# Patient Record
Sex: MALE | Race: WHITE
[De-identification: names, ages, dates, MRNs, and addresses within clinical notes are randomized per-mention and may not be internally consistent; named-entity substitution may affect disease eponyms.]

---

## 2018-02-26 ENCOUNTER — HOSPITAL ENCOUNTER (OUTPATIENT)
Age: 78
End: 2018-02-26
Payer: MEDICARE

## 2018-02-26 DIAGNOSIS — M10.9: ICD-10-CM

## 2018-02-26 DIAGNOSIS — N18.3: Primary | ICD-10-CM

## 2018-02-26 DIAGNOSIS — D63.1: ICD-10-CM

## 2018-02-26 DIAGNOSIS — N25.81: ICD-10-CM

## 2018-02-26 DIAGNOSIS — E83.42: ICD-10-CM

## 2018-02-26 LAB
ALBUMIN SERPL-MCNC: 3.4 G/DL (ref 3.2–5)
BUN SERPL-MCNC: 19 MG/DL (ref 7–18)
BUN/CREAT RATIO: 16.2 RATIO (ref 10–20)
CALCIUM SERPL-MCNC: 9.6 MG/DL (ref 8.5–10.1)
CARBON DIOXIDE: 29 MMOL/L (ref 21–32)
CHLORIDE: 109 MMOL/L (ref 98–107)
CREAT UR-MCNC: 103 MG/DL
DEPRECATED RDW RBC: 60.4 FL (ref 35.1–43.9)
DIFFERENTIAL INDICATED: (no result)
ERYTHROCYTE [DISTWIDTH] IN BLOOD: 18.2 % (ref 11.6–14.6)
EST GLOM FILT RATE - AFR AMER: 78 ML/MIN (ref 60–?)
GLUCOSE: 131 MG/DL (ref 74–106)
HCT VFR BLD AUTO: 35.6 % (ref 40–54)
HEMOGLOBIN: 11 G/DL (ref 13–16.5)
HGB BLD-MCNC: 11 G/DL (ref 13–16.5)
IMMATURE GRANULOCYTES COUNT: 0.01 X10^3/UL (ref 0–0)
MAGNESIUM: 1.6 MG/DL (ref 1.6–2.6)
MCV RBC: 93.7 FL (ref 80–94)
MEAN CORP HGB CONC: 30.9 G/GL (ref 32–36)
MEAN PLATELET VOL.: 11.7 FL (ref 6.2–12)
PLATELET # BLD: 111 K/MM3 (ref 150–450)
PLATELET COUNT: 111 K/MM3 (ref 150–450)
POSITIVE COUNT: NO
POSITIVE DIFFERENTIAL: NO
POSITIVE MORPHOLOGY: NO
POTASSIUM: 4.2 MMOL/L (ref 3.5–5.1)
PROT UR-MCNC: 15.5 MG/DL (ref ?–11.9)
PROT/CREAT UR: 150 MG/G CRE (ref 0–200)
RBC # BLD AUTO: 3.8 M/MM3 (ref 4.6–6.2)
RBC DISTRIBUTION WIDTH CV: 18.2 % (ref 11.6–14.6)
RBC DISTRIBUTION WIDTH SD: 60.4 FL (ref 35.1–43.9)
URATE SERPL-MCNC: 5.4 MG/DL (ref 3.5–7.2)
VITAMIN D,25 HYDROXY: 32.4 NG/ML (ref 19.95–100.01)
WBC # BLD AUTO: 5.2 K/MM3 (ref 4.4–11)
WHITE BLOOD COUNT: 5.2 K/MM3 (ref 4.4–11)

## 2018-02-26 PROCEDURE — 84156 ASSAY OF PROTEIN URINE: CPT

## 2018-02-26 PROCEDURE — 84550 ASSAY OF BLOOD/URIC ACID: CPT

## 2018-02-26 PROCEDURE — 82306 VITAMIN D 25 HYDROXY: CPT

## 2018-02-26 PROCEDURE — 80069 RENAL FUNCTION PANEL: CPT

## 2018-02-26 PROCEDURE — 36415 COLL VENOUS BLD VENIPUNCTURE: CPT

## 2018-02-26 PROCEDURE — 83735 ASSAY OF MAGNESIUM: CPT

## 2018-02-26 PROCEDURE — 83970 ASSAY OF PARATHORMONE: CPT

## 2018-02-26 PROCEDURE — 85025 COMPLETE CBC W/AUTO DIFF WBC: CPT

## 2018-02-26 PROCEDURE — 82570 ASSAY OF URINE CREATININE: CPT

## 2018-02-28 LAB — PTHIN: 68.9 PG/ML (ref 18.4–80.1)

## 2018-11-05 ENCOUNTER — HOSPITAL ENCOUNTER (EMERGENCY)
Age: 78
LOS: 1 days | Discharge: HOME | End: 2018-11-06
Payer: MEDICARE

## 2018-11-05 VITALS — BODY MASS INDEX: 40.4 KG/M2

## 2018-11-05 VITALS — RESPIRATION RATE: 16 BRPM | HEART RATE: 98 BPM | TEMPERATURE: 99.68 F | OXYGEN SATURATION: 95 %

## 2018-11-05 VITALS — HEART RATE: 99 BPM | SYSTOLIC BLOOD PRESSURE: 160 MMHG | RESPIRATION RATE: 13 BRPM | DIASTOLIC BLOOD PRESSURE: 92 MMHG

## 2018-11-05 VITALS
HEART RATE: 90 BPM | SYSTOLIC BLOOD PRESSURE: 125 MMHG | DIASTOLIC BLOOD PRESSURE: 77 MMHG | RESPIRATION RATE: 20 BRPM | OXYGEN SATURATION: 97 %

## 2018-11-05 DIAGNOSIS — Z79.02: ICD-10-CM

## 2018-11-05 DIAGNOSIS — Z79.51: ICD-10-CM

## 2018-11-05 DIAGNOSIS — J45.909: ICD-10-CM

## 2018-11-05 DIAGNOSIS — Q61.3: ICD-10-CM

## 2018-11-05 DIAGNOSIS — R11.0: Primary | ICD-10-CM

## 2018-11-05 DIAGNOSIS — I12.9: ICD-10-CM

## 2018-11-05 DIAGNOSIS — N18.9: ICD-10-CM

## 2018-11-05 DIAGNOSIS — Z95.2: ICD-10-CM

## 2018-11-05 DIAGNOSIS — Z79.899: ICD-10-CM

## 2018-11-05 DIAGNOSIS — M10.9: ICD-10-CM

## 2018-11-05 LAB
ANION GAP: 7 (ref 5–15)
BUN SERPL-MCNC: 22 MG/DL (ref 7–18)
BUN/CREAT RATIO: 15.7 RATIO (ref 10–20)
CALCIUM SERPL-MCNC: 10.4 MG/DL (ref 8.5–10.1)
CARBON DIOXIDE: 25 MMOL/L (ref 21–32)
CHLORIDE: 109 MMOL/L (ref 98–107)
DEPRECATED RDW RBC: 58.8 FL (ref 35.1–43.9)
DIFFERENTIAL INDICATED: (no result)
ERYTHROCYTE [DISTWIDTH] IN BLOOD: 16.2 % (ref 11.6–14.6)
EST GLOM FILT RATE - AFR AMER: 63 ML/MIN (ref 60–?)
ESTIMATED CREATININE CLEARANCE: 44.9 ML/MIN
GLUCOSE: 142 MG/DL (ref 74–106)
HCT VFR BLD AUTO: 43.8 % (ref 40–54)
HEMOGLOBIN: 13.7 G/DL (ref 13–16.5)
HGB BLD-MCNC: 13.7 G/DL (ref 13–16.5)
IMMATURE GRANULOCYTES COUNT: 0.01 X10^3/UL (ref 0–0)
MCV RBC: 99.3 FL (ref 80–94)
MEAN CORP HGB CONC: 31.3 G/GL (ref 32–36)
MEAN PLATELET VOL.: 13 FL (ref 6.2–12)
PLATELET # BLD: 89 K/MM3 (ref 150–450)
PLATELET COUNT: 89 K/MM3 (ref 150–450)
POSITIVE COUNT: NO
POSITIVE DIFFERENTIAL: YES
POSITIVE MORPHOLOGY: NO
POTASSIUM: 4.3 MMOL/L (ref 3.5–5.1)
RBC # BLD AUTO: 4.41 M/MM3 (ref 4.6–6.2)
RBC DISTRIBUTION WIDTH CV: 16.2 % (ref 11.6–14.6)
RBC DISTRIBUTION WIDTH SD: 58.8 FL (ref 35.1–43.9)
SCAN SMEAR PER REVIEW CRITERIA: (no result)
WBC # BLD AUTO: 7.4 K/MM3 (ref 4.4–11)
WHITE BLOOD COUNT: 7.4 K/MM3 (ref 4.4–11)

## 2018-11-05 PROCEDURE — 80048 BASIC METABOLIC PNL TOTAL CA: CPT

## 2018-11-05 PROCEDURE — A4216 STERILE WATER/SALINE, 10 ML: HCPCS

## 2018-11-05 PROCEDURE — 85025 COMPLETE CBC W/AUTO DIFF WBC: CPT

## 2018-11-05 PROCEDURE — 84484 ASSAY OF TROPONIN QUANT: CPT

## 2018-11-05 PROCEDURE — 93005 ELECTROCARDIOGRAM TRACING: CPT

## 2018-11-05 PROCEDURE — 96361 HYDRATE IV INFUSION ADD-ON: CPT

## 2018-11-05 PROCEDURE — 71045 X-RAY EXAM CHEST 1 VIEW: CPT

## 2018-11-05 PROCEDURE — 96374 THER/PROPH/DIAG INJ IV PUSH: CPT

## 2018-11-05 PROCEDURE — 99285 EMERGENCY DEPT VISIT HI MDM: CPT

## 2019-02-22 ENCOUNTER — HOSPITAL ENCOUNTER (OUTPATIENT)
Age: 79
End: 2019-02-22
Payer: MEDICARE

## 2019-02-22 VITALS — BODY MASS INDEX: 40.4 KG/M2

## 2019-02-22 DIAGNOSIS — N18.3: Primary | ICD-10-CM

## 2019-02-22 DIAGNOSIS — E55.9: ICD-10-CM

## 2019-02-22 DIAGNOSIS — D63.1: ICD-10-CM

## 2019-02-22 LAB
ALBUMIN SERPL-MCNC: 3.7 G/DL (ref 3.2–5)
BUN SERPL-MCNC: 25 MG/DL (ref 7–18)
BUN/CREAT RATIO: 19.7 RATIO (ref 10–20)
CALCIUM SERPL-MCNC: 10.3 MG/DL (ref 8.5–10.1)
CARBON DIOXIDE: 28 MMOL/L (ref 21–32)
CHLORIDE: 108 MMOL/L (ref 98–107)
CREAT UR-MCNC: 84.8 MG/DL
DEPRECATED RDW RBC: 58.6 FL (ref 35.1–43.9)
ERYTHROCYTE [DISTWIDTH] IN BLOOD: 16.1 % (ref 11.6–14.6)
EST GLOM FILT RATE - AFR AMER: 70 ML/MIN (ref 60–?)
GLUCOSE: 108 MG/DL (ref 74–106)
HCT VFR BLD AUTO: 41.8 % (ref 40–54)
HEMOGLOBIN: 13 G/DL (ref 13–16.5)
HGB BLD-MCNC: 13 G/DL (ref 13–16.5)
MCV RBC: 99.5 FL (ref 80–94)
MEAN CORP HGB CONC: 31.1 G/GL (ref 32–36)
MEAN PLATELET VOL.: 12.8 FL (ref 6.2–12)
PLATELET # BLD: 94 K/MM3 (ref 150–450)
PLATELET COUNT: 94 K/MM3 (ref 150–450)
POTASSIUM: 4.5 MMOL/L (ref 3.5–5.1)
PROT UR-MCNC: 17.7 MG/DL (ref ?–11.9)
PROT/CREAT UR: 209 MG/G CRE (ref 0–200)
PTHIN: 95.8 PG/ML (ref 18.4–80.1)
RBC # BLD AUTO: 4.2 M/MM3 (ref 4.6–6.2)
RBC DISTRIBUTION WIDTH CV: 16.1 % (ref 11.6–14.6)
RBC DISTRIBUTION WIDTH SD: 58.6 FL (ref 35.1–43.9)
SCAN INDICATED ON CBC? Y/N: NO
VITAMIN D,25 HYDROXY: 28.9 NG/ML (ref 29.95–100.01)
WBC # BLD AUTO: 5.9 K/MM3 (ref 4.4–11)
WHITE BLOOD COUNT: 5.9 K/MM3 (ref 4.4–11)

## 2019-02-22 PROCEDURE — 84156 ASSAY OF PROTEIN URINE: CPT

## 2019-02-22 PROCEDURE — 80069 RENAL FUNCTION PANEL: CPT

## 2019-02-22 PROCEDURE — 36415 COLL VENOUS BLD VENIPUNCTURE: CPT

## 2019-02-22 PROCEDURE — 83970 ASSAY OF PARATHORMONE: CPT

## 2019-02-22 PROCEDURE — 82306 VITAMIN D 25 HYDROXY: CPT

## 2019-02-22 PROCEDURE — 82570 ASSAY OF URINE CREATININE: CPT

## 2019-02-22 PROCEDURE — 85027 COMPLETE CBC AUTOMATED: CPT

## 2019-09-03 ENCOUNTER — HOSPITAL ENCOUNTER (OUTPATIENT)
Dept: HOSPITAL 100 - LAB.FUTURE | Age: 79
Discharge: HOME | End: 2019-09-03
Payer: MEDICARE

## 2019-09-03 DIAGNOSIS — E55.9: Primary | ICD-10-CM

## 2019-09-03 DIAGNOSIS — N18.3: ICD-10-CM

## 2019-09-03 DIAGNOSIS — D63.1: ICD-10-CM

## 2019-09-03 DIAGNOSIS — N25.81: ICD-10-CM

## 2019-09-03 LAB
ALBUMIN SERPL-MCNC: 3.3 G/DL (ref 3.2–5)
BUN SERPL-MCNC: 20 MG/DL (ref 7–18)
BUN/CREAT RATIO: 14.3 RATIO (ref 10–20)
CALCIUM SERPL-MCNC: 9.8 MG/DL (ref 8.5–10.1)
CARBON DIOXIDE: 30 MMOL/L (ref 21–32)
CHLORIDE: 112 MMOL/L (ref 98–107)
CREAT UR-MCNC: 102 MG/DL
DEPRECATED RDW RBC: 61.6 FL (ref 35.1–43.9)
ERYTHROCYTE [DISTWIDTH] IN BLOOD: 17.2 % (ref 11.6–14.6)
EST GLOM FILT RATE - AFR AMER: 63 ML/MIN (ref 60–?)
GLUCOSE: 106 MG/DL (ref 74–106)
HCT VFR BLD AUTO: 37.6 % (ref 40–54)
HEMOGLOBIN: 11.6 G/DL (ref 13–16.5)
HGB BLD-MCNC: 11.6 G/DL (ref 13–16.5)
MCV RBC: 97.9 FL (ref 80–94)
MEAN CORP HGB CONC: 30.9 G/DL (ref 32–36)
MEAN PLATELET VOL.: 12.6 FL (ref 6.2–12)
PLATELET # BLD: 85 K/MM3 (ref 150–450)
PLATELET COUNT: 85 K/MM3 (ref 150–450)
POTASSIUM: 4.4 MMOL/L (ref 3.5–5.1)
PROT UR-MCNC: 11.1 MG/DL (ref ?–11.9)
PROT/CREAT UR: 109 MG/G CRE (ref 0–200)
PTHIN: 94.9 PG/ML (ref 18.4–80.1)
RBC # BLD AUTO: 3.84 M/MM3 (ref 4.6–6.2)
RBC DISTRIBUTION WIDTH CV: 17.2 % (ref 11.6–14.6)
RBC DISTRIBUTION WIDTH SD: 61.6 FL (ref 35.1–43.9)
VITAMIN D,25 HYDROXY: 29.5 NG/ML (ref 29.95–100.01)
WBC # BLD AUTO: 4.8 K/MM3 (ref 4.4–11)
WHITE BLOOD COUNT: 4.8 K/MM3 (ref 4.4–11)

## 2019-09-03 PROCEDURE — 83970 ASSAY OF PARATHORMONE: CPT

## 2019-09-03 PROCEDURE — 82306 VITAMIN D 25 HYDROXY: CPT

## 2019-09-03 PROCEDURE — 84156 ASSAY OF PROTEIN URINE: CPT

## 2019-09-03 PROCEDURE — 80069 RENAL FUNCTION PANEL: CPT

## 2019-09-03 PROCEDURE — 85027 COMPLETE CBC AUTOMATED: CPT

## 2019-09-03 PROCEDURE — 36415 COLL VENOUS BLD VENIPUNCTURE: CPT

## 2019-09-03 PROCEDURE — 82570 ASSAY OF URINE CREATININE: CPT

## 2019-12-23 ENCOUNTER — HOSPITAL ENCOUNTER (OUTPATIENT)
Dept: HOSPITAL 100 - ED | Age: 79
Setting detail: OBSERVATION
LOS: 1 days | Discharge: HOME | End: 2019-12-24
Payer: MEDICARE

## 2019-12-23 VITALS
DIASTOLIC BLOOD PRESSURE: 51 MMHG | OXYGEN SATURATION: 96 % | TEMPERATURE: 97.7 F | SYSTOLIC BLOOD PRESSURE: 94 MMHG | HEART RATE: 89 BPM | RESPIRATION RATE: 18 BRPM

## 2019-12-23 VITALS
SYSTOLIC BLOOD PRESSURE: 110 MMHG | RESPIRATION RATE: 16 BRPM | HEART RATE: 95 BPM | OXYGEN SATURATION: 94 % | DIASTOLIC BLOOD PRESSURE: 73 MMHG

## 2019-12-23 VITALS
DIASTOLIC BLOOD PRESSURE: 79 MMHG | OXYGEN SATURATION: 94 % | SYSTOLIC BLOOD PRESSURE: 100 MMHG | HEART RATE: 90 BPM | RESPIRATION RATE: 16 BRPM

## 2019-12-23 VITALS
DIASTOLIC BLOOD PRESSURE: 73 MMHG | SYSTOLIC BLOOD PRESSURE: 135 MMHG | HEART RATE: 95 BPM | RESPIRATION RATE: 18 BRPM | TEMPERATURE: 99.5 F | OXYGEN SATURATION: 94 %

## 2019-12-23 VITALS
BODY MASS INDEX: 39.4 KG/M2 | BODY MASS INDEX: 39.5 KG/M2 | TEMPERATURE: 99.14 F | RESPIRATION RATE: 18 BRPM | OXYGEN SATURATION: 96 % | HEART RATE: 86 BPM | BODY MASS INDEX: 42.3 KG/M2 | SYSTOLIC BLOOD PRESSURE: 148 MMHG | DIASTOLIC BLOOD PRESSURE: 87 MMHG | BODY MASS INDEX: 41.8 KG/M2

## 2019-12-23 VITALS
DIASTOLIC BLOOD PRESSURE: 79 MMHG | TEMPERATURE: 98.06 F | RESPIRATION RATE: 16 BRPM | OXYGEN SATURATION: 93 % | HEART RATE: 93 BPM | SYSTOLIC BLOOD PRESSURE: 148 MMHG

## 2019-12-23 VITALS
DIASTOLIC BLOOD PRESSURE: 79 MMHG | SYSTOLIC BLOOD PRESSURE: 148 MMHG | HEART RATE: 98 BPM | RESPIRATION RATE: 16 BRPM | OXYGEN SATURATION: 94 %

## 2019-12-23 VITALS
SYSTOLIC BLOOD PRESSURE: 101 MMHG | OXYGEN SATURATION: 94 % | DIASTOLIC BLOOD PRESSURE: 65 MMHG | HEART RATE: 94 BPM | RESPIRATION RATE: 16 BRPM

## 2019-12-23 VITALS
SYSTOLIC BLOOD PRESSURE: 112 MMHG | HEART RATE: 103 BPM | RESPIRATION RATE: 18 BRPM | DIASTOLIC BLOOD PRESSURE: 72 MMHG | TEMPERATURE: 99.86 F | OXYGEN SATURATION: 96 %

## 2019-12-23 VITALS
HEART RATE: 90 BPM | SYSTOLIC BLOOD PRESSURE: 99 MMHG | DIASTOLIC BLOOD PRESSURE: 66 MMHG | OXYGEN SATURATION: 93 % | RESPIRATION RATE: 16 BRPM

## 2019-12-23 VITALS
DIASTOLIC BLOOD PRESSURE: 74 MMHG | OXYGEN SATURATION: 98 % | SYSTOLIC BLOOD PRESSURE: 116 MMHG | HEART RATE: 82 BPM | RESPIRATION RATE: 18 BRPM

## 2019-12-23 VITALS
OXYGEN SATURATION: 98 % | HEART RATE: 95 BPM | SYSTOLIC BLOOD PRESSURE: 148 MMHG | DIASTOLIC BLOOD PRESSURE: 79 MMHG | RESPIRATION RATE: 16 BRPM | TEMPERATURE: 98.06 F

## 2019-12-23 VITALS
TEMPERATURE: 97.2 F | HEART RATE: 100 BPM | SYSTOLIC BLOOD PRESSURE: 88 MMHG | RESPIRATION RATE: 16 BRPM | DIASTOLIC BLOOD PRESSURE: 64 MMHG | OXYGEN SATURATION: 94 %

## 2019-12-23 VITALS — RESPIRATION RATE: 18 BRPM | HEART RATE: 109 BPM | OXYGEN SATURATION: 92 %

## 2019-12-23 DIAGNOSIS — K29.51: Primary | ICD-10-CM

## 2019-12-23 DIAGNOSIS — E66.9: ICD-10-CM

## 2019-12-23 DIAGNOSIS — I25.10: ICD-10-CM

## 2019-12-23 DIAGNOSIS — M19.90: ICD-10-CM

## 2019-12-23 DIAGNOSIS — Z79.899: ICD-10-CM

## 2019-12-23 DIAGNOSIS — Z79.51: ICD-10-CM

## 2019-12-23 DIAGNOSIS — E83.52: ICD-10-CM

## 2019-12-23 DIAGNOSIS — E78.5: ICD-10-CM

## 2019-12-23 DIAGNOSIS — D63.1: ICD-10-CM

## 2019-12-23 DIAGNOSIS — R91.8: ICD-10-CM

## 2019-12-23 DIAGNOSIS — D69.6: ICD-10-CM

## 2019-12-23 DIAGNOSIS — M10.9: ICD-10-CM

## 2019-12-23 DIAGNOSIS — G47.33: ICD-10-CM

## 2019-12-23 DIAGNOSIS — K21.9: ICD-10-CM

## 2019-12-23 DIAGNOSIS — Q61.3: ICD-10-CM

## 2019-12-23 DIAGNOSIS — J45.909: ICD-10-CM

## 2019-12-23 DIAGNOSIS — Z79.82: ICD-10-CM

## 2019-12-23 DIAGNOSIS — Z71.3: ICD-10-CM

## 2019-12-23 DIAGNOSIS — N18.3: ICD-10-CM

## 2019-12-23 DIAGNOSIS — K29.81: ICD-10-CM

## 2019-12-23 DIAGNOSIS — K25.9: ICD-10-CM

## 2019-12-23 DIAGNOSIS — R32: ICD-10-CM

## 2019-12-23 DIAGNOSIS — N25.81: ICD-10-CM

## 2019-12-23 DIAGNOSIS — K44.9: ICD-10-CM

## 2019-12-23 DIAGNOSIS — I12.9: ICD-10-CM

## 2019-12-23 DIAGNOSIS — K64.4: ICD-10-CM

## 2019-12-23 DIAGNOSIS — Z95.2: ICD-10-CM

## 2019-12-23 LAB
ANION GAP: 5 (ref 5–15)
BUN SERPL-MCNC: 21 MG/DL (ref 7–18)
BUN/CREAT RATIO: 15.9 RATIO (ref 10–20)
CALCIUM SERPL-MCNC: 10.3 MG/DL (ref 8.5–10.1)
CARBON DIOXIDE: 27 MMOL/L (ref 21–32)
CHLORIDE: 112 MMOL/L (ref 98–107)
DEPRECATED RDW RBC: 60.9 FL (ref 35.1–43.9)
DIFFERENTIAL COMMENT: (no result)
DIFFERENTIAL INDICATED: (no result)
ERYTHROCYTE [DISTWIDTH] IN BLOOD: 17.3 % (ref 11.6–14.6)
EST GLOM FILT RATE - AFR AMER: 67 ML/MIN (ref 60–?)
ESTIMATED CREATININE CLEARANCE: 46.85 ML/MIN
GLUCOSE: 163 MG/DL (ref 74–106)
HCT VFR BLD AUTO: 35.2 % (ref 40–54)
HCT VFR BLD AUTO: 38.4 % (ref 40–54)
HCT VFR BLD AUTO: 41.9 % (ref 40–54)
HCT VFR BLD AUTO: 42.5 % (ref 40–54)
HEMOGLOBIN: 10.9 G/DL (ref 13–16.5)
HEMOGLOBIN: 11.7 G/DL (ref 13–16.5)
HEMOGLOBIN: 12.8 G/DL (ref 13–16.5)
HEMOGLOBIN: 13.3 G/DL (ref 13–16.5)
HGB BLD-MCNC: 10.9 G/DL (ref 13–16.5)
HGB BLD-MCNC: 11.7 G/DL (ref 13–16.5)
HGB BLD-MCNC: 12.8 G/DL (ref 13–16.5)
HGB BLD-MCNC: 13.3 G/DL (ref 13–16.5)
IMMATURE GRANULOCYTES COUNT: 0.04 X10^3/UL (ref 0–0)
MANUAL DIF COMMENT BLD-IMP: (no result)
MCV RBC: 96.2 FL (ref 80–94)
MEAN CORP HGB CONC: 31.3 G/DL (ref 32–36)
MEAN PLATELET VOL.: 12.8 FL (ref 6.2–12)
NRBC FLAGGED BY ANALYZER: 0 % (ref 0–5)
PLATELET # BLD: 98 K/MM3 (ref 150–450)
PLATELET COUNT: 98 K/MM3 (ref 150–450)
POSITIVE COUNT: YES
POSITIVE DIFFERENTIAL: YES
POTASSIUM: 4.4 MMOL/L (ref 3.5–5.1)
PROTHROMBIN TIME (PROTIME)PT.: 15.1 SECONDS (ref 11.7–14.9)
RBC # BLD AUTO: 4.42 M/MM3 (ref 4.6–6.2)
RBC DISTRIBUTION WIDTH CV: 17.3 % (ref 11.6–14.6)
RBC DISTRIBUTION WIDTH SD: 60.9 FL (ref 35.1–43.9)
SCAN SMEAR PER REVIEW CRITERIA: (no result)
WBC # BLD AUTO: 9.4 K/MM3 (ref 4.4–11)
WHITE BLOOD COUNT: 9.4 K/MM3 (ref 4.4–11)

## 2019-12-23 PROCEDURE — 96375 TX/PRO/DX INJ NEW DRUG ADDON: CPT

## 2019-12-23 PROCEDURE — 88342 IMHCHEM/IMCYTCHM 1ST ANTB: CPT

## 2019-12-23 PROCEDURE — 85014 HEMATOCRIT: CPT

## 2019-12-23 PROCEDURE — 86900 BLOOD TYPING SEROLOGIC ABO: CPT

## 2019-12-23 PROCEDURE — 86901 BLOOD TYPING SEROLOGIC RH(D): CPT

## 2019-12-23 PROCEDURE — 0DJ08ZZ INSPECTION OF UPPER INTESTINAL TRACT, VIA NATURAL OR ARTIFICIAL OPENING ENDOSCOPIC: ICD-10-PCS | Performed by: SURGERY

## 2019-12-23 PROCEDURE — 99251: CPT

## 2019-12-23 PROCEDURE — 96366 THER/PROPH/DIAG IV INF ADDON: CPT

## 2019-12-23 PROCEDURE — 99284 EMERGENCY DEPT VISIT MOD MDM: CPT

## 2019-12-23 PROCEDURE — G0463 HOSPITAL OUTPT CLINIC VISIT: HCPCS

## 2019-12-23 PROCEDURE — 85025 COMPLETE CBC W/AUTO DIFF WBC: CPT

## 2019-12-23 PROCEDURE — G0378 HOSPITAL OBSERVATION PER HR: HCPCS

## 2019-12-23 PROCEDURE — 82330 ASSAY OF CALCIUM: CPT

## 2019-12-23 PROCEDURE — A4216 STERILE WATER/SALINE, 10 ML: HCPCS

## 2019-12-23 PROCEDURE — 99218: CPT

## 2019-12-23 PROCEDURE — 80048 BASIC METABOLIC PNL TOTAL CA: CPT

## 2019-12-23 PROCEDURE — 36415 COLL VENOUS BLD VENIPUNCTURE: CPT

## 2019-12-23 PROCEDURE — 88305 TISSUE EXAM BY PATHOLOGIST: CPT

## 2019-12-23 PROCEDURE — 94640 AIRWAY INHALATION TREATMENT: CPT

## 2019-12-23 PROCEDURE — 85610 PROTHROMBIN TIME: CPT

## 2019-12-23 PROCEDURE — 86850 RBC ANTIBODY SCREEN: CPT

## 2019-12-23 PROCEDURE — 96365 THER/PROPH/DIAG IV INF INIT: CPT

## 2019-12-23 PROCEDURE — 85018 HEMOGLOBIN: CPT

## 2019-12-23 PROCEDURE — 43239 EGD BIOPSY SINGLE/MULTIPLE: CPT

## 2019-12-23 RX ADMIN — ALBUTEROL SULFATE 2.5 MG: 2.5 SOLUTION RESPIRATORY (INHALATION) at 19:25

## 2019-12-23 RX ADMIN — BUDESONIDE 0.5 MG: 0.5 SUSPENSION RESPIRATORY (INHALATION) at 19:25

## 2019-12-24 VITALS — HEART RATE: 81 BPM

## 2019-12-24 VITALS
DIASTOLIC BLOOD PRESSURE: 71 MMHG | OXYGEN SATURATION: 93 % | TEMPERATURE: 97.5 F | SYSTOLIC BLOOD PRESSURE: 115 MMHG | HEART RATE: 84 BPM | RESPIRATION RATE: 18 BRPM

## 2019-12-24 VITALS — HEART RATE: 95 BPM

## 2019-12-24 VITALS — HEART RATE: 98 BPM

## 2019-12-24 VITALS — HEART RATE: 75 BPM

## 2019-12-24 VITALS
TEMPERATURE: 97.88 F | HEART RATE: 89 BPM | RESPIRATION RATE: 18 BRPM | OXYGEN SATURATION: 99 % | SYSTOLIC BLOOD PRESSURE: 94 MMHG | DIASTOLIC BLOOD PRESSURE: 67 MMHG

## 2019-12-24 VITALS — SYSTOLIC BLOOD PRESSURE: 123 MMHG | DIASTOLIC BLOOD PRESSURE: 70 MMHG

## 2019-12-24 VITALS — RESPIRATION RATE: 20 BRPM | OXYGEN SATURATION: 93 % | HEART RATE: 105 BPM

## 2019-12-24 LAB
ANION GAP: 6 (ref 5–15)
BUN SERPL-MCNC: 28 MG/DL (ref 7–18)
BUN/CREAT RATIO: 19.4 RATIO (ref 10–20)
CALCIUM SERPL-MCNC: 9.1 MG/DL (ref 8.5–10.1)
CARBON DIOXIDE: 23 MMOL/L (ref 21–32)
CHLORIDE: 113 MMOL/L (ref 98–107)
DEPRECATED RDW RBC: 61.6 FL (ref 35.1–43.9)
DIFFERENTIAL COMMENT: (no result)
DIFFERENTIAL INDICATED: (no result)
ERYTHROCYTE [DISTWIDTH] IN BLOOD: 17.9 % (ref 11.6–14.6)
EST GLOM FILT RATE - AFR AMER: 61 ML/MIN (ref 60–?)
ESTIMATED CREATININE CLEARANCE: 42.95 ML/MIN
GLUCOSE: 121 MG/DL (ref 74–106)
HCT VFR BLD AUTO: 33.5 % (ref 40–54)
HEMOGLOBIN: 10.6 G/DL (ref 13–16.5)
HGB BLD-MCNC: 10.6 G/DL (ref 13–16.5)
IMMATURE GRANULOCYTES COUNT: 0.01 X10^3/UL (ref 0–0)
MANUAL DIF COMMENT BLD-IMP: (no result)
MCV RBC: 96.8 FL (ref 80–94)
MEAN CORP HGB CONC: 31.6 G/DL (ref 32–36)
MEAN PLATELET VOL.: 12.7 FL (ref 6.2–12)
NRBC FLAGGED BY ANALYZER: 0 % (ref 0–5)
PLATELET # BLD: 70 K/MM3 (ref 150–450)
PLATELET COUNT: 70 K/MM3 (ref 150–450)
POSITIVE COUNT: YES
POSITIVE DIFFERENTIAL: YES
POTASSIUM: 4.3 MMOL/L (ref 3.5–5.1)
RBC # BLD AUTO: 3.46 M/MM3 (ref 4.6–6.2)
RBC DISTRIBUTION WIDTH CV: 17.9 % (ref 11.6–14.6)
RBC DISTRIBUTION WIDTH SD: 61.6 FL (ref 35.1–43.9)
SCAN SMEAR PER REVIEW CRITERIA: (no result)
WBC # BLD AUTO: 4.4 K/MM3 (ref 4.4–11)
WHITE BLOOD COUNT: 4.4 K/MM3 (ref 4.4–11)

## 2019-12-24 RX ADMIN — SODIUM CHLORIDE, PRESERVATIVE FREE 0 ML: 5 INJECTION INTRAVENOUS at 09:21

## 2019-12-24 RX ADMIN — BUDESONIDE 0.5 MG: 0.5 SUSPENSION RESPIRATORY (INHALATION) at 06:48

## 2019-12-24 RX ADMIN — SODIUM CHLORIDE, PRESERVATIVE FREE 0 ML: 5 INJECTION INTRAVENOUS at 00:21

## 2019-12-24 RX ADMIN — FLUTICASONE PROPIONATE 1 SPRAY: 50 SPRAY, METERED NASAL at 09:25

## 2019-12-24 RX ADMIN — ALBUTEROL SULFATE 2.5 MG: 2.5 SOLUTION RESPIRATORY (INHALATION) at 06:48

## 2020-02-28 ENCOUNTER — HOSPITAL ENCOUNTER (OUTPATIENT)
Dept: HOSPITAL 100 - LAB.FUTURE | Age: 80
End: 2020-02-28
Payer: MEDICARE

## 2020-02-28 VITALS — BODY MASS INDEX: 39.4 KG/M2

## 2020-02-28 DIAGNOSIS — D63.1: ICD-10-CM

## 2020-02-28 DIAGNOSIS — N25.81: ICD-10-CM

## 2020-02-28 DIAGNOSIS — E55.9: Primary | ICD-10-CM

## 2020-02-28 DIAGNOSIS — N18.3: ICD-10-CM

## 2020-02-28 LAB
ALBUMIN SERPL-MCNC: 3.3 G/DL (ref 3.2–5)
BUN SERPL-MCNC: 21 MG/DL (ref 7–18)
BUN/CREAT RATIO: 15.4 RATIO (ref 10–20)
CALCIUM SERPL-MCNC: 10.3 MG/DL (ref 8.5–10.1)
CARBON DIOXIDE: 30 MMOL/L (ref 21–32)
CHLORIDE: 111 MMOL/L (ref 98–107)
CREAT UR-MCNC: 113 MG/DL
DEPRECATED RDW RBC: 59 FL (ref 35.1–43.9)
ERYTHROCYTE [DISTWIDTH] IN BLOOD: 17.1 % (ref 11.6–14.6)
EST GLOM FILT RATE - AFR AMER: 65 ML/MIN (ref 60–?)
GLUCOSE: 128 MG/DL (ref 74–106)
HCT VFR BLD AUTO: 35.7 % (ref 40–54)
HEMOGLOBIN: 11 G/DL (ref 13–16.5)
HGB BLD-MCNC: 11 G/DL (ref 13–16.5)
MCV RBC: 94.2 FL (ref 80–94)
MEAN CORP HGB CONC: 30.8 G/DL (ref 32–36)
MEAN PLATELET VOL.: 12.3 FL (ref 6.2–12)
PLATELET # BLD: 90 K/MM3 (ref 150–450)
PLATELET COUNT: 90 K/MM3 (ref 150–450)
POSITIVE COUNT: YES
POTASSIUM: 4.5 MMOL/L (ref 3.5–5.1)
PROT UR-MCNC: 9.2 MG/DL (ref ?–11.9)
PROT/CREAT UR: 81 MG/G CRE (ref 0–200)
PTHIN: 88.5 PG/ML (ref 18.4–80.1)
RBC # BLD AUTO: 3.79 M/MM3 (ref 4.6–6.2)
RBC DISTRIBUTION WIDTH CV: 17.1 % (ref 11.6–14.6)
RBC DISTRIBUTION WIDTH SD: 59 FL (ref 35.1–43.9)
SCAN INDICATED ON CBC? Y/N: NO
VITAMIN D,25 HYDROXY: 29.3 NG/ML
WBC # BLD AUTO: 5.1 K/MM3 (ref 4.4–11)
WHITE BLOOD COUNT: 5.1 K/MM3 (ref 4.4–11)

## 2020-02-28 PROCEDURE — 85027 COMPLETE CBC AUTOMATED: CPT

## 2020-02-28 PROCEDURE — 82306 VITAMIN D 25 HYDROXY: CPT

## 2020-02-28 PROCEDURE — 83970 ASSAY OF PARATHORMONE: CPT

## 2020-02-28 PROCEDURE — 80069 RENAL FUNCTION PANEL: CPT

## 2020-02-28 PROCEDURE — 84156 ASSAY OF PROTEIN URINE: CPT

## 2020-02-28 PROCEDURE — 82570 ASSAY OF URINE CREATININE: CPT

## 2020-02-28 PROCEDURE — 36415 COLL VENOUS BLD VENIPUNCTURE: CPT

## 2020-05-18 ENCOUNTER — HOSPITAL ENCOUNTER (INPATIENT)
Dept: HOSPITAL 100 - ED | Age: 80
LOS: 3 days | Discharge: NURSING, REHAB OR LONG-TERM CARE | DRG: 481 | End: 2020-05-21
Payer: MEDICARE

## 2020-05-18 VITALS
HEART RATE: 75 BPM | SYSTOLIC BLOOD PRESSURE: 155 MMHG | TEMPERATURE: 98.9 F | RESPIRATION RATE: 16 BRPM | OXYGEN SATURATION: 96 % | DIASTOLIC BLOOD PRESSURE: 88 MMHG

## 2020-05-18 VITALS
TEMPERATURE: 98.1 F | OXYGEN SATURATION: 97 % | DIASTOLIC BLOOD PRESSURE: 78 MMHG | HEART RATE: 71 BPM | RESPIRATION RATE: 18 BRPM | SYSTOLIC BLOOD PRESSURE: 126 MMHG

## 2020-05-18 VITALS
HEART RATE: 76 BPM | DIASTOLIC BLOOD PRESSURE: 76 MMHG | SYSTOLIC BLOOD PRESSURE: 126 MMHG | RESPIRATION RATE: 18 BRPM | OXYGEN SATURATION: 98 %

## 2020-05-18 VITALS — BODY MASS INDEX: 40.7 KG/M2 | BODY MASS INDEX: 39.4 KG/M2 | BODY MASS INDEX: 40.6 KG/M2 | BODY MASS INDEX: 42.1 KG/M2

## 2020-05-18 DIAGNOSIS — G47.33: ICD-10-CM

## 2020-05-18 DIAGNOSIS — F32.9: ICD-10-CM

## 2020-05-18 DIAGNOSIS — M84.652A: ICD-10-CM

## 2020-05-18 DIAGNOSIS — E78.5: ICD-10-CM

## 2020-05-18 DIAGNOSIS — N18.3: ICD-10-CM

## 2020-05-18 DIAGNOSIS — J45.909: ICD-10-CM

## 2020-05-18 DIAGNOSIS — Z95.3: ICD-10-CM

## 2020-05-18 DIAGNOSIS — G62.9: ICD-10-CM
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
## 2020-05-18 DIAGNOSIS — E66.01: ICD-10-CM

## 2020-05-18 DIAGNOSIS — S72.142A: Primary | ICD-10-CM
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
## 2020-05-18 DIAGNOSIS — Z79.899: ICD-10-CM

## 2020-05-18 DIAGNOSIS — I25.10: ICD-10-CM

## 2020-05-18 DIAGNOSIS — M85.852: ICD-10-CM
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
## 2020-05-18 DIAGNOSIS — M19.90: ICD-10-CM

## 2020-05-18 DIAGNOSIS — I47.2: ICD-10-CM

## 2020-05-18 DIAGNOSIS — Q61.3: ICD-10-CM
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
## 2020-05-18 DIAGNOSIS — K21.9: ICD-10-CM

## 2020-05-18 DIAGNOSIS — D69.6: ICD-10-CM

## 2020-05-18 DIAGNOSIS — E61.1: ICD-10-CM

## 2020-05-18 DIAGNOSIS — D53.9: ICD-10-CM

## 2020-05-18 DIAGNOSIS — F41.9: ICD-10-CM

## 2020-05-18 DIAGNOSIS — D63.1: ICD-10-CM

## 2020-05-18 DIAGNOSIS — I12.9: ICD-10-CM

## 2020-05-18 DIAGNOSIS — Z96.652: ICD-10-CM

## 2020-05-18 DIAGNOSIS — E83.42: ICD-10-CM

## 2020-05-18 LAB
ANION GAP: 6 (ref 5–15)
BUN SERPL-MCNC: 26 MG/DL (ref 7–18)
BUN/CREAT RATIO: 18.8 RATIO (ref 10–20)
CALCIUM SERPL-MCNC: 10.1 MG/DL (ref 8.5–10.1)
CARBON DIOXIDE: 25 MMOL/L (ref 21–32)
CHLORIDE: 113 MMOL/L (ref 98–107)
DEPRECATED RDW RBC: 61.7 FL (ref 35.1–43.9)
DIFFERENTIAL COMMENT: (no result)
DIFFERENTIAL INDICATED: (no result)
ERYTHROCYTE [DISTWIDTH] IN BLOOD: 18 % (ref 11.6–14.6)
EST GLOM FILT RATE - AFR AMER: 64 ML/MIN (ref 60–?)
ESTIMATED CREATININE CLEARANCE: 42.69 ML/MIN
GLUCOSE: 132 MG/DL (ref 74–106)
HCT VFR BLD AUTO: 34.2 % (ref 40–54)
HEMOGLOBIN: 10.8 G/DL (ref 13–16.5)
HGB BLD-MCNC: 10.8 G/DL (ref 13–16.5)
IMMATURE GRANULOCYTES COUNT: 0.04 X10^3/UL (ref 0–0)
MANUAL DIF COMMENT BLD-IMP: (no result)
MCV RBC: 93.4 FL (ref 80–94)
MEAN CORP HGB CONC: 31.6 G/DL (ref 32–36)
MEAN PLATELET VOL.: 12.7 FL (ref 6.2–12)
NRBC FLAGGED BY ANALYZER: 0 % (ref 0–5)
PLATELET # BLD: 89 K/MM3 (ref 150–450)
PLATELET COUNT: 89 K/MM3 (ref 150–450)
POSITIVE COUNT: YES
POTASSIUM: 4.3 MMOL/L (ref 3.5–5.1)
RBC # BLD AUTO: 3.66 M/MM3 (ref 4.6–6.2)
RBC DISTRIBUTION WIDTH CV: 18 % (ref 11.6–14.6)
RBC DISTRIBUTION WIDTH SD: 61.7 FL (ref 35.1–43.9)
SCAN SMEAR PER REVIEW CRITERIA: (no result)
WBC # BLD AUTO: 6.2 K/MM3 (ref 4.4–11)
WHITE BLOOD COUNT: 6.2 K/MM3 (ref 4.4–11)

## 2020-05-18 PROCEDURE — G0463 HOSPITAL OUTPT CLINIC VISIT: HCPCS

## 2020-05-18 PROCEDURE — 96375 TX/PRO/DX INJ NEW DRUG ADDON: CPT

## 2020-05-18 PROCEDURE — 85018 HEMOGLOBIN: CPT

## 2020-05-18 PROCEDURE — 94640 AIRWAY INHALATION TREATMENT: CPT

## 2020-05-18 PROCEDURE — 83036 HEMOGLOBIN GLYCOSYLATED A1C: CPT

## 2020-05-18 PROCEDURE — 87635 SARS-COV-2 COVID-19 AMP PRB: CPT

## 2020-05-18 PROCEDURE — 99251: CPT

## 2020-05-18 PROCEDURE — 85730 THROMBOPLASTIN TIME PARTIAL: CPT

## 2020-05-18 PROCEDURE — 94762 N-INVAS EAR/PLS OXIMTRY CONT: CPT

## 2020-05-18 PROCEDURE — 76000 FLUOROSCOPY <1 HR PHYS/QHP: CPT

## 2020-05-18 PROCEDURE — 84443 ASSAY THYROID STIM HORMONE: CPT

## 2020-05-18 PROCEDURE — C8929 TTE W OR WO FOL WCON,DOPPLER: HCPCS

## 2020-05-18 PROCEDURE — 82728 ASSAY OF FERRITIN: CPT

## 2020-05-18 PROCEDURE — 83550 IRON BINDING TEST: CPT

## 2020-05-18 PROCEDURE — 83880 ASSAY OF NATRIURETIC PEPTIDE: CPT

## 2020-05-18 PROCEDURE — G2023 SPECIMEN COLLECT COVID-19: HCPCS

## 2020-05-18 PROCEDURE — 99285 EMERGENCY DEPT VISIT HI MDM: CPT

## 2020-05-18 PROCEDURE — 93306 TTE W/DOPPLER COMPLETE: CPT

## 2020-05-18 PROCEDURE — 83735 ASSAY OF MAGNESIUM: CPT

## 2020-05-18 PROCEDURE — U0002 COVID-19 LAB TEST NON-CDC: HCPCS

## 2020-05-18 PROCEDURE — 85610 PROTHROMBIN TIME: CPT

## 2020-05-18 PROCEDURE — 85014 HEMATOCRIT: CPT

## 2020-05-18 PROCEDURE — 71045 X-RAY EXAM CHEST 1 VIEW: CPT

## 2020-05-18 PROCEDURE — 83540 ASSAY OF IRON: CPT

## 2020-05-18 PROCEDURE — C1713 ANCHOR/SCREW BN/BN,TIS/BN: HCPCS

## 2020-05-18 PROCEDURE — 84484 ASSAY OF TROPONIN QUANT: CPT

## 2020-05-18 PROCEDURE — 86901 BLOOD TYPING SEROLOGIC RH(D): CPT

## 2020-05-18 PROCEDURE — 82607 VITAMIN B-12: CPT

## 2020-05-18 PROCEDURE — 86850 RBC ANTIBODY SCREEN: CPT

## 2020-05-18 PROCEDURE — 86900 BLOOD TYPING SEROLOGIC ABO: CPT

## 2020-05-18 PROCEDURE — 97166 OT EVAL MOD COMPLEX 45 MIN: CPT

## 2020-05-18 PROCEDURE — 36415 COLL VENOUS BLD VENIPUNCTURE: CPT

## 2020-05-18 PROCEDURE — 85025 COMPLETE CBC W/AUTO DIFF WBC: CPT

## 2020-05-18 PROCEDURE — 97802 MEDICAL NUTRITION INDIV IN: CPT

## 2020-05-18 PROCEDURE — 73502 X-RAY EXAM HIP UNI 2-3 VIEWS: CPT

## 2020-05-18 PROCEDURE — 81001 URINALYSIS AUTO W/SCOPE: CPT

## 2020-05-18 PROCEDURE — 97530 THERAPEUTIC ACTIVITIES: CPT

## 2020-05-18 PROCEDURE — A4216 STERILE WATER/SALINE, 10 ML: HCPCS

## 2020-05-18 PROCEDURE — 80048 BASIC METABOLIC PNL TOTAL CA: CPT

## 2020-05-18 PROCEDURE — 97163 PT EVAL HIGH COMPLEX 45 MIN: CPT

## 2020-05-18 PROCEDURE — 82746 ASSAY OF FOLIC ACID SERUM: CPT

## 2020-05-18 PROCEDURE — 96374 THER/PROPH/DIAG INJ IV PUSH: CPT

## 2020-05-18 PROCEDURE — 82962 GLUCOSE BLOOD TEST: CPT

## 2020-05-18 PROCEDURE — 93005 ELECTROCARDIOGRAM TRACING: CPT

## 2020-05-18 PROCEDURE — 80053 COMPREHEN METABOLIC PANEL: CPT

## 2020-05-18 RX ADMIN — FENTANYL CITRATE 50 MCG: 50 INJECTION, SOLUTION INTRAMUSCULAR; INTRAVENOUS at 21:19

## 2020-05-18 RX ADMIN — FENTANYL CITRATE 50 MCG: 50 INJECTION, SOLUTION INTRAMUSCULAR; INTRAVENOUS at 22:36

## 2020-05-19 VITALS
OXYGEN SATURATION: 95 % | HEART RATE: 71 BPM | RESPIRATION RATE: 18 BRPM | DIASTOLIC BLOOD PRESSURE: 71 MMHG | SYSTOLIC BLOOD PRESSURE: 146 MMHG | TEMPERATURE: 98.24 F

## 2020-05-19 VITALS
DIASTOLIC BLOOD PRESSURE: 67 MMHG | HEART RATE: 69 BPM | SYSTOLIC BLOOD PRESSURE: 140 MMHG | RESPIRATION RATE: 16 BRPM | OXYGEN SATURATION: 98 %

## 2020-05-19 VITALS
TEMPERATURE: 98 F | OXYGEN SATURATION: 95 % | RESPIRATION RATE: 19 BRPM | HEART RATE: 63 BPM | SYSTOLIC BLOOD PRESSURE: 114 MMHG | DIASTOLIC BLOOD PRESSURE: 63 MMHG

## 2020-05-19 VITALS
HEART RATE: 68 BPM | SYSTOLIC BLOOD PRESSURE: 154 MMHG | RESPIRATION RATE: 16 BRPM | DIASTOLIC BLOOD PRESSURE: 53 MMHG | OXYGEN SATURATION: 98 %

## 2020-05-19 VITALS
RESPIRATION RATE: 18 BRPM | HEART RATE: 70 BPM | TEMPERATURE: 97.88 F | OXYGEN SATURATION: 94 % | DIASTOLIC BLOOD PRESSURE: 74 MMHG | SYSTOLIC BLOOD PRESSURE: 145 MMHG

## 2020-05-19 VITALS
DIASTOLIC BLOOD PRESSURE: 71 MMHG | HEART RATE: 64 BPM | SYSTOLIC BLOOD PRESSURE: 106 MMHG | RESPIRATION RATE: 16 BRPM | OXYGEN SATURATION: 98 %

## 2020-05-19 VITALS
TEMPERATURE: 97.6 F | DIASTOLIC BLOOD PRESSURE: 53 MMHG | OXYGEN SATURATION: 95 % | HEART RATE: 73 BPM | RESPIRATION RATE: 16 BRPM | SYSTOLIC BLOOD PRESSURE: 106 MMHG

## 2020-05-19 VITALS
TEMPERATURE: 97.8 F | DIASTOLIC BLOOD PRESSURE: 67 MMHG | OXYGEN SATURATION: 98 % | HEART RATE: 69 BPM | RESPIRATION RATE: 16 BRPM | SYSTOLIC BLOOD PRESSURE: 140 MMHG

## 2020-05-19 VITALS
OXYGEN SATURATION: 98 % | RESPIRATION RATE: 16 BRPM | DIASTOLIC BLOOD PRESSURE: 53 MMHG | HEART RATE: 68 BPM | SYSTOLIC BLOOD PRESSURE: 144 MMHG

## 2020-05-19 VITALS
HEART RATE: 71 BPM | DIASTOLIC BLOOD PRESSURE: 51 MMHG | RESPIRATION RATE: 18 BRPM | OXYGEN SATURATION: 92 % | TEMPERATURE: 98.3 F | SYSTOLIC BLOOD PRESSURE: 93 MMHG

## 2020-05-19 VITALS
RESPIRATION RATE: 18 BRPM | SYSTOLIC BLOOD PRESSURE: 137 MMHG | OXYGEN SATURATION: 98 % | HEART RATE: 72 BPM | TEMPERATURE: 98.3 F | DIASTOLIC BLOOD PRESSURE: 73 MMHG

## 2020-05-19 VITALS — HEART RATE: 68 BPM

## 2020-05-19 VITALS
RESPIRATION RATE: 16 BRPM | HEART RATE: 68 BPM | SYSTOLIC BLOOD PRESSURE: 101 MMHG | DIASTOLIC BLOOD PRESSURE: 58 MMHG | TEMPERATURE: 97.3 F | OXYGEN SATURATION: 93 %

## 2020-05-19 VITALS — HEART RATE: 82 BPM | RESPIRATION RATE: 18 BRPM

## 2020-05-19 VITALS — HEART RATE: 74 BPM

## 2020-05-19 VITALS
SYSTOLIC BLOOD PRESSURE: 140 MMHG | HEART RATE: 65 BPM | DIASTOLIC BLOOD PRESSURE: 67 MMHG | OXYGEN SATURATION: 97 % | TEMPERATURE: 98.06 F | RESPIRATION RATE: 18 BRPM

## 2020-05-19 VITALS — OXYGEN SATURATION: 95 %

## 2020-05-19 VITALS — HEART RATE: 67 BPM

## 2020-05-19 VITALS — HEART RATE: 85 BPM

## 2020-05-19 VITALS — HEART RATE: 66 BPM

## 2020-05-19 VITALS — OXYGEN SATURATION: 95 % | HEART RATE: 79 BPM | RESPIRATION RATE: 18 BRPM

## 2020-05-19 LAB
ALANINE AMINOTRANSFER ALT/SGPT: 29 U/L (ref 16–61)
ALBUMIN SERPL-MCNC: 3.1 G/DL (ref 3.2–5)
ALKALINE PHOSPHATASE: 44 U/L (ref 45–117)
ANION GAP: 6 (ref 5–15)
APTT PPP: 31.1 SECONDS (ref 24.1–36.2)
AST(SGOT): 42 U/L (ref 15–37)
BNP,B-TYPE NATRIURETIC PEPTIDE: 61.2 PG/ML (ref 0–100)
BUN SERPL-MCNC: 22 MG/DL (ref 7–18)
BUN/CREAT RATIO: 18 RATIO (ref 10–20)
CALCIUM SERPL-MCNC: 10 MG/DL (ref 8.5–10.1)
CARBON DIOXIDE: 24 MMOL/L (ref 21–32)
CHLORIDE: 113 MMOL/L (ref 98–107)
DEPRECATED RDW RBC: 61.3 FL (ref 35.1–43.9)
DIFFERENTIAL COMMENT: (no result)
DIFFERENTIAL INDICATED: (no result)
ERYTHROCYTE [DISTWIDTH] IN BLOOD: 17.9 % (ref 11.6–14.6)
EST GLOM FILT RATE - AFR AMER: 73 ML/MIN (ref 60–?)
ESTIMATED CREATININE CLEARANCE: 48.29 ML/MIN
GLOBULIN: 3.2 G/DL (ref 2.2–4.2)
GLUCOSE: 143 MG/DL (ref 74–106)
HCT VFR BLD AUTO: 32.1 % (ref 40–54)
HCT VFR BLD AUTO: 32.6 % (ref 40–54)
HEMOGLOBIN: 10 G/DL (ref 13–16.5)
HEMOGLOBIN: 9.6 G/DL (ref 13–16.5)
HGB BLD-MCNC: 10 G/DL (ref 13–16.5)
HGB BLD-MCNC: 9.6 G/DL (ref 13–16.5)
IMMATURE GRANULOCYTES COUNT: 0.04 X10^3/UL (ref 0–0)
MAGNESIUM: 1.6 MG/DL (ref 1.6–2.6)
MANUAL DIF COMMENT BLD-IMP: (no result)
MCV RBC: 93.9 FL (ref 80–94)
MEAN CORP HGB CONC: 30.7 G/DL (ref 32–36)
MEAN PLATELET VOL.: 12.9 FL (ref 6.2–12)
MUCOUS THREADS URNS QL MICRO: (no result) /HPF
NRBC FLAGGED BY ANALYZER: 0 % (ref 0–5)
PLATELET # BLD: 78 K/MM3 (ref 150–450)
PLATELET COUNT: 78 K/MM3 (ref 150–450)
POSITIVE COUNT: YES
POSITIVE DIFFERENTIAL: YES
POTASSIUM: 4.3 MMOL/L (ref 3.5–5.1)
PROTHROMBIN TIME (PROTIME)PT.: 15.2 SECONDS (ref 11.7–14.9)
RBC # BLD AUTO: 3.47 M/MM3 (ref 4.6–6.2)
RBC DISTRIBUTION WIDTH CV: 17.9 % (ref 11.6–14.6)
RBC DISTRIBUTION WIDTH SD: 61.3 FL (ref 35.1–43.9)
RBC UR QL: (no result) /HPF (ref 0–5)
RBC UR QL: 10 /UL
SCAN SMEAR PER REVIEW CRITERIA: (no result)
SP GR UR: 1.02 (ref 1–1.03)
SQUAMOUS URNS QL MICRO: (no result) /HPF (ref 0–5)
URINE PRESERVATIVE: (no result)
WBC # BLD AUTO: 6.9 K/MM3 (ref 4.4–11)
WHITE BLOOD COUNT: 6.9 K/MM3 (ref 4.4–11)

## 2020-05-19 PROCEDURE — 0QS736Z REPOSITION LEFT UPPER FEMUR WITH INTRAMEDULLARY INTERNAL FIXATION DEVICE, PERCUTANEOUS APPROACH: ICD-10-PCS | Performed by: ORTHOPAEDIC SURGERY

## 2020-05-19 RX ADMIN — CEFAZOLIN SODIUM 150 GM: 1 INJECTION, SOLUTION INTRAVENOUS at 23:18

## 2020-05-19 RX ADMIN — SODIUM CHLORIDE, PRESERVATIVE FREE 0 ML: 5 INJECTION INTRAVENOUS at 02:30

## 2020-05-19 RX ADMIN — SODIUM CHLORIDE 75 ML: 9 INJECTION, SOLUTION INTRAVENOUS at 02:29

## 2020-05-19 RX ADMIN — SODIUM CHLORIDE 75 ML: 9 INJECTION, SOLUTION INTRAVENOUS at 17:28

## 2020-05-20 VITALS
RESPIRATION RATE: 18 BRPM | HEART RATE: 90 BPM | OXYGEN SATURATION: 99 % | DIASTOLIC BLOOD PRESSURE: 65 MMHG | TEMPERATURE: 98.7 F | SYSTOLIC BLOOD PRESSURE: 121 MMHG

## 2020-05-20 VITALS — HEART RATE: 90 BPM

## 2020-05-20 VITALS
RESPIRATION RATE: 18 BRPM | SYSTOLIC BLOOD PRESSURE: 104 MMHG | OXYGEN SATURATION: 99 % | HEART RATE: 77 BPM | TEMPERATURE: 97.88 F | DIASTOLIC BLOOD PRESSURE: 58 MMHG

## 2020-05-20 VITALS — RESPIRATION RATE: 18 BRPM | HEART RATE: 88 BPM

## 2020-05-20 VITALS
HEART RATE: 95 BPM | OXYGEN SATURATION: 96 % | DIASTOLIC BLOOD PRESSURE: 70 MMHG | RESPIRATION RATE: 18 BRPM | SYSTOLIC BLOOD PRESSURE: 134 MMHG | TEMPERATURE: 98.9 F

## 2020-05-20 VITALS
HEART RATE: 77 BPM | RESPIRATION RATE: 18 BRPM | TEMPERATURE: 98 F | DIASTOLIC BLOOD PRESSURE: 70 MMHG | OXYGEN SATURATION: 94 % | SYSTOLIC BLOOD PRESSURE: 120 MMHG

## 2020-05-20 VITALS
SYSTOLIC BLOOD PRESSURE: 99 MMHG | OXYGEN SATURATION: 97 % | RESPIRATION RATE: 18 BRPM | HEART RATE: 68 BPM | TEMPERATURE: 97.5 F | DIASTOLIC BLOOD PRESSURE: 44 MMHG

## 2020-05-20 VITALS
TEMPERATURE: 99.5 F | DIASTOLIC BLOOD PRESSURE: 70 MMHG | HEART RATE: 87 BPM | OXYGEN SATURATION: 97 % | RESPIRATION RATE: 18 BRPM | SYSTOLIC BLOOD PRESSURE: 140 MMHG

## 2020-05-20 VITALS — OXYGEN SATURATION: 93 %

## 2020-05-20 VITALS
OXYGEN SATURATION: 97 % | RESPIRATION RATE: 18 BRPM | DIASTOLIC BLOOD PRESSURE: 48 MMHG | SYSTOLIC BLOOD PRESSURE: 90 MMHG | HEART RATE: 58 BPM | TEMPERATURE: 98.1 F

## 2020-05-20 VITALS
SYSTOLIC BLOOD PRESSURE: 89 MMHG | TEMPERATURE: 98.24 F | RESPIRATION RATE: 18 BRPM | HEART RATE: 60 BPM | DIASTOLIC BLOOD PRESSURE: 47 MMHG | OXYGEN SATURATION: 94 %

## 2020-05-20 VITALS — OXYGEN SATURATION: 94 % | RESPIRATION RATE: 20 BRPM | HEART RATE: 77 BPM

## 2020-05-20 VITALS — HEART RATE: 85 BPM

## 2020-05-20 VITALS — HEART RATE: 55 BPM

## 2020-05-20 VITALS — HEART RATE: 87 BPM

## 2020-05-20 VITALS — RESPIRATION RATE: 18 BRPM

## 2020-05-20 LAB
ANION GAP: 5 (ref 5–15)
BUN SERPL-MCNC: 23 MG/DL (ref 7–18)
BUN/CREAT RATIO: 14.2 RATIO (ref 10–20)
CALCIUM SERPL-MCNC: 9.3 MG/DL (ref 8.5–10.1)
CARBON DIOXIDE: 25 MMOL/L (ref 21–32)
CHLORIDE: 114 MMOL/L (ref 98–107)
DEPRECATED RDW RBC: 62.5 FL (ref 35.1–43.9)
DIFFERENTIAL COMMENT: (no result)
DIFFERENTIAL INDICATED: (no result)
ERYTHROCYTE [DISTWIDTH] IN BLOOD: 18 % (ref 11.6–14.6)
EST GLOM FILT RATE - AFR AMER: 53 ML/MIN (ref 60–?)
ESTIMATED CREATININE CLEARANCE: 36.37 ML/MIN
GLUCOSE: 124 MG/DL (ref 74–106)
HCT VFR BLD AUTO: 29.5 % (ref 40–54)
HEMOGLOBIN: 8.8 G/DL (ref 13–16.5)
HGB BLD-MCNC: 8.8 G/DL (ref 13–16.5)
IMMATURE GRANULOCYTES COUNT: 0.02 X10^3/UL (ref 0–0)
MAGNESIUM: 1.8 MG/DL (ref 1.6–2.6)
MANUAL DIF COMMENT BLD-IMP: (no result)
MCV RBC: 96.1 FL (ref 80–94)
MEAN CORP HGB CONC: 29.8 G/DL (ref 32–36)
MEAN PLATELET VOL.: 12.2 FL (ref 6.2–12)
NRBC FLAGGED BY ANALYZER: 0 % (ref 0–5)
PLATELET # BLD: 75 K/MM3 (ref 150–450)
PLATELET COUNT: 75 K/MM3 (ref 150–450)
POLYCHROMASIA BLD QL SMEAR: (no result)
POSITIVE COUNT: YES
POSITIVE DIFFERENTIAL: YES
POTASSIUM: 4.3 MMOL/L (ref 3.5–5.1)
RBC # BLD AUTO: 3.07 M/MM3 (ref 4.6–6.2)
RBC DISTRIBUTION WIDTH CV: 18 % (ref 11.6–14.6)
RBC DISTRIBUTION WIDTH SD: 62.5 FL (ref 35.1–43.9)
SCAN SMEAR PER REVIEW CRITERIA: (no result)
WBC # BLD AUTO: 6.5 K/MM3 (ref 4.4–11)
WHITE BLOOD COUNT: 6.5 K/MM3 (ref 4.4–11)

## 2020-05-20 RX ADMIN — SODIUM CHLORIDE 999 ML: 9 INJECTION, SOLUTION INTRAVENOUS at 04:23

## 2020-05-20 RX ADMIN — Medication 3 MG: at 22:02

## 2020-05-20 RX ADMIN — CEFAZOLIN SODIUM 150 GM: 1 INJECTION, SOLUTION INTRAVENOUS at 08:29

## 2020-05-20 RX ADMIN — SODIUM CHLORIDE 125 ML: 9 INJECTION, SOLUTION INTRAVENOUS at 22:08

## 2020-05-20 RX ADMIN — SODIUM CHLORIDE 125 ML: 9 INJECTION, SOLUTION INTRAVENOUS at 08:39

## 2020-05-20 RX ADMIN — SODIUM CHLORIDE, PRESERVATIVE FREE 0 ML: 5 INJECTION INTRAVENOUS at 21:58

## 2020-05-21 ENCOUNTER — HOSPITAL ENCOUNTER (INPATIENT)
Age: 80
LOS: 30 days | Discharge: HOME HEALTH | DRG: 560 | End: 2020-06-20
Payer: MEDICARE

## 2020-05-21 VITALS — BODY MASS INDEX: 42.7 KG/M2 | BODY MASS INDEX: 40.6 KG/M2

## 2020-05-21 VITALS
RESPIRATION RATE: 16 BRPM | TEMPERATURE: 98.1 F | DIASTOLIC BLOOD PRESSURE: 57 MMHG | OXYGEN SATURATION: 99 % | SYSTOLIC BLOOD PRESSURE: 116 MMHG | HEART RATE: 69 BPM

## 2020-05-21 VITALS — HEART RATE: 77 BPM

## 2020-05-21 VITALS
DIASTOLIC BLOOD PRESSURE: 67 MMHG | TEMPERATURE: 98.4 F | OXYGEN SATURATION: 94 % | HEART RATE: 72 BPM | RESPIRATION RATE: 16 BRPM | SYSTOLIC BLOOD PRESSURE: 123 MMHG

## 2020-05-21 VITALS
TEMPERATURE: 98.42 F | SYSTOLIC BLOOD PRESSURE: 114 MMHG | RESPIRATION RATE: 16 BRPM | HEART RATE: 77 BPM | OXYGEN SATURATION: 93 % | DIASTOLIC BLOOD PRESSURE: 52 MMHG

## 2020-05-21 VITALS
RESPIRATION RATE: 16 BRPM | HEART RATE: 73 BPM | DIASTOLIC BLOOD PRESSURE: 67 MMHG | TEMPERATURE: 98.06 F | SYSTOLIC BLOOD PRESSURE: 139 MMHG | OXYGEN SATURATION: 96 %

## 2020-05-21 VITALS — HEART RATE: 77 BPM | RESPIRATION RATE: 16 BRPM | OXYGEN SATURATION: 95 %

## 2020-05-21 VITALS — HEART RATE: 76 BPM

## 2020-05-21 VITALS — OXYGEN SATURATION: 97 % | HEART RATE: 78 BPM

## 2020-05-21 VITALS — HEART RATE: 72 BPM

## 2020-05-21 VITALS — HEART RATE: 70 BPM

## 2020-05-21 DIAGNOSIS — B35.4: ICD-10-CM

## 2020-05-21 DIAGNOSIS — M10.9: ICD-10-CM

## 2020-05-21 DIAGNOSIS — E66.01: ICD-10-CM

## 2020-05-21 DIAGNOSIS — M19.90: ICD-10-CM

## 2020-05-21 DIAGNOSIS — D69.6: ICD-10-CM

## 2020-05-21 DIAGNOSIS — D50.9: ICD-10-CM

## 2020-05-21 DIAGNOSIS — K21.9: ICD-10-CM

## 2020-05-21 DIAGNOSIS — S72.002D: Primary | ICD-10-CM

## 2020-05-21 DIAGNOSIS — N32.81: ICD-10-CM

## 2020-05-21 DIAGNOSIS — I25.10: ICD-10-CM

## 2020-05-21 DIAGNOSIS — F32.9: ICD-10-CM

## 2020-05-21 DIAGNOSIS — G47.33: ICD-10-CM

## 2020-05-21 DIAGNOSIS — D63.1: ICD-10-CM

## 2020-05-21 DIAGNOSIS — J45.909: ICD-10-CM

## 2020-05-21 DIAGNOSIS — Q61.3: ICD-10-CM

## 2020-05-21 DIAGNOSIS — Z95.3: ICD-10-CM

## 2020-05-21 DIAGNOSIS — E78.5: ICD-10-CM

## 2020-05-21 DIAGNOSIS — F41.9: ICD-10-CM

## 2020-05-21 DIAGNOSIS — N18.3: ICD-10-CM
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
## 2020-05-21 DIAGNOSIS — I12.9: ICD-10-CM

## 2020-05-21 LAB
ANION GAP: 5 (ref 5–15)
BUN SERPL-MCNC: 27 MG/DL (ref 7–18)
BUN/CREAT RATIO: 16.6 RATIO (ref 10–20)
CALCIUM SERPL-MCNC: 9.1 MG/DL (ref 8.5–10.1)
CARBON DIOXIDE: 25 MMOL/L (ref 21–32)
CHLORIDE: 112 MMOL/L (ref 98–107)
DEPRECATED RDW RBC: 63.4 FL (ref 35.1–43.9)
ERYTHROCYTE [DISTWIDTH] IN BLOOD: 18.2 % (ref 11.6–14.6)
EST GLOM FILT RATE - AFR AMER: 53 ML/MIN (ref 60–?)
ESTIMATED CREATININE CLEARANCE: 36.15 ML/MIN
FERRITIN SERPL-MCNC: 24 NG/ML (ref 26–388)
FOLATES, (FOLIC ACID): 15.2 NG/ML (ref 3.1–55.4)
GLUCOSE: 123 MG/DL (ref 74–106)
HCT VFR BLD AUTO: 24.3 % (ref 40–54)
HCT VFR BLD AUTO: 24.9 % (ref 40–54)
HEMOGLOBIN: 7.3 G/DL (ref 13–16.5)
HEMOGLOBIN: 7.5 G/DL (ref 13–16.5)
HGB BLD-MCNC: 7.3 G/DL (ref 13–16.5)
HGB BLD-MCNC: 7.5 G/DL (ref 13–16.5)
IMMATURE GRANULOCYTES COUNT: 0.03 X10^3/UL (ref 0–0)
IRON BINDING CAPACITY,TOTAL: 284 UG/DL (ref 250–450)
IRON SATN MFR SERPL: 6.7 % (ref 15–55)
IRON SPEC-MCNT: 19 UG/DL (ref 65–175)
MCV RBC: 96 FL (ref 80–94)
MEAN CORP HGB CONC: 30 G/DL (ref 32–36)
MEAN PLATELET VOL.: 13 FL (ref 6.2–12)
NRBC FLAGGED BY ANALYZER: 0 % (ref 0–5)
PLATELET # BLD: 73 K/MM3 (ref 150–450)
PLATELET COUNT: 73 K/MM3 (ref 150–450)
POSITIVE COUNT: YES
POTASSIUM: 4.3 MMOL/L (ref 3.5–5.1)
RBC # BLD AUTO: 2.53 M/MM3 (ref 4.6–6.2)
RBC DISTRIBUTION WIDTH CV: 18.2 % (ref 11.6–14.6)
RBC DISTRIBUTION WIDTH SD: 63.4 FL (ref 35.1–43.9)
VITAMIN B12: 315 PG/ML (ref 211–911)
WBC # BLD AUTO: 5.6 K/MM3 (ref 4.4–11)
WHITE BLOOD COUNT: 5.6 K/MM3 (ref 4.4–11)

## 2020-05-21 PROCEDURE — 97535 SELF CARE MNGMENT TRAINING: CPT

## 2020-05-21 PROCEDURE — 85018 HEMOGLOBIN: CPT

## 2020-05-21 PROCEDURE — G2023 SPECIMEN COLLECT COVID-19: HCPCS

## 2020-05-21 PROCEDURE — 94762 N-INVAS EAR/PLS OXIMTRY CONT: CPT

## 2020-05-21 PROCEDURE — 36430 TRANSFUSION BLD/BLD COMPNT: CPT

## 2020-05-21 PROCEDURE — 97162 PT EVAL MOD COMPLEX 30 MIN: CPT

## 2020-05-21 PROCEDURE — 97166 OT EVAL MOD COMPLEX 45 MIN: CPT

## 2020-05-21 PROCEDURE — U0004 COV-19 TEST NON-CDC HGH THRU: HCPCS

## 2020-05-21 PROCEDURE — 80048 BASIC METABOLIC PNL TOTAL CA: CPT

## 2020-05-21 PROCEDURE — 86922 COMPATIBILITY TEST ANTIGLOB: CPT

## 2020-05-21 PROCEDURE — 86900 BLOOD TYPING SEROLOGIC ABO: CPT

## 2020-05-21 PROCEDURE — 86901 BLOOD TYPING SEROLOGIC RH(D): CPT

## 2020-05-21 PROCEDURE — 36415 COLL VENOUS BLD VENIPUNCTURE: CPT

## 2020-05-21 PROCEDURE — 87635 SARS-COV-2 COVID-19 AMP PRB: CPT

## 2020-05-21 PROCEDURE — 97110 THERAPEUTIC EXERCISES: CPT

## 2020-05-21 PROCEDURE — P9016 RBC LEUKOCYTES REDUCED: HCPCS

## 2020-05-21 PROCEDURE — 97530 THERAPEUTIC ACTIVITIES: CPT

## 2020-05-21 PROCEDURE — 85014 HEMATOCRIT: CPT

## 2020-05-21 PROCEDURE — 86850 RBC ANTIBODY SCREEN: CPT

## 2020-05-21 PROCEDURE — 97802 MEDICAL NUTRITION INDIV IN: CPT

## 2020-05-21 PROCEDURE — 86920 COMPATIBILITY TEST SPIN: CPT

## 2020-05-21 PROCEDURE — 97533 SENSORY INTEGRATION: CPT

## 2020-05-21 PROCEDURE — 85025 COMPLETE CBC W/AUTO DIFF WBC: CPT

## 2020-05-21 PROCEDURE — 97116 GAIT TRAINING THERAPY: CPT

## 2020-05-21 RX ADMIN — SODIUM CHLORIDE 125 ML: 9 INJECTION, SOLUTION INTRAVENOUS at 06:12

## 2020-05-21 RX ADMIN — DOCUSATE SODIUM 50 MG AND SENNOSIDES 8.6 MG 2 TABLET: 8.6; 5 TABLET, FILM COATED ORAL at 10:06

## 2020-05-22 VITALS
RESPIRATION RATE: 18 BRPM | DIASTOLIC BLOOD PRESSURE: 65 MMHG | SYSTOLIC BLOOD PRESSURE: 142 MMHG | OXYGEN SATURATION: 97 % | TEMPERATURE: 98.4 F | HEART RATE: 66 BPM

## 2020-05-22 LAB
ANION GAP: 4 (ref 5–15)
BUN SERPL-MCNC: 32 MG/DL (ref 7–18)
BUN/CREAT RATIO: 17.3 RATIO (ref 10–20)
CALCIUM SERPL-MCNC: 9.5 MG/DL (ref 8.5–10.1)
CARBON DIOXIDE: 25 MMOL/L (ref 21–32)
CHLORIDE: 111 MMOL/L (ref 98–107)
DEPRECATED RDW RBC: 61.9 FL (ref 35.1–43.9)
DIFFERENTIAL COMMENT: (no result)
DIFFERENTIAL INDICATED: (no result)
ERYTHROCYTE [DISTWIDTH] IN BLOOD: 17.9 % (ref 11.6–14.6)
EST GLOM FILT RATE - AFR AMER: 45 ML/MIN (ref 60–?)
ESTIMATED CREATININE CLEARANCE: 31.85 ML/MIN
GLUCOSE: 115 MG/DL (ref 74–106)
HCT VFR BLD AUTO: 24.3 % (ref 40–54)
HEMOGLOBIN: 7.4 G/DL (ref 13–16.5)
HGB BLD-MCNC: 7.4 G/DL (ref 13–16.5)
IMMATURE GRANULOCYTES COUNT: 0.02 X10^3/UL (ref 0–0)
MANUAL DIF COMMENT BLD-IMP: (no result)
MCV RBC: 95.7 FL (ref 80–94)
MEAN CORP HGB CONC: 30.5 G/DL (ref 32–36)
MEAN PLATELET VOL.: 12.4 FL (ref 6.2–12)
NRBC FLAGGED BY ANALYZER: 0 % (ref 0–5)
PLATELET # BLD: 81 K/MM3 (ref 150–450)
PLATELET COUNT: 81 K/MM3 (ref 150–450)
POSITIVE COUNT: YES
POSITIVE DIFFERENTIAL: YES
POTASSIUM: 4.4 MMOL/L (ref 3.5–5.1)
RBC # BLD AUTO: 2.54 M/MM3 (ref 4.6–6.2)
RBC DISTRIBUTION WIDTH CV: 17.9 % (ref 11.6–14.6)
RBC DISTRIBUTION WIDTH SD: 61.9 FL (ref 35.1–43.9)
SCAN SMEAR PER REVIEW CRITERIA: (no result)
WBC # BLD AUTO: 4.8 K/MM3 (ref 4.4–11)
WHITE BLOOD COUNT: 4.8 K/MM3 (ref 4.4–11)

## 2020-05-23 ENCOUNTER — HOSPITAL ENCOUNTER (OUTPATIENT)
Dept: HOSPITAL 100 - MEDOUTP | Age: 80
Discharge: NURSING, REHAB OR LONG-TERM CARE | End: 2020-05-23
Payer: MEDICARE

## 2020-05-23 VITALS
HEART RATE: 73 BPM | TEMPERATURE: 98.96 F | SYSTOLIC BLOOD PRESSURE: 153 MMHG | OXYGEN SATURATION: 96 % | RESPIRATION RATE: 18 BRPM | DIASTOLIC BLOOD PRESSURE: 86 MMHG

## 2020-05-23 VITALS
OXYGEN SATURATION: 94 % | SYSTOLIC BLOOD PRESSURE: 128 MMHG | DIASTOLIC BLOOD PRESSURE: 82 MMHG | TEMPERATURE: 97.88 F | HEART RATE: 66 BPM | RESPIRATION RATE: 18 BRPM

## 2020-05-23 VITALS
OXYGEN SATURATION: 94 % | RESPIRATION RATE: 18 BRPM | SYSTOLIC BLOOD PRESSURE: 151 MMHG | DIASTOLIC BLOOD PRESSURE: 78 MMHG | TEMPERATURE: 98.24 F | HEART RATE: 72 BPM

## 2020-05-23 VITALS
OXYGEN SATURATION: 96 % | HEART RATE: 71 BPM | RESPIRATION RATE: 18 BRPM | DIASTOLIC BLOOD PRESSURE: 56 MMHG | SYSTOLIC BLOOD PRESSURE: 115 MMHG | TEMPERATURE: 98.78 F

## 2020-05-23 VITALS
HEART RATE: 71 BPM | OXYGEN SATURATION: 95 % | DIASTOLIC BLOOD PRESSURE: 83 MMHG | RESPIRATION RATE: 18 BRPM | TEMPERATURE: 98.96 F | SYSTOLIC BLOOD PRESSURE: 137 MMHG

## 2020-05-23 VITALS
OXYGEN SATURATION: 95 % | SYSTOLIC BLOOD PRESSURE: 110 MMHG | HEART RATE: 69 BPM | RESPIRATION RATE: 18 BRPM | TEMPERATURE: 98.42 F | DIASTOLIC BLOOD PRESSURE: 69 MMHG

## 2020-05-23 VITALS
HEART RATE: 80 BPM | RESPIRATION RATE: 18 BRPM | SYSTOLIC BLOOD PRESSURE: 101 MMHG | DIASTOLIC BLOOD PRESSURE: 59 MMHG | TEMPERATURE: 98.9 F | OXYGEN SATURATION: 94 %

## 2020-05-23 VITALS
TEMPERATURE: 98.3 F | HEART RATE: 68 BPM | SYSTOLIC BLOOD PRESSURE: 111 MMHG | DIASTOLIC BLOOD PRESSURE: 62 MMHG | OXYGEN SATURATION: 97 % | RESPIRATION RATE: 18 BRPM

## 2020-05-23 VITALS
OXYGEN SATURATION: 96 % | SYSTOLIC BLOOD PRESSURE: 125 MMHG | DIASTOLIC BLOOD PRESSURE: 57 MMHG | TEMPERATURE: 98.6 F | HEART RATE: 69 BPM | RESPIRATION RATE: 18 BRPM

## 2020-05-23 VITALS
SYSTOLIC BLOOD PRESSURE: 126 MMHG | OXYGEN SATURATION: 98 % | RESPIRATION RATE: 18 BRPM | TEMPERATURE: 98.42 F | DIASTOLIC BLOOD PRESSURE: 68 MMHG | HEART RATE: 70 BPM

## 2020-05-23 VITALS
DIASTOLIC BLOOD PRESSURE: 59 MMHG | TEMPERATURE: 98.42 F | HEART RATE: 73 BPM | RESPIRATION RATE: 18 BRPM | SYSTOLIC BLOOD PRESSURE: 124 MMHG | OXYGEN SATURATION: 96 %

## 2020-05-23 VITALS — BODY MASS INDEX: 42.7 KG/M2

## 2020-05-23 VITALS
TEMPERATURE: 98.78 F | DIASTOLIC BLOOD PRESSURE: 60 MMHG | SYSTOLIC BLOOD PRESSURE: 105 MMHG | OXYGEN SATURATION: 95 % | HEART RATE: 77 BPM | RESPIRATION RATE: 18 BRPM

## 2020-05-23 DIAGNOSIS — D63.8: ICD-10-CM

## 2020-05-23 DIAGNOSIS — N18.3: ICD-10-CM

## 2020-05-23 DIAGNOSIS — D50.9: Primary | ICD-10-CM

## 2020-05-23 PROCEDURE — 86901 BLOOD TYPING SEROLOGIC RH(D): CPT

## 2020-05-23 PROCEDURE — 36430 TRANSFUSION BLD/BLD COMPNT: CPT

## 2020-05-23 PROCEDURE — 86922 COMPATIBILITY TEST ANTIGLOB: CPT

## 2020-05-23 PROCEDURE — 86850 RBC ANTIBODY SCREEN: CPT

## 2020-05-23 PROCEDURE — P9016 RBC LEUKOCYTES REDUCED: HCPCS

## 2020-05-23 PROCEDURE — 86920 COMPATIBILITY TEST SPIN: CPT

## 2020-05-23 PROCEDURE — 86900 BLOOD TYPING SEROLOGIC ABO: CPT

## 2020-05-24 VITALS
TEMPERATURE: 97.88 F | OXYGEN SATURATION: 93 % | HEART RATE: 72 BPM | SYSTOLIC BLOOD PRESSURE: 125 MMHG | DIASTOLIC BLOOD PRESSURE: 66 MMHG | RESPIRATION RATE: 18 BRPM

## 2020-05-24 LAB
HCT VFR BLD AUTO: 26.7 % (ref 40–54)
HEMOGLOBIN: 8.6 G/DL (ref 13–16.5)
HGB BLD-MCNC: 8.6 G/DL (ref 13–16.5)

## 2020-05-25 VITALS
OXYGEN SATURATION: 97 % | HEART RATE: 79 BPM | TEMPERATURE: 97.88 F | SYSTOLIC BLOOD PRESSURE: 166 MMHG | DIASTOLIC BLOOD PRESSURE: 91 MMHG | RESPIRATION RATE: 18 BRPM

## 2020-05-25 VITALS
HEART RATE: 60 BPM | TEMPERATURE: 98.3 F | OXYGEN SATURATION: 95 % | SYSTOLIC BLOOD PRESSURE: 120 MMHG | RESPIRATION RATE: 18 BRPM | DIASTOLIC BLOOD PRESSURE: 61 MMHG

## 2020-05-26 VITALS — OXYGEN SATURATION: 98 % | HEART RATE: 73 BPM

## 2020-05-26 VITALS
HEART RATE: 74 BPM | RESPIRATION RATE: 18 BRPM | TEMPERATURE: 97.34 F | DIASTOLIC BLOOD PRESSURE: 61 MMHG | SYSTOLIC BLOOD PRESSURE: 132 MMHG | OXYGEN SATURATION: 96 %

## 2020-05-26 VITALS
HEART RATE: 60 BPM | RESPIRATION RATE: 18 BRPM | TEMPERATURE: 97.2 F | OXYGEN SATURATION: 95 % | SYSTOLIC BLOOD PRESSURE: 124 MMHG | DIASTOLIC BLOOD PRESSURE: 74 MMHG

## 2020-05-26 LAB
HCT VFR BLD AUTO: 30.5 % (ref 40–54)
HEMOGLOBIN: 9.4 G/DL (ref 13–16.5)
HGB BLD-MCNC: 9.4 G/DL (ref 13–16.5)

## 2020-05-27 VITALS
TEMPERATURE: 97.16 F | OXYGEN SATURATION: 93 % | SYSTOLIC BLOOD PRESSURE: 109 MMHG | HEART RATE: 66 BPM | DIASTOLIC BLOOD PRESSURE: 53 MMHG | RESPIRATION RATE: 16 BRPM

## 2020-05-27 VITALS
RESPIRATION RATE: 16 BRPM | OXYGEN SATURATION: 97 % | HEART RATE: 67 BPM | TEMPERATURE: 97.8 F | SYSTOLIC BLOOD PRESSURE: 116 MMHG | DIASTOLIC BLOOD PRESSURE: 71 MMHG

## 2020-05-28 VITALS
RESPIRATION RATE: 17 BRPM | TEMPERATURE: 97.88 F | DIASTOLIC BLOOD PRESSURE: 71 MMHG | SYSTOLIC BLOOD PRESSURE: 131 MMHG | OXYGEN SATURATION: 97 % | HEART RATE: 68 BPM

## 2020-05-28 VITALS
TEMPERATURE: 98.06 F | RESPIRATION RATE: 20 BRPM | DIASTOLIC BLOOD PRESSURE: 54 MMHG | SYSTOLIC BLOOD PRESSURE: 100 MMHG | HEART RATE: 68 BPM | OXYGEN SATURATION: 96 %

## 2020-05-29 VITALS
SYSTOLIC BLOOD PRESSURE: 91 MMHG | OXYGEN SATURATION: 94 % | DIASTOLIC BLOOD PRESSURE: 55 MMHG | RESPIRATION RATE: 20 BRPM | HEART RATE: 69 BPM | TEMPERATURE: 98.2 F

## 2020-05-29 VITALS — DIASTOLIC BLOOD PRESSURE: 62 MMHG | HEART RATE: 68 BPM | SYSTOLIC BLOOD PRESSURE: 113 MMHG

## 2020-05-29 VITALS
TEMPERATURE: 97.7 F | DIASTOLIC BLOOD PRESSURE: 76 MMHG | SYSTOLIC BLOOD PRESSURE: 125 MMHG | OXYGEN SATURATION: 95 % | HEART RATE: 69 BPM | RESPIRATION RATE: 18 BRPM

## 2020-05-29 LAB
ANION GAP: 6 (ref 5–15)
ANISOCYTOSIS BLD QL SMEAR: (no result)
ANISOCYTOSIS: (no result)
BUN SERPL-MCNC: 47 MG/DL (ref 7–18)
BUN/CREAT RATIO: 28.7 RATIO (ref 10–20)
CALCIUM SERPL-MCNC: 10.5 MG/DL (ref 8.5–10.1)
CARBON DIOXIDE: 26 MMOL/L (ref 21–32)
CHLORIDE: 107 MMOL/L (ref 98–107)
DEPRECATED RDW RBC: 68.1 FL (ref 35.1–43.9)
DIFFERENTIAL COMMENT: (no result)
DIFFERENTIAL INDICATED: (no result)
ERYTHROCYTE [DISTWIDTH] IN BLOOD: 19.8 % (ref 11.6–14.6)
EST GLOM FILT RATE - AFR AMER: 52 ML/MIN (ref 60–?)
ESTIMATED CREATININE CLEARANCE: 35.92 ML/MIN
GLUCOSE: 105 MG/DL (ref 74–106)
HCT VFR BLD AUTO: 29.6 % (ref 40–54)
HEMOGLOBIN: 9.3 G/DL (ref 13–16.5)
HGB BLD-MCNC: 9.3 G/DL (ref 13–16.5)
IMMATURE GRANULOCYTES COUNT: 0.03 X10^3/UL (ref 0–0)
MANUAL DIF COMMENT BLD-IMP: (no result)
MCV RBC: 96.1 FL (ref 80–94)
MEAN CORP HGB CONC: 31.4 G/DL (ref 32–36)
MEAN PLATELET VOL.: 11.2 FL (ref 6.2–12)
NRBC FLAGGED BY ANALYZER: 0 % (ref 0–5)
PLATELET # BLD: 119 K/MM3 (ref 150–450)
PLATELET COUNT: 119 K/MM3 (ref 150–450)
POSITIVE MORPHOLOGY: YES
POTASSIUM: 4.5 MMOL/L (ref 3.5–5.1)
RBC # BLD AUTO: 3.08 M/MM3 (ref 4.6–6.2)
RBC DISTRIBUTION WIDTH CV: 19.8 % (ref 11.6–14.6)
RBC DISTRIBUTION WIDTH SD: 68.1 FL (ref 35.1–43.9)
SCAN SMEAR PER REVIEW CRITERIA: (no result)
WBC # BLD AUTO: 4.3 K/MM3 (ref 4.4–11)
WHITE BLOOD COUNT: 4.3 K/MM3 (ref 4.4–11)

## 2020-05-30 VITALS
OXYGEN SATURATION: 95 % | TEMPERATURE: 97.8 F | RESPIRATION RATE: 20 BRPM | HEART RATE: 67 BPM | SYSTOLIC BLOOD PRESSURE: 130 MMHG | DIASTOLIC BLOOD PRESSURE: 76 MMHG

## 2020-05-30 VITALS
HEART RATE: 70 BPM | TEMPERATURE: 97.88 F | OXYGEN SATURATION: 96 % | RESPIRATION RATE: 16 BRPM | DIASTOLIC BLOOD PRESSURE: 53 MMHG | SYSTOLIC BLOOD PRESSURE: 95 MMHG

## 2020-05-30 VITALS — RESPIRATION RATE: 16 BRPM | OXYGEN SATURATION: 98 % | HEART RATE: 65 BPM

## 2020-05-31 VITALS
HEART RATE: 73 BPM | SYSTOLIC BLOOD PRESSURE: 110 MMHG | TEMPERATURE: 97.52 F | RESPIRATION RATE: 16 BRPM | OXYGEN SATURATION: 96 % | DIASTOLIC BLOOD PRESSURE: 66 MMHG

## 2020-05-31 VITALS
SYSTOLIC BLOOD PRESSURE: 118 MMHG | OXYGEN SATURATION: 95 % | HEART RATE: 60 BPM | RESPIRATION RATE: 18 BRPM | DIASTOLIC BLOOD PRESSURE: 68 MMHG | TEMPERATURE: 97.3 F

## 2020-06-01 VITALS
HEART RATE: 71 BPM | OXYGEN SATURATION: 97 % | TEMPERATURE: 98.24 F | SYSTOLIC BLOOD PRESSURE: 122 MMHG | RESPIRATION RATE: 16 BRPM | DIASTOLIC BLOOD PRESSURE: 76 MMHG

## 2020-06-01 VITALS
TEMPERATURE: 97.3 F | OXYGEN SATURATION: 95 % | DIASTOLIC BLOOD PRESSURE: 65 MMHG | RESPIRATION RATE: 16 BRPM | SYSTOLIC BLOOD PRESSURE: 112 MMHG | HEART RATE: 68 BPM

## 2020-06-01 VITALS — HEART RATE: 67 BPM | RESPIRATION RATE: 18 BRPM | OXYGEN SATURATION: 98 %

## 2020-06-02 VITALS
SYSTOLIC BLOOD PRESSURE: 128 MMHG | HEART RATE: 61 BPM | DIASTOLIC BLOOD PRESSURE: 56 MMHG | TEMPERATURE: 97.3 F | OXYGEN SATURATION: 98 % | RESPIRATION RATE: 16 BRPM

## 2020-06-02 VITALS
TEMPERATURE: 97.9 F | SYSTOLIC BLOOD PRESSURE: 122 MMHG | DIASTOLIC BLOOD PRESSURE: 64 MMHG | HEART RATE: 74 BPM | OXYGEN SATURATION: 96 % | RESPIRATION RATE: 16 BRPM

## 2020-06-03 VITALS
TEMPERATURE: 97.52 F | HEART RATE: 66 BPM | RESPIRATION RATE: 16 BRPM | DIASTOLIC BLOOD PRESSURE: 76 MMHG | SYSTOLIC BLOOD PRESSURE: 114 MMHG | OXYGEN SATURATION: 95 %

## 2020-06-03 VITALS — OXYGEN SATURATION: 94 % | RESPIRATION RATE: 16 BRPM | HEART RATE: 79 BPM

## 2020-06-03 VITALS
DIASTOLIC BLOOD PRESSURE: 58 MMHG | TEMPERATURE: 97.9 F | RESPIRATION RATE: 16 BRPM | HEART RATE: 70 BPM | OXYGEN SATURATION: 97 % | SYSTOLIC BLOOD PRESSURE: 102 MMHG

## 2020-06-04 VITALS
TEMPERATURE: 98.3 F | RESPIRATION RATE: 18 BRPM | SYSTOLIC BLOOD PRESSURE: 137 MMHG | HEART RATE: 72 BPM | DIASTOLIC BLOOD PRESSURE: 64 MMHG | OXYGEN SATURATION: 92 %

## 2020-06-04 VITALS
SYSTOLIC BLOOD PRESSURE: 133 MMHG | HEART RATE: 70 BPM | TEMPERATURE: 98.24 F | DIASTOLIC BLOOD PRESSURE: 68 MMHG | RESPIRATION RATE: 14 BRPM | OXYGEN SATURATION: 96 %

## 2020-06-05 VITALS — TEMPERATURE: 97.7 F | SYSTOLIC BLOOD PRESSURE: 133 MMHG | HEART RATE: 70 BPM | DIASTOLIC BLOOD PRESSURE: 68 MMHG

## 2020-06-05 VITALS
HEART RATE: 75 BPM | OXYGEN SATURATION: 94 % | RESPIRATION RATE: 18 BRPM | DIASTOLIC BLOOD PRESSURE: 71 MMHG | SYSTOLIC BLOOD PRESSURE: 122 MMHG | TEMPERATURE: 96.5 F

## 2020-06-05 LAB
ANION GAP: 6 (ref 5–15)
ANISOCYTOSIS BLD QL SMEAR: (no result)
ANISOCYTOSIS: (no result)
BUN SERPL-MCNC: 40 MG/DL (ref 7–18)
BUN/CREAT RATIO: 25.3 RATIO (ref 10–20)
CALCIUM SERPL-MCNC: 10.5 MG/DL (ref 8.5–10.1)
CARBON DIOXIDE: 25 MMOL/L (ref 21–32)
CHLORIDE: 110 MMOL/L (ref 98–107)
DEPRECATED RDW RBC: 73.7 FL (ref 35.1–43.9)
DIFFERENTIAL COMMENT: (no result)
DIFFERENTIAL INDICATED: (no result)
ERYTHROCYTE [DISTWIDTH] IN BLOOD: 20.5 % (ref 11.6–14.6)
EST GLOM FILT RATE - AFR AMER: 55 ML/MIN (ref 60–?)
ESTIMATED CREATININE CLEARANCE: 37.29 ML/MIN
GLUCOSE: 108 MG/DL (ref 74–106)
HCT VFR BLD AUTO: 32.9 % (ref 40–54)
HEMOGLOBIN: 9.9 G/DL (ref 13–16.5)
HGB BLD-MCNC: 9.9 G/DL (ref 13–16.5)
IMMATURE GRANULOCYTES COUNT: 0.01 X10^3/UL (ref 0–0)
MANUAL DIF COMMENT BLD-IMP: (no result)
MCV RBC: 100.6 FL (ref 80–94)
MEAN CORP HGB CONC: 30.1 G/DL (ref 32–36)
MEAN PLATELET VOL.: 11.8 FL (ref 6.2–12)
NRBC FLAGGED BY ANALYZER: 0 % (ref 0–5)
PLATELET # BLD: 109 K/MM3 (ref 150–450)
PLATELET COUNT: 109 K/MM3 (ref 150–450)
POSITIVE MORPHOLOGY: YES
POTASSIUM: 4.4 MMOL/L (ref 3.5–5.1)
RBC # BLD AUTO: 3.27 M/MM3 (ref 4.6–6.2)
RBC DISTRIBUTION WIDTH CV: 20.5 % (ref 11.6–14.6)
RBC DISTRIBUTION WIDTH SD: 73.7 FL (ref 35.1–43.9)
SCAN SMEAR PER REVIEW CRITERIA: (no result)
WBC # BLD AUTO: 4.3 K/MM3 (ref 4.4–11)
WHITE BLOOD COUNT: 4.3 K/MM3 (ref 4.4–11)

## 2020-06-06 VITALS — RESPIRATION RATE: 18 BRPM | OXYGEN SATURATION: 95 % | HEART RATE: 67 BPM

## 2020-06-06 VITALS
DIASTOLIC BLOOD PRESSURE: 58 MMHG | RESPIRATION RATE: 22 BRPM | OXYGEN SATURATION: 96 % | TEMPERATURE: 97.16 F | HEART RATE: 67 BPM | SYSTOLIC BLOOD PRESSURE: 105 MMHG

## 2020-06-06 VITALS
RESPIRATION RATE: 18 BRPM | TEMPERATURE: 98.06 F | DIASTOLIC BLOOD PRESSURE: 66 MMHG | OXYGEN SATURATION: 98 % | HEART RATE: 81 BPM | SYSTOLIC BLOOD PRESSURE: 119 MMHG

## 2020-06-07 VITALS
DIASTOLIC BLOOD PRESSURE: 74 MMHG | HEART RATE: 70 BPM | OXYGEN SATURATION: 98 % | TEMPERATURE: 97.7 F | RESPIRATION RATE: 18 BRPM | SYSTOLIC BLOOD PRESSURE: 122 MMHG

## 2020-06-07 VITALS
TEMPERATURE: 98.06 F | HEART RATE: 63 BPM | OXYGEN SATURATION: 96 % | DIASTOLIC BLOOD PRESSURE: 64 MMHG | SYSTOLIC BLOOD PRESSURE: 118 MMHG | RESPIRATION RATE: 20 BRPM

## 2020-06-07 VITALS — RESPIRATION RATE: 18 BRPM | OXYGEN SATURATION: 94 % | HEART RATE: 60 BPM

## 2020-06-08 VITALS — RESPIRATION RATE: 14 BRPM | HEART RATE: 67 BPM | OXYGEN SATURATION: 97 %

## 2020-06-08 VITALS
SYSTOLIC BLOOD PRESSURE: 112 MMHG | TEMPERATURE: 97.88 F | HEART RATE: 67 BPM | OXYGEN SATURATION: 96 % | RESPIRATION RATE: 18 BRPM | DIASTOLIC BLOOD PRESSURE: 62 MMHG

## 2020-06-08 VITALS
SYSTOLIC BLOOD PRESSURE: 122 MMHG | OXYGEN SATURATION: 95 % | DIASTOLIC BLOOD PRESSURE: 65 MMHG | TEMPERATURE: 98 F | HEART RATE: 79 BPM | RESPIRATION RATE: 18 BRPM

## 2020-06-09 VITALS
SYSTOLIC BLOOD PRESSURE: 126 MMHG | OXYGEN SATURATION: 95 % | TEMPERATURE: 96.26 F | HEART RATE: 62 BPM | RESPIRATION RATE: 16 BRPM | DIASTOLIC BLOOD PRESSURE: 76 MMHG

## 2020-06-09 VITALS
DIASTOLIC BLOOD PRESSURE: 56 MMHG | OXYGEN SATURATION: 95 % | RESPIRATION RATE: 18 BRPM | HEART RATE: 70 BPM | SYSTOLIC BLOOD PRESSURE: 102 MMHG | TEMPERATURE: 97.52 F

## 2020-06-10 VITALS
RESPIRATION RATE: 18 BRPM | OXYGEN SATURATION: 97 % | TEMPERATURE: 97.5 F | HEART RATE: 78 BPM | SYSTOLIC BLOOD PRESSURE: 160 MMHG | DIASTOLIC BLOOD PRESSURE: 87 MMHG

## 2020-06-10 VITALS
DIASTOLIC BLOOD PRESSURE: 56 MMHG | HEART RATE: 74 BPM | SYSTOLIC BLOOD PRESSURE: 106 MMHG | OXYGEN SATURATION: 94 % | RESPIRATION RATE: 18 BRPM | TEMPERATURE: 98.24 F

## 2020-06-10 VITALS — OXYGEN SATURATION: 98 %

## 2020-06-11 VITALS
RESPIRATION RATE: 16 BRPM | SYSTOLIC BLOOD PRESSURE: 127 MMHG | HEART RATE: 67 BPM | DIASTOLIC BLOOD PRESSURE: 68 MMHG | OXYGEN SATURATION: 97 % | TEMPERATURE: 97 F

## 2020-06-11 VITALS
RESPIRATION RATE: 17 BRPM | OXYGEN SATURATION: 98 % | HEART RATE: 71 BPM | TEMPERATURE: 98.6 F | SYSTOLIC BLOOD PRESSURE: 121 MMHG | DIASTOLIC BLOOD PRESSURE: 79 MMHG

## 2020-06-12 VITALS
RESPIRATION RATE: 16 BRPM | SYSTOLIC BLOOD PRESSURE: 123 MMHG | DIASTOLIC BLOOD PRESSURE: 80 MMHG | TEMPERATURE: 98.24 F | OXYGEN SATURATION: 94 % | HEART RATE: 76 BPM

## 2020-06-12 VITALS
TEMPERATURE: 98.06 F | DIASTOLIC BLOOD PRESSURE: 65 MMHG | SYSTOLIC BLOOD PRESSURE: 125 MMHG | HEART RATE: 65 BPM | RESPIRATION RATE: 16 BRPM | OXYGEN SATURATION: 98 %

## 2020-06-12 LAB
ANION GAP: 6 (ref 5–15)
BUN SERPL-MCNC: 30 MG/DL (ref 7–18)
BUN/CREAT RATIO: 21.9 RATIO (ref 10–20)
CALCIUM SERPL-MCNC: 10.7 MG/DL (ref 8.5–10.1)
CARBON DIOXIDE: 26 MMOL/L (ref 21–32)
CHLORIDE: 105 MMOL/L (ref 98–107)
DEPRECATED RDW RBC: 72.8 FL (ref 35.1–43.9)
DIFFERENTIAL COMMENT: (no result)
DIFFERENTIAL INDICATED: (no result)
ERYTHROCYTE [DISTWIDTH] IN BLOOD: 20 % (ref 11.6–14.6)
EST GLOM FILT RATE - AFR AMER: 64 ML/MIN (ref 60–?)
ESTIMATED CREATININE CLEARANCE: 43 ML/MIN
GLUCOSE: 104 MG/DL (ref 74–106)
HCT VFR BLD AUTO: 33.7 % (ref 40–54)
HEMOGLOBIN: 10.5 G/DL (ref 13–16.5)
HGB BLD-MCNC: 10.5 G/DL (ref 13–16.5)
IMMATURE GRANULOCYTES COUNT: 0.02 X10^3/UL (ref 0–0)
MACROCYTES BLD QL: (no result)
MACROCYTOSIS: (no result)
MANUAL DIF COMMENT BLD-IMP: (no result)
MCV RBC: 99.7 FL (ref 80–94)
MEAN CORP HGB CONC: 31.2 G/DL (ref 32–36)
MEAN PLATELET VOL.: 11.6 FL (ref 6.2–12)
MICROCYTOSIS: (no result)
NRBC FLAGGED BY ANALYZER: 0 % (ref 0–5)
PLATELET # BLD: 77 K/MM3 (ref 150–450)
PLATELET COUNT: 77 K/MM3 (ref 150–450)
POSITIVE COUNT: YES
POSITIVE MORPHOLOGY: YES
POTASSIUM: 4.6 MMOL/L (ref 3.5–5.1)
RBC # BLD AUTO: 3.38 M/MM3 (ref 4.6–6.2)
RBC DISTRIBUTION WIDTH CV: 20 % (ref 11.6–14.6)
RBC DISTRIBUTION WIDTH SD: 72.8 FL (ref 35.1–43.9)
SCAN SMEAR PER REVIEW CRITERIA: (no result)
WBC # BLD AUTO: 3.8 K/MM3 (ref 4.4–11)
WHITE BLOOD COUNT: 3.8 K/MM3 (ref 4.4–11)

## 2020-06-13 VITALS
DIASTOLIC BLOOD PRESSURE: 72 MMHG | TEMPERATURE: 97.7 F | OXYGEN SATURATION: 95 % | RESPIRATION RATE: 15 BRPM | SYSTOLIC BLOOD PRESSURE: 128 MMHG | HEART RATE: 75 BPM

## 2020-06-13 VITALS
TEMPERATURE: 97.88 F | OXYGEN SATURATION: 92 % | HEART RATE: 56 BPM | SYSTOLIC BLOOD PRESSURE: 94 MMHG | DIASTOLIC BLOOD PRESSURE: 51 MMHG | RESPIRATION RATE: 16 BRPM

## 2020-06-13 VITALS — HEART RATE: 72 BPM | RESPIRATION RATE: 16 BRPM | OXYGEN SATURATION: 97 %

## 2020-06-14 VITALS
HEART RATE: 66 BPM | OXYGEN SATURATION: 98 % | DIASTOLIC BLOOD PRESSURE: 61 MMHG | RESPIRATION RATE: 16 BRPM | TEMPERATURE: 98 F | SYSTOLIC BLOOD PRESSURE: 111 MMHG

## 2020-06-14 VITALS
HEART RATE: 73 BPM | OXYGEN SATURATION: 97 % | DIASTOLIC BLOOD PRESSURE: 70 MMHG | RESPIRATION RATE: 16 BRPM | TEMPERATURE: 98.78 F | SYSTOLIC BLOOD PRESSURE: 105 MMHG

## 2020-06-15 VITALS
DIASTOLIC BLOOD PRESSURE: 83 MMHG | TEMPERATURE: 98.42 F | SYSTOLIC BLOOD PRESSURE: 149 MMHG | OXYGEN SATURATION: 96 % | HEART RATE: 77 BPM | RESPIRATION RATE: 16 BRPM

## 2020-06-15 VITALS — RESPIRATION RATE: 18 BRPM | HEART RATE: 69 BPM | OXYGEN SATURATION: 94 %

## 2020-06-15 VITALS
DIASTOLIC BLOOD PRESSURE: 55 MMHG | SYSTOLIC BLOOD PRESSURE: 99 MMHG | TEMPERATURE: 97.16 F | RESPIRATION RATE: 18 BRPM | HEART RATE: 67 BPM | OXYGEN SATURATION: 95 %

## 2020-06-15 VITALS — OXYGEN SATURATION: 94 %

## 2020-06-16 VITALS
RESPIRATION RATE: 16 BRPM | HEART RATE: 68 BPM | SYSTOLIC BLOOD PRESSURE: 137 MMHG | TEMPERATURE: 98.6 F | OXYGEN SATURATION: 98 % | DIASTOLIC BLOOD PRESSURE: 73 MMHG

## 2020-06-16 VITALS
RESPIRATION RATE: 17 BRPM | HEART RATE: 74 BPM | SYSTOLIC BLOOD PRESSURE: 112 MMHG | OXYGEN SATURATION: 99 % | TEMPERATURE: 97.3 F | DIASTOLIC BLOOD PRESSURE: 65 MMHG

## 2020-06-16 VITALS — OXYGEN SATURATION: 96 %

## 2020-06-17 VITALS
SYSTOLIC BLOOD PRESSURE: 126 MMHG | DIASTOLIC BLOOD PRESSURE: 70 MMHG | HEART RATE: 66 BPM | TEMPERATURE: 98.24 F | OXYGEN SATURATION: 92 % | RESPIRATION RATE: 16 BRPM

## 2020-06-17 VITALS
DIASTOLIC BLOOD PRESSURE: 81 MMHG | SYSTOLIC BLOOD PRESSURE: 129 MMHG | TEMPERATURE: 98.2 F | HEART RATE: 80 BPM | RESPIRATION RATE: 18 BRPM | OXYGEN SATURATION: 95 %

## 2020-06-17 VITALS — OXYGEN SATURATION: 92 % | RESPIRATION RATE: 16 BRPM | HEART RATE: 66 BPM

## 2020-06-18 VITALS
TEMPERATURE: 98.1 F | DIASTOLIC BLOOD PRESSURE: 70 MMHG | SYSTOLIC BLOOD PRESSURE: 116 MMHG | RESPIRATION RATE: 16 BRPM | HEART RATE: 70 BPM

## 2020-06-18 VITALS
RESPIRATION RATE: 16 BRPM | OXYGEN SATURATION: 93 % | TEMPERATURE: 98.24 F | SYSTOLIC BLOOD PRESSURE: 128 MMHG | DIASTOLIC BLOOD PRESSURE: 80 MMHG | HEART RATE: 71 BPM

## 2020-06-19 VITALS
SYSTOLIC BLOOD PRESSURE: 124 MMHG | OXYGEN SATURATION: 96 % | HEART RATE: 72 BPM | TEMPERATURE: 97.7 F | RESPIRATION RATE: 18 BRPM | DIASTOLIC BLOOD PRESSURE: 71 MMHG

## 2020-06-19 VITALS
HEART RATE: 66 BPM | RESPIRATION RATE: 16 BRPM | DIASTOLIC BLOOD PRESSURE: 70 MMHG | TEMPERATURE: 98.3 F | SYSTOLIC BLOOD PRESSURE: 118 MMHG | OXYGEN SATURATION: 96 %

## 2020-06-19 VITALS — OXYGEN SATURATION: 96 % | RESPIRATION RATE: 16 BRPM | HEART RATE: 66 BPM

## 2020-06-19 LAB
ANION GAP: 6 (ref 5–15)
BUN SERPL-MCNC: 32 MG/DL (ref 7–18)
BUN/CREAT RATIO: 22.2 RATIO (ref 10–20)
CALCIUM SERPL-MCNC: 10.7 MG/DL (ref 8.5–10.1)
CARBON DIOXIDE: 25 MMOL/L (ref 21–32)
CHLORIDE: 108 MMOL/L (ref 98–107)
DEPRECATED RDW RBC: 72.8 FL (ref 35.1–43.9)
DIFFERENTIAL COMMENT: (no result)
DIFFERENTIAL INDICATED: (no result)
ERYTHROCYTE [DISTWIDTH] IN BLOOD: 19.9 % (ref 11.6–14.6)
EST GLOM FILT RATE - AFR AMER: 61 ML/MIN (ref 60–?)
ESTIMATED CREATININE CLEARANCE: 40.91 ML/MIN
GLUCOSE: 97 MG/DL (ref 74–106)
HCT VFR BLD AUTO: 34.2 % (ref 40–54)
HEMOGLOBIN: 10.5 G/DL (ref 13–16.5)
HGB BLD-MCNC: 10.5 G/DL (ref 13–16.5)
IMMATURE GRANULOCYTES COUNT: 0.01 X10^3/UL (ref 0–0)
MACROCYTES BLD QL: (no result)
MACROCYTOSIS: (no result)
MANUAL DIF COMMENT BLD-IMP: (no result)
MCV RBC: 100.9 FL (ref 80–94)
MEAN CORP HGB CONC: 30.7 G/DL (ref 32–36)
MEAN PLATELET VOL.: 11.7 FL (ref 6.2–12)
NRBC FLAGGED BY ANALYZER: 0 % (ref 0–5)
PLATELET # BLD: 75 K/MM3 (ref 150–450)
PLATELET COUNT: 75 K/MM3 (ref 150–450)
POSITIVE COUNT: YES
POSITIVE MORPHOLOGY: YES
POTASSIUM: 4.7 MMOL/L (ref 3.5–5.1)
RBC # BLD AUTO: 3.39 M/MM3 (ref 4.6–6.2)
RBC DISTRIBUTION WIDTH CV: 19.9 % (ref 11.6–14.6)
RBC DISTRIBUTION WIDTH SD: 72.8 FL (ref 35.1–43.9)
SCAN SMEAR PER REVIEW CRITERIA: (no result)
WBC # BLD AUTO: 3.7 K/MM3 (ref 4.4–11)
WHITE BLOOD COUNT: 3.7 K/MM3 (ref 4.4–11)

## 2020-06-20 VITALS
HEART RATE: 76 BPM | OXYGEN SATURATION: 97 % | RESPIRATION RATE: 16 BRPM | SYSTOLIC BLOOD PRESSURE: 130 MMHG | DIASTOLIC BLOOD PRESSURE: 84 MMHG | TEMPERATURE: 97.7 F

## 2020-06-20 VITALS
SYSTOLIC BLOOD PRESSURE: 128 MMHG | RESPIRATION RATE: 17 BRPM | DIASTOLIC BLOOD PRESSURE: 72 MMHG | HEART RATE: 71 BPM | TEMPERATURE: 99.14 F | OXYGEN SATURATION: 99 %

## 2020-06-20 VITALS — RESPIRATION RATE: 17 BRPM | OXYGEN SATURATION: 99 % | HEART RATE: 71 BPM

## 2020-08-26 ENCOUNTER — HOSPITAL ENCOUNTER (OUTPATIENT)
Age: 80
End: 2020-08-26
Payer: MEDICARE

## 2020-08-26 VITALS — BODY MASS INDEX: 42.7 KG/M2

## 2020-08-26 DIAGNOSIS — N18.3: Primary | ICD-10-CM

## 2020-08-26 LAB
ANION GAP: 5 (ref 5–15)
BUN SERPL-MCNC: 24 MG/DL (ref 7–18)
BUN/CREAT RATIO: 18.6 RATIO (ref 10–20)
CALCIUM SERPL-MCNC: 11 MG/DL (ref 8.5–10.1)
CARBON DIOXIDE: 26 MMOL/L (ref 21–32)
CHLORIDE: 110 MMOL/L (ref 98–107)
CREAT UR-MCNC: 80.4 MG/DL
EST GLOM FILT RATE - AFR AMER: 69 ML/MIN (ref 60–?)
GLUCOSE: 111 MG/DL (ref 74–106)
POTASSIUM: 4.6 MMOL/L (ref 3.5–5.1)
PROT UR-MCNC: 10.5 MG/DL (ref ?–11.9)
PROT/CREAT UR: 131 MG/G CRE (ref 0–200)

## 2020-08-26 PROCEDURE — 36415 COLL VENOUS BLD VENIPUNCTURE: CPT

## 2020-08-26 PROCEDURE — 82570 ASSAY OF URINE CREATININE: CPT

## 2020-08-26 PROCEDURE — 84156 ASSAY OF PROTEIN URINE: CPT

## 2020-08-26 PROCEDURE — 80048 BASIC METABOLIC PNL TOTAL CA: CPT

## 2020-10-21 ENCOUNTER — HOSPITAL ENCOUNTER (OUTPATIENT)
Age: 80
End: 2020-10-21
Payer: MEDICARE

## 2020-10-21 VITALS — BODY MASS INDEX: 42.7 KG/M2

## 2020-10-21 DIAGNOSIS — H34.232: Primary | ICD-10-CM

## 2020-10-21 DIAGNOSIS — H34.02: ICD-10-CM

## 2020-10-21 PROCEDURE — 93880 EXTRACRANIAL BILAT STUDY: CPT

## 2020-10-21 PROCEDURE — A4216 STERILE WATER/SALINE, 10 ML: HCPCS

## 2020-10-21 PROCEDURE — C8924 2D TTE W OR W/O FOL W/CON,FU: HCPCS

## 2020-10-21 PROCEDURE — 93308 TTE F-UP OR LMTD: CPT

## 2020-11-09 ENCOUNTER — HOSPITAL ENCOUNTER (OUTPATIENT)
Age: 80
End: 2020-11-09
Payer: MEDICARE

## 2020-11-09 VITALS — BODY MASS INDEX: 40.4 KG/M2

## 2020-11-09 DIAGNOSIS — I25.10: Primary | ICD-10-CM

## 2020-11-09 PROCEDURE — 93017 CV STRESS TEST TRACING ONLY: CPT

## 2020-11-09 PROCEDURE — A9500 TC99M SESTAMIBI: HCPCS

## 2020-11-09 PROCEDURE — 78452 HT MUSCLE IMAGE SPECT MULT: CPT

## 2020-11-09 PROCEDURE — A4216 STERILE WATER/SALINE, 10 ML: HCPCS

## 2021-02-22 ENCOUNTER — HOSPITAL ENCOUNTER (OUTPATIENT)
Age: 81
End: 2021-02-22
Payer: MEDICARE

## 2021-02-22 VITALS — BODY MASS INDEX: 40.4 KG/M2

## 2021-02-22 DIAGNOSIS — N18.30: Primary | ICD-10-CM

## 2021-02-22 LAB
ANION GAP: 3 (ref 5–15)
BUN SERPL-MCNC: 29 MG/DL (ref 7–18)
BUN/CREAT RATIO: 23.8 RATIO (ref 10–20)
CALCIUM SERPL-MCNC: 10.5 MG/DL (ref 8.5–10.1)
CARBON DIOXIDE: 28 MMOL/L (ref 21–32)
CHLORIDE: 107 MMOL/L (ref 98–107)
CREAT UR-MCNC: 71.4 MG/DL
DEPRECATED RDW RBC: 56.5 FL (ref 35.1–43.9)
ERYTHROCYTE [DISTWIDTH] IN BLOOD: 14.9 % (ref 11.6–14.6)
EST GLOM FILT RATE - AFR AMER: 73 ML/MIN (ref 60–?)
GLUCOSE: 106 MG/DL (ref 74–106)
HCT VFR BLD AUTO: 37.9 % (ref 40–54)
HEMOGLOBIN: 11.8 G/DL (ref 13–16.5)
HGB BLD-MCNC: 11.8 G/DL (ref 13–16.5)
MCV RBC: 102.7 FL (ref 80–94)
MEAN CORP HGB CONC: 31.1 G/DL (ref 32–36)
MEAN PLATELET VOL.: 12 FL (ref 6.2–12)
PLATELET # BLD: 89 K/MM3 (ref 150–450)
PLATELET COUNT: 89 K/MM3 (ref 150–450)
POSITIVE COUNT: YES
POTASSIUM: 4.9 MMOL/L (ref 3.5–5.1)
PROT UR-MCNC: 10.4 MG/DL (ref ?–11.9)
PROT/CREAT UR: 146 MG/G CRE (ref 0–200)
PTHIN: 101.1 PG/ML (ref 18.4–80.1)
RBC # BLD AUTO: 3.69 M/MM3 (ref 4.6–6.2)
RBC DISTRIBUTION WIDTH CV: 14.9 % (ref 11.6–14.6)
RBC DISTRIBUTION WIDTH SD: 56.5 FL (ref 35.1–43.9)
WBC # BLD AUTO: 5.4 K/MM3 (ref 4.4–11)
WHITE BLOOD COUNT: 5.4 K/MM3 (ref 4.4–11)

## 2021-02-22 PROCEDURE — 84156 ASSAY OF PROTEIN URINE: CPT

## 2021-02-22 PROCEDURE — 82570 ASSAY OF URINE CREATININE: CPT

## 2021-02-22 PROCEDURE — 36415 COLL VENOUS BLD VENIPUNCTURE: CPT

## 2021-02-22 PROCEDURE — 83970 ASSAY OF PARATHORMONE: CPT

## 2021-02-22 PROCEDURE — 85027 COMPLETE CBC AUTOMATED: CPT

## 2021-02-22 PROCEDURE — 80048 BASIC METABOLIC PNL TOTAL CA: CPT

## 2021-09-03 ENCOUNTER — HOSPITAL ENCOUNTER (OUTPATIENT)
Age: 81
End: 2021-09-03
Payer: MEDICARE

## 2021-09-03 DIAGNOSIS — N18.30: Primary | ICD-10-CM

## 2021-09-03 LAB
ANION GAP: 2 (ref 5–15)
BUN SERPL-MCNC: 29 MG/DL (ref 7–18)
BUN/CREAT RATIO: 20.9 RATIO (ref 10–20)
CALCIUM SERPL-MCNC: 10.5 MG/DL (ref 8.5–10.1)
CARBON DIOXIDE: 28 MMOL/L (ref 21–32)
CHLORIDE: 110 MMOL/L (ref 98–107)
CREAT UR-MCNC: 106 MG/DL
EST GLOM FILT RATE - AFR AMER: 63 ML/MIN (ref 60–?)
GLUCOSE: 98 MG/DL (ref 74–106)
POTASSIUM: 4.8 MMOL/L (ref 3.5–5.1)
PROT UR-MCNC: 6.8 MG/DL (ref ?–11.9)
PROT/CREAT UR: 64 MG/G CRE (ref 0–200)

## 2021-09-03 PROCEDURE — 84156 ASSAY OF PROTEIN URINE: CPT

## 2021-09-03 PROCEDURE — 82570 ASSAY OF URINE CREATININE: CPT

## 2021-09-03 PROCEDURE — 36415 COLL VENOUS BLD VENIPUNCTURE: CPT

## 2021-09-03 PROCEDURE — 80048 BASIC METABOLIC PNL TOTAL CA: CPT

## 2021-12-07 ENCOUNTER — HOSPITAL ENCOUNTER (OUTPATIENT)
Age: 81
End: 2021-12-07
Payer: MEDICARE

## 2021-12-07 DIAGNOSIS — N18.30: Primary | ICD-10-CM

## 2021-12-07 DIAGNOSIS — N25.81: ICD-10-CM

## 2021-12-07 DIAGNOSIS — D63.1: ICD-10-CM

## 2021-12-07 DIAGNOSIS — M10.9: ICD-10-CM

## 2021-12-07 LAB
ALBUMIN SERPL-MCNC: 3.6 G/DL (ref 3.2–5)
BUN SERPL-MCNC: 38 MG/DL (ref 7–18)
BUN/CREAT RATIO: 23.5 RATIO (ref 10–20)
CALCIUM SERPL-MCNC: 10.4 MG/DL (ref 8.5–10.1)
CARBON DIOXIDE: 27 MMOL/L (ref 21–32)
CHLORIDE: 107 MMOL/L (ref 98–107)
CREAT UR-MCNC: 32 MG/DL
DEPRECATED RDW RBC: 60.1 FL (ref 35.1–43.9)
DIFFERENTIAL COMMENT: (no result)
DIFFERENTIAL INDICATED: (no result)
ERYTHROCYTE [DISTWIDTH] IN BLOOD: 15.7 % (ref 11.6–14.6)
EST GLOM FILT RATE - AFR AMER: 53 ML/MIN (ref 60–?)
GLUCOSE: 114 MG/DL (ref 74–106)
HCT VFR BLD AUTO: 41.9 % (ref 40–54)
HEMOGLOBIN: 13.5 G/DL (ref 13–16.5)
HGB BLD-MCNC: 13.5 G/DL (ref 13–16.5)
IMMATURE GRANULOCYTES COUNT: 0.02 X10^3/UL (ref 0–0)
MAGNESIUM: 2 MG/DL (ref 1.6–2.6)
MANUAL DIF COMMENT BLD-IMP: (no result)
MCV RBC: 104.5 FL (ref 80–94)
MEAN CORP HGB CONC: 32.2 G/DL (ref 32–36)
MEAN PLATELET VOL.: 12.4 FL (ref 6.2–12)
NRBC FLAGGED BY ANALYZER: 0 % (ref 0–5)
PLATELET # BLD: 86 K/MM3 (ref 150–450)
PLATELET COUNT: 86 K/MM3 (ref 150–450)
POSITIVE COUNT: YES
POTASSIUM: 4.9 MMOL/L (ref 3.5–5.1)
PROT UR-MCNC: < 6 MG/DL (ref ?–11.9)
PROT/CREAT UR: (no result) MG/G CRE (ref 0–200)
PTHIN: 116.4 PG/ML (ref 18.4–80.1)
RBC # BLD AUTO: 4.01 M/MM3 (ref 4.6–6.2)
RBC DISTRIBUTION WIDTH CV: 15.7 % (ref 11.6–14.6)
RBC DISTRIBUTION WIDTH SD: 60.1 FL (ref 35.1–43.9)
SCAN SMEAR PER REVIEW CRITERIA: (no result)
URATE SERPL-MCNC: 5 MG/DL (ref 3.5–7.2)
VITAMIN D,25 HYDROXY: 32.1 NG/ML
WBC # BLD AUTO: 6.1 K/MM3 (ref 4.4–11)
WHITE BLOOD COUNT: 6.1 K/MM3 (ref 4.4–11)

## 2021-12-07 PROCEDURE — 36415 COLL VENOUS BLD VENIPUNCTURE: CPT

## 2021-12-07 PROCEDURE — 83735 ASSAY OF MAGNESIUM: CPT

## 2021-12-07 PROCEDURE — 80069 RENAL FUNCTION PANEL: CPT

## 2021-12-07 PROCEDURE — 82570 ASSAY OF URINE CREATININE: CPT

## 2021-12-07 PROCEDURE — 85025 COMPLETE CBC W/AUTO DIFF WBC: CPT

## 2021-12-07 PROCEDURE — 82652 VIT D 1 25-DIHYDROXY: CPT

## 2021-12-07 PROCEDURE — 84156 ASSAY OF PROTEIN URINE: CPT

## 2021-12-07 PROCEDURE — 83970 ASSAY OF PARATHORMONE: CPT

## 2021-12-07 PROCEDURE — 84550 ASSAY OF BLOOD/URIC ACID: CPT

## 2021-12-07 PROCEDURE — 82306 VITAMIN D 25 HYDROXY: CPT

## 2021-12-10 LAB — 1,25(OH)2D3 SERPL-MCNC: 53 PG/ML (ref 19.9–79.3)

## 2022-02-08 ENCOUNTER — HOSPITAL ENCOUNTER (OUTPATIENT)
Dept: HOSPITAL 100 - LAB | Age: 82
Discharge: HOME | End: 2022-02-08
Payer: MEDICARE

## 2022-02-08 DIAGNOSIS — E78.5: ICD-10-CM

## 2022-02-08 DIAGNOSIS — I10: Primary | ICD-10-CM

## 2022-02-08 LAB
ALANINE AMINOTRANSFER ALT/SGPT: 31 U/L (ref 16–61)
ALBUMIN SERPL-MCNC: 3.3 G/DL (ref 3.2–5)
ALKALINE PHOSPHATASE: 82 U/L (ref 45–117)
AST(SGOT): 30 U/L (ref 15–37)
BILIRUB DIRECT SERPL-MCNC: 0.19 MG/DL (ref 0–0.3)
CHOLEST SERPL-MCNC: 118 MG/DL
GLOBULIN: 3.4 G/DL (ref 2.2–4.2)
TRIGLYCERIDES: 300 MG/DL
VLDLC SERPL-MCNC: 60 MG/DL (ref 5–40)

## 2022-02-08 PROCEDURE — 36415 COLL VENOUS BLD VENIPUNCTURE: CPT

## 2022-02-08 PROCEDURE — 80076 HEPATIC FUNCTION PANEL: CPT

## 2022-02-08 PROCEDURE — 80061 LIPID PANEL: CPT

## 2022-03-03 ENCOUNTER — HOSPITAL ENCOUNTER (OUTPATIENT)
Dept: HOSPITAL 100 - LAB | Age: 82
Discharge: HOME | End: 2022-03-03
Payer: MEDICARE

## 2022-03-03 DIAGNOSIS — N18.31: Primary | ICD-10-CM

## 2022-03-03 LAB
ANION GAP: 1 (ref 5–15)
BUN SERPL-MCNC: 27 MG/DL (ref 7–18)
BUN/CREAT RATIO: 18.8 RATIO (ref 10–20)
CALCIUM SERPL-MCNC: 11.2 MG/DL (ref 8.5–10.1)
CARBON DIOXIDE: 29 MMOL/L (ref 21–32)
CHLORIDE: 110 MMOL/L (ref 98–107)
CREAT UR-MCNC: 86.4 MG/DL
EST GLOM FILT RATE - AFR AMER: 60 ML/MIN (ref 60–?)
GLUCOSE: 107 MG/DL (ref 74–106)
POTASSIUM: 5.1 MMOL/L (ref 3.5–5.1)
PROT UR-MCNC: 13.1 MG/DL (ref ?–11.9)
PROT/CREAT UR: 152 MG/G CRE (ref 0–200)

## 2022-03-03 PROCEDURE — 80048 BASIC METABOLIC PNL TOTAL CA: CPT

## 2022-03-03 PROCEDURE — 36415 COLL VENOUS BLD VENIPUNCTURE: CPT

## 2022-03-03 PROCEDURE — 84156 ASSAY OF PROTEIN URINE: CPT

## 2022-03-03 PROCEDURE — 82570 ASSAY OF URINE CREATININE: CPT

## 2022-08-15 ENCOUNTER — HOSPITAL ENCOUNTER (EMERGENCY)
Age: 82
Discharge: HOME | End: 2022-08-15
Payer: MEDICARE

## 2022-08-15 VITALS
OXYGEN SATURATION: 97 % | TEMPERATURE: 97.7 F | DIASTOLIC BLOOD PRESSURE: 68 MMHG | SYSTOLIC BLOOD PRESSURE: 129 MMHG | RESPIRATION RATE: 18 BRPM | HEART RATE: 52 BPM

## 2022-08-15 VITALS — BODY MASS INDEX: 35.7 KG/M2

## 2022-08-15 DIAGNOSIS — Z79.82: ICD-10-CM

## 2022-08-15 DIAGNOSIS — Z79.899: ICD-10-CM

## 2022-08-15 DIAGNOSIS — S20.229A: Primary | ICD-10-CM

## 2022-08-15 DIAGNOSIS — F32.A: ICD-10-CM

## 2022-08-15 DIAGNOSIS — S39.012A: ICD-10-CM

## 2022-08-15 DIAGNOSIS — I12.9: ICD-10-CM
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
## 2022-08-15 DIAGNOSIS — I25.10: ICD-10-CM

## 2022-08-15 DIAGNOSIS — E66.9: ICD-10-CM

## 2022-08-15 DIAGNOSIS — N18.30: ICD-10-CM

## 2022-08-15 DIAGNOSIS — G47.33: ICD-10-CM

## 2022-08-15 PROCEDURE — 72170 X-RAY EXAM OF PELVIS: CPT

## 2022-08-15 PROCEDURE — 99283 EMERGENCY DEPT VISIT LOW MDM: CPT

## 2022-08-24 ENCOUNTER — HOSPITAL ENCOUNTER (EMERGENCY)
Age: 82
Discharge: HOME | End: 2022-08-24
Payer: MEDICARE

## 2022-08-24 VITALS
HEART RATE: 58 BPM | SYSTOLIC BLOOD PRESSURE: 113 MMHG | OXYGEN SATURATION: 93 % | DIASTOLIC BLOOD PRESSURE: 62 MMHG | TEMPERATURE: 97.16 F | RESPIRATION RATE: 14 BRPM

## 2022-08-24 VITALS — BODY MASS INDEX: 34.7 KG/M2
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
## 2022-08-24 DIAGNOSIS — S16.1XXA: ICD-10-CM

## 2022-08-24 DIAGNOSIS — G47.33: ICD-10-CM

## 2022-08-24 DIAGNOSIS — N18.30: ICD-10-CM

## 2022-08-24 DIAGNOSIS — S09.90XA: Primary | ICD-10-CM

## 2022-08-24 DIAGNOSIS — I25.10: ICD-10-CM

## 2022-08-24 PROCEDURE — 70450 CT HEAD/BRAIN W/O DYE: CPT

## 2022-08-24 PROCEDURE — 99282 EMERGENCY DEPT VISIT SF MDM: CPT

## 2022-08-24 PROCEDURE — 72125 CT NECK SPINE W/O DYE: CPT

## 2022-10-05 ENCOUNTER — HOSPITAL ENCOUNTER (OUTPATIENT)
Dept: HOSPITAL 100 - MTLAB | Age: 82
Discharge: HOME | End: 2022-10-05
Payer: MEDICARE

## 2022-10-05 DIAGNOSIS — N18.31: Primary | ICD-10-CM

## 2022-10-05 DIAGNOSIS — E55.9: ICD-10-CM

## 2022-10-05 DIAGNOSIS — D63.1: ICD-10-CM

## 2022-10-05 LAB
ALBUMIN SERPL-MCNC: 2.9 G/DL (ref 3.2–5)
BUN SERPL-MCNC: 32 MG/DL (ref 7–18)
BUN/CREAT RATIO: 24.1 RATIO (ref 10–20)
CALCIUM SERPL-MCNC: 10.6 MG/DL (ref 8.5–10.1)
CARBON DIOXIDE: 23 MMOL/L (ref 21–32)
CHLORIDE: 114 MMOL/L (ref 98–107)
DEPRECATED RDW RBC: 62.4 FL (ref 35.1–43.9)
ERYTHROCYTE [DISTWIDTH] IN BLOOD: 15.9 % (ref 11.6–14.6)
EST GLOM FILT RATE - AFR AMER: 66 ML/MIN (ref 60–?)
GLUCOSE: 76 MG/DL (ref 74–106)
HCT VFR BLD AUTO: 29 % (ref 40–54)
HEMOGLOBIN: 9 G/DL (ref 13–16.5)
HGB BLD-MCNC: 9 G/DL (ref 13–16.5)
MAGNESIUM: 1.9 MG/DL (ref 1.6–2.6)
MCV RBC: 109 FL (ref 80–94)
MEAN CORP HGB CONC: 31 G/DL (ref 32–36)
MEAN PLATELET VOL.: 11.3 FL (ref 6.2–12)
PLATELET # BLD: 79 K/MM3 (ref 150–450)
PLATELET COUNT: 79 K/MM3 (ref 150–450)
POSITIVE COUNT: YES
POTASSIUM: 5.1 MMOL/L (ref 3.5–5.1)
PTHIN: 93.4 PG/ML (ref 18.4–80.1)
RBC # BLD AUTO: 2.66 M/MM3 (ref 4.6–6.2)
RBC DISTRIBUTION WIDTH CV: 15.9 % (ref 11.6–14.6)
RBC DISTRIBUTION WIDTH SD: 62.4 FL (ref 35.1–43.9)
VITAMIN D,25 HYDROXY: 39.7 NG/ML
WBC # BLD AUTO: 4.9 K/MM3 (ref 4.4–11)
WHITE BLOOD COUNT: 4.9 K/MM3 (ref 4.4–11)

## 2022-10-05 PROCEDURE — 83735 ASSAY OF MAGNESIUM: CPT

## 2022-10-05 PROCEDURE — 80069 RENAL FUNCTION PANEL: CPT

## 2022-10-05 PROCEDURE — 36415 COLL VENOUS BLD VENIPUNCTURE: CPT

## 2022-10-05 PROCEDURE — 82306 VITAMIN D 25 HYDROXY: CPT

## 2022-10-05 PROCEDURE — 85027 COMPLETE CBC AUTOMATED: CPT

## 2022-10-05 PROCEDURE — 84165 PROTEIN E-PHORESIS SERUM: CPT

## 2022-10-05 PROCEDURE — 83970 ASSAY OF PARATHORMONE: CPT

## 2022-10-06 ENCOUNTER — HOSPITAL ENCOUNTER (OUTPATIENT)
Dept: HOSPITAL 100 - MTLAB | Age: 82
Discharge: HOME | End: 2022-10-06
Payer: MEDICARE

## 2022-10-06 DIAGNOSIS — E55.9: ICD-10-CM

## 2022-10-06 DIAGNOSIS — N18.31: Primary | ICD-10-CM

## 2022-10-06 DIAGNOSIS — D63.1: ICD-10-CM

## 2022-10-06 LAB
ALBUMIN SERPL ELPH-MCNC: 3.1 G/DL (ref 2.9–4.4)
CREAT UR-MCNC: 47.6 MG/DL
GLOBULIN SER-MCNC: 2.3 G/DL (ref 2.2–3.9)
PROEL- A/G RATIO: 1.3 (ref 0.7–1.7)
PROEL- ALPHA-1 GLOBULIN: 0.2 G/DL (ref 0–0.4)
PROEL- ALPHA-2 GLOBULIN: 0.5 G/DL (ref 0.4–1)
PROEL- BETA GLOBULIN: 0.8 G/DL (ref 0.7–1.3)
PROEL- GAMMA GLOBULIN: 0.8 G/DL (ref 0.4–1.8)
PROEL- TOTAL PROTEIN: 5.4 G/DL (ref 6–8.5)
PROT SERPL-MCNC: 5.4 G/DL (ref 6–8.5)
PROT UR-MCNC: 28.1 MG/DL (ref ?–11.9)
PROT/CREAT UR: 590 MG/G CRE (ref 0–200)

## 2022-10-06 PROCEDURE — 84156 ASSAY OF PROTEIN URINE: CPT

## 2022-10-06 PROCEDURE — 82570 ASSAY OF URINE CREATININE: CPT

## 2022-10-06 PROCEDURE — 84166 PROTEIN E-PHORESIS/URINE/CSF: CPT

## 2022-10-10 LAB
ALPHA1 GLOB UR ELPH-MCNC: 2.7 %
B-GLOBULIN 24H MFR UR ELPH: 20.7 %
PROELU- ALBUMIN, URINE: 50 %
PROELU- ALPHA-2-GLOBULIN,UR: 11.8 %
PROELU- GAMMA GLOBULIN, UR: 14.8 %
PROT UR-MCNC: 39.5 MG/DL

## 2023-01-19 ENCOUNTER — HOSPITAL ENCOUNTER (OUTPATIENT)
Age: 83
Discharge: HOME | End: 2023-01-19
Payer: MEDICARE

## 2023-01-19 VITALS
HEART RATE: 83 BPM | RESPIRATION RATE: 18 BRPM | SYSTOLIC BLOOD PRESSURE: 130 MMHG | OXYGEN SATURATION: 97 % | DIASTOLIC BLOOD PRESSURE: 73 MMHG

## 2023-01-19 VITALS
SYSTOLIC BLOOD PRESSURE: 130 MMHG | HEART RATE: 71 BPM | OXYGEN SATURATION: 99 % | RESPIRATION RATE: 13 BRPM | DIASTOLIC BLOOD PRESSURE: 73 MMHG

## 2023-01-19 VITALS
OXYGEN SATURATION: 100 % | HEART RATE: 86 BPM | DIASTOLIC BLOOD PRESSURE: 73 MMHG | SYSTOLIC BLOOD PRESSURE: 130 MMHG | RESPIRATION RATE: 11 BRPM

## 2023-01-19 VITALS
SYSTOLIC BLOOD PRESSURE: 130 MMHG | HEART RATE: 77 BPM | TEMPERATURE: 97.7 F | RESPIRATION RATE: 19 BRPM | OXYGEN SATURATION: 100 % | DIASTOLIC BLOOD PRESSURE: 73 MMHG

## 2023-01-19 VITALS
OXYGEN SATURATION: 98 % | RESPIRATION RATE: 14 BRPM | HEART RATE: 82 BPM | SYSTOLIC BLOOD PRESSURE: 130 MMHG | DIASTOLIC BLOOD PRESSURE: 73 MMHG

## 2023-01-19 VITALS
HEART RATE: 89 BPM | RESPIRATION RATE: 16 BRPM | SYSTOLIC BLOOD PRESSURE: 127 MMHG | DIASTOLIC BLOOD PRESSURE: 86 MMHG | OXYGEN SATURATION: 88 %

## 2023-01-19 VITALS
OXYGEN SATURATION: 97 % | SYSTOLIC BLOOD PRESSURE: 130 MMHG | DIASTOLIC BLOOD PRESSURE: 73 MMHG | RESPIRATION RATE: 14 BRPM | HEART RATE: 77 BPM

## 2023-01-19 VITALS
DIASTOLIC BLOOD PRESSURE: 79 MMHG | HEART RATE: 81 BPM | RESPIRATION RATE: 12 BRPM | OXYGEN SATURATION: 98 % | SYSTOLIC BLOOD PRESSURE: 130 MMHG

## 2023-01-19 VITALS — BODY MASS INDEX: 37.4 KG/M2

## 2023-01-19 VITALS
SYSTOLIC BLOOD PRESSURE: 129 MMHG | OXYGEN SATURATION: 97 % | DIASTOLIC BLOOD PRESSURE: 73 MMHG | HEART RATE: 85 BPM | RESPIRATION RATE: 12 BRPM

## 2023-01-19 DIAGNOSIS — D47.2: Primary | ICD-10-CM

## 2023-01-19 LAB
DEPRECATED RDW RBC: 62.2 FL (ref 35.1–43.9)
DIFFERENTIAL COMMENT: (no result)
DIFFERENTIAL INDICATED: (no result)
ERYTHROCYTE [DISTWIDTH] IN BLOOD: 17.3 % (ref 11.6–14.6)
HCT VFR BLD AUTO: 33.5 % (ref 40–54)
HEMOGLOBIN: 10.3 G/DL (ref 13–16.5)
HGB BLD-MCNC: 10.3 G/DL (ref 13–16.5)
IMMATURE GRANULOCYTES COUNT: 0.01 X10^3/UL (ref 0–0)
MANUAL DIF COMMENT BLD-IMP: (no result)
MCV RBC: 98.5 FL (ref 80–94)
MEAN CORP HGB CONC: 30.7 G/DL (ref 32–36)
MEAN PLATELET VOL.: 10.2 FL (ref 6.2–12)
NRBC FLAGGED BY ANALYZER: 0 % (ref 0–5)
PLATELET # BLD: 81 K/MM3 (ref 150–450)
PLATELET COUNT: 81 K/MM3 (ref 150–450)
POSITIVE COUNT: YES
POSITIVE DIFFERENTIAL: YES
RBC # BLD AUTO: 3.4 M/MM3 (ref 4.6–6.2)
RBC DISTRIBUTION WIDTH CV: 17.3 % (ref 11.6–14.6)
RBC DISTRIBUTION WIDTH SD: 62.2 FL (ref 35.1–43.9)
SCAN SMEAR PER REVIEW CRITERIA: (no result)
WBC # BLD AUTO: 3.6 K/MM3 (ref 4.4–11)
WHITE BLOOD COUNT: 3.6 K/MM3 (ref 4.4–11)

## 2023-01-19 PROCEDURE — 88341 IMHCHEM/IMCYTCHM EA ADD ANTB: CPT

## 2023-01-19 PROCEDURE — 88305 TISSUE EXAM BY PATHOLOGIST: CPT

## 2023-01-19 PROCEDURE — 99156 MOD SED OTH PHYS/QHP 5/>YRS: CPT

## 2023-01-19 PROCEDURE — A4216 STERILE WATER/SALINE, 10 ML: HCPCS

## 2023-01-19 PROCEDURE — 88313 SPECIAL STAINS GROUP 2: CPT

## 2023-01-19 PROCEDURE — 88311 DECALCIFY TISSUE: CPT

## 2023-01-19 PROCEDURE — 85025 COMPLETE CBC W/AUTO DIFF WBC: CPT

## 2023-01-19 PROCEDURE — 36415 COLL VENOUS BLD VENIPUNCTURE: CPT

## 2023-01-19 PROCEDURE — 88342 IMHCHEM/IMCYTCHM 1ST ANTB: CPT

## 2023-01-19 PROCEDURE — 77012 CT SCAN FOR NEEDLE BIOPSY: CPT

## 2023-01-19 PROCEDURE — 38222 DX BONE MARROW BX & ASPIR: CPT

## 2023-03-21 VITALS
SYSTOLIC BLOOD PRESSURE: 126 MMHG | TEMPERATURE: 97.1 F | DIASTOLIC BLOOD PRESSURE: 66 MMHG | HEART RATE: 71 BPM | RESPIRATION RATE: 22 BRPM

## 2023-03-24 VITALS
DIASTOLIC BLOOD PRESSURE: 75 MMHG | HEART RATE: 78 BPM | RESPIRATION RATE: 18 BRPM | TEMPERATURE: 97.52 F | SYSTOLIC BLOOD PRESSURE: 165 MMHG

## 2023-03-28 VITALS
HEART RATE: 72 BPM | DIASTOLIC BLOOD PRESSURE: 73 MMHG | RESPIRATION RATE: 22 BRPM | SYSTOLIC BLOOD PRESSURE: 134 MMHG | TEMPERATURE: 96.1 F

## 2023-03-31 ENCOUNTER — HOSPITAL ENCOUNTER (OUTPATIENT)
Dept: HOSPITAL 100 - WC | Age: 83
Discharge: HOME | End: 2023-03-31
Payer: MEDICARE

## 2023-03-31 VITALS
TEMPERATURE: 96.98 F | RESPIRATION RATE: 18 BRPM | HEART RATE: 100 BPM | DIASTOLIC BLOOD PRESSURE: 78 MMHG | SYSTOLIC BLOOD PRESSURE: 149 MMHG

## 2023-03-31 DIAGNOSIS — E66.9: ICD-10-CM

## 2023-03-31 DIAGNOSIS — E78.5: ICD-10-CM

## 2023-03-31 DIAGNOSIS — Z79.899: ICD-10-CM

## 2023-03-31 DIAGNOSIS — J45.909: ICD-10-CM

## 2023-03-31 DIAGNOSIS — K74.60: ICD-10-CM

## 2023-03-31 DIAGNOSIS — G47.33: ICD-10-CM

## 2023-03-31 DIAGNOSIS — N18.30: ICD-10-CM

## 2023-03-31 DIAGNOSIS — D63.1: ICD-10-CM

## 2023-03-31 DIAGNOSIS — R60.0: Primary | ICD-10-CM

## 2023-03-31 DIAGNOSIS — M10.9: ICD-10-CM

## 2023-03-31 DIAGNOSIS — I25.10: ICD-10-CM

## 2023-03-31 DIAGNOSIS — M79.89: ICD-10-CM

## 2023-03-31 DIAGNOSIS — Q61.3: ICD-10-CM

## 2023-03-31 DIAGNOSIS — Z95.3: ICD-10-CM

## 2023-03-31 DIAGNOSIS — Z79.51: ICD-10-CM

## 2023-03-31 DIAGNOSIS — Z79.82: ICD-10-CM

## 2023-03-31 DIAGNOSIS — N39.41: ICD-10-CM

## 2023-03-31 DIAGNOSIS — G62.9: ICD-10-CM

## 2023-03-31 DIAGNOSIS — K21.9: ICD-10-CM

## 2023-03-31 DIAGNOSIS — I12.9: ICD-10-CM

## 2023-03-31 PROCEDURE — 99213 OFFICE O/P EST LOW 20 MIN: CPT

## 2023-03-31 PROCEDURE — G0463 HOSPITAL OUTPT CLINIC VISIT: HCPCS

## 2023-03-31 PROCEDURE — 29580 STRAPPING UNNA BOOT: CPT

## 2023-04-01 VITALS
HEART RATE: 100 BPM | DIASTOLIC BLOOD PRESSURE: 78 MMHG | SYSTOLIC BLOOD PRESSURE: 149 MMHG | TEMPERATURE: 97 F | RESPIRATION RATE: 18 BRPM

## 2023-04-04 ENCOUNTER — HOSPITAL ENCOUNTER (OUTPATIENT)
Dept: HOSPITAL 100 - WC | Age: 83
LOS: 1 days | Discharge: HOME | End: 2023-04-05
Payer: MEDICARE

## 2023-04-04 VITALS
RESPIRATION RATE: 16 BRPM | SYSTOLIC BLOOD PRESSURE: 119 MMHG | DIASTOLIC BLOOD PRESSURE: 83 MMHG | TEMPERATURE: 96.7 F | HEART RATE: 84 BPM

## 2023-04-04 DIAGNOSIS — Z79.82: ICD-10-CM

## 2023-04-04 DIAGNOSIS — Z79.899: ICD-10-CM

## 2023-04-04 DIAGNOSIS — K21.9: ICD-10-CM

## 2023-04-04 DIAGNOSIS — R60.0: Primary | ICD-10-CM

## 2023-04-04 DIAGNOSIS — K74.60: ICD-10-CM

## 2023-04-04 DIAGNOSIS — G47.33: ICD-10-CM

## 2023-04-04 DIAGNOSIS — E66.9: ICD-10-CM

## 2023-04-04 DIAGNOSIS — E78.5: ICD-10-CM

## 2023-04-04 DIAGNOSIS — I25.10: ICD-10-CM

## 2023-04-04 DIAGNOSIS — N18.30: ICD-10-CM

## 2023-04-04 DIAGNOSIS — D63.1: ICD-10-CM

## 2023-04-04 DIAGNOSIS — I12.9: ICD-10-CM

## 2023-04-04 DIAGNOSIS — N39.41: ICD-10-CM

## 2023-04-04 DIAGNOSIS — M10.9: ICD-10-CM

## 2023-04-04 DIAGNOSIS — Q61.3: ICD-10-CM

## 2023-04-04 DIAGNOSIS — Z95.3: ICD-10-CM

## 2023-04-04 DIAGNOSIS — J45.909: ICD-10-CM

## 2023-04-04 PROCEDURE — 99213 OFFICE O/P EST LOW 20 MIN: CPT

## 2023-04-04 PROCEDURE — G0463 HOSPITAL OUTPT CLINIC VISIT: HCPCS

## 2023-05-28 ENCOUNTER — HOSPITAL ENCOUNTER (EMERGENCY)
Age: 83
Discharge: HOME | End: 2023-05-28
Payer: MEDICARE

## 2023-05-28 VITALS
HEART RATE: 68 BPM | OXYGEN SATURATION: 100 % | RESPIRATION RATE: 16 BRPM | SYSTOLIC BLOOD PRESSURE: 141 MMHG | TEMPERATURE: 97.8 F | DIASTOLIC BLOOD PRESSURE: 78 MMHG

## 2023-05-28 VITALS
OXYGEN SATURATION: 99 % | DIASTOLIC BLOOD PRESSURE: 107 MMHG | SYSTOLIC BLOOD PRESSURE: 145 MMHG | TEMPERATURE: 97.9 F | HEART RATE: 110 BPM | RESPIRATION RATE: 24 BRPM

## 2023-05-28 DIAGNOSIS — N18.30: ICD-10-CM

## 2023-05-28 DIAGNOSIS — I69.354: ICD-10-CM

## 2023-05-28 DIAGNOSIS — G47.33: ICD-10-CM

## 2023-05-28 DIAGNOSIS — I25.10: ICD-10-CM

## 2023-05-28 DIAGNOSIS — Z86.010: ICD-10-CM

## 2023-05-28 DIAGNOSIS — M54.9: Primary | ICD-10-CM

## 2023-05-28 PROCEDURE — 72100 X-RAY EXAM L-S SPINE 2/3 VWS: CPT

## 2023-05-28 PROCEDURE — 73502 X-RAY EXAM HIP UNI 2-3 VIEWS: CPT

## 2023-05-28 PROCEDURE — 99283 EMERGENCY DEPT VISIT LOW MDM: CPT

## 2023-07-15 ENCOUNTER — HOSPITAL ENCOUNTER (OUTPATIENT)
Dept: HOSPITAL 100 - ED | Age: 83
Setting detail: OBSERVATION
LOS: 2 days | Discharge: HOME HEALTH | End: 2023-07-17
Payer: MEDICARE

## 2023-07-15 VITALS — BODY MASS INDEX: 33.9 KG/M2 | BODY MASS INDEX: 33.5 KG/M2 | BODY MASS INDEX: 34.7 KG/M2 | BODY MASS INDEX: 32.3 KG/M2

## 2023-07-15 VITALS
RESPIRATION RATE: 13 BRPM | OXYGEN SATURATION: 100 % | SYSTOLIC BLOOD PRESSURE: 116 MMHG | HEART RATE: 100 BPM | DIASTOLIC BLOOD PRESSURE: 70 MMHG

## 2023-07-15 VITALS — SYSTOLIC BLOOD PRESSURE: 114 MMHG | DIASTOLIC BLOOD PRESSURE: 87 MMHG

## 2023-07-15 VITALS
SYSTOLIC BLOOD PRESSURE: 128 MMHG | RESPIRATION RATE: 16 BRPM | HEART RATE: 138 BPM | OXYGEN SATURATION: 98 % | DIASTOLIC BLOOD PRESSURE: 95 MMHG

## 2023-07-15 VITALS
TEMPERATURE: 97.8 F | HEART RATE: 142 BPM | RESPIRATION RATE: 17 BRPM | OXYGEN SATURATION: 96 % | DIASTOLIC BLOOD PRESSURE: 77 MMHG | SYSTOLIC BLOOD PRESSURE: 97 MMHG

## 2023-07-15 VITALS
SYSTOLIC BLOOD PRESSURE: 112 MMHG | DIASTOLIC BLOOD PRESSURE: 67 MMHG | OXYGEN SATURATION: 97 % | RESPIRATION RATE: 18 BRPM | TEMPERATURE: 97.88 F | HEART RATE: 96 BPM

## 2023-07-15 DIAGNOSIS — E78.5: ICD-10-CM

## 2023-07-15 DIAGNOSIS — Z95.3: ICD-10-CM

## 2023-07-15 DIAGNOSIS — D63.1: ICD-10-CM

## 2023-07-15 DIAGNOSIS — E66.9: ICD-10-CM

## 2023-07-15 DIAGNOSIS — I50.30: ICD-10-CM

## 2023-07-15 DIAGNOSIS — Z79.899: ICD-10-CM

## 2023-07-15 DIAGNOSIS — K75.81: ICD-10-CM

## 2023-07-15 DIAGNOSIS — K21.9: ICD-10-CM

## 2023-07-15 DIAGNOSIS — Z79.82: ICD-10-CM

## 2023-07-15 DIAGNOSIS — D50.9: ICD-10-CM

## 2023-07-15 DIAGNOSIS — Z91.81: ICD-10-CM

## 2023-07-15 DIAGNOSIS — D69.6: ICD-10-CM

## 2023-07-15 DIAGNOSIS — D53.9: ICD-10-CM

## 2023-07-15 DIAGNOSIS — I08.1: ICD-10-CM

## 2023-07-15 DIAGNOSIS — I25.10: ICD-10-CM

## 2023-07-15 DIAGNOSIS — G47.33: ICD-10-CM

## 2023-07-15 DIAGNOSIS — E83.42: ICD-10-CM

## 2023-07-15 DIAGNOSIS — N18.31: ICD-10-CM

## 2023-07-15 DIAGNOSIS — I48.0: Primary | ICD-10-CM

## 2023-07-15 DIAGNOSIS — I48.92: ICD-10-CM

## 2023-07-15 DIAGNOSIS — D72.810: ICD-10-CM

## 2023-07-15 DIAGNOSIS — M10.9: ICD-10-CM

## 2023-07-15 DIAGNOSIS — J45.20: ICD-10-CM

## 2023-07-15 DIAGNOSIS — I13.0: ICD-10-CM

## 2023-07-15 DIAGNOSIS — I45.10: ICD-10-CM

## 2023-07-15 DIAGNOSIS — F41.8: ICD-10-CM

## 2023-07-15 LAB
ANION GAP: 6 (ref 5–15)
BUN SERPL-MCNC: 28 MG/DL (ref 7–18)
BUN/CREAT RATIO: 34.4 RATIO (ref 10–20)
CALCIUM SERPL-MCNC: 8.1 MG/DL (ref 8.5–10.1)
CARBON DIOXIDE: 19 MMOL/L (ref 21–32)
CHLORIDE: 120 MMOL/L (ref 98–107)
DEPRECATED RDW RBC: 67.1 FL (ref 35.1–43.9)
DIFFERENTIAL INDICATED: (no result)
ERYTHROCYTE [DISTWIDTH] IN BLOOD: 17.3 % (ref 11.6–14.6)
EST GLOM FILT RATE - AFR AMER: 116 ML/MIN (ref 60–?)
ESTIMATED CREATININE CLEARANCE: 71.35 ML/MIN
GLUCOSE: 70 MG/DL (ref 74–106)
HCT VFR BLD AUTO: 33.1 % (ref 40–54)
HEMOGLOBIN: 10.4 G/DL (ref 13–16.5)
HGB BLD-MCNC: 10.4 G/DL (ref 13–16.5)
IMMATURE GRANULOCYTES COUNT: 0.01 X10^3/UL (ref 0–0)
MAGNESIUM: 1.3 MG/DL (ref 1.6–2.6)
MCV RBC: 105.8 FL (ref 80–94)
MEAN CORP HGB CONC: 31.4 G/DL (ref 32–36)
MEAN PLATELET VOL.: 12 FL (ref 6.2–12)
NRBC FLAGGED BY ANALYZER: 0 % (ref 0–5)
PLATELET # BLD: 69 K/MM3 (ref 150–450)
PLATELET COUNT: 69 K/MM3 (ref 150–450)
POSITIVE COUNT: YES
POSITIVE DIFFERENTIAL: YES
POSITIVE MORPHOLOGY: YES
POTASSIUM: 3.9 MMOL/L (ref 3.5–5.1)
RBC # BLD AUTO: 3.13 M/MM3 (ref 4.6–6.2)
RBC DISTRIBUTION WIDTH CV: 17.3 % (ref 11.6–14.6)
RBC DISTRIBUTION WIDTH SD: 67.1 FL (ref 35.1–43.9)
SCAN SMEAR PER REVIEW CRITERIA: (no result)
TROPONIN-I HS: 12 PG/ML (ref 3–78)
WBC # BLD AUTO: 5.2 K/MM3 (ref 4.4–11)
WHITE BLOOD COUNT: 5.2 K/MM3 (ref 4.4–11)

## 2023-07-15 PROCEDURE — 84484 ASSAY OF TROPONIN QUANT: CPT

## 2023-07-15 PROCEDURE — 93005 ELECTROCARDIOGRAM TRACING: CPT

## 2023-07-15 PROCEDURE — 36415 COLL VENOUS BLD VENIPUNCTURE: CPT

## 2023-07-15 PROCEDURE — 99221 1ST HOSP IP/OBS SF/LOW 40: CPT

## 2023-07-15 PROCEDURE — 85025 COMPLETE CBC W/AUTO DIFF WBC: CPT

## 2023-07-15 PROCEDURE — 80053 COMPREHEN METABOLIC PANEL: CPT

## 2023-07-15 PROCEDURE — 93306 TTE W/DOPPLER COMPLETE: CPT

## 2023-07-15 PROCEDURE — 99285 EMERGENCY DEPT VISIT HI MDM: CPT

## 2023-07-15 PROCEDURE — 84100 ASSAY OF PHOSPHORUS: CPT

## 2023-07-15 PROCEDURE — 83735 ASSAY OF MAGNESIUM: CPT

## 2023-07-15 PROCEDURE — C8929 TTE W OR WO FOL WCON,DOPPLER: HCPCS

## 2023-07-15 PROCEDURE — 96367 TX/PROPH/DG ADDL SEQ IV INF: CPT

## 2023-07-15 PROCEDURE — 71045 X-RAY EXAM CHEST 1 VIEW: CPT

## 2023-07-15 PROCEDURE — 96365 THER/PROPH/DIAG IV INF INIT: CPT

## 2023-07-15 PROCEDURE — 84443 ASSAY THYROID STIM HORMONE: CPT

## 2023-07-15 PROCEDURE — A4216 STERILE WATER/SALINE, 10 ML: HCPCS

## 2023-07-15 PROCEDURE — 97802 MEDICAL NUTRITION INDIV IN: CPT

## 2023-07-15 PROCEDURE — 94640 AIRWAY INHALATION TREATMENT: CPT

## 2023-07-15 PROCEDURE — 97162 PT EVAL MOD COMPLEX 30 MIN: CPT

## 2023-07-15 PROCEDURE — 80048 BASIC METABOLIC PNL TOTAL CA: CPT

## 2023-07-15 PROCEDURE — G0378 HOSPITAL OBSERVATION PER HR: HCPCS

## 2023-07-15 PROCEDURE — 96366 THER/PROPH/DIAG IV INF ADDON: CPT

## 2023-07-15 RX ADMIN — SODIUM CHLORIDE 999 ML: 9 INJECTION, SOLUTION INTRAVENOUS at 19:50

## 2023-07-16 VITALS
OXYGEN SATURATION: 97 % | TEMPERATURE: 98.24 F | HEART RATE: 104 BPM | RESPIRATION RATE: 18 BRPM | SYSTOLIC BLOOD PRESSURE: 117 MMHG | DIASTOLIC BLOOD PRESSURE: 94 MMHG

## 2023-07-16 VITALS
SYSTOLIC BLOOD PRESSURE: 114 MMHG | DIASTOLIC BLOOD PRESSURE: 72 MMHG | TEMPERATURE: 97.7 F | HEART RATE: 70 BPM | RESPIRATION RATE: 18 BRPM | OXYGEN SATURATION: 98 %

## 2023-07-16 VITALS
HEART RATE: 138 BPM | OXYGEN SATURATION: 98 % | SYSTOLIC BLOOD PRESSURE: 108 MMHG | RESPIRATION RATE: 16 BRPM | DIASTOLIC BLOOD PRESSURE: 80 MMHG | TEMPERATURE: 98.4 F

## 2023-07-16 VITALS — OXYGEN SATURATION: 89 %

## 2023-07-16 VITALS
RESPIRATION RATE: 16 BRPM | OXYGEN SATURATION: 98 % | SYSTOLIC BLOOD PRESSURE: 120 MMHG | TEMPERATURE: 98.4 F | HEART RATE: 67 BPM | DIASTOLIC BLOOD PRESSURE: 72 MMHG

## 2023-07-16 VITALS — DIASTOLIC BLOOD PRESSURE: 80 MMHG | HEART RATE: 138 BPM | SYSTOLIC BLOOD PRESSURE: 108 MMHG

## 2023-07-16 VITALS — HEART RATE: 70 BPM | SYSTOLIC BLOOD PRESSURE: 114 MMHG | DIASTOLIC BLOOD PRESSURE: 72 MMHG

## 2023-07-16 VITALS — HEART RATE: 71 BPM | OXYGEN SATURATION: 97 % | RESPIRATION RATE: 18 BRPM

## 2023-07-16 VITALS
OXYGEN SATURATION: 93 % | RESPIRATION RATE: 18 BRPM | TEMPERATURE: 98.06 F | SYSTOLIC BLOOD PRESSURE: 102 MMHG | DIASTOLIC BLOOD PRESSURE: 64 MMHG | HEART RATE: 95 BPM

## 2023-07-16 VITALS — OXYGEN SATURATION: 96 % | HEART RATE: 82 BPM | RESPIRATION RATE: 16 BRPM

## 2023-07-16 VITALS
RESPIRATION RATE: 16 BRPM | OXYGEN SATURATION: 94 % | SYSTOLIC BLOOD PRESSURE: 106 MMHG | HEART RATE: 70 BPM | TEMPERATURE: 97.88 F | DIASTOLIC BLOOD PRESSURE: 73 MMHG

## 2023-07-16 VITALS — OXYGEN SATURATION: 88 %

## 2023-07-16 VITALS — OXYGEN SATURATION: 93 %

## 2023-07-16 LAB
ALANINE AMINOTRANSFER ALT/SGPT: 37 U/L (ref 16–61)
ALBUMIN SERPL-MCNC: 2.6 G/DL (ref 3.2–5)
ALKALINE PHOSPHATASE: 191 U/L (ref 45–117)
ANION GAP: 3 (ref 5–15)
ANISOCYTOSIS BLD QL SMEAR: (no result)
ANISOCYTOSIS: (no result)
AST(SGOT): 53 U/L (ref 15–37)
BUN SERPL-MCNC: 32 MG/DL (ref 7–18)
BUN/CREAT RATIO: 31.7 RATIO (ref 10–20)
CALCIUM SERPL-MCNC: 10 MG/DL (ref 8.5–10.1)
CARBON DIOXIDE: 24 MMOL/L (ref 21–32)
CHLORIDE: 114 MMOL/L (ref 98–107)
DEPRECATED RDW RBC: 68.4 FL (ref 35.1–43.9)
DIFFERENTIAL COMMENT: (no result)
DIFFERENTIAL INDICATED: (no result)
ERYTHROCYTE [DISTWIDTH] IN BLOOD: 17.4 % (ref 11.6–14.6)
EST GLOM FILT RATE - AFR AMER: 91 ML/MIN (ref 60–?)
ESTIMATED CREATININE CLEARANCE: 57.22 ML/MIN
GLOBULIN: 3.2 G/DL (ref 2.2–4.2)
GLUCOSE: 111 MG/DL (ref 74–106)
HCT VFR BLD AUTO: 30.1 % (ref 40–54)
HEMOGLOBIN: 9.7 G/DL (ref 13–16.5)
HGB BLD-MCNC: 9.7 G/DL (ref 13–16.5)
IMMATURE GRANULOCYTES COUNT: 0 X10^3/UL (ref 0–0)
MACROCYTES BLD QL: (no result)
MACROCYTOSIS: (no result)
MAGNESIUM: 1.9 MG/DL (ref 1.6–2.6)
MANUAL DIF COMMENT BLD-IMP: (no result)
MCV RBC: 106 FL (ref 80–94)
MEAN CORP HGB CONC: 32.2 G/DL (ref 32–36)
MEAN PLATELET VOL.: 11.5 FL (ref 6.2–12)
NRBC FLAGGED BY ANALYZER: 0 % (ref 0–5)
PLATELET # BLD: 56 K/MM3 (ref 150–450)
PLATELET COUNT: 56 K/MM3 (ref 150–450)
POSITIVE COUNT: YES
POSITIVE DIFFERENTIAL: YES
POSITIVE MORPHOLOGY: YES
POTASSIUM: 4.3 MMOL/L (ref 3.5–5.1)
RBC # BLD AUTO: 2.84 M/MM3 (ref 4.6–6.2)
RBC DISTRIBUTION WIDTH CV: 17.4 % (ref 11.6–14.6)
RBC DISTRIBUTION WIDTH SD: 68.4 FL (ref 35.1–43.9)
SCAN SMEAR PER REVIEW CRITERIA: (no result)
TROPONIN-I HS: 21 PG/ML (ref 3–78)
TROPONIN-I HS: 26 PG/ML (ref 3–78)
WBC # BLD AUTO: 3.1 K/MM3 (ref 4.4–11)
WHITE BLOOD COUNT: 3.1 K/MM3 (ref 4.4–11)

## 2023-07-16 RX ADMIN — TOLTERODINE TARTRATE 4 MG: 4 CAPSULE, EXTENDED RELEASE ORAL at 09:09

## 2023-07-16 RX ADMIN — SODIUM CHLORIDE, PRESERVATIVE FREE 0 ML: 5 INJECTION INTRAVENOUS at 09:13

## 2023-07-16 RX ADMIN — FLUTICASONE PROPIONATE 1 SPRAY: 50 SPRAY, METERED NASAL at 09:09

## 2023-07-16 RX ADMIN — ASPIRIN 81 MG: 81 TABLET, COATED ORAL at 09:36

## 2023-07-16 RX ADMIN — Medication 128 MG: at 09:09

## 2023-07-16 RX ADMIN — BUDESONIDE 0.5 MG: 0.5 SUSPENSION RESPIRATORY (INHALATION) at 07:23

## 2023-07-16 RX ADMIN — BUDESONIDE 0.5 MG: 0.5 SUSPENSION RESPIRATORY (INHALATION) at 20:25

## 2023-07-17 VITALS
RESPIRATION RATE: 16 BRPM | SYSTOLIC BLOOD PRESSURE: 122 MMHG | TEMPERATURE: 97.4 F | HEART RATE: 69 BPM | OXYGEN SATURATION: 95 % | DIASTOLIC BLOOD PRESSURE: 90 MMHG

## 2023-07-17 VITALS
OXYGEN SATURATION: 95 % | SYSTOLIC BLOOD PRESSURE: 109 MMHG | DIASTOLIC BLOOD PRESSURE: 66 MMHG | RESPIRATION RATE: 18 BRPM | HEART RATE: 68 BPM | TEMPERATURE: 97.52 F

## 2023-07-17 VITALS
OXYGEN SATURATION: 98 % | DIASTOLIC BLOOD PRESSURE: 69 MMHG | SYSTOLIC BLOOD PRESSURE: 106 MMHG | TEMPERATURE: 98.24 F | RESPIRATION RATE: 14 BRPM | HEART RATE: 71 BPM

## 2023-07-17 VITALS — HEART RATE: 69 BPM | SYSTOLIC BLOOD PRESSURE: 122 MMHG | DIASTOLIC BLOOD PRESSURE: 90 MMHG

## 2023-07-17 VITALS
RESPIRATION RATE: 16 BRPM | HEART RATE: 73 BPM | TEMPERATURE: 97.1 F | DIASTOLIC BLOOD PRESSURE: 60 MMHG | SYSTOLIC BLOOD PRESSURE: 89 MMHG | OXYGEN SATURATION: 95 %

## 2023-07-17 VITALS — OXYGEN SATURATION: 93 % | HEART RATE: 75 BPM | RESPIRATION RATE: 18 BRPM

## 2023-07-17 VITALS — OXYGEN SATURATION: 95 %

## 2023-07-17 LAB
ALANINE AMINOTRANSFER ALT/SGPT: 33 U/L (ref 16–61)
ALBUMIN SERPL-MCNC: 2.6 G/DL (ref 3.2–5)
ALKALINE PHOSPHATASE: 182 U/L (ref 45–117)
ANION GAP: 1 (ref 5–15)
ANISOCYTOSIS BLD QL SMEAR: (no result)
ANISOCYTOSIS: (no result)
AST(SGOT): 44 U/L (ref 15–37)
BUN SERPL-MCNC: 31 MG/DL (ref 7–18)
BUN/CREAT RATIO: 30.4 RATIO (ref 10–20)
CALCIUM SERPL-MCNC: 10.4 MG/DL (ref 8.5–10.1)
CARBON DIOXIDE: 25 MMOL/L (ref 21–32)
CHLORIDE: 114 MMOL/L (ref 98–107)
DEPRECATED RDW RBC: 68.6 FL (ref 35.1–43.9)
DIFFERENTIAL COMMENT: (no result)
DIFFERENTIAL INDICATED: (no result)
ERYTHROCYTE [DISTWIDTH] IN BLOOD: 17.3 % (ref 11.6–14.6)
EST GLOM FILT RATE - AFR AMER: 90 ML/MIN (ref 60–?)
ESTIMATED CREATININE CLEARANCE: 56.66 ML/MIN
GLOBULIN: 3.1 G/DL (ref 2.2–4.2)
GLUCOSE: 97 MG/DL (ref 74–106)
HCT VFR BLD AUTO: 33.1 % (ref 40–54)
HEMOGLOBIN: 10.3 G/DL (ref 13–16.5)
HGB BLD-MCNC: 10.3 G/DL (ref 13–16.5)
IMMATURE GRANULOCYTES COUNT: 0.02 X10^3/UL (ref 0–0)
MAGNESIUM: 1.7 MG/DL (ref 1.6–2.6)
MANUAL DIF COMMENT BLD-IMP: (no result)
MCV RBC: 106.4 FL (ref 80–94)
MEAN CORP HGB CONC: 31.1 G/DL (ref 32–36)
MEAN PLATELET VOL.: 11.8 FL (ref 6.2–12)
NRBC FLAGGED BY ANALYZER: 0 % (ref 0–5)
PLATELET # BLD: 67 K/MM3 (ref 150–450)
PLATELET COUNT: 67 K/MM3 (ref 150–450)
POSITIVE COUNT: YES
POSITIVE DIFFERENTIAL: YES
POSITIVE MORPHOLOGY: YES
POTASSIUM: 4.6 MMOL/L (ref 3.5–5.1)
RBC # BLD AUTO: 3.11 M/MM3 (ref 4.6–6.2)
RBC DISTRIBUTION WIDTH CV: 17.3 % (ref 11.6–14.6)
RBC DISTRIBUTION WIDTH SD: 68.6 FL (ref 35.1–43.9)
SCAN SMEAR PER REVIEW CRITERIA: (no result)
WBC # BLD AUTO: 5.2 K/MM3 (ref 4.4–11)
WHITE BLOOD COUNT: 5.2 K/MM3 (ref 4.4–11)

## 2023-07-17 RX ADMIN — Medication 128 MG: at 08:49

## 2023-07-17 RX ADMIN — FLUTICASONE PROPIONATE 1 SPRAY: 50 SPRAY, METERED NASAL at 08:49

## 2023-07-17 RX ADMIN — BUDESONIDE 0.5 MG: 0.5 SUSPENSION RESPIRATORY (INHALATION) at 07:17

## 2023-07-17 RX ADMIN — SODIUM CHLORIDE, PRESERVATIVE FREE 0 ML: 5 INJECTION INTRAVENOUS at 08:50

## 2023-07-17 RX ADMIN — TOLTERODINE TARTRATE 4 MG: 4 CAPSULE, EXTENDED RELEASE ORAL at 08:49

## 2023-07-17 RX ADMIN — ASPIRIN 81 MG: 81 TABLET, COATED ORAL at 08:49

## 2023-08-22 ENCOUNTER — HOSPITAL ENCOUNTER (OUTPATIENT)
Age: 83
Discharge: HOME | End: 2023-08-22
Payer: MEDICARE

## 2023-08-22 DIAGNOSIS — I48.0: Primary | ICD-10-CM

## 2023-08-22 PROCEDURE — 93226 XTRNL ECG REC<48 HR SCAN A/R: CPT

## 2023-08-22 PROCEDURE — 93225 XTRNL ECG REC<48 HRS REC: CPT

## 2023-08-23 ENCOUNTER — HOSPITAL ENCOUNTER (EMERGENCY)
Age: 83
Discharge: HOME | End: 2023-08-23
Payer: MEDICARE

## 2023-08-23 VITALS — RESPIRATION RATE: 18 BRPM | HEART RATE: 93 BPM | DIASTOLIC BLOOD PRESSURE: 71 MMHG | SYSTOLIC BLOOD PRESSURE: 138 MMHG

## 2023-08-23 VITALS
RESPIRATION RATE: 15 BRPM | HEART RATE: 126 BPM | TEMPERATURE: 97.7 F | SYSTOLIC BLOOD PRESSURE: 124 MMHG | OXYGEN SATURATION: 98 % | DIASTOLIC BLOOD PRESSURE: 99 MMHG

## 2023-08-23 VITALS — HEART RATE: 75 BPM | DIASTOLIC BLOOD PRESSURE: 67 MMHG | SYSTOLIC BLOOD PRESSURE: 102 MMHG

## 2023-08-23 VITALS — HEART RATE: 129 BPM | SYSTOLIC BLOOD PRESSURE: 136 MMHG | DIASTOLIC BLOOD PRESSURE: 92 MMHG

## 2023-08-23 VITALS — RESPIRATION RATE: 15 BRPM | HEART RATE: 93 BPM | DIASTOLIC BLOOD PRESSURE: 71 MMHG | SYSTOLIC BLOOD PRESSURE: 138 MMHG

## 2023-08-23 VITALS
DIASTOLIC BLOOD PRESSURE: 75 MMHG | SYSTOLIC BLOOD PRESSURE: 136 MMHG | OXYGEN SATURATION: 98 % | HEART RATE: 101 BPM | RESPIRATION RATE: 15 BRPM

## 2023-08-23 VITALS — BODY MASS INDEX: 34.7 KG/M2

## 2023-08-23 VITALS — DIASTOLIC BLOOD PRESSURE: 68 MMHG | SYSTOLIC BLOOD PRESSURE: 129 MMHG | HEART RATE: 91 BPM

## 2023-08-23 DIAGNOSIS — N18.30: ICD-10-CM

## 2023-08-23 DIAGNOSIS — I48.91: Primary | ICD-10-CM

## 2023-08-23 DIAGNOSIS — I25.10: ICD-10-CM

## 2023-08-23 LAB
ALANINE AMINOTRANSFER ALT/SGPT: 40 U/L (ref 16–61)
ALBUMIN SERPL-MCNC: 2.8 G/DL (ref 3.2–5)
ALKALINE PHOSPHATASE: 173 U/L (ref 45–117)
ANION GAP: 4 (ref 5–15)
AST(SGOT): 47 U/L (ref 15–37)
BILIRUB DIRECT SERPL-MCNC: 0.19 MG/DL (ref 0–0.3)
BNP,B-TYPE NATRIURETIC PEPTIDE: 216.2 PG/ML (ref 0–100)
BUN SERPL-MCNC: 32 MG/DL (ref 7–18)
BUN/CREAT RATIO: 28.1 RATIO (ref 10–20)
CALCIUM SERPL-MCNC: 10.5 MG/DL (ref 8.5–10.1)
CARBON DIOXIDE: 24 MMOL/L (ref 21–32)
CARDIAC TROPONIN I PNL SERPL HS: 19 PG/ML (ref 3–78)
CHLORIDE: 112 MMOL/L (ref 98–107)
D-DIMER QUANTITATIVE (DVT/PE): 1.86 FEU/UG/M (ref 0.27–0.49)
DEPRECATED RDW RBC: 62.1 FL (ref 35.1–43.9)
DIFFERENTIAL COMMENT: (no result)
DIFFERENTIAL INDICATED: (no result)
ERYTHROCYTE [DISTWIDTH] IN BLOOD: 15.9 % (ref 11.6–14.6)
EST GLOM FILT RATE - AFR AMER: 79 ML/MIN (ref 60–?)
ESTIMATED CREATININE CLEARANCE: 50.69 ML/MIN
GLOBULIN: 3.1 G/DL (ref 2.2–4.2)
GLUCOSE: 103 MG/DL (ref 74–106)
HCT VFR BLD AUTO: 30.1 % (ref 40–54)
HEMOGLOBIN: 9.6 G/DL (ref 13–16.5)
HGB BLD-MCNC: 9.6 G/DL (ref 13–16.5)
IMMATURE GRANULOCYTES COUNT: 0 X10^3/UL (ref 0–0)
MANUAL DIF COMMENT BLD-IMP: (no result)
MCV RBC: 106.7 FL (ref 80–94)
MEAN CORP HGB CONC: 31.9 G/DL (ref 32–36)
MEAN PLATELET VOL.: 11.4 FL (ref 6.2–12)
NRBC FLAGGED BY ANALYZER: 0 % (ref 0–5)
PLATELET # BLD: 61 K/MM3 (ref 150–450)
PLATELET COUNT: 61 K/MM3 (ref 150–450)
POSITIVE COUNT: YES
POSITIVE DIFFERENTIAL: YES
POTASSIUM: 4 MMOL/L (ref 3.5–5.1)
RBC # BLD AUTO: 2.82 M/MM3 (ref 4.6–6.2)
RBC DISTRIBUTION WIDTH CV: 15.9 % (ref 11.6–14.6)
RBC DISTRIBUTION WIDTH SD: 62.1 FL (ref 35.1–43.9)
SCAN SMEAR PER REVIEW CRITERIA: (no result)
TROPONIN-I HS: 21 PG/ML (ref 3–78)
WBC # BLD AUTO: 3.6 K/MM3 (ref 4.4–11)
WHITE BLOOD COUNT: 3.6 K/MM3 (ref 4.4–11)

## 2023-08-23 PROCEDURE — 99285 EMERGENCY DEPT VISIT HI MDM: CPT

## 2023-08-23 PROCEDURE — 71275 CT ANGIOGRAPHY CHEST: CPT

## 2023-08-23 PROCEDURE — 93005 ELECTROCARDIOGRAM TRACING: CPT

## 2023-08-23 PROCEDURE — 83880 ASSAY OF NATRIURETIC PEPTIDE: CPT

## 2023-08-23 PROCEDURE — 80076 HEPATIC FUNCTION PANEL: CPT

## 2023-08-23 PROCEDURE — 71045 X-RAY EXAM CHEST 1 VIEW: CPT

## 2023-08-23 PROCEDURE — A4216 STERILE WATER/SALINE, 10 ML: HCPCS

## 2023-08-23 PROCEDURE — 84484 ASSAY OF TROPONIN QUANT: CPT

## 2023-08-23 PROCEDURE — 85379 FIBRIN DEGRADATION QUANT: CPT

## 2023-08-23 PROCEDURE — 80048 BASIC METABOLIC PNL TOTAL CA: CPT

## 2023-08-23 PROCEDURE — 85025 COMPLETE CBC W/AUTO DIFF WBC: CPT

## 2023-09-28 ENCOUNTER — HOSPITAL ENCOUNTER (OUTPATIENT)
Dept: HOSPITAL 100 - WC | Age: 83
LOS: 2 days | Discharge: HOME | End: 2023-09-30
Payer: MEDICARE

## 2023-09-28 VITALS — DIASTOLIC BLOOD PRESSURE: 75 MMHG | RESPIRATION RATE: 18 BRPM | SYSTOLIC BLOOD PRESSURE: 125 MMHG | HEART RATE: 76 BPM

## 2023-09-28 VITALS — BODY MASS INDEX: 34.5 KG/M2

## 2023-09-28 DIAGNOSIS — I48.0: ICD-10-CM

## 2023-09-28 DIAGNOSIS — G62.9: ICD-10-CM

## 2023-09-28 DIAGNOSIS — N18.30: ICD-10-CM

## 2023-09-28 DIAGNOSIS — Z79.82: ICD-10-CM

## 2023-09-28 DIAGNOSIS — I25.10: ICD-10-CM

## 2023-09-28 DIAGNOSIS — M79.89: ICD-10-CM

## 2023-09-28 DIAGNOSIS — M10.9: ICD-10-CM

## 2023-09-28 DIAGNOSIS — I87.2: Primary | ICD-10-CM

## 2023-09-28 DIAGNOSIS — D63.1: ICD-10-CM

## 2023-09-28 DIAGNOSIS — E66.9: ICD-10-CM

## 2023-09-28 DIAGNOSIS — Z79.899: ICD-10-CM

## 2023-09-28 DIAGNOSIS — R60.0: ICD-10-CM

## 2023-09-28 DIAGNOSIS — L97.221: ICD-10-CM

## 2023-09-28 DIAGNOSIS — I12.9: ICD-10-CM

## 2023-09-28 DIAGNOSIS — E78.5: ICD-10-CM

## 2023-09-28 PROCEDURE — 29581 APPL MULTLAYER CMPRN SYS LEG: CPT

## 2023-09-28 PROCEDURE — G0463 HOSPITAL OUTPT CLINIC VISIT: HCPCS

## 2023-09-28 PROCEDURE — 99213 OFFICE O/P EST LOW 20 MIN: CPT

## 2023-10-01 VITALS — HEART RATE: 76 BPM | SYSTOLIC BLOOD PRESSURE: 125 MMHG | RESPIRATION RATE: 18 BRPM | DIASTOLIC BLOOD PRESSURE: 75 MMHG

## 2023-10-05 ENCOUNTER — HOSPITAL ENCOUNTER (OUTPATIENT)
Dept: HOSPITAL 100 - WC | Age: 83
Discharge: HOME | End: 2023-10-05
Payer: MEDICARE

## 2023-10-05 VITALS — SYSTOLIC BLOOD PRESSURE: 134 MMHG | RESPIRATION RATE: 22 BRPM | DIASTOLIC BLOOD PRESSURE: 63 MMHG | HEART RATE: 120 BPM

## 2023-10-05 VITALS — BODY MASS INDEX: 34.5 KG/M2

## 2023-10-05 DIAGNOSIS — I87.2: ICD-10-CM

## 2023-10-05 DIAGNOSIS — G62.9: ICD-10-CM

## 2023-10-05 DIAGNOSIS — Z09: Primary | ICD-10-CM

## 2023-10-05 DIAGNOSIS — R60.0: ICD-10-CM

## 2023-10-05 PROCEDURE — 99213 OFFICE O/P EST LOW 20 MIN: CPT

## 2023-10-05 PROCEDURE — G0463 HOSPITAL OUTPT CLINIC VISIT: HCPCS

## 2023-12-21 ENCOUNTER — HOSPITAL ENCOUNTER (INPATIENT)
Dept: HOSPITAL 100 - ED | Age: 83
LOS: 5 days | Discharge: NURSING, REHAB OR LONG-TERM CARE | DRG: 194 | End: 2023-12-26
Payer: MEDICARE

## 2023-12-21 VITALS
SYSTOLIC BLOOD PRESSURE: 111 MMHG | TEMPERATURE: 98.2 F | HEART RATE: 118 BPM | RESPIRATION RATE: 22 BRPM | DIASTOLIC BLOOD PRESSURE: 73 MMHG | OXYGEN SATURATION: 95 %

## 2023-12-21 VITALS
DIASTOLIC BLOOD PRESSURE: 56 MMHG | HEART RATE: 124 BPM | RESPIRATION RATE: 27 BRPM | OXYGEN SATURATION: 97 % | SYSTOLIC BLOOD PRESSURE: 96 MMHG

## 2023-12-21 VITALS
RESPIRATION RATE: 18 BRPM | SYSTOLIC BLOOD PRESSURE: 127 MMHG | TEMPERATURE: 98.24 F | OXYGEN SATURATION: 93 % | HEART RATE: 130 BPM | DIASTOLIC BLOOD PRESSURE: 92 MMHG

## 2023-12-21 VITALS
TEMPERATURE: 98.2 F | HEART RATE: 130 BPM | OXYGEN SATURATION: 91 % | DIASTOLIC BLOOD PRESSURE: 92 MMHG | SYSTOLIC BLOOD PRESSURE: 127 MMHG | RESPIRATION RATE: 18 BRPM

## 2023-12-21 VITALS — BODY MASS INDEX: 38.3 KG/M2 | BODY MASS INDEX: 38.2 KG/M2

## 2023-12-21 VITALS — OXYGEN SATURATION: 91 %

## 2023-12-21 DIAGNOSIS — I35.0: ICD-10-CM

## 2023-12-21 DIAGNOSIS — Z79.899: ICD-10-CM

## 2023-12-21 DIAGNOSIS — D63.1: ICD-10-CM

## 2023-12-21 DIAGNOSIS — R29.6: ICD-10-CM

## 2023-12-21 DIAGNOSIS — Z85.05: ICD-10-CM

## 2023-12-21 DIAGNOSIS — K75.81: ICD-10-CM

## 2023-12-21 DIAGNOSIS — F41.9: ICD-10-CM

## 2023-12-21 DIAGNOSIS — E66.01: ICD-10-CM

## 2023-12-21 DIAGNOSIS — E78.5: ICD-10-CM

## 2023-12-21 DIAGNOSIS — F32.A: ICD-10-CM

## 2023-12-21 DIAGNOSIS — J18.9: Primary | ICD-10-CM

## 2023-12-21 DIAGNOSIS — R09.02: ICD-10-CM

## 2023-12-21 DIAGNOSIS — Z86.73: ICD-10-CM

## 2023-12-21 DIAGNOSIS — R18.8: ICD-10-CM

## 2023-12-21 DIAGNOSIS — Z66: ICD-10-CM

## 2023-12-21 DIAGNOSIS — I12.9: ICD-10-CM

## 2023-12-21 DIAGNOSIS — Z95.2: ICD-10-CM

## 2023-12-21 DIAGNOSIS — I48.0: ICD-10-CM

## 2023-12-21 DIAGNOSIS — M1A.9XX0: ICD-10-CM

## 2023-12-21 DIAGNOSIS — L03.115: ICD-10-CM

## 2023-12-21 DIAGNOSIS — K21.9: ICD-10-CM

## 2023-12-21 DIAGNOSIS — Z79.82: ICD-10-CM

## 2023-12-21 DIAGNOSIS — I25.10: ICD-10-CM

## 2023-12-21 DIAGNOSIS — N18.30: ICD-10-CM

## 2023-12-21 LAB
ANION GAP: 5 (ref 5–15)
BNP,B-TYPE NATRIURETIC PEPTIDE: 270.4 PG/ML (ref 0–100)
BUN SERPL-MCNC: 22 MG/DL (ref 7–18)
BUN/CREAT RATIO: 24.9 RATIO (ref 10–20)
CALCIUM SERPL-MCNC: 11.2 MG/DL (ref 8.5–10.1)
CARBON DIOXIDE: 27 MMOL/L (ref 21–32)
CARDIAC TROPONIN I PNL SERPL HS: 24 PG/ML (ref 3–78)
CHLORIDE: 110 MMOL/L (ref 98–107)
D-DIMER QUANTITATIVE (DVT/PE): 3.03 FEU/UG/M (ref 0.27–0.49)
DEPRECATED RDW RBC: 56.5 FL (ref 35.1–43.9)
DIFFERENTIAL INDICATED: (no result)
ERYTHROCYTE [DISTWIDTH] IN BLOOD: 15.1 % (ref 11.6–14.6)
EST GLOM FILT RATE - AFR AMER: 106 ML/MIN (ref 60–?)
ESTIMATED CREATININE CLEARANCE: 65.67 ML/MIN
GLUCOSE: 96 MG/DL (ref 74–106)
HCT VFR BLD AUTO: 43.3 % (ref 40–54)
HGB BLD-MCNC: 14.3 G/DL (ref 13–16.5)
IMMATURE GRANULOCYTES COUNT: 0.03 X10^3/UL (ref 0–0)
MCV RBC: 101.6 FL (ref 80–94)
MEAN CORP HGB CONC: 33 G/DL (ref 32–36)
MEAN PLATELET VOL.: 10.7 FL (ref 6.2–12)
NRBC FLAGGED BY ANALYZER: 0 % (ref 0–5)
PLATELET # BLD: 131 K/MM3 (ref 150–450)
POSITIVE DIFFERENTIAL: YES
POTASSIUM: 4.4 MMOL/L (ref 3.5–5.1)
RBC # BLD AUTO: 4.26 M/MM3 (ref 4.6–6.2)
SCAN SMEAR PER REVIEW CRITERIA: (no result)
WBC # BLD AUTO: 11.4 K/MM3 (ref 4.4–11)

## 2023-12-21 PROCEDURE — 99284 EMERGENCY DEPT VISIT MOD MDM: CPT

## 2023-12-21 PROCEDURE — 97166 OT EVAL MOD COMPLEX 45 MIN: CPT

## 2023-12-21 PROCEDURE — 93970 EXTREMITY STUDY: CPT

## 2023-12-21 PROCEDURE — 83690 ASSAY OF LIPASE: CPT

## 2023-12-21 PROCEDURE — 83880 ASSAY OF NATRIURETIC PEPTIDE: CPT

## 2023-12-21 PROCEDURE — 93005 ELECTROCARDIOGRAM TRACING: CPT

## 2023-12-21 PROCEDURE — 85025 COMPLETE CBC W/AUTO DIFF WBC: CPT

## 2023-12-21 PROCEDURE — 71045 X-RAY EXAM CHEST 1 VIEW: CPT

## 2023-12-21 PROCEDURE — 84484 ASSAY OF TROPONIN QUANT: CPT

## 2023-12-21 PROCEDURE — 97530 THERAPEUTIC ACTIVITIES: CPT

## 2023-12-21 PROCEDURE — 87426 SARSCOV CORONAVIRUS AG IA: CPT

## 2023-12-21 PROCEDURE — A4216 STERILE WATER/SALINE, 10 ML: HCPCS

## 2023-12-21 PROCEDURE — 82652 VIT D 1 25-DIHYDROXY: CPT

## 2023-12-21 PROCEDURE — 71275 CT ANGIOGRAPHY CHEST: CPT

## 2023-12-21 PROCEDURE — 94640 AIRWAY INHALATION TREATMENT: CPT

## 2023-12-21 PROCEDURE — 85379 FIBRIN DEGRADATION QUANT: CPT

## 2023-12-21 PROCEDURE — 36415 COLL VENOUS BLD VENIPUNCTURE: CPT

## 2023-12-21 PROCEDURE — 80076 HEPATIC FUNCTION PANEL: CPT

## 2023-12-21 PROCEDURE — 97535 SELF CARE MNGMENT TRAINING: CPT

## 2023-12-21 PROCEDURE — 80048 BASIC METABOLIC PNL TOTAL CA: CPT

## 2023-12-21 PROCEDURE — 83970 ASSAY OF PARATHORMONE: CPT

## 2023-12-21 PROCEDURE — 80053 COMPREHEN METABOLIC PANEL: CPT

## 2023-12-21 PROCEDURE — 97116 GAIT TRAINING THERAPY: CPT

## 2023-12-21 PROCEDURE — 97162 PT EVAL MOD COMPLEX 30 MIN: CPT

## 2023-12-21 RX ADMIN — SODIUM CHLORIDE 999 ML: 9 INJECTION, SOLUTION INTRAVENOUS at 23:36

## 2023-12-21 RX ADMIN — SODIUM CHLORIDE 1000 ML: 9 INJECTION, SOLUTION INTRAVENOUS at 22:15

## 2023-12-21 NOTE — RAD_ITS
REPORT-ID:[id number] 
 
EXAM:  XR CHEST, 1 VIEW 
 
CLINICAL INDICATION:  chest pain 
 
TECHNIQUE:  Frontal view of the chest. 
 
COMPARISON:  8/23/2023 
 
FINDINGS: 
 
LUNGS AND PLEURAL SPACES:  There is bilateral lower lobe airspace disease. 
No pneumothorax.  No effusion. 
 
HEART:  Unremarkable.  Cardiac silhouette not enlarged. 
 
MEDIASTINUM:  Central airways and mediastinal contour are unremarkable. 
 
BONES/JOINTS:  Unremarkable.  No acute fracture. 
 
SOFT TISSUES:  Unremarkable. 
 
ORDER #: [id number] RAD/Chest 1 View (Portable)  
IMPRESSION:  
 
  
Bilateral lower lobe airspace disease which may represent pneumonia.  
 
  
Electronically Signed:  
Amari King MD  
2023/12/21 at 22:36 EST  
Reading Location ID and State: 1414 / [place]  
Tel [phone], Service support  [phone], Fax [phone]

## 2023-12-21 NOTE — ED.VIS.DYS
HPI
History of Present Illness
Chief Complaint: Shortness of Breath
Narrative
Narrative: 
83-year-old male with history of A-fib, CAD, CAREN, obesity presenting with 2 weeks of worsening dyspnea.  This is worse with exertion.  Patient states that he is okay when he sitting.  He is able to sleep at night flat on his side without orthopnea.  
No fevers or chills.  He states [facility] but could not get in the van.  He states that he was unable to get a ride and does not have any family however at this point in the interview his 
daughter walked in and states that she could have taken him.  States that he is stubborn and does not want help.  He is weak and when she went to pick him up he could not even walk.

[facility]
Medical History (Reviewed 12/22/23 @ 00:02 by Dr. Ritchie Fernando, DO)

Anemia
Anemia in chronic kidney disease
Anemia of chronic disease
Aortic valve disorder
Asthma
Body mass index (BMI) 40.0-44.9, adult
Chronic acquired lymphedema
CKD (chronic kidney disease), stage III
Depression
Essential (primary) hypertension
GERD (gastroesophageal reflux disease)
Gout
History of cerebrovascular accident
History of splenomegaly
Hyperlipemia
Liver cancer
Morbid obesity
Multiple lung nodules
New onset atrial flutter
Non-alcoholic cirrhosis
Nonobstructive atherosclerosis of coronary artery
Nonrheumatic aortic (valve) stenosis
OAB (overactive bladder)
Obesity
Obesity (BMI 30-39.9)
CAREN treated with BiPAP
Osteoarthritis
PCK (polycystic kidney disease)
Peripheral neuropathy
Polycystic kidney disease
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
Right bundle branch block (RBBB)
Splenomegaly
Thrombocytopenia
Thrombocytopenia
Urge incontinence


Home Medications

allopurinol 300 mg tablet 300 mg PO DAILY gout 12/27/16 [History Last Taken 12/22/19]
fenofibrate nanocrystallized 48 mg tablet 48 mg PO DAILY cholesterol 12/27/16 [History Last Taken 12/22/19]
magnesium oxide 400 mg (241.3 mg magnesium) tablet 400 mg PO DAILY supplement 12/27/16 [History Last Taken 12/22/19]
multivit-min-folic acid 0.4 mg-lycopene 300 mcg-lutein 250 mcg tablet 1 ea PO DAILY vitamin 12/27/16 [History Last Taken 12/22/19]
sertraline 100 mg tablet 50 mg PO QHS depression 05/21/20 [History Last Taken Unknown]
aspirin 81 mg tablet,delayed release (Adult Aspirin Regimen) 81 mg PO DAILY 07/27/21 [History Last Taken Unknown]
ferrous sulfate 325 mg (65 mg iron) tablet 325 mg PO BID 07/27/21 [History Last Taken Unknown]
albuterol sulfate 90 mcg/actuation aerosol inhaler 2 puff inhalation Q6H PRN ASTHMA 02/08/22 [History Last Taken Unknown]
fluticasone propionate 50 mcg/actuation nasal spray,suspension 1 spray intranasal DAILY 02/08/22 [History Last Taken Unknown]
omeprazole 40 mg capsule,delayed release 40 mg PO DAILY 02/08/22 [History Last Taken Unknown]
oxybutynin chloride 15 mg tablet,extended release 24 hr 30 mg PO DAILY 02/08/22 [History Last Taken Unknown]
ascorbic acid (vitamin C) 500 mg capsule 500 mg PO DAILY 01/31/23 [History Last Taken Unknown]
furosemide 20 mg tablet 20 mg PO Q OTHER DAY 01/31/23 [History Last Taken Unknown]
nitroglycerin 0.4 mg sublingual tablet 0.4 mg sublingual Q5M PRN chest pain 01/31/23 [History Last Taken Unknown]
lidocaine 5 % topical patch (Lidoderm) 1 patch topical DAILY #15 ea 05/28/23 [Rx Last Taken Unknown]
cyanocobalamin (vitamin B-12) 5,000 mcg capsule 2,500 mcg PO DAILY 07/15/23 [History Last Taken Unknown]
rifaximin 550 mg tablet (Xifaxan) 550 mg PO BID 07/15/23 [History Last Taken Unknown]
diltiazem HCl 120 mg capsule,extended release 24 hr 120 mg PO DAILY #30 caps 08/23/23 [Rx Last Taken Unknown]
metoprolol tartrate 25 mg tablet 25 mg PO BID #60 tabs 08/23/23 [Rx Last Taken Unknown]
fluticasone furoate 200 mcg-vilanterol 25 mcg/dose inhalation powder (Breo Ellipta) 1 inh inhalation DAILY 12/21/23 [History Last Taken Unknown]




Allergy/AdvReac Type Severity Reaction Status Date / Time
hydrocodone [From Vicodin] Allergy  Other Verified 12/21/23 21:38
strawberry Allergy  Hives Verified 12/21/23 21:38
venom-honey bee Allergy  Anaphylaxis Verified 12/21/23 21:38
[bee venom (honey bee)]     
adhesive tape AdvReac  Rash Verified 07/15/23 19:14
atorvastatin AdvReac  PT UNSURE Verified 12/21/23 21:38
   OF REACTION  
montelukast AdvReac  PT UNSURE Verified 12/21/23 21:38
   OF REACTION  


Family History (Reviewed 12/22/23 @ 00:02 by Dr. Ritchie Fernando, DO)
Father
 CAD (coronary artery disease)
Brother
 Diabetes
Mother
 Heart disease


Surgical History (Reviewed 12/22/23 @ 00:02 by Dr. Ritchie Fernando, DO)

Aortic valve replaced
History of aortic valve replacement with bioprosthetic valve (10/04/11)
History of hip surgery (05/2020)
History of knee replacement
History of left heart catheterization (09/21/11)
History of tonsillectomy
History of total left hip arthroplasty
History of total left hip replacement
History of umbilical hernia repair


Social History (Reviewed 12/22/23 @ 00:02 by Dr. Ritchie Fernando, DO)
household members:  none 
Smoking Status:  Never smoker 
alcohol intake:  never 
substance use type:  does not use 



ROS
ROS ED
Constitutional
Constitutional ED: Denies chills, fever(s) or sweats
Eyes
Eyes: Denies blurry vision or change in vision
ENT
ENT ED: Denies ear pain or sore throat
Cardiovascular
Cardiovascular: Denies chest pain, palpitations or racing heartbeat
Respiratory/Chest
Respiratory/Chest: Reports cough and dyspnea; Denies sputum
Gastrointestinal
Gastrointestinal: Denies abdominal pain, constipation, diarrhea, nausea or vomiting
Genitourinary
Genitourinary ED: Denies dysuria, hematuria or urinary frequency
Musculoskeletal
Musculoskeletal: Denies arthralgias, myalgias or neck pain
Integumentary
Denies abscess, Abrasions or rash
Neurologic
Neurologic: Denies headache(s), paresthesias or weakness
Psychiatric
Psychiatric: Denies anxiety, depression, suicidal ideation or suicidal thoughts
Endocrine
Endocrinology: Denies polydipsia or polyuria

EXAM
Physical Exam
Const
Vital Signs: 



 12/21/23
21:39 12/21/23
21:44 12/21/23
22:06
Temperature 98.2 F 98.2 F 
Temperature Source Oral Oral 
Pulse Rate 130 H 130 H 
Respiratory Rate 18 18 
Blood Pressure 127/92 H 127/92 H 
Blood Pressure Mean 103 103 
Pulse Ox 91 93 91
Oxygen Delivery Method Room Air Room Air Room Air
Oxygen Flow Rate (L/min)   

 12/21/23
22:55 12/21/23
23:37
Temperature  98.2 F
Temperature Source  Oral
Pulse Rate 124 H 118 H
Respiratory Rate 27 H 22 H
Blood Pressure 96/56 L 111/73
Blood Pressure Mean 69 85
Pulse Ox 97 95
Oxygen Delivery Method Nasal Cannula Nasal Cannula
Oxygen Flow Rate (L/min) 2 2



Positive well nourished and well developed
General Appearance ED: well developed
HEENT
Reports dry mucous membranes
Mouth ED: Yes dry mucous membranes
Mouth: dry mucous membranes
Eyes
PERRL and EOMs intact bilaterally
General Eye ED: Negative for pale conjunctiva
Neck
no lymphadenopathy, supple and no meningeal signs
Resp
Auscultation: diminished lung sounds bilateral lower
Cardio
Rate: tachycardic
Rhythm: abnormal rhythm irregularly irregular
GI
non-tender
Extremity
Extremity Narrative: 
Erythema noted to the right tibial region.  There is increased warmth in this area
General Extremety ED: Yes edema
General Extremity: edema
Neuro
oriented x3 and CN's II-XII intact bilaterally
Sensorium / Orientation: alert
Psych
mental status grossly normal
Skin
Skin Narrative: 
As described above

MDM
MDM
MDM Narrative
Medical decision making narrative: 
83-year-old male presenting with generalized weakness, cough, dyspnea.  Hypoxic on room air.  He is placed on 2 L of nasal cannula.  Differential includes ACS, CHF, pneumonia, COVID, influenza, dehydration, electro abnormalities, A-fib with RVR.  
CBC was obtained to assess white blood cell count, hemoglobin, platelets.  BMP to assess renal function, electrolytes.  High-sensitivity troponin and EKG were obtained to assess for ischemia/dysrhythmia.  BNP to assess for CHF.  Chest x-ray was 
obtained to assess for CHF or pneumonia.  CBC shows white blood cell count 4.  Today blood pressure 1.3.  Platelets 131 for low.  Creatinine normal 1.88.  Electrolytes unremarkable.  High-sensitivity troponin is 24.  EKG initially looked like sinus 
tachycardia however on the monitor in the room he appears to be in A-fib.  Patient given Cardizem 20 mg IV and given a liter normal saline.  His initial blood pressure was 127/92 and this did drop into the 90s systolically after medication was 
given.  His heart rate came down from 130 to 115?120.  Patient states he feels well.  BNP mildly elevated to 70.4.  Chest x-ray my interpretation shows bilateral lower lobe pneumonia.  Patient given Rocephin and azithromycin.  Patient's blood 
pressure now stable at 111/73.  Patient's pulse ox 95% on 2 L nasal cannula.  Heart rate still 115-120.  At this point I will give the patient has a liter of IV fluids.  Discussed with the hospitalist for admission.  I did add a D-dimer to his 
workup to assess for blood clot as the patient is at risk with hypoxia and being generally weak and bedridden for the last 2 weeks.  D-dimer came back at 3.03.  Will obtain a CTA of the chest.  Patient will be admitted and this was followed.

Impression:
1.  Bilateral pneumonia
2.  A-fib with RVR
3.  Dehydration
4.  Elevated D-dimer
Lab Data
Attestation: I reviewed the patient's lab results.
Labs: 

Laboratory Results - last 24 hr

  12/21/23
  21:56
WBC  11.4 H
RBC  4.26 L
Hgb  14.3
Hct  43.3
MCV  101.6 H
MCH  33.6 H
MCHC  33.0
RDW Std Deviation  56.5 H
RDW Coeff of Ilia  15.1 H
Plt Count  131 L
MPV  10.7
Immature Gran % (Auto)  0.300
Neut % (Auto)  93.2 H
Lymph % (Auto)  1.8 L
Mono % (Auto)  4.1
Eos % (Auto)  0.2
Baso % (Auto)  0.4
Absolute Neuts (auto)  10.6 H
Absolute Lymphs (auto)  0.21 L
Nucleated RBC %  0
Differential Comment  
D-Dimer Quant (PE/DVT)  3.03 H*
Sodium  142
Potassium  4.4
Chloride  110 H
Carbon Dioxide  27.0
Anion Gap  5
BUN  22 H
Creatinine  0.88
Estim Creat Clear Calc  65.67
Est GFR (MDRD) Af Amer  106
Est GFR (MDRD) Non-Af  87
BUN/Creatinine Ratio  24.9 H
Glucose  96
Calcium  11.2 H
Troponin I High Sens  24
B-Natriuretic Peptide  270.4 H



Radiography
Diagnostic Testing: 

Clinical Impression(s) from Imaging Studies

Chest X-Ray  12/21/23 22:07
IMPRESSION:
 
Bilateral lower lobe airspace disease which may represent pneumonia.
 
Electronically Signed:
Amari King MD
2023/12/21 at 22:36 EST
Reading Location ID and State: [id number] / [place]
Tel [phone], Service support  [phone], Fax [phone]
 





Discharge Plan
Triage
Chief Complaint: Shortness of Breath

ED Provider: Ritchie Fernando

Dx/Rx/DC Orders
Primary Care Provider: Júnior Ahuja

## 2023-12-21 NOTE — PCM.HP.STD
[facility] - General
General
Date of Admission: 12/22/23
Date of Service: 12/21/23
Chief Complaint: Shortness of breath
HPI Narrative
NENA MADRIGAL, is a 83 M with a past medical history of essential hypertension, hyperlipidemia; with intolerance to statins, obesity; with BMI of 38.4 this admission, history of nonalcoholic fatty liver disease; with splenomegaly and cirrhosis, 
obstructive sleep apnea; treated with BiPAP, history of asthma, history of paroxysmal atrial fibrillation; apparently not currently on anticoagulation, history of aortic stenosis; status post bioprosthetic aortic valve replacement (2011), history of 
CVA, history of liver cancer, history of retinal artery occlusion (2020), history of chronic thrombocytopenia, anemia of chronic disease, chronic kidney disease; stage III, history of polycystic kidney disease, chronic lower extremity lymphedema; 
previously followed by [facility] that he has not seen for months, depression, gout, GERD followed by the [facility] who presents to [facility] ER complaining of shortness of breath.  Mr. Madrigal reports his symptoms began 
approximately 2 weeks prior to admission with dyspnea on exertion that progressed to shortness of breath at rest.  He also admits to a nonproductive cough.  He states that he was initially supposed to go to the [facility] position 
approximately 2-1/2 weeks ago but could not get in the van.  He stated that he did not have a ride-but he does have a daughter who is actively involved in his care.  His daughter states that he is very stubborn and does not want help.  In addition 
to this she also states that he is generally weak and when she went to pick him up he could not even walk.  He denies associated fever, chills, nausea, vomiting and he states that he is able to sleep flat at night without shortness of breath.  In 
the ER he was noted to have bilateral lower lobe infiltrates consistent with suspected community-acquired pneumonia complicated by clinical evidence of acute hypoxic respiratory insufficiency and right lower extremity cellulitis he was then admitted 
to the PCU for ongoing care for stay that is expected to be greater than 48 hours.


[facility]
Medical History (Reviewed 12/22/23 @ 00:20 by Dr. Joseph Li DO)

Anemia
Anemia in chronic kidney disease
Anemia of chronic disease
Aortic valve disorder
Asthma
Body mass index (BMI) 40.0-44.9, adult
Chronic acquired lymphedema
CKD (chronic kidney disease), stage III
Depression
Essential (primary) hypertension
GERD (gastroesophageal reflux disease)
Gout
History of cerebrovascular accident
History of splenomegaly
Hyperlipemia
Liver cancer
Morbid obesity
Multiple lung nodules
New onset atrial flutter
Non-alcoholic cirrhosis
Nonobstructive atherosclerosis of coronary artery
Nonrheumatic aortic (valve) stenosis
OAB (overactive bladder)
Obesity
Obesity (BMI 30-39.9)
CAREN treated with BiPAP
Osteoarthritis
PCK (polycystic kidney disease)
Peripheral neuropathy
Polycystic kidney disease
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
Right bundle branch block (RBBB)
Splenomegaly
Thrombocytopenia
Thrombocytopenia
Urge incontinence


Home Medications

allopurinol 300 mg tablet 300 mg PO DAILY gout 12/27/16 [History Last Taken 12/22/19]
fenofibrate nanocrystallized 48 mg tablet 48 mg PO DAILY cholesterol 12/27/16 [History Last Taken 12/22/19]
magnesium oxide 400 mg (241.3 mg magnesium) tablet 400 mg PO DAILY supplement 12/27/16 [History Last Taken 12/22/19]
multivit-min-folic acid 0.4 mg-lycopene 300 mcg-lutein 250 mcg tablet 1 ea PO DAILY vitamin 12/27/16 [History Last Taken 12/22/19]
sertraline 100 mg tablet 50 mg PO QHS depression 05/21/20 [History Last Taken Unknown]
aspirin 81 mg tablet,delayed release (Adult Aspirin Regimen) 81 mg PO DAILY 07/27/21 [History Last Taken Unknown]
ferrous sulfate 325 mg (65 mg iron) tablet 325 mg PO BID 07/27/21 [History Last Taken Unknown]
albuterol sulfate 90 mcg/actuation aerosol inhaler 2 puff inhalation Q6H PRN ASTHMA 02/08/22 [History Last Taken Unknown]
fluticasone propionate 50 mcg/actuation nasal spray,suspension 1 spray intranasal DAILY 02/08/22 [History Last Taken Unknown]
omeprazole 40 mg capsule,delayed release 40 mg PO DAILY 02/08/22 [History Last Taken Unknown]
oxybutynin chloride 15 mg tablet,extended release 24 hr 30 mg PO DAILY 02/08/22 [History Last Taken Unknown]
ascorbic acid (vitamin C) 500 mg capsule 500 mg PO DAILY 01/31/23 [History Last Taken Unknown]
furosemide 20 mg tablet 20 mg PO Q OTHER DAY 01/31/23 [History Last Taken Unknown]
nitroglycerin 0.4 mg sublingual tablet 0.4 mg sublingual Q5M PRN chest pain 01/31/23 [History Last Taken Unknown]
lidocaine 5 % topical patch (Lidoderm) 1 patch topical DAILY #15 ea 05/28/23 [Rx Last Taken Unknown]
cyanocobalamin (vitamin B-12) 5,000 mcg capsule 2,500 mcg PO DAILY 07/15/23 [History Last Taken Unknown]
rifaximin 550 mg tablet (Xifaxan) 550 mg PO BID 07/15/23 [History Last Taken Unknown]
diltiazem HCl 120 mg capsule,extended release 24 hr 120 mg PO DAILY #30 caps 08/23/23 [Rx Last Taken Unknown]
metoprolol tartrate 25 mg tablet 25 mg PO BID #60 tabs 08/23/23 [Rx Last Taken Unknown]
fluticasone furoate 200 mcg-vilanterol 25 mcg/dose inhalation powder (Breo Ellipta) 1 inh inhalation DAILY 12/21/23 [History Last Taken Unknown]




Allergy/AdvReac Type Severity Reaction Status Date / Time
hydrocodone [From Vicodin] Allergy  Other Verified 12/21/23 21:38
strawberry Allergy  Hives Verified 12/21/23 21:38
venom-honey bee Allergy  Anaphylaxis Verified 12/21/23 21:38
[bee venom (honey bee)]     
adhesive tape AdvReac  Rash Verified 07/15/23 19:14
atorvastatin AdvReac  PT UNSURE Verified 12/21/23 21:38
   OF REACTION  
montelukast AdvReac  PT UNSURE Verified 12/21/23 21:38
   OF REACTION  


Family History (Reviewed 12/22/23 @ 00:20 by Dr. Joseph Li DO)
Father
 CAD (coronary artery disease)
Brother
 Diabetes
Mother
 Heart disease


Surgical History (Reviewed 12/22/23 @ 00:20 by Dr. Joseph Li DO)

Aortic valve replaced
History of aortic valve replacement with bioprosthetic valve (10/04/11)
History of hip surgery (05/2020)
History of knee replacement
History of left heart catheterization (09/21/11)
History of tonsillectomy
History of total left hip arthroplasty
History of total left hip replacement
History of umbilical hernia repair


Social History (Reviewed 12/22/23 @ 00:20 by Dr. Joseph Li DO)
household members:  none 
Smoking Status:  Never smoker 
alcohol intake:  never 
substance use type:  does not use 



ROS
ROS Narrative
Review of systems:

Constitutional: Patient denies fevers, chills or night sweats.
Eyes: Patient denies changes in vision or discharge from eyes.
ENT: Patient denies ear pain, runny nose or sore throat.
Cardiovascular: Patient denies chest pain, chest pressure or palpitations.
Respiratory: Patient admits to dyspnea on exertion that progressed to shortness of breath at rest with a nonproductive cough.
Gastrointestinal: Patient denies abdominal pain, constipation diarrhea, nausea or vomiting.
Genitourinary: Patient denies dysuria, hematuria or urinary frequency.
Musculoskeletal: Patient admits to generalized weakness with chronic ambulatory dysfunction as outlined above.  Patient denies arthralgias or myalgias.
Integumentary: Patient admits to chronic lower extremity lymphedema with acute redness and swelling of his right anterior shin area with obvious cellulitis.
Neurologic: Patient denies headache, paresthesias, weakness or focal neurologic deficits.
Psychiatric: Patient denies significant anxiety or depression.
Endocrine: Patient denies polyuria, polydipsia or polyphagia.
Allergic: Patient denies lip swelling, tongue swelling or urticaria.
Hematologic: Patient denies easy bleeding or easy bruisability.


14 point review systems otherwise negative save for positives noted above in HPI.

Vital Signs
Vital Signs
Vital Signs: 


 12/21/23
21:39 12/21/23
21:44 12/21/23
22:06
Temperature 98.2 F 98.2 F 
Temperature Source Oral Oral 
Pulse Rate 130 H 130 H 
Respiratory Rate 18 18 
Blood Pressure 127/92 H 127/92 H 
Blood Pressure Mean 103 103 
Pulse Ox 91 93 91
Oxygen Delivery Method Room Air Room Air Room Air
Oxygen Flow Rate (L/min)   

 12/21/23
22:55
Temperature 
Temperature Source 
Pulse Rate 124 H
Respiratory Rate 27 H
Blood Pressure 96/56 L
Blood Pressure Mean 69
Pulse Ox 97
Oxygen Delivery Method Nasal Cannula
Oxygen Flow Rate (L/min) 2

Weight

Weight:                        267 lb 6.731 oz                                   
Body Mass Index (BMI)          38.3                                              




Physical Exam
Const
alert, oriented x3 and no apparent distress
Constitutional Narrative: 
Patient is very obese and appears chronically ill.
General Appearance: cooperative
HEENT
normocephalic, head/scalp atraumatic and hearing grossly normal bilaterally
HEENT Narrative: 
Oropharynx dry.
Eyes
PERRL and EOMs intact bilaterally
Neck
no lymphadenopathy and supple
Resp
Resp Narrative: 
Diminished breath sounds throughout.
Cardio
Cardio Narrative: 
Irregularly irregular at approximately 120 bpm.
GI
normal to inspection, nondistended, normoactive bowel sounds, soft to palpation, non-tender and non-distended
GI Narrative: 
Obese.
Extremity
Extremity Narrative: 
Patient has chronic lower extremity swelling with erythema and increased warmth in the right tibial region.
Skin
Skin Narrative: 
Patient has chronic lower extremity swelling with erythema and increased warmth in the right anterior tibial region.
Neuro
oriented x3, CN's II-XII intact bilaterally, moves all extremities and no focal motor deficits
Sensorium / Orientation: awake, alert, oriented to person, oriented to place and oriented to time
Speech: speech normal
Psych
affect normal

Results
Medical Records Data
Attestation: I reviewed the patient's medical records
Lab / Micro Data
Attestation: I reviewed the patient's lab results.

12/22/23 03:30          

12/22/23 03:30          

Labs: 
Laboratory Results - last 24 hr

12/21/23 21:56: WBC 11.4 H, RBC 4.26 L, Hgb 14.3, Hct 43.3, .6 H, MCH 33.6 H, MCHC 33.0, RDW Std Deviation 56.5 H, RDW Coeff of Ilia 15.1 H, Plt Count 131 L, MPV 10.7, Immature Gran % (Auto) 0.300, Neut % (Auto) 93.2 H, Lymph % (Auto) 1.8 L, 
Mono % (Auto) 4.1, Eos % (Auto) 0.2, Baso % (Auto) 0.4, Absolute Neuts (auto) 10.6 H, Absolute Lymphs (auto) 0.21 L, Nucleated RBC % 0, Differential Comment , Sodium 142, Potassium 4.4, Chloride 110 H, Carbon Dioxide 27.0, Anion Gap 5, BUN 22 H, 
Creatinine 0.88, Estim Creat Clear Calc 65.67, Est GFR (MDRD) Af Amer 106, Est GFR (MDRD) Non-Af 87, BUN/Creatinine Ratio 24.9 H, Glucose 96, Calcium 11.2 H, Troponin I High Sens 24, B-Natriuretic Peptide 270.4 H


Imagaing
Radiology Impression

Chest X-Ray  12/21/23 22:07
IMPRESSION:
 
Bilateral lower lobe airspace disease which may represent pneumonia.
 
Electronically Signed:
Amari King MD
2023/12/21 at 22:36 EST
Reading Location ID and State: [id number] / [place]
Tel [phone], Service support  [phone], Fax [phone]
 




Assessment & Plan
Assessment/Plan
(1) Paroxysmal A-fib: 
PLAN: 
Plan
1.  Bibasilar pneumonia evident on chest x-ray with leukocytosis of 11.4 present on admission - Admit to PCU.  Continue broad-spectrum antibiotics and await culture and sensitivity data.  Give Tylenol as needed for mild to moderate level 1-5 out of 
10 pain or fever.  Give Mucinex twice daily to thin and mobilize secretions.  Give nebulizers as needed for wheezing.  The patient's BNP is only mildly elevated to 270.4 pg/mL present on admission making AE CHF less likely primary diagnosis in this 
case.  Finally, we will check V/Q scan to evaluate for possible PE with highly elevated D-dimer of 3.03 present on admission and chronic lower extremity edema.

2.  Paroxysmal atrial fibrillation; with rapid ventricular response of approximately 120 to 130 bpm present on admission complicating #1 - Start full dose Lovenox for CVA prophylaxis.  Also start IV Cardizem drip to be titrated to keep heart rate 
less than or equal to 100 bpm.  Check echocardiogram to evaluate left ventricular ejection fraction.  Finally, we will consult the cardiologist on-call to see this patient on rounds in the a.m. for further recommendations with help appreciated in 
advance.

3.  Right leg cellulitis in the setting of chronic lower extremity lymphedema and very poor mobility compounding #1 and #2 - Continue antibiotics initiated for #1.  Keep leg elevated.  Check lower extremity Doppler to evaluate for DVT with elevated 
D-dimer of 3.03 present on admission.  Finally, he will need to be reset up with wound care follow-up at the time of discharge.

4.  Hypercalcemia of 11.2 mg/dL present on admission adding to the pathology of #1 - #3 - Give normal saline IV fluid plus Lasix 40 mg IV x 1.  Check intact PTH and reassess calcium level in the a.m. to monitor response to treatment.

5.  Acute hypoxic respiratory insufficiency likely arising from #1 - #4 - Wean supplemental oxygen as tolerated.

6.  Obesity; with a BMI of 38.4 present on admission with chronic generalized weakness and ambulatory dysfunction exacerbating #1 - #5 - Weight loss will be recommended.  Check TSH this admission.  Finally, we will consult PT OT and case management 
to see this patient on rounds in the a.m. for further recommendations as he may require ECF for subacute rehabilitation at the time his treatment for this admission is concluded.

7.  History of nonalcoholic fatty liver disease; with splenomegaly and cirrhosis in the setting of previous liver cancer - Stable ascites present on CT this admission.

8.  Essential hypertension - Continue home medications as previous plus give as needed IV hydralazine for systolic blood pressure greater than 160 mmHg. 

9.  Hyperlipidemia; with intolerance to statins - Check lipid profile this admission.

10.  Obstructive sleep apnea; treated with BiPAP - Resume CPAP.

11.  History of asthma - Stable with no evidence of flare at this time.  Continue as needed nebulizers. 

12.  History of aortic stenosis; status post bioprosthetic aortic valve replacement (2011) - Noted.

13.  History of CVA - Noted.

14.  History of retinal artery occlusion (2020) - Noted.

15.  History of chronic thrombocytopenia - Noted with level of 131 present on admission. 

16.  Anemia of chronic disease in the setting of chronic kidney disease; stage III - Stable.  

17.  History of polycystic kidney disease - Noted.

18.  Depression - Continue medications as previous plus give as needed Xanax for breakthrough symptoms.  

19.  Chronic Gout - Stable with no evidence of acute flare.

20.  GERD - Continue PPI.

21.  DVT prophylaxis - Patient on full-dose Lovenox for #2.  


Total time:  Approximately 55 minutes.

## 2023-12-21 NOTE — XMS RPT_ITS
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   
reference range;   
Translations:   
[Thrombocytopenia,   
unspecified]                            Onset:   
10-  
1                         2016                Chronic  
   
                                                    Coronary   
atherosclerosis and   
other heart disease  
(20 sources)                            Atherosclerotic heart   
disease of native   
coronary artery   
without angina   
pectoris;   
Translations:   
[Coronary   
atherosclerosis]                        Onset:   
10-  
1                         2021                Chronic  
   
                                                    Deficiency and other   
anemia  
(20 sources)                            Anemia of chronic   
disease; Translations:   
[Anemia in other   
chronic diseases   
classified elsewhere]                   Onset:   
  
7                         2019                Chronic  
   
                                                    Deficiency and other   
anemia  
(20 sources)                            Iron deficiency anemia   
due to blood loss;   
Translations: [Iron   
deficiency anemia   
secondary to blood   
loss (chronic)]                         Onset:   
  
3                         2023                Chronic  
   
                                                    Deficiency and other   
anemia  
(1 source)                              Iron deficiency anemia   
secondary to blood   
loss (chronic);   
Translations: [Iron   
deficiency anemia due   
to chronic blood loss]                  Onset:   
  
3                                                   Chronic  
   
                                                    Deficiency and other   
anemia  
(3 sources)                             Macrocytic anemia;   
Translations:   
[Nutritional anemia,   
unspecified]                                                Episodic  
   
                                                    Deficiency and other   
anemia  
(1 source)                              Iron deficiency   
anemia; Translations:   
[Iron deficiency   
anemia, unspecified]                                         Episodic  
   
                                                    Disorders of lipid   
metabolism  
(20 sources)                            Mixed hyperlipidemia;   
Translations: [Mixed   
hyperlipidemia]                         Onset:   
10-  
5                         2016                Chronic  
   
                                                    Esophageal disorders  
(20 sources)                            Gastroesophageal   
reflux disease;   
Translations:   
[Gastro-esophageal   
reflux disease without   
esophagitis]                            Onset:   
10-  
1                         2017                Chronic  
   
                                                    Essential hypertension  
(20 sources)                            Essential (primary)   
hypertension;   
Translations:   
[Essential   
hypertension]                           Onset:   
11-  
7                                                   Chronic  
   
                                                    Gastrointestinal   
hemorrhage  
(1 source)                              Melena; Translations:   
[Melena]                                10-          Episodic  
   
                                                    Genitourinary   
congenital anomalies  
(20 sources)                            Multiple renal cysts;   
Translations:   
[Polycystic kidney,   
unspecified]                            Onset:   
  
0                         2010                Chronic  
   
                                                    Genitourinary symptoms   
and ill-defined   
conditions  
(20 sources)                            Urge incontinence of   
urine; Translations:   
[Urge incontinence]                     Onset:   
  
9                         2019                Chronic  
   
                                                    Gout and other crystal   
arthropathies  
(20 sources)                            Gout; Translations:   
[Gout, unspecified]                     2017          Chronic  
   
                                                    Heart valve disorders  
(20 sources)                            Presence of prosthetic   
heart valve;   
Translations: [Aortic   
valve disorder]                         Onset:   
  
7                         2017                Chronic  
   
                                                    Neoplasms of   
unspecified nature or   
uncertain behavior  
(9 sources)                             Monoclonal gammopathy   
(clinical);   
Translations:   
[Monoclonal   
gammopathy]                             Onset:   
  
2                                                   Chronic  
   
                                                    Other and unspecified   
benign neoplasm  
(1 source)                              History of polyp of   
colon; Translations:   
[Personal history of   
colonic polyps]                                             Episodic  
   
                                                    Other connective   
tissue disease  
(1 source)                              Muscle weakness;   
Translations: [Muscle   
weakness   
(generalized)]                                              Episodic  
   
                                                    Other diseases of   
veins and lymphatics  
(1 source)                              Stasis dermatitis;   
Translations: [Venous   
insufficiency   
(chronic)   
(peripheral)]                                               Episodic  
   
                                                    Other diseases of   
veins and lymphatics  
(1 source)                              Skin ulcer of calf ;   
Translations: [Venous   
insufficiency   
(chronic)   
(peripheral)]                                               Episodic  
   
                                                    Other diseases of   
veins and lymphatics  
(1 source)                              Ulcer of lower   
extremity;   
Translations: [Venous   
insufficiency   
(chronic)   
(peripheral)]                                               Episodic  
   
                                                    Other endocrine   
disorders  
(2 sources)                             Hyperparathyroidism;   
Translations:   
[Hyperparathyroidism,   
unspecified]                                                Chronic  
   
                                                    Other gastrointestinal   
disorders  
(20 sources)                            Malabsorption - iron;   
Translations:   
[Intestinal   
malabsorption,   
unspecified]                            Onset:   
  
3                         2023                Chronic  
   
                                                    Other gastrointestinal   
disorders  
(1 source)                              Intestinal   
malabsorption,   
unspecified;   
Translations: [Iron   
malabsorption]                          Onset:   
  
3                                                   Chronic  
   
                                                    Other gastrointestinal   
disorders  
(1 source)                              H/O: liver disease;   
Translations:   
[Personal history of   
other diseases of the   
digestive system]                                           Episodic  
   
                                                    Other injuries and   
conditions due to   
external causes  
(1 source)                              History of fall;   
Translations: [History   
of falling]                                                 Episodic  
   
                                                    Other liver diseases  
(8 sources)                             Cirrhosis of liver;   
Translations:   
[Unspecified cirrhosis   
of liver]                                                   Chronic  
   
                                                    Other liver diseases  
(20 sources)                            Lesion of liver;   
Translations: [Liver   
disease, unspecified]                   Onset:   
  
3                                                   Chronic  
   
                                                    Other liver diseases  
(1 source)                              Disease of liver;   
Translations: [Liver   
disease, unspecified]                     10-          Chronic  
   
                                                    Other liver diseases  
(2 sources)                             Liver disease,   
unspecified;   
Translations: [Liver   
disease]                                Onset:   
  
3                                                   Chronic  
   
                                                    Other liver diseases  
(1 source)                              Unspecified cirrhosis   
of liver;   
Translations:   
[Cirrhosis of liver   
without ascites,   
unspecified hepatic   
cirrhosis type (HCC)]                   Onset:   
  
3                                                   Chronic  
   
                                                    Other liver diseases  
(3 sources)                             Liver mass;   
Translations:   
[Hepatomegaly, not   
elsewhere classified]                                         Episodic  
   
                                                    Other nervous system   
disorders  
(20 sources)                            Polyneuropathy;   
Translations:   
[Polyneuropathy,   
unspecified]                            Onset:   
  
5                         2017                Chronic  
   
                                                    Other nervous system   
disorders  
(1 source)                              Polyneuropathy,   
unspecified;   
Translations:   
[Peripheral   
polyneuropathy]                         Onset:   
  
7                                                   Chronic  
   
                                                    Other nutritional;   
endocrine; and   
metabolic disorders  
(20 sources)                            Obese class II;   
Translations:   
[Obesity, unspecified]                  Onset:   
  
1                         2021                Chronic  
   
                                                    Other nutritional;   
endocrine; and   
metabolic disorders  
(1 source)                              Metabolic syndrome X;   
Translations:   
[Metabolic syndrome]                                         Chronic  
   
                                                    Other nutritional;   
endocrine; and   
metabolic disorders  
(1 source)                              Hypercalcemia;   
Translations:   
[Hypercalcemia]                                             Chronic  
   
                                                    Other nutritional;   
endocrine; and   
metabolic disorders  
(13 sources)                            Obese class I;   
Translations:   
[Obesity, unspecified]                  Onset:   
  
3                         2023                Chronic  
   
                                                    Other nutritional;   
endocrine; and   
metabolic disorders  
(1 source)                              Obesity, unspecified;   
Translations:   
[Obesity, Class I, BMI   
30-34.9]                                Onset:   
  
3                                                   Chronic  
   
                                                    Other nutritional;   
endocrine; and   
metabolic disorders  
(1 source)                              Hypercalcemia;   
Translations:   
[Hypercalcemia]                         Onset:   
  
3                                                   Chronic  
   
                                                    Other skin disorders  
(1 source)                              Eruption;   
Translations: [Rash   
and other nonspecific   
skin eruption]                                              Episodic  
   
                                                    Paralysis  
(2 sources)                             Hemiplegia,   
unspecified affecting   
left nondominant side;   
Translations:   
[Hemiplegia,   
unspecified affecting   
left nondominant side   
(HCC)]                                  Onset:   
01-  
3                                                   Chronic  
   
                                                    Residual codes;   
unclassified  
(20 sources)                            Obstructive sleep   
apnea syndrome;   
Translations:   
[Obstructive sleep   
apnea (adult)   
(pediatric)]                            Onset:   
10-  
5                         2011                Chronic  
   
                                                    Residual codes;   
unclassified  
(1 source)                              Edema of lower   
extremity;   
Translations:   
[Localized edema]                                           Episodic  
   
                                                    Skin and subcutaneous   
tissue infections  
(2 sources)                             Site-specific   
infective disorders of   
skin; Translations:   
[Local infection of   
the skin and   
subcutaneous tissue,   
unspecified]                                                Episodic  
   
                                                    Superficial injury;   
contusion  
(1 source)                              Contusion of hip;   
Translations:   
[Contusion of   
unspecified hip,   
subsequent encounter]                                         Episodic  
   
                                                    Unclassified  
(1 source)                Unknown / UNK(Unknown)    Onset:   
11-  
7                                                     
   
                                                    Unclassified  
(1 source)                              Radiotherapy   
On-treatment Visit                      Onset:   
  
3                                                     
  
  
Past or Other Problems  
  
  
                      Problem Classification Problem    Date       Documented Da  
te Episodic/Chronic  
   
                                                    Deficiency and other   
anemia  
(1 source)                              Nutritional anemia,   
unspecified;   
Translations:   
[Macrocytic anemia]                     Onset:   
2022                                          Episodic  
   
                                                    Diabetes mellitus   
without complication  
(20 sources)                            Impaired fasting   
glycemia;   
Translations:   
[Impaired fasting   
glucose]                                Onset:   
2006                Episodic  
   
                                                    Other and unspecified   
benign neoplasm  
(20 sources)                            Adenomatous polyp   
of colon ;   
Translations:   
[Benign neoplasm of   
colon, unspecified]                     Onset:   
2011                Episodic  
   
                                                    Other and unspecified   
benign neoplasm  
(1 source)                              Personal history of   
colonic polyps;   
Translations:   
[History of colonic   
polyps]                                 Onset:   
2023                                          Episodic  
   
                                                    Other connective tissue   
disease  
(20 sources)                            Recurrent falls ;   
Translations:   
[Repeated falls]                        Onset:   
10-                                          Episodic  
   
                                                    Other connective tissue   
disease  
(1 source)                              Repeated falls;   
Translations:   
[Frequent falls]                        Onset:   
10-                                          Episodic  
   
                                                    Other gastrointestinal   
disorders  
(20 sources)                            Splenomegaly;   
Translations:   
[Splenomegaly, not   
elsewhere   
classified]                             Onset:   
09-                02-                Episodic  
   
                                                    Other gastrointestinal   
disorders  
(1 source)                              Splenomegaly, not   
elsewhere   
classified;   
Translations:   
[Splenomegaly]                          Onset:   
2015                                          Episodic  
   
                                                    Other liver diseases  
(1 source)                              Hepatomegaly, not   
elsewhere   
classified;   
Translations:   
[Liver mass]                            Onset:   
2023                                          Episodic  
   
                                                    Other nervous system   
disorders  
(20 sources)                            Abnormal gait;   
Translations:   
[Unspecified   
abnormalities of   
gait and mobility]                      Onset:   
2016                Episodic  
   
                                                    Other nervous system   
disorders  
(1 source)                              Unspecified   
abnormalities of   
gait and mobility;   
Translations:   
[Abnormality of   
gait]                                   Onset:   
2016                                          Episodic  
   
                                                    Residual codes;   
unclassified  
(1 source)                              Other specified   
personal risk   
factors, not   
elsewhere   
classified;   
Translations: [At   
risk for   
polypharmacy]                           Onset:   
2023                                          Episodic  
   
                                                    Varicose veins of lower   
extremity  
(20 sources)                            Skin ulcer;   
Translations:   
[Varicose veins of   
unspecified lower   
extremity with   
ulcer of   
unspecified site]                       Onset:   
2023                Episodic  
  
  
  
Results  
  
  
                      Test Name  Value      Interpretation Reference Range Facil  
ity  
  
  
  
                                          
   
                                          
   
                                          
   
                                          
   
                                          
   
                                          
   
                                          
   
                                          
   
                                          
   
                                          
   
                                          
   
                                          
   
                                          
   
                                          
   
                                          
   
                                          
   
                                          
   
                                          
   
                                          
   
                                          
   
                                          
   
                                          
   
                                          
   
                                          
   
                                          
   
                                          
   
                                          
   
                                          
   
                                          
   
                                          
   
                                          
   
                                          
   
                                          
   
                                          
   
                                          
   
                                          
   
                                          
   
                                          
   
                                          
   
                                          
   
                                          
   
                                          
   
                                          
   
                                          
   
                                          
   
                                          
   
                                          
   
                                          
   
                                          
   
                                          
   
                                          
   
                                          
   
                                          
   
                                          
   
                                          
   
                                          
   
                                          
   
                                          
   
                                          
   
                                          
   
                                          
   
                                          
   
                                          
   
                                          
   
                                          
   
                                          
   
                                          
   
                                          
   
                                          
   
                                          
   
                                          
   
                                          
   
                                          
   
                                          
   
                                          
   
                                          
   
                                          
   
                                          
   
                                          
   
                                          
   
                                          
   
                                          
   
                                          
   
                                          
   
                                          
   
                                          
   
                                          
   
                                          
   
                                          
   
                                          
   
                                          
   
                                          
   
                                          
   
                                          
   
                                          
   
                                          
   
                                          
   
                                          
   
                                          
   
                                          
   
                                          
   
                                          
   
                                          
   
                                          
   
                                          
   
                                          
   
                                          
   
                                          
   
                                          
   
                                          
   
                                          
   
                                          
   
                                          
   
                                          
   
                                          
   
                                          
   
                                          
   
                                          
   
                                          
   
                                          
   
                                          
   
                                          
   
                                          
   
                                          
   
                                          
   
                                          
   
                                          
   
                                          
   
                                          
   
                                          
   
                                          
   
                                          
   
                                          
   
                                          
   
                                          
   
                                          
   
                                          
   
                                          
   
                                          
   
                                          
   
                                          
   
                                          
   
                                          
   
                                          
   
                                          
   
                                          
   
                                          
   
                                          
   
                                          
   
                                          
   
                                          
   
                                          
   
                                          
   
                                          
   
                                          
   
                                          
   
                                          
   
                                          
   
                                          
   
                                          
   
                                          
   
                                          
   
                                          
   
                                          
   
                                          
   
                                          
   
                                          
   
                                          
   
                                          
   
                                          
   
                                          
   
                                          
   
                                          
   
                                          
   
                                          
   
                                          
   
                                          
   
                                          
   
                                          
   
                                          
   
                                          
   
                                          
   
                                          
   
                                          
   
                                          
   
                                          
   
                                          
   
                                          
   
                                          
   
                                          
   
                                          
   
                                          
   
                                          
   
                                          
   
                                          
   
                                          
   
                                          
   
                                          
   
                                          
   
                                          
   
                                          
   
                                          
   
                                          
   
                                          
   
                                          
   
                                          
   
                                          
   
                                          
   
                                          
   
                                          
   
                                          
   
                                          
   
                                          
   
                                          
   
                                          
   
                                          
   
                                          
   
                                          
   
                                          
   
                                          
   
                                          
   
                                          
   
                                          
   
                                          
   
                                          
   
                                          
   
                                          
   
                                          
   
                                          
   
                                          
   
                                          
   
                                          
   
                                          
   
                                          
   
                                          
   
                                          
   
                                          
   
                                          
   
                                          
   
                                          
   
                                          
   
                                          
   
                                          
   
                                          
   
                                          
   
                                          
   
                                          
   
                                          
   
                                          
   
                                          
   
                                          
   
                                          
   
                                          
   
                                          
   
                                          
   
                                          
   
                                          
   
                                          
   
                                          
   
                                          
   
                                          
   
                                          
   
                                          
   
                                          
   
                                          
   
                                          
   
                                          
   
                                          
   
                                          
   
                                          
   
                                          
   
                                          
   
                                          
   
                                          
   
                                          
   
                                          
   
                                          
   
                                          
   
                                          
   
                                          
   
                                          
   
                                          
   
                                          
   
                                          
   
                                          
   
                                          
   
                                          
   
                                          
   
                                          
   
                                          
   
                                          
   
                                          
   
                                          
   
                                          
   
                                          
   
                                          
   
                                          
   
                                          
   
                                          
   
                                          
   
                                          
   
                                          
   
                                          
   
                                          
   
                                          
   
                                          
   
                                          
   
                                          
   
                                          
   
                                          
   
                                          
   
                                          
   
                                          
   
                                          
   
                                          
   
                                          
   
                                          
   
                                          
   
                                          
   
                                          
   
                                          
   
                                          
   
                                          
   
                                          
   
                                          
   
                                          
   
                                          
   
                                          
   
                                          
   
                                          
   
                                          
   
                                          
   
                                          
   
                                          
   
                                          
   
                                          
   
                                          
   
                                          
   
                                          
   
                                          
   
                                          
   
                                          
   
                                          
   
                                          
   
                                          
   
                                          
   
                                          
   
                                          
   
                                          
   
                                          
   
                                          
   
                                          
   
                                          
   
                                          
   
                                          
   
                                          
   
                                          
   
                                          
   
                                          
   
                                          
   
                                          
   
                                          
   
                                          
   
                                          
   
                                          
   
                                          
   
                                          
   
                                          
   
                                          
   
                                          
   
                                          
   
                                          
   
                                          
   
                                          
   
                                          
   
                                          
   
                                          
   
                                          
   
                                          
   
                                          
   
                                          
   
                                          
   
                                          
   
                                          
   
                                          
   
                                          
   
                                          
   
                                          
   
                                          
   
                                          
   
                                          
   
                                          
   
                                          
   
                                          
   
                                          
   
                                          
   
                                          
   
                                          
   
                                          
   
                                          
   
                                          
   
                                          
   
                                          
   
                                          
   
                                          
   
                                          
   
                                          
   
                                          
   
                                          
   
                                          
   
                                          
   
                                          
   
                                          
   
                                          
   
                                          
   
                                          
   
                                          
   
                                          
   
                                          
   
                                          
   
                                          
   
                                          
   
                                          
   
                                          
   
                                          
   
                                          
   
                                          
   
                                          
   
                                          
   
                                          
   
                                          
   
                                          
   
                                          
   
                                          
   
                                          
   
                                          
   
                                          
   
                                          
   
                                          
   
                                          
   
                                          
   
                                          
   
                                          
   
                                          
   
                                          
   
                                          
   
                                          
   
                                          
   
                                          
   
                                          
   
                                          
   
                                          
   
                                          
   
                                          
   
                                          
   
                                          
   
                                          
   
                                          
   
                                          
   
                                          
   
                                          
   
                                          
   
                                          
   
                                          
   
                                          
   
                                          
   
                                          
   
                                          
   
                                          
   
                                          
   
                                          
   
                                          
   
                                          
   
                                          
   
                                          
   
                                          
   
                                          
   
                                          
   
                                          
   
                                          
   
                                          
   
                                          
   
                                          
   
                                          
   
                                          
   
                                          
   
                                          
   
                                          
   
                                          
   
                                          
   
                                          
   
                                          
   
                                          
   
                                          
   
                                          
   
                                          
   
                                          
   
                                          
   
                                          
   
                                          
   
                                          
   
                                          
   
                                          
   
                                          
   
                                          
   
                                          
   
                                          
   
                                          
   
                                          
   
                                          
   
                                          
   
                                          
   
                                          
   
                                          
   
                                          
   
                                          
   
                                          
   
                                          
   
                                          
   
                                          
   
                                          
   
                                          
   
                                          
   
                                          
   
                                          
   
                                          
   
                                          
   
                                          
   
                                          
   
                                          
   
                                          
   
                                          
   
                                          
   
                                          
   
                                          
   
                                          
   
                                          
   
                                          
   
                                          
   
                                          
   
                                          
   
                                          
   
                                          
   
                                          
   
                                          
   
                                          
   
                                          
   
                                          
   
                                          
   
                                          
   
                                          
   
                                          
   
                                          
   
                                          
   
                                          
   
                                          
   
                                          
   
                                          
   
                                          
   
                                          
   
                                          
   
                                          
   
                                          
   
                                          
   
                                          
   
                                          
   
                                          
   
                                          
   
                                          
   
                                          
   
                                          
   
                                          
   
                                          
   
                                          
   
                                          
   
                                          
   
                                          
   
                                          
   
                                          
   
                                          
   
                                          
   
                                          
   
                                          
   
                                          
   
                                          
   
                                          
   
                                          
   
                                          
   
                                          
   
                                          
   
                                          
   
                                          
   
                                          
   
                                          
   
                                          
   
                                          
   
                                          
   
                                          
   
                                          
   
                                          
   
                                          
   
                                          
   
                                          
   
                                          
   
                                          
   
                                          
   
                                          
   
                                          
   
                                          
   
                                          
   
                                          
   
                                          
   
                                          
   
                                          
   
                                          
   
                                          
   
                                          
   
                                          
   
                                          
   
                                          
   
                                          
   
                                          
   
                                          
   
                                          
   
                                          
   
                                          
   
                                          
   
                                          
   
                                          
   
                                          
   
                                          
   
                                          
   
                                          
   
                                          
   
                                          
   
                                          
   
                                          
   
                                          
   
                                          
   
                                          
   
                                          
   
                                          
   
                                          
   
                                          
   
                                          
   
                                          
   
                                          
   
                                          
   
                                          
   
                                          
   
                                          
   
                                          
   
                                          
   
                                          
   
                                          
   
                                          
   
                                          
   
                                          
   
                                          
   
                                          
   
                                          
   
                                          
   
                                          
   
                                          
   
                                          
   
                                          
   
                                          
   
                                          
   
                                          
   
                                          
   
                                          
   
                                          
   
                                          
   
                                          
   
                                          
   
                                          
   
                                          
   
                                          
   
                                          
   
                                          
   
                                          
   
                                          
   
                                          
   
                                          
   
                                          
   
                                          
   
                                          
   
                                          
   
                                          
   
                                          
   
                                          
   
                                          
   
                                          
   
                                          
   
                                          
   
                                          
   
                                          
   
                                          
   
                                          
   
                                          
   
                                          
   
                                          
   
                                          
   
                                          
   
                                          
   
                                          
   
                                          
   
                                          
   
                                          
   
                                          
   
                                          
   
                                          
   
                                          
   
                                          
   
                                          
   
                                          
   
                                          
   
                                          
   
                                          
   
                                          
   
                                          
   
                                          
   
                                          
   
                                          
   
                                          
   
                                          
   
                                          
   
                                          
   
                                          
   
                                          
   
                                          
   
                                          
   
                                          
   
                                          
   
                                          
   
                                          
   
                                          
   
                                          
   
                                          
   
                                          
   
                                          
   
                                          
   
                                          
   
                                          
   
                                          
   
                                          
   
                                          
   
                                          
   
                                          
   
                                          
   
                                          
   
                                          
   
                                          
   
                                          
   
                                          
   
                                          
   
                                          
   
                                          
   
                                          
   
                                          
   
                                          
   
                                          
   
                                          
   
                                          
   
                                          
   
                                          
   
                                          
   
                                          
   
                                          
   
                                          
   
                                          
   
                                          
   
                                          
   
                                          
   
                                          
   
                                          
   
                                          
   
                                          
   
                                          
   
                                          
   
                                          
   
                                          
   
                                          
   
                                          
   
                                          
   
                                          
   
                                          
   
                                          
   
                                          
   
                                          
   
                                          
   
                                          
   
                                          
   
                                          
   
                                          
   
                                          
   
                                          
   
                                          
   
                                          
   
                                          
   
                                          
   
                                          
   
                                          
   
                                          
   
                                          
   
                                          
   
                                          
   
                                          
   
                                          
   
                                          
   
                                          
   
                                          
   
                                          
   
                                          
   
                                          
   
                                          
   
                                          
   
                                          
   
                                          
   
                                          
   
                                          
   
                                          
   
                                          
   
                                          
   
                                          
   
                                          
   
                                          
   
                                          
   
                                          
   
                                          
   
                                          
   
                                          
   
                                          
   
                                          
   
                                          
   
                                          
   
                                          
   
                                          
   
                                          
   
                                          
   
                                          
   
                                          
   
                                          
   
                                          
   
                                          
   
                                          
   
                                          
   
                                          
   
                                          
   
                                          
   
                                          
   
                                          
   
                                          
   
                                          
   
                                          
   
                                          
   
                                          
   
                                          
   
                                          
   
                                          
   
                                          
   
                                          
   
                                          
   
                                          
   
                                          
   
                                          
   
                                          
   
                                          
   
                                          
   
                                          
   
                                          
   
                                          
   
                                          
   
                                          
   
                                          
   
                                          
   
                                          
   
                                          
   
                                          
   
                                          
   
                                          
   
                                          
   
                                          
   
                                          
   
                                          
   
                                          
   
                                          
   
                                          
   
                                          
   
                                          
   
                                          
   
                                          
   
                                          
   
                                          
   
                                          
   
                                          
   
                                          
   
                                          
   
                                          
   
                                          
   
                                          
   
                                          
   
                                          
   
                                          
   
                                          
   
                                          
   
                                          
   
                                          
   
                                          
   
                                          
   
                                          
   
                                          
   
                                          
   
                                          
   
                                          
   
                                          
   
                                          
   
                                          
   
                                          
   
                                          
   
                                          
   
                                          
   
                                          
   
                                          
   
                                          
   
                                          
   
                                          
   
                                          
   
                                          
   
                                          
   
                                          
   
                                          
   
                                          
   
                                          
   
                                          
   
                                          
   
                                          
   
                                          
   
                                          
   
                                          
   
                                          
   
                                          
   
                                          
   
                                          
   
                                          
   
                                          
   
                                          
   
                                          
   
                                          
   
                                          
   
                                          
   
                                          
   
                                          
   
                                          
   
                                          
   
                                          
   
                                          
   
                                          
   
                                          
   
                                          
   
                                          
   
                                          
   
                                          
   
                                          
   
                                          
   
                                          
   
                                          
   
                                          
   
                                          
   
                                          
   
                                          
   
                                          
   
                                          
   
                                          
   
                                          
   
                                          
   
                                          
   
                                          
   
                                          
   
                                          
   
                                          
   
                                          
   
                                          
   
                                          
   
                                          
   
                                          
   
                                          
   
                                          
   
                                          
   
                                          
   
                                          
   
                                          
   
                                          
   
                                          
   
                                          
   
                                          
   
                                          
   
                                          
   
                                          
   
                                          
   
                                          
   
                                          
   
                                          
   
                                          
   
                                          
   
                                          
   
                                          
   
                                          
   
                                          
   
                                          
   
                                          
   
                                          
   
                                          
   
                                          
   
                                          
   
                                          
   
                                          
   
                                          
   
                                          
   
                                          
   
                                          
   
                                          
   
                                          
   
                                          
   
                                          
   
                                          
   
                                          
   
                                          
   
                                          
   
                                          
   
                                          
   
                                          
   
                                          
   
                                          
   
                                          
   
                                          
   
                                          
   
                                          
   
                                          
   
                                          
   
                                          
   
                                          
   
                                          
   
                                          
   
                                          
   
                                          
   
                                          
   
                                          
   
                                          
   
                                          
   
                                          
   
                                          
   
                                          
   
                                          
   
                                          
   
                                          
   
                                          
   
                                          
   
                                          
   
                                          
   
                                          
   
                                          
   
                                          
   
                                          
   
                                          
   
                                          
   
                                          
   
                                          
   
                                          
   
                                          
   
                                          
   
                                          
   
                                          
   
                                          
   
                                          
   
                                          
   
                                          
   
                                          
   
                                          
   
                                          
   
                                          
   
                                          
   
                                          
   
                                          
   
                                          
   
                                          
   
                                          
   
                                          
   
                                          
   
                                          
   
                                          
   
                                          
   
                                          
   
                                          
   
                                          
   
                                          
   
                                          
   
                                          
   
                                          
   
                                          
   
                                          
   
                                          
   
                                          
   
                                          
   
                                          
   
                                          
   
                                          
   
                                          
   
                                          
   
                                          
   
                                          
   
                                          
   
                                          
   
                                          
   
                                          
   
                                          
   
                                          
   
                                          
   
                                          
   
                                          
   
                                          
   
                                          
   
                                          
   
                                          
   
                                          
   
                                          
   
                                          
   
                                          
   
                                          
   
                                          
   
                                          
   
                                          
   
                                          
   
                                          
   
                                          
   
                                          
   
                                          
   
                                          
   
                                          
   
                                          
   
                                          
   
                                          
   
                                          
   
                                          
   
                                          
   
                                          
   
                                          
   
                                          
   
                                          
   
                                          
   
                                          
   
                                          
   
                                          
   
                                          
   
                                          
   
                                          
   
                                          
   
                                          
   
                                          
   
                                          
   
                                          
   
                                          
   
                                          
   
                                          
   
                                          
   
                                          
   
                                          
   
                                          
   
                                          
   
                                          
   
                                          
   
                                          
   
                                          
   
                                          
   
                                          
   
                                          
   
                                          
   
                                          
   
                                          
   
                                          
   
                                          
   
                                          
   
                                          
   
                                          
   
                                          
   
                                          
   
                                          
   
                                          
   
                                          
   
                                          
   
                                          
   
                                          
   
                                          
   
                                          
   
                                          
   
                                          
   
                                          
   
                                          
   
                                          
   
                                          
   
                                          
   
                                          
   
                                          
   
                                          
   
                                          
   
                                          
   
                                          
   
                                          
   
                                          
   
                                          
   
                                          
   
                                          
   
                                          
   
                                          
   
                                          
   
                                          
   
                                          
   
                                          
   
                                          
   
                                          
   
                                          
   
                                          
   
                                          
   
                                          
   
                                          
   
                                          
   
                                          
   
                                          
   
                                          
   
                                          
   
                                          
   
                                          
   
                                          
   
                                          
   
                                          
   
                                          
   
                                          
   
                                          
   
                                          
   
                                          
   
                                          
   
                                          
   
                                          
   
                                          
   
                                          
   
                                          
   
                                          
   
                                          
   
                                          
   
                                          
   
                                          
   
                                          
   
                                          
   
                                          
   
                                          
   
                                          
   
                                          
   
                                          
   
                                          
   
                                          
   
                                          
   
                                          
   
                                          
   
                                          
   
                                          
   
                                          
   
                                          
   
                                          
   
                                          
   
                                          
   
                                          
   
                                          
   
                                          
   
                                          
   
                                          
   
                                          
   
                                          
   
                                          
   
                                          
   
                                          
   
                                          
   
                                          
   
                                          
   
                                          
   
                                          
   
                                          
   
                                          
   
                                          
   
                                          
   
                                          
   
                                          
   
                                          
   
                                          
   
                                          
   
                                          
   
                                          
   
                                          
   
                                          
   
                                          
   
                                          
   
                                          
   
                                          
   
                                          
   
                                          
   
                                          
   
                                          
   
                                          
   
                                          
   
                                          
   
                                          
   
                                          
   
                                          
   
                                          
   
                                          
   
                                          
   
                                          
   
                                          
   
                                          
   
                                          
   
                                          
   
                                          
   
                                          
   
                                          
   
                                          
   
                                          
   
                                          
   
                                          
   
                                          
   
                                          
   
                                          
   
                                          
   
                                          
   
                                          
   
                                          
   
                                          
   
                                          
   
                                          
   
                                          
   
                                          
   
                                          
   
                                          
   
                                          
   
                                          
   
                                          
   
                                          
   
                                          
   
                                          
   
                                          
   
                                          
   
                                          
   
                                          
   
                                          
   
                                          
   
                                          
   
                                          
   
                                          
   
                                          
   
                                          
   
                                          
   
                                          
   
                                          
   
                                          
   
                                          
   
                                          
   
                                          
   
                                          
   
                                          
   
                                          
   
                                          
   
                                          
   
                                          
   
                                          
   
                                          
   
                                          
   
                                          
   
                                          
   
                                          
   
                                          
   
                                          
   
                                          
   
                                          
   
                                          
   
                                          
   
                                          
   
                                          
   
                                          
   
                                          
   
                                          
   
                                          
   
                                          
   
                                          
   
                                          
   
                                          
   
                                          
   
                                          
   
                                          
   
                                          
   
                                          
   
                                          
   
                                          
   
                                          
   
                                          
   
                                          
   
                                          
   
                                          
   
                                          
   
                                          
   
                                          
   
                                          
   
                                          
   
                                          
   
                                          
   
                                          
   
                                          
   
                                          
   
                                          
   
                                          
   
                                          
   
                                          
   
                                          
   
                                          
   
                                          
   
                                          
   
                                          
   
                                          
   
                                          
   
                                          
   
                                          
   
                                          
   
                                          
   
                                          
   
                                          
   
                                          
   
                                          
   
                                          
   
                                          
   
                                          
   
                                          
   
                                          
   
                                          
   
                                          
   
                                          
   
                                          
   
                                          
   
                                          
   
                                          
   
                                          
   
                                          
   
                                          
   
                                          
   
                                          
   
                                          
   
                                          
   
                                          
   
                                          
   
                                          
   
                                          
   
                                          
   
                                          
   
                                          
   
                                          
   
                                          
   
                                          
   
                                          
   
                                          
   
                                          
   
                                          
   
                                          
   
                                          
   
                                          
   
                                          
   
                                          
   
                                          
   
                                          
   
                                          
   
                                          
   
                                          
   
                                          
   
                                          
   
                                          
   
                                          
   
                                          
   
                                          
   
                                          
   
                                          
   
                                          
   
                                          
   
                                          
   
                                          
   
                                          
   
                                          
   
                                          
   
                                          
   
                                          
   
                                          
   
                                          
   
                                          
   
                                          
   
                                          
   
                                          
  
  
  
Vital Signs  
  
  
                      Date Time  Vital Sign Value      [facility]  
[facility]  
   
                                                    10-   
14:          Body height         177.8 cm            Juanito Esparza MD  
Work Phone:   
[phone]                          [facility]  
   
                                                    10-   
14:          Body temperature    97.81 [degF]        Juanito Esparza MD  
Work Phone:   
[phone]                          [facility]  
   
                                                    10-   
14:          Body weight         111.86 kg           Juanito Esparza MD  
Work Phone:   
[phone]                          [facility]  
   
                                                    10-   
14:                              Diastolic blood   
pressure                  89 mm[Hg]                 Juanito Esparza MD  
Work Phone:   
[phone]                          [facility]  
   
                                                    10-   
14:          Heart rate          129 /min            Juanito Esparza MD  
Work Phone:   
[phone]                          [facility]  
   
                                                    10-   
14:                              SaO2% (BldA) [Mass   
fraction]                 98 %                      Juanito Esparza MD  
Work Phone:   
[phone]                          [facility]  
   
                                                    10-   
14:                              Systolic blood   
pressure                  118 mm[Hg]                Juanito Esparza MD  
Work Phone:   
[phone]                          [facility]  
   
                                                    2023   
09:          Body temperature    97.5 [degF]         [facility]  
Work Phone:   
[phone]                          [facility]  
   
                                                    2023   
09:                              Diastolic blood   
pressure                  79 mm[Hg]                 [facility]  
Work Phone:   
[phone]                          [facility]  
   
                                                    2023   
09:          Heart rate          100 /min            [facility]  
Work Phone:   
[phone]                          [facility]  
   
                                                    2023   
09:                              Systolic blood   
pressure                  131 mm[Hg]                [facility]  
Work Phone:   
[phone]                          [facility]  
   
                                                    2023   
11:          Body temperature    97 [degF]           [facility]  
Work Phone:   
[phone]                          [facility]  
   
                                                    2023   
11:                              Diastolic blood   
pressure                  77 mm[Hg]                 [facility]  
Work Phone:   
[phone]                          [facility]  
   
                                                    2023   
11:          Heart rate          70 /min             [facility]  
Work Phone:   
[phone]                          [facility]  
   
                                                    2023   
11:                              Systolic blood   
pressure                  127 mm[Hg]                [facility]  
Work Phone:   
[phone]                          [facility]  
   
                                                    2023   
09:          Body temperature    97.9 [degF]         [facility]  
Work Phone:   
[phone]                          [facility]  
   
                                                    2023   
09:                              Diastolic blood   
pressure                  89 mm[Hg]                 [facility]  
Work Phone:   
[phone]                          [facility]  
   
                                                    2023   
09:          Heart rate          71 /min             [facility]  
Work Phone:   
[phone]                          [facility]  
   
                                                    2023   
09:                              Systolic blood   
pressure                  133 mm[Hg]                [facility]  
Work Phone:   
[phone]                          [facility]  
   
                                                    2023   
09:          Body temperature    98.6 [degF]         [facility]  
Work Phone:   
[phone]                          [facility]  
   
                                                    2023   
09:                              Diastolic blood   
pressure                  65 mm[Hg]                 [facility]  
Work Phone:   
[phone]                          [facility]  
   
                                                    2023   
09:          Heart rate          65 /min             [facility]  
Work Phone:   
[phone]                          [facility]  
   
                                                    2023   
09:                              SaO2% (BldA) [Mass   
fraction]                 95 %                      [facility]  
Work Phone:   
[phone]                          [facility]  
   
                                                    2023   
09:                              Systolic blood   
pressure                  108 mm[Hg]                [facility]  
Work Phone:   
[phone]                          [facility]  
   
                                                    2023   
09:          Body temperature    97.9 [degF]         [facility]  
Work Phone:   
[phone]                          [facility]  
   
                                                    2023   
09:                              Diastolic blood   
pressure                  68 mm[Hg]                 [facility]  
Work Phone:   
[phone]                          [facility]  
   
                                                    2023   
09:          Heart rate          60 /min             [facility]  
Work Phone:   
[phone]                          [facility]  
   
                                                    2023   
09:                              SaO2% (BldA) [Mass   
fraction]                 98 %                      [facility]  
Work Phone:   
[phone]                          [facility]  
   
                                                    2023   
09:                              Systolic blood   
pressure                  107 mm[Hg]                [facility]  
Work Phone:   
[phone]                          [facility]  
   
                                                    2023   
15:          Body height         171 cm              Tello Masci DO  
Work Phone:   
[phone]                          [facility]  
   
                                                    2023   
15:          Body temperature    97.9 [degF]         Tello Masci DO  
Work Phone:   
[phone]                          [facility]  
   
                                                    2023   
15:          Body weight         109.77 kg           Tello Masci DO  
Work Phone:   
[phone]                          [facility]  
   
                                                    2023   
15:                              Diastolic blood   
pressure                  63 mm[Hg]                 Tello Masci DO  
Work Phone:   
[phone]                          [facility]  
   
                                                    2023   
15:          Heart rate          70 /min             Tello Masci DO  
Work Phone:   
[phone]                          [facility]  
   
                                                    2023   
15:                              SaO2% (BldA) [Mass   
fraction]                 100 %                     Tello Ortega DO  
Work Phone:   
[phone]                          [facility]  
   
                                                    2023   
15:                              Systolic blood   
pressure                  108 mm[Hg]                Tello Ortega DO  
Work Phone:   
[phone]                          [facility]  
   
                                                    2023   
14:          Body temperature    97.9 [degF]         [facility]  
Work Phone:   
[phone]                          [facility]  
   
                                                    2023   
14:          Body weight         107.32 kg           [facility]  
Work Phone:   
[phone]                          [facility]  
   
                                                    2023   
14:                              Diastolic blood   
pressure                  65 mm[Hg]                 [facility]  
Work Phone:   
[phone]                          [facility]  
   
                                                    2023   
14:          Heart rate          75 /min             [facility]  
Work Phone:   
[phone]                          [facility]  
   
                                                    2023   
14:          Respiratory rate    16 /min             [facility]  
Work Phone:   
[phone]                          [facility]  
   
                                                    2023   
14:                              SaO2% (BldA) [Mass   
fraction]                 96 %                      [facility]  
Work Phone:   
[phone]                          [facility]  
   
                                                    2023   
14:                              Systolic blood   
pressure                  111 mm[Hg]                [facility]  
Work Phone:   
[phone]                          [facility]  
   
                                                    2023   
09:          Body height         177.8 cm            Juanito Esparza MD  
Work Phone:   
[phone]                          [facility]  
   
                                                    2023   
09:          Body temperature    97.3 [degF]         Juanito Esparza MD  
Work Phone:   
[phone]                          [facility]  
   
                                                    2023   
09:          Body weight         111.95 kg           Juanito Esparza MD  
Work Phone:   
[phone]                          [facility]  
   
                                                    2023   
09:                              Diastolic blood   
pressure                  60 mm[Hg]                 Juanito Esparza MD  
Work Phone:   
[phone]                          [facility]  
   
                                                    2023   
09:          Heart rate          77 /min             Juanito Esparza MD  
Work Phone:   
[phone]                          [facility]  
   
                                                    2023   
09:                              SaO2% (BldA) [Mass   
fraction]                 98 %                      Juanito Esparza MD  
Work Phone:   
[phone]                          [facility]  
   
                                                    2023   
09:                              Systolic blood   
pressure                  113 mm[Hg]                Juanito Esparza MD  
Work Phone:   
[phone]                          [facility]  
   
                                                    2023   
10:          Body weight         108.86 kg           Marin Fish MD  
Work Phone:   
[phone]                          [facility]  
   
                                                    2023   
10:                              Diastolic blood   
pressure                  58 mm[Hg]                 Marin Fish MD  
Work Phone:   
[phone]                          [facility]  
   
                                                    2023   
10:          Heart rate          70 /min             Marin Fish MD  
Work Phone:   
[phone]                          [facility]  
   
                                                    2023   
10:          Respiratory rate    18 /min             Marin Fish MD  
Work Phone:   
[phone]                          [facility]  
   
                                                    2023   
10:                              SaO2% (BldA) [Mass   
fraction]                 89 %                      Marin Fish MD  
Work Phone:   
[phone]                          [facility]  
   
                                                    2023   
10:                              Systolic blood   
pressure                  106 mm[Hg]                Marin Fish MD  
Work Phone:   
[phone]                          [facility]  
   
                                                    2023   
18:          Body weight         109.32 kg           Júnior Ahuja MD  
Work Phone:   
[phone]                          [facility]  
   
                                                    2023   
18:                              Diastolic blood   
pressure                  72 mm[Hg]                 Júnior Ahuja MD  
Work Phone:   
[phone]                          [facility]  
   
                                                    2023   
18:          Heart rate          72 /min             Júnior Ahuja MD  
Work Phone:   
[phone]                          [facility]  
   
                                                    2023   
18:                              SaO2% (BldA) [Mass   
fraction]                 100 %                     Júnior Ahuja MD  
Work Phone:   
[phone]                          [facility]  
   
                                                    2023   
18:                              Systolic blood   
pressure                  134 mm[Hg]                Júnior Ahuja MD  
Work Phone:   
[phone]                          [facility]  
   
                                                    03-   
14:          Body temperature    98.01 [degF]        Marin Rankin MD  
Work Phone:   
[phone]                          [facility]  
   
                                                    03-   
14:          Body weight         108.23 kg           Marin Rankin MD  
Work Phone:   
[phone]                          [facility]  
   
                                                    03-   
14:                              Diastolic blood   
pressure                  82 mm[Hg]                 Marin Rankin MD  
Work Phone:   
[phone]                          [facility]  
   
                                                    03-   
14:          Heart rate          92 /min             Marin Raknin MD  
Work Phone:   
[phone]                          [facility]  
   
                                                    03-   
14:          Respiratory rate    18 /min             Marin Rankin MD  
Work Phone:   
[phone]                          [facility]  
   
                                                    03-   
14:                              SaO2% (BldA) [Mass   
fraction]                 98 %                      Marin Rankin MD  
Work Phone:   
[phone]                          [facility]  
   
                                                    03-   
14:                              Systolic blood   
pressure                  132 mm[Hg]                Marin Rankin MD  
Work Phone:   
[phone]                          [facility]  
   
                                                    2023   
09:          Body temperature    97.3 [degF]         Tello Ortega DO  
Work Phone:   
[phone]                          [facility]  
   
                                                    2023   
09:          Body weight         108.18 kg           Tello Harti DO  
Work Phone:   
[phone]                          [facility]  
   
                                                    2023   
09:                              Diastolic blood   
pressure                  72 mm[Hg]                 Tello Tanneri DO  
Work Phone:   
[phone]                          [facility]  
   
                                                    2023   
09:          Heart rate          66 /min             Tello Harti DO  
Work Phone:   
[phone]                          [facility]  
   
                                                    2023   
09:                              SaO2% (BldA) [Mass   
fraction]                 99 %                      Tello Ortega DO  
Work Phone:   
[phone]                          [facility]  
   
                                                    2023   
09:                              Systolic blood   
pressure                  122 mm[Hg]                Tello Ortega DO  
Work Phone:   
[phone]                          [facility]  
   
                                                    02-   
15:          Body temperature    98.2 [degF]         [facility]  
Work Phone:   
[phone]                          [facility]  
   
                                                    02-   
15:                              Diastolic blood   
pressure                  92 mm[Hg]                 [facility]  
Work Phone:   
[phone]                          [facility]  
   
                                                    02-   
15:          Heart rate          75 /min             [facility]  
Work Phone:   
[phone]                          [facility]  
   
                                                    02-   
15:          Respiratory rate    16 /min             [facility]  
Work Phone:   
[phone]                          [facility]  
   
                                                    02-   
15:                              SaO2% (BldA) [Mass   
fraction]                 100 %                     [facility]  
Work Phone:   
[phone]                          [facility]  
   
                                                    02-   
15:                              Systolic blood   
pressure                  129 mm[Hg]                [facility]  
Work Phone:   
[phone]                          [facility]  
   
                                                    2023   
15:          Body temperature    96.91 [degF]        [facility]  
Work Phone:   
[phone]                          [facility]  
   
                                                    2023   
15:                              Diastolic blood   
pressure                  74 mm[Hg]                 [facility]  
Work Phone:   
[phone]                          [facility]  
   
                                                    2023   
15:          Heart rate          73 /min             [facility]  
Work Phone:   
[phone]                          [facility]  
   
                                                    2023   
15:          Respiratory rate    18 /min             [facility]  
Work Phone:   
[phone]                          [facility]  
   
                                                    2023   
15:                              SaO2% (BldA) [Mass   
fraction]                 100 %                     [facility]  
Work Phone:   
[phone]                          [facility]  
   
                                                    2023   
15:                              Systolic blood   
pressure                  131 mm[Hg]                [facility]  
Work Phone:   
[phone]                          [facility]  
   
                                                    02-   
14:          Body temperature    97.2 [degF]         [facility]  
Work Phone:   
[phone]                          [facility]  
   
                                                    02-   
14:                              Diastolic blood   
pressure                  74 mm[Hg]                 [facility]  
Work Phone:   
[phone]                          [facility]  
   
                                                    02-   
14:          Heart rate          91 /min             [facility]  
Work Phone:   
[phone]                          [facility]  
   
                                                    02-   
14:          Respiratory rate    16 /min             [facility]  
Work Phone:   
[phone]                          [facility]  
   
                                                    02-   
14:                              SaO2% (BldA) [Mass   
fraction]                 98 %                      [facility]  
Work Phone:   
[phone]                          [facility]  
   
                                                    02-   
14:                              Systolic blood   
pressure                  136 mm[Hg]                [facility]  
Work Phone:   
[phone]                          [facility]  
   
                                                    2023   
15:          Body temperature    97.59 [degF]        [facility]  
Work Phone:   
[phone]                          [facility]  
   
                                                    2023   
15:                              Diastolic blood   
pressure                  90 mm[Hg]                 [facility]  
Work Phone:   
[phone]                          [facility]  
   
                                                    2023   
15:          Heart rate          88 /min             [facility]  
Work Phone:   
[phone]                          [facility]  
   
                                                    2023   
15:          Respiratory rate    18 /min             [facility]  
Work Phone:   
[phone]                          [facility]  
   
                                                    2023   
15:                              SaO2% (BldA) [Mass   
fraction]                 99 %                      [facility]  
Work Phone:   
[phone]                          [facility]  
   
                                                    2023   
15:                              Systolic blood   
pressure                  151 mm[Hg]                [facility]  
Work Phone:   
[phone]                          [facility]  
   
                                                    2023   
15:          Body temperature    97.2 [degF]         [facility]  
Work Phone:   
[phone]                          [facility]  
   
                                                    2023   
15:                              Diastolic blood   
pressure                  72 mm[Hg]                 [facility]  
Work Phone:   
[phone]                          [facility]  
   
                                                    2023   
15:          Heart rate          87 /min             [facility]  
Work Phone:   
[phone]                          [facility]  
   
                                                    2023   
15:                              Systolic blood   
pressure                  144 mm[Hg]                [facility]  
Work Phone:   
[phone]                          [facility]  
   
                                                    2023   
15:          Body temperature    96.6 [degF]         [facility]  
Work Phone:   
[phone]                          [facility]  
   
                                                    2023   
15:                              Diastolic blood   
pressure                  77 mm[Hg]                 [facility]  
Work Phone:   
[phone]                          [facility]  
   
                                                    2023   
15:          Heart rate          86 /min             [facility]  
Work Phone:   
[phone]                          [facility]  
   
                                                    2023   
15:          Respiratory rate    16 /min             [facility]  
Work Phone:   
[phone]                          [facility]  
   
                                                    2023   
15:                              SaO2% (BldA) [Mass   
fraction]                 98 %                      [facility]  
Work Phone:   
[phone]                          [facility]  
   
                                                    2023   
15:                              Systolic blood   
pressure                  147 mm[Hg]                [facility]  
Work Phone:   
[phone]                          [facility]  
   
                                                    2023   
14:          Body temperature    97.11 [degF]        [facility]  
Work Phone:   
[phone]                          [facility]  
   
                                                    2023   
14:                              Diastolic blood   
pressure                  69 mm[Hg]                 [facility]  
Work Phone:   
[phone]                          [facility]  
   
                                                    2023   
14:          Heart rate          70 /min             [facility]  
Work Phone:   
[phone]                          [facility]  
   
                                                    2023   
14:                              SaO2% (BldA) [Mass   
fraction]                 96 %                      [facility]  
Work Phone:   
[phone]                          [facility]  
   
                                                    2023   
14:                              Systolic blood   
pressure                  126 mm[Hg]                [facility]  
Work Phone:   
[phone]                          [facility]  
   
                                                    2023   
14:          Body temperature    97.81 [degF]        [facility]  
Work Phone:   
[phone]                          [facility]  
   
                                                    2023   
14:                              Diastolic blood   
pressure                  68 mm[Hg]                 [facility]  
Work Phone:   
[phone]                          [facility]  
   
                                                    2023   
14:          Heart rate          61 /min             [facility]  
Work Phone:   
[phone]                          [facility]  
   
                                                    2023   
14:                              SaO2% (BldA) [Mass   
fraction]                 100 %                     [facility]  
Work Phone:   
[phone]                          [facility]  
   
                                                    2023   
14:                              Systolic blood   
pressure                  128 mm[Hg]                [facility]  
Work Phone:   
[phone]                          [facility] [facility]  
   
                                                    2023   
15:          Body temperature    97.2 [degF]         Júnior Ahuja MD  
Work Phone:   
[phone]                          [facility]  
   
                                                    2023   
15:          Body weight         111.58 kg           Júnior Ahuja MD  
Work Phone:   
[phone]                          [facility]  
   
                                                    2023   
15:                              Diastolic blood   
pressure                  64 mm[Hg]                 Júnior Ahuja MD  
Work Phone:   
[phone]                          [facility]  
   
                                                    2023   
15:          Heart rate          76 /min             Júnior Ahuja MD  
Work Phone:   
[phone]                          [facility]  
   
                                                    2023   
15:          Respiratory rate    16 /min             Júnior Ahuja MD  
Work Phone:   
[phone]                          [facility]  
   
                                                    2023   
15:                              Systolic blood   
pressure                  114 mm[Hg]                Júnior Ahuja MD  
Work Phone:   
[phone]                          [facility]  
   
                                                    2023   
13:          Body height         170.2 cm            Nathalia Bernal PA-C  
Work Phone:   
[phone]                          [facility]  
   
                                                    2023   
13:          Body temperature    98.91 [degF]        Nathalia Stidham PA-C  
Work Phone:   
[phone]                          [facility]  
   
                                                    2023   
13:          Body weight         114.31 kg           Nathalia Stidham PA-C  
Work Phone:   
[phone]                          [facility]  
   
                                                    2023   
13:                              Diastolic blood   
pressure                  80 mm[Hg]                 Nathalia Dolores PA-C  
Work Phone:   
[phone]                          [facility]  
   
                                                    2023   
13:          Heart rate          110 /min            Nathalia Stidham PA-C  
Work Phone:   
[phone]                          [facility]  
   
                                                    2023   
13:                              SaO2% (BldA) [Mass   
fraction]                 94 %                      Nathalia Dolores PA-C  
Work Phone:   
[phone]                          [facility]  
   
                                                    2023   
13:                              Systolic blood   
pressure                  140 mm[Hg]                Nathalia Stidham PA-C  
Work Phone:   
[phone]                          [facility]  
   
                                                    2022   
15:          Body temperature    98.01 [degF]        Tello Masci DO  
Work Phone:   
[phone]                          [facility]  
   
                                                    2022   
15:          Body weight         110.9 kg            Tello Masci DO  
Work Phone:   
[phone]                          [facility]  
   
                                                    2022   
15:                              Diastolic blood   
pressure                  81 mm[Hg]                 Tello Masci DO  
Work Phone:   
[phone]                          [facility]  
   
                                                    2022   
15:          Heart rate          93 /min             Tello Masci DO  
Work Phone:   
[phone]                          [facility]  
   
                                                    2022   
15:                              Systolic blood   
pressure                  134 mm[Hg]                Tello Masci DO  
Work Phone:   
[phone]                          [facility]  
   
                                                    10-   
13:          Body temperature    97.39 [degF]        Júnior Ahuja MD  
Work Phone:   
[phone]                          [facility]  
   
                                                    10-   
13:          Body weight         112.95 kg           Júnior Ahuja MD  
Work Phone:   
[phone]                          [facility]  
   
                                                    10-   
13:                              Diastolic blood   
pressure                  56 mm[Hg]                 Júnior Ahuja MD  
Work Phone:   
[phone]                          [facility]  
   
                                                    10-   
13:          Heart rate          64 /min             Júnior Ahuja MD  
Work Phone:   
[phone]                          [facility]  
   
                                                    10-   
13:          Respiratory rate    16 /min             Júnior Ahuja MD  
Work Phone:   
[phone]                          [facility]  
   
                                                    10-   
13:                              Systolic blood   
pressure                  108 mm[Hg]                Júnior Ahuja MD  
Work Phone:   
[phone]                          [facility]  
   
                                                    10-   
10:          Body temperature    97.81 [degF]        Brenda Veraman-Gold PT  
Work Phone:   
[phone]                          [facility]  
   
                                                    10-   
10:                              Diastolic blood   
pressure                  58 mm[Hg]                 Brenda Veraman-Gold PT  
Work Phone:   
[phone]                          [facility]  
   
                                                    10-   
10:          Heart rate          62 /min             Brenda   
Halderman-Gold PT  
Work Phone:   
[phone]                          [facility]  
   
                                                    10-   
10:          Respiratory rate    16 /min             Brenda   
Halderman-Gold PT  
Work Phone:   
[phone]                          [facility]  
   
                                                    10-   
10:                              SaO2% (BldA) [Mass   
fraction]                 99 %                      Brenda Jorgederman-Gold PT  
Work Phone:   
[phone]                          [facility]  
   
                                                    10-   
10:                              Systolic blood   
pressure                  112 mm[Hg]                Brenda   
Halderman-Gold PT  
Work Phone:   
[phone]                          [facility]  
   
                                                    10-   
09:          Body temperature    98.6 [degF]         Lalitha Pily PTA  
Work Phone:   
[phone]                          [facility]  
   
                                                    10-   
09:                              Diastolic blood   
pressure                  60 mm[Hg]                 Lalitha Pily PTA  
Work Phone:   
[phone]                          [facility]  
   
                                                    10-   
09:          Heart rate          68 /min             Lalitha Pily PTA  
Work Phone:   
[phone]                          [facility]  
   
                                                    10-   
09:          Respiratory rate    18 /min             Lalitha Pily PTA  
Work Phone:   
[phone]                          [facility]  
   
                                                    10-   
09:                              SaO2% (BldA) [Mass   
fraction]                 99 %                      Lalitha Pily PTA  
Work Phone:   
[phone]                          [facility]  
   
                                                    10-   
09:                              Systolic blood   
pressure                  112 mm[Hg]                Lalitha Pily PTA  
Work Phone:   
[phone]                          [facility]  
   
                                                    10-   
14:          Body temperature    98.01 [degF]        Lalitha Pily PTA  
Work Phone:   
[phone]                          [facility]  
   
                                                    10-   
14:                              Diastolic blood   
pressure                  60 mm[Hg]                 Lalitha Pily PTA  
Work Phone:   
[phone]                          [facility]  
   
                                                    10-   
14:          Heart rate          64 /min             Lalitha Pily PTA  
Work Phone:   
[phone]                          [facility]  
   
                                                    10-   
14:          Respiratory rate    18 /min             Lalitha Pily PTA  
Work Phone:   
[phone]                          [facility]  
   
                                                    10-   
14:                              SaO2% (BldA) [Mass   
fraction]                 98 %                      Lalitha Pily PTA  
Work Phone:   
[phone]                          [facility]  
   
                                                    10-   
14:                              Systolic blood   
pressure                  110 mm[Hg]                Lalitha Pily PTA  
Work Phone:   
[phone]                          [facility]  
   
                                                    2022   
11:          Body temperature    98.2 [degF]         Neida Gutierrez PT  
Work Phone:   
[phone]                          [facility]  
   
                                                    2022   
11:                              Diastolic blood   
pressure                  60 mm[Hg]                 Neida Gutierrez PT  
Work Phone:   
[phone]                          [facility]  
   
                                                    2022   
11:          Heart rate          68 /min             Neida Gutierrez PT  
Work Phone:   
[phone]                          [facility]  
   
                                                    2022   
11:          Respiratory rate    16 /min             Neida Gutierrez PT  
Work Phone:   
[phone]                          [facility]  
   
                                                    2022   
11:                              SaO2% (BldA) [Mass   
fraction]                 100 %                     Neida Corralox PT  
Work Phone:   
[phone]                          [facility]  
   
                                                    2022   
11:                              Systolic blood   
pressure                  120 mm[Hg]                Neida Gutierrez PT  
Work Phone:   
[phone]                          [facility]  
   
                                                    2022   
09:          Body temperature    97.39 [degF]        Lalitha Pily PTA  
Work Phone:   
[phone]                          [facility]  
   
                                                    2022   
09:                              Diastolic blood   
pressure                  64 mm[Hg]                 Lalitha Pily PTA  
Work Phone:   
[phone]                          [facility]  
   
                                                    2022   
09:          Heart rate          63 /min             Lalitha Pily PTA  
Work Phone:   
[phone]                          [facility]  
   
                                                    2022   
09:          Respiratory rate    18 /min             Lalitha Pily PTA  
Work Phone:   
[phone]                          [facility]  
   
                                                    2022   
09:                              SaO2% (BldA) [Mass   
fraction]                 97 %                      Lalitha Pily PTA  
Work Phone:   
[phone]                          [facility]  
   
                                                    2022   
09:                              Systolic blood   
pressure                  112 mm[Hg]                Lalitha Pily PTA  
Work Phone:   
[phone]                          [facility]  
   
                                                    2022   
10:                              Diastolic blood   
pressure                  60 mm[Hg]                 Brenda Hernandez PT  
Work Phone:   
[phone]                          [facility]  
   
                                                    2022   
10:          Heart rate          78 /min             Brenda Hernandez PT  
Work Phone:   
[phone]                          [facility]  
   
                                                    2022   
10:          Respiratory rate    18 /min             Brenda Hernandez PT  
Work Phone:   
[phone]                          [facility]  
   
                                                    2022   
10:                              SaO2% (BldA) [Mass   
fraction]                 99 %                      Brenda Hernandez PT  
Work Phone:   
[phone]                          [facility]  
   
                                                    2022   
10:                              Systolic blood   
pressure                  108 mm[Hg]                Brenda Hernandez PT  
Work Phone:   
[phone]                          [facility]  
   
                                                    2022   
09:          Body temperature    97.9 [degF]         Brenda Hernandez PT  
Work Phone:   
[phone]                          [facility]  
   
                                                    2022   
14:          Body temperature    97.39 [degF]        Lalitha Pily PTA  
Work Phone:   
[phone]                          [facility]  
   
                                                    2022   
14:                              Diastolic blood   
pressure                  68 mm[Hg]                 Lalitha Pily PTA  
Work Phone:   
[phone]                          [facility]  
   
                                                    2022   
14:          Heart rate          76 /min             Lalitha Pily PTA  
Work Phone:   
[phone]                          [facility]  
   
                                                    2022   
14:          Respiratory rate    18 /min             Lalitha Pily PTA  
Work Phone:   
[phone]                          [facility]  
   
                                                    2022   
14:                              SaO2% (BldA) [Mass   
fraction]                 99 %                      Lalitha Pily PTA  
Work Phone:   
[phone]                          [facility]  
   
                                                    2022   
14:                              Systolic blood   
pressure                  118 mm[Hg]                Lalitha Pily PTA  
Work Phone:   
[phone]                          [facility]  
   
                                                    2022   
02:          Body temperature    97.3 [degF]         Brenda Hernandez PT  
Work Phone:   
[phone]                          [facility]  
   
                                                    2022   
02:                              Diastolic blood   
pressure                  50 mm[Hg]                 Brenda Hernandez PT  
Work Phone:   
[phone]                          [facility]  
   
                                                    2022   
02:          Heart rate          55 /min             Brenda Hernandez PT  
Work Phone:   
[phone]                          [facility]  
   
                                                    2022   
02:          Respiratory rate    16 /min             Brenda Hernandez PT  
Work Phone:   
[phone]                          [facility]  
   
                                                    2022   
02:                              SaO2% (BldA) [Mass   
fraction]                 99 %                      Brenda Hernandez PT  
Work Phone:   
[phone]                          [facility]  
   
                                                    2022   
02:                              Systolic blood   
pressure                  100 mm[Hg]                Brenda Hernandez PT  
Work Phone:   
[phone]                          [facility]  
   
                                                    2022   
14:          Body temperature    98.2 [degF]         Lalitha Pily PTA  
Work Phone:   
[phone]                          [facility]  
   
                                                    2022   
14:                              Diastolic blood   
pressure                  78 mm[Hg]                 Lalitha Pily PTA  
Work Phone:   
[phone]                          [facility]  
   
                                                    2022   
14:          Heart rate          78 /min             Lalitha Pily PTA  
Work Phone:   
[phone]                          [facility]  
   
                                                    2022   
14:          Respiratory rate    18 /min             Lalitha Pily PTA  
Work Phone:   
[phone]                          [facility]  
   
                                                    2022   
14:                              SaO2% (BldA) [Mass   
fraction]                 97 %                      Lalitha Pily PTA  
Work Phone:   
[phone]                          [facility]  
   
                                                    2022   
14:                              Systolic blood   
pressure                  134 mm[Hg]                Lalitha Pily PTA  
Work Phone:   
[phone]                          [facility]  
   
                                                    2022   
10:          Body temperature    97.81 [degF]        Lalitha Pily PTA  
Work Phone:   
[phone]                          [facility]  
   
                                                    2022   
10:                              Diastolic blood   
pressure                  70 mm[Hg]                 Lalitha Pily PTA  
Work Phone:   
[phone]                          [facility]  
   
                                                    2022   
10:          Heart rate          56 /min             Lalitha Pily PTA  
Work Phone:   
[phone]                          [facility]  
   
                                                    2022   
10:          Respiratory rate    18 /min             Lalitha Pily PTA  
Work Phone:   
[phone]                          [facility]  
   
                                                    2022   
10:                              SaO2% (BldA) [Mass   
fraction]                 98 %                      Lalitha Wolfion PTA  
Work Phone:   
[phone]                          [facility]  
   
                                                    2022   
10:                              Systolic blood   
pressure                  126 mm[Hg]                Lalitha Wolfion PTA  
Work Phone:   
[phone]                          [facility]  
   
                                                    2022   
10:          Body temperature    97.3 [degF]         Brenda   
Halderman-Gold PT  
Work Phone:   
[phone]                          [facility]  
   
                                                    2022   
10:                              Diastolic blood   
pressure                  60 mm[Hg]                 Brenda   
Halderman-Gold PT  
Work Phone:   
[phone]                          [facility]  
   
                                                    2022   
10:          Heart rate          68 /min             Brenda   
Halderman-Gold PT  
Work Phone:   
[phone]                          [facility]  
   
                                                    2022   
10:          Respiratory rate    18 /min             Brenda   
Halderman-Gold PT  
Work Phone:   
[phone]                          [facility]  
   
                                                    2022   
10:                              SaO2% (BldA) [Mass   
fraction]                 99 %                      Brenda   
Halderman-Gold PT  
Work Phone:   
[phone]                          [facility]  
   
                                                    2022   
10:                              Systolic blood   
pressure                  110 mm[Hg]                Brenda   
Halderman-Gold PT  
Work Phone:   
[phone]                          [facility]  
   
                                                    2022   
15:          Body temperature    97.9 [degF]         Brenda   
Halderman-Gold PT  
Work Phone:   
[phone]                          [facility]  
   
                                                    2022   
15:                              Diastolic blood   
pressure                  64 mm[Hg]                 Brenda   
Halderman-Gold PT  
Work Phone:   
[phone]                          [facility]  
   
                                                    2022   
15:          Heart rate          73 /min             Brenda   
Halderman-Gold PT  
Work Phone:   
[phone]                          [facility]  
   
                                                    2022   
15:          Respiratory rate    18 /min             Brendagomez Jorgederman-Gold PT  
Work Phone:   
[phone]                          [facility]  
   
                                                    2022   
15:                              SaO2% (BldA) [Mass   
fraction]                 99 %                      Brenda   
David PT  
Work Phone:   
[phone]                          [facility]  
   
                                                    2022   
15:                              Systolic blood   
pressure                  112 mm[Hg]                Brenda Hernandez PT  
Work Phone:   
[phone]                          [facility]  
   
                                                    2022   
09:          Body temperature    97.59 [degF]        Júnior Ahuja MD  
Work Phone:   
[phone]                          [facility]  
   
                                                    2022   
09:          Body weight         113.4 kg            Júnior Ahuja MD  
Work Phone:   
[phone]                          [facility]  
   
                                                    2022   
09:                              Diastolic blood   
pressure                  66 mm[Hg]                 Júnior Ahuja MD  
Work Phone:   
[phone]                          [facility]  
   
                                                    2022   
09:          Heart rate          60 /min             Júnior Ahuja MD  
Work Phone:   
[phone]                          [facility]  
   
                                                    2022   
09:          Respiratory rate    16 /min             Júnior Ahuja MD  
Work Phone:   
[phone]                          [facility]  
   
                                                    2022   
09:                              Systolic blood   
pressure                  130 mm[Hg]                Júnior Ahuja MD  
Work Phone:   
[phone]                          [facility]  
   
                                                    2022   
12:                              Diastolic blood   
pressure                  76 mm[Hg]                 Mi Nurse  
Work Phone:   
[phone]                          [facility]  
   
                                                    2022   
12:          Heart rate          62 /min             Mi Nurse  
Work Phone:   
[phone]                          [facility]  
   
                                                    2022   
12:                              Systolic blood   
pressure                  128 mm[Hg]                Mi Nurse  
Work Phone:   
[phone]                          [facility]  
  
  
  
Encounters  
  
  
                          Encounter Date Encounter Type Care Provider Facility  
   
                          Start: 2023 Telephone encounter Neelima Briones RN   
Gastroenterology  
  
  
  
                                          
   
                                          
   
                                          
   
                                          
   
                                          
   
                                          
   
                                          
   
                                          
   
                                          
   
                                          
   
                                          
   
                                          
   
                                          
   
                                          
   
                                          
   
                                          
   
                                          
   
                                          
   
                                          
   
                                          
   
                                          
   
                                          
   
                                          
   
                                          
   
                                          
   
                                          
   
                                          
   
                                          
   
                                          
   
                                          
   
                                          
   
                                          
   
                                          
   
                                          
   
                                          
   
                                          
   
                                          
   
                                          
   
                                          
   
                                          
   
                                          
   
                                          
   
                                          
   
                                          
   
                                          
   
                                          
   
                                          
   
                                          
   
                                          
   
                                          
   
                                          
   
                                          
   
                                          
   
                                          
   
                                          
   
                                          
   
                                          
   
                                          
   
                                          
   
                                          
   
                                          
   
                                          
   
                                          
   
                                          
   
                                          
   
                                          
   
                                          
   
                                          
   
                                          
   
                                          
   
                                          
   
                                          
   
                                          
   
                                          
   
                                          
   
                                          
   
                                          
   
                                          
   
                                          
   
                                          
   
                                          
   
                                          
   
                                          
   
                                          
   
                                          
   
                                          
   
                                          
   
                                          
   
                                          
   
                                          
   
                                          
   
                                          
   
                                          
   
                                          
   
                                          
   
                                          
   
                                          
   
                                          
   
                                          
   
                                          
   
                                          
   
                                          
   
                                          
   
                                          
   
                                          
   
                                          
   
                                          
   
                                          
   
                                          
   
                                          
   
                                          
   
                                          
   
                                          
   
                                          
   
                                          
   
                                          
   
                                          
   
                                          
   
                                          
   
                                          
   
                                          
   
                                          
   
                                          
   
                                          
   
                                          
   
                                          
   
                                          
   
                                          
   
                                          
   
                                          
   
                                          
   
                                          
   
                                          
   
                                          
   
                                          
   
                                          
   
                                          
   
                                          
   
                                          
   
                                          
   
                                          
   
                                          
   
                                          
   
                                          
   
                                          
   
                                          
   
                                          
   
                                          
   
                                          
   
                                          
   
                                          
   
                                          
   
                                          
   
                                          
   
                                          
   
                                          
   
                                          
   
                                          
   
                                          
   
                                          
   
                                          
   
                                          
   
                                          
   
                                          
   
                                          
   
                                          
   
                                          
   
                                          
   
                                          
   
                                          
   
                                          
   
                                          
   
                                          
   
                                          
   
                                          
   
                                          
   
                                          
   
                                          
   
                                          
   
                                          
   
                                          
   
                                          
   
                                          
   
                                          
   
                                          
   
                                          
   
                                          
   
                                          
   
                                          
   
                                          
   
                                          
   
                                          
   
                                          
   
                                          
   
                                          
   
                                          
   
                                          
   
                                          
   
                                          
   
                                          
   
                                          
   
                                          
   
                                          
   
                                          
   
                                          
   
                                          
   
                                          
   
                                          
   
                                          
   
                                          
   
                                          
   
                                          
   
                                          
   
                                          
   
                                          
   
                                          
   
                                          
   
                                          
   
                                          
   
                                          
   
                                          
   
                                          
  
  
  
Procedures  
  
  
                          Date         Procedure    Procedure Detail Performing   
Clinician  
   
                                        Start: 10-   Mri abdomen w/o &   
w/contrast material                                 Marin Fish MD  
Work Phone:   
[phone]  
   
                          Start: 2023 Colonoscopy               [facility]  
   
                                        Start: 2023   Mri abdomen w/o &   
w/contrast material                                 Maurizio Cochran PA-C  
Work Phone:   
[phone]  
   
                                        Start: 2022   Us abdominal real ti  
me   
w/image limited                                     Tello A Tanneri DO  
Work Phone:   
[phone]  
  
  
  
Plan of Treatment  
  
  
                          Date         Care Activity Detail       Author  
   
                          Start: 2027 Urine microalbumin profile            
    [facility]  
  
  
  
                                          
   
                                          
   
                                          
   
                                          
   
                                          
   
                                          
   
                                          
   
                                          
   
                                          
   
                                          
   
                                          
   
                                          
   
                                          
   
                                          
   
                                          
   
                                          
   
                                          
   
                                          
   
                                          
   
                                          
   
                                          
   
                                          
   
                                          
   
                                          
   
                                          
   
                                          
   
                                          
   
                                          
   
                                          
   
                                          
   
                                          
   
                                          
   
                                          
   
                                          
   
                                          
   
                                          
   
                                          
   
                                          
   
                                          
   
                                          
   
                                          
   
                                          
   
                                          
   
                                          
   
                                          
   
                                          
   
                                          
   
                                          
   
                                          
   
                                          
   
                                          
   
                                          
   
                                          
   
                                          
   
                                          
   
                                          
   
                                          
   
                                          
   
                                          
   
                                          
   
                                          
   
                                          
   
                                          
   
                                          
   
                                          
   
                                          
   
                                          
   
                                          
   
                                          
   
                                          
   
                                          
   
                                          
   
                                          
   
                                          
   
                                          
   
                                          
   
                                          
   
                                          
   
                                          
   
                                          
   
                                          
   
                                          
   
                                          
   
                                          
   
                                          
   
                                          
   
                                          
   
                                          
   
                                          
   
                                          
   
                                          
   
                                          
   
                                          
   
                                          
   
                                          
   
                                          
   
                                          
   
                                          
   
                                          
   
                                          
   
                                          
   
                                          
   
                                          
   
                                          
   
                                          
   
                                          
   
                                          
   
                                          
   
                                          
   
                                          
   
                                          
   
                                          
   
                                          
   
                                          
   
                                          
   
                                          
   
                                          
   
                                          
   
                                          
   
                                          
   
                                          
   
                                          
   
                                          
   
                                          
   
                                          
   
                                          
   
                                          
   
                                          
   
                                          
   
                                          
   
                                          
   
                                          
   
                                          
   
                                          
   
                                          
   
                                          
   
                                          
   
                                          
   
                                          
   
                                          
   
                                          
   
                                          
   
                                          
   
                                          
   
                                          
   
                                          
   
                                          
   
                                          
   
                                          
   
                                          
   
                                          
   
                                          
   
                                          
   
                                          
   
                                          
   
                                          
   
                                          
   
                                          
   
                                          
   
                                          
   
                                          
   
                                          
   
                                          
   
                                          
   
                                          
   
                                          
   
                                          
   
                                          
   
                                          
   
                                          
   
                                          
  
  
  
Immunizations  
  
  
                      Immunization Date Immunization Notes      Care Provider Fa  
cili  
   
                                        10-          influenza, high dose  
   
seasonal,   
preservative-free                                   Júnior Ahuja MD  
Work Phone:   
[phone]                          [facility] [facility]  
Work Phone:   
[phone]  
   
                                        10-          influenza virus vacc  
ine,   
unspecified formulation                             [facility]  
Work Phone:   
[phone]                          [facility]  
   
                                        2021          COVID-19 vaccine, ag  
e   
12+ yr (PFIZER-BIONTECH   
- PURPLE TOP)                                       Mi Nurse  
Work Phone:   
[phone]                          [facility]  
Work Phone:   
[phone]  
   
                                        2021          influenza (aIIV4)   
vaccine, age 65+ yr,   
quadrivalent, PF (FLUAD   
QUADRIVALENT)                                       [facility]  
Work Phone:   
[phone]                          [facility]  
Work Phone:   
[phone]  
   
                                        2021          COVID-19 vaccine, ag  
e   
12+ yr (PFIZER-BIONTECH   
- PURPLE TOP)                                       Mi Nurse  
Work Phone:   
[phone]                          [facility]  
Work Phone:   
[phone]  
   
                                        2021          COVID-19 vaccine, ag  
e   
12+ yr (PFIZER-BIONTECH   
- PURPLE TOP)                                       Mi Nurse  
Work Phone:   
[phone]                          [facility]  
Work Phone:   
[phone]  
   
                                        2020          influenza, high dose  
   
seasonal,   
preservative-free                                   [facility]  
Work Phone:   
[phone]                          [facility]  
Work Phone:   
[phone]  
   
                                        2019          influenza, high dose  
   
seasonal,   
preservative-free                                   [facility]  
Work Phone:   
[phone]                          [facility]  
   
                                        10-          influenza, seasonal,  
   
injectable, preservative   
free                                                [facility]  
Work Phone:   
[phone]                          [facility]  
Work Phone:   
[phone]  
   
                                        2018          zoster vaccine   
recombinant                                         [facility]  
Work Phone:   
[phone]                          [facility]  
Work Phone:   
[phone]  
   
                                        2018          influenza, high dose  
   
seasonal,   
preservative-free                                   [facility] Nurse  
Work Phone:   
[phone]                          [facility]  
   
                                        2018          zoster vaccine   
recombinant                                         [facility] Nurse  
Work Phone:   
[phone]                          [facility]  
Work Phone:   
[phone]  
   
                                        2018          pneumococcal   
polysaccharide vaccine,   
23 valent                                           Júnior Ahuja MD  
Work Phone:   
[phone]                          [facility]  
Work Phone:   
[phone]  
   
                                        2017          influenza, high dose  
   
seasonal,   
preservative-free                                   [facility] Nurse  
Work Phone:   
[phone]                          [facility]  
   
                                        2017          pneumococcal conjuga  
te   
vaccine, 13 valhunter Ahuja MD  
Work Phone:   
[phone]                          [facility]  
Work Phone:   
[phone]  
   
                                        2017          tetanus and diphther  
ia   
toxoids, adsorbed,   
preservative free, for   
adult use (5 Lf of   
tetanus toxoid and 2 Lf   
of diphtheria toxoid)                               [facility]  
Work Phone:   
[phone]                          [facility]  
   
                                        2017          tetanus toxoid, redu  
gloria   
diphtheria toxoid, and   
acellular pertussis   
vaccine, adsorbed                                   Mi Nurse  
Work Phone:   
[phone]                          [facility]  
Work Phone:   
[phone]  
   
                                        10-          influenza, high dose  
   
seasonal,   
preservative-free                                   [facility] Nurse  
Work Phone:   
[phone]                          [facility]  
   
                                        10-          pneumococcal conjuga  
te   
vaccine, 13 valhunter Ahuja MD  
Work Phone:   
[phone]                          [facility]  
Work Phone:   
[phone]  
   
                                        2015          pneumococcal conjuga  
te   
vaccine, 13 valent                                  [facility] Nurse  
Work Phone:   
[phone]                          [facility]  
   
                                        2014          influenza, seasonal,  
   
injectable                                          Mi Nurse  
Work Phone:   
[phone]                          [facility]  
   
                                        2013          influenza virus vacc  
ine,   
unspecified formulation                             [facility] Nurse  
Work Phone:   
[phone]                          [facility]  
   
                                        10-          influenza virus vacc  
ine,   
unspecified formulation                             Mi Nurse  
Work Phone:   
[phone]                          [facility]  
Work Phone:   
[phone]  
   
                                        2009          influenza virus vacc  
ine,   
unspecified formulation                             [facility]  
Work Phone:   
[phone]                          [facility]  
Work Phone:   
[phone]  
   
                                        2007          influenza virus vacc  
ine,   
unspecified formulation                             [facility]  
Work Phone:   
[phone]                          [facility]  
   
                                        2007          tetanus and diphther  
ia   
toxoids, adsorbed,   
preservative free, for   
adult use (2 Lf of   
tetanus toxoid and 2 Lf   
of diphtheria toxoid)                               Mi Nurse  
Work Phone:   
[phone]                          [facility]  
   
                                        2006          influenza virus vacc  
ine,   
unspecified formulation                             [facility]  
Work Phone:   
[phone]                          [facility]  
Work Phone:   
[phone]  
   
                                        2006          pneumococcal   
polysaccharide vaccine,   
23 valent                                           [facility] Nurse  
Work Phone:   
[phone]                          [facility]  
Work Phone:   
[phone]  
   
                                        11-          influenza virus vacc  
ine,   
unspecified formulation                             [facility]  
Work Phone:   
[phone]                          [facility]  
Work Phone:   
[phone]  
  
  
  
Payers  
  
  
                          Date         Payer Category Payer        Policy ID  
   
                                2019      Unknown         [employer]  
 SUPPLEMENT   
[id number]   
2019-Present   
[phone] [address]   
[place] [address]   
Indemnity                               [id number]   
[id number]  
[id number]  
   
                                2019      Unknown         [employer]  
 SUPPLEMENT   
[id number]   
2019-Present   
[phone] [address]   
[place] [address]   
Indemnity                               [id number]  
[id number]  
   
                          11-   Unknown                   [id number]  
   
                          2005   Medicare                    
   
                                2005      Medicare        MEDICARE MEDICAR  
[facility]   
[id number] 2005-Present   
[phone]  [address]    
[place] [address]   
Medicare                                [id number]   
[id number]  
[id number]  
   
                          2005   Medicare                  [id number]  
   
                                       Medicare                  [id number]  
  
  
  
Social History  
  
  
                          Date         Type         Detail       Facility  
   
                                                            Tobacco smoking stat  
[facility]                      Never smoked tobacco      [facility]  
Work Phone:   
[phone]  
   
                                                    Start: 2022  
End: 2023           Alcohol intake            Current non-drinker   
of alcohol (finding)                    [facility]  
   
                                                    Start: 2022  
End: 2023                         History SDOH Alcohol   
Frequency                 1                         [facility]  
   
                                                    Start: 2022  
End: 2023                         History SDOH Social   
Connections Phone         5                         [facility]  
   
                                                    Start: 2022  
End: 2023                         History SDOH Social   
Connections Get Together  4                         [facility]  
   
                                                    Start: 2022  
End: 2023                         History SDOH Social   
Connections Christianity        3                         [facility]  
   
                          Start: 1940 Sex Assigned At Birth Not on file  C  
[facility]  
   
                                                    Start: 2022  
End: 2022                         Exposure to SARS-CoV-2   
(event)                   Not sure                  [facility]  
Work Phone:   
[phone]  
   
                                        Start: 2023   History SDOH Alcohol  
 Std   
Drinks                    0                         [facility]  
   
                                        Start: 2023   History SDOH Social   
Connections Get Together  2                         [facility]  
   
                                        Start: 2023   History SDOH Social   
Connections Membership    98                        [facility]  
   
                                                    Start: 2023  
End: 2023     History of Social function                     [facility]  
[facility]  
   
                                                    Start: 2023  
End: 2023                         Social connection and   
isolation panel                                     [facility]  
   
                                                            Do you belong to any  
 clubs   
or organizations such as   
Oriental orthodox groups, unions,   
fraternal or athletic   
groups, or school groups? Patient refused           [facility]  
   
                                                            Are you now ,  
   
, ,   
, never    
or living with a partner?                    [facility]  
   
                                                            How often to you hav  
e a   
drink containing alcohol? Never                     [facility]  
   
                                                            (I/We) worried wheth  
er   
(my/our) food would run   
out before (I/we) got   
money to buy more.        DK or Refused             [facility]  
   
                                                            In the past 12 month  
s, was   
there a time when you were   
not able to pay the   
mortgage or rent on time? No                        [facility]  
   
                                                            Do you belong to any  
 clubs   
or organizations such as   
Oriental orthodox groups, unions,   
fraternal or athletic   
groups, or school groups? Yes                       [facility]  
Work Phone:   
[phone]  
   
                                                            Are you now ,  
   
, ,   
, never    
or living with a partner?                    [facility]  
Work Phone:   
[phone]  
   
                                                            Do you feel stress -  
   
tense, restless, nervous,   
or anxious, or unable to   
sleep at night because   
your mind is troubled all   
the time - these days   
[OSQ]                     Not at all                [facility]  
Work Phone:   
[phone]  
  
  
  
Medical Equipment  
  
  
                                Procedure Code  Equipment Code  Equipment Origin  
al   
Text                      Equipment Identifier      Dates  
   
                                                                Felt Ptfe 1.2 Cm  
 X   
10 Cm - [id number]         [id number]                Start:   
10-  
  
  
  
                                          
   
                                          
   
                                          
  
  
  
Clinical Notes 2019 to 2023  
   Telephone Encounter - Neelima Briones RN - 2023 4:36 PM Juanito Bhagat MD - 10/19/2023 2:25 PM Margaret James RT(R) - 10/02/2023 10:00 AM 
Tello Beckwith DO - 2023 3:54 PM EDT  
  
                                Note Date & Type Note            Facility  
   
                                                    2023 Miscellaneous   
Notes                                   Formatting of this note might be   
different from the original.  
Attempted to reach the patient at   
the contact number that they   
provided [phone] (home) .   
Unable to speak with patient so   
without identifying the patient   
the following information was left   
on their voice mail: Date of   
procedure, location and report   
time  
Prep instructions  
A message was left informing the   
patient/patient representative   
they must have a responsible adult   
accompany them to their procedure;   
and remain in the endoscopy area   
until they are discharged. Failure   
to have a responsible adult   
accompany the patient to their   
procedure appointment prevents the   
use of sedation or anesthesia for   
their procedure; and can result in   
cancellation of the procedure  
NPO instructions were reviewed.  
Instructions to contact their   
primary care provider regarding   
their medications and which   
medications to stop in preparation   
for their procedure  
Instructions to completely read   
and follow the written   
instructions that they recieved   
regarding their procedure.  
Number to call with questions or   
concerns [phone]  
Number to call to cancel their   
procedure [phone]  
Neelima Briones RN  
  
Electronically signed by Neelima Briones RN at 2023 4:36 PM   
EST  
documented in this encounter            [facility]  
   
                                10- Note                 [facility]  
   
                                                    10- History of   
Present illness Narrative               Formatting of this note is   
different from the original.  
HCC Clinic New Patient  
Date of visit: 10/19/23  
  
CC: HCC treated with SBRT  
  
HPI: This is a 83 year old with   
medical history of MASH-related   
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   
complicated by HCC (within [place]   
treated with SBRT 2023), HE, PUD   
(bled ) here for follow-up  
  
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   
lesion, treated with SBRT   
(completed 2023)  
Most recent MRI 10/2 without new   
lesions and ecreased size of   
treated segment Keren lesion with   
enhancing internal septa (LR TR   
equivocal).  
  
He is doing well otherwise, no   
liver-related symptoms to report  
Has been having melenic stool for   
the last few weeks, also with some   
hematochezia. Has been requiring   
iron transfusions lately. Is more   
fatigued lately since this   
started. Patient does not want to   
pursue colonoscopy- had one in   
2023 with 1.4 cm TVA removed,   
EGD with small non-bleeding AVM   
which was cauterized  
  
Last clinic visit:  
Long-standing history of NAFLD  
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   
CÁRDENAS-related cirrhosis (given his   
risk factors) in 2022 on   
further workup of anemia. Saw   
hematology/oncology for this,   
eventually had liver imaging which   
noted a mass in 2022, recent   
MRI in April with cirrhosis along   
with a 4.1 cm HCC in seg 4A  
Case discussed at tumor board and   
he was referred for SBRT (limited   
options given age, functional   
limitations, etc)  
He met with radiation oncology   
just recently and is eager to get   
treatment started  
  
With respect to his liver-related   
symptoms:  
Sleeps a lot in the daytime, not   
so much at night. Possibly a bit   
more confusion when he talks.  
Has at least 4-5 soft bowel   
movements/day,  black as coal    
because he is taking iron   
supplements.  
No abdominal distention. Takes   
lasix for LE edema  
No jaundice, pruritis  
  
HCC history:  
Diagnosis date: 2022  
Initial AFP: 8.5  
Biopsy-proven?: no  
Imaging: MRI  
Within [place]: Yes  
CPT score: A  
ECOG performance status: 1  
  
Treatment history:  
LT candidate: no  
Resection: no  
Locoregional therapy: getting SBRT  
  
Review of Systems:  
See note  
  
Past Medical/surgical/family   
History: unchanged  
  
Social History:  
Tobacco: denies  
EtOH: denies  
Substances: denies  
  
I have confirmed and edited as   
necessary, the PFSH and ROS   
obtained by others.  
  
Current Outpatient Medications on   
File Prior to Visit  
Medication Sig  
oxybutynin ER (DITROPAN XL) 15 mg   
24 hr Extended Rel Tab Take 2   
tablets by mouth once daily.  
furosemide (LASIX) 20 mg tablet   
TAKE 1 TABLET BY MOUTH EVERY OTHER   
RDAY  
Diaper,Brief, Adult,Disposable   
(DEPEND EASY FIT UNDERGARMENTS) 1   
Each three times daily.  
ascorbic acid, vitamin C, (VITAMIN   
C) 500 mg tablet Take 1 tablet by   
mouth once daily.  
ascorbic acid-elderberry fruit   
100-50 mg chew Take 2 tablets by   
mouth once daily.  
cyanocobalamin (VITAMIN B-12)   
2,500 mcg tablet Take 2,500 mcg by   
mouth once daily.  
aspirin, enteric coated (ASPIRIN,   
ENTERIC COATED) 81 mg EC tablet   
Take 81 mg by mouth once daily.  
albuterol HFA (PROVENTIL HFA,   
VENTOLIN HFA) 90 mcg/actuation   
inhaler Inhale 2 Puffs as   
instructed every 4 hours as needed   
for wheezing/shortness of breath.  
omeprazole (PRILOSEC) 40 mg   
capsule Take 1 capsule by mouth   
once daily.  
allopurinol (ZYLOPRIM) 300 mg   
tablet Take 1 tablet by mouth once   
daily.  
magnesium oxide 400 mg magnesium   
cap Take 1 capsule by mouth once   
daily.  
fenofibrate nanocrystallized   
(TRICOR) 48 mg tablet Take 1   
tablet by mouth once daily.  
sertraline (ZOLOFT) 50 mg tablet   
Take 1 tablet by mouth once daily.  
nitroglycerin sublingual   
(NITROQUICK) 0.4 mg SL tablet   
Dissolve 1 tablet under the tongue   
every 5 minutes as needed for   
chest pain. FOR CHEST PAIN. IF NO   
RELIEF CALL 911  
fluticasone (FLONASE) 50   
mcg/actuation nasal spray Use 1   
Spray in each nostril once daily.  
ferrous sulfate 325 mg (65 mg   
iron) EC tablet Take 1 tablet by   
mouth twice daily with meals.   
(Patient taking differently: Take   
325 mg by mouth once daily.)  
fluticasone-vilanterol (BREO   
ELLIPTA) 200-25 mcg/dose inhaler   
Inhale 1 Inhalation as instructed   
once daily. Inhale one puff once   
daily. DO NOT CLICK OPEN UNTIL   
READY FOR DOSE  
multivitamin tablet Take 1 tablet   
by mouth once daily.  
torsemide (DEMADEX) 20 mg tablet   
Take 2 tablets by mouth once daily   
for 5 days.  
melatonin 10 mg tab Take 1 tablet   
by mouth as needed.  
amoxicillin (POLYMOX, AMOXIL) 500   
mg capsule Take four tablets, one   
hour prior to dental work (Patient   
not taking: Reported on 2023)  
  
No current facility-administered   
medications on file prior to   
visit.  
  
Vitals:  
/89[Pt denies headaches   
nausea and dizziness[   Pulse   
129[Afib[   Temp (Src) 97.8   
(Temporal)   Ht 5' 10  (1.78m)     
Wt 246 lb 9.6 oz (111.9kg)   SpO2   
98%   BMI 35.38 kg/(m^2).  
  
  
Physical Exam:  
Well appearing, in NAD  
No jaundice  
Aox3  
In a wheelchair  
  
Laboratory:  
MELD 3.0: 7 at 2023 10:07 AM  
MELD-Na: 8 at 2023 10:07 AM  
Calculated from:  
Serum Creatinine: 1.05 mg/dL at   
2023 10:07 AM  
Serum Sodium: 139 mmol/L (Using   
max of 137 mmol/L) at 2023   
10:07 AM  
Total Bilirubin: 0.5 mg/dL (Using   
min of 1 mg/dL) at 2023 10:07   
AM  
Serum Albumin: 3.5 g/dL at   
2023 10:07 AM  
INR(ratio): 1.1 at 2023 10:07   
AM  
Age at listing (hypothetical): 83   
years  
Sex: Male at 2023 10:07 AM  
  
Disclaimer: The MELD Calculator   
cannot calculate MELD scores for   
patients on dialysis.  
  
AFP  
Lab Results  
Component Value Date  
AFP <3.0 10/02/2023  
  
Imaging:  
MRI Liver 2022:  
Lesion#: 1  
Location: Segment Keren; (series 15   
image 19)  
Size (maximum long-axis   
dimension): 4.1 cm, Prior size:   
N/A  
Arterial Phase Hyperenhancement   
(APHE): Nonrim APHE  
Wash-out: Yes - non-peripheral  
Enhancing Capsule: Yes  
Threshold growth (50% or more   
growth in 6 months or less): N/A  
Ancillary features favoring   
malignancy or HCC: Fat in mass   
more than  
adjacent liver  
Ancillary features favoring   
benignity: N/A  
LI-RADS Category: LR-5 (definitely   
HCC)  
OPTN Class 5 Category:5B  
  
MRI Liver 10/2023  
  
Decreased size of treated segment   
Keren lesion with enhancing internal  
septa (LR TR equivocal).  
  
No new hepatic lesion that meets   
LR 5/OPTN criteria for   
hepatocellular  
carcinoma.  
  
Cirrhotic liver morphology with   
findings of portal hypertension,  
unchanged.  
  
Endoscopy:  
EGD 2023:  
Findings:  
A 5 cm hiatal hernia was present.  
A single 6 mm angiodysplastic   
lesion with no bleeding was found   
on  
the lesser curvature of the   
stomach. Fulguration to ablate the   
lesion  
by argon plasma at 1.3   
liters/minute and 35 flores was   
successful.  
Diffuse moderate inflammation   
characterized by erythema was   
found in  
the gastric body and in the   
gastric antrum. Biopsies were   
taken with  
a cold forceps for histology.  
Multiple 7 mm sessile polyps with   
no bleeding and no stigmata of  
recent bleeding were found in the   
stomach. This was biopsied with a  
cold forceps for histology.  
The first portion of the duodenum   
and second portion of the duodenum  
were normal.  
  
Assessment and plan:  
This is a 83 year old with medical   
history of MASH-related cirrhosis   
(MELD 3.0: 7) complicated by HCC   
(within [place] treated with SBRT   
2023), HE, PUD (bled ) here   
for follow-up  
Doing well from an HCC and   
liver-related standpoint without   
evidence of decompensation  
Has been having melenic stool   
lately. Discussed need for repeat   
EGD and colonoscopy but patient   
wants to defer repeat colonoscopy   
indefinitely  
Will send for EGD  
Repeat MRI 3 months  
  
RTC 3-4 months  
  
I spent a total of 45 minutes on   
the date of the service which   
included preparing to see the   
patient, face-to-face patient   
care, completing clinical   
documentation, obtaining and/or   
reviewing separately obtained   
history, performing a medically   
appropriate examination,   
counseling and educating the   
patient/family/caregiver, ordering   
medications, tests, or procedures,   
and communicating with other HCPs   
(not separately reported).  
  
Juanito Esparza MD  
Associate Staff, Department of   
Gastroenterology and Hepatology  
Digestive Disease and Surgery   
[place]  
Electronically signed by Juanito Esparza MD at 10/19/2023 3:43   
PM EDT  
documented in this encounter            [facility]  
   
                                10- Note                 [facility]  
   
                                10- Note                 [facility]  
   
                                                    10- History of   
Present illness Narrative               Formatting of this note is   
different from the original.  
Radiology Service Progress Note  
  
DATE OF SERVICE: 2023  
TIME: 10:47 AM  
  
PATIENT IDENTITY VERIFICATION   
COMPLETED USING TWO (2) STANDARD   
IDENTIFIERS: Name and Date of   
Birth confirmed by patient   
verbally.  
FALL SCREENING: Has the patient   
had 2 falls in the last year or 1   
fall with injury or currently   
using an Ambulatory Assistive   
Device (Walker, Cane, Wheelchair,   
Crutches, etc.)? Yes, Patient High   
Risk for Falls  
What interventions were put in   
place to prevent falls during this   
visit? Instructed Patient to Call   
for Help if Needed, Offered   
Assistance with   
Transfers/Clothing, Instructed   
Patient to Remain Seated (Not on   
Exam Table) Until Exam, and   
Increased Observations by   
Caregivers  
PATIENT GENDER DATA: Male  
PATIENT RELEVANT IMPLANT DATA   
REVIEWED: Yes  
ALLERGIES: Reviewed and unchanged  
CONTRAST ALLERGY: NO.  
  
EXAM: MRI - CONTRAST TYPE: GROUP   
II  
  
PERIPHERAL IV DATA: Ambulatory: A   
peripheral IV was started in the   
Right antecubital site with a   
Angio cath: 22 gauge.  
RADIOLOGY DEPARTMENT: MR; Exam(s)   
Completed: Body: Liver (routine)  
  
SIGNATURE: RT Larisa(R)   
PATIENT NAME: Mitesh Braun  
DATE: 2023 MRN:   
[id number]  
TIME: 10:47 AM  
  
Electronically signed by Margaret Reid RT(R) at 10/02/2023 10:47 AM   
EDT  
documented in this encounter            [facility]  
   
                                                    2023 Miscellaneous   
Notes                                   Formatting of this note might be   
different from the original.  
Referral form has been faxed to   
[facility] at number given below. Call to   
Dixie on [employer] and left vague   
message that referral form has   
been faxed to [facility]. Also called   
work number listed and left   
message as well notifying her with   
vague message.  
  
Carlee Monge Ma  
  
Electronically signed by Carlee Monge Ma at 2023 9:38 AM   
EDT  
Formatting of this note might be   
different from the original.  
Please place referral, fax to   
[phone]. [facility] called and they   
have not received anything and pt   
is calling them. Also please call   
pt once referral has been faxed.   
Pt concerned because he needs to   
be seen asap.  
  
Chantel Aldana LPN  
  
Electronically signed by Chantel Aldana LPN at 2023 9:23   
AM EDT  
Formatting of this note might be   
different from the original.  
Please fax referral  
Krista Perez APRN.CNP  
Electronically signed by Krista Perez APRN.CNP at 2023 9:12   
AM EDT  
Formatting of this note might be   
different from the original.  
Patient calls to check on status   
of request below. Patient reports   
wound is on left calf. Open area   
is the size of a quarter with   
yellowish green drainage. Redness   
around the wound is also about the   
width of another quarter. Patient   
not sure if wound is warm to   
touch. Denies fever.  
Declines appointment for eval with   
[facility].  
Patient wants to go straight to   
wound center.  
  
Melva Khalil RN  
  
Electronically signed by   
Melva Khalli RN at   
2023 10:09 AM EDT  
Formatting of this note might be   
different from the original.  
Pt's daughter Dixie calling back   
to report wound is on pt's calf,   
she is not sure which calf, states   
it is about nickel to quarter   
size. Started Tues - 2 days ago.   
Has yellow drainage with redness   
around the opening. Denies fever.  
  
Please advise if referral will be   
made to the wound center.  
  
María Chance LPN  
  
Electronically signed by María Chance LPN at 2023 8:50 AM EDT  
Formatting of this note might be   
different from the original.  
Left message for JUMA Colin Nurse   
to call with more info.  
Helen Zollinger LPN  
  
Electronically signed by   
Zollinger, Helen E LPN at   
2023 4:19 PM EDT  
Formatting of this note might be   
different from the original.  
Please call Dixie from [facility] for more information on   
this wound: location, etc.  
Krista Perez APRN.CNP  
Electronically signed by Krista Perez APRN.CNP at 2023 2:16   
PM EDT  
Formatting of this note might be   
different from the original.  
Cece with [facility]   
calls to report that Dixie with   
[facility] called for appt   
for pt to be seen for yellow   
drainage from wound. Cece reports   
that pcp has to send order.  
  
Fax order to [facility]:   
[phone].  
  
Yi Last LPN  
  
Electronically signed by   
Yi Last LPN at   
2023 1:56 PM EDT  
documented in this encounter            [facility]  
   
                                                    2023 Miscellaneous   
Notes                                   Formatting of this note might be   
different from the original.  
Delfino spoke with daughter Dixie.   
Discussed patient currently   
receives 30 hour a week home care   
[facility]. Patient also   
receives medical alert button   
courtesy of the [facility].  
Dixie notes that she is concerned   
regarding patient falls. Patient   
is adamant that he is not   
interested in going in to an AL.   
Dixie and Delfino discussed that the [facility]   
nurse that visits patient noted   
patient was not to the point of   
needing a guardian.  
Dixie and Delfino also discussed   
caregiver support groups to help   
support her with taking care of   
patient.  
Dixie and Delfino also discussed [facility]   
[facility] being an agency if patient   
is ever open to going to AL   
looking at options.  
Discussed also that patient can   
make choice to stay at home as he   
so chooses. Dixie notes that she   
understands that, but it is   
difficult with being concerned   
about number of falls.  
Delfino also noted that there is APS   
through [facility] for older adults if   
and when they should proceed to   
inability to care for self and   
have cognitive decline issues. Delfino   
is aware that patient is not to   
that point. Just wanting to   
educate daughter on services   
available in the community.  
Dixie notes that she will reach   
out to [facility] and see   
if they have any other service and   
supports that they could provide   
patient. Patient already receives   
[facility].   
This would just be for any extra   
service ideas such as support   
daughter as caregiver.  
Dixie thanked Delfino for community   
social service information.  
Electronically signed by   
Janine Yanez MSW at 2023   
1:08 PM EDT  
Formatting of this note might be   
different from the original.  
Delfino received message back from   
patient daughter requesting call   
back. Delfino called daughter back and   
left message that she will reach   
out and call daughter Dixie today   
@1pm.  
Electronically signed by   
Janine Yanez MSW at 2023   
9:33 AM EDT  
Formatting of this note might be   
different from the original.  
Sw left message for daughter Dixie   
to return Sw call to discuss   
community resources for patient   
care needs.  
Electronically signed by   
Janine Yanez MSW at 2023   
9:28 AM EDT  
Formatting of this note might be   
different from the original.  
Refer to MSW for options for more   
home care or support.  
Electronically signed by   
Júnior Ahuja MD at   
2023 11:18 PM EDT  
Formatting of this note might be   
different from the original.  
Pt's daughter calls to report that   
pt is falling more and forgetful   
more. Daughter reports pt fell   
three times in eight days and   
needed help to get up. Daughter   
reports pt does not want daughter   
to come to [facility] anymore because   
 it's none of her business .   
Daughter is concerned pt is not   
being truthful to dr. Daughter has   
tried to discuss Assisted Living   
with pt but pt is adamant that he   
is not leaving his house. Daughter   
reports she does have a caretaker   
come to pt's home for five hours   
six days a week but it is getting   
to the point where someone needs   
to be there more.  
Daughter is asking if pcp has any   
recommendations on what to do.  
Daughter is aware that pcp is out   
of the office this week.  
  
Yi Last LPN  
  
Electronically signed by   
Yi Last LPN at   
2023 1:27 PM EDT  
documented in this encounter            [facility]  
   
                                                    2023 Miscellaneous   
Notes                                   Formatting of this note might be   
different from the original.  
Detailed VM left on pt's daughter   
identified voicemail of   
information below.  
  
Chantel Aldana LPN  
  
  
Electronically signed by Chantel Aldana LPN at 2023 8:49   
AM EDT  
Formatting of this note is   
different from the original.  
Patient's request for medication   
is as follows  
Requested Prescriptions  
  
Signed Prescriptions Disp Refills  
dilTIAZem CD (CARDIZEM CD, CARTIA   
XT) 120 mg 24 hr capsule 90   
capsule 1  
Sig: Take 1 capsule by mouth once   
daily.  
Authorizing Provider: JÚNIOR AHUJA  
metoprolol tartrate, short acting,   
(LOPRESSOR) 25 mg tablet 180   
tablet 1  
Sig: Take 1 tablet by mouth twice   
daily.  
Authorizing Provider: JÚNIRO AHUJA  
  
Order entered - please phone   
pharmacy and notify patient.  
  
Júnior Ahuja MD  
Electronically signed by   
Júnior Ahuja MD at   
2023 10:18 AM EDT  
Formatting of this note is   
different from the original.  
Patient has been identified by   
name and date of birth: Yes,   
Provider Date Time  
Daughter phones for refill(s):  
Requested Prescriptions  
  
Pending Prescriptions Disp Refills  
dilTIAZem CD (CARDIZEM CD, CARTIA   
XT) 120 mg 24 hr capsule  
Sig: Take 1 capsule by mouth once   
daily.  
metoprolol tartrate, short acting,   
(LOPRESSOR) 25 mg tablet  
Sig: Take 1 tablet by mouth twice   
daily.  
  
Patient's daughter calling for   
refills. Says patient was in ER   
yesterday for his heart rate. She   
states he stopped taking   
medication due to no refills.  
She does not know if these were   
prescribed by PCP or [facility].  
  
Date of last office visit with   
pcp: 23  
Date of last office visit in   
primary care:  
Last 2 Encounter Wt Readings:  
Date: Wt:  
2023 103.4 kg (228 lb)  
2023 103.9 kg (229 lb)  
Previous labs/tests for   
medication: Blood Pressure:  
BUN (mg/dL)  
Date Value  
2023 27  
2022 28  
  
Sodium (mmol/L)  
Date Value  
2023 139  
2022 139  
Last 1 Encounter BP Readings:  
Date: BP:  
2023 102/60  
Please advise. Thank you. Lolis Cervantes, RN 22  
Electronically signed by Lolis Cervantes RN at 2023 3:13 PM   
EDT  
documented in this encounter            [facility]  
   
                                2023 Note                 [facility]  
   
                                                    2023 History of   
Present illness Narrative               Formatting of this note is   
different from the original.  
Hematologic problem(s):  
1) IgG kappa monoclonal gammopathy  
2) BREEZY.  
3) Liver mass.  
4) Hypercalcemia.  
5) Splenomegaly.  
6) Cirrhosis.  
  
HPI: The patient is an 83 yo male   
with a PMH significant for HTN,   
polycystic kidney disease (seeing   
nephrology for ~15 years), HLD,   
aortic repair (bovine valve), CAD,   
CAREN (CPAP), asthma, hypercalcemia,   
splenomegaly (noted on CT chest   
2014; spleen up to 18.4 cm in AP   
dimension; not enlarged on CT   
enterography ) and obesity.  
  
He had lab work ordered at his   
nephrologist office. Protein   
electrophoresis of the urine   
demonstrated no evidence of   
monoclonal spike. Urine protein   
creatinine ratio was elevated.   
Chemistry panel showed a   
creatinine of 1.33 mg/dL. Calcium   
mildly elevated 10.6 mg/dL.   
Vitamin D39.7 PTH 93.4 CBC   
significant for a total white   
count of 4900. No differential   
performed. Hemoglobin 9.0 g/dL.   
. Platelet count 79,000.   
Protein electrophoresis of the   
serum revealed a M spike but no   
immunofixation was performed.  
  
Chronic sensory neuropathy of the   
feet and left hand x10 years.   
Worse over time. He's never been   
given an answer as to why.  
  
Balance is off and he uses wheeled   
walker.  
  
L[place] alone [place]. Has   
home health aides for cleaning and   
washing clothes [facility].   
Daughter lives close by and he   
gets meals on wheels.  
  
Was having left posterior hip pain   
and got relief with Absorsene Jr.  
  
----------------------------------  
-  
  
He received 10 doses of iron   
sucrose.  
  
Presents for ongoing hematologic   
management.  
  
Interim history:  
He offers no complaints today.  
  
Diagnosed radiographic HCC per MRI   
obtained 2023 showing a 4.1   
cm tumor in segment 4A. Systemic   
staging, labs for classification   
(child Coffe[place], MELD) pending.   
Tentatively intermediate stage per   
BCLC, cT2 N0 MX stage II per AJCC.  
  
COURSE: definitive and SBRT   
(stereotactic body radiotherapy)  
AREA TREATED: Segment 4a liver   
HCC.  
Current dose: 3000 cGy in 1 fx  
Planned dose: 3000 cGy in 1 fx  
  
Had colonoscopy and EGD in 2023. A tubulovillous adenoma was   
removed from the sigmoid colon. On   
EGD biopsies of the stomach mucosa   
demonstrated gastric antral type   
mucosa with mild chronic inactive   
gastritis and reactive   
gastropathy. Fragments of gastric   
antral type mucosa with mild   
chronic inactive gastritis and   
reactive gastropathy. IHC for H.   
pylori was negative.  
  
Was found to be in atrial   
fibrillation when home nurse from   
[facility] picked up irregular heart beat   
on pulse ox check. No symptoms.   
Was taken to [facility] ED. he was   
advised to increase low-dose   
aspirin to 3 times daily--but I   
cannot find documentation of this   
after careful review of the   
[facility]   
electronic medical record. He is   
not on anticoagulation because of   
thrombocytopenia and cirrhosis.  
  
Back in ED yesterday at the   
request of the [facility] nurse due to   
variable heart rate. Was told in   
ED  in and out  of a fib.  
  
Sensory neuropathy symptoms   
subjectively stable.  
Previously seen by neurology group   
in [place].  
Was told nothing to be done for   
neuropathy.  
  
He has not had any black stools.   
He says occasionally he has some   
bleeding from hemorrhoids.  
  
PHYSICAL EXAM:  
Vitals: Blood pressure 108/63,   
pulse 70, temperature 36.6 C (97.9   
F), height 171 cm (5' 7.32 ),   
weight 109.8 kg (242 lb), SpO2 100   
%.  
Fatigued-appearing and in no acute   
distress.  
EYES: Sclerae are anicteric   
bilaterally.  
LYMPHATIC: There is no palpable   
cervical or supraclavicular   
adenopathy.  
RESPIRATORY: Inspiratory breath   
sounds are of normal intensity in   
all fields.  
CARDIOVASCULAR: Rhythm is regular   
today.  
ABDOMEN: The abdomen is obese but   
nondistended.  
Extremities: Bilateral lower   
extremity swelling. Right somewhat   
worse than left. Overlying   
pitting. Stable.  
SKIN: No jaundice.  
  
LABS:  
Component Latest Ref Rng & Units   
2022  
WBC 3.70 - 11.00 k/uL 5.22 4.65   
3.87  
RBC 4.20 - 6.00 m/uL 3.27 (L) 3.73   
(L) 3.63 (L)  
Hemoglobin 13.0 - 17.0 g/dL 10.9   
(L) 11.1 (L) 11.8 (L)  
Hematocrit 39.0 - 51.0 % 34.6 (L)   
36.0 (L) 36.2 (L)  
MCV 80.0 - 100.0 fL 105.8 (H) 96.5   
99.7  
MCH 26.0 - 34.0 pg 33.3 29.8 32.5  
MCHC 30.5 - 36.0 g/dL 31.5 30.8   
32.6  
RDW-CV 11.5 - 15.0 % 15.0 16.9 (H)   
20.0 (H)  
Platelet Count 150 - 400 k/uL 69   
(L) 98 (L) 66 (L)  
MPV 9.0 - 12.7 fL 12.2 11.4 9.8  
Neut% % 78.9 79.1  
Abs Neut (ANC) 1.45 - 7.50 k/uL   
3.67 3.06  
Lymph% % 10.8 11.1  
Abs Lymph 1.00 - 4.00 k/uL 0.50   
(L) 0.43 (L)  
Mono% % 5.6 6.2  
Abs Mono <0.87 k/uL 0.26 0.24  
Eosin% % 3.4 2.8  
Abs Eosin <0.46 k/uL 0.16 0.11  
Baso% % 0.9 0.5  
Abs Baso <0.11 k/uL 0.04 <0.03  
Immature Gran % % 0.4 0.3  
IMMATURE GRANS (ABS) <0.10 k/uL   
<0.03 <0.03  
NRBC /100 WBC 0.0 0.0  
Absolute nRBC <0.01 k/uL <0.01   
<0.01 <0.01  
DTYPE Auto Auto  
Iron 41 - 186 ug/dL 30 (L) 46  
TIBC 232 - 386 ug/dL 370 360  
Transferrin Saturation 15.0 - 57.0   
% 8.1 (L) 12.8 (L)  
Ferritin 30.3 - 565.7 ng/mL 24.8   
(L) 50.4  
  
Component Latest Ref Rng & Units   
2021  
Protein, Total 6.3 - 8.0 g/dL 6.4   
5.8 (L) 6.6  
Albumin 3.9 - 4.9 g/dL 3.8 (L) 3.5   
(L) 3.8 (L)  
Calcium 8.5 - 10.2 mg/dL 10.6 (H)   
10.8 (H) 10.8 (H)  
Bilirubin, Total 0.2 - 1.3 mg/dL   
0.5 0.4 0.5  
Alkaline Phosphatase 38 - 113 U/L   
62 85 111  
AST 14 - 40 U/L 31 30 35  
Glucose 74 - 99 mg/dL 103 (H) 108   
(H) 98  
BUN 9 - 24 mg/dL 26 (H) 33 (H) 27   
(H)  
Creatinine 0.73 - 1.22 mg/dL 1.16   
1.31 (H) 1.01  
Sodium 136 - 144 mmol/L 141 140   
135 (L)  
Potassium 3.7 - 5.1 mmol/L 4.8 5.5   
(H) 4.0  
Chloride 97 - 105 mmol/L 109 (H)   
108 (H) 103  
CO2 22 - 30 mmol/L 23 25 25  
Anion Gap 9 - 18 mmol/L 9 7 (L) 7   
(L)  
ALT 10 - 54 U/L 19 21 19  
eGFR-African American >60  
eGFR-All Other Races . >60  
eGFR >=60 mL/min/1.73m 54 (L) 74  
  
Component Latest Ref Rng & Units   
2022  
Cold Agglut, 4 degrees C <1:32   
Dilutions <1:32  
Cold Agglut, 37 degrees C   
Dilutions Test not indicated when   
titer is <1:32 at 4 degrees C.  
  
Component Latest Ref Rng & Units   
2022  
PT Sec <13.1 sec 10.9  
PT INR 0.9 - 1.3 1.1  
Pathologist Interpretation, CBCDIF   
Normocytic anemia with slight   
polychromasia . . .  
Pathologist (PASHA) Reviewed by   
Savanah Cole M.D., Ph.D  
APTT 23.0 - 32.4 sec 26.2  
Fibrinogen Ag 149 - 353 mg/dL 353  
  
Component Latest Ref Rng & Units   
2022  
IgG 700 - 1,600 mg/dL 1,120  
IgA 70 - 400 mg/dL 346  
IgM 40 - 230 mg/dL 79  
  
Component Latest Ref Rng & Units   
2022  
Albumin 3.43 - 5.41 g/dL 3.62  
Alpha 1 Globulin 0.18 - 0.43 g/dL   
0.32  
Alpha 2 Globulin 0.42 - 0.98 g/dL   
0.66  
Beta Globulin 0.61 - 1.17 g/dL   
0.87  
Gamma Globulin 0.53 - 1.51 g/dL   
1.12  
Interpretation (Prot Electro) No   
definitive M protein is identified   
on protein electrophoresis. An M   
protein is identified on protein   
electrophoresis. (A)  
Interpretation Comment for Protein   
Electrophoresis See separate   
immunofixation report for   
characterization of monoclonal   
gammopathy. . . .  
M-Protein Location Gamma Fraction   
1  
M-Protein Concentration <=0.00   
g/dL 0.30 (H)  
SPE Staff Review Reviewed by Sanaz Parrish MD  
  
Component Latest Ref Rng & Units   
2022  
Kappa Free, Serum 3.3 - 19.4 mg/L   
65.0 (H)  
Lambda Free, Serum 5.7 - 26.3 mg/L   
37.1 (H)  
K/L Ratio, Serum 0.26 - 1.65 1.75   
(H)  
  
IgG kappa on immunofixation of   
serum.  
  
Component Latest Ref Rng & Units   
2023  
PTH, Intact 15 - 65 pg/mL 69 (H)  
  
PATHOLOGY:  
Bone marrow biopsy done at [facility] 2023:  
Hypercellular bone marrow with   
trilineage hematopoiesis.  
No evidence of lymphoproliferative   
disorder or plasma cell dyscrasia.  
  
Flow cytometry did not reveal   
evidence of lymphoproliferative   
disorder. There was no monoclonal   
plasma cell neoplasm.  
  
Plasma cells reported at 2% on   
aspirate.  
Iron was reported as rare   
stainable iron.  
  
Fluorescence in situ hybridization   
for BCR ABL was negative.  
  
46 XY [20].  
  
IMAGING:  
US 2022:  
IMPRESSION:  
1. There is a new hyperechoic   
nodule in the right lobe of the   
liver  
which could be further evaluated   
by MRI  
2. Nodular contour of the liver   
which may be due to cirrhosis  
3. Splenomegaly  
  
Bone survey 2022:  
IMPRESSION:  
SEVERAL SUBCENTIMETER LUCENT AREAS   
IN THE CALVARIUM.  
  
DIFFUSE OSTEOPENIA.  
  
DEGENERATIVE CHANGES AS DESCRIBED.   
POSTOPERATIVE CHANGES AS   
DESCRIBED.  
  
NO ADDITIONAL FOCAL LYTIC OR   
BLASTIC ABNORMALITY IS   
APPRECIATED.  
  
ASSESSMENT/PLAN:  
(D47.2) MGUS (monoclonal   
gammopathy of unknown   
significance) (primary encounter   
diagnosis)  
(D47.2) IgG monoclonal gammopathy  
Assessment:  
-The patient is an 83-year-old   
male with a past medical history   
as outlined above. He has no   
history of diabetes but has   
longstanding worsening sensory   
neuropathy of the feet and left   
hand. He has polycystic kidney   
disease as well as chronic mild   
hypercalcemia with elevation of   
PTH and stable serum creatinine   
over time. Referred for possible   
serum monoclonal antibody on   
electrophoresis of the serum.   
Urine negative for monoclonal   
protein on electrophoresis.  
-Low-level IgG kappa monoclonal   
gammopathy.  
-Previous hypercalcemia secondary   
to hyperparathyroidism.  
-Bone survey revealed no lytic   
lesions.  
-Previously reviewed the results   
of the bone marrow biopsy in   
detail with the patient and his   
daughter. No evidence of   
smoldering myeloma or multiple   
myeloma.  
Plan:  
-Reassess in 6 months.  
  
(D50.0) Iron deficiency anemia due   
to chronic blood loss  
(D69.6) Thrombocytopenia (HCC)  
Assessment:  
-Responded very well to a course   
of parenteral iron.  
-No overt signs of GI bleeding.  
-He was not put on anticoagulation   
for atrial fibrillation due to   
thrombocytopenia.  
-Iron saturation low again.   
Ferritin decreasing.  
Plan:  
-Check B12, serum folate and TSH.  
-5 doses of iron sucrose.  
-Reassess 4 to 6 weeks after   
completing iron.  
  
(E21.3) Hyperparathyroidism (HCC)  
Assessment:  
-Serum PTH level elevated.  
Plan:  
-Defer management to PCP.  
  
(K74.69) Other cirrhosis of liver   
(HCC)  
Assessment:  
-Established with hepatology.  
-Diagnosed with HCC--received SBRT  
Plan:  
-Follow up with hepatology.  
  
Portions of this documentation   
were copied and pasted from   
previous office visit notes in   
order to provide a cohesive   
continuity of the history. The   
note has been reviewed and edited   
and updated as necessary.  
  
I spent a total of 30 minutes on   
the date of the service which   
included preparing to see the   
patient, face-to-face patient   
care, completing clinical   
documentation, obtaining and/or   
reviewing separately obtained   
history, performing a medically   
appropriate examination,   
counseling and educating the   
patient/family/caregiver, ordering   
medications, tests, or procedures,   
communicating with other HCPs (not   
separately reported), and   
communicating results to the   
patient/family/caregiver.  
  
Tello Ortega DO  
Electronically signed by Tello Ortega DO at 2023 4:34 PM   
EDT  
documented in this encounter            [facility]  
   
                                                    08- Miscellaneous   
Notes                                   Formatting of this note might be   
different from the original.  
Noted.  
Electronically signed by   
Júnior Ahuja MD at   
08/15/2023 12:31 PM EDT  
Formatting of this note might be   
different from the original.  
Ngoc TOLBERT with [facility] calls to let   
provider know that patient HR was   
out side of their parameters   
today.  and then 44. At end   
of session it was 68.   
Asymptomatic.  
  
Dr. Dotson's office also notified.   
Patient missed his appointment   
with Dr. Dotson last week and will   
reschedule.  
  
Melva Khalil RN  
  
Electronically signed by   
Melva Khalil RN at   
2023 11:44 AM EDT  
documented in this encounter            [facility]  
   
                                2023 Note                 [facility]  
   
                                                    2023 Miscellaneous   
Notes                                   Formatting of this note might be   
different from the original.  
José Antonio PT calling from [facility] to   
report plan of care for patient   
and physical therapy will visit   
patient 2 times a week for 2 weeks   
and 1 time a week for 1 week.   
Physical therapy will work with   
patient on functional mobility. No   
call back needed.  
  
Angela Pham RN  
  
Electronically signed by Angela Pham RN at 2023 3:57   
PM EDT  
documented in this encounter            [facility]  
   
                                                    2023 Miscellaneous   
Notes                                   Formatting of this note might be   
different from the original.  
[facility] notified.  
Electronically signed by Julia Shaffer Ma at 2023 3:58 PM EDT  
Formatting of this note might be   
different from the original.  
PCP agrees and will follow  
ALEXIA Roy.CNP  
Electronically signed by Krista Perez APRN.CNP at 2023 3:55   
PM EDT  
Formatting of this note might be   
different from the original.  
[facility] MELTON calling from [facility] to   
report plan of care for patient   
and SN will visit patient 1 times   
a week for five weeks. SN will   
work with patient on monitoring   
and education of new diagnosis of   
Afib.  
  
[facility] needs verbal order for POC.   
After receives verbal order 485   
form will be completed and faxed   
to provider at [phone].  
  
Please call verbal order to [facility]   
at [phone].  
  
Melva Khalil RN  
Electronically signed by   
Melva Khalil RN at   
2023 3:41 PM EDT  
documented in this encounter            [facility]  
   
                                                    2023 Miscellaneous   
Notes                                   Formatting of this note might be   
different from the original.  
Below response left on identified   
vm.  
Helen Zollinger LPN  
  
Electronically signed by   
Zollinger, Helen E LPN at   
2023 1:00 PM EDT  
Formatting of this note might be   
different from the original.  
I will follow HH.  
Electronically signed by   
Júnior Ahuja MD at   
2023 12:42 PM EDT  
Formatting of this note might be   
different from the original.  
Kimmy with [facility] calling. Patient   
discharging from [facility], diagnosis   
A-Fib, post-fall. Pt has orders   
for HH Nursing, PT and OT and   
asking if PCP willing to follow   
for these orders? They would like   
to start seeing patient tomorrow,   
on .  
  
Please call Kimmy back with   
approval at [phone].  
  
Lenore Lee RN  
  
Electronically signed by Lenore Lee RN at 2023 10:00 AM   
EDT  
documented in this encounter            [facility]  
   
                                                    2023 Miscellaneous   
Notes                                   Formatting of this note might be   
different from the original.  
Called and left a detailed   
voicemail notifying patient's   
daughter of providers message.   
Hospital phone number was left in   
case she had any questions.  
  
Nathalia Peterson RN  
  
Electronically signed by Nathalia Peterson RN at 2023 8:26 AM   
EDT  
Formatting of this note might be   
different from the original.  
Discuss at upcoming appointment   
later this month and for face to   
face requirement.  
Electronically signed by Júnior Ahuja MD at 2023 11:41   
PM EDT  
Formatting of this note might be   
different from the original.  
Dixie, pt's daughter called and   
would like to get an order for OT   
home health. Pt's mobility is   
decreasing. Daughter will check to   
see which Home Health Agency is   
covered by pt's insurance.. She   
will call back.  
  
Chantel Aldana LPN  
  
Electronically signed by Chantel Aldana LPN at 2023 4:35   
PM EDT  
documented in this encounter            [facility]  
   
                                                    2023 Miscellaneous   
Notes                                   Formatting of this note might be   
different from the original.  
Appeal letter for xifaxan faxed to   
[employer] appeal department for   
review.  
Electronically signed by Neno Bingham RN at 2023 1:03 PM   
EDT  
documented in this encounter            [facility]  
   
                                                    2023 Miscellaneous   
Notes                                   Formatting of this note might be   
different from the original.  
  
  
Spoke with patient: Yes  
Confirmed date scheduled and   
patient report time: Yes  
Procedure Planned:Colonoscopy with   
or without biopsies based on   
clinical findings  
Esophagogastroduodenoscopy(EGD)   
with or without biopies based on   
clinical findings, removal of   
polyps or lesions  
Is the patient on blood   
thinners?no  
Procedure Instructions given to   
patient: Yes, and they verbalized   
their understanding of   
instructions given  
Patient instructed to take   
prescribed preparation prior to   
procedure:Yes, and they verbalized   
their understanding of   
instructions given  
Patient instructed to have   
family/friend present for   
procedure transport   
home:Patient/patient   
representative was told that if   
they do not have a responsible   
adult accompany them to their   
procedure; and remain in the   
endoscopy area until they are   
discharged; that their procedure   
cannot be done with sedation or   
anesthesia and may be cancelled.   
and They verbalized their   
understanding and agree to have a   
responsible adult accompany the   
patient to their procedure and   
remain in the endoscopy area.  
Any barriers to Patient learning:   
Patient/Patient Representative   
responded appropriately on phone.  
Type of instruction given: Verbal   
by telephone contact.  
Trinidad Dumont LPN  
Electronically signed by Trinidad Dumont LPN at 2023 4:01 PM   
EDT  
documented in this encounter            [facility]  
   
                                2023 Note                 [facility]  
   
                                                    2023 History of   
Present illness Narrative               Formatting of this note might be   
different from the original.  
Q3 FRANSICO Triage Note  
  
Pt scheduled for upcoming   
endoscopic evaluation in Q3. Chart   
reviewed, no apparent   
contraindication at this time   
based on Q3 Indications for   
Anesthesia Consult and OR Cases.   
Final Anesthesia review and   
clearance will be performed on the   
day of the procedure, this note   
does note serve as procedural   
clearance.  
  
Seferino Barbosa PA-C  
  
Electronically signed by Seferino Barbosa PA-C at 2023 9:52   
AM EDT  
documented in this encounter            [facility]  
   
                                                    2023 Miscellaneous   
Notes                                   Formatting of this note is   
different from the original.  
ALEX Dixie/daughter, advised refill   
for Oxybutynin (Ditropan) was sent   
to [facility] [place], 3/2/2023 for   
180 tablets, 3 refills, 1 year   
supply.  
  
Dixie did not request refill for   
Lisinopril, does not want Patient   
taking, BP is already low and   
drops it lower. Only needing   
refill of Nitro.  
  
Patient has been identified by   
name and date of birth: Yes  
Dixie/daughter phones for   
refill(s):  
Requested Prescriptions  
  
Pending Prescriptions Disp Refills  
nitroglycerin sublingual   
(NITROQUICK) 0.4 mg SL tablet 25   
tablet 1  
Sig: Dissolve 1 tablet under the   
tongue every 5 minutes as needed   
for chest pain. FOR CHEST PAIN. IF   
NO RELIEF CALL 911  
  
Date of last office visit in   
primary care: 3/20/2023  
6 month follow-up: 2023  
Last 2 Encounter Wt Readings:  
Date: Wt:  
2023 103.9 kg (229 lb)  
2023 107.3 kg (236 lb 9.6   
oz)  
Previous labs/tests for   
medication: Blood Pressure:  
BUN (mg/dL)  
Date Value  
2023 27  
2022 28  
  
Sodium (mmol/L)  
Date Value  
2023 139  
2022 139  
Last 1 Encounter BP Readings:  
Date: BP:  
2023 121/64  
Please advise. Thank you. Helen Zollinger LPN  
  
Electronically signed by Helen E Zollinger LPN at 2023 2:56   
PM EDT  
Formatting of this note is   
different from the original.  
Patient was seen 3/20/23 by Dr. Ahuja and the note on the   
encounter for patient other refill   
request states he was never seen   
in their office but he was. He was   
also asking for lisinopril in the   
last request but no one ever   
called the patient or responded.   
Patient has been identified by   
name and date of birth: Yes  
  
Requested Prescriptions  
  
Pending Prescriptions Disp Refills  
nitroglycerin sublingual   
(NITROQUICK) 0.4 mg SL tablet 25   
tablet 1  
Sig: Dissolve 1 tablet under the   
tongue every 5 minutes as needed   
for chest pain. FOR CHEST PAIN. IF   
NO RELIEF CALL 911  
oxybutynin ER (DITROPAN XL) 15 mg   
24 hr Extended Rel Tab 180 tablet   
3  
Sig: Take 2 tablets by mouth once   
daily.  
  
RX INSTRUCTIONS:  
Patient aware RX will be sent to   
pharmacy. No need to notify   
patient.  
  
Vane Méndez Pss  
  
Electronically signed by Vane Méndez Pss at 2023 2:42 PM   
EDT  
documented in this encounter            [facility]  
   
                                2023 Note                 [facility]  
   
                                2023 Note                 [facility]  
   
                                                    2023 History of   
Present illness Narrative               Formatting of this note might be   
different from the original.  
MITESH BRAUN  
[id number]  
2023  
  
[facility]  
Department of Radiation Oncology  
[facility]  
  
RADIATION ONCOLOGY: COMPLETION   
NOTE  
  
DATE OF SIMULATION: 2023  
  
DATES OF TREATMENT: 2023 to   
2023  
  
TREATMENT MACHINE: [employer]  
  
TREATMENT AREA: Liver HCC  
  
DIAGNOSIS: 83 year old man with   
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  
  
CONCURRENT THERAPY: No.  
  
DELIVERED DOSE: The Liver HCC PTV   
received a total dose of 3000 cGy   
in 1 fractions at 3000   
cGy/fraction prescribed to Max   
Dose at 73.5% IDL using 10 MV   
photons with SBRT Coplanar VMAT   
technique. CBCT was used for IGRT.  
  
ELAPSED DAYS: 0  
  
TOLERANCE: At last on-treatment   
visit, his toxicity was summarized   
as excellent with no acute   
toxicities.  
  
RESPONSE: To be assessed in   
outpatient clinic.  
  
REMARKS: The patient will be seen   
again in follow up in 3-4 months   
with MRI liver + labs.  
  
Resident Physician  
Moises Kaplan M.D.  
  
Staff Physician  
Marin Fish M.D  
37:50 AM  
Electronically Signed  
  
cc:  
Júnior Ahuja  
[address] [place] [address]  
Phone: [phone]  
Fax: [phone]  
  
Dr. Ortega, CC  
  
  
Electronically signed by Marin Fish MD at 2023 7:50 AM   
EDT  
documented in this encounter            [facility]  
   
                                                    2023 Miscellaneous   
Notes                                   Formatting of this note might be   
different from the original.  
Pt never seen in our department  
Electronically signed by Bere Cali MA at 2023 10:54 AM   
EDT  
Formatting of this note is   
different from the original.  
Patient has been identified by   
name and date of birth: Yes  
  
Requested Prescriptions  
  
Pending Prescriptions Disp Refills  
lisinopril (ZESTRIL) 40 mg tablet   
30 tablet 11  
Sig: Take 1 tablet by mouth once   
daily.  
nitroglycerin sublingual   
(NITROQUICK) 0.4 mg SL tablet 25   
tablet 3  
Sig: Dissolve 1 tablet under the   
tongue as needed. FOR CHEST PAIN.   
IF NO RELIEF CALL 911  
oxybutynin ER (DITROPAN XL) 15 mg   
24 hr Extended Rel Tab 90 tablet 3  
Sig: Take 1 tablet by mouth once   
daily.  
  
RX INSTRUCTIONS:  
Patient aware RX will be sent to   
pharmacy. No need to notify   
patient.  
  
Miranda Bejarano  
Electronically signed by Miranda Bejarano at 2023 10:40 AM EDT  
documented in this encounter            [facility]  
   
                                2023 Note                 [facility]  
   
                                        2023 Nurse Note Formatting of this  
 note might be   
different from the original.  
Mitesh Braun is here for an   
appointment today with Nurse and   
experienced a fall during his   
clinical visit.  
Location of fall: [place]-, Exam   
rm 16  
  
Brief Factual Description: The   
patient experienced loss of   
balance while changing with the   
assistance from his daughter   
Dixie. Exam room door was closed.   
Daughter opened up the door to ask   
for help to get patient back into   
his w/c. Pt was found with both   
hands on the sink and kneeling on   
his left knee. Pt assisted back to   
his w/c. Pt denies hitting his   
head and denies any pain or   
discomfort. No injury to left   
knee. VS obtained and stable. Dr. Fish aware and into see   
patient and his daughter. Patient   
declined further evaluation and   
left unit by w/c with his daughter   
Dixie.  
Contributing Factors: lost balance   
and failure to call for assistance  
Did patient hit head or neck? No  
  
Is the patient having any pain? No  
  
Is patient alert? YES  
  
Injury: No Injury Noted  
  
INTERVENTIONS:  
  
Immediate Actions Taken: Provider   
notified  
Vitals completed  
  
Name of LIP Notified: Dr. Marin Fish  
Additional Prevention   
Measures:instructed Patient to   
remain seated, instructed pt. to   
call for help as needed, door to   
room left opened , offered   
assistance with   
transfers/clothing, and pt refused   
interventions/assistance.  
  
Electronically signed by Janet Arboleda LPN at 2023 3:44   
PM EDT  
documented in this encounter            [facility]  
   
                                                    2023 History of   
Present illness Narrative               Formatting of this note might be   
different from the original.  
  
Radiation Oncology Nursing Note  
  
PATIENT NAME: Mitesh Braun  
PATIENT MRN: [id number]  
  
[facility] FACILITY/LOCATION: Main   
[place]  
  
PROCEDURE: Contrast Injection for   
CT Simulation  
  
Safety Checks  
Patient identified using 2   
identifiers: Yes  
NPO x 4 hours verified: Yes  
Creatinine level drawn within the   
last 60 days: Yes  
Creatinine level within normal   
limits: Yes  
IV start: Time: 1355. #22g   
angiocath placed in the Left AC.   
Positive blood return verified:   
Yes  
Physician order for contrast   
injection verified: Yes  
Patient's allergies verified,   
including CT contrast: Yes  
Is the patient having any pain?   
None. 0 on a scale of 0 to 10.  
Concerns about physical or   
emotional abuse: No  
Fall risk: Yes, At risk due to:   
unsteady gait, weakness, and use   
of a wheelchair. LIP Notified.  
  
SIGNED by Janet Arboleda LPN  
  
Radiation Therapy - Patient   
Education Note  
  
PATIENT NAME: Mitesh Braun  
PATIENT MRN: [id number]  
  
May 5, 2023  
  
[facility] FACILITY/LOCATION: [facility]  
  
READINESS TO LEARN  
Cognitive Ability: Alert and   
oriented  
Motivation to learn: Eager  
Family Support: High - Very   
involved in pt care  
Instruction provide to: Patient   
and family member  
Patient learns best by: Individual   
Instruction  
Written Instruction - Hand-outs  
Verbal Instruction  
Factors effecting learning: None  
Physical limitations effecting   
learning: Limited Mobility  
  
LEARNING RESPONSE  
Diagnosis: Pt simulated today for   
radiation therapy to abdomen.  
Education Topic/Teaching Points:   
Radiation therapy, Side effects,   
and OTV:  
Method of instruction: Individual   
instruction  
Written instruction - handouts  
Verbal instruction  
Patient /Family response: Patient   
and family verbalized   
understanding of radiation   
treatments, side effects, OTV, and   
transportation.  
Follow-up plan: Patient instructed   
to call with any further issues  
Supplemental material:   
Informational handouts on SBRT   
Liver Handout.  
Referral (recommendation): None,   
Pt denied need for social work,   
van service, and dietician.  
  
Was  approved? Clinical   
questionnaires incomplete due to   
Patient declined to complete or   
answer questions with nurse  
  
Signed by: Janet Arboleda LPN  
Electronically signed by Janet Arboleda LPN at 2023 2:29   
PM EDT  
documented in this encounter            [facility]  
   
                                2023 Note                 [facility]  
   
                                2023 Note                 [facility]  
   
                                2023 Note                 [facility]  
   
                                                    2023 History of   
Present illness Narrative               Formatting of this note might be   
different from the original.  
MITESH BRAUN.  
[id number]  
2023  
  
[facility]  
Department of Radiation Oncology  
[facility]  
  
RADIATION ONCOLOGY - Therapist   
Injection Note  
  
  
Procedure: Contrast Injection for   
CT Simulation  
  
Safety Checks:  
Patient identified using two   
identifiers: Yes Physician order   
for contrast injection verified:   
Yes Patient's allergies verified,   
including CT contract: Yes eGFR   
verified: Yes  
Omnipaque:  300  mg/ml Volume:   
150cc  
Time contrast administered: 2:45   
pm  
  
Complications/Reaction: Yes No  
  
Patient Disposition:  
Patient IV access: Peripheral  
Time IV removed: 3:15 pm  
Patient/family provided with   
treatment instructions: Yes   
Patient/family verbally   
acknowledged understanding of   
treatment instructions: Yes  
Disposition: home  
  
Therapist: husam  
  
  
Electronically signed by [facility]   
Provider at 2023 3:19 PM EDT  
documented in this encounter            [facility]  
   
                                                    2023 History of   
Present illness Narrative               Formatting of this note might be   
different from the original.  
KADEMITESH  
[id number]  
  
2023  
  
[facility]  
Department of Radiation Oncology  
[facility]  
  
RADIATION ONCOLOGY SIMULATION NOTE  
  
DATE OF SIMULATION: 2023  
  
MACHINE: CT Simulator  
  
DIAGNOSIS: 83-year-old male with   
newly diagnosed radiographic HCC   
per MRI obtained 2023 showing   
a 4.1 cm tumor in segment 4A.   
Systemic staging, labs for   
classification (child Coffey, MELD)   
pending. Tentatively intermediate   
stage per BCLC, cT2 N0 MX stage II   
per AJCC.  
  
AREA:Segment 4a liver HCC.  
  
PATIENT POSITION: Supine.  
  
CONTRAST: 150cc IV omnipaque.  
  
PROTOCOL: None  
  
BLOCKS: Custom blocks are   
necessary to develop an optimal   
plan.  
  
FIXATION DEVICE: In order to   
achieve accurate and reproducible   
treatments, the patient is   
immobilized with a bodyfix device   
and ABC for respiratory motion   
management.  
  
PROCEDURE: A time-out was   
conducted and recorded by the   
therapist. Patient was simulated   
on the CT scanner for external   
beam radiation therapy. Use of the   
ABC device for respiratory motion   
management/ITV creation was   
reviewed at the time of simulation   
and found to be adequate.   
Treatment site was marked by the   
simulation therapist.  
  
ASSESSMENT/PLAN: Patient tolerated   
simulation procedure well.   
Treatments will be initiated after   
treatment planning. The patient   
will be scheduled for a   
verification simulation on the   
treatment machine to ensure proper   
set-up and field arrangement is   
correct prior to the first   
treatment of primary and any boost   
fields if applicable.  
  
Electronically Signed  
Marin Fish M.D.  
:47 PM  
  
  
Electronically signed by Marin Fihs MD at 2023 4:47 PM   
EDT  
documented in this encounter            [facility]  
   
                                                    2023 Miscellaneous   
Notes                                   Formatting of this note is   
different from the original.  
RADIATION ONCOLOGY NURSING PRE-SIM   
CALL  
  
Today's date: May 4, 2023  
Time: 11:32 AM  
  
SIM date: 23  
  
Spoke with patient's daughter,   
Dixie. Patient instructed to be   
NPO after 10am, daughter   
verbalized understanding.  
  
SIM needs:  
Do you have a Mediport or PICC: No  
  
Anxiety / Claustrophobia History:   
No  
  
Pain Needs:  
Is that patient having pain that   
will impact SIM? No  
Will patient need pain medication   
prior to SIM? No  
  
Last Creatinine:  
Creatinine  
Date Value Ref Range Status  
2023 1.05 0.73 - 1.22 mg/dL   
Final  
]  
Last BUN:  
BUN  
Date Value Ref Range Status  
2023 27 (H) 9 - 24 mg/dL   
Final  
  
Last GFR: No results found for:   
EGFR  
IV Contrast: No  
Diabetic: No  
Pregnancy Status: Patient is male  
  
Maribell Hart RN  
Electronically signed by Maribell Hart RN at 2023 11:37   
AM EDT  
documented in this encounter            [facility]  
   
                                2023 Note                 [facility]  
   
                                2023 Note                 [facility]  
   
                                2023 Note                 [facility]  
   
                                2023 Note                 [facility]  
   
                                                    2023 History of   
Present illness Narrative               Formatting of this note might be   
different from the original.  
[facility]   
Pharmacy received prescription(s)   
for Xifaxan from Dr. Juanito Esparza's office. Benefits   
investigation was conducted,   
indicating that a prior   
authorization is required by   
patient's medicare part D   
insurance plan with [employer].  
  
Encounter will be updated once   
prior authorization has been   
submitted by [facility]   
[facility] Pharmacy.  
  
[facility]   
[facility]arm[facility]  
[id number]  
[place] [address]  
[email or web address]  
[phone]  
[phone]  
  
Electronically signed by Mari Giles (Pharmacy Tech) at   
2023 11:50 AM EDT  
Formatting of this note might be   
different from the original.  
Xifaxan PA was initiated and   
pending review.  
  
Plan Name: [employer] med [employer]  
Plan Agent/Key: [email or web address]   
(key [id number])  
Phone: [phone]  
Case: [id number]  
Timeline: 24-72 hrs  
  
Mari Giles (Pharmacy Tech)  
Electronically signed by Mari Giles (Pharmacy Tech) at   
2023 3:58 PM EDT  
documented in this encounter            [facility]  
   
                                2023 Note                 [facility]  
   
                                        2023 Instructions   
  
  
Juanito Esparza MD -   
2023 9:20 AM EDTFormatting   
of this note might be different   
from the original.  
As discussed in the office, you   
have a diagnosis of cirrhosis.  
The main complications of   
cirrhosis are:  
1. Ascites (fluid in the belly)  
2. Hepatic Encephalopathy   
(confusion)  
3. Varices (veins in the esophagus   
that can bleed)  
4. Hepatocellular Carcinoma (liver   
cancer)  
  
Should you develop any new   
symptoms or have worsening   
symptoms please contact our office   
immediately.  
  
You should go to the nearest   
emergency room and call our office   
immediately if any of the   
following occur:  
1. Temperature of 101 or above  
2. Confusion  
3. Vomiting blood  
4. Multiple black or red stools  
5. Shortness of breath  
6. Severe diarrhea  
7. Vomiting for more than twice in   
24 hours  
  
-Call my office to discuss any   
surgeries (elective or emergent)   
since cirrhotic patients are at an   
increased risk for surgery in   
terms of infection, bleeding, and   
even death.  
-Call my office if you are ever   
seen in the ER or admitted to the   
hospital.  
  
General Instructions:  
-Liver cancer prevention: You will   
need a picture of your liver with   
an ultrasound or MRI every 6   
months to screen for liver cancer.   
Each year you are at a small risk   
of liver cancer, and so this step   
is important to preventing the   
development of liver cancer and   
catching it early.  
  
-Diet/exercise:  
-It is very important to limit the   
amount of salt you eat on a daily   
basis. Salt (or  sodium  as it is   
labeled on most food labels) can   
lead to retention of fluid in the   
belly and legs. Limiting sodium to   
< 2000 mg (or 2 grams) is key to   
prevent this complication. Salt is   
hidden in nearly everything (even   
sweet foods!) so it is very   
important to read labels before   
cooking with or eating something.   
One of the best ways to know and   
limit how much salt you eat is to   
cook/prepare as much fresh food as   
you can.  
-In cirrhosis, your muscles tend   
to waste/atrophy, and so it is   
important to get a sufficient   
amount of protein in the diet.   
Plant-based protein and lean cuts   
of meat or fish are healthy. Try   
to avoid red meat if possible.  
-Avoid all avoid raw shellfish and   
undercooked meat- eating this can   
put you at risk of developing   
certain very serious infections.  
-Abstain from all alcohol intake.   
Even a small amount can have   
tremendous impact on your liver   
health.  
Try and participate in daily   
exercise or joyful movement every   
day.  
  
Medications:  
-You will likely be on many   
medications and it can sometimes   
be very difficult to remember when   
to take them. Something like a   
pill box or a phone alarm can be   
very helpful in reminding you when   
you should take your medications.  
-Take no more than 2 grams of   
tylenol in 24 hours (a sick liver   
cannot effectively process certain   
medications and too much tylenol   
can be dangerous to the liver and   
even lead to liver failure).  
-Because the liver cannot process   
certain medications as effectively   
when you have cirrhosis, avoid   
medications such as Benadryl,   
benzodiazepines (Ativan, valium,   
etc), and certain opiate pain   
medications if possible (tramadol,   
norco, oxycontin, dilaudid,   
morphine). These medications can   
lead to worsening confusion and   
sleepiness.  
-Avoid medications like Ibuprofen,   
Motrin, Aleve, Excedrin. These   
medications are non-steroidal   
anti-inflammatory drugs (NSAIDs)   
and can put lots of undue stress   
on the kidneys (which are   
particularly sensitive in patients   
with cirrhosis).  
-Call my office to discuss the   
risks and benefits of any new   
medications ( prescribed or over   
the counter) before taking the new   
medication.  
  
Other/general:  
-You should ask your primary care   
to ensure you have been vaccinated   
for pneumonia and the flu  
-If you are starting to struggle   
with confusion and memory loss, it   
is NOT safe to drive a vehicle or   
operate heavy machinery. Having   
cirrhosis with confusion can lead   
to impaired reflexes, and driving   
under these circumstances is like   
driving under the influence of   
drugs or alcohol. Please talk to   
your doctor (and our office) if   
you or a family member notices you   
are struggling with this.  
  
For more information about   
cirrhosis and how to navigate   
living with this disease, one   
excellent website is   
https://www.cirrhosiscare.ca/  
This provides lots of good   
resources for patients and their   
family members. Highly recommend   
this!  
Electronically signed by Juanito Esparza MD at 2023 9:20 AM   
EDT  
  
documented in this encounter            [facility]  
   
                                                    2023 History of   
Present illness Narrative               Formatting of this note is   
different from the original.  
HCC Clinic New Patient  
Date of visit:  
  
CC: new diagnosis of HCC  
  
HPI: This is a 83 year old with   
medical history of   
well-compensated CÁRDENAS-related   
cirrhosis (MELD-Na 8) complicated   
by HCC (within [place]), possible   
HE, PUD (bled ) here for   
follow-up  
Follows with Maurizio Cochran for CÁRDENAS  
  
Long-standing history of NAFLD  
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   
CÁRDENAS-related cirrhosis (given his   
risk factors) in 2022 on   
further workup of anemia. Saw   
hematology/oncology for this,   
eventually had liver imaging which   
noted a mass in 2022, recent   
MRI in April with cirrhosis along   
with a 4.1 cm HCC in seg 4A  
Case discussed at tumor board and   
he was referred for SBRT (limited   
options given age, functional   
limitations, etc)  
He met with radiation oncology   
just recently and is eager to get   
treatment started  
  
With respect to his liver-related   
symptoms:  
Sleeps a lot in the daytime, not   
so much at night. Possibly a bit   
more confusion when he talks.  
Has at least 4-5 soft bowel   
movements/day,  black as coal    
because he is taking iron   
supplements.  
No abdominal distention. Takes   
lasix for LE edema  
No jaundice, pruritis  
  
HCC history:  
Diagnosis date: 2022  
Initial AFP: 8.5  
Biopsy-proven?: no  
Imaging: MRI  
Within [place]: Yes  
CPT score: A  
ECOG performance status: 1  
  
Treatment history:  
LT candidate: no  
Resection: no  
Locoregional therapy: getting SBRT  
  
Review of Systems:  
See note  
  
Past Medical History:  
PAST MEDICAL HISTORY  
Diagnosis Date  
Adenomatous colon polyp 8/3/2011  
Adjustment disorder with depressed   
mood 3/9/2012  
Aortic valve disorders 2007  
stenosis  
Asthma exacerbation 11/10/2012  
Calculus of ureter 2005  
Esophageal reflux 10/14/2011  
Essential hypertension 10/14/2005  
Impaired renal function 4/15/2010  
Impotence of organic origin   
2006  
Intestinal polyp 8/3/2011  
Nonspecific abnormal results of   
liver function study 2006  
Obesity, unspecified  
CAREN on CPAP 10/13/2005  
Other abnormal blood chemistry  
Hyperuricemia  
Other and unspecified   
hyperlipidemia 10/14/2005  
Peripheral polyneuropathy 2/3/2015  
Polycystic kidney 2010  
Snoring  
Splenomegaly 9/10/2014  
Thrombocytopenia (HCC) 10/5/2011  
  
Family History:  
FAMILY HISTORY  
Problem Relation Age of Onset  
Heart Mother  
 in 70's  
Coronary Artery Disease Father  
 in his 80's  
Emphysema Father  
Diabetes Brother  
  
Social History:  
Tobacco: denies  
EtOH: denies  
Substances: denies  
  
I have confirmed and edited as   
necessary, the PFSH and ROS   
obtained by others.  
  
Current Outpatient Medications on   
File Prior to Visit  
Medication Sig  
oxybutynin ER (DITROPAN XL) 15 mg   
24 hr Extended Rel Tab Take 2   
tablets by mouth once daily.  
furosemide (LASIX) 20 mg tablet   
TAKE 1 TABLET BY MOUTH EVERY OTHER   
RDAY  
Diaper,Brief, Adult,Disposable   
(DEPEND EASY FIT UNDERGARMENTS) 1   
Each three times daily.  
ascorbic acid, vitamin C, (VITAMIN   
C) 500 mg tablet Take 1 tablet by   
mouth once daily.  
ascorbic acid-elderberry fruit   
100-50 mg chew Take 2 tablets by   
mouth once daily.  
cyanocobalamin (VITAMIN B-12)   
2,500 mcg tablet Take 2,500 mcg by   
mouth once daily.  
aspirin, enteric coated (ASPIRIN,   
ENTERIC COATED) 81 mg EC tablet   
Take 81 mg by mouth once daily.  
albuterol HFA (PROVENTIL HFA,   
VENTOLIN HFA) 90 mcg/actuation   
inhaler Inhale 2 Puffs as   
instructed every 4 hours as needed   
for wheezing/shortness of breath.  
omeprazole (PRILOSEC) 40 mg   
capsule Take 1 capsule by mouth   
once daily.  
allopurinol (ZYLOPRIM) 300 mg   
tablet Take 1 tablet by mouth once   
daily.  
magnesium oxide 400 mg magnesium   
cap Take 1 capsule by mouth once   
daily.  
fenofibrate nanocrystallized   
(TRICOR) 48 mg tablet Take 1   
tablet by mouth once daily.  
sertraline (ZOLOFT) 50 mg tablet   
Take 1 tablet by mouth once daily.  
nitroglycerin sublingual   
(NITROQUICK) 0.4 mg SL tablet   
Dissolve 1 tablet under the tongue   
every 5 minutes as needed for   
chest pain. FOR CHEST PAIN. IF NO   
RELIEF CALL 911  
fluticasone (FLONASE) 50   
mcg/actuation nasal spray Use 1   
Spray in each nostril once daily.  
ferrous sulfate 325 mg (65 mg   
iron) EC tablet Take 1 tablet by   
mouth twice daily with meals.   
(Patient taking differently: Take   
325 mg by mouth once daily.)  
fluticasone-vilanterol (BREO   
ELLIPTA) 200-25 mcg/dose inhaler   
Inhale 1 Inhalation as instructed   
once daily. Inhale one puff once   
daily. DO NOT CLICK OPEN UNTIL   
READY FOR DOSE  
multivitamin tablet Take 1 tablet   
by mouth once daily.  
torsemide (DEMADEX) 20 mg tablet   
Take 2 tablets by mouth once daily   
for 5 days.  
melatonin 10 mg tab Take 1 tablet   
by mouth as needed.  
amoxicillin (POLYMOX, AMOXIL) 500   
mg capsule Take four tablets, one   
hour prior to dental work (Patient   
not taking: Reported on 2023)  
  
No current facility-administered   
medications on file prior to   
visit.  
  
Vitals:  
Ht 5' 10  (1.78m)   Wt 246 lb 12.8   
oz (111.9kg)   BMI 35.41 kg/(m^2).  
  
  
Physical Exam:  
Well appearing, in NAD  
No jaundice  
Aox3  
In a wheelchair  
  
Laboratory:  
MELD-Na score: 8 at 2023 2:27   
PM  
MELD score: 8 at 2023 2:27 PM  
Calculated from:  
Serum Creatinine: 1.09 mg/dL at   
2023 2:27 PM  
Serum Sodium: 140 mmol/L (Using   
max of 137 mmol/L) at 2023   
2:27 PM  
Total Bilirubin: 0.5 mg/dL (Using   
min of 1 mg/dL) at 2023 2:27   
PM  
INR(ratio): 1.1 at 2023 2:27   
PM  
Age: 83 years  
  
Disclaimer: The MELD Calculator   
cannot calculate MELD scores for   
patients on dialysis.  
  
AFP  
Lab Results  
Component Value Date  
AFP 8.5 2023  
  
Imaging:  
MRI Liver 2022:  
Lesion#: [id number]  
Location: Segment Keren; (series 15   
image 19)  
Size (maximum long-axis   
dimension): 4.1 cm, Prior size:   
N/A  
Arterial Phase Hyperenhancement   
(APHE): Nonrim APHE  
Wash-out: Yes - non-peripheral  
Enhancing Capsule: Yes  
Threshold growth (50% or more   
growth in 6 months or less): N/A  
Ancillary features favoring   
malignancy or HCC: Fat in mass   
more than  
adjacent liver  
Ancillary features favoring   
benignity: N/A  
LI-RADS Category: LR-5 (definitely   
HCC)  
OPTN Class 5 Category:5B  
  
Hepatic vasculature and   
collaterals: ...  
  
Portal venous system (splenic   
vein, main portal vein, left and   
right  
anterior and right posterior   
portal vein branches): Patent.  
Portal vein diameter: 1.6 cm.  
Celiac trunk and SMA: Patent. No   
stenosis.  
Hepatic artery: Patent. Replaced   
right hepatic artery from SMA.  
Hepatic veins: Patent.  
Collaterals:  
Spontaneous splenorenal shunt:   
Absent  
Recanalized paraumbilical vein:   
Small  
Esophageal varices: Absent.  
Mesenteric portosystemic   
collaterals: Absent  
  
Related extrahepatic findings:  
  
Spleen: 13.7 cm (craniocaudally),   
enlarged. No mass.  
  
Mesentery/Peritoneum: Trace   
ascites. No mass.  
  
Other findings:  
  
Biliary: No bile duct dilation.   
Gallbladder present with   
cholelithiasis.  
  
Pancreas: No mass or duct   
dilation.  
  
Adrenals: No mass.  
  
Kidneys: Multiple bilateral cysts.   
No solid mass. No hydronephrosis.  
  
GI: No dilated bowel or wall   
thickening along imaged segments.   
Small  
hiatal hernia  
  
Lymph nodes: No abdominal   
lymphadenopathy.  
  
Vasculature: No abdominal aortic   
aneurysm.  
  
Bones/Soft Tissues: Degenerative   
change. Focal enhancement in the   
right  
L1 transverse process, likely   
posttraumatic. No other osseous   
lesion.  
  
Lower chest: Left lower lobe   
mucoid impaction. Bronchial wall   
thickening.  
  
Endoscopy:  
EGD :  
Findings:  
The examined jejunum was normal.  
The examined duodenum was normal.  
Localized moderate inflammation   
characterized by erosions,   
erythema  
and friability was found in the   
gastric antrum. Biopsies were   
taken  
with a cold forceps for histology.  
A medium-sized hiatal hernia was   
present.  
Non-severe esophagitis with no   
bleeding was found.  
  
Assessment and plan:  
This is a 83 year old with medical   
history of well-compensated   
CÁRDENAS-related cirrhosis (MELD-Na 8)   
complicated by HCC (within [place]),   
possible HE, PUD (bled ) here   
for follow-up, doing well  
We discussed the natural   
progression of his liver disease   
and signs/symptoms to look out for   
which would suggest progression of   
disease  
He is well-compensated at the   
moment which bodes well for   
treatment options in the future if   
he were to progress from a cancer   
standpoint  
  
Cirrhosis:  
HE: sounds as if he has HE but has   
loose stool throughout the day.   
Will check zinc, start rifaximin  
EV: EGD ordered, needs to schedule  
Ascites/SBP: none on imaging  
HCC: Planning on SBRT for his HCC   
which is appropriate given his age   
and comorbidities. Needs CT chest   
so will make sure that is   
scheduled  
  
RTC 3 months  
  
I spent a total of 45 minutes on   
the date of the service which   
included preparing to see the   
patient, face-to-face patient   
care, completing clinical   
documentation, obtaining and/or   
reviewing separately obtained   
history, performing a medically   
appropriate examination,   
counseling and educating the   
patient/family/caregiver, ordering   
medications, tests, or procedures,   
and communicating with other HCPs   
(not separately reported).  
  
Juanito Esparza MD  
Associate Staff, Department of   
Gastroenterology and Hepatology  
[facility]   
[place]  
  
--  
ASSESSMENT/PLAN:  
1. 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   
ascites, unspecified hepatic   
cirrhosis type (HCC) - ICD9:   
571.5, ICD10: K74.60  
- ZINC BLD  
- HEPATIC FUNCTION PNL  
- PROTHROMBIN TIME/PT  
- CBC + DIFF  
- BASIC METABOLIC PNL  
  
Juanito Esparza MD  
Electronically signed by Juanito Esparza MD at 2023 10:27   
AM EDT  
documented in this encounter            [facility]  
   
                                2023 Note                 [facility]  
   
                                                    2023 History of   
Present illness Narrative               Formatting of this note is   
different from the original.  
Images from the original note were   
not included.  
Radiation Oncology - New   
Patient/Consult Note  
  
PATIENT NAME: Mitesh Braun  
PATIENT MRN: [id number]  
  
REQUESTING PROVIDER: Tello Ortega DO  
  
DIAGNOSIS: 83-year-old male with   
newly diagnosed radiographic HCC   
per MRI obtained 2023 showing   
a 4.1 cm tumor in segment 4A.   
Systemic staging, labs for   
classification (child [place], MELD)   
pending. Tentatively intermediate   
stage per BCLC, cT2 N0 MX stage II   
per AJCC. Discussed at tumor board   
and referred for consideration of   
SBRT.  
  
HPI: 83 year old male who presents   
with above diagnosis, for an   
opinion regarding the role of   
radiation therapy in the   
management of the patient's   
disease. Final recommendations   
will be communicated back to the   
requesting physician by way of the   
shared medical record, or letter   
to requesting physician via US   
mail.  
  
83-year-old gentleman with a past   
medical history of polycystic   
kidney disease, coronary artery   
disease, obstructive sleep apnea,   
asthma, and chronic anemia who was   
recently diagnosed with cirrhosis   
complicated by hepatocellular   
carcinoma as outlined above. In   
review, he was first seen by   
hematology/oncology on 2022   
for evaluation of persistent   
anemia with concern for a   
presentation possibly consistent   
with multiple myeloma or MGUS.   
Given his history of splenomegaly,   
an ultrasound of the liver and   
spleen was ordered. This was   
obtained on 2022 and showed   
splenomegaly, cirrhosis (not   
previously described), and a  new   
hyperechoic nodule in the right   
lobe of the liver which could be   
further evaluated by MRI . In   
2023 he underwent bone   
marrow biopsy which did not reveal   
any evidence of  smoldering   
myeloma or multiple myeloma . He   
does have a low level IgG kappa   
monoclonal gammopathy consistent   
with monoclonal gammopathy of   
unknown significance. He was also   
treated for iron deficiency   
anemia, with plans to reassess in   
1 year with medical oncology. He   
was also scheduled for a liver   
MRI, however, needed the MRI to be   
open and also required anesthesia   
and was unable to obtain until   
2023. At this time, MRI did   
show a cirrhotic liver with a 4.1   
cm liver mass compatible with HCC   
in segment 4A. His case was   
discussed at liver tumor board   
with recommendation for pursuit of   
local therapy, and he has been   
referred for discussions of SBRT.  
  
Today he presents with his   
daughter and son-in-law on speaker   
phone. He reiterates the history   
provided above. He notes that he   
needed the MRI under anesthesia   
which was obtained without issue.   
He reports that his cancer was   
discovered via US as part of his   
workup for anemia. He has not had   
significant issues recently and   
tolerated MRI well. He denies   
headache, lightheadedness,   
dizziness, syncope. Eating and   
drinking OK, swallowing without   
issue. No chest pain, worsening   
SOB, cough or hemoptysis. No   
abdominal pain, N, V. Urination   
and defecation at baseline without   
blood. Walks with a walker no   
change from prior; no focal   
deficits.  
  
In terms of his past medical   
history, he has listed diagnoses   
of GERD, hypertension, CKD, CAREN,   
MGUS, peripheral neuropathy,   
polycystic kidney disease,   
splenomegaly, and   
thrombocytopenia. No prior   
diagnoses of cancer. No prior   
history of radiation therapy. No   
diagnosis of connective tissue   
disease, ulcerative colitis, or   
Crohn's disease.  
  
Prior radiation therapy, collagen   
vascular disease, or inflammatory   
bowel disease: No  
Pregnancy status: Male  
  
Residence: [place]  
Occupation: Retired   
  
WEIGHT  
Last 5 Encounter Wt Readings:  
Date: Wt:  
2023 108.9 kg (240 lb)  
2023 109.3 kg (241 lb)  
03/10/2023 108.2 kg (238 lb 9.6   
oz)  
2023 108.2 kg (238 lb 8 oz)  
2023 111.6 kg (246 lb)  
  
IMAGING  
2023: MRI liver  
IMPRESSION:  
  
Cirrhotic liver.  
  
4.1 cm liver mass compatible with   
HCC.  
  
Left lower lobe mucoid impaction.   
Bronchial wall thickening.  
  
RESULT:  
  
Hepatic morphology and masses:   
Cirrhotic morphology.  
  
Lesion#: 1  
Location: Segment Keren; (series 15   
image 19)  
Size (maximum long-axis   
dimension): 4.1 cm, Prior size:   
N/A  
Arterial Phase Hyperenhancement   
(APHE): Nonrim APHE  
Wash-out: Yes - non-peripheral  
Enhancing Capsule: Yes  
Threshold growth (50% or more   
growth in 6 months or less): N/A  
Ancillary features favoring   
malignancy or HCC: Fat in mass   
more than  
adjacent liver  
Ancillary features favoring   
benignity: N/A  
LI-RADS Category: LR-5 (definitely   
HCC)  
OPTN Class 5 Category:5B  
  
Hepatic vasculature and   
collaterals: ...  
  
Portal venous system (splenic   
vein, main portal vein, left and   
right  
anterior and right posterior   
portal vein branches): Patent.  
Portal vein diameter: 1.6 cm.  
Celiac trunk and SMA: Patent. No   
stenosis.  
Hepatic artery: Patent. Replaced   
right hepatic artery from SMA.  
Hepatic veins: Patent.  
Collaterals:  
Spontaneous splenorenal shunt:   
Absent  
Recanalized paraumbilical vein:   
Small  
Esophageal varices: Absent.  
Mesenteric portosystemic   
collaterals: Absent  
  
Related extrahepatic findings:  
  
Spleen: 13.7 cm (craniocaudally),   
enlarged. No mass.  
  
Mesentery/Peritoneum: Trace   
ascites. No mass.  
  
Other findings:  
  
Biliary: No bile duct dilation.   
Gallbladder present with   
cholelithiasis.  
  
Pancreas: No mass or duct   
dilation.  
  
Adrenals: No mass.  
  
Kidneys: Multiple bilateral cysts.   
No solid mass. No hydronephrosis.  
  
GI: No dilated bowel or wall   
thickening along imaged segments.   
Small  
hiatal hernia  
  
Lymph nodes: No abdominal   
lymphadenopathy.  
  
Vasculature: No abdominal aortic   
aneurysm.  
  
Bones/Soft Tissues: Degenerative   
change. Focal enhancement in the   
right  
L1 transverse process, likely   
posttraumatic. No other osseous   
lesion.  
  
Lower chest: Left lower lobe   
mucoid impaction. Bronchial wall   
thickening.  
  
LABS  
Component Latest Ref Rng & Units   
2023  
WBC 3.70 - 11.00 k/uL 3.87  
RBC 4.20 - 6.00 m/uL 3.63 (L)  
Hemoglobin 13.0 - 17.0 g/dL 11.8   
(L)  
Hematocrit 39.0 - 51.0 % 36.2 (L)  
MCV 80.0 - 100.0 fL 99.7  
MCH 26.0 - 34.0 pg 32.5  
MCHC 30.5 - 36.0 g/dL 32.6  
RDW-CV 11.5 - 15.0 % 20.0 (H)  
Platelet Count 150 - 400 k/uL 66   
(L)  
MPV 9.0 - 12.7 fL 9.8  
Neut% % 79.1  
Abs Neut (ANC) 1.45 - 7.50 k/uL   
3.06  
Lymph% % 11.1  
Abs Lymph 1.00 - 4.00 k/uL 0.43   
(L)  
Mono% % 6.2  
Abs Mono <0.87 k/uL 0.24  
Eosin% % 2.8  
Abs Eosin <0.46 k/uL 0.11  
Baso% % 0.5  
Abs Baso <0.11 k/uL <0.03  
Immature Gran % % 0.3  
IMMATURE GRANS (ABS) <0.10 k/uL   
<0.03  
NRBC /100 WBC 0.0  
Absolute nRBC <0.01 k/uL <0.01  
DTYPE Auto  
Protein, Total 6.3 - 8.0 g/dL 6.0   
(L)  
Albumin 3.9 - 4.9 g/dL 3.4 (L)  
Calcium 8.5 - 10.2 mg/dL 10.1  
Bilirubin, Total 0.2 - 1.3 mg/dL   
0.4  
Alkaline Phosphatase 38 - 113 U/L   
161 (H)  
AST 14 - 40 U/L 41 (H)  
ALT 10 - 54 U/L 29  
Glucose 74 - 99 mg/dL 97  
BUN 9 - 24 mg/dL 32 (H)  
Creatinine 0.73 - 1.22 mg/dL 1.01  
Sodium 136 - 144 mmol/L 137  
Potassium 3.7 - 5.1 mmol/L 4.2  
Chloride 97 - 105 mmol/L 106 (H)  
CO2 22 - 30 mmol/L 24  
Anion Gap 9 - 18 mmol/L 7 (L)  
eGFR >=60 mL/min/1.73m 74  
AFP <11.0 ng/mL 8.5  
  
ALLERGIES  
Allergen Reactions  
Bees Anaphylaxis  
Acetaminophen Other: See Comments  
Hydrocodone GI Upset  
Lipitor [Atorvastat* Itching  
Singulair [Monteluk* Itching  
Strawberries Hives  
hives  
  
PAST MEDICAL HISTORY  
Diagnosis Date  
Adenomatous colon polyp 8/3/2011  
Adjustment disorder with depressed   
mood 3/9/2012  
Aortic valve disorders 2007  
stenosis  
Asthma exacerbation 11/10/2012  
Calculus of ureter 2005  
Esophageal reflux 10/14/2011  
Essential hypertension 10/14/2005  
Impaired renal function 4/15/2010  
Impotence of organic origin   
2006  
Intestinal polyp 8/3/2011  
Nonspecific abnormal results of   
liver function study 2006  
Obesity, unspecified  
CAREN on CPAP 10/13/2005  
Other abnormal blood chemistry  
Hyperuricemia  
Other and unspecified   
hyperlipidemia 10/14/2005  
Peripheral polyneuropathy 2/3/2015  
Polycystic kidney 2010  
Snoring  
Splenomegaly 9/10/2014  
Thrombocytopenia (HCC) 10/5/2011  
  
PAST SURGICAL HISTORY  
Procedure Laterality Date  
ARTHRP KNE CONDYLE&PLATU   
MEDIAL&LAT COMPARTMENTS Left   
2017  
BAL. ASSIST ENTEROSCOPY W/ BX   
2011  
CAPSULE ENDO SMALL BOWEL W EGD   
2011  
COLONOSCOPY FLX DX W/COLLJ SPEC   
WHEN PFRMD   
Colonoscopy  
COLONOSCOPY FLX DX W/COLLJ SPEC   
WHEN PFRMD   
Colonoscopy  
COLONOSCOPY FLX DX W/COLLJ SPEC   
WHEN PFRMD 2011  
Colonoscopy  
COLONOSCOPY FLX DX W/COLLJ SPEC   
WHEN PFRMD 2018  
Colonoscopy  
COLONOSCOPY W/BIOPSY   
SINGLE/MULTIPLE 2008  
ESOPHAGOGASTRODUODENOSCOPY   
TRANSORAL DIAGNOSTIC 2018  
EGD  
ESOPHAGOGASTRODUODENOSCOPY   
TRANSORAL DIAGNOSTIC 2019  
EGD  
ESOPHAGOGASTRODUODENOSCOPY   
TRANSORAL DIAGNOSTIC 2020  
EGD  
EXCISION PILONIDAL CYST/SINUS   
SIMPLE 1964  
HEMORRHOIDECTOMY INTERNAL RUBBER   
BAND LIGATIONS 2018 and 18  
OPTX FEM SHFT FX W/INSJ IMED IMPLT   
W/WO SCREW Left 2020  
L hip cephalo medullary nail.  
RPLCMT AORTIC VALVE OPN   
W/STENTLESS TISSUE VALVE   
10/04/2011  
25-mm Liza-Satck   
pericardial prosthesis via upper   
ministernotomy.  
RPR UMBILICAL HRNA 5 YRS/>   
REDUCIBLE 09/10/2010  
Hernia repair, umbilical >5yr  
  
FAMILY HISTORY  
Problem Relation Age of Onset  
Heart Mother  
 in 70's  
Coronary Artery Disease Father  
 in his 80's  
Emphysema Father  
Diabetes Brother  
  
Social History  
  
Tobacco Use  
Smoking status: Never  
Smokeless tobacco: Never  
Vaping Use  
Vaping Use: Never used  
Substance Use Topics  
Alcohol use: No  
Drug use: No  
  
COMPLETE REVIEW OF SYSTEMS:  
See HPI  
  
PHYSICAL EXAM:  
VS: /58   Pulse 70   Resp 18   
  Wt 108.9 kg (240 lb)   SpO2 89%   
  BMI 37.59 kg/m  
KPS: 70  
General Appearance: Alert and   
oriented. No acute distress.  
HEENT: NCAT. Sclera anicteric.   
EOMI.  
Neck: Normal ROM.  
Chest: No respiratory distress.  
Heart: Regular rate.  
Abdomen: Soft. Nondistended.  
Musculoskeletal: Baseline ROM in   
extremities.  
Neuro: Speech fluent. No focal   
deficits.  
Skin: No rashes noted.  
Hematologic: No signs of active   
bleeding.  
  
RADIOLOGY/LABORATORY DATA: see HPI  
  
ASSESSMENT AND PLAN: 83-year-old   
male with newly diagnosed   
radiographic HCC per MRI obtained   
2023 showing a 4.1 cm tumor   
in segment 4A. Systemic staging,   
labs for classification (child   
Coffey, MELD) pending. Tentatively   
intermediate stage per BCLC, cT2   
N0 MX stage II per AJCC. Discussed   
at tumor board and referred for   
consideration of SBRT. Today we   
discussed his history of   
presentation and most recent   
imaging results. We discussed that   
his blood work and systemic   
staging with further imaging   
studies is still pending. Based on   
the information at hand, we   
discussed the role of stereotactic   
body radiation therapy in the   
treatment of hepatocellular   
carcinoma and its utility for his   
situation. We discussed the risks,   
benefits, alternatives,   
procedures, mechanics and   
personnel involved in treatment.   
We also discussed the possible   
acute and late side effects   
involved. Mr. Hurt verbalized   
understanding of these issues and   
all questions were answered at   
this time. He will be scheduled   
for simulation. Further discussion   
on Renate vs main [facility] and 3 vs 5   
fractions as per Dr. Fish   
addendum below. This was discussed   
with him and he is aware.  
  
Joseph Cowart MD  
Radiation Oncology Resident  
[id number]  
  
STAFF ADDENDUM: I have read and   
reviewed the above, as well as   
discussed with Dr. Cowart,   
reviewed the medical record, and   
confirmed with the patient. I   
agree with the above.  
  
In summary Mr. Braun is a very   
pleasant 83-year-old gentleman   
with a new diagnosis of   
hepatocellular carcinoma of the   
hepatic dome. Not a surgical   
candidate given his age, cirrhosis   
and medical comorbidities.  
  
Compared and contrasted available   
nonsurgical therapies including   
radiofrequency ablation,   
embolization, and stereotactic   
body radiation. Given location in   
the dome and adjacent to the heart   
not a candidate for radiofrequency   
ablation. Patient prefers   
noninvasive approach and   
stereotactic body radiation offers   
overall excellent local control   
and favorable toxicity profile.   
Reviewed relative RBA and patient   
demonstrates understanding and   
agreement to proceed. We will   
schedule simulation. Same day CT   
chest to complete staging.   
Tentatively plan for 45 Gray in 3   
fractions.  
  
60 minutes were spent in   
consultation greater than 50% of   
which was direct counseling   
regarding treatment decisions and   
coordination of care.  
  
Marin Fish MD  
  
cc: Júnior Ahuja  
[address] [place] [address]  
Phone: [phone]  
Fax: [phone]  
  
Dr. Ortega, [facility]  
  
  
Electronically signed by Marin Fish MD at 2023 10:33   
AM EDT  
documented in this encounter            [facility]  
   
                                                    2023 Miscellaneous   
Notes                                   Formatting of this note might be   
different from the original.  
Left voicemail for patient's   
daughter about liver tumor board   
results  
See Koogame message from today  
Electronically signed by Maurizio Cochran PA-C at 2023 4:38 PM   
EDT  
documented in this encounter            [facility]  
   
                                                    2023 Miscellaneous   
Notes                                   Formatting of this note might be   
different from the original.  
Spoke to patient's daughter Dixie,   
informed her MRI is consistent   
with hepatocellular carcinoma and   
cirrhosis. Discussed need for   
referral to liver tumor board,   
hopefully this week or next  
Will need CT chest wo cont  
She has not scheduled   
EGD/colonoscopy as previously   
recommended due to wanting to see   
results of the MRI, patient has   
also been cranky recently  
  
From cirrhosis perspective, seems   
relatively well compensated but   
suspect he has some degree of   
portal HTN with thrombocytopenia.  
MELD labs ordered, will let her   
know outcome of liver tumor board   
meeting  
  
She verbalized understanding and   
thanked me for calling  
Electronically signed by Maurizio Cochran PA-C at 2023 4:36 PM   
EDT  
Formatting of this note might be   
different from the original.  
Patient's daughter Dixie called   
back, tried to transfer her to you   
but went to .  
Electronically signed by Shanna Spicer at 2023 3:41 PM EDT  
Formatting of this note might be   
different from the original.  
Attempted to call patient's   
daughter Dixie, to review MRI   
results today but had to leave   
voicemail, call back number given.  
  
If she returns my call, please let   
me know as I'd like to speak with   
her directly about the MRI   
findings. Will send [place]   
message if unable to reach her by   
this evening as I am off tomorrow   
  
  
Maurizio Cochran PA-C  
2023 3:14 PM  
  
Electronically signed by Maurizio Cochran PA-C at 2023 3:15 PM   
EDT  
documented in this encounter            [facility]  
   
                                2023 Note                 [facility]  
   
                                2023 Note                 [facility]  
   
                                2023 Note                 [facility]  
   
                                2023 Note                 [facility]  
   
                                                    2023 History of   
Present illness Narrative               Formatting of this note might be   
different from the original.  
Radiology Service Progress Note  
  
PATIENT NAME: Mitesh Braun  
MRN: [id number]  
  
DATE OF SERVICE: 2023  
TIME: 1:01 PM  
PATIENT IDENTITY VERIFICATION   
COMPLETED USING TWO (2)   
IDENTIFIERS: Name and Date of   
Birth confirmed by patient   
verbally and Name and Date of   
Birth confirmed by identification   
band.  
FALL SCREENING: Has the patient   
had 2 falls in the last year or 1   
fall with injury or currently   
using an Ambulatory Assistive   
Device (Walker, Cane, Wheelchair,   
Crutches, etc.)? Yes, Patient High   
Risk for Falls  
What interventions were put in   
place to prevent falls during this   
visit? Patient was under   
anesthetic- no walking or   
transfer.  
PATIENT GENDER DATA: Male  
PATIENT RELEVANT IMPLANT DATA   
REVIEWED: Yes  
  
RADIOLOGY DEPARTMENT: MR; Exam(s)   
Completed: Body: Liver (routine)  
  
PERIPHERAL IV DATA: Site   
assessment: Clean,Dry and Intact,   
Site disposition Left in for next   
appointment  
  
SIGNED BY: RT Justine(R)  
2023 1:01 PM  
  
  
Electronically signed by RT Justine(MERNA) at 2023 1:03   
PM EDT  
documented in this encounter            [facility]  
   
                                                    2023 History of   
Present illness Narrative               Formatting of this note might be   
different from the original.  
Radiology Service Progress Note  
  
PATIENT NAME: Mitesh Braun  
MRN: [id number]  
  
DATE OF SERVICE: 2023  
TIME: 1:32 PM  
  
PATIENT IDENTITY VERIFICATION   
COMPLETED USING TWO (2) STANDARD   
IDENTIFIERS: Name and Date of   
Birth confirmed by patient   
verbally and Name and Date of   
Birth confirmed by identification   
band.  
PATIENT GENDER DATA: Male  
PATIENT RELEVANT IMPLANT DATA   
REVIEWED: Yes  
ALLERGIES: Reviewed and unchanged  
MEDICATIONS REVIEWED: NO  
  
IV SITE: Ambulatory: A peripheral   
IV was started in the Left   
antecubital site with a Angio   
cath: 20 gauge. and A Saline lock   
was inserted per protocol  
PERIPHERAL IV ACCESS: Discontinued  
ANXIOLYSIS/ANESTHESIA: Please see   
Outpatient Preoperative Nursing   
Care Record and Anesthesia Record   
for further details.  
  
PATIENT DISCHARGED TO: Home/Self   
Care with daughter Dixie  
SIGNED BY: Yasmine Alvarez RN  
2023 1:32 PM  
  
  
Electronically signed by Yasmine Alvarez RN at 2023 1:33 PM   
EDT  
documented in this encounter            [facility]  
   
                                        2023 Nurse Note Formatting of this  
 note is   
different from the original.  
Radiology Service Progress Note  
  
DATE OF SERVICE: 2023  
TIME: 11:20 AM  
  
PATIENT WEIGHT: 241 LBS  
PATIENT IDENTITY VERIFICATION   
COMPLETED USING TWO (2) STANDARD   
IDENTIFIERS: Name and Date of   
Birth confirmed by patient   
verbally and Name and Date of   
Birth confirmed by identification   
band.  
FALL SCREENING: Has the patient   
had 2 falls in the last year or 1   
fall with injury or currently   
using an Ambulatory Assistive   
Device (Walker, Cane, Wheelchair,   
Crutches, etc.)? No  
PATIENT GENDER DATA: Male  
ALLERGIES: Reviewed and unchanged  
CONTRAST ALLERGY: No  
  
EXAM: MRI - CONTRAST TYPE: GROUP   
II  
IV SITE: Ambulatory: A peripheral   
IV was started in the Left   
antecubital site with a Angio   
cath: 20 gauge. and A Saline lock   
was inserted per protocol  
IV SITE APPEARANCE: Clean,Dry and   
Intact  
  
SIGNATURE: Zakiya Rojas RN   
PATIENT NAME: Mitesh Braun  
DATE: 2023 MRN: [id number]  
TIME: 11:20 AM  
  
Radiology Service Progress Note  
  
PATIENT NAME: Mitesh Braun  
MRN: [id number]  
  
DATE OF SERVICE: 2023  
TIME: 11:20 AM  
  
PATIENT IDENTITY VERIFICATION   
COMPLETED USING TWO (2) STANDARD   
IDENTIFIERS: Name and Date of   
Birth confirmed by patient   
verbally and Name and Date of   
Birth confirmed by identification   
band.  
PATIENT GENDER DATA: Male  
PATIENT RELEVANT IMPLANT DATA   
REVIEWED: Yes  
ALLERGIES: Reviewed and unchanged  
MEDICATIONS REVIEWED: YES  
  
IV SITE: Ambulatory: A peripheral   
IV was started in the Left   
antecubital site with a Angio   
cath: 20 gauge. and A Saline lock   
was inserted per protocol  
PERIPHERAL IV ACCESS: Discontinued  
ANXIOLYSIS/ANESTHESIA: Please see   
Outpatient Preoperative Nursing   
Care Record and Anesthesia Record   
for further details.  
  
PATIENT DISCHARGED TO: Home/Self   
Care  
SIGNED BY: Zakiya Rojas RN  
2023 11:20 AM  
  
  
Electronically signed by Zakiya Rojas RN at 2023 11:21   
AM EDT  
documented in this encounter            [facility]  
   
                                                    2023 Miscellaneous   
Notes                                   Formatting of this note might be   
different from the original.  
Radiology Service Pre Anesthesia   
Telephone Call  
  
PATIENT NAME: Mitesh Braun  
MRN: [id number]  
  
DATE OF CALL: 2023  
TIME: 4:13 PM  
  
PATIENT TYPE: Adult patient 1. Has   
the patient ever had an MRI scan   
before? Yes, claustrophobia  
2. Does the patient have any   
implanted devices? No If yes,   
notify patient that additional   
follow up will be required.,  
3. Has the patient had a history   
and physical within this 30 day   
period? Yes.  
4. Diet instructions given. Yes.   
No solids for 8 hours prior to   
exam. Patient may take medications   
with sips of water.,  
5. Has the patient had lab work   
within the last 6 months? Yes,  
6. Is the patient diabetic? No,  
7. Is the patient taking pain   
medication? No,  
8. Parking and Check-in   
instructions given? Yes,  
9. Does the patient have a    
to take them home? Yes,  
10. Spoke with patient: No, spoke   
with Oscar, and All questions   
were addressed and answered.   
Patient's daughter verbalized   
understanding.  
  
SIGNED BY: Dotty Arias RN  
2023 4:13 PM  
  
  
Electronically signed by Dotty Arias RN at 2023 4:24 PM   
EDT  
documented in this encounter            [facility]  
  
  
  
                                          
   
                                          
   
                                          
   
                                          
   
                                          
   
                                          
   
                                          
   
                                          
   
                                          
   
                                          
   
                                          
   
                                          
   
                                          
   
                                          
   
                                          
   
                                          
   
                                          
   
                                          
   
                                          
   
                                          
   
                                          
   
                                          
   
                                          
   
                                          
   
                                          
   
                                          
   
                                          
   
                                          
   
                                          
   
                                          
   
                                          
   
                                          
   
                                          
   
                                          
  
documented as of this encounter (statuses as of 2023)  
[facility]03- History of Past illness Narrative*   
  
                          Problem      Noted Date   Diagnosed Date Resolved Date  
   
                          Stasis ulcer 2023  
   
                          Obesity, Class II, BMI 35-39.9 2021  
   
                          Medicare annual wellness visit, subsequent 2019  
   
                                                    Encounter for long-term (cur  
rent) use of   
medications         2018  
   
                                                      
  
  
Overview:Formatting of this note might be different from the original.  
Added automatically from request for surgery [id number]  
   
   
                          History of colonic polyps 2018  
   
                                                      
  
  
Overview:Formatting of this note might be different from the original.  
Added automatically from request for surgery [id number]  
   
   
                          Gastroesophageal reflux disease 2018  
   
                                                      
  
  
Overview:Formatting of this note might be different from the original.  
Added automatically from request for surgery [id number]  
   
   
                          Acute pulmonary edema 10/05/2011                10/06/  
2011  
   
                                                      
  
  
Overview:Formatting of this note might be different from the original.  
10/5/2011  
cardiogenic and noncardiogenic factors  
  
   
   
                          Atelectasis  10/05/2011                10/06/2011  
   
                          Hypotension  10/04/2011                10/06/2011  
   
                                                      
  
  
Overview:Formatting of this note might be different from the original.  
10/4/2011  
goal map 65-80  
   
   
                          Stress hyperglycemia 10/04/2011                10/06/2  
011  
   
                                                      
  
  
Overview:Formatting of this note might be different from the original.  
10/4/2011  
Perioperative insulin resistance and exacerbation of hyperglycemia.  
Control blood glucose with titration of insulin infusion  
  
10/5/2011  
adequate control  
goal FBG<180  
  
   
   
                          Post-op pain 10/04/2011                10/06/2011  
   
                          Mechanically assisted ventilation 10/04/2011            
      10/05/2011  
   
                                                      
  
  
Overview:Formatting of this note might be different from the original.  
10/4/2011  
optimize MV settings, WTE  
current setting:FiO2, 75%, peep 8  
   
   
                          Cardiac insufficiency 10/04/2011                10/05/  
2011  
   
                                                      
  
  
Overview:Formatting of this note might be different from the original.  
10/4/2011  
RV mild to moderate post pump  
goal CI>2.3  
   
   
                          Hypoxemia    10/04/2011                10/06/2011  
   
                          discharge planning 2011                10/19/201  
1  
   
                                                      
  
  
Overview:Formatting of this note might be different from the original.  
age 71,  lives in [place], [place]. No DC needs.  
/  
   
   
                          Pre-op testing 2011                10/04/2011  
   
                                                      
  
  
Overview:Formatting of this note is different from the original.  
Images from the original note were not included.  
[facility]  
PRE-OP CHECKLIST  
  
Surgeon: Deangelo Angulo M.D. Informed Consent Completed: No  
STS Score: 1.4%  
CAD: Yes - CAD on Problem List: Yes  
Is intended procedure a CABG: Yes - is a beta blocker ordered? Yes  
  
H & P completed: Yes  
  
PA/LAT: Completed CT: Completed MRI: N/A LE US: N/A  
  
Cath: Yes - reviewed: Yes Echo:Completed EKG: Completed EF %: 61  
  
PI's: N/A Carotid: Completed Mapping: N/A Dental: Completed PFT's: N/A  
  
Basename 11 0729  
WBC 7.18  
HB 13.1  
HCT 41.8  
  
INR 1.0  
CREAT 1.35  
  
UA: Normal  
HCG:N/A  
  
ABO/ABO Confirmed: Yes  
  
Blood ordered: No  
  
SA Swab: Yes - results: Pending  
  
Last Dose of Anticoagulation: vitamins  
  
Op Note: N/A  
  
Pacemaker Check: N/A  
  
Consults: GI 2001  
  
DM: No  
  
Cardiac Surgical prep: No  
  
SIGNATURE: Krystal Castellanos RN CHECKED BY:  
DATE of SERVICE: 2011  
TIME of SERVICE: 2:23 PM  
  
  
   
   
                          Anemia of chronic disease 2011  
   
                          Intestinal polyp 2011  
   
                                                      
  
  
Overview:Formatting of this note might be different from the original.  
Distal ileum ulcerated polyp.  
   
   
                                                    Nonspecific abnormal results  
 of liver   
function study      2006  
   
                          Impotence of organic origin 2006  
   
                                                    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  
9.9 (morbid   
obesity)                                                    2021  
  
documented as of this encounter (statuses as of 2023)  
[facility]03- History of Past illness Narrative*   
  
                          Problem      Noted Date   Diagnosed Date Resolved Date  
   
                          Stasis ulcer 2023  
   
                          Obesity, Class II, BMI 35-39.9 2021  
   
                          Medicare annual wellness visit, subsequent 2019  
   
                                                    Encounter for long-term (cur  
rent) use of   
medications         2018  
   
                                                      
  
  
Overview:Formatting of this note might be different from the original.  
Added automatically from request for surgery [id number]  
   
   
                          History of colonic polyps 2018  
   
                                                      
  
  
Overview:Formatting of this note might be different from the original.  
Added automatically from request for surgery [id number]  
   
   
                          Gastroesophageal reflux disease 2018  
   
                                                      
  
  
Overview:Formatting of this note might be different from the original.  
Added automatically from request for surgery [id number]  
   
   
                          Acute pulmonary edema 10/05/2011                10/06/  
2011  
   
                                                      
  
  
Overview:Formatting of this note might be different from the original.  
10/5/2011  
cardiogenic and noncardiogenic factors  
  
   
   
                          Atelectasis  10/05/2011                10/06/2011  
   
                          Hypotension  10/04/2011                10/06/2011  
   
                                                      
  
  
Overview:Formatting of this note might be different from the original.  
10/4/2011  
goal map 65-80  
   
   
                          Stress hyperglycemia 10/04/2011                10/06/2  
011  
   
                                                      
  
  
Overview:Formatting of this note might be different from the original.  
10/4/2011  
Perioperative insulin resistance and exacerbation of hyperglycemia.  
Control blood glucose with titration of insulin infusion  
  
10/5/2011  
adequate control  
goal FBG<180  
  
   
   
                          Post-op pain 10/04/2011                10/06/2011  
   
                          Mechanically assisted ventilation 10/04/2011            
      10/05/2011  
   
                                                      
  
  
Overview:Formatting of this note might be different from the original.  
10/4/2011  
optimize MV settings, WTE  
current setting:FiO2, 75%, peep 8  
   
   
                          Cardiac insufficiency 10/04/2011                10/05/  
2011  
   
                                                      
  
  
Overview:Formatting of this note might be different from the original.  
10/4/2011  
RV mild to moderate post pump  
goal CI>2.3  
   
   
                          Hypoxemia    10/04/2011                10/06/2011  
   
                          discharge planning 2011                10/19/201  
1  
   
                                                      
  
  
Overview:Formatting of this note might be different from the original.  
age 71,  lives in [place], [place]. No DC needs.  
/  
   
   
                          Pre-op testing 2011                10/04/2011  
   
                                                      
  
  
Overview:Formatting of this note is different from the original.  
Images from the original note were not included.  
[facility]  
PRE-OP CHECKLIST  
  
Surgeon: Deangelo Angulo M.D. Informed Consent Completed: No  
STS Score: 1.4%  
CAD: Yes - CAD on Problem List: Yes  
Is intended procedure a CABG: Yes - is a beta blocker ordered? Yes  
  
H & P completed: Yes  
  
PA/LAT: Completed CT: Completed MRI: N/A LE US: N/A  
  
Cath: Yes - reviewed: Yes Echo:Completed EKG: Completed EF %: 61  
  
PI's: N/A Carotid: Completed Mapping: N/A Dental: Completed PFT's: N/A  
  
Basename 11 0729  
WBC 7.18  
HB 13.1  
HCT 41.8  
  
INR 1.0  
CREAT 1.35  
  
UA: Normal  
HCG:N/A  
  
ABO/ABO Confirmed: Yes  
  
Blood ordered: No  
  
SA Swab: Yes - results: Pending  
  
Last Dose of Anticoagulation: vitamins  
  
Op Note: N/A  
  
Pacemaker Check: N/A  
  
Consults: GI 2001  
  
DM: No  
  
Cardiac Surgical prep: No  
  
SIGNATURE: Krystal Castellanos RN CHECKED BY:  
DATE of SERVICE: 2011  
TIME of SERVICE: 2:23 PM  
  
  
   
   
                          Anemia of chronic disease 2011  
   
                          Intestinal polyp 2011  
   
                                                      
  
  
Overview:Formatting of this note might be different from the original.  
Distal ileum ulcerated polyp.  
   
   
                                                    Nonspecific abnormal results  
 of liver   
function study      2006  
   
                          Impotence of organic origin 2006  
   
                                                    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  
9.9 (morbid   
obesity)                                                    2021  
  
documented as of this encounter (statuses as of 2023)  
[facility]03- History of Past illness Narrative*   
  
                          Problem      Noted Date   Diagnosed Date Resolved Date  
   
                          Stasis ulcer 2023  
   
                          Obesity, Class II, BMI 35-39.9 2021  
   
                          Medicare annual wellness visit, subsequent 2019  
   
                                                    Encounter for long-term (cur  
rent) use of   
medications         2018  
   
                                                      
  
  
Overview:Formatting of this note might be different from the original.  
Added automatically from request for surgery [id number]  
   
   
                          History of colonic polyps 2018  
   
                                                      
  
  
Overview:Formatting of this note might be different from the original.  
Added automatically from request for surgery [id number]  
   
   
                          Gastroesophageal reflux disease 2018  
   
                                                      
  
  
Overview:Formatting of this note might be different from the original.  
Added automatically from request for surgery [id number]  
   
   
                          Acute pulmonary edema 10/05/2011                10/06/  
2011  
   
                                                      
  
  
Overview:Formatting of this note might be different from the original.  
10/5/2011  
cardiogenic and noncardiogenic factors  
  
   
   
                          Atelectasis  10/05/2011                10/06/2011  
   
                          Hypotension  10/04/2011                10/06/2011  
   
                                                      
  
  
Overview:Formatting of this note might be different from the original.  
10/4/2011  
goal map 65-80  
   
   
                          Stress hyperglycemia 10/04/2011                10/06/2  
011  
   
                                                      
  
  
Overview:Formatting of this note might be different from the original.  
10/4/2011  
Perioperative insulin resistance and exacerbation of hyperglycemia.  
Control blood glucose with titration of insulin infusion  
  
10/5/2011  
adequate control  
goal FBG<180  
  
   
   
                          Post-op pain 10/04/2011                10/06/2011  
   
                          Mechanically assisted ventilation 10/04/2011            
      10/05/2011  
   
                                                      
  
  
Overview:Formatting of this note might be different from the original.  
10/4/2011  
optimize MV settings, WTE  
current setting:FiO2, 75%, peep 8  
   
   
                          Cardiac insufficiency 10/04/2011                10/05/  
2011  
   
                                                      
  
  
Overview:Formatting of this note might be different from the original.  
10/4/2011  
RV mild to moderate post pump  
goal CI>2.3  
   
   
                          Hypoxemia    10/04/2011                10/06/2011  
   
                          discharge planning 2011                10/19/201  
1  
   
                                                      
  
  
Overview:Formatting of this note might be different from the original.  
age 71,  lives in [place], [place]. No DC needs.  
/  
   
   
                          Pre-op testing 2011                10/04/2011  
   
                                                      
  
  
Overview:Formatting of this note is different from the original.  
Images from the original note were not included.  
[facility]  
PRE-OP CHECKLIST  
  
Surgeon: Deangelo Angulo M.D. Informed Consent Completed: No  
STS Score: 1.4%  
CAD: Yes - CAD on Problem List: Yes  
Is intended procedure a CABG: Yes - is a beta blocker ordered? Yes  
  
H & P completed: Yes  
  
PA/LAT: Completed CT: Completed MRI: N/A LE US: N/A  
  
Cath: Yes - reviewed: Yes Echo:Completed EKG: Completed EF %: 61  
  
PI's: N/A Carotid: Completed Mapping: N/A Dental: Completed PFT's: N/A  
  
Basename 11 0729  
WBC 7.18  
HB 13.1  
HCT 41.8  
  
INR 1.0  
CREAT 1.35  
  
UA: Normal  
HCG:N/A  
  
ABO/ABO Confirmed: Yes  
  
Blood ordered: No  
  
SA Swab: Yes - results: Pending  
  
Last Dose of Anticoagulation: vitamins  
  
Op Note: N/A  
  
Pacemaker Check: N/A  
  
Consults: GI 2001  
  
DM: No  
  
Cardiac Surgical prep: No  
  
SIGNATURE: Krystal Castellanos RN CHECKED BY:  
DATE of SERVICE: 2011  
TIME of SERVICE: 2:23 PM  
  
  
   
   
                          Anemia of chronic disease 2011  
   
                          Intestinal polyp 2011  
   
                                                      
  
  
Overview:Formatting of this note might be different from the original.  
Distal ileum ulcerated polyp.  
   
   
                                                    Nonspecific abnormal results  
 of liver   
function study      2006  
   
                          Impotence of organic origin 2006  
   
                                                    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  
9.9 (morbid   
obesity)                                                    2021  
  
documented as of this encounter (statuses as of 2023)  
[facility]03- History of Past illness Narrative*   
  
                          Problem      Noted Date   Diagnosed Date Resolved Date  
   
                          Stasis ulcer 2023  
   
                          Obesity, Class II, BMI 35-39.9 2021  
   
                          Medicare annual wellness visit, subsequent 2019  
   
                                                    Encounter for long-term (cur  
rent) use of   
medications         2018  
   
                                                      
  
  
Overview:Formatting of this note might be different from the original.  
Added automatically from request for surgery [id number]  
   
   
                          History of colonic polyps 2018  
   
                                                      
  
  
Overview:Formatting of this note might be different from the original.  
Added automatically from request for surgery [id number]  
   
   
                          Gastroesophageal reflux disease 2018  
   
                                                      
  
  
Overview:Formatting of this note might be different from the original.  
Added automatically from request for surgery [id number]  
   
   
                          Acute pulmonary edema 10/05/2011                10/06/  
2011  
   
                                                      
  
  
Overview:Formatting of this note might be different from the original.  
10/5/2011  
cardiogenic and noncardiogenic factors  
  
   
   
                          Atelectasis  10/05/2011                10/06/2011  
   
                          Hypotension  10/04/2011                10/06/2011  
   
                                                      
  
  
Overview:Formatting of this note might be different from the original.  
10/4/2011  
goal map 65-80  
   
   
                          Stress hyperglycemia 10/04/2011                10/06/2  
011  
   
                                                      
  
  
Overview:Formatting of this note might be different from the original.  
10/4/2011  
Perioperative insulin resistance and exacerbation of hyperglycemia.  
Control blood glucose with titration of insulin infusion  
  
10/5/2011  
adequate control  
goal FBG<180  
  
   
   
                          Post-op pain 10/04/2011                10/06/2011  
   
                          Mechanically assisted ventilation 10/04/2011            
      10/05/2011  
   
                                                      
  
  
Overview:Formatting of this note might be different from the original.  
10/4/2011  
optimize MV settings, WTE  
current setting:FiO2, 75%, peep 8  
   
   
                          Cardiac insufficiency 10/04/2011                10/05/  
2011  
   
                                                      
  
  
Overview:Formatting of this note might be different from the original.  
10/4/2011  
RV mild to moderate post pump  
goal CI>2.3  
   
   
                          Hypoxemia    10/04/2011                10/06/2011  
   
                          discharge planning 2011                10/19/201  
1  
   
                                                      
  
  
Overview:Formatting of this note might be different from the original.  
age 71,  lives in [place], [place]. No DC needs.  
/  
   
   
                          Pre-op testing 2011                10/04/2011  
   
                                                      
  
  
Overview:Formatting of this note is different from the original.  
Images from the original note were not included.  
[facility]  
PRE-OP CHECKLIST  
  
Surgeon: Deangelo Angulo M.D. Informed Consent Completed: No  
STS Score: 1.4%  
CAD: Yes - CAD on Problem List: Yes  
Is intended procedure a CABG: Yes - is a beta blocker ordered? Yes  
  
H & P completed: Yes  
  
PA/LAT: Completed CT: Completed MRI: N/A LE US: N/A  
  
Cath: Yes - reviewed: Yes Echo:Completed EKG: Completed EF %: 61  
  
PI's: N/A Carotid: Completed Mapping: N/A Dental: Completed PFT's: N/A  
  
Basename 11 0729  
WBC 7.18  
HB 13.1  
HCT 41.8  
  
INR 1.0  
CREAT 1.35  
  
UA: Normal  
HCG:N/A  
  
ABO/ABO Confirmed: Yes  
  
Blood ordered: No  
  
SA Swab: Yes - results: Pending  
  
Last Dose of Anticoagulation: vitamins  
  
Op Note: N/A  
  
Pacemaker Check: N/A  
  
Consults: GI 2001  
  
DM: No  
  
Cardiac Surgical prep: No  
  
SIGNATURE: Krystal Castellanos RN CHECKED BY:  
DATE of SERVICE: 2011  
TIME of SERVICE: 2:23 PM  
  
  
   
   
                          Anemia of chronic disease 2011  
   
                          Intestinal polyp 2011  
   
                                                      
  
  
Overview:Formatting of this note might be different from the original.  
Distal ileum ulcerated polyp.  
   
   
                                                    Nonspecific abnormal results  
 of liver   
function study      2006  
   
                          Impotence of organic origin 2006  
   
                                                    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  
9.9 (morbid   
obesity)                                                    2021  
  
documented as of this encounter (statuses as of 2023)  
[facility]03- History of Past illness Narrative*   
  
                          Problem      Noted Date   Diagnosed Date Resolved Date  
   
                          Stasis ulcer 2023  
   
                          Obesity, Class II, BMI 35-39.9 2021  
   
                          Medicare annual wellness visit, subsequent 2019  
   
                                                    Encounter for long-term (cur  
rent) use of   
medications         2018  
   
                                                      
  
  
Overview:Formatting of this note might be different from the original.  
Added automatically from request for surgery [id number]  
   
   
                          History of colonic polyps 2018  
   
                                                      
  
  
Overview:Formatting of this note might be different from the original.  
Added automatically from request for surgery [id number]  
   
   
                          Gastroesophageal reflux disease 2018  
   
                                                      
  
  
Overview:Formatting of this note might be different from the original.  
Added automatically from request for surgery [id number]  
   
   
                          Acute pulmonary edema 10/05/2011                10/06/  
2011  
   
                                                      
  
  
Overview:Formatting of this note might be different from the original.  
10/5/2011  
cardiogenic and noncardiogenic factors  
  
   
   
                          Atelectasis  10/05/2011                10/06/2011  
   
                          Hypotension  10/04/2011                10/06/2011  
   
                                                      
  
  
Overview:Formatting of this note might be different from the original.  
10/4/2011  
goal map 65-80  
   
   
                          Stress hyperglycemia 10/04/2011                10/06/2  
011  
   
                                                      
  
  
Overview:Formatting of this note might be different from the original.  
10/4/2011  
Perioperative insulin resistance and exacerbation of hyperglycemia.  
Control blood glucose with titration of insulin infusion  
  
10/5/2011  
adequate control  
goal FBG<180  
  
   
   
                          Post-op pain 10/04/2011                10/06/2011  
   
                          Mechanically assisted ventilation 10/04/2011            
      10/05/2011  
   
                                                      
  
  
Overview:Formatting of this note might be different from the original.  
10/4/2011  
optimize MV settings, WTE  
current setting:FiO2, 75%, peep 8  
   
   
                          Cardiac insufficiency 10/04/2011                10/05/  
2011  
   
                                                      
  
  
Overview:Formatting of this note might be different from the original.  
10/4/2011  
RV mild to moderate post pump  
goal CI>2.3  
   
   
                          Hypoxemia    10/04/2011                10/06/2011  
   
                          discharge planning 2011                10/19/201  
1  
   
                                                      
  
  
Overview:Formatting of this note might be different from the original.  
age 71,  lives in [place]. No DC needs.  
/  
   
   
                          Pre-op testing 2011                10/04/2011  
   
                                                      
  
  
Overview:Formatting of this note is different from the original.  
Images from the original note were not included.  
[facility]  
PRE-OP CHECKLIST  
  
Surgeon: Deangelo Angulo M.D. Informed Consent Completed: No  
STS Score: 1.4%  
CAD: Yes - CAD on Problem List: Yes  
Is intended procedure a CABG: Yes - is a beta blocker ordered? Yes  
  
H & P completed: Yes  
  
PA/LAT: Completed CT: Completed MRI: N/A LE US: N/A  
  
Cath: Yes - reviewed: Yes Echo:Completed EKG: Completed EF %: 61  
  
PI's: N/A Carotid: Completed Mapping: N/A Dental: Completed PFT's: N/A  
  
Basename 11 0729  
WBC 7.18  
HB 13.1  
HCT 41.8  
  
INR 1.0  
CREAT 1.35  
  
UA: Normal  
HCG:N/A  
  
ABO/ABO Confirmed: Yes  
  
Blood ordered: No  
  
SA Swab: Yes - results: Pending  
  
Last Dose of Anticoagulation: vitamins  
  
Op Note: N/A  
  
Pacemaker Check: N/A  
  
Consults: GI 2001  
  
DM: No  
  
Cardiac Surgical prep: No  
  
SIGNATURE: Krystal Castellanos RN CHECKED BY:  
DATE of SERVICE: 2011  
TIME of SERVICE: 2:23 PM  
  
  
   
   
                          Anemia of chronic disease 2011  
   
                          Intestinal polyp 2011  
   
                                                      
  
  
Overview:Formatting of this note might be different from the original.  
Distal ileum ulcerated polyp.  
   
   
                                                    Nonspecific abnormal results  
 of liver   
function study      2006  
   
                          Impotence of organic origin 2006  
   
                                                    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  
9.9 (morbid   
obesity)                                                    2021  
  
documented as of this encounter (statuses as of 2023)  
[facility]03- History of Past illness Narrative*   
  
                          Problem      Noted Date   Diagnosed Date Resolved Date  
   
                          Stasis ulcer 2023  
   
                          Obesity, Class II, BMI 35-39.9 2021  
   
                          Medicare annual wellness visit, subsequent 2019  
   
                                                    Encounter for long-term (cur  
rent) use of   
medications         2018  
   
                                                      
  
  
Overview:Formatting of this note might be different from the original.  
Added automatically from request for surgery [id number]  
   
   
                          History of colonic polyps 2018  
   
                                                      
  
  
Overview:Formatting of this note might be different from the original.  
Added automatically from request for surgery [id number]  
   
   
                          Gastroesophageal reflux disease 2018  
   
                                                      
  
  
Overview:Formatting of this note might be different from the original.  
Added automatically from request for surgery [id number]  
   
   
                          Acute pulmonary edema 10/05/2011                10/06/  
2011  
   
                                                      
  
  
Overview:Formatting of this note might be different from the original.  
10/5/2011  
cardiogenic and noncardiogenic factors  
  
   
   
                          Atelectasis  10/05/2011                10/06/2011  
   
                          Hypotension  10/04/2011                10/06/2011  
   
                                                      
  
  
Overview:Formatting of this note might be different from the original.  
10/4/2011  
goal map 65-80  
   
   
                          Stress hyperglycemia 10/04/2011                10/06/2  
011  
   
                                                      
  
  
Overview:Formatting of this note might be different from the original.  
10/4/2011  
Perioperative insulin resistance and exacerbation of hyperglycemia.  
Control blood glucose with titration of insulin infusion  
  
10/5/2011  
adequate control  
goal FBG<180  
  
   
   
                          Post-op pain 10/04/2011                10/06/2011  
   
                          Mechanically assisted ventilation 10/04/2011            
      10/05/2011  
   
                                                      
  
  
Overview:Formatting of this note might be different from the original.  
10/4/2011  
optimize MV settings, WTE  
current setting:FiO2, 75%, peep 8  
   
   
                          Cardiac insufficiency 10/04/2011                10/05/  
2011  
   
                                                      
  
  
Overview:Formatting of this note might be different from the original.  
10/4/2011  
RV mild to moderate post pump  
goal CI>2.3  
   
   
                          Hypoxemia    10/04/2011                10/06/2011  
   
                          discharge planning 2011                10/19/201  
1  
   
                                                      
  
  
Overview:Formatting of this note might be different from the original.  
age 71,  lives in [place]. No DC needs.  
/  
   
   
                          Pre-op testing 2011                10/04/2011  
   
                                                      
  
  
Overview:Formatting of this note is different from the original.  
Images from the original note were not included.  
[facility]  
PRE-OP CHECKLIST  
  
Surgeon: Deangelo Angulo M.D. Informed Consent Completed: No  
STS Score: 1.4%  
CAD: Yes - CAD on Problem List: Yes  
Is intended procedure a CABG: Yes - is a beta blocker ordered? Yes  
  
H & P completed: Yes  
  
PA/LAT: Completed CT: Completed MRI: N/A LE US: N/A  
  
Cath: Yes - reviewed: Yes Echo:Completed EKG: Completed EF %: 61  
  
PI's: N/A Carotid: Completed Mapping: N/A Dental: Completed PFT's: N/A  
  
Basename 11 0729  
WBC 7.18  
HB 13.1  
HCT 41.8  
  
INR 1.0  
CREAT 1.35  
  
UA: Normal  
HCG:N/A  
  
ABO/ABO Confirmed: Yes  
  
Blood ordered: No  
  
SA Swab: Yes - results: Pending  
  
Last Dose of Anticoagulation: vitamins  
  
Op Note: N/A  
  
Pacemaker Check: N/A  
  
Consults: GI 2001  
  
DM: No  
  
Cardiac Surgical prep: No  
  
SIGNATURE: Krystal Castellanos RN CHECKED BY:  
DATE of SERVICE: 2011  
TIME of SERVICE: 2:23 PM  
  
  
   
   
                          Anemia of chronic disease 2011  
   
                          Intestinal polyp 2011  
   
                                                      
  
  
Overview:Formatting of this note might be different from the original.  
Distal ileum ulcerated polyp.  
   
   
                                                    Nonspecific abnormal results  
 of liver   
function study      2006  
   
                          Impotence of organic origin 2006  
   
                                                    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  
9.9 (morbid   
obesity)                                                    2021  
  
documented as of this encounter (statuses as of 2023)  
[facility]03- History of Past illness Narrative*   
  
                          Problem      Noted Date   Diagnosed Date Resolved Date  
   
                          Stasis ulcer 2023  
   
                          Obesity, Class II, BMI 35-39.9 2021  
   
                          Medicare annual wellness visit, subsequent 2019  
   
                                                    Encounter for long-term (cur  
rent) use of   
medications         2018  
   
                                                      
  
  
Overview:Formatting of this note might be different from the original.  
Added automatically from request for surgery [id number]  
   
   
                          History of colonic polyps 2018  
   
                                                      
  
  
Overview:Formatting of this note might be different from the original.  
Added automatically from request for surgery [id number]  
   
   
                          Gastroesophageal reflux disease 2018  
   
                                                      
  
  
Overview:Formatting of this note might be different from the original.  
Added automatically from request for surgery [id number]  
   
   
                          Acute pulmonary edema 10/05/2011                10/06/  
2011  
   
                                                      
  
  
Overview:Formatting of this note might be different from the original.  
10/5/2011  
cardiogenic and noncardiogenic factors  
  
   
   
                          Atelectasis  10/05/2011                10/06/2011  
   
                          Hypotension  10/04/2011                10/06/2011  
   
                                                      
  
  
Overview:Formatting of this note might be different from the original.  
10/4/2011  
goal map 65-80  
   
   
                          Stress hyperglycemia 10/04/2011                10/06/2  
011  
   
                                                      
  
  
Overview:Formatting of this note might be different from the original.  
10/4/2011  
Perioperative insulin resistance and exacerbation of hyperglycemia.  
Control blood glucose with titration of insulin infusion  
  
10/5/2011  
adequate control  
goal FBG<180  
  
   
   
                          Post-op pain 10/04/2011                10/06/2011  
   
                          Mechanically assisted ventilation 10/04/2011            
      10/05/2011  
   
                                                      
  
  
Overview:Formatting of this note might be different from the original.  
10/4/2011  
optimize MV settings, WTE  
current setting:FiO2, 75%, peep 8  
   
   
                          Cardiac insufficiency 10/04/2011                10/05/  
2011  
   
                                                      
  
  
Overview:Formatting of this note might be different from the original.  
10/4/2011  
RV mild to moderate post pump  
goal CI>2.3  
   
   
                          Hypoxemia    10/04/2011                10/06/2011  
   
                          discharge planning 2011                10/19/201  
1  
   
                                                      
  
  
Overview:Formatting of this note might be different from the original.  
age 71,  lives in [place], [place]. No DC needs.  
/  
   
   
                          Pre-op testing 2011                10/04/2011  
   
                                                      
  
  
Overview:Formatting of this note is different from the original.  
Images from the original note were not included.  
[facility]  
PRE-OP CHECKLIST  
  
Surgeon: Deangelo Angulo M.D. Informed Consent Completed: No  
STS Score: 1.4%  
CAD: Yes - CAD on Problem List: Yes  
Is intended procedure a CABG: Yes - is a beta blocker ordered? Yes  
  
H & P completed: Yes  
  
PA/LAT: Completed CT: Completed MRI: N/A LE US: N/A  
  
Cath: Yes - reviewed: Yes Echo:Completed EKG: Completed EF %: 61  
  
PI's: N/A Carotid: Completed Mapping: N/A Dental: Completed PFT's: N/A  
  
Basename 11 0729  
WBC 7.18  
HB 13.1  
HCT 41.8  
  
INR 1.0  
CREAT 1.35  
  
UA: Normal  
HCG:N/A  
  
ABO/ABO Confirmed: Yes  
  
Blood ordered: No  
  
SA Swab: Yes - results: Pending  
  
Last Dose of Anticoagulation: vitamins  
  
Op Note: N/A  
  
Pacemaker Check: N/A  
  
Consults: GI 2001  
  
DM: No  
  
Cardiac Surgical prep: No  
  
SIGNATURE: Krystal Castellanos RN CHECKED BY:  
DATE of SERVICE: 2011  
TIME of SERVICE: 2:23 PM  
  
  
   
   
                          Anemia of chronic disease 2011  
   
                          Intestinal polyp 2011  
   
                                                      
  
  
Overview:Formatting of this note might be different from the original.  
Distal ileum ulcerated polyp.  
   
   
                                                    Nonspecific abnormal results  
 of liver   
function study      2006  
   
                          Impotence of organic origin 2006  
   
                                                    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  
9.9 (morbid   
obesity)                                                    2021  
  
documented as of this encounter (statuses as of 2023)  
[facility]03- History of Past illness Narrative*   
  
                          Problem      Noted Date   Diagnosed Date Resolved Date  
   
                          Stasis ulcer 2023  
   
                          Obesity, Class II, BMI 35-39.9 2021  
   
                          Medicare annual wellness visit, subsequent 2019  
   
                                                    Encounter for long-term (cur  
rent) use of   
medications         2018  
   
                                                      
  
  
Overview:Formatting of this note might be different from the original.  
Added automatically from request for surgery [id number]  
   
   
                          History of colonic polyps 2018  
   
                                                      
  
  
Overview:Formatting of this note might be different from the original.  
Added automatically from request for surgery [id number]  
   
   
                          Gastroesophageal reflux disease 2018  
   
                                                      
  
  
Overview:Formatting of this note might be different from the original.  
Added automatically from request for surgery [id number]  
   
   
                          Acute pulmonary edema 10/05/2011                10/06/  
2011  
   
                                                      
  
  
Overview:Formatting of this note might be different from the original.  
10/5/2011  
cardiogenic and noncardiogenic factors  
  
   
   
                          Atelectasis  10/05/2011                10/06/2011  
   
                          Hypotension  10/04/2011                10/06/2011  
   
                                                      
  
  
Overview:Formatting of this note might be different from the original.  
10/4/2011  
goal map 65-80  
   
   
                          Stress hyperglycemia 10/04/2011                10/06/2  
011  
   
                                                      
  
  
Overview:Formatting of this note might be different from the original.  
10/4/2011  
Perioperative insulin resistance and exacerbation of hyperglycemia.  
Control blood glucose with titration of insulin infusion  
  
10/5/2011  
adequate control  
goal FBG<180  
  
   
   
                          Post-op pain 10/04/2011                10/06/2011  
   
                          Mechanically assisted ventilation 10/04/2011            
      10/05/2011  
   
                                                      
  
  
Overview:Formatting of this note might be different from the original.  
10/4/2011  
optimize MV settings, WTE  
current setting:FiO2, 75%, peep 8  
   
   
                          Cardiac insufficiency 10/04/2011                10/05/  
2011  
   
                                                      
  
  
Overview:Formatting of this note might be different from the original.  
10/4/2011  
RV mild to moderate post pump  
goal CI>2.3  
   
   
                          Hypoxemia    10/04/2011                10/06/2011  
   
                          discharge planning 2011                10/19/201  
1  
   
                                                      
  
  
Overview:Formatting of this note might be different from the original.  
age 71,  lives in [place], [place]. No DC needs.  
/  
   
   
                          Pre-op testing 2011                10/04/2011  
   
                                                      
  
  
Overview:Formatting of this note is different from the original.  
Images from the original note were not included.  
[facility]  
PRE-OP CHECKLIST  
  
Surgeon: Deangelo Angulo M.D. Informed Consent Completed: No  
STS Score: 1.4%  
CAD: Yes - CAD on Problem List: Yes  
Is intended procedure a CABG: Yes - is a beta blocker ordered? Yes  
  
H & P completed: Yes  
  
PA/LAT: Completed CT: Completed MRI: N/A LE US: N/A  
  
Cath: Yes - reviewed: Yes Echo:Completed EKG: Completed EF %: 61  
  
PI's: N/A Carotid: Completed Mapping: N/A Dental: Completed PFT's: N/A  
  
Basename 11 0729  
WBC 7.18  
HB 13.1  
HCT 41.8  
  
INR 1.0  
CREAT 1.35  
  
UA: Normal  
HCG:N/A  
  
ABO/ABO Confirmed: Yes  
  
Blood ordered: No  
  
SA Swab: Yes - results: Pending  
  
Last Dose of Anticoagulation: vitamins  
  
Op Note: N/A  
  
Pacemaker Check: N/A  
  
Consults: GI 2001  
  
DM: No  
  
Cardiac Surgical prep: No  
  
SIGNATURE: Krystal Castellanos RN CHECKED BY:  
DATE of SERVICE: 2011  
TIME of SERVICE: 2:23 PM  
  
  
   
   
                          Anemia of chronic disease 2011  
   
                          Intestinal polyp 2011  
   
                                                      
  
  
Overview:Formatting of this note might be different from the original.  
Distal ileum ulcerated polyp.  
   
   
                                                    Nonspecific abnormal results  
 of liver   
function study      2006  
   
                          Impotence of organic origin 2006  
   
                                                    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  
9.9 (morbid   
obesity)                                                    2021  
  
documented as of this encounter (statuses as of 2023)  
[facility]03- History of Past illness Narrative*   
  
                          Problem      Noted Date   Diagnosed Date Resolved Date  
   
                          Stasis ulcer 2023  
   
                          Obesity, Class II, BMI 35-39.9 2021  
   
                          Medicare annual wellness visit, subsequent 2019  
   
                                                    Encounter for long-term (cur  
rent) use of   
medications         2018  
   
                                                      
  
  
Overview:Formatting of this note might be different from the original.  
Added automatically from request for surgery [id number]  
   
   
                          History of colonic polyps 2018  
   
                                                      
  
  
Overview:Formatting of this note might be different from the original.  
Added automatically from request for surgery [id number]  
   
   
                          Gastroesophageal reflux disease 2018  
   
                                                      
  
  
Overview:Formatting of this note might be different from the original.  
Added automatically from request for surgery [id number]  
   
   
                          Acute pulmonary edema 10/05/2011                10/06/  
2011  
   
                                                      
  
  
Overview:Formatting of this note might be different from the original.  
10/5/2011  
cardiogenic and noncardiogenic factors  
  
   
   
                          Atelectasis  10/05/2011                10/06/2011  
   
                          Hypotension  10/04/2011                10/06/2011  
   
                                                      
  
  
Overview:Formatting of this note might be different from the original.  
10/4/2011  
goal map 65-80  
   
   
                          Stress hyperglycemia 10/04/2011                10/06/2  
011  
   
                                                      
  
  
Overview:Formatting of this note might be different from the original.  
10/4/2011  
Perioperative insulin resistance and exacerbation of hyperglycemia.  
Control blood glucose with titration of insulin infusion  
  
10/5/2011  
adequate control  
goal FBG<180  
  
   
   
                          Post-op pain 10/04/2011                10/06/2011  
   
                          Mechanically assisted ventilation 10/04/2011            
      10/05/2011  
   
                                                      
  
  
Overview:Formatting of this note might be different from the original.  
10/4/2011  
optimize MV settings, WTE  
current setting:FiO2, 75%, peep 8  
   
   
                          Cardiac insufficiency 10/04/2011                10/05/  
2011  
   
                                                      
  
  
Overview:Formatting of this note might be different from the original.  
10/4/2011  
RV mild to moderate post pump  
goal CI>2.3  
   
   
                          Hypoxemia    10/04/2011                10/06/2011  
   
                          discharge planning 2011                10/19/201  
1  
   
                                                      
  
  
Overview:Formatting of this note might be different from the original.  
age 71,  lives in [place], [place]. No DC needs.  
/  
   
   
                          Pre-op testing 2011                10/04/2011  
   
                                                      
  
  
Overview:Formatting of this note is different from the original.  
Images from the original note were not included.  
[facility]  
PRE-OP CHECKLIST  
  
Surgeon: Deangelo Angulo M.D. Informed Consent Completed: No  
STS Score: 1.4%  
CAD: Yes - CAD on Problem List: Yes  
Is intended procedure a CABG: Yes - is a beta blocker ordered? Yes  
  
H & P completed: Yes  
  
PA/LAT: Completed CT: Completed MRI: N/A LE US: N/A  
  
Cath: Yes - reviewed: Yes Echo:Completed EKG: Completed EF %: 61  
  
PI's: N/A Carotid: Completed Mapping: N/A Dental: Completed PFT's: N/A  
  
Basename 11 0729  
WBC 7.18  
HB 13.1  
HCT 41.8  
  
INR 1.0  
CREAT 1.35  
  
UA: Normal  
HCG:N/A  
  
ABO/ABO Confirmed: Yes  
  
Blood ordered: No  
  
SA Swab: Yes - results: Pending  
  
Last Dose of Anticoagulation: vitamins  
  
Op Note: N/A  
  
Pacemaker Check: N/A  
  
Consults: GI 2001  
  
DM: No  
  
Cardiac Surgical prep: No  
  
SIGNATURE: Krystal Castellanos RN CHECKED BY:  
DATE of SERVICE: 2011  
TIME of SERVICE: 2:23 PM  
  
  
   
   
                          Anemia of chronic disease 2011  
   
                          Intestinal polyp 2011  
   
                                                      
  
  
Overview:Formatting of this note might be different from the original.  
Distal ileum ulcerated polyp.  
   
   
                                                    Nonspecific abnormal results  
 of liver   
function study      2006  
   
                          Impotence of organic origin 2006  
   
                                                    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  
9.9 (morbid   
obesity)                                                    2021  
  
documented as of this encounter (statuses as of 2023)  
[facility]03- History of Past illness Narrative*   
  
                          Problem      Noted Date   Diagnosed Date Resolved Date  
   
                          Stasis ulcer 2023  
   
                          Obesity, Class II, BMI 35-39.9 2021  
   
                          Medicare annual wellness visit, subsequent 2019  
   
                                                    Encounter for long-term (cur  
rent) use of   
medications         2018  
   
                                                      
  
  
Overview:Formatting of this note might be different from the original.  
Added automatically from request for surgery [id number]  
   
   
                          History of colonic polyps 2018  
   
                                                      
  
  
Overview:Formatting of this note might be different from the original.  
Added automatically from request for surgery [id number]  
   
   
                          Gastroesophageal reflux disease 2018  
   
                                                      
  
  
Overview:Formatting of this note might be different from the original.  
Added automatically from request for surgery [id number]  
   
   
                          Acute pulmonary edema 10/05/2011                10/06/  
2011  
   
                                                      
  
  
Overview:Formatting of this note might be different from the original.  
10/5/2011  
cardiogenic and noncardiogenic factors  
  
   
   
                          Atelectasis  10/05/2011                10/06/2011  
   
                          Hypotension  10/04/2011                10/06/2011  
   
                                                      
  
  
Overview:Formatting of this note might be different from the original.  
10/4/2011  
goal map 65-80  
   
   
                          Stress hyperglycemia 10/04/2011                10/06/2  
011  
   
                                                      
  
  
Overview:Formatting of this note might be different from the original.  
10/4/2011  
Perioperative insulin resistance and exacerbation of hyperglycemia.  
Control blood glucose with titration of insulin infusion  
  
10/5/2011  
adequate control  
goal FBG<180  
  
   
   
                          Post-op pain 10/04/2011                10/06/2011  
   
                          Mechanically assisted ventilation 10/04/2011            
      10/05/2011  
   
                                                      
  
  
Overview:Formatting of this note might be different from the original.  
10/4/2011  
optimize MV settings, WTE  
current setting:FiO2, 75%, peep 8  
   
   
                          Cardiac insufficiency 10/04/2011                10/05/  
2011  
   
                                                      
  
  
Overview:Formatting of this note might be different from the original.  
10/4/2011  
RV mild to moderate post pump  
goal CI>2.3  
   
   
                          Hypoxemia    10/04/2011                10/06/2011  
   
                          discharge planning 2011                10/19/201  
1  
   
                                                      
  
  
Overview:Formatting of this note might be different from the original.  
age 71,  lives in [place]. No DC needs.  
/  
   
   
                          Pre-op testing 2011                10/04/2011  
   
                                                      
  
  
Overview:Formatting of this note is different from the original.  
Images from the original note were not included.  
[facility]  
PRE-OP CHECKLIST  
  
Surgeon: Deangelo Angulo M.D. Informed Consent Completed: No  
STS Score: 1.4%  
CAD: Yes - CAD on Problem List: Yes  
Is intended procedure a CABG: Yes - is a beta blocker ordered? Yes  
  
H & P completed: Yes  
  
PA/LAT: Completed CT: Completed MRI: N/A LE US: N/A  
  
Cath: Yes - reviewed: Yes Echo:Completed EKG: Completed EF %: 61  
  
PI's: N/A Carotid: Completed Mapping: N/A Dental: Completed PFT's: N/A  
  
Basename 11 0729  
WBC 7.18  
HB 13.1  
HCT 41.8  
  
INR 1.0  
CREAT 1.35  
  
UA: Normal  
HCG:N/A  
  
ABO/ABO Confirmed: Yes  
  
Blood ordered: No  
  
SA Swab: Yes - results: Pending  
  
Last Dose of Anticoagulation: vitamins  
  
Op Note: N/A  
  
Pacemaker Check: N/A  
  
Consults: GI 2001  
  
DM: No  
  
Cardiac Surgical prep: No  
  
SIGNATURE: Krystal Castellanos RN CHECKED BY:  
DATE of SERVICE: 2011  
TIME of SERVICE: 2:23 PM  
  
  
   
   
                          Anemia of chronic disease 2011  
   
                          Intestinal polyp 2011  
   
                                                      
  
  
Overview:Formatting of this note might be different from the original.  
Distal ileum ulcerated polyp.  
   
   
                                                    Nonspecific abnormal results  
 of liver   
function study      2006  
   
                          Impotence of organic origin 2006  
   
                                                    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  
9.9 (morbid   
obesity)                                                    2021  
  
documented as of this encounter (statuses as of 10/19/2023)  
[facility]03- History of Past illness Narrative*   
  
                          Problem      Noted Date   Diagnosed Date Resolved Date  
   
                          Stasis ulcer 2023  
   
                          Obesity, Class II, BMI 35-39.9 2021  
   
                          Medicare annual wellness visit, subsequent 2019  
   
                                                    Encounter for long-term (cur  
rent) use of   
medications         2018  
   
                                                      
  
  
Overview:Formatting of this note might be different from the original.  
Added automatically from request for surgery [id number]  
   
   
                          History of colonic polyps 2018  
   
                                                      
  
  
Overview:Formatting of this note might be different from the original.  
Added automatically from request for surgery [id number]  
   
   
                          Gastroesophageal reflux disease 2018  
   
                                                      
  
  
Overview:Formatting of this note might be different from the original.  
Added automatically from request for surgery [id number]  
   
   
                          Acute pulmonary edema 10/05/2011                10/06/  
2011  
   
                                                      
  
  
Overview:Formatting of this note might be different from the original.  
10/5/2011  
cardiogenic and noncardiogenic factors  
  
   
   
                          Atelectasis  10/05/2011                10/06/2011  
   
                          Hypotension  10/04/2011                10/06/2011  
   
                                                      
  
  
Overview:Formatting of this note might be different from the original.  
10/4/2011  
goal map 65-80  
   
   
                          Stress hyperglycemia 10/04/2011                10/06/2  
011  
   
                                                      
  
  
Overview:Formatting of this note might be different from the original.  
10/4/2011  
Perioperative insulin resistance and exacerbation of hyperglycemia.  
Control blood glucose with titration of insulin infusion  
  
10/5/2011  
adequate control  
goal FBG<180  
  
   
   
                          Post-op pain 10/04/2011                10/06/2011  
   
                          Mechanically assisted ventilation 10/04/2011            
      10/05/2011  
   
                                                      
  
  
Overview:Formatting of this note might be different from the original.  
10/4/2011  
optimize MV settings, WTE  
current setting:FiO2, 75%, peep 8  
   
   
                          Cardiac insufficiency 10/04/2011                10/05/  
2011  
   
                                                      
  
  
Overview:Formatting of this note might be different from the original.  
10/4/2011  
RV mild to moderate post pump  
goal CI>2.3  
   
   
                          Hypoxemia    10/04/2011                10/06/2011  
   
                          discharge planning 2011                10/19/201  
1  
   
                                                      
  
  
Overview:Formatting of this note might be different from the original.  
age 71,  lives in [place], [place]. No DC needs.  
/  
   
   
                          Pre-op testing 2011                10/04/2011  
   
                                                      
  
  
Overview:Formatting of this note is different from the original.  
Images from the original note were not included.  
[facility]  
PRE-OP CHECKLIST  
  
Surgeon: Deangelo Angulo M.D. Informed Consent Completed: No  
STS Score: 1.4%  
CAD: Yes - CAD on Problem List: Yes  
Is intended procedure a CABG: Yes - is a beta blocker ordered? Yes  
  
H & P completed: Yes  
  
PA/LAT: Completed CT: Completed MRI: N/A LE US: N/A  
  
Cath: Yes - reviewed: Yes Echo:Completed EKG: Completed EF %: 61  
  
PI's: N/A Carotid: Completed Mapping: N/A Dental: Completed PFT's: N/A  
  
Basename 11 0729  
WBC 7.18  
HB 13.1  
HCT 41.8  
  
INR 1.0  
CREAT 1.35  
  
UA: Normal  
HCG:N/A  
  
ABO/ABO Confirmed: Yes  
  
Blood ordered: No  
  
SA Swab: Yes - results: Pending  
  
Last Dose of Anticoagulation: vitamins  
  
Op Note: N/A  
  
Pacemaker Check: N/A  
  
Consults: GI 2001  
  
DM: No  
  
Cardiac Surgical prep: No  
  
SIGNATURE: Krystal Castellanos RN CHECKED BY:  
DATE of SERVICE: 2011  
TIME of SERVICE: 2:23 PM  
  
  
   
   
                          Anemia of chronic disease 2011  
   
                          Intestinal polyp 2011  
   
                                                      
  
  
Overview:Formatting of this note might be different from the original.  
Distal ileum ulcerated polyp.  
   
   
                                                    Nonspecific abnormal results  
 of liver   
function study      2006  
   
                          Impotence of organic origin 2006  
   
                                                    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  
9.9 (morbid   
obesity)                                                    2021  
  
documented as of this encounter (statuses as of 2023)  
[facility]03- History of Past illness Narrative*   
  
                          Problem      Noted Date   Diagnosed Date Resolved Date  
   
                          Stasis ulcer 2023  
   
                          Obesity, Class II, BMI 35-39.9 2021  
   
                          Medicare annual wellness visit, subsequent 2019  
   
                                                    Encounter for long-term (cur  
rent) use of   
medications         2018  
   
                                                      
  
  
Overview:Formatting of this note might be different from the original.  
Added automatically from request for surgery [id number]  
   
   
                          History of colonic polyps 2018  
   
                                                      
  
  
Overview:Formatting of this note might be different from the original.  
Added automatically from request for surgery [id number]  
   
   
                          Gastroesophageal reflux disease 2018  
   
                                                      
  
  
Overview:Formatting of this note might be different from the original.  
Added automatically from request for surgery [id number]  
   
   
                          Acute pulmonary edema 10/05/2011                10/06/  
2011  
   
                                                      
  
  
Overview:Formatting of this note might be different from the original.  
10/5/2011  
cardiogenic and noncardiogenic factors  
  
   
   
                          Atelectasis  10/05/2011                10/06/2011  
   
                          Hypotension  10/04/2011                10/06/2011  
   
                                                      
  
  
Overview:Formatting of this note might be different from the original.  
10/4/2011  
goal map 65-80  
   
   
                          Stress hyperglycemia 10/04/2011                10/06/2  
011  
   
                                                      
  
  
Overview:Formatting of this note might be different from the original.  
10/4/2011  
Perioperative insulin resistance and exacerbation of hyperglycemia.  
Control blood glucose with titration of insulin infusion  
  
10/5/2011  
adequate control  
goal FBG<180  
  
   
   
                          Post-op pain 10/04/2011                10/06/2011  
   
                          Mechanically assisted ventilation 10/04/2011            
      10/05/2011  
   
                                                      
  
  
Overview:Formatting of this note might be different from the original.  
10/4/2011  
optimize MV settings, WTE  
current setting:FiO2, 75%, peep 8  
   
   
                          Cardiac insufficiency 10/04/2011                10/05/  
2011  
   
                                                      
  
  
Overview:Formatting of this note might be different from the original.  
10/4/2011  
RV mild to moderate post pump  
goal CI>2.3  
   
   
                          Hypoxemia    10/04/2011                10/06/2011  
   
                          discharge planning 2011                10/19/201  
1  
   
                                                      
  
  
Overview:Formatting of this note might be different from the original.  
age 71,  lives in [place], [place]. No DC needs.  
/  
   
   
                          Pre-op testing 2011                10/04/2011  
   
                                                      
  
  
Overview:Formatting of this note is different from the original.  
Images from the original note were not included.  
[facility]  
PRE-OP CHECKLIST  
  
Surgeon: Deangelo Angulo M.D. Informed Consent Completed: No  
STS Score: 1.4%  
CAD: Yes - CAD on Problem List: Yes  
Is intended procedure a CABG: Yes - is a beta blocker ordered? Yes  
  
H & P completed: Yes  
  
PA/LAT: Completed CT: Completed MRI: N/A LE US: N/A  
  
Cath: Yes - reviewed: Yes Echo:Completed EKG: Completed EF %: 61  
  
PI's: N/A Carotid: Completed Mapping: N/A Dental: Completed PFT's: N/A  
  
Basename 11 0729  
WBC 7.18  
HB 13.1  
HCT 41.8  
  
INR 1.0  
CREAT 1.35  
  
UA: Normal  
HCG:N/A  
  
ABO/ABO Confirmed: Yes  
  
Blood ordered: No  
  
SA Swab: Yes - results: Pending  
  
Last Dose of Anticoagulation: vitamins  
  
Op Note: N/A  
  
Pacemaker Check: N/A  
  
Consults: GI 2001  
  
DM: No  
  
Cardiac Surgical prep: No  
  
SIGNATURE: Krystal Castellanos RN CHECKED BY:  
DATE of SERVICE: 2011  
TIME of SERVICE: 2:23 PM  
  
  
   
   
                          Anemia of chronic disease 2011  
   
                          Intestinal polyp 2011  
   
                                                      
  
  
Overview:Formatting of this note might be different from the original.  
Distal ileum ulcerated polyp.  
   
   
                                                    Nonspecific abnormal results  
 of liver   
function study      2006  
   
                          Impotence of organic origin 2006  
   
                                                    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  
9.9 (morbid   
obesity)                                                    2021  
  
documented as of this encounter (statuses as of 2023)  
[facility]ructions
  * Patient Instructions*   
  
Júnior Ahuja MD - 2023 7:33 PM EDT  
  
Formatting of this note might be different from the original.  
STOP FUROSEMIDE WHILE ON TORSEMIDE X 5 DAYS.  
Electronically signed by Júnior Ahuja MD at 2023 7:33 PM EDT  
  
  
  
documented in this encounterFire[facility]- History of Present 
illness Narrative* Júnior Ahuja MD - 2023 7:16 PM EDTFormatting of 
  this note is different from the original.  
This note was created using NoteWriter.  
Subjective  
Patient presents with:  
[facility]  
  
Mitesh Braun is a 83 year old male here for EC follow up. He was seen 3/10 
for venous stasis ulceration of the right calf and cellulitis. He was treated 
with doxycycline with mild improvement. Hewas advised [facility], and he has 
an appointment tomorrow for initial evaluation, possibly with Dr. Jaime Madrigal. 
He also had a rash on his left leg that was not only itchy but burning.  
  
He had blisters of his left medial hand near the wrist from handling hot jelly.  
  
Review of Systems  
Constitutional: Negative for chills and fever.  
Respiratory: Negative for shortness of breath.  
Cardiovascular: Positive for leg swelling.  
Skin: Positive for rash and wound.  
  
ACTIVE PROBLEM LIST  
Essential hypertension  
Caren On Cpap  
Esophageal Reflux  
Gout  
Mixed hyperlipidemia  
Impaired Fasting Glucose  
Aortic Valve Disorder  
Polycystic Kidney  
Adenomatous Colon Polyp  
Thrombocytopenia (HCC)  
Cad (Coronary Artery Disease), Native Coronary Artery  
Adjustment Disorder With Depressed Mood  
Asthma Exacerbation  
Peripheral Polyneuropathy  
Splenomegaly  
Ckd (Chronic Kidney Disease) Stage 3, Gfr 30-59 Ml/Min ([facility])  
Abnormality of Gait  
Anemia of Chronic Disease  
Urge Incontinence of Urine  
Obesity, Class II, Bmi 35-39.9  
Frequent Falls  
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  
Iron Malabsorption  
Liver Lesion  
  
Current Outpatient Medications  
Medication Sig  
oxybutynin ER (DITROPAN XL) 15 mg 24 hr Extended Rel Tab Take 2 tablets by mouth
 once daily.  
furosemide (LASIX) 20 mg tablet TAKE 1 TABLET BY MOUTH EVERY OTHER RDAY  
Diaper,Brief, Adult,Disposable (DEPEND EASY FIT UNDERGARMENTS) 1 Each three 
times daily.  
ascorbic acid, vitamin C, (VITAMIN C) 500 mg tablet Take 1 tablet by mouth once 
daily.  
ascorbic acid-elderberry fruit 100-50 mg chew Take 2 tablets by mouth once 
daily.  
cyanocobalamin (VITAMIN B-12) 2,500 mcg tablet Take 2,500 mcg by mouth once 
daily.  
melatonin 10 mg tab Take 1 tablet by mouth as needed.  
aspirin, enteric coated (ASPIRIN, ENTERIC COATED) 81 mg EC tablet Take 81 mg by 
mouth once daily.  
albuterol HFA (PROVENTIL HFA, VENTOLIN HFA) 90 mcg/actuation inhaler Inhale 2 
Puffs as instructed every 4 hours as needed for wheezing/shortness of breath.  
omeprazole (PRILOSEC) 40 mg capsule Take 1 capsule by mouth once daily.  
allopurinol (ZYLOPRIM) 300 mg tablet Take 1 tablet by mouth once daily.  
magnesium oxide 400 mg magnesium cap Take 1 capsule by mouth once daily.  
fenofibrate nanocrystallized (TRICOR) 48 mg tablet Take 1 tablet by mouth once 
daily.  
sertraline (ZOLOFT) 50 mg tablet Take 1 tablet by mouth once daily.  
nitroglycerin sublingual (NITROQUICK) 0.4 mg SL tablet Dissolve 1 tablet under 
the tongue every 5 minutes as needed for chest pain. FOR CHEST PAIN. IF NO 
RELIEF CALL 911  
fluticasone (FLONASE) 50 mcg/actuation nasal spray Use 1 Spray in each nostril 
once daily.  
ferrous sulfate 325 mg (65 mg iron) EC tablet Take 1 tablet by mouth twice daily
 with meals. (Patient taking differently: Take 325 mg by mouth once daily.)  
amoxicillin (POLYMOX, AMOXIL) 500 mg capsule Take four tablets, one hour prior 
to dental work (Patient taking differently: Take four tablets by mouth , one 
hour prior to dental work)  
fluticasone-vilanterol (BREO ELLIPTA) 200-25 mcg/dose inhaler Inhale 1 
Inhalation as instructed once daily. Inhale one puff once daily. DO NOT CLICK 
OPEN UNTIL READY FOR DOSE  
multivitamin tablet Take 1 tablet by mouth once daily.  
torsemide (DEMADEX) 20 mg tablet Take 1 tablet by mouth once daily for 5 days.  
  
No current facility-administered medications for this visit.  
  
  
Objective  
/72 (BP Site: Left Arm, BP Position: Sitting, BP Cuff Size: Large Adult)  
 Pulse 72   Wt 109.3 kg (241 lb)   SpO2 100%   BMI 37.75 kg/m  
Physical Exam  
Constitutional:  
General: He is not in acute distress.  
Appearance: He is not ill-appearing.  
Cardiovascular:  
Rate and Rhythm: Normal rate and regular rhythm.  
Comments: Lymphedema. Right leg with scattered excoriated superficial small 
ulcers, some with surrounding erythema, most are non weeping, some with serous 
drainage. No lymphangitic streaking, no tenderness. Left leg with few ulcers. 
Left medial knee with palm sized cluster of zosteriform post inflammatory 
pigmented maculopapules.  
Pulmonary:  
Breath sounds: Normal breath sounds.  
Musculoskeletal:  
Right lower leg: 3+ Edema present.  
Left lower le+ Edema present.  
Skin:  
Comments: 2 small, vesicles of the medial left hand from burn. No s/s infection.  
Neurological:  
Mental Status: He is alert.  
  
Assessment and Plan  
  
1. Venous stasis ulcer of other part of right lower leg limited to breakdown of 
skin without varicose veins (HCC) - ICD9: 459.81, 707.15, ICD10: I87.2, L97.811 
(primary diagnosis)  
- Keep [facility] appointment.  
- TORSEMIDE 20 MG TABLET. 40 mg daily x 5 days. Hold furosemide while on this.  
- CEPHALEXIN 500 MG CAPSULE. 4 times per day x 7 days.  
- We can consider extending torsemide, but will need to monitor his CKD. Call 
back if needed.  
  
2. Rash - ICD9: 782.1, ICD10: R21  
Probably zoster, beyond window for antiviral.  
  
3. Partial thickness burn of left hand, unspecified site of hand, initial 
encounter - ICD9: 944.20,ICD10: T23.A  
- Do not puncture. If it drains, keep clean and apply antibiotic ointment.  
  
Júnior Ahuja MD  
  
Electronically signed by Júnior Ahuja MD at 2023 8:39 AM EDT  
  
documented in this encounterFirelands [facility]03- Miscellaneous Notes* 
  Telephone Encounter - Son Norton Ma - 2023 3:17 PM EDTFormatting of this
   note might be different from the original.  
Faxed.  
Electronically signed by Son Norton Ma at 2023 3:17 PM EDT  
  
* Telephone Encounter - Starr Marquez LPN - 2023 10:55 AM EDTFormatting
   of this note might be different from the original.  
Cece from [facility] calling patient had called to schedule appt, they 
have no referral. [facility]. Fax number is [phone], pending order needs diagnosis.  
Please advise  
Electronically signed by Starr Marquez LPN at 2023 11:00 AM EDT  
  
documented in this encounterFirelands [facility]03- [facility]03- History of Present illness Narrative* Marin Rankin MD -
   03/10/2023 2:27 PM ESTFormatting of this note is different from the original.  
Patient presents with:  
sores on lower legs: X 2 weeks  
  
HPI:  
Skin Lesion:  
Location: right lower leg  
Duration: 2 weeks  
Pruritis/Pain: was pruritic, neosporin takes the hurt away  
Change: starting to seep watery liquid  
Drainage/blister/pustule/ulceration: open sores, red.  
Treatment: neosporin  
  
Denies chest pain, shortness of breath, dizziness, palpitations, fever, chills.  
Medication compliance: no changes recently. Sees heart and kidney doctors, no 
recent changes.  
  
MEDICATIONS:  
oxybutynin ER (DITROPAN XL) 15 mg 24 hr Extended Rel Tab Take 2 tablets by mouth
 once daily.  
furosemide (LASIX) 20 mg tablet TAKE 1 TABLET BY MOUTH EVERY OTHER RDAY  
Diaper,Brief, Adult,Disposable (DEPEND EASY FIT UNDERGARMENTS) 1 Each three 
times daily.  
ascorbic acid, vitamin C, (VITAMIN C) 500 mg tablet Take 1 tablet by mouth once 
daily.  
ascorbic acid-elderberry fruit 100-50 mg chew Take 2 tablets by mouth once 
daily.  
cyanocobalamin (VITAMIN B-12) 2,500 mcg tablet Take 2,500 mcg by mouth once 
daily.  
melatonin 10 mg tab Take 1 tablet by mouth as needed.  
aspirin, enteric coated (ASPIRIN, ENTERIC COATED) 81 mg EC tablet Take 81 mg by 
mouth once daily.  
albuterol HFA (PROVENTIL HFA, VENTOLIN HFA) 90 mcg/actuation inhaler Inhale 2 
Puffs as instructed every 4 hours as needed for wheezing/shortness of breath.  
omeprazole (PRILOSEC) 40 mg capsule Take 1 capsule by mouth once daily.  
allopurinol (ZYLOPRIM) 300 mg tablet Take 1 tablet by mouth once daily.  
magnesium oxide 400 mg magnesium cap Take 1 capsule by mouth once daily.  
fenofibrate nanocrystallized (TRICOR) 48 mg tablet Take 1 tablet by mouth once 
daily.  
sertraline (ZOLOFT) 50 mg tablet Take 1 tablet by mouth once daily.  
nitroglycerin sublingual (NITROQUICK) 0.4 mg SL tablet Dissolve 1 tablet under 
the tongue every 5 minutes as needed for chest pain. FOR CHEST PAIN. IF NO 
RELIEF CALL 911  
fluticasone (FLONASE) 50 mcg/actuation nasal spray Use 1 Spray in each nostril 
once daily.  
ferrous sulfate 325 mg (65 mg iron) EC tablet Take 1 tablet by mouth twice daily
 with meals. (Patient taking differently: Take 325 mg by mouth once daily.)  
amoxicillin (POLYMOX, AMOXIL) 500 mg capsule Take four tablets, one hour prior 
to dental work (Patient taking differently: Take four tablets by mouth , one 
hour prior to dental work)  
fluticasone-vilanterol (BREO ELLIPTA) 200-25 mcg/dose inhaler Inhale 1 
Inhalation as instructed once daily. Inhale one puff once daily. DO NOT CLICK 
OPEN UNTIL READY FOR DOSE  
multivitamin tablet Take 1 tablet by mouth once daily.  
torsemide (DEMADEX) 20 mg tablet Take 1 tablet by mouth once daily for 5 days.  
  
ALLERGIES:  
ALLERGIES  
Allergen Reactions  
Bees Anaphylaxis  
Acetaminophen Other: See Comments  
Hydrocodone GI Upset  
Lipitor [Atorvastat* Itching  
Singulair [[place]* Itching  
Strawberries Hives  
hives  
  
VITALS:  
/82   Pulse 92   Temp 36.7 C (98 F) (Tympanic)   Resp 18   Wt 108.2 kg 
(238 lb 9.6 oz)   [id number]%   BMI 37.37 kg/m  
  
Last 6 Encounter Wt Readings:  
Date: Wt:  
03/10/2023 108.2 kg (238 lb 9.6 oz)  
2023 108.2 kg (238 lb 8 oz)  
2023 111.6 kg (246 lb)  
2023 114.3 kg (252 lb)  
2023 114.3 kg (252 lb)  
2022 110.9 kg (244 lb 8 oz)  
  
PE:  
Pleasant, in no acute distress. Sitting in wheeled chair. Accompanied by his 
daughter.  
EXT: bilateral lymphedema. 2cm dry red based ulcer left upper shin; few 
millimeters of erythematoushalo.  
Numerous excoriation ulcers with red base anterior right lower leg from mid shin
 to ankle. Ulcers ranging in size from 1/2 to 2 cm. Surrounding rythema 
increases to confluent at and above the ankle. There is a 3 cm deeper ulceration
 with some white granulation medial lower calf.  
  
ASSESSMENT/PLAN:  
1. Venous stasis ulcer of right calf limited to breakdown of skin without 
varicose veins (HCC) - ICD9: 459.81, 707.12, ICD10: I87.2, L97.211 (primary 
diagnosis)  
2. Cellulitis of right lower leg - ICD9: 682.6, ICD10: L03.115  
Inflammation from open sores, possible early cellulitis.  
- DOXYCYCLINE MONOHYDRATE 100 MG CAPSULE  
Change ointment to bacitracin which has lower allergy incidence.  
Elevate legs when able.  
Wound Center recommended. He will call to schedule with [facility].  
  
Marin Rankin MD  
  
  
Electronically signed by Marin Rankin MD at 03/10/2023 2:55 PM EST  
  
documented in this encounterFirelands Regional Medical Center [facility]02- Miscellaneous Notes* 
  Telephone Encounter - KELSEA Rahman RN - 2023 12:57 PM ESTFormatting of
   this note is different from the original.  
Patient has been identified by name and date of birth: Yes, Provider Dr. Ahuja Date 23 Time 12:58 pm  
Patient phones for refill(s):  
Requested Prescriptions  
  
Pending Prescriptions Disp Refills  
oxybutynin ER (DITROPAN XL) 15 mg 24 hr Extended Rel Tab 180 tablet 3  
Sig: Take 2 tablets by mouth once daily.  
  
Date of last office visit with pcp: 23. Next appt: 23  
Last 2 Encounter Wt Readings:  
Date: Wt:  
2023 108.2 kg (238 lb 8 oz)  
2023 111.6 kg (246 lb)  
Previous labs/tests for medication: Blood Pressure:  
BUN (mg/dL)  
Date Value  
2023 32  
2022 28  
  
Sodium (mmol/L)  
Date Value  
2023 137  
2022 139  
Last 1 Encounter BP Readings:  
Date: BP:  
2023 122/72  
Liver Function:  
ALT (U/L)  
Date Value  
2023 29  
2021 18  
2021 19  
  
AST (U/L)  
Date Value  
2023 41  
2021 30  
2021 31  
  
Please advise. Thank you. KELSEA Rahman RN  
  
Electronically signed by KELSEA Rahman RN at 2023 12:59 PM EST  
  
documented in this encounterFirelands Regional Medical Center [facility]02- NoteC[facility]02- History of Present illness Narrative* Tello Ortega, DO - 
  2023 9:33 AM ESTFormatting of this note is different from the original.  
Hematologic problem(s):  
1) IgG kappa monoclonal gammopathy  
2) BREEZY.  
3) Liver mass.  
4) Hypercalcemia.  
5) Splenomegaly.  
6) Cirrhosis.  
  
HPI: The patient is an 83 yo male with a PMH significant for HTN, polycystic 
kidney disease (seeingnephrology for ~15 years), HLD, aortic repair (bovine 
valve), CAD, CAREN (CPAP), asthma, hypercalcemia, splenomegaly (noted on CT chest 
2014; spleen up to 18.4 cm in AP dimension; not enlarged on CT enterography 
) and obesity.  
  
He had lab work ordered at his nephrologist office. Protein electrophoresis of 
the urine demonstrated no evidence of monoclonal spike. Urine protein creatinine
 ratio was elevated. Chemistry panel showed a creatinine of 1.33 mg/dL. Calcium 
mildly elevated 10.6 mg/dL. Vitamin D39.7 PTH 93.4 CBC significant for a total 
white count of 4900. No differential performed. Hemoglobin 9.0 g/dL. . 
Platelet count 79,000. Protein electrophoresis of the serum revealed a M spike 
but no immunofixation was performed.  
  
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 Worse over time.
 He's never been given an answer as to why.  
  
Balance is off and he uses wheeled walker.  
  
[place] alone [place]. Has home health aides for cleaning and washing 
cl[facility]. Daughter lives close by and he gets meals on wheels.  
  
Was having left posterior hip pain and got relief with Absorsene Jr.  
  
Presents for ongoing hematologic management.  
  
Interim history:  
He offers no complaints today.  
  
He received 10 doses of iron sucrose.  
Feels much better.  
  
Sensory neuropathy symptoms stable.  
Seen by neurology group in [place].  
Was told nothing to be done for neuropathy.  
Uses wheeled walker.  
  
Better appetite. Gets meals on wheels.  
No abdominal pain or bloating.  
  
Underwent bone marrow biopsy at [facility].  
  
ROS:  
Constitutional: Denies episodes of fever and night sweats.  
Neuro: See above. He has mild left hemiparesis.  
HEENT: No recent change in voice, vision or hearing.  
Resp: Denies cough, wheeze and hemoptysis. Denies shortness of breath at rest.  
CVS: Denies exertional chest pain, PND, orthopnea. Chronic bilateral lower 
extremity swelling.  
GI: Bowels typically move twice a day. Formed stools. Black from iron. No blood 
observed..  
: Denies dysuria or gross hematuria.  
Endo: Denies hot flashes. Denies polyuria and polydipsia. Denies heat and cold 
intolerance.  
Musculoskeletal: No musculoskeletal pain other than left hip pain as outlined 
above.  
Derm: Denies rash. Denies jaundice and diffuse pruritis.  
Heme: Denies unusual bleeding and unexplained bruising.  
Psych: Normal mood.  
  
PHYSICAL EXAM:  
Vitals: Blood pressure 122/72, pulse 66, temperature 36.3 C (97.3 F), weight 
108.2 kg (238 lb 8 oz), SpO2 99 %.  
Well-appearing and in no acute distress.  
EYES: Sclerae are anicteric bilaterally.  
LYMPHATIC: There is no palpable cervical or supraclavicular adenopathy.  
RESPIRATORY: Inspiratory breath sounds are of normal intensity in all fields. No
 rales, wheezes or rhonchi.  
CARDIOVASCULAR: Rhythm is regular.  
ABDOMEN: The abdomen is nondistended.  
Extremities: Bilateral lower extremity swelling. Right somewhat worse than left.
 Overlying pitting.Stable.  
SKIN: No jaundice.  
NEUROLOGIC: CNs II-XII are grossly intact.  
  
LABS:  
Component Latest Ref Rng & Units 2022  
WBC 3.70 - 11.00 k/uL 5.22 4.65 3.87  
RBC 4.20 - 6.00 m/uL 3.27 (L) 3.73 (L) 3.63 (L)  
Hemoglobin 13.0 - 17.0 g/dL 10.9 (L) 11.1 (L) 11.8 (L)  
Hematocrit 39.0 - 51.0 % 34.6 (L) 36.0 (L) 36.2 (L)  
MCV 80.0 - 100.0 fL 105.8 (H) 96.5 99.7  
MCH 26.0 - 34.0 pg 33.3 29.8 32.5  
MCHC 30.5 - 36.0 g/dL 31.5 30.8 32.6  
RDW-CV 11.5 - 15.0 % 15.0 16.9 (H) 20.0 (H)  
Platelet Count 150 - 400 k/uL 69 (L) 98 (L) 66 (L)  
MPV 9.0 - 12.7 fL 12.2 11.4 9.8  
Neut% % 78.9 79.1  
Abs Neut (ANC) 1.45 - 7.50 k/uL 3.67 3.06  
Lymph% % 10.8 11.1  
Abs Lymph 1.00 - 4.00 k/uL 0.50 (L) 0.43 (L)  
Mono% % 5.6 6.2  
Abs Mono <0.87 k/uL 0.26 0.24  
Eosin% % 3.4 2.8  
Abs Eosin <0.46 k/uL 0.16 0.11  
Baso% % 0.9 0.5  
Abs Baso <0.11 k/uL 0.04 <0.03  
Immature Gran % % 0.4 0.3  
IMMATURE GRANS (ABS) <0.10 k/uL <0.03 <0.03  
NRBC /100 WBC 0.0 0.0  
Absolute nRBC <0.01 k/uL <0.01 <0.01 <0.01  
DTYPE Auto Auto  
Iron 41 - 186 ug/dL 30 (L) 46  
TIBC 232 - 386 ug/dL 370 360  
Transferrin Saturation 15.0 - 57.0 % 8.1 (L) 12.8 (L)  
Ferritin 30.3 - 565.7 ng/mL 24.8 (L) 50.4  
  
Component Latest Ref Rng & Units 2021  
Protein, Total 6.3 - 8.0 g/dL 6.4 5.8 (L) 6.6  
Albumin 3.9 - 4.9 g/dL 3.8 (L) 3.5 (L) 3.8 (L)  
Calcium 8.5 - 10.2 mg/dL 10.6 (H) 10.8 (H) 10.8 (H)  
Bilirubin, Total 0.2 - 1.3 mg/dL 0.5 0.4 0.5  
Alkaline Phosphatase 38 - 113 U/L 62 85 111  
AST 14 - 40 U/L 31 30 35  
Glucose 74 - 99 mg/dL 103 (H) 108 (H) 98  
BUN 9 - 24 mg/dL 26 (H) 33 (H) 27 (H)  
Creatinine 0.73 - 1.22 mg/dL 1.16 1.31 (H) 1.01  
Sodium 136 - 144 mmol/L 141 140 135 (L)  
Potassium 3.7 - 5.1 mmol/L 4.8 5.5 (H) 4.0  
Chloride 97 - 105 mmol/L 109 (H) 108 (H) 103  
CO2 22 - 30 mmol/L 23 25 25  
Anion Gap 9 - 18 mmol/L 9 7 (L) 7 (L)  
ALT 10 - 54 U/L 19 21 19  
eGFR-African American >60  
eGFR-All Other Races . >60  
eGFR >=60 mL/min/1.73m 54 (L) 74  
  
Component Latest Ref Rng & Units 2022  
Cold Agglut, 4 degrees C <1:32 Dilutions <1:32  
Cold Agglut, 37 degrees C Dilutions Test not indicated when titer is <1:32 at 4 
degrees C.  
  
Component Latest Ref Rng & Units 2022  
PT Sec <13.1 sec 10.9  
PT INR 0.9 - 1.3 1.1  
Pathologist Interpretation, CBCDIF Normocytic anemia with slight polychromasia .
 . .  
Pathologist ([facility]) Reviewed by Savanah Cole M.D., Ph.D  
APTT 23.0 - 32.4 sec 26.2  
Fibrinogen Ag 149 - 353 mg/dL 353  
  
Component Latest Ref Rng & Units 2022  
IgG 700 - 1,600 mg/dL 1,120  
IgA 70 - 400 mg/dL 346  
IgM 40 - 230 mg/dL 79  
  
Component Latest Ref Rng & Units 2022  
Albumin 3.43 - 5.41 g/dL 3.62  
Alpha 1 Globulin 0.18 - 0.43 g/dL 0.32  
Alpha 2 Globulin 0.42 - 0.98 g/dL 0.66  
Beta Globulin 0.61 - 1.17 g/dL 0.87  
Gamma Globulin 0.53 - 1.51 g/dL 1.12  
Interpretation (Prot Electro) No definitive M protein is identified on protein 
electrophoresis. An M protein is identified on protein electrophoresis. (A)  
Interpretation Comment for Protein Electrophoresis See separate immunofixation 
report for characterization of monoclonal gammopathy. . . .  
M-Protein Location Gamma Fraction 1  
M-Protein Concentration <=0.00 g/dL 0.30 (H)  
SPE Staff Review Reviewed by Sanaz Parrish MD  
  
Component Latest Ref Rng & Units 2022  
Kappa Free, Serum 3.3 - 19.4 mg/L 65.0 (H)  
Lambda Free, Serum 5.7 - 26.3 mg/L 37.1 (H)  
K/L Ratio, Serum 0.26 - 1.65 1.75 (H)  
  
IgG kappa on immunofixation of serum.  
  
Component Latest Ref Rng & Units 2023  
PTH, Intact 15 - 65 pg/mL 69 (H)  
  
PATHOLOGY:  
Bone marrow biopsy done at [facility] 2023:  
Hypercellular bone marrow with trilineage hematopoiesis.  
No evidence of lymphoproliferative disorder or plasma cell dyscrasia.  
  
Flow cytometry did not reveal evidence of lymphoproliferative disorder. There 
was no monoclonal plasma cell neoplasm.  
  
Plasma cells reported at 2% on aspirate.  
Iron was reported as rare stainable iron.  
  
Fluorescence in situ hybridization for BCR ABL was negative.  
  
46 XY [20].  
  
IMAGING:  
US 2022:  
IMPRESSION:  
1. There is a new hyperechoic nodule in the right lobe of the liver  
which could be further evaluated by MRI  
2. Nodular contour of the liver which may be due to cirrhosis  
3. Splenomegaly  
  
Bone survey 2022:  
IMPRESSION:  
SEVERAL SUBCENTIMETER LUCENT AREAS IN THE CALVARIUM.  
  
DIFFUSE OSTEOPENIA.  
  
DEGENERATIVE CHANGES AS DESCRIBED. POSTOPERATIVE CHANGES AS DESCRIBED.  
  
NO ADDITIONAL FOCAL LYTIC OR BLASTIC ABNORMALITY IS APPRECIATED.  
  
ASSESSMENT/PLAN:  
(D47.2) MGUS (monoclonal gammopathy of unknown significance) (primary encounter 
diagnosis)  
(D47.2) IgG monoclonal gammopathy  
Assessment:  
-The patient is an 82-year-old male with a past medical history as outlined 
above. He has no history of diabetes but has longstanding worsening sensory 
neuropathy of the feet and left hand. He has polycystic kidney disease as well 
as chronic mild hypercalcemia with elevation of PTH and stable serumcreatinine 
over time. Referred for possible serum monoclonal antibody on electrophoresis of
 the serum. Urine negative for monoclonal protein on electrophoresis.  
-Low-level IgG kappa monoclonal gammopathy.  
-Previous hypercalcemia secondary to hyperparathyroidism.  
-Bone survey revealed no lytic lesions.  
-Previous hypercalcemia from hyperparathyroidism.  
-I reviewed the results of the bone marrow biopsy in detail with the patient and
 his daughter. No evidence of smoldering myeloma or multiple myeloma.  
-I discussed the natural history, treated course, and prognosis of MGUS with the
 patient and his daughter.  
Plan:  
-Reassess in 1 year pending results of today's lab work.  
  
(D50.0) Iron deficiency anemia due to chronic blood loss  
(D69.6) Thrombocytopenia (HCC)  
Assessment:  
-He also has macrocytic anemia and thrombocytopenia. Prior reports of 
splenomegaly. I previously personally reviewed CT images from  and . In 
2014, spleen measured up to about 16 cm in craniocaudal dimension. No mention of
 fatty liver or other morphologic changes of the liver to suggest cirrhosis.  
-Ultrasound suggestive of cirrhosis with splenomegaly.  
-Likely some splenic sequestration of platelets.  
-Ferritin was low indicating iron deficiency. Feels much better after parenteral
 iron.  
-He is going to be scheduled with GI for colonoscopy at [facility]. Could not 
be done here.  
Plan:  
-Follow-up with colonoscopy.  
-Repeat CBC and iron studies in about 3 months.  
  
(E21.3) Hyperparathyroidism (HCC)  
Assessment:  
-Serum PTH level elevated.  
Plan:  
-Defer management to PCP.  
  
(K74.69) Other cirrhosis of liver (HCC)  
Assessment:  
-Established with hepatology.  
-Was told needs closed MRI. Open would not give adequate pictures to further 
investigate the potential mass.  
Plan:  
-Follow up with hepatology.  
  
Portions of this documentation were copied and pasted from previous office visit
 notes in order to provide a cohesive continuity of the history. The note has 
been reviewed and edited and updated as necessary.  
  
I spent a total of 40 minutes on the date of the service which included 
preparing to see the patient, face-to-face patient care, completing clinical 
documentation, obtaining and/or reviewing separately obtained history, 
performing a medically appropriate examination, counseling and educating the pat
ient/family/caregiver, communicating with other HCPs (not separately reported), 
independently interpreting results (not separately reported), and communicating 
results to the patient/family/caregiver.  
  
Tello Ortega DO  
Electronically signed by Tello Ortega DO at 2023 10:10 AM EST  
  
documented in this encounterFire[facility]- Miscellaneous Notes* 
  Telephone Encounter - Sarah Calle LPN - 2023 10:03 AM ESTFormatting of 
  this note might be different from the original.  
Faxed office note and message of referral request to Dr Donohue.  
  
Phone # [phone] fax# [phone]  
  
  
Electronically signed by Sarah Calle LPN at 2023 10:06 AM EST  
  
* Telephone Encounter - Nathalia Bernal PA-C - 2023 8:17 AM ESTFormatting of 
  this note might be different from the original.  
Please fax referral request including my recent office note to Dr. Donohue's 
office for EGD with possible banding of esophageal varices, and colonoscopy for 
anemia and history of colon polyps  
Electronically signed by Nathalia Bernal PA-C at 2023 8:18 AM EST  
  
documented in this encounterFirelands [facility]01- Miscellaneous Notes* 
  Telephone Encounter - Marilin Sanchez - 2023 2:48 PM ESTFormatting of this
   note might be different from the original.  
Pt's D2 canceled and added on as a new D10  
Electronically signed by Marilin Sanchez at 2023 2:50 PM EST  
  
* Telephone Encounter - Cherelle Casillas LPN - 2023 1:52 PM ESTFormatting of
   this note might be different from the original.  
Daughter is aware of Dr. Ortega's recommendations. She is afraid that the patient
 is too weak and she will not be able to get him out of the house and into a car
 for urgent care/PCP visit. I instructed her if that was the case to call a 
squad and have patient transported to the ER.  
  
PSS- D2 iron will need rescheduled.  
  
Cherelle Casillas LPN  
  
Electronically signed by Cherelle Casillas LPN at 2023 1:58 PM EST  
  
* Telephone Encounter - Glenna Oneill RN - 2023 1:30 PM ESTFormatting of 
  this note might be different from the original.  
Called daughter, no answer, left a message requesting a call back.  
  
Per Dr. Ortega verbal response, okay to cancel iron today so patient can be 
evaluated elsewhere.  
  
Glenna Oneill RN  
  
I canceled iron infusion for today.  
  
PSS- D2 iron will need rescheduled.  
  
Cherelle Casillas LPN  
  
  
Electronically signed by Cherelle Casillas LPN at 2023 1:57 PM EST  
  
* Telephone Encounter - Tello Ortega DO - 2023 1:17 PM ESTFormatting of 
  this note might be different from the original.  
I don't think this is related to the iron infusions. He may have some sort of 
acute viral infectionso I would encourage her to take him to urgent care or talk
 to PCPs office about an urgent visit.  
  
Tello Ortega DO  
Electronically signed by Tello Ortega DO at 2023 1:27 PM EST  
  
* Telephone Encounter - Glenna Oneill RN - 2023 9:57 AM ESTFormatting of 
  this note might be different from the original.  
Spoke to daughter. Daughter stated on Saturday, patient had c/o diarrhea and 
 extreme fatigue and didn't feel like doing anything.  Yesterday his energy was 
a little bit better, patient was able to get up and move around. Daughter stated
 patients appetite is decreased but she was able to get him toeat chili and 
potato salad yesterday. Yesterday around 3:00 pm, patient fell when he was 
opening his front door to let his dog out. Per daughter, patient has bad 
neuropathy in his feet; left foot is worse,  he drags  his left foot. Daughter 
denies patient hitting his head or dizziness or visual changes related to the 
fall. Today, patients temperature is 99 F, he has chills, and at 6:00 am, 
patient had nausea and 1 episode of  green  emesis. Daughter denies headaches or
 cold symptoms. Daughter denies known sick contacts. Daughter aware this nurse 
will provide Dr. Ortega with an update and will call back with further 
instructions. Daughter stated understanding.  
  
Glenna Oneill RN  
  
Electronically signed by Glenna Oneill RN at 2023 10:10 AM EST  
  
* Telephone Encounter - Awa Olivas - 2023 9:13 AM ESTFormatting of this
   note might be different from the original.  
Patient received iron infusion 23. His daughter calls in stating that the 
patient has been experiencing severe fatigue, nausea, and a slightly elevated 
temperature. Daughter is requesting to be contacted to discuss plan of care. He 
is scheduled to receive another iron treatment today.  
Electronically signed by Awa Olivas at 2023 9:15 AM EST  
  
documented in this encounterFirelands [facility]01- Miscellaneous Notes* 
  Telephone Encounter - Glenna Oneill RN - 2023 10:34 AM ESTFormatting of 
  this note might be different from the original.  
Addressed in separate phone encounter.  
  
Glenna Oneill RN  
  
Electronically signed by Glenna Oneill RN at 2023 10:34 AM EST  
  
* Telephone Encounter - Cherelle Casillas LPN - 2023 9:17 AM ESTFormatting of
   this note might be different from the original.  
See phone note from 2023.  
  
Cherelle Casillas LPN  
  
Electronically signed by Cherelle Casillas LPN at 2023 9:17 AM EST  
  
documented in this encounterFirelands [facility]01- NoteC[facility]innati [facility]01- Instructions* Patient Instructions*   
  
Júnior Ahuja MD - 2023 3:42 PM EST  
  
Formatting of this note might be different from the original.  
DO NOT TAKE FUROSEMIDE WHILE TAKING TORSEMIDE.  
  
START TORSEMIDE TOMORROW.  
  
START ANTIBIOTIC TONIGHT.  
Electronically signed by Júnior Ahuja MD at 2023 3:42 PM EST  
  
  
  
documented in this encounterFirelands Regional Medical [facility]01- History of Present 
illness Narrative* Júnior Ahuja MD - 2023 3:31 PM ESTFormatting of 
  this note is different from the original.  
This note was created using [employer].  
Subjective  
Mitesh Braun is a 83 year old male here with daughter. He developed an itchy
 rash of both legs one week ago and he was applying capsaicin and other creams 
with no improvement.  
  
His blood pressure continued to be low, so most medications were discontinued 
other than [id number] mg every other day. His urinary incontinence was 
progressing, and aside from urge incontinence, he had incontinence without 
awareness.  
  
Review of Systems  
Constitutional: Negative for chills and fever.  
Respiratory: Negative for shortness of breath.  
Cardiovascular: Positive for leg swelling. Negative for chest pain and 
palpitations.  
Genitourinary: Positive for urgency.  
Incontinence.  
  
ACTIVE PROBLEM LIST  
Essential hypertension  
Caren On Cpap  
Esophageal Reflux  
Gout  
Mixed hyperlipidemia  
Impaired Fasting Glucose  
Aortic Valve Disorder  
Polycystic Kidney  
Adenomatous Colon Polyp  
Thrombocytopenia (HCC)  
Cad (Coronary Artery Disease), Native Coronary Artery  
Adjustment Disorder With Depressed Mood  
Asthma Exacerbation  
Peripheral Polyneuropathy  
Splenomegaly  
Ckd (Chronic Kidney Disease) Stage 3, Gfr 30-59 Ml/Min (Hcc)  
Abnormality of Gait  
Anemia of Chronic Disease  
Urge Incontinence of Urine  
Obesity, Class II, Bmi 35-39.9  
Frequent Falls  
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  
Iron Malabsorption  
Liver Lesion  
  
Current Outpatient Medications  
Medication Sig  
furosemide (LASIX) 20 mg tablet TAKE 1 TABLET BY MOUTH EVERY OTHER RDAY  
ascorbic acid, vitamin C, (VITAMIN C) 500 mg tablet Take 1 tablet by mouth once 
daily.  
LORazepam (ATIVAN) 1 mg tablet Take 1 tablet by mouth 1 hour before MRI  
ascorbic acid-elderberry fruit 100-50 mg chew Take 2 tablets by mouth once 
daily.  
cyanocobalamin (VITAMIN B-12) 2,500 mcg tablet Take 2,500 mcg by mouth once 
daily.  
melatonin 10 mg tab Take 1 tablet by mouth as needed.  
aspirin, enteric coated (ASPIRIN, ENTERIC COATED) 81 mg EC tablet Take 81 mg by 
mouth once daily.  
albuterol HFA (PROVENTIL HFA, VENTOLIN HFA) 90 mcg/actuation inhaler Inhale 2 
Puffs as instructed every 4 hours as needed for wheezing/shortness of breath.  
omeprazole (PRILOSEC) 40 mg capsule Take 1 capsule by mouth once daily.  
allopurinol (ZYLOPRIM) 300 mg tablet Take 1 tablet by mouth once daily.  
magnesium oxide 400 mg magnesium cap Take 1 capsule by mouth once daily.  
fenofibrate nanocrystallized (TRICOR) 48 mg tablet Take 1 tablet by mouth once 
daily.  
sertraline (ZOLOFT) 50 mg tablet Take 1 tablet by mouth once daily.  
nitroglycerin sublingual (NITROQUICK) 0.4 mg SL tablet Dissolve 1 tablet under 
the tongue every 5 minutes as needed for chest pain. FOR CHEST PAIN. IF NO 
RELIEF CALL 911  
fluticasone (FLONASE) 50 mcg/actuation nasal spray Use 1 Spray in each nostril 
once daily.  
ferrous sulfate 325 mg (65 mg iron) EC tablet Take 1 tablet by mouth twice daily
 with meals. (Patient taking differently: Take 325 mg by mouth once daily.)  
oxybutynin ER (DITROPAN XL) 15 mg 24 hr Extended Rel Tab Take 2 tablets by mouth
 once daily.  
amoxicillin (POLYMOX, AMOXIL) 500 mg capsule Take four tablets, one hour prior 
to dental work (Patient taking differently: Take four tablets by mouth , one 
hour prior to dental work)  
fluticasone-vilanterol (BREO ELLIPTA) 200-25 mcg/dose inhaler Inhale 1 
Inhalation as instructed once daily. Inhale one puff once daily. DO NOT CLICK 
OPEN UNTIL READY FOR DOSE  
multivitamin tablet Take 1 tablet by mouth once daily.  
torsemide (DEMADEX) 20 mg tablet Take 1 tablet by mouth once daily for 5 days.  
doxycycline (VIBRA-TABS) 100 mg tablet Take 1 tablet by mouth twice daily for 7 
days.  
Diaper,Brief, Adult,Disposable (DEPEND EASY FIT UNDERGARMENTS) 1 Each three 
times daily.  
  
No current facility-administered medications for this visit.  
  
  
Objective  
/64 (BP Site: Left Arm, BP Position: Sitting, BP Cuff Size: Large Adult)  
 Pulse 76   Temp 36.2 C (97.2 F) (Temporal)   Resp 16   Wt 111.6 kg (246 lb)   
BMI 38.53 kg/m  
Physical Exam  
Constitutional:  
General: He is not in acute distress.  
Appearance: He is not ill-appearing.  
Cardiovascular:  
Rate and Rhythm: Normal rate and regular rhythm.  
Heart sounds: No murmur heard.  
Pulmonary:  
Effort: No respiratory distress.  
Breath sounds: No wheezing or rales.  
Musculoskeletal:  
General: No tenderness.  
Right lower leg: 3+ Edema present.  
Left lower le+ Edema present.  
Skin:  
Findings: Erythema and rash present.  
Comments: Linear excoriations with erythema on both lower legs, no seepage, no 
ulcers, no drainage,some warmth, right more than left.  
Neurological:  
Mental Status: He is alert.  
Gait: Gait abnormal.  
  
Assessment and Plan  
  
1. Stasis dermatitis of both legs - ICD9: 454.1, ICD10: I87.2 (primary 
diagnosis)  
- TORSEMIDE 20 MG TABLET  
  
2. Local skin infection - ICD9: 686.9, ICD10: L08.9  
- Begin treatment with  
- DOXYCYCLINE HYCLATE 100 MG TABLET  
  
3. Urge incontinence of urine - ICD9: 788.31, ICD10: N39.41  
- DEPEND EASY FIT UNDERGARMENTS MISC  
  
4. Peripheral polyneuropathy - ICD9: 356.9, ICD10: G62.9  
- DEPEND EASY FIT UNDERGARMENTS MISC  
  
Further recommendations will depend on response by next week.  
  
Júnior Ahuja MD  
  
Electronically signed by Júnior Ahuja MD at 2023 4:59 PM EST  
  
documented in this encounterFirelands Regional Medical Center [facility]01- NoteC[facility]innati [facility]01- History of Present illness Narrative* Nathalia Bernal PA-C - 
  2023 1:38 PM ESTFormatting of this note is different from the original.  
HISTORY AND PHYSICAL  
  
Mitesh ARAUZ Kade  
1940  
  
REFERRING PHYSICIAN: Tello Ortega DO  
  
CHIEF COMPLAINT: Consult (colonoscopy)  
  
HPI: The patient is a 83 year old male referred for endoscopy. Mitesh notes 
iron deficiency anemiaand was referred by hem/onc for endoscopic evaluation of 
possible GI source of bleeding. Patient denies any change in bowel habits, 
weight changes, blood in stools, black tarry stools or abdominal pain. Denies 
family history of colon issues. The patient NOTES acid reflux.  
  
Mitesh has undergone prior endoscopy. Last EGD was in 2020 by Dr. Sandoval with findings ofgastritis, esophagitis, hiatal hernia. Last colonoscopy 
18 by Dr. Rios with removal of multiple adenomatous polyps, three year 
follow-up recommended.  
  
Patient's past medical history is significant for cirrhosis for which he follows
 with Gastroenterology. Notes mention possibility of esophageal varices which 
may require banding.  
  
PAST MEDICAL HISTORY  
Diagnosis Date  
Adenomatous colon polyp 8/3/2011  
Adjustment disorder with depressed mood 3/9/2012  
Aortic valve disorders 2007  
stenosis  
Asthma exacerbation 11/10/2012  
Calculus of ureter 2005  
Esophageal reflux 10/14/2011  
Essential hypertension 10/14/2005  
Impaired renal function 4/15/2010  
Impotence of organic origin 2006  
Intestinal polyp 8/3/2011  
Nonspecific abnormal results of liver function study 2006  
Obesity, unspecified  
CAREN on CPAP 10/13/2005  
Other abnormal blood chemistry  
Hyperuricemia  
Other and unspecified hyperlipidemia 10/14/2005  
Peripheral polyneuropathy 2/3/2015  
Polycystic kidney 2010  
Snoring  
Splenomegaly 9/10/2014  
Thrombocytopenia (HCC) 10/5/2011  
  
  
PAST SURGICAL HISTORY  
Procedure Laterality Date  
ARTHRP KNE CONDYLE&PLATU MEDIAL&LAT COMPARTMENTS Left 2017  
BAL. ASSIST ENTEROSCOPY W/ BX 2011  
CAPSULE ENDO SMALL BOWEL W EGD 2011  
COLONOSCOPY FLX DX W/COLLJ SPEC WHEN PFRMD   
Colonoscopy  
COLONOSCOPY FLX DX W/COLLJ SPEC WHEN PFRMD   
Colonoscopy  
COLONOSCOPY FLX DX W/COLLJ SPEC WHEN PFRMD 2011  
Colonoscopy  
COLONOSCOPY FLX DX W/COLLJ SPEC WHEN PFRMD 2018  
Colonoscopy  
COLONOSCOPY W/BIOPSY SINGLE/MULTIPLE 2008  
ESOPHAGOGASTRODUODENOSCOPY TRANSORAL DIAGNOSTIC 2018  
EGD  
ESOPHAGOGASTRODUODENOSCOPY TRANSORAL DIAGNOSTIC 2019  
EGD  
ESOPHAGOGASTRODUODENOSCOPY TRANSORAL DIAGNOSTIC 2020  
EGD  
EXCISION PILONIDAL CYST/SINUS SIMPLE 1964  
HEMORRHOIDECTOMY INTERNAL RUBBER BAND LIGATIONS 2018  
824 and 18  
OPTX FEM SHFT FX W/INSJ IMED IMPLT W/WO SCREW Left 2020  
L hip cephalo medullary nail.  
RPLCMT AORTIC VALVE OPN W/STENTLESS TISSUE VALVE 10/04/2011  
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  
RPR UMBILICAL HRNA 5 YRS/> REDUCIBLE 09/10/2010  
Hernia repair, umbilical >5yr  
  
Current Outpatient Medications  
Medication Sig  
furosemide (LASIX) 20 mg tablet TAKE 1 TABLET BY MOUTH EVERY OTHER RDAY  
atenolol (TENORMIN) 50 mg tablet Take 1 tablet by mouth once daily.  
ascorbic acid, vitamin C, (VITAMIN C) 500 mg tablet Take 1 tablet by mouth once 
daily.  
ascorbic acid-elderberry fruit 100-50 mg chew Take 2 tablets by mouth once 
daily.  
cyanocobalamin (VITAMIN B-12) 2,500 mcg tablet Take 2,500 mcg by mouth once 
daily.  
melatonin 10 mg tab Take 1 tablet by mouth as needed.  
aspirin, enteric coated (ASPIRIN, ENTERIC COATED) 81 mg EC tablet Take 81 mg by 
mouth once daily.  
albuterol HFA (PROVENTIL HFA, VENTOLIN HFA) 90 mcg/actuation inhaler Inhale 2 
Puffs as instructed every 4 hours as needed for wheezing/shortness of breath.  
omeprazole (PRILOSEC) 40 mg capsule Take 1 capsule by mouth once daily.  
allopurinol (ZYLOPRIM) 300 mg tablet Take 1 tablet by mouth once daily.  
magnesium oxide 400 mg magnesium cap Take 1 capsule by mouth once daily.  
fenofibrate nanocrystallized (TRICOR) 48 mg tablet Take 1 tablet by mouth once 
daily.  
sertraline (ZOLOFT) 50 mg tablet Take 1 tablet by mouth once daily.  
nitroglycerin sublingual (NITROQUICK) 0.4 mg SL tablet Dissolve 1 tablet under 
the tongue every 5 minutes as needed for chest pain. FOR CHEST PAIN. IF NO 
RELIEF CALL 911  
fluticasone (FLONASE) 50 mcg/actuation nasal spray Use 1 Spray in each nostril 
once daily.  
ferrous sulfate 325 mg (65 mg iron) EC tablet Take 1 tablet by mouth twice daily
 with meals. (Patient taking differently: Take 325 mg by mouth once daily.)  
oxybutynin ER (DITROPAN XL) 15 mg 24 hr Extended Rel Tab Take 2 tablets by mouth
 once daily.  
amoxicillin (POLYMOX, AMOXIL) 500 mg capsule Take four tablets, one hour prior 
to dental work (Patient taking differently: Take four tablets by mouth , one 
hour prior to dental work)  
betamethasone valerate 0.1 % lotion Apply to affected area twice daily.  
fluticasone-vilanterol (BREO ELLIPTA) 200-25 mcg/dose inhaler Inhale 1 
Inhalation as instructed once daily. Inhale one puff once daily. DO NOT CLICK 
OPEN UNTIL READY FOR DOSE  
multivitamin tablet Take 1 tablet by mouth once daily.  
lisinopril (ZESTRIL, PRINIVIL) 40 mg tablet Take 40 mg by mouth once daily. 
(Patient not taking: Reported on 2023)  
  
No current facility-administered medications for this visit.  
  
ALLERGIES: Bees, Acetaminophen, Hydrocodone, Lipitor [Atorvastatin Calcium], 
Singulair [MontelukastSodium], and Strawberries  
  
PERSONAL HISTORY:  
Social History  
  
Tobacco Use  
Smoking status: Never  
Smokeless tobacco: Never  
Vaping Use  
Vaping Use: Never used  
Substance Use Topics  
Alcohol use: No  
Drug use: No  
  
  
  
FAMILY HISTORY:  
FAMILY HISTORY  
Problem Relation Age of Onset  
Heart Mother  
 in 70's  
Coronary Artery Disease Father  
 in his 80's  
Emphysema Father  
Diabetes Brother  
  
REVIEW OF SYMPTOMS:  
The review of systems data was entered by the nurse and reviewed by me  
  
Nursing Notes:  
Karina Cabrera RN 2023 1:19 PM Signed  
REVIEW OF SYSTEMS:  
General: The patient NOTES fatigue, denies weight loss, denies weight gain, 
denies feeling hot, andNOTES feelings of cold.  
Eyes: The patient denies glaucoma, NOTES eye injury/surgery, wears glasses or 
contacts.  
Ear/Nose/Throat: The patient NOTES allergies, denies hayfever, denies ear 
infections, and denies bloody noses.  
Cardiovascular: The patient denies chest pain, denies heart disease, NOTES high 
blood pressure,denies cardiac stent, denies prior heart attack, denies irregular
heart beat, NOTES high cholesterol, denies poor circulation, NOTESheart failure,
other cardiac issues, denies claudication, denies cold feet, denies peripheral 
arterial stent.  
Respiratory: The patient denies tuberculosis, denies pneumonia, denies frequent 
cough, denies pulmonary embolism, NOTES shortness of breath, and denies coughing
up blood.  
Gastrointestinal: The patient denies difficulty swallowing, NOTES acid reflux, 
denies ulcers, denies vomiting, denies jaundice/hepatitis, denies gallbladder 
problems, denies black or tarry stools, denies hemorrhoids, denies bleeding from
rectum, NOTES diverticulitis, denies constipation, denies diarrhea, denies loss 
of stool control, and denies hernias.  
Kidney/Bladder: The patient denies kidney stones, denies urine infections, and 
denies bloody urine.  
Skin: The patient denies a history of skin cancer, denies bleeding/changing 
moles, and denies a history of skin rash.  
Neurologic: The patient denies a history of epilepsy/convulsions, denies 
headaches, denies head/spinal injuries, and denies stroke/TIA.  
Psychiatric: The patient denies psychiatric medications, denies depression, and 
denies voices, denies substance abuse.  
Endocrine: The patient denies thyroid disorders, denies diabetes, and denies 
hormonal problems.  
Hematologic: The patient denies a history of bruising, denies bleeding, and 
denies anemia, denies blood clots.  
Infections: The patient denies a history of measles and mumps, denies rheumatic 
fever, and denies sexually transmitted diseases.  
Musculoskeletal: The patient denies back pain/injury, denies back problems, 
denies sciatica, NOTES knee/foot trouble, NOTES arthritis, or denies gout.  
  
Last colon   
  
Karina Cabrera RN  
I have confirmed and edited as necessary, the PFSH and ROS obtained by others  
Nathalia Bernal PA-C  
  
  
PHYSICAL EXAMINATION:  
  
General: The patient is 83 year old male, well nourished, well hydrated in no 
acute distress. The patient is oriented to time, place, and person.  
  
VITALS: Blood pressure 140/80, pulse 110, temperature 37.2 C (98.9 F), height 
170.2 cm (5' 7 ), weight 114.3 kg (252 lb), SpO2 94 %. Body mass index is 39.47 
kg/m .  
  
HEENT: Normal cephalic, ataumatic, pupils are equally round, sclera are 
anicteric, mucous membranesare moist, oropharynx is clear. Neck has no masses, 
asymmetry or lymphadenopathy.  
  
Respiratory: Clear to auscultation and percussion. Normal respiratory excursion 
and pattern.  
  
Cardiac: Examination is regular rate and rhythm. Normal S1/S2  
  
Abdominal exam: Soft, nontender, with no palpable masses. No hepatosplenomegaly.
No palpable hernias.  
  
Extremities: no clubbing, cyanosis or edema. No adenopathy.  
  
LABORATORY VALUES: As Noted  
  
RADIOLOGIC STUDIES: As Noted  
  
Assessment  
IMPRESSION: history of colon polyps, anemia, cirrhosis  
  
PLAN: I have reviewed my findings with the surgeon. Patient referred for upper 
and lower endoscopy for evaluation of possible GI bleeding. Patient would 
require MAC due to medical comorbidities. Due to the possibility of esophageal 
varices which may require banding ([facility]), would recommend that patient have his upper and lower endoscopy 
performed by a gastroenterologist who could perform banding at time of endoscopy
if needed based on findings. This was relayed to the patient and his daughter, 
who verbalized understanding.  
  
  
Diagnoses: (D50.9) Iron deficiency anemia, unspecified iron deficiency anemia 
type (primary encounter diagnosis)  
(Z86.010) History of colonic polyps  
(Z87.19) History of cirrhosis  
  
Consultation requested by Dr. Ortega for an opinion regarding anemia. My final 
recommendations will be communicated back to the requesting physician by way of 
shared Medical record or letter to requesting physician via US mail.  
  
_____________________________  
Nathalia Bernal PA-C  
  
Electronically signed by Nathalia Bernal PA-C at 2023 8:16 AM EST  
  
documented in this encounterFirelands [facility]01- Nurse Note* Karina Cabrera RN - 2023 1:16 PM ESTFormatting of this note might be 
  different from the original.  
REVIEW OF SYSTEMS:  
General: The patient NOTES fatigue, denies weight loss, denies weight gain, 
denies feeling hot, andNOTES feelings of cold.  
Eyes: The patient denies glaucoma, NOTES eye injury/surgery, wears glasses or 
contacts.  
Ear/Nose/Throat: The patient NOTES allergies, denies hayfever, denies ear 
infections, and denies bloody noses.  
Cardiovascular: The patient denies chest pain, denies heart disease, NOTES high 
blood pressure,denies cardiac stent, denies prior heart attack, denies irregular
heart beat, NOTES high cholesterol, denies poor circulation, NOTESheart failure,
other cardiac issues, denies claudication, denies cold feet, denies peripheral 
arterial stent.  
Respiratory: The patient denies tuberculosis, denies pneumonia, denies frequent 
cough, denies pulmonary embolism, NOTES shortness of breath, and denies coughing
up blood.  
Gastrointestinal: The patient denies difficulty swallowing, NOTES acid reflux, 
denies ulcers, denies vomiting, denies jaundice/hepatitis, denies gallbladder 
problems, denies black or tarry stools, denies hemorrhoids, denies bleeding from
rectum, NOTES diverticulitis, denies constipation, denies diarrhea, denies loss 
of stool control, and denies hernias.  
Kidney/Bladder: The patient denies kidney stones, denies urine infections, and 
denies bloody urine.  
Skin: The patient denies a history of skin cancer, denies bleeding/changing 
moles, and denies a history of skin rash.  
Neurologic: The patient denies a history of epilepsy/convulsions, denies 
headaches, denies head/spinal injuries, and denies stroke/TIA.  
Psychiatric: The patient denies psychiatric medications, denies depression, and 
denies voices, denies substance abuse.  
Endocrine: The patient denies thyroid disorders, denies diabetes, and denies 
hormonal problems.  
Hematologic: The patient denies a history of bruising, denies bleeding, and 
denies anemia, denies blood clots.  
Infections: The patient denies a history of measles and mumps, denies rheumatic 
fever, and denies sexually transmitted diseases.  
Musculoskeletal: The patient denies back pain/injury, denies back problems, 
denies sciatica, NOTES knee/foot trouble, NOTES arthritis, or denies gout.  
  
Last colon 2019  
  
Karina Cabrera RN  
Electronically signed by Karina Cabrera RN at 2023 1:19 PM EST  
  
documented in this encounterFirelands [facility]01- Miscellaneous Notes* 
  Telephone Encounter - Zofia Altamirano [facility] - 01/15/2023 2:42 PM ESTFormatting of 
  this note might be different from the original.  
Unable to reach patient's daughter Dixie, who's number is in chart to call, just
rings and no  or voicemail. We can try again at a later time. Also 
patient is scheduled to be in the office later this week 2023, unable to 
determine based on the note below and patient's appointment desk what things 
have been schedule and what still needs scheduled.  
  
Zofia Altamirano Pss  
  
Electronically signed by Zofia Altamirano Pss at 01/15/2023 2:45 PM EST  
  
* Telephone Encounter - Marilin Sanchez - 2023 4:58 PM ESTSummary: AVS 
  23  
  
Formatting of this note is different from the original.  
Check out comments: Labs today.  
MRI Liver when able--patient would like done at [facility]--open MRI.  
Referral to Dr. Peabody for EGD/colonoscopy--possible varices.  
CT guided bone marrow biopsy at [facility] when able. Hold ASA 1 week prior.  
Referral to hepatology for cirrhosis.  
Schedule for 10 doses iron sucrose.  
CBC/CMP/Myeloma labs then OV after completes iron sucrose.  
  
Electronically signed by Marilin Sanchez at 2023 4:59 PM EST  
  
documented in this encounterFirelands [facility]01- NoteC[facility]innati [facility]01- History of Present illness Narrative* Maurizio Cochran PA-C - 
  2023 10:20 AM ESTFormatting of this note is different from the original.  
VIRTUAL VISIT FOLLOW UP  
  
I had a virtual visit with Mr. Braun today for follow up of cirrhosis.  
  
UPDATED HISTORY:  
CC: Patient presents with:  
Cirrhosis  
  
HPI: Mr. Braun is a 83 year old with past medical history of colon polyps, 
diverticulosis, internal hemorrhoids gastric ulcer , Esophageal reflux, obesity,
HLP, aortic valve disorder, polycystic kidney, CAREN on CPAP, thrombocytopenia, 
splenomegaly, hypertension, presenting today for evaluation of cirrhosis.  
Patient was last seen by GI CNP Radha Kong 2021.  
Here at request of Dr. Tello Ortega  
Daughter present during the visit  
  
At last visit:  
Upon chart review, had an abdominal CT in  with findings of fatty liver but 
no splenomegaly  
Most recent RUQ US showed a nodular contour of the liver with splenomegaly and a
new hyperechoic nodule in the right lobe of the liver  
An MRI liver w/wo cont has been ordered but not scheduled  
Last INR 2022 was wnl 1.1  
He's had thrombocytopenia since   
Per Dr. Ortega:  
He also has macrocytic anemia and thrombocytopenia. Prior reports of 
splenomegaly. I personally reviewed CT images from  and . In 2014, 
spleen measured up to about 16 cm in craniocaudal dimension. No mention of fatty
liver or other morphologic changes of the liver to suggest cirrhosis.  
  
Interval hx:  
Believes he was told he had liver lesions previously, which were benign.  
He has never been told he has a fatty liver or cirrhosis until recent testing  
May be going for bone marrow biopsy  
Has never had a liver biopsy  
Does fit the metabolic syndrome  
No alcohol use now, never a big drinker  
Has leg swelling, usually progressive as the day goes on, better upon waking, 
sometimes left swellsmore than right  
Eating better now with meals on wheels, likes soup and salads  
Daughter inquiring if he could have hepatic encephalopathy, has memory issues  
  
Risk Factors for Liver Disease:  
1. Blood transfusions before : yes  
2. IVDA: No  
3. Intranasal coccaine use: No  
4. Tattoos: No  
5.  Service: yes, [employer] (received vaccines)  
6. High risk sexual behavior: No  
7. Alcohol: none in older age, only beer in younger years-occasionally socially  
8. Obesity: yes  
9. Hyperlipidemia: yes  
10. Prolonged exposure to hepatotoxic meds: No  
11. Other autoimmune disorders No  
No daily tylenol  
OTC: multivitamin, elderberry gummies, vitamin c, vitamin b12  
  
Metabolic Syndrome Risk factors:   
1) Diabetes/ Abnormal FBS >100mg/dL:yes  
2) Hypertension : yes  
3)Triglycerides more then 150 : yes  
4) HDL (<50 female and <40 male): yes  
5) Central obesity ( Waist >102 men and >88 female) - Body mass index is 38.89 
kg/(m^2).  
Weight is relatively stable, lost weight around 60 pounds when wife passed away  
Appetite is better, on meals on wheels  
  
Complications of Cirrhosis:  
1. Ascites: No  
2. SBP: No  
3. Non-bleeding varices: No  
4. Variceal hemorrage: No  
5. Portosystemic encephalopathy: No  
6. Hepatorenal Syndrome: No  
7. Hepatopulmonary Syndrome: No  
8. Hepatic hydrothorax: No  
9. Recurrent Cholangitis (PSC): No  
  
Family hx: no cirrhosis, liver cancer  
Surgical hx: hip replacement  
  
Denies current issues with ascites, HE, hematemesis, hematochezia, confusion, 
dark urine, josselyn colored stool, nausea, vomiting, weight loss, early satiety, 
bloating, dysphagia, odynophagia, change inbm. Remaining systems reviewed and 
are negative.  
  
MELD-Na score: 7 at 2022 4:21 PM  
MELD score: 7 at 2022 4:21 PM  
Calculated from:  
Serum Creatinine: 1.01 mg/dL at 2022 4:21 PM  
Serum Sodium: 135 mmol/L at 2022 4:21 PM  
Total Bilirubin: 0.5 mg/dL (Using min of 1 mg/dL) at 2022 4:21 PM  
INR(ratio): 1.1 at 2022 4:21 PM  
Age: 82 years  
  
PAST MEDICAL HISTORY  
Diagnosis Date  
Adenomatous colon polyp 8/3/2011  
Adjustment disorder with depressed mood 3/9/2012  
Aortic valve disorders 2007  
stenosis  
Asthma exacerbation 11/10/2012  
Calculus of ureter 2005  
Esophageal reflux 10/14/2011  
Essential hypertension 10/14/2005  
Impaired renal function 4/15/2010  
Impotence of organic origin 2006  
Intestinal polyp 8/3/2011  
Nonspecific abnormal results of liver function study 2006  
Obesity, unspecified  
CAREN on CPAP 10/13/2005  
Other abnormal blood chemistry  
Hyperuricemia  
Other and unspecified hyperlipidemia 10/14/2005  
Peripheral polyneuropathy 2/3/2015  
Polycystic kidney 2010  
Snoring  
Splenomegaly 9/10/2014  
Thrombocytopenia (HCC) 10/5/2011  
  
PAST SURGICAL HISTORY  
Procedure Laterality Date  
ARTHRP KNE CONDYLE&PLATU MEDIAL&LAT COMPARTMENTS Left 2017  
BAL. ASSIST ENTEROSCOPY W/ BX 2011  
CAPSULE ENDO SMALL BOWEL W EGD 2011  
COLONOSCOPY FLX DX W/COLLJ SPEC WHEN PFRMD   
Colonoscopy  
COLONOSCOPY FLX DX W/COLLJ SPEC WHEN PFRMD   
Colonoscopy  
COLONOSCOPY FLX DX W/COLLJ SPEC WHEN PFRMD 2011  
Colonoscopy  
COLONOSCOPY FLX DX W/COLLJ SPEC WHEN PFRMD 2018  
Colonoscopy  
COLONOSCOPY W/BIOPSY SINGLE/MULTIPLE 2008  
ESOPHAGOGASTRODUODENOSCOPY TRANSORAL DIAGNOSTIC 2018  
EGD  
ESOPHAGOGASTRODUODENOSCOPY TRANSORAL DIAGNOSTIC 2019  
EGD  
ESOPHAGOGASTRODUODENOSCOPY TRANSORAL DIAGNOSTIC 2020  
EGD  
EXCISION PILONIDAL CYST/SINUS SIMPLE 1964  
HEMORRHOIDECTOMY INTERNAL RUBBER BAND LIGATIONS 2018 and 18  
OPTX FEM SHFT FX W/INSJ IMED IMPLT W/WO SCREW Left 2020  
L hip cephalo medullary nail.  
RPLCMT AORTIC VALVE OPN W/STENTLESS TISSUE VALVE 10/04/2011  
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  
RPR UMBILICAL HRNA 5 YRS/> REDUCIBLE 09/10/2010  
Hernia repair, umbilical >5yr  
  
FAMILY HISTORY  
Problem Relation Age of Onset  
Heart Mother  
 in 70's  
Coronary Artery Disease Father  
 in his 80's  
Emphysema Father  
Diabetes Brother  
  
Social History  
  
Tobacco Use  
Smoking status: Never  
Smokeless tobacco: Never  
Vaping Use  
Vaping Use: Never used  
Substance Use Topics  
Alcohol use: No  
Drug use: No  
  
Current Outpatient Medications  
Medication Sig Dispense Refill  
iv contrast (will be provided with radiology test) MRI Liver Inject, 
intravenously, once for 1 dose. No IV access, insert saline lock prior to the 
beginning of sedation, infusion, injection of imaging exam. Discontinue saline 
lock post exam. If Pt. has a central line or IVAD, may access for administration
according to line specific nursing protocol. Once exam is complete flush line 
and de-access according to line specific nursing protocol in the MR contrast 
administration guidelines link. 1 Each 0  
ascorbic acid-elderberry fruit 100-50 mg chew Take 2 tablets by mouth once 
daily.  
cyanocobalamin (VITAMIN B-12) 2,500 mcg tablet Take 2,500 mcg by mouth once 
daily.  
melatonin 10 mg tab Take 1 tablet by mouth as needed.  
aspirin, enteric coated (ASPIRIN, ENTERIC COATED) 81 mg EC tablet Take 81 mg by 
mouth once daily.  
albuterol HFA (PROVENTIL HFA, VENTOLIN HFA) 90 mcg/actuation inhaler Inhale 2 
Puffs as instructed every 4 hours as needed for wheezing/shortness of breath.  
omeprazole (PRILOSEC) 40 mg capsule Take 1 capsule by mouth once daily. 90 
capsule 3  
allopurinol (ZYLOPRIM) 300 mg tablet Take 1 tablet by mouth once daily. 90 
tablet 3  
magnesium oxide 400 mg magnesium cap Take 1 capsule by mouth once daily. 90 
capsule 3  
fenofibrate nanocrystallized (TRICOR) 48 mg tablet Take 1 tablet by mouth once 
daily. 90 tablet 3  
sertraline (ZOLOFT) 50 mg tablet Take 1 tablet by mouth once daily. 90 tablet 3  
nitroglycerin sublingual (NITROQUICK) 0.4 mg SL tablet Dissolve 1 tablet under 
the tongue every 5 minutes as needed for chest pain. FOR CHEST PAIN. IF NO 
RELIEF CALL 911 25 tablet 1  
fluticasone (FLONASE) 50 mcg/actuation nasal spray Use 1 Spray in each nostril 
once daily. 3 Each 3  
ferrous sulfate 325 mg (65 mg iron) EC tablet Take 1 tablet by mouth twice daily
with meals. (Patient taking differently: Take 325 mg by mouth once daily.) 180 
tablet 1  
oxybutynin ER (DITROPAN XL) 15 mg 24 hr Extended Rel Tab Take 2 tablets by mouth
once daily. 180 tablet 3  
amoxicillin (POLYMOX, AMOXIL) 500 mg capsule Take four tablets, one hour prior 
to dental work (Patient taking differently: Take four tablets by mouth , one 
hour prior to dental work) 4 capsule 1  
betamethasone valerate 0.1 % lotion Apply to affected area twice daily. 60 mL 0  
fluticasone-vilanterol (BREO ELLIPTA) 200-25 mcg/dose inhaler Inhale 1 
Inhalation as instructed once daily. Inhale one puff once daily. DO NOT CLICK 
OPEN UNTIL READY FOR DOSE 0  
multivitamin tablet Take 1 tablet by mouth once daily. 0  
  
No current facility-administered medications for this visit.  
  
ALLERGIES  
Allergen Reactions  
Bees Anaphylaxis  
Acetaminophen Other: See Comments  
Hydrocodone GI Upset  
Lipitor [Atorvastat* Itching  
Singulair [[place]el[place]* Itching  
Strawberries Hives  
hives  
  
REVIEW OF SYSTEMS:  
PAIN ASSESSMENT: Negative for pain, history of chronic pain, or current 
treatment for a chronic pain condition.  
  
GENERAL: No weight loss, malaise or fevers  
RESPIRATORY: Negative for cough, hemoptysis, wheezing, COPD, dyspnea or 
shortness of breath  
CARDIOVASCULAR: Negative for chest pain, leg swelling, CHF or palpitations  
GI: No nausea, vomiting, or diarrhea  
: Not reviewed  
GYN: Not reviewed  
  
PHYSICAL FINDINGS OF NOTE:  
General - Normal, healthy, cooperative, in no acute distress  
Able to interact verbally by video conference  
Psych - ORIENTATION: normal to time place, person and situation  
Mood/Affect: AFFECT AND MOOD: Normal  
Head/Neuro - Normal size and shape Facial appearance normal  
Pulmonary - respiratory effort normal  
Cardiovascular - patient describes extremities normal and warm  
Abdominal - Not performed  
Skin - abnormal lesions not visualized  
Motor - patient seen sitting with Normal appearing strength and coordination  
  
REVIEWED ITEMS  
RUQ US 2022:  
IMPRESSION:  
1. There is a new hyperechoic nodule in the right lobe of the liver  
which could be further evaluated by MRI  
2. Nodular contour of the liver which may be due to cirrhosis  
3. Splenomegaly  
  
EGD 2020  
Findings:  
The examined jejunum was normal.  
The examined duodenum was normal.  
Localized moderate inflammation characterized by erosions, erythema  
and friability was found in the gastric antrum. Biopsies were taken  
with a cold forceps for histology.  
A medium-sized hiatal hernia was present.  
Non-severe esophagitis with no bleeding was found.  
Impression: - Normal examined jejunum.  
- Normal examined duodenum.  
- Gastritis. Biopsied.  
- Medium-sized hiatal hernia.  
- Non-severe reflux esophagitis.  
Recommendation: - Discharge patient to home.  
- Resume previous diet.  
- Continue present medications.  
- Return to physician assistant in 1 week.  
  
Colonoscopy 2018:  
Findings:  
Four sessile polyps were found in the transverse colon. The polyps  
were 6 to 9 mm in size. These polyps were removed with a cold snare.  
Resection and retrieval were complete. Estimated blood loss was  
minimal. To prevent bleeding post-intervention, two hemostatic clips  
were successfully placed.  
Impression: - Four 6 to 9 mm polyps in the transverse colon,  
removed with a cold snare. Resected and retrieved.  
Clips were placed.  
Recommendation: - Discharge patient to home.  
- Written discharge instructions were provided to  
the patient.  
- Resume previous diet.  
- Await pathology results.  
- Repeat colonoscopy in 3 years for surveillance.  
- Patient has a contact number available for  
emergencies. The signs and symptoms of potential  
delayed complications were discussed with the  
patient. Return to normal activities tomorrow.  
Written discharge instructions were provided to the  
patient.  
  
IMPRESSION  
Mr. Braun is a 83 year old with past medical history of colon polyps, 
diverticulosis, internal hemorrhoids gastric ulcer , Esophageal reflux, obesity,
 HLP, aortic valve disorder, polycystic kidney, CAREN on CPAP, thrombocytopenia, 
splenomegaly, hypertension, presenting today for evaluation of cirrhosis. Noted 
to have a cirrhotic appearing liver on recent RUQ US, has had thrombocytopenia 
for years,will be undergoing bone marrow biopsy. He fits the metabolic syndrome,
 placing him at risk for CÁRDENAS/NAFLD with progression to cirrhosis. Discussed 
definitive diagnosis involves a transjugular liver biopsy but he agreed to defer
 such an invasive procedure. Will monitor him as if he has cirrhosis so as to 
stay on top of potential complications, of which he has none at present, leg 
swelling is likely cardiac in nature. Discussed pathophysiology of cirrhosis 
along with signs of decompensation.  
  
RECOMMENDATION:  
MELD labs with chronic liver dx panel  
Liver lesion/HCC Screen: MRI liver w/wo cont now (may need general anesthesia at
 [facility] vs lorazepam 1 hour before MRI, will prescribe if needed, once 
scheduled). Discussed HCC Screening needed every 6 months  
Portal HTN/EV screen: EGD now, has upcoming apt to discuss EGD/colonoscopy, will
 let me know results of that visit and I will prescribe EGD if needed, may need 
variceal banding  
HE: none, discussed trial of lactulose for goal of ~3bm daily, will let me know 
if interested  
LE edema/ Ascites: none, diuretics as prescribed. Discussed <2,000 mg sodium 
diet  
Continue abstinence from alcohol, no issue for him  
Screening colonoscopy: to be discussed at upcoming visit  
HCM/Immunizations: check immunity for HAV/HBV And vaccine if needed  
Metabolic syndrome: tight control essential, to be monitored/treated by PCP. 
Weight loss 5-10% bodyweight helpful. Mediterranean diet/increased 
cardiovascular exercise with goal >45 minutes 5 days weekly  
Avoid liver detox cleanse/supplements  
Not to exceed 2,000 mg tylenol daily  
  
Follow up once blood work, EGD and liver MRI are completed, can be virtual.  
  
I spent a total of 45 minutes on the date of the service which included 
preparing to see the patient, face-to-face patient care, completing clinical 
documentation, obtaining and/or reviewing separately obtained history, 
performing a medically appropriate examination, counseling and educating the pat
ient/family/caregiver, ordering medications, tests, or procedures, communicating
 with other HCPs (not separately reported), and communicating results to the 
patient/family/caregiver.  
  
Maurizio Cochran PA-C  
2023 11:17 AM  
  
  
  
Electronically signed by Maurizio Cochran PA-C at 2023 11:20 AM EST  
  
documented in this encounterFirelands [facility]01- NoteC[facility]12- NoteC[facility]12- History of Present 
illness Narrative* Siria Daly RDMS - 2022 9:15 AM ESTFormatting of 
  this note might be different from the original.  
Radiology Service Progress Note  
  
PATIENT NAME: Mitesh Braun  
MRN: [id number]  
  
DATE OF SERVICE: 2022  
TIME: 10:00 AM  
PATIENT IDENTITY VERIFICATION COMPLETED USING TWO (2) IDENTIFIERS: Name and Date
 of Birth confirmedby patient verbally.  
FALL SCREENING: Has the patient had 2 falls in the last year or 1 fall with 
injury or currently using an Ambulatory Assistive Device (Walker, Cane, 
Wheelchair, Crutches, etc.)? Yes, Patient High Riskfor Falls  
What interventions were put in place to prevent falls during this visit? 
Instructed Patient to Callfor Help if Needed, Offered Assistance with 
Transfers/Clothing, Instructed Patient to Remain Seated(Not on Exam Table) Until
 Exam, and Increased Observations by Caregivers  
PATIENT GENDER DATA: Male  
PATIENT RELEVANT IMPLANT DATA REVIEWED: Not Applicable  
  
RADIOLOGY DEPARTMENT: Ultrasound  
  
PERIPHERAL IV DATA: Not applicable  
  
SIGNED BY: Siria Daly RDMS RVT  
2022 10:00 AM  
  
  
Electronically signed by Siria Daly RDMS at 2022 10:00 AM EST  
  
documented in this encounterFirelands Regional Medical Center [facility]12- NoteCincinnati VA [facility] 
illness Narrative* Tello Ortega,  - 2022 3:17 PM ESTFormatting of this 
  note is different from the original.  
Patient referred by SAMIA Zurita for anemia. The impression and plan 
will be communicated by way of the shared electronic record or faxed under 
separate cover letter.  
  
HPI: The patient is an 83 yo male with a PMH significant for HTN, polycystic 
kidney disease (seeingnephrology for ~15 years), HLD, aortic repair (bovine 
valve), CAD, CAREN (CPAP), asthma, hypercalcemia, splenomegaly (noted on CT chest 
2014; spleen up to 18.4 cm in AP dimension; not enlarged on CT enterography 
) and obesity.  
  
He had lab work ordered at his nephrologist office. Protein electrophoresis of 
the urine demonstrated no evidence of monoclonal spike. Urine protein creatinine
 ratio was elevated. Chemistry panel showed a creatinine of 1.33 mg/dL. Calcium 
mildly elevated 10.6 mg/dL. Vitamin D39.7 PTH 93.4 CBC significant for a total 
white count of 4900. No differential performed. Hemoglobin 9.0 g/dL. . 
Platelet count 79,000. Protein electrophoresis of the serum revealed a M spike 
but no immunofixation was performed.  
  
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 Worse over time.
 He's never been given an answer as to why.  
  
Balance is off and he uses wheeled walker.  
  
[place]. Has home health aides for cleaning and washing 
cl[facility]. Daughter lives close by and he gets meals on wheels.  
  
Was having left posterior hip pain and got relief with Absorsene Jr.  
  
PAST MEDICAL HISTORY  
Diagnosis Date  
Adenomatous colon polyp 8/3/2011  
Adjustment disorder with depressed mood 3/9/2012  
Aortic valve disorders 2007  
stenosis  
Asthma exacerbation 11/10/2012  
Calculus of ureter 2005  
Esophageal reflux 10/14/2011  
Essential hypertension 10/14/2005  
Impaired renal function 4/15/2010  
Impotence of organic origin 2006  
Intestinal polyp 8/3/2011  
Nonspecific abnormal results of liver function study 2006  
Obesity, unspecified  
CAREN on CPAP 10/13/2005  
Other abnormal blood chemistry  
Hyperuricemia  
Other and unspecified hyperlipidemia 10/14/2005  
Peripheral polyneuropathy 2/3/2015  
Polycystic kidney 2010  
Snoring  
Splenomegaly 9/10/2014  
Thrombocytopenia (HCC) 10/5/2011  
  
PAST SURGICAL HISTORY  
Procedure Laterality Date  
ARTHRP KNE CONDYLE&PLATU MEDIAL&LAT COMPARTMENTS Left 2017  
BAL. ASSIST ENTEROSCOPY W/ BX 2011  
CAPSULE ENDO SMALL BOWEL W EGD 2011  
COLONOSCOPY FLX DX W/COLLJ SPEC WHEN PFRMD   
Colonoscopy  
COLONOSCOPY FLX DX W/COLLJ SPEC WHEN PFRMD   
Colonoscopy  
COLONOSCOPY FLX DX W/COLLJ SPEC WHEN PFRMD 2011  
Colonoscopy  
COLONOSCOPY FLX DX W/COLLJ SPEC WHEN PFRMD 2018  
Colonoscopy  
COLONOSCOPY W/BIOPSY SINGLE/MULTIPLE 2008  
ESOPHAGOGASTRODUODENOSCOPY TRANSORAL DIAGNOSTIC 2018  
EGD  
ESOPHAGOGASTRODUODENOSCOPY TRANSORAL DIAGNOSTIC 2019  
EGD  
ESOPHAGOGASTRODUODENOSCOPY TRANSORAL DIAGNOSTIC 2020  
EGD  
EXCISION PILONIDAL CYST/SINUS SIMPLE 1964  
HEMORRHOIDECTOMY INTERNAL RUBBER BAND LIGATIONS 2018 and 18  
OPTX FEM SHFT FX W/INSJ IMED IMPLT W/WO SCREW Left 2020  
L hip cephalo medullary nail.  
RPLCMT AORTIC VALVE OPN W/STENTLESS TISSUE VALVE 10/04/2011  
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  
RPR UMBILICAL HRNA 5 YRS/> REDUCIBLE 09/10/2010  
Hernia repair, umbilical >5yr  
  
ALLERGIES  
Allergen Reactions  
Bees Anaphylaxis  
Acetaminophen Other: See Comments  
Hydrocodone GI Upset  
Lipitor [Atorvastat* Itching  
Singulair [Monteluk* Itching  
Strawberries Hives  
hives  
  
Current Outpatient Medications  
Medication Sig  
ascorbic acid-elderberry fruit 100-50 mg chew Take 2 tablets by mouth once 
daily.  
cyanocobalamin (VITAMIN B-12) 2,500 mcg tablet Take 2,500 mcg by mouth once 
daily.  
melatonin 10 mg tab Take 1 tablet by mouth as needed.  
aspirin, enteric coated (ASPIRIN, ENTERIC COATED) 81 mg EC tablet Take 81 mg by 
mouth once daily.  
albuterol HFA (PROVENTIL HFA, VENTOLIN HFA) 90 mcg/actuation inhaler Inhale 2 
Puffs as instructed every 4 hours as needed for wheezing/shortness of breath.  
omeprazole (PRILOSEC) 40 mg capsule Take 1 capsule by mouth once daily.  
allopurinol (ZYLOPRIM) 300 mg tablet Take 1 tablet by mouth once daily.  
magnesium oxide 400 mg magnesium cap Take 1 capsule by mouth once daily.  
fenofibrate nanocrystallized (TRICOR) 48 mg tablet Take 1 tablet by mouth once 
daily.  
sertraline (ZOLOFT) 50 mg tablet Take 1 tablet by mouth once daily.  
nitroglycerin sublingual (NITROQUICK) 0.4 mg SL tablet Dissolve 1 tablet under 
the tongue every 5 minutes as needed for chest pain. FOR CHEST PAIN. IF NO 
RELIEF CALL 911  
fluticasone (FLONASE) 50 mcg/actuation nasal spray Use 1 Spray in each nostril 
once daily.  
ferrous sulfate 325 mg (65 mg iron) EC tablet Take 1 tablet by mouth twice daily
 with meals.  
oxybutynin ER (DITROPAN XL) 15 mg 24 hr Extended Rel Tab Take 2 tablets by mouth
 once daily.  
amoxicillin (POLYMOX, AMOXIL) 500 mg capsule Take four tablets, one hour prior 
to dental work (Patient taking differently: Take four tablets by mouth , one 
hour prior to dental work)  
fluticasone-vilanterol (BREO ELLIPTA) 200-25 mcg/dose inhaler Inhale 1 
Inhalation as instructed once daily. Inhale one puff once daily. DO NOT CLICK 
OPEN UNTIL READY FOR DOSE  
multivitamin tablet Take 1 tablet by mouth once daily.  
betamethasone valerate 0.1 % lotion Apply to affected area twice daily.  
  
No current facility-administered medications for this visit.  
  
Social History  
  
Tobacco Use  
Smoking status: Never  
Smokeless tobacco: Never  
Substance Use Topics  
Alcohol use: No  
Drug use: No  
  
ROS:  
Constitutional: Denies episodes of fever and night sweats.  
Neuro: See above. Believes he may have recently had a stroke. He has mild left 
hemiparesis.  
HEENT: No recent change in voice, vision or hearing.  
Resp: Denies cough, wheeze and hemoptysis. Denies shortness of breath at rest.  
CVS: Denies exertional chest pain, PND, orthopnea. Chronic bilateral lower 
extremity swelling.  
GI: No abdominal pain. Bowels move twice a day. Formed stools. Black from iron. 
No blood observed..  
: Denies dysuria or gross hematuria.  
Endo: Denies hot flashes. Denies polyuria and polydipsia. Denies heat and cold 
intolerance.  
Musculoskeletal: No musculoskeletal pain other than left hip pain as outlined 
above.  
Derm: Denies rash. Denies jaundice and diffuse pruritis.  
Heme: Denies unusual bleeding and unexplained bruising.  
Psych: Normal mood.  
  
PHYSICAL EXAM:  
Vitals: Blood pressure 134/81, pulse 93, temperature 36.7 C (98 F), temperature 
source Temporal, weight 110.9 kg (244 lb 8 oz).  
Well-appearing and in no acute distress.  
EYES: Sclerae are anicteric bilaterally.  
ENT: Oral mucosa is unremarkable. There is no sign of thrush or mucositis.  
LYMPHATIC: There is no palpable cervical, supraclavicular or axillary 
adenopathy.  
RESPIRATORY: Inspiratory breath sounds are of normal intensity in all fields. No
 rales, wheezes or rhonchi.  
CARDIOVASCULAR: Rhythm is regular.  
ABDOMEN: The abdomen is nondistended. No organomegaly. No tenderness.  
Extremities: Bilateral lower extremity swelling. Right somewhat worse than left.
 Overlying pitting.  
SKIN: No jaundice.  
NEUROLOGIC: CNs II-XII are grossly intact. He is not able to step up on the exam
 table on his own. Needs at least a 1 person assist. May have some mild left leg
 weakness but due to his general overall weakness difficult to get a good 
neurologic exam.  
  
ASSESSMENT/PLAN:  
(D47.2) Monoclonal gammopathy (primary encounter diagnosis)  
(D53.9) Macrocytic anemia  
(D69.6) Thrombocytopenia (HCC)  
Assessment:  
-The patient is an 82-year-old male with a past medical history as outlined 
above. He has no history of diabetes but has longstanding worsening sensory 
neuropathy of the feet and left hand. He has polycystic kidney disease as well 
as chronic mild hypercalcemia with elevation of PTH and stable serumcreatinine 
over time. Recently found to have possible serum monoclonal antibody on 
electrophoresis of the serum. Urine negative for monoclonal protein on 
electrophoresis.  
-He also has macrocytic anemia and thrombocytopenia. Prior reports of 
splenomegaly. I personally reviewed CT images from  and . In 2014, 
spleen measured up to about 16 cm in craniocaudal dimension. No mention of fatty
 liver or other morphologic changes of the liver to suggest cirrhosis.  
-I discussed the broad differential of the anemia and thrombocytopenia with the 
patient and his daughter. We also discussed the spectrum of plasma cell 
disorders and the further work-up indicated to accurately diagnose.  
Plan:  
-CBC with staff review.  
-Assess TSH, B12, RBC folate, serum copper and cold agglutinins.  
-Assess serum viscosity.  
-Ultrasound liver and spleen.  
-Repeat protein electrophoresis of serum including immunofixation.  
-24-hour urine collection for protein electrophoresis and immunofixation.  
-Check iron studies in light of his chronic kidney disease.  
-Check coagulation times and fibrinogen rule out low-level DIC is a potential 
cause of thrombocytopenia.  
-OV after above resulted.  
-May need EMG/NCV as part of the work-up depending on results above.  
  
I spent a total of 75 minutes on the date of the service which included 
preparing to see the patient, face-to-face patient care, completing clinical 
documentation, obtaining and/or reviewing separately obtained history, 
performing a medically appropriate examination, counseling and educating the pat
ient/family/caregiver, ordering medications, tests, or procedures, communicating
 with other HCPs (not separately reported), independently interpreting results 
(not separately reported), communicatingresults to the patient/family/caregiver,
 and care coordination (not separately reported).  
  
Tello Ortega DO  
Electronically signed by Tello Ortega DO at 2022 4:00 PM EST  
  
documented in [facility]Fire[facility]- Miscellaneous Notes* 
  Telephone Encounter - Chinyere Oliver LPN - 2022 10:01 AM ESTFormatting of
   this note might be different from the original.  
pcp reviewed the rehabilitation and sport therapy note pt was seen there for 
driving assessment.  
Pcp signed form. This has been faxed back to [facility] at [phone]  
  
Electronically signed by Chinyere Oliver LPN at 2022 10:07 AM EST  
  
documented in this encounterFirelands [facility]11- NoteHNO ID: [id number]  
Author: Ade Vazquez OT/L  
Service: ?  
Author Type: Occupational Therapist  
Type: Progress Notes  
Filed: 2022 3:31 PM  
Note Text:  
Episode Visit Count: 1  
Start of Care Date: 22  
Onset Date: (R CVA suspected per recent neurologist visit while MRI  
ordered; diagnosis also of peripheral polyneuropathy)  
Patient Identified by Name and Date of Birth: Yes  
REHABILITATION AND SPORTS THERAPY  
OCCUPATIONAL THERAPY INSTRUMENTAL ADL AND COMMUNITY MOBILITY EVALUATION  
SUBJECTIVE: Mitesh Braun is a 82 year old male seen today for OT  
functional mobility assessment due to diagnosis of peripheral  
polyneuropathy while also suspected R CVA. His daughter was with him and  
provided helpful information throughout. She shared that he has taken  
some significant falls earlier this year and that he had aide assist for  
some time but increased it to 4 days/week after his fall in August outside  
then again 1 week later. More recent fall 3 weeks ago while at high risk  
for falling. She shared that his wife passed away 2020 so he  
has been living on his own since they but she has increased her  
involvement and support in the time since.  
Functional Limitations: walking in the community;walking;bending;heavy  
exertion;stair negotiation;driving  
Prior Level of Function: Required assistance  
Home Environment  
Patient Lives With: Self/Alone  
Assistance Available: Part-Time;Home Aide (daughter involved living around  
the corner from patient and seeing him/checking in frequently)  
Home Type: Multi-Level  
Transportation: Travels as a passenger;SUV  
Patient Goals: to return to driving  
Intake Information: Prescription present  
Falls Interview: Two or more falls in the last year  
Relevant History  
Past Relevant Medical Conditions: Arthritis;Cerebral Vascular  
Accident;Falls;Hypertension;Depression;Neuropathy  
Highest Level of Education: Trade School  
Preferred Language: English  
Right or Left Handed: Right  
Employment: Retired (was )  
Recreation / Current Exercise: no current exercise  
Hobbies / Interests: reads devotionals and cookbooks; rather sedentary  
Home Environment  
Patient Lives With: Self/Alone  
Assistance Available: Part-Time;Home Aide (daughter involved living around  
the corner from patient and seeing him/checking in frequently)  
Home Type: Multi-Level  
Transportation: Travels as a passenger;SUV  
Activities of Daily Living: Modified Independent and supervision for  
showering  
Instrumental Activities of Daily Living: assist for most tasks in the  
home while some light meal preparation on his own; does take his own  
medication but his daughter sets up as well as manages the finances which  
she has been doing since his wife passed away  
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  
State: [place] License/Permit #: [id number] Expires: 25  
Restrictions: corrective lenses; intrastate CDL  
5 Yr. Violation HX: no 5 Yr. MVA HX: no  
Handicap Parking Placard: YES  
_____________________________________________________________________  
1. Mitesh Braun self report indicates an awareness of: Weakness,  
incoordination, or limited motion in L arm and B legs  
Decreased balance  
Tingling or Numbness in: B hands and feet  
Forgetting new information  
2. Mitesh Braun expressed confidence regarding driving when driving  
alone and on familiar road ways.  
3. Mitesh Braun expressed concerns regarding driving at  
night/decreased light conditions, in congested traffic, on the interstate,  
and on long trips.  
OBJECTIVE MEASURES WITH LEVEL OF FUNCTION:  
SENSORIMOTOR ASSESSMENT:  
Hand dominance: Right  
Level of Function Relevant to I ADL, Community Mobility, and Driving:  
Right UE: Sufficient  
Left UE: Marginal while significant contractures/tone in L hand  
: functional for R hand while decreased for L hand  
Right LE: Marginal  
Left LE: Sufficient  
Sitting Balance: Sufficient  
Head / Neck: decreased neck ROM including rotation to right  
Ambulation: Insufficient while requires rollator to maximize safety with  
repeated history of falls in last several months with increased  
vulnerability if out in the community on own  
Transfers: Marginal  
Loading of Device: NA while did not observe him trying to fold/stow his  
rollator while decreased balance issues  
ASSESSMENT OF SENSORIMOTOR FUNCTION: Marginal for Driving  
_____________________________________________________________________  
VISION SCREENING:  
Vision  
Vision Deficits: Wears corrective lenses  
Corrective lenses: bifocals with correction for close up only; does not  
wear always; has had B cataract surgery in past; reports having floaters  
in both eyes  
Corrective Lenses:  
Wears glasses / contact lenses for driving  
Distant Acuity:  
Binocular: 20 / 40  
Nighttime Glare:  
Right: 20 / 40-1  
Left: 20 / 50-1  
Color Perception: pass  
Depth Perception: Pass  
Contrast Sensitivity: mini (more content not included)...[facility]
2022 History of Present illness Narrative* Ade Vazquez OT/MAYANK - 
  2022 4:50 PM ESTFormatting of this note might be different from the 
  original.  
Episode Visit Count: 1  
Start of Care Date: 22  
Onset Date: (R CVA suspected per recent neurologist visit while MRI ordered; 
diagnosis also of peripheral polyneuropathy)  
Patient Identified by Name and Date of Birth: Yes  
  
REHABILITATION AND SPORTS THERAPY  
OCCUPATIONAL THERAPY INSTRUMENTAL ADL AND COMMUNITY MOBILITY EVALUATION  
  
SUBJECTIVE: Mitesh Braun is a 82 year old male seen today for OT functional 
mobility assessmentdue to diagnosis of peripheral polyneuropathy while also 
suspected R CVA. His daughter was with himand provided helpful information 
throughout. She shared that he has taken some significant falls earlier this 
year and that he had aide assist for some time but increased it to 4 days/week 
after his fall in August outside then again 1 week later. More recent fall 3 
weeks ago while at high risk for falling. She shared that his wife passed away 
2020 so he has been living on his own since they but she has 
increased her involvement and support in the time since.  
  
Functional Limitations: walking in the community;walking;bending;heavy 
exertion;stair negotiation;driving  
  
Prior Level of Function: Required assistance  
  
Home Environment  
Patient Lives With: Self/Alone  
Assistance Available: Part-Time;Home Aide (daughter involved living around the 
corner from patient and seeing him/checking in frequently)  
Home Type: Multi-Level  
Transportation: Travels as a passenger;SUV  
  
Patient Goals: to return to driving  
  
Intake Information: Prescription present  
  
  
Falls Interview: Two or more falls in the last year  
  
Relevant History  
Past Relevant Medical Conditions: Arthritis;Cerebral Vascular 
Accident;Falls;Hypertension;Depression;Neuropathy  
Highest Level of Education: Trade School  
Preferred Language: English  
Right or Left Handed: Right  
Employment: Retired (was )  
Recreation / Current Exercise: no current exercise  
Hobbies / Interests: reads devotionals and cookbooks; rather sedentary  
Home Environment  
Patient Lives With: Self/Alone  
Assistance Available: Part-Time;Home Aide (daughter involved living around the 
corner from patient and seeing him/checking in frequently)  
Home Type: Multi-Level  
Transportation: Travels as a passenger;SUV  
  
  
Activities of Daily Living: Modified Independent and supervision for showering  
Instrumental Activities of Daily Living: assist for most tasks in the home while
 some light meal preparation on his own; does take his own medication but his 
daughter sets up as well as manages the finances which she has been doing since 
his wife passed away  
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  
State: [place] License/Permit #: [id number] Expires: 25  
Restrictions: corrective lenses; intrastate CDL  
5 Yr. Violation HX: no 5 Yr. MVA HX: no  
Handicap Parking Placard: YES  
_____________________________________________________________________  
  
1. Mitesh Braun self report indicates an awareness of: Weakness, 
incoordination, or limited motion in L arm and B legs  
Decreased balance  
Tingling or Numbness in: B hands and feet  
Forgetting new information  
2. Mitesh Braun expressed confidence regarding driving when driving alone 
and on familiar road ways.  
3. Mitesh Braun expressed concerns regarding driving at night/decreased 
light conditions, in congested traffic, on the interstate, and on long trips.  
  
OBJECTIVE MEASURES WITH LEVEL OF FUNCTION:  
SENSORIMOTOR ASSESSMENT:  
  
Hand dominance: Right  
Level of Function Relevant to I ADL, Community Mobility, and Driving:  
Right UE: Sufficient  
Left UE: Marginal while significant contractures/tone in L hand  
: functional for R hand while decreased for L hand  
Right LE: Marginal  
Left LE: Sufficient  
Sitting Balance: Sufficient  
Head / Neck: decreased neck ROM including rotation to right  
Ambulation: Insufficient while requires rollator to maximize safety with 
repeated history of falls in last several months with increased vulnerability if
 out in the community on own  
Transfers: Marginal  
Loading of Device: NA while did not observe him trying to fold/stow his rollator
 while decreased balance issues  
  
ASSESSMENT OF SENSORIMOTOR FUNCTION: Marginal for Driving  
_____________________________________________________________________  
  
VISION SCREENING:  
Vision  
Vision Deficits: Wears corrective lenses  
Corrective lenses: bifocals with correction for close up only; does not wear 
always; has had B cataract surgery in past; reports having floaters in both eyes  
  
Corrective Lenses:  
Wears glasses / contact lenses for driving  
Distant Acuity:  
Binocular: 20 / 40  
Nighttime Glare:  
Right: 20 / 40-1  
Left: 20 / 50-1  
  
Color Perception: pass  
Depth Perception: Pass  
Contrast Sensitivity: minimal decrease for R eye while severe impairment for L 
eye  
Peripheral Vision: Not sufficient for driving  
Right Eye: Failed to recognize stimuli: 35 degrees nasally, 55 degrees, 70 
degrees, and 85 degrees  
Left Eye: Failed to recognize stimuli: 70 degrees and 85 degrees  
Pursuits: Inaccurate / Overshoots  
Saccades: Inaccurate / Overshoots  
Convergence: WFL  
Nystagmus: Not Apparent  
Diplopia: No complaints  
Strabismus: No OU  
Cataracts: No OU  
Glaucoma: No. OU  
Other Eye Conditions / Diseases Reported: reports having floaters in B eyes  
  
ASSESSMENT OF VISUAL FUNCTION: Not Suggestive of Safe Driving Potential  
____________________________________________________________________  
  
COGNITIVE / PERCEPTUAL ASSESSMENT:  
  
SHORT BLESSED TEST  
  
Short Blessed Test  
1. What Year Is It Now?: Correct  
2. What Month Is It Now?: Correct  
3. What Time is it? (WIthin 1 hour): Correct  
4. Count Aloud Backwards 20 to 1 (Errors): 0  
5. Months of the Year in Reverse Order (Errors): 2  
6. Memory Phrase (Errors) : 5  
Short Blessed Final Score: 14  
  
Short Blessed Test Scorin-8; Normal to minimal impairment  
9-19; Moderate impairment  
20-28; Severe impairment  
[email or web address]  
  
Immediate Recall: WFL @ 5/6 digits  
  
Visual Scanning/Attention:  
Trail Making Part B (sec): 319 sec  
50th percentile norm for age group: 70-79; Part A: 80 seconds, Part B: 196 
seconds  
  
Homero Clock Drawing Test: Mitesh ARAUZ Kade correctly included 2/8 criteria for 
this test. He failed to include or correctly place: all 12 hours in correct 
numeric order, starting with 12 at the top  
only the numbers 1-12 (no duplicates, omissions, or foreign markings)  
the numbers equally, or nearly so, from each other  
the numbers equally spaced, or nearly so, from the edge of the [place]  
one clock hand at the two o'clock position  
only two clock hands  
  
According to The Physician's Guide to Assessing and Counseling Older Drivers, 
2003, any incorrect element in the Homero Clock Scoring signals a need for 
intervention.  
  
Motor Free Visual Perception Test: MVPT Total Score: 25  
MVPT Processing time (seconds): 10.1  
Norms: 70-81 y/o: Raw Score 25-35; Processing Time 4.5-7.1 seconds +/- .5 
seconds  
  
Visual Inattention / Unilateral Neglect: Not Apparent  
  
ASSESSMENT OF COGNITIVE / PERCEPTUAL FUNCTION: Not Suggestive of Safe Driving 
Potential  
  
___________________________________________________________________  
Altaf  Simulator:  
Simple Brake Reaction Time: Average Distance: 67 feet (Normal = 60 feet)  
R foot pedal operation method; was provided with instructions and time for 
practice prior to screening  
  
  
Education:  
Education  
Learning Preferences: Explanation  
Barriers: Cognitive Limitations;Hearing Deficit  
Learning/educational needs: Safety;Plan of Care  
Education Provided: Yes, see treatment interventions for education provided  
Education Provided To: Patient;Family (daughter)  
Education Mode/Type: Explanation/Discussion  
Response to Education/Teach Back: States/Identifies  
  
TREATMENT:  
Evaluation  
Self-Care Home Management:  
1: refer to documentation for details  
2: provided education and support to patient and his daughter  
Skilled Intervention: Skilled judgment in the selection of proper modification 
for activity of daily living/home management based on clinical presentation, 
deficits, and needs.  
Educated the patient regarding recommendations and provided written instruction 
to facilitate compliance.  
Reviewed patient specific diagnosis in relation to activities of daily 
living/home management.  
Activity progression based on professional judgement.  
  
PLAN OF CARE:  
  
SUMMARY AND RECOMMENDATIONS  
*The information in this report indicates the ability of the  to operate a
 motor vehicle on this date only. Due to the complex nature of the safe 
operation of a motor vehicle, and considering the demands of integrating 
changing environmental conditions, and visual, cognitive, and physical skills, 
successful completion of this program is not a guarantee of safe driving in the 
future.  
________________________________________________________________  
  
ASSESSMENT OF INSTRUMENTAL ADL AND COMMUNITY MOBILITY: Mitesh Braun presents
 with the diagnosisof peripheral polyneuropathy as well as suspected of R CVA. 
He presents with impairments of overshooting with oculomotor skills, history of 
significant falls/significant fall risk, decreased neck ROM, required aide 
assist for ADL/IADL's, decreased L eye glare far acuity/contrast 
sensitivity/DOES NOT MEET THE VISUAL FIELD SCREENING REQUIREMENTS FOR [facility] 
FOR DRIVING, CLOCK DRAWING SCORE, PERFORMANCE ON VISUAL PERCEPTUAL SCREENING 
INCLUDING FOR RAW SCORE AND SLOWED VISUAL PROCESSING SPEED, DECREASED 
CONCENTRATION AND RECALL MEMORY, MARKED IMPAIRMENT ON ALTERNATING ATTENTION 
TASK.  
  
RECOMMENDATIONS:  
ADL/IADL Recommendations: ongoing assist for self care and home management as 
needed while aides currently coming in 4 days/week  
Driving Recommendations: REFRAIN FROM DRIVING WHILE PERMANENT 
CESSATION/senior care FROM DRIVING INDICATED. THIS THERAPIST WILL PROVIDE 
PHYSICIAN WITH [facility] REPORTING INFORMATION SO THAT LICENSE SUSPENSION PROCESS
 CAN BE INITIATED  
Miller's Visual Field Exam Requested: Yes  
Recommended Complete Eye Exam: Yes  
  
Planned Interventions, Frequency, and Duration: Current Frequency: 1 visit  
Duration: 1 visit  
Total Number of Visits Planned: 0  
  
Patient demonstrates fair understanding of plan of care and treatment. The above
 results were discussed and agreed upon by patient/family.  
  
  
Billing: Total Treatment Time Minutes (timed/untimed) 135 minutes  
Evaluation - Moderate Complexity (78148)  
Self Care / Home Management (97231): 1:1 time: 75 minutes (5 units: 68-82 mins)  
Total time: 135 minutes  
  
CARLOS ENRIQUE Lunsford, CDRS, CDI  
Certified  Rehabilitation Specialist  
  
Electronically signed by CARLOS ENRIQUE Pelaez at 2022 3:31 PM EST  
  
documented in this encounterFirelands [facility]11- Miscellaneous Notes* 
  Telephone Encounter - Inga Gutierres - 2022 11:43 AM ESTFormatting of this
   note might be different from the original.  
Patient scheduled   
Electronically signed by Inga Gutierres at 2022 11:43 AM EST  
  
* Telephone Encounter - Inga Gutierres - 2022 11:23 AM ESTFormatting of this
   note might be different from the original.  
Spoke to patient. He is going to talk to his daughter to see when she can bring 
him. Patient does not drive anymore. Patient or daughter will call back. Call 
back number provided.  
Electronically signed by Inga Gutierres at 2022 11:24 AM EST  
  
* Telephone Encounter - Angela Vazquez - 2022 10:23 AM ESTFormatting of 
  this note might be different from the original.  
Referral faxed to [facility].  
  
Angela Vazquez  
  
Electronically signed by Angela Vazquez at 2022 10:23 AM EST  
  
* Telephone Encounter - Angela Vazquez - 2022 11:41 AM EDTFormatting of 
  this note might be different from the original.  
New Patient referral received and given to Dr. Newby for review.  
  
DX: ABNORMAL ELECTROPHORESIS/LOW HEMOGLOBIN  
  
REF PROV: ALAYNA PUGH  
  
INS: MEDICARE A & B/MMO SUPPLEMENT  
  
Angela George  
  
Electronically signed by Angela Vazquez at 2022 11:42 AM EDT  
  
documented in this [facility]- Miscellaneous Notes* 
  Telephone Encounter - Nathalia Peterson RN - 2022 8:49 AM EDTFormatting 
  of this note might be different from the original.  
Pts daughter called and is notified of providers message and instructions. She 
voices understanding.  
  
Nathalia Peterson RN  
  
Electronically signed by Nathalia Peterson RN at 2022 8:50 AM EDT  
  
* Telephone Encounter - Júnior Ahuja MD - 2022 6:16 PM EDT
  Formatting of this note might be different from the original.  
Noted carvedilol discontinued. Continue to monitor BP and call nephrology or 
here for concerns.  
Electronically signed by Júnior Ahuja MD at 2022 6:18 PM EDT  
  
* Telephone Encounter - Lenore Silva Pss - 2022 1:05 PM EDTFormatting 
  of this note might be different from the original.  
Daughter Dixie is calling in regards to patient seeing kidney doctor Dr Alayna Pugh at [facility] yesterday. She states BP was 102/70 but 
hemoglobin was at 9. Provider reduced Carvedilol medication to 6.25 mg. Also 
told him to take Blood Pressure reading in the morning before any medication. If
 top number is under 130 to call her office. This morning BP was 106/60. Dixie 
thencalled Dr Pugh office and patient was then advised to stop the 
Carvedilol. Advised Dixie to call Dr Pugh office to have lab results fax to
 our office.  
Electronically signed by Lenore Silva Pss at 2022 1:14 PM EDT  
  
documented in this [facility]- Instructions* Patient 
  Instructions*   
  
Júnior Ahuja MD - 10/17/2022 2:53 PM EDT  
  
Formatting of this note might be different from the original.  
DISCONTINUE LISINOPRIL.  
Electronically signed by Júnior Ahuja MD at 10/17/2022 2:53 PM EDT  
  
  
  
documented in this encounterFirelands [facility]10- History of Present 
illness Narrative* Júnior Ahuja MD - 10/17/2022 2:34 PM EDTFormatting of 
  this note is different from the original.  
This note was created using [employer].  
Subjective  
Mitesh Braun is a 82 year old male here with daughter. He fell again and was
 in the ER  forhead injury. He was scheduled with neurology. His 
hypertension was still well controlled on one half dose of lisinopril. He was 
scheduled to see his nephrologist in 2 weeks.  
  
Daughter was concerned about his ability to drive safely. We discussed options 
and he felt stronglythat he could still drive safely with left leg weakness. He 
agreed to a driving evaluation.  
  
Review of Systems  
Constitutional: Negative.  
Respiratory: Negative.  
Cardiovascular: Negative.  
Gastrointestinal: Negative.  
Musculoskeletal: Positive for gait problem.  
Neurological: Positive for weakness. Negative for dizziness, light-headedness 
and headaches.  
  
ACTIVE PROBLEM LIST  
Essential hypertension  
Caren On Cpap  
Esophageal Reflux  
Gout  
Mixed hyperlipidemia  
Impaired Fasting Glucose  
Aortic Valve Disorder  
Polycystic Kidney  
Adenomatous Colon Polyp  
Thrombocytopenia (HCC)  
Cad (Coronary Artery Disease), Native Coronary Artery  
Adjustment Disorder With Depressed Mood  
Asthma Exacerbation  
Peripheral Polyneuropathy  
Splenomegaly  
Ckd (Chronic Kidney Disease) Stage 3, Gfr 30-59 Ml/Min ([facility])  
Abnormality of Gait  
Anemia of Chronic Disease  
Urge Incontinence of Urine  
Obesity, Class II, Bmi 35-39.9  
Frequent Falls  
  
Current Outpatient Medications  
Medication Sig  
ascorbic acid-elderberry fruit 100-50 mg chew Take 2 tablets by mouth once 
daily.  
cyanocobalamin (VITAMIN B-12) 2,500 mcg tablet Take 2,500 mcg by mouth once 
daily.  
melatonin 10 mg tab Take 1 tablet by mouth as needed.  
aspirin, enteric coated (ASPIRIN, ENTERIC COATED) 81 mg EC tablet Take 81 mg by 
mouth once daily.  
albuterol HFA (PROVENTIL HFA, VENTOLIN HFA) 90 mcg/actuation inhaler Inhale 2 
Puffs as instructed every 4 hours as needed for wheezing/shortness of breath.  
lisinopril (ZESTRIL) 40 mg tablet Take 0.5 tablets by mouth once daily. ONE HALF
 TABLET DAILY.  
omeprazole (PRILOSEC) 40 mg capsule Take 1 capsule by mouth once daily.  
carvedilol (COREG) 12.5 mg tablet Take 1 tablet by mouth twice daily with meals.  
allopurinol (ZYLOPRIM) 300 mg tablet Take 1 tablet by mouth once daily.  
magnesium oxide 400 mg magnesium cap Take 1 capsule by mouth once daily.  
fenofibrate nanocrystallized (TRICOR) 48 mg tablet Take 1 tablet by mouth once 
daily.  
sertraline (ZOLOFT) 50 mg tablet Take 1 tablet by mouth once daily.  
nitroglycerin sublingual (NITROQUICK) 0.4 mg SL tablet Dissolve 1 tablet under 
the tongue every 5 minutes as needed for chest pain. FOR CHEST PAIN. IF NO 
RELIEF CALL 911  
fluticasone (FLONASE) 50 mcg/actuation nasal spray Use 1 Spray in each nostril 
once daily.  
ferrous sulfate 325 mg (65 mg iron) EC tablet Take 1 tablet by mouth twice daily
 with meals.  
oxybutynin ER (DITROPAN XL) 15 mg 24 hr Extended Rel Tab Take 2 tablets by mouth
 once daily.  
amoxicillin (POLYMOX, AMOXIL) 500 mg capsule Take four tablets, one hour prior 
to dental work (Patient taking differently: Take four tablets by mouth , one 
hour prior to dental work)  
betamethasone valerate 0.1 % lotion Apply to affected area twice daily.  
fluticasone-vilanterol (BREO ELLIPTA) 200-25 mcg/dose inhaler Inhale 1 
Inhalation as instructed once daily. Inhale one puff once daily. DO NOT CLICK 
OPEN UNTIL READY FOR DOSE  
multivitamin tablet Take 1 tablet by mouth once daily.  
  
No current facility-administered medications for this visit.  
  
  
Objective  
/56 (BP Site: Right Arm, BP Position: Sitting, BP Cuff Size: Large Adult) 
  Pulse 64   Temp 36.3 C (97.4 F) (Temporal)   Resp 16   Wt 112.9 kg (249 lb)   
BMI 38.42 kg/m  
Physical Exam  
Constitutional:  
General: He is not in acute distress.  
Cardiovascular:  
Rate and Rhythm: Normal rate and regular rhythm.  
Pulmonary:  
Breath sounds: Normal breath sounds.  
Musculoskeletal:  
Right lower leg: Edema present.  
Left lower leg: Edema present.  
Neurological:  
General: No focal deficit present.  
Mental Status: He is alert.  
Gait: Gait abnormal.  
Comments: Rollator dependent.  
  
Component Latest Ref Rng & Units 2022  
Protein, Total 6.3 - 8.0 g/dL 5.8 (L)  
Albumin 3.9 - 4.9 g/dL 3.5 (L)  
Calcium 8.5 - 10.2 mg/dL 10.8 (H)  
Bilirubin, Total 0.2 - 1.3 mg/dL 0.4  
Alkaline Phosphatase 38 - 113 U/L 85  
AST 14 - 40 U/L 30  
ALT 10 - 54 U/L 21  
Glucose 74 - 99 mg/dL 108 (H)  
BUN 9 - 24 mg/dL 33 (H)  
Creatinine 0.73 - 1.22 mg/dL 1.31 (H)  
Sodium 136 - 144 mmol/L 140  
Potassium 3.7 - 5.1 mmol/L 5.5 (H)  
Chloride 97 - 105 mmol/L 108 (H)  
CO2 22 - 30 mmol/L 25  
Anion Gap 9 - 18 mmol/L 7 (L)  
eGFR >=60 mL/min/1.73m 54 (L)  
WBC 3.70 - 11.00 k/uL 5.22  
RBC 4.20 - 6.00 m/uL 3.27 (L)  
Hemoglobin 13.0 - 17.0 g/dL 10.9 (L)  
Hematocrit 39.0 - 51.0 % 34.6 (L)  
MCV 80.0 - 100.0 fL 105.8 (H)  
MCH 26.0 - 34.0 pg 33.3  
MCHC 30.5 - 36.0 g/dL 31.5  
RDW-CV 11.5 - 15.0 % 15.0  
Platelet Count 150 - 400 k/uL 69 (L)  
MPV 9.0 - 12.7 fL 12.2  
Absolute nRBC <0.01 k/uL <0.01  
  
Assessment and Plan  
  
1. Peripheral polyneuropathy - ICD9: 356.9, ICD10: G62.9 (primary diagnosis)  
- To see neurology.  
- CONSULT TO OCCUPATIONAL THERE for driving safety evaluation.  
  
2. Abnormality of gait - ICD9: 781.2, ICD10: R26.9  
- CONSULT TO OCCUPATIONAL THER  
  
3. Stage 3a chronic kidney disease (HCC) - ICD9: 585.3, ICD10: N18.31  
- eGFR: Stable  
- Discontinue LISINOPRIL.  
- BASIC METABOLIC PNL in 2 weeks.  
- Nephrology in 2 weeks.  
  
4. Essential hypertension - ICD9: 401.9, ICD10: I10  
- good control  
- See #3.  
  
Júnior Ahuja MD  
  
  
Electronically signed by Júnior Ahuja MD at 10/17/2022 3:04 PM EDT  
  
documented in this encounterFirelands [facility]10- Miscellaneous Notes*  
  PT DISCHARGE - Brenda Hernandez PT - 10/12/2022 10:41 AM EDT
  Formatting of this note might be different from the original.  
SITUATION: private duty caregiver present during today's visit. patient reports 
the following sincethe last homecare visit: medications/allergies--no changes, 
no fall. patient reports he is doing good . States he would love to continue 
home PT if he could  
  
BACKGROUND: Diagnoses (reason for Home Care): falls  
Weight Bearing/Precaution Changes: no changes  
  
ASSESSMENT:  
Focus of visit: reassessment/discharge  
Explained to pt that he has plateaued and it would be a maintenance situation. 
[facility] him with his HEP during their 
visit  
  
Physical therapy discharged: goals achieved.  
  
Functional performance at discharge - bed mobility independent, transfers 
independent, ambulation independent and stairs supervision.  
  
Plan of care, goals, and discharge reviewed and agreed upon with patient and/or 
caregiver.  
  
RECOMMENDATION:  
Patient discharged from home health services.  
  
Instructions to include:home exercise program as directed  
  
See intervention summary for intervention/education details.  
Electronically signed by Brenda Hernandez PT at 10/12/2022 4:20 PM EDT  
  
documented in this encounterFirelands [facility]10- Miscellaneous Notes*  
  PT ROUTINE/REASSESSMENT/RECERT/CASE MGMT - Lalitha Pily, PTA - 10/10/2022 8:53
   AM EDTFormatting of this note might be different from the original.  
SITUATION:  
private duty caregiver present during today's visit. patient reports the 
following since the last homecare visit: medications/allergies--no changes, no 
fall. patient reports he is doing good .  
  
BACKGROUND:  
Diagnoses (reason for Home Care): falls  
  
Weight Bearing/Precaution Changes: no changes  
  
ASSESSMENT:  
Focus of visit seted and standing strength and balance exercises for HEP. 
patient remains challenged w/ seated left hip flexion but otherwise does well w/
 exercises. to continue w/ ww or rollator forall ambulation for safety  
  
Plan of care, goals, and visit frequency reviewed and agreed upon with patient 
and/or caregiver.  
  
Current Discharge Plan: family support  
Anticipate discharge by 10/12/22  
  
RECOMMENDATION:  
Next visit to focus on PT to see for possible DC  
  
See intervention summary for intervention/education details.  
Electronically signed by Lalitha Nascimento PTA at 10/10/2022 12:00 PM EDT  
  
documented in this encounterFirelands [facility]10- Miscellaneous Notes* HH 
  PT ROUTINE/REASSESSMENT/RECERT/CASE MGMT - Lalitha Nascimento PTA - 10/07/2022 2:31
   PM EDTFormatting of this note might be different from the original.  
SITUATION:  
only patient present during today's visit. patient reports the following since 
the last homecare visit: medications/allergies--no changes, no fall. patient 
reports he is doing ok today.  
  
BACKGROUND:  
Diagnoses (reason for Home Care): physicians referral for falls and neuropathy  
  
fall last week with L hip contusion  
  
Weight Bearing/Precaution Changes: no changes  
  
ASSESSMENT:  
Focus of visit resumed standing exercises for strengthening and balance. gait 
training w/ww, transfers  
  
Plan of care, goals, and visit frequency reviewed and agreed upon with patient 
and/or caregiver.  
  
Current Discharge Plan: independent with home exercise program  
Anticipate discharge by 10/22/22  
  
RECOMMENDATION:  
Next visit to focus on increase reps ifable  
  
See intervention summary for intervention/education details.  
Electronically signed by Lalitha Nascimento PTA at 10/07/2022 3:22 PM EDT  
  
documented in this encounterFirelands [facility]10- Miscellaneous Notes* 
  Telephone Encounter - Nathalia Peterson RN - 10/05/2022 3:02 PM EDTFormatting 
  of this note might be different from the original.  
Called and left a voicemail for the Patient's daughter to call back and ask for 
a nurse to receive the providers message. Faxed orders, last OV note, labs, and 
x-rays to [facility] at fax # [phone].  
  
Nathalia Peterson RN  
  
Electronically signed by Nathalia Peterson RN at 10/05/2022 3:05 PM EDT  
  
* Telephone Encounter - Júnior Ahuja MD - 10/05/2022 1:13 PM EDT
  Formatting of this note might be different from the original.  
ASSESSMENT/PLAN:  
1. Frequent falls - ICD9: V15.88, ICD10: R29.6 (primary diagnosis)  
- CONSULT TO NEUROLOGY  
  
2. Peripheral polyneuropathy - ICD9: 356.9, ICD10: G62.9  
- CONSULT TO NEUROLOGY  
  
Júnior Ahuja MD  
Electronically signed by Júnior Ahuja MD at 10/05/2022 1:13 PM EDT  
  
* Telephone Encounter - Melva Khalil RN - 10/03/2022 11:19 AM EDT
  Formatting of this note might be different from the original.  
Daughter (Dixie) calls to report that patient had another fall over the weekend.
 She is asking if provider would consider a consult to neurology for neuropathy 
and falls.  
  
Pended for review. Needs diagnosis. Dixie reports she would want it to be sent 
to [facility].  
  
Fax number is [phone].  
Phone number is [phone].  
  
Please review and advise,  
  
Melva Khalil RN  
  
  
Electronically signed by Melva Khalil RN at 10/03/2022 11:30 AM EDT  
  
documented in this encounterFirelands [facility]10- Miscellaneous Notes* 
  Telephone Encounter - Lalitha Nascimento PTA - 10/05/2022 2:35 PM EDTFormatting of 
  this note might be different from the original.  
Patient reported he fell on  when ambulating outside of home. Denies 
injury and was able to complete PT today without concerns. Thanks  
Electronically signed by Lalitha Nascimento PTA at 10/05/2022 2:37 PM EDT  
  
documented in this encounterFirelands [facility]10- Miscellaneous Notes* 
  Telephone Encounter - Son Norton Ma - 10/03/2022 1:03 PM EDTFormatting of this
   note might be different from the original.  
Okay given to Neida.  
Son Norton Ma  
  
Electronically signed by Son Norton Ma at 10/03/2022 1:03 PM EDT  
  
* Telephone Encounter - Krista Perez APRN.CNP - 10/03/2022 12:18 PM EDTFormatting 
  of this note might be different from the original.  
Shorty Perez APRN.CNP  
Electronically signed by Krista Perez APRN.CNP at 10/03/2022 12:19 PM EDT  
  
* Telephone Encounter - Starr Marquez LPN - 2022 1:04 PM EDTFormatting 
  of this note might be different from the original.  
Neida from [facility] PT calling she did her re-assessment and would like to 
continue visits 2 times weekly for 2 more weeks. She is asking for verbal order 
please. If have problem reaching her onwork phone can call her person phone 
number. Please advise  
Electronically signed by Starr Marquez LPN at 2022 1:08 PM EDT  
  
documented in this encounterFirelands [facility]09- Miscellaneous Notes*  
  PT ROUTINE/REASSESSMENT/RECERT/CASE MGMT - Neida Gutierrez, PT - 2022 11:24
   AM EDTFormatting of this note might be different from the original.  
SITUATION:  
private duty caregiver present during today's visit. patient reports the 
following since the last homecare visit: medications/allergies--no changes, no 
fall. patient reports he thinks he needs more PT, .  
  
BACKGROUND:  
  
Diagnoses (reason for Home Care): physicians referral for falls and neuropathy 
fall last week with L hip contusion  
  
Weight Bearing/Precaution Changes: no changes  
  
ASSESSMENT:  
Focus of visit: reassessment of functional ability. Pt has improved however has 
not achieved goals;only tolerated 3 .5 minutes of standing activity. RPE 4/10. 
He will benefit from continued PT intervention  
  
Plan of care, goals, and visit frequency reviewed and agreed upon with patient 
and/or caregiver.  
  
Current Discharge Plan: independent with home exercise program  
Anticipate discharge by 10/14/22  
  
RECOMMENDATION:  
continue PT 2w2  
Next visit to focus on endurance, ther ex, mobility training  
  
See intervention summary for intervention/education details.  
Electronically signed by Neida Gutierrez, PT at 10/02/2022 7:35 AM EDT  
  
documented in this encounterFirelands [facility]09- Miscellaneous Notes*  
  PT ROUTINE/REASSESSMENT/RECERT/CASE MGMT - Lalitha Pily, PTA - 2022 8:56
   AM EDTFormatting of this note might be different from the original.  
SITUATION:  
only patient present during today's visit. patient reports the following since 
the last homecare visit: medications/allergies--no changes, no fall. patient 
reports he is doing ok.  still not as strong as i want to be. .  
  
BACKGROUND:  
Diagnoses (reason for Home Care): physicians referral for falls and neuropathy 
fall last week with L hip contusion  
  
Weight Bearing/Precaution Changes: no changes  
  
ASSESSMENT:  
Focus of visit LE strength and balance exercises for HEP, gait training w/ww to 
increase safety w/ functional mobility. Challenged w/ tband and standing 
exercises. denies increased pain but did report of muscle fatigue after, L>R LE 
Issued NOMNC  
  
Plan of care, goals, and visit frequency reviewed and agreed upon with patient 
and/or caregiver.  
  
Current Discharge Plan: independent with home exercise program  
Anticipate discharge by TBD  
  
RECOMMENDATION:  
Next visit to focus on PT to see for possible DC or reassessment  
  
See intervention summary for intervention/education details.  
Electronically signed by Lalitha Nascimento PTA at 2022 9:50 AM EDT  
  
documented in this encounterFirelands [facility]09- Miscellaneous Notes*  
  PT ROUTINE/REASSESSMENT/RECERT/CASE MGMT - Brenda Hernandez, PT - 
  2022 9:01 AM EDTFormatting of this note might be different from the 
  original.  
SITUATION: only patient present during today's visit. patient reports the 
following since the last homecare visit: medications/allergies--no changes, no 
fall. patient reports he has felt really good for past 2 days..  
BACKGROUND: Diagnoses (reason for Home Care): physicians referral for falls and 
neuropathy fall last week with L hip contusion  
Weight Bearing/Precaution Changes: no changes  
ASSESSMENENT: advancing Le exercises Focusing on L HIp and bilateral hamstrings  
Plan of care, goals, and visit frequency reviewed and agreed upon with patient 
and/or caregiver.  
Current Discharge Plan: independent with home exercise program  
Anticipate discharge by 10/1/22  
RECOMMENDATION: Next visit to focus on resume standing exercises for strength 
and balance See intervention summary for intervention/education details.  
Electronically signed by Brenda Hernandez PT at 2022 4:22 PM EDT  
  
documented in this encounterFirelands [facility]09- Miscellaneous Notes*  
  PT ROUTINE/REASSESSMENT/RECERT/CASE MGMT - Lalitha Nascimento PTA - 2022 2:11
   PM EDTFormatting of this note might be different from the original.  
SITUATION:  
only patient present during today's visit. patient reports the following since 
the last homecare visit: medications/allergies--no changes, no fall. patient 
reports he has felt really good for past 2 days..  
  
BACKGROUND:  
Diagnoses (reason for Home Care): physicians referral for falls and neuropathy 
fall last week with L hip contusion  
  
Weight Bearing/Precaution Changes: no changes  
  
ASSESSMENT:Focus of visit outdoor ambulation including uneven terrain w/ 
rollator and SBA.! seated rest break needed due to LE muscle fatigue transfers 
on/off chair  
  
Plan of care, goals, and visit frequency reviewed and agreed upon with patient 
and/or caregiver.  
  
Current Discharge Plan: independent with home exercise program  
Anticipate discharge by 10/1/22  
  
RECOMMENDATION:  
Next visit to focus on resume standing exercises for strength and balance  
  
See intervention summary for intervention/education details.  
Electronically signed by Lalitha Nascimento PTA at 2022 3:38 PM EDT  
  
documented in this encounterFirelands [facility]09- Miscellaneous Notes*  
  PT ROUTINE/REASSESSMENT/RECERT/CASE MGMT - Brenda Hernandez, PT - 
  2022 2:21 PM EDTFormatting of this note might be different from the 
  original.  
SITUATION: only patient present during today's visit. patient reports the 
following since the last homecare visit: medications/allergies--no changes, no 
fall. patient reports that he is a little tired and his L knee is a little sore 
- reports he was standing a lot making pickles yesterday  
BACKGROUND: Diagnoses (reason for Home Care): physicians referral for falls and 
neuropathy fall last week with L hip contusion  
Weight Bearing/Precaution Changes: no changes  
ASSESSMENT: Focus of visit progressiom of strength exercises for HEP, gait 
training w/ww and balce activities. denies increased pain w/ exercises today but
 did report of muscle fatigue  
Plan of care, goals, and visit frequency reviewed and agreed upon with patient 
and/or caregiver.  
Current Discharge Plan: independent with home exercise program Anticipate 
discharge by TBD  
RECOMMENDATION: Next visit to focus on out door ambulation if able See 
intervention summary for intervention/education details.  
Electronically signed by Brenda Hernandez, PT at 2022 4:50 PM EDT  
  
documented in this encounterFire[facility]- Miscellaneous Notes*  
  PT ROUTINE/REASSESSMENT/RECERT/CASE MGMT - Lalitha Nascimento, PTA - 2022 2:00
   PM EDTFormatting of this note might be different from the original.  
SITUATION:  
only patient present during today's visit. patient reports the following since 
the last homecare visit: medications/allergies--no changes, no fall. patient 
reports he didnt sleep well last night and woke up with a stiff back.  
  
BACKGROUND:  
Diagnoses (reason for Home Care): physicians referral for falls and neuropathy 
fall last week with L hip contusion  
  
Weight Bearing/Precaution Changes: no changes  
  
ASSESSMENT:  
Focus of visit progressiom of strength exercises for HEP, gait training w/ww and
 balce activities. denies increased pain w/ exercises today but did report of 
muscle fatigue  
  
Plan of care, goals, and visit frequency reviewed and agreed upon with patient 
and/or caregiver.  
  
Current Discharge Plan: independent with home exercise program  
Anticipate discharge by TBD  
  
RECOMMENDATION:  
Next visit to focus on out door ambulation if able  
  
See intervention summary for intervention/education details.  
Electronically signed by Lalitha Nascimento PTA at 2022 3:20 PM EDT  
  
documented in this encounterFirelands [facility]09- Miscellaneous Notes*  
  PT ROUTINE/REASSESSMENT/RECERT/CASE MGMT - Lalitha Nascimento PTA - 2022 
  10:29 AM EDTFormatting of this note might be different from the original.  
SITUATION:  
private duty caregiver present during today's visit. patient reports the 
following since the last homecare visit: medications/allergies--no changes, no 
fall. patient reports he was only a little soreafter last PT visit . .  
  
BACKGROUND:  
Diagnoses (reason for Home Care): fall w/left hip contusion  
  
Weight Bearing/Precaution Changes: no changes  
  
ASSESSMENT:  
Focus of visit strengthening exexises for HEP and gait training w/ rollator  
  
Plan of care, goals, and visit frequency reviewed and agreed upon with patient 
and/or caregiver.  
  
Current Discharge Plan: family support  
Anticipate discharge by 10/1/22  
  
RECOMMENDATION:  
Next visit to focus on try standing hamstring curls and hip abduction  
  
See intervention summary for intervention/education details.  
Electronically signed by Lalitha Nascimento PTA at 2022 1:13 PM EDT  
  
documented in this encounterFirelands [facility]09- Miscellaneous Notes*  
  PT ROUTINE/REASSESSMENT/RECERT/CASE MGMT - Brenda Hernandez, PT - 
  2022 10:21 AM EDTFormatting of this note might be different from the 
  original.  
SITUATION: paid cg present during today's visit. patient reports  Im not too bad
 today  .  
BACKGROUND: Diagnoses (reason for Home Care): physicians referral for falls and 
neuropathy fall last week with L hip contusion  
Past Medical History: Peripheral polyneuropathy Contusion of left hip region, 
subsequent encounter Frequent falls Musc ular weakness Abnormality of gait 
Essential hypertension CAREN On Cpap Esophageal Reflux Gout Mixed hyperlipidemia 
Aortic Valve Disorder Polycystic Kidney Adenomatous Colon Polyp Thrombocytopenia
 CAD (Coronary Artery Disease), Native Coronary Artery Adjustment Disorder With 
Depr essed Mood Asthma Exacerbation Peripheral Polyneuropathy Splenomegaly CKD 
(Chronic Kidney Disease) Stage 3, GFR 30-59 mL/min Anemia of Chronic Disease 
Urge Incontinence of Urine Obesity, Class II, BMI 35-39.9  
Weight Bearing or Surgical Precautions: none  
  
ASSESSMENT:  
Focus of visit DAVID LE ther ex  
  
Plan of care, goals, and visit frequency reviewed and agreed upon with patient 
and/or caregiver.  
  
Current Discharge Plan: independent with home exercise program  
Anticipate discharge by 10/1/22  
  
RECOMMENDATION:  
Next visit to focus on assess for ex tolerance and DOMS , progress as able  
  
See intervention summary for intervention/education details.  
Electronically signed by Brenda Hernandez PT at 2022 5:23 PM EDT  
  
documented in this encounterFirelands [facility]09- Miscellaneous Notes*  
  PT SOC/KAYLEE/FOLLOW UP/OTHER - Brenda Hernandez PT - 2022 3:06 PM
   EDTFormatting of this note might be different from the original.  
SITUATION:  
only patient present during today's visit. patient reports  Im hoping you can 
help me stop falling .  
  
BACKGROUND:  
Diagnoses (reason for Home Care): physicians referral for falls and neuropathy  
fall last week with L hip contusion  
  
Past Medical History: Peripheral polyneuropathy  
Contusion of left hip region, subsequent encounter  
Frequent falls  
Musc ular weakness  
Abnormality of gait  
Essential hypertension  
CAREN On Cpap  
Esophageal Reflux  
Gout  
Mixed hyperlipidemia  
Aortic Valve Disorder  
Polycystic Kidney  
Adenomatous Colon Polyp  
Thrombocytopenia  
CAD (Coronary Artery Disease), Native Coronary Artery  
Adjustment Disorder With Depr essed Mood  
Asthma Exacerbation  
Peripheral Polyneuropathy  
Splenomegaly CKD (Chronic Kidney Disease) Stage 3, GFR 30-59 mL/min  
Anemia of Chronic Disease  
Urge Incontinence of Urine  
Obesity, Class II, BMI 35-39.9  
  
Weight Bearing or Surgical Precautions: none  
  
ASSESSMENT:  
[facility]. Reviewed and 
explained homecare services. Plan of care, goals, and visit frequency developed,
 reviewed, and agreed upon with patient and/or caregiver.  
  
Patient Goal: stop falling  
  
Patient will benefit from continued physical therapy to address the following 
deficits: strength, balance, gait and transfers.  
  
Current Discharge Plan: independent with home exercise program. Anticipate 
discharge by 10/1/22.  
  
RECOMMENDATION:  
Next visit to focus on developing HEP  
  
Agreeable to PT;  
  
See intervention summary for intervention/education details.  
Electronically signed by Brenda Hernandez PT at 2022 7:39 PM EDT  
  
documented in this encounterFirelands [facility] Medical [facility]08- Miscellaneous Notes* 
  Telephone Encounter - Lolis Cervantes RN - 2022 1:02 PM EDTFormatting 
  of this note might be different from the original.  
Spoke with patient's daughter, Dixie. Given message from provider's office. She 
verbalizes understanding.  
Lolis Cervantes RN  
Electronically signed by Lolis Cervantes RN at 2022 1:03 PM EDT  
  
* Telephone Encounter - Helen E Zollinger LPN - 2022 12:56 PM EDT
  Formatting of this note might be different from the original.  
Left message for Dixie/daughter to call & ask to speak to a nurse.  
Helen Zollinger LPN  
  
Electronically signed by Helen E Zollinger LPN at 2022 12:57 PM EDT  
  
* Telephone Encounter - Júnior Ahuja MD - 2022 12:15 PM EDT
  Formatting of this note might be different from the original.  
Referral for home therapy was reordered as clarified with home health.  
  
Patient should consider giving up driving.  
Electronically signed by Júnior Ahuja MD at 2022 12:17 PM EDT  
  
* Telephone Encounter - Melva Khalil RN - 2022 8:45 AM EDT
  Formatting of this note might be different from the original.  
Son-In-Law (Christian) calls in on behalf of daughter Dixie to let provider know 
that patient has had 3more falls over the weekend while at his camper. Patient 
had to be assisted up all 3 times. No injuries but he is no longer able to 
assist self up and it is taking patient a lot longer to go up and down the 
stairs to the camper.  
  
Christian asking about patient continuing to drive and orders for HH therapy. 
Notified Christian that patient would be notified from [facility] as referral is still 
active.  
  
Melva Khalil RN  
  
Electronically signed by Melva Khalil RN at 2022 8:55 AM EDT  
  
* Telephone Encounter - Lolis Cervantes RN - 2022 4:31 PM EDTFormatting 
  of this note might be different from the original.  
Patient's daughter calling to let PCP know that patient fell today and landed on
 his back and hit the back of his head/neck on a door. He is @ [facility] ER and has 
had a CT scan. His main complaint is neckpain. She says they have been unable to
 start PT due to more information needed. Please see Home Care note in Epic from
 Estephanie Rodriguez LPN sent yesterday.  
Daughter is requesting call back when PT can be initiated.  
Lolis Cervantes RN  
Electronically signed by Lolis Cervantes RN at 2022 4:37 PM EDT  
  
documented in this encounterFirelands [facility]08- Miscellaneous Notes* 
  Telephone Encounter - Neida Misael - 2022 9:47 AM EDTFormatting of this 
  note might be different from the original.  
Welcome Home Call:  
  
a. Date and Time: 9:48 AM 2022  
  
b. Contact name/relationship: Mitesh gomez Have you been active with any Home Care company in the last 60 days(such as 
help with bathing, filling medications, checking your blood pressure) ? No.  
  
d. [facility] will be providing your care, are you agreeable to 
starting these services? yes (yes or no)  
  
e. Do you have any upcoming appointments in the next few days, or restrictions 
to your schedule? yes  
We would come to see you in 24-48* from your discharge today; Are you agreeable 
to a visit in that time frame? yes (Yes/ No (if no, when would you like to be 
seen?))  
  
f. Caregiver: Patient is able to manage care independently  
  
Please keep our your medications both over the counter and prescribed out for 
the home care to review, your hospital discharge instructions and write down any
 questions you might have.  
  
In order to maintain a safe environment for our caregivers, [facility] 
[facility] requires anyanimals or weapons present in the home be located in a 
secured location.  
  
Our clinicians will call you the night before or the morning of the appointment.
 Their # may come up restricted but they'll leave a VM for you.  
  
In case you have any questions or concerns in the meantime, our # is 
[phone], option 1  
  
Thank you for your time and have a great day.  
  
Neida Douglas  
  
Electronically signed by Neida Douglas at 2022 9:51 AM EDT  
  
documented in this encounterFirelands [facility]08- Miscellaneous Notes* 
  Telephone Encounter - Estephanie Rodriguez LPN - 2022 4:19 PM EDTFormatting
   of this note might be different from the original.  
Thank you for the Home Care Service referral.  
  
In order to proceed, we require an addendum for the following information on the
 encounter on 2022, per Medicare guidelines.  
  
The patient is homebound because of _______________________.  
The patient requires homecare because of ____________________.  
  
AND  
  
We are unable to use Contusion of hip region, Frequent falls, Muscular weakness,
 and Abnormality ofgait as a primary diagnosis. Please update orders to include 
a primary diagnosis causing symptoms: Contusion of hip region, Frequent falls, 
Muscular weakness, and Abnormality of gait.  
  
Kindly, complete an addendum to your 2022 visit to support homecare needs 
for this patient.  
  
A new order for homecare will need to be submitted with the matching Face to 
Face encounter date noted.  
  
If you have any questions, [facility] Intake can be reached at [phone].  
  
Thank you in advance.  
  
Estephanie Rodriguez LPN  
Central Admissions Intake Nurse  
[phone]  
Electronically signed by Estephanie Rodriguez LPN at 2022 4:23 PM EDT  
  
documented in this encounterFirelands [facility] Medical [facility]08- Miscellaneous Notes* 
  Telephone Encounter - Helen E Zollinger LPN - 2022 1:21 PM EDT  
  
Formatting of this note might be different from the original.  
Home number does not have vm set up, left message on daughter's phone for 
Patient to call & speak to nurse for non-urgent results.  
Helen Zollinger LPN  
  
  
  
  
Electronically signed by Helen E Zollinger LPN at 2022 1:22 PM EDT  
  
  
* Telephone Encounter - Helen E Zollinger LPN - 2022 1:17 PM EDT  
  
Formatting of this note might be different from the original.  
----- Message from Júnior Ahuja MD sent at 8/3/2022 11:30 PM EDT -----  
Stable anemia, low platelets. Glucose and kidney function okay and stable.  
  
  
  
Electronically signed by Helen E Zollinger LPN at 2022 1:17 PM EDT  
  
  
documented in this encounterFirelands Regional Medical [facility]07- Instructions* Patient 
  Instructions*   
  
Júnior Ahuja MD - 2022 10:13 AM EDT  
  
  
  
Formatting of this note might be different from the original.  
BLOOD TEST TODAY, NON FASTING.  
  
  
  
Electronically signed by Júnior Ahuja MD at 2022 10:14 AM EDT  
  
  
  
  
documented in this encounterFirelands Regional Medical [facility]07- History of Present 
illness Narrative* Júnior Ahuja MD - 2022 10:04 AM EDT  
  
Formatting of this note is different from the original.  
This note was created using [employer].  
Subjective  
Mitesh Braun is a 82 year old male. He had no new concerns. He did not do 
his labs.  
  
He was supposed to have a colonoscopy last year, but this kept getting cancelled
 and rescheduled for one reason or another. He had a history of tubular adenoma.
 He preferred to stay local for the colonoscopy.  
  
Dr. Mccollum switched him to a BiPAP recently, due to air leaks.  
  
Review of Systems  
Constitutional: Negative.  
Respiratory: Negative for chest tightness and shortness of breath.  
Cardiovascular: Positive for leg swelling. Negative for chest pain and 
palpitations.  
Gastrointestinal: Negative for abdominal pain, blood in stool, constipation and 
diarrhea.  
Genitourinary: Negative.  
Musculoskeletal: Positive for gait problem.  
Hematological: Bruises/bleeds easily.  
  
ACTIVE PROBLEM LIST  
Essential hypertension  
Caren On Cpap  
Esophageal Reflux  
Gout  
Mixed hyperlipidemia  
Impaired Fasting Glucose  
Aortic Valve Disorder  
Polycystic Kidney  
Adenomatous Colon Polyp  
Thrombocytopenia (HCC)  
Cad (Coronary Artery Disease), Native Coronary Artery  
Adjustment Disorder With Depressed Mood  
Asthma Exacerbation  
Peripheral Polyneuropathy  
Splenomegaly  
Ckd (Chronic Kidney Disease) Stage 3, Gfr 30-59 Ml/Min (Hcc)  
Abnormality of Gait  
Anemia of Chronic Disease  
Urge Incontinence of Urine  
Obesity, Class II, Bmi 35-39.9  
  
PAST SURGICAL HISTORY  
Procedure Laterality Date  
ARTHRP KNE CONDYLE&PLATU MEDIAL&LAT COMPARTMENTS Left 2017  
BAL. ASSIST ENTEROSCOPY W/ BX 2011  
CAPSULE ENDO SMALL BOWEL W EGD 2011  
COLONOSCOPY FLX DX W/COLLJ SPEC WHEN PFRMD   
Colonoscopy  
COLONOSCOPY FLX DX W/COLLJ SPEC WHEN PFRMD   
Colonoscopy  
COLONOSCOPY FLX DX W/COLLJ SPEC WHEN PFRMD 2011  
Colonoscopy  
COLONOSCOPY FLX DX W/COLLJ SPEC WHEN PFRMD 2018  
Colonoscopy  
COLONOSCOPY W/BIOPSY SINGLE/MULTIPLE 2008  
ESOPHAGOGASTRODUODENOSCOPY TRANSORAL DIAGNOSTIC 2018  
EGD  
ESOPHAGOGASTRODUODENOSCOPY TRANSORAL DIAGNOSTIC 2019  
EGD  
ESOPHAGOGASTRODUODENOSCOPY TRANSORAL DIAGNOSTIC 2020  
EGD  
EXCISION PILONIDAL CYST/SINUS SIMPLE 1964  
HEMORRHOIDECTOMY INTERNAL RUBBER BAND LIGATIONS 2018 and 18  
OPTX FEM SHFT FX W/INSJ IMED IMPLT W/WO SCREW Left 2020  
L hip cephalo medullary nail.  
RPLCMT AORTIC VALVE OPN W/STENTLESS TISSUE VALVE 10/04/2011  
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  
RPR UMBILICAL HRNA 5 YRS/> REDUCIBLE 09/10/2010  
Hernia repair, umbilical >5yr  
  
  
Current Outpatient Medications  
Medication Sig  
omeprazole (PRILOSEC) 40 mg capsule Take 1 capsule by mouth once daily.  
carvedilol (COREG) 12.5 mg tablet Take 1 tablet by mouth twice daily with meals.  
allopurinol (ZYLOPRIM) 300 mg tablet Take 1 tablet by mouth once daily.  
magnesium oxide 400 mg magnesium cap Take 1 capsule by mouth once daily.  
fenofibrate nanocrystallized (TRICOR) 48 mg tablet Take 1 tablet by mouth once 
daily.  
sertraline (ZOLOFT) 50 mg tablet Take 1 tablet by mouth once daily.  
lisinopril (ZESTRIL) 40 mg tablet Take 1 tablet by mouth once daily.  
nitroglycerin sublingual (NITROQUICK) 0.4 mg SL tablet Dissolve 1 tablet under 
the tongue every 5 minutes as needed for chest pain. FOR CHEST PAIN. IF NO 
RELIEF CALL 911  
fluticasone (FLONASE) 50 mcg/actuation nasal spray Use 1 Spray in each nostril 
once daily.  
ferrous sulfate 325 mg (65 mg iron) EC tablet Take 1 tablet by mouth twice daily
 with meals.  
oxybutynin ER (DITROPAN XL) 15 mg 24 hr Extended Rel Tab Take 2 tablets by mouth
 once daily.  
amoxicillin (POLYMOX, AMOXIL) 500 mg capsule Take four tablets, one hour prior 
to dental work  
betamethasone valerate 0.1 % lotion Apply to affected area twice daily.  
fluticasone-vilanterol (BREO ELLIPTA) 200-25 mcg/dose inhaler Inhale 1 
Inhalation as instructed once daily. Inhale one puff once daily. DO NOT CLICK 
OPEN UNTIL READY FOR DOSE  
multivitamin tablet Take 1 tablet by mouth once daily.  
  
No current facility-administered medications for this visit.  
  
  
Objective  
/66 (BP Site: Left Arm, BP Position: Sitting, BP Cuff Size: Large Adult)  
 Pulse 60   Temp 36.4 C (97.6 F) (Temporal Artery)   Resp 16   Wt 113.4 kg (250 
lb)   BMI 38.58 kg/m  
Physical Exam  
Constitutional:  
General: He is not in acute distress.  
Eyes:  
Conjunctiva/sclera: Conjunctivae normal.  
Cardiovascular:  
Rate and Rhythm: Normal rate and regular rhythm.  
Heart sounds: No murmur heard.  
No gallop.  
Pulmonary:  
Breath sounds: Normal breath sounds.  
Abdominal:  
Palpations: Abdomen is soft.  
Tenderness: There is no abdominal tenderness.  
Musculoskeletal:  
Right lower le+ Pitting Edema present.  
Left lower le+ Pitting Edema present.  
Neurological:  
General: No focal deficit present.  
Mental Status: He is alert and oriented to person, place, and time.  
Comments: Using a rollator.  
  
Assessment and Plan  
  
1. Adenomatous polyp of colon, unspecified part of colon - ICD9: 211.3, ICD10: 
D12.6 (primary diagnosis)  
Refer to Dr. Sutton.  
- CONSULT TO GASTROENTEROLOGY  
  
2. Impaired fasting glucose - ICD9: 790.21, ICD10: R73.01  
Recheck.  
  
3. Stage 3 chronic kidney disease, unspecified whether stage 3a or 3b CKD (HCC) 
- ICD9: 585.3, ICD10: N18.30  
Recheck.  
  
4. Essential hypertension - ICD9: 401.9, ICD10: I10  
- good control  
- Continue current medication(s)  
- Reviewed risks of HTN and principles of treatment  
  
5. Anemia of chronic disease - ICD9: 285.29, ICD10: D63.8  
Recheck.  
  
6. Thrombocytopenia (HCC) - ICD9: 287.5, ICD10: D69.6  
Recheck.  
  
Júnior Ahuja MD  
  
  
  
Electronically signed by Júnior Ahuja MD at 2022 10:24 AM EDT  
  
  
documented in this encounterFirelands Regional Medical [facility]06- Miscellaneous Notes* 
  Telephone Encounter - Annmarie Akhtar MA - 2022 11:24 AM EDT  
  
Formatting of this note might be different from the original.  
Will put an appt. Note for F/U with PCP  to assist with scheduling in 
GASTRO.  
  
Annmarie Akhtar MA  
  
  
  
  
Electronically signed by Annmarie Akhtar MA at 2022 11:25 AM EDT  
  
  
* Telephone Encounter - Maribell Vanegas LPN - 2022 12:44 PM EDT  
  
Formatting of this note might be different from the original.  
Attempted to reach patient to scheduled follow up with Radha. No answer and 
unable to leave a message.  
  
Please have patient keep in mind in order to complete scopes he has to have 
someone bring him to the appointment for the scopes and take him home 
afterwards. He is not able to take public transportation for the scopes.  
  
  
  
  
Electronically signed by Maribell Vanegas LPN at 2022 8:44 AM EDT  
  
  
* Telephone Encounter - Radha Kong APRN.CNP - 06/15/2022 1:33 PM EDT  
  
Formatting of this note might be different from the original.  
He was to have schedule a EGD and Colonoscopy for further evaluation of anemia 
last year.  
  
He would need to reschedule with me to get worked up for scopes. \  
Radha Kong APRN.CNP  
  
  
  
  
Electronically signed by Radha Kong APRN.CNP at 06/15/2022 1:34 PM EDT  
  
  
* Telephone Encounter - Nathalia Peterson RN - 06/15/2022 9:45 AM EDT  
  
Formatting of this note might be different from the original.  
Pt reports he missed a call from us. Let Pt I couldn't find a note anywhere. The
 only thing I couldfind was that he needed to schedule his EGD and Colonoscopy. 
Pt reports provider wanted him to go to somewhere far away and Pt states he 
can't because he is an old man and would need to have someone take him. Pt 
states he is without electricity right now so he will have to call back to 
schedule it.  
  
  
  
Electronically signed by Nathalia Peterson RN at 06/15/2022 9:50 AM EDT  
  
  
documented in this encounterFirelands [facility]06- Miscellaneous Notes* 
  Telephone Encounter - Nathalia Peterson RN - 2022 3:00 PM EDT  
  
Formatting of this note is different from the original.  
Pt [facility]. I asked him 
if he had any swelling, and he reports that he has noticed his legs have been 
swelling since he has stopped. Pt asking if provider still wants him to take it.
 Pt also reports he does not have any Nitro, he ran his last bottle through the 
wash. Please call and advise. If provider still would like Pt on HCTZ, it would 
need to be added to medications that need ordered.  
  
Patient has been identified by name and date of birth: Yes  
Patient phones for refill(s):  
Pending Prescriptions Disp Refills  
OMEPRAZOLE 40 MG CAPSULE,DELAYED RELEASE 90 capsule 3  
Sig: Take 1 capsule by mouth once daily.  
JOHNNY: No  
CARVEDILOL 12.5 MG TABLET 180 tablet 3  
Sig: Take 1 tablet by mouth twice daily with meals.  
JOHNNY: No  
ALLOPURINOL 300 MG TABLET 90 tablet 3  
Sig: Take 1 tablet by mouth once daily.  
JOHNNY: No  
MAGNESIUM 400 MG (AS MAGNESIUM OXIDE) CAPSULE 90 capsule 3  
Sig: Take 1 capsule by mouth once daily.  
JOHNNY: No  
FENOFIBRATE NANOCRYSTALLIZED 48 MG TABLET 90 tablet 3  
Sig: Take 1 tablet by mouth once daily.  
JOHNNY: No  
SERTRALINE 50 MG TABLET 90 tablet 3  
Sig: Take 1 tablet by mouth once daily.  
JOHNNY: No  
LISINOPRIL 40 MG TABLET 90 tablet 3  
Sig: Take 1 tablet by mouth once daily.  
JOHNNY: No  
NITROGLYCERIN 0.4 MG SUBLINGUAL TABLET 25 tablet 3  
Sig: Dissolve 1 tablet under the tongue as needed. FOR CHEST PAIN. IF NO RELIEF 
CALL 911  
JOHNNY: No  
  
  
Date of last office visit in primary care: 22  
Future visit: 22  
Last 2 Encounter Wt Readings:  
Date: Wt:  
2022 115.7 kg (255 lb)  
2021 126.6 kg (279 lb)  
Previous labs/tests for medication:  
Cholesterol:  
HDL Cholesterol (mg/dL)  
Date Value  
2022 25  
  
LDL Cholesterol (mg/dL)  
Date Value  
2022 68  
  
ALT (U/L)  
Date Value  
2021 18  
2021 19  
  
Non HDL Cholesterol (mg/dL)  
Date Value  
2022 98  
  
Blood Pressure:  
BUN (mg/dL)  
Date Value  
2022 28  
  
Sodium (mmol/L)  
Date Value  
2022 139  
Last 1 Encounter BP Readings:  
Date: BP:  
2022 128/76  
Blood Counts:  
WBC (k/uL)  
Date Value  
2022 5.31  
  
RBC (m/uL)  
Date Value  
2022 3.72  
  
Hematocrit (%)  
Date Value  
2022 40.3  
  
Hemoglobin (g/dL)  
Date Value  
2022 12.9  
  
Platelet Count (k/uL)  
Date Value  
2022 69  
  
Liver Function:  
ALT (U/L)  
Date Value  
2021 18  
2021 19  
  
AST (U/L)  
Date Value  
2021 30  
2021 31  
  
Please advise. Thank you. Nathalia Peterson, RN  
  
  
  
  
  
Electronically signed by Nathalia Peterson RN at 2022 3:09 PM EDT  
  
  
[facility]* 
  Telephone Encounter - Krista Perez APRN.CNP - 2022 1:25 PM EDT  
  
Formatting of this note might be different from the original.  
Shorty Perez APRN.CNP  
  
  
  
Electronically signed by Krista Perez APRN.CNP at 2022 1:25 PM EDT  
  
  
* Telephone Encounter - Mel Beach LPN - 2022 12:35 PM EDT  
  
Formatting of this note might be different from the original.  
Manual Readin/76 Pulse: 62  
  
Reason for blood pressure check - Last BP elevated  
  
Patient is:  
Taking medication as prescribed Yes  
Took medication today Yes If no, date medication last taken N/A  
Experiencing side effects No  
  
BP was elevated at last appt 22. No BP medication changes were made at that
 time. Taking all medications as prescribed. Denies any chest pain, unusual 
shortness of breath, dizziness, or headaches. No caffeine use. No personal 
history of tobacco use; no current exposure. Alert and oriented.  
  
Pt has been identified by name and birthdate: Yes  
  
Allergies reviewed: Yes  
Latex allergy: no.  
  
Medication - prescribed and OTC reviewed and updated: Yes  
Do you need any prescription refills prior to your next visit: No  
  
Health Maintenance: Reviewed and not up to date and provider notified  
  
Patient advised to continue with current medications and would be contacted if 
any further instructions after review by PCP.  
  
Mel Beach LPN  
  
  
  
  
  
Electronically signed by Mel Beach LPN at 2022 12:35 PM EDT  
  
  
documented in this [facility]Fire[facility]- History of Present 
illness Narrative* Mel Beach LPN - 2022 12:21 PM EDT  
  
Formatting of this note might be different from the original.  
Manual Readin/76 Pulse: 62  
  
Reason for blood pressure check - Last BP elevated  
  
Patient is:  
Taking medication as prescribed Yes  
Took medication today Yes If no, date medication last taken N/A  
Experiencing side effects No  
  
BP was elevated at last appt 22. No BP medication changes were made at that
 time. Taking all medications as prescribed. Denies any chest pain, unusual 
shortness of breath, dizziness, or headaches. No caffeine use. No personal 
history of tobacco use; no current exposure. Alert and oriented.  
  
Pt has been identified by name and birthdate: Yes  
  
Allergies reviewed: Yes  
Latex allergy: no.  
  
Medication - prescribed and OTC reviewed and updated: Yes  
Do you need any prescription refills prior to your next visit: No  
  
Health Maintenance: Reviewed and not up to date and provider notified  
  
Patient advised to continue with current medications and would be contacted if 
any further instructions after review by PCP.  
  
Mel Beach LPN  
  
  
  
  
Electronically signed by Mel Beach LPN at 2022 12:36 PM EDT  
  
  
documented in this encounterFire[facility]- History of Past illness 
Narrative*   
  
                          Problem      Noted Date   Diagnosed Date Resolved Date  
   
                          Obesity, Class II, BMI 35-39.9 2021  
   
                          Medicare annual wellness visit, subsequent 2019  
   
                                                    Encounter for long-term (cur  
rent) use of   
medications         2018  
   
                                                      
  
  
Overview:Formatting of this note might be different from the original.  
Added automatically from request for surgery [id number]  
   
   
                          History of colonic polyps 2018  
   
                                                      
  
  
Overview:Formatting of this note might be different from the original.  
Added automatically from request for surgery [id number]  
   
   
                          Gastroesophageal reflux disease 2018  
   
                                                      
  
  
Overview:Formatting of this note might be different from the original.  
Added automatically from request for surgery [id number]  
   
   
                          Acute pulmonary edema 10/05/2011                10/06/  
2011  
   
                                                      
  
  
Overview:Formatting of this note might be different from the original.  
10/5/2011  
cardiogenic and noncardiogenic factors  
  
   
   
                          Atelectasis  10/05/2011                10/06/2011  
   
                          Hypotension  10/04/2011                10/06/2011  
   
                                                      
  
  
Overview:Formatting of this note might be different from the original.  
10/4/2011  
goal map 65-80  
   
   
                          Stress hyperglycemia 10/04/2011                10/06/2  
011  
   
                                                      
  
  
Overview:Formatting of this note might be different from the original.  
10/4/2011  
Perioperative insulin resistance and exacerbation of hyperglycemia.  
Control blood glucose with titration of insulin infusion  
  
10/5/2011  
adequate control  
goal FBG<180  
  
   
   
                          Post-op pain 10/04/2011                10/06/2011  
   
                          Mechanically assisted ventilation 10/04/2011            
      10/05/2011  
   
                                                      
  
  
Overview:Formatting of this note might be different from the original.  
10/4/2011  
optimize MV settings, WTE  
current setting:FiO2, 75%, peep 8  
   
   
                          Cardiac insufficiency 10/04/2011                10/05/  
2011  
   
                                                      
  
  
Overview:Formatting of this note might be different from the original.  
10/4/2011  
RV mild to moderate post pump  
goal CI>2.3  
   
   
                          Hypoxemia    10/04/2011                10/06/2011  
   
                          discharge planning 2011                10/19/201  
1  
   
                                                      
  
  
Overview:Formatting of this note might be different from the original.  
age 71,  lives in [place], [place]. No DC needs.  
/  
   
   
                          Pre-op testing 2011                10/04/2011  
   
                                                      
  
  
Overview:Formatting of this note is different from the original.  
Images from the original note were not included.  
[facility]  
PRE-OP CHECKLIST  
  
Surgeon: Deangelo Angulo M.D. Informed Consent Completed: No  
STS Score: 1.4%  
CAD: Yes - CAD on Problem List: Yes  
Is intended procedure a CABG: Yes - is a beta blocker ordered? Yes  
  
H & P completed: Yes  
  
PA/LAT: Completed CT: Completed MRI: N/A LE US: N/A  
  
Cath: Yes - reviewed: Yes Echo:Completed EKG: Completed EF %: 61  
  
PI's: N/A Carotid: Completed Mapping: N/A Dental: Completed PFT's: N/A  
  
Basename 11 0729  
WBC 7.18  
HB 13.1  
HCT 41.8  
  
INR 1.0  
CREAT 1.35  
  
UA: Normal  
HCG:N/A  
  
ABO/ABO Confirmed: Yes  
  
Blood ordered: No  
  
SA Swab: Yes - results: Pending  
  
Last Dose of Anticoagulation: vitamins  
  
Op Note: N/A  
  
Pacemaker Check: N/A  
  
Consults: GI 2001  
  
DM: No  
  
Cardiac Surgical prep: No  
  
SIGNATURE: Krystal Castellanos RN CHECKED BY:  
DATE of SERVICE: 2011  
TIME of SERVICE: 2:23 PM  
  
  
   
   
                          Anemia of chronic disease 2011  
   
                          Intestinal polyp 2011  
   
                                                      
  
  
Overview:Formatting of this note might be different from the original.  
Distal ileum ulcerated polyp.  
   
   
                                                    Nonspecific abnormal results  
 of liver   
function study      2006  
   
                          Impotence of organic origin 2006  
   
                                                    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  
9.9 (morbid   
obesity)                                                    2021  
  
documented as of this encounter (statuses as of 2023)  
[facility]- [facility] of Past illness Narrative*   
  
                                Problem         Noted Date      Resolved Date  
   
                                Medicare annual wellness visit, subsequent 2019  
   
                                Encounter for long-term (current) use of medicat  
ions 2018  
   
                                                      
  
  
Overview:  
  
  
Formatting of this note might be different from the original.  
Added automatically from request for surgery [id number]   
   
                                History of colonic polyps 2018  
   
                                                      
  
  
Overview:  
  
  
Formatting of this note might be different from the original.  
Added automatically from request for surgery [id number]   
   
                                Gastroesophageal reflux disease 2018  
   
                                                      
  
  
Overview:  
  
  
Formatting of this note might be different from the original.  
Added automatically from request for surgery [id number]   
   
                                Acute pulmonary edema 10/05/2011      10/06/2011  
   
                                                      
  
  
Overview:  
  
  
Formatting of this note might be different from the original.  
10/5/2011  
cardiogenic and noncardiogenic factors  
   
   
                                Atelectasis     10/05/2011      10/06/2011  
   
                                Hypotension     10/04/2011      10/06/2011  
   
                                                      
  
  
Overview:  
  
  
Formatting of this note might be different from the original.  
10/4/2011  
goal map 65-80   
   
                                Stress hyperglycemia 10/04/2011      10/06/2011  
   
                                                      
  
  
Overview:  
  
  
Formatting of this note might be different from the original.  
10/4/2011  
Perioperative insulin resistance and exacerbation of hyperglycemia.  
Control blood glucose with titration of insulin infusion  
  
10/5/2011  
adequate control  
goal FBG<180  
   
   
                                Post-op pain    10/04/2011      10/06/2011  
   
                                Mechanically assisted ventilation 10/04/2011      
  10/05/2011  
   
                                                      
  
  
Overview:  
  
  
Formatting of this note might be different from the original.  
10/4/2011  
optimize MV settings, WTE  
current setting:FiO2, 75%, peep 8   
   
                                Cardiac insufficiency 10/04/2011      10/05/2011  
   
                                                      
  
  
Overview:  
  
  
Formatting of this note might be different from the original.  
10/4/2011  
RV mild to moderate post pump  
goal CI>2.3   
   
                                Hypoxemia       10/04/2011      10/06/2011  
   
                                discharge planning 2011      10/19/2011  
   
                                                      
  
  
Overview:  
  
  
Formatting of this note might be different from the original.  
age 71,  lives in [place], [place]. No DC needs.  
/   
   
                                Pre-op testing  2011      10/04/2011  
   
                                                      
  
  
Overview:  
  
  
Formatting of this note is different from the original.  
Images from the original note were not included.  
[facility]  
PRE-OP CHECKLIST  
  
Surgeon: Deangelo Angulo M.D. Informed Consent Completed: No  
STS Score: 1.4%  
CAD: Yes - CAD on Problem List: Yes  
Is intended procedure a CABG: Yes - is a beta blocker ordered? Yes  
  
H & P completed: Yes  
  
PA/LAT: Completed CT: Completed MRI: N/A LE US: N/A  
  
Cath: Yes - reviewed: Yes Echo:Completed EKG: Completed EF %: 61  
  
PI's: N/A Carotid: Completed Mapping: N/A Dental: Completed PFT's: N/A  
  
Basename 11 0729  
WBC 7.18  
HB 13.1  
HCT 41.8  
  
INR 1.0  
CREAT 1.35  
  
UA: Normal  
HCG:N/A  
  
ABO/ABO Confirmed: Yes  
  
Blood ordered: No  
  
SA Swab: Yes - results: Pending  
  
Last Dose of Anticoagulation: vitamins  
  
Op Note: N/A  
  
Pacemaker Check: N/A  
  
Consults: GI 2001  
  
DM: No  
  
Cardiac Surgical prep: No  
  
SIGNATURE: Krystal Castellanos RN CHECKED BY:  
DATE of SERVICE: 2011  
TIME of SERVICE: 2:23 PM  
  
   
   
                                Anemia of chronic disease 2011  
   
                                Intestinal polyp 2011  
   
                                                      
  
  
Overview:  
  
  
Formatting of this note might be different from the original.  
Distal ileum ulcerated polyp.   
   
                                Nonspecific abnormal results of liver function s  
tudy 2006  
   
                                Impotence of organic origin 2006  
   
                                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  
                 2021  
  
documented as of this encounter (statuses as of 2022)  
[facility]arrative*   
  
                                Problem         Noted Date      Resolved Date  
   
                                Medicare annual wellness visit, subsequent 2019  
   
                                Encounter for long-term (current) use of medicat  
ions 2018  
   
                                                      
  
  
Overview:  
  
  
Formatting of this note might be different from the original.  
Added automatically from request for surgery [id number]   
   
                                History of colonic polyps 2018  
   
                                                      
  
  
Overview:  
  
  
Formatting of this note might be different from the original.  
Added automatically from request for surgery [id number]   
   
                                Gastroesophageal reflux disease 2018  
   
                                                      
  
  
Overview:  
  
  
Formatting of this note might be different from the original.  
Added automatically from request for surgery [id number]   
   
                                Acute pulmonary edema 10/05/2011      10/06/2011  
   
                                                      
  
  
Overview:  
  
  
Formatting of this note might be different from the original.  
10/5/2011  
cardiogenic and noncardiogenic factors  
   
   
                                Atelectasis     10/05/2011      10/06/2011  
   
                                Hypotension     10/04/2011      10/06/2011  
   
                                                      
  
  
Overview:  
  
  
Formatting of this note might be different from the original.  
10/4/2011  
goal map 65-80   
   
                                Stress hyperglycemia 10/04/2011      10/06/2011  
   
                                                      
  
  
Overview:  
  
  
Formatting of this note might be different from the original.  
10/4/2011  
Perioperative insulin resistance and exacerbation of hyperglycemia.  
Control blood glucose with titration of insulin infusion  
  
10/5/2011  
adequate control  
goal FBG<180  
   
   
                                Post-op pain    10/04/2011      10/06/2011  
   
                                Mechanically assisted ventilation 10/04/2011      
  10/05/2011  
   
                                                      
  
  
Overview:  
  
  
Formatting of this note might be different from the original.  
10/4/2011  
optimize MV settings, WTE  
current setting:FiO2, 75%, peep 8   
   
                                Cardiac insufficiency 10/04/2011      10/05/2011  
   
                                                      
  
  
Overview:  
  
  
Formatting of this note might be different from the original.  
10/4/2011  
RV mild to moderate post pump  
goal CI>2.3   
   
                                Hypoxemia       10/04/2011      10/06/2011  
   
                                discharge planning 2011      10/19/2011  
   
                                                      
  
  
Overview:  
  
  
Formatting of this note might be different from the original.  
age 71,  lives in [place], [place]. No DC needs.  
/   
   
                                Pre-op testing  2011      10/04/2011  
   
                                                      
  
  
Overview:  
  
  
Formatting of this note is different from the original.  
Images from the original note were not included.  
[facility]  
PRE-OP CHECKLIST  
  
Surgeon: Deangelo Angulo M.D. Informed Consent Completed: No  
STS Score: 1.4%  
CAD: Yes - CAD on Problem List: Yes  
Is intended procedure a CABG: Yes - is a beta blocker ordered? Yes  
  
H & P completed: Yes  
  
PA/LAT: Completed CT: Completed MRI: N/A LE US: N/A  
  
Cath: Yes - reviewed: Yes Echo:Completed EKG: Completed EF %: 61  
  
PI's: N/A Carotid: Completed Mapping: N/A Dental: Completed PFT's: N/A  
  
Basename 11 0729  
WBC 7.18  
HB 13.1  
HCT 41.8  
  
INR 1.0  
CREAT 1.35  
  
UA: Normal  
HCG:N/A  
  
ABO/ABO Confirmed: Yes  
  
Blood ordered: No  
  
SA Swab: Yes - results: Pending  
  
Last Dose of Anticoagulation: vitamins  
  
Op Note: N/A  
  
Pacemaker Check: N/A  
  
Consults: GI 2001  
  
DM: No  
  
Cardiac Surgical prep: No  
  
SIGNATURE: Krystal Castellanos RN CHECKED BY:  
DATE of SERVICE: 2011  
TIME of SERVICE: 2:23 PM  
  
   
   
                                Anemia of chronic disease 2011  
   
                                Intestinal polyp 2011  
   
                                                      
  
  
Overview:  
  
  
Formatting of this note might be different from the original.  
Distal ileum ulcerated polyp.   
   
                                Nonspecific abnormal results of liver function s  
tudy 2006  
   
                                Impotence of organic origin 2006  
   
                                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  
                 2021  
  
documented as of this encounter (statuses as of 2022)  
[facility]arrative*   
  
                                Problem         Noted Date      Resolved Date  
   
                                Medicare annual wellness visit, subsequent 2019  
   
                                Encounter for long-term (current) use of medicat  
ions 2018  
   
                                                      
  
  
Overview:  
  
  
Formatting of this note might be different from the original.  
Added automatically from request for surgery [id number]   
   
                                History of colonic polyps 2018  
   
                                                      
  
  
Overview:  
  
  
Formatting of this note might be different from the original.  
Added automatically from request for surgery [id number]   
   
                                Gastroesophageal reflux disease 2018  
   
                                                      
  
  
Overview:  
  
  
Formatting of this note might be different from the original.  
Added automatically from request for surgery [id number]   
   
                                Acute pulmonary edema 10/05/2011      10/06/2011  
   
                                                      
  
  
Overview:  
  
  
Formatting of this note might be different from the original.  
10/5/2011  
cardiogenic and noncardiogenic factors  
   
   
                                Atelectasis     10/05/2011      10/06/2011  
   
                                Hypotension     10/04/2011      10/06/2011  
   
                                                      
  
  
Overview:  
  
  
Formatting of this note might be different from the original.  
10/4/2011  
goal map 65-80   
   
                                Stress hyperglycemia 10/04/2011      10/06/2011  
   
                                                      
  
  
Overview:  
  
  
Formatting of this note might be different from the original.  
10/4/2011  
Perioperative insulin resistance and exacerbation of hyperglycemia.  
Control blood glucose with titration of insulin infusion  
  
10/5/2011  
adequate control  
goal FBG<180  
   
   
                                Post-op pain    10/04/2011      10/06/2011  
   
                                Mechanically assisted ventilation 10/04/2011      
  10/05/2011  
   
                                                      
  
  
Overview:  
  
  
Formatting of this note might be different from the original.  
10/4/2011  
optimize MV settings, WTE  
current setting:FiO2, 75%, peep 8   
   
                                Cardiac insufficiency 10/04/2011      10/05/2011  
   
                                                      
  
  
Overview:  
  
  
Formatting of this note might be different from the original.  
10/4/2011  
RV mild to moderate post pump  
goal CI>2.3   
   
                                Hypoxemia       10/04/2011      10/06/2011  
   
                                discharge planning 2011      10/19/2011  
   
                                                      
  
  
Overview:  
  
  
Formatting of this note might be different from the original.  
age 71,  lives in [place], [place]. No DC needs.  
/   
   
                                Pre-op testing  2011      10/04/2011  
   
                                                      
  
  
Overview:  
  
  
Formatting of this note is different from the original.  
Images from the original note were not included.  
[facility]  
PRE-OP CHECKLIST  
  
Surgeon: Deangelo Angulo M.D. Informed Consent Completed: No  
STS Score: 1.4%  
CAD: Yes - CAD on Problem List: Yes  
Is intended procedure a CABG: Yes - is a beta blocker ordered? Yes  
  
H & P completed: Yes  
  
PA/LAT: Completed CT: Completed MRI: N/A LE US: N/A  
  
Cath: Yes - reviewed: Yes Echo:Completed EKG: Completed EF %: 61  
  
PI's: N/A Carotid: Completed Mapping: N/A Dental: Completed PFT's: N/A  
  
Basename 11 0729  
WBC 7.18  
HB 13.1  
HCT 41.8  
  
INR 1.0  
CREAT 1.35  
  
UA: Normal  
HCG:N/A  
  
ABO/ABO Confirmed: Yes  
  
Blood ordered: No  
  
SA Swab: Yes - results: Pending  
  
Last Dose of Anticoagulation: vitamins  
  
Op Note: N/A  
  
Pacemaker Check: N/A  
  
Consults: GI 2001  
  
DM: No  
  
Cardiac Surgical prep: No  
  
SIGNATURE: Krystal Castellanos RN CHECKED BY:  
DATE of SERVICE: 2011  
TIME of SERVICE: 2:23 PM  
  
   
   
                                Anemia of chronic disease 2011  
   
                                Intestinal polyp 2011  
   
                                                      
  
  
Overview:  
  
  
Formatting of this note might be different from the original.  
Distal ileum ulcerated polyp.   
   
                                Nonspecific abnormal results of liver function s  
pepe 2006  
   
                                Impotence of organic origin 2006      09/0  
2015  
   
                                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  
                 2021  
  
documented as of this encounter (statuses as of 2022)  
[facility]arr[facility]*   
  
                                Problem         Noted Date      Resolved Date  
   
                                Medicare annual wellness visit, subsequent 2019  
   
                                Encounter for long-term (current) use of medicat  
ions 2018  
   
                                                      
  
  
Overview:  
  
  
Formatting of this note might be different from the original.  
Added automatically from request for surgery [id number]   
   
                                History of colonic polyps 2018  
   
                                                      
  
  
Overview:  
  
  
Formatting of this note might be different from the original.  
Added automatically from request for surgery [id number]   
   
                                Gastroesophageal reflux disease 2018  
   
                                                      
  
  
Overview:  
  
  
Formatting of this note might be different from the original.  
Added automatically from request for surgery [id number]   
   
                                Acute pulmonary edema 10/05/2011      10/06/2011  
   
                                                      
  
  
Overview:  
  
  
Formatting of this note might be different from the original.  
10/5/2011  
cardiogenic and noncardiogenic factors  
   
   
                                Atelectasis     10/05/2011      10/06/2011  
   
                                Hypotension     10/04/2011      10/06/2011  
   
                                                      
  
  
Overview:  
  
  
Formatting of this note might be different from the original.  
10/4/2011  
goal map 65-80   
   
                                Stress hyperglycemia 10/04/2011      10/06/2011  
   
                                                      
  
  
Overview:  
  
  
Formatting of this note might be different from the original.  
10/4/2011  
Perioperative insulin resistance and exacerbation of hyperglycemia.  
Control blood glucose with titration of insulin infusion  
  
10/5/2011  
adequate control  
goal FBG<180  
   
   
                                Post-op pain    10/04/2011      10/06/2011  
   
                                Mechanically assisted ventilation 10/04/2011      
  10/05/2011  
   
                                                      
  
  
Overview:  
  
  
Formatting of this note might be different from the original.  
10/4/2011  
optimize MV settings, WTE  
current setting:FiO2, 75%, peep 8   
   
                                Cardiac insufficiency 10/04/2011      10/05/2011  
   
                                                      
  
  
Overview:  
  
  
Formatting of this note might be different from the original.  
10/4/2011  
RV mild to moderate post pump  
goal CI>2.3   
   
                                Hypoxemia       10/04/2011      10/06/2011  
   
                                discharge planning 2011      10/19/2011  
   
                                                      
  
  
Overview:  
  
  
Formatting of this note might be different from the original.  
age 71,  lives in [place], [place]. No DC needs.  
/   
   
                                Pre-op testing  2011      10/04/2011  
   
                                                      
  
  
Overview:  
  
  
Formatting of this note is different from the original.  
Images from the original note were not included.  
[facility]  
PRE-OP CHECKLIST  
  
Surgeon: Deangelo Angulo M.D. Informed Consent Completed: No  
STS Score: 1.4%  
CAD: Yes - CAD on Problem List: Yes  
Is intended procedure a CABG: Yes - is a beta blocker ordered? Yes  
  
H & P completed: Yes  
  
PA/LAT: Completed CT: Completed MRI: N/A LE US: N/A  
  
Cath: Yes - reviewed: Yes Echo:Completed EKG: Completed EF %: 61  
  
PI's: N/A Carotid: Completed Mapping: N/A Dental: Completed PFT's: N/A  
  
Basename 11 0729  
WBC 7.18  
HB 13.1  
HCT 41.8  
  
INR 1.0  
CREAT 1.35  
  
UA: Normal  
HCG:N/A  
  
ABO/ABO Confirmed: Yes  
  
Blood ordered: No  
  
SA Swab: Yes - results: Pending  
  
Last Dose of Anticoagulation: vitamins  
  
Op Note: N/A  
  
Pacemaker Check: N/A  
  
Consults: GI 2001  
  
DM: No  
  
Cardiac Surgical prep: No  
  
SIGNATURE: Krsytal Castellanos RN CHECKED BY:  
DATE of SERVICE: 2011  
TIME of SERVICE: 2:23 PM  
  
   
   
                                Anemia of chronic disease 2011  
   
                                Intestinal polyp 2011  
   
                                                      
  
  
Overview:  
  
  
Formatting of this note might be different from the original.  
Distal ileum ulcerated polyp.   
   
                                Nonspecific abnormal results of liver function s  
tudy 2006  
   
                                Impotence of organic origin 2006  
   
                                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  
                 2021  
  
documented as of this encounter (statuses as of 2022)  
[facility]06- History of Past illness Narrative*   
  
                                Problem         Noted Date      Resolved Date  
   
                                Medicare annual wellness visit, subsequent 2019  
   
                                Encounter for long-term (current) use of medicat  
ions 2018  
   
                                                      
  
  
Overview:  
  
  
Formatting of this note might be different from the original.  
Added automatically from request for surgery [id number]   
   
                                History of colonic polyps 2018  
   
                                                      
  
  
Overview:  
  
  
Formatting of this note might be different from the original.  
Added automatically from request for surgery [id number]   
   
                                Gastroesophageal reflux disease 2018  
   
                                                      
  
  
Overview:  
  
  
Formatting of this note might be different from the original.  
Added automatically from request for surgery [id number]   
   
                                Acute pulmonary edema 10/05/2011      10/06/2011  
   
                                                      
  
  
Overview:  
  
  
Formatting of this note might be different from the original.  
10/5/2011  
cardiogenic and noncardiogenic factors  
   
   
                                Atelectasis     10/05/2011      10/06/2011  
   
                                Hypotension     10/04/2011      10/06/2011  
   
                                                      
  
  
Overview:  
  
  
Formatting of this note might be different from the original.  
10/4/2011  
goal map 65-80   
   
                                Stress hyperglycemia 10/04/2011      10/06/2011  
   
                                                      
  
  
Overview:  
  
  
Formatting of this note might be different from the original.  
10/4/2011  
Perioperative insulin resistance and exacerbation of hyperglycemia.  
Control blood glucose with titration of insulin infusion  
  
10/5/2011  
adequate control  
goal FBG<180  
   
   
                                Post-op pain    10/04/2011      10/06/2011  
   
                                Mechanically assisted ventilation 10/04/2011      
  10/05/2011  
   
                                                      
  
  
Overview:  
  
  
Formatting of this note might be different from the original.  
10/4/2011  
optimize MV settings, WTE  
current setting:FiO2, 75%, peep 8   
   
                                Cardiac insufficiency 10/04/2011      10/05/2011  
   
                                                      
  
  
Overview:  
  
  
Formatting of this note might be different from the original.  
10/4/2011  
RV mild to moderate post pump  
goal CI>2.3   
   
                                Hypoxemia       10/04/2011      10/06/2011  
   
                                discharge planning 2011      10/19/2011  
   
                                                      
  
  
Overview:  
  
  
Formatting of this note might be different from the original.  
age 71,  lives in [place], [place]. No DC needs.  
/   
   
                                Pre-op testing  2011      10/04/2011  
   
                                                      
  
  
Overview:  
  
  
Formatting of this note is different from the original.  
Images from the original note were not included.  
[facility]  
PRE-OP CHECKLIST  
  
Surgeon: Deangelo Angulo M.D. Informed Consent Completed: No  
STS Score: 1.4%  
CAD: Yes - CAD on Problem List: Yes  
Is intended procedure a CABG: Yes - is a beta blocker ordered? Yes  
  
H & P completed: Yes  
  
PA/LAT: Completed CT: Completed MRI: N/A LE US: N/A  
  
Cath: Yes - reviewed: Yes Echo:Completed EKG: Completed EF %: 61  
  
PI's: N/A Carotid: Completed Mapping: N/A Dental: Completed PFT's: N/A  
  
Basename 11 0729  
WBC 7.18  
HB 13.1  
HCT 41.8  
  
INR 1.0  
CREAT 1.35  
  
UA: Normal  
HCG:N/A  
  
ABO/ABO Confirmed: Yes  
  
Blood ordered: No  
  
SA Swab: Yes - results: Pending  
  
Last Dose of Anticoagulation: vitamins  
  
Op Note: N/A  
  
Pacemaker Check: N/A  
  
Consults: GI 2001  
  
DM: No  
  
Cardiac Surgical prep: No  
  
SIGNATURE: Krystal Castellanos RN CHECKED BY:  
DATE of SERVICE: 2011  
TIME of SERVICE: 2:23 PM  
  
   
   
                                Anemia of chronic disease 2011  
   
                                Intestinal polyp 2011  
   
                                                      
  
  
Overview:  
  
  
Formatting of this note might be different from the original.  
Distal ileum ulcerated polyp.   
   
                                Nonspecific abnormal results of liver function s  
nialldy 2006  
   
                                Impotence of organic origin 2006  
   
                                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  
                 2021  
  
documented as of this encounter (statuses as of 2022)  
[facility]06- History of Past illness Narrative*   
  
                                Problem         Noted Date      Resolved Date  
   
                                Medicare annual wellness visit, subsequent 2019  
   
                                Encounter for long-term (current) use of medicat  
ions 2018  
   
                                                      
  
  
Overview:  
  
  
Formatting of this note might be different from the original.  
Added automatically from request for surgery [id number]   
   
                                History of colonic polyps 2018  
   
                                                      
  
  
Overview:  
  
  
Formatting of this note might be different from the original.  
Added automatically from request for surgery [id number]   
   
                                Gastroesophageal reflux disease 2018  
   
                                                      
  
  
Overview:  
  
  
Formatting of this note might be different from the original.  
Added automatically from request for surgery [id number]   
   
                                Acute pulmonary edema 10/05/2011      10/06/2011  
   
                                                      
  
  
Overview:  
  
  
Formatting of this note might be different from the original.  
10/5/2011  
cardiogenic and noncardiogenic factors  
   
   
                                Atelectasis     10/05/2011      10/06/2011  
   
                                Hypotension     10/04/2011      10/06/2011  
   
                                                      
  
  
Overview:  
  
  
Formatting of this note might be different from the original.  
10/4/2011  
goal map 65-80   
   
                                Stress hyperglycemia 10/04/2011      10/06/2011  
   
                                                      
  
  
Overview:  
  
  
Formatting of this note might be different from the original.  
10/4/2011  
Perioperative insulin resistance and exacerbation of hyperglycemia.  
Control blood glucose with titration of insulin infusion  
  
10/5/2011  
adequate control  
goal FBG<180  
   
   
                                Post-op pain    10/04/2011      10/06/2011  
   
                                Mechanically assisted ventilation 10/04/2011      
  10/05/2011  
   
                                                      
  
  
Overview:  
  
  
Formatting of this note might be different from the original.  
10/4/2011  
optimize MV settings, WTE  
current setting:FiO2, 75%, peep 8   
   
                                Cardiac insufficiency 10/04/2011      10/05/2011  
   
                                                      
  
  
Overview:  
  
  
Formatting of this note might be different from the original.  
10/4/2011  
RV mild to moderate post pump  
goal CI>2.3   
   
                                Hypoxemia       10/04/2011      10/06/2011  
   
                                discharge planning 2011      10/19/2011  
   
                                                      
  
  
Overview:  
  
  
Formatting of this note might be different from the original.  
age 71,  lives in [place], [place]. No DC needs.  
/   
   
                                Pre-op testing  2011      10/04/2011  
   
                                                      
  
  
Overview:  
  
  
Formatting of this note is different from the original.  
Images from the original note were not included.  
[facility]  
PRE-OP CHECKLIST  
  
Surgeon: Deangelo Angulo M.D. Informed Consent Completed: No  
STS Score: 1.4%  
CAD: Yes - CAD on Problem List: Yes  
Is intended procedure a CABG: Yes - is a beta blocker ordered? Yes  
  
H & P completed: Yes  
  
PA/LAT: Completed CT: Completed MRI: N/A LE US: N/A  
  
Cath: Yes - reviewed: Yes Echo:Completed EKG: Completed EF %: 61  
  
PI's: N/A Carotid: Completed Mapping: N/A Dental: Completed PFT's: N/A  
  
Basename 11 0729  
WBC 7.18  
HB 13.1  
HCT 41.8  
  
INR 1.0  
CREAT 1.35  
  
UA: Normal  
HCG:N/A  
  
ABO/ABO Confirmed: Yes  
  
Blood ordered: No  
  
SA Swab: Yes - results: Pending  
  
Last Dose of Anticoagulation: vitamins  
  
Op Note: N/A  
  
Pacemaker Check: N/A  
  
Consults: GI 2001  
  
DM: No  
  
Cardiac Surgical prep: No  
  
SIGNATURE: Krystal Castellanos RN CHECKED BY:  
DATE of SERVICE: 2011  
TIME of SERVICE: 2:23 PM  
  
   
   
                                Anemia of chronic disease 2011  
   
                                Intestinal polyp 2011  
   
                                                      
  
  
Overview:  
  
  
Formatting of this note might be different from the original.  
Distal ileum ulcerated polyp.   
   
                                Nonspecific abnormal results of liver function s  
tudy 2006  
   
                                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  
                 2021  
  
documented as of this encounter (statuses as of 2022)  
[facility]06- History of Past illness Narrative*   
  
                                Problem         Noted Date      Resolved Date  
   
                                Medicare annual wellness visit, subsequent 2019  
   
                                Encounter for long-term (current) use of medicat  
ions 2018  
   
                                                      
  
  
Overview:Formatting of this note might be different from the original.  
Added automatically from request for surgery [id number]  
   
   
                                History of colonic polyps 2018  
   
                                                      
  
  
Overview:Formatting of this note might be different from the original.  
Added automatically from request for surgery [id number]  
   
   
                                Gastroesophageal reflux disease 2018  
   
                                                      
  
  
Overview:Formatting of this note might be different from the original.  
Added automatically from request for surgery [id number]  
   
   
                                Acute pulmonary edema 10/05/2011      10/06/2011  
   
                                                      
  
  
Overview:Formatting of this note might be different from the original.  
10/5/2011  
cardiogenic and noncardiogenic factors  
  
   
   
                                Atelectasis     10/05/2011      10/06/2011  
   
                                Hypotension     10/04/2011      10/06/2011  
   
                                                      
  
  
Overview:Formatting of this note might be different from the original.  
10/4/2011  
goal map 65-80  
   
   
                                Stress hyperglycemia 10/04/2011      10/06/2011  
   
                                                      
  
  
Overview:Formatting of this note might be different from the original.  
10/4/2011  
Perioperative insulin resistance and exacerbation of hyperglycemia.  
Control blood glucose with titration of insulin infusion  
  
10/5/2011  
adequate control  
goal FBG<180  
  
   
   
                                Post-op pain    10/04/2011      10/06/2011  
   
                                Mechanically assisted ventilation 10/04/2011      
  10/05/2011  
   
                                                      
  
  
Overview:Formatting of this note might be different from the original.  
10/4/2011  
optimize MV settings, WTE  
current setting:FiO2, 75%, peep 8  
   
   
                                Cardiac insufficiency 10/04/2011      10/05/2011  
   
                                                      
  
  
Overview:Formatting of this note might be different from the original.  
10/4/2011  
RV mild to moderate post pump  
goal CI>2.3  
   
   
                                Hypoxemia       10/04/2011      10/06/2011  
   
                                discharge planning 2011      10/19/2011  
   
                                                      
  
  
Overview:Formatting of this note might be different from the original.  
age 71,  lives in [place], [place]. No DC needs.  
/  
   
   
                                Pre-op testing  2011      10/04/2011  
   
                                                      
  
  
Overview:Formatting of this note is different from the original.  
Images from the original note were not included.  
[facility]  
PRE-OP CHECKLIST  
  
Surgeon: Deangelo Angulo M.D. Informed Consent Completed: No  
STS Score: 1.4%  
CAD: Yes - CAD on Problem List: Yes  
Is intended procedure a CABG: Yes - is a beta blocker ordered? Yes  
  
H & P completed: Yes  
  
PA/LAT: Completed CT: Completed MRI: N/A LE US: N/A  
  
Cath: Yes - reviewed: Yes Echo:Completed EKG: Completed EF %: 61  
  
PI's: N/A Carotid: Completed Mapping: N/A Dental: Completed PFT's: N/A  
  
Basename 11 0729  
WBC 7.18  
HB 13.1  
HCT 41.8  
  
INR 1.0  
CREAT 1.35  
  
UA: Normal  
HCG:N/A  
  
ABO/ABO Confirmed: Yes  
  
Blood ordered: No  
  
SA Swab: Yes - results: Pending  
  
Last Dose of Anticoagulation: vitamins  
  
Op Note: N/A  
  
Pacemaker Check: N/A  
  
Consults: GI 2001  
  
DM: No  
  
Cardiac Surgical prep: No  
  
SIGNATURE: Krystal Castellanos RN CHECKED BY:  
DATE of SERVICE: 2011  
TIME of SERVICE: 2:23 PM  
  
  
   
   
                                Anemia of chronic disease 2011  
   
                                Intestinal polyp 2011  
   
                                                      
  
  
Overview:Formatting of this note might be different from the original.  
Distal ileum ulcerated polyp.  
   
   
                                Nonspecific abnormal results of liver function s  
pepe 2006  
   
                                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  
                 2021  
  
documented as of this encounter (statuses as of 2022)  
[facility]06- History of Past illness Narrative*   
  
                                Problem         Noted Date      Resolved Date  
   
                                Medicare annual wellness visit, subsequent 2019  
   
                                Encounter for long-term (current) use of medicat  
ions 2018  
   
                                                      
  
  
Overview:Formatting of this note might be different from the original.  
Added automatically from request for surgery [id number]  
   
   
                                History of colonic polyps 2018  
   
                                                      
  
  
Overview:Formatting of this note might be different from the original.  
Added automatically from request for surgery [id number]  
   
   
                                Gastroesophageal reflux disease 2018  
   
                                                      
  
  
Overview:Formatting of this note might be different from the original.  
Added automatically from request for surgery [id number]  
   
   
                                Acute pulmonary edema 10/05/2011      10/06/2011  
   
                                                      
  
  
Overview:Formatting of this note might be different from the original.  
10/5/2011  
cardiogenic and noncardiogenic factors  
  
   
   
                                Atelectasis     10/05/2011      10/06/2011  
   
                                Hypotension     10/04/2011      10/06/2011  
   
                                                      
  
  
Overview:Formatting of this note might be different from the original.  
10/4/2011  
goal map 65-80  
   
   
                                Stress hyperglycemia 10/04/2011      10/06/2011  
   
                                                      
  
  
Overview:Formatting of this note might be different from the original.  
10/4/2011  
Perioperative insulin resistance and exacerbation of hyperglycemia.  
Control blood glucose with titration of insulin infusion  
  
10/5/2011  
adequate control  
goal FBG<180  
  
   
   
                                Post-op pain    10/04/2011      10/06/2011  
   
                                Mechanically assisted ventilation 10/04/2011      
  10/05/2011  
   
                                                      
  
  
Overview:Formatting of this note might be different from the original.  
10/4/2011  
optimize MV settings, WTE  
current setting:FiO2, 75%, peep 8  
   
   
                                Cardiac insufficiency 10/04/2011      10/05/2011  
   
                                                      
  
  
Overview:Formatting of this note might be different from the original.  
10/4/2011  
RV mild to moderate post pump  
goal CI>2.3  
   
   
                                Hypoxemia       10/04/2011      10/06/2011  
   
                                discharge planning 2011      10/19/2011  
   
                                                      
  
  
Overview:Formatting of this note might be different from the original.  
age 71,  lives in [place], [place]. No DC needs.  
/  
   
   
                                Pre-op testing  2011      10/04/2011  
   
                                                      
  
  
Overview:Formatting of this note is different from the original.  
Images from the original note were not included.  
[facility]  
PRE-OP CHECKLIST  
  
Surgeon: Deangelo Angulo M.D. Informed Consent Completed: No  
STS Score: 1.4%  
CAD: Yes - CAD on Problem List: Yes  
Is intended procedure a CABG: Yes - is a beta blocker ordered? Yes  
  
H & P completed: Yes  
  
PA/LAT: Completed CT: Completed MRI: N/A LE US: N/A  
  
Cath: Yes - reviewed: Yes Echo:Completed EKG: Completed EF %: 61  
  
PI's: N/A Carotid: Completed Mapping: N/A Dental: Completed PFT's: N/A  
  
Basename 11 0729  
WBC 7.18  
HB 13.1  
HCT 41.8  
  
INR 1.0  
CREAT 1.35  
  
UA: Normal  
HCG:N/A  
  
ABO/ABO Confirmed: Yes  
  
Blood ordered: No  
  
SA Swab: Yes - results: Pending  
  
Last Dose of Anticoagulation: vitamins  
  
Op Note: N/A  
  
Pacemaker Check: N/A  
  
Consults: GI 2001  
  
DM: No  
  
Cardiac Surgical prep: No  
  
SIGNATURE: Krystal Castellanos RN CHECKED BY:  
DATE of SERVICE: 2011  
TIME of SERVICE: 2:23 PM  
  
  
   
   
                                Anemia of chronic disease 2011  
   
                                Intestinal polyp 2011  
   
                                                      
  
  
Overview:Formatting of this note might be different from the original.  
Distal ileum ulcerated polyp.  
   
   
                                Nonspecific abnormal results of liver function s  
tudy 2006  
   
                                Impotence of organic origin 2006  
   
                                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  
                 2021  
  
documented as of this encounter (statuses as of 2022)  
[facility]06- History of Past illness Narrative*   
  
                                Problem         Noted Date      Resolved Date  
   
                                Medicare annual wellness visit, subsequent 2019  
   
                                Encounter for long-term (current) use of medicat  
ions 2018  
   
                                                      
  
  
Overview:Formatting of this note might be different from the original.  
Added automatically from request for surgery [id number]  
   
   
                                History of colonic polyps 2018  
   
                                                      
  
  
Overview:Formatting of this note might be different from the original.  
Added automatically from request for surgery [id number]  
   
   
                                Gastroesophageal reflux disease 2018  
   
                                                      
  
  
Overview:Formatting of this note might be different from the original.  
Added automatically from request for surgery [id number]  
   
   
                                Acute pulmonary edema 10/05/2011      10/06/2011  
   
                                                      
  
  
Overview:Formatting of this note might be different from the original.  
10/5/2011  
cardiogenic and noncardiogenic factors  
  
   
   
                                Atelectasis     10/05/2011      10/06/2011  
   
                                Hypotension     10/04/2011      10/06/2011  
   
                                                      
  
  
Overview:Formatting of this note might be different from the original.  
10/4/2011  
goal map 65-80  
   
   
                                Stress hyperglycemia 10/04/2011      10/06/2011  
   
                                                      
  
  
Overview:Formatting of this note might be different from the original.  
10/4/2011  
Perioperative insulin resistance and exacerbation of hyperglycemia.  
Control blood glucose with titration of insulin infusion  
  
10/5/2011  
adequate control  
goal FBG<180  
  
   
   
                                Post-op pain    10/04/2011      10/06/2011  
   
                                Mechanically assisted ventilation 10/04/2011      
  10/05/2011  
   
                                                      
  
  
Overview:Formatting of this note might be different from the original.  
10/4/2011  
optimize MV settings, WTE  
current setting:FiO2, 75%, peep 8  
   
   
                                Cardiac insufficiency 10/04/2011      10/05/2011  
   
                                                      
  
  
Overview:Formatting of this note might be different from the original.  
10/4/2011  
RV mild to moderate post pump  
goal CI>2.3  
   
   
                                Hypoxemia       10/04/2011      10/06/2011  
   
                                discharge planning 2011      10/19/2011  
   
                                                      
  
  
Overview:Formatting of this note might be different from the original.  
age 71,  lives in [place], [place]. No DC needs.  
/  
   
   
                                Pre-op testing  2011      10/04/2011  
   
                                                      
  
  
Overview:Formatting of this note is different from the original.  
Images from the original note were not included.  
[facility]  
PRE-OP CHECKLIST  
  
Surgeon: Deangelo Angulo M.D. Informed Consent Completed: No  
STS Score: 1.4%  
CAD: Yes - CAD on Problem List: Yes  
Is intended procedure a CABG: Yes - is a beta blocker ordered? Yes  
  
H & P completed: Yes  
  
PA/LAT: Completed CT: Completed MRI: N/A LE US: N/A  
  
Cath: Yes - reviewed: Yes Echo:Completed EKG: Completed EF %: 61  
  
PI's: N/A Carotid: Completed Mapping: N/A Dental: Completed PFT's: N/A  
  
Basename 11 0729  
WBC 7.18  
HB 13.1  
HCT 41.8  
  
INR 1.0  
CREAT 1.35  
  
UA: Normal  
HCG:N/A  
  
ABO/ABO Confirmed: Yes  
  
Blood ordered: No  
  
SA Swab: Yes - results: Pending  
  
Last Dose of Anticoagulation: vitamins  
  
Op Note: N/A  
  
Pacemaker Check: N/A  
  
Consults: GI 2001  
  
DM: No  
  
Cardiac Surgical prep: No  
  
SIGNATURE: Krystal Castellanos RN CHECKED BY:  
DATE of SERVICE: 2011  
TIME of SERVICE: 2:23 PM  
  
  
   
   
                                Anemia of chronic disease 2011  
   
                                Intestinal polyp 2011  
   
                                                      
  
  
Overview:Formatting of this note might be different from the original.  
Distal ileum ulcerated polyp.  
   
   
                                Nonspecific abnormal results of liver function s  
tudy 2006  
   
                                Impotence of organic origin 2006  
   
                                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  
                 2021  
  
documented as of this encounter (statuses as of 2022)  
[facility]06- History of Past illness Narrative*   
  
                                Problem         Noted Date      Resolved Date  
   
                                Medicare annual wellness visit, subsequent 2019  
   
                                Encounter for long-term (current) use of medicat  
ions 2018  
   
                                                      
  
  
Overview:Formatting of this note might be different from the original.  
Added automatically from request for surgery [id number]  
   
   
                                History of colonic polyps 2018  
   
                                                      
  
  
Overview:Formatting of this note might be different from the original.  
Added automatically from request for surgery [id number]  
   
   
                                Gastroesophageal reflux disease 2018  
   
                                                      
  
  
Overview:Formatting of this note might be different from the original.  
Added automatically from request for surgery [id number]  
   
   
                                Acute pulmonary edema 10/05/2011      10/06/2011  
   
                                                      
  
  
Overview:Formatting of this note might be different from the original.  
10/5/2011  
cardiogenic and noncardiogenic factors  
  
   
   
                                Atelectasis     10/05/2011      10/06/2011  
   
                                Hypotension     10/04/2011      10/06/2011  
   
                                                      
  
  
Overview:Formatting of this note might be different from the original.  
10/4/2011  
goal map 65-80  
   
   
                                Stress hyperglycemia 10/04/2011      10/06/2011  
   
                                                      
  
  
Overview:Formatting of this note might be different from the original.  
10/4/2011  
Perioperative insulin resistance and exacerbation of hyperglycemia.  
Control blood glucose with titration of insulin infusion  
  
10/5/2011  
adequate control  
goal FBG<180  
  
   
   
                                Post-op pain    10/04/2011      10/06/2011  
   
                                Mechanically assisted ventilation 10/04/2011      
  10/05/2011  
   
                                                      
  
  
Overview:Formatting of this note might be different from the original.  
10/4/2011  
optimize MV settings, WTE  
current setting:FiO2, 75%, peep 8  
   
   
                                Cardiac insufficiency 10/04/2011      10/05/2011  
   
                                                      
  
  
Overview:Formatting of this note might be different from the original.  
10/4/2011  
RV mild to moderate post pump  
goal CI>2.3  
   
   
                                Hypoxemia       10/04/2011      10/06/2011  
   
                                discharge planning 2011      10/19/2011  
   
                                                      
  
  
Overview:Formatting of this note might be different from the original.  
age 71,  lives in [place], [place]. No DC needs.  
/  
   
   
                                Pre-op testing  2011      10/04/2011  
   
                                                      
  
  
Overview:Formatting of this note is different from the original.  
Images from the original note were not included.  
[facility]  
PRE-OP CHECKLIST  
  
Surgeon: Deangelo Angulo M.D. Informed Consent Completed: No  
STS Score: 1.4%  
CAD: Yes - CAD on Problem List: Yes  
Is intended procedure a CABG: Yes - is a beta blocker ordered? Yes  
  
H & P completed: Yes  
  
PA/LAT: Completed CT: Completed MRI: N/A LE US: N/A  
  
Cath: Yes - reviewed: Yes Echo:Completed EKG: Completed EF %: 61  
  
PI's: N/A Carotid: Completed Mapping: N/A Dental: Completed PFT's: N/A  
  
Basename 11 0729  
WBC 7.18  
HB 13.1  
HCT 41.8  
  
INR 1.0  
CREAT 1.35  
  
UA: Normal  
HCG:N/A  
  
ABO/ABO Confirmed: Yes  
  
Blood ordered: No  
  
SA Swab: Yes - results: Pending  
  
Last Dose of Anticoagulation: vitamins  
  
Op Note: N/A  
  
Pacemaker Check: N/A  
  
Consults: GI 2001  
  
DM: No  
  
Cardiac Surgical prep: No  
  
SIGNATURE: Krystal Castellanos RN CHECKED BY:  
DATE of SERVICE: 2011  
TIME of SERVICE: 2:23 PM  
  
  
   
   
                                Anemia of chronic disease 2011  
   
                                Intestinal polyp 2011  
   
                                                      
  
  
Overview:Formatting of this note might be different from the original.  
Distal ileum ulcerated polyp.  
   
   
                                Nonspecific abnormal results of liver function s  
tudy 2006  
   
                                Impotence of organic origin 2006  
   
                                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  
                 2021  
  
documented as of this encounter (statuses as of 2022)  
[facility]06- History of Past illness Narrative*   
  
                                Problem         Noted Date      Resolved Date  
   
                                Medicare annual wellness visit, subsequent 2019  
   
                                Encounter for long-term (current) use of medicat  
ions 2018  
   
                                                      
  
  
Overview:Formatting of this note might be different from the original.  
Added automatically from request for surgery [id number]  
   
   
                                History of colonic polyps 2018  
   
                                                      
  
  
Overview:Formatting of this note might be different from the original.  
Added automatically from request for surgery [id number]  
   
   
                                Gastroesophageal reflux disease 2018  
   
                                                      
  
  
Overview:Formatting of this note might be different from the original.  
Added automatically from request for surgery [id number]  
   
   
                                Acute pulmonary edema 10/05/2011      10/06/2011  
   
                                                      
  
  
Overview:Formatting of this note might be different from the original.  
10/5/2011  
cardiogenic and noncardiogenic factors  
  
   
   
                                Atelectasis     10/05/2011      10/06/2011  
   
                                Hypotension     10/04/2011      10/06/2011  
   
                                                      
  
  
Overview:Formatting of this note might be different from the original.  
10/4/2011  
goal map 65-80  
   
   
                                Stress hyperglycemia 10/04/2011      10/06/2011  
   
                                                      
  
  
Overview:Formatting of this note might be different from the original.  
10/4/2011  
Perioperative insulin resistance and exacerbation of hyperglycemia.  
Control blood glucose with titration of insulin infusion  
  
10/5/2011  
adequate control  
goal FBG<180  
  
   
   
                                Post-op pain    10/04/2011      10/06/2011  
   
                                Mechanically assisted ventilation 10/04/2011      
  10/05/2011  
   
                                                      
  
  
Overview:Formatting of this note might be different from the original.  
10/4/2011  
optimize MV settings, WTE  
current setting:FiO2, 75%, peep 8  
   
   
                                Cardiac insufficiency 10/04/2011      10/05/2011  
   
                                                      
  
  
Overview:Formatting of this note might be different from the original.  
10/4/2011  
RV mild to moderate post pump  
goal CI>2.3  
   
   
                                Hypoxemia       10/04/2011      10/06/2011  
   
                                discharge planning 2011      10/19/2011  
   
                                                      
  
  
Overview:Formatting of this note might be different from the original.  
age 71,  lives in [place], [place]. No DC needs.  
/  
   
   
                                Pre-op testing  2011      10/04/2011  
   
                                                      
  
  
Overview:Formatting of this note is different from the original.  
Images from the original note were not included.  
[facility]  
PRE-OP CHECKLIST  
  
Surgeon: Deangelo Angulo M.D. Informed Consent Completed: No  
STS Score: 1.4%  
CAD: Yes - CAD on Problem List: Yes  
Is intended procedure a CABG: Yes - is a beta blocker ordered? Yes  
  
H & P completed: Yes  
  
PA/LAT: Completed CT: Completed MRI: N/A LE US: N/A  
  
Cath: Yes - reviewed: Yes Echo:Completed EKG: Completed EF %: 61  
  
PI's: N/A Carotid: Completed Mapping: N/A Dental: Completed PFT's: N/A  
  
Basename 11 0729  
WBC 7.18  
HB 13.1  
HCT 41.8  
  
INR 1.0  
CREAT 1.35  
  
UA: Normal  
HCG:N/A  
  
ABO/ABO Confirmed: Yes  
  
Blood ordered: No  
  
SA Swab: Yes - results: Pending  
  
Last Dose of Anticoagulation: vitamins  
  
Op Note: N/A  
  
Pacemaker Check: N/A  
  
Consults: GI 2001  
  
DM: No  
  
Cardiac Surgical prep: No  
  
SIGNATURE: Krystal Castellanos RN CHECKED BY:  
DATE of SERVICE: 2011  
TIME of SERVICE: 2:23 PM  
  
  
   
   
                                Anemia of chronic disease 2011  
   
                                Intestinal polyp 2011  
   
                                                      
  
  
Overview:Formatting of this note might be different from the original.  
Distal ileum ulcerated polyp.  
   
   
                                Nonspecific abnormal results of liver function s  
nialldy 2006  
   
                                Impotence of organic origin 2006  
   
                                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  
                 2021  
  
documented as of this encounter (statuses as of 2022)  
[facility]06- History of Past illness Narrative*   
  
                                Problem         Noted Date      Resolved Date  
   
                                Medicare annual wellness visit, subsequent 2019  
   
                                Encounter for long-term (current) use of medicat  
ions 2018  
   
                                                      
  
  
Overview:Formatting of this note might be different from the original.  
Added automatically from request for surgery [id number]  
   
   
                                History of colonic polyps 2018  
   
                                                      
  
  
Overview:Formatting of this note might be different from the original.  
Added automatically from request for surgery [id number]  
   
   
                                Gastroesophageal reflux disease 2018  
   
                                                      
  
  
Overview:Formatting of this note might be different from the original.  
Added automatically from request for surgery [id number]  
   
   
                                Acute pulmonary edema 10/05/2011      10/06/2011  
   
                                                      
  
  
Overview:Formatting of this note might be different from the original.  
10/5/2011  
cardiogenic and noncardiogenic factors  
  
   
   
                                Atelectasis     10/05/2011      10/06/2011  
   
                                Hypotension     10/04/2011      10/06/2011  
   
                                                      
  
  
Overview:Formatting of this note might be different from the original.  
10/4/2011  
goal map 65-80  
   
   
                                Stress hyperglycemia 10/04/2011      10/06/2011  
   
                                                      
  
  
Overview:Formatting of this note might be different from the original.  
10/4/2011  
Perioperative insulin resistance and exacerbation of hyperglycemia.  
Control blood glucose with titration of insulin infusion  
  
10/5/2011  
adequate control  
goal FBG<180  
  
   
   
                                Post-op pain    10/04/2011      10/06/2011  
   
                                Mechanically assisted ventilation 10/04/2011      
  10/05/2011  
   
                                                      
  
  
Overview:Formatting of this note might be different from the original.  
10/4/2011  
optimize MV settings, WTE  
current setting:FiO2, 75%, peep 8  
   
   
                                Cardiac insufficiency 10/04/2011      10/05/2011  
   
                                                      
  
  
Overview:Formatting of this note might be different from the original.  
10/4/2011  
RV mild to moderate post pump  
goal CI>2.3  
   
   
                                Hypoxemia       10/04/2011      10/06/2011  
   
                                discharge planning 2011      10/19/2011  
   
                                                      
  
  
Overview:Formatting of this note might be different from the original.  
age 71,  lives in [place], [place]. No DC needs.  
/  
   
   
                                Pre-op testing  2011      10/04/2011  
   
                                                      
  
  
Overview:Formatting of this note is different from the original.  
Images from the original note were not included.  
[facility]  
PRE-OP CHECKLIST  
  
Surgeon: Deangelo Angulo M.D. Informed Consent Completed: No  
STS Score: 1.4%  
CAD: Yes - CAD on Problem List: Yes  
Is intended procedure a CABG: Yes - is a beta blocker ordered? Yes  
  
H & P completed: Yes  
  
PA/LAT: Completed CT: Completed MRI: N/A LE US: N/A  
  
Cath: Yes - reviewed: Yes Echo:Completed EKG: Completed EF %: 61  
  
PI's: N/A Carotid: Completed Mapping: N/A Dental: Completed PFT's: N/A  
  
Basename 11 0729  
WBC 7.18  
HB 13.1  
HCT 41.8  
  
INR 1.0  
CREAT 1.35  
  
UA: Normal  
HCG:N/A  
  
ABO/ABO Confirmed: Yes  
  
Blood ordered: No  
  
SA Swab: Yes - results: Pending  
  
Last Dose of Anticoagulation: vitamins  
  
Op Note: N/A  
  
Pacemaker Check: N/A  
  
Consults: GI 2001  
  
DM: No  
  
Cardiac Surgical prep: No  
  
SIGNATURE: Krystal Castellanos RN CHECKED BY:  
DATE of SERVICE: 2011  
TIME of SERVICE: 2:23 PM  
  
  
   
   
                                Anemia of chronic disease 2011  
   
                                Intestinal polyp 2011  
   
                                                      
  
  
Overview:Formatting of this note might be different from the original.  
Distal ileum ulcerated polyp.  
   
   
                                Nonspecific abnormal results of liver function s  
tudy 2006  
   
                                Impotence of organic origin 2006  
   
                                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  
                 2021  
  
documented as of this encounter (statuses as of 2022)  
[facility]06- History of Past illness Narrative*   
  
                                Problem         Noted Date      Resolved Date  
   
                                Medicare annual wellness visit, subsequent 2019  
   
                                Encounter for long-term (current) use of medicat  
ions 2018  
   
                                                      
  
  
Overview:Formatting of this note might be different from the original.  
Added automatically from request for surgery [id number]  
   
   
                                History of colonic polyps 2018  
   
                                                      
  
  
Overview:Formatting of this note might be different from the original.  
Added automatically from request for surgery [id number]  
   
   
                                Gastroesophageal reflux disease 2018  
   
                                                      
  
  
Overview:Formatting of this note might be different from the original.  
Added automatically from request for surgery [id number]  
   
   
                                Acute pulmonary edema 10/05/2011      10/06/2011  
   
                                                      
  
  
Overview:Formatting of this note might be different from the original.  
10/5/2011  
cardiogenic and noncardiogenic factors  
  
   
   
                                Atelectasis     10/05/2011      10/06/2011  
   
                                Hypotension     10/04/2011      10/06/2011  
   
                                                      
  
  
Overview:Formatting of this note might be different from the original.  
10/4/2011  
goal map 65-80  
   
   
                                Stress hyperglycemia 10/04/2011      10/06/2011  
   
                                                      
  
  
Overview:Formatting of this note might be different from the original.  
10/4/2011  
Perioperative insulin resistance and exacerbation of hyperglycemia.  
Control blood glucose with titration of insulin infusion  
  
10/5/2011  
adequate control  
goal FBG<180  
  
   
   
                                Post-op pain    10/04/2011      10/06/2011  
   
                                Mechanically assisted ventilation 10/04/2011      
  10/05/2011  
   
                                                      
  
  
Overview:Formatting of this note might be different from the original.  
10/4/2011  
optimize MV settings, WTE  
current setting:FiO2, 75%, peep 8  
   
   
                                Cardiac insufficiency 10/04/2011      10/05/2011  
   
                                                      
  
  
Overview:Formatting of this note might be different from the original.  
10/4/2011  
RV mild to moderate post pump  
goal CI>2.3  
   
   
                                Hypoxemia       10/04/2011      10/06/2011  
   
                                discharge planning 2011      10/19/2011  
   
                                                      
  
  
Overview:Formatting of this note might be different from the original.  
age 71,  lives in [place], [place]. No DC needs.  
/  
   
   
                                Pre-op testing  2011      10/04/2011  
   
                                                      
  
  
Overview:Formatting of this note is different from the original.  
Images from the original note were not included.  
[facility]  
PRE-OP CHECKLIST  
  
Surgeon: Deangelo Angulo M.D. Informed Consent Completed: No  
STS Score: 1.4%  
CAD: Yes - CAD on Problem List: Yes  
Is intended procedure a CABG: Yes - is a beta blocker ordered? Yes  
  
H & P completed: Yes  
  
PA/LAT: Completed CT: Completed MRI: N/A LE US: N/A  
  
Cath: Yes - reviewed: Yes Echo:Completed EKG: Completed EF %: 61  
  
PI's: N/A Carotid: Completed Mapping: N/A Dental: Completed PFT's: N/A  
  
Basename 11 0729  
WBC 7.18  
HB 13.1  
HCT 41.8  
  
INR 1.0  
CREAT 1.35  
  
UA: Normal  
HCG:N/A  
  
ABO/ABO Confirmed: Yes  
  
Blood ordered: No  
  
SA Swab: Yes - results: Pending  
  
Last Dose of Anticoagulation: vitamins  
  
Op Note: N/A  
  
Pacemaker Check: N/A  
  
Consults: GI 2001  
  
DM: No  
  
Cardiac Surgical prep: No  
  
SIGNATURE: Krystal Castellanos RN CHECKED BY:  
DATE of SERVICE: 2011  
TIME of SERVICE: 2:23 PM  
  
  
   
   
                                Anemia of chronic disease 2011  
   
                                Intestinal polyp 2011  
   
                                                      
  
  
Overview:Formatting of this note might be different from the original.  
Distal ileum ulcerated polyp.  
   
   
                                Nonspecific abnormal results of liver function s  
tudy 2006  
   
                                Impotence of organic origin 2006  
   
                                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  
                 2021  
  
documented as of this encounter (statuses as of 2022)  
[facility]06- History of Past illness Narrative*   
  
                                Problem         Noted Date      Resolved Date  
   
                                Medicare annual wellness visit, subsequent 2019  
   
                                Encounter for long-term (current) use of medicat  
ions 2018  
   
                                                      
  
  
Overview:Formatting of this note might be different from the original.  
Added automatically from request for surgery [id number]  
   
   
                                History of colonic polyps 2018  
   
                                                      
  
  
Overview:Formatting of this note might be different from the original.  
Added automatically from request for surgery [id number]  
   
   
                                Gastroesophageal reflux disease 2018  
   
                                                      
  
  
Overview:Formatting of this note might be different from the original.  
Added automatically from request for surgery [id number]  
   
   
                                Acute pulmonary edema 10/05/2011      10/06/2011  
   
                                                      
  
  
Overview:Formatting of this note might be different from the original.  
10/5/2011  
cardiogenic and noncardiogenic factors  
  
   
   
                                Atelectasis     10/05/2011      10/06/2011  
   
                                Hypotension     10/04/2011      10/06/2011  
   
                                                      
  
  
Overview:Formatting of this note might be different from the original.  
10/4/2011  
goal map 65-80  
   
   
                                Stress hyperglycemia 10/04/2011      10/06/2011  
   
                                                      
  
  
Overview:Formatting of this note might be different from the original.  
10/4/2011  
Perioperative insulin resistance and exacerbation of hyperglycemia.  
Control blood glucose with titration of insulin infusion  
  
10/5/2011  
adequate control  
goal FBG<180  
  
   
   
                                Post-op pain    10/04/2011      10/06/2011  
   
                                Mechanically assisted ventilation 10/04/2011      
  10/05/2011  
   
                                                      
  
  
Overview:Formatting of this note might be different from the original.  
10/4/2011  
optimize MV settings, WTE  
current setting:FiO2, 75%, peep 8  
   
   
                                Cardiac insufficiency 10/04/2011      10/05/2011  
   
                                                      
  
  
Overview:Formatting of this note might be different from the original.  
10/4/2011  
RV mild to moderate post pump  
goal CI>2.3  
   
   
                                Hypoxemia       10/04/2011      10/06/2011  
   
                                discharge planning 2011      10/19/2011  
   
                                                      
  
  
Overview:Formatting of this note might be different from the original.  
age 71,  lives in [place], [place]. No DC needs.  
/  
   
   
                                Pre-op testing  2011      10/04/2011  
   
                                                      
  
  
Overview:Formatting of this note is different from the original.  
Images from the original note were not included.  
[facility]  
PRE-OP CHECKLIST  
  
Surgeon: Deangelo Angulo M.D. Informed Consent Completed: No  
STS Score: 1.4%  
CAD: Yes - CAD on Problem List: Yes  
Is intended procedure a CABG: Yes - is a beta blocker ordered? Yes  
  
H & P completed: Yes  
  
PA/LAT: Completed CT: Completed MRI: N/A LE US: N/A  
  
Cath: Yes - reviewed: Yes Echo:Completed EKG: Completed EF %: 61  
  
PI's: N/A Carotid: Completed Mapping: N/A Dental: Completed PFT's: N/A  
  
Basename 11 0729  
WBC 7.18  
HB 13.1  
HCT 41.8  
  
INR 1.0  
CREAT 1.35  
  
UA: Normal  
HCG:N/A  
  
ABO/ABO Confirmed: Yes  
  
Blood ordered: No  
  
SA Swab: Yes - results: Pending  
  
Last Dose of Anticoagulation: vitamins  
  
Op Note: N/A  
  
Pacemaker Check: N/A  
  
Consults: GI 2001  
  
DM: No  
  
Cardiac Surgical prep: No  
  
SIGNATURE: Krystal Castellanos RN CHECKED BY:  
DATE of SERVICE: 2011  
TIME of SERVICE: 2:23 PM  
  
  
   
   
                                Anemia of chronic disease 2011  
   
                                Intestinal polyp 2011  
   
                                                      
  
  
Overview:Formatting of this note might be different from the original.  
Distal ileum ulcerated polyp.  
   
   
                                Nonspecific abnormal results of liver function s  
tudy 2006  
   
                                Impotence of organic origin 2006  
   
                                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  
                 2021  
  
documented as of this encounter (statuses as of 2022)  
[facility]06- History of Past illness Narrative*   
  
                                Problem         Noted Date      Resolved Date  
   
                                Medicare annual wellness visit, subsequent 2019  
   
                                Encounter for long-term (current) use of medicat  
ions 2018  
   
                                                      
  
  
Overview:Formatting of this note might be different from the original.  
Added automatically from request for surgery [id number]  
   
   
                                History of colonic polyps 2018  
   
                                                      
  
  
Overview:Formatting of this note might be different from the original.  
Added automatically from request for surgery [id number]  
   
   
                                Gastroesophageal reflux disease 2018  
   
                                                      
  
  
Overview:Formatting of this note might be different from the original.  
Added automatically from request for surgery [id number]  
   
   
                                Acute pulmonary edema 10/05/2011      10/06/2011  
   
                                                      
  
  
Overview:Formatting of this note might be different from the original.  
10/5/2011  
cardiogenic and noncardiogenic factors  
  
   
   
                                Atelectasis     10/05/2011      10/06/2011  
   
                                Hypotension     10/04/2011      10/06/2011  
   
                                                      
  
  
Overview:Formatting of this note might be different from the original.  
10/4/2011  
goal map 65-80  
   
   
                                Stress hyperglycemia 10/04/2011      10/06/2011  
   
                                                      
  
  
Overview:Formatting of this note might be different from the original.  
10/4/2011  
Perioperative insulin resistance and exacerbation of hyperglycemia.  
Control blood glucose with titration of insulin infusion  
  
10/5/2011  
adequate control  
goal FBG<180  
  
   
   
                                Post-op pain    10/04/2011      10/06/2011  
   
                                Mechanically assisted ventilation 10/04/2011      
  10/05/2011  
   
                                                      
  
  
Overview:Formatting of this note might be different from the original.  
10/4/2011  
optimize MV settings, WTE  
current setting:FiO2, 75%, peep 8  
   
   
                                Cardiac insufficiency 10/04/2011      10/05/2011  
   
                                                      
  
  
Overview:Formatting of this note might be different from the original.  
10/4/2011  
RV mild to moderate post pump  
goal CI>2.3  
   
   
                                Hypoxemia       10/04/2011      10/06/2011  
   
                                discharge planning 2011      10/19/2011  
   
                                                      
  
  
Overview:Formatting of this note might be different from the original.  
age 71,  lives in [place], [place]. No DC needs.  
/  
   
   
                                Pre-op testing  2011      10/04/2011  
   
                                                      
  
  
Overview:Formatting of this note is different from the original.  
Images from the original note were not included.  
[facility]  
PRE-OP CHECKLIST  
  
Surgeon: Deangelo Angulo M.D. Informed Consent Completed: No  
STS Score: 1.4%  
CAD: Yes - CAD on Problem List: Yes  
Is intended procedure a CABG: Yes - is a beta blocker ordered? Yes  
  
H & P completed: Yes  
  
PA/LAT: Completed CT: Completed MRI: N/A LE US: N/A  
  
Cath: Yes - reviewed: Yes Echo:Completed EKG: Completed EF %: 61  
  
PI's: N/A Carotid: Completed Mapping: N/A Dental: Completed PFT's: N/A  
  
Basename 11 0729  
WBC 7.18  
HB 13.1  
HCT 41.8  
  
INR 1.0  
CREAT 1.35  
  
UA: Normal  
HCG:N/A  
  
ABO/ABO Confirmed: Yes  
  
Blood ordered: No  
  
SA Swab: Yes - results: Pending  
  
Last Dose of Anticoagulation: vitamins  
  
Op Note: N/A  
  
Pacemaker Check: N/A  
  
Consults: GI 2001  
  
DM: No  
  
Cardiac Surgical prep: No  
  
SIGNATURE: Krystal Castellanos RN CHECKED BY:  
DATE of SERVICE: 2011  
TIME of SERVICE: 2:23 PM  
  
  
   
   
                                Anemia of chronic disease 2011  
   
                                Intestinal polyp 2011  
   
                                                      
  
  
Overview:Formatting of this note might be different from the original.  
Distal ileum ulcerated polyp.  
   
   
                                Nonspecific abnormal results of liver function s  
pepe 2006  
   
                                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  
2015  
   
                                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  
                 2021  
  
documented as of this encounter (statuses as of 2022)  
[facility]06- History of Past illness Narrative*   
  
                                Problem         Noted Date      Resolved Date  
   
                                Medicare annual wellness visit, subsequent 2019  
   
                                Encounter for long-term (current) use of medicat  
ions 2018  
   
                                                      
  
  
Overview:Formatting of this note might be different from the original.  
Added automatically from request for surgery [id number]  
   
   
                                History of colonic polyps 2018  
   
                                                      
  
  
Overview:Formatting of this note might be different from the original.  
Added automatically from request for surgery [id number]  
   
   
                                Gastroesophageal reflux disease 2018  
   
                                                      
  
  
Overview:Formatting of this note might be different from the original.  
Added automatically from request for surgery [id number]  
   
   
                                Acute pulmonary edema 10/05/2011      10/06/2011  
   
                                                      
  
  
Overview:Formatting of this note might be different from the original.  
10/5/2011  
cardiogenic and noncardiogenic factors  
  
   
   
                                Atelectasis     10/05/2011      10/06/2011  
   
                                Hypotension     10/04/2011      10/06/2011  
   
                                                      
  
  
Overview:Formatting of this note might be different from the original.  
10/4/2011  
goal map 65-80  
   
   
                                Stress hyperglycemia 10/04/2011      10/06/2011  
   
                                                      
  
  
Overview:Formatting of this note might be different from the original.  
10/4/2011  
Perioperative insulin resistance and exacerbation of hyperglycemia.  
Control blood glucose with titration of insulin infusion  
  
10/5/2011  
adequate control  
goal FBG<180  
  
   
   
                                Post-op pain    10/04/2011      10/06/2011  
   
                                Mechanically assisted ventilation 10/04/2011      
  10/05/2011  
   
                                                      
  
  
Overview:Formatting of this note might be different from the original.  
10/4/2011  
optimize MV settings, WTE  
current setting:FiO2, 75%, peep 8  
   
   
                                Cardiac insufficiency 10/04/2011      10/05/2011  
   
                                                      
  
  
Overview:Formatting of this note might be different from the original.  
10/4/2011  
RV mild to moderate post pump  
goal CI>2.3  
   
   
                                Hypoxemia       10/04/2011      10/06/2011  
   
                                discharge planning 2011      10/19/2011  
   
                                                      
  
  
Overview:Formatting of this note might be different from the original.  
age 71,  lives in [place], [place]. No DC needs.  
/  
   
   
                                Pre-op testing  2011      10/04/2011  
   
                                                      
  
  
Overview:Formatting of this note is different from the original.  
Images from the original note were not included.  
[facility]  
PRE-OP CHECKLIST  
  
Surgeon: Deangelo Angulo M.D. Informed Consent Completed: No  
STS Score: 1.4%  
CAD: Yes - CAD on Problem List: Yes  
Is intended procedure a CABG: Yes - is a beta blocker ordered? Yes  
  
H & P completed: Yes  
  
PA/LAT: Completed CT: Completed MRI: N/A LE US: N/A  
  
Cath: Yes - reviewed: Yes Echo:Completed EKG: Completed EF %: 61  
  
PI's: N/A Carotid: Completed Mapping: N/A Dental: Completed PFT's: N/A  
  
Basename 11 0729  
WBC 7.18  
HB 13.1  
HCT 41.8  
  
INR 1.0  
CREAT 1.35  
  
UA: Normal  
HCG:N/A  
  
ABO/ABO Confirmed: Yes  
  
Blood ordered: No  
  
SA Swab: Yes - results: Pending  
  
Last Dose of Anticoagulation: vitamins  
  
Op Note: N/A  
  
Pacemaker Check: N/A  
  
Consults: GI 2001  
  
DM: No  
  
Cardiac Surgical prep: No  
  
SIGNATURE: Krystal Castellanos RN CHECKED BY:  
DATE of SERVICE: 2011  
TIME of SERVICE: 2:23 PM  
  
  
   
   
                                Anemia of chronic disease 2011  
   
                                Intestinal polyp 2011  
   
                                                      
  
  
Overview:Formatting of this note might be different from the original.  
Distal ileum ulcerated polyp.  
   
   
                                Nonspecific abnormal results of liver function s  
nialldy 2006  
   
                                Impotence of organic origin 2006  
   
                                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  
                 2021  
  
documented as of this encounter (statuses as of 2022)  
[facility]- History of Past illness Narrative*   
  
                                Problem         Noted Date      Resolved Date  
   
                                Medicare annual wellness visit, subsequent 2019  
   
                                Encounter for long-term (current) use of medicat  
ions 2018  
   
                                                      
  
  
Overview:Formatting of this note might be different from the original.  
Added automatically from request for surgery [id number]  
   
   
                                History of colonic polyps 2018  
   
                                                      
  
  
Overview:Formatting of this note might be different from the original.  
Added automatically from request for surgery [id number]  
   
   
                                Gastroesophageal reflux disease 2018  
   
                                                      
  
  
Overview:Formatting of this note might be different from the original.  
Added automatically from request for surgery [id number]  
   
   
                                Acute pulmonary edema 10/05/2011      10/06/2011  
   
                                                      
  
  
Overview:Formatting of this note might be different from the original.  
10/5/2011  
cardiogenic and noncardiogenic factors  
  
   
   
                                Atelectasis     10/05/2011      10/06/2011  
   
                                Hypotension     10/04/2011      10/06/2011  
   
                                                      
  
  
Overview:Formatting of this note might be different from the original.  
10/4/2011  
goal map 65-80  
   
   
                                Stress hyperglycemia 10/04/2011      10/06/2011  
   
                                                      
  
  
Overview:Formatting of this note might be different from the original.  
10/4/2011  
Perioperative insulin resistance and exacerbation of hyperglycemia.  
Control blood glucose with titration of insulin infusion  
  
10/5/2011  
adequate control  
goal FBG<180  
  
   
   
                                Post-op pain    10/04/2011      10/06/2011  
   
                                Mechanically assisted ventilation 10/04/2011      
  10/05/2011  
   
                                                      
  
  
Overview:Formatting of this note might be different from the original.  
10/4/2011  
optimize MV settings, WTE  
current setting:FiO2, 75%, peep 8  
   
   
                                Cardiac insufficiency 10/04/2011      10/05/2011  
   
                                                      
  
  
Overview:Formatting of this note might be different from the original.  
10/4/2011  
RV mild to moderate post pump  
goal CI>2.3  
   
   
                                Hypoxemia       10/04/2011      10/06/2011  
   
                                discharge planning 2011      10/19/2011  
   
                                                      
  
  
Overview:Formatting of this note might be different from the original.  
age 71,  lives in [place], [place]. No DC needs.  
/  
   
   
                                Pre-op testing  2011      10/04/2011  
   
                                                      
  
  
Overview:Formatting of this note is different from the original.  
Images from the original note were not included.  
[facility]  
PRE-OP CHECKLIST  
  
Surgeon: Deangelo Angulo M.D. Informed Consent Completed: No  
STS Score: 1.4%  
CAD: Yes - CAD on Problem List: Yes  
Is intended procedure a CABG: Yes - is a beta blocker ordered? Yes  
  
H & P completed: Yes  
  
PA/LAT: Completed CT: Completed MRI: N/A LE US: N/A  
  
Cath: Yes - reviewed: Yes Echo:Completed EKG: Completed EF %: 61  
  
PI's: N/A Carotid: Completed Mapping: N/A Dental: Completed PFT's: N/A  
  
Basename 11 0729  
WBC 7.18  
HB 13.1  
HCT 41.8  
  
INR 1.0  
CREAT 1.35  
  
UA: Normal  
HCG:N/A  
  
ABO/ABO Confirmed: Yes  
  
Blood ordered: No  
  
SA Swab: Yes - results: Pending  
  
Last Dose of Anticoagulation: vitamins  
  
Op Note: N/A  
  
Pacemaker Check: N/A  
  
Consults: GI 2001  
  
DM: No  
  
Cardiac Surgical prep: No  
  
SIGNATURE: Krystal Castellanos RN CHECKED BY:  
DATE of SERVICE: 2011  
TIME of SERVICE: 2:23 PM  
  
  
   
   
                                Anemia of chronic disease 2011  
   
                                Intestinal polyp 2011  
   
                                                      
  
  
Overview:Formatting of this note might be different from the original.  
Distal ileum ulcerated polyp.  
   
   
                                Nonspecific abnormal results of liver function s  
tudy 2006  
   
                                Impotence of organic origin 2006  
   
                                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  
                 2021  
  
documented as of this encounter (statuses as of 2022)  
[facility]- History of Past illness Narrative*   
  
                                Problem         Noted Date      Resolved Date  
   
                                Medicare annual wellness visit, subsequent 2019  
   
                                Encounter for long-term (current) use of medicat  
ions 2018  
   
                                                      
  
  
Overview:Formatting of this note might be different from the original.  
Added automatically from request for surgery [id number]  
   
   
                                History of colonic polyps 2018  
   
                                                      
  
  
Overview:Formatting of this note might be different from the original.  
Added automatically from request for surgery [id number]  
   
   
                                Gastroesophageal reflux disease 2018  
   
                                                      
  
  
Overview:Formatting of this note might be different from the original.  
Added automatically from request for surgery [id number]  
   
   
                                Acute pulmonary edema 10/05/2011      10/06/2011  
   
                                                      
  
  
Overview:Formatting of this note might be different from the original.  
10/5/2011  
cardiogenic and noncardiogenic factors  
  
   
   
                                Atelectasis     10/05/2011      10/06/2011  
   
                                Hypotension     10/04/2011      10/06/2011  
   
                                                      
  
  
Overview:Formatting of this note might be different from the original.  
10/4/2011  
goal map 65-80  
   
   
                                Stress hyperglycemia 10/04/2011      10/06/2011  
   
                                                      
  
  
Overview:Formatting of this note might be different from the original.  
10/4/2011  
Perioperative insulin resistance and exacerbation of hyperglycemia.  
Control blood glucose with titration of insulin infusion  
  
10/5/2011  
adequate control  
goal FBG<180  
  
   
   
                                Post-op pain    10/04/2011      10/06/2011  
   
                                Mechanically assisted ventilation 10/04/2011      
  10/05/2011  
   
                                                      
  
  
Overview:Formatting of this note might be different from the original.  
10/4/2011  
optimize MV settings, WTE  
current setting:FiO2, 75%, peep 8  
   
   
                                Cardiac insufficiency 10/04/2011      10/05/2011  
   
                                                      
  
  
Overview:Formatting of this note might be different from the original.  
10/4/2011  
RV mild to moderate post pump  
goal CI>2.3  
   
   
                                Hypoxemia       10/04/2011      10/06/2011  
   
                                discharge planning 2011      10/19/2011  
   
                                                      
  
  
Overview:Formatting of this note might be different from the original.  
age 71,  lives in [place], [place]. No DC needs.  
/  
   
   
                                Pre-op testing  2011      10/04/2011  
   
                                                      
  
  
Overview:Formatting of this note is different from the original.  
Images from the original note were not included.  
[facility]  
PRE-OP CHECKLIST  
  
Surgeon: Deangelo Angulo M.D. Informed Consent Completed: No  
STS Score: 1.4%  
CAD: Yes - CAD on Problem List: Yes  
Is intended procedure a CABG: Yes - is a beta blocker ordered? Yes  
  
H & P completed: Yes  
  
PA/LAT: Completed CT: Completed MRI: N/A LE US: N/A  
  
Cath: Yes - reviewed: Yes Echo:Completed EKG: Completed EF %: 61  
  
PI's: N/A Carotid: Completed Mapping: N/A Dental: Completed PFT's: N/A  
  
Basename 11 0729  
WBC 7.18  
HB 13.1  
HCT 41.8  
  
INR 1.0  
CREAT 1.35  
  
UA: Normal  
HCG:N/A  
  
ABO/ABO Confirmed: Yes  
  
Blood ordered: No  
  
SA Swab: Yes - results: Pending  
  
Last Dose of Anticoagulation: vitamins  
  
Op Note: N/A  
  
Pacemaker Check: N/A  
  
Consults: GI 2001  
  
DM: No  
  
Cardiac Surgical prep: No  
  
SIGNATURE: Krystal Castellanos RN CHECKED BY:  
DATE of SERVICE: 2011  
TIME of SERVICE: 2:23 PM  
  
  
   
   
                                Anemia of chronic disease 2011  
   
                                Intestinal polyp 2011  
   
                                                      
  
  
Overview:Formatting of this note might be different from the original.  
Distal ileum ulcerated polyp.  
   
   
                                Nonspecific abnormal results of liver function s  
pepe 2006  
   
                                Impotence of organic origin 2006  
   
                                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  
                 2021  
  
documented as of this encounter (statuses as of 2022)  
[facility]06- History of Past illness Narrative*   
  
                                Problem         Noted Date      Resolved Date  
   
                                Medicare annual wellness visit, subsequent 2019  
   
                                Encounter for long-term (current) use of medicat  
ions 2018  
   
                                                      
  
  
Overview:Formatting of this note might be different from the original.  
Added automatically from request for surgery [id number]  
   
   
                                History of colonic polyps 2018  
   
                                                      
  
  
Overview:Formatting of this note might be different from the original.  
Added automatically from request for surgery [id number]  
   
   
                                Gastroesophageal reflux disease 2018  
   
                                                      
  
  
Overview:Formatting of this note might be different from the original.  
Added automatically from request for surgery [id number]  
   
   
                                Acute pulmonary edema 10/05/2011      10/06/2011  
   
                                                      
  
  
Overview:Formatting of this note might be different from the original.  
10/5/2011  
cardiogenic and noncardiogenic factors  
  
   
   
                                Atelectasis     10/05/2011      10/06/2011  
   
                                Hypotension     10/04/2011      10/06/2011  
   
                                                      
  
  
Overview:Formatting of this note might be different from the original.  
10/4/2011  
goal map 65-80  
   
   
                                Stress hyperglycemia 10/04/2011      10/06/2011  
   
                                                      
  
  
Overview:Formatting of this note might be different from the original.  
10/4/2011  
Perioperative insulin resistance and exacerbation of hyperglycemia.  
Control blood glucose with titration of insulin infusion  
  
10/5/2011  
adequate control  
goal FBG<180  
  
   
   
                                Post-op pain    10/04/2011      10/06/2011  
   
                                Mechanically assisted ventilation 10/04/2011      
  10/05/2011  
   
                                                      
  
  
Overview:Formatting of this note might be different from the original.  
10/4/2011  
optimize MV settings, WTE  
current setting:FiO2, 75%, peep 8  
   
   
                                Cardiac insufficiency 10/04/2011      10/05/2011  
   
                                                      
  
  
Overview:Formatting of this note might be different from the original.  
10/4/2011  
RV mild to moderate post pump  
goal CI>2.3  
   
   
                                Hypoxemia       10/04/2011      10/06/2011  
   
                                discharge planning 2011      10/19/2011  
   
                                                      
  
  
Overview:Formatting of this note might be different from the original.  
age 71,  lives in [place], [place]. No DC needs.  
/  
   
   
                                Pre-op testing  2011      10/04/2011  
   
                                                      
  
  
Overview:Formatting of this note is different from the original.  
Images from the original note were not included.  
[facility]  
PRE-OP CHECKLIST  
  
Surgeon: Deangelo Angulo M.D. Informed Consent Completed: No  
STS Score: 1.4%  
CAD: Yes - CAD on Problem List: Yes  
Is intended procedure a CABG: Yes - is a beta blocker ordered? Yes  
  
H & P completed: Yes  
  
PA/LAT: Completed CT: Completed MRI: N/A LE US: N/A  
  
Cath: Yes - reviewed: Yes Echo:Completed EKG: Completed EF %: 61  
  
PI's: N/A Carotid: Completed Mapping: N/A Dental: Completed PFT's: N/A  
  
Basename 11 0729  
WBC 7.18  
HB 13.1  
HCT 41.8  
  
INR 1.0  
CREAT 1.35  
  
UA: Normal  
HCG:N/A  
  
ABO/ABO Confirmed: Yes  
  
Blood ordered: No  
  
SA Swab: Yes - results: Pending  
  
Last Dose of Anticoagulation: vitamins  
  
Op Note: N/A  
  
Pacemaker Check: N/A  
  
Consults: GI 2001  
  
DM: No  
  
Cardiac Surgical prep: No  
  
SIGNATURE: Krystal Castellanos RN CHECKED BY:  
DATE of SERVICE: 2011  
TIME of SERVICE: 2:23 PM  
  
  
   
   
                                Anemia of chronic disease 2011  
   
                                Intestinal polyp 2011  
   
                                                      
  
  
Overview:Formatting of this note might be different from the original.  
Distal ileum ulcerated polyp.  
   
   
                                Nonspecific abnormal results of liver function lavon cantu 2006  
   
                                Impotence of organic origin 2006  
   
                                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  
                 2021  
  
documented as of this encounter (statuses as of 10/02/2022)  
[facility]06- History of Past illness Narrative*   
  
                                Problem         Noted Date      Resolved Date  
   
                                Medicare annual wellness visit, subsequent 2019  
   
                                Encounter for long-term (current) use of medicat  
ions 2018  
   
                                                      
  
  
Overview:Formatting of this note might be different from the original.  
Added automatically from request for surgery [id number]  
   
   
                                History of colonic polyps 2018  
   
                                                      
  
  
Overview:Formatting of this note might be different from the original.  
Added automatically from request for surgery [id number]  
   
   
                                Gastroesophageal reflux disease 2018  
   
                                                      
  
  
Overview:Formatting of this note might be different from the original.  
Added automatically from request for surgery [id number]  
   
   
                                Acute pulmonary edema 10/05/2011      10/06/2011  
   
                                                      
  
  
Overview:Formatting of this note might be different from the original.  
10/5/2011  
cardiogenic and noncardiogenic factors  
  
   
   
                                Atelectasis     10/05/2011      10/06/2011  
   
                                Hypotension     10/04/2011      10/06/2011  
   
                                                      
  
  
Overview:Formatting of this note might be different from the original.  
10/4/2011  
goal map 65-80  
   
   
                                Stress hyperglycemia 10/04/2011      10/06/2011  
   
                                                      
  
  
Overview:Formatting of this note might be different from the original.  
10/4/2011  
Perioperative insulin resistance and exacerbation of hyperglycemia.  
Control blood glucose with titration of insulin infusion  
  
10/5/2011  
adequate control  
goal FBG<180  
  
   
   
                                Post-op pain    10/04/2011      10/06/2011  
   
                                Mechanically assisted ventilation 10/04/2011      
  10/05/2011  
   
                                                      
  
  
Overview:Formatting of this note might be different from the original.  
10/4/2011  
optimize MV settings, WTE  
current setting:FiO2, 75%, peep 8  
   
   
                                Cardiac insufficiency 10/04/2011      10/05/2011  
   
                                                      
  
  
Overview:Formatting of this note might be different from the original.  
10/4/2011  
RV mild to moderate post pump  
goal CI>2.3  
   
   
                                Hypoxemia       10/04/2011      10/06/2011  
   
                                discharge planning 2011      10/19/2011  
   
                                                      
  
  
Overview:Formatting of this note might be different from the original.  
age 71,  lives in [place], [place]. No DC needs.  
/  
   
   
                                Pre-op testing  2011      10/04/2011  
   
                                                      
  
  
Overview:Formatting of this note is different from the original.  
Images from the original note were not included.  
[facility]  
PRE-OP CHECKLIST  
  
Surgeon: Deangelo Angulo M.D. Informed Consent Completed: No  
STS Score: 1.4%  
CAD: Yes - CAD on Problem List: Yes  
Is intended procedure a CABG: Yes - is a beta blocker ordered? Yes  
  
H & P completed: Yes  
  
PA/LAT: Completed CT: Completed MRI: N/A LE US: N/A  
  
Cath: Yes - reviewed: Yes Echo:Completed EKG: Completed EF %: 61  
  
PI's: N/A Carotid: Completed Mapping: N/A Dental: Completed PFT's: N/A  
  
Basename 11 0729  
WBC 7.18  
HB 13.1  
HCT 41.8  
  
INR 1.0  
CREAT 1.35  
  
UA: Normal  
HCG:N/A  
  
ABO/ABO Confirmed: Yes  
  
Blood ordered: No  
  
SA Swab: Yes - results: Pending  
  
Last Dose of Anticoagulation: vitamins  
  
Op Note: N/A  
  
Pacemaker Check: N/A  
  
Consults: GI 2001  
  
DM: No  
  
Cardiac Surgical prep: No  
  
SIGNATURE: Krystal Castellanos RN CHECKED BY:  
DATE of SERVICE: 2011  
TIME of SERVICE: 2:23 PM  
  
  
   
   
                                Anemia of chronic disease 2011  
   
                                Intestinal polyp 2011  
   
                                                      
  
  
Overview:Formatting of this note might be different from the original.  
Distal ileum ulcerated polyp.  
   
   
                                Nonspecific abnormal results of liver function s  
nialldy 2006  
   
                                Impotence of organic origin 2006  
   
                                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  
                 2021  
  
documented as of this encounter (statuses as of 10/03/2022)  
[facility]06- History of Past illness Narrative*   
  
                                Problem         Noted Date      Resolved Date  
   
                                Medicare annual wellness visit, subsequent 2019  
   
                                Encounter for long-term (current) use of medicat  
ions 2018  
   
                                                      
  
  
Overview:Formatting of this note might be different from the original.  
Added automatically from request for surgery [id number]  
   
   
                                History of colonic polyps 2018  
   
                                                      
  
  
Overview:Formatting of this note might be different from the original.  
Added automatically from request for surgery [id number]  
   
   
                                Gastroesophageal reflux disease 2018  
   
                                                      
  
  
Overview:Formatting of this note might be different from the original.  
Added automatically from request for surgery [id number]  
   
   
                                Acute pulmonary edema 10/05/2011      10/06/2011  
   
                                                      
  
  
Overview:Formatting of this note might be different from the original.  
10/5/2011  
cardiogenic and noncardiogenic factors  
  
   
   
                                Atelectasis     10/05/2011      10/06/2011  
   
                                Hypotension     10/04/2011      10/06/2011  
   
                                                      
  
  
Overview:Formatting of this note might be different from the original.  
10/4/2011  
goal map 65-80  
   
   
                                Stress hyperglycemia 10/04/2011      10/06/2011  
   
                                                      
  
  
Overview:Formatting of this note might be different from the original.  
10/4/2011  
Perioperative insulin resistance and exacerbation of hyperglycemia.  
Control blood glucose with titration of insulin infusion  
  
10/5/2011  
adequate control  
goal FBG<180  
  
   
   
                                Post-op pain    10/04/2011      10/06/2011  
   
                                Mechanically assisted ventilation 10/04/2011      
  10/05/2011  
   
                                                      
  
  
Overview:Formatting of this note might be different from the original.  
10/4/2011  
optimize MV settings, WTE  
current setting:FiO2, 75%, peep 8  
   
   
                                Cardiac insufficiency 10/04/2011      10/05/2011  
   
                                                      
  
  
Overview:Formatting of this note might be different from the original.  
10/4/2011  
RV mild to moderate post pump  
goal CI>2.3  
   
   
                                Hypoxemia       10/04/2011      10/06/2011  
   
                                discharge planning 2011      10/19/2011  
   
                                                      
  
  
Overview:Formatting of this note might be different from the original.  
age 71,  lives in [place], [place]. No DC needs.  
/  
   
   
                                Pre-op testing  2011      10/04/2011  
   
                                                      
  
  
Overview:Formatting of this note is different from the original.  
Images from the original note were not included.  
[facility]  
PRE-OP CHECKLIST  
  
Surgeon: Deangelo Angulo M.D. Informed Consent Completed: No  
STS Score: 1.4%  
CAD: Yes - CAD on Problem List: Yes  
Is intended procedure a CABG: Yes - is a beta blocker ordered? Yes  
  
H & P completed: Yes  
  
PA/LAT: Completed CT: Completed MRI: N/A LE US: N/A  
  
Cath: Yes - reviewed: Yes Echo:Completed EKG: Completed EF %: 61  
  
PI's: N/A Carotid: Completed Mapping: N/A Dental: Completed PFT's: N/A  
  
Basename 11 0729  
WBC 7.18  
HB 13.1  
HCT 41.8  
  
INR 1.0  
CREAT 1.35  
  
UA: Normal  
HCG:N/A  
  
ABO/ABO Confirmed: Yes  
  
Blood ordered: No  
  
SA Swab: Yes - results: Pending  
  
Last Dose of Anticoagulation: vitamins  
  
Op Note: N/A  
  
Pacemaker Check: N/A  
  
Consults: GI 2001  
  
DM: No  
  
Cardiac Surgical prep: No  
  
SIGNATURE: Krystal Castellanos RN CHECKED BY:  
DATE of SERVICE: 2011  
TIME of SERVICE: 2:23 PM  
  
  
   
   
                                Anemia of chronic disease 2011  
   
                                Intestinal polyp 2011  
   
                                                      
  
  
Overview:Formatting of this note might be different from the original.  
Distal ileum ulcerated polyp.  
   
   
                                Nonspecific abnormal results of liver function s  
tudy 2006  
   
                                Impotence of organic origin 2006  
   
                                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  
                 2021  
  
documented as of this encounter (statuses as of 10/04/2022)  
[facility]- History of Past illness Narrative*   
  
                                Problem         Noted Date      Resolved Date  
   
                                Medicare annual wellness visit, subsequent 2019  
   
                                Encounter for long-term (current) use of medicat  
ions 2018  
   
                                                      
  
  
Overview:Formatting of this note might be different from the original.  
Added automatically from request for surgery [id number]  
   
   
                                History of colonic polyps 2018  
   
                                                      
  
  
Overview:Formatting of this note might be different from the original.  
Added automatically from request for surgery [id number]  
   
   
                                Gastroesophageal reflux disease 2018  
   
                                                      
  
  
Overview:Formatting of this note might be different from the original.  
Added automatically from request for surgery [id number]  
   
   
                                Acute pulmonary edema 10/05/2011      10/06/2011  
   
                                                      
  
  
Overview:Formatting of this note might be different from the original.  
10/5/2011  
cardiogenic and noncardiogenic factors  
  
   
   
                                Atelectasis     10/05/2011      10/06/2011  
   
                                Hypotension     10/04/2011      10/06/2011  
   
                                                      
  
  
Overview:Formatting of this note might be different from the original.  
10/4/2011  
goal map 65-80  
   
   
                                Stress hyperglycemia 10/04/2011      10/06/2011  
   
                                                      
  
  
Overview:Formatting of this note might be different from the original.  
10/4/2011  
Perioperative insulin resistance and exacerbation of hyperglycemia.  
Control blood glucose with titration of insulin infusion  
  
10/5/2011  
adequate control  
goal FBG<180  
  
   
   
                                Post-op pain    10/04/2011      10/06/2011  
   
                                Mechanically assisted ventilation 10/04/2011      
  10/05/2011  
   
                                                      
  
  
Overview:Formatting of this note might be different from the original.  
10/4/2011  
optimize MV settings, WTE  
current setting:FiO2, 75%, peep 8  
   
   
                                Cardiac insufficiency 10/04/2011      10/05/2011  
   
                                                      
  
  
Overview:Formatting of this note might be different from the original.  
10/4/2011  
RV mild to moderate post pump  
goal CI>2.3  
   
   
                                Hypoxemia       10/04/2011      10/06/2011  
   
                                discharge planning 2011      10/19/2011  
   
                                                      
  
  
Overview:Formatting of this note might be different from the original.  
age 71,  lives in [place], [place]. No DC needs.  
/  
   
   
                                Pre-op testing  2011      10/04/2011  
   
                                                      
  
  
Overview:Formatting of this note is different from the original.  
Images from the original note were not included.  
[facility]  
PRE-OP CHECKLIST  
  
Surgeon: Deangelo Angulo M.D. Informed Consent Completed: No  
STS Score: 1.4%  
CAD: Yes - CAD on Problem List: Yes  
Is intended procedure a CABG: Yes - is a beta blocker ordered? Yes  
  
H & P completed: Yes  
  
PA/LAT: Completed CT: Completed MRI: N/A LE US: N/A  
  
Cath: Yes - reviewed: Yes Echo:Completed EKG: Completed EF %: 61  
  
PI's: N/A Carotid: Completed Mapping: N/A Dental: Completed PFT's: N/A  
  
Basename 11 0729  
WBC 7.18  
HB 13.1  
HCT 41.8  
  
INR 1.0  
CREAT 1.35  
  
UA: Normal  
HCG:N/A  
  
ABO/ABO Confirmed: Yes  
  
Blood ordered: No  
  
SA Swab: Yes - results: Pending  
  
Last Dose of Anticoagulation: vitamins  
  
Op Note: N/A  
  
Pacemaker Check: N/A  
  
Consults: GI 2001  
  
DM: No  
  
Cardiac Surgical prep: No  
  
SIGNATURE: Krystal Castellanos RN CHECKED BY:  
DATE of SERVICE: 2011  
TIME of SERVICE: 2:23 PM  
  
  
   
   
                                Anemia of chronic disease 2011  
   
                                Intestinal polyp 2011  
   
                                                      
  
  
Overview:Formatting of this note might be different from the original.  
Distal ileum ulcerated polyp.  
   
   
                                Nonspecific abnormal results of liver function s  
tudy 2006  
   
                                Impotence of organic origin 2006  
   
                                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  
                 2021  
  
documented as of this encounter (statuses as of 10/05/2022)  
[facility]- History of Past illness Narrative*   
  
                                Problem         Noted Date      Resolved Date  
   
                                Medicare annual wellness visit, subsequent 2019  
   
                                Encounter for long-term (current) use of medicat  
ions 2018  
   
                                                      
  
  
Overview:Formatting of this note might be different from the original.  
Added automatically from request for surgery [id number]  
   
   
                                History of colonic polyps 2018  
   
                                                      
  
  
Overview:Formatting of this note might be different from the original.  
Added automatically from request for surgery [id number]  
   
   
                                Gastroesophageal reflux disease 2018  
   
                                                      
  
  
Overview:Formatting of this note might be different from the original.  
Added automatically from request for surgery [id number]  
   
   
                                Acute pulmonary edema 10/05/2011      10/06/2011  
   
                                                      
  
  
Overview:Formatting of this note might be different from the original.  
10/5/2011  
cardiogenic and noncardiogenic factors  
  
   
   
                                Atelectasis     10/05/2011      10/06/2011  
   
                                Hypotension     10/04/2011      10/06/2011  
   
                                                      
  
  
Overview:Formatting of this note might be different from the original.  
10/4/2011  
goal map 65-80  
   
   
                                Stress hyperglycemia 10/04/2011      10/06/2011  
   
                                                      
  
  
Overview:Formatting of this note might be different from the original.  
10/4/2011  
Perioperative insulin resistance and exacerbation of hyperglycemia.  
Control blood glucose with titration of insulin infusion  
  
10/5/2011  
adequate control  
goal FBG<180  
  
   
   
                                Post-op pain    10/04/2011      10/06/2011  
   
                                Mechanically assisted ventilation 10/04/2011      
  10/05/2011  
   
                                                      
  
  
Overview:Formatting of this note might be different from the original.  
10/4/2011  
optimize MV settings, WTE  
current setting:FiO2, 75%, peep 8  
   
   
                                Cardiac insufficiency 10/04/2011      10/05/2011  
   
                                                      
  
  
Overview:Formatting of this note might be different from the original.  
10/4/2011  
RV mild to moderate post pump  
goal CI>2.3  
   
   
                                Hypoxemia       10/04/2011      10/06/2011  
   
                                discharge planning 2011      10/19/2011  
   
                                                      
  
  
Overview:Formatting of this note might be different from the original.  
age 71,  lives in [place], [place]. No DC needs.  
/  
   
   
                                Pre-op testing  2011      10/04/2011  
   
                                                      
  
  
Overview:Formatting of this note is different from the original.  
Images from the original note were not included.  
[facility]  
PRE-OP CHECKLIST  
  
Surgeon: Deangelo Angulo M.D. Informed Consent Completed: No  
STS Score: 1.4%  
CAD: Yes - CAD on Problem List: Yes  
Is intended procedure a CABG: Yes - is a beta blocker ordered? Yes  
  
H & P completed: Yes  
  
PA/LAT: Completed CT: Completed MRI: N/A LE US: N/A  
  
Cath: Yes - reviewed: Yes Echo:Completed EKG: Completed EF %: 61  
  
PI's: N/A Carotid: Completed Mapping: N/A Dental: Completed PFT's: N/A  
  
Basename 11 0729  
WBC 7.18  
HB 13.1  
HCT 41.8  
  
INR 1.0  
CREAT 1.35  
  
UA: Normal  
HCG:N/A  
  
ABO/ABO Confirmed: Yes  
  
Blood ordered: No  
  
SA Swab: Yes - results: Pending  
  
Last Dose of Anticoagulation: vitamins  
  
Op Note: N/A  
  
Pacemaker Check: N/A  
  
Consults: GI 2001  
  
DM: No  
  
Cardiac Surgical prep: No  
  
SIGNATURE: Krystal Castellanos RN CHECKED BY:  
DATE of SERVICE: 2011  
TIME of SERVICE: 2:23 PM  
  
  
   
   
                                Anemia of chronic disease 2011  
   
                                Intestinal polyp 2011  
   
                                                      
  
  
Overview:Formatting of this note might be different from the original.  
Distal ileum ulcerated polyp.  
   
   
                                Nonspecific abnormal results of liver function s  
nialldy 2006  
   
                                Impotence of organic origin 2006  
   
                                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  
                 2021  
  
documented as of this encounter (statuses as of 10/07/2022)  
[facility]06- History of Past illness Narrative*   
  
                                Problem         Noted Date      Resolved Date  
   
                                Medicare annual wellness visit, subsequent 2019  
   
                                Encounter for long-term (current) use of medicat  
ions 2018  
   
                                                      
  
  
Overview:Formatting of this note might be different from the original.  
Added automatically from request for surgery [id number]  
   
   
                                History of colonic polyps 2018  
   
                                                      
  
  
Overview:Formatting of this note might be different from the original.  
Added automatically from request for surgery [id number]  
   
   
                                Gastroesophageal reflux disease 2018  
   
                                                      
  
  
Overview:Formatting of this note might be different from the original.  
Added automatically from request for surgery [id number]  
   
   
                                Acute pulmonary edema 10/05/2011      10/06/2011  
   
                                                      
  
  
Overview:Formatting of this note might be different from the original.  
10/5/2011  
cardiogenic and noncardiogenic factors  
  
   
   
                                Atelectasis     10/05/2011      10/06/2011  
   
                                Hypotension     10/04/2011      10/06/2011  
   
                                                      
  
  
Overview:Formatting of this note might be different from the original.  
10/4/2011  
goal map 65-80  
   
   
                                Stress hyperglycemia 10/04/2011      10/06/2011  
   
                                                      
  
  
Overview:Formatting of this note might be different from the original.  
10/4/2011  
Perioperative insulin resistance and exacerbation of hyperglycemia.  
Control blood glucose with titration of insulin infusion  
  
10/5/2011  
adequate control  
goal FBG<180  
  
   
   
                                Post-op pain    10/04/2011      10/06/2011  
   
                                Mechanically assisted ventilation 10/04/2011      
  10/05/2011  
   
                                                      
  
  
Overview:Formatting of this note might be different from the original.  
10/4/2011  
optimize MV settings, WTE  
current setting:FiO2, 75%, peep 8  
   
   
                                Cardiac insufficiency 10/04/2011      10/05/2011  
   
                                                      
  
  
Overview:Formatting of this note might be different from the original.  
10/4/2011  
RV mild to moderate post pump  
goal CI>2.3  
   
   
                                Hypoxemia       10/04/2011      10/06/2011  
   
                                discharge planning 2011      10/19/2011  
   
                                                      
  
  
Overview:Formatting of this note might be different from the original.  
age 71,  lives in [place], [place]. No DC needs.  
/  
   
   
                                Pre-op testing  2011      10/04/2011  
   
                                                      
  
  
Overview:Formatting of this note is different from the original.  
Images from the original note were not included.  
[facility]  
PRE-OP CHECKLIST  
  
Surgeon: Deangelo Angulo M.D. Informed Consent Completed: No  
STS Score: 1.4%  
CAD: Yes - CAD on Problem List: Yes  
Is intended procedure a CABG: Yes - is a beta blocker ordered? Yes  
  
H & P completed: Yes  
  
PA/LAT: Completed CT: Completed MRI: N/A LE US: N/A  
  
Cath: Yes - reviewed: Yes Echo:Completed EKG: Completed EF %: 61  
  
PI's: N/A Carotid: Completed Mapping: N/A Dental: Completed PFT's: N/A  
  
Basename 11 0729  
WBC 7.18  
HB 13.1  
HCT 41.8  
  
INR 1.0  
CREAT 1.35  
  
UA: Normal  
HCG:N/A  
  
ABO/ABO Confirmed: Yes  
  
Blood ordered: No  
  
SA Swab: Yes - results: Pending  
  
Last Dose of Anticoagulation: vitamins  
  
Op Note: N/A  
  
Pacemaker Check: N/A  
  
Consults: GI 2001  
  
DM: No  
  
Cardiac Surgical prep: No  
  
SIGNATURE: Krystal Castellanos RN CHECKED BY:  
DATE of SERVICE: 2011  
TIME of SERVICE: 2:23 PM  
  
  
   
   
                                Anemia of chronic disease 2011  
   
                                Intestinal polyp 2011  
   
                                                      
  
  
Overview:Formatting of this note might be different from the original.  
Distal ileum ulcerated polyp.  
   
   
                                Nonspecific abnormal results of liver function s  
tudy 2006  
   
                                Impotence of organic origin 2006  
   
                                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  
                 2021  
  
documented as of this encounter (statuses as of 10/10/2022)  
[facility]06- History of Past illness Narrative*   
  
                                Problem         Noted Date      Resolved Date  
   
                                Medicare annual wellness visit, subsequent 2019  
   
                                Encounter for long-term (current) use of medicat  
ions 2018  
   
                                                      
  
  
Overview:Formatting of this note might be different from the original.  
Added automatically from request for surgery [id number]  
   
   
                                History of colonic polyps 2018  
   
                                                      
  
  
Overview:Formatting of this note might be different from the original.  
Added automatically from request for surgery [id number]  
   
   
                                Gastroesophageal reflux disease 2018  
   
                                                      
  
  
Overview:Formatting of this note might be different from the original.  
Added automatically from request for surgery [id number]  
   
   
                                Acute pulmonary edema 10/05/2011      10/06/2011  
   
                                                      
  
  
Overview:Formatting of this note might be different from the original.  
10/5/2011  
cardiogenic and noncardiogenic factors  
  
   
   
                                Atelectasis     10/05/2011      10/06/2011  
   
                                Hypotension     10/04/2011      10/06/2011  
   
                                                      
  
  
Overview:Formatting of this note might be different from the original.  
10/4/2011  
goal map 65-80  
   
   
                                Stress hyperglycemia 10/04/2011      10/06/2011  
   
                                                      
  
  
Overview:Formatting of this note might be different from the original.  
10/4/2011  
Perioperative insulin resistance and exacerbation of hyperglycemia.  
Control blood glucose with titration of insulin infusion  
  
10/5/2011  
adequate control  
goal FBG<180  
  
   
   
                                Post-op pain    10/04/2011      10/06/2011  
   
                                Mechanically assisted ventilation 10/04/2011      
  10/05/2011  
   
                                                      
  
  
Overview:Formatting of this note might be different from the original.  
10/4/2011  
optimize MV settings, WTE  
current setting:FiO2, 75%, peep 8  
   
   
                                Cardiac insufficiency 10/04/2011      10/05/2011  
   
                                                      
  
  
Overview:Formatting of this note might be different from the original.  
10/4/2011  
RV mild to moderate post pump  
goal CI>2.3  
   
   
                                Hypoxemia       10/04/2011      10/06/2011  
   
                                discharge planning 2011      10/19/2011  
   
                                                      
  
  
Overview:Formatting of this note might be different from the original.  
age 71,  lives in [place], [place]. No DC needs.  
/  
   
   
                                Pre-op testing  2011      10/04/2011  
   
                                                      
  
  
Overview:Formatting of this note is different from the original.  
Images from the original note were not included.  
[facility]  
PRE-OP CHECKLIST  
  
Surgeon: Deangelo Angulo M.D. Informed Consent Completed: No  
STS Score: 1.4%  
CAD: Yes - CAD on Problem List: Yes  
Is intended procedure a CABG: Yes - is a beta blocker ordered? Yes  
  
H & P completed: Yes  
  
PA/LAT: Completed CT: Completed MRI: N/A LE US: N/A  
  
Cath: Yes - reviewed: Yes Echo:Completed EKG: Completed EF %: 61  
  
PI's: N/A Carotid: Completed Mapping: N/A Dental: Completed PFT's: N/A  
  
Basename 11 0729  
WBC 7.18  
HB 13.1  
HCT 41.8  
  
INR 1.0  
CREAT 1.35  
  
UA: Normal  
HCG:N/A  
  
ABO/ABO Confirmed: Yes  
  
Blood ordered: No  
  
SA Swab: Yes - results: Pending  
  
Last Dose of Anticoagulation: vitamins  
  
Op Note: N/A  
  
Pacemaker Check: N/A  
  
Consults: GI 2001  
  
DM: No  
  
Cardiac Surgical prep: No  
  
SIGNATURE: Krystal Castellanos RN CHECKED BY:  
DATE of SERVICE: 2011  
TIME of SERVICE: 2:23 PM  
  
  
   
   
                                Anemia of chronic disease 2011  
   
                                Intestinal polyp 2011  
   
                                                      
  
  
Overview:Formatting of this note might be different from the original.  
Distal ileum ulcerated polyp.  
   
   
                                Nonspecific abnormal results of liver function s  
tudy 2006  
   
                                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  
                 2021  
  
documented as of this encounter (statuses as of 10/10/2022)  
[facility]06- History of Past illness Narrative*   
  
                                Problem         Noted Date      Resolved Date  
   
                                Medicare annual wellness visit, subsequent 2019  
   
                                Encounter for long-term (current) use of medicat  
ions 2018  
   
                                                      
  
  
Overview:Formatting of this note might be different from the original.  
Added automatically from request for surgery [id number]  
   
   
                                History of colonic polyps 2018  
   
                                                      
  
  
Overview:Formatting of this note might be different from the original.  
Added automatically from request for surgery [id number]  
   
   
                                Gastroesophageal reflux disease 2018  
   
                                                      
  
  
Overview:Formatting of this note might be different from the original.  
Added automatically from request for surgery [id number]  
   
   
                                Acute pulmonary edema 10/05/2011      10/06/2011  
   
                                                      
  
  
Overview:Formatting of this note might be different from the original.  
10/5/2011  
cardiogenic and noncardiogenic factors  
  
   
   
                                Atelectasis     10/05/2011      10/06/2011  
   
                                Hypotension     10/04/2011      10/06/2011  
   
                                                      
  
  
Overview:Formatting of this note might be different from the original.  
10/4/2011  
goal map 65-80  
   
   
                                Stress hyperglycemia 10/04/2011      10/06/2011  
   
                                                      
  
  
Overview:Formatting of this note might be different from the original.  
10/4/2011  
Perioperative insulin resistance and exacerbation of hyperglycemia.  
Control blood glucose with titration of insulin infusion  
  
10/5/2011  
adequate control  
goal FBG<180  
  
   
   
                                Post-op pain    10/04/2011      10/06/2011  
   
                                Mechanically assisted ventilation 10/04/2011      
  10/05/2011  
   
                                                      
  
  
Overview:Formatting of this note might be different from the original.  
10/4/2011  
optimize MV settings, WTE  
current setting:FiO2, 75%, peep 8  
   
   
                                Cardiac insufficiency 10/04/2011      10/05/2011  
   
                                                      
  
  
Overview:Formatting of this note might be different from the original.  
10/4/2011  
RV mild to moderate post pump  
goal CI>2.3  
   
   
                                Hypoxemia       10/04/2011      10/06/2011  
   
                                discharge planning 2011      10/19/2011  
   
                                                      
  
  
Overview:Formatting of this note might be different from the original.  
age 71,  lives in [place], [place]. No DC needs.  
/  
   
   
                                Pre-op testing  2011      10/04/2011  
   
                                                      
  
  
Overview:Formatting of this note is different from the original.  
Images from the original note were not included.  
[facility]  
PRE-OP CHECKLIST  
  
Surgeon: Deangelo Angulo M.D. Informed Consent Completed: No  
STS Score: 1.4%  
CAD: Yes - CAD on Problem List: Yes  
Is intended procedure a CABG: Yes - is a beta blocker ordered? Yes  
  
H & P completed: Yes  
  
PA/LAT: Completed CT: Completed MRI: N/A LE US: N/A  
  
Cath: Yes - reviewed: Yes Echo:Completed EKG: Completed EF %: 61  
  
PI's: N/A Carotid: Completed Mapping: N/A Dental: Completed PFT's: N/A  
  
Basename 11 0729  
WBC 7.18  
HB 13.1  
HCT 41.8  
  
INR 1.0  
CREAT 1.35  
  
UA: Normal  
HCG:N/A  
  
ABO/ABO Confirmed: Yes  
  
Blood ordered: No  
  
SA Swab: Yes - results: Pending  
  
Last Dose of Anticoagulation: vitamins  
  
Op Note: N/A  
  
Pacemaker Check: N/A  
  
Consults: GI 2001  
  
DM: No  
  
Cardiac Surgical prep: No  
  
SIGNATURE: Krystal Castellanos RN CHECKED BY:  
DATE of SERVICE: 2011  
TIME of SERVICE: 2:23 PM  
  
  
   
   
                                Anemia of chronic disease 2011  
   
                                Intestinal polyp 2011  
   
                                                      
  
  
Overview:Formatting of this note might be different from the original.  
Distal ileum ulcerated polyp.  
   
   
                                Nonspecific abnormal results of liver function s  
nialldy 2006  
   
                                Impotence of organic origin 2006      09/0  
2015  
   
                                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  
                 2021  
  
documented as of this encounter (statuses as of 10/12/2022)  
[facility]- History of Past illness Narrative*   
  
                                Problem         Noted Date      Resolved Date  
   
                                Medicare annual wellness visit, subsequent 2019  
   
                                Encounter for long-term (current) use of medicat  
ions 2018  
   
                                                      
  
  
Overview:Formatting of this note might be different from the original.  
Added automatically from request for surgery [id number]  
   
   
                                History of colonic polyps 2018  
   
                                                      
  
  
Overview:Formatting of this note might be different from the original.  
Added automatically from request for surgery [id number]  
   
   
                                Gastroesophageal reflux disease 2018  
   
                                                      
  
  
Overview:Formatting of this note might be different from the original.  
Added automatically from request for surgery [id number]  
   
   
                                Acute pulmonary edema 10/05/2011      10/06/2011  
   
                                                      
  
  
Overview:Formatting of this note might be different from the original.  
10/5/2011  
cardiogenic and noncardiogenic factors  
  
   
   
                                Atelectasis     10/05/2011      10/06/2011  
   
                                Hypotension     10/04/2011      10/06/2011  
   
                                                      
  
  
Overview:Formatting of this note might be different from the original.  
10/4/2011  
goal map 65-80  
   
   
                                Stress hyperglycemia 10/04/2011      10/06/2011  
   
                                                      
  
  
Overview:Formatting of this note might be different from the original.  
10/4/2011  
Perioperative insulin resistance and exacerbation of hyperglycemia.  
Control blood glucose with titration of insulin infusion  
  
10/5/2011  
adequate control  
goal FBG<180  
  
   
   
                                Post-op pain    10/04/2011      10/06/2011  
   
                                Mechanically assisted ventilation 10/04/2011      
  10/05/2011  
   
                                                      
  
  
Overview:Formatting of this note might be different from the original.  
10/4/2011  
optimize MV settings, WTE  
current setting:FiO2, 75%, peep 8  
   
   
                                Cardiac insufficiency 10/04/2011      10/05/2011  
   
                                                      
  
  
Overview:Formatting of this note might be different from the original.  
10/4/2011  
RV mild to moderate post pump  
goal CI>2.3  
   
   
                                Hypoxemia       10/04/2011      10/06/2011  
   
                                discharge planning 2011      10/19/2011  
   
                                                      
  
  
Overview:Formatting of this note might be different from the original.  
age 71,  lives in [place], [place]. No DC needs.  
/  
   
   
                                Pre-op testing  2011      10/04/2011  
   
                                                      
  
  
Overview:Formatting of this note is different from the original.  
Images from the original note were not included.  
[facility]  
PRE-OP CHECKLIST  
  
Surgeon: Deangelo Angulo M.D. Informed Consent Completed: No  
STS Score: 1.4%  
CAD: Yes - CAD on Problem List: Yes  
Is intended procedure a CABG: Yes - is a beta blocker ordered? Yes  
  
H & P completed: Yes  
  
PA/LAT: Completed CT: Completed MRI: N/A LE US: N/A  
  
Cath: Yes - reviewed: Yes Echo:Completed EKG: Completed EF %: 61  
  
PI's: N/A Carotid: Completed Mapping: N/A Dental: Completed PFT's: N/A  
  
Basename 11 0729  
WBC 7.18  
HB 13.1  
HCT 41.8  
  
INR 1.0  
CREAT 1.35  
  
UA: Normal  
HCG:N/A  
  
ABO/ABO Confirmed: Yes  
  
Blood ordered: No  
  
SA Swab: Yes - results: Pending  
  
Last Dose of Anticoagulation: vitamins  
  
Op Note: N/A  
  
Pacemaker Check: N/A  
  
Consults: GI 2001  
  
DM: No  
  
Cardiac Surgical prep: No  
  
SIGNATURE: Krystal Castellanos RN CHECKED BY:  
DATE of SERVICE: 2011  
TIME of SERVICE: 2:23 PM  
  
  
   
   
                                Anemia of chronic disease 2011  
   
                                Intestinal polyp 2011  
   
                                                      
  
  
Overview:Formatting of this note might be different from the original.  
Distal ileum ulcerated polyp.  
   
   
                                Nonspecific abnormal results of liver function s  
nialldy 2006  
   
                                Impotence of organic origin 2006  
   
                                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  
                 2021  
  
documented as of this encounter (statuses as of 10/17/2022)  
[facility]06- History of Past illness Narrative*   
  
                                Problem         Noted Date      Resolved Date  
   
                                Medicare annual wellness visit, subsequent 2019  
   
                                Encounter for long-term (current) use of medicat  
ions 2018  
   
                                                      
  
  
Overview:Formatting of this note might be different from the original.  
Added automatically from request for surgery [id number]  
   
   
                                History of colonic polyps 2018  
   
                                                      
  
  
Overview:Formatting of this note might be different from the original.  
Added automatically from request for surgery [id number]  
   
   
                                Gastroesophageal reflux disease 2018  
   
                                                      
  
  
Overview:Formatting of this note might be different from the original.  
Added automatically from request for surgery [id number]  
   
   
                                Acute pulmonary edema 10/05/2011      10/06/2011  
   
                                                      
  
  
Overview:Formatting of this note might be different from the original.  
10/5/2011  
cardiogenic and noncardiogenic factors  
  
   
   
                                Atelectasis     10/05/2011      10/06/2011  
   
                                Hypotension     10/04/2011      10/06/2011  
   
                                                      
  
  
Overview:Formatting of this note might be different from the original.  
10/4/2011  
goal map 65-80  
   
   
                                Stress hyperglycemia 10/04/2011      10/06/2011  
   
                                                      
  
  
Overview:Formatting of this note might be different from the original.  
10/4/2011  
Perioperative insulin resistance and exacerbation of hyperglycemia.  
Control blood glucose with titration of insulin infusion  
  
10/5/2011  
adequate control  
goal FBG<180  
  
   
   
                                Post-op pain    10/04/2011      10/06/2011  
   
                                Mechanically assisted ventilation 10/04/2011      
  10/05/2011  
   
                                                      
  
  
Overview:Formatting of this note might be different from the original.  
10/4/2011  
optimize MV settings, WTE  
current setting:FiO2, 75%, peep 8  
   
   
                                Cardiac insufficiency 10/04/2011      10/05/2011  
   
                                                      
  
  
Overview:Formatting of this note might be different from the original.  
10/4/2011  
RV mild to moderate post pump  
goal CI>2.3  
   
   
                                Hypoxemia       10/04/2011      10/06/2011  
   
                                discharge planning 2011      10/19/2011  
   
                                                      
  
  
Overview:Formatting of this note might be different from the original.  
age 71,  lives in [place], [place]. No DC needs.  
/  
   
   
                                Pre-op testing  2011      10/04/2011  
   
                                                      
  
  
Overview:Formatting of this note is different from the original.  
Images from the original note were not included.  
[facility]  
PRE-OP CHECKLIST  
  
Surgeon: Deangelo Angulo M.D. Informed Consent Completed: No  
STS Score: 1.4%  
CAD: Yes - CAD on Problem List: Yes  
Is intended procedure a CABG: Yes - is a beta blocker ordered? Yes  
  
H & P completed: Yes  
  
PA/LAT: Completed CT: Completed MRI: N/A LE US: N/A  
  
Cath: Yes - reviewed: Yes Echo:Completed EKG: Completed EF %: 61  
  
PI's: N/A Carotid: Completed Mapping: N/A Dental: Completed PFT's: N/A  
  
Basename 11 0729  
WBC 7.18  
HB 13.1  
HCT 41.8  
  
INR 1.0  
CREAT 1.35  
  
UA: Normal  
HCG:N/A  
  
ABO/ABO Confirmed: Yes  
  
Blood ordered: No  
  
SA Swab: Yes - results: Pending  
  
Last Dose of Anticoagulation: vitamins  
  
Op Note: N/A  
  
Pacemaker Check: N/A  
  
Consults: GI 2001  
  
DM: No  
  
Cardiac Surgical prep: No  
  
SIGNATURE: Krystal Castellanos RN CHECKED BY:  
DATE of SERVICE: 2011  
TIME of SERVICE: 2:23 PM  
  
  
   
   
                                Anemia of chronic disease 2011  
   
                                Intestinal polyp 2011  
   
                                                      
  
  
Overview:Formatting of this note might be different from the original.  
Distal ileum ulcerated polyp.  
   
   
                                Nonspecific abnormal results of liver function s  
tudy 2006  
   
                                Impotence of organic origin 2006  
   
                                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  
                 2021  
  
documented as of this encounter (statuses as of 2022)  
[facility]06- History of Past illness Narrative*   
  
                                Problem         Noted Date      Resolved Date  
   
                                Medicare annual wellness visit, subsequent 2019  
   
                                Encounter for long-term (current) use of medicat  
ions 2018  
   
                                                      
  
  
Overview:Formatting of this note might be different from the original.  
Added automatically from request for surgery [id number]  
   
   
                                History of colonic polyps 2018  
   
                                                      
  
  
Overview:Formatting of this note might be different from the original.  
Added automatically from request for surgery [id number]  
   
   
                                Gastroesophageal reflux disease 2018  
   
                                                      
  
  
Overview:Formatting of this note might be different from the original.  
Added automatically from request for surgery [id number]  
   
   
                                Acute pulmonary edema 10/05/2011      10/06/2011  
   
                                                      
  
  
Overview:Formatting of this note might be different from the original.  
10/5/2011  
cardiogenic and noncardiogenic factors  
  
   
   
                                Atelectasis     10/05/2011      10/06/2011  
   
                                Hypotension     10/04/2011      10/06/2011  
   
                                                      
  
  
Overview:Formatting of this note might be different from the original.  
10/4/2011  
goal map 65-80  
   
   
                                Stress hyperglycemia 10/04/2011      10/06/2011  
   
                                                      
  
  
Overview:Formatting of this note might be different from the original.  
10/4/2011  
Perioperative insulin resistance and exacerbation of hyperglycemia.  
Control blood glucose with titration of insulin infusion  
  
10/5/2011  
adequate control  
goal FBG<180  
  
   
   
                                Post-op pain    10/04/2011      10/06/2011  
   
                                Mechanically assisted ventilation 10/04/2011      
  10/05/2011  
   
                                                      
  
  
Overview:Formatting of this note might be different from the original.  
10/4/2011  
optimize MV settings, WTE  
current setting:FiO2, 75%, peep 8  
   
   
                                Cardiac insufficiency 10/04/2011      10/05/2011  
   
                                                      
  
  
Overview:Formatting of this note might be different from the original.  
10/4/2011  
RV mild to moderate post pump  
goal CI>2.3  
   
   
                                Hypoxemia       10/04/2011      10/06/2011  
   
                                discharge planning 2011      10/19/2011  
   
                                                      
  
  
Overview:Formatting of this note might be different from the original.  
age 71,  lives in [place], [place]. No DC needs.  
/  
   
   
                                Pre-op testing  2011      10/04/2011  
   
                                                      
  
  
Overview:Formatting of this note is different from the original.  
Images from the original note were not included.  
[facility]  
PRE-OP CHECKLIST  
  
Surgeon: Deangelo Angulo M.D. Informed Consent Completed: No  
STS Score: 1.4%  
CAD: Yes - CAD on Problem List: Yes  
Is intended procedure a CABG: Yes - is a beta blocker ordered? Yes  
  
H & P completed: Yes  
  
PA/LAT: Completed CT: Completed MRI: N/A LE US: N/A  
  
Cath: Yes - reviewed: Yes Echo:Completed EKG: Completed EF %: 61  
  
PI's: N/A Carotid: Completed Mapping: N/A Dental: Completed PFT's: N/A  
  
Basename 11 0729  
WBC 7.18  
HB 13.1  
HCT 41.8  
  
INR 1.0  
CREAT 1.35  
  
UA: Normal  
HCG:N/A  
  
ABO/ABO Confirmed: Yes  
  
Blood ordered: No  
  
SA Swab: Yes - results: Pending  
  
Last Dose of Anticoagulation: vitamins  
  
Op Note: N/A  
  
Pacemaker Check: N/A  
  
Consults: GI 2001  
  
DM: No  
  
Cardiac Surgical prep: No  
  
SIGNATURE: Krystal Castellanos RN CHECKED BY:  
DATE of SERVICE: 2011  
TIME of SERVICE: 2:23 PM  
  
  
   
   
                                Anemia of chronic disease 2011  
   
                                Intestinal polyp 2011  
   
                                                      
  
  
Overview:Formatting of this note might be different from the original.  
Distal ileum ulcerated polyp.  
   
   
                                Nonspecific abnormal results of liver function s  
tudy 2006  
   
                                Impotence of organic origin 2006  
   
                                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  
                 2021  
  
documented as of this encounter (statuses as of 2022)  
[facility]06- History of Past illness Narrative*   
  
                                Problem         Noted Date      Resolved Date  
   
                                Medicare annual wellness visit, subsequent 2019  
   
                                Encounter for long-term (current) use of medicat  
ions 2018  
   
                                                      
  
  
Overview:Formatting of this note might be different from the original.  
Added automatically from request for surgery [id number]  
   
   
                                History of colonic polyps 2018  
   
                                                      
  
  
Overview:Formatting of this note might be different from the original.  
Added automatically from request for surgery [id number]  
   
   
                                Gastroesophageal reflux disease 2018  
   
                                                      
  
  
Overview:Formatting of this note might be different from the original.  
Added automatically from request for surgery [id number]  
   
   
                                Acute pulmonary edema 10/05/2011      10/06/2011  
   
                                                      
  
  
Overview:Formatting of this note might be different from the original.  
10/5/2011  
cardiogenic and noncardiogenic factors  
  
   
   
                                Atelectasis     10/05/2011      10/06/2011  
   
                                Hypotension     10/04/2011      10/06/2011  
   
                                                      
  
  
Overview:Formatting of this note might be different from the original.  
10/4/2011  
goal map 65-80  
   
   
                                Stress hyperglycemia 10/04/2011      10/06/2011  
   
                                                      
  
  
Overview:Formatting of this note might be different from the original.  
10/4/2011  
Perioperative insulin resistance and exacerbation of hyperglycemia.  
Control blood glucose with titration of insulin infusion  
  
10/5/2011  
adequate control  
goal FBG<180  
  
   
   
                                Post-op pain    10/04/2011      10/06/2011  
   
                                Mechanically assisted ventilation 10/04/2011      
  10/05/2011  
   
                                                      
  
  
Overview:Formatting of this note might be different from the original.  
10/4/2011  
optimize MV settings, WTE  
current setting:FiO2, 75%, peep 8  
   
   
                                Cardiac insufficiency 10/04/2011      10/05/2011  
   
                                                      
  
  
Overview:Formatting of this note might be different from the original.  
10/4/2011  
RV mild to moderate post pump  
goal CI>2.3  
   
   
                                Hypoxemia       10/04/2011      10/06/2011  
   
                                discharge planning 2011      10/19/2011  
   
                                                      
  
  
Overview:Formatting of this note might be different from the original.  
age 71,  lives in [place], [place]. No DC needs.  
/  
   
   
                                Pre-op testing  2011      10/04/2011  
   
                                                      
  
  
Overview:Formatting of this note is different from the original.  
Images from the original note were not included.  
[facility]  
PRE-OP CHECKLIST  
  
Surgeon: Deangelo Angulo M.D. Informed Consent Completed: No  
STS Score: 1.4%  
CAD: Yes - CAD on Problem List: Yes  
Is intended procedure a CABG: Yes - is a beta blocker ordered? Yes  
  
H & P completed: Yes  
  
PA/LAT: Completed CT: Completed MRI: N/A LE US: N/A  
  
Cath: Yes - reviewed: Yes Echo:Completed EKG: Completed EF %: 61  
  
PI's: N/A Carotid: Completed Mapping: N/A Dental: Completed PFT's: N/A  
  
Basename 11 0729  
WBC 7.18  
HB 13.1  
HCT 41.8  
  
INR 1.0  
CREAT 1.35  
  
UA: Normal  
HCG:N/A  
  
ABO/ABO Confirmed: Yes  
  
Blood ordered: No  
  
SA Swab: Yes - results: Pending  
  
Last Dose of Anticoagulation: vitamins  
  
Op Note: N/A  
  
Pacemaker Check: N/A  
  
Consults: GI 2001  
  
DM: No  
  
Cardiac Surgical prep: No  
  
SIGNATURE: Krystal Castellanos RN CHECKED BY:  
DATE of SERVICE: 2011  
TIME of SERVICE: 2:23 PM  
  
  
   
   
                                Anemia of chronic disease 2011  
   
                                Intestinal polyp 2011  
   
                                                      
  
  
Overview:Formatting of this note might be different from the original.  
Distal ileum ulcerated polyp.  
   
   
                                Nonspecific abnormal results of liver function s  
tudy 2006  
   
                                Impotence of organic origin 2006  
   
                                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  
                 2021  
  
documented as of this encounter (statuses as of 2022)  
[facility]06- History of Past illness Narrative*   
  
                                Problem         Noted Date      Resolved Date  
   
                                Medicare annual wellness visit, subsequent 2019  
   
                                Encounter for long-term (current) use of medicat  
ions 2018  
   
                                                      
  
  
Overview:Formatting of this note might be different from the original.  
Added automatically from request for surgery [id number]  
   
   
                                History of colonic polyps 2018  
   
                                                      
  
  
Overview:Formatting of this note might be different from the original.  
Added automatically from request for surgery [id number]  
   
   
                                Gastroesophageal reflux disease 2018  
   
                                                      
  
  
Overview:Formatting of this note might be different from the original.  
Added automatically from request for surgery [id number]  
   
   
                                Acute pulmonary edema 10/05/2011      10/06/2011  
   
                                                      
  
  
Overview:Formatting of this note might be different from the original.  
10/5/2011  
cardiogenic and noncardiogenic factors  
  
   
   
                                Atelectasis     10/05/2011      10/06/2011  
   
                                Hypotension     10/04/2011      10/06/2011  
   
                                                      
  
  
Overview:Formatting of this note might be different from the original.  
10/4/2011  
goal map 65-80  
   
   
                                Stress hyperglycemia 10/04/2011      10/06/2011  
   
                                                      
  
  
Overview:Formatting of this note might be different from the original.  
10/4/2011  
Perioperative insulin resistance and exacerbation of hyperglycemia.  
Control blood glucose with titration of insulin infusion  
  
10/5/2011  
adequate control  
goal FBG<180  
  
   
   
                                Post-op pain    10/04/2011      10/06/2011  
   
                                Mechanically assisted ventilation 10/04/2011      
  10/05/2011  
   
                                                      
  
  
Overview:Formatting of this note might be different from the original.  
10/4/2011  
optimize MV settings, WTE  
current setting:FiO2, 75%, peep 8  
   
   
                                Cardiac insufficiency 10/04/2011      10/05/2011  
   
                                                      
  
  
Overview:Formatting of this note might be different from the original.  
10/4/2011  
RV mild to moderate post pump  
goal CI>2.3  
   
   
                                Hypoxemia       10/04/2011      10/06/2011  
   
                                discharge planning 2011      10/19/2011  
   
                                                      
  
  
Overview:Formatting of this note might be different from the original.  
age 71,  lives in [place], [place]. No DC needs.  
/  
   
   
                                Pre-op testing  2011      10/04/2011  
   
                                                      
  
  
Overview:Formatting of this note is different from the original.  
Images from the original note were not included.  
[facility]  
PRE-OP CHECKLIST  
  
Surgeon: Deangelo R. Adele, M.D. Informed Consent Completed: No  
STS Score: 1.4%  
CAD: Yes - CAD on Problem List: Yes  
Is intended procedure a CABG: Yes - is a beta blocker ordered? Yes  
  
H & P completed: Yes  
  
PA/LAT: Completed CT: Completed MRI: N/A LE US: N/A  
  
Cath: Yes - reviewed: Yes Echo:Completed EKG: Completed EF %: 61  
  
PI's: N/A Carotid: Completed Mapping: N/A Dental: Completed PFT's: N/A  
  
Basename 11 0729  
WBC 7.18  
HB 13.1  
HCT 41.8  
  
INR 1.0  
CREAT 1.35  
  
UA: Normal  
HCG:N/A  
  
ABO/ABO Confirmed: Yes  
  
Blood ordered: No  
  
SA Swab: Yes - results: Pending  
  
Last Dose of Anticoagulation: vitamins  
  
Op Note: N/A  
  
Pacemaker Check: N/A  
  
Consults: GI 2001  
  
DM: No  
  
Cardiac Surgical prep: No  
  
SIGNATURE: Krystal Castellanos RN CHECKED BY:  
DATE of SERVICE: 2011  
TIME of SERVICE: 2:23 PM  
  
  
   
   
                                Anemia of chronic disease 2011  
   
                                Intestinal polyp 2011  
   
                                                      
  
  
Overview:Formatting of this note might be different from the original.  
Distal ileum ulcerated polyp.  
   
   
                                Nonspecific abnormal results of liver function s  
tudy 2006  
   
                                Impotence of organic origin 2006  
   
                                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  
                 2021  
  
documented as of this encounter (statuses as of 2022)  
[facility]06- History of Past illness Narrative*   
  
                                Problem         Noted Date      Resolved Date  
   
                                Medicare annual wellness visit, subsequent 2019  
   
                                Encounter for long-term (current) use of medicat  
ions 2018  
   
                                                      
  
  
Overview:Formatting of this note might be different from the original.  
Added automatically from request for surgery [id number]  
   
   
                                History of colonic polyps 2018  
   
                                                      
  
  
Overview:Formatting of this note might be different from the original.  
Added automatically from request for surgery [id number]  
   
   
                                Gastroesophageal reflux disease 2018  
   
                                                      
  
  
Overview:Formatting of this note might be different from the original.  
Added automatically from request for surgery [id number]  
   
   
                                Acute pulmonary edema 10/05/2011      10/06/2011  
   
                                                      
  
  
Overview:Formatting of this note might be different from the original.  
10/5/2011  
cardiogenic and noncardiogenic factors  
  
   
   
                                Atelectasis     10/05/2011      10/06/2011  
   
                                Hypotension     10/04/2011      10/06/2011  
   
                                                      
  
  
Overview:Formatting of this note might be different from the original.  
10/4/2011  
goal map 65-80  
   
   
                                Stress hyperglycemia 10/04/2011      10/06/2011  
   
                                                      
  
  
Overview:Formatting of this note might be different from the original.  
10/4/2011  
Perioperative insulin resistance and exacerbation of hyperglycemia.  
Control blood glucose with titration of insulin infusion  
  
10/5/2011  
adequate control  
goal FBG<180  
  
   
   
                                Post-op pain    10/04/2011      10/06/2011  
   
                                Mechanically assisted ventilation 10/04/2011      
  10/05/2011  
   
                                                      
  
  
Overview:Formatting of this note might be different from the original.  
10/4/2011  
optimize MV settings, WTE  
current setting:FiO2, 75%, peep 8  
   
   
                                Cardiac insufficiency 10/04/2011      10/05/2011  
   
                                                      
  
  
Overview:Formatting of this note might be different from the original.  
10/4/2011  
RV mild to moderate post pump  
goal CI>2.3  
   
   
                                Hypoxemia       10/04/2011      10/06/2011  
   
                                discharge planning 2011      10/19/2011  
   
                                                      
  
  
Overview:Formatting of this note might be different from the original.  
age 71,  lives in [place], [place]. No DC needs.  
/  
   
   
                                Pre-op testing  2011      10/04/2011  
   
                                                      
  
  
Overview:Formatting of this note is different from the original.  
Images from the original note were not included.  
[facility]  
PRE-OP CHECKLIST  
  
Surgeon: Deangelo Angulo M.D. Informed Consent Completed: No  
STS Score: 1.4%  
CAD: Yes - CAD on Problem List: Yes  
Is intended procedure a CABG: Yes - is a beta blocker ordered? Yes  
  
H & P completed: Yes  
  
PA/LAT: Completed CT: Completed MRI: N/A LE US: N/A  
  
Cath: Yes - reviewed: Yes Echo:Completed EKG: Completed EF %: 61  
  
PI's: N/A Carotid: Completed Mapping: N/A Dental: Completed PFT's: N/A  
  
Basename 11 0729  
WBC 7.18  
HB 13.1  
HCT 41.8  
  
INR 1.0  
CREAT 1.35  
  
UA: Normal  
HCG:N/A  
  
ABO/ABO Confirmed: Yes  
  
Blood ordered: No  
  
SA Swab: Yes - results: Pending  
  
Last Dose of Anticoagulation: vitamins  
  
Op Note: N/A  
  
Pacemaker Check: N/A  
  
Consults: GI 2001  
  
DM: No  
  
Cardiac Surgical prep: No  
  
SIGNATURE: Krystal Castellanos RN CHECKED BY:  
DATE of SERVICE: 2011  
TIME of SERVICE: 2:23 PM  
  
  
   
   
                                Anemia of chronic disease 2011  
   
                                Intestinal polyp 2011  
   
                                                      
  
  
Overview:Formatting of this note might be different from the original.  
Distal ileum ulcerated polyp.  
   
   
                                Nonspecific abnormal results of liver function s  
tudy 2006  
   
                                Impotence of organic origin 2006  
   
                                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  
                 2021  
  
documented as of this encounter (statuses as of 2022)  
[facility]06- History of Past illness Narrative*   
  
                                Problem         Noted Date      Resolved Date  
   
                                Medicare annual wellness visit, subsequent 2019  
   
                                Encounter for long-term (current) use of medicat  
ions 2018  
   
                                                      
  
  
Overview:Formatting of this note might be different from the original.  
Added automatically from request for surgery [id number]  
   
   
                                History of colonic polyps 2018  
   
                                                      
  
  
Overview:Formatting of this note might be different from the original.  
Added automatically from request for surgery [id number]  
   
   
                                Gastroesophageal reflux disease 2018  
   
                                                      
  
  
Overview:Formatting of this note might be different from the original.  
Added automatically from request for surgery [id number]  
   
   
                                Acute pulmonary edema 10/05/2011      10/06/2011  
   
                                                      
  
  
Overview:Formatting of this note might be different from the original.  
10/5/2011  
cardiogenic and noncardiogenic factors  
  
   
   
                                Atelectasis     10/05/2011      10/06/2011  
   
                                Hypotension     10/04/2011      10/06/2011  
   
                                                      
  
  
Overview:Formatting of this note might be different from the original.  
10/4/2011  
goal map 65-80  
   
   
                                Stress hyperglycemia 10/04/2011      10/06/2011  
   
                                                      
  
  
Overview:Formatting of this note might be different from the original.  
10/4/2011  
Perioperative insulin resistance and exacerbation of hyperglycemia.  
Control blood glucose with titration of insulin infusion  
  
10/5/2011  
adequate control  
goal FBG<180  
  
   
   
                                Post-op pain    10/04/2011      10/06/2011  
   
                                Mechanically assisted ventilation 10/04/2011      
  10/05/2011  
   
                                                      
  
  
Overview:Formatting of this note might be different from the original.  
10/4/2011  
optimize MV settings, WTE  
current setting:FiO2, 75%, peep 8  
   
   
                                Cardiac insufficiency 10/04/2011      10/05/2011  
   
                                                      
  
  
Overview:Formatting of this note might be different from the original.  
10/4/2011  
RV mild to moderate post pump  
goal CI>2.3  
   
   
                                Hypoxemia       10/04/2011      10/06/2011  
   
                                discharge planning 2011      10/19/2011  
   
                                                      
  
  
Overview:Formatting of this note might be different from the original.  
age 71,  lives in [place], [place]. No DC needs.  
/  
   
   
                                Pre-op testing  2011      10/04/2011  
   
                                                      
  
  
Overview:Formatting of this note is different from the original.  
Images from the original note were not included.  
[facility]  
PRE-OP CHECKLIST  
  
Surgeon: Deangelo Angulo M.D. Informed Consent Completed: No  
STS Score: 1.4%  
CAD: Yes - CAD on Problem List: Yes  
Is intended procedure a CABG: Yes - is a beta blocker ordered? Yes  
  
H & P completed: Yes  
  
PA/LAT: Completed CT: Completed MRI: N/A LE US: N/A  
  
Cath: Yes - reviewed: Yes Echo:Completed EKG: Completed EF %: 61  
  
PI's: N/A Carotid: Completed Mapping: N/A Dental: Completed PFT's: N/A  
  
Basename 11 0729  
WBC 7.18  
HB 13.1  
HCT 41.8  
  
INR 1.0  
CREAT 1.35  
  
UA: Normal  
HCG:N/A  
  
ABO/ABO Confirmed: Yes  
  
Blood ordered: No  
  
SA Swab: Yes - results: Pending  
  
Last Dose of Anticoagulation: vitamins  
  
Op Note: N/A  
  
Pacemaker Check: N/A  
  
Consults: GI 2001  
  
DM: No  
  
Cardiac Surgical prep: No  
  
SIGNATURE: Krystal Castellanos RN CHECKED BY:  
DATE of SERVICE: 2011  
TIME of SERVICE: 2:23 PM  
  
  
   
   
                                Anemia of chronic disease 2011  
   
                                Intestinal polyp 2011  
   
                                                      
  
  
Overview:Formatting of this note might be different from the original.  
Distal ileum ulcerated polyp.  
   
   
                                Nonspecific abnormal results of liver function s  
tudy 2006  
   
                                Impotence of organic origin 2006  
   
                                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  
                 2021  
  
documented as of this encounter (statuses as of 2023)  
[facility]06- History of Past illness Narrative*   
  
                                Problem         Noted Date      Resolved Date  
   
                                Medicare annual wellness visit, subsequent 2019  
   
                                Encounter for long-term (current) use of medicat  
ions 2018  
   
                                                      
  
  
Overview:Formatting of this note might be different from the original.  
Added automatically from request for surgery [id number]  
   
   
                                History of colonic polyps 2018  
   
                                                      
  
  
Overview:Formatting of this note might be different from the original.  
Added automatically from request for surgery [id number]  
   
   
                                Gastroesophageal reflux disease 2018  
   
                                                      
  
  
Overview:Formatting of this note might be different from the original.  
Added automatically from request for surgery [id number]  
   
   
                                Acute pulmonary edema 10/05/2011      10/06/2011  
   
                                                      
  
  
Overview:Formatting of this note might be different from the original.  
10/5/2011  
cardiogenic and noncardiogenic factors  
  
   
   
                                Atelectasis     10/05/2011      10/06/2011  
   
                                Hypotension     10/04/2011      10/06/2011  
   
                                                      
  
  
Overview:Formatting of this note might be different from the original.  
10/4/2011  
goal map 65-80  
   
   
                                Stress hyperglycemia 10/04/2011      10/06/2011  
   
                                                      
  
  
Overview:Formatting of this note might be different from the original.  
10/4/2011  
Perioperative insulin resistance and exacerbation of hyperglycemia.  
Control blood glucose with titration of insulin infusion  
  
10/5/2011  
adequate control  
goal FBG<180  
  
   
   
                                Post-op pain    10/04/2011      10/06/2011  
   
                                Mechanically assisted ventilation 10/04/2011      
  10/05/2011  
   
                                                      
  
  
Overview:Formatting of this note might be different from the original.  
10/4/2011  
optimize MV settings, WTE  
current setting:FiO2, 75%, peep 8  
   
   
                                Cardiac insufficiency 10/04/2011      10/05/2011  
   
                                                      
  
  
Overview:Formatting of this note might be different from the original.  
10/4/2011  
RV mild to moderate post pump  
goal CI>2.3  
   
   
                                Hypoxemia       10/04/2011      10/06/2011  
   
                                discharge planning 2011      10/19/2011  
   
                                                      
  
  
Overview:Formatting of this note might be different from the original.  
age 71,  lives in [place], [place]. No DC needs.  
/  
   
   
                                Pre-op testing  2011      10/04/2011  
   
                                                      
  
  
Overview:Formatting of this note is different from the original.  
Images from the original note were not included.  
[facility]  
PRE-OP CHECKLIST  
  
Surgeon: Deangelo Angulo M.D. Informed Consent Completed: No  
STS Score: 1.4%  
CAD: Yes - CAD on Problem List: Yes  
Is intended procedure a CABG: Yes - is a beta blocker ordered? Yes  
  
H & P completed: Yes  
  
PA/LAT: Completed CT: Completed MRI: N/A LE US: N/A  
  
Cath: Yes - reviewed: Yes Echo:Completed EKG: Completed EF %: 61  
  
PI's: N/A Carotid: Completed Mapping: N/A Dental: Completed PFT's: N/A  
  
Basename 11 0729  
WBC 7.18  
HB 13.1  
HCT 41.8  
  
INR 1.0  
CREAT 1.35  
  
UA: Normal  
HCG:N/A  
  
ABO/ABO Confirmed: Yes  
  
Blood ordered: No  
  
SA Swab: Yes - results: Pending  
  
Last Dose of Anticoagulation: vitamins  
  
Op Note: N/A  
  
Pacemaker Check: N/A  
  
Consults: GI 2001  
  
DM: No  
  
Cardiac Surgical prep: No  
  
SIGNATURE: Krystal Castellanos RN CHECKED BY:  
DATE of SERVICE: 2011  
TIME of SERVICE: 2:23 PM  
  
  
   
   
                                Anemia of chronic disease 2011  
   
                                Intestinal polyp 2011  
   
                                                      
  
  
Overview:Formatting of this note might be different from the original.  
Distal ileum ulcerated polyp.  
   
   
                                Nonspecific abnormal results of liver function s  
tudy 2006  
   
                                Impotence of organic origin 2006  
   
                                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  
                 2021  
  
documented as of this encounter (statuses as of 2023)  
[facility]06- History of Past illness Narrative*   
  
                                Problem         Noted Date      Resolved Date  
   
                                Medicare annual wellness visit, subsequent 2019  
   
                                Encounter for long-term (current) use of medicat  
ions 2018  
   
                                                      
  
  
Overview:Formatting of this note might be different from the original.  
Added automatically from request for surgery [id number]  
   
   
                                History of colonic polyps 2018  
   
                                                      
  
  
Overview:Formatting of this note might be different from the original.  
Added automatically from request for surgery [id number]  
   
   
                                Gastroesophageal reflux disease 2018  
   
                                                      
  
  
Overview:Formatting of this note might be different from the original.  
Added automatically from request for surgery [id number]  
   
   
                                Acute pulmonary edema 10/05/2011      10/06/2011  
   
                                                      
  
  
Overview:Formatting of this note might be different from the original.  
10/5/2011  
cardiogenic and noncardiogenic factors  
  
   
   
                                Atelectasis     10/05/2011      10/06/2011  
   
                                Hypotension     10/04/2011      10/06/2011  
   
                                                      
  
  
Overview:Formatting of this note might be different from the original.  
10/4/2011  
goal map 65-80  
   
   
                                Stress hyperglycemia 10/04/2011      10/06/2011  
   
                                                      
  
  
Overview:Formatting of this note might be different from the original.  
10/4/2011  
Perioperative insulin resistance and exacerbation of hyperglycemia.  
Control blood glucose with titration of insulin infusion  
  
10/5/2011  
adequate control  
goal FBG<180  
  
   
   
                                Post-op pain    10/04/2011      10/06/2011  
   
                                Mechanically assisted ventilation 10/04/2011      
  10/05/2011  
   
                                                      
  
  
Overview:Formatting of this note might be different from the original.  
10/4/2011  
optimize MV settings, WTE  
current setting:FiO2, 75%, peep 8  
   
   
                                Cardiac insufficiency 10/04/2011      10/05/2011  
   
                                                      
  
  
Overview:Formatting of this note might be different from the original.  
10/4/2011  
RV mild to moderate post pump  
goal CI>2.3  
   
   
                                Hypoxemia       10/04/2011      10/06/2011  
   
                                discharge planning 2011      10/19/2011  
   
                                                      
  
  
Overview:Formatting of this note might be different from the original.  
age 71,  lives in [place], [place]. No DC needs.  
/  
   
   
                                Pre-op testing  2011      10/04/2011  
   
                                                      
  
  
Overview:Formatting of this note is different from the original.  
Images from the original note were not included.  
[facility]  
PRE-OP CHECKLIST  
  
Surgeon: Deangelo Angulo M.D. Informed Consent Completed: No  
STS Score: 1.4%  
CAD: Yes - CAD on Problem List: Yes  
Is intended procedure a CABG: Yes - is a beta blocker ordered? Yes  
  
H & P completed: Yes  
  
PA/LAT: Completed CT: Completed MRI: N/A LE US: N/A  
  
Cath: Yes - reviewed: Yes Echo:Completed EKG: Completed EF %: 61  
  
PI's: N/A Carotid: Completed Mapping: N/A Dental: Completed PFT's: N/A  
  
Basename 11 0729  
WBC 7.18  
HB 13.1  
HCT 41.8  
  
INR 1.0  
CREAT 1.35  
  
UA: Normal  
HCG:N/A  
  
ABO/ABO Confirmed: Yes  
  
Blood ordered: No  
  
SA Swab: Yes - results: Pending  
  
Last Dose of Anticoagulation: vitamins  
  
Op Note: N/A  
  
Pacemaker Check: N/A  
  
Consults: GI 2001  
  
DM: No  
  
Cardiac Surgical prep: No  
  
SIGNATURE: Krystal Castellanos RN CHECKED BY:  
DATE of SERVICE: 2011  
TIME of SERVICE: 2:23 PM  
  
  
   
   
                                Anemia of chronic disease 2011  
   
                                Intestinal polyp 2011  
   
                                                      
  
  
Overview:Formatting of this note might be different from the original.  
Distal ileum ulcerated polyp.  
   
   
                                Nonspecific abnormal results of liver function s  
tudy 2006  
   
                                Impotence of organic origin 2006  
   
                                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  
                 2021  
  
documented as of this encounter (statuses as of 2023)  
[facility]06- History of Past illness Narrative*   
  
                                Problem         Noted Date      Resolved Date  
   
                                Medicare annual wellness visit, subsequent 2019  
   
                                Encounter for long-term (current) use of medicat  
ions 2018  
   
                                                      
  
  
Overview:Formatting of this note might be different from the original.  
Added automatically from request for surgery [id number]  
   
   
                                History of colonic polyps 2018  
   
                                                      
  
  
Overview:Formatting of this note might be different from the original.  
Added automatically from request for surgery [id number]  
   
   
                                Gastroesophageal reflux disease 2018  
   
                                                      
  
  
Overview:Formatting of this note might be different from the original.  
Added automatically from request for surgery [id number]  
   
   
                                Acute pulmonary edema 10/05/2011      10/06/2011  
   
                                                      
  
  
Overview:Formatting of this note might be different from the original.  
10/5/2011  
cardiogenic and noncardiogenic factors  
  
   
   
                                Atelectasis     10/05/2011      10/06/2011  
   
                                Hypotension     10/04/2011      10/06/2011  
   
                                                      
  
  
Overview:Formatting of this note might be different from the original.  
10/4/2011  
goal map 65-80  
   
   
                                Stress hyperglycemia 10/04/2011      10/06/2011  
   
                                                      
  
  
Overview:Formatting of this note might be different from the original.  
10/4/2011  
Perioperative insulin resistance and exacerbation of hyperglycemia.  
Control blood glucose with titration of insulin infusion  
  
10/5/2011  
adequate control  
goal FBG<180  
  
   
   
                                Post-op pain    10/04/2011      10/06/2011  
   
                                Mechanically assisted ventilation 10/04/2011      
  10/05/2011  
   
                                                      
  
  
Overview:Formatting of this note might be different from the original.  
10/4/2011  
optimize MV settings, WTE  
current setting:FiO2, 75%, peep 8  
   
   
                                Cardiac insufficiency 10/04/2011      10/05/2011  
   
                                                      
  
  
Overview:Formatting of this note might be different from the original.  
10/4/2011  
RV mild to moderate post pump  
goal CI>2.3  
   
   
                                Hypoxemia       10/04/2011      10/06/2011  
   
                                discharge planning 2011      10/19/2011  
   
                                                      
  
  
Overview:Formatting of this note might be different from the original.  
age 71,  lives in [place], [place]. No DC needs.  
/  
   
   
                                Pre-op testing  2011      10/04/2011  
   
                                                      
  
  
Overview:Formatting of this note is different from the original.  
Images from the original note were not included.  
[facility]  
PRE-OP CHECKLIST  
  
Surgeon: Deangelo Angulo M.D. Informed Consent Completed: No  
STS Score: 1.4%  
CAD: Yes - CAD on Problem List: Yes  
Is intended procedure a CABG: Yes - is a beta blocker ordered? Yes  
  
H & P completed: Yes  
  
PA/LAT: Completed CT: Completed MRI: N/A LE US: N/A  
  
Cath: Yes - reviewed: Yes Echo:Completed EKG: Completed EF %: 61  
  
PI's: N/A Carotid: Completed Mapping: N/A Dental: Completed PFT's: N/A  
  
Basename 11 0729  
WBC 7.18  
HB 13.1  
HCT 41.8  
  
INR 1.0  
CREAT 1.35  
  
UA: Normal  
HCG:N/A  
  
ABO/ABO Confirmed: Yes  
  
Blood ordered: No  
  
SA Swab: Yes - results: Pending  
  
Last Dose of Anticoagulation: vitamins  
  
Op Note: N/A  
  
Pacemaker Check: N/A  
  
Consults: GI 2001  
  
DM: No  
  
Cardiac Surgical prep: No  
  
SIGNATURE: Krystal Castellanos RN CHECKED BY:  
DATE of SERVICE: 2011  
TIME of SERVICE: 2:23 PM  
  
  
   
   
                                Anemia of chronic disease 2011  
   
                                Intestinal polyp 2011  
   
                                                      
  
  
Overview:Formatting of this note might be different from the original.  
Distal ileum ulcerated polyp.  
   
   
                                Nonspecific abnormal results of liver function s  
tudy 2006  
   
                                Impotence of organic origin 2006  
   
                                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  
                 2021  
  
documented as of this encounter (statuses as of 2023)  
[facility]06- History of Past illness Narrative*   
  
                                Problem         Noted Date      Resolved Date  
   
                                Medicare annual wellness visit, subsequent 2019  
   
                                Encounter for long-term (current) use of medicat  
ions 2018  
   
                                                      
  
  
Overview:Formatting of this note might be different from the original.  
Added automatically from request for surgery [id number]  
   
   
                                History of colonic polyps 2018  
   
                                                      
  
  
Overview:Formatting of this note might be different from the original.  
Added automatically from request for surgery [id number]  
   
   
                                Gastroesophageal reflux disease 2018  
   
                                                      
  
  
Overview:Formatting of this note might be different from the original.  
Added automatically from request for surgery [id number]  
   
   
                                Acute pulmonary edema 10/05/2011      10/06/2011  
   
                                                      
  
  
Overview:Formatting of this note might be different from the original.  
10/5/2011  
cardiogenic and noncardiogenic factors  
  
   
   
                                Atelectasis     10/05/2011      10/06/2011  
   
                                Hypotension     10/04/2011      10/06/2011  
   
                                                      
  
  
Overview:Formatting of this note might be different from the original.  
10/4/2011  
goal map 65-80  
   
   
                                Stress hyperglycemia 10/04/2011      10/06/2011  
   
                                                      
  
  
Overview:Formatting of this note might be different from the original.  
10/4/2011  
Perioperative insulin resistance and exacerbation of hyperglycemia.  
Control blood glucose with titration of insulin infusion  
  
10/5/2011  
adequate control  
goal FBG<180  
  
   
   
                                Post-op pain    10/04/2011      10/06/2011  
   
                                Mechanically assisted ventilation 10/04/2011      
  10/05/2011  
   
                                                      
  
  
Overview:Formatting of this note might be different from the original.  
10/4/2011  
optimize MV settings, WTE  
current setting:FiO2, 75%, peep 8  
   
   
                                Cardiac insufficiency 10/04/2011      10/05/2011  
   
                                                      
  
  
Overview:Formatting of this note might be different from the original.  
10/4/2011  
RV mild to moderate post pump  
goal CI>2.3  
   
   
                                Hypoxemia       10/04/2011      10/06/2011  
   
                                discharge planning 2011      10/19/2011  
   
                                                      
  
  
Overview:Formatting of this note might be different from the original.  
age 71,  lives in [place], [place]. No DC needs.  
/  
   
   
                                Pre-op testing  2011      10/04/2011  
   
                                                      
  
  
Overview:Formatting of this note is different from the original.  
Images from the original note were not included.  
[facility]  
PRE-OP CHECKLIST  
  
Surgeon: Deangelo Angulo M.D. Informed Consent Completed: No  
STS Score: 1.4%  
CAD: Yes - CAD on Problem List: Yes  
Is intended procedure a CABG: Yes - is a beta blocker ordered? Yes  
  
H & P completed: Yes  
  
PA/LAT: Completed CT: Completed MRI: N/A LE US: N/A  
  
Cath: Yes - reviewed: Yes Echo:Completed EKG: Completed EF %: 61  
  
PI's: N/A Carotid: Completed Mapping: N/A Dental: Completed PFT's: N/A  
  
Basename 11 0729  
WBC 7.18  
HB 13.1  
HCT 41.8  
  
INR 1.0  
CREAT 1.35  
  
UA: Normal  
HCG:N/A  
  
ABO/ABO Confirmed: Yes  
  
Blood ordered: No  
  
SA Swab: Yes - results: Pending  
  
Last Dose of Anticoagulation: vitamins  
  
Op Note: N/A  
  
Pacemaker Check: N/A  
  
Consults: GI 2001  
  
DM: No  
  
Cardiac Surgical prep: No  
  
SIGNATURE: Krystal Castellanos RN CHECKED BY:  
DATE of SERVICE: 2011  
TIME of SERVICE: 2:23 PM  
  
  
   
   
                                Anemia of chronic disease 2011  
   
                                Intestinal polyp 2011  
   
                                                      
  
  
Overview:Formatting of this note might be different from the original.  
Distal ileum ulcerated polyp.  
   
   
                                Nonspecific abnormal results of liver function s  
pepe 2006  
   
                                Impotence of organic origin 2006      09/0  
2015  
   
                                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  
                 2021  
  
documented as of this encounter (statuses as of 2023)  
[facility]06- History of Past illness Narrative*   
  
                                Problem         Noted Date      Resolved Date  
   
                                Medicare annual wellness visit, subsequent 2019  
   
                                Encounter for long-term (current) use of medicat  
ions 2018  
   
                                                      
  
  
Overview:Formatting of this note might be different from the original.  
Added automatically from request for surgery [id number]  
   
   
                                History of colonic polyps 2018  
   
                                                      
  
  
Overview:Formatting of this note might be different from the original.  
Added automatically from request for surgery [id number]  
   
   
                                Gastroesophageal reflux disease 2018  
   
                                                      
  
  
Overview:Formatting of this note might be different from the original.  
Added automatically from request for surgery [id number]  
   
   
                                Acute pulmonary edema 10/05/2011      10/06/2011  
   
                                                      
  
  
Overview:Formatting of this note might be different from the original.  
10/5/2011  
cardiogenic and noncardiogenic factors  
  
   
   
                                Atelectasis     10/05/2011      10/06/2011  
   
                                Hypotension     10/04/2011      10/06/2011  
   
                                                      
  
  
Overview:Formatting of this note might be different from the original.  
10/4/2011  
goal map 65-80  
   
   
                                Stress hyperglycemia 10/04/2011      10/06/2011  
   
                                                      
  
  
Overview:Formatting of this note might be different from the original.  
10/4/2011  
Perioperative insulin resistance and exacerbation of hyperglycemia.  
Control blood glucose with titration of insulin infusion  
  
10/5/2011  
adequate control  
goal FBG<180  
  
   
   
                                Post-op pain    10/04/2011      10/06/2011  
   
                                Mechanically assisted ventilation 10/04/2011      
  10/05/2011  
   
                                                      
  
  
Overview:Formatting of this note might be different from the original.  
10/4/2011  
optimize MV settings, WTE  
current setting:FiO2, 75%, peep 8  
   
   
                                Cardiac insufficiency 10/04/2011      10/05/2011  
   
                                                      
  
  
Overview:Formatting of this note might be different from the original.  
10/4/2011  
RV mild to moderate post pump  
goal CI>2.3  
   
   
                                Hypoxemia       10/04/2011      10/06/2011  
   
                                discharge planning 2011      10/19/2011  
   
                                                      
  
  
Overview:Formatting of this note might be different from the original.  
age 71,  lives in [place], [place]. No DC needs.  
/  
   
   
                                Pre-op testing  2011      10/04/2011  
   
                                                      
  
  
Overview:Formatting of this note is different from the original.  
Images from the original note were not included.  
[facility]  
PRE-OP CHECKLIST  
  
Surgeon: Deangelo Angulo M.D. Informed Consent Completed: No  
STS Score: 1.4%  
CAD: Yes - CAD on Problem List: Yes  
Is intended procedure a CABG: Yes - is a beta blocker ordered? Yes  
  
H & P completed: Yes  
  
PA/LAT: Completed CT: Completed MRI: N/A LE US: N/A  
  
Cath: Yes - reviewed: Yes Echo:Completed EKG: Completed EF %: 61  
  
PI's: N/A Carotid: Completed Mapping: N/A Dental: Completed PFT's: N/A  
  
Basename 11 0729  
WBC 7.18  
HB 13.1  
HCT 41.8  
  
INR 1.0  
CREAT 1.35  
  
UA: Normal  
HCG:N/A  
  
ABO/ABO Confirmed: Yes  
  
Blood ordered: No  
  
SA Swab: Yes - results: Pending  
  
Last Dose of Anticoagulation: vitamins  
  
Op Note: N/A  
  
Pacemaker Check: N/A  
  
Consults: GI 2001  
  
DM: No  
  
Cardiac Surgical prep: No  
  
SIGNATURE: Krystal Castellanos RN CHECKED BY:  
DATE of SERVICE: 2011  
TIME of SERVICE: 2:23 PM  
  
  
   
   
                                Anemia of chronic disease 2011  
   
                                Intestinal polyp 2011  
   
                                                      
  
  
Overview:Formatting of this note might be different from the original.  
Distal ileum ulcerated polyp.  
   
   
                                Nonspecific abnormal results of liver function s  
pepe 2006  
   
                                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  
2015  
   
                                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  
                 2021  
  
documented as of this encounter (statuses as of 2023)  
[facility]06- History of Past illness Narrative*   
  
                                Problem         Noted Date      Resolved Date  
   
                                Medicare annual wellness visit, subsequent 2019  
   
                                Encounter for long-term (current) use of medicat  
ions 2018  
   
                                                      
  
  
Overview:Formatting of this note might be different from the original.  
Added automatically from request for surgery [id number]  
   
   
                                History of colonic polyps 2018  
   
                                                      
  
  
Overview:Formatting of this note might be different from the original.  
Added automatically from request for surgery [id number]  
   
   
                                Gastroesophageal reflux disease 2018  
   
                                                      
  
  
Overview:Formatting of this note might be different from the original.  
Added automatically from request for surgery [id number]  
   
   
                                Acute pulmonary edema 10/05/2011      10/06/2011  
   
                                                      
  
  
Overview:Formatting of this note might be different from the original.  
10/5/2011  
cardiogenic and noncardiogenic factors  
  
   
   
                                Atelectasis     10/05/2011      10/06/2011  
   
                                Hypotension     10/04/2011      10/06/2011  
   
                                                      
  
  
Overview:Formatting of this note might be different from the original.  
10/4/2011  
goal map 65-80  
   
   
                                Stress hyperglycemia 10/04/2011      10/06/2011  
   
                                                      
  
  
Overview:Formatting of this note might be different from the original.  
10/4/2011  
Perioperative insulin resistance and exacerbation of hyperglycemia.  
Control blood glucose with titration of insulin infusion  
  
10/5/2011  
adequate control  
goal FBG<180  
  
   
   
                                Post-op pain    10/04/2011      10/06/2011  
   
                                Mechanically assisted ventilation 10/04/2011      
  10/05/2011  
   
                                                      
  
  
Overview:Formatting of this note might be different from the original.  
10/4/2011  
optimize MV settings, WTE  
current setting:FiO2, 75%, peep 8  
   
   
                                Cardiac insufficiency 10/04/2011      10/05/2011  
   
                                                      
  
  
Overview:Formatting of this note might be different from the original.  
10/4/2011  
RV mild to moderate post pump  
goal CI>2.3  
   
   
                                Hypoxemia       10/04/2011      10/06/2011  
   
                                discharge planning 2011      10/19/2011  
   
                                                      
  
  
Overview:Formatting of this note might be different from the original.  
age 71,  lives in [place], [place]. No DC needs.  
/  
   
   
                                Pre-op testing  2011      10/04/2011  
   
                                                      
  
  
Overview:Formatting of this note is different from the original.  
Images from the original note were not included.  
[facility]  
PRE-OP CHECKLIST  
  
Surgeon: Deangelo Angulo M.D. Informed Consent Completed: No  
STS Score: 1.4%  
CAD: Yes - CAD on Problem List: Yes  
Is intended procedure a CABG: Yes - is a beta blocker ordered? Yes  
  
H & P completed: Yes  
  
PA/LAT: Completed CT: Completed MRI: N/A LE US: N/A  
  
Cath: Yes - reviewed: Yes Echo:Completed EKG: Completed EF %: 61  
  
PI's: N/A Carotid: Completed Mapping: N/A Dental: Completed PFT's: N/A  
  
Basename 11 0729  
WBC 7.18  
HB 13.1  
HCT 41.8  
  
INR 1.0  
CREAT 1.35  
  
UA: Normal  
HCG:N/A  
  
ABO/ABO Confirmed: Yes  
  
Blood ordered: No  
  
SA Swab: Yes - results: Pending  
  
Last Dose of Anticoagulation: vitamins  
  
Op Note: N/A  
  
Pacemaker Check: N/A  
  
Consults: GI 2001  
  
DM: No  
  
Cardiac Surgical prep: No  
  
SIGNATURE: Krystal Castellanos RN CHECKED BY:  
DATE of SERVICE: 2011  
TIME of SERVICE: 2:23 PM  
  
  
   
   
                                Anemia of chronic disease 2011  
   
                                Intestinal polyp 2011  
   
                                                      
  
  
Overview:Formatting of this note might be different from the original.  
Distal ileum ulcerated polyp.  
   
   
                                Nonspecific abnormal results of liver function s  
nialldy 2006  
   
                                Impotence of organic origin 2006  
   
                                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  
                 2021  
  
documented as of this encounter (statuses as of 2023)  
[facility]06- History of Past illness Narrative*   
  
                                Problem         Noted Date      Resolved Date  
   
                                Medicare annual wellness visit, subsequent 2019  
   
                                Encounter for long-term (current) use of medicat  
ions 2018  
   
                                                      
  
  
Overview:Formatting of this note might be different from the original.  
Added automatically from request for surgery [id number]  
   
   
                                History of colonic polyps 2018  
   
                                                      
  
  
Overview:Formatting of this note might be different from the original.  
Added automatically from request for surgery [id number]  
   
   
                                Gastroesophageal reflux disease 2018  
   
                                                      
  
  
Overview:Formatting of this note might be different from the original.  
Added automatically from request for surgery [id number]  
   
   
                                Acute pulmonary edema 10/05/2011      10/06/2011  
   
                                                      
  
  
Overview:Formatting of this note might be different from the original.  
10/5/2011  
cardiogenic and noncardiogenic factors  
  
   
   
                                Atelectasis     10/05/2011      10/06/2011  
   
                                Hypotension     10/04/2011      10/06/2011  
   
                                                      
  
  
Overview:Formatting of this note might be different from the original.  
10/4/2011  
goal map 65-80  
   
   
                                Stress hyperglycemia 10/04/2011      10/06/2011  
   
                                                      
  
  
Overview:Formatting of this note might be different from the original.  
10/4/2011  
Perioperative insulin resistance and exacerbation of hyperglycemia.  
Control blood glucose with titration of insulin infusion  
  
10/5/2011  
adequate control  
goal FBG<180  
  
   
   
                                Post-op pain    10/04/2011      10/06/2011  
   
                                Mechanically assisted ventilation 10/04/2011      
  10/05/2011  
   
                                                      
  
  
Overview:Formatting of this note might be different from the original.  
10/4/2011  
optimize MV settings, WTE  
current setting:FiO2, 75%, peep 8  
   
   
                                Cardiac insufficiency 10/04/2011      10/05/2011  
   
                                                      
  
  
Overview:Formatting of this note might be different from the original.  
10/4/2011  
RV mild to moderate post pump  
goal CI>2.3  
   
   
                                Hypoxemia       10/04/2011      10/06/2011  
   
                                discharge planning 2011      10/19/2011  
   
                                                      
  
  
Overview:Formatting of this note might be different from the original.  
age 71,  lives in [place], [place]. No DC needs.  
/  
   
   
                                Pre-op testing  2011      10/04/2011  
   
                                                      
  
  
Overview:Formatting of this note is different from the original.  
Images from the original note were not included.  
[facility]  
PRE-OP CHECKLIST  
  
Surgeon: Deangelo Angulo M.D. Informed Consent Completed: No  
STS Score: 1.4%  
CAD: Yes - CAD on Problem List: Yes  
Is intended procedure a CABG: Yes - is a beta blocker ordered? Yes  
  
H & P completed: Yes  
  
PA/LAT: Completed CT: Completed MRI: N/A LE US: N/A  
  
Cath: Yes - reviewed: Yes Echo:Completed EKG: Completed EF %: 61  
  
PI's: N/A Carotid: Completed Mapping: N/A Dental: Completed PFT's: N/A  
  
Basename 11 0729  
WBC 7.18  
HB 13.1  
HCT 41.8  
  
INR 1.0  
CREAT 1.35  
  
UA: Normal  
HCG:N/A  
  
ABO/ABO Confirmed: Yes  
  
Blood ordered: No  
  
SA Swab: Yes - results: Pending  
  
Last Dose of Anticoagulation: vitamins  
  
Op Note: N/A  
  
Pacemaker Check: N/A  
  
Consults: GI 2001  
  
DM: No  
  
Cardiac Surgical prep: No  
  
SIGNATURE: Krystal Castellanos RN CHECKED BY:  
DATE of SERVICE: 2011  
TIME of SERVICE: 2:23 PM  
  
  
   
   
                                Anemia of chronic disease 2011  
   
                                Intestinal polyp 2011  
   
                                                      
  
  
Overview:Formatting of this note might be different from the original.  
Distal ileum ulcerated polyp.  
   
   
                                Nonspecific abnormal results of liver function s  
tudy 2006  
   
                                Impotence of organic origin 2006  
   
                                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  
                 2021  
  
documented as of this encounter (statuses as of 2023)  
[facility]06- History of Past illness Narrative*   
  
                                Problem         Noted Date      Resolved Date  
   
                                Medicare annual wellness visit, subsequent 2019  
   
                                Encounter for long-term (current) use of medicat  
ions 2018  
   
                                                      
  
  
Overview:Formatting of this note might be different from the original.  
Added automatically from request for surgery [id number]  
   
   
                                History of colonic polyps 2018  
   
                                                      
  
  
Overview:Formatting of this note might be different from the original.  
Added automatically from request for surgery [id number]  
   
   
                                Gastroesophageal reflux disease 2018  
   
                                                      
  
  
Overview:Formatting of this note might be different from the original.  
Added automatically from request for surgery [id number]  
   
   
                                Acute pulmonary edema 10/05/2011      10/06/2011  
   
                                                      
  
  
Overview:Formatting of this note might be different from the original.  
10/5/2011  
cardiogenic and noncardiogenic factors  
  
   
   
                                Atelectasis     10/05/2011      10/06/2011  
   
                                Hypotension     10/04/2011      10/06/2011  
   
                                                      
  
  
Overview:Formatting of this note might be different from the original.  
10/4/2011  
goal map 65-80  
   
   
                                Stress hyperglycemia 10/04/2011      10/06/2011  
   
                                                      
  
  
Overview:Formatting of this note might be different from the original.  
10/4/2011  
Perioperative insulin resistance and exacerbation of hyperglycemia.  
Control blood glucose with titration of insulin infusion  
  
10/5/2011  
adequate control  
goal FBG<180  
  
   
   
                                Post-op pain    10/04/2011      10/06/2011  
   
                                Mechanically assisted ventilation 10/04/2011      
  10/05/2011  
   
                                                      
  
  
Overview:Formatting of this note might be different from the original.  
10/4/2011  
optimize MV settings, WTE  
current setting:FiO2, 75%, peep 8  
   
   
                                Cardiac insufficiency 10/04/2011      10/05/2011  
   
                                                      
  
  
Overview:Formatting of this note might be different from the original.  
10/4/2011  
RV mild to moderate post pump  
goal CI>2.3  
   
   
                                Hypoxemia       10/04/2011      10/06/2011  
   
                                discharge planning 2011      10/19/2011  
   
                                                      
  
  
Overview:Formatting of this note might be different from the original.  
age 71,  lives in [place], [place]. No DC needs.  
/  
   
   
                                Pre-op testing  2011      10/04/2011  
   
                                                      
  
  
Overview:Formatting of this note is different from the original.  
Images from the original note were not included.  
[facility]  
PRE-OP CHECKLIST  
  
Surgeon: Deangelo Angulo M.D. Informed Consent Completed: No  
STS Score: 1.4%  
CAD: Yes - CAD on Problem List: Yes  
Is intended procedure a CABG: Yes - is a beta blocker ordered? Yes  
  
H & P completed: Yes  
  
PA/LAT: Completed CT: Completed MRI: N/A LE US: N/A  
  
Cath: Yes - reviewed: Yes Echo:Completed EKG: Completed EF %: 61  
  
PI's: N/A Carotid: Completed Mapping: N/A Dental: Completed PFT's: N/A  
  
Basename 11 0729  
WBC 7.18  
HB 13.1  
HCT 41.8  
  
INR 1.0  
CREAT 1.35  
  
UA: Normal  
HCG:N/A  
  
ABO/ABO Confirmed: Yes  
  
Blood ordered: No  
  
SA Swab: Yes - results: Pending  
  
Last Dose of Anticoagulation: vitamins  
  
Op Note: N/A  
  
Pacemaker Check: N/A  
  
Consults: GI 2001  
  
DM: No  
  
Cardiac Surgical prep: No  
  
SIGNATURE: Krystal Castellanos RN CHECKED BY:  
DATE of SERVICE: 2011  
TIME of SERVICE: 2:23 PM  
  
  
   
   
                                Anemia of chronic disease 2011  
   
                                Intestinal polyp 2011  
   
                                                      
  
  
Overview:Formatting of this note might be different from the original.  
Distal ileum ulcerated polyp.  
   
   
                                Nonspecific abnormal results of liver function s  
tudy 2006  
   
                                Impotence of organic origin 2006  
   
                                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  
                 2021  
  
documented as of this encounter (statuses as of 2023)  
[facility]06- History of Past illness Narrative*   
  
                                Problem         Noted Date      Resolved Date  
   
                                Medicare annual wellness visit, subsequent 2019  
   
                                Encounter for long-term (current) use of medicat  
ions 2018  
   
                                                      
  
  
Overview:Formatting of this note might be different from the original.  
Added automatically from request for surgery [id number]  
   
   
                                History of colonic polyps 2018  
   
                                                      
  
  
Overview:Formatting of this note might be different from the original.  
Added automatically from request for surgery [id number]  
   
   
                                Gastroesophageal reflux disease 2018  
   
                                                      
  
  
Overview:Formatting of this note might be different from the original.  
Added automatically from request for surgery [id number]  
   
   
                                Acute pulmonary edema 10/05/2011      10/06/2011  
   
                                                      
  
  
Overview:Formatting of this note might be different from the original.  
10/5/2011  
cardiogenic and noncardiogenic factors  
  
   
   
                                Atelectasis     10/05/2011      10/06/2011  
   
                                Hypotension     10/04/2011      10/06/2011  
   
                                                      
  
  
Overview:Formatting of this note might be different from the original.  
10/4/2011  
goal map 65-80  
   
   
                                Stress hyperglycemia 10/04/2011      10/06/2011  
   
                                                      
  
  
Overview:Formatting of this note might be different from the original.  
10/4/2011  
Perioperative insulin resistance and exacerbation of hyperglycemia.  
Control blood glucose with titration of insulin infusion  
  
10/5/2011  
adequate control  
goal FBG<180  
  
   
   
                                Post-op pain    10/04/2011      10/06/2011  
   
                                Mechanically assisted ventilation 10/04/2011      
  10/05/2011  
   
                                                      
  
  
Overview:Formatting of this note might be different from the original.  
10/4/2011  
optimize MV settings, WTE  
current setting:FiO2, 75%, peep 8  
   
   
                                Cardiac insufficiency 10/04/2011      10/05/2011  
   
                                                      
  
  
Overview:Formatting of this note might be different from the original.  
10/4/2011  
RV mild to moderate post pump  
goal CI>2.3  
   
   
                                Hypoxemia       10/04/2011      10/06/2011  
   
                                discharge planning 2011      10/19/2011  
   
                                                      
  
  
Overview:Formatting of this note might be different from the original.  
age 71,  lives in [place], [place]. No DC needs.  
/  
   
   
                                Pre-op testing  2011      10/04/2011  
   
                                                      
  
  
Overview:Formatting of this note is different from the original.  
Images from the original note were not included.  
[facility]  
PRE-OP CHECKLIST  
  
Surgeon: Deangelo Angulo M.D. Informed Consent Completed: No  
STS Score: 1.4%  
CAD: Yes - CAD on Problem List: Yes  
Is intended procedure a CABG: Yes - is a beta blocker ordered? Yes  
  
H & P completed: Yes  
  
PA/LAT: Completed CT: Completed MRI: N/A LE US: N/A  
  
Cath: Yes - reviewed: Yes Echo:Completed EKG: Completed EF %: 61  
  
PI's: N/A Carotid: Completed Mapping: N/A Dental: Completed PFT's: N/A  
  
Basename 11 0729  
WBC 7.18  
HB 13.1  
HCT 41.8  
  
INR 1.0  
CREAT 1.35  
  
UA: Normal  
HCG:N/A  
  
ABO/ABO Confirmed: Yes  
  
Blood ordered: No  
  
SA Swab: Yes - results: Pending  
  
Last Dose of Anticoagulation: vitamins  
  
Op Note: N/A  
  
Pacemaker Check: N/A  
  
Consults: GI 2001  
  
DM: No  
  
Cardiac Surgical prep: No  
  
SIGNATURE: Krystal Castellanos RN CHECKED BY:  
DATE of SERVICE: 2011  
TIME of SERVICE: 2:23 PM  
  
  
   
   
                                Anemia of chronic disease 2011  
   
                                Intestinal polyp 2011  
   
                                                      
  
  
Overview:Formatting of this note might be different from the original.  
Distal ileum ulcerated polyp.  
   
   
                                Nonspecific abnormal results of liver function s  
pepe 2006  
   
                                Impotence of organic origin 2006  
   
                                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  
                 2021  
  
documented as of this encounter (statuses as of 2023)  
[facility]06- History of Past illness Narrative*   
  
                                Problem         Noted Date      Resolved Date  
   
                                Medicare annual wellness visit, subsequent 2019  
   
                                Encounter for long-term (current) use of medicat  
ions 2018  
   
                                                      
  
  
Overview:Formatting of this note might be different from the original.  
Added automatically from request for surgery [id number]  
   
   
                                History of colonic polyps 2018  
   
                                                      
  
  
Overview:Formatting of this note might be different from the original.  
Added automatically from request for surgery [id number]  
   
   
                                Gastroesophageal reflux disease 2018  
   
                                                      
  
  
Overview:Formatting of this note might be different from the original.  
Added automatically from request for surgery [id number]  
   
   
                                Acute pulmonary edema 10/05/2011      10/06/2011  
   
                                                      
  
  
Overview:Formatting of this note might be different from the original.  
10/5/2011  
cardiogenic and noncardiogenic factors  
  
   
   
                                Atelectasis     10/05/2011      10/06/2011  
   
                                Hypotension     10/04/2011      10/06/2011  
   
                                                      
  
  
Overview:Formatting of this note might be different from the original.  
10/4/2011  
goal map 65-80  
   
   
                                Stress hyperglycemia 10/04/2011      10/06/2011  
   
                                                      
  
  
Overview:Formatting of this note might be different from the original.  
10/4/2011  
Perioperative insulin resistance and exacerbation of hyperglycemia.  
Control blood glucose with titration of insulin infusion  
  
10/5/2011  
adequate control  
goal FBG<180  
  
   
   
                                Post-op pain    10/04/2011      10/06/2011  
   
                                Mechanically assisted ventilation 10/04/2011      
  10/05/2011  
   
                                                      
  
  
Overview:Formatting of this note might be different from the original.  
10/4/2011  
optimize MV settings, WTE  
current setting:FiO2, 75%, peep 8  
   
   
                                Cardiac insufficiency 10/04/2011      10/05/2011  
   
                                                      
  
  
Overview:Formatting of this note might be different from the original.  
10/4/2011  
RV mild to moderate post pump  
goal CI>2.3  
   
   
                                Hypoxemia       10/04/2011      10/06/2011  
   
                                discharge planning 2011      10/19/2011  
   
                                                      
  
  
Overview:Formatting of this note might be different from the original.  
age 71,  lives in [place], [place]. No DC needs.  
/  
   
   
                                Pre-op testing  2011      10/04/2011  
   
                                                      
  
  
Overview:Formatting of this note is different from the original.  
Images from the original note were not included.  
[facility]  
PRE-OP CHECKLIST  
  
Surgeon: Deangelo Angulo M.D. Informed Consent Completed: No  
STS Score: 1.4%  
CAD: Yes - CAD on Problem List: Yes  
Is intended procedure a CABG: Yes - is a beta blocker ordered? Yes  
  
H & P completed: Yes  
  
PA/LAT: Completed CT: Completed MRI: N/A LE US: N/A  
  
Cath: Yes - reviewed: Yes Echo:Completed EKG: Completed EF %: 61  
  
PI's: N/A Carotid: Completed Mapping: N/A Dental: Completed PFT's: N/A  
  
Basename 11 0729  
WBC 7.18  
HB 13.1  
HCT 41.8  
  
INR 1.0  
CREAT 1.35  
  
UA: Normal  
HCG:N/A  
  
ABO/ABO Confirmed: Yes  
  
Blood ordered: No  
  
SA Swab: Yes - results: Pending  
  
Last Dose of Anticoagulation: vitamins  
  
Op Note: N/A  
  
Pacemaker Check: N/A  
  
Consults: GI 2001  
  
DM: No  
  
Cardiac Surgical prep: No  
  
SIGNATURE: Krystal Castellanos RN CHECKED BY:  
DATE of SERVICE: 2011  
TIME of SERVICE: 2:23 PM  
  
  
   
   
                                Anemia of chronic disease 2011  
   
                                Intestinal polyp 2011  
   
                                                      
  
  
Overview:Formatting of this note might be different from the original.  
Distal ileum ulcerated polyp.  
   
   
                                Nonspecific abnormal results of liver function s  
tudy 2006  
   
                                Impotence of organic origin 2006  
   
                                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  
                 2021  
  
documented as of this encounter (statuses as of 2023)  
[facility]06- History of Past illness Narrative*   
  
                                Problem         Noted Date      Resolved Date  
   
                                Medicare annual wellness visit, subsequent 2019  
   
                                Encounter for long-term (current) use of medicat  
ions 2018  
   
                                                      
  
  
Overview:Formatting of this note might be different from the original.  
Added automatically from request for surgery [id number]  
   
   
                                History of colonic polyps 2018  
   
                                                      
  
  
Overview:Formatting of this note might be different from the original.  
Added automatically from request for surgery [id number]  
   
   
                                Gastroesophageal reflux disease 2018  
   
                                                      
  
  
Overview:Formatting of this note might be different from the original.  
Added automatically from request for surgery [id number]  
   
   
                                Acute pulmonary edema 10/05/2011      10/06/2011  
   
                                                      
  
  
Overview:Formatting of this note might be different from the original.  
10/5/2011  
cardiogenic and noncardiogenic factors  
  
   
   
                                Atelectasis     10/05/2011      10/06/2011  
   
                                Hypotension     10/04/2011      10/06/2011  
   
                                                      
  
  
Overview:Formatting of this note might be different from the original.  
10/4/2011  
goal map 65-80  
   
   
                                Stress hyperglycemia 10/04/2011      10/06/2011  
   
                                                      
  
  
Overview:Formatting of this note might be different from the original.  
10/4/2011  
Perioperative insulin resistance and exacerbation of hyperglycemia.  
Control blood glucose with titration of insulin infusion  
  
10/5/2011  
adequate control  
goal FBG<180  
  
   
   
                                Post-op pain    10/04/2011      10/06/2011  
   
                                Mechanically assisted ventilation 10/04/2011      
  10/05/2011  
   
                                                      
  
  
Overview:Formatting of this note might be different from the original.  
10/4/2011  
optimize MV settings, WTE  
current setting:FiO2, 75%, peep 8  
   
   
                                Cardiac insufficiency 10/04/2011      10/05/2011  
   
                                                      
  
  
Overview:Formatting of this note might be different from the original.  
10/4/2011  
RV mild to moderate post pump  
goal CI>2.3  
   
   
                                Hypoxemia       10/04/2011      10/06/2011  
   
                                discharge planning 2011      10/19/2011  
   
                                                      
  
  
Overview:Formatting of this note might be different from the original.  
age 71,  lives in [place], [place]. No DC needs.  
/  
   
   
                                Pre-op testing  2011      10/04/2011  
   
                                                      
  
  
Overview:Formatting of this note is different from the original.  
Images from the original note were not included.  
[facility]  
PRE-OP CHECKLIST  
  
Surgeon: Deangelo Angulo M.D. Informed Consent Completed: No  
STS Score: 1.4%  
CAD: Yes - CAD on Problem List: Yes  
Is intended procedure a CABG: Yes - is a beta blocker ordered? Yes  
  
H & P completed: Yes  
  
PA/LAT: Completed CT: Completed MRI: N/A LE US: N/A  
  
Cath: Yes - reviewed: Yes Echo:Completed EKG: Completed EF %: 61  
  
PI's: N/A Carotid: Completed Mapping: N/A Dental: Completed PFT's: N/A  
  
Basename 11 0729  
WBC 7.18  
HB 13.1  
HCT 41.8  
  
INR 1.0  
CREAT 1.35  
  
UA: Normal  
HCG:N/A  
  
ABO/ABO Confirmed: Yes  
  
Blood ordered: No  
  
SA Swab: Yes - results: Pending  
  
Last Dose of Anticoagulation: vitamins  
  
Op Note: N/A  
  
Pacemaker Check: N/A  
  
Consults: GI 2001  
  
DM: No  
  
Cardiac Surgical prep: No  
  
SIGNATURE: Krystal Castellanos RN CHECKED BY:  
DATE of SERVICE: 2011  
TIME of SERVICE: 2:23 PM  
  
  
   
   
                                Anemia of chronic disease 2011  
   
                                Intestinal polyp 2011  
   
                                                      
  
  
Overview:Formatting of this note might be different from the original.  
Distal ileum ulcerated polyp.  
   
   
                                Nonspecific abnormal results of liver function s  
tudy 2006  
   
                                Impotence of organic origin 2006  
   
                                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  
                 2021  
  
documented as of this encounter (statuses as of 2023)  
[facility]06- History of Past illness Narrative*   
  
                                Problem         Noted Date      Resolved Date  
   
                                Medicare annual wellness visit, subsequent 2019  
   
                                Encounter for long-term (current) use of medicat  
ions 2018  
   
                                                      
  
  
Overview:Formatting of this note might be different from the original.  
Added automatically from request for surgery [id number]  
   
   
                                History of colonic polyps 2018  
   
                                                      
  
  
Overview:Formatting of this note might be different from the original.  
Added automatically from request for surgery [id number]  
   
   
                                Gastroesophageal reflux disease 2018  
   
                                                      
  
  
Overview:Formatting of this note might be different from the original.  
Added automatically from request for surgery [id number]  
   
   
                                Acute pulmonary edema 10/05/2011      10/06/2011  
   
                                                      
  
  
Overview:Formatting of this note might be different from the original.  
10/5/2011  
cardiogenic and noncardiogenic factors  
  
   
   
                                Atelectasis     10/05/2011      10/06/2011  
   
                                Hypotension     10/04/2011      10/06/2011  
   
                                                      
  
  
Overview:Formatting of this note might be different from the original.  
10/4/2011  
goal map 65-80  
   
   
                                Stress hyperglycemia 10/04/2011      10/06/2011  
   
                                                      
  
  
Overview:Formatting of this note might be different from the original.  
10/4/2011  
Perioperative insulin resistance and exacerbation of hyperglycemia.  
Control blood glucose with titration of insulin infusion  
  
10/5/2011  
adequate control  
goal FBG<180  
  
   
   
                                Post-op pain    10/04/2011      10/06/2011  
   
                                Mechanically assisted ventilation 10/04/2011      
  10/05/2011  
   
                                                      
  
  
Overview:Formatting of this note might be different from the original.  
10/4/2011  
optimize MV settings, WTE  
current setting:FiO2, 75%, peep 8  
   
   
                                Cardiac insufficiency 10/04/2011      10/05/2011  
   
                                                      
  
  
Overview:Formatting of this note might be different from the original.  
10/4/2011  
RV mild to moderate post pump  
goal CI>2.3  
   
   
                                Hypoxemia       10/04/2011      10/06/2011  
   
                                discharge planning 2011      10/19/2011  
   
                                                      
  
  
Overview:Formatting of this note might be different from the original.  
age 71,  lives in [place], [place]. No DC needs.  
/  
   
   
                                Pre-op testing  2011      10/04/2011  
   
                                                      
  
  
Overview:Formatting of this note is different from the original.  
Images from the original note were not included.  
[facility]  
PRE-OP CHECKLIST  
  
Surgeon: Deangelo Angulo M.D. Informed Consent Completed: No  
STS Score: 1.4%  
CAD: Yes - CAD on Problem List: Yes  
Is intended procedure a CABG: Yes - is a beta blocker ordered? Yes  
  
H & P completed: Yes  
  
PA/LAT: Completed CT: Completed MRI: N/A LE US: N/A  
  
Cath: Yes - reviewed: Yes Echo:Completed EKG: Completed EF %: 61  
  
PI's: N/A Carotid: Completed Mapping: N/A Dental: Completed PFT's: N/A  
  
Basename 11 0729  
WBC 7.18  
HB 13.1  
HCT 41.8  
  
INR 1.0  
CREAT 1.35  
  
UA: Normal  
HCG:N/A  
  
ABO/ABO Confirmed: Yes  
  
Blood ordered: No  
  
SA Swab: Yes - results: Pending  
  
Last Dose of Anticoagulation: vitamins  
  
Op Note: N/A  
  
Pacemaker Check: N/A  
  
Consults: GI 2001  
  
DM: No  
  
Cardiac Surgical prep: No  
  
SIGNATURE: Krystal Castellanos RN CHECKED BY:  
DATE of SERVICE: 2011  
TIME of SERVICE: 2:23 PM  
  
  
   
   
                                Anemia of chronic disease 2011  
   
                                Intestinal polyp 2011  
   
                                                      
  
  
Overview:Formatting of this note might be different from the original.  
Distal ileum ulcerated polyp.  
   
   
                                Nonspecific abnormal results of liver function s  
pepe 2006  
   
                                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  
                 2021  
  
documented as of this encounter (statuses as of 2023)  
[facility]06- History of Past illness Narrative*   
  
                                Problem         Noted Date      Resolved Date  
   
                                Medicare annual wellness visit, subsequent 2019  
   
                                Encounter for long-term (current) use of medicat  
ions 2018  
   
                                                      
  
  
Overview:Formatting of this note might be different from the original.  
Added automatically from request for surgery [id number]  
   
   
                                History of colonic polyps 2018  
   
                                                      
  
  
Overview:Formatting of this note might be different from the original.  
Added automatically from request for surgery [id number]  
   
   
                                Gastroesophageal reflux disease 2018  
   
                                                      
  
  
Overview:Formatting of this note might be different from the original.  
Added automatically from request for surgery [id number]  
   
   
                                Acute pulmonary edema 10/05/2011      10/06/2011  
   
                                                      
  
  
Overview:Formatting of this note might be different from the original.  
10/5/2011  
cardiogenic and noncardiogenic factors  
  
   
   
                                Atelectasis     10/05/2011      10/06/2011  
   
                                Hypotension     10/04/2011      10/06/2011  
   
                                                      
  
  
Overview:Formatting of this note might be different from the original.  
10/4/2011  
goal map 65-80  
   
   
                                Stress hyperglycemia 10/04/2011      10/06/2011  
   
                                                      
  
  
Overview:Formatting of this note might be different from the original.  
10/4/2011  
Perioperative insulin resistance and exacerbation of hyperglycemia.  
Control blood glucose with titration of insulin infusion  
  
10/5/2011  
adequate control  
goal FBG<180  
  
   
   
                                Post-op pain    10/04/2011      10/06/2011  
   
                                Mechanically assisted ventilation 10/04/2011      
  10/05/2011  
   
                                                      
  
  
Overview:Formatting of this note might be different from the original.  
10/4/2011  
optimize MV settings, WTE  
current setting:FiO2, 75%, peep 8  
   
   
                                Cardiac insufficiency 10/04/2011      10/05/2011  
   
                                                      
  
  
Overview:Formatting of this note might be different from the original.  
10/4/2011  
RV mild to moderate post pump  
goal CI>2.3  
   
   
                                Hypoxemia       10/04/2011      10/06/2011  
   
                                discharge planning 2011      10/19/2011  
   
                                                      
  
  
Overview:Formatting of this note might be different from the original.  
age 71,  lives in [place], [place]. No DC needs.  
/  
   
   
                                Pre-op testing  2011      10/04/2011  
   
                                                      
  
  
Overview:Formatting of this note is different from the original.  
Images from the original note were not included.  
[facility]  
PRE-OP CHECKLIST  
  
Surgeon: Deangelo Angulo M.D. Informed Consent Completed: No  
STS Score: 1.4%  
CAD: Yes - CAD on Problem List: Yes  
Is intended procedure a CABG: Yes - is a beta blocker ordered? Yes  
  
H & P completed: Yes  
  
PA/LAT: Completed CT: Completed MRI: N/A LE US: N/A  
  
Cath: Yes - reviewed: Yes Echo:Completed EKG: Completed EF %: 61  
  
PI's: N/A Carotid: Completed Mapping: N/A Dental: Completed PFT's: N/A  
  
Basename 11 0729  
WBC 7.18  
HB 13.1  
HCT 41.8  
  
INR 1.0  
CREAT 1.35  
  
UA: Normal  
HCG:N/A  
  
ABO/ABO Confirmed: Yes  
  
Blood ordered: No  
  
SA Swab: Yes - results: Pending  
  
Last Dose of Anticoagulation: vitamins  
  
Op Note: N/A  
  
Pacemaker Check: N/A  
  
Consults: GI 2001  
  
DM: No  
  
Cardiac Surgical prep: No  
  
SIGNATURE: Krystal Castellanos RN CHECKED BY:  
DATE of SERVICE: 2011  
TIME of SERVICE: 2:23 PM  
  
  
   
   
                                Anemia of chronic disease 2011  
   
                                Intestinal polyp 2011  
   
                                                      
  
  
Overview:Formatting of this note might be different from the original.  
Distal ileum ulcerated polyp.  
   
   
                                Nonspecific abnormal results of liver function s  
nialldy 2006  
   
                                Impotence of organic origin 2006      09/0  
2015  
   
                                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  
                 2021  
  
documented as of this encounter (statuses as of 2023)  
[facility]06- History of Past illness Narrative*   
  
                                Problem         Noted Date      Resolved Date  
   
                                Medicare annual wellness visit, subsequent 2019  
   
                                Encounter for long-term (current) use of medicat  
ions 2018  
   
                                                      
  
  
Overview:Formatting of this note might be different from the original.  
Added automatically from request for surgery [id number]  
   
   
                                History of colonic polyps 2018  
   
                                                      
  
  
Overview:Formatting of this note might be different from the original.  
Added automatically from request for surgery [id number]  
   
   
                                Gastroesophageal reflux disease 2018  
   
                                                      
  
  
Overview:Formatting of this note might be different from the original.  
Added automatically from request for surgery [id number]  
   
   
                                Acute pulmonary edema 10/05/2011      10/06/2011  
   
                                                      
  
  
Overview:Formatting of this note might be different from the original.  
10/5/2011  
cardiogenic and noncardiogenic factors  
  
   
   
                                Atelectasis     10/05/2011      10/06/2011  
   
                                Hypotension     10/04/2011      10/06/2011  
   
                                                      
  
  
Overview:Formatting of this note might be different from the original.  
10/4/2011  
goal map 65-80  
   
   
                                Stress hyperglycemia 10/04/2011      10/06/2011  
   
                                                      
  
  
Overview:Formatting of this note might be different from the original.  
10/4/2011  
Perioperative insulin resistance and exacerbation of hyperglycemia.  
Control blood glucose with titration of insulin infusion  
  
10/5/2011  
adequate control  
goal FBG<180  
  
   
   
                                Post-op pain    10/04/2011      10/06/2011  
   
                                Mechanically assisted ventilation 10/04/2011      
  10/05/2011  
   
                                                      
  
  
Overview:Formatting of this note might be different from the original.  
10/4/2011  
optimize MV settings, WTE  
current setting:FiO2, 75%, peep 8  
   
   
                                Cardiac insufficiency 10/04/2011      10/05/2011  
   
                                                      
  
  
Overview:Formatting of this note might be different from the original.  
10/4/2011  
RV mild to moderate post pump  
goal CI>2.3  
   
   
                                Hypoxemia       10/04/2011      10/06/2011  
   
                                discharge planning 2011      10/19/2011  
   
                                                      
  
  
Overview:Formatting of this note might be different from the original.  
age 71,  lives in [place], [place]. No DC needs.  
/  
   
   
                                Pre-op testing  2011      10/04/2011  
   
                                                      
  
  
Overview:Formatting of this note is different from the original.  
Images from the original note were not included.  
[facility]  
PRE-OP CHECKLIST  
  
Surgeon: Deangelo Angulo M.D. Informed Consent Completed: No  
STS Score: 1.4%  
CAD: Yes - CAD on Problem List: Yes  
Is intended procedure a CABG: Yes - is a beta blocker ordered? Yes  
  
H & P completed: Yes  
  
PA/LAT: Completed CT: Completed MRI: N/A LE US: N/A  
  
Cath: Yes - reviewed: Yes Echo:Completed EKG: Completed EF %: 61  
  
PI's: N/A Carotid: Completed Mapping: N/A Dental: Completed PFT's: N/A  
  
Basename 11 0729  
WBC 7.18  
HB 13.1  
HCT 41.8  
  
INR 1.0  
CREAT 1.35  
  
UA: Normal  
HCG:N/A  
  
ABO/ABO Confirmed: Yes  
  
Blood ordered: No  
  
SA Swab: Yes - results: Pending  
  
Last Dose of Anticoagulation: vitamins  
  
Op Note: N/A  
  
Pacemaker Check: N/A  
  
Consults: GI 2001  
  
DM: No  
  
Cardiac Surgical prep: No  
  
SIGNATURE: Krystal Castellanos RN CHECKED BY:  
DATE of SERVICE: 2011  
TIME of SERVICE: 2:23 PM  
  
  
   
   
                                Anemia of chronic disease 2011  
   
                                Intestinal polyp 2011  
   
                                                      
  
  
Overview:Formatting of this note might be different from the original.  
Distal ileum ulcerated polyp.  
   
   
                                Nonspecific abnormal results of liver function s  
tudy 2006  
   
                                Impotence of organic origin 2006  
   
                                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  
                 2021  
  
documented as of this encounter (statuses as of 02/10/2023)  
[facility]06- History of Past illness Narrative*   
  
                                Problem         Noted Date      Resolved Date  
   
                                Medicare annual wellness visit, subsequent 2019  
   
                                Encounter for long-term (current) use of medicat  
ions 2018  
   
                                                      
  
  
Overview:Formatting of this note might be different from the original.  
Added automatically from request for surgery [id number]  
   
   
                                History of colonic polyps 2018  
   
                                                      
  
  
Overview:Formatting of this note might be different from the original.  
Added automatically from request for surgery [id number]  
   
   
                                Gastroesophageal reflux disease 2018  
   
                                                      
  
  
Overview:Formatting of this note might be different from the original.  
Added automatically from request for surgery [id number]  
   
   
                                Acute pulmonary edema 10/05/2011      10/06/2011  
   
                                                      
  
  
Overview:Formatting of this note might be different from the original.  
10/5/2011  
cardiogenic and noncardiogenic factors  
  
   
   
                                Atelectasis     10/05/2011      10/06/2011  
   
                                Hypotension     10/04/2011      10/06/2011  
   
                                                      
  
  
Overview:Formatting of this note might be different from the original.  
10/4/2011  
goal map 65-80  
   
   
                                Stress hyperglycemia 10/04/2011      10/06/2011  
   
                                                      
  
  
Overview:Formatting of this note might be different from the original.  
10/4/2011  
Perioperative insulin resistance and exacerbation of hyperglycemia.  
Control blood glucose with titration of insulin infusion  
  
10/5/2011  
adequate control  
goal FBG<180  
  
   
   
                                Post-op pain    10/04/2011      10/06/2011  
   
                                Mechanically assisted ventilation 10/04/2011      
  10/05/2011  
   
                                                      
  
  
Overview:Formatting of this note might be different from the original.  
10/4/2011  
optimize MV settings, WTE  
current setting:FiO2, 75%, peep 8  
   
   
                                Cardiac insufficiency 10/04/2011      10/05/2011  
   
                                                      
  
  
Overview:Formatting of this note might be different from the original.  
10/4/2011  
RV mild to moderate post pump  
goal CI>2.3  
   
   
                                Hypoxemia       10/04/2011      10/06/2011  
   
                                discharge planning 2011      10/19/2011  
   
                                                      
  
  
Overview:Formatting of this note might be different from the original.  
age 71,  lives in [place], [place]. No DC needs.  
/  
   
   
                                Pre-op testing  2011      10/04/2011  
   
                                                      
  
  
Overview:Formatting of this note is different from the original.  
Images from the original note were not included.  
[facility]  
PRE-OP CHECKLIST  
  
Surgeon: Deangelo Angulo M.D. Informed Consent Completed: No  
STS Score: 1.4%  
CAD: Yes - CAD on Problem List: Yes  
Is intended procedure a CABG: Yes - is a beta blocker ordered? Yes  
  
H & P completed: Yes  
  
PA/LAT: Completed CT: Completed MRI: N/A LE US: N/A  
  
Cath: Yes - reviewed: Yes Echo:Completed EKG: Completed EF %: 61  
  
PI's: N/A Carotid: Completed Mapping: N/A Dental: Completed PFT's: N/A  
  
Basename 11 0729  
WBC 7.18  
HB 13.1  
HCT 41.8  
  
INR 1.0  
CREAT 1.35  
  
UA: Normal  
HCG:N/A  
  
ABO/ABO Confirmed: Yes  
  
Blood ordered: No  
  
SA Swab: Yes - results: Pending  
  
Last Dose of Anticoagulation: vitamins  
  
Op Note: N/A  
  
Pacemaker Check: N/A  
  
Consults: GI 2001  
  
DM: No  
  
Cardiac Surgical prep: No  
  
SIGNATURE: Krystal Castellanos RN CHECKED BY:  
DATE of SERVICE: 2011  
TIME of SERVICE: 2:23 PM  
  
  
   
   
                                Anemia of chronic disease 2011  
   
                                Intestinal polyp 2011  
   
                                                      
  
  
Overview:Formatting of this note might be different from the original.  
Distal ileum ulcerated polyp.  
   
   
                                Nonspecific abnormal results of liver function s  
tudy 2006  
   
                                Impotence of organic origin 2006      09/0  
2015  
   
                                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  
                 2021  
  
documented as of this encounter (statuses as of 2023)  
[facility]06- History of Past illness Narrative*   
  
                                Problem         Noted Date      Resolved Date  
   
                                Medicare annual wellness visit, subsequent 2019  
   
                                Encounter for long-term (current) use of medicat  
ions 2018  
   
                                                      
  
  
Overview:Formatting of this note might be different from the original.  
Added automatically from request for surgery [id number]  
   
   
                                History of colonic polyps 2018  
   
                                                      
  
  
Overview:Formatting of this note might be different from the original.  
Added automatically from request for surgery [id number]  
   
   
                                Gastroesophageal reflux disease 2018  
   
                                                      
  
  
Overview:Formatting of this note might be different from the original.  
Added automatically from request for surgery [id number]  
   
   
                                Acute pulmonary edema 10/05/2011      10/06/2011  
   
                                                      
  
  
Overview:Formatting of this note might be different from the original.  
10/5/2011  
cardiogenic and noncardiogenic factors  
  
   
   
                                Atelectasis     10/05/2011      10/06/2011  
   
                                Hypotension     10/04/2011      10/06/2011  
   
                                                      
  
  
Overview:Formatting of this note might be different from the original.  
10/4/2011  
goal map 65-80  
   
   
                                Stress hyperglycemia 10/04/2011      10/06/2011  
   
                                                      
  
  
Overview:Formatting of this note might be different from the original.  
10/4/2011  
Perioperative insulin resistance and exacerbation of hyperglycemia.  
Control blood glucose with titration of insulin infusion  
  
10/5/2011  
adequate control  
goal FBG<180  
  
   
   
                                Post-op pain    10/04/2011      10/06/2011  
   
                                Mechanically assisted ventilation 10/04/2011      
  10/05/2011  
   
                                                      
  
  
Overview:Formatting of this note might be different from the original.  
10/4/2011  
optimize MV settings, WTE  
current setting:FiO2, 75%, peep 8  
   
   
                                Cardiac insufficiency 10/04/2011      10/05/2011  
   
                                                      
  
  
Overview:Formatting of this note might be different from the original.  
10/4/2011  
RV mild to moderate post pump  
goal CI>2.3  
   
   
                                Hypoxemia       10/04/2011      10/06/2011  
   
                                discharge planning 2011      10/19/2011  
   
                                                      
  
  
Overview:Formatting of this note might be different from the original.  
age 71,  lives in [place], [place]. No DC needs.  
/  
   
   
                                Pre-op testing  2011      10/04/2011  
   
                                                      
  
  
Overview:Formatting of this note is different from the original.  
Images from the original note were not included.  
[facility]  
PRE-OP CHECKLIST  
  
Surgeon: Deangelo Angulo M.D. Informed Consent Completed: No  
STS Score: 1.4%  
CAD: Yes - CAD on Problem List: Yes  
Is intended procedure a CABG: Yes - is a beta blocker ordered? Yes  
  
H & P completed: Yes  
  
PA/LAT: Completed CT: Completed MRI: N/A LE US: N/A  
  
Cath: Yes - reviewed: Yes Echo:Completed EKG: Completed EF %: 61  
  
PI's: N/A Carotid: Completed Mapping: N/A Dental: Completed PFT's: N/A  
  
Basename 11 0729  
WBC 7.18  
HB 13.1  
HCT 41.8  
  
INR 1.0  
CREAT 1.35  
  
UA: Normal  
HCG:N/A  
  
ABO/ABO Confirmed: Yes  
  
Blood ordered: No  
  
SA Swab: Yes - results: Pending  
  
Last Dose of Anticoagulation: vitamins  
  
Op Note: N/A  
  
Pacemaker Check: N/A  
  
Consults: GI 2001  
  
DM: No  
  
Cardiac Surgical prep: No  
  
SIGNATURE: Krystal Castellanos RN CHECKED BY:  
DATE of SERVICE: 2011  
TIME of SERVICE: 2:23 PM  
  
  
   
   
                                Anemia of chronic disease 2011  
   
                                Intestinal polyp 2011  
   
                                                      
  
  
Overview:Formatting of this note might be different from the original.  
Distal ileum ulcerated polyp.  
   
   
                                Nonspecific abnormal results of liver function s  
nialldy 2006  
   
                                Impotence of organic origin 2006  
   
                                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  
                 2021  
  
documented as of this encounter (statuses as of 2023)  
[facility]06- History of Past illness Narrative*   
  
                                Problem         Noted Date      Resolved Date  
   
                                Medicare annual wellness visit, subsequent 2019  
   
                                Encounter for long-term (current) use of medicat  
ions 2018  
   
                                                      
  
  
Overview:Formatting of this note might be different from the original.  
Added automatically from request for surgery [id number]  
   
   
                                History of colonic polyps 2018  
   
                                                      
  
  
Overview:Formatting of this note might be different from the original.  
Added automatically from request for surgery [id number]  
   
   
                                Gastroesophageal reflux disease 2018  
   
                                                      
  
  
Overview:Formatting of this note might be different from the original.  
Added automatically from request for surgery [id number]  
   
   
                                Acute pulmonary edema 10/05/2011      10/06/2011  
   
                                                      
  
  
Overview:Formatting of this note might be different from the original.  
10/5/2011  
cardiogenic and noncardiogenic factors  
  
   
   
                                Atelectasis     10/05/2011      10/06/2011  
   
                                Hypotension     10/04/2011      10/06/2011  
   
                                                      
  
  
Overview:Formatting of this note might be different from the original.  
10/4/2011  
goal map 65-80  
   
   
                                Stress hyperglycemia 10/04/2011      10/06/2011  
   
                                                      
  
  
Overview:Formatting of this note might be different from the original.  
10/4/2011  
Perioperative insulin resistance and exacerbation of hyperglycemia.  
Control blood glucose with titration of insulin infusion  
  
10/5/2011  
adequate control  
goal FBG<180  
  
   
   
                                Post-op pain    10/04/2011      10/06/2011  
   
                                Mechanically assisted ventilation 10/04/2011      
  10/05/2011  
   
                                                      
  
  
Overview:Formatting of this note might be different from the original.  
10/4/2011  
optimize MV settings, WTE  
current setting:FiO2, 75%, peep 8  
   
   
                                Cardiac insufficiency 10/04/2011      10/05/2011  
   
                                                      
  
  
Overview:Formatting of this note might be different from the original.  
10/4/2011  
RV mild to moderate post pump  
goal CI>2.3  
   
   
                                Hypoxemia       10/04/2011      10/06/2011  
   
                                discharge planning 2011      10/19/2011  
   
                                                      
  
  
Overview:Formatting of this note might be different from the original.  
age 71,  lives in [place], [place]. No DC needs.  
/  
   
   
                                Pre-op testing  2011      10/04/2011  
   
                                                      
  
  
Overview:Formatting of this note is different from the original.  
Images from the original note were not included.  
[facility]  
PRE-OP CHECKLIST  
  
Surgeon: Deangelo Angulo M.D. Informed Consent Completed: No  
STS Score: 1.4%  
CAD: Yes - CAD on Problem List: Yes  
Is intended procedure a CABG: Yes - is a beta blocker ordered? Yes  
  
H & P completed: Yes  
  
PA/LAT: Completed CT: Completed MRI: N/A LE US: N/A  
  
Cath: Yes - reviewed: Yes Echo:Completed EKG: Completed EF %: 61  
  
PI's: N/A Carotid: Completed Mapping: N/A Dental: Completed PFT's: N/A  
  
Basename 11 0729  
WBC 7.18  
HB 13.1  
HCT 41.8  
  
INR 1.0  
CREAT 1.35  
  
UA: Normal  
HCG:N/A  
  
ABO/ABO Confirmed: Yes  
  
Blood ordered: No  
  
SA Swab: Yes - results: Pending  
  
Last Dose of Anticoagulation: vitamins  
  
Op Note: N/A  
  
Pacemaker Check: N/A  
  
Consults: GI 2001  
  
DM: No  
  
Cardiac Surgical prep: No  
  
SIGNATURE: Krystal Castellanos RN CHECKED BY:  
DATE of SERVICE: 2011  
TIME of SERVICE: 2:23 PM  
  
  
   
   
                                Anemia of chronic disease 2011  
   
                                Intestinal polyp 2011  
   
                                                      
  
  
Overview:Formatting of this note might be different from the original.  
Distal ileum ulcerated polyp.  
   
   
                                Nonspecific abnormal results of liver function s  
tudy 2006  
   
                                Impotence of organic origin 2006  
   
                                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  
                 2021  
  
documented as of this encounter (statuses as of 2023)  
[facility]06- History of Past illness Narrative*   
  
                                Problem         Noted Date      Resolved Date  
   
                                Medicare annual wellness visit, subsequent 2019  
   
                                Encounter for long-term (current) use of medicat  
ions 2018  
   
                                                      
  
  
Overview:Formatting of this note might be different from the original.  
Added automatically from request for surgery [id number]  
   
   
                                History of colonic polyps 2018  
   
                                                      
  
  
Overview:Formatting of this note might be different from the original.  
Added automatically from request for surgery [id number]  
   
   
                                Gastroesophageal reflux disease 2018  
   
                                                      
  
  
Overview:Formatting of this note might be different from the original.  
Added automatically from request for surgery [id number]  
   
   
                                Acute pulmonary edema 10/05/2011      10/06/2011  
   
                                                      
  
  
Overview:Formatting of this note might be different from the original.  
10/5/2011  
cardiogenic and noncardiogenic factors  
  
   
   
                                Atelectasis     10/05/2011      10/06/2011  
   
                                Hypotension     10/04/2011      10/06/2011  
   
                                                      
  
  
Overview:Formatting of this note might be different from the original.  
10/4/2011  
goal map 65-80  
   
   
                                Stress hyperglycemia 10/04/2011      10/06/2011  
   
                                                      
  
  
Overview:Formatting of this note might be different from the original.  
10/4/2011  
Perioperative insulin resistance and exacerbation of hyperglycemia.  
Control blood glucose with titration of insulin infusion  
  
10/5/2011  
adequate control  
goal FBG<180  
  
   
   
                                Post-op pain    10/04/2011      10/06/2011  
   
                                Mechanically assisted ventilation 10/04/2011      
  10/05/2011  
   
                                                      
  
  
Overview:Formatting of this note might be different from the original.  
10/4/2011  
optimize MV settings, WTE  
current setting:FiO2, 75%, peep 8  
   
   
                                Cardiac insufficiency 10/04/2011      10/05/2011  
   
                                                      
  
  
Overview:Formatting of this note might be different from the original.  
10/4/2011  
RV mild to moderate post pump  
goal CI>2.3  
   
   
                                Hypoxemia       10/04/2011      10/06/2011  
   
                                discharge planning 2011      10/19/2011  
   
                                                      
  
  
Overview:Formatting of this note might be different from the original.  
age 71,  lives in [place], [place]. No DC needs.  
/  
   
   
                                Pre-op testing  2011      10/04/2011  
   
                                                      
  
  
Overview:Formatting of this note is different from the original.  
Images from the original note were not included.  
[facility]  
PRE-OP CHECKLIST  
  
Surgeon: Deangelo Angulo M.D. Informed Consent Completed: No  
STS Score: 1.4%  
CAD: Yes - CAD on Problem List: Yes  
Is intended procedure a CABG: Yes - is a beta blocker ordered? Yes  
  
H & P completed: Yes  
  
PA/LAT: Completed CT: Completed MRI: N/A LE US: N/A  
  
Cath: Yes - reviewed: Yes Echo:Completed EKG: Completed EF %: 61  
  
PI's: N/A Carotid: Completed Mapping: N/A Dental: Completed PFT's: N/A  
  
Basename 11 0729  
WBC 7.18  
HB 13.1  
HCT 41.8  
  
INR 1.0  
CREAT 1.35  
  
UA: Normal  
HCG:N/A  
  
ABO/ABO Confirmed: Yes  
  
Blood ordered: No  
  
SA Swab: Yes - results: Pending  
  
Last Dose of Anticoagulation: vitamins  
  
Op Note: N/A  
  
Pacemaker Check: N/A  
  
Consults: GI 2001  
  
DM: No  
  
Cardiac Surgical prep: No  
  
SIGNATURE: Krystal Castellanos RN CHECKED BY:  
DATE of SERVICE: 2011  
TIME of SERVICE: 2:23 PM  
  
  
   
   
                                Anemia of chronic disease 2011  
   
                                Intestinal polyp 2011  
   
                                                      
  
  
Overview:Formatting of this note might be different from the original.  
Distal ileum ulcerated polyp.  
   
   
                                Nonspecific abnormal results of liver function s  
tudy 2006  
   
                                Impotence of organic origin 2006  
   
                                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  
                 2021  
  
documented as of this encounter (statuses as of 2023)  
[facility]06- History of Past illness Narrative*   
  
                                Problem         Noted Date      Resolved Date  
   
                                Medicare annual wellness visit, subsequent 2019  
   
                                Encounter for long-term (current) use of medicat  
ions 2018  
   
                                                      
  
  
Overview:Formatting of this note might be different from the original.  
Added automatically from request for surgery [id number]  
   
   
                                History of colonic polyps 2018  
   
                                                      
  
  
Overview:Formatting of this note might be different from the original.  
Added automatically from request for surgery [id number]  
   
   
                                Gastroesophageal reflux disease 2018  
   
                                                      
  
  
Overview:Formatting of this note might be different from the original.  
Added automatically from request for surgery [id number]  
   
   
                                Acute pulmonary edema 10/05/2011      10/06/2011  
   
                                                      
  
  
Overview:Formatting of this note might be different from the original.  
10/5/2011  
cardiogenic and noncardiogenic factors  
  
   
   
                                Atelectasis     10/05/2011      10/06/2011  
   
                                Hypotension     10/04/2011      10/06/2011  
   
                                                      
  
  
Overview:Formatting of this note might be different from the original.  
10/4/2011  
goal map 65-80  
   
   
                                Stress hyperglycemia 10/04/2011      10/06/2011  
   
                                                      
  
  
Overview:Formatting of this note might be different from the original.  
10/4/2011  
Perioperative insulin resistance and exacerbation of hyperglycemia.  
Control blood glucose with titration of insulin infusion  
  
10/5/2011  
adequate control  
goal FBG<180  
  
   
   
                                Post-op pain    10/04/2011      10/06/2011  
   
                                Mechanically assisted ventilation 10/04/2011      
  10/05/2011  
   
                                                      
  
  
Overview:Formatting of this note might be different from the original.  
10/4/2011  
optimize MV settings, WTE  
current setting:FiO2, 75%, peep 8  
   
   
                                Cardiac insufficiency 10/04/2011      10/05/2011  
   
                                                      
  
  
Overview:Formatting of this note might be different from the original.  
10/4/2011  
RV mild to moderate post pump  
goal CI>2.3  
   
   
                                Hypoxemia       10/04/2011      10/06/2011  
   
                                discharge planning 2011      10/19/2011  
   
                                                      
  
  
Overview:Formatting of this note might be different from the original.  
age 71,  lives in [place], [place]. No DC needs.  
/  
   
   
                                Pre-op testing  2011      10/04/2011  
   
                                                      
  
  
Overview:Formatting of this note is different from the original.  
Images from the original note were not included.  
[facility]  
PRE-OP CHECKLIST  
  
Surgeon: Deangelo Angulo M.D. Informed Consent Completed: No  
STS Score: 1.4%  
CAD: Yes - CAD on Problem List: Yes  
Is intended procedure a CABG: Yes - is a beta blocker ordered? Yes  
  
H & P completed: Yes  
  
PA/LAT: Completed CT: Completed MRI: N/A LE US: N/A  
  
Cath: Yes - reviewed: Yes Echo:Completed EKG: Completed EF %: 61  
  
PI's: N/A Carotid: Completed Mapping: N/A Dental: Completed PFT's: N/A  
  
Basename 11 0729  
WBC 7.18  
HB 13.1  
HCT 41.8  
  
INR 1.0  
CREAT 1.35  
  
UA: Normal  
HCG:N/A  
  
ABO/ABO Confirmed: Yes  
  
Blood ordered: No  
  
SA Swab: Yes - results: Pending  
  
Last Dose of Anticoagulation: vitamins  
  
Op Note: N/A  
  
Pacemaker Check: N/A  
  
Consults: GI 2001  
  
DM: No  
  
Cardiac Surgical prep: No  
  
SIGNATURE: Krystal Castellanos RN CHECKED BY:  
DATE of SERVICE: 2011  
TIME of SERVICE: 2:23 PM  
  
  
   
   
                                Anemia of chronic disease 2011  
   
                                Intestinal polyp 2011  
   
                                                      
  
  
Overview:Formatting of this note might be different from the original.  
Distal ileum ulcerated polyp.  
   
   
                                Nonspecific abnormal results of liver function s  
tudy 2006  
   
                                Impotence of organic origin 2006  
   
                                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  
                 2021  
  
documented as of this encounter (statuses as of 2023)  
[facility]06- History of Past illness Narrative*   
  
                                Problem         Noted Date      Resolved Date  
   
                                Medicare annual wellness visit, subsequent 2019  
   
                                Encounter for long-term (current) use of medicat  
ions 2018  
   
                                                      
  
  
Overview:Formatting of this note might be different from the original.  
Added automatically from request for surgery [id number]  
   
   
                                History of colonic polyps 2018  
   
                                                      
  
  
Overview:Formatting of this note might be different from the original.  
Added automatically from request for surgery [id number]  
   
   
                                Gastroesophageal reflux disease 2018  
   
                                                      
  
  
Overview:Formatting of this note might be different from the original.  
Added automatically from request for surgery [id number]  
   
   
                                Acute pulmonary edema 10/05/2011      10/06/2011  
   
                                                      
  
  
Overview:Formatting of this note might be different from the original.  
10/5/2011  
cardiogenic and noncardiogenic factors  
  
   
   
                                Atelectasis     10/05/2011      10/06/2011  
   
                                Hypotension     10/04/2011      10/06/2011  
   
                                                      
  
  
Overview:Formatting of this note might be different from the original.  
10/4/2011  
goal map 65-80  
   
   
                                Stress hyperglycemia 10/04/2011      10/06/2011  
   
                                                      
  
  
Overview:Formatting of this note might be different from the original.  
10/4/2011  
Perioperative insulin resistance and exacerbation of hyperglycemia.  
Control blood glucose with titration of insulin infusion  
  
10/5/2011  
adequate control  
goal FBG<180  
  
   
   
                                Post-op pain    10/04/2011      10/06/2011  
   
                                Mechanically assisted ventilation 10/04/2011      
  10/05/2011  
   
                                                      
  
  
Overview:Formatting of this note might be different from the original.  
10/4/2011  
optimize MV settings, WTE  
current setting:FiO2, 75%, peep 8  
   
   
                                Cardiac insufficiency 10/04/2011      10/05/2011  
   
                                                      
  
  
Overview:Formatting of this note might be different from the original.  
10/4/2011  
RV mild to moderate post pump  
goal CI>2.3  
   
   
                                Hypoxemia       10/04/2011      10/06/2011  
   
                                discharge planning 2011      10/19/2011  
   
                                                      
  
  
Overview:Formatting of this note might be different from the original.  
age 71,  lives in [place], [place]. No DC needs.  
/  
   
   
                                Pre-op testing  2011      10/04/2011  
   
                                                      
  
  
Overview:Formatting of this note is different from the original.  
Images from the original note were not included.  
[facility]  
PRE-OP CHECKLIST  
  
Surgeon: Deangelo Angulo M.D. Informed Consent Completed: No  
STS Score: 1.4%  
CAD: Yes - CAD on Problem List: Yes  
Is intended procedure a CABG: Yes - is a beta blocker ordered? Yes  
  
H & P completed: Yes  
  
PA/LAT: Completed CT: Completed MRI: N/A LE US: N/A  
  
Cath: Yes - reviewed: Yes Echo:Completed EKG: Completed EF %: 61  
  
PI's: N/A Carotid: Completed Mapping: N/A Dental: Completed PFT's: N/A  
  
Basename 11 0729  
WBC 7.18  
HB 13.1  
HCT 41.8  
  
INR 1.0  
CREAT 1.35  
  
UA: Normal  
HCG:N/A  
  
ABO/ABO Confirmed: Yes  
  
Blood ordered: No  
  
SA Swab: Yes - results: Pending  
  
Last Dose of Anticoagulation: vitamins  
  
Op Note: N/A  
  
Pacemaker Check: N/A  
  
Consults: GI 2001  
  
DM: No  
  
Cardiac Surgical prep: No  
  
SIGNATURE: Krystal Castellanos RN CHECKED BY:  
DATE of SERVICE: 2011  
TIME of SERVICE: 2:23 PM  
  
  
   
   
                                Anemia of chronic disease 2011  
   
                                Intestinal polyp 2011  
   
                                                      
  
  
Overview:Formatting of this note might be different from the original.  
Distal ileum ulcerated polyp.  
   
   
                                Nonspecific abnormal results of liver function s  
nialldy 2006  
   
                                Impotence of organic origin 2006  
   
                                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  
                 2021  
  
documented as of this encounter (statuses as of 03/10/2023)  
[facility]06- History of Past illness Narrative*   
  
                                Problem         Noted Date      Resolved Date  
   
                                Medicare annual wellness visit, subsequent 2019  
   
                                Encounter for long-term (current) use of medicat  
ions 2018  
   
                                                      
  
  
Overview:Formatting of this note might be different from the original.  
Added automatically from request for surgery [id number]  
   
   
                                History of colonic polyps 2018  
   
                                                      
  
  
Overview:Formatting of this note might be different from the original.  
Added automatically from request for surgery [id number]  
   
   
                                Gastroesophageal reflux disease 2018  
   
                                                      
  
  
Overview:Formatting of this note might be different from the original.  
Added automatically from request for surgery [id number]  
   
   
                                Acute pulmonary edema 10/05/2011      10/06/2011  
   
                                                      
  
  
Overview:Formatting of this note might be different from the original.  
10/5/2011  
cardiogenic and noncardiogenic factors  
  
   
   
                                Atelectasis     10/05/2011      10/06/2011  
   
                                Hypotension     10/04/2011      10/06/2011  
   
                                                      
  
  
Overview:Formatting of this note might be different from the original.  
10/4/2011  
goal map 65-80  
   
   
                                Stress hyperglycemia 10/04/2011      10/06/2011  
   
                                                      
  
  
Overview:Formatting of this note might be different from the original.  
10/4/2011  
Perioperative insulin resistance and exacerbation of hyperglycemia.  
Control blood glucose with titration of insulin infusion  
  
10/5/2011  
adequate control  
goal FBG<180  
  
   
   
                                Post-op pain    10/04/2011      10/06/2011  
   
                                Mechanically assisted ventilation 10/04/2011      
  10/05/2011  
   
                                                      
  
  
Overview:Formatting of this note might be different from the original.  
10/4/2011  
optimize MV settings, WTE  
current setting:FiO2, 75%, peep 8  
   
   
                                Cardiac insufficiency 10/04/2011      10/05/2011  
   
                                                      
  
  
Overview:Formatting of this note might be different from the original.  
10/4/2011  
RV mild to moderate post pump  
goal CI>2.3  
   
   
                                Hypoxemia       10/04/2011      10/06/2011  
   
                                discharge planning 2011      10/19/2011  
   
                                                      
  
  
Overview:Formatting of this note might be different from the original.  
age 71,  lives in [place], [place]. No DC needs.  
/  
   
   
                                Pre-op testing  2011      10/04/2011  
   
                                                      
  
  
Overview:Formatting of this note is different from the original.  
Images from the original note were not included.  
[facility]  
PRE-OP CHECKLIST  
  
Surgeon: Deangelo Angulo M.D. Informed Consent Completed: No  
STS Score: 1.4%  
CAD: Yes - CAD on Problem List: Yes  
Is intended procedure a CABG: Yes - is a beta blocker ordered? Yes  
  
H & P completed: Yes  
  
PA/LAT: Completed CT: Completed MRI: N/A LE US: N/A  
  
Cath: Yes - reviewed: Yes Echo:Completed EKG: Completed EF %: 61  
  
PI's: N/A Carotid: Completed Mapping: N/A Dental: Completed PFT's: N/A  
  
Basename 11 0729  
WBC 7.18  
HB 13.1  
HCT 41.8  
  
INR 1.0  
CREAT 1.35  
  
UA: Normal  
HCG:N/A  
  
ABO/ABO Confirmed: Yes  
  
Blood ordered: No  
  
SA Swab: Yes - results: Pending  
  
Last Dose of Anticoagulation: vitamins  
  
Op Note: N/A  
  
Pacemaker Check: N/A  
  
Consults: GI 2001  
  
DM: No  
  
Cardiac Surgical prep: No  
  
SIGNATURE: Krystal Castellanos RN CHECKED BY:  
DATE of SERVICE: 2011  
TIME of SERVICE: 2:23 PM  
  
  
   
   
                                Anemia of chronic disease 2011  
   
                                Intestinal polyp 2011  
   
                                                      
  
  
Overview:Formatting of this note might be different from the original.  
Distal ileum ulcerated polyp.  
   
   
                                Nonspecific abnormal results of liver function s  
tudy 2006  
   
                                Impotence of organic origin 2006  
   
                                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  
                 2021  
  
documented as of this encounter (statuses as of 2023)  
[facility]06- History of Past illness Narrative*   
  
                                Problem         Noted Date      Resolved Date  
   
                                Medicare annual wellness visit, subsequent 2019  
   
                                Encounter for long-term (current) use of medicat  
ions 2018  
   
                                                      
  
  
Overview:Formatting of this note might be different from the original.  
Added automatically from request for surgery [id number]  
   
   
                                History of colonic polyps 2018  
   
                                                      
  
  
Overview:Formatting of this note might be different from the original.  
Added automatically from request for surgery [id number]  
   
   
                                Gastroesophageal reflux disease 2018  
   
                                                      
  
  
Overview:Formatting of this note might be different from the original.  
Added automatically from request for surgery [id number]  
   
   
                                Acute pulmonary edema 10/05/2011      10/06/2011  
   
                                                      
  
  
Overview:Formatting of this note might be different from the original.  
10/5/2011  
cardiogenic and noncardiogenic factors  
  
   
   
                                Atelectasis     10/05/2011      10/06/2011  
   
                                Hypotension     10/04/2011      10/06/2011  
   
                                                      
  
  
Overview:Formatting of this note might be different from the original.  
10/4/2011  
goal map 65-80  
   
   
                                Stress hyperglycemia 10/04/2011      10/06/2011  
   
                                                      
  
  
Overview:Formatting of this note might be different from the original.  
10/4/2011  
Perioperative insulin resistance and exacerbation of hyperglycemia.  
Control blood glucose with titration of insulin infusion  
  
10/5/2011  
adequate control  
goal FBG<180  
  
   
   
                                Post-op pain    10/04/2011      10/06/2011  
   
                                Mechanically assisted ventilation 10/04/2011      
  10/05/2011  
   
                                                      
  
  
Overview:Formatting of this note might be different from the original.  
10/4/2011  
optimize MV settings, WTE  
current setting:FiO2, 75%, peep 8  
   
   
                                Cardiac insufficiency 10/04/2011      10/05/2011  
   
                                                      
  
  
Overview:Formatting of this note might be different from the original.  
10/4/2011  
RV mild to moderate post pump  
goal CI>2.3  
   
   
                                Hypoxemia       10/04/2011      10/06/2011  
   
                                discharge planning 2011      10/19/2011  
   
                                                      
  
  
Overview:Formatting of this note might be different from the original.  
age 71,  lives in [place], [place]. No DC needs.  
/  
   
   
                                Pre-op testing  2011      10/04/2011  
   
                                                      
  
  
Overview:Formatting of this note is different from the original.  
Images from the original note were not included.  
[facility]  
PRE-OP CHECKLIST  
  
Surgeon: Deangelo Angulo M.D. Informed Consent Completed: No  
STS Score: 1.4%  
CAD: Yes - CAD on Problem List: Yes  
Is intended procedure a CABG: Yes - is a beta blocker ordered? Yes  
  
H & P completed: Yes  
  
PA/LAT: Completed CT: Completed MRI: N/A LE US: N/A  
  
Cath: Yes - reviewed: Yes Echo:Completed EKG: Completed EF %: 61  
  
PI's: N/A Carotid: Completed Mapping: N/A Dental: Completed PFT's: N/A  
  
Basename 11 0729  
WBC 7.18  
HB 13.1  
HCT 41.8  
  
INR 1.0  
CREAT 1.35  
  
UA: Normal  
HCG:N/A  
  
ABO/ABO Confirmed: Yes  
  
Blood ordered: No  
  
SA Swab: Yes - results: Pending  
  
Last Dose of Anticoagulation: vitamins  
  
Op Note: N/A  
  
Pacemaker Check: N/A  
  
Consults: GI 2001  
  
DM: No  
  
Cardiac Surgical prep: No  
  
SIGNATURE: Krystal Castellanos RN CHECKED BY:  
DATE of SERVICE: 2011  
TIME of SERVICE: 2:23 PM  
  
  
   
   
                                Anemia of chronic disease 2011  
   
                                Intestinal polyp 2011  
   
                                                      
  
  
Overview:Formatting of this note might be different from the original.  
Distal ileum ulcerated polyp.  
   
   
                                Nonspecific abnormal results of liver function s  
tudy 2006  
   
                                Impotence of organic origin 2006  
   
                                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  
                 2021  
  
documented as of this encounter (statuses as of 2023)  
[facility]06- History of Past illness Narrative*   
  
                                Problem         Noted Date      Resolved Date  
   
                                Medicare annual wellness visit, subsequent 2019  
   
                                Encounter for long-term (current) use of medicat  
ions 2018  
   
                                                      
  
  
Overview:Formatting of this note might be different from the original.  
Added automatically from request for surgery [id number]  
   
   
                                History of colonic polyps 2018  
   
                                                      
  
  
Overview:Formatting of this note might be different from the original.  
Added automatically from request for surgery [id number]  
   
   
                                Gastroesophageal reflux disease 2018  
   
                                                      
  
  
Overview:Formatting of this note might be different from the original.  
Added automatically from request for surgery [id number]  
   
   
                                Acute pulmonary edema 10/05/2011      10/06/2011  
   
                                                      
  
  
Overview:Formatting of this note might be different from the original.  
10/5/2011  
cardiogenic and noncardiogenic factors  
  
   
   
                                Atelectasis     10/05/2011      10/06/2011  
   
                                Hypotension     10/04/2011      10/06/2011  
   
                                                      
  
  
Overview:Formatting of this note might be different from the original.  
10/4/2011  
goal map 65-80  
   
   
                                Stress hyperglycemia 10/04/2011      10/06/2011  
   
                                                      
  
  
Overview:Formatting of this note might be different from the original.  
10/4/2011  
Perioperative insulin resistance and exacerbation of hyperglycemia.  
Control blood glucose with titration of insulin infusion  
  
10/5/2011  
adequate control  
goal FBG<180  
  
   
   
                                Post-op pain    10/04/2011      10/06/2011  
   
                                Mechanically assisted ventilation 10/04/2011      
  10/05/2011  
   
                                                      
  
  
Overview:Formatting of this note might be different from the original.  
10/4/2011  
optimize MV settings, WTE  
current setting:FiO2, 75%, peep 8  
   
   
                                Cardiac insufficiency 10/04/2011      10/05/2011  
   
                                                      
  
  
Overview:Formatting of this note might be different from the original.  
10/4/2011  
RV mild to moderate post pump  
goal CI>2.3  
   
   
                                Hypoxemia       10/04/2011      10/06/2011  
   
                                discharge planning 2011      10/19/2011  
   
                                                      
  
  
Overview:Formatting of this note might be different from the original.  
age 71,  lives in [place], [place]. No DC needs.  
/  
   
   
                                Pre-op testing  2011      10/04/2011  
   
                                                      
  
  
Overview:Formatting of this note is different from the original.  
Images from the original note were not included.  
[facility]  
PRE-OP CHECKLIST  
  
Surgeon: Deangelo Angulo M.D. Informed Consent Completed: No  
STS Score: 1.4%  
CAD: Yes - CAD on Problem List: Yes  
Is intended procedure a CABG: Yes - is a beta blocker ordered? Yes  
  
H & P completed: Yes  
  
PA/LAT: Completed CT: Completed MRI: N/A LE US: N/A  
  
Cath: Yes - reviewed: Yes Echo:Completed EKG: Completed EF %: 61  
  
PI's: N/A Carotid: Completed Mapping: N/A Dental: Completed PFT's: N/A  
  
Basename 11 0729  
WBC 7.18  
HB 13.1  
HCT 41.8  
  
INR 1.0  
CREAT 1.35  
  
UA: Normal  
HCG:N/A  
  
ABO/ABO Confirmed: Yes  
  
Blood ordered: No  
  
SA Swab: Yes - results: Pending  
  
Last Dose of Anticoagulation: vitamins  
  
Op Note: N/A  
  
Pacemaker Check: N/A  
  
Consults: GI 2001  
  
DM: No  
  
Cardiac Surgical prep: No  
  
SIGNATURE: Krystal Castellanos RN CHECKED BY:  
DATE of SERVICE: 2011  
TIME of SERVICE: 2:23 PM  
  
  
   
   
                                Anemia of chronic disease 2011  
   
                                Intestinal polyp 2011  
   
                                                      
  
  
Overview:Formatting of this note might be different from the original.  
Distal ileum ulcerated polyp.  
   
   
                                Nonspecific abnormal results of liver function s  
tudy 2006  
   
                                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  
                 2021  
  
documented as of this encounter (statuses as of 2023)  
[facility]06- History of Past illness Narrative*   
  
                                Problem         Noted Date      Resolved Date  
   
                                Medicare annual wellness visit, subsequent 2019  
   
                                Encounter for long-term (current) use of medicat  
ions 2018  
   
                                                      
  
  
Overview:Formatting of this note might be different from the original.  
Added automatically from request for surgery [id number]  
   
   
                                History of colonic polyps 2018  
   
                                                      
  
  
Overview:Formatting of this note might be different from the original.  
Added automatically from request for surgery [id number]  
   
   
                                Gastroesophageal reflux disease 2018  
   
                                                      
  
  
Overview:Formatting of this note might be different from the original.  
Added automatically from request for surgery [id number]  
   
   
                                Acute pulmonary edema 10/05/2011      10/06/2011  
   
                                                      
  
  
Overview:Formatting of this note might be different from the original.  
10/5/2011  
cardiogenic and noncardiogenic factors  
  
   
   
                                Atelectasis     10/05/2011      10/06/2011  
   
                                Hypotension     10/04/2011      10/06/2011  
   
                                                      
  
  
Overview:Formatting of this note might be different from the original.  
10/4/2011  
goal map 65-80  
   
   
                                Stress hyperglycemia 10/04/2011      10/06/2011  
   
                                                      
  
  
Overview:Formatting of this note might be different from the original.  
10/4/2011  
Perioperative insulin resistance and exacerbation of hyperglycemia.  
Control blood glucose with titration of insulin infusion  
  
10/5/2011  
adequate control  
goal FBG<180  
  
   
   
                                Post-op pain    10/04/2011      10/06/2011  
   
                                Mechanically assisted ventilation 10/04/2011      
  10/05/2011  
   
                                                      
  
  
Overview:Formatting of this note might be different from the original.  
10/4/2011  
optimize MV settings, WTE  
current setting:FiO2, 75%, peep 8  
   
   
                                Cardiac insufficiency 10/04/2011      10/05/2011  
   
                                                      
  
  
Overview:Formatting of this note might be different from the original.  
10/4/2011  
RV mild to moderate post pump  
goal CI>2.3  
   
   
                                Hypoxemia       10/04/2011      10/06/2011  
   
                                discharge planning 2011      10/19/2011  
   
                                                      
  
  
Overview:Formatting of this note might be different from the original.  
age 71,  lives in [place], [place]. No DC needs.  
/  
   
   
                                Pre-op testing  2011      10/04/2011  
   
                                                      
  
  
Overview:Formatting of this note is different from the original.  
Images from the original note were not included.  
[facility]  
PRE-OP CHECKLIST  
  
Surgeon: Deangelo Angulo M.D. Informed Consent Completed: No  
STS Score: 1.4%  
CAD: Yes - CAD on Problem List: Yes  
Is intended procedure a CABG: Yes - is a beta blocker ordered? Yes  
  
H & P completed: Yes  
  
PA/LAT: Completed CT: Completed MRI: N/A LE US: N/A  
  
Cath: Yes - reviewed: Yes Echo:Completed EKG: Completed EF %: 61  
  
PI's: N/A Carotid: Completed Mapping: N/A Dental: Completed PFT's: N/A  
  
Basename 11 0729  
WBC 7.18  
HB 13.1  
HCT 41.8  
  
INR 1.0  
CREAT 1.35  
  
UA: Normal  
HCG:N/A  
  
ABO/ABO Confirmed: Yes  
  
Blood ordered: No  
  
SA Swab: Yes - results: Pending  
  
Last Dose of Anticoagulation: vitamins  
  
Op Note: N/A  
  
Pacemaker Check: N/A  
  
Consults: GI 2001  
  
DM: No  
  
Cardiac Surgical prep: No  
  
SIGNATURE: Krystal Castellanos RN CHECKED BY:  
DATE of SERVICE: 2011  
TIME of SERVICE: 2:23 PM  
  
  
   
   
                                Anemia of chronic disease 2011  
   
                                Intestinal polyp 2011  
   
                                                      
  
  
Overview:Formatting of this note might be different from the original.  
Distal ileum ulcerated polyp.  
   
   
                                Nonspecific abnormal results of liver function s  
pepe 2006  
   
                                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  
                 2021  
  
documented as of this encounter (statuses as of 2023)  
[facility]06- History of Past illness Narrative*   
  
                                Problem         Noted Date      Resolved Date  
   
                                Medicare annual wellness visit, subsequent 2019  
   
                                Encounter for long-term (current) use of medicat  
ions 2018  
   
                                                      
  
  
Overview:Formatting of this note might be different from the original.  
Added automatically from request for surgery [id number]  
   
   
                                History of colonic polyps 2018  
   
                                                      
  
  
Overview:Formatting of this note might be different from the original.  
Added automatically from request for surgery [id number]  
   
   
                                Gastroesophageal reflux disease 2018  
   
                                                      
  
  
Overview:Formatting of this note might be different from the original.  
Added automatically from request for surgery [id number]  
   
   
                                Acute pulmonary edema 10/05/2011      10/06/2011  
   
                                                      
  
  
Overview:Formatting of this note might be different from the original.  
10/5/2011  
cardiogenic and noncardiogenic factors  
  
   
   
                                Atelectasis     10/05/2011      10/06/2011  
   
                                Hypotension     10/04/2011      10/06/2011  
   
                                                      
  
  
Overview:Formatting of this note might be different from the original.  
10/4/2011  
goal map 65-80  
   
   
                                Stress hyperglycemia 10/04/2011      10/06/2011  
   
                                                      
  
  
Overview:Formatting of this note might be different from the original.  
10/4/2011  
Perioperative insulin resistance and exacerbation of hyperglycemia.  
Control blood glucose with titration of insulin infusion  
  
10/5/2011  
adequate control  
goal FBG<180  
  
   
   
                                Post-op pain    10/04/2011      10/06/2011  
   
                                Mechanically assisted ventilation 10/04/2011      
  10/05/2011  
   
                                                      
  
  
Overview:Formatting of this note might be different from the original.  
10/4/2011  
optimize MV settings, WTE  
current setting:FiO2, 75%, peep 8  
   
   
                                Cardiac insufficiency 10/04/2011      10/05/2011  
   
                                                      
  
  
Overview:Formatting of this note might be different from the original.  
10/4/2011  
RV mild to moderate post pump  
goal CI>2.3  
   
   
                                Hypoxemia       10/04/2011      10/06/2011  
   
                                discharge planning 2011      10/19/2011  
   
                                                      
  
  
Overview:Formatting of this note might be different from the original.  
age 71,  lives in [place], [place]. No DC needs.  
/  
   
   
                                Pre-op testing  2011      10/04/2011  
   
                                                      
  
  
Overview:Formatting of this note is different from the original.  
Images from the original note were not included.  
[facility]  
PRE-OP CHECKLIST  
  
Surgeon: Deangelo Angulo M.D. Informed Consent Completed: No  
STS Score: 1.4%  
CAD: Yes - CAD on Problem List: Yes  
Is intended procedure a CABG: Yes - is a beta blocker ordered? Yes  
  
H & P completed: Yes  
  
PA/LAT: Completed CT: Completed MRI: N/A LE US: N/A  
  
Cath: Yes - reviewed: Yes Echo:Completed EKG: Completed EF %: 61  
  
PI's: N/A Carotid: Completed Mapping: N/A Dental: Completed PFT's: N/A  
  
Basename 11 0729  
WBC 7.18  
HB 13.1  
HCT 41.8  
  
INR 1.0  
CREAT 1.35  
  
UA: Normal  
HCG:N/A  
  
ABO/ABO Confirmed: Yes  
  
Blood ordered: No  
  
SA Swab: Yes - results: Pending  
  
Last Dose of Anticoagulation: vitamins  
  
Op Note: N/A  
  
Pacemaker Check: N/A  
  
Consults: GI 2001  
  
DM: No  
  
Cardiac Surgical prep: No  
  
SIGNATURE: Krystal Castellanos RN CHECKED BY:  
DATE of SERVICE: 2011  
TIME of SERVICE: 2:23 PM  
  
  
   
   
                                Anemia of chronic disease 2011  
   
                                Intestinal polyp 2011  
   
                                                      
  
  
Overview:Formatting of this note might be different from the original.  
Distal ileum ulcerated polyp.  
   
   
                                Nonspecific abnormal results of liver function s  
tudy 2006  
   
                                Impotence of organic origin 2006  
   
                                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  
                 2021  
  
documented as of this encounter (statuses as of 2023)  
[facility]06- History of Past illness Narrative*   
  
                                Problem         Noted Date      Resolved Date  
   
                                Medicare annual wellness visit, subsequent 2019  
   
                                Encounter for long-term (current) use of medicat  
ions 2018  
   
                                                      
  
  
Overview:Formatting of this note might be different from the original.  
Added automatically from request for surgery [id number]  
   
   
                                History of colonic polyps 2018  
   
                                                      
  
  
Overview:Formatting of this note might be different from the original.  
Added automatically from request for surgery [id number]  
   
   
                                Gastroesophageal reflux disease 2018  
   
                                                      
  
  
Overview:Formatting of this note might be different from the original.  
Added automatically from request for surgery [id number]  
   
   
                                Acute pulmonary edema 10/05/2011      10/06/2011  
   
                                                      
  
  
Overview:Formatting of this note might be different from the original.  
10/5/2011  
cardiogenic and noncardiogenic factors  
  
   
   
                                Atelectasis     10/05/2011      10/06/2011  
   
                                Hypotension     10/04/2011      10/06/2011  
   
                                                      
  
  
Overview:Formatting of this note might be different from the original.  
10/4/2011  
goal map 65-80  
   
   
                                Stress hyperglycemia 10/04/2011      10/06/2011  
   
                                                      
  
  
Overview:Formatting of this note might be different from the original.  
10/4/2011  
Perioperative insulin resistance and exacerbation of hyperglycemia.  
Control blood glucose with titration of insulin infusion  
  
10/5/2011  
adequate control  
goal FBG<180  
  
   
   
                                Post-op pain    10/04/2011      10/06/2011  
   
                                Mechanically assisted ventilation 10/04/2011      
  10/05/2011  
   
                                                      
  
  
Overview:Formatting of this note might be different from the original.  
10/4/2011  
optimize MV settings, WTE  
current setting:FiO2, 75%, peep 8  
   
   
                                Cardiac insufficiency 10/04/2011      10/05/2011  
   
                                                      
  
  
Overview:Formatting of this note might be different from the original.  
10/4/2011  
RV mild to moderate post pump  
goal CI>2.3  
   
   
                                Hypoxemia       10/04/2011      10/06/2011  
   
                                discharge planning 2011      10/19/2011  
   
                                                      
  
  
Overview:Formatting of this note might be different from the original.  
age 71,  lives in [place], [place]. No DC needs.  
/  
   
   
                                Pre-op testing  2011      10/04/2011  
   
                                                      
  
  
Overview:Formatting of this note is different from the original.  
Images from the original note were not included.  
[facility]  
PRE-OP CHECKLIST  
  
Surgeon: Deangelo Angulo M.D. Informed Consent Completed: No  
STS Score: 1.4%  
CAD: Yes - CAD on Problem List: Yes  
Is intended procedure a CABG: Yes - is a beta blocker ordered? Yes  
  
H & P completed: Yes  
  
PA/LAT: Completed CT: Completed MRI: N/A LE US: N/A  
  
Cath: Yes - reviewed: Yes Echo:Completed EKG: Completed EF %: 61  
  
PI's: N/A Carotid: Completed Mapping: N/A Dental: Completed PFT's: N/A  
  
Basename 11 0729  
WBC 7.18  
HB 13.1  
HCT 41.8  
  
INR 1.0  
CREAT 1.35  
  
UA: Normal  
HCG:N/A  
  
ABO/ABO Confirmed: Yes  
  
Blood ordered: No  
  
SA Swab: Yes - results: Pending  
  
Last Dose of Anticoagulation: vitamins  
  
Op Note: N/A  
  
Pacemaker Check: N/A  
  
Consults: GI 2001  
  
DM: No  
  
Cardiac Surgical prep: No  
  
SIGNATURE: Krystal Castellanos RN CHECKED BY:  
DATE of SERVICE: 2011  
TIME of SERVICE: 2:23 PM  
  
  
   
   
                                Anemia of chronic disease 2011  
   
                                Intestinal polyp 2011  
   
                                                      
  
  
Overview:Formatting of this note might be different from the original.  
Distal ileum ulcerated polyp.  
   
   
                                Nonspecific abnormal results of liver function s  
tudy 2006  
   
                                Impotence of organic origin 2006  
   
                                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  
                 2021  
  
documented as of this encounter (statuses as of 2023)  
[facility]06- History of Past illness Narrative*   
  
                                Problem         Noted Date      Resolved Date  
   
                                Medicare annual wellness visit, subsequent 2019  
   
                                Encounter for long-term (current) use of medicat  
ions 2018  
   
                                                      
  
  
Overview:Formatting of this note might be different from the original.  
Added automatically from request for surgery [id number]  
   
   
                                History of colonic polyps 2018  
   
                                                      
  
  
Overview:Formatting of this note might be different from the original.  
Added automatically from request for surgery [id number]  
   
   
                                Gastroesophageal reflux disease 2018  
   
                                                      
  
  
Overview:Formatting of this note might be different from the original.  
Added automatically from request for surgery [id number]  
   
   
                                Acute pulmonary edema 10/05/2011      10/06/2011  
   
                                                      
  
  
Overview:Formatting of this note might be different from the original.  
10/5/2011  
cardiogenic and noncardiogenic factors  
  
   
   
                                Atelectasis     10/05/2011      10/06/2011  
   
                                Hypotension     10/04/2011      10/06/2011  
   
                                                      
  
  
Overview:Formatting of this note might be different from the original.  
10/4/2011  
goal map 65-80  
   
   
                                Stress hyperglycemia 10/04/2011      10/06/2011  
   
                                                      
  
  
Overview:Formatting of this note might be different from the original.  
10/4/2011  
Perioperative insulin resistance and exacerbation of hyperglycemia.  
Control blood glucose with titration of insulin infusion  
  
10/5/2011  
adequate control  
goal FBG<180  
  
   
   
                                Post-op pain    10/04/2011      10/06/2011  
   
                                Mechanically assisted ventilation 10/04/2011      
  10/05/2011  
   
                                                      
  
  
Overview:Formatting of this note might be different from the original.  
10/4/2011  
optimize MV settings, WTE  
current setting:FiO2, 75%, peep 8  
   
   
                                Cardiac insufficiency 10/04/2011      10/05/2011  
   
                                                      
  
  
Overview:Formatting of this note might be different from the original.  
10/4/2011  
RV mild to moderate post pump  
goal CI>2.3  
   
   
                                Hypoxemia       10/04/2011      10/06/2011  
   
                                discharge planning 2011      10/19/2011  
   
                                                      
  
  
Overview:Formatting of this note might be different from the original.  
age 71,  lives in [place], [place]. No DC needs.  
/  
   
   
                                Pre-op testing  2011      10/04/2011  
   
                                                      
  
  
Overview:Formatting of this note is different from the original.  
Images from the original note were not included.  
[facility]  
PRE-OP CHECKLIST  
  
Surgeon: Deangelo Angulo M.D. Informed Consent Completed: No  
STS Score: 1.4%  
CAD: Yes - CAD on Problem List: Yes  
Is intended procedure a CABG: Yes - is a beta blocker ordered? Yes  
  
H & P completed: Yes  
  
PA/LAT: Completed CT: Completed MRI: N/A LE US: N/A  
  
Cath: Yes - reviewed: Yes Echo:Completed EKG: Completed EF %: 61  
  
PI's: N/A Carotid: Completed Mapping: N/A Dental: Completed PFT's: N/A  
  
Basename 11 0729  
WBC 7.18  
HB 13.1  
HCT 41.8  
  
INR 1.0  
CREAT 1.35  
  
UA: Normal  
HCG:N/A  
  
ABO/ABO Confirmed: Yes  
  
Blood ordered: No  
  
SA Swab: Yes - results: Pending  
  
Last Dose of Anticoagulation: vitamins  
  
Op Note: N/A  
  
Pacemaker Check: N/A  
  
Consults: GI 2001  
  
DM: No  
  
Cardiac Surgical prep: No  
  
SIGNATURE: Krystal Castellanos RN CHECKED BY:  
DATE of SERVICE: 2011  
TIME of SERVICE: 2:23 PM  
  
  
   
   
                                Anemia of chronic disease 2011  
   
                                Intestinal polyp 2011  
   
                                                      
  
  
Overview:Formatting of this note might be different from the original.  
Distal ileum ulcerated polyp.  
   
   
                                Nonspecific abnormal results of liver function s  
pepe 2006  
   
                                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  
                 2021  
  
documented as of this encounter (statuses as of 2023)  
[facility]06- History of Past illness Narrative*   
  
                                Problem         Noted Date      Resolved Date  
   
                                Medicare annual wellness visit, subsequent 2019  
   
                                Encounter for long-term (current) use of medicat  
ions 2018  
   
                                                      
  
  
Overview:Formatting of this note might be different from the original.  
Added automatically from request for surgery [id number]  
   
   
                                History of colonic polyps 2018  
   
                                                      
  
  
Overview:Formatting of this note might be different from the original.  
Added automatically from request for surgery [id number]  
   
   
                                Gastroesophageal reflux disease 2018  
   
                                                      
  
  
Overview:Formatting of this note might be different from the original.  
Added automatically from request for surgery [id number]  
   
   
                                Acute pulmonary edema 10/05/2011      10/06/2011  
   
                                                      
  
  
Overview:Formatting of this note might be different from the original.  
10/5/2011  
cardiogenic and noncardiogenic factors  
  
   
   
                                Atelectasis     10/05/2011      10/06/2011  
   
                                Hypotension     10/04/2011      10/06/2011  
   
                                                      
  
  
Overview:Formatting of this note might be different from the original.  
10/4/2011  
goal map 65-80  
   
   
                                Stress hyperglycemia 10/04/2011      10/06/2011  
   
                                                      
  
  
Overview:Formatting of this note might be different from the original.  
10/4/2011  
Perioperative insulin resistance and exacerbation of hyperglycemia.  
Control blood glucose with titration of insulin infusion  
  
10/5/2011  
adequate control  
goal FBG<180  
  
   
   
                                Post-op pain    10/04/2011      10/06/2011  
   
                                Mechanically assisted ventilation 10/04/2011      
  10/05/2011  
   
                                                      
  
  
Overview:Formatting of this note might be different from the original.  
10/4/2011  
optimize MV settings, WTE  
current setting:FiO2, 75%, peep 8  
   
   
                                Cardiac insufficiency 10/04/2011      10/05/2011  
   
                                                      
  
  
Overview:Formatting of this note might be different from the original.  
10/4/2011  
RV mild to moderate post pump  
goal CI>2.3  
   
   
                                Hypoxemia       10/04/2011      10/06/2011  
   
                                discharge planning 2011      10/19/2011  
   
                                                      
  
  
Overview:Formatting of this note might be different from the original.  
age 71,  lives in [place], [place]. No DC needs.  
/  
   
   
                                Pre-op testing  2011      10/04/2011  
   
                                                      
  
  
Overview:Formatting of this note is different from the original.  
Images from the original note were not included.  
[facility]  
PRE-OP CHECKLIST  
  
Surgeon: Deangelo Angulo M.D. Informed Consent Completed: No  
STS Score: 1.4%  
CAD: Yes - CAD on Problem List: Yes  
Is intended procedure a CABG: Yes - is a beta blocker ordered? Yes  
  
H & P completed: Yes  
  
PA/LAT: Completed CT: Completed MRI: N/A LE US: N/A  
  
Cath: Yes - reviewed: Yes Echo:Completed EKG: Completed EF %: 61  
  
PI's: N/A Carotid: Completed Mapping: N/A Dental: Completed PFT's: N/A  
  
Basename 11 0729  
WBC 7.18  
HB 13.1  
HCT 41.8  
  
INR 1.0  
CREAT 1.35  
  
UA: Normal  
HCG:N/A  
  
ABO/ABO Confirmed: Yes  
  
Blood ordered: No  
  
SA Swab: Yes - results: Pending  
  
Last Dose of Anticoagulation: vitamins  
  
Op Note: N/A  
  
Pacemaker Check: N/A  
  
Consults: GI 2001  
  
DM: No  
  
Cardiac Surgical prep: No  
  
SIGNATURE: Krystal Castellanos RN CHECKED BY:  
DATE of SERVICE: 2011  
TIME of SERVICE: 2:23 PM  
  
  
   
   
                                Anemia of chronic disease 2011  
   
                                Intestinal polyp 2011  
   
                                                      
  
  
Overview:Formatting of this note might be different from the original.  
Distal ileum ulcerated polyp.  
   
   
                                Nonspecific abnormal results of liver function s  
pepe 2006  
   
                                Impotence of organic origin 2006  
   
                                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  
                 2021  
  
documented as of this encounter (statuses as of 2023)  
[facility]06- History of Past illness Narrative*   
  
                                Problem         Noted Date      Resolved Date  
   
                                Medicare annual wellness visit, subsequent 2019  
   
                                Encounter for long-term (current) use of medicat  
ions 2018  
   
                                                      
  
  
Overview:Formatting of this note might be different from the original.  
Added automatically from request for surgery [id number]  
   
   
                                History of colonic polyps 2018  
   
                                                      
  
  
Overview:Formatting of this note might be different from the original.  
Added automatically from request for surgery [id number]  
   
   
                                Gastroesophageal reflux disease 2018  
   
                                                      
  
  
Overview:Formatting of this note might be different from the original.  
Added automatically from request for surgery [id number]  
   
   
                                Acute pulmonary edema 10/05/2011      10/06/2011  
   
                                                      
  
  
Overview:Formatting of this note might be different from the original.  
10/5/2011  
cardiogenic and noncardiogenic factors  
  
   
   
                                Atelectasis     10/05/2011      10/06/2011  
   
                                Hypotension     10/04/2011      10/06/2011  
   
                                                      
  
  
Overview:Formatting of this note might be different from the original.  
10/4/2011  
goal map 65-80  
   
   
                                Stress hyperglycemia 10/04/2011      10/06/2011  
   
                                                      
  
  
Overview:Formatting of this note might be different from the original.  
10/4/2011  
Perioperative insulin resistance and exacerbation of hyperglycemia.  
Control blood glucose with titration of insulin infusion  
  
10/5/2011  
adequate control  
goal FBG<180  
  
   
   
                                Post-op pain    10/04/2011      10/06/2011  
   
                                Mechanically assisted ventilation 10/04/2011      
  10/05/2011  
   
                                                      
  
  
Overview:Formatting of this note might be different from the original.  
10/4/2011  
optimize MV settings, WTE  
current setting:FiO2, 75%, peep 8  
   
   
                                Cardiac insufficiency 10/04/2011      10/05/2011  
   
                                                      
  
  
Overview:Formatting of this note might be different from the original.  
10/4/2011  
RV mild to moderate post pump  
goal CI>2.3  
   
   
                                Hypoxemia       10/04/2011      10/06/2011  
   
                                discharge planning 2011      10/19/2011  
   
                                                      
  
  
Overview:Formatting of this note might be different from the original.  
age 71,  lives in [place], [place]. No DC needs.  
/  
   
   
                                Pre-op testing  2011      10/04/2011  
   
                                                      
  
  
Overview:Formatting of this note is different from the original.  
Images from the original note were not included.  
[facility]  
PRE-OP CHECKLIST  
  
Surgeon: Deangelo Angulo M.D. Informed Consent Completed: No  
STS Score: 1.4%  
CAD: Yes - CAD on Problem List: Yes  
Is intended procedure a CABG: Yes - is a beta blocker ordered? Yes  
  
H & P completed: Yes  
  
PA/LAT: Completed CT: Completed MRI: N/A LE US: N/A  
  
Cath: Yes - reviewed: Yes Echo:Completed EKG: Completed EF %: 61  
  
PI's: N/A Carotid: Completed Mapping: N/A Dental: Completed PFT's: N/A  
  
Basename 11 0729  
WBC 7.18  
HB 13.1  
HCT 41.8  
  
INR 1.0  
CREAT 1.35  
  
UA: Normal  
HCG:N/A  
  
ABO/ABO Confirmed: Yes  
  
Blood ordered: No  
  
SA Swab: Yes - results: Pending  
  
Last Dose of Anticoagulation: vitamins  
  
Op Note: N/A  
  
Pacemaker Check: N/A  
  
Consults: GI 2001  
  
DM: No  
  
Cardiac Surgical prep: No  
  
SIGNATURE: Krystal Castellanos RN CHECKED BY:  
DATE of SERVICE: 2011  
TIME of SERVICE: 2:23 PM  
  
  
   
   
                                Anemia of chronic disease 2011  
   
                                Intestinal polyp 2011  
   
                                                      
  
  
Overview:Formatting of this note might be different from the original.  
Distal ileum ulcerated polyp.  
   
   
                                Nonspecific abnormal results of liver function s  
tudy 2006  
   
                                Impotence of organic origin 2006  
   
                                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  
                 2021  
  
documented as of this encounter (statuses as of 2023)  
[facility]06- History of Past illness Narrative*   
  
                                Problem         Noted Date      Resolved Date  
   
                                Medicare annual wellness visit, subsequent 2019  
   
                                Encounter for long-term (current) use of medicat  
ions 2018  
   
                                                      
  
  
Overview:Formatting of this note might be different from the original.  
Added automatically from request for surgery [id number]  
   
   
                                History of colonic polyps 2018  
   
                                                      
  
  
Overview:Formatting of this note might be different from the original.  
Added automatically from request for surgery [id number]  
   
   
                                Gastroesophageal reflux disease 2018  
   
                                                      
  
  
Overview:Formatting of this note might be different from the original.  
Added automatically from request for surgery [id number]  
   
   
                                Acute pulmonary edema 10/05/2011      10/06/2011  
   
                                                      
  
  
Overview:Formatting of this note might be different from the original.  
10/5/2011  
cardiogenic and noncardiogenic factors  
  
   
   
                                Atelectasis     10/05/2011      10/06/2011  
   
                                Hypotension     10/04/2011      10/06/2011  
   
                                                      
  
  
Overview:Formatting of this note might be different from the original.  
10/4/2011  
goal map 65-80  
   
   
                                Stress hyperglycemia 10/04/2011      10/06/2011  
   
                                                      
  
  
Overview:Formatting of this note might be different from the original.  
10/4/2011  
Perioperative insulin resistance and exacerbation of hyperglycemia.  
Control blood glucose with titration of insulin infusion  
  
10/5/2011  
adequate control  
goal FBG<180  
  
   
   
                                Post-op pain    10/04/2011      10/06/2011  
   
                                Mechanically assisted ventilation 10/04/2011      
  10/05/2011  
   
                                                      
  
  
Overview:Formatting of this note might be different from the original.  
10/4/2011  
optimize MV settings, WTE  
current setting:FiO2, 75%, peep 8  
   
   
                                Cardiac insufficiency 10/04/2011      10/05/2011  
   
                                                      
  
  
Overview:Formatting of this note might be different from the original.  
10/4/2011  
RV mild to moderate post pump  
goal CI>2.3  
   
   
                                Hypoxemia       10/04/2011      10/06/2011  
   
                                discharge planning 2011      10/19/2011  
   
                                                      
  
  
Overview:Formatting of this note might be different from the original.  
age 71,  lives in [place], [place]. No DC needs.  
/  
   
   
                                Pre-op testing  2011      10/04/2011  
   
                                                      
  
  
Overview:Formatting of this note is different from the original.  
Images from the original note were not included.  
[facility]  
PRE-OP CHECKLIST  
  
Surgeon: Deangelo Angulo M.D. Informed Consent Completed: No  
STS Score: 1.4%  
CAD: Yes - CAD on Problem List: Yes  
Is intended procedure a CABG: Yes - is a beta blocker ordered? Yes  
  
H & P completed: Yes  
  
PA/LAT: Completed CT: Completed MRI: N/A LE US: N/A  
  
Cath: Yes - reviewed: Yes Echo:Completed EKG: Completed EF %: 61  
  
PI's: N/A Carotid: Completed Mapping: N/A Dental: Completed PFT's: N/A  
  
Basename 11 0729  
WBC 7.18  
HB 13.1  
HCT 41.8  
  
INR 1.0  
CREAT 1.35  
  
UA: Normal  
HCG:N/A  
  
ABO/ABO Confirmed: Yes  
  
Blood ordered: No  
  
SA Swab: Yes - results: Pending  
  
Last Dose of Anticoagulation: vitamins  
  
Op Note: N/A  
  
Pacemaker Check: N/A  
  
Consults: GI 2001  
  
DM: No  
  
Cardiac Surgical prep: No  
  
SIGNATURE: Krystal Castellanos RN CHECKED BY:  
DATE of SERVICE: 2011  
TIME of SERVICE: 2:23 PM  
  
  
   
   
                                Anemia of chronic disease 2011  
   
                                Intestinal polyp 2011  
   
                                                      
  
  
Overview:Formatting of this note might be different from the original.  
Distal ileum ulcerated polyp.  
   
   
                                Nonspecific abnormal results of liver function s  
tudy 2006  
   
                                Impotence of organic origin 2006  
   
                                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  
                 2021  
  
documented as of this encounter (statuses as of 2023)  
[facility]06- History of Past illness Narrative*   
  
                                Problem         Noted Date      Resolved Date  
   
                                Medicare annual wellness visit, subsequent 2019  
   
                                Encounter for long-term (current) use of medicat  
ions 2018  
   
                                                      
  
  
Overview:Formatting of this note might be different from the original.  
Added automatically from request for surgery [id number]  
   
   
                                History of colonic polyps 2018  
   
                                                      
  
  
Overview:Formatting of this note might be different from the original.  
Added automatically from request for surgery [id number]  
   
   
                                Gastroesophageal reflux disease 2018  
   
                                                      
  
  
Overview:Formatting of this note might be different from the original.  
Added automatically from request for surgery [id number]  
   
   
                                Acute pulmonary edema 10/05/2011      10/06/2011  
   
                                                      
  
  
Overview:Formatting of this note might be different from the original.  
10/5/2011  
cardiogenic and noncardiogenic factors  
  
   
   
                                Atelectasis     10/05/2011      10/06/2011  
   
                                Hypotension     10/04/2011      10/06/2011  
   
                                                      
  
  
Overview:Formatting of this note might be different from the original.  
10/4/2011  
goal map 65-80  
   
   
                                Stress hyperglycemia 10/04/2011      10/06/2011  
   
                                                      
  
  
Overview:Formatting of this note might be different from the original.  
10/4/2011  
Perioperative insulin resistance and exacerbation of hyperglycemia.  
Control blood glucose with titration of insulin infusion  
  
10/5/2011  
adequate control  
goal FBG<180  
  
   
   
                                Post-op pain    10/04/2011      10/06/2011  
   
                                Mechanically assisted ventilation 10/04/2011      
  10/05/2011  
   
                                                      
  
  
Overview:Formatting of this note might be different from the original.  
10/4/2011  
optimize MV settings, WTE  
current setting:FiO2, 75%, peep 8  
   
   
                                Cardiac insufficiency 10/04/2011      10/05/2011  
   
                                                      
  
  
Overview:Formatting of this note might be different from the original.  
10/4/2011  
RV mild to moderate post pump  
goal CI>2.3  
   
   
                                Hypoxemia       10/04/2011      10/06/2011  
   
                                discharge planning 2011      10/19/2011  
   
                                                      
  
  
Overview:Formatting of this note might be different from the original.  
age 71,  lives in [place], [place]. No DC needs.  
/  
   
   
                                Pre-op testing  2011      10/04/2011  
   
                                                      
  
  
Overview:Formatting of this note is different from the original.  
Images from the original note were not included.  
[facility]  
PRE-OP CHECKLIST  
  
Surgeon: Deangelo Angulo M.D. Informed Consent Completed: No  
STS Score: 1.4%  
CAD: Yes - CAD on Problem List: Yes  
Is intended procedure a CABG: Yes - is a beta blocker ordered? Yes  
  
H & P completed: Yes  
  
PA/LAT: Completed CT: Completed MRI: N/A LE US: N/A  
  
Cath: Yes - reviewed: Yes Echo:Completed EKG: Completed EF %: 61  
  
PI's: N/A Carotid: Completed Mapping: N/A Dental: Completed PFT's: N/A  
  
Basename 11 0729  
WBC 7.18  
HB 13.1  
HCT 41.8  
  
INR 1.0  
CREAT 1.35  
  
UA: Normal  
HCG:N/A  
  
ABO/ABO Confirmed: Yes  
  
Blood ordered: No  
  
SA Swab: Yes - results: Pending  
  
Last Dose of Anticoagulation: vitamins  
  
Op Note: N/A  
  
Pacemaker Check: N/A  
  
Consults: GI 2001  
  
DM: No  
  
Cardiac Surgical prep: No  
  
SIGNATURE: Krystal Castellanos RN CHECKED BY:  
DATE of SERVICE: 2011  
TIME of SERVICE: 2:23 PM  
  
  
   
   
                                Anemia of chronic disease 2011  
   
                                Intestinal polyp 2011  
   
                                                      
  
  
Overview:Formatting of this note might be different from the original.  
Distal ileum ulcerated polyp.  
   
   
                                Nonspecific abnormal results of liver function s  
tudy 2006  
   
                                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  
                 2021  
  
documented as of this encounter (statuses as of 2023)  
[facility]06- History of Past illness Narrative*   
  
                                Problem         Noted Date      Resolved Date  
   
                                Medicare annual wellness visit, subsequent 2019  
   
                                Encounter for long-term (current) use of medicat  
ions 2018  
   
                                                      
  
  
Overview:Formatting of this note might be different from the original.  
Added automatically from request for surgery [id number]  
   
   
                                History of colonic polyps 2018  
   
                                                      
  
  
Overview:Formatting of this note might be different from the original.  
Added automatically from request for surgery [id number]  
   
   
                                Gastroesophageal reflux disease 2018  
   
                                                      
  
  
Overview:Formatting of this note might be different from the original.  
Added automatically from request for surgery [id number]  
   
   
                                Acute pulmonary edema 10/05/2011      10/06/2011  
   
                                                      
  
  
Overview:Formatting of this note might be different from the original.  
10/5/2011  
cardiogenic and noncardiogenic factors  
  
   
   
                                Atelectasis     10/05/2011      10/06/2011  
   
                                Hypotension     10/04/2011      10/06/2011  
   
                                                      
  
  
Overview:Formatting of this note might be different from the original.  
10/4/2011  
goal map 65-80  
   
   
                                Stress hyperglycemia 10/04/2011      10/06/2011  
   
                                                      
  
  
Overview:Formatting of this note might be different from the original.  
10/4/2011  
Perioperative insulin resistance and exacerbation of hyperglycemia.  
Control blood glucose with titration of insulin infusion  
  
10/5/2011  
adequate control  
goal FBG<180  
  
   
   
                                Post-op pain    10/04/2011      10/06/2011  
   
                                Mechanically assisted ventilation 10/04/2011      
  10/05/2011  
   
                                                      
  
  
Overview:Formatting of this note might be different from the original.  
10/4/2011  
optimize MV settings, WTE  
current setting:FiO2, 75%, peep 8  
   
   
                                Cardiac insufficiency 10/04/2011      10/05/2011  
   
                                                      
  
  
Overview:Formatting of this note might be different from the original.  
10/4/2011  
RV mild to moderate post pump  
goal CI>2.3  
   
   
                                Hypoxemia       10/04/2011      10/06/2011  
   
                                discharge planning 2011      10/19/2011  
   
                                                      
  
  
Overview:Formatting of this note might be different from the original.  
age 71,  lives in [place], [place]. No DC needs.  
/  
   
   
                                Pre-op testing  2011      10/04/2011  
   
                                                      
  
  
Overview:Formatting of this note is different from the original.  
Images from the original note were not included.  
[facility]  
PRE-OP CHECKLIST  
  
Surgeon: Deangelo Angulo M.D. Informed Consent Completed: No  
STS Score: 1.4%  
CAD: Yes - CAD on Problem List: Yes  
Is intended procedure a CABG: Yes - is a beta blocker ordered? Yes  
  
H & P completed: Yes  
  
PA/LAT: Completed CT: Completed MRI: N/A LE US: N/A  
  
Cath: Yes - reviewed: Yes Echo:Completed EKG: Completed EF %: 61  
  
PI's: N/A Carotid: Completed Mapping: N/A Dental: Completed PFT's: N/A  
  
Basename 11 0729  
WBC 7.18  
HB 13.1  
HCT 41.8  
  
INR 1.0  
CREAT 1.35  
  
UA: Normal  
HCG:N/A  
  
ABO/ABO Confirmed: Yes  
  
Blood ordered: No  
  
SA Swab: Yes - results: Pending  
  
Last Dose of Anticoagulation: vitamins  
  
Op Note: N/A  
  
Pacemaker Check: N/A  
  
Consults: GI 2001  
  
DM: No  
  
Cardiac Surgical prep: No  
  
SIGNATURE: Krystal Castellanos RN CHECKED BY:  
DATE of SERVICE: 2011  
TIME of SERVICE: 2:23 PM  
  
  
   
   
                                Anemia of chronic disease 2011  
   
                                Intestinal polyp 2011  
   
                                                      
  
  
Overview:Formatting of this note might be different from the original.  
Distal ileum ulcerated polyp.  
   
   
                                Nonspecific abnormal results of liver function s  
tudy 2006  
   
                                Impotence of organic origin 2006  
   
                                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  
                 2021  
  
documented as of this encounter (statuses as of 2023)  
[facility]06- History of Past illness Narrative*   
  
                                Problem         Noted Date      Resolved Date  
   
                                Medicare annual wellness visit, subsequent 2019  
   
                                Encounter for long-term (current) use of medicat  
ions 2018  
   
                                                      
  
  
Overview:Formatting of this note might be different from the original.  
Added automatically from request for surgery [id number]  
   
   
                                History of colonic polyps 2018  
   
                                                      
  
  
Overview:Formatting of this note might be different from the original.  
Added automatically from request for surgery [id number]  
   
   
                                Gastroesophageal reflux disease 2018  
   
                                                      
  
  
Overview:Formatting of this note might be different from the original.  
Added automatically from request for surgery [id number]  
   
   
                                Acute pulmonary edema 10/05/2011      10/06/2011  
   
                                                      
  
  
Overview:Formatting of this note might be different from the original.  
10/5/2011  
cardiogenic and noncardiogenic factors  
  
   
   
                                Atelectasis     10/05/2011      10/06/2011  
   
                                Hypotension     10/04/2011      10/06/2011  
   
                                                      
  
  
Overview:Formatting of this note might be different from the original.  
10/4/2011  
goal map 65-80  
   
   
                                Stress hyperglycemia 10/04/2011      10/06/2011  
   
                                                      
  
  
Overview:Formatting of this note might be different from the original.  
10/4/2011  
Perioperative insulin resistance and exacerbation of hyperglycemia.  
Control blood glucose with titration of insulin infusion  
  
10/5/2011  
adequate control  
goal FBG<180  
  
   
   
                                Post-op pain    10/04/2011      10/06/2011  
   
                                Mechanically assisted ventilation 10/04/2011      
  10/05/2011  
   
                                                      
  
  
Overview:Formatting of this note might be different from the original.  
10/4/2011  
optimize MV settings, WTE  
current setting:FiO2, 75%, peep 8  
   
   
                                Cardiac insufficiency 10/04/2011      10/05/2011  
   
                                                      
  
  
Overview:Formatting of this note might be different from the original.  
10/4/2011  
RV mild to moderate post pump  
goal CI>2.3  
   
   
                                Hypoxemia       10/04/2011      10/06/2011  
   
                                discharge planning 2011      10/19/2011  
   
                                                      
  
  
Overview:Formatting of this note might be different from the original.  
age 71,  lives in [place], [place]. No DC needs.  
/  
   
   
                                Pre-op testing  2011      10/04/2011  
   
                                                      
  
  
Overview:Formatting of this note is different from the original.  
Images from the original note were not included.  
[facility]  
PRE-OP CHECKLIST  
  
Surgeon: Deangelo Angulo M.D. Informed Consent Completed: No  
STS Score: 1.4%  
CAD: Yes - CAD on Problem List: Yes  
Is intended procedure a CABG: Yes - is a beta blocker ordered? Yes  
  
H & P completed: Yes  
  
PA/LAT: Completed CT: Completed MRI: N/A LE US: N/A  
  
Cath: Yes - reviewed: Yes Echo:Completed EKG: Completed EF %: 61  
  
PI's: N/A Carotid: Completed Mapping: N/A Dental: Completed PFT's: N/A  
  
Basename 11 0729  
WBC 7.18  
HB 13.1  
HCT 41.8  
  
INR 1.0  
CREAT 1.35  
  
UA: Normal  
HCG:N/A  
  
ABO/ABO Confirmed: Yes  
  
Blood ordered: No  
  
SA Swab: Yes - results: Pending  
  
Last Dose of Anticoagulation: vitamins  
  
Op Note: N/A  
  
Pacemaker Check: N/A  
  
Consults: GI 2001  
  
DM: No  
  
Cardiac Surgical prep: No  
  
SIGNATURE: Krystal Castellanos RN CHECKED BY:  
DATE of SERVICE: 2011  
TIME of SERVICE: 2:23 PM  
  
  
   
   
                                Anemia of chronic disease 2011  
   
                                Intestinal polyp 2011  
   
                                                      
  
  
Overview:Formatting of this note might be different from the original.  
Distal ileum ulcerated polyp.  
   
   
                                Nonspecific abnormal results of liver function s  
tudy 2006  
   
                                Impotence of organic origin 2006  
   
                                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  
                 2021  
  
documented as of this encounter (statuses as of 2023)  
[facility]06- History of Past illness Narrative*   
  
                                Problem         Noted Date      Resolved Date  
   
                                Medicare annual wellness visit, subsequent 2019  
   
                                Encounter for long-term (current) use of medicat  
ions 2018  
   
                                                      
  
  
Overview:Formatting of this note might be different from the original.  
Added automatically from request for surgery [id number]  
   
   
                                History of colonic polyps 2018  
   
                                                      
  
  
Overview:Formatting of this note might be different from the original.  
Added automatically from request for surgery [id number]  
   
   
                                Gastroesophageal reflux disease 2018  
   
                                                      
  
  
Overview:Formatting of this note might be different from the original.  
Added automatically from request for surgery [id number]  
   
   
                                Acute pulmonary edema 10/05/2011      10/06/2011  
   
                                                      
  
  
Overview:Formatting of this note might be different from the original.  
10/5/2011  
cardiogenic and noncardiogenic factors  
  
   
   
                                Atelectasis     10/05/2011      10/06/2011  
   
                                Hypotension     10/04/2011      10/06/2011  
   
                                                      
  
  
Overview:Formatting of this note might be different from the original.  
10/4/2011  
goal map 65-80  
   
   
                                Stress hyperglycemia 10/04/2011      10/06/2011  
   
                                                      
  
  
Overview:Formatting of this note might be different from the original.  
10/4/2011  
Perioperative insulin resistance and exacerbation of hyperglycemia.  
Control blood glucose with titration of insulin infusion  
  
10/5/2011  
adequate control  
goal FBG<180  
  
   
   
                                Post-op pain    10/04/2011      10/06/2011  
   
                                Mechanically assisted ventilation 10/04/2011      
  10/05/2011  
   
                                                      
  
  
Overview:Formatting of this note might be different from the original.  
10/4/2011  
optimize MV settings, WTE  
current setting:FiO2, 75%, peep 8  
   
   
                                Cardiac insufficiency 10/04/2011      10/05/2011  
   
                                                      
  
  
Overview:Formatting of this note might be different from the original.  
10/4/2011  
RV mild to moderate post pump  
goal CI>2.3  
   
   
                                Hypoxemia       10/04/2011      10/06/2011  
   
                                discharge planning 2011      10/19/2011  
   
                                                      
  
  
Overview:Formatting of this note might be different from the original.  
age 71,  lives in [place], [place]. No DC needs.  
/  
   
   
                                Pre-op testing  2011      10/04/2011  
   
                                                      
  
  
Overview:Formatting of this note is different from the original.  
Images from the original note were not included.  
[facility]  
PRE-OP CHECKLIST  
  
Surgeon: Deangelo Angulo M.D. Informed Consent Completed: No  
STS Score: 1.4%  
CAD: Yes - CAD on Problem List: Yes  
Is intended procedure a CABG: Yes - is a beta blocker ordered? Yes  
  
H & P completed: Yes  
  
PA/LAT: Completed CT: Completed MRI: N/A LE US: N/A  
  
Cath: Yes - reviewed: Yes Echo:Completed EKG: Completed EF %: 61  
  
PI's: N/A Carotid: Completed Mapping: N/A Dental: Completed PFT's: N/A  
  
Basename 11 0729  
WBC 7.18  
HB 13.1  
HCT 41.8  
  
INR 1.0  
CREAT 1.35  
  
UA: Normal  
HCG:N/A  
  
ABO/ABO Confirmed: Yes  
  
Blood ordered: No  
  
SA Swab: Yes - results: Pending  
  
Last Dose of Anticoagulation: vitamins  
  
Op Note: N/A  
  
Pacemaker Check: N/A  
  
Consults: GI 2001  
  
DM: No  
  
Cardiac Surgical prep: No  
  
SIGNATURE: Krystal Castellanos RN CHECKED BY:  
DATE of SERVICE: 2011  
TIME of SERVICE: 2:23 PM  
  
  
   
   
                                Anemia of chronic disease 2011  
   
                                Intestinal polyp 2011  
   
                                                      
  
  
Overview:Formatting of this note might be different from the original.  
Distal ileum ulcerated polyp.  
   
   
                                Nonspecific abnormal results of liver function s  
tudy 2006  
   
                                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  
                 2021  
  
documented as of this encounter (statuses as of 2023)  
[facility]06- History of Past illness Narrative*   
  
                                Problem         Noted Date      Resolved Date  
   
                                Medicare annual wellness visit, subsequent 2019  
   
                                Encounter for long-term (current) use of medicat  
ions 2018  
   
                                                      
  
  
Overview:Formatting of this note might be different from the original.  
Added automatically from request for surgery [id number]  
   
   
                                History of colonic polyps 2018  
   
                                                      
  
  
Overview:Formatting of this note might be different from the original.  
Added automatically from request for surgery [id number]  
   
   
                                Gastroesophageal reflux disease 2018  
   
                                                      
  
  
Overview:Formatting of this note might be different from the original.  
Added automatically from request for surgery [id number]  
   
   
                                Acute pulmonary edema 10/05/2011      10/06/2011  
   
                                                      
  
  
Overview:Formatting of this note might be different from the original.  
10/5/2011  
cardiogenic and noncardiogenic factors  
  
   
   
                                Atelectasis     10/05/2011      10/06/2011  
   
                                Hypotension     10/04/2011      10/06/2011  
   
                                                      
  
  
Overview:Formatting of this note might be different from the original.  
10/4/2011  
goal map 65-80  
   
   
                                Stress hyperglycemia 10/04/2011      10/06/2011  
   
                                                      
  
  
Overview:Formatting of this note might be different from the original.  
10/4/2011  
Perioperative insulin resistance and exacerbation of hyperglycemia.  
Control blood glucose with titration of insulin infusion  
  
10/5/2011  
adequate control  
goal FBG<180  
  
   
   
                                Post-op pain    10/04/2011      10/06/2011  
   
                                Mechanically assisted ventilation 10/04/2011      
  10/05/2011  
   
                                                      
  
  
Overview:Formatting of this note might be different from the original.  
10/4/2011  
optimize MV settings, WTE  
current setting:FiO2, 75%, peep 8  
   
   
                                Cardiac insufficiency 10/04/2011      10/05/2011  
   
                                                      
  
  
Overview:Formatting of this note might be different from the original.  
10/4/2011  
RV mild to moderate post pump  
goal CI>2.3  
   
   
                                Hypoxemia       10/04/2011      10/06/2011  
   
                                discharge planning 2011      10/19/2011  
   
                                                      
  
  
Overview:Formatting of this note might be different from the original.  
age 71,  lives in [place], [place]. No DC needs.  
/  
   
   
                                Pre-op testing  2011      10/04/2011  
   
                                                      
  
  
Overview:Formatting of this note is different from the original.  
Images from the original note were not included.  
[facility]  
PRE-OP CHECKLIST  
  
Surgeon: Deangelo Angulo M.D. Informed Consent Completed: No  
STS Score: 1.4%  
CAD: Yes - CAD on Problem List: Yes  
Is intended procedure a CABG: Yes - is a beta blocker ordered? Yes  
  
H & P completed: Yes  
  
PA/LAT: Completed CT: Completed MRI: N/A LE US: N/A  
  
Cath: Yes - reviewed: Yes Echo:Completed EKG: Completed EF %: 61  
  
PI's: N/A Carotid: Completed Mapping: N/A Dental: Completed PFT's: N/A  
  
Basename 11 0729  
WBC 7.18  
HB 13.1  
HCT 41.8  
  
INR 1.0  
CREAT 1.35  
  
UA: Normal  
HCG:N/A  
  
ABO/ABO Confirmed: Yes  
  
Blood ordered: No  
  
SA Swab: Yes - results: Pending  
  
Last Dose of Anticoagulation: vitamins  
  
Op Note: N/A  
  
Pacemaker Check: N/A  
  
Consults: GI 2001  
  
DM: No  
  
Cardiac Surgical prep: No  
  
SIGNATURE: Krystal Castellanos RN CHECKED BY:  
DATE of SERVICE: 2011  
TIME of SERVICE: 2:23 PM  
  
  
   
   
                                Anemia of chronic disease 2011  
   
                                Intestinal polyp 2011  
   
                                                      
  
  
Overview:Formatting of this note might be different from the original.  
Distal ileum ulcerated polyp.  
   
   
                                Nonspecific abnormal results of liver function s  
tudy 2006  
   
                                Impotence of organic origin 2006      09/0  
2015  
   
                                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  
                 2021  
  
documented as of this encounter (statuses as of 2023)  
[facility]06- History of Past illness Narrative*   
  
                                Problem         Noted Date      Resolved Date  
   
                                Medicare annual wellness visit, subsequent 2019  
   
                                Encounter for long-term (current) use of medicat  
ions 2018  
   
                                                      
  
  
Overview:Formatting of this note might be different from the original.  
Added automatically from request for surgery [id number]  
   
   
                                History of colonic polyps 2018  
   
                                                      
  
  
Overview:Formatting of this note might be different from the original.  
Added automatically from request for surgery [id number]  
   
   
                                Gastroesophageal reflux disease 2018  
   
                                                      
  
  
Overview:Formatting of this note might be different from the original.  
Added automatically from request for surgery [id number]  
   
   
                                Acute pulmonary edema 10/05/2011      10/06/2011  
   
                                                      
  
  
Overview:Formatting of this note might be different from the original.  
10/5/2011  
cardiogenic and noncardiogenic factors  
  
   
   
                                Atelectasis     10/05/2011      10/06/2011  
   
                                Hypotension     10/04/2011      10/06/2011  
   
                                                      
  
  
Overview:Formatting of this note might be different from the original.  
10/4/2011  
goal map 65-80  
   
   
                                Stress hyperglycemia 10/04/2011      10/06/2011  
   
                                                      
  
  
Overview:Formatting of this note might be different from the original.  
10/4/2011  
Perioperative insulin resistance and exacerbation of hyperglycemia.  
Control blood glucose with titration of insulin infusion  
  
10/5/2011  
adequate control  
goal FBG<180  
  
   
   
                                Post-op pain    10/04/2011      10/06/2011  
   
                                Mechanically assisted ventilation 10/04/2011      
  10/05/2011  
   
                                                      
  
  
Overview:Formatting of this note might be different from the original.  
10/4/2011  
optimize MV settings, WTE  
current setting:FiO2, 75%, peep 8  
   
   
                                Cardiac insufficiency 10/04/2011      10/05/2011  
   
                                                      
  
  
Overview:Formatting of this note might be different from the original.  
10/4/2011  
RV mild to moderate post pump  
goal CI>2.3  
   
   
                                Hypoxemia       10/04/2011      10/06/2011  
   
                                discharge planning 2011      10/19/2011  
   
                                                      
  
  
Overview:Formatting of this note might be different from the original.  
age 71,  lives in [place], [place]. No DC needs.  
/  
   
   
                                Pre-op testing  2011      10/04/2011  
   
                                                      
  
  
Overview:Formatting of this note is different from the original.  
Images from the original note were not included.  
[facility]  
PRE-OP CHECKLIST  
  
Surgeon: Deangelo Angulo M.D. Informed Consent Completed: No  
STS Score: 1.4%  
CAD: Yes - CAD on Problem List: Yes  
Is intended procedure a CABG: Yes - is a beta blocker ordered? Yes  
  
H & P completed: Yes  
  
PA/LAT: Completed CT: Completed MRI: N/A LE US: N/A  
  
Cath: Yes - reviewed: Yes Echo:Completed EKG: Completed EF %: 61  
  
PI's: N/A Carotid: Completed Mapping: N/A Dental: Completed PFT's: N/A  
  
Basename 11 0729  
WBC 7.18  
HB 13.1  
HCT 41.8  
  
INR 1.0  
CREAT 1.35  
  
UA: Normal  
HCG:N/A  
  
ABO/ABO Confirmed: Yes  
  
Blood ordered: No  
  
SA Swab: Yes - results: Pending  
  
Last Dose of Anticoagulation: vitamins  
  
Op Note: N/A  
  
Pacemaker Check: N/A  
  
Consults: GI 2001  
  
DM: No  
  
Cardiac Surgical prep: No  
  
SIGNATURE: Krystal Castellanos RN CHECKED BY:  
DATE of SERVICE: 2011  
TIME of SERVICE: 2:23 PM  
  
  
   
   
                                Anemia of chronic disease 2011  
   
                                Intestinal polyp 2011  
   
                                                      
  
  
Overview:Formatting of this note might be different from the original.  
Distal ileum ulcerated polyp.  
   
   
                                Nonspecific abnormal results of liver function s  
nialldy 2006  
   
                                Impotence of organic origin 2006  
   
                                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  
                 2021  
  
documented as of this encounter (statuses as of 2023)  
[facility]06- History of Past illness Narrative*   
  
                                Problem         Noted Date      Resolved Date  
   
                                Medicare annual wellness visit, subsequent 2019  
   
                                Encounter for long-term (current) use of medicat  
ions 2018  
   
                                                      
  
  
Overview:Formatting of this note might be different from the original.  
Added automatically from request for surgery [id number]  
   
   
                                History of colonic polyps 2018  
   
                                                      
  
  
Overview:Formatting of this note might be different from the original.  
Added automatically from request for surgery [id number]  
   
   
                                Gastroesophageal reflux disease 2018  
   
                                                      
  
  
Overview:Formatting of this note might be different from the original.  
Added automatically from request for surgery [id number]  
   
   
                                Acute pulmonary edema 10/05/2011      10/06/2011  
   
                                                      
  
  
Overview:Formatting of this note might be different from the original.  
10/5/2011  
cardiogenic and noncardiogenic factors  
  
   
   
                                Atelectasis     10/05/2011      10/06/2011  
   
                                Hypotension     10/04/2011      10/06/2011  
   
                                                      
  
  
Overview:Formatting of this note might be different from the original.  
10/4/2011  
goal map 65-80  
   
   
                                Stress hyperglycemia 10/04/2011      10/06/2011  
   
                                                      
  
  
Overview:Formatting of this note might be different from the original.  
10/4/2011  
Perioperative insulin resistance and exacerbation of hyperglycemia.  
Control blood glucose with titration of insulin infusion  
  
10/5/2011  
adequate control  
goal FBG<180  
  
   
   
                                Post-op pain    10/04/2011      10/06/2011  
   
                                Mechanically assisted ventilation 10/04/2011      
  10/05/2011  
   
                                                      
  
  
Overview:Formatting of this note might be different from the original.  
10/4/2011  
optimize MV settings, WTE  
current setting:FiO2, 75%, peep 8  
   
   
                                Cardiac insufficiency 10/04/2011      10/05/2011  
   
                                                      
  
  
Overview:Formatting of this note might be different from the original.  
10/4/2011  
RV mild to moderate post pump  
goal CI>2.3  
   
   
                                Hypoxemia       10/04/2011      10/06/2011  
   
                                discharge planning 2011      10/19/2011  
   
                                                      
  
  
Overview:Formatting of this note might be different from the original.  
age 71,  lives in [place], [place]. No DC needs.  
/  
   
   
                                Pre-op testing  2011      10/04/2011  
   
                                                      
  
  
Overview:Formatting of this note is different from the original.  
Images from the original note were not included.  
[facility]  
PRE-OP CHECKLIST  
  
Surgeon: Deangelo Angulo M.D. Informed Consent Completed: No  
STS Score: 1.4%  
CAD: Yes - CAD on Problem List: Yes  
Is intended procedure a CABG: Yes - is a beta blocker ordered? Yes  
  
H & P completed: Yes  
  
PA/LAT: Completed CT: Completed MRI: N/A LE US: N/A  
  
Cath: Yes - reviewed: Yes Echo:Completed EKG: Completed EF %: 61  
  
PI's: N/A Carotid: Completed Mapping: N/A Dental: Completed PFT's: N/A  
  
Basename 11 0729  
WBC 7.18  
HB 13.1  
HCT 41.8  
  
INR 1.0  
CREAT 1.35  
  
UA: Normal  
HCG:N/A  
  
ABO/ABO Confirmed: Yes  
  
Blood ordered: No  
  
SA Swab: Yes - results: Pending  
  
Last Dose of Anticoagulation: vitamins  
  
Op Note: N/A  
  
Pacemaker Check: N/A  
  
Consults: GI 2001  
  
DM: No  
  
Cardiac Surgical prep: No  
  
SIGNATURE: Krystal Castellanos RN CHECKED BY:  
DATE of SERVICE: 2011  
TIME of SERVICE: 2:23 PM  
  
  
   
   
                                Anemia of chronic disease 2011  
   
                                Intestinal polyp 2011  
   
                                                      
  
  
Overview:Formatting of this note might be different from the original.  
Distal ileum ulcerated polyp.  
   
   
                                Nonspecific abnormal results of liver function s  
tudy 2006  
   
                                Impotence of organic origin 2006  
   
                                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  
                 2021  
  
documented as of this encounter (statuses as of 2023)  
[facility]- History of Past illness Narrative*   
  
                                Problem         Noted Date      Resolved Date  
   
                                Medicare annual wellness visit, subsequent 2019  
   
                                Encounter for long-term (current) use of medicat  
ions 2018  
   
                                                      
  
  
Overview:Formatting of this note might be different from the original.  
Added automatically from request for surgery [id number]  
   
   
                                History of colonic polyps 2018  
   
                                                      
  
  
Overview:Formatting of this note might be different from the original.  
Added automatically from request for surgery [id number]  
   
   
                                Gastroesophageal reflux disease 2018  
   
                                                      
  
  
Overview:Formatting of this note might be different from the original.  
Added automatically from request for surgery [id number]  
   
   
                                Acute pulmonary edema 10/05/2011      10/06/2011  
   
                                                      
  
  
Overview:Formatting of this note might be different from the original.  
10/5/2011  
cardiogenic and noncardiogenic factors  
  
   
   
                                Atelectasis     10/05/2011      10/06/2011  
   
                                Hypotension     10/04/2011      10/06/2011  
   
                                                      
  
  
Overview:Formatting of this note might be different from the original.  
10/4/2011  
goal map 65-80  
   
   
                                Stress hyperglycemia 10/04/2011      10/06/2011  
   
                                                      
  
  
Overview:Formatting of this note might be different from the original.  
10/4/2011  
Perioperative insulin resistance and exacerbation of hyperglycemia.  
Control blood glucose with titration of insulin infusion  
  
10/5/2011  
adequate control  
goal FBG<180  
  
   
   
                                Post-op pain    10/04/2011      10/06/2011  
   
                                Mechanically assisted ventilation 10/04/2011      
  10/05/2011  
   
                                                      
  
  
Overview:Formatting of this note might be different from the original.  
10/4/2011  
optimize MV settings, WTE  
current setting:FiO2, 75%, peep 8  
   
   
                                Cardiac insufficiency 10/04/2011      10/05/2011  
   
                                                      
  
  
Overview:Formatting of this note might be different from the original.  
10/4/2011  
RV mild to moderate post pump  
goal CI>2.3  
   
   
                                Hypoxemia       10/04/2011      10/06/2011  
   
                                discharge planning 2011      10/19/2011  
   
                                                      
  
  
Overview:Formatting of this note might be different from the original.  
age 71,  lives in [place], [place]. No DC needs.  
/  
   
   
                                Pre-op testing  2011      10/04/2011  
   
                                                      
  
  
Overview:Formatting of this note is different from the original.  
Images from the original note were not included.  
[facility]  
PRE-OP CHECKLIST  
  
Surgeon: Deangelo Angulo M.D. Informed Consent Completed: No  
STS Score: 1.4%  
CAD: Yes - CAD on Problem List: Yes  
Is intended procedure a CABG: Yes - is a beta blocker ordered? Yes  
  
H & P completed: Yes  
  
PA/LAT: Completed CT: Completed MRI: N/A LE US: N/A  
  
Cath: Yes - reviewed: Yes Echo:Completed EKG: Completed EF %: 61  
  
PI's: N/A Carotid: Completed Mapping: N/A Dental: Completed PFT's: N/A  
  
Basename 11 0729  
WBC 7.18  
HB 13.1  
HCT 41.8  
  
INR 1.0  
CREAT 1.35  
  
UA: Normal  
HCG:N/A  
  
ABO/ABO Confirmed: Yes  
  
Blood ordered: No  
  
SA Swab: Yes - results: Pending  
  
Last Dose of Anticoagulation: vitamins  
  
Op Note: N/A  
  
Pacemaker Check: N/A  
  
Consults: GI 2001  
  
DM: No  
  
Cardiac Surgical prep: No  
  
SIGNATURE: Krystal Castellanos RN CHECKED BY:  
DATE of SERVICE: 2011  
TIME of SERVICE: 2:23 PM  
  
  
   
   
                                Anemia of chronic disease 2011  
   
                                Intestinal polyp 2011  
   
                                                      
  
  
Overview:Formatting of this note might be different from the original.  
Distal ileum ulcerated polyp.  
   
   
                                Nonspecific abnormal results of liver function s  
tudy 2006  
   
                                Impotence of organic origin 2006  
   
                                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  
                 2021  
  
documented as of this encounter (statuses as of 2023)  
[facility]06- History of Past illness Narrative*   
  
                                Problem         Noted Date      Resolved Date  
   
                                Medicare annual wellness visit, subsequent 2019  
   
                                Encounter for long-term (current) use of medicat  
ions 2018  
   
                                                      
  
  
Overview:Formatting of this note might be different from the original.  
Added automatically from request for surgery [id number]  
   
   
                                History of colonic polyps 2018  
   
                                                      
  
  
Overview:Formatting of this note might be different from the original.  
Added automatically from request for surgery [id number]  
   
   
                                Gastroesophageal reflux disease 2018  
   
                                                      
  
  
Overview:Formatting of this note might be different from the original.  
Added automatically from request for surgery [id number]  
   
   
                                Acute pulmonary edema 10/05/2011      10/06/2011  
   
                                                      
  
  
Overview:Formatting of this note might be different from the original.  
10/5/2011  
cardiogenic and noncardiogenic factors  
  
   
   
                                Atelectasis     10/05/2011      10/06/2011  
   
                                Hypotension     10/04/2011      10/06/2011  
   
                                                      
  
  
Overview:Formatting of this note might be different from the original.  
10/4/2011  
goal map 65-80  
   
   
                                Stress hyperglycemia 10/04/2011      10/06/2011  
   
                                                      
  
  
Overview:Formatting of this note might be different from the original.  
10/4/2011  
Perioperative insulin resistance and exacerbation of hyperglycemia.  
Control blood glucose with titration of insulin infusion  
  
10/5/2011  
adequate control  
goal FBG<180  
  
   
   
                                Post-op pain    10/04/2011      10/06/2011  
   
                                Mechanically assisted ventilation 10/04/2011      
  10/05/2011  
   
                                                      
  
  
Overview:Formatting of this note might be different from the original.  
10/4/2011  
optimize MV settings, WTE  
current setting:FiO2, 75%, peep 8  
   
   
                                Cardiac insufficiency 10/04/2011      10/05/2011  
   
                                                      
  
  
Overview:Formatting of this note might be different from the original.  
10/4/2011  
RV mild to moderate post pump  
goal CI>2.3  
   
   
                                Hypoxemia       10/04/2011      10/06/2011  
   
                                discharge planning 2011      10/19/2011  
   
                                                      
  
  
Overview:Formatting of this note might be different from the original.  
age 71,  lives in [place], [place]. No DC needs.  
/  
   
   
                                Pre-op testing  2011      10/04/2011  
   
                                                      
  
  
Overview:Formatting of this note is different from the original.  
Images from the original note were not included.  
[facility]  
PRE-OP CHECKLIST  
  
Surgeon: Deangelo Angulo M.D. Informed Consent Completed: No  
STS Score: 1.4%  
CAD: Yes - CAD on Problem List: Yes  
Is intended procedure a CABG: Yes - is a beta blocker ordered? Yes  
  
H & P completed: Yes  
  
PA/LAT: Completed CT: Completed MRI: N/A LE US: N/A  
  
Cath: Yes - reviewed: Yes Echo:Completed EKG: Completed EF %: 61  
  
PI's: N/A Carotid: Completed Mapping: N/A Dental: Completed PFT's: N/A  
  
Basename 11 0729  
WBC 7.18  
HB 13.1  
HCT 41.8  
  
INR 1.0  
CREAT 1.35  
  
UA: Normal  
HCG:N/A  
  
ABO/ABO Confirmed: Yes  
  
Blood ordered: No  
  
SA Swab: Yes - results: Pending  
  
Last Dose of Anticoagulation: vitamins  
  
Op Note: N/A  
  
Pacemaker Check: N/A  
  
Consults: GI 2001  
  
DM: No  
  
Cardiac Surgical prep: No  
  
SIGNATURE: Krystal Castellanos RN CHECKED BY:  
DATE of SERVICE: 2011  
TIME of SERVICE: 2:23 PM  
  
  
   
   
                                Anemia of chronic disease 2011  
   
                                Intestinal polyp 2011  
   
                                                      
  
  
Overview:Formatting of this note might be different from the original.  
Distal ileum ulcerated polyp.  
   
   
                                Nonspecific abnormal results of liver function s  
tudy 2006  
   
                                Impotence of organic origin 2006  
   
                                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  
                 2021  
  
documented as of this encounter (statuses as of 2023)  
[facility]06- History of Past illness Narrative*   
  
                                Problem         Noted Date      Resolved Date  
   
                                Medicare annual wellness visit, subsequent 2019  
   
                                Encounter for long-term (current) use of medicat  
ions 2018  
   
                                                      
  
  
Overview:Formatting of this note might be different from the original.  
Added automatically from request for surgery [id number]  
   
   
                                History of colonic polyps 2018  
   
                                                      
  
  
Overview:Formatting of this note might be different from the original.  
Added automatically from request for surgery [id number]  
   
   
                                Gastroesophageal reflux disease 2018  
   
                                                      
  
  
Overview:Formatting of this note might be different from the original.  
Added automatically from request for surgery [id number]  
   
   
                                Acute pulmonary edema 10/05/2011      10/06/2011  
   
                                                      
  
  
Overview:Formatting of this note might be different from the original.  
10/5/2011  
cardiogenic and noncardiogenic factors  
  
   
   
                                Atelectasis     10/05/2011      10/06/2011  
   
                                Hypotension     10/04/2011      10/06/2011  
   
                                                      
  
  
Overview:Formatting of this note might be different from the original.  
10/4/2011  
goal map 65-80  
   
   
                                Stress hyperglycemia 10/04/2011      10/06/2011  
   
                                                      
  
  
Overview:Formatting of this note might be different from the original.  
10/4/2011  
Perioperative insulin resistance and exacerbation of hyperglycemia.  
Control blood glucose with titration of insulin infusion  
  
10/5/2011  
adequate control  
goal FBG<180  
  
   
   
                                Post-op pain    10/04/2011      10/06/2011  
   
                                Mechanically assisted ventilation 10/04/2011      
  10/05/2011  
   
                                                      
  
  
Overview:Formatting of this note might be different from the original.  
10/4/2011  
optimize MV settings, WTE  
current setting:FiO2, 75%, peep 8  
   
   
                                Cardiac insufficiency 10/04/2011      10/05/2011  
   
                                                      
  
  
Overview:Formatting of this note might be different from the original.  
10/4/2011  
RV mild to moderate post pump  
goal CI>2.3  
   
   
                                Hypoxemia       10/04/2011      10/06/2011  
   
                                discharge planning 2011      10/19/2011  
   
                                                      
  
  
Overview:Formatting of this note might be different from the original.  
age 71,  lives in [place], [place]. No DC needs.  
/  
   
   
                                Pre-op testing  2011      10/04/2011  
   
                                                      
  
  
Overview:Formatting of this note is different from the original.  
Images from the original note were not included.  
[facility]  
PRE-OP CHECKLIST  
  
Surgeon: Deangelo Angulo M.D. Informed Consent Completed: No  
STS Score: 1.4%  
CAD: Yes - CAD on Problem List: Yes  
Is intended procedure a CABG: Yes - is a beta blocker ordered? Yes  
  
H & P completed: Yes  
  
PA/LAT: Completed CT: Completed MRI: N/A LE US: N/A  
  
Cath: Yes - reviewed: Yes Echo:Completed EKG: Completed EF %: 61  
  
PI's: N/A Carotid: Completed Mapping: N/A Dental: Completed PFT's: N/A  
  
Basename 11 0729  
WBC 7.18  
HB 13.1  
HCT 41.8  
  
INR 1.0  
CREAT 1.35  
  
UA: Normal  
HCG:N/A  
  
ABO/ABO Confirmed: Yes  
  
Blood ordered: No  
  
SA Swab: Yes - results: Pending  
  
Last Dose of Anticoagulation: vitamins  
  
Op Note: N/A  
  
Pacemaker Check: N/A  
  
Consults: GI 2001  
  
DM: No  
  
Cardiac Surgical prep: No  
  
SIGNATURE: Krystal Castellanos RN CHECKED BY:  
DATE of SERVICE: 2011  
TIME of SERVICE: 2:23 PM  
  
  
   
   
                                Anemia of chronic disease 2011  
   
                                Intestinal polyp 2011  
   
                                                      
  
  
Overview:Formatting of this note might be different from the original.  
Distal ileum ulcerated polyp.  
   
   
                                Nonspecific abnormal results of liver function s  
tudy 2006  
   
                                Impotence of organic origin 2006  
   
                                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  
                 2021  
  
documented as of this encounter (statuses as of 2023)  
[facility]06- History of Past illness Narrative*   
  
                                Problem         Noted Date      Resolved Date  
   
                                Medicare annual wellness visit, subsequent 2019  
   
                                Encounter for long-term (current) use of medicat  
ions 2018  
   
                                                      
  
  
Overview:Formatting of this note might be different from the original.  
Added automatically from request for surgery [id number]  
   
   
                                History of colonic polyps 2018  
   
                                                      
  
  
Overview:Formatting of this note might be different from the original.  
Added automatically from request for surgery [id number]  
   
   
                                Gastroesophageal reflux disease 2018  
   
                                                      
  
  
Overview:Formatting of this note might be different from the original.  
Added automatically from request for surgery [id number]  
   
   
                                Acute pulmonary edema 10/05/2011      10/06/2011  
   
                                                      
  
  
Overview:Formatting of this note might be different from the original.  
10/5/2011  
cardiogenic and noncardiogenic factors  
  
   
   
                                Atelectasis     10/05/2011      10/06/2011  
   
                                Hypotension     10/04/2011      10/06/2011  
   
                                                      
  
  
Overview:Formatting of this note might be different from the original.  
10/4/2011  
goal map 65-80  
   
   
                                Stress hyperglycemia 10/04/2011      10/06/2011  
   
                                                      
  
  
Overview:Formatting of this note might be different from the original.  
10/4/2011  
Perioperative insulin resistance and exacerbation of hyperglycemia.  
Control blood glucose with titration of insulin infusion  
  
10/5/2011  
adequate control  
goal FBG<180  
  
   
   
                                Post-op pain    10/04/2011      10/06/2011  
   
                                Mechanically assisted ventilation 10/04/2011      
  10/05/2011  
   
                                                      
  
  
Overview:Formatting of this note might be different from the original.  
10/4/2011  
optimize MV settings, WTE  
current setting:FiO2, 75%, peep 8  
   
   
                                Cardiac insufficiency 10/04/2011      10/05/2011  
   
                                                      
  
  
Overview:Formatting of this note might be different from the original.  
10/4/2011  
RV mild to moderate post pump  
goal CI>2.3  
   
   
                                Hypoxemia       10/04/2011      10/06/2011  
   
                                discharge planning 2011      10/19/2011  
   
                                                      
  
  
Overview:Formatting of this note might be different from the original.  
age 71,  lives in [place], [place]. No DC needs.  
/  
   
   
                                Pre-op testing  2011      10/04/2011  
   
                                                      
  
  
Overview:Formatting of this note is different from the original.  
Images from the original note were not included.  
[facility]  
PRE-OP CHECKLIST  
  
Surgeon: Deangelo Angulo M.D. Informed Consent Completed: No  
STS Score: 1.4%  
CAD: Yes - CAD on Problem List: Yes  
Is intended procedure a CABG: Yes - is a beta blocker ordered? Yes  
  
H & P completed: Yes  
  
PA/LAT: Completed CT: Completed MRI: N/A LE US: N/A  
  
Cath: Yes - reviewed: Yes Echo:Completed EKG: Completed EF %: 61  
  
PI's: N/A Carotid: Completed Mapping: N/A Dental: Completed PFT's: N/A  
  
Basename 11 0729  
WBC 7.18  
HB 13.1  
HCT 41.8  
  
INR 1.0  
CREAT 1.35  
  
UA: Normal  
HCG:N/A  
  
ABO/ABO Confirmed: Yes  
  
Blood ordered: No  
  
SA Swab: Yes - results: Pending  
  
Last Dose of Anticoagulation: vitamins  
  
Op Note: N/A  
  
Pacemaker Check: N/A  
  
Consults: GI 2001  
  
DM: No  
  
Cardiac Surgical prep: No  
  
SIGNATURE: Krystal Castellanos RN CHECKED BY:  
DATE of SERVICE: 2011  
TIME of SERVICE: 2:23 PM  
  
  
   
   
                                Anemia of chronic disease 2011  
   
                                Intestinal polyp 2011  
   
                                                      
  
  
Overview:Formatting of this note might be different from the original.  
Distal ileum ulcerated polyp.  
   
   
                                Nonspecific abnormal results of liver function s  
tudy 2006  
   
                                Impotence of organic origin 2006  
   
                                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  
                 2021  
  
documented as of this encounter (statuses as of 2023)  
[facility]06- History of Past illness Narrative*   
  
                          Problem      Noted Date   Diagnosed Date Resolved Date  
   
                          Medicare annual wellness visit, subsequent 2019  
   
                                                    Encounter for long-term (cur  
rent) use of   
medications         2018  
   
                                                      
  
  
Overview:Formatting of this note might be different from the original.  
Added automatically from request for surgery [id number]  
   
   
                          History of colonic polyps 2018  
   
                                                      
  
  
Overview:Formatting of this note might be different from the original.  
Added automatically from request for surgery [id number]  
   
   
                          Gastroesophageal reflux disease 2018  
   
                                                      
  
  
Overview:Formatting of this note might be different from the original.  
Added automatically from request for surgery [id number]  
   
   
                          Acute pulmonary edema 10/05/2011                10/06/  
2011  
   
                                                      
  
  
Overview:Formatting of this note might be different from the original.  
10/5/2011  
cardiogenic and noncardiogenic factors  
  
   
   
                          Atelectasis  10/05/2011                10/06/2011  
   
                          Hypotension  10/04/2011                10/06/2011  
   
                                                      
  
  
Overview:Formatting of this note might be different from the original.  
10/4/2011  
goal map 65-80  
   
   
                          Stress hyperglycemia 10/04/2011                10/06/2  
011  
   
                                                      
  
  
Overview:Formatting of this note might be different from the original.  
10/4/2011  
Perioperative insulin resistance and exacerbation of hyperglycemia.  
Control blood glucose with titration of insulin infusion  
  
10/5/2011  
adequate control  
goal FBG<180  
  
   
   
                          Post-op pain 10/04/2011                10/06/2011  
   
                          Mechanically assisted ventilation 10/04/2011            
      10/05/2011  
   
                                                      
  
  
Overview:Formatting of this note might be different from the original.  
10/4/2011  
optimize MV settings, WTE  
current setting:FiO2, 75%, peep 8  
   
   
                          Cardiac insufficiency 10/04/2011                10/05/  
2011  
   
                                                      
  
  
Overview:Formatting of this note might be different from the original.  
10/4/2011  
RV mild to moderate post pump  
goal CI>2.3  
   
   
                          Hypoxemia    10/04/2011                10/06/2011  
   
                          discharge planning 2011                10/19/201  
1  
   
                                                      
  
  
Overview:Formatting of this note might be different from the original.  
age 71,  lives in [place], [place]. No DC needs.  
/  
   
   
                          Pre-op testing 2011                10/04/2011  
   
                                                      
  
  
Overview:Formatting of this note is different from the original.  
Images from the original note were not included.  
[facility]  
PRE-OP CHECKLIST  
  
Surgeon: Deangelo Angulo M.D. Informed Consent Completed: No  
STS Score: 1.4%  
CAD: Yes - CAD on Problem List: Yes  
Is intended procedure a CABG: Yes - is a beta blocker ordered? Yes  
  
H & P completed: Yes  
  
PA/LAT: Completed CT: Completed MRI: N/A LE US: N/A  
  
Cath: Yes - reviewed: Yes Echo:Completed EKG: Completed EF %: 61  
  
PI's: N/A Carotid: Completed Mapping: N/A Dental: Completed PFT's: N/A  
  
Basename 11 0729  
WBC 7.18  
HB 13.1  
HCT 41.8  
  
INR 1.0  
CREAT 1.35  
  
UA: Normal  
HCG:N/A  
  
ABO/ABO Confirmed: Yes  
  
Blood ordered: No  
  
SA Swab: Yes - results: Pending  
  
Last Dose of Anticoagulation: vitamins  
  
Op Note: N/A  
  
Pacemaker Check: N/A  
  
Consults: GI 2001  
  
DM: No  
  
Cardiac Surgical prep: No  
  
SIGNATURE: Krystal Castellanos RN CHECKED BY:  
DATE of SERVICE: 2011  
TIME of SERVICE: 2:23 PM  
  
  
   
   
                          Anemia of chronic disease 2011  
   
                          Intestinal polyp 2011  
   
                                                      
  
  
Overview:Formatting of this note might be different from the original.  
Distal ileum ulcerated polyp.  
   
   
                                                    Nonspecific abnormal results  
 of liver   
function study      2006  
   
                          Impotence of organic origin 2006  
   
                                                    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  
9.9 (morbid   
obesity)                                                    2021  
  
documented as of this encounter (statuses as of 2023)  
[facility]06- History of Past illness Narrative*   
  
                          Problem      Noted Date   Diagnosed Date Resolved Date  
   
                          Medicare annual wellness visit, subsequent 2019  
   
                                                    Encounter for long-term (cur  
rent) use of   
medications         2018  
   
                                                      
  
  
Overview:Formatting of this note might be different from the original.  
Added automatically from request for surgery [id number]  
   
   
                          History of colonic polyps 2018  
   
                                                      
  
  
Overview:Formatting of this note might be different from the original.  
Added automatically from request for surgery [id number]  
   
   
                          Gastroesophageal reflux disease 2018  
   
                                                      
  
  
Overview:Formatting of this note might be different from the original.  
Added automatically from request for surgery [id number]  
   
   
                          Acute pulmonary edema 10/05/2011                10/06/  
2011  
   
                                                      
  
  
Overview:Formatting of this note might be different from the original.  
10/5/2011  
cardiogenic and noncardiogenic factors  
  
   
   
                          Atelectasis  10/05/2011                10/06/2011  
   
                          Hypotension  10/04/2011                10/06/2011  
   
                                                      
  
  
Overview:Formatting of this note might be different from the original.  
10/4/2011  
goal map 65-80  
   
   
                          Stress hyperglycemia 10/04/2011                10/06/2  
011  
   
                                                      
  
  
Overview:Formatting of this note might be different from the original.  
10/4/2011  
Perioperative insulin resistance and exacerbation of hyperglycemia.  
Control blood glucose with titration of insulin infusion  
  
10/5/2011  
adequate control  
goal FBG<180  
  
   
   
                          Post-op pain 10/04/2011                10/06/2011  
   
                          Mechanically assisted ventilation 10/04/2011            
      10/05/2011  
   
                                                      
  
  
Overview:Formatting of this note might be different from the original.  
10/4/2011  
optimize MV settings, WTE  
current setting:FiO2, 75%, peep 8  
   
   
                          Cardiac insufficiency 10/04/2011                10/05/  
2011  
   
                                                      
  
  
Overview:Formatting of this note might be different from the original.  
10/4/2011  
RV mild to moderate post pump  
goal CI>2.3  
   
   
                          Hypoxemia    10/04/2011                10/06/2011  
   
                          discharge planning 2011                10/19/201  
1  
   
                                                      
  
  
Overview:Formatting of this note might be different from the original.  
age 71,  lives in [place], [place]. No DC needs.  
/  
   
   
                          Pre-op testing 2011                10/04/2011  
   
                                                      
  
  
Overview:Formatting of this note is different from the original.  
Images from the original note were not included.  
[facility]  
PRE-OP CHECKLIST  
  
Surgeon: Deangelo Angulo M.D. Informed Consent Completed: No  
STS Score: 1.4%  
CAD: Yes - CAD on Problem List: Yes  
Is intended procedure a CABG: Yes - is a beta blocker ordered? Yes  
  
H & P completed: Yes  
  
PA/LAT: Completed CT: Completed MRI: N/A LE US: N/A  
  
Cath: Yes - reviewed: Yes Echo:Completed EKG: Completed EF %: 61  
  
PI's: N/A Carotid: Completed Mapping: N/A Dental: Completed PFT's: N/A  
  
Basename 11 0729  
WBC 7.18  
HB 13.1  
HCT 41.8  
  
INR 1.0  
CREAT 1.35  
  
UA: Normal  
HCG:N/A  
  
ABO/ABO Confirmed: Yes  
  
Blood ordered: No  
  
SA Swab: Yes - results: Pending  
  
Last Dose of Anticoagulation: vitamins  
  
Op Note: N/A  
  
Pacemaker Check: N/A  
  
Consults: GI 2001  
  
DM: No  
  
Cardiac Surgical prep: No  
  
SIGNATURE: Krystal Castellanos RN CHECKED BY:  
DATE of SERVICE: 2011  
TIME of SERVICE: 2:23 PM  
  
  
   
   
                          Anemia of chronic disease 2011  
   
                          Intestinal polyp 2011  
   
                                                      
  
  
Overview:Formatting of this note might be different from the original.  
Distal ileum ulcerated polyp.  
   
   
                                                    Nonspecific abnormal results  
 of liver   
function study      2006  
   
                          Impotence of organic origin 2006  
   
                                                    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  
9.9 (morbid   
obesity)                                                    2021  
  
documented as of this encounter (statuses as of 2023)  
[facility]06- History of Past illness Narrative*   
  
                          Problem      Noted Date   Diagnosed Date Resolved Date  
   
                          Medicare annual wellness visit, subsequent 2019  
   
                                                    Encounter for long-term (cur  
rent) use of   
medications         2018  
   
                                                      
  
  
Overview:Formatting of this note might be different from the original.  
Added automatically from request for surgery [id number]  
   
   
                          History of colonic polyps 2018  
   
                                                      
  
  
Overview:Formatting of this note might be different from the original.  
Added automatically from request for surgery [id number]  
   
   
                          Gastroesophageal reflux disease 2018  
   
                                                      
  
  
Overview:Formatting of this note might be different from the original.  
Added automatically from request for surgery [id number]  
   
   
                          Acute pulmonary edema 10/05/2011                10/06/  
2011  
   
                                                      
  
  
Overview:Formatting of this note might be different from the original.  
10/5/2011  
cardiogenic and noncardiogenic factors  
  
   
   
                          Atelectasis  10/05/2011                10/06/2011  
   
                          Hypotension  10/04/2011                10/06/2011  
   
                                                      
  
  
Overview:Formatting of this note might be different from the original.  
10/4/2011  
goal map 65-80  
   
   
                          Stress hyperglycemia 10/04/2011                10/06/2  
011  
   
                                                      
  
  
Overview:Formatting of this note might be different from the original.  
10/4/2011  
Perioperative insulin resistance and exacerbation of hyperglycemia.  
Control blood glucose with titration of insulin infusion  
  
10/5/2011  
adequate control  
goal FBG<180  
  
   
   
                          Post-op pain 10/04/2011                10/06/2011  
   
                          Mechanically assisted ventilation 10/04/2011            
      10/05/2011  
   
                                                      
  
  
Overview:Formatting of this note might be different from the original.  
10/4/2011  
optimize MV settings, WTE  
current setting:FiO2, 75%, peep 8  
   
   
                          Cardiac insufficiency 10/04/2011                10/05/  
2011  
   
                                                      
  
  
Overview:Formatting of this note might be different from the original.  
10/4/2011  
RV mild to moderate post pump  
goal CI>2.3  
   
   
                          Hypoxemia    10/04/2011                10/06/2011  
   
                          discharge planning 2011                10/19/201  
1  
   
                                                      
  
  
Overview:Formatting of this note might be different from the original.  
age 71,  lives in [place], [place]. No DC needs.  
/  
   
   
                          Pre-op testing 2011                10/04/2011  
   
                                                      
  
  
Overview:Formatting of this note is different from the original.  
Images from the original note were not included.  
[facility]  
PRE-OP CHECKLIST  
  
Surgeon: Deangelo Angulo M.D. Informed Consent Completed: No  
STS Score: 1.4%  
CAD: Yes - CAD on Problem List: Yes  
Is intended procedure a CABG: Yes - is a beta blocker ordered? Yes  
  
H & P completed: Yes  
  
PA/LAT: Completed CT: Completed MRI: N/A LE US: N/A  
  
Cath: Yes - reviewed: Yes Echo:Completed EKG: Completed EF %: 61  
  
PI's: N/A Carotid: Completed Mapping: N/A Dental: Completed PFT's: N/A  
  
Basename 11 0729  
WBC 7.18  
HB 13.1  
HCT 41.8  
  
INR 1.0  
CREAT 1.35  
  
UA: Normal  
HCG:N/A  
  
ABO/ABO Confirmed: Yes  
  
Blood ordered: No  
  
SA Swab: Yes - results: Pending  
  
Last Dose of Anticoagulation: vitamins  
  
Op Note: N/A  
  
Pacemaker Check: N/A  
  
Consults: GI 2001  
  
DM: No  
  
Cardiac Surgical prep: No  
  
SIGNATURE: Krystal Castellanos RN CHECKED BY:  
DATE of SERVICE: 2011  
TIME of SERVICE: 2:23 PM  
  
  
   
   
                          Anemia of chronic disease 2011  
   
                          Intestinal polyp 2011  
   
                                                      
  
  
Overview:Formatting of this note might be different from the original.  
Distal ileum ulcerated polyp.  
   
   
                                                    Nonspecific abnormal results  
 of liver   
function study      2006  
   
                          Impotence of organic origin 2006  
   
                                                    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  
9.9 (morbid   
obesity)                                                    2021  
  
documented as of this encounter (statuses as of 2023)  
[facility]   
  
                                                    Diagnosis  
   
                                                      
  
  
Essential hypertension- Primary  
  
  
Unspecified essential hypertension  
  
documented in this encounter  
[facility]   
  
                                                    Diagnosis  
   
                                                      
  
  
Gastroesophageal reflux disease, unspecified whether esophagitis present  
   
                                                      
  
  
Essential hypertension  
  
  
Unspecified essential hypertension  
   
                                                      
  
  
Mixed hyperlipidemia  
   
                                                      
  
  
Aortic valve disorder  
  
  
Aortic valve disorders  
   
                                                      
  
  
Coronary artery disease involving native coronary artery of native heart without
   
angina pectoris  
  
documented in this encounter  
[facility]   
  
                                                    Diagnosis  
   
                                                      
  
  
Adenomatous polyp of colon, unspecified part of colon- Primary  
   
                                                      
  
  
Impaired fasting glucose  
   
                                                      
  
  
Stage 3 chronic kidney disease, unspecified whether stage 3a or 3b CKD (HCC)  
   
                                                      
  
  
Essential hypertension  
  
  
Unspecified essential hypertension  
   
                                                      
  
  
Anemia of chronic disease  
  
  
Anemia of other chronic disease  
   
                                                      
  
  
Thrombocytopenia (HCC)  
  
  
Thrombocytopenia, unspecified  
  
documented in this encounter  
[facility]   
  
                                                    Diagnosis  
   
                                                      
  
  
Peripheral polyneuropathy- Primary  
  
  
Unspecified hereditary and idiopathic peripheral neuropathy  
   
                                                      
  
  
Frequent falls  
  
  
Personal history of fall  
   
                                                      
  
  
Contusion of hip, unspecified laterality, subsequent encounter  
   
                                                      
  
  
Muscular weakness  
  
  
Muscle weakness (generalized)  
   
                                                      
  
  
Abnormality of gait  
  
documented in this encounter  
[facility]   
  
                                                    Diagnosis  
   
                                                      
  
  
Frequent falls- Primary  
  
  
Personal history of fall  
   
                                                      
  
  
Peripheral polyneuropathy  
  
  
Unspecified hereditary and idiopathic peripheral neuropathy  
  
documented in this encounter  
[facility]   
  
                                                    Diagnosis  
   
                                                      
  
  
Peripheral polyneuropathy- Primary  
  
  
Unspecified hereditary and idiopathic peripheral neuropathy  
   
                                                      
  
  
Abnormality of gait  
   
                                                      
  
  
Stage 3a chronic kidney disease (HCC)  
   
                                                      
  
  
Essential hypertension  
  
  
Unspecified essential hypertension  
  
documented in this encounter  
[facility]   
  
                                                    Diagnosis  
   
                                                      
  
  
Personal history of falling, presenting hazards to health- Primary  
  
  
Personal history of fall  
   
                                                      
  
  
Peripheral polyneuropathy  
  
  
Unspecified hereditary and idiopathic peripheral neuropathy  
   
                                                      
  
  
Abnormality of gait  
  
documented in this encounter  
[facility]   
  
                                                    Diagnosis  
   
                                                      
  
  
Monoclonal gammopathy- Primary  
  
  
Monoclonal paraproteinemia  
   
                                                      
  
  
Macrocytic anemia  
  
  
Unspecified deficiency anemia  
   
                                                      
  
  
Thrombocytopenia (HCC)  
  
  
Thrombocytopenia, unspecified  
  
documented in this encounter  
[facility]   
  
                                                    Diagnosis  
   
                                                      
  
  
Cirrhosis of liver without ascites, unspecified hepatic cirrhosis type (HCC)- 
Primary  
   
                                                      
  
  
Splenomegaly  
   
                                                      
  
  
Liver lesion  
  
  
Other specified disorders of liver  
   
                                                      
  
  
Metabolic syndrome  
  
  
Dysmetabolic Syndrome X  
   
                                                      
  
  
Lower extremity edema  
  
  
Edema  
   
                                                      
  
  
Healthcare maintenance  
  
  
Routine general medical examination at a health care facility  
  
documented in this encounter  
[facility]   
  
                                                    Diagnosis  
   
                                                      
  
  
Cirrhosis of liver without ascites, unspecified hepatic cirrhosis type (HCC)- 
Primary  
   
                                                      
  
  
Claustrophobia  
  
  
Other isolated or specific phobias  
  
documented in this encounter  
[facility]   
  
                                                    Diagnosis  
   
                                                      
  
  
Stasis dermatitis of both legs- Primary  
  
  
Varicose veins of lower extremities with inflammation  
   
                                                      
  
  
Local skin infection  
  
  
Unspecified local infection of skin and subcutaneous tissue  
   
                                                      
  
  
Urge incontinence of urine  
  
  
Urge incontinence  
   
                                                      
  
  
Peripheral polyneuropathy  
  
  
Unspecified hereditary and idiopathic peripheral neuropathy  
  
documented in this encounter  
[facility]   
  
                                                    Diagnosis  
   
                                                      
  
  
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  
  
  
Iron deficiency anemia secondary to blood loss (chronic)  
   
                                                      
  
  
Iron malabsorption  
  
  
Other specified intestinal malabsorption  
  
documented in this encounter  
[facility]   
  
                                                    Diagnosis  
   
                                                      
  
  
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  
  
  
Iron deficiency anemia secondary to blood loss (chronic)  
   
                                                      
  
  
Iron malabsorption  
  
  
Other specified intestinal malabsorption  
  
documented in this encounter  
[facility]   
  
                                                    Diagnosis  
   
                                                      
  
  
Iron deficiency anemia, unspecified iron deficiency anemia type- Primary  
   
                                                      
  
  
History of colonic polyps  
  
  
Personal history of colonic polyps  
   
                                                      
  
  
History of cirrhosis  
  
  
Personal history of other diseases of digestive system  
  
documented in this encounter  
[facility]   
  
                                                    Diagnosis  
   
                                                      
  
  
Cirrhosis of liver without ascites, unspecified hepatic cirrhosis type (HCC)- 
Primary  
   
                                                      
  
  
Liver mass  
  
  
Unspecified disorder of liver  
   
                                                      
  
  
Splenomegaly  
  
documented in this encounter  
[facility]   
  
                                                    Diagn[facility]  
   
                                                      
  
  
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  
  
  
Iron deficiency anemia secondary to blood loss (chronic)  
   
                                                      
  
  
Iron malabsorption  
  
  
Other specified intestinal malabsorption  
  
documented in this encounter  
[facility]   
  
                                                    Diagnosis  
   
                                                      
  
  
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  
  
  
Iron deficiency anemia secondary to blood loss (chronic)  
   
                                                      
  
  
Iron malabsorption  
  
  
Other specified intestinal malabsorption  
  
documented in this encounter  
[facility]   
  
                                                    Diagnosis  
   
                                                      
  
  
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  
  
  
Iron deficiency anemia secondary to blood loss (chronic)  
   
                                                      
  
  
Iron malabsorption  
  
  
Other specified intestinal malabsorption  
  
documented in this encounter  
[facility]   
  
                                                    Diagnosis  
   
                                                      
  
  
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  
  
  
Iron deficiency anemia secondary to blood loss (chronic)  
   
                                                      
  
  
Iron malabsorption  
  
  
Other specified intestinal malabsorption  
  
documented in this encounter  
[facility]   
  
                                                    Diagnosis  
   
                                                      
  
  
IgG monoclonal gammopathy- Primary  
  
  
Monoclonal paraproteinemia  
   
                                                      
  
  
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  
  
  
Iron deficiency anemia secondary to blood loss (chronic)  
   
                                                      
  
  
Iron malabsorption  
  
  
Other specified intestinal malabsorption  
   
                                                      
  
  
Liver mass  
  
  
Unspecified disorder of liver  
   
                                                      
  
  
Splenomegaly  
   
                                                      
  
  
Hypercalcemia  
  
documented in this encounter  
[facility]   
  
                                                    Diagnosis  
   
                                                      
  
  
MGUS (monoclonal gammopathy of unknown significance)- Primary  
  
  
Monoclonal paraproteinemia  
   
                                                      
  
  
IgG monoclonal gammopathy  
  
  
Monoclonal paraproteinemia  
   
                                                      
  
  
Thrombocytopenia (HCC)  
  
  
Thrombocytopenia, unspecified  
   
                                                      
  
  
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  
  
  
Iron deficiency anemia secondary to blood loss (chronic)  
   
                                                      
  
  
Hyperparathyroidism (HCC)  
  
  
Hyperparathyroidism, unspecified  
   
                                                      
  
  
Other cirrhosis of liver (HCC)  
  
documented in this encounter  
[facility]   
  
                                                    Diagnosis  
   
                                                      
  
  
Urge incontinence of urine  
  
  
Urge incontinence  
  
documented in this encounter  
[facility]   
  
                                                    Diagnosis  
   
                                                      
  
  
Venous stasis ulcer of right calf limited to breakdown of skin without varicose 
veins   
(HCC)- Primary  
   
                                                      
  
  
Cellulitis of right lower leg  
  
  
Cellulitis and abscess of leg, except foot  
  
[facility] this encounter  
[facility]   
  
                                                    Diagnosis  
   
                                                      
  
  
Venous stasis ulcer of other part of right lower leg limited to breakdown of 
skin   
without varicose veins (HCC)- Primary  
   
                                                      
  
  
Rash  
  
  
Rash and other nonspecific skin eruption  
   
                                                      
  
  
Partial thickness burn of left hand, unspecified site of hand, initial encounter  
   
                                                      
  
  
Cirrhosis of liver without ascites, unspecified hepatic cirrhosis type (HCC)  
  
documented in this encounter  
[facility]   
  
                                                    Diagnosis  
   
                                                      
  
  
Cirrhosis of liver without ascites, unspecified hepatic cirrhosis type (HCC)- 
Primary  
   
                                                      
  
  
Hepatocellular carcinoma (HCC)  
  
  
Malignant neoplasm of liver, primary  
  
documented in this encounter  
[facility]   
  
                                                    Diagnosis  
   
                                                      
  
  
Cirrhosis of liver without ascites, unspecified hepatic cirrhosis type (HCC)  
   
                                                      
  
  
Liver mass  
  
  
Unspecified disorder of liver  
   
                                                      
  
  
Splenomegaly  
  
documented in this encounter  
[facility]   
  
                                                    Diagnosis  
   
                                                      
  
  
Hepatocellular carcinoma (HCC)- Primary  
  
  
Malignant neoplasm of liver, primary  
  
[facility] in this encounter  
[facility]   
  
                                                    Diagnosis  
   
                                                      
  
  
Hepatocellular carcinoma (HCC)- Primary  
  
  
Malignant neoplasm of liver, primary  
   
                                                      
  
  
Cirrhosis of liver without ascites, unspecified hepatic cirrhosis type (HCC)  
  
documented in this encounter  
[facility]   
  
                                                    Diagnosis  
   
                                                      
  
  
Cirrhosis of liver without ascites, unspecified hepatic cirrhosis type (HCC)- 
Primary  
  
documented in this encounter  
[facility]   
  
                                                    Diagnosis  
   
                                                      
  
  
Hepatocellular carcinoma (HCC)- Primary  
  
  
Malignant neoplasm of liver, primary  
  
documented in this encounter  
[facility]   
  
                                                    Diagnosis  
   
                                                      
  
  
Hepatocellular carcinoma (HCC)- Primary  
  
  
Malignant neoplasm of liver, primary  
  
documented in this encounter  
[facility]   
  
                                                    Diagnosis  
   
                                                      
  
  
Unspecified essential hypertension  
   
                                                      
  
  
Urge incontinence of urine  
  
  
Urge incontinence  
  
documented in this encounter  
[facility]   
  
                                                    Diagnosis  
   
                                                      
  
  
Coronary artery disease involving native coronary artery of native heart without
   
angina pectoris  
   
                                                      
  
  
Urge incontinence of urine  
  
  
Urge incontinence  
  
documented in this encounter  
[facility]   
  
                                                    Diagnosis  
   
                                                      
  
  
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  
  
  
Iron deficiency anemia secondary to blood loss (chronic)  
   
                                                      
  
  
Macrocytic anemia  
  
  
Unspecified deficiency anemia  
   
                                                      
  
  
Hepatocellular carcinoma (HCC)  
  
  
Malignant neoplasm of liver, primary  
   
                                                      
  
  
Thrombocytopenia (HCC)  
  
  
Thrombocytopenia, unspecified  
   
                                                      
  
  
Hyperparathyroidism (HCC)  
  
  
Hyperparathyroidism, unspecified  
   
                                                      
  
  
MGUS (monoclonal gammopathy of unknown significance)  
  
  
Monoclonal paraproteinemia  
  
documented in this encounter  
[facility]   
  
                                                    Diagnosis  
   
                                                      
  
  
New onset atrial flutter (HCC)- Primary  
  
  
Atrial flutter  
   
                                                      
  
  
Coronary artery disease involving native coronary artery of native heart without
   
angina pectoris  
  
documented in this encounter  
[facility]   
  
                                                    Diagnosis  
   
                                                      
  
  
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  
  
  
Iron deficiency anemia secondary to blood loss (chronic)  
   
                                                      
  
  
Iron malabsorption  
  
  
Other specified intestinal malabsorption  
  
documented in this encounter  
[facility]   
  
                                                    Diagnosis  
   
                                                      
  
  
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  
  
  
Iron deficiency anemia secondary to blood loss (chronic)  
   
                                                      
  
  
Iron malabsorption  
  
  
Other specified intestinal malabsorption  
  
documented in this encounter  
[facility]   
  
                                                    Diagnosis  
   
                                                      
  
  
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  
  
  
Iron deficiency anemia secondary to blood loss (chronic)  
   
                                                      
  
  
Iron malabsorption  
  
  
Other specified intestinal malabsorption  
  
documented in this encounter  
[facility]   
  
                                                    Diagnosis  
   
                                                      
  
  
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  
  
  
Iron deficiency anemia secondary to blood loss (chronic)  
   
                                                      
  
  
Iron malabsorption  
  
  
Other specified intestinal malabsorption  
  
documented in this encounter  
[facility] note*   
  
                                                    Diagnosis  
   
                                                      
  
  
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  
  
  
Iron deficiency anemia secondary to blood loss (chronic)  
   
                                                      
  
  
Iron malabsorption  
  
  
Other specified intestinal malabsorption  
  
documented in this encounter  
[facility]   
  
                                                    Diagnosis  
   
                                                      
  
  
Melena- Primary  
  
  
Blood in stool  
  
documented in this encounter  
[facility] note*   
  
                                                    Diagnosis  
   
                                                      
  
  
Monoclonal gammopathy  
  
  
Monoclonal paraproteinemia  
   
                                                      
  
  
Macrocytic anemia  
  
  
Unspecified deficiency anemia  
   
                                                      
  
  
Thrombocytopenia (HCC)  
  
  
Thrombocytopenia, unspecified  
  
documented in this encounter  
[facility] note*   
  
                                                    Diagnosis  
   
                                                      
  
  
Liver disease  
  
  
Unspecified disorder of liver  
  
documented in this encounter  
[facility] Plan of care note* Visit Details  
  
                                                    Visit Type -PT SOC  
Discipline -Physical Therapy  
  
  
                                    Problems  
  
                    Problem   Description Start Date Status    Goals     Interve  
ntions  
   
                                                      
  
  
PT Learning   
Assessment  
  
  
Disciplines:  
  
  
PT                                                    
  
  
2022                                  
  
  
Active                                    
  
  
1 goal linked to   
scheduled/document  
ed intervention                           
  
  
1 goal   
intervention   
scheduled/document  
ed in this visit  
   
                                                      
  
  
Medication   
Education  
  
  
Disciplines:  
  
  
Skilled   
Services                                              
  
  
2022                                  
  
  
Active                                    
  
  
1 goal linked to   
scheduled/document  
ed intervention                           
  
  
1 goal   
intervention   
scheduled/document  
ed in this visit  
   
                                                      
  
  
Physician   
Specific   
Parameters  
  
  
Disciplines:  
  
  
Skilled   
Services                                              
  
  
2022                                  
  
  
Active                                    
  
  
1 goal linked to   
scheduled/document  
ed intervention                           
  
  
1 goal   
intervention   
scheduled/document  
ed in this visit  
   
                                                      
  
  
Risk for Falls  
  
  
Disciplines:  
  
  
Skilled   
Services                                              
  
  
2022                                  
  
  
Active                                    
  
  
1 goal linked to   
scheduled/document  
ed intervention                           
  
  
1 goal   
intervention   
scheduled/document  
ed in this visit  
   
                                                      
  
  
Advance   
Directives  
  
  
Disciplines:  
  
  
Skilled   
Services                                              
  
  
2022                                  
  
  
Resolved on   
2022                                  
  
  
1 goal linked to   
scheduled/document  
ed intervention                           
  
  
1 goal   
intervention   
scheduled/document  
ed in this visit  
  
  
                                      Goals  
  
                      Goal       Associated Problem Outcome    Goal Met?  Visit   
Notes  
   
                                                      
  
  
Demonstrate understanding of   
education  
  
  
Description:  
  
  
Patient and/or caregiver   
will understand educational   
instruction to be achieved   
by 10/1/22. .                             
  
  
PT Learning Assessment                    
  
                                          
  
  
No                                        
   
                                                      
  
  
Patient/caregiver will   
demonstrate ability to   
obtain, store, identify and   
administer ordered   
medications, keep accurate   
medication list in home, and   
adhere to medication   
schedule  
  
  
Description:  
  
  
Patient/caregiver will   
demonstrate ability to   
obtain, store, identify and   
administer ordered   
medications, keep accurate   
medication list in home, and   
adhere to medication   
schedule by 10/1/22.  
  
                                          
  
  
Medication Education                      
  
                                          
  
  
No                                        
   
                                                      
  
  
Patient to maintain   
parameters within   
physician-specified ranges   
throughout certification   
period                                    
  
  
Physician Specific   
Parameters                                
  
                                          
  
  
No                                        
   
                                                      
  
  
Manage Risk for falls  
  
  
Description:  
  
  
Patient/caregiver will   
verbalize knowledge of   
individualized fall   
prevention strategies by   
10/1/22. .                                
  
  
Risk for Falls                            
  
                                          
  
  
No                                        
   
                                                      
  
  
Patient/caregiver will make   
healthcare providers aware   
of and any changes to   
Advance Directives   
throughout certification   
period                                    
  
  
Advance Directives                        
  
  
Completed                                 
  
  
Yes                                       
  
  
                                  Interventions  
  
                                        Intervention        Associated   
Problem/Goal        Status              Variance            Visit Notes  
   
                                                      
  
  
Instruct and educate   
on knowledge deficits                   Problem:PT Learning   
Assessment  
Goal:Demonstrate   
understanding of   
education                                 
  
  
Completed                                 
  
                                          
  
  
patient verbalize   
and/or demonstrate   
understanding of   
physical therapy   
education including   
fall prevention   
strategies, home   
safety and home   
exercise program.  
  
  
  
  
  
Education methods   
include: verbal cues.  
  
  
  
  
  
Further education   
required to improve   
knowledge and   
compliance with fall   
prevention   
strategies, home   
safety, functional   
activity and home   
exercise program.  
   
                                                      
  
  
Medication Education  
  
  
Description:  
  
  
Evaluate/instruct   
patient/caregiver on   
obtaining, storing,   
identifying and   
administering ordered   
medications as well as   
keeping accurate   
medication list in the   
home and adhereing to   
medication schedule                     Problem:Medication   
Education  
Goal:Patient/caregive  
r will demonstrate   
ability to obtain,   
store, identify and   
administer ordered   
medications, keep   
accurate medication   
list in home, and   
adhere to medication   
schedule                                  
  
  
Completed                                 
  
                                          
  
  
Patient instructed on   
importance of keeping   
accurate medication   
list in home and   
adhering to   
medication schedule.  
   
                                                      
  
  
SPO2  
  
  
Description:  
  
  
Notify Dr. Ahuja   
if pulse ox is <92% at   
rest.                                   Problem:Physician   
Specific Parameters  
Goal:Patient to   
maintain parameters   
within   
physician-specified   
ranges throughout   
certification period                      
  
  
Completed                                 
  
                                          
   
                                                      
  
  
Instruct on individual   
fall risk factors and   
strategies to prevent   
falls and injuries   
caused by falls.                        Problem:Risk for   
Falls  
Goal:Manage Risk for   
falls                                     
  
  
Completed                                 
  
                                          
  
  
PT: Patient   
instructed on  
  
  
Eliminating   
Environmental   
Hazards: Keep   
pathways clear, Keep   
pets out of pathways,   
Remove unsafe rugs,   
Move furniture from   
pathways and Wear   
supportive shoes or   
non-skid socks  
  
  
Managing Impaired   
Functional Mobility:   
Use assistive   
device(s): front   
wheeled walker  
  
  
  
  
  
   
                                                      
  
  
Determine patient's   
Advance Directive   
Status  
  
  
Description:  
  
  
Patient does have   
advance directives.  
  
  
Patient's Advance   
Directives determined   
to be  
  
  
available in Home and   
[facility] DPOA   
and Living Will.  
  
  
  
  
  
  
  
  
  
  
                                        Problem:Advance   
Directives  
Goal:Patient/caregive  
r will make   
healthcare providers   
aware of and any   
changes to Advance   
Directives throughout   
certification period                      
  
  
Completed                                 
  
                                          
  
  
Discussed Advance   
Directives with   
Patient and/or   
Caregiver.  
  
  
Referred patient to   
Home Care handbook   
for further   
information on   
Healthcare DPOA &   
Living Will.  
  
documented in this encounter  
[facility] Details  
  
                                                    Visit Type -PT ROUTINE  
Discipline -Physical Therapy  
  
  
                                    Problems  
  
                    Problem   Description Start Date Status    Goals     Interve  
ntions  
   
                                                      
  
  
PT Impaired   
muscle   
performance   
and/or ROM  
  
  
Disciplines:  
  
  
PT                                                    
  
  
2022                                  
  
  
Active                                    
  
  
1 goal linked to   
scheduled/document  
ed intervention                           
  
  
1 goal   
intervention   
scheduled/document  
ed in this visit  
   
                                                      
  
  
PT Impaired gait  
  
  
Disciplines:  
  
  
PT                                                    
  
  
2022                                  
  
  
Active                                    
  
  
1 goal linked to   
scheduled/document  
ed intervention                           
  
  
1 goal   
intervention   
scheduled/document  
ed in this visit  
   
                                                      
  
  
PT Learning   
Assessment  
  
  
Disciplines:  
  
  
PT                                                    
  
  
2022                                  
  
  
Active                                    
  
  
1 goal linked to   
scheduled/document  
ed intervention                           
  
  
1 goal   
intervention   
scheduled/document  
ed in this visit  
   
                                                      
  
  
Medication   
Education  
  
  
Disciplines:  
  
  
Skilled Services                                      
  
  
2022                                  
  
  
Active                                    
  
  
1 goal linked to   
scheduled/document  
ed intervention                           
  
  
1 goal   
intervention   
scheduled/document  
ed in this visit  
   
                                                      
  
  
Sepsis  
  
  
Disciplines:  
  
  
Skilled Services                                      
  
  
2022                                  
  
  
Active                                    
  
  
1 goal linked to   
scheduled/document  
ed intervention                           
  
  
1 goal   
intervention   
scheduled/document  
ed in this visit  
   
                                                      
  
  
Physician   
Specific   
Parameters  
  
  
Disciplines:  
  
  
Skilled Services                                      
  
  
2022                                  
  
  
Active                                    
  
  
1 goal linked to   
scheduled/document  
ed intervention                           
  
  
1 goal   
intervention   
scheduled/document  
ed in this visit  
   
                                                      
  
  
Risk for Falls  
  
  
Disciplines:  
  
  
Skilled Services                                      
  
  
2022                                  
  
  
Active                                    
  
  
1 goal linked to   
scheduled/document  
ed intervention                           
  
  
1 goal   
intervention   
scheduled/document  
ed in this visit  
  
  
                                      Goals  
  
                      Goal       Associated Problem Outcome    Goal Met?  Visit   
Notes  
   
                                                      
  
  
Improved Muscle Performance   
and/or ROM  
  
  
Description:  
  
  
LTG: Patient will demonstrate   
improved muscle performance   
to meet functional goals as   
evidenced by ability to   
tolerate 5 sit to stands in   
30 sec and tolerate 10 min of   
a standing activity, to be   
achieved by 10/1/22. .  
  
  
  
  
  
STG: Patient and/or caregiver   
will verbalize/demonstrate   
independence with home   
exercise program, to improve   
functional mobility, to be   
achieved by 9/10/22.  
  
  
  
  
  
LTG: Patient will demonstrate   
improved left hip active   
range of motion to 100* in   
standing degrees, to meet   
functional goals, to be   
achieved by 10/1/22. .                    
  
  
PT Impaired muscle   
performance and/or ROM                    
  
                                          
  
  
No                                        
   
                                                      
  
  
Improved Gait  
  
  
Description:  
  
  
  
  
  
LTG: Patient will demonstrate   
improved gait ability as   
evidenced by ambulation 150   
feet with rollator walker   
independently with AD, to   
return to safe household and   
community ambulation, in   
order to return to plf , to   
be achieved by 10/1/22. .  
  
  
  
  
                                          
  
  
PT Impaired gait                          
  
                                          
  
  
No                                        
   
                                                      
  
  
Demonstrate understanding of   
education  
  
  
Description:  
  
  
Patient and/or caregiver will   
understand educational   
instruction to be achieved by   
10/1/22. .                                
  
  
PT Learning Assessment                    
  
                                          
  
  
No                                        
   
                                                      
  
  
Patient/caregiver will   
demonstrate ability to   
obtain, store, identify and   
administer ordered   
medications, keep accurate   
medication list in home, and   
adhere to medication schedule  
  
  
Description:  
  
  
Patient/caregiver will   
demonstrate ability to   
obtain, store, identify and   
administer ordered   
medications, keep accurate   
medication list in home, and   
adhere to medication schedule   
by 10/1/22.  
  
                                          
  
  
Medication Education                      
  
                                          
  
  
No                                        
   
                                                      
  
  
Patient/caregiver will be   
able to identify and report   
symptoms of sepsis  
  
  
Description:  
  
  
Patient/caregiver will be   
able to identify   
signs/symptoms of sepsis   
infection and will verbalize   
actions to take if suspected   
by 10/1/22. .                             
  
  
Sepsis                                    
  
                                          
  
  
No                                        
   
                                                      
  
  
Patient to maintain   
parameters within   
physician-specified ranges   
throughout certification   
period                                    
  
  
Physician Specific   
Parameters                                
  
                                          
  
  
No                                        
   
                                                      
  
  
Manage Risk for falls  
  
  
Description:  
  
  
Patient/caregiver will   
verbalize knowledge of   
individualized fall   
prevention strategies by   
10/1/22. .                                
  
  
Risk for Falls                            
  
                                          
  
  
No                                        
  
  
                                  Interventions  
  
                                        Intervention        Associated   
Problem/Goal        Status              Variance            Visit Notes  
   
                                                      
  
  
Physical Therapy   
Therapeutic Exercises                   Problem:PT Impaired   
muscle performance   
and/or ROM  
Goal:Improved Muscle   
Performance and/or   
ROM                                       
  
  
Completed                                 
  
                                          
  
  
patient instructed on   
strengthening   
exercises including   
Supine : GS,QS,HS,   
HIPADD, HIP BD, HEEL   
SLIDES, SAQ, X 10  
  
  
  
  
  
SEATED: FAQ, HIP   
FLEX, ,HIP ADD, HIP   
ABD AP X10  
  
  
with verbal and   
tactile cues for   
TECHNIQUE .  
   
                                                      
  
  
Physical Therapy Gait   
Training                                Problem:PT Impaired   
gait  
Goal:Improved Gait                        
  
  
Completed                                 
  
                                          
  
  
Gait training and   
instruction to   
patient on safe   
ambulation with front   
wheeled walker for   
40' x2 feet with   
supervision, with   
verbal cues for   
corrections of gait   
deviations. Pt tends   
to stand more upright   
with his WW vs the   
rollator .  
   
                                                      
  
  
Instruct and educate   
on knowledge deficits                   Problem:PT Learning   
Assessment  
Goal:Demonstrate   
understanding of   
education                                 
  
  
Completed                                 
  
                                          
  
  
patient verbalize   
and/or demonstrate   
understanding of   
physical therapy   
education including   
fall prevention   
strategies and home   
safety.  
  
  
  
  
  
Education methods   
include: verbal cues.  
  
  
  
  
  
Further education   
required to improve   
knowledge and   
compliance with   
pulmonary disease   
management,   
nutrition, home   
safety, functional   
activity and home   
exercise program.  
   
                                                      
  
  
Medication Education  
  
  
Description:  
  
  
Evaluate/instruct   
patient/caregiver on   
obtaining, storing,   
identifying and   
administering ordered   
medications as well as   
keeping accurate   
medication list in the   
home and adhereing to   
medication schedule                     Problem:Medication   
Education  
Goal:Patient/caregive  
r will demonstrate   
ability to obtain,   
store, identify and   
administer ordered   
medications, keep   
accurate medication   
list in home, and   
adhere to medication   
schedule                                  
  
  
Completed                                 
  
                                          
  
  
Patient instructed on   
importance of keeping   
accurate medication   
list in home and   
adhering to   
medication schedule.  
   
                                                      
  
  
Risk of Sepsis  
  
  
Description:  
  
  
Patient is at risk for   
sepsis. Monitor   
closely for s/s of   
sepsis.                                 Problem:Sepsis  
Goal:Patient/caregive  
r will be able to   
identify and report   
symptoms of sepsis                        
  
  
Completed                                 
  
                                          
   
                                                      
  
  
SPO2  
  
  
Description:  
  
  
Notify Dr. Ahuja   
if pulse ox is <92% at   
rest.                                   Problem:Physician   
Specific Parameters  
Goal:Patient to   
maintain parameters   
within   
physician-specified   
ranges throughout   
certification period                      
  
  
Completed                                 
  
                                          
   
                                                      
  
  
Instruct on individual   
fall risk factors and   
strategies to prevent   
falls and injuries   
caused by falls.                        Problem:Risk for   
Falls  
Goal:Manage Risk for   
falls                                     
  
  
Completed                                 
  
                                          
  
  
PT: Patient   
instructed on  
  
  
Eliminating   
Environmental   
Hazards: Keep   
pathways clear, Keep   
pets out of pathways   
and Remove unsafe   
rugs  
  
  
Managing Impaired   
Functional Mobility:   
Use assistive   
device(s): front   
wheeled walker  
  
  
  
  
  
  
documented in this encounter  
[facility]'s home Plan of care note* Visit Details  
  
                                                    Visit Type -PTA ROUTINE  
Discipline -Physical Therapy  
  
  
                                    Problems  
  
                    Problem   Description Start Date Status    Goals     Interve  
ntions  
   
                                                      
  
  
PT Impaired   
muscle   
performance   
and/or ROM  
  
  
Disciplines:  
  
  
PT                                                    
  
  
2022                                  
  
  
Active                                    
  
  
1 goal linked to   
scheduled/document  
ed intervention                           
  
  
1 goal   
intervention   
scheduled/document  
ed in this visit  
   
                                                      
  
  
PT Impaired gait  
  
  
Disciplines:  
  
  
PT                                                    
  
  
2022                                  
  
  
Active                                    
  
  
1 goal linked to   
scheduled/document  
ed intervention                           
  
  
1 goal   
intervention   
scheduled/document  
ed in this visit  
   
                                                      
  
  
PT Learning   
Assessment  
  
  
Disciplines:  
  
  
PT                                                    
  
  
2022                                  
  
  
Active                                    
  
  
1 goal linked to   
scheduled/document  
ed intervention                           
  
  
1 goal   
intervention   
scheduled/document  
ed in this visit  
   
                                                      
  
  
Medication   
Education  
  
  
Disciplines:  
  
  
Skilled Services                                      
  
  
2022                                  
  
  
Active                                    
  
  
1 goal linked to   
scheduled/document  
ed intervention                           
  
  
1 goal   
intervention   
scheduled/document  
ed in this visit  
   
                                                      
  
  
Sepsis  
  
  
Disciplines:  
  
  
Skilled Services                                      
  
  
2022                                  
  
  
Active                                    
  
  
1 goal linked to   
scheduled/document  
ed intervention                           
  
  
1 goal   
intervention   
scheduled/document  
ed in this visit  
   
                                                      
  
  
Physician   
Specific   
Parameters  
  
  
Disciplines:  
  
  
Skilled Services                                      
  
  
2022                                  
  
  
Active                                    
  
  
1 goal linked to   
scheduled/document  
ed intervention                           
  
  
1 goal   
intervention   
scheduled/document  
ed in this visit  
   
                                                      
  
  
Risk for Falls  
  
  
Disciplines:  
  
  
Skilled Services                                      
  
  
2022                                  
  
  
Active                                    
  
  
1 goal linked to   
scheduled/document  
ed intervention                           
  
  
1 goal   
intervention   
scheduled/document  
ed in this visit  
   
                                                      
  
  
Pain  
  
  
Disciplines:  
  
  
Skilled Services                                      
  
  
2022                                  
  
  
Active                                    
  
  
1 goal linked to   
scheduled/document  
ed intervention                           
  
  
1 goal   
intervention   
scheduled/document  
ed in this visit  
   
                                                      
  
  
Discharge  
  
  
Disciplines:  
  
  
Skilled Services                                      
  
  
2022                                  
  
  
Active                                    
  
  
1 goal linked to   
scheduled/document  
ed intervention                           
  
  
1 goal   
intervention   
scheduled/document  
ed in this visit  
  
  
                                      Goals  
  
                      Goal       Associated Problem Outcome    Goal Met?  Visit   
Notes  
   
                                                      
  
  
Improved Muscle Performance   
and/or ROM  
  
  
Description:  
  
  
LTG: Patient will demonstrate   
improved muscle performance   
to meet functional goals as   
evidenced by ability to   
tolerate 5 sit to stands in   
30 sec and tolerate 10 min of   
a standing activity, to be   
achieved by 10/1/22. .  
  
  
  
  
  
STG: Patient and/or caregiver   
will verbalize/demonstrate   
independence with home   
exercise program, to improve   
functional mobility, to be   
achieved by 9/10/22.  
  
  
  
  
  
LTG: Patient will demonstrate   
improved left hip active   
range of motion to 100* in   
standing degrees, to meet   
functional goals, to be   
achieved by 10/1/22. .                    
  
  
PT Impaired muscle   
performance and/or ROM                    
  
                                          
  
  
No                                        
   
                                                      
  
  
Improved Gait  
  
  
Description:  
  
  
  
  
  
LTG: Patient will demonstrate   
improved gait ability as   
evidenced by ambulation 150   
feet with rollator walker   
independently with AD, to   
return to safe household and   
community ambulation, in   
order to return to plf , to   
be achieved by 10/1/22. .  
  
  
  
  
                                          
  
  
PT Impaired gait                          
  
                                          
  
  
No                                        
   
                                                      
  
  
Demonstrate understanding of   
education  
  
  
Description:  
  
  
Patient and/or caregiver will   
understand educational   
instruction to be achieved by   
10/1/22. .                                
  
  
PT Learning Assessment                    
  
                                          
  
  
No                                        
   
                                                      
  
  
Patient/caregiver will   
demonstrate ability to   
obtain, store, identify and   
administer ordered   
medications, keep accurate   
medication list in home, and   
adhere to medication schedule  
  
  
Description:  
  
  
Patient/caregiver will   
demonstrate ability to   
obtain, store, identify and   
administer ordered   
medications, keep accurate   
medication list in home, and   
adhere to medication schedule   
by 10/1/22.  
  
                                          
  
  
Medication Education                      
  
                                          
  
  
No                                        
   
                                                      
  
  
Patient/caregiver will be   
able to identify and report   
symptoms of sepsis  
  
  
Description:  
  
  
Patient/caregiver will be   
able to identify   
signs/symptoms of sepsis   
infection and will verbalize   
actions to take if suspected   
by 10/1/22. .                             
  
  
Sepsis                                    
  
                                          
  
  
No                                        
   
                                                      
  
  
Patient to maintain   
parameters within   
physician-specified ranges   
throughout certification   
period                                    
  
  
Physician Specific   
Parameters                                
  
                                          
  
  
No                                        
   
                                                      
  
  
Manage Risk for falls  
  
  
Description:  
  
  
Patient/caregiver will   
verbalize knowledge of   
individualized fall   
prevention strategies by   
10/1/22. .                                
  
  
Risk for Falls                            
  
                                          
  
  
No                                        
   
                                                      
  
  
Manage Pain  
  
  
Description:  
  
  
Patient/caregiver will   
verbalize knowledge and   
understanding of appropriate   
techniques to control pain,   
including pain medication and   
non-pharmacological   
techniques. Patient will   
verbalize or demonstrate an   
acceptable level of pain as   
evidenced by a pain score of   
<5/10 and improvement in   
ability to perform activities   
of daily living to be   
achieved by 10/1/22. .                    
  
  
Pain                                      
  
                                          
  
  
No                                        
   
                                                      
  
  
Manage discharge planning  
  
  
Description:  
  
  
Patient/caregiver will   
verbalize understanding of   
ongoing discharge plan   
provided related to disease   
management, arrangements for   
outpatient and/or community   
services, obtaining   
medications, supplies, and   
DME, as needed throughout   
certification period.                     
  
  
Discharge                                 
  
                                          
  
  
No                                        
  
  
                                  Interventions  
  
                                        Intervention        Associated   
Problem/Goal        Status              Variance            Visit Notes  
   
                                                      
  
  
Physical Therapy   
Therapeutic Exercises                   Problem:PT Impaired   
muscle performance   
and/or ROM  
Goal:Improved Muscle   
Performance and/or   
ROM                                       
  
  
Completed                                 
  
                                          
  
  
patient instructed on   
strengthening   
exercises including   
supine: ankle pumps,   
quad sets, hip abd and   
adduction, saq and   
heel slides x's 10   
each. seated: laq. hip   
flexion and heel toe   
raises x's 10 each   
with verbal cues for   
correct form and pace.   
patient instructed to   
perform home exercise   
program twice a day   
which included above   
exercises.  
   
                                                      
  
  
Physical Therapy Gait   
Training                                Problem:PT Impaired   
gait  
Goal:Improved Gait                        
  
  
Completed                                 
  
                                          
  
  
Gait training and   
instruction to patient   
on safe ambulation   
with rollator walker   
for 2x's 40 feet with   
supervision, with   
verbal cues for   
corrections of gait   
deviations including   
staying closer to   
rollator.  
   
                                                      
  
  
Instruct and educate   
on knowledge deficits                   Problem:PT Learning   
Assessment  
Goal:Demonstrate   
understanding of   
education                                 
  
  
Completed                                 
  
                                          
  
  
patient verbalize   
and/or demonstrate   
understanding of   
physical therapy   
education including   
home exercise program.  
  
  
  
  
  
Education methods   
include: verbal cues   
and visual cues.  
  
  
  
  
  
Further education   
required to improve   
knowledge and   
compliance with home   
exercise program.  
   
                                                      
  
  
Medication Education  
  
  
Description:  
  
  
Evaluate/instruct   
patient/caregiver on   
obtaining, storing,   
identifying and   
administering ordered   
medications as well   
as keeping accurate   
medication list in   
the home and   
adhereing to   
medication schedule                     Problem:Medication   
Education  
Goal:Patient/caregive  
r will demonstrate   
ability to obtain,   
store, identify and   
administer ordered   
medications, keep   
accurate medication   
list in home, and   
adhere to medication   
schedule                                  
  
  
Completed                                 
  
                                          
  
  
Patient instructed on   
importance of keeping   
accurate medication   
list in home.  
   
                                                      
  
  
Risk of Sepsis  
  
  
Description:  
  
  
Patient is at risk   
for sepsis. Monitor   
closely for s/s of   
sepsis.                                 Problem:Sepsis  
Goal:Patient/caregive  
r will be able to   
identify and report   
symptoms of sepsis                        
  
  
Completed                                 
  
                                          
   
                                                      
  
  
SPO2  
  
  
Description:  
  
  
Notify Dr. Ahuja   
if pulse ox is <92%   
at rest.                                Problem:Physician   
Specific Parameters  
Goal:Patient to   
maintain parameters   
within   
physician-specified   
ranges throughout   
certification period                      
  
  
Completed                                 
  
                                          
   
                                                      
  
  
Instruct on   
individual fall risk   
factors and   
strategies to prevent   
falls and injuries   
caused by falls.                        Problem:Risk for   
Falls  
Goal:Manage Risk for   
falls                                     
  
  
Completed                                 
  
                                          
  
  
PT: Patient instructed   
on Managing Impaired   
Functional Mobility:   
Use assistive   
device(s): rollator   
walker  
  
  
   
                                                      
  
  
Instruct on pain and   
instruct on   
strategies to control   
pain                                    Problem:Pain  
Goal:Manage Pain                          
  
  
Completed                                 
  
                                          
  
  
patient instructed on   
techniques to control   
pain including   
Pharmacological   
measures and   
Non-Pharmacological   
measures; distraction.  
   
                                                      
  
  
Instruct on ongoing   
discharge plan                          Problem:Discharge  
Goal:Manage discharge   
planning                                  
  
  
Completed                                 
  
                                          
  
  
Ongoing Discharge   
plan: Discharge plan   
discussed with patient   
including frequency   
and duration for home   
PT and plan for   
transition to:   
caregiver assistance.  
  
  
  
documented in this encounter  
[facility]'s home Plan of care note* Visit Details  
  
                                                    Visit Type -PTA ROUTINE  
Discipline -Physical Therapy  
  
  
                                    Problems  
  
                    Problem   Description Start Date Status    Goals     Interve  
ntions  
   
                                                      
  
  
PT Impaired   
muscle   
performance   
and/or ROM  
  
  
Disciplines:  
  
  
PT                                                    
  
  
2022                                  
  
  
Active                                    
  
  
1 goal linked to   
scheduled/document  
ed intervention                           
  
  
1 goal   
intervention   
scheduled/document  
ed in this visit  
   
                                                      
  
  
PT Impaired gait  
  
  
Disciplines:  
  
  
PT                                                    
  
  
2022                                  
  
  
Active                                    
  
  
1 goal linked to   
scheduled/document  
ed intervention                           
  
  
1 goal   
intervention   
scheduled/document  
ed in this visit  
   
                                                      
  
  
PT Impaired   
balance  
  
  
Disciplines:  
  
  
PT                                                    
  
  
2022                                  
  
  
Active                                    
  
  
1 goal linked to   
scheduled/document  
ed intervention                           
  
  
1 goal   
intervention   
scheduled/document  
ed in this visit  
   
                                                      
  
  
PT Learning   
Assessment  
  
  
Disciplines:  
  
  
PT                                                    
  
  
2022                                  
  
  
Active                                    
  
  
1 goal linked to   
scheduled/document  
ed intervention                           
  
  
1 goal   
intervention   
scheduled/document  
ed in this visit  
   
                                                      
  
  
Medication   
Education  
  
  
Disciplines:  
  
  
Skilled Services                                      
  
  
2022                                  
  
  
Active                                    
  
  
1 goal linked to   
scheduled/document  
ed intervention                           
  
  
1 goal   
intervention   
scheduled/document  
ed in this visit  
   
                                                      
  
  
Sepsis  
  
  
Disciplines:  
  
  
Skilled Services                                      
  
  
2022                                  
  
  
Active                                    
  
  
1 goal linked to   
scheduled/document  
ed intervention                           
  
  
1 goal   
intervention   
scheduled/document  
ed in this visit  
   
                                                      
  
  
Physician   
Specific   
Parameters  
  
  
Disciplines:  
  
  
Skilled Services                                      
  
  
2022                                  
  
  
Active                                    
  
  
1 goal linked to   
scheduled/document  
ed intervention                           
  
  
1 goal   
intervention   
scheduled/document  
ed in this visit  
   
                                                      
  
  
Risk for Falls  
  
  
Disciplines:  
  
  
Skilled Services                                      
  
  
2022                                  
  
  
Active                                    
  
  
1 goal linked to   
scheduled/document  
ed intervention                           
  
  
1 goal   
intervention   
scheduled/document  
ed in this visit  
   
                                                      
  
  
Discharge  
  
  
Disciplines:  
  
  
Skilled Services                                      
  
  
2022                                  
  
  
Active                                    
  
  
1 goal linked to   
scheduled/document  
ed intervention                           
  
  
1 goal   
intervention   
scheduled/document  
ed in this visit  
  
  
                                      Goals  
  
                      Goal       Associated Problem Outcome    Goal Met?  Visit   
Notes  
   
                                                      
  
  
Improved Muscle Performance   
and/or ROM  
  
  
Description:  
  
  
LTG: Patient will demonstrate   
improved muscle performance   
to meet functional goals as   
evidenced by ability to   
tolerate 5 sit to stands in   
30 sec and tolerate 10 min of   
a standing activity, to be   
achieved by 10/1/22. .  
  
  
  
  
  
STG: Patient and/or caregiver   
will verbalize/demonstrate   
independence with home   
exercise program, to improve   
functional mobility, to be   
achieved by 9/10/22.  
  
  
  
  
  
LTG: Patient will demonstrate   
improved left hip active   
range of motion to 100* in   
standing degrees, to meet   
functional goals, to be   
achieved by 10/1/22. .                    
  
  
PT Impaired muscle   
performance and/or ROM                    
  
                                          
  
  
No                                        
   
                                                      
  
  
Improved Gait  
  
  
Description:  
  
  
  
  
  
LTG: Patient will demonstrate   
improved gait ability as   
evidenced by ambulation 150   
feet with rollator walker   
independently with AD, to   
return to safe household and   
community ambulation, in   
order to return to plf , to   
be achieved by 10/1/22. .  
  
  
  
  
                                          
  
  
PT Impaired gait                          
  
                                          
  
  
No                                        
   
                                                      
  
  
Improved Balance  
  
  
Description:  
  
  
  
  
  
LTG: Patient will demonstrate   
improved standing balance to   
meet functional goals as   
evidenced by TUG score of <28   
to be achieved by 10/1/22. .              
  
  
PT Impaired balance                       
  
                                          
  
  
No                                        
   
                                                      
  
  
Demonstrate understanding of   
education  
  
  
Description:  
  
  
Patient and/or caregiver will   
understand educational   
instruction to be achieved by   
10/1/22. .                                
  
  
PT Learning Assessment                    
  
                                          
  
  
No                                        
   
                                                      
  
  
Patient/caregiver will   
demonstrate ability to   
obtain, store, identify and   
administer ordered   
medications, keep accurate   
medication list in home, and   
adhere to medication schedule  
  
  
Description:  
  
  
Patient/caregiver will   
demonstrate ability to   
obtain, store, identify and   
administer ordered   
medications, keep accurate   
medication list in home, and   
adhere to medication schedule   
by 10/1/22.  
  
                                          
  
  
Medication Education                      
  
                                          
  
  
No                                        
   
                                                      
  
  
Patient/caregiver will be   
able to identify and report   
symptoms of sepsis  
  
  
Description:  
  
  
Patient/caregiver will be   
able to identify   
signs/symptoms of sepsis   
infection and will verbalize   
actions to take if suspected   
by 10/1/22. .                             
  
  
Sepsis                                    
  
                                          
  
  
No                                        
   
                                                      
  
  
Patient to maintain   
parameters within   
physician-specified ranges   
throughout certification   
period                                    
  
  
Physician Specific   
Parameters                                
  
                                          
  
  
No                                        
   
                                                      
  
  
Manage Risk for falls  
  
  
Description:  
  
  
Patient/caregiver will   
verbalize knowledge of   
individualized fall   
prevention strategies by   
10/1/22. .                                
  
  
Risk for Falls                            
  
                                          
  
  
No                                        
   
                                                      
  
  
Manage discharge planning  
  
  
Description:  
  
  
Patient/caregiver will   
verbalize understanding of   
ongoing discharge plan   
provided related to disease   
management, arrangements for   
outpatient and/or community   
services, obtaining   
medications, supplies, and   
DME, as needed throughout   
certification period.                     
  
  
Discharge                                 
  
                                          
  
  
No                                        
  
  
                                  Interventions  
  
                                        Intervention        Associated   
Problem/Goal        Status              Variance            Visit Notes  
   
                                                      
  
  
Physical Therapy   
Therapeutic Exercises                   Problem:PT Impaired   
muscle performance   
and/or ROM  
Goal:Improved Muscle   
Performance and/or   
ROM                                       
  
  
Completed                                 
  
                                          
  
  
patient instructed on   
strengthening   
exercises including   
seated: laq, hip   
flexion and adduction   
and heel toe raies   
x's 10 each. standing   
: hamstring curls and   
hip abduction x's 10   
each with verbal,   
visual and written   
cues for correct form   
and pace. patient   
instructed to perform   
home exercise program   
twice a day which   
included above   
exercises.  
   
                                                      
  
  
Physical Therapy Gait   
Training                                Problem:PT Impaired   
gait  
Goal:Improved Gait                        
  
  
Completed                                 
  
                                          
  
  
Gait training and   
instruction to   
patient on safe   
ambulation with   
rollator walker for   
2x's 80 feet with   
supervision, with   
verbal cues for   
corrections of gait   
deviations including   
pacing for safety and   
energy conservation.  
   
                                                      
  
  
Physical Therapy   
Balance Training                        Problem:PT Impaired   
balance  
Goal:Improved Balance                     
  
  
Completed                                 
  
                                          
  
  
Developed,   
implemented, and   
instructed patient on   
standing balance   
exercises including   
lateral stepping at   
counter 5feet x's 2   
w/ David UE support on   
counter .  
   
                                                      
  
  
Instruct and educate   
on knowledge deficits                   Problem:PT Learning   
Assessment  
Goal:Demonstrate   
understanding of   
education                                 
  
  
Completed                                 
  
                                          
  
  
patient verbalize   
and/or demonstrate   
understanding of   
physical therapy   
education including   
home exercise   
program.  
  
  
  
  
  
Education methods   
include: verbal cues,   
written instructions   
and visual cues.  
  
  
  
  
  
Further education   
required to improve   
knowledge and   
compliance with home   
exercise program.  
   
                                                      
  
  
Medication Education  
  
  
Description:  
  
  
Evaluate/instruct   
patient/caregiver on   
obtaining, storing,   
identifying and   
administering ordered   
medications as well as   
keeping accurate   
medication list in the   
home and adhereing to   
medication schedule                     Problem:Medication   
Education  
Goal:Patient/caregive  
r will demonstrate   
ability to obtain,   
store, identify and   
administer ordered   
medications, keep   
accurate medication   
list in home, and   
adhere to medication   
schedule                                  
  
  
Completed                                 
  
                                          
  
  
Patient instructed on   
importance of keeping   
accurate medication   
list in home.  
   
                                                      
  
  
Risk of Sepsis  
  
  
Description:  
  
  
Patient is at risk for   
sepsis. Monitor   
closely for s/s of   
sepsis.                                 Problem:Sepsis  
Goal:Patient/caregive  
r will be able to   
identify and report   
symptoms of sepsis                        
  
  
Completed                                 
  
                                          
   
                                                      
  
  
SPO2  
  
  
Description:  
  
  
Notify Dr. Ahuja   
if pulse ox is <92% at   
rest.                                   Problem:Physician   
Specific Parameters  
Goal:Patient to   
maintain parameters   
within   
physician-specified   
ranges throughout   
certification period                      
  
  
Completed                                 
  
                                          
   
                                                      
  
  
Instruct on individual   
fall risk factors and   
strategies to prevent   
falls and injuries   
caused by falls.                        Problem:Risk for   
Falls  
Goal:Manage Risk for   
falls                                     
  
  
Completed                                 
  
                                          
  
  
PT: Patient   
instructed on   
Managing Impaired   
Functional Mobility:   
Use assistive   
device(s): rollator   
walker  
  
  
   
                                                      
  
  
Instruct on ongoing   
discharge plan                          Problem:Discharge  
Goal:Manage discharge   
planning                                  
  
  
Completed                                 
  
                                          
  
  
Ongoing Discharge   
plan: Discharge plan   
discussed with   
patient including   
frequency and   
duration for home PT   
and plan for   
transition to: live   
independently at home   
without ongoing   
services.  
  
  
  
documented in this encounter  
[facility]'s home Plan of care note* Visit Details  
  
                                                    Visit Type -PT ROUTINE  
Discipline -Physical Therapy  
  
  
                                    Problems  
  
                    Problem   Description Start Date Status    Goals     Interve  
ntions  
   
                                                      
  
  
PT Impaired   
muscle   
performance   
and/or ROM  
  
  
Disciplines:  
  
  
PT                                                    
  
  
2022                                  
  
  
Active                                    
  
  
1 goal linked to   
scheduled/document  
ed intervention                           
  
  
1 goal   
intervention   
scheduled/document  
ed in this visit  
   
                                                      
  
  
PT Impaired   
mobility  
  
  
Disciplines:  
  
  
PT                                                    
  
  
2022                                  
  
  
Active                                    
  
  
1 goal linked to   
scheduled/document  
ed intervention                           
  
  
1 goal   
intervention   
scheduled/document  
ed in this visit  
   
                                                      
  
  
PT Impaired gait  
  
  
Disciplines:  
  
  
PT                                                    
  
  
2022                                  
  
  
Active                                    
  
  
1 goal linked to   
scheduled/document  
ed intervention                           
  
  
1 goal   
intervention   
scheduled/document  
ed in this visit  
   
                                                      
  
  
PT Learning   
Assessment  
  
  
Disciplines:  
  
  
PT                                                    
  
  
2022                                  
  
  
Active                                    
  
  
1 goal linked to   
scheduled/document  
ed intervention                           
  
  
1 goal   
intervention   
scheduled/document  
ed in this visit  
   
                                                      
  
  
Medication   
Education  
  
  
Disciplines:  
  
  
Skilled Services                                      
  
  
2022                                  
  
  
Active                                    
  
  
1 goal linked to   
scheduled/document  
ed intervention                           
  
  
1 goal   
intervention   
scheduled/document  
ed in this visit  
   
                                                      
  
  
Sepsis  
  
  
Disciplines:  
  
  
Skilled Services                                      
  
  
2022                                  
  
  
Active                                    
  
  
1 goal linked to   
scheduled/document  
ed intervention                           
  
  
1 goal   
intervention   
scheduled/document  
ed in this visit  
   
                                                      
  
  
Physician   
Specific   
Parameters  
  
  
Disciplines:  
  
  
Skilled Services                                      
  
  
2022                                  
  
  
Active                                    
  
  
1 goal linked to   
scheduled/document  
ed intervention                           
  
  
1 goal   
intervention   
scheduled/document  
ed in this visit  
   
                                                      
  
  
Risk for Falls  
  
  
Disciplines:  
  
  
Skilled Services                                      
  
  
2022                                  
  
  
Active                                    
  
  
1 goal linked to   
scheduled/document  
ed intervention                           
  
  
1 goal   
intervention   
scheduled/document  
ed in this visit  
   
                                                      
  
  
Pain  
  
  
Disciplines:  
  
  
Skilled Services                                      
  
  
2022                                  
  
  
Active                                    
  
  
1 goal linked to   
scheduled/document  
ed intervention                           
  
  
1 goal   
intervention   
scheduled/document  
ed in this visit  
  
  
                                      Goals  
  
                      Goal       Associated Problem Outcome    Goal Met?  Visit   
Notes  
   
                                                      
  
  
Improved Muscle Performance   
and/or ROM  
  
  
Description:  
  
  
LTG: Patient will demonstrate   
improved muscle performance   
to meet functional goals as   
evidenced by ability to   
tolerate 5 sit to stands in   
30 sec and tolerate 10 min of   
a standing activity, to be   
achieved by 10/1/22. .  
  
  
  
  
  
STG: Patient and/or caregiver   
will verbalize/demonstrate   
independence with home   
exercise program, to improve   
functional mobility, to be   
achieved by 9/10/22.  
  
  
  
  
  
LTG: Patient will demonstrate   
improved left hip active   
range of motion to 100* in   
standing degrees, to meet   
functional goals, to be   
achieved by 10/1/22. .                    
  
  
PT Impaired muscle   
performance and/or ROM                    
  
                                          
  
  
No                                        
   
                                                      
  
  
Improved Transfers  
  
  
Description:  
  
  
  
  
  
  
  
  
LTG: Patient will demonstrate   
safe transfers to/from bed,   
chair, toilet, shower/tub and   
car independently with AD, to   
be achieved by 10/1/22. .  
  
                                          
  
  
PT Impaired mobility                      
  
                                          
  
  
No                                        
   
                                                      
  
  
Improved Gait  
  
  
Description:  
  
  
  
  
  
LTG: Patient will demonstrate   
improved gait ability as   
evidenced by ambulation 150   
feet with rollator walker   
independently with AD, to   
return to safe household and   
community ambulation, in   
order to return to plf , to   
be achieved by 10/1/22. .  
  
  
  
  
                                          
  
  
PT Impaired gait                          
  
                                          
  
  
No                                        
   
                                                      
  
  
Demonstrate understanding of   
education  
  
  
Description:  
  
  
Patient and/or caregiver will   
understand educational   
instruction to be achieved by   
10/1/22. .                                
  
  
PT Learning Assessment                    
  
                                          
  
  
No                                        
   
                                                      
  
  
Patient/caregiver will   
demonstrate ability to   
obtain, store, identify and   
administer ordered   
medications, keep accurate   
medication list in home, and   
adhere to medication schedule  
  
  
Description:  
  
  
Patient/caregiver will   
demonstrate ability to   
obtain, store, identify and   
administer ordered   
medications, keep accurate   
medication list in home, and   
adhere to medication schedule   
by 10/1/22.  
  
                                          
  
  
Medication Education                      
  
                                          
  
  
No                                        
   
                                                      
  
  
Patient/caregiver will be   
able to identify and report   
symptoms of sepsis  
  
  
Description:  
  
  
Patient/caregiver will be   
able to identify   
signs/symptoms of sepsis   
infection and will verbalize   
actions to take if suspected   
by 10/1/22. .                             
  
  
Sepsis                                    
  
                                          
  
  
No                                        
   
                                                      
  
  
Patient to maintain   
parameters within   
physician-specified ranges   
throughout certification   
period                                    
  
  
Physician Specific   
Parameters                                
  
                                          
  
  
No                                        
   
                                                      
  
  
Manage Risk for falls  
  
  
Description:  
  
  
Patient/caregiver will   
verbalize knowledge of   
individualized fall   
prevention strategies by   
10/1/22. .                                
  
  
Risk for Falls                            
  
                                          
  
  
No                                        
   
                                                      
  
  
Manage Pain  
  
  
Description:  
  
  
Patient/caregiver will   
verbalize knowledge and   
understanding of appropriate   
techniques to control pain,   
including pain medication and   
non-pharmacological   
techniques. Patient will   
verbalize or demonstrate an   
acceptable level of pain as   
evidenced by a pain score of   
<5/10 and improvement in   
ability to perform activities   
of daily living to be   
achieved by 10/1/22. .                    
  
  
Pain                                      
  
                                          
  
  
No                                        
  
  
                                  Interventions  
  
                                        Intervention        Associated   
Problem/Goal        Status              Variance            Visit Notes  
   
                                                      
  
  
Physical Therapy   
Therapeutic Exercises                   Problem:PT Impaired   
muscle performance   
and/or ROM  
Goal:Improved Muscle   
Performance and/or   
ROM                                       
  
  
Completed                                 
  
                                          
  
  
patient instructed on   
strengthening   
exercises including   
Supine : GS,QS,HS,   
HIPADD, HIP BD, HEEL   
SLIDES, SAQ,BRIDGING X   
15  
  
  
SEATED: FAQ, HIP FLEX,   
,HIP ADD, HIP ABD AP   
X10 with blue T band  
  
  
with verbal and   
tactile cues for   
TECHNIQUE .  
  
  
   
                                                      
  
  
Physical Therapy   
Transfer Training                       Problem:PT Impaired   
mobility  
Goal:Improved   
Transfers                                 
  
  
Completed                                 
  
                                          
  
  
Transfer training and   
instruction to patient   
on safe transfers to   
and from bed and chair   
with supervision and   
verbal cues for   
technique  
   
                                                      
  
  
Physical Therapy Gait   
Training                                Problem:PT Impaired   
gait  
Goal:Improved Gait                        
  
  
Completed                                 
  
                                          
  
  
Gait training and   
instruction to patient   
on safe ambulation   
with front wheeled   
walker for 40' x 3   
feet with supervision,   
with verbal cues for   
corrections of gait   
deviations including   
picking up his feet   
and no shuffling .  
   
                                                      
  
  
Instruct and educate   
on knowledge deficits                   Problem:PT Learning   
Assessment  
Goal:Demonstrate   
understanding of   
education                                 
  
  
Completed                                 
  
                                          
  
  
patient verbalize   
and/or demonstrate   
understanding of   
physical therapy   
education including   
cardiac disease   
management, pulmonary   
disease management,   
pain management, fall   
prevention strategies,   
home safety and   
functional activity.  
  
  
  
  
  
Education methods   
include: verbal cues.  
  
  
  
  
  
Further education   
required to improve   
knowledge and   
compliance with fall   
prevention strategies,   
home safety,   
functional activity   
and home exercise   
program.  
   
                                                      
  
  
Medication Education  
  
  
Description:  
  
  
Evaluate/instruct   
patient/caregiver on   
obtaining, storing,   
identifying and   
administering ordered   
medications as well   
as keeping accurate   
medication list in   
the home and   
adhereing to   
medication schedule                     Problem:Medication   
Education  
Goal:Patient/caregive  
r will demonstrate   
ability to obtain,   
store, identify and   
administer ordered   
medications, keep   
accurate medication   
list in home, and   
adhere to medication   
schedule                                  
  
  
Completed                                 
  
                                          
  
  
Patient instructed on   
importance of keeping   
accurate medication   
list in home and   
adhering to medication   
schedule.  
   
                                                      
  
  
Risk of Sepsis  
  
  
Description:  
  
  
Patient is at risk   
for sepsis. Monitor   
closely for s/s of   
sepsis.                                 Problem:Sepsis  
Goal:Patient/caregive  
r will be able to   
identify and report   
symptoms of sepsis                        
  
  
Completed                                 
  
                                          
   
                                                      
  
  
SPO2  
  
  
Description:  
  
  
Notify Dr. Ahuja   
if pulse ox is <92%   
at rest.                                Problem:Physician   
Specific Parameters  
Goal:Patient to   
maintain parameters   
within   
physician-specified   
ranges throughout   
certification period                      
  
  
Completed                                 
  
                                          
   
                                                      
  
  
Instruct on   
individual fall risk   
factors and   
strategies to prevent   
falls and injuries   
caused by falls.                        Problem:Risk for   
Falls  
Goal:Manage Risk for   
falls                                     
  
  
Completed                                 
  
                                          
  
  
PT: Patient instructed   
on  
  
  
Eliminating   
Environmental Hazards:   
Keep pathways clear,   
Keep pets out of   
pathways, Remove   
unsafe rugs and Move   
furniture from   
pathways  
  
  
Managing Impaired   
Functional Mobility:   
Use assistive   
device(s): front   
wheeled walker  
  
  
Managing Pain  
  
  
  
  
  
   
                                                      
  
  
Instruct on pain and   
instruct on   
strategies to control   
pain                                    Problem:Pain  
Goal:Manage Pain                          
  
  
Completed                                 
  
                                          
  
  
patient instructed on   
techniques to control   
pain including   
Pharmacological   
measures and   
Non-Pharmacological   
measures; rest and   
positioning/elevation.  
  
documented in this encounter  
[facility] home Plan of care note* Visit Details  
  
                                                    Visit Type -PTA ROUTINE  
Discipline -Physical Therapy  
  
  
                                    Problems  
  
                    Problem   Description Start Date Status    Goals     Interve  
ntions  
   
                                                      
  
  
PT Impaired   
muscle   
performance   
and/or ROM  
  
  
Disciplines:  
  
  
PT                                                    
  
  
2022                                  
  
  
Active                                    
  
  
1 goal linked to   
scheduled/document  
ed intervention                           
  
  
1 goal   
intervention   
scheduled/document  
ed in this visit  
   
                                                      
  
  
PT Impaired   
mobility  
  
  
Disciplines:  
  
  
PT                                                    
  
  
2022                                  
  
  
Active                                    
  
  
1 goal linked to   
scheduled/document  
ed intervention                           
  
  
1 goal   
intervention   
scheduled/document  
ed in this visit  
   
                                                      
  
  
PT Impaired gait  
  
  
Disciplines:  
  
  
PT                                                    
  
  
2022                                  
  
  
Active                                    
  
  
1 goal linked to   
scheduled/document  
ed intervention                           
  
  
1 goal   
intervention   
scheduled/document  
ed in this visit  
   
                                                      
  
  
PT Impaired   
balance  
  
  
Disciplines:  
  
  
PT                                                    
  
  
2022                                  
  
  
Active                                    
  
  
1 goal linked to   
scheduled/document  
ed intervention                           
  
  
1 goal   
intervention   
scheduled/document  
ed in this visit  
   
                                                      
  
  
PT Learning   
Assessment  
  
  
Disciplines:  
  
  
PT                                                    
  
  
2022                                  
  
  
Active                                    
  
  
1 goal linked to   
scheduled/document  
ed intervention                           
  
  
1 goal   
intervention   
scheduled/document  
ed in this visit  
   
                                                      
  
  
Medication   
Education  
  
  
Disciplines:  
  
  
Skilled Services                                      
  
  
2022                                  
  
  
Active                                    
  
  
1 goal linked to   
scheduled/document  
ed intervention                           
  
  
1 goal   
intervention   
scheduled/document  
ed in this visit  
   
                                                      
  
  
Sepsis  
  
  
Disciplines:  
  
  
Skilled Services                                      
  
  
2022                                  
  
  
Active                                    
  
  
1 goal linked to   
scheduled/document  
ed intervention                           
  
  
1 goal   
intervention   
scheduled/document  
ed in this visit  
   
                                                      
  
  
Physician   
Specific   
Parameters  
  
  
Disciplines:  
  
  
Skilled Services                                      
  
  
2022                                  
  
  
Active                                    
  
  
1 goal linked to   
scheduled/document  
ed intervention                           
  
  
1 goal   
intervention   
scheduled/document  
ed in this visit  
   
                                                      
  
  
Risk for Falls  
  
  
Disciplines:  
  
  
Skilled Services                                      
  
  
2022                                  
  
  
Active                                    
  
  
1 goal linked to   
scheduled/document  
ed intervention                           
  
  
1 goal   
intervention   
scheduled/document  
ed in this visit  
   
                                                      
  
  
Pain  
  
  
Disciplines:  
  
  
Skilled Services                                      
  
  
2022                                  
  
  
Active                                    
  
  
1 goal linked to   
scheduled/document  
ed intervention                           
  
  
1 goal   
intervention   
scheduled/document  
ed in this visit  
   
                                                      
  
  
Discharge  
  
  
Disciplines:  
  
  
Skilled Services                                      
  
  
2022                                  
  
  
Active                                    
  
  
1 goal linked to   
scheduled/document  
ed intervention                           
  
  
1 goal   
intervention   
scheduled/document  
ed in this visit  
  
  
                                      Goals  
  
                      Goal       Associated Problem Outcome    Goal Met?  Visit   
Notes  
   
                                                      
  
  
Improved Muscle Performance   
and/or ROM  
  
  
Description:  
  
  
LTG: Patient will demonstrate   
improved muscle performance   
to meet functional goals as   
evidenced by ability to   
tolerate 5 sit to stands in   
30 sec and tolerate 10 min of   
a standing activity, to be   
achieved by 10/1/22. .  
  
  
  
  
  
STG: Patient and/or caregiver   
will verbalize/demonstrate   
independence with home   
exercise program, to improve   
functional mobility, to be   
achieved by 9/10/22.  
  
  
  
  
  
LTG: Patient will demonstrate   
improved left hip active   
range of motion to 100* in   
standing degrees, to meet   
functional goals, to be   
achieved by 10/1/22. .                    
  
  
PT Impaired muscle   
performance and/or ROM                    
  
                                          
  
  
No                                        
   
                                                      
  
  
Improved Transfers  
  
  
Description:  
  
  
  
  
  
  
  
  
LTG: Patient will demonstrate   
safe transfers to/from bed,   
chair, toilet, shower/tub and   
car independently with AD, to   
be achieved by 10/1/22. .  
  
                                          
  
  
PT Impaired mobility                      
  
                                          
  
  
No                                        
   
                                                      
  
  
Improved Gait  
  
  
Description:  
  
  
  
  
  
LTG: Patient will demonstrate   
improved gait ability as   
evidenced by ambulation 150   
feet with rollator walker   
independently with AD, to   
return to safe household and   
community ambulation, in   
order to return to plf , to   
be achieved by 10/1/22. .  
  
  
  
  
                                          
  
  
PT Impaired gait                          
  
                                          
  
  
No                                        
   
                                                      
  
  
Improved Balance  
  
  
Description:  
  
  
  
  
  
LTG: Patient will demonstrate   
improved standing balance to   
meet functional goals as   
evidenced by TUG score of <28   
to be achieved by 10/1/22. .              
  
  
PT Impaired balance                       
  
                                          
  
  
No                                        
   
                                                      
  
  
Demonstrate understanding of   
education  
  
  
Description:  
  
  
Patient and/or caregiver will   
understand educational   
instruction to be achieved by   
10/1/22. .                                
  
  
PT Learning Assessment                    
  
                                          
  
  
No                                        
   
                                                      
  
  
Patient/caregiver will   
demonstrate ability to   
obtain, store, identify and   
administer ordered   
medications, keep accurate   
medication list in home, and   
adhere to medication schedule  
  
  
Description:  
  
  
Patient/caregiver will   
demonstrate ability to   
obtain, store, identify and   
administer ordered   
medications, keep accurate   
medication list in home, and   
adhere to medication schedule   
by 10/1/22.  
  
                                          
  
  
Medication Education                      
  
                                          
  
  
No                                        
   
                                                      
  
  
Patient/caregiver will be   
able to identify and report   
symptoms of sepsis  
  
  
Description:  
  
  
Patient/caregiver will be   
able to identify   
signs/symptoms of sepsis   
infection and will verbalize   
actions to take if suspected   
by 10/1/22. .                             
  
  
Sepsis                                    
  
                                          
  
  
No                                        
   
                                                      
  
  
Patient to maintain   
parameters within   
physician-specified ranges   
throughout certification   
period                                    
  
  
Physician Specific   
Parameters                                
  
                                          
  
  
No                                        
   
                                                      
  
  
Manage Risk for falls  
  
  
Description:  
  
  
Patient/caregiver will   
verbalize knowledge of   
individualized fall   
prevention strategies by   
10/1/22. .                                
  
  
Risk for Falls                            
  
                                          
  
  
No                                        
   
                                                      
  
  
Manage Pain  
  
  
Description:  
  
  
Patient/caregiver will   
verbalize knowledge and   
understanding of appropriate   
techniques to control pain,   
including pain medication and   
non-pharmacological   
techniques. Patient will   
verbalize or demonstrate an   
acceptable level of pain as   
evidenced by a pain score of   
<5/10 and improvement in   
ability to perform activities   
of daily living to be   
achieved by 10/1/22. .                    
  
  
Pain                                      
  
                                          
  
  
No                                        
   
                                                      
  
  
Manage discharge planning  
  
  
Description:  
  
  
Patient/caregiver will   
verbalize understanding of   
ongoing discharge plan   
provided related to disease   
management, arrangements for   
outpatient and/or community   
services, obtaining   
medications, supplies, and   
DME, as needed throughout   
certification period.                     
  
  
Discharge                                 
  
                                          
  
  
No                                        
  
  
                                  Interventions  
  
                                        Intervention        Associated   
Problem/Goal        Status              Variance            Visit Notes  
   
                                                      
  
  
Physical Therapy   
Therapeutic Exercises                   Problem:PT Impaired   
muscle performance   
and/or ROM  
Goal:Improved Muscle   
Performance and/or   
ROM                                       
  
  
Completed                                 
  
                                          
  
  
patient instructed on   
strengthening   
exercises including   
seated laq, hip   
flexion and   
adduction, heel toe   
raises x's 10 each   
with verbal and   
visual cues for   
correct form. patient   
instructed to perform   
home exercise program   
daily which included   
above exercises.  
   
                                                      
  
  
Physical Therapy   
Transfer Training                       Problem:PT Impaired   
mobility  
Goal:Improved   
Transfers                                 
  
  
Completed                                 
  
                                          
  
  
Transfer training and   
instruction to   
patient on safe   
transfers to and from   
chair and chair lift   
with supervision and   
verbal cues for   
safety.  
   
                                                      
  
  
Physical Therapy Gait   
Training                                Problem:PT Impaired   
gait  
Goal:Improved Gait                        
  
  
Completed                                 
  
                                          
  
  
Gait training and   
instruction to   
patient on safe   
ambulation with   
rollator walker for   
2x's125 feet with   
stand by assist, with   
verbal cues for   
corrections of gait   
deviations including   
inceased step height   
and for staying   
closer to rollator. .  
   
                                                      
  
  
Physical Therapy   
Balance Training                        Problem:PT Impaired   
balance  
Goal:Improved Balance                     
  
  
Completed                                 
  
                                          
  
  
Developed,   
implemented, and   
instructed patient on   
standing balance   
exercises including .  
   
                                                      
  
  
Instruct and educate   
on knowledge deficits                   Problem:PT Learning   
Assessment  
Goal:Demonstrate   
understanding of   
education                                 
  
  
Completed                                 
  
                                          
  
  
patient verbalize   
and/or demonstrate   
understanding of   
physical therapy   
education including   
home exercise   
program.  
  
  
  
  
  
Education methods   
include: verbal cues.  
  
  
  
  
  
Further education   
required to improve   
knowledge and   
compliance with home   
exercise program.  
   
                                                      
  
  
Medication Education  
  
  
Description:  
  
  
Evaluate/instruct   
patient/caregiver on   
obtaining, storing,   
identifying and   
administering ordered   
medications as well as   
keeping accurate   
medication list in the   
home and adhereing to   
medication schedule                     Problem:Medication   
Education  
Goal:Patient/caregive  
r will demonstrate   
ability to obtain,   
store, identify and   
administer ordered   
medications, keep   
accurate medication   
list in home, and   
adhere to medication   
schedule                                  
  
  
Completed                                 
  
                                          
  
  
Patient instructed on   
importance of keeping   
accurate medication   
list in home.  
   
                                                      
  
  
Risk of Sepsis  
  
  
Description:  
  
  
Patient is at risk for   
sepsis. Monitor   
closely for s/s of   
sepsis.                                 Problem:Sepsis  
Goal:Patient/caregive  
r will be able to   
identify and report   
symptoms of sepsis                        
  
  
Completed                                 
  
                                          
   
                                                      
  
  
SPO2  
  
  
Description:  
  
  
Notify Dr. Ahuja   
if pulse ox is <92% at   
rest.                                   Problem:Physician   
Specific Parameters  
Goal:Patient to   
maintain parameters   
within   
physician-specified   
ranges throughout   
certification period                      
  
  
Completed                                 
  
                                          
   
                                                      
  
  
Instruct on individual   
fall risk factors and   
strategies to prevent   
falls and injuries   
caused by falls.                        Problem:Risk for   
Falls  
Goal:Manage Risk for   
falls                                     
  
  
Completed                                 
  
                                          
  
  
PT: Patient   
instructed on   
Managing Impaired   
Functional Mobility:   
Use assistive   
device(s): front   
wheeled walker  
  
  
   
                                                      
  
  
Instruct on pain and   
instruct on strategies   
to control pain                         Problem:Pain  
Goal:Manage Pain                          
  
  
Completed                                 
  
                                          
  
  
patient instructed on   
techniques to control   
pain including   
Non-Pharmacological   
measures;   
breathing/relaxation.  
   
                                                      
  
  
Instruct on ongoing   
discharge plan                          Problem:Discharge  
Goal:Manage discharge   
planning                                  
  
  
Completed                                 
  
                                          
  
  
Ongoing Discharge   
plan: Discharge plan   
discussed with   
patient including   
frequency and   
duration for home PT   
and plan for   
transition to: live   
independently at home   
without ongoing   
services.  
  
  
  
documented in this encounter  
[facility]  
  
                                                    Visit Type -PT ROUTINE  
Discipline -Physical Therapy  
  
  
                                    Problems  
  
                    Problem   Description Start Date Status    Goals     Interve  
ntions  
   
                                                      
  
  
PT Impaired   
muscle   
performance   
and/or ROM  
  
  
Disciplines:  
  
  
PT                                                    
  
  
2022                                  
  
  
Active                                    
  
  
1 goal linked to   
scheduled/document  
ed intervention                           
  
  
1 goal   
intervention   
scheduled/document  
ed in this visit  
   
                                                      
  
  
PT Impaired   
mobility  
  
  
Disciplines:  
  
  
PT                                                    
  
  
2022                                  
  
  
Active                                    
  
  
1 goal linked to   
scheduled/document  
ed intervention                           
  
  
1 goal   
intervention   
scheduled/document  
ed in this visit  
   
                                                      
  
  
PT Impaired gait  
  
  
Disciplines:  
  
  
PT                                                    
  
  
2022                                  
  
  
Active                                    
  
  
1 goal linked to   
scheduled/document  
ed intervention                           
  
  
1 goal   
intervention   
scheduled/document  
ed in this visit  
   
                                                      
  
  
PT Impaired   
balance  
  
  
Disciplines:  
  
  
PT                                                    
  
  
2022                                  
  
  
Active                                    
  
  
1 goal linked to   
scheduled/document  
ed intervention                           
  
  
1 goal   
intervention   
scheduled/document  
ed in this visit  
   
                                                      
  
  
PT Learning   
Assessment  
  
  
Disciplines:  
  
  
PT                                                    
  
  
2022                                  
  
  
Active                                    
  
  
1 goal linked to   
scheduled/document  
ed intervention                           
  
  
1 goal   
intervention   
scheduled/document  
ed in this visit  
   
                                                      
  
  
Medication   
Education  
  
  
Disciplines:  
  
  
Skilled Services                                      
  
  
2022                                  
  
  
Active                                    
  
  
1 goal linked to   
scheduled/document  
ed intervention                           
  
  
1 goal   
intervention   
scheduled/document  
ed in this visit  
   
                                                      
  
  
Sepsis  
  
  
Disciplines:  
  
  
Skilled Services                                      
  
  
2022                                  
  
  
Active                                    
  
  
1 goal linked to   
scheduled/document  
ed intervention                           
  
  
1 goal   
intervention   
scheduled/document  
ed in this visit  
   
                                                      
  
  
Physician   
Specific   
Parameters  
  
  
Disciplines:  
  
  
Skilled Services                                      
  
  
2022                                  
  
  
Active                                    
  
  
1 goal linked to   
scheduled/document  
ed intervention                           
  
  
1 goal   
intervention   
scheduled/document  
ed in this visit  
   
                                                      
  
  
Risk for Falls  
  
  
Disciplines:  
  
  
Skilled Services                                      
  
  
2022                                  
  
  
Active                                    
  
  
1 goal linked to   
scheduled/document  
ed intervention                           
  
  
1 goal   
intervention   
scheduled/document  
ed in this visit  
  
  
                                      Goals  
  
                      Goal       Associated Problem Outcome    Goal Met?  Visit   
Notes  
   
                                                      
  
  
Improved Muscle Performance   
and/or ROM  
  
  
Description:  
  
  
LTG: Patient will demonstrate   
improved muscle performance   
to meet functional goals as   
evidenced by ability to   
tolerate 5 sit to stands in   
30 sec and tolerate 10 min of   
a standing activity, to be   
achieved by 10/1/22. .  
  
  
  
  
  
STG: Patient and/or caregiver   
will verbalize/demonstrate   
independence with home   
exercise program, to improve   
functional mobility, to be   
achieved by 9/10/22.  
  
  
  
  
  
LTG: Patient will demonstrate   
improved left hip active   
range of motion to 100* in   
standing degrees, to meet   
functional goals, to be   
achieved by 10/1/22. .                    
  
  
PT Impaired muscle   
performance and/or ROM                    
  
                                          
  
  
No                                        
   
                                                      
  
  
Improved Transfers  
  
  
Description:  
  
  
  
  
  
  
  
  
LTG: Patient will demonstrate   
safe transfers to/from bed,   
chair, toilet, shower/tub and   
car independently with AD, to   
be achieved by 10/1/22. .  
  
                                          
  
  
PT Impaired mobility                      
  
                                          
  
  
No                                        
   
                                                      
  
  
Improved Gait  
  
  
Description:  
  
  
  
  
  
LTG: Patient will demonstrate   
improved gait ability as   
evidenced by ambulation 150   
feet with rollator walker   
independently with AD, to   
return to safe household and   
community ambulation, in   
order to return to plf , to   
be achieved by 10/1/22. .  
  
  
  
  
                                          
  
  
PT Impaired gait                          
  
                                          
  
  
No                                        
   
                                                      
  
  
Improved Balance  
  
  
Description:  
  
  
  
  
  
LTG: Patient will demonstrate   
improved standing balance to   
meet functional goals as   
evidenced by TUG score of <28   
to be achieved by 10/1/22. .              
  
  
PT Impaired balance                       
  
                                          
  
  
No                                        
   
                                                      
  
  
Demonstrate understanding of   
education  
  
  
Description:  
  
  
Patient and/or caregiver will   
understand educational   
instruction to be achieved by   
10/1/22. .                                
  
  
PT Learning Assessment                    
  
                                          
  
  
No                                        
   
                                                      
  
  
Patient/caregiver will   
demonstrate ability to   
obtain, store, identify and   
administer ordered   
medications, keep accurate   
medication list in home, and   
adhere to medication schedule  
  
  
Description:  
  
  
Patient/caregiver will   
demonstrate ability to   
obtain, store, identify and   
administer ordered   
medications, keep accurate   
medication list in home, and   
adhere to medication schedule   
by 10/1/22.  
  
                                          
  
  
Medication Education                      
  
                                          
  
  
No                                        
   
                                                      
  
  
Patient/caregiver will be   
able to identify and report   
symptoms of sepsis  
  
  
Description:  
  
  
Patient/caregiver will be   
able to identify   
signs/symptoms of sepsis   
infection and will verbalize   
actions to take if suspected   
by 10/1/22. .                             
  
  
Sepsis                                    
  
                                          
  
  
No                                        
   
                                                      
  
  
Patient to maintain   
parameters within   
physician-specified ranges   
throughout certification   
period                                    
  
  
Physician Specific   
Parameters                                
  
                                          
  
  
No                                        
   
                                                      
  
  
Manage Risk for falls  
  
  
Description:  
  
  
Patient/caregiver will   
verbalize knowledge of   
individualized fall   
prevention strategies by   
10/1/22. .                                
  
  
Risk for Falls                            
  
                                          
  
  
No                                        
  
  
                                  Interventions  
  
                                        Intervention        Associated   
Problem/Goal        Status              Variance            Visit Notes  
   
                                                      
  
  
Physical Therapy   
Therapeutic Exercises                   Problem:PT Impaired   
muscle performance   
and/or ROM  
Goal:Improved Muscle   
Performance and/or   
ROM                                       
  
  
Completed                                 
  
                                          
  
  
patient instructed on   
strengthening   
exercises including   
Supine : GS,QS,HS,   
HIPADD, HIP BD, HEEL   
SLIDES, SAQ,BRIDGING   
X 15  
  
  
SEATED: FAQ, HIP   
FLEX, ,HIP ADD, HIP   
ABD AP X10 with blue   
T band  
  
  
Standing: heel raises   
( unable to do) , Toe   
raises, knee flexion,   
hip flexionX 10 reps  
  
  
with verbal and   
tactile cues for   
TECHNIQUE .  
  
  
  
  
  
noticeably stronger   
with the supine ther   
ex, seated ther ex   
difficult with t   
band, struglled   
greatly with standing   
ther ex, Unable to   
lift heels off floor   
with heel raises  
   
                                                      
  
  
Physical Therapy   
Transfer Training                       Problem:PT Impaired   
mobility  
Goal:Improved   
Transfers                                 
  
  
Completed                                 
  
                                          
  
  
Transfer training and   
instruction to   
patient on safe   
transfers to and from   
bed and chair with   
supervision and   
verbal cues for cues.  
   
                                                      
  
  
Physical Therapy Gait   
Training                                Problem:PT Impaired   
gait  
Goal:Improved Gait                        
  
  
Completed                                 
  
                                          
  
  
Gait training and   
instruction to   
patient on safe   
ambulation with front   
wheeled walker for   
50' x 2 feet with   
stand by assist, with   
tactile cues for   
corrections of gait   
deviations including   
picking up feet,   
cannot toe off due to   
calf weakness .  
   
                                                      
  
  
Physical Therapy   
Balance Training                        Problem:PT Impaired   
balance  
Goal:Improved Balance                     
  
  
Completed                                 
  
                                          
  
  
Developed,   
implemented, and   
instructed patient on   
standing balance   
exercises including   
standing ther ex.  
   
                                                      
  
  
Instruct and educate   
on knowledge deficits                   Problem:PT Learning   
Assessment  
Goal:Demonstrate   
understanding of   
education                                 
  
  
Completed                                 
  
                                          
  
  
patient verbalize   
and/or demonstrate   
understanding of   
physical therapy   
education including   
functional activity   
and home exercise   
program.  
  
  
  
  
  
Education methods   
include: verbal cues.  
  
  
  
  
  
Further education   
required to improve   
knowledge and   
compliance with fall   
prevention   
strategies, home   
safety, functional   
activity and home   
exercise program.  
   
                                                      
  
  
Medication Education  
  
  
Description:  
  
  
Evaluate/instruct   
patient/caregiver on   
obtaining, storing,   
identifying and   
administering ordered   
medications as well as   
keeping accurate   
medication list in the   
home and adhereing to   
medication schedule                     Problem:Medication   
Education  
Goal:Patient/caregive  
r will demonstrate   
ability to obtain,   
store, identify and   
administer ordered   
medications, keep   
accurate medication   
list in home, and   
adhere to medication   
schedule                                  
  
  
Completed                                 
  
                                          
  
  
Patient instructed on   
importance of keeping   
accurate medication   
list in home and   
adhering to   
medication schedule.  
   
                                                      
  
  
Risk of Sepsis  
  
  
Description:  
  
  
Patient is at risk for   
sepsis. Monitor   
closely for s/s of   
sepsis.                                 Problem:Sepsis  
Goal:Patient/caregive  
r will be able to   
identify and report   
symptoms of sepsis                        
  
  
Completed                                 
  
                                          
   
                                                      
  
  
SPO2  
  
  
Description:  
  
  
Notify Dr. Ahuja   
if pulse ox is <92% at   
rest.                                   Problem:Physician   
Specific Parameters  
Goal:Patient to   
maintain parameters   
within   
physician-specified   
ranges throughout   
certification period                      
  
  
Completed                                 
  
                                          
   
                                                      
  
  
Instruct on individual   
fall risk factors and   
strategies to prevent   
falls and injuries   
caused by falls.                        Problem:Risk for   
Falls  
Goal:Manage Risk for   
falls                                     
  
  
Completed                                 
  
                                          
  
  
PT: Patient   
instructed on  
  
  
Eliminating   
Environmental   
Hazards: Keep   
pathways clear, Keep   
pets out of pathways,   
Remove unsafe rugs,   
Move furniture from   
pathways and Wear   
supportive shoes or   
non-skid socks  
  
  
Managing Impaired   
Functional Mobility:   
Use assistive   
device(s): front   
wheeled walker  
  
  
Managing Pain  
  
  
  
  
  
  
documented in this encounter  
[facility]'s home Plan of care note* Visit Details  
  
                                                    Visit Type -PTA ROUTINE  
Discipline -Physical Therapy  
  
  
                                    Problems  
  
                    Problem   Description Start Date Status    Goals     Interve  
ntions  
   
                                                      
  
  
PT Impaired   
muscle   
performance   
and/or ROM  
  
  
Disciplines:  
  
  
PT                                                    
  
  
2022                                  
  
  
Active                                    
  
  
1 goal linked to   
scheduled/document  
ed intervention                           
  
  
1 goal   
intervention   
scheduled/document  
ed in this visit  
   
                                                      
  
  
PT Impaired gait  
  
  
Disciplines:  
  
  
PT                                                    
  
  
2022                                  
  
  
Active                                    
  
  
1 goal linked to   
scheduled/document  
ed intervention                           
  
  
1 goal   
intervention   
scheduled/document  
ed in this visit  
   
                                                      
  
  
PT Impaired   
balance  
  
  
Disciplines:  
  
  
PT                                                    
  
  
2022                                  
  
  
Active                                    
  
  
1 goal linked to   
scheduled/document  
ed intervention                           
  
  
1 goal   
intervention   
scheduled/document  
ed in this visit  
   
                                                      
  
  
PT Learning   
Assessment  
  
  
Disciplines:  
  
  
PT                                                    
  
  
2022                                  
  
  
Active                                    
  
  
1 goal linked to   
scheduled/document  
ed intervention                           
  
  
1 goal   
intervention   
scheduled/document  
ed in this visit  
   
                                                      
  
  
Medication   
Education  
  
  
Disciplines:  
  
  
Skilled Services                                      
  
  
2022                                  
  
  
Active                                    
  
  
1 goal linked to   
scheduled/document  
ed intervention                           
  
  
1 goal   
intervention   
scheduled/document  
ed in this visit  
   
                                                      
  
  
Sepsis  
  
  
Disciplines:  
  
  
Skilled Services                                      
  
  
2022                                  
  
  
Active                                    
  
  
1 goal linked to   
scheduled/document  
ed intervention                           
  
  
1 goal   
intervention   
scheduled/document  
ed in this visit  
   
                                                      
  
  
Physician   
Specific   
Parameters  
  
  
Disciplines:  
  
  
Skilled Services                                      
  
  
2022                                  
  
  
Active                                    
  
  
1 goal linked to   
scheduled/document  
ed intervention                           
  
  
1 goal   
intervention   
scheduled/document  
ed in this visit  
   
                                                      
  
  
Risk for Falls  
  
  
Disciplines:  
  
  
Skilled Services                                      
  
  
2022                                  
  
  
Active                                    
  
  
1 goal linked to   
scheduled/document  
ed intervention                           
  
  
1 goal   
intervention   
scheduled/document  
ed in this visit  
   
                                                      
  
  
Pain  
  
  
Disciplines:  
  
  
Skilled Services                                      
  
  
2022                                  
  
  
Active                                    
  
  
1 goal linked to   
scheduled/document  
ed intervention                           
  
  
1 goal   
intervention   
scheduled/document  
ed in this visit  
   
                                                      
  
  
Discharge  
  
  
Disciplines:  
  
  
Skilled Services                                      
  
  
2022                                  
  
  
Active                                    
  
  
1 goal linked to   
scheduled/document  
ed intervention                           
  
  
2 goal   
interventions   
scheduled/document  
ed in this visit  
  
  
                                      Goals  
  
                      Goal       Associated Problem Outcome    Goal Met?  Visit   
Notes  
   
                                                      
  
  
Improved Muscle Performance   
and/or ROM  
  
  
Description:  
  
  
LTG: Patient will demonstrate   
improved muscle performance   
to meet functional goals as   
evidenced by ability to   
tolerate 5 sit to stands in   
30 sec and tolerate 10 min of   
a standing activity, to be   
achieved by 10/1/22. .  
  
  
  
  
  
STG: Patient and/or caregiver   
will verbalize/demonstrate   
independence with home   
exercise program, to improve   
functional mobility, to be   
achieved by 9/10/22.  
  
  
  
  
  
LTG: Patient will demonstrate   
improved left hip active   
range of motion to 100* in   
standing degrees, to meet   
functional goals, to be   
achieved by 10/1/22. .                    
  
  
PT Impaired muscle   
performance and/or ROM                    
  
                                          
  
  
No                                        
   
                                                      
  
  
Improved Gait  
  
  
Description:  
  
  
  
  
  
LTG: Patient will demonstrate   
improved gait ability as   
evidenced by ambulation 150   
feet with rollator walker   
independently with AD, to   
return to safe household and   
community ambulation, in   
order to return to plf , to   
be achieved by 10/1/22. .  
  
  
  
  
                                          
  
  
PT Impaired gait                          
  
                                          
  
  
No                                        
   
                                                      
  
  
Improved Balance  
  
  
Description:  
  
  
  
  
  
LTG: Patient will demonstrate   
improved standing balance to   
meet functional goals as   
evidenced by TUG score of <28   
to be achieved by 10/1/22. .              
  
  
PT Impaired balance                       
  
                                          
  
  
No                                        
   
                                                      
  
  
Demonstrate understanding of   
education  
  
  
Description:  
  
  
Patient and/or caregiver will   
understand educational   
instruction to be achieved by   
10/1/22. .                                
  
  
PT Learning Assessment                    
  
                                          
  
  
No                                        
   
                                                      
  
  
Patient/caregiver will   
demonstrate ability to   
obtain, store, identify and   
administer ordered   
medications, keep accurate   
medication list in home, and   
adhere to medication schedule  
  
  
Description:  
  
  
Patient/caregiver will   
demonstrate ability to   
obtain, store, identify and   
administer ordered   
medications, keep accurate   
medication list in home, and   
adhere to medication schedule   
by 10/1/22.  
  
                                          
  
  
Medication Education                      
  
                                          
  
  
No                                        
   
                                                      
  
  
Patient/caregiver will be   
able to identify and report   
symptoms of sepsis  
  
  
Description:  
  
  
Patient/caregiver will be   
able to identify   
signs/symptoms of sepsis   
infection and will verbalize   
actions to take if suspected   
by 10/1/22. .                             
  
  
Sepsis                                    
  
                                          
  
  
No                                        
   
                                                      
  
  
Patient to maintain   
parameters within   
physician-specified ranges   
throughout certification   
period                                    
  
  
Physician Specific   
Parameters                                
  
                                          
  
  
No                                        
   
                                                      
  
  
Manage Risk for falls  
  
  
Description:  
  
  
Patient/caregiver will   
verbalize knowledge of   
individualized fall   
prevention strategies by   
10/1/22. .                                
  
  
Risk for Falls                            
  
                                          
  
  
No                                        
   
                                                      
  
  
Manage Pain  
  
  
Description:  
  
  
Patient/caregiver will   
verbalize knowledge and   
understanding of appropriate   
techniques to control pain,   
including pain medication and   
non-pharmacological   
techniques. Patient will   
verbalize or demonstrate an   
acceptable level of pain as   
evidenced by a pain score of   
<5/10 and improvement in   
ability to perform activities   
of daily living to be   
achieved by 10/1/22. .                    
  
  
Pain                                      
  
                                          
  
  
No                                        
   
                                                      
  
  
Manage discharge planning  
  
  
Description:  
  
  
Patient/caregiver will   
verbalize understanding of   
ongoing discharge plan   
provided related to disease   
management, arrangements for   
outpatient and/or community   
services, obtaining   
medications, supplies, and   
DME, as needed throughout   
certification period.                     
  
  
Discharge                                 
  
                                          
  
  
No                                        
  
  
                                  Interventions  
  
                                        Intervention        Associated   
Problem/Goal        Status              Variance            Visit Notes  
   
                                                      
  
  
Physical Therapy   
Therapeutic Exercises                   Problem:PT Impaired   
muscle performance   
and/or ROM  
Goal:Improved Muscle   
Performance and/or   
ROM                                       
  
  
Completed                                 
  
                                          
  
  
patient instructed on   
strengthening   
exercises including   
seated blue tband hip   
abd and flexion, laq   
and heel toe raises   
x's 1 each. standing   
toe raises, hip abd   
and flexion,   
hamstring curls x's   
15 each. with verbal   
and visual cues for   
correct form and   
pace. patient   
instructed to perform   
home exercise program   
twice a day which   
included above   
exercises .  
   
                                                      
  
  
Physical Therapy Gait   
Training                                Problem:PT Impaired   
gait  
Goal:Improved Gait                        
  
  
Completed                                 
  
                                          
  
  
Gait training and   
instruction to   
patient on safe   
ambulation with front   
wheeled walker for   
2x's50 feet with   
supervision, with   
verbal cues for   
corrections of gait   
deviations including   
posture and staying   
c;loser to ww.  
   
                                                      
  
  
Physical Therapy   
Balance Training                        Problem:PT Impaired   
balance  
Goal:Improved Balance                     
  
  
Completed                                 
  
                                          
  
  
Developed,   
implemented, and   
instructed patient on   
standing balance   
exercises including   
lateral stepping at   
counter 5feet x's 4   
w/ David UE support.   
step over w/ww   
3concesx's 2laps   
w/ww.  
   
                                                      
  
  
Instruct and educate   
on knowledge deficits                   Problem:PT Learning   
Assessment  
Goal:Demonstrate   
understanding of   
education                                 
  
  
Completed                                 
  
                                          
  
  
patient verbalize   
and/or demonstrate   
understanding of   
physical therapy   
education including   
home exercise   
program.  
  
  
  
  
  
Education methods   
include: verbal cues   
and visual cues.  
  
  
  
  
  
Further education   
required to improve   
knowledge and   
compliance with home   
exercise program.  
   
                                                      
  
  
Medication Education  
  
  
Description:  
  
  
Evaluate/instruct   
patient/caregiver on   
obtaining, storing,   
identifying and   
administering ordered   
medications as well as   
keeping accurate   
medication list in the   
home and adhereing to   
medication schedule                     Problem:Medication   
Education  
Goal:Patient/caregive  
r will demonstrate   
ability to obtain,   
store, identify and   
administer ordered   
medications, keep   
accurate medication   
list in home, and   
adhere to medication   
schedule                                  
  
  
Completed                                 
  
                                          
  
  
Patient instructed on   
importance of keeping   
accurate medication   
list in home.  
   
                                                      
  
  
Risk of Sepsis  
  
  
Description:  
  
  
Patient is at risk for   
sepsis. Monitor   
closely for s/s of   
sepsis.                                 Problem:Sepsis  
Goal:Patient/caregive  
r will be able to   
identify and report   
symptoms of sepsis                        
  
  
Completed                                 
  
                                          
   
                                                      
  
  
SPO2  
  
  
Description:  
  
  
Notify Dr. Ahuja   
if pulse ox is <92% at   
rest.                                   Problem:Physician   
Specific Parameters  
Goal:Patient to   
maintain parameters   
within   
physician-specified   
ranges throughout   
certification period                      
  
  
Completed                                 
  
                                          
   
                                                      
  
  
Instruct on individual   
fall risk factors and   
strategies to prevent   
falls and injuries   
caused by falls.                        Problem:Risk for   
Falls  
Goal:Manage Risk for   
falls                                     
  
  
Completed                                 
  
                                          
  
  
PT: Patient   
instructed on   
Managing Impaired   
Functional Mobility:   
Use assistive   
device(s): front   
wheeled walker  
  
  
   
                                                      
  
  
Instruct on pain and   
instruct on strategies   
to control pain                         Problem:Pain  
Goal:Manage Pain                          
  
  
Completed                                 
  
                                          
  
  
patient instructed on   
techniques to control   
pain including   
Non-Pharmacological   
measures; rest.  
   
                                                      
  
  
Deliver [facility]                           Problem:Discharge  
Goal:Manage discharge   
planning                                  
  
  
Completed                                 
  
                                          
  
  
Delivered [facility] on   
22 for discharge   
date of 22   
Patient denies   
questions/concerns w/   
form .  
   
                                                      
  
  
Instruct on ongoing   
discharge plan                          Problem:Discharge  
Goal:Manage discharge   
planning                                  
  
  
Completed                                 
  
                                          
  
  
Ongoing Discharge   
plan: Discharge plan   
discussed with   
patient including   
frequency and   
duration for home PT   
and plan for   
transition to: live   
independently at home   
without ongoing   
services.  
  
  
  
documented in this encounter  
[facility] Plan of care note* Visit Details  
  
                                                    Visit Type -PT REASSESSMENT  
Discipline -Physical Therapy  
  
  
                                    Problems  
  
                    Problem   Description Start Date Status    Goals     Interve  
ntions  
   
                                                      
  
  
PT Impaired   
muscle   
performance   
and/or ROM  
  
  
Disciplines:  
  
  
PT                                                    
  
  
2022                                  
  
  
Active                                    
  
  
1 goal linked to   
scheduled/document  
ed intervention                           
  
  
1 goal   
intervention   
scheduled/document  
ed in this visit  
   
                                                      
  
  
PT Impaired   
mobility  
  
  
Disciplines:  
  
  
PT                                                    
  
  
2022                                  
  
  
Active                                    
  
  
1 goal linked to   
scheduled/document  
ed intervention                           
  
  
1 goal   
intervention   
scheduled/document  
ed in this visit  
   
                                                      
  
  
PT Impaired gait  
  
  
Disciplines:  
  
  
PT                                                    
  
  
2022                                  
  
  
Active                                    
  
  
1 goal linked to   
scheduled/document  
ed intervention                           
  
  
1 goal   
intervention   
scheduled/document  
ed in this visit  
   
                                                      
  
  
PT Learning   
Assessment  
  
  
Disciplines:  
  
  
PT                                                    
  
  
2022                                  
  
  
Active                                    
  
  
1 goal linked to   
scheduled/document  
ed intervention                           
  
  
1 goal   
intervention   
scheduled/document  
ed in this visit  
   
                                                      
  
  
Medication   
Education  
  
  
Disciplines:  
  
  
Skilled Services                                      
  
  
2022                                  
  
  
Active                                    
  
  
1 goal linked to   
scheduled/document  
ed intervention                           
  
  
1 goal   
intervention   
scheduled/document  
ed in this visit  
   
                                                      
  
  
Sepsis  
  
  
Disciplines:  
  
  
Skilled Services                                      
  
  
2022                                  
  
  
Active                                    
  
  
1 goal linked to   
scheduled/document  
ed intervention                           
  
  
1 goal   
intervention   
scheduled/document  
ed in this visit  
   
                                                      
  
  
Physician   
Specific   
Parameters  
  
  
Disciplines:  
  
  
Skilled Services                                      
  
  
2022                                  
  
  
Active                                    
  
  
1 goal linked to   
scheduled/document  
ed intervention                           
  
  
1 goal   
intervention   
scheduled/document  
ed in this visit  
   
                                                      
  
  
Risk for Falls  
  
  
Disciplines:  
  
  
Skilled Services                                      
  
  
2022                                  
  
  
Active                                    
  
  
1 goal linked to   
scheduled/document  
ed intervention                           
  
  
1 goal   
intervention   
scheduled/document  
ed in this visit  
   
                                                      
  
  
Pain  
  
  
Disciplines:  
  
  
Skilled Services                                      
  
  
2022                                  
  
  
Active                                    
  
  
1 goal linked to   
scheduled/document  
ed intervention                           
  
  
1 goal   
intervention   
scheduled/document  
ed in this visit  
   
                                                      
  
  
Discharge  
  
  
Disciplines:  
  
  
Skilled Services                                      
  
  
2022                                  
  
  
Active                                    
  
  
1 goal linked to   
scheduled/document  
ed intervention                           
  
  
1 goal   
intervention   
scheduled/document  
ed in this visit  
  
  
                                      Goals  
  
                      Goal       Associated Problem Outcome    Goal Met?  Visit   
Notes  
   
                                                      
  
  
Improved Muscle Performance   
and/or ROM  
  
  
Description:  
  
  
LTG: Patient will demonstrate   
improved muscle performance   
to meet functional goals as   
evidenced by ability to   
tolerate 5 sit to stands in   
30 sec and tolerate 10 min of   
a standing activity, to be   
achieved by 10/1/22. .  
  
  
updated 22: to be   
achieved by 10/14/22  
  
  
  
  
  
STG: Patient and/or caregiver   
will verbalize/demonstrate   
independence with home   
exercise program, to improve   
functional mobility, to be   
achieved by 9/10/22.updated   
22: to be achieved by   
10/14/22  
  
  
  
  
  
  
  
  
LTG: Patient will demonstrate   
improved left hip active   
range of motion to 100* in   
standing degrees, to meet   
functional goals, to be   
achieved by 10/1/22.  
  
  
updated 22: to be   
achieved by 10/14/22                      
  
  
PT Impaired muscle   
performance and/or ROM                    
  
                                          
  
  
No                                        
   
                                                      
  
  
Improved Transfers  
  
  
Description:  
  
  
  
  
  
  
  
  
LTG: Patient will demonstrate   
safe transfers to/from bed,   
chair, toilet, shower/tub and   
car independently with AD, to   
be achieved by 10/1/22. .  
  
  
Updated 22: to be   
achieved by 10/14/22  
  
                                          
  
  
PT Impaired mobility                      
  
                                          
  
  
No                                        
   
                                                      
  
  
Improved Gait  
  
  
Description:  
  
  
  
  
  
LTG: Patient will demonstrate   
improved gait ability as   
evidenced by ambulation 150   
feet with rollator walker   
independently with AD, to   
return to safe household and   
community ambulation, in   
order to return to plf , to   
be achieved by 10/1/22. .  
  
  
update 22: ambulate for   
3 minutes with FWW mod indep   
indicating improved endurance   
with adequate foot clearance   
to be met 10/14/22  
  
                                          
  
  
PT Impaired gait                          
  
                                          
  
  
No                                        
   
                                                      
  
  
Demonstrate understanding of   
education  
  
  
Description:  
  
  
Patient and/or caregiver will   
understand educational   
instruction to be achieved by   
10/1/22. .  
  
  
updated 22: to be   
achieved by 10/14/22                      
  
  
PT Learning Assessment                    
  
                                          
  
  
No                                        
   
                                                      
  
  
Patient/caregiver will   
demonstrate ability to   
obtain, store, identify and   
administer ordered   
medications, keep accurate   
medication list in home, and   
adhere to medication schedule  
  
  
Description:  
  
  
Patient/caregiver will   
demonstrate ability to   
obtain, store, identify and   
administer ordered   
medications, keep accurate   
medication list in home, and   
adhere to medication schedule   
by 10/1/22.  
  
  
updated 22: to be   
achieved by 10/14/22                      
  
  
Medication Education                      
  
                                          
  
  
No                                        
   
                                                      
  
  
Patient/caregiver will be   
able to identify and report   
symptoms of sepsis  
  
  
Description:  
  
  
Patient/caregiver will be   
able to identify   
signs/symptoms of sepsis   
infection and will verbalize   
actions to take if suspected   
by 10/1/22.  
  
  
updated 22: to be   
achieved by 10/14/22.                     
  
  
Sepsis                                    
  
                                          
  
  
No                                        
   
                                                      
  
  
Patient to maintain   
parameters within   
physician-specified ranges   
throughout certification   
period                                    
  
  
Physician Specific   
Parameters                                
  
                                          
  
  
No                                        
   
                                                      
  
  
Manage Risk for falls  
  
  
Description:  
  
  
Patient/caregiver will   
verbalize knowledge of   
individualized fall   
prevention strategies by   
10/1/22.  
  
  
updated 22: to be   
achieved by 10/14/22                      
  
  
Risk for Falls                            
  
                                          
  
  
No                                        
   
                                                      
  
  
Manage Pain  
  
  
Description:  
  
  
Patient/caregiver will   
verbalize knowledge and   
understanding of appropriate   
techniques to control pain,   
including pain medication and   
non-pharmacological   
techniques. Patient will   
verbalize or demonstrate an   
acceptable level of pain as   
evidenced by a pain score of   
<5/10 and improvement in   
ability to perform activities   
of daily living to be   
achieved by 10/1/22.  
  
  
updated 22: to be   
achieved by 10/14/22                      
  
  
Pain                                      
  
                                          
  
  
No                                        
   
                                                      
  
  
Manage discharge planning  
  
  
Description:  
  
  
Patient/caregiver will   
verbalize understanding of   
ongoing discharge plan   
provided related to disease   
management, arrangements for   
outpatient and/or community   
services, obtaining   
medications, supplies, and   
DME, as needed throughout   
certification period.                     
  
  
Discharge                                 
  
                                          
  
  
No                                        
  
  
                                  Interventions  
  
                                        Intervention        Associated   
Problem/Goal        Status              Variance            Visit Notes  
   
                                                      
  
  
Physical Therapy   
Therapeutic Exercises                   Problem:PT Impaired   
muscle performance   
and/or ROM  
Goal:Improved Muscle   
Performance and/or   
ROM                                       
  
  
Completed                                 
  
                                          
  
  
patient instructed on   
strengthening   
exercises including   
ist to stands x 8 reps   
with verbal cues for   
full uprght position.   
patient instructed to   
perform home exercise   
program twice a day   
which included   
previous program.  
   
                                                      
  
  
Physical Therapy   
Transfer Training                       Problem:PT Impaired   
mobility  
Goal:Improved   
Transfers                                 
  
  
Completed                                 
  
                                          
  
  
Transfer training and   
instruction to patient   
on safe transfers to   
and from bed and chair   
with supervision and   
verbal and visual cues   
for correct hand   
placement.  
   
                                                      
  
  
Physical Therapy Gait   
Training                                Problem:PT Impaired   
gait  
Goal:Improved Gait                        
  
  
Completed                                 
  
                                          
  
  
Gait training and   
instruction to patient   
on safe ambulation   
with rollator walker   
for 3 mintues 20   
seconds, , with   
decrease in pace and   
increased shuffle   
after 1.5 minutes with   
supervision, with   
verbal cues for   
corrections of gait   
deviations including   
increasing heel strike   
and upright posture.  
   
                                                      
  
  
Instruct and educate   
on knowledge deficits                   Problem:PT Learning   
Assessment  
Goal:Demonstrate   
understanding of   
education                                 
  
  
Completed                                 
  
                                          
  
  
patient verbalize   
and/or demonstrate   
understanding of   
physical therapy   
education including   
pain management, fall   
prevention strategies,   
functional activity   
and home exercise   
program.  
  
  
  
  
  
Education methods   
include: verbal cues.  
  
  
  
  
  
Further education   
required to improve   
knowledge and   
compliance with pain   
management, fall   
prevention strategies,   
functional activity   
and home exercise   
program.  
   
                                                      
  
  
Medication Education  
  
  
Description:  
  
  
Evaluate/instruct   
patient/caregiver on   
obtaining, storing,   
identifying and   
administering ordered   
medications as well   
as keeping accurate   
medication list in   
the home and   
adhereing to   
medication schedule                     Problem:Medication   
Education  
Goal:Patient/caregive  
r will demonstrate   
ability to obtain,   
store, identify and   
administer ordered   
medications, keep   
accurate medication   
list in home, and   
adhere to medication   
schedule                                  
  
  
Completed                                 
  
                                          
  
  
Patient instructed on   
adhering to medication   
schedule.  
   
                                                      
  
  
Risk of Sepsis  
  
  
Description:  
  
  
Patient is at risk   
for sepsis. Monitor   
closely for s/s of   
sepsis.                                 Problem:Sepsis  
Goal:Patient/caregive  
r will be able to   
identify and report   
symptoms of sepsis                        
  
  
Completed                                 
  
                                          
   
                                                      
  
  
SPO2  
  
  
Description:  
  
  
Notify Dr. Ahuja   
if pulse ox is <92%   
at rest.                                Problem:Physician   
Specific Parameters  
Goal:Patient to   
maintain parameters   
within   
physician-specified   
ranges throughout   
certification period                      
  
  
Completed                                 
  
                                          
   
                                                      
  
  
Instruct on   
individual fall risk   
factors and   
strategies to prevent   
falls and injuries   
caused by falls.                        Problem:Risk for   
Falls  
Goal:Manage Risk for   
falls                                     
  
  
Completed                                 
  
                                          
  
  
PT: Patient instructed   
on  
  
  
Eliminating   
Environmental Hazards:   
Keep pathways clear,   
Keep rooms and   
walkways well lit,   
Wear supportive shoes   
or non-skid socks and   
Keep frequently used   
items within reach  
  
  
Managing Impaired   
Functional Mobility:   
Use assistive   
device(s): rollator   
walker  
  
  
Managing Pain  
  
  
  
  
  
   
                                                      
  
  
Instruct on pain and   
instruct on   
strategies to control   
pain                                    Problem:Pain  
Goal:Manage Pain                          
  
  
Completed                                 
  
                                          
  
  
patient instructed on   
techniques to control   
pain including   
Pharmacological   
measures and   
Non-Pharmacological   
measures; rest.  
   
                                                      
  
  
Instruct on ongoing   
discharge plan                          Problem:Discharge  
Goal:Manage discharge   
planning                                  
  
  
Completed                                 
  
                                          
  
  
Ongoing Discharge   
plan: Discharge plan   
discussed with patient   
including frequency   
and duration for home   
PT and plan for   
transition to: live   
independently at home   
without ongoing   
services.  
  
  
  
documented in this encounter  
[facility]  
  
                                                    Visit Type -PTA ROUTINE  
Discipline -Physical Therapy  
  
  
                                    Problems  
  
                    Problem   Description Start Date Status    Goals     Interve  
ntions  
   
                                                      
  
  
PT Impaired   
muscle   
performance   
and/or ROM  
  
  
Disciplines:  
  
  
PT                                                    
  
  
2022                                  
  
  
Active                                    
  
  
1 goal linked to   
scheduled/document  
ed intervention                           
  
  
1 goal   
intervention   
scheduled/document  
ed in this visit  
   
                                                      
  
  
PT Impaired   
mobility  
  
  
Disciplines:  
  
  
PT                                                    
  
  
2022                                  
  
  
Active                                    
  
  
1 goal linked to   
scheduled/document  
ed intervention                           
  
  
1 goal   
intervention   
scheduled/document  
ed in this visit  
   
                                                      
  
  
PT Impaired gait  
  
  
Disciplines:  
  
  
PT                                                    
  
  
2022                                  
  
  
Active                                    
  
  
1 goal linked to   
scheduled/document  
ed intervention                           
  
  
1 goal   
intervention   
scheduled/document  
ed in this visit  
   
                                                      
  
  
PT Impaired   
balance  
  
  
Disciplines:  
  
  
PT                                                    
  
  
2022                                  
  
  
Active                                    
  
  
1 goal linked to   
scheduled/document  
ed intervention                           
  
  
1 goal   
intervention   
scheduled/document  
ed in this visit  
   
                                                      
  
  
PT Learning   
Assessment  
  
  
Disciplines:  
  
  
PT                                                    
  
  
2022                                  
  
  
Active                                    
  
  
1 goal linked to   
scheduled/document  
ed intervention                           
  
  
1 goal   
intervention   
scheduled/document  
ed in this visit  
   
                                                      
  
  
Medication   
Education  
  
  
Disciplines:  
  
  
Skilled Services                                      
  
  
2022                                  
  
  
Active                                    
  
  
1 goal linked to   
scheduled/document  
ed intervention                           
  
  
1 goal   
intervention   
scheduled/document  
ed in this visit  
   
                                                      
  
  
Sepsis  
  
  
Disciplines:  
  
  
Skilled Services                                      
  
  
2022                                  
  
  
Active                                    
  
  
1 goal linked to   
scheduled/document  
ed intervention                           
  
  
1 goal   
intervention   
scheduled/document  
ed in this visit  
   
                                                      
  
  
Physician   
Specific   
Parameters  
  
  
Disciplines:  
  
  
Skilled Services                                      
  
  
2022                                  
  
  
Active                                    
  
  
1 goal linked to   
scheduled/document  
ed intervention                           
  
  
1 goal   
intervention   
scheduled/document  
ed in this visit  
   
                                                      
  
  
Risk for Falls  
  
  
Disciplines:  
  
  
Skilled Services                                      
  
  
2022                                  
  
  
Active                                    
  
  
1 goal linked to   
scheduled/document  
ed intervention                           
  
  
1 goal   
intervention   
scheduled/document  
ed in this visit  
   
                                                      
  
  
Pain  
  
  
Disciplines:  
  
  
Skilled Services                                      
  
  
2022                                  
  
  
Active                                    
  
  
1 goal linked to   
scheduled/document  
ed intervention                           
  
  
1 goal   
intervention   
scheduled/document  
ed in this visit  
   
                                                      
  
  
Discharge  
  
  
Disciplines:  
  
  
Skilled Services                                      
  
  
2022                                  
  
  
Active                                    
  
  
1 goal linked to   
scheduled/document  
ed intervention                           
  
  
1 goal   
intervention   
scheduled/document  
ed in this visit  
  
  
                                      Goals  
  
                      Goal       Associated Problem Outcome    Goal Met?  Visit   
Notes  
   
                                                      
  
  
Improved Muscle Performance   
and/or ROM  
  
  
Description:  
  
  
LTG: Patient will demonstrate   
improved muscle performance   
to meet functional goals as   
evidenced by ability to   
tolerate 5 sit to stands in   
30 sec and tolerate 10 min of   
a standing activity, to be   
achieved by 10/1/22. .  
  
  
updated 22: to be   
achieved by 10/14/22  
  
  
  
  
  
STG: Patient and/or caregiver   
will verbalize/demonstrate   
independence with home   
exercise program, to improve   
functional mobility, to be   
achieved by 9/10/22.updated   
22: to be achieved by   
10/14/22  
  
  
  
  
  
  
  
  
LTG: Patient will demonstrate   
improved left hip active   
range of motion to 100* in   
standing degrees, to meet   
functional goals, to be   
achieved by 10/1/22.  
  
  
updated 22: to be   
achieved by 10/14/22                      
  
  
PT Impaired muscle   
performance and/or ROM                    
  
                                          
  
  
No                                        
   
                                                      
  
  
Improved Transfers  
  
  
Description:  
  
  
  
  
  
  
  
  
LTG: Patient will demonstrate   
safe transfers to/from bed,   
chair, toilet, shower/tub and   
car independently with AD, to   
be achieved by 10/1/22. .  
  
  
Updated 22: to be   
achieved by 10/14/22  
  
                                          
  
  
PT Impaired mobility                      
  
                                          
  
  
No                                        
   
                                                      
  
  
Improved Gait  
  
  
Description:  
  
  
  
  
  
LTG: Patient will demonstrate   
improved gait ability as   
evidenced by ambulation 150   
feet with rollator walker   
independently with AD, to   
return to safe household and   
community ambulation, in   
order to return to plf , to   
be achieved by 10/1/22. .  
  
  
update 22: ambulate for   
3 minutes with FWW mod indep   
indicating improved endurance   
with adequate foot clearance   
to be met 10/14/22  
  
                                          
  
  
PT Impaired gait                          
  
                                          
  
  
No                                        
   
                                                      
  
  
Improved Balance  
  
  
Description:  
  
  
  
  
  
LTG: Patient will demonstrate   
improved standing balance to   
meet functional goals as   
evidenced by TUG score of <28   
to be achieved by 10/1/22. .  
  
  
MET 22                               
  
  
PT Impaired balance                       
  
                                          
  
  
No                                        
   
                                                      
  
  
Demonstrate understanding of   
education  
  
  
Description:  
  
  
Patient and/or caregiver will   
understand educational   
instruction to be achieved by   
10/1/22. .  
  
  
updated 22: to be   
achieved by 10/14/22                      
  
  
PT Learning Assessment                    
  
                                          
  
  
No                                        
   
                                                      
  
  
Patient/caregiver will   
demonstrate ability to   
obtain, store, identify and   
administer ordered   
medications, keep accurate   
medication list in home, and   
adhere to medication schedule  
  
  
Description:  
  
  
Patient/caregiver will   
demonstrate ability to   
obtain, store, identify and   
administer ordered   
medications, keep accurate   
medication list in home, and   
adhere to medication schedule   
by 10/1/22.  
  
  
updated 22: to be   
achieved by 10/14/22                      
  
  
Medication Education                      
  
                                          
  
  
No                                        
   
                                                      
  
  
Patient/caregiver will be   
able to identify and report   
symptoms of sepsis  
  
  
Description:  
  
  
Patient/caregiver will be   
able to identify   
signs/symptoms of sepsis   
infection and will verbalize   
actions to take if suspected   
by 10/1/22.  
  
  
updated 22: to be   
achieved by 10/14/22.                     
  
  
Sepsis                                    
  
                                          
  
  
No                                        
   
                                                      
  
  
Patient to maintain   
parameters within   
physician-specified ranges   
throughout certification   
period                                    
  
  
Physician Specific   
Parameters                                
  
                                          
  
  
No                                        
   
                                                      
  
  
Manage Risk for falls  
  
  
Description:  
  
  
Patient/caregiver will   
verbalize knowledge of   
individualized fall   
prevention strategies by   
10/1/22.  
  
  
updated 22: to be   
achieved by 10/14/22                      
  
  
Risk for Falls                            
  
                                          
  
  
No                                        
   
                                                      
  
  
Manage Pain  
  
  
Description:  
  
  
Patient/caregiver will   
verbalize knowledge and   
understanding of appropriate   
techniques to control pain,   
including pain medication and   
non-pharmacological   
techniques. Patient will   
verbalize or demonstrate an   
acceptable level of pain as   
evidenced by a pain score of   
<5/10 and improvement in   
ability to perform activities   
of daily living to be   
achieved by 10/1/22.  
  
  
updated 22: to be   
achieved by 10/14/22                      
  
  
Pain                                      
  
                                          
  
  
No                                        
   
                                                      
  
  
Manage discharge planning  
  
  
Description:  
  
  
Patient/caregiver will   
verbalize understanding of   
ongoing discharge plan   
provided related to disease   
management, arrangements for   
outpatient and/or community   
services, obtaining   
medications, supplies, and   
DME, as needed throughout   
certification period.                     
  
  
Discharge                                 
  
                                          
  
  
No                                        
  
  
                                  Interventions  
  
                                        Intervention        Associated   
Problem/Goal        Status              Variance            Visit Notes  
   
                                                      
  
  
Physical Therapy   
Therapeutic Exercises                   Problem:PT Impaired   
muscle performance   
and/or ROM  
Goal:Improved Muscle   
Performance and/or   
ROM                                       
  
  
Completed                                 
  
                                          
  
  
patient instructed on   
strengthening   
exercises including   
standing hip abd and   
flexion, hamstring   
curls x's 10 each.   
seated: laq, hip   
flexion and adduction   
and heel raises x's   
10 each. with verbal   
cues for correct pace   
. patient instructed   
to perform home   
exercise program   
twice a day which   
included above   
exercises .  
   
                                                      
  
  
Physical Therapy   
Transfer Training                       Problem:PT Impaired   
mobility  
Goal:Improved   
Transfers                                 
  
  
Completed                                 
  
                                          
  
  
Transfer training and   
instruction to   
patient on safe   
transfers to and from   
chair with   
supervision and   
verbal cues for   
reaching back for   
chair  
   
                                                      
  
  
Physical Therapy Gait   
Training                                Problem:PT Impaired   
gait  
Goal:Improved Gait                        
  
  
Completed                                 
  
                                          
  
  
Gait training and   
instruction to   
patient on safe   
ambulation with front   
wheeled walker for   
2x's 80 feet with   
stand by assist, with   
verbal cues for   
corrections of gait   
deviations including   
posture and staying   
closer to walker.  
   
                                                      
  
  
Physical Therapy   
Balance Training                        Problem:PT Impaired   
balance  
Goal:Improved Balance                     
  
  
Completed                                 
  
                                          
  
  
Developed,   
implemented, and   
instructed patient on   
standing balance   
exercises including   
lateral stepping at   
counter 5feet x's 4.  
   
                                                      
  
  
Instruct and educate   
on knowledge deficits                   Problem:PT Learning   
Assessment  
Goal:Demonstrate   
understanding of   
education                                 
  
  
Completed                                 
  
                                          
  
  
patient verbalize   
and/or demonstrate   
understanding of   
physical therapy   
education including   
home exercise   
program.  
  
  
  
  
  
Education methods   
include: verbal cues.  
  
  
  
  
  
Further education   
required to improve   
knowledge and   
compliance with home   
exercise program.  
   
                                                      
  
  
Medication Education  
  
  
Description:  
  
  
Evaluate/instruct   
patient/caregiver on   
obtaining, storing,   
identifying and   
administering ordered   
medications as well as   
keeping accurate   
medication list in the   
home and adhereing to   
medication schedule                     Problem:Medication   
Education  
Goal:Patient/caregive  
r will demonstrate   
ability to obtain,   
store, identify and   
administer ordered   
medications, keep   
accurate medication   
list in home, and   
adhere to medication   
schedule                                  
  
  
Completed                                 
  
                                          
  
  
Patient instructed on   
importance of keeping   
accurate medication   
list in home.  
   
                                                      
  
  
Risk of Sepsis  
  
  
Description:  
  
  
Patient is at risk for   
sepsis. Monitor   
closely for s/s of   
sepsis.                                 Problem:Sepsis  
Goal:Patient/caregive  
r will be able to   
identify and report   
symptoms of sepsis                        
  
  
Completed                                 
  
                                          
   
                                                      
  
  
SPO2  
  
  
Description:  
  
  
Notify Dr. Ahuja   
if pulse ox is <92% at   
rest.                                   Problem:Physician   
Specific Parameters  
Goal:Patient to   
maintain parameters   
within   
physician-specified   
ranges throughout   
certification period                      
  
  
Completed                                 
  
                                          
   
                                                      
  
  
Instruct on individual   
fall risk factors and   
strategies to prevent   
falls and injuries   
caused by falls.                        Problem:Risk for   
Falls  
Goal:Manage Risk for   
falls                                     
  
  
Completed                                 
  
                                          
  
  
PT: Patient   
instructed on   
Managing Impaired   
Functional Mobility:   
Use assistive   
device(s): front   
wheeled walker  
  
  
   
                                                      
  
  
Instruct on pain and   
instruct on strategies   
to control pain                         Problem:Pain  
Goal:Manage Pain                          
  
  
Completed                                 
  
                                          
  
  
patient instructed on   
techniques to control   
pain including   
Non-Pharmacological   
measures;   
positioning/elevation  
.  
   
                                                      
  
  
Instruct on ongoing   
discharge plan                          Problem:Discharge  
Goal:Manage discharge   
planning                                  
  
  
Completed                                 
  
                                          
  
  
Ongoing Discharge   
plan: Discharge plan   
discussed with   
patient including   
frequency and   
duration for home PT   
and plan for   
transition to: live   
independently at home   
without ongoing   
services.  
  
  
  
documented in this encounter  
[facility]'s home Plan of care note* Visit Details  
  
                                                    Visit Type -PTA ROUTINE  
Discipline -Physical Therapy  
  
  
                                    Problems  
  
                    Problem   Description Start Date Status    Goals     Interve  
ntions  
   
                                                      
  
  
PT Impaired   
muscle   
performance   
and/or ROM  
  
  
Disciplines:  
  
  
PT                                                    
  
  
2022                                  
  
  
Active                                    
  
  
1 goal linked to   
scheduled/document  
ed intervention                           
  
  
1 goal   
intervention   
scheduled/document  
ed in this visit  
   
                                                      
  
  
PT Impaired   
mobility  
  
  
Disciplines:  
  
  
PT                                                    
  
  
2022                                  
  
  
Active                                    
  
  
1 goal linked to   
scheduled/document  
ed intervention                           
  
  
1 goal   
intervention   
scheduled/document  
ed in this visit  
   
                                                      
  
  
PT Impaired gait  
  
  
Disciplines:  
  
  
PT                                                    
  
  
2022                                  
  
  
Active                                    
  
  
1 goal linked to   
scheduled/document  
ed intervention                           
  
  
1 goal   
intervention   
scheduled/document  
ed in this visit  
   
                                                      
  
  
PT Impaired   
balance  
  
  
Disciplines:  
  
  
PT                                                    
  
  
2022                                  
  
  
Active                                    
  
  
1 goal linked to   
scheduled/document  
ed intervention                           
  
  
1 goal   
intervention   
scheduled/document  
ed in this visit  
   
                                                      
  
  
PT Learning   
Assessment  
  
  
Disciplines:  
  
  
PT                                                    
  
  
2022                                  
  
  
Active                                    
  
  
1 goal linked to   
scheduled/document  
ed intervention                           
  
  
1 goal   
intervention   
scheduled/document  
ed in this visit  
   
                                                      
  
  
Medication   
Education  
  
  
Disciplines:  
  
  
Skilled Services                                      
  
  
2022                                  
  
  
Active                                    
  
  
1 goal linked to   
scheduled/document  
ed intervention                           
  
  
1 goal   
intervention   
scheduled/document  
ed in this visit  
   
                                                      
  
  
Sepsis  
  
  
Disciplines:  
  
  
Skilled Services                                      
  
  
2022                                  
  
  
Active                                    
  
  
1 goal linked to   
scheduled/document  
ed intervention                           
  
  
1 goal   
intervention   
scheduled/document  
ed in this visit  
   
                                                      
  
  
Physician   
Specific   
Parameters  
  
  
Disciplines:  
  
  
Skilled Services                                      
  
  
2022                                  
  
  
Active                                    
  
  
1 goal linked to   
scheduled/document  
ed intervention                           
  
  
1 goal   
intervention   
scheduled/document  
ed in this visit  
   
                                                      
  
  
Risk for Falls  
  
  
Disciplines:  
  
  
Skilled Services                                      
  
  
2022                                  
  
  
Active                                    
  
  
1 goal linked to   
scheduled/document  
ed intervention                           
  
  
1 goal   
intervention   
scheduled/document  
ed in this visit  
   
                                                      
  
  
Pain  
  
  
Disciplines:  
  
  
Skilled Services                                      
  
  
2022                                  
  
  
Active                                    
  
  
1 goal linked to   
scheduled/document  
ed intervention                           
  
  
1 goal   
intervention   
scheduled/document  
ed in this visit  
   
                                                      
  
  
Discharge  
  
  
Disciplines:  
  
  
Skilled Services                                      
  
  
2022                                  
  
  
Active                                    
  
  
1 goal linked to   
scheduled/document  
ed intervention                           
  
  
2 goal   
interventions   
scheduled/document  
ed in this visit  
  
  
                                      Goals  
  
                      Goal       Associated Problem Outcome    Goal Met?  Visit   
Notes  
   
                                                      
  
  
Improved Muscle Performance   
and/or ROM  
  
  
Description:  
  
  
LTG: Patient will demonstrate   
improved muscle performance   
to meet functional goals as   
evidenced by ability to   
tolerate 5 sit to stands in   
30 sec and tolerate 10 min of   
a standing activity, to be   
achieved by 10/1/22. .  
  
  
updated 22: to be   
achieved by 10/14/22  
  
  
  
  
  
STG: Patient and/or caregiver   
will verbalize/demonstrate   
independence with home   
exercise program, to improve   
functional mobility, to be   
achieved by 9/10/22.updated   
22: to be achieved by   
10/14/22  
  
  
  
  
  
  
  
  
LTG: Patient will demonstrate   
improved left hip active   
range of motion to 100* in   
standing degrees, to meet   
functional goals, to be   
achieved by 10/1/22.  
  
  
updated 22: to be   
achieved by 10/14/22                      
  
  
PT Impaired muscle   
performance and/or ROM                    
  
                                          
  
  
No                                        
   
                                                      
  
  
Improved Transfers  
  
  
Description:  
  
  
  
  
  
  
  
  
LTG: Patient will demonstrate   
safe transfers to/from bed,   
chair, toilet, shower/tub and   
car independently with AD, to   
be achieved by 10/1/22. .  
  
  
Updated 22: to be   
achieved by 10/14/22  
  
                                          
  
  
PT Impaired mobility                      
  
                                          
  
  
No                                        
   
                                                      
  
  
Improved Gait  
  
  
Description:  
  
  
  
  
  
LTG: Patient will demonstrate   
improved gait ability as   
evidenced by ambulation 150   
feet with rollator walker   
independently with AD, to   
return to safe household and   
community ambulation, in   
order to return to plf , to   
be achieved by 10/1/22. .  
  
  
update 22: ambulate for   
3 minutes with FWW mod indep   
indicating improved endurance   
with adequate foot clearance   
to be met 10/14/22  
  
                                          
  
  
PT Impaired gait                          
  
                                          
  
  
No                                        
   
                                                      
  
  
Improved Balance  
  
  
Description:  
  
  
  
  
  
LTG: Patient will demonstrate   
improved standing balance to   
meet functional goals as   
evidenced by TUG score of <28   
to be achieved by 10/1/22. .  
  
  
MET 22                               
  
  
PT Impaired balance                       
  
                                          
  
  
No                                        
   
                                                      
  
  
Demonstrate understanding of   
education  
  
  
Description:  
  
  
Patient and/or caregiver will   
understand educational   
instruction to be achieved by   
10/1/22. .  
  
  
updated 22: to be   
achieved by 10/14/22                      
  
  
PT Learning Assessment                    
  
                                          
  
  
No                                        
   
                                                      
  
  
Patient/caregiver will   
demonstrate ability to   
obtain, store, identify and   
administer ordered   
medications, keep accurate   
medication list in home, and   
adhere to medication schedule  
  
  
Description:  
  
  
Patient/caregiver will   
demonstrate ability to   
obtain, store, identify and   
administer ordered   
medications, keep accurate   
medication list in home, and   
adhere to medication schedule   
by 10/1/22.  
  
  
updated 22: to be   
achieved by 10/14/22                      
  
  
Medication Education                      
  
                                          
  
  
No                                        
   
                                                      
  
  
Patient/caregiver will be   
able to identify and report   
symptoms of sepsis  
  
  
Description:  
  
  
Patient/caregiver will be   
able to identify   
signs/symptoms of sepsis   
infection and will verbalize   
actions to take if suspected   
by 10/1/22.  
  
  
updated 22: to be   
achieved by 10/14/22.                     
  
  
Sepsis                                    
  
                                          
  
  
No                                        
   
                                                      
  
  
Patient to maintain   
parameters within   
physician-specified ranges   
throughout certification   
period                                    
  
  
Physician Specific   
Parameters                                
  
                                          
  
  
No                                        
   
                                                      
  
  
Manage Risk for falls  
  
  
Description:  
  
  
Patient/caregiver will   
verbalize knowledge of   
individualized fall   
prevention strategies by   
10/1/22.  
  
  
updated 22: to be   
achieved by 10/14/22                      
  
  
Risk for Falls                            
  
                                          
  
  
No                                        
   
                                                      
  
  
Manage Pain  
  
  
Description:  
  
  
Patient/caregiver will   
verbalize knowledge and   
understanding of appropriate   
techniques to control pain,   
including pain medication and   
non-pharmacological   
techniques. Patient will   
verbalize or demonstrate an   
acceptable level of pain as   
evidenced by a pain score of   
<5/10 and improvement in   
ability to perform activities   
of daily living to be   
achieved by 10/1/22.  
  
  
updated 22: to be   
achieved by 10/14/22                      
  
  
Pain                                      
  
                                          
  
  
No                                        
   
                                                      
  
  
Manage discharge planning  
  
  
Description:  
  
  
Patient/caregiver will   
verbalize understanding of   
ongoing discharge plan   
provided related to disease   
management, arrangements for   
outpatient and/or community   
services, obtaining   
medications, supplies, and   
DME, as needed throughout   
certification period.                     
  
  
Discharge                                 
  
                                          
  
  
No                                        
  
  
                                  Interventions  
  
                                        Intervention        Associated   
Problem/Goal        Status              Variance            Visit Notes  
   
                                                      
  
  
Physical Therapy   
Therapeutic Exercises                   Problem:PT Impaired   
muscle performance   
and/or ROM  
Goal:Improved Muscle   
Performance and/or   
ROM                                       
  
  
Completed                                 
  
                                          
  
  
patient instructed on   
strengthening   
exercises including   
seated: laq, hip   
flexion and adduction   
x's 15each. standing    
hip f;lexion and   
abduction, hamstring   
curls x's 10 each with   
verbal cues for   
posture and pacing .   
patient instructed to   
perform home exercise   
program twice a day   
which included above   
exercises .  
   
                                                      
  
  
Physical Therapy   
Transfer Training                       Problem:PT Impaired   
mobility  
Goal:Improved   
Transfers                                 
  
  
Completed                                 
  
                                          
  
  
Transfer training and   
instruction to patient   
on safe transfers to   
and from chair with   
supervision and verbal   
cues for reaching back   
for chair .  
   
                                                      
  
  
Physical Therapy Gait   
Training                                Problem:PT Impaired   
gait  
Goal:Improved Gait                        
  
  
Completed                                 
  
                                          
  
  
Gait training and   
instruction to patient   
on safe ambulation   
with rollator walker   
for 2x's 80 feet with   
supervision, with   
verbal cues for   
corrections of gait   
deviations including   
staying closer to   
walker for upright   
posture and safety.  
   
                                                      
  
  
Physical Therapy   
Balance Training                        Problem:PT Impaired   
balance  
Goal:Improved Balance                     
  
  
Completed                                 
  
                                          
  
  
Developed,   
implemented, and   
instructed patient on   
standing balance   
exercises including   
standing exercises .  
   
                                                      
  
  
Instruct and educate   
on knowledge deficits                   Problem:PT Learning   
Assessment  
Goal:Demonstrate   
understanding of   
education                                 
  
  
Completed                                 
  
                                          
  
  
patient verbalize   
and/or demonstrate   
understanding of   
physical therapy   
education including   
home exercise program.  
  
  
  
  
  
Education methods   
include: verbal cues   
and visual cues.  
  
  
  
  
  
Further education   
required to improve   
knowledge and   
compliance with home   
exercise program.  
   
                                                      
  
  
Medication Education  
  
  
Description:  
  
  
Evaluate/instruct   
patient/caregiver on   
obtaining, storing,   
identifying and   
administering ordered   
medications as well   
as keeping accurate   
medication list in   
the home and   
adhereing to   
medication schedule                     Problem:Medication   
Education  
Goal:Patient/caregive  
r will demonstrate   
ability to obtain,   
store, identify and   
administer ordered   
medications, keep   
accurate medication   
list in home, and   
adhere to medication   
schedule                                  
  
  
Completed                                 
  
                                          
  
  
Patient instructed on   
importance of keeping   
accurate medication   
list in home and   
adhering to medication   
schedule.  
   
                                                      
  
  
Risk of Sepsis  
  
  
Description:  
  
  
Patient is at risk   
for sepsis. Monitor   
closely for s/s of   
sepsis.                                 Problem:Sepsis  
Goal:Patient/caregive  
r will be able to   
identify and report   
symptoms of sepsis                        
  
  
Completed                                 
  
                                          
   
                                                      
  
  
SPO2  
  
  
Description:  
  
  
Notify Dr. Ahuja   
if pulse ox is <92%   
at rest.                                Problem:Physician   
Specific Parameters  
Goal:Patient to   
maintain parameters   
within   
physician-specified   
ranges throughout   
certification period                      
  
  
Completed                                 
  
                                          
   
                                                      
  
  
Instruct on   
individual fall risk   
factors and   
strategies to prevent   
falls and injuries   
caused by falls.                        Problem:Risk for   
Falls  
Goal:Manage Risk for   
falls                                     
  
  
Completed                                 
  
                                          
  
  
PT: Patient instructed   
on Managing Impaired   
Functional Mobility:   
Use assistive   
device(s): front   
wheeled walker and   
rollator walker  
  
  
   
                                                      
  
  
Instruct on pain and   
instruct on   
strategies to control   
pain                                    Problem:Pain  
Goal:Manage Pain                          
  
  
Completed                                 
  
                                          
  
  
patient instructed on   
techniques to control   
pain including   
Pharmacological   
measures and   
Non-Pharmacological   
measures; rest.  
   
                                                      
  
  
Deliver [facility]                           Problem:Discharge  
Goal:Manage discharge   
planning                                  
  
  
Completed                                 
  
                                          
  
  
Delivered [facility] on   
10/10/22 for discharge   
date of 10./12/22.   
Patient denies   
questions/concerns   
w/form  
   
                                                      
  
  
Instruct on ongoing   
discharge plan                          Problem:Discharge  
Goal:Manage discharge   
planning                                  
  
  
Completed                                 
  
                                          
  
  
Ongoing Discharge   
plan: Discharge plan   
discussed with patient   
including frequency   
and duration for home   
PT and plan for   
transition to:   
caregiver assistance.  
  
  
  
documented in this encounter  
[facility] Details  
  
                                                    Visit Type -PT [facility]  
Discipline -Physical Therapy  
  
  
                                    Problems  
  
                    Problem   Description Start Date Status    Goals     Interve  
ntions  
   
                                                      
  
  
PT Impaired   
muscle   
performance   
and/or ROM  
  
  
Disciplines:  
  
  
PT                                                    
  
  
2022                                  
  
  
Resolved on   
10/12/2022                                
  
  
1 goal linked to   
scheduled/documen  
[facility] intervention                          
  
  
1 goal   
intervention   
scheduled/document  
ed in this visit  
   
                                                      
  
  
PT Impaired   
mobility  
  
  
Disciplines:  
  
  
PT                                                    
  
  
2022                                  
  
  
Resolved on   
10/12/2022                                
  
  
1 goal linked to   
scheduled/documen  
[facility] intervention                          
  
  
1 goal   
intervention   
scheduled/document  
ed in this visit  
   
                                                      
  
  
PT Impaired gait  
  
  
Disciplines:  
  
  
PT                                                    
  
  
2022                                  
  
  
Resolved on   
10/12/2022                                
  
  
1 goal linked to   
scheduled/documen  
[facility] intervention                          
  
  
1 goal   
intervention   
scheduled/document  
ed in this visit  
   
                                                      
  
  
PT Impaired   
balance  
  
  
Disciplines:  
  
  
PT                                                    
  
  
2022                                  
  
  
Resolved on   
10/12/2022                                
  
  
1 goal linked to   
scheduled/documen  
[facility] intervention                          
  
  
1 goal   
intervention   
scheduled/document  
ed in this visit  
   
                                                      
  
  
PT Learning   
Assessment  
  
  
Disciplines:  
  
  
PT                                                    
  
  
2022                                  
  
  
Resolved on   
10/12/2022                                
  
  
1 goal linked to   
scheduled/documen  
[facility] intervention                          
  
  
1 goal   
intervention   
scheduled/document  
ed in this visit  
   
                                                      
  
  
Medication   
Education  
  
  
Disciplines:  
  
  
Skilled Services                                      
  
  
2022                                  
  
  
Resolved on   
10/12/2022                                
  
  
1 goal linked to   
scheduled/documen  
[facility] intervention                          
  
  
1 goal   
intervention   
scheduled/document  
ed in this visit  
   
                                                      
  
  
Sepsis  
  
  
Disciplines:  
  
  
Skilled Services                                      
  
  
2022                                  
  
  
Resolved on   
10/12/2022                                
  
  
1 goal linked to   
scheduled/documen  
[facility] intervention                          
  
  
1 goal   
intervention   
scheduled/document  
ed in this visit  
   
                                                      
  
  
Physician   
Specific   
Parameters  
  
  
Disciplines:  
  
  
Skilled Services                                      
  
  
2022                                  
  
  
Resolved on   
10/12/2022                                
  
  
1 goal linked to   
scheduled/documen  
[facility] intervention                          
  
  
1 goal   
intervention   
scheduled/document  
ed in this visit  
   
                                                      
  
  
Risk for Falls  
  
  
Disciplines:  
  
  
Skilled Services                                      
  
  
2022                                  
  
  
Resolved on   
10/12/2022                                
  
  
1 goal linked to   
scheduled/documen  
[facility] intervention                          
  
  
1 goal   
intervention   
scheduled/document  
ed in this visit  
   
                                                      
  
  
Pain  
  
  
Disciplines:  
  
  
Skilled Services                                      
  
  
2022                                  
  
  
Resolved on   
10/12/2022                                
  
  
1 goal linked to   
scheduled/documen  
[facility] intervention                          
  
  
1 goal   
intervention   
scheduled/document  
ed in this visit  
   
                                                      
  
  
Discharge  
  
  
Disciplines:  
  
  
Skilled Services                                      
  
  
2022                                  
  
  
Resolved on   
10/12/2022                                
  
  
1 goal linked to   
scheduled/documen  
[facility] intervention                          
  
  
1 goal   
intervention   
scheduled/document  
ed in this visit  
  
  
                                      Goals  
  
                      Goal       Associated Problem Outcome    Goal Met?  Visit   
Notes  
   
                                                      
  
  
Improved Muscle Performance   
and/or ROM  
  
  
Description:  
  
  
LTG: Patient will   
demonstrate improved muscle   
performance to meet   
functional goals as   
evidenced by ability to   
tolerate 5 sit to stands in   
30 sec and tolerate 10 min   
of a standing activity, to   
be achieved by 10/1/22. .  
  
  
updated 22: to be   
achieved by 10/14/22  
  
  
  
  
  
STG: Patient and/or   
caregiver will   
verbalize/demonstrate   
independence with home   
exercise program, to   
improve functional   
mobility, to be achieved by   
9/10/22.updated 22: to   
be achieved by 10/14/22  
  
  
  
  
  
  
  
  
LTG: Patient will   
demonstrate improved left   
hip active range of motion   
to 100* in standing   
degrees, to meet functional   
goals, to be achieved by   
10/1/22.  
  
  
updated 22: to be   
achieved by 10/14/22                      
  
  
PT Impaired muscle   
performance and/or ROM                    
  
  
Completed                                 
  
  
Yes                                       
   
                                                      
  
  
Improved Transfers  
  
  
Description:  
  
  
  
  
  
  
  
  
LTG: Patient will   
demonstrate safe transfers   
to/from bed, chair, toilet,   
shower/tub and car   
independently with AD, to   
be achieved by 10/1/22. .  
  
  
Updated 22: to be   
achieved by 10/14/22  
  
                                          
  
  
PT Impaired mobility                      
  
  
Completed                                 
  
  
Yes                                       
   
                                                      
  
  
Improved Gait  
  
  
Description:  
  
  
  
  
  
LTG: Patient will   
demonstrate improved gait   
ability as evidenced by   
ambulation 150 feet with   
rollator walker   
independently with AD, to   
return to safe household   
and community ambulation,   
in order to return to plf ,   
to be achieved by 10/1/22.   
.  
  
  
update 22: ambulate   
for 3 minutes with FWW mod   
indep indicating improved   
endurance with adequate   
foot clearance to be met   
10/14/22  
  
                                          
  
  
PT Impaired gait                          
  
  
Completed                                 
  
  
Yes                                       
   
                                                      
  
  
Improved Balance  
  
  
Description:  
  
  
  
  
  
LTG: Patient will   
demonstrate improved   
standing balance to meet   
functional goals as   
evidenced by TUG score of   
<28 to be achieved by   
10/1/22. .  
  
  
MET 22                               
  
  
PT Impaired balance                       
  
  
Completed                                 
  
  
Yes                                       
   
                                                      
  
  
Demonstrate understanding   
of education  
  
  
Description:  
  
  
Patient and/or caregiver   
will understand educational   
instruction to be achieved   
by 10/1/22. .  
  
  
updated 22: to be   
achieved by 10/14/22                      
  
  
PT Learning Assessment                    
  
  
Completed                                 
  
  
Yes                                       
   
                                                      
  
  
Patient/caregiver will   
demonstrate ability to   
obtain, store, identify and   
administer ordered   
medications, keep accurate   
medication list in home,   
and adhere to medication   
schedule  
  
  
Description:  
  
  
Patient/caregiver will   
demonstrate ability to   
obtain, store, identify and   
administer ordered   
medications, keep accurate   
medication list in home,   
and adhere to medication   
schedule by 10/1/22.  
  
  
updated 22: to be   
achieved by 10/14/22                      
  
  
Medication Education                      
  
  
Completed                                 
  
  
Yes                                       
   
                                                      
  
  
Patient/caregiver will be   
able to identify and report   
symptoms of sepsis  
  
  
Description:  
  
  
Patient/caregiver will be   
able to identify   
signs/symptoms of sepsis   
infection and will   
verbalize actions to take   
if suspected by 10/1/22.  
  
  
updated 22: to be   
achieved by 10/14/22.                     
  
  
Sepsis                                    
  
  
Completed                                 
  
  
Yes                                       
   
                                                      
  
  
Patient to maintain   
parameters within   
physician-specified ranges   
throughout certification   
period                                    
  
  
Physician Specific   
Parameters                                
  
  
Completed                                 
  
  
Yes                                       
   
                                                      
  
  
Manage Risk for falls  
  
  
Description:  
  
  
Patient/caregiver will   
verbalize knowledge of   
individualized fall   
prevention strategies by   
10/1/22.  
  
  
updated 22: to be   
achieved by 10/14/22                      
  
  
Risk for Falls                            
  
  
Completed                                 
  
  
Yes                                       
   
                                                      
  
  
Manage Pain  
  
  
Description:  
  
  
Patient/caregiver will   
verbalize knowledge and   
understanding of   
appropriate techniques to   
control pain, including   
pain medication and   
non-pharmacological   
techniques. Patient will   
verbalize or demonstrate an   
acceptable level of pain as   
evidenced by a pain score   
of <5/10 and improvement in   
ability to perform   
activities of daily living   
to be achieved by 10/1/22.  
  
  
updated 22: to be   
achieved by 10/14/22                      
  
  
Pain                                      
  
  
Completed                                 
  
  
Yes                                       
   
                                                      
  
  
Manage discharge planning  
  
  
Description:  
  
  
Patient/caregiver will   
verbalize understanding of   
ongoing discharge plan   
provided related to disease   
management, arrangements   
for outpatient and/or   
community services,   
obtaining medications,   
supplies, and DME, as   
needed throughout   
certification period.                     
  
  
Discharge                                 
  
  
Completed                                 
  
  
Yes                                       
  
  
                                  Interventions  
  
                                        Intervention        Associated   
Problem/Goal        Status              Variance            Visit Notes  
   
                                                      
  
  
Physical Therapy   
Therapeutic Exercises                   Problem:PT Impaired   
muscle performance   
and/or ROM  
Goal:Improved Muscle   
Performance and/or   
ROM                                       
  
  
Completed                                 
  
                                          
  
  
patient instructed on   
strengthening   
exercises including   
seated: laq, hip   
flexion and adduction   
x's 15each. standing    
hip f;lexion and   
abduction, hamstring   
curls x's 10 each with   
verbal cues for   
posture and pacing .   
patient instructed to   
perform home exercise   
program twice a day   
which included above   
exercises  
  
  
   
                                                      
  
  
Physical Therapy   
Transfer Training                       Problem:PT Impaired   
mobility  
Goal:Improved   
Transfers                                 
  
  
Completed                                 
  
                                          
  
  
Transfers are DARIUS   
with safe/proper   
technique  
   
                                                      
  
  
Physical Therapy Gait   
Training                                Problem:PT Impaired   
gait  
Goal:Improved Gait                        
  
  
Completed                                 
  
                                          
  
  
Indep amb with WW with   
steady recip pattern,   
pt must make an effort   
to  his toes ,   
has no toe off  
   
                                                      
  
  
Physical Therapy   
Balance Training                        Problem:PT Impaired   
balance  
Goal:Improved Balance                     
  
  
Completed                                 
  
                                          
  
  
pt demonstrates   
improved dynamic   
standing balance as   
evidenced by a tug of   
28  
   
                                                      
  
  
Instruct and educate   
on knowledge deficits                   Problem:PT Learning   
Assessment  
Goal:Demonstrate   
understanding of   
education                                 
  
  
Completed                                 
  
                                          
  
  
patient verbalize   
and/or demonstrate   
understanding of   
physical therapy   
education including   
pain management, fall   
prevention strategies,   
home safety,   
functional activity   
and home exercise   
program.  
  
  
  
  
  
Education methods   
include: verbal cues.  
  
  
  
  
  
   
                                                      
  
  
Medication Education  
  
  
Description:  
  
  
Evaluate/instruct   
patient/caregiver on   
obtaining, storing,   
identifying and   
administering ordered   
medications as well   
as keeping accurate   
medication list in   
the home and   
adhereing to   
medication schedule                     Problem:Medication   
Education  
Goal:Patient/caregive  
r will demonstrate   
ability to obtain,   
store, identify and   
administer ordered   
medications, keep   
accurate medication   
list in home, and   
adhere to medication   
schedule                                  
  
  
Completed                                 
  
                                          
  
  
Patient instructed on   
importance of keeping   
accurate medication   
list in home and   
adhering to medication   
schedule.  
   
                                                      
  
  
Risk of Sepsis  
  
  
Description:  
  
  
Patient is at risk   
for sepsis. Monitor   
closely for s/s of   
sepsis.                                 Problem:Sepsis  
Goal:Patient/caregive  
r will be able to   
identify and report   
symptoms of sepsis                        
  
  
Completed                                 
  
                                          
   
                                                      
  
  
SPO2  
  
  
Description:  
  
  
Notify Dr. Ahuja   
if pulse ox is <92%   
at rest.                                Problem:Physician   
Specific Parameters  
Goal:Patient to   
maintain parameters   
within   
physician-specified   
ranges throughout   
certification period                      
  
  
Completed                                 
  
                                          
   
                                                      
  
  
Instruct on   
individual fall risk   
factors and   
strategies to prevent   
falls and injuries   
caused by falls.                        Problem:Risk for   
Falls  
Goal:Manage Risk for   
falls                                     
  
  
Completed                                 
  
                                          
  
  
PT: Patient instructed   
on  
  
  
Eliminating   
Environmental Hazards:   
Keep pathways clear,   
Keep pets out of   
pathways and Remove   
unsafe rugs  
  
  
Managing Impaired   
Functional Mobility:   
Use assistive   
device(s): front   
wheeled walker  
  
  
Managing Pain  
  
  
  
  
  
   
                                                      
  
  
Instruct on pain and   
instruct on   
strategies to control   
pain                                    Problem:Pain  
Goal:Manage Pain                          
  
  
Completed                                 
  
                                          
  
  
patient instructed on   
techniques to control   
pain including   
Pharmacological   
measures and   
Non-Pharmacological   
measures; rest and   
positioning/elevation.  
   
                                                      
  
  
Instruct on final   
discharge plan and   
deliver discharge   
instructions                            Problem:Discharge  
Goal:Manage discharge   
planning                                  
  
  
Completed                                 
  
                                          
  
  
Delivered Discharge   
plan: Discharge plan   
discussed with patient  
  
  
for plan for   
transition to: live at   
home with community   
assistance  
  
  
  
documented in this encounter  
[facility] Diagnostic [facility] Only 
  (Routine) - Closed  
  
                          Specialty    Diagnoses / Procedures Referred By Contac  
t Referred To Contact  
   
                                        [facility]            
  
  
Diagnoses  
  
  
Monoclonal gammopathy  
  
  
  
Procedures  
  
  
XR BONE SURVEY ROUTINE  
  
  
RADIOLOGIC EXAMINATION   
OSSEOUS SURVEY COMPL                      
  
  
Tello Ortega DO  
  
  
[address] [place] [address]  
  
  
Phone: [phone]  
  
  
Fax: [phone]                         
  
  
[facility]  
  
  
  
                    Referral ID Status    Reason    Start Date Expiration Date V  
isits   
Requested                               Visits   
Authorized  
   
                                [id number]        Closed            
  
  
Auto-Generate  
d Referral      2022        1               1  
  
  
  
  
Electronically signed by Tello Ortega DO at 2022 3:40 PM EST  
  
  
* Diagnostic Procedure Only (Routine) - Authorized  
  
                          Specialty    Diagnoses / Procedures Referred By Contac  
t Referred To Contact  
   
                                        [facility]            
  
  
Diagnoses  
  
  
Monoclonal gammopathy  
  
  
Macrocytic anemia  
  
  
Thrombocytopenia (HCC)  
  
  
  
Procedures  
  
  
US ABD RT UPPER QUADRANT  
  
  
US ABDOMINAL REAL TIME   
W/IMAGE LIMITED                           
  
  
Tello Ortega, DO  
  
  
[address] [place] [address]  
  
  
Phone: [phone]  
  
  
Fax: [phone]                         
  
  
[facility]  
  
  
  
                          Referral ID  Status       Reason       Start   
Date                                    Expiration   
Date                                    Visits   
Requested                               Visits   
Authorized  
   
                                [id number]        Authorized        
  
  
Auto-Generat  
ed Referral     2022        1               1  
  
  
  
  
Electronically signed by Tello Ortega DO at 2022 3:29 PM EST  
  
  
[facility] for referral (narrative)* Outpatient Procedure (Routine) 
  - Authorized  
  
                          Specialty    Diagnoses / Procedures Referred By Contac  
t Referred To Contact  
   
                                                    [facility] DISEASE   
[place]                                 
  
  
Diagnoses  
  
  
[place]  
  
  
  
Procedures  
  
  
EGD - THERAPEUTIC, EUS,   
OR TUBE INTERVENTIONS  
  
  
EGD BAND LIGATION   
ESOPHGEAL/GASTRIC   
VARICES                                   
  
  
Juanito Esparza MD  
  
  
[address] [place] [address]  
  
  
Phone: [phone]  
  
  
Fax: [phone]                         
  
  
[place] [address]  
  
  
  
                          Referral ID  Status       Reason       Start   
Date                                    Expiration   
Date                                    Visits   
Requested                               Visits   
Authorized  
   
                                [id number]        Authorized        
  
  
Auto-Generat  
ed Referral                             10/19/202  
3                   10/19/2024          1                   1  
  
  
  
  
Electronically signed by Juanito Esparza MD at 10/19/2023 2:42 PM EDT  
  
  
[facility] for referral (narrative)* Diagnostic Procedure Only 
  (Routine) - Closed  
  
                          Specialty    Diagnoses / Procedures Referred By Contac  
t Referred To Contact  
   
                                        [facility]            
  
  
Diagnoses  
  
  
Monoclonal gammopathy  
  
  
Macrocytic anemia  
  
  
Thrombocytopenia (HCC)  
  
  
  
Procedures  
  
  
US ABD RT UPPER QUADRANT  
  
  
US ABDOMINAL REAL TIME   
W/IMAGE LIMITED                           
  
  
Tello Ortega DO  
  
  
[address] [place] [address]  
  
  
Phone: [phone]  
  
  
Fax: [phone]                         
  
  
[facility]  
  
  
[place] [address]  
  
  
  
                    Referral ID Status    Reason    Start Date Expiration Date V  
isits   
Requested                               Visits   
Authorized  
   
                                [id number]        Closed            
  
  
Auto-Generate  
d Referral      2022        1               1  
  
  
  
  
Electronically signed by Tello Ortega DO at 2022 8:45 AM EST  
  
  
[facility]  
  
Summary Purpose  
  
  
                                                      
  
  
  
Family History  
No Family History Records FoundNo Family History Records FoundNo Family History 
Records FoundNo Family History Records FoundNo Family History Records FoundNo 
Family History Records Found  
  
Advance Directives  
No Advanced Directives Records FoundDocuments on File  
  
                          Type         Date Recorded Patient Representative Expl  
anation  
   
                          Advance Directive(s) 2020 9:27 AM                
   
                          Advance Directive(s) 2020 5:47 PM                
   
                          Advance Directive(s) 2018 11:22 AM                
  
                                Documents on File  
  
                          Type         Date Recorded Patient Representative Expl  
anation  
   
                          Advance Directive(s) 2020 9:27 AM                
   
                          Advance Directive(s) 2020 5:47 PM                
   
                          Advance Directive(s) 2018 11:22 AM                
  
                           Latest Code Status on File  
  
                          Code Status  Date Activated Date Inactivated Comments  
   
                          Full Code    2022 7:21 PM                
  
                           Latest Code Status on File  
  
                          Code Status  Date Activated Date Inactivated Comments  
   
                          Full Code    2022 7:21 PM                
  
                           Latest Code Status on File  
  
                          Code Status  Date Activated Date Inactivated Comments  
   
                          Full Code    2022 7:21 PM 2023 2:18 AM   
  
                           Latest Code Status on File  
  
                          Code Status  Date Activated Date Inactivated Comments  
   
                          Full Code    2022 7:21 PM 2023 2:18 AM   
  
                           Latest Code Status on File  
  
                          Code Status  Date Activated Date Inactivated Comments  
   
                          Full Code    2022 7:21 PM 2023 2:18 AM   
  
                           Latest Code Status on File  
  
                          Code Status  Date Activated Date Inactivated Comments  
   
                          Full Code    2022 7:21 PM 2023 2:18 AM   
  
  
  
Reason for Referral  
  
  
                          Specialty    Diagnoses / Procedures Referred By Contac  
t Referred To Contact  
   
                                        Gastroenterology      
  
  
Diagnoses  
  
  
Adenomatous polyp of colon,   
unspecified part of colon  
  
  
  
Procedures  
  
  
CONSULT TO GASTROENTEROLOGY               
  
  
Júnior Ahuja MD  
  
  
[address] [place] [address]  
  
  
Phone: [phone]  
  
  
Fax: [phone]                         
  
  
  
  
  
                          Referral ID  Status       Reason       Start   
Date                                    Expiration   
Date                                    Visits   
Requested                               Visits   
Authorized  
   
                                        [id number]            Ref Not   
Required                                  
  
  
PCP Requested   
Referral        2022       1               1  
  
  
  
                          Specialty    Diagnoses / Procedures Referred By Contac  
t Referred To Contact  
   
                                                              
  
  
Diagnoses  
  
  
Peripheral polyneuropathy  
  
  
Frequent falls  
  
  
Contusion of hip,   
unspecified laterality,   
subsequent encounter  
  
  
Muscular weakness  
  
  
Abnormality of gait  
  
  
  
Procedures  
  
  
CONSULT TO [facility] AT HOME                            
  
  
Júnior Ahuja MD  
  
  
[address] [place] [address]  
  
  
Phone: [phone]  
  
  
Fax: [phone]                         
  
  
Home Care  
  
  
[address] [place] [address]  
  
  
Phone: [phone]  
  
  
  
                          Referral ID  Status       Reason       Start   
Date                                    Expiration   
Date                                    Visits   
Requested                               Visits   
Authorized  
   
                                [id number]        Authorized        
  
  
PCP Requested   
Referral        2022      1               1  
  
  
  
                          Specialty    Diagnoses / Procedures Referred By Contac  
t Referred To Contact  
   
                                                              
  
  
Diagnoses  
  
  
Frequent falls  
  
  
Peripheral polyneuropathy  
  
  
  
Procedures  
  
  
CONSULT TO NEUROLOGY                      
  
  
Júnior Ahuja MD  
  
  
[address] [place] [address]  
  
  
Phone: [phone]  
  
  
Fax: [phone]                         
  
  
  
  
  
                          Referral ID  Status       Reason       Start   
Date                                    Expiration   
Date                                    Visits   
Requested                               Visits   
Authorized  
   
                                        [id number]            Ref Not   
Required                                  
  
  
PCP Requested   
Referral        10/5/2022       10/3/2023       1               1  
  
  
  
                          Specialty    Diagnoses / Procedures Referred By Contac  
t Referred To Contact  
   
                                                    [facility]                               
  
  
Diagnoses  
  
  
Peripheral   
polyneuropathy  
  
  
Abnormality of gait  
  
  
  
Procedures  
  
  
CONSULT TO OCCUPATIONAL   
THER  
  
  
OCCUPATIONAL THERAPY   
EVAL HIGH COMPLEX 60   
MINS                                      
  
  
Júnior Ahuja MD  
  
  
[address] [place] [address]  
  
  
Phone: [phone]  
  
  
Fax: [phone]                         
  
  
[facility]   
[facility] [place] [address] [place] [address]  
  
  
  
                          Referral ID  Status       Reason       Start   
Date                                    Expiration   
Date                                    Visits   
Requested                               Visits   
Authorized  
   
                                [id number]        Authorized        
  
  
PCP Requested   
Referral  
  
  
Auto-Generate  
d Referral                              10/17/202  
2                   10/17/2023          99                  99  
  
  
  
                          Specialty    Diagnoses / Procedures Referred By Contac  
t Referred To Contact  
   
                                        [facility]            
  
  
Diagnoses  
  
  
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   
ascites, unspecified hepatic   
cirrhosis type (HCC)  
  
  
Liver mass  
  
  
Splenomegaly  
  
  
  
Procedures  
  
  
MRI LIVER WO/W IVCON  
  
  
MRI ABDOMEN W/O & W/CONTRAST   
MATERIAL                                  
  
  
Maurizio Cochran PA-C  
  
  
[address] [place] [address]  
  
  
Phone: [phone]  
  
  
Fax: [phone]                         
  
  
[facility]  
  
  
  
                          Referral ID  Status       Reason       Start   
Date                                    Expiration   
Date                                    Visits   
Requested                               Visits   
Authorized  
   
                                        [id number]            Pending   
Review                                    
  
  
Auto-Generat  
ed Referral     2023       1               1  
  
  
  
                    Referral ID Status    Reason    Start Date Expiration Date V  
isits   
Requested                               Visits   
Authorized  
   
                                [id number]        Closed            
  
  
Auto-Generate  
d Referral      2023       1               1  
  
  
  
                          Specialty    Diagnoses / Procedures Referred By Contac  
t Referred To Contact  
   
                                                              
  
  
Diagnoses  
  
  
Hepatocellular carcinoma   
(HCC)  
  
  
  
Procedures  
  
  
CT SIM PLANNING RADIATION   
ONCOLOGY  
  
  
THER RAD SIMULAJ-AIDED FIELD   
SETTING COMPLEX                           
  
  
Marin Fish MD  
  
  
[address] [place] [address]  
  
  
Phone: [phone]  
  
  
Fax: [phone]                         
  
  
  
  
  
                          Referral ID  Status       Reason       Start   
Date                                    Expiration   
Date                                    Visits   
Requested                               Visits   
Authorized  
   
                                        [id number]            Pending   
Review                                    
  
  
PCP Requested   
Referral        2023       1               1  
  
  
  
                          Specialty    Diagnoses / Procedures Referred By Contac  
t Referred To Contact  
   
                                        [facility]            
  
  
Diagnoses  
  
  
Liver disease  
  
  
  
Procedures  
  
  
MRI LIVER WO/W IVCON  
  
  
MRI ABDOMEN W/O &   
W/CONTRAST MATERIAL                       
  
  
Marin Fish MD  
  
  
[address] [place] [address]  
  
  
Phone: [phone]  
  
  
Fax: [phone]                         
  
  
[facility]  
  
  
[place] [address]  
  
  
  
                    Referral ID Status    Reason    Start Date Expiration Date V  
isits   
Requested                               Visits   
Authorized  
   
                                [id number]        Closed            
  
  
Auto-Generate  
d Referral      2023       1               1  
  
  
  
Medications Administered Section  
     Inactive Administered Medications - up to 3 most recent administrations  
  
                    Medication Order MAR Action Action Date Dose      Rate        
Site  
   
                                                      
  
  
iron sucrose 200 mg in   
NaCl 0.9% 100ml   
(VENOFER)  
  
  
200 mg, INTRAVENOUS, at   
400 mL/hr, Administer   
over 15 Minutes, ONCE, 1   
dose, On 23 at   
1500, Please conduct a   
30 minute post dose   
observation.                            New   
Bag/Syringe/Bottle                      2023 3:11 PM   
EST                 200 mg              400 mL/hr             
   
                                                               
  
     Inactive Administered Medications - up to 3 most recent administrations  
  
                    Medication Order MAR Action Action Date Dose      Rate        
Site  
   
                                                      
  
  
iron sucrose 200 mg in   
NaCl 0.9% 100ml   
(VENOFER)  
  
  
200 mg, INTRAVENOUS, at   
400 mL/hr, Administer   
over 15 Minutes, ONCE, 1   
dose, On 23 at   
1500, Please conduct a   
30 minute post dose   
observation.                            New   
Bag/Syringe/Bottle                      2023 3:10 PM   
EST                 200 mg              400 mL/hr             
   
                                                               
  
     Inactive Administered Medications - up to 3 most recent administrations  
  
                    Medication Order MAR Action Action Date Dose      Rate        
Site  
   
                                                      
  
  
iron sucrose 200 mg in   
NaCl 0.9% 100ml   
(VENOFER)  
  
  
200 mg, INTRAVENOUS, at   
400 mL/hr, Administer   
over 15 Minutes, ONCE, 1   
dose, On 23 at   
1530, Please conduct a   
30 minute post dose   
observation.                            New   
Bag/Syringe/Bottle                      2023 3:45 PM   
EST                 200 mg              400 mL/hr             
   
                                                               
  
     Inactive Administered Medications - up to 3 most recent administrations  
  
                    Medication Order MAR Action Action Date Dose      Rate        
Site  
   
                                                      
  
  
iron sucrose 200 mg in   
NaCl 0.9% 100ml   
(VENOFER)  
  
  
200 mg, INTRAVENOUS, at   
400 mL/hr, Administer   
over 15 Minutes, ONCE, 1   
dose, On 23 at   
1530, Please conduct a   
30 minute post dose   
observation.                            New   
Bag/Syringe/Bottle                      2023 3:14 PM   
EST                 200 mg              400 mL/hr             
   
                                                               
  
     Inactive Administered Medications - up to 3 most recent administrations  
  
                    Medication Order MAR Action Action Date Dose      Rate        
Site  
   
                                                      
  
  
iron sucrose 200 mg in   
NaCl 0.9% 100ml   
(VENOFER)  
  
  
200 mg, INTRAVENOUS, at   
400 mL/hr, Administer   
over 15 Minutes, ONCE, 1   
dose, On 23 at   
1530, Please conduct a   
30 minute post dose   
observation.                            New   
Bag/Syringe/Bottle                      2023 3:14 PM   
EST                 200 mg              400 mL/hr             
   
                                                               
  
     Inactive Administered Medications - up to 3 most recent administrations  
  
                    Medication Order MAR Action Action Date Dose      Rate        
Site  
   
                                                      
  
  
iron sucrose 200 mg in   
NaCl 0.9% 100ml   
(VENOFER)  
  
  
200 mg, INTRAVENOUS, at   
400 mL/hr, Administer   
over 15 Minutes, ONCE, 1   
dose, On Fri 2/10/23 at   
1500, Please conduct a   
30 minute post dose   
observation.                            New   
Bag/Syringe/Bottle                      02/10/2023 2:46 PM   
EST                 200 mg              400 mL/hr             
   
                                                               
  
     Inactive Administered Medications - up to 3 most recent administrations  
  
                    Medication Order MAR Action Action Date Dose      Rate        
Site  
   
                                                      
  
  
iron sucrose 200 mg in   
NaCl 0.9% 100ml   
(VENOFER)  
  
  
200 mg, INTRAVENOUS, at   
400 mL/hr, Administer   
over 15 Minutes, ONCE, 1   
dose, On 23 at   
1600, Please conduct a   
30 minute post dose   
observation.                            New   
Bag/Syringe/Bottle                      2023 3:50 PM   
EST                 200 mg              400 mL/hr             
   
                                                               
  
     Inactive Administered Medications - up to 3 most recent administrations  
  
                    Medication Order MAR Action Action Date Dose      Rate        
Site  
   
                                                      
  
  
iron sucrose 200 mg in   
NaCl 0.9% 100ml   
(VENOFER)  
  
  
200 mg, INTRAVENOUS, at   
400 mL/hr, Administer   
over 15 Minutes, ONCE, 1   
dose, On Wed 2/15/23 at   
1530, Please conduct a   
30 minute post dose   
observation.                            New   
Bag/Syringe/Bottle                      02/15/2023 3:24 PM   
EST                 200 mg              400 mL/hr             
   
                                                               
  
     Inactive Administered Medications - up to 3 most recent administrations  
  
                    Medication Order MAR Action Action Date Dose      Rate        
Site  
   
                                                      
  
  
iron sucrose 200 mg in   
NaCl 0.9% 100ml   
(VENOFER)  
  
  
200 mg, INTRAVENOUS, at   
400 mL/hr, Administer   
over 15 Minutes, ONCE, 1   
dose, On 23 at   
1000, Please conduct a   
30 minute post dose   
observation.                            New   
Bag/Syringe/Bottle                      2023 9:44 AM   
EDT                 200 mg              400 mL/hr             
   
                                                               
  
     Inactive Administered Medications - up to 3 most recent administrations  
  
                    Medication Order MAR Action Action Date Dose      Rate        
Site  
   
                                                      
  
  
iron sucrose 200 mg in   
NaCl 0.9% 100ml   
(VENOFER)  
  
  
200 mg, INTRAVENOUS, at   
400 mL/hr, Administer   
over 15 Minutes, ONCE, 1   
dose, On 23 at   
1000, Please conduct a   
30 minute post dose   
observation.                            New   
Bag/Syringe/Bottle                      2023 10:10 AM   
EDT                 200 mg              400 mL/hr             
   
                                                               
  
     Inactive Administered Medications - up to 3 most recent administrations  
  
                    Medication Order MAR Action Action Date Dose      Rate        
Site  
   
                                                      
  
  
iron sucrose 200 mg in   
NaCl 0.9% 100ml   
(VENOFER)  
  
  
200 mg, INTRAVENOUS, at   
400 mL/hr, Administer   
over 15 Minutes, ONCE, 1   
dose, On 23 at   
1000, Please conduct a   
30 minute post dose   
observation.                            New   
Bag/Syringe/Bottle                      2023 10:06 AM   
EDT                 200 mg              400 mL/hr             
   
                                                               
  
     Inactive Administered Medications - up to 3 most recent administrations  
  
                    Medication Order MAR Action Action Date Dose      Rate        
Site  
   
                                                      
  
  
iron sucrose 200 mg in   
NaCl 0.9% 100ml   
(VENOFER)  
  
  
200 mg, INTRAVENOUS, at   
400 mL/hr, Administer   
over 15 Minutes, ONCE, 1   
dose, On 23 at   
1200, Please conduct a   
30 minute post dose   
observation.                            New   
Bag/Syringe/Bottle                      2023 11:55 AM   
EDT                 200 mg              400 mL/hr             
   
                                                               
  
     Inactive Administered Medications - up to 3 most recent administrations  
  
                    Medication Order MAR Action Action Date Dose      Rate        
Site  
   
                                                      
  
  
iron sucrose 200 mg in   
NaCl 0.9% 100ml   
(VENOFER)  
  
  
200 mg, INTRAVENOUS, at   
400 mL/hr, Administer   
over 15 Minutes, ONCE, 1   
dose, On 23 at   
0800, Please conduct a   
30 minute post dose   
observation.                            New   
Bag/Syringe/Bottle                      2023 9:55 AM   
EDT                 200 mg              400 mL/hr             
   
                                                               
  
  
  
Additional Source Comments  
  
  
  
                                                    PREGNANCY (unrecognized sect  
ion and content)  
   
                                                    No Pregnancy Status Records   
FoundNo Pregnancy Status Records FoundNo Pregnancy   
Status   
Records FoundNo Pregnancy Status Records FoundNo Pregnancy Status Records 
FoundNo   
Pregnancy Status Records Found  
  
  
  
  
                                                    INFORMATION SOURCE (unrecogn  
ized section and content)  
   
                                          
  
  
  
                                          
   
                                          
  
  
  
                                DATE CREATED    AUTHOR          AUTHOR'S ORGANIZ  
ATION  
   
                                2018                      [facility]  
  
  
  
                                DATE CREATED    AUTHOR          AUTHOR'S ORGANIZ  
ATION  
   
                                2018                      [facility]  
  
  
  
                                DATE CREATED    AUTHOR          AUTHOR'S ORGANIZ  
ATION  
   
                                2023                      [facility]  
tem [facility]  
  
  
  
                                DATE CREATED    AUTHOR          AUTHOR'S ORGANIZ  
ATION  
   
                                2023                      [facility]  
nter  
  
  
  
                                DATE CREATED    AUTHOR          AUTHOR'S ORGANIZ  
ATION  
   
                                2023                      [facility]  
  
  
  
  
  
                                                    Source Comments (unrecognize  
d section and content)  
   
                                                    In the event this informatio  
n is protected by the Federal Confidentiality of   
Alcohol   
and Drug Abuse Patient Records regulations: This information has been disclosed 
to   
you from records protected by Federal confidentiality rules (42 CFR Part 2). The
   
Federal rules prohibit you from making any further disclosure of this 
information   
unless further disclosure is expressly permitted by the written consent of the 
person   
to whom it pertains or as otherwise permitted by 42 CFR Part 2. A general   
authorization for the release of medical or other information is NOT sufficient 
for   
this purpose. The Federal rules restrict any use of the information to 
criminally   
investigate or prosecute any alcohol or drug abuse patient.[facility]In 
the   
event this information is protected by the Federal Confidentiality of Alcohol 
and   
Drug Abuse Patient Records regulations: This information has been disclosed to 
you   
from records protected by Federal confidentiality rules (42 CFR Part 2). The 
Federal   
rules prohibit you from making any further disclosure of this information unless
   
further disclosure is expressly permitted by the written consent of the person 
to   
whom it pertains or as otherwise permitted by 42 CFR Part 2. A general 
authorization   
for the release of medical or other information is NOT sufficient for this 
purpose.   
The Federal rules restrict any use of the information to criminally investigate 
or   
prosecute any alcohol or drug abuse patient.[facility] CampusIn the event this   
information is protected by the Federal Confidentiality of Alcohol and Drug 
Abuse   
Patient Records regulations: This information has been disclosed to you from 
records   
protected by Federal confidentiality rules (42 CFR Part 2). The Federal rules   
prohibit you from making any further disclosure of this information unless 
further   
disclosure is expressly permitted by the written consent of the person to whom 
it   
pertains or as otherwise permitted by 42 CFR Part 2. A general authorization for
 the   
release of medical or other information is NOT sufficient for this purpose. The   
Federal rules restrict any use of the information to criminally investigate or   
prosecute any alcohol or drug abuse patient.[facility] CampusIn the event this   
information is protected by the Federal Confidentiality of Alcohol and Drug 
Abuse   
Patient Records regulations: This information has been disclosed to you from 
records   
protected by Federal confidentiality rules (42 CFR Part 2). The Federal rules   
prohibit you from making any further disclosure of this information unless 
further   
disclosure is expressly permitted by the written consent of the person to whom 
it   
pertains or as otherwise permitted by 42 CFR Part 2. A general authorization for
 the   
release of medical or other information is NOT sufficient for this purpose. The   
Federal rules restrict any use of the information to criminally investigate or   
prosecute any alcohol or drug abuse patient.[facility] CampusIn the event this   
information is protected by the Federal Confidentiality of Alcohol and Drug 
Abuse   
Patient Records regulations: This information has been disclosed to you from 
records   
protected by Federal confidentiality rules (42 CFR Part 2). The Federal rules   
prohibit you from making any further disclosure of this information unless 
further   
disclosure is expressly permitted by the written consent of the person to whom 
it   
pertains or as otherwise permitted by 42 CFR Part 2. A general authorization for
 the   
release of medical or other information is NOT sufficient for this purpose. The   
Federal rules restrict any use of the information to criminally investigate or   
prosecute any alcohol or drug abuse patient.[facility] CampusIn the event this   
information is protected by the Federal Confidentiality of Alcohol and Drug 
Abuse   
Patient Records regulations: This information has been disclosed to you from 
records   
protected by Federal confidentiality rules (42 CFR Part 2). The Federal rules   
prohibit you from making any further disclosure of this information unless 
further   
disclosure is expressly permitted by the written consent of the person to whom 
it   
pertains or as otherwise permitted by 42 CFR Part 2. A general authorization for
 the   
release of medical or other information is NOT sufficient for this purpose. The   
Federal rules restrict any use of the information to criminally investigate or   
prosecute any alcohol or drug abuse patient.[facility] CampusIn the event this   
information is protected by the Federal Confidentiality of Alcohol and Drug 
Abuse   
Patient Records regulations: This information has been disclosed to you from 
records   
protected by Federal confidentiality rules (42 CFR Part 2). The Federal rules   
prohibit you from making any further disclosure of this information unless 
further   
disclosure is expressly permitted by the written consent of the person to whom 
it   
pertains or as otherwise permitted by 42 CFR Part 2. A general authorization for
 the   
release of medical or other information is NOT sufficient for this purpose. The   
Federal rules restrict any use of the information to criminally investigate or   
prosecute any alcohol or drug abuse patient.[facility] CampusIn the event this   
information is protected by the Federal Confidentiality of Alcohol and Drug 
Abuse   
Patient Records regulations: This information has been disclosed to you from 
records   
protected by Federal confidentiality rules (42 CFR Part 2). The Federal rules   
prohibit you from making any further disclosure of this information unless 
further   
disclosure is expressly permitted by the written consent of the person to whom 
it   
pertains or as otherwise permitted by 42 CFR Part 2. A general authorization for
 the   
release of medical or other information is NOT sufficient for this purpose. The   
Federal rules restrict any use of the information to criminally investigate or   
prosecute any alcohol or drug abuse patient.[facility] CampusIn the event this   
information is protected by the Federal Confidentiality of Alcohol and Drug 
Abuse   
Patient Records regulations: This information has been disclosed to you from 
records   
protected by Federal confidentiality rules (42 CFR Part 2). The Federal rules   
prohibit you from making any further disclosure of this information unless 
further   
disclosure is expressly permitted by the written consent of the person to whom 
it   
pertains or as otherwise permitted by 42 CFR Part 2. A general authorization for
 the   
release of medical or other information is NOT sufficient for this purpose. The   
Federal rules restrict any use of the information to criminally investigate or   
prosecute any alcohol or drug abuse patient.[facility] CampusIn the event this   
information is protected by the Federal Confidentiality of Alcohol and Drug 
Abuse   
Patient Records regulations: This information has been disclosed to you from 
records   
protected by Federal confidentiality rules (42 CFR Part 2). The Federal rules   
prohibit you from making any further disclosure of this information unless 
further   
disclosure is expressly permitted by the written consent of the person to whom 
it   
pertains or as otherwise permitted by 42 CFR Part 2. A general authorization for
 the   
release of medical or other information is NOT sufficient for this purpose. The   
Federal rules restrict any use of the information to criminally investigate or   
prosecute any alcohol or drug abuse patient.[facility] CampusIn the event this   
information is protected by the Federal Confidentiality of Alcohol and Drug 
Abuse   
Patient Records regulations: This information has been disclosed to you from 
records   
protected by Federal confidentiality rules (42 CFR Part 2). The Federal rules   
prohibit you from making any further disclosure of this information unless 
further   
disclosure is expressly permitted by the written consent of the person to whom 
it   
pertains or as otherwise permitted by 42 CFR Part 2. A general authorization for
 the   
release of medical or other information is NOT sufficient for this purpose. The   
Federal rules restrict any use of the information to criminally investigate or   
prosecute any alcohol or drug abuse patient.[facility] CampusIn the event this   
information is protected by the Federal Confidentiality of Alcohol and Drug 
Abuse   
Patient Records regulations: This information has been disclosed to you from 
records   
protected by Federal confidentiality rules (42 CFR Part 2). The Federal rules   
prohibit you from making any further disclosure of this information unless 
further   
disclosure is expressly permitted by the written consent of the person to whom 
it   
pertains or as otherwise permitted by 42 CFR Part 2. A general authorization for
 the   
release of medical or other information is NOT sufficient for this purpose. The   
Federal rules restrict any use of the information to criminally investigate or   
prosecute any alcohol or drug abuse patient.[facility] CampusIn the event this   
information is protected by the Federal Confidentiality of Alcohol and Drug 
Abuse   
Patient Records regulations: This information has been disclosed to you from 
records   
protected by Federal confidentiality rules (42 CFR Part 2). The Federal rules   
prohibit you from making any further disclosure of this information unless 
further   
disclosure is expressly permitted by the written consent of the person to whom 
it   
pertains or as otherwise permitted by 42 CFR Part 2. A general authorization for
 the   
release of medical or other information is NOT sufficient for this purpose. The   
Federal rules restrict any use of the information to criminally investigate or   
prosecute any alcohol or drug abuse patient.[facility] CampusIn the event this   
information is protected by the Federal Confidentiality of Alcohol and Drug 
Abuse   
Patient Records regulations: This information has been disclosed to you from 
records   
protected by Federal confidentiality rules (42 CFR Part 2). The Federal rules   
prohibit you from making any further disclosure of this information unless 
further   
disclosure is expressly permitted by the written consent of the person to whom 
it   
pertains or as otherwise permitted by 42 CFR Part 2. A general authorization for
 the   
release of medical or other information is NOT sufficient for this purpose. The   
Federal rules restrict any use of the information to criminally investigate or   
prosecute any alcohol or drug abuse patient.[facility] CampusIn the event this   
information is protected by the Federal Confidentiality of Alcohol and Drug 
Abuse   
Patient Records regulations: This information has been disclosed to you from 
records   
protected by Federal confidentiality rules (42 CFR Part 2). The Federal rules   
prohibit you from making any further disclosure of this information unless 
further   
disclosure is expressly permitted by the written consent of the person to whom 
it   
pertains or as otherwise permitted by 42 CFR Part 2. A general authorization for
 the   
release of medical or other information is NOT sufficient for this purpose. The   
Federal rules restrict any use of the information to criminally investigate or   
prosecute any alcohol or drug abuse patient.[facility] the event this   
information is protected by the Federal Confidentiality of Alcohol and Drug 
Abuse   
Patient Records regulations: This information has been disclosed to you from 
records   
protected by Federal confidentiality rules (42 CFR Part 2). The Federal rules   
prohibit you from making any further disclosure of this information unless 
further   
disclosure is expressly permitted by the written consent of the person to whom 
it   
pertains or as otherwise permitted by 42 CFR Part 2. A general authorization for
 the   
release of medical or other information is NOT sufficient for this purpose. The   
Federal rules restrict any use of the information to criminally investigate or   
prosecute any alcohol or drug abuse patient.[facility] CampusIn the event this   
information is protected by the Federal Confidentiality of Alcohol and Drug 
Abuse   
Patient Records regulations: This information has been disclosed to you from 
records   
protected by Federal confidentiality rules (42 CFR Part 2). The Federal rules   
prohibit you from making any further disclosure of this information unless 
further   
disclosure is expressly permitted by the written consent of the person to whom 
it   
pertains or as otherwise permitted by 42 CFR Part 2. A general authorization for
 the   
release of medical or other information is NOT sufficient for this purpose. The   
Federal rules restrict any use of the information to criminally investigate or   
prosecute any alcohol or drug abuse patient.[facility] CampusIn the event this   
information is protected by the Federal Confidentiality of Alcohol and Drug 
Abuse   
Patient Records regulations: This information has been disclosed to you from 
records   
protected by Federal confidentiality rules (42 CFR Part 2). The Federal rules   
prohibit you from making any further disclosure of this information unless 
further   
disclosure is expressly permitted by the written consent of the person to whom 
it   
pertains or as otherwise permitted by 42 CFR Part 2. A general authorization for
 the   
release of medical or other information is NOT sufficient for this purpose. The   
Federal rules restrict any use of the information to criminally investigate or   
prosecute any alcohol or drug abuse patient.[facility] CampusIn the event this   
information is protected by the Federal Confidentiality of Alcohol and Drug 
Abuse   
Patient Records regulations: This information has been disclosed to you from 
records   
protected by Federal confidentiality rules (42 CFR Part 2). The Federal rules   
prohibit you from making any further disclosure of this information unless 
further   
disclosure is expressly permitted by the written consent of the person to whom 
it   
pertains or as otherwise permitted by 42 CFR Part 2. A general authorization for
 the   
release of medical or other information is NOT sufficient for this purpose. The   
Federal rules restrict any use of the information to criminally investigate or   
prosecute any alcohol or drug abuse patient.[facility] CampusIn the event this   
information is protected by the Federal Confidentiality of Alcohol and Drug 
Abuse   
Patient Records regulations: This information has been disclosed to you from 
records   
protected by Federal confidentiality rules (42 CFR Part 2). The Federal rules   
prohibit you from making any further disclosure of this information unless 
further   
disclosure is expressly permitted by the written consent of the person to whom 
it   
pertains or as otherwise permitted by 42 CFR Part 2. A general authorization for
 the   
release of medical or other information is NOT sufficient for this purpose. The   
Federal rules restrict any use of the information to criminally investigate or   
prosecute any alcohol or drug abuse patient.[facility] CampusIn the event this   
information is protected by the Federal Confidentiality of Alcohol and Drug 
Abuse   
Patient Records regulations: This information has been disclosed to you from 
records   
protected by Federal confidentiality rules (42 CFR Part 2). The Federal rules   
prohibit you from making any further disclosure of this information unless 
further   
disclosure is expressly permitted by the written consent of the person to whom 
it   
pertains or as otherwise permitted by 42 CFR Part 2. A general authorization for
 the   
release of medical or other information is NOT sufficient for this purpose. The   
Federal rules restrict any use of the information to criminally investigate or   
prosecute any alcohol or drug abuse patient.[facility] CampusIn the event this   
information is protected by the Federal Confidentiality of Alcohol and Drug 
Abuse   
Patient Records regulations: This information has been disclosed to you from 
records   
protected by Federal confidentiality rules (42 CFR Part 2). The Federal rules   
prohibit you from making any further disclosure of this information unless 
further   
disclosure is expressly permitted by the written consent of the person to whom 
it   
pertains or as otherwise permitted by 42 CFR Part 2. A general authorization for
 the   
release of medical or other information is NOT sufficient for this purpose. The   
Federal rules restrict any use of the information to criminally investigate or   
prosecute any alcohol or drug abuse patient.[facility] CampusIn the event this   
information is protected by the Federal Confidentiality of Alcohol and Drug 
Abuse   
Patient Records regulations: This information has been disclosed to you from 
records   
protected by Federal confidentiality rules (42 CFR Part 2). The Federal rules   
prohibit you from making any further disclosure of this information unless 
further   
disclosure is expressly permitted by the written consent of the person to whom 
it   
pertains or as otherwise permitted by 42 CFR Part 2. A general authorization for
 the   
release of medical or other information is NOT sufficient for this purpose. The   
Federal rules restrict any use of the information to criminally investigate or   
prosecute any alcohol or drug abuse patient.[facility] the event this   
information is protected by the Federal Confidentiality of Alcohol and Drug 
Abuse   
Patient Records regulations: This information has been disclosed to you from 
records   
protected by Federal confidentiality rules (42 CFR Part 2). The Federal rules   
prohibit you from making any further disclosure of this information unless 
further   
disclosure is expressly permitted by the written consent of the person to whom 
it   
pertains or as otherwise permitted by 42 CFR Part 2. A general authorization for
 the   
release of medical or other information is NOT sufficient for this purpose. The   
Federal rules restrict any use of the information to criminally investigate or   
prosecute any alcohol or drug abuse patient.[facility] CampusIn the event this   
information is protected by the Federal Confidentiality of Alcohol and Drug 
Abuse   
Patient Records regulations: This information has been disclosed to you from 
records   
protected by Federal confidentiality rules (42 CFR Part 2). The Federal rules   
prohibit you from making any further disclosure of this information unless 
further   
disclosure is expressly permitted by the written consent of the person to whom 
it   
pertains or as otherwise permitted by 42 CFR Part 2. A general authorization for
 the   
release of medical or other information is NOT sufficient for this purpose. The   
Federal rules restrict any use of the information to criminally investigate or   
prosecute any alcohol or drug abuse patient.[facility] CampusIn the event this   
information is protected by the Federal Confidentiality of Alcohol and Drug 
Abuse   
Patient Records regulations: This information has been disclosed to you from 
records   
protected by Federal confidentiality rules (42 CFR Part 2). The Federal rules   
prohibit you from making any further disclosure of this information unless 
further   
disclosure is expressly permitted by the written consent of the person to whom 
it   
pertains or as otherwise permitted by 42 CFR Part 2. A general authorization for
 the   
release of medical or other information is NOT sufficient for this purpose. The   
Federal rules restrict any use of the information to criminally investigate or   
prosecute any alcohol or drug abuse patient.[facility] CampusIn the event this   
information is protected by the Federal Confidentiality of Alcohol and Drug 
Abuse   
Patient Records regulations: This information has been disclosed to you from 
records   
protected by Federal confidentiality rules (42 CFR Part 2). The Federal rules   
prohibit you from making any further disclosure of this information unless 
further   
disclosure is expressly permitted by the written consent of the person to whom 
it   
pertains or as otherwise permitted by 42 CFR Part 2. A general authorization for
 the   
release of medical or other information is NOT sufficient for this purpose. The   
Federal rules restrict any use of the information to criminally investigate or   
prosecute any alcohol or drug abuse patient.[facility] CampusIn the event this   
information is protected by the Federal Confidentiality of Alcohol and Drug 
Abuse   
Patient Records regulations: This information has been disclosed to you from 
records   
protected by Federal confidentiality rules (42 CFR Part 2). The Federal rules   
prohibit you from making any further disclosure of this information unless 
further   
disclosure is expressly permitted by the written consent of the person to whom 
it   
pertains or as otherwise permitted by 42 CFR Part 2. A general authorization for
 the   
release of medical or other information is NOT sufficient for this purpose. The   
Federal rules restrict any use of the information to criminally investigate or   
prosecute any alcohol or drug abuse patient.[facility] CampusIn the event this   
information is protected by the Federal Confidentiality of Alcohol and Drug 
Abuse   
Patient Records regulations: This information has been disclosed to you from 
records   
protected by Federal confidentiality rules (42 CFR Part 2). The Federal rules   
prohibit you from making any further disclosure of this information unless 
further   
disclosure is expressly permitted by the written consent of the person to whom 
it   
pertains or as otherwise permitted by 42 CFR Part 2. A general authorization for
 the   
release of medical or other information is NOT sufficient for this purpose. The   
Federal rules restrict any use of the information to criminally investigate or   
prosecute any alcohol or drug abuse patient.[facility] CampusIn the event this   
information is protected by the Federal Confidentiality of Alcohol and Drug 
Abuse   
Patient Records regulations: This information has been disclosed to you from 
records   
protected by Federal confidentiality rules (42 CFR Part 2). The Federal rules   
prohibit you from making any further disclosure of this information unless 
further   
disclosure is expressly permitted by the written consent of the person to whom 
it   
pertains or as otherwise permitted by 42 CFR Part 2. A general authorization for
 the   
release of medical or other information is NOT sufficient for this purpose. The   
Federal rules restrict any use of the information to criminally investigate or   
prosecute any alcohol or drug abuse patient.[facility] CampusIn the event this   
information is protected by the Federal Confidentiality of Alcohol and Drug 
Abuse   
Patient Records regulations: This information has been disclosed to you from 
records   
protected by Federal confidentiality rules (42 CFR Part 2). The Federal rules   
prohibit you from making any further disclosure of this information unless 
further   
disclosure is expressly permitted by the written consent of the person to whom 
it   
pertains or as otherwise permitted by 42 CFR Part 2. A general authorization for
 the   
release of medical or other information is NOT sufficient for this purpose. The   
Federal rules restrict any use of the information to criminally investigate or   
prosecute any alcohol or drug abuse patient.[facility] CampusIn the event this   
information is protected by the Federal Confidentiality of Alcohol and Drug 
Abuse   
Patient Records regulations: This information has been disclosed to you from 
records   
protected by Federal confidentiality rules (42 CFR Part 2). The Federal rules   
prohibit you from making any further disclosure of this information unless 
further   
disclosure is expressly permitted by the written consent of the person to whom 
it   
pertains or as otherwise permitted by 42 CFR Part 2. A general authorization for
 the   
release of medical or other information is NOT sufficient for this purpose. The   
Federal rules restrict any use of the information to criminally investigate or   
prosecute any alcohol or drug abuse patient.[facility] CampusIn the event this   
information is protected by the Federal Confidentiality of Alcohol and Drug 
Abuse   
Patient Records regulations: This information has been disclosed to you from 
records   
protected by Federal confidentiality rules (42 CFR Part 2). The Federal rules   
prohibit you from making any further disclosure of this information unless 
further   
disclosure is expressly permitted by the written consent of the person to whom 
it   
pertains or as otherwise permitted by 42 CFR Part 2. A general authorization for
 the   
release of medical or other information is NOT sufficient for this purpose. The   
Federal rules restrict any use of the information to criminally investigate or   
prosecute any alcohol or drug abuse patient.[facility] CampusIn the event this   
information is protected by the Federal Confidentiality of Alcohol and Drug 
Abuse   
Patient Records regulations: This information has been disclosed to you from 
records   
protected by Federal confidentiality rules (42 CFR Part 2). The Federal rules   
prohibit you from making any further disclosure of this information unless 
further   
disclosure is expressly permitted by the written consent of the person to whom 
it   
pertains or as otherwise permitted by 42 CFR Part 2. A general authorization for
 the   
release of medical or other information is NOT sufficient for this purpose. The   
Federal rules restrict any use of the information to criminally investigate or   
prosecute any alcohol or drug abuse patient.[facility] CampusIn the event this   
information is protected by the Federal Confidentiality of Alcohol and Drug 
Abuse   
Patient Records regulations: This information has been disclosed to you from 
records   
protected by Federal confidentiality rules (42 CFR Part 2). The Federal rules   
prohibit you from making any further disclosure of this information unless 
further   
disclosure is expressly permitted by the written consent of the person to whom 
it   
pertains or as otherwise permitted by 42 CFR Part 2. A general authorization for
 the   
release of medical or other information is NOT sufficient for this purpose. The   
Federal rules restrict any use of the information to criminally investigate or   
prosecute any alcohol or drug abuse patient.[facility] CampusIn the event this   
information is protected by the Federal Confidentiality of Alcohol and Drug 
Abuse   
Patient Records regulations: This information has been disclosed to you from 
records   
protected by Federal confidentiality rules (42 CFR Part 2). The Federal rules   
prohibit you from making any further disclosure of this information unless 
further   
disclosure is expressly permitted by the written consent of the person to whom 
it   
pertains or as otherwise permitted by 42 CFR Part 2. A general authorization for
 the   
release of medical or other information is NOT sufficient for this purpose. The   
Federal rules restrict any use of the information to criminally investigate or   
prosecute any alcohol or drug abuse patient.[facility] CampusIn the event this   
information is protected by the Federal Confidentiality of Alcohol and Drug 
Abuse   
Patient Records regulations: This information has been disclosed to you from 
records   
protected by Federal confidentiality rules (42 CFR Part 2). The Federal rules   
prohibit you from making any further disclosure of this information unless 
further   
disclosure is expressly permitted by the written consent of the person to whom 
it   
pertains or as otherwise permitted by 42 CFR Part 2. A general authorization for
 the   
release of medical or other information is NOT sufficient for this purpose. The   
Federal rules restrict any use of the information to criminally investigate or   
prosecute any alcohol or drug abuse patient.[facility] CampusIn the event this   
information is protected by the Federal Confidentiality of Alcohol and Drug 
Abuse   
Patient Records regulations: This information has been disclosed to you from 
records   
protected by Federal confidentiality rules (42 CFR Part 2). The Federal rules   
prohibit you from making any further disclosure of this information unless 
further   
disclosure is expressly permitted by the written consent of the person to whom 
it   
pertains or as otherwise permitted by 42 CFR Part 2. A general authorization for
 the   
release of medical or other information is NOT sufficient for this purpose. The   
Federal rules restrict any use of the information to criminally investigate or   
prosecute any alcohol or drug abuse patient.[facility] CampusIn the event this   
information is protected by the Federal Confidentiality of Alcohol and Drug 
Abuse   
Patient Records regulations: This information has been disclosed to you from 
records   
protected by Federal confidentiality rules (42 CFR Part 2). The Federal rules   
prohibit you from making any further disclosure of this information unless 
further   
disclosure is expressly permitted by the written consent of the person to whom 
it   
pertains or as otherwise permitted by 42 CFR Part 2. A general authorization for
 the   
release of medical or other information is NOT sufficient for this purpose. The   
Federal rules restrict any use of the information to criminally investigate or   
prosecute any alcohol or drug abuse patient.[facility] CampusIn the event this   
information is protected by the Federal Confidentiality of Alcohol and Drug 
Abuse   
Patient Records regulations: This information has been disclosed to you from 
records   
protected by Federal confidentiality rules (42 CFR Part 2). The Federal rules   
prohibit you from making any further disclosure of this information unless 
further   
disclosure is expressly permitted by the written consent of the person to whom 
it   
pertains or as otherwise permitted by 42 CFR Part 2. A general authorization for
 the   
release of medical or other information is NOT sufficient for this purpose. The   
Federal rules restrict any use of the information to criminally investigate or   
prosecute any alcohol or drug abuse patient.[facility] CampusIn the event this   
information is protected by the Federal Confidentiality of Alcohol and Drug 
Abuse   
Patient Records regulations: This information has been disclosed to you from 
records   
protected by Federal confidentiality rules (42 CFR Part 2). The Federal rules   
prohibit you from making any further disclosure of this information unless 
further   
disclosure is expressly permitted by the written consent of the person to whom 
it   
pertains or as otherwise permitted by 42 CFR Part 2. A general authorization for
 the   
release of medical or other information is NOT sufficient for this purpose. The   
Federal rules restrict any use of the information to criminally investigate or   
prosecute any alcohol or drug abuse patient.[facility] CampusIn the event this   
information is protected by the Federal Confidentiality of Alcohol and Drug 
Abuse   
Patient Records regulations: This information has been disclosed to you from 
records   
protected by Federal confidentiality rules (42 CFR Part 2). The Federal rules   
prohibit you from making any further disclosure of this information unless 
further   
disclosure is expressly permitted by the written consent of the person to whom 
it   
pertains or as otherwise permitted by 42 CFR Part 2. A general authorization for
 the   
release of medical or other information is NOT sufficient for this purpose. The   
Federal rules restrict any use of the information to criminally investigate or   
prosecute any alcohol or drug abuse patient.[facility] CampusIn the event this   
information is protected by the Federal Confidentiality of Alcohol and Drug 
Abuse   
Patient Records regulations: This information has been disclosed to you from 
records   
protected by Federal confidentiality rules (42 CFR Part 2). The Federal rules   
prohibit you from making any further disclosure of this information unless 
further   
disclosure is expressly permitted by the written consent of the person to whom 
it   
pertains or as otherwise permitted by 42 CFR Part 2. A general authorization for
 the   
release of medical or other information is NOT sufficient for this purpose. The   
Federal rules restrict any use of the information to criminally investigate or   
prosecute any alcohol or drug abuse patient.[facility] CampusIn the event this   
information is protected by the Federal Confidentiality of Alcohol and Drug 
Abuse   
Patient Records regulations: This information has been disclosed to you from 
records   
protected by Federal confidentiality rules (42 CFR Part 2). The Federal rules   
prohibit you from making any further disclosure of this information unless 
further   
disclosure is expressly permitted by the written consent of the person to whom 
it   
pertains or as otherwise permitted by 42 CFR Part 2. A general authorization for
 the   
release of medical or other information is NOT sufficient for this purpose. The   
Federal rules restrict any use of the information to criminally investigate or   
prosecute any alcohol or drug abuse patient.[facility] CampusIn the event this   
information is protected by the Federal Confidentiality of Alcohol and Drug 
Abuse   
Patient Records regulations: This information has been disclosed to you from 
records   
protected by Federal confidentiality rules (42 CFR Part 2). The Federal rules   
prohibit you from making any further disclosure of this information unless 
further   
disclosure is expressly permitted by the written consent of the person to whom 
it   
pertains or as otherwise permitted by 42 CFR Part 2. A general authorization for
 the   
release of medical or other information is NOT sufficient for this purpose. The   
Federal rules restrict any use of the information to criminally investigate or   
prosecute any alcohol or drug abuse patient.[facility] CampusIn the event this   
information is protected by the Federal Confidentiality of Alcohol and Drug 
Abuse   
Patient Records regulations: This information has been disclosed to you from 
records   
protected by Federal confidentiality rules (42 CFR Part 2). The Federal rules   
prohibit you from making any further disclosure of this information unless 
further   
disclosure is expressly permitted by the written consent of the person to whom 
it   
pertains or as otherwise permitted by 42 CFR Part 2. A general authorization for
 the   
release of medical or other information is NOT sufficient for this purpose. The   
Federal rules restrict any use of the information to criminally investigate or   
prosecute any alcohol or drug abuse patient.[facility] CampusIn the event this   
information is protected by the Federal Confidentiality of Alcohol and Drug 
Abuse   
Patient Records regulations: This information has been disclosed to you from 
records   
protected by Federal confidentiality rules (42 CFR Part 2). The Federal rules   
prohibit you from making any further disclosure of this information unless 
further   
disclosure is expressly permitted by the written consent of the person to whom 
it   
pertains or as otherwise permitted by 42 CFR Part 2. A general authorization for
 the   
release of medical or other information is NOT sufficient for this purpose. The   
Federal rules restrict any use of the information to criminally investigate or   
prosecute any alcohol or drug abuse patient.[facility] CampusIn the event this   
information is protected by the Federal Confidentiality of Alcohol and Drug 
Abuse   
Patient Records regulations: This information has been disclosed to you from 
records   
protected by Federal confidentiality rules (42 CFR Part 2). The Federal rules   
prohibit you from making any further disclosure of this information unless 
further   
disclosure is expressly permitted by the written consent of the person to whom 
it   
pertains or as otherwise permitted by 42 CFR Part 2. A general authorization for
 the   
release of medical or other information is NOT sufficient for this purpose. The   
Federal rules restrict any use of the information to criminally investigate or   
prosecute any alcohol or drug abuse patient.[facility] CampusIn the event this   
information is protected by the Federal Confidentiality of Alcohol and Drug 
Abuse   
Patient Records regulations: This information has been disclosed to you from 
records   
protected by Federal confidentiality rules (42 CFR Part 2). The Federal rules   
prohibit you from making any further disclosure of this information unless 
further   
disclosure is expressly permitted by the written consent of the person to whom 
it   
pertains or as otherwise permitted by 42 CFR Part 2. A general authorization for
 the   
release of medical or other information is NOT sufficient for this purpose. The   
Federal rules restrict any use of the information to criminally investigate or   
prosecute any alcohol or drug abuse patient.[facility] CampusIn the event this   
information is protected by the Federal Confidentiality of Alcohol and Drug 
Abuse   
Patient Records regulations: This information has been disclosed to you from 
records   
protected by Federal confidentiality rules (42 CFR Part 2). The Federal rules   
prohibit you from making any further disclosure of this information unless 
further   
disclosure is expressly permitted by the written consent of the person to whom 
it   
pertains or as otherwise permitted by 42 CFR Part 2. A general authorization for
 the   
release of medical or other information is NOT sufficient for this purpose. The   
Federal rules restrict any use of the information to criminally investigate or   
prosecute any alcohol or drug abuse patient.[facility] CampusIn the event this   
information is protected by the Federal Confidentiality of Alcohol and Drug 
Abuse   
Patient Records regulations: This information has been disclosed to you from 
records   
protected by Federal confidentiality rules (42 CFR Part 2). The Federal rules   
prohibit you from making any further disclosure of this information unless 
further   
disclosure is expressly permitted by the written consent of the person to whom 
it   
pertains or as otherwise permitted by 42 CFR Part 2. A general authorization for
 the   
release of medical or other information is NOT sufficient for this purpose. The   
Federal rules restrict any use of the information to criminally investigate or   
prosecute any alcohol or drug abuse patient.[facility] CampusIn the event this   
information is protected by the Federal Confidentiality of Alcohol and Drug 
Abuse   
Patient Records regulations: This information has been disclosed to you from 
records   
protected by Federal confidentiality rules (42 CFR Part 2). The Federal rules   
prohibit you from making any further disclosure of this information unless 
further   
disclosure is expressly permitted by the written consent of the person to whom 
it   
pertains or as otherwise permitted by 42 CFR Part 2. A general authorization for
 the   
release of medical or other information is NOT sufficient for this purpose. The   
Federal rules restrict any use of the information to criminally investigate or   
prosecute any alcohol or drug abuse patient.[facility] CampusIn the event this   
information is protected by the Federal Confidentiality of Alcohol and Drug 
Abuse   
Patient Records regulations: This information has been disclosed to you from 
records   
protected by Federal confidentiality rules (42 CFR Part 2). The Federal rules   
prohibit you from making any further disclosure of this information unless 
further   
disclosure is expressly permitted by the written consent of the person to whom 
it   
pertains or as otherwise permitted by 42 CFR Part 2. A general authorization for
 the   
release of medical or other information is NOT sufficient for this purpose. The   
Federal rules restrict any use of the information to criminally investigate or   
prosecute any alcohol or drug abuse patient.[facility] the event this   
information is protected by the Federal Confidentiality of Alcohol and Drug 
Abuse   
Patient Records regulations: This information has been disclosed to you from 
records   
protected by Federal confidentiality rules (42 CFR Part 2). The Federal rules   
prohibit you from making any further disclosure of this information unless 
further   
disclosure is expressly permitted by the written consent of the person to whom 
it   
pertains or as otherwise permitted by 42 CFR Part 2. A general authorization for
 the   
release of medical or other information is NOT sufficient for this purpose. The   
Federal rules restrict any use of the information to criminally investigate or   
prosecute any alcohol or drug abuse patient.[facility] CampusIn the event this   
information is protected by the Federal Confidentiality of Alcohol and Drug 
Abuse   
Patient Records regulations: This information has been disclosed to you from 
records   
protected by Federal confidentiality rules (42 CFR Part 2). The Federal rules   
prohibit you from making any further disclosure of this information unless 
further   
disclosure is expressly permitted by the written consent of the person to whom 
it   
pertains or as otherwise permitted by 42 CFR Part 2. A general authorization for
 the   
release of medical or other information is NOT sufficient for this purpose. The   
Federal rules restrict any use of the information to criminally investigate or   
prosecute any alcohol or drug abuse patient.[facility] CampusIn the event this   
information is protected by the Federal Confidentiality of Alcohol and Drug 
Abuse   
Patient Records regulations: This information has been disclosed to you from 
records   
protected by Federal confidentiality rules (42 CFR Part 2). The Federal rules   
prohibit you from making any further disclosure of this information unless 
further   
disclosure is expressly permitted by the written consent of the person to whom 
it   
pertains or as otherwise permitted by 42 CFR Part 2. A general authorization for
 the   
release of medical or other information is NOT sufficient for this purpose. The   
Federal rules restrict any use of the information to criminally investigate or   
prosecute any alcohol or drug abuse patient.[facility] CampusIn the event this   
information is protected by the Federal Confidentiality of Alcohol and Drug 
Abuse   
Patient Records regulations: This information has been disclosed to you from 
records   
protected by Federal confidentiality rules (42 CFR Part 2). The Federal rules   
prohibit you from making any further disclosure of this information unless 
further   
disclosure is expressly permitted by the written consent of the person to whom 
it   
pertains or as otherwise permitted by 42 CFR Part 2. A general authorization for
 the   
release of medical or other information is NOT sufficient for this purpose. The   
Federal rules restrict any use of the information to criminally investigate or   
prosecute any alcohol or drug abuse patient.[facility] CampusIn the event this   
information is protected by the Federal Confidentiality of Alcohol and Drug 
Abuse   
Patient Records regulations: This information has been disclosed to you from 
records   
protected by Federal confidentiality rules (42 CFR Part 2). The Federal rules   
prohibit you from making any further disclosure of this information unless 
further   
disclosure is expressly permitted by the written consent of the person to whom 
it   
pertains or as otherwise permitted by 42 CFR Part 2. A general authorization for
 the   
release of medical or other information is NOT sufficient for this purpose. The   
Federal rules restrict any use of the information to criminally investigate or   
prosecute any alcohol or drug abuse patient.[facility] CampusIn the event this   
information is protected by the Federal Confidentiality of Alcohol and Drug 
Abuse   
Patient Records regulations: This information has been disclosed to you from 
records   
protected by Federal confidentiality rules (42 CFR Part 2). The Federal rules   
prohibit you from making any further disclosure of this information unless 
further   
disclosure is expressly permitted by the written consent of the person to whom 
it   
pertains or as otherwise permitted by 42 CFR Part 2. A general authorization for
 the   
release of medical or other information is NOT sufficient for this purpose. The   
Federal rules restrict any use of the information to criminally investigate or   
prosecute any alcohol or drug abuse patient.[facility] CampusIn the event this   
information is protected by the Federal Confidentiality of Alcohol and Drug 
Abuse   
Patient Records regulations: This information has been disclosed to you from 
records   
protected by Federal confidentiality rules (42 CFR Part 2). The Federal rules   
prohibit you from making any further disclosure of this information unless 
further   
disclosure is expressly permitted by the written consent of the person to whom 
it   
pertains or as otherwise permitted by 42 CFR Part 2. A general authorization for
 the   
release of medical or other information is NOT sufficient for this purpose. The   
Federal rules restrict any use of the information to criminally investigate or   
prosecute any alcohol or drug abuse patient.[facility] CampusIn the event this   
information is protected by the Federal Confidentiality of Alcohol and Drug 
Abuse   
Patient Records regulations: This information has been disclosed to you from 
records   
protected by Federal confidentiality rules (42 CFR Part 2). The Federal rules   
prohibit you from making any further disclosure of this information unless 
further   
disclosure is expressly permitted by the written consent of the person to whom 
it   
pertains or as otherwise permitted by 42 CFR Part 2. A general authorization for
 the   
release of medical or other information is NOT sufficient for this purpose. The   
Federal rules restrict any use of the information to criminally investigate or   
prosecute any alcohol or drug abuse patient.[facility] CampusIn the event this   
information is protected by the Federal Confidentiality of Alcohol and Drug 
Abuse   
Patient Records regulations: This information has been disclosed to you from 
records   
protected by Federal confidentiality rules (42 CFR Part 2). The Federal rules   
prohibit you from making any further disclosure of this information unless 
further   
disclosure is expressly permitted by the written consent of the person to whom 
it   
pertains or as otherwise permitted by 42 CFR Part 2. A general authorization for
 the   
release of medical or other information is NOT sufficient for this purpose. The   
Federal rules restrict any use of the information to criminally investigate or   
prosecute any alcohol or drug abuse patient.[facility] CampusIn the event this   
information is protected by the Federal Confidentiality of Alcohol and Drug 
Abuse   
Patient Records regulations: This information has been disclosed to you from 
records   
protected by Federal confidentiality rules (42 CFR Part 2). The Federal rules   
prohibit you from making any further disclosure of this information unless 
further   
disclosure is expressly permitted by the written consent of the person to whom 
it   
pertains or as otherwise permitted by 42 CFR Part 2. A general authorization for
 the   
release of medical or other information is NOT sufficient for this purpose. The   
Federal rules restrict any use of the information to criminally investigate or   
prosecute any alcohol or drug abuse patient.[facility] CampusIn the event this   
information is protected by the Federal Confidentiality of Alcohol and Drug 
Abuse   
Patient Records regulations: This information has been disclosed to you from 
records   
protected by Federal confidentiality rules (42 CFR Part 2). The Federal rules   
prohibit you from making any further disclosure of this information unless 
further   
disclosure is expressly permitted by the written consent of the person to whom 
it   
pertains or as otherwise permitted by 42 CFR Part 2. A general authorization for
 the   
release of medical or other information is NOT sufficient for this purpose. The   
Federal rules restrict any use of the information to criminally investigate or   
prosecute any alcohol or drug abuse patient.[facility] CampusIn the event this   
information is protected by the Federal Confidentiality of Alcohol and Drug 
Abuse   
Patient Records regulations: This information has been disclosed to you from 
records   
protected by Federal confidentiality rules (42 CFR Part 2). The Federal rules   
prohibit you from making any further disclosure of this information unless 
further   
disclosure is expressly permitted by the written consent of the person to whom 
it   
pertains or as otherwise permitted by 42 CFR Part 2. A general authorization for
 the   
release of medical or other information is NOT sufficient for this purpose. The   
Federal rules restrict any use of the information to criminally investigate or   
prosecute any alcohol or drug abuse patient.[facility] the event this   
information is protected by the Federal Confidentiality of Alcohol and Drug 
Abuse   
Patient Records regulations: This information has been disclosed to you from 
records   
protected by Federal confidentiality rules (42 CFR Part 2). The Federal rules   
prohibit you from making any further disclosure of this information unless 
further   
disclosure is expressly permitted by the written consent of the person to whom 
it   
pertains or as otherwise permitted by 42 CFR Part 2. A general authorization for
 the   
release of medical or other information is NOT sufficient for this purpose. The   
Federal rules restrict any use of the information to criminally investigate or   
prosecute any alcohol or drug abuse patient.[facility] CampusIn the event this   
information is protected by the Federal Confidentiality of Alcohol and Drug 
Abuse   
Patient Records regulations: This information has been disclosed to you from 
records   
protected by Federal confidentiality rules (42 CFR Part 2). The Federal rules   
prohibit you from making any further disclosure of this information unless 
further   
disclosure is expressly permitted by the written consent of the person to whom 
it   
pertains or as otherwise permitted by 42 CFR Part 2. A general authorization for
 the   
release of medical or other information is NOT sufficient for this purpose. The   
Federal rules restrict any use of the information to criminally investigate or   
prosecute any alcohol or drug abuse patient.[facility] CampusIn the event this   
information is protected by the Federal Confidentiality of Alcohol and Drug 
Abuse   
Patient Records regulations: This information has been disclosed to you from 
records   
protected by Federal confidentiality rules (42 CFR Part 2). The Federal rules   
prohibit you from making any further disclosure of this information unless 
further   
disclosure is expressly permitted by the written consent of the person to whom 
it   
pertains or as otherwise permitted by 42 CFR Part 2. A general authorization for
 the   
release of medical or other information is NOT sufficient for this purpose. The   
Federal rules restrict any use of the information to criminally investigate or   
prosecute any alcohol or drug abuse patient.[facility] CampusIn the event this   
information is protected by the Federal Confidentiality of Alcohol and Drug 
Abuse   
Patient Records regulations: This information has been disclosed to you from 
records   
protected by Federal confidentiality rules (42 CFR Part 2). The Federal rules   
prohibit you from making any further disclosure of this information unless 
further   
disclosure is expressly permitted by the written consent of the person to whom 
it   
pertains or as otherwise permitted by 42 CFR Part 2. A general authorization for
 the   
release of medical or other information is NOT sufficient for this purpose. The   
Federal rules restrict any use of the information to criminally investigate or   
prosecute any alcohol or drug abuse patient.[facility] CampusIn the event this   
information is protected by the Federal Confidentiality of Alcohol and Drug 
Abuse   
Patient Records regulations: This information has been disclosed to you from 
records   
protected by Federal confidentiality rules (42 CFR Part 2). The Federal rules   
prohibit you from making any further disclosure of this information unless 
further   
disclosure is expressly permitted by the written consent of the person to whom 
it   
pertains or as otherwise permitted by 42 CFR Part 2. A general authorization for
 the   
release of medical or other information is NOT sufficient for this purpose. The   
Federal rules restrict any use of the information to criminally investigate or   
prosecute any alcohol or drug abuse patient.[facility] CampusIn the event this   
information is protected by the Federal Confidentiality of Alcohol and Drug 
Abuse   
Patient Records regulations: This information has been disclosed to you from 
records   
protected by Federal confidentiality rules (42 CFR Part 2). The Federal rules   
prohibit you from making any further disclosure of this information unless 
further   
disclosure is expressly permitted by the written consent of the person to whom 
it   
pertains or as otherwise permitted by 42 CFR Part 2. A general authorization for
 the   
release of medical or other information is NOT sufficient for this purpose. The   
Federal rules restrict any use of the information to criminally investigate or   
prosecute any alcohol or drug abuse patient.[facility] CampusIn the event this   
information is protected by the Federal Confidentiality of Alcohol and Drug 
Abuse   
Patient Records regulations: This information has been disclosed to you from 
records   
protected by Federal confidentiality rules (42 CFR Part 2). The Federal rules   
prohibit you from making any further disclosure of this information unless 
further   
disclosure is expressly permitted by the written consent of the person to whom 
it   
pertains or as otherwise permitted by 42 CFR Part 2. A general authorization for
 the   
release of medical or other information is NOT sufficient for this purpose. The   
Federal rules restrict any use of the information to criminally investigate or   
prosecute any alcohol or drug abuse patient.[facility] CampusIn the event this   
information is protected by the Federal Confidentiality of Alcohol and Drug 
Abuse   
Patient Records regulations: This information has been disclosed to you from 
records   
protected by Federal confidentiality rules (42 CFR Part 2). The Federal rules   
prohibit you from making any further disclosure of this information unless 
further   
disclosure is expressly permitted by the written consent of the person to whom 
it   
pertains or as otherwise permitted by 42 CFR Part 2. A general authorization for
 the   
release of medical or other information is NOT sufficient for this purpose. The   
Federal rules restrict any use of the information to criminally investigate or   
prosecute any alcohol or drug abuse patient.[facility] CampusIn the event this   
information is protected by the Federal Confidentiality of Alcohol and Drug 
Abuse   
Patient Records regulations: This information has been disclosed to you from 
records   
protected by Federal confidentiality rules (42 CFR Part 2). The Federal rules   
prohibit you from making any further disclosure of this information unless 
further   
disclosure is expressly permitted by the written consent of the person to whom 
it   
pertains or as otherwise permitted by 42 CFR Part 2. A general authorization for
 the   
release of medical or other information is NOT sufficient for this purpose. The   
Federal rules restrict any use of the information to criminally investigate or   
prosecute any alcohol or drug abuse patient.[facility] CampusIn the event this   
information is protected by the Federal Confidentiality of Alcohol and Drug 
Abuse   
Patient Records regulations: This information has been disclosed to you from 
records   
protected by Federal confidentiality rules (42 CFR Part 2). The Federal rules   
prohibit you from making any further disclosure of this information unless 
further   
disclosure is expressly permitted by the written consent of the person to whom 
it   
pertains or as otherwise permitted by 42 CFR Part 2. A general authorization for
 the   
release of medical or other information is NOT sufficient for this purpose. The   
Federal rules restrict any use of the information to criminally investigate or   
prosecute any alcohol or drug abuse patient.[facility] CampusIn the event this   
information is protected by the Federal Confidentiality of Alcohol and Drug 
Abuse   
Patient Records regulations: This information has been disclosed to you from 
records   
protected by Federal confidentiality rules (42 CFR Part 2). The Federal rules   
prohibit you from making any further disclosure of this information unless 
further   
disclosure is expressly permitted by the written consent of the person to whom 
it   
pertains or as otherwise permitted by 42 CFR Part 2. A general authorization for
 the   
release of medical or other information is NOT sufficient for this purpose. The   
Federal rules restrict any use of the information to criminally investigate or   
prosecute any alcohol or drug abuse patient.[facility] CampusIn the event this   
information is protected by the Federal Confidentiality of Alcohol and Drug 
Abuse   
Patient Records regulations: This information has been disclosed to you from 
records   
protected by Federal confidentiality rules (42 CFR Part 2). The Federal rules   
prohibit you from making any further disclosure of this information unless 
further   
disclosure is expressly permitted by the written consent of the person to whom 
it   
pertains or as otherwise permitted by 42 CFR Part 2. A general authorization for
 the   
release of medical or other information is NOT sufficient for this purpose. The   
Federal rules restrict any use of the information to criminally investigate or   
prosecute any alcohol or drug abuse patient.[facility] CampusIn the event this   
information is protected by the Federal Confidentiality of Alcohol and Drug 
Abuse   
Patient Records regulations: This information has been disclosed to you from 
records   
protected by Federal confidentiality rules (42 CFR Part 2). The Federal rules   
prohibit you from making any further disclosure of this information unless 
further   
disclosure is expressly permitted by the written consent of the person to whom 
it   
pertains or as otherwise permitted by 42 CFR Part 2. A general authorization for
 the   
release of medical or other information is NOT sufficient for this purpose. The   
Federal rules restrict any use of the information to criminally investigate or   
prosecute any alcohol or drug abuse patient.[facility] CampusIn the event this   
information is protected by the Federal Confidentiality of Alcohol and Drug 
Abuse   
Patient Records regulations: This information has been disclosed to you from 
records   
protected by Federal confidentiality rules (42 CFR Part 2). The Federal rules   
prohibit you from making any further disclosure of this information unless 
further   
disclosure is expressly permitted by the written consent of the person to whom 
it   
pertains or as otherwise permitted by 42 CFR Part 2. A general authorization for
 the   
release of medical or other information is NOT sufficient for this purpose. The   
Federal rules restrict any use of the information to criminally investigate or   
prosecute any alcohol or drug abuse patient.[facility] CampusIn the event this   
information is protected by the Federal Confidentiality of Alcohol and Drug 
Abuse   
Patient Records regulations: This information has been disclosed to you from 
records   
protected by Federal confidentiality rules (42 CFR Part 2). The Federal rules   
prohibit you from making any further disclosure of this information unless 
further   
disclosure is expressly permitted by the written consent of the person to whom 
it   
pertains or as otherwise permitted by 42 CFR Part 2. A general authorization for
 the   
release of medical or other information is NOT sufficient for this purpose. The   
Federal rules restrict any use of the information to criminally investigate or   
prosecute any alcohol or drug abuse patient.[facility] CampusIn the event this   
information is protected by the Federal Confidentiality of Alcohol and Drug 
Abuse   
Patient Records regulations: This information has been disclosed to you from 
records   
protected by Federal confidentiality rules (42 CFR Part 2). The Federal rules   
prohibit you from making any further disclosure of this information unless 
further   
disclosure is expressly permitted by the written consent of the person to whom 
it   
pertains or as otherwise permitted by 42 CFR Part 2. A general authorization for
 the   
release of medical or other information is NOT sufficient for this purpose. The   
Federal rules restrict any use of the information to criminally investigate or   
prosecute any alcohol or drug abuse patient.[facility] CampusIn the event this   
information is protected by the Federal Confidentiality of Alcohol and Drug 
Abuse   
Patient Records regulations: This information has been disclosed to you from 
records   
protected by Federal confidentiality rules (42 CFR Part 2). The Federal rules   
prohibit you from making any further disclosure of this information unless 
further   
disclosure is expressly permitted by the written consent of the person to whom 
it   
pertains or as otherwise permitted by 42 CFR Part 2. A general authorization for
 the   
release of medical or other information is NOT sufficient for this purpose. The   
Federal rules restrict any use of the information to criminally investigate or   
prosecute any alcohol or drug abuse patient.[facility] CampusIn the event this   
information is protected by the Federal Confidentiality of Alcohol and Drug 
Abuse   
Patient Records regulations: This information has been disclosed to you from 
records   
protected by Federal confidentiality rules (42 CFR Part 2). The Federal rules   
prohibit you from making any further disclosure of this information unless 
further   
disclosure is expressly permitted by the written consent of the person to whom 
it   
pertains or as otherwise permitted by 42 CFR Part 2. A general authorization for
 the   
release of medical or other information is NOT sufficient for this purpose. The   
Federal rules restrict any use of the information to criminally investigate or   
prosecute any alcohol or drug abuse patient.[facility] CampusIn the event this   
information is protected by the Federal Confidentiality of Alcohol and Drug 
Abuse   
Patient Records regulations: This information has been disclosed to you from 
records   
protected by Federal confidentiality rules (42 CFR Part 2). The Federal rules   
prohibit you from making any further disclosure of this information unless 
further   
disclosure is expressly permitted by the written consent of the person to whom 
it   
pertains or as otherwise permitted by 42 CFR Part 2. A general authorization for
 the   
release of medical or other information is NOT sufficient for this purpose. The   
Federal rules restrict any use of the information to criminally investigate or   
prosecute any alcohol or drug abuse patient.[facility] CampusIn the event this   
information is protected by the Federal Confidentiality of Alcohol and Drug 
Abuse   
Patient Records regulations: This information has been disclosed to you from 
records   
protected by Federal confidentiality rules (42 CFR Part 2). The Federal rules   
prohibit you from making any further disclosure of this information unless 
further   
disclosure is expressly permitted by the written consent of the person to whom 
it   
pertains or as otherwise permitted by 42 CFR Part 2. A general authorization for
 the   
release of medical or other information is NOT sufficient for this purpose. The   
Federal rules restrict any use of the information to criminally investigate or   
prosecute any alcohol or drug abuse patient.[facility] CampusIn the event this   
information is protected by the Federal Confidentiality of Alcohol and Drug 
Abuse   
Patient Records regulations: This information has been disclosed to you from 
records   
protected by Federal confidentiality rules (42 CFR Part 2). The Federal rules   
prohibit you from making any further disclosure of this information unless 
further   
disclosure is expressly permitted by the written consent of the person to whom 
it   
pertains or as otherwise permitted by 42 CFR Part 2. A general authorization for
 the   
release of medical or other information is NOT sufficient for this purpose. The   
Federal rules restrict any use of the information to criminally investigate or   
prosecute any alcohol or drug abuse patient.[facility] CampusIn the event this   
information is protected by the Federal Confidentiality of Alcohol and Drug 
Abuse   
Patient Records regulations: This information has been disclosed to you from 
records   
protected by Federal confidentiality rules (42 CFR Part 2). The Federal rules   
prohibit you from making any further disclosure of this information unless 
further   
disclosure is expressly permitted by the written consent of the person to whom 
it   
pertains or as otherwise permitted by 42 CFR Part 2. A general authorization for
 the   
release of medical or other information is NOT sufficient for this purpose. The   
Federal rules restrict any use of the information to criminally investigate or   
prosecute any alcohol or drug abuse patient.[facility] CampusIn the event this   
information is protected by the Federal Confidentiality of Alcohol and Drug 
Abuse   
Patient Records regulations: This information has been disclosed to you from 
records   
protected by Federal confidentiality rules (42 CFR Part 2). The Federal rules   
prohibit you from making any further disclosure of this information unless 
further   
disclosure is expressly permitted by the written consent of the person to whom 
it   
pertains or as otherwise permitted by 42 CFR Part 2. A general authorization for
 the   
release of medical or other information is NOT sufficient for this purpose. The   
Federal rules restrict any use of the information to criminally investigate or   
prosecute any alcohol or drug abuse patient.[facility] CampusIn the event this   
information is protected by the Federal Confidentiality of Alcohol and Drug 
Abuse   
Patient Records regulations: This information has been disclosed to you from 
records   
protected by Federal confidentiality rules (42 CFR Part 2). The Federal rules   
prohibit you from making any further disclosure of this information unless 
further   
disclosure is expressly permitted by the written consent of the person to whom 
it   
pertains or as otherwise permitted by 42 CFR Part 2. A general authorization for
 the   
release of medical or other information is NOT sufficient for this purpose. The   
Federal rules restrict any use of the information to criminally investigate or   
prosecute any alcohol or drug abuse patient.[facility] CampusIn the event this   
information is protected by the Federal Confidentiality of Alcohol and Drug 
Abuse   
Patient Records regulations: This information has been disclosed to you from 
records   
protected by Federal confidentiality rules (42 CFR Part 2). The Federal rules   
prohibit you from making any further disclosure of this information unless 
further   
disclosure is expressly permitted by the written consent of the person to whom 
it   
pertains or as otherwise permitted by 42 CFR Part 2. A general authorization for
 the   
release of medical or other information is NOT sufficient for this purpose. The   
Federal rules restrict any use of the information to criminally investigate or   
prosecute any alcohol or drug abuse patient.[facility] CampusIn the event this   
information is protected by the Federal Confidentiality of Alcohol and Drug 
Abuse   
Patient Records regulations: This information has been disclosed to you from 
records   
protected by Federal confidentiality rules (42 CFR Part 2). The Federal rules   
prohibit you from making any further disclosure of this information unless 
further   
disclosure is expressly permitted by the written consent of the person to whom 
it   
pertains or as otherwise permitted by 42 CFR Part 2. A general authorization for
 the   
release of medical or other information is NOT sufficient for this purpose. The   
Federal rules restrict any use of the information to criminally investigate or   
prosecute any alcohol or drug abuse patient.[facility] CampusIn the event this   
information is protected by the Federal Confidentiality of Alcohol and Drug 
Abuse   
Patient Records regulations: This information has been disclosed to you from 
records   
protected by Federal confidentiality rules (42 CFR Part 2). The Federal rules   
prohibit you from making any further disclosure of this information unless 
further   
disclosure is expressly permitted by the written consent of the person to whom 
it   
pertains or as otherwise permitted by 42 CFR Part 2. A general authorization for
 the   
release of medical or other information is NOT sufficient for this purpose. The   
Federal rules restrict any use of the information to criminally investigate or   
prosecute any alcohol or drug abuse patient.[facility] CampusIn the event this   
information is protected by the Federal Confidentiality of Alcohol and Drug 
Abuse   
Patient Records regulations: This information has been disclosed to you from 
records   
protected by Federal confidentiality rules (42 CFR Part 2). The Federal rules   
prohibit you from making any further disclosure of this information unless 
further   
disclosure is expressly permitted by the written consent of the person to whom 
it   
pertains or as otherwise permitted by 42 CFR Part 2. A general authorization for
 the   
release of medical or other information is NOT sufficient for this purpose. The   
Federal rules restrict any use of the information to criminally investigate or   
prosecute any alcohol or drug abuse patient.[facility] CampusIn the event this   
information is protected by the Federal Confidentiality of Alcohol and Drug 
Abuse   
Patient Records regulations: This information has been disclosed to you from 
records   
protected by Federal confidentiality rules (42 CFR Part 2). The Federal rules   
prohibit you from making any further disclosure of this information unless 
further   
disclosure is expressly permitted by the written consent of the person to whom 
it   
pertains or as otherwise permitted by 42 CFR Part 2. A general authorization for
 the   
release of medical or other information is NOT sufficient for this purpose. The   
Federal rules restrict any use of the information to criminally investigate or   
prosecute any alcohol or drug abuse patient.[facility] CampusIn the event this   
information is protected by the Federal Confidentiality of Alcohol and Drug 
Abuse   
Patient Records regulations: This information has been disclosed to you from 
records   
protected by Federal confidentiality rules (42 CFR Part 2). The Federal rules   
prohibit you from making any further disclosure of this information unless 
further   
disclosure is expressly permitted by the written consent of the person to whom 
it   
pertains or as otherwise permitted by 42 CFR Part 2. A general authorization for
 the   
release of medical or other information is NOT sufficient for this purpose. The   
Federal rules restrict any use of the information to criminally investigate or   
prosecute any alcohol or drug abuse patient.[facility] CampusIn the event this   
information is protected by the Federal Confidentiality of Alcohol and Drug 
Abuse   
Patient Records regulations: This information has been disclosed to you from 
records   
protected by Federal confidentiality rules (42 CFR Part 2). The Federal rules   
prohibit you from making any further disclosure of this information unless 
further   
disclosure is expressly permitted by the written consent of the person to whom 
it   
pertains or as otherwise permitted by 42 CFR Part 2. A general authorization for
 the   
release of medical or other information is NOT sufficient for this purpose. The   
Federal rules restrict any use of the information to criminally investigate or   
prosecute any alcohol or drug abuse patient.[facility]  
  
  
  
  
                                                    Reason for Visit (unrecogniz  
ed section and content)  
   
                                          
  
  
  
                                          
   
                                          
  
  
  
                                        Reason              Comments  
   
                                        Orders                
  
  
  
                                Reason          Onset Date      Comments  
   
                                Refill Request  2022        
  
  
  
                                        Reason              Comments  
   
                                        Patient Question      
  
  
  
                                        Reason              Comments  
   
                                        F/U 6 months          
  
  
  
                                        Reason              Comments  
   
                                        Results               
  
  
  
                                        Reason              Comments  
   
                                        Home Care           Confirmation Call  
  
  
  
                                        Reason              Comments  
   
                                        Patient Update        
  
  
  
                                        Reason              Comments  
   
                                        Home Care           PDGM  
  
  
  
                                        Reason              Comments  
   
                                        Home Care             
  
  
  
                                        Reason              Comments  
   
                                        Home Health calling for verbal order   
  
  
  
                                        Reason              Comments  
   
                                        Home Care           Fall without injury  
  
  
  
                                        Reason              Comments  
   
                                        Consult               
   
                                        Orders                
  
  
  
                                        Reason              Comments  
   
                                        New Patient           
  
  
  
                                        Reason              Comments  
   
                                        OT EVAL               
  
  
  
                          Specialty    Diagnoses / Procedures Referred By Contac  
t Referred To Contact  
   
                                                    [facility]                               
  
  
Diagnoses  
  
  
Peripheral   
polyneuropathy  
  
  
Abnormality of gait  
  
  
  
Procedures  
  
  
CONSULT TO OCCUPATIONAL   
THER  
  
  
OCCUPATIONAL THERAPY   
EVAL HIGH COMPLEX 60   
MINS                                      
  
  
Júnior Ahuja MD  
  
  
[address] [place] [address]  
  
  
Phone: [phone]  
  
  
Fax: [phone]                         
  
  
[facility]  
  
  
[address] [place] [address]  
  
  
  
                          Referral ID  Status       Reason       Start   
Date                                    Expiration   
Date                                    Visits   
Requested                               Visits   
Authorized  
   
                                [id number]        Authorized        
  
  
PCP Requested   
Referral  
  
  
Auto-Generate  
d Referral                              10/17/202  
2                   10/17/2023          99                  99  
  
  
  
                                        Reason              Comments  
   
                                        Forms                 
  
  
  
                                        Reason              Comments  
   
                                        New Patient Evaluation   
  
  
  
                                        Reason              Comments  
   
                                        Cirrhosis             
  
  
  
                          Specialty    Diagnoses / Procedures Referred By Contac  
t Referred To Contact  
   
                                                              
  
  
Diagnoses  
  
  
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   
ascites, unspecified hepatic   
cirrhosis type (HCC)  
  
  
Splenomegaly  
  
  
  
Procedures  
  
  
CONSULT TO HEPATOLOGY  
  
  
OFFICE/OUTPATIENT [facility] 60-74 MINUTES                         
  
  
Tello Ortega DO  
  
  
[address] [place] [address]  
  
  
Phone: [phone]  
  
  
Fax: [phone]                         
  
  
  
  
  
                    Referral ID Status    Reason    Start Date Expiration Date V  
isits   
Requested                               Visits   
Authorized  
   
                                [id number]        Closed            
  
  
PCP Requested   
Referral        2023        1               1  
  
  
  
                                        Reason              Comments  
   
                                        Rash                  
  
  
  
                                        Reason              Comments  
   
                                        Non-Chemotherapy Treatment   
  
  
  
                          Specialty    Diagnoses / Procedures Referred By Contac  
t Referred To Contact  
   
                                                              
  
  
Diagnoses  
  
  
Iron deficiency anemia due to   
chronic blood loss  
  
  
Iron malabsorption  
                                          
  
  
Tello Ortega, DO  
  
  
[address]  
  
  
[place] [address]  
  
  
Phone: [phone]  
  
  
Fax: [phone]                         
  
  
[facility]  
  
  
[address]  
  
  
[place] [address]  
  
  
Phone: [phone]  
  
  
Fax: [phone]  
  
  
  
                    Referral ID Status    Reason    Start Date Expiration Date V  
isits   
Requested                               Visits   
Authorized  
   
                [id number] Authorized         2023 99      99  
  
  
  
                                        Reason              Comments  
   
                                        Consult             colonoscopy  
  
  
  
                                        Reason              Comments  
   
                                        Appointment           
  
  
  
                                        Reason              Comments  
   
                                        Patient Update        
   
                                        Patient Question      
  
  
  
                                        Reason              Comments  
   
                                        Follow Up             
  
  
  
                                Reason          Onset Date      Comments  
   
                                Refill Request  2023        
  
  
  
                                        Reason              Comments  
   
                                        sores on lower legs X 2 weeks  
  
  
  
                                        Reason              Comments  
   
                                        Consult               
  
  
  
                                        Reason              Comments  
   
                                        [facility] follow-up   
  
  
  
                                        Reason              Comments  
   
                                        Radiology Pre Procedure Instructions   
  
  
  
                                        Reason              Comments  
   
                                        Radiology MRI       liver, under general  
 anesthesia  
  
  
  
                          Specialty    Diagnoses / Procedures Referred By Contac  
t Referred To Contact  
   
                                        [facility]            
  
  
Diagnoses  
  
  
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   
ascites, unspecified hepatic   
cirrhosis type (HCC)  
  
  
Liver mass  
  
  
Splenomegaly  
  
  
  
Procedures  
  
  
MRI LIVER WO/W IVCON  
  
  
MRI ABDOMEN W/O & W/CONTRAST   
MATERIAL                                  
  
  
Maurizio Cochran PA-C  
  
  
[address] [place] [id number]  
  
  
Phone: [phone]  
  
  
Fax: [phone]                         
  
  
[facility]  
  
  
  
                    Referral ID Status    Reason    Start Date Expiration Date V  
isits   
Requested                               Visits   
Authorized  
   
                                [id number]        Closed            
  
  
Auto-Generate  
d Referral      2023       1               1  
  
  
  
                                        Reason              Comments  
   
                                        Radiology MRI         
  
  
  
                          Specialty    Diagnoses / Procedures Referred By Contac  
t Referred To Contact  
   
                                        [facility]            
  
  
Diagnoses  
  
  
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   
ascites, unspecified hepatic   
cirrhosis type (HCC)  
  
  
Liver mass  
  
  
Splenomegaly  
  
  
  
Procedures  
  
  
MRI LIVER WO/W IVCON  
  
  
MRI ABDOMEN W/O & W/CONTRAST   
MATERIAL                                  
  
  
Maurizio Cochran PA-GEORGE  
  
  
[address] [place] [address]  
  
  
Phone: [phone]  
  
  
Fax: [phone]                         
  
  
[facility]  
  
  
  
                                        Reason              Comments  
   
                                        Established Patient 4cm hcc  
  
  
  
                                Reason          Onset Date      Comments  
   
                                SPP General - Treatment Referral 2023       
 [employer]  
   
                                Insurance Authorization 2023      EDUAR[employer] baires  
  
  
  
                                Reason          Onset Date      Comments  
   
                                Simulation Request Form 2023        
  
  
  
                                        Reason              Comments  
   
                                        Patient Education     
  
  
  
                                Reason          Onset Date      Comments  
   
                                Refill Request  2023        
  
  
  
                                Reason          Onset Date      Comments  
   
                                Refill Request  2023        
  
  
  
                                        Reason              Comments  
   
                                        Care Coordinator - Other   
  
  
  
                                        Reason              Comments  
   
                                        OT home health order   
  
  
  
                                        Reason              Comments  
   
                                        [facility] Order Request     
  
  
  
                                        Reason              Comments  
   
                                        [facility] POC         
  
  
  
                                        Reason              Comments  
   
                                        Physical Therapy Plan of Care   
  
  
  
                                        Reason              Comments  
   
                                        Established Patient   
  
  
  
                                Reason          Onset Date      Comments  
   
                                Refill Request  2023        
  
  
  
                    Referral ID Status    Reason    Start Date Expiration Date V  
isits   
Requested                               Visits   
Authorized  
   
                [id number] Authorized         2023 99      99  
  
  
  
                                        Reason              Comments  
   
                                        Established Patient 3 month follow up  
  
  
  
                                        Reason              Comments  
   
                                        Radiology US          
  
  
  
                          Specialty    Diagnoses / Procedures Referred By Contac  
t Referred To Contact  
   
                                        [facility]            
  
  
Diagnoses  
  
  
Monoclonal gammopathy  
  
  
Macrocytic anemia  
  
  
Thrombocytopenia (HCC)  
  
  
  
Procedures  
  
  
US ABD RT UPPER QUADRANT  
  
  
US ABDOMINAL REAL TIME   
W/IMAGE LIMITED                           
  
  
Tello Ortega, DO  
  
  
[address] [place] [address]  
  
  
Phone: [phone]  
  
  
Fax: [phone]                         
  
  
[facility]  
  
  
[place] [address]  
  
  
  
                    Referral ID Status    Reason    Start Date Expiration Date V  
isits   
Requested                               Visits   
Authorized  
   
                                [id number]        Closed            
  
  
Auto-Generate  
d Referral      2022        1               1  
  
  
  
                          Specialty    Diagnoses / Procedures Referred By Contac  
t Referred To Contact  
   
                                        MR IMAGING            
  
  
Diagnoses  
  
  
Liver disease  
  
  
  
Procedures  
  
  
MRI LIVER WO/W IVCON  
  
  
MRI ABDOMEN W/O &   
W/CONTRAST MATERIAL                       
  
  
Marin Fish MD  
  
  
[address] [place] [address]  
  
  
Phone: [phone]  
  
  
Fax: [phone]                         
  
  
[facility]  
  
  
[place] [address]  
  
  
  
                    Referral ID Status    Reason    Start Date Expiration Date V  
isits   
Requested                               Visits   
Authorized  
   
                                [id number]        Closed            
  
  
Auto-Generate  
d Referral      2023       1               1  
  
  
  
                                        Reason              Comments  
   
                                        Appointment Confirmation   
  
  
  
  
  
                                                    Care Teams (unrecognized sec  
tion and content)  
   
                                          
  
  
  
                                          
   
                                          
  
  
  
                      Team Member Relationship Specialty  Start Date End Date  
   
                                                      
  
  
Júnior Ahuja MD  
  
  
[address] [place], [place] [address] [phone] (Work)  
  
  
[phone] (Fax) PCP - General                   10/22/02          
  
  
  
                      Team Member Relationship Specialty  Start Date End Date  
   
                                                      
  
  
Júnior Ahuja MD  
  
  
[address] [place], [place] [id number] [phone] (Work)  
  
  
[phone] (Fax) PCP - General                   10/22/02          
  
  
  
                      Team Member Relationship Specialty  Start Date End Date  
   
                                                      
  
  
Júnior Ahuja MD  
  
  
[address] [place], [place] [id number] [phone] (Work)  
  
  
[phone] (Fax) PCP - General                   10/22/02          
  
  
  
                      Team Member Relationship Specialty  Start Date End Date  
   
                                                      
  
  
Júnior Ahuja MD  
  
  
[address] [place] [address]  
  
  
[phone] (Work)  
  
  
[phone] (Fax) PCP - General                   10/22/02          
  
  
  
                      Team Member Relationship Specialty  Start Date End Date  
   
                                                      
  
  
Júnior Ahuja MD  
  
  
[address] [place] [address]  
  
  
[phone] (Work)  
  
  
[phone] (Fax) PCP - General                   10/22/02          
   
                                                      
  
  
Júnior Ahuja MD  
  
  
[address] [place] [address]  
  
  
[phone] (Work)  
  
  
[phone] (Fax) Referring       Internal Medicine 22           
   
                                                      
  
  
Júnior Ahuja MD  
  
  
[address] [place] [address]  
  
  
[phone] (Work)  
  
  
[phone] (Fax) Home Care Physician Internal Medicine 22           
  
  
  
                      Team Member Relationship Specialty  Start Date End Date  
   
                                                      
  
  
Júnior Ahuja MD  
  
  
[address] [place] [address]  
  
  
[phone] (Work)  
  
  
[phone] (Fax) PCP - General                   10/22/02          
   
                                                      
  
  
Júnior Ahuja MD  
  
  
[address] [place] [address]  
  
  
[phone] (Work)  
  
  
[phone] (Fax) Referring       Internal Medicine 22           
   
                                                      
  
  
Júnior Ahuja MD  
  
  
[address] [place] [address]  
  
  
[phone] (Work)  
  
  
[phone] (Fax) Home Care Physician Internal Medicine 22           
  
  
  
                      Team Member Relationship Specialty  Start Date End Date  
   
                                                      
  
  
Júnior Ahuja MD  
  
  
[address] [place] [address]  
  
  
[phone] (Work)  
  
  
[phone] (Fax) PCP - General                   10/22/02          
   
                                                      
  
  
Júnior Ahuja MD  
  
  
[address] [place] [address]  
  
  
[phone] (Work)  
  
  
[phone] (Fax) Referring       Internal Medicine 22           
   
                                                      
  
  
Júnior Ahuja MD  
  
  
[address] [place] [address]  
  
  
[phone] (Work)  
  
  
[phone] (Fax) Home Care Physician Internal Medicine 22           
  
  
  
                      Team Member Relationship Specialty  Start Date End Date  
   
                                                      
  
  
Júnior Ahuja MD  
  
  
[address] [place] [address]  
  
  
[phone] (Work)  
  
  
[phone] (Fax) PCP - General                   10/22/02          
   
                                                      
  
  
Júnior Ahuja MD  
  
  
[address] [place] [address]  
  
  
[phone] (Work)  
  
  
[phone] (Fax) Referring       Internal Medicine 22           
   
                                                      
  
  
Júnior Ahuja MD  
  
  
[address] [place] [address]  
  
  
[phone] (Work)  
  
  
[phone] (Fax) Home Care Physician Internal Medicine 22           
   
                                                      
  
  
Brenda Hernandez,   
PT  
  
  
[address] [place] [address]  
  
  
[phone] (Work) Home Care  Acute Care 22           
  
  
  
                      Team Member Relationship Specialty  Start Date End Date  
   
                                                      
  
  
Júnior Ahuja MD  
  
  
[address] [place] [address]  
  
  
[phone] (Work)  
  
  
[phone] (Fax) PCP - General                   10/22/02          
   
                                                      
  
  
Júnior Ahuja MD  
  
  
[address] [place] [address]  
  
  
[phone] (Work)  
  
  
[phone] (Fax) Referring       Internal Medicine 22           
   
                                                      
  
  
Júnior Ahuja MD  
  
  
[address] [place] [address]  
  
  
[phone] (Work)  
  
  
[phone] (Fax) Home Care Physician Internal Medicine 22           
   
                                                      
  
  
Brenda Hernandez,   
PT  
  
  
[address] [place] [address]  
  
  
[phone] (Work) Home Care  Acute Care 22           
  
  
  
                      Team Member Relationship Specialty  Start Date End Date  
   
                                                      
  
  
Júnior Ahuja MD  
  
  
[address] [place] [address]  
  
  
[phone] (Work)  
  
  
[phone] (Fax) PCP - General                   10/22/02          
   
                                                      
  
  
Júnior Ahuja MD  
  
  
[address] [place] [address]  
  
  
[phone] (Work)  
  
  
[phone] (Fax) Referring       Internal Medicine 22           
   
                                                      
  
  
Júnior Ahuja MD  
  
  
[address] [place] [address]  
  
  
[phone] (Work)  
  
  
[phone] (Fax) Home Care Physician Internal Medicine 22           
   
                                                      
  
  
Brenda Hernandez,   
PT  
  
  
[address] [place] [address]  
  
  
[phone] (Work) Home Care  Acute Care 22           
  
  
  
                      Team Member Relationship Specialty  Start Date End Date  
   
                                                      
  
  
Júnior Ahuja MD  
  
  
[address] [place] [address]  
  
  
[phone] (Work)  
  
  
[phone] (Fax) PCP - General                   10/22/02          
   
                                                      
  
  
Júnior Ahuja MD  
  
  
[address] [place] [address]  
  
  
[phone] (Work)  
  
  
[phone] (Fax) Referring       Internal Medicine 22           
   
                                                      
  
  
Júnior Ahuja MD  
  
  
[address] [place] [address]  
  
  
[phone] (Work)  
  
  
[phone] (Fax) Home Care Physician Internal Medicine 22           
   
                                                      
  
  
Brenda Hernandez,   
PT  
  
  
[address] [place] [address]  
  
  
[phone] (Work) Home Care  Acute Care 22           
  
  
  
                      Team Member Relationship Specialty  Start Date End Date  
   
                                                      
  
  
Júnior Ahuja MD  
  
  
[address] [place] [address]  
  
  
[phone] (Work)  
  
  
[phone] (Fax) PCP - General                   10/22/02          
   
                                                      
  
  
Júnior Ahuja MD  
  
  
[address] [place] [address]  
  
  
[phone] (Work)  
  
  
[phone] (Fax) Referring       Internal Medicine 22           
   
                                                      
  
  
Júnior Ahuja MD  
  
  
[address] [place] [address]  
  
  
[phone] (Work)  
  
  
[phone] (Fax) Home Care Physician Internal Medicine 22           
   
                                                      
  
  
Brenda Hernandez,   
PT  
  
  
[address] [place] [address]  
  
  
[phone] (Work) Home Care  Acute Care 22           
  
  
  
                      Team Member Relationship Specialty  Start Date End Date  
   
                                                      
  
  
Júnior Ahuja MD  
  
  
[address] [place] [address]  
  
  
[phone] (Work)  
  
  
[phone] (Fax) PCP - General                   10/22/02          
   
                                                      
  
  
Júnior Ahuja MD  
  
  
[address] [place] [address]  
  
  
[phone] (Work)  
  
  
[phone] (Fax) Referring       Internal Medicine 22           
   
                                                      
  
  
Júnior Ahuja MD  
  
  
[address] [place] [address]  
  
  
[phone] (Work)  
  
  
[phone] (Fax) Home Care Physician Internal Medicine 22           
   
                                                      
  
  
Brenda Hernandez,   
PT  
  
  
[address] [place] [address]  
  
  
[phone] (Work) Home Care  Acute Care 22           
  
  
  
                      Team Member Relationship Specialty  Start Date End Date  
   
                                                      
  
  
Júnior Ahuja MD  
  
  
[address] [place] [address]  
  
  
[phone] (Work)  
  
  
[phone] (Fax) PCP - General                   10/22/02          
   
                                                      
  
  
Júnior Ahuja MD  
  
  
[address] [place] [address]  
  
  
[phone] (Work)  
  
  
[phone] (Fax) Referring       Internal Medicine 22           
   
                                                      
  
  
Júnior Ahuja MD  
  
  
[address] [place] [address]  
  
  
[phone] (Work)  
  
  
[phone] (Fax) Home Care Provider Internal Medicine 22           
   
                                                      
  
  
Brenda Hernandez,   
PT  
  
  
[address] [place] [address]  
  
  
[phone] (Work) Home Care  Acute Care 22           
  
  
  
                      Team Member Relationship Specialty  Start Date End Date  
   
                                                      
  
  
Júnior Ahuja MD  
  
  
[address] [place] [address]  
  
  
[phone] (Work)  
  
  
[phone] (Fax) PCP - General                   10/22/02          
   
                                                      
  
  
Júnior Ahuja MD  
  
  
[address] [place] [address]  
  
  
[phone] (Work)  
  
  
[phone] (Fax) Referring       Internal Medicine 22           
   
                                                      
  
  
Júnior Ahuja MD  
  
  
[address] [place] [address]  
  
  
[phone] (Work)  
  
  
[phone] (Fax) Home Care Provider Internal Medicine 22           
   
                                                      
  
  
Brenda Hernandez,   
PT  
  
  
[address] [place] [address]  
  
  
[phone] (Work) Home Care  Acute Care 22           
  
  
  
                      Team Member Relationship Specialty  Start Date End Date  
   
                                                      
  
  
Júnior Ahuja MD  
  
  
[address] [place] [address]  
  
  
[phone] (Work)  
  
  
[phone] (Fax) PCP - General                   10/22/02          
   
                                                      
  
  
Júnior Ahuja MD  
  
  
[address] [place] [address]  
  
  
[phone] (Work)  
  
  
[phone] (Fax) Referring       Internal Medicine 22           
   
                                                      
  
  
Júnior Ahuja MD  
  
  
[address] [place] [address]  
  
  
[phone] (Work)  
  
  
[phone] (Fax) Home Care Provider Internal Medicine 22           
   
                                                      
  
  
Brenda Hernandez,   
PT  
  
  
[address] [place] [address]  
  
  
[phone] (Work) Home Care  Acute Care 22           
  
  
  
                      Team Member Relationship Specialty  Start Date End Date  
   
                                                      
  
  
Júnior Ahuja MD  
  
  
[address] [place] [address]  
  
  
[phone] (Work)  
  
  
[phone] (Fax) PCP - General                   10/22/02          
   
                                                      
  
  
Júnior Ahuja MD  
  
  
[address] [place] [address]  
  
  
[phone] (Work)  
  
  
[phone] (Fax) Referring       Internal Medicine 22           
   
                                                      
  
  
Júnior Ahuja MD  
  
  
[address] [place] [address]  
  
  
[phone] (Work)  
  
  
[phone] (Fax) Home Care Provider Internal Medicine 22           
   
                                                      
  
  
Brenda Hernandez,   
PT  
  
  
[address] [place] [address]  
  
  
[phone] (Work) Home Care  Acute Care 22           
  
  
  
                      Team Member Relationship Specialty  Start Date End Date  
   
                                                      
  
  
Júnior Ahuja MD  
  
  
[address] [place] [address]  
  
  
[phone] (Work)  
  
  
[phone] (Fax) PCP - General                   10/22/02          
   
                                                      
  
  
Júnior Ahuja MD  
  
  
[address] [place] [address]  
  
  
[phone] (Work)  
  
  
[phone] (Fax) Referring       Internal Medicine 22           
   
                                                      
  
  
Júnior Ahuja MD  
  
  
[address] [place] [address]  
  
  
[phone] (Work)  
  
  
[phone] (Fax) Home Care Provider Internal Medicine 22           
   
                                                      
  
  
Brenda Hernandez,   
PT  
  
  
[address] [place] [address]  
  
  
[phone] (Work) Home Care  Acute Care 22           
  
  
  
                      Team Member Relationship Specialty  Start Date End Date  
   
                                                      
  
  
Júnior Ahuja MD  
  
  
[address] [place] [address]  
  
  
[phone] (Work)  
  
  
[phone] (Fax) PCP - General                   10/22/02          
   
                                                      
  
  
Júnior Ahuja MD  
  
  
[address] [place] [address]  
  
  
[phone] (Work)  
  
  
[phone] (Fax) Referring       Internal Medicine 22           
   
                                                      
  
  
Júnior Ahuja MD  
  
  
[address] [place] [address]  
  
  
[phone] (Work)  
  
  
[phone] (Fax) Home Care Provider Internal Medicine 22           
   
                                                      
  
  
Brenda Hernandez,   
PT  
  
  
[address] [place] [address]  
  
  
[phone] (Work) Home Care  Acute Care 22           
  
  
  
                      Team Member Relationship Specialty  Start Date End Date  
   
                                                      
  
  
Júnior Auhja MD  
  
  
[address] [place] [address]  
  
  
[phone] (Work)  
  
  
[phone] (Fax) PCP - General                   10/22/02          
   
                                                      
  
  
Júnior Ahuja MD  
  
  
[address] [place] [address]  
  
  
[phone] (Work)  
  
  
[phone] (Fax) Referring       Internal Medicine 22           
   
                                                      
  
  
Júnior Ahuja MD  
  
  
[address] [place] [address]  
  
  
[phone] (Work)  
  
  
[phone] (Fax) Home Care Provider Internal Medicine 22           
   
                                                      
  
  
Brenda Hernandez,   
PT  
  
  
[address] [place] [address]  
  
  
[phone] (Work) Home Care  Acute Care 22           
  
  
  
                      Team Member Relationship Specialty  Start Date End Date  
   
                                                      
  
  
Júnior Ahuja MD  
  
  
[address] [place] [address]  
  
  
[phone] (Work)  
  
  
[phone] (Fax) PCP - General                   10/22/02          
   
                                                      
  
  
Júnior Ahuja MD  
  
  
[address] [place] [address]  
  
  
[phone] (Work)  
  
  
[phone] (Fax) Referring       Internal Medicine 22           
   
                                                      
  
  
Júnior Ahuja MD  
  
  
[address] [place] [address]  
  
  
[phone] (Work)  
  
  
[phone] (Fax) Home Care Provider Internal Medicine 22           
   
                                                      
  
  
Brenda Hernandez,   
PT  
  
  
[address] [place] [address]  
  
  
[phone] (Work) Home Care  Acute Care 22           
  
  
  
                      Team Member Relationship Specialty  Start Date End Date  
   
                                                      
  
  
Júnior Ahuja MD  
  
  
[address] [place] [address]  
  
  
[phone] (Work)  
  
  
[phone] (Fax) PCP - General                   10/22/02          
   
                                                      
  
  
Júnior Ahuja MD  
  
  
[address] [place] [address]  
  
  
[phone] (Work)  
  
  
[phone] (Fax) Referring       Internal Medicine 22           
   
                                                      
  
  
Júnior Ahuja MD  
  
  
[address] [place] [address]  
  
  
[phone] (Work)  
  
  
[phone] (Fax) Home Care Provider Internal Medicine 22           
   
                                                      
  
  
Brenda Hernandez,   
PT  
  
  
[address] [place] [address]  
  
  
[phone] (Work) Home Care  Acute Care 22           
  
  
  
                      Team Member Relationship Specialty  Start Date End Date  
   
                                                      
  
  
Júnior Ahuja MD  
  
  
[address] [place] [address]  
  
  
[phone] (Work)  
  
  
[phone] (Fax) PCP - General                   10/22/02          
   
                                                      
  
  
Júnior Ahuja MD  
  
  
[address] [place] [address]  
  
  
[phone] (Work)  
  
  
[phone] (Fax) Referring       Internal Medicine 22           
   
                                                      
  
  
Júnior Ahuja MD  
  
  
[address] [place] [address]  
  
  
[phone] (Work)  
  
  
[phone] (Fax) Home Care Provider Internal Medicine 22           
   
                                                      
  
  
Brenda Hernandez,   
PT  
  
  
[address] [place] [address]  
  
  
[phone] (Work) Home Care  Acute Care 22           
  
  
  
                      Team Member Relationship Specialty  Start Date End Date  
   
                                                      
  
  
Júnior Ahuja MD  
  
  
[address] [place] [address]  
  
  
[phone] (Work)  
  
  
[phone] (Fax) PCP - General                   10/22/02          
   
                                                      
  
  
Júnior Ahuja MD  
  
  
[address] [place] [address]  
  
  
[phone] (Work)  
  
  
[phone] (Fax) Referring       Internal Medicine 22           
   
                                                      
  
  
Júnior Ahuja MD  
  
  
[address] [place] [address]  
  
  
[phone] (Work)  
  
  
[phone] (Fax) Home Care Provider Internal Medicine 22           
   
                                                      
  
  
Brenda Hernandez,   
PT  
  
  
[address] [place] [address]  
  
  
[phone] (Work) Home Care  Acute Care 22           
  
  
  
                      Team Member Relationship Specialty  Start Date End Date  
   
                                                      
  
  
Júnior Ahuja MD  
  
  
[address] [place] [address]  
  
  
[phone] (Work)  
  
  
[phone] (Fax) PCP - General                   10/22/02          
   
                                                      
  
  
Júnior Ahuja MD  
  
  
[address] [place] [address]  
  
  
[phone] (Work)  
  
  
[phone] (Fax) Referring       Internal Medicine 22           
   
                                                      
  
  
Júnior Ahuja MD  
  
  
[address] [place] [address]  
  
  
[phone] (Work)  
  
  
[phone] (Fax) Home Care Provider Internal Medicine 22           
   
                                                      
  
  
Brenda Hernandez,   
PT  
  
  
[address] [place] [address]  
  
  
[phone] (Work) Home Care  Acute Care 22           
  
  
  
                      Team Member Relationship Specialty  Start Date End Date  
   
                                                      
  
  
Júnior Ahuja MD  
  
  
[address] [place] [address]  
  
  
[phone] (Work)  
  
  
[phone] (Fax) PCP - General                   10/22/02          
   
                                                      
  
  
Júnior Ahuja MD  
  
  
[address] [place] [address]  
  
  
[phone] (Work)  
  
  
[phone] (Fax) Referring       Internal Medicine 22           
   
                                                      
  
  
Júnior Ahuja MD  
  
  
[address] [place] [address]  
  
  
[phone] (Work)  
  
  
[phone] (Fax) Home Care Provider Internal Medicine 22           
   
                                                      
  
  
Brenda Hernandez,   
PT  
  
  
[address] [place] [address]  
  
  
[phone] (Work) Home Care  Acute Care 22           
  
  
  
                      Team Member Relationship Specialty  Start Date End Date  
   
                                                      
  
  
Júnior Ahuja MD  
  
  
[address] [place] [address]  
  
  
[phone] (Work)  
  
  
[phone] (Fax) PCP - General                   10/22/02          
   
                                                      
  
  
Júnior Ahuja MD  
  
  
[address] [place] [address]  
  
  
[phone] (Work)  
  
  
[phone] (Fax) Referring       Internal Medicine 22           
   
                                                      
  
  
Júnior Ahuja MD  
  
  
[address] [place] [address]  
  
  
[phone] (Work)  
  
  
[phone] (Fax) Home Care Provider Internal Medicine 22           
   
                                                      
  
  
Brenda Hernandez,   
PT  
  
  
[address] [place] [address]  
  
  
[phone] (Work) Home Care  Acute Care 22           
  
  
  
                      Team Member Relationship Specialty  Start Date End Date  
   
                                                      
  
  
Júnior Ahuja MD  
  
  
[address] [place] [address]  
  
  
[phone] (Work)  
  
  
[phone] (Fax) PCP - General                   10/22/02          
   
                                                      
  
  
Júnior Ahuja MD  
  
  
[address] [place] [address]  
  
  
[phone] (Work)  
  
  
[phone] (Fax) Referring       Internal Medicine 22           
   
                                                      
  
  
Júnior Ahuja MD  
  
  
[address] [place] [address]  
  
  
[phone] (Work)  
  
  
[phone] (Fax) Home Care Provider Internal Medicine 22           
   
                                                      
  
  
Brenda Hernandez,   
PT  
  
  
[address] [place] [address]  
  
  
[phone] (Work) Home Care  Acute Care 22           
  
  
  
                      Team Member Relationship Specialty  Start Date End Date  
   
                                                      
  
  
Júnior Ahuja MD  
  
  
[address] [place] [address]  
  
  
[phone] (Work)  
  
  
[phone] (Fax) PCP - General                   10/22/02          
   
                                                      
  
  
Júnior Ahuja MD  
  
  
[address] [place] [address]  
  
  
[phone] (Work)  
  
  
[phone] (Fax) Referring       Internal Medicine 22           
   
                                                      
  
  
Júnior Ahuja MD  
  
  
[address] [place] [address]  
  
  
[phone] (Work)  
  
  
[phone] (Fax) Home Care Provider Internal Medicine 22           
   
                                                      
  
  
Brenda Hernandez,   
PT  
  
  
[address] [place] [address]  
  
  
[phone] (Work) Home Care  Acute Care 22           
  
  
  
                      Team Member Relationship Specialty  Start Date End Date  
   
                                                      
  
  
Júnior Ahuja MD  
  
  
[address] [place] [address]  
  
  
[phone] (Work)  
  
  
[phone] (Fax) PCP - General                   10/22/02          
   
                                                      
  
  
Júnior Ahuja MD  
  
  
[address] [place] [address]  
  
  
[phone] (Work)  
  
  
[phone] (Fax) Referring       Internal Medicine 22           
   
                                                      
  
  
Júnior Ahuja MD  
  
  
[address] [place] [address]  
  
  
[phone] (Work)  
  
  
[phone] (Fax) Home Care Provider Internal Medicine 22           
   
                                                      
  
  
Brenda Hernandez,   
PT  
  
  
[address] [place] [address]  
  
  
[phone] (Work) Home Care  Acute Care 22           
  
  
  
                      Team Member Relationship Specialty  Start Date End Date  
   
                                                      
  
  
Júnior Ahuja MD  
  
  
[address] [place] [address]  
  
  
[phone] (Work)  
  
  
[phone] (Fax) PCP - General                   10/22/02          
   
                                                      
  
  
Júnior Ahuja MD  
  
  
[address] [place] [address]  
  
  
[phone] (Work)  
  
  
[phone] (Fax) Referring       Internal Medicine 22           
   
                                                      
  
  
Júnior Ahuja MD  
  
  
[address] [place] [address]  
  
  
[phone] (Work)  
  
  
[phone] (Fax) Home Care Provider Internal Medicine 22           
   
                                                      
  
  
Brenda Hernandez,   
PT  
  
  
[address] [place] [address]  
  
  
[phone] (Work) Home Care  Acute Care 22           
  
  
  
                      Team Member Relationship Specialty  Start Date End Date  
   
                                                      
  
  
Júnior Ahuja MD  
  
  
[address] [place] [address]  
  
  
[phone] (Work)  
  
  
[phone] (Fax) PCP - General                   10/22/02          
   
                                                      
  
  
Júnior Ahuja MD  
  
  
[address] [place] [address]  
  
  
[phone] (Work)  
  
  
[phone] (Fax) Referring       Internal Medicine 22           
   
                                                      
  
  
Júnior Ahuja MD  
  
  
[address] [place] [address]  
  
  
[phone] (Work)  
  
  
[phone] (Fax) Home Care Provider Internal Medicine 22           
   
                                                      
  
  
Brenda Hernandez,   
PT  
  
  
[address] [place] [address]  
  
  
[phone] (Work) Home Care  Acute Care 22           
  
  
  
                      Team Member Relationship Specialty  Start Date End Date  
   
                                                      
  
  
Júnior Ahuja MD  
  
  
[address] [place] [address]  
  
  
[phone] (Work)  
  
  
[phone] (Fax) PCP - General                   10/22/02          
   
                                                      
  
  
Júnior Ahuja MD  
  
  
[address] [place] [address]  
  
  
[phone] (Work)  
  
  
[phone] (Fax) Referring       Internal Medicine 22           
   
                                                      
  
  
Júnior Ahuja MD  
  
  
[address] [place] [address]  
  
  
[phone] (Work)  
  
  
[phone] (Fax) Home Care Provider Internal Medicine 22           
   
                                                      
  
  
Brenda Hernandez,   
PT  
  
  
[address] [place] [address]  
  
  
[phone] (Work) Home Care  Acute Care 22           
  
  
  
                      Team Member Relationship Specialty  Start Date End Date  
   
                                                      
  
  
Júnior Ahuja MD  
  
  
[address] [place] [address]  
  
  
[phone] (Work)  
  
  
[phone] (Fax) PCP - General                   10/22/02          
   
                                                      
  
  
Júnior Ahuja MD  
  
  
[address] [place] [address]  
  
  
[phone] (Work)  
  
  
[phone] (Fax) Referring       Internal Medicine 22           
   
                                                      
  
  
Júnior Ahuja MD  
  
  
[address] [place] [address]  
  
  
[phone] (Work)  
  
  
[phone] (Fax) Home Care Provider Internal Medicine 22           
   
                                                      
  
  
Brenda Hernandez,   
PT  
  
  
[address] [place] [address]  
  
  
[phone] (Work) Home Care  Acute Care 22           
  
  
  
                      Team Member Relationship Specialty  Start Date End Date  
   
                                                      
  
  
Júnior Ahuja MD  
  
  
[address] [place] [address]  
  
  
[phone] (Work)  
  
  
[phone] (Fax) PCP - General                   10/22/02          
   
                                                      
  
  
Júnior Ahuja MD  
  
  
[address] [place] [address]  
  
  
[phone] (Work)  
  
  
[phone] (Fax) Referring       Internal Medicine 22           
   
                                                      
  
  
Júnior Ahuja MD  
  
  
[address] [place] [address]  
  
  
[phone] (Work)  
  
  
[phone] (Fax) Home Care Provider Internal Medicine 22           
   
                                                      
  
  
Brenda Hernandez,   
PT  
  
  
[address] [place] [address]  
  
  
[phone] (Work) Home Care  Acute Care 22           
  
  
  
                      Team Member Relationship Specialty  Start Date End Date  
   
                                                      
  
  
Júnior Ahuja MD  
  
  
[address] [place] [address]  
  
  
[phone] (Work)  
  
  
[phone] (Fax) PCP - General                   10/22/02          
   
                                                      
  
  
Júnior Ahuja MD  
  
  
[address] [place] [address]  
  
  
[phone] (Work)  
  
  
[phone] (Fax) Referring       Internal Medicine 22           
   
                                                      
  
  
Júnior Ahuja MD  
  
  
[address] [place] [address]  
  
  
[phone] (Work)  
  
  
[phone] (Fax) Home Care Provider Internal Medicine 22           
   
                                                      
  
  
Brenda Hernandez,   
PT  
  
  
[address] [place] [address]  
  
  
[phone] (Work) Home Care  Acute Care 22           
  
  
  
                      Team Member Relationship Specialty  Start Date End Date  
   
                                                      
  
  
Júnior Ahuja MD  
  
  
[address] [place] [address]  
  
  
[phone] (Work)  
  
  
[phone] (Fax) PCP - General                   10/22/02          
   
                                                      
  
  
Júnior Ahuja MD  
  
  
[address] [place] [address]  
  
  
[phone] (Work)  
  
  
[phone] (Fax) Referring       Internal Medicine 22           
   
                                                      
  
  
Júnior Ahuja MD  
  
  
[address] [place] [address]  
  
  
[phone] (Work)  
  
  
[phone] (Fax) Home Care Provider Internal Medicine 22           
   
                                                      
  
  
Brenda Hernandez,   
PT  
  
  
[address] [place] [address]  
  
  
[phone] (Work) Home Care  Acute Care 22           
   
                                                      
  
  
Maurizio Cochran PA-C  
  
  
[address]  
  
  
[place] [address]  
  
  
[phone] (Work)  
  
  
[phone] (Fax)                 Gastroenterology 23           
   
                                                      
  
  
Nathalia Bernal PA-C  
  
  
[address] [place] [address]  
  
  
[phone] (Work)  
  
  
[phone] (Fax)                 General Surgery 23           
  
  
  
                      Team Member Relationship Specialty  Start Date End Date  
   
                                                      
  
  
Júnior Ahuja MD  
  
  
[address] [place] [address]  
  
  
[phone] (Work)  
  
  
[phone] (Fax) PCP - General                   10/22/02          
   
                                                      
  
  
Júnior Ahuja MD  
  
  
[address] [place] [address]  
  
  
[phone] (Work)  
  
  
[phone] (Fax) Referring       Internal Medicine 22           
   
                                                      
  
  
Júnior Ahuja MD  
  
  
[address] [place] [address]  
  
  
[phone] (Work)  
  
  
[phone] (Fax) Home Care Provider Internal Medicine 22           
   
                                                      
  
  
Brenda Hernandez,   
PT  
  
  
[address] [place] [address]  
  
  
[phone] (Work) Home Care  Acute Care 22           
   
                                                      
  
  
Maurizio Cochran PA-C  
  
  
[address] [place] [address]  
  
  
[phone] (Work)  
  
  
[phone] (Fax)                 Gastroenterology 23           
   
                                                      
  
  
Nathalia Bernal PA-C  
  
  
[address] [facility] [place] [place]  
  
  
[place] [address]  
  
  
[phone] (Work)  
  
  
[phone] (Fax)                 General Surgery 23           
  
  
  
                      Team Member Relationship Specialty  Start Date End Date  
   
                                                      
  
  
Júnior Ahuja MD  
  
  
 [facility] [address]  
  
  
[phone] (Work)  
  
  
[phone] (Fax) PCP - General                   10/22/02          
   
                                                      
  
  
Júnior Ahuja MD  
  
  
 [place] [address]  
  
  
[phone] (Work)  
  
  
[phone] (Fax) Referring       Internal Medicine 22           
   
                                                      
  
  
Júnior Ahuja MD  
  
  
[address] [place] [address]  
  
  
[phone] (Work)  
  
  
[phone] (Fax) Home Care Provider Internal Medicine 22           
   
                                                      
  
  
Brenda Hernandez,   
PT  
  
  
[address] [place] [address]  
  
  
[phone] (Work) Home Care  Acute Care 22           
   
                                                      
  
  
Maurizio Cochran PA-C  
  
  
[address] [place] [address]  
  
  
[phone] (Work)  
  
  
[phone] (Fax)                 Gastroenterology 23           
   
                                                      
  
  
Nathalia Bernal PA-C  
  
  
[address]  
  
  
[place] [address]  
  
  
[phone] (Work)  
  
  
[phone] (Fax)                 General Surgery 23           
  
  
  
                      Team Member Relationship Specialty  Start Date End Date  
   
                                                      
  
  
Júnior Ahuja MD  
  
  
[address] [place] [address]  
  
  
[phone] (Work)  
  
  
[phone] (Fax) PCP - General                   10/22/02          
   
                                                      
  
  
Júnior Ahuja MD  
  
  
[address] [place] [address]  
  
  
[phone] (Work)  
  
  
[phone] (Fax) Referring       Internal Medicine 22           
   
                                                      
  
  
Júnior Ahuja MD  
  
  
[address] [place] [address]  
  
  
[phone] (Work)  
  
  
[phone] (Fax) Home Care Provider Internal Medicine 22           
   
                                                      
  
  
Brenda Hernandez,   
PT  
  
  
[address] [place] [address]  
  
  
[phone] (Work) Home Care  Acute Care 22           
   
                                                      
  
  
Maurizio Cochran PA-C  
  
  
[address] [place] [address]  
  
  
[phone] (Work)  
  
  
[phone] (Fax)                 Gastroenterology 23           
   
                                                      
  
  
Nathalia Bernal PA-C  
  
  
[address]  
  
  
[place] [address]  
  
  
[phone] (Work)  
  
  
[phone] (Fax)                 General Surgery 23           
  
  
  
                      Team Member Relationship Specialty  Start Date End Date  
   
                                                      
  
  
Júnior Ahuja MD  
  
  
[address] [place] [address]  
  
  
[phone] (Work)  
  
  
[phone] (Fax) PCP - General                   10/22/02          
   
                                                      
  
  
Júnior Ahuja MD  
  
  
[address] [place] [address]  
  
  
[phone] (Work)  
  
  
[phone] (Fax) Referring       Internal Medicine 22           
   
                                                      
  
  
Júnior Ahuja MD  
  
  
[address] [place] [address]  
  
  
[phone] (Work)  
  
  
[phone] (Fax) Home Care Provider Internal Medicine 22           
   
                                                      
  
  
Brenda Hernandez,   
PT  
  
  
[address] [place] [address]  
  
  
[phone] (Work) Home Care  Acute Care 22           
   
                                                      
  
  
Maurizio Cochran PA-C  
  
  
[address] [place] [address]  
  
  
[phone] (Work)  
  
  
[phone] (Fax)                 Gastroenterology 23           
   
                                                      
  
  
Nathalia Bernal PA-C  
  
  
[address]  
  
  
[place] [address]  
  
  
[phone] (Work)  
  
  
[phone] (Fax)                 General Surgery 23           
  
  
  
                      Team Member Relationship Specialty  Start Date End Date  
   
                                                      
  
  
Júnior Ahuja MD  
  
  
[address] [place] [address]  
  
  
[phone] (Work)  
  
  
[phone] (Fax) PCP - General                   10/22/02          
   
                                                      
  
  
Júnior Ahuja MD  
  
  
[address] [place] [address]  
  
  
[phone] (Work)  
  
  
[phone] (Fax) Referring       Internal Medicine 22           
   
                                                      
  
  
Júnior Ahuja MD  
  
  
[address] [place] [address]  
  
  
[phone] (Work)  
  
  
[phone] (Fax) Home Care Provider Internal Medicine 22           
   
                                                      
  
  
Brenda Hernandez,   
PT  
  
  
[address] [place] [address]  
  
  
[phone] (Work) Home Care  Acute Care 22           
   
                                                      
  
  
Maurizio Cochran PA-C  
  
  
[address] [place] [address]  
  
  
[phone] (Work)  
  
  
[phone] (Fax)                 Gastroenterology 23           
   
                                                      
  
  
Nathalia Bernal PA-C  
  
  
[address]  
  
  
[place] [address]  
  
  
[phone] (Work)  
  
  
[phone] (Fax)                 General Surgery 23           
  
  
  
                      Team Member Relationship Specialty  Start Date End Date  
   
                                                      
  
  
Júnior Ahuja MD  
  
  
[address] [place] [address]  
  
  
[phone] (Work)  
  
  
[phone] (Fax) PCP - General                   10/22/02          
   
                                                      
  
  
Júnior Ahuja MD  
  
  
[address] [place] [address]  
  
  
[phone] (Work)  
  
  
[phone] (Fax) Referring       Internal Medicine 22           
   
                                                      
  
  
Júnior Ahuja MD  
  
  
[address] [place] [address]  
  
  
[phone] (Work)  
  
  
[phone] (Fax) Home Care Provider Internal Medicine 22           
   
                                                      
  
  
Brenda Hernandez,   
PT  
  
  
[address] [place] [address]  
  
  
[phone] (Work) Home Care  Acute Care 22           
   
                                                      
  
  
Maurizio Cochran PA-C  
  
  
[address] [place] [address]  
  
  
[phone] (Work)  
  
  
[phone] (Fax)                 Gastroenterology 23           
   
                                                      
  
  
Nathalia Bernal PA-C  
  
  
[address] [place] [address]  
  
  
[phone] (Work)  
  
  
[phone] (Fax)                 General Surgery 23           
  
  
  
                      Team Member Relationship Specialty  Start Date End Date  
   
                                                      
  
  
Júnior Ahuja MD  
  
  
[address] [place] [address]  
  
  
[phone] (Work)  
  
  
[phone] (Fax) PCP - General                   10/22/02          
   
                                                      
  
  
Júnior Ahuja MD  
  
  
[address] [place] [address]  
  
  
[phone] (Work)  
  
  
[phone] (Fax) Referring       Internal Medicine 22           
   
                                                      
  
  
Júnior Ahuja MD  
  
  
[address] [place] [address]  
  
  
[phone] (Work)  
  
  
[phone] (Fax) Home Care Provider Internal Medicine 22           
   
                                                      
  
  
Brenda Hernandez,   
PT  
  
  
[address] [place] [address]  
  
  
[phone] (Work) Home Care  Acute Care 22           
   
                                                      
  
  
Maurizio Cochran PA-C  
  
  
[address] [place] [address]  
  
  
[phone] (Work)  
  
  
[phone] (Fax)                 Gastroenterology 23           
   
                                                      
  
  
Nahtalia Bernal PA-C  
  
  
[address]  
  
  
[place] [address]  
  
  
[phone] (Work)  
  
  
[phone] (Fax)                 General Surgery 23           
  
  
  
                      Team Member Relationship Specialty  Start Date End Date  
   
                                                      
  
  
Júnior Ahuja MD  
  
  
[address] [place] [address]  
  
  
[phone] (Work)  
  
  
[phone] (Fax) PCP - General                   10/22/02          
   
                                                      
  
  
Júnior Ahuja MD  
  
  
[address] [place] [address]  
  
  
[phone] (Work)  
  
  
[phone] (Fax) Referring       Internal Medicine 22           
   
                                                      
  
  
Júnior Ahuja MD  
  
  
[address] [place] [address]  
  
  
[phone] (Work)  
  
  
[phone] (Fax) Home Care Provider Internal Medicine 22           
   
                                                      
  
  
Brenda Hernandez,   
PT  
  
  
[address] [place] [address]  
  
  
[phone] (Work) Home Care  Acute Care 22           
   
                                                      
  
  
Maurizio Cochran PA-C  
  
  
[address] [place] [address]  
  
  
[phone] (Work)  
  
  
[phone] (Fax)                 Gastroenterology 23           
   
                                                      
  
  
Nathalia Bernal PA-C  
  
  
[address] [place] [address]  
  
  
[phone] (Work)  
  
  
[phone] (Fax)                 General Surgery 23           
  
  
  
                      Team Member Relationship Specialty  Start Date End Date  
   
                                                      
  
  
Júnior Ahuja MD  
  
  
[address] [place] [address]  
  
  
[phone] (Work)  
  
  
[phone] (Fax) PCP - General                   10/22/02          
   
                                                      
  
  
Júnior Ahuja MD  
  
  
[address] [place] [address]  
  
  
[phone] (Work)  
  
  
[phone] (Fax) Referring       Internal Medicine 22           
   
                                                      
  
  
Júnior Ahuja MD  
  
  
[address] [place] [address]  
  
  
[phone] (Work)  
  
  
[phone] (Fax) Home Care Provider Internal Medicine 22           
   
                                                      
  
  
Brenda Hernandez,   
PT  
  
  
[address] [place] [address]  
  
  
[phone] (Work) Home Care  Acute Care 22           
   
                                                      
  
  
Maurizio Cochran PA-C  
  
  
[address] [place] [address]  
  
  
[phone] (Work)  
  
  
[phone] (Fax)                 Gastroenterology 23           
   
                                                      
  
  
Nathalia Bernal PA-C  
  
  
[address]  
  
  
[place] [address]  
  
  
[phone] (Work)  
  
  
[phone] (Fax)                 General Surgery 23           
  
  
  
                      Team Member Relationship Specialty  Start Date End Date  
   
                                                      
  
  
Júnior Ahuja MD  
  
  
[address] [place] [address]  
  
  
[phone] (Work)  
  
  
[phone] (Fax) PCP - General                   10/22/02          
   
                                                      
  
  
Júnior Ahuja MD  
  
  
[address] [place] [address]  
  
  
[phone] (Work)  
  
  
[phone] (Fax) Referring       Internal Medicine 22           
   
                                                      
  
  
Júnior Ahuja MD  
  
  
[address] [place] [address]  
  
  
[phone] (Work)  
  
  
[phone] (Fax) Home Care Provider Internal Medicine 22           
   
                                                      
  
  
Brenda Hernandez,   
PT  
  
  
[address] [place] [address]  
  
  
[phone] (Work) Home Care  Acute Care 22           
   
                                                      
  
  
Maurizio Cochran PA-C  
  
  
[address] [place], [place] [address]  
  
  
[phone] (Work)  
  
  
[phone] (Fax)                 Gastroenterology 23           
   
                                                      
  
  
Nathalia Bernal PA-C  
  
  
[address]  
  
  
[place] [address]  
  
  
[phone] (Work)  
  
  
[phone] (Fax)                 General Surgery 23           
  
  
  
                      Team Member Relationship Specialty  Start Date End Date  
   
                                                      
  
  
Júnior Ahuja MD  
  
  
[address] [place] [address]  
  
  
[phone] (Work)  
  
  
[phone] (Fax) PCP - General                   10/22/02          
   
                                                      
  
  
Júnior Ahuja MD  
  
  
[address] [place] [address]  
  
  
[phone] (Work)  
  
  
[phone] (Fax) Referring       Internal Medicine 22           
   
                                                      
  
  
Júnior Ahuja MD  
  
  
[address] [place] [address]  
  
  
[phone] (Work)  
  
  
[phone] (Fax) Home Care Provider Internal Medicine 22           
   
                                                      
  
  
Brenda Hernandez,   
PT  
  
  
[address] [place] [address]  
  
  
[phone] (Work) Home Care  Acute Care 22           
   
                                                      
  
  
Maurizio Cochran PA-C  
  
  
[address] [place] [address]  
  
  
[phone] (Work)  
  
  
[phone] (Fax)                 Gastroenterology 23           
   
                                                      
  
  
Nathalia Bernal PA-C  
  
  
[address] [place] [address]  
  
  
[phone] (Work)  
  
  
[phone] (Fax)                 General Surgery 23           
  
  
  
                      Team Member Relationship Specialty  Start Date End Date  
   
                                                      
  
  
Júnior Ahuja MD  
  
  
[address] [place] [address]  
  
  
[phone] (Work)  
  
  
[phone] (Fax) PCP - General                   10/22/02          
   
                                                      
  
  
Júnior Ahuja MD  
  
  
[address] [place] [address]  
  
  
[phone] (Work)  
  
  
[phone] (Fax) Referring       Internal Medicine 22           
   
                                                      
  
  
Júnior Ahuja MD  
  
  
[address] [place] [address]  
  
  
[phone] (Work)  
  
  
[phone] (Fax) Home Care Provider Internal Medicine 22           
   
                                                      
  
  
Brenda Hernandez,   
PT  
  
  
[address] [place] [address]  
  
  
[phone] (Work) Home Care  Acute Care 22           
   
                                                      
  
  
Maurizio Cochran PA-C  
  
  
[address] [place] [address]  
  
  
[phone] (Work)  
  
  
[phone] (Fax)                 Gastroenterology 23           
   
                                                      
  
  
Nathalia Bernal PA-C  
  
  
[address]  
  
  
[place] [address]  
  
  
[phone] (Work)  
  
  
[phone] (Fax)                 General Surgery 23           
  
  
  
                      Team Member Relationship Specialty  Start Date End Date  
   
                                                      
  
  
Júnior Ahuja MD  
  
  
[address] [place] [address]  
  
  
[phone] (Work)  
  
  
[phone] (Fax) PCP - General                   10/22/02          
   
                                                      
  
  
Júnior Ahuja MD  
  
  
[address] [place] [address]  
  
  
[phone] (Work)  
  
  
[phone] (Fax) Referring       Internal Medicine 22           
   
                                                      
  
  
Júnior Ahuja MD  
  
  
[address] [place] [address]  
  
  
[phone] (Work)  
  
  
[phone] (Fax) Home Care Provider Internal Medicine 22           
   
                                                      
  
  
Brenda Hernandez,   
PT  
  
  
[address] [place] [address]  
  
  
[phone] (Work) Home Care  Acute Care 22           
   
                                                      
  
  
Maurizio Cochran PA-C  
  
  
[address] [place] [address]  
  
  
[phone] (Work)  
  
  
[phone] (Fax)                 Gastroenterology 23           
   
                                                      
  
  
Nathalia Bernal PA-C  
  
  
[address]  
  
  
[place] [address]  
  
  
[phone] (Work)  
  
  
[phone] (Fax)                 General Surgery 23           
  
  
  
                      Team Member Relationship Specialty  Start Date End Date  
   
                                                      
  
  
Júnior Ahuja MD  
  
  
NPI: [id number]  
  
  
[address] [place] [address]  
  
  
[phone] (Work)  
  
  
[phone] (Fax) PCP - General                   10/22/02          
   
                                                      
  
  
Júnior Ahuja MD  
  
  
NPI: [id number]  
  
  
[address] [place] [address]  
  
  
[phone] (Work)  
  
  
[phone] (Fax) Referring       Internal Medicine 22           
   
                                                      
  
  
Júnior Ahuja MD  
  
  
NPI: [id number]  
  
  
[address] [place] [address]  
  
  
[phone] (Work)  
  
  
[phone] (Fax) Home Care Provider Internal Medicine 22           
   
                                                      
  
  
Brenda Hernandez,   
PT  
  
  
[address] [place] [address]  
  
  
[phone] (Work) Home Care  Acute Care 22           
   
                                                      
  
  
Maurizio Cochran PA-C  
  
  
NPI: [id number]  
  
  
[address] [place] [address]  
  
  
[phone] (Work)  
  
  
[phone] (Fax)                 Gastroenterology 23           
   
                                                      
  
  
Nathalia Bernal PA-C  
  
  
NPI: [id number]  
  
  
[address]  
  
  
[place] [address]  
  
  
[phone] (Work)  
  
  
[phone] (Fax)                 General Surgery 23           
  
  
  
                      Team Member Relationship Specialty  Start Date End Date  
   
                                                      
  
  
Júnior Ahuja MD  
  
  
NPI: [id number]  
  
  
[address] [place] [address]  
  
  
[phone] (Work)  
  
  
[phone] (Fax) PCP - General                   10/22/02          
   
                                                      
  
  
Júnior Ahuja MD  
  
  
NPI: [id number]  
  
  
[address] [place] [address]  
  
  
[phone] (Work)  
  
  
[phone] (Fax) Referring       Internal Medicine 22           
   
                                                      
  
  
Júnior Ahuja MD  
  
  
NPI: [id number]  
  
  
[address] [place] [address]  
  
  
[phone] (Work)  
  
  
[phone] (Fax) Home Care Provider Internal Medicine 22           
   
                                                      
  
  
Brenda Hernandez,   
PT  
  
  
[address] [place] [address]  
  
  
[phone] (Work) Home Care  Acute Care 22           
   
                                                      
  
  
Maurizio Cochran PA-C  
  
  
NPI: [id number]  
  
  
[address]  
  
  
[place] [address]  
  
  
[phone] (Work)  
  
  
[phone] (Fax)                 Gastroenterology 23           
   
                                                      
  
  
Nathalia Bernal PA-C  
  
  
NPI: [id number]  
  
  
[address] [place] [address]  
  
  
[phone] (Work)  
  
  
[phone] (Fax)                 General Surgery 23           
  
  
  
                      Team Member Relationship Specialty  Start Date End Date  
   
                                                      
  
  
Júnior Ahuja MD  
  
  
NPI: [id number]  
  
  
[address] [place] [address]  
  
  
[phone] (Work)  
  
  
[phone] (Fax) PCP - General                   10/22/02          
   
                                                      
  
  
Júnior Ahuja MD  
  
  
NPI: [id number]  
  
  
[address] [place] [address]  
  
  
[phone] (Work)  
  
  
[phone] (Fax) Referring       Internal Medicine 22           
   
                                                      
  
  
Júnior Ahuja MD  
  
  
NPI: [id number]  
  
  
[address] [place] [address]  
  
  
[phone] (Work)  
  
  
[phone] (Fax) Home Care Provider Internal Medicine 22           
   
                                                      
  
  
Brenda Hernandez,   
PT  
  
  
[address] [place] [address]  
  
  
[phone] (Work) Home Care  Acute Care 22           
   
                                                      
  
  
Maurizio Cochran PA-C  
  
  
NPI: [id number]  
  
  
[address] [place] [address]  
  
  
[phone] (Work)  
  
  
[phone] (Fax)                 Gastroenterology 23           
   
                                                      
  
  
Nathalia Bernal PA-C  
  
  
NPI: [id number]  
  
  
[address] [place] [address]  
  
  
[phone] (Work)  
  
  
[phone] (Fax)                 General Surgery 23           
  
  
  
                      Team Member Relationship Specialty  Start Date End Date  
   
                                                      
  
  
Júnior Ahuja MD  
  
  
NPI: [id number]  
  
  
[address] [place] [address]  
  
  
[phone] (Work)  
  
  
[phone] (Fax) PCP - General                   10/22/02          
   
                                                      
  
  
Júnior Ahuja MD  
  
  
NPI: [id number]  
  
  
[address] [place] [address]  
  
  
[phone] (Work)  
  
  
[phone] (Fax) Referring       Internal Medicine 22           
   
                                                      
  
  
Júnior Ahuja MD  
  
  
NPI: [id number]  
  
  
[address] [place] [address]  
  
  
[phone] (Work)  
  
  
[phone] (Fax) Home Care Provider Internal Medicine 22           
   
                                                      
  
  
Brenda Hernandez,   
PT  
  
  
[address] [place] [address]  
  
  
[phone] (Work) Home Care  Acute Care 22           
   
                                                      
  
  
Maurizio Cochran PA-C  
  
  
NPI: [id number]  
  
  
[address]  
  
  
[place] [address]  
  
  
[phone] (Work)  
  
  
[phone] (Fax)                 Gastroenterology 23           
   
                                                      
  
  
Nathalia Bernal PA-C  
  
  
NPI: [id number]  
  
  
[address] [place] [address]  
  
  
[phone] (Work)  
  
  
[phone] (Fax)                 General Surgery 23           
  
  
  
                      Team Member Relationship Specialty  Start Date End Date  
   
                                                      
  
  
Júnior Ahuja MD  
  
  
NPI: [id number]  
  
  
[address] [place] [address]  
  
  
[phone] (Work)  
  
  
[phone] (Fax) PCP - General                   10/22/02          
   
                                                      
  
  
Júnior Ahuja MD  
  
  
NPI: [id number]  
  
  
[address] [place] [address]  
  
  
[phone] (Work)  
  
  
[phone] (Fax) Referring       Internal Medicine 22           
   
                                                      
  
  
Júnior Ahuja MD  
  
  
NPI: [id number]  
  
  
[address] [place] [address]  
  
  
[phone] (Work)  
  
  
[phone] (Fax) Home Care Provider Internal Medicine 22           
   
                                                      
  
  
Maurizio Cochran PA-C  
  
  
NPI: [id number]  
  
  
[address]  
  
  
[place] [address]  
  
  
[phone] (Work)  
  
  
[phone] (Fax)                 Gastroenterology 23           
   
                                                      
  
  
Nathalia Bernal PA-C  
  
  
NPI: [id number]  
  
  
[address]  
  
  
[place] [address]  
  
  
[phone] (Work)  
  
  
[phone] (Fax)                 General Surgery 23           
  
  
  
                      Team Member Relationship Specialty  Start Date End Date  
   
                                                      
  
  
Ahuja, Jackie, MD  
  
  
NPI: [id number]  
  
  
[address] [facility]  
  
  
[place] [address]  
  
  
[phone] (Work)  
  
  
[phone] (Fax) PCP - General                   10/22/02          
   
                                                      
  
  
Júnior Ahuja MD  
  
  
NPI: [id number]  
  
  
[address] [place] [address]  
  
  
[phone] (Work)  
  
  
[phone] (Fax) Referring       Internal Medicine 22           
   
                                                      
  
  
Júnior Ahuja MD  
  
  
NPI: [id number]  
  
  
[address] [place] [address]  
  
  
[phone] (Work)  
  
  
[phone] (Fax) Home Care Provider Internal Medicine 22           
   
                                                      
  
  
Maurizio Cochran PA-C  
  
  
NPI: [id number]  
  
  
[address] [place], [place] [address] [phone] (Work)  
  
  
[phone] (Fax)                 Gastroenterology 23           
   
                                                      
  
  
Nathalia Bernal PA-C  
  
  
NPI: [id number]  
  
  
[address] [facility] [place] [place]  
  
  
[place] [address]  
  
  
[phone] (Work)  
  
  
[phone] (Fax)                 General Surgery 23           
  
  
  
                      Team Member Relationship Specialty  Start Date End Date  
   
                                                      
  
  
Júnior Ahuja MD  
  
  
NPI: [id number]  
  
  
[address] [place] [address]  
  
  
[phone] (Work)  
  
  
[phone] (Fax) PCP - General                   10/22/02          
   
                                                      
  
  
Júnior Ahuja MD  
  
  
NPI: [id number]  
  
  
[address] [facility]  
  
  
[place] [address]  
  
  
[phone] (Work)  
  
  
[phone] (Fax) Referring       Internal Medicine 22           
   
                                                      
  
  
Júnior Ahuja MD  
  
  
NPI: [id number]  
  
  
[address] [place] [address]  
  
  
[phone] (Work)  
  
  
[phone] (Fax) Home Care Provider Internal Medicine 22           
   
                                                      
  
  
Maurizio Cochran PA-C  
  
  
NPI: [id number]  
  
  
[address] [place] [address]  
  
  
[phone] (Work)  
  
  
[phone] (Fax)                 Gastroenterology 23           
   
                                                      
  
  
Nathalia Bernal PA-C  
  
  
NPI: [id number]  
  
  
[address]  
  
  
[place], [place] [address] [phone] (Work)  
  
  
[phone] (Fax)                 General Surgery 23           
  
  
  
                      Team Member Relationship Specialty  Start Date End Date  
   
                                                      
  
  
Júnior Ahuja MD  
  
  
NPI: [id number]  
  
  
[address] [place], [place] [address] [phone] (Work)  
  
  
[phone] (Fax) PCP - General                   10/22/02          
   
                                                      
  
  
Júnior Ahuja MD  
  
  
NPI: [id number]  
  
  
[address] [facility] [place] [address] [phone] (Work)  
  
  
[phone] (Fax) Referring       Internal Medicine 22           
   
                                                      
  
  
Júnior Ahuja MD  
  
  
NPI: [id number]  
  
  
[address] [place] [address]  
  
  
[phone] (Work)  
  
  
[phone] (Fax) Home Care Provider Internal Medicine 22           
   
                                                      
  
  
Maurizio Cochran PA-C  
  
  
NPI: [id number]  
  
  
[address]  
  
  
[place] [address]  
  
  
[phone] (Work)  
  
  
[phone] (Fax)                 Gastroenterology 23           
   
                                                      
  
  
Nathalia Bernal PA-C  
  
  
NPI: [id number]  
  
  
[address]  
  
  
[place] [address]  
  
  
[phone] (Work)  
  
  
[phone] (Fax)                 General Surgery 23           
  
  
  
                      Team Member Relationship Specialty  Start Date End Date  
   
                                                      
  
  
Júnior Ahuja MD  
  
  
NPI: [id number]  
  
  
[address] [place] [address]  
  
  
[phone] (Work)  
  
  
[phone] (Fax) PCP - General                   10/22/02          
   
                                                      
  
  
Júnior Ahuja MD  
  
  
NPI: [id number]  
  
  
[address] [place] [address]  
  
  
[phone] (Work)  
  
  
[phone] (Fax) Referring       Internal Medicine 22           
   
                                                      
  
  
Júnior Ahuja MD  
  
  
NPI: [id number]  
  
  
[address] [facility]  
  
  
[place] [address]  
  
  
[phone] (Work)  
  
  
[phone] (Fax) Home Care Provider Internal Medicine 22           
   
                                                      
  
  
Maurizio Cochran PA-C  
  
  
NPI: [id number]  
  
  
[address]  
  
  
[place] [address]  
  
  
[phone] (Work)  
  
  
[phone] (Fax)                 Gastroenterology 23           
   
                                                      
  
  
Nathalia Bernal PA-C  
  
  
NPI: [id number]  
  
  
[address]  
  
  
[place] [address]  
  
  
[phone] (Work)  
  
  
[phone] (Fax)                 General Surgery 23           
  
  
  
                      Team Member Relationship Specialty  Start Date End Date  
   
                                                      
  
  
Júnior Ahuja MD  
  
  
NPI: [id number]  
  
  
[address] [place] [address]  
  
  
[phone] (Work)  
  
  
[phone] (Fax) PCP - General                   10/22/02          
   
                                                      
  
  
Júnior Ahuja MD  
  
  
NPI: [id number]  
  
  
[address] [place] [address]  
  
  
[phone] (Work)  
  
  
[phone] (Fax) Referring       Internal Medicine 22           
   
                                                      
  
  
Júnior Ahuja MD  
  
  
NPI: [id number]  
  
  
[address] [place] [address]  
  
  
[phone] (Work)  
  
  
[phone] (Fax) Home Care Provider Internal Medicine 22           
   
                                                      
  
  
Maurizio Cochran PA-C  
  
  
NPI: [id number]  
  
  
[address] [facility]  
  
  
[place] [address]  
  
  
[phone] (Work)  
  
  
[phone] (Fax)                 Gastroenterology 23           
   
                                                      
  
  
Nathalia Bernal PA-C  
  
  
NPI: [id number]  
  
  
[address]  
  
  
[place] [address]  
  
  
[phone] (Work)  
  
  
[phone] (Fax)                 General Surgery 23           
  
  
  
                      Team Member Relationship Specialty  Start Date End Date  
   
                                                      
  
  
Júnior Ahuja MD  
  
  
NPI: [id number]  
  
  
[address] [place] [address]  
  
  
[phone] (Work)  
  
  
[phone] (Fax) PCP - General                   10/22/02          
   
                                                      
  
  
Júnior Ahuja MD  
  
  
NPI: [id number]  
  
  
[address] [place] [address]  
  
  
[phone] (Work)  
  
  
[phone] (Fax) Referring       Internal Medicine 22           
   
                                                      
  
  
Júnior Ahuja MD  
  
  
NPI: [id number]  
  
  
[address] [facility]  
  
  
[place] [address]  
  
  
[phone] (Work)  
  
  
[phone] (Fax) Home Care Provider Internal Medicine 22           
   
                                                      
  
  
Maurizio Cochran PA-C  
  
  
NPI: [id number]  
  
  
[address] [facility] [place] [address] [phone] (Work)  
  
  
[phone] (Fax)                 Gastroenterology 23           
   
                                                      
  
  
Nathalia Bernal PA-C  
  
  
NPI: [id number]  
  
  
[address] [place] [address]  
  
  
[phone] (Work)  
  
  
[phone] (Fax)                 General Surgery 23           
  
  
  
                      Team Member Relationship Specialty  Start Date End Date  
   
                                                      
  
  
Júnior Ahuja MD  
  
  
NPI: [id number]  
  
  
[address] [place] [address]  
  
  
[phone] (Work)  
  
  
[phone] (Fax) PCP - General                   10/22/02          
   
                                                      
  
  
Júnior Ahuja MD  
  
  
NPI: [id number]  
  
  
[address] [place] [address]  
  
  
[phone] (Work)  
  
  
[phone] (Fax) Referring       Internal Medicine 22           
   
                                                      
  
  
Júnior Ahuja MD  
  
  
NPI: [id number]  
  
  
[address] [place] [address]  
  
  
[phone] (Work)  
  
  
[phone] (Fax) Home Care Provider Internal Medicine 22           
   
                                                      
  
  
Maurizio Cochran PA-C  
  
  
NPI: [id number]  
  
  
[address]  
  
  
[place] [address]  
  
  
[phone] (Work)  
  
  
[phone] (Fax)                 Gastroenterology 23           
   
                                                      
  
  
Nathalia Bernal PA-C  
  
  
NPI: [id number]  
  
  
[address] [place] [address]  
  
  
[phone] (Work)  
  
  
[phone] (Fax)                 General Surgery 23           
  
  
  
                      Team Member Relationship Specialty  Start Date End Date  
   
                                                      
  
  
Júnior Ahuja MD  
  
  
NPI: [id number]  
  
  
[address] [place], [place] [address] [phone] (Work)  
  
  
[phone] (Fax) PCP - General                   10/22/02          
   
                                                      
  
  
Júnior Ahuja MD  
  
  
NPI: [id number]  
  
  
[address] [place] [address]  
  
  
[phone] (Work)  
  
  
[phone] (Fax) Referring       Internal Medicine 22           
   
                                                      
  
  
Júnior Ahuja MD  
  
  
NPI: [id number]  
  
  
[address] [place] [address]  
  
  
[phone] (Work)  
  
  
[phone] (Fax) Home Care Provider Internal Medicine 22           
   
                                                      
  
  
Brenda Hernandez,   
PT  
  
  
[address] [place] [address]  
  
  
[phone] (Work) Home Care  Acute Care 22  
  
  
  
                      Team Member Relationship Specialty  Start Date End Date  
   
                                                      
  
  
Júnior Ahuja MD  
  
  
NPI: [id number]  
  
  
[address] [place] [address]  
  
  
[phone] (Work)  
  
  
[phone] (Fax) PCP - General                   10/22/02          
   
                                                      
  
  
Júnior Ahuja MD  
  
  
NPI: [id number]  
  
  
[address] [place], [place] [address] [phone] (Work)  
  
  
[phone] (Fax) Referring       Internal Medicine 22           
   
                                                      
  
  
úJnior Ahuja MD  
  
  
NPI: [id number]  
  
  
[address] [place], [place] [address] [phone] (Work)  
  
  
[phone] (Fax) Home Care Provider Internal Medicine 22           
   
                                                      
  
  
Maurizio Cochran PA-C  
  
  
NPI: [id number]  
  
  
[address] [facility] [place] [address]  
  
  
[phone] (Work)  
  
  
[phone] (Fax)                 Gastroenterology 23           
   
                                                      
  
  
Nathalia Bernal PA-C  
  
  
NPI: [id number]  
  
  
[address]  
  
  
[place], [place] [address] [phone] (Work)  
  
  
[phone] (Fax)                 General Surgery 23           
  
  
  
                      Team Member Relationship Specialty  Start Date End Date  
   
                                                      
  
  
Júnior Ahuja MD  
  
  
NPI: [id number]  
  
  
[address] [place], [place] [address] [phone] (Work)  
  
  
[phone] (Fax) PCP - General                   10/22/02          
   
                                                      
  
  
Júnior Ahuja MD  
  
  
NPI: [id number]  
  
  
[address] [place], [place] [address] [phone] (Work)  
  
  
[phone] (Fax) Referring       Internal Medicine 22           
   
                                                      
  
  
Júnior Ahuja MD  
  
  
NPI: [id number]  
  
  
[address] [place], [place] [address] [phone] (Work)  
  
  
[phone] (Fax) Home Care Provider Internal Medicine 22           
   
                                                      
  
  
Maurizio Cochran PA-C  
  
  
NPI: [id number]  
  
  
[address] [place] [address]  
  
  
[phone] (Work)  
  
  
[phone] (Fax)                 Gastroenterology 23           
   
                                                      
  
  
Nathalia Bernal PA-C  
  
  
NPI: [id number]  
  
  
[address]  
  
  
[place], [place] [address] [phone] (Work)  
  
  
[phone] (Fax)                 General Surgery 23           
  
  
FOR RECORDS PERTAINING TO PATIENTS WHO ARE OR HAVE BEEN ENROLLED IN A CHEMICAL 
DEPENDENCY/SUBSTANCEABUSE PROGRAM, SOME INFORMATION MAY BE OMITTED. This 
clinical summary was aggregated from multiple sources. Caution should be 
exercised in using it in the provision of clinical care. This summary normalizes
 information from multiple sources, and as a consequence, information in this 
document may materially change the coding, format and clinical context of 
patient data. In addition, data may be omitted in some cases. CLINICAL DECISIONS
 SHOULD BE BASED ON THE PRIMARY CLINICAL RECORDS. [facility]. provides
 no warranty or guarantee of the accuracy or completeness of information in this
 document.

## 2023-12-21 NOTE — EKG12_ITS
Test Reason : SOB 
Blood Pressure : ***/*** mmHG 
Vent. Rate : 128 BPM     Atrial Rate : 063 BPM 
   P-R Int : 202 ms          QRS Dur : 136 ms 
    QT Int : 352 ms       P-R-T Axes : 111 010 021 degrees 
   QTc Int : 513 ms 
  
*** Critical Test Result: Arrhythmia 
Sinus rhythm with frequent Premature ventricular complexes 
Right bundle branch block 
Abnormal ECG 
Confirmed by SUMIT UNDERWOOD, EMEKA ([id number]),  NOE ESCOBAR ([id number]) on 12/22/2023 2:04:10 PM 
  
Referred By:  JUDI           Confirmed By:EMEKA ARANA MD

## 2023-12-21 NOTE — HP.PCM.HOS_ITS
hospitals - General    
General    
Date of Admission: 12/22/23    
Date of Service: 12/21/23    
Chief Complaint: Shortness of breath    
HPI Narrative    
NENA MADRIGAL, is a 83 M with a past medical history of essential hypertension,  
hyperlipidemia; with intolerance to statins, obesity; with BMI of 38.4 this   
admission, history of nonalcoholic fatty liver disease; with splenomegaly and   
cirrhosis, obstructive sleep apnea; treated with BiPAP, history of asthma, hist  
ory of paroxysmal atrial fibrillation; apparently not currently on   
anticoagulation, history of aortic stenosis; status post bioprosthetic aortic   
valve replacement (2011), history of CVA, history of liver cancer, history of   
retinal artery occlusion (2020), history of chronic thrombocytopenia, anemia of   
chronic disease, chronic kidney disease; stage III, history of polycystic kidney  
disease, chronic lower extremity lymphedema; previously followed by [facility]   
that he has not seen for months, depression, gout, GERD followed by the [facility] who presents to [facility] ER complaining of   
shortness of breath.  Mr. Madrigal reports his symptoms began approximately 2   
weeks prior to admission with dyspnea on exertion that progressed to shortness   
of breath at rest.  He also admits to a nonproductive cough.  He states that he   
was initially supposed to go to the [facility] position approximately 2-  
1/2 weeks ago but could not get in the van.  He stated that he did not have a   
ride-but he does have a daughter who is actively involved in his care.  His   
daughter states that he is very stubborn and does not want help.  In addition to  
this she also states that he is generally weak and when she went to pick him up   
he could not even walk.  He denies associated fever, chills, nausea, vomiting   
and he states that he is able to sleep flat at night without shortness of breath  
.  In the ER he was noted to have bilateral lower lobe infiltrates consistent   
with suspected community-acquired pneumonia complicated by clinical evidence of   
acute hypoxic respiratory insufficiency and right lower extremity cellulitis he   
was then admitted to the PCU for ongoing care for stay that is expected to be   
greater than 48 hours.    
    
    
[facility]    
Medical History (Reviewed 12/22/23 @ 00:20 by Dr. Joseph Li DO)    
    
Anemia    
Anemia in chronic kidney disease    
Anemia of chronic disease    
Aortic valve disorder    
Asthma    
Body mass index (BMI) 40.0-44.9, adult    
Chronic acquired lymphedema    
CKD (chronic kidney disease), stage III    
Depression    
Essential (primary) hypertension    
GERD (gastroesophageal reflux disease)    
Gout    
History of cerebrovascular accident    
History of splenomegaly    
Hyperlipemia    
Liver cancer    
Morbid obesity    
Multiple lung nodules    
New onset atrial flutter    
Non-alcoholic cirrhosis    
Nonobstructive atherosclerosis of coronary artery    
Nonrheumatic aortic (valve) stenosis    
OAB (overactive bladder)    
Obesity    
Obesity (BMI 30-39.9)    
CAREN treated with BiPAP    
Osteoarthritis    
PCK (polycystic kidney disease)    
Peripheral neuropathy    
Polycystic kidney disease    
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    
Right bundle branch block (RBBB)    
Splenomegaly    
Thrombocytopenia    
Thrombocytopenia    
Urge incontinence    
    
    
                                Home Medications    
    
allopurinol 300 mg tablet 300 mg PO DAILY gout 12/27/16 [History Last Taken   
12/22/19]    
fenofibrate nanocrystallized 48 mg tablet 48 mg PO DAILY cholesterol 12/27/16   
[History Last Taken 12/22/19]    
magnesium oxide 400 mg (241.3 mg magnesium) tablet 400 mg PO DAILY supplement   
12/27/16 [History Last Taken 12/22/19]    
multivit-min-folic acid 0.4 mg-lycopene 300 mcg-lutein 250 mcg tablet 1 ea PO   
DAILY vitamin 12/27/16 [History Last Taken 12/22/19]    
sertraline 100 mg tablet 50 mg PO QHS depression 05/21/20 [History Last Taken   
Unknown]    
aspirin 81 mg tablet,delayed release (Adult Aspirin Regimen) 81 mg PO DAILY   
07/27/21 [History Last Taken Unknown]    
ferrous sulfate 325 mg (65 mg iron) tablet 325 mg PO BID 07/27/21 [History Last   
Taken Unknown]    
albuterol sulfate 90 mcg/actuation aerosol inhaler 2 puff inhalation Q6H PRN   
ASTHMA 02/08/22 [History Last Taken Unknown]    
fluticasone propionate 50 mcg/actuation nasal spray,suspension 1 spray   
intranasal DAILY 02/08/22 [History Last Taken Unknown]    
omeprazole 40 mg capsule,delayed release 40 mg PO DAILY 02/08/22 [History Last   
Taken Unknown]    
oxybutynin chloride 15 mg tablet,extended release 24 hr 30 mg PO DAILY 02/08/22   
[History Last Taken Unknown]    
ascorbic acid (vitamin C) 500 mg capsule 500 mg PO DAILY 01/31/23 [History Last   
Taken Unknown]    
furosemide 20 mg tablet 20 mg PO Q OTHER DAY 01/31/23 [History Last Taken   
Unknown]    
nitroglycerin 0.4 mg sublingual tablet 0.4 mg sublingual Q5M PRN chest pain   
01/31/23 [History Last Taken Unknown]    
lidocaine 5 % topical patch (Lidoderm) 1 patch topical DAILY #15 ea 05/28/23 [Rx  
 Last Taken Unknown]    
cyanocobalamin (vitamin B-12) 5,000 mcg capsule 2,500 mcg PO DAILY 07/15/23   
[History Last Taken Unknown]    
rifaximin 550 mg tablet (Xifaxan) 550 mg PO BID 07/15/23 [History Last Taken   
Unknown]    
diltiazem HCl 120 mg capsule,extended release 24 hr 120 mg PO DAILY #30 caps   
08/23/23 [Rx Last Taken Unknown]    
metoprolol tartrate 25 mg tablet 25 mg PO BID #60 tabs 08/23/23 [Rx Last Taken   
Unknown]    
fluticasone furoate 200 mcg-vilanterol 25 mcg/dose inhalation powder (Breo   
Ellipta) 1 inh inhalation DAILY 12/21/23 [History Last Taken Unknown]    
    
    
                                            
    
    
    
Allergy/AdvReac Type Severity Reaction Status Date / Time    
     
hydrocodone [From Vicodin] Allergy  Other Verified 12/21/23 21:38    
     
strawberry Allergy  Hives Verified 12/21/23 21:38    
     
venom-honey bee Allergy  Anaphylaxis Verified 12/21/23 21:38    
    
[bee venom (honey bee)]         
     
adhesive tape AdvReac  Rash Verified 07/15/23 19:14    
     
atorvastatin AdvReac  PT UNSURE Verified 12/21/23 21:38    
    
   OF REACTION      
     
montelukast AdvReac  PT UNSURE Verified 12/21/23 21:38    
    
   OF REACTION      
    
    
    
Family History (Reviewed 12/22/23 @ 00:20 by Dr. Joseph Li DO)    
Father   CAD (coronary artery disease)    
Brother   Diabetes    
Mother   Heart disease    
    
    
Surgical History (Reviewed 12/22/23 @ 00:20 by Dr. Joseph Li DO)    
    
Aortic valve replaced    
History of aortic valve replacement with bioprosthetic valve (10/04/11)    
History of hip surgery (05/2020)    
History of knee replacement    
History of left heart catheterization (09/21/11)    
History of tonsillectomy    
History of total left hip arthroplasty    
History of total left hip replacement    
History of umbilical hernia repair    
    
    
Social History (Reviewed 12/22/23 @ 00:20 by Dr. Joseph Li DO)    
household members:  none     
Smoking Status:  Never smoker     
alcohol intake:  never     
substance use type:  does not use     
    
    
    
ROS    
ROS Narrative    
Review of systems:    
    
Constitutional: Patient denies fevers, chills or night sweats.    
Eyes: Patient denies changes in vision or discharge from eyes.    
ENT: Patient denies ear pain, runny nose or sore throat.    
Cardiovascular: Patient denies chest pain, chest pressure or palpitations.    
Respiratory: Patient admits to dyspnea on exertion that progressed to shortness   
of breath at rest with a nonproductive cough.    
Gastrointestinal: Patient denies abdominal pain, constipation diarrhea, nausea   
or vomiting.    
Genitourinary: Patient denies dysuria, hematuria or urinary frequency.    
Musculoskeletal: Patient admits to generalized weakness with chronic ambulatory   
dysfunction as outlined above.  Patient denies arthralgias or myalgias.    
Integumentary: Patient admits to chronic lower extremity lymphedema with acute   
redness and swelling of his right anterior shin area with obvious cellulitis.    
Neurologic: Patient denies headache, paresthesias, weakness or focal neurologic   
deficits.    
Psychiatric: Patient denies significant anxiety or depression.    
Endocrine: Patient denies polyuria, polydipsia or polyphagia.    
Allergic: Patient denies lip swelling, tongue swelling or urticaria.    
Hematologic: Patient denies easy bleeding or easy bruisability.    
    
    
14 point review systems otherwise negative save for positives noted above in   
HPI.    
    
Vital Signs    
Vital Signs    
Vital Signs:     
                                            
    
    
    
 12/21/23    
21:39 12/21/23    
21:44 12/21/23    
22:06    
     
Temperature 98.2 F 98.2 F     
     
Temperature Source Oral Oral     
     
Pulse Rate 130 H 130 H     
     
Respiratory Rate 18 18     
     
Blood Pressure 127/92 H 127/92 H     
     
Blood Pressure Mean 103 103     
     
Pulse Ox 91 93 91    
     
Oxygen Delivery Method Room Air Room Air Room Air    
     
Oxygen Flow Rate (L/min)       
    
    
    
    
 12/21/23    
22:55    
     
Temperature     
     
Temperature Source     
     
Pulse Rate 124 H    
     
Respiratory Rate 27 H    
     
Blood Pressure 96/56 L    
     
Blood Pressure Mean 69    
     
Pulse Ox 97    
     
Oxygen Delivery Method Nasal Cannula    
     
Oxygen Flow Rate (L/min) 2    
    
    
                                     Weight    
    
    
    
Weight:                        267 lb 6.731 oz                                    
    
     
Body Mass Index (BMI)          38.3                                               
    
    
    
    
    
    
Physical Exam    
Const    
alert, oriented x3 and no apparent distress    
Constitutional Narrative:     
Patient is very obese and appears chronically ill.    
General Appearance: cooperative    
HEENT    
normocephalic, head/scalp atraumatic and hearing grossly normal bilaterally    
HEENT Narrative:     
Oropharynx dry.    
Eyes    
PERRL and EOMs intact bilaterally    
Neck    
no lymphadenopathy and supple    
Resp    
Resp Narrative:     
Diminished breath sounds throughout.    
Cardio    
Cardio Narrative:     
Irregularly irregular at approximately 120 bpm.    
GI    
normal to inspection, nondistended, normoactive bowel sounds, soft to palpation,  
non-tender and non-distended    
GI Narrative:     
Obese.    
Extremity    
Extremity Narrative:     
Patient has chronic lower extremity swelling with erythema and increased warmth   
in the right tibial region.    
Skin    
Skin Narrative:     
Patient has chronic lower extremity swelling with erythema and increased warmth   
in the right anterior tibial region.    
Neuro    
oriented x3, CN's II-XII intact bilaterally, moves all extremities and no focal   
motor deficits    
Sensorium / Orientation: awake, alert, oriented to person, oriented to place and  
oriented to time    
Speech: speech normal    
Psych    
affect normal    
    
Results    
Medical Records Data    
Attestation: I reviewed the patient's medical records    
Lab / Micro Data    
Attestation: I reviewed the patient's lab results.    
    
                                                        12/22/23 03:30              
    
                                                        12/22/23 03:30              
    
Labs:     
                         Laboratory Results - last 24 hr    
    
12/21/23 21:56: WBC 11.4 H, RBC 4.26 L, Hgb 14.3, Hct 43.3, .6 H, MCH   
33.6 H, MCHC 33.0, RDW Std Deviation 56.5 H, RDW Coeff of Ilia 15.1 H, Plt Count   
131 L, MPV 10.7, Immature Gran % (Auto) 0.300, Neut % (Auto) 93.2 H, Lymph %   
(Auto) 1.8 L, Mono % (Auto) 4.1, Eos % (Auto) 0.2, Baso % (Auto) 0.4, Absolute   
Neuts (auto) 10.6 H, Absolute Lymphs (auto) 0.21 L, Nucleated RBC % 0,   
Differential Comment , Sodium 142, Potassium 4.4, Chloride 110 H, Carbon Dioxide  
27.0, Anion Gap 5, BUN 22 H, Creatinine 0.88, Estim Creat Clear Calc 65.67, Est   
GFR (MDRD) Af Amer 106, Est GFR (MDRD) Non-Af 87, BUN/Creatinine Ratio 24.9 H,   
Glucose 96, Calcium 11.2 H, Troponin I High Sens 24, B-Natriuretic Peptide 270.4  
H    
    
    
Imagaing    
                              Radiology Impression    
    
Chest X-Ray  12/21/23 22:07    
IMPRESSION:    
     
Bilateral lower lobe airspace disease which may represent pneumonia.    
     
Electronically Signed:    
Amari King MD    
2023/12/21 at 22:36 EST    
Reading Location ID and State: [id number] / [place]    
Tel [phone], Service support  [phone], Fax [phone]    
     
    
    
    
    
Assessment & Plan    
Assessment/Plan    
(1) Paroxysmal A-fib:     
PLAN:     
Plan    
1.  Bibasilar pneumonia evident on chest x-ray with leukocytosis of 11.4 present  
on admission - Admit to PCU.  Continue broad-spectrum antibiotics and await   
culture and sensitivity data.  Give Tylenol as needed for mild to moderate level  
1-5 out of 10 pain or fever.  Give Mucinex twice daily to thin and mobilize   
secretions.  Give nebulizers as needed for wheezing.  The patient's BNP is only   
mildly elevated to 270.4 pg/mL present on admission making AE CHF less likely   
primary diagnosis in this case.  Finally, we will check V/Q scan to evaluate for  
possible PE with highly elevated D-dimer of 3.03 present on admission and   
chronic lower extremity edema.    
    
2.  Paroxysmal atrial fibrillation; with rapid ventricular response of approxima  
tely 120 to 130 bpm present on admission complicating #1 - Start full dose   
Lovenox for CVA prophylaxis.  Also start IV Cardizem drip to be titrated to keep  
heart rate less than or equal to 100 bpm.  Check echocardiogram to evaluate left  
ventricular ejection fraction.  Finally, we will consult the cardiologist on-  
call to see this patient on rounds in the a.m. for further recommendations with   
help appreciated in advance.    
    
3.  Right leg cellulitis in the setting of chronic lower extremity lymphedema   
and very poor mobility compounding #1 and #2 - Continue antibiotics initiated   
for #1.  Keep leg elevated.  Check lower extremity Doppler to evaluate for DVT   
with elevated D-dimer of 3.03 present on admission.  Finally, he will need to be  
reset up with wound care follow-up at the time of discharge.    
    
4.  Hypercalcemia of 11.2 mg/dL present on admission adding to the pathology of   
#1 - #3 - Give normal saline IV fluid plus Lasix 40 mg IV x 1.  Check intact PTH  
and reassess calcium level in the a.m. to monitor response to treatment.    
    
5.  Acute hypoxic respiratory insufficiency likely arising from #1 - #4 - Wean   
supplemental oxygen as tolerated.    
    
6.  Obesity; with a BMI of 38.4 present on admission with chronic generalized   
weakness and ambulatory dysfunction exacerbating #1 - #5 - Weight loss will be   
recommended.  Check TSH this admission.  Finally, we will consult PT OT and case  
management to see this patient on rounds in the a.m. for further recommendations  
as he may require ECF for subacute rehabilitation at the time his treatment for   
this admission is concluded.    
    
7.  History of nonalcoholic fatty liver disease; with splenomegaly and cirrhosis  
in the setting of previous liver cancer - Stable ascites present on CT this   
admission.    
    
8.  Essential hypertension - Continue home medications as previous plus give as   
needed IV hydralazine for systolic blood pressure greater than 160 mmHg.     
    
9.  Hyperlipidemia; with intolerance to statins - Check lipid profile this   
admission.    
    
10.  Obstructive sleep apnea; treated with BiPAP - Resume CPAP.    
    
11.  History of asthma - Stable with no evidence of flare at this time.    
Continue as needed nebulizers.     
    
12.  History of aortic stenosis; status post bioprosthetic aortic valve   
replacement (2011) - Noted.    
    
13.  History of CVA - Noted.    
    
14.  History of retinal artery occlusion (2020) - Noted.    
    
15.  History of chronic thrombocytopenia - Noted with level of 131 present on   
admission.     
    
16.  Anemia of chronic disease in the setting of chronic kidney disease; stage   
III - Stable.      
    
17.  History of polycystic kidney disease - Noted.    
    
18.  Depression - Continue medications as previous plus give as needed Xanax for  
breakthrough symptoms.      
    
19.  Chronic Gout - Stable with no evidence of acute flare.    
    
20.  GERD - Continue PPI.    
    
21.  DVT prophylaxis - Patient on full-dose Lovenox for #2.      
    
    
Total time:  Approximately 55 minutes.

## 2023-12-21 NOTE — EDS_ITS
HPI    
History of Present Illness    
Chief Complaint: Shortness of Breath    
Narrative    
Narrative:     
83-year-old male with history of A-fib, CAD, CAREN, obesity presenting with 2   
weeks of worsening dyspnea.  This is worse with exertion.  Patient states that   
he is okay when he sitting.  He is able to sleep at night flat on his side   
without orthopnea.  No fevers or chills.  He states [facility] but could not get in the van.  He states   
that he was unable to get a ride and does not have any family however at this   
point in the interview his daughter walked in and states that she could have   
taken him.  States that he is stubborn and does not want help.  He is weak and   
when she went to pick him up he could not even walk.    
    
[facility]    
Medical History (Reviewed 12/22/23 @ 00:02 by Dr. Ritchie Fernando, DO)    
    
Anemia    
Anemia in chronic kidney disease    
Anemia of chronic disease    
Aortic valve disorder    
Asthma    
Body mass index (BMI) 40.0-44.9, adult    
Chronic acquired lymphedema    
CKD (chronic kidney disease), stage III    
Depression    
Essential (primary) hypertension    
GERD (gastroesophageal reflux disease)    
Gout    
History of cerebrovascular accident    
History of splenomegaly    
Hyperlipemia    
Liver cancer    
Morbid obesity    
Multiple lung nodules    
New onset atrial flutter    
Non-alcoholic cirrhosis    
Nonobstructive atherosclerosis of coronary artery    
Nonrheumatic aortic (valve) stenosis    
OAB (overactive bladder)    
Obesity    
Obesity (BMI 30-39.9)    
CAREN treated with BiPAP    
Osteoarthritis    
PCK (polycystic kidney disease)    
Peripheral neuropathy    
Polycystic kidney disease    
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    
Right bundle branch block (RBBB)    
Splenomegaly    
Thrombocytopenia    
Thrombocytopenia    
Urge incontinence    
    
    
                                Home Medications    
    
allopurinol 300 mg tablet 300 mg PO DAILY gout 12/27/16 [History Last Taken   
12/22/19]    
fenofibrate nanocrystallized 48 mg tablet 48 mg PO DAILY cholesterol 12/27/16   
[History Last Taken 12/22/19]    
magnesium oxide 400 mg (241.3 mg magnesium) tablet 400 mg PO DAILY supplement   
12/27/16 [History Last Taken 12/22/19]    
multivit-min-folic acid 0.4 mg-lycopene 300 mcg-lutein 250 mcg tablet 1 ea PO   
DAILY vitamin 12/27/16 [History Last Taken 12/22/19]    
sertraline 100 mg tablet 50 mg PO QHS depression 05/21/20 [History Last Taken   
Unknown]    
aspirin 81 mg tablet,delayed release (Adult Aspirin Regimen) 81 mg PO DAILY   
07/27/21 [History Last Taken Unknown]    
ferrous sulfate 325 mg (65 mg iron) tablet 325 mg PO BID 07/27/21 [History Last   
Taken Unknown]    
albuterol sulfate 90 mcg/actuation aerosol inhaler 2 puff inhalation Q6H PRN   
ASTHMA 02/08/22 [History Last Taken Unknown]    
fluticasone propionate 50 mcg/actuation nasal spray,suspension 1 spray   
intranasal DAILY 02/08/22 [History Last Taken Unknown]    
omeprazole 40 mg capsule,delayed release 40 mg PO DAILY 02/08/22 [History Last   
Taken Unknown]    
oxybutynin chloride 15 mg tablet,extended release 24 hr 30 mg PO DAILY 02/08/22   
[History Last Taken Unknown]    
ascorbic acid (vitamin C) 500 mg capsule 500 mg PO DAILY 01/31/23 [History Last   
Taken Unknown]    
furosemide 20 mg tablet 20 mg PO Q OTHER DAY 01/31/23 [History Last Taken   
Unknown]    
nitroglycerin 0.4 mg sublingual tablet 0.4 mg sublingual Q5M PRN chest pain   
01/31/23 [History Last Taken Unknown]    
lidocaine 5 % topical patch (Lidoderm) 1 patch topical DAILY #15 ea 05/28/23 [Rx  
 Last Taken Unknown]    
cyanocobalamin (vitamin B-12) 5,000 mcg capsule 2,500 mcg PO DAILY 07/15/23   
[History Last Taken Unknown]    
rifaximin 550 mg tablet (Xifaxan) 550 mg PO BID 07/15/23 [History Last Taken   
Unknown]    
diltiazem HCl 120 mg capsule,extended release 24 hr 120 mg PO DAILY #30 caps   
08/23/23 [Rx Last Taken Unknown]    
metoprolol tartrate 25 mg tablet 25 mg PO BID #60 tabs 08/23/23 [Rx Last Taken   
Unknown]    
fluticasone furoate 200 mcg-vilanterol 25 mcg/dose inhalation powder (Breo   
Ellipta) 1 inh inhalation DAILY 12/21/23 [History Last Taken Unknown]    
    
    
                                            
    
    
    
Allergy/AdvReac Type Severity Reaction Status Date / Time    
     
hydrocodone [From Vicodin] Allergy  Other Verified 12/21/23 21:38    
     
strawberry Allergy  Hives Verified 12/21/23 21:38    
     
venom-honey bee Allergy  Anaphylaxis Verified 12/21/23 21:38    
    
[bee venom (honey bee)]         
     
adhesive tape AdvReac  Rash Verified 07/15/23 19:14    
     
atorvastatin AdvReac  PT UNSURE Verified 12/21/23 21:38    
    
   OF REACTION      
     
montelukast AdvReac  PT UNSURE Verified 12/21/23 21:38    
    
   OF REACTION      
    
    
    
Family History (Reviewed 12/22/23 @ 00:02 by Dr. Ritchie Fernando, DO)    
Father   CAD (coronary artery disease)    
Brother   Diabetes    
Mother   Heart disease    
    
    
Surgical History (Reviewed 12/22/23 @ 00:02 by Dr. Ritchie Fernando, DO)    
    
Aortic valve replaced    
History of aortic valve replacement with bioprosthetic valve (10/04/11)    
History of hip surgery (05/2020)    
History of knee replacement    
History of left heart catheterization (09/21/11)    
History of tonsillectomy    
History of total left hip arthroplasty    
History of total left hip replacement    
History of umbilical hernia repair    
    
    
Social History (Reviewed 12/22/23 @ 00:02 by Dr. Ritchie Fernando, DO)    
household members:  none     
Smoking Status:  Never smoker     
alcohol intake:  never     
substance use type:  does not use     
    
    
    
ROS    
ROS ED    
Constitutional    
Constitutional ED: Denies chills, fever(s) or sweats    
Eyes    
Eyes: Denies blurry vision or change in vision    
ENT    
ENT ED: Denies ear pain or sore throat    
Cardiovascular    
Cardiovascular: Denies chest pain, palpitations or racing heartbeat    
Respiratory/Chest    
Respiratory/Chest: Reports cough and dyspnea; Denies sputum    
Gastrointestinal    
Gastrointestinal: Denies abdominal pain, constipation, diarrhea, nausea or   
vomiting    
Genitourinary    
Genitourinary ED: Denies dysuria, hematuria or urinary frequency    
Musculoskeletal    
Musculoskeletal: Denies arthralgias, myalgias or neck pain    
Integumentary    
Denies abscess, Abrasions or rash    
Neurologic    
Neurologic: Denies headache(s), paresthesias or weakness    
Psychiatric    
Psychiatric: Denies anxiety, depression, suicidal ideation or suicidal thoughts    
Endocrine    
Endocrinology: Denies polydipsia or polyuria    
    
EXAM    
Physical Exam    
Const    
Vital Signs:     
    
                                            
    
    
    
 12/21/23    
21:39 12/21/23    
21:44 12/21/23    
22:06    
     
Temperature 98.2 F 98.2 F     
     
Temperature Source Oral Oral     
     
Pulse Rate 130 H 130 H     
     
Respiratory Rate 18 18     
     
Blood Pressure 127/92 H 127/92 H     
     
Blood Pressure Mean 103 103     
     
Pulse Ox 91 93 91    
     
Oxygen Delivery Method Room Air Room Air Room Air    
     
Oxygen Flow Rate (L/min)       
    
    
    
    
 12/21/23    
22:55 12/21/23    
23:37    
     
Temperature  98.2 F    
     
Temperature Source  Oral    
     
Pulse Rate 124 H 118 H    
     
Respiratory Rate 27 H 22 H    
     
Blood Pressure 96/56 L 111/73    
     
Blood Pressure Mean 69 85    
     
Pulse Ox 97 95    
     
Oxygen Delivery Method Nasal Cannula Nasal Cannula    
     
Oxygen Flow Rate (L/min) 2 2    
    
    
    
    
Positive well nourished and well developed    
General Appearance ED: well developed    
HEENT    
Reports dry mucous membranes    
Mouth ED: Yes dry mucous membranes    
Mouth: dry mucous membranes    
Eyes    
PERRL and EOMs intact bilaterally    
General Eye ED: Negative for pale conjunctiva    
Neck    
no lymphadenopathy, supple and no meningeal signs    
Resp    
Auscultation: diminished lung sounds bilateral lower    
Cardio    
Rate: tachycardic    
Rhythm: abnormal rhythm irregularly irregular    
GI    
non-tender    
Extremity    
Extremity Narrative:     
Erythema noted to the right tibial region.  There is increased warmth in this   
area    
General Extremety ED: Yes edema    
General Extremity: edema    
Neuro    
oriented x3 and CN's II-XII intact bilaterally    
Sensorium / Orientation: alert    
Psych    
mental status grossly normal    
Skin    
Skin Narrative:     
As described above    
    
MDM    
MDM    
MDM Narrative    
Medical decision making narrative:     
83-year-old male presenting with generalized weakness, cough, dyspnea.  Hypoxic   
on room air.  He is placed on 2 L of nasal cannula.  Differential includes ACS,   
CHF, pneumonia, COVID, influenza, dehydration, electro abnormalities, A-fib with  
RVR.  CBC was obtained to assess white blood cell count, hemoglobin, platelets.   
BMP to assess renal function, electrolytes.  High-sensitivity troponin and EKG   
were obtained to assess for ischemia/dysrhythmia.  BNP to assess for CHF.  Chest  
x-ray was obtained to assess for CHF or pneumonia.  CBC shows white blood cell   
count 4.  Today blood pressure 1.3.  Platelets 131 for low.  Creatinine normal   
1.88.  Electrolytes unremarkable.  High-sensitivity troponin is 24.  EKG   
initially looked like sinus tachycardia however on the monitor in the room he   
appears to be in A-fib.  Patient given Cardizem 20 mg IV and given a liter   
normal saline.  His initial blood pressure was 127/92 and this did drop into the  
90s systolically after medication was given.  His heart rate came down from 130   
to 115?120.  Patient states he feels well.  BNP mildly elevated to 70.4.  Chest   
x-ray my interpretation shows bilateral lower lobe pneumonia.  Patient given   
Rocephin and azithromycin.  Patient's blood pressure now stable at 111/73.    
Patient's pulse ox 95% on 2 L nasal cannula.  Heart rate still 115-120.  At this  
point I will give the patient has a liter of IV fluids.  Discussed with the   
hospitalist for admission.  I did add a D-dimer to his workup to assess for   
blood clot as the patient is at risk with hypoxia and being generally weak and   
bedridden for the last 2 weeks.  D-dimer came back at 3.03.  Will obtain a CTA   
of the chest.  Patient will be admitted and this was followed.    
    
Impression:    
1.  Bilateral pneumonia    
2.  A-fib with RVR    
3.  Dehydration    
4.  Elevated D-dimer    
Lab Data    
Attestation: I reviewed the patient's lab results.    
Labs:     
    
                         Laboratory Results - last 24 hr    
    
    
    
  12/21/23    
    
  21:56    
     
WBC  11.4 H    
     
RBC  4.26 L    
     
Hgb  14.3    
     
Hct  43.3    
     
MCV  101.6 H    
     
MCH  33.6 H    
     
MCHC  33.0    
     
RDW Std Deviation  56.5 H    
     
RDW Coeff of Ilia  15.1 H    
     
Plt Count  131 L    
     
MPV  10.7    
     
Immature Gran % (Auto)  0.300    
     
Neut % (Auto)  93.2 H    
     
Lymph % (Auto)  1.8 L    
     
Mono % (Auto)  4.1    
     
Eos % (Auto)  0.2    
     
Baso % (Auto)  0.4    
     
Absolute Neuts (auto)  10.6 H    
     
Absolute Lymphs (auto)  0.21 L    
     
Nucleated RBC %  0    
     
Differential Comment      
     
D-Dimer Quant (PE/DVT)  3.03 H*    
     
Sodium  142    
     
Potassium  4.4    
     
Chloride  110 H    
     
Carbon Dioxide  27.0    
     
Anion Gap  5    
     
BUN  22 H    
     
Creatinine  0.88    
     
Estim Creat Clear Calc  65.67    
     
Est GFR (MDRD) Af Amer  106    
     
Est GFR (MDRD) Non-Af  87    
     
BUN/Creatinine Ratio  24.9 H    
     
Glucose  96    
     
Calcium  11.2 H    
     
Troponin I High Sens  24    
     
B-Natriuretic Peptide  270.4 H    
    
    
    
    
Radiography    
Diagnostic Testing:     
    
Clinical Impression(s) from Imaging Studies    
    
Chest X-Ray  12/21/23 22:07    
IMPRESSION:    
     
Bilateral lower lobe airspace disease which may represent pneumonia.    
     
Electronically Signed:    
Amari King MD    
2023/12/21 at 22:36 EST    
Reading Location ID and State: [id number] / [place]    
Tel [phone], Service support  [phone], Fax [phone]    
     
    
    
    
    
    
Discharge Plan    
Triage    
Chief Complaint: Shortness of Breath    
    
ED Provider: Ritchie Fernando    
    
Dx/Rx/DC Orders    
Primary Care Provider: Júnior Ahuja

## 2023-12-22 VITALS
HEART RATE: 82 BPM | SYSTOLIC BLOOD PRESSURE: 110 MMHG | DIASTOLIC BLOOD PRESSURE: 62 MMHG | RESPIRATION RATE: 18 BRPM | OXYGEN SATURATION: 93 % | TEMPERATURE: 98.42 F

## 2023-12-22 VITALS
RESPIRATION RATE: 14 BRPM | OXYGEN SATURATION: 92 % | DIASTOLIC BLOOD PRESSURE: 70 MMHG | SYSTOLIC BLOOD PRESSURE: 86 MMHG | HEART RATE: 139 BPM

## 2023-12-22 VITALS
HEART RATE: 92 BPM | RESPIRATION RATE: 14 BRPM | DIASTOLIC BLOOD PRESSURE: 57 MMHG | OXYGEN SATURATION: 94 % | TEMPERATURE: 98.6 F | SYSTOLIC BLOOD PRESSURE: 103 MMHG

## 2023-12-22 VITALS
DIASTOLIC BLOOD PRESSURE: 76 MMHG | HEART RATE: 76 BPM | OXYGEN SATURATION: 95 % | RESPIRATION RATE: 16 BRPM | SYSTOLIC BLOOD PRESSURE: 112 MMHG | TEMPERATURE: 98.7 F

## 2023-12-22 VITALS
HEART RATE: 138 BPM | SYSTOLIC BLOOD PRESSURE: 87 MMHG | OXYGEN SATURATION: 97 % | DIASTOLIC BLOOD PRESSURE: 62 MMHG | RESPIRATION RATE: 21 BRPM

## 2023-12-22 VITALS
HEART RATE: 138 BPM | DIASTOLIC BLOOD PRESSURE: 69 MMHG | SYSTOLIC BLOOD PRESSURE: 87 MMHG | OXYGEN SATURATION: 95 % | RESPIRATION RATE: 16 BRPM

## 2023-12-22 VITALS
OXYGEN SATURATION: 96 % | TEMPERATURE: 97.88 F | SYSTOLIC BLOOD PRESSURE: 104 MMHG | DIASTOLIC BLOOD PRESSURE: 67 MMHG | HEART RATE: 99 BPM | RESPIRATION RATE: 16 BRPM

## 2023-12-22 VITALS
RESPIRATION RATE: 16 BRPM | SYSTOLIC BLOOD PRESSURE: 89 MMHG | OXYGEN SATURATION: 96 % | HEART RATE: 112 BPM | DIASTOLIC BLOOD PRESSURE: 68 MMHG

## 2023-12-22 VITALS
DIASTOLIC BLOOD PRESSURE: 64 MMHG | SYSTOLIC BLOOD PRESSURE: 95 MMHG | OXYGEN SATURATION: 98 % | HEART RATE: 139 BPM | RESPIRATION RATE: 15 BRPM

## 2023-12-22 VITALS
HEART RATE: 138 BPM | DIASTOLIC BLOOD PRESSURE: 60 MMHG | RESPIRATION RATE: 14 BRPM | OXYGEN SATURATION: 94 % | SYSTOLIC BLOOD PRESSURE: 85 MMHG

## 2023-12-22 VITALS
RESPIRATION RATE: 21 BRPM | OXYGEN SATURATION: 94 % | DIASTOLIC BLOOD PRESSURE: 65 MMHG | HEART RATE: 139 BPM | SYSTOLIC BLOOD PRESSURE: 81 MMHG

## 2023-12-22 VITALS — DIASTOLIC BLOOD PRESSURE: 81 MMHG | SYSTOLIC BLOOD PRESSURE: 111 MMHG | HEART RATE: 76 BPM

## 2023-12-22 VITALS — HEART RATE: 85 BPM | OXYGEN SATURATION: 99 % | RESPIRATION RATE: 20 BRPM

## 2023-12-22 VITALS — OXYGEN SATURATION: 92 %

## 2023-12-22 VITALS
TEMPERATURE: 97.52 F | DIASTOLIC BLOOD PRESSURE: 81 MMHG | RESPIRATION RATE: 14 BRPM | SYSTOLIC BLOOD PRESSURE: 111 MMHG | OXYGEN SATURATION: 99 % | HEART RATE: 76 BPM

## 2023-12-22 VITALS
TEMPERATURE: 97.5 F | RESPIRATION RATE: 24 BRPM | DIASTOLIC BLOOD PRESSURE: 73 MMHG | SYSTOLIC BLOOD PRESSURE: 94 MMHG | HEART RATE: 138 BPM | OXYGEN SATURATION: 94 %

## 2023-12-22 VITALS — OXYGEN SATURATION: 93 % | HEART RATE: 70 BPM | RESPIRATION RATE: 17 BRPM

## 2023-12-22 VITALS — SYSTOLIC BLOOD PRESSURE: 103 MMHG | HEART RATE: 92 BPM | DIASTOLIC BLOOD PRESSURE: 57 MMHG

## 2023-12-22 VITALS
OXYGEN SATURATION: 95 % | SYSTOLIC BLOOD PRESSURE: 90 MMHG | HEART RATE: 140 BPM | DIASTOLIC BLOOD PRESSURE: 64 MMHG | RESPIRATION RATE: 16 BRPM

## 2023-12-22 VITALS
RESPIRATION RATE: 21 BRPM | DIASTOLIC BLOOD PRESSURE: 72 MMHG | SYSTOLIC BLOOD PRESSURE: 90 MMHG | OXYGEN SATURATION: 97 % | HEART RATE: 98 BPM

## 2023-12-22 VITALS
SYSTOLIC BLOOD PRESSURE: 67 MMHG | OXYGEN SATURATION: 89 % | HEART RATE: 138 BPM | RESPIRATION RATE: 15 BRPM | DIASTOLIC BLOOD PRESSURE: 54 MMHG

## 2023-12-22 VITALS — OXYGEN SATURATION: 89 %

## 2023-12-22 VITALS — HEART RATE: 128 BPM

## 2023-12-22 LAB
ALANINE AMINOTRANSFER ALT/SGPT: 28 U/L (ref 16–61)
ALANINE AMINOTRANSFER ALT/SGPT: 31 U/L (ref 16–61)
ALBUMIN SERPL-MCNC: 2 G/DL (ref 3.2–5)
ALBUMIN SERPL-MCNC: 2.2 G/DL (ref 3.2–5)
ALKALINE PHOSPHATASE: 158 U/L (ref 45–117)
ALKALINE PHOSPHATASE: 161 U/L (ref 45–117)
ANION GAP: 6 (ref 5–15)
AST(SGOT): 37 U/L (ref 15–37)
AST(SGOT): 43 U/L (ref 15–37)
BILIRUB DIRECT SERPL-MCNC: 0.62 MG/DL (ref 0–0.3)
BUN SERPL-MCNC: 24 MG/DL (ref 7–18)
BUN/CREAT RATIO: 26.9 RATIO (ref 10–20)
CALCIUM SERPL-MCNC: 9.4 MG/DL (ref 8.5–10.1)
CARBON DIOXIDE: 24 MMOL/L (ref 21–32)
CHLORIDE: 114 MMOL/L (ref 98–107)
DEPRECATED RDW RBC: 57.5 FL (ref 35.1–43.9)
DIFFERENTIAL INDICATED: (no result)
ERYTHROCYTE [DISTWIDTH] IN BLOOD: 15.3 % (ref 11.6–14.6)
EST GLOM FILT RATE - AFR AMER: 104 ML/MIN (ref 60–?)
ESTIMATED CREATININE CLEARANCE: 64.93 ML/MIN
GLOBULIN: 2.8 G/DL (ref 2.2–4.2)
GLOBULIN: 2.8 G/DL (ref 2.2–4.2)
GLUCOSE: 98 MG/DL (ref 74–106)
HCT VFR BLD AUTO: 35.5 % (ref 40–54)
HGB BLD-MCNC: 11.2 G/DL (ref 13–16.5)
IMMATURE GRANULOCYTES COUNT: 0.03 X10^3/UL (ref 0–0)
LIPASE: 16 U/L (ref 13–75)
MANUAL DIF COMMENT BLD-IMP: (no result)
MCV RBC: 103.8 FL (ref 80–94)
MEAN CORP HGB CONC: 31.5 G/DL (ref 32–36)
MEAN PLATELET VOL.: 10.8 FL (ref 6.2–12)
NRBC FLAGGED BY ANALYZER: 0 % (ref 0–5)
PLATELET # BLD: 78 K/MM3 (ref 150–450)
POSITIVE COUNT: YES
POSITIVE DIFFERENTIAL: YES
POSITIVE MORPHOLOGY: YES
POTASSIUM: 4.6 MMOL/L (ref 3.5–5.1)
PTHIN: 104.1 PG/ML (ref 18.4–80.1)
RBC # BLD AUTO: 3.42 M/MM3 (ref 4.6–6.2)
SCAN SMEAR PER REVIEW CRITERIA: (no result)
TROPONIN-I HS: 33 PG/ML (ref 3–78)
WBC # BLD AUTO: 8.9 K/MM3 (ref 4.4–11)

## 2023-12-22 RX ADMIN — BUDESONIDE 0.5 MG: 0.5 SUSPENSION RESPIRATORY (INHALATION) at 06:55

## 2023-12-22 RX ADMIN — ALBUTEROL SULFATE 2.5 MG: 2.5 SOLUTION RESPIRATORY (INHALATION) at 19:02

## 2023-12-22 RX ADMIN — AZITHROMYCIN DIHYDRATE 250 MG: 500 INJECTION, POWDER, LYOPHILIZED, FOR SOLUTION INTRAVENOUS at 00:16

## 2023-12-22 RX ADMIN — Medication 128 MG: at 09:22

## 2023-12-22 RX ADMIN — ASPIRIN 81 MG: 81 TABLET, COATED ORAL at 09:22

## 2023-12-22 RX ADMIN — DIGOXIN 500 MCG: 250 INJECTION, SOLUTION INTRAMUSCULAR; INTRAVENOUS; PARENTERAL at 02:38

## 2023-12-22 RX ADMIN — TOLTERODINE TARTRATE 4 MG: 4 CAPSULE, EXTENDED RELEASE ORAL at 09:20

## 2023-12-22 RX ADMIN — SODIUM CHLORIDE, PRESERVATIVE FREE 0 ML: 5 INJECTION INTRAVENOUS at 21:12

## 2023-12-22 RX ADMIN — DILTIAZEM HYDROCHLORIDE 5 MG: 5 INJECTION INTRAVENOUS at 01:53

## 2023-12-22 RX ADMIN — SODIUM CHLORIDE, PRESERVATIVE FREE 0 ML: 5 INJECTION INTRAVENOUS at 02:40

## 2023-12-22 RX ADMIN — Medication 50 MG: at 09:23

## 2023-12-22 RX ADMIN — ALBUTEROL SULFATE 2.5 MG: 2.5 SOLUTION RESPIRATORY (INHALATION) at 06:54

## 2023-12-22 RX ADMIN — AZITHROMYCIN DIHYDRATE 250 MG: 500 INJECTION, POWDER, LYOPHILIZED, FOR SOLUTION INTRAVENOUS at 09:26

## 2023-12-22 RX ADMIN — BUDESONIDE 0.5 MG: 0.5 SUSPENSION RESPIRATORY (INHALATION) at 19:02

## 2023-12-22 NOTE — PCM.PN.HOSP
Subjective
Subjective
Doing well, feels better since he came into the hospital.

Objective Data
Objective Data
Vital Signs: 
Vital Signs

Temp Pulse Resp BP Pulse Ox O2 Del Method O2 Flow Rate
 98.7 F   76   16   112/76   95   Nasal Cannula   3 
 12/22/23 12:00  12/22/23 12:00  12/22/23 12:00  12/22/23 12:00  12/22/23 12:00  12/22/23 12:00  12/22/23 12:00



Oxygen Flow Rate (L/min)       3                                                
Oxygen Delivery Method         Nasal Cannula                                    
Weight:                        266 lb 12.149 oz                                 
Body Mass Index (BMI)          38.2                                             


Intake & Output: 
Intake and Output for Last 24 Hours

 12/21/23 12/22/23 12/23/23
 03:59 03:59 03:59
Intake Total  2050.58 / 2050.58 510 / 510
Balance  2050.58 / 2050.58 510 / 510


Lab / Micro Data

12/22/23 03:30          

12/22/23 03:30          

Labs: 
Laboratory Results - last 24 hr

12/21/23 21:56: WBC 11.4 H, RBC 4.26 L, Hgb 14.3, Hct 43.3, .6 H, MCH 33.6 H, MCHC 33.0, RDW Std Deviation 56.5 H, RDW Coeff of Ilia 15.1 H, Plt Count 131 L, MPV 10.7, Immature Gran % (Auto) 0.300, Neut % (Auto) 93.2 H, Lymph % (Auto) 1.8 L, 
Mono % (Auto) 4.1, Eos % (Auto) 0.2, Baso % (Auto) 0.4, Absolute Neuts (auto) 10.6 H, Absolute Lymphs (auto) 0.21 L, Nucleated RBC % 0, Differential Comment , D-Dimer Quant (PE/DVT) 3.03 H*, Sodium 142, Potassium 4.4, Chloride 110 H, Carbon Dioxide 
27.0, Anion Gap 5, BUN 22 H, Creatinine 0.88, Estim Creat Clear Calc 65.67, Est GFR (MDRD) Af Amer 106, Est GFR (MDRD) Non-Af 87, BUN/Creatinine Ratio 24.9 H, Glucose 96, Calcium 11.2 H, Troponin I High Sens 24, B-Natriuretic Peptide 270.4 H
12/22/23 00:43: Total Bilirubin 1.60 H, Direct Bilirubin 0.62 H, AST 37, ALT 28, Alkaline Phosphatase 161 H, Troponin I High Sens 33, Total Protein 4.8 L, Albumin 2.0 L, Globulin 2.8, Lipase 16
12/22/23 03:30: WBC 8.9, RBC 3.42 L, Hgb 11.2 L, Hct 35.5 L, .8 H, MCH 32.7 H, MCHC 31.5 L, RDW Std Deviation 57.5 H, RDW Coeff of Ilia 15.3 H, Plt Count 78 L, MPV 10.8, Immature Gran % (Auto) 0.300, Neut % (Auto) 91.5 H, Lymph % (Auto) 2.0 L, 
Mono % (Auto) 6.0, Eos % (Auto) 0.0, Baso % (Auto) 0.2, Absolute Neuts (auto) 8.1 H, Absolute Lymphs (auto) 0.18 L, Nucleated RBC % 0, Differential Comment SCANNED, Platelet Estimate MOD DEC, Sodium 144, Potassium 4.6, Chloride 114 H, Carbon Dioxide 
24.0, Anion Gap 6, BUN 24 H, Creatinine 0.89, Estim Creat Clear Calc 64.93, Est GFR (MDRD) Af Amer 104, Est GFR (MDRD) Non-Af 86, BUN/Creatinine Ratio 26.9 H, Glucose 98, Calcium 9.4, Total Bilirubin 1.50 H, AST 43 H, ALT 31, Alkaline Phosphatase 
158 H, Total Protein 5.0 L, Albumin 2.2 L, Globulin 2.8, Albumin/Globulin Ratio 0.8 L, PTH Intact 104.1 H


Radiography
Diagnostic Testing: 
Radiology Impression

Chest X-Ray  12/21/23 22:07
IMPRESSION:
 
Bilateral lower lobe airspace disease which may represent pneumonia.
 
Electronically Signed:
Amari King MD
2023/12/21 at 22:36 EST
Reading Location ID and State: [id number] / [place]
Tel [phone], Service support  [phone], Fax [phone]
 


Chest CTA  12/22/23 00:01
IMPRESSION:
 
1.  Negative for PE.
 
2.  No thoracic aortic dissection.
 
3.  Bilateral lower lobe pneumonia, left greater than right.
 
4.  Small areas of groundglass opacity in the upper lobes and right middle
lobe, which could be due to atelectasis or possibly Covid pneumonia.
 
5.  Bilateral pleural effusions, right greater than left.
 
6.  Hiatal hernia again noted.
 
7.  Findings of hepatic cirrhosis again noted with splenomegaly and
increasing ascites.
 
8. Cholelithiasis again noted.
 
Electronically Signed:
Arnulfo Rodriguez MD
2023/12/22 at 2:15 EST
Reading Location ID and State: [address] / [place]
Tel [phone], Service support  [phone], Fax [phone]
 




Physical Exam
Narrative
General: Alert, Oriented x3, Cooperative, No apparent distress
HEENT: Atraumatic, PERRLA, EOMI, Normocephalic
Oral: Moist Mucosa
Neck: Supple, No JVD
Lungs: Diminished, Normal air movement, No rhonchi, No wheeze, No rales
Cardiovascular: Regular rate, Regular Rhythm, Normal S1, Normal S2, No murmurs
Abdomen: Soft, Non Tender, Non-Distended, No Hepato-splenomegaly
Extremities: No edema, Capillary Refill Less than 3 Seconds
Skin: Chronic venous stasis changes with right lower extremity redness
Musculoskeletal: No Tenderness to Palpation of Joints or Extremities
Neurological: Cranial nerves II-XII grossly intact, Motor Exam 5/5 strength throughout, Sensory exam intact to light touch and pain
Psych/Mental Status: Normal Affect, Appropriate

Assessment & Plan
Assessment/Plan
(1) Paroxysmal A-fib: 
PLAN: 
Plan

1.  Bibasilar community-acquired pneumonia with hypoxia with right lower extremity cellulitis
? Continue with Rocephin and azithromycin
? She did have an elevated D-dimer and CTA of the chest was negative for PE
? Will consult PT/OT for evaluation and discharge planning

2.  Paroxysmal A-fib with RVR/HTN/HLD/aortic stenosis status post replacement/history of CVA/history of retinal artery occlusion
?Continue with his home blood pressure medications
? Continue with his home blood pressure medications, will monitor make adjustments as necessary
? Continue with his home cholesterol medications

3.  Anemia of chronic disease in setting of CKD 3
? Does have a history of polycystic kidney disease
? We will monitor renal function currently at baseline

4.  CÁRDENAS with a history of cirrhosis and previous liver cancer/GERD
? He does have stable ascites, will continue with his home medications
? Continue with his PPI

5.  Gout
? Stable
? Continue with his home allopurinol

6.  Anxiety/depression
? Stable
? Continue with home medications

DVT: Lovenox

Charges/Coding
Visit Charges
Inpatient E&M: 94463 Subs Hosp L2

## 2023-12-22 NOTE — CT_ITS
REPORT-ID:[id number] 
 
EXAM:  CT ANGIOGRAPHY CHEST WITHOUT AND WITH INTRAVENOUS CONTRAST 
 
CLINICAL INDICATION:  hypoxia 
 
TECHNIQUE:  Helically acquired angiography images were obtained of the 
chest without and with intravenous contrast.  This CT exam was performed 
using one or more of the following dose reduction techniques:  automated 
exposure control, adjustment of the mA and/or kV according to patient size, 
and/or use of iterative reconstruction technique.  MIP reconstructed images 
were created and reviewed. 
 
CONTRAST:  IV 100mL [id number] 
 
RADIATION DOSE:  Total DLP:   734.37 mGy-cm. 
 
COMPARISON:  Portable chest radiograph of 12/21/2023. CTA chest of 
8/23/2023. 
 
FINDINGS: 
 
PULMONARY ARTERIES:  Unremarkable.  Normal in caliber.  No evidence of 
pulmonary embolism. 
 
AORTA:  Unremarkable.  Normal in caliber.  No evidence of dissection. 
 
GREAT VESSELS OF AORTIC ARCH:  Unremarkable.  Normal in caliber.  No 
evidence of dissection. 
 
LUNGS AND PLEURAL SPACES:  Extensive asymmetric airspace disease has 
developed within the left lower lobe since the prior CT, with surrounding 
groundglass opacities and internal air bronchograms. A small associated 
left pleural effusion is present, slightly larger than on the prior study. 
 
A small -moderate right pleural effusion has developed. Patchy airspace 
disease is seen posteriorly within the right lower lobe, less severe than 
on the left. 
 
Pulmonary interstitial markings are less thickened than on the prior study 
with minimal subpleural interstitial thickening persisting bilaterally. 
Minimal subpleural groundglass opacities have developed within the left 
upper lobe, right middle lobe and posterior aspect of the right upper lobe. 
 
HEART:  Previous median sternotomy and CABG, along with aortic and mitral 
valve repair. Coronary artery calcification is present. No significant 
pericardial effusion. 
 
MEDIASTINUM:  Hiatal hernia is again demonstrated, measuring 6.7 cm in 
transverse diameter. No mediastinal or hilar adenopathy.  Esophagus is 
unremarkable. 
 
THYROID:  Unremarkable.  No thyroid lesions. 
 
BONES/JOINTS:  Subacute to old fracture of the right seventh rib again 
noted, and demonstrates further interval healing since prior study. There 
is accentuated thoracic kyphosis with lower thoracic degenerative spurring. 
Lower cervical degenerative disc disease is present. 
 
LYMPH NODES:  A borderline enlarged pretracheal lymph node is present, 
measuring 10 mm in short axis diameter, unchanged. Mild adenopathy has 
developed in the AP window since the prior study. No hilar adenopathy is 
identified. 
 
LIVER:  Findings of hepatic cirrhosis are again noted with associated 
splenomegaly. Moderate to large amount of ascites is present, greater than 
on the prior exam. Multiple small stones are again noted within the 
dependent portion of the gallbladder. Pancreas is atrophic. No 
pneumoperitoneum is noted. 
 
ORDER #: [id number] CT/CTA Chest W/WO Contrast  
IMPRESSION:  
 
  
1.  Negative for PE.  
 
  
2.  No thoracic aortic dissection.  
 
  
3.  Bilateral lower lobe pneumonia, left greater than right.  
 
  
4.  Small areas of groundglass opacity in the upper lobes and right middle  
lobe, which could be due to atelectasis or possibly Covid pneumonia.  
 
  
5.  Bilateral pleural effusions, right greater than left.  
 
  
6.  Hiatal hernia again noted.  
 
  
7.  Findings of hepatic cirrhosis again noted with splenomegaly and  
increasing ascites.  
 
  
8. Cholelithiasis again noted.  
 
  
Electronically Signed:  
Arnulfo Rodriguez MD  
2023/12/22 at 2:15 EST  
Reading Location ID and State: [address] / [place]  
Tel [phone], Service support  [phone], Fax [phone]

## 2023-12-22 NOTE — CASEMGMT
Per therapy patient needs to go to a skilled nursing facility. Patient was agreeable to [facility] and declined a list.  will send a referral to [facility] via [facility].

Jessica STEVENS

## 2023-12-22 NOTE — XMS RPT_ITS
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                            Onset:   
10-  
1                         2016                Chronic  
   
                                                    Coronary   
atherosclerosis and   
other heart disease  
(20 sources)                            Atherosclerotic heart   
disease of native   
coronary artery   
without angina   
pectoris;   
Translations:   
[Coronary   
atherosclerosis]                        Onset:   
10-  
1                         2021                Chronic  
   
                                                    Deficiency and other   
anemia  
(20 sources)                            Anemia of chronic   
disease; Translations:   
[Anemia in other   
chronic diseases   
classified elsewhere]                   Onset:   
  
7                         2019                Chronic  
   
                                                    Deficiency and other   
anemia  
(20 sources)                            Iron deficiency anemia   
due to blood loss;   
Translations: [Iron   
deficiency anemia   
secondary to blood   
loss (chronic)]                         Onset:   
  
3                         2023                Chronic  
   
                                                    Deficiency and other   
anemia  
(1 source)                              Iron deficiency anemia   
secondary to blood   
loss (chronic);   
Translations: [Iron   
deficiency anemia due   
to chronic blood loss]                  Onset:   
  
3                                                   Chronic  
   
                                                    Deficiency and other   
anemia  
(3 sources)                             Macrocytic anemia;   
Translations:   
[Nutritional anemia,   
unspecified]                                                Episodic  
   
                                                    Deficiency and other   
anemia  
(1 source)                              Iron deficiency   
anemia; Translations:   
[Iron deficiency   
anemia, unspecified]                                         Episodic  
   
                                                    Disorders of lipid   
metabolism  
(20 sources)                            Mixed hyperlipidemia;   
Translations: [Mixed   
hyperlipidemia]                         Onset:   
10-  
5                         2016                Chronic  
   
                                                    Esophageal disorders  
(20 sources)                            Gastroesophageal   
reflux disease;   
Translations:   
[Gastro-esophageal   
reflux disease without   
esophagitis]                            Onset:   
10-  
1                         2017                Chronic  
   
                                                    Essential hypertension  
(20 sources)                            Essential (primary)   
hypertension;   
Translations:   
[Essential   
hypertension]                           Onset:   
11-  
7                                                   Chronic  
   
                                                    Gastrointestinal   
hemorrhage  
(1 source)                              Melena; Translations:   
[Melena]                                10-          Episodic  
   
                                                    Genitourinary   
congenital anomalies  
(20 sources)                            Multiple renal cysts;   
Translations:   
[Polycystic kidney,   
unspecified]                            Onset:   
  
0                         2010                Chronic  
   
                                                    Genitourinary symptoms   
and ill-defined   
conditions  
(20 sources)                            Urge incontinence of   
urine; Translations:   
[Urge incontinence]                     Onset:   
  
9                         2019                Chronic  
   
                                                    Gout and other crystal   
arthropathies  
(20 sources)                            Gout; Translations:   
[Gout, unspecified]                     2017          Chronic  
   
                                                    Heart valve disorders  
(20 sources)                            Presence of prosthetic   
heart valve;   
Translations: [Aortic   
valve disorder]                         Onset:   
  
7                         2017                Chronic  
   
                                                    Neoplasms of   
unspecified nature or   
uncertain behavior  
(9 sources)                             Monoclonal gammopathy   
(clinical);   
Translations:   
[Monoclonal   
gammopathy]                             Onset:   
  
2                                                   Chronic  
   
                                                    Other and unspecified   
benign neoplasm  
(1 source)                              History of polyp of   
colon; Translations:   
[Personal history of   
colonic polyps]                                             Episodic  
   
                                                    Other connective   
tissue disease  
(1 source)                              Muscle weakness;   
Translations: [Muscle   
weakness   
(generalized)]                                              Episodic  
   
                                                    Other diseases of   
veins and lymphatics  
(1 source)                              Stasis dermatitis;   
Translations: [Venous   
insufficiency   
(chronic)   
(peripheral)]                                               Episodic  
   
                                                    Other diseases of   
veins and lymphatics  
(1 source)                              Skin ulcer of calf ;   
Translations: [Venous   
insufficiency   
(chronic)   
(peripheral)]                                               Episodic  
   
                                                    Other diseases of   
veins and lymphatics  
(1 source)                              Ulcer of lower   
extremity;   
Translations: [Venous   
insufficiency   
(chronic)   
(peripheral)]                                               Episodic  
   
                                                    Other endocrine   
disorders  
(2 sources)                             Hyperparathyroidism;   
Translations:   
[Hyperparathyroidism,   
unspecified]                                                Chronic  
   
                                                    Other gastrointestinal   
disorders  
(20 sources)                            Malabsorption - iron;   
Translations:   
[Intestinal   
malabsorption,   
unspecified]                            Onset:   
  
3                         2023                Chronic  
   
                                                    Other gastrointestinal   
disorders  
(1 source)                              Intestinal   
malabsorption,   
unspecified;   
Translations: [Iron   
malabsorption]                          Onset:   
  
3                                                   Chronic  
   
                                                    Other gastrointestinal   
disorders  
(1 source)                              H/O: liver disease;   
Translations:   
[Personal history of   
other diseases of the   
digestive system]                                           Episodic  
   
                                                    Other injuries and   
conditions due to   
external causes  
(1 source)                              History of fall;   
Translations: [History   
of falling]                                                 Episodic  
   
                                                    Other liver diseases  
(8 sources)                             Cirrhosis of liver;   
Translations:   
[Unspecified cirrhosis   
of liver]                                                   Chronic  
   
                                                    Other liver diseases  
(20 sources)                            Lesion of liver;   
Translations: [Liver   
disease, unspecified]                   Onset:   
  
3                                                   Chronic  
   
                                                    Other liver diseases  
(1 source)                              Disease of liver;   
Translations: [Liver   
disease, unspecified]                     10-          Chronic  
   
                                                    Other liver diseases  
(2 sources)                             Liver disease,   
unspecified;   
Translations: [Liver   
disease]                                Onset:   
  
3                                                   Chronic  
   
                                                    Other liver diseases  
(1 source)                              Unspecified cirrhosis   
of liver;   
Translations:   
[Cirrhosis of liver   
without ascites,   
unspecified hepatic   
cirrhosis type (HCC)]                   Onset:   
  
3                                                   Chronic  
   
                                                    Other liver diseases  
(3 sources)                             Liver mass;   
Translations:   
[Hepatomegaly, not   
elsewhere classified]                                         Episodic  
   
                                                    Other nervous system   
disorders  
(20 sources)                            Polyneuropathy;   
Translations:   
[Polyneuropathy,   
unspecified]                            Onset:   
  
5                         2017                Chronic  
   
                                                    Other nervous system   
disorders  
(1 source)                              Polyneuropathy,   
unspecified;   
Translations:   
[Peripheral   
polyneuropathy]                         Onset:   
  
7                                                   Chronic  
   
                                                    Other nutritional;   
endocrine; and   
metabolic disorders  
(20 sources)                            Obese class II;   
Translations:   
[Obesity, unspecified]                  Onset:   
  
1                         2021                Chronic  
   
                                                    Other nutritional;   
endocrine; and   
metabolic disorders  
(1 source)                              Metabolic syndrome X;   
Translations:   
[Metabolic syndrome]                                         Chronic  
   
                                                    Other nutritional;   
endocrine; and   
metabolic disorders  
(1 source)                              Hypercalcemia;   
Translations:   
[Hypercalcemia]                                             Chronic  
   
                                                    Other nutritional;   
endocrine; and   
metabolic disorders  
(13 sources)                            Obese class I;   
Translations:   
[Obesity, unspecified]                  Onset:   
  
3                         2023                Chronic  
   
                                                    Other nutritional;   
endocrine; and   
metabolic disorders  
(1 source)                              Obesity, unspecified;   
Translations:   
[Obesity, Class I, BMI   
30-34.9]                                Onset:   
  
3                                                   Chronic  
   
                                                    Other nutritional;   
endocrine; and   
metabolic disorders  
(1 source)                              Hypercalcemia;   
Translations:   
[Hypercalcemia]                         Onset:   
  
3                                                   Chronic  
   
                                                    Other skin disorders  
(1 source)                              Eruption;   
Translations: [Rash   
and other nonspecific   
skin eruption]                                              Episodic  
   
                                                    Paralysis  
(2 sources)                             Hemiplegia,   
unspecified affecting   
left nondominant side;   
Translations:   
[Hemiplegia,   
unspecified affecting   
left nondominant side   
(HCC)]                                  Onset:   
01-  
3                                                   Chronic  
   
                                                    Residual codes;   
unclassified  
(20 sources)                            Obstructive sleep   
apnea syndrome;   
Translations:   
[Obstructive sleep   
apnea (adult)   
(pediatric)]                            Onset:   
10-  
5                         2011                Chronic  
   
                                                    Residual codes;   
unclassified  
(1 source)                              Edema of lower   
extremity;   
Translations:   
[Localized edema]                                           Episodic  
   
                                                    Skin and subcutaneous   
tissue infections  
(2 sources)                             Site-specific   
infective disorders of   
skin; Translations:   
[Local infection of   
the skin and   
subcutaneous tissue,   
unspecified]                                                Episodic  
   
                                                    Superficial injury;   
contusion  
(1 source)                              Contusion of hip;   
Translations:   
[Contusion of   
unspecified hip,   
subsequent encounter]                                         Episodic  
   
                                                    Unclassified  
(1 source)                Unknown / UNK(Unknown)    Onset:   
11-  
7                                                     
   
                                                    Unclassified  
(1 source)                              Radiotherapy   
On-treatment Visit                      Onset:   
  
3                                                     
  
  
Past or Other Problems  
  
  
                      Problem Classification Problem    Date       Documented Da  
te Episodic/Chronic  
   
                                                    Deficiency and other   
anemia  
(1 source)                              Nutritional anemia,   
unspecified;   
Translations:   
[Macrocytic anemia]                     Onset:   
2022                                          Episodic  
   
                                                    Diabetes mellitus   
without complication  
(20 sources)                            Impaired fasting   
glycemia;   
Translations:   
[Impaired fasting   
glucose]                                Onset:   
2006                Episodic  
   
                                                    Other and unspecified   
benign neoplasm  
(20 sources)                            Adenomatous polyp   
of colon ;   
Translations:   
[Benign neoplasm of   
colon, unspecified]                     Onset:   
2011                Episodic  
   
                                                    Other and unspecified   
benign neoplasm  
(1 source)                              Personal history of   
colonic polyps;   
Translations:   
[History of colonic   
polyps]                                 Onset:   
2023                                          Episodic  
   
                                                    Other connective tissue   
disease  
(20 sources)                            Recurrent falls ;   
Translations:   
[Repeated falls]                        Onset:   
10-                                          Episodic  
   
                                                    Other connective tissue   
disease  
(1 source)                              Repeated falls;   
Translations:   
[Frequent falls]                        Onset:   
10-                                          Episodic  
   
                                                    Other gastrointestinal   
disorders  
(20 sources)                            Splenomegaly;   
Translations:   
[Splenomegaly, not   
elsewhere   
classified]                             Onset:   
09-                02-                Episodic  
   
                                                    Other gastrointestinal   
disorders  
(1 source)                              Splenomegaly, not   
elsewhere   
classified;   
Translations:   
[Splenomegaly]                          Onset:   
2015                                          Episodic  
   
                                                    Other liver diseases  
(1 source)                              Hepatomegaly, not   
elsewhere   
classified;   
Translations:   
[Liver mass]                            Onset:   
2023                                          Episodic  
   
                                                    Other nervous system   
disorders  
(20 sources)                            Abnormal gait;   
Translations:   
[Unspecified   
abnormalities of   
gait and mobility]                      Onset:   
2016                Episodic  
   
                                                    Other nervous system   
disorders  
(1 source)                              Unspecified   
abnormalities of   
gait and mobility;   
Translations:   
[Abnormality of   
gait]                                   Onset:   
2016                                          Episodic  
   
                                                    Residual codes;   
unclassified  
(1 source)                              Other specified   
personal risk   
factors, not   
elsewhere   
classified;   
Translations: [At   
risk for   
polypharmacy]                           Onset:   
2023                                          Episodic  
   
                                                    Varicose veins of lower   
extremity  
(20 sources)                            Skin ulcer;   
Translations:   
[Varicose veins of   
unspecified lower   
extremity with   
ulcer of   
unspecified site]                       Onset:   
2023                Episodic  
  
  
  
Results  
  
  
                      Test Name  Value      Interpretation Reference Range Facil  
ity  
  
  
  
                                          
   
                                          
   
                                          
   
                                          
   
                                          
   
                                          
   
                                          
   
                                          
   
                                          
   
                                          
   
                                          
   
                                          
   
                                          
   
                                          
   
                                          
   
                                          
   
                                          
   
                                          
   
                                          
   
                                          
   
                                          
   
                                          
   
                                          
   
                                          
   
                                          
   
                                          
   
                                          
   
                                          
   
                                          
   
                                          
   
                                          
   
                                          
   
                                          
   
                                          
   
                                          
   
                                          
   
                                          
   
                                          
   
                                          
   
                                          
   
                                          
   
                                          
   
                                          
   
                                          
   
                                          
   
                                          
   
                                          
   
                                          
   
                                          
   
                                          
   
                                          
   
                                          
   
                                          
   
                                          
   
                                          
   
                                          
   
                                          
   
                                          
   
                                          
   
                                          
   
                                          
   
                                          
   
                                          
   
                                          
   
                                          
   
                                          
   
                                          
   
                                          
   
                                          
   
                                          
   
                                          
   
                                          
   
                                          
   
                                          
   
                                          
   
                                          
   
                                          
   
                                          
   
                                          
   
                                          
   
                                          
   
                                          
   
                                          
   
                                          
   
                                          
   
                                          
   
                                          
   
                                          
   
                                          
   
                                          
   
                                          
   
                                          
   
                                          
   
                                          
   
                                          
   
                                          
   
                                          
   
                                          
   
                                          
   
                                          
   
                                          
   
                                          
   
                                          
   
                                          
   
                                          
   
                                          
   
                                          
   
                                          
   
                                          
   
                                          
   
                                          
   
                                          
   
                                          
   
                                          
   
                                          
   
                                          
   
                                          
   
                                          
   
                                          
   
                                          
   
                                          
   
                                          
   
                                          
   
                                          
   
                                          
   
                                          
   
                                          
   
                                          
   
                                          
   
                                          
   
                                          
   
                                          
   
                                          
   
                                          
   
                                          
   
                                          
   
                                          
   
                                          
   
                                          
   
                                          
   
                                          
   
                                          
   
                                          
   
                                          
   
                                          
   
                                          
   
                                          
   
                                          
   
                                          
   
                                          
   
                                          
   
                                          
   
                                          
   
                                          
   
                                          
   
                                          
   
                                          
   
                                          
   
                                          
   
                                          
   
                                          
   
                                          
   
                                          
   
                                          
   
                                          
   
                                          
   
                                          
   
                                          
   
                                          
   
                                          
   
                                          
   
                                          
   
                                          
   
                                          
   
                                          
   
                                          
   
                                          
   
                                          
   
                                          
   
                                          
   
                                          
   
                                          
   
                                          
   
                                          
   
                                          
   
                                          
   
                                          
   
                                          
   
                                          
   
                                          
   
                                          
   
                                          
   
                                          
   
                                          
   
                                          
   
                                          
   
                                          
   
                                          
   
                                          
   
                                          
   
                                          
   
                                          
   
                                          
   
                                          
   
                                          
   
                                          
   
                                          
   
                                          
   
                                          
   
                                          
   
                                          
   
                                          
   
                                          
   
                                          
   
                                          
   
                                          
   
                                          
   
                                          
   
                                          
   
                                          
   
                                          
   
                                          
   
                                          
   
                                          
   
                                          
   
                                          
   
                                          
   
                                          
   
                                          
   
                                          
   
                                          
   
                                          
   
                                          
   
                                          
   
                                          
   
                                          
   
                                          
   
                                          
   
                                          
   
                                          
   
                                          
   
                                          
   
                                          
   
                                          
   
                                          
   
                                          
   
                                          
   
                                          
   
                                          
   
                                          
   
                                          
   
                                          
   
                                          
   
                                          
   
                                          
   
                                          
   
                                          
   
                                          
   
                                          
   
                                          
   
                                          
   
                                          
   
                                          
   
                                          
   
                                          
   
                                          
   
                                          
   
                                          
   
                                          
   
                                          
   
                                          
   
                                          
   
                                          
   
                                          
   
                                          
   
                                          
   
                                          
   
                                          
   
                                          
   
                                          
   
                                          
   
                                          
   
                                          
   
                                          
   
                                          
   
                                          
   
                                          
   
                                          
   
                                          
   
                                          
   
                                          
   
                                          
   
                                          
   
                                          
   
                                          
   
                                          
   
                                          
   
                                          
   
                                          
   
                                          
   
                                          
   
                                          
   
                                          
   
                                          
   
                                          
   
                                          
   
                                          
   
                                          
   
                                          
   
                                          
   
                                          
   
                                          
   
                                          
   
                                          
   
                                          
   
                                          
   
                                          
   
                                          
   
                                          
   
                                          
   
                                          
   
                                          
   
                                          
   
                                          
   
                                          
   
                                          
   
                                          
   
                                          
   
                                          
   
                                          
   
                                          
   
                                          
   
                                          
   
                                          
   
                                          
   
                                          
   
                                          
   
                                          
   
                                          
   
                                          
   
                                          
   
                                          
   
                                          
   
                                          
   
                                          
   
                                          
   
                                          
   
                                          
   
                                          
   
                                          
   
                                          
   
                                          
   
                                          
   
                                          
   
                                          
   
                                          
   
                                          
   
                                          
   
                                          
   
                                          
   
                                          
   
                                          
   
                                          
   
                                          
   
                                          
   
                                          
   
                                          
   
                                          
   
                                          
   
                                          
   
                                          
   
                                          
   
                                          
   
                                          
   
                                          
   
                                          
   
                                          
   
                                          
   
                                          
   
                                          
   
                                          
   
                                          
   
                                          
   
                                          
   
                                          
   
                                          
   
                                          
   
                                          
   
                                          
   
                                          
   
                                          
   
                                          
   
                                          
   
                                          
   
                                          
   
                                          
   
                                          
   
                                          
   
                                          
   
                                          
   
                                          
   
                                          
   
                                          
   
                                          
   
                                          
   
                                          
   
                                          
   
                                          
   
                                          
   
                                          
   
                                          
   
                                          
   
                                          
   
                                          
   
                                          
   
                                          
   
                                          
   
                                          
   
                                          
   
                                          
   
                                          
   
                                          
   
                                          
   
                                          
   
                                          
   
                                          
   
                                          
   
                                          
   
                                          
   
                                          
   
                                          
   
                                          
   
                                          
   
                                          
   
                                          
   
                                          
   
                                          
   
                                          
   
                                          
   
                                          
   
                                          
   
                                          
   
                                          
   
                                          
   
                                          
   
                                          
   
                                          
   
                                          
   
                                          
   
                                          
   
                                          
   
                                          
   
                                          
   
                                          
   
                                          
   
                                          
   
                                          
   
                                          
   
                                          
   
                                          
   
                                          
   
                                          
   
                                          
   
                                          
   
                                          
   
                                          
   
                                          
   
                                          
   
                                          
   
                                          
   
                                          
   
                                          
   
                                          
   
                                          
   
                                          
   
                                          
   
                                          
   
                                          
   
                                          
   
                                          
   
                                          
   
                                          
   
                                          
   
                                          
   
                                          
   
                                          
   
                                          
   
                                          
   
                                          
   
                                          
   
                                          
   
                                          
   
                                          
   
                                          
   
                                          
   
                                          
   
                                          
   
                                          
   
                                          
   
                                          
   
                                          
   
                                          
   
                                          
   
                                          
   
                                          
   
                                          
   
                                          
   
                                          
   
                                          
   
                                          
   
                                          
   
                                          
   
                                          
   
                                          
   
                                          
   
                                          
   
                                          
   
                                          
   
                                          
   
                                          
   
                                          
   
                                          
   
                                          
   
                                          
   
                                          
   
                                          
   
                                          
   
                                          
   
                                          
   
                                          
   
                                          
   
                                          
   
                                          
   
                                          
   
                                          
   
                                          
   
                                          
   
                                          
   
                                          
   
                                          
   
                                          
   
                                          
   
                                          
   
                                          
   
                                          
   
                                          
   
                                          
   
                                          
   
                                          
   
                                          
   
                                          
   
                                          
   
                                          
   
                                          
   
                                          
   
                                          
   
                                          
   
                                          
   
                                          
   
                                          
   
                                          
   
                                          
   
                                          
   
                                          
   
                                          
   
                                          
   
                                          
   
                                          
   
                                          
   
                                          
   
                                          
   
                                          
   
                                          
   
                                          
   
                                          
   
                                          
   
                                          
   
                                          
   
                                          
   
                                          
   
                                          
   
                                          
   
                                          
   
                                          
   
                                          
   
                                          
   
                                          
   
                                          
   
                                          
   
                                          
   
                                          
   
                                          
   
                                          
   
                                          
   
                                          
   
                                          
   
                                          
   
                                          
   
                                          
   
                                          
   
                                          
   
                                          
   
                                          
   
                                          
   
                                          
   
                                          
   
                                          
   
                                          
   
                                          
   
                                          
   
                                          
   
                                          
   
                                          
   
                                          
   
                                          
   
                                          
   
                                          
   
                                          
   
                                          
   
                                          
   
                                          
   
                                          
   
                                          
   
                                          
   
                                          
   
                                          
   
                                          
   
                                          
   
                                          
   
                                          
   
                                          
   
                                          
   
                                          
   
                                          
   
                                          
   
                                          
   
                                          
   
                                          
   
                                          
   
                                          
   
                                          
   
                                          
   
                                          
   
                                          
   
                                          
   
                                          
   
                                          
   
                                          
   
                                          
   
                                          
   
                                          
   
                                          
   
                                          
   
                                          
   
                                          
   
                                          
   
                                          
   
                                          
   
                                          
   
                                          
   
                                          
   
                                          
   
                                          
   
                                          
   
                                          
   
                                          
   
                                          
   
                                          
   
                                          
   
                                          
   
                                          
   
                                          
   
                                          
   
                                          
   
                                          
   
                                          
   
                                          
   
                                          
   
                                          
   
                                          
   
                                          
   
                                          
   
                                          
   
                                          
   
                                          
   
                                          
   
                                          
   
                                          
   
                                          
   
                                          
   
                                          
   
                                          
   
                                          
   
                                          
   
                                          
   
                                          
   
                                          
   
                                          
   
                                          
   
                                          
   
                                          
   
                                          
   
                                          
   
                                          
   
                                          
   
                                          
   
                                          
   
                                          
   
                                          
   
                                          
   
                                          
   
                                          
   
                                          
   
                                          
   
                                          
   
                                          
   
                                          
   
                                          
   
                                          
   
                                          
   
                                          
   
                                          
   
                                          
   
                                          
   
                                          
   
                                          
   
                                          
   
                                          
   
                                          
   
                                          
   
                                          
   
                                          
   
                                          
   
                                          
   
                                          
   
                                          
   
                                          
   
                                          
   
                                          
   
                                          
   
                                          
   
                                          
   
                                          
   
                                          
   
                                          
   
                                          
   
                                          
   
                                          
   
                                          
   
                                          
   
                                          
   
                                          
   
                                          
   
                                          
   
                                          
   
                                          
   
                                          
   
                                          
   
                                          
   
                                          
   
                                          
   
                                          
   
                                          
   
                                          
   
                                          
   
                                          
   
                                          
   
                                          
   
                                          
   
                                          
   
                                          
   
                                          
   
                                          
   
                                          
   
                                          
   
                                          
   
                                          
   
                                          
   
                                          
   
                                          
   
                                          
   
                                          
   
                                          
   
                                          
   
                                          
   
                                          
   
                                          
   
                                          
   
                                          
   
                                          
   
                                          
   
                                          
   
                                          
   
                                          
   
                                          
   
                                          
   
                                          
   
                                          
   
                                          
   
                                          
   
                                          
   
                                          
   
                                          
   
                                          
   
                                          
   
                                          
   
                                          
   
                                          
   
                                          
   
                                          
   
                                          
   
                                          
   
                                          
   
                                          
   
                                          
   
                                          
   
                                          
   
                                          
   
                                          
   
                                          
   
                                          
   
                                          
   
                                          
   
                                          
   
                                          
   
                                          
   
                                          
   
                                          
   
                                          
   
                                          
   
                                          
   
                                          
   
                                          
   
                                          
   
                                          
   
                                          
   
                                          
   
                                          
   
                                          
   
                                          
   
                                          
   
                                          
   
                                          
   
                                          
   
                                          
   
                                          
   
                                          
   
                                          
   
                                          
   
                                          
   
                                          
   
                                          
   
                                          
   
                                          
   
                                          
   
                                          
   
                                          
   
                                          
   
                                          
   
                                          
   
                                          
   
                                          
   
                                          
   
                                          
   
                                          
   
                                          
   
                                          
   
                                          
   
                                          
   
                                          
   
                                          
   
                                          
   
                                          
   
                                          
   
                                          
   
                                          
   
                                          
   
                                          
   
                                          
   
                                          
   
                                          
   
                                          
   
                                          
   
                                          
   
                                          
   
                                          
   
                                          
   
                                          
   
                                          
   
                                          
   
                                          
   
                                          
   
                                          
   
                                          
   
                                          
   
                                          
   
                                          
   
                                          
   
                                          
   
                                          
   
                                          
   
                                          
   
                                          
   
                                          
   
                                          
   
                                          
   
                                          
   
                                          
   
                                          
   
                                          
   
                                          
   
                                          
   
                                          
   
                                          
   
                                          
   
                                          
   
                                          
   
                                          
   
                                          
   
                                          
   
                                          
   
                                          
   
                                          
   
                                          
   
                                          
   
                                          
   
                                          
   
                                          
   
                                          
   
                                          
   
                                          
   
                                          
   
                                          
   
                                          
   
                                          
   
                                          
   
                                          
   
                                          
   
                                          
   
                                          
   
                                          
   
                                          
   
                                          
   
                                          
   
                                          
   
                                          
   
                                          
   
                                          
   
                                          
   
                                          
   
                                          
   
                                          
   
                                          
   
                                          
   
                                          
   
                                          
   
                                          
   
                                          
   
                                          
   
                                          
   
                                          
   
                                          
   
                                          
   
                                          
   
                                          
   
                                          
   
                                          
   
                                          
   
                                          
   
                                          
   
                                          
   
                                          
   
                                          
   
                                          
   
                                          
   
                                          
   
                                          
   
                                          
   
                                          
   
                                          
   
                                          
   
                                          
   
                                          
   
                                          
   
                                          
   
                                          
   
                                          
   
                                          
   
                                          
   
                                          
   
                                          
   
                                          
   
                                          
   
                                          
   
                                          
   
                                          
   
                                          
   
                                          
   
                                          
   
                                          
   
                                          
   
                                          
   
                                          
   
                                          
   
                                          
   
                                          
   
                                          
   
                                          
   
                                          
   
                                          
   
                                          
   
                                          
   
                                          
   
                                          
   
                                          
   
                                          
   
                                          
   
                                          
   
                                          
   
                                          
   
                                          
   
                                          
   
                                          
   
                                          
   
                                          
   
                                          
   
                                          
   
                                          
   
                                          
   
                                          
   
                                          
   
                                          
   
                                          
   
                                          
   
                                          
   
                                          
   
                                          
   
                                          
   
                                          
   
                                          
   
                                          
   
                                          
   
                                          
   
                                          
   
                                          
   
                                          
   
                                          
   
                                          
   
                                          
   
                                          
   
                                          
   
                                          
   
                                          
   
                                          
   
                                          
   
                                          
   
                                          
   
                                          
   
                                          
   
                                          
   
                                          
   
                                          
   
                                          
   
                                          
   
                                          
   
                                          
   
                                          
   
                                          
   
                                          
   
                                          
   
                                          
   
                                          
   
                                          
   
                                          
   
                                          
   
                                          
   
                                          
   
                                          
   
                                          
   
                                          
   
                                          
   
                                          
   
                                          
   
                                          
   
                                          
   
                                          
   
                                          
   
                                          
   
                                          
   
                                          
   
                                          
   
                                          
   
                                          
   
                                          
   
                                          
   
                                          
   
                                          
   
                                          
   
                                          
   
                                          
   
                                          
   
                                          
   
                                          
   
                                          
   
                                          
   
                                          
   
                                          
   
                                          
   
                                          
   
                                          
   
                                          
   
                                          
   
                                          
   
                                          
   
                                          
   
                                          
   
                                          
   
                                          
   
                                          
   
                                          
   
                                          
   
                                          
   
                                          
   
                                          
   
                                          
   
                                          
   
                                          
   
                                          
   
                                          
   
                                          
   
                                          
   
                                          
   
                                          
   
                                          
   
                                          
   
                                          
   
                                          
   
                                          
   
                                          
   
                                          
   
                                          
   
                                          
   
                                          
   
                                          
   
                                          
   
                                          
   
                                          
   
                                          
   
                                          
   
                                          
   
                                          
   
                                          
   
                                          
   
                                          
   
                                          
   
                                          
   
                                          
   
                                          
   
                                          
   
                                          
   
                                          
   
                                          
   
                                          
   
                                          
   
                                          
   
                                          
   
                                          
   
                                          
   
                                          
   
                                          
   
                                          
   
                                          
   
                                          
   
                                          
   
                                          
   
                                          
   
                                          
   
                                          
   
                                          
   
                                          
   
                                          
   
                                          
   
                                          
   
                                          
   
                                          
   
                                          
   
                                          
   
                                          
   
                                          
   
                                          
   
                                          
   
                                          
   
                                          
   
                                          
   
                                          
   
                                          
   
                                          
   
                                          
   
                                          
   
                                          
   
                                          
   
                                          
   
                                          
   
                                          
   
                                          
   
                                          
   
                                          
   
                                          
   
                                          
   
                                          
   
                                          
   
                                          
   
                                          
   
                                          
   
                                          
   
                                          
   
                                          
   
                                          
   
                                          
   
                                          
   
                                          
   
                                          
   
                                          
   
                                          
   
                                          
   
                                          
   
                                          
   
                                          
   
                                          
   
                                          
   
                                          
   
                                          
   
                                          
   
                                          
   
                                          
   
                                          
   
                                          
   
                                          
   
                                          
   
                                          
   
                                          
   
                                          
   
                                          
   
                                          
   
                                          
   
                                          
   
                                          
   
                                          
   
                                          
   
                                          
   
                                          
   
                                          
   
                                          
   
                                          
   
                                          
   
                                          
   
                                          
   
                                          
   
                                          
   
                                          
   
                                          
   
                                          
   
                                          
   
                                          
   
                                          
   
                                          
   
                                          
   
                                          
   
                                          
   
                                          
   
                                          
   
                                          
   
                                          
   
                                          
   
                                          
   
                                          
   
                                          
   
                                          
   
                                          
   
                                          
   
                                          
   
                                          
   
                                          
   
                                          
   
                                          
   
                                          
   
                                          
   
                                          
   
                                          
   
                                          
   
                                          
   
                                          
   
                                          
   
                                          
   
                                          
   
                                          
   
                                          
   
                                          
   
                                          
   
                                          
   
                                          
   
                                          
   
                                          
   
                                          
   
                                          
   
                                          
   
                                          
   
                                          
   
                                          
   
                                          
   
                                          
   
                                          
   
                                          
   
                                          
   
                                          
   
                                          
   
                                          
   
                                          
   
                                          
   
                                          
   
                                          
   
                                          
   
                                          
   
                                          
   
                                          
   
                                          
   
                                          
   
                                          
   
                                          
   
                                          
   
                                          
   
                                          
   
                                          
   
                                          
   
                                          
   
                                          
   
                                          
   
                                          
   
                                          
   
                                          
   
                                          
   
                                          
   
                                          
   
                                          
   
                                          
   
                                          
   
                                          
   
                                          
   
                                          
   
                                          
   
                                          
   
                                          
   
                                          
   
                                          
   
                                          
   
                                          
   
                                          
   
                                          
   
                                          
   
                                          
   
                                          
   
                                          
   
                                          
   
                                          
   
                                          
   
                                          
   
                                          
   
                                          
   
                                          
   
                                          
   
                                          
   
                                          
   
                                          
   
                                          
   
                                          
   
                                          
   
                                          
   
                                          
   
                                          
   
                                          
   
                                          
   
                                          
   
                                          
   
                                          
   
                                          
   
                                          
   
                                          
   
                                          
   
                                          
   
                                          
   
                                          
   
                                          
   
                                          
   
                                          
   
                                          
   
                                          
   
                                          
   
                                          
   
                                          
   
                                          
   
                                          
   
                                          
   
                                          
   
                                          
   
                                          
   
                                          
   
                                          
   
                                          
   
                                          
   
                                          
   
                                          
   
                                          
   
                                          
   
                                          
   
                                          
   
                                          
   
                                          
   
                                          
   
                                          
   
                                          
   
                                          
   
                                          
   
                                          
   
                                          
   
                                          
   
                                          
   
                                          
   
                                          
   
                                          
   
                                          
   
                                          
   
                                          
   
                                          
   
                                          
   
                                          
   
                                          
   
                                          
   
                                          
   
                                          
   
                                          
   
                                          
   
                                          
   
                                          
   
                                          
   
                                          
   
                                          
   
                                          
   
                                          
   
                                          
   
                                          
   
                                          
   
                                          
   
                                          
   
                                          
   
                                          
   
                                          
   
                                          
   
                                          
   
                                          
   
                                          
   
                                          
   
                                          
   
                                          
   
                                          
   
                                          
   
                                          
   
                                          
   
                                          
   
                                          
   
                                          
   
                                          
   
                                          
   
                                          
   
                                          
   
                                          
   
                                          
   
                                          
   
                                          
   
                                          
   
                                          
   
                                          
   
                                          
   
                                          
   
                                          
   
                                          
   
                                          
   
                                          
   
                                          
   
                                          
   
                                          
   
                                          
   
                                          
   
                                          
   
                                          
   
                                          
   
                                          
   
                                          
   
                                          
   
                                          
   
                                          
  
  
  
Vital Signs  
  
  
                      Date Time  Vital Sign Value      [facility]  
[facility]  
   
                                                    10-   
14:          Body height         177.8 cm            Juanito Esparza MD  
Work Phone:   
[phone]                          [facility]  
   
                                                    10-   
14:          Body temperature    97.81 [degF]        Juanito Esparza MD  
Work Phone:   
[phone]                          [facility]  
   
                                                    10-   
14:          Body weight         111.86 kg           Juanito Esparza MD  
Work Phone:   
[phone]                          [facility]  
   
                                                    10-   
14:                              Diastolic blood   
pressure                  89 mm[Hg]                 Juanito Esparza MD  
Work Phone:   
[phone]                          [facility]  
   
                                                    10-   
14:          Heart rate          129 /min            Juanito Esparza MD  
Work Phone:   
[phone]                          [facility]  
   
                                                    10-   
14:                              SaO2% (BldA) [Mass   
fraction]                 98 %                      Juanito Esparza MD  
Work Phone:   
[phone]                          [facility]  
   
                                                    10-   
14:                              Systolic blood   
pressure                  118 mm[Hg]                Juanito Esparza MD  
Work Phone:   
[phone]                          [facility]  
   
                                                    2023   
09:          Body temperature    97.5 [degF]         [facility]  
Work Phone:   
[phone]                          [facility]  
   
                                                    2023   
09:                              Diastolic blood   
pressure                  79 mm[Hg]                 [facility]  
Work Phone:   
[phone]                          [facility]  
   
                                                    2023   
09:          Heart rate          100 /min            [facility]  
Work Phone:   
[phone]                          [facility]  
   
                                                    2023   
09:                              Systolic blood   
pressure                  131 mm[Hg]                [facility]  
Work Phone:   
[phone]                          [facility]  
   
                                                    2023   
11:          Body temperature    97 [degF]           [facility]  
Work Phone:   
[phone]                          [facility]  
   
                                                    2023   
11:                              Diastolic blood   
pressure                  77 mm[Hg]                 [facility]  
Work Phone:   
[phone]                          [facility]  
   
                                                    2023   
11:          Heart rate          70 /min             [facility]  
Work Phone:   
[phone]                          [facility]  
   
                                                    2023   
11:                              Systolic blood   
pressure                  127 mm[Hg]                [facility]  
Work Phone:   
[phone]                          [facility]  
   
                                                    2023   
09:          Body temperature    97.9 [degF]         [facility]  
Work Phone:   
[phone]                          [facility]  
   
                                                    2023   
09:                              Diastolic blood   
pressure                  89 mm[Hg]                 [facility]  
Work Phone:   
[phone]                          [facility]  
   
                                                    2023   
09:          Heart rate          71 /min             [facility]  
Work Phone:   
[phone]                          [facility]  
   
                                                    2023   
09:                              Systolic blood   
pressure                  133 mm[Hg]                [facility]  
Work Phone:   
[phone]                          [facility]  
   
                                                    2023   
09:          Body temperature    98.6 [degF]         [facility]  
Work Phone:   
[phone]                          [facility]  
   
                                                    2023   
09:                              Diastolic blood   
pressure                  65 mm[Hg]                 [facility]  
Work Phone:   
[phone]                          [facility]  
   
                                                    2023   
09:          Heart rate          65 /min             [facility]  
Work Phone:   
[phone]                          [facility]  
   
                                                    2023   
09:                              SaO2% (BldA) [Mass   
fraction]                 95 %                      [facility]  
Work Phone:   
[phone]                          [facility]  
   
                                                    2023   
09:                              Systolic blood   
pressure                  108 mm[Hg]                [facility]  
Work Phone:   
[phone]                          [facility]  
   
                                                    2023   
09:          Body temperature    97.9 [degF]         [facility]  
Work Phone:   
[phone]                          [facility]  
   
                                                    2023   
09:                              Diastolic blood   
pressure                  68 mm[Hg]                 [facility]  
Work Phone:   
[phone]                          [facility]  
   
                                                    2023   
09:          Heart rate          60 /min             [facility]  
Work Phone:   
[phone]                          [facility]  
   
                                                    2023   
09:                              SaO2% (BldA) [Mass   
fraction]                 98 %                      [facility]  
Work Phone:   
[phone]                          [facility]  
   
                                                    2023   
09:                              Systolic blood   
pressure                  107 mm[Hg]                [facility]  
Work Phone:   
[phone]                          [facility]  
   
                                                    2023   
15:          Body height         171 cm              Tello Masci DO  
Work Phone:   
[phone]                          [facility]  
   
                                                    2023   
15:          Body temperature    97.9 [degF]         Tello Masci DO  
Work Phone:   
[phone]                          [facility]  
   
                                                    2023   
15:          Body weight         109.77 kg           Tello Masci DO  
Work Phone:   
[phone]                          [facility]  
   
                                                    2023   
15:                              Diastolic blood   
pressure                  63 mm[Hg]                 Tello Masci DO  
Work Phone:   
[phone]                          [facility]  
   
                                                    2023   
15:          Heart rate          70 /min             Tello Masci DO  
Work Phone:   
[phone]                          [facility]  
   
                                                    2023   
15:                              SaO2% (BldA) [Mass   
fraction]                 100 %                     Tello Ortega DO  
Work Phone:   
[phone]                          [facility]  
   
                                                    2023   
15:                              Systolic blood   
pressure                  108 mm[Hg]                Tello Ortega DO  
Work Phone:   
[phone]                          [facility]  
   
                                                    2023   
14:          Body temperature    97.9 [degF]         [facility]  
Work Phone:   
[phone]                          [facility]  
   
                                                    2023   
14:          Body weight         107.32 kg           [facility]  
Work Phone:   
[phone]                          [facility]  
   
                                                    2023   
14:                              Diastolic blood   
pressure                  65 mm[Hg]                 [facility]  
Work Phone:   
[phone]                          [facility]  
   
                                                    2023   
14:          Heart rate          75 /min             [facility]  
Work Phone:   
[phone]                          [facility]  
   
                                                    2023   
14:          Respiratory rate    16 /min             [facility]  
Work Phone:   
[phone]                          [facility]  
   
                                                    2023   
14:                              SaO2% (BldA) [Mass   
fraction]                 96 %                      [facility]  
Work Phone:   
[phone]                          [facility]  
   
                                                    2023   
14:                              Systolic blood   
pressure                  111 mm[Hg]                [facility]  
Work Phone:   
[phone]                          [facility]  
   
                                                    2023   
09:          Body height         177.8 cm            Juanito Esparza MD  
Work Phone:   
[phone]                          [facility]  
   
                                                    2023   
09:          Body temperature    97.3 [degF]         Juanito Esparza MD  
Work Phone:   
[phone]                          [facility]  
   
                                                    2023   
09:          Body weight         111.95 kg           Juanito Esparza MD  
Work Phone:   
[phone]                          [facility]  
   
                                                    2023   
09:                              Diastolic blood   
pressure                  60 mm[Hg]                 Juanito Esparza MD  
Work Phone:   
[phone]                          [facility]  
   
                                                    2023   
09:          Heart rate          77 /min             Juanito Esparza MD  
Work Phone:   
[phone]                          [facility]  
   
                                                    2023   
09:                              SaO2% (BldA) [Mass   
fraction]                 98 %                      Juanito Esparza MD  
Work Phone:   
[phone]                          [facility]  
   
                                                    2023   
09:                              Systolic blood   
pressure                  113 mm[Hg]                Juanito Esparza MD  
Work Phone:   
[phone]                          [facility]  
   
                                                    2023   
10:          Body weight         108.86 kg           Marin Fish MD  
Work Phone:   
[phone]                          [facility]  
   
                                                    2023   
10:                              Diastolic blood   
pressure                  58 mm[Hg]                 Marin Fish MD  
Work Phone:   
[phone]                          [facility]  
   
                                                    2023   
10:          Heart rate          70 /min             Marin Fish MD  
Work Phone:   
[phone]                          [facility]  
   
                                                    2023   
10:          Respiratory rate    18 /min             Marin Fish MD  
Work Phone:   
[phone]                          [facility]  
   
                                                    2023   
10:                              SaO2% (BldA) [Mass   
fraction]                 89 %                      Marin Fish MD  
Work Phone:   
[phone]                          [facility]  
   
                                                    2023   
10:                              Systolic blood   
pressure                  106 mm[Hg]                Marin Fish MD  
Work Phone:   
[phone]                          [facility]  
   
                                                    2023   
18:          Body weight         109.32 kg           Júnior Ahuja MD  
Work Phone:   
[phone]                          [facility]  
   
                                                    2023   
18:                              Diastolic blood   
pressure                  72 mm[Hg]                 Júnior Ahuja MD  
Work Phone:   
[phone]                          [facility]  
   
                                                    2023   
18:          Heart rate          72 /min             Júnior Ahuja MD  
Work Phone:   
[phone]                          [facility]  
   
                                                    2023   
18:                              SaO2% (BldA) [Mass   
fraction]                 100 %                     Júnior Ahuja MD  
Work Phone:   
[phone]                          [facility]  
   
                                                    2023   
18:                              Systolic blood   
pressure                  134 mm[Hg]                Júnior Ahuja MD  
Work Phone:   
[phone]                          [facility]  
   
                                                    03-   
14:          Body temperature    98.01 [degF]        Marin Rankin MD  
Work Phone:   
[phone]                          [facility]  
   
                                                    03-   
14:          Body weight         108.23 kg           Marin Rankin MD  
Work Phone:   
[phone]                          [facility]  
   
                                                    03-   
14:                              Diastolic blood   
pressure                  82 mm[Hg]                 Marin Rankin MD  
Work Phone:   
[phone]                          [facility]  
   
                                                    03-   
14:          Heart rate          92 /min             Marin Rankin MD  
Work Phone:   
[phone]                          [facility]  
   
                                                    03-   
14:          Respiratory rate    18 /min             Marin Rankin MD  
Work Phone:   
[phone]                          [facility]  
   
                                                    03-   
14:                              SaO2% (BldA) [Mass   
fraction]                 98 %                      Marin Rankin MD  
Work Phone:   
[phone]                          [facility]  
   
                                                    03-   
14:                              Systolic blood   
pressure                  132 mm[Hg]                Marin Rankin MD  
Work Phone:   
[phone]                          [facility]  
   
                                                    2023   
09:          Body temperature    97.3 [degF]         Tello Ortega DO  
Work Phone:   
[phone]                          [facility]  
   
                                                    2023   
09:          Body weight         108.18 kg           Tello Harti DO  
Work Phone:   
[phone]                          [facility]  
   
                                                    2023   
09:                              Diastolic blood   
pressure                  72 mm[Hg]                 Tello Tanneri DO  
Work Phone:   
[phone]                          [facility]  
   
                                                    2023   
09:          Heart rate          66 /min             Tello Harti DO  
Work Phone:   
[phone]                          [facility]  
   
                                                    2023   
09:                              SaO2% (BldA) [Mass   
fraction]                 99 %                      Tello Ortega DO  
Work Phone:   
[phone]                          [facility]  
   
                                                    2023   
09:                              Systolic blood   
pressure                  122 mm[Hg]                Tello Ortega DO  
Work Phone:   
[phone]                          [facility]  
   
                                                    02-   
15:          Body temperature    98.2 [degF]         [facility]  
Work Phone:   
[phone]                          [facility]  
   
                                                    02-   
15:                              Diastolic blood   
pressure                  92 mm[Hg]                 [facility]  
Work Phone:   
[phone]                          [facility]  
   
                                                    02-   
15:          Heart rate          75 /min             [facility]  
Work Phone:   
[phone]                          [facility]  
   
                                                    02-   
15:          Respiratory rate    16 /min             [facility]  
Work Phone:   
[phone]                          [facility]  
   
                                                    02-   
15:                              SaO2% (BldA) [Mass   
fraction]                 100 %                     [facility]  
Work Phone:   
[phone]                          [facility]  
   
                                                    02-   
15:                              Systolic blood   
pressure                  129 mm[Hg]                [facility]  
Work Phone:   
[phone]                          [facility]  
   
                                                    2023   
15:          Body temperature    96.91 [degF]        [facility]  
Work Phone:   
[phone]                          [facility]  
   
                                                    2023   
15:                              Diastolic blood   
pressure                  74 mm[Hg]                 [facility]  
Work Phone:   
[phone]                          [facility]  
   
                                                    2023   
15:          Heart rate          73 /min             [facility]  
Work Phone:   
[phone]                          [facility]  
   
                                                    2023   
15:          Respiratory rate    18 /min             [facility]  
Work Phone:   
[phone]                          [facility]  
   
                                                    2023   
15:                              SaO2% (BldA) [Mass   
fraction]                 100 %                     [facility]  
Work Phone:   
[phone]                          [facility]  
   
                                                    2023   
15:                              Systolic blood   
pressure                  131 mm[Hg]                [facility]  
Work Phone:   
[phone]                          [facility]  
   
                                                    02-   
14:          Body temperature    97.2 [degF]         [facility]  
Work Phone:   
[phone]                          [facility]  
   
                                                    02-   
14:                              Diastolic blood   
pressure                  74 mm[Hg]                 [facility]  
Work Phone:   
[phone]                          [facility]  
   
                                                    02-   
14:          Heart rate          91 /min             [facility]  
Work Phone:   
[phone]                          [facility]  
   
                                                    02-   
14:          Respiratory rate    16 /min             [facility]  
Work Phone:   
[phone]                          [facility]  
   
                                                    02-   
14:                              SaO2% (BldA) [Mass   
fraction]                 98 %                      [facility]  
Work Phone:   
[phone]                          [facility]  
   
                                                    02-   
14:                              Systolic blood   
pressure                  136 mm[Hg]                [facility]  
Work Phone:   
[phone]                          [facility]  
   
                                                    2023   
15:          Body temperature    97.59 [degF]        [facility]  
Work Phone:   
[phone]                          [facility]  
   
                                                    2023   
15:                              Diastolic blood   
pressure                  90 mm[Hg]                 [facility]  
Work Phone:   
[phone]                          [facility]  
   
                                                    2023   
15:          Heart rate          88 /min             [facility]  
Work Phone:   
[phone]                          [facility]  
   
                                                    2023   
15:          Respiratory rate    18 /min             [facility]  
Work Phone:   
[phone]                          [facility]  
   
                                                    2023   
15:                              SaO2% (BldA) [Mass   
fraction]                 99 %                      [facility]  
Work Phone:   
[phone]                          [facility]  
   
                                                    2023   
15:                              Systolic blood   
pressure                  151 mm[Hg]                [facility]  
Work Phone:   
[phone]                          [facility]  
   
                                                    2023   
15:          Body temperature    97.2 [degF]         [facility]  
Work Phone:   
[phone]                          [facility]  
   
                                                    2023   
15:                              Diastolic blood   
pressure                  72 mm[Hg]                 [facility]  
Work Phone:   
[phone]                          [facility]  
   
                                                    2023   
15:          Heart rate          87 /min             [facility]  
Work Phone:   
[phone]                          [facility]  
   
                                                    2023   
15:                              Systolic blood   
pressure                  144 mm[Hg]                [facility]  
Work Phone:   
[phone]                          [facility]  
   
                                                    2023   
15:          Body temperature    96.6 [degF]         [facility]  
Work Phone:   
[phone]                          [facility]  
   
                                                    2023   
15:                              Diastolic blood   
pressure                  77 mm[Hg]                 [facility]  
Work Phone:   
[phone]                          [facility]  
   
                                                    2023   
15:          Heart rate          86 /min             [facility]  
Work Phone:   
[phone]                          [facility]  
   
                                                    2023   
15:          Respiratory rate    16 /min             [facility]  
Work Phone:   
[phone]                          [facility]  
   
                                                    2023   
15:                              SaO2% (BldA) [Mass   
fraction]                 98 %                      [facility]  
Work Phone:   
[phone]                          [facility]  
   
                                                    2023   
15:                              Systolic blood   
pressure                  147 mm[Hg]                [facility]  
Work Phone:   
[phone]                          [facility]  
   
                                                    2023   
14:          Body temperature    97.11 [degF]        [facility]  
Work Phone:   
[phone]                          [facility]  
   
                                                    2023   
14:                              Diastolic blood   
pressure                  69 mm[Hg]                 [facility]  
Work Phone:   
[phone]                          [facility]  
   
                                                    2023   
14:          Heart rate          70 /min             [facility]  
Work Phone:   
[phone]                          [facility]  
   
                                                    2023   
14:                              SaO2% (BldA) [Mass   
fraction]                 96 %                      [facility]  
Work Phone:   
[phone]                          [facility]  
   
                                                    2023   
14:                              Systolic blood   
pressure                  126 mm[Hg]                [facility]  
Work Phone:   
[phone]                          [facility]  
   
                                                    2023   
14:          Body temperature    97.81 [degF]        [facility]  
Work Phone:   
[phone]                          [facility]  
   
                                                    2023   
14:                              Diastolic blood   
pressure                  68 mm[Hg]                 [facility]  
Work Phone:   
[phone]                          [facility]  
   
                                                    2023   
14:          Heart rate          61 /min             [facility]  
Work Phone:   
[phone]                          [facility]  
   
                                                    2023   
14:                              SaO2% (BldA) [Mass   
fraction]                 100 %                     [facility]  
Work Phone:   
[phone]                          [facility]  
   
                                                    2023   
14:                              Systolic blood   
pressure                  128 mm[Hg]                [facility]  
Work Phone:   
[phone]                          [facility]  
   
                                                    2023   
15:          Body temperature    97.2 [degF]         Júnior Ahuja MD  
Work Phone:   
[phone]                          [facility]  
   
                                                    2023   
15:          Body weight         111.58 kg           Júnior Ahuja MD  
Work Phone:   
[phone]                          [facility]  
   
                                                    2023   
15:                              Diastolic blood   
pressure                  64 mm[Hg]                 Júnior Ahuja MD  
Work Phone:   
[phone]                          [facility]  
   
                                                    2023   
15:          Heart rate          76 /min             Júnior Ahuja MD  
Work Phone:   
[phone]                          [facility]  
   
                                                    2023   
15:          Respiratory rate    16 /min             Júnior Ahuja MD  
Work Phone:   
[phone]                          [facility]  
   
                                                    2023   
15:                              Systolic blood   
pressure                  114 mm[Hg]                Júnior Ahuja MD  
Work Phone:   
[phone]                          [facility]  
   
                                                    2023   
13:          Body height         170.2 cm            Nathalia Bernal PA-C  
Work Phone:   
[phone]                          [facility]  
   
                                                    2023   
13:          Body temperature    98.91 [degF]        Nathalia Los Olivos PA-C  
Work Phone:   
[phone]                          [facility]  
   
                                                    2023   
13:          Body weight         114.31 kg           Nathalia Los Olivos PA-C  
Work Phone:   
[phone]                          [facility]  
   
                                                    2023   
13:                              Diastolic blood   
pressure                  80 mm[Hg]                 Nathalia Dolores PA-C  
Work Phone:   
[phone]                          [facility]  
   
                                                    2023   
13:          Heart rate          110 /min            Nathalia Los Olivos PA-C  
Work Phone:   
[phone]                          [facility]  
   
                                                    2023   
13:                              SaO2% (BldA) [Mass   
fraction]                 94 %                      Nathalia Dolores PA-C  
Work Phone:   
[phone]                          [facility]  
   
                                                    2023   
13:                              Systolic blood   
pressure                  140 mm[Hg]                Nathalia Los Olivos PA-C  
Work Phone:   
[phone]                          [facility]  
   
                                                    2022   
15:          Body temperature    98.01 [degF]        Tello Masci DO  
Work Phone:   
[phone]                          [facility]  
   
                                                    2022   
15:          Body weight         110.9 kg            Tello Masci DO  
Work Phone:   
[phone]                          [facility]  
   
                                                    2022   
15:                              Diastolic blood   
pressure                  81 mm[Hg]                 Tello Masci DO  
Work Phone:   
[phone]                          [facility]  
   
                                                    2022   
15:          Heart rate          93 /min             Tello Masci DO  
Work Phone:   
[phone]                          [facility]  
   
                                                    2022   
15:                              Systolic blood   
pressure                  134 mm[Hg]                Tello Masci DO  
Work Phone:   
[phone]                          [facility]  
   
                                                    10-   
13:          Body temperature    97.39 [degF]        Júnior Ahuja MD  
Work Phone:   
[phone]                          [facility]  
   
                                                    10-   
13:          Body weight         112.95 kg           Júnior Ahuja MD  
Work Phone:   
[phone]                          [facility]  
   
                                                    10-   
13:                              Diastolic blood   
pressure                  56 mm[Hg]                 Júnior Ahuja MD  
Work Phone:   
[phone]                          [facility]  
   
                                                    10-   
13:          Heart rate          64 /min             Júnior Ahuja MD  
Work Phone:   
[phone]                          [facility]  
   
                                                    10-   
13:          Respiratory rate    16 /min             Júnior Ahuja MD  
Work Phone:   
[phone]                          [facility]  
   
                                                    10-   
13:                              Systolic blood   
pressure                  108 mm[Hg]                Júnior Ahuja MD  
Work Phone:   
[phone]                          [facility]  
   
                                                    10-   
10:          Body temperature    97.81 [degF]        Brenda Veraman-Gold PT  
Work Phone:   
[phone]                          [facility]  
   
                                                    10-   
10:                              Diastolic blood   
pressure                  58 mm[Hg]                 Brenda Veraman-Gold PT  
Work Phone:   
[phone]                          [facility]  
   
                                                    10-   
10:          Heart rate          62 /min             Brenda   
Halderman-Gold PT  
Work Phone:   
[phone]                          [facility]  
   
                                                    10-   
10:          Respiratory rate    16 /min             Brenda   
Halderman-Gold PT  
Work Phone:   
[phone]                          [facility]  
   
                                                    10-   
10:                              SaO2% (BldA) [Mass   
fraction]                 99 %                      Brenda Jorgederman-Gold PT  
Work Phone:   
[phone]                          [facility]  
   
                                                    10-   
10:                              Systolic blood   
pressure                  112 mm[Hg]                Brenda   
Halderman-Gold PT  
Work Phone:   
[phone]                          [facility]  
   
                                                    10-   
09:          Body temperature    98.6 [degF]         Lalitha Pily PTA  
Work Phone:   
[phone]                          [facility]  
   
                                                    10-   
09:                              Diastolic blood   
pressure                  60 mm[Hg]                 Lalitha Pily PTA  
Work Phone:   
[phone]                          [facility]  
   
                                                    10-   
09:          Heart rate          68 /min             Lalitha Pily PTA  
Work Phone:   
[phone]                          [facility]  
   
                                                    10-   
09:          Respiratory rate    18 /min             Lalitha Pily PTA  
Work Phone:   
[phone]                          [facility]  
   
                                                    10-   
09:                              SaO2% (BldA) [Mass   
fraction]                 99 %                      Lalitha Pily PTA  
Work Phone:   
[phone]                          [facility]  
   
                                                    10-   
09:                              Systolic blood   
pressure                  112 mm[Hg]                Lalitha Pily PTA  
Work Phone:   
[phone]                          [facility]  
   
                                                    10-   
14:          Body temperature    98.01 [degF]        Lalitha Pily PTA  
Work Phone:   
[phone]                          [facility]  
   
                                                    10-   
14:                              Diastolic blood   
pressure                  60 mm[Hg]                 Lalitha Pily PTA  
Work Phone:   
[phone]                          [facility]  
   
                                                    10-   
14:          Heart rate          64 /min             Lalitha Pily PTA  
Work Phone:   
[phone]                          [facility]  
   
                                                    10-   
14:          Respiratory rate    18 /min             Lalitha Pily PTA  
Work Phone:   
[phone]                          [facility]  
   
                                                    10-   
14:                              SaO2% (BldA) [Mass   
fraction]                 98 %                      Lalitha Pily PTA  
Work Phone:   
[phone]                          [facility]  
   
                                                    10-   
14:                              Systolic blood   
pressure                  110 mm[Hg]                Lalitha Pily PTA  
Work Phone:   
[phone]                          [facility]  
   
                                                    2022   
11:          Body temperature    98.2 [degF]         Neida Gutierrez PT  
Work Phone:   
[phone]                          [facility]  
   
                                                    2022   
11:                              Diastolic blood   
pressure                  60 mm[Hg]                 Neida Gutierrez PT  
Work Phone:   
[phone]                          [facility]  
   
                                                    2022   
11:          Heart rate          68 /min             Neida Gutierrez PT  
Work Phone:   
[phone]                          [facility]  
   
                                                    2022   
11:          Respiratory rate    16 /min             Neida Gutierrez PT  
Work Phone:   
[phone]                          [facility]  
   
                                                    2022   
11:                              SaO2% (BldA) [Mass   
fraction]                 100 %                     Neida Corralox PT  
Work Phone:   
[phone]                          [facility]  
   
                                                    2022   
11:                              Systolic blood   
pressure                  120 mm[Hg]                Neida Gutierrez PT  
Work Phone:   
[phone]                          [facility]  
   
                                                    2022   
09:          Body temperature    97.39 [degF]        Lalitha Pily PTA  
Work Phone:   
[phone]                          [facility]  
   
                                                    2022   
09:                              Diastolic blood   
pressure                  64 mm[Hg]                 Lalitha Pily PTA  
Work Phone:   
[phone]                          [facility]  
   
                                                    2022   
09:          Heart rate          63 /min             Lalitha Pily PTA  
Work Phone:   
[phone]                          [facility]  
   
                                                    2022   
09:          Respiratory rate    18 /min             Lalitha Pily PTA  
Work Phone:   
[phone]                          [facility]  
   
                                                    2022   
09:                              SaO2% (BldA) [Mass   
fraction]                 97 %                      Lalitha Pily PTA  
Work Phone:   
[phone]                          [facility]  
   
                                                    2022   
09:                              Systolic blood   
pressure                  112 mm[Hg]                Lalitha Pily PTA  
Work Phone:   
[phone]                          [facility]  
   
                                                    2022   
10:                              Diastolic blood   
pressure                  60 mm[Hg]                 Brenda Hernandez PT  
Work Phone:   
[phone]                          [facility]  
   
                                                    2022   
10:          Heart rate          78 /min             Brenda Hernanedz PT  
Work Phone:   
[phone]                          [facility]  
   
                                                    2022   
10:          Respiratory rate    18 /min             Brenda Hernandez PT  
Work Phone:   
[phone]                          [facility]  
   
                                                    2022   
10:                              SaO2% (BldA) [Mass   
fraction]                 99 %                      Brenda Hernandez PT  
Work Phone:   
[phone]                          [facility]  
   
                                                    2022   
10:                              Systolic blood   
pressure                  108 mm[Hg]                Brenda Hernandez PT  
Work Phone:   
[phone]                          [facility]  
   
                                                    2022   
09:          Body temperature    97.9 [degF]         Brenda Hernandez PT  
Work Phone:   
[phone]                          [facility]  
   
                                                    2022   
14:          Body temperature    97.39 [degF]        Lalitha Pily PTA  
Work Phone:   
[phone]                          [facility]  
   
                                                    2022   
14:                              Diastolic blood   
pressure                  68 mm[Hg]                 Lalitha Pily PTA  
Work Phone:   
[phone]                          [facility]  
   
                                                    2022   
14:          Heart rate          76 /min             Lalitha Pily PTA  
Work Phone:   
[phone]                          [facility]  
   
                                                    2022   
14:          Respiratory rate    18 /min             Lalitha Pily PTA  
Work Phone:   
[phone]                          [facility]  
   
                                                    2022   
14:                              SaO2% (BldA) [Mass   
fraction]                 99 %                      Lalitha Pily PTA  
Work Phone:   
[phone]                          [facility]  
   
                                                    2022   
14:                              Systolic blood   
pressure                  118 mm[Hg]                Lalitha Pily PTA  
Work Phone:   
[phone]                          [facility]  
   
                                                    2022   
02:          Body temperature    97.3 [degF]         Brenda Hernandez PT  
Work Phone:   
[phone]                          [facility]  
   
                                                    2022   
02:                              Diastolic blood   
pressure                  50 mm[Hg]                 Brenda Hernandez PT  
Work Phone:   
[phone]                          [facility]  
   
                                                    2022   
02:          Heart rate          55 /min             Brenda Hernandez PT  
Work Phone:   
[phone]                          [facility]  
   
                                                    2022   
02:          Respiratory rate    16 /min             Brenda Hernandez PT  
Work Phone:   
[phone]                          [facility]  
   
                                                    2022   
02:                              SaO2% (BldA) [Mass   
fraction]                 99 %                      Brenda Hernandez PT  
Work Phone:   
[phone]                          [facility]  
   
                                                    2022   
02:                              Systolic blood   
pressure                  100 mm[Hg]                Brenda Hernandez PT  
Work Phone:   
[phone]                          [facility]  
   
                                                    2022   
14:          Body temperature    98.2 [degF]         Lalitha Pily PTA  
Work Phone:   
[phone]                          [facility]  
   
                                                    2022   
14:                              Diastolic blood   
pressure                  78 mm[Hg]                 Lalitha Pily PTA  
Work Phone:   
[phone]                          [facility]  
   
                                                    2022   
14:          Heart rate          78 /min             Lalitha Pily PTA  
Work Phone:   
[phone]                          [facility]  
   
                                                    2022   
14:          Respiratory rate    18 /min             Lalitha Pily PTA  
Work Phone:   
[phone]                          [facility]  
   
                                                    2022   
14:                              SaO2% (BldA) [Mass   
fraction]                 97 %                      Lalitha Pily PTA  
Work Phone:   
[phone]                          [facility]  
   
                                                    2022   
14:                              Systolic blood   
pressure                  134 mm[Hg]                Lalitha Pily PTA  
Work Phone:   
[phone]                          [facility]  
   
                                                    2022   
10:          Body temperature    97.81 [degF]        Lalitha Pily PTA  
Work Phone:   
[phone]                          [facility]  
   
                                                    2022   
10:                              Diastolic blood   
pressure                  70 mm[Hg]                 Lalitha Pily PTA  
Work Phone:   
[phone]                          [facility]  
   
                                                    2022   
10:          Heart rate          56 /min             Lalitha Pily PTA  
Work Phone:   
[phone]                          [facility]  
   
                                                    2022   
10:          Respiratory rate    18 /min             Lalitha Pily PTA  
Work Phone:   
[phone]                          [facility]  
   
                                                    2022   
10:                              SaO2% (BldA) [Mass   
fraction]                 98 %                      Lalitha Wolfion PTA  
Work Phone:   
[phone]                          [facility]  
   
                                                    2022   
10:                              Systolic blood   
pressure                  126 mm[Hg]                Lalitha Wolfion PTA  
Work Phone:   
[phone]                          [facility]  
   
                                                    2022   
10:          Body temperature    97.3 [degF]         Brenda   
Halderman-Gold PT  
Work Phone:   
[phone]                          [facility]  
   
                                                    2022   
10:                              Diastolic blood   
pressure                  60 mm[Hg]                 Brenda   
Halderman-Gold PT  
Work Phone:   
[phone]                          [facility]  
   
                                                    2022   
10:          Heart rate          68 /min             Brenda   
Halderman-Gold PT  
Work Phone:   
[phone]                          [facility]  
   
                                                    2022   
10:          Respiratory rate    18 /min             Brenda   
Halderman-Gold PT  
Work Phone:   
[phone]                          [facility]  
   
                                                    2022   
10:                              SaO2% (BldA) [Mass   
fraction]                 99 %                      Brenda   
Halderman-Gold PT  
Work Phone:   
[phone]                          [facility]  
   
                                                    2022   
10:                              Systolic blood   
pressure                  110 mm[Hg]                Brenda   
Halderman-Gold PT  
Work Phone:   
[phone]                          [facility]  
   
                                                    2022   
15:          Body temperature    97.9 [degF]         Brenda   
Halderman-Gold PT  
Work Phone:   
[phone]                          [facility]  
   
                                                    2022   
15:                              Diastolic blood   
pressure                  64 mm[Hg]                 Brenda   
Halderman-Gold PT  
Work Phone:   
[phone]                          [facility]  
   
                                                    2022   
15:          Heart rate          73 /min             Brenda   
Halderman-Gold PT  
Work Phone:   
[phone]                          [facility]  
   
                                                    2022   
15:          Respiratory rate    18 /min             Brendagomez Jorgederman-Gold PT  
Work Phone:   
[phone]                          [facility]  
   
                                                    2022   
15:                              SaO2% (BldA) [Mass   
fraction]                 99 %                      Brenda   
David PT  
Work Phone:   
[phone]                          [facility]  
   
                                                    2022   
15:                              Systolic blood   
pressure                  112 mm[Hg]                Brenda Hernandez PT  
Work Phone:   
[phone]                          [facility]  
   
                                                    2022   
09:          Body temperature    97.59 [degF]        Júnior Ahuja MD  
Work Phone:   
[phone]                          [facility]  
   
                                                    2022   
09:          Body weight         113.4 kg            Júnior Ahuja MD  
Work Phone:   
[phone]                          [facility]  
   
                                                    2022   
09:                              Diastolic blood   
pressure                  66 mm[Hg]                 Júnior Ahuja MD  
Work Phone:   
[phone]                          [facility]  
   
                                                    2022   
09:          Heart rate          60 /min             Júnior Ahuja MD  
Work Phone:   
[phone]                          [facility]  
   
                                                    2022   
09:          Respiratory rate    16 /min             Júnior Ahuja MD  
Work Phone:   
[phone]                          [facility]  
   
                                                    2022   
09:                              Systolic blood   
pressure                  130 mm[Hg]                Júnior Ahuja MD  
Work Phone:   
[phone]                          [facility]  
   
                                                    2022   
12:                              Diastolic blood   
pressure                  76 mm[Hg]                 Mi Nurse  
Work Phone:   
[phone]                          [facility]  
   
                                                    2022   
12:          Heart rate          62 /min             Mi Nurse  
Work Phone:   
[phone]                          [facility]  
   
                                                    2022   
12:                              Systolic blood   
pressure                  128 mm[Hg]                Mi Nurse  
Work Phone:   
[phone]                          [facility]  
  
  
  
Encounters  
  
  
                          Encounter Date Encounter Type Care Provider Facility  
   
                          Start: 2023 Telephone encounter Neelima Briones RN   
Gastroenterology  
  
  
  
                                          
   
                                          
   
                                          
   
                                          
   
                                          
   
                                          
   
                                          
   
                                          
   
                                          
   
                                          
   
                                          
   
                                          
   
                                          
   
                                          
   
                                          
   
                                          
   
                                          
   
                                          
   
                                          
   
                                          
   
                                          
   
                                          
   
                                          
   
                                          
   
                                          
   
                                          
   
                                          
   
                                          
   
                                          
   
                                          
   
                                          
   
                                          
   
                                          
   
                                          
   
                                          
   
                                          
   
                                          
   
                                          
   
                                          
   
                                          
   
                                          
   
                                          
   
                                          
   
                                          
   
                                          
   
                                          
   
                                          
   
                                          
   
                                          
   
                                          
   
                                          
   
                                          
   
                                          
   
                                          
   
                                          
   
                                          
   
                                          
   
                                          
   
                                          
   
                                          
   
                                          
   
                                          
   
                                          
   
                                          
   
                                          
   
                                          
   
                                          
   
                                          
   
                                          
   
                                          
   
                                          
   
                                          
   
                                          
   
                                          
   
                                          
   
                                          
   
                                          
   
                                          
   
                                          
   
                                          
   
                                          
   
                                          
   
                                          
   
                                          
   
                                          
   
                                          
   
                                          
   
                                          
   
                                          
   
                                          
   
                                          
   
                                          
   
                                          
   
                                          
   
                                          
   
                                          
   
                                          
   
                                          
   
                                          
   
                                          
   
                                          
   
                                          
   
                                          
   
                                          
   
                                          
   
                                          
   
                                          
   
                                          
   
                                          
   
                                          
   
                                          
   
                                          
   
                                          
   
                                          
   
                                          
   
                                          
   
                                          
   
                                          
   
                                          
   
                                          
   
                                          
   
                                          
   
                                          
   
                                          
   
                                          
   
                                          
   
                                          
   
                                          
   
                                          
   
                                          
   
                                          
   
                                          
   
                                          
   
                                          
   
                                          
   
                                          
   
                                          
   
                                          
   
                                          
   
                                          
   
                                          
   
                                          
   
                                          
   
                                          
   
                                          
   
                                          
   
                                          
   
                                          
   
                                          
   
                                          
   
                                          
   
                                          
   
                                          
   
                                          
   
                                          
   
                                          
   
                                          
   
                                          
   
                                          
   
                                          
   
                                          
   
                                          
   
                                          
   
                                          
   
                                          
   
                                          
   
                                          
   
                                          
   
                                          
   
                                          
   
                                          
   
                                          
   
                                          
   
                                          
   
                                          
   
                                          
   
                                          
   
                                          
   
                                          
   
                                          
   
                                          
   
                                          
   
                                          
   
                                          
   
                                          
   
                                          
   
                                          
   
                                          
   
                                          
   
                                          
   
                                          
   
                                          
   
                                          
   
                                          
   
                                          
   
                                          
   
                                          
   
                                          
   
                                          
   
                                          
   
                                          
   
                                          
   
                                          
   
                                          
   
                                          
   
                                          
   
                                          
   
                                          
   
                                          
   
                                          
   
                                          
   
                                          
   
                                          
   
                                          
   
                                          
   
                                          
   
                                          
   
                                          
   
                                          
   
                                          
   
                                          
   
                                          
  
  
  
Procedures  
  
  
                          Date         Procedure    Procedure Detail Performing   
Clinician  
   
                                        Start: 10-   Mri abdomen w/o &   
w/contrast material                                 Marin Fish MD  
Work Phone:   
[phone]  
   
                          Start: 2023 Colonoscopy               [facility]  
   
                                        Start: 2023   Mri abdomen w/o &   
w/contrast material                                 Maurizio Cochran PA-C  
Work Phone:   
[phone]  
   
                                        Start: 2022   Us abdominal real ti  
me   
w/image limited                                     Tello A Tanneri DO  
Work Phone:   
[phone]  
  
  
  
Plan of Treatment  
  
  
                          Date         Care Activity Detail       Author  
   
                          Start: 2027 Urine microalbumin profile            
    [facility]  
  
  
  
                                          
   
                                          
   
                                          
   
                                          
   
                                          
   
                                          
   
                                          
   
                                          
   
                                          
   
                                          
   
                                          
   
                                          
   
                                          
   
                                          
   
                                          
   
                                          
   
                                          
   
                                          
   
                                          
   
                                          
   
                                          
   
                                          
   
                                          
   
                                          
   
                                          
   
                                          
   
                                          
   
                                          
   
                                          
   
                                          
   
                                          
   
                                          
   
                                          
   
                                          
   
                                          
   
                                          
   
                                          
   
                                          
   
                                          
   
                                          
   
                                          
   
                                          
   
                                          
   
                                          
   
                                          
   
                                          
   
                                          
   
                                          
   
                                          
   
                                          
   
                                          
   
                                          
   
                                          
   
                                          
   
                                          
   
                                          
   
                                          
   
                                          
   
                                          
   
                                          
   
                                          
   
                                          
   
                                          
   
                                          
   
                                          
   
                                          
   
                                          
   
                                          
   
                                          
   
                                          
   
                                          
   
                                          
   
                                          
   
                                          
   
                                          
   
                                          
   
                                          
   
                                          
   
                                          
   
                                          
   
                                          
   
                                          
   
                                          
   
                                          
   
                                          
   
                                          
   
                                          
   
                                          
   
                                          
   
                                          
   
                                          
   
                                          
   
                                          
   
                                          
   
                                          
   
                                          
   
                                          
   
                                          
   
                                          
   
                                          
   
                                          
   
                                          
   
                                          
   
                                          
   
                                          
   
                                          
   
                                          
   
                                          
   
                                          
   
                                          
   
                                          
   
                                          
   
                                          
   
                                          
   
                                          
   
                                          
   
                                          
   
                                          
   
                                          
   
                                          
   
                                          
   
                                          
   
                                          
   
                                          
   
                                          
   
                                          
   
                                          
   
                                          
   
                                          
   
                                          
   
                                          
   
                                          
   
                                          
   
                                          
   
                                          
   
                                          
   
                                          
   
                                          
   
                                          
   
                                          
   
                                          
   
                                          
   
                                          
   
                                          
   
                                          
   
                                          
   
                                          
   
                                          
   
                                          
   
                                          
   
                                          
   
                                          
   
                                          
   
                                          
   
                                          
   
                                          
   
                                          
   
                                          
   
                                          
   
                                          
   
                                          
   
                                          
   
                                          
   
                                          
   
                                          
   
                                          
   
                                          
   
                                          
   
                                          
   
                                          
   
                                          
  
  
  
Immunizations  
  
  
                      Immunization Date Immunization Notes      Care Provider Fa  
cili  
   
                                        10-          influenza, high dose  
   
seasonal,   
preservative-free                                   Júnior Ahuja MD  
Work Phone:   
[phone]                          [facility]  
Work Phone:   
[phone]  
   
                                        10-          influenza virus vacc  
ine,   
unspecified formulation                             [facility]  
Work Phone:   
[phone]                          [facility]  
   
                                        2021          COVID-19 vaccine, ag  
e   
12+ yr (PFIZER-BIONTECH   
- PURPLE TOP)                                       [facility]  
Work Phone:   
[phone]                          [facility]  
Work Phone:   
[phone]  
   
                                        2021          influenza (aIIV4)   
vaccine, age 65+ yr,   
quadrivalent, PF (FLUAD   
QUADRIVALENT)                                       [facility]  
Work Phone:   
[phone]                          [facility]  
Work Phone:   
[phone]  
   
                                        2021          COVID-19 vaccine, ag  
e   
12+ yr (PFIZER-BIONTECH   
- PURPLE TOP)                                       [facility]  
Work Phone:   
[phone]                          [facility]  
Work Phone:   
[phone]  
   
                                        2021          COVID-19 vaccine, ag  
e   
12+ yr (PFIZER-BIONTECH   
- PURPLE TOP)                                       [facility]  
Work Phone:   
[phone]                          [facility]  
Work Phone:   
[phone]  
   
                                        2020          influenza, high dose  
   
seasonal,   
preservative-free                                   [facility]  
Work Phone:   
[phone]                          [facility]  
Work Phone:   
[phone]  
   
                                        2019          influenza, high dose  
   
seasonal,   
preservative-free                                   [facility]  
Work Phone:   
[phone]                          [facility]  
   
                                        10-          influenza, seasonal,  
   
injectable, preservative   
free                                                [facility]  
Work Phone:   
[phone]                          [facility]  
Work Phone:   
[phone]  
   
                                        2018          zoster vaccine   
recombinant                                         [facility]  
Work Phone:   
[phone]                          [facility]  
Work Phone:   
[phone]  
   
                                        2018          influenza, high dose  
   
seasonal,   
preservative-free                                   [facility]  
Work Phone:   
[phone]                          [facility]  
   
                                        2018          zoster vaccine   
recombinant                                         [facility]  
Work Phone:   
[phone]                          [facility]  
Work Phone:   
[phone]  
   
                                        2018          pneumococcal   
polysaccharide vaccine,   
23 valent                                           Júnior Ahuja MD  
Work Phone:   
[phone]                          [facility]  
Work Phone:   
[phone]  
   
                                        2017          influenza, high dose  
   
seasonal,   
preservative-free                                   [facility]  
Work Phone:   
[phone]                          [facility]  
   
                                        2017          pneumococcal conjuga  
te   
vaccine, 13 valhunter Ahuja MD  
Work Phone:   
[phone]                          [facility]  
Work Phone:   
[phone]  
   
                                        2017          tetanus and diphther  
ia   
toxoids, adsorbed,   
preservative free, for   
adult use (5 Lf of   
tetanus toxoid and 2 Lf   
of diphtheria toxoid)                               Mi Nurse  
Work Phone:   
[phone]                          [facility]  
   
                                        2017          tetanus toxoid, redu  
gloria   
diphtheria toxoid, and   
acellular pertussis   
vaccine, adsorbed                                   Mi Nurse  
Work Phone:   
[phone]                          [facility]  
Work Phone:   
[phone]  
   
                                        10-          influenza, high dose  
   
seasonal,   
preservative-free                                   Mi Nurse  
Work Phone:   
[phone]                          [facility]  
   
                                        10-          pneumococcal conjuga  
te   
vaccine, 13 valhunter Ahuja MD  
Work Phone:   
[phone]                          [facility]  
Work Phone:   
[phone]  
   
                                        2015          pneumococcal conjuga  
te   
vaccine, 13 valent                                  Mi Nurse  
Work Phone:   
[phone]                          [facility]  
   
                                        2014          influenza, seasonal,  
   
injectable                                          Mi Nurse  
Work Phone:   
[phone]                          [facility]  
   
                                        2013          influenza virus vacc  
ine,   
unspecified formulation                             Mi Nurse  
Work Phone:   
[phone]                          [facility]  
   
                                        10-          influenza virus vacc  
ine,   
unspecified formulation                             Mi Nurse  
Work Phone:   
[phone]                          [facility]  
Work Phone:   
[phone]  
   
                                        2009          influenza virus vacc  
ine,   
unspecified formulation                             Mi Nurse  
Work Phone:   
[phone]                          [facility]  
Work Phone:   
[phone]  
   
                                        2007          influenza virus vacc  
ine,   
unspecified formulation                             Mi Nurse  
Work Phone:   
[phone]                          [facility]  
   
                                        2007          tetanus and diphther  
ia   
toxoids, adsorbed,   
preservative free, for   
adult use (2 Lf of   
tetanus toxoid and 2 Lf   
of diphtheria toxoid)                               Mi Nurse  
Work Phone:   
[phone]                          [facility]  
   
                                        2006          influenza virus vacc  
ine,   
unspecified formulation                             Mi Nurse  
Work Phone:   
[phone]                          [facility]  
Work Phone:   
[phone]  
   
                                        2006          pneumococcal   
polysaccharide vaccine,   
23 valent                                           Mi Nurse  
Work Phone:   
[phone]                          [facility]  
Work Phone:   
[phone]  
   
                                        11-          influenza virus vacc  
ine,   
unspecified formulation                             Mi Nurse  
Work Phone:   
[phone]                          [facility]  
Work Phone:   
[phone]  
  
  
  
Payers  
  
  
                          Date         Payer Category Payer        Policy ID  
   
                                2019      Unknown         [employer]IC[employer]  
 SUPPLEMENT   
[id number]   
2019-Present   
[phone] [address]   
[place] [address]   
Indemnity                               [id number]   
[id number]  
[id number]  
   
                                2019      Unknown         [employer]  
 SUPPLEMENT   
[id number]   
2019-Present   
[phone] [address]   
[place] [address]   
Indemnity                               [id number]  
[id number]  
   
                          11-   Unknown                   [id number]  
   
                          2005   Medicare                    
   
                                2005      Medicare        MEDICARE MEDICAR  
[facility]   
[id number] 2005-Present   
[phone]  [address]    
[place] [address]   
Medicare                                [id number]   
[id number]  
[id number]  
   
                          2005   Medicare                  [id number]  
   
                                       Medicare                  [id number]  
  
  
  
Social History  
  
  
                          Date         Type         Detail       Facility  
   
                                                            Tobacco smoking stat  
[facility]                      Never smoked tobacco      [facility]  
Work Phone:   
[phone]  
   
                                                    Start: 2022  
End: 2023           Alcohol intake            Current non-drinker   
of alcohol (finding)                    [facility]  
   
                                                    Start: 2022  
End: 2023                         History SDOH Alcohol   
Frequency                 1                         [facility]  
   
                                                    Start: 2022  
End: 2023                         History SDOH Social   
Connections Phone         5                         [facility]  
   
                                                    Start: 2022  
End: 2023                         History SDOH Social   
Connections Get Together  4                         [facility]  
   
                                                    Start: 2022  
End: 2023                         History SDOH Social   
Connections Sabianism        3                         [facility]  
   
                          Start: 1940 Sex Assigned At Birth Not on file  C  
[facility]  
   
                                                    Start: 2022  
End: 2022                         Exposure to SARS-CoV-2   
(event)                   Not sure                  [facility]  
Work Phone:   
[phone]  
   
                                        Start: 2023   History SDOH Alcohol  
 Std   
Drinks                    0                         [facility]  
   
                                        Start: 2023   History SDOH Social   
Connections Get Together  2                         [facility]  
   
                                        Start: 2023   History SDOH Social   
Connections Membership    98                        [facility]  
   
                                                    Start: 2023  
End: 2023     History of Social function                     [facility]  
[facility]  
   
                                                    Start: 2023  
End: 2023                         Social connection and   
isolation panel                                     [facility]  
   
                                                            Do you belong to any  
 clubs   
or organizations such as   
Quaker groups, unions,   
fraternal or athletic   
groups, or school groups? Patient refused           [facility]  
   
                                                            Are you now ,  
   
, ,   
, never    
or living with a partner?                    [facility]  
   
                                                            How often to you hav  
e a   
drink containing alcohol? Never                     [facility]  
   
                                                            (I/We) worried wheth  
er   
(my/our) food would run   
out before (I/we) got   
money to buy more.        DK or Refused             [facility]  
   
                                                            In the past 12 month  
s, was   
there a time when you were   
not able to pay the   
mortgage or rent on time? No                        [facility]  
   
                                                            Do you belong to any  
 clubs   
or organizations such as   
Quaker groups, unions,   
fraternal or athletic   
groups, or school groups? Yes                       [facility]  
Work Phone:   
[phone]  
   
                                                            Are you now ,  
   
, ,   
, never    
or living with a partner?                    [facility]  
Work Phone:   
[phone]  
   
                                                            Do you feel stress -  
   
tense, restless, nervous,   
or anxious, or unable to   
sleep at night because   
your mind is troubled all   
the time - these days   
[OSQ]                     Not at all                [facility]  
Work Phone:   
[phone]  
  
  
  
Medical Equipment  
  
  
                                Procedure Code  Equipment Code  Equipment Origin  
al   
Text                      Equipment Identifier      Dates  
   
                                                                Felt Ptfe 1.2 Cm  
 X   
10 Cm - [id number]         [id number]                Start:   
10-  
  
  
  
                                          
   
                                          
   
                                          
  
  
  
Clinical Notes 2019 to 2023  
   Telephone Encounter - Neelima Briones RN - 2023 4:36 PM Juanito Bhagat MD - 10/19/2023 2:25 PM Margaret James RT(R) - 10/02/2023 10:00 AM 
Tello Beckwith DO - 2023 3:54 PM EDT  
  
                                Note Date & Type Note            Facility  
   
                                                    2023 Miscellaneous   
Notes                                   Formatting of this note might be   
different from the original.  
Attempted to reach the patient at   
the contact number that they   
provided [phone] (home) .   
Unable to speak with patient so   
without identifying the patient   
the following information was left   
on their voice mail: Date of   
procedure, location and report   
time  
Prep instructions  
A message was left informing the   
patient/patient representative   
they must have a responsible adult   
accompany them to their procedure;   
and remain in the endoscopy area   
until they are discharged. Failure   
to have a responsible adult   
accompany the patient to their   
procedure appointment prevents the   
use of sedation or anesthesia for   
their procedure; and can result in   
cancellation of the procedure  
NPO instructions were reviewed.  
Instructions to contact their   
primary care provider regarding   
their medications and which   
medications to stop in preparation   
for their procedure  
Instructions to completely read   
and follow the written   
instructions that they recieved   
regarding their procedure.  
Number to call with questions or   
concerns [phone]  
Number to call to cancel their   
procedure [phone]  
Neelima Briones RN  
  
Electronically signed by Neelima Briones RN at 2023 4:36 PM   
EST  
documented in this encounter            [facility]  
   
                                10- Note                 [facility]  
   
                                                    10- History of   
Present illness Narrative               Formatting of this note is   
different from the original.  
HCC Clinic New Patient  
Date of visit: 10/19/23  
  
CC: HCC treated with SBRT  
  
HPI: This is a 83 year old with   
medical history of MASH-related   
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   
complicated by HCC (within [place]   
treated with SBRT 2023), HE, PUD   
(bled ) here for follow-up  
  
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   
lesion, treated with SBRT   
(completed 2023)  
Most recent MRI 10/2 without new   
lesions and ecreased size of   
treated segment Keren lesion with   
enhancing internal septa (LR TR   
equivocal).  
  
He is doing well otherwise, no   
liver-related symptoms to report  
Has been having melenic stool for   
the last few weeks, also with some   
hematochezia. Has been requiring   
iron transfusions lately. Is more   
fatigued lately since this   
started. Patient does not want to   
pursue colonoscopy- had one in   
2023 with 1.4 cm TVA removed,   
EGD with small non-bleeding AVM   
which was cauterized  
  
Last clinic visit:  
Long-standing history of NAFLD  
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   
CÁRDENAS-related cirrhosis (given his   
risk factors) in 2022 on   
further workup of anemia. Saw   
hematology/oncology for this,   
eventually had liver imaging which   
noted a mass in 2022, recent   
MRI in April with cirrhosis along   
with a 4.1 cm HCC in seg 4A  
Case discussed at tumor board and   
he was referred for SBRT (limited   
options given age, functional   
limitations, etc)  
He met with radiation oncology   
just recently and is eager to get   
treatment started  
  
With respect to his liver-related   
symptoms:  
Sleeps a lot in the daytime, not   
so much at night. Possibly a bit   
more confusion when he talks.  
Has at least 4-5 soft bowel   
movements/day,  black as coal    
because he is taking iron   
supplements.  
No abdominal distention. Takes   
lasix for LE edema  
No jaundice, pruritis  
  
HCC history:  
Diagnosis date: 2022  
Initial AFP: 8.5  
Biopsy-proven?: no  
Imaging: MRI  
Within [place]: Yes  
CPT score: A  
ECOG performance status: 1  
  
Treatment history:  
LT candidate: no  
Resection: no  
Locoregional therapy: getting SBRT  
  
Review of Systems:  
See note  
  
Past Medical/surgical/family   
History: unchanged  
  
Social History:  
Tobacco: denies  
EtOH: denies  
Substances: denies  
  
I have confirmed and edited as   
necessary, the PFSH and ROS   
obtained by others.  
  
Current Outpatient Medications on   
File Prior to Visit  
Medication Sig  
oxybutynin ER (DITROPAN XL) 15 mg   
24 hr Extended Rel Tab Take 2   
tablets by mouth once daily.  
furosemide (LASIX) 20 mg tablet   
TAKE 1 TABLET BY MOUTH EVERY OTHER   
RDAY  
Diaper,Brief, Adult,Disposable   
(DEPEND EASY FIT UNDERGARMENTS) 1   
Each three times daily.  
ascorbic acid, vitamin C, (VITAMIN   
C) 500 mg tablet Take 1 tablet by   
mouth once daily.  
ascorbic acid-elderberry fruit   
100-50 mg chew Take 2 tablets by   
mouth once daily.  
cyanocobalamin (VITAMIN B-12)   
2,500 mcg tablet Take 2,500 mcg by   
mouth once daily.  
aspirin, enteric coated (ASPIRIN,   
ENTERIC COATED) 81 mg EC tablet   
Take 81 mg by mouth once daily.  
albuterol HFA (PROVENTIL HFA,   
VENTOLIN HFA) 90 mcg/actuation   
inhaler Inhale 2 Puffs as   
instructed every 4 hours as needed   
for wheezing/shortness of breath.  
omeprazole (PRILOSEC) 40 mg   
capsule Take 1 capsule by mouth   
once daily.  
allopurinol (ZYLOPRIM) 300 mg   
tablet Take 1 tablet by mouth once   
daily.  
magnesium oxide 400 mg magnesium   
cap Take 1 capsule by mouth once   
daily.  
fenofibrate nanocrystallized   
(TRICOR) 48 mg tablet Take 1   
tablet by mouth once daily.  
sertraline (ZOLOFT) 50 mg tablet   
Take 1 tablet by mouth once daily.  
nitroglycerin sublingual   
(NITROQUICK) 0.4 mg SL tablet   
Dissolve 1 tablet under the tongue   
every 5 minutes as needed for   
chest pain. FOR CHEST PAIN. IF NO   
RELIEF CALL 911  
fluticasone (FLONASE) 50   
mcg/actuation nasal spray Use 1   
Spray in each nostril once daily.  
ferrous sulfate 325 mg (65 mg   
iron) EC tablet Take 1 tablet by   
mouth twice daily with meals.   
(Patient taking differently: Take   
325 mg by mouth once daily.)  
fluticasone-vilanterol (BREO   
ELLIPTA) 200-25 mcg/dose inhaler   
Inhale 1 Inhalation as instructed   
once daily. Inhale one puff once   
daily. DO NOT CLICK OPEN UNTIL   
READY FOR DOSE  
multivitamin tablet Take 1 tablet   
by mouth once daily.  
torsemide (DEMADEX) 20 mg tablet   
Take 2 tablets by mouth once daily   
for 5 days.  
melatonin 10 mg tab Take 1 tablet   
by mouth as needed.  
amoxicillin (POLYMOX, AMOXIL) 500   
mg capsule Take four tablets, one   
hour prior to dental work (Patient   
not taking: Reported on 2023)  
  
No current facility-administered   
medications on file prior to   
visit.  
  
Vitals:  
/89[Pt denies headaches   
nausea and dizziness[   Pulse   
129[Afib[   Temp (Src) 97.8   
(Temporal)   Ht 5' 10  (1.78m)     
Wt 246 lb 9.6 oz (111.9kg)   SpO2   
98%   BMI 35.38 kg/(m^2).  
  
  
Physical Exam:  
Well appearing, in NAD  
No jaundice  
Aox3  
In a wheelchair  
  
Laboratory:  
MELD 3.0: 7 at 2023 10:07 AM  
MELD-Na: 8 at 2023 10:07 AM  
Calculated from:  
Serum Creatinine: 1.05 mg/dL at   
2023 10:07 AM  
Serum Sodium: 139 mmol/L (Using   
max of 137 mmol/L) at 2023   
10:07 AM  
Total Bilirubin: 0.5 mg/dL (Using   
min of 1 mg/dL) at 2023 10:07   
AM  
Serum Albumin: 3.5 g/dL at   
2023 10:07 AM  
INR(ratio): 1.1 at 2023 10:07   
AM  
Age at listing (hypothetical): 83   
years  
Sex: Male at 2023 10:07 AM  
  
Disclaimer: The MELD Calculator   
cannot calculate MELD scores for   
patients on dialysis.  
  
AFP  
Lab Results  
Component Value Date  
AFP <3.0 10/02/2023  
  
Imaging:  
MRI Liver 2022:  
Lesion#: 1  
Location: Segment Keren; (series 15   
image 19)  
Size (maximum long-axis   
dimension): 4.1 cm, Prior size:   
N/A  
Arterial Phase Hyperenhancement   
(APHE): Nonrim APHE  
Wash-out: Yes - non-peripheral  
Enhancing Capsule: Yes  
Threshold growth (50% or more   
growth in 6 months or less): N/A  
Ancillary features favoring   
malignancy or HCC: Fat in mass   
more than  
adjacent liver  
Ancillary features favoring   
benignity: N/A  
LI-RADS Category: LR-5 (definitely   
HCC)  
OPTN Class 5 Category:5B  
  
MRI Liver 10/2023  
  
Decreased size of treated segment   
Keren lesion with enhancing internal  
septa (LR TR equivocal).  
  
No new hepatic lesion that meets   
LR 5/OPTN criteria for   
hepatocellular  
carcinoma.  
  
Cirrhotic liver morphology with   
findings of portal hypertension,  
unchanged.  
  
Endoscopy:  
EGD 2023:  
Findings:  
A 5 cm hiatal hernia was present.  
A single 6 mm angiodysplastic   
lesion with no bleeding was found   
on  
the lesser curvature of the   
stomach. Fulguration to ablate the   
lesion  
by argon plasma at 1.3   
liters/minute and 35 flores was   
successful.  
Diffuse moderate inflammation   
characterized by erythema was   
found in  
the gastric body and in the   
gastric antrum. Biopsies were   
taken with  
a cold forceps for histology.  
Multiple 7 mm sessile polyps with   
no bleeding and no stigmata of  
recent bleeding were found in the   
stomach. This was biopsied with a  
cold forceps for histology.  
The first portion of the duodenum   
and second portion of the duodenum  
were normal.  
  
Assessment and plan:  
This is a 83 year old with medical   
history of MASH-related cirrhosis   
(MELD 3.0: 7) complicated by HCC   
(within [place] treated with SBRT   
2023), HE, PUD (bled ) here   
for follow-up  
Doing well from an HCC and   
liver-related standpoint without   
evidence of decompensation  
Has been having melenic stool   
lately. Discussed need for repeat   
EGD and colonoscopy but patient   
wants to defer repeat colonoscopy   
indefinitely  
Will send for EGD  
Repeat MRI 3 months  
  
RTC 3-4 months  
  
I spent a total of 45 minutes on   
the date of the service which   
included preparing to see the   
patient, face-to-face patient   
care, completing clinical   
documentation, obtaining and/or   
reviewing separately obtained   
history, performing a medically   
appropriate examination,   
counseling and educating the   
patient/family/caregiver, ordering   
medications, tests, or procedures,   
and communicating with other HCPs   
(not separately reported).  
  
Juanito Esparza MD  
Associate Staff, Department of   
Gastroenterology and Hepatology  
Dig[facility]ive Disease [facility] Surgery   
[place]  
Electronically signed by Juanito Esparza MD at 10/19/2023 3:43   
PM EDT  
documented in this encounter            [facility]  
   
                                10- Note                 [facility]  
   
                                10- Note                 [facility]  
   
                                                    10- History of   
Present illness Narrative               Formatting of this note is   
different from the original.  
Radiology Service Progress Note  
  
DATE OF SERVICE: 2023  
TIME: 10:47 AM  
  
PATIENT IDENTITY VERIFICATION   
COMPLETED USING TWO (2) STANDARD   
IDENTIFIERS: Name and Date of   
Birth confirmed by patient   
verbally.  
FALL SCREENING: Has the patient   
had 2 falls in the last year or 1   
fall with injury or currently   
using an Ambulatory Assistive   
Device (Walker, Cane, Wheelchair,   
Crutches, etc.)? Yes, Patient High   
Risk for Falls  
What interventions were put in   
place to prevent falls during this   
visit? Instructed Patient to Call   
for Help if Needed, Offered   
Assistance with   
Transfers/Clothing, Instructed   
Patient to Remain Seated (Not on   
Exam Table) Until Exam, and   
Increased Observations by   
Caregivers  
PATIENT GENDER DATA: Male  
PATIENT RELEVANT IMPLANT DATA   
REVIEWED: Yes  
ALLERGIES: Reviewed and unchanged  
CONTRAST ALLERGY: NO.  
  
EXAM: MRI - CONTRAST TYPE: GROUP   
II  
  
PERIPHERAL IV DATA: Ambulatory: A   
peripheral IV was started in the   
Right antecubital site with a   
Angio cath: 22 gauge.  
RADIOLOGY DEPARTMENT: MR; Exam(s)   
Completed: Body: Liver (routine)  
  
SIGNATURE: RT Larisa(R)   
PATIENT NAME: Mitesh Braun  
DATE: 2023 MRN:   
[id number]  
TIME: 10:47 AM  
  
Electronically signed by Margaret Reid RT(R) at 10/02/2023 10:47 AM   
EDT  
documented in this encounter            [facility]  
   
                                                    2023 Miscellaneous   
Notes                                   Formatting of this note might be   
different from the original.  
Referral form has been faxed to   
[facility] at number given below. Call to   
[place] on [employer] and left vague   
message that referral form has   
been faxed to [facility]. Also called   
work number listed and left   
message as well notifying her with   
vague message.  
  
Carlee Monge Ma  
  
Electronically signed by Carlee Monge Ma at 2023 9:38 AM   
EDT  
Formatting of this note might be   
different from the original.  
Please place referral, fax to   
[phone]. [facility] called and they   
have not received anything and pt   
is calling them. Also please call   
pt once referral has been faxed.   
Pt concerned because he needs to   
be seen asap.  
  
Chantel Aldana LPN  
  
Electronically signed by Chantel Aldana LPN at 2023 9:23   
AM EDT  
Formatting of this note might be   
different from the original.  
Please fax referral  
Krista Perez APRN.CNP  
Electronically signed by Krista Perez APRN.CNP at 2023 9:12   
AM EDT  
Formatting of this note might be   
different from the original.  
Patient calls to check on status   
of request below. Patient reports   
wound is on left calf. Open area   
is the size of a quarter with   
yellowish green drainage. Redness   
around the wound is also about the   
width of another quarter. Patient   
not sure if wound is warm to   
touch. Denies fever.  
Declines appointment for eval with   
[facility].  
Patient wants to go straight to   
wound center.  
  
Melva Khalil RN  
  
Electronically signed by   
Melva Khalil RN at   
2023 10:09 AM EDT  
Formatting of this note might be   
different from the original.  
Pt's daughter Dixie calling back   
to report wound is on pt's calf,   
she is not sure which calf, states   
it is about nickel to quarter   
size. Started Tues - 2 days ago.   
Has yellow drainage with redness   
around the opening. Denies fever.  
  
Please advise if referral will be   
made to the wound center.  
  
María Chance LPN  
  
Electronically signed by María Chance LPN at 2023 8:50 AM EDT  
Formatting of this note might be   
different from the original.  
Left message for JUMA Colin Nurse   
to call with more info.  
Helen Zollinger LPN  
  
Electronically signed by   
Zollinger, Helen E LPN at   
2023 4:19 PM EDT  
Formatting of this note might be   
different from the original.  
Please call Dixie from [facility] for more information on   
this wound: location, etc.  
Krista Perez APRN.CNP  
Electronically signed by Krista Perez APRN.CNP at 2023 2:16   
PM EDT  
Formatting of this note might be   
different from the original.  
Cece with [facility]   
calls to report that Dixie with   
[facility] called for appt   
for pt to be seen for yellow   
drainage from wound. Cece reports   
that pcp has to send order.  
  
Fax order to [facility]:   
[phone].  
  
Yi Last LPN  
  
Electronically signed by   
Yi Last LPN at   
2023 1:56 PM EDT  
documented in this encounter            [facility]  
   
                                                    2023 Miscellaneous   
Notes                                   Formatting of this note might be   
different from the original.  
Delfino spoke with daughter Dixie.   
Discussed patient currently   
receives 30 hour a week home care   
[facility]. Patient also   
receives medical alert button   
courtesy of the [facility].  
Dixie notes that she is concerned   
regarding patient falls. Patient   
is adamant that he is not   
interested in going in to an AL.   
Dixie and Delfino discussed that the [facility]   
nurse that visits patient noted   
patient was not to the point of   
needing a guardian.  
Dixie and Delfino also discussed   
caregiver support groups to help   
support her with taking care of   
patient.  
Dixie and Delfino also discussed [facility]   
[facility] being an agency if patient   
is ever open to going to AL   
looking at options.  
Discussed also that patient can   
make choice to stay at home as he   
so chooses. Dixie notes that she   
understands that, but it is   
difficult with being concerned   
about number of falls.  
Delfino also noted that there is APS   
through [facility] for older adults if   
and when they should proceed to   
inability to care for self and   
have cognitive decline issues. Delfino   
is aware that patient is not to   
that point. Just wanting to   
educate daughter on services   
available in the community.  
Dixie notes that she will reach   
out to [facility] and see   
if they have any other service and   
supports that they could provide   
patient. Patient already receives   
[facility].   
This would just be for any extra   
service ideas such as support   
daughter as caregiver.  
Dixie thanked Delfino for community   
social service information.  
Electronically signed by   
Janine Yanez MSW at 2023   
1:08 PM EDT  
Formatting of this note might be   
different from the original.  
Delfino received message back from   
patient daughter requesting call   
back. Delfino called daughter back and   
left message that she will reach   
out and call daughter Dixie today   
@1pm.  
Electronically signed by   
Janine Yanez MSW at 2023   
9:33 AM EDT  
Formatting of this note might be   
different from the original.  
Sw left message for daughter Dixie   
to return Sw call to discuss   
community resources for patient   
care needs.  
Electronically signed by   
Janine Yanez MSW at 2023   
9:28 AM EDT  
Formatting of this note might be   
different from the original.  
Refer to MSW for options for more   
home care or support.  
Electronically signed by   
Júnior Ahuja MD at   
2023 11:18 PM EDT  
Formatting of this note might be   
different from the original.  
Pt's daughter calls to report that   
pt is falling more and forgetful   
more. Daughter reports pt fell   
three times in eight days and   
needed help to get up. Daughter   
reports pt does not want daughter   
to come to OV anymore because   
 it's none of her business .   
Daughter is concerned pt is not   
being truthful to dr. Daughter has   
tried to discuss Assisted Living   
with pt but pt is adamant that he   
is not leaving his house. Daughter   
reports she does have a caretaker   
come to pt's home for five hours   
six days a week but it is getting   
to the point where someone needs   
to be there more.  
Daughter is asking if pcp has any   
recommendations on what to do.  
Daughter is aware that pcp is out   
of the office this week.  
  
Yi Last LPN  
  
Electronically signed by   
Yi Last LPN at   
2023 1:27 PM EDT  
documented in this encounter            [facility]  
   
                                                    2023 Miscellaneous   
Notes                                   Formatting of this note might be   
different from the original.  
Detailed VM left on pt's daughter   
identified voicemail of   
information below.  
  
Chantel Aldana LPN  
  
  
Electronically signed by Chantel Aldana LPN at 2023 8:49   
AM EDT  
Formatting of this note is   
different from the original.  
Patient's request for medication   
is as follows  
Requested Prescriptions  
  
Signed Prescriptions Disp Refills  
dilTIAZem CD (CARDIZEM CD, CARTIA   
XT) 120 mg 24 hr capsule 90   
capsule 1  
Sig: Take 1 capsule by mouth once   
daily.  
Authorizing Provider: JÚNIOR AHUJA  
metoprolol tartrate, short acting,   
(LOPRESSOR) 25 mg tablet 180   
tablet 1  
Sig: Take 1 tablet by mouth twice   
daily.  
Authorizing Provider: JÚNIOR AHUJA  
  
Order entered - please phone   
pharmacy and notify patient.  
  
Júnior Ahuja MD  
Electronically signed by   
Júnior Ahuja MD at   
2023 10:18 AM EDT  
Formatting of this note is   
different from the original.  
Patient has been identified by   
name and date of birth: Yes,   
Provider Date Time  
Daughter phones for refill(s):  
Requested Prescriptions  
  
Pending Prescriptions Disp Refills  
dilTIAZem CD (CARDIZEM CD, CARTIA   
XT) 120 mg 24 hr capsule  
Sig: Take 1 capsule by mouth once   
daily.  
metoprolol tartrate, short acting,   
(LOPRESSOR) 25 mg tablet  
Sig: Take 1 tablet by mouth twice   
daily.  
  
Patient's daughter calling for   
refills. Says patient was in ER   
yesterday for his heart rate. She   
states he stopped taking   
medication due to no refills.  
She does not know if these were   
prescribed by PCP or [facility].  
  
Date of last office visit with   
pcp: 23  
Date of last office visit in   
primary care:  
Last 2 Encounter Wt Readings:  
Date: Wt:  
2023 103.4 kg (228 lb)  
2023 103.9 kg (229 lb)  
Previous labs/tests for   
medication: Blood Pressure:  
BUN (mg/dL)  
Date Value  
2023 27  
2022 28  
  
Sodium (mmol/L)  
Date Value  
2023 139  
2022 139  
Last 1 Encounter BP Readings:  
Date: BP:  
2023 102/60  
Please advise. Thank you. Lolis Cervantes, RN 22  
Electronically signed by Lolis Cervantes RN at 2023 3:13 PM   
EDT  
documented in this encounter            [facility]  
   
                                2023 Note                 [facility]  
   
                                                    2023 History of   
Present illness Narrative               Formatting of this note is   
different from the original.  
Hematologic problem(s):  
1) IgG kappa monoclonal gammopathy  
2) BREEZY.  
3) Liver mass.  
4) Hypercalcemia.  
5) Splenomegaly.  
6) Cirrhosis.  
  
HPI: The patient is an 81 yo male   
with a PMH significant for HTN,   
polycystic kidney disease (seeing   
nephrology for ~15 years), HLD,   
aortic repair (bovine valve), CAD,   
CAREN (CPAP), asthma, hypercalcemia,   
splenomegaly (noted on CT chest   
2014; spleen up to 18.4 cm in AP   
dimension; not enlarged on CT   
enterography ) and obesity.  
  
He had lab work ordered at his   
nephrologist office. Protein   
electrophoresis of the urine   
demonstrated no evidence of   
monoclonal spike. Urine protein   
creatinine ratio was elevated.   
Chemistry panel showed a   
creatinine of 1.33 mg/dL. Calcium   
mildly elevated 10.6 mg/dL.   
Vitamin D39.7 PTH 93.4 CBC   
significant for a total white   
count of 4900. No differential   
performed. Hemoglobin 9.0 g/dL.   
. Platelet count 79,000.   
Protein electrophoresis of the   
serum revealed a M spike but no   
immunofixation was performed.  
  
Chronic sensory neuropathy of the   
feet and left hand x10 years.   
Worse over time. He's never been   
given an answer as to why.  
  
Balance is off and he uses wheeled   
walker.  
  
L[place] alone in [place]. Has   
home health aides for cleaning and   
washing clothes [facility].   
Daughter lives close by and he   
gets meals on wheels.  
  
Was having left posterior hip pain   
and got relief with Absorsene Jr.  
  
----------------------------------  
-  
  
He received 10 doses of iron   
sucrose.  
  
Presents for ongoing hematologic   
management.  
  
Interim history:  
He offers no complaints today.  
  
Diagnosed radiographic HCC per MRI   
obtained 2023 showing a 4.1   
cm tumor in segment 4A. Systemic   
staging, labs for classification   
(child Coffey, MELD) pending.   
Tentatively intermediate stage per   
BCLC, cT2 N0 MX stage II per AJCC.  
  
COURSE: definitive and SBRT   
(stereotactic body radiotherapy)  
AREA TREATED: Segment 4a liver   
HCC.  
Current dose: 3000 cGy in 1 fx  
Planned dose: 3000 cGy in 1 fx  
  
Had colonoscopy and EGD in 2023. A tubulovillous adenoma was   
removed from the sigmoid colon. On   
EGD biopsies of the stomach mucosa   
demonstrated gastric antral type   
mucosa with mild chronic inactive   
gastritis and reactive   
gastropathy. Fragments of gastric   
antral type mucosa with mild   
chronic inactive gastritis and   
reactive gastropathy. IHC for H.   
pylori was negative.  
  
Was found to be in atrial   
fibrillation when home nurse from   
[facility] picked up irregular heart beat   
on pulse ox check. No symptoms.   
Was taken to [facility] ED. he was   
advised to increase low-dose   
aspirin to 3 times daily--but I   
cannot find documentation of this   
after careful review of the   
[facility]   
electronic medical record. He is   
not on anticoagulation because of   
thrombocytopenia and cirrhosis.  
  
Back in ED yesterday at the   
request of the [facility] due to   
variable heart rate. Was told in   
ED  in and out  of a fib.  
  
Sensory neuropathy symptoms   
subjectively stable.  
Previously seen by neurology group   
in [place].  
Was told nothing to be done for   
neuropathy.  
  
He has not had any black stools.   
He says occasionally he has some   
bleeding from hemorrhoids.  
  
PHYSICAL EXAM:  
Vitals: Blood pressure 108/63,   
pulse 70, temperature 36.6 C (97.9   
F), height 171 cm (5' 7.32 ),   
weight 109.8 kg (242 lb), SpO2 100   
%.  
Fatigued-appearing and in no acute   
distress.  
EYES: Sclerae are anicteric   
bilaterally.  
LYMPHATIC: There is no palpable   
cervical or supraclavicular   
adenopathy.  
RESPIRATORY: Inspiratory breath   
sounds are of normal intensity in   
all fields.  
CARDIOVASCULAR: Rhythm is regular   
today.  
ABDOMEN: The abdomen is obese but   
nondistended.  
Extremities: Bilateral lower   
extremity swelling. Right somewhat   
worse than left. Overlying   
pitting. Stable.  
SKIN: No jaundice.  
  
LABS:  
Component Latest Ref Rng & Units   
2022  
WBC 3.70 - 11.00 k/uL 5.22 4.65   
3.87  
RBC 4.20 - 6.00 m/uL 3.27 (L) 3.73   
(L) 3.63 (L)  
Hemoglobin 13.0 - 17.0 g/dL 10.9   
(L) 11.1 (L) 11.8 (L)  
Hematocrit 39.0 - 51.0 % 34.6 (L)   
36.0 (L) 36.2 (L)  
MCV 80.0 - 100.0 fL 105.8 (H) 96.5   
99.7  
MCH 26.0 - 34.0 pg 33.3 29.8 32.5  
MCHC 30.5 - 36.0 g/dL 31.5 30.8   
32.6  
RDW-CV 11.5 - 15.0 % 15.0 16.9 (H)   
20.0 (H)  
Platelet Count 150 - 400 k/uL 69   
(L) 98 (L) 66 (L)  
MPV 9.0 - 12.7 fL 12.2 11.4 9.8  
Neut% % 78.9 79.1  
Abs Neut (ANC) 1.45 - 7.50 k/uL   
3.67 3.06  
Lymph% % 10.8 11.1  
Abs Lymph 1.00 - 4.00 k/uL 0.50   
(L) 0.43 (L)  
Mono% % 5.6 6.2  
Abs Mono <0.87 k/uL 0.26 0.24  
Eosin% % 3.4 2.8  
Abs Eosin <0.46 k/uL 0.16 0.11  
Baso% % 0.9 0.5  
Abs Baso <0.11 k/uL 0.04 <0.03  
Immature Gran % % 0.4 0.3  
IMMATURE GRANS (ABS) <0.10 k/uL   
<0.03 <0.03  
NRBC /100 WBC 0.0 0.0  
Absolute nRBC <0.01 k/uL <0.01   
<0.01 <0.01  
DTYPE Auto Auto  
Iron 41 - 186 ug/dL 30 (L) 46  
TIBC 232 - 386 ug/dL 370 360  
Transferrin Saturation 15.0 - 57.0   
% 8.1 (L) 12.8 (L)  
Ferritin 30.3 - 565.7 ng/mL 24.8   
(L) 50.4  
  
Component Latest Ref Rng & Units   
2021  
Protein, Total 6.3 - 8.0 g/dL 6.4   
5.8 (L) 6.6  
Albumin 3.9 - 4.9 g/dL 3.8 (L) 3.5   
(L) 3.8 (L)  
Calcium 8.5 - 10.2 mg/dL 10.6 (H)   
10.8 (H) 10.8 (H)  
Bilirubin, Total 0.2 - 1.3 mg/dL   
0.5 0.4 0.5  
Alkaline Phosphatase 38 - 113 U/L   
62 85 111  
AST 14 - 40 U/L 31 30 35  
Glucose 74 - 99 mg/dL 103 (H) 108   
(H) 98  
BUN 9 - 24 mg/dL 26 (H) 33 (H) 27   
(H)  
Creatinine 0.73 - 1.22 mg/dL 1.16   
1.31 (H) 1.01  
Sodium 136 - 144 mmol/L 141 140   
135 (L)  
Potassium 3.7 - 5.1 mmol/L 4.8 5.5   
(H) 4.0  
Chloride 97 - 105 mmol/L 109 (H)   
108 (H) 103  
CO2 22 - 30 mmol/L 23 25 25  
Anion Gap 9 - 18 mmol/L 9 7 (L) 7   
(L)  
ALT 10 - 54 U/L 19 21 19  
eGFR-African American >60  
eGFR-All Other Races . >60  
eGFR >=60 mL/min/1.73m 54 (L) 74  
  
Component Latest Ref Rng & Units   
2022  
Cold Agglut, 4 degrees C <1:32   
Dilutions <1:32  
Cold Agglut, 37 degrees C   
Dilutions Test not indicated when   
titer is <1:32 at 4 degrees C.  
  
Component Latest Ref Rng & Units   
2022  
PT Sec <13.1 sec 10.9  
PT INR 0.9 - 1.3 1.1  
Pathologist Interpretation, CBCDIF   
Normocytic anemia with slight   
polychromasia . . .  
Pathologist (PASHA) Reviewed by   
Savanah Cole M.D., Ph.D  
APTT 23.0 - 32.4 sec 26.2  
Fibrinogen Ag 149 - 353 mg/dL 353  
  
Component Latest Ref Rng & Units   
2022  
IgG 700 - 1,600 mg/dL 1,120  
IgA 70 - 400 mg/dL 346  
IgM 40 - 230 mg/dL 79  
  
Component Latest Ref Rng & Units   
2022  
Albumin 3.43 - 5.41 g/dL 3.62  
Alpha 1 Globulin 0.18 - 0.43 g/dL   
0.32  
Alpha 2 Globulin 0.42 - 0.98 g/dL   
0.66  
Beta Globulin 0.61 - 1.17 g/dL   
0.87  
Gamma Globulin 0.53 - 1.51 g/dL   
1.12  
Interpretation (Prot Electro) No   
definitive M protein is identified   
on protein electrophoresis. An M   
protein is identified on protein   
electrophoresis. (A)  
Interpretation Comment for Protein   
Electrophoresis See separate   
immunofixation report for   
characterization of monoclonal   
gammopathy. . . .  
M-Protein Location Gamma Fraction   
1  
M-Protein Concentration <=0.00   
g/dL 0.30 (H)  
SPE Staff Review Reviewed by Sanaz Parrish MD  
  
Component Latest Ref Rng & Units   
2022  
Kappa Free, Serum 3.3 - 19.4 mg/L   
65.0 (H)  
Lambda Free, Serum 5.7 - 26.3 mg/L   
37.1 (H)  
K/L Ratio, Serum 0.26 - 1.65 1.75   
(H)  
  
IgG kappa on immunofixation of   
serum.  
  
Component Latest Ref Rng & Units   
2023  
PTH, Intact 15 - 65 pg/mL 69 (H)  
  
PATHOLOGY:  
Bone marrow biopsy done at [facility] 2023:  
Hypercellular bone marrow with   
trilineage hematopoiesis.  
No evidence of lymphoproliferative   
disorder or plasma cell dyscrasia.  
  
Flow cytometry did not reveal   
evidence of lymphoproliferative   
disorder. There was no monoclonal   
plasma cell neoplasm.  
  
Plasma cells reported at 2% on   
aspirate.  
Iron was reported as rare   
stainable iron.  
  
Fluorescence in situ hybridization   
for BCR ABL was negative.  
  
46 XY [20].  
  
IMAGING:  
US 2022:  
IMPRESSION:  
1. There is a new hyperechoic   
nodule in the right lobe of the   
liver  
which could be further evaluated   
by MRI  
2. Nodular contour of the liver   
which may be due to cirrhosis  
3. Splenomegaly  
  
Bone survey 2022:  
IMPRESSION:  
SEVERAL SUBCENTIMETER LUCENT AREAS   
IN THE CALVARIUM.  
  
DIFFUSE OSTEOPENIA.  
  
DEGENERATIVE CHANGES AS DESCRIBED.   
POSTOPERATIVE CHANGES AS   
DESCRIBED.  
  
NO ADDITIONAL FOCAL LYTIC OR   
BLASTIC ABNORMALITY IS   
APPRECIATED.  
  
ASSESSMENT/PLAN:  
(D47.2) MGUS (monoclonal   
gammopathy of unknown   
significance) (primary encounter   
diagnosis)  
(D47.2) IgG monoclonal gammopathy  
Assessment:  
-The patient is an 83-year-old   
male with a past medical history   
as outlined above. He has no   
history of diabetes but has   
longstanding worsening sensory   
neuropathy of the feet and left   
hand. He has polycystic kidney   
disease as well as chronic mild   
hypercalcemia with elevation of   
PTH and stable serum creatinine   
over time. Referred for possible   
serum monoclonal antibody on   
electrophoresis of the serum.   
Urine negative for monoclonal   
protein on electrophoresis.  
-Low-level IgG kappa monoclonal   
gammopathy.  
-Previous hypercalcemia secondary   
to hyperparathyroidism.  
-Bone survey revealed no lytic   
lesions.  
-Previously reviewed the results   
of the bone marrow biopsy in   
detail with the patient and his   
daughter. No evidence of   
smoldering myeloma or multiple   
myeloma.  
Plan:  
-Reassess in 6 months.  
  
(D50.0) Iron deficiency anemia due   
to chronic blood loss  
(D69.6) Thrombocytopenia (HCC)  
Assessment:  
-Responded very well to a course   
of parenteral iron.  
-No overt signs of GI bleeding.  
-He was not put on anticoagulation   
for atrial fibrillation due to   
thrombocytopenia.  
-Iron saturation low again.   
Ferritin decreasing.  
Plan:  
-Check B12, serum folate and TSH.  
-5 doses of iron sucrose.  
-Reassess 4 to 6 weeks after   
completing iron.  
  
(E21.3) Hyperparathyroidism (HCC)  
Assessment:  
-Serum PTH level elevated.  
Plan:  
-Defer management to PCP.  
  
(K74.69) Other cirrhosis of liver   
(HCC)  
Assessment:  
-Established with hepatology.  
-Diagnosed with HCC--received SBRT  
Plan:  
-Follow up with hepatology.  
  
Portions of this documentation   
were copied and pasted from   
previous office visit notes in   
order to provide a cohesive   
continuity of the history. The   
note has been reviewed and edited   
and updated as necessary.  
  
I spent a total of 30 minutes on   
the date of the service which   
included preparing to see the   
patient, face-to-face patient   
care, completing clinical   
documentation, obtaining and/or   
reviewing separately obtained   
history, performing a medically   
appropriate examination,   
counseling and educating the   
patient/family/caregiver, ordering   
medications, tests, or procedures,   
communicating with other HCPs (not   
separately reported), and   
communicating results to the   
patient/family/caregiver.  
  
Tello Ortega DO  
Electronically signed by Tello Ortega DO at 2023 4:34 PM   
EDT  
documented in this encounter            [facility]  
   
                                                    08- Miscellaneous   
Notes                                   Formatting of this note might be   
different from the original.  
Noted.  
Electronically signed by   
Júnior Ahuja MD at   
08/15/2023 12:31 PM EDT  
Formatting of this note might be   
different from the original.  
Ngoc TOLBERT with [facility] calls to let   
provider know that patient HR was   
out side of their parameters   
today.  and then 44. At end   
of session it was 68.   
Asymptomatic.  
  
Dr. Dotson's office also notified.   
Patient missed his appointment   
with Dr. Dotson last week and will   
reschedule.  
  
Melva Khalil RN  
  
Electronically signed by   
Melva Khalil RN at   
2023 11:44 AM EDT  
documented in this encounter            [facility]  
   
                                2023 Note                 [facility]  
   
                                                    2023 Miscellaneous   
Notes                                   Formatting of this note might be   
different from the original.  
José Antonio PT calling from [facility] to   
report plan of care for patient   
and physical therapy will visit   
patient 2 times a week for 2 weeks   
and 1 time a week for 1 week.   
Physical therapy will work with   
patient on functional mobility. No   
call back needed.  
  
Angela Pham RN  
  
Electronically signed by Angela Pham RN at 2023 3:57   
PM EDT  
documented in this encounter            [facility]  
   
                                                    2023 Miscellaneous   
Notes                                   Formatting of this note might be   
different from the original.  
[facility] notified.  
Electronically signed by Julia Shaffer Ma at 2023 3:58 PM EDT  
Formatting of this note might be   
different from the original.  
PCP agrees and will follow  
ALEXIA Roy.CNP  
Electronically signed by Krista Perez APRN.CNP at 2023 3:55   
PM EDT  
Formatting of this note might be   
different from the original.  
Sanaz MELTON calling from [facility] to   
report plan of care for patient   
and SN will visit patient 1 times   
a week for five weeks. SN will   
work with patient on monitoring   
and education of new diagnosis of   
Afib.  
  
[facility] needs verbal order for POC.   
After receives verbal order 485   
form will be completed and faxed   
to provider at [phone].  
  
Please call verbal order to [facility]   
at [phone].  
  
Melva Khalil RN  
Electronically signed by   
Melva Khalil RN at   
2023 3:41 PM EDT  
documented in this encounter            [facility]  
   
                                                    2023 Miscellaneous   
Notes                                   Formatting of this note might be   
different from the original.  
Below response left on identified   
vm.  
Helen Zollinger LPN  
  
Electronically signed by   
Zollinger, Helen E LPN at   
2023 1:00 PM EDT  
Formatting of this note might be   
different from the original.  
I will follow HH.  
Electronically signed by   
Júnior Ahuja MD at   
2023 12:42 PM EDT  
Formatting of this note might be   
different from the original.  
Kimmy with [facility] calling. Patient   
discharging from [facility], diagnosis   
A-Fib, post-fall. Pt has orders   
for HH Nursing, PT and OT and   
asking if PCP willing to follow   
for these orders? They would like   
to start seeing patient tomorrow,   
on .  
  
Please call Kimmy back with   
approval at [phone].  
  
Lenore Lee RN  
  
Electronically signed by Lenore Lee RN at 2023 10:00 AM   
EDT  
documented in this encounter            [facility]  
   
                                                    2023 Miscellaneous   
Notes                                   Formatting of this note might be   
different from the original.  
Called and left a detailed   
voicemail notifying patient's   
daughter of providers message.   
Hospital phone number was left in   
case she had any questions.  
  
Nathalia Peterson RN  
  
Electronically signed by Nathalia Peterson RN at 2023 8:26 AM   
EDT  
Formatting of this note might be   
different from the original.  
Discuss at upcoming appointment   
later this month and for face to   
face requirement.  
Electronically signed by Júnior Ahuja MD at 2023 11:41   
PM EDT  
Formatting of this note might be   
different from the original.  
Dixie, pt's daughter called and   
would like to get an order for OT   
home health. Pt's mobility is   
decreasing. Daughter will check to   
see which Home Health Agency is   
covered by pt's insurance.. She   
will call back.  
  
Chantel Aldana LPN  
  
Electronically signed by Chantel Aldana LPN at 2023 4:35   
PM EDT  
documented in this encounter            [facility] [facility]  
   
                                                    2023 Miscellaneous   
Notes                                   Formatting of this note might be   
different from the original.  
Appeal letter for xifaxan faxed to   
[employer] appeal department for   
review.  
Electronically signed by Neno Bingham RN at 2023 1:03 PM   
EDT  
documented in this encounter            [facility]  
   
                                                    2023 Miscellaneous   
Notes                                   Formatting of this note might be   
different from the original.  
  
  
Spoke with patient: Yes  
Confirmed date scheduled and   
patient report time: Yes  
Procedure Planned:Colonoscopy with   
or without biopsies based on   
clinical findings  
Esophagogastroduodenoscopy(EGD)   
with or without biopies based on   
clinical findings, removal of   
polyps or lesions  
Is the patient on blood   
thinners?no  
Procedure Instructions given to   
patient: Yes, and they verbalized   
their understanding of   
instructions given  
Patient instructed to take   
prescribed preparation prior to   
procedure:Yes, and they verbalized   
their understanding of   
instructions given  
Patient instructed to have   
family/friend present for   
procedure transport   
home:Patient/patient   
representative was told that if   
they do not have a responsible   
adult accompany them to their   
procedure; and remain in the   
endoscopy area until they are   
discharged; that their procedure   
cannot be done with sedation or   
anesthesia and may be cancelled.   
and They verbalized their   
understanding and agree to have a   
responsible adult accompany the   
patient to their procedure and   
remain in the endoscopy area.  
Any barriers to Patient learning:   
Patient/Patient Representative   
responded appropriately on phone.  
Type of instruction given: Verbal   
by telephone contact.  
Trinidad Dumont LPN  
Electronically signed by Trinidad Dumont LPN at 2023 4:01 PM   
EDT  
documented in this encounter            [facility]  
   
                                2023 Note                 [facility]  
   
                                                    2023 History of   
Present illness Narrative               Formatting of this note might be   
different from the original.  
Q3 FRANSICO Triage Note  
  
Pt scheduled for upcoming   
endoscopic evaluation in Q3. Chart   
reviewed, no apparent   
contraindication at this time   
based on Q3 Indications for   
Anesthesia Consult and OR Cases.   
Final Anesthesia review and   
clearance will be performed on the   
day of the procedure, this note   
does note serve as procedural   
clearance.  
  
Seferino Barbosa PA-C  
  
Electronically signed by Seferino Barbosa PA-C at 2023 9:52   
AM EDT  
documented in this encounter            [facility]  
   
                                                    2023 Miscellaneous   
Notes                                   Formatting of this note is   
different from the original.  
ALEX Dixie/daughter, advised refill   
for Oxybutynin (Ditropan) was sent   
to [facility], 3/2/2023 for   
180 tablets, 3 refills, 1 year   
supply.  
  
Dixie did not request refill for   
Lisinopril, does not want Patient   
taking, BP is already low and   
drops it lower. Only needing   
refill of Nitro.  
  
Patient has been identified by   
name and date of birth: Yes  
Dixie/daughter phones for   
refill(s):  
Requested Prescriptions  
  
Pending Prescriptions Disp Refills  
nitroglycerin sublingual   
(NITROQUICK) 0.4 mg SL tablet 25   
tablet 1  
Sig: Dissolve 1 tablet under the   
tongue every 5 minutes as needed   
for chest pain. FOR CHEST PAIN. IF   
NO RELIEF CALL 911  
  
Date of last office visit in   
primary care: 3/20/2023  
6 month follow-up: 2023  
Last 2 Encounter Wt Readings:  
Date: Wt:  
2023 103.9 kg (229 lb)  
2023 107.3 kg (236 lb 9.6   
oz)  
Previous labs/tests for   
medication: Blood Pressure:  
BUN (mg/dL)  
Date Value  
2023 27  
2022 28  
  
Sodium (mmol/L)  
Date Value  
2023 139  
2022 139  
Last 1 Encounter BP Readings:  
Date: BP:  
2023 121/64  
Please advise. Thank you. Helen Zollinger LPN  
  
Electronically signed by Helen E Zollinger LPN at 2023 2:56   
PM EDT  
Formatting of this note is   
different from the original.  
Patient was seen 3/20/23 by Dr. Ahuja and the note on the   
encounter for patient other refill   
request states he was never seen   
in their office but he was. He was   
also asking for lisinopril in the   
last request but no one ever   
called the patient or responded.   
Patient has been identified by   
name and date of birth: Yes  
  
Requested Prescriptions  
  
Pending Prescriptions Disp Refills  
nitroglycerin sublingual   
(NITROQUICK) 0.4 mg SL tablet 25   
tablet 1  
Sig: Dissolve 1 tablet under the   
tongue every 5 minutes as needed   
for chest pain. FOR CHEST PAIN. IF   
NO RELIEF CALL 911  
oxybutynin ER (DITROPAN XL) 15 mg   
24 hr Extended Rel Tab 180 tablet   
3  
Sig: Take 2 tablets by mouth once   
daily.  
  
RX INSTRUCTIONS:  
Patient aware RX will be sent to   
pharmacy. No need to notify   
patient.  
  
Vane Méndez Pss  
  
Electronically signed by Vane Méndez Pss at 2023 2:42 PM   
EDT  
documented in this encounter            [facility]  
   
                                2023 Note                 [facility]  
   
                                2023 Note                 [facility]  
   
                                                    2023 History of   
Present illness Narrative               Formatting of this note might be   
different from the original.  
MITESH BRAUN  
[id number]  
2023  
  
[facility]  
Department of Radiation Oncology  
[facility]  
  
RADIATION ONCOLOGY: COMPLETION   
NOTE  
  
DATE OF SIMULATION: 2023  
  
DATES OF TREATMENT: 2023 to   
2023  
  
TREATMENT MACHINE: MASS-[employer]IVE [employer]  
  
TREATMENT AREA: Liver HCC  
  
DIAGNOSIS: 83 year old man with   
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  
  
CONCURRENT THERAPY: No.  
  
DELIVERED DOSE: The Liver HCC PTV   
received a total dose of 3000 cGy   
in 1 fractions at 3000   
cGy/fraction prescribed to Max   
Dose at 73.5% IDL using 10 MV   
photons with SBRT Coplanar VMAT   
technique. CBCT was used for IGRT.  
  
ELAPSED DAYS: 0  
  
TOLERANCE: At last on-treatment   
visit, his toxicity was summarized   
as excellent with no acute   
toxicities.  
  
RESPONSE: To be assessed in   
outpatient clinic.  
  
REMARKS: The patient will be seen   
again in follow up in 3-4 months   
with MRI liver + labs.  
  
Resident Physician  
Moises Kaplan M.D.  
  
Staff Physician  
Marin Fish M.D  
37:50 AM  
Electronically Signed  
  
cc:  
Júnior Ahuja  
[address] [place] [address]  
Phone: [phone]  
Fax: [phone]  
  
Dr. Ortega, CC  
  
  
Electronically signed by Marin Fish MD at 2023 7:50 AM   
EDT  
documented in this encounter            [facility]  
   
                                                    2023 Miscellaneous   
Notes                                   Formatting of this note might be   
different from the original.  
Pt never seen in our department  
Electronically signed by Bere Cali MA at 2023 10:54 AM   
EDT  
Formatting of this note is   
different from the original.  
Patient has been identified by   
name and date of birth: Yes  
  
Requested Prescriptions  
  
Pending Prescriptions Disp Refills  
lisinopril (ZESTRIL) 40 mg tablet   
30 tablet 11  
Sig: Take 1 tablet by mouth once   
daily.  
nitroglycerin sublingual   
(NITROQUICK) 0.4 mg SL tablet 25   
tablet 3  
Sig: Dissolve 1 tablet under the   
tongue as needed. FOR CHEST PAIN.   
IF NO RELIEF CALL 911  
oxybutynin ER (DITROPAN XL) 15 mg   
24 hr Extended Rel Tab 90 tablet 3  
Sig: Take 1 tablet by mouth once   
daily.  
  
RX INSTRUCTIONS:  
Patient aware RX will be sent to   
pharmacy. No need to notify   
patient.  
  
Miranda Bejarano  
Electronically signed by Miranda Bejarano at 2023 10:40 AM EDT  
documented in this encounter            [facility]  
   
                                2023 Note                 [facility]  
   
                                        2023 Nurse Note Formatting of this  
 note might be   
different from the original.  
Mitesh Braun is here for an   
appointment today with Nurse and   
experienced a fall during his   
clinical visit.  
Location of fall: [place]-, Exam   
rm 16  
  
Brief Factual Description: The   
patient experienced loss of   
balance while changing with the   
assistance from his daughter   
Dixie. Exam room door was closed.   
Daughter opened up the door to ask   
for help to get patient back into   
his w/c. Pt was found with both   
hands on the sink and kneeling on   
his left knee. Pt assisted back to   
his w/c. Pt denies hitting his   
head and denies any pain or   
discomfort. No injury to left   
knee. VS obtained and stable. Dr. Fish aware and into see   
patient and his daughter. Patient   
declined further evaluation and   
left unit by w/c with his daughter   
Dixie.  
Contributing Factors: lost balance   
and failure to call for assistance  
Did patient hit head or neck? No  
  
Is the patient having any pain? No  
  
Is patient alert? YES  
  
Injury: No Injury Noted  
  
INTERVENTIONS:  
  
Immediate Actions Taken: Provider   
notified  
Vitals completed  
  
Name of LIP Notified: Dr. Marin Fish  
Additional Prevention   
Measures:instructed Patient to   
remain seated, instructed pt. to   
call for help as needed, door to   
room left opened , offered   
assistance with   
transfers/clothing, and pt refused   
interventions/assistance.  
  
Electronically signed by Janet Arboleda LPN at 2023 3:44   
PM EDT  
documented in this encounter            [facility]  
   
                                                    2023 History of   
Present illness Narrative               Formatting of this note might be   
different from the original.  
  
Radiation Oncology Nursing Note  
  
PATIENT NAME: Mitesh Braun  
PATIENT MRN: [id number]  
  
[facility] FACILITY/LOCATION: Main   
[place]  
  
PROCEDURE: Contrast Injection for   
CT Simulation  
  
Safety Checks  
Patient identified using 2   
identifiers: Yes  
NPO x 4 hours verified: Yes  
Creatinine level drawn within the   
last 60 days: Yes  
Creatinine level within normal   
limits: Yes  
IV start: Time: 1355. #22g   
angiocath placed in the Left AC.   
Positive blood return verified:   
Yes  
Physician order for contrast   
injection verified: Yes  
Patient's allergies verified,   
including CT contrast: Yes  
Is the patient having any pain?   
None. 0 on a scale of 0 to 10.  
Concerns about physical or   
emotional abuse: No  
Fall risk: Yes, At risk due to:   
unsteady gait, weakness, and use   
of a wheelchair. LIP Notified.  
  
SIGNED by Janet Arboleda LPN  
  
Radiation Therapy - Patient   
Education Note  
  
PATIENT NAME: Mitesh Braun  
PATIENT MRN: [id number]  
  
May 5, 2023  
  
[facility] FACILITY/LOCATION: [facility]  
  
READINESS TO LEARN  
Cognitive Ability: Alert and   
oriented  
Motivation to learn: Eager  
Family Support: High - Very   
involved in pt care  
Instruction provide to: Patient   
and family member  
Patient learns best by: Individual   
Instruction  
Written Instruction - Hand-outs  
Verbal Instruction  
Factors effecting learning: None  
Physical limitations effecting   
learning: Limited Mobility  
  
LEARNING RESPONSE  
Diagnosis: Pt simulated today for   
radiation therapy to abdomen.  
Education Topic/Teaching Points:   
Radiation therapy, Side effects,   
and OTV:  
Method of instruction: Individual   
instruction  
Written instruction - handouts  
Verbal instruction  
Patient /Family response: Patient   
and family verbalized   
understanding of radiation   
treatments, side effects, OTV, and   
transportation.  
Follow-up plan: Patient instructed   
to call with any further issues  
Supplemental material:   
Informational handouts on SBRT   
Liver Handout.  
Referral (recommendation): None,   
Pt denied need for social work,   
van service, and dietician.  
  
Was  approved? Clinical   
questionnaires incomplete due to   
Patient declined to complete or   
answer questions with nurse  
  
Signed by: Janet Arboleda LPN  
Electronically signed by Janet Arboleda LPN at 2023 2:29   
PM EDT  
documented in this encounter            [facility]  
   
                                2023 Note                 [facility]  
   
                                2023 Note                 [facility]  
   
                                2023 Note                 [facility]  
   
                                                    2023 History of   
Present illness Narrative               Formatting of this note might be   
different from the original.  
MITESH BRAUN.  
[id number]  
2023  
  
[facility]  
Department of Radiation Oncology  
[facility]  
  
RADIATION ONCOLOGY - Therapist   
Injection Note  
  
  
Procedure: Contrast Injection for   
CT Simulation  
  
Safety Checks:  
Patient identified using two   
identifiers: Yes Physician order   
for contrast injection verified:   
Yes Patient's allergies verified,   
including CT contract: Yes eGFR   
verified: Yes  
Omnipaque:  300  mg/ml Volume:   
150cc  
Time contrast administered: 2:45   
pm  
  
Complications/Reaction: Yes No  
  
Patient Disposition:  
Patient IV access: Peripheral  
Time IV removed: 3:15 pm  
Patient/family provided with   
treatment instructions: Yes   
Patient/family verbally   
acknowledged understanding of   
treatment instructions: Yes  
Disposition: home  
  
Therapist: husam  
  
  
Electronically signed by Ccf   
Provider at 2023 3:19 PM EDT  
documented in this encounter            [facility]  
   
                                                    2023 History of   
Present illness Narrative               Formatting of this note might be   
different from the original.  
KADEMITESH  
[id number]  
  
2023  
  
[facility]  
Department of Radiation Oncology  
[facility]  
  
RADIATION ONCOLOGY SIMULATION NOTE  
  
DATE OF SIMULATION: 2023  
  
MACHINE: CT Simulator  
  
DIAGNOSIS: 83-year-old male with   
newly diagnosed radiographic HCC   
per MRI obtained 2023 showing   
a 4.1 cm tumor in segment 4A.   
Systemic staging, labs for   
classification (child Coffey, MELD)   
pending. Tentatively intermediate   
stage per BCLC, cT2 N0 MX stage II   
per AJCC.  
  
AREA:Segment 4a liver HCC.  
  
PATIENT POSITION: Supine.  
  
CONTRAST: 150cc IV omnipaque.  
  
PROTOCOL: None  
  
BLOCKS: Custom blocks are   
necessary to develop an optimal   
plan.  
  
FIXATION DEVICE: In order to   
achieve accurate and reproducible   
treatments, the patient is   
immobilized with a bodyfix device   
and ABC for respiratory motion   
management.  
  
PROCEDURE: A time-out was   
conducted and recorded by the   
therapist. Patient was simulated   
on the CT scanner for external   
beam radiation therapy. Use of the   
ABC device for respiratory motion   
management/ITV creation was   
reviewed at the time of simulation   
and found to be adequate.   
Treatment site was marked by the   
simulation therapist.  
  
ASSESSMENT/PLAN: Patient tolerated   
simulation procedure well.   
Treatments will be initiated after   
treatment planning. The patient   
will be scheduled for a   
verification simulation on the   
treatment machine to ensure proper   
set-up and field arrangement is   
correct prior to the first   
treatment of primary and any boost   
fields if applicable.  
  
Electronically Signed  
Marin Fish M.D.  
:47 PM  
  
  
Electronically signed by Marin Fish MD at 2023 4:47 PM   
EDT  
documented in this encounter            [facility]  
   
                                                    2023 Miscellaneous   
Notes                                   Formatting of this note is   
different from the original.  
RADIATION ONCOLOGY NURSING PRE-SIM   
CALL  
  
Today's date: May 4, 2023  
Time: 11:32 AM  
  
SIM date: 23  
  
Spoke with patient's daughter,   
Dixie. Patient instructed to be   
NPO after 10am, daughter   
verbalized understanding.  
  
SIM needs:  
Do you have a Mediport or PICC: No  
  
Anxiety / Claustrophobia History:   
No  
  
Pain Needs:  
Is that patient having pain that   
will impact SIM? No  
Will patient need pain medication   
prior to SIM? No  
  
Last Creatinine:  
Creatinine  
Date Value Ref Range Status  
2023 1.05 0.73 - 1.22 mg/dL   
Final  
]  
Last BUN:  
BUN  
Date Value Ref Range Status  
2023 27 (H) 9 - 24 mg/dL   
Final  
  
Last GFR: No results found for:   
EGFR  
IV Contrast: No  
Diabetic: No  
Pregnancy Status: Patient is male  
  
Maribell Hart RN  
Electronically signed by Maribell Hart RN at 2023 11:37   
AM EDT  
documented in this encounter            [facility]  
   
                                2023 Note                 [facility]  
   
                                2023 Note                 [facility]  
   
                                2023 Note                 [facility]  
   
                                2023 Note                 [facility]  
   
                                                    2023 History of   
Present illness Narrative               Formatting of this note might be   
different from the original.  
[facility] received prescription(s)   
for Xifaxan from Dr. Juanito Esparza's office. Benefits   
investigation was conducted,   
indicating that a prior   
authorization is required by   
patient's medicare part D   
insurance plan with [employer].  
  
Encounter will be updated once   
prior authorization has been   
submitted by [facility].  
  
Mari Giles [facility]  
[id number]  
[place] [address]  
[email or web address]  
[phone]  
[phone]  
  
Electronically signed by Mari Giles (Pharmacy Tech) at   
2023 11:50 AM EDT  
Formatting of this note might be   
different from the original.  
Xifaxan PA was initiated and   
pending review.  
  
Plan Name: [employer] med d  
Plan Agent/Key: [email or web address]   
(key [id number])  
Phone: [phone]  
Case: [id number]  
Timeline: 24-72 hrs  
  
Mari Giles (Pharmacy Tech)  
Electronically signed by Mari Giles (Pharmacy Tech) at   
2023 3:58 PM EDT  
documented in this encounter            [facility]  
   
                                2023 Note                 [facility]  
   
                                        2023 Instructions   
  
  
Juanito Esparza MD -   
2023 9:20 AM EDTFormatting   
of this note might be different   
from the original.  
As discussed in the office, you   
have a diagnosis of cirrhosis.  
The main complications of   
cirrhosis are:  
1. Ascites (fluid in the belly)  
2. Hepatic Encephalopathy   
(confusion)  
3. Varices (veins in the esophagus   
that can bleed)  
4. Hepatocellular Carcinoma (liver   
cancer)  
  
Should you develop any new   
symptoms or have worsening   
symptoms please contact our office   
immediately.  
  
You should go to the nearest   
emergency room and call our office   
immediately if any of the   
following occur:  
1. Temperature of 101 or above  
2. Confusion  
3. Vomiting blood  
4. Multiple black or red stools  
5. Shortness of breath  
6. Severe diarrhea  
7. Vomiting for more than twice in   
24 hours  
  
-Call my office to discuss any   
surgeries (elective or emergent)   
since cirrhotic patients are at an   
increased risk for surgery in   
terms of infection, bleeding, and   
even death.  
-Call my office if you are ever   
seen in the ER or admitted to the   
hospital.  
  
General Instructions:  
-Liver cancer prevention: You will   
need a picture of your liver with   
an ultrasound or MRI every 6   
months to screen for liver cancer.   
Each year you are at a small risk   
of liver cancer, and so this step   
is important to preventing the   
development of liver cancer and   
catching it early.  
  
-Diet/exercise:  
-It is very important to limit the   
amount of salt you eat on a daily   
basis. Salt (or  sodium  as it is   
labeled on most food labels) can   
lead to retention of fluid in the   
belly and legs. Limiting sodium to   
< 2000 mg (or 2 grams) is key to   
prevent this complication. Salt is   
hidden in nearly everything (even   
sweet foods!) so it is very   
important to read labels before   
cooking with or eating something.   
One of the best ways to know and   
limit how much salt you eat is to   
cook/prepare as much fresh food as   
you can.  
-In cirrhosis, your muscles tend   
to waste/atrophy, and so it is   
important to get a sufficient   
amount of protein in the diet.   
Plant-based protein and lean cuts   
of meat or fish are healthy. Try   
to avoid red meat if possible.  
-Avoid all avoid raw shellfish and   
undercooked meat- eating this can   
put you at risk of developing   
certain very serious infections.  
-Abstain from all alcohol intake.   
Even a small amount can have   
tremendous impact on your liver   
health.  
Try and participate in daily   
exercise or joyful movement every   
day.  
  
Medications:  
-You will likely be on many   
medications and it can sometimes   
be very difficult to remember when   
to take them. Something like a   
pill box or a phone alarm can be   
very helpful in reminding you when   
you should take your medications.  
-Take no more than 2 grams of   
tylenol in 24 hours (a sick liver   
cannot effectively process certain   
medications and too much tylenol   
can be dangerous to the liver and   
even lead to liver failure).  
-Because the liver cannot process   
certain medications as effectively   
when you have cirrhosis, avoid   
medications such as Benadryl,   
benzodiazepines (Ativan, valium,   
etc), and certain opiate pain   
medications if possible (tramadol,   
norco, oxycontin, dilaudid,   
morphine). These medications can   
lead to worsening confusion and   
sleepiness.  
-Avoid medications like Ibuprofen,   
Motrin, Aleve, Excedrin. These   
medications are non-steroidal   
anti-inflammatory drugs (NSAIDs)   
and can put lots of undue stress   
on the kidneys (which are   
particularly sensitive in patients   
with cirrhosis).  
-Call my office to discuss the   
risks and benefits of any new   
medications ( prescribed or over   
the counter) before taking the new   
medication.  
  
Other/general:  
-You should ask your primary care   
to ensure you have been vaccinated   
for pneumonia and the flu  
-If you are starting to struggle   
with confusion and memory loss, it   
is NOT safe to drive a vehicle or   
operate heavy machinery. Having   
cirrhosis with confusion can lead   
to impaired reflexes, and driving   
under these circumstances is like   
driving under the influence of   
drugs or alcohol. Please talk to   
your doctor (and our office) if   
you or a family member notices you   
are struggling with this.  
  
For more information about   
cirrhosis and how to navigate   
living with this disease, one   
excellent website is   
https://www.cirrhosiscare.ca/  
This provides lots of good   
resources for patients and their   
family members. Highly recommend   
this!  
Electronically signed by Juanito Esparza MD at 2023 9:20 AM   
EDT  
  
documented in this encounter            [facility]  
   
                                                    2023 History of   
Present illness Narrative               Formatting of this note is   
different from the original.  
HCC Clinic New Patient  
Date of visit:  
  
CC: new diagnosis of HCC  
  
HPI: This is a 83 year old with   
medical history of   
well-compensated CÁRDENAS-related   
cirrhosis (MELD-Na 8) complicated   
by HCC (within [place]), possible   
HE, PUD (bled ) here for   
follow-up  
Follows with Maurizio Cochran for CÁRDENAS  
  
Long-standing history of NAFLD  
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   
CÁRDENAS-related cirrhosis (given his   
risk factors) in 2022 on   
further workup of anemia. Saw   
hematology/oncology for this,   
eventually had liver imaging which   
noted a mass in 2022, recent   
MRI in April with cirrhosis along   
with a 4.1 cm HCC in seg 4A  
Case discussed at tumor board and   
he was referred for SBRT (limited   
options given age, functional   
limitations, etc)  
He met with radiation oncology   
just recently and is eager to get   
treatment started  
  
With respect to his liver-related   
symptoms:  
Sleeps a lot in the daytime, not   
so much at night. Possibly a bit   
more confusion when he talks.  
Has at least 4-5 soft bowel   
movements/day,  black as coal    
because he is taking iron   
supplements.  
No abdominal distention. Takes   
lasix for LE edema  
No jaundice, pruritis  
  
HCC history:  
Diagnosis date: 2022  
Initial AFP: 8.5  
Biopsy-proven?: no  
Imaging: MRI  
Within [place]: Yes  
CPT score: A  
ECOG performance status: 1  
  
Treatment history:  
LT candidate: no  
Resection: no  
Locoregional therapy: getting SBRT  
  
Review of Systems:  
See note  
  
Past Medical History:  
PAST MEDICAL HISTORY  
Diagnosis Date  
Adenomatous colon polyp 8/3/2011  
Adjustment disorder with depressed   
mood 3/9/2012  
Aortic valve disorders 2007  
stenosis  
Asthma exacerbation 11/10/2012  
Calculus of ureter 2005  
Esophageal reflux 10/14/2011  
Essential hypertension 10/14/2005  
Impaired renal function 4/15/2010  
Impotence of organic origin   
2006  
Intestinal polyp 8/3/2011  
Nonspecific abnormal results of   
liver function study 2006  
Obesity, unspecified  
CAREN on CPAP 10/13/2005  
Other abnormal blood chemistry  
Hyperuricemia  
Other and unspecified   
hyperlipidemia 10/14/2005  
Peripheral polyneuropathy 2/3/2015  
Polycystic kidney 2010  
Snoring  
Splenomegaly 9/10/2014  
Thrombocytopenia (HCC) 10/5/2011  
  
Family History:  
FAMILY HISTORY  
Problem Relation Age of Onset  
Heart Mother  
 in 70's  
Coronary Artery Disease Father  
 in his 80's  
Emphysema Father  
Diabetes Brother  
  
Social History:  
Tobacco: denies  
EtOH: denies  
Substances: denies  
  
I have confirmed and edited as   
necessary, the PFSH and ROS   
obtained by others.  
  
Current Outpatient Medications on   
File Prior to Visit  
Medication Sig  
oxybutynin ER (DITROPAN XL) 15 mg   
24 hr Extended Rel Tab Take 2   
tablets by mouth once daily.  
furosemide (LASIX) 20 mg tablet   
TAKE 1 TABLET BY MOUTH EVERY OTHER   
RDAY  
Diaper,Brief, Adult,Disposable   
(DEPEND EASY FIT UNDERGARMENTS) 1   
Each three times daily.  
ascorbic acid, vitamin C, (VITAMIN   
C) 500 mg tablet Take 1 tablet by   
mouth once daily.  
ascorbic acid-elderberry fruit   
100-50 mg chew Take 2 tablets by   
mouth once daily.  
cyanocobalamin (VITAMIN B-12)   
2,500 mcg tablet Take 2,500 mcg by   
mouth once daily.  
aspirin, enteric coated (ASPIRIN,   
ENTERIC COATED) 81 mg EC tablet   
Take 81 mg by mouth once daily.  
albuterol HFA (PROVENTIL HFA,   
VENTOLIN HFA) 90 mcg/actuation   
inhaler Inhale 2 Puffs as   
instructed every 4 hours as needed   
for wheezing/shortness of breath.  
omeprazole (PRILOSEC) 40 mg   
capsule Take 1 capsule by mouth   
once daily.  
allopurinol (ZYLOPRIM) 300 mg   
tablet Take 1 tablet by mouth once   
daily.  
magnesium oxide 400 mg magnesium   
cap Take 1 capsule by mouth once   
daily.  
fenofibrate nanocrystallized   
(TRICOR) 48 mg tablet Take 1   
tablet by mouth once daily.  
sertraline (ZOLOFT) 50 mg tablet   
Take 1 tablet by mouth once daily.  
nitroglycerin sublingual   
(NITROQUICK) 0.4 mg SL tablet   
Dissolve 1 tablet under the tongue   
every 5 minutes as needed for   
chest pain. FOR CHEST PAIN. IF NO   
RELIEF CALL 911  
fluticasone (FLONASE) 50   
mcg/actuation nasal spray Use 1   
Spray in each nostril once daily.  
ferrous sulfate 325 mg (65 mg   
iron) EC tablet Take 1 tablet by   
mouth twice daily with meals.   
(Patient taking differently: Take   
325 mg by mouth once daily.)  
fluticasone-vilanterol (BREO   
ELLIPTA) 200-25 mcg/dose inhaler   
Inhale 1 Inhalation as instructed   
once daily. Inhale one puff once   
daily. DO NOT CLICK OPEN UNTIL   
READY FOR DOSE  
multivitamin tablet Take 1 tablet   
by mouth once daily.  
torsemide (DEMADEX) 20 mg tablet   
Take 2 tablets by mouth once daily   
for 5 days.  
melatonin 10 mg tab Take 1 tablet   
by mouth as needed.  
amoxicillin (POLYMOX, AMOXIL) 500   
mg capsule Take four tablets, one   
hour prior to dental work (Patient   
not taking: Reported on 2023)  
  
No current facility-administered   
medications on file prior to   
visit.  
  
Vitals:  
Ht 5' 10  (1.78m)   Wt 246 lb 12.8   
oz (111.9kg)   BMI 35.41 kg/(m^2).  
  
  
Physical Exam:  
Well appearing, in NAD  
No jaundice  
Aox3  
In a wheelchair  
  
Laboratory:  
MELD-Na score: 8 at 2023 2:27   
PM  
MELD score: 8 at 2023 2:27 PM  
Calculated from:  
Serum Creatinine: 1.09 mg/dL at   
2023 2:27 PM  
Serum Sodium: 140 mmol/L (Using   
max of 137 mmol/L) at 2023   
2:27 PM  
Total Bilirubin: 0.5 mg/dL (Using   
min of 1 mg/dL) at 2023 2:27   
PM  
INR(ratio): 1.1 at 2023 2:27   
PM  
Age: 83 years  
  
Disclaimer: The MELD Calculator   
cannot calculate MELD scores for   
patients on dialysis.  
  
AFP  
Lab Results  
Component Value Date  
AFP 8.5 2023  
  
Imaging:  
MRI Liver 2022:  
Lesion#: 1  
Location: Segment Keren; (series 15   
image 19)  
Size (maximum long-axis   
dimension): 4.1 cm, Prior size:   
N/A  
Arterial Phase Hyperenhancement   
(APHE): Nonrim APHE  
Wash-out: Yes - non-peripheral  
Enhancing Capsule: Yes  
Threshold growth (50% or more   
growth in 6 months or less): N/A  
Ancillary features favoring   
malignancy or HCC: Fat in mass   
more than  
adjacent liver  
Ancillary features favoring   
benignity: N/A  
LI-RADS Category: LR-5 (definitely   
HCC)  
OPTN Class 5 Category:5B  
  
Hepatic vasculature and   
collaterals: ...  
  
Portal venous system (splenic   
vein, main portal vein, left and   
right  
anterior and right posterior   
portal vein branches): Patent.  
Portal vein diameter: 1.6 cm.  
Celiac trunk and SMA: Patent. No   
stenosis.  
Hepatic artery: Patent. Replaced   
right hepatic artery from SMA.  
Hepatic veins: Patent.  
Collaterals:  
Spontaneous splenorenal shunt:   
Absent  
Recanalized paraumbilical vein:   
Small  
Esophageal varices: Absent.  
Mesenteric portosystemic   
collaterals: Absent  
  
Related extrahepatic findings:  
  
Spleen: 13.7 cm (craniocaudally),   
enlarged. No mass.  
  
Mesentery/Peritoneum: Trace   
ascites. No mass.  
  
Other findings:  
  
Biliary: No bile duct dilation.   
Gallbladder present with   
cholelithiasis.  
  
Pancreas: No mass or duct   
dilation.  
  
Adrenals: No mass.  
  
Kidneys: Multiple bilateral cysts.   
No solid mass. No hydronephrosis.  
  
GI: No dilated bowel or wall   
thickening along imaged segments.   
Small  
hiatal hernia  
  
Lymph nodes: No abdominal   
lymphadenopathy.  
  
Vasculature: No abdominal aortic   
aneurysm.  
  
Bones/Soft Tissues: Degenerative   
change. Focal enhancement in the   
right  
L1 transverse process, likely   
posttraumatic. No other osseous   
lesion.  
  
Lower chest: Left lower lobe   
mucoid impaction. Bronchial wall   
thickening.  
  
Endoscopy:  
EGD :  
Findings:  
The examined jejunum was normal.  
The examined duodenum was normal.  
Localized moderate inflammation   
characterized by erosions,   
erythema  
and friability was found in the   
gastric antrum. Biopsies were   
taken  
with a cold forceps for histology.  
A medium-sized hiatal hernia was   
present.  
Non-severe esophagitis with no   
bleeding was found.  
  
Assessment and plan:  
This is a 83 year old with medical   
history of well-compensated   
CÁRDENAS-related cirrhosis (MELD-Na 8)   
complicated by HCC (within [place]),   
possible HE, PUD (bled ) here   
for follow-up, doing well  
We discussed the natural   
progression of his liver disease   
and signs/symptoms to look out for   
which would suggest progression of   
disease  
He is well-compensated at the   
moment which bodes well for   
treatment options in the future if   
he were to progress from a cancer   
standpoint  
  
Cirrhosis:  
HE: sounds as if he has HE but has   
loose stool throughout the day.   
Will check zinc, start rifaximin  
EV: EGD ordered, needs to schedule  
Ascites/SBP: none on imaging  
HCC: Planning on SBRT for his HCC   
which is appropriate given his age   
and comorbidities. Needs CT chest   
so will make sure that is   
scheduled  
  
RTC 3 months  
  
I spent a total of 45 minutes on   
the date of the service which   
included preparing to see the   
patient, face-to-face patient   
care, completing clinical   
documentation, obtaining and/or   
reviewing separately obtained   
history, performing a medically   
appropriate examination,   
counseling and educating the   
patient/family/caregiver, ordering   
medications, tests, or procedures,   
and communicating with other HCPs   
(not separately reported).  
  
Juanito Esparza MD  
Associate Staff, Department of   
Gastroenterology and Hepatology  
[facility]   
[place]  
  
--  
ASSESSMENT/PLAN:  
1. 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   
ascites, unspecified hepatic   
cirrhosis type (HCC) - ICD9:   
571.5, ICD10: K74.60  
- ZINC BLD  
- HEPATIC FUNCTION PNL  
- PROTHROMBIN TIME/PT  
- CBC + DIFF  
- BASIC METABOLIC PNL  
  
Juanito Esparza MD  
Electronically signed by Juanito Esparza MD at 2023 10:27   
AM EDT  
documented in this encounter            [facility]  
   
                                2023 Note                 [facility]  
   
                                                    2023 History of   
Present illness Narrative               Formatting of this note is   
different from the original.  
Images from the original note were   
not included.  
Radiation Oncology - New   
Patient/Consult Note  
  
PATIENT NAME: Mitesh Braun  
PATIENT MRN: [id number]  
  
REQUESTING PROVIDER: Tello Ortega DO  
  
DIAGNOSIS: 83-year-old male with   
newly diagnosed radiographic HCC   
per MRI obtained 2023 showing   
a 4.1 cm tumor in segment 4A.   
Systemic staging, labs for   
classification (child Coffey, MELD)   
pending. Tentatively intermediate   
stage per BCLC, cT2 N0 MX stage II   
per AJCC. Discussed at tumor board   
and referred for consideration of   
SBRT.  
  
HPI: 83 year old male who presents   
with above diagnosis, for an   
opinion regarding the role of   
radiation therapy in the   
management of the patient's   
disease. Final recommendations   
will be communicated back to the   
requesting physician by way of the   
shared medical record, or letter   
to requesting physician via US   
mail.  
  
83-year-old gentleman with a past   
medical history of polycystic   
kidney disease, coronary artery   
disease, obstructive sleep apnea,   
asthma, and chronic anemia who was   
recently diagnosed with cirrhosis   
complicated by hepatocellular   
carcinoma as outlined above. In   
review, he was first seen by   
hematology/oncology on 2022   
for evaluation of persistent   
anemia with concern for a   
presentation possibly consistent   
with multiple myeloma or MGUS.   
Given his history of splenomegaly,   
an ultrasound of the liver and   
spleen was ordered. This was   
obtained on 2022 and showed   
splenomegaly, cirrhosis (not   
previously described), and a  new   
hyperechoic nodule in the right   
lobe of the liver which could be   
further evaluated by MRI . In   
2023 he underwent bone   
marrow biopsy which did not reveal   
any evidence of  smoldering   
myeloma or multiple myeloma . He   
does have a low level IgG kappa   
monoclonal gammopathy consistent   
with monoclonal gammopathy of   
unknown significance. He was also   
treated for iron deficiency   
anemia, with plans to reassess in   
1 year with medical oncology. He   
was also scheduled for a liver   
MRI, however, needed the MRI to be   
open and also required anesthesia   
and was unable to obtain until   
2023. At this time, MRI did   
show a cirrhotic liver with a 4.1   
cm liver mass compatible with HCC   
in segment 4A. His case was   
discussed at liver tumor board   
with recommendation for pursuit of   
local therapy, and he has been   
referred for discussions of SBRT.  
  
Today he presents with his   
daughter and son-in-law on speaker   
phone. He reiterates the history   
provided above. He notes that he   
needed the MRI under anesthesia   
which was obtained without issue.   
He reports that his cancer was   
discovered via US as part of his   
workup for anemia. He has not had   
significant issues recently and   
tolerated MRI well. He denies   
headache, lightheadedness,   
dizziness, syncope. Eating and   
drinking OK, swallowing without   
issue. No chest pain, worsening   
SOB, cough or hemoptysis. No   
abdominal pain, N, V. Urination   
and defecation at baseline without   
blood. Walks with a walker no   
change from prior; no focal   
deficits.  
  
In terms of his past medical   
history, he has listed diagnoses   
of GERD, hypertension, CKD, CAREN,   
MGUS, peripheral neuropathy,   
polycystic kidney disease,   
splenomegaly, and   
thrombocytopenia. No prior   
diagnoses of cancer. No prior   
history of radiation therapy. No   
diagnosis of connective tissue   
disease, ulcerative colitis, or   
Crohn's disease.  
  
Prior radiation therapy, collagen   
vascular disease, or inflammatory   
bowel disease: No  
Pregnancy status: Male  
  
Residence: [place]  
Occupation: Retired   
  
WEIGHT  
Last 5 Encounter Wt Readings:  
Date: Wt:  
2023 108.9 kg (240 lb)  
2023 109.3 kg (241 lb)  
03/10/2023 108.2 kg (238 lb 9.6   
oz)  
2023 108.2 kg (238 lb 8 oz)  
2023 111.6 kg (246 lb)  
  
IMAGING  
2023: MRI liver  
IMPRESSION:  
  
Cirrhotic liver.  
  
4.1 cm liver mass compatible with   
HCC.  
  
Left lower lobe mucoid impaction.   
Bronchial wall thickening.  
  
RESULT:  
  
Hepatic morphology and masses:   
Cirrhotic morphology.  
  
Lesion#: 1  
Location: Segment Keren; (series 15   
image 19)  
Size (maximum long-axis   
dimension): 4.1 cm, Prior size:   
N/A  
Arterial Phase Hyperenhancement   
(APHE): Nonrim APHE  
Wash-out: Yes - non-peripheral  
Enhancing Capsule: Yes  
Threshold growth (50% or more   
growth in 6 months or less): N/A  
Ancillary features favoring   
malignancy or HCC: Fat in mass   
more than  
adjacent liver  
Ancillary features favoring   
benignity: N/A  
LI-RADS Category: LR-5 (definitely   
HCC)  
OPTN Class 5 Category:5B  
  
Hepatic vasculature and   
collaterals: ...  
  
Portal venous system (splenic   
vein, main portal vein, left and   
right  
anterior and right posterior   
portal vein branches): Patent.  
Portal vein diameter: 1.6 cm.  
Celiac trunk and SMA: Patent. No   
stenosis.  
Hepatic artery: Patent. Replaced   
right hepatic artery from SMA.  
Hepatic veins: Patent.  
Collaterals:  
Spontaneous splenorenal shunt:   
Absent  
Recanalized paraumbilical vein:   
Small  
Esophageal varices: Absent.  
Mesenteric portosystemic   
collaterals: Absent  
  
Related extrahepatic findings:  
  
Spleen: 13.7 cm (craniocaudally),   
enlarged. No mass.  
  
Mesentery/Peritoneum: Trace   
ascites. No mass.  
  
Other findings:  
  
Biliary: No bile duct dilation.   
Gallbladder present with   
cholelithiasis.  
  
Pancreas: No mass or duct   
dilation.  
  
Adrenals: No mass.  
  
Kidneys: Multiple bilateral cysts.   
No solid mass. No hydronephrosis.  
  
GI: No dilated bowel or wall   
thickening along imaged segments.   
Small  
hiatal hernia  
  
Lymph nodes: No abdominal   
lymphadenopathy.  
  
Vasculature: No abdominal aortic   
aneurysm.  
  
Bones/Soft Tissues: Degenerative   
change. Focal enhancement in the   
right  
L1 transverse process, likely   
posttraumatic. No other osseous   
lesion.  
  
Lower chest: Left lower lobe   
mucoid impaction. Bronchial wall   
thickening.  
  
LABS  
Component Latest Ref Rng & Units   
2023  
WBC 3.70 - 11.00 k/uL 3.87  
RBC 4.20 - 6.00 m/uL 3.63 (L)  
Hemoglobin 13.0 - 17.0 g/dL 11.8   
(L)  
Hematocrit 39.0 - 51.0 % 36.2 (L)  
MCV 80.0 - 100.0 fL 99.7  
MCH 26.0 - 34.0 pg 32.5  
MCHC 30.5 - 36.0 g/dL 32.6  
RDW-CV 11.5 - 15.0 % 20.0 (H)  
Platelet Count 150 - 400 k/uL 66   
(L)  
MPV 9.0 - 12.7 fL 9.8  
Neut% % 79.1  
Abs Neut (ANC) 1.45 - 7.50 k/uL   
3.06  
Lymph% % 11.1  
Abs Lymph 1.00 - 4.00 k/uL 0.43   
(L)  
Mono% % 6.2  
Abs Mono <0.87 k/uL 0.24  
Eosin% % 2.8  
Abs Eosin <0.46 k/uL 0.11  
Baso% % 0.5  
Abs Baso <0.11 k/uL <0.03  
Immature Gran % % 0.3  
IMMATURE GRANS (ABS) <0.10 k/uL   
<0.03  
NRBC /100 WBC 0.0  
Absolute nRBC <0.01 k/uL <0.01  
DTYPE Auto  
Protein, Total 6.3 - 8.0 g/dL 6.0   
(L)  
Albumin 3.9 - 4.9 g/dL 3.4 (L)  
Calcium 8.5 - 10.2 mg/dL 10.1  
Bilirubin, Total 0.2 - 1.3 mg/dL   
0.4  
Alkaline Phosphatase 38 - 113 U/L   
161 (H)  
AST 14 - 40 U/L 41 (H)  
ALT 10 - 54 U/L 29  
Glucose 74 - 99 mg/dL 97  
BUN 9 - 24 mg/dL 32 (H)  
Creatinine 0.73 - 1.22 mg/dL 1.01  
Sodium 136 - 144 mmol/L 137  
Potassium 3.7 - 5.1 mmol/L 4.2  
Chloride 97 - 105 mmol/L 106 (H)  
CO2 22 - 30 mmol/L 24  
Anion Gap 9 - 18 mmol/L 7 (L)  
eGFR >=60 mL/min/1.73m 74  
AFP <11.0 ng/mL 8.5  
  
ALLERGIES  
Allergen Reactions  
Bees Anaphylaxis  
Acetaminophen Other: See Comments  
Hydrocodone GI Upset  
Lipitor [Atorvastat* Itching  
Singulair [Monteluk* Itching  
Strawberries Hives  
hives  
  
PAST MEDICAL HISTORY  
Diagnosis Date  
Adenomatous colon polyp 8/3/2011  
Adjustment disorder with depressed   
mood 3/9/2012  
Aortic valve disorders 2007  
stenosis  
Asthma exacerbation 11/10/2012  
Calculus of ureter 2005  
Esophageal reflux 10/14/2011  
Essential hypertension 10/14/2005  
Impaired renal function 4/15/2010  
Impotence of organic origin   
2006  
Intestinal polyp 8/3/2011  
Nonspecific abnormal results of   
liver function study 2006  
Obesity, unspecified  
CAREN on CPAP 10/13/2005  
Other abnormal blood chemistry  
Hyperuricemia  
Other and unspecified   
hyperlipidemia 10/14/2005  
Peripheral polyneuropathy 2/3/2015  
Polycystic kidney 2010  
Snoring  
Splenomegaly 9/10/2014  
Thrombocytopenia (HCC) 10/5/2011  
  
PAST SURGICAL HISTORY  
Procedure Laterality Date  
ARTHRP KNE CONDYLE&PLATU   
MEDIAL&LAT COMPARTMENTS Left   
2017  
BAL. ASSIST ENTEROSCOPY W/ BX   
2011  
CAPSULE ENDO SMALL BOWEL W EGD   
2011  
COLONOSCOPY FLX DX W/COLLJ SPEC   
WHEN PFRMD   
Colonoscopy  
COLONOSCOPY FLX DX W/COLLJ SPEC   
WHEN PFRMD   
Colonoscopy  
COLONOSCOPY FLX DX W/COLLJ SPEC   
WHEN PFRMD 2011  
Colonoscopy  
COLONOSCOPY FLX DX W/COLLJ SPEC   
WHEN PFRMD 2018  
Colonoscopy  
COLONOSCOPY W/BIOPSY   
SINGLE/MULTIPLE 2008  
ESOPHAGOGASTRODUODENOSCOPY   
TRANSORAL DIAGNOSTIC 2018  
EGD  
ESOPHAGOGASTRODUODENOSCOPY   
TRANSORAL DIAGNOSTIC 2019  
EGD  
ESOPHAGOGASTRODUODENOSCOPY   
TRANSORAL DIAGNOSTIC 2020  
EGD  
EXCISION PILONIDAL CYST/SINUS   
SIMPLE 1964  
HEMORRHOIDECTOMY INTERNAL RUBBER   
BAND LIGATIONS 2018 and 18  
OPTX FEM SHFT FX W/INSJ IMED IMPLT   
W/WO SCREW Left 2020  
L hip cephalo medullary nail.  
RPLCMT AORTIC VALVE OPN   
W/STENTLESS TISSUE VALVE   
10/04/2011  
25-mm Liza-Stack   
pericardial prosthesis via upper   
ministernotomy.  
RPR UMBILICAL HRNA 5 YRS/>   
REDUCIBLE 09/10/2010  
Hernia repair, umbilical >5yr  
  
FAMILY HISTORY  
Problem Relation Age of Onset  
Heart Mother  
 in 70's  
Coronary Artery Disease Father  
 in his 80's  
Emphysema Father  
Diabetes Brother  
  
Social History  
  
Tobacco Use  
Smoking status: Never  
Smokeless tobacco: Never  
Vaping Use  
Vaping Use: Never used  
Substance Use Topics  
Alcohol use: No  
Drug use: No  
  
COMPLETE REVIEW OF SYSTEMS:  
See HPI  
  
PHYSICAL EXAM:  
VS: /58   Pulse 70   Resp 18   
  Wt 108.9 kg (240 lb)   SpO2 89%   
  BMI 37.59 kg/m  
KPS: 70  
General Appearance: Alert and   
oriented. No acute distress.  
HEENT: NCAT. Sclera anicteric.   
EOMI.  
Neck: Normal ROM.  
Chest: No respiratory distress.  
Heart: Regular rate.  
Abdomen: Soft. Nondistended.  
Musculoskeletal: Baseline ROM in   
extremities.  
Neuro: Speech fluent. No focal   
deficits.  
Skin: No rashes noted.  
Hematologic: No signs of active   
bleeding.  
  
RADIOLOGY/LABORATORY DATA: see HPI  
  
ASSESSMENT AND PLAN: 83-year-old   
male with newly diagnosed   
radiographic HCC per MRI obtained   
2023 showing a 4.1 cm tumor   
in segment 4A. Systemic staging,   
labs for classification (child   
Coffey, MELD) pending. Tentatively   
intermediate stage per BCLC, cT2   
N0 MX stage II per AJCC. Discussed   
at tumor board and referred for   
consideration of SBRT. Today we   
discussed his history of   
presentation and most recent   
imaging results. We discussed that   
his blood work and systemic   
staging with further imaging   
studies is still pending. Based on   
the information at hand, we   
discussed the role of stereotactic   
body radiation therapy in the   
treatment of hepatocellular   
carcinoma and its utility for his   
situation. We discussed the risks,   
benefits, alternatives,   
procedures, mechanics and   
personnel involved in treatment.   
We also discussed the possible   
acute and late side effects   
involved. Mr. Hurt verbalized   
understanding of these issues and   
all questions were answered at   
this time. He will be scheduled   
for simulation. Further discussion   
on Renate vs main [facility] and 3 vs 5   
fractions as per Dr. Fish   
addendum below. This was discussed   
with him and he is aware.  
  
Joseph Cowart MD  
Radiation Oncology Resident  
[id number]  
  
STAFF ADDENDUM: I have read and   
reviewed the above, as well as   
discussed with Dr. Cowart,   
reviewed the medical record, and   
confirmed with the patient. I   
agree with the above.  
  
In summary Mr. Braun is a very   
pleasant 83-year-old gentleman   
with a new diagnosis of   
hepatocellular carcinoma of the   
hepatic dome. Not a surgical   
candidate given his age, cirrhosis   
and medical comorbidities.  
  
Compared and contrasted available   
nonsurgical therapies including   
radiofrequency ablation,   
embolization, and stereotactic   
body radiation. Given location in   
the dome and adjacent to the heart   
not a candidate for radiofrequency   
ablation. Patient prefers   
noninvasive approach and   
stereotactic body radiation offers   
overall excellent local control   
and favorable toxicity profile.   
Reviewed relative RBA and patient   
demonstrates understanding and   
agreement to proceed. We will   
schedule simulation. Same day CT   
chest to complete staging.   
Tentatively plan for 45 Gray in 3   
fractions.  
  
60 minutes were spent in   
consultation greater than 50% of   
which was direct counseling   
regarding treatment decisions and   
coordination of care.  
  
Marin Fish MD  
  
cc: J[facility]ior Ahuja  
[address] [place] [address]  
Phone: [phone]  
Fax: [phone]  
  
Dr. Ortega, [facility]  
  
  
Electronically signed by Marin Fish MD at 2023 10:33   
AM EDT  
documented in this encounter            [facility]  
   
                                                    2023 Miscellaneous   
Notes                                   Formatting of this note might be   
different from the original.  
Left voicemail for patient's   
daughter about liver tumor board   
results  
See [employer] message from today  
Electronically signed by Maurizio Cochran PA-C at 2023 4:38 PM   
EDT  
documented in this encounter            [facility]  
   
                                                    2023 Miscellaneous   
Notes                                   Formatting of this note might be   
different from the original.  
Spoke to patient's daughter Dixie,   
informed her MRI is consistent   
with hepatocellular carcinoma and   
cirrhosis. Discussed need for   
referral to liver tumor board,   
hopefully this week or next  
Will need CT chest wo cont  
She has not scheduled   
EGD/colonoscopy as previously   
recommended due to wanting to see   
results of the MRI, patient has   
also been cranky recently  
  
From cirrhosis perspective, seems   
relatively well compensated but   
suspect he has some degree of   
portal HTN with thrombocytopenia.  
MELD labs ordered, will let her   
know outcome of liver tumor board   
meeting  
  
She verbalized understanding and   
thanked me for calling  
Electronically signed by Maurizio Cochran PA-C at 2023 4:36 PM   
EDT  
Formatting of this note might be   
different from the original.  
Patient's daughter Dixie called   
back, tried to transfer her to you   
but went to .  
Electronically signed by Shanna Spicer at 2023 3:41 PM EDT  
Formatting of this note might be   
different from the original.  
Attempted to call patient's   
daughter Dixie, to review MRI   
results today but had to leave   
voicemail, call back number given.  
  
If she returns my call, please let   
me know as I'd like to speak with   
her directly about the MRI   
findings. Will send [employer]   
message if unable to reach her by   
this evening as I am off tomorrow   
  
  
Maurizio Cochran PA-C  
2023 3:14 PM  
  
Electronically signed by Maurizio Cochran PA-C at 2023 3:15 PM   
EDT  
documented in this encounter            [facility]  
   
                                2023 Note                 [facility]  
   
                                2023 Note                 [facility]  
   
                                2023 Note                 [facility]  
   
                                2023 Note                 [facility]  
   
                                                    2023 History of   
Present illness Narrative               Formatting of this note might be   
different from the original.  
Radiology Service Progress Note  
  
PATIENT NAME: Mitesh Braun  
MRN: [id number]  
  
DATE OF SERVICE: 2023  
TIME: 1:01 PM  
PATIENT IDENTITY VERIFICATION   
COMPLETED USING TWO (2)   
IDENTIFIERS: Name and Date of   
Birth confirmed by patient   
verbally and Name and Date of   
Birth confirmed by identification   
band.  
FALL SCREENING: Has the patient   
had 2 falls in the last year or 1   
fall with injury or currently   
using an Ambulatory Assistive   
Device (Walker, Cane, Wheelchair,   
Crutches, etc.)? Yes, Patient High   
Risk for Falls  
What interventions were put in   
place to prevent falls during this   
visit? Patient was under   
anesthetic- no walking or   
transfer.  
PATIENT GENDER DATA: Male  
PATIENT RELEVANT IMPLANT DATA   
REVIEWED: Yes  
  
RADIOLOGY DEPARTMENT: MR; Exam(s)   
Completed: Body: Liver (routine)  
  
PERIPHERAL IV DATA: Site   
assessment: Clean,Dry and Intact,   
Site disposition Left in for next   
appointment  
  
SIGNED BY: RT Justine(R)  
2023 1:01 PM  
  
  
Electronically signed by RT Justine(MERNA) at 2023 1:03   
PM EDT  
documented in this encounter            [facility]  
   
                                                    2023 History of   
Present illness Narrative               Formatting of this note might be   
different from the original.  
Radiology Service Progress Note  
  
PATIENT NAME: Mitesh Braun  
MRN: [id number]  
  
DATE OF SERVICE: 2023  
TIME: 1:32 PM  
  
PATIENT IDENTITY VERIFICATION   
COMPLETED USING TWO (2) STANDARD   
IDENTIFIERS: Name and Date of   
Birth confirmed by patient   
verbally and Name and Date of   
Birth confirmed by identification   
band.  
PATIENT GENDER DATA: Male  
PATIENT RELEVANT IMPLANT DATA   
REVIEWED: Yes  
ALLERGIES: Reviewed and unchanged  
MEDICATIONS REVIEWED: NO  
  
IV SITE: Ambulatory: A peripheral   
IV was started in the Left   
antecubital site with a Angio   
cath: 20 gauge. and A Saline lock   
was inserted per protocol  
PERIPHERAL IV ACCESS: Discontinued  
ANXIOLYSIS/ANESTHESIA: Please see   
Outpatient Preoperative Nursing   
Care Record and Anesthesia Record   
for further details.  
  
PATIENT DISCHARGED TO: Home/Self   
Care with daughter Dixie  
SIGNED BY: Yasmine Alvarez RN  
2023 1:32 PM  
  
  
Electronically signed by Yasmine Alvarez RN at 2023 1:33 PM   
EDT  
documented in this encounter            [facility]  
   
                                        2023 Nurse Note Formatting of this  
 note is   
different from the original.  
Radiology Service Progress Note  
  
DATE OF SERVICE: 2023  
TIME: 11:20 AM  
  
PATIENT WEIGHT: 241 LBS  
PATIENT IDENTITY VERIFICATION   
COMPLETED USING TWO (2) STANDARD   
IDENTIFIERS: Name and Date of   
Birth confirmed by patient   
verbally and Name and Date of   
Birth confirmed by identification   
band.  
FALL SCREENING: Has the patient   
had 2 falls in the last year or 1   
fall with injury or currently   
using an Ambulatory Assistive   
Device (Walker, Cane, Wheelchair,   
Crutches, etc.)? No  
PATIENT GENDER DATA: Male  
ALLERGIES: Reviewed and unchanged  
CONTRAST ALLERGY: No  
  
EXAM: MRI - CONTRAST TYPE: GROUP   
II  
IV SITE: Ambulatory: A peripheral   
IV was started in the Left   
antecubital site with a Angio   
cath: 20 gauge. and A Saline lock   
was inserted per protocol  
IV SITE APPEARANCE: Clean,Dry and   
Intact  
  
SIGNATURE: Zakiya Rojas RN   
PATIENT NAME: Mitesh Branu  
DATE: 2023 MRN: [id number]  
TIME: 11:20 AM  
  
Radiology Service Progress Note  
  
PATIENT NAME: Mitesh Braun  
MRN: [id number]  
  
DATE OF SERVICE: 2023  
TIME: 11:20 AM  
  
PATIENT IDENTITY VERIFICATION   
COMPLETED USING TWO (2) STANDARD   
IDENTIFIERS: Name and Date of   
Birth confirmed by patient   
verbally and Name and Date of   
Birth confirmed by identification   
band.  
PATIENT GENDER DATA: Male  
PATIENT RELEVANT IMPLANT DATA   
REVIEWED: Yes  
ALLERGIES: Reviewed and unchanged  
MEDICATIONS REVIEWED: YES  
  
IV SITE: Ambulatory: A peripheral   
IV was started in the Left   
antecubital site with a Angio   
cath: 20 gauge. and A Saline lock   
was inserted per protocol  
PERIPHERAL IV ACCESS: Discontinued  
ANXIOLYSIS/ANESTHESIA: Please see   
Outpatient Preoperative Nursing   
Care Record and Anesthesia Record   
for further details.  
  
PATIENT DISCHARGED TO: Home/Self   
Care  
SIGNED BY: Zakiya Rojas RN  
2023 11:20 AM  
  
  
Electronically signed by Zakiya Rojas RN at 2023 11:21   
AM EDT  
documented in this encounter            [facility]  
   
                                                    2023 Miscellaneous   
Notes                                   Formatting of this note might be   
different from the original.  
Radiology Service Pre Anesthesia   
Telephone Call  
  
PATIENT NAME: Mitesh Barun  
MRN: [id number]  
  
DATE OF CALL: 2023  
TIME: 4:13 PM  
  
PATIENT TYPE: Adult patient 1. Has   
the patient ever had an MRI scan   
before? Yes, claustrophobia  
2. Does the patient have any   
implanted devices? No If yes,   
notify patient that additional   
follow up will be required.,  
3. Has the patient had a history   
and physical within this 30 day   
period? Yes.  
4. Diet instructions given. Yes.   
No solids for 8 hours prior to   
exam. Patient may take medications   
with sips of water.,  
5. Has the patient had lab work   
within the last 6 months? Yes,  
6. Is the patient diabetic? No,  
7. Is the patient taking pain   
medication? No,  
8. Parking and Check-in   
instructions given? Yes,  
9. Does the patient have a    
to take them home? Yes,  
10. Spoke with patient: No, spoke   
with Oscar, and All questions   
were addressed and answered.   
Patient's daughter verbalized   
understanding.  
  
SIGNED BY: Dotty Arias RN  
2023 4:13 PM  
  
  
Electronically signed by Dotty Arias RN at 2023 4:24 PM   
EDT  
documented in this encounter            [facility]  
  
  
  
                                          
   
                                          
   
                                          
   
                                          
   
                                          
   
                                          
   
                                          
   
                                          
   
                                          
   
                                          
   
                                          
   
                                          
   
                                          
   
                                          
   
                                          
   
                                          
   
                                          
   
                                          
   
                                          
   
                                          
   
                                          
   
                                          
   
                                          
   
                                          
   
                                          
   
                                          
   
                                          
   
                                          
   
                                          
   
                                          
   
                                          
   
                                          
   
                                          
   
                                          
  
documented as of this encounter (statuses as of 2023)  
[facility]03- History of Past illness Narrative*   
  
                          Problem      Noted Date   Diagnosed Date Resolved Date  
   
                          Stasis ulcer 2023  
   
                          Obesity, Class II, BMI 35-39.9 2021  
   
                          Medicare annual wellness visit, subsequent 2019  
   
                                                    Encounter for long-term (cur  
rent) use of   
medications         2018  
   
                                                      
  
  
Overview:Formatting of this note might be different from the original.  
Added automatically from request for surgery [id number]  
   
   
                          History of colonic polyps 2018  
   
                                                      
  
  
Overview:Formatting of this note might be different from the original.  
Added automatically from request for surgery [id number]  
   
   
                          Gastroesophageal reflux disease 2018  
   
                                                      
  
  
Overview:Formatting of this note might be different from the original.  
Added automatically from request for surgery [id number]  
   
   
                          Acute pulmonary edema 10/05/2011                10/06/  
2011  
   
                                                      
  
  
Overview:Formatting of this note might be different from the original.  
10/5/2011  
cardiogenic and noncardiogenic factors  
  
   
   
                          Atelectasis  10/05/2011                10/06/2011  
   
                          Hypotension  10/04/2011                10/06/2011  
   
                                                      
  
  
Overview:Formatting of this note might be different from the original.  
10/4/2011  
goal map 65-80  
   
   
                          Stress hyperglycemia 10/04/2011                10/06/2  
011  
   
                                                      
  
  
Overview:Formatting of this note might be different from the original.  
10/4/2011  
Perioperative insulin resistance and exacerbation of hyperglycemia.  
Control blood glucose with titration of insulin infusion  
  
10/5/2011  
adequate control  
goal FBG<180  
  
   
   
                          Post-op pain 10/04/2011                10/06/2011  
   
                          Mechanically assisted ventilation 10/04/2011            
      10/05/2011  
   
                                                      
  
  
Overview:Formatting of this note might be different from the original.  
10/4/2011  
optimize MV settings, WTE  
current setting:FiO2, 75%, peep 8  
   
   
                          Cardiac insufficiency 10/04/2011                10/05/  
2011  
   
                                                      
  
  
Overview:Formatting of this note might be different from the original.  
10/4/2011  
RV mild to moderate post pump  
goal CI>2.3  
   
   
                          Hypoxemia    10/04/2011                10/06/2011  
   
                          discharge planning 2011                10/19/201  
1  
   
                                                      
  
  
Overview:Formatting of this note might be different from the original.  
age 71,  lives in [place], [place]. No DC needs.  
/  
   
   
                          Pre-op testing 2011                10/04/2011  
   
                                                      
  
  
Overview:Formatting of this note is different from the original.  
Images from the original note were not included.  
[facility]  
PRE-OP CHECKLIST  
  
Surgeon: Deangelo Angulo M.D. Informed Consent Completed: No  
STS Score: 1.4%  
CAD: Yes - CAD on Problem List: Yes  
Is intended procedure a CABG: Yes - is a beta blocker ordered? Yes  
  
H & P completed: Yes  
  
PA/LAT: Completed CT: Completed MRI: N/A LE US: N/A  
  
Cath: Yes - reviewed: Yes Echo:Completed EKG: Completed EF %: 61  
  
PI's: N/A Carotid: Completed Mapping: N/A Dental: Completed PFT's: N/A  
  
Basename 11 0729  
WBC 7.18  
HB 13.1  
HCT 41.8  
  
INR 1.0  
CREAT 1.35  
  
UA: Normal  
HCG:N/A  
  
ABO/ABO Confirmed: Yes  
  
Blood ordered: No  
  
SA Swab: Yes - results: Pending  
  
Last Dose of Anticoagulation: vitamins  
  
Op Note: N/A  
  
Pacemaker Check: N/A  
  
Consults: GI 2001  
  
DM: No  
  
Cardiac Surgical prep: No  
  
SIGNATURE: Krystal Castellanos RN CHECKED BY:  
DATE of SERVICE: 2011  
TIME of SERVICE: 2:23 PM  
  
  
   
   
                          Anemia of chronic disease 2011  
   
                          Intestinal polyp 2011  
   
                                                      
  
  
Overview:Formatting of this note might be different from the original.  
Distal ileum ulcerated polyp.  
   
   
                                                    Nonspecific abnormal results  
 of liver   
function study      2006  
   
                          Impotence of organic origin 2006  
   
                                                    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  
9.9 (morbid   
obesity)                                                    2021  
  
documented as of this encounter (statuses as of 2023)  
[facility]- History of Past illness Narrative*   
  
                          Problem      Noted Date   Diagnosed Date Resolved Date  
   
                          Stasis ulcer 2023  
   
                          Obesity, Class II, BMI 35-39.9 2021  
   
                          Medicare annual wellness visit, subsequent 2019  
   
                                                    Encounter for long-term (cur  
rent) use of   
medications         2018  
   
                                                      
  
  
Overview:Formatting of this note might be different from the original.  
Added automatically from request for surgery [id number]  
   
   
                          History of colonic polyps 2018  
   
                                                      
  
  
Overview:Formatting of this note might be different from the original.  
Added automatically from request for surgery [id number]  
   
   
                          Gastroesophageal reflux disease 2018  
   
                                                      
  
  
Overview:Formatting of this note might be different from the original.  
Added automatically from request for surgery [id number]  
   
   
                          Acute pulmonary edema 10/05/2011                10/06/  
2011  
   
                                                      
  
  
Overview:Formatting of this note might be different from the original.  
10/5/2011  
cardiogenic and noncardiogenic factors  
  
   
   
                          Atelectasis  10/05/2011                10/06/2011  
   
                          Hypotension  10/04/2011                10/06/2011  
   
                                                      
  
  
Overview:Formatting of this note might be different from the original.  
10/4/2011  
goal map 65-80  
   
   
                          Stress hyperglycemia 10/04/2011                10/06/2  
011  
   
                                                      
  
  
Overview:Formatting of this note might be different from the original.  
10/4/2011  
Perioperative insulin resistance and exacerbation of hyperglycemia.  
Control blood glucose with titration of insulin infusion  
  
10/5/2011  
adequate control  
goal FBG<180  
  
   
   
                          Post-op pain 10/04/2011                10/06/2011  
   
                          Mechanically assisted ventilation 10/04/2011            
      10/05/2011  
   
                                                      
  
  
Overview:Formatting of this note might be different from the original.  
10/4/2011  
optimize MV settings, WTE  
current setting:FiO2, 75%, peep 8  
   
   
                          Cardiac insufficiency 10/04/2011                10/05/  
2011  
   
                                                      
  
  
Overview:Formatting of this note might be different from the original.  
10/4/2011  
RV mild to moderate post pump  
goal CI>2.3  
   
   
                          Hypoxemia    10/04/2011                10/06/2011  
   
                          discharge planning 2011                10/19/201  
1  
   
                                                      
  
  
Overview:Formatting of this note might be different from the original.  
age 71,  lives in [place], [place]. No DC needs.  
/  
   
   
                          Pre-op testing 2011                10/04/2011  
   
                                                      
  
  
Overview:Formatting of this note is different from the original.  
Images from the original note were not included.  
[facility]  
PRE-OP CHECKLIST  
  
Surgeon: Deangelo Angulo M.D. Informed Consent Completed: No  
STS Score: 1.4%  
CAD: Yes - CAD on Problem List: Yes  
Is intended procedure a CABG: Yes - is a beta blocker ordered? Yes  
  
H & P completed: Yes  
  
PA/LAT: Completed CT: Completed MRI: N/A LE US: N/A  
  
Cath: Yes - reviewed: Yes Echo:Completed EKG: Completed EF %: 61  
  
PI's: N/A Carotid: Completed Mapping: N/A Dental: Completed PFT's: N/A  
  
Basename 11 0729  
WBC 7.18  
HB 13.1  
HCT 41.8  
  
INR 1.0  
CREAT 1.35  
  
UA: Normal  
HCG:N/A  
  
ABO/ABO Confirmed: Yes  
  
Blood ordered: No  
  
SA Swab: Yes - results: Pending  
  
Last Dose of Anticoagulation: vitamins  
  
Op Note: N/A  
  
Pacemaker Check: N/A  
  
Consults: GI 2001  
  
DM: No  
  
Cardiac Surgical prep: No  
  
SIGNATURE: Krystal Castellanos RN CHECKED BY:  
DATE of SERVICE: 2011  
TIME of SERVICE: 2:23 PM  
  
  
   
   
                          Anemia of chronic disease 2011  
   
                          Intestinal polyp 2011  
   
                                                      
  
  
Overview:Formatting of this note might be different from the original.  
Distal ileum ulcerated polyp.  
   
   
                                                    Nonspecific abnormal results  
 of liver   
function study      2006  
   
                          Impotence of organic origin 2006  
   
                                                    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  
9.9 (morbid   
obesity)                                                    2021  
  
documented as of this encounter (statuses as of 2023)  
[facility]- History of Past illness Narrative*   
  
                          Problem      Noted Date   Diagnosed Date Resolved Date  
   
                          Stasis ulcer 2023  
   
                          Obesity, Class II, BMI 35-39.9 2021  
   
                          Medicare annual wellness visit, subsequent 2019  
   
                                                    Encounter for long-term (cur  
rent) use of   
medications         2018  
   
                                                      
  
  
Overview:Formatting of this note might be different from the original.  
Added automatically from request for surgery [id number]  
   
   
                          History of colonic polyps 2018  
   
                                                      
  
  
Overview:Formatting of this note might be different from the original.  
Added automatically from request for surgery [id number]  
   
   
                          Gastroesophageal reflux disease 2018  
   
                                                      
  
  
Overview:Formatting of this note might be different from the original.  
Added automatically from request for surgery [id number]  
   
   
                          Acute pulmonary edema 10/05/2011                10/06/  
2011  
   
                                                      
  
  
Overview:Formatting of this note might be different from the original.  
10/5/2011  
cardiogenic and noncardiogenic factors  
  
   
   
                          Atelectasis  10/05/2011                10/06/2011  
   
                          Hypotension  10/04/2011                10/06/2011  
   
                                                      
  
  
Overview:Formatting of this note might be different from the original.  
10/4/2011  
goal map 65-80  
   
   
                          Stress hyperglycemia 10/04/2011                10/06/2  
011  
   
                                                      
  
  
Overview:Formatting of this note might be different from the original.  
10/4/2011  
Perioperative insulin resistance and exacerbation of hyperglycemia.  
Control blood glucose with titration of insulin infusion  
  
10/5/2011  
adequate control  
goal FBG<180  
  
   
   
                          Post-op pain 10/04/2011                10/06/2011  
   
                          Mechanically assisted ventilation 10/04/2011            
      10/05/2011  
   
                                                      
  
  
Overview:Formatting of this note might be different from the original.  
10/4/2011  
optimize MV settings, WTE  
current setting:FiO2, 75%, peep 8  
   
   
                          Cardiac insufficiency 10/04/2011                10/05/  
2011  
   
                                                      
  
  
Overview:Formatting of this note might be different from the original.  
10/4/2011  
RV mild to moderate post pump  
goal CI>2.3  
   
   
                          Hypoxemia    10/04/2011                10/06/2011  
   
                          discharge planning 2011                10/19/201  
1  
   
                                                      
  
  
Overview:Formatting of this note might be different from the original.  
age 71,  lives in [place], [place]. No DC needs.  
/  
   
   
                          Pre-op testing 2011                10/04/2011  
   
                                                      
  
  
Overview:Formatting of this note is different from the original.  
Images from the original note were not included.  
[facility] [facility]  
[facility]  
  
Surgeon: Deangelo Angulo M.D. Informed Consent Completed: No  
STS Score: 1.4%  
CAD: Yes - CAD on Problem List: Yes  
Is intended procedure a CABG: Yes - is a beta blocker ordered? Yes  
  
H & P completed: Yes  
  
PA/LAT: Completed CT: Completed MRI: N/A LE US: N/A  
  
Cath: Yes - reviewed: Yes Echo:Completed EKG: Completed EF %: 61  
  
PI's: N/A Carotid: Completed Mapping: N/A Dental: Completed PFT's: N/A  
  
Basename 11 0729  
WBC 7.18  
HB 13.1  
HCT 41.8  
  
INR 1.0  
CREAT 1.35  
  
UA: Normal  
HCG:N/A  
  
ABO/ABO Confirmed: Yes  
  
Blood ordered: No  
  
SA Swab: Yes - results: Pending  
  
Last Dose of Anticoagulation: vitamins  
  
Op Note: N/A  
  
Pacemaker Check: N/A  
  
Consults: GI 2001  
  
DM: No  
  
Cardiac Surgical prep: No  
  
SIGNATURE: Krystal Castellanos RN CHECKED BY:  
DATE of SERVICE: 2011  
TIME of SERVICE: 2:23 PM  
  
  
   
   
                          Anemia of chronic disease 2011  
   
                          Intestinal polyp 2011  
   
                                                      
  
  
Overview:Formatting of this note might be different from the original.  
Distal ileum ulcerated polyp.  
   
   
                                                    Nonspecific abnormal results  
 of liver   
function study      2006  
   
                          Impotence of organic origin 2006  
   
                                                    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  
9.9 (morbid   
obesity)                                                    2021  
  
documented as of this encounter (statuses as of 2023)  
[facility]03- History of Past illness Narrative*   
  
                          Problem      Noted Date   Diagnosed Date Resolved Date  
   
                          Stasis ulcer 2023  
   
                          Obesity, Class II, BMI 35-39.9 2021  
   
                          Medicare annual wellness visit, subsequent 2019  
   
                                                    Encounter for long-term (cur  
rent) use of   
medications         2018  
   
                                                      
  
  
Overview:Formatting of this note might be different from the original.  
Added automatically from request for surgery [id number]  
   
   
                          History of colonic polyps 2018  
   
                                                      
  
  
Overview:Formatting of this note might be different from the original.  
Added automatically from request for surgery [id number]  
   
   
                          Gastroesophageal reflux disease 2018  
   
                                                      
  
  
Overview:Formatting of this note might be different from the original.  
Added automatically from request for surgery [id number]  
   
   
                          Acute pulmonary edema 10/05/2011                10/06/  
2011  
   
                                                      
  
  
Overview:Formatting of this note might be different from the original.  
10/5/2011  
cardiogenic and noncardiogenic factors  
  
   
   
                          Atelectasis  10/05/2011                10/06/2011  
   
                          Hypotension  10/04/2011                10/06/2011  
   
                                                      
  
  
Overview:Formatting of this note might be different from the original.  
10/4/2011  
goal map 65-80  
   
   
                          Stress hyperglycemia 10/04/2011                10/06/2  
011  
   
                                                      
  
  
Overview:Formatting of this note might be different from the original.  
10/4/2011  
Perioperative insulin resistance and exacerbation of hyperglycemia.  
Control blood glucose with titration of insulin infusion  
  
10/5/2011  
adequate control  
goal FBG<180  
  
   
   
                          Post-op pain 10/04/2011                10/06/2011  
   
                          Mechanically assisted ventilation 10/04/2011            
      10/05/2011  
   
                                                      
  
  
Overview:Formatting of this note might be different from the original.  
10/4/2011  
optimize MV settings, WTE  
current setting:FiO2, 75%, peep 8  
   
   
                          Cardiac insufficiency 10/04/2011                10/05/  
2011  
   
                                                      
  
  
Overview:Formatting of this note might be different from the original.  
10/4/2011  
RV mild to moderate post pump  
goal CI>2.3  
   
   
                          Hypoxemia    10/04/2011                10/06/2011  
   
                          discharge planning 2011                10/19/201  
1  
   
                                                      
  
  
Overview:Formatting of this note might be different from the original.  
age 71,  lives in [place], [place]. No DC needs.  
/  
   
   
                          Pre-op testing 2011                10/04/2011  
   
                                                      
  
  
Overview:Formatting of this note is different from the original.  
Images from the original note were not included.  
[facility]  
PRE-OP CHECKLIST  
  
Surgeon: Deangelo Angulo M.D. Informed Consent Completed: No  
STS Score: 1.4%  
CAD: Yes - CAD on Problem List: Yes  
Is intended procedure a CABG: Yes - is a beta blocker ordered? Yes  
  
H & P completed: Yes  
  
PA/LAT: Completed CT: Completed MRI: N/A LE US: N/A  
  
Cath: Yes - reviewed: Yes Echo:Completed EKG: Completed EF %: 61  
  
PI's: N/A Carotid: Completed Mapping: N/A Dental: Completed PFT's: N/A  
  
Basename 11 0729  
WBC 7.18  
HB 13.1  
HCT 41.8  
  
INR 1.0  
CREAT 1.35  
  
UA: Normal  
HCG:N/A  
  
ABO/ABO Confirmed: Yes  
  
Blood ordered: No  
  
SA Swab: Yes - results: Pending  
  
Last Dose of Anticoagulation: vitamins  
  
Op Note: N/A  
  
Pacemaker Check: N/A  
  
Consults: GI 2001  
  
DM: No  
  
Cardiac Surgical prep: No  
  
SIGNATURE: Krystal Castellanos RN CHECKED BY:  
DATE of SERVICE: 2011  
TIME of SERVICE: 2:23 PM  
  
  
   
   
                          Anemia of chronic disease 2011  
   
                          Intestinal polyp 2011  
   
                                                      
  
  
Overview:Formatting of this note might be different from the original.  
Distal ileum ulcerated polyp.  
   
   
                                                    Nonspecific abnormal results  
 of liver   
function study      2006  
   
                          Impotence of organic origin 2006  
   
                                                    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  
9.9 (morbid   
obesity)                                                    2021  
  
documented as of this encounter (statuses as of 2023)  
[facility]03- History of Past illness Narrative*   
  
                          Problem      Noted Date   Diagnosed Date Resolved Date  
   
                          Stasis ulcer 2023  
   
                          Obesity, Class II, BMI 35-39.9 2021  
   
                          Medicare annual wellness visit, subsequent 2019  
   
                                                    Encounter for long-term (cur  
rent) use of   
medications         2018  
   
                                                      
  
  
Overview:Formatting of this note might be different from the original.  
Added automatically from request for surgery [id number]  
   
   
                          History of colonic polyps 2018  
   
                                                      
  
  
Overview:Formatting of this note might be different from the original.  
Added automatically from request for surgery [id number]  
   
   
                          Gastroesophageal reflux disease 2018  
   
                                                      
  
  
Overview:Formatting of this note might be different from the original.  
Added automatically from request for surgery [id number]  
   
   
                          Acute pulmonary edema 10/05/2011                10/06/  
2011  
   
                                                      
  
  
Overview:Formatting of this note might be different from the original.  
10/5/2011  
cardiogenic and noncardiogenic factors  
  
   
   
                          Atelectasis  10/05/2011                10/06/2011  
   
                          Hypotension  10/04/2011                10/06/2011  
   
                                                      
  
  
Overview:Formatting of this note might be different from the original.  
10/4/2011  
goal map 65-80  
   
   
                          Stress hyperglycemia 10/04/2011                10/06/2  
011  
   
                                                      
  
  
Overview:Formatting of this note might be different from the original.  
10/4/2011  
Perioperative insulin resistance and exacerbation of hyperglycemia.  
Control blood glucose with titration of insulin infusion  
  
10/5/2011  
adequate control  
goal FBG<180  
  
   
   
                          Post-op pain 10/04/2011                10/06/2011  
   
                          Mechanically assisted ventilation 10/04/2011            
      10/05/2011  
   
                                                      
  
  
Overview:Formatting of this note might be different from the original.  
10/4/2011  
optimize MV settings, WTE  
current setting:FiO2, 75%, peep 8  
   
   
                          Cardiac insufficiency 10/04/2011                10/05/  
2011  
   
                                                      
  
  
Overview:Formatting of this note might be different from the original.  
10/4/2011  
RV mild to moderate post pump  
goal CI>2.3  
   
   
                          Hypoxemia    10/04/2011                10/06/2011  
   
                          discharge planning 2011                10/19/201  
1  
   
                                                      
  
  
Overview:Formatting of this note might be different from the original.  
age 71,  lives in [place], [place]. No DC needs.  
/  
   
   
                          Pre-op testing 2011                10/04/2011  
   
                                                      
  
  
Overview:Formatting of this note is different from the original.  
Images from the original note were not included.  
[facility]  
PRE-OP CHECKLIST  
  
Surgeon: Deangelo Angulo M.D. Informed Consent Completed: No  
STS Score: 1.4%  
CAD: Yes - CAD on Problem List: Yes  
Is intended procedure a CABG: Yes - is a beta blocker ordered? Yes  
  
H & P completed: Yes  
  
PA/LAT: Completed CT: Completed MRI: N/A LE US: N/A  
  
Cath: Yes - reviewed: Yes Echo:Completed EKG: Completed EF %: 61  
  
PI's: N/A Carotid: Completed Mapping: N/A Dental: Completed PFT's: N/A  
  
Basename 11 0729  
WBC 7.18  
HB 13.1  
HCT 41.8  
  
INR 1.0  
CREAT 1.35  
  
UA: Normal  
HCG:N/A  
  
ABO/ABO Confirmed: Yes  
  
Blood ordered: No  
  
SA Swab: Yes - results: Pending  
  
Last Dose of Anticoagulation: vitamins  
  
Op Note: N/A  
  
Pacemaker Check: N/A  
  
Consults: GI 2001  
  
DM: No  
  
Cardiac Surgical prep: No  
  
SIGNATURE: Krystal Castellanos RN CHECKED BY:  
DATE of SERVICE: 2011  
TIME of SERVICE: 2:23 PM  
  
  
   
   
                          Anemia of chronic disease 2011  
   
                          Intestinal polyp 2011  
   
                                                      
  
  
Overview:Formatting of this note might be different from the original.  
Distal ileum ulcerated polyp.  
   
   
                                                    Nonspecific abnormal results  
 of liver   
function study      2006  
   
                          Impotence of organic origin 2006  
   
                                                    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  
9.9 (morbid   
obesity)                                                    2021  
  
documented as of this encounter (statuses as of 2023)  
[facility]- History of Past illness Narrative*   
  
                          Problem      Noted Date   Diagnosed Date Resolved Date  
   
                          Stasis ulcer 2023  
   
                          Obesity, Class II, BMI 35-39.9 2021  
   
                          Medicare annual wellness visit, subsequent 2019  
   
                                                    Encounter for long-term (cur  
rent) use of   
medications         2018  
   
                                                      
  
  
Overview:Formatting of this note might be different from the original.  
Added automatically from request for surgery [id number]  
   
   
                          History of colonic polyps 2018  
   
                                                      
  
  
Overview:Formatting of this note might be different from the original.  
Added automatically from request for surgery [id number]  
   
   
                          Gastroesophageal reflux disease 2018  
   
                                                      
  
  
Overview:Formatting of this note might be different from the original.  
Added automatically from request for surgery [id number]  
   
   
                          Acute pulmonary edema 10/05/2011                10/06/  
2011  
   
                                                      
  
  
Overview:Formatting of this note might be different from the original.  
10/5/2011  
cardiogenic and noncardiogenic factors  
  
   
   
                          Atelectasis  10/05/2011                10/06/2011  
   
                          Hypotension  10/04/2011                10/06/2011  
   
                                                      
  
  
Overview:Formatting of this note might be different from the original.  
10/4/2011  
goal map 65-80  
   
   
                          Stress hyperglycemia 10/04/2011                10/06/2  
011  
   
                                                      
  
  
Overview:Formatting of this note might be different from the original.  
10/4/2011  
Perioperative insulin resistance and exacerbation of hyperglycemia.  
Control blood glucose with titration of insulin infusion  
  
10/5/2011  
adequate control  
goal FBG<180  
  
   
   
                          Post-op pain 10/04/2011                10/06/2011  
   
                          Mechanically assisted ventilation 10/04/2011            
      10/05/2011  
   
                                                      
  
  
Overview:Formatting of this note might be different from the original.  
10/4/2011  
optimize MV settings, WTE  
current setting:FiO2, 75%, peep 8  
   
   
                          Cardiac insufficiency 10/04/2011                10/05/  
2011  
   
                                                      
  
  
Overview:Formatting of this note might be different from the original.  
10/4/2011  
RV mild to moderate post pump  
goal CI>2.3  
   
   
                          Hypoxemia    10/04/2011                10/06/2011  
   
                          discharge planning 2011                10/19/201  
1  
   
                                                      
  
  
Overview:Formatting of this note might be different from the original.  
age 71,  lives in [place], [place]. No DC needs.  
/  
   
   
                          Pre-op testing 2011                10/04/2011  
   
                                                      
  
  
Overview:Formatting of this note is different from the original.  
Images from the original note were not included.  
[facility]  
PRE-OP CHECKLIST  
  
Surgeon: Deangelo Angulo M.D. Informed Consent Completed: No  
STS Score: 1.4%  
CAD: Yes - CAD on Problem List: Yes  
Is intended procedure a CABG: Yes - is a beta blocker ordered? Yes  
  
H & P completed: Yes  
  
PA/LAT: Completed CT: Completed MRI: N/A LE US: N/A  
  
Cath: Yes - reviewed: Yes Echo:Completed EKG: Completed EF %: 61  
  
PI's: N/A Carotid: Completed Mapping: N/A Dental: Completed PFT's: N/A  
  
Basename 11 0729  
WBC 7.18  
HB 13.1  
HCT 41.8  
  
INR 1.0  
CREAT 1.35  
  
UA: Normal  
HCG:N/A  
  
ABO/ABO Confirmed: Yes  
  
Blood ordered: No  
  
SA Swab: Yes - results: Pending  
  
Last Dose of Anticoagulation: vitamins  
  
Op Note: N/A  
  
Pacemaker Check: N/A  
  
Consults: GI 2001  
  
DM: No  
  
Cardiac Surgical prep: No  
  
SIGNATURE: Krystal Castellanos RN CHECKED BY:  
DATE of SERVICE: 2011  
TIME of SERVICE: 2:23 PM  
  
  
   
   
                          Anemia of chronic disease 2011  
   
                          Intestinal polyp 2011  
   
                                                      
  
  
Overview:Formatting of this note might be different from the original.  
Distal ileum ulcerated polyp.  
   
   
                                                    Nonspecific abnormal results  
 of liver   
function study      2006  
   
                          Impotence of organic origin 2006  
   
                                                    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  
9.9 (morbid   
obesity)                                                    2021  
  
documented as of this encounter (statuses as of 2023)  
[facility]- History of Past illness Narrative*   
  
                          Problem      Noted Date   Diagnosed Date Resolved Date  
   
                          Stasis ulcer 2023  
   
                          Obesity, Class II, BMI 35-39.9 2021  
   
                          Medicare annual wellness visit, subsequent 2019  
   
                                                    Encounter for long-term (cur  
rent) use of   
medications         2018  
   
                                                      
  
  
Overview:Formatting of this note might be different from the original.  
Added automatically from request for surgery [id number]  
   
   
                          History of colonic polyps 2018  
   
                                                      
  
  
Overview:Formatting of this note might be different from the original.  
Added automatically from request for surgery [id number]  
   
   
                          Gastroesophageal reflux disease 2018  
   
                                                      
  
  
Overview:Formatting of this note might be different from the original.  
Added automatically from request for surgery [id number]  
   
   
                          Acute pulmonary edema 10/05/2011                10/06/  
2011  
   
                                                      
  
  
Overview:Formatting of this note might be different from the original.  
10/5/2011  
cardiogenic and noncardiogenic factors  
  
   
   
                          Atelectasis  10/05/2011                10/06/2011  
   
                          Hypotension  10/04/2011                10/06/2011  
   
                                                      
  
  
Overview:Formatting of this note might be different from the original.  
10/4/2011  
goal map 65-80  
   
   
                          Stress hyperglycemia 10/04/2011                10/06/2  
011  
   
                                                      
  
  
Overview:Formatting of this note might be different from the original.  
10/4/2011  
Perioperative insulin resistance and exacerbation of hyperglycemia.  
Control blood glucose with titration of insulin infusion  
  
10/5/2011  
adequate control  
goal FBG<180  
  
   
   
                          Post-op pain 10/04/2011                10/06/2011  
   
                          Mechanically assisted ventilation 10/04/2011            
      10/05/2011  
   
                                                      
  
  
Overview:Formatting of this note might be different from the original.  
10/4/2011  
optimize MV settings, WTE  
current setting:FiO2, 75%, peep 8  
   
   
                          Cardiac insufficiency 10/04/2011                10/05/  
2011  
   
                                                      
  
  
Overview:Formatting of this note might be different from the original.  
10/4/2011  
RV mild to moderate post pump  
goal CI>2.3  
   
   
                          Hypoxemia    10/04/2011                10/06/2011  
   
                          discharge planning 2011                10/19/201  
1  
   
                                                      
  
  
Overview:Formatting of this note might be different from the original.  
age 71,  lives in [place], [place]. No DC needs.  
/  
   
   
                          Pre-op testing 2011                10/04/2011  
   
                                                      
  
  
Overview:Formatting of this note is different from the original.  
Images from the original note were not included.  
[facility]  
PRE-OP CHECKLIST  
  
Surgeon: Deangelo Angulo M.D. Informed Consent Completed: No  
STS Score: 1.4%  
CAD: Yes - CAD on Problem List: Yes  
Is intended procedure a CABG: Yes - is a beta blocker ordered? Yes  
  
H & P completed: Yes  
  
PA/LAT: Completed CT: Completed MRI: N/A LE US: N/A  
  
Cath: Yes - reviewed: Yes Echo:Completed EKG: Completed EF %: 61  
  
PI's: N/A Carotid: Completed Mapping: N/A Dental: Completed PFT's: N/A  
  
Basename 11 0729  
WBC 7.18  
HB 13.1  
HCT 41.8  
  
INR 1.0  
CREAT 1.35  
  
UA: Normal  
HCG:N/A  
  
ABO/ABO Confirmed: Yes  
  
Blood ordered: No  
  
SA Swab: Yes - results: Pending  
  
Last Dose of Anticoagulation: vitamins  
  
Op Note: N/A  
  
Pacemaker Check: N/A  
  
Consults: GI 2001  
  
DM: No  
  
Cardiac Surgical prep: No  
  
SIGNATURE: Krystal Castellanos RN CHECKED BY:  
DATE of SERVICE: 2011  
TIME of SERVICE: 2:23 PM  
  
  
   
   
                          Anemia of chronic disease 2011  
   
                          Intestinal polyp 2011  
   
                                                      
  
  
Overview:Formatting of this note might be different from the original.  
Distal ileum ulcerated polyp.  
   
   
                                                    Nonspecific abnormal results  
 of liver   
function study      2006  
   
                          Impotence of organic origin 2006  
   
                                                    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  
9.9 (morbid   
obesity)                                                    2021  
  
documented as of this encounter (statuses as of 2023)  
[facility]- History of Past illness Narrative*   
  
                          Problem      Noted Date   Diagnosed Date Resolved Date  
   
                          Stasis ulcer 2023  
   
                          Obesity, Class II, BMI 35-39.9 2021  
   
                          Medicare annual wellness visit, subsequent 2019  
   
                                                    Encounter for long-term (cur  
rent) use of   
medications         2018  
   
                                                      
  
  
Overview:Formatting of this note might be different from the original.  
Added automatically from request for surgery [id number]  
   
   
                          History of colonic polyps 2018  
   
                                                      
  
  
Overview:Formatting of this note might be different from the original.  
Added automatically from request for surgery [id number]  
   
   
                          Gastroesophageal reflux disease 2018  
   
                                                      
  
  
Overview:Formatting of this note might be different from the original.  
Added automatically from request for surgery [id number]  
   
   
                          Acute pulmonary edema 10/05/2011                10/06/  
2011  
   
                                                      
  
  
Overview:Formatting of this note might be different from the original.  
10/5/2011  
cardiogenic and noncardiogenic factors  
  
   
   
                          Atelectasis  10/05/2011                10/06/2011  
   
                          Hypotension  10/04/2011                10/06/2011  
   
                                                      
  
  
Overview:Formatting of this note might be different from the original.  
10/4/2011  
goal map 65-80  
   
   
                          Stress hyperglycemia 10/04/2011                10/06/2  
011  
   
                                                      
  
  
Overview:Formatting of this note might be different from the original.  
10/4/2011  
Perioperative insulin resistance and exacerbation of hyperglycemia.  
Control blood glucose with titration of insulin infusion  
  
10/5/2011  
adequate control  
goal FBG<180  
  
   
   
                          Post-op pain 10/04/2011                10/06/2011  
   
                          Mechanically assisted ventilation 10/04/2011            
      10/05/2011  
   
                                                      
  
  
Overview:Formatting of this note might be different from the original.  
10/4/2011  
optimize MV settings, WTE  
current setting:FiO2, 75%, peep 8  
   
   
                          Cardiac insufficiency 10/04/2011                10/05/  
2011  
   
                                                      
  
  
Overview:Formatting of this note might be different from the original.  
10/4/2011  
RV mild to moderate post pump  
goal CI>2.3  
   
   
                          Hypoxemia    10/04/2011                10/06/2011  
   
                          discharge planning 2011                10/19/201  
1  
   
                                                      
  
  
Overview:Formatting of this note might be different from the original.  
age 71,  lives in [place], [place]. No DC needs.  
/  
   
   
                          Pre-op testing 2011                10/04/2011  
   
                                                      
  
  
Overview:Formatting of this note is different from the original.  
Images from the original note were not included.  
[facility]  
PRE-OP CHECKLIST  
  
Surgeon: Deangelo Angulo M.D. Informed Consent Completed: No  
STS Score: 1.4%  
CAD: Yes - CAD on Problem List: Yes  
Is intended procedure a CABG: Yes - is a beta blocker ordered? Yes  
  
H & P completed: Yes  
  
PA/LAT: Completed CT: Completed MRI: N/A LE US: N/A  
  
Cath: Yes - reviewed: Yes Echo:Completed EKG: Completed EF %: 61  
  
PI's: N/A Carotid: Completed Mapping: N/A Dental: Completed PFT's: N/A  
  
Basename 11 0729  
WBC 7.18  
HB 13.1  
HCT 41.8  
  
INR 1.0  
CREAT 1.35  
  
UA: Normal  
HCG:N/A  
  
ABO/ABO Confirmed: Yes  
  
Blood ordered: No  
  
SA Swab: Yes - results: Pending  
  
Last Dose of Anticoagulation: vitamins  
  
Op Note: N/A  
  
Pacemaker Check: N/A  
  
Consults: GI 2001  
  
DM: No  
  
Cardiac Surgical prep: No  
  
SIGNATURE: Krystal Castellanos RN CHECKED BY:  
DATE of SERVICE: 2011  
TIME of SERVICE: 2:23 PM  
  
  
   
   
                          Anemia of chronic disease 2011  
   
                          Intestinal polyp 2011  
   
                                                      
  
  
Overview:Formatting of this note might be different from the original.  
Distal ileum ulcerated polyp.  
   
   
                                                    Nonspecific abnormal results  
 of liver   
function study      2006  
   
                          Impotence of organic origin 2006  
   
                                                    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  
9.9 (morbid   
obesity)                                                    2021  
  
documented as of this encounter (statuses as of 2023)  
[facility]03- History of Past illness Narrative*   
  
                          Problem      Noted Date   Diagnosed Date Resolved Date  
   
                          Stasis ulcer 2023  
   
                          Obesity, Class II, BMI 35-39.9 2021  
   
                          Medicare annual wellness visit, subsequent 2019  
   
                                                    Encounter for long-term (cur  
rent) use of   
medications         2018  
   
                                                      
  
  
Overview:Formatting of this note might be different from the original.  
Added automatically from request for surgery [id number]  
   
   
                          History of colonic polyps 2018  
   
                                                      
  
  
Overview:Formatting of this note might be different from the original.  
Added automatically from request for surgery [id number]  
   
   
                          Gastroesophageal reflux disease 2018  
   
                                                      
  
  
Overview:Formatting of this note might be different from the original.  
Added automatically from request for surgery [id number]  
   
   
                          Acute pulmonary edema 10/05/2011                10/06/  
2011  
   
                                                      
  
  
Overview:Formatting of this note might be different from the original.  
10/5/2011  
cardiogenic and noncardiogenic factors  
  
   
   
                          Atelectasis  10/05/2011                10/06/2011  
   
                          Hypotension  10/04/2011                10/06/2011  
   
                                                      
  
  
Overview:Formatting of this note might be different from the original.  
10/4/2011  
goal map 65-80  
   
   
                          Stress hyperglycemia 10/04/2011                10/06/2  
011  
   
                                                      
  
  
Overview:Formatting of this note might be different from the original.  
10/4/2011  
Perioperative insulin resistance and exacerbation of hyperglycemia.  
Control blood glucose with titration of insulin infusion  
  
10/5/2011  
adequate control  
goal FBG<180  
  
   
   
                          Post-op pain 10/04/2011                10/06/2011  
   
                          Mechanically assisted ventilation 10/04/2011            
      10/05/2011  
   
                                                      
  
  
Overview:Formatting of this note might be different from the original.  
10/4/2011  
optimize MV settings, WTE  
current setting:FiO2, 75%, peep 8  
   
   
                          Cardiac insufficiency 10/04/2011                10/05/  
2011  
   
                                                      
  
  
Overview:Formatting of this note might be different from the original.  
10/4/2011  
RV mild to moderate post pump  
goal CI>2.3  
   
   
                          Hypoxemia    10/04/2011                10/06/2011  
   
                          discharge planning 2011                10/19/201  
1  
   
                                                      
  
  
Overview:Formatting of this note might be different from the original.  
age 71,  lives in [place], [place]. No DC needs.  
/  
   
   
                          Pre-op testing 2011                10/04/2011  
   
                                                      
  
  
Overview:Formatting of this note is different from the original.  
Images from the original note were not included.  
[facility]  
PRE-OP CHECKLIST  
  
Surgeon: Deangelo Angulo M.D. Informed Consent Completed: No  
STS Score: 1.4%  
CAD: Yes - CAD on Problem List: Yes  
Is intended procedure a CABG: Yes - is a beta blocker ordered? Yes  
  
H & P completed: Yes  
  
PA/LAT: Completed CT: Completed MRI: N/A LE US: N/A  
  
Cath: Yes - reviewed: Yes Echo:Completed EKG: Completed EF %: 61  
  
PI's: N/A Carotid: Completed Mapping: N/A Dental: Completed PFT's: N/A  
  
Basename 11 0729  
WBC 7.18  
HB 13.1  
HCT 41.8  
  
INR 1.0  
CREAT 1.35  
  
UA: Normal  
HCG:N/A  
  
ABO/ABO Confirmed: Yes  
  
Blood ordered: No  
  
SA Swab: Yes - results: Pending  
  
Last Dose of Anticoagulation: vitamins  
  
Op Note: N/A  
  
Pacemaker Check: N/A  
  
Consults: GI 2001  
  
DM: No  
  
Cardiac Surgical prep: No  
  
SIGNATURE: Krystal Castellanos RN CHECKED BY:  
DATE of SERVICE: 2011  
TIME of SERVICE: 2:23 PM  
  
  
   
   
                          Anemia of chronic disease 2011  
   
                          Intestinal polyp 2011  
   
                                                      
  
  
Overview:Formatting of this note might be different from the original.  
Distal ileum ulcerated polyp.  
   
   
                                                    Nonspecific abnormal results  
 of liver   
function study      2006  
   
                          Impotence of organic origin 2006  
   
                                                    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  
9.9 (morbid   
obesity)                                                    2021  
  
documented as of this encounter (statuses as of 2023)  
[facility]03- History of Past illness Narrative*   
  
                          Problem      Noted Date   Diagnosed Date Resolved Date  
   
                          Stasis ulcer 2023  
   
                          Obesity, Class II, BMI 35-39.9 2021  
   
                          Medicare annual wellness visit, subsequent 2019  
   
                                                    Encounter for long-term (cur  
rent) use of   
medications         2018  
   
                                                      
  
  
Overview:Formatting of this note might be different from the original.  
Added automatically from request for surgery [id number]  
   
   
                          History of colonic polyps 2018  
   
                                                      
  
  
Overview:Formatting of this note might be different from the original.  
Added automatically from request for surgery [id number]  
   
   
                          Gastroesophageal reflux disease 2018  
   
                                                      
  
  
Overview:Formatting of this note might be different from the original.  
Added automatically from request for surgery [id number]  
   
   
                          Acute pulmonary edema 10/05/2011                10/06/  
2011  
   
                                                      
  
  
Overview:Formatting of this note might be different from the original.  
10/5/2011  
cardiogenic and noncardiogenic factors  
  
   
   
                          Atelectasis  10/05/2011                10/06/2011  
   
                          Hypotension  10/04/2011                10/06/2011  
   
                                                      
  
  
Overview:Formatting of this note might be different from the original.  
10/4/2011  
goal map 65-80  
   
   
                          Stress hyperglycemia 10/04/2011                10/06/2  
011  
   
                                                      
  
  
Overview:Formatting of this note might be different from the original.  
10/4/2011  
Perioperative insulin resistance and exacerbation of hyperglycemia.  
Control blood glucose with titration of insulin infusion  
  
10/5/2011  
adequate control  
goal FBG<180  
  
   
   
                          Post-op pain 10/04/2011                10/06/2011  
   
                          Mechanically assisted ventilation 10/04/2011            
      10/05/2011  
   
                                                      
  
  
Overview:Formatting of this note might be different from the original.  
10/4/2011  
optimize MV settings, WTE  
current setting:FiO2, 75%, peep 8  
   
   
                          Cardiac insufficiency 10/04/2011                10/05/  
2011  
   
                                                      
  
  
Overview:Formatting of this note might be different from the original.  
10/4/2011  
RV mild to moderate post pump  
goal CI>2.3  
   
   
                          Hypoxemia    10/04/2011                10/06/2011  
   
                          discharge planning 2011                10/19/201  
1  
   
                                                      
  
  
Overview:Formatting of this note might be different from the original.  
age 71,  lives in [place], [place]. No DC needs.  
/  
   
   
                          Pre-op testing 2011                10/04/2011  
   
                                                      
  
  
Overview:Formatting of this note is different from the original.  
Images from the original note were not included.  
[facility]  
PRE-OP CHECKLIST  
  
Surgeon: Deangelo Angulo M.D. Informed Consent Completed: No  
STS Score: 1.4%  
CAD: Yes - CAD on Problem List: Yes  
Is intended procedure a CABG: Yes - is a beta blocker ordered? Yes  
  
H & P completed: Yes  
  
PA/LAT: Completed CT: Completed MRI: N/A LE US: N/A  
  
Cath: Yes - reviewed: Yes Echo:Completed EKG: Completed EF %: 61  
  
PI's: N/A Carotid: Completed Mapping: N/A Dental: Completed PFT's: N/A  
  
Basename 11 0729  
WBC 7.18  
HB 13.1  
HCT 41.8  
  
INR 1.0  
CREAT 1.35  
  
UA: Normal  
HCG:N/A  
  
ABO/ABO Confirmed: Yes  
  
Blood ordered: No  
  
SA Swab: Yes - results: Pending  
  
Last Dose of Anticoagulation: vitamins  
  
Op Note: N/A  
  
Pacemaker Check: N/A  
  
Consults: GI 2001  
  
DM: No  
  
Cardiac Surgical prep: No  
  
SIGNATURE: Krystal Castellanos RN CHECKED BY:  
DATE of SERVICE: 2011  
TIME of SERVICE: 2:23 PM  
  
  
   
   
                          Anemia of chronic disease 2011  
   
                          Intestinal polyp 2011  
   
                                                      
  
  
Overview:Formatting of this note might be different from the original.  
Distal ileum ulcerated polyp.  
   
   
                                                    Nonspecific abnormal results  
 of liver   
function study      2006  
   
                          Impotence of organic origin 2006  
   
                                                    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  
9.9 (morbid   
obesity)                                                    2021  
  
documented as of this encounter (statuses as of 10/19/2023)  
[facility]03- History of Past illness Narrative*   
  
                          Problem      Noted Date   Diagnosed Date Resolved Date  
   
                          Stasis ulcer 2023  
   
                          Obesity, Class II, BMI 35-39.9 2021  
   
                          Medicare annual wellness visit, subsequent 2019  
   
                                                    Encounter for long-term (cur  
rent) use of   
medications         2018  
   
                                                      
  
  
Overview:Formatting of this note might be different from the original.  
Added automatically from request for surgery [id number]  
   
   
                          History of colonic polyps 2018  
   
                                                      
  
  
Overview:Formatting of this note might be different from the original.  
Added automatically from request for surgery [id number]  
   
   
                          Gastroesophageal reflux disease 2018  
   
                                                      
  
  
Overview:Formatting of this note might be different from the original.  
Added automatically from request for surgery [id number]  
   
   
                          Acute pulmonary edema 10/05/2011                10/06/  
2011  
   
                                                      
  
  
Overview:Formatting of this note might be different from the original.  
10/5/2011  
cardiogenic and noncardiogenic factors  
  
   
   
                          Atelectasis  10/05/2011                10/06/2011  
   
                          Hypotension  10/04/2011                10/06/2011  
   
                                                      
  
  
Overview:Formatting of this note might be different from the original.  
10/4/2011  
goal map 65-80  
   
   
                          Stress hyperglycemia 10/04/2011                10/06/2  
011  
   
                                                      
  
  
Overview:Formatting of this note might be different from the original.  
10/4/2011  
Perioperative insulin resistance and exacerbation of hyperglycemia.  
Control blood glucose with titration of insulin infusion  
  
10/5/2011  
adequate control  
goal FBG<180  
  
   
   
                          Post-op pain 10/04/2011                10/06/2011  
   
                          Mechanically assisted ventilation 10/04/2011            
      10/05/2011  
   
                                                      
  
  
Overview:Formatting of this note might be different from the original.  
10/4/2011  
optimize MV settings, WTE  
current setting:FiO2, 75%, peep 8  
   
   
                          Cardiac insufficiency 10/04/2011                10/05/  
2011  
   
                                                      
  
  
Overview:Formatting of this note might be different from the original.  
10/4/2011  
RV mild to moderate post pump  
goal CI>2.3  
   
   
                          Hypoxemia    10/04/2011                10/06/2011  
   
                          discharge planning 2011                10/19/201  
1  
   
                                                      
  
  
Overview:Formatting of this note might be different from the original.  
age 71,  lives in [place], [place]. No DC needs.  
/  
   
   
                          Pre-op testing 2011                10/04/2011  
   
                                                      
  
  
Overview:Formatting of this note is different from the original.  
Images from the original note were not included.  
[facility]  
PRE-OP CHECKLIST  
  
Surgeon: Deangelo Angulo M.D. Informed Consent Completed: No  
STS Score: 1.4%  
CAD: Yes - CAD on Problem List: Yes  
Is intended procedure a CABG: Yes - is a beta blocker ordered? Yes  
  
H & P completed: Yes  
  
PA/LAT: Completed CT: Completed MRI: N/A LE US: N/A  
  
Cath: Yes - reviewed: Yes Echo:Completed EKG: Completed EF %: 61  
  
PI's: N/A Carotid: Completed Mapping: N/A Dental: Completed PFT's: N/A  
  
Basename 11 0729  
WBC 7.18  
HB 13.1  
HCT 41.8  
  
INR 1.0  
CREAT 1.35  
  
UA: Normal  
HCG:N/A  
  
ABO/ABO Confirmed: Yes  
  
Blood ordered: No  
  
SA Swab: Yes - results: Pending  
  
Last Dose of Anticoagulation: vitamins  
  
Op Note: N/A  
  
Pacemaker Check: N/A  
  
Consults: GI 2001  
  
DM: No  
  
Cardiac Surgical prep: No  
  
SIGNATURE: Krystal Castellanos RN CHECKED BY:  
DATE of SERVICE: 2011  
TIME of SERVICE: 2:23 PM  
  
  
   
   
                          Anemia of chronic disease 2011  
   
                          Intestinal polyp 2011  
   
                                                      
  
  
Overview:Formatting of this note might be different from the original.  
Distal ileum ulcerated polyp.  
   
   
                                                    Nonspecific abnormal results  
 of liver   
function study      2006  
   
                          Impotence of organic origin 2006  
   
                                                    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  
9.9 (morbid   
obesity)                                                    2021  
  
documented as of this encounter (statuses as of 2023)  
[facility]03- History of Past illness Narrative*   
  
                          Problem      Noted Date   Diagnosed Date Resolved Date  
   
                          Stasis ulcer 2023  
   
                          Obesity, Class II, BMI 35-39.9 2021  
   
                          Medicare annual wellness visit, subsequent 2019  
   
                                                    Encounter for long-term (cur  
rent) use of   
medications         2018  
   
                                                      
  
  
Overview:Formatting of this note might be different from the original.  
Added automatically from request for surgery [id number]  
   
   
                          History of colonic polyps 2018  
   
                                                      
  
  
Overview:Formatting of this note might be different from the original.  
Added automatically from request for surgery [id number]  
   
   
                          Gastroesophageal reflux disease 2018  
   
                                                      
  
  
Overview:Formatting of this note might be different from the original.  
Added automatically from request for surgery [id number]  
   
   
                          Acute pulmonary edema 10/05/2011                10/06/  
2011  
   
                                                      
  
  
Overview:Formatting of this note might be different from the original.  
10/5/2011  
cardiogenic and noncardiogenic factors  
  
   
   
                          Atelectasis  10/05/2011                10/06/2011  
   
                          Hypotension  10/04/2011                10/06/2011  
   
                                                      
  
  
Overview:Formatting of this note might be different from the original.  
10/4/2011  
goal map 65-80  
   
   
                          Stress hyperglycemia 10/04/2011                10/06/2  
011  
   
                                                      
  
  
Overview:Formatting of this note might be different from the original.  
10/4/2011  
Perioperative insulin resistance and exacerbation of hyperglycemia.  
Control blood glucose with titration of insulin infusion  
  
10/5/2011  
adequate control  
goal FBG<180  
  
   
   
                          Post-op pain 10/04/2011                10/06/2011  
   
                          Mechanically assisted ventilation 10/04/2011            
      10/05/2011  
   
                                                      
  
  
Overview:Formatting of this note might be different from the original.  
10/4/2011  
optimize MV settings, WTE  
current setting:FiO2, 75%, peep 8  
   
   
                          Cardiac insufficiency 10/04/2011                10/05/  
2011  
   
                                                      
  
  
Overview:Formatting of this note might be different from the original.  
10/4/2011  
RV mild to moderate post pump  
goal CI>2.3  
   
   
                          Hypoxemia    10/04/2011                10/06/2011  
   
                          discharge planning 2011                10/19/201  
1  
   
                                                      
  
  
Overview:Formatting of this note might be different from the original.  
age 71,  lives in [place], [place]. No DC needs.  
/  
   
   
                          Pre-op testing 2011                10/04/2011  
   
                                                      
  
  
Overview:Formatting of this note is different from the original.  
Images from the original note were not included.  
[facility]  
PRE-OP CHECKLIST  
  
Surgeon: Deangelo Angulo M.D. Informed Consent Completed: No  
STS Score: 1.4%  
CAD: Yes - CAD on Problem List: Yes  
Is intended procedure a CABG: Yes - is a beta blocker ordered? Yes  
  
H & P completed: Yes  
  
PA/LAT: Completed CT: Completed MRI: N/A LE US: N/A  
  
Cath: Yes - reviewed: Yes Echo:Completed EKG: Completed EF %: 61  
  
PI's: N/A Carotid: Completed Mapping: N/A Dental: Completed PFT's: N/A  
  
Basename 11 0729  
WBC 7.18  
HB 13.1  
HCT 41.8  
  
INR 1.0  
CREAT 1.35  
  
UA: Normal  
HCG:N/A  
  
ABO/ABO Confirmed: Yes  
  
Blood ordered: No  
  
SA Swab: Yes - results: Pending  
  
Last Dose of Anticoagulation: vitamins  
  
Op Note: N/A  
  
Pacemaker Check: N/A  
  
Consults: GI 2001  
  
DM: No  
  
Cardiac Surgical prep: No  
  
SIGNATURE: Krystal Castellanos RN CHECKED BY:  
DATE of SERVICE: 2011  
TIME of SERVICE: 2:23 PM  
  
  
   
   
                          Anemia of chronic disease 2011  
   
                          Intestinal polyp 2011  
   
                                                      
  
  
Overview:Formatting of this note might be different from the original.  
Distal ileum ulcerated polyp.  
   
   
                                                    Nonspecific abnormal results  
 of liver   
function study      2006  
   
                          Impotence of organic origin 2006  
   
                                                    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  
9.9 (morbid   
obesity)                                                    2021  
  
documented as of this encounter (statuses as of 2023)  
[facility]
  * Patient Instructions*   
  
Júnior Ahuja MD - 2023 7:33 PM EDT  
  
Formatting of this note might be different from the original.  
STOP FUROSEMIDE WHILE ON TORSEMIDE X 5 DAYS.  
Electronically signed by Júnior Ahuja MD at 2023 7:33 PM EDT  
  
  
  
[facility] in this [facility]- History of Present 
illness Narrative* Júnior Ahuja MD - 2023 7:16 PM EDTFormatting of 
  this note is different from the original.  
This note was created using NoteWriter.  
Subjective  
Patient presents with:  
[facility] follow[facility]  
  
Mitesh Braun is a 83 year old male here for EC follow up. He was seen 3/10 
for venous stasis ulceration of the right calf and cellulitis. He was treated 
with doxycycline with mild improvement. Hewas advised [facility], and he has 
an appointment tomorrow for initial evaluation, possibly with Dr. Jaime Madrigal. 
He also had a rash on his left leg that was not only itchy but burning.  
  
He had blisters of his left medial hand near the wrist from handling hot jelly.  
  
Review of Systems  
Constitutional: Negative for chills and fever.  
Respiratory: Negative for shortness of breath.  
Cardiovascular: Positive for leg swelling.  
Skin: Positive for rash and wound.  
  
ACTIVE PROBLEM LIST  
Essential hypertension  
Caren On Cpap  
Esophageal Reflux  
Gout  
Mixed hyperlipidemia  
Impaired Fasting Glucose  
Aortic Valve Disorder  
Polycystic Kidney  
Adenomatous Colon Polyp  
Thrombocytopenia (HCC)  
Cad (Coronary Artery Disease), Native Coronary Artery  
Adjustment Disorder With Depressed Mood  
Asthma Exacerbation  
Peripheral Polyneuropathy  
Splenomegaly  
Ckd (Chronic Kidney Disease) Stage 3, Gfr 30-59 Ml/Min ([facility])  
Abnormality of Gait  
Anemia of Chronic Disease  
Urge Incontinence of Urine  
Obesity, Class II, Bmi 35-39.9  
Frequent Falls  
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  
Iron Malabsorption  
Liver Lesion  
  
Current Outpatient Medications  
Medication Sig  
oxybutynin ER (DITROPAN XL) 15 mg 24 hr Extended Rel Tab Take 2 tablets by mouth
 once daily.  
furosemide (LASIX) 20 mg tablet TAKE 1 TABLET BY MOUTH EVERY OTHER RDAY  
Diaper,Brief, Adult,Disposable (DEPEND EASY FIT UNDERGARMENTS) 1 Each three 
times daily.  
ascorbic acid, vitamin C, (VITAMIN C) 500 mg tablet Take 1 tablet by mouth once 
daily.  
ascorbic acid-elderberry fruit 100-50 mg chew Take 2 tablets by mouth once 
daily.  
cyanocobalamin (VITAMIN B-12) 2,500 mcg tablet Take 2,500 mcg by mouth once 
daily.  
melatonin 10 mg tab Take 1 tablet by mouth as needed.  
aspirin, enteric coated (ASPIRIN, ENTERIC COATED) 81 mg EC tablet Take 81 mg by 
mouth once daily.  
albuterol HFA (PROVENTIL HFA, VENTOLIN HFA) 90 mcg/actuation inhaler Inhale 2 
Puffs as instructed every 4 hours as needed for wheezing/shortness of breath.  
omeprazole (PRILOSEC) 40 mg capsule Take 1 capsule by mouth once daily.  
allopurinol (ZYLOPRIM) 300 mg tablet Take 1 tablet by mouth once daily.  
magnesium oxide 400 mg magnesium cap Take 1 capsule by mouth once daily.  
fenofibrate nanocrystallized (TRICOR) 48 mg tablet Take 1 tablet by mouth once 
daily.  
sertraline (ZOLOFT) 50 mg tablet Take 1 tablet by mouth once daily.  
nitroglycerin sublingual (NITROQUICK) 0.4 mg SL tablet Dissolve 1 tablet under 
the tongue every 5 minutes as needed for chest pain. FOR CHEST PAIN. IF NO 
RELIEF CALL 911  
fluticasone (FLONASE) 50 mcg/actuation nasal spray Use 1 Spray in each nostril 
once daily.  
ferrous sulfate 325 mg (65 mg iron) EC tablet Take 1 tablet by mouth twice daily
 with meals. (Patient taking differently: Take 325 mg by mouth once daily.)  
amoxicillin (POLYMOX, AMOXIL) 500 mg capsule Take four tablets, one hour prior 
to dental work (Patient taking differently: Take four tablets by mouth , one 
hour prior to dental work)  
fluticasone-vilanterol (BREO ELLIPTA) 200-25 mcg/dose inhaler Inhale 1 
Inhalation as instructed once daily. Inhale one puff once daily. DO NOT CLICK 
OPEN UNTIL READY FOR DOSE  
multivitamin tablet Take 1 tablet by mouth once daily.  
torsemide (DEMADEX) 20 mg tablet Take 1 tablet by mouth once daily for 5 days.  
  
No current facility-administered medications for this visit.  
  
  
Objective  
/72 (BP Site: Left Arm, BP Position: Sitting, BP Cuff Size: Large Adult)  
 Pulse 72   Wt 109.3 kg (241 lb)   SpO2 100%   BMI 37.75 kg/m  
Physical Exam  
Constitutional:  
General: He is not in acute distress.  
Appearance: He is not ill-appearing.  
Cardiovascular:  
Rate and Rhythm: Normal rate and regular rhythm.  
Comments: Lymphedema. Right leg with scattered excoriated superficial small 
ulcers, some with surrounding erythema, most are non weeping, some with serous 
drainage. No lymphangitic streaking, no tenderness. Left leg with few ulcers. 
Left medial knee with palm sized cluster of zosteriform post inflammatory 
pigmented maculopapules.  
Pulmonary:  
Breath sounds: Normal breath sounds.  
Musculoskeletal:  
Right lower leg: 3+ Edema present.  
Left lower le+ Edema present.  
Skin:  
Comments: 2 small, vesicles of the medial left hand from burn. No s/s infection.  
Neurological:  
Mental Status: He is alert.  
  
Assessment and Plan  
  
1. Venous stasis ulcer of other part of right lower leg limited to breakdown of 
skin without varicose veins (HCC) - ICD9: 459.81, 707.15, ICD10: I87.2, L97.811 
(primary diagnosis)  
- Keep [facility] appointment.  
- TORSEMIDE 20 MG TABLET. 40 mg daily x 5 days. Hold furosemide while on this.  
- CEPHALEXIN 500 MG CAPSULE. 4 times per day x 7 days.  
- We can consider extending torsemide, but will need to monitor his CKD. Call 
back if needed.  
  
2. Rash - ICD9: 782.1, ICD10: R21  
Probably zoster, beyond window for antiviral.  
  
3. Partial thickness burn of left hand, unspecified site of hand, initial 
encounter - ICD9: 944.20,ICD10: T23.A  
- Do not puncture. If it drains, keep clean and apply antibiotic ointment.  
  
Júnior Ahuja MD  
  
Electronically signed by Júnior Ahuja MD at 2023 8:39 AM EDT  
  
documented in this encounterK[facility]- Miscellaneous Notes* 
  Telephone Encounter - Son Norton Ma - 2023 3:17 PM EDTFormatting of this
   note might be different from the original.  
Faxed.  
Electronically signed by Son Norton Ma at 2023 3:17 PM EDT  
  
* Telephone Encounter - Starr Marquez LPN - 2023 10:55 AM EDTFormatting
   of this note might be different from the original.  
Cece from [facility] calling patient had called to schedule appt, they 
have no referral. [facility] was [facility] Dr [facility]. Fax number is [phone], pending order needs diagnosis.  
Please advise  
Electronically signed by Starr Marquez LPN at 2023 11:00 AM EDT  
  
documented in this encounterK[facility]- [facility] of Present illness Narrative* Marin Rankin MD -
   03/10/2023 2:27 PM ESTFormatting of this note is different from the original.  
Patient presents with:  
sores on lower legs: X 2 weeks  
  
HPI:  
Skin Lesion:  
Location: right lower leg  
Duration: 2 weeks  
Pruritis/Pain: was pruritic, neosporin takes the hurt away  
Change: starting to seep watery liquid  
Drainage/blister/pustule/ulceration: open sores, red.  
Treatment: neosporin  
  
Denies chest pain, shortness of breath, dizziness, palpitations, fever, chills.  
Medication compliance: no changes recently. Sees heart and kidney doctors, no 
recent changes.  
  
MEDICATIONS:  
oxybutynin ER (DITROPAN XL) 15 mg 24 hr Extended Rel Tab Take 2 tablets by mouth
 once daily.  
furosemide (LASIX) 20 mg tablet TAKE 1 TABLET BY MOUTH EVERY OTHER RDAY  
Diaper,Brief, Adult,Disposable (DEPEND EASY FIT UNDERGARMENTS) 1 Each three 
times daily.  
ascorbic acid, vitamin C, (VITAMIN C) 500 mg tablet Take 1 tablet by mouth once 
daily.  
ascorbic acid-elderberry fruit 100-50 mg chew Take 2 tablets by mouth once 
daily.  
cyanocobalamin (VITAMIN B-12) 2,500 mcg tablet Take 2,500 mcg by mouth once 
daily.  
melatonin 10 mg tab Take 1 tablet by mouth as needed.  
aspirin, enteric coated (ASPIRIN, ENTERIC COATED) 81 mg EC tablet Take 81 mg by 
mouth once daily.  
albuterol HFA (PROVENTIL HFA, VENTOLIN HFA) 90 mcg/actuation inhaler Inhale 2 
Puffs as instructed every 4 hours as needed for wheezing/shortness of breath.  
omeprazole (PRILOSEC) 40 mg capsule Take 1 capsule by mouth once daily.  
allopurinol (ZYLOPRIM) 300 mg tablet Take 1 tablet by mouth once daily.  
magnesium oxide 400 mg magnesium cap Take 1 capsule by mouth once daily.  
fenofibrate nanocrystallized (TRICOR) 48 mg tablet Take 1 tablet by mouth once 
daily.  
sertraline (ZOLOFT) 50 mg tablet Take 1 tablet by mouth once daily.  
nitroglycerin sublingual (NITROQUICK) 0.4 mg SL tablet Dissolve 1 tablet under 
the tongue every 5 minutes as needed for chest pain. FOR CHEST PAIN. IF NO 
RELIEF CALL 911  
fluticasone (FLONASE) 50 mcg/actuation nasal spray Use 1 Spray in each nostril 
once daily.  
ferrous sulfate 325 mg (65 mg iron) EC tablet Take 1 tablet by mouth twice daily
 with meals. (Patient taking differently: Take 325 mg by mouth once daily.)  
amoxicillin (POLYMOX, AMOXIL) 500 mg capsule Take four tablets, one hour prior 
to dental work (Patient taking differently: Take four tablets by mouth , one 
hour prior to dental work)  
fluticasone-vilanterol (BREO ELLIPTA) 200-25 mcg/dose inhaler Inhale 1 
Inhalation as instructed once daily. Inhale one puff once daily. DO NOT CLICK 
OPEN UNTIL READY FOR DOSE  
multivitamin tablet Take 1 tablet by mouth once daily.  
torsemide (DEMADEX) 20 mg tablet Take 1 tablet by mouth once daily for 5 days.  
  
ALLERGIES:  
ALLERGIES  
Allergen Reactions  
Bees Anaphylaxis  
Acetaminophen Other: See Comments  
Hydrocodone GI Upset  
Lipitor [Atorvastat* Itching  
Singulair [[place]* Itching  
Strawberries Hives  
hives  
  
VITALS:  
/82   Pulse 92   Temp 36.7 C (98 F) (Tympanic)   Resp 18   Wt 108.2 kg 
(238 lb 9.6 oz)   [id number]%   BMI 37.37 kg/m  
  
Last 6 Encounter Wt Readings:  
Date: Wt:  
03/10/2023 108.2 kg (238 lb 9.6 oz)  
2023 108.2 kg (238 lb 8 oz)  
2023 111.6 kg (246 lb)  
2023 114.3 kg (252 lb)  
2023 114.3 kg (252 lb)  
2022 110.9 kg (244 lb 8 oz)  
  
PE:  
Pleasant, in no acute distress. Sitting in wheeled chair. Accompanied by his 
daughter.  
EXT: bilateral lymphedema. 2cm dry red based ulcer left upper shin; few 
millimeters of erythematoushalo.  
Numerous excoriation ulcers with red base anterior right lower leg from mid shin
 to ankle. Ulcers ranging in size from 1/2 to 2 cm. Surrounding rythema 
increases to confluent at and above the ankle. There is a 3 cm deeper ulceration
 with some white granulation medial lower calf.  
  
ASSESSMENT/PLAN:  
1. Venous stasis ulcer of right calf limited to breakdown of skin without 
varicose veins (HCC) - ICD9: 459.81, 707.12, ICD10: I87.2, L97.211 (primary 
diagnosis)  
2. Cellulitis of right lower leg - ICD9: 682.6, ICD10: L03.115  
Inflammation from open sores, possible early cellulitis.  
- DOXYCYCLINE MONOHYDRATE 100 MG CAPSULE  
Change ointment to bacitracin which has lower allergy incidence.  
Elevate legs when able.  
W[facility] Center recommended. He will call to schedule with [facility] [place].  
  
Marin Rankin MD  
  
  
Electronically signed by Marin Rankin MD at 03/10/2023 2:55 PM EST  
  
documented in this encounterK[facility]- M[facility]ellaneous Notes* 
  Telephone Encounter - KELSEA Rahman RN - 2023 12:57 PM ESTFormatting of
   this note is different from the original.  
Patient has been identified by name and date of birth: Yes, Provider Dr. Ahuja Date 23 Time 12:58 pm  
Patient phones for refill(s):  
Requested Prescriptions  
  
Pending Prescriptions Disp Refills  
oxybutynin ER (DITROPAN XL) 15 mg 24 hr Extended Rel Tab 180 tablet 3  
Sig: Take 2 tablets by mouth once daily.  
  
Date of last office visit with pcp: 23. Next appt: 23  
Last 2 Encounter Wt Readings:  
Date: Wt:  
2023 108.2 kg (238 lb 8 oz)  
2023 111.6 kg (246 lb)  
Previous labs/tests for medication: Blood Pressure:  
BUN (mg/dL)  
Date Value  
2023 32  
2022 28  
  
Sodium (mmol/L)  
Date Value  
2023 137  
2022 139  
Last 1 Encounter BP Readings:  
Date: BP:  
2023 122/72  
Liver Function:  
ALT (U/L)  
Date Value  
2023 29  
2021 18  
2021 19  
  
AST (U/L)  
Date Value  
2023 41  
2021 30  
2021 31  
  
Please advise. Thank you. KELSEA Rahman RN  
  
Electronically signed by KELSEA Rahman RN at 2023 12:59 PM EST  
  
documented in this encounterK[facility]- [facility]- History of Present illness Narrative* Tello Ortega, DO - 
  2023 9:33 AM ESTFormatting of this note is different from the original.  
Hematologic problem(s):  
1) IgG kappa monoclonal gammopathy  
2) BREEZY.  
3) Liver mass.  
4) Hypercalcemia.  
5) Splenomegaly.  
6) Cirrhosis.  
  
HPI: The patient is an 81 yo male with a PMH significant for HTN, polycystic 
kidney disease (seeingnephrology for ~15 years), HLD, aortic repair (bovine 
valve), CAD, CAREN (CPAP), asthma, hypercalcemia, splenomegaly (noted on CT chest 
2014; spleen up to 18.4 cm in AP dimension; not enlarged on CT enterography 
) and obesity.  
  
He had lab work ordered at his nephrologist office. Protein electrophoresis of 
the urine demonstrated no evidence of monoclonal spike. Urine protein creatinine
 ratio was elevated. Chemistry panel showed a creatinine of 1.33 mg/dL. Calcium 
mildly elevated 10.6 mg/dL. Vitamin D39.7 PTH 93.4 CBC significant for a total 
white count of 4900. No differential performed. Hemoglobin 9.0 g/dL. . 
Platelet count 79,000. Protein electrophoresis of the serum revealed a M spike 
but no immunofixation was performed.  
  
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 Worse over time.
 He's never been given an answer as to why.  
  
Balance is off and he uses wheeled walker.  
  
[place] alone [place]. Has home health aides for cleaning and washing 
clothes [facility]. Daughter lives close by and he gets meals on wheels.  
  
Was having left posterior hip pain and got relief with Absorsene Jr.  
  
Presents for ongoing hematologic management.  
  
Interim history:  
He offers no complaints today.  
  
He received 10 doses of iron sucrose.  
Feels much better.  
  
Sensory neuropathy symptoms stable.  
Seen by neurology group in [place].  
Was told nothing to be done for neuropathy.  
Uses wheeled walker.  
  
Better appetite. Gets meals on wheels.  
No abdominal pain or bloating.  
  
Underwent bone marrow biopsy at [facility].  
  
ROS:  
Constitutional: Denies episodes of fever and night sweats.  
Neuro: See above. He has mild left hemiparesis.  
HEENT: No recent change in voice, vision or hearing.  
Resp: Denies cough, wheeze and hemoptysis. Denies shortness of breath at rest.  
CVS: Denies exertional chest pain, PND, orthopnea. Chronic bilateral lower 
extremity swelling.  
GI: Bowels typically move twice a day. Formed stools. Black from iron. No blood 
observed..  
: Denies dysuria or gross hematuria.  
Endo: Denies hot flashes. Denies polyuria and polydipsia. Denies heat and cold 
intolerance.  
Musculoskeletal: No musculoskeletal pain other than left hip pain as outlined 
above.  
Derm: Denies rash. Denies jaundice and diffuse pruritis.  
Heme: Denies unusual bleeding and unexplained bruising.  
Psych: Normal mood.  
  
PHYSICAL EXAM:  
Vitals: Blood pressure 122/72, pulse 66, temperature 36.3 C (97.3 F), weight 
108.2 kg (238 lb 8 oz), SpO2 99 %.  
Well-appearing and in no acute distress.  
EYES: Sclerae are anicteric bilaterally.  
LYMPHATIC: There is no palpable cervical or supraclavicular adenopathy.  
RESPIRATORY: Inspiratory breath sounds are of normal intensity in all fields. No
 rales, wheezes or rhonchi.  
CARDIOVASCULAR: Rhythm is regular.  
ABDOMEN: The abdomen is nondistended.  
Extremities: Bilateral lower extremity swelling. Right somewhat worse than left.
 Overlying pitting.Stable.  
SKIN: No jaundice.  
NEUROLOGIC: CNs II-XII are grossly intact.  
  
LABS:  
Component Latest Ref Rng & Units 2022  
WBC 3.70 - 11.00 k/uL 5.22 4.65 3.87  
RBC 4.20 - 6.00 m/uL 3.27 (L) 3.73 (L) 3.63 (L)  
Hemoglobin 13.0 - 17.0 g/dL 10.9 (L) 11.1 (L) 11.8 (L)  
Hematocrit 39.0 - 51.0 % 34.6 (L) 36.0 (L) 36.2 (L)  
MCV 80.0 - 100.0 fL 105.8 (H) 96.5 99.7  
MCH 26.0 - 34.0 pg 33.3 29.8 32.5  
MCHC 30.5 - 36.0 g/dL 31.5 30.8 32.6  
RDW-CV 11.5 - 15.0 % 15.0 16.9 (H) 20.0 (H)  
Platelet Count 150 - 400 k/uL 69 (L) 98 (L) 66 (L)  
MPV 9.0 - 12.7 fL 12.2 11.4 9.8  
Neut% % 78.9 79.1  
Abs Neut (ANC) 1.45 - 7.50 k/uL 3.67 3.06  
Lymph% % 10.8 11.1  
Abs Lymph 1.00 - 4.00 k/uL 0.50 (L) 0.43 (L)  
Mono% % 5.6 6.2  
Abs Mono <0.87 k/uL 0.26 0.24  
Eosin% % 3.4 2.8  
Abs Eosin <0.46 k/uL 0.16 0.11  
Baso% % 0.9 0.5  
Abs Baso <0.11 k/uL 0.04 <0.03  
Immature Gran % % 0.4 0.3  
IMMATURE GRANS (ABS) <0.10 k/uL <0.03 <0.03  
NRBC /100 WBC 0.0 0.0  
Absolute nRBC <0.01 k/uL <0.01 <0.01 <0.01  
DTYPE Auto Auto  
Iron 41 - 186 ug/dL 30 (L) 46  
TIBC 232 - 386 ug/dL 370 360  
Transferrin Saturation 15.0 - 57.0 % 8.1 (L) 12.8 (L)  
Ferritin 30.3 - 565.7 ng/mL 24.8 (L) 50.4  
  
Component Latest Ref Rng & Units 2021  
Protein, Total 6.3 - 8.0 g/dL 6.4 5.8 (L) 6.6  
Albumin 3.9 - 4.9 g/dL 3.8 (L) 3.5 (L) 3.8 (L)  
Calcium 8.5 - 10.2 mg/dL 10.6 (H) 10.8 (H) 10.8 (H)  
Bilirubin, Total 0.2 - 1.3 mg/dL 0.5 0.4 0.5  
Alkaline Phosphatase 38 - 113 U/L 62 85 111  
AST 14 - 40 U/L 31 30 35  
Glucose 74 - 99 mg/dL 103 (H) 108 (H) 98  
BUN 9 - 24 mg/dL 26 (H) 33 (H) 27 (H)  
Creatinine 0.73 - 1.22 mg/dL 1.16 1.31 (H) 1.01  
Sodium 136 - 144 mmol/L 141 140 135 (L)  
Potassium 3.7 - 5.1 mmol/L 4.8 5.5 (H) 4.0  
Chloride 97 - 105 mmol/L 109 (H) 108 (H) 103  
CO2 22 - 30 mmol/L 23 25 25  
Anion Gap 9 - 18 mmol/L 9 7 (L) 7 (L)  
ALT 10 - 54 U/L 19 21 19  
eGFR-African American >60  
eGFR-All Other Races . >60  
eGFR >=60 mL/min/1.73m 54 (L) 74  
  
Component Latest Ref Rng & Units 2022  
Cold Agglut, 4 degrees C <1:32 Dilutions <1:32  
Cold Agglut, 37 degrees C Dilutions Test not indicated when titer is <1:32 at 4 
degrees C.  
  
Component Latest Ref Rng & Units 2022  
PT Sec <13.1 sec 10.9  
PT INR 0.9 - 1.3 1.1  
Pathologist Interpretation, CBCDIF Normocytic anemia with slight polychromasia .
 . .  
Pathologist (PASHA) Reviewed by Savanah Cole M.D., Ph.D  
APTT 23.0 - 32.4 sec 26.2  
Fibrinogen Ag 149 - 353 mg/dL 353  
  
Component Latest Ref Rng & Units 2022  
IgG 700 - 1,600 mg/dL 1,120  
IgA 70 - 400 mg/dL 346  
IgM 40 - 230 mg/dL 79  
  
Component Latest Ref Rng & Units 2022  
Albumin 3.43 - 5.41 g/dL 3.62  
Alpha 1 Globulin 0.18 - 0.43 g/dL 0.32  
Alpha 2 Globulin 0.42 - 0.98 g/dL 0.66  
Beta Globulin 0.61 - 1.17 g/dL 0.87  
Gamma Globulin 0.53 - 1.51 g/dL 1.12  
Interpretation (Prot Electro) No definitive M protein is identified on protein 
electrophoresis. An M protein is identified on protein electrophoresis. (A)  
Interpretation Comment for Protein Electrophoresis See separate immunofixation 
report for characterization of monoclonal gammopathy. . . .  
M-Protein Location Gamma Fraction 1  
M-Protein Concentration <=0.00 g/dL 0.30 (H)  
SPE Staff Review Reviewed by Sanaz Parrish MD  
  
Component Latest Ref Rng & Units 2022  
Kappa Free, Serum 3.3 - 19.4 mg/L 65.0 (H)  
Lambda Free, Serum 5.7 - 26.3 mg/L 37.1 (H)  
K/L Ratio, Serum 0.26 - 1.65 1.75 (H)  
  
IgG kappa on immunofixation of serum.  
  
Component Latest Ref Rng & Units 2023  
PTH, Intact 15 - 65 pg/mL 69 (H)  
  
PATHOLOGY:  
Bone marrow biopsy done at [facility] 2023:  
Hypercellular bone marrow with trilineage hematopoiesis.  
No evidence of lymphoproliferative disorder or plasma cell dyscrasia.  
  
Flow cytometry did not reveal evidence of lymphoproliferative disorder. There 
was no monoclonal plasma cell neoplasm.  
  
Plasma cells reported at 2% on aspirate.  
Iron was reported as rare stainable iron.  
  
Fluorescence in situ hybridization for BCR ABL was negative.  
  
46 XY [20].  
  
IMAGING:  
US 2022:  
IMPRESSION:  
1. There is a new hyperechoic nodule in the right lobe of the liver  
which could be further evaluated by MRI  
2. Nodular contour of the liver which may be due to cirrhosis  
3. Splenomegaly  
  
Bone survey 2022:  
IMPRESSION:  
SEVERAL SUBCENTIMETER LUCENT AREAS IN THE CALVARIUM.  
  
DIFFUSE OSTEOPENIA.  
  
DEGENERATIVE CHANGES AS DESCRIBED. POSTOPERATIVE CHANGES AS DESCRIBED.  
  
NO ADDITIONAL FOCAL LYTIC OR BLASTIC ABNORMALITY IS APPRECIATED.  
  
ASSESSMENT/PLAN:  
(D47.2) MGUS (monoclonal gammopathy of unknown significance) (primary encounter 
diagnosis)  
(D47.2) IgG monoclonal gammopathy  
Assessment:  
-The patient is an 82-year-old male with a past medical history as outlined 
above. He has no history of diabetes but has longstanding worsening sensory 
neuropathy of the feet and left hand. He has polycystic kidney disease as well 
as chronic mild hypercalcemia with elevation of PTH and stable serumcreatinine 
over time. Referred for possible serum monoclonal antibody on electrophoresis of
 the serum. Urine negative for monoclonal protein on electrophoresis.  
-Low-level IgG kappa monoclonal gammopathy.  
-Previous hypercalcemia secondary to hyperparathyroidism.  
-Bone survey revealed no lytic lesions.  
-Previous hypercalcemia from hyperparathyroidism.  
-I reviewed the results of the bone marrow biopsy in detail with the patient and
 his daughter. No evidence of smoldering myeloma or multiple myeloma.  
-I discussed the natural history, treated course, and prognosis of MGUS with the
 patient and his daughter.  
Plan:  
-Reassess in 1 year pending results of today's lab work.  
  
(D50.0) Iron deficiency anemia due to chronic blood loss  
(D69.6) Thrombocytopenia (HCC)  
Assessment:  
-He also has macrocytic anemia and thrombocytopenia. Prior reports of 
splenomegaly. I previously personally reviewed CT images from  and . In 
2014, spleen measured up to about 16 cm in craniocaudal dimension. No mention of
 fatty liver or other morphologic changes of the liver to suggest cirrhosis.  
-Ultrasound suggestive of cirrhosis with splenomegaly.  
-Likely some splenic sequestration of platelets.  
-Ferritin was low indicating iron deficiency. Feels much better after parenteral
 iron.  
-He is going to be scheduled with GI for colonoscopy at [facility]. Could not 
be done here.  
Plan:  
-Follow-up with colonoscopy.  
-Repeat CBC and iron studies in about 3 months.  
  
(E21.3) Hyperparathyroidism (HCC)  
Assessment:  
-Serum PTH level elevated.  
Plan:  
-Defer management to PCP.  
  
(K74.69) Other cirrhosis of liver (HCC)  
Assessment:  
-Established with hepatology.  
-Was told needs closed MRI. Open would not give adequate pictures to further 
investigate the potential mass.  
Plan:  
-Follow up with hepatology.  
  
Portions of this documentation were copied and pasted from previous office visit
 notes in order to provide a cohesive continuity of the history. The note has 
been reviewed and edited and updated as necessary.  
  
I spent a total of 40 minutes on the date of the service which included 
preparing to see the patient, face-to-face patient care, completing clinical 
documentation, obtaining and/or reviewing separately obtained history, 
performing a medically appropriate examination, counseling and educating the pat
ient/family/caregiver, communicating with other HCPs (not separately reported), 
independently interpreting results (not separately reported), and communicating 
results to the patient/family/caregiver.  
  
Tello Ortega DO  
Electronically signed by Tello Ortega DO at 2023 10:10 AM EST  
  
documented in this encounterK[facility]- Miscellaneous Notes* 
  Telephone Encounter - Sarah Calle LPN - 2023 10:03 AM ESTFormatting of 
  this note might be different from the original.  
Faxed office note and message of referral request to Dr Donohue.  
  
Phone # [phone] fax# [phone]  
  
  
Electronically signed by Sarah Calle LPN at 2023 10:06 AM EST  
  
* Telephone Encounter - Nathalia Bernal PA-C - 2023 8:17 AM ESTFormatting of 
  this note might be different from the original.  
Please fax referral request including my recent office note to Dr. Donohue's 
office for EGD with possible banding of esophageal varices, and colonoscopy for 
anemia and history of colon polyps  
Electronically signed by Nathalia Bernal PA-C at 2023 8:18 AM EST  
  
documented in this encounterK[facility]- Miscellaneous Notes* 
  Telephone Encounter - Marilin Sanchez - 2023 2:48 PM ESTFormatting of this
   note might be different from the original.  
Pt's D2 canceled and added on as a new D10  
Electronically signed by Marilin Sanchez at 2023 2:50 PM EST  
  
* Telephone Encounter - Cherelle Casillas LPN - 2023 1:52 PM ESTFormatting of
   this note might be different from the original.  
Daughter is aware of Dr. Ortega's recommendations. She is afraid that the patient
 is too weak and she will not be able to get him out of the house and into a car
 for urgent care/PCP visit. I instructed her if that was the case to call a 
squad and have patient transported to the ER.  
  
PSS- D2 iron will need rescheduled.  
  
Cherelle Casillas LPN  
  
Electronically signed by Cherelle Casillas LPN at 2023 1:58 PM EST  
  
* Telephone Encounter - Glenna Oneill RN - 2023 1:30 PM ESTFormatting of 
  this note might be different from the original.  
Called daughter, no answer, left a message requesting a call back.  
  
Per Dr. Ortega verbal response, okay to cancel iron today so patient can be 
evaluated elsewhere.  
  
Glenna Oneill RN  
  
I canceled iron infusion for today.  
  
PSS- D2 iron will need rescheduled.  
  
Cherelle Casillas LPN  
  
  
Electronically signed by Cherelle Casillas LPN at 2023 1:57 PM EST  
  
* Telephone Encounter - Tello Ortega DO - 2023 1:17 PM ESTFormatting of 
  this note might be different from the original.  
I don't think this is related to the iron infusions. He may have some sort of 
acute viral infectionso I would encourage her to take him to urgent care or talk
 to PCPs office about an urgent visit.  
  
Tello Ortega DO  
Electronically signed by Tello Ortega DO at 2023 1:27 PM EST  
  
* Telephone Encounter - Glenna Oneill RN - 2023 9:57 AM ESTFormatting of 
  this note might be different from the original.  
Spoke to daughter. Daughter stated on Saturday, patient had c/o diarrhea and 
 extreme fatigue and didn't feel like doing anything.  Yesterday his energy was 
a little bit better, patient was able to get up and move around. Daughter stated
 patients appetite is decreased but she was able to get him toeat chili and 
potato salad yesterday. Yesterday around 3:00 pm, patient fell when he was 
opening his front door to let his dog out. Per daughter, patient has bad 
neuropathy in his feet; left foot is worse,  he drags  his left foot. Daughter 
denies patient hitting his head or dizziness or visual changes related to the 
fall. Today, patients temperature is 99 F, he has chills, and at 6:00 am, 
patient had nausea and 1 episode of  green  emesis. Daughter denies headaches or
 cold symptoms. Daughter denies known sick contacts. Daughter aware this nurse 
will provide Dr. Ortega with an update and will call back with further 
instructions. Daughter stated understanding.  
  
Glenna Oneill RN  
  
Electronically signed by Glenna Oneill RN at 2023 10:10 AM EST  
  
* Telephone Encounter - Awa Olivas - 2023 9:13 AM ESTFormatting of this
   note might be different from the original.  
Patient received iron infusion 23. His daughter calls in stating that the 
patient has been experiencing severe fatigue, nausea, and a slightly elevated 
temperature. Daughter is requesting to be contacted to discuss plan of care. He 
is scheduled to receive another iron treatment today.  
Electronically signed by Awa Olivas at 2023 9:15 AM EST  
  
documented in this encounterK[facility]- Miscellaneous Notes* 
  Telephone Encounter - Glenna Oneill RN - 2023 10:34 AM ESTFormatting of 
  this note might be different from the original.  
Addressed in separate phone encounter.  
  
Glenna Oneill RN  
  
Electronically signed by Glenna Oneill RN at 2023 10:34 AM EST  
  
* Telephone Encounter - Cherelle Casillas LPN - 2023 9:17 AM ESTFormatting of
   this note might be different from the original.  
See phone note from 2023.  
  
Cherelle Casillas LPN  
  
Electronically signed by Cherelle Casillas LPN at 2023 9:17 AM EST  
  
documented in this encounterK[facility]- [facility]- [facility]* Patient Instructions*   
  
Júnior Ahuja MD - 2023 3:42 PM EST  
  
Formatting of this note might be different from the original.  
DO NOT TAKE FUROSEMIDE WHILE TAKING TORSEMIDE.  
  
START TORSEMIDE TOMORROW.  
  
START ANTIBIOTIC TONIGHT.  
Electronically signed by Júnior Ahuja MD at 2023 3:42 PM EST  
  
  
  
documented in this encounterK[facility]01- History of Present 
illness Narrative* Júnior Ahuja MD - 2023 3:31 PM ESTFormatting of 
  this note is different from the original.  
This note was created using [employer].  
Subjective  
Mitesh Braun is a 83 year old male here with daughter. He developed an itchy
 rash of both legs one week ago and he was applying capsaicin and other creams 
with no improvement.  
  
His blood pressure continued to be low, so most medications were discontinued 
other than sfetepfcoy24 mg every other day. His urinary incontinence was 
progressing, and aside from urge incontinence, he had incontinence without 
awareness.  
  
Review of Systems  
Constitutional: Negative for chills and fever.  
Respiratory: Negative for shortness of breath.  
Cardiovascular: Positive for leg swelling. Negative for chest pain and 
palpitations.  
Genitourinary: Positive for urgency.  
Incontinence.  
  
ACTIVE PROBLEM LIST  
Essential hypertension  
Caren On Cpap  
Esophageal Reflux  
Gout  
Mixed hyperlipidemia  
Impaired Fasting Glucose  
Aortic Valve Disorder  
Polycystic Kidney  
Adenomatous Colon Polyp  
Thrombocytopenia (HCC)  
Cad (Coronary Artery Disease), Native Coronary Artery  
Adjustment Disorder With Depressed Mood  
Asthma Exacerbation  
Peripheral Polyneuropathy  
Splenomegaly  
Ckd (Chronic Kidney Disease) Stage 3, Gfr 30-59 Ml/Min (Hcc)  
Abnormality of Gait  
Anemia of Chronic Disease  
Urge Incontinence of Urine  
Obesity, Class II, Bmi 35-39.9  
Frequent Falls  
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  
Iron Malabsorption  
Liver Lesion  
  
Current Outpatient Medications  
Medication Sig  
furosemide (LASIX) 20 mg tablet TAKE 1 TABLET BY MOUTH EVERY OTHER RDAY  
ascorbic acid, vitamin C, (VITAMIN C) 500 mg tablet Take 1 tablet by mouth once 
daily.  
LORazepam (ATIVAN) 1 mg tablet Take 1 tablet by mouth 1 hour before MRI  
ascorbic acid-elderberry fruit 100-50 mg chew Take 2 tablets by mouth once 
daily.  
cyanocobalamin (VITAMIN B-12) 2,500 mcg tablet Take 2,500 mcg by mouth once 
daily.  
melatonin 10 mg tab Take 1 tablet by mouth as needed.  
aspirin, enteric coated (ASPIRIN, ENTERIC COATED) 81 mg EC tablet Take 81 mg by 
mouth once daily.  
albuterol HFA (PROVENTIL HFA, VENTOLIN HFA) 90 mcg/actuation inhaler Inhale 2 
Puffs as instructed every 4 hours as needed for wheezing/shortness of breath.  
omeprazole (PRILOSEC) 40 mg capsule Take 1 capsule by mouth once daily.  
allopurinol (ZYLOPRIM) 300 mg tablet Take 1 tablet by mouth once daily.  
magnesium oxide 400 mg magnesium cap Take 1 capsule by mouth once daily.  
fenofibrate nanocrystallized (TRICOR) 48 mg tablet Take 1 tablet by mouth once 
daily.  
sertraline (ZOLOFT) 50 mg tablet Take 1 tablet by mouth once daily.  
nitroglycerin sublingual (NITROQUICK) 0.4 mg SL tablet Dissolve 1 tablet under 
the tongue every 5 minutes as needed for chest pain. FOR CHEST PAIN. IF NO 
RELIEF CALL 911  
fluticasone (FLONASE) 50 mcg/actuation nasal spray Use 1 Spray in each nostril 
once daily.  
ferrous sulfate 325 mg (65 mg iron) EC tablet Take 1 tablet by mouth twice daily
 with meals. (Patient taking differently: Take 325 mg by mouth once daily.)  
oxybutynin ER (DITROPAN XL) 15 mg 24 hr Extended Rel Tab Take 2 tablets by mouth
 once daily.  
amoxicillin (POLYMOX, AMOXIL) 500 mg capsule Take four tablets, one hour prior 
to dental work (Patient taking differently: Take four tablets by mouth , one 
hour prior to dental work)  
fluticasone-vilanterol (BREO ELLIPTA) 200-25 mcg/dose inhaler Inhale 1 
Inhalation as instructed once daily. Inhale one puff once daily. DO NOT CLICK 
OPEN UNTIL READY FOR DOSE  
multivitamin tablet Take 1 tablet by mouth once daily.  
torsemide (DEMADEX) 20 mg tablet Take 1 tablet by mouth once daily for 5 days.  
doxycycline (VIBRA-TABS) 100 mg tablet Take 1 tablet by mouth twice daily for 7 
days.  
Diaper,Brief, Adult,Disposable (DEPEND EASY FIT UNDERGARMENTS) 1 Each three 
times daily.  
  
No current facility-administered medications for this visit.  
  
  
Objective  
/64 (BP Site: Left Arm, BP Position: Sitting, BP Cuff Size: Large Adult)  
 Pulse 76   Temp 36.2 C (97.2 F) (Temporal)   Resp 16   Wt 111.6 kg (246 lb)   
BMI 38.53 kg/m  
Physical Exam  
Constitutional:  
General: He is not in acute distress.  
Appearance: He is not ill-appearing.  
Cardiovascular:  
Rate and Rhythm: Normal rate and regular rhythm.  
Heart sounds: No murmur heard.  
Pulmonary:  
Effort: No respiratory distress.  
Breath sounds: No wheezing or rales.  
Musculoskeletal:  
General: No tenderness.  
Right lower leg: 3+ Edema present.  
Left lower le+ Edema present.  
Skin:  
Findings: Erythema and rash present.  
Comments: Linear excoriations with erythema on both lower legs, no seepage, no 
ulcers, no drainage,some warmth, right more than left.  
Neurological:  
Mental Status: He is alert.  
Gait: Gait abnormal.  
  
Assessment and Plan  
  
1. Stasis dermatitis of both legs - ICD9: 454.1, ICD10: I87.2 (primary 
diagnosis)  
- TORSEMIDE 20 MG TABLET  
  
2. Local skin infection - ICD9: 686.9, ICD10: L08.9  
- Begin treatment with  
- DOXYCYCLINE HYCLATE 100 MG TABLET  
  
3. Urge incontinence of urine - ICD9: 788.31, ICD10: N39.41  
- DEPEND EASY FIT UNDERGARMENTS MISC  
  
4. Peripheral polyneuropathy - ICD9: 356.9, ICD10: G62.9  
- DEPEND EASY FIT UNDERGARMENTS MISC  
  
Further recommendations will depend on response by next week.  
  
Júnior Ahuja MD  
  
Electronically signed by Júnior Ahuja MD at 2023 4:59 PM EST  
  
documented in this encounterK[facility]- [facility]01- [facility] of Present illness Narrative* Nathalia Bernal PA-C - 
  2023 1:38 PM ESTFormatting of this note is different from the original.  
HISTORY AND PHYSICAL  
  
Mitesh ARAUZ Kade  
1940  
  
REFERRING PHYSICIAN: Tello Ortega DO  
  
CHIEF COMPLAINT: Consult (colonoscopy)  
  
HPI: The patient is a 83 year old male referred for endoscopy. Mitesh notes 
iron deficiency anemiaand was referred by hem/onc for endoscopic evaluation of 
possible GI source of bleeding. Patient denies any change in bowel habits, 
weight changes, blood in stools, black tarry stools or abdominal pain. Denies 
family history of colon issues. The patient NOTES acid reflux.  
  
Mitesh has undergone prior endoscopy. Last EGD was in 2020 by Dr. Sandoval with findings ofgastritis, esophagitis, hiatal hernia. Last colonoscopy 
18 by Dr. Rios with removal of multiple adenomatous polyps, three year 
follow-up recommended.  
  
Patient's past medical history is significant for cirrhosis for which he follows
 with Gastroenterology. Notes mention possibility of esophageal varices which 
may require banding.  
  
PAST MEDICAL HISTORY  
Diagnosis Date  
Adenomatous colon polyp 8/3/2011  
Adjustment disorder with depressed mood 3/9/2012  
Aortic valve disorders 2007  
stenosis  
Asthma exacerbation 11/10/2012  
Calculus of ureter 2005  
Esophageal reflux 10/14/2011  
Essential hypertension 10/14/2005  
Impaired renal function 4/15/2010  
Impotence of organic origin 2006  
Intestinal polyp 8/3/2011  
Nonspecific abnormal results of liver function study 2006  
Obesity, unspecified  
CAREN on CPAP 10/13/2005  
Other abnormal blood chemistry  
Hyperuricemia  
Other and unspecified hyperlipidemia 10/14/2005  
Peripheral polyneuropathy 2/3/2015  
Polycystic kidney 2010  
Snoring  
Splenomegaly 9/10/2014  
Thrombocytopenia (HCC) 10/5/2011  
  
  
PAST SURGICAL HISTORY  
Procedure Laterality Date  
ARTHRP KNE CONDYLE&PLATU MEDIAL&LAT COMPARTMENTS Left 2017  
BAL. ASSIST ENTEROSCOPY W/ BX 2011  
CAPSULE ENDO SMALL BOWEL W EGD 2011  
COLONOSCOPY FLX DX W/COLLJ SPEC WHEN PFRMD   
Colonoscopy  
COLONOSCOPY FLX DX W/COLLJ SPEC WHEN PFRMD   
Colonoscopy  
COLONOSCOPY FLX DX W/COLLJ SPEC WHEN PFRMD 2011  
Colonoscopy  
COLONOSCOPY FLX DX W/COLLJ SPEC WHEN PFRMD 2018  
Colonoscopy  
COLONOSCOPY W/BIOPSY SINGLE/MULTIPLE 2008  
ESOPHAGOGASTRODUODENOSCOPY TRANSORAL DIAGNOSTIC 2018  
EGD  
ESOPHAGOGASTRODUODENOSCOPY TRANSORAL DIAGNOSTIC 2019  
EGD  
ESOPHAGOGASTRODUODENOSCOPY TRANSORAL DIAGNOSTIC 2020  
EGD  
EXCISION PILONIDAL CYST/SINUS SIMPLE 1964  
HEMORRHOIDECTOMY INTERNAL RUBBER BAND LIGATIONS 2018  
824 and 18  
OPTX FEM SHFT FX W/INSJ IMED IMPLT W/WO SCREW Left 2020  
L hip cephalo medullary nail.  
RPLCMT AORTIC VALVE OPN W/STENTLESS TISSUE VALVE 10/04/2011  
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  
RPR UMBILICAL HRNA 5 YRS/> REDUCIBLE 09/10/2010  
Hernia repair, umbilical >5yr  
  
Current Outpatient Medications  
Medication Sig  
furosemide (LASIX) 20 mg tablet TAKE 1 TABLET BY MOUTH EVERY OTHER RDAY  
atenolol (TENORMIN) 50 mg tablet Take 1 tablet by mouth once daily.  
ascorbic acid, vitamin C, (VITAMIN C) 500 mg tablet Take 1 tablet by mouth once 
daily.  
ascorbic acid-elderberry fruit 100-50 mg chew Take 2 tablets by mouth once 
daily.  
cyanocobalamin (VITAMIN B-12) 2,500 mcg tablet Take 2,500 mcg by mouth once 
daily.  
melatonin 10 mg tab Take 1 tablet by mouth as needed.  
aspirin, enteric coated (ASPIRIN, ENTERIC COATED) 81 mg EC tablet Take 81 mg by 
mouth once daily.  
albuterol HFA (PROVENTIL HFA, VENTOLIN HFA) 90 mcg/actuation inhaler Inhale 2 
Puffs as instructed every 4 hours as needed for wheezing/shortness of breath.  
omeprazole (PRILOSEC) 40 mg capsule Take 1 capsule by mouth once daily.  
allopurinol (ZYLOPRIM) 300 mg tablet Take 1 tablet by mouth once daily.  
magnesium oxide 400 mg magnesium cap Take 1 capsule by mouth once daily.  
fenofibrate nanocrystallized (TRICOR) 48 mg tablet Take 1 tablet by mouth once 
daily.  
sertraline (ZOLOFT) 50 mg tablet Take 1 tablet by mouth once daily.  
nitroglycerin sublingual (NITROQUICK) 0.4 mg SL tablet Dissolve 1 tablet under 
the tongue every 5 minutes as needed for chest pain. FOR CHEST PAIN. IF NO 
RELIEF CALL 911  
fluticasone (FLONASE) 50 mcg/actuation nasal spray Use 1 Spray in each nostril 
once daily.  
ferrous sulfate 325 mg (65 mg iron) EC tablet Take 1 tablet by mouth twice daily
 with meals. (Patient taking differently: Take 325 mg by mouth once daily.)  
oxybutynin ER (DITROPAN XL) 15 mg 24 hr Extended Rel Tab Take 2 tablets by mouth
 once daily.  
amoxicillin (POLYMOX, AMOXIL) 500 mg capsule Take four tablets, one hour prior 
to dental work (Patient taking differently: Take four tablets by mouth , one 
hour prior to dental work)  
betamethasone valerate 0.1 % lotion Apply to affected area twice daily.  
fluticasone-vilanterol (BREO ELLIPTA) 200-25 mcg/dose inhaler Inhale 1 
Inhalation as instructed once daily. Inhale one puff once daily. DO NOT CLICK 
OPEN UNTIL READY FOR DOSE  
multivitamin tablet Take 1 tablet by mouth once daily.  
lisinopril (ZESTRIL, PRINIVIL) 40 mg tablet Take 40 mg by mouth once daily. 
(Patient not taking: Reported on 2023)  
  
No current facility-administered medications for this visit.  
  
ALLERGIES: Bees, Acetaminophen, Hydrocodone, Lipitor [Atorvastatin Calcium], 
Singulair [MontelukastSodium], and Strawberries  
  
PERSONAL HISTORY:  
Social History  
  
Tobacco Use  
Smoking status: Never  
Smokeless tobacco: Never  
Vaping Use  
Vaping Use: Never used  
Substance Use Topics  
Alcohol use: No  
Drug use: No  
  
  
  
FAMILY HISTORY:  
FAMILY HISTORY  
Problem Relation Age of Onset  
Heart Mother  
 in 70's  
Coronary Artery Disease Father  
 in his 80's  
Emphysema Father  
Diabetes Brother  
  
REVIEW OF SYMPTOMS:  
The review of systems data was entered by the nurse and reviewed by me  
  
Nursing Notes:  
Karina Cabrera RN 2023 1:19 PM Signed  
REVIEW OF SYSTEMS:  
General: The patient NOTES fatigue, denies weight loss, denies weight gain, 
denies feeling hot, andNOTES feelings of cold.  
Eyes: The patient denies glaucoma, NOTES eye injury/surgery, wears glasses or 
contacts.  
Ear/Nose/Throat: The patient NOTES allergies, denies hayfever, denies ear 
infections, and denies bloody noses.  
Cardiovascular: The patient denies chest pain, denies heart disease, NOTES high 
blood pressure,denies cardiac stent, denies prior heart attack, denies irregular
heart beat, NOTES high cholesterol, denies poor circulation, NOTESheart failure,
other cardiac issues, denies claudication, denies cold feet, denies peripheral 
arterial stent.  
Respiratory: The patient denies tuberculosis, denies pneumonia, denies frequent 
cough, denies pulmonary embolism, NOTES shortness of breath, and denies coughing
up blood.  
Gastrointestinal: The patient denies difficulty swallowing, NOTES acid reflux, 
denies ulcers, denies vomiting, denies jaundice/hepatitis, denies gallbladder 
problems, denies black or tarry stools, denies hemorrhoids, denies bleeding from
rectum, NOTES diverticulitis, denies constipation, denies diarrhea, denies loss 
of stool control, and denies hernias.  
Kidney/Bladder: The patient denies kidney stones, denies urine infections, and 
denies bloody urine.  
Skin: The patient denies a history of skin cancer, denies bleeding/changing 
moles, and denies a history of skin rash.  
Neurologic: The patient denies a history of epilepsy/convulsions, denies 
headaches, denies head/spinal injuries, and denies stroke/TIA.  
Psychiatric: The patient denies psychiatric medications, denies depression, and 
denies voices, denies substance abuse.  
Endocrine: The patient denies thyroid disorders, denies diabetes, and denies 
hormonal problems.  
Hematologic: The patient denies a history of bruising, denies bleeding, and 
denies anemia, denies blood clots.  
Infections: The patient denies a history of measles and mumps, denies rheumatic 
fever, and denies sexually transmitted diseases.  
Musculoskeletal: The patient denies back pain/injury, denies back problems, 
denies sciatica, NOTES knee/foot trouble, NOTES arthritis, or denies gout.  
  
Last colon   
  
Karina Cabrera RN  
I have confirmed and edited as necessary, the PFSH and ROS obtained by others  
Nathalia Bernal PA-C  
  
  
PHYSICAL EXAMINATION:  
  
General: The patient is 83 year old male, well nourished, well hydrated in no 
acute distress. The patient is oriented to time, place, and person.  
  
VITALS: Blood pressure 140/80, pulse 110, temperature 37.2 C (98.9 F), height 
170.2 cm (5' 7 ), weight 114.3 kg (252 lb), SpO2 94 %. Body mass index is 39.47 
kg/m .  
  
HEENT: Normal cephalic, ataumatic, pupils are equally round, sclera are 
anicteric, mucous membranesare moist, oropharynx is clear. Neck has no masses, 
asymmetry or lymphadenopathy.  
  
Respiratory: Clear to auscultation and percussion. Normal respiratory excursion 
and pattern.  
  
Cardiac: Examination is regular rate and rhythm. Normal S1/S2  
  
Abdominal exam: Soft, nontender, with no palpable masses. No hepatosplenomegaly.
No palpable hernias.  
  
Extremities: no clubbing, cyanosis or edema. No adenopathy.  
  
LABORATORY VALUES: As Noted  
  
RADIOLOGIC STUDIES: As Noted  
  
Assessment  
IMPRESSION: history of colon polyps, anemia, cirrhosis  
  
PLAN: I have reviewed my findings with the surgeon. Patient referred for upper 
and lower endoscopy for evaluation of possible GI bleeding. Patient would 
require MAC due to medical comorbidities. Due to the possibility of esophageal 
varices which may require banding ([facility]), would recommend that patient have his upper and lower endoscopy 
performed by a gastroenterologist who could perform banding at time of endoscopy
if needed based on findings. This was relayed to the patient and his daughter, 
who verbalized understanding.  
  
  
Diagnoses: (D50.9) Iron deficiency anemia, unspecified iron deficiency anemia 
type (primary encounter diagnosis)  
(Z86.010) History of colonic polyps  
(Z87.19) History of cirrhosis  
  
Consultation requested by Dr. Ortega for an opinion regarding anemia. My final 
recommendations will be communicated back to the requesting physician by way of 
shared Medical record or letter to requesting physician via US mail.  
  
_____________________________  
Nathalia Bernal PA-C  
  
Electronically signed by Nathalia Bernal PA-C at 2023 8:16 AM EST  
  
documented in this encounterK[facility]- [facility] Note* Karina Cabrera RN - 2023 1:16 PM ESTFormatting of this note might be 
  different from the original.  
REVIEW OF SYSTEMS:  
General: The patient NOTES fatigue, denies weight loss, denies weight gain, 
denies feeling hot, andNOTES feelings of cold.  
Eyes: The patient denies glaucoma, NOTES eye injury/surgery, wears glasses or 
contacts.  
Ear/Nose/Throat: The patient NOTES allergies, denies hayfever, denies ear 
infections, and denies bloody noses.  
Cardiovascular: The patient denies chest pain, denies heart disease, NOTES high 
blood pressure,denies cardiac stent, denies prior heart attack, denies irregular
heart beat, NOTES high cholesterol, denies poor circulation, NOTESheart failure,
other cardiac issues, denies claudication, denies cold feet, denies peripheral 
arterial stent.  
Respiratory: The patient denies tuberculosis, denies pneumonia, denies frequent 
cough, denies pulmonary embolism, NOTES shortness of breath, and denies coughing
up blood.  
Gastrointestinal: The patient denies difficulty swallowing, NOTES acid reflux, 
denies ulcers, denies vomiting, denies jaundice/hepatitis, denies gallbladder 
problems, denies black or tarry stools, denies hemorrhoids, denies bleeding from
rectum, NOTES diverticulitis, denies constipation, denies diarrhea, denies loss 
of stool control, and denies hernias.  
Kidney/Bladder: The patient denies kidney stones, denies urine infections, and 
denies bloody urine.  
Skin: The patient denies a history of skin cancer, denies bleeding/changing 
moles, and denies a history of skin rash.  
Neurologic: The patient denies a history of epilepsy/convulsions, denies 
headaches, denies head/spinal injuries, and denies stroke/TIA.  
Psychiatric: The patient denies psychiatric medications, denies depression, and 
denies voices, denies substance abuse.  
Endocrine: The patient denies thyroid disorders, denies diabetes, and denies 
hormonal problems.  
Hematologic: The patient denies a history of bruising, denies bleeding, and 
denies anemia, denies blood clots.  
Infections: The patient denies a history of measles and mumps, denies rheumatic 
fever, and denies sexually transmitted diseases.  
Musculoskeletal: The patient denies back pain/injury, denies back problems, 
denies sciatica, NOTES knee/foot trouble, NOTES arthritis, or denies gout.  
  
Last colon 2019  
  
Karina Cabrera RN  
Electronically signed by Karina Cabrera RN at 2023 1:19 PM EST  
  
documented in this encounterK[facility]- Miscellaneous Notes* 
  Telephone Encounter - [facility] - 01/15/2023 2:42 PM ESTFormatting of 
  this note might be different from the original.  
Unable to reach patient's daughter Dixie, who's number is in chart to call, just
rings and no  or voicemail. We can try again at a later time. Also 
patient is scheduled to be in the office later this week 2023, unable to 
determine based on the note below and patient's appointment desk what things 
have been schedule and what still needs scheduled.  
  
Zofia Altamirano Pss  
  
Electronically signed by Zofia Altamirano Pss at 01/15/2023 2:45 PM EST  
  
* Telephone Encounter - Marilin Sanchez - 2023 4:58 PM ESTSummary: AVS 
  23  
  
Formatting of this note is different from the original.  
Check out comments: Labs today.  
MRI Liver when able--patient would like done at [facility]--open MRI.  
Referral to Dr. Peabody for EGD/colonoscopy--possible varices.  
CT guided bone marrow biopsy at [facility] when able. Hold ASA 1 week prior.  
Referral to hepatology for cirrhosis.  
Schedule for 10 doses iron sucrose.  
CBC/CMP/Myeloma labs then OV after completes iron sucrose.  
  
Electronically signed by Marilin Sanchez at 2023 4:59 PM EST  
  
documented in this encounterK[facility]01- [facility]- [facility] of Present illness Narrative* Maurizio Cochran PA-C - 
  2023 10:20 AM ESTFormatting of this note is different from the original.  
VIRTUAL VISIT FOLLOW UP  
  
I had a virtual visit with Mr. Braun today for follow up of cirrhosis.  
  
UPDATED HISTORY:  
CC: Patient presents with:  
Cirrhosis  
  
HPI: Mr. Braun is a 83 year old with past medical history of colon polyps, 
diverticulosis, internal hemorrhoids gastric ulcer , Esophageal reflux, obesity,
HLP, aortic valve disorder, polycystic kidney, CAREN on CPAP, thrombocytopenia, 
splenomegaly, hypertension, presenting today for evaluation of cirrhosis.  
Patient was last seen by GI CNP Radha Kong 2021.  
Here at request of Dr. Tello Ortega  
Daughter present during the visit  
  
At last visit:  
Upon chart review, had an abdominal CT in  with findings of fatty liver but 
no splenomegaly  
Most recent RUQ US showed a nodular contour of the liver with splenomegaly and a
new hyperechoic nodule in the right lobe of the liver  
An MRI liver w/wo cont has been ordered but not scheduled  
Last INR 2022 was wnl 1.1  
He's had thrombocytopenia since   
Per Dr. Ortega:  
He also has macrocytic anemia and thrombocytopenia. Prior reports of 
splenomegaly. I personally reviewed CT images from  and . In 2014, 
spleen measured up to about 16 cm in craniocaudal dimension. No mention of fatty
liver or other morphologic changes of the liver to suggest cirrhosis.  
  
Interval hx:  
Believes he was told he had liver lesions previously, which were benign.  
He has never been told he has a fatty liver or cirrhosis until recent testing  
May be going for bone marrow biopsy  
Has never had a liver biopsy  
Does fit the metabolic syndrome  
No alcohol use now, never a big drinker  
Has leg swelling, usually progressive as the day goes on, better upon waking, 
sometimes left swellsmore than right  
Eating better now with meals on wheels, likes soup and salads  
Daughter inquiring if he could have hepatic encephalopathy, has memory issues  
  
Risk Factors for Liver Disease:  
1. Blood transfusions before : yes  
2. IVDA: No  
3. Intranasal coccaine use: No  
4. Tattoos: No  
5.  Service: yes, [employer] (received vaccines)  
6. High risk sexual behavior: No  
7. Alcohol: none in older age, only beer in younger years-occasionally socially  
8. Obesity: yes  
9. Hyperlipidemia: yes  
10. Prolonged exposure to hepatotoxic meds: No  
11. Other autoimmune disorders No  
No daily tylenol  
OTC: multivitamin, elderberry gummies, vitamin c, vitamin b12  
  
Metabolic Syndrome Risk factors:   
1) Diabetes/ Abnormal FBS >100mg/dL:yes  
2) Hypertension : yes  
3)Triglycerides more then 150 : yes  
4) HDL (<50 female and <40 male): yes  
5) Central obesity ( Waist >102 men and >88 female) - Body mass index is 38.89 
kg/(m^2).  
Weight is relatively stable, lost weight around 60 pounds when wife passed away  
Appetite is better, on meals on wheels  
  
Complications of Cirrhosis:  
1. Ascites: No  
2. SBP: No  
3. Non-bleeding varices: No  
4. Variceal hemorrage: No  
5. Portosystemic encephalopathy: No  
6. Hepatorenal Syndrome: No  
7. Hepatopulmonary Syndrome: No  
8. Hepatic hydrothorax: No  
9. Recurrent Cholangitis (PSC): No  
  
Family hx: no cirrhosis, liver cancer  
Surgical hx: hip replacement  
  
Denies current issues with ascites, HE, hematemesis, hematochezia, confusion, 
dark urine, josselyn colored stool, nausea, vomiting, weight loss, early satiety, 
bloating, dysphagia, odynophagia, change inbm. Remaining systems reviewed and 
are negative.  
  
MELD-Na score: 7 at 2022 4:21 PM  
MELD score: 7 at 2022 4:21 PM  
Calculated from:  
Serum Creatinine: 1.01 mg/dL at 2022 4:21 PM  
Serum Sodium: 135 mmol/L at 2022 4:21 PM  
Total Bilirubin: 0.5 mg/dL (Using min of 1 mg/dL) at 2022 4:21 PM  
INR(ratio): 1.1 at 2022 4:21 PM  
Age: 82 years  
  
PAST MEDICAL HISTORY  
Diagnosis Date  
Adenomatous colon polyp 8/3/2011  
Adjustment disorder with depressed mood 3/9/2012  
Aortic valve disorders 2007  
stenosis  
Asthma exacerbation 11/10/2012  
Calculus of ureter 2005  
Esophageal reflux 10/14/2011  
Essential hypertension 10/14/2005  
Impaired renal function 4/15/2010  
Impotence of organic origin 2006  
Intestinal polyp 8/3/2011  
Nonspecific abnormal results of liver function study 2006  
Obesity, unspecified  
CAREN on CPAP 10/13/2005  
Other abnormal blood chemistry  
Hyperuricemia  
Other and unspecified hyperlipidemia 10/14/2005  
Peripheral polyneuropathy 2/3/2015  
Polycystic kidney 2010  
Snoring  
Splenomegaly 9/10/2014  
Thrombocytopenia (HCC) 10/5/2011  
  
PAST SURGICAL HISTORY  
Procedure Laterality Date  
ARTHRP KNE CONDYLE&PLATU MEDIAL&LAT COMPARTMENTS Left 2017  
BAL. ASSIST ENTEROSCOPY W/ BX 2011  
CAPSULE ENDO SMALL BOWEL W EGD 2011  
COLONOSCOPY FLX DX W/COLLJ SPEC WHEN PFRMD   
Colonoscopy  
COLONOSCOPY FLX DX W/COLLJ SPEC WHEN PFRMD   
Colonoscopy  
COLONOSCOPY FLX DX W/COLLJ SPEC WHEN PFRMD 2011  
Colonoscopy  
COLONOSCOPY FLX DX W/COLLJ SPEC WHEN PFRMD 2018  
Colonoscopy  
COLONOSCOPY W/BIOPSY SINGLE/MULTIPLE 2008  
ESOPHAGOGASTRODUODENOSCOPY TRANSORAL DIAGNOSTIC 2018  
EGD  
ESOPHAGOGASTRODUODENOSCOPY TRANSORAL DIAGNOSTIC 2019  
EGD  
ESOPHAGOGASTRODUODENOSCOPY TRANSORAL DIAGNOSTIC 2020  
EGD  
EXCISION PILONIDAL CYST/SINUS SIMPLE 1964  
HEMORRHOIDECTOMY INTERNAL RUBBER BAND LIGATIONS 2018 and 18  
OPTX FEM SHFT FX W/INSJ IMED IMPLT W/WO SCREW Left 2020  
L hip cephalo medullary nail.  
RPLCMT AORTIC VALVE OPN W/STENTLESS TISSUE VALVE 10/04/2011  
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  
RPR UMBILICAL HRNA 5 YRS/> REDUCIBLE 09/10/2010  
Hernia repair, umbilical >5yr  
  
FAMILY HISTORY  
Problem Relation Age of Onset  
Heart Mother  
 in 70's  
Coronary Artery Disease Father  
 in his 80's  
Emphysema Father  
Diabetes Brother  
  
Social History  
  
Tobacco Use  
Smoking status: Never  
Smokeless tobacco: Never  
Vaping Use  
Vaping Use: Never used  
Substance Use Topics  
Alcohol use: No  
Drug use: No  
  
Current Outpatient Medications  
Medication Sig Dispense Refill  
iv contrast (will be provided with radiology test) MRI Liver Inject, 
intravenously, once for 1 dose. No IV access, insert saline lock prior to the 
beginning of sedation, infusion, injection of imaging exam. Discontinue saline 
lock post exam. If Pt. has a central line or IVAD, may access for administration
according to line specific nursing protocol. Once exam is complete flush line 
and de-access according to line specific nursing protocol in the MR contrast 
administration guidelines link. 1 Each 0  
ascorbic acid-elderberry fruit 100-50 mg chew Take 2 tablets by mouth once 
daily.  
cyanocobalamin (VITAMIN B-12) 2,500 mcg tablet Take 2,500 mcg by mouth once 
daily.  
melatonin 10 mg tab Take 1 tablet by mouth as needed.  
aspirin, enteric coated (ASPIRIN, ENTERIC COATED) 81 mg EC tablet Take 81 mg by 
mouth once daily.  
albuterol HFA (PROVENTIL HFA, VENTOLIN HFA) 90 mcg/actuation inhaler Inhale 2 
Puffs as instructed every 4 hours as needed for wheezing/shortness of breath.  
omeprazole (PRILOSEC) 40 mg capsule Take 1 capsule by mouth once daily. 90 
capsule 3  
allopurinol (ZYLOPRIM) 300 mg tablet Take 1 tablet by mouth once daily. 90 
tablet 3  
magnesium oxide 400 mg magnesium cap Take 1 capsule by mouth once daily. 90 
capsule 3  
fenofibrate nanocrystallized (TRICOR) 48 mg tablet Take 1 tablet by mouth once 
daily. 90 tablet 3  
sertraline (ZOLOFT) 50 mg tablet Take 1 tablet by mouth once daily. 90 tablet 3  
nitroglycerin sublingual (NITROQUICK) 0.4 mg SL tablet Dissolve 1 tablet under 
the tongue every 5 minutes as needed for chest pain. FOR CHEST PAIN. IF NO 
RELIEF CALL 911 25 tablet 1  
fluticasone (FLONASE) 50 mcg/actuation nasal spray Use 1 Spray in each nostril 
once daily. 3 Each 3  
ferrous sulfate 325 mg (65 mg iron) EC tablet Take 1 tablet by mouth twice daily
with meals. (Patient taking differently: Take 325 mg by mouth once daily.) 180 
tablet 1  
oxybutynin ER (DITROPAN XL) 15 mg 24 hr Extended Rel Tab Take 2 tablets by mouth
once daily. 180 tablet 3  
amoxicillin (POLYMOX, AMOXIL) 500 mg capsule Take four tablets, one hour prior 
to dental work (Patient taking differently: Take four tablets by mouth , one 
hour prior to dental work) 4 capsule 1  
betamethasone valerate 0.1 % lotion Apply to affected area twice daily. 60 mL 0  
fluticasone-vilanterol (BREO ELLIPTA) 200-25 mcg/dose inhaler Inhale 1 
Inhalation as instructed once daily. Inhale one puff once daily. DO NOT CLICK 
OPEN UNTIL READY FOR DOSE 0  
multivitamin tablet Take 1 tablet by mouth once daily. 0  
  
No current facility-administered medications for this visit.  
  
ALLERGIES  
Allergen Reactions  
Bees Anaphylaxis  
Acetaminophen Other: See Comments  
Hydrocodone GI Upset  
Lipitor [Atorvastat* Itching  
Singulair [Monteluk* Itching  
Strawberries Hives  
hives  
  
REVIEW OF SYSTEMS:  
PAIN ASSESSMENT: Negative for pain, history of chronic pain, or current 
treatment for a chronic pain condition.  
  
GENERAL: No weight loss, malaise or fevers  
RESPIRATORY: Negative for cough, hemoptysis, wheezing, COPD, dyspnea or 
shortness of breath  
CARDIOVASCULAR: Negative for chest pain, leg swelling, CHF or palpitations  
GI: No nausea, vomiting, or diarrhea  
: Not reviewed  
GYN: Not reviewed  
  
PHYSICAL FINDINGS OF NOTE:  
General - Normal, healthy, cooperative, in no acute distress  
Able to interact verbally by video conference  
Psych - ORIENTATION: normal to time place, person and situation  
Mood/Affect: AFFECT AND MOOD: Normal  
Head/Neuro - Normal size and shape Facial appearance normal  
Pulmonary - respiratory effort normal  
Cardiovascular - patient describes extremities normal and warm  
Abdominal - Not performed  
Skin - abnormal lesions not visualized  
Motor - patient seen sitting with Normal appearing strength and coordination  
  
REVIEWED ITEMS  
RUQ US 2022:  
IMPRESSION:  
1. There is a new hyperechoic nodule in the right lobe of the liver  
which could be further evaluated by MRI  
2. Nodular contour of the liver which may be due to cirrhosis  
3. Splenomegaly  
  
EGD 2020  
Findings:  
The examined jejunum was normal.  
The examined duodenum was normal.  
Localized moderate inflammation characterized by erosions, erythema  
and friability was found in the gastric antrum. Biopsies were taken  
with a cold forceps for histology.  
A medium-sized hiatal hernia was present.  
Non-severe esophagitis with no bleeding was found.  
Impression: - Normal examined jejunum.  
- Normal examined duodenum.  
- Gastritis. Biopsied.  
- Medium-sized hiatal hernia.  
- Non-severe reflux esophagitis.  
Recommendation: - Discharge patient to home.  
- Resume previous diet.  
- Continue present medications.  
- Return to physician assistant in 1 week.  
  
Colonoscopy 2018:  
Findings:  
Four sessile polyps were found in the transverse colon. The polyps  
were 6 to 9 mm in size. These polyps were removed with a cold snare.  
Resection and retrieval were complete. Estimated blood loss was  
minimal. To prevent bleeding post-intervention, two hemostatic clips  
were successfully placed.  
Impression: - Four 6 to 9 mm polyps in the transverse colon,  
removed with a cold snare. Resected and retrieved.  
Clips were placed.  
Recommendation: - Discharge patient to home.  
- Written discharge instructions were provided to  
the patient.  
- Resume previous diet.  
- Await pathology results.  
- Repeat colonoscopy in 3 years for surveillance.  
- Patient has a contact number available for  
emergencies. The signs and symptoms of potential  
delayed complications were discussed with the  
patient. Return to normal activities tomorrow.  
Written discharge instructions were provided to the  
patient.  
  
IMPRESSION  
Mr. Braun is a 83 year old with past medical history of colon polyps, 
diverticulosis, internal hemorrhoids gastric ulcer , Esophageal reflux, obesity,
 HLP, aortic valve disorder, polycystic kidney, CAREN on CPAP, thrombocytopenia, 
splenomegaly, hypertension, presenting today for evaluation of cirrhosis. Noted 
to have a cirrhotic appearing liver on recent RUQ US, has had thrombocytopenia 
for years,will be undergoing bone marrow biopsy. He fits the metabolic syndrome,
 placing him at risk for CÁRDENAS/NAFLD with progression to cirrhosis. Discussed 
definitive diagnosis involves a transjugular liver biopsy but he agreed to defer
 such an invasive procedure. Will monitor him as if he has cirrhosis so as to 
stay on top of potential complications, of which he has none at present, leg 
swelling is likely cardiac in nature. Discussed pathophysiology of cirrhosis 
along with signs of decompensation.  
  
RECOMMENDATION:  
MELD labs with chronic liver dx panel  
Liver lesion/HCC Screen: MRI liver w/wo cont now (may need general anesthesia at
 [facility] vs lorazepam 1 hour before MRI, will prescribe if needed, once 
scheduled). Discussed HCC Screening needed every 6 months  
Portal HTN/EV screen: EGD now, has upcoming apt to discuss EGD/colonoscopy, will
 let me know results of that visit and I will prescribe EGD if needed, may need 
variceal banding  
HE: none, discussed trial of lactulose for goal of ~3bm daily, will let me know 
if interested  
LE edema/ Ascites: none, diuretics as prescribed. Discussed <2,000 mg sodium 
diet  
Continue abstinence from alcohol, no issue for him  
Screening colonoscopy: to be discussed at upcoming visit  
HCM/Immunizations: check immunity for HAV/HBV And vaccine if needed  
Metabolic syndrome: tight control essential, to be monitored/treated by PCP. 
Weight loss 5-10% bodyweight helpful. Mediterranean diet/increased 
cardiovascular exercise with goal >45 minutes 5 days weekly  
Avoid liver detox cleanse/supplements  
Not to exceed 2,000 mg tylenol daily  
  
Follow up once blood work, EGD and liver MRI are completed, can be virtual.  
  
I spent a total of 45 minutes on the date of the service which included 
preparing to see the patient, face-to-face patient care, completing clinical 
documentation, obtaining and/or reviewing separately obtained history, 
performing a medically appropriate examination, counseling and educating the pat
ient/family/caregiver, ordering medications, tests, or procedures, communicating
 with other HCPs (not separately reported), and communicating results to the 
patient/family/caregiver.  
  
Maurizio Cochran PA-C  
2023 11:17 AM  
  
  
  
Electronically signed by Maurizio Cochran PA-C at 2023 11:20 AM EST  
  
documented in this encounterK[facility]- [facility]- [facility]12- History of Present 
illness Narrative* Siria Daly RDMS - 2022 9:15 AM ESTFormatting of 
  this note might be different from the original.  
Radiology Service Progress Note  
  
PATIENT NAME: Mitesh Braun  
MRN: [id number]  
  
DATE OF SERVICE: 2022  
TIME: 10:00 AM  
PATIENT IDENTITY VERIFICATION COMPLETED USING TWO (2) IDENTIFIERS: Name and Date
 of Birth confirmedby patient verbally.  
FALL SCREENING: Has the patient had 2 falls in the last year or 1 fall with 
injury or currently using an Ambulatory Assistive Device (Walker, Cane, 
Wheelchair, Crutches, etc.)? Yes, Patient High Riskfor Falls  
What interventions were put in place to prevent falls during this visit? 
Instructed Patient to Callfor Help if Needed, Offered Assistance with 
Transfers/Clothing, Instructed Patient to Remain Seated(Not on Exam Table) Until
 Exam, and Increased Observations by Caregivers  
PATIENT GENDER DATA: Male  
PATIENT RELEVANT IMPLANT DATA REVIEWED: Not Applicable  
  
RADIOLOGY DEPARTMENT: Ultrasound  
  
PERIPHERAL IV DATA: Not applicable  
  
SIGNED BY: Siria Daly RDMS RVT  
2022 10:00 AM  
  
  
Electronically signed by Siria Daly RDMS at 2022 10:00 AM EST  
  
documented in this encounterK[facility]12- [facility]12- [facility]12- History of Present 
illness Narrative* Tello Ortega,  - 2022 3:17 PM ESTFormatting of this 
  note is different from the original.  
Patient referred by SAMIA Zurita for anemia. The impression and plan 
will be communicated by way of the shared electronic record or faxed under 
separate cover letter.  
  
HPI: The patient is an 81 yo male with a PMH significant for HTN, polycystic 
kidney disease (seeingnephrology for ~15 years), HLD, aortic repair (bovine 
valve), CAD, CAREN (CPAP), asthma, hypercalcemia, splenomegaly (noted on CT chest 
2014; spleen up to 18.4 cm in AP dimension; not enlarged on CT enterography 
) and obesity.  
  
He had lab work ordered at his nephrologist office. Protein electrophoresis of 
the urine demonstrated no evidence of monoclonal spike. Urine protein creatinine
 ratio was elevated. Chemistry panel showed a creatinine of 1.33 mg/dL. Calcium 
mildly elevated 10.6 mg/dL. Vitamin D39.7 PTH 93.4 CBC significant for a total 
white count of 4900. No differential performed. Hemoglobin 9.0 g/dL. . 
Platelet count 79,000. Protein electrophoresis of the serum revealed a M spike 
but no immunofixation was performed.  
  
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 Worse over time.
 He's never been given an answer as to why.  
  
Balance is off and he uses wheeled walker.  
  
[place]. Has home health aides for cleaning and washing 
cl[facility]. Daughter lives close by and he gets meals on wheels.  
  
Was having left posterior hip pain and got relief with Absorsene Jr.  
  
PAST MEDICAL HISTORY  
Diagnosis Date  
Adenomatous colon polyp 8/3/2011  
Adjustment disorder with depressed mood 3/9/2012  
Aortic valve disorders 2007  
stenosis  
Asthma exacerbation 11/10/2012  
Calculus of ureter 2005  
Esophageal reflux 10/14/2011  
Essential hypertension 10/14/2005  
Impaired renal function 4/15/2010  
Impotence of organic origin 2006  
Intestinal polyp 8/3/2011  
Nonspecific abnormal results of liver function study 2006  
Obesity, unspecified  
CAREN on CPAP 10/13/2005  
Other abnormal blood chemistry  
Hyperuricemia  
Other and unspecified hyperlipidemia 10/14/2005  
Peripheral polyneuropathy 2/3/2015  
Polycystic kidney 2010  
Snoring  
Splenomegaly 9/10/2014  
Thrombocytopenia (HCC) 10/5/2011  
  
PAST SURGICAL HISTORY  
Procedure Laterality Date  
ARTHRP KNE CONDYLE&PLATU MEDIAL&LAT COMPARTMENTS Left 2017  
BAL. ASSIST ENTEROSCOPY W/ BX 2011  
CAPSULE ENDO SMALL BOWEL W EGD 2011  
COLONOSCOPY FLX DX W/COLLJ SPEC WHEN PFRMD   
Colonoscopy  
COLONOSCOPY FLX DX W/COLLJ SPEC WHEN PFRMD   
Colonoscopy  
COLONOSCOPY FLX DX W/COLLJ SPEC WHEN PFRMD 2011  
Colonoscopy  
COLONOSCOPY FLX DX W/COLLJ SPEC WHEN PFRMD 2018  
Colonoscopy  
COLONOSCOPY W/BIOPSY SINGLE/MULTIPLE 2008  
ESOPHAGOGASTRODUODENOSCOPY TRANSORAL DIAGNOSTIC 2018  
EGD  
ESOPHAGOGASTRODUODENOSCOPY TRANSORAL DIAGNOSTIC 2019  
EGD  
ESOPHAGOGASTRODUODENOSCOPY TRANSORAL DIAGNOSTIC 2020  
EGD  
EXCISION PILONIDAL CYST/SINUS SIMPLE 1964  
HEMORRHOIDECTOMY INTERNAL RUBBER BAND LIGATIONS 2018 and 18  
OPTX FEM SHFT FX W/INSJ IMED IMPLT W/WO SCREW Left 2020  
L hip cephalo medullary nail.  
RPLCMT AORTIC VALVE OPN W/STENTLESS TISSUE VALVE 10/04/2011  
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  
RPR UMBILICAL HRNA 5 YRS/> REDUCIBLE 09/10/2010  
Hernia repair, umbilical >5yr  
  
ALLERGIES  
Allergen Reactions  
Bees Anaphylaxis  
Acetaminophen Other: See Comments  
Hydrocodone GI Upset  
Lipitor [Atorvastat* Itching  
Singulair [Monteluk* Itching  
Strawberries Hives  
hives  
  
Current Outpatient Medications  
Medication Sig  
ascorbic acid-elderberry fruit 100-50 mg chew Take 2 tablets by mouth once 
daily.  
cyanocobalamin (VITAMIN B-12) 2,500 mcg tablet Take 2,500 mcg by mouth once 
daily.  
melatonin 10 mg tab Take 1 tablet by mouth as needed.  
aspirin, enteric coated (ASPIRIN, ENTERIC COATED) 81 mg EC tablet Take 81 mg by 
mouth once daily.  
albuterol HFA (PROVENTIL HFA, VENTOLIN HFA) 90 mcg/actuation inhaler Inhale 2 
Puffs as instructed every 4 hours as needed for wheezing/shortness of breath.  
omeprazole (PRILOSEC) 40 mg capsule Take 1 capsule by mouth once daily.  
allopurinol (ZYLOPRIM) 300 mg tablet Take 1 tablet by mouth once daily.  
magnesium oxide 400 mg magnesium cap Take 1 capsule by mouth once daily.  
fenofibrate nanocrystallized (TRICOR) 48 mg tablet Take 1 tablet by mouth once 
daily.  
sertraline (ZOLOFT) 50 mg tablet Take 1 tablet by mouth once daily.  
nitroglycerin sublingual (NITROQUICK) 0.4 mg SL tablet Dissolve 1 tablet under 
the tongue every 5 minutes as needed for chest pain. FOR CHEST PAIN. IF NO 
RELIEF CALL 911  
fluticasone (FLONASE) 50 mcg/actuation nasal spray Use 1 Spray in each nostril 
once daily.  
ferrous sulfate 325 mg (65 mg iron) EC tablet Take 1 tablet by mouth twice daily
 with meals.  
oxybutynin ER (DITROPAN XL) 15 mg 24 hr Extended Rel Tab Take 2 tablets by mouth
 once daily.  
amoxicillin (POLYMOX, AMOXIL) 500 mg capsule Take four tablets, one hour prior 
to dental work (Patient taking differently: Take four tablets by mouth , one 
hour prior to dental work)  
fluticasone-vilanterol (BREO ELLIPTA) 200-25 mcg/dose inhaler Inhale 1 
Inhalation as instructed once daily. Inhale one puff once daily. DO NOT CLICK 
OPEN UNTIL READY FOR DOSE  
multivitamin tablet Take 1 tablet by mouth once daily.  
betamethasone valerate 0.1 % lotion Apply to affected area twice daily.  
  
No current facility-administered medications for this visit.  
  
Social History  
  
Tobacco Use  
Smoking status: Never  
Smokeless tobacco: Never  
Substance Use Topics  
Alcohol use: No  
Drug use: No  
  
ROS:  
Constitutional: Denies episodes of fever and night sweats.  
Neuro: See above. Believes he may have recently had a stroke. He has mild left 
hemiparesis.  
HEENT: No recent change in voice, vision or hearing.  
Resp: Denies cough, wheeze and hemoptysis. Denies shortness of breath at rest.  
CVS: Denies exertional chest pain, PND, orthopnea. Chronic bilateral lower 
extremity swelling.  
GI: No abdominal pain. Bowels move twice a day. Formed stools. Black from iron. 
No blood observed..  
: Denies dysuria or gross hematuria.  
Endo: Denies hot flashes. Denies polyuria and polydipsia. Denies heat and cold 
intolerance.  
Musculoskeletal: No musculoskeletal pain other than left hip pain as outlined 
above.  
Derm: Denies rash. Denies jaundice and diffuse pruritis.  
Heme: Denies unusual bleeding and unexplained bruising.  
Psych: Normal mood.  
  
PHYSICAL EXAM:  
Vitals: Blood pressure 134/81, pulse 93, temperature 36.7 C (98 F), temperature 
source Temporal, weight 110.9 kg (244 lb 8 oz).  
Well-appearing and in no acute distress.  
EYES: Sclerae are anicteric bilaterally.  
ENT: Oral mucosa is unremarkable. There is no sign of thrush or mucositis.  
LYMPHATIC: There is no palpable cervical, supraclavicular or axillary 
adenopathy.  
RESPIRATORY: Inspiratory breath sounds are of normal intensity in all fields. No
 rales, wheezes or rhonchi.  
CARDIOVASCULAR: Rhythm is regular.  
ABDOMEN: The abdomen is nondistended. No organomegaly. No tenderness.  
Extremities: Bilateral lower extremity swelling. Right somewhat worse than left.
 Overlying pitting.  
SKIN: No jaundice.  
NEUROLOGIC: CNs II-XII are grossly intact. He is not able to step up on the exam
 table on his own. Needs at least a 1 person assist. May have some mild left leg
 weakness but due to his general overall weakness difficult to get a good 
neurologic exam.  
  
ASSESSMENT/PLAN:  
(D47.2) Monoclonal gammopathy (primary encounter diagnosis)  
(D53.9) Macrocytic anemia  
(D69.6) Thrombocytopenia (HCC)  
Assessment:  
-The patient is an 82-year-old male with a past medical history as outlined 
above. He has no history of diabetes but has longstanding worsening sensory 
neuropathy of the feet and left hand. He has polycystic kidney disease as well 
as chronic mild hypercalcemia with elevation of PTH and stable serumcreatinine 
over time. Recently found to have possible serum monoclonal antibody on 
electrophoresis of the serum. Urine negative for monoclonal protein on 
electrophoresis.  
-He also has macrocytic anemia and thrombocytopenia. Prior reports of 
splenomegaly. I personally reviewed CT images from  and . In 2014, 
spleen measured up to about 16 cm in craniocaudal dimension. No mention of fatty
 liver or other morphologic changes of the liver to suggest cirrhosis.  
-I discussed the broad differential of the anemia and thrombocytopenia with the 
patient and his daughter. We also discussed the spectrum of plasma cell 
disorders and the further work-up indicated to accurately diagnose.  
Plan:  
-CBC with staff review.  
-Assess TSH, B12, RBC folate, serum copper and cold agglutinins.  
-Assess serum viscosity.  
-Ultrasound liver and spleen.  
-Repeat protein electrophoresis of serum including immunofixation.  
-24-hour urine collection for protein electrophoresis and immunofixation.  
-Check iron studies in light of his chronic kidney disease.  
-Check coagulation times and fibrinogen rule out low-level DIC is a potential 
cause of thrombocytopenia.  
-OV after above resulted.  
-May need EMG/NCV as part of the work-up depending on results above.  
  
I spent a total of 75 minutes on the date of the service which included 
preparing to see the patient, face-to-face patient care, completing clinical 
documentation, obtaining and/or reviewing separately obtained history, 
performing a medically appropriate examination, counseling and educating the pat
ient/family/caregiver, ordering medications, tests, or procedures, communicating
 with other HCPs (not separately reported), independently interpreting results 
(not separately reported), communicatingresults to the patient/family/caregiver,
 and care coordination (not separately reported).  
  
Tello Ortega DO  
Electronically signed by Tello Ortega DO at 2022 4:00 PM EST  
  
[facility] in this [facility]- Miscellaneous Notes* 
  Telephone Encounter - Chinyere Oliver LPN - 2022 10:01 AM ESTFormatting of
   this note might be different from the original.  
pcp reviewed the rehabilitation and sport therapy note pt was seen there for 
driving assessment.  
Pcp signed form. This has been faxed back to [facility] at [phone]  
  
Electronically signed by Chinyere Oliver LPN at 2022 10:07 AM EST  
  
documented in this encounterK[facility]11- NoteHNO ID: [id number]  
Author: Ade Vazquez OT/L  
Service: ?  
Author Type: Occupational Therapist  
Type: Progress Notes  
Filed: 2022 3:31 PM  
Note Text:  
Episode Visit Count: 1  
Start of Care Date: 22  
Onset Date: (R CVA suspected per recent neurologist visit while MRI  
ordered; diagnosis also of peripheral polyneuropathy)  
Patient Identified by Name and Date of Birth: Yes  
REHABILITATION AND SPORTS THERAPY  
OCCUPATIONAL THERAPY INSTRUMENTAL ADL AND COMMUNITY MOBILITY EVALUATION  
SUBJECTIVE: Mitesh Braun is a 82 year old male seen today for OT  
functional mobility assessment due to diagnosis of peripheral  
polyneuropathy while also suspected R CVA. His daughter was with him and  
provided helpful information throughout. She shared that he has taken  
some significant falls earlier this year and that he had aide assist for  
some time but increased it to 4 days/week after his fall in August outside  
then again 1 week later. More recent fall 3 weeks ago while at high risk  
for falling. She shared that his wife passed away 2020 so he  
has been living on his own since they but she has increased her  
involvement and support in the time since.  
Functional Limitations: walking in the community;walking;bending;heavy  
exertion;stair negotiation;driving  
Prior Level of Function: Required assistance  
Home Environment  
Patient Lives With: Self/Alone  
Assistance Available: Part-Time;Home Aide (daughter involved living around  
the corner from patient and seeing him/checking in frequently)  
Home Type: Multi-Level  
Transportation: Travels as a passenger;SUV  
Patient Goals: to return to driving  
Intake Information: Prescription present  
Falls Interview: Two or more falls in the last year  
Relevant History  
Past Relevant Medical Conditions: Arthritis;Cerebral Vascular  
Accident;Falls;Hypertension;Depression;Neuropathy  
Highest Level of Education: Trade School  
Preferred Language: English  
Right or Left Handed: Right  
Employment: Retired (was )  
Recreation / Current Exercise: no current exercise  
Hobbies / Interests: reads devotionals and cookbooks; rather sedentary  
Home Environment  
Patient Lives With: Self/Alone  
Assistance Available: Part-Time;Home Aide (daughter involved living around  
the corner from patient and seeing him/checking in frequently)  
Home Type: Multi-Level  
Transportation: Travels as a passenger;SUV  
Activities of Daily Living: Modified Independent and supervision for  
showering  
Instrumental Activities of Daily Living: assist for most tasks in the  
home while some light meal preparation on his own; does take his own  
medication but his daughter sets up as well as manages the finances which  
she has been doing since his wife passed away  
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  
State: [place] License/Permit #: [id number] Expires: 25  
Restrictions: corrective lenses; intrastate CDL  
5 Yr. Violation HX: no 5 Yr. MVA HX: no  
Handicap Parking Placard: YES  
_____________________________________________________________________  
1. Mitesh Braun self report indicates an awareness of: Weakness,  
incoordination, or limited motion in L arm and B legs  
Decreased balance  
Tingling or Numbness in: B hands and feet  
Forgetting new information  
2. Mitesh Braun expressed confidence regarding driving when driving  
alone and on familiar road ways.  
3. Mitesh Braun expressed concerns regarding driving at  
night/decreased light conditions, in congested traffic, on the interstate,  
and on long trips.  
OBJECTIVE MEASURES WITH LEVEL OF FUNCTION:  
SENSORIMOTOR ASSESSMENT:  
Hand dominance: Right  
Level of Function Relevant to I ADL, Community Mobility, and Driving:  
Right UE: Sufficient  
Left UE: Marginal while significant contractures/tone in L hand  
: functional for R hand while decreased for L hand  
Right LE: Marginal  
Left LE: Sufficient  
Sitting Balance: Sufficient  
Head / Neck: decreased neck ROM including rotation to right  
Ambulation: Insufficient while requires rollator to maximize safety with  
repeated history of falls in last several months with increased  
vulnerability if out in the community on own  
Transfers: Marginal  
Loading of Device: NA while did not observe him trying to fold/stow his  
rollator while decreased balance issues  
ASSESSMENT OF SENSORIMOTOR FUNCTION: Marginal for Driving  
_____________________________________________________________________  
VISION SCREENING:  
Vision  
Vision Deficits: Wears corrective lenses  
Corrective lenses: bifocals with correction for close up only; does not  
wear always; has had B cataract surgery in past; reports having floaters  
in both eyes  
Corrective Lenses:  
Wears glasses / contact lenses for driving  
Distant Acuity:  
Binocular: 20 / 40  
Nighttime Glare:  
Right: 20 / 40-1  
Left: 20 / 50-1  
Color Perception: pass  
Depth Perception: Pass  
Contrast Sensitivity: mini (more content not included)...[facility]
2022 History of Present illness Narrative* Ade Vazquez OT/MAYANK - 
  2022 4:50 PM ESTFormatting of this note might be different from the 
  original.  
Episode Visit Count: 1  
Start of Care Date: 22  
Onset Date: (R CVA suspected per recent neurologist visit while MRI ordered; 
diagnosis also of peripheral polyneuropathy)  
Patient Identified by Name and Date of Birth: Yes  
  
REHABILITATION AND SPORTS THERAPY  
OCCUPATIONAL THERAPY INSTRUMENTAL ADL AND COMMUNITY MOBILITY EVALUATION  
  
SUBJECTIVE: Mitesh Braun is a 82 year old male seen today for OT functional 
mobility assessmentdue to diagnosis of peripheral polyneuropathy while also 
suspected R CVA. His daughter was with himand provided helpful information 
throughout. She shared that he has taken some significant falls earlier this 
year and that he had aide assist for some time but increased it to 4 days/week 
after his fall in August outside then again 1 week later. More recent fall 3 
weeks ago while at high risk for falling. She shared that his wife passed away 
2020 so he has been living on his own since they but she has 
increased her involvement and support in the time since.  
  
Functional Limitations: walking in the community;walking;bending;heavy 
exertion;stair negotiation;driving  
  
Prior Level of Function: Required assistance  
  
Home Environment  
Patient Lives With: Self/Alone  
Assistance Available: Part-Time;Home Aide (daughter involved living around the 
corner from patient and seeing him/checking in frequently)  
Home Type: Multi-Level  
Transportation: Travels as a passenger;SUV  
  
Patient Goals: to return to driving  
  
Intake Information: Prescription present  
  
  
Falls Interview: Two or more falls in the last year  
  
Relevant History  
Past Relevant Medical Conditions: Arthritis;Cerebral Vascular 
Accident;Falls;Hypertension;Depression;Neuropathy  
Highest Level of Education: Trade School  
Preferred Language: English  
Right or Left Handed: Right  
Employment: Retired (was )  
Recreation / Current Exercise: no current exercise  
Hobbies / Interests: reads devotionals and cookbooks; rather sedentary  
Home Environment  
Patient Lives With: Self/Alone  
Assistance Available: Part-Time;Home Aide (daughter involved living around the 
corner from patient and seeing him/checking in frequently)  
Home Type: Multi-Level  
Transportation: Travels as a passenger;SUV  
  
  
Activities of Daily Living: Modified Independent and supervision for showering  
Instrumental Activities of Daily Living: assist for most tasks in the home while
 some light meal preparation on his own; does take his own medication but his 
daughter sets up as well as manages the finances which she has been doing since 
his wife passed away  
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  
State: [place] License/Permit #: [id number] Expires: 25  
Restrictions: corrective lenses; intrastate CDL  
5 Yr. Violation HX: no 5 Yr. MVA HX: no  
Handicap Parking Placard: YES  
_____________________________________________________________________  
  
1. Mitesh Braun self report indicates an awareness of: Weakness, 
incoordination, or limited motion in L arm and B legs  
Decreased balance  
Tingling or Numbness in: B hands and feet  
Forgetting new information  
2. Mitesh Braun expressed confidence regarding driving when driving alone 
and on familiar road ways.  
3. Mitesh Braun expressed concerns regarding driving at night/decreased 
light conditions, in congested traffic, on the interstate, and on long trips.  
  
OBJECTIVE MEASURES WITH LEVEL OF FUNCTION:  
SENSORIMOTOR ASSESSMENT:  
  
Hand dominance: Right  
Level of Function Relevant to I ADL, Community Mobility, and Driving:  
Right UE: Sufficient  
Left UE: Marginal while significant contractures/tone in L hand  
: functional for R hand while decreased for L hand  
Right LE: Marginal  
Left LE: Sufficient  
Sitting Balance: Sufficient  
Head / Neck: decreased neck ROM including rotation to right  
Ambulation: Insufficient while requires rollator to maximize safety with 
repeated history of falls in last several months with increased vulnerability if
 out in the community on own  
Transfers: Marginal  
Loading of Device: NA while did not observe him trying to fold/stow his rollator
 while decreased balance issues  
  
ASSESSMENT OF SENSORIMOTOR FUNCTION: Marginal for Driving  
_____________________________________________________________________  
  
VISION SCREENING:  
Vision  
Vision Deficits: Wears corrective lenses  
Corrective lenses: bifocals with correction for close up only; does not wear 
always; has had B cataract surgery in past; reports having floaters in both eyes  
  
Corrective Lenses:  
Wears glasses / contact lenses for driving  
Distant Acuity:  
Binocular: 20 / 40  
Nighttime Glare:  
Right: 20 / 40-1  
Left: 20 / 50-1  
  
Color Perception: pass  
Depth Perception: Pass  
Contrast Sensitivity: minimal decrease for R eye while severe impairment for L 
eye  
Peripheral Vision: Not sufficient for driving  
Right Eye: Failed to recognize stimuli: 35 degrees nasally, 55 degrees, 70 
degrees, and 85 degrees  
Left Eye: Failed to recognize stimuli: 70 degrees and 85 degrees  
Pursuits: Inaccurate / Overshoots  
Saccades: Inaccurate / Overshoots  
Convergence: WFL  
Nystagmus: Not Apparent  
Diplopia: No complaints  
Strabismus: No OU  
Cataracts: No OU  
Glaucoma: No. OU  
Other Eye Conditions / Diseases Reported: reports having floaters in B eyes  
  
ASSESSMENT OF VISUAL FUNCTION: Not Suggestive of Safe Driving Potential  
____________________________________________________________________  
  
COGNITIVE / PERCEPTUAL ASSESSMENT:  
  
SHORT BLESSED TEST  
  
Short Blessed Test  
1. What Year Is It Now?: Correct  
2. What Month Is It Now?: Correct  
3. What Time is it? (WIthin 1 hour): Correct  
4. Count Aloud Backwards 20 to 1 (Errors): 0  
5. Months of the Year in Reverse Order (Errors): 2  
6. Memory Phrase (Errors) : 5  
Short Blessed Final Score: 14  
  
Short Blessed Test Scorin-8; Normal to minimal impairment  
9-19; Moderate impairment  
20-28; Severe impairment  
[email or web address]  
  
Immediate Recall: WFL @ 5/6 digits  
  
Visual Scanning/Attention:  
Trail Making Part B (sec): 319 sec  
50th percentile norm for age group: 70-79; Part A: 80 seconds, Part B: 196 
seconds  
  
Homero Clock Drawing Test: Mitesh ARAUZ Kade correctly included 2/8 criteria for 
this test. He failed to include or correctly place: all 12 hours in correct 
numeric order, starting with 12 at the top  
only the numbers 1-12 (no duplicates, omissions, or foreign markings)  
the numbers equally, or nearly so, from each other  
the numbers equally spaced, or nearly so, from the edge of the [place]  
one clock hand at the two o'clock position  
only two clock hands  
  
According to The Physician's Guide to Assessing and Counseling Older Drivers, 
2003, any incorrect element in the Homero Clock Scoring signals a need for 
intervention.  
  
Motor Free Visual Perception Test: MVPT Total Score: 25  
MVPT Processing time (seconds): 10.1  
Norms: 70-81 y/o: Raw Score 25-35; Processing Time 4.5-7.1 seconds +/- .5 
seconds  
  
Visual Inattention / Unilateral Neglect: Not Apparent  
  
ASSESSMENT OF COGNITIVE / PERCEPTUAL FUNCTION: Not Suggestive of Safe Driving 
Potential  
  
___________________________________________________________________  
Altaf  Simulator:  
Simple Brake Reaction Time: Average Distance: 67 feet (Normal = 60 feet)  
R foot pedal operation method; was provided with instructions and time for 
practice prior to screening  
  
  
Education:  
Education  
Learning Preferences: Explanation  
Barriers: Cognitive Limitations;Hearing Deficit  
Learning/educational needs: Safety;Plan of Care  
Education Provided: Yes, see treatment interventions for education provided  
Education Provided To: Patient;Family (daughter)  
Education Mode/Type: Explanation/Discussion  
Response to Education/Teach Back: States/Identifies  
  
TREATMENT:  
Evaluation  
Self-Care Home Management:  
1: refer to documentation for details  
2: provided education and support to patient and his daughter  
Skilled Intervention: Skilled judgment in the selection of proper modification 
for activity of daily living/home management based on clinical presentation, 
deficits, and needs.  
Educated the patient regarding recommendations and provided written instruction 
to facilitate compliance.  
Reviewed patient specific diagnosis in relation to activities of daily 
living/home management.  
Activity progression based on professional judgement.  
  
PLAN OF CARE:  
  
SUMMARY AND RECOMMENDATIONS  
*The information in this report indicates the ability of the  to operate a
 motor vehicle on this date only. Due to the complex nature of the safe 
operation of a motor vehicle, and considering the demands of integrating 
changing environmental conditions, and visual, cognitive, and physical skills, 
successful completion of this program is not a guarantee of safe driving in the 
future.  
________________________________________________________________  
  
ASSESSMENT OF INSTRUMENTAL ADL AND COMMUNITY MOBILITY: Mitesh Braun presents
 with the diagnosisof peripheral polyneuropathy as well as suspected of R CVA. 
He presents with impairments of overshooting with oculomotor skills, history of 
significant falls/significant fall risk, decreased neck ROM, required aide 
assist for ADL/IADL's, decreased L eye glare far acuity/contrast 
sensitivity/DOES NOT MEET THE VISUAL FIELD SCREENING REQUIREMENTS FOR [facility] 
FOR DRIVING, CLOCK DRAWING SCORE, PERFORMANCE ON VISUAL PERCEPTUAL SCREENING 
INCLUDING FOR RAW SCORE AND SLOWED VISUAL PROCESSING SPEED, DECREASED 
CONCENTRATION AND RECALL MEMORY, MARKED IMPAIRMENT ON ALTERNATING ATTENTION 
TASK.  
  
RECOMMENDATIONS:  
ADL/IADL Recommendations: ongoing assist for self care and home management as 
needed while aides currently coming in 4 days/week  
Driving Recommendations: REFRAIN FROM DRIVING WHILE PERMANENT 
CESSATION/intermediate FROM DRIVING INDICATED. THIS THERAPIST WILL PROVIDE 
PHYSICIAN WITH [facility] REPORTING INFORMATION SO THAT LICENSE SUSPENSION PROCESS
 CAN BE INITIATED  
Miller's Visual Field Exam Requested: Yes  
Recommended Complete Eye Exam: Yes  
  
Planned Interventions, Frequency, and Duration: Current Frequency: 1 visit  
Duration: 1 visit  
Total Number of Visits Planned: 0  
  
Patient demonstrates fair understanding of plan of care and treatment. The above
 results were discussed and agreed upon by patient/family.  
  
  
Billing: Total Treatment Time Minutes (timed/untimed) 135 minutes  
Evaluation - Moderate Complexity (42696)  
Self Care / Home Management (82178): 1:1 time: 75 minutes (5 units: 68-82 mins)  
Total time: 135 minutes  
  
CARLOS ENRIQUE Lunsford, CDRS, CDI  
Certified  Rehabilitation Specialist  
  
Electronically signed by CARLOS ENRIQUE Pelaez at 2022 3:31 PM EST  
  
documented in this [facility]- Miscellaneous Notes* 
  Telephone Encounter - Inga Gutierres - 2022 11:43 AM ESTFormatting of this
   note might be different from the original.  
Patient scheduled   
Electronically signed by Inga Gutierres at 2022 11:43 AM EST  
  
* Telephone Encounter - Inga Gutierres - 2022 11:23 AM ESTFormatting of this
   note might be different from the original.  
Spoke to patient. He is going to talk to his daughter to see when she can bring 
him. Patient does not drive anymore. Patient or daughter will call back. Call 
back number provided.  
Electronically signed by Inga Gutierres at 2022 11:24 AM EST  
  
* Telephone Encounter - Angela Vazquez - 2022 10:23 AM ESTFormatting of 
  this note might be different from the original.  
Referral faxed to [facility].  
  
Angela Vazquez  
  
Electronically signed by Angela Vazquez at 2022 10:23 AM EST  
  
* Telephone Encounter - Angela Vazquez - 2022 11:41 AM EDTFormatting of 
  this note might be different from the original.  
New Patient referral received and given to Dr. Newby for review.  
  
DX: ABNORMAL ELECTROPHORESIS/LOW HEMOGLOBIN  
  
REF PROV: ALAYNA PUGH  
  
INS: MEDICARE A & B/MMO SUPPLEMENT  
  
Angela George  
  
Electronically signed by Angela Vazquez at 2022 11:42 AM EDT  
  
documented in this [facility]11- Miscellaneous Notes* 
  Telephone Encounter - Nathalia Peterson RN - 2022 8:49 AM EDTFormatting 
  of this note might be different from the original.  
Pts daughter called and is notified of providers message and instructions. She 
voices understanding.  
  
Nathalia Peterson RN  
  
Electronically signed by Nathalia Peterson RN at 2022 8:50 AM EDT  
  
* Telephone Encounter - Júnior Ahuja MD - 2022 6:16 PM EDT
  Formatting of this note might be different from the original.  
Noted carvedilol discontinued. Continue to monitor BP and call nephrology or 
here for concerns.  
Electronically signed by Júnior Ahuja MD at 2022 6:18 PM EDT  
  
* Telephone Encounter - Lenore Silva Pss - 2022 1:05 PM EDTFormatting 
  of this note might be different from the original.  
Daughter Dixie is calling in regards to patient seeing kidney doctor Dr Alayna Pugh at [facility] yesterday. She states BP was 102/70 but 
hemoglobin was at 9. Provider reduced Carvedilol medication to 6.25 mg. Also 
told him to take Blood Pressure reading in the morning before any medication. If
 top number is under 130 to call her office. This morning BP was 106/60. Dixie 
thencalled Dr Pugh office and patient was then advised to stop the 
Carvedilol. Advised Dixie to call Dr Pugh office to have lab results fax to
 our office.  
Electronically signed by Lenore Silva Pss at 2022 1:14 PM EDT  
  
documented in this [facility]10- Instructions* Patient 
  Instructions*   
  
Júnior Ahuja MD - 10/17/2022 2:53 PM EDT  
  
Formatting of this note might be different from the original.  
DISCONTINUE LISINOPRIL.  
Electronically signed by Júnior Ahuja MD at 10/17/2022 2:53 PM EDT  
  
  
  
documented in this [facility]10- History of Present 
illness Narrative* Júnior Ahuja MD - 10/17/2022 2:34 PM EDTFormatting of 
  this note is different from the original.  
This note was created using [employer].  
Subjective  
Mitesh Braun is a 82 year old male here with daughter. He fell again and was
 in the ER  forhead injury. He was scheduled with neurology. His 
hypertension was still well controlled on one half dose of lisinopril. He was 
scheduled to see his nephrologist in 2 weeks.  
  
Daughter was concerned about his ability to drive safely. We discussed options 
and he felt stronglythat he could still drive safely with left leg weakness. He 
agreed to a driving evaluation.  
  
Review of Systems  
Constitutional: Negative.  
Respiratory: Negative.  
Cardiovascular: Negative.  
Gastrointestinal: Negative.  
Musculoskeletal: Positive for gait problem.  
Neurological: Positive for weakness. Negative for dizziness, light-headedness 
and headaches.  
  
ACTIVE PROBLEM LIST  
Essential hypertension  
Caren On Cpap  
Esophageal Reflux  
Gout  
Mixed hyperlipidemia  
Impaired Fasting Glucose  
Aortic Valve Disorder  
Polycystic Kidney  
Adenomatous Colon Polyp  
Thrombocytopenia (HCC)  
Cad (Coronary Artery Disease), Native Coronary Artery  
Adjustment Disorder With Depressed Mood  
Asthma Exacerbation  
Peripheral Polyneuropathy  
Splenomegaly  
Ckd (Chronic Kidney Disease) Stage 3, Gfr 30-59 Ml/Min ([facility])  
Abnormality of Gait  
Anemia of Chronic Disease  
Urge Incontinence of Urine  
Obesity, Class II, Bmi 35-39.9  
Frequent Falls  
  
Current Outpatient Medications  
Medication Sig  
ascorbic acid-elderberry fruit 100-50 mg chew Take 2 tablets by mouth once 
daily.  
cyanocobalamin (VITAMIN B-12) 2,500 mcg tablet Take 2,500 mcg by mouth once 
daily.  
melatonin 10 mg tab Take 1 tablet by mouth as needed.  
aspirin, enteric coated (ASPIRIN, ENTERIC COATED) 81 mg EC tablet Take 81 mg by 
mouth once daily.  
albuterol HFA (PROVENTIL HFA, VENTOLIN HFA) 90 mcg/actuation inhaler Inhale 2 
Puffs as instructed every 4 hours as needed for wheezing/shortness of breath.  
lisinopril (ZESTRIL) 40 mg tablet Take 0.5 tablets by mouth once daily. ONE HALF
 TABLET DAILY.  
omeprazole (PRILOSEC) 40 mg capsule Take 1 capsule by mouth once daily.  
carvedilol (COREG) 12.5 mg tablet Take 1 tablet by mouth twice daily with meals.  
allopurinol (ZYLOPRIM) 300 mg tablet Take 1 tablet by mouth once daily.  
magnesium oxide 400 mg magnesium cap Take 1 capsule by mouth once daily.  
fenofibrate nanocrystallized (TRICOR) 48 mg tablet Take 1 tablet by mouth once 
daily.  
sertraline (ZOLOFT) 50 mg tablet Take 1 tablet by mouth once daily.  
nitroglycerin sublingual (NITROQUICK) 0.4 mg SL tablet Dissolve 1 tablet under 
the tongue every 5 minutes as needed for chest pain. FOR CHEST PAIN. IF NO 
RELIEF CALL 911  
fluticasone (FLONASE) 50 mcg/actuation nasal spray Use 1 Spray in each nostril 
once daily.  
ferrous sulfate 325 mg (65 mg iron) EC tablet Take 1 tablet by mouth twice daily
 with meals.  
oxybutynin ER (DITROPAN XL) 15 mg 24 hr Extended Rel Tab Take 2 tablets by mouth
 once daily.  
amoxicillin (POLYMOX, AMOXIL) 500 mg capsule Take four tablets, one hour prior 
to dental work (Patient taking differently: Take four tablets by mouth , one 
hour prior to dental work)  
betamethasone valerate 0.1 % lotion Apply to affected area twice daily.  
fluticasone-vilanterol (BREO ELLIPTA) 200-25 mcg/dose inhaler Inhale 1 
Inhalation as instructed once daily. Inhale one puff once daily. DO NOT CLICK 
OPEN UNTIL READY FOR DOSE  
multivitamin tablet Take 1 tablet by mouth once daily.  
  
No current facility-administered medications for this visit.  
  
  
Objective  
/56 (BP Site: Right Arm, BP Position: Sitting, BP Cuff Size: Large Adult) 
  Pulse 64   Temp 36.3 C (97.4 F) (Temporal)   Resp 16   Wt 112.9 kg (249 lb)   
BMI 38.42 kg/m  
Physical Exam  
Constitutional:  
General: He is not in acute distress.  
Cardiovascular:  
Rate and Rhythm: Normal rate and regular rhythm.  
Pulmonary:  
Breath sounds: Normal breath sounds.  
Musculoskeletal:  
Right lower leg: Edema present.  
Left lower leg: Edema present.  
Neurological:  
General: No focal deficit present.  
Mental Status: He is alert.  
Gait: Gait abnormal.  
Comments: Rollator dependent.  
  
Component Latest Ref Rng & Units 2022  
Protein, Total 6.3 - 8.0 g/dL 5.8 (L)  
Albumin 3.9 - 4.9 g/dL 3.5 (L)  
Calcium 8.5 - 10.2 mg/dL 10.8 (H)  
Bilirubin, Total 0.2 - 1.3 mg/dL 0.4  
Alkaline Phosphatase 38 - 113 U/L 85  
AST 14 - 40 U/L 30  
ALT 10 - 54 U/L 21  
Glucose 74 - 99 mg/dL 108 (H)  
BUN 9 - 24 mg/dL 33 (H)  
Creatinine 0.73 - 1.22 mg/dL 1.31 (H)  
Sodium 136 - 144 mmol/L 140  
Potassium 3.7 - 5.1 mmol/L 5.5 (H)  
Chloride 97 - 105 mmol/L 108 (H)  
CO2 22 - 30 mmol/L 25  
Anion Gap 9 - 18 mmol/L 7 (L)  
eGFR >=60 mL/min/1.73m 54 (L)  
WBC 3.70 - 11.00 k/uL 5.22  
RBC 4.20 - 6.00 m/uL 3.27 (L)  
Hemoglobin 13.0 - 17.0 g/dL 10.9 (L)  
Hematocrit 39.0 - 51.0 % 34.6 (L)  
MCV 80.0 - 100.0 fL 105.8 (H)  
MCH 26.0 - 34.0 pg 33.3  
MCHC 30.5 - 36.0 g/dL 31.5  
RDW-CV 11.5 - 15.0 % 15.0  
Platelet Count 150 - 400 k/uL 69 (L)  
MPV 9.0 - 12.7 fL 12.2  
Absolute nRBC <0.01 k/uL <0.01  
  
Assessment and Plan  
  
1. Peripheral polyneuropathy - ICD9: 356.9, ICD10: G62.9 (primary diagnosis)  
- To see neurology.  
- CONSULT TO OCCUPATIONAL THERE for driving safety evaluation.  
  
2. Abnormality of gait - ICD9: 781.2, ICD10: R26.9  
- CONSULT TO OCCUPATIONAL THER  
  
3. Stage 3a chronic kidney disease (HCC) - ICD9: 585.3, ICD10: N18.31  
- eGFR: Stable  
- Discontinue LISINOPRIL.  
- BASIC METABOLIC PNL in 2 weeks.  
- Nephrology in 2 weeks.  
  
4. Essential hypertension - ICD9: 401.9, ICD10: I10  
- good control  
- See #3.  
  
Júnior Ahuja MD  
  
  
Electronically signed by Júnior Ahuja MD at 10/17/2022 3:04 PM EDT  
  
[facility] this [facility]*  
  PT DISCHARGE - Brenda Hernandez PT - 10/12/2022 10:41 AM EDT
  Formatting of this note might be different from the original.  
SITUATION: private duty caregiver present during today's visit. patient reports 
the following sincethe last homecare visit: medications/allergies--no changes, 
no fall. patient reports he is doing good . States he would love to continue 
home PT if he could  
  
BACKGROUND: Diagnoses (reason for Home Care): falls  
Weight Bearing/Precaution Changes: no changes  
  
ASSESSMENT:  
Focus of visit: reassessment/discharge  
Explained to pt that he has plateaued and it would be a maintenance situation. 
[facility] assist him with his HEP during their 
visit  
  
Physical therapy discharged: goals achieved.  
  
Functional performance at discharge - bed mobility independent, transfers 
independent, ambulation independent and stairs supervision.  
  
Plan of care, goals, and discharge reviewed and agreed upon with patient and/or 
caregiver.  
  
RECOMMENDATION:  
Patient discharged from home health services.  
  
Instructions to include:home exercise program as directed  
  
See intervention summary for intervention/education details.  
Electronically signed by Brenda Hernandez PT at 10/12/2022 4:20 PM EDT  
  
documented in this [facility]- [facility]*  
  PT ROUTINE/REASSESSMENT/RECERT/CASE MGMT - Lalitha Pily, PTA - 10/10/2022 8:53
   AM EDTFormatting of this note might be different from the original.  
SITUATION:  
private duty caregiver present during today's visit. patient reports the 
following since the last homecare visit: medications/allergies--no changes, no 
fall. patient reports he is doing good .  
  
BACKGROUND:  
Diagnoses (reason for Home Care): falls  
  
Weight Bearing/Precaution Changes: no changes  
  
ASSESSMENT:  
Focus of visit seted and standing strength and balance exercises for HEP. 
patient remains challenged w/ seated left hip flexion but otherwise does well w/
 exercises. to continue w/ ww or rollator forall ambulation for safety  
  
Plan of care, goals, and visit frequency reviewed and agreed upon with patient 
and/or caregiver.  
  
Current Discharge Plan: family support  
Anticipate discharge by 10/12/22  
  
RECOMMENDATION:  
Next visit to focus on PT to see for possible DC  
  
See intervention summary for intervention/education details.  
Electronically signed by Lalitha Nascimento PTA at 10/10/2022 12:00 PM EDT  
  
documented in this [facility]- [facility]* HH 
  PT ROUTINE/REASSESSMENT/RECERT/CASE MGMT - Lalitha Nascimento PTA - 10/07/2022 2:31
   PM EDTFormatting of this note might be different from the original.  
SITUATION:  
only patient present during today's visit. patient reports the following since 
the last homecare visit: medications/allergies--no changes, no fall. patient 
reports he is doing ok today.  
  
BACKGROUND:  
Diagnoses (reason for Home Care): physicians referral for falls and neuropathy  
  
fall last week with L hip contusion  
  
Weight Bearing/Precaution Changes: no changes  
  
ASSESSMENT:  
Focus of visit resumed standing exercises for strengthening and balance. gait 
training w/ww, transfers  
  
Plan of care, goals, and visit frequency reviewed and agreed upon with patient 
and/or caregiver.  
  
Current Discharge Plan: independent with home exercise program  
Anticipate discharge by 10/22/22  
  
RECOMMENDATION:  
Next visit to focus on increase reps ifable  
  
See intervention summary for intervention/education details.  
Electronically signed by Lalitha Nascimento PTA at 10/07/2022 3:22 PM EDT  
  
documented in this encounterK[facility]- [facility]ellaneous Notes* 
  Telephone Encounter - Nathalia Peterson RN - 10/05/2022 3:02 PM EDTFormatting 
  of this note might be different from the original.  
Called and left a voicemail for the Patient's daughter to call back and ask for 
a nurse to receive the providers message. Faxed orders, last OV note, labs, and 
x-rays to [facility] at fax # [phone].  
  
Nathalia Peterson RN  
  
Electronically signed by Nathalia Peterson RN at 10/05/2022 3:05 PM EDT  
  
* Telephone Encounter - Júnior Ahuja MD - 10/05/2022 1:13 PM EDT
  Formatting of this note might be different from the original.  
ASSESSMENT/PLAN:  
1. Frequent falls - ICD9: V15.88, ICD10: R29.6 (primary diagnosis)  
- CONSULT TO NEUROLOGY  
  
2. Peripheral polyneuropathy - ICD9: 356.9, ICD10: G62.9  
- CONSULT TO NEUROLOGY  
  
Júnior Ahuja MD  
Electronically signed by Júnior Ahuja MD at 10/05/2022 1:13 PM EDT  
  
* Telephone Encounter - Melva Khalil RN - 10/03/2022 11:19 AM EDT
  Formatting of this note might be different from the original.  
Daughter (Dixie) calls to report that patient had another fall over the weekend.
 She is asking if provider would consider a consult to neurology for neuropathy 
and falls.  
  
Pended for review. Needs diagnosis. Dixie reports she would want it to be sent 
to [facility].  
  
Fax number is [phone].  
Phone number is [phone].  
  
Please review and advise,  
  
Melva Khalil RN  
  
  
Electronically signed by Melva Khalil RN at 10/03/2022 11:30 AM EDT  
  
documented in this encounterK[facility]- [facility]iscellaneous Notes* 
  Telephone Encounter - Lalitha Nascimento PTA - 10/05/2022 2:35 PM EDTFormatting of 
  this note might be different from the original.  
Patient reported he fell on  when ambulating outside of home. Denies 
injury and was able to complete PT today without concerns. Thanks  
Electronically signed by Lalitha Nascimento PTA at 10/05/2022 2:37 PM EDT  
  
documented in this encounterK[facility]- M[facility]ellaneous Notes* 
  Telephone Encounter - Son Norton Ma - 10/03/2022 1:03 PM EDTFormatting of this
   note might be different from the original.  
Okay given to Neida.  
Son Norton Ma  
  
Electronically signed by Son Norton Ma at 10/03/2022 1:03 PM EDT  
  
* Telephone Encounter - Krista Perez APRN.CNP - 10/03/2022 12:18 PM EDTFormatting 
  of this note might be different from the original.  
Shorty Perez APRN.CNP  
Electronically signed by Krista Perez APRN.CNP at 10/03/2022 12:19 PM EDT  
  
* Telephone Encounter - Starr Marquez LPN - 2022 1:04 PM EDTFormatting 
  of this note might be different from the original.  
Neida from [facility] calling she did her re-assessment and would like to 
continue visits 2 times weekly for 2 more weeks. She is asking for verbal order 
please. If have problem reaching her onwork phone can call her person phone 
number. Please advise  
Electronically signed by Starr Marquez LPN at 2022 1:08 PM EDT  
  
documented in this encounterK[facility]- Miscellaneous Notes*  
  PT ROUTINE/REASSESSMENT/RECERT/CASE MGMT - Neida Gutierrez, PT - 2022 11:24
   AM EDTFormatting of this note might be different from the original.  
SITUATION:  
private duty caregiver present during today's visit. patient reports the 
following since the last homecare visit: medications/allergies--no changes, no 
fall. patient reports he thinks he needs more PT, .  
  
BACKGROUND:  
  
Diagnoses (reason for Home Care): physicians referral for falls and neuropathy 
fall last week with L hip contusion  
  
Weight Bearing/Precaution Changes: no changes  
  
ASSESSMENT:  
Focus of visit: reassessment of functional ability. Pt has improved however has 
not achieved goals;only tolerated 3 .5 minutes of standing activity. RPE 4/10. 
He will benefit from continued PT intervention  
  
Plan of care, goals, and visit frequency reviewed and agreed upon with patient 
and/or caregiver.  
  
Current Discharge Plan: independent with home exercise program  
Anticipate discharge by 10/14/22  
  
RECOMMENDATION:  
continue PT 2w2  
Next visit to focus on endurance, ther ex, mobility training  
  
See intervention summary for intervention/education details.  
Electronically signed by Neida Gutierrez, PT at 10/02/2022 7:35 AM EDT  
  
documented in this encounterK[facility]- Miscellaneous Notes*  
  PT ROUTINE/REASSESSMENT/RECERT/CASE MGMT - Lalitha Pily, PTA - 2022 8:56
   AM EDTFormatting of this note might be different from the original.  
SITUATION:  
only patient present during today's visit. patient reports the following since 
the last homecare visit: medications/allergies--no changes, no fall. patient 
reports he is doing ok.  still not as strong as i want to be. .  
  
BACKGROUND:  
Diagnoses (reason for Home Care): physicians referral for falls and neuropathy 
fall last week with L hip contusion  
  
Weight Bearing/Precaution Changes: no changes  
  
ASSESSMENT:  
Focus of visit LE strength and balance exercises for HEP, gait training w/ww to 
increase safety w/ functional mobility. Challenged w/ tband and standing 
exercises. denies increased pain but did report of muscle fatigue after, L>R LE 
Issued NOMNC  
  
Plan of care, goals, and visit frequency reviewed and agreed upon with patient 
and/or caregiver.  
  
Current Discharge Plan: independent with home exercise program  
Anticipate discharge by TBD  
  
RECOMMENDATION:  
Next visit to focus on PT to see for possible DC or reassessment  
  
See intervention summary for intervention/education details.  
Electronically signed by Lalitha Nascimento PTA at 2022 9:50 AM EDT  
  
documented in this encounterK[facility]- [facility]*  
  PT ROUTINE/REASSESSMENT/RECERT/CASE MGMT - Brenda Hernandez, PT - 
  2022 9:01 AM EDTFormatting of this note might be different from the 
  original.  
SITUATION: only patient present during today's visit. patient reports the 
following since the last homecare visit: medications/allergies--no changes, no 
fall. patient reports he has felt really good for past 2 days..  
BACKGROUND: Diagnoses (reason for Home Care): physicians referral for falls and 
neuropathy fall last week with L hip contusion  
Weight Bearing/Precaution Changes: no changes  
ASSESSMENENT: advancing Le exercises Focusing on L HIp and bilateral hamstrings  
Plan of care, goals, and visit frequency reviewed and agreed upon with patient 
and/or caregiver.  
Current Discharge Plan: independent with home exercise program  
Anticipate discharge by 10/1/22  
RECOMMENDATION: Next visit to focus on resume standing exercises for strength 
and balance See intervention summary for intervention/education details.  
Electronically signed by Brenda Hernandez PT at 2022 4:22 PM EDT  
  
documented in this encounterK[facility]- [facility]ellaneous Notes*  
  PT ROUTINE/REASSESSMENT/RECERT/CASE MGMT - Lalitha Nascimento PTA - 2022 2:11
   PM EDTFormatting of this note might be different from the original.  
SITUATION:  
only patient present during today's visit. patient reports the following since 
the last homecare visit: medications/allergies--no changes, no fall. patient 
reports he has felt really good for past 2 days..  
  
BACKGROUND:  
Diagnoses (reason for Home Care): physicians referral for falls and neuropathy 
fall last week with L hip contusion  
  
Weight Bearing/Precaution Changes: no changes  
  
ASSESSMENT:Focus of visit outdoor ambulation including uneven terrain w/ 
rollator and SBA.! seated rest break needed due to LE muscle fatigue transfers 
on/off chair  
  
Plan of care, goals, and visit frequency reviewed and agreed upon with patient 
and/or caregiver.  
  
Current Discharge Plan: independent with home exercise program  
Anticipate discharge by 10/1/22  
  
RECOMMENDATION:  
Next visit to focus on resume standing exercises for strength and balance  
  
See intervention summary for intervention/education details.  
Electronically signed by Lalitha Nascimento PTA at 2022 3:38 PM EDT  
  
documented in this encounterK[facility]- [facility]*  
  PT ROUTINE/REASSESSMENT/RECERT/CASE MGMT - Brenda Hernandez, PT - 
  2022 2:21 PM EDTFormatting of this note might be different from the 
  original.  
SITUATION: only patient present during today's visit. patient reports the 
following since the last homecare visit: medications/allergies--no changes, no 
fall. patient reports that he is a little tired and his L knee is a little sore 
- reports he was standing a lot making pickles yesterday  
BACKGROUND: Diagnoses (reason for Home Care): physicians referral for falls and 
neuropathy fall last week with L hip contusion  
Weight Bearing/Precaution Changes: no changes  
ASSESSMENT: Focus of visit progressiom of strength exercises for HEP, gait 
training w/ww and balce activities. denies increased pain w/ exercises today but
 did report of muscle fatigue  
Plan of care, goals, and visit frequency reviewed and agreed upon with patient 
and/or caregiver.  
Current Discharge Plan: independent with home exercise program Anticipate 
discharge by TBD  
RECOMMENDATION: Next visit to focus on out door ambulation if able See 
intervention summary for intervention/education details.  
Electronically signed by Brenda Hernandez, PT at 2022 4:50 PM EDT  
  
documented in this encounterK[facility]- [facility]*  
  PT ROUTINE/REASSESSMENT/RECERT/CASE MGMT - Lalitha Nascimento, PTA - 2022 2:00
   PM EDTFormatting of this note might be different from the original.  
SITUATION:  
only patient present during today's visit. patient reports the following since 
the last homecare visit: medications/allergies--no changes, no fall. patient 
reports he didnt sleep well last night and woke up with a stiff back.  
  
BACKGROUND:  
Diagnoses (reason for Home Care): physicians referral for falls and neuropathy 
fall last week with L hip contusion  
  
Weight Bearing/Precaution Changes: no changes  
  
ASSESSMENT:  
Focus of visit progressiom of strength exercises for HEP, gait training w/ww and
 balce activities. denies increased pain w/ exercises today but did report of 
muscle fatigue  
  
Plan of care, goals, and visit frequency reviewed and agreed upon with patient 
and/or caregiver.  
  
Current Discharge Plan: independent with home exercise program  
Anticipate discharge by TBD  
  
RECOMMENDATION:  
Next visit to focus on out door ambulation if able  
  
See intervention summary for intervention/education details.  
Electronically signed by Lalitha Nascimento PTA at 2022 3:20 PM EDT  
  
documented in this [facility]- [facility]*  
  PT ROUTINE/REASSESSMENT/RECERT/CASE MGMT - Lalitha Nascimento PTA - 2022 
  10:29 AM EDTFormatting of this note might be different from the original.  
SITUATION:  
private duty caregiver present during today's visit. patient reports the 
following since the last homecare visit: medications/allergies--no changes, no 
fall. patient reports he was only a little soreafter last PT visit . .  
  
BACKGROUND:  
Diagnoses (reason for Home Care): fall w/left hip contusion  
  
Weight Bearing/Precaution Changes: no changes  
  
ASSESSMENT:  
Focus of visit strengthening exexises for HEP and gait training w/ rollator  
  
Plan of care, goals, and visit frequency reviewed and agreed upon with patient 
and/or caregiver.  
  
Current Discharge Plan: family support  
Anticipate discharge by 10/1/22  
  
RECOMMENDATION:  
Next visit to focus on try standing hamstring curls and hip abduction  
  
See intervention summary for intervention/education details.  
Electronically signed by Lalitha Nascimento PTA at 2022 1:13 PM EDT  
  
documented in this encounterK[facility]- [facility]*  
  PT ROUTINE/REASSESSMENT/RECERT/CASE MGMT - Brenda Hernandez, PT - 
  2022 10:21 AM EDTFormatting of this note might be different from the 
  original.  
SITUATION: paid cg present during today's visit. patient reports  Im not too bad
 today  .  
BACKGROUND: Diagnoses (reason for Home Care): physicians referral for falls and 
neuropathy fall last week with L hip contusion  
Past Medical History: Peripheral polyneuropathy Contusion of left hip region, 
subsequent encounter Frequent falls Musc ular weakness Abnormality of gait 
Essential hypertension CAREN On Cpap Esophageal Reflux Gout Mixed hyperlipidemia 
Aortic Valve Disorder Polycystic Kidney Adenomatous Colon Polyp Thrombocytopenia
 CAD (Coronary Artery Disease), Native Coronary Artery Adjustment Disorder With 
Depr essed Mood Asthma Exacerbation Peripheral Polyneuropathy Splenomegaly CKD 
(Chronic Kidney Disease) Stage 3, GFR 30-59 mL/min Anemia of Chronic Disease 
Urge Incontinence of Urine Obesity, Class II, BMI 35-39.9  
Weight Bearing or Surgical Precautions: none  
  
ASSESSMENT:  
Focus of visit DAVID LE ther ex  
  
Plan of care, goals, and visit frequency reviewed and agreed upon with patient 
and/or caregiver.  
  
Current Discharge Plan: independent with home exercise program  
Anticipate discharge by 10/1/22  
  
RECOMMENDATION:  
Next visit to focus on assess for ex tolerance and DOMS , progress as able  
  
See intervention summary for intervention/education details.  
Electronically signed by Brenda Hernandez PT at 2022 5:23 PM EDT  
  
documented in this encounterK[facility]- [facility]ellaneous [facility]*  
  PT SOC/KAYLEE/FOLLOW UP/OTHER - Brenda Hernandez PT - 2022 3:06 PM
   EDTFormatting of this note might be different from the original.  
SITUATION:  
only patient present during today's visit. patient reports  Im hoping you can 
help me stop falling .  
  
BACKGROUND:  
Diagnoses (reason for Home Care): physicians referral for falls and neuropathy  
fall last week with L hip contusion  
  
Past Medical History: Peripheral polyneuropathy  
Contusion of left hip region, subsequent encounter  
Frequent falls  
Musc ular weakness  
Abnormality of gait  
Essential hypertension  
CAREN On Cpap  
Esophageal Reflux  
Gout  
Mixed hyperlipidemia  
Aortic Valve Disorder  
Polycystic Kidney  
Adenomatous Colon Polyp  
Thrombocytopenia  
CAD (Coronary Artery Disease), Native Coronary Artery  
Adjustment Disorder With Depr essed Mood  
Asthma Exacerbation  
Peripheral Polyneuropathy  
Splenomegaly CKD (Chronic Kidney Disease) Stage 3, GFR 30-59 mL/min  
Anemia of Chronic Disease  
Urge Incontinence of Urine  
Obesity, Class II, BMI 35-39.9  
  
Weight Bearing or Surgical Precautions: none  
  
ASSESSMENT:  
[facility]. Reviewed and 
explained homecare services. Plan of care, goals, and visit frequency developed,
 reviewed, and agreed upon with patient and/or caregiver.  
  
Patient Goal: stop falling  
  
Patient will benefit from continued physical therapy to address the following 
deficits: strength, balance, gait and transfers.  
  
Current Discharge Plan: independent with home exercise program. Anticipate 
discharge by 10/1/22.  
  
RECOMMENDATION:  
Next visit to focus on developing HEP  
  
Agreeable to PT;  
  
See intervention summary for intervention/education details.  
Electronically signed by Brenda Hernandez PT at 2022 7:39 PM EDT  
  
documented in this encounterK[facility]- Miscellaneous Notes* 
  Telephone Encounter - Lolis Cervantes RN - 2022 1:02 PM EDTFormatting 
  of this note might be different from the original.  
Spoke with patient's daughter, Dixie. Given message from provider's office. She 
verbalizes understanding.  
Lolis Cervantes RN  
Electronically signed by Lolis Cervantes RN at 2022 1:03 PM EDT  
  
* Telephone Encounter - Helen E Zollinger LPN - 2022 12:56 PM EDT
  Formatting of this note might be different from the original.  
Left message for Dixie/daughter to call & ask to speak to a nurse.  
Helen Zollinger LPN  
  
Electronically signed by Helen E Zollinger LPN at 2022 12:57 PM EDT  
  
* Telephone Encounter - Júnior Ahuja MD - 2022 12:15 PM EDT
  Formatting of this note might be different from the original.  
Referral for home therapy was reordered as clarified with home health.  
  
Patient should consider giving up driving.  
Electronically signed by Júnior Ahuja MD at 2022 12:17 PM EDT  
  
* Telephone Encounter - Melva Khalil RN - 2022 8:45 AM EDT
  Formatting of this note might be different from the original.  
Son-In-Law (Christian) calls in on behalf of daughter Dixie to let provider know 
that patient has had 3more falls over the weekend while at his camper. Patient 
had to be assisted up all 3 times. No injuries but he is no longer able to 
assist self up and it is taking patient a lot longer to go up and down the 
stairs to the camper.  
  
Christian asking about patient continuing to drive and orders for HH therapy. 
Notified Christian that patient would be notified from [facility] as referral is still 
active.  
  
Melva Khalil RN  
  
Electronically signed by Melva Kahlil RN at 2022 8:55 AM EDT  
  
* Telephone Encounter - Lolis Cervantes RN - 2022 4:31 PM EDTFormatting 
  of this note might be different from the original.  
Patient's daughter calling to let PCP know that patient fell today and landed on
 his back and hit the back of his head/neck on a door. He is @ [facility] ER and has 
had a CT scan. His main complaint is neckpain. She says they have been unable to
 start PT due to more information needed. Please see Home Care note in Epic from
 Estephanie Rodriguez LPN sent yesterday.  
Daughter is requesting call back when PT can be initiated.  
Lolis Cervantes RN  
Electronically signed by Lolis Cervantes RN at 2022 4:37 PM EDT  
  
documented in this encounterK[facility]- Miscellaneous Notes* 
  Telephone Encounter - Neida Misael - 2022 9:47 AM EDTFormatting of this 
  note might be different from the original.  
Welcome Home Call:  
  
a. Date and Time: 9:48 AM 2022  
  
b. Contact name/relationship: Mitesh gomez Have you been active with any Home Care company in the last 60 days(such as 
help with bathing, filling medications, checking your blood pressure) ? No.  
  
d. [facility] will be providing your care, are you agreeable to 
starting these services? yes (yes or no)  
  
e. Do you have any upcoming appointments in the next few days, or restrictions 
to your schedule? yes  
We would come to see you in 24-48* from your discharge today; Are you agreeable 
to a visit in that time frame? yes (Yes/ No (if no, when would you like to be 
seen?))  
  
f. Caregiver: Patient is able to manage care independently  
  
Please keep our your medications both over the counter and prescribed out for 
the home care to review, your hospital discharge instructions and write down any
 questions you might have.  
  
In order to maintain a safe environment for our caregivers, [facility] 
[facility] requires anyanimals or weapons present in the home be located in a 
secured location.  
  
Our clinicians will call you the night before or the morning of the appointment.
 Their # may come up restricted but they'll leave a VM for you.  
  
In case you have any questions or concerns in the meantime, our # is 
[phone], option 1  
  
Thank you for your time and have a great day.  
  
Neida Douglas  
  
Electronically signed by Neida Douglas at 2022 9:51 AM EDT  
  
documented in this encounterK[facility]08- Miscellaneous Notes* 
  Telephone Encounter - Estephanie Rodriguez LPN - 2022 4:19 PM EDTFormatting
   of this note might be different from the original.  
Thank you for the Home Care Service referral.  
  
In order to proceed, we require an addendum for the following information on the
 encounter on 2022, per Medicare guidelines.  
  
The patient is homebound because of _______________________.  
The patient requires homecare because of ____________________.  
  
AND  
  
We are unable to use Contusion of hip region, Frequent falls, Muscular weakness,
 and Abnormality ofgait as a primary diagnosis. Please update orders to include 
a primary diagnosis causing symptoms: Contusion of hip region, Frequent falls, 
Muscular weakness, and Abnormality of gait.  
  
Kindly, complete an addendum to your 2022 visit to support homecare needs 
for this patient.  
  
A new order for homecare will need to be submitted with the matching Face to 
Face encounter date noted.  
  
If you have any questions, [facility] Intake can be reached at [phone].  
  
Thank you in advance.  
  
Estephanie Rodriguez LPN  
Central Admissions Intake Nurse  
[phone]  
Electronically signed by Estephanie Rodriguez LPN at 2022 4:23 PM EDT  
  
documented in this encounterK[facility]08- Miscellaneous Notes* 
  Telephone Encounter - Helen E Zollinger LPN - 2022 1:21 PM EDT  
  
Formatting of this note might be different from the original.  
Home number does not have vm set up, left message on daughter's phone for 
Patient to call & speak to nurse for non-urgent results.  
Helen Zollinger LPN  
  
  
  
  
Electronically signed by Helen E Zollinger LPN at 2022 1:22 PM EDT  
  
  
* Telephone Encounter - Helen E Zollinger LPN - 2022 1:17 PM EDT  
  
Formatting of this note might be different from the original.  
----- Message from Júnior Ahuja MD sent at 8/3/2022 11:30 PM EDT -----  
Stable anemia, low platelets. Glucose and kidney function okay and stable.  
  
  
  
Electronically signed by Helen E Zollinger LPN at 2022 1:17 PM EDT  
  
  
documented in this encounterK[facility]07- Instructions* Patient 
  Instructions*   
  
Júnior Ahuja MD - 2022 10:13 AM EDT  
  
  
  
Formatting of this note might be different from the original.  
BLOOD TEST TODAY, NON FASTING.  
  
  
  
Electronically signed by Júnior Ahuja MD at 2022 10:14 AM EDT  
  
  
  
  
documented in this encounterK[facility]07- History of Present 
illness Narrative* Júnior Ahuja MD - 2022 10:04 AM EDT  
  
Formatting of this note is different from the original.  
This note was created using [employer].  
Subjective  
Mitesh Braun is a 82 year old male. He had no new concerns. He did not do 
his labs.  
  
He was supposed to have a colonoscopy last year, but this kept getting cancelled
 and rescheduled for one reason or another. He had a history of tubular adenoma.
 He preferred to stay local for the colonoscopy.  
  
Dr. Mccollum switched him to a BiPAP recently, due to air leaks.  
  
Review of Systems  
Constitutional: Negative.  
Respiratory: Negative for chest tightness and shortness of breath.  
Cardiovascular: Positive for leg swelling. Negative for chest pain and 
palpitations.  
Gastrointestinal: Negative for abdominal pain, blood in stool, constipation and 
diarrhea.  
Genitourinary: Negative.  
Musculoskeletal: Positive for gait problem.  
Hematological: Bruises/bleeds easily.  
  
ACTIVE PROBLEM LIST  
Essential hypertension  
Caren On Cpap  
Esophageal Reflux  
Gout  
Mixed hyperlipidemia  
Impaired Fasting Glucose  
Aortic Valve Disorder  
Polycystic Kidney  
Adenomatous Colon Polyp  
Thrombocytopenia (HCC)  
Cad (Coronary Artery Disease), Native Coronary Artery  
Adjustment Disorder With Depressed Mood  
Asthma Exacerbation  
Peripheral Polyneuropathy  
Splenomegaly  
Ckd (Chronic Kidney Disease) Stage 3, Gfr 30-59 Ml/Min (Hcc)  
Abnormality of Gait  
Anemia of Chronic Disease  
Urge Incontinence of Urine  
Obesity, Class II, Bmi 35-39.9  
  
PAST SURGICAL HISTORY  
Procedure Laterality Date  
ARTHRP KNE CONDYLE&PLATU MEDIAL&LAT COMPARTMENTS Left 2017  
BAL. ASSIST ENTEROSCOPY W/ BX 2011  
CAPSULE ENDO SMALL BOWEL W EGD 2011  
COLONOSCOPY FLX DX W/COLLJ SPEC WHEN PFRMD   
Colonoscopy  
COLONOSCOPY FLX DX W/COLLJ SPEC WHEN PFRMD   
Colonoscopy  
COLONOSCOPY FLX DX W/COLLJ SPEC WHEN PFRMD 2011  
Colonoscopy  
COLONOSCOPY FLX DX W/COLLJ SPEC WHEN PFRMD 2018  
Colonoscopy  
COLONOSCOPY W/BIOPSY SINGLE/MULTIPLE 2008  
ESOPHAGOGASTRODUODENOSCOPY TRANSORAL DIAGNOSTIC 2018  
EGD  
ESOPHAGOGASTRODUODENOSCOPY TRANSORAL DIAGNOSTIC 2019  
EGD  
ESOPHAGOGASTRODUODENOSCOPY TRANSORAL DIAGNOSTIC 2020  
EGD  
EXCISION PILONIDAL CYST/SINUS SIMPLE 1964  
HEMORRHOIDECTOMY INTERNAL RUBBER BAND LIGATIONS 2018 and 18  
OPTX FEM SHFT FX W/INSJ IMED IMPLT W/WO SCREW Left 2020  
L hip cephalo medullary nail.  
RPLCMT AORTIC VALVE OPN W/STENTLESS TISSUE VALVE 10/04/2011  
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  
RPR UMBILICAL HRNA 5 YRS/> REDUCIBLE 09/10/2010  
Hernia repair, umbilical >5yr  
  
  
Current Outpatient Medications  
Medication Sig  
omeprazole (PRILOSEC) 40 mg capsule Take 1 capsule by mouth once daily.  
carvedilol (COREG) 12.5 mg tablet Take 1 tablet by mouth twice daily with meals.  
allopurinol (ZYLOPRIM) 300 mg tablet Take 1 tablet by mouth once daily.  
magnesium oxide 400 mg magnesium cap Take 1 capsule by mouth once daily.  
fenofibrate nanocrystallized (TRICOR) 48 mg tablet Take 1 tablet by mouth once 
daily.  
sertraline (ZOLOFT) 50 mg tablet Take 1 tablet by mouth once daily.  
lisinopril (ZESTRIL) 40 mg tablet Take 1 tablet by mouth once daily.  
nitroglycerin sublingual (NITROQUICK) 0.4 mg SL tablet Dissolve 1 tablet under 
the tongue every 5 minutes as needed for chest pain. FOR CHEST PAIN. IF NO 
RELIEF CALL 911  
fluticasone (FLONASE) 50 mcg/actuation nasal spray Use 1 Spray in each nostril 
once daily.  
ferrous sulfate 325 mg (65 mg iron) EC tablet Take 1 tablet by mouth twice daily
 with meals.  
oxybutynin ER (DITROPAN XL) 15 mg 24 hr Extended Rel Tab Take 2 tablets by mouth
 once daily.  
amoxicillin (POLYMOX, AMOXIL) 500 mg capsule Take four tablets, one hour prior 
to dental work  
betamethasone valerate 0.1 % lotion Apply to affected area twice daily.  
fluticasone-vilanterol (BREO ELLIPTA) 200-25 mcg/dose inhaler Inhale 1 
Inhalation as instructed once daily. Inhale one puff once daily. DO NOT CLICK 
OPEN UNTIL READY FOR DOSE  
multivitamin tablet Take 1 tablet by mouth once daily.  
  
No current facility-administered medications for this visit.  
  
  
Objective  
/66 (BP Site: Left Arm, BP Position: Sitting, BP Cuff Size: Large Adult)  
 Pulse 60   Temp 36.4 C (97.6 F) (Temporal Artery)   Resp 16   Wt 113.4 kg (250 
lb)   BMI 38.58 kg/m  
Physical Exam  
Constitutional:  
General: He is not in acute distress.  
Eyes:  
Conjunctiva/sclera: Conjunctivae normal.  
Cardiovascular:  
Rate and Rhythm: Normal rate and regular rhythm.  
Heart sounds: No murmur heard.  
No gallop.  
Pulmonary:  
Breath sounds: Normal breath sounds.  
Abdominal:  
Palpations: Abdomen is soft.  
Tenderness: There is no abdominal tenderness.  
Musculoskeletal:  
Right lower le+ Pitting Edema present.  
Left lower le+ Pitting Edema present.  
Neurological:  
General: No focal deficit present.  
Mental Status: He is alert and oriented to person, place, and time.  
Comments: Using a rollator.  
  
Assessment and Plan  
  
1. Adenomatous polyp of colon, unspecified part of colon - ICD9: 211.3, ICD10: 
D12.6 (primary diagnosis)  
Refer to Dr. Sutton.  
- CONSULT TO GASTROENTEROLOGY  
  
2. Impaired fasting glucose - ICD9: 790.21, ICD10: R73.01  
Recheck.  
  
3. Stage 3 chronic kidney disease, unspecified whether stage 3a or 3b CKD (HCC) 
- ICD9: 585.3, ICD10: N18.30  
Recheck.  
  
4. Essential hypertension - ICD9: 401.9, ICD10: I10  
- good control  
- Continue current medication(s)  
- Reviewed risks of HTN and principles of treatment  
  
5. Anemia of chronic disease - ICD9: 285.29, ICD10: D63.8  
Recheck.  
  
6. Thrombocytopenia (HCC) - ICD9: 287.5, ICD10: D69.6  
Recheck.  
  
Júnior Ahuja MD  
  
  
  
Electronically signed by Júnior Ahuja MD at 2022 10:24 AM EDT  
  
  
documented in this encounterK[facility]06- Miscellaneous Notes* 
  Telephone Encounter - Annmarie Akhtar MA - 2022 11:24 AM EDT  
  
Formatting of this note might be different from the original.  
Will put an appt. Note for F/U with PCP  to assist with scheduling in 
GASTRO.  
  
Annmarie Akhtar MA  
  
  
  
  
Electronically signed by Annmarie Akhtar MA at 2022 11:25 AM EDT  
  
  
* Telephone Encounter - Maribell Vanegas LPN - 2022 12:44 PM EDT  
  
Formatting of this note might be different from the original.  
Attempted to reach patient to scheduled follow up with Radha. No answer and 
unable to leave a message.  
  
Please have patient keep in mind in order to complete scopes he has to have 
someone bring him to the appointment for the scopes and take him home 
afterwards. He is not able to take public transportation for the scopes.  
  
  
  
  
Electronically signed by Maribell Vanegas LPN at 2022 8:44 AM EDT  
  
  
* Telephone Encounter - Radha Kong APRN.CNP - 06/15/2022 1:33 PM EDT  
  
Formatting of this note might be different from the original.  
He was to have schedule a EGD and Colonoscopy for further evaluation of anemia 
last year.  
  
He would need to reschedule with me to get worked up for scopes. \  
Radha Kong APRN.CNP  
  
  
  
  
Electronically signed by Radha Kong APRN.CNP at 06/15/2022 1:34 PM EDT  
  
  
* Telephone Encounter - Nathalia Peterson RN - 06/15/2022 9:45 AM EDT  
  
Formatting of this note might be different from the original.  
Pt reports he missed a call from us. Let Pt I couldn't find a note anywhere. The
 only thing I couldfind was that he needed to schedule his EGD and Colonoscopy. 
Pt reports provider wanted him to go to somewhere far away and Pt states he 
can't because he is an old man and would need to have someone take him. Pt 
states he is without electricity right now so he will have to call back to 
schedule it.  
  
  
  
Electronically signed by Nathalia Peterson RN at 06/15/2022 9:50 AM EDT  
  
  
documented in this encounterK[facility]- Miscellaneous Notes* 
  Telephone Encounter - Nathalia Peterson RN - 2022 3:00 PM EDT  
  
Formatting of this note is different from the original.  
[facility]. I asked him 
if he had any swelling, and he reports that he has noticed his legs have been 
swelling since he has stopped. Pt asking if provider still wants him to take it.
 Pt also reports he does not have any Nitro, he ran his last bottle through the 
wash. Please call and advise. If provider still would like Pt on HCTZ, it would 
need to be added to medications that need ordered.  
  
Patient has been identified by name and date of birth: Yes  
Patient phones for refill(s):  
Pending Prescriptions Disp Refills  
OMEPRAZOLE 40 MG CAPSULE,DELAYED RELEASE 90 capsule 3  
Sig: Take 1 capsule by mouth once daily.  
JOHNNY: No  
CARVEDILOL 12.5 MG TABLET 180 tablet 3  
Sig: Take 1 tablet by mouth twice daily with meals.  
JOHNNY: No  
ALLOPURINOL 300 MG TABLET 90 tablet 3  
Sig: Take 1 tablet by mouth once daily.  
JOHNNY: No  
MAGNESIUM 400 MG (AS MAGNESIUM OXIDE) CAPSULE 90 capsule 3  
Sig: Take 1 capsule by mouth once daily.  
JOHNNY: No  
FENOFIBRATE NANOCRYSTALLIZED 48 MG TABLET 90 tablet 3  
Sig: Take 1 tablet by mouth once daily.  
JOHNNY: No  
SERTRALINE 50 MG TABLET 90 tablet 3  
Sig: Take 1 tablet by mouth once daily.  
JOHNNY: No  
LISINOPRIL 40 MG TABLET 90 tablet 3  
Sig: Take 1 tablet by mouth once daily.  
JOHNNY: No  
NITROGLYCERIN 0.4 MG SUBLINGUAL TABLET 25 tablet 3  
Sig: Dissolve 1 tablet under the tongue as needed. FOR CHEST PAIN. IF NO RELIEF 
CALL 911  
JOHNNY: No  
  
  
Date of last office visit in primary care: 22  
Future visit: 22  
Last 2 Encounter Wt Readings:  
Date: Wt:  
2022 115.7 kg (255 lb)  
2021 126.6 kg (279 lb)  
Previous labs/tests for medication:  
Cholesterol:  
HDL Cholesterol (mg/dL)  
Date Value  
2022 25  
  
LDL Cholesterol (mg/dL)  
Date Value  
2022 68  
  
ALT (U/L)  
Date Value  
2021 18  
2021 19  
  
Non HDL Cholesterol (mg/dL)  
Date Value  
2022 98  
  
Blood Pressure:  
BUN (mg/dL)  
Date Value  
2022 28  
  
Sodium (mmol/L)  
Date Value  
2022 139  
Last 1 Encounter BP Readings:  
Date: BP:  
2022 128/76  
Blood Counts:  
WBC (k/uL)  
Date Value  
2022 5.31  
  
RBC (m/uL)  
Date Value  
2022 3.72  
  
Hematocrit (%)  
Date Value  
2022 40.3  
  
Hemoglobin (g/dL)  
Date Value  
2022 12.9  
  
Platelet Count (k/uL)  
Date Value  
2022 69  
  
Liver Function:  
ALT (U/L)  
Date Value  
2021 18  
2021 19  
  
AST (U/L)  
Date Value  
2021 30  
2021 31  
  
Please advise. Thank you. Nathalia Peterson, RN  
  
  
  
  
  
Electronically signed by Nathalia Peterson RN at 2022 3:09 PM EDT  
  
  
documented in this encounterK[facility]03- Miscellaneous Notes* 
  Telephone Encounter - Krista Perez APRN.CNP - 2022 1:25 PM EDT  
  
Formatting of this note might be different from the original.  
Shorty ePrez APRN.CNP  
  
  
  
Electronically signed by Krista Perez APRN.CNP at 2022 1:25 PM EDT  
  
  
* Telephone Encounter - Mel Beach LPN - 2022 12:35 PM EDT  
  
Formatting of this note might be different from the original.  
Manual Readin/76 Pulse: 62  
  
Reason for blood pressure check - Last BP elevated  
  
Patient is:  
Taking medication as prescribed Yes  
Took medication today Yes If no, date medication last taken N/A  
Experiencing side effects No  
  
BP was elevated at last appt 22. No BP medication changes were made at that
 time. Taking all medications as prescribed. Denies any chest pain, unusual 
shortness of breath, dizziness, or headaches. No caffeine use. No personal 
history of tobacco use; no current exposure. Alert and oriented.  
  
Pt has been identified by name and birthdate: Yes  
  
Allergies reviewed: Yes  
Latex allergy: no.  
  
Medication - prescribed and OTC reviewed and updated: Yes  
Do you need any prescription refills prior to your next visit: No  
  
Health Maintenance: Reviewed and not up to date and provider notified  
  
Patient advised to continue with current medications and would be contacted if 
any further instructions after review by PCP.  
  
Mel Beach LPN  
  
  
  
  
  
Electronically signed by Mel Beach LPN at 2022 12:35 PM EDT  
  
  
documented in this encounterK[facility]03- History of Present 
illness Narrative* Mel Beach LPN - 2022 12:21 PM EDT  
  
Formatting of this note might be different from the original.  
Manual Readin/76 Pulse: 62  
  
Reason for blood pressure check - Last BP elevated  
  
Patient is:  
Taking medication as prescribed Yes  
Took medication today Yes If no, date medication last taken N/A  
Experiencing side effects No  
  
BP was elevated at last appt 22. No BP medication changes were made at that
 time. Taking all medications as prescribed. Denies any chest pain, unusual 
shortness of breath, dizziness, or headaches. No caffeine use. No personal 
history of tobacco use; no current exposure. Alert and oriented.  
  
Pt has been identified by name and birthdate: Yes  
  
Allergies reviewed: Yes  
Latex allergy: no.  
  
Medication - prescribed and OTC reviewed and updated: Yes  
Do you need any prescription refills prior to your next visit: No  
  
Health Maintenance: Reviewed and not up to date and provider notified  
  
Patient advised to continue with current medications and would be contacted if 
any further instructions after review by PCP.  
  
Mel Beach LPN  
  
  
  
  
Electronically signed by Mel Beach LPN at 2022 12:36 PM EDT  
  
  
documented in this encounterK[facility]01- History of Past illness 
Narrative*   
  
                          Problem      Noted Date   Diagnosed Date Resolved Date  
   
                          Obesity, Class II, BMI 35-39.9 2021  
   
                          Medicare annual wellness visit, subsequent 2019  
   
                                                    Encounter for long-term (cur  
rent) use of   
medications         2018  
   
                                                      
  
  
Overview:Formatting of this note might be different from the original.  
Added automatically from request for surgery [id number]  
   
   
                          History of colonic polyps 2018  
   
                                                      
  
  
Overview:Formatting of this note might be different from the original.  
Added automatically from request for surgery [id number]  
   
   
                          Gastroesophageal reflux disease 2018  
   
                                                      
  
  
Overview:Formatting of this note might be different from the original.  
Added automatically from request for surgery [id number]  
   
   
                          Acute pulmonary edema 10/05/2011                10/06/  
2011  
   
                                                      
  
  
Overview:Formatting of this note might be different from the original.  
10/5/2011  
cardiogenic and noncardiogenic factors  
  
   
   
                          Atelectasis  10/05/2011                10/06/2011  
   
                          Hypotension  10/04/2011                10/06/2011  
   
                                                      
  
  
Overview:Formatting of this note might be different from the original.  
10/4/2011  
goal map 65-80  
   
   
                          Stress hyperglycemia 10/04/2011                10/06/2  
011  
   
                                                      
  
  
Overview:Formatting of this note might be different from the original.  
10/4/2011  
Perioperative insulin resistance and exacerbation of hyperglycemia.  
Control blood glucose with titration of insulin infusion  
  
10/5/2011  
adequate control  
goal FBG<180  
  
   
   
                          Post-op pain 10/04/2011                10/06/2011  
   
                          Mechanically assisted ventilation 10/04/2011            
      10/05/2011  
   
                                                      
  
  
Overview:Formatting of this note might be different from the original.  
10/4/2011  
optimize MV settings, WTE  
current setting:FiO2, 75%, peep 8  
   
   
                          Cardiac insufficiency 10/04/2011                10/05/  
2011  
   
                                                      
  
  
Overview:Formatting of this note might be different from the original.  
10/4/2011  
RV mild to moderate post pump  
goal CI>2.3  
   
   
                          Hypoxemia    10/04/2011                10/06/2011  
   
                          discharge planning 2011                10/19/201  
1  
   
                                                      
  
  
Overview:Formatting of this note might be different from the original.  
age 71,  lives in [place], [place]. No DC needs.  
/  
   
   
                          Pre-op testing 2011                10/04/2011  
   
                                                      
  
  
Overview:Formatting of this note is different from the original.  
Images from the original note were not included.  
[facility]  
PRE-OP CHECKLIST  
  
Surgeon: Deangelo Angulo M.D. Informed Consent Completed: No  
STS Score: 1.4%  
CAD: Yes - CAD on Problem List: Yes  
Is intended procedure a CABG: Yes - is a beta blocker ordered? Yes  
  
H & P completed: Yes  
  
PA/LAT: Completed CT: Completed MRI: N/A LE US: N/A  
  
Cath: Yes - reviewed: Yes Echo:Completed EKG: Completed EF %: 61  
  
PI's: N/A Carotid: Completed Mapping: N/A Dental: Completed PFT's: N/A  
  
Basename 11 0729  
WBC 7.18  
HB 13.1  
HCT 41.8  
  
INR 1.0  
CREAT 1.35  
  
UA: Normal  
HCG:N/A  
  
ABO/ABO Confirmed: Yes  
  
Blood ordered: No  
  
SA Swab: Yes - results: Pending  
  
Last Dose of Anticoagulation: vitamins  
  
Op Note: N/A  
  
Pacemaker Check: N/A  
  
Consults: GI 2001  
  
DM: No  
  
Cardiac Surgical prep: No  
  
SIGNATURE: Krystal Castellanos RN CHECKED BY:  
DATE of SERVICE: 2011  
TIME of SERVICE: 2:23 PM  
  
  
   
   
                          Anemia of chronic disease 2011  
   
                          Intestinal polyp 2011  
   
                                                      
  
  
Overview:Formatting of this note might be different from the original.  
Distal ileum ulcerated polyp.  
   
   
                                                    Nonspecific abnormal results  
 of liver   
function study      2006  
   
                          Impotence of organic origin 2006  
   
                                                    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  
9.9 (morbid   
obesity)                                                    2021  
  
documented as of this encounter (statuses as of 2023)  
[facility]06- History of Past illness Narrative*   
  
                                Problem         Noted Date      Resolved Date  
   
                                Medicare annual wellness visit, subsequent 2019  
   
                                Encounter for long-term (current) use of medicat  
ions 2018  
   
                                                      
  
  
Overview:  
  
  
Formatting of this note might be different from the original.  
Added automatically from request for surgery [id number]   
   
                                History of colonic polyps 2018  
   
                                                      
  
  
Overview:  
  
  
Formatting of this note might be different from the original.  
Added automatically from request for surgery [id number]   
   
                                Gastroesophageal reflux disease 2018  
   
                                                      
  
  
Overview:  
  
  
Formatting of this note might be different from the original.  
Added automatically from request for surgery [id number]   
   
                                Acute pulmonary edema 10/05/2011      10/06/2011  
   
                                                      
  
  
Overview:  
  
  
Formatting of this note might be different from the original.  
10/5/2011  
cardiogenic and noncardiogenic factors  
   
   
                                Atelectasis     10/05/2011      10/06/2011  
   
                                Hypotension     10/04/2011      10/06/2011  
   
                                                      
  
  
Overview:  
  
  
Formatting of this note might be different from the original.  
10/4/2011  
goal map 65-80   
   
                                Stress hyperglycemia 10/04/2011      10/06/2011  
   
                                                      
  
  
Overview:  
  
  
Formatting of this note might be different from the original.  
10/4/2011  
Perioperative insulin resistance and exacerbation of hyperglycemia.  
Control blood glucose with titration of insulin infusion  
  
10/5/2011  
adequate control  
goal FBG<180  
   
   
                                Post-op pain    10/04/2011      10/06/2011  
   
                                Mechanically assisted ventilation 10/04/2011      
  10/05/2011  
   
                                                      
  
  
Overview:  
  
  
Formatting of this note might be different from the original.  
10/4/2011  
optimize MV settings, WTE  
current setting:FiO2, 75%, peep 8   
   
                                Cardiac insufficiency 10/04/2011      10/05/2011  
   
                                                      
  
  
Overview:  
  
  
Formatting of this note might be different from the original.  
10/4/2011  
RV mild to moderate post pump  
goal CI>2.3   
   
                                Hypoxemia       10/04/2011      10/06/2011  
   
                                discharge planning 2011      10/19/2011  
   
                                                      
  
  
Overview:  
  
  
Formatting of this note might be different from the original.  
age 71,  lives in [place], [place]. No DC needs.  
/   
   
                                Pre-op testing  2011      10/04/2011  
   
                                                      
  
  
Overview:  
  
  
Formatting of this note is different from the original.  
Images from the original note were not included.  
[facility]  
PRE-OP CHECKLIST  
  
Surgeon: Deangelo Angulo M.D. Informed Consent Completed: No  
STS Score: 1.4%  
CAD: Yes - CAD on Problem List: Yes  
Is intended procedure a CABG: Yes - is a beta blocker ordered? Yes  
  
H & P completed: Yes  
  
PA/LAT: Completed CT: Completed MRI: N/A LE US: N/A  
  
Cath: Yes - reviewed: Yes Echo:Completed EKG: Completed EF %: 61  
  
PI's: N/A Carotid: Completed Mapping: N/A Dental: Completed PFT's: N/A  
  
Basename 11 0729  
WBC 7.18  
HB 13.1  
HCT 41.8  
  
INR 1.0  
CREAT 1.35  
  
UA: Normal  
HCG:N/A  
  
ABO/ABO Confirmed: Yes  
  
Blood ordered: No  
  
SA Swab: Yes - results: Pending  
  
Last Dose of Anticoagulation: vitamins  
  
Op Note: N/A  
  
Pacemaker Check: N/A  
  
Consults: GI 2001  
  
DM: No  
  
Cardiac Surgical prep: No  
  
SIGNATURE: Krystal Castellanos RN CHECKED BY:  
DATE of SERVICE: 2011  
TIME of SERVICE: 2:23 PM  
  
   
   
                                Anemia of chronic disease 2011  
   
                                Intestinal polyp 2011  
   
                                                      
  
  
Overview:  
  
  
Formatting of this note might be different from the original.  
Distal ileum ulcerated polyp.   
   
                                Nonspecific abnormal results of liver function s  
tudy 2006  
   
                                Impotence of organic origin 2006  
   
                                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  
                 2021  
  
documented as of this encounter (statuses as of 2022)  
[facility]06- History of Past illness Narrative*   
  
                                Problem         Noted Date      Resolved Date  
   
                                Medicare annual wellness visit, subsequent 2019  
   
                                Encounter for long-term (current) use of medicat  
ions 2018  
   
                                                      
  
  
Overview:  
  
  
Formatting of this note might be different from the original.  
Added automatically from request for surgery [id number]   
   
                                History of colonic polyps 2018  
   
                                                      
  
  
Overview:  
  
  
Formatting of this note might be different from the original.  
Added automatically from request for surgery [id number]   
   
                                Gastroesophageal reflux disease 2018  
   
                                                      
  
  
Overview:  
  
  
Formatting of this note might be different from the original.  
Added automatically from request for surgery [id number]   
   
                                Acute pulmonary edema 10/05/2011      10/06/2011  
   
                                                      
  
  
Overview:  
  
  
Formatting of this note might be different from the original.  
10/5/2011  
cardiogenic and noncardiogenic factors  
   
   
                                Atelectasis     10/05/2011      10/06/2011  
   
                                Hypotension     10/04/2011      10/06/2011  
   
                                                      
  
  
Overview:  
  
  
Formatting of this note might be different from the original.  
10/4/2011  
goal map 65-80   
   
                                Stress hyperglycemia 10/04/2011      10/06/2011  
   
                                                      
  
  
Overview:  
  
  
Formatting of this note might be different from the original.  
10/4/2011  
Perioperative insulin resistance and exacerbation of hyperglycemia.  
Control blood glucose with titration of insulin infusion  
  
10/5/2011  
adequate control  
goal FBG<180  
   
   
                                Post-op pain    10/04/2011      10/06/2011  
   
                                Mechanically assisted ventilation 10/04/2011      
  10/05/2011  
   
                                                      
  
  
Overview:  
  
  
Formatting of this note might be different from the original.  
10/4/2011  
optimize MV settings, WTE  
current setting:FiO2, 75%, peep 8   
   
                                Cardiac insufficiency 10/04/2011      10/05/2011  
   
                                                      
  
  
Overview:  
  
  
Formatting of this note might be different from the original.  
10/4/2011  
RV mild to moderate post pump  
goal CI>2.3   
   
                                Hypoxemia       10/04/2011      10/06/2011  
   
                                discharge planning 2011      10/19/2011  
   
                                                      
  
  
Overview:  
  
  
Formatting of this note might be different from the original.  
age 71,  lives in [place], [place]. No DC needs.  
/   
   
                                Pre-op testing  2011      10/04/2011  
   
                                                      
  
  
Overview:  
  
  
Formatting of this note is different from the original.  
Images from the original note were not included.  
[facility]  
PRE-OP CHECKLIST  
  
Surgeon: Deangelo Angulo M.D. Informed Consent Completed: No  
STS Score: 1.4%  
CAD: Yes - CAD on Problem List: Yes  
Is intended procedure a CABG: Yes - is a beta blocker ordered? Yes  
  
H & P completed: Yes  
  
PA/LAT: Completed CT: Completed MRI: N/A LE US: N/A  
  
Cath: Yes - reviewed: Yes Echo:Completed EKG: Completed EF %: 61  
  
PI's: N/A Carotid: Completed Mapping: N/A Dental: Completed PFT's: N/A  
  
Basename 11 0729  
WBC 7.18  
HB 13.1  
HCT 41.8  
  
INR 1.0  
CREAT 1.35  
  
UA: Normal  
HCG:N/A  
  
ABO/ABO Confirmed: Yes  
  
Blood ordered: No  
  
SA Swab: Yes - results: Pending  
  
Last Dose of Anticoagulation: vitamins  
  
Op Note: N/A  
  
Pacemaker Check: N/A  
  
Consults: GI 2001  
  
DM: No  
  
Cardiac Surgical prep: No  
  
SIGNATURE: Krystal Casetllanos RN CHECKED BY:  
DATE of SERVICE: 2011  
TIME of SERVICE: 2:23 PM  
  
   
   
                                Anemia of chronic disease 2011  
   
                                Intestinal polyp 2011  
   
                                                      
  
  
Overview:  
  
  
Formatting of this note might be different from the original.  
Distal ileum ulcerated polyp.   
   
                                Nonspecific abnormal results of liver function s  
tudy 2006  
   
                                Impotence of organic origin 2006  
   
                                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  
                 2021  
  
documented as of this encounter (statuses as of 2022)  
[facility]06- History of Past illness Narrative*   
  
                                Problem         Noted Date      Resolved Date  
   
                                Medicare annual wellness visit, subsequent 2019  
   
                                Encounter for long-term (current) use of medicat  
ions 2018  
   
                                                      
  
  
Overview:  
  
  
Formatting of this note might be different from the original.  
Added automatically from request for surgery [id number]   
   
                                History of colonic polyps 2018  
   
                                                      
  
  
Overview:  
  
  
Formatting of this note might be different from the original.  
Added automatically from request for surgery [id number]   
   
                                Gastroesophageal reflux disease 2018  
   
                                                      
  
  
Overview:  
  
  
Formatting of this note might be different from the original.  
Added automatically from request for surgery [id number]   
   
                                Acute pulmonary edema 10/05/2011      10/06/2011  
   
                                                      
  
  
Overview:  
  
  
Formatting of this note might be different from the original.  
10/5/2011  
cardiogenic and noncardiogenic factors  
   
   
                                Atelectasis     10/05/2011      10/06/2011  
   
                                Hypotension     10/04/2011      10/06/2011  
   
                                                      
  
  
Overview:  
  
  
Formatting of this note might be different from the original.  
10/4/2011  
goal map 65-80   
   
                                Stress hyperglycemia 10/04/2011      10/06/2011  
   
                                                      
  
  
Overview:  
  
  
Formatting of this note might be different from the original.  
10/4/2011  
Perioperative insulin resistance and exacerbation of hyperglycemia.  
Control blood glucose with titration of insulin infusion  
  
10/5/2011  
adequate control  
goal FBG<180  
   
   
                                Post-op pain    10/04/2011      10/06/2011  
   
                                Mechanically assisted ventilation 10/04/2011      
  10/05/2011  
   
                                                      
  
  
Overview:  
  
  
Formatting of this note might be different from the original.  
10/4/2011  
optimize MV settings, WTE  
current setting:FiO2, 75%, peep 8   
   
                                Cardiac insufficiency 10/04/2011      10/05/2011  
   
                                                      
  
  
Overview:  
  
  
Formatting of this note might be different from the original.  
10/4/2011  
RV mild to moderate post pump  
goal CI>2.3   
   
                                Hypoxemia       10/04/2011      10/06/2011  
   
                                discharge planning 2011      10/19/2011  
   
                                                      
  
  
Overview:  
  
  
Formatting of this note might be different from the original.  
age 71,  lives in [place], [place]. No DC needs.  
/   
   
                                Pre-op testing  2011      10/04/2011  
   
                                                      
  
  
Overview:  
  
  
Formatting of this note is different from the original.  
Images from the original note were not included.  
[facility]  
PRE-OP CHECKLIST  
  
Surgeon: Deangelo Angulo M.D. Informed Consent Completed: No  
STS Score: 1.4%  
CAD: Yes - CAD on Problem List: Yes  
Is intended procedure a CABG: Yes - is a beta blocker ordered? Yes  
  
H & P completed: Yes  
  
PA/LAT: Completed CT: Completed MRI: N/A LE US: N/A  
  
Cath: Yes - reviewed: Yes Echo:Completed EKG: Completed EF %: 61  
  
PI's: N/A Carotid: Completed Mapping: N/A Dental: Completed PFT's: N/A  
  
Basename 11 0729  
WBC 7.18  
HB 13.1  
HCT 41.8  
  
INR 1.0  
CREAT 1.35  
  
UA: Normal  
HCG:N/A  
  
ABO/ABO Confirmed: Yes  
  
Blood ordered: No  
  
SA Swab: Yes - results: Pending  
  
Last Dose of Anticoagulation: vitamins  
  
Op Note: N/A  
  
Pacemaker Check: N/A  
  
Consults: GI 2001  
  
DM: No  
  
Cardiac Surgical prep: No  
  
SIGNATURE: Krystal Castellanos RN CHECKED BY:  
DATE of SERVICE: 2011  
TIME of SERVICE: 2:23 PM  
  
   
   
                                Anemia of chronic disease 2011  
   
                                Intestinal polyp 2011  
   
                                                      
  
  
Overview:  
  
  
Formatting of this note might be different from the original.  
Distal ileum ulcerated polyp.   
   
                                Nonspecific abnormal results of liver function s  
pepe 2006  
   
                                Impotence of organic origin 2006      09/0  
2015  
   
                                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  
                 2021  
  
documented as of this encounter (statuses as of 2022)  
[facility]06- History of Past illness Narrative*   
  
                                Problem         Noted Date      Resolved Date  
   
                                Medicare annual wellness visit, subsequent 2019  
   
                                Encounter for long-term (current) use of medicat  
ions 2018  
   
                                                      
  
  
Overview:  
  
  
Formatting of this note might be different from the original.  
Added automatically from request for surgery [id number]   
   
                                History of colonic polyps 2018  
   
                                                      
  
  
Overview:  
  
  
Formatting of this note might be different from the original.  
Added automatically from request for surgery [id number]   
   
                                Gastroesophageal reflux disease 2018  
   
                                                      
  
  
Overview:  
  
  
Formatting of this note might be different from the original.  
Added automatically from request for surgery [id number]   
   
                                Acute pulmonary edema 10/05/2011      10/06/2011  
   
                                                      
  
  
Overview:  
  
  
Formatting of this note might be different from the original.  
10/5/2011  
cardiogenic and noncardiogenic factors  
   
   
                                Atelectasis     10/05/2011      10/06/2011  
   
                                Hypotension     10/04/2011      10/06/2011  
   
                                                      
  
  
Overview:  
  
  
Formatting of this note might be different from the original.  
10/4/2011  
goal map 65-80   
   
                                Stress hyperglycemia 10/04/2011      10/06/2011  
   
                                                      
  
  
Overview:  
  
  
Formatting of this note might be different from the original.  
10/4/2011  
Perioperative insulin resistance and exacerbation of hyperglycemia.  
Control blood glucose with titration of insulin infusion  
  
10/5/2011  
adequate control  
goal FBG<180  
   
   
                                Post-op pain    10/04/2011      10/06/2011  
   
                                Mechanically assisted ventilation 10/04/2011      
  10/05/2011  
   
                                                      
  
  
Overview:  
  
  
Formatting of this note might be different from the original.  
10/4/2011  
optimize MV settings, WTE  
current setting:FiO2, 75%, peep 8   
   
                                Cardiac insufficiency 10/04/2011      10/05/2011  
   
                                                      
  
  
Overview:  
  
  
Formatting of this note might be different from the original.  
10/4/2011  
RV mild to moderate post pump  
goal CI>2.3   
   
                                Hypoxemia       10/04/2011      10/06/2011  
   
                                discharge planning 2011      10/19/2011  
   
                                                      
  
  
Overview:  
  
  
Formatting of this note might be different from the original.  
age 71,  lives in [place], [place]. No DC needs.  
/   
   
                                Pre-op testing  2011      10/04/2011  
   
                                                      
  
  
Overview:  
  
  
Formatting of this note is different from the original.  
Images from the original note were not included.  
[facility]  
PRE-OP CHECKLIST  
  
Surgeon: Deangelo Angulo M.D. Informed Consent Completed: No  
STS Score: 1.4%  
CAD: Yes - CAD on Problem List: Yes  
Is intended procedure a CABG: Yes - is a beta blocker ordered? Yes  
  
H & P completed: Yes  
  
PA/LAT: Completed CT: Completed MRI: N/A LE US: N/A  
  
Cath: Yes - reviewed: Yes Echo:Completed EKG: Completed EF %: 61  
  
PI's: N/A Carotid: Completed Mapping: N/A Dental: Completed PFT's: N/A  
  
Basename 11 0729  
WBC 7.18  
HB 13.1  
HCT 41.8  
  
INR 1.0  
CREAT 1.35  
  
UA: Normal  
HCG:N/A  
  
ABO/ABO Confirmed: Yes  
  
Blood ordered: No  
  
SA Swab: Yes - results: Pending  
  
Last Dose of Anticoagulation: vitamins  
  
Op Note: N/A  
  
Pacemaker Check: N/A  
  
Consults: GI 2001  
  
DM: No  
  
Cardiac Surgical prep: No  
  
SIGNATURE: Krystal Castellanos RN CHECKED BY:  
DATE of SERVICE: 2011  
TIME of SERVICE: 2:23 PM  
  
   
   
                                Anemia of chronic disease 2011  
   
                                Intestinal polyp 2011  
   
                                                      
  
  
Overview:  
  
  
Formatting of this note might be different from the original.  
Distal ileum ulcerated polyp.   
   
                                Nonspecific abnormal results of liver function s  
tudy 2006  
   
                                Impotence of organic origin 2006  
   
                                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  
                 2021  
  
documented as of this encounter (statuses as of 2022)  
[facility]- History of Past illness Narrative*   
  
                                Problem         Noted Date      Resolved Date  
   
                                Medicare annual wellness visit, subsequent 2019  
   
                                Encounter for long-term (current) use of medicat  
ions 2018  
   
                                                      
  
  
Overview:  
  
  
Formatting of this note might be different from the original.  
Added automatically from request for surgery [id number]   
   
                                History of colonic polyps 2018  
   
                                                      
  
  
Overview:  
  
  
Formatting of this note might be different from the original.  
Added automatically from request for surgery [id number]   
   
                                Gastroesophageal reflux disease 2018  
   
                                                      
  
  
Overview:  
  
  
Formatting of this note might be different from the original.  
Added automatically from request for surgery [id number]   
   
                                Acute pulmonary edema 10/05/2011      10/06/2011  
   
                                                      
  
  
Overview:  
  
  
Formatting of this note might be different from the original.  
10/5/2011  
cardiogenic and noncardiogenic factors  
   
   
                                Atelectasis     10/05/2011      10/06/2011  
   
                                Hypotension     10/04/2011      10/06/2011  
   
                                                      
  
  
Overview:  
  
  
Formatting of this note might be different from the original.  
10/4/2011  
goal map 65-80   
   
                                Stress hyperglycemia 10/04/2011      10/06/2011  
   
                                                      
  
  
Overview:  
  
  
Formatting of this note might be different from the original.  
10/4/2011  
Perioperative insulin resistance and exacerbation of hyperglycemia.  
Control blood glucose with titration of insulin infusion  
  
10/5/2011  
adequate control  
goal FBG<180  
   
   
                                Post-op pain    10/04/2011      10/06/2011  
   
                                Mechanically assisted ventilation 10/04/2011      
  10/05/2011  
   
                                                      
  
  
Overview:  
  
  
Formatting of this note might be different from the original.  
10/4/2011  
optimize MV settings, WTE  
current setting:FiO2, 75%, peep 8   
   
                                Cardiac insufficiency 10/04/2011      10/05/2011  
   
                                                      
  
  
Overview:  
  
  
Formatting of this note might be different from the original.  
10/4/2011  
RV mild to moderate post pump  
goal CI>2.3   
   
                                Hypoxemia       10/04/2011      10/06/2011  
   
                                discharge planning 2011      10/19/2011  
   
                                                      
  
  
Overview:  
  
  
Formatting of this note might be different from the original.  
age 71,  lives in [place], [place]. No DC needs.  
/   
   
                                Pre-op testing  2011      10/04/2011  
   
                                                      
  
  
Overview:  
  
  
Formatting of this note is different from the original.  
Images from the original note were not included.  
[facility]  
PRE-OP CHECKLIST  
  
Surgeon: Deangelo Angulo M.D. Informed Consent Completed: No  
STS Score: 1.4%  
CAD: Yes - CAD on Problem List: Yes  
Is intended procedure a CABG: Yes - is a beta blocker ordered? Yes  
  
H & P completed: Yes  
  
PA/LAT: Completed CT: Completed MRI: N/A LE US: N/A  
  
Cath: Yes - reviewed: Yes Echo:Completed EKG: Completed EF %: 61  
  
PI's: N/A Carotid: Completed Mapping: N/A Dental: Completed PFT's: N/A  
  
Basename 11 0729  
WBC 7.18  
HB 13.1  
HCT 41.8  
  
INR 1.0  
CREAT 1.35  
  
UA: Normal  
HCG:N/A  
  
ABO/ABO Confirmed: Yes  
  
Blood ordered: No  
  
SA Swab: Yes - results: Pending  
  
Last Dose of Anticoagulation: vitamins  
  
Op Note: N/A  
  
Pacemaker Check: N/A  
  
Consults: GI 2001  
  
DM: No  
  
Cardiac Surgical prep: No  
  
SIGNATURE: Krystal Castellanos RN CHECKED BY:  
DATE of SERVICE: 2011  
TIME of SERVICE: 2:23 PM  
  
   
   
                                Anemia of chronic disease 2011  
   
                                Intestinal polyp 2011  
   
                                                      
  
  
Overview:  
  
  
Formatting of this note might be different from the original.  
Distal ileum ulcerated polyp.   
   
                                Nonspecific abnormal results of liver function s  
nialldy 2006  
   
                                Impotence of organic origin 2006  
   
                                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  
                 2021  
  
documented as of this encounter (statuses as of 2022)  
[facility]06- History of Past illness Narrative*   
  
                                Problem         Noted Date      Resolved Date  
   
                                Medicare annual wellness visit, subsequent 2019  
   
                                Encounter for long-term (current) use of medicat  
ions 2018  
   
                                                      
  
  
Overview:  
  
  
Formatting of this note might be different from the original.  
Added automatically from request for surgery [id number]   
   
                                History of colonic polyps 2018  
   
                                                      
  
  
Overview:  
  
  
Formatting of this note might be different from the original.  
Added automatically from request for surgery [id number]   
   
                                Gastroesophageal reflux disease 2018  
   
                                                      
  
  
Overview:  
  
  
Formatting of this note might be different from the original.  
Added automatically from request for surgery [id number]   
   
                                Acute pulmonary edema 10/05/2011      10/06/2011  
   
                                                      
  
  
Overview:  
  
  
Formatting of this note might be different from the original.  
10/5/2011  
cardiogenic and noncardiogenic factors  
   
   
                                Atelectasis     10/05/2011      10/06/2011  
   
                                Hypotension     10/04/2011      10/06/2011  
   
                                                      
  
  
Overview:  
  
  
Formatting of this note might be different from the original.  
10/4/2011  
goal map 65-80   
   
                                Stress hyperglycemia 10/04/2011      10/06/2011  
   
                                                      
  
  
Overview:  
  
  
Formatting of this note might be different from the original.  
10/4/2011  
Perioperative insulin resistance and exacerbation of hyperglycemia.  
Control blood glucose with titration of insulin infusion  
  
10/5/2011  
adequate control  
goal FBG<180  
   
   
                                Post-op pain    10/04/2011      10/06/2011  
   
                                Mechanically assisted ventilation 10/04/2011      
  10/05/2011  
   
                                                      
  
  
Overview:  
  
  
Formatting of this note might be different from the original.  
10/4/2011  
optimize MV settings, WTE  
current setting:FiO2, 75%, peep 8   
   
                                Cardiac insufficiency 10/04/2011      10/05/2011  
   
                                                      
  
  
Overview:  
  
  
Formatting of this note might be different from the original.  
10/4/2011  
RV mild to moderate post pump  
goal CI>2.3   
   
                                Hypoxemia       10/04/2011      10/06/2011  
   
                                discharge planning 2011      10/19/2011  
   
                                                      
  
  
Overview:  
  
  
Formatting of this note might be different from the original.  
age 71,  lives in [place], [place]. No DC needs.  
/   
   
                                Pre-op testing  2011      10/04/2011  
   
                                                      
  
  
Overview:  
  
  
Formatting of this note is different from the original.  
Images from the original note were not included.  
[facility]  
PRE-OP CHECKLIST  
  
Surgeon: Deangelo Angulo M.D. Informed Consent Completed: No  
STS Score: 1.4%  
CAD: Yes - CAD on Problem List: Yes  
Is intended procedure a CABG: Yes - is a beta blocker ordered? Yes  
  
H & P completed: Yes  
  
PA/LAT: Completed CT: Completed MRI: N/A LE US: N/A  
  
Cath: Yes - reviewed: Yes Echo:Completed EKG: Completed EF %: 61  
  
PI's: N/A Carotid: Completed Mapping: N/A Dental: Completed PFT's: N/A  
  
Basename 11 0729  
WBC 7.18  
HB 13.1  
HCT 41.8  
  
INR 1.0  
CREAT 1.35  
  
UA: Normal  
HCG:N/A  
  
ABO/ABO Confirmed: Yes  
  
Blood ordered: No  
  
SA Swab: Yes - results: Pending  
  
Last Dose of Anticoagulation: vitamins  
  
Op Note: N/A  
  
Pacemaker Check: N/A  
  
Consults: GI 2001  
  
DM: No  
  
Cardiac Surgical prep: No  
  
SIGNATURE: Krystal Castellanos RN CHECKED BY:  
DATE of SERVICE: 2011  
TIME of SERVICE: 2:23 PM  
  
   
   
                                Anemia of chronic disease 2011  
   
                                Intestinal polyp 2011  
   
                                                      
  
  
Overview:  
  
  
Formatting of this note might be different from the original.  
Distal ileum ulcerated polyp.   
   
                                Nonspecific abnormal results of liver function s  
tudy 2006  
   
                                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  
                 2021  
  
documented as of this encounter (statuses as of 2022)  
[facility]- History of Past illness Narrative*   
  
                                Problem         Noted Date      Resolved Date  
   
                                Medicare annual wellness visit, subsequent 2019  
   
                                Encounter for long-term (current) use of medicat  
ions 2018  
   
                                                      
  
  
Overview:Formatting of this note might be different from the original.  
Added automatically from request for surgery [id number]  
   
   
                                History of colonic polyps 2018  
   
                                                      
  
  
Overview:Formatting of this note might be different from the original.  
Added automatically from request for surgery [id number]  
   
   
                                Gastroesophageal reflux disease 2018  
   
                                                      
  
  
Overview:Formatting of this note might be different from the original.  
Added automatically from request for surgery [id number]  
   
   
                                Acute pulmonary edema 10/05/2011      10/06/2011  
   
                                                      
  
  
Overview:Formatting of this note might be different from the original.  
10/5/2011  
cardiogenic and noncardiogenic factors  
  
   
   
                                Atelectasis     10/05/2011      10/06/2011  
   
                                Hypotension     10/04/2011      10/06/2011  
   
                                                      
  
  
Overview:Formatting of this note might be different from the original.  
10/4/2011  
goal map 65-80  
   
   
                                Stress hyperglycemia 10/04/2011      10/06/2011  
   
                                                      
  
  
Overview:Formatting of this note might be different from the original.  
10/4/2011  
Perioperative insulin resistance and exacerbation of hyperglycemia.  
Control blood glucose with titration of insulin infusion  
  
10/5/2011  
adequate control  
goal FBG<180  
  
   
   
                                Post-op pain    10/04/2011      10/06/2011  
   
                                Mechanically assisted ventilation 10/04/2011      
  10/05/2011  
   
                                                      
  
  
Overview:Formatting of this note might be different from the original.  
10/4/2011  
optimize MV settings, WTE  
current setting:FiO2, 75%, peep 8  
   
   
                                Cardiac insufficiency 10/04/2011      10/05/2011  
   
                                                      
  
  
Overview:Formatting of this note might be different from the original.  
10/4/2011  
RV mild to moderate post pump  
goal CI>2.3  
   
   
                                Hypoxemia       10/04/2011      10/06/2011  
   
                                discharge planning 2011      10/19/2011  
   
                                                      
  
  
Overview:Formatting of this note might be different from the original.  
age 71,  lives in [place], [place]. No DC needs.  
/  
   
   
                                Pre-op testing  2011      10/04/2011  
   
                                                      
  
  
Overview:Formatting of this note is different from the original.  
Images from the original note were not included.  
[facility]  
PRE-OP CHECKLIST  
  
Surgeon: Deangelo Angulo M.D. Informed Consent Completed: No  
STS Score: 1.4%  
CAD: Yes - CAD on Problem List: Yes  
Is intended procedure a CABG: Yes - is a beta blocker ordered? Yes  
  
H & P completed: Yes  
  
PA/LAT: Completed CT: Completed MRI: N/A LE US: N/A  
  
Cath: Yes - reviewed: Yes Echo:Completed EKG: Completed EF %: 61  
  
PI's: N/A Carotid: Completed Mapping: N/A Dental: Completed PFT's: N/A  
  
Basename 11 0729  
WBC 7.18  
HB 13.1  
HCT 41.8  
  
INR 1.0  
CREAT 1.35  
  
UA: Normal  
HCG:N/A  
  
ABO/ABO Confirmed: Yes  
  
Blood ordered: No  
  
SA Swab: Yes - results: Pending  
  
Last Dose of Anticoagulation: vitamins  
  
Op Note: N/A  
  
Pacemaker Check: N/A  
  
Consults: GI 2001  
  
DM: No  
  
Cardiac Surgical prep: No  
  
SIGNATURE: Krystal Castellanos RN CHECKED BY:  
DATE of SERVICE: 2011  
TIME of SERVICE: 2:23 PM  
  
  
   
   
                                Anemia of chronic disease 2011  
   
                                Intestinal polyp 2011  
   
                                                      
  
  
Overview:Formatting of this note might be different from the original.  
Distal ileum ulcerated polyp.  
   
   
                                Nonspecific abnormal results of liver function s  
pepe 2006  
   
                                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  
                 2021  
  
documented as of this encounter (statuses as of 2022)  
[facility]06- History of Past illness Narrative*   
  
                                Problem         Noted Date      Resolved Date  
   
                                Medicare annual wellness visit, subsequent 2019  
   
                                Encounter for long-term (current) use of medicat  
ions 2018  
   
                                                      
  
  
Overview:Formatting of this note might be different from the original.  
Added automatically from request for surgery [id number]  
   
   
                                History of colonic polyps 2018  
   
                                                      
  
  
Overview:Formatting of this note might be different from the original.  
Added automatically from request for surgery [id number]  
   
   
                                Gastroesophageal reflux disease 2018  
   
                                                      
  
  
Overview:Formatting of this note might be different from the original.  
Added automatically from request for surgery [id number]  
   
   
                                Acute pulmonary edema 10/05/2011      10/06/2011  
   
                                                      
  
  
Overview:Formatting of this note might be different from the original.  
10/5/2011  
cardiogenic and noncardiogenic factors  
  
   
   
                                Atelectasis     10/05/2011      10/06/2011  
   
                                Hypotension     10/04/2011      10/06/2011  
   
                                                      
  
  
Overview:Formatting of this note might be different from the original.  
10/4/2011  
goal map 65-80  
   
   
                                Stress hyperglycemia 10/04/2011      10/06/2011  
   
                                                      
  
  
Overview:Formatting of this note might be different from the original.  
10/4/2011  
Perioperative insulin resistance and exacerbation of hyperglycemia.  
Control blood glucose with titration of insulin infusion  
  
10/5/2011  
adequate control  
goal FBG<180  
  
   
   
                                Post-op pain    10/04/2011      10/06/2011  
   
                                Mechanically assisted ventilation 10/04/2011      
  10/05/2011  
   
                                                      
  
  
Overview:Formatting of this note might be different from the original.  
10/4/2011  
optimize MV settings, WTE  
current setting:FiO2, 75%, peep 8  
   
   
                                Cardiac insufficiency 10/04/2011      10/05/2011  
   
                                                      
  
  
Overview:Formatting of this note might be different from the original.  
10/4/2011  
RV mild to moderate post pump  
goal CI>2.3  
   
   
                                Hypoxemia       10/04/2011      10/06/2011  
   
                                discharge planning 2011      10/19/2011  
   
                                                      
  
  
Overview:Formatting of this note might be different from the original.  
age 71,  lives in [place], [place]. No DC needs.  
/  
   
   
                                Pre-op testing  2011      10/04/2011  
   
                                                      
  
  
Overview:Formatting of this note is different from the original.  
Images from the original note were not included.  
[facility]  
PRE-OP CHECKLIST  
  
Surgeon: Deangelo Angulo M.D. Informed Consent Completed: No  
STS Score: 1.4%  
CAD: Yes - CAD on Problem List: Yes  
Is intended procedure a CABG: Yes - is a beta blocker ordered? Yes  
  
H & P completed: Yes  
  
PA/LAT: Completed CT: Completed MRI: N/A LE US: N/A  
  
Cath: Yes - reviewed: Yes Echo:Completed EKG: Completed EF %: 61  
  
PI's: N/A Carotid: Completed Mapping: N/A Dental: Completed PFT's: N/A  
  
Basename 11 0729  
WBC 7.18  
HB 13.1  
HCT 41.8  
  
INR 1.0  
CREAT 1.35  
  
UA: Normal  
HCG:N/A  
  
ABO/ABO Confirmed: Yes  
  
Blood ordered: No  
  
SA Swab: Yes - results: Pending  
  
Last Dose of Anticoagulation: vitamins  
  
Op Note: N/A  
  
Pacemaker Check: N/A  
  
Consults: GI 2001  
  
DM: No  
  
Cardiac Surgical prep: No  
  
SIGNATURE: Krystal Castellanos RN CHECKED BY:  
DATE of SERVICE: 2011  
TIME of SERVICE: 2:23 PM  
  
  
   
   
                                Anemia of chronic disease 2011  
   
                                Intestinal polyp 2011  
   
                                                      
  
  
Overview:Formatting of this note might be different from the original.  
Distal ileum ulcerated polyp.  
   
   
                                Nonspecific abnormal results of liver function s  
tudy 2006  
   
                                Impotence of organic origin 2006  
   
                                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  
                 2021  
  
documented as of this encounter (statuses as of 2022)  
[facility]- History of Past illness Narrative*   
  
                                Problem         Noted Date      Resolved Date  
   
                                Medicare annual wellness visit, subsequent 2019  
   
                                Encounter for long-term (current) use of medicat  
ions 2018  
   
                                                      
  
  
Overview:Formatting of this note might be different from the original.  
Added automatically from request for surgery [id number]  
   
   
                                History of colonic polyps 2018  
   
                                                      
  
  
Overview:Formatting of this note might be different from the original.  
Added automatically from request for surgery [id number]  
   
   
                                Gastroesophageal reflux disease 2018  
   
                                                      
  
  
Overview:Formatting of this note might be different from the original.  
Added automatically from request for surgery [id number]  
   
   
                                Acute pulmonary edema 10/05/2011      10/06/2011  
   
                                                      
  
  
Overview:Formatting of this note might be different from the original.  
10/5/2011  
cardiogenic and noncardiogenic factors  
  
   
   
                                Atelectasis     10/05/2011      10/06/2011  
   
                                Hypotension     10/04/2011      10/06/2011  
   
                                                      
  
  
Overview:Formatting of this note might be different from the original.  
10/4/2011  
goal map 65-80  
   
   
                                Stress hyperglycemia 10/04/2011      10/06/2011  
   
                                                      
  
  
Overview:Formatting of this note might be different from the original.  
10/4/2011  
Perioperative insulin resistance and exacerbation of hyperglycemia.  
Control blood glucose with titration of insulin infusion  
  
10/5/2011  
adequate control  
goal FBG<180  
  
   
   
                                Post-op pain    10/04/2011      10/06/2011  
   
                                Mechanically assisted ventilation 10/04/2011      
  10/05/2011  
   
                                                      
  
  
Overview:Formatting of this note might be different from the original.  
10/4/2011  
optimize MV settings, WTE  
current setting:FiO2, 75%, peep 8  
   
   
                                Cardiac insufficiency 10/04/2011      10/05/2011  
   
                                                      
  
  
Overview:Formatting of this note might be different from the original.  
10/4/2011  
RV mild to moderate post pump  
goal CI>2.3  
   
   
                                Hypoxemia       10/04/2011      10/06/2011  
   
                                discharge planning 2011      10/19/2011  
   
                                                      
  
  
Overview:Formatting of this note might be different from the original.  
age 71,  lives in [place], [place]. No DC needs.  
/  
   
   
                                Pre-op testing  2011      10/04/2011  
   
                                                      
  
  
Overview:Formatting of this note is different from the original.  
Images from the original note were not included.  
[facility]  
PRE-OP CHECKLIST  
  
Surgeon: Deangelo Angulo M.D. Informed Consent Completed: No  
STS Score: 1.4%  
CAD: Yes - CAD on Problem List: Yes  
Is intended procedure a CABG: Yes - is a beta blocker ordered? Yes  
  
H & P completed: Yes  
  
PA/LAT: Completed CT: Completed MRI: N/A LE US: N/A  
  
Cath: Yes - reviewed: Yes Echo:Completed EKG: Completed EF %: 61  
  
PI's: N/A Carotid: Completed Mapping: N/A Dental: Completed PFT's: N/A  
  
Basename 11 0729  
WBC 7.18  
HB 13.1  
HCT 41.8  
  
INR 1.0  
CREAT 1.35  
  
UA: Normal  
HCG:N/A  
  
ABO/ABO Confirmed: Yes  
  
Blood ordered: No  
  
SA Swab: Yes - results: Pending  
  
Last Dose of Anticoagulation: vitamins  
  
Op Note: N/A  
  
Pacemaker Check: N/A  
  
Consults: GI 2001  
  
DM: No  
  
Cardiac Surgical prep: No  
  
SIGNATURE: Krystal Casetllanos RN CHECKED BY:  
DATE of SERVICE: 2011  
TIME of SERVICE: 2:23 PM  
  
  
   
   
                                Anemia of chronic disease 2011  
   
                                Intestinal polyp 2011  
   
                                                      
  
  
Overview:Formatting of this note might be different from the original.  
Distal ileum ulcerated polyp.  
   
   
                                Nonspecific abnormal results of liver function s  
tudy 2006  
   
                                Impotence of organic origin 2006  
   
                                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  
                 2021  
  
documented as of this encounter (statuses as of 2022)  
[facility]- History of Past illness Narrative*   
  
                                Problem         Noted Date      Resolved Date  
   
                                Medicare annual wellness visit, subsequent 2019  
   
                                Encounter for long-term (current) use of medicat  
ions 2018  
   
                                                      
  
  
Overview:Formatting of this note might be different from the original.  
Added automatically from request for surgery [id number]  
   
   
                                History of colonic polyps 2018  
   
                                                      
  
  
Overview:Formatting of this note might be different from the original.  
Added automatically from request for surgery [id number]  
   
   
                                Gastroesophageal reflux disease 2018  
   
                                                      
  
  
Overview:Formatting of this note might be different from the original.  
Added automatically from request for surgery [id number]  
   
   
                                Acute pulmonary edema 10/05/2011      10/06/2011  
   
                                                      
  
  
Overview:Formatting of this note might be different from the original.  
10/5/2011  
cardiogenic and noncardiogenic factors  
  
   
   
                                Atelectasis     10/05/2011      10/06/2011  
   
                                Hypotension     10/04/2011      10/06/2011  
   
                                                      
  
  
Overview:Formatting of this note might be different from the original.  
10/4/2011  
goal map 65-80  
   
   
                                Stress hyperglycemia 10/04/2011      10/06/2011  
   
                                                      
  
  
Overview:Formatting of this note might be different from the original.  
10/4/2011  
Perioperative insulin resistance and exacerbation of hyperglycemia.  
Control blood glucose with titration of insulin infusion  
  
10/5/2011  
adequate control  
goal FBG<180  
  
   
   
                                Post-op pain    10/04/2011      10/06/2011  
   
                                Mechanically assisted ventilation 10/04/2011      
  10/05/2011  
   
                                                      
  
  
Overview:Formatting of this note might be different from the original.  
10/4/2011  
optimize MV settings, WTE  
current setting:FiO2, 75%, peep 8  
   
   
                                Cardiac insufficiency 10/04/2011      10/05/2011  
   
                                                      
  
  
Overview:Formatting of this note might be different from the original.  
10/4/2011  
RV mild to moderate post pump  
goal CI>2.3  
   
   
                                Hypoxemia       10/04/2011      10/06/2011  
   
                                discharge planning 2011      10/19/2011  
   
                                                      
  
  
Overview:Formatting of this note might be different from the original.  
age 71,  lives in [place], [place]. No DC needs.  
/  
   
   
                                Pre-op testing  2011      10/04/2011  
   
                                                      
  
  
Overview:Formatting of this note is different from the original.  
Images from the original note were not included.  
[facility]  
PRE-OP CHECKLIST  
  
Surgeon: Deangelo Angulo M.D. Informed Consent Completed: No  
STS Score: 1.4%  
CAD: Yes - CAD on Problem List: Yes  
Is intended procedure a CABG: Yes - is a beta blocker ordered? Yes  
  
H & P completed: Yes  
  
PA/LAT: Completed CT: Completed MRI: N/A LE US: N/A  
  
Cath: Yes - reviewed: Yes Echo:Completed EKG: Completed EF %: 61  
  
PI's: N/A Carotid: Completed Mapping: N/A Dental: Completed PFT's: N/A  
  
Basename 11 0729  
WBC 7.18  
HB 13.1  
HCT 41.8  
  
INR 1.0  
CREAT 1.35  
  
UA: Normal  
HCG:N/A  
  
ABO/ABO Confirmed: Yes  
  
Blood ordered: No  
  
SA Swab: Yes - results: Pending  
  
Last Dose of Anticoagulation: vitamins  
  
Op Note: N/A  
  
Pacemaker Check: N/A  
  
Consults: GI 2001  
  
DM: No  
  
Cardiac Surgical prep: No  
  
SIGNATURE: Krystal Castellanos RN CHECKED BY:  
DATE of SERVICE: 2011  
TIME of SERVICE: 2:23 PM  
  
  
   
   
                                Anemia of chronic disease 2011  
   
                                Intestinal polyp 2011  
   
                                                      
  
  
Overview:Formatting of this note might be different from the original.  
Distal ileum ulcerated polyp.  
   
   
                                Nonspecific abnormal results of liver function s  
tudy 2006  
   
                                Impotence of organic origin 2006  
   
                                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  
                 2021  
  
documented as of this encounter (statuses as of 2022)  
[facility]06- History of Past illness Narrative*   
  
                                Problem         Noted Date      Resolved Date  
   
                                Medicare annual wellness visit, subsequent 2019  
   
                                Encounter for long-term (current) use of medicat  
ions 2018  
   
                                                      
  
  
Overview:Formatting of this note might be different from the original.  
Added automatically from request for surgery [id number]  
   
   
                                History of colonic polyps 2018  
   
                                                      
  
  
Overview:Formatting of this note might be different from the original.  
Added automatically from request for surgery [id number]  
   
   
                                Gastroesophageal reflux disease 2018  
   
                                                      
  
  
Overview:Formatting of this note might be different from the original.  
Added automatically from request for surgery [id number]  
   
   
                                Acute pulmonary edema 10/05/2011      10/06/2011  
   
                                                      
  
  
Overview:Formatting of this note might be different from the original.  
10/5/2011  
cardiogenic and noncardiogenic factors  
  
   
   
                                Atelectasis     10/05/2011      10/06/2011  
   
                                Hypotension     10/04/2011      10/06/2011  
   
                                                      
  
  
Overview:Formatting of this note might be different from the original.  
10/4/2011  
goal map 65-80  
   
   
                                Stress hyperglycemia 10/04/2011      10/06/2011  
   
                                                      
  
  
Overview:Formatting of this note might be different from the original.  
10/4/2011  
Perioperative insulin resistance and exacerbation of hyperglycemia.  
Control blood glucose with titration of insulin infusion  
  
10/5/2011  
adequate control  
goal FBG<180  
  
   
   
                                Post-op pain    10/04/2011      10/06/2011  
   
                                Mechanically assisted ventilation 10/04/2011      
  10/05/2011  
   
                                                      
  
  
Overview:Formatting of this note might be different from the original.  
10/4/2011  
optimize MV settings, WTE  
current setting:FiO2, 75%, peep 8  
   
   
                                Cardiac insufficiency 10/04/2011      10/05/2011  
   
                                                      
  
  
Overview:Formatting of this note might be different from the original.  
10/4/2011  
RV mild to moderate post pump  
goal CI>2.3  
   
   
                                Hypoxemia       10/04/2011      10/06/2011  
   
                                discharge planning 2011      10/19/2011  
   
                                                      
  
  
Overview:Formatting of this note might be different from the original.  
age 71,  lives in [place], [place]. No DC needs.  
/  
   
   
                                Pre-op testing  2011      10/04/2011  
   
                                                      
  
  
Overview:Formatting of this note is different from the original.  
Images from the original note were not included.  
[facility]  
PRE-OP CHECKLIST  
  
Surgeon: Deangelo Angulo M.D. Informed Consent Completed: No  
STS Score: 1.4%  
CAD: Yes - CAD on Problem List: Yes  
Is intended procedure a CABG: Yes - is a beta blocker ordered? Yes  
  
H & P completed: Yes  
  
PA/LAT: Completed CT: Completed MRI: N/A LE US: N/A  
  
Cath: Yes - reviewed: Yes Echo:Completed EKG: Completed EF %: 61  
  
PI's: N/A Carotid: Completed Mapping: N/A Dental: Completed PFT's: N/A  
  
Basename 11 0729  
WBC 7.18  
HB 13.1  
HCT 41.8  
  
INR 1.0  
CREAT 1.35  
  
UA: Normal  
HCG:N/A  
  
ABO/ABO Confirmed: Yes  
  
Blood ordered: No  
  
SA Swab: Yes - results: Pending  
  
Last Dose of Anticoagulation: vitamins  
  
Op Note: N/A  
  
Pacemaker Check: N/A  
  
Consults: GI 2001  
  
DM: No  
  
Cardiac Surgical prep: No  
  
SIGNATURE: Krystal Castellanos RN CHECKED BY:  
DATE of SERVICE: 2011  
TIME of SERVICE: 2:23 PM  
  
  
   
   
                                Anemia of chronic disease 2011  
   
                                Intestinal polyp 2011  
   
                                                      
  
  
Overview:Formatting of this note might be different from the original.  
Distal ileum ulcerated polyp.  
   
   
                                Nonspecific abnormal results of liver function s  
nialldy 2006  
   
                                Impotence of organic origin 2006  
   
                                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  
                 2021  
  
documented as of this encounter (statuses as of 2022)  
[facility]06- History of Past illness Narrative*   
  
                                Problem         Noted Date      Resolved Date  
   
                                Medicare annual wellness visit, subsequent 2019  
   
                                Encounter for long-term (current) use of medicat  
ions 2018  
   
                                                      
  
  
Overview:Formatting of this note might be different from the original.  
Added automatically from request for surgery [id number]  
   
   
                                History of colonic polyps 2018  
   
                                                      
  
  
Overview:Formatting of this note might be different from the original.  
Added automatically from request for surgery [id number]  
   
   
                                Gastroesophageal reflux disease 2018  
   
                                                      
  
  
Overview:Formatting of this note might be different from the original.  
Added automatically from request for surgery [id number]  
   
   
                                Acute pulmonary edema 10/05/2011      10/06/2011  
   
                                                      
  
  
Overview:Formatting of this note might be different from the original.  
10/5/2011  
cardiogenic and noncardiogenic factors  
  
   
   
                                Atelectasis     10/05/2011      10/06/2011  
   
                                Hypotension     10/04/2011      10/06/2011  
   
                                                      
  
  
Overview:Formatting of this note might be different from the original.  
10/4/2011  
goal map 65-80  
   
   
                                Stress hyperglycemia 10/04/2011      10/06/2011  
   
                                                      
  
  
Overview:Formatting of this note might be different from the original.  
10/4/2011  
Perioperative insulin resistance and exacerbation of hyperglycemia.  
Control blood glucose with titration of insulin infusion  
  
10/5/2011  
adequate control  
goal FBG<180  
  
   
   
                                Post-op pain    10/04/2011      10/06/2011  
   
                                Mechanically assisted ventilation 10/04/2011      
  10/05/2011  
   
                                                      
  
  
Overview:Formatting of this note might be different from the original.  
10/4/2011  
optimize MV settings, WTE  
current setting:FiO2, 75%, peep 8  
   
   
                                Cardiac insufficiency 10/04/2011      10/05/2011  
   
                                                      
  
  
Overview:Formatting of this note might be different from the original.  
10/4/2011  
RV mild to moderate post pump  
goal CI>2.3  
   
   
                                Hypoxemia       10/04/2011      10/06/2011  
   
                                discharge planning 2011      10/19/2011  
   
                                                      
  
  
Overview:Formatting of this note might be different from the original.  
age 71,  lives in [place], [place]. No DC needs.  
/  
   
   
                                Pre-op testing  2011      10/04/2011  
   
                                                      
  
  
Overview:Formatting of this note is different from the original.  
Images from the original note were not included.  
[facility]  
PRE-OP CHECKLIST  
  
Surgeon: Deangelo Angulo M.D. Informed Consent Completed: No  
STS Score: 1.4%  
CAD: Yes - CAD on Problem List: Yes  
Is intended procedure a CABG: Yes - is a beta blocker ordered? Yes  
  
H & P completed: Yes  
  
PA/LAT: Completed CT: Completed MRI: N/A LE US: N/A  
  
Cath: Yes - reviewed: Yes Echo:Completed EKG: Completed EF %: 61  
  
PI's: N/A Carotid: Completed Mapping: N/A Dental: Completed PFT's: N/A  
  
Basename 11 0729  
WBC 7.18  
HB 13.1  
HCT 41.8  
  
INR 1.0  
CREAT 1.35  
  
UA: Normal  
HCG:N/A  
  
ABO/ABO Confirmed: Yes  
  
Blood ordered: No  
  
SA Swab: Yes - results: Pending  
  
Last Dose of Anticoagulation: vitamins  
  
Op Note: N/A  
  
Pacemaker Check: N/A  
  
Consults: GI 2001  
  
DM: No  
  
Cardiac Surgical prep: No  
  
SIGNATURE: Krystal Castellanos RN CHECKED BY:  
DATE of SERVICE: 2011  
TIME of SERVICE: 2:23 PM  
  
  
   
   
                                Anemia of chronic disease 2011  
   
                                Intestinal polyp 2011  
   
                                                      
  
  
Overview:Formatting of this note might be different from the original.  
Distal ileum ulcerated polyp.  
   
   
                                Nonspecific abnormal results of liver function s  
tudy 2006  
   
                                Impotence of organic origin 2006  
   
                                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  
                 2021  
  
documented as of this encounter (statuses as of 2022)  
[facility]06- History of Past illness Narrative*   
  
                                Problem         Noted Date      Resolved Date  
   
                                Medicare annual wellness visit, subsequent 2019  
   
                                Encounter for long-term (current) use of medicat  
ions 2018  
   
                                                      
  
  
Overview:Formatting of this note might be different from the original.  
Added automatically from request for surgery [id number]  
   
   
                                History of colonic polyps 2018  
   
                                                      
  
  
Overview:Formatting of this note might be different from the original.  
Added automatically from request for surgery [id number]  
   
   
                                Gastroesophageal reflux disease 2018  
   
                                                      
  
  
Overview:Formatting of this note might be different from the original.  
Added automatically from request for surgery [id number]  
   
   
                                Acute pulmonary edema 10/05/2011      10/06/2011  
   
                                                      
  
  
Overview:Formatting of this note might be different from the original.  
10/5/2011  
cardiogenic and noncardiogenic factors  
  
   
   
                                Atelectasis     10/05/2011      10/06/2011  
   
                                Hypotension     10/04/2011      10/06/2011  
   
                                                      
  
  
Overview:Formatting of this note might be different from the original.  
10/4/2011  
goal map 65-80  
   
   
                                Stress hyperglycemia 10/04/2011      10/06/2011  
   
                                                      
  
  
Overview:Formatting of this note might be different from the original.  
10/4/2011  
Perioperative insulin resistance and exacerbation of hyperglycemia.  
Control blood glucose with titration of insulin infusion  
  
10/5/2011  
adequate control  
goal FBG<180  
  
   
   
                                Post-op pain    10/04/2011      10/06/2011  
   
                                Mechanically assisted ventilation 10/04/2011      
  10/05/2011  
   
                                                      
  
  
Overview:Formatting of this note might be different from the original.  
10/4/2011  
optimize MV settings, WTE  
current setting:FiO2, 75%, peep 8  
   
   
                                Cardiac insufficiency 10/04/2011      10/05/2011  
   
                                                      
  
  
Overview:Formatting of this note might be different from the original.  
10/4/2011  
RV mild to moderate post pump  
goal CI>2.3  
   
   
                                Hypoxemia       10/04/2011      10/06/2011  
   
                                discharge planning 2011      10/19/2011  
   
                                                      
  
  
Overview:Formatting of this note might be different from the original.  
age 71,  lives in [place], [place]. No DC needs.  
/  
   
   
                                Pre-op testing  2011      10/04/2011  
   
                                                      
  
  
Overview:Formatting of this note is different from the original.  
Images from the original note were not included.  
[facility]  
PRE-OP CHECKLIST  
  
Surgeon: Deangelo Angulo M.D. Informed Consent Completed: No  
STS Score: 1.4%  
CAD: Yes - CAD on Problem List: Yes  
Is intended procedure a CABG: Yes - is a beta blocker ordered? Yes  
  
H & P completed: Yes  
  
PA/LAT: Completed CT: Completed MRI: N/A LE US: N/A  
  
Cath: Yes - reviewed: Yes Echo:Completed EKG: Completed EF %: 61  
  
PI's: N/A Carotid: Completed Mapping: N/A Dental: Completed PFT's: N/A  
  
Basename 11 0729  
WBC 7.18  
HB 13.1  
HCT 41.8  
  
INR 1.0  
CREAT 1.35  
  
UA: Normal  
HCG:N/A  
  
ABO/ABO Confirmed: Yes  
  
Blood ordered: No  
  
SA Swab: Yes - results: Pending  
  
Last Dose of Anticoagulation: vitamins  
  
Op Note: N/A  
  
Pacemaker Check: N/A  
  
Consults: GI 2001  
  
DM: No  
  
Cardiac Surgical prep: No  
  
SIGNATURE: Krystal Castellanos RN CHECKED BY:  
DATE of SERVICE: 2011  
TIME of SERVICE: 2:23 PM  
  
  
   
   
                                Anemia of chronic disease 2011  
   
                                Intestinal polyp 2011  
   
                                                      
  
  
Overview:Formatting of this note might be different from the original.  
Distal ileum ulcerated polyp.  
   
   
                                Nonspecific abnormal results of liver function s  
tudy 2006  
   
                                Impotence of organic origin 2006  
   
                                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  
                 2021  
  
documented as of this encounter (statuses as of 2022)  
[facility]- History of Past illness Narrative*   
  
                                Problem         Noted Date      Resolved Date  
   
                                Medicare annual wellness visit, subsequent 2019  
   
                                Encounter for long-term (current) use of medicat  
ions 2018  
   
                                                      
  
  
Overview:Formatting of this note might be different from the original.  
Added automatically from request for surgery [id number]  
   
   
                                History of colonic polyps 2018  
   
                                                      
  
  
Overview:Formatting of this note might be different from the original.  
Added automatically from request for surgery [id number]  
   
   
                                Gastroesophageal reflux disease 2018  
   
                                                      
  
  
Overview:Formatting of this note might be different from the original.  
Added automatically from request for surgery [id number]  
   
   
                                Acute pulmonary edema 10/05/2011      10/06/2011  
   
                                                      
  
  
Overview:Formatting of this note might be different from the original.  
10/5/2011  
cardiogenic and noncardiogenic factors  
  
   
   
                                Atelectasis     10/05/2011      10/06/2011  
   
                                Hypotension     10/04/2011      10/06/2011  
   
                                                      
  
  
Overview:Formatting of this note might be different from the original.  
10/4/2011  
goal map 65-80  
   
   
                                Stress hyperglycemia 10/04/2011      10/06/2011  
   
                                                      
  
  
Overview:Formatting of this note might be different from the original.  
10/4/2011  
Perioperative insulin resistance and exacerbation of hyperglycemia.  
Control blood glucose with titration of insulin infusion  
  
10/5/2011  
adequate control  
goal FBG<180  
  
   
   
                                Post-op pain    10/04/2011      10/06/2011  
   
                                Mechanically assisted ventilation 10/04/2011      
  10/05/2011  
   
                                                      
  
  
Overview:Formatting of this note might be different from the original.  
10/4/2011  
optimize MV settings, WTE  
current setting:FiO2, 75%, peep 8  
   
   
                                Cardiac insufficiency 10/04/2011      10/05/2011  
   
                                                      
  
  
Overview:Formatting of this note might be different from the original.  
10/4/2011  
RV mild to moderate post pump  
goal CI>2.3  
   
   
                                Hypoxemia       10/04/2011      10/06/2011  
   
                                discharge planning 2011      10/19/2011  
   
                                                      
  
  
Overview:Formatting of this note might be different from the original.  
age 71,  lives in [place], [place]. No DC needs.  
/  
   
   
                                Pre-op testing  2011      10/04/2011  
   
                                                      
  
  
Overview:Formatting of this note is different from the original.  
Images from the original note were not included.  
[facility]  
PRE-OP CHECKLIST  
  
Surgeon: Deangelo Angulo M.D. Informed Consent Completed: No  
STS Score: 1.4%  
CAD: Yes - CAD on Problem List: Yes  
Is intended procedure a CABG: Yes - is a beta blocker ordered? Yes  
  
H & P completed: Yes  
  
PA/LAT: Completed CT: Completed MRI: N/A LE US: N/A  
  
Cath: Yes - reviewed: Yes Echo:Completed EKG: Completed EF %: 61  
  
PI's: N/A Carotid: Completed Mapping: N/A Dental: Completed PFT's: N/A  
  
Basename 11 0729  
WBC 7.18  
HB 13.1  
HCT 41.8  
  
INR 1.0  
CREAT 1.35  
  
UA: Normal  
HCG:N/A  
  
ABO/ABO Confirmed: Yes  
  
Blood ordered: No  
  
SA Swab: Yes - results: Pending  
  
Last Dose of Anticoagulation: vitamins  
  
Op Note: N/A  
  
Pacemaker Check: N/A  
  
Consults: GI 2001  
  
DM: No  
  
Cardiac Surgical prep: No  
  
SIGNATURE: Krystal Castellanos RN CHECKED BY:  
DATE of SERVICE: 2011  
TIME of SERVICE: 2:23 PM  
  
  
   
   
                                Anemia of chronic disease 2011  
   
                                Intestinal polyp 2011  
   
                                                      
  
  
Overview:Formatting of this note might be different from the original.  
Distal ileum ulcerated polyp.  
   
   
                                Nonspecific abnormal results of liver function s  
tudy 2006  
   
                                Impotence of organic origin 2006  
   
                                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  
                 2021  
  
documented as of this encounter (statuses as of 2022)  
[facility]- History of Past illness Narrative*   
  
                                Problem         Noted Date      Resolved Date  
   
                                Medicare annual wellness visit, subsequent 2019  
   
                                Encounter for long-term (current) use of medicat  
ions 2018  
   
                                                      
  
  
Overview:Formatting of this note might be different from the original.  
Added automatically from request for surgery [id number]  
   
   
                                History of colonic polyps 2018  
   
                                                      
  
  
Overview:Formatting of this note might be different from the original.  
Added automatically from request for surgery [id number]  
   
   
                                Gastroesophageal reflux disease 2018  
   
                                                      
  
  
Overview:Formatting of this note might be different from the original.  
Added automatically from request for surgery [id number]  
   
   
                                Acute pulmonary edema 10/05/2011      10/06/2011  
   
                                                      
  
  
Overview:Formatting of this note might be different from the original.  
10/5/2011  
cardiogenic and noncardiogenic factors  
  
   
   
                                Atelectasis     10/05/2011      10/06/2011  
   
                                Hypotension     10/04/2011      10/06/2011  
   
                                                      
  
  
Overview:Formatting of this note might be different from the original.  
10/4/2011  
goal map 65-80  
   
   
                                Stress hyperglycemia 10/04/2011      10/06/2011  
   
                                                      
  
  
Overview:Formatting of this note might be different from the original.  
10/4/2011  
Perioperative insulin resistance and exacerbation of hyperglycemia.  
Control blood glucose with titration of insulin infusion  
  
10/5/2011  
adequate control  
goal FBG<180  
  
   
   
                                Post-op pain    10/04/2011      10/06/2011  
   
                                Mechanically assisted ventilation 10/04/2011      
  10/05/2011  
   
                                                      
  
  
Overview:Formatting of this note might be different from the original.  
10/4/2011  
optimize MV settings, WTE  
current setting:FiO2, 75%, peep 8  
   
   
                                Cardiac insufficiency 10/04/2011      10/05/2011  
   
                                                      
  
  
Overview:Formatting of this note might be different from the original.  
10/4/2011  
RV mild to moderate post pump  
goal CI>2.3  
   
   
                                Hypoxemia       10/04/2011      10/06/2011  
   
                                discharge planning 2011      10/19/2011  
   
                                                      
  
  
Overview:Formatting of this note might be different from the original.  
age 71,  lives in [place], [place]. No DC needs.  
/  
   
   
                                Pre-op testing  2011      10/04/2011  
   
                                                      
  
  
Overview:Formatting of this note is different from the original.  
Images from the original note were not included.  
[facility]  
PRE-OP CHECKLIST  
  
Surgeon: Deangelo Angulo M.D. Informed Consent Completed: No  
STS Score: 1.4%  
CAD: Yes - CAD on Problem List: Yes  
Is intended procedure a CABG: Yes - is a beta blocker ordered? Yes  
  
H & P completed: Yes  
  
PA/LAT: Completed CT: Completed MRI: N/A LE US: N/A  
  
Cath: Yes - reviewed: Yes Echo:Completed EKG: Completed EF %: 61  
  
PI's: N/A Carotid: Completed Mapping: N/A Dental: Completed PFT's: N/A  
  
Basename 11 0729  
WBC 7.18  
HB 13.1  
HCT 41.8  
  
INR 1.0  
CREAT 1.35  
  
UA: Normal  
HCG:N/A  
  
ABO/ABO Confirmed: Yes  
  
Blood ordered: No  
  
SA Swab: Yes - results: Pending  
  
Last Dose of Anticoagulation: vitamins  
  
Op Note: N/A  
  
Pacemaker Check: N/A  
  
Consults: GI 2001  
  
DM: No  
  
Cardiac Surgical prep: No  
  
SIGNATURE: Krystal Castellanos RN CHECKED BY:  
DATE of SERVICE: 2011  
TIME of SERVICE: 2:23 PM  
  
  
   
   
                                Anemia of chronic disease 2011  
   
                                Intestinal polyp 2011  
   
                                                      
  
  
Overview:Formatting of this note might be different from the original.  
Distal ileum ulcerated polyp.  
   
   
                                Nonspecific abnormal results of liver function s  
pepe 2006  
   
                                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  
2015  
   
                                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  
                 2021  
  
documented as of this encounter (statuses as of 2022)  
[facility]06- History of Past illness Narrative*   
  
                                Problem         Noted Date      Resolved Date  
   
                                Medicare annual wellness visit, subsequent 2019  
   
                                Encounter for long-term (current) use of medicat  
ions 2018  
   
                                                      
  
  
Overview:Formatting of this note might be different from the original.  
Added automatically from request for surgery [id number]  
   
   
                                History of colonic polyps 2018  
   
                                                      
  
  
Overview:Formatting of this note might be different from the original.  
Added automatically from request for surgery [id number]  
   
   
                                Gastroesophageal reflux disease 2018  
   
                                                      
  
  
Overview:Formatting of this note might be different from the original.  
Added automatically from request for surgery [id number]  
   
   
                                Acute pulmonary edema 10/05/2011      10/06/2011  
   
                                                      
  
  
Overview:Formatting of this note might be different from the original.  
10/5/2011  
cardiogenic and noncardiogenic factors  
  
   
   
                                Atelectasis     10/05/2011      10/06/2011  
   
                                Hypotension     10/04/2011      10/06/2011  
   
                                                      
  
  
Overview:Formatting of this note might be different from the original.  
10/4/2011  
goal map 65-80  
   
   
                                Stress hyperglycemia 10/04/2011      10/06/2011  
   
                                                      
  
  
Overview:Formatting of this note might be different from the original.  
10/4/2011  
Perioperative insulin resistance and exacerbation of hyperglycemia.  
Control blood glucose with titration of insulin infusion  
  
10/5/2011  
adequate control  
goal FBG<180  
  
   
   
                                Post-op pain    10/04/2011      10/06/2011  
   
                                Mechanically assisted ventilation 10/04/2011      
  10/05/2011  
   
                                                      
  
  
Overview:Formatting of this note might be different from the original.  
10/4/2011  
optimize MV settings, WTE  
current setting:FiO2, 75%, peep 8  
   
   
                                Cardiac insufficiency 10/04/2011      10/05/2011  
   
                                                      
  
  
Overview:Formatting of this note might be different from the original.  
10/4/2011  
RV mild to moderate post pump  
goal CI>2.3  
   
   
                                Hypoxemia       10/04/2011      10/06/2011  
   
                                discharge planning 2011      10/19/2011  
   
                                                      
  
  
Overview:Formatting of this note might be different from the original.  
age 71,  lives in [place], [place]. No DC needs.  
/  
   
   
                                Pre-op testing  2011      10/04/2011  
   
                                                      
  
  
Overview:Formatting of this note is different from the original.  
Images from the original note were not included.  
[facility]  
PRE-OP CHECKLIST  
  
Surgeon: Deangelo Angulo M.D. Informed Consent Completed: No  
STS Score: 1.4%  
CAD: Yes - CAD on Problem List: Yes  
Is intended procedure a CABG: Yes - is a beta blocker ordered? Yes  
  
H & P completed: Yes  
  
PA/LAT: Completed CT: Completed MRI: N/A LE US: N/A  
  
Cath: Yes - reviewed: Yes Echo:Completed EKG: Completed EF %: 61  
  
PI's: N/A Carotid: Completed Mapping: N/A Dental: Completed PFT's: N/A  
  
Basename 11 0729  
WBC 7.18  
HB 13.1  
HCT 41.8  
  
INR 1.0  
CREAT 1.35  
  
UA: Normal  
HCG:N/A  
  
ABO/ABO Confirmed: Yes  
  
Blood ordered: No  
  
SA Swab: Yes - results: Pending  
  
Last Dose of Anticoagulation: vitamins  
  
Op Note: N/A  
  
Pacemaker Check: N/A  
  
Consults: GI 2001  
  
DM: No  
  
Cardiac Surgical prep: No  
  
SIGNATURE: Krystal Castellanos RN CHECKED BY:  
DATE of SERVICE: 2011  
TIME of SERVICE: 2:23 PM  
  
  
   
   
                                Anemia of chronic disease 2011  
   
                                Intestinal polyp 2011  
   
                                                      
  
  
Overview:Formatting of this note might be different from the original.  
Distal ileum ulcerated polyp.  
   
   
                                Nonspecific abnormal results of liver function s  
nialldy 2006  
   
                                Impotence of organic origin 2006  
   
                                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  
                 2021  
  
documented as of this encounter (statuses as of 2022)  
[facility]06- History of Past illness Narrative*   
  
                                Problem         Noted Date      Resolved Date  
   
                                Medicare annual wellness visit, subsequent 2019  
   
                                Encounter for long-term (current) use of medicat  
ions 2018  
   
                                                      
  
  
Overview:Formatting of this note might be different from the original.  
Added automatically from request for surgery [id number]  
   
   
                                History of colonic polyps 2018  
   
                                                      
  
  
Overview:Formatting of this note might be different from the original.  
Added automatically from request for surgery [id number]  
   
   
                                Gastroesophageal reflux disease 2018  
   
                                                      
  
  
Overview:Formatting of this note might be different from the original.  
Added automatically from request for surgery [id number]  
   
   
                                Acute pulmonary edema 10/05/2011      10/06/2011  
   
                                                      
  
  
Overview:Formatting of this note might be different from the original.  
10/5/2011  
cardiogenic and noncardiogenic factors  
  
   
   
                                Atelectasis     10/05/2011      10/06/2011  
   
                                Hypotension     10/04/2011      10/06/2011  
   
                                                      
  
  
Overview:Formatting of this note might be different from the original.  
10/4/2011  
goal map 65-80  
   
   
                                Stress hyperglycemia 10/04/2011      10/06/2011  
   
                                                      
  
  
Overview:Formatting of this note might be different from the original.  
10/4/2011  
Perioperative insulin resistance and exacerbation of hyperglycemia.  
Control blood glucose with titration of insulin infusion  
  
10/5/2011  
adequate control  
goal FBG<180  
  
   
   
                                Post-op pain    10/04/2011      10/06/2011  
   
                                Mechanically assisted ventilation 10/04/2011      
  10/05/2011  
   
                                                      
  
  
Overview:Formatting of this note might be different from the original.  
10/4/2011  
optimize MV settings, WTE  
current setting:FiO2, 75%, peep 8  
   
   
                                Cardiac insufficiency 10/04/2011      10/05/2011  
   
                                                      
  
  
Overview:Formatting of this note might be different from the original.  
10/4/2011  
RV mild to moderate post pump  
goal CI>2.3  
   
   
                                Hypoxemia       10/04/2011      10/06/2011  
   
                                discharge planning 2011      10/19/2011  
   
                                                      
  
  
Overview:Formatting of this note might be different from the original.  
age 71,  lives in [place], [place]. No DC needs.  
/  
   
   
                                Pre-op testing  2011      10/04/2011  
   
                                                      
  
  
Overview:Formatting of this note is different from the original.  
Images from the original note were not included.  
[facility]  
PRE-OP CHECKLIST  
  
Surgeon: Deangelo Angulo M.D. Informed Consent Completed: No  
STS Score: 1.4%  
CAD: Yes - CAD on Problem List: Yes  
Is intended procedure a CABG: Yes - is a beta blocker ordered? Yes  
  
H & P completed: Yes  
  
PA/LAT: Completed CT: Completed MRI: N/A LE US: N/A  
  
Cath: Yes - reviewed: Yes Echo:Completed EKG: Completed EF %: 61  
  
PI's: N/A Carotid: Completed Mapping: N/A Dental: Completed PFT's: N/A  
  
Basename 11 0729  
WBC 7.18  
HB 13.1  
HCT 41.8  
  
INR 1.0  
CREAT 1.35  
  
UA: Normal  
HCG:N/A  
  
ABO/ABO Confirmed: Yes  
  
Blood ordered: No  
  
SA Swab: Yes - results: Pending  
  
Last Dose of Anticoagulation: vitamins  
  
Op Note: N/A  
  
Pacemaker Check: N/A  
  
Consults: GI 2001  
  
DM: No  
  
Cardiac Surgical prep: No  
  
SIGNATURE: Krystal Castellanos RN CHECKED BY:  
DATE of SERVICE: 2011  
TIME of SERVICE: 2:23 PM  
  
  
   
   
                                Anemia of chronic disease 2011  
   
                                Intestinal polyp 2011  
   
                                                      
  
  
Overview:Formatting of this note might be different from the original.  
Distal ileum ulcerated polyp.  
   
   
                                Nonspecific abnormal results of liver function s  
tudy 2006  
   
                                Impotence of organic origin 2006  
   
                                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  
                 2021  
  
documented as of this encounter (statuses as of 2022)  
[facility]06- History of Past illness Narrative*   
  
                                Problem         Noted Date      Resolved Date  
   
                                Medicare annual wellness visit, subsequent 2019  
   
                                Encounter for long-term (current) use of medicat  
ions 2018  
   
                                                      
  
  
Overview:Formatting of this note might be different from the original.  
Added automatically from request for surgery [id number]  
   
   
                                History of colonic polyps 2018  
   
                                                      
  
  
Overview:Formatting of this note might be different from the original.  
Added automatically from request for surgery [id number]  
   
   
                                Gastroesophageal reflux disease 2018  
   
                                                      
  
  
Overview:Formatting of this note might be different from the original.  
Added automatically from request for surgery [id number]  
   
   
                                Acute pulmonary edema 10/05/2011      10/06/2011  
   
                                                      
  
  
Overview:Formatting of this note might be different from the original.  
10/5/2011  
cardiogenic and noncardiogenic factors  
  
   
   
                                Atelectasis     10/05/2011      10/06/2011  
   
                                Hypotension     10/04/2011      10/06/2011  
   
                                                      
  
  
Overview:Formatting of this note might be different from the original.  
10/4/2011  
goal map 65-80  
   
   
                                Stress hyperglycemia 10/04/2011      10/06/2011  
   
                                                      
  
  
Overview:Formatting of this note might be different from the original.  
10/4/2011  
Perioperative insulin resistance and exacerbation of hyperglycemia.  
Control blood glucose with titration of insulin infusion  
  
10/5/2011  
adequate control  
goal FBG<180  
  
   
   
                                Post-op pain    10/04/2011      10/06/2011  
   
                                Mechanically assisted ventilation 10/04/2011      
  10/05/2011  
   
                                                      
  
  
Overview:Formatting of this note might be different from the original.  
10/4/2011  
optimize MV settings, WTE  
current setting:FiO2, 75%, peep 8  
   
   
                                Cardiac insufficiency 10/04/2011      10/05/2011  
   
                                                      
  
  
Overview:Formatting of this note might be different from the original.  
10/4/2011  
RV mild to moderate post pump  
goal CI>2.3  
   
   
                                Hypoxemia       10/04/2011      10/06/2011  
   
                                discharge planning 2011      10/19/2011  
   
                                                      
  
  
Overview:Formatting of this note might be different from the original.  
age 71,  lives in [place], [place]. No DC needs.  
/  
   
   
                                Pre-op testing  2011      10/04/2011  
   
                                                      
  
  
Overview:Formatting of this note is different from the original.  
Images from the original note were not included.  
[facility]  
PRE-OP CHECKLIST  
  
Surgeon: Deangelo Angulo M.D. Informed Consent Completed: No  
STS Score: 1.4%  
CAD: Yes - CAD on Problem List: Yes  
Is intended procedure a CABG: Yes - is a beta blocker ordered? Yes  
  
H & P completed: Yes  
  
PA/LAT: Completed CT: Completed MRI: N/A LE US: N/A  
  
Cath: Yes - reviewed: Yes Echo:Completed EKG: Completed EF %: 61  
  
PI's: N/A Carotid: Completed Mapping: N/A Dental: Completed PFT's: N/A  
  
Basename 11 0729  
WBC 7.18  
HB 13.1  
HCT 41.8  
  
INR 1.0  
CREAT 1.35  
  
UA: Normal  
HCG:N/A  
  
ABO/ABO Confirmed: Yes  
  
Blood ordered: No  
  
SA Swab: Yes - results: Pending  
  
Last Dose of Anticoagulation: vitamins  
  
Op Note: N/A  
  
Pacemaker Check: N/A  
  
Consults: GI 2001  
  
DM: No  
  
Cardiac Surgical prep: No  
  
SIGNATURE: Krystal Castellanos RN CHECKED BY:  
DATE of SERVICE: 2011  
TIME of SERVICE: 2:23 PM  
  
  
   
   
                                Anemia of chronic disease 2011  
   
                                Intestinal polyp 2011  
   
                                                      
  
  
Overview:Formatting of this note might be different from the original.  
Distal ileum ulcerated polyp.  
   
   
                                Nonspecific abnormal results of liver function s  
pepe 2006  
   
                                Impotence of organic origin 2006  
   
                                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  
                 2021  
  
documented as of this encounter (statuses as of 2022)  
[facility]06- History of Past illness Narrative*   
  
                                Problem         Noted Date      Resolved Date  
   
                                Medicare annual wellness visit, subsequent 2019  
   
                                Encounter for long-term (current) use of medicat  
ions 2018  
   
                                                      
  
  
Overview:Formatting of this note might be different from the original.  
Added automatically from request for surgery [id number]  
   
   
                                History of colonic polyps 2018  
   
                                                      
  
  
Overview:Formatting of this note might be different from the original.  
Added automatically from request for surgery [id number]  
   
   
                                Gastroesophageal reflux disease 2018  
   
                                                      
  
  
Overview:Formatting of this note might be different from the original.  
Added automatically from request for surgery [id number]  
   
   
                                Acute pulmonary edema 10/05/2011      10/06/2011  
   
                                                      
  
  
Overview:Formatting of this note might be different from the original.  
10/5/2011  
cardiogenic and noncardiogenic factors  
  
   
   
                                Atelectasis     10/05/2011      10/06/2011  
   
                                Hypotension     10/04/2011      10/06/2011  
   
                                                      
  
  
Overview:Formatting of this note might be different from the original.  
10/4/2011  
goal map 65-80  
   
   
                                Stress hyperglycemia 10/04/2011      10/06/2011  
   
                                                      
  
  
Overview:Formatting of this note might be different from the original.  
10/4/2011  
Perioperative insulin resistance and exacerbation of hyperglycemia.  
Control blood glucose with titration of insulin infusion  
  
10/5/2011  
adequate control  
goal FBG<180  
  
   
   
                                Post-op pain    10/04/2011      10/06/2011  
   
                                Mechanically assisted ventilation 10/04/2011      
  10/05/2011  
   
                                                      
  
  
Overview:Formatting of this note might be different from the original.  
10/4/2011  
optimize MV settings, WTE  
current setting:FiO2, 75%, peep 8  
   
   
                                Cardiac insufficiency 10/04/2011      10/05/2011  
   
                                                      
  
  
Overview:Formatting of this note might be different from the original.  
10/4/2011  
RV mild to moderate post pump  
goal CI>2.3  
   
   
                                Hypoxemia       10/04/2011      10/06/2011  
   
                                discharge planning 2011      10/19/2011  
   
                                                      
  
  
Overview:Formatting of this note might be different from the original.  
age 71,  lives in [place], [place]. No DC needs.  
/  
   
   
                                Pre-op testing  2011      10/04/2011  
   
                                                      
  
  
Overview:Formatting of this note is different from the original.  
Images from the original note were not included.  
[facility]  
PRE-OP CHECKLIST  
  
Surgeon: Deangelo Angulo M.D. Informed Consent Completed: No  
STS Score: 1.4%  
CAD: Yes - CAD on Problem List: Yes  
Is intended procedure a CABG: Yes - is a beta blocker ordered? Yes  
  
H & P completed: Yes  
  
PA/LAT: Completed CT: Completed MRI: N/A LE US: N/A  
  
Cath: Yes - reviewed: Yes Echo:Completed EKG: Completed EF %: 61  
  
PI's: N/A Carotid: Completed Mapping: N/A Dental: Completed PFT's: N/A  
  
Basename 11 0729  
WBC 7.18  
HB 13.1  
HCT 41.8  
  
INR 1.0  
CREAT 1.35  
  
UA: Normal  
HCG:N/A  
  
ABO/ABO Confirmed: Yes  
  
Blood ordered: No  
  
SA Swab: Yes - results: Pending  
  
Last Dose of Anticoagulation: vitamins  
  
Op Note: N/A  
  
Pacemaker Check: N/A  
  
Consults: GI 2001  
  
DM: No  
  
Cardiac Surgical prep: No  
  
SIGNATURE: Krystal Castellanos RN CHECKED BY:  
DATE of SERVICE: 2011  
TIME of SERVICE: 2:23 PM  
  
  
   
   
                                Anemia of chronic disease 2011  
   
                                Intestinal polyp 2011  
   
                                                      
  
  
Overview:Formatting of this note might be different from the original.  
Distal ileum ulcerated polyp.  
   
   
                                Nonspecific abnormal results of liver function lavon cantu 2006  
   
                                Impotence of organic origin 2006  
   
                                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  
                 2021  
  
documented as of this encounter (statuses as of 10/02/2022)  
[facility]06- History of Past illness Narrative*   
  
                                Problem         Noted Date      Resolved Date  
   
                                Medicare annual wellness visit, subsequent 2019  
   
                                Encounter for long-term (current) use of medicat  
ions 2018  
   
                                                      
  
  
Overview:Formatting of this note might be different from the original.  
Added automatically from request for surgery [id number]  
   
   
                                History of colonic polyps 2018  
   
                                                      
  
  
Overview:Formatting of this note might be different from the original.  
Added automatically from request for surgery [id number]  
   
   
                                Gastroesophageal reflux disease 2018  
   
                                                      
  
  
Overview:Formatting of this note might be different from the original.  
Added automatically from request for surgery [id number]  
   
   
                                Acute pulmonary edema 10/05/2011      10/06/2011  
   
                                                      
  
  
Overview:Formatting of this note might be different from the original.  
10/5/2011  
cardiogenic and noncardiogenic factors  
  
   
   
                                Atelectasis     10/05/2011      10/06/2011  
   
                                Hypotension     10/04/2011      10/06/2011  
   
                                                      
  
  
Overview:Formatting of this note might be different from the original.  
10/4/2011  
goal map 65-80  
   
   
                                Stress hyperglycemia 10/04/2011      10/06/2011  
   
                                                      
  
  
Overview:Formatting of this note might be different from the original.  
10/4/2011  
Perioperative insulin resistance and exacerbation of hyperglycemia.  
Control blood glucose with titration of insulin infusion  
  
10/5/2011  
adequate control  
goal FBG<180  
  
   
   
                                Post-op pain    10/04/2011      10/06/2011  
   
                                Mechanically assisted ventilation 10/04/2011      
  10/05/2011  
   
                                                      
  
  
Overview:Formatting of this note might be different from the original.  
10/4/2011  
optimize MV settings, WTE  
current setting:FiO2, 75%, peep 8  
   
   
                                Cardiac insufficiency 10/04/2011      10/05/2011  
   
                                                      
  
  
Overview:Formatting of this note might be different from the original.  
10/4/2011  
RV mild to moderate post pump  
goal CI>2.3  
   
   
                                Hypoxemia       10/04/2011      10/06/2011  
   
                                discharge planning 2011      10/19/2011  
   
                                                      
  
  
Overview:Formatting of this note might be different from the original.  
age 71,  lives in [place], [place]. No DC needs.  
/  
   
   
                                Pre-op testing  2011      10/04/2011  
   
                                                      
  
  
Overview:Formatting of this note is different from the original.  
Images from the original note were not included.  
[facility]  
PRE-OP CHECKLIST  
  
Surgeon: Deangelo Angulo M.D. Informed Consent Completed: No  
STS Score: 1.4%  
CAD: Yes - CAD on Problem List: Yes  
Is intended procedure a CABG: Yes - is a beta blocker ordered? Yes  
  
H & P completed: Yes  
  
PA/LAT: Completed CT: Completed MRI: N/A LE US: N/A  
  
Cath: Yes - reviewed: Yes Echo:Completed EKG: Completed EF %: 61  
  
PI's: N/A Carotid: Completed Mapping: N/A Dental: Completed PFT's: N/A  
  
Basename 11 0729  
WBC 7.18  
HB 13.1  
HCT 41.8  
  
INR 1.0  
CREAT 1.35  
  
UA: Normal  
HCG:N/A  
  
ABO/ABO Confirmed: Yes  
  
Blood ordered: No  
  
SA Swab: Yes - results: Pending  
  
Last Dose of Anticoagulation: vitamins  
  
Op Note: N/A  
  
Pacemaker Check: N/A  
  
Consults: GI 2001  
  
DM: No  
  
Cardiac Surgical prep: No  
  
SIGNATURE: Krystal Castellanos RN CHECKED BY:  
DATE of SERVICE: 2011  
TIME of SERVICE: 2:23 PM  
  
  
   
   
                                Anemia of chronic disease 2011  
   
                                Intestinal polyp 2011  
   
                                                      
  
  
Overview:Formatting of this note might be different from the original.  
Distal ileum ulcerated polyp.  
   
   
                                Nonspecific abnormal results of liver function s  
nialldy 2006  
   
                                Impotence of organic origin 2006  
   
                                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  
                 2021  
  
documented as of this encounter (statuses as of 10/03/2022)  
[facility]- History of Past illness Narrative*   
  
                                Problem         Noted Date      Resolved Date  
   
                                Medicare annual wellness visit, subsequent 2019  
   
                                Encounter for long-term (current) use of medicat  
ions 2018  
   
                                                      
  
  
Overview:Formatting of this note might be different from the original.  
Added automatically from request for surgery [id number]  
   
   
                                History of colonic polyps 2018  
   
                                                      
  
  
Overview:Formatting of this note might be different from the original.  
Added automatically from request for surgery [id number]  
   
   
                                Gastroesophageal reflux disease 2018  
   
                                                      
  
  
Overview:Formatting of this note might be different from the original.  
Added automatically from request for surgery [id number]  
   
   
                                Acute pulmonary edema 10/05/2011      10/06/2011  
   
                                                      
  
  
Overview:Formatting of this note might be different from the original.  
10/5/2011  
cardiogenic and noncardiogenic factors  
  
   
   
                                Atelectasis     10/05/2011      10/06/2011  
   
                                Hypotension     10/04/2011      10/06/2011  
   
                                                      
  
  
Overview:Formatting of this note might be different from the original.  
10/4/2011  
goal map 65-80  
   
   
                                Stress hyperglycemia 10/04/2011      10/06/2011  
   
                                                      
  
  
Overview:Formatting of this note might be different from the original.  
10/4/2011  
Perioperative insulin resistance and exacerbation of hyperglycemia.  
Control blood glucose with titration of insulin infusion  
  
10/5/2011  
adequate control  
goal FBG<180  
  
   
   
                                Post-op pain    10/04/2011      10/06/2011  
   
                                Mechanically assisted ventilation 10/04/2011      
  10/05/2011  
   
                                                      
  
  
Overview:Formatting of this note might be different from the original.  
10/4/2011  
optimize MV settings, WTE  
current setting:FiO2, 75%, peep 8  
   
   
                                Cardiac insufficiency 10/04/2011      10/05/2011  
   
                                                      
  
  
Overview:Formatting of this note might be different from the original.  
10/4/2011  
RV mild to moderate post pump  
goal CI>2.3  
   
   
                                Hypoxemia       10/04/2011      10/06/2011  
   
                                discharge planning 2011      10/19/2011  
   
                                                      
  
  
Overview:Formatting of this note might be different from the original.  
age 71,  lives in [place], [place]. No DC needs.  
/  
   
   
                                Pre-op testing  2011      10/04/2011  
   
                                                      
  
  
Overview:Formatting of this note is different from the original.  
Images from the original note were not included.  
[facility]  
PRE-OP CHECKLIST  
  
Surgeon: Deangelo Angulo M.D. Informed Consent Completed: No  
STS Score: 1.4%  
CAD: Yes - CAD on Problem List: Yes  
Is intended procedure a CABG: Yes - is a beta blocker ordered? Yes  
  
H & P completed: Yes  
  
PA/LAT: Completed CT: Completed MRI: N/A LE US: N/A  
  
Cath: Yes - reviewed: Yes Echo:Completed EKG: Completed EF %: 61  
  
PI's: N/A Carotid: Completed Mapping: N/A Dental: Completed PFT's: N/A  
  
Basename 11 0729  
WBC 7.18  
HB 13.1  
HCT 41.8  
  
INR 1.0  
CREAT 1.35  
  
UA: Normal  
HCG:N/A  
  
ABO/ABO Confirmed: Yes  
  
Blood ordered: No  
  
SA Swab: Yes - results: Pending  
  
Last Dose of Anticoagulation: vitamins  
  
Op Note: N/A  
  
Pacemaker Check: N/A  
  
Consults: GI 2001  
  
DM: No  
  
Cardiac Surgical prep: No  
  
SIGNATURE: Krystal Castellanos RN CHECKED BY:  
DATE of SERVICE: 2011  
TIME of SERVICE: 2:23 PM  
  
  
   
   
                                Anemia of chronic disease 2011  
   
                                Intestinal polyp 2011  
   
                                                      
  
  
Overview:Formatting of this note might be different from the original.  
Distal ileum ulcerated polyp.  
   
   
                                Nonspecific abnormal results of liver function s  
tudy 2006  
   
                                Impotence of organic origin 2006  
   
                                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  
                 2021  
  
documented as of this encounter (statuses as of 10/04/2022)  
[facility]- History of Past illness Narrative*   
  
                                Problem         Noted Date      Resolved Date  
   
                                Medicare annual wellness visit, subsequent 2019  
   
                                Encounter for long-term (current) use of medicat  
ions 2018  
   
                                                      
  
  
Overview:Formatting of this note might be different from the original.  
Added automatically from request for surgery [id number]  
   
   
                                History of colonic polyps 2018  
   
                                                      
  
  
Overview:Formatting of this note might be different from the original.  
Added automatically from request for surgery [id number]  
   
   
                                Gastroesophageal reflux disease 2018  
   
                                                      
  
  
Overview:Formatting of this note might be different from the original.  
Added automatically from request for surgery [id number]  
   
   
                                Acute pulmonary edema 10/05/2011      10/06/2011  
   
                                                      
  
  
Overview:Formatting of this note might be different from the original.  
10/5/2011  
cardiogenic and noncardiogenic factors  
  
   
   
                                Atelectasis     10/05/2011      10/06/2011  
   
                                Hypotension     10/04/2011      10/06/2011  
   
                                                      
  
  
Overview:Formatting of this note might be different from the original.  
10/4/2011  
goal map 65-80  
   
   
                                Stress hyperglycemia 10/04/2011      10/06/2011  
   
                                                      
  
  
Overview:Formatting of this note might be different from the original.  
10/4/2011  
Perioperative insulin resistance and exacerbation of hyperglycemia.  
Control blood glucose with titration of insulin infusion  
  
10/5/2011  
adequate control  
goal FBG<180  
  
   
   
                                Post-op pain    10/04/2011      10/06/2011  
   
                                Mechanically assisted ventilation 10/04/2011      
  10/05/2011  
   
                                                      
  
  
Overview:Formatting of this note might be different from the original.  
10/4/2011  
optimize MV settings, WTE  
current setting:FiO2, 75%, peep 8  
   
   
                                Cardiac insufficiency 10/04/2011      10/05/2011  
   
                                                      
  
  
Overview:Formatting of this note might be different from the original.  
10/4/2011  
RV mild to moderate post pump  
goal CI>2.3  
   
   
                                Hypoxemia       10/04/2011      10/06/2011  
   
                                discharge planning 2011      10/19/2011  
   
                                                      
  
  
Overview:Formatting of this note might be different from the original.  
age 71,  lives in [place], [place]. No DC needs.  
/  
   
   
                                Pre-op testing  2011      10/04/2011  
   
                                                      
  
  
Overview:Formatting of this note is different from the original.  
Images from the original note were not included.  
[facility]  
PRE-OP CHECKLIST  
  
Surgeon: Deangelo Angulo M.D. Informed Consent Completed: No  
STS Score: 1.4%  
CAD: Yes - CAD on Problem List: Yes  
Is intended procedure a CABG: Yes - is a beta blocker ordered? Yes  
  
H & P completed: Yes  
  
PA/LAT: Completed CT: Completed MRI: N/A LE US: N/A  
  
Cath: Yes - reviewed: Yes Echo:Completed EKG: Completed EF %: 61  
  
PI's: N/A Carotid: Completed Mapping: N/A Dental: Completed PFT's: N/A  
  
Basename 11 0729  
WBC 7.18  
HB 13.1  
HCT 41.8  
  
INR 1.0  
CREAT 1.35  
  
UA: Normal  
HCG:N/A  
  
ABO/ABO Confirmed: Yes  
  
Blood ordered: No  
  
SA Swab: Yes - results: Pending  
  
Last Dose of Anticoagulation: vitamins  
  
Op Note: N/A  
  
Pacemaker Check: N/A  
  
Consults: GI 2001  
  
DM: No  
  
Cardiac Surgical prep: No  
  
SIGNATURE: Krystal Castellanos RN CHECKED BY:  
DATE of SERVICE: 2011  
TIME of SERVICE: 2:23 PM  
  
  
   
   
                                Anemia of chronic disease 2011  
   
                                Intestinal polyp 2011  
   
                                                      
  
  
Overview:Formatting of this note might be different from the original.  
Distal ileum ulcerated polyp.  
   
   
                                Nonspecific abnormal results of liver function s  
tudy 2006  
   
                                Impotence of organic origin 2006  
   
                                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  
                 2021  
  
documented as of this encounter (statuses as of 10/05/2022)  
[facility]- History of Past illness Narrative*   
  
                                Problem         Noted Date      Resolved Date  
   
                                Medicare annual wellness visit, subsequent 2019  
   
                                Encounter for long-term (current) use of medicat  
ions 2018  
   
                                                      
  
  
Overview:Formatting of this note might be different from the original.  
Added automatically from request for surgery [id number]  
   
   
                                History of colonic polyps 2018  
   
                                                      
  
  
Overview:Formatting of this note might be different from the original.  
Added automatically from request for surgery [id number]  
   
   
                                Gastroesophageal reflux disease 2018  
   
                                                      
  
  
Overview:Formatting of this note might be different from the original.  
Added automatically from request for surgery [id number]  
   
   
                                Acute pulmonary edema 10/05/2011      10/06/2011  
   
                                                      
  
  
Overview:Formatting of this note might be different from the original.  
10/5/2011  
cardiogenic and noncardiogenic factors  
  
   
   
                                Atelectasis     10/05/2011      10/06/2011  
   
                                Hypotension     10/04/2011      10/06/2011  
   
                                                      
  
  
Overview:Formatting of this note might be different from the original.  
10/4/2011  
goal map 65-80  
   
   
                                Stress hyperglycemia 10/04/2011      10/06/2011  
   
                                                      
  
  
Overview:Formatting of this note might be different from the original.  
10/4/2011  
Perioperative insulin resistance and exacerbation of hyperglycemia.  
Control blood glucose with titration of insulin infusion  
  
10/5/2011  
adequate control  
goal FBG<180  
  
   
   
                                Post-op pain    10/04/2011      10/06/2011  
   
                                Mechanically assisted ventilation 10/04/2011      
  10/05/2011  
   
                                                      
  
  
Overview:Formatting of this note might be different from the original.  
10/4/2011  
optimize MV settings, WTE  
current setting:FiO2, 75%, peep 8  
   
   
                                Cardiac insufficiency 10/04/2011      10/05/2011  
   
                                                      
  
  
Overview:Formatting of this note might be different from the original.  
10/4/2011  
RV mild to moderate post pump  
goal CI>2.3  
   
   
                                Hypoxemia       10/04/2011      10/06/2011  
   
                                discharge planning 2011      10/19/2011  
   
                                                      
  
  
Overview:Formatting of this note might be different from the original.  
age 71,  lives in [place], [place]. No DC needs.  
/  
   
   
                                Pre-op testing  2011      10/04/2011  
   
                                                      
  
  
Overview:Formatting of this note is different from the original.  
Images from the original note were not included.  
[facility]  
PRE-OP CHECKLIST  
  
Surgeon: Deangelo Angulo M.D. Informed Consent Completed: No  
STS Score: 1.4%  
CAD: Yes - CAD on Problem List: Yes  
Is intended procedure a CABG: Yes - is a beta blocker ordered? Yes  
  
H & P completed: Yes  
  
PA/LAT: Completed CT: Completed MRI: N/A LE US: N/A  
  
Cath: Yes - reviewed: Yes Echo:Completed EKG: Completed EF %: 61  
  
PI's: N/A Carotid: Completed Mapping: N/A Dental: Completed PFT's: N/A  
  
Basename 11 0729  
WBC 7.18  
HB 13.1  
HCT 41.8  
  
INR 1.0  
CREAT 1.35  
  
UA: Normal  
HCG:N/A  
  
ABO/ABO Confirmed: Yes  
  
Blood ordered: No  
  
SA Swab: Yes - results: Pending  
  
Last Dose of Anticoagulation: vitamins  
  
Op Note: N/A  
  
Pacemaker Check: N/A  
  
Consults: GI 2001  
  
DM: No  
  
Cardiac Surgical prep: No  
  
SIGNATURE: Krystal Castellanos RN CHECKED BY:  
DATE of SERVICE: 2011  
TIME of SERVICE: 2:23 PM  
  
  
   
   
                                Anemia of chronic disease 2011  
   
                                Intestinal polyp 2011  
   
                                                      
  
  
Overview:Formatting of this note might be different from the original.  
Distal ileum ulcerated polyp.  
   
   
                                Nonspecific abnormal results of liver function s  
nialldy 2006  
   
                                Impotence of organic origin 2006  
   
                                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  
                 2021  
  
documented as of this encounter (statuses as of 10/07/2022)  
[facility]- History of Past illness Narrative*   
  
                                Problem         Noted Date      Resolved Date  
   
                                Medicare annual wellness visit, subsequent 2019  
   
                                Encounter for long-term (current) use of medicat  
ions 2018  
   
                                                      
  
  
Overview:Formatting of this note might be different from the original.  
Added automatically from request for surgery [id number]  
   
   
                                History of colonic polyps 2018  
   
                                                      
  
  
Overview:Formatting of this note might be different from the original.  
Added automatically from request for surgery [id number]  
   
   
                                Gastroesophageal reflux disease 2018  
   
                                                      
  
  
Overview:Formatting of this note might be different from the original.  
Added automatically from request for surgery [id number]  
   
   
                                Acute pulmonary edema 10/05/2011      10/06/2011  
   
                                                      
  
  
Overview:Formatting of this note might be different from the original.  
10/5/2011  
cardiogenic and noncardiogenic factors  
  
   
   
                                Atelectasis     10/05/2011      10/06/2011  
   
                                Hypotension     10/04/2011      10/06/2011  
   
                                                      
  
  
Overview:Formatting of this note might be different from the original.  
10/4/2011  
goal map 65-80  
   
   
                                Stress hyperglycemia 10/04/2011      10/06/2011  
   
                                                      
  
  
Overview:Formatting of this note might be different from the original.  
10/4/2011  
Perioperative insulin resistance and exacerbation of hyperglycemia.  
Control blood glucose with titration of insulin infusion  
  
10/5/2011  
adequate control  
goal FBG<180  
  
   
   
                                Post-op pain    10/04/2011      10/06/2011  
   
                                Mechanically assisted ventilation 10/04/2011      
  10/05/2011  
   
                                                      
  
  
Overview:Formatting of this note might be different from the original.  
10/4/2011  
optimize MV settings, WTE  
current setting:FiO2, 75%, peep 8  
   
   
                                Cardiac insufficiency 10/04/2011      10/05/2011  
   
                                                      
  
  
Overview:Formatting of this note might be different from the original.  
10/4/2011  
RV mild to moderate post pump  
goal CI>2.3  
   
   
                                Hypoxemia       10/04/2011      10/06/2011  
   
                                discharge planning 2011      10/19/2011  
   
                                                      
  
  
Overview:Formatting of this note might be different from the original.  
age 71,  lives in [place], [place]. No DC needs.  
/  
   
   
                                Pre-op testing  2011      10/04/2011  
   
                                                      
  
  
Overview:Formatting of this note is different from the original.  
Images from the original note were not included.  
[facility]  
PRE-OP CHECKLIST  
  
Surgeon: Deangelo Angulo M.D. Informed Consent Completed: No  
STS Score: 1.4%  
CAD: Yes - CAD on Problem List: Yes  
Is intended procedure a CABG: Yes - is a beta blocker ordered? Yes  
  
H & P completed: Yes  
  
PA/LAT: Completed CT: Completed MRI: N/A LE US: N/A  
  
Cath: Yes - reviewed: Yes Echo:Completed EKG: Completed EF %: 61  
  
PI's: N/A Carotid: Completed Mapping: N/A Dental: Completed PFT's: N/A  
  
Basename 11 0729  
WBC 7.18  
HB 13.1  
HCT 41.8  
  
INR 1.0  
CREAT 1.35  
  
UA: Normal  
HCG:N/A  
  
ABO/ABO Confirmed: Yes  
  
Blood ordered: No  
  
SA Swab: Yes - results: Pending  
  
Last Dose of Anticoagulation: vitamins  
  
Op Note: N/A  
  
Pacemaker Check: N/A  
  
Consults: GI 2001  
  
DM: No  
  
Cardiac Surgical prep: No  
  
SIGNATURE: Krystal Castellanos RN CHECKED BY:  
DATE of SERVICE: 2011  
TIME of SERVICE: 2:23 PM  
  
  
   
   
                                Anemia of chronic disease 2011  
   
                                Intestinal polyp 2011  
   
                                                      
  
  
Overview:Formatting of this note might be different from the original.  
Distal ileum ulcerated polyp.  
   
   
                                Nonspecific abnormal results of liver function s  
tudy 2006  
   
                                Impotence of organic origin 2006  
   
                                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  
                 2021  
  
documented as of this encounter (statuses as of 10/10/2022)  
[facility]06- History of Past illness Narrative*   
  
                                Problem         Noted Date      Resolved Date  
   
                                Medicare annual wellness visit, subsequent 2019  
   
                                Encounter for long-term (current) use of medicat  
ions 2018  
   
                                                      
  
  
Overview:Formatting of this note might be different from the original.  
Added automatically from request for surgery [id number]  
   
   
                                History of colonic polyps 2018  
   
                                                      
  
  
Overview:Formatting of this note might be different from the original.  
Added automatically from request for surgery [id number]  
   
   
                                Gastroesophageal reflux disease 2018  
   
                                                      
  
  
Overview:Formatting of this note might be different from the original.  
Added automatically from request for surgery [id number]  
   
   
                                Acute pulmonary edema 10/05/2011      10/06/2011  
   
                                                      
  
  
Overview:Formatting of this note might be different from the original.  
10/5/2011  
cardiogenic and noncardiogenic factors  
  
   
   
                                Atelectasis     10/05/2011      10/06/2011  
   
                                Hypotension     10/04/2011      10/06/2011  
   
                                                      
  
  
Overview:Formatting of this note might be different from the original.  
10/4/2011  
goal map 65-80  
   
   
                                Stress hyperglycemia 10/04/2011      10/06/2011  
   
                                                      
  
  
Overview:Formatting of this note might be different from the original.  
10/4/2011  
Perioperative insulin resistance and exacerbation of hyperglycemia.  
Control blood glucose with titration of insulin infusion  
  
10/5/2011  
adequate control  
goal FBG<180  
  
   
   
                                Post-op pain    10/04/2011      10/06/2011  
   
                                Mechanically assisted ventilation 10/04/2011      
  10/05/2011  
   
                                                      
  
  
Overview:Formatting of this note might be different from the original.  
10/4/2011  
optimize MV settings, WTE  
current setting:FiO2, 75%, peep 8  
   
   
                                Cardiac insufficiency 10/04/2011      10/05/2011  
   
                                                      
  
  
Overview:Formatting of this note might be different from the original.  
10/4/2011  
RV mild to moderate post pump  
goal CI>2.3  
   
   
                                Hypoxemia       10/04/2011      10/06/2011  
   
                                discharge planning 2011      10/19/2011  
   
                                                      
  
  
Overview:Formatting of this note might be different from the original.  
age 71,  lives in [place], [place]. No DC needs.  
/  
   
   
                                Pre-op testing  2011      10/04/2011  
   
                                                      
  
  
Overview:Formatting of this note is different from the original.  
Images from the original note were not included.  
[facility]  
PRE-OP CHECKLIST  
  
Surgeon: Deangelo Angulo M.D. Informed Consent Completed: No  
STS Score: 1.4%  
CAD: Yes - CAD on Problem List: Yes  
Is intended procedure a CABG: Yes - is a beta blocker ordered? Yes  
  
H & P completed: Yes  
  
PA/LAT: Completed CT: Completed MRI: N/A LE US: N/A  
  
Cath: Yes - reviewed: Yes Echo:Completed EKG: Completed EF %: 61  
  
PI's: N/A Carotid: Completed Mapping: N/A Dental: Completed PFT's: N/A  
  
Basename 11 0729  
WBC 7.18  
HB 13.1  
HCT 41.8  
  
INR 1.0  
CREAT 1.35  
  
UA: Normal  
HCG:N/A  
  
ABO/ABO Confirmed: Yes  
  
Blood ordered: No  
  
SA Swab: Yes - results: Pending  
  
Last Dose of Anticoagulation: vitamins  
  
Op Note: N/A  
  
Pacemaker Check: N/A  
  
Consults: GI 2001  
  
DM: No  
  
Cardiac Surgical prep: No  
  
SIGNATURE: Krystal Castellanos RN CHECKED BY:  
DATE of SERVICE: 2011  
TIME of SERVICE: 2:23 PM  
  
  
   
   
                                Anemia of chronic disease 2011  
   
                                Intestinal polyp 2011  
   
                                                      
  
  
Overview:Formatting of this note might be different from the original.  
Distal ileum ulcerated polyp.  
   
   
                                Nonspecific abnormal results of liver function s  
tudy 2006  
   
                                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  
                 2021  
  
documented as of this encounter (statuses as of 10/10/2022)  
[facility]06- History of Past illness Narrative*   
  
                                Problem         Noted Date      Resolved Date  
   
                                Medicare annual wellness visit, subsequent 2019  
   
                                Encounter for long-term (current) use of medicat  
ions 2018  
   
                                                      
  
  
Overview:Formatting of this note might be different from the original.  
Added automatically from request for surgery [id number]  
   
   
                                History of colonic polyps 2018  
   
                                                      
  
  
Overview:Formatting of this note might be different from the original.  
Added automatically from request for surgery [id number]  
   
   
                                Gastroesophageal reflux disease 2018  
   
                                                      
  
  
Overview:Formatting of this note might be different from the original.  
Added automatically from request for surgery [id number]  
   
   
                                Acute pulmonary edema 10/05/2011      10/06/2011  
   
                                                      
  
  
Overview:Formatting of this note might be different from the original.  
10/5/2011  
cardiogenic and noncardiogenic factors  
  
   
   
                                Atelectasis     10/05/2011      10/06/2011  
   
                                Hypotension     10/04/2011      10/06/2011  
   
                                                      
  
  
Overview:Formatting of this note might be different from the original.  
10/4/2011  
goal map 65-80  
   
   
                                Stress hyperglycemia 10/04/2011      10/06/2011  
   
                                                      
  
  
Overview:Formatting of this note might be different from the original.  
10/4/2011  
Perioperative insulin resistance and exacerbation of hyperglycemia.  
Control blood glucose with titration of insulin infusion  
  
10/5/2011  
adequate control  
goal FBG<180  
  
   
   
                                Post-op pain    10/04/2011      10/06/2011  
   
                                Mechanically assisted ventilation 10/04/2011      
  10/05/2011  
   
                                                      
  
  
Overview:Formatting of this note might be different from the original.  
10/4/2011  
optimize MV settings, WTE  
current setting:FiO2, 75%, peep 8  
   
   
                                Cardiac insufficiency 10/04/2011      10/05/2011  
   
                                                      
  
  
Overview:Formatting of this note might be different from the original.  
10/4/2011  
RV mild to moderate post pump  
goal CI>2.3  
   
   
                                Hypoxemia       10/04/2011      10/06/2011  
   
                                discharge planning 2011      10/19/2011  
   
                                                      
  
  
Overview:Formatting of this note might be different from the original.  
age 71,  lives in [place], [place]. No DC needs.  
/  
   
   
                                Pre-op testing  2011      10/04/2011  
   
                                                      
  
  
Overview:Formatting of this note is different from the original.  
Images from the original note were not included.  
[facility]  
PRE-OP CHECKLIST  
  
Surgeon: Deangelo Angulo M.D. Informed Consent Completed: No  
STS Score: 1.4%  
CAD: Yes - CAD on Problem List: Yes  
Is intended procedure a CABG: Yes - is a beta blocker ordered? Yes  
  
H & P completed: Yes  
  
PA/LAT: Completed CT: Completed MRI: N/A LE US: N/A  
  
Cath: Yes - reviewed: Yes Echo:Completed EKG: Completed EF %: 61  
  
PI's: N/A Carotid: Completed Mapping: N/A Dental: Completed PFT's: N/A  
  
Basename 11 0729  
WBC 7.18  
HB 13.1  
HCT 41.8  
  
INR 1.0  
CREAT 1.35  
  
UA: Normal  
HCG:N/A  
  
ABO/ABO Confirmed: Yes  
  
Blood ordered: No  
  
SA Swab: Yes - results: Pending  
  
Last Dose of Anticoagulation: vitamins  
  
Op Note: N/A  
  
Pacemaker Check: N/A  
  
Consults: GI 2001  
  
DM: No  
  
Cardiac Surgical prep: No  
  
SIGNATURE: Krystal Castellanos RN CHECKED BY:  
DATE of SERVICE: 2011  
TIME of SERVICE: 2:23 PM  
  
  
   
   
                                Anemia of chronic disease 2011  
   
                                Intestinal polyp 2011  
   
                                                      
  
  
Overview:Formatting of this note might be different from the original.  
Distal ileum ulcerated polyp.  
   
   
                                Nonspecific abnormal results of liver function s  
nialldy 2006  
   
                                Impotence of organic origin 2006      09/0  
2015  
   
                                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  
                 2021  
  
documented as of this encounter (statuses as of 10/12/2022)  
[facility]- History of Past illness Narrative*   
  
                                Problem         Noted Date      Resolved Date  
   
                                Medicare annual wellness visit, subsequent 2019  
   
                                Encounter for long-term (current) use of medicat  
ions 2018  
   
                                                      
  
  
Overview:Formatting of this note might be different from the original.  
Added automatically from request for surgery [id number]  
   
   
                                History of colonic polyps 2018  
   
                                                      
  
  
Overview:Formatting of this note might be different from the original.  
Added automatically from request for surgery [id number]  
   
   
                                Gastroesophageal reflux disease 2018  
   
                                                      
  
  
Overview:Formatting of this note might be different from the original.  
Added automatically from request for surgery [id number]  
   
   
                                Acute pulmonary edema 10/05/2011      10/06/2011  
   
                                                      
  
  
Overview:Formatting of this note might be different from the original.  
10/5/2011  
cardiogenic and noncardiogenic factors  
  
   
   
                                Atelectasis     10/05/2011      10/06/2011  
   
                                Hypotension     10/04/2011      10/06/2011  
   
                                                      
  
  
Overview:Formatting of this note might be different from the original.  
10/4/2011  
goal map 65-80  
   
   
                                Stress hyperglycemia 10/04/2011      10/06/2011  
   
                                                      
  
  
Overview:Formatting of this note might be different from the original.  
10/4/2011  
Perioperative insulin resistance and exacerbation of hyperglycemia.  
Control blood glucose with titration of insulin infusion  
  
10/5/2011  
adequate control  
goal FBG<180  
  
   
   
                                Post-op pain    10/04/2011      10/06/2011  
   
                                Mechanically assisted ventilation 10/04/2011      
  10/05/2011  
   
                                                      
  
  
Overview:Formatting of this note might be different from the original.  
10/4/2011  
optimize MV settings, WTE  
current setting:FiO2, 75%, peep 8  
   
   
                                Cardiac insufficiency 10/04/2011      10/05/2011  
   
                                                      
  
  
Overview:Formatting of this note might be different from the original.  
10/4/2011  
RV mild to moderate post pump  
goal CI>2.3  
   
   
                                Hypoxemia       10/04/2011      10/06/2011  
   
                                discharge planning 2011      10/19/2011  
   
                                                      
  
  
Overview:Formatting of this note might be different from the original.  
age 71,  lives in [place], [place]. No DC needs.  
/  
   
   
                                Pre-op testing  2011      10/04/2011  
   
                                                      
  
  
Overview:Formatting of this note is different from the original.  
Images from the original note were not included.  
[facility]  
PRE-OP CHECKLIST  
  
Surgeon: Deangelo Angulo M.D. Informed Consent Completed: No  
STS Score: 1.4%  
CAD: Yes - CAD on Problem List: Yes  
Is intended procedure a CABG: Yes - is a beta blocker ordered? Yes  
  
H & P completed: Yes  
  
PA/LAT: Completed CT: Completed MRI: N/A LE US: N/A  
  
Cath: Yes - reviewed: Yes Echo:Completed EKG: Completed EF %: 61  
  
PI's: N/A Carotid: Completed Mapping: N/A Dental: Completed PFT's: N/A  
  
Basename 11 0729  
WBC 7.18  
HB 13.1  
HCT 41.8  
  
INR 1.0  
CREAT 1.35  
  
UA: Normal  
HCG:N/A  
  
ABO/ABO Confirmed: Yes  
  
Blood ordered: No  
  
SA Swab: Yes - results: Pending  
  
Last Dose of Anticoagulation: vitamins  
  
Op Note: N/A  
  
Pacemaker Check: N/A  
  
Consults: GI 2001  
  
DM: No  
  
Cardiac Surgical prep: No  
  
SIGNATURE: Krystal Castellanos RN CHECKED BY:  
DATE of SERVICE: 2011  
TIME of SERVICE: 2:23 PM  
  
  
   
   
                                Anemia of chronic disease 2011  
   
                                Intestinal polyp 2011  
   
                                                      
  
  
Overview:Formatting of this note might be different from the original.  
Distal ileum ulcerated polyp.  
   
   
                                Nonspecific abnormal results of liver function s  
nialldy 2006  
   
                                Impotence of organic origin 2006  
   
                                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  
                 2021  
  
documented as of this encounter (statuses as of 10/17/2022)  
[facility]- History of Past illness Narrative*   
  
                                Problem         Noted Date      Resolved Date  
   
                                Medicare annual wellness visit, subsequent 2019  
   
                                Encounter for long-term (current) use of medicat  
ions 2018  
   
                                                      
  
  
Overview:Formatting of this note might be different from the original.  
Added automatically from request for surgery [id number]  
   
   
                                History of colonic polyps 2018  
   
                                                      
  
  
Overview:Formatting of this note might be different from the original.  
Added automatically from request for surgery [id number]  
   
   
                                Gastroesophageal reflux disease 2018  
   
                                                      
  
  
Overview:Formatting of this note might be different from the original.  
Added automatically from request for surgery [id number]  
   
   
                                Acute pulmonary edema 10/05/2011      10/06/2011  
   
                                                      
  
  
Overview:Formatting of this note might be different from the original.  
10/5/2011  
cardiogenic and noncardiogenic factors  
  
   
   
                                Atelectasis     10/05/2011      10/06/2011  
   
                                Hypotension     10/04/2011      10/06/2011  
   
                                                      
  
  
Overview:Formatting of this note might be different from the original.  
10/4/2011  
goal map 65-80  
   
   
                                Stress hyperglycemia 10/04/2011      10/06/2011  
   
                                                      
  
  
Overview:Formatting of this note might be different from the original.  
10/4/2011  
Perioperative insulin resistance and exacerbation of hyperglycemia.  
Control blood glucose with titration of insulin infusion  
  
10/5/2011  
adequate control  
goal FBG<180  
  
   
   
                                Post-op pain    10/04/2011      10/06/2011  
   
                                Mechanically assisted ventilation 10/04/2011      
  10/05/2011  
   
                                                      
  
  
Overview:Formatting of this note might be different from the original.  
10/4/2011  
optimize MV settings, WTE  
current setting:FiO2, 75%, peep 8  
   
   
                                Cardiac insufficiency 10/04/2011      10/05/2011  
   
                                                      
  
  
Overview:Formatting of this note might be different from the original.  
10/4/2011  
RV mild to moderate post pump  
goal CI>2.3  
   
   
                                Hypoxemia       10/04/2011      10/06/2011  
   
                                discharge planning 2011      10/19/2011  
   
                                                      
  
  
Overview:Formatting of this note might be different from the original.  
age 71,  lives in [place], [place]. No DC needs.  
/  
   
   
                                Pre-op testing  2011      10/04/2011  
   
                                                      
  
  
Overview:Formatting of this note is different from the original.  
Images from the original note were not included.  
[facility]  
PRE-OP CHECKLIST  
  
Surgeon: Deangelo Angulo M.D. Informed Consent Completed: No  
STS Score: 1.4%  
CAD: Yes - CAD on Problem List: Yes  
Is intended procedure a CABG: Yes - is a beta blocker ordered? Yes  
  
H & P completed: Yes  
  
PA/LAT: Completed CT: Completed MRI: N/A LE US: N/A  
  
Cath: Yes - reviewed: Yes Echo:Completed EKG: Completed EF %: 61  
  
PI's: N/A Carotid: Completed Mapping: N/A Dental: Completed PFT's: N/A  
  
Basename 11 0729  
WBC 7.18  
HB 13.1  
HCT 41.8  
  
INR 1.0  
CREAT 1.35  
  
UA: Normal  
HCG:N/A  
  
ABO/ABO Confirmed: Yes  
  
Blood ordered: No  
  
SA Swab: Yes - results: Pending  
  
Last Dose of Anticoagulation: vitamins  
  
Op Note: N/A  
  
Pacemaker Check: N/A  
  
Consults: GI 2001  
  
DM: No  
  
Cardiac Surgical prep: No  
  
SIGNATURE: Krystal Castellanos RN CHECKED BY:  
DATE of SERVICE: 2011  
TIME of SERVICE: 2:23 PM  
  
  
   
   
                                Anemia of chronic disease 2011  
   
                                Intestinal polyp 2011  
   
                                                      
  
  
Overview:Formatting of this note might be different from the original.  
Distal ileum ulcerated polyp.  
   
   
                                Nonspecific abnormal results of liver function s  
tudy 2006  
   
                                Impotence of organic origin 2006  
   
                                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  
                 2021  
  
documented as of this encounter (statuses as of 2022)  
[facility]- History of Past illness Narrative*   
  
                                Problem         Noted Date      Resolved Date  
   
                                Medicare annual wellness visit, subsequent 2019  
   
                                Encounter for long-term (current) use of medicat  
ions 2018  
   
                                                      
  
  
Overview:Formatting of this note might be different from the original.  
Added automatically from request for surgery [id number]  
   
   
                                History of colonic polyps 2018  
   
                                                      
  
  
Overview:Formatting of this note might be different from the original.  
Added automatically from request for surgery [id number]  
   
   
                                Gastroesophageal reflux disease 2018  
   
                                                      
  
  
Overview:Formatting of this note might be different from the original.  
Added automatically from request for surgery [id number]  
   
   
                                Acute pulmonary edema 10/05/2011      10/06/2011  
   
                                                      
  
  
Overview:Formatting of this note might be different from the original.  
10/5/2011  
cardiogenic and noncardiogenic factors  
  
   
   
                                Atelectasis     10/05/2011      10/06/2011  
   
                                Hypotension     10/04/2011      10/06/2011  
   
                                                      
  
  
Overview:Formatting of this note might be different from the original.  
10/4/2011  
goal map 65-80  
   
   
                                Stress hyperglycemia 10/04/2011      10/06/2011  
   
                                                      
  
  
Overview:Formatting of this note might be different from the original.  
10/4/2011  
Perioperative insulin resistance and exacerbation of hyperglycemia.  
Control blood glucose with titration of insulin infusion  
  
10/5/2011  
adequate control  
goal FBG<180  
  
   
   
                                Post-op pain    10/04/2011      10/06/2011  
   
                                Mechanically assisted ventilation 10/04/2011      
  10/05/2011  
   
                                                      
  
  
Overview:Formatting of this note might be different from the original.  
10/4/2011  
optimize MV settings, WTE  
current setting:FiO2, 75%, peep 8  
   
   
                                Cardiac insufficiency 10/04/2011      10/05/2011  
   
                                                      
  
  
Overview:Formatting of this note might be different from the original.  
10/4/2011  
RV mild to moderate post pump  
goal CI>2.3  
   
   
                                Hypoxemia       10/04/2011      10/06/2011  
   
                                discharge planning 2011      10/19/2011  
   
                                                      
  
  
Overview:Formatting of this note might be different from the original.  
age 71,  lives in [place], [place]. No DC needs.  
/  
   
   
                                Pre-op testing  2011      10/04/2011  
   
                                                      
  
  
Overview:Formatting of this note is different from the original.  
Images from the original note were not included.  
[facility]  
PRE-OP CHECKLIST  
  
Surgeon: Deangelo Angulo M.D. Informed Consent Completed: No  
STS Score: 1.4%  
CAD: Yes - CAD on Problem List: Yes  
Is intended procedure a CABG: Yes - is a beta blocker ordered? Yes  
  
H & P completed: Yes  
  
PA/LAT: Completed CT: Completed MRI: N/A LE US: N/A  
  
Cath: Yes - reviewed: Yes Echo:Completed EKG: Completed EF %: 61  
  
PI's: N/A Carotid: Completed Mapping: N/A Dental: Completed PFT's: N/A  
  
Basename 11 0729  
WBC 7.18  
HB 13.1  
HCT 41.8  
  
INR 1.0  
CREAT 1.35  
  
UA: Normal  
HCG:N/A  
  
ABO/ABO Confirmed: Yes  
  
Blood ordered: No  
  
SA Swab: Yes - results: Pending  
  
Last Dose of Anticoagulation: vitamins  
  
Op Note: N/A  
  
Pacemaker Check: N/A  
  
Consults: GI 2001  
  
DM: No  
  
Cardiac Surgical prep: No  
  
SIGNATURE: Krystal Castellanos RN CHECKED BY:  
DATE of SERVICE: 2011  
TIME of SERVICE: 2:23 PM  
  
  
   
   
                                Anemia of chronic disease 2011  
   
                                Intestinal polyp 2011  
   
                                                      
  
  
Overview:Formatting of this note might be different from the original.  
Distal ileum ulcerated polyp.  
   
   
                                Nonspecific abnormal results of liver function s  
tudy 2006  
   
                                Impotence of organic origin 2006  
   
                                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  
                 2021  
  
documented as of this encounter (statuses as of 2022)  
[facility]06- History of Past illness Narrative*   
  
                                Problem         Noted Date      Resolved Date  
   
                                Medicare annual wellness visit, subsequent 2019  
   
                                Encounter for long-term (current) use of medicat  
ions 2018  
   
                                                      
  
  
Overview:Formatting of this note might be different from the original.  
Added automatically from request for surgery [id number]  
   
   
                                History of colonic polyps 2018  
   
                                                      
  
  
Overview:Formatting of this note might be different from the original.  
Added automatically from request for surgery [id number]  
   
   
                                Gastroesophageal reflux disease 2018  
   
                                                      
  
  
Overview:Formatting of this note might be different from the original.  
Added automatically from request for surgery [id number]  
   
   
                                Acute pulmonary edema 10/05/2011      10/06/2011  
   
                                                      
  
  
Overview:Formatting of this note might be different from the original.  
10/5/2011  
cardiogenic and noncardiogenic factors  
  
   
   
                                Atelectasis     10/05/2011      10/06/2011  
   
                                Hypotension     10/04/2011      10/06/2011  
   
                                                      
  
  
Overview:Formatting of this note might be different from the original.  
10/4/2011  
goal map 65-80  
   
   
                                Stress hyperglycemia 10/04/2011      10/06/2011  
   
                                                      
  
  
Overview:Formatting of this note might be different from the original.  
10/4/2011  
Perioperative insulin resistance and exacerbation of hyperglycemia.  
Control blood glucose with titration of insulin infusion  
  
10/5/2011  
adequate control  
goal FBG<180  
  
   
   
                                Post-op pain    10/04/2011      10/06/2011  
   
                                Mechanically assisted ventilation 10/04/2011      
  10/05/2011  
   
                                                      
  
  
Overview:Formatting of this note might be different from the original.  
10/4/2011  
optimize MV settings, WTE  
current setting:FiO2, 75%, peep 8  
   
   
                                Cardiac insufficiency 10/04/2011      10/05/2011  
   
                                                      
  
  
Overview:Formatting of this note might be different from the original.  
10/4/2011  
RV mild to moderate post pump  
goal CI>2.3  
   
   
                                Hypoxemia       10/04/2011      10/06/2011  
   
                                discharge planning 2011      10/19/2011  
   
                                                      
  
  
Overview:Formatting of this note might be different from the original.  
age 71,  lives in [place], [place]. No DC needs.  
/  
   
   
                                Pre-op testing  2011      10/04/2011  
   
                                                      
  
  
Overview:Formatting of this note is different from the original.  
Images from the original note were not included.  
[facility]  
PRE-OP CHECKLIST  
  
Surgeon: Deangelo Angulo M.D. Informed Consent Completed: No  
STS Score: 1.4%  
CAD: Yes - CAD on Problem List: Yes  
Is intended procedure a CABG: Yes - is a beta blocker ordered? Yes  
  
H & P completed: Yes  
  
PA/LAT: Completed CT: Completed MRI: N/A LE US: N/A  
  
Cath: Yes - reviewed: Yes Echo:Completed EKG: Completed EF %: 61  
  
PI's: N/A Carotid: Completed Mapping: N/A Dental: Completed PFT's: N/A  
  
Basename 11 0729  
WBC 7.18  
HB 13.1  
HCT 41.8  
  
INR 1.0  
CREAT 1.35  
  
UA: Normal  
HCG:N/A  
  
ABO/ABO Confirmed: Yes  
  
Blood ordered: No  
  
SA Swab: Yes - results: Pending  
  
Last Dose of Anticoagulation: vitamins  
  
Op Note: N/A  
  
Pacemaker Check: N/A  
  
Consults: GI 2001  
  
DM: No  
  
Cardiac Surgical prep: No  
  
SIGNATURE: Krystal Castellanos RN CHECKED BY:  
DATE of SERVICE: 2011  
TIME of SERVICE: 2:23 PM  
  
  
   
   
                                Anemia of chronic disease 2011  
   
                                Intestinal polyp 2011  
   
                                                      
  
  
Overview:Formatting of this note might be different from the original.  
Distal ileum ulcerated polyp.  
   
   
                                Nonspecific abnormal results of liver function s  
tudy 2006  
   
                                Impotence of organic origin 2006  
   
                                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  
                 2021  
  
documented as of this encounter (statuses as of 2022)  
[facility]- History of Past illness Narrative*   
  
                                Problem         Noted Date      Resolved Date  
   
                                Medicare annual wellness visit, subsequent 2019  
   
                                Encounter for long-term (current) use of medicat  
ions 2018  
   
                                                      
  
  
Overview:Formatting of this note might be different from the original.  
Added automatically from request for surgery [id number]  
   
   
                                History of colonic polyps 2018  
   
                                                      
  
  
Overview:Formatting of this note might be different from the original.  
Added automatically from request for surgery [id number]  
   
   
                                Gastroesophageal reflux disease 2018  
   
                                                      
  
  
Overview:Formatting of this note might be different from the original.  
Added automatically from request for surgery [id number]  
   
   
                                Acute pulmonary edema 10/05/2011      10/06/2011  
   
                                                      
  
  
Overview:Formatting of this note might be different from the original.  
10/5/2011  
cardiogenic and noncardiogenic factors  
  
   
   
                                Atelectasis     10/05/2011      10/06/2011  
   
                                Hypotension     10/04/2011      10/06/2011  
   
                                                      
  
  
Overview:Formatting of this note might be different from the original.  
10/4/2011  
goal map 65-80  
   
   
                                Stress hyperglycemia 10/04/2011      10/06/2011  
   
                                                      
  
  
Overview:Formatting of this note might be different from the original.  
10/4/2011  
Perioperative insulin resistance and exacerbation of hyperglycemia.  
Control blood glucose with titration of insulin infusion  
  
10/5/2011  
adequate control  
goal FBG<180  
  
   
   
                                Post-op pain    10/04/2011      10/06/2011  
   
                                Mechanically assisted ventilation 10/04/2011      
  10/05/2011  
   
                                                      
  
  
Overview:Formatting of this note might be different from the original.  
10/4/2011  
optimize MV settings, WTE  
current setting:FiO2, 75%, peep 8  
   
   
                                Cardiac insufficiency 10/04/2011      10/05/2011  
   
                                                      
  
  
Overview:Formatting of this note might be different from the original.  
10/4/2011  
RV mild to moderate post pump  
goal CI>2.3  
   
   
                                Hypoxemia       10/04/2011      10/06/2011  
   
                                discharge planning 2011      10/19/2011  
   
                                                      
  
  
Overview:Formatting of this note might be different from the original.  
age 71,  lives in [place], [place]. No DC needs.  
/  
   
   
                                Pre-op testing  2011      10/04/2011  
   
                                                      
  
  
Overview:Formatting of this note is different from the original.  
Images from the original note were not included.  
[facility]  
PRE-OP CHECKLIST  
  
Surgeon: Deangelo R. Adele, M.D. Informed Consent Completed: No  
STS Score: 1.4%  
CAD: Yes - CAD on Problem List: Yes  
Is intended procedure a CABG: Yes - is a beta blocker ordered? Yes  
  
H & P completed: Yes  
  
PA/LAT: Completed CT: Completed MRI: N/A LE US: N/A  
  
Cath: Yes - reviewed: Yes Echo:Completed EKG: Completed EF %: 61  
  
PI's: N/A Carotid: Completed Mapping: N/A Dental: Completed PFT's: N/A  
  
Basename 11 0729  
WBC 7.18  
HB 13.1  
HCT 41.8  
  
INR 1.0  
CREAT 1.35  
  
UA: Normal  
HCG:N/A  
  
ABO/ABO Confirmed: Yes  
  
Blood ordered: No  
  
SA Swab: Yes - results: Pending  
  
Last Dose of Anticoagulation: vitamins  
  
Op Note: N/A  
  
Pacemaker Check: N/A  
  
Consults: GI 2001  
  
DM: No  
  
Cardiac Surgical prep: No  
  
SIGNATURE: Krystal Castellanos RN CHECKED BY:  
DATE of SERVICE: 2011  
TIME of SERVICE: 2:23 PM  
  
  
   
   
                                Anemia of chronic disease 2011  
   
                                Intestinal polyp 2011  
   
                                                      
  
  
Overview:Formatting of this note might be different from the original.  
Distal ileum ulcerated polyp.  
   
   
                                Nonspecific abnormal results of liver function s  
tudy 2006  
   
                                Impotence of organic origin 2006  
   
                                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  
                 2021  
  
documented as of this encounter (statuses as of 2022)  
[facility]- History of Past illness Narrative*   
  
                                Problem         Noted Date      Resolved Date  
   
                                Medicare annual wellness visit, subsequent 2019  
   
                                Encounter for long-term (current) use of medicat  
ions 2018  
   
                                                      
  
  
Overview:Formatting of this note might be different from the original.  
Added automatically from request for surgery [id number]  
   
   
                                History of colonic polyps 2018  
   
                                                      
  
  
Overview:Formatting of this note might be different from the original.  
Added automatically from request for surgery [id number]  
   
   
                                Gastroesophageal reflux disease 2018  
   
                                                      
  
  
Overview:Formatting of this note might be different from the original.  
Added automatically from request for surgery [id number]  
   
   
                                Acute pulmonary edema 10/05/2011      10/06/2011  
   
                                                      
  
  
Overview:Formatting of this note might be different from the original.  
10/5/2011  
cardiogenic and noncardiogenic factors  
  
   
   
                                Atelectasis     10/05/2011      10/06/2011  
   
                                Hypotension     10/04/2011      10/06/2011  
   
                                                      
  
  
Overview:Formatting of this note might be different from the original.  
10/4/2011  
goal map 65-80  
   
   
                                Stress hyperglycemia 10/04/2011      10/06/2011  
   
                                                      
  
  
Overview:Formatting of this note might be different from the original.  
10/4/2011  
Perioperative insulin resistance and exacerbation of hyperglycemia.  
Control blood glucose with titration of insulin infusion  
  
10/5/2011  
adequate control  
goal FBG<180  
  
   
   
                                Post-op pain    10/04/2011      10/06/2011  
   
                                Mechanically assisted ventilation 10/04/2011      
  10/05/2011  
   
                                                      
  
  
Overview:Formatting of this note might be different from the original.  
10/4/2011  
optimize MV settings, WTE  
current setting:FiO2, 75%, peep 8  
   
   
                                Cardiac insufficiency 10/04/2011      10/05/2011  
   
                                                      
  
  
Overview:Formatting of this note might be different from the original.  
10/4/2011  
RV mild to moderate post pump  
goal CI>2.3  
   
   
                                Hypoxemia       10/04/2011      10/06/2011  
   
                                discharge planning 2011      10/19/2011  
   
                                                      
  
  
Overview:Formatting of this note might be different from the original.  
age 71,  lives in [place], [place]. No DC needs.  
/  
   
   
                                Pre-op testing  2011      10/04/2011  
   
                                                      
  
  
Overview:Formatting of this note is different from the original.  
Images from the original note were not included.  
[facility]  
PRE-OP CHECKLIST  
  
Surgeon: Deangelo Angulo M.D. Informed Consent Completed: No  
STS Score: 1.4%  
CAD: Yes - CAD on Problem List: Yes  
Is intended procedure a CABG: Yes - is a beta blocker ordered? Yes  
  
H & P completed: Yes  
  
PA/LAT: Completed CT: Completed MRI: N/A LE US: N/A  
  
Cath: Yes - reviewed: Yes Echo:Completed EKG: Completed EF %: 61  
  
PI's: N/A Carotid: Completed Mapping: N/A Dental: Completed PFT's: N/A  
  
Basename 11 0729  
WBC 7.18  
HB 13.1  
HCT 41.8  
  
INR 1.0  
CREAT 1.35  
  
UA: Normal  
HCG:N/A  
  
ABO/ABO Confirmed: Yes  
  
Blood ordered: No  
  
SA Swab: Yes - results: Pending  
  
Last Dose of Anticoagulation: vitamins  
  
Op Note: N/A  
  
Pacemaker Check: N/A  
  
Consults: GI 2001  
  
DM: No  
  
Cardiac Surgical prep: No  
  
SIGNATURE: Krystal Castellanos RN CHECKED BY:  
DATE of SERVICE: 2011  
TIME of SERVICE: 2:23 PM  
  
  
   
   
                                Anemia of chronic disease 2011  
   
                                Intestinal polyp 2011  
   
                                                      
  
  
Overview:Formatting of this note might be different from the original.  
Distal ileum ulcerated polyp.  
   
   
                                Nonspecific abnormal results of liver function s  
tudy 2006  
   
                                Impotence of organic origin 2006  
   
                                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  
                 2021  
  
documented as of this encounter (statuses as of 2022)  
[facility]- History of Past illness Narrative*   
  
                                Problem         Noted Date      Resolved Date  
   
                                Medicare annual wellness visit, subsequent 2019  
   
                                Encounter for long-term (current) use of medicat  
ions 2018  
   
                                                      
  
  
Overview:Formatting of this note might be different from the original.  
Added automatically from request for surgery [id number]  
   
   
                                History of colonic polyps 2018  
   
                                                      
  
  
Overview:Formatting of this note might be different from the original.  
Added automatically from request for surgery [id number]  
   
   
                                Gastroesophageal reflux disease 2018  
   
                                                      
  
  
Overview:Formatting of this note might be different from the original.  
Added automatically from request for surgery [id number]  
   
   
                                Acute pulmonary edema 10/05/2011      10/06/2011  
   
                                                      
  
  
Overview:Formatting of this note might be different from the original.  
10/5/2011  
cardiogenic and noncardiogenic factors  
  
   
   
                                Atelectasis     10/05/2011      10/06/2011  
   
                                Hypotension     10/04/2011      10/06/2011  
   
                                                      
  
  
Overview:Formatting of this note might be different from the original.  
10/4/2011  
goal map 65-80  
   
   
                                Stress hyperglycemia 10/04/2011      10/06/2011  
   
                                                      
  
  
Overview:Formatting of this note might be different from the original.  
10/4/2011  
Perioperative insulin resistance and exacerbation of hyperglycemia.  
Control blood glucose with titration of insulin infusion  
  
10/5/2011  
adequate control  
goal FBG<180  
  
   
   
                                Post-op pain    10/04/2011      10/06/2011  
   
                                Mechanically assisted ventilation 10/04/2011      
  10/05/2011  
   
                                                      
  
  
Overview:Formatting of this note might be different from the original.  
10/4/2011  
optimize MV settings, WTE  
current setting:FiO2, 75%, peep 8  
   
   
                                Cardiac insufficiency 10/04/2011      10/05/2011  
   
                                                      
  
  
Overview:Formatting of this note might be different from the original.  
10/4/2011  
RV mild to moderate post pump  
goal CI>2.3  
   
   
                                Hypoxemia       10/04/2011      10/06/2011  
   
                                discharge planning 2011      10/19/2011  
   
                                                      
  
  
Overview:Formatting of this note might be different from the original.  
age 71,  lives in [place], [place]. No DC needs.  
/  
   
   
                                Pre-op testing  2011      10/04/2011  
   
                                                      
  
  
Overview:Formatting of this note is different from the original.  
Images from the original note were not included.  
[facility]  
PRE-OP CHECKLIST  
  
Surgeon: Deangelo Angulo M.D. Informed Consent Completed: No  
STS Score: 1.4%  
CAD: Yes - CAD on Problem List: Yes  
Is intended procedure a CABG: Yes - is a beta blocker ordered? Yes  
  
H & P completed: Yes  
  
PA/LAT: Completed CT: Completed MRI: N/A LE US: N/A  
  
Cath: Yes - reviewed: Yes Echo:Completed EKG: Completed EF %: 61  
  
PI's: N/A Carotid: Completed Mapping: N/A Dental: Completed PFT's: N/A  
  
Basename 11 0729  
WBC 7.18  
HB 13.1  
HCT 41.8  
  
INR 1.0  
CREAT 1.35  
  
UA: Normal  
HCG:N/A  
  
ABO/ABO Confirmed: Yes  
  
Blood ordered: No  
  
SA Swab: Yes - results: Pending  
  
Last Dose of Anticoagulation: vitamins  
  
Op Note: N/A  
  
Pacemaker Check: N/A  
  
Consults: GI 2001  
  
DM: No  
  
Cardiac Surgical prep: No  
  
SIGNATURE: Krystal Castellanos RN CHECKED BY:  
DATE of SERVICE: 2011  
TIME of SERVICE: 2:23 PM  
  
  
   
   
                                Anemia of chronic disease 2011  
   
                                Intestinal polyp 2011  
   
                                                      
  
  
Overview:Formatting of this note might be different from the original.  
Distal ileum ulcerated polyp.  
   
   
                                Nonspecific abnormal results of liver function s  
tudy 2006  
   
                                Impotence of organic origin 2006  
   
                                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  
                 2021  
  
documented as of this encounter (statuses as of 2023)  
[facility]- History of Past illness Narrative*   
  
                                Problem         Noted Date      Resolved Date  
   
                                Medicare annual wellness visit, subsequent 2019  
   
                                Encounter for long-term (current) use of medicat  
ions 2018  
   
                                                      
  
  
Overview:Formatting of this note might be different from the original.  
Added automatically from request for surgery [id number]  
   
   
                                History of colonic polyps 2018  
   
                                                      
  
  
Overview:Formatting of this note might be different from the original.  
Added automatically from request for surgery [id number]  
   
   
                                Gastroesophageal reflux disease 2018  
   
                                                      
  
  
Overview:Formatting of this note might be different from the original.  
Added automatically from request for surgery [id number]  
   
   
                                Acute pulmonary edema 10/05/2011      10/06/2011  
   
                                                      
  
  
Overview:Formatting of this note might be different from the original.  
10/5/2011  
cardiogenic and noncardiogenic factors  
  
   
   
                                Atelectasis     10/05/2011      10/06/2011  
   
                                Hypotension     10/04/2011      10/06/2011  
   
                                                      
  
  
Overview:Formatting of this note might be different from the original.  
10/4/2011  
goal map 65-80  
   
   
                                Stress hyperglycemia 10/04/2011      10/06/2011  
   
                                                      
  
  
Overview:Formatting of this note might be different from the original.  
10/4/2011  
Perioperative insulin resistance and exacerbation of hyperglycemia.  
Control blood glucose with titration of insulin infusion  
  
10/5/2011  
adequate control  
goal FBG<180  
  
   
   
                                Post-op pain    10/04/2011      10/06/2011  
   
                                Mechanically assisted ventilation 10/04/2011      
  10/05/2011  
   
                                                      
  
  
Overview:Formatting of this note might be different from the original.  
10/4/2011  
optimize MV settings, WTE  
current setting:FiO2, 75%, peep 8  
   
   
                                Cardiac insufficiency 10/04/2011      10/05/2011  
   
                                                      
  
  
Overview:Formatting of this note might be different from the original.  
10/4/2011  
RV mild to moderate post pump  
goal CI>2.3  
   
   
                                Hypoxemia       10/04/2011      10/06/2011  
   
                                discharge planning 2011      10/19/2011  
   
                                                      
  
  
Overview:Formatting of this note might be different from the original.  
age 71,  lives in [place], [place]. No DC needs.  
/  
   
   
                                Pre-op testing  2011      10/04/2011  
   
                                                      
  
  
Overview:Formatting of this note is different from the original.  
Images from the original note were not included.  
[facility]  
PRE-OP CHECKLIST  
  
Surgeon: Deangelo Angulo M.D. Informed Consent Completed: No  
STS Score: 1.4%  
CAD: Yes - CAD on Problem List: Yes  
Is intended procedure a CABG: Yes - is a beta blocker ordered? Yes  
  
H & P completed: Yes  
  
PA/LAT: Completed CT: Completed MRI: N/A LE US: N/A  
  
Cath: Yes - reviewed: Yes Echo:Completed EKG: Completed EF %: 61  
  
PI's: N/A Carotid: Completed Mapping: N/A Dental: Completed PFT's: N/A  
  
Basename 11 0729  
WBC 7.18  
HB 13.1  
HCT 41.8  
  
INR 1.0  
CREAT 1.35  
  
UA: Normal  
HCG:N/A  
  
ABO/ABO Confirmed: Yes  
  
Blood ordered: No  
  
SA Swab: Yes - results: Pending  
  
Last Dose of Anticoagulation: vitamins  
  
Op Note: N/A  
  
Pacemaker Check: N/A  
  
Consults: GI 2001  
  
DM: No  
  
Cardiac Surgical prep: No  
  
SIGNATURE: Krystal Castellanos RN CHECKED BY:  
DATE of SERVICE: 2011  
TIME of SERVICE: 2:23 PM  
  
  
   
   
                                Anemia of chronic disease 2011  
   
                                Intestinal polyp 2011  
   
                                                      
  
  
Overview:Formatting of this note might be different from the original.  
Distal ileum ulcerated polyp.  
   
   
                                Nonspecific abnormal results of liver function s  
tudy 2006  
   
                                Impotence of organic origin 2006  
   
                                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  
                 2021  
  
documented as of this encounter (statuses as of 2023)  
[facility]06- History of Past illness Narrative*   
  
                                Problem         Noted Date      Resolved Date  
   
                                Medicare annual wellness visit, subsequent 2019  
   
                                Encounter for long-term (current) use of medicat  
ions 2018  
   
                                                      
  
  
Overview:Formatting of this note might be different from the original.  
Added automatically from request for surgery [id number]  
   
   
                                History of colonic polyps 2018  
   
                                                      
  
  
Overview:Formatting of this note might be different from the original.  
Added automatically from request for surgery [id number]  
   
   
                                Gastroesophageal reflux disease 2018  
   
                                                      
  
  
Overview:Formatting of this note might be different from the original.  
Added automatically from request for surgery [id number]  
   
   
                                Acute pulmonary edema 10/05/2011      10/06/2011  
   
                                                      
  
  
Overview:Formatting of this note might be different from the original.  
10/5/2011  
cardiogenic and noncardiogenic factors  
  
   
   
                                Atelectasis     10/05/2011      10/06/2011  
   
                                Hypotension     10/04/2011      10/06/2011  
   
                                                      
  
  
Overview:Formatting of this note might be different from the original.  
10/4/2011  
goal map 65-80  
   
   
                                Stress hyperglycemia 10/04/2011      10/06/2011  
   
                                                      
  
  
Overview:Formatting of this note might be different from the original.  
10/4/2011  
Perioperative insulin resistance and exacerbation of hyperglycemia.  
Control blood glucose with titration of insulin infusion  
  
10/5/2011  
adequate control  
goal FBG<180  
  
   
   
                                Post-op pain    10/04/2011      10/06/2011  
   
                                Mechanically assisted ventilation 10/04/2011      
  10/05/2011  
   
                                                      
  
  
Overview:Formatting of this note might be different from the original.  
10/4/2011  
optimize MV settings, WTE  
current setting:FiO2, 75%, peep 8  
   
   
                                Cardiac insufficiency 10/04/2011      10/05/2011  
   
                                                      
  
  
Overview:Formatting of this note might be different from the original.  
10/4/2011  
RV mild to moderate post pump  
goal CI>2.3  
   
   
                                Hypoxemia       10/04/2011      10/06/2011  
   
                                discharge planning 2011      10/19/2011  
   
                                                      
  
  
Overview:Formatting of this note might be different from the original.  
age 71,  lives in [place], [place]. No DC needs.  
/  
   
   
                                Pre-op testing  2011      10/04/2011  
   
                                                      
  
  
Overview:Formatting of this note is different from the original.  
Images from the original note were not included.  
[facility]  
PRE-OP CHECKLIST  
  
Surgeon: Deangelo Angulo M.D. Informed Consent Completed: No  
STS Score: 1.4%  
CAD: Yes - CAD on Problem List: Yes  
Is intended procedure a CABG: Yes - is a beta blocker ordered? Yes  
  
H & P completed: Yes  
  
PA/LAT: Completed CT: Completed MRI: N/A LE US: N/A  
  
Cath: Yes - reviewed: Yes Echo:Completed EKG: Completed EF %: 61  
  
PI's: N/A Carotid: Completed Mapping: N/A Dental: Completed PFT's: N/A  
  
Basename 11 0729  
WBC 7.18  
HB 13.1  
HCT 41.8  
  
INR 1.0  
CREAT 1.35  
  
UA: Normal  
HCG:N/A  
  
ABO/ABO Confirmed: Yes  
  
Blood ordered: No  
  
SA Swab: Yes - results: Pending  
  
Last Dose of Anticoagulation: vitamins  
  
Op Note: N/A  
  
Pacemaker Check: N/A  
  
Consults: GI 2001  
  
DM: No  
  
Cardiac Surgical prep: No  
  
SIGNATURE: Krystal Castellanos RN CHECKED BY:  
DATE of SERVICE: 2011  
TIME of SERVICE: 2:23 PM  
  
  
   
   
                                Anemia of chronic disease 2011  
   
                                Intestinal polyp 2011  
   
                                                      
  
  
Overview:Formatting of this note might be different from the original.  
Distal ileum ulcerated polyp.  
   
   
                                Nonspecific abnormal results of liver function s  
tudy 2006  
   
                                Impotence of organic origin 2006  
   
                                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  
                 2021  
  
documented as of this encounter (statuses as of 2023)  
[facility]06- History of Past illness Narrative*   
  
                                Problem         Noted Date      Resolved Date  
   
                                Medicare annual wellness visit, subsequent 2019  
   
                                Encounter for long-term (current) use of medicat  
ions 2018  
   
                                                      
  
  
Overview:Formatting of this note might be different from the original.  
Added automatically from request for surgery [id number]  
   
   
                                History of colonic polyps 2018  
   
                                                      
  
  
Overview:Formatting of this note might be different from the original.  
Added automatically from request for surgery [id number]  
   
   
                                Gastroesophageal reflux disease 2018  
   
                                                      
  
  
Overview:Formatting of this note might be different from the original.  
Added automatically from request for surgery [id number]  
   
   
                                Acute pulmonary edema 10/05/2011      10/06/2011  
   
                                                      
  
  
Overview:Formatting of this note might be different from the original.  
10/5/2011  
cardiogenic and noncardiogenic factors  
  
   
   
                                Atelectasis     10/05/2011      10/06/2011  
   
                                Hypotension     10/04/2011      10/06/2011  
   
                                                      
  
  
Overview:Formatting of this note might be different from the original.  
10/4/2011  
goal map 65-80  
   
   
                                Stress hyperglycemia 10/04/2011      10/06/2011  
   
                                                      
  
  
Overview:Formatting of this note might be different from the original.  
10/4/2011  
Perioperative insulin resistance and exacerbation of hyperglycemia.  
Control blood glucose with titration of insulin infusion  
  
10/5/2011  
adequate control  
goal FBG<180  
  
   
   
                                Post-op pain    10/04/2011      10/06/2011  
   
                                Mechanically assisted ventilation 10/04/2011      
  10/05/2011  
   
                                                      
  
  
Overview:Formatting of this note might be different from the original.  
10/4/2011  
optimize MV settings, WTE  
current setting:FiO2, 75%, peep 8  
   
   
                                Cardiac insufficiency 10/04/2011      10/05/2011  
   
                                                      
  
  
Overview:Formatting of this note might be different from the original.  
10/4/2011  
RV mild to moderate post pump  
goal CI>2.3  
   
   
                                Hypoxemia       10/04/2011      10/06/2011  
   
                                discharge planning 2011      10/19/2011  
   
                                                      
  
  
Overview:Formatting of this note might be different from the original.  
age 71,  lives in [place], [place]. No DC needs.  
/  
   
   
                                Pre-op testing  2011      10/04/2011  
   
                                                      
  
  
Overview:Formatting of this note is different from the original.  
Images from the original note were not included.  
[facility]  
PRE-OP CHECKLIST  
  
Surgeon: Deangelo Angulo M.D. Informed Consent Completed: No  
STS Score: 1.4%  
CAD: Yes - CAD on Problem List: Yes  
Is intended procedure a CABG: Yes - is a beta blocker ordered? Yes  
  
H & P completed: Yes  
  
PA/LAT: Completed CT: Completed MRI: N/A LE US: N/A  
  
Cath: Yes - reviewed: Yes Echo:Completed EKG: Completed EF %: 61  
  
PI's: N/A Carotid: Completed Mapping: N/A Dental: Completed PFT's: N/A  
  
Basename 11 0729  
WBC 7.18  
HB 13.1  
HCT 41.8  
  
INR 1.0  
CREAT 1.35  
  
UA: Normal  
HCG:N/A  
  
ABO/ABO Confirmed: Yes  
  
Blood ordered: No  
  
SA Swab: Yes - results: Pending  
  
Last Dose of Anticoagulation: vitamins  
  
Op Note: N/A  
  
Pacemaker Check: N/A  
  
Consults: GI 2001  
  
DM: No  
  
Cardiac Surgical prep: No  
  
SIGNATURE: Krystal Castellanos RN CHECKED BY:  
DATE of SERVICE: 2011  
TIME of SERVICE: 2:23 PM  
  
  
   
   
                                Anemia of chronic disease 2011  
   
                                Intestinal polyp 2011  
   
                                                      
  
  
Overview:Formatting of this note might be different from the original.  
Distal ileum ulcerated polyp.  
   
   
                                Nonspecific abnormal results of liver function s  
tudy 2006  
   
                                Impotence of organic origin 2006  
   
                                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  
                 2021  
  
documented as of this encounter (statuses as of 2023)  
[facility]- History of Past illness Narrative*   
  
                                Problem         Noted Date      Resolved Date  
   
                                Medicare annual wellness visit, subsequent 2019  
   
                                Encounter for long-term (current) use of medicat  
ions 2018  
   
                                                      
  
  
Overview:Formatting of this note might be different from the original.  
Added automatically from request for surgery [id number]  
   
   
                                History of colonic polyps 2018  
   
                                                      
  
  
Overview:Formatting of this note might be different from the original.  
Added automatically from request for surgery [id number]  
   
   
                                Gastroesophageal reflux disease 2018  
   
                                                      
  
  
Overview:Formatting of this note might be different from the original.  
Added automatically from request for surgery [id number]  
   
   
                                Acute pulmonary edema 10/05/2011      10/06/2011  
   
                                                      
  
  
Overview:Formatting of this note might be different from the original.  
10/5/2011  
cardiogenic and noncardiogenic factors  
  
   
   
                                Atelectasis     10/05/2011      10/06/2011  
   
                                Hypotension     10/04/2011      10/06/2011  
   
                                                      
  
  
Overview:Formatting of this note might be different from the original.  
10/4/2011  
goal map 65-80  
   
   
                                Stress hyperglycemia 10/04/2011      10/06/2011  
   
                                                      
  
  
Overview:Formatting of this note might be different from the original.  
10/4/2011  
Perioperative insulin resistance and exacerbation of hyperglycemia.  
Control blood glucose with titration of insulin infusion  
  
10/5/2011  
adequate control  
goal FBG<180  
  
   
   
                                Post-op pain    10/04/2011      10/06/2011  
   
                                Mechanically assisted ventilation 10/04/2011      
  10/05/2011  
   
                                                      
  
  
Overview:Formatting of this note might be different from the original.  
10/4/2011  
optimize MV settings, WTE  
current setting:FiO2, 75%, peep 8  
   
   
                                Cardiac insufficiency 10/04/2011      10/05/2011  
   
                                                      
  
  
Overview:Formatting of this note might be different from the original.  
10/4/2011  
RV mild to moderate post pump  
goal CI>2.3  
   
   
                                Hypoxemia       10/04/2011      10/06/2011  
   
                                discharge planning 2011      10/19/2011  
   
                                                      
  
  
Overview:Formatting of this note might be different from the original.  
age 71,  lives in [place], [place]. No DC needs.  
/  
   
   
                                Pre-op testing  2011      10/04/2011  
   
                                                      
  
  
Overview:Formatting of this note is different from the original.  
Images from the original note were not included.  
[facility]  
PRE-OP CHECKLIST  
  
Surgeon: Deangelo Angulo M.D. Informed Consent Completed: No  
STS Score: 1.4%  
CAD: Yes - CAD on Problem List: Yes  
Is intended procedure a CABG: Yes - is a beta blocker ordered? Yes  
  
H & P completed: Yes  
  
PA/LAT: Completed CT: Completed MRI: N/A LE US: N/A  
  
Cath: Yes - reviewed: Yes Echo:Completed EKG: Completed EF %: 61  
  
PI's: N/A Carotid: Completed Mapping: N/A Dental: Completed PFT's: N/A  
  
Basename 11 0729  
WBC 7.18  
HB 13.1  
HCT 41.8  
  
INR 1.0  
CREAT 1.35  
  
UA: Normal  
HCG:N/A  
  
ABO/ABO Confirmed: Yes  
  
Blood ordered: No  
  
SA Swab: Yes - results: Pending  
  
Last Dose of Anticoagulation: vitamins  
  
Op Note: N/A  
  
Pacemaker Check: N/A  
  
Consults: GI 2001  
  
DM: No  
  
Cardiac Surgical prep: No  
  
SIGNATURE: Krystal Castellanos RN CHECKED BY:  
DATE of SERVICE: 2011  
TIME of SERVICE: 2:23 PM  
  
  
   
   
                                Anemia of chronic disease 2011  
   
                                Intestinal polyp 2011  
   
                                                      
  
  
Overview:Formatting of this note might be different from the original.  
Distal ileum ulcerated polyp.  
   
   
                                Nonspecific abnormal results of liver function s  
pepe 2006  
   
                                Impotence of organic origin 2006      09/0  
2015  
   
                                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  
                 2021  
  
documented as of this encounter (statuses as of 2023)  
[facility]- History of Past illness Narrative*   
  
                                Problem         Noted Date      Resolved Date  
   
                                Medicare annual wellness visit, subsequent 2019  
   
                                Encounter for long-term (current) use of medicat  
ions 2018  
   
                                                      
  
  
Overview:Formatting of this note might be different from the original.  
Added automatically from request for surgery [id number]  
   
   
                                History of colonic polyps 2018  
   
                                                      
  
  
Overview:Formatting of this note might be different from the original.  
Added automatically from request for surgery [id number]  
   
   
                                Gastroesophageal reflux disease 2018  
   
                                                      
  
  
Overview:Formatting of this note might be different from the original.  
Added automatically from request for surgery [id number]  
   
   
                                Acute pulmonary edema 10/05/2011      10/06/2011  
   
                                                      
  
  
Overview:Formatting of this note might be different from the original.  
10/5/2011  
cardiogenic and noncardiogenic factors  
  
   
   
                                Atelectasis     10/05/2011      10/06/2011  
   
                                Hypotension     10/04/2011      10/06/2011  
   
                                                      
  
  
Overview:Formatting of this note might be different from the original.  
10/4/2011  
goal map 65-80  
   
   
                                Stress hyperglycemia 10/04/2011      10/06/2011  
   
                                                      
  
  
Overview:Formatting of this note might be different from the original.  
10/4/2011  
Perioperative insulin resistance and exacerbation of hyperglycemia.  
Control blood glucose with titration of insulin infusion  
  
10/5/2011  
adequate control  
goal FBG<180  
  
   
   
                                Post-op pain    10/04/2011      10/06/2011  
   
                                Mechanically assisted ventilation 10/04/2011      
  10/05/2011  
   
                                                      
  
  
Overview:Formatting of this note might be different from the original.  
10/4/2011  
optimize MV settings, WTE  
current setting:FiO2, 75%, peep 8  
   
   
                                Cardiac insufficiency 10/04/2011      10/05/2011  
   
                                                      
  
  
Overview:Formatting of this note might be different from the original.  
10/4/2011  
RV mild to moderate post pump  
goal CI>2.3  
   
   
                                Hypoxemia       10/04/2011      10/06/2011  
   
                                discharge planning 2011      10/19/2011  
   
                                                      
  
  
Overview:Formatting of this note might be different from the original.  
age 71,  lives in [place], [place]. No DC needs.  
/  
   
   
                                Pre-op testing  2011      10/04/2011  
   
                                                      
  
  
Overview:Formatting of this note is different from the original.  
Images from the original note were not included.  
[facility]  
PRE-OP CHECKLIST  
  
Surgeon: Deangelo Angulo M.D. Informed Consent Completed: No  
STS Score: 1.4%  
CAD: Yes - CAD on Problem List: Yes  
Is intended procedure a CABG: Yes - is a beta blocker ordered? Yes  
  
H & P completed: Yes  
  
PA/LAT: Completed CT: Completed MRI: N/A LE US: N/A  
  
Cath: Yes - reviewed: Yes Echo:Completed EKG: Completed EF %: 61  
  
PI's: N/A Carotid: Completed Mapping: N/A Dental: Completed PFT's: N/A  
  
Basename 11 0729  
WBC 7.18  
HB 13.1  
HCT 41.8  
  
INR 1.0  
CREAT 1.35  
  
UA: Normal  
HCG:N/A  
  
ABO/ABO Confirmed: Yes  
  
Blood ordered: No  
  
SA Swab: Yes - results: Pending  
  
Last Dose of Anticoagulation: vitamins  
  
Op Note: N/A  
  
Pacemaker Check: N/A  
  
Consults: GI 2001  
  
DM: No  
  
Cardiac Surgical prep: No  
  
SIGNATURE: Krystal Castellanos RN CHECKED BY:  
DATE of SERVICE: 2011  
TIME of SERVICE: 2:23 PM  
  
  
   
   
                                Anemia of chronic disease 2011  
   
                                Intestinal polyp 2011  
   
                                                      
  
  
Overview:Formatting of this note might be different from the original.  
Distal ileum ulcerated polyp.  
   
   
                                Nonspecific abnormal results of liver function s  
pepe 2006  
   
                                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  
2015  
   
                                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  
                 2021  
  
documented as of this encounter (statuses as of 2023)  
[facility]- History of Past illness Narrative*   
  
                                Problem         Noted Date      Resolved Date  
   
                                Medicare annual wellness visit, subsequent 2019  
   
                                Encounter for long-term (current) use of medicat  
ions 2018  
   
                                                      
  
  
Overview:Formatting of this note might be different from the original.  
Added automatically from request for surgery [id number]  
   
   
                                History of colonic polyps 2018  
   
                                                      
  
  
Overview:Formatting of this note might be different from the original.  
Added automatically from request for surgery [id number]  
   
   
                                Gastroesophageal reflux disease 2018  
   
                                                      
  
  
Overview:Formatting of this note might be different from the original.  
Added automatically from request for surgery [id number]  
   
   
                                Acute pulmonary edema 10/05/2011      10/06/2011  
   
                                                      
  
  
Overview:Formatting of this note might be different from the original.  
10/5/2011  
cardiogenic and noncardiogenic factors  
  
   
   
                                Atelectasis     10/05/2011      10/06/2011  
   
                                Hypotension     10/04/2011      10/06/2011  
   
                                                      
  
  
Overview:Formatting of this note might be different from the original.  
10/4/2011  
goal map 65-80  
   
   
                                Stress hyperglycemia 10/04/2011      10/06/2011  
   
                                                      
  
  
Overview:Formatting of this note might be different from the original.  
10/4/2011  
Perioperative insulin resistance and exacerbation of hyperglycemia.  
Control blood glucose with titration of insulin infusion  
  
10/5/2011  
adequate control  
goal FBG<180  
  
   
   
                                Post-op pain    10/04/2011      10/06/2011  
   
                                Mechanically assisted ventilation 10/04/2011      
  10/05/2011  
   
                                                      
  
  
Overview:Formatting of this note might be different from the original.  
10/4/2011  
optimize MV settings, WTE  
current setting:FiO2, 75%, peep 8  
   
   
                                Cardiac insufficiency 10/04/2011      10/05/2011  
   
                                                      
  
  
Overview:Formatting of this note might be different from the original.  
10/4/2011  
RV mild to moderate post pump  
goal CI>2.3  
   
   
                                Hypoxemia       10/04/2011      10/06/2011  
   
                                discharge planning 2011      10/19/2011  
   
                                                      
  
  
Overview:Formatting of this note might be different from the original.  
age 71,  lives in [place], [place]. No DC needs.  
/  
   
   
                                Pre-op testing  2011      10/04/2011  
   
                                                      
  
  
Overview:Formatting of this note is different from the original.  
Images from the original note were not included.  
[facility]  
PRE-OP CHECKLIST  
  
Surgeon: Deangelo Angulo M.D. Informed Consent Completed: No  
STS Score: 1.4%  
CAD: Yes - CAD on Problem List: Yes  
Is intended procedure a CABG: Yes - is a beta blocker ordered? Yes  
  
H & P completed: Yes  
  
PA/LAT: Completed CT: Completed MRI: N/A LE US: N/A  
  
Cath: Yes - reviewed: Yes Echo:Completed EKG: Completed EF %: 61  
  
PI's: N/A Carotid: Completed Mapping: N/A Dental: Completed PFT's: N/A  
  
Basename 11 0729  
WBC 7.18  
HB 13.1  
HCT 41.8  
  
INR 1.0  
CREAT 1.35  
  
UA: Normal  
HCG:N/A  
  
ABO/ABO Confirmed: Yes  
  
Blood ordered: No  
  
SA Swab: Yes - results: Pending  
  
Last Dose of Anticoagulation: vitamins  
  
Op Note: N/A  
  
Pacemaker Check: N/A  
  
Consults: GI 2001  
  
DM: No  
  
Cardiac Surgical prep: No  
  
SIGNATURE: Krystal Castellanos RN CHECKED BY:  
DATE of SERVICE: 2011  
TIME of SERVICE: 2:23 PM  
  
  
   
   
                                Anemia of chronic disease 2011  
   
                                Intestinal polyp 2011  
   
                                                      
  
  
Overview:Formatting of this note might be different from the original.  
Distal ileum ulcerated polyp.  
   
   
                                Nonspecific abnormal results of liver function s  
nialldy 2006  
   
                                Impotence of organic origin 2006  
   
                                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  
                 2021  
  
documented as of this encounter (statuses as of 2023)  
[facility]06- History of Past illness Narrative*   
  
                                Problem         Noted Date      Resolved Date  
   
                                Medicare annual wellness visit, subsequent 2019  
   
                                Encounter for long-term (current) use of medicat  
ions 2018  
   
                                                      
  
  
Overview:Formatting of this note might be different from the original.  
Added automatically from request for surgery [id number]  
   
   
                                History of colonic polyps 2018  
   
                                                      
  
  
Overview:Formatting of this note might be different from the original.  
Added automatically from request for surgery [id number]  
   
   
                                Gastroesophageal reflux disease 2018  
   
                                                      
  
  
Overview:Formatting of this note might be different from the original.  
Added automatically from request for surgery [id number]  
   
   
                                Acute pulmonary edema 10/05/2011      10/06/2011  
   
                                                      
  
  
Overview:Formatting of this note might be different from the original.  
10/5/2011  
cardiogenic and noncardiogenic factors  
  
   
   
                                Atelectasis     10/05/2011      10/06/2011  
   
                                Hypotension     10/04/2011      10/06/2011  
   
                                                      
  
  
Overview:Formatting of this note might be different from the original.  
10/4/2011  
goal map 65-80  
   
   
                                Stress hyperglycemia 10/04/2011      10/06/2011  
   
                                                      
  
  
Overview:Formatting of this note might be different from the original.  
10/4/2011  
Perioperative insulin resistance and exacerbation of hyperglycemia.  
Control blood glucose with titration of insulin infusion  
  
10/5/2011  
adequate control  
goal FBG<180  
  
   
   
                                Post-op pain    10/04/2011      10/06/2011  
   
                                Mechanically assisted ventilation 10/04/2011      
  10/05/2011  
   
                                                      
  
  
Overview:Formatting of this note might be different from the original.  
10/4/2011  
optimize MV settings, WTE  
current setting:FiO2, 75%, peep 8  
   
   
                                Cardiac insufficiency 10/04/2011      10/05/2011  
   
                                                      
  
  
Overview:Formatting of this note might be different from the original.  
10/4/2011  
RV mild to moderate post pump  
goal CI>2.3  
   
   
                                Hypoxemia       10/04/2011      10/06/2011  
   
                                discharge planning 2011      10/19/2011  
   
                                                      
  
  
Overview:Formatting of this note might be different from the original.  
age 71,  lives in [place], [place]. No DC needs.  
/  
   
   
                                Pre-op testing  2011      10/04/2011  
   
                                                      
  
  
Overview:Formatting of this note is different from the original.  
Images from the original note were not included.  
[facility]  
PRE-OP CHECKLIST  
  
Surgeon: Deangelo Angulo M.D. Informed Consent Completed: No  
STS Score: 1.4%  
CAD: Yes - CAD on Problem List: Yes  
Is intended procedure a CABG: Yes - is a beta blocker ordered? Yes  
  
H & P completed: Yes  
  
PA/LAT: Completed CT: Completed MRI: N/A LE US: N/A  
  
Cath: Yes - reviewed: Yes Echo:Completed EKG: Completed EF %: 61  
  
PI's: N/A Carotid: Completed Mapping: N/A Dental: Completed PFT's: N/A  
  
Basename 11 0729  
WBC 7.18  
HB 13.1  
HCT 41.8  
  
INR 1.0  
CREAT 1.35  
  
UA: Normal  
HCG:N/A  
  
ABO/ABO Confirmed: Yes  
  
Blood ordered: No  
  
SA Swab: Yes - results: Pending  
  
Last Dose of Anticoagulation: vitamins  
  
Op Note: N/A  
  
Pacemaker Check: N/A  
  
Consults: GI 2001  
  
DM: No  
  
Cardiac Surgical prep: No  
  
SIGNATURE: Krystal Castellanos RN CHECKED BY:  
DATE of SERVICE: 2011  
TIME of SERVICE: 2:23 PM  
  
  
   
   
                                Anemia of chronic disease 2011  
   
                                Intestinal polyp 2011  
   
                                                      
  
  
Overview:Formatting of this note might be different from the original.  
Distal ileum ulcerated polyp.  
   
   
                                Nonspecific abnormal results of liver function s  
tudy 2006  
   
                                Impotence of organic origin 2006  
   
                                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  
                 2021  
  
documented as of this encounter (statuses as of 2023)  
[facility]06- History of Past illness Narrative*   
  
                                Problem         Noted Date      Resolved Date  
   
                                Medicare annual wellness visit, subsequent 2019  
   
                                Encounter for long-term (current) use of medicat  
ions 2018  
   
                                                      
  
  
Overview:Formatting of this note might be different from the original.  
Added automatically from request for surgery [id number]  
   
   
                                History of colonic polyps 2018  
   
                                                      
  
  
Overview:Formatting of this note might be different from the original.  
Added automatically from request for surgery [id number]  
   
   
                                Gastroesophageal reflux disease 2018  
   
                                                      
  
  
Overview:Formatting of this note might be different from the original.  
Added automatically from request for surgery [id number]  
   
   
                                Acute pulmonary edema 10/05/2011      10/06/2011  
   
                                                      
  
  
Overview:Formatting of this note might be different from the original.  
10/5/2011  
cardiogenic and noncardiogenic factors  
  
   
   
                                Atelectasis     10/05/2011      10/06/2011  
   
                                Hypotension     10/04/2011      10/06/2011  
   
                                                      
  
  
Overview:Formatting of this note might be different from the original.  
10/4/2011  
goal map 65-80  
   
   
                                Stress hyperglycemia 10/04/2011      10/06/2011  
   
                                                      
  
  
Overview:Formatting of this note might be different from the original.  
10/4/2011  
Perioperative insulin resistance and exacerbation of hyperglycemia.  
Control blood glucose with titration of insulin infusion  
  
10/5/2011  
adequate control  
goal FBG<180  
  
   
   
                                Post-op pain    10/04/2011      10/06/2011  
   
                                Mechanically assisted ventilation 10/04/2011      
  10/05/2011  
   
                                                      
  
  
Overview:Formatting of this note might be different from the original.  
10/4/2011  
optimize MV settings, WTE  
current setting:FiO2, 75%, peep 8  
   
   
                                Cardiac insufficiency 10/04/2011      10/05/2011  
   
                                                      
  
  
Overview:Formatting of this note might be different from the original.  
10/4/2011  
RV mild to moderate post pump  
goal CI>2.3  
   
   
                                Hypoxemia       10/04/2011      10/06/2011  
   
                                discharge planning 2011      10/19/2011  
   
                                                      
  
  
Overview:Formatting of this note might be different from the original.  
age 71,  lives in [place], [place]. No DC needs.  
/  
   
   
                                Pre-op testing  2011      10/04/2011  
   
                                                      
  
  
Overview:Formatting of this note is different from the original.  
Images from the original note were not included.  
[facility]  
PRE-OP CHECKLIST  
  
Surgeon: Deangelo Angulo M.D. Informed Consent Completed: No  
STS Score: 1.4%  
CAD: Yes - CAD on Problem List: Yes  
Is intended procedure a CABG: Yes - is a beta blocker ordered? Yes  
  
H & P completed: Yes  
  
PA/LAT: Completed CT: Completed MRI: N/A LE US: N/A  
  
Cath: Yes - reviewed: Yes Echo:Completed EKG: Completed EF %: 61  
  
PI's: N/A Carotid: Completed Mapping: N/A Dental: Completed PFT's: N/A  
  
Basename 11 0729  
WBC 7.18  
HB 13.1  
HCT 41.8  
  
INR 1.0  
CREAT 1.35  
  
UA: Normal  
HCG:N/A  
  
ABO/ABO Confirmed: Yes  
  
Blood ordered: No  
  
SA Swab: Yes - results: Pending  
  
Last Dose of Anticoagulation: vitamins  
  
Op Note: N/A  
  
Pacemaker Check: N/A  
  
Consults: GI 2001  
  
DM: No  
  
Cardiac Surgical prep: No  
  
SIGNATURE: Krystal Castellanos RN CHECKED BY:  
DATE of SERVICE: 2011  
TIME of SERVICE: 2:23 PM  
  
  
   
   
                                Anemia of chronic disease 2011  
   
                                Intestinal polyp 2011  
   
                                                      
  
  
Overview:Formatting of this note might be different from the original.  
Distal ileum ulcerated polyp.  
   
   
                                Nonspecific abnormal results of liver function s  
tudy 2006  
   
                                Impotence of organic origin 2006  
   
                                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  
                 2021  
  
documented as of this encounter (statuses as of 2023)  
[facility]06- History of Past illness Narrative*   
  
                                Problem         Noted Date      Resolved Date  
   
                                Medicare annual wellness visit, subsequent 2019  
   
                                Encounter for long-term (current) use of medicat  
ions 2018  
   
                                                      
  
  
Overview:Formatting of this note might be different from the original.  
Added automatically from request for surgery [id number]  
   
   
                                History of colonic polyps 2018  
   
                                                      
  
  
Overview:Formatting of this note might be different from the original.  
Added automatically from request for surgery [id number]  
   
   
                                Gastroesophageal reflux disease 2018  
   
                                                      
  
  
Overview:Formatting of this note might be different from the original.  
Added automatically from request for surgery [id number]  
   
   
                                Acute pulmonary edema 10/05/2011      10/06/2011  
   
                                                      
  
  
Overview:Formatting of this note might be different from the original.  
10/5/2011  
cardiogenic and noncardiogenic factors  
  
   
   
                                Atelectasis     10/05/2011      10/06/2011  
   
                                Hypotension     10/04/2011      10/06/2011  
   
                                                      
  
  
Overview:Formatting of this note might be different from the original.  
10/4/2011  
goal map 65-80  
   
   
                                Stress hyperglycemia 10/04/2011      10/06/2011  
   
                                                      
  
  
Overview:Formatting of this note might be different from the original.  
10/4/2011  
Perioperative insulin resistance and exacerbation of hyperglycemia.  
Control blood glucose with titration of insulin infusion  
  
10/5/2011  
adequate control  
goal FBG<180  
  
   
   
                                Post-op pain    10/04/2011      10/06/2011  
   
                                Mechanically assisted ventilation 10/04/2011      
  10/05/2011  
   
                                                      
  
  
Overview:Formatting of this note might be different from the original.  
10/4/2011  
optimize MV settings, WTE  
current setting:FiO2, 75%, peep 8  
   
   
                                Cardiac insufficiency 10/04/2011      10/05/2011  
   
                                                      
  
  
Overview:Formatting of this note might be different from the original.  
10/4/2011  
RV mild to moderate post pump  
goal CI>2.3  
   
   
                                Hypoxemia       10/04/2011      10/06/2011  
   
                                discharge planning 2011      10/19/2011  
   
                                                      
  
  
Overview:Formatting of this note might be different from the original.  
age 71,  lives in [place], [place]. No DC needs.  
/  
   
   
                                Pre-op testing  2011      10/04/2011  
   
                                                      
  
  
Overview:Formatting of this note is different from the original.  
Images from the original note were not included.  
[facility]  
PRE-OP CHECKLIST  
  
Surgeon: Deangelo Angulo M.D. Informed Consent Completed: No  
STS Score: 1.4%  
CAD: Yes - CAD on Problem List: Yes  
Is intended procedure a CABG: Yes - is a beta blocker ordered? Yes  
  
H & P completed: Yes  
  
PA/LAT: Completed CT: Completed MRI: N/A LE US: N/A  
  
Cath: Yes - reviewed: Yes Echo:Completed EKG: Completed EF %: 61  
  
PI's: N/A Carotid: Completed Mapping: N/A Dental: Completed PFT's: N/A  
  
Basename 11 0729  
WBC 7.18  
HB 13.1  
HCT 41.8  
  
INR 1.0  
CREAT 1.35  
  
UA: Normal  
HCG:N/A  
  
ABO/ABO Confirmed: Yes  
  
Blood ordered: No  
  
SA Swab: Yes - results: Pending  
  
Last Dose of Anticoagulation: vitamins  
  
Op Note: N/A  
  
Pacemaker Check: N/A  
  
Consults: GI 2001  
  
DM: No  
  
Cardiac Surgical prep: No  
  
SIGNATURE: Krystal Castellanos RN CHECKED BY:  
DATE of SERVICE: 2011  
TIME of SERVICE: 2:23 PM  
  
  
   
   
                                Anemia of chronic disease 2011  
   
                                Intestinal polyp 2011  
   
                                                      
  
  
Overview:Formatting of this note might be different from the original.  
Distal ileum ulcerated polyp.  
   
   
                                Nonspecific abnormal results of liver function s  
pepe 2006  
   
                                Impotence of organic origin 2006  
   
                                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  
                 2021  
  
documented as of this encounter (statuses as of 2023)  
[facility]- History of Past illness Narrative*   
  
                                Problem         Noted Date      Resolved Date  
   
                                Medicare annual wellness visit, subsequent 2019  
   
                                Encounter for long-term (current) use of medicat  
ions 2018  
   
                                                      
  
  
Overview:Formatting of this note might be different from the original.  
Added automatically from request for surgery [id number]  
   
   
                                History of colonic polyps 2018  
   
                                                      
  
  
Overview:Formatting of this note might be different from the original.  
Added automatically from request for surgery [id number]  
   
   
                                Gastroesophageal reflux disease 2018  
   
                                                      
  
  
Overview:Formatting of this note might be different from the original.  
Added automatically from request for surgery [id number]  
   
   
                                Acute pulmonary edema 10/05/2011      10/06/2011  
   
                                                      
  
  
Overview:Formatting of this note might be different from the original.  
10/5/2011  
cardiogenic and noncardiogenic factors  
  
   
   
                                Atelectasis     10/05/2011      10/06/2011  
   
                                Hypotension     10/04/2011      10/06/2011  
   
                                                      
  
  
Overview:Formatting of this note might be different from the original.  
10/4/2011  
goal map 65-80  
   
   
                                Stress hyperglycemia 10/04/2011      10/06/2011  
   
                                                      
  
  
Overview:Formatting of this note might be different from the original.  
10/4/2011  
Perioperative insulin resistance and exacerbation of hyperglycemia.  
Control blood glucose with titration of insulin infusion  
  
10/5/2011  
adequate control  
goal FBG<180  
  
   
   
                                Post-op pain    10/04/2011      10/06/2011  
   
                                Mechanically assisted ventilation 10/04/2011      
  10/05/2011  
   
                                                      
  
  
Overview:Formatting of this note might be different from the original.  
10/4/2011  
optimize MV settings, WTE  
current setting:FiO2, 75%, peep 8  
   
   
                                Cardiac insufficiency 10/04/2011      10/05/2011  
   
                                                      
  
  
Overview:Formatting of this note might be different from the original.  
10/4/2011  
RV mild to moderate post pump  
goal CI>2.3  
   
   
                                Hypoxemia       10/04/2011      10/06/2011  
   
                                discharge planning 2011      10/19/2011  
   
                                                      
  
  
Overview:Formatting of this note might be different from the original.  
age 71,  lives in [place], [place]. No DC needs.  
/  
   
   
                                Pre-op testing  2011      10/04/2011  
   
                                                      
  
  
Overview:Formatting of this note is different from the original.  
Images from the original note were not included.  
[facility]  
PRE-OP CHECKLIST  
  
Surgeon: Deangelo Angulo M.D. Informed Consent Completed: No  
STS Score: 1.4%  
CAD: Yes - CAD on Problem List: Yes  
Is intended procedure a CABG: Yes - is a beta blocker ordered? Yes  
  
H & P completed: Yes  
  
PA/LAT: Completed CT: Completed MRI: N/A LE US: N/A  
  
Cath: Yes - reviewed: Yes Echo:Completed EKG: Completed EF %: 61  
  
PI's: N/A Carotid: Completed Mapping: N/A Dental: Completed PFT's: N/A  
  
Basename 11 0729  
WBC 7.18  
HB 13.1  
HCT 41.8  
  
INR 1.0  
CREAT 1.35  
  
UA: Normal  
HCG:N/A  
  
ABO/ABO Confirmed: Yes  
  
Blood ordered: No  
  
SA Swab: Yes - results: Pending  
  
Last Dose of Anticoagulation: vitamins  
  
Op Note: N/A  
  
Pacemaker Check: N/A  
  
Consults: GI 2001  
  
DM: No  
  
Cardiac Surgical prep: No  
  
SIGNATURE: Krystal Castellanos RN CHECKED BY:  
DATE of SERVICE: 2011  
TIME of SERVICE: 2:23 PM  
  
  
   
   
                                Anemia of chronic disease 2011  
   
                                Intestinal polyp 2011  
   
                                                      
  
  
Overview:Formatting of this note might be different from the original.  
Distal ileum ulcerated polyp.  
   
   
                                Nonspecific abnormal results of liver function s  
tudy 2006  
   
                                Impotence of organic origin 2006  
   
                                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  
                 2021  
  
documented as of this encounter (statuses as of 2023)  
[facility]06- History of Past illness Narrative*   
  
                                Problem         Noted Date      Resolved Date  
   
                                Medicare annual wellness visit, subsequent 2019  
   
                                Encounter for long-term (current) use of medicat  
ions 2018  
   
                                                      
  
  
Overview:Formatting of this note might be different from the original.  
Added automatically from request for surgery [id number]  
   
   
                                History of colonic polyps 2018  
   
                                                      
  
  
Overview:Formatting of this note might be different from the original.  
Added automatically from request for surgery [id number]  
   
   
                                Gastroesophageal reflux disease 2018  
   
                                                      
  
  
Overview:Formatting of this note might be different from the original.  
Added automatically from request for surgery [id number]  
   
   
                                Acute pulmonary edema 10/05/2011      10/06/2011  
   
                                                      
  
  
Overview:Formatting of this note might be different from the original.  
10/5/2011  
cardiogenic and noncardiogenic factors  
  
   
   
                                Atelectasis     10/05/2011      10/06/2011  
   
                                Hypotension     10/04/2011      10/06/2011  
   
                                                      
  
  
Overview:Formatting of this note might be different from the original.  
10/4/2011  
goal map 65-80  
   
   
                                Stress hyperglycemia 10/04/2011      10/06/2011  
   
                                                      
  
  
Overview:Formatting of this note might be different from the original.  
10/4/2011  
Perioperative insulin resistance and exacerbation of hyperglycemia.  
Control blood glucose with titration of insulin infusion  
  
10/5/2011  
adequate control  
goal FBG<180  
  
   
   
                                Post-op pain    10/04/2011      10/06/2011  
   
                                Mechanically assisted ventilation 10/04/2011      
  10/05/2011  
   
                                                      
  
  
Overview:Formatting of this note might be different from the original.  
10/4/2011  
optimize MV settings, WTE  
current setting:FiO2, 75%, peep 8  
   
   
                                Cardiac insufficiency 10/04/2011      10/05/2011  
   
                                                      
  
  
Overview:Formatting of this note might be different from the original.  
10/4/2011  
RV mild to moderate post pump  
goal CI>2.3  
   
   
                                Hypoxemia       10/04/2011      10/06/2011  
   
                                discharge planning 2011      10/19/2011  
   
                                                      
  
  
Overview:Formatting of this note might be different from the original.  
age 71,  lives in [place], [place]. No DC needs.  
/  
   
   
                                Pre-op testing  2011      10/04/2011  
   
                                                      
  
  
Overview:Formatting of this note is different from the original.  
Images from the original note were not included.  
[facility]  
PRE-OP CHECKLIST  
  
Surgeon: Deangelo Angulo M.D. Informed Consent Completed: No  
STS Score: 1.4%  
CAD: Yes - CAD on Problem List: Yes  
Is intended procedure a CABG: Yes - is a beta blocker ordered? Yes  
  
H & P completed: Yes  
  
PA/LAT: Completed CT: Completed MRI: N/A LE US: N/A  
  
Cath: Yes - reviewed: Yes Echo:Completed EKG: Completed EF %: 61  
  
PI's: N/A Carotid: Completed Mapping: N/A Dental: Completed PFT's: N/A  
  
Basename 11 0729  
WBC 7.18  
HB 13.1  
HCT 41.8  
  
INR 1.0  
CREAT 1.35  
  
UA: Normal  
HCG:N/A  
  
ABO/ABO Confirmed: Yes  
  
Blood ordered: No  
  
SA Swab: Yes - results: Pending  
  
Last Dose of Anticoagulation: vitamins  
  
Op Note: N/A  
  
Pacemaker Check: N/A  
  
Consults: GI 2001  
  
DM: No  
  
Cardiac Surgical prep: No  
  
SIGNATURE: Krystal Castellanos RN CHECKED BY:  
DATE of SERVICE: 2011  
TIME of SERVICE: 2:23 PM  
  
  
   
   
                                Anemia of chronic disease 2011  
   
                                Intestinal polyp 2011  
   
                                                      
  
  
Overview:Formatting of this note might be different from the original.  
Distal ileum ulcerated polyp.  
   
   
                                Nonspecific abnormal results of liver function s  
tudy 2006  
   
                                Impotence of organic origin 2006  
   
                                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  
                 2021  
  
documented as of this encounter (statuses as of 2023)  
[facility]06- History of Past illness Narrative*   
  
                                Problem         Noted Date      Resolved Date  
   
                                Medicare annual wellness visit, subsequent 2019  
   
                                Encounter for long-term (current) use of medicat  
ions 2018  
   
                                                      
  
  
Overview:Formatting of this note might be different from the original.  
Added automatically from request for surgery [id number]  
   
   
                                History of colonic polyps 2018  
   
                                                      
  
  
Overview:Formatting of this note might be different from the original.  
Added automatically from request for surgery [id number]  
   
   
                                Gastroesophageal reflux disease 2018  
   
                                                      
  
  
Overview:Formatting of this note might be different from the original.  
Added automatically from request for surgery [id number]  
   
   
                                Acute pulmonary edema 10/05/2011      10/06/2011  
   
                                                      
  
  
Overview:Formatting of this note might be different from the original.  
10/5/2011  
cardiogenic and noncardiogenic factors  
  
   
   
                                Atelectasis     10/05/2011      10/06/2011  
   
                                Hypotension     10/04/2011      10/06/2011  
   
                                                      
  
  
Overview:Formatting of this note might be different from the original.  
10/4/2011  
goal map 65-80  
   
   
                                Stress hyperglycemia 10/04/2011      10/06/2011  
   
                                                      
  
  
Overview:Formatting of this note might be different from the original.  
10/4/2011  
Perioperative insulin resistance and exacerbation of hyperglycemia.  
Control blood glucose with titration of insulin infusion  
  
10/5/2011  
adequate control  
goal FBG<180  
  
   
   
                                Post-op pain    10/04/2011      10/06/2011  
   
                                Mechanically assisted ventilation 10/04/2011      
  10/05/2011  
   
                                                      
  
  
Overview:Formatting of this note might be different from the original.  
10/4/2011  
optimize MV settings, WTE  
current setting:FiO2, 75%, peep 8  
   
   
                                Cardiac insufficiency 10/04/2011      10/05/2011  
   
                                                      
  
  
Overview:Formatting of this note might be different from the original.  
10/4/2011  
RV mild to moderate post pump  
goal CI>2.3  
   
   
                                Hypoxemia       10/04/2011      10/06/2011  
   
                                discharge planning 2011      10/19/2011  
   
                                                      
  
  
Overview:Formatting of this note might be different from the original.  
age 71,  lives in [place], [place]. No DC needs.  
/  
   
   
                                Pre-op testing  2011      10/04/2011  
   
                                                      
  
  
Overview:Formatting of this note is different from the original.  
Images from the original note were not included.  
[facility]  
PRE-OP CHECKLIST  
  
Surgeon: Deangelo Angulo M.D. Informed Consent Completed: No  
STS Score: 1.4%  
CAD: Yes - CAD on Problem List: Yes  
Is intended procedure a CABG: Yes - is a beta blocker ordered? Yes  
  
H & P completed: Yes  
  
PA/LAT: Completed CT: Completed MRI: N/A LE US: N/A  
  
Cath: Yes - reviewed: Yes Echo:Completed EKG: Completed EF %: 61  
  
PI's: N/A Carotid: Completed Mapping: N/A Dental: Completed PFT's: N/A  
  
Basename 11 0729  
WBC 7.18  
HB 13.1  
HCT 41.8  
  
INR 1.0  
CREAT 1.35  
  
UA: Normal  
HCG:N/A  
  
ABO/ABO Confirmed: Yes  
  
Blood ordered: No  
  
SA Swab: Yes - results: Pending  
  
Last Dose of Anticoagulation: vitamins  
  
Op Note: N/A  
  
Pacemaker Check: N/A  
  
Consults: GI 2001  
  
DM: No  
  
Cardiac Surgical prep: No  
  
SIGNATURE: Krystal Castellanos RN CHECKED BY:  
DATE of SERVICE: 2011  
TIME of SERVICE: 2:23 PM  
  
  
   
   
                                Anemia of chronic disease 2011  
   
                                Intestinal polyp 2011  
   
                                                      
  
  
Overview:Formatting of this note might be different from the original.  
Distal ileum ulcerated polyp.  
   
   
                                Nonspecific abnormal results of liver function s  
pepe 2006  
   
                                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  
                 2021  
  
documented as of this encounter (statuses as of 2023)  
[facility]06- History of Past illness Narrative*   
  
                                Problem         Noted Date      Resolved Date  
   
                                Medicare annual wellness visit, subsequent 2019  
   
                                Encounter for long-term (current) use of medicat  
ions 2018  
   
                                                      
  
  
Overview:Formatting of this note might be different from the original.  
Added automatically from request for surgery [id number]  
   
   
                                History of colonic polyps 2018  
   
                                                      
  
  
Overview:Formatting of this note might be different from the original.  
Added automatically from request for surgery [id number]  
   
   
                                Gastroesophageal reflux disease 2018  
   
                                                      
  
  
Overview:Formatting of this note might be different from the original.  
Added automatically from request for surgery [id number]  
   
   
                                Acute pulmonary edema 10/05/2011      10/06/2011  
   
                                                      
  
  
Overview:Formatting of this note might be different from the original.  
10/5/2011  
cardiogenic and noncardiogenic factors  
  
   
   
                                Atelectasis     10/05/2011      10/06/2011  
   
                                Hypotension     10/04/2011      10/06/2011  
   
                                                      
  
  
Overview:Formatting of this note might be different from the original.  
10/4/2011  
goal map 65-80  
   
   
                                Stress hyperglycemia 10/04/2011      10/06/2011  
   
                                                      
  
  
Overview:Formatting of this note might be different from the original.  
10/4/2011  
Perioperative insulin resistance and exacerbation of hyperglycemia.  
Control blood glucose with titration of insulin infusion  
  
10/5/2011  
adequate control  
goal FBG<180  
  
   
   
                                Post-op pain    10/04/2011      10/06/2011  
   
                                Mechanically assisted ventilation 10/04/2011      
  10/05/2011  
   
                                                      
  
  
Overview:Formatting of this note might be different from the original.  
10/4/2011  
optimize MV settings, WTE  
current setting:FiO2, 75%, peep 8  
   
   
                                Cardiac insufficiency 10/04/2011      10/05/2011  
   
                                                      
  
  
Overview:Formatting of this note might be different from the original.  
10/4/2011  
RV mild to moderate post pump  
goal CI>2.3  
   
   
                                Hypoxemia       10/04/2011      10/06/2011  
   
                                discharge planning 2011      10/19/2011  
   
                                                      
  
  
Overview:Formatting of this note might be different from the original.  
age 71,  lives in [place], [place]. No DC needs.  
/  
   
   
                                Pre-op testing  2011      10/04/2011  
   
                                                      
  
  
Overview:Formatting of this note is different from the original.  
Images from the original note were not included.  
[facility]  
PRE-OP CHECKLIST  
  
Surgeon: Deangelo Angulo M.D. Informed Consent Completed: No  
STS Score: 1.4%  
CAD: Yes - CAD on Problem List: Yes  
Is intended procedure a CABG: Yes - is a beta blocker ordered? Yes  
  
H & P completed: Yes  
  
PA/LAT: Completed CT: Completed MRI: N/A LE US: N/A  
  
Cath: Yes - reviewed: Yes Echo:Completed EKG: Completed EF %: 61  
  
PI's: N/A Carotid: Completed Mapping: N/A Dental: Completed PFT's: N/A  
  
Basename 11 0729  
WBC 7.18  
HB 13.1  
HCT 41.8  
  
INR 1.0  
CREAT 1.35  
  
UA: Normal  
HCG:N/A  
  
ABO/ABO Confirmed: Yes  
  
Blood ordered: No  
  
SA Swab: Yes - results: Pending  
  
Last Dose of Anticoagulation: vitamins  
  
Op Note: N/A  
  
Pacemaker Check: N/A  
  
Consults: GI 2001  
  
DM: No  
  
Cardiac Surgical prep: No  
  
SIGNATURE: Krystal Castellanos RN CHECKED BY:  
DATE of SERVICE: 2011  
TIME of SERVICE: 2:23 PM  
  
  
   
   
                                Anemia of chronic disease 2011  
   
                                Intestinal polyp 2011  
   
                                                      
  
  
Overview:Formatting of this note might be different from the original.  
Distal ileum ulcerated polyp.  
   
   
                                Nonspecific abnormal results of liver function s  
nialldy 2006  
   
                                Impotence of organic origin 2006      09/0  
2015  
   
                                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  
                 2021  
  
documented as of this encounter (statuses as of 2023)  
[facility]- History of Past illness Narrative*   
  
                                Problem         Noted Date      Resolved Date  
   
                                Medicare annual wellness visit, subsequent 2019  
   
                                Encounter for long-term (current) use of medicat  
ions 2018  
   
                                                      
  
  
Overview:Formatting of this note might be different from the original.  
Added automatically from request for surgery [id number]  
   
   
                                History of colonic polyps 2018  
   
                                                      
  
  
Overview:Formatting of this note might be different from the original.  
Added automatically from request for surgery [id number]  
   
   
                                Gastroesophageal reflux disease 2018  
   
                                                      
  
  
Overview:Formatting of this note might be different from the original.  
Added automatically from request for surgery [id number]  
   
   
                                Acute pulmonary edema 10/05/2011      10/06/2011  
   
                                                      
  
  
Overview:Formatting of this note might be different from the original.  
10/5/2011  
cardiogenic and noncardiogenic factors  
  
   
   
                                Atelectasis     10/05/2011      10/06/2011  
   
                                Hypotension     10/04/2011      10/06/2011  
   
                                                      
  
  
Overview:Formatting of this note might be different from the original.  
10/4/2011  
goal map 65-80  
   
   
                                Stress hyperglycemia 10/04/2011      10/06/2011  
   
                                                      
  
  
Overview:Formatting of this note might be different from the original.  
10/4/2011  
Perioperative insulin resistance and exacerbation of hyperglycemia.  
Control blood glucose with titration of insulin infusion  
  
10/5/2011  
adequate control  
goal FBG<180  
  
   
   
                                Post-op pain    10/04/2011      10/06/2011  
   
                                Mechanically assisted ventilation 10/04/2011      
  10/05/2011  
   
                                                      
  
  
Overview:Formatting of this note might be different from the original.  
10/4/2011  
optimize MV settings, WTE  
current setting:FiO2, 75%, peep 8  
   
   
                                Cardiac insufficiency 10/04/2011      10/05/2011  
   
                                                      
  
  
Overview:Formatting of this note might be different from the original.  
10/4/2011  
RV mild to moderate post pump  
goal CI>2.3  
   
   
                                Hypoxemia       10/04/2011      10/06/2011  
   
                                discharge planning 2011      10/19/2011  
   
                                                      
  
  
Overview:Formatting of this note might be different from the original.  
age 71,  lives in [place], [place]. No DC needs.  
/  
   
   
                                Pre-op testing  2011      10/04/2011  
   
                                                      
  
  
Overview:Formatting of this note is different from the original.  
Images from the original note were not included.  
[facility]  
PRE-OP CHECKLIST  
  
Surgeon: Deangelo Angulo M.D. Informed Consent Completed: No  
STS Score: 1.4%  
CAD: Yes - CAD on Problem List: Yes  
Is intended procedure a CABG: Yes - is a beta blocker ordered? Yes  
  
H & P completed: Yes  
  
PA/LAT: Completed CT: Completed MRI: N/A LE US: N/A  
  
Cath: Yes - reviewed: Yes Echo:Completed EKG: Completed EF %: 61  
  
PI's: N/A Carotid: Completed Mapping: N/A Dental: Completed PFT's: N/A  
  
Basename 11 0729  
WBC 7.18  
HB 13.1  
HCT 41.8  
  
INR 1.0  
CREAT 1.35  
  
UA: Normal  
HCG:N/A  
  
ABO/ABO Confirmed: Yes  
  
Blood ordered: No  
  
SA Swab: Yes - results: Pending  
  
Last Dose of Anticoagulation: vitamins  
  
Op Note: N/A  
  
Pacemaker Check: N/A  
  
Consults: GI 2001  
  
DM: No  
  
Cardiac Surgical prep: No  
  
SIGNATURE: Krystal Castellanos RN CHECKED BY:  
DATE of SERVICE: 2011  
TIME of SERVICE: 2:23 PM  
  
  
   
   
                                Anemia of chronic disease 2011  
   
                                Intestinal polyp 2011  
   
                                                      
  
  
Overview:Formatting of this note might be different from the original.  
Distal ileum ulcerated polyp.  
   
   
                                Nonspecific abnormal results of liver function s  
tudy 2006  
   
                                Impotence of organic origin 2006  
   
                                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  
                 2021  
  
documented as of this encounter (statuses as of 02/10/2023)  
[facility]- History of Past illness Narrative*   
  
                                Problem         Noted Date      Resolved Date  
   
                                Medicare annual wellness visit, subsequent 2019  
   
                                Encounter for long-term (current) use of medicat  
ions 2018  
   
                                                      
  
  
Overview:Formatting of this note might be different from the original.  
Added automatically from request for surgery [id number]  
   
   
                                History of colonic polyps 2018  
   
                                                      
  
  
Overview:Formatting of this note might be different from the original.  
Added automatically from request for surgery [id number]  
   
   
                                Gastroesophageal reflux disease 2018  
   
                                                      
  
  
Overview:Formatting of this note might be different from the original.  
Added automatically from request for surgery [id number]  
   
   
                                Acute pulmonary edema 10/05/2011      10/06/2011  
   
                                                      
  
  
Overview:Formatting of this note might be different from the original.  
10/5/2011  
cardiogenic and noncardiogenic factors  
  
   
   
                                Atelectasis     10/05/2011      10/06/2011  
   
                                Hypotension     10/04/2011      10/06/2011  
   
                                                      
  
  
Overview:Formatting of this note might be different from the original.  
10/4/2011  
goal map 65-80  
   
   
                                Stress hyperglycemia 10/04/2011      10/06/2011  
   
                                                      
  
  
Overview:Formatting of this note might be different from the original.  
10/4/2011  
Perioperative insulin resistance and exacerbation of hyperglycemia.  
Control blood glucose with titration of insulin infusion  
  
10/5/2011  
adequate control  
goal FBG<180  
  
   
   
                                Post-op pain    10/04/2011      10/06/2011  
   
                                Mechanically assisted ventilation 10/04/2011      
  10/05/2011  
   
                                                      
  
  
Overview:Formatting of this note might be different from the original.  
10/4/2011  
optimize MV settings, WTE  
current setting:FiO2, 75%, peep 8  
   
   
                                Cardiac insufficiency 10/04/2011      10/05/2011  
   
                                                      
  
  
Overview:Formatting of this note might be different from the original.  
10/4/2011  
RV mild to moderate post pump  
goal CI>2.3  
   
   
                                Hypoxemia       10/04/2011      10/06/2011  
   
                                discharge planning 2011      10/19/2011  
   
                                                      
  
  
Overview:Formatting of this note might be different from the original.  
age 71,  lives in [place], [place]. No DC needs.  
/  
   
   
                                Pre-op testing  2011      10/04/2011  
   
                                                      
  
  
Overview:Formatting of this note is different from the original.  
Images from the original note were not included.  
[facility]  
PRE-OP CHECKLIST  
  
Surgeon: Deangelo Angulo M.D. Informed Consent Completed: No  
STS Score: 1.4%  
CAD: Yes - CAD on Problem List: Yes  
Is intended procedure a CABG: Yes - is a beta blocker ordered? Yes  
  
H & P completed: Yes  
  
PA/LAT: Completed CT: Completed MRI: N/A LE US: N/A  
  
Cath: Yes - reviewed: Yes Echo:Completed EKG: Completed EF %: 61  
  
PI's: N/A Carotid: Completed Mapping: N/A Dental: Completed PFT's: N/A  
  
Basename 11 0729  
WBC 7.18  
HB 13.1  
HCT 41.8  
  
INR 1.0  
CREAT 1.35  
  
UA: Normal  
HCG:N/A  
  
ABO/ABO Confirmed: Yes  
  
Blood ordered: No  
  
SA Swab: Yes - results: Pending  
  
Last Dose of Anticoagulation: vitamins  
  
Op Note: N/A  
  
Pacemaker Check: N/A  
  
Consults: GI 2001  
  
DM: No  
  
Cardiac Surgical prep: No  
  
SIGNATURE: Krystal Castellanos RN CHECKED BY:  
DATE of SERVICE: 2011  
TIME of SERVICE: 2:23 PM  
  
  
   
   
                                Anemia of chronic disease 2011  
   
                                Intestinal polyp 2011  
   
                                                      
  
  
Overview:Formatting of this note might be different from the original.  
Distal ileum ulcerated polyp.  
   
   
                                Nonspecific abnormal results of liver function s  
tudy 2006  
   
                                Impotence of organic origin 2006      09/0  
2015  
   
                                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  
                 2021  
  
documented as of this encounter (statuses as of 2023)  
[facility]06- History of Past illness Narrative*   
  
                                Problem         Noted Date      Resolved Date  
   
                                Medicare annual wellness visit, subsequent 2019  
   
                                Encounter for long-term (current) use of medicat  
ions 2018  
   
                                                      
  
  
Overview:Formatting of this note might be different from the original.  
Added automatically from request for surgery [id number]  
   
   
                                History of colonic polyps 2018  
   
                                                      
  
  
Overview:Formatting of this note might be different from the original.  
Added automatically from request for surgery [id number]  
   
   
                                Gastroesophageal reflux disease 2018  
   
                                                      
  
  
Overview:Formatting of this note might be different from the original.  
Added automatically from request for surgery [id number]  
   
   
                                Acute pulmonary edema 10/05/2011      10/06/2011  
   
                                                      
  
  
Overview:Formatting of this note might be different from the original.  
10/5/2011  
cardiogenic and noncardiogenic factors  
  
   
   
                                Atelectasis     10/05/2011      10/06/2011  
   
                                Hypotension     10/04/2011      10/06/2011  
   
                                                      
  
  
Overview:Formatting of this note might be different from the original.  
10/4/2011  
goal map 65-80  
   
   
                                Stress hyperglycemia 10/04/2011      10/06/2011  
   
                                                      
  
  
Overview:Formatting of this note might be different from the original.  
10/4/2011  
Perioperative insulin resistance and exacerbation of hyperglycemia.  
Control blood glucose with titration of insulin infusion  
  
10/5/2011  
adequate control  
goal FBG<180  
  
   
   
                                Post-op pain    10/04/2011      10/06/2011  
   
                                Mechanically assisted ventilation 10/04/2011      
  10/05/2011  
   
                                                      
  
  
Overview:Formatting of this note might be different from the original.  
10/4/2011  
optimize MV settings, WTE  
current setting:FiO2, 75%, peep 8  
   
   
                                Cardiac insufficiency 10/04/2011      10/05/2011  
   
                                                      
  
  
Overview:Formatting of this note might be different from the original.  
10/4/2011  
RV mild to moderate post pump  
goal CI>2.3  
   
   
                                Hypoxemia       10/04/2011      10/06/2011  
   
                                discharge planning 2011      10/19/2011  
   
                                                      
  
  
Overview:Formatting of this note might be different from the original.  
age 71,  lives in [place], [place]. No DC needs.  
/  
   
   
                                Pre-op testing  2011      10/04/2011  
   
                                                      
  
  
Overview:Formatting of this note is different from the original.  
Images from the original note were not included.  
[facility]  
PRE-OP CHECKLIST  
  
Surgeon: Deangelo Angulo M.D. Informed Consent Completed: No  
STS Score: 1.4%  
CAD: Yes - CAD on Problem List: Yes  
Is intended procedure a CABG: Yes - is a beta blocker ordered? Yes  
  
H & P completed: Yes  
  
PA/LAT: Completed CT: Completed MRI: N/A LE US: N/A  
  
Cath: Yes - reviewed: Yes Echo:Completed EKG: Completed EF %: 61  
  
PI's: N/A Carotid: Completed Mapping: N/A Dental: Completed PFT's: N/A  
  
Basename 11 0729  
WBC 7.18  
HB 13.1  
HCT 41.8  
  
INR 1.0  
CREAT 1.35  
  
UA: Normal  
HCG:N/A  
  
ABO/ABO Confirmed: Yes  
  
Blood ordered: No  
  
SA Swab: Yes - results: Pending  
  
Last Dose of Anticoagulation: vitamins  
  
Op Note: N/A  
  
Pacemaker Check: N/A  
  
Consults: GI 2001  
  
DM: No  
  
Cardiac Surgical prep: No  
  
SIGNATURE: Krystal Castellanos RN CHECKED BY:  
DATE of SERVICE: 2011  
TIME of SERVICE: 2:23 PM  
  
  
   
   
                                Anemia of chronic disease 2011  
   
                                Intestinal polyp 2011  
   
                                                      
  
  
Overview:Formatting of this note might be different from the original.  
Distal ileum ulcerated polyp.  
   
   
                                Nonspecific abnormal results of liver function s  
nialldy 2006  
   
                                Impotence of organic origin 2006  
   
                                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  
                 2021  
  
documented as of this encounter (statuses as of 2023)  
[facility]06- History of Past illness Narrative*   
  
                                Problem         Noted Date      Resolved Date  
   
                                Medicare annual wellness visit, subsequent 2019  
   
                                Encounter for long-term (current) use of medicat  
ions 2018  
   
                                                      
  
  
Overview:Formatting of this note might be different from the original.  
Added automatically from request for surgery [id number]  
   
   
                                History of colonic polyps 2018  
   
                                                      
  
  
Overview:Formatting of this note might be different from the original.  
Added automatically from request for surgery [id number]  
   
   
                                Gastroesophageal reflux disease 2018  
   
                                                      
  
  
Overview:Formatting of this note might be different from the original.  
Added automatically from request for surgery [id number]  
   
   
                                Acute pulmonary edema 10/05/2011      10/06/2011  
   
                                                      
  
  
Overview:Formatting of this note might be different from the original.  
10/5/2011  
cardiogenic and noncardiogenic factors  
  
   
   
                                Atelectasis     10/05/2011      10/06/2011  
   
                                Hypotension     10/04/2011      10/06/2011  
   
                                                      
  
  
Overview:Formatting of this note might be different from the original.  
10/4/2011  
goal map 65-80  
   
   
                                Stress hyperglycemia 10/04/2011      10/06/2011  
   
                                                      
  
  
Overview:Formatting of this note might be different from the original.  
10/4/2011  
Perioperative insulin resistance and exacerbation of hyperglycemia.  
Control blood glucose with titration of insulin infusion  
  
10/5/2011  
adequate control  
goal FBG<180  
  
   
   
                                Post-op pain    10/04/2011      10/06/2011  
   
                                Mechanically assisted ventilation 10/04/2011      
  10/05/2011  
   
                                                      
  
  
Overview:Formatting of this note might be different from the original.  
10/4/2011  
optimize MV settings, WTE  
current setting:FiO2, 75%, peep 8  
   
   
                                Cardiac insufficiency 10/04/2011      10/05/2011  
   
                                                      
  
  
Overview:Formatting of this note might be different from the original.  
10/4/2011  
RV mild to moderate post pump  
goal CI>2.3  
   
   
                                Hypoxemia       10/04/2011      10/06/2011  
   
                                discharge planning 2011      10/19/2011  
   
                                                      
  
  
Overview:Formatting of this note might be different from the original.  
age 71,  lives in [place], [place]. No DC needs.  
/  
   
   
                                Pre-op testing  2011      10/04/2011  
   
                                                      
  
  
Overview:Formatting of this note is different from the original.  
Images from the original note were not included.  
[facility]  
PRE-OP CHECKLIST  
  
Surgeon: Deangelo Angulo M.D. Informed Consent Completed: No  
STS Score: 1.4%  
CAD: Yes - CAD on Problem List: Yes  
Is intended procedure a CABG: Yes - is a beta blocker ordered? Yes  
  
H & P completed: Yes  
  
PA/LAT: Completed CT: Completed MRI: N/A LE US: N/A  
  
Cath: Yes - reviewed: Yes Echo:Completed EKG: Completed EF %: 61  
  
PI's: N/A Carotid: Completed Mapping: N/A Dental: Completed PFT's: N/A  
  
Basename 11 0729  
WBC 7.18  
HB 13.1  
HCT 41.8  
  
INR 1.0  
CREAT 1.35  
  
UA: Normal  
HCG:N/A  
  
ABO/ABO Confirmed: Yes  
  
Blood ordered: No  
  
SA Swab: Yes - results: Pending  
  
Last Dose of Anticoagulation: vitamins  
  
Op Note: N/A  
  
Pacemaker Check: N/A  
  
Consults: GI 2001  
  
DM: No  
  
Cardiac Surgical prep: No  
  
SIGNATURE: Krystal Castellanos RN CHECKED BY:  
DATE of SERVICE: 2011  
TIME of SERVICE: 2:23 PM  
  
  
   
   
                                Anemia of chronic disease 2011  
   
                                Intestinal polyp 2011  
   
                                                      
  
  
Overview:Formatting of this note might be different from the original.  
Distal ileum ulcerated polyp.  
   
   
                                Nonspecific abnormal results of liver function s  
tudy 2006  
   
                                Impotence of organic origin 2006  
   
                                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  
                 2021  
  
documented as of this encounter (statuses as of 2023)  
[facility]06- History of Past illness Narrative*   
  
                                Problem         Noted Date      Resolved Date  
   
                                Medicare annual wellness visit, subsequent 2019  
   
                                Encounter for long-term (current) use of medicat  
ions 2018  
   
                                                      
  
  
Overview:Formatting of this note might be different from the original.  
Added automatically from request for surgery [id number]  
   
   
                                History of colonic polyps 2018  
   
                                                      
  
  
Overview:Formatting of this note might be different from the original.  
Added automatically from request for surgery [id number]  
   
   
                                Gastroesophageal reflux disease 2018  
   
                                                      
  
  
Overview:Formatting of this note might be different from the original.  
Added automatically from request for surgery [id number]  
   
   
                                Acute pulmonary edema 10/05/2011      10/06/2011  
   
                                                      
  
  
Overview:Formatting of this note might be different from the original.  
10/5/2011  
cardiogenic and noncardiogenic factors  
  
   
   
                                Atelectasis     10/05/2011      10/06/2011  
   
                                Hypotension     10/04/2011      10/06/2011  
   
                                                      
  
  
Overview:Formatting of this note might be different from the original.  
10/4/2011  
goal map 65-80  
   
   
                                Stress hyperglycemia 10/04/2011      10/06/2011  
   
                                                      
  
  
Overview:Formatting of this note might be different from the original.  
10/4/2011  
Perioperative insulin resistance and exacerbation of hyperglycemia.  
Control blood glucose with titration of insulin infusion  
  
10/5/2011  
adequate control  
goal FBG<180  
  
   
   
                                Post-op pain    10/04/2011      10/06/2011  
   
                                Mechanically assisted ventilation 10/04/2011      
  10/05/2011  
   
                                                      
  
  
Overview:Formatting of this note might be different from the original.  
10/4/2011  
optimize MV settings, WTE  
current setting:FiO2, 75%, peep 8  
   
   
                                Cardiac insufficiency 10/04/2011      10/05/2011  
   
                                                      
  
  
Overview:Formatting of this note might be different from the original.  
10/4/2011  
RV mild to moderate post pump  
goal CI>2.3  
   
   
                                Hypoxemia       10/04/2011      10/06/2011  
   
                                discharge planning 2011      10/19/2011  
   
                                                      
  
  
Overview:Formatting of this note might be different from the original.  
age 71,  lives in [place], [place]. No DC needs.  
/  
   
   
                                Pre-op testing  2011      10/04/2011  
   
                                                      
  
  
Overview:Formatting of this note is different from the original.  
Images from the original note were not included.  
[facility]  
PRE-OP CHECKLIST  
  
Surgeon: Deangelo Angulo M.D. Informed Consent Completed: No  
STS Score: 1.4%  
CAD: Yes - CAD on Problem List: Yes  
Is intended procedure a CABG: Yes - is a beta blocker ordered? Yes  
  
H & P completed: Yes  
  
PA/LAT: Completed CT: Completed MRI: N/A LE US: N/A  
  
Cath: Yes - reviewed: Yes Echo:Completed EKG: Completed EF %: 61  
  
PI's: N/A Carotid: Completed Mapping: N/A Dental: Completed PFT's: N/A  
  
Basename 11 0729  
WBC 7.18  
HB 13.1  
HCT 41.8  
  
INR 1.0  
CREAT 1.35  
  
UA: Normal  
HCG:N/A  
  
ABO/ABO Confirmed: Yes  
  
Blood ordered: No  
  
SA Swab: Yes - results: Pending  
  
Last Dose of Anticoagulation: vitamins  
  
Op Note: N/A  
  
Pacemaker Check: N/A  
  
Consults: GI 2001  
  
DM: No  
  
Cardiac Surgical prep: No  
  
SIGNATURE: Krystal Castellanos RN CHECKED BY:  
DATE of SERVICE: 2011  
TIME of SERVICE: 2:23 PM  
  
  
   
   
                                Anemia of chronic disease 2011  
   
                                Intestinal polyp 2011  
   
                                                      
  
  
Overview:Formatting of this note might be different from the original.  
Distal ileum ulcerated polyp.  
   
   
                                Nonspecific abnormal results of liver function s  
tudy 2006  
   
                                Impotence of organic origin 2006  
   
                                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  
                 2021  
  
documented as of this encounter (statuses as of 2023)  
[facility]- History of Past illness Narrative*   
  
                                Problem         Noted Date      Resolved Date  
   
                                Medicare annual wellness visit, subsequent 2019  
   
                                Encounter for long-term (current) use of medicat  
ions 2018  
   
                                                      
  
  
Overview:Formatting of this note might be different from the original.  
Added automatically from request for surgery [id number]  
   
   
                                History of colonic polyps 2018  
   
                                                      
  
  
Overview:Formatting of this note might be different from the original.  
Added automatically from request for surgery [id number]  
   
   
                                Gastroesophageal reflux disease 2018  
   
                                                      
  
  
Overview:Formatting of this note might be different from the original.  
Added automatically from request for surgery [id number]  
   
   
                                Acute pulmonary edema 10/05/2011      10/06/2011  
   
                                                      
  
  
Overview:Formatting of this note might be different from the original.  
10/5/2011  
cardiogenic and noncardiogenic factors  
  
   
   
                                Atelectasis     10/05/2011      10/06/2011  
   
                                Hypotension     10/04/2011      10/06/2011  
   
                                                      
  
  
Overview:Formatting of this note might be different from the original.  
10/4/2011  
goal map 65-80  
   
   
                                Stress hyperglycemia 10/04/2011      10/06/2011  
   
                                                      
  
  
Overview:Formatting of this note might be different from the original.  
10/4/2011  
Perioperative insulin resistance and exacerbation of hyperglycemia.  
Control blood glucose with titration of insulin infusion  
  
10/5/2011  
adequate control  
goal FBG<180  
  
   
   
                                Post-op pain    10/04/2011      10/06/2011  
   
                                Mechanically assisted ventilation 10/04/2011      
  10/05/2011  
   
                                                      
  
  
Overview:Formatting of this note might be different from the original.  
10/4/2011  
optimize MV settings, WTE  
current setting:FiO2, 75%, peep 8  
   
   
                                Cardiac insufficiency 10/04/2011      10/05/2011  
   
                                                      
  
  
Overview:Formatting of this note might be different from the original.  
10/4/2011  
RV mild to moderate post pump  
goal CI>2.3  
   
   
                                Hypoxemia       10/04/2011      10/06/2011  
   
                                discharge planning 2011      10/19/2011  
   
                                                      
  
  
Overview:Formatting of this note might be different from the original.  
age 71,  lives in [place], [place]. No DC needs.  
/  
   
   
                                Pre-op testing  2011      10/04/2011  
   
                                                      
  
  
Overview:Formatting of this note is different from the original.  
Images from the original note were not included.  
[facility]  
PRE-OP CHECKLIST  
  
Surgeon: Deangelo Angulo M.D. Informed Consent Completed: No  
STS Score: 1.4%  
CAD: Yes - CAD on Problem List: Yes  
Is intended procedure a CABG: Yes - is a beta blocker ordered? Yes  
  
H & P completed: Yes  
  
PA/LAT: Completed CT: Completed MRI: N/A LE US: N/A  
  
Cath: Yes - reviewed: Yes Echo:Completed EKG: Completed EF %: 61  
  
PI's: N/A Carotid: Completed Mapping: N/A Dental: Completed PFT's: N/A  
  
Basename 11 0729  
WBC 7.18  
HB 13.1  
HCT 41.8  
  
INR 1.0  
CREAT 1.35  
  
UA: Normal  
HCG:N/A  
  
ABO/ABO Confirmed: Yes  
  
Blood ordered: No  
  
SA Swab: Yes - results: Pending  
  
Last Dose of Anticoagulation: vitamins  
  
Op Note: N/A  
  
Pacemaker Check: N/A  
  
Consults: GI 2001  
  
DM: No  
  
Cardiac Surgical prep: No  
  
SIGNATURE: Krystal Castellanos RN CHECKED BY:  
DATE of SERVICE: 2011  
TIME of SERVICE: 2:23 PM  
  
  
   
   
                                Anemia of chronic disease 2011  
   
                                Intestinal polyp 2011  
   
                                                      
  
  
Overview:Formatting of this note might be different from the original.  
Distal ileum ulcerated polyp.  
   
   
                                Nonspecific abnormal results of liver function s  
tudy 2006  
   
                                Impotence of organic origin 2006  
   
                                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  
                 2021  
  
documented as of this encounter (statuses as of 2023)  
[facility]- History of Past illness Narrative*   
  
                                Problem         Noted Date      Resolved Date  
   
                                Medicare annual wellness visit, subsequent 2019  
   
                                Encounter for long-term (current) use of medicat  
ions 2018  
   
                                                      
  
  
Overview:Formatting of this note might be different from the original.  
Added automatically from request for surgery [id number]  
   
   
                                History of colonic polyps 2018  
   
                                                      
  
  
Overview:Formatting of this note might be different from the original.  
Added automatically from request for surgery [id number]  
   
   
                                Gastroesophageal reflux disease 2018  
   
                                                      
  
  
Overview:Formatting of this note might be different from the original.  
Added automatically from request for surgery [id number]  
   
   
                                Acute pulmonary edema 10/05/2011      10/06/2011  
   
                                                      
  
  
Overview:Formatting of this note might be different from the original.  
10/5/2011  
cardiogenic and noncardiogenic factors  
  
   
   
                                Atelectasis     10/05/2011      10/06/2011  
   
                                Hypotension     10/04/2011      10/06/2011  
   
                                                      
  
  
Overview:Formatting of this note might be different from the original.  
10/4/2011  
goal map 65-80  
   
   
                                Stress hyperglycemia 10/04/2011      10/06/2011  
   
                                                      
  
  
Overview:Formatting of this note might be different from the original.  
10/4/2011  
Perioperative insulin resistance and exacerbation of hyperglycemia.  
Control blood glucose with titration of insulin infusion  
  
10/5/2011  
adequate control  
goal FBG<180  
  
   
   
                                Post-op pain    10/04/2011      10/06/2011  
   
                                Mechanically assisted ventilation 10/04/2011      
  10/05/2011  
   
                                                      
  
  
Overview:Formatting of this note might be different from the original.  
10/4/2011  
optimize MV settings, WTE  
current setting:FiO2, 75%, peep 8  
   
   
                                Cardiac insufficiency 10/04/2011      10/05/2011  
   
                                                      
  
  
Overview:Formatting of this note might be different from the original.  
10/4/2011  
RV mild to moderate post pump  
goal CI>2.3  
   
   
                                Hypoxemia       10/04/2011      10/06/2011  
   
                                discharge planning 2011      10/19/2011  
   
                                                      
  
  
Overview:Formatting of this note might be different from the original.  
age 71,  lives in [place], [place]. No DC needs.  
/  
   
   
                                Pre-op testing  2011      10/04/2011  
   
                                                      
  
  
Overview:Formatting of this note is different from the original.  
Images from the original note were not included.  
[facility]  
PRE-OP CHECKLIST  
  
Surgeon: Deangelo Angulo M.D. Informed Consent Completed: No  
STS Score: 1.4%  
CAD: Yes - CAD on Problem List: Yes  
Is intended procedure a CABG: Yes - is a beta blocker ordered? Yes  
  
H & P completed: Yes  
  
PA/LAT: Completed CT: Completed MRI: N/A LE US: N/A  
  
Cath: Yes - reviewed: Yes Echo:Completed EKG: Completed EF %: 61  
  
PI's: N/A Carotid: Completed Mapping: N/A Dental: Completed PFT's: N/A  
  
Basename 11 0729  
WBC 7.18  
HB 13.1  
HCT 41.8  
  
INR 1.0  
CREAT 1.35  
  
UA: Normal  
HCG:N/A  
  
ABO/ABO Confirmed: Yes  
  
Blood ordered: No  
  
SA Swab: Yes - results: Pending  
  
Last Dose of Anticoagulation: vitamins  
  
Op Note: N/A  
  
Pacemaker Check: N/A  
  
Consults: GI 2001  
  
DM: No  
  
Cardiac Surgical prep: No  
  
SIGNATURE: Krystal Castellanos RN CHECKED BY:  
DATE of SERVICE: 2011  
TIME of SERVICE: 2:23 PM  
  
  
   
   
                                Anemia of chronic disease 2011  
   
                                Intestinal polyp 2011  
   
                                                      
  
  
Overview:Formatting of this note might be different from the original.  
Distal ileum ulcerated polyp.  
   
   
                                Nonspecific abnormal results of liver function s  
nialldy 2006  
   
                                Impotence of organic origin 2006  
   
                                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  
                 2021  
  
documented as of this encounter (statuses as of 03/10/2023)  
[facility]06- History of Past illness Narrative*   
  
                                Problem         Noted Date      Resolved Date  
   
                                Medicare annual wellness visit, subsequent 2019  
   
                                Encounter for long-term (current) use of medicat  
ions 2018  
   
                                                      
  
  
Overview:Formatting of this note might be different from the original.  
Added automatically from request for surgery [id number]  
   
   
                                History of colonic polyps 2018  
   
                                                      
  
  
Overview:Formatting of this note might be different from the original.  
Added automatically from request for surgery [id number]  
   
   
                                Gastroesophageal reflux disease 2018  
   
                                                      
  
  
Overview:Formatting of this note might be different from the original.  
Added automatically from request for surgery [id number]  
   
   
                                Acute pulmonary edema 10/05/2011      10/06/2011  
   
                                                      
  
  
Overview:Formatting of this note might be different from the original.  
10/5/2011  
cardiogenic and noncardiogenic factors  
  
   
   
                                Atelectasis     10/05/2011      10/06/2011  
   
                                Hypotension     10/04/2011      10/06/2011  
   
                                                      
  
  
Overview:Formatting of this note might be different from the original.  
10/4/2011  
goal map 65-80  
   
   
                                Stress hyperglycemia 10/04/2011      10/06/2011  
   
                                                      
  
  
Overview:Formatting of this note might be different from the original.  
10/4/2011  
Perioperative insulin resistance and exacerbation of hyperglycemia.  
Control blood glucose with titration of insulin infusion  
  
10/5/2011  
adequate control  
goal FBG<180  
  
   
   
                                Post-op pain    10/04/2011      10/06/2011  
   
                                Mechanically assisted ventilation 10/04/2011      
  10/05/2011  
   
                                                      
  
  
Overview:Formatting of this note might be different from the original.  
10/4/2011  
optimize MV settings, WTE  
current setting:FiO2, 75%, peep 8  
   
   
                                Cardiac insufficiency 10/04/2011      10/05/2011  
   
                                                      
  
  
Overview:Formatting of this note might be different from the original.  
10/4/2011  
RV mild to moderate post pump  
goal CI>2.3  
   
   
                                Hypoxemia       10/04/2011      10/06/2011  
   
                                discharge planning 2011      10/19/2011  
   
                                                      
  
  
Overview:Formatting of this note might be different from the original.  
age 71,  lives in [place], [place]. No DC needs.  
/  
   
   
                                Pre-op testing  2011      10/04/2011  
   
                                                      
  
  
Overview:Formatting of this note is different from the original.  
Images from the original note were not included.  
[facility]  
PRE-OP CHECKLIST  
  
Surgeon: Deangelo Angulo M.D. Informed Consent Completed: No  
STS Score: 1.4%  
CAD: Yes - CAD on Problem List: Yes  
Is intended procedure a CABG: Yes - is a beta blocker ordered? Yes  
  
H & P completed: Yes  
  
PA/LAT: Completed CT: Completed MRI: N/A LE US: N/A  
  
Cath: Yes - reviewed: Yes Echo:Completed EKG: Completed EF %: 61  
  
PI's: N/A Carotid: Completed Mapping: N/A Dental: Completed PFT's: N/A  
  
Basename 11 0729  
WBC 7.18  
HB 13.1  
HCT 41.8  
  
INR 1.0  
CREAT 1.35  
  
UA: Normal  
HCG:N/A  
  
ABO/ABO Confirmed: Yes  
  
Blood ordered: No  
  
SA Swab: Yes - results: Pending  
  
Last Dose of Anticoagulation: vitamins  
  
Op Note: N/A  
  
Pacemaker Check: N/A  
  
Consults: GI 2001  
  
DM: No  
  
Cardiac Surgical prep: No  
  
SIGNATURE: Krystal Castellanos RN CHECKED BY:  
DATE of SERVICE: 2011  
TIME of SERVICE: 2:23 PM  
  
  
   
   
                                Anemia of chronic disease 2011  
   
                                Intestinal polyp 2011  
   
                                                      
  
  
Overview:Formatting of this note might be different from the original.  
Distal ileum ulcerated polyp.  
   
   
                                Nonspecific abnormal results of liver function s  
tudy 2006  
   
                                Impotence of organic origin 2006  
   
                                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  
                 2021  
  
documented as of this encounter (statuses as of 2023)  
[facility]- History of Past illness Narrative*   
  
                                Problem         Noted Date      Resolved Date  
   
                                Medicare annual wellness visit, subsequent 2019  
   
                                Encounter for long-term (current) use of medicat  
ions 2018  
   
                                                      
  
  
Overview:Formatting of this note might be different from the original.  
Added automatically from request for surgery [id number]  
   
   
                                History of colonic polyps 2018  
   
                                                      
  
  
Overview:Formatting of this note might be different from the original.  
Added automatically from request for surgery [id number]  
   
   
                                Gastroesophageal reflux disease 2018  
   
                                                      
  
  
Overview:Formatting of this note might be different from the original.  
Added automatically from request for surgery [id number]  
   
   
                                Acute pulmonary edema 10/05/2011      10/06/2011  
   
                                                      
  
  
Overview:Formatting of this note might be different from the original.  
10/5/2011  
cardiogenic and noncardiogenic factors  
  
   
   
                                Atelectasis     10/05/2011      10/06/2011  
   
                                Hypotension     10/04/2011      10/06/2011  
   
                                                      
  
  
Overview:Formatting of this note might be different from the original.  
10/4/2011  
goal map 65-80  
   
   
                                Stress hyperglycemia 10/04/2011      10/06/2011  
   
                                                      
  
  
Overview:Formatting of this note might be different from the original.  
10/4/2011  
Perioperative insulin resistance and exacerbation of hyperglycemia.  
Control blood glucose with titration of insulin infusion  
  
10/5/2011  
adequate control  
goal FBG<180  
  
   
   
                                Post-op pain    10/04/2011      10/06/2011  
   
                                Mechanically assisted ventilation 10/04/2011      
  10/05/2011  
   
                                                      
  
  
Overview:Formatting of this note might be different from the original.  
10/4/2011  
optimize MV settings, WTE  
current setting:FiO2, 75%, peep 8  
   
   
                                Cardiac insufficiency 10/04/2011      10/05/2011  
   
                                                      
  
  
Overview:Formatting of this note might be different from the original.  
10/4/2011  
RV mild to moderate post pump  
goal CI>2.3  
   
   
                                Hypoxemia       10/04/2011      10/06/2011  
   
                                discharge planning 2011      10/19/2011  
   
                                                      
  
  
Overview:Formatting of this note might be different from the original.  
age 71,  lives in [place], [place]. No DC needs.  
/  
   
   
                                Pre-op testing  2011      10/04/2011  
   
                                                      
  
  
Overview:Formatting of this note is different from the original.  
Images from the original note were not included.  
[facility]  
PRE-OP CHECKLIST  
  
Surgeon: Deangelo Angulo M.D. Informed Consent Completed: No  
STS Score: 1.4%  
CAD: Yes - CAD on Problem List: Yes  
Is intended procedure a CABG: Yes - is a beta blocker ordered? Yes  
  
H & P completed: Yes  
  
PA/LAT: Completed CT: Completed MRI: N/A LE US: N/A  
  
Cath: Yes - reviewed: Yes Echo:Completed EKG: Completed EF %: 61  
  
PI's: N/A Carotid: Completed Mapping: N/A Dental: Completed PFT's: N/A  
  
Basename 11 0729  
WBC 7.18  
HB 13.1  
HCT 41.8  
  
INR 1.0  
CREAT 1.35  
  
UA: Normal  
HCG:N/A  
  
ABO/ABO Confirmed: Yes  
  
Blood ordered: No  
  
SA Swab: Yes - results: Pending  
  
Last Dose of Anticoagulation: vitamins  
  
Op Note: N/A  
  
Pacemaker Check: N/A  
  
Consults: GI 2001  
  
DM: No  
  
Cardiac Surgical prep: No  
  
SIGNATURE: Krystal Castellanos RN CHECKED BY:  
DATE of SERVICE: 2011  
TIME of SERVICE: 2:23 PM  
  
  
   
   
                                Anemia of chronic disease 2011  
   
                                Intestinal polyp 2011  
   
                                                      
  
  
Overview:Formatting of this note might be different from the original.  
Distal ileum ulcerated polyp.  
   
   
                                Nonspecific abnormal results of liver function s  
tudy 2006  
   
                                Impotence of organic origin 2006  
   
                                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  
                 2021  
  
documented as of this encounter (statuses as of 2023)  
[facility]- History of Past illness Narrative*   
  
                                Problem         Noted Date      Resolved Date  
   
                                Medicare annual wellness visit, subsequent 2019  
   
                                Encounter for long-term (current) use of medicat  
ions 2018  
   
                                                      
  
  
Overview:Formatting of this note might be different from the original.  
Added automatically from request for surgery [id number]  
   
   
                                History of colonic polyps 2018  
   
                                                      
  
  
Overview:Formatting of this note might be different from the original.  
Added automatically from request for surgery [id number]  
   
   
                                Gastroesophageal reflux disease 2018  
   
                                                      
  
  
Overview:Formatting of this note might be different from the original.  
Added automatically from request for surgery [id number]  
   
   
                                Acute pulmonary edema 10/05/2011      10/06/2011  
   
                                                      
  
  
Overview:Formatting of this note might be different from the original.  
10/5/2011  
cardiogenic and noncardiogenic factors  
  
   
   
                                Atelectasis     10/05/2011      10/06/2011  
   
                                Hypotension     10/04/2011      10/06/2011  
   
                                                      
  
  
Overview:Formatting of this note might be different from the original.  
10/4/2011  
goal map 65-80  
   
   
                                Stress hyperglycemia 10/04/2011      10/06/2011  
   
                                                      
  
  
Overview:Formatting of this note might be different from the original.  
10/4/2011  
Perioperative insulin resistance and exacerbation of hyperglycemia.  
Control blood glucose with titration of insulin infusion  
  
10/5/2011  
adequate control  
goal FBG<180  
  
   
   
                                Post-op pain    10/04/2011      10/06/2011  
   
                                Mechanically assisted ventilation 10/04/2011      
  10/05/2011  
   
                                                      
  
  
Overview:Formatting of this note might be different from the original.  
10/4/2011  
optimize MV settings, WTE  
current setting:FiO2, 75%, peep 8  
   
   
                                Cardiac insufficiency 10/04/2011      10/05/2011  
   
                                                      
  
  
Overview:Formatting of this note might be different from the original.  
10/4/2011  
RV mild to moderate post pump  
goal CI>2.3  
   
   
                                Hypoxemia       10/04/2011      10/06/2011  
   
                                discharge planning 2011      10/19/2011  
   
                                                      
  
  
Overview:Formatting of this note might be different from the original.  
age 71,  lives in [place], [place]. No DC needs.  
/  
   
   
                                Pre-op testing  2011      10/04/2011  
   
                                                      
  
  
Overview:Formatting of this note is different from the original.  
Images from the original note were not included.  
[facility]  
PRE-OP CHECKLIST  
  
Surgeon: Deangelo Angulo M.D. Informed Consent Completed: No  
STS Score: 1.4%  
CAD: Yes - CAD on Problem List: Yes  
Is intended procedure a CABG: Yes - is a beta blocker ordered? Yes  
  
H & P completed: Yes  
  
PA/LAT: Completed CT: Completed MRI: N/A LE US: N/A  
  
Cath: Yes - reviewed: Yes Echo:Completed EKG: Completed EF %: 61  
  
PI's: N/A Carotid: Completed Mapping: N/A Dental: Completed PFT's: N/A  
  
Basename 11 0729  
WBC 7.18  
HB 13.1  
HCT 41.8  
  
INR 1.0  
CREAT 1.35  
  
UA: Normal  
HCG:N/A  
  
ABO/ABO Confirmed: Yes  
  
Blood ordered: No  
  
SA Swab: Yes - results: Pending  
  
Last Dose of Anticoagulation: vitamins  
  
Op Note: N/A  
  
Pacemaker Check: N/A  
  
Consults: GI 2001  
  
DM: No  
  
Cardiac Surgical prep: No  
  
SIGNATURE: Krystal Castellanos RN CHECKED BY:  
DATE of SERVICE: 2011  
TIME of SERVICE: 2:23 PM  
  
  
   
   
                                Anemia of chronic disease 2011  
   
                                Intestinal polyp 2011  
   
                                                      
  
  
Overview:Formatting of this note might be different from the original.  
Distal ileum ulcerated polyp.  
   
   
                                Nonspecific abnormal results of liver function s  
tudy 2006  
   
                                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  
                 2021  
  
documented as of this encounter (statuses as of 2023)  
[facility]- History of Past illness Narrative*   
  
                                Problem         Noted Date      Resolved Date  
   
                                Medicare annual wellness visit, subsequent 2019  
   
                                Encounter for long-term (current) use of medicat  
ions 2018  
   
                                                      
  
  
Overview:Formatting of this note might be different from the original.  
Added automatically from request for surgery [id number]  
   
   
                                History of colonic polyps 2018  
   
                                                      
  
  
Overview:Formatting of this note might be different from the original.  
Added automatically from request for surgery [id number]  
   
   
                                Gastroesophageal reflux disease 2018  
   
                                                      
  
  
Overview:Formatting of this note might be different from the original.  
Added automatically from request for surgery [id number]  
   
   
                                Acute pulmonary edema 10/05/2011      10/06/2011  
   
                                                      
  
  
Overview:Formatting of this note might be different from the original.  
10/5/2011  
cardiogenic and noncardiogenic factors  
  
   
   
                                Atelectasis     10/05/2011      10/06/2011  
   
                                Hypotension     10/04/2011      10/06/2011  
   
                                                      
  
  
Overview:Formatting of this note might be different from the original.  
10/4/2011  
goal map 65-80  
   
   
                                Stress hyperglycemia 10/04/2011      10/06/2011  
   
                                                      
  
  
Overview:Formatting of this note might be different from the original.  
10/4/2011  
Perioperative insulin resistance and exacerbation of hyperglycemia.  
Control blood glucose with titration of insulin infusion  
  
10/5/2011  
adequate control  
goal FBG<180  
  
   
   
                                Post-op pain    10/04/2011      10/06/2011  
   
                                Mechanically assisted ventilation 10/04/2011      
  10/05/2011  
   
                                                      
  
  
Overview:Formatting of this note might be different from the original.  
10/4/2011  
optimize MV settings, WTE  
current setting:FiO2, 75%, peep 8  
   
   
                                Cardiac insufficiency 10/04/2011      10/05/2011  
   
                                                      
  
  
Overview:Formatting of this note might be different from the original.  
10/4/2011  
RV mild to moderate post pump  
goal CI>2.3  
   
   
                                Hypoxemia       10/04/2011      10/06/2011  
   
                                discharge planning 2011      10/19/2011  
   
                                                      
  
  
Overview:Formatting of this note might be different from the original.  
age 71,  lives in [place], [place]. No DC needs.  
/  
   
   
                                Pre-op testing  2011      10/04/2011  
   
                                                      
  
  
Overview:Formatting of this note is different from the original.  
Images from the original note were not included.  
[facility]  
PRE-OP CHECKLIST  
  
Surgeon: Deangelo Angulo M.D. Informed Consent Completed: No  
STS Score: 1.4%  
CAD: Yes - CAD on Problem List: Yes  
Is intended procedure a CABG: Yes - is a beta blocker ordered? Yes  
  
H & P completed: Yes  
  
PA/LAT: Completed CT: Completed MRI: N/A LE US: N/A  
  
Cath: Yes - reviewed: Yes Echo:Completed EKG: Completed EF %: 61  
  
PI's: N/A Carotid: Completed Mapping: N/A Dental: Completed PFT's: N/A  
  
Basename 11 0729  
WBC 7.18  
HB 13.1  
HCT 41.8  
  
INR 1.0  
CREAT 1.35  
  
UA: Normal  
HCG:N/A  
  
ABO/ABO Confirmed: Yes  
  
Blood ordered: No  
  
SA Swab: Yes - results: Pending  
  
Last Dose of Anticoagulation: vitamins  
  
Op Note: N/A  
  
Pacemaker Check: N/A  
  
Consults: GI 2001  
  
DM: No  
  
Cardiac Surgical prep: No  
  
SIGNATURE: Krystal Castellanos RN CHECKED BY:  
DATE of SERVICE: 2011  
TIME of SERVICE: 2:23 PM  
  
  
   
   
                                Anemia of chronic disease 2011  
   
                                Intestinal polyp 2011  
   
                                                      
  
  
Overview:Formatting of this note might be different from the original.  
Distal ileum ulcerated polyp.  
   
   
                                Nonspecific abnormal results of liver function s  
pepe 2006  
   
                                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  
                 2021  
  
documented as of this encounter (statuses as of 2023)  
[facility]- History of Past illness Narrative*   
  
                                Problem         Noted Date      Resolved Date  
   
                                Medicare annual wellness visit, subsequent 2019  
   
                                Encounter for long-term (current) use of medicat  
ions 2018  
   
                                                      
  
  
Overview:Formatting of this note might be different from the original.  
Added automatically from request for surgery [id number]  
   
   
                                History of colonic polyps 2018  
   
                                                      
  
  
Overview:Formatting of this note might be different from the original.  
Added automatically from request for surgery [id number]  
   
   
                                Gastroesophageal reflux disease 2018  
   
                                                      
  
  
Overview:Formatting of this note might be different from the original.  
Added automatically from request for surgery [id number]  
   
   
                                Acute pulmonary edema 10/05/2011      10/06/2011  
   
                                                      
  
  
Overview:Formatting of this note might be different from the original.  
10/5/2011  
cardiogenic and noncardiogenic factors  
  
   
   
                                Atelectasis     10/05/2011      10/06/2011  
   
                                Hypotension     10/04/2011      10/06/2011  
   
                                                      
  
  
Overview:Formatting of this note might be different from the original.  
10/4/2011  
goal map 65-80  
   
   
                                Stress hyperglycemia 10/04/2011      10/06/2011  
   
                                                      
  
  
Overview:Formatting of this note might be different from the original.  
10/4/2011  
Perioperative insulin resistance and exacerbation of hyperglycemia.  
Control blood glucose with titration of insulin infusion  
  
10/5/2011  
adequate control  
goal FBG<180  
  
   
   
                                Post-op pain    10/04/2011      10/06/2011  
   
                                Mechanically assisted ventilation 10/04/2011      
  10/05/2011  
   
                                                      
  
  
Overview:Formatting of this note might be different from the original.  
10/4/2011  
optimize MV settings, WTE  
current setting:FiO2, 75%, peep 8  
   
   
                                Cardiac insufficiency 10/04/2011      10/05/2011  
   
                                                      
  
  
Overview:Formatting of this note might be different from the original.  
10/4/2011  
RV mild to moderate post pump  
goal CI>2.3  
   
   
                                Hypoxemia       10/04/2011      10/06/2011  
   
                                discharge planning 2011      10/19/2011  
   
                                                      
  
  
Overview:Formatting of this note might be different from the original.  
age 71,  lives in [place], [place]. No DC needs.  
/  
   
   
                                Pre-op testing  2011      10/04/2011  
   
                                                      
  
  
Overview:Formatting of this note is different from the original.  
Images from the original note were not included.  
[facility]  
PRE-OP CHECKLIST  
  
Surgeon: Deangelo Angulo M.D. Informed Consent Completed: No  
STS Score: 1.4%  
CAD: Yes - CAD on Problem List: Yes  
Is intended procedure a CABG: Yes - is a beta blocker ordered? Yes  
  
H & P completed: Yes  
  
PA/LAT: Completed CT: Completed MRI: N/A LE US: N/A  
  
Cath: Yes - reviewed: Yes Echo:Completed EKG: Completed EF %: 61  
  
PI's: N/A Carotid: Completed Mapping: N/A Dental: Completed PFT's: N/A  
  
Basename 11 0729  
WBC 7.18  
HB 13.1  
HCT 41.8  
  
INR 1.0  
CREAT 1.35  
  
UA: Normal  
HCG:N/A  
  
ABO/ABO Confirmed: Yes  
  
Blood ordered: No  
  
SA Swab: Yes - results: Pending  
  
Last Dose of Anticoagulation: vitamins  
  
Op Note: N/A  
  
Pacemaker Check: N/A  
  
Consults: GI 2001  
  
DM: No  
  
Cardiac Surgical prep: No  
  
SIGNATURE: Krystal Castellanos RN CHECKED BY:  
DATE of SERVICE: 2011  
TIME of SERVICE: 2:23 PM  
  
  
   
   
                                Anemia of chronic disease 2011  
   
                                Intestinal polyp 2011  
   
                                                      
  
  
Overview:Formatting of this note might be different from the original.  
Distal ileum ulcerated polyp.  
   
   
                                Nonspecific abnormal results of liver function s  
tudy 2006  
   
                                Impotence of organic origin 2006  
   
                                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  
                 2021  
  
documented as of this encounter (statuses as of 2023)  
[facility]06- History of Past illness Narrative*   
  
                                Problem         Noted Date      Resolved Date  
   
                                Medicare annual wellness visit, subsequent 2019  
   
                                Encounter for long-term (current) use of medicat  
ions 2018  
   
                                                      
  
  
Overview:Formatting of this note might be different from the original.  
Added automatically from request for surgery [id number]  
   
   
                                History of colonic polyps 2018  
   
                                                      
  
  
Overview:Formatting of this note might be different from the original.  
Added automatically from request for surgery [id number]  
   
   
                                Gastroesophageal reflux disease 2018  
   
                                                      
  
  
Overview:Formatting of this note might be different from the original.  
Added automatically from request for surgery [id number]  
   
   
                                Acute pulmonary edema 10/05/2011      10/06/2011  
   
                                                      
  
  
Overview:Formatting of this note might be different from the original.  
10/5/2011  
cardiogenic and noncardiogenic factors  
  
   
   
                                Atelectasis     10/05/2011      10/06/2011  
   
                                Hypotension     10/04/2011      10/06/2011  
   
                                                      
  
  
Overview:Formatting of this note might be different from the original.  
10/4/2011  
goal map 65-80  
   
   
                                Stress hyperglycemia 10/04/2011      10/06/2011  
   
                                                      
  
  
Overview:Formatting of this note might be different from the original.  
10/4/2011  
Perioperative insulin resistance and exacerbation of hyperglycemia.  
Control blood glucose with titration of insulin infusion  
  
10/5/2011  
adequate control  
goal FBG<180  
  
   
   
                                Post-op pain    10/04/2011      10/06/2011  
   
                                Mechanically assisted ventilation 10/04/2011      
  10/05/2011  
   
                                                      
  
  
Overview:Formatting of this note might be different from the original.  
10/4/2011  
optimize MV settings, WTE  
current setting:FiO2, 75%, peep 8  
   
   
                                Cardiac insufficiency 10/04/2011      10/05/2011  
   
                                                      
  
  
Overview:Formatting of this note might be different from the original.  
10/4/2011  
RV mild to moderate post pump  
goal CI>2.3  
   
   
                                Hypoxemia       10/04/2011      10/06/2011  
   
                                discharge planning 2011      10/19/2011  
   
                                                      
  
  
Overview:Formatting of this note might be different from the original.  
age 71,  lives in [place], [place]. No DC needs.  
/  
   
   
                                Pre-op testing  2011      10/04/2011  
   
                                                      
  
  
Overview:Formatting of this note is different from the original.  
Images from the original note were not included.  
[facility]  
PRE-OP CHECKLIST  
  
Surgeon: Deangelo Angulo M.D. Informed Consent Completed: No  
STS Score: 1.4%  
CAD: Yes - CAD on Problem List: Yes  
Is intended procedure a CABG: Yes - is a beta blocker ordered? Yes  
  
H & P completed: Yes  
  
PA/LAT: Completed CT: Completed MRI: N/A LE US: N/A  
  
Cath: Yes - reviewed: Yes Echo:Completed EKG: Completed EF %: 61  
  
PI's: N/A Carotid: Completed Mapping: N/A Dental: Completed PFT's: N/A  
  
Basename 11 0729  
WBC 7.18  
HB 13.1  
HCT 41.8  
  
INR 1.0  
CREAT 1.35  
  
UA: Normal  
HCG:N/A  
  
ABO/ABO Confirmed: Yes  
  
Blood ordered: No  
  
SA Swab: Yes - results: Pending  
  
Last Dose of Anticoagulation: vitamins  
  
Op Note: N/A  
  
Pacemaker Check: N/A  
  
Consults: GI 2001  
  
DM: No  
  
Cardiac Surgical prep: No  
  
SIGNATURE: Krystal Castellanos RN CHECKED BY:  
DATE of SERVICE: 2011  
TIME of SERVICE: 2:23 PM  
  
  
   
   
                                Anemia of chronic disease 2011  
   
                                Intestinal polyp 2011  
   
                                                      
  
  
Overview:Formatting of this note might be different from the original.  
Distal ileum ulcerated polyp.  
   
   
                                Nonspecific abnormal results of liver function s  
tudy 2006  
   
                                Impotence of organic origin 2006  
   
                                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  
                 2021  
  
documented as of this encounter (statuses as of 2023)  
[facility]06- History of Past illness Narrative*   
  
                                Problem         Noted Date      Resolved Date  
   
                                Medicare annual wellness visit, subsequent 2019  
   
                                Encounter for long-term (current) use of medicat  
ions 2018  
   
                                                      
  
  
Overview:Formatting of this note might be different from the original.  
Added automatically from request for surgery [id number]  
   
   
                                History of colonic polyps 2018  
   
                                                      
  
  
Overview:Formatting of this note might be different from the original.  
Added automatically from request for surgery [id number]  
   
   
                                Gastroesophageal reflux disease 2018  
   
                                                      
  
  
Overview:Formatting of this note might be different from the original.  
Added automatically from request for surgery [id number]  
   
   
                                Acute pulmonary edema 10/05/2011      10/06/2011  
   
                                                      
  
  
Overview:Formatting of this note might be different from the original.  
10/5/2011  
cardiogenic and noncardiogenic factors  
  
   
   
                                Atelectasis     10/05/2011      10/06/2011  
   
                                Hypotension     10/04/2011      10/06/2011  
   
                                                      
  
  
Overview:Formatting of this note might be different from the original.  
10/4/2011  
goal map 65-80  
   
   
                                Stress hyperglycemia 10/04/2011      10/06/2011  
   
                                                      
  
  
Overview:Formatting of this note might be different from the original.  
10/4/2011  
Perioperative insulin resistance and exacerbation of hyperglycemia.  
Control blood glucose with titration of insulin infusion  
  
10/5/2011  
adequate control  
goal FBG<180  
  
   
   
                                Post-op pain    10/04/2011      10/06/2011  
   
                                Mechanically assisted ventilation 10/04/2011      
  10/05/2011  
   
                                                      
  
  
Overview:Formatting of this note might be different from the original.  
10/4/2011  
optimize MV settings, WTE  
current setting:FiO2, 75%, peep 8  
   
   
                                Cardiac insufficiency 10/04/2011      10/05/2011  
   
                                                      
  
  
Overview:Formatting of this note might be different from the original.  
10/4/2011  
RV mild to moderate post pump  
goal CI>2.3  
   
   
                                Hypoxemia       10/04/2011      10/06/2011  
   
                                discharge planning 2011      10/19/2011  
   
                                                      
  
  
Overview:Formatting of this note might be different from the original.  
age 71,  lives in [place], [place]. No DC needs.  
/  
   
   
                                Pre-op testing  2011      10/04/2011  
   
                                                      
  
  
Overview:Formatting of this note is different from the original.  
Images from the original note were not included.  
[facility]  
PRE-OP CHECKLIST  
  
Surgeon: Deangelo Angulo M.D. Informed Consent Completed: No  
STS Score: 1.4%  
CAD: Yes - CAD on Problem List: Yes  
Is intended procedure a CABG: Yes - is a beta blocker ordered? Yes  
  
H & P completed: Yes  
  
PA/LAT: Completed CT: Completed MRI: N/A LE US: N/A  
  
Cath: Yes - reviewed: Yes Echo:Completed EKG: Completed EF %: 61  
  
PI's: N/A Carotid: Completed Mapping: N/A Dental: Completed PFT's: N/A  
  
Basename 11 0729  
WBC 7.18  
HB 13.1  
HCT 41.8  
  
INR 1.0  
CREAT 1.35  
  
UA: Normal  
HCG:N/A  
  
ABO/ABO Confirmed: Yes  
  
Blood ordered: No  
  
SA Swab: Yes - results: Pending  
  
Last Dose of Anticoagulation: vitamins  
  
Op Note: N/A  
  
Pacemaker Check: N/A  
  
Consults: GI 2001  
  
DM: No  
  
Cardiac Surgical prep: No  
  
SIGNATURE: Krystal Castellanos RN CHECKED BY:  
DATE of SERVICE: 2011  
TIME of SERVICE: 2:23 PM  
  
  
   
   
                                Anemia of chronic disease 2011  
   
                                Intestinal polyp 2011  
   
                                                      
  
  
Overview:Formatting of this note might be different from the original.  
Distal ileum ulcerated polyp.  
   
   
                                Nonspecific abnormal results of liver function s  
pepe 2006  
   
                                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  
                 2021  
  
documented as of this encounter (statuses as of 2023)  
[facility]06- History of Past illness Narrative*   
  
                                Problem         Noted Date      Resolved Date  
   
                                Medicare annual wellness visit, subsequent 2019  
   
                                Encounter for long-term (current) use of medicat  
ions 2018  
   
                                                      
  
  
Overview:Formatting of this note might be different from the original.  
Added automatically from request for surgery [id number]  
   
   
                                History of colonic polyps 2018  
   
                                                      
  
  
Overview:Formatting of this note might be different from the original.  
Added automatically from request for surgery [id number]  
   
   
                                Gastroesophageal reflux disease 2018  
   
                                                      
  
  
Overview:Formatting of this note might be different from the original.  
Added automatically from request for surgery [id number]  
   
   
                                Acute pulmonary edema 10/05/2011      10/06/2011  
   
                                                      
  
  
Overview:Formatting of this note might be different from the original.  
10/5/2011  
cardiogenic and noncardiogenic factors  
  
   
   
                                Atelectasis     10/05/2011      10/06/2011  
   
                                Hypotension     10/04/2011      10/06/2011  
   
                                                      
  
  
Overview:Formatting of this note might be different from the original.  
10/4/2011  
goal map 65-80  
   
   
                                Stress hyperglycemia 10/04/2011      10/06/2011  
   
                                                      
  
  
Overview:Formatting of this note might be different from the original.  
10/4/2011  
Perioperative insulin resistance and exacerbation of hyperglycemia.  
Control blood glucose with titration of insulin infusion  
  
10/5/2011  
adequate control  
goal FBG<180  
  
   
   
                                Post-op pain    10/04/2011      10/06/2011  
   
                                Mechanically assisted ventilation 10/04/2011      
  10/05/2011  
   
                                                      
  
  
Overview:Formatting of this note might be different from the original.  
10/4/2011  
optimize MV settings, WTE  
current setting:FiO2, 75%, peep 8  
   
   
                                Cardiac insufficiency 10/04/2011      10/05/2011  
   
                                                      
  
  
Overview:Formatting of this note might be different from the original.  
10/4/2011  
RV mild to moderate post pump  
goal CI>2.3  
   
   
                                Hypoxemia       10/04/2011      10/06/2011  
   
                                discharge planning 2011      10/19/2011  
   
                                                      
  
  
Overview:Formatting of this note might be different from the original.  
age 71,  lives in [place], [place]. No DC needs.  
/  
   
   
                                Pre-op testing  2011      10/04/2011  
   
                                                      
  
  
Overview:Formatting of this note is different from the original.  
Images from the original note were not included.  
[facility]  
PRE-OP CHECKLIST  
  
Surgeon: Deangelo Angulo M.D. Informed Consent Completed: No  
STS Score: 1.4%  
CAD: Yes - CAD on Problem List: Yes  
Is intended procedure a CABG: Yes - is a beta blocker ordered? Yes  
  
H & P completed: Yes  
  
PA/LAT: Completed CT: Completed MRI: N/A LE US: N/A  
  
Cath: Yes - reviewed: Yes Echo:Completed EKG: Completed EF %: 61  
  
PI's: N/A Carotid: Completed Mapping: N/A Dental: Completed PFT's: N/A  
  
Basename 11 0729  
WBC 7.18  
HB 13.1  
HCT 41.8  
  
INR 1.0  
CREAT 1.35  
  
UA: Normal  
HCG:N/A  
  
ABO/ABO Confirmed: Yes  
  
Blood ordered: No  
  
SA Swab: Yes - results: Pending  
  
Last Dose of Anticoagulation: vitamins  
  
Op Note: N/A  
  
Pacemaker Check: N/A  
  
Consults: GI 2001  
  
DM: No  
  
Cardiac Surgical prep: No  
  
SIGNATURE: Krystal Castellanos RN CHECKED BY:  
DATE of SERVICE: 2011  
TIME of SERVICE: 2:23 PM  
  
  
   
   
                                Anemia of chronic disease 2011  
   
                                Intestinal polyp 2011  
   
                                                      
  
  
Overview:Formatting of this note might be different from the original.  
Distal ileum ulcerated polyp.  
   
   
                                Nonspecific abnormal results of liver function s  
pepe 2006  
   
                                Impotence of organic origin 2006  
   
                                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  
                 2021  
  
documented as of this encounter (statuses as of 2023)  
[facility]- History of Past illness Narrative*   
  
                                Problem         Noted Date      Resolved Date  
   
                                Medicare annual wellness visit, subsequent 2019  
   
                                Encounter for long-term (current) use of medicat  
ions 2018  
   
                                                      
  
  
Overview:Formatting of this note might be different from the original.  
Added automatically from request for surgery [id number]  
   
   
                                History of colonic polyps 2018  
   
                                                      
  
  
Overview:Formatting of this note might be different from the original.  
Added automatically from request for surgery [id number]  
   
   
                                Gastroesophageal reflux disease 2018  
   
                                                      
  
  
Overview:Formatting of this note might be different from the original.  
Added automatically from request for surgery [id number]  
   
   
                                Acute pulmonary edema 10/05/2011      10/06/2011  
   
                                                      
  
  
Overview:Formatting of this note might be different from the original.  
10/5/2011  
cardiogenic and noncardiogenic factors  
  
   
   
                                Atelectasis     10/05/2011      10/06/2011  
   
                                Hypotension     10/04/2011      10/06/2011  
   
                                                      
  
  
Overview:Formatting of this note might be different from the original.  
10/4/2011  
goal map 65-80  
   
   
                                Stress hyperglycemia 10/04/2011      10/06/2011  
   
                                                      
  
  
Overview:Formatting of this note might be different from the original.  
10/4/2011  
Perioperative insulin resistance and exacerbation of hyperglycemia.  
Control blood glucose with titration of insulin infusion  
  
10/5/2011  
adequate control  
goal FBG<180  
  
   
   
                                Post-op pain    10/04/2011      10/06/2011  
   
                                Mechanically assisted ventilation 10/04/2011      
  10/05/2011  
   
                                                      
  
  
Overview:Formatting of this note might be different from the original.  
10/4/2011  
optimize MV settings, WTE  
current setting:FiO2, 75%, peep 8  
   
   
                                Cardiac insufficiency 10/04/2011      10/05/2011  
   
                                                      
  
  
Overview:Formatting of this note might be different from the original.  
10/4/2011  
RV mild to moderate post pump  
goal CI>2.3  
   
   
                                Hypoxemia       10/04/2011      10/06/2011  
   
                                discharge planning 2011      10/19/2011  
   
                                                      
  
  
Overview:Formatting of this note might be different from the original.  
age 71,  lives in [place], [place]. No DC needs.  
/  
   
   
                                Pre-op testing  2011      10/04/2011  
   
                                                      
  
  
Overview:Formatting of this note is different from the original.  
Images from the original note were not included.  
[facility]  
PRE-OP CHECKLIST  
  
Surgeon: Deangelo Angulo M.D. Informed Consent Completed: No  
STS Score: 1.4%  
CAD: Yes - CAD on Problem List: Yes  
Is intended procedure a CABG: Yes - is a beta blocker ordered? Yes  
  
H & P completed: Yes  
  
PA/LAT: Completed CT: Completed MRI: N/A LE US: N/A  
  
Cath: Yes - reviewed: Yes Echo:Completed EKG: Completed EF %: 61  
  
PI's: N/A Carotid: Completed Mapping: N/A Dental: Completed PFT's: N/A  
  
Basename 11 0729  
WBC 7.18  
HB 13.1  
HCT 41.8  
  
INR 1.0  
CREAT 1.35  
  
UA: Normal  
HCG:N/A  
  
ABO/ABO Confirmed: Yes  
  
Blood ordered: No  
  
SA Swab: Yes - results: Pending  
  
Last Dose of Anticoagulation: vitamins  
  
Op Note: N/A  
  
Pacemaker Check: N/A  
  
Consults: GI 2001  
  
DM: No  
  
Cardiac Surgical prep: No  
  
SIGNATURE: Krystal Castellanos RN CHECKED BY:  
DATE of SERVICE: 2011  
TIME of SERVICE: 2:23 PM  
  
  
   
   
                                Anemia of chronic disease 2011  
   
                                Intestinal polyp 2011  
   
                                                      
  
  
Overview:Formatting of this note might be different from the original.  
Distal ileum ulcerated polyp.  
   
   
                                Nonspecific abnormal results of liver function s  
tudy 2006  
   
                                Impotence of organic origin 2006  
   
                                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  
                 2021  
  
documented as of this encounter (statuses as of 2023)  
[facility]- History of Past illness Narrative*   
  
                                Problem         Noted Date      Resolved Date  
   
                                Medicare annual wellness visit, subsequent 2019  
   
                                Encounter for long-term (current) use of medicat  
ions 2018  
   
                                                      
  
  
Overview:Formatting of this note might be different from the original.  
Added automatically from request for surgery [id number]  
   
   
                                History of colonic polyps 2018  
   
                                                      
  
  
Overview:Formatting of this note might be different from the original.  
Added automatically from request for surgery [id number]  
   
   
                                Gastroesophageal reflux disease 2018  
   
                                                      
  
  
Overview:Formatting of this note might be different from the original.  
Added automatically from request for surgery [id number]  
   
   
                                Acute pulmonary edema 10/05/2011      10/06/2011  
   
                                                      
  
  
Overview:Formatting of this note might be different from the original.  
10/5/2011  
cardiogenic and noncardiogenic factors  
  
   
   
                                Atelectasis     10/05/2011      10/06/2011  
   
                                Hypotension     10/04/2011      10/06/2011  
   
                                                      
  
  
Overview:Formatting of this note might be different from the original.  
10/4/2011  
goal map 65-80  
   
   
                                Stress hyperglycemia 10/04/2011      10/06/2011  
   
                                                      
  
  
Overview:Formatting of this note might be different from the original.  
10/4/2011  
Perioperative insulin resistance and exacerbation of hyperglycemia.  
Control blood glucose with titration of insulin infusion  
  
10/5/2011  
adequate control  
goal FBG<180  
  
   
   
                                Post-op pain    10/04/2011      10/06/2011  
   
                                Mechanically assisted ventilation 10/04/2011      
  10/05/2011  
   
                                                      
  
  
Overview:Formatting of this note might be different from the original.  
10/4/2011  
optimize MV settings, WTE  
current setting:FiO2, 75%, peep 8  
   
   
                                Cardiac insufficiency 10/04/2011      10/05/2011  
   
                                                      
  
  
Overview:Formatting of this note might be different from the original.  
10/4/2011  
RV mild to moderate post pump  
goal CI>2.3  
   
   
                                Hypoxemia       10/04/2011      10/06/2011  
   
                                discharge planning 2011      10/19/2011  
   
                                                      
  
  
Overview:Formatting of this note might be different from the original.  
age 71,  lives in [place], [place]. No DC needs.  
/  
   
   
                                Pre-op testing  2011      10/04/2011  
   
                                                      
  
  
Overview:Formatting of this note is different from the original.  
Images from the original note were not included.  
[facility]  
PRE-OP CHECKLIST  
  
Surgeon: Deangelo Angulo M.D. Informed Consent Completed: No  
STS Score: 1.4%  
CAD: Yes - CAD on Problem List: Yes  
Is intended procedure a CABG: Yes - is a beta blocker ordered? Yes  
  
H & P completed: Yes  
  
PA/LAT: Completed CT: Completed MRI: N/A LE US: N/A  
  
Cath: Yes - reviewed: Yes Echo:Completed EKG: Completed EF %: 61  
  
PI's: N/A Carotid: Completed Mapping: N/A Dental: Completed PFT's: N/A  
  
Basename 11 0729  
WBC 7.18  
HB 13.1  
HCT 41.8  
  
INR 1.0  
CREAT 1.35  
  
UA: Normal  
HCG:N/A  
  
ABO/ABO Confirmed: Yes  
  
Blood ordered: No  
  
SA Swab: Yes - results: Pending  
  
Last Dose of Anticoagulation: vitamins  
  
Op Note: N/A  
  
Pacemaker Check: N/A  
  
Consults: GI 2001  
  
DM: No  
  
Cardiac Surgical prep: No  
  
SIGNATURE: Krystal Castellanos RN CHECKED BY:  
DATE of SERVICE: 2011  
TIME of SERVICE: 2:23 PM  
  
  
   
   
                                Anemia of chronic disease 2011  
   
                                Intestinal polyp 2011  
   
                                                      
  
  
Overview:Formatting of this note might be different from the original.  
Distal ileum ulcerated polyp.  
   
   
                                Nonspecific abnormal results of liver function s  
tudy 2006  
   
                                Impotence of organic origin 2006  
   
                                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  
                 2021  
  
documented as of this encounter (statuses as of 2023)  
[facility]06- History of Past illness Narrative*   
  
                                Problem         Noted Date      Resolved Date  
   
                                Medicare annual wellness visit, subsequent 2019  
   
                                Encounter for long-term (current) use of medicat  
ions 2018  
   
                                                      
  
  
Overview:Formatting of this note might be different from the original.  
Added automatically from request for surgery [id number]  
   
   
                                History of colonic polyps 2018  
   
                                                      
  
  
Overview:Formatting of this note might be different from the original.  
Added automatically from request for surgery [id number]  
   
   
                                Gastroesophageal reflux disease 2018  
   
                                                      
  
  
Overview:Formatting of this note might be different from the original.  
Added automatically from request for surgery [id number]  
   
   
                                Acute pulmonary edema 10/05/2011      10/06/2011  
   
                                                      
  
  
Overview:Formatting of this note might be different from the original.  
10/5/2011  
cardiogenic and noncardiogenic factors  
  
   
   
                                Atelectasis     10/05/2011      10/06/2011  
   
                                Hypotension     10/04/2011      10/06/2011  
   
                                                      
  
  
Overview:Formatting of this note might be different from the original.  
10/4/2011  
goal map 65-80  
   
   
                                Stress hyperglycemia 10/04/2011      10/06/2011  
   
                                                      
  
  
Overview:Formatting of this note might be different from the original.  
10/4/2011  
Perioperative insulin resistance and exacerbation of hyperglycemia.  
Control blood glucose with titration of insulin infusion  
  
10/5/2011  
adequate control  
goal FBG<180  
  
   
   
                                Post-op pain    10/04/2011      10/06/2011  
   
                                Mechanically assisted ventilation 10/04/2011      
  10/05/2011  
   
                                                      
  
  
Overview:Formatting of this note might be different from the original.  
10/4/2011  
optimize MV settings, WTE  
current setting:FiO2, 75%, peep 8  
   
   
                                Cardiac insufficiency 10/04/2011      10/05/2011  
   
                                                      
  
  
Overview:Formatting of this note might be different from the original.  
10/4/2011  
RV mild to moderate post pump  
goal CI>2.3  
   
   
                                Hypoxemia       10/04/2011      10/06/2011  
   
                                discharge planning 2011      10/19/2011  
   
                                                      
  
  
Overview:Formatting of this note might be different from the original.  
age 71,  lives in [place], [place]. No DC needs.  
/  
   
   
                                Pre-op testing  2011      10/04/2011  
   
                                                      
  
  
Overview:Formatting of this note is different from the original.  
Images from the original note were not included.  
[facility]  
PRE-OP CHECKLIST  
  
Surgeon: Deangelo Anuglo M.D. Informed Consent Completed: No  
STS Score: 1.4%  
CAD: Yes - CAD on Problem List: Yes  
Is intended procedure a CABG: Yes - is a beta blocker ordered? Yes  
  
H & P completed: Yes  
  
PA/LAT: Completed CT: Completed MRI: N/A LE US: N/A  
  
Cath: Yes - reviewed: Yes Echo:Completed EKG: Completed EF %: 61  
  
PI's: N/A Carotid: Completed Mapping: N/A Dental: Completed PFT's: N/A  
  
Basename 11 0729  
WBC 7.18  
HB 13.1  
HCT 41.8  
  
INR 1.0  
CREAT 1.35  
  
UA: Normal  
HCG:N/A  
  
ABO/ABO Confirmed: Yes  
  
Blood ordered: No  
  
SA Swab: Yes - results: Pending  
  
Last Dose of Anticoagulation: vitamins  
  
Op Note: N/A  
  
Pacemaker Check: N/A  
  
Consults: GI 2001  
  
DM: No  
  
Cardiac Surgical prep: No  
  
SIGNATURE: Krystal Castellanos RN CHECKED BY:  
DATE of SERVICE: 2011  
TIME of SERVICE: 2:23 PM  
  
  
   
   
                                Anemia of chronic disease 2011  
   
                                Intestinal polyp 2011  
   
                                                      
  
  
Overview:Formatting of this note might be different from the original.  
Distal ileum ulcerated polyp.  
   
   
                                Nonspecific abnormal results of liver function s  
tudy 2006  
   
                                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  
                 2021  
  
documented as of this encounter (statuses as of 2023)  
[facility]06- History of Past illness Narrative*   
  
                                Problem         Noted Date      Resolved Date  
   
                                Medicare annual wellness visit, subsequent 2019  
   
                                Encounter for long-term (current) use of medicat  
ions 2018  
   
                                                      
  
  
Overview:Formatting of this note might be different from the original.  
Added automatically from request for surgery [id number]  
   
   
                                History of colonic polyps 2018  
   
                                                      
  
  
Overview:Formatting of this note might be different from the original.  
Added automatically from request for surgery [id number]  
   
   
                                Gastroesophageal reflux disease 2018  
   
                                                      
  
  
Overview:Formatting of this note might be different from the original.  
Added automatically from request for surgery [id number]  
   
   
                                Acute pulmonary edema 10/05/2011      10/06/2011  
   
                                                      
  
  
Overview:Formatting of this note might be different from the original.  
10/5/2011  
cardiogenic and noncardiogenic factors  
  
   
   
                                Atelectasis     10/05/2011      10/06/2011  
   
                                Hypotension     10/04/2011      10/06/2011  
   
                                                      
  
  
Overview:Formatting of this note might be different from the original.  
10/4/2011  
goal map 65-80  
   
   
                                Stress hyperglycemia 10/04/2011      10/06/2011  
   
                                                      
  
  
Overview:Formatting of this note might be different from the original.  
10/4/2011  
Perioperative insulin resistance and exacerbation of hyperglycemia.  
Control blood glucose with titration of insulin infusion  
  
10/5/2011  
adequate control  
goal FBG<180  
  
   
   
                                Post-op pain    10/04/2011      10/06/2011  
   
                                Mechanically assisted ventilation 10/04/2011      
  10/05/2011  
   
                                                      
  
  
Overview:Formatting of this note might be different from the original.  
10/4/2011  
optimize MV settings, WTE  
current setting:FiO2, 75%, peep 8  
   
   
                                Cardiac insufficiency 10/04/2011      10/05/2011  
   
                                                      
  
  
Overview:Formatting of this note might be different from the original.  
10/4/2011  
RV mild to moderate post pump  
goal CI>2.3  
   
   
                                Hypoxemia       10/04/2011      10/06/2011  
   
                                discharge planning 2011      10/19/2011  
   
                                                      
  
  
Overview:Formatting of this note might be different from the original.  
age 71,  lives in [place], [place]. No DC needs.  
/  
   
   
                                Pre-op testing  2011      10/04/2011  
   
                                                      
  
  
Overview:Formatting of this note is different from the original.  
Images from the original note were not included.  
[facility]  
PRE-OP CHECKLIST  
  
Surgeon: Deangelo Angulo M.D. Informed Consent Completed: No  
STS Score: 1.4%  
CAD: Yes - CAD on Problem List: Yes  
Is intended procedure a CABG: Yes - is a beta blocker ordered? Yes  
  
H & P completed: Yes  
  
PA/LAT: Completed CT: Completed MRI: N/A LE US: N/A  
  
Cath: Yes - reviewed: Yes Echo:Completed EKG: Completed EF %: 61  
  
PI's: N/A Carotid: Completed Mapping: N/A Dental: Completed PFT's: N/A  
  
Basename 11 0729  
WBC 7.18  
HB 13.1  
HCT 41.8  
  
INR 1.0  
CREAT 1.35  
  
UA: Normal  
HCG:N/A  
  
ABO/ABO Confirmed: Yes  
  
Blood ordered: No  
  
SA Swab: Yes - results: Pending  
  
Last Dose of Anticoagulation: vitamins  
  
Op Note: N/A  
  
Pacemaker Check: N/A  
  
Consults: GI 2001  
  
DM: No  
  
Cardiac Surgical prep: No  
  
SIGNATURE: Krystal Castellanos RN CHECKED BY:  
DATE of SERVICE: 2011  
TIME of SERVICE: 2:23 PM  
  
  
   
   
                                Anemia of chronic disease 2011  
   
                                Intestinal polyp 2011  
   
                                                      
  
  
Overview:Formatting of this note might be different from the original.  
Distal ileum ulcerated polyp.  
   
   
                                Nonspecific abnormal results of liver function s  
tudy 2006  
   
                                Impotence of organic origin 2006  
   
                                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  
                 2021  
  
documented as of this encounter (statuses as of 2023)  
[facility]06- History of Past illness Narrative*   
  
                                Problem         Noted Date      Resolved Date  
   
                                Medicare annual wellness visit, subsequent 2019  
   
                                Encounter for long-term (current) use of medicat  
ions 2018  
   
                                                      
  
  
Overview:Formatting of this note might be different from the original.  
Added automatically from request for surgery [id number]  
   
   
                                History of colonic polyps 2018  
   
                                                      
  
  
Overview:Formatting of this note might be different from the original.  
Added automatically from request for surgery [id number]  
   
   
                                Gastroesophageal reflux disease 2018  
   
                                                      
  
  
Overview:Formatting of this note might be different from the original.  
Added automatically from request for surgery [id number]  
   
   
                                Acute pulmonary edema 10/05/2011      10/06/2011  
   
                                                      
  
  
Overview:Formatting of this note might be different from the original.  
10/5/2011  
cardiogenic and noncardiogenic factors  
  
   
   
                                Atelectasis     10/05/2011      10/06/2011  
   
                                Hypotension     10/04/2011      10/06/2011  
   
                                                      
  
  
Overview:Formatting of this note might be different from the original.  
10/4/2011  
goal map 65-80  
   
   
                                Stress hyperglycemia 10/04/2011      10/06/2011  
   
                                                      
  
  
Overview:Formatting of this note might be different from the original.  
10/4/2011  
Perioperative insulin resistance and exacerbation of hyperglycemia.  
Control blood glucose with titration of insulin infusion  
  
10/5/2011  
adequate control  
goal FBG<180  
  
   
   
                                Post-op pain    10/04/2011      10/06/2011  
   
                                Mechanically assisted ventilation 10/04/2011      
  10/05/2011  
   
                                                      
  
  
Overview:Formatting of this note might be different from the original.  
10/4/2011  
optimize MV settings, WTE  
current setting:FiO2, 75%, peep 8  
   
   
                                Cardiac insufficiency 10/04/2011      10/05/2011  
   
                                                      
  
  
Overview:Formatting of this note might be different from the original.  
10/4/2011  
RV mild to moderate post pump  
goal CI>2.3  
   
   
                                Hypoxemia       10/04/2011      10/06/2011  
   
                                discharge planning 2011      10/19/2011  
   
                                                      
  
  
Overview:Formatting of this note might be different from the original.  
age 71,  lives in [place], [place]. No DC needs.  
/  
   
   
                                Pre-op testing  2011      10/04/2011  
   
                                                      
  
  
Overview:Formatting of this note is different from the original.  
Images from the original note were not included.  
[facility]  
PRE-OP CHECKLIST  
  
Surgeon: Deangelo Angulo M.D. Informed Consent Completed: No  
STS Score: 1.4%  
CAD: Yes - CAD on Problem List: Yes  
Is intended procedure a CABG: Yes - is a beta blocker ordered? Yes  
  
H & P completed: Yes  
  
PA/LAT: Completed CT: Completed MRI: N/A LE US: N/A  
  
Cath: Yes - reviewed: Yes Echo:Completed EKG: Completed EF %: 61  
  
PI's: N/A Carotid: Completed Mapping: N/A Dental: Completed PFT's: N/A  
  
Basename 11 0729  
WBC 7.18  
HB 13.1  
HCT 41.8  
  
INR 1.0  
CREAT 1.35  
  
UA: Normal  
HCG:N/A  
  
ABO/ABO Confirmed: Yes  
  
Blood ordered: No  
  
SA Swab: Yes - results: Pending  
  
Last Dose of Anticoagulation: vitamins  
  
Op Note: N/A  
  
Pacemaker Check: N/A  
  
Consults: GI 2001  
  
DM: No  
  
Cardiac Surgical prep: No  
  
SIGNATURE: Krystal Castellanos RN CHECKED BY:  
DATE of SERVICE: 2011  
TIME of SERVICE: 2:23 PM  
  
  
   
   
                                Anemia of chronic disease 2011  
   
                                Intestinal polyp 2011  
   
                                                      
  
  
Overview:Formatting of this note might be different from the original.  
Distal ileum ulcerated polyp.  
   
   
                                Nonspecific abnormal results of liver function s  
tudy 2006  
   
                                Impotence of organic origin 2006  
   
                                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  
                 2021  
  
documented as of this encounter (statuses as of 2023)  
[facility]- History of Past illness Narrative*   
  
                                Problem         Noted Date      Resolved Date  
   
                                Medicare annual wellness visit, subsequent 2019  
   
                                Encounter for long-term (current) use of medicat  
ions 2018  
   
                                                      
  
  
Overview:Formatting of this note might be different from the original.  
Added automatically from request for surgery [id number]  
   
   
                                History of colonic polyps 2018  
   
                                                      
  
  
Overview:Formatting of this note might be different from the original.  
Added automatically from request for surgery [id number]  
   
   
                                Gastroesophageal reflux disease 2018  
   
                                                      
  
  
Overview:Formatting of this note might be different from the original.  
Added automatically from request for surgery [id number]  
   
   
                                Acute pulmonary edema 10/05/2011      10/06/2011  
   
                                                      
  
  
Overview:Formatting of this note might be different from the original.  
10/5/2011  
cardiogenic and noncardiogenic factors  
  
   
   
                                Atelectasis     10/05/2011      10/06/2011  
   
                                Hypotension     10/04/2011      10/06/2011  
   
                                                      
  
  
Overview:Formatting of this note might be different from the original.  
10/4/2011  
goal map 65-80  
   
   
                                Stress hyperglycemia 10/04/2011      10/06/2011  
   
                                                      
  
  
Overview:Formatting of this note might be different from the original.  
10/4/2011  
Perioperative insulin resistance and exacerbation of hyperglycemia.  
Control blood glucose with titration of insulin infusion  
  
10/5/2011  
adequate control  
goal FBG<180  
  
   
   
                                Post-op pain    10/04/2011      10/06/2011  
   
                                Mechanically assisted ventilation 10/04/2011      
  10/05/2011  
   
                                                      
  
  
Overview:Formatting of this note might be different from the original.  
10/4/2011  
optimize MV settings, WTE  
current setting:FiO2, 75%, peep 8  
   
   
                                Cardiac insufficiency 10/04/2011      10/05/2011  
   
                                                      
  
  
Overview:Formatting of this note might be different from the original.  
10/4/2011  
RV mild to moderate post pump  
goal CI>2.3  
   
   
                                Hypoxemia       10/04/2011      10/06/2011  
   
                                discharge planning 2011      10/19/2011  
   
                                                      
  
  
Overview:Formatting of this note might be different from the original.  
age 71,  lives in [place], [place]. No DC needs.  
/  
   
   
                                Pre-op testing  2011      10/04/2011  
   
                                                      
  
  
Overview:Formatting of this note is different from the original.  
Images from the original note were not included.  
[facility]  
PRE-OP CHECKLIST  
  
Surgeon: Deangelo Angulo M.D. Informed Consent Completed: No  
STS Score: 1.4%  
CAD: Yes - CAD on Problem List: Yes  
Is intended procedure a CABG: Yes - is a beta blocker ordered? Yes  
  
H & P completed: Yes  
  
PA/LAT: Completed CT: Completed MRI: N/A LE US: N/A  
  
Cath: Yes - reviewed: Yes Echo:Completed EKG: Completed EF %: 61  
  
PI's: N/A Carotid: Completed Mapping: N/A Dental: Completed PFT's: N/A  
  
Basename 11 0729  
WBC 7.18  
HB 13.1  
HCT 41.8  
  
INR 1.0  
CREAT 1.35  
  
UA: Normal  
HCG:N/A  
  
ABO/ABO Confirmed: Yes  
  
Blood ordered: No  
  
SA Swab: Yes - results: Pending  
  
Last Dose of Anticoagulation: vitamins  
  
Op Note: N/A  
  
Pacemaker Check: N/A  
  
Consults: GI 2001  
  
DM: No  
  
Cardiac Surgical prep: No  
  
SIGNATURE: Krystal Castellanos RN CHECKED BY:  
DATE of SERVICE: 2011  
TIME of SERVICE: 2:23 PM  
  
  
   
   
                                Anemia of chronic disease 2011  
   
                                Intestinal polyp 2011  
   
                                                      
  
  
Overview:Formatting of this note might be different from the original.  
Distal ileum ulcerated polyp.  
   
   
                                Nonspecific abnormal results of liver function s  
tudy 2006  
   
                                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  
                 2021  
  
documented as of this encounter (statuses as of 2023)  
[facility]- History of Past illness Narrative*   
  
                                Problem         Noted Date      Resolved Date  
   
                                Medicare annual wellness visit, subsequent 2019  
   
                                Encounter for long-term (current) use of medicat  
ions 2018  
   
                                                      
  
  
Overview:Formatting of this note might be different from the original.  
Added automatically from request for surgery [id number]  
   
   
                                History of colonic polyps 2018  
   
                                                      
  
  
Overview:Formatting of this note might be different from the original.  
Added automatically from request for surgery [id number]  
   
   
                                Gastroesophageal reflux disease 2018  
   
                                                      
  
  
Overview:Formatting of this note might be different from the original.  
Added automatically from request for surgery [id number]  
   
   
                                Acute pulmonary edema 10/05/2011      10/06/2011  
   
                                                      
  
  
Overview:Formatting of this note might be different from the original.  
10/5/2011  
cardiogenic and noncardiogenic factors  
  
   
   
                                Atelectasis     10/05/2011      10/06/2011  
   
                                Hypotension     10/04/2011      10/06/2011  
   
                                                      
  
  
Overview:Formatting of this note might be different from the original.  
10/4/2011  
goal map 65-80  
   
   
                                Stress hyperglycemia 10/04/2011      10/06/2011  
   
                                                      
  
  
Overview:Formatting of this note might be different from the original.  
10/4/2011  
Perioperative insulin resistance and exacerbation of hyperglycemia.  
Control blood glucose with titration of insulin infusion  
  
10/5/2011  
adequate control  
goal FBG<180  
  
   
   
                                Post-op pain    10/04/2011      10/06/2011  
   
                                Mechanically assisted ventilation 10/04/2011      
  10/05/2011  
   
                                                      
  
  
Overview:Formatting of this note might be different from the original.  
10/4/2011  
optimize MV settings, WTE  
current setting:FiO2, 75%, peep 8  
   
   
                                Cardiac insufficiency 10/04/2011      10/05/2011  
   
                                                      
  
  
Overview:Formatting of this note might be different from the original.  
10/4/2011  
RV mild to moderate post pump  
goal CI>2.3  
   
   
                                Hypoxemia       10/04/2011      10/06/2011  
   
                                discharge planning 2011      10/19/2011  
   
                                                      
  
  
Overview:Formatting of this note might be different from the original.  
age 71,  lives in [place], [place]. No DC needs.  
/  
   
   
                                Pre-op testing  2011      10/04/2011  
   
                                                      
  
  
Overview:Formatting of this note is different from the original.  
Images from the original note were not included.  
[facility]  
PRE-OP CHECKLIST  
  
Surgeon: Deangelo Angulo M.D. Informed Consent Completed: No  
STS Score: 1.4%  
CAD: Yes - CAD on Problem List: Yes  
Is intended procedure a CABG: Yes - is a beta blocker ordered? Yes  
  
H & P completed: Yes  
  
PA/LAT: Completed CT: Completed MRI: N/A LE US: N/A  
  
Cath: Yes - reviewed: Yes Echo:Completed EKG: Completed EF %: 61  
  
PI's: N/A Carotid: Completed Mapping: N/A Dental: Completed PFT's: N/A  
  
Basename 11 0729  
WBC 7.18  
HB 13.1  
HCT 41.8  
  
INR 1.0  
CREAT 1.35  
  
UA: Normal  
HCG:N/A  
  
ABO/ABO Confirmed: Yes  
  
Blood ordered: No  
  
SA Swab: Yes - results: Pending  
  
Last Dose of Anticoagulation: vitamins  
  
Op Note: N/A  
  
Pacemaker Check: N/A  
  
Consults: GI 2001  
  
DM: No  
  
Cardiac Surgical prep: No  
  
SIGNATURE: Krystal Castellanos RN CHECKED BY:  
DATE of SERVICE: 2011  
TIME of SERVICE: 2:23 PM  
  
  
   
   
                                Anemia of chronic disease 2011  
   
                                Intestinal polyp 2011  
   
                                                      
  
  
Overview:Formatting of this note might be different from the original.  
Distal ileum ulcerated polyp.  
   
   
                                Nonspecific abnormal results of liver function s  
tudy 2006  
   
                                Impotence of organic origin 2006      09/0  
2015  
   
                                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  
                 2021  
  
documented as of this encounter (statuses as of 2023)  
[facility]06- History of Past illness Narrative*   
  
                                Problem         Noted Date      Resolved Date  
   
                                Medicare annual wellness visit, subsequent 2019  
   
                                Encounter for long-term (current) use of medicat  
ions 2018  
   
                                                      
  
  
Overview:Formatting of this note might be different from the original.  
Added automatically from request for surgery [id number]  
   
   
                                History of colonic polyps 2018  
   
                                                      
  
  
Overview:Formatting of this note might be different from the original.  
Added automatically from request for surgery [id number]  
   
   
                                Gastroesophageal reflux disease 2018  
   
                                                      
  
  
Overview:Formatting of this note might be different from the original.  
Added automatically from request for surgery [id number]  
   
   
                                Acute pulmonary edema 10/05/2011      10/06/2011  
   
                                                      
  
  
Overview:Formatting of this note might be different from the original.  
10/5/2011  
cardiogenic and noncardiogenic factors  
  
   
   
                                Atelectasis     10/05/2011      10/06/2011  
   
                                Hypotension     10/04/2011      10/06/2011  
   
                                                      
  
  
Overview:Formatting of this note might be different from the original.  
10/4/2011  
goal map 65-80  
   
   
                                Stress hyperglycemia 10/04/2011      10/06/2011  
   
                                                      
  
  
Overview:Formatting of this note might be different from the original.  
10/4/2011  
Perioperative insulin resistance and exacerbation of hyperglycemia.  
Control blood glucose with titration of insulin infusion  
  
10/5/2011  
adequate control  
goal FBG<180  
  
   
   
                                Post-op pain    10/04/2011      10/06/2011  
   
                                Mechanically assisted ventilation 10/04/2011      
  10/05/2011  
   
                                                      
  
  
Overview:Formatting of this note might be different from the original.  
10/4/2011  
optimize MV settings, WTE  
current setting:FiO2, 75%, peep 8  
   
   
                                Cardiac insufficiency 10/04/2011      10/05/2011  
   
                                                      
  
  
Overview:Formatting of this note might be different from the original.  
10/4/2011  
RV mild to moderate post pump  
goal CI>2.3  
   
   
                                Hypoxemia       10/04/2011      10/06/2011  
   
                                discharge planning 2011      10/19/2011  
   
                                                      
  
  
Overview:Formatting of this note might be different from the original.  
age 71,  lives in [place], [place]. No DC needs.  
/  
   
   
                                Pre-op testing  2011      10/04/2011  
   
                                                      
  
  
Overview:Formatting of this note is different from the original.  
Images from the original note were not included.  
[facility]  
PRE-OP CHECKLIST  
  
Surgeon: Deangelo Angulo M.D. Informed Consent Completed: No  
STS Score: 1.4%  
CAD: Yes - CAD on Problem List: Yes  
Is intended procedure a CABG: Yes - is a beta blocker ordered? Yes  
  
H & P completed: Yes  
  
PA/LAT: Completed CT: Completed MRI: N/A LE US: N/A  
  
Cath: Yes - reviewed: Yes Echo:Completed EKG: Completed EF %: 61  
  
PI's: N/A Carotid: Completed Mapping: N/A Dental: Completed PFT's: N/A  
  
Basename 11 0729  
WBC 7.18  
HB 13.1  
HCT 41.8  
  
INR 1.0  
CREAT 1.35  
  
UA: Normal  
HCG:N/A  
  
ABO/ABO Confirmed: Yes  
  
Blood ordered: No  
  
SA Swab: Yes - results: Pending  
  
Last Dose of Anticoagulation: vitamins  
  
Op Note: N/A  
  
Pacemaker Check: N/A  
  
Consults: GI 2001  
  
DM: No  
  
Cardiac Surgical prep: No  
  
SIGNATURE: Krystal Castellanos RN CHECKED BY:  
DATE of SERVICE: 2011  
TIME of SERVICE: 2:23 PM  
  
  
   
   
                                Anemia of chronic disease 2011  
   
                                Intestinal polyp 2011  
   
                                                      
  
  
Overview:Formatting of this note might be different from the original.  
Distal ileum ulcerated polyp.  
   
   
                                Nonspecific abnormal results of liver function s  
nialldy 2006  
   
                                Impotence of organic origin 2006  
   
                                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  
                 2021  
  
documented as of this encounter (statuses as of 2023)  
[facility]06- History of Past illness Narrative*   
  
                                Problem         Noted Date      Resolved Date  
   
                                Medicare annual wellness visit, subsequent 2019  
   
                                Encounter for long-term (current) use of medicat  
ions 2018  
   
                                                      
  
  
Overview:Formatting of this note might be different from the original.  
Added automatically from request for surgery [id number]  
   
   
                                History of colonic polyps 2018  
   
                                                      
  
  
Overview:Formatting of this note might be different from the original.  
Added automatically from request for surgery [id number]  
   
   
                                Gastroesophageal reflux disease 2018  
   
                                                      
  
  
Overview:Formatting of this note might be different from the original.  
Added automatically from request for surgery [id number]  
   
   
                                Acute pulmonary edema 10/05/2011      10/06/2011  
   
                                                      
  
  
Overview:Formatting of this note might be different from the original.  
10/5/2011  
cardiogenic and noncardiogenic factors  
  
   
   
                                Atelectasis     10/05/2011      10/06/2011  
   
                                Hypotension     10/04/2011      10/06/2011  
   
                                                      
  
  
Overview:Formatting of this note might be different from the original.  
10/4/2011  
goal map 65-80  
   
   
                                Stress hyperglycemia 10/04/2011      10/06/2011  
   
                                                      
  
  
Overview:Formatting of this note might be different from the original.  
10/4/2011  
Perioperative insulin resistance and exacerbation of hyperglycemia.  
Control blood glucose with titration of insulin infusion  
  
10/5/2011  
adequate control  
goal FBG<180  
  
   
   
                                Post-op pain    10/04/2011      10/06/2011  
   
                                Mechanically assisted ventilation 10/04/2011      
  10/05/2011  
   
                                                      
  
  
Overview:Formatting of this note might be different from the original.  
10/4/2011  
optimize MV settings, WTE  
current setting:FiO2, 75%, peep 8  
   
   
                                Cardiac insufficiency 10/04/2011      10/05/2011  
   
                                                      
  
  
Overview:Formatting of this note might be different from the original.  
10/4/2011  
RV mild to moderate post pump  
goal CI>2.3  
   
   
                                Hypoxemia       10/04/2011      10/06/2011  
   
                                discharge planning 2011      10/19/2011  
   
                                                      
  
  
Overview:Formatting of this note might be different from the original.  
age 71,  lives in [place], [place]. No DC needs.  
/  
   
   
                                Pre-op testing  2011      10/04/2011  
   
                                                      
  
  
Overview:Formatting of this note is different from the original.  
Images from the original note were not included.  
[facility]  
PRE-OP CHECKLIST  
  
Surgeon: Deangelo Angulo M.D. Informed Consent Completed: No  
STS Score: 1.4%  
CAD: Yes - CAD on Problem List: Yes  
Is intended procedure a CABG: Yes - is a beta blocker ordered? Yes  
  
H & P completed: Yes  
  
PA/LAT: Completed CT: Completed MRI: N/A LE US: N/A  
  
Cath: Yes - reviewed: Yes Echo:Completed EKG: Completed EF %: 61  
  
PI's: N/A Carotid: Completed Mapping: N/A Dental: Completed PFT's: N/A  
  
Basename 11 0729  
WBC 7.18  
HB 13.1  
HCT 41.8  
  
INR 1.0  
CREAT 1.35  
  
UA: Normal  
HCG:N/A  
  
ABO/ABO Confirmed: Yes  
  
Blood ordered: No  
  
SA Swab: Yes - results: Pending  
  
Last Dose of Anticoagulation: vitamins  
  
Op Note: N/A  
  
Pacemaker Check: N/A  
  
Consults: GI 2001  
  
DM: No  
  
Cardiac Surgical prep: No  
  
SIGNATURE: Krystal Castellanos RN CHECKED BY:  
DATE of SERVICE: 2011  
TIME of SERVICE: 2:23 PM  
  
  
   
   
                                Anemia of chronic disease 2011  
   
                                Intestinal polyp 2011  
   
                                                      
  
  
Overview:Formatting of this note might be different from the original.  
Distal ileum ulcerated polyp.  
   
   
                                Nonspecific abnormal results of liver function s  
tudy 2006  
   
                                Impotence of organic origin 2006  
   
                                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  
                 2021  
  
documented as of this encounter (statuses as of 2023)  
[facility]06- History of Past illness Narrative*   
  
                                Problem         Noted Date      Resolved Date  
   
                                Medicare annual wellness visit, subsequent 2019  
   
                                Encounter for long-term (current) use of medicat  
ions 2018  
   
                                                      
  
  
Overview:Formatting of this note might be different from the original.  
Added automatically from request for surgery [id number]  
   
   
                                History of colonic polyps 2018  
   
                                                      
  
  
Overview:Formatting of this note might be different from the original.  
Added automatically from request for surgery [id number]  
   
   
                                Gastroesophageal reflux disease 2018  
   
                                                      
  
  
Overview:Formatting of this note might be different from the original.  
Added automatically from request for surgery [id number]  
   
   
                                Acute pulmonary edema 10/05/2011      10/06/2011  
   
                                                      
  
  
Overview:Formatting of this note might be different from the original.  
10/5/2011  
cardiogenic and noncardiogenic factors  
  
   
   
                                Atelectasis     10/05/2011      10/06/2011  
   
                                Hypotension     10/04/2011      10/06/2011  
   
                                                      
  
  
Overview:Formatting of this note might be different from the original.  
10/4/2011  
goal map 65-80  
   
   
                                Stress hyperglycemia 10/04/2011      10/06/2011  
   
                                                      
  
  
Overview:Formatting of this note might be different from the original.  
10/4/2011  
Perioperative insulin resistance and exacerbation of hyperglycemia.  
Control blood glucose with titration of insulin infusion  
  
10/5/2011  
adequate control  
goal FBG<180  
  
   
   
                                Post-op pain    10/04/2011      10/06/2011  
   
                                Mechanically assisted ventilation 10/04/2011      
  10/05/2011  
   
                                                      
  
  
Overview:Formatting of this note might be different from the original.  
10/4/2011  
optimize MV settings, WTE  
current setting:FiO2, 75%, peep 8  
   
   
                                Cardiac insufficiency 10/04/2011      10/05/2011  
   
                                                      
  
  
Overview:Formatting of this note might be different from the original.  
10/4/2011  
RV mild to moderate post pump  
goal CI>2.3  
   
   
                                Hypoxemia       10/04/2011      10/06/2011  
   
                                discharge planning 2011      10/19/2011  
   
                                                      
  
  
Overview:Formatting of this note might be different from the original.  
age 71,  lives in [place], [place]. No DC needs.  
/  
   
   
                                Pre-op testing  2011      10/04/2011  
   
                                                      
  
  
Overview:Formatting of this note is different from the original.  
Images from the original note were not included.  
[facility]  
PRE-OP CHECKLIST  
  
Surgeon: Deangelo Angulo M.D. Informed Consent Completed: No  
STS Score: 1.4%  
CAD: Yes - CAD on Problem List: Yes  
Is intended procedure a CABG: Yes - is a beta blocker ordered? Yes  
  
H & P completed: Yes  
  
PA/LAT: Completed CT: Completed MRI: N/A LE US: N/A  
  
Cath: Yes - reviewed: Yes Echo:Completed EKG: Completed EF %: 61  
  
PI's: N/A Carotid: Completed Mapping: N/A Dental: Completed PFT's: N/A  
  
Basename 11 0729  
WBC 7.18  
HB 13.1  
HCT 41.8  
  
INR 1.0  
CREAT 1.35  
  
UA: Normal  
HCG:N/A  
  
ABO/ABO Confirmed: Yes  
  
Blood ordered: No  
  
SA Swab: Yes - results: Pending  
  
Last Dose of Anticoagulation: vitamins  
  
Op Note: N/A  
  
Pacemaker Check: N/A  
  
Consults: GI 2001  
  
DM: No  
  
Cardiac Surgical prep: No  
  
SIGNATURE: Krystal Castellanos RN CHECKED BY:  
DATE of SERVICE: 2011  
TIME of SERVICE: 2:23 PM  
  
  
   
   
                                Anemia of chronic disease 2011  
   
                                Intestinal polyp 2011  
   
                                                      
  
  
Overview:Formatting of this note might be different from the original.  
Distal ileum ulcerated polyp.  
   
   
                                Nonspecific abnormal results of liver function s  
tudy 2006  
   
                                Impotence of organic origin 2006  
   
                                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  
                 2021  
  
documented as of this encounter (statuses as of 2023)  
[facility]- History of Past illness Narrative*   
  
                                Problem         Noted Date      Resolved Date  
   
                                Medicare annual wellness visit, subsequent 2019  
   
                                Encounter for long-term (current) use of medicat  
ions 2018  
   
                                                      
  
  
Overview:Formatting of this note might be different from the original.  
Added automatically from request for surgery [id number]  
   
   
                                History of colonic polyps 2018  
   
                                                      
  
  
Overview:Formatting of this note might be different from the original.  
Added automatically from request for surgery [id number]  
   
   
                                Gastroesophageal reflux disease 2018  
   
                                                      
  
  
Overview:Formatting of this note might be different from the original.  
Added automatically from request for surgery [id number]  
   
   
                                Acute pulmonary edema 10/05/2011      10/06/2011  
   
                                                      
  
  
Overview:Formatting of this note might be different from the original.  
10/5/2011  
cardiogenic and noncardiogenic factors  
  
   
   
                                Atelectasis     10/05/2011      10/06/2011  
   
                                Hypotension     10/04/2011      10/06/2011  
   
                                                      
  
  
Overview:Formatting of this note might be different from the original.  
10/4/2011  
goal map 65-80  
   
   
                                Stress hyperglycemia 10/04/2011      10/06/2011  
   
                                                      
  
  
Overview:Formatting of this note might be different from the original.  
10/4/2011  
Perioperative insulin resistance and exacerbation of hyperglycemia.  
Control blood glucose with titration of insulin infusion  
  
10/5/2011  
adequate control  
goal FBG<180  
  
   
   
                                Post-op pain    10/04/2011      10/06/2011  
   
                                Mechanically assisted ventilation 10/04/2011      
  10/05/2011  
   
                                                      
  
  
Overview:Formatting of this note might be different from the original.  
10/4/2011  
optimize MV settings, WTE  
current setting:FiO2, 75%, peep 8  
   
   
                                Cardiac insufficiency 10/04/2011      10/05/2011  
   
                                                      
  
  
Overview:Formatting of this note might be different from the original.  
10/4/2011  
RV mild to moderate post pump  
goal CI>2.3  
   
   
                                Hypoxemia       10/04/2011      10/06/2011  
   
                                discharge planning 2011      10/19/2011  
   
                                                      
  
  
Overview:Formatting of this note might be different from the original.  
age 71,  lives in [place], [place]. No DC needs.  
/  
   
   
                                Pre-op testing  2011      10/04/2011  
   
                                                      
  
  
Overview:Formatting of this note is different from the original.  
Images from the original note were not included.  
[facility]  
PRE-OP CHECKLIST  
  
Surgeon: Deangelo Angulo M.D. Informed Consent Completed: No  
STS Score: 1.4%  
CAD: Yes - CAD on Problem List: Yes  
Is intended procedure a CABG: Yes - is a beta blocker ordered? Yes  
  
H & P completed: Yes  
  
PA/LAT: Completed CT: Completed MRI: N/A LE US: N/A  
  
Cath: Yes - reviewed: Yes Echo:Completed EKG: Completed EF %: 61  
  
PI's: N/A Carotid: Completed Mapping: N/A Dental: Completed PFT's: N/A  
  
Basename 11 0729  
WBC 7.18  
HB 13.1  
HCT 41.8  
  
INR 1.0  
CREAT 1.35  
  
UA: Normal  
HCG:N/A  
  
ABO/ABO Confirmed: Yes  
  
Blood ordered: No  
  
SA Swab: Yes - results: Pending  
  
Last Dose of Anticoagulation: vitamins  
  
Op Note: N/A  
  
Pacemaker Check: N/A  
  
Consults: GI 2001  
  
DM: No  
  
Cardiac Surgical prep: No  
  
SIGNATURE: Krystal Castellnaos RN CHECKED BY:  
DATE of SERVICE: 2011  
TIME of SERVICE: 2:23 PM  
  
  
   
   
                                Anemia of chronic disease 2011  
   
                                Intestinal polyp 2011  
   
                                                      
  
  
Overview:Formatting of this note might be different from the original.  
Distal ileum ulcerated polyp.  
   
   
                                Nonspecific abnormal results of liver function s  
tudy 2006  
   
                                Impotence of organic origin 2006  
   
                                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  
                 2021  
  
documented as of this encounter (statuses as of 2023)  
[facility]06- History of Past illness Narrative*   
  
                                Problem         Noted Date      Resolved Date  
   
                                Medicare annual wellness visit, subsequent 2019  
   
                                Encounter for long-term (current) use of medicat  
ions 2018  
   
                                                      
  
  
Overview:Formatting of this note might be different from the original.  
Added automatically from request for surgery [id number]  
   
   
                                History of colonic polyps 2018  
   
                                                      
  
  
Overview:Formatting of this note might be different from the original.  
Added automatically from request for surgery [id number]  
   
   
                                Gastroesophageal reflux disease 2018  
   
                                                      
  
  
Overview:Formatting of this note might be different from the original.  
Added automatically from request for surgery [id number]  
   
   
                                Acute pulmonary edema 10/05/2011      10/06/2011  
   
                                                      
  
  
Overview:Formatting of this note might be different from the original.  
10/5/2011  
cardiogenic and noncardiogenic factors  
  
   
   
                                Atelectasis     10/05/2011      10/06/2011  
   
                                Hypotension     10/04/2011      10/06/2011  
   
                                                      
  
  
Overview:Formatting of this note might be different from the original.  
10/4/2011  
goal map 65-80  
   
   
                                Stress hyperglycemia 10/04/2011      10/06/2011  
   
                                                      
  
  
Overview:Formatting of this note might be different from the original.  
10/4/2011  
Perioperative insulin resistance and exacerbation of hyperglycemia.  
Control blood glucose with titration of insulin infusion  
  
10/5/2011  
adequate control  
goal FBG<180  
  
   
   
                                Post-op pain    10/04/2011      10/06/2011  
   
                                Mechanically assisted ventilation 10/04/2011      
  10/05/2011  
   
                                                      
  
  
Overview:Formatting of this note might be different from the original.  
10/4/2011  
optimize MV settings, WTE  
current setting:FiO2, 75%, peep 8  
   
   
                                Cardiac insufficiency 10/04/2011      10/05/2011  
   
                                                      
  
  
Overview:Formatting of this note might be different from the original.  
10/4/2011  
RV mild to moderate post pump  
goal CI>2.3  
   
   
                                Hypoxemia       10/04/2011      10/06/2011  
   
                                discharge planning 2011      10/19/2011  
   
                                                      
  
  
Overview:Formatting of this note might be different from the original.  
age 71,  lives in [place], [place]. No DC needs.  
/  
   
   
                                Pre-op testing  2011      10/04/2011  
   
                                                      
  
  
Overview:Formatting of this note is different from the original.  
Images from the original note were not included.  
[facility]  
PRE-OP CHECKLIST  
  
Surgeon: Deangelo Angulo M.D. Informed Consent Completed: No  
STS Score: 1.4%  
CAD: Yes - CAD on Problem List: Yes  
Is intended procedure a CABG: Yes - is a beta blocker ordered? Yes  
  
H & P completed: Yes  
  
PA/LAT: Completed CT: Completed MRI: N/A LE US: N/A  
  
Cath: Yes - reviewed: Yes Echo:Completed EKG: Completed EF %: 61  
  
PI's: N/A Carotid: Completed Mapping: N/A Dental: Completed PFT's: N/A  
  
Basename 11 0729  
WBC 7.18  
HB 13.1  
HCT 41.8  
  
INR 1.0  
CREAT 1.35  
  
UA: Normal  
HCG:N/A  
  
ABO/ABO Confirmed: Yes  
  
Blood ordered: No  
  
SA Swab: Yes - results: Pending  
  
Last Dose of Anticoagulation: vitamins  
  
Op Note: N/A  
  
Pacemaker Check: N/A  
  
Consults: GI 2001  
  
DM: No  
  
Cardiac Surgical prep: No  
  
SIGNATURE: Krystal Castellanos RN CHECKED BY:  
DATE of SERVICE: 2011  
TIME of SERVICE: 2:23 PM  
  
  
   
   
                                Anemia of chronic disease 2011  
   
                                Intestinal polyp 2011  
   
                                                      
  
  
Overview:Formatting of this note might be different from the original.  
Distal ileum ulcerated polyp.  
   
   
                                Nonspecific abnormal results of liver function s  
tudy 2006  
   
                                Impotence of organic origin 2006  
   
                                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  
                 2021  
  
documented as of this encounter (statuses as of 2023)  
[facility]06- History of Past illness Narrative*   
  
                                Problem         Noted Date      Resolved Date  
   
                                Medicare annual wellness visit, subsequent 2019  
   
                                Encounter for long-term (current) use of medicat  
ions 2018  
   
                                                      
  
  
Overview:Formatting of this note might be different from the original.  
Added automatically from request for surgery [id number]  
   
   
                                History of colonic polyps 2018  
   
                                                      
  
  
Overview:Formatting of this note might be different from the original.  
Added automatically from request for surgery [id number]  
   
   
                                Gastroesophageal reflux disease 2018  
   
                                                      
  
  
Overview:Formatting of this note might be different from the original.  
Added automatically from request for surgery [id number]  
   
   
                                Acute pulmonary edema 10/05/2011      10/06/2011  
   
                                                      
  
  
Overview:Formatting of this note might be different from the original.  
10/5/2011  
cardiogenic and noncardiogenic factors  
  
   
   
                                Atelectasis     10/05/2011      10/06/2011  
   
                                Hypotension     10/04/2011      10/06/2011  
   
                                                      
  
  
Overview:Formatting of this note might be different from the original.  
10/4/2011  
goal map 65-80  
   
   
                                Stress hyperglycemia 10/04/2011      10/06/2011  
   
                                                      
  
  
Overview:Formatting of this note might be different from the original.  
10/4/2011  
Perioperative insulin resistance and exacerbation of hyperglycemia.  
Control blood glucose with titration of insulin infusion  
  
10/5/2011  
adequate control  
goal FBG<180  
  
   
   
                                Post-op pain    10/04/2011      10/06/2011  
   
                                Mechanically assisted ventilation 10/04/2011      
  10/05/2011  
   
                                                      
  
  
Overview:Formatting of this note might be different from the original.  
10/4/2011  
optimize MV settings, WTE  
current setting:FiO2, 75%, peep 8  
   
   
                                Cardiac insufficiency 10/04/2011      10/05/2011  
   
                                                      
  
  
Overview:Formatting of this note might be different from the original.  
10/4/2011  
RV mild to moderate post pump  
goal CI>2.3  
   
   
                                Hypoxemia       10/04/2011      10/06/2011  
   
                                discharge planning 2011      10/19/2011  
   
                                                      
  
  
Overview:Formatting of this note might be different from the original.  
age 71,  lives in [place], [place]. No DC needs.  
/  
   
   
                                Pre-op testing  2011      10/04/2011  
   
                                                      
  
  
Overview:Formatting of this note is different from the original.  
Images from the original note were not included.  
[facility]  
PRE-OP CHECKLIST  
  
Surgeon: Deangelo Angulo M.D. Informed Consent Completed: No  
STS Score: 1.4%  
CAD: Yes - CAD on Problem List: Yes  
Is intended procedure a CABG: Yes - is a beta blocker ordered? Yes  
  
H & P completed: Yes  
  
PA/LAT: Completed CT: Completed MRI: N/A LE US: N/A  
  
Cath: Yes - reviewed: Yes Echo:Completed EKG: Completed EF %: 61  
  
PI's: N/A Carotid: Completed Mapping: N/A Dental: Completed PFT's: N/A  
  
Basename 11 0729  
WBC 7.18  
HB 13.1  
HCT 41.8  
  
INR 1.0  
CREAT 1.35  
  
UA: Normal  
HCG:N/A  
  
ABO/ABO Confirmed: Yes  
  
Blood ordered: No  
  
SA Swab: Yes - results: Pending  
  
Last Dose of Anticoagulation: vitamins  
  
Op Note: N/A  
  
Pacemaker Check: N/A  
  
Consults: GI 2001  
  
DM: No  
  
Cardiac Surgical prep: No  
  
SIGNATURE: Krystal Castellanos RN CHECKED BY:  
DATE of SERVICE: 2011  
TIME of SERVICE: 2:23 PM  
  
  
   
   
                                Anemia of chronic disease 2011  
   
                                Intestinal polyp 2011  
   
                                                      
  
  
Overview:Formatting of this note might be different from the original.  
Distal ileum ulcerated polyp.  
   
   
                                Nonspecific abnormal results of liver function s  
tudy 2006  
   
                                Impotence of organic origin 2006  
   
                                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  
                 2021  
  
documented as of this encounter (statuses as of 2023)  
[facility]06- History of Past illness Narrative*   
  
                          Problem      Noted Date   Diagnosed Date Resolved Date  
   
                          Medicare annual wellness visit, subsequent 2019  
   
                                                    Encounter for long-term (cur  
rent) use of   
medications         2018  
   
                                                      
  
  
Overview:Formatting of this note might be different from the original.  
Added automatically from request for surgery [id number]  
   
   
                          History of colonic polyps 2018  
   
                                                      
  
  
Overview:Formatting of this note might be different from the original.  
Added automatically from request for surgery [id number]  
   
   
                          Gastroesophageal reflux disease 2018  
   
                                                      
  
  
Overview:Formatting of this note might be different from the original.  
Added automatically from request for surgery [id number]  
   
   
                          Acute pulmonary edema 10/05/2011                10/06/  
2011  
   
                                                      
  
  
Overview:Formatting of this note might be different from the original.  
10/5/2011  
cardiogenic and noncardiogenic factors  
  
   
   
                          Atelectasis  10/05/2011                10/06/2011  
   
                          Hypotension  10/04/2011                10/06/2011  
   
                                                      
  
  
Overview:Formatting of this note might be different from the original.  
10/4/2011  
goal map 65-80  
   
   
                          Stress hyperglycemia 10/04/2011                10/06/2  
011  
   
                                                      
  
  
Overview:Formatting of this note might be different from the original.  
10/4/2011  
Perioperative insulin resistance and exacerbation of hyperglycemia.  
Control blood glucose with titration of insulin infusion  
  
10/5/2011  
adequate control  
goal FBG<180  
  
   
   
                          Post-op pain 10/04/2011                10/06/2011  
   
                          Mechanically assisted ventilation 10/04/2011            
      10/05/2011  
   
                                                      
  
  
Overview:Formatting of this note might be different from the original.  
10/4/2011  
optimize MV settings, WTE  
current setting:FiO2, 75%, peep 8  
   
   
                          Cardiac insufficiency 10/04/2011                10/05/  
2011  
   
                                                      
  
  
Overview:Formatting of this note might be different from the original.  
10/4/2011  
RV mild to moderate post pump  
goal CI>2.3  
   
   
                          Hypoxemia    10/04/2011                10/06/2011  
   
                          discharge planning 2011                10/19/201  
1  
   
                                                      
  
  
Overview:Formatting of this note might be different from the original.  
age 71,  lives in [place], [place]. No DC needs.  
/  
   
   
                          Pre-op testing 2011                10/04/2011  
   
                                                      
  
  
Overview:Formatting of this note is different from the original.  
Images from the original note were not included.  
[facility]  
PRE-OP CHECKLIST  
  
Surgeon: Deangelo Angulo M.D. Informed Consent Completed: No  
STS Score: 1.4%  
CAD: Yes - CAD on Problem List: Yes  
Is intended procedure a CABG: Yes - is a beta blocker ordered? Yes  
  
H & P completed: Yes  
  
PA/LAT: Completed CT: Completed MRI: N/A LE US: N/A  
  
Cath: Yes - reviewed: Yes Echo:Completed EKG: Completed EF %: 61  
  
PI's: N/A Carotid: Completed Mapping: N/A Dental: Completed PFT's: N/A  
  
Basename 11 0729  
WBC 7.18  
HB 13.1  
HCT 41.8  
  
INR 1.0  
CREAT 1.35  
  
UA: Normal  
HCG:N/A  
  
ABO/ABO Confirmed: Yes  
  
Blood ordered: No  
  
SA Swab: Yes - results: Pending  
  
Last Dose of Anticoagulation: vitamins  
  
Op Note: N/A  
  
Pacemaker Check: N/A  
  
Consults: GI 2001  
  
DM: No  
  
Cardiac Surgical prep: No  
  
SIGNATURE: Krystal Castellanos RN CHECKED BY:  
DATE of SERVICE: 2011  
TIME of SERVICE: 2:23 PM  
  
  
   
   
                          Anemia of chronic disease 2011  
   
                          Intestinal polyp 2011  
   
                                                      
  
  
Overview:Formatting of this note might be different from the original.  
Distal ileum ulcerated polyp.  
   
   
                                                    Nonspecific abnormal results  
 of liver   
function study      2006  
   
                          Impotence of organic origin 2006  
   
                                                    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  
9.9 (morbid   
obesity)                                                    2021  
  
documented as of this encounter (statuses as of 2023)  
[facility]- History of Past illness Narrative*   
  
                          Problem      Noted Date   Diagnosed Date Resolved Date  
   
                          Medicare annual wellness visit, subsequent 2019  
   
                                                    Encounter for long-term (cur  
rent) use of   
medications         2018  
   
                                                      
  
  
Overview:Formatting of this note might be different from the original.  
Added automatically from request for surgery [id number]  
   
   
                          History of colonic polyps 2018  
   
                                                      
  
  
Overview:Formatting of this note might be different from the original.  
Added automatically from request for surgery [id number]  
   
   
                          Gastroesophageal reflux disease 2018  
   
                                                      
  
  
Overview:Formatting of this note might be different from the original.  
Added automatically from request for surgery [id number]  
   
   
                          Acute pulmonary edema 10/05/2011                10/06/  
2011  
   
                                                      
  
  
Overview:Formatting of this note might be different from the original.  
10/5/2011  
cardiogenic and noncardiogenic factors  
  
   
   
                          Atelectasis  10/05/2011                10/06/2011  
   
                          Hypotension  10/04/2011                10/06/2011  
   
                                                      
  
  
Overview:Formatting of this note might be different from the original.  
10/4/2011  
goal map 65-80  
   
   
                          Stress hyperglycemia 10/04/2011                10/06/2  
011  
   
                                                      
  
  
Overview:Formatting of this note might be different from the original.  
10/4/2011  
Perioperative insulin resistance and exacerbation of hyperglycemia.  
Control blood glucose with titration of insulin infusion  
  
10/5/2011  
adequate control  
goal FBG<180  
  
   
   
                          Post-op pain 10/04/2011                10/06/2011  
   
                          Mechanically assisted ventilation 10/04/2011            
      10/05/2011  
   
                                                      
  
  
Overview:Formatting of this note might be different from the original.  
10/4/2011  
optimize MV settings, WTE  
current setting:FiO2, 75%, peep 8  
   
   
                          Cardiac insufficiency 10/04/2011                10/05/  
2011  
   
                                                      
  
  
Overview:Formatting of this note might be different from the original.  
10/4/2011  
RV mild to moderate post pump  
goal CI>2.3  
   
   
                          Hypoxemia    10/04/2011                10/06/2011  
   
                          discharge planning 2011                10/19/201  
1  
   
                                                      
  
  
Overview:Formatting of this note might be different from the original.  
age 71,  lives in [place], [place]. No DC needs.  
/  
   
   
                          Pre-op testing 2011                10/04/2011  
   
                                                      
  
  
Overview:Formatting of this note is different from the original.  
Images from the original note were not included.  
[facility]  
PRE-OP CHECKLIST  
  
Surgeon: Deangelo Angulo M.D. Informed Consent Completed: No  
STS Score: 1.4%  
CAD: Yes - CAD on Problem List: Yes  
Is intended procedure a CABG: Yes - is a beta blocker ordered? Yes  
  
H & P completed: Yes  
  
PA/LAT: Completed CT: Completed MRI: N/A LE US: N/A  
  
Cath: Yes - reviewed: Yes Echo:Completed EKG: Completed EF %: 61  
  
PI's: N/A Carotid: Completed Mapping: N/A Dental: Completed PFT's: N/A  
  
Basename 11 0729  
WBC 7.18  
HB 13.1  
HCT 41.8  
  
INR 1.0  
CREAT 1.35  
  
UA: Normal  
HCG:N/A  
  
ABO/ABO Confirmed: Yes  
  
Blood ordered: No  
  
SA Swab: Yes - results: Pending  
  
Last Dose of Anticoagulation: vitamins  
  
Op Note: N/A  
  
Pacemaker Check: N/A  
  
Consults: GI 2001  
  
DM: No  
  
Cardiac Surgical prep: No  
  
SIGNATURE: Krystal Castellanos RN CHECKED BY:  
DATE of SERVICE: 2011  
TIME of SERVICE: 2:23 PM  
  
  
   
   
                          Anemia of chronic disease 2011  
   
                          Intestinal polyp 2011  
   
                                                      
  
  
Overview:Formatting of this note might be different from the original.  
Distal ileum ulcerated polyp.  
   
   
                                                    Nonspecific abnormal results  
 of liver   
function study      2006  
   
                          Impotence of organic origin 2006  
   
                                                    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  
9.9 (morbid   
obesity)                                                    2021  
  
documented as of this encounter (statuses as of 2023)  
[facility]- History of Past illness Narrative*   
  
                          Problem      Noted Date   Diagnosed Date Resolved Date  
   
                          Medicare annual wellness visit, subsequent 2019  
   
                                                    Encounter for long-term (cur  
rent) use of   
medications         2018  
   
                                                      
  
  
Overview:Formatting of this note might be different from the original.  
Added automatically from request for surgery [id number]  
   
   
                          History of colonic polyps 2018  
   
                                                      
  
  
Overview:Formatting of this note might be different from the original.  
Added automatically from request for surgery [id number]  
   
   
                          Gastroesophageal reflux disease 2018  
   
                                                      
  
  
Overview:Formatting of this note might be different from the original.  
Added automatically from request for surgery [id number]  
   
   
                          Acute pulmonary edema 10/05/2011                10/06/  
2011  
   
                                                      
  
  
Overview:Formatting of this note might be different from the original.  
10/5/2011  
cardiogenic and noncardiogenic factors  
  
   
   
                          Atelectasis  10/05/2011                10/06/2011  
   
                          Hypotension  10/04/2011                10/06/2011  
   
                                                      
  
  
Overview:Formatting of this note might be different from the original.  
10/4/2011  
goal map 65-80  
   
   
                          Stress hyperglycemia 10/04/2011                10/06/2  
011  
   
                                                      
  
  
Overview:Formatting of this note might be different from the original.  
10/4/2011  
Perioperative insulin resistance and exacerbation of hyperglycemia.  
Control blood glucose with titration of insulin infusion  
  
10/5/2011  
adequate control  
goal FBG<180  
  
   
   
                          Post-op pain 10/04/2011                10/06/2011  
   
                          Mechanically assisted ventilation 10/04/2011            
      10/05/2011  
   
                                                      
  
  
Overview:Formatting of this note might be different from the original.  
10/4/2011  
optimize MV settings, WTE  
current setting:FiO2, 75%, peep 8  
   
   
                          Cardiac insufficiency 10/04/2011                10/05/  
2011  
   
                                                      
  
  
Overview:Formatting of this note might be different from the original.  
10/4/2011  
RV mild to moderate post pump  
goal CI>2.3  
   
   
                          Hypoxemia    10/04/2011                10/06/2011  
   
                          discharge planning 2011                10/19/201  
1  
   
                                                      
  
  
Overview:Formatting of this note might be different from the original.  
age 71,  lives in [place], [place]. No DC needs.  
/  
   
   
                          Pre-op testing 2011                10/04/2011  
   
                                                      
  
  
Overview:Formatting of this note is different from the original.  
Images from the original note were not included.  
[facility]  
PRE-OP CHECKLIST  
  
Surgeon: Deangelo Angulo M.D. Informed Consent Completed: No  
STS Score: 1.4%  
CAD: Yes - CAD on Problem List: Yes  
Is intended procedure a CABG: Yes - is a beta blocker ordered? Yes  
  
H & P completed: Yes  
  
PA/LAT: Completed CT: Completed MRI: N/A LE US: N/A  
  
Cath: Yes - reviewed: Yes Echo:Completed EKG: Completed EF %: 61  
  
PI's: N/A Carotid: Completed Mapping: N/A Dental: Completed PFT's: N/A  
  
Basename 11 0729  
WBC 7.18  
HB 13.1  
HCT 41.8  
  
INR 1.0  
CREAT 1.35  
  
UA: Normal  
HCG:N/A  
  
ABO/ABO Confirmed: Yes  
  
Blood ordered: No  
  
SA Swab: Yes - results: Pending  
  
Last Dose of Anticoagulation: vitamins  
  
Op Note: N/A  
  
Pacemaker Check: N/A  
  
Consults: GI 2001  
  
DM: No  
  
Cardiac Surgical prep: No  
  
SIGNATURE: Krystal Castellanos RN CHECKED BY:  
DATE of SERVICE: 2011  
TIME of SERVICE: 2:23 PM  
  
  
   
   
                          Anemia of chronic disease 2011  
   
                          Intestinal polyp 2011  
   
                                                      
  
  
Overview:Formatting of this note might be different from the original.  
Distal ileum ulcerated polyp.  
   
   
                                                    Nonspecific abnormal results  
 of liver   
function study      2006  
   
                          Impotence of organic origin 2006  
   
                                                    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  
9.9 (morbid   
obesity)                                                    2021  
  
documented as of this encounter (statuses as of 2023)  
[facility] [place] [facility]   
  
                                                    Diagnosis  
   
                                                      
  
  
Essential hypertension- Primary  
  
  
Unspecified essential hypertension  
  
documented in this encounter  
[facility] [place] [facility]   
  
                                                    Diagnosis  
   
                                                      
  
  
Gastroesophageal reflux disease, unspecified whether esophagitis present  
   
                                                      
  
  
Essential hypertension  
  
  
Unspecified essential hypertension  
   
                                                      
  
  
Mixed hyperlipidemia  
   
                                                      
  
  
Aortic valve disorder  
  
  
Aortic valve disorders  
   
                                                      
  
  
Coronary artery disease involving native coronary artery of native heart without
   
angina pectoris  
  
documented in this encounter  
[facility] [place] [facility]   
  
                                                    Diagnosis  
   
                                                      
  
  
Adenomatous polyp of colon, unspecified part of colon- Primary  
   
                                                      
  
  
Impaired fasting glucose  
   
                                                      
  
  
Stage 3 chronic kidney disease, unspecified whether stage 3a or 3b CKD (HCC)  
   
                                                      
  
  
Essential hypertension  
  
  
Unspecified essential hypertension  
   
                                                      
  
  
Anemia of chronic disease  
  
  
Anemia of other chronic disease  
   
                                                      
  
  
Thrombocytopenia (HCC)  
  
  
Thrombocytopenia, unspecified  
  
documented in this encounter  
[facility] [place] [facility]   
  
                                                    Diagnosis  
   
                                                      
  
  
Peripheral polyneuropathy- Primary  
  
  
Unspecified hereditary and idiopathic peripheral neuropathy  
   
                                                      
  
  
Frequent falls  
  
  
Personal history of fall  
   
                                                      
  
  
Contusion of hip, unspecified laterality, subsequent encounter  
   
                                                      
  
  
Muscular weakness  
  
  
Muscle weakness (generalized)  
   
                                                      
  
  
Abnormality of gait  
  
documented in this encounter  
[facility]   
  
                                                    Diagnosis  
   
                                                      
  
  
Frequent falls- Primary  
  
  
Personal history of fall  
   
                                                      
  
  
Peripheral polyneuropathy  
  
  
Unspecified hereditary and idiopathic peripheral neuropathy  
  
documented in this encounter  
[facility]   
  
                                                    Diagnosis  
   
                                                      
  
  
Peripheral polyneuropathy- Primary  
  
  
Unspecified hereditary and idiopathic peripheral neuropathy  
   
                                                      
  
  
Abnormality of gait  
   
                                                      
  
  
Stage 3a chronic kidney disease (HCC)  
   
                                                      
  
  
Essential hypertension  
  
  
Unspecified essential hypertension  
  
documented in this encounter  
[facility] [place] [facility]   
  
                                                    Diagnosis  
   
                                                      
  
  
Personal history of falling, presenting hazards to health- Primary  
  
  
Personal history of fall  
   
                                                      
  
  
Peripheral polyneuropathy  
  
  
Unspecified hereditary and idiopathic peripheral neuropathy  
   
                                                      
  
  
Abnormality of gait  
  
documented in this encounter  
[facility]   
  
                                                    Diagnosis  
   
                                                      
  
  
Monoclonal gammopathy- Primary  
  
  
Monoclonal paraproteinemia  
   
                                                      
  
  
Macrocytic anemia  
  
  
Unspecified deficiency anemia  
   
                                                      
  
  
Thrombocytopenia (HCC)  
  
  
Thrombocytopenia, unspecified  
  
documented in this encounter  
[facility]   
  
                                                    Diagnosis  
   
                                                      
  
  
Cirrhosis of liver without ascites, unspecified hepatic cirrhosis type (HCC)- 
Primary  
   
                                                      
  
  
Splenomegaly  
   
                                                      
  
  
Liver lesion  
  
  
Other specified disorders of liver  
   
                                                      
  
  
Metabolic syndrome  
  
  
Dysmetabolic Syndrome X  
   
                                                      
  
  
Lower extremity edema  
  
  
Edema  
   
                                                      
  
  
Healthcare maintenance  
  
  
Routine general medical examination at a health care facility  
  
documented in this encounter  
[facility]   
  
                                                    Diagnosis  
   
                                                      
  
  
Cirrhosis of liver without ascites, unspecified hepatic cirrhosis type (HCC)- 
Primary  
   
                                                      
  
  
Claustrophobia  
  
  
Other isolated or specific phobias  
  
documented in this encounter  
[facility]   
  
                                                    Diagnosis  
   
                                                      
  
  
Stasis dermatitis of both legs- Primary  
  
  
Varicose veins of lower extremities with inflammation  
   
                                                      
  
  
Local skin infection  
  
  
Unspecified local infection of skin and subcutaneous tissue  
   
                                                      
  
  
Urge incontinence of urine  
  
  
Urge incontinence  
   
                                                      
  
  
Peripheral polyneuropathy  
  
  
Unspecified hereditary and idiopathic peripheral neuropathy  
  
documented in this encounter  
[facility]   
  
                                                    Diagnosis  
   
                                                      
  
  
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  
  
  
Iron deficiency anemia secondary to blood loss (chronic)  
   
                                                      
  
  
Iron malabsorption  
  
  
Other specified intestinal malabsorption  
  
documented in this encounter  
[facility]   
  
                                                    Diagnosis  
   
                                                      
  
  
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  
  
  
Iron deficiency anemia secondary to blood loss (chronic)  
   
                                                      
  
  
Iron malabsorption  
  
  
Other specified intestinal malabsorption  
  
documented in this encounter  
[facility]   
  
                                                    Diagnosis  
   
                                                      
  
  
Iron deficiency anemia, unspecified iron deficiency anemia type- Primary  
   
                                                      
  
  
History of colonic polyps  
  
  
Personal history of colonic polyps  
   
                                                      
  
  
History of cirrhosis  
  
  
Personal history of other diseases of digestive system  
  
documented in this encounter  
[facility]   
  
                                                    Diagnosis  
   
                                                      
  
  
Cirrhosis of liver without ascites, unspecified hepatic cirrhosis type (HCC)- 
Primary  
   
                                                      
  
  
Liver mass  
  
  
Unspecified disorder of liver  
   
                                                      
  
  
Splenomegaly  
  
documented in this encounter  
[facility]   
  
                                                    Diagnosis  
   
                                                      
  
  
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  
  
  
Iron deficiency anemia secondary to blood loss (chronic)  
   
                                                      
  
  
Iron malabsorption  
  
  
Other specified intestinal malabsorption  
  
documented in this encounter  
[facility]   
  
                                                    Diagnosis  
   
                                                      
  
  
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  
  
  
Iron deficiency anemia secondary to blood loss (chronic)  
   
                                                      
  
  
Iron malabsorption  
  
  
Other specified intestinal malabsorption  
  
documented in this encounter  
[facility]   
  
                                                    Diagnosis  
   
                                                      
  
  
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  
  
  
Iron deficiency anemia secondary to blood loss (chronic)  
   
                                                      
  
  
Iron malabsorption  
  
  
Other specified intestinal malabsorption  
  
documented in this encounter  
[facility]   
  
                                                    Diagnosis  
   
                                                      
  
  
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  
  
  
Iron deficiency anemia secondary to blood loss (chronic)  
   
                                                      
  
  
Iron malabsorption  
  
  
Other specified intestinal malabsorption  
  
documented in this encounter  
[facility]   
  
                                                    Diagnosis  
   
                                                      
  
  
IgG monoclonal gammopathy- Primary  
  
  
Monoclonal paraproteinemia  
   
                                                      
  
  
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  
  
  
Iron deficiency anemia secondary to blood loss (chronic)  
   
                                                      
  
  
Iron malabsorption  
  
  
Other specified intestinal malabsorption  
   
                                                      
  
  
Liver mass  
  
  
Unspecified disorder of liver  
   
                                                      
  
  
Splenomegaly  
   
                                                      
  
  
Hypercalcemia  
  
documented in this encounter  
[facility]   
  
                                                    Diagnosis  
   
                                                      
  
  
MGUS (monoclonal gammopathy of unknown significance)- Primary  
  
  
Monoclonal paraproteinemia  
   
                                                      
  
  
IgG monoclonal gammopathy  
  
  
Monoclonal paraproteinemia  
   
                                                      
  
  
Thrombocytopenia (HCC)  
  
  
Thrombocytopenia, unspecified  
   
                                                      
  
  
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  
  
  
Iron deficiency anemia secondary to blood loss (chronic)  
   
                                                      
  
  
Hyperparathyroidism (HCC)  
  
  
Hyperparathyroidism, unspecified  
   
                                                      
  
  
Other cirrhosis of liver (HCC)  
  
documented in this encounter  
[facility]   
  
                                                    Diagnosis  
   
                                                      
  
  
Urge incontinence of urine  
  
  
Urge incontinence  
  
documented in this encounter  
[facility]   
  
                                                    Diagnosis  
   
                                                      
  
  
Venous stasis ulcer of right calf limited to breakdown of skin without varicose 
veins   
(HCC)- Primary  
   
                                                      
  
  
Cellulitis of right lower leg  
  
  
Cellulitis and abscess of leg, except foot  
  
documented in this encounter  
[facility]   
  
                                                    Diagnosis  
   
                                                      
  
  
Venous stasis ulcer of other part of right lower leg limited to breakdown of 
skin   
without varicose veins (HCC)- Primary  
   
                                                      
  
  
Rash  
  
  
Rash and other nonspecific skin eruption  
   
                                                      
  
  
Partial thickness burn of left hand, unspecified site of hand, initial encounter  
   
                                                      
  
  
Cirrhosis of liver without ascites, unspecified hepatic cirrhosis type (HCC)  
  
documented in this encounter  
[facility]   
  
                                                    Diagnosis  
   
                                                      
  
  
Cirrhosis of liver without ascites, unspecified hepatic cirrhosis type (HCC)- 
Primary  
   
                                                      
  
  
Hepatocellular carcinoma (HCC)  
  
  
Malignant neoplasm of liver, primary  
  
documented in this encounter  
[facility]   
  
                                                    Diagnosis  
   
                                                      
  
  
Cirrhosis of liver without ascites, unspecified hepatic cirrhosis type (HCC)  
   
                                                      
  
  
Liver mass  
  
  
Unspecified disorder of liver  
   
                                                      
  
  
Splenomegaly  
  
documented in this encounter  
[facility]   
  
                                                    Diagnosis  
   
                                                      
  
  
Hepatocellular carcinoma (HCC)- Primary  
  
  
Malignant neoplasm of liver, primary  
  
documented in this encounter  
[facility] [place] [facility]   
  
                                                    Diagnosis  
   
                                                      
  
  
Hepatocellular carcinoma (HCC)- Primary  
  
  
Malignant neoplasm of liver, primary  
   
                                                      
  
  
Cirrhosis of liver without ascites, unspecified hepatic cirrhosis type (HCC)  
  
documented in this encounter  
[facility] [place] [facility]   
  
                                                    Diagnosis  
   
                                                      
  
  
Cirrhosis of liver without ascites, unspecified hepatic cirrhosis type (HCC)- 
Primary  
  
documented in this encounter  
[facility] [place] [facility]   
  
                                                    Diagnosis  
   
                                                      
  
  
Hepatocellular carcinoma (HCC)- Primary  
  
  
Malignant neoplasm of liver, primary  
  
documented in this encounter  
[facility] [place] [facility]   
  
                                                    Diagnosis  
   
                                                      
  
  
Hepatocellular carcinoma (HCC)- Primary  
  
  
Malignant neoplasm of liver, primary  
  
documented in this encounter  
[facility]   
  
                                                    Diagnosis  
   
                                                      
  
  
Unspecified essential hypertension  
   
                                                      
  
  
Urge incontinence of urine  
  
  
Urge incontinence  
  
documented in this encounter  
[facility] [place] [facility]   
  
                                                    Diagnosis  
   
                                                      
  
  
Coronary artery disease involving native coronary artery of native heart without
   
angina pectoris  
   
                                                      
  
  
Urge incontinence of urine  
  
  
Urge incontinence  
  
documented in this encounter  
[facility] [place] [facility]   
  
                                                    Diagnosis  
   
                                                      
  
  
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  
  
  
Iron deficiency anemia secondary to blood loss (chronic)  
   
                                                      
  
  
Macrocytic anemia  
  
  
Unspecified deficiency anemia  
   
                                                      
  
  
Hepatocellular carcinoma (HCC)  
  
  
Malignant neoplasm of liver, primary  
   
                                                      
  
  
Thrombocytopenia (HCC)  
  
  
Thrombocytopenia, unspecified  
   
                                                      
  
  
Hyperparathyroidism (HCC)  
  
  
Hyperparathyroidism, unspecified  
   
                                                      
  
  
MGUS (monoclonal gammopathy of unknown significance)  
  
  
Monoclonal paraproteinemia  
  
documented in this encounter  
[facility] [place] [facility]   
  
                                                    Diagnosis  
   
                                                      
  
  
New onset atrial flutter (HCC)- Primary  
  
  
Atrial flutter  
   
                                                      
  
  
Coronary artery disease involving native coronary artery of native heart without
   
angina pectoris  
  
documented in this encounter  
[facility] [place] [facility]   
  
                                                    Diagnosis  
   
                                                      
  
  
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  
  
  
Iron deficiency anemia secondary to blood loss (chronic)  
   
                                                      
  
  
Iron malabsorption  
  
  
Other specified intestinal malabsorption  
  
documented in this encounter  
[facility] [place] [facility]   
  
                                                    Diagnosis  
   
                                                      
  
  
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  
  
  
Iron deficiency anemia secondary to blood loss (chronic)  
   
                                                      
  
  
Iron malabsorption  
  
  
Other specified intestinal malabsorption  
  
documented in this encounter  
[facility]   
  
                                                    Diagnosis  
   
                                                      
  
  
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  
  
  
Iron deficiency anemia secondary to blood loss (chronic)  
   
                                                      
  
  
Iron malabsorption  
  
  
Other specified intestinal malabsorption  
  
documented in this encounter  
[facility]   
  
                                                    Diagnosis  
   
                                                      
  
  
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  
  
  
Iron deficiency anemia secondary to blood loss (chronic)  
   
                                                      
  
  
Iron malabsorption  
  
  
Other specified intestinal malabsorption  
  
documented in this encounter  
[facility] note*   
  
                                                    Diagnosis  
   
                                                      
  
  
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  
  
  
Iron deficiency anemia secondary to blood loss (chronic)  
   
                                                      
  
  
Iron malabsorption  
  
  
Other specified intestinal malabsorption  
  
documented in this encounter  
[facility] [place] [facility]   
  
                                                    Diagnosis  
   
                                                      
  
  
Melena- Primary  
  
  
Blood in stool  
  
documented in this encounter  
[facility] note*   
  
                                                    Diagnosis  
   
                                                      
  
  
Monoclonal gammopathy  
  
  
Monoclonal paraproteinemia  
   
                                                      
  
  
Macrocytic anemia  
  
  
Unspecified deficiency anemia  
   
                                                      
  
  
Thrombocytopenia (HCC)  
  
  
Thrombocytopenia, unspecified  
  
documented in this encounter  
[facility] note*   
  
                                                    Diagnosis  
   
                                                      
  
  
Liver disease  
  
  
Unspecified disorder of liver  
  
documented in this encounter  
[facility]'s home Plan of care note* Visit Details  
  
                                                    Visit Type -PT SOC  
Discipline -Physical Therapy  
  
  
                                    Problems  
  
                    Problem   Description Start Date Status    Goals     Interve  
ntions  
   
                                                      
  
  
PT Learning   
Assessment  
  
  
Disciplines:  
  
  
PT                                                    
  
  
2022                                  
  
  
Active                                    
  
  
1 goal linked to   
scheduled/document  
ed intervention                           
  
  
1 goal   
intervention   
scheduled/document  
ed in this visit  
   
                                                      
  
  
Medication   
Education  
  
  
Disciplines:  
  
  
Skilled   
Services                                              
  
  
2022                                  
  
  
Active                                    
  
  
1 goal linked to   
scheduled/document  
ed intervention                           
  
  
1 goal   
intervention   
scheduled/document  
ed in this visit  
   
                                                      
  
  
Physician   
Specific   
Parameters  
  
  
Disciplines:  
  
  
Skilled   
Services                                              
  
  
2022                                  
  
  
Active                                    
  
  
1 goal linked to   
scheduled/document  
ed intervention                           
  
  
1 goal   
intervention   
scheduled/document  
ed in this visit  
   
                                                      
  
  
Risk for Falls  
  
  
Disciplines:  
  
  
Skilled   
Services                                              
  
  
2022                                  
  
  
Active                                    
  
  
1 goal linked to   
scheduled/document  
ed intervention                           
  
  
1 goal   
intervention   
scheduled/document  
ed in this visit  
   
                                                      
  
  
Advance   
Directives  
  
  
Disciplines:  
  
  
Skilled   
Services                                              
  
  
2022                                  
  
  
Resolved on   
2022                                  
  
  
1 goal linked to   
scheduled/document  
ed intervention                           
  
  
1 goal   
intervention   
scheduled/document  
ed in this visit  
  
  
                                      Goals  
  
                      Goal       Associated Problem Outcome    Goal Met?  Visit   
Notes  
   
                                                      
  
  
Demonstrate understanding of   
education  
  
  
Description:  
  
  
Patient and/or caregiver   
will understand educational   
instruction to be achieved   
by 10/1/22. .                             
  
  
PT Learning Assessment                    
  
                                          
  
  
No                                        
   
                                                      
  
  
Patient/caregiver will   
demonstrate ability to   
obtain, store, identify and   
administer ordered   
medications, keep accurate   
medication list in home, and   
adhere to medication   
schedule  
  
  
Description:  
  
  
Patient/caregiver will   
demonstrate ability to   
obtain, store, identify and   
administer ordered   
medications, keep accurate   
medication list in home, and   
adhere to medication   
schedule by 10/1/22.  
  
                                          
  
  
Medication Education                      
  
                                          
  
  
No                                        
   
                                                      
  
  
Patient to maintain   
parameters within   
physician-specified ranges   
throughout certification   
period                                    
  
  
Physician Specific   
Parameters                                
  
                                          
  
  
No                                        
   
                                                      
  
  
Manage Risk for falls  
  
  
Description:  
  
  
Patient/caregiver will   
verbalize knowledge of   
individualized fall   
prevention strategies by   
10/1/22. .                                
  
  
Risk for Falls                            
  
                                          
  
  
No                                        
   
                                                      
  
  
Patient/caregiver will make   
healthcare providers aware   
of and any changes to   
Advance Directives   
throughout certification   
period                                    
  
  
Advance Directives                        
  
  
Completed                                 
  
  
Yes                                       
  
  
                                  Interventions  
  
                                        Intervention        Associated   
Problem/Goal        Status              Variance            Visit Notes  
   
                                                      
  
  
Instruct and educate   
on knowledge deficits                   Problem:PT Learning   
Assessment  
Goal:Demonstrate   
understanding of   
education                                 
  
  
Completed                                 
  
                                          
  
  
patient verbalize   
and/or demonstrate   
understanding of   
physical therapy   
education including   
fall prevention   
strategies, home   
safety and home   
exercise program.  
  
  
  
  
  
Education methods   
include: verbal cues.  
  
  
  
  
  
Further education   
required to improve   
knowledge and   
compliance with fall   
prevention   
strategies, home   
safety, functional   
activity and home   
exercise program.  
   
                                                      
  
  
Medication Education  
  
  
Description:  
  
  
Evaluate/instruct   
patient/caregiver on   
obtaining, storing,   
identifying and   
administering ordered   
medications as well as   
keeping accurate   
medication list in the   
home and adhereing to   
medication schedule                     Problem:Medication   
Education  
Goal:Patient/caregive  
r will demonstrate   
ability to obtain,   
store, identify and   
administer ordered   
medications, keep   
accurate medication   
list in home, and   
adhere to medication   
schedule                                  
  
  
Completed                                 
  
                                          
  
  
Patient instructed on   
importance of keeping   
accurate medication   
list in home and   
adhering to   
medication schedule.  
   
                                                      
  
  
SPO2  
  
  
Description:  
  
  
Notify Dr. Ahuja   
if pulse ox is <92% at   
rest.                                   Problem:Physician   
Specific Parameters  
Goal:Patient to   
maintain parameters   
within   
physician-specified   
ranges throughout   
certification period                      
  
  
Completed                                 
  
                                          
   
                                                      
  
  
Instruct on individual   
fall risk factors and   
strategies to prevent   
falls and injuries   
caused by falls.                        Problem:Risk for   
Falls  
Goal:Manage Risk for   
falls                                     
  
  
Completed                                 
  
                                          
  
  
PT: Patient   
instructed on  
  
  
Eliminating   
Environmental   
Hazards: Keep   
pathways clear, Keep   
pets out of pathways,   
Remove unsafe rugs,   
Move furniture from   
pathways and Wear   
supportive shoes or   
non-skid socks  
  
  
Managing Impaired   
Functional Mobility:   
Use assistive   
device(s): front   
wheeled walker  
  
  
  
  
  
   
                                                      
  
  
Determine patient's   
Advance Directive   
Status  
  
  
Description:  
  
  
Patient does have   
advance directives.  
  
  
Patient's Advance   
Directives determined   
to be  
  
  
available in Home and   
[facility] DPOA   
and Living Will.  
  
  
  
  
  
  
  
  
  
  
                                        Problem:Advance   
Directives  
Goal:Patient/caregive  
r will make   
healthcare providers   
aware of and any   
changes to Advance   
Directives throughout   
certification period                      
  
  
Completed                                 
  
                                          
  
  
Discussed Advance   
Directives with   
Patient and/or   
Caregiver.  
  
  
Referred patient to   
Home Care handbook   
for further   
information on   
Healthcare DPOA &   
Living Will.  
  
documented in this encounter  
[facility]  
  
                                                    Visit Type -PT ROUTINE  
Discipline -Physical Therapy  
  
  
                                    Problems  
  
                    Problem   Description Start Date Status    Goals     Interve  
ntions  
   
                                                      
  
  
PT Impaired   
muscle   
performance   
and/or ROM  
  
  
Disciplines:  
  
  
PT                                                    
  
  
2022                                  
  
  
Active                                    
  
  
1 goal linked to   
scheduled/document  
ed intervention                           
  
  
1 goal   
intervention   
scheduled/document  
ed in this visit  
   
                                                      
  
  
PT Impaired gait  
  
  
Disciplines:  
  
  
PT                                                    
  
  
2022                                  
  
  
Active                                    
  
  
1 goal linked to   
scheduled/document  
ed intervention                           
  
  
1 goal   
intervention   
scheduled/document  
ed in this visit  
   
                                                      
  
  
PT Learning   
Assessment  
  
  
Disciplines:  
  
  
PT                                                    
  
  
2022                                  
  
  
Active                                    
  
  
1 goal linked to   
scheduled/document  
ed intervention                           
  
  
1 goal   
intervention   
scheduled/document  
ed in this visit  
   
                                                      
  
  
Medication   
Education  
  
  
Disciplines:  
  
  
Skilled Services                                      
  
  
2022                                  
  
  
Active                                    
  
  
1 goal linked to   
scheduled/document  
ed intervention                           
  
  
1 goal   
intervention   
scheduled/document  
ed in this visit  
   
                                                      
  
  
Sepsis  
  
  
Disciplines:  
  
  
Skilled Services                                      
  
  
2022                                  
  
  
Active                                    
  
  
1 goal linked to   
scheduled/document  
ed intervention                           
  
  
1 goal   
intervention   
scheduled/document  
ed in this visit  
   
                                                      
  
  
Physician   
Specific   
Parameters  
  
  
Disciplines:  
  
  
Skilled Services                                      
  
  
2022                                  
  
  
Active                                    
  
  
1 goal linked to   
scheduled/document  
ed intervention                           
  
  
1 goal   
intervention   
scheduled/document  
ed in this visit  
   
                                                      
  
  
Risk for Falls  
  
  
Disciplines:  
  
  
Skilled Services                                      
  
  
2022                                  
  
  
Active                                    
  
  
1 goal linked to   
scheduled/document  
ed intervention                           
  
  
1 goal   
intervention   
scheduled/document  
ed in this visit  
  
  
                                      Goals  
  
                      Goal       Associated Problem Outcome    Goal Met?  Visit   
Notes  
   
                                                      
  
  
Improved Muscle Performance   
and/or ROM  
  
  
Description:  
  
  
LTG: Patient will demonstrate   
improved muscle performance   
to meet functional goals as   
evidenced by ability to   
tolerate 5 sit to stands in   
30 sec and tolerate 10 min of   
a standing activity, to be   
achieved by 10/1/22. .  
  
  
  
  
  
STG: Patient and/or caregiver   
will verbalize/demonstrate   
independence with home   
exercise program, to improve   
functional mobility, to be   
achieved by 9/10/22.  
  
  
  
  
  
LTG: Patient will demonstrate   
improved left hip active   
range of motion to 100* in   
standing degrees, to meet   
functional goals, to be   
achieved by 10/1/22. .                    
  
  
PT Impaired muscle   
performance and/or ROM                    
  
                                          
  
  
No                                        
   
                                                      
  
  
Improved Gait  
  
  
Description:  
  
  
  
  
  
LTG: Patient will demonstrate   
improved gait ability as   
evidenced by ambulation 150   
feet with rollator walker   
independently with AD, to   
return to safe household and   
community ambulation, in   
order to return to plf , to   
be achieved by 10/1/22. .  
  
  
  
  
                                          
  
  
PT Impaired gait                          
  
                                          
  
  
No                                        
   
                                                      
  
  
Demonstrate understanding of   
education  
  
  
Description:  
  
  
Patient and/or caregiver will   
understand educational   
instruction to be achieved by   
10/1/22. .                                
  
  
PT Learning Assessment                    
  
                                          
  
  
No                                        
   
                                                      
  
  
Patient/caregiver will   
demonstrate ability to   
obtain, store, identify and   
administer ordered   
medications, keep accurate   
medication list in home, and   
adhere to medication schedule  
  
  
Description:  
  
  
Patient/caregiver will   
demonstrate ability to   
obtain, store, identify and   
administer ordered   
medications, keep accurate   
medication list in home, and   
adhere to medication schedule   
by 10/1/22.  
  
                                          
  
  
Medication Education                      
  
                                          
  
  
No                                        
   
                                                      
  
  
Patient/caregiver will be   
able to identify and report   
symptoms of sepsis  
  
  
Description:  
  
  
Patient/caregiver will be   
able to identify   
signs/symptoms of sepsis   
infection and will verbalize   
actions to take if suspected   
by 10/1/22. .                             
  
  
Sepsis                                    
  
                                          
  
  
No                                        
   
                                                      
  
  
Patient to maintain   
parameters within   
physician-specified ranges   
throughout certification   
period                                    
  
  
Physician Specific   
Parameters                                
  
                                          
  
  
No                                        
   
                                                      
  
  
Manage Risk for falls  
  
  
Description:  
  
  
Patient/caregiver will   
verbalize knowledge of   
individualized fall   
prevention strategies by   
10/1/22. .                                
  
  
Risk for Falls                            
  
                                          
  
  
No                                        
  
  
                                  Interventions  
  
                                        Intervention        Associated   
Problem/Goal        Status              Variance            Visit Notes  
   
                                                      
  
  
Physical Therapy   
Therapeutic Exercises                   Problem:PT Impaired   
muscle performance   
and/or ROM  
Goal:Improved Muscle   
Performance and/or   
ROM                                       
  
  
Completed                                 
  
                                          
  
  
patient instructed on   
strengthening   
exercises including   
Supine : GS,QS,HS,   
HIPADD, HIP BD, HEEL   
SLIDES, SAQ, X 10  
  
  
  
  
  
SEATED: FAQ, HIP   
FLEX, ,HIP ADD, HIP   
ABD AP X10  
  
  
with verbal and   
tactile cues for   
TECHNIQUE .  
   
                                                      
  
  
Physical Therapy Gait   
Training                                Problem:PT Impaired   
gait  
Goal:Improved Gait                        
  
  
Completed                                 
  
                                          
  
  
Gait training and   
instruction to   
patient on safe   
ambulation with front   
wheeled walker for   
40' x2 feet with   
supervision, with   
verbal cues for   
corrections of gait   
deviations. Pt tends   
to stand more upright   
with his WW vs the   
rollator .  
   
                                                      
  
  
Instruct and educate   
on knowledge deficits                   Problem:PT Learning   
Assessment  
Goal:Demonstrate   
understanding of   
education                                 
  
  
Completed                                 
  
                                          
  
  
patient verbalize   
and/or demonstrate   
understanding of   
physical therapy   
education including   
fall prevention   
strategies and home   
safety.  
  
  
  
  
  
Education methods   
include: verbal cues.  
  
  
  
  
  
Further education   
required to improve   
knowledge and   
compliance with   
pulmonary disease   
management,   
nutrition, home   
safety, functional   
activity and home   
exercise program.  
   
                                                      
  
  
Medication Education  
  
  
Description:  
  
  
Evaluate/instruct   
patient/caregiver on   
obtaining, storing,   
identifying and   
administering ordered   
medications as well as   
keeping accurate   
medication list in the   
home and adhereing to   
medication schedule                     Problem:Medication   
Education  
Goal:Patient/caregive  
r will demonstrate   
ability to obtain,   
store, identify and   
administer ordered   
medications, keep   
accurate medication   
list in home, and   
adhere to medication   
schedule                                  
  
  
Completed                                 
  
                                          
  
  
Patient instructed on   
importance of keeping   
accurate medication   
list in home and   
adhering to   
medication schedule.  
   
                                                      
  
  
Risk of Sepsis  
  
  
Description:  
  
  
Patient is at risk for   
sepsis. Monitor   
closely for s/s of   
sepsis.                                 Problem:Sepsis  
Goal:Patient/caregive  
r will be able to   
identify and report   
symptoms of sepsis                        
  
  
Completed                                 
  
                                          
   
                                                      
  
  
SPO2  
  
  
Description:  
  
  
Notify Dr. Ahuja   
if pulse ox is <92% at   
rest.                                   Problem:Physician   
Specific Parameters  
Goal:Patient to   
maintain parameters   
within   
physician-specified   
ranges throughout   
certification period                      
  
  
Completed                                 
  
                                          
   
                                                      
  
  
Instruct on individual   
fall risk factors and   
strategies to prevent   
falls and injuries   
caused by falls.                        Problem:Risk for   
Falls  
Goal:Manage Risk for   
falls                                     
  
  
Completed                                 
  
                                          
  
  
PT: Patient   
instructed on  
  
  
Eliminating   
Environmental   
Hazards: Keep   
pathways clear, Keep   
pets out of pathways   
and Remove unsafe   
rugs  
  
  
Managing Impaired   
Functional Mobility:   
Use assistive   
device(s): front   
wheeled walker  
  
  
  
  
  
  
documented in this encounter  
[facility]'s home Plan of care note* Visit Details  
  
                                                    Visit Type -PTA ROUTINE  
Discipline -Physical Therapy  
  
  
                                    Problems  
  
                    Problem   Description Start Date Status    Goals     Interve  
ntions  
   
                                                      
  
  
PT Impaired   
muscle   
performance   
and/or ROM  
  
  
Disciplines:  
  
  
PT                                                    
  
  
2022                                  
  
  
Active                                    
  
  
1 goal linked to   
scheduled/document  
ed intervention                           
  
  
1 goal   
intervention   
scheduled/document  
ed in this visit  
   
                                                      
  
  
PT Impaired gait  
  
  
Disciplines:  
  
  
PT                                                    
  
  
2022                                  
  
  
Active                                    
  
  
1 goal linked to   
scheduled/document  
ed intervention                           
  
  
1 goal   
intervention   
scheduled/document  
ed in this visit  
   
                                                      
  
  
PT Learning   
Assessment  
  
  
Disciplines:  
  
  
PT                                                    
  
  
2022                                  
  
  
Active                                    
  
  
1 goal linked to   
scheduled/document  
ed intervention                           
  
  
1 goal   
intervention   
scheduled/document  
ed in this visit  
   
                                                      
  
  
Medication   
Education  
  
  
Disciplines:  
  
  
Skilled Services                                      
  
  
2022                                  
  
  
Active                                    
  
  
1 goal linked to   
scheduled/document  
ed intervention                           
  
  
1 goal   
intervention   
scheduled/document  
ed in this visit  
   
                                                      
  
  
Sepsis  
  
  
Disciplines:  
  
  
Skilled Services                                      
  
  
2022                                  
  
  
Active                                    
  
  
1 goal linked to   
scheduled/document  
ed intervention                           
  
  
1 goal   
intervention   
scheduled/document  
ed in this visit  
   
                                                      
  
  
Physician   
Specific   
Parameters  
  
  
Disciplines:  
  
  
Skilled Services                                      
  
  
2022                                  
  
  
Active                                    
  
  
1 goal linked to   
scheduled/document  
ed intervention                           
  
  
1 goal   
intervention   
scheduled/document  
ed in this visit  
   
                                                      
  
  
Risk for Falls  
  
  
Disciplines:  
  
  
Skilled Services                                      
  
  
2022                                  
  
  
Active                                    
  
  
1 goal linked to   
scheduled/document  
ed intervention                           
  
  
1 goal   
intervention   
scheduled/document  
ed in this visit  
   
                                                      
  
  
Pain  
  
  
Disciplines:  
  
  
Skilled Services                                      
  
  
2022                                  
  
  
Active                                    
  
  
1 goal linked to   
scheduled/document  
ed intervention                           
  
  
1 goal   
intervention   
scheduled/document  
ed in this visit  
   
                                                      
  
  
Discharge  
  
  
Disciplines:  
  
  
Skilled Services                                      
  
  
2022                                  
  
  
Active                                    
  
  
1 goal linked to   
scheduled/document  
ed intervention                           
  
  
1 goal   
intervention   
scheduled/document  
ed in this visit  
  
  
                                      Goals  
  
                      Goal       Associated Problem Outcome    Goal Met?  Visit   
Notes  
   
                                                      
  
  
Improved Muscle Performance   
and/or ROM  
  
  
Description:  
  
  
LTG: Patient will demonstrate   
improved muscle performance   
to meet functional goals as   
evidenced by ability to   
tolerate 5 sit to stands in   
30 sec and tolerate 10 min of   
a standing activity, to be   
achieved by 10/1/22. .  
  
  
  
  
  
STG: Patient and/or caregiver   
will verbalize/demonstrate   
independence with home   
exercise program, to improve   
functional mobility, to be   
achieved by 9/10/22.  
  
  
  
  
  
LTG: Patient will demonstrate   
improved left hip active   
range of motion to 100* in   
standing degrees, to meet   
functional goals, to be   
achieved by 10/1/22. .                    
  
  
PT Impaired muscle   
performance and/or ROM                    
  
                                          
  
  
No                                        
   
                                                      
  
  
Improved Gait  
  
  
Description:  
  
  
  
  
  
LTG: Patient will demonstrate   
improved gait ability as   
evidenced by ambulation 150   
feet with rollator walker   
independently with AD, to   
return to safe household and   
community ambulation, in   
order to return to plf , to   
be achieved by 10/1/22. .  
  
  
  
  
                                          
  
  
PT Impaired gait                          
  
                                          
  
  
No                                        
   
                                                      
  
  
Demonstrate understanding of   
education  
  
  
Description:  
  
  
Patient and/or caregiver will   
understand educational   
instruction to be achieved by   
10/1/22. .                                
  
  
PT Learning Assessment                    
  
                                          
  
  
No                                        
   
                                                      
  
  
Patient/caregiver will   
demonstrate ability to   
obtain, store, identify and   
administer ordered   
medications, keep accurate   
medication list in home, and   
adhere to medication schedule  
  
  
Description:  
  
  
Patient/caregiver will   
demonstrate ability to   
obtain, store, identify and   
administer ordered   
medications, keep accurate   
medication list in home, and   
adhere to medication schedule   
by 10/1/22.  
  
                                          
  
  
Medication Education                      
  
                                          
  
  
No                                        
   
                                                      
  
  
Patient/caregiver will be   
able to identify and report   
symptoms of sepsis  
  
  
Description:  
  
  
Patient/caregiver will be   
able to identify   
signs/symptoms of sepsis   
infection and will verbalize   
actions to take if suspected   
by 10/1/22. .                             
  
  
Sepsis                                    
  
                                          
  
  
No                                        
   
                                                      
  
  
Patient to maintain   
parameters within   
physician-specified ranges   
throughout certification   
period                                    
  
  
Physician Specific   
Parameters                                
  
                                          
  
  
No                                        
   
                                                      
  
  
Manage Risk for falls  
  
  
Description:  
  
  
Patient/caregiver will   
verbalize knowledge of   
individualized fall   
prevention strategies by   
10/1/22. .                                
  
  
Risk for Falls                            
  
                                          
  
  
No                                        
   
                                                      
  
  
Manage Pain  
  
  
Description:  
  
  
Patient/caregiver will   
verbalize knowledge and   
understanding of appropriate   
techniques to control pain,   
including pain medication and   
non-pharmacological   
techniques. Patient will   
verbalize or demonstrate an   
acceptable level of pain as   
evidenced by a pain score of   
<5/10 and improvement in   
ability to perform activities   
of daily living to be   
achieved by 10/1/22. .                    
  
  
Pain                                      
  
                                          
  
  
No                                        
   
                                                      
  
  
Manage discharge planning  
  
  
Description:  
  
  
Patient/caregiver will   
verbalize understanding of   
ongoing discharge plan   
provided related to disease   
management, arrangements for   
outpatient and/or community   
services, obtaining   
medications, supplies, and   
DME, as needed throughout   
certification period.                     
  
  
Discharge                                 
  
                                          
  
  
No                                        
  
  
                                  Interventions  
  
                                        Intervention        Associated   
Problem/Goal        Status              Variance            Visit Notes  
   
                                                      
  
  
Physical Therapy   
Therapeutic Exercises                   Problem:PT Impaired   
muscle performance   
and/or ROM  
Goal:Improved Muscle   
Performance and/or   
ROM                                       
  
  
Completed                                 
  
                                          
  
  
patient instructed on   
strengthening   
exercises including   
supine: ankle pumps,   
quad sets, hip abd and   
adduction, saq and   
heel slides x's 10   
each. seated: laq. hip   
flexion and heel toe   
raises x's 10 each   
with verbal cues for   
correct form and pace.   
patient instructed to   
perform home exercise   
program twice a day   
which included above   
exercises.  
   
                                                      
  
  
Physical Therapy Gait   
Training                                Problem:PT Impaired   
gait  
Goal:Improved Gait                        
  
  
Completed                                 
  
                                          
  
  
Gait training and   
instruction to patient   
on safe ambulation   
with rollator walker   
for 2x's 40 feet with   
supervision, with   
verbal cues for   
corrections of gait   
deviations including   
staying closer to   
rollator.  
   
                                                      
  
  
Instruct and educate   
on knowledge deficits                   Problem:PT Learning   
Assessment  
Goal:Demonstrate   
understanding of   
education                                 
  
  
Completed                                 
  
                                          
  
  
patient verbalize   
and/or demonstrate   
understanding of   
physical therapy   
education including   
home exercise program.  
  
  
  
  
  
Education methods   
include: verbal cues   
and visual cues.  
  
  
  
  
  
Further education   
required to improve   
knowledge and   
compliance with home   
exercise program.  
   
                                                      
  
  
Medication Education  
  
  
Description:  
  
  
Evaluate/instruct   
patient/caregiver on   
obtaining, storing,   
identifying and   
administering ordered   
medications as well   
as keeping accurate   
medication list in   
the home and   
adhereing to   
medication schedule                     Problem:Medication   
Education  
Goal:Patient/caregive  
r will demonstrate   
ability to obtain,   
store, identify and   
administer ordered   
medications, keep   
accurate medication   
list in home, and   
adhere to medication   
schedule                                  
  
  
Completed                                 
  
                                          
  
  
Patient instructed on   
importance of keeping   
accurate medication   
list in home.  
   
                                                      
  
  
Risk of Sepsis  
  
  
Description:  
  
  
Patient is at risk   
for sepsis. Monitor   
closely for s/s of   
sepsis.                                 Problem:Sepsis  
Goal:Patient/caregive  
r will be able to   
identify and report   
symptoms of sepsis                        
  
  
Completed                                 
  
                                          
   
                                                      
  
  
SPO2  
  
  
Description:  
  
  
Notify Dr. Ahuja   
if pulse ox is <92%   
at rest.                                Problem:Physician   
Specific Parameters  
Goal:Patient to   
maintain parameters   
within   
physician-specified   
ranges throughout   
certification period                      
  
  
Completed                                 
  
                                          
   
                                                      
  
  
Instruct on   
individual fall risk   
factors and   
strategies to prevent   
falls and injuries   
caused by falls.                        Problem:Risk for   
Falls  
Goal:Manage Risk for   
falls                                     
  
  
Completed                                 
  
                                          
  
  
PT: Patient instructed   
on Managing Impaired   
Functional Mobility:   
Use assistive   
device(s): rollator   
walker  
  
  
   
                                                      
  
  
Instruct on pain and   
instruct on   
strategies to control   
pain                                    Problem:Pain  
Goal:Manage Pain                          
  
  
Completed                                 
  
                                          
  
  
patient instructed on   
techniques to control   
pain including   
Pharmacological   
measures and   
Non-Pharmacological   
measures; distraction.  
   
                                                      
  
  
Instruct on ongoing   
discharge plan                          Problem:Discharge  
Goal:Manage discharge   
planning                                  
  
  
Completed                                 
  
                                          
  
  
Ongoing Discharge   
plan: Discharge plan   
discussed with patient   
including frequency   
and duration for home   
PT and plan for   
transition to:   
caregiver assistance.  
  
  
  
documented in this encounter  
[facility]'s home Plan of care note* Visit Details  
  
                                                    Visit Type -PTA ROUTINE  
Discipline -Physical Therapy  
  
  
                                    Problems  
  
                    Problem   Description Start Date Status    Goals     Interve  
ntions  
   
                                                      
  
  
PT Impaired   
muscle   
performance   
and/or ROM  
  
  
Disciplines:  
  
  
PT                                                    
  
  
2022                                  
  
  
Active                                    
  
  
1 goal linked to   
scheduled/document  
ed intervention                           
  
  
1 goal   
intervention   
scheduled/document  
ed in this visit  
   
                                                      
  
  
PT Impaired gait  
  
  
Disciplines:  
  
  
PT                                                    
  
  
2022                                  
  
  
Active                                    
  
  
1 goal linked to   
scheduled/document  
ed intervention                           
  
  
1 goal   
intervention   
scheduled/document  
ed in this visit  
   
                                                      
  
  
PT Impaired   
balance  
  
  
Disciplines:  
  
  
PT                                                    
  
  
2022                                  
  
  
Active                                    
  
  
1 goal linked to   
scheduled/document  
ed intervention                           
  
  
1 goal   
intervention   
scheduled/document  
ed in this visit  
   
                                                      
  
  
PT Learning   
Assessment  
  
  
Disciplines:  
  
  
PT                                                    
  
  
2022                                  
  
  
Active                                    
  
  
1 goal linked to   
scheduled/document  
ed intervention                           
  
  
1 goal   
intervention   
scheduled/document  
ed in this visit  
   
                                                      
  
  
Medication   
Education  
  
  
Disciplines:  
  
  
Skilled Services                                      
  
  
2022                                  
  
  
Active                                    
  
  
1 goal linked to   
scheduled/document  
ed intervention                           
  
  
1 goal   
intervention   
scheduled/document  
ed in this visit  
   
                                                      
  
  
Sepsis  
  
  
Disciplines:  
  
  
Skilled Services                                      
  
  
2022                                  
  
  
Active                                    
  
  
1 goal linked to   
scheduled/document  
ed intervention                           
  
  
1 goal   
intervention   
scheduled/document  
ed in this visit  
   
                                                      
  
  
Physician   
Specific   
Parameters  
  
  
Disciplines:  
  
  
Skilled Services                                      
  
  
2022                                  
  
  
Active                                    
  
  
1 goal linked to   
scheduled/document  
ed intervention                           
  
  
1 goal   
intervention   
scheduled/document  
ed in this visit  
   
                                                      
  
  
Risk for Falls  
  
  
Disciplines:  
  
  
Skilled Services                                      
  
  
2022                                  
  
  
Active                                    
  
  
1 goal linked to   
scheduled/document  
ed intervention                           
  
  
1 goal   
intervention   
scheduled/document  
ed in this visit  
   
                                                      
  
  
Discharge  
  
  
Disciplines:  
  
  
Skilled Services                                      
  
  
2022                                  
  
  
Active                                    
  
  
1 goal linked to   
scheduled/document  
ed intervention                           
  
  
1 goal   
intervention   
scheduled/document  
ed in this visit  
  
  
                                      Goals  
  
                      Goal       Associated Problem Outcome    Goal Met?  Visit   
Notes  
   
                                                      
  
  
Improved Muscle Performance   
and/or ROM  
  
  
Description:  
  
  
LTG: Patient will demonstrate   
improved muscle performance   
to meet functional goals as   
evidenced by ability to   
tolerate 5 sit to stands in   
30 sec and tolerate 10 min of   
a standing activity, to be   
achieved by 10/1/22. .  
  
  
  
  
  
STG: Patient and/or caregiver   
will verbalize/demonstrate   
independence with home   
exercise program, to improve   
functional mobility, to be   
achieved by 9/10/22.  
  
  
  
  
  
LTG: Patient will demonstrate   
improved left hip active   
range of motion to 100* in   
standing degrees, to meet   
functional goals, to be   
achieved by 10/1/22. .                    
  
  
PT Impaired muscle   
performance and/or ROM                    
  
                                          
  
  
No                                        
   
                                                      
  
  
Improved Gait  
  
  
Description:  
  
  
  
  
  
LTG: Patient will demonstrate   
improved gait ability as   
evidenced by ambulation 150   
feet with rollator walker   
independently with AD, to   
return to safe household and   
community ambulation, in   
order to return to plf , to   
be achieved by 10/1/22. .  
  
  
  
  
                                          
  
  
PT Impaired gait                          
  
                                          
  
  
No                                        
   
                                                      
  
  
Improved Balance  
  
  
Description:  
  
  
  
  
  
LTG: Patient will demonstrate   
improved standing balance to   
meet functional goals as   
evidenced by TUG score of <28   
to be achieved by 10/1/22. .              
  
  
PT Impaired balance                       
  
                                          
  
  
No                                        
   
                                                      
  
  
Demonstrate understanding of   
education  
  
  
Description:  
  
  
Patient and/or caregiver will   
understand educational   
instruction to be achieved by   
10/1/22. .                                
  
  
PT Learning Assessment                    
  
                                          
  
  
No                                        
   
                                                      
  
  
Patient/caregiver will   
demonstrate ability to   
obtain, store, identify and   
administer ordered   
medications, keep accurate   
medication list in home, and   
adhere to medication schedule  
  
  
Description:  
  
  
Patient/caregiver will   
demonstrate ability to   
obtain, store, identify and   
administer ordered   
medications, keep accurate   
medication list in home, and   
adhere to medication schedule   
by 10/1/22.  
  
                                          
  
  
Medication Education                      
  
                                          
  
  
No                                        
   
                                                      
  
  
Patient/caregiver will be   
able to identify and report   
symptoms of sepsis  
  
  
Description:  
  
  
Patient/caregiver will be   
able to identify   
signs/symptoms of sepsis   
infection and will verbalize   
actions to take if suspected   
by 10/1/22. .                             
  
  
Sepsis                                    
  
                                          
  
  
No                                        
   
                                                      
  
  
Patient to maintain   
parameters within   
physician-specified ranges   
throughout certification   
period                                    
  
  
Physician Specific   
Parameters                                
  
                                          
  
  
No                                        
   
                                                      
  
  
Manage Risk for falls  
  
  
Description:  
  
  
Patient/caregiver will   
verbalize knowledge of   
individualized fall   
prevention strategies by   
10/1/22. .                                
  
  
Risk for Falls                            
  
                                          
  
  
No                                        
   
                                                      
  
  
Manage discharge planning  
  
  
Description:  
  
  
Patient/caregiver will   
verbalize understanding of   
ongoing discharge plan   
provided related to disease   
management, arrangements for   
outpatient and/or community   
services, obtaining   
medications, supplies, and   
DME, as needed throughout   
certification period.                     
  
  
Discharge                                 
  
                                          
  
  
No                                        
  
  
                                  Interventions  
  
                                        Intervention        Associated   
Problem/Goal        Status              Variance            Visit Notes  
   
                                                      
  
  
Physical Therapy   
Therapeutic Exercises                   Problem:PT Impaired   
muscle performance   
and/or ROM  
Goal:Improved Muscle   
Performance and/or   
ROM                                       
  
  
Completed                                 
  
                                          
  
  
patient instructed on   
strengthening   
exercises including   
seated: laq, hip   
flexion and adduction   
and heel toe raies   
x's 10 each. standing   
: hamstring curls and   
hip abduction x's 10   
each with verbal,   
visual and written   
cues for correct form   
and pace. patient   
instructed to perform   
home exercise program   
twice a day which   
included above   
exercises.  
   
                                                      
  
  
Physical Therapy Gait   
Training                                Problem:PT Impaired   
gait  
Goal:Improved Gait                        
  
  
Completed                                 
  
                                          
  
  
Gait training and   
instruction to   
patient on safe   
ambulation with   
rollator walker for   
2x's 80 feet with   
supervision, with   
verbal cues for   
corrections of gait   
deviations including   
pacing for safety and   
energy conservation.  
   
                                                      
  
  
Physical Therapy   
Balance Training                        Problem:PT Impaired   
balance  
Goal:Improved Balance                     
  
  
Completed                                 
  
                                          
  
  
Developed,   
implemented, and   
instructed patient on   
standing balance   
exercises including   
lateral stepping at   
counter 5feet x's 2   
w/ David UE support on   
counter .  
   
                                                      
  
  
Instruct and educate   
on knowledge deficits                   Problem:PT Learning   
Assessment  
Goal:Demonstrate   
understanding of   
education                                 
  
  
Completed                                 
  
                                          
  
  
patient verbalize   
and/or demonstrate   
understanding of   
physical therapy   
education including   
home exercise   
program.  
  
  
  
  
  
Education methods   
include: verbal cues,   
written instructions   
and visual cues.  
  
  
  
  
  
Further education   
required to improve   
knowledge and   
compliance with home   
exercise program.  
   
                                                      
  
  
Medication Education  
  
  
Description:  
  
  
Evaluate/instruct   
patient/caregiver on   
obtaining, storing,   
identifying and   
administering ordered   
medications as well as   
keeping accurate   
medication list in the   
home and adhereing to   
medication schedule                     Problem:Medication   
Education  
Goal:Patient/caregive  
r will demonstrate   
ability to obtain,   
store, identify and   
administer ordered   
medications, keep   
accurate medication   
list in home, and   
adhere to medication   
schedule                                  
  
  
Completed                                 
  
                                          
  
  
Patient instructed on   
importance of keeping   
accurate medication   
list in home.  
   
                                                      
  
  
Risk of Sepsis  
  
  
Description:  
  
  
Patient is at risk for   
sepsis. Monitor   
closely for s/s of   
sepsis.                                 Problem:Sepsis  
Goal:Patient/caregive  
r will be able to   
identify and report   
symptoms of sepsis                        
  
  
Completed                                 
  
                                          
   
                                                      
  
  
SPO2  
  
  
Description:  
  
  
Notify Dr. Ahuja   
if pulse ox is <92% at   
rest.                                   Problem:Physician   
Specific Parameters  
Goal:Patient to   
maintain parameters   
within   
physician-specified   
ranges throughout   
certification period                      
  
  
Completed                                 
  
                                          
   
                                                      
  
  
Instruct on individual   
fall risk factors and   
strategies to prevent   
falls and injuries   
caused by falls.                        Problem:Risk for   
Falls  
Goal:Manage Risk for   
falls                                     
  
  
Completed                                 
  
                                          
  
  
PT: Patient   
instructed on   
Managing Impaired   
Functional Mobility:   
Use assistive   
device(s): rollator   
walker  
  
  
   
                                                      
  
  
Instruct on ongoing   
discharge plan                          Problem:Discharge  
Goal:Manage discharge   
planning                                  
  
  
Completed                                 
  
                                          
  
  
Ongoing Discharge   
plan: Discharge plan   
discussed with   
patient including   
frequency and   
duration for home PT   
and plan for   
transition to: live   
independently at home   
without ongoing   
services.  
  
  
  
documented in this encounter  
[facility]'s home Plan of care note* Visit Details  
  
                                                    Visit Type -PT ROUTINE  
Discipline -Physical Therapy  
  
  
                                    Problems  
  
                    Problem   Description Start Date Status    Goals     Interve  
ntions  
   
                                                      
  
  
PT Impaired   
muscle   
performance   
and/or ROM  
  
  
Disciplines:  
  
  
PT                                                    
  
  
2022                                  
  
  
Active                                    
  
  
1 goal linked to   
scheduled/document  
ed intervention                           
  
  
1 goal   
intervention   
scheduled/document  
ed in this visit  
   
                                                      
  
  
PT Impaired   
mobility  
  
  
Disciplines:  
  
  
PT                                                    
  
  
2022                                  
  
  
Active                                    
  
  
1 goal linked to   
scheduled/document  
ed intervention                           
  
  
1 goal   
intervention   
scheduled/document  
ed in this visit  
   
                                                      
  
  
PT Impaired gait  
  
  
Disciplines:  
  
  
PT                                                    
  
  
2022                                  
  
  
Active                                    
  
  
1 goal linked to   
scheduled/document  
ed intervention                           
  
  
1 goal   
intervention   
scheduled/document  
ed in this visit  
   
                                                      
  
  
PT Learning   
Assessment  
  
  
Disciplines:  
  
  
PT                                                    
  
  
2022                                  
  
  
Active                                    
  
  
1 goal linked to   
scheduled/document  
ed intervention                           
  
  
1 goal   
intervention   
scheduled/document  
ed in this visit  
   
                                                      
  
  
Medication   
Education  
  
  
Disciplines:  
  
  
Skilled Services                                      
  
  
2022                                  
  
  
Active                                    
  
  
1 goal linked to   
scheduled/document  
ed intervention                           
  
  
1 goal   
intervention   
scheduled/document  
ed in this visit  
   
                                                      
  
  
Sepsis  
  
  
Disciplines:  
  
  
Skilled Services                                      
  
  
2022                                  
  
  
Active                                    
  
  
1 goal linked to   
scheduled/document  
ed intervention                           
  
  
1 goal   
intervention   
scheduled/document  
ed in this visit  
   
                                                      
  
  
Physician   
Specific   
Parameters  
  
  
Disciplines:  
  
  
Skilled Services                                      
  
  
2022                                  
  
  
Active                                    
  
  
1 goal linked to   
scheduled/document  
ed intervention                           
  
  
1 goal   
intervention   
scheduled/document  
ed in this visit  
   
                                                      
  
  
Risk for Falls  
  
  
Disciplines:  
  
  
Skilled Services                                      
  
  
2022                                  
  
  
Active                                    
  
  
1 goal linked to   
scheduled/document  
ed intervention                           
  
  
1 goal   
intervention   
scheduled/document  
ed in this visit  
   
                                                      
  
  
Pain  
  
  
Disciplines:  
  
  
Skilled Services                                      
  
  
2022                                  
  
  
Active                                    
  
  
1 goal linked to   
scheduled/document  
ed intervention                           
  
  
1 goal   
intervention   
scheduled/document  
ed in this visit  
  
  
                                      Goals  
  
                      Goal       Associated Problem Outcome    Goal Met?  Visit   
Notes  
   
                                                      
  
  
Improved Muscle Performance   
and/or ROM  
  
  
Description:  
  
  
LTG: Patient will demonstrate   
improved muscle performance   
to meet functional goals as   
evidenced by ability to   
tolerate 5 sit to stands in   
30 sec and tolerate 10 min of   
a standing activity, to be   
achieved by 10/1/22. .  
  
  
  
  
  
STG: Patient and/or caregiver   
will verbalize/demonstrate   
independence with home   
exercise program, to improve   
functional mobility, to be   
achieved by 9/10/22.  
  
  
  
  
  
LTG: Patient will demonstrate   
improved left hip active   
range of motion to 100* in   
standing degrees, to meet   
functional goals, to be   
achieved by 10/1/22. .                    
  
  
PT Impaired muscle   
performance and/or ROM                    
  
                                          
  
  
No                                        
   
                                                      
  
  
Improved Transfers  
  
  
Description:  
  
  
  
  
  
  
  
  
LTG: Patient will demonstrate   
safe transfers to/from bed,   
chair, toilet, shower/tub and   
car independently with AD, to   
be achieved by 10/1/22. .  
  
                                          
  
  
PT Impaired mobility                      
  
                                          
  
  
No                                        
   
                                                      
  
  
Improved Gait  
  
  
Description:  
  
  
  
  
  
LTG: Patient will demonstrate   
improved gait ability as   
evidenced by ambulation 150   
feet with rollator walker   
independently with AD, to   
return to safe household and   
community ambulation, in   
order to return to plf , to   
be achieved by 10/1/22. .  
  
  
  
  
                                          
  
  
PT Impaired gait                          
  
                                          
  
  
No                                        
   
                                                      
  
  
Demonstrate understanding of   
education  
  
  
Description:  
  
  
Patient and/or caregiver will   
understand educational   
instruction to be achieved by   
10/1/22. .                                
  
  
PT Learning Assessment                    
  
                                          
  
  
No                                        
   
                                                      
  
  
Patient/caregiver will   
demonstrate ability to   
obtain, store, identify and   
administer ordered   
medications, keep accurate   
medication list in home, and   
adhere to medication schedule  
  
  
Description:  
  
  
Patient/caregiver will   
demonstrate ability to   
obtain, store, identify and   
administer ordered   
medications, keep accurate   
medication list in home, and   
adhere to medication schedule   
by 10/1/22.  
  
                                          
  
  
Medication Education                      
  
                                          
  
  
No                                        
   
                                                      
  
  
Patient/caregiver will be   
able to identify and report   
symptoms of sepsis  
  
  
Description:  
  
  
Patient/caregiver will be   
able to identify   
signs/symptoms of sepsis   
infection and will verbalize   
actions to take if suspected   
by 10/1/22. .                             
  
  
Sepsis                                    
  
                                          
  
  
No                                        
   
                                                      
  
  
Patient to maintain   
parameters within   
physician-specified ranges   
throughout certification   
period                                    
  
  
Physician Specific   
Parameters                                
  
                                          
  
  
No                                        
   
                                                      
  
  
Manage Risk for falls  
  
  
Description:  
  
  
Patient/caregiver will   
verbalize knowledge of   
individualized fall   
prevention strategies by   
10/1/22. .                                
  
  
Risk for Falls                            
  
                                          
  
  
No                                        
   
                                                      
  
  
Manage Pain  
  
  
Description:  
  
  
Patient/caregiver will   
verbalize knowledge and   
understanding of appropriate   
techniques to control pain,   
including pain medication and   
non-pharmacological   
techniques. Patient will   
verbalize or demonstrate an   
acceptable level of pain as   
evidenced by a pain score of   
<5/10 and improvement in   
ability to perform activities   
of daily living to be   
achieved by 10/1/22. .                    
  
  
Pain                                      
  
                                          
  
  
No                                        
  
  
                                  Interventions  
  
                                        Intervention        Associated   
Problem/Goal        Status              Variance            Visit Notes  
   
                                                      
  
  
Physical Therapy   
Therapeutic Exercises                   Problem:PT Impaired   
muscle performance   
and/or ROM  
Goal:Improved Muscle   
Performance and/or   
ROM                                       
  
  
Completed                                 
  
                                          
  
  
patient instructed on   
strengthening   
exercises including   
Supine : GS,QS,HS,   
HIPADD, HIP BD, HEEL   
SLIDES, SAQ,BRIDGING X   
15  
  
  
SEATED: FAQ, HIP FLEX,   
,HIP ADD, HIP ABD AP   
X10 with blue T band  
  
  
with verbal and   
tactile cues for   
TECHNIQUE .  
  
  
   
                                                      
  
  
Physical Therapy   
Transfer Training                       Problem:PT Impaired   
mobility  
Goal:Improved   
Transfers                                 
  
  
Completed                                 
  
                                          
  
  
Transfer training and   
instruction to patient   
on safe transfers to   
and from bed and chair   
with supervision and   
verbal cues for   
technique  
   
                                                      
  
  
Physical Therapy Gait   
Training                                Problem:PT Impaired   
gait  
Goal:Improved Gait                        
  
  
Completed                                 
  
                                          
  
  
Gait training and   
instruction to patient   
on safe ambulation   
with front wheeled   
walker for 40' x 3   
feet with supervision,   
with verbal cues for   
corrections of gait   
deviations including   
picking up his feet   
and no shuffling .  
   
                                                      
  
  
Instruct and educate   
on knowledge deficits                   Problem:PT Learning   
Assessment  
Goal:Demonstrate   
understanding of   
education                                 
  
  
Completed                                 
  
                                          
  
  
patient verbalize   
and/or demonstrate   
understanding of   
physical therapy   
education including   
cardiac disease   
management, pulmonary   
disease management,   
pain management, fall   
prevention strategies,   
home safety and   
functional activity.  
  
  
  
  
  
Education methods   
include: verbal cues.  
  
  
  
  
  
Further education   
required to improve   
knowledge and   
compliance with fall   
prevention strategies,   
home safety,   
functional activity   
and home exercise   
program.  
   
                                                      
  
  
Medication Education  
  
  
Description:  
  
  
Evaluate/instruct   
patient/caregiver on   
obtaining, storing,   
identifying and   
administering ordered   
medications as well   
as keeping accurate   
medication list in   
the home and   
adhereing to   
medication schedule                     Problem:Medication   
Education  
Goal:Patient/caregive  
r will demonstrate   
ability to obtain,   
store, identify and   
administer ordered   
medications, keep   
accurate medication   
list in home, and   
adhere to medication   
schedule                                  
  
  
Completed                                 
  
                                          
  
  
Patient instructed on   
importance of keeping   
accurate medication   
list in home and   
adhering to medication   
schedule.  
   
                                                      
  
  
Risk of Sepsis  
  
  
Description:  
  
  
Patient is at risk   
for sepsis. Monitor   
closely for s/s of   
sepsis.                                 Problem:Sepsis  
Goal:Patient/caregive  
r will be able to   
identify and report   
symptoms of sepsis                        
  
  
Completed                                 
  
                                          
   
                                                      
  
  
SPO2  
  
  
Description:  
  
  
Notify Dr. Ahuja   
if pulse ox is <92%   
at rest.                                Problem:Physician   
Specific Parameters  
Goal:Patient to   
maintain parameters   
within   
physician-specified   
ranges throughout   
certification period                      
  
  
Completed                                 
  
                                          
   
                                                      
  
  
Instruct on   
individual fall risk   
factors and   
strategies to prevent   
falls and injuries   
caused by falls.                        Problem:Risk for   
Falls  
Goal:Manage Risk for   
falls                                     
  
  
Completed                                 
  
                                          
  
  
PT: Patient instructed   
on  
  
  
Eliminating   
Environmental Hazards:   
Keep pathways clear,   
Keep pets out of   
pathways, Remove   
unsafe rugs and Move   
furniture from   
pathways  
  
  
Managing Impaired   
Functional Mobility:   
Use assistive   
device(s): front   
wheeled walker  
  
  
Managing Pain  
  
  
  
  
  
   
                                                      
  
  
Instruct on pain and   
instruct on   
strategies to control   
pain                                    Problem:Pain  
Goal:Manage Pain                          
  
  
Completed                                 
  
                                          
  
  
patient instructed on   
techniques to control   
pain including   
Pharmacological   
measures and   
Non-Pharmacological   
measures; rest and   
positioning/elevation.  
  
documented in this encounter  
[facility]'s home Plan of care note* Visit Details  
  
                                                    Visit Type -PTA ROUTINE  
Discipline -Physical Therapy  
  
  
                                    Problems  
  
                    Problem   Description Start Date Status    Goals     Interve  
ntions  
   
                                                      
  
  
PT Impaired   
muscle   
performance   
and/or ROM  
  
  
Disciplines:  
  
  
PT                                                    
  
  
2022                                  
  
  
Active                                    
  
  
1 goal linked to   
scheduled/document  
ed intervention                           
  
  
1 goal   
intervention   
scheduled/document  
ed in this visit  
   
                                                      
  
  
PT Impaired   
mobility  
  
  
Disciplines:  
  
  
PT                                                    
  
  
2022                                  
  
  
Active                                    
  
  
1 goal linked to   
scheduled/document  
ed intervention                           
  
  
1 goal   
intervention   
scheduled/document  
ed in this visit  
   
                                                      
  
  
PT Impaired gait  
  
  
Disciplines:  
  
  
PT                                                    
  
  
2022                                  
  
  
Active                                    
  
  
1 goal linked to   
scheduled/document  
ed intervention                           
  
  
1 goal   
intervention   
scheduled/document  
ed in this visit  
   
                                                      
  
  
PT Impaired   
balance  
  
  
Disciplines:  
  
  
PT                                                    
  
  
2022                                  
  
  
Active                                    
  
  
1 goal linked to   
scheduled/document  
ed intervention                           
  
  
1 goal   
intervention   
scheduled/document  
ed in this visit  
   
                                                      
  
  
PT Learning   
Assessment  
  
  
Disciplines:  
  
  
PT                                                    
  
  
2022                                  
  
  
Active                                    
  
  
1 goal linked to   
scheduled/document  
ed intervention                           
  
  
1 goal   
intervention   
scheduled/document  
ed in this visit  
   
                                                      
  
  
Medication   
Education  
  
  
Disciplines:  
  
  
Skilled Services                                      
  
  
2022                                  
  
  
Active                                    
  
  
1 goal linked to   
scheduled/document  
ed intervention                           
  
  
1 goal   
intervention   
scheduled/document  
ed in this visit  
   
                                                      
  
  
Sepsis  
  
  
Disciplines:  
  
  
Skilled Services                                      
  
  
2022                                  
  
  
Active                                    
  
  
1 goal linked to   
scheduled/document  
ed intervention                           
  
  
1 goal   
intervention   
scheduled/document  
ed in this visit  
   
                                                      
  
  
Physician   
Specific   
Parameters  
  
  
Disciplines:  
  
  
Skilled Services                                      
  
  
2022                                  
  
  
Active                                    
  
  
1 goal linked to   
scheduled/document  
ed intervention                           
  
  
1 goal   
intervention   
scheduled/document  
ed in this visit  
   
                                                      
  
  
Risk for Falls  
  
  
Disciplines:  
  
  
Skilled Services                                      
  
  
2022                                  
  
  
Active                                    
  
  
1 goal linked to   
scheduled/document  
ed intervention                           
  
  
1 goal   
intervention   
scheduled/document  
ed in this visit  
   
                                                      
  
  
Pain  
  
  
Disciplines:  
  
  
Skilled Services                                      
  
  
2022                                  
  
  
Active                                    
  
  
1 goal linked to   
scheduled/document  
ed intervention                           
  
  
1 goal   
intervention   
scheduled/document  
ed in this visit  
   
                                                      
  
  
Discharge  
  
  
Disciplines:  
  
  
Skilled Services                                      
  
  
2022                                  
  
  
Active                                    
  
  
1 goal linked to   
scheduled/document  
ed intervention                           
  
  
1 goal   
intervention   
scheduled/document  
ed in this visit  
  
  
                                      Goals  
  
                      Goal       Associated Problem Outcome    Goal Met?  Visit   
Notes  
   
                                                      
  
  
Improved Muscle Performance   
and/or ROM  
  
  
Description:  
  
  
LTG: Patient will demonstrate   
improved muscle performance   
to meet functional goals as   
evidenced by ability to   
tolerate 5 sit to stands in   
30 sec and tolerate 10 min of   
a standing activity, to be   
achieved by 10/1/22. .  
  
  
  
  
  
STG: Patient and/or caregiver   
will verbalize/demonstrate   
independence with home   
exercise program, to improve   
functional mobility, to be   
achieved by 9/10/22.  
  
  
  
  
  
LTG: Patient will demonstrate   
improved left hip active   
range of motion to 100* in   
standing degrees, to meet   
functional goals, to be   
achieved by 10/1/22. .                    
  
  
PT Impaired muscle   
performance and/or ROM                    
  
                                          
  
  
No                                        
   
                                                      
  
  
Improved Transfers  
  
  
Description:  
  
  
  
  
  
  
  
  
LTG: Patient will demonstrate   
safe transfers to/from bed,   
chair, toilet, shower/tub and   
car independently with AD, to   
be achieved by 10/1/22. .  
  
                                          
  
  
PT Impaired mobility                      
  
                                          
  
  
No                                        
   
                                                      
  
  
Improved Gait  
  
  
Description:  
  
  
  
  
  
LTG: Patient will demonstrate   
improved gait ability as   
evidenced by ambulation 150   
feet with rollator walker   
independently with AD, to   
return to safe household and   
community ambulation, in   
order to return to plf , to   
be achieved by 10/1/22. .  
  
  
  
  
                                          
  
  
PT Impaired gait                          
  
                                          
  
  
No                                        
   
                                                      
  
  
Improved Balance  
  
  
Description:  
  
  
  
  
  
LTG: Patient will demonstrate   
improved standing balance to   
meet functional goals as   
evidenced by TUG score of <28   
to be achieved by 10/1/22. .              
  
  
PT Impaired balance                       
  
                                          
  
  
No                                        
   
                                                      
  
  
Demonstrate understanding of   
education  
  
  
Description:  
  
  
Patient and/or caregiver will   
understand educational   
instruction to be achieved by   
10/1/22. .                                
  
  
PT Learning Assessment                    
  
                                          
  
  
No                                        
   
                                                      
  
  
Patient/caregiver will   
demonstrate ability to   
obtain, store, identify and   
administer ordered   
medications, keep accurate   
medication list in home, and   
adhere to medication schedule  
  
  
Description:  
  
  
Patient/caregiver will   
demonstrate ability to   
obtain, store, identify and   
administer ordered   
medications, keep accurate   
medication list in home, and   
adhere to medication schedule   
by 10/1/22.  
  
                                          
  
  
Medication Education                      
  
                                          
  
  
No                                        
   
                                                      
  
  
Patient/caregiver will be   
able to identify and report   
symptoms of sepsis  
  
  
Description:  
  
  
Patient/caregiver will be   
able to identify   
signs/symptoms of sepsis   
infection and will verbalize   
actions to take if suspected   
by 10/1/22. .                             
  
  
Sepsis                                    
  
                                          
  
  
No                                        
   
                                                      
  
  
Patient to maintain   
parameters within   
physician-specified ranges   
throughout certification   
period                                    
  
  
Physician Specific   
Parameters                                
  
                                          
  
  
No                                        
   
                                                      
  
  
Manage Risk for falls  
  
  
Description:  
  
  
Patient/caregiver will   
verbalize knowledge of   
individualized fall   
prevention strategies by   
10/1/22. .                                
  
  
Risk for Falls                            
  
                                          
  
  
No                                        
   
                                                      
  
  
Manage Pain  
  
  
Description:  
  
  
Patient/caregiver will   
verbalize knowledge and   
understanding of appropriate   
techniques to control pain,   
including pain medication and   
non-pharmacological   
techniques. Patient will   
verbalize or demonstrate an   
acceptable level of pain as   
evidenced by a pain score of   
<5/10 and improvement in   
ability to perform activities   
of daily living to be   
achieved by 10/1/22. .                    
  
  
Pain                                      
  
                                          
  
  
No                                        
   
                                                      
  
  
Manage discharge planning  
  
  
Description:  
  
  
Patient/caregiver will   
verbalize understanding of   
ongoing discharge plan   
provided related to disease   
management, arrangements for   
outpatient and/or community   
services, obtaining   
medications, supplies, and   
DME, as needed throughout   
certification period.                     
  
  
Discharge                                 
  
                                          
  
  
No                                        
  
  
                                  Interventions  
  
                                        Intervention        Associated   
Problem/Goal        Status              Variance            Visit Notes  
   
                                                      
  
  
Physical Therapy   
Therapeutic Exercises                   Problem:PT Impaired   
muscle performance   
and/or ROM  
Goal:Improved Muscle   
Performance and/or   
ROM                                       
  
  
Completed                                 
  
                                          
  
  
patient instructed on   
strengthening   
exercises including   
seated laq, hip   
flexion and   
adduction, heel toe   
raises x's 10 each   
with verbal and   
visual cues for   
correct form. patient   
instructed to perform   
home exercise program   
daily which included   
above exercises.  
   
                                                      
  
  
Physical Therapy   
Transfer Training                       Problem:PT Impaired   
mobility  
Goal:Improved   
Transfers                                 
  
  
Completed                                 
  
                                          
  
  
Transfer training and   
instruction to   
patient on safe   
transfers to and from   
chair and chair lift   
with supervision and   
verbal cues for   
safety.  
   
                                                      
  
  
Physical Therapy Gait   
Training                                Problem:PT Impaired   
gait  
Goal:Improved Gait                        
  
  
Completed                                 
  
                                          
  
  
Gait training and   
instruction to   
patient on safe   
ambulation with   
rollator walker for   
2x's125 feet with   
stand by assist, with   
verbal cues for   
corrections of gait   
deviations including   
inceased step height   
and for staying   
closer to rollator. .  
   
                                                      
  
  
Physical Therapy   
Balance Training                        Problem:PT Impaired   
balance  
Goal:Improved Balance                     
  
  
Completed                                 
  
                                          
  
  
Developed,   
implemented, and   
instructed patient on   
standing balance   
exercises including .  
   
                                                      
  
  
Instruct and educate   
on knowledge deficits                   Problem:PT Learning   
Assessment  
Goal:Demonstrate   
understanding of   
education                                 
  
  
Completed                                 
  
                                          
  
  
patient verbalize   
and/or demonstrate   
understanding of   
physical therapy   
education including   
home exercise   
program.  
  
  
  
  
  
Education methods   
include: verbal cues.  
  
  
  
  
  
Further education   
required to improve   
knowledge and   
compliance with home   
exercise program.  
   
                                                      
  
  
Medication Education  
  
  
Description:  
  
  
Evaluate/instruct   
patient/caregiver on   
obtaining, storing,   
identifying and   
administering ordered   
medications as well as   
keeping accurate   
medication list in the   
home and adhereing to   
medication schedule                     Problem:Medication   
Education  
Goal:Patient/caregive  
r will demonstrate   
ability to obtain,   
store, identify and   
administer ordered   
medications, keep   
accurate medication   
list in home, and   
adhere to medication   
schedule                                  
  
  
Completed                                 
  
                                          
  
  
Patient instructed on   
importance of keeping   
accurate medication   
list in home.  
   
                                                      
  
  
Risk of Sepsis  
  
  
Description:  
  
  
Patient is at risk for   
sepsis. Monitor   
closely for s/s of   
sepsis.                                 Problem:Sepsis  
Goal:Patient/caregive  
r will be able to   
identify and report   
symptoms of sepsis                        
  
  
Completed                                 
  
                                          
   
                                                      
  
  
SPO2  
  
  
Description:  
  
  
Notify Dr. Ahuja   
if pulse ox is <92% at   
rest.                                   Problem:Physician   
Specific Parameters  
Goal:Patient to   
maintain parameters   
within   
physician-specified   
ranges throughout   
certification period                      
  
  
Completed                                 
  
                                          
   
                                                      
  
  
Instruct on individual   
fall risk factors and   
strategies to prevent   
falls and injuries   
caused by falls.                        Problem:Risk for   
Falls  
Goal:Manage Risk for   
falls                                     
  
  
Completed                                 
  
                                          
  
  
PT: Patient   
instructed on   
Managing Impaired   
Functional Mobility:   
Use assistive   
device(s): front   
wheeled walker  
  
  
   
                                                      
  
  
Instruct on pain and   
instruct on strategies   
to control pain                         Problem:Pain  
Goal:Manage Pain                          
  
  
Completed                                 
  
                                          
  
  
patient instructed on   
techniques to control   
pain including   
Non-Pharmacological   
measures;   
breathing/relaxation.  
   
                                                      
  
  
Instruct on ongoing   
discharge plan                          Problem:Discharge  
Goal:Manage discharge   
planning                                  
  
  
Completed                                 
  
                                          
  
  
Ongoing Discharge   
plan: Discharge plan   
discussed with   
patient including   
frequency and   
duration for home PT   
and plan for   
transition to: live   
independently at home   
without ongoing   
services.  
  
  
  
documented in this encounter  
[facility] Details  
  
                                                    Visit Type -PT ROUTINE  
Discipline -Physical Therapy  
  
  
                                    Problems  
  
                    Problem   Description Start Date Status    Goals     Interve  
ntions  
   
                                                      
  
  
PT Impaired   
muscle   
performance   
and/or ROM  
  
  
Disciplines:  
  
  
PT                                                    
  
  
2022                                  
  
  
Active                                    
  
  
1 goal linked to   
scheduled/document  
ed intervention                           
  
  
1 goal   
intervention   
scheduled/document  
ed in this visit  
   
                                                      
  
  
PT Impaired   
mobility  
  
  
Disciplines:  
  
  
PT                                                    
  
  
2022                                  
  
  
Active                                    
  
  
1 goal linked to   
scheduled/document  
ed intervention                           
  
  
1 goal   
intervention   
scheduled/document  
ed in this visit  
   
                                                      
  
  
PT Impaired gait  
  
  
Disciplines:  
  
  
PT                                                    
  
  
2022                                  
  
  
Active                                    
  
  
1 goal linked to   
scheduled/document  
ed intervention                           
  
  
1 goal   
intervention   
scheduled/document  
ed in this visit  
   
                                                      
  
  
PT Impaired   
balance  
  
  
Disciplines:  
  
  
PT                                                    
  
  
2022                                  
  
  
Active                                    
  
  
1 goal linked to   
scheduled/document  
ed intervention                           
  
  
1 goal   
intervention   
scheduled/document  
ed in this visit  
   
                                                      
  
  
PT Learning   
Assessment  
  
  
Disciplines:  
  
  
PT                                                    
  
  
2022                                  
  
  
Active                                    
  
  
1 goal linked to   
scheduled/document  
ed intervention                           
  
  
1 goal   
intervention   
scheduled/document  
ed in this visit  
   
                                                      
  
  
Medication   
Education  
  
  
Disciplines:  
  
  
Skilled Services                                      
  
  
2022                                  
  
  
Active                                    
  
  
1 goal linked to   
scheduled/document  
ed intervention                           
  
  
1 goal   
intervention   
scheduled/document  
ed in this visit  
   
                                                      
  
  
Sepsis  
  
  
Disciplines:  
  
  
Skilled Services                                      
  
  
2022                                  
  
  
Active                                    
  
  
1 goal linked to   
scheduled/document  
ed intervention                           
  
  
1 goal   
intervention   
scheduled/document  
ed in this visit  
   
                                                      
  
  
Physician   
Specific   
Parameters  
  
  
Disciplines:  
  
  
Skilled Services                                      
  
  
2022                                  
  
  
Active                                    
  
  
1 goal linked to   
scheduled/document  
ed intervention                           
  
  
1 goal   
intervention   
scheduled/document  
ed in this visit  
   
                                                      
  
  
Risk for Falls  
  
  
Disciplines:  
  
  
Skilled Services                                      
  
  
2022                                  
  
  
Active                                    
  
  
1 goal linked to   
scheduled/document  
ed intervention                           
  
  
1 goal   
intervention   
scheduled/document  
ed in this visit  
  
  
                                      Goals  
  
                      Goal       Associated Problem Outcome    Goal Met?  Visit   
Notes  
   
                                                      
  
  
Improved Muscle Performance   
and/or ROM  
  
  
Description:  
  
  
LTG: Patient will demonstrate   
improved muscle performance   
to meet functional goals as   
evidenced by ability to   
tolerate 5 sit to stands in   
30 sec and tolerate 10 min of   
a standing activity, to be   
achieved by 10/1/22. .  
  
  
  
  
  
STG: Patient and/or caregiver   
will verbalize/demonstrate   
independence with home   
exercise program, to improve   
functional mobility, to be   
achieved by 9/10/22.  
  
  
  
  
  
LTG: Patient will demonstrate   
improved left hip active   
range of motion to 100* in   
standing degrees, to meet   
functional goals, to be   
achieved by 10/1/22. .                    
  
  
PT Impaired muscle   
performance and/or ROM                    
  
                                          
  
  
No                                        
   
                                                      
  
  
Improved Transfers  
  
  
Description:  
  
  
  
  
  
  
  
  
LTG: Patient will demonstrate   
safe transfers to/from bed,   
chair, toilet, shower/tub and   
car independently with AD, to   
be achieved by 10/1/22. .  
  
                                          
  
  
PT Impaired mobility                      
  
                                          
  
  
No                                        
   
                                                      
  
  
Improved Gait  
  
  
Description:  
  
  
  
  
  
LTG: Patient will demonstrate   
improved gait ability as   
evidenced by ambulation 150   
feet with rollator walker   
independently with AD, to   
return to safe household and   
community ambulation, in   
order to return to plf , to   
be achieved by 10/1/22. .  
  
  
  
  
                                          
  
  
PT Impaired gait                          
  
                                          
  
  
No                                        
   
                                                      
  
  
Improved Balance  
  
  
Description:  
  
  
  
  
  
LTG: Patient will demonstrate   
improved standing balance to   
meet functional goals as   
evidenced by TUG score of <28   
to be achieved by 10/1/22. .              
  
  
PT Impaired balance                       
  
                                          
  
  
No                                        
   
                                                      
  
  
Demonstrate understanding of   
education  
  
  
Description:  
  
  
Patient and/or caregiver will   
understand educational   
instruction to be achieved by   
10/1/22. .                                
  
  
PT Learning Assessment                    
  
                                          
  
  
No                                        
   
                                                      
  
  
Patient/caregiver will   
demonstrate ability to   
obtain, store, identify and   
administer ordered   
medications, keep accurate   
medication list in home, and   
adhere to medication schedule  
  
  
Description:  
  
  
Patient/caregiver will   
demonstrate ability to   
obtain, store, identify and   
administer ordered   
medications, keep accurate   
medication list in home, and   
adhere to medication schedule   
by 10/1/22.  
  
                                          
  
  
Medication Education                      
  
                                          
  
  
No                                        
   
                                                      
  
  
Patient/caregiver will be   
able to identify and report   
symptoms of sepsis  
  
  
Description:  
  
  
Patient/caregiver will be   
able to identify   
signs/symptoms of sepsis   
infection and will verbalize   
actions to take if suspected   
by 10/1/22. .                             
  
  
Sepsis                                    
  
                                          
  
  
No                                        
   
                                                      
  
  
Patient to maintain   
parameters within   
physician-specified ranges   
throughout certification   
period                                    
  
  
Physician Specific   
Parameters                                
  
                                          
  
  
No                                        
   
                                                      
  
  
Manage Risk for falls  
  
  
Description:  
  
  
Patient/caregiver will   
verbalize knowledge of   
individualized fall   
prevention strategies by   
10/1/22. .                                
  
  
Risk for Falls                            
  
                                          
  
  
No                                        
  
  
                                  Interventions  
  
                                        Intervention        Associated   
Problem/Goal        Status              Variance            Visit Notes  
   
                                                      
  
  
Physical Therapy   
Therapeutic Exercises                   Problem:PT Impaired   
muscle performance   
and/or ROM  
Goal:Improved Muscle   
Performance and/or   
ROM                                       
  
  
Completed                                 
  
                                          
  
  
patient instructed on   
strengthening   
exercises including   
Supine : GS,QS,HS,   
HIPADD, HIP BD, HEEL   
SLIDES, SAQ,BRIDGING   
X 15  
  
  
SEATED: FAQ, HIP   
FLEX, ,HIP ADD, HIP   
ABD AP X10 with blue   
T band  
  
  
Standing: heel raises   
( unable to do) , Toe   
raises, knee flexion,   
hip flexionX 10 reps  
  
  
with verbal and   
tactile cues for   
TECHNIQUE .  
  
  
  
  
  
noticeably stronger   
with the supine ther   
ex, seated ther ex   
difficult with t   
band, struglled   
greatly with standing   
ther ex, Unable to   
lift heels off floor   
with heel raises  
   
                                                      
  
  
Physical Therapy   
Transfer Training                       Problem:PT Impaired   
mobility  
Goal:Improved   
Transfers                                 
  
  
Completed                                 
  
                                          
  
  
Transfer training and   
instruction to   
patient on safe   
transfers to and from   
bed and chair with   
supervision and   
verbal cues for cues.  
   
                                                      
  
  
Physical Therapy Gait   
Training                                Problem:PT Impaired   
gait  
Goal:Improved Gait                        
  
  
Completed                                 
  
                                          
  
  
Gait training and   
instruction to   
patient on safe   
ambulation with front   
wheeled walker for   
50' x 2 feet with   
stand by assist, with   
tactile cues for   
corrections of gait   
deviations including   
picking up feet,   
cannot toe off due to   
calf weakness .  
   
                                                      
  
  
Physical Therapy   
Balance Training                        Problem:PT Impaired   
balance  
Goal:Improved Balance                     
  
  
Completed                                 
  
                                          
  
  
Developed,   
implemented, and   
instructed patient on   
standing balance   
exercises including   
standing ther ex.  
   
                                                      
  
  
Instruct and educate   
on knowledge deficits                   Problem:PT Learning   
Assessment  
Goal:Demonstrate   
understanding of   
education                                 
  
  
Completed                                 
  
                                          
  
  
patient verbalize   
and/or demonstrate   
understanding of   
physical therapy   
education including   
functional activity   
and home exercise   
program.  
  
  
  
  
  
Education methods   
include: verbal cues.  
  
  
  
  
  
Further education   
required to improve   
knowledge and   
compliance with fall   
prevention   
strategies, home   
safety, functional   
activity and home   
exercise program.  
   
                                                      
  
  
Medication Education  
  
  
Description:  
  
  
Evaluate/instruct   
patient/caregiver on   
obtaining, storing,   
identifying and   
administering ordered   
medications as well as   
keeping accurate   
medication list in the   
home and adhereing to   
medication schedule                     Problem:Medication   
Education  
Goal:Patient/caregive  
r will demonstrate   
ability to obtain,   
store, identify and   
administer ordered   
medications, keep   
accurate medication   
list in home, and   
adhere to medication   
schedule                                  
  
  
Completed                                 
  
                                          
  
  
Patient instructed on   
importance of keeping   
accurate medication   
list in home and   
adhering to   
medication schedule.  
   
                                                      
  
  
Risk of Sepsis  
  
  
Description:  
  
  
Patient is at risk for   
sepsis. Monitor   
closely for s/s of   
sepsis.                                 Problem:Sepsis  
Goal:Patient/caregive  
r will be able to   
identify and report   
symptoms of sepsis                        
  
  
Completed                                 
  
                                          
   
                                                      
  
  
SPO2  
  
  
Description:  
  
  
Notify Dr. Ahuja   
if pulse ox is <92% at   
rest.                                   Problem:Physician   
Specific Parameters  
Goal:Patient to   
maintain parameters   
within   
physician-specified   
ranges throughout   
certification period                      
  
  
Completed                                 
  
                                          
   
                                                      
  
  
Instruct on individual   
fall risk factors and   
strategies to prevent   
falls and injuries   
caused by falls.                        Problem:Risk for   
Falls  
Goal:Manage Risk for   
falls                                     
  
  
Completed                                 
  
                                          
  
  
PT: Patient   
instructed on  
  
  
Eliminating   
Environmental   
Hazards: Keep   
pathways clear, Keep   
pets out of pathways,   
Remove unsafe rugs,   
Move furniture from   
pathways and Wear   
supportive shoes or   
non-skid socks  
  
  
Managing Impaired   
Functional Mobility:   
Use assistive   
device(s): front   
wheeled walker  
  
  
Managing Pain  
  
  
  
  
  
  
documented in this encounter  
[facility]'s home Plan of care note* Visit Details  
  
                                                    Visit Type -PTA ROUTINE  
Discipline -Physical Therapy  
  
  
                                    Problems  
  
                    Problem   Description Start Date Status    Goals     Interve  
ntions  
   
                                                      
  
  
PT Impaired   
muscle   
performance   
and/or ROM  
  
  
Disciplines:  
  
  
PT                                                    
  
  
2022                                  
  
  
Active                                    
  
  
1 goal linked to   
scheduled/document  
ed intervention                           
  
  
1 goal   
intervention   
scheduled/document  
ed in this visit  
   
                                                      
  
  
PT Impaired gait  
  
  
Disciplines:  
  
  
PT                                                    
  
  
2022                                  
  
  
Active                                    
  
  
1 goal linked to   
scheduled/document  
ed intervention                           
  
  
1 goal   
intervention   
scheduled/document  
ed in this visit  
   
                                                      
  
  
PT Impaired   
balance  
  
  
Disciplines:  
  
  
PT                                                    
  
  
2022                                  
  
  
Active                                    
  
  
1 goal linked to   
scheduled/document  
ed intervention                           
  
  
1 goal   
intervention   
scheduled/document  
ed in this visit  
   
                                                      
  
  
PT Learning   
Assessment  
  
  
Disciplines:  
  
  
PT                                                    
  
  
2022                                  
  
  
Active                                    
  
  
1 goal linked to   
scheduled/document  
ed intervention                           
  
  
1 goal   
intervention   
scheduled/document  
ed in this visit  
   
                                                      
  
  
Medication   
Education  
  
  
Disciplines:  
  
  
Skilled Services                                      
  
  
2022                                  
  
  
Active                                    
  
  
1 goal linked to   
scheduled/document  
ed intervention                           
  
  
1 goal   
intervention   
scheduled/document  
ed in this visit  
   
                                                      
  
  
Sepsis  
  
  
Disciplines:  
  
  
Skilled Services                                      
  
  
2022                                  
  
  
Active                                    
  
  
1 goal linked to   
scheduled/document  
ed intervention                           
  
  
1 goal   
intervention   
scheduled/document  
ed in this visit  
   
                                                      
  
  
Physician   
Specific   
Parameters  
  
  
Disciplines:  
  
  
Skilled Services                                      
  
  
2022                                  
  
  
Active                                    
  
  
1 goal linked to   
scheduled/document  
ed intervention                           
  
  
1 goal   
intervention   
scheduled/document  
ed in this visit  
   
                                                      
  
  
Risk for Falls  
  
  
Disciplines:  
  
  
Skilled Services                                      
  
  
2022                                  
  
  
Active                                    
  
  
1 goal linked to   
scheduled/document  
ed intervention                           
  
  
1 goal   
intervention   
scheduled/document  
ed in this visit  
   
                                                      
  
  
Pain  
  
  
Disciplines:  
  
  
Skilled Services                                      
  
  
2022                                  
  
  
Active                                    
  
  
1 goal linked to   
scheduled/document  
ed intervention                           
  
  
1 goal   
intervention   
scheduled/document  
ed in this visit  
   
                                                      
  
  
Discharge  
  
  
Disciplines:  
  
  
Skilled Services                                      
  
  
2022                                  
  
  
Active                                    
  
  
1 goal linked to   
scheduled/document  
ed intervention                           
  
  
2 goal   
interventions   
scheduled/document  
ed in this visit  
  
  
                                      Goals  
  
                      Goal       Associated Problem Outcome    Goal Met?  Visit   
Notes  
   
                                                      
  
  
Improved Muscle Performance   
and/or ROM  
  
  
Description:  
  
  
LTG: Patient will demonstrate   
improved muscle performance   
to meet functional goals as   
evidenced by ability to   
tolerate 5 sit to stands in   
30 sec and tolerate 10 min of   
a standing activity, to be   
achieved by 10/1/22. .  
  
  
  
  
  
STG: Patient and/or caregiver   
will verbalize/demonstrate   
independence with home   
exercise program, to improve   
functional mobility, to be   
achieved by 9/10/22.  
  
  
  
  
  
LTG: Patient will demonstrate   
improved left hip active   
range of motion to 100* in   
standing degrees, to meet   
functional goals, to be   
achieved by 10/1/22. .                    
  
  
PT Impaired muscle   
performance and/or ROM                    
  
                                          
  
  
No                                        
   
                                                      
  
  
Improved Gait  
  
  
Description:  
  
  
  
  
  
LTG: Patient will demonstrate   
improved gait ability as   
evidenced by ambulation 150   
feet with rollator walker   
independently with AD, to   
return to safe household and   
community ambulation, in   
order to return to plf , to   
be achieved by 10/1/22. .  
  
  
  
  
                                          
  
  
PT Impaired gait                          
  
                                          
  
  
No                                        
   
                                                      
  
  
Improved Balance  
  
  
Description:  
  
  
  
  
  
LTG: Patient will demonstrate   
improved standing balance to   
meet functional goals as   
evidenced by TUG score of <28   
to be achieved by 10/1/22. .              
  
  
PT Impaired balance                       
  
                                          
  
  
No                                        
   
                                                      
  
  
Demonstrate understanding of   
education  
  
  
Description:  
  
  
Patient and/or caregiver will   
understand educational   
instruction to be achieved by   
10/1/22. .                                
  
  
PT Learning Assessment                    
  
                                          
  
  
No                                        
   
                                                      
  
  
Patient/caregiver will   
demonstrate ability to   
obtain, store, identify and   
administer ordered   
medications, keep accurate   
medication list in home, and   
adhere to medication schedule  
  
  
Description:  
  
  
Patient/caregiver will   
demonstrate ability to   
obtain, store, identify and   
administer ordered   
medications, keep accurate   
medication list in home, and   
adhere to medication schedule   
by 10/1/22.  
  
                                          
  
  
Medication Education                      
  
                                          
  
  
No                                        
   
                                                      
  
  
Patient/caregiver will be   
able to identify and report   
symptoms of sepsis  
  
  
Description:  
  
  
Patient/caregiver will be   
able to identify   
signs/symptoms of sepsis   
infection and will verbalize   
actions to take if suspected   
by 10/1/22. .                             
  
  
Sepsis                                    
  
                                          
  
  
No                                        
   
                                                      
  
  
Patient to maintain   
parameters within   
physician-specified ranges   
throughout certification   
period                                    
  
  
Physician Specific   
Parameters                                
  
                                          
  
  
No                                        
   
                                                      
  
  
Manage Risk for falls  
  
  
Description:  
  
  
Patient/caregiver will   
verbalize knowledge of   
individualized fall   
prevention strategies by   
10/1/22. .                                
  
  
Risk for Falls                            
  
                                          
  
  
No                                        
   
                                                      
  
  
Manage Pain  
  
  
Description:  
  
  
Patient/caregiver will   
verbalize knowledge and   
understanding of appropriate   
techniques to control pain,   
including pain medication and   
non-pharmacological   
techniques. Patient will   
verbalize or demonstrate an   
acceptable level of pain as   
evidenced by a pain score of   
<5/10 and improvement in   
ability to perform activities   
of daily living to be   
achieved by 10/1/22. .                    
  
  
Pain                                      
  
                                          
  
  
No                                        
   
                                                      
  
  
Manage discharge planning  
  
  
Description:  
  
  
Patient/caregiver will   
verbalize understanding of   
ongoing discharge plan   
provided related to disease   
management, arrangements for   
outpatient and/or community   
services, obtaining   
medications, supplies, and   
DME, as needed throughout   
certification period.                     
  
  
Discharge                                 
  
                                          
  
  
No                                        
  
  
                                  Interventions  
  
                                        Intervention        Associated   
Problem/Goal        Status              Variance            Visit Notes  
   
                                                      
  
  
Physical Therapy   
Therapeutic Exercises                   Problem:PT Impaired   
muscle performance   
and/or ROM  
Goal:Improved Muscle   
Performance and/or   
ROM                                       
  
  
Completed                                 
  
                                          
  
  
patient instructed on   
strengthening   
exercises including   
seated blue tband hip   
abd and flexion, laq   
and heel toe raises   
x's 1 each. standing   
toe raises, hip abd   
and flexion,   
hamstring curls x's   
15 each. with verbal   
and visual cues for   
correct form and   
pace. patient   
instructed to perform   
home exercise program   
twice a day which   
included above   
exercises .  
   
                                                      
  
  
Physical Therapy Gait   
Training                                Problem:PT Impaired   
gait  
Goal:Improved Gait                        
  
  
Completed                                 
  
                                          
  
  
Gait training and   
instruction to   
patient on safe   
ambulation with front   
wheeled walker for   
2x's50 feet with   
supervision, with   
verbal cues for   
corrections of gait   
deviations including   
posture and staying   
c;loser to ww.  
   
                                                      
  
  
Physical Therapy   
Balance Training                        Problem:PT Impaired   
balance  
Goal:Improved Balance                     
  
  
Completed                                 
  
                                          
  
  
Developed,   
implemented, and   
instructed patient on   
standing balance   
exercises including   
lateral stepping at   
counter 5feet x's 4   
w/ David UE support.   
step over w/ww   
3concesx's 2laps   
w/ww.  
   
                                                      
  
  
Instruct and educate   
on knowledge deficits                   Problem:PT Learning   
Assessment  
Goal:Demonstrate   
understanding of   
education                                 
  
  
Completed                                 
  
                                          
  
  
patient verbalize   
and/or demonstrate   
understanding of   
physical therapy   
education including   
home exercise   
program.  
  
  
  
  
  
Education methods   
include: verbal cues   
and visual cues.  
  
  
  
  
  
Further education   
required to improve   
knowledge and   
compliance with home   
exercise program.  
   
                                                      
  
  
Medication Education  
  
  
Description:  
  
  
Evaluate/instruct   
patient/caregiver on   
obtaining, storing,   
identifying and   
administering ordered   
medications as well as   
keeping accurate   
medication list in the   
home and adhereing to   
medication schedule                     Problem:Medication   
Education  
Goal:Patient/caregive  
r will demonstrate   
ability to obtain,   
store, identify and   
administer ordered   
medications, keep   
accurate medication   
list in home, and   
adhere to medication   
schedule                                  
  
  
Completed                                 
  
                                          
  
  
Patient instructed on   
importance of keeping   
accurate medication   
list in home.  
   
                                                      
  
  
Risk of Sepsis  
  
  
Description:  
  
  
Patient is at risk for   
sepsis. Monitor   
closely for s/s of   
sepsis.                                 Problem:Sepsis  
Goal:Patient/caregive  
r will be able to   
identify and report   
symptoms of sepsis                        
  
  
Completed                                 
  
                                          
   
                                                      
  
  
SPO2  
  
  
Description:  
  
  
Notify Dr. Ahuja   
if pulse ox is <92% at   
rest.                                   Problem:Physician   
Specific Parameters  
Goal:Patient to   
maintain parameters   
within   
physician-specified   
ranges throughout   
certification period                      
  
  
Completed                                 
  
                                          
   
                                                      
  
  
Instruct on individual   
fall risk factors and   
strategies to prevent   
falls and injuries   
caused by falls.                        Problem:Risk for   
Falls  
Goal:Manage Risk for   
falls                                     
  
  
Completed                                 
  
                                          
  
  
PT: Patient   
instructed on   
Managing Impaired   
Functional Mobility:   
Use assistive   
device(s): front   
wheeled walker  
  
  
   
                                                      
  
  
Instruct on pain and   
instruct on strategies   
to control pain                         Problem:Pain  
Goal:Manage Pain                          
  
  
Completed                                 
  
                                          
  
  
patient instructed on   
techniques to control   
pain including   
Non-Pharmacological   
measures; rest.  
   
                                                      
  
  
Deliver [facility]                           Problem:Discharge  
Goal:Manage discharge   
planning                                  
  
  
Completed                                 
  
                                          
  
  
Delivered [facility] on   
22 for discharge   
date of 22   
Patient denies   
questions/concerns w/   
form .  
   
                                                      
  
  
Instruct on ongoing   
discharge plan                          Problem:Discharge  
Goal:Manage discharge   
planning                                  
  
  
Completed                                 
  
                                          
  
  
Ongoing Discharge   
plan: Discharge plan   
discussed with   
patient including   
frequency and   
duration for home PT   
and plan for   
transition to: live   
independently at home   
without ongoing   
services.  
  
  
  
documented in this encounter  
[facility]'s home Plan of care note* Visit Details  
  
                                                    Visit Type -PT REASSESSMENT  
Discipline -Physical Therapy  
  
  
                                    Problems  
  
                    Problem   Description Start Date Status    Goals     Interve  
ntions  
   
                                                      
  
  
PT Impaired   
muscle   
performance   
and/or ROM  
  
  
Disciplines:  
  
  
PT                                                    
  
  
2022                                  
  
  
Active                                    
  
  
1 goal linked to   
scheduled/document  
ed intervention                           
  
  
1 goal   
intervention   
scheduled/document  
ed in this visit  
   
                                                      
  
  
PT Impaired   
mobility  
  
  
Disciplines:  
  
  
PT                                                    
  
  
2022                                  
  
  
Active                                    
  
  
1 goal linked to   
scheduled/document  
ed intervention                           
  
  
1 goal   
intervention   
scheduled/document  
ed in this visit  
   
                                                      
  
  
PT Impaired gait  
  
  
Disciplines:  
  
  
PT                                                    
  
  
2022                                  
  
  
Active                                    
  
  
1 goal linked to   
scheduled/document  
ed intervention                           
  
  
1 goal   
intervention   
scheduled/document  
ed in this visit  
   
                                                      
  
  
PT Learning   
Assessment  
  
  
Disciplines:  
  
  
PT                                                    
  
  
2022                                  
  
  
Active                                    
  
  
1 goal linked to   
scheduled/document  
ed intervention                           
  
  
1 goal   
intervention   
scheduled/document  
ed in this visit  
   
                                                      
  
  
Medication   
Education  
  
  
Disciplines:  
  
  
Skilled Services                                      
  
  
2022                                  
  
  
Active                                    
  
  
1 goal linked to   
scheduled/document  
ed intervention                           
  
  
1 goal   
intervention   
scheduled/document  
ed in this visit  
   
                                                      
  
  
Sepsis  
  
  
Disciplines:  
  
  
Skilled Services                                      
  
  
2022                                  
  
  
Active                                    
  
  
1 goal linked to   
scheduled/document  
ed intervention                           
  
  
1 goal   
intervention   
scheduled/document  
ed in this visit  
   
                                                      
  
  
Physician   
Specific   
Parameters  
  
  
Disciplines:  
  
  
Skilled Services                                      
  
  
2022                                  
  
  
Active                                    
  
  
1 goal linked to   
scheduled/document  
ed intervention                           
  
  
1 goal   
intervention   
scheduled/document  
ed in this visit  
   
                                                      
  
  
Risk for Falls  
  
  
Disciplines:  
  
  
Skilled Services                                      
  
  
2022                                  
  
  
Active                                    
  
  
1 goal linked to   
scheduled/document  
ed intervention                           
  
  
1 goal   
intervention   
scheduled/document  
ed in this visit  
   
                                                      
  
  
Pain  
  
  
Disciplines:  
  
  
Skilled Services                                      
  
  
2022                                  
  
  
Active                                    
  
  
1 goal linked to   
scheduled/document  
ed intervention                           
  
  
1 goal   
intervention   
scheduled/document  
ed in this visit  
   
                                                      
  
  
Discharge  
  
  
Disciplines:  
  
  
Skilled Services                                      
  
  
2022                                  
  
  
Active                                    
  
  
1 goal linked to   
scheduled/document  
ed intervention                           
  
  
1 goal   
intervention   
scheduled/document  
ed in this visit  
  
  
                                      Goals  
  
                      Goal       Associated Problem Outcome    Goal Met?  Visit   
Notes  
   
                                                      
  
  
Improved Muscle Performance   
and/or ROM  
  
  
Description:  
  
  
LTG: Patient will demonstrate   
improved muscle performance   
to meet functional goals as   
evidenced by ability to   
tolerate 5 sit to stands in   
30 sec and tolerate 10 min of   
a standing activity, to be   
achieved by 10/1/22. .  
  
  
updated 22: to be   
achieved by 10/14/22  
  
  
  
  
  
STG: Patient and/or caregiver   
will verbalize/demonstrate   
independence with home   
exercise program, to improve   
functional mobility, to be   
achieved by 9/10/22.updated   
22: to be achieved by   
10/14/22  
  
  
  
  
  
  
  
  
LTG: Patient will demonstrate   
improved left hip active   
range of motion to 100* in   
standing degrees, to meet   
functional goals, to be   
achieved by 10/1/22.  
  
  
updated 22: to be   
achieved by 10/14/22                      
  
  
PT Impaired muscle   
performance and/or ROM                    
  
                                          
  
  
No                                        
   
                                                      
  
  
Improved Transfers  
  
  
Description:  
  
  
  
  
  
  
  
  
LTG: Patient will demonstrate   
safe transfers to/from bed,   
chair, toilet, shower/tub and   
car independently with AD, to   
be achieved by 10/1/22. .  
  
  
Updated 22: to be   
achieved by 10/14/22  
  
                                          
  
  
PT Impaired mobility                      
  
                                          
  
  
No                                        
   
                                                      
  
  
Improved Gait  
  
  
Description:  
  
  
  
  
  
LTG: Patient will demonstrate   
improved gait ability as   
evidenced by ambulation 150   
feet with rollator walker   
independently with AD, to   
return to safe household and   
community ambulation, in   
order to return to plf , to   
be achieved by 10/1/22. .  
  
  
update 22: ambulate for   
3 minutes with FWW mod indep   
indicating improved endurance   
with adequate foot clearance   
to be met 10/14/22  
  
                                          
  
  
PT Impaired gait                          
  
                                          
  
  
No                                        
   
                                                      
  
  
Demonstrate understanding of   
education  
  
  
Description:  
  
  
Patient and/or caregiver will   
understand educational   
instruction to be achieved by   
10/1/22. .  
  
  
updated 22: to be   
achieved by 10/14/22                      
  
  
PT Learning Assessment                    
  
                                          
  
  
No                                        
   
                                                      
  
  
Patient/caregiver will   
demonstrate ability to   
obtain, store, identify and   
administer ordered   
medications, keep accurate   
medication list in home, and   
adhere to medication schedule  
  
  
Description:  
  
  
Patient/caregiver will   
demonstrate ability to   
obtain, store, identify and   
administer ordered   
medications, keep accurate   
medication list in home, and   
adhere to medication schedule   
by 10/1/22.  
  
  
updated 22: to be   
achieved by 10/14/22                      
  
  
Medication Education                      
  
                                          
  
  
No                                        
   
                                                      
  
  
Patient/caregiver will be   
able to identify and report   
symptoms of sepsis  
  
  
Description:  
  
  
Patient/caregiver will be   
able to identify   
signs/symptoms of sepsis   
infection and will verbalize   
actions to take if suspected   
by 10/1/22.  
  
  
updated 22: to be   
achieved by 10/14/22.                     
  
  
Sepsis                                    
  
                                          
  
  
No                                        
   
                                                      
  
  
Patient to maintain   
parameters within   
physician-specified ranges   
throughout certification   
period                                    
  
  
Physician Specific   
Parameters                                
  
                                          
  
  
No                                        
   
                                                      
  
  
Manage Risk for falls  
  
  
Description:  
  
  
Patient/caregiver will   
verbalize knowledge of   
individualized fall   
prevention strategies by   
10/1/22.  
  
  
updated 22: to be   
achieved by 10/14/22                      
  
  
Risk for Falls                            
  
                                          
  
  
No                                        
   
                                                      
  
  
Manage Pain  
  
  
Description:  
  
  
Patient/caregiver will   
verbalize knowledge and   
understanding of appropriate   
techniques to control pain,   
including pain medication and   
non-pharmacological   
techniques. Patient will   
verbalize or demonstrate an   
acceptable level of pain as   
evidenced by a pain score of   
<5/10 and improvement in   
ability to perform activities   
of daily living to be   
achieved by 10/1/22.  
  
  
updated 22: to be   
achieved by 10/14/22                      
  
  
Pain                                      
  
                                          
  
  
No                                        
   
                                                      
  
  
Manage discharge planning  
  
  
Description:  
  
  
Patient/caregiver will   
verbalize understanding of   
ongoing discharge plan   
provided related to disease   
management, arrangements for   
outpatient and/or community   
services, obtaining   
medications, supplies, and   
DME, as needed throughout   
certification period.                     
  
  
Discharge                                 
  
                                          
  
  
No                                        
  
  
                                  Interventions  
  
                                        Intervention        Associated   
Problem/Goal        Status              Variance            Visit Notes  
   
                                                      
  
  
Physical Therapy   
Therapeutic Exercises                   Problem:PT Impaired   
muscle performance   
and/or ROM  
Goal:Improved Muscle   
Performance and/or   
ROM                                       
  
  
Completed                                 
  
                                          
  
  
patient instructed on   
strengthening   
exercises including   
ist to stands x 8 reps   
with verbal cues for   
full uprght position.   
patient instructed to   
perform home exercise   
program twice a day   
which included   
previous program.  
   
                                                      
  
  
Physical Therapy   
Transfer Training                       Problem:PT Impaired   
mobility  
Goal:Improved   
Transfers                                 
  
  
Completed                                 
  
                                          
  
  
Transfer training and   
instruction to patient   
on safe transfers to   
and from bed and chair   
with supervision and   
verbal and visual cues   
for correct hand   
placement.  
   
                                                      
  
  
Physical Therapy Gait   
Training                                Problem:PT Impaired   
gait  
Goal:Improved Gait                        
  
  
Completed                                 
  
                                          
  
  
Gait training and   
instruction to patient   
on safe ambulation   
with rollator walker   
for 3 mintues 20   
seconds, , with   
decrease in pace and   
increased shuffle   
after 1.5 minutes with   
supervision, with   
verbal cues for   
corrections of gait   
deviations including   
increasing heel strike   
and upright posture.  
   
                                                      
  
  
Instruct and educate   
on knowledge deficits                   Problem:PT Learning   
Assessment  
Goal:Demonstrate   
understanding of   
education                                 
  
  
Completed                                 
  
                                          
  
  
patient verbalize   
and/or demonstrate   
understanding of   
physical therapy   
education including   
pain management, fall   
prevention strategies,   
functional activity   
and home exercise   
program.  
  
  
  
  
  
Education methods   
include: verbal cues.  
  
  
  
  
  
Further education   
required to improve   
knowledge and   
compliance with pain   
management, fall   
prevention strategies,   
functional activity   
and home exercise   
program.  
   
                                                      
  
  
Medication Education  
  
  
Description:  
  
  
Evaluate/instruct   
patient/caregiver on   
obtaining, storing,   
identifying and   
administering ordered   
medications as well   
as keeping accurate   
medication list in   
the home and   
adhereing to   
medication schedule                     Problem:Medication   
Education  
Goal:Patient/caregive  
r will demonstrate   
ability to obtain,   
store, identify and   
administer ordered   
medications, keep   
accurate medication   
list in home, and   
adhere to medication   
schedule                                  
  
  
Completed                                 
  
                                          
  
  
Patient instructed on   
adhering to medication   
schedule.  
   
                                                      
  
  
Risk of Sepsis  
  
  
Description:  
  
  
Patient is at risk   
for sepsis. Monitor   
closely for s/s of   
sepsis.                                 Problem:Sepsis  
Goal:Patient/caregive  
r will be able to   
identify and report   
symptoms of sepsis                        
  
  
Completed                                 
  
                                          
   
                                                      
  
  
SPO2  
  
  
Description:  
  
  
Notify Dr. Ahuja   
if pulse ox is <92%   
at rest.                                Problem:Physician   
Specific Parameters  
Goal:Patient to   
maintain parameters   
within   
physician-specified   
ranges throughout   
certification period                      
  
  
Completed                                 
  
                                          
   
                                                      
  
  
Instruct on   
individual fall risk   
factors and   
strategies to prevent   
falls and injuries   
caused by falls.                        Problem:Risk for   
Falls  
Goal:Manage Risk for   
falls                                     
  
  
Completed                                 
  
                                          
  
  
PT: Patient instructed   
on  
  
  
Eliminating   
Environmental Hazards:   
Keep pathways clear,   
Keep rooms and   
walkways well lit,   
Wear supportive shoes   
or non-skid socks and   
Keep frequently used   
items within reach  
  
  
Managing Impaired   
Functional Mobility:   
Use assistive   
device(s): rollator   
walker  
  
  
Managing Pain  
  
  
  
  
  
   
                                                      
  
  
Instruct on pain and   
instruct on   
strategies to control   
pain                                    Problem:Pain  
Goal:Manage Pain                          
  
  
Completed                                 
  
                                          
  
  
patient instructed on   
techniques to control   
pain including   
Pharmacological   
measures and   
Non-Pharmacological   
measures; rest.  
   
                                                      
  
  
Instruct on ongoing   
discharge plan                          Problem:Discharge  
Goal:Manage discharge   
planning                                  
  
  
Completed                                 
  
                                          
  
  
Ongoing Discharge   
plan: Discharge plan   
discussed with patient   
including frequency   
and duration for home   
PT and plan for   
transition to: live   
independently at home   
without ongoing   
services.  
  
  
  
documented in this encounter  
[facility] Details  
  
                                                    Visit Type -PTA ROUTINE  
Discipline -Physical Therapy  
  
  
                                    Problems  
  
                    Problem   Description Start Date Status    Goals     Interve  
ntions  
   
                                                      
  
  
PT Impaired   
muscle   
performance   
and/or ROM  
  
  
Disciplines:  
  
  
PT                                                    
  
  
2022                                  
  
  
Active                                    
  
  
1 goal linked to   
scheduled/document  
ed intervention                           
  
  
1 goal   
intervention   
scheduled/document  
ed in this visit  
   
                                                      
  
  
PT Impaired   
mobility  
  
  
Disciplines:  
  
  
PT                                                    
  
  
2022                                  
  
  
Active                                    
  
  
1 goal linked to   
scheduled/document  
ed intervention                           
  
  
1 goal   
intervention   
scheduled/document  
ed in this visit  
   
                                                      
  
  
PT Impaired gait  
  
  
Disciplines:  
  
  
PT                                                    
  
  
2022                                  
  
  
Active                                    
  
  
1 goal linked to   
scheduled/document  
ed intervention                           
  
  
1 goal   
intervention   
scheduled/document  
ed in this visit  
   
                                                      
  
  
PT Impaired   
balance  
  
  
Disciplines:  
  
  
PT                                                    
  
  
2022                                  
  
  
Active                                    
  
  
1 goal linked to   
scheduled/document  
ed intervention                           
  
  
1 goal   
intervention   
scheduled/document  
ed in this visit  
   
                                                      
  
  
PT Learning   
Assessment  
  
  
Disciplines:  
  
  
PT                                                    
  
  
2022                                  
  
  
Active                                    
  
  
1 goal linked to   
scheduled/document  
ed intervention                           
  
  
1 goal   
intervention   
scheduled/document  
ed in this visit  
   
                                                      
  
  
Medication   
Education  
  
  
Disciplines:  
  
  
Skilled Services                                      
  
  
2022                                  
  
  
Active                                    
  
  
1 goal linked to   
scheduled/document  
ed intervention                           
  
  
1 goal   
intervention   
scheduled/document  
ed in this visit  
   
                                                      
  
  
Sepsis  
  
  
Disciplines:  
  
  
Skilled Services                                      
  
  
2022                                  
  
  
Active                                    
  
  
1 goal linked to   
scheduled/document  
ed intervention                           
  
  
1 goal   
intervention   
scheduled/document  
ed in this visit  
   
                                                      
  
  
Physician   
Specific   
Parameters  
  
  
Disciplines:  
  
  
Skilled Services                                      
  
  
2022                                  
  
  
Active                                    
  
  
1 goal linked to   
scheduled/document  
ed intervention                           
  
  
1 goal   
intervention   
scheduled/document  
ed in this visit  
   
                                                      
  
  
Risk for Falls  
  
  
Disciplines:  
  
  
Skilled Services                                      
  
  
2022                                  
  
  
Active                                    
  
  
1 goal linked to   
scheduled/document  
ed intervention                           
  
  
1 goal   
intervention   
scheduled/document  
ed in this visit  
   
                                                      
  
  
Pain  
  
  
Disciplines:  
  
  
Skilled Services                                      
  
  
2022                                  
  
  
Active                                    
  
  
1 goal linked to   
scheduled/document  
ed intervention                           
  
  
1 goal   
intervention   
scheduled/document  
ed in this visit  
   
                                                      
  
  
Discharge  
  
  
Disciplines:  
  
  
Skilled Services                                      
  
  
2022                                  
  
  
Active                                    
  
  
1 goal linked to   
scheduled/document  
ed intervention                           
  
  
1 goal   
intervention   
scheduled/document  
ed in this visit  
  
  
                                      Goals  
  
                      Goal       Associated Problem Outcome    Goal Met?  Visit   
Notes  
   
                                                      
  
  
Improved Muscle Performance   
and/or ROM  
  
  
Description:  
  
  
LTG: Patient will demonstrate   
improved muscle performance   
to meet functional goals as   
evidenced by ability to   
tolerate 5 sit to stands in   
30 sec and tolerate 10 min of   
a standing activity, to be   
achieved by 10/1/22. .  
  
  
updated 22: to be   
achieved by 10/14/22  
  
  
  
  
  
STG: Patient and/or caregiver   
will verbalize/demonstrate   
independence with home   
exercise program, to improve   
functional mobility, to be   
achieved by 9/10/22.updated   
22: to be achieved by   
10/14/22  
  
  
  
  
  
  
  
  
LTG: Patient will demonstrate   
improved left hip active   
range of motion to 100* in   
standing degrees, to meet   
functional goals, to be   
achieved by 10/1/22.  
  
  
updated 22: to be   
achieved by 10/14/22                      
  
  
PT Impaired muscle   
performance and/or ROM                    
  
                                          
  
  
No                                        
   
                                                      
  
  
Improved Transfers  
  
  
Description:  
  
  
  
  
  
  
  
  
LTG: Patient will demonstrate   
safe transfers to/from bed,   
chair, toilet, shower/tub and   
car independently with AD, to   
be achieved by 10/1/22. .  
  
  
Updated 22: to be   
achieved by 10/14/22  
  
                                          
  
  
PT Impaired mobility                      
  
                                          
  
  
No                                        
   
                                                      
  
  
Improved Gait  
  
  
Description:  
  
  
  
  
  
LTG: Patient will demonstrate   
improved gait ability as   
evidenced by ambulation 150   
feet with rollator walker   
independently with AD, to   
return to safe household and   
community ambulation, in   
order to return to plf , to   
be achieved by 10/1/22. .  
  
  
update 22: ambulate for   
3 minutes with FWW mod indep   
indicating improved endurance   
with adequate foot clearance   
to be met 10/14/22  
  
                                          
  
  
PT Impaired gait                          
  
                                          
  
  
No                                        
   
                                                      
  
  
Improved Balance  
  
  
Description:  
  
  
  
  
  
LTG: Patient will demonstrate   
improved standing balance to   
meet functional goals as   
evidenced by TUG score of <28   
to be achieved by 10/1/22. .  
  
  
MET 22                               
  
  
PT Impaired balance                       
  
                                          
  
  
No                                        
   
                                                      
  
  
Demonstrate understanding of   
education  
  
  
Description:  
  
  
Patient and/or caregiver will   
understand educational   
instruction to be achieved by   
10/1/22. .  
  
  
updated 22: to be   
achieved by 10/14/22                      
  
  
PT Learning Assessment                    
  
                                          
  
  
No                                        
   
                                                      
  
  
Patient/caregiver will   
demonstrate ability to   
obtain, store, identify and   
administer ordered   
medications, keep accurate   
medication list in home, and   
adhere to medication schedule  
  
  
Description:  
  
  
Patient/caregiver will   
demonstrate ability to   
obtain, store, identify and   
administer ordered   
medications, keep accurate   
medication list in home, and   
adhere to medication schedule   
by 10/1/22.  
  
  
updated 22: to be   
achieved by 10/14/22                      
  
  
Medication Education                      
  
                                          
  
  
No                                        
   
                                                      
  
  
Patient/caregiver will be   
able to identify and report   
symptoms of sepsis  
  
  
Description:  
  
  
Patient/caregiver will be   
able to identify   
signs/symptoms of sepsis   
infection and will verbalize   
actions to take if suspected   
by 10/1/22.  
  
  
updated 22: to be   
achieved by 10/14/22.                     
  
  
Sepsis                                    
  
                                          
  
  
No                                        
   
                                                      
  
  
Patient to maintain   
parameters within   
physician-specified ranges   
throughout certification   
period                                    
  
  
Physician Specific   
Parameters                                
  
                                          
  
  
No                                        
   
                                                      
  
  
Manage Risk for falls  
  
  
Description:  
  
  
Patient/caregiver will   
verbalize knowledge of   
individualized fall   
prevention strategies by   
10/1/22.  
  
  
updated 22: to be   
achieved by 10/14/22                      
  
  
Risk for Falls                            
  
                                          
  
  
No                                        
   
                                                      
  
  
Manage Pain  
  
  
Description:  
  
  
Patient/caregiver will   
verbalize knowledge and   
understanding of appropriate   
techniques to control pain,   
including pain medication and   
non-pharmacological   
techniques. Patient will   
verbalize or demonstrate an   
acceptable level of pain as   
evidenced by a pain score of   
<5/10 and improvement in   
ability to perform activities   
of daily living to be   
achieved by 10/1/22.  
  
  
updated 22: to be   
achieved by 10/14/22                      
  
  
Pain                                      
  
                                          
  
  
No                                        
   
                                                      
  
  
Manage discharge planning  
  
  
Description:  
  
  
Patient/caregiver will   
verbalize understanding of   
ongoing discharge plan   
provided related to disease   
management, arrangements for   
outpatient and/or community   
services, obtaining   
medications, supplies, and   
DME, as needed throughout   
certification period.                     
  
  
Discharge                                 
  
                                          
  
  
No                                        
  
  
                                  Interventions  
  
                                        Intervention        Associated   
Problem/Goal        Status              Variance            Visit Notes  
   
                                                      
  
  
Physical Therapy   
Therapeutic Exercises                   Problem:PT Impaired   
muscle performance   
and/or ROM  
Goal:Improved Muscle   
Performance and/or   
ROM                                       
  
  
Completed                                 
  
                                          
  
  
patient instructed on   
strengthening   
exercises including   
standing hip abd and   
flexion, hamstring   
curls x's 10 each.   
seated: laq, hip   
flexion and adduction   
and heel raises x's   
10 each. with verbal   
cues for correct pace   
. patient instructed   
to perform home   
exercise program   
twice a day which   
included above   
exercises .  
   
                                                      
  
  
Physical Therapy   
Transfer Training                       Problem:PT Impaired   
mobility  
Goal:Improved   
Transfers                                 
  
  
Completed                                 
  
                                          
  
  
Transfer training and   
instruction to   
patient on safe   
transfers to and from   
chair with   
supervision and   
verbal cues for   
reaching back for   
chair  
   
                                                      
  
  
Physical Therapy Gait   
Training                                Problem:PT Impaired   
gait  
Goal:Improved Gait                        
  
  
Completed                                 
  
                                          
  
  
Gait training and   
instruction to   
patient on safe   
ambulation with front   
wheeled walker for   
2x's 80 feet with   
stand by assist, with   
verbal cues for   
corrections of gait   
deviations including   
posture and staying   
closer to walker.  
   
                                                      
  
  
Physical Therapy   
Balance Training                        Problem:PT Impaired   
balance  
Goal:Improved Balance                     
  
  
Completed                                 
  
                                          
  
  
Developed,   
implemented, and   
instructed patient on   
standing balance   
exercises including   
lateral stepping at   
counter 5feet x's 4.  
   
                                                      
  
  
Instruct and educate   
on knowledge deficits                   Problem:PT Learning   
Assessment  
Goal:Demonstrate   
understanding of   
education                                 
  
  
Completed                                 
  
                                          
  
  
patient verbalize   
and/or demonstrate   
understanding of   
physical therapy   
education including   
home exercise   
program.  
  
  
  
  
  
Education methods   
include: verbal cues.  
  
  
  
  
  
Further education   
required to improve   
knowledge and   
compliance with home   
exercise program.  
   
                                                      
  
  
Medication Education  
  
  
Description:  
  
  
Evaluate/instruct   
patient/caregiver on   
obtaining, storing,   
identifying and   
administering ordered   
medications as well as   
keeping accurate   
medication list in the   
home and adhereing to   
medication schedule                     Problem:Medication   
Education  
Goal:Patient/caregive  
r will demonstrate   
ability to obtain,   
store, identify and   
administer ordered   
medications, keep   
accurate medication   
list in home, and   
adhere to medication   
schedule                                  
  
  
Completed                                 
  
                                          
  
  
Patient instructed on   
importance of keeping   
accurate medication   
list in home.  
   
                                                      
  
  
Risk of Sepsis  
  
  
Description:  
  
  
Patient is at risk for   
sepsis. Monitor   
closely for s/s of   
sepsis.                                 Problem:Sepsis  
Goal:Patient/caregive  
r will be able to   
identify and report   
symptoms of sepsis                        
  
  
Completed                                 
  
                                          
   
                                                      
  
  
SPO2  
  
  
Description:  
  
  
Notify Dr. Ahuja   
if pulse ox is <92% at   
rest.                                   Problem:Physician   
Specific Parameters  
Goal:Patient to   
maintain parameters   
within   
physician-specified   
ranges throughout   
certification period                      
  
  
Completed                                 
  
                                          
   
                                                      
  
  
Instruct on individual   
fall risk factors and   
strategies to prevent   
falls and injuries   
caused by falls.                        Problem:Risk for   
Falls  
Goal:Manage Risk for   
falls                                     
  
  
Completed                                 
  
                                          
  
  
PT: Patient   
instructed on   
Managing Impaired   
Functional Mobility:   
Use assistive   
device(s): front   
wheeled walker  
  
  
   
                                                      
  
  
Instruct on pain and   
instruct on strategies   
to control pain                         Problem:Pain  
Goal:Manage Pain                          
  
  
Completed                                 
  
                                          
  
  
patient instructed on   
techniques to control   
pain including   
Non-Pharmacological   
measures;   
positioning/elevation  
.  
   
                                                      
  
  
Instruct on ongoing   
discharge plan                          Problem:Discharge  
Goal:Manage discharge   
planning                                  
  
  
Completed                                 
  
                                          
  
  
Ongoing Discharge   
plan: Discharge plan   
discussed with   
patient including   
frequency and   
duration for home PT   
and plan for   
transition to: live   
independently at home   
without ongoing   
services.  
  
  
  
documented in this encounter  
[facility]'s home Plan of care note* Visit Details  
  
                                                    Visit Type -PTA ROUTINE  
Discipline -Physical Therapy  
  
  
                                    Problems  
  
                    Problem   Description Start Date Status    Goals     Interve  
ntions  
   
                                                      
  
  
PT Impaired   
muscle   
performance   
and/or ROM  
  
  
Disciplines:  
  
  
PT                                                    
  
  
2022                                  
  
  
Active                                    
  
  
1 goal linked to   
scheduled/document  
ed intervention                           
  
  
1 goal   
intervention   
scheduled/document  
ed in this visit  
   
                                                      
  
  
PT Impaired   
mobility  
  
  
Disciplines:  
  
  
PT                                                    
  
  
2022                                  
  
  
Active                                    
  
  
1 goal linked to   
scheduled/document  
ed intervention                           
  
  
1 goal   
intervention   
scheduled/document  
ed in this visit  
   
                                                      
  
  
PT Impaired gait  
  
  
Disciplines:  
  
  
PT                                                    
  
  
2022                                  
  
  
Active                                    
  
  
1 goal linked to   
scheduled/document  
ed intervention                           
  
  
1 goal   
intervention   
scheduled/document  
ed in this visit  
   
                                                      
  
  
PT Impaired   
balance  
  
  
Disciplines:  
  
  
PT                                                    
  
  
2022                                  
  
  
Active                                    
  
  
1 goal linked to   
scheduled/document  
ed intervention                           
  
  
1 goal   
intervention   
scheduled/document  
ed in this visit  
   
                                                      
  
  
PT Learning   
Assessment  
  
  
Disciplines:  
  
  
PT                                                    
  
  
2022                                  
  
  
Active                                    
  
  
1 goal linked to   
scheduled/document  
ed intervention                           
  
  
1 goal   
intervention   
scheduled/document  
ed in this visit  
   
                                                      
  
  
Medication   
Education  
  
  
Disciplines:  
  
  
Skilled Services                                      
  
  
2022                                  
  
  
Active                                    
  
  
1 goal linked to   
scheduled/document  
ed intervention                           
  
  
1 goal   
intervention   
scheduled/document  
ed in this visit  
   
                                                      
  
  
Sepsis  
  
  
Disciplines:  
  
  
Skilled Services                                      
  
  
2022                                  
  
  
Active                                    
  
  
1 goal linked to   
scheduled/document  
ed intervention                           
  
  
1 goal   
intervention   
scheduled/document  
ed in this visit  
   
                                                      
  
  
Physician   
Specific   
Parameters  
  
  
Disciplines:  
  
  
Skilled Services                                      
  
  
2022                                  
  
  
Active                                    
  
  
1 goal linked to   
scheduled/document  
ed intervention                           
  
  
1 goal   
intervention   
scheduled/document  
ed in this visit  
   
                                                      
  
  
Risk for Falls  
  
  
Disciplines:  
  
  
Skilled Services                                      
  
  
2022                                  
  
  
Active                                    
  
  
1 goal linked to   
scheduled/document  
ed intervention                           
  
  
1 goal   
intervention   
scheduled/document  
ed in this visit  
   
                                                      
  
  
Pain  
  
  
Disciplines:  
  
  
Skilled Services                                      
  
  
2022                                  
  
  
Active                                    
  
  
1 goal linked to   
scheduled/document  
ed intervention                           
  
  
1 goal   
intervention   
scheduled/document  
ed in this visit  
   
                                                      
  
  
Discharge  
  
  
Disciplines:  
  
  
Skilled Services                                      
  
  
2022                                  
  
  
Active                                    
  
  
1 goal linked to   
scheduled/document  
ed intervention                           
  
  
2 goal   
interventions   
scheduled/document  
ed in this visit  
  
  
                                      Goals  
  
                      Goal       Associated Problem Outcome    Goal Met?  Visit   
Notes  
   
                                                      
  
  
Improved Muscle Performance   
and/or ROM  
  
  
Description:  
  
  
LTG: Patient will demonstrate   
improved muscle performance   
to meet functional goals as   
evidenced by ability to   
tolerate 5 sit to stands in   
30 sec and tolerate 10 min of   
a standing activity, to be   
achieved by 10/1/22. .  
  
  
updated 22: to be   
achieved by 10/14/22  
  
  
  
  
  
STG: Patient and/or caregiver   
will verbalize/demonstrate   
independence with home   
exercise program, to improve   
functional mobility, to be   
achieved by 9/10/22.updated   
22: to be achieved by   
10/14/22  
  
  
  
  
  
  
  
  
LTG: Patient will demonstrate   
improved left hip active   
range of motion to 100* in   
standing degrees, to meet   
functional goals, to be   
achieved by 10/1/22.  
  
  
updated 22: to be   
achieved by 10/14/22                      
  
  
PT Impaired muscle   
performance and/or ROM                    
  
                                          
  
  
No                                        
   
                                                      
  
  
Improved Transfers  
  
  
Description:  
  
  
  
  
  
  
  
  
LTG: Patient will demonstrate   
safe transfers to/from bed,   
chair, toilet, shower/tub and   
car independently with AD, to   
be achieved by 10/1/22. .  
  
  
Updated 22: to be   
achieved by 10/14/22  
  
                                          
  
  
PT Impaired mobility                      
  
                                          
  
  
No                                        
   
                                                      
  
  
Improved Gait  
  
  
Description:  
  
  
  
  
  
LTG: Patient will demonstrate   
improved gait ability as   
evidenced by ambulation 150   
feet with rollator walker   
independently with AD, to   
return to safe household and   
community ambulation, in   
order to return to plf , to   
be achieved by 10/1/22. .  
  
  
update 22: ambulate for   
3 minutes with FWW mod indep   
indicating improved endurance   
with adequate foot clearance   
to be met 10/14/22  
  
                                          
  
  
PT Impaired gait                          
  
                                          
  
  
No                                        
   
                                                      
  
  
Improved Balance  
  
  
Description:  
  
  
  
  
  
LTG: Patient will demonstrate   
improved standing balance to   
meet functional goals as   
evidenced by TUG score of <28   
to be achieved by 10/1/22. .  
  
  
MET 22                               
  
  
PT Impaired balance                       
  
                                          
  
  
No                                        
   
                                                      
  
  
Demonstrate understanding of   
education  
  
  
Description:  
  
  
Patient and/or caregiver will   
understand educational   
instruction to be achieved by   
10/1/22. .  
  
  
updated 22: to be   
achieved by 10/14/22                      
  
  
PT Learning Assessment                    
  
                                          
  
  
No                                        
   
                                                      
  
  
Patient/caregiver will   
demonstrate ability to   
obtain, store, identify and   
administer ordered   
medications, keep accurate   
medication list in home, and   
adhere to medication schedule  
  
  
Description:  
  
  
Patient/caregiver will   
demonstrate ability to   
obtain, store, identify and   
administer ordered   
medications, keep accurate   
medication list in home, and   
adhere to medication schedule   
by 10/1/22.  
  
  
updated 22: to be   
achieved by 10/14/22                      
  
  
Medication Education                      
  
                                          
  
  
No                                        
   
                                                      
  
  
Patient/caregiver will be   
able to identify and report   
symptoms of sepsis  
  
  
Description:  
  
  
Patient/caregiver will be   
able to identify   
signs/symptoms of sepsis   
infection and will verbalize   
actions to take if suspected   
by 10/1/22.  
  
  
updated 22: to be   
achieved by 10/14/22.                     
  
  
Sepsis                                    
  
                                          
  
  
No                                        
   
                                                      
  
  
Patient to maintain   
parameters within   
physician-specified ranges   
throughout certification   
period                                    
  
  
Physician Specific   
Parameters                                
  
                                          
  
  
No                                        
   
                                                      
  
  
Manage Risk for falls  
  
  
Description:  
  
  
Patient/caregiver will   
verbalize knowledge of   
individualized fall   
prevention strategies by   
10/1/22.  
  
  
updated 22: to be   
achieved by 10/14/22                      
  
  
Risk for Falls                            
  
                                          
  
  
No                                        
   
                                                      
  
  
Manage Pain  
  
  
Description:  
  
  
Patient/caregiver will   
verbalize knowledge and   
understanding of appropriate   
techniques to control pain,   
including pain medication and   
non-pharmacological   
techniques. Patient will   
verbalize or demonstrate an   
acceptable level of pain as   
evidenced by a pain score of   
<5/10 and improvement in   
ability to perform activities   
of daily living to be   
achieved by 10/1/22.  
  
  
updated 22: to be   
achieved by 10/14/22                      
  
  
Pain                                      
  
                                          
  
  
No                                        
   
                                                      
  
  
Manage discharge planning  
  
  
Description:  
  
  
Patient/caregiver will   
verbalize understanding of   
ongoing discharge plan   
provided related to disease   
management, arrangements for   
outpatient and/or community   
services, obtaining   
medications, supplies, and   
DME, as needed throughout   
certification period.                     
  
  
Discharge                                 
  
                                          
  
  
No                                        
  
  
                                  Interventions  
  
                                        Intervention        Associated   
Problem/Goal        Status              Variance            Visit Notes  
   
                                                      
  
  
Physical Therapy   
Therapeutic Exercises                   Problem:PT Impaired   
muscle performance   
and/or ROM  
Goal:Improved Muscle   
Performance and/or   
ROM                                       
  
  
Completed                                 
  
                                          
  
  
patient instructed on   
strengthening   
exercises including   
seated: laq, hip   
flexion and adduction   
x's 15each. standing    
hip f;lexion and   
abduction, hamstring   
curls x's 10 each with   
verbal cues for   
posture and pacing .   
patient instructed to   
perform home exercise   
program twice a day   
which included above   
exercises .  
   
                                                      
  
  
Physical Therapy   
Transfer Training                       Problem:PT Impaired   
mobility  
Goal:Improved   
Transfers                                 
  
  
Completed                                 
  
                                          
  
  
Transfer training and   
instruction to patient   
on safe transfers to   
and from chair with   
supervision and verbal   
cues for reaching back   
for chair .  
   
                                                      
  
  
Physical Therapy Gait   
Training                                Problem:PT Impaired   
gait  
Goal:Improved Gait                        
  
  
Completed                                 
  
                                          
  
  
Gait training and   
instruction to patient   
on safe ambulation   
with rollator walker   
for 2x's 80 feet with   
supervision, with   
verbal cues for   
corrections of gait   
deviations including   
staying closer to   
walker for upright   
posture and safety.  
   
                                                      
  
  
Physical Therapy   
Balance Training                        Problem:PT Impaired   
balance  
Goal:Improved Balance                     
  
  
Completed                                 
  
                                          
  
  
Developed,   
implemented, and   
instructed patient on   
standing balance   
exercises including   
standing exercises .  
   
                                                      
  
  
Instruct and educate   
on knowledge deficits                   Problem:PT Learning   
Assessment  
Goal:Demonstrate   
understanding of   
education                                 
  
  
Completed                                 
  
                                          
  
  
patient verbalize   
and/or demonstrate   
understanding of   
physical therapy   
education including   
home exercise program.  
  
  
  
  
  
Education methods   
include: verbal cues   
and visual cues.  
  
  
  
  
  
Further education   
required to improve   
knowledge and   
compliance with home   
exercise program.  
   
                                                      
  
  
Medication Education  
  
  
Description:  
  
  
Evaluate/instruct   
patient/caregiver on   
obtaining, storing,   
identifying and   
administering ordered   
medications as well   
as keeping accurate   
medication list in   
the home and   
adhereing to   
medication schedule                     Problem:Medication   
Education  
Goal:Patient/caregive  
r will demonstrate   
ability to obtain,   
store, identify and   
administer ordered   
medications, keep   
accurate medication   
list in home, and   
adhere to medication   
schedule                                  
  
  
Completed                                 
  
                                          
  
  
Patient instructed on   
importance of keeping   
accurate medication   
list in home and   
adhering to medication   
schedule.  
   
                                                      
  
  
Risk of Sepsis  
  
  
Description:  
  
  
Patient is at risk   
for sepsis. Monitor   
closely for s/s of   
sepsis.                                 Problem:Sepsis  
Goal:Patient/caregive  
r will be able to   
identify and report   
symptoms of sepsis                        
  
  
Completed                                 
  
                                          
   
                                                      
  
  
SPO2  
  
  
Description:  
  
  
Notify Dr. Ahuja   
if pulse ox is <92%   
at rest.                                Problem:Physician   
Specific Parameters  
Goal:Patient to   
maintain parameters   
within   
physician-specified   
ranges throughout   
certification period                      
  
  
Completed                                 
  
                                          
   
                                                      
  
  
Instruct on   
individual fall risk   
factors and   
strategies to prevent   
falls and injuries   
caused by falls.                        Problem:Risk for   
Falls  
Goal:Manage Risk for   
falls                                     
  
  
Completed                                 
  
                                          
  
  
PT: Patient instructed   
on Managing Impaired   
Functional Mobility:   
Use assistive   
device(s): front   
wheeled walker and   
rollator walker  
  
  
   
                                                      
  
  
Instruct on pain and   
instruct on   
strategies to control   
pain                                    Problem:Pain  
Goal:Manage Pain                          
  
  
Completed                                 
  
                                          
  
  
patient instructed on   
techniques to control   
pain including   
Pharmacological   
measures and   
Non-Pharmacological   
measures; rest.  
   
                                                      
  
  
Deliver [facility]                           Problem:Discharge  
Goal:Manage discharge   
planning                                  
  
  
Completed                                 
  
                                          
  
  
Delivered [facility] on   
10/10/22 for discharge   
date of 10./12/22.   
Patient denies   
questions/concerns   
w/form  
   
                                                      
  
  
Instruct on ongoing   
discharge plan                          Problem:Discharge  
Goal:Manage discharge   
planning                                  
  
  
Completed                                 
  
                                          
  
  
Ongoing Discharge   
plan: Discharge plan   
discussed with patient   
including frequency   
and duration for home   
PT and plan for   
transition to:   
caregiver assistance.  
  
  
  
documented in this encounter  
[facility] Details  
  
                                                    Visit Type -PT [facility]  
Discipline -Physical Therapy  
  
  
                                    Problems  
  
                    Problem   Description Start Date Status    Goals     Interve  
ntions  
   
                                                      
  
  
PT Impaired   
muscle   
performance   
and/or ROM  
  
  
Disciplines:  
  
  
PT                                                    
  
  
2022                                  
  
  
Resolved on   
10/12/2022                                
  
  
1 goal linked to   
scheduled/documen  
wu intervention                          
  
  
1 goal   
intervention   
scheduled/document  
ed in this visit  
   
                                                      
  
  
PT Impaired   
mobility  
  
  
Disciplines:  
  
  
PT                                                    
  
  
2022                                  
  
  
Resolved on   
10/12/2022                                
  
  
1 goal linked to   
scheduled/documen  
wu intervention                          
  
  
1 goal   
intervention   
scheduled/document  
ed in this visit  
   
                                                      
  
  
PT Impaired gait  
  
  
Disciplines:  
  
  
PT                                                    
  
  
2022                                  
  
  
Resolved on   
10/12/2022                                
  
  
1 goal linked to   
scheduled/documen  
wu intervention                          
  
  
1 goal   
intervention   
scheduled/document  
ed in this visit  
   
                                                      
  
  
PT Impaired   
balance  
  
  
Disciplines:  
  
  
PT                                                    
  
  
2022                                  
  
  
Resolved on   
10/12/2022                                
  
  
1 goal linked to   
scheduled/documen  
wu intervention                          
  
  
1 goal   
intervention   
scheduled/document  
ed in this visit  
   
                                                      
  
  
PT Learning   
Assessment  
  
  
Disciplines:  
  
  
PT                                                    
  
  
2022                                  
  
  
Resolved on   
10/12/2022                                
  
  
1 goal linked to   
scheduled/documen  
wu intervention                          
  
  
1 goal   
intervention   
scheduled/document  
ed in this visit  
   
                                                      
  
  
Medication   
Education  
  
  
Disciplines:  
  
  
Skilled Services                                      
  
  
2022                                  
  
  
Resolved on   
10/12/2022                                
  
  
1 goal linked to   
scheduled/documen  
wu intervention                          
  
  
1 goal   
intervention   
scheduled/document  
ed in this visit  
   
                                                      
  
  
Sepsis  
  
  
Disciplines:  
  
  
Skilled Services                                      
  
  
2022                                  
  
  
Resolved on   
10/12/2022                                
  
  
1 goal linked to   
scheduled/documen  
wu intervention                          
  
  
1 goal   
intervention   
scheduled/document  
ed in this visit  
   
                                                      
  
  
Physician   
Specific   
Parameters  
  
  
Disciplines:  
  
  
Skilled Services                                      
  
  
2022                                  
  
  
Resolved on   
10/12/2022                                
  
  
1 goal linked to   
scheduled/documen  
wu intervention                          
  
  
1 goal   
intervention   
scheduled/document  
ed in this visit  
   
                                                      
  
  
Risk for Falls  
  
  
Disciplines:  
  
  
Skilled Services                                      
  
  
2022                                  
  
  
Resolved on   
10/12/2022                                
  
  
1 goal linked to   
scheduled/documen  
wu intervention                          
  
  
1 goal   
intervention   
scheduled/document  
ed in this visit  
   
                                                      
  
  
Pain  
  
  
Disciplines:  
  
  
Skilled Services                                      
  
  
2022                                  
  
  
Resolved on   
10/12/2022                                
  
  
1 goal linked to   
scheduled/documen  
wu intervention                          
  
  
1 goal   
intervention   
scheduled/document  
ed in this visit  
   
                                                      
  
  
Discharge  
  
  
Disciplines:  
  
  
Skilled Services                                      
  
  
2022                                  
  
  
Resolved on   
10/12/2022                                
  
  
1 goal linked to   
scheduled/documen  
wu intervention                          
  
  
1 goal   
intervention   
scheduled/document  
ed in this visit  
  
  
                                      Goals  
  
                      Goal       Associated Problem Outcome    Goal Met?  Visit   
Notes  
   
                                                      
  
  
Improved Muscle Performance   
and/or ROM  
  
  
Description:  
  
  
LTG: Patient will   
demonstrate improved muscle   
performance to meet   
functional goals as   
evidenced by ability to   
tolerate 5 sit to stands in   
30 sec and tolerate 10 min   
of a standing activity, to   
be achieved by 10/1/22. .  
  
  
updated 22: to be   
achieved by 10/14/22  
  
  
  
  
  
STG: Patient and/or   
caregiver will   
verbalize/demonstrate   
independence with home   
exercise program, to   
improve functional   
mobility, to be achieved by   
9/10/22.updated 22: to   
be achieved by 10/14/22  
  
  
  
  
  
  
  
  
LTG: Patient will   
demonstrate improved left   
hip active range of motion   
to 100* in standing   
degrees, to meet functional   
goals, to be achieved by   
10/1/22.  
  
  
updated 22: to be   
achieved by 10/14/22                      
  
  
PT Impaired muscle   
performance and/or ROM                    
  
  
Completed                                 
  
  
Yes                                       
   
                                                      
  
  
Improved Transfers  
  
  
Description:  
  
  
  
  
  
  
  
  
LTG: Patient will   
demonstrate safe transfers   
to/from bed, chair, toilet,   
shower/tub and car   
independently with AD, to   
be achieved by 10/1/22. .  
  
  
Updated 22: to be   
achieved by 10/14/22  
  
                                          
  
  
PT Impaired mobility                      
  
  
Completed                                 
  
  
Yes                                       
   
                                                      
  
  
Improved Gait  
  
  
Description:  
  
  
  
  
  
LTG: Patient will   
demonstrate improved gait   
ability as evidenced by   
ambulation 150 feet with   
rollator walker   
independently with AD, to   
return to safe household   
and community ambulation,   
in order to return to plf ,   
to be achieved by 10/1/22.   
.  
  
  
update 22: ambulate   
for 3 minutes with FWW mod   
indep indicating improved   
endurance with adequate   
foot clearance to be met   
10/14/22  
  
                                          
  
  
PT Impaired gait                          
  
  
Completed                                 
  
  
Yes                                       
   
                                                      
  
  
Improved Balance  
  
  
Description:  
  
  
  
  
  
LTG: Patient will   
demonstrate improved   
standing balance to meet   
functional goals as   
evidenced by TUG score of   
<28 to be achieved by   
10/1/22. .  
  
  
MET 22                               
  
  
PT Impaired balance                       
  
  
Completed                                 
  
  
Yes                                       
   
                                                      
  
  
Demonstrate understanding   
of education  
  
  
Description:  
  
  
Patient and/or caregiver   
will understand educational   
instruction to be achieved   
by 10/1/22. .  
  
  
updated 22: to be   
achieved by 10/14/22                      
  
  
PT Learning Assessment                    
  
  
Completed                                 
  
  
Yes                                       
   
                                                      
  
  
Patient/caregiver will   
demonstrate ability to   
obtain, store, identify and   
administer ordered   
medications, keep accurate   
medication list in home,   
and adhere to medication   
schedule  
  
  
Description:  
  
  
Patient/caregiver will   
demonstrate ability to   
obtain, store, identify and   
administer ordered   
medications, keep accurate   
medication list in home,   
and adhere to medication   
schedule by 10/1/22.  
  
  
updated 22: to be   
achieved by 10/14/22                      
  
  
Medication Education                      
  
  
Completed                                 
  
  
Yes                                       
   
                                                      
  
  
Patient/caregiver will be   
able to identify and report   
symptoms of sepsis  
  
  
Description:  
  
  
Patient/caregiver will be   
able to identify   
signs/symptoms of sepsis   
infection and will   
verbalize actions to take   
if suspected by 10/1/22.  
  
  
updated 22: to be   
achieved by 10/14/22.                     
  
  
Sepsis                                    
  
  
Completed                                 
  
  
Yes                                       
   
                                                      
  
  
Patient to maintain   
parameters within   
physician-specified ranges   
throughout certification   
period                                    
  
  
Physician Specific   
Parameters                                
  
  
Completed                                 
  
  
Yes                                       
   
                                                      
  
  
Manage Risk for falls  
  
  
Description:  
  
  
Patient/caregiver will   
verbalize knowledge of   
individualized fall   
prevention strategies by   
10/1/22.  
  
  
updated 22: to be   
achieved by 10/14/22                      
  
  
Risk for Falls                            
  
  
Completed                                 
  
  
Yes                                       
   
                                                      
  
  
Manage Pain  
  
  
Description:  
  
  
Patient/caregiver will   
verbalize knowledge and   
understanding of   
appropriate techniques to   
control pain, including   
pain medication and   
non-pharmacological   
techniques. Patient will   
verbalize or demonstrate an   
acceptable level of pain as   
evidenced by a pain score   
of <5/10 and improvement in   
ability to perform   
activities of daily living   
to be achieved by 10/1/22.  
  
  
updated 22: to be   
achieved by 10/14/22                      
  
  
Pain                                      
  
  
Completed                                 
  
  
Yes                                       
   
                                                      
  
  
Manage discharge planning  
  
  
Description:  
  
  
Patient/caregiver will   
verbalize understanding of   
ongoing discharge plan   
provided related to disease   
management, arrangements   
for outpatient and/or   
community services,   
obtaining medications,   
supplies, and DME, as   
needed throughout   
certification period.                     
  
  
Discharge                                 
  
  
Completed                                 
  
  
Yes                                       
  
  
                                  Interventions  
  
                                        Intervention        Associated   
Problem/Goal        Status              Variance            Visit Notes  
   
                                                      
  
  
Physical Therapy   
Therapeutic Exercises                   Problem:PT Impaired   
muscle performance   
and/or ROM  
Goal:Improved Muscle   
Performance and/or   
ROM                                       
  
  
Completed                                 
  
                                          
  
  
patient instructed on   
strengthening   
exercises including   
seated: laq, hip   
flexion and adduction   
x's 15each. standing    
hip f;lexion and   
abduction, hamstring   
curls x's 10 each with   
verbal cues for   
posture and pacing .   
patient instructed to   
perform home exercise   
program twice a day   
which included above   
exercises  
  
  
   
                                                      
  
  
Physical Therapy   
Transfer Training                       Problem:PT Impaired   
mobility  
Goal:Improved   
Transfers                                 
  
  
Completed                                 
  
                                          
  
  
Transfers are DARIUS   
with safe/proper   
technique  
   
                                                      
  
  
Physical Therapy Gait   
Training                                Problem:PT Impaired   
gait  
Goal:Improved Gait                        
  
  
Completed                                 
  
                                          
  
  
Indep amb with WW with   
steady recip pattern,   
pt must make an effort   
to  his toes ,   
has no toe off  
   
                                                      
  
  
Physical Therapy   
Balance Training                        Problem:PT Impaired   
balance  
Goal:Improved Balance                     
  
  
Completed                                 
  
                                          
  
  
pt demonstrates   
improved dynamic   
standing balance as   
evidenced by a tug of   
28  
   
                                                      
  
  
Instruct and educate   
on knowledge deficits                   Problem:PT Learning   
Assessment  
Goal:Demonstrate   
understanding of   
education                                 
  
  
Completed                                 
  
                                          
  
  
patient verbalize   
and/or demonstrate   
understanding of   
physical therapy   
education including   
pain management, fall   
prevention strategies,   
home safety,   
functional activity   
and home exercise   
program.  
  
  
  
  
  
Education methods   
include: verbal cues.  
  
  
  
  
  
   
                                                      
  
  
Medication Education  
  
  
Description:  
  
  
Evaluate/instruct   
patient/caregiver on   
obtaining, storing,   
identifying and   
administering ordered   
medications as well   
as keeping accurate   
medication list in   
the home and   
adhereing to   
medication schedule                     Problem:Medication   
Education  
Goal:Patient/caregive  
r will demonstrate   
ability to obtain,   
store, identify and   
administer ordered   
medications, keep   
accurate medication   
list in home, and   
adhere to medication   
schedule                                  
  
  
Completed                                 
  
                                          
  
  
Patient instructed on   
importance of keeping   
accurate medication   
list in home and   
adhering to medication   
schedule.  
   
                                                      
  
  
Risk of Sepsis  
  
  
Description:  
  
  
Patient is at risk   
for sepsis. Monitor   
closely for s/s of   
sepsis.                                 Problem:Sepsis  
Goal:Patient/caregive  
r will be able to   
identify and report   
symptoms of sepsis                        
  
  
Completed                                 
  
                                          
   
                                                      
  
  
SPO2  
  
  
Description:  
  
  
Notify Dr. Ahuja   
if pulse ox is <92%   
at rest.                                Problem:Physician   
Specific Parameters  
Goal:Patient to   
maintain parameters   
within   
physician-specified   
ranges throughout   
certification period                      
  
  
Completed                                 
  
                                          
   
                                                      
  
  
Instruct on   
individual fall risk   
factors and   
strategies to prevent   
falls and injuries   
caused by falls.                        Problem:Risk for   
Falls  
Goal:Manage Risk for   
falls                                     
  
  
Completed                                 
  
                                          
  
  
PT: Patient instructed   
on  
  
  
Eliminating   
Environmental Hazards:   
Keep pathways clear,   
Keep pets out of   
pathways and Remove   
unsafe rugs  
  
  
Managing Impaired   
Functional Mobility:   
Use assistive   
device(s): front   
wheeled walker  
  
  
Managing Pain  
  
  
  
  
  
   
                                                      
  
  
Instruct on pain and   
instruct on   
strategies to control   
pain                                    Problem:Pain  
Goal:Manage Pain                          
  
  
Completed                                 
  
                                          
  
  
patient instructed on   
techniques to control   
pain including   
Pharmacological   
measures and   
Non-Pharmacological   
measures; rest and   
positioning/elevation.  
   
                                                      
  
  
Instruct on final   
discharge plan and   
deliver discharge   
instructions                            Problem:Discharge  
Goal:Manage discharge   
planning                                  
  
  
Completed                                 
  
                                          
  
  
Delivered Discharge   
plan: Discharge plan   
discussed with patient  
  
  
for plan for   
transition to: live at   
home with community   
assistance  
  
  
  
documented in this encounter  
[facility] Diagnostic Procedure Only 
  (Routine) - Closed  
  
                          Specialty    Diagnoses / Procedures Referred By Contac  
t Referred To Contact  
   
                                        [facility]            
  
  
Diagnoses  
  
  
Monoclonal gammopathy  
  
  
  
Procedures  
  
  
XR BONE SURVEY ROUTINE  
  
  
RADIOLOGIC EXAMINATION   
OSSEOUS SURVEY COMPL                      
  
  
Tello Ortega DO  
  
  
[address] [place] [address]  
  
  
Phone: [phone]  
  
  
Fax: [phone]                         
  
  
[facility]  
  
  
  
                    Referral ID Status    Reason    Start Date Expiration Date V  
isits   
Requested                               Visits   
Authorized  
   
                                [id number]        Closed            
  
  
Auto-Generate  
d Referral      2022        1               1  
  
  
  
  
Electronically signed by Tello Ortega DO at 2022 3:40 PM EST  
  
  
* Diagnostic Procedure Only (Routine) - Authorized  
  
                          Specialty    Diagnoses / Procedures Referred By Contac  
t Referred To Contact  
   
                                        [facility]            
  
  
Diagnoses  
  
  
Monoclonal gammopathy  
  
  
Macrocytic anemia  
  
  
Thrombocytopenia (HCC)  
  
  
  
Procedures  
  
  
US ABD RT UPPER QUADRANT  
  
  
US ABDOMINAL REAL TIME   
W/IMAGE LIMITED                           
  
  
Tello Ortega, DO  
  
  
[address] [place] [place] [address]  
  
  
Phone: [phone]  
  
  
Fax: [phone]                         
  
  
[facility]  
  
  
  
                          Referral ID  Status       Reason       Start   
Date                                    Expiration   
Date                                    Visits   
Requested                               Visits   
Authorized  
   
                                [id number]        Authorized        
  
  
Auto-Generat  
ed Referral     2022        1               1  
  
  
  
  
Electronically signed by Tello Ortega DO at 2022 3:29 PM EST  
  
  
[facility] for referral (narrative)* Outpatient Procedure (Routine) 
  - Authorized  
  
                          Specialty    Diagnoses / Procedures Referred By Contac  
t Referred To Contact  
   
                                                    [facility]   
[place]                                 
  
  
Diagnoses  
  
  
[place]  
  
  
  
Procedures  
  
  
EGD - THERAPEUTIC, EUS,   
OR TUBE INTERVENTIONS  
  
  
EGD BAND LIGATION   
ESOPHGEAL/GASTRIC   
VARICES                                   
  
  
Juanito Esparza MD  
  
  
[address] [place] [address]  
  
  
Phone: [phone]  
  
  
Fax: [phone]                         
  
  
[place] [address]  
  
  
  
                          Referral ID  Status       Reason       Start   
Date                                    Expiration   
Date                                    Visits   
Requested                               Visits   
Authorized  
   
                                [id number]        Authorized        
  
  
Auto-Generat  
ed Referral                             10/19/202  
3                   10/19/2024          1                   1  
  
  
  
  
Electronically signed by Juanito Esparza MD at 10/19/2023 2:42 PM EDT  
  
  
[facility] for referral (narrative)* Diagnostic Procedure Only 
  (Routine) - Closed  
  
                          Specialty    Diagnoses / Procedures Referred By Contac  
t Referred To Contact  
   
                                        [facility]            
  
  
Diagnoses  
  
  
Monoclonal gammopathy  
  
  
Macrocytic anemia  
  
  
Thrombocytopenia (HCC)  
  
  
  
Procedures  
  
  
US ABD RT UPPER QUADRANT  
  
  
US ABDOMINAL REAL TIME   
W/IMAGE LIMITED                           
  
  
Tello Ortega DO  
  
  
[address] [place] [address]  
  
  
Phone: [phone]  
  
  
Fax: [phone]                         
  
  
[facility]  
  
  
[place] [address]  
  
  
  
                    Referral ID Status    Reason    Start Date Expiration Date V  
isits   
Requested                               Visits   
Authorized  
   
                                [id number]        Closed            
  
  
Auto-Generate  
d Referral      2022        1               1  
  
  
  
  
Electronically signed by Tello Ortega DO at 2022 8:45 AM EST  
  
  
[facility]  
  
Summary Purpose  
  
  
                                                      
  
  
  
Family History  
No Family History Records FoundNo Family History Records FoundNo Family History 
Records FoundNo Family History Records FoundNo Family History Records FoundNo 
Family History Records Found  
  
Advance Directives  
No Advanced Directives Records FoundDocuments on File  
  
                          Type         Date Recorded Patient Representative Expl  
anation  
   
                          Advance Directive(s) 2020 9:27 AM                
   
                          Advance Directive(s) 2020 5:47 PM                
   
                          Advance Directive(s) 2018 11:22 AM                
  
                                Documents on File  
  
                          Type         Date Recorded Patient Representative Expl  
anation  
   
                          Advance Directive(s) 2020 9:27 AM                
   
                          Advance Directive(s) 2020 5:47 PM                
   
                          Advance Directive(s) 2018 11:22 AM                
  
                           Latest Code Status on File  
  
                          Code Status  Date Activated Date Inactivated Comments  
   
                          Full Code    2022 7:21 PM                
  
                           Latest Code Status on File  
  
                          Code Status  Date Activated Date Inactivated Comments  
   
                          Full Code    2022 7:21 PM                
  
                           Latest Code Status on File  
  
                          Code Status  Date Activated Date Inactivated Comments  
   
                          Full Code    2022 7:21 PM 2023 2:18 AM   
  
                           Latest Code Status on File  
  
                          Code Status  Date Activated Date Inactivated Comments  
   
                          Full Code    2022 7:21 PM 2023 2:18 AM   
  
                           Latest Code Status on File  
  
                          Code Status  Date Activated Date Inactivated Comments  
   
                          Full Code    2022 7:21 PM 2023 2:18 AM   
  
                           Latest Code Status on File  
  
                          Code Status  Date Activated Date Inactivated Comments  
   
                          Full Code    2022 7:21 PM 2023 2:18 AM   
  
  
  
Reason for Referral  
  
  
                          Specialty    Diagnoses / Procedures Referred By Contac  
t Referred To Contact  
   
                                        Gastroenterology      
  
  
Diagnoses  
  
  
Adenomatous polyp of colon,   
unspecified part of colon  
  
  
  
Procedures  
  
  
CONSULT TO GASTROENTEROLOGY               
  
  
Júnior Ahuja MD  
  
  
[address] [place] [address]  
  
  
Phone: [phone]  
  
  
Fax: [phone]                         
  
  
  
  
  
                          Referral ID  Status       Reason       Start   
Date                                    Expiration   
Date                                    Visits   
Requested                               Visits   
Authorized  
   
                                        [id number]            Ref Not   
Required                                  
  
  
PCP Requested   
Referral        2022       1               1  
  
  
  
                          Specialty    Diagnoses / Procedures Referred By Contac  
t Referred To Contact  
   
                                                              
  
  
Diagnoses  
  
  
Peripheral polyneuropathy  
  
  
Frequent falls  
  
  
Contusion of hip,   
unspecified laterality,   
subsequent encounter  
  
  
Muscular weakness  
  
  
Abnormality of gait  
  
  
  
Procedures  
  
  
CONSULT TO [facility]                            
  
  
Júnior Ahuja MD  
  
  
[address] [place] [address]  
  
  
Phone: [phone]  
  
  
Fax: [phone]                         
  
  
Home Care  
  
  
[address] [place] [address]  
  
  
Phone: [phone]  
  
  
  
                          Referral ID  Status       Reason       Start   
Date                                    Expiration   
Date                                    Visits   
Requested                               Visits   
Authorized  
   
                                [id number]        Authorized        
  
  
PCP Requested   
Referral        2022      1               1  
  
  
  
                          Specialty    Diagnoses / Procedures Referred By Contac  
t Referred To Contact  
   
                                                              
  
  
Diagnoses  
  
  
Frequent falls  
  
  
Peripheral polyneuropathy  
  
  
  
Procedures  
  
  
CONSULT TO NEUROLOGY                      
  
  
Júnior Ahuja MD  
  
  
[address] [place] [address]  
  
  
Phone: [phone]  
  
  
Fax: [phone]                         
  
  
  
  
  
                          Referral ID  Status       Reason       Start   
Date                                    Expiration   
Date                                    Visits   
Requested                               Visits   
Authorized  
   
                                        [id number]            Ref Not   
Required                                  
  
  
PCP Requested   
Referral        10/5/2022       10/3/2023       1               1  
  
  
  
                          Specialty    Diagnoses / Procedures Referred By Contac  
t Referred To Contact  
   
                                                    [facility]                               
  
  
Diagnoses  
  
  
Peripheral   
polyneuropathy  
  
  
Abnormality of gait  
  
  
  
Procedures  
  
  
CONSULT TO OCCUPATIONAL   
THER  
  
  
OCCUPATIONAL THERAPY   
EVAL HIGH COMPLEX 60   
MINS                                      
  
  
Júnior Ahuja MD  
  
  
[address] [place] [address]  
  
  
Phone: [phone]  
  
  
Fax: [phone]                         
  
  
[facility]   
[facility] [address] [place] [address]  
  
  
  
                          Referral ID  Status       Reason       Start   
Date                                    Expiration   
Date                                    Visits   
Requested                               Visits   
Authorized  
   
                                [id number]        Authorized        
  
  
PCP Requested   
Referral  
  
  
Auto-Generate  
d Referral                              10/17/202  
2                   10/17/2023          99                  99  
  
  
  
                          Specialty    Diagnoses / Procedures Referred By Contac  
t Referred To Contact  
   
                                        [facility]            
  
  
Diagnoses  
  
  
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   
ascites, unspecified hepatic   
cirrhosis type (HCC)  
  
  
Liver mass  
  
  
Splenomegaly  
  
  
  
Procedures  
  
  
MRI LIVER WO/W IVCON  
  
  
MRI ABDOMEN W/O & W/CONTRAST   
MATERIAL                                  
  
  
Maurizio Cochran PA-C  
  
  
[address] [place] [address]  
  
  
Phone: [phone]  
  
  
Fax: [phone]                         
  
  
[facility]  
  
  
  
                          Referral ID  Status       Reason       Start   
Date                                    Expiration   
Date                                    Visits   
Requested                               Visits   
Authorized  
   
                                        [id number]            Pending   
Review                                    
  
  
Auto-Generat  
ed Referral     2023       1               1  
  
  
  
                    Referral ID Status    Reason    Start Date Expiration Date V  
isits   
Requested                               Visits   
Authorized  
   
                                [id number]        Closed            
  
  
Auto-Generate  
d Referral      2023       1               1  
  
  
  
                          Specialty    Diagnoses / Procedures Referred By Contac  
t Referred To Contact  
   
                                                              
  
  
Diagnoses  
  
  
Hepatocellular carcinoma   
(HCC)  
  
  
  
Procedures  
  
  
CT SIM PLANNING RADIATION   
ONCOLOGY  
  
  
THER RAD SIMULAJ-AIDED FIELD   
SETTING COMPLEX                           
  
  
Marin Fish MD  
  
  
[address] [place] [address]  
  
  
Phone: [phone]  
  
  
Fax: [phone]                         
  
  
  
  
  
                          Referral ID  Status       Reason       Start   
Date                                    Expiration   
Date                                    Visits   
Requested                               Visits   
Authorized  
   
                                        [id number]            Pending   
Review                                    
  
  
PCP Requested   
Referral        2023       1               1  
  
  
  
                          Specialty    Diagnoses / Procedures Referred By Contac  
t Referred To Contact  
   
                                        [facility]            
  
  
Diagnoses  
  
  
Liver disease  
  
  
  
Procedures  
  
  
MRI LIVER WO/W IVCON  
  
  
MRI ABDOMEN W/O &   
W/CONTRAST MATERIAL                       
  
  
Marin Fish MD  
  
  
[address] [place] [address]  
  
  
Phone: [phone]  
  
  
Fax: [phone]                         
  
  
[facility]  
  
  
[place] [address]  
  
  
  
                    Referral ID Status    Reason    Start Date Expiration Date V  
isits   
Requested                               Visits   
Authorized  
   
                                [id number]        Closed            
  
  
Auto-Generate  
d Referral      2023       1               1  
  
  
  
Medications Administered Section  
     Inactive Administered Medications - up to 3 most recent administrations  
  
                    Medication Order MAR Action Action Date Dose      Rate        
Site  
   
                                                      
  
  
iron sucrose 200 mg in   
NaCl 0.9% 100ml   
(VENOFER)  
  
  
200 mg, INTRAVENOUS, at   
400 mL/hr, Administer   
over 15 Minutes, ONCE, 1   
dose, On 23 at   
1500, Please conduct a   
30 minute post dose   
observation.                            New   
Bag/Syringe/Bottle                      2023 3:11 PM   
EST                 200 mg              400 mL/hr             
   
                                                               
  
     Inactive Administered Medications - up to 3 most recent administrations  
  
                    Medication Order MAR Action Action Date Dose      Rate        
Site  
   
                                                      
  
  
iron sucrose 200 mg in   
NaCl 0.9% 100ml   
(VENOFER)  
  
  
200 mg, INTRAVENOUS, at   
400 mL/hr, Administer   
over 15 Minutes, ONCE, 1   
dose, On 23 at   
1500, Please conduct a   
30 minute post dose   
observation.                            New   
Bag/Syringe/Bottle                      2023 3:10 PM   
EST                 200 mg              400 mL/hr             
   
                                                               
  
     Inactive Administered Medications - up to 3 most recent administrations  
  
                    Medication Order MAR Action Action Date Dose      Rate        
Site  
   
                                                      
  
  
iron sucrose 200 mg in   
NaCl 0.9% 100ml   
(VENOFER)  
  
  
200 mg, INTRAVENOUS, at   
400 mL/hr, Administer   
over 15 Minutes, ONCE, 1   
dose, On 23 at   
1530, Please conduct a   
30 minute post dose   
observation.                            New   
Bag/Syringe/Bottle                      2023 3:45 PM   
EST                 200 mg              400 mL/hr             
   
                                                               
  
     Inactive Administered Medications - up to 3 most recent administrations  
  
                    Medication Order MAR Action Action Date Dose      Rate        
Site  
   
                                                      
  
  
iron sucrose 200 mg in   
NaCl 0.9% 100ml   
(VENOFER)  
  
  
200 mg, INTRAVENOUS, at   
400 mL/hr, Administer   
over 15 Minutes, ONCE, 1   
dose, On 23 at   
1530, Please conduct a   
30 minute post dose   
observation.                            New   
Bag/Syringe/Bottle                      2023 3:14 PM   
EST                 200 mg              400 mL/hr             
   
                                                               
  
     Inactive Administered Medications - up to 3 most recent administrations  
  
                    Medication Order MAR Action Action Date Dose      Rate        
Site  
   
                                                      
  
  
iron sucrose 200 mg in   
NaCl 0.9% 100ml   
(VENOFER)  
  
  
200 mg, INTRAVENOUS, at   
400 mL/hr, Administer   
over 15 Minutes, ONCE, 1   
dose, On 23 at   
1530, Please conduct a   
30 minute post dose   
observation.                            New   
Bag/Syringe/Bottle                      2023 3:14 PM   
EST                 200 mg              400 mL/hr             
   
                                                               
  
     Inactive Administered Medications - up to 3 most recent administrations  
  
                    Medication Order MAR Action Action Date Dose      Rate        
Site  
   
                                                      
  
  
iron sucrose 200 mg in   
NaCl 0.9% 100ml   
(VENOFER)  
  
  
200 mg, INTRAVENOUS, at   
400 mL/hr, Administer   
over 15 Minutes, ONCE, 1   
dose, On Fri 2/10/23 at   
1500, Please conduct a   
30 minute post dose   
observation.                            New   
Bag/Syringe/Bottle                      02/10/2023 2:46 PM   
EST                 200 mg              400 mL/hr             
   
                                                               
  
     Inactive Administered Medications - up to 3 most recent administrations  
  
                    Medication Order MAR Action Action Date Dose      Rate        
Site  
   
                                                      
  
  
iron sucrose 200 mg in   
NaCl 0.9% 100ml   
(VENOFER)  
  
  
200 mg, INTRAVENOUS, at   
400 mL/hr, Administer   
over 15 Minutes, ONCE, 1   
dose, On 23 at   
1600, Please conduct a   
30 minute post dose   
observation.                            New   
Bag/Syringe/Bottle                      2023 3:50 PM   
EST                 200 mg              400 mL/hr             
   
                                                               
  
     Inactive Administered Medications - up to 3 most recent administrations  
  
                    Medication Order MAR Action Action Date Dose      Rate        
Site  
   
                                                      
  
  
iron sucrose 200 mg in   
NaCl 0.9% 100ml   
(VENOFER)  
  
  
200 mg, INTRAVENOUS, at   
400 mL/hr, Administer   
over 15 Minutes, ONCE, 1   
dose, On Wed 2/15/23 at   
1530, Please conduct a   
30 minute post dose   
observation.                            New   
Bag/Syringe/Bottle                      02/15/2023 3:24 PM   
EST                 200 mg              400 mL/hr             
   
                                                               
  
     Inactive Administered Medications - up to 3 most recent administrations  
  
                    Medication Order MAR Action Action Date Dose      Rate        
Site  
   
                                                      
  
  
iron sucrose 200 mg in   
NaCl 0.9% 100ml   
(VENOFER)  
  
  
200 mg, INTRAVENOUS, at   
400 mL/hr, Administer   
over 15 Minutes, ONCE, 1   
dose, On 23 at   
1000, Please conduct a   
30 minute post dose   
observation.                            New   
Bag/Syringe/Bottle                      2023 9:44 AM   
EDT                 200 mg              400 mL/hr             
   
                                                               
  
     Inactive Administered Medications - up to 3 most recent administrations  
  
                    Medication Order MAR Action Action Date Dose      Rate        
Site  
   
                                                      
  
  
iron sucrose 200 mg in   
NaCl 0.9% 100ml   
(VENOFER)  
  
  
200 mg, INTRAVENOUS, at   
400 mL/hr, Administer   
over 15 Minutes, ONCE, 1   
dose, On 23 at   
1000, Please conduct a   
30 minute post dose   
observation.                            New   
Bag/Syringe/Bottle                      2023 10:10 AM   
EDT                 200 mg              400 mL/hr             
   
                                                               
  
     Inactive Administered Medications - up to 3 most recent administrations  
  
                    Medication Order MAR Action Action Date Dose      Rate        
Site  
   
                                                      
  
  
iron sucrose 200 mg in   
NaCl 0.9% 100ml   
(VENOFER)  
  
  
200 mg, INTRAVENOUS, at   
400 mL/hr, Administer   
over 15 Minutes, ONCE, 1   
dose, On 23 at   
1000, Please conduct a   
30 minute post dose   
observation.                            New   
Bag/Syringe/Bottle                      2023 10:06 AM   
EDT                 200 mg              400 mL/hr             
   
                                                               
  
     Inactive Administered Medications - up to 3 most recent administrations  
  
                    Medication Order MAR Action Action Date Dose      Rate        
Site  
   
                                                      
  
  
iron sucrose 200 mg in   
NaCl 0.9% 100ml   
(VENOFER)  
  
  
200 mg, INTRAVENOUS, at   
400 mL/hr, Administer   
over 15 Minutes, ONCE, 1   
dose, On 23 at   
1200, Please conduct a   
30 minute post dose   
observation.                            New   
Bag/Syringe/Bottle                      2023 11:55 AM   
EDT                 200 mg              400 mL/hr             
   
                                                               
  
     Inactive Administered Medications - up to 3 most recent administrations  
  
                    Medication Order MAR Action Action Date Dose      Rate        
Site  
   
                                                      
  
  
iron sucrose 200 mg in   
NaCl 0.9% 100ml   
(VENOFER)  
  
  
200 mg, INTRAVENOUS, at   
400 mL/hr, Administer   
over 15 Minutes, ONCE, 1   
dose, On 23 at   
0800, Please conduct a   
30 minute post dose   
observation.                            New   
Bag/Syringe/Bottle                      2023 9:55 AM   
EDT                 200 mg              400 mL/hr             
   
                                                               
  
  
  
Additional Source Comments  
  
  
  
                                                    PREGNANCY (unrecognized sect  
ion and content)  
   
                                                    No Pregnancy Status Records   
FoundNo Pregnancy Status Records FoundNo Pregnancy   
Status   
Records FoundNo Pregnancy Status Records FoundNo Pregnancy Status Records 
FoundNo   
Pregnancy Status Records Found  
  
  
  
  
                                                    INFORMATION SOURCE (unrecogn  
ized section and content)  
   
                                          
  
  
  
                                          
   
                                          
  
  
  
                                DATE CREATED    AUTHOR          AUTHOR'S ORGANIZ  
ATION  
   
                                2018                      [facility]  
  
  
  
                                DATE CREATED    AUTHOR          AUTHOR'S ORGANIZ  
ATION  
   
                                2018                      [facility]  
  
  
  
                                DATE CREATED    AUTHOR          AUTHOR'S ORGANIZ  
ATION  
   
                                2023                      [facility]  
tem [facility]  
  
  
  
                                DATE CREATED    AUTHOR          AUTHOR'S ORGANIZ  
ATION  
   
                                2023                      [facility]  
nter  
  
  
  
                                DATE CREATED    AUTHOR          AUTHOR'S ORGANIZ  
ATION  
   
                                2023                      [facility]  
  
  
  
  
  
                                                    Source Comments (unrecognize  
d section and content)  
   
                                                    In the event this informatio  
n is protected by the Federal Confidentiality of   
Alcohol   
and Drug Abuse Patient Records regulations: This information has been disclosed 
to   
you from records protected by Federal confidentiality rules (42 CFR Part 2). The
   
Federal rules prohibit you from making any further disclosure of this 
information   
unless further disclosure is expressly permitted by the written consent of the 
person   
to whom it pertains or as otherwise permitted by 42 CFR Part 2. A general   
authorization for the release of medical or other information is NOT sufficient 
for   
this purpose. The Federal rules restrict any use of the information to 
criminally   
investigate or prosecute any alcohol or drug abuse patient.[facility] 
the   
event this information is protected by the Federal Confidentiality of Alcohol 
and   
Drug Abuse Patient Records regulations: This information has been disclosed to 
you   
from records protected by Federal confidentiality rules (42 CFR Part 2). The 
Federal   
rules prohibit you from making any further disclosure of this information unless
   
further disclosure is expressly permitted by the written consent of the person 
to   
whom it pertains or as otherwise permitted by 42 CFR Part 2. A general 
authorization   
for the release of medical or other information is NOT sufficient for this 
purpose.   
The Federal rules restrict any use of the information to criminally investigate 
or   
prosecute any alcohol or drug abuse patient.[facility] the event this   
information is protected by the Federal Confidentiality of Alcohol and Drug 
Abuse   
Patient Records regulations: This information has been disclosed to you from 
records   
protected by Federal confidentiality rules (42 CFR Part 2). The Federal rules   
prohibit you from making any further disclosure of this information unless 
further   
disclosure is expressly permitted by the written consent of the person to whom 
it   
pertains or as otherwise permitted by 42 CFR Part 2. A general authorization for
 the   
release of medical or other information is NOT sufficient for this purpose. The   
Federal rules restrict any use of the information to criminally investigate or   
prosecute any alcohol or drug abuse patient.[facility] CampusIn the event this   
information is protected by the Federal Confidentiality of Alcohol and Drug 
Abuse   
Patient Records regulations: This information has been disclosed to you from 
records   
protected by Federal confidentiality rules (42 CFR Part 2). The Federal rules   
prohibit you from making any further disclosure of this information unless 
further   
disclosure is expressly permitted by the written consent of the person to whom 
it   
pertains or as otherwise permitted by 42 CFR Part 2. A general authorization for
 the   
release of medical or other information is NOT sufficient for this purpose. The   
Federal rules restrict any use of the information to criminally investigate or   
prosecute any alcohol or drug abuse patient.[facility] CampusIn the event this   
information is protected by the Federal Confidentiality of Alcohol and Drug 
Abuse   
Patient Records regulations: This information has been disclosed to you from 
records   
protected by Federal confidentiality rules (42 CFR Part 2). The Federal rules   
prohibit you from making any further disclosure of this information unless 
further   
disclosure is expressly permitted by the written consent of the person to whom 
it   
pertains or as otherwise permitted by 42 CFR Part 2. A general authorization for
 the   
release of medical or other information is NOT sufficient for this purpose. The   
Federal rules restrict any use of the information to criminally investigate or   
prosecute any alcohol or drug abuse patient.[facility] CampusIn the event this   
information is protected by the Federal Confidentiality of Alcohol and Drug 
Abuse   
Patient Records regulations: This information has been disclosed to you from 
records   
protected by Federal confidentiality rules (42 CFR Part 2). The Federal rules   
prohibit you from making any further disclosure of this information unless 
further   
disclosure is expressly permitted by the written consent of the person to whom 
it   
pertains or as otherwise permitted by 42 CFR Part 2. A general authorization for
 the   
release of medical or other information is NOT sufficient for this purpose. The   
Federal rules restrict any use of the information to criminally investigate or   
prosecute any alcohol or drug abuse patient.[facility] CampusIn the event this   
information is protected by the Federal Confidentiality of Alcohol and Drug 
Abuse   
Patient Records regulations: This information has been disclosed to you from 
records   
protected by Federal confidentiality rules (42 CFR Part 2). The Federal rules   
prohibit you from making any further disclosure of this information unless 
further   
disclosure is expressly permitted by the written consent of the person to whom 
it   
pertains or as otherwise permitted by 42 CFR Part 2. A general authorization for
 the   
release of medical or other information is NOT sufficient for this purpose. The   
Federal rules restrict any use of the information to criminally investigate or   
prosecute any alcohol or drug abuse patient.[facility] CampusIn the event this   
information is protected by the Federal Confidentiality of Alcohol and Drug 
Abuse   
Patient Records regulations: This information has been disclosed to you from 
records   
protected by Federal confidentiality rules (42 CFR Part 2). The Federal rules   
prohibit you from making any further disclosure of this information unless 
further   
disclosure is expressly permitted by the written consent of the person to whom 
it   
pertains or as otherwise permitted by 42 CFR Part 2. A general authorization for
 the   
release of medical or other information is NOT sufficient for this purpose. The   
Federal rules restrict any use of the information to criminally investigate or   
prosecute any alcohol or drug abuse patient.[facility] CampusIn the event this   
information is protected by the Federal Confidentiality of Alcohol and Drug 
Abuse   
Patient Records regulations: This information has been disclosed to you from 
records   
protected by Federal confidentiality rules (42 CFR Part 2). The Federal rules   
prohibit you from making any further disclosure of this information unless 
further   
disclosure is expressly permitted by the written consent of the person to whom 
it   
pertains or as otherwise permitted by 42 CFR Part 2. A general authorization for
 the   
release of medical or other information is NOT sufficient for this purpose. The   
Federal rules restrict any use of the information to criminally investigate or   
prosecute any alcohol or drug abuse patient.[facility] CampusIn the event this   
information is protected by the Federal Confidentiality of Alcohol and Drug 
Abuse   
Patient Records regulations: This information has been disclosed to you from 
records   
protected by Federal confidentiality rules (42 CFR Part 2). The Federal rules   
prohibit you from making any further disclosure of this information unless 
further   
disclosure is expressly permitted by the written consent of the person to whom 
it   
pertains or as otherwise permitted by 42 CFR Part 2. A general authorization for
 the   
release of medical or other information is NOT sufficient for this purpose. The   
Federal rules restrict any use of the information to criminally investigate or   
prosecute any alcohol or drug abuse patient.[facility] CampusIn the event this   
information is protected by the Federal Confidentiality of Alcohol and Drug 
Abuse   
Patient Records regulations: This information has been disclosed to you from 
records   
protected by Federal confidentiality rules (42 CFR Part 2). The Federal rules   
prohibit you from making any further disclosure of this information unless 
further   
disclosure is expressly permitted by the written consent of the person to whom 
it   
pertains or as otherwise permitted by 42 CFR Part 2. A general authorization for
 the   
release of medical or other information is NOT sufficient for this purpose. The   
Federal rules restrict any use of the information to criminally investigate or   
prosecute any alcohol or drug abuse patient.[facility] CampusIn the event this   
information is protected by the Federal Confidentiality of Alcohol and Drug 
Abuse   
Patient Records regulations: This information has been disclosed to you from 
records   
protected by Federal confidentiality rules (42 CFR Part 2). The Federal rules   
prohibit you from making any further disclosure of this information unless 
further   
disclosure is expressly permitted by the written consent of the person to whom 
it   
pertains or as otherwise permitted by 42 CFR Part 2. A general authorization for
 the   
release of medical or other information is NOT sufficient for this purpose. The   
Federal rules restrict any use of the information to criminally investigate or   
prosecute any alcohol or drug abuse patient.[facility] CampusIn the event this   
information is protected by the Federal Confidentiality of Alcohol and Drug 
Abuse   
Patient Records regulations: This information has been disclosed to you from 
records   
protected by Federal confidentiality rules (42 CFR Part 2). The Federal rules   
prohibit you from making any further disclosure of this information unless 
further   
disclosure is expressly permitted by the written consent of the person to whom 
it   
pertains or as otherwise permitted by 42 CFR Part 2. A general authorization for
 the   
release of medical or other information is NOT sufficient for this purpose. The   
Federal rules restrict any use of the information to criminally investigate or   
prosecute any alcohol or drug abuse patient.[facility] CampusIn the event this   
information is protected by the Federal Confidentiality of Alcohol and Drug 
Abuse   
Patient Records regulations: This information has been disclosed to you from 
records   
protected by Federal confidentiality rules (42 CFR Part 2). The Federal rules   
prohibit you from making any further disclosure of this information unless 
further   
disclosure is expressly permitted by the written consent of the person to whom 
it   
pertains or as otherwise permitted by 42 CFR Part 2. A general authorization for
 the   
release of medical or other information is NOT sufficient for this purpose. The   
Federal rules restrict any use of the information to criminally investigate or   
prosecute any alcohol or drug abuse patient.[facility] CampusIn the event this   
information is protected by the Federal Confidentiality of Alcohol and Drug 
Abuse   
Patient Records regulations: This information has been disclosed to you from 
records   
protected by Federal confidentiality rules (42 CFR Part 2). The Federal rules   
prohibit you from making any further disclosure of this information unless 
further   
disclosure is expressly permitted by the written consent of the person to whom 
it   
pertains or as otherwise permitted by 42 CFR Part 2. A general authorization for
 the   
release of medical or other information is NOT sufficient for this purpose. The   
Federal rules restrict any use of the information to criminally investigate or   
prosecute any alcohol or drug abuse patient.[facility] the event this   
information is protected by the Federal Confidentiality of Alcohol and Drug 
Abuse   
Patient Records regulations: This information has been disclosed to you from 
records   
protected by Federal confidentiality rules (42 CFR Part 2). The Federal rules   
prohibit you from making any further disclosure of this information unless 
further   
disclosure is expressly permitted by the written consent of the person to whom 
it   
pertains or as otherwise permitted by 42 CFR Part 2. A general authorization for
 the   
release of medical or other information is NOT sufficient for this purpose. The   
Federal rules restrict any use of the information to criminally investigate or   
prosecute any alcohol or drug abuse patient.[facility] CampusIn the event this   
information is protected by the Federal Confidentiality of Alcohol and Drug 
Abuse   
Patient Records regulations: This information has been disclosed to you from 
records   
protected by Federal confidentiality rules (42 CFR Part 2). The Federal rules   
prohibit you from making any further disclosure of this information unless 
further   
disclosure is expressly permitted by the written consent of the person to whom 
it   
pertains or as otherwise permitted by 42 CFR Part 2. A general authorization for
 the   
release of medical or other information is NOT sufficient for this purpose. The   
Federal rules restrict any use of the information to criminally investigate or   
prosecute any alcohol or drug abuse patient.[facility] CampusIn the event this   
information is protected by the Federal Confidentiality of Alcohol and Drug 
Abuse   
Patient Records regulations: This information has been disclosed to you from 
records   
protected by Federal confidentiality rules (42 CFR Part 2). The Federal rules   
prohibit you from making any further disclosure of this information unless 
further   
disclosure is expressly permitted by the written consent of the person to whom 
it   
pertains or as otherwise permitted by 42 CFR Part 2. A general authorization for
 the   
release of medical or other information is NOT sufficient for this purpose. The   
Federal rules restrict any use of the information to criminally investigate or   
prosecute any alcohol or drug abuse patient.[facility] CampusIn the event this   
information is protected by the Federal Confidentiality of Alcohol and Drug 
Abuse   
Patient Records regulations: This information has been disclosed to you from 
records   
protected by Federal confidentiality rules (42 CFR Part 2). The Federal rules   
prohibit you from making any further disclosure of this information unless 
further   
disclosure is expressly permitted by the written consent of the person to whom 
it   
pertains or as otherwise permitted by 42 CFR Part 2. A general authorization for
 the   
release of medical or other information is NOT sufficient for this purpose. The   
Federal rules restrict any use of the information to criminally investigate or   
prosecute any alcohol or drug abuse patient.[facility] CampusIn the event this   
information is protected by the Federal Confidentiality of Alcohol and Drug 
Abuse   
Patient Records regulations: This information has been disclosed to you from 
records   
protected by Federal confidentiality rules (42 CFR Part 2). The Federal rules   
prohibit you from making any further disclosure of this information unless 
further   
disclosure is expressly permitted by the written consent of the person to whom 
it   
pertains or as otherwise permitted by 42 CFR Part 2. A general authorization for
 the   
release of medical or other information is NOT sufficient for this purpose. The   
Federal rules restrict any use of the information to criminally investigate or   
prosecute any alcohol or drug abuse patient.[facility] CampusIn the event this   
information is protected by the Federal Confidentiality of Alcohol and Drug 
Abuse   
Patient Records regulations: This information has been disclosed to you from 
records   
protected by Federal confidentiality rules (42 CFR Part 2). The Federal rules   
prohibit you from making any further disclosure of this information unless 
further   
disclosure is expressly permitted by the written consent of the person to whom 
it   
pertains or as otherwise permitted by 42 CFR Part 2. A general authorization for
 the   
release of medical or other information is NOT sufficient for this purpose. The   
Federal rules restrict any use of the information to criminally investigate or   
prosecute any alcohol or drug abuse patient.[facility] CampusIn the event this   
information is protected by the Federal Confidentiality of Alcohol and Drug 
Abuse   
Patient Records regulations: This information has been disclosed to you from 
records   
protected by Federal confidentiality rules (42 CFR Part 2). The Federal rules   
prohibit you from making any further disclosure of this information unless 
further   
disclosure is expressly permitted by the written consent of the person to whom 
it   
pertains or as otherwise permitted by 42 CFR Part 2. A general authorization for
 the   
release of medical or other information is NOT sufficient for this purpose. The   
Federal rules restrict any use of the information to criminally investigate or   
prosecute any alcohol or drug abuse patient.[facility] CampusIn the event this   
information is protected by the Federal Confidentiality of Alcohol and Drug 
Abuse   
Patient Records regulations: This information has been disclosed to you from 
records   
protected by Federal confidentiality rules (42 CFR Part 2). The Federal rules   
prohibit you from making any further disclosure of this information unless 
further   
disclosure is expressly permitted by the written consent of the person to whom 
it   
pertains or as otherwise permitted by 42 CFR Part 2. A general authorization for
 the   
release of medical or other information is NOT sufficient for this purpose. The   
Federal rules restrict any use of the information to criminally investigate or   
prosecute any alcohol or drug abuse patient.[facility] CampusIn the event this   
information is protected by the Federal Confidentiality of Alcohol and Drug 
Abuse   
Patient Records regulations: This information has been disclosed to you from 
records   
protected by Federal confidentiality rules (42 CFR Part 2). The Federal rules   
prohibit you from making any further disclosure of this information unless 
further   
disclosure is expressly permitted by the written consent of the person to whom 
it   
pertains or as otherwise permitted by 42 CFR Part 2. A general authorization for
 the   
release of medical or other information is NOT sufficient for this purpose. The   
Federal rules restrict any use of the information to criminally investigate or   
prosecute any alcohol or drug abuse patient.[facility] CampusIn the event this   
information is protected by the Federal Confidentiality of Alcohol and Drug 
Abuse   
Patient Records regulations: This information has been disclosed to you from 
records   
protected by Federal confidentiality rules (42 CFR Part 2). The Federal rules   
prohibit you from making any further disclosure of this information unless 
further   
disclosure is expressly permitted by the written consent of the person to whom 
it   
pertains or as otherwise permitted by 42 CFR Part 2. A general authorization for
 the   
release of medical or other information is NOT sufficient for this purpose. The   
Federal rules restrict any use of the information to criminally investigate or   
prosecute any alcohol or drug abuse patient.[facility] CampusIn the event this   
information is protected by the Federal Confidentiality of Alcohol and Drug 
Abuse   
Patient Records regulations: This information has been disclosed to you from 
records   
protected by Federal confidentiality rules (42 CFR Part 2). The Federal rules   
prohibit you from making any further disclosure of this information unless 
further   
disclosure is expressly permitted by the written consent of the person to whom 
it   
pertains or as otherwise permitted by 42 CFR Part 2. A general authorization for
 the   
release of medical or other information is NOT sufficient for this purpose. The   
Federal rules restrict any use of the information to criminally investigate or   
prosecute any alcohol or drug abuse patient.[facility] CampusIn the event this   
information is protected by the Federal Confidentiality of Alcohol and Drug 
Abuse   
Patient Records regulations: This information has been disclosed to you from 
records   
protected by Federal confidentiality rules (42 CFR Part 2). The Federal rules   
prohibit you from making any further disclosure of this information unless 
further   
disclosure is expressly permitted by the written consent of the person to whom 
it   
pertains or as otherwise permitted by 42 CFR Part 2. A general authorization for
 the   
release of medical or other information is NOT sufficient for this purpose. The   
Federal rules restrict any use of the information to criminally investigate or   
prosecute any alcohol or drug abuse patient.[facility] CampusIn the event this   
information is protected by the Federal Confidentiality of Alcohol and Drug 
Abuse   
Patient Records regulations: This information has been disclosed to you from 
records   
protected by Federal confidentiality rules (42 CFR Part 2). The Federal rules   
prohibit you from making any further disclosure of this information unless 
further   
disclosure is expressly permitted by the written consent of the person to whom 
it   
pertains or as otherwise permitted by 42 CFR Part 2. A general authorization for
 the   
release of medical or other information is NOT sufficient for this purpose. The   
Federal rules restrict any use of the information to criminally investigate or   
prosecute any alcohol or drug abuse patient.[facility] CampusIn the event this   
information is protected by the Federal Confidentiality of Alcohol and Drug 
Abuse   
Patient Records regulations: This information has been disclosed to you from 
records   
protected by Federal confidentiality rules (42 CFR Part 2). The Federal rules   
prohibit you from making any further disclosure of this information unless 
further   
disclosure is expressly permitted by the written consent of the person to whom 
it   
pertains or as otherwise permitted by 42 CFR Part 2. A general authorization for
 the   
release of medical or other information is NOT sufficient for this purpose. The   
Federal rules restrict any use of the information to criminally investigate or   
prosecute any alcohol or drug abuse patient.[facility] CampusIn the event this   
information is protected by the Federal Confidentiality of Alcohol and Drug 
Abuse   
Patient Records regulations: This information has been disclosed to you from 
records   
protected by Federal confidentiality rules (42 CFR Part 2). The Federal rules   
prohibit you from making any further disclosure of this information unless 
further   
disclosure is expressly permitted by the written consent of the person to whom 
it   
pertains or as otherwise permitted by 42 CFR Part 2. A general authorization for
 the   
release of medical or other information is NOT sufficient for this purpose. The   
Federal rules restrict any use of the information to criminally investigate or   
prosecute any alcohol or drug abuse patient.[facility] CampusIn the event this   
information is protected by the Federal Confidentiality of Alcohol and Drug 
Abuse   
Patient Records regulations: This information has been disclosed to you from 
records   
protected by Federal confidentiality rules (42 CFR Part 2). The Federal rules   
prohibit you from making any further disclosure of this information unless 
further   
disclosure is expressly permitted by the written consent of the person to whom 
it   
pertains or as otherwise permitted by 42 CFR Part 2. A general authorization for
 the   
release of medical or other information is NOT sufficient for this purpose. The   
Federal rules restrict any use of the information to criminally investigate or   
prosecute any alcohol or drug abuse patient.[facility] CampusIn the event this   
information is protected by the Federal Confidentiality of Alcohol and Drug 
Abuse   
Patient Records regulations: This information has been disclosed to you from 
records   
protected by Federal confidentiality rules (42 CFR Part 2). The Federal rules   
prohibit you from making any further disclosure of this information unless 
further   
disclosure is expressly permitted by the written consent of the person to whom 
it   
pertains or as otherwise permitted by 42 CFR Part 2. A general authorization for
 the   
release of medical or other information is NOT sufficient for this purpose. The   
Federal rules restrict any use of the information to criminally investigate or   
prosecute any alcohol or drug abuse patient.[facility] CampusIn the event this   
information is protected by the Federal Confidentiality of Alcohol and Drug 
Abuse   
Patient Records regulations: This information has been disclosed to you from 
records   
protected by Federal confidentiality rules (42 CFR Part 2). The Federal rules   
prohibit you from making any further disclosure of this information unless 
further   
disclosure is expressly permitted by the written consent of the person to whom 
it   
pertains or as otherwise permitted by 42 CFR Part 2. A general authorization for
 the   
release of medical or other information is NOT sufficient for this purpose. The   
Federal rules restrict any use of the information to criminally investigate or   
prosecute any alcohol or drug abuse patient.[facility] CampusIn the event this   
information is protected by the Federal Confidentiality of Alcohol and Drug 
Abuse   
Patient Records regulations: This information has been disclosed to you from 
records   
protected by Federal confidentiality rules (42 CFR Part 2). The Federal rules   
prohibit you from making any further disclosure of this information unless 
further   
disclosure is expressly permitted by the written consent of the person to whom 
it   
pertains or as otherwise permitted by 42 CFR Part 2. A general authorization for
 the   
release of medical or other information is NOT sufficient for this purpose. The   
Federal rules restrict any use of the information to criminally investigate or   
prosecute any alcohol or drug abuse patient.[facility] CampusIn the event this   
information is protected by the Federal Confidentiality of Alcohol and Drug 
Abuse   
Patient Records regulations: This information has been disclosed to you from 
records   
protected by Federal confidentiality rules (42 CFR Part 2). The Federal rules   
prohibit you from making any further disclosure of this information unless 
further   
disclosure is expressly permitted by the written consent of the person to whom 
it   
pertains or as otherwise permitted by 42 CFR Part 2. A general authorization for
 the   
release of medical or other information is NOT sufficient for this purpose. The   
Federal rules restrict any use of the information to criminally investigate or   
prosecute any alcohol or drug abuse patient.[facility] CampusIn the event this   
information is protected by the Federal Confidentiality of Alcohol and Drug 
Abuse   
Patient Records regulations: This information has been disclosed to you from 
records   
protected by Federal confidentiality rules (42 CFR Part 2). The Federal rules   
prohibit you from making any further disclosure of this information unless 
further   
disclosure is expressly permitted by the written consent of the person to whom 
it   
pertains or as otherwise permitted by 42 CFR Part 2. A general authorization for
 the   
release of medical or other information is NOT sufficient for this purpose. The   
Federal rules restrict any use of the information to criminally investigate or   
prosecute any alcohol or drug abuse patient.[facility] CampusIn the event this   
information is protected by the Federal Confidentiality of Alcohol and Drug 
Abuse   
Patient Records regulations: This information has been disclosed to you from 
records   
protected by Federal confidentiality rules (42 CFR Part 2). The Federal rules   
prohibit you from making any further disclosure of this information unless 
further   
disclosure is expressly permitted by the written consent of the person to whom 
it   
pertains or as otherwise permitted by 42 CFR Part 2. A general authorization for
 the   
release of medical or other information is NOT sufficient for this purpose. The   
Federal rules restrict any use of the information to criminally investigate or   
prosecute any alcohol or drug abuse patient.[facility] CampusIn the event this   
information is protected by the Federal Confidentiality of Alcohol and Drug 
Abuse   
Patient Records regulations: This information has been disclosed to you from 
records   
protected by Federal confidentiality rules (42 CFR Part 2). The Federal rules   
prohibit you from making any further disclosure of this information unless 
further   
disclosure is expressly permitted by the written consent of the person to whom 
it   
pertains or as otherwise permitted by 42 CFR Part 2. A general authorization for
 the   
release of medical or other information is NOT sufficient for this purpose. The   
Federal rules restrict any use of the information to criminally investigate or   
prosecute any alcohol or drug abuse patient.[facility] CampusIn the event this   
information is protected by the Federal Confidentiality of Alcohol and Drug 
Abuse   
Patient Records regulations: This information has been disclosed to you from 
records   
protected by Federal confidentiality rules (42 CFR Part 2). The Federal rules   
prohibit you from making any further disclosure of this information unless 
further   
disclosure is expressly permitted by the written consent of the person to whom 
it   
pertains or as otherwise permitted by 42 CFR Part 2. A general authorization for
 the   
release of medical or other information is NOT sufficient for this purpose. The   
Federal rules restrict any use of the information to criminally investigate or   
prosecute any alcohol or drug abuse patient.[facility] CampusIn the event this   
information is protected by the Federal Confidentiality of Alcohol and Drug 
Abuse   
Patient Records regulations: This information has been disclosed to you from 
records   
protected by Federal confidentiality rules (42 CFR Part 2). The Federal rules   
prohibit you from making any further disclosure of this information unless 
further   
disclosure is expressly permitted by the written consent of the person to whom 
it   
pertains or as otherwise permitted by 42 CFR Part 2. A general authorization for
 the   
release of medical or other information is NOT sufficient for this purpose. The   
Federal rules restrict any use of the information to criminally investigate or   
prosecute any alcohol or drug abuse patient.[facility] CampusIn the event this   
information is protected by the Federal Confidentiality of Alcohol and Drug 
Abuse   
Patient Records regulations: This information has been disclosed to you from 
records   
protected by Federal confidentiality rules (42 CFR Part 2). The Federal rules   
prohibit you from making any further disclosure of this information unless 
further   
disclosure is expressly permitted by the written consent of the person to whom 
it   
pertains or as otherwise permitted by 42 CFR Part 2. A general authorization for
 the   
release of medical or other information is NOT sufficient for this purpose. The   
Federal rules restrict any use of the information to criminally investigate or   
prosecute any alcohol or drug abuse patient.[facility] CampusIn the event this   
information is protected by the Federal Confidentiality of Alcohol and Drug 
Abuse   
Patient Records regulations: This information has been disclosed to you from 
records   
protected by Federal confidentiality rules (42 CFR Part 2). The Federal rules   
prohibit you from making any further disclosure of this information unless 
further   
disclosure is expressly permitted by the written consent of the person to whom 
it   
pertains or as otherwise permitted by 42 CFR Part 2. A general authorization for
 the   
release of medical or other information is NOT sufficient for this purpose. The   
Federal rules restrict any use of the information to criminally investigate or   
prosecute any alcohol or drug abuse patient.[facility] CampusIn the event this   
information is protected by the Federal Confidentiality of Alcohol and Drug 
Abuse   
Patient Records regulations: This information has been disclosed to you from 
records   
protected by Federal confidentiality rules (42 CFR Part 2). The Federal rules   
prohibit you from making any further disclosure of this information unless 
further   
disclosure is expressly permitted by the written consent of the person to whom 
it   
pertains or as otherwise permitted by 42 CFR Part 2. A general authorization for
 the   
release of medical or other information is NOT sufficient for this purpose. The   
Federal rules restrict any use of the information to criminally investigate or   
prosecute any alcohol or drug abuse patient.[facility] CampusIn the event this   
information is protected by the Federal Confidentiality of Alcohol and Drug 
Abuse   
Patient Records regulations: This information has been disclosed to you from 
records   
protected by Federal confidentiality rules (42 CFR Part 2). The Federal rules   
prohibit you from making any further disclosure of this information unless 
further   
disclosure is expressly permitted by the written consent of the person to whom 
it   
pertains or as otherwise permitted by 42 CFR Part 2. A general authorization for
 the   
release of medical or other information is NOT sufficient for this purpose. The   
Federal rules restrict any use of the information to criminally investigate or   
prosecute any alcohol or drug abuse patient.[facility] CampusIn the event this   
information is protected by the Federal Confidentiality of Alcohol and Drug 
Abuse   
Patient Records regulations: This information has been disclosed to you from 
records   
protected by Federal confidentiality rules (42 CFR Part 2). The Federal rules   
prohibit you from making any further disclosure of this information unless 
further   
disclosure is expressly permitted by the written consent of the person to whom 
it   
pertains or as otherwise permitted by 42 CFR Part 2. A general authorization for
 the   
release of medical or other information is NOT sufficient for this purpose. The   
Federal rules restrict any use of the information to criminally investigate or   
prosecute any alcohol or drug abuse patient.[facility] CampusIn the event this   
information is protected by the Federal Confidentiality of Alcohol and Drug 
Abuse   
Patient Records regulations: This information has been disclosed to you from 
records   
protected by Federal confidentiality rules (42 CFR Part 2). The Federal rules   
prohibit you from making any further disclosure of this information unless 
further   
disclosure is expressly permitted by the written consent of the person to whom 
it   
pertains or as otherwise permitted by 42 CFR Part 2. A general authorization for
 the   
release of medical or other information is NOT sufficient for this purpose. The   
Federal rules restrict any use of the information to criminally investigate or   
prosecute any alcohol or drug abuse patient.[facility] CampusIn the event this   
information is protected by the Federal Confidentiality of Alcohol and Drug 
Abuse   
Patient Records regulations: This information has been disclosed to you from 
records   
protected by Federal confidentiality rules (42 CFR Part 2). The Federal rules   
prohibit you from making any further disclosure of this information unless 
further   
disclosure is expressly permitted by the written consent of the person to whom 
it   
pertains or as otherwise permitted by 42 CFR Part 2. A general authorization for
 the   
release of medical or other information is NOT sufficient for this purpose. The   
Federal rules restrict any use of the information to criminally investigate or   
prosecute any alcohol or drug abuse patient.[facility] CampusIn the event this   
information is protected by the Federal Confidentiality of Alcohol and Drug 
Abuse   
Patient Records regulations: This information has been disclosed to you from 
records   
protected by Federal confidentiality rules (42 CFR Part 2). The Federal rules   
prohibit you from making any further disclosure of this information unless 
further   
disclosure is expressly permitted by the written consent of the person to whom 
it   
pertains or as otherwise permitted by 42 CFR Part 2. A general authorization for
 the   
release of medical or other information is NOT sufficient for this purpose. The   
Federal rules restrict any use of the information to criminally investigate or   
prosecute any alcohol or drug abuse patient.[facility] CampusIn the event this   
information is protected by the Federal Confidentiality of Alcohol and Drug 
Abuse   
Patient Records regulations: This information has been disclosed to you from 
records   
protected by Federal confidentiality rules (42 CFR Part 2). The Federal rules   
prohibit you from making any further disclosure of this information unless 
further   
disclosure is expressly permitted by the written consent of the person to whom 
it   
pertains or as otherwise permitted by 42 CFR Part 2. A general authorization for
 the   
release of medical or other information is NOT sufficient for this purpose. The   
Federal rules restrict any use of the information to criminally investigate or   
prosecute any alcohol or drug abuse patient.[facility] CampusIn the event this   
information is protected by the Federal Confidentiality of Alcohol and Drug 
Abuse   
Patient Records regulations: This information has been disclosed to you from 
records   
protected by Federal confidentiality rules (42 CFR Part 2). The Federal rules   
prohibit you from making any further disclosure of this information unless 
further   
disclosure is expressly permitted by the written consent of the person to whom 
it   
pertains or as otherwise permitted by 42 CFR Part 2. A general authorization for
 the   
release of medical or other information is NOT sufficient for this purpose. The   
Federal rules restrict any use of the information to criminally investigate or   
prosecute any alcohol or drug abuse patient.[facility] CampusIn the event this   
information is protected by the Federal Confidentiality of Alcohol and Drug 
Abuse   
Patient Records regulations: This information has been disclosed to you from 
records   
protected by Federal confidentiality rules (42 CFR Part 2). The Federal rules   
prohibit you from making any further disclosure of this information unless 
further   
disclosure is expressly permitted by the written consent of the person to whom 
it   
pertains or as otherwise permitted by 42 CFR Part 2. A general authorization for
 the   
release of medical or other information is NOT sufficient for this purpose. The   
Federal rules restrict any use of the information to criminally investigate or   
prosecute any alcohol or drug abuse patient.[facility] CampusIn the event this   
information is protected by the Federal Confidentiality of Alcohol and Drug 
Abuse   
Patient Records regulations: This information has been disclosed to you from 
records   
protected by Federal confidentiality rules (42 CFR Part 2). The Federal rules   
prohibit you from making any further disclosure of this information unless 
further   
disclosure is expressly permitted by the written consent of the person to whom 
it   
pertains or as otherwise permitted by 42 CFR Part 2. A general authorization for
 the   
release of medical or other information is NOT sufficient for this purpose. The   
Federal rules restrict any use of the information to criminally investigate or   
prosecute any alcohol or drug abuse patient.[facility] CampusIn the event this   
information is protected by the Federal Confidentiality of Alcohol and Drug 
Abuse   
Patient Records regulations: This information has been disclosed to you from 
records   
protected by Federal confidentiality rules (42 CFR Part 2). The Federal rules   
prohibit you from making any further disclosure of this information unless 
further   
disclosure is expressly permitted by the written consent of the person to whom 
it   
pertains or as otherwise permitted by 42 CFR Part 2. A general authorization for
 the   
release of medical or other information is NOT sufficient for this purpose. The   
Federal rules restrict any use of the information to criminally investigate or   
prosecute any alcohol or drug abuse patient.[facility] CampusIn the event this   
information is protected by the Federal Confidentiality of Alcohol and Drug 
Abuse   
Patient Records regulations: This information has been disclosed to you from 
records   
protected by Federal confidentiality rules (42 CFR Part 2). The Federal rules   
prohibit you from making any further disclosure of this information unless 
further   
disclosure is expressly permitted by the written consent of the person to whom 
it   
pertains or as otherwise permitted by 42 CFR Part 2. A general authorization for
 the   
release of medical or other information is NOT sufficient for this purpose. The   
Federal rules restrict any use of the information to criminally investigate or   
prosecute any alcohol or drug abuse patient.[facility] CampusIn the event this   
information is protected by the Federal Confidentiality of Alcohol and Drug 
Abuse   
Patient Records regulations: This information has been disclosed to you from 
records   
protected by Federal confidentiality rules (42 CFR Part 2). The Federal rules   
prohibit you from making any further disclosure of this information unless 
further   
disclosure is expressly permitted by the written consent of the person to whom 
it   
pertains or as otherwise permitted by 42 CFR Part 2. A general authorization for
 the   
release of medical or other information is NOT sufficient for this purpose. The   
Federal rules restrict any use of the information to criminally investigate or   
prosecute any alcohol or drug abuse patient.[facility] CampusIn the event this   
information is protected by the Federal Confidentiality of Alcohol and Drug 
Abuse   
Patient Records regulations: This information has been disclosed to you from 
records   
protected by Federal confidentiality rules (42 CFR Part 2). The Federal rules   
prohibit you from making any further disclosure of this information unless 
further   
disclosure is expressly permitted by the written consent of the person to whom 
it   
pertains or as otherwise permitted by 42 CFR Part 2. A general authorization for
 the   
release of medical or other information is NOT sufficient for this purpose. The   
Federal rules restrict any use of the information to criminally investigate or   
prosecute any alcohol or drug abuse patient.[facility] CampusIn the event this   
information is protected by the Federal Confidentiality of Alcohol and Drug 
Abuse   
Patient Records regulations: This information has been disclosed to you from 
records   
protected by Federal confidentiality rules (42 CFR Part 2). The Federal rules   
prohibit you from making any further disclosure of this information unless 
further   
disclosure is expressly permitted by the written consent of the person to whom 
it   
pertains or as otherwise permitted by 42 CFR Part 2. A general authorization for
 the   
release of medical or other information is NOT sufficient for this purpose. The   
Federal rules restrict any use of the information to criminally investigate or   
prosecute any alcohol or drug abuse patient.[facility] CampusIn the event this   
information is protected by the Federal Confidentiality of Alcohol and Drug 
Abuse   
Patient Records regulations: This information has been disclosed to you from 
records   
protected by Federal confidentiality rules (42 CFR Part 2). The Federal rules   
prohibit you from making any further disclosure of this information unless 
further   
disclosure is expressly permitted by the written consent of the person to whom 
it   
pertains or as otherwise permitted by 42 CFR Part 2. A general authorization for
 the   
release of medical or other information is NOT sufficient for this purpose. The   
Federal rules restrict any use of the information to criminally investigate or   
prosecute any alcohol or drug abuse patient.[facility] CampusIn the event this   
information is protected by the Federal Confidentiality of Alcohol and Drug 
Abuse   
Patient Records regulations: This information has been disclosed to you from 
records   
protected by Federal confidentiality rules (42 CFR Part 2). The Federal rules   
prohibit you from making any further disclosure of this information unless 
further   
disclosure is expressly permitted by the written consent of the person to whom 
it   
pertains or as otherwise permitted by 42 CFR Part 2. A general authorization for
 the   
release of medical or other information is NOT sufficient for this purpose. The   
Federal rules restrict any use of the information to criminally investigate or   
prosecute any alcohol or drug abuse patient.[facility] CampusIn the event this   
information is protected by the Federal Confidentiality of Alcohol and Drug 
Abuse   
Patient Records regulations: This information has been disclosed to you from 
records   
protected by Federal confidentiality rules (42 CFR Part 2). The Federal rules   
prohibit you from making any further disclosure of this information unless 
further   
disclosure is expressly permitted by the written consent of the person to whom 
it   
pertains or as otherwise permitted by 42 CFR Part 2. A general authorization for
 the   
release of medical or other information is NOT sufficient for this purpose. The   
Federal rules restrict any use of the information to criminally investigate or   
prosecute any alcohol or drug abuse patient.[facility] CampusIn the event this   
information is protected by the Federal Confidentiality of Alcohol and Drug 
Abuse   
Patient Records regulations: This information has been disclosed to you from 
records   
protected by Federal confidentiality rules (42 CFR Part 2). The Federal rules   
prohibit you from making any further disclosure of this information unless 
further   
disclosure is expressly permitted by the written consent of the person to whom 
it   
pertains or as otherwise permitted by 42 CFR Part 2. A general authorization for
 the   
release of medical or other information is NOT sufficient for this purpose. The   
Federal rules restrict any use of the information to criminally investigate or   
prosecute any alcohol or drug abuse patient.[facility] CampusIn the event this   
information is protected by the Federal Confidentiality of Alcohol and Drug 
Abuse   
Patient Records regulations: This information has been disclosed to you from 
records   
protected by Federal confidentiality rules (42 CFR Part 2). The Federal rules   
prohibit you from making any further disclosure of this information unless 
further   
disclosure is expressly permitted by the written consent of the person to whom 
it   
pertains or as otherwise permitted by 42 CFR Part 2. A general authorization for
 the   
release of medical or other information is NOT sufficient for this purpose. The   
Federal rules restrict any use of the information to criminally investigate or   
prosecute any alcohol or drug abuse patient.[facility] CampusIn the event this   
information is protected by the Federal Confidentiality of Alcohol and Drug 
Abuse   
Patient Records regulations: This information has been disclosed to you from 
records   
protected by Federal confidentiality rules (42 CFR Part 2). The Federal rules   
prohibit you from making any further disclosure of this information unless 
further   
disclosure is expressly permitted by the written consent of the person to whom 
it   
pertains or as otherwise permitted by 42 CFR Part 2. A general authorization for
 the   
release of medical or other information is NOT sufficient for this purpose. The   
Federal rules restrict any use of the information to criminally investigate or   
prosecute any alcohol or drug abuse patient.[facility] CampusIn the event this   
information is protected by the Federal Confidentiality of Alcohol and Drug 
Abuse   
Patient Records regulations: This information has been disclosed to you from 
records   
protected by Federal confidentiality rules (42 CFR Part 2). The Federal rules   
prohibit you from making any further disclosure of this information unless 
further   
disclosure is expressly permitted by the written consent of the person to whom 
it   
pertains or as otherwise permitted by 42 CFR Part 2. A general authorization for
 the   
release of medical or other information is NOT sufficient for this purpose. The   
Federal rules restrict any use of the information to criminally investigate or   
prosecute any alcohol or drug abuse patient.[facility] CampusIn the event this   
information is protected by the Federal Confidentiality of Alcohol and Drug 
Abuse   
Patient Records regulations: This information has been disclosed to you from 
records   
protected by Federal confidentiality rules (42 CFR Part 2). The Federal rules   
prohibit you from making any further disclosure of this information unless 
further   
disclosure is expressly permitted by the written consent of the person to whom 
it   
pertains or as otherwise permitted by 42 CFR Part 2. A general authorization for
 the   
release of medical or other information is NOT sufficient for this purpose. The   
Federal rules restrict any use of the information to criminally investigate or   
prosecute any alcohol or drug abuse patient.[facility] the event this   
information is protected by the Federal Confidentiality of Alcohol and Drug 
Abuse   
Patient Records regulations: This information has been disclosed to you from 
records   
protected by Federal confidentiality rules (42 CFR Part 2). The Federal rules   
prohibit you from making any further disclosure of this information unless 
further   
disclosure is expressly permitted by the written consent of the person to whom 
it   
pertains or as otherwise permitted by 42 CFR Part 2. A general authorization for
 the   
release of medical or other information is NOT sufficient for this purpose. The   
Federal rules restrict any use of the information to criminally investigate or   
prosecute any alcohol or drug abuse patient.[facility] the event this   
information is protected by the Federal Confidentiality of Alcohol and Drug 
Abuse   
Patient Records regulations: This information has been disclosed to you from 
records   
protected by Federal confidentiality rules (42 CFR Part 2). The Federal rules   
prohibit you from making any further disclosure of this information unless 
further   
disclosure is expressly permitted by the written consent of the person to whom 
it   
pertains or as otherwise permitted by 42 CFR Part 2. A general authorization for
 the   
release of medical or other information is NOT sufficient for this purpose. The   
Federal rules restrict any use of the information to criminally investigate or   
prosecute any alcohol or drug abuse patient.[facility] CampusIn the event this   
information is protected by the Federal Confidentiality of Alcohol and Drug 
Abuse   
Patient Records regulations: This information has been disclosed to you from 
records   
protected by Federal confidentiality rules (42 CFR Part 2). The Federal rules   
prohibit you from making any further disclosure of this information unless 
further   
disclosure is expressly permitted by the written consent of the person to whom 
it   
pertains or as otherwise permitted by 42 CFR Part 2. A general authorization for
 the   
release of medical or other information is NOT sufficient for this purpose. The   
Federal rules restrict any use of the information to criminally investigate or   
prosecute any alcohol or drug abuse patient.[facility] CampusIn the event this   
information is protected by the Federal Confidentiality of Alcohol and Drug 
Abuse   
Patient Records regulations: This information has been disclosed to you from 
records   
protected by Federal confidentiality rules (42 CFR Part 2). The Federal rules   
prohibit you from making any further disclosure of this information unless 
further   
disclosure is expressly permitted by the written consent of the person to whom 
it   
pertains or as otherwise permitted by 42 CFR Part 2. A general authorization for
 the   
release of medical or other information is NOT sufficient for this purpose. The   
Federal rules restrict any use of the information to criminally investigate or   
prosecute any alcohol or drug abuse patient.[facility] CampusIn the event this   
information is protected by the Federal Confidentiality of Alcohol and Drug 
Abuse   
Patient Records regulations: This information has been disclosed to you from 
records   
protected by Federal confidentiality rules (42 CFR Part 2). The Federal rules   
prohibit you from making any further disclosure of this information unless 
further   
disclosure is expressly permitted by the written consent of the person to whom 
it   
pertains or as otherwise permitted by 42 CFR Part 2. A general authorization for
 the   
release of medical or other information is NOT sufficient for this purpose. The   
Federal rules restrict any use of the information to criminally investigate or   
prosecute any alcohol or drug abuse patient.[facility] CampusIn the event this   
information is protected by the Federal Confidentiality of Alcohol and Drug 
Abuse   
Patient Records regulations: This information has been disclosed to you from 
records   
protected by Federal confidentiality rules (42 CFR Part 2). The Federal rules   
prohibit you from making any further disclosure of this information unless 
further   
disclosure is expressly permitted by the written consent of the person to whom 
it   
pertains or as otherwise permitted by 42 CFR Part 2. A general authorization for
 the   
release of medical or other information is NOT sufficient for this purpose. The   
Federal rules restrict any use of the information to criminally investigate or   
prosecute any alcohol or drug abuse patient.[facility] CampusIn the event this   
information is protected by the Federal Confidentiality of Alcohol and Drug 
Abuse   
Patient Records regulations: This information has been disclosed to you from 
records   
protected by Federal confidentiality rules (42 CFR Part 2). The Federal rules   
prohibit you from making any further disclosure of this information unless 
further   
disclosure is expressly permitted by the written consent of the person to whom 
it   
pertains or as otherwise permitted by 42 CFR Part 2. A general authorization for
 the   
release of medical or other information is NOT sufficient for this purpose. The   
Federal rules restrict any use of the information to criminally investigate or   
prosecute any alcohol or drug abuse patient.[facility] CampusIn the event this   
information is protected by the Federal Confidentiality of Alcohol and Drug 
Abuse   
Patient Records regulations: This information has been disclosed to you from 
records   
protected by Federal confidentiality rules (42 CFR Part 2). The Federal rules   
prohibit you from making any further disclosure of this information unless 
further   
disclosure is expressly permitted by the written consent of the person to whom 
it   
pertains or as otherwise permitted by 42 CFR Part 2. A general authorization for
 the   
release of medical or other information is NOT sufficient for this purpose. The   
Federal rules restrict any use of the information to criminally investigate or   
prosecute any alcohol or drug abuse patient.[facility] CampusIn the event this   
information is protected by the Federal Confidentiality of Alcohol and Drug 
Abuse   
Patient Records regulations: This information has been disclosed to you from 
records   
protected by Federal confidentiality rules (42 CFR Part 2). The Federal rules   
prohibit you from making any further disclosure of this information unless 
further   
disclosure is expressly permitted by the written consent of the person to whom 
it   
pertains or as otherwise permitted by 42 CFR Part 2. A general authorization for
 the   
release of medical or other information is NOT sufficient for this purpose. The   
Federal rules restrict any use of the information to criminally investigate or   
prosecute any alcohol or drug abuse patient.[facility] CampusIn the event this   
information is protected by the Federal Confidentiality of Alcohol and Drug 
Abuse   
Patient Records regulations: This information has been disclosed to you from 
records   
protected by Federal confidentiality rules (42 CFR Part 2). The Federal rules   
prohibit you from making any further disclosure of this information unless 
further   
disclosure is expressly permitted by the written consent of the person to whom 
it   
pertains or as otherwise permitted by 42 CFR Part 2. A general authorization for
 the   
release of medical or other information is NOT sufficient for this purpose. The   
Federal rules restrict any use of the information to criminally investigate or   
prosecute any alcohol or drug abuse patient.[facility] CampusIn the event this   
information is protected by the Federal Confidentiality of Alcohol and Drug 
Abuse   
Patient Records regulations: This information has been disclosed to you from 
records   
protected by Federal confidentiality rules (42 CFR Part 2). The Federal rules   
prohibit you from making any further disclosure of this information unless 
further   
disclosure is expressly permitted by the written consent of the person to whom 
it   
pertains or as otherwise permitted by 42 CFR Part 2. A general authorization for
 the   
release of medical or other information is NOT sufficient for this purpose. The   
Federal rules restrict any use of the information to criminally investigate or   
prosecute any alcohol or drug abuse patient.[facility] CampusIn the event this   
information is protected by the Federal Confidentiality of Alcohol and Drug 
Abuse   
Patient Records regulations: This information has been disclosed to you from 
records   
protected by Federal confidentiality rules (42 CFR Part 2). The Federal rules   
prohibit you from making any further disclosure of this information unless 
further   
disclosure is expressly permitted by the written consent of the person to whom 
it   
pertains or as otherwise permitted by 42 CFR Part 2. A general authorization for
 the   
release of medical or other information is NOT sufficient for this purpose. The   
Federal rules restrict any use of the information to criminally investigate or   
prosecute any alcohol or drug abuse patient.[facility] CampusIn the event this   
information is protected by the Federal Confidentiality of Alcohol and Drug 
Abuse   
Patient Records regulations: This information has been disclosed to you from 
records   
protected by Federal confidentiality rules (42 CFR Part 2). The Federal rules   
prohibit you from making any further disclosure of this information unless 
further   
disclosure is expressly permitted by the written consent of the person to whom 
it   
pertains or as otherwise permitted by 42 CFR Part 2. A general authorization for
 the   
release of medical or other information is NOT sufficient for this purpose. The   
Federal rules restrict any use of the information to criminally investigate or   
prosecute any alcohol or drug abuse patient.[facility] CampusIn the event this   
information is protected by the Federal Confidentiality of Alcohol and Drug 
Abuse   
Patient Records regulations: This information has been disclosed to you from 
records   
protected by Federal confidentiality rules (42 CFR Part 2). The Federal rules   
prohibit you from making any further disclosure of this information unless 
further   
disclosure is expressly permitted by the written consent of the person to whom 
it   
pertains or as otherwise permitted by 42 CFR Part 2. A general authorization for
 the   
release of medical or other information is NOT sufficient for this purpose. The   
Federal rules restrict any use of the information to criminally investigate or   
prosecute any alcohol or drug abuse patient.[facility] CampusIn the event this   
information is protected by the Federal Confidentiality of Alcohol and Drug 
Abuse   
Patient Records regulations: This information has been disclosed to you from 
records   
protected by Federal confidentiality rules (42 CFR Part 2). The Federal rules   
prohibit you from making any further disclosure of this information unless 
further   
disclosure is expressly permitted by the written consent of the person to whom 
it   
pertains or as otherwise permitted by 42 CFR Part 2. A general authorization for
 the   
release of medical or other information is NOT sufficient for this purpose. The   
Federal rules restrict any use of the information to criminally investigate or   
prosecute any alcohol or drug abuse patient.[facility] CampusIn the event this   
information is protected by the Federal Confidentiality of Alcohol and Drug 
Abuse   
Patient Records regulations: This information has been disclosed to you from 
records   
protected by Federal confidentiality rules (42 CFR Part 2). The Federal rules   
prohibit you from making any further disclosure of this information unless 
further   
disclosure is expressly permitted by the written consent of the person to whom 
it   
pertains or as otherwise permitted by 42 CFR Part 2. A general authorization for
 the   
release of medical or other information is NOT sufficient for this purpose. The   
Federal rules restrict any use of the information to criminally investigate or   
prosecute any alcohol or drug abuse patient.[facility] CampusIn the event this   
information is protected by the Federal Confidentiality of Alcohol and Drug 
Abuse   
Patient Records regulations: This information has been disclosed to you from 
records   
protected by Federal confidentiality rules (42 CFR Part 2). The Federal rules   
prohibit you from making any further disclosure of this information unless 
further   
disclosure is expressly permitted by the written consent of the person to whom 
it   
pertains or as otherwise permitted by 42 CFR Part 2. A general authorization for
 the   
release of medical or other information is NOT sufficient for this purpose. The   
Federal rules restrict any use of the information to criminally investigate or   
prosecute any alcohol or drug abuse patient.[facility] CampusIn the event this   
information is protected by the Federal Confidentiality of Alcohol and Drug 
Abuse   
Patient Records regulations: This information has been disclosed to you from 
records   
protected by Federal confidentiality rules (42 CFR Part 2). The Federal rules   
prohibit you from making any further disclosure of this information unless 
further   
disclosure is expressly permitted by the written consent of the person to whom 
it   
pertains or as otherwise permitted by 42 CFR Part 2. A general authorization for
 the   
release of medical or other information is NOT sufficient for this purpose. The   
Federal rules restrict any use of the information to criminally investigate or   
prosecute any alcohol or drug abuse patient.[facility] CampusIn the event this   
information is protected by the Federal Confidentiality of Alcohol and Drug 
Abuse   
Patient Records regulations: This information has been disclosed to you from 
records   
protected by Federal confidentiality rules (42 CFR Part 2). The Federal rules   
prohibit you from making any further disclosure of this information unless 
further   
disclosure is expressly permitted by the written consent of the person to whom 
it   
pertains or as otherwise permitted by 42 CFR Part 2. A general authorization for
 the   
release of medical or other information is NOT sufficient for this purpose. The   
Federal rules restrict any use of the information to criminally investigate or   
prosecute any alcohol or drug abuse patient.[facility] CampusIn the event this   
information is protected by the Federal Confidentiality of Alcohol and Drug 
Abuse   
Patient Records regulations: This information has been disclosed to you from 
records   
protected by Federal confidentiality rules (42 CFR Part 2). The Federal rules   
prohibit you from making any further disclosure of this information unless 
further   
disclosure is expressly permitted by the written consent of the person to whom 
it   
pertains or as otherwise permitted by 42 CFR Part 2. A general authorization for
 the   
release of medical or other information is NOT sufficient for this purpose. The   
Federal rules restrict any use of the information to criminally investigate or   
prosecute any alcohol or drug abuse patient.[facility] CampusIn the event this   
information is protected by the Federal Confidentiality of Alcohol and Drug 
Abuse   
Patient Records regulations: This information has been disclosed to you from 
records   
protected by Federal confidentiality rules (42 CFR Part 2). The Federal rules   
prohibit you from making any further disclosure of this information unless 
further   
disclosure is expressly permitted by the written consent of the person to whom 
it   
pertains or as otherwise permitted by 42 CFR Part 2. A general authorization for
 the   
release of medical or other information is NOT sufficient for this purpose. The   
Federal rules restrict any use of the information to criminally investigate or   
prosecute any alcohol or drug abuse patient.[facility] CampusIn the event this   
information is protected by the Federal Confidentiality of Alcohol and Drug 
Abuse   
Patient Records regulations: This information has been disclosed to you from 
records   
protected by Federal confidentiality rules (42 CFR Part 2). The Federal rules   
prohibit you from making any further disclosure of this information unless 
further   
disclosure is expressly permitted by the written consent of the person to whom 
it   
pertains or as otherwise permitted by 42 CFR Part 2. A general authorization for
 the   
release of medical or other information is NOT sufficient for this purpose. The   
Federal rules restrict any use of the information to criminally investigate or   
prosecute any alcohol or drug abuse patient.[facility] CampusIn the event this   
information is protected by the Federal Confidentiality of Alcohol and Drug 
Abuse   
Patient Records regulations: This information has been disclosed to you from 
records   
protected by Federal confidentiality rules (42 CFR Part 2). The Federal rules   
prohibit you from making any further disclosure of this information unless 
further   
disclosure is expressly permitted by the written consent of the person to whom 
it   
pertains or as otherwise permitted by 42 CFR Part 2. A general authorization for
 the   
release of medical or other information is NOT sufficient for this purpose. The   
Federal rules restrict any use of the information to criminally investigate or   
prosecute any alcohol or drug abuse patient.[facility] CampusIn the event this   
information is protected by the Federal Confidentiality of Alcohol and Drug 
Abuse   
Patient Records regulations: This information has been disclosed to you from 
records   
protected by Federal confidentiality rules (42 CFR Part 2). The Federal rules   
prohibit you from making any further disclosure of this information unless 
further   
disclosure is expressly permitted by the written consent of the person to whom 
it   
pertains or as otherwise permitted by 42 CFR Part 2. A general authorization for
 the   
release of medical or other information is NOT sufficient for this purpose. The   
Federal rules restrict any use of the information to criminally investigate or   
prosecute any alcohol or drug abuse patient.[facility] CampusIn the event this   
information is protected by the Federal Confidentiality of Alcohol and Drug 
Abuse   
Patient Records regulations: This information has been disclosed to you from 
records   
protected by Federal confidentiality rules (42 CFR Part 2). The Federal rules   
prohibit you from making any further disclosure of this information unless 
further   
disclosure is expressly permitted by the written consent of the person to whom 
it   
pertains or as otherwise permitted by 42 CFR Part 2. A general authorization for
 the   
release of medical or other information is NOT sufficient for this purpose. The   
Federal rules restrict any use of the information to criminally investigate or   
prosecute any alcohol or drug abuse patient.[facility] CampusIn the event this   
information is protected by the Federal Confidentiality of Alcohol and Drug 
Abuse   
Patient Records regulations: This information has been disclosed to you from 
records   
protected by Federal confidentiality rules (42 CFR Part 2). The Federal rules   
prohibit you from making any further disclosure of this information unless 
further   
disclosure is expressly permitted by the written consent of the person to whom 
it   
pertains or as otherwise permitted by 42 CFR Part 2. A general authorization for
 the   
release of medical or other information is NOT sufficient for this purpose. The   
Federal rules restrict any use of the information to criminally investigate or   
prosecute any alcohol or drug abuse patient.[facility] CampusIn the event this   
information is protected by the Federal Confidentiality of Alcohol and Drug 
Abuse   
Patient Records regulations: This information has been disclosed to you from 
records   
protected by Federal confidentiality rules (42 CFR Part 2). The Federal rules   
prohibit you from making any further disclosure of this information unless 
further   
disclosure is expressly permitted by the written consent of the person to whom 
it   
pertains or as otherwise permitted by 42 CFR Part 2. A general authorization for
 the   
release of medical or other information is NOT sufficient for this purpose. The   
Federal rules restrict any use of the information to criminally investigate or   
prosecute any alcohol or drug abuse patient.[facility] CampusIn the event this   
information is protected by the Federal Confidentiality of Alcohol and Drug 
Abuse   
Patient Records regulations: This information has been disclosed to you from 
records   
protected by Federal confidentiality rules (42 CFR Part 2). The Federal rules   
prohibit you from making any further disclosure of this information unless 
further   
disclosure is expressly permitted by the written consent of the person to whom 
it   
pertains or as otherwise permitted by 42 CFR Part 2. A general authorization for
 the   
release of medical or other information is NOT sufficient for this purpose. The   
Federal rules restrict any use of the information to criminally investigate or   
prosecute any alcohol or drug abuse patient.[facility] CampusIn the event this   
information is protected by the Federal Confidentiality of Alcohol and Drug 
Abuse   
Patient Records regulations: This information has been disclosed to you from 
records   
protected by Federal confidentiality rules (42 CFR Part 2). The Federal rules   
prohibit you from making any further disclosure of this information unless 
further   
disclosure is expressly permitted by the written consent of the person to whom 
it   
pertains or as otherwise permitted by 42 CFR Part 2. A general authorization for
 the   
release of medical or other information is NOT sufficient for this purpose. The   
Federal rules restrict any use of the information to criminally investigate or   
prosecute any alcohol or drug abuse patient.[facility] CampusIn the event this   
information is protected by the Federal Confidentiality of Alcohol and Drug 
Abuse   
Patient Records regulations: This information has been disclosed to you from 
records   
protected by Federal confidentiality rules (42 CFR Part 2). The Federal rules   
prohibit you from making any further disclosure of this information unless 
further   
disclosure is expressly permitted by the written consent of the person to whom 
it   
pertains or as otherwise permitted by 42 CFR Part 2. A general authorization for
 the   
release of medical or other information is NOT sufficient for this purpose. The   
Federal rules restrict any use of the information to criminally investigate or   
prosecute any alcohol or drug abuse patient.[facility] CampusIn the event this   
information is protected by the Federal Confidentiality of Alcohol and Drug 
Abuse   
Patient Records regulations: This information has been disclosed to you from 
records   
protected by Federal confidentiality rules (42 CFR Part 2). The Federal rules   
prohibit you from making any further disclosure of this information unless 
further   
disclosure is expressly permitted by the written consent of the person to whom 
it   
pertains or as otherwise permitted by 42 CFR Part 2. A general authorization for
 the   
release of medical or other information is NOT sufficient for this purpose. The   
Federal rules restrict any use of the information to criminally investigate or   
prosecute any alcohol or drug abuse patient.[facility]  
  
  
  
  
                                                    Reason for Visit (unrecogniz  
ed section and content)  
   
                                          
  
  
  
                                          
   
                                          
  
  
  
                                        Reason              Comments  
   
                                        Orders                
  
  
  
                                Reason          Onset Date      Comments  
   
                                Refill Request  2022        
  
  
  
                                        Reason              Comments  
   
                                        Patient Question      
  
  
  
                                        Reason              Comments  
   
                                        F/U 6 months          
  
  
  
                                        Reason              Comments  
   
                                        Results               
  
  
  
                                        Reason              Comments  
   
                                        Home Care           Confirmation Call  
  
  
  
                                        Reason              Comments  
   
                                        Patient Update        
  
  
  
                                        Reason              Comments  
   
                                        Home Care           PDGM  
  
  
  
                                        Reason              Comments  
   
                                        Home Care             
  
  
  
                                        Reason              Comments  
   
                                        Home Health calling for verbal order   
  
  
  
                                        Reason              Comments  
   
                                        Home Care           Fall without injury  
  
  
  
                                        Reason              Comments  
   
                                        Consult               
   
                                        Orders                
  
  
  
                                        Reason              Comments  
   
                                        New Patient           
  
  
  
                                        Reason              Comments  
   
                                        OT EVAL               
  
  
  
                          Specialty    Diagnoses / Procedures Referred By Contac  
t Referred To Contact  
   
                                                    [facility]                               
  
  
Diagnoses  
  
  
Peripheral   
polyneuropathy  
  
  
Abnormality of gait  
  
  
  
Procedures  
  
  
CONSULT TO OCCUPATIONAL   
THER  
  
  
OCCUPATIONAL THERAPY   
EVAL HIGH COMPLEX 60   
MINS                                      
  
  
Júnior Ahuja MD  
  
  
[address] [place] [address]  
  
  
Phone: [phone]  
  
  
Fax: [phone]                         
  
  
[facility] [place]  
  
  
[address] [place] [address]  
  
  
  
                          Referral ID  Status       Reason       Start   
Date                                    Expiration   
Date                                    Visits   
Requested                               Visits   
Authorized  
   
                                [id number]        Authorized        
  
  
PCP Requested   
Referral  
  
  
Auto-Generate  
d Referral                              10/17/202  
2                   10/17/2023          99                  99  
  
  
  
                                        Reason              Comments  
   
                                        Forms                 
  
  
  
                                        Reason              Comments  
   
                                        New Patient Evaluation   
  
  
  
                                        Reason              Comments  
   
                                        Cirrhosis             
  
  
  
                          Specialty    Diagnoses / Procedures Referred By Contac  
t Referred To Contact  
   
                                                              
  
  
Diagnoses  
  
  
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   
ascites, unspecified hepatic   
cirrhosis type (HCC)  
  
  
Splenomegaly  
  
  
  
Procedures  
  
  
CONSULT TO HEPATOLOGY  
  
  
OFFICE/OUTPATIENT [facility] 60-74 MINUTES                         
  
  
Tello Ortega DO  
  
  
[address] [place] [address]  
  
  
Phone: [phone]  
  
  
Fax: [phone]                         
  
  
  
  
  
                    Referral ID Status    Reason    Start Date Expiration Date V  
isits   
Requested                               Visits   
Authorized  
   
                                [id number]        Closed            
  
  
PCP Requested   
Referral        2023        1               1  
  
  
  
                                        Reason              Comments  
   
                                        Rash                  
  
  
  
                                        Reason              Comments  
   
                                        Non-Chemotherapy Treatment   
  
  
  
                          Specialty    Diagnoses / Procedures Referred By Contac  
t Referred To Contact  
   
                                                              
  
  
Diagnoses  
  
  
Iron deficiency anemia due to   
chronic blood loss  
  
  
Iron malabsorption  
                                          
  
  
Tello Ortega, DO  
  
  
[address]  
  
  
[place] [address]  
  
  
Phone: [phone]  
  
  
Fax: [phone]                         
  
  
[facility]  
  
  
[address]  
  
  
[place] [address]  
  
  
Phone: [phone]  
  
  
Fax: [phone]  
  
  
  
                    Referral ID Status    Reason    Start Date Expiration Date V  
isits   
Requested                               Visits   
Authorized  
   
                [id number] Authorized         2023 99      99  
  
  
  
                                        Reason              Comments  
   
                                        Consult             colonoscopy  
  
  
  
                                        Reason              Comments  
   
                                        Appointment           
  
  
  
                                        Reason              Comments  
   
                                        Patient Update        
   
                                        Patient Question      
  
  
  
                                        Reason              Comments  
   
                                        Follow Up             
  
  
  
                                Reason          Onset Date      Comments  
   
                                Refill Request  2023        
  
  
  
                                        Reason              Comments  
   
                                        sores on lower legs X 2 weeks  
  
  
  
                                        Reason              Comments  
   
                                        Consult               
  
  
  
                                        Reason              Comments  
   
                                        Express Care follow-up   
  
  
  
                                        Reason              Comments  
   
                                        Radiology Pre Procedure Instructions   
  
  
  
                                        Reason              Comments  
   
                                        Radiology MRI       liver, under general  
 anesthesia  
  
  
  
                          Specialty    Diagnoses / Procedures Referred By Contac  
t Referred To Contact  
   
                                        MR IMAGING            
  
  
Diagnoses  
  
  
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   
ascites, unspecified hepatic   
cirrhosis type (HCC)  
  
  
Liver mass  
  
  
Splenomegaly  
  
  
  
Procedures  
  
  
MRI LIVER WO/W IVCON  
  
  
MRI ABDOMEN W/O & W/CONTRAST   
MATERIAL                                  
  
  
Maurizio Cochran PA-C  
  
  
[address] [place] [id number]  
  
  
Phone: [phone]  
  
  
Fax: [phone]                         
  
  
[facility]  
  
  
  
                    Referral ID Status    Reason    Start Date Expiration Date V  
isits   
Requested                               Visits   
Authorized  
   
                                [id number]        Closed            
  
  
Auto-Generate  
d Referral      2023       1               1  
  
  
  
                                        Reason              Comments  
   
                                        Radiology MRI         
  
  
  
                          Specialty    Diagnoses / Procedures Referred By Contac  
t Referred To Contact  
   
                                        MR IMAGING            
  
  
Diagnoses  
  
  
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   
ascites, unspecified hepatic   
cirrhosis type (HCC)  
  
  
Liver mass  
  
  
Splenomegaly  
  
  
  
Procedures  
  
  
MRI LIVER WO/W IVCON  
  
  
MRI ABDOMEN W/O & W/CONTRAST   
MATERIAL                                  
  
  
Maurizio Cochran PA-GEORGE  
  
  
[address] [place] [address]  
  
  
Phone: [phone]  
  
  
Fax: [phone]                         
  
  
[facility]  
  
  
  
                                        Reason              Comments  
   
                                        Established Patient 4cm hcc  
  
  
  
                                Reason          Onset Date      Comments  
   
                                SPP General - Treatment Referral 2023       
 Xifaxan  
   
                                Insurance Authorization 2023      [employer]  
  
  
  
                                Reason          Onset Date      Comments  
   
                                Simulation Request Form 2023        
  
  
  
                                        Reason              Comments  
   
                                        Patient Education     
  
  
  
                                Reason          Onset Date      Comments  
   
                                Refill Request  2023        
  
  
  
                                Reason          Onset Date      Comments  
   
                                Refill Request  2023        
  
  
  
                                        Reason              Comments  
   
                                        Care Coordinator - Other   
  
  
  
                                        Reason              Comments  
   
                                        OT home health order   
  
  
  
                                        Reason              Comments  
   
                                        [facility] Order Request     
  
  
  
                                        Reason              Comments  
   
                                        [facility] POC         
  
  
  
                                        Reason              Comments  
   
                                        Physical Therapy Plan of Care   
  
  
  
                                        Reason              Comments  
   
                                        Established Patient   
  
  
  
                                Reason          Onset Date      Comments  
   
                                Refill Request  2023        
  
  
  
                    Referral ID Status    Reason    Start Date Expiration Date V  
isits   
Requested                               Visits   
Authorized  
   
                [id number] Authorized         2023 99      99  
  
  
  
                                        Reason              Comments  
   
                                        Established Patient 3 month follow up  
  
  
  
                                        Reason              Comments  
   
                                        Radiology US          
  
  
  
                          Specialty    Diagnoses / Procedures Referred By Contac  
t Referred To Contact  
   
                                        US IMAGING            
  
  
Diagnoses  
  
  
Monoclonal gammopathy  
  
  
Macrocytic anemia  
  
  
Thrombocytopenia (HCC)  
  
  
  
Procedures  
  
  
US ABD RT UPPER QUADRANT  
  
  
US ABDOMINAL REAL TIME   
W/IMAGE LIMITED                           
  
  
Tello Ortega, DO  
  
  
[address] [place] [address]  
  
  
Phone: [phone]  
  
  
Fax: [phone]                         
  
  
[facility]  
  
  
[place] [address]  
  
  
  
                    Referral ID Status    Reason    Start Date Expiration Date V  
isits   
Requested                               Visits   
Authorized  
   
                                [id number]        Closed            
  
  
Auto-Generate  
d Referral      2022        1               1  
  
  
  
                          Specialty    Diagnoses / Procedures Referred By Contac  
t Referred To Contact  
   
                                        MR IMAGING            
  
  
Diagnoses  
  
  
Liver disease  
  
  
  
Procedures  
  
  
MRI LIVER WO/W IVCON  
  
  
MRI ABDOMEN W/O &   
W/CONTRAST MATERIAL                       
  
  
Marin Fish MD  
  
  
[address] [place] [address]  
  
  
Phone: [phone]  
  
  
Fax: [phone]                         
  
  
[facility]  
  
  
[place] [address]  
  
  
  
                    Referral ID Status    Reason    Start Date Expiration Date V  
isits   
Requested                               Visits   
Authorized  
   
                                [id number]        Closed            
  
  
Auto-Generate  
d Referral      2023       1               1  
  
  
  
                                        Reason              Comments  
   
                                        Appointment Confirmation   
  
  
  
  
  
                                                    Care Teams (unrecognized sec  
tion and content)  
   
                                          
  
  
  
                                          
   
                                          
  
  
  
                      Team Member Relationship Specialty  Start Date End Date  
   
                                                      
  
  
Júnior Ahuja MD  
  
  
[address], [place] [address] [phone] (Work)  
  
  
[phone] (Fax) PCP - General                   10/22/02          
  
  
  
                      Team Member Relationship Specialty  Start Date End Date  
   
                                                      
  
  
Júnior Ahuja MD  
  
  
[address], [place] [id number] [phone] (Work)  
  
  
[phone] (Fax) PCP - General                   10/22/02          
  
  
  
                      Team Member Relationship Specialty  Start Date End Date  
   
                                                      
  
  
Júnior Ahuja MD  
  
  
[address], [place] [address] [phone] (Work)  
  
  
[phone] (Fax) PCP - General                   10/22/02          
  
  
  
                      Team Member Relationship Specialty  Start Date End Date  
   
                                                      
  
  
Júnior Ahuja MD  
  
  
[address] [place] [address]  
  
  
[phone] (Work)  
  
  
[phone] (Fax) PCP - General                   10/22/02          
  
  
  
                      Team Member Relationship Specialty  Start Date End Date  
   
                                                      
  
  
Júnior Ahuja MD  
  
  
[address] [place] [address]  
  
  
[phone] (Work)  
  
  
[phone] (Fax) PCP - General                   10/22/02          
   
                                                      
  
  
Júnior Ahuja MD  
  
  
[address] [place] [address]  
  
  
[phone] (Work)  
  
  
[phone] (Fax) Referring       Internal Medicine 22           
   
                                                      
  
  
Júnior Ahuja MD  
  
  
[address] [place] [address]  
  
  
[phone] (Work)  
  
  
[phone] (Fax) Home Care Physician Internal Medicine 22           
  
  
  
                      Team Member Relationship Specialty  Start Date End Date  
   
                                                      
  
  
Júnior Ahuja MD  
  
  
[address] [place] [address]  
  
  
[phone] (Work)  
  
  
[phone] (Fax) PCP - General                   10/22/02          
   
                                                      
  
  
Júnior Ahuja MD  
  
  
[address] [place] [address]  
  
  
[phone] (Work)  
  
  
[phone] (Fax) Referring       Internal Medicine 22           
   
                                                      
  
  
Júnior Ahuja MD  
  
  
[address] [place] [address]  
  
  
[phone] (Work)  
  
  
[phone] (Fax) Home Care Physician Internal Medicine 22           
  
  
  
                      Team Member Relationship Specialty  Start Date End Date  
   
                                                      
  
  
Júnior Ahuja MD  
  
  
[address] [place] [address]  
  
  
[phone] (Work)  
  
  
[phone] (Fax) PCP - General                   10/22/02          
   
                                                      
  
  
Júnior Ahuja MD  
  
  
[address] [place] [address]  
  
  
[phone] (Work)  
  
  
[phone] (Fax) Referring       Internal Medicine 22           
   
                                                      
  
  
Júnior Ahuja MD  
  
  
[address] [place] [address]  
  
  
[phone] (Work)  
  
  
[phone] (Fax) Home Care Physician Internal Medicine 22           
  
  
  
                      Team Member Relationship Specialty  Start Date End Date  
   
                                                      
  
  
Júnior Ahuja MD  
  
  
[address] [place] [address]  
  
  
[phone] (Work)  
  
  
[phone] (Fax) PCP - General                   10/22/02          
   
                                                      
  
  
Júnior Ahuja MD  
  
  
[address] [place] [address]  
  
  
[phone] (Work)  
  
  
[phone] (Fax) Referring       Internal Medicine 22           
   
                                                      
  
  
Júnior Ahuja MD  
  
  
[address] [place] [address]  
  
  
[phone] (Work)  
  
  
[phone] (Fax) Home Care Physician Internal Medicine 22           
   
                                                      
  
  
Brenda Hernandez,   
PT  
  
  
[address] [place] [address]  
  
  
[phone] (Work) Home Care  Acute Care 22           
  
  
  
                      Team Member Relationship Specialty  Start Date End Date  
   
                                                      
  
  
Júnior Ahuja MD  
  
  
[address] [place] [address]  
  
  
[phone] (Work)  
  
  
[phone] (Fax) PCP - General                   10/22/02          
   
                                                      
  
  
Júnior Ahuja MD  
  
  
[address] [place] [address]  
  
  
[phone] (Work)  
  
  
[phone] (Fax) Referring       Internal Medicine 22           
   
                                                      
  
  
Júnior Ahuja MD  
  
  
[address] [place] [address]  
  
  
[phone] (Work)  
  
  
[phone] (Fax) Home Care Physician Internal Medicine 22           
   
                                                      
  
  
Brenda Hernandez,   
PT  
  
  
[address] [place] [address]  
  
  
[phone] (Work) Home Care  Acute Care 22           
  
  
  
                      Team Member Relationship Specialty  Start Date End Date  
   
                                                      
  
  
Júnior Ahuja MD  
  
  
[address] [place] [address]  
  
  
[phone] (Work)  
  
  
[phone] (Fax) PCP - General                   10/22/02          
   
                                                      
  
  
Júnior Ahuja MD  
  
  
[address] [place] [address]  
  
  
[phone] (Work)  
  
  
[phone] (Fax) Referring       Internal Medicine 22           
   
                                                      
  
  
Júnior Ahuja MD  
  
  
[address] [place] [address]  
  
  
[phone] (Work)  
  
  
[phone] (Fax) Home Care Physician Internal Medicine 22           
   
                                                      
  
  
Brenda Hernandez,   
PT  
  
  
[address] [place] [address]  
  
  
[phone] (Work) Home Care  Acute Care 22           
  
  
  
                      Team Member Relationship Specialty  Start Date End Date  
   
                                                      
  
  
Júnior Ahuja MD  
  
  
[address] [place] [address]  
  
  
[phone] (Work)  
  
  
[phone] (Fax) PCP - General                   10/22/02          
   
                                                      
  
  
Júnior Ahuja MD  
  
  
[address] [place] [address]  
  
  
[phone] (Work)  
  
  
[phone] (Fax) Referring       Internal Medicine 22           
   
                                                      
  
  
Júnior Ahuja MD  
  
  
[address] [place] [address]  
  
  
[phone] (Work)  
  
  
[phone] (Fax) Home Care Physician Internal Medicine 22           
   
                                                      
  
  
Brenda Hernandez,   
PT  
  
  
[address] [place] [address]  
  
  
[phone] (Work) Home Care  Acute Care 22           
  
  
  
                      Team Member Relationship Specialty  Start Date End Date  
   
                                                      
  
  
Júnior Ahuja MD  
  
  
[address] [place] [address]  
  
  
[phone] (Work)  
  
  
[phone] (Fax) PCP - General                   10/22/02          
   
                                                      
  
  
Júnior Ahuja MD  
  
  
[address] [place] [address]  
  
  
[phone] (Work)  
  
  
[phone] (Fax) Referring       Internal Medicine 22           
   
                                                      
  
  
Júnior Ahuja MD  
  
  
[address] [place] [address]  
  
  
[phone] (Work)  
  
  
[phone] (Fax) Home Care Physician Internal Medicine 22           
   
                                                      
  
  
Brenda Hernandez,   
PT  
  
  
[address] [facility] [place] [address]  
  
  
[phone] (Work) Home Care  Acute Care 22           
  
  
  
                      Team Member Relationship Specialty  Start Date End Date  
   
                                                      
  
  
Júnior Ahuja MD  
  
  
[address] [place] [address]  
  
  
[phone] (Work)  
  
  
[phone] (Fax) PCP - General                   10/22/02          
   
                                                      
  
  
Júnior Ahuja MD  
  
  
[address] [place] [address]  
  
  
[phone] (Work)  
  
  
[phone] (Fax) Referring       Internal Medicine 22           
   
                                                      
  
  
Júnior Ahuja MD  
  
  
[address] [place] [address]  
  
  
[phone] (Work)  
  
  
[phone] (Fax) Home Care Physician Internal Medicine 22           
   
                                                      
  
  
Brenda Hernandez,   
PT  
  
  
[address] [place] [address]  
  
  
[phone] (Work) Home Care  Acute Care 22           
  
  
  
                      Team Member Relationship Specialty  Start Date End Date  
   
                                                      
  
  
Júnior Ahuja MD  
  
  
[address] [place] [address]  
  
  
[phone] (Work)  
  
  
[phone] (Fax) PCP - General                   10/22/02          
   
                                                      
  
  
Júnior Ahuja MD  
  
  
[address] [place] [address]  
  
  
[phone] (Work)  
  
  
[phone] (Fax) Referring       Internal Medicine 22           
   
                                                      
  
  
Júnior Ahuja MD  
  
  
[address] [place] [address]  
  
  
[phone] (Work)  
  
  
[phone] (Fax) Home Care Provider Internal Medicine 22           
   
                                                      
  
  
Brenda Hernandez,   
PT  
  
  
[address] [place] [address]  
  
  
[phone] (Work) Home Care  Acute Care 22           
  
  
  
                      Team Member Relationship Specialty  Start Date End Date  
   
                                                      
  
  
Júnior Ahuja MD  
  
  
[address] [place] [address]  
  
  
[phone] (Work)  
  
  
[phone] (Fax) PCP - General                   10/22/02          
   
                                                      
  
  
Júnior Ahuja MD  
  
  
[address] [place] [address]  
  
  
[phone] (Work)  
  
  
[phone] (Fax) Referring       Internal Medicine 22           
   
                                                      
  
  
Júnior Ahuja MD  
  
  
[address] [place] [address]  
  
  
[phone] (Work)  
  
  
[phone] (Fax) Home Care Provider Internal Medicine 22           
   
                                                      
  
  
Brenda Hernandez,   
PT  
  
  
[address] [place] [address]  
  
  
[phone] (Work) Home Care  Acute Care 22           
  
  
  
                      Team Member Relationship Specialty  Start Date End Date  
   
                                                      
  
  
Júnior Ahuja MD  
  
  
[address] [place] [address]  
  
  
[phone] (Work)  
  
  
[phone] (Fax) PCP - General                   10/22/02          
   
                                                      
  
  
Júnior hAuja MD  
  
  
[address] [place] [address]  
  
  
[phone] (Work)  
  
  
[phone] (Fax) Referring       Internal Medicine 22           
   
                                                      
  
  
Júnior Ahuja MD  
  
  
[address] [place] [address]  
  
  
[phone] (Work)  
  
  
[phone] (Fax) Home Care Provider Internal Medicine 22           
   
                                                      
  
  
Brenda Hernandez,   
PT  
  
  
[address] [place] [address]  
  
  
[phone] (Work) Home Care  Acute Care 22           
  
  
  
                      Team Member Relationship Specialty  Start Date End Date  
   
                                                      
  
  
Júnior Ahuja MD  
  
  
[address] [place] [address]  
  
  
[phone] (Work)  
  
  
[phone] (Fax) PCP - General                   10/22/02          
   
                                                      
  
  
Júnior Ahuja MD  
  
  
[address] [place] [address]  
  
  
[phone] (Work)  
  
  
[phone] (Fax) Referring       Internal Medicine 22           
   
                                                      
  
  
Júnior Ahuja MD  
  
  
[address] [place] [address]  
  
  
[phone] (Work)  
  
  
[phone] (Fax) Home Care Provider Internal Medicine 22           
   
                                                      
  
  
Brenda Hernandez,   
PT  
  
  
[address] [place] [address]  
  
  
[phone] (Work) Home Care  Acute Care 22           
  
  
  
                      Team Member Relationship Specialty  Start Date End Date  
   
                                                      
  
  
Júnior Ahuja MD  
  
  
[address] [place] [address]  
  
  
[phone] (Work)  
  
  
[phone] (Fax) PCP - General                   10/22/02          
   
                                                      
  
  
Júnior Ahuja MD  
  
  
[address] [place] [address]  
  
  
[phone] (Work)  
  
  
[phone] (Fax) Referring       Internal Medicine 22           
   
                                                      
  
  
Júnior Ahuja MD  
  
  
[address] [place] [address]  
  
  
[phone] (Work)  
  
  
[phone] (Fax) Home Care Provider Internal Medicine 22           
   
                                                      
  
  
Brenda Hernandez,   
PT  
  
  
[address] [place] [address]  
  
  
[phone] (Work) Home Care  Acute Care 22           
  
  
  
                      Team Member Relationship Specialty  Start Date End Date  
   
                                                      
  
  
Júnior Ahuja MD  
  
  
[address] [place] [address]  
  
  
[phone] (Work)  
  
  
[phone] (Fax) PCP - General                   10/22/02          
   
                                                      
  
  
Júnior Ahuja MD  
  
  
[address] [place] [address]  
  
  
[phone] (Work)  
  
  
[phone] (Fax) Referring       Internal Medicine 22           
   
                                                      
  
  
Júnior hAuja MD  
  
  
[address] [place] [address]  
  
  
[phone] (Work)  
  
  
[phone] (Fax) Home Care Provider Internal Medicine 22           
   
                                                      
  
  
Brenda Hernandez,   
PT  
  
  
[address] [place] [address]  
  
  
[phone] (Work) Home Care  Acute Care 22           
  
  
  
                      Team Member Relationship Specialty  Start Date End Date  
   
                                                      
  
  
Júnior Ahuja MD  
  
  
[address] [place] [address]  
  
  
[phone] (Work)  
  
  
[phone] (Fax) PCP - General                   10/22/02          
   
                                                      
  
  
Júnior Ahuja MD  
  
  
[address] [place] [address]  
  
  
[phone] (Work)  
  
  
[phone] (Fax) Referring       Internal Medicine 22           
   
                                                      
  
  
Júnior Ahuja MD  
  
  
[address] [place] [address]  
  
  
[phone] (Work)  
  
  
[phone] (Fax) Home Care Provider Internal Medicine 22           
   
                                                      
  
  
Brenda Hernandez,   
PT  
  
  
[address] [place] [address]  
  
  
[phone] (Work) Home Care  Acute Care 22           
  
  
  
                      Team Member Relationship Specialty  Start Date End Date  
   
                                                      
  
  
Júnior Ahuja MD  
  
  
[address] [place] [address]  
  
  
[phone] (Work)  
  
  
[phone] (Fax) PCP - General                   10/22/02          
   
                                                      
  
  
Júnior Ahuja MD  
  
  
[address] [place] [address]  
  
  
[phone] (Work)  
  
  
[phone] (Fax) Referring       Internal Medicine 22           
   
                                                      
  
  
Júnior Ahuja MD  
  
  
[address] [place] [address]  
  
  
[phone] (Work)  
  
  
[phone] (Fax) Home Care Provider Internal Medicine 22           
   
                                                      
  
  
Brenda Hernandez,   
PT  
  
  
[address] [place] [address]  
  
  
[phone] (Work) Home Care  Acute Care 22           
  
  
  
                      Team Member Relationship Specialty  Start Date End Date  
   
                                                      
  
  
Júnior Ahuja MD  
  
  
[address] [place] [address]  
  
  
[phone] (Work)  
  
  
[phone] (Fax) PCP - General                   10/22/02          
   
                                                      
  
  
Júnior Ahuja MD  
  
  
[address] [place] [address]  
  
  
[phone] (Work)  
  
  
[phone] (Fax) Referring       Internal Medicine 22           
   
                                                      
  
  
Júnior Ahuja MD  
  
  
[address] [place] [address]  
  
  
[phone] (Work)  
  
  
[phone] (Fax) Home Care Provider Internal Medicine 22           
   
                                                      
  
  
Brenda Hernandez,   
PT  
  
  
[address] [place] [address]  
  
  
[phone] (Work) Home Care  Acute Care 22           
  
  
  
                      Team Member Relationship Specialty  Start Date End Date  
   
                                                      
  
  
Júnior Ahuja MD  
  
  
[address] [place] [address]  
  
  
[phone] (Work)  
  
  
[phone] (Fax) PCP - General                   10/22/02          
   
                                                      
  
  
Júnior Ahuja MD  
  
  
[address] [place] [address]  
  
  
[phone] (Work)  
  
  
[phone] (Fax) Referring       Internal Medicine 22           
   
                                                      
  
  
Júnior Ahuja MD  
  
  
[address] [place] [address]  
  
  
[phone] (Work)  
  
  
[phone] (Fax) Home Care Provider Internal Medicine 22           
   
                                                      
  
  
Brenda Hernandez,   
PT  
  
  
[address] [place] [address]  
  
  
[phone] (Work) Home Care  Acute Care 22           
  
  
  
                      Team Member Relationship Specialty  Start Date End Date  
   
                                                      
  
  
Júnior Ahuja MD  
  
  
[address] [place] [address]  
  
  
[phone] (Work)  
  
  
[phone] (Fax) PCP - General                   10/22/02          
   
                                                      
  
  
Júnior Ahuja MD  
  
  
[address] [place] [address]  
  
  
[phone] (Work)  
  
  
[phone] (Fax) Referring       Internal Medicine 22           
   
                                                      
  
  
Júnior Ahuja MD  
  
  
[address] [place] [address]  
  
  
[phone] (Work)  
  
  
[phone] (Fax) Home Care Provider Internal Medicine 22           
   
                                                      
  
  
Brenda Hernandez,   
PT  
  
  
[address] [place] [address]  
  
  
[phone] (Work) Home Care  Acute Care 22           
  
  
  
                      Team Member Relationship Specialty  Start Date End Date  
   
                                                      
  
  
Júnior Ahuja MD  
  
  
[address] [place] [address]  
  
  
[phone] (Work)  
  
  
[phone] (Fax) PCP - General                   10/22/02          
   
                                                      
  
  
Júnior Ahuja MD  
  
  
[address] [place] [address]  
  
  
[phone] (Work)  
  
  
[phone] (Fax) Referring       Internal Medicine 22           
   
                                                      
  
  
Júnior Ahuja MD  
  
  
[address] [place] [address]  
  
  
[phone] (Work)  
  
  
[phone] (Fax) Home Care Provider Internal Medicine 22           
   
                                                      
  
  
Brenda Hernandez,   
PT  
  
  
[address] [place] [address]  
  
  
[phone] (Work) Home Care  Acute Care 22           
  
  
  
                      Team Member Relationship Specialty  Start Date End Date  
   
                                                      
  
  
Júniro Ahuja MD  
  
  
[address] [place] [address]  
  
  
[phone] (Work)  
  
  
[phone] (Fax) PCP - General                   10/22/02          
   
                                                      
  
  
Júnior Ahuja MD  
  
  
[address] [place] [address]  
  
  
[phone] (Work)  
  
  
[phone] (Fax) Referring       Internal Medicine 22           
   
                                                      
  
  
Júnior Ahuja MD  
  
  
[address] [place] [address]  
  
  
[phone] (Work)  
  
  
[phone] (Fax) Home Care Provider Internal Medicine 22           
   
                                                      
  
  
Brenda Hernandez,   
PT  
  
  
[address] [place] [address]  
  
  
[phone] (Work) Home Care  Acute Care 22           
  
  
  
                      Team Member Relationship Specialty  Start Date End Date  
   
                                                      
  
  
Júnior Ahuja MD  
  
  
[address] [place] [address]  
  
  
[phone] (Work)  
  
  
[phone] (Fax) PCP - General                   10/22/02          
   
                                                      
  
  
Júnior Ahuja MD  
  
  
[address] [place] [address]  
  
  
[phone] (Work)  
  
  
[phone] (Fax) Referring       Internal Medicine 22           
   
                                                      
  
  
Júnior Ahuja MD  
  
  
[address] [place] [address]  
  
  
[phone] (Work)  
  
  
[phone] (Fax) Home Care Provider Internal Medicine 22           
   
                                                      
  
  
Brenda Hernandez,   
PT  
  
  
[address] [place] [address]  
  
  
[phone] (Work) Home Care  Acute Care 22           
  
  
  
                      Team Member Relationship Specialty  Start Date End Date  
   
                                                      
  
  
Júnior Ahuja MD  
  
  
[address] [place] [address]  
  
  
[phone] (Work)  
  
  
[phone] (Fax) PCP - General                   10/22/02          
   
                                                      
  
  
Júnior Ahuja MD  
  
  
[address] [place] [address]  
  
  
[phone] (Work)  
  
  
[phone] (Fax) Referring       Internal Medicine 22           
   
                                                      
  
  
Júnior Ahuja MD  
  
  
[address] [place] [address]  
  
  
[phone] (Work)  
  
  
[phone] (Fax) Home Care Provider Internal Medicine 22           
   
                                                      
  
  
Brenda Hernandez,   
PT  
  
  
[address] [place] [address]  
  
  
[phone] (Work) Home Care  Acute Care 22           
  
  
  
                      Team Member Relationship Specialty  Start Date End Date  
   
                                                      
  
  
Júnior Ahuja MD  
  
  
[address] [place] [address]  
  
  
[phone] (Work)  
  
  
[phone] (Fax) PCP - General                   10/22/02          
   
                                                      
  
  
Júnior Ahuja MD  
  
  
[address] [place] [address]  
  
  
[phone] (Work)  
  
  
[phone] (Fax) Referring       Internal Medicine 22           
   
                                                      
  
  
Júnior Ahuja MD  
  
  
[address] [place] [address]  
  
  
[phone] (Work)  
  
  
[phone] (Fax) Home Care Provider Internal Medicine 22           
   
                                                      
  
  
Brenda Hernandez,   
PT  
  
  
[address] [place] [address]  
  
  
[phone] (Work) Home Care  Acute Care 22           
  
  
  
                      Team Member Relationship Specialty  Start Date End Date  
   
                                                      
  
  
Júnior Ahuja MD  
  
  
[address] [place] [address]  
  
  
[phone] (Work)  
  
  
[phone] (Fax) PCP - General                   10/22/02          
   
                                                      
  
  
Júnior Ahuja MD  
  
  
[address] [place] [address]  
  
  
[phone] (Work)  
  
  
[phone] (Fax) Referring       Internal Medicine 22           
   
                                                      
  
  
Júnior Ahuja MD  
  
  
[address] [place] [address]  
  
  
[phone] (Work)  
  
  
[phone] (Fax) Home Care Provider Internal Medicine 22           
   
                                                      
  
  
Brenda Hernandez,   
PT  
  
  
[address] [place] [address]  
  
  
[phone] (Work) Home Care  Acute Care 22           
  
  
  
                      Team Member Relationship Specialty  Start Date End Date  
   
                                                      
  
  
Júnior Ahuja MD  
  
  
[address] [place] [address]  
  
  
[phone] (Work)  
  
  
[phone] (Fax) PCP - General                   10/22/02          
   
                                                      
  
  
Júnior Ahuja MD  
  
  
[address] [place] [address]  
  
  
[phone] (Work)  
  
  
[phone] (Fax) Referring       Internal Medicine 22           
   
                                                      
  
  
Júnior Ahuja MD  
  
  
[address] [place] [address]  
  
  
[phone] (Work)  
  
  
[phone] (Fax) Home Care Provider Internal Medicine 22           
   
                                                      
  
  
Brenda Hernandez,   
PT  
  
  
[address] [place] [address]  
  
  
[phone] (Work) Home Care  Acute Care 22           
  
  
  
                      Team Member Relationship Specialty  Start Date End Date  
   
                                                      
  
  
Júnior Ahuja MD  
  
  
[address] [place] [address]  
  
  
[phone] (Work)  
  
  
[phone] (Fax) PCP - General                   10/22/02          
   
                                                      
  
  
Júnior Ahuja MD  
  
  
[address] [place] [address]  
  
  
[phone] (Work)  
  
  
[phone] (Fax) Referring       Internal Medicine 22           
   
                                                      
  
  
Júnior Ahuja MD  
  
  
[address] [place] [address]  
  
  
[phone] (Work)  
  
  
[phone] (Fax) Home Care Provider Internal Medicine 22           
   
                                                      
  
  
Brenda Hernandez,   
PT  
  
  
[address] [place] [address]  
  
  
[phone] (Work) Home Care  Acute Care 22           
  
  
  
                      Team Member Relationship Specialty  Start Date End Date  
   
                                                      
  
  
Júnior Ahuja MD  
  
  
[address] [place] [address]  
  
  
[phone] (Work)  
  
  
[phone] (Fax) PCP - General                   10/22/02          
   
                                                      
  
  
Júnior Ahuja MD  
  
  
[address] [place] [address]  
  
  
[phone] (Work)  
  
  
[phone] (Fax) Referring       Internal Medicine 22           
   
                                                      
  
  
Júnior Ahuja MD  
  
  
[address] [place] [address]  
  
  
[phone] (Work)  
  
  
[phone] (Fax) Home Care Provider Internal Medicine 22           
   
                                                      
  
  
Brenda Hernandez,   
PT  
  
  
[address] [place] [address]  
  
  
[phone] (Work) Home Care  Acute Care 22           
  
  
  
                      Team Member Relationship Specialty  Start Date End Date  
   
                                                      
  
  
Júnior Ahuja MD  
  
  
[address] [place] [address]  
  
  
[phone] (Work)  
  
  
[phone] (Fax) PCP - General                   10/22/02          
   
                                                      
  
  
Júnior Ahuja MD  
  
  
[address] [place] [address]  
  
  
[phone] (Work)  
  
  
[phone] (Fax) Referring       Internal Medicine 22           
   
                                                      
  
  
Júnior Ahuja MD  
  
  
[address] [place] [address]  
  
  
[phone] (Work)  
  
  
[phone] (Fax) Home Care Provider Internal Medicine 22           
   
                                                      
  
  
Brenda Hernandez,   
PT  
  
  
[address] [place] [address]  
  
  
[phone] (Work) Home Care  Acute Care 22           
  
  
  
                      Team Member Relationship Specialty  Start Date End Date  
   
                                                      
  
  
Júnior Ahuja MD  
  
  
[address] [place] [address]  
  
  
[phone] (Work)  
  
  
[phone] (Fax) PCP - General                   10/22/02          
   
                                                      
  
  
Júnior Ahuja MD  
  
  
[address] [place] [address]  
  
  
[phone] (Work)  
  
  
[phone] (Fax) Referring       Internal Medicine 22           
   
                                                      
  
  
Júnior Ahuja MD  
  
  
[address] [place] [address]  
  
  
[phone] (Work)  
  
  
[phone] (Fax) Home Care Provider Internal Medicine 22           
   
                                                      
  
  
Brenda Hernandez,   
PT  
  
  
[address] [place] [address]  
  
  
[phone] (Work) Home Care  Acute Care 22           
  
  
  
                      Team Member Relationship Specialty  Start Date End Date  
   
                                                      
  
  
Júnior Ahuja MD  
  
  
[address] [place] [address]  
  
  
[phone] (Work)  
  
  
[phone] (Fax) PCP - General                   10/22/02          
   
                                                      
  
  
Júnior Ahuja MD  
  
  
[address] [place] [address]  
  
  
[phone] (Work)  
  
  
[phone] (Fax) Referring       Internal Medicine 22           
   
                                                      
  
  
Júnior Ahuja MD  
  
  
[address] [place] [address]  
  
  
[phone] (Work)  
  
  
[phone] (Fax) Home Care Provider Internal Medicine 22           
   
                                                      
  
  
Brenda Hernandez,   
PT  
  
  
[address] [place] [address]  
  
  
[phone] (Work) Home Care  Acute Care 22           
  
  
  
                      Team Member Relationship Specialty  Start Date End Date  
   
                                                      
  
  
Júnior Ahuja MD  
  
  
[address] [place] [address]  
  
  
[phone] (Work)  
  
  
[phone] (Fax) PCP - General                   10/22/02          
   
                                                      
  
  
Júnior Ahuja MD  
  
  
[address] [place] [address]  
  
  
[phone] (Work)  
  
  
[phone] (Fax) Referring       Internal Medicine 22           
   
                                                      
  
  
Júnior Ahuja MD  
  
  
[address] [place] [address]  
  
  
[phone] (Work)  
  
  
[phone] (Fax) Home Care Provider Internal Medicine 22           
   
                                                      
  
  
Brenda Hernandez,   
PT  
  
  
[address] [place] [address]  
  
  
[phone] (Work) Home Care  Acute Care 22           
   
                                                      
  
  
Maurizio Cochran PA-C  
  
  
[address]  
  
  
[place] [address]  
  
  
[phone] (Work)  
  
  
[phone] (Fax)                 Gastroenterology 23           
   
                                                      
  
  
Nathalia Bernal PA-C  
  
  
[address] [place] [address]  
  
  
[phone] (Work)  
  
  
[phone] (Fax)                 General Surgery 23           
  
  
  
                      Team Member Relationship Specialty  Start Date End Date  
   
                                                      
  
  
Júnior Ahuja MD  
  
  
[address] [place] [address]  
  
  
[phone] (Work)  
  
  
[phone] (Fax) PCP - General                   10/22/02          
   
                                                      
  
  
Júnior Ahuja MD  
  
  
[address] [place] [address]  
  
  
[phone] (Work)  
  
  
[phone] (Fax) Referring       Internal Medicine 22           
   
                                                      
  
  
Júnior Ahuja MD  
  
  
[address] [place] [address]  
  
  
[phone] (Work)  
  
  
[phone] (Fax) Home Care Provider Internal Medicine 22           
   
                                                      
  
  
Brenda Hernandez,   
PT  
  
  
[address] [place] [address]  
  
  
[phone] (Work) Home Care  Acute Care 22           
   
                                                      
  
  
Maurizio Cochran PA-C  
  
  
[address] [place] [address]  
  
  
[phone] (Work)  
  
  
[phone] (Fax)                 Gastroenterology 23           
   
                                                      
  
  
Nathalia Bernal PA-C  
  
  
[address] [facility].  
  
  
[place] [address]  
  
  
[phone] (Work)  
  
  
[phone] (Fax)                 General Surgery 23           
  
  
  
                      Team Member Relationship Specialty  Start Date End Date  
   
                                                      
  
  
Júnior Ahuja MD  
  
  
 [place] [address]  
  
  
[phone] (Work)  
  
  
[phone] (Fax) PCP - General                   10/22/02          
   
                                                      
  
  
Júnior Ahuja MD  
  
  
 [place] [address]  
  
  
[phone] (Work)  
  
  
[phone] (Fax) Referring       Internal Medicine 22           
   
                                                      
  
  
Júnior Ahuja MD  
  
  
[address] [place] [address]  
  
  
[phone] (Work)  
  
  
[phone] (Fax) Home Care Provider Internal Medicine 22           
   
                                                      
  
  
Brenda Hernandez,   
PT  
  
  
[address] [place] [address]  
  
  
[phone] (Work) Home Care  Acute Care 22           
   
                                                      
  
  
Maurizio Cochran PA-C  
  
  
[address] [place] [address]  
  
  
[phone] (Work)  
  
  
[phone] (Fax)                 Gastroenterology 23           
   
                                                      
  
  
Nathalia Bernal PA-C  
  
  
[address]  
  
  
[place] [address]  
  
  
[phone] (Work)  
  
  
[phone] (Fax)                 General Surgery 23           
  
  
  
                      Team Member Relationship Specialty  Start Date End Date  
   
                                                      
  
  
Júnior Ahuja MD  
  
  
[address] [place] [address]  
  
  
[phone] (Work)  
  
  
[phone] (Fax) PCP - General                   10/22/02          
   
                                                      
  
  
Júnior Ahuja MD  
  
  
[address] [place] [address]  
  
  
[phone] (Work)  
  
  
[phone] (Fax) Referring       Internal Medicine 22           
   
                                                      
  
  
Júnior Ahuja MD  
  
  
[address] [place] [address]  
  
  
[phone] (Work)  
  
  
[phone] (Fax) Home Care Provider Internal Medicine 22           
   
                                                      
  
  
Brenda Hernandez,   
PT  
  
  
[address] [place] [address]  
  
  
[phone] (Work) Home Care  Acute Care 22           
   
                                                      
  
  
Maurizio Cochran PA-C  
  
  
[address] [place] [address]  
  
  
[phone] (Work)  
  
  
[phone] (Fax)                 Gastroenterology 23           
   
                                                      
  
  
Nathalia Bernal PA-C  
  
  
[address]  
  
  
[place] [address]  
  
  
[phone] (Work)  
  
  
[phone] (Fax)                 General Surgery 23           
  
  
  
                      Team Member Relationship Specialty  Start Date End Date  
   
                                                      
  
  
Júnior Ahuja MD  
  
  
[address] [place] [address]  
  
  
[phone] (Work)  
  
  
[phone] (Fax) PCP - General                   10/22/02          
   
                                                      
  
  
Júnior Ahuja MD  
  
  
[address] [place] [address]  
  
  
[phone] (Work)  
  
  
[phone] (Fax) Referring       Internal Medicine 22           
   
                                                      
  
  
Júnior Ahuja MD  
  
  
[address] [place] [address]  
  
  
[phone] (Work)  
  
  
[phone] (Fax) Home Care Provider Internal Medicine 22           
   
                                                      
  
  
Brenda Hernandez,   
PT  
  
  
[address] [place] [address]  
  
  
[phone] (Work) Home Care  Acute Care 22           
   
                                                      
  
  
Maurizio Cochran PA-C  
  
  
[address] [place] [address]  
  
  
[phone] (Work)  
  
  
[phone] (Fax)                 Gastroenterology 23           
   
                                                      
  
  
Nathalia Bernal PA-C  
  
  
[address]  
  
  
[place] [address]  
  
  
[phone] (Work)  
  
  
[phone] (Fax)                 General Surgery 23           
  
  
  
                      Team Member Relationship Specialty  Start Date End Date  
   
                                                      
  
  
Júnior Ahuja MD  
  
  
[address] [place] [address]  
  
  
[phone] (Work)  
  
  
[phone] (Fax) PCP - General                   10/22/02          
   
                                                      
  
  
Júnior Ahuja MD  
  
  
[address] [place] [address]  
  
  
[phone] (Work)  
  
  
[phone] (Fax) Referring       Internal Medicine 22           
   
                                                      
  
  
Júnior Ahuja MD  
  
  
[address] [place] [address]  
  
  
[phone] (Work)  
  
  
[phone] (Fax) Home Care Provider Internal Medicine 22           
   
                                                      
  
  
Brenda Hernandez,   
PT  
  
  
[address] [place] [address]  
  
  
[phone] (Work) Home Care  Acute Care 22           
   
                                                      
  
  
Maurizio Cochran PA-C  
  
  
[address] [place] [address]  
  
  
[phone] (Work)  
  
  
[phone] (Fax)                 Gastroenterology 23           
   
                                                      
  
  
Nathalia Bernal PA-C  
  
  
[address]  
  
  
[place] [address]  
  
  
[phone] (Work)  
  
  
[phone] (Fax)                 General Surgery 23           
  
  
  
                      Team Member Relationship Specialty  Start Date End Date  
   
                                                      
  
  
Júnior Ahuja MD  
  
  
[address] [place] [address]  
  
  
[phone] (Work)  
  
  
[phone] (Fax) PCP - General                   10/22/02          
   
                                                      
  
  
Júnior Ahuja MD  
  
  
[address] [place] [address]  
  
  
[phone] (Work)  
  
  
[phone] (Fax) Referring       Internal Medicine 22           
   
                                                      
  
  
Júnior Ahuja MD  
  
  
[address] [place] [address]  
  
  
[phone] (Work)  
  
  
[phone] (Fax) Home Care Provider Internal Medicine 22           
   
                                                      
  
  
Brenda Hernandez,   
PT  
  
  
[address] [place] [address]  
  
  
[phone] (Work) Home Care  Acute Care 22           
   
                                                      
  
  
Maurizio Cochran PA-C  
  
  
[address] [place] [address]  
  
  
[phone] (Work)  
  
  
[phone] (Fax)                 Gastroenterology 23           
   
                                                      
  
  
Nathalia Bernal PA-C  
  
  
[address] [facility] [place] [address]  
  
  
[phone] (Work)  
  
  
[phone] (Fax)                 General Surgery 23           
  
  
  
                      Team Member Relationship Specialty  Start Date End Date  
   
                                                      
  
  
Júnior Ahuja MD  
  
  
[address] [place] [address]  
  
  
[phone] (Work)  
  
  
[phone] (Fax) PCP - General                   10/22/02          
   
                                                      
  
  
Júnior Ahuja MD  
  
  
[address] [place] [address]  
  
  
[phone] (Work)  
  
  
[phone] (Fax) Referring       Internal Medicine 22           
   
                                                      
  
  
Júnior Ahuja MD  
  
  
[address] [place] [address]  
  
  
[phone] (Work)  
  
  
[phone] (Fax) Home Care Provider Internal Medicine 22           
   
                                                      
  
  
Brenda Hernandez,   
PT  
  
  
[address] [place] [address]  
  
  
[phone] (Work) Home Care  Acute Care 22           
   
                                                      
  
  
Maurizio Cochran PA-C  
  
  
[address] [place] [address]  
  
  
[phone] (Work)  
  
  
[phone] (Fax)                 Gastroenterology 23           
   
                                                      
  
  
Nathalia Bernal PA-C  
  
  
[address]  
  
  
[place] [address]  
  
  
[phone] (Work)  
  
  
[phone] (Fax)                 General Surgery 23           
  
  
  
                      Team Member Relationship Specialty  Start Date End Date  
   
                                                      
  
  
Júnior Ahuja MD  
  
  
[address] [place] [address]  
  
  
[phone] (Work)  
  
  
[phone] (Fax) PCP - General                   10/22/02          
   
                                                      
  
  
Júnior Ahuja MD  
  
  
[address] [place] [address]  
  
  
[phone] (Work)  
  
  
[phone] (Fax) Referring       Internal Medicine 22           
   
                                                      
  
  
Júnior Ahuja MD  
  
  
[address] [place] [address]  
  
  
[phone] (Work)  
  
  
[phone] (Fax) Home Care Provider Internal Medicine 22           
   
                                                      
  
  
Brenda Hernandez,   
PT  
  
  
[address] [place] [address]  
  
  
[phone] (Work) Home Care  Acute Care 22           
   
                                                      
  
  
Maurizio Cochran PA-C  
  
  
[address] [place] [address]  
  
  
[phone] (Work)  
  
  
[phone] (Fax)                 Gastroenterology 23           
   
                                                      
  
  
Nathalia Bernal PA-C  
  
  
[address] [place] [address]  
  
  
[phone] (Work)  
  
  
[phone] (Fax)                 General Surgery 23           
  
  
  
                      Team Member Relationship Specialty  Start Date End Date  
   
                                                      
  
  
Júnior Ahuja MD  
  
  
[address] [place] [address]  
  
  
[phone] (Work)  
  
  
[phone] (Fax) PCP - General                   10/22/02          
   
                                                      
  
  
Júnior Ahuja MD  
  
  
[address] [place] [address]  
  
  
[phone] (Work)  
  
  
[phone] (Fax) Referring       Internal Medicine 22           
   
                                                      
  
  
Júnior Ahuja MD  
  
  
[address] [place] [address]  
  
  
[phone] (Work)  
  
  
[phone] (Fax) Home Care Provider Internal Medicine 22           
   
                                                      
  
  
Brenda Hernandez,   
PT  
  
  
[address] [place] [address]  
  
  
[phone] (Work) Home Care  Acute Care 22           
   
                                                      
  
  
Maurizio Cochran PA-C  
  
  
[address] [place] [address]  
  
  
[phone] (Work)  
  
  
[phone] (Fax)                 Gastroenterology 23           
   
                                                      
  
  
Nathalia Bernal PA-C  
  
  
[address]  
  
  
[place] [address]  
  
  
[phone] (Work)  
  
  
[phone] (Fax)                 General Surgery 23           
  
  
  
                      Team Member Relationship Specialty  Start Date End Date  
   
                                                      
  
  
Júnior Ahuja MD  
  
  
[address] [place] [address]  
  
  
[phone] (Work)  
  
  
[phone] (Fax) PCP - General                   10/22/02          
   
                                                      
  
  
Júnior Ahuja MD  
  
  
[address] [place] [address]  
  
  
[phone] (Work)  
  
  
[phone] (Fax) Referring       Internal Medicine 22           
   
                                                      
  
  
Júnior Ahuja MD  
  
  
[address] [place] [address]  
  
  
[phone] (Work)  
  
  
[phone] (Fax) Home Care Provider Internal Medicine 22           
   
                                                      
  
  
Brenda Hernandez,   
PT  
  
  
[address] [place] [address]  
  
  
[phone] (Work) Home Care  Acute Care 22           
   
                                                      
  
  
Maurizio Cochran PA-C  
  
  
[address] [place] [address]  
  
  
[phone] (Work)  
  
  
[phone] (Fax)                 Gastroenterology 23           
   
                                                      
  
  
Nathalia Bernal PA-C  
  
  
[address]  
  
  
[place] [address]  
  
  
[phone] (Work)  
  
  
[phone] (Fax)                 General Surgery 23           
  
  
  
                      Team Member Relationship Specialty  Start Date End Date  
   
                                                      
  
  
Júnior Ahuja MD  
  
  
[address] [place] [address]  
  
  
[phone] (Work)  
  
  
[phone] (Fax) PCP - General                   10/22/02          
   
                                                      
  
  
Júnior Ahuja MD  
  
  
[address] [place] [address]  
  
  
[phone] (Work)  
  
  
[phone] (Fax) Referring       Internal Medicine 22           
   
                                                      
  
  
Júnior Ahuja MD  
  
  
[address] [place] [address]  
  
  
[phone] (Work)  
  
  
[phone] (Fax) Home Care Provider Internal Medicine 22           
   
                                                      
  
  
Brenda Hernandez,   
PT  
  
  
[address] [place] [address]  
  
  
[phone] (Work) Home Care  Acute Care 22           
   
                                                      
  
  
Maurizio Cochran PA-C  
  
  
[address] [place] [address]  
  
  
[phone] (Work)  
  
  
[phone] (Fax)                 Gastroenterology 23           
   
                                                      
  
  
Nathalia Bernal PA-C  
  
  
[address] [place] [address]  
  
  
[phone] (Work)  
  
  
[phone] (Fax)                 General Surgery 23           
  
  
  
                      Team Member Relationship Specialty  Start Date End Date  
   
                                                      
  
  
Júnior Ahuja MD  
  
  
[address] [place] [address]  
  
  
[phone] (Work)  
  
  
[phone] (Fax) PCP - General                   10/22/02          
   
                                                      
  
  
Júnior Ahuja MD  
  
  
[address] [place] [address]  
  
  
[phone] (Work)  
  
  
[phone] (Fax) Referring       Internal Medicine 22           
   
                                                      
  
  
Júnior Ahuja MD  
  
  
[address] [place] [address]  
  
  
[phone] (Work)  
  
  
[phone] (Fax) Home Care Provider Internal Medicine 22           
   
                                                      
  
  
Brenda Hernandez,   
PT  
  
  
[address] [place] [address]  
  
  
[phone] (Work) Home Care  Acute Care 22           
   
                                                      
  
  
Maurizio Cochran PA-C  
  
  
[address] [place] [address]  
  
  
[phone] (Work)  
  
  
[phone] (Fax)                 Gastroenterology 23           
   
                                                      
  
  
Nathalia Bernal PA-C  
  
  
[address]  
  
  
[place] [address]  
  
  
[phone] (Work)  
  
  
[phone] (Fax)                 General Surgery 23           
  
  
  
                      Team Member Relationship Specialty  Start Date End Date  
   
                                                      
  
  
Júnior Ahuja MD  
  
  
[address] [place] [address]  
  
  
[phone] (Work)  
  
  
[phone] (Fax) PCP - General                   10/22/02          
   
                                                      
  
  
Júnior Ahuja MD  
  
  
[address] [place] [address]  
  
  
[phone] (Work)  
  
  
[phone] (Fax) Referring       Internal Medicine 22           
   
                                                      
  
  
Júnior Ahuja MD  
  
  
[address] [place] [address]  
  
  
[phone] (Work)  
  
  
[phone] (Fax) Home Care Provider Internal Medicine 22           
   
                                                      
  
  
Brenda Hernandez,   
PT  
  
  
[address] [place] [address]  
  
  
[phone] (Work) Home Care  Acute Care 22           
   
                                                      
  
  
Maurizio Cochran PA-C  
  
  
[address] [place] [address]  
  
  
[phone] (Work)  
  
  
[phone] (Fax)                 Gastroenterology 23           
   
                                                      
  
  
Nathalia Bernal PA-C  
  
  
[address]  
  
  
[place] [address]  
  
  
[phone] (Work)  
  
  
[phone] (Fax)                 General Surgery 23           
  
  
  
                      Team Member Relationship Specialty  Start Date End Date  
   
                                                      
  
  
Júnior Ahuja MD  
  
  
NPI: [id number]  
  
  
[address] [place] [address]  
  
  
[phone] (Work)  
  
  
[phone] (Fax) PCP - General                   10/22/02          
   
                                                      
  
  
Júnior Ahuja MD  
  
  
NPI: [id number]  
  
  
[address] [place] [address]  
  
  
[phone] (Work)  
  
  
[phone] (Fax) Referring       Internal Medicine 22           
   
                                                      
  
  
Júnior Ahuja MD  
  
  
NPI: [id number]  
  
  
[address] [place] [address]  
  
  
[phone] (Work)  
  
  
[phone] (Fax) Home Care Provider Internal Medicine 22           
   
                                                      
  
  
Brenda Hernandez,   
PT  
  
  
[address] [place] [address]  
  
  
[phone] (Work) Home Care  Acute Care 22           
   
                                                      
  
  
Maurizio Cochran PA-C  
  
  
NPI: [id number]  
  
  
[address] [place] [address]  
  
  
[phone] (Work)  
  
  
[phone] (Fax)                 Gastroenterology 23           
   
                                                      
  
  
Nathalia Bernal PA-C  
  
  
NPI: [id number]  
  
  
[address]  
  
  
[place] [address]  
  
  
[phone] (Work)  
  
  
[phone] (Fax)                 General Surgery 23           
  
  
  
                      Team Member Relationship Specialty  Start Date End Date  
   
                                                      
  
  
Júnior Ahuja MD  
  
  
NPI: [id number]  
  
  
[address] [place] [address]  
  
  
[phone] (Work)  
  
  
[phone] (Fax) PCP - General                   10/22/02          
   
                                                      
  
  
Júnior Ahuja MD  
  
  
NPI: [id number]  
  
  
[address] [place] [address]  
  
  
[phone] (Work)  
  
  
[phone] (Fax) Referring       Internal Medicine 22           
   
                                                      
  
  
Júnior Ahuja MD  
  
  
NPI: [id number]  
  
  
[address] [place] [address]  
  
  
[phone] (Work)  
  
  
[phone] (Fax) Home Care Provider Internal Medicine 22           
   
                                                      
  
  
Brenda Hernandez,   
PT  
  
  
[address] [place] [address]  
  
  
[phone] (Work) Home Care  Acute Care 22           
   
                                                      
  
  
Maurizio Cochran PA-C  
  
  
NPI: [id number]  
  
  
[address]  
  
  
[place] [address]  
  
  
[phone] (Work)  
  
  
[phone] (Fax)                 Gastroenterology 23           
   
                                                      
  
  
Nathalia Bernal PA-C  
  
  
NPI: [id number]  
  
  
[address] [address]  
  
  
[phone] (Work)  
  
  
[phone] (Fax)                 General Surgery 23           
  
  
  
                      Team Member Relationship Specialty  Start Date End Date  
   
                                                      
  
  
Júnior Ahuja MD  
  
  
NPI: [id number]  
  
  
[address] [place] [address]  
  
  
[phone] (Work)  
  
  
[phone] (Fax) PCP - General                   10/22/02          
   
                                                      
  
  
Júnior Ahuja MD  
  
  
NPI: [id number]  
  
  
[address] [place] [address]  
  
  
[phone] (Work)  
  
  
[phone] (Fax) Referring       Internal Medicine 22           
   
                                                      
  
  
Júnior Ahuja MD  
  
  
NPI: [id number]  
  
  
[address] [place] [address]  
  
  
[phone] (Work)  
  
  
[phone] (Fax) Home Care Provider Internal Medicine 22           
   
                                                      
  
  
Brenda Hernandez,   
PT  
  
  
[address] [place] [address]  
  
  
[phone] (Work) Home Care  Acute Care 22           
   
                                                      
  
  
Maurizio Cochran PA-C  
  
  
NPI: [id number]  
  
  
[address] [place] [address]  
  
  
[phone] (Work)  
  
  
[phone] (Fax)                 Gastroenterology 23           
   
                                                      
  
  
Nathalia Bernal PA-C  
  
  
NPI: [id number]  
  
  
[address] [place] [address]  
  
  
[phone] (Work)  
  
  
[phone] (Fax)                 General Surgery 23           
  
  
  
                      Team Member Relationship Specialty  Start Date End Date  
   
                                                      
  
  
Júnior Ahuja MD  
  
  
NPI: [id number]  
  
  
[address] [place] [address]  
  
  
[phone] (Work)  
  
  
[phone] (Fax) PCP - General                   10/22/02          
   
                                                      
  
  
Júnior Ahuja MD  
  
  
NPI: [id number]  
  
  
[address] [place] [address]  
  
  
[phone] (Work)  
  
  
[phone] (Fax) Referring       Internal Medicine 22           
   
                                                      
  
  
Júnior Ahuja MD  
  
  
NPI: [id number]  
  
  
[address] [place] [address]  
  
  
[phone] (Work)  
  
  
[phone] (Fax) Home Care Provider Internal Medicine 22           
   
                                                      
  
  
Brenda Hernandez,   
PT  
  
  
[address] [place] [address]  
  
  
[phone] (Work) Home Care  Acute Care 22           
   
                                                      
  
  
Maurizio Cochran PA-C  
  
  
NPI: [id number]  
  
  
[address]  
  
  
[place] [address]  
  
  
[phone] (Work)  
  
  
[phone] (Fax)                 Gastroenterology 23           
   
                                                      
  
  
Nathalia Bernal PA-C  
  
  
NPI: [id number]  
  
  
[address] [place] [address]  
  
  
[phone] (Work)  
  
  
[phone] (Fax)                 General Surgery 23           
  
  
  
                      Team Member Relationship Specialty  Start Date End Date  
   
                                                      
  
  
Júnior Ahuja MD  
  
  
NPI: [id number]  
  
  
[address] [place] [address]  
  
  
[phone] (Work)  
  
  
[phone] (Fax) PCP - General                   10/22/02          
   
                                                      
  
  
Júnior Ahuja MD  
  
  
NPI: [id number]  
  
  
[address] [place] [address]  
  
  
[phone] (Work)  
  
  
[phone] (Fax) Referring       Internal Medicine 22           
   
                                                      
  
  
Júnior Ahuja MD  
  
  
NPI: [id number]  
  
  
[address] [place] [address]  
  
  
[phone] (Work)  
  
  
[phone] (Fax) Home Care Provider Internal Medicine 22           
   
                                                      
  
  
Maurizio Cochran PA-C  
  
  
NPI: [id number]  
  
  
[address]  
  
  
[place] [address]  
  
  
[phone] (Work)  
  
  
[phone] (Fax)                 Gastroenterology 23           
   
                                                      
  
  
Nathalia Bernal PA-C  
  
  
NPI: [id number]  
  
  
[address]  
  
  
[place] [address]  
  
  
[phone] (Work)  
  
  
[phone] (Fax)                 General Surgery 23           
  
  
  
                      Team Member Relationship Specialty  Start Date End Date  
   
                                                      
  
  
Ahuja, Jackie, MD  
  
  
NPI: [id number]  
  
  
[address] [facility]  
  
  
[place] [address]  
  
  
[phone] (Work)  
  
  
[phone] (Fax) PCP - General                   10/22/02          
   
                                                      
  
  
Júnior Ahuja MD  
  
  
NPI: [id number]  
  
  
[address] [place] [address]  
  
  
[phone] (Work)  
  
  
[phone] (Fax) Referring       Internal Medicine 22           
   
                                                      
  
  
Júnior Ahuja MD  
  
  
NPI: [id number]  
  
  
[address] [place] [address]  
  
  
[phone] (Work)  
  
  
[phone] (Fax) Home Care Provider Internal Medicine 22           
   
                                                      
  
  
Maurizio Cochran PA-C  
  
  
NPI: [id number]  
  
  
[address] [place], [place] [id number] [phone] (Work)  
  
  
[phone] (Fax)                 Gastroenterology 23           
   
                                                      
  
  
Nathalia Bernal PA-C  
  
  
NPI: [id number]  
  
  
[address] [facility].  
  
  
[place] [address]  
  
  
[phone] (Work)  
  
  
[phone] (Fax)                 General Surgery 23           
  
  
  
                      Team Member Relationship Specialty  Start Date End Date  
   
                                                      
  
  
Júnior Ahuja MD  
  
  
NPI: [id number]  
  
  
[address] [place] [address]  
  
  
[phone] (Work)  
  
  
[phone] (Fax) PCP - General                   10/22/02          
   
                                                      
  
  
Júnior Ahuja MD  
  
  
NPI: [id number]  
  
  
[address] [facility]  
  
  
[place] [address]  
  
  
[phone] (Work)  
  
  
[phone] (Fax) Referring       Internal Medicine 22           
   
                                                      
  
  
Júnior Ahuja MD  
  
  
NPI: [id number]  
  
  
[address] [place] [address]  
  
  
[phone] (Work)  
  
  
[phone] (Fax) Home Care Provider Internal Medicine 22           
   
                                                      
  
  
Maurizio Cochran PA-C  
  
  
NPI: [id number]  
  
  
[address] [place] [address]  
  
  
[phone] (Work)  
  
  
[phone] (Fax)                 Gastroenterology 23           
   
                                                      
  
  
Nathalia Bernal PA-C  
  
  
NPI: [id number]  
  
  
[address]  
  
  
[place], [place] [address] [phone] (Work)  
  
  
[phone] (Fax)                 General Surgery 23           
  
  
  
                      Team Member Relationship Specialty  Start Date End Date  
   
                                                      
  
  
Júnior Ahuja MD  
  
  
NPI: [id number]  
  
  
[address] [place], [place] [address] [phone] (Work)  
  
  
[phone] (Fax) PCP - General                   10/22/02          
   
                                                      
  
  
Júnior Ahuja MD  
  
  
NPI: [id number]  
  
  
[address] [facility] [place] [address] [phone] (Work)  
  
  
[phone] (Fax) Referring       Internal Medicine 22           
   
                                                      
  
  
Júnior Ahuja MD  
  
  
NPI: [id number]  
  
  
[address] [place] [address]  
  
  
[phone] (Work)  
  
  
[phone] (Fax) Home Care Provider Internal Medicine 22           
   
                                                      
  
  
Maurizio Cochran PA-C  
  
  
NPI: [id number]  
  
  
[address]  
  
  
[place] [address]  
  
  
[phone] (Work)  
  
  
[phone] (Fax)                 Gastroenterology 23           
   
                                                      
  
  
Nathalia Bernal PA-C  
  
  
NPI: [id number]  
  
  
[address]  
  
  
[place] [address]  
  
  
[phone] (Work)  
  
  
[phone] (Fax)                 General Surgery 23           
  
  
  
                      Team Member Relationship Specialty  Start Date End Date  
   
                                                      
  
  
Júnior Ahuja MD  
  
  
NPI: [id number]  
  
  
[address] [place] [address]  
  
  
[phone] (Work)  
  
  
[phone] (Fax) PCP - General                   10/22/02          
   
                                                      
  
  
Júnior Ahuja MD  
  
  
NPI: [id number]  
  
  
[address] [place] [address]  
  
  
[phone] (Work)  
  
  
[phone] (Fax) Referring       Internal Medicine 22           
   
                                                      
  
  
Júnior Ahuja MD  
  
  
NPI: [id number]  
  
  
[address] [facility]  
  
  
[place] [address]  
  
  
[phone] (Work)  
  
  
[phone] (Fax) Home Care Provider Internal Medicine 22           
   
                                                      
  
  
Maurizio Cochran PA-C  
  
  
NPI: [id number]  
  
  
[address]  
  
  
[place] [address]  
  
  
[phone] (Work)  
  
  
[phone] (Fax)                 Gastroenterology 23           
   
                                                      
  
  
Nathalia Bernal PA-C  
  
  
NPI: [id number]  
  
  
[address]  
  
  
[place] [address]  
  
  
[phone] (Work)  
  
  
[phone] (Fax)                 General Surgery 23           
  
  
  
                      Team Member Relationship Specialty  Start Date End Date  
   
                                                      
  
  
Júnior Ahuja MD  
  
  
NPI: [id number]  
  
  
[address] [place] [address]  
  
  
[phone] (Work)  
  
  
[phone] (Fax) PCP - General                   10/22/02          
   
                                                      
  
  
Júnior Ahuja MD  
  
  
NPI: [id number]  
  
  
[address] [place] [address]  
  
  
[phone] (Work)  
  
  
[phone] (Fax) Referring       Internal Medicine 22           
   
                                                      
  
  
Júnior Ahuja MD  
  
  
NPI: [id number]  
  
  
[address] [place] [address]  
  
  
[phone] (Work)  
  
  
[phone] (Fax) Home Care Provider Internal Medicine 22           
   
                                                      
  
  
Maurizio Cochran PA-C  
  
  
NPI: [id number]  
  
  
[address]  
  
  
[place] [address]  
  
  
[phone] (Work)  
  
  
[phone] (Fax)                 Gastroenterology 23           
   
                                                      
  
  
Nathalia Bernal PA-C  
  
  
NPI: [id number]  
  
  
[address]  
  
  
[place] [address]  
  
  
[phone] (Work)  
  
  
[phone] (Fax)                 General Surgery 23           
  
  
  
                      Team Member Relationship Specialty  Start Date End Date  
   
                                                      
  
  
Júniro Ahuja MD  
  
  
NPI: [id number]  
  
  
[address] [place] [address]  
  
  
[phone] (Work)  
  
  
[phone] (Fax) PCP - General                   10/22/02          
   
                                                      
  
  
Júnior Ahuja MD  
  
  
NPI: [id number]  
  
  
[address] [place] [address]  
  
  
[phone] (Work)  
  
  
[phone] (Fax) Referring       Internal Medicine 22           
   
                                                      
  
  
Júnior Ahuja MD  
  
  
NPI: [id number]  
  
  
[address] [facility]  
  
  
[place] [address]  
  
  
[phone] (Work)  
  
  
[phone] (Fax) Home Care Provider Internal Medicine 22           
   
                                                      
  
  
Maurizio Cochran PA-C  
  
  
NPI: [id number]  
  
  
[address] [facility] [facility] [place] [address] [phone] (Work)  
  
  
[phone] (Fax)                 Gastroenterology 23           
   
                                                      
  
  
Nathalia Bernal PA-C  
  
  
NPI: [id number]  
  
  
[address] [place] [address]  
  
  
[phone] (Work)  
  
  
[phone] (Fax)                 General Surgery 23           
  
  
  
                      Team Member Relationship Specialty  Start Date End Date  
   
                                                      
  
  
Júnior Ahuja MD  
  
  
NPI: [id number]  
  
  
[address] [place] [address]  
  
  
[phone] (Work)  
  
  
[phone] (Fax) PCP - General                   10/22/02          
   
                                                      
  
  
Júnior Ahuja MD  
  
  
NPI: [id number]  
  
  
[address] [place] [address]  
  
  
[phone] (Work)  
  
  
[phone] (Fax) Referring       Internal Medicine 22           
   
                                                      
  
  
Júnior Ahuja MD  
  
  
NPI: [id number]  
  
  
[address] [place] [address]  
  
  
[phone] (Work)  
  
  
[phone] (Fax) Home Care Provider Internal Medicine 22           
   
                                                      
  
  
Maurizio Cochran PA-C  
  
  
NPI: [id number]  
  
  
[address]  
  
  
[place] [address]  
  
  
[phone] (Work)  
  
  
[phone] (Fax)                 Gastroenterology 23           
   
                                                      
  
  
Nathalia Bernal PA-C  
  
  
NPI: [id number]  
  
  
[address] [place] [address]  
  
  
[phone] (Work)  
  
  
[phone] (Fax)                 General Surgery 23           
  
  
  
                      Team Member Relationship Specialty  Start Date End Date  
   
                                                      
  
  
Júnior Ahuja MD  
  
  
NPI: [id number]  
  
  
[address] [place], [place] [address] [phone] (Work)  
  
  
[phone] (Fax) PCP - General                   10/22/02          
   
                                                      
  
  
Júnior Ahuja MD  
  
  
NPI: [id number]  
  
  
[address] [place] [address]  
  
  
[phone] (Work)  
  
  
[phone] (Fax) Referring       Internal Medicine 22           
   
                                                      
  
  
Júnior Ahuja MD  
  
  
NPI: [id number]  
  
  
[address] [place] [address]  
  
  
[phone] (Work)  
  
  
[phone] (Fax) Home Care Provider Internal Medicine 22           
   
                                                      
  
  
Brenda Hernandez,   
PT  
  
  
[address] [place] [address]  
  
  
[phone] (Work) Home Care  Acute Care 22  
  
  
  
                      Team Member Relationship Specialty  Start Date End Date  
   
                                                      
  
  
Júnior Ahuja MD  
  
  
NPI: [id number]  
  
  
[address] [place] [address]  
  
  
[phone] (Work)  
  
  
[phone] (Fax) PCP - General                   10/22/02          
   
                                                      
  
  
Júnior Ahuja MD  
  
  
NPI: [id number]  
  
  
[address] [place], [place] [address] [phone] (Work)  
  
  
[phone] (Fax) Referring       Internal Medicine 22           
   
                                                      
  
  
Júnior Ahuja MD  
  
  
NPI: [id number]  
  
  
[address] [place], [place] [address] [phone] (Work)  
  
  
[phone] (Fax) Home Care Provider Internal Medicine 22           
   
                                                      
  
  
Maurizio Cochran PA-C  
  
  
NPI: [id number]  
  
  
[address] [facility] [place] [address]  
  
  
[phone] (Work)  
  
  
[phone] (Fax)                 Gastroenterology 23           
   
                                                      
  
  
Nathalia Bernal PA-C  
  
  
NPI: [id number]  
  
  
[address]  
  
  
[place], [place] [address] [phone] (Work)  
  
  
[phone] (Fax)                 General Surgery 23           
  
  
  
                      Team Member Relationship Specialty  Start Date End Date  
   
                                                      
  
  
Júnior Ahuja MD  
  
  
NPI: [id number]  
  
  
[address] [place], [place] [address] [phone] (Work)  
  
  
[phone] (Fax) PCP - General                   10/22/02          
   
                                                      
  
  
Júnior Ahuja MD  
  
  
NPI: [id number]  
  
  
[address] [place], [place] [address] [phone] (Work)  
  
  
[phone] (Fax) Referring       Internal Medicine 22           
   
                                                      
  
  
Júnior Ahuja MD  
  
  
NPI: [id number]  
  
  
[address] [place], [place] [address] [phone] (Work)  
  
  
[phone] (Fax) Home Care Provider Internal Medicine 22           
   
                                                      
  
  
Maurizio Cochran PA-C  
  
  
NPI: [id number]  
  
  
[address] [place] [address]  
  
  
[phone] (Work)  
  
  
[phone] (Fax)                 Gastroenterology 23           
   
                                                      
  
  
Nathalia Bernal PA-C  
  
  
NPI: [id number]  
  
  
[address]  
  
  
[place], [place] [address] [phone] (Work)  
  
  
[phone] (Fax)                 General Surgery 23           
  
  
FOR RECORDS PERTAINING TO PATIENTS WHO ARE OR HAVE BEEN ENROLLED IN A CHEMICAL 
DEPENDENCY/SUBSTANCEABUSE PROGRAM, SOME INFORMATION MAY BE OMITTED. This 
clinical summary was aggregated from multiple sources. Caution should be 
exercised in using it in the provision of clinical care. This summary normalizes
 information from multiple sources, and as a consequence, information in this 
document may materially change the coding, format and clinical context of 
patient data. In addition, data may be omitted in some cases. CLINICAL DECISIONS
 SHOULD BE BASED ON THE PRIMARY CLINICAL RECORDS. [employer] [facility]. provides
 no warranty or guarantee of the accuracy or completeness of information in this
 document.

## 2023-12-22 NOTE — CASEMGMT
RN CM Face to Face with patient for initial transition planning/care coordination assessment. RN CM introduced self and role at [facility]. Patient lying in bed, alert and oriented. Patient willing to participate in assessment and is able to answer all 
questions appropriately.  Care providers, pharmacy, and demographics verified. Patient wishes to discharge home, will monitor for SNF pending progress with therapy.  Patient states he has no further needs or concerns at this time. CM to follow for 
discharge planning needs that may arise.

PCP: [facility] 
Specialists: Silvana, cardiologist; Veda pulmonologist 
Preferred Pharmacy: [facility] [place] 
Insurance: [facility], [employer] 
Prescription Benefit: yes 
Living Will/HPOA: yes, daughter Dixie Godinez 
LNOK: daughter 
Living Arrangements:Patient lives alone in a single story home with 4 steps or stair lift to enter.  Patient states he was independent at home. 
Transportation: aides 
DME/HHC: Patient has shower chair, raised toilet, cane, walker, rollator, grab bars, bipap, nebulizer, and pulse ox at home.  Patient has had [facility] HHC in the past.  [facility] [place] [facility].  [facility] 
9a-2p.  

Disposition Plan: TBD, anticipate HHC vs SNF pending progress with therapy. 

Monique REARDON, RN, CM

## 2023-12-22 NOTE — PN.HOSP_ITS
Subjective    
Subjective    
Doing well, feels better since he came into the hospital.    
    
Objective Data    
Objective Data    
Vital Signs:     
Vital Signs    
    
    
    
Temp Pulse Resp BP Pulse Ox O2 Del Method O2 Flow Rate    
     
 98.7 F   76   16   112/76   95   Nasal Cannula   3     
     
 12/22/23 12:00  12/22/23 12:00  12/22/23 12:00  12/22/23 12:00  12/22/23 12:00   
12/22/23 12:00  12/22/23 12:00    
    
    
    
    
Oxygen Flow Rate (L/min)         3                                                
       
Oxygen Delivery Method           Nasal Cannula                                    
       
Weight:                          266 lb 12.149 oz                                 
       
Body Mass Index (BMI)            38.2                                             
       
    
    
Intake & Output:     
                       Intake and Output for Last 24 Hours    
    
    
    
 12/21/23 12/22/23 12/23/23    
    
 03:59 03:59 03:59    
     
Intake Total  2050.58 / 2050.58 510 / 510    
     
Balance  2050.58 / 2050.58 510 / 510    
    
    
    
Lab / Micro Data    
    
                                                        12/22/23 03:30              
    
                                                        12/22/23 03:30              
    
Labs:     
                         Laboratory Results - last 24 hr    
    
12/21/23 21:56: WBC 11.4 H, RBC 4.26 L, Hgb 14.3, Hct 43.3, .6 H, MCH   
33.6 H, MCHC 33.0, RDW Std Deviation 56.5 H, RDW Coeff of Ilia 15.1 H, Plt Count   
131 L, MPV 10.7, Immature Gran % (Auto) 0.300, Neut % (Auto) 93.2 H, Lymph %   
(Auto) 1.8 L, Mono % (Auto) 4.1, Eos % (Auto) 0.2, Baso % (Auto) 0.4, Absolute   
Neuts (auto) 10.6 H, Absolute Lymphs (auto) 0.21 L, Nucleated RBC % 0,   
Differential Comment , D-Dimer Quant (PE/DVT) 3.03 H*, Sodium 142, Potassium   
4.4, Chloride 110 H, Carbon Dioxide 27.0, Anion Gap 5, BUN 22 H, Creatinine   
0.88, Estim Creat Clear Calc 65.67, Est GFR (MDRD) Af Amer 106, Est GFR (MDRD)   
Non-Af 87, BUN/Creatinine Ratio 24.9 H, Glucose 96, Calcium 11.2 H, Troponin I   
High Sens 24, B-Natriuretic Peptide 270.4 H    
12/22/23 00:43: Total Bilirubin 1.60 H, Direct Bilirubin 0.62 H, AST 37, ALT 28,  
Alkaline Phosphatase 161 H, Troponin I High Sens 33, Total Protein 4.8 L,   
Albumin 2.0 L, Globulin 2.8, Lipase 16    
12/22/23 03:30: WBC 8.9, RBC 3.42 L, Hgb 11.2 L, Hct 35.5 L, .8 H, MCH   
32.7 H, MCHC 31.5 L, RDW Std Deviation 57.5 H, RDW Coeff of Ilia 15.3 H, Plt   
Count 78 L, MPV 10.8, Immature Gran % (Auto) 0.300, Neut % (Auto) 91.5 H, Lymph   
% (Auto) 2.0 L, Mono % (Auto) 6.0, Eos % (Auto) 0.0, Baso % (Auto) 0.2, Absolute  
Neuts (auto) 8.1 H, Absolute Lymphs (auto) 0.18 L, Nucleated RBC % 0,   
Differential Comment SCANNED, Platelet Estimate MOD DEC, Sodium 144, Potassium   
4.6, Chloride 114 H, Carbon Dioxide 24.0, Anion Gap 6, BUN 24 H, Creatinine   
0.89, Estim Creat Clear Calc 64.93, Est GFR (MDRD) Af Amer 104, Est GFR (MDRD)   
Non-Af 86, BUN/Creatinine Ratio 26.9 H, Glucose 98, Calcium 9.4, Total Bilirubin  
1.50 H, AST 43 H, ALT 31, Alkaline Phosphatase 158 H, Total Protein 5.0 L,   
Albumin 2.2 L, Globulin 2.8, Albumin/Globulin Ratio 0.8 L, PTH Intact 104.1 H    
    
    
Radiography    
Diagnostic Testing:     
                              Radiology Impression    
    
Chest X-Ray  12/21/23 22:07    
IMPRESSION:    
     
Bilateral lower lobe airspace disease which may represent pneumonia.    
     
Electronically Signed:    
Amari King MD    
2023/12/21 at 22:36 EST    
Reading Location ID and State: [id number] / [place]    
Tel [phone], Service support  [phone], Fax [phone]    
     
    
    
Chest CTA  12/22/23 00:01    
IMPRESSION:    
     
1.  Negative for PE.    
     
2.  No thoracic aortic dissection.    
     
3.  Bilateral lower lobe pneumonia, left greater than right.    
     
4.  Small areas of groundglass opacity in the upper lobes and right middle    
lobe, which could be due to atelectasis or possibly Covid pneumonia.    
     
5.  Bilateral pleural effusions, right greater than left.    
     
6.  Hiatal hernia again noted.    
     
7.  Findings of hepatic cirrhosis again noted with splenomegaly and    
increasing ascites.    
     
8. Cholelithiasis again noted.    
     
Electronically Signed:    
Arnulfo Rodriguez MD    
2023/12/22 at 2:15 EST    
Reading Location ID and State: [id number] / [place]    
Tel [phone], Service support  [phone], Fax [phone]    
     
    
    
    
    
Physical Exam    
Narrative    
General: Alert, Oriented x3, Cooperative, No apparent distress    
HEENT: Atraumatic, PERRLA, EOMI, Normocephalic    
Oral: Moist Mucosa    
Neck: Supple, No JVD    
Lungs: Diminished, Normal air movement, No rhonchi, No wheeze, No rales    
Cardiovascular: Regular rate, Regular Rhythm, Normal S1, Normal S2, No murmurs    
Abdomen: Soft, Non Tender, Non-Distended, No Hepato-splenomegaly    
Extremities: No edema, Capillary Refill Less than 3 Seconds    
Skin: Chronic venous stasis changes with right lower extremity redness    
Musculoskeletal: No Tenderness to Palpation of Joints or Extremities    
Neurological: Cranial nerves II-XII grossly intact, Motor Exam 5/5 strength t  
hroughout, Sensory exam intact to light touch and pain    
Psych/Mental Status: Normal Affect, Appropriate    
    
Assessment & Plan    
Assessment/Plan    
(1) Paroxysmal A-fib:     
PLAN:     
Plan    
    
1.  Bibasilar community-acquired pneumonia with hypoxia with right lower   
extremity cellulitis    
? Continue with Rocephin and azithromycin    
? She did have an elevated D-dimer and CTA of the chest was negative for PE    
? Will consult PT/OT for evaluation and discharge planning    
    
2.  Paroxysmal A-fib with RVR/HTN/HLD/aortic stenosis status post   
replacement/history of CVA/history of retinal artery occlusion    
?Continue with his home blood pressure medications    
? Continue with his home blood pressure medications, will monitor make adju  
stments as necessary    
? Continue with his home cholesterol medications    
    
3.  Anemia of chronic disease in setting of CKD 3    
? Does have a history of polycystic kidney disease    
? We will monitor renal function currently at baseline    
    
4.  CÁRDENAS with a history of cirrhosis and previous liver cancer/GERD    
? He does have stable ascites, will continue with his home medications    
? Continue with his PPI    
    
5.  Gout    
? Stable    
? Continue with his home allopurinol    
    
6.  Anxiety/depression    
? Stable    
? Continue with home medications    
    
DVT: Lovenox    
    
Charges/Coding    
Visit Charges    
Inpatient E&M: 22880 Subs Hosp L2

## 2023-12-22 NOTE — PCM.CONS.C
Assessment & Plan
Assessment/Plan
(1) Paroxysmal A-fib: 
(2) History of aortic valve replacement with bioprosthetic valve: 
PLAN: 
Plan
 Pt is currently in SR,  Pt does have increase in falls.  he is hesitant to start anticoagulation.  For now will monitor.  
 Do not feel echo needs repeated at this time
 Will sign off, if you need any additional input please ask 

HPI
Consult Data
Date of Consult: 12/22/23
HPI Narrative
HPI Narrative: 
NENA MADRIGAL, is a 83 M who presented to [facility] ER for increased SOB.  He was admitted with pneumonia.  He does have a hx of PAF.  He is not currently anticoagulated. he was last seen on our office in 2/2022.  He does have a history significant for 
hypertension, polycystic kidney disease, hyperlipidemia, aortic valve stenosis status post aortic valve replacement with a #25 Liza Stack valve in 2011.  At that time he was noted to have minimal obstructive coronary disease.  His 
echocardiogram was in March 2019 demonstrated an ejection fraction of 70%, severe mitral calcification, a peak gradient across the aortic valve of 22 mmHg and a mean gradient of 11 mmHg.  He saw the ophthalmologist and was noted to have a retinal 
artery occlusion.  An echocardiogram was reordered done in October and demonstrated an ejection fraction of 65%, stage I diastolic dysfunction, mitral calcification, stable aortic valve with a mean gradient of 10 mmHg across.  Carotid ultrasound did 
not demonstrate any significant stenosis.  


Formerly [facility]
Medical History (Reviewed 12/22/23 @ 00:20 by Dr. Joseph Li, )

Anemia
Anemia in chronic kidney disease
Anemia of chronic disease
Aortic valve disorder
Asthma
Body mass index (BMI) 40.0-44.9, adult
Chronic acquired lymphedema
CKD (chronic kidney disease), stage III
Depression
Essential (primary) hypertension
GERD (gastroesophageal reflux disease)
Gout
History of cerebrovascular accident
History of splenomegaly
Hyperlipemia
Liver cancer
Morbid obesity
Multiple lung nodules
New onset atrial flutter
Non-alcoholic cirrhosis
Nonobstructive atherosclerosis of coronary artery
Nonrheumatic aortic (valve) stenosis
OAB (overactive bladder)
Obesity
Obesity (BMI 30-39.9)
CAREN treated with BiPAP
Osteoarthritis
PCK (polycystic kidney disease)
Peripheral neuropathy
Polycystic kidney disease
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
Right bundle branch block (RBBB)
Splenomegaly
Thrombocytopenia
Thrombocytopenia
Urge incontinence


Home Medications

allopurinol 300 mg tablet 300 mg PO DAILY gout 12/27/16 [History Last Taken 12/22/19]
fenofibrate nanocrystallized 48 mg tablet 48 mg PO DAILY cholesterol 12/27/16 [History Last Taken 12/22/19]
magnesium oxide 400 mg (241.3 mg magnesium) tablet 400 mg PO DAILY supplement 12/27/16 [History Last Taken 12/22/19]
multivit-min-folic acid 0.4 mg-lycopene 300 mcg-lutein 250 mcg tablet 1 ea PO DAILY vitamin 12/27/16 [History Last Taken 12/22/19]
sertraline 100 mg tablet 50 mg PO QHS depression 05/21/20 [History Last Taken Unknown]
aspirin 81 mg tablet,delayed release (Adult Aspirin Regimen) 81 mg PO DAILY 07/27/21 [History Last Taken Unknown]
ferrous sulfate 325 mg (65 mg iron) tablet 325 mg PO BID 07/27/21 [History Last Taken Unknown]
albuterol sulfate 90 mcg/actuation aerosol inhaler 2 puff inhalation Q6H PRN ASTHMA 02/08/22 [History Last Taken Unknown]
fluticasone propionate 50 mcg/actuation nasal spray,suspension 1 spray intranasal DAILY 02/08/22 [History Last Taken Unknown]
omeprazole 40 mg capsule,delayed release 40 mg PO DAILY 02/08/22 [History Last Taken Unknown]
oxybutynin chloride 15 mg tablet,extended release 24 hr 30 mg PO DAILY 02/08/22 [History Last Taken Unknown]
ascorbic acid (vitamin C) 500 mg capsule 500 mg PO DAILY 01/31/23 [History Last Taken Unknown]
furosemide 20 mg tablet 20 mg PO Q OTHER DAY 01/31/23 [History Last Taken Unknown]
nitroglycerin 0.4 mg sublingual tablet 0.4 mg sublingual Q5M PRN chest pain 01/31/23 [History Last Taken Unknown]
lidocaine 5 % topical patch (Lidoderm) 1 patch topical DAILY #15 ea 05/28/23 [Rx Last Taken Unknown]
cyanocobalamin (vitamin B-12) 5,000 mcg capsule 2,500 mcg PO DAILY 07/15/23 [History Last Taken Unknown]
rifaximin 550 mg tablet (Xifaxan) 550 mg PO BID 07/15/23 [History Last Taken Unknown]
diltiazem HCl 120 mg capsule,extended release 24 hr 120 mg PO DAILY #30 caps 08/23/23 [Rx Last Taken Unknown]
metoprolol tartrate 25 mg tablet 25 mg PO BID #60 tabs 08/23/23 [Rx Last Taken Unknown]
fluticasone furoate 200 mcg-vilanterol 25 mcg/dose inhalation powder (Breo Ellipta) 1 inh inhalation DAILY 12/21/23 [History Last Taken Unknown]




Allergy/AdvReac Type Severity Reaction Status Date / Time
hydrocodone [From Vicodin] Allergy  Other Verified 12/21/23 21:38
strawberry Allergy  Hives Verified 12/21/23 21:38
venom-honey bee Allergy  Anaphylaxis Verified 12/21/23 21:38
[bee venom (honey bee)]     
adhesive tape AdvReac  Rash Verified 07/15/23 19:14
atorvastatin AdvReac  PT UNSURE Verified 12/21/23 21:38
   OF REACTION  
montelukast AdvReac  PT UNSURE Verified 12/21/23 21:38
   OF REACTION  


Family History (Reviewed 12/22/23 @ 00:20 by Dr. Joseph Li DO)
Father
 CAD (coronary artery disease)
Brother
 Diabetes
Mother
 Heart disease


Surgical History (Updated 12/22/23 @ 14:50 by Kiana LERNER, PA)

Aortic valve replaced
History of aortic valve replacement with bioprosthetic valve (10/04/11)
History of hip surgery (05/2020)
History of knee replacement
History of left heart catheterization (09/21/11)
History of tonsillectomy
History of total left hip arthroplasty
History of total left hip replacement
History of umbilical hernia repair


Social History (Reviewed 12/22/23 @ 00:20 by Dr. Joseph Li DO)
household members:  none 
Smoking Status:  Never smoker 
alcohol intake:  never 
substance use type:  does not use 



ROS
Constitutional
Constitutional: Reports body ache(s), fatigue and frequent falls
Cardiovascular
Cardiovascular: Denies chest pain, chest pain at rest, chest pain with activity or palpitations
Respiratory/Chest
Respiratory/Chest: Reports dyspnea and dyspnea on exertion
Gastrointestinal
Gastrointestinal: Reports none
Musculoskeletal
Musculoskeletal: Reports joint pain, muscle weakness and myalgias

Physical Exam
Const
alert and oriented x3
General Appearance: frail
HEENT
normocephalic, head/scalp atraumatic, hearing grossly normal bilaterally, external ears normal, external nose normal and moist oral mucous membranes
Eyes
PERRL, EOMs intact bilaterally, conjunctivae normal and no scleral icterus
Neck
no lymphadenopathy, supple and no JVD
Cardio
regular rate, regular rhythm, S1 normal heart sound, S2 normal heart sound, no rub, no gallops, no clicks, no JVD and peripheral pulses 2+ throughout
GI
normal to inspection, nondistended, normoactive bowel sounds, soft to palpation, non-tender and non-distended
Neuro
oriented x3, CN's II-XII intact bilaterally, moves all extremities and no focal motor deficits
Psych
cooperative and affect normal

Risk Stratification
Risk Stratification Applicable: No

Charges/Coding
Visit Charges
Office Visits / Consults: 93387 IP Consult L3

Objective Data
Vital Signs: 
Vital Signs

Temp Pulse Resp BP Pulse Ox O2 Del Method O2 Flow Rate
 98.7 F   76   16   112/76   95   Nasal Cannula   3 
 12/22/23 12:00  12/22/23 12:00  12/22/23 12:00  12/22/23 12:00  12/22/23 12:00  12/22/23 12:00  12/22/23 12:00



Oxygen Flow Rate (L/min)       3                                                
Oxygen Delivery Method         Nasal Cannula                                    
Weight:                        266 lb 12.149 oz                                 
Body Mass Index (BMI)          38.2                                             


Intake & Output: 
Intake and Output for Last 24 Hours

 12/20/23 12/21/23 12/22/23
 23:59 23:59 23:59
Intake Total  1000 / 1000 1560.58 / 1560.58
Balance  1000 / 1000 1560.58 / 1560.58


Lab / Micro Data

12/22/23 03:30          

12/22/23 03:30          

Labs: 
Laboratory Results - last 24 hr

12/21/23 21:56: WBC 11.4 H, RBC 4.26 L, Hgb 14.3, Hct 43.3, .6 H, MCH 33.6 H, MCHC 33.0, RDW Std Deviation 56.5 H, RDW Coeff of Ilia 15.1 H, Plt Count 131 L, MPV 10.7, Immature Gran % (Auto) 0.300, Neut % (Auto) 93.2 H, Lymph % (Auto) 1.8 L, 
Mono % (Auto) 4.1, Eos % (Auto) 0.2, Baso % (Auto) 0.4, Absolute Neuts (auto) 10.6 H, Absolute Lymphs (auto) 0.21 L, Nucleated RBC % 0, Differential Comment , D-Dimer Quant (PE/DVT) 3.03 H*, Sodium 142, Potassium 4.4, Chloride 110 H, Carbon Dioxide 
27.0, Anion Gap 5, BUN 22 H, Creatinine 0.88, Estim Creat Clear Calc 65.67, Est GFR (MDRD) Af Amer 106, Est GFR (MDRD) Non-Af 87, BUN/Creatinine Ratio 24.9 H, Glucose 96, Calcium 11.2 H, Troponin I High Sens 24, B-Natriuretic Peptide 270.4 H
12/22/23 00:43: Total Bilirubin 1.60 H, Direct Bilirubin 0.62 H, AST 37, ALT 28, Alkaline Phosphatase 161 H, Troponin I High Sens 33, Total Protein 4.8 L, Albumin 2.0 L, Globulin 2.8, Lipase 16
12/22/23 03:30: WBC 8.9, RBC 3.42 L, Hgb 11.2 L, Hct 35.5 L, .8 H, MCH 32.7 H, MCHC 31.5 L, RDW Std Deviation 57.5 H, RDW Coeff of Ilia 15.3 H, Plt Count 78 L, MPV 10.8, Immature Gran % (Auto) 0.300, Neut % (Auto) 91.5 H, Lymph % (Auto) 2.0 L, 
Mono % (Auto) 6.0, Eos % (Auto) 0.0, Baso % (Auto) 0.2, Absolute Neuts (auto) 8.1 H, Absolute Lymphs (auto) 0.18 L, Nucleated RBC % 0, Differential Comment SCANNED, Platelet Estimate MOD DEC, Sodium 144, Potassium 4.6, Chloride 114 H, Carbon Dioxide 
24.0, Anion Gap 6, BUN 24 H, Creatinine 0.89, Estim Creat Clear Calc 64.93, Est GFR (MDRD) Af Amer 104, Est GFR (MDRD) Non-Af 86, BUN/Creatinine Ratio 26.9 H, Glucose 98, Calcium 9.4, Total Bilirubin 1.50 H, AST 43 H, ALT 31, Alkaline Phosphatase 
158 H, Total Protein 5.0 L, Albumin 2.2 L, Globulin 2.8, Albumin/Globulin Ratio 0.8 L, PTH Intact 104.1 H


Cardiology Labs/Tests


12/21/23 21:56: WBC 11.4 H, RBC 4.26 L, Hgb 14.3, Hct 43.3, .6 H, MCH 33.6 H, MCHC 33.0, Plt Count 131 L, MPV 10.7, Immature Gran % (Auto) 0.300, Neut % (Auto) 93.2 H, Lymph % (Auto) 1.8 L, Mono % (Auto) 4.1, Eos % (Auto) 0.2, Baso % (Auto) 
0.4, Absolute Neuts (auto) 10.6 H, Nucleated RBC % 0, D-Dimer Quant (PE/DVT) 3.03 H*, Sodium 142, Potassium 4.4, Chloride 110 H, Carbon Dioxide 27.0, Anion Gap 5, BUN 22 H, Creatinine 0.88, Est GFR (MDRD) Af Amer 106, Est GFR (MDRD) Non-Af 87, 
BUN/Creatinine Ratio 24.9 H, Glucose 96, Calcium 11.2 H, B-Natriuretic Peptide 270.4 H
12/22/23 00:43: Total Bilirubin 1.60 H, Direct Bilirubin 0.62 H
12/22/23 03:30: WBC 8.9, RBC 3.42 L, Hgb 11.2 L, Hct 35.5 L, .8 H, MCH 32.7 H, MCHC 31.5 L, Plt Count 78 L, MPV 10.8, Immature Gran % (Auto) 0.300, Neut % (Auto) 91.5 H, Lymph % (Auto) 2.0 L, Mono % (Auto) 6.0, Eos % (Auto) 0.0, Baso % (Auto) 
0.2, Absolute Neuts (auto) 8.1 H, Nucleated RBC % 0, Sodium 144, Potassium 4.6, Chloride 114 H, Carbon Dioxide 24.0, Anion Gap 6, BUN 24 H, Creatinine 0.89, Est GFR (MDRD) Af Amer 104, Est GFR (MDRD) Non-Af 86, BUN/Creatinine Ratio 26.9 H, Glucose 
98, Calcium 9.4, Total Bilirubin 1.50 H

Rhythm:

Echocardiogram 7/2023:
Mild concentric left ventricular hypertrophy.
The left ventricular ejection fraction is 60 %.
Diastolic function is indeterminate.
Severely dilated right ventricle.
Severe global right ventricular systolic dysfunction.
The left atrium is severely enlarged.
Severe mitral annular calcification.
Mild tricuspid valve insufficiency.
The right atrium is severely enlarged.
Right ventricular systolic pressure estimated to be 44 mmHg.
Bioprosthetic aortic valve mean gradient 14 mmHg
The study was technically difficult.

Radiography
Diagnostic Testing: 
Radiology Impression

Chest X-Ray  12/21/23 22:07
IMPRESSION:
 
Bilateral lower lobe airspace disease which may represent pneumonia.
 
Electronically Signed:
Amari King MD
2023/12/21 at 22:36 EST
Reading Location ID and State: [address] / [place]
Tel [phone], Service support  [phone], Fax [phone]
 


Chest CTA  12/22/23 00:01
IMPRESSION:
 
1.  Negative for PE.
 
2.  No thoracic aortic dissection.
 
3.  Bilateral lower lobe pneumonia, left greater than right.
 
4.  Small areas of groundglass opacity in the upper lobes and right middle
lobe, which could be due to atelectasis or possibly Covid pneumonia.
 
5.  Bilateral pleural effusions, right greater than left.
 
6.  Hiatal hernia again noted.
 
7.  Findings of hepatic cirrhosis again noted with splenomegaly and
increasing ascites.
 
8. Cholelithiasis again noted.
 
Electronically Signed:
Arnulfo Rodriguez MD
2023/12/22 at 2:15 EST
Reading Location ID and State: [address] / [place]
Tel [phone], Service support  [phone], Fax [phone]

## 2023-12-22 NOTE — VDLE_ITS
Reason For Study: Elevated D-dimer: 3.03 
 
RIGHT                                                  LEFT 
GSV is normal.                                         GSV is normal. 
CFV is compressible, spontaneous, phasic,              CFV is compressible, spontaneous, phasic, 
competent and demonstrates normal                      competent, and demonstrates normal 
augmentation.                                          augmentation. 
FV is compressible, spontaneous, phasic,               FV is compressible, spontaneous, phasic, 
competent and demonstrates normal                      competent and demonstrates normal 
augmentation.                                          augmentation. 
POP V is compressible, spontaneous, phasic,            POP V is compressible, spontaneous, phasic, 
competent and demonstrates normal                      competent and demonstrates normal 
augmentation.                                          augmentation. 
T/P Trunk is compressible.                             T/P Trunk is compressible. 
PTV is compressible.                                   PTV is compressible. 
RT PerV is compressible.                               LT PerV is compressible. 
Procedure 
This is a venous duplex using B-mode, color 
flow and spectral Doppler. 
Exam performed portable in patient room. 
The study was technically difficult. 
A preliminary report was called and/or faxed 
to [facility]. 
 
ORDER #: [id number] VL/Venous Duplex US - David Extrem  
Interpretation Summary  
Deep veins of the bilateral lower extremities are patent and compressible segme
ntally. There is no  
evidence of bilateral lower extremity deep vein thrombosis. The bilateral great
 saphenous veins  
appear patent and compressible segmentally.  
 
  
 
  
______________________________________________________________________________  
Ordering Physician: Joseph Li  
Referring Physician: Júnior Ahuja M.D.  
Performed By: Monique Guo RVT  
Electronically signed by: Margarito Brunner MD on 12/26/2023 11:35 AM

## 2023-12-22 NOTE — CON.PCM.CA_ITS
<Statement entered by Myron Villaseñor MD - 12/22/23 15:52>    
Pt seen & evaluated w/FRANSICO. I personally interviewed & exam the pt. I was   
involved in all aspects of pt's orders, interpretation of results & treatment    
    
Assessment & Plan    
Assessment/Plan    
(1) Paroxysmal A-fib:     
(2) History of aortic valve replacement with bioprosthetic valve:     
PLAN:     
Plan    
* Pt is currently in SR,  Pt does have increase in falls.  he is hesitant to   
  start anticoagulation.  For now will monitor.      
* Do not feel echo needs repeated at this time    
* Will sign off, if you need any additional input please ask     
    
HPI    
Consult Data    
Date of Consult: 12/22/23    
HPI Narrative    
HPI Narrative:     
NENA MADRIGAL, is a 83 M who presented to [facility] ER for increased SOB.  He was   
admitted with pneumonia.  He does have a hx of PAF.  He is not currently   
anticoagulated. he was last seen on our office in 2/2022.  He does have a   
history significant for hypertension, polycystic kidney disease, hyperlipidemia,  
aortic valve stenosis status post aortic valve replacement with a #25 Liza  
Stack valve in 2011.  At that time he was noted to have minimal obstructive   
coronary disease.  His echocardiogram was in March 2019 demonstrated an ejection  
fraction of 70%, severe mitral calcification, a peak gradient across the aortic   
valve of 22 mmHg and a mean gradient of 11 mmHg.  He saw the ophthalmologist and  
was noted to have a retinal artery occlusion.  An echocardiogram was reordered   
done in October and demonstrated an ejection fraction of 65%, stage I diastolic   
dysfunction, mitral calcification, stable aortic valve with a mean gradient of   
10 mmHg across.  Carotid ultrasound did not demonstrate any significant   
stenosis.      
    
    
[facility]    
Medical History (Reviewed 12/22/23 @ 00:20 by Dr. Joseph Li, )    
    
Anemia    
Anemia in chronic kidney disease    
Anemia of chronic disease    
Aortic valve disorder    
Asthma    
Body mass index (BMI) 40.0-44.9, adult    
Chronic acquired lymphedema    
CKD (chronic kidney disease), stage III    
Depression    
Essential (primary) hypertension    
GERD (gastroesophageal reflux disease)    
Gout    
History of cerebrovascular accident    
History of splenomegaly    
Hyperlipemia    
Liver cancer    
Morbid obesity    
Multiple lung nodules    
New onset atrial flutter    
Non-alcoholic cirrhosis    
Nonobstructive atherosclerosis of coronary artery    
Nonrheumatic aortic (valve) stenosis    
OAB (overactive bladder)    
Obesity    
Obesity (BMI 30-39.9)    
CAREN treated with BiPAP    
Osteoarthritis    
PCK (polycystic kidney disease)    
Peripheral neuropathy    
Polycystic kidney disease    
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    
Right bundle branch block (RBBB)    
Splenomegaly    
Thrombocytopenia    
Thrombocytopenia    
Urge incontinence    
    
    
                                Home Medications    
    
allopurinol 300 mg tablet 300 mg PO DAILY gout 12/27/16 [History Last Taken   
12/22/19]    
fenofibrate nanocrystallized 48 mg tablet 48 mg PO DAILY cholesterol 12/27/16   
[History Last Taken 12/22/19]    
magnesium oxide 400 mg (241.3 mg magnesium) tablet 400 mg PO DAILY supplement   
12/27/16 [History Last Taken 12/22/19]    
multivit-min-folic acid 0.4 mg-lycopene 300 mcg-lutein 250 mcg tablet 1 ea PO   
DAILY vitamin 12/27/16 [History Last Taken 12/22/19]    
sertraline 100 mg tablet 50 mg PO QHS depression 05/21/20 [History Last Taken   
Unknown]    
aspirin 81 mg tablet,delayed release (Adult Aspirin Regimen) 81 mg PO DAILY 07 /27/21 [History Last Taken Unknown]    
ferrous sulfate 325 mg (65 mg iron) tablet 325 mg PO BID 07/27/21 [History Last   
Taken Unknown]    
albuterol sulfate 90 mcg/actuation aerosol inhaler 2 puff inhalation Q6H PRN   
ASTHMA 02/08/22 [History Last Taken Unknown]    
fluticasone propionate 50 mcg/actuation nasal spray,suspension 1 spray   
intranasal DAILY 02/08/22 [History Last Taken Unknown]    
omeprazole 40 mg capsule,delayed release 40 mg PO DAILY 02/08/22 [History Last   
Taken Unknown]    
oxybutynin chloride 15 mg tablet,extended release 24 hr 30 mg PO DAILY 02/08/22   
[History Last Taken Unknown]    
ascorbic acid (vitamin C) 500 mg capsule 500 mg PO DAILY 01/31/23 [History Last   
Taken Unknown]    
furosemide 20 mg tablet 20 mg PO Q OTHER DAY 01/31/23 [History Last Taken   
Unknown]    
nitroglycerin 0.4 mg sublingual tablet 0.4 mg sublingual Q5M PRN chest pain   
01/31/23 [History Last Taken Unknown]    
lidocaine 5 % topical patch (Lidoderm) 1 patch topical DAILY #15 ea 05/28/23 [Rx  
 Last Taken Unknown]    
cyanocobalamin (vitamin B-12) 5,000 mcg capsule 2,500 mcg PO DAILY 07/15/23   
[History Last Taken Unknown]    
rifaximin 550 mg tablet (Xifaxan) 550 mg PO BID 07/15/23 [History Last Taken   
Unknown]    
diltiazem HCl 120 mg capsule,extended release 24 hr 120 mg PO DAILY #30 caps   
08/23/23 [Rx Last Taken Unknown]    
metoprolol tartrate 25 mg tablet 25 mg PO BID #60 tabs 08/23/23 [Rx Last Taken   
Unknown]    
fluticasone furoate 200 mcg-vilanterol 25 mcg/dose inhalation powder (Breo   
Ellipta) 1 inh inhalation DAILY 12/21/23 [History Last Taken Unknown]    
    
    
                                            
    
    
    
Allergy/AdvReac Type Severity Reaction Status Date / Time    
     
hydrocodone [From Vicodin] Allergy  Other Verified 12/21/23 21:38    
     
strawberry Allergy  Hives Verified 12/21/23 21:38    
     
venom-honey bee Allergy  Anaphylaxis Verified 12/21/23 21:38    
    
[bee venom (honey bee)]         
     
adhesive tape AdvReac  Rash Verified 07/15/23 19:14    
     
atorvastatin AdvReac  PT UNSURE Verified 12/21/23 21:38    
    
   OF REACTION      
     
montelukast AdvReac  PT UNSURE Verified 12/21/23 21:38    
    
   OF REACTION      
    
    
    
Family History (Reviewed 12/22/23 @ 00:20 by Dr. Joseph Li DO)    
Father   CAD (coronary artery disease)    
Brother   Diabetes    
Mother   Heart disease    
    
    
Surgical History (Updated 12/22/23 @ 14:50 by Kiana LERNER, PA)    
    
Aortic valve replaced    
History of aortic valve replacement with bioprosthetic valve (10/04/11)    
History of hip surgery (05/2020)    
History of knee replacement    
History of left heart catheterization (09/21/11)    
History of tonsillectomy    
History of total left hip arthroplasty    
History of total left hip replacement    
History of umbilical hernia repair    
    
    
Social History (Reviewed 12/22/23 @ 00:20 by Dr. Joseph Li DO)    
household members:  none     
Smoking Status:  Never smoker     
alcohol intake:  never     
substance use type:  does not use     
    
    
    
ROS    
Constitutional    
Constitutional: Reports body ache(s), fatigue and frequent falls    
Cardiovascular    
Cardiovascular: Denies chest pain, chest pain at rest, chest pain with activity   
or palpitations    
Respiratory/Chest    
Respiratory/Chest: Reports dyspnea and dyspnea on exertion    
Gastrointestinal    
Gastrointestinal: Reports none    
Musculoskeletal    
Musculoskeletal: Reports joint pain, muscle weakness and myalgias    
    
Physical Exam    
Const    
alert and oriented x3    
General Appearance: frail    
HEENT    
normocephalic, head/scalp atraumatic, hearing grossly normal bilaterally,   
external ears normal, external nose normal and moist oral mucous membranes    
Eyes    
PERRL, EOMs intact bilaterally, conjunctivae normal and no scleral icterus    
Neck    
no lymphadenopathy, supple and no JVD    
Cardio    
regular rate, regular rhythm, S1 normal heart sound, S2 normal heart sound, no   
rub, no gallops, no clicks, no JVD and peripheral pulses 2+ throughout    
GI    
normal to inspection, nondistended, normoactive bowel sounds, soft to palpation,  
non-tender and non-distended    
Neuro    
oriented x3, CN's II-XII intact bilaterally, moves all extremities and no focal   
motor deficits    
Psych    
cooperative and affect normal    
    
Risk Stratification    
Risk Stratification Applicable: No    
    
Charges/Coding    
Visit Charges    
Office Visits / Consults: 31816 IP Consult L3    
    
Objective Data    
Vital Signs:     
Vital Signs    
    
    
    
Temp Pulse Resp BP Pulse Ox O2 Del Method O2 Flow Rate    
     
 98.7 F   76   16   112/76   95   Nasal Cannula   3     
     
 12/22/23 12:00  12/22/23 12:00  12/22/23 12:00  12/22/23 12:00  12/22/23 12:00   
12/22/23 12:00  12/22/23 12:00    
    
    
    
    
Oxygen Flow Rate (L/min)         3                                                
       
Oxygen Delivery Method           Nasal Cannula                                    
       
Weight:                          266 lb 12.149 oz                                 
       
Body Mass Index (BMI)            38.2                                             
       
    
    
Intake & Output:     
                       Intake and Output for Last 24 Hours    
    
    
    
 12/20/23 12/21/23 12/22/23    
    
 23:59 23:59 23:59    
     
Intake Total  1000 / 1000 1560.58 / 1560.58    
     
Balance  1000 / 1000 1560.58 / 1560.58    
    
    
    
Lab / Micro Data    
    
                                                        12/22/23 03:30              
    
                                                        12/22/23 03:30              
    
Labs:     
                         Laboratory Results - last 24 hr    
    
12/21/23 21:56: WBC 11.4 H, RBC 4.26 L, Hgb 14.3, Hct 43.3, .6 H, MCH   
33.6 H, MCHC 33.0, RDW Std Deviation 56.5 H, RDW Coeff of Ilia 15.1 H, Plt Count   
131 L, MPV 10.7, Immature Gran % (Auto) 0.300, Neut % (Auto) 93.2 H, Lymph %   
(Auto) 1.8 L, Mono % (Auto) 4.1, Eos % (Auto) 0.2, Baso % (Auto) 0.4, Absolute   
Neuts (auto) 10.6 H, Absolute Lymphs (auto) 0.21 L, Nucleated RBC % 0,   
Differential Comment , D-Dimer Quant (PE/DVT) 3.03 H*, Sodium 142, Potassium   
4.4, Chloride 110 H, Carbon Dioxide 27.0, Anion Gap 5, BUN 22 H, Creatinine   
0.88, Estim Creat Clear Calc 65.67, Est GFR (MDRD) Af Amer 106, Est GFR (MDRD)   
Non-Af 87, BUN/Creatinine Ratio 24.9 H, Glucose 96, Calcium 11.2 H, Troponin I   
High Sens 24, B-Natriuretic Peptide 270.4 H    
12/22/23 00:43: Total Bilirubin 1.60 H, Direct Bilirubin 0.62 H, AST 37, ALT 28,  
Alkaline Phosphatase 161 H, Troponin I High Sens 33, Total Protein 4.8 L,   
Albumin 2.0 L, Globulin 2.8, Lipase 16    
12/22/23 03:30: WBC 8.9, RBC 3.42 L, Hgb 11.2 L, Hct 35.5 L, .8 H, MCH   
32.7 H, MCHC 31.5 L, RDW Std Deviation 57.5 H, RDW Coeff of Ilia 15.3 H, Plt   
Count 78 L, MPV 10.8, Immature Gran % (Auto) 0.300, Neut % (Auto) 91.5 H, Lymph   
% (Auto) 2.0 L, Mono % (Auto) 6.0, Eos % (Auto) 0.0, Baso % (Auto) 0.2, Absolute  
Neuts (auto) 8.1 H, Absolute Lymphs (auto) 0.18 L, Nucleated RBC % 0,   
Differential Comment SCANNED, Platelet Estimate MOD DEC, Sodium 144, Potassium   
4.6, Chloride 114 H, Carbon Dioxide 24.0, Anion Gap 6, BUN 24 H, Creatinine   
0.89, Estim Creat Clear Calc 64.93, Est GFR (MDRD) Af Amer 104, Est GFR (MDRD)   
Non-Af 86, BUN/Creatinine Ratio 26.9 H, Glucose 98, Calcium 9.4, Total Bilirubin  
1.50 H, AST 43 H, ALT 31, Alkaline Phosphatase 158 H, Total Protein 5.0 L,   
Albumin 2.2 L, Globulin 2.8, Albumin/Globulin Ratio 0.8 L, PTH Intact 104.1 H    
    
    
Cardiology Labs/Tests    
    
    
12/21/23 21:56: WBC 11.4 H, RBC 4.26 L, Hgb 14.3, Hct 43.3, .6 H, MCH   
33.6 H, MCHC 33.0, Plt Count 131 L, MPV 10.7, Immature Gran % (Auto) 0.300, Neut  
% (Auto) 93.2 H, Lymph % (Auto) 1.8 L, Mono % (Auto) 4.1, Eos % (Auto) 0.2, Baso  
% (Auto) 0.4, Absolute Neuts (auto) 10.6 H, Nucleated RBC % 0, D-Dimer Quant   
(PE/DVT) 3.03 H*, Sodium 142, Potassium 4.4, Chloride 110 H, Carbon Dioxide   
27.0, Anion Gap 5, BUN 22 H, Creatinine 0.88, Est GFR (MDRD) Af Amer 106, Est   
GFR (MDRD) Non-Af 87, BUN/Creatinine Ratio 24.9 H, Glucose 96, Calcium 11.2 H,   
B-Natriuretic Peptide 270.4 H    
12/22/23 00:43: Total Bilirubin 1.60 H, Direct Bilirubin 0.62 H    
12/22/23 03:30: WBC 8.9, RBC 3.42 L, Hgb 11.2 L, Hct 35.5 L, .8 H, MCH   
32.7 H, MCHC 31.5 L, Plt Count 78 L, MPV 10.8, Immature Gran % (Auto) 0.300, Derian  
t % (Auto) 91.5 H, Lymph % (Auto) 2.0 L, Mono % (Auto) 6.0, Eos % (Auto) 0.0,   
Baso % (Auto) 0.2, Absolute Neuts (auto) 8.1 H, Nucleated RBC % 0, Sodium 144,   
Potassium 4.6, Chloride 114 H, Carbon Dioxide 24.0, Anion Gap 6, BUN 24 H,   
Creatinine 0.89, Est GFR (MDRD) Af Amer 104, Est GFR (MDRD) Non-Af 86,   
BUN/Creatinine Ratio 26.9 H, Glucose 98, Calcium 9.4, Total Bilirubin 1.50 H    
    
Rhythm:    
    
Echocardiogram 7/2023:    
Mild concentric left ventricular hypertrophy.    
The left ventricular ejection fraction is 60 %.    
Diastolic function is indeterminate.    
Severely dilated right ventricle.    
Severe global right ventricular systolic dysfunction.    
The left atrium is severely enlarged.    
Severe mitral annular calcification.    
Mild tricuspid valve insufficiency.    
The right atrium is severely enlarged.    
Right ventricular systolic pressure estimated to be 44 mmHg.    
Bioprosthetic aortic valve mean gradient 14 mmHg    
The study was technically difficult.    
    
Radiography    
Diagnostic Testing:     
                              Radiology Impression    
    
Chest X-Ray  12/21/23 22:07    
IMPRESSION:    
     
Bilateral lower lobe airspace disease which may represent pneumonia.    
     
Electronically Signed:    
Amari King MD    
2023/12/21 at 22:36 EST    
Reading Location ID and State: [id number] / [place]    
Tel [phone], Service support  [phone], Fax [phone]    
     
    
    
Chest CTA  12/22/23 00:01    
IMPRESSION:    
     
1.  Negative for PE.    
     
2.  No thoracic aortic dissection.    
     
3.  Bilateral lower lobe pneumonia, left greater than right.    
     
4.  Small areas of groundglass opacity in the upper lobes and right middle    
lobe, which could be due to atelectasis or possibly Covid pneumonia.    
     
5.  Bilateral pleural effusions, right greater than left.    
     
6.  Hiatal hernia again noted.    
     
7.  Findings of hepatic cirrhosis again noted with splenomegaly and    
increasing ascites.    
     
8. Cholelithiasis again noted.    
     
Electronically Signed:    
Arnulfo Rodriguez MD    
2023/12/22 at 2:15 EST    
Reading Location ID and State: [id number] / [place]    
Tel [phone], Service support  [phone], Fax [phone]

## 2023-12-22 NOTE — XMS RPT_ITS
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                Chronic  
   
                                                    Coronary   
atherosclerosis and   
other heart disease  
(20 sources)                            Atherosclerotic heart   
disease of native   
coronary artery   
without angina   
pectoris;   
Translations:   
[Coronary   
atherosclerosis]                        Onset:   
10-  
1                         2021                Chronic  
   
                                                    Deficiency and other   
anemia  
(20 sources)                            Anemia of chronic   
disease; Translations:   
[Anemia in other   
chronic diseases   
classified elsewhere]                   Onset:   
  
7                         2019                Chronic  
   
                                                    Deficiency and other   
anemia  
(20 sources)                            Iron deficiency anemia   
due to blood loss;   
Translations: [Iron   
deficiency anemia   
secondary to blood   
loss (chronic)]                         Onset:   
  
3                         2023                Chronic  
   
                                                    Deficiency and other   
anemia  
(1 source)                              Iron deficiency anemia   
secondary to blood   
loss (chronic);   
Translations: [Iron   
deficiency anemia due   
to chronic blood loss]                  Onset:   
  
3                                                   Chronic  
   
                                                    Deficiency and other   
anemia  
(3 sources)                             Macrocytic anemia;   
Translations:   
[Nutritional anemia,   
unspecified]                                                Episodic  
   
                                                    Deficiency and other   
anemia  
(1 source)                              Iron deficiency   
anemia; Translations:   
[Iron deficiency   
anemia, unspecified]                                         Episodic  
   
                                                    Disorders of lipid   
metabolism  
(20 sources)                            Mixed hyperlipidemia;   
Translations: [Mixed   
hyperlipidemia]                         Onset:   
10-  
5                         2016                Chronic  
   
                                                    Esophageal disorders  
(20 sources)                            Gastroesophageal   
reflux disease;   
Translations:   
[Gastro-esophageal   
reflux disease without   
esophagitis]                            Onset:   
10-  
1                         2017                Chronic  
   
                                                    Essential hypertension  
(20 sources)                            Essential (primary)   
hypertension;   
Translations:   
[Essential   
hypertension]                           Onset:   
11-  
7                                                   Chronic  
   
                                                    Gastrointestinal   
hemorrhage  
(1 source)                              Melena; Translations:   
[Melena]                                10-          Episodic  
   
                                                    Genitourinary   
congenital anomalies  
(20 sources)                            Multiple renal cysts;   
Translations:   
[Polycystic kidney,   
unspecified]                            Onset:   
  
0                         2010                Chronic  
   
                                                    Genitourinary symptoms   
and ill-defined   
conditions  
(20 sources)                            Urge incontinence of   
urine; Translations:   
[Urge incontinence]                     Onset:   
  
9                         2019                Chronic  
   
                                                    Gout and other crystal   
arthropathies  
(20 sources)                            Gout; Translations:   
[Gout, unspecified]                     2017          Chronic  
   
                                                    Heart valve disorders  
(20 sources)                            Presence of prosthetic   
heart valve;   
Translations: [Aortic   
valve disorder]                         Onset:   
  
7                         2017                Chronic  
   
                                                    Neoplasms of   
unspecified nature or   
uncertain behavior  
(9 sources)                             Monoclonal gammopathy   
(clinical);   
Translations:   
[Monoclonal   
gammopathy]                             Onset:   
  
2                                                   Chronic  
   
                                                    Other and unspecified   
benign neoplasm  
(1 source)                              History of polyp of   
colon; Translations:   
[Personal history of   
colonic polyps]                                             Episodic  
   
                                                    Other connective   
tissue disease  
(1 source)                              Muscle weakness;   
Translations: [Muscle   
weakness   
(generalized)]                                              Episodic  
   
                                                    Other diseases of   
veins and lymphatics  
(1 source)                              Stasis dermatitis;   
Translations: [Venous   
insufficiency   
(chronic)   
(peripheral)]                                               Episodic  
   
                                                    Other diseases of   
veins and lymphatics  
(1 source)                              Skin ulcer of calf ;   
Translations: [Venous   
insufficiency   
(chronic)   
(peripheral)]                                               Episodic  
   
                                                    Other diseases of   
veins and lymphatics  
(1 source)                              Ulcer of lower   
extremity;   
Translations: [Venous   
insufficiency   
(chronic)   
(peripheral)]                                               Episodic  
   
                                                    Other endocrine   
disorders  
(2 sources)                             Hyperparathyroidism;   
Translations:   
[Hyperparathyroidism,   
unspecified]                                                Chronic  
   
                                                    Other gastrointestinal   
disorders  
(20 sources)                            Malabsorption - iron;   
Translations:   
[Intestinal   
malabsorption,   
unspecified]                            Onset:   
  
3                         2023                Chronic  
   
                                                    Other gastrointestinal   
disorders  
(1 source)                              Intestinal   
malabsorption,   
unspecified;   
Translations: [Iron   
malabsorption]                          Onset:   
  
3                                                   Chronic  
   
                                                    Other gastrointestinal   
disorders  
(1 source)                              H/O: liver disease;   
Translations:   
[Personal history of   
other diseases of the   
digestive system]                                           Episodic  
   
                                                    Other injuries and   
conditions due to   
external causes  
(1 source)                              History of fall;   
Translations: [History   
of falling]                                                 Episodic  
   
                                                    Other liver diseases  
(8 sources)                             Cirrhosis of liver;   
Translations:   
[Unspecified cirrhosis   
of liver]                                                   Chronic  
   
                                                    Other liver diseases  
(20 sources)                            Lesion of liver;   
Translations: [Liver   
disease, unspecified]                   Onset:   
  
3                                                   Chronic  
   
                                                    Other liver diseases  
(1 source)                              Disease of liver;   
Translations: [Liver   
disease, unspecified]                     10-          Chronic  
   
                                                    Other liver diseases  
(2 sources)                             Liver disease,   
unspecified;   
Translations: [Liver   
disease]                                Onset:   
  
3                                                   Chronic  
   
                                                    Other liver diseases  
(1 source)                              Unspecified cirrhosis   
of liver;   
Translations:   
[Cirrhosis of liver   
without ascites,   
unspecified hepatic   
cirrhosis type (HCC)]                   Onset:   
  
3                                                   Chronic  
   
                                                    Other liver diseases  
(3 sources)                             Liver mass;   
Translations:   
[Hepatomegaly, not   
elsewhere classified]                                         Episodic  
   
                                                    Other nervous system   
disorders  
(20 sources)                            Polyneuropathy;   
Translations:   
[Polyneuropathy,   
unspecified]                            Onset:   
  
5                         2017                Chronic  
   
                                                    Other nervous system   
disorders  
(1 source)                              Polyneuropathy,   
unspecified;   
Translations:   
[Peripheral   
polyneuropathy]                         Onset:   
  
7                                                   Chronic  
   
                                                    Other nutritional;   
endocrine; and   
metabolic disorders  
(20 sources)                            Obese class II;   
Translations:   
[Obesity, unspecified]                  Onset:   
  
1                         2021                Chronic  
   
                                                    Other nutritional;   
endocrine; and   
metabolic disorders  
(1 source)                              Metabolic syndrome X;   
Translations:   
[Metabolic syndrome]                                         Chronic  
   
                                                    Other nutritional;   
endocrine; and   
metabolic disorders  
(1 source)                              Hypercalcemia;   
Translations:   
[Hypercalcemia]                                             Chronic  
   
                                                    Other nutritional;   
endocrine; and   
metabolic disorders  
(13 sources)                            Obese class I;   
Translations:   
[Obesity, unspecified]                  Onset:   
  
3                         2023                Chronic  
   
                                                    Other nutritional;   
endocrine; and   
metabolic disorders  
(1 source)                              Obesity, unspecified;   
Translations:   
[Obesity, Class I, BMI   
30-34.9]                                Onset:   
  
3                                                   Chronic  
   
                                                    Other nutritional;   
endocrine; and   
metabolic disorders  
(1 source)                              Hypercalcemia;   
Translations:   
[Hypercalcemia]                         Onset:   
  
3                                                   Chronic  
   
                                                    Other skin disorders  
(1 source)                              Eruption;   
Translations: [Rash   
and other nonspecific   
skin eruption]                                              Episodic  
   
                                                    Paralysis  
(2 sources)                             Hemiplegia,   
unspecified affecting   
left nondominant side;   
Translations:   
[Hemiplegia,   
unspecified affecting   
left nondominant side   
(HCC)]                                  Onset:   
01-  
3                                                   Chronic  
   
                                                    Residual codes;   
unclassified  
(20 sources)                            Obstructive sleep   
apnea syndrome;   
Translations:   
[Obstructive sleep   
apnea (adult)   
(pediatric)]                            Onset:   
10-  
5                         2011                Chronic  
   
                                                    Residual codes;   
unclassified  
(1 source)                              Edema of lower   
extremity;   
Translations:   
[Localized edema]                                           Episodic  
   
                                                    Skin and subcutaneous   
tissue infections  
(2 sources)                             Site-specific   
infective disorders of   
skin; Translations:   
[Local infection of   
the skin and   
subcutaneous tissue,   
unspecified]                                                Episodic  
   
                                                    Superficial injury;   
contusion  
(1 source)                              Contusion of hip;   
Translations:   
[Contusion of   
unspecified hip,   
subsequent encounter]                                         Episodic  
   
                                                    Unclassified  
(1 source)                Unknown / UNK(Unknown)    Onset:   
11-  
7                                                     
   
                                                    Unclassified  
(1 source)                              Radiotherapy   
On-treatment Visit                      Onset:   
  
3                                                     
  
  
Past or Other Problems  
  
  
                      Problem Classification Problem    Date       Documented Da  
te Episodic/Chronic  
   
                                                    Deficiency and other   
anemia  
(1 source)                              Nutritional anemia,   
unspecified;   
Translations:   
[Macrocytic anemia]                     Onset:   
2022                                          Episodic  
   
                                                    Diabetes mellitus   
without complication  
(20 sources)                            Impaired fasting   
glycemia;   
Translations:   
[Impaired fasting   
glucose]                                Onset:   
2006                Episodic  
   
                                                    Other and unspecified   
benign neoplasm  
(20 sources)                            Adenomatous polyp   
of colon ;   
Translations:   
[Benign neoplasm of   
colon, unspecified]                     Onset:   
2011                Episodic  
   
                                                    Other and unspecified   
benign neoplasm  
(1 source)                              Personal history of   
colonic polyps;   
Translations:   
[History of colonic   
polyps]                                 Onset:   
2023                                          Episodic  
   
                                                    Other connective tissue   
disease  
(20 sources)                            Recurrent falls ;   
Translations:   
[Repeated falls]                        Onset:   
10-                                          Episodic  
   
                                                    Other connective tissue   
disease  
(1 source)                              Repeated falls;   
Translations:   
[Frequent falls]                        Onset:   
10-                                          Episodic  
   
                                                    Other gastrointestinal   
disorders  
(20 sources)                            Splenomegaly;   
Translations:   
[Splenomegaly, not   
elsewhere   
classified]                             Onset:   
09-                02-                Episodic  
   
                                                    Other gastrointestinal   
disorders  
(1 source)                              Splenomegaly, not   
elsewhere   
classified;   
Translations:   
[Splenomegaly]                          Onset:   
2015                                          Episodic  
   
                                                    Other liver diseases  
(1 source)                              Hepatomegaly, not   
elsewhere   
classified;   
Translations:   
[Liver mass]                            Onset:   
2023                                          Episodic  
   
                                                    Other nervous system   
disorders  
(20 sources)                            Abnormal gait;   
Translations:   
[Unspecified   
abnormalities of   
gait and mobility]                      Onset:   
2016                Episodic  
   
                                                    Other nervous system   
disorders  
(1 source)                              Unspecified   
abnormalities of   
gait and mobility;   
Translations:   
[Abnormality of   
gait]                                   Onset:   
2016                                          Episodic  
   
                                                    Residual codes;   
unclassified  
(1 source)                              Other specified   
personal risk   
factors, not   
elsewhere   
classified;   
Translations: [At   
risk for   
polypharmacy]                           Onset:   
2023                                          Episodic  
   
                                                    Varicose veins of lower   
extremity  
(20 sources)                            Skin ulcer;   
Translations:   
[Varicose veins of   
unspecified lower   
extremity with   
ulcer of   
unspecified site]                       Onset:   
2023                Episodic  
  
  
  
Results  
  
  
                      Test Name  Value      Interpretation Reference Range Facil  
ity  
  
  
  
                                          
   
                                          
   
                                          
   
                                          
   
                                          
   
                                          
   
                                          
   
                                          
   
                                          
   
                                          
   
                                          
   
                                          
   
                                          
   
                                          
   
                                          
   
                                          
   
                                          
   
                                          
   
                                          
   
                                          
   
                                          
   
                                          
   
                                          
   
                                          
   
                                          
   
                                          
   
                                          
   
                                          
   
                                          
   
                                          
   
                                          
   
                                          
   
                                          
   
                                          
   
                                          
   
                                          
   
                                          
   
                                          
   
                                          
   
                                          
   
                                          
   
                                          
   
                                          
   
                                          
   
                                          
   
                                          
   
                                          
   
                                          
   
                                          
   
                                          
   
                                          
   
                                          
   
                                          
   
                                          
   
                                          
   
                                          
   
                                          
   
                                          
   
                                          
   
                                          
   
                                          
   
                                          
   
                                          
   
                                          
   
                                          
   
                                          
   
                                          
   
                                          
   
                                          
   
                                          
   
                                          
   
                                          
   
                                          
   
                                          
   
                                          
   
                                          
   
                                          
   
                                          
   
                                          
   
                                          
   
                                          
   
                                          
   
                                          
   
                                          
   
                                          
   
                                          
   
                                          
   
                                          
   
                                          
   
                                          
   
                                          
   
                                          
   
                                          
   
                                          
   
                                          
   
                                          
   
                                          
   
                                          
   
                                          
   
                                          
   
                                          
   
                                          
   
                                          
   
                                          
   
                                          
   
                                          
   
                                          
   
                                          
   
                                          
   
                                          
   
                                          
   
                                          
   
                                          
   
                                          
   
                                          
   
                                          
   
                                          
   
                                          
   
                                          
   
                                          
   
                                          
   
                                          
   
                                          
   
                                          
   
                                          
   
                                          
   
                                          
   
                                          
   
                                          
   
                                          
   
                                          
   
                                          
   
                                          
   
                                          
   
                                          
   
                                          
   
                                          
   
                                          
   
                                          
   
                                          
   
                                          
   
                                          
   
                                          
   
                                          
   
                                          
   
                                          
   
                                          
   
                                          
   
                                          
   
                                          
   
                                          
   
                                          
   
                                          
   
                                          
   
                                          
   
                                          
   
                                          
   
                                          
   
                                          
   
                                          
   
                                          
   
                                          
   
                                          
   
                                          
   
                                          
   
                                          
   
                                          
   
                                          
   
                                          
   
                                          
   
                                          
   
                                          
   
                                          
   
                                          
   
                                          
   
                                          
   
                                          
   
                                          
   
                                          
   
                                          
   
                                          
   
                                          
   
                                          
   
                                          
   
                                          
   
                                          
   
                                          
   
                                          
   
                                          
   
                                          
   
                                          
   
                                          
   
                                          
   
                                          
   
                                          
   
                                          
   
                                          
   
                                          
   
                                          
   
                                          
   
                                          
   
                                          
   
                                          
   
                                          
   
                                          
   
                                          
   
                                          
   
                                          
   
                                          
   
                                          
   
                                          
   
                                          
   
                                          
   
                                          
   
                                          
   
                                          
   
                                          
   
                                          
   
                                          
   
                                          
   
                                          
   
                                          
   
                                          
   
                                          
   
                                          
   
                                          
   
                                          
   
                                          
   
                                          
   
                                          
   
                                          
   
                                          
   
                                          
   
                                          
   
                                          
   
                                          
   
                                          
   
                                          
   
                                          
   
                                          
   
                                          
   
                                          
   
                                          
   
                                          
   
                                          
   
                                          
   
                                          
   
                                          
   
                                          
   
                                          
   
                                          
   
                                          
   
                                          
   
                                          
   
                                          
   
                                          
   
                                          
   
                                          
   
                                          
   
                                          
   
                                          
   
                                          
   
                                          
   
                                          
   
                                          
   
                                          
   
                                          
   
                                          
   
                                          
   
                                          
   
                                          
   
                                          
   
                                          
   
                                          
   
                                          
   
                                          
   
                                          
   
                                          
   
                                          
   
                                          
   
                                          
   
                                          
   
                                          
   
                                          
   
                                          
   
                                          
   
                                          
   
                                          
   
                                          
   
                                          
   
                                          
   
                                          
   
                                          
   
                                          
   
                                          
   
                                          
   
                                          
   
                                          
   
                                          
   
                                          
   
                                          
   
                                          
   
                                          
   
                                          
   
                                          
   
                                          
   
                                          
   
                                          
   
                                          
   
                                          
   
                                          
   
                                          
   
                                          
   
                                          
   
                                          
   
                                          
   
                                          
   
                                          
   
                                          
   
                                          
   
                                          
   
                                          
   
                                          
   
                                          
   
                                          
   
                                          
   
                                          
   
                                          
   
                                          
   
                                          
   
                                          
   
                                          
   
                                          
   
                                          
   
                                          
   
                                          
   
                                          
   
                                          
   
                                          
   
                                          
   
                                          
   
                                          
   
                                          
   
                                          
   
                                          
   
                                          
   
                                          
   
                                          
   
                                          
   
                                          
   
                                          
   
                                          
   
                                          
   
                                          
   
                                          
   
                                          
   
                                          
   
                                          
   
                                          
   
                                          
   
                                          
   
                                          
   
                                          
   
                                          
   
                                          
   
                                          
   
                                          
   
                                          
   
                                          
   
                                          
   
                                          
   
                                          
   
                                          
   
                                          
   
                                          
   
                                          
   
                                          
   
                                          
   
                                          
   
                                          
   
                                          
   
                                          
   
                                          
   
                                          
   
                                          
   
                                          
   
                                          
   
                                          
   
                                          
   
                                          
   
                                          
   
                                          
   
                                          
   
                                          
   
                                          
   
                                          
   
                                          
   
                                          
   
                                          
   
                                          
   
                                          
   
                                          
   
                                          
   
                                          
   
                                          
   
                                          
   
                                          
   
                                          
   
                                          
   
                                          
   
                                          
   
                                          
   
                                          
   
                                          
   
                                          
   
                                          
   
                                          
   
                                          
   
                                          
   
                                          
   
                                          
   
                                          
   
                                          
   
                                          
   
                                          
   
                                          
   
                                          
   
                                          
   
                                          
   
                                          
   
                                          
   
                                          
   
                                          
   
                                          
   
                                          
   
                                          
   
                                          
   
                                          
   
                                          
   
                                          
   
                                          
   
                                          
   
                                          
   
                                          
   
                                          
   
                                          
   
                                          
   
                                          
   
                                          
   
                                          
   
                                          
   
                                          
   
                                          
   
                                          
   
                                          
   
                                          
   
                                          
   
                                          
   
                                          
   
                                          
   
                                          
   
                                          
   
                                          
   
                                          
   
                                          
   
                                          
   
                                          
   
                                          
   
                                          
   
                                          
   
                                          
   
                                          
   
                                          
   
                                          
   
                                          
   
                                          
   
                                          
   
                                          
   
                                          
   
                                          
   
                                          
   
                                          
   
                                          
   
                                          
   
                                          
   
                                          
   
                                          
   
                                          
   
                                          
   
                                          
   
                                          
   
                                          
   
                                          
   
                                          
   
                                          
   
                                          
   
                                          
   
                                          
   
                                          
   
                                          
   
                                          
   
                                          
   
                                          
   
                                          
   
                                          
   
                                          
   
                                          
   
                                          
   
                                          
   
                                          
   
                                          
   
                                          
   
                                          
   
                                          
   
                                          
   
                                          
   
                                          
   
                                          
   
                                          
   
                                          
   
                                          
   
                                          
   
                                          
   
                                          
   
                                          
   
                                          
   
                                          
   
                                          
   
                                          
   
                                          
   
                                          
   
                                          
   
                                          
   
                                          
   
                                          
   
                                          
   
                                          
   
                                          
   
                                          
   
                                          
   
                                          
   
                                          
   
                                          
   
                                          
   
                                          
   
                                          
   
                                          
   
                                          
   
                                          
   
                                          
   
                                          
   
                                          
   
                                          
   
                                          
   
                                          
   
                                          
   
                                          
   
                                          
   
                                          
   
                                          
   
                                          
   
                                          
   
                                          
   
                                          
   
                                          
   
                                          
   
                                          
   
                                          
   
                                          
   
                                          
   
                                          
   
                                          
   
                                          
   
                                          
   
                                          
   
                                          
   
                                          
   
                                          
   
                                          
   
                                          
   
                                          
   
                                          
   
                                          
   
                                          
   
                                          
   
                                          
   
                                          
   
                                          
   
                                          
   
                                          
   
                                          
   
                                          
   
                                          
   
                                          
   
                                          
   
                                          
   
                                          
   
                                          
   
                                          
   
                                          
   
                                          
   
                                          
   
                                          
   
                                          
   
                                          
   
                                          
   
                                          
   
                                          
   
                                          
   
                                          
   
                                          
   
                                          
   
                                          
   
                                          
   
                                          
   
                                          
   
                                          
   
                                          
   
                                          
   
                                          
   
                                          
   
                                          
   
                                          
   
                                          
   
                                          
   
                                          
   
                                          
   
                                          
   
                                          
   
                                          
   
                                          
   
                                          
   
                                          
   
                                          
   
                                          
   
                                          
   
                                          
   
                                          
   
                                          
   
                                          
   
                                          
   
                                          
   
                                          
   
                                          
   
                                          
   
                                          
   
                                          
   
                                          
   
                                          
   
                                          
   
                                          
   
                                          
   
                                          
   
                                          
   
                                          
   
                                          
   
                                          
   
                                          
   
                                          
   
                                          
   
                                          
   
                                          
   
                                          
   
                                          
   
                                          
   
                                          
   
                                          
   
                                          
   
                                          
   
                                          
   
                                          
   
                                          
   
                                          
   
                                          
   
                                          
   
                                          
   
                                          
   
                                          
   
                                          
   
                                          
   
                                          
   
                                          
   
                                          
   
                                          
   
                                          
   
                                          
   
                                          
   
                                          
   
                                          
   
                                          
   
                                          
   
                                          
   
                                          
   
                                          
   
                                          
   
                                          
   
                                          
   
                                          
   
                                          
   
                                          
   
                                          
   
                                          
   
                                          
   
                                          
   
                                          
   
                                          
   
                                          
   
                                          
   
                                          
   
                                          
   
                                          
   
                                          
   
                                          
   
                                          
   
                                          
   
                                          
   
                                          
   
                                          
   
                                          
   
                                          
   
                                          
   
                                          
   
                                          
   
                                          
   
                                          
   
                                          
   
                                          
   
                                          
   
                                          
   
                                          
   
                                          
   
                                          
   
                                          
   
                                          
   
                                          
   
                                          
   
                                          
   
                                          
   
                                          
   
                                          
   
                                          
   
                                          
   
                                          
   
                                          
   
                                          
   
                                          
   
                                          
   
                                          
   
                                          
   
                                          
   
                                          
   
                                          
   
                                          
   
                                          
   
                                          
   
                                          
   
                                          
   
                                          
   
                                          
   
                                          
   
                                          
   
                                          
   
                                          
   
                                          
   
                                          
   
                                          
   
                                          
   
                                          
   
                                          
   
                                          
   
                                          
   
                                          
   
                                          
   
                                          
   
                                          
   
                                          
   
                                          
   
                                          
   
                                          
   
                                          
   
                                          
   
                                          
   
                                          
   
                                          
   
                                          
   
                                          
   
                                          
   
                                          
   
                                          
   
                                          
   
                                          
   
                                          
   
                                          
   
                                          
   
                                          
   
                                          
   
                                          
   
                                          
   
                                          
   
                                          
   
                                          
   
                                          
   
                                          
   
                                          
   
                                          
   
                                          
   
                                          
   
                                          
   
                                          
   
                                          
   
                                          
   
                                          
   
                                          
   
                                          
   
                                          
   
                                          
   
                                          
   
                                          
   
                                          
   
                                          
   
                                          
   
                                          
   
                                          
   
                                          
   
                                          
   
                                          
   
                                          
   
                                          
   
                                          
   
                                          
   
                                          
   
                                          
   
                                          
   
                                          
   
                                          
   
                                          
   
                                          
   
                                          
   
                                          
   
                                          
   
                                          
   
                                          
   
                                          
   
                                          
   
                                          
   
                                          
   
                                          
   
                                          
   
                                          
   
                                          
   
                                          
   
                                          
   
                                          
   
                                          
   
                                          
   
                                          
   
                                          
   
                                          
   
                                          
   
                                          
   
                                          
   
                                          
   
                                          
   
                                          
   
                                          
   
                                          
   
                                          
   
                                          
   
                                          
   
                                          
   
                                          
   
                                          
   
                                          
   
                                          
   
                                          
   
                                          
   
                                          
   
                                          
   
                                          
   
                                          
   
                                          
   
                                          
   
                                          
   
                                          
   
                                          
   
                                          
   
                                          
   
                                          
   
                                          
   
                                          
   
                                          
   
                                          
   
                                          
   
                                          
   
                                          
   
                                          
   
                                          
   
                                          
   
                                          
   
                                          
   
                                          
   
                                          
   
                                          
   
                                          
   
                                          
   
                                          
   
                                          
   
                                          
   
                                          
   
                                          
   
                                          
   
                                          
   
                                          
   
                                          
   
                                          
   
                                          
   
                                          
   
                                          
   
                                          
   
                                          
   
                                          
   
                                          
   
                                          
   
                                          
   
                                          
   
                                          
   
                                          
   
                                          
   
                                          
   
                                          
   
                                          
   
                                          
   
                                          
   
                                          
   
                                          
   
                                          
   
                                          
   
                                          
   
                                          
   
                                          
   
                                          
   
                                          
   
                                          
   
                                          
   
                                          
   
                                          
   
                                          
   
                                          
   
                                          
   
                                          
   
                                          
   
                                          
   
                                          
   
                                          
   
                                          
   
                                          
   
                                          
   
                                          
   
                                          
   
                                          
   
                                          
   
                                          
   
                                          
   
                                          
   
                                          
   
                                          
   
                                          
   
                                          
   
                                          
   
                                          
   
                                          
   
                                          
   
                                          
   
                                          
   
                                          
   
                                          
   
                                          
   
                                          
   
                                          
   
                                          
   
                                          
   
                                          
   
                                          
   
                                          
   
                                          
   
                                          
   
                                          
   
                                          
   
                                          
   
                                          
   
                                          
   
                                          
   
                                          
   
                                          
   
                                          
   
                                          
   
                                          
   
                                          
   
                                          
   
                                          
   
                                          
   
                                          
   
                                          
   
                                          
   
                                          
   
                                          
   
                                          
   
                                          
   
                                          
   
                                          
   
                                          
   
                                          
   
                                          
   
                                          
   
                                          
   
                                          
   
                                          
   
                                          
   
                                          
   
                                          
   
                                          
   
                                          
   
                                          
   
                                          
   
                                          
   
                                          
   
                                          
   
                                          
   
                                          
   
                                          
   
                                          
   
                                          
   
                                          
   
                                          
   
                                          
   
                                          
   
                                          
   
                                          
   
                                          
   
                                          
   
                                          
   
                                          
   
                                          
   
                                          
   
                                          
   
                                          
   
                                          
   
                                          
   
                                          
   
                                          
   
                                          
   
                                          
   
                                          
   
                                          
   
                                          
   
                                          
   
                                          
   
                                          
   
                                          
   
                                          
   
                                          
   
                                          
   
                                          
   
                                          
   
                                          
   
                                          
   
                                          
   
                                          
   
                                          
   
                                          
   
                                          
   
                                          
   
                                          
   
                                          
   
                                          
   
                                          
   
                                          
   
                                          
   
                                          
   
                                          
   
                                          
   
                                          
   
                                          
   
                                          
   
                                          
   
                                          
   
                                          
   
                                          
   
                                          
   
                                          
   
                                          
   
                                          
   
                                          
   
                                          
   
                                          
   
                                          
   
                                          
   
                                          
   
                                          
   
                                          
   
                                          
   
                                          
   
                                          
   
                                          
   
                                          
   
                                          
   
                                          
   
                                          
   
                                          
   
                                          
   
                                          
   
                                          
   
                                          
   
                                          
   
                                          
   
                                          
   
                                          
   
                                          
   
                                          
   
                                          
   
                                          
   
                                          
   
                                          
   
                                          
   
                                          
   
                                          
   
                                          
   
                                          
   
                                          
   
                                          
   
                                          
   
                                          
   
                                          
   
                                          
   
                                          
   
                                          
   
                                          
   
                                          
   
                                          
   
                                          
   
                                          
   
                                          
   
                                          
   
                                          
   
                                          
   
                                          
   
                                          
   
                                          
   
                                          
   
                                          
   
                                          
   
                                          
   
                                          
   
                                          
   
                                          
   
                                          
   
                                          
   
                                          
   
                                          
   
                                          
   
                                          
   
                                          
   
                                          
   
                                          
   
                                          
   
                                          
   
                                          
   
                                          
   
                                          
   
                                          
   
                                          
   
                                          
   
                                          
   
                                          
   
                                          
   
                                          
   
                                          
   
                                          
   
                                          
   
                                          
   
                                          
   
                                          
   
                                          
   
                                          
   
                                          
   
                                          
   
                                          
   
                                          
   
                                          
   
                                          
   
                                          
   
                                          
   
                                          
   
                                          
   
                                          
   
                                          
   
                                          
   
                                          
   
                                          
   
                                          
   
                                          
   
                                          
   
                                          
   
                                          
   
                                          
   
                                          
   
                                          
   
                                          
   
                                          
   
                                          
   
                                          
   
                                          
   
                                          
   
                                          
   
                                          
   
                                          
   
                                          
   
                                          
   
                                          
   
                                          
   
                                          
   
                                          
   
                                          
   
                                          
   
                                          
   
                                          
   
                                          
   
                                          
   
                                          
   
                                          
   
                                          
   
                                          
   
                                          
   
                                          
   
                                          
   
                                          
   
                                          
   
                                          
   
                                          
   
                                          
   
                                          
   
                                          
   
                                          
   
                                          
   
                                          
   
                                          
   
                                          
   
                                          
   
                                          
   
                                          
   
                                          
   
                                          
   
                                          
   
                                          
   
                                          
   
                                          
   
                                          
   
                                          
   
                                          
   
                                          
   
                                          
   
                                          
   
                                          
   
                                          
   
                                          
   
                                          
   
                                          
   
                                          
   
                                          
   
                                          
   
                                          
   
                                          
   
                                          
   
                                          
   
                                          
   
                                          
   
                                          
   
                                          
   
                                          
   
                                          
   
                                          
   
                                          
   
                                          
   
                                          
   
                                          
   
                                          
   
                                          
   
                                          
   
                                          
   
                                          
   
                                          
   
                                          
   
                                          
   
                                          
   
                                          
   
                                          
   
                                          
   
                                          
   
                                          
   
                                          
   
                                          
   
                                          
   
                                          
   
                                          
   
                                          
   
                                          
   
                                          
   
                                          
   
                                          
   
                                          
   
                                          
   
                                          
   
                                          
   
                                          
   
                                          
   
                                          
   
                                          
   
                                          
   
                                          
   
                                          
   
                                          
   
                                          
   
                                          
   
                                          
   
                                          
   
                                          
   
                                          
   
                                          
   
                                          
   
                                          
   
                                          
   
                                          
   
                                          
   
                                          
   
                                          
   
                                          
   
                                          
   
                                          
   
                                          
   
                                          
   
                                          
   
                                          
   
                                          
   
                                          
   
                                          
   
                                          
   
                                          
   
                                          
   
                                          
   
                                          
   
                                          
   
                                          
   
                                          
   
                                          
   
                                          
   
                                          
   
                                          
   
                                          
   
                                          
   
                                          
   
                                          
   
                                          
   
                                          
   
                                          
   
                                          
   
                                          
   
                                          
   
                                          
   
                                          
   
                                          
   
                                          
   
                                          
   
                                          
   
                                          
   
                                          
   
                                          
   
                                          
   
                                          
   
                                          
   
                                          
   
                                          
   
                                          
   
                                          
   
                                          
   
                                          
   
                                          
   
                                          
   
                                          
   
                                          
   
                                          
   
                                          
   
                                          
   
                                          
   
                                          
   
                                          
   
                                          
   
                                          
   
                                          
   
                                          
   
                                          
   
                                          
   
                                          
   
                                          
   
                                          
   
                                          
   
                                          
   
                                          
   
                                          
   
                                          
   
                                          
   
                                          
   
                                          
   
                                          
   
                                          
   
                                          
   
                                          
   
                                          
   
                                          
   
                                          
   
                                          
   
                                          
   
                                          
   
                                          
   
                                          
   
                                          
   
                                          
   
                                          
   
                                          
   
                                          
   
                                          
   
                                          
   
                                          
   
                                          
   
                                          
   
                                          
   
                                          
   
                                          
   
                                          
   
                                          
   
                                          
   
                                          
   
                                          
   
                                          
   
                                          
   
                                          
   
                                          
   
                                          
   
                                          
   
                                          
   
                                          
   
                                          
   
                                          
   
                                          
   
                                          
   
                                          
   
                                          
   
                                          
   
                                          
   
                                          
   
                                          
   
                                          
   
                                          
   
                                          
   
                                          
   
                                          
   
                                          
   
                                          
   
                                          
   
                                          
   
                                          
   
                                          
   
                                          
   
                                          
   
                                          
   
                                          
   
                                          
   
                                          
   
                                          
   
                                          
   
                                          
   
                                          
   
                                          
   
                                          
   
                                          
   
                                          
   
                                          
   
                                          
   
                                          
   
                                          
   
                                          
   
                                          
   
                                          
   
                                          
   
                                          
   
                                          
   
                                          
   
                                          
   
                                          
   
                                          
   
                                          
  
  
  
Vital Signs  
  
  
                      Date Time  Vital Sign Value      [facility]  
[facility]  
   
                                                    10-   
14:          Body height         177.8 cm            Juanito Esparza MD  
Work Phone:   
[phone]                          [facility]  
   
                                                    10-   
14:          Body temperature    97.81 [degF]        Juanito Esparza MD  
Work Phone:   
[phone]                          [facility]  
   
                                                    10-   
14:          Body weight         111.86 kg           Juanito Esparza MD  
Work Phone:   
[phone]                          [facility]  
   
                                                    10-   
14:                              Diastolic blood   
pressure                  89 mm[Hg]                 Juanito Esparza MD  
Work Phone:   
[phone]                          [facility]  
   
                                                    10-   
14:          Heart rate          129 /min            Juanito Esparza MD  
Work Phone:   
[phone]                          [facility]  
   
                                                    10-   
14:                              SaO2% (BldA) [Mass   
fraction]                 98 %                      Juanito Esparza MD  
Work Phone:   
[phone]                          [facility]  
   
                                                    10-   
14:                              Systolic blood   
pressure                  118 mm[Hg]                Juanito Esparza MD  
Work Phone:   
[phone]                          [facility]  
   
                                                    2023   
09:          Body temperature    97.5 [degF]         [facility]  
Work Phone:   
[phone]                          [facility]  
   
                                                    2023   
09:                              Diastolic blood   
pressure                  79 mm[Hg]                 [facility]  
Work Phone:   
[phone]                          [facility]  
   
                                                    2023   
09:          Heart rate          100 /min            [facility]  
Work Phone:   
[phone]                          [facility]  
   
                                                    2023   
09:                              Systolic blood   
pressure                  131 mm[Hg]                [facility]  
Work Phone:   
[phone]                          [facility]  
   
                                                    2023   
11:          Body temperature    97 [degF]           [facility]  
Work Phone:   
[phone]                          [facility]  
   
                                                    2023   
11:                              Diastolic blood   
pressure                  77 mm[Hg]                 [facility]  
Work Phone:   
[phone]                          [facility]  
   
                                                    2023   
11:          Heart rate          70 /min             [facility]  
Work Phone:   
[phone]                          [facility]  
   
                                                    2023   
11:                              Systolic blood   
pressure                  127 mm[Hg]                [facility]  
Work Phone:   
[phone]                          [facility]  
   
                                                    2023   
09:          Body temperature    97.9 [degF]         [facility]  
Work Phone:   
[phone]                          [facility]  
   
                                                    2023   
09:                              Diastolic blood   
pressure                  89 mm[Hg]                 [facility]  
Work Phone:   
[phone]                          [facility]  
   
                                                    2023   
09:          Heart rate          71 /min             [facility]  
Work Phone:   
[phone]                          [facility]  
   
                                                    2023   
09:                              Systolic blood   
pressure                  133 mm[Hg]                [facility]  
Work Phone:   
[phone]                          [facility]  
   
                                                    2023   
09:          Body temperature    98.6 [degF]         [facility]  
Work Phone:   
[phone]                          [facility]  
   
                                                    2023   
09:                              Diastolic blood   
pressure                  65 mm[Hg]                 [facility]  
Work Phone:   
[phone]                          [facility]  
   
                                                    2023   
09:          Heart rate          65 /min             [facility]  
Work Phone:   
[phone]                          [facility]  
   
                                                    2023   
09:                              SaO2% (BldA) [Mass   
fraction]                 95 %                      [facility]  
Work Phone:   
[phone]                          [facility]  
   
                                                    2023   
09:                              Systolic blood   
pressure                  108 mm[Hg]                [facility]  
Work Phone:   
[phone]                          [facility]  
   
                                                    2023   
09:          Body temperature    97.9 [degF]         [facility]  
Work Phone:   
[phone]                          [facility]  
   
                                                    2023   
09:                              Diastolic blood   
pressure                  68 mm[Hg]                 [facility]  
Work Phone:   
[phone]                          [facility]  
   
                                                    2023   
09:          Heart rate          60 /min             [facility]  
Work Phone:   
[phone]                          [facility]  
   
                                                    2023   
09:                              SaO2% (BldA) [Mass   
fraction]                 98 %                      [facility]  
Work Phone:   
[phone]                          [facility]  
   
                                                    2023   
09:                              Systolic blood   
pressure                  107 mm[Hg]                [facility]  
Work Phone:   
[phone]                          [facility]  
   
                                                    2023   
15:          Body height         171 cm              Tello Masci DO  
Work Phone:   
[phone]                          [facility]  
   
                                                    2023   
15:          Body temperature    97.9 [degF]         Tello Masci DO  
Work Phone:   
[phone]                          [facility]  
   
                                                    2023   
15:          Body weight         109.77 kg           Tello Masci DO  
Work Phone:   
[phone]                          [facility]  
   
                                                    2023   
15:                              Diastolic blood   
pressure                  63 mm[Hg]                 Tello Masci DO  
Work Phone:   
[phone]                          [facility]  
   
                                                    2023   
15:          Heart rate          70 /min             Tello Masci DO  
Work Phone:   
[phone]                          [facility]  
   
                                                    2023   
15:                              SaO2% (BldA) [Mass   
fraction]                 100 %                     Tello Ortega DO  
Work Phone:   
[phone]                          [facility]  
   
                                                    2023   
15:                              Systolic blood   
pressure                  108 mm[Hg]                Tello Ortega DO  
Work Phone:   
[phone]                          [facility]  
   
                                                    2023   
14:          Body temperature    97.9 [degF]         [facility]  
Work Phone:   
[phone]                          [facility]  
   
                                                    2023   
14:          Body weight         107.32 kg           [facility]  
Work Phone:   
[phone]                          [facility]  
   
                                                    2023   
14:                              Diastolic blood   
pressure                  65 mm[Hg]                 [facility]  
Work Phone:   
[phone]                          [facility]  
   
                                                    2023   
14:          Heart rate          75 /min             [facility]  
Work Phone:   
[phone]                          [facility]  
   
                                                    2023   
14:          Respiratory rate    16 /min             [facility]  
Work Phone:   
[phone]                          [facility]  
   
                                                    2023   
14:                              SaO2% (BldA) [Mass   
fraction]                 96 %                      [facility]  
Work Phone:   
[phone]                          [facility]  
   
                                                    2023   
14:                              Systolic blood   
pressure                  111 mm[Hg]                [facility]  
Work Phone:   
[phone]                          [facility]  
   
                                                    2023   
09:          Body height         177.8 cm            Juanito Esparza MD  
Work Phone:   
[phone]                          [facility]  
   
                                                    2023   
09:          Body temperature    97.3 [degF]         Juanito Esparza MD  
Work Phone:   
[phone]                          [facility]  
   
                                                    2023   
09:          Body weight         111.95 kg           Juanito Esparza MD  
Work Phone:   
[phone]                          [facility]  
   
                                                    2023   
09:                              Diastolic blood   
pressure                  60 mm[Hg]                 Juanito Esparza MD  
Work Phone:   
[phone]                          [facility]  
   
                                                    2023   
09:          Heart rate          77 /min             Juanito Esparza MD  
Work Phone:   
[phone]                          [facility]  
   
                                                    2023   
09:                              SaO2% (BldA) [Mass   
fraction]                 98 %                      Juanito Esparza MD  
Work Phone:   
[phone]                          [facility]  
   
                                                    2023   
09:                              Systolic blood   
pressure                  113 mm[Hg]                Juanito Esparza MD  
Work Phone:   
[phone]                          [facility]  
   
                                                    2023   
10:          Body weight         108.86 kg           Marin Fish MD  
Work Phone:   
[phone]                          [facility]  
   
                                                    2023   
10:                              Diastolic blood   
pressure                  58 mm[Hg]                 Marin Fish MD  
Work Phone:   
[phone]                          [facility]  
   
                                                    2023   
10:          Heart rate          70 /min             Marin Fish MD  
Work Phone:   
[phone]                          [facility]  
   
                                                    2023   
10:          Respiratory rate    18 /min             Marin Fish MD  
Work Phone:   
[phone]                          [facility]  
   
                                                    2023   
10:                              SaO2% (BldA) [Mass   
fraction]                 89 %                      Mrain Fish MD  
Work Phone:   
[phone]                          [facility]  
   
                                                    2023   
10:                              Systolic blood   
pressure                  106 mm[Hg]                Marin Fish MD  
Work Phone:   
[phone]                          [facility]  
   
                                                    2023   
18:          Body weight         109.32 kg           Júnior Ahuja MD  
Work Phone:   
[phone]                          [facility]  
   
                                                    2023   
18:                              Diastolic blood   
pressure                  72 mm[Hg]                 Júnior Ahuja MD  
Work Phone:   
[phone]                          [facility]  
   
                                                    2023   
18:          Heart rate          72 /min             Júnior Ahuja MD  
Work Phone:   
[phone]                          [facility]  
   
                                                    2023   
18:                              SaO2% (BldA) [Mass   
fraction]                 100 %                     Júnior Ahuja MD  
Work Phone:   
[phone]                          [facility]  
   
                                                    2023   
18:                              Systolic blood   
pressure                  134 mm[Hg]                Júnior Ahuja MD  
Work Phone:   
[phone]                          [facility]  
   
                                                    03-   
14:          Body temperature    98.01 [degF]        Marin Rankin MD  
Work Phone:   
[phone]                          [facility]  
   
                                                    03-   
14:          Body weight         108.23 kg           Marin Rankin MD  
Work Phone:   
[phone]                          [facility]  
   
                                                    03-   
14:                              Diastolic blood   
pressure                  82 mm[Hg]                 Marin Rankin MD  
Work Phone:   
[phone]                          [facility]  
   
                                                    03-   
14:          Heart rate          92 /min             Marin Rankin MD  
Work Phone:   
[phone]                          [facility]  
   
                                                    03-   
14:          Respiratory rate    18 /min             Marin Rankin MD  
Work Phone:   
[phone]                          [facility]  
   
                                                    03-   
14:                              SaO2% (BldA) [Mass   
fraction]                 98 %                      Marin Rankin MD  
Work Phone:   
[phone]                          [facility]  
   
                                                    03-   
14:                              Systolic blood   
pressure                  132 mm[Hg]                Marin Rankin MD  
Work Phone:   
[phone]                          [facility]  
   
                                                    2023   
09:          Body temperature    97.3 [degF]         Tello Ortega DO  
Work Phone:   
[phone]                          [facility]  
   
                                                    2023   
09:          Body weight         108.18 kg           Tello Harti DO  
Work Phone:   
[phone]                          [facility]  
   
                                                    2023   
09:                              Diastolic blood   
pressure                  72 mm[Hg]                 Tello Tanneri DO  
Work Phone:   
[phone]                          [facility]  
   
                                                    2023   
09:          Heart rate          66 /min             Tello Harti DO  
Work Phone:   
[phone]                          [facility]  
   
                                                    2023   
09:                              SaO2% (BldA) [Mass   
fraction]                 99 %                      Tello Oretga DO  
Work Phone:   
[phone]                          [facility]  
   
                                                    2023   
09:                              Systolic blood   
pressure                  122 mm[Hg]                Tello Ortega DO  
Work Phone:   
[phone]                          [facility]  
   
                                                    02-   
15:          Body temperature    98.2 [degF]         [facility]  
Work Phone:   
[phone]                          [facility]  
   
                                                    02-   
15:                              Diastolic blood   
pressure                  92 mm[Hg]                 [facility]  
Work Phone:   
[phone]                          [facility]  
   
                                                    02-   
15:          Heart rate          75 /min             [facility]  
Work Phone:   
[phone]                          [facility]  
   
                                                    02-   
15:          Respiratory rate    16 /min             [facility]  
Work Phone:   
[phone]                          [facility]  
   
                                                    02-   
15:                              SaO2% (BldA) [Mass   
fraction]                 100 %                     [facility]  
Work Phone:   
[phone]                          [facility]  
   
                                                    02-   
15:                              Systolic blood   
pressure                  129 mm[Hg]                [facility]  
Work Phone:   
[phone]                          [facility]  
   
                                                    2023   
15:          Body temperature    96.91 [degF]        [facility]  
Work Phone:   
[phone]                          [facility]  
   
                                                    2023   
15:                              Diastolic blood   
pressure                  74 mm[Hg]                 [facility]  
Work Phone:   
[phone]                          [facility]  
   
                                                    2023   
15:          Heart rate          73 /min             [facility]  
Work Phone:   
[phone]                          [facility]  
   
                                                    2023   
15:          Respiratory rate    18 /min             [facility]  
Work Phone:   
[phone]                          [facility]  
   
                                                    2023   
15:                              SaO2% (BldA) [Mass   
fraction]                 100 %                     [facility]  
Work Phone:   
[phone]                          [facility]  
   
                                                    2023   
15:                              Systolic blood   
pressure                  131 mm[Hg]                [facility]  
Work Phone:   
[phone]                          [facility]  
   
                                                    02-   
14:          Body temperature    97.2 [degF]         [facility]  
Work Phone:   
[phone]                          [facility]  
   
                                                    02-   
14:                              Diastolic blood   
pressure                  74 mm[Hg]                 [facility]  
Work Phone:   
[phone]                          [facility]  
   
                                                    02-   
14:          Heart rate          91 /min             [facility]  
Work Phone:   
[phone]                          [facility]  
   
                                                    02-   
14:          Respiratory rate    16 /min             [facility]  
Work Phone:   
[phone]                          [facility]  
   
                                                    02-   
14:                              SaO2% (BldA) [Mass   
fraction]                 98 %                      [facility]  
Work Phone:   
[phone]                          [facility]  
   
                                                    02-   
14:                              Systolic blood   
pressure                  136 mm[Hg]                [facility]  
Work Phone:   
[phone]                          [facility]  
   
                                                    2023   
15:          Body temperature    97.59 [degF]        [facility]  
Work Phone:   
[phone]                          [facility]  
   
                                                    2023   
15:                              Diastolic blood   
pressure                  90 mm[Hg]                 [facility]  
Work Phone:   
[phone]                          [facility]  
   
                                                    2023   
15:          Heart rate          88 /min             [facility]  
Work Phone:   
[phone]                          [facility]  
   
                                                    2023   
15:          Respiratory rate    18 /min             [facility]  
Work Phone:   
[phone]                          [facility]  
   
                                                    2023   
15:                              SaO2% (BldA) [Mass   
fraction]                 99 %                      [facility]  
Work Phone:   
[phone]                          [facility]  
   
                                                    2023   
15:                              Systolic blood   
pressure                  151 mm[Hg]                [facility]  
Work Phone:   
[phone]                          [facility]  
   
                                                    2023   
15:          Body temperature    97.2 [degF]         [facility]  
Work Phone:   
[phone]                          [facility]  
   
                                                    2023   
15:                              Diastolic blood   
pressure                  72 mm[Hg]                 [facility]  
Work Phone:   
[phone]                          [facility]  
   
                                                    2023   
15:          Heart rate          87 /min             [facility]  
Work Phone:   
[phone]                          [facility]  
   
                                                    2023   
15:                              Systolic blood   
pressure                  144 mm[Hg]                [facility]  
Work Phone:   
[phone]                          [facility]  
   
                                                    2023   
15:          Body temperature    96.6 [degF]         [facility]  
Work Phone:   
[phone]                          [facility]  
   
                                                    2023   
15:                              Diastolic blood   
pressure                  77 mm[Hg]                 [facility]  
Work Phone:   
[phone]                          [facility]  
   
                                                    2023   
15:          Heart rate          86 /min             [facility]  
Work Phone:   
[phone]                          [facility]  
   
                                                    2023   
15:          Respiratory rate    16 /min             [facility]  
Work Phone:   
[phone]                          [facility]  
   
                                                    2023   
15:                              SaO2% (BldA) [Mass   
fraction]                 98 %                      [facility]  
Work Phone:   
[phone]                          [facility]  
   
                                                    2023   
15:                              Systolic blood   
pressure                  147 mm[Hg]                [facility]  
Work Phone:   
[phone]                          [facility]  
   
                                                    2023   
14:          Body temperature    97.11 [degF]        [facility]  
Work Phone:   
[phone]                          [facility]  
   
                                                    2023   
14:                              Diastolic blood   
pressure                  69 mm[Hg]                 [facility]  
Work Phone:   
[phone]                          [facility]  
   
                                                    2023   
14:          Heart rate          70 /min             [facility]  
Work Phone:   
[phone]                          [facility]  
   
                                                    2023   
14:                              SaO2% (BldA) [Mass   
fraction]                 96 %                      [facility]  
Work Phone:   
[phone]                          [facility]  
   
                                                    2023   
14:                              Systolic blood   
pressure                  126 mm[Hg]                [facility]  
Work Phone:   
[phone]                          [facility]  
   
                                                    2023   
14:          Body temperature    97.81 [degF]        [facility]  
Work Phone:   
[phone]                          [facility]  
   
                                                    2023   
14:                              Diastolic blood   
pressure                  68 mm[Hg]                 [facility]  
Work Phone:   
[phone]                          [facility]  
   
                                                    2023   
14:          Heart rate          61 /min             [facility]  
Work Phone:   
[phone]                          [facility]  
   
                                                    2023   
14:                              SaO2% (BldA) [Mass   
fraction]                 100 %                     [facility]  
Work Phone:   
[phone]                          [facility]  
   
                                                    2023   
14:                              Systolic blood   
pressure                  128 mm[Hg]                [facility]  
Work Phone:   
[phone]                          [facility]  
   
                                                    2023   
15:          Body temperature    97.2 [degF]         Júnior Ahuja MD  
Work Phone:   
[phone]                          [facility]  
   
                                                    2023   
15:          Body weight         111.58 kg           Júnior Ahuja MD  
Work Phone:   
[phone]                          [facility]  
   
                                                    2023   
15:                              Diastolic blood   
pressure                  64 mm[Hg]                 Júnior Ahuja MD  
Work Phone:   
[phone]                          [facility]  
   
                                                    2023   
15:          Heart rate          76 /min             Júnior Ahuja MD  
Work Phone:   
[phone]                          [facility]  
   
                                                    2023   
15:          Respiratory rate    16 /min             Júnior Ahuja MD  
Work Phone:   
[phone]                          [facility]  
   
                                                    2023   
15:                              Systolic blood   
pressure                  114 mm[Hg]                Júnior Ahuja MD  
Work Phone:   
[phone]                          [facility]  
   
                                                    2023   
13:          Body height         170.2 cm            Nathalia Bernal PA-C  
Work Phone:   
[phone]                          [facility]  
   
                                                    2023   
13:          Body temperature    98.91 [degF]        Nathalia [place] PA-C  
Work Phone:   
[phone]                          [facility]  
   
                                                    2023   
13:          Body weight         114.31 kg           Nathalia [place] PA-C  
Work Phone:   
[phone]                          [facility]  
   
                                                    2023   
13:                              Diastolic blood   
pressure                  80 mm[Hg]                 Nathalia Dolores PA-C  
Work Phone:   
[phone]                          [facility] [facility]  
   
                                                    2023   
13:          Heart rate          110 /min            Nathalia [place] PA-C  
Work Phone:   
[phone]                          [facility]  
   
                                                    2023   
13:                              SaO2% (BldA) [Mass   
fraction]                 94 %                      Nathalia Dolores PA-C  
Work Phone:   
[phone]                          [facility]  
   
                                                    2023   
13:                              Systolic blood   
pressure                  140 mm[Hg]                Nathalia [place] PA-C  
Work Phone:   
[phone]                          [facility]  
   
                                                    2022   
15:          Body temperature    98.01 [degF]        Tello Masci DO  
Work Phone:   
[phone]                          [facility]  
   
                                                    2022   
15:          Body weight         110.9 kg            Tello Masci DO  
Work Phone:   
[phone]                          [facility]  
   
                                                    2022   
15:                              Diastolic blood   
pressure                  81 mm[Hg]                 Tello Masci DO  
Work Phone:   
[phone]                          [facility]  
   
                                                    2022   
15:          Heart rate          93 /min             Tello Masci DO  
Work Phone:   
[phone]                          [facility]  
   
                                                    2022   
15:                              Systolic blood   
pressure                  134 mm[Hg]                Tello Masci DO  
Work Phone:   
[phone]                          [facility]  
   
                                                    10-   
13:          Body temperature    97.39 [degF]        Júnior Ahuja MD  
Work Phone:   
[phone]                          [facility]  
   
                                                    10-   
13:          Body weight         112.95 kg           Júnior Ahuja MD  
Work Phone:   
[phone]                          [facility]  
   
                                                    10-   
13:                              Diastolic blood   
pressure                  56 mm[Hg]                 Júnior Ahuja MD  
Work Phone:   
[phone]                          [facility]  
   
                                                    10-   
13:          Heart rate          64 /min             Júnior Ahuja MD  
Work Phone:   
[phone]                          [facility]  
   
                                                    10-   
13:          Respiratory rate    16 /min             Júnior Ahuja MD  
Work Phone:   
[phone]                          [facility]  
   
                                                    10-   
13:                              Systolic blood   
pressure                  108 mm[Hg]                Júnior Ahuja MD  
Work Phone:   
[phone]                          [facility]  
   
                                                    10-   
10:          Body temperature    97.81 [degF]        Brenda Veraman-Gold PT  
Work Phone:   
[phone]                          [facility]  
   
                                                    10-   
10:                              Diastolic blood   
pressure                  58 mm[Hg]                 Brenda Veraman-Gold PT  
Work Phone:   
[phone]                          [facility]  
   
                                                    10-   
10:          Heart rate          62 /min             Brenda   
Halderman-Gold PT  
Work Phone:   
[phone]                          [facility]  
   
                                                    10-   
10:          Respiratory rate    16 /min             Brenda   
Halderman-Gold PT  
Work Phone:   
[phone]                          [facility]  
   
                                                    10-   
10:                              SaO2% (BldA) [Mass   
fraction]                 99 %                      Brenda Jorgederman-Gold PT  
Work Phone:   
[phone]                          [facility]  
   
                                                    10-   
10:                              Systolic blood   
pressure                  112 mm[Hg]                Brenda   
Halderman-Gold PT  
Work Phone:   
[phone]                          [facility]  
   
                                                    10-   
09:          Body temperature    98.6 [degF]         Lalitha Pily PTA  
Work Phone:   
[phone]                          [facility]  
   
                                                    10-   
09:                              Diastolic blood   
pressure                  60 mm[Hg]                 Lalitha Pily PTA  
Work Phone:   
[phone]                          [facility]  
   
                                                    10-   
09:          Heart rate          68 /min             Lalitha Pily PTA  
Work Phone:   
[phone]                          [facility]  
   
                                                    10-   
09:          Respiratory rate    18 /min             Lalitha Pily PTA  
Work Phone:   
[phone]                          [facility]  
   
                                                    10-   
09:                              SaO2% (BldA) [Mass   
fraction]                 99 %                      Lalitha Pily PTA  
Work Phone:   
[phone]                          [facility]  
   
                                                    10-   
09:                              Systolic blood   
pressure                  112 mm[Hg]                Lalitha Pily PTA  
Work Phone:   
[phone]                          [facility]  
   
                                                    10-   
14:          Body temperature    98.01 [degF]        Lalitha Pily PTA  
Work Phone:   
[phone]                          [facility]  
   
                                                    10-   
14:                              Diastolic blood   
pressure                  60 mm[Hg]                 Lalitha Pily PTA  
Work Phone:   
[phone]                          [facility]  
   
                                                    10-   
14:          Heart rate          64 /min             Lalitha Pily PTA  
Work Phone:   
[phone]                          [facility]  
   
                                                    10-   
14:          Respiratory rate    18 /min             Lalitha Pily PTA  
Work Phone:   
[phone]                          [facility]  
   
                                                    10-   
14:                              SaO2% (BldA) [Mass   
fraction]                 98 %                      Lalitha Pily PTA  
Work Phone:   
[phone]                          [facility]  
   
                                                    10-   
14:                              Systolic blood   
pressure                  110 mm[Hg]                Lalitha Pily PTA  
Work Phone:   
[phone]                          [facility]  
   
                                                    2022   
11:          Body temperature    98.2 [degF]         Neida Gutierrez PT  
Work Phone:   
[phone]                          [facility]  
   
                                                    2022   
11:                              Diastolic blood   
pressure                  60 mm[Hg]                 Neida Gutirerez PT  
Work Phone:   
[phone]                          [facility]  
   
                                                    2022   
11:          Heart rate          68 /min             Neida Gutierrez PT  
Work Phone:   
[phone]                          [facility]  
   
                                                    2022   
11:          Respiratory rate    16 /min             Neida Gutierrez PT  
Work Phone:   
[phone]                          [facility]  
   
                                                    2022   
11:                              SaO2% (BldA) [Mass   
fraction]                 100 %                     Neida Corralox PT  
Work Phone:   
[phone]                          [facility]  
   
                                                    2022   
11:                              Systolic blood   
pressure                  120 mm[Hg]                Nedia Gutierrez PT  
Work Phone:   
[phone]                          [facility]  
   
                                                    2022   
09:          Body temperature    97.39 [degF]        Lalitha Pily PTA  
Work Phone:   
[phone]                          [facility]  
   
                                                    2022   
09:                              Diastolic blood   
pressure                  64 mm[Hg]                 Lalitha Pily PTA  
Work Phone:   
[phone]                          [facility]  
   
                                                    2022   
09:          Heart rate          63 /min             Lalitha Pily PTA  
Work Phone:   
[phone]                          [facility]  
   
                                                    2022   
09:          Respiratory rate    18 /min             Lalitha Pily PTA  
Work Phone:   
[phone]                          [facility]  
   
                                                    2022   
09:                              SaO2% (BldA) [Mass   
fraction]                 97 %                      Lalitha Pily PTA  
Work Phone:   
[phone]                          [facility]  
   
                                                    2022   
09:                              Systolic blood   
pressure                  112 mm[Hg]                Lalitha Pily PTA  
Work Phone:   
[phone]                          [facility]  
   
                                                    2022   
10:                              Diastolic blood   
pressure                  60 mm[Hg]                 Brenda Hernandez PT  
Work Phone:   
[phone]                          [facility]  
   
                                                    2022   
10:          Heart rate          78 /min             Brenda Hernandez PT  
Work Phone:   
[phone]                          [facility]  
   
                                                    2022   
10:          Respiratory rate    18 /min             Brenda Hernandez PT  
Work Phone:   
[phone]                          [facility]  
   
                                                    2022   
10:                              SaO2% (BldA) [Mass   
fraction]                 99 %                      Brenda Hernandez PT  
Work Phone:   
[phone]                          [facility]  
   
                                                    2022   
10:                              Systolic blood   
pressure                  108 mm[Hg]                Brenda Hernandez PT  
Work Phone:   
[phone]                          [facility]  
   
                                                    2022   
09:          Body temperature    97.9 [degF]         Brenda Hernandez PT  
Work Phone:   
[phone]                          [facility]  
   
                                                    2022   
14:          Body temperature    97.39 [degF]        Lalitha Pily PTA  
Work Phone:   
[phone]                          [facility]  
   
                                                    2022   
14:                              Diastolic blood   
pressure                  68 mm[Hg]                 Lalitha Pily PTA  
Work Phone:   
[phone]                          [facility]  
   
                                                    2022   
14:          Heart rate          76 /min             Lalitha Pily PTA  
Work Phone:   
[phone]                          [facility]  
   
                                                    2022   
14:          Respiratory rate    18 /min             Lalitha Pily PTA  
Work Phone:   
[phone]                          [facility]  
   
                                                    2022   
14:                              SaO2% (BldA) [Mass   
fraction]                 99 %                      Lalitha Pily PTA  
Work Phone:   
[phone]                          [facility]  
   
                                                    2022   
14:                              Systolic blood   
pressure                  118 mm[Hg]                Lalitha Pily PTA  
Work Phone:   
[phone]                          [facility]  
   
                                                    2022   
02:          Body temperature    97.3 [degF]         Brenda Hernandez PT  
Work Phone:   
[phone]                          [facility]  
   
                                                    2022   
02:                              Diastolic blood   
pressure                  50 mm[Hg]                 Brenda Hernandez PT  
Work Phone:   
[phone]                          [facility]  
   
                                                    2022   
02:          Heart rate          55 /min             Brenda Hernandez PT  
Work Phone:   
[phone]                          [facility]  
   
                                                    2022   
02:          Respiratory rate    16 /min             Brenda Hernandez PT  
Work Phone:   
[phone]                          [facility]  
   
                                                    2022   
02:                              SaO2% (BldA) [Mass   
fraction]                 99 %                      Brenda Hernnadez PT  
Work Phone:   
[phone]                          [facility]  
   
                                                    2022   
02:                              Systolic blood   
pressure                  100 mm[Hg]                Brenda Hernandez PT  
Work Phone:   
[phone]                          [facility]  
   
                                                    2022   
14:          Body temperature    98.2 [degF]         Lalitha Pily PTA  
Work Phone:   
[phone]                          [facility]  
   
                                                    2022   
14:                              Diastolic blood   
pressure                  78 mm[Hg]                 Lalitha Pily PTA  
Work Phone:   
[phone]                          [facility]  
   
                                                    2022   
14:          Heart rate          78 /min             Lalitha Pily PTA  
Work Phone:   
[phone]                          [facility]  
   
                                                    2022   
14:          Respiratory rate    18 /min             Lalitha Pily PTA  
Work Phone:   
[phone]                          [facility]  
   
                                                    2022   
14:                              SaO2% (BldA) [Mass   
fraction]                 97 %                      Lalitha Pily PTA  
Work Phone:   
[phone]                          [facility]  
   
                                                    2022   
14:                              Systolic blood   
pressure                  134 mm[Hg]                Lalitha Pily PTA  
Work Phone:   
[phone]                          [facility]  
   
                                                    2022   
10:          Body temperature    97.81 [degF]        Lalitha Pily PTA  
Work Phone:   
[phone]                          [facility]  
   
                                                    2022   
10:                              Diastolic blood   
pressure                  70 mm[Hg]                 Lalitha Pily PTA  
Work Phone:   
[phone]                          [facility]  
   
                                                    2022   
10:          Heart rate          56 /min             Lalitha Pily PTA  
Work Phone:   
[phone]                          [facility]  
   
                                                    2022   
10:          Respiratory rate    18 /min             Lalitha Pily PTA  
Work Phone:   
[phone]                          [facility]  
   
                                                    2022   
10:                              SaO2% (BldA) [Mass   
fraction]                 98 %                      Lalitha Wolfion PTA  
Work Phone:   
[phone]                          [facility]  
   
                                                    2022   
10:                              Systolic blood   
pressure                  126 mm[Hg]                Lalitha Wolfion PTA  
Work Phone:   
[phone]                          [facility]  
   
                                                    2022   
10:          Body temperature    97.3 [degF]         Brenda   
Halderman-Gold PT  
Work Phone:   
[phone]                          [facility]  
   
                                                    2022   
10:                              Diastolic blood   
pressure                  60 mm[Hg]                 Brenda   
Halderman-Gold PT  
Work Phone:   
[phone]                          [facility]  
   
                                                    2022   
10:          Heart rate          68 /min             Brneda   
Halderman-Gold PT  
Work Phone:   
[phone]                          [facility]  
   
                                                    2022   
10:          Respiratory rate    18 /min             Brenda   
Halderman-Gold PT  
Work Phone:   
[phone]                          [facility]  
   
                                                    2022   
10:                              SaO2% (BldA) [Mass   
fraction]                 99 %                      Brenda   
Halderman-Gold PT  
Work Phone:   
[phone]                          [facility]  
   
                                                    2022   
10:                              Systolic blood   
pressure                  110 mm[Hg]                Brenda   
Halderman-Gold PT  
Work Phone:   
[phone]                          [facility]  
   
                                                    2022   
15:          Body temperature    97.9 [degF]         Brenda   
Halderman-Gold PT  
Work Phone:   
[phone]                          [facility]  
   
                                                    2022   
15:                              Diastolic blood   
pressure                  64 mm[Hg]                 Brenda   
Halderman-Gold PT  
Work Phone:   
[phone]                          [facility]  
   
                                                    2022   
15:          Heart rate          73 /min             Brenda   
Halderman-Gold PT  
Work Phone:   
[phone]                          [facility]  
   
                                                    2022   
15:          Respiratory rate    18 /min             Brendagomez Jorgederman-Gold PT  
Work Phone:   
[phone]                          [facility]  
   
                                                    2022   
15:                              SaO2% (BldA) [Mass   
fraction]                 99 %                      Brenda   
David PT  
Work Phone:   
[phone]                          [facility]  
   
                                                    2022   
15:                              Systolic blood   
pressure                  112 mm[Hg]                Brenda Hernandez PT  
Work Phone:   
[phone]                          [facility]  
   
                                                    2022   
09:          Body temperature    97.59 [degF]        Júnior Ahuja MD  
Work Phone:   
[phone]                          [facility]  
   
                                                    2022   
09:          Body weight         113.4 kg            Júnior Ahuja MD  
Work Phone:   
[phone]                          [facility]  
   
                                                    2022   
09:                              Diastolic blood   
pressure                  66 mm[Hg]                 Júnior Ahuja MD  
Work Phone:   
[phone]                          [facility]  
   
                                                    2022   
09:          Heart rate          60 /min             Júnior Ahuja MD  
Work Phone:   
[phone]                          [facility]  
   
                                                    2022   
09:          Respiratory rate    16 /min             Júnior Ahuja MD  
Work Phone:   
[phone]                          [facility]  
   
                                                    2022   
09:                              Systolic blood   
pressure                  130 mm[Hg]                Júnior Ahuja MD  
Work Phone:   
[phone]                          [facility]  
   
                                                    2022   
12:                              Diastolic blood   
pressure                  76 mm[Hg]                 Mi Nurse  
Work Phone:   
[phone]                          [facility]  
   
                                                    2022   
12:          Heart rate          62 /min             Mi Nurse  
Work Phone:   
[phone]                          [facility]  
   
                                                    2022   
12:                              Systolic blood   
pressure                  128 mm[Hg]                Mi Nurse  
Work Phone:   
[phone]                          [facility]  
  
  
  
Encounters  
  
  
                          Encounter Date Encounter Type Care Provider Facility  
   
                          Start: 2023 Telephone encounter Neelima Briones RN   
[facility]  
  
  
  
                                          
   
                                          
   
                                          
   
                                          
   
                                          
   
                                          
   
                                          
   
                                          
   
                                          
   
                                          
   
                                          
   
                                          
   
                                          
   
                                          
   
                                          
   
                                          
   
                                          
   
                                          
   
                                          
   
                                          
   
                                          
   
                                          
   
                                          
   
                                          
   
                                          
   
                                          
   
                                          
   
                                          
   
                                          
   
                                          
   
                                          
   
                                          
   
                                          
   
                                          
   
                                          
   
                                          
   
                                          
   
                                          
   
                                          
   
                                          
   
                                          
   
                                          
   
                                          
   
                                          
   
                                          
   
                                          
   
                                          
   
                                          
   
                                          
   
                                          
   
                                          
   
                                          
   
                                          
   
                                          
   
                                          
   
                                          
   
                                          
   
                                          
   
                                          
   
                                          
   
                                          
   
                                          
   
                                          
   
                                          
   
                                          
   
                                          
   
                                          
   
                                          
   
                                          
   
                                          
   
                                          
   
                                          
   
                                          
   
                                          
   
                                          
   
                                          
   
                                          
   
                                          
   
                                          
   
                                          
   
                                          
   
                                          
   
                                          
   
                                          
   
                                          
   
                                          
   
                                          
   
                                          
   
                                          
   
                                          
   
                                          
   
                                          
   
                                          
   
                                          
   
                                          
   
                                          
   
                                          
   
                                          
   
                                          
   
                                          
   
                                          
   
                                          
   
                                          
   
                                          
   
                                          
   
                                          
   
                                          
   
                                          
   
                                          
   
                                          
   
                                          
   
                                          
   
                                          
   
                                          
   
                                          
   
                                          
   
                                          
   
                                          
   
                                          
   
                                          
   
                                          
   
                                          
   
                                          
   
                                          
   
                                          
   
                                          
   
                                          
   
                                          
   
                                          
   
                                          
   
                                          
   
                                          
   
                                          
   
                                          
   
                                          
   
                                          
   
                                          
   
                                          
   
                                          
   
                                          
   
                                          
   
                                          
   
                                          
   
                                          
   
                                          
   
                                          
   
                                          
   
                                          
   
                                          
   
                                          
   
                                          
   
                                          
   
                                          
   
                                          
   
                                          
   
                                          
   
                                          
   
                                          
   
                                          
   
                                          
   
                                          
   
                                          
   
                                          
   
                                          
   
                                          
   
                                          
   
                                          
   
                                          
   
                                          
   
                                          
   
                                          
   
                                          
   
                                          
   
                                          
   
                                          
   
                                          
   
                                          
   
                                          
   
                                          
   
                                          
   
                                          
   
                                          
   
                                          
   
                                          
   
                                          
   
                                          
   
                                          
   
                                          
   
                                          
   
                                          
   
                                          
   
                                          
   
                                          
   
                                          
   
                                          
   
                                          
   
                                          
   
                                          
   
                                          
   
                                          
   
                                          
   
                                          
   
                                          
   
                                          
   
                                          
   
                                          
   
                                          
   
                                          
   
                                          
   
                                          
   
                                          
   
                                          
   
                                          
   
                                          
   
                                          
   
                                          
   
                                          
   
                                          
   
                                          
   
                                          
   
                                          
   
                                          
  
  
  
Procedures  
  
  
                          Date         Procedure    Procedure Detail Performing   
Clinician  
   
                                        Start: 10-   Mri abdomen w/o &   
w/contrast material                                 Marin Fish MD  
Work Phone:   
[phone]  
   
                          Start: 2023 Colonoscopy               [facility]  
   
                                        Start: 2023   Mri abdomen w/o &   
w/contrast material                                 Maurizio Cochran PA-C  
Work Phone:   
[phone]  
   
                                        Start: 2022   Us abdominal real ti  
me   
w/image limited                                     Tello A Tanneri DO  
Work Phone:   
[phone]  
  
  
  
Plan of Treatment  
  
  
                          Date         Care Activity Detail       Author  
   
                          Start: 2027 Urine microalbumin profile            
    [facility]  
  
  
  
                                          
   
                                          
   
                                          
   
                                          
   
                                          
   
                                          
   
                                          
   
                                          
   
                                          
   
                                          
   
                                          
   
                                          
   
                                          
   
                                          
   
                                          
   
                                          
   
                                          
   
                                          
   
                                          
   
                                          
   
                                          
   
                                          
   
                                          
   
                                          
   
                                          
   
                                          
   
                                          
   
                                          
   
                                          
   
                                          
   
                                          
   
                                          
   
                                          
   
                                          
   
                                          
   
                                          
   
                                          
   
                                          
   
                                          
   
                                          
   
                                          
   
                                          
   
                                          
   
                                          
   
                                          
   
                                          
   
                                          
   
                                          
   
                                          
   
                                          
   
                                          
   
                                          
   
                                          
   
                                          
   
                                          
   
                                          
   
                                          
   
                                          
   
                                          
   
                                          
   
                                          
   
                                          
   
                                          
   
                                          
   
                                          
   
                                          
   
                                          
   
                                          
   
                                          
   
                                          
   
                                          
   
                                          
   
                                          
   
                                          
   
                                          
   
                                          
   
                                          
   
                                          
   
                                          
   
                                          
   
                                          
   
                                          
   
                                          
   
                                          
   
                                          
   
                                          
   
                                          
   
                                          
   
                                          
   
                                          
   
                                          
   
                                          
   
                                          
   
                                          
   
                                          
   
                                          
   
                                          
   
                                          
   
                                          
   
                                          
   
                                          
   
                                          
   
                                          
   
                                          
   
                                          
   
                                          
   
                                          
   
                                          
   
                                          
   
                                          
   
                                          
   
                                          
   
                                          
   
                                          
   
                                          
   
                                          
   
                                          
   
                                          
   
                                          
   
                                          
   
                                          
   
                                          
   
                                          
   
                                          
   
                                          
   
                                          
   
                                          
   
                                          
   
                                          
   
                                          
   
                                          
   
                                          
   
                                          
   
                                          
   
                                          
   
                                          
   
                                          
   
                                          
   
                                          
   
                                          
   
                                          
   
                                          
   
                                          
   
                                          
   
                                          
   
                                          
   
                                          
   
                                          
   
                                          
   
                                          
   
                                          
   
                                          
   
                                          
   
                                          
   
                                          
   
                                          
   
                                          
   
                                          
   
                                          
   
                                          
   
                                          
   
                                          
   
                                          
   
                                          
   
                                          
   
                                          
   
                                          
   
                                          
   
                                          
   
                                          
   
                                          
  
  
  
Immunizations  
  
  
                      Immunization Date Immunization Notes      Care Provider Fa  
cili  
   
                                        10-          influenza, high dose  
   
seasonal,   
preservative-free                                   Júnior Ahuja MD  
Work Phone:   
[phone]                          [facility]  
Work Phone:   
[phone]  
   
                                        10-          influenza virus vacc  
ine,   
unspecified formulation                             [facility]  
Work Phone:   
[phone]                          [facility]  
   
                                        2021          COVID-19 vaccine, ag  
e   
12+ yr (PFIZER-BIONTECH   
- PURPLE TOP)                                       [facility]  
Work Phone:   
[phone]                          [facility]  
Work Phone:   
[phone]  
   
                                        2021          influenza (aIIV4)   
vaccine, age 65+ yr,   
quadrivalent, PF (FLUAD   
QUADRIVALENT)                                       [facility]  
Work Phone:   
[phone]                          [facility]  
Work Phone:   
[phone]  
   
                                        2021          COVID-19 vaccine, ag  
e   
12+ yr (PFIZER-BIONTECH   
- PURPLE TOP)                                       [facility]  
Work Phone:   
[phone]                          [facility]  
Work Phone:   
[phone]  
   
                                        2021          COVID-19 vaccine, ag  
e   
12+ yr (PFIZER-BIONTECH   
- PURPLE TOP)                                       [facility]  
Work Phone:   
[phone]                          [facility]  
Work Phone:   
[phone]  
   
                                        2020          influenza, high dose  
   
seasonal,   
preservative-free                                   [facility]  
Work Phone:   
[phone]                          [facility]  
Work Phone:   
[phone]  
   
                                        2019          influenza, high dose  
   
seasonal,   
preservative-free                                   [facility]  
Work Phone:   
[phone]                          [facility]  
   
                                        10-          influenza, seasonal,  
   
injectable, preservative   
free                                                [facility]  
Work Phone:   
[phone]                          [facility]  
Work Phone:   
[phone]  
   
                                        2018          zoster vaccine   
recombinant                                         [facility]  
Work Phone:   
[phone]                          [facility]  
Work Phone:   
[phone]  
   
                                        2018          influenza, high dose  
   
seasonal,   
preservative-free                                   [facility]  
Work Phone:   
[phone]                          [facility]  
   
                                        2018          zoster vaccine   
recombinant                                         [facility]  
Work Phone:   
[phone]                          [facility]  
Work Phone:   
[phone]  
   
                                        2018          pneumococcal   
polysaccharide vaccine,   
23 valent                                           Júnior Ahuja MD  
Work Phone:   
[phone]                          [facility]  
Work Phone:   
[phone]  
   
                                        2017          influenza, high dose  
   
seasonal,   
preservative-free                                   [facility]  
Work Phone:   
[phone]                          [facility]  
   
                                        2017          pneumococcal conjuga  
te   
vaccine, 13 valhunter Ahuja MD  
Work Phone:   
[phone]                          [facility]  
Work Phone:   
[phone]  
   
                                        2017          tetanus and diphther  
ia   
toxoids, adsorbed,   
preservative free, for   
adult use (5 Lf of   
tetanus toxoid and 2 Lf   
of diphtheria toxoid)                               [facility]  
Work Phone:   
[phone]                          [facility]  
   
                                        2017          tetanus toxoid, redu  
gloria   
diphtheria toxoid, and   
acellular pertussis   
vaccine, adsorbed                                   [facility]  
Work Phone:   
[phone]                          [facility]  
Work Phone:   
[phone]  
   
                                        10-          influenza, high dose  
   
seasonal,   
preservative-free                                   Mi Nurse  
Work Phone:   
[phone]                          [facility]  
   
                                        10-          pneumococcal conjuga  
te   
vaccine, 13 valhunter Ahuja MD  
Work Phone:   
[phone]                          [facility]  
Work Phone:   
[phone]  
   
                                        2015          pneumococcal conjuga  
te   
vaccine, 13 valent                                  Mi Nurse  
Work Phone:   
[phone]                          [facility]  
   
                                        2014          influenza, seasonal,  
   
injectable                                          Mi Nurse  
Work Phone:   
[phone]                          [facility]  
   
                                        2013          influenza virus vacc  
ine,   
unspecified formulation                             Mi Nurse  
Work Phone:   
[phone]                          [facility]  
   
                                        10-          influenza virus vacc  
ine,   
unspecified formulation                             Mi Nurse  
Work Phone:   
[phone]                          [facility]  
Work Phone:   
[phone]  
   
                                        2009          influenza virus vacc  
ine,   
unspecified formulation                             Mi Nurse  
Work Phone:   
[phone]                          [facility]  
Work Phone:   
[phone]  
   
                                        2007          influenza virus vacc  
ine,   
unspecified formulation                             Mi Nurse  
Work Phone:   
[phone]                          [facility]  
   
                                        2007          tetanus and diphther  
ia   
toxoids, adsorbed,   
preservative free, for   
adult use (2 Lf of   
tetanus toxoid and 2 Lf   
of diphtheria toxoid)                               Mi Nurse  
Work Phone:   
[phone]                          [facility]  
   
                                        2006          influenza virus vacc  
ine,   
unspecified formulation                             Mi Nurse  
Work Phone:   
[phone]                          [facility]  
Work Phone:   
[phone]  
   
                                        2006          pneumococcal   
polysaccharide vaccine,   
23 valent                                           Mi Nurse  
Work Phone:   
[phone]                          [facility]  
Work Phone:   
[phone]  
   
                                        11-          influenza virus vacc  
ine,   
unspecified formulation                             Mi Nurse  
Work Phone:   
[phone]                          [facility]  
Work Phone:   
[phone]  
  
  
  
Payers  
  
  
                          Date         Payer Category Payer        Policy ID  
   
                                2019      Unknown         [employer]  
 SUPPLEMENT   
[id number]   
2019-Present   
[phone] [address]   
[place] [address]   
Indemnity                               [id number]   
[id number]  
[id number]  
   
                                2019      Unknown         [employer]  
 SUPPLEMENT   
[id number]   
2019-Present   
[phone] [address]   
[place] [address]   
Indemnity                               [id number]  
[id number]  
   
                          11-   Unknown                   [id number]  
   
                          2005   Medicare                    
   
                                2005      Medicare        MEDICARE MEDICAR  
[facility]   
[id number] 2005-Present   
[phone]  [address]    
[place] [address]   
Medicare                                [id number]   
[id number]  
[id number]  
   
                          2005   Medicare                  [id number]  
   
                                       Medicare                  [id number]  
  
  
  
Social History  
  
  
                          Date         Type         Detail       Facility  
   
                                                            [facility] stat  
[facility]                      Never smoked tobacco      [facility]  
Work Phone:   
[phone]  
   
                                                    Start: 2022  
End: 2023           Alcohol intake            Current non-drinker   
of alcohol (finding)                    [facility]  
   
                                                    Start: 2022  
End: 2023                         History SDOH Alcohol   
Frequency                 1                         [facility]  
   
                                                    Start: 2022  
End: 2023                         History SDOH Social   
Connections Phone         5                         [facility]  
   
                                                    Start: 2022  
End: 2023                         History SDOH Social   
Connections Get Together  4                         [facility]  
   
                                                    Start: 2022  
End: 2023                         History SDOH Social   
Connections Mandaeism        3                         [facility]  
   
                          Start: 1940 Sex Assigned At Birth Not on file  C  
[facility]  
   
                                                    Start: 2022  
End: 2022                         Exposure to SARS-CoV-2   
(event)                   Not sure                  [facility]  
Work Phone:   
[phone]  
   
                                        Start: 2023   History SDOH Alcohol  
 Std   
Drinks                    0                         [facility]  
   
                                        Start: 2023   History SDOH Social   
Connections Get Together  2                         [facility]  
   
                                        Start: 2023   History SDOH Social   
Connections Membership    98                        [facility]  
   
                                                    Start: 2023  
End: 2023     History of Social function                     [facility]  
[facility]  
   
                                                    Start: 2023  
End: 2023                         Social connection and   
isolation panel                                     [facility]  
   
                                                            Do you belong to any  
 clubs   
or organizations such as   
[employer] groups, unions,   
fraternal or athletic   
groups, or school groups? Patient refused           [facility]  
   
                                                            Are you now ,  
   
, ,   
, never    
or living with a partner?                    [facility]  
   
                                                            How often to you hav  
e a   
drink containing alcohol? Never                     [facility]  
   
                                                            (I/We) worried wheth  
er   
(my/our) food would run   
out before (I/we) got   
money to buy more.        DK or Refused             [facility]  
   
                                                            In the past 12 month  
s, was   
there a time when you were   
not able to pay the   
mortgage or rent on time? No                        [facility]  
   
                                                            Do you belong to any  
 clubs   
or organizations such as   
[employer] groups, unions,   
fraternal or athletic   
groups, or school groups? Yes                       [facility]  
Work Phone:   
[phone]  
   
                                                            Are you now ,  
   
, ,   
, never    
or living with a partner?                    [facility]  
Work Phone:   
[phone]  
   
                                                            Do you feel stress -  
   
tense, restless, nervous,   
or anxious, or unable to   
sleep at night because   
your mind is troubled all   
the time - these days   
[OSQ]                     Not at all                [facility]  
Work Phone:   
[phone]  
  
  
  
Medical Equipment  
  
  
                                Procedure Code  Equipment Code  Equipment Origin  
al   
Text                      Equipment Identifier      Dates  
   
                                                                Felt Ptfe 1.2 Cm  
 X   
10 Cm - [id number]         [id number]                Start:   
10-  
  
  
  
                                          
   
                                          
   
                                          
  
  
  
Clinical Notes 2019 to 2023  
   Telephone Encounter - Neelima Briones RN - 2023 4:36 PM Juanito Bhagat MD - 10/19/2023 2:25 PM Margaret James RT(R) - 10/02/2023 10:00 AM 
Tello Beckwith DO - 2023 3:54 PM EDT  
  
                                Note Date & Type Note            Facility  
   
                                                    2023 Miscellaneous   
Notes                                   Formatting of this note might be   
different from the original.  
Attempted to reach the patient at   
the contact number that they   
provided [phone] (home) .   
Unable to speak with patient so   
without identifying the patient   
the following information was left   
on their voice mail: Date of   
procedure, location and report   
time  
Prep instructions  
A message was left informing the   
patient/patient representative   
they must have a responsible adult   
accompany them to their procedure;   
and remain in the endoscopy area   
until they are discharged. Failure   
to have a responsible adult   
accompany the patient to their   
procedure appointment prevents the   
use of sedation or anesthesia for   
their procedure; and can result in   
cancellation of the procedure  
NPO instructions were reviewed.  
Instructions to contact their   
primary care provider regarding   
their medications and which   
medications to stop in preparation   
for their procedure  
Instructions to completely read   
and follow the written   
instructions that they recieved   
regarding their procedure.  
Number to call with questions or   
concerns [phone]  
Number to call to cancel their   
procedure [phone]  
Neelima Briones RN  
  
Electronically signed by Neelima Briones RN at 2023 4:36 PM   
EST  
documented in this encounter            [facility]  
   
                                10- Note                 [facility]  
   
                                                    10- History of   
Present illness Narrative               Formatting of this note is   
different from the original.  
HCC Clinic New Patient  
Date of visit: 10/19/23  
  
CC: HCC treated with SBRT  
  
HPI: This is a 83 year old with   
medical history of MASH-related   
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   
complicated by HCC (within [place]   
treated with SBRT 2023), HE, PUD   
(bled ) here for follow-up  
  
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   
lesion, treated with SBRT   
(completed 2023)  
Most recent MRI 10/2 without new   
lesions and ecreased size of   
treated segment Keren lesion with   
enhancing internal septa (LR TR   
equivocal).  
  
He is doing well otherwise, no   
liver-related symptoms to report  
Has been having melenic stool for   
the last few weeks, also with some   
hematochezia. Has been requiring   
iron transfusions lately. Is more   
fatigued lately since this   
started. Patient does not want to   
pursue colonoscopy- had one in   
2023 with 1.4 cm TVA removed,   
EGD with small non-bleeding AVM   
which was cauterized  
  
Last clinic visit:  
Long-standing history of NAFLD  
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   
CÁRDENAS-related cirrhosis (given his   
risk factors) in 2022 on   
further workup of anemia. Saw   
hematology/oncology for this,   
eventually had liver imaging which   
noted a mass in 2022, recent   
MRI in April with cirrhosis along   
with a 4.1 cm HCC in seg 4A  
Case discussed at tumor board and   
he was referred for SBRT (limited   
options given age, functional   
limitations, etc)  
He met with radiation oncology   
just recently and is eager to get   
treatment started  
  
With respect to his liver-related   
symptoms:  
Sleeps a lot in the daytime, not   
so much at night. Possibly a bit   
more confusion when he talks.  
Has at least 4-5 soft bowel   
movements/day,  black as coal    
because he is taking iron   
supplements.  
No abdominal distention. Takes   
lasix for LE edema  
No jaundice, pruritis  
  
HCC history:  
Diagnosis date: 2022  
Initial AFP: 8.5  
Biopsy-proven?: no  
Imaging: MRI  
Within [place]: Yes  
CPT score: A  
ECOG performance status: 1  
  
Treatment history:  
LT candidate: no  
Resection: no  
Locoregional therapy: getting SBRT  
  
Review of Systems:  
See note  
  
Past Medical/surgical/family   
History: unchanged  
  
Social History:  
Tobacco: denies  
EtOH: denies  
Substances: denies  
  
I have confirmed and edited as   
necessary, the PFSH and ROS   
obtained by others.  
  
Current Outpatient Medications on   
File Prior to Visit  
Medication Sig  
oxybutynin ER (DITROPAN XL) 15 mg   
24 hr Extended Rel Tab Take 2   
tablets by mouth once daily.  
furosemide (LASIX) 20 mg tablet   
TAKE 1 TABLET BY MOUTH EVERY OTHER   
RDAY  
Diaper,Brief, Adult,Disposable   
(DEPEND EASY FIT UNDERGARMENTS) 1   
Each three times daily.  
ascorbic acid, vitamin C, (VITAMIN   
C) 500 mg tablet Take 1 tablet by   
mouth once daily.  
ascorbic acid-elderberry fruit   
100-50 mg chew Take 2 tablets by   
mouth once daily.  
cyanocobalamin (VITAMIN B-12)   
2,500 mcg tablet Take 2,500 mcg by   
mouth once daily.  
aspirin, enteric coated (ASPIRIN,   
ENTERIC COATED) 81 mg EC tablet   
Take 81 mg by mouth once daily.  
albuterol HFA (PROVENTIL HFA,   
VENTOLIN HFA) 90 mcg/actuation   
inhaler Inhale 2 Puffs as   
instructed every 4 hours as needed   
for wheezing/shortness of breath.  
omeprazole (PRILOSEC) 40 mg   
capsule Take 1 capsule by mouth   
once daily.  
allopurinol (ZYLOPRIM) 300 mg   
tablet Take 1 tablet by mouth once   
daily.  
magnesium oxide 400 mg magnesium   
cap Take 1 capsule by mouth once   
daily.  
fenofibrate nanocrystallized   
(TRICOR) 48 mg tablet Take 1   
tablet by mouth once daily.  
sertraline (ZOLOFT) 50 mg tablet   
Take 1 tablet by mouth once daily.  
nitroglycerin sublingual   
(NITROQUICK) 0.4 mg SL tablet   
Dissolve 1 tablet under the tongue   
every 5 minutes as needed for   
chest pain. FOR CHEST PAIN. IF NO   
RELIEF CALL 911  
fluticasone (FLONASE) 50   
mcg/actuation nasal spray Use 1   
Spray in each nostril once daily.  
ferrous sulfate 325 mg (65 mg   
iron) EC tablet Take 1 tablet by   
mouth twice daily with meals.   
(Patient taking differently: Take   
325 mg by mouth once daily.)  
fluticasone-vilanterol (BREO   
ELLIPTA) 200-25 mcg/dose inhaler   
Inhale 1 Inhalation as instructed   
once daily. Inhale one puff once   
daily. DO NOT CLICK OPEN UNTIL   
READY FOR DOSE  
multivitamin tablet Take 1 tablet   
by mouth once daily.  
torsemide (DEMADEX) 20 mg tablet   
Take 2 tablets by mouth once daily   
for 5 days.  
melatonin 10 mg tab Take 1 tablet   
by mouth as needed.  
amoxicillin (POLYMOX, AMOXIL) 500   
mg capsule Take four tablets, one   
hour prior to dental work (Patient   
not taking: Reported on 2023)  
  
No current facility-administered   
medications on file prior to   
visit.  
  
Vitals:  
/89[Pt denies headaches   
nausea and dizziness[   Pulse   
129[Afib[   Temp (Src) 97.8   
(Temporal)   Ht 5' 10  (1.78m)     
Wt 246 lb 9.6 oz (111.9kg)   SpO2   
98%   BMI 35.38 kg/(m^2).  
  
  
Physical Exam:  
Well appearing, in NAD  
No jaundice  
Aox3  
In a wheelchair  
  
Laboratory:  
MELD 3.0: 7 at 2023 10:07 AM  
MELD-Na: 8 at 2023 10:07 AM  
Calculated from:  
Serum Creatinine: 1.05 mg/dL at   
2023 10:07 AM  
Serum Sodium: 139 mmol/L (Using   
max of 137 mmol/L) at 2023   
10:07 AM  
Total Bilirubin: 0.5 mg/dL (Using   
min of 1 mg/dL) at 2023 10:07   
AM  
Serum Albumin: 3.5 g/dL at   
2023 10:07 AM  
INR(ratio): 1.1 at 2023 10:07   
AM  
Age at listing (hypothetical): 83   
years  
Sex: Male at 2023 10:07 AM  
  
Disclaimer: The MELD Calculator   
cannot calculate MELD scores for   
patients on dialysis.  
  
AFP  
Lab Results  
Component Value Date  
AFP <3.0 10/02/2023  
  
Imaging:  
MRI Liver 2022:  
Lesion#: 1  
Location: Segment Keren; (series 15   
image 19)  
Size (maximum long-axis   
dimension): 4.1 cm, Prior size:   
N/A  
Arterial Phase Hyperenhancement   
(APHE): Nonrim APHE  
Wash-out: Yes - non-peripheral  
Enhancing Capsule: Yes  
Threshold growth (50% or more   
growth in 6 months or less): N/A  
Ancillary features favoring   
malignancy or HCC: Fat in mass   
more than  
adjacent liver  
Ancillary features favoring   
benignity: N/A  
LI-RADS Category: LR-5 (definitely   
HCC)  
OPTN Class 5 Category:5B  
  
MRI Liver 10/2023  
  
Decreased size of treated segment   
Keren lesion with enhancing internal  
septa (LR TR equivocal).  
  
No new hepatic lesion that meets   
LR 5/OPTN criteria for   
hepatocellular  
carcinoma.  
  
Cirrhotic liver morphology with   
findings of portal hypertension,  
unchanged.  
  
Endoscopy:  
EGD 2023:  
Findings:  
A 5 cm hiatal hernia was present.  
A single 6 mm angiodysplastic   
lesion with no bleeding was found   
on  
the lesser curvature of the   
stomach. Fulguration to ablate the   
lesion  
by argon plasma at 1.3   
liters/minute and 35 flores was   
successful.  
Diffuse moderate inflammation   
characterized by erythema was   
found in  
the gastric body and in the   
gastric antrum. Biopsies were   
taken with  
a cold forceps for histology.  
Multiple 7 mm sessile polyps with   
no bleeding and no stigmata of  
recent bleeding were found in the   
stomach. This was biopsied with a  
cold forceps for histology.  
The first portion of the duodenum   
and second portion of the duodenum  
were normal.  
  
Assessment and plan:  
This is a 83 year old with medical   
history of MASH-related cirrhosis   
(MELD 3.0: 7) complicated by HCC   
(within [place] treated with SBRT   
2023), HE, PUD (bled ) here   
for follow-up  
Doing well from an HCC and   
liver-related standpoint without   
evidence of decompensation  
Has been having melenic stool   
lately. Discussed need for repeat   
EGD and colonoscopy but patient   
wants to defer repeat colonoscopy   
indefinitely  
Will send for EGD  
Repeat MRI 3 months  
  
RTC 3-4 months  
  
I spent a total of 45 minutes on   
the date of the service which   
included preparing to see the   
patient, face-to-face patient   
care, completing clinical   
documentation, obtaining and/or   
reviewing separately obtained   
history, performing a medically   
appropriate examination,   
counseling and educating the   
patient/family/caregiver, ordering   
medications, tests, or procedures,   
and communicating with other HCPs   
(not separately reported).  
  
Juanito Esparza MD  
Associate Staff, Department of   
Gastroenterology and Hepatology  
Dig[facility]ive Disease and Surgery   
[place]  
Electronically signed by Juanito Esparza MD at 10/19/2023 3:43   
PM EDT  
documented in this encounter            [facility]  
   
                                10- Note                 [facility]  
   
                                10- Note                 [facility]  
   
                                                    10- History of   
Present illness Narrative               Formatting of this note is   
different from the original.  
Radiology Service Progress Note  
  
DATE OF SERVICE: 2023  
TIME: 10:47 AM  
  
PATIENT IDENTITY VERIFICATION   
COMPLETED USING TWO (2) STANDARD   
IDENTIFIERS: Name and Date of   
Birth confirmed by patient   
verbally.  
FALL SCREENING: Has the patient   
had 2 falls in the last year or 1   
fall with injury or currently   
using an Ambulatory Assistive   
Device (Walker, Cane, Wheelchair,   
Crutches, etc.)? Yes, Patient High   
Risk for Falls  
What interventions were put in   
place to prevent falls during this   
visit? Instructed Patient to Call   
for Help if Needed, Offered   
Assistance with   
Transfers/Clothing, Instructed   
Patient to Remain Seated (Not on   
Exam Table) Until Exam, and   
Increased Observations by   
Caregivers  
PATIENT GENDER DATA: Male  
PATIENT RELEVANT IMPLANT DATA   
REVIEWED: Yes  
ALLERGIES: Reviewed and unchanged  
CONTRAST ALLERGY: NO.  
  
EXAM: MRI - CONTRAST TYPE: GROUP   
II  
  
PERIPHERAL IV DATA: Ambulatory: A   
peripheral IV was started in the   
Right antecubital site with a   
Angio cath: 22 gauge.  
RADIOLOGY DEPARTMENT: MR; Exam(s)   
Completed: Body: Liver (routine)  
  
SIGNATURE: RT Larisa(R)   
PATIENT NAME: Mitesh Braun  
DATE: 2023 MRN:   
[id number]  
TIME: 10:47 AM  
  
Electronically signed by Margaret Reid RT(R) at 10/02/2023 10:47 AM   
EDT  
documented in this encounter            [facility]  
   
                                                    2023 Miscellaneous   
Notes                                   Formatting of this note might be   
different from the original.  
Referral form has been faxed to   
[facility] at number given below. Call to   
Dixie on [employer] and left vague   
message that referral form has   
been faxed to [facility]. Also called   
work number listed and left   
message as well notifying her with   
vague message.  
  
Carlee Monge Ma  
  
Electronically signed by Carlee Monge Ma at 2023 9:38 AM   
EDT  
Formatting of this note might be   
different from the original.  
Please place referral, fax to   
[phone]. [facility] called and they   
have not received anything and pt   
is calling them. Also please call   
pt once referral has been faxed.   
Pt concerned because he needs to   
be seen asap.  
  
Chantel Aldana LPN  
  
Electronically signed by Chantel Aldana LPN at 2023 9:23   
AM EDT  
Formatting of this note might be   
different from the original.  
Please fax referral  
Krista Perez APRN.CNP  
Electronically signed by Krista Perez APRN.CNP at 2023 9:12   
AM EDT  
Formatting of this note might be   
different from the original.  
Patient calls to check on status   
of request below. Patient reports   
wound is on left calf. Open area   
is the size of a quarter with   
yellowish green drainage. Redness   
around the wound is also about the   
width of another quarter. Patient   
not sure if wound is warm to   
touch. Denies fever.  
Declines appointment for eval with   
[facility].  
Patient wants to go straight to   
wound center.  
  
Melva Khalil RN  
  
Electronically signed by   
Melva Khalil RN at   
2023 10:09 AM EDT  
Formatting of this note might be   
different from the original.  
Pt's daughter Dixie calling back   
to report wound is on pt's calf,   
she is not sure which calf, states   
it is about nickel to quarter   
size. Started Tues - 2 days ago.   
Has yellow drainage with redness   
around the opening. Denies fever.  
  
Please advise if referral will be   
made to the wound center.  
  
María Chance LPN  
  
Electronically signed by María Chance LPN at 2023 8:50 AM EDT  
Formatting of this note might be   
different from the original.  
Left message for JUMA Colin Nurse   
to call with more info.  
Helen Zollinger LPN  
  
Electronically signed by   
Zollinger, Helen E LPN at   
2023 4:19 PM EDT  
Formatting of this note might be   
different from the original.  
Please call Dixie from [facility] for more information on   
this wound: location, etc.  
Krista Perez APRN.CNP  
Electronically signed by Krista Perez APRN.CNP at 2023 2:16   
PM EDT  
Formatting of this note might be   
different from the original.  
Cece with [facility]   
calls to report that Dixie with   
[facility] called for appt   
for pt to be seen for yellow   
drainage from wound. Cece reports   
that pcp has to send order.  
  
Fax order to [facility]:   
[phone].  
  
Yi Last LPN  
  
Electronically signed by   
Yi Last LPN at   
2023 1:56 PM EDT  
documented in this encounter            [facility]  
   
                                                    2023 Miscellaneous   
Notes                                   Formatting of this note might be   
different from the original.  
Delfino spoke with daughter Dixie.   
Discussed patient currently   
receives 30 hour a week [facility]   
[facility] Patient also   
receives medical alert button   
court[facility] of the [facility].  
Dixie notes that she is concerned   
regarding patient falls. Patient   
is adamant that he is not   
interested in going in to an AL.   
Dixie and Delfino discussed that the [facility]   
nurse that visits patient noted   
patient was not to the point of   
needing a guardian.  
Dixie and Delfino also discussed   
caregiver support groups to help   
support her with taking care of   
patient.  
Dixie and Delfino also discussed [facility]   
[facility] being an agency if patient   
is ever open to going to AL   
looking at options.  
Discussed also that patient can   
make choice to stay at home as he   
so chooses. Dixie notes that she   
understands that, but it is   
difficult with being concerned   
about number of falls.  
Delfino also noted that there is APS   
through [facility] for older adults if   
and when they should proceed to   
inability to care for self and   
have cognitive decline issues. Delfino   
is aware that patient is not to   
that point. Just wanting to   
educate daughter on services   
available in the community.  
Dixie notes that she will reach   
out to [facility] and see   
if they have any other service and   
supports that they could provide   
patient. Patient already receives   
[facility].   
This would just be for any extra   
service ideas such as support   
daughter as caregiver.  
Dixie thanked Delfino for community   
social service information.  
Electronically signed by   
Janine Yanez MSW at 2023   
1:08 PM EDT  
Formatting of this note might be   
different from the original.  
Delfino received message back from   
patient daughter requesting call   
back. Delfino called daughter back and   
left message that she will reach   
out and call daughter Dixie today   
@1pm.  
Electronically signed by   
Janine Yanez MSW at 2023   
9:33 AM EDT  
Formatting of this note might be   
different from the original.  
Sw left message for daughter Dixie   
to return Sw call to discuss   
community resources for patient   
care needs.  
Electronically signed by   
Janine Yanez MSW at 2023   
9:28 AM EDT  
Formatting of this note might be   
different from the original.  
Refer to MSW for options for more   
home care or support.  
Electronically signed by   
Júnior Ahuja MD at   
2023 11:18 PM EDT  
Formatting of this note might be   
different from the original.  
Pt's daughter calls to report that   
pt is falling more and forgetful   
more. Daughter reports pt fell   
three times in eight days and   
needed help to get up. Daughter   
reports pt does not want daughter   
to come to [facility] anymore because   
 it's none of her business .   
Daughter is concerned pt is not   
being truthful to dr. Daughter has   
tried to discuss Assisted Living   
with pt but pt is adamant that he   
is not leaving his house. Daughter   
reports she does have a caretaker   
come to pt's home for five hours   
six days a week but it is getting   
to the point where someone needs   
to be there more.  
Daughter is asking if pcp has any   
recommendations on what to do.  
Daughter is aware that pcp is out   
of the office this week.  
  
Yi Last LPN  
  
Electronically signed by   
Yi Last LPN at   
2023 1:27 PM EDT  
documented in this encounter            [facility]  
   
                                                    2023 Miscellaneous   
Notes                                   Formatting of this note might be   
different from the original.  
Detailed VM left on pt's daughter   
identified voicemail of   
information below.  
  
Chantel Aldana LPN  
  
  
Electronically signed by Chantel Aldana LPN at 2023 8:49   
AM EDT  
Formatting of this note is   
different from the original.  
Patient's request for medication   
is as follows  
Requested Prescriptions  
  
Signed Prescriptions Disp Refills  
dilTIAZem CD (CARDIZEM CD, CARTIA   
XT) 120 mg 24 hr capsule 90   
capsule 1  
Sig: Take 1 capsule by mouth once   
daily.  
Authorizing Provider: JÚNIOR AHUJA  
metoprolol tartrate, short acting,   
(LOPRESSOR) 25 mg tablet 180   
tablet 1  
Sig: Take 1 tablet by mouth twice   
daily.  
Authorizing Provider: JÚNIOR AHUJA  
  
Order entered - please phone   
pharmacy and notify patient.  
  
Júnior Ahuja MD  
Electronically signed by   
Júnior Ahuja MD at   
2023 10:18 AM EDT  
Formatting of this note is   
different from the original.  
Patient has been identified by   
name and date of birth: Yes,   
Provider Date Time  
Daughter phones for refill(s):  
Requested Prescriptions  
  
Pending Prescriptions Disp Refills  
dilTIAZem CD (CARDIZEM CD, CARTIA   
XT) 120 mg 24 hr capsule  
Sig: Take 1 capsule by mouth once   
daily.  
metoprolol tartrate, short acting,   
(LOPRESSOR) 25 mg tablet  
Sig: Take 1 tablet by mouth twice   
daily.  
  
Patient's daughter calling for   
refills. Says patient was in ER   
yesterday for his heart rate. She   
states he stopped taking   
medication due to no refills.  
She does not know if these were   
prescribed by PCP or [facility].  
  
Date of last office visit with   
pcp: 23  
Date of last office visit in   
primary care:  
Last 2 Encounter Wt Readings:  
Date: Wt:  
2023 103.4 kg (228 lb)  
2023 103.9 kg (229 lb)  
Previous labs/tests for   
medication: Blood Pressure:  
BUN (mg/dL)  
Date Value  
2023 27  
2022 28  
  
Sodium (mmol/L)  
Date Value  
2023 139  
2022 139  
Last 1 Encounter BP Readings:  
Date: BP:  
2023 102/60  
Please advise. Thank you. Lolis Cervantes, RN 22  
Electronically signed by Lolis Cervantes RN at 2023 3:13 PM   
EDT  
documented in this encounter            [facility]  
   
                                2023 Note                 [facility]  
   
                                                    2023 History of   
Present illness Narrative               Formatting of this note is   
different from the original.  
Hematologic problem(s):  
1) IgG kappa monoclonal gammopathy  
2) BREEZY.  
3) Liver mass.  
4) Hypercalcemia.  
5) Splenomegaly.  
6) Cirrhosis.  
  
HPI: The patient is an 83 yo male   
with a PMH significant for HTN,   
polycystic kidney disease (seeing   
nephrology for ~15 years), HLD,   
aortic repair (bovine valve), CAD,   
CAREN (CPAP), asthma, hypercalcemia,   
splenomegaly (noted on CT chest   
2014; spleen up to 18.4 cm in AP   
dimension; not enlarged on CT   
enterography ) and obesity.  
  
He had lab work ordered at his   
nephrologist office. Protein   
electrophoresis of the urine   
demonstrated no evidence of   
monoclonal spike. Urine protein   
creatinine ratio was elevated.   
Chemistry panel showed a   
creatinine of 1.33 mg/dL. Calcium   
mildly elevated 10.6 mg/dL.   
Vitamin D39.7 PTH 93.4 CBC   
significant for a total white   
count of 4900. No differential   
performed. Hemoglobin 9.0 g/dL.   
. Platelet count 79,000.   
Protein electrophoresis of the   
serum revealed a M spike but no   
immunofixation was performed.  
  
Chronic sensory neuropathy of the   
feet and left hand x10 years.   
Worse over time. He's never been   
given an answer as to why.  
  
Balance is off and he uses wheeled   
walker.  
  
L[place] alone [place]. Has   
home health aides for cleaning and   
washing clothes [facility].   
Daughter lives close by and he   
gets meals on wheels.  
  
Was having left posterior hip pain   
and got relief with Absorsene Jr.  
  
----------------------------------  
-  
  
He received 10 doses of iron   
sucrose.  
  
Presents for ongoing hematologic   
management.  
  
Interim history:  
He offers no complaints today.  
  
Diagnosed radiographic HCC per MRI   
obtained 2023 showing a 4.1   
cm tumor in segment 4A. Systemic   
staging, labs for classification   
(child Coffey, MELD) pending.   
Tentatively intermediate stage per   
BCLC, cT2 N0 MX stage II per AJCC.  
  
COURSE: definitive and SBRT   
(stereotactic body radiotherapy)  
AREA TREATED: Segment 4a liver   
HCC.  
Current dose: 3000 cGy in 1 fx  
Planned dose: 3000 cGy in 1 fx  
  
Had colonoscopy and EGD in 2023. A tubulovillous adenoma was   
removed from the sigmoid colon. On   
EGD biopsies of the stomach mucosa   
demonstrated gastric antral type   
mucosa with mild chronic inactive   
gastritis and reactive   
gastropathy. Fragments of gastric   
antral type mucosa with mild   
chronic inactive gastritis and   
reactive gastropathy. IHC for H.   
pylori was negative.  
  
Was found to be in atrial   
fibrillation when home nurse from   
[facility] picked up irregular heart beat   
on pulse ox check. No symptoms.   
Was taken to [facility] ED. he was   
advised to increase low-dose   
aspirin to 3 times daily--but I   
cannot find documentation of this   
after careful review of the   
[facility]   
electronic medical record. He is   
not on anticoagulation because of   
thrombocytopenia and cirrhosis.  
  
Back in ED yesterday at the   
request of the [facility] due to   
variable heart rate. Was told in   
ED  in and out  of a fib.  
  
Sensory neuropathy symptoms   
subjectively stable.  
Previously seen by neurology group   
in [place].  
Was told nothing to be done for   
neuropathy.  
  
He has not had any black stools.   
He says occasionally he has some   
bleeding from hemorrhoids.  
  
PHYSICAL EXAM:  
Vitals: Blood pressure 108/63,   
pulse 70, temperature 36.6 C (97.9   
F), height 171 cm (5' 7.32 ),   
weight 109.8 kg (242 lb), SpO2 100   
%.  
Fatigued-appearing and in no acute   
distress.  
EYES: Sclerae are anicteric   
bilaterally.  
LYMPHATIC: There is no palpable   
cervical or supraclavicular   
adenopathy.  
RESPIRATORY: Inspiratory breath   
sounds are of normal intensity in   
all fields.  
CARDIOVASCULAR: Rhythm is regular   
today.  
ABDOMEN: The abdomen is obese but   
nondistended.  
Extremities: Bilateral lower   
extremity swelling. Right somewhat   
worse than left. Overlying   
pitting. Stable.  
SKIN: No jaundice.  
  
LABS:  
Component Latest Ref Rng & Units   
2022  
WBC 3.70 - 11.00 k/uL 5.22 4.65   
3.87  
RBC 4.20 - 6.00 m/uL 3.27 (L) 3.73   
(L) 3.63 (L)  
Hemoglobin 13.0 - 17.0 g/dL 10.9   
(L) 11.1 (L) 11.8 (L)  
Hematocrit 39.0 - 51.0 % 34.6 (L)   
36.0 (L) 36.2 (L)  
MCV 80.0 - 100.0 fL 105.8 (H) 96.5   
99.7  
MCH 26.0 - 34.0 pg 33.3 29.8 32.5  
MCHC 30.5 - 36.0 g/dL 31.5 30.8   
32.6  
RDW-CV 11.5 - 15.0 % 15.0 16.9 (H)   
20.0 (H)  
Platelet Count 150 - 400 k/uL 69   
(L) 98 (L) 66 (L)  
MPV 9.0 - 12.7 fL 12.2 11.4 9.8  
Neut% % 78.9 79.1  
Abs Neut (ANC) 1.45 - 7.50 k/uL   
3.67 3.06  
Lymph% % 10.8 11.1  
Abs Lymph 1.00 - 4.00 k/uL 0.50   
(L) 0.43 (L)  
Mono% % 5.6 6.2  
Abs Mono <0.87 k/uL 0.26 0.24  
Eosin% % 3.4 2.8  
Abs Eosin <0.46 k/uL 0.16 0.11  
Baso% % 0.9 0.5  
Abs Baso <0.11 k/uL 0.04 <0.03  
Immature Gran % % 0.4 0.3  
IMMATURE GRANS (ABS) <0.10 k/uL   
<0.03 <0.03  
NRBC /100 WBC 0.0 0.0  
Absolute nRBC <0.01 k/uL <0.01   
<0.01 <0.01  
DTYPE Auto Auto  
Iron 41 - 186 ug/dL 30 (L) 46  
TIBC 232 - 386 ug/dL 370 360  
Transferrin Saturation 15.0 - 57.0   
% 8.1 (L) 12.8 (L)  
Ferritin 30.3 - 565.7 ng/mL 24.8   
(L) 50.4  
  
Component Latest Ref Rng & Units   
2021  
Protein, Total 6.3 - 8.0 g/dL 6.4   
5.8 (L) 6.6  
Albumin 3.9 - 4.9 g/dL 3.8 (L) 3.5   
(L) 3.8 (L)  
Calcium 8.5 - 10.2 mg/dL 10.6 (H)   
10.8 (H) 10.8 (H)  
Bilirubin, Total 0.2 - 1.3 mg/dL   
0.5 0.4 0.5  
Alkaline Phosphatase 38 - 113 U/L   
62 85 111  
AST 14 - 40 U/L 31 30 35  
Glucose 74 - 99 mg/dL 103 (H) 108   
(H) 98  
BUN 9 - 24 mg/dL 26 (H) 33 (H) 27   
(H)  
Creatinine 0.73 - 1.22 mg/dL 1.16   
1.31 (H) 1.01  
Sodium 136 - 144 mmol/L 141 140   
135 (L)  
Potassium 3.7 - 5.1 mmol/L 4.8 5.5   
(H) 4.0  
Chloride 97 - 105 mmol/L 109 (H)   
108 (H) 103  
CO2 22 - 30 mmol/L 23 25 25  
Anion Gap 9 - 18 mmol/L 9 7 (L) 7   
(L)  
ALT 10 - 54 U/L 19 21 19  
eGFR-African American >60  
eGFR-All Other Races . >60  
eGFR >=60 mL/min/1.73m 54 (L) 74  
  
Component Latest Ref Rng & Units   
2022  
Cold Agglut, 4 degrees C <1:32   
Dilutions <1:32  
Cold Agglut, 37 degrees C   
Dilutions Test not indicated when   
titer is <1:32 at 4 degrees C.  
  
Component Latest Ref Rng & Units   
2022  
PT Sec <13.1 sec 10.9  
PT INR 0.9 - 1.3 1.1  
Pathologist Interpretation, CBCDIF   
Normocytic anemia with slight   
polychromasia . . .  
Pathologist (PASHA) Reviewed by   
Savanah Cole M.D., Ph.D  
APTT 23.0 - 32.4 sec 26.2  
Fibrinogen Ag 149 - 353 mg/dL 353  
  
Component Latest Ref Rng & Units   
2022  
IgG 700 - 1,600 mg/dL 1,120  
IgA 70 - 400 mg/dL 346  
IgM 40 - 230 mg/dL 79  
  
Component Latest Ref Rng & Units   
2022  
Albumin 3.43 - 5.41 g/dL 3.62  
Alpha 1 Globulin 0.18 - 0.43 g/dL   
0.32  
Alpha 2 Globulin 0.42 - 0.98 g/dL   
0.66  
Beta Globulin 0.61 - 1.17 g/dL   
0.87  
Gamma Globulin 0.53 - 1.51 g/dL   
1.12  
Interpretation (Prot Electro) No   
definitive M protein is identified   
on protein electrophoresis. An M   
protein is identified on protein   
electrophoresis. (A)  
Interpretation Comment for Protein   
Electrophoresis See separate   
immunofixation report for   
characterization of monoclonal   
gammopathy. . . .  
M-Protein Location Gamma Fraction   
1  
M-Protein Concentration <=0.00   
g/dL 0.30 (H)  
SPE Staff Review Reviewed by Sanaz Parrish MD  
  
Component Latest Ref Rng & Units   
2022  
Kappa Free, Serum 3.3 - 19.4 mg/L   
65.0 (H)  
Lambda Free, Serum 5.7 - 26.3 mg/L   
37.1 (H)  
K/L Ratio, Serum 0.26 - 1.65 1.75   
(H)  
  
IgG kappa on immunofixation of   
serum.  
  
Component Latest Ref Rng & Units   
2023  
PTH, Intact 15 - 65 pg/mL 69 (H)  
  
PATHOLOGY:  
Bone marrow biopsy done at [facility] 2023:  
Hypercellular bone marrow with   
trilineage hematopoiesis.  
No evidence of lymphoproliferative   
disorder or plasma cell dyscrasia.  
  
Flow cytometry did not reveal   
evidence of lymphoproliferative   
disorder. There was no monoclonal   
plasma cell neoplasm.  
  
Plasma cells reported at 2% on   
aspirate.  
Iron was reported as rare   
stainable iron.  
  
Fluorescence in situ hybridization   
for BCR ABL was negative.  
  
46 XY [20].  
  
IMAGING:  
US 2022:  
IMPRESSION:  
1. There is a new hyperechoic   
nodule in the right lobe of the   
liver  
which could be further evaluated   
by MRI  
2. Nodular contour of the liver   
which may be due to cirrhosis  
3. Splenomegaly  
  
Bone survey 2022:  
IMPRESSION:  
SEVERAL SUBCENTIMETER LUCENT AREAS   
IN THE CALVARIUM.  
  
DIFFUSE OSTEOPENIA.  
  
DEGENERATIVE CHANGES AS DESCRIBED.   
POSTOPERATIVE CHANGES AS   
DESCRIBED.  
  
NO ADDITIONAL FOCAL LYTIC OR   
BLASTIC ABNORMALITY IS   
APPRECIATED.  
  
ASSESSMENT/PLAN:  
(D47.2) MGUS (monoclonal   
gammopathy of unknown   
significance) (primary encounter   
diagnosis)  
(D47.2) IgG monoclonal gammopathy  
Assessment:  
-The patient is an 83-year-old   
male with a past medical history   
as outlined above. He has no   
history of diabetes but has   
longstanding worsening sensory   
neuropathy of the feet and left   
hand. He has polycystic kidney   
disease as well as chronic mild   
hypercalcemia with elevation of   
PTH and stable serum creatinine   
over time. Referred for possible   
serum monoclonal antibody on   
electrophoresis of the serum.   
Urine negative for monoclonal   
protein on electrophoresis.  
-Low-level IgG kappa monoclonal   
gammopathy.  
-Previous hypercalcemia secondary   
to hyperparathyroidism.  
-Bone survey revealed no lytic   
lesions.  
-Previously reviewed the results   
of the bone marrow biopsy in   
detail with the patient and his   
daughter. No evidence of   
smoldering myeloma or multiple   
myeloma.  
Plan:  
-Reassess in 6 months.  
  
(D50.0) Iron deficiency anemia due   
to chronic blood loss  
(D69.6) Thrombocytopenia (HCC)  
Assessment:  
-Responded very well to a course   
of parenteral iron.  
-No overt signs of GI bleeding.  
-He was not put on anticoagulation   
for atrial fibrillation due to   
thrombocytopenia.  
-Iron saturation low again.   
Ferritin decreasing.  
Plan:  
-Check B12, serum folate and TSH.  
-5 doses of iron sucrose.  
-Reassess 4 to 6 weeks after   
completing iron.  
  
(E21.3) Hyperparathyroidism (HCC)  
Assessment:  
-Serum PTH level elevated.  
Plan:  
-Defer management to PCP.  
  
(K74.69) Other cirrhosis of liver   
(HCC)  
Assessment:  
-Established with hepatology.  
-Diagnosed with HCC--received SBRT  
Plan:  
-Follow up with hepatology.  
  
Portions of this documentation   
were copied and pasted from   
previous office visit notes in   
order to provide a cohesive   
continuity of the history. The   
note has been reviewed and edited   
and updated as necessary.  
  
I spent a total of 30 minutes on   
the date of the service which   
included preparing to see the   
patient, face-to-face patient   
care, completing clinical   
documentation, obtaining and/or   
reviewing separately obtained   
history, performing a medically   
appropriate examination,   
counseling and educating the   
patient/family/caregiver, ordering   
medications, tests, or procedures,   
communicating with other HCPs (not   
separately reported), and   
communicating results to the   
patient/family/caregiver.  
  
Tello Ortega DO  
Electronically signed by Tello Ortega DO at 2023 4:34 PM   
EDT  
documented in this encounter            [facility]  
   
                                                    08- Miscellaneous   
Notes                                   Formatting of this note might be   
different from the original.  
Noted.  
Electronically signed by   
Júnior Ahuja MD at   
08/15/2023 12:31 PM EDT  
Formatting of this note might be   
different from the original.  
Ngoc TOLBERT with [facility] calls to let   
provider know that patient HR was   
out side of their parameters   
today.  and then 44. At end   
of session it was 68.   
Asymptomatic.  
  
Dr. Dotson's office also notified.   
Patient missed his appointment   
with Dr. Dotson last week and will   
reschedule.  
  
Melva Khalil RN  
  
Electronically signed by   
Melva Khalil RN at   
2023 11:44 AM EDT  
documented in this encounter            [facility]  
   
                                2023 Note                 [facility]  
   
                                                    2023 Miscellaneous   
Notes                                   Formatting of this note might be   
different from the original.  
José Antonio PT calling from [facility] to   
report plan of care for patient   
and physical therapy will visit   
patient 2 times a week for 2 weeks   
and 1 time a week for 1 week.   
Physical therapy will work with   
patient on functional mobility. No   
call back needed.  
  
Angela Pham RN  
  
Electronically signed by Angela Pham RN at 2023 3:57   
PM EDT  
documented in this encounter            [facility]  
   
                                                    2023 Miscellaneous   
Notes                                   Formatting of this note might be   
different from the original.  
Sanaz notified.  
Electronically signed by Julia Shaffer Ma at 2023 3:58 PM EDT  
Formatting of this note might be   
different from the original.  
PCP agrees and will follow  
ALEXIA Roy.CNP  
Electronically signed by Krista Perez APRN.CNP at 2023 3:55   
PM EDT  
Formatting of this note might be   
different from the original.  
Sanaz MELTON calling from [facility] to   
report plan of care for patient   
and SN will visit patient 1 times   
a week for five weeks. SN will   
work with patient on monitoring   
and education of new diagnosis of   
Afib.  
  
Sanaz needs verbal order for POC.   
After receives verbal order 485   
form will be completed and faxed   
to provider at [phone].  
  
Please call verbal order to [facility]   
at [phone].  
  
Melva Khalil RN  
Electronically signed by   
Melva Khalil RN at   
2023 3:41 PM EDT  
documented in this encounter            [facility]  
   
                                                    2023 Miscellaneous   
Notes                                   Formatting of this note might be   
different from the original.  
Below response left on identified   
vm.  
Helen Zollinger LPN  
  
Electronically signed by   
Zollinger, Helen E LPN at   
2023 1:00 PM EDT  
Formatting of this note might be   
different from the original.  
I will follow HH.  
Electronically signed by   
Júnior Ahuja MD at   
2023 12:42 PM EDT  
Formatting of this note might be   
different from the original.  
Kimmy with [facility] calling. Patient   
discharging from [facility], diagnosis   
A-Fib, post-fall. Pt has orders   
for HH Nursing, PT and OT and   
asking if PCP willing to follow   
for these orders? They would like   
to start seeing patient tomorrow,   
on .  
  
Please call Kimmy back with   
approval at [phone].  
  
Lenore Lee RN  
  
Electronically signed by Lenore Lee RN at 2023 10:00 AM   
EDT  
documented in this encounter            [facility]  
   
                                                    2023 Miscellaneous   
Notes                                   Formatting of this note might be   
different from the original.  
Called and left a detailed   
voicemail notifying patient's   
daughter of providers message.   
Hospital phone number was left in   
case she had any questions.  
  
Nathalia Peterson RN  
  
Electronically signed by Nathalia Peterson RN at 2023 8:26 AM   
EDT  
Formatting of this note might be   
different from the original.  
Discuss at upcoming appointment   
later this month and for face to   
face requirement.  
Electronically signed by Júnior Ahuja MD at 2023 11:41   
PM EDT  
Formatting of this note might be   
different from the original.  
Dixie, pt's daughter called and   
would like to get an order for OT   
home health. Pt's mobility is   
decreasing. Daughter will check to   
see which Home Health Agency is   
covered by pt's insurance.. She   
will call back.  
  
Chantel Aldana LPN  
  
Electronically signed by Chantel Aldana LPN at 2023 4:35   
PM EDT  
documented in this encounter            [facility]  
   
                                                    2023 Miscellaneous   
Notes                                   Formatting of this note might be   
different from the original.  
Appeal letter for xifaxan faxed to   
[employer] appeal department for   
review.  
Electronically signed by Neno Bingham RN at 2023 1:03 PM   
EDT  
documented in this encounter            [facility]  
   
                                                    2023 Miscellaneous   
Notes                                   Formatting of this note might be   
different from the original.  
  
  
Spoke with patient: Yes  
Confirmed date scheduled and   
patient report time: Yes  
Procedure Planned:Colonoscopy with   
or without biopsies based on   
clinical findings  
Esophagogastroduodenoscopy(EGD)   
with or without biopies based on   
clinical findings, removal of   
polyps or lesions  
Is the patient on blood   
thinners?no  
Procedure Instructions given to   
patient: Yes, and they verbalized   
their understanding of   
instructions given  
Patient instructed to take   
prescribed preparation prior to   
procedure:Yes, and they verbalized   
their understanding of   
instructions given  
Patient instructed to have   
family/friend present for   
procedure transport   
home:Patient/patient   
representative was told that if   
they do not have a responsible   
adult accompany them to their   
procedure; and remain in the   
endoscopy area until they are   
discharged; that their procedure   
cannot be done with sedation or   
anesthesia and may be cancelled.   
and They verbalized their   
understanding and agree to have a   
responsible adult accompany the   
patient to their procedure and   
remain in the endoscopy area.  
Any barriers to Patient learning:   
Patient/Patient Representative   
responded appropriately on phone.  
Type of instruction given: Verbal   
by telephone contact.  
Trinidad Dumont LPN  
Electronically signed by Trinidad Dumont LPN at 2023 4:01 PM   
EDT  
documented in this encounter            [facility]  
   
                                2023 Note                 [facility]  
   
                                                    2023 History of   
Present illness Narrative               Formatting of this note might be   
different from the original.  
Q3 FRANSICO Triage Note  
  
Pt scheduled for upcoming   
endoscopic evaluation in Q3. Chart   
reviewed, no apparent   
contraindication at this time   
based on Q3 Indications for   
Anesthesia Consult and OR Cases.   
Final Anesthesia review and   
clearance will be performed on the   
day of the procedure, this note   
does note serve as procedural   
clearance.  
  
Seferino Barbosa PA-C  
  
Electronically signed by Seferino Barbosa PA-C at 2023 9:52   
AM EDT  
documented in this encounter            [facility]  
   
                                                    2023 Miscellaneous   
Notes                                   Formatting of this note is   
different from the original.  
ALEX Dixie/daughter, advised refill   
for Oxybutynin (Ditropan) was sent   
to [facility], 3/2/2023 for   
180 tablets, 3 refills, 1 year   
supply.  
  
Dixie did not request refill for   
Lisinopril, does not want Patient   
taking, BP is already low and   
drops it lower. Only needing   
refill of Nitro.  
  
Patient has been identified by   
name and date of birth: Yes  
Dixie/daughter phones for   
refill(s):  
Requested Prescriptions  
  
Pending Prescriptions Disp Refills  
nitroglycerin sublingual   
(NITROQUICK) 0.4 mg SL tablet 25   
tablet 1  
Sig: Dissolve 1 tablet under the   
tongue every 5 minutes as needed   
for chest pain. FOR CHEST PAIN. IF   
NO RELIEF CALL 911  
  
Date of last office visit in   
primary care: 3/20/2023  
6 month follow-up: 2023  
Last 2 Encounter Wt Readings:  
Date: Wt:  
2023 103.9 kg (229 lb)  
2023 107.3 kg (236 lb 9.6   
oz)  
Previous labs/tests for   
medication: Blood Pressure:  
BUN (mg/dL)  
Date Value  
2023 27  
2022 28  
  
Sodium (mmol/L)  
Date Value  
2023 139  
2022 139  
Last 1 Encounter BP Readings:  
Date: BP:  
2023 121/64  
Please advise. Thank you. Helen Zollinger LPN  
  
Electronically signed by Helen E Zollinger LPN at 2023 2:56   
PM EDT  
Formatting of this note is   
different from the original.  
Patient was seen 3/20/23 by Dr. Ahuja and the note on the   
encounter for patient other refill   
request states he was never seen   
in their office but he was. He was   
also asking for lisinopril in the   
last request but no one ever   
called the patient or responded.   
Patient has been identified by   
name and date of birth: Yes  
  
Requested Prescriptions  
  
Pending Prescriptions Disp Refills  
nitroglycerin sublingual   
(NITROQUICK) 0.4 mg SL tablet 25   
tablet 1  
Sig: Dissolve 1 tablet under the   
tongue every 5 minutes as needed   
for chest pain. FOR CHEST PAIN. IF   
NO RELIEF CALL 911  
oxybutynin ER (DITROPAN XL) 15 mg   
24 hr Extended Rel Tab 180 tablet   
3  
Sig: Take 2 tablets by mouth once   
daily.  
  
RX INSTRUCTIONS:  
Patient aware RX will be sent to   
pharmacy. No need to notify   
patient.  
  
Vane Méndez Pss  
  
Electronically signed by Vane Méndez Pss at 2023 2:42 PM   
EDT  
documented in this encounter            [facility]  
   
                                2023 Note                 [facility]  
   
                                2023 Note                 [facility]  
   
                                                    2023 History of   
Present illness Narrative               Formatting of this note might be   
different from the original.  
MITESH BRAUN  
[id number]  
2023  
  
[facility]  
Department of Radiation Oncology  
[facility]  
  
RADIATION ONCOLOGY: COMPLETION   
NOTE  
  
DATE OF SIMULATION: 2023  
  
DATES OF TREATMENT: 2023 to   
2023  
  
TREATMENT MACHINE: [facility]  
  
TREATMENT AREA: Liver HCC  
  
DIAGNOSIS: 83 year old man with   
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  
  
CONCURRENT THERAPY: No.  
  
DELIVERED DOSE: The Liver HCC PTV   
received a total dose of 3000 cGy   
in 1 fractions at 3000   
cGy/fraction prescribed to Max   
Dose at 73.5% IDL using 10 MV   
photons with SBRT Coplanar VMAT   
technique. CBCT was used for IGRT.  
  
ELAPSED DAYS: 0  
  
TOLERANCE: At last on-treatment   
visit, his toxicity was summarized   
as excellent with no acute   
toxicities.  
  
RESPONSE: To be assessed in   
outpatient clinic.  
  
REMARKS: The patient will be seen   
again in follow up in 3-4 months   
with MRI liver + labs.  
  
Resident Physician  
Moises Kaplan M.D.  
  
Staff Physician  
Marin Fish M.D  
37:50 AM  
Electronically Signed  
  
cc:  
Júnior Ahuja  
[address] [place] [address]  
Phone: [phone]  
Fax: [phone]  
  
Dr. Ortega, CC  
  
  
Electronically signed by Marin Fish MD at 2023 7:50 AM   
EDT  
documented in this encounter            [facility]  
   
                                                    2023 Miscellaneous   
Notes                                   Formatting of this note might be   
different from the original.  
Pt never seen in our department  
Electronically signed by Bere Cali MA at 2023 10:54 AM   
EDT  
Formatting of this note is   
different from the original.  
Patient has been identified by   
name and date of birth: Yes  
  
Requested Prescriptions  
  
Pending Prescriptions Disp Refills  
lisinopril (ZESTRIL) 40 mg tablet   
30 tablet 11  
Sig: Take 1 tablet by mouth once   
daily.  
nitroglycerin sublingual   
(NITROQUICK) 0.4 mg SL tablet 25   
tablet 3  
Sig: Dissolve 1 tablet under the   
tongue as needed. FOR CHEST PAIN.   
IF NO RELIEF CALL 911  
oxybutynin ER (DITROPAN XL) 15 mg   
24 hr Extended Rel Tab 90 tablet 3  
Sig: Take 1 tablet by mouth once   
daily.  
  
RX INSTRUCTIONS:  
Patient aware RX will be sent to   
pharmacy. No need to notify   
patient.  
  
Miranda Bejarano  
Electronically signed by Miranda Bejarano at 2023 10:40 AM EDT  
documented in this encounter            [facility]  
   
                                2023 Note                 [facility]  
   
                                        2023 Nurse Note Formatting of this  
 note might be   
different from the original.  
Mitesh Braun is here for an   
appointment today with Nurse and   
experienced a fall during his   
clinical visit.  
Location of fall: [place]-, Exam   
rm 16  
  
Brief Factual Description: The   
patient experienced loss of   
balance while changing with the   
assistance from his daughter   
Dixie. Exam room door was closed.   
Daughter opened up the door to ask   
for help to get patient back into   
his w/c. Pt was found with both   
hands on the sink and kneeling on   
his left knee. Pt assisted back to   
his w/c. Pt denies hitting his   
head and denies any pain or   
discomfort. No injury to left   
knee. VS obtained and stable. Dr. Fish aware and into see   
patient and his daughter. Patient   
declined further evaluation and   
left unit by w/c with his daughter   
Dixie.  
Contributing Factors: lost balance   
and failure to call for assistance  
Did patient hit head or neck? No  
  
Is the patient having any pain? No  
  
Is patient alert? YES  
  
Injury: No Injury Noted  
  
INTERVENTIONS:  
  
Immediate Actions Taken: Provider   
notified  
Vitals completed  
  
Name of LIP Notified: Dr. Marin Fish  
Additional Prevention   
Measures:instructed Patient to   
remain seated, instructed pt. to   
call for help as needed, door to   
room left opened , offered   
assistance with   
transfers/clothing, and pt refused   
interventions/assistance.  
  
Electronically signed by Janet Arboleda LPN at 2023 3:44   
PM EDT  
documented in this encounter            [facility]  
   
                                                    2023 History of   
Present illness Narrative               Formatting of this note might be   
different from the original.  
  
Radiation Oncology Nursing Note  
  
PATIENT NAME: Mitesh Braun  
PATIENT MRN: [id number]  
  
[facility] FACILITY/LOCATION: Main   
[place]  
  
PROCEDURE: Contrast Injection for   
CT Simulation  
  
Safety Checks  
Patient identified using 2   
identifiers: Yes  
NPO x 4 hours verified: Yes  
Creatinine level drawn within the   
last 60 days: Yes  
Creatinine level within normal   
limits: Yes  
IV start: Time: 1355. #22g   
angiocath placed in the Left AC.   
Positive blood return verified:   
Yes  
Physician order for contrast   
injection verified: Yes  
Patient's allergies verified,   
including CT contrast: Yes  
Is the patient having any pain?   
None. 0 on a scale of 0 to 10.  
Concerns about physical or   
emotional abuse: No  
Fall risk: Yes, At risk due to:   
unsteady gait, weakness, and use   
of a wheelchair. LIP Notified.  
  
SIGNED by Janet Arboleda LPN  
  
Radiation Therapy - Patient   
Education Note  
  
PATIENT NAME: Mitesh Braun  
PATIENT MRN: [id number]  
  
May 5, 2023  
  
[facility] FACILITY/LOCATION: [facility]  
  
READINESS TO LEARN  
Cognitive Ability: Alert and   
oriented  
Motivation to learn: Eager  
Family Support: High - Very   
involved in pt care  
Instruction provide to: Patient   
and family member  
Patient learns best by: Individual   
Instruction  
Written Instruction - Hand-outs  
Verbal Instruction  
Factors effecting learning: None  
Physical limitations effecting   
learning: Limited Mobility  
  
LEARNING RESPONSE  
Diagnosis: Pt simulated today for   
radiation therapy to abdomen.  
Education Topic/Teaching Points:   
Radiation therapy, Side effects,   
and OTV:  
Method of instruction: Individual   
instruction  
Written instruction - handouts  
Verbal instruction  
Patient /Family response: Patient   
and family verbalized   
understanding of radiation   
treatments, side effects, OTV, and   
transportation.  
Follow-up plan: Patient instructed   
to call with any further issues  
Supplemental material:   
Informational handouts on SBRT   
Liver Handout.  
Referral (recommendation): None,   
Pt denied need for social work,   
van service, and dietician.  
  
Was  approved? Clinical   
questionnaires incomplete due to   
Patient declined to complete or   
answer questions with nurse  
  
Signed by: Janet Arboleda LPN  
Electronically signed by Janet Arboleda LPN at 2023 2:29   
PM EDT  
documented in this encounter            [facility]  
   
                                2023 Note                 [facility]  
   
                                2023 Note                 [facility]  
   
                                2023 Note                 [facility]  
   
                                                    2023 History of   
Present illness Narrative               Formatting of this note might be   
different from the original.  
MITESH BRAUN.  
[id number]  
2023  
  
[facility]  
Department of Radiation Oncology  
[facility]  
  
RADIATION ONCOLOGY - Therapist   
Injection Note  
  
  
Procedure: Contrast Injection for   
CT Simulation  
  
Safety Checks:  
Patient identified using two   
identifiers: Yes Physician order   
for contrast injection verified:   
Yes Patient's allergies verified,   
including CT contract: Yes eGFR   
verified: Yes  
Omnipaque:  300  mg/ml Volume:   
150cc  
Time contrast administered: 2:45   
pm  
  
Complications/Reaction: Yes No  
  
Patient Disposition:  
Patient IV access: Peripheral  
Time IV removed: 3:15 pm  
Patient/family provided with   
treatment instructions: Yes   
Patient/family verbally   
acknowledged understanding of   
treatment instructions: Yes  
Disposition: home  
  
Therapist: husam  
  
  
Electronically signed by Ccf   
Provider at 2023 3:19 PM EDT  
documented in this encounter            [facility]  
   
                                                    2023 History of   
Present illness Narrative               Formatting of this note might be   
different from the original.  
KADEMITESH  
[id number]  
  
2023  
  
[facility]  
Department of Radiation Oncology  
[facility]  
  
RADIATION ONCOLOGY SIMULATION NOTE  
  
DATE OF SIMULATION: 2023  
  
MACHINE: CT Simulator  
  
DIAGNOSIS: 83-year-old male with   
newly diagnosed radiographic HCC   
per MRI obtained 2023 showing   
a 4.1 cm tumor in segment 4A.   
Systemic staging, labs for   
classification (child Coffey, MELD)   
pending. Tentatively intermediate   
stage per BCLC, cT2 N0 MX stage II   
per AJCC.  
  
AREA:Segment 4a liver HCC.  
  
PATIENT POSITION: Supine.  
  
CONTRAST: 150cc IV omnipaque.  
  
PROTOCOL: None  
  
BLOCKS: Custom blocks are   
necessary to develop an optimal   
plan.  
  
FIXATION DEVICE: In order to   
achieve accurate and reproducible   
treatments, the patient is   
immobilized with a bodyfix device   
and ABC for respiratory motion   
management.  
  
PROCEDURE: A time-out was   
conducted and recorded by the   
therapist. Patient was simulated   
on the CT scanner for external   
beam radiation therapy. Use of the   
ABC device for respiratory motion   
management/ITV creation was   
reviewed at the time of simulation   
and found to be adequate.   
Treatment site was marked by the   
simulation therapist.  
  
ASSESSMENT/PLAN: Patient tolerated   
simulation procedure well.   
Treatments will be initiated after   
treatment planning. The patient   
will be scheduled for a   
verification simulation on the   
treatment machine to ensure proper   
set-up and field arrangement is   
correct prior to the first   
treatment of primary and any boost   
fields if applicable.  
  
Electronically Signed  
Marin Fish M.D.  
:47 PM  
  
  
Electronically signed by Marin Fish MD at 2023 4:47 PM   
EDT  
documented in this encounter            [facility]  
   
                                                    2023 Miscellaneous   
Notes                                   Formatting of this note is   
different from the original.  
RADIATION ONCOLOGY NURSING PRE-SIM   
CALL  
  
Today's date: May 4, 2023  
Time: 11:32 AM  
  
SIM date: 23  
  
Spoke with patient's daughter,   
Dixie. Patient instructed to be   
NPO after 10am, daughter   
verbalized understanding.  
  
SIM needs:  
Do you have a Mediport or PICC: No  
  
Anxiety / Claustrophobia History:   
No  
  
Pain Needs:  
Is that patient having pain that   
will impact SIM? No  
Will patient need pain medication   
prior to SIM? No  
  
Last Creatinine:  
Creatinine  
Date Value Ref Range Status  
2023 1.05 0.73 - 1.22 mg/dL   
Final  
]  
Last BUN:  
BUN  
Date Value Ref Range Status  
2023 27 (H) 9 - 24 mg/dL   
Final  
  
Last GFR: No results found for:   
EGFR  
IV Contrast: No  
Diabetic: No  
Pregnancy Status: Patient is male  
  
Maribell Hart RN  
Electronically signed by Maribell Hart RN at 2023 11:37   
AM EDT  
documented in this encounter            [facility]  
   
                                2023 Note                 [facility]  
   
                                2023 Note                 [facility]  
   
                                2023 Note                 [facility]  
   
                                2023 Note                 [facility]  
   
                                                    2023 History of   
Present illness Narrative               Formatting of this note might be   
different from the original.  
[facility]   
[facility] received prescription(s)   
for Xifaxan from Dr. Juanito Esparza's office. Benefits   
investigation was conducted,   
indicating that a prior   
authorization is required by   
patient's medicare part D   
insurance plan with [employer].  
  
Encounter will be updated once   
prior authorization has been   
submitted by [facility].  
  
Mari Giles [facility]   
[facility]  
[id number]  
[place] [address]  
[email or web address]  
[phone]  
[phone]  
  
Electronically signed by Mari Giles (Pharmacy Tech) at   
2023 11:50 AM EDT  
Formatting of this note might be   
different from the original.  
Xifaxan PA was initiated and   
pending review.  
  
Plan Name: [employer] med d  
Plan Agent/Key: [email or web address]   
(key [id number])  
Phone: [phone]  
Case: [id number]  
Timeline: 24-72 hrs  
  
Mari Giles (Pharmacy Tech)  
Electronically signed by Mari Giles (Pharmacy Tech) at   
2023 3:58 PM EDT  
documented in this encounter            [facility]  
   
                                2023 Note                 [facility]  
   
                                        2023 Instructions   
  
  
Juanito Esparza MD -   
2023 9:20 AM EDTFormatting   
of this note might be different   
from the original.  
As discussed in the office, you   
have a diagnosis of cirrhosis.  
The main complications of   
cirrhosis are:  
1. Ascites (fluid in the belly)  
2. Hepatic Encephalopathy   
(confusion)  
3. Varices (veins in the esophagus   
that can bleed)  
4. Hepatocellular Carcinoma (liver   
cancer)  
  
Should you develop any new   
symptoms or have worsening   
symptoms please contact our office   
immediately.  
  
You should go to the nearest   
emergency room and call our office   
immediately if any of the   
following occur:  
1. Temperature of 101 or above  
2. Confusion  
3. Vomiting blood  
4. Multiple black or red stools  
5. Shortness of breath  
6. Severe diarrhea  
7. Vomiting for more than twice in   
24 hours  
  
-Call my office to discuss any   
surgeries (elective or emergent)   
since cirrhotic patients are at an   
increased risk for surgery in   
terms of infection, bleeding, and   
even death.  
-Call my office if you are ever   
seen in the ER or admitted to the   
hospital.  
  
General Instructions:  
-Liver cancer prevention: You will   
need a picture of your liver with   
an ultrasound or MRI every 6   
months to screen for liver cancer.   
Each year you are at a small risk   
of liver cancer, and so this step   
is important to preventing the   
development of liver cancer and   
catching it early.  
  
-Diet/exercise:  
-It is very important to limit the   
amount of salt you eat on a daily   
basis. Salt (or  sodium  as it is   
labeled on most food labels) can   
lead to retention of fluid in the   
belly and legs. Limiting sodium to   
< 2000 mg (or 2 grams) is key to   
prevent this complication. Salt is   
hidden in nearly everything (even   
sweet foods!) so it is very   
important to read labels before   
cooking with or eating something.   
One of the best ways to know and   
limit how much salt you eat is to   
cook/prepare as much fresh food as   
you can.  
-In cirrhosis, your muscles tend   
to waste/atrophy, and so it is   
important to get a sufficient   
amount of protein in the diet.   
Plant-based protein and lean cuts   
of meat or fish are healthy. Try   
to avoid red meat if possible.  
-Avoid all avoid raw shellfish and   
undercooked meat- eating this can   
put you at risk of developing   
certain very serious infections.  
-Abstain from all alcohol intake.   
Even a small amount can have   
tremendous impact on your liver   
health.  
Try and participate in daily   
exercise or joyful movement every   
day.  
  
Medications:  
-You will likely be on many   
medications and it can sometimes   
be very difficult to remember when   
to take them. Something like a   
pill box or a phone alarm can be   
very helpful in reminding you when   
you should take your medications.  
-Take no more than 2 grams of   
tylenol in 24 hours (a sick liver   
cannot effectively process certain   
medications and too much tylenol   
can be dangerous to the liver and   
even lead to liver failure).  
-Because the liver cannot process   
certain medications as effectively   
when you have cirrhosis, avoid   
medications such as Benadryl,   
benzodiazepines (Ativan, valium,   
etc), and certain opiate pain   
medications if possible (tramadol,   
norco, oxycontin, dilaudid,   
morphine). These medications can   
lead to worsening confusion and   
sleepiness.  
-Avoid medications like Ibuprofen,   
Motrin, Aleve, Excedrin. These   
medications are non-steroidal   
anti-inflammatory drugs (NSAIDs)   
and can put lots of undue stress   
on the kidneys (which are   
particularly sensitive in patients   
with cirrhosis).  
-Call my office to discuss the   
risks and benefits of any new   
medications ( prescribed or over   
the counter) before taking the new   
medication.  
  
Other/general:  
-You should ask your primary care   
to ensure you have been vaccinated   
for pneumonia and the flu  
-If you are starting to struggle   
with confusion and memory loss, it   
is NOT safe to drive a vehicle or   
operate heavy machinery. Having   
cirrhosis with confusion can lead   
to impaired reflexes, and driving   
under these circumstances is like   
driving under the influence of   
drugs or alcohol. Please talk to   
your doctor (and our office) if   
you or a family member notices you   
are struggling with this.  
  
For more information about   
cirrhosis and how to navigate   
living with this disease, one   
excellent website is   
https://www.cirrhosiscare.ca/  
This provides lots of good   
resources for patients and their   
family members. Highly recommend   
this!  
Electronically signed by Juanito Esparza MD at 2023 9:20 AM   
EDT  
  
documented in this encounter            [facility]  
   
                                                    2023 History of   
Present illness Narrative               Formatting of this note is   
different from the original.  
HCC Clinic New Patient  
Date of visit:  
  
CC: new diagnosis of HCC  
  
HPI: This is a 83 year old with   
medical history of   
well-compensated CÁRDENAS-related   
cirrhosis (MELD-Na 8) complicated   
by HCC (within [place]), possible   
HE, PUD (bled ) here for   
follow-up  
Follows with Maurizio Cochran for CÁRDENAS  
  
Long-standing history of NAFLD  
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   
CÁRDENAS-related cirrhosis (given his   
risk factors) in 2022 on   
further workup of anemia. Saw   
hematology/oncology for this,   
eventually had liver imaging which   
noted a mass in 2022, recent   
MRI in April with cirrhosis along   
with a 4.1 cm HCC in seg 4A  
Case discussed at tumor board and   
he was referred for SBRT (limited   
options given age, functional   
limitations, etc)  
He met with radiation oncology   
just recently and is eager to get   
treatment started  
  
With respect to his liver-related   
symptoms:  
Sleeps a lot in the daytime, not   
so much at night. Possibly a bit   
more confusion when he talks.  
Has at least 4-5 soft bowel   
movements/day,  black as coal    
because he is taking iron   
supplements.  
No abdominal distention. Takes   
lasix for LE edema  
No jaundice, pruritis  
  
HCC history:  
Diagnosis date: 2022  
Initial AFP: 8.5  
Biopsy-proven?: no  
Imaging: MRI  
Within [place]: Yes  
CPT score: A  
ECOG performance status: 1  
  
Treatment history:  
LT candidate: no  
Resection: no  
Locoregional therapy: getting SBRT  
  
Review of Systems:  
See note  
  
Past Medical History:  
PAST MEDICAL HISTORY  
Diagnosis Date  
Adenomatous colon polyp 8/3/2011  
Adjustment disorder with depressed   
mood 3/9/2012  
Aortic valve disorders 2007  
stenosis  
Asthma exacerbation 11/10/2012  
Calculus of ureter 2005  
Esophageal reflux 10/14/2011  
Essential hypertension 10/14/2005  
Impaired renal function 4/15/2010  
Impotence of organic origin   
2006  
Intestinal polyp 8/3/2011  
Nonspecific abnormal results of   
liver function study 2006  
Obesity, unspecified  
CAREN on CPAP 10/13/2005  
Other abnormal blood chemistry  
Hyperuricemia  
Other and unspecified   
hyperlipidemia 10/14/2005  
Peripheral polyneuropathy 2/3/2015  
Polycystic kidney 2010  
Snoring  
Splenomegaly 9/10/2014  
Thrombocytopenia (HCC) 10/5/2011  
  
Family History:  
FAMILY HISTORY  
Problem Relation Age of Onset  
Heart Mother  
 in 70's  
Coronary Artery Disease Father  
 in his 80's  
Emphysema Father  
Diabetes Brother  
  
Social History:  
Tobacco: denies  
EtOH: denies  
Substances: denies  
  
I have confirmed and edited as   
necessary, the PFSH and ROS   
obtained by others.  
  
Current Outpatient Medications on   
File Prior to Visit  
Medication Sig  
oxybutynin ER (DITROPAN XL) 15 mg   
24 hr Extended Rel Tab Take 2   
tablets by mouth once daily.  
furosemide (LASIX) 20 mg tablet   
TAKE 1 TABLET BY MOUTH EVERY OTHER   
RDAY  
Diaper,Brief, Adult,Disposable   
(DEPEND EASY FIT UNDERGARMENTS) 1   
Each three times daily.  
ascorbic acid, vitamin C, (VITAMIN   
C) 500 mg tablet Take 1 tablet by   
mouth once daily.  
ascorbic acid-elderberry fruit   
100-50 mg chew Take 2 tablets by   
mouth once daily.  
cyanocobalamin (VITAMIN B-12)   
2,500 mcg tablet Take 2,500 mcg by   
mouth once daily.  
aspirin, enteric coated (ASPIRIN,   
ENTERIC COATED) 81 mg EC tablet   
Take 81 mg by mouth once daily.  
albuterol HFA (PROVENTIL HFA,   
VENTOLIN HFA) 90 mcg/actuation   
inhaler Inhale 2 Puffs as   
instructed every 4 hours as needed   
for wheezing/shortness of breath.  
omeprazole (PRILOSEC) 40 mg   
capsule Take 1 capsule by mouth   
once daily.  
allopurinol (ZYLOPRIM) 300 mg   
tablet Take 1 tablet by mouth once   
daily.  
magnesium oxide 400 mg magnesium   
cap Take 1 capsule by mouth once   
daily.  
fenofibrate nanocrystallized   
(TRICOR) 48 mg tablet Take 1   
tablet by mouth once daily.  
sertraline (ZOLOFT) 50 mg tablet   
Take 1 tablet by mouth once daily.  
nitroglycerin sublingual   
(NITROQUICK) 0.4 mg SL tablet   
Dissolve 1 tablet under the tongue   
every 5 minutes as needed for   
chest pain. FOR CHEST PAIN. IF NO   
RELIEF CALL 911  
fluticasone (FLONASE) 50   
mcg/actuation nasal spray Use 1   
Spray in each nostril once daily.  
ferrous sulfate 325 mg (65 mg   
iron) EC tablet Take 1 tablet by   
mouth twice daily with meals.   
(Patient taking differently: Take   
325 mg by mouth once daily.)  
fluticasone-vilanterol (BREO   
ELLIPTA) 200-25 mcg/dose inhaler   
Inhale 1 Inhalation as instructed   
once daily. Inhale one puff once   
daily. DO NOT CLICK OPEN UNTIL   
READY FOR DOSE  
multivitamin tablet Take 1 tablet   
by mouth once daily.  
torsemide (DEMADEX) 20 mg tablet   
Take 2 tablets by mouth once daily   
for 5 days.  
melatonin 10 mg tab Take 1 tablet   
by mouth as needed.  
amoxicillin (POLYMOX, AMOXIL) 500   
mg capsule Take four tablets, one   
hour prior to dental work (Patient   
not taking: Reported on 2023)  
  
No current facility-administered   
medications on file prior to   
visit.  
  
Vitals:  
Ht 5' 10  (1.78m)   Wt 246 lb 12.8   
oz (111.9kg)   BMI 35.41 kg/(m^2).  
  
  
Physical Exam:  
Well appearing, in NAD  
No jaundice  
Aox3  
In a wheelchair  
  
Laboratory:  
MELD-Na score: 8 at 2023 2:27   
PM  
MELD score: 8 at 2023 2:27 PM  
Calculated from:  
Serum Creatinine: 1.09 mg/dL at   
2023 2:27 PM  
Serum Sodium: 140 mmol/L (Using   
max of 137 mmol/L) at 2023   
2:27 PM  
Total Bilirubin: 0.5 mg/dL (Using   
min of 1 mg/dL) at 2023 2:27   
PM  
INR(ratio): 1.1 at 2023 2:27   
PM  
Age: 83 years  
  
Disclaimer: The MELD Calculator   
cannot calculate MELD scores for   
patients on dialysis.  
  
AFP  
Lab Results  
Component Value Date  
AFP 8.5 2023  
  
Imaging:  
MRI Liver 2022:  
Lesion#: 1  
Location: Segment Keren; (series 15   
image 19)  
Size (maximum long-axis   
dimension): 4.1 cm, Prior size:   
N/A  
Arterial Phase Hyperenhancement   
(APHE): Nonrim APHE  
Wash-out: Yes - non-peripheral  
Enhancing Capsule: Yes  
Threshold growth (50% or more   
growth in 6 months or less): N/A  
Ancillary features favoring   
malignancy or HCC: Fat in mass   
more than  
adjacent liver  
Ancillary features favoring   
benignity: N/A  
LI-RADS Category: LR-5 (definitely   
HCC)  
OPTN Class 5 Category:5B  
  
Hepatic vasculature and   
collaterals: ...  
  
Portal venous system (splenic   
vein, main portal vein, left and   
right  
anterior and right posterior   
portal vein branches): Patent.  
Portal vein diameter: 1.6 cm.  
Celiac trunk and SMA: Patent. No   
stenosis.  
Hepatic artery: Patent. Replaced   
right hepatic artery from SMA.  
Hepatic veins: Patent.  
Collaterals:  
Spontaneous splenorenal shunt:   
Absent  
Recanalized paraumbilical vein:   
Small  
Esophageal varices: Absent.  
Mesenteric portosystemic   
collaterals: Absent  
  
Related extrahepatic findings:  
  
Spleen: 13.7 cm (craniocaudally),   
enlarged. No mass.  
  
Mesentery/Peritoneum: Trace   
ascites. No mass.  
  
Other findings:  
  
Biliary: No bile duct dilation.   
Gallbladder present with   
cholelithiasis.  
  
Pancreas: No mass or duct   
dilation.  
  
Adrenals: No mass.  
  
Kidneys: Multiple bilateral cysts.   
No solid mass. No hydronephrosis.  
  
GI: No dilated bowel or wall   
thickening along imaged segments.   
Small  
hiatal hernia  
  
Lymph nodes: No abdominal   
lymphadenopathy.  
  
Vasculature: No abdominal aortic   
aneurysm.  
  
Bones/Soft Tissues: Degenerative   
change. Focal enhancement in the   
right  
L1 transverse process, likely   
posttraumatic. No other osseous   
lesion.  
  
Lower chest: Left lower lobe   
mucoid impaction. Bronchial wall   
thickening.  
  
Endoscopy:  
EGD :  
Findings:  
The examined jejunum was normal.  
The examined duodenum was normal.  
Localized moderate inflammation   
characterized by erosions,   
erythema  
and friability was found in the   
gastric antrum. Biopsies were   
taken  
with a cold forceps for histology.  
A medium-sized hiatal hernia was   
present.  
Non-severe esophagitis with no   
bleeding was found.  
  
Assessment and plan:  
This is a 83 year old with medical   
history of well-compensated   
C[place]RDE[place]-related cirrhosis (MELD-Na 8)   
complicated by HCC (within [place]),   
possible HE, PUD (bled ) here   
for follow-up, doing well  
We discussed the natural   
progression of his liver disease   
and signs/symptoms to look out for   
which would suggest progression of   
disease  
He is well-compensated at the   
moment which bodes well for   
treatment options in the future if   
he were to progress from a cancer   
standpoint  
  
Cirrhosis:  
HE: sounds as if he has HE but has   
loose stool throughout the day.   
Will check zinc, start rifaximin  
EV: EGD ordered, needs to schedule  
Ascites/SBP: none on imaging  
HCC: Planning on SBRT for his HCC   
which is appropriate given his age   
and comorbidities. Needs CT chest   
so will make sure that is   
scheduled  
  
RTC 3 months  
  
I spent a total of 45 minutes on   
the date of the service which   
included preparing to see the   
patient, face-to-face patient   
care, completing clinical   
documentation, obtaining and/or   
reviewing separately obtained   
history, performing a medically   
appropriate examination,   
counseling and educating the   
patient/family/caregiver, ordering   
medications, tests, or procedures,   
and communicating with other HCPs   
(not separately reported).  
  
Juanito Esparza MD  
Associate Staff, Department of   
Gastroenterology and Hepatology  
Dig[facility]ive Disease [facility] Surgery   
[place]  
  
--  
ASSESSMENT/PLAN:  
1. 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   
ascites, unspecified hepatic   
cirrhosis type (HCC) - ICD9:   
571.5, ICD10: K74.60  
- ZINC BLD  
- HEPATIC FUNCTION PNL  
- PROTHROMBIN TIME/PT  
- CBC + DIFF  
- BASIC METABOLIC PNL  
  
Juanito Esparza MD  
Electronically signed by Juanito Esparza MD at 2023 10:27   
AM EDT  
documented in this encounter            [facility]  
   
                                2023 Note                 [facility]  
   
                                                    2023 History of   
Present illness Narrative               Formatting of this note is   
different from the original.  
Images from the original note were   
not included.  
Radiation Oncology - New   
Patient/Consult Note  
  
PATIENT NAME: Mitesh Braun  
PATIENT MRN: [id number]  
  
REQUESTING PROVIDER: Tello Ortega DO  
  
DIAGNOSIS: 83-year-old male with   
newly diagnosed radiographic HCC   
per MRI obtained 2023 showing   
a 4.1 cm tumor in segment 4A.   
Systemic staging, labs for   
classification (child Coffe[place], MELD)   
pending. Tentatively intermediate   
stage per BCLC, cT2 N0 MX stage II   
per AJCC. Discussed at tumor board   
and referred for consideration of   
SBRT.  
  
HPI: 83 year old male who presents   
with above diagnosis, for an   
opinion regarding the role of   
radiation therapy in the   
management of the patient's   
disease. Final recommendations   
will be communicated back to the   
requesting physician by way of the   
shared medical record, or letter   
to requesting physician via US   
mail.  
  
83-year-old gentleman with a past   
medical history of polycystic   
kidney disease, coronary artery   
disease, obstructive sleep apnea,   
asthma, and chronic anemia who was   
recently diagnosed with cirrhosis   
complicated by hepatocellular   
carcinoma as outlined above. In   
review, he was first seen by   
hematology/oncology on 2022   
for evaluation of persistent   
anemia with concern for a   
presentation possibly consistent   
with multiple myeloma or MGUS.   
Given his history of splenomegaly,   
an ultrasound of the liver and   
spleen was ordered. This was   
obtained on 2022 and showed   
splenomegaly, cirrhosis (not   
previously described), and a  new   
hyperechoic nodule in the right   
lobe of the liver which could be   
further evaluated by MRI . In   
2023 he underwent bone   
marrow biopsy which did not reveal   
any evidence of  smoldering   
myeloma or multiple myeloma . He   
does have a low level IgG kappa   
monoclonal gammopathy consistent   
with monoclonal gammopathy of   
unknown significance. He was also   
treated for iron deficiency   
anemia, with plans to reassess in   
1 year with medical oncology. He   
was also scheduled for a liver   
MRI, however, needed the MRI to be   
open and also required anesthesia   
and was unable to obtain until   
2023. At this time, MRI did   
show a cirrhotic liver with a 4.1   
cm liver mass compatible with HCC   
in segment 4A. His case was   
discussed at liver tumor board   
with recommendation for pursuit of   
local therapy, and he has been   
referred for discussions of SBRT.  
  
Today he presents with his   
daughter and son-in-law on speaker   
phone. He reiterates the history   
provided above. He notes that he   
needed the MRI under anesthesia   
which was obtained without issue.   
He reports that his cancer was   
discovered via US as part of his   
workup for anemia. He has not had   
significant issues recently and   
tolerated MRI well. He denies   
headache, lightheadedness,   
dizziness, syncope. Eating and   
drinking OK, swallowing without   
issue. No chest pain, worsening   
SOB, cough or hemoptysis. No   
abdominal pain, N, V. Urination   
and defecation at baseline without   
blood. Walks with a walker no   
change from prior; no focal   
deficits.  
  
In terms of his past medical   
history, he has listed diagnoses   
of GERD, hypertension, CKD, CAREN,   
MGUS, peripheral neuropathy,   
polycystic kidney disease,   
splenomegaly, and   
thrombocytopenia. No prior   
diagnoses of cancer. No prior   
history of radiation therapy. No   
diagnosis of connective tissue   
disease, ulcerative colitis, or   
Crohn's disease.  
  
Prior radiation therapy, collagen   
vascular disease, or inflammatory   
bowel disease: No  
Pregnancy status: Male  
  
Residence: [place]  
Occupation: Retired   
  
WEIGHT  
Last 5 Encounter Wt Readings:  
Date: Wt:  
2023 108.9 kg (240 lb)  
2023 109.3 kg (241 lb)  
03/10/2023 108.2 kg (238 lb 9.6   
oz)  
2023 108.2 kg (238 lb 8 oz)  
2023 111.6 kg (246 lb)  
  
IMAGING  
2023: MRI liver  
IMPRESSION:  
  
Cirrhotic liver.  
  
4.1 cm liver mass compatible with   
HCC.  
  
Left lower lobe mucoid impaction.   
Bronchial wall thickening.  
  
RESULT:  
  
Hepatic morphology and masses:   
Cirrhotic morphology.  
  
Lesion#: 1  
Location: Segment Keren; (series 15   
image 19)  
Size (maximum long-axis   
dimension): 4.1 cm, Prior size:   
N/A  
Arterial Phase Hyperenhancement   
(APHE): Nonrim APHE  
Wash-out: Yes - non-peripheral  
Enhancing Capsule: Yes  
Threshold growth (50% or more   
growth in 6 months or less): N/A  
Ancillary features favoring   
malignancy or HCC: Fat in mass   
more than  
adjacent liver  
Ancillary features favoring   
benignity: N/A  
LI-RADS Category: LR-5 (definitely   
HCC)  
OPTN Class 5 Category:5B  
  
Hepatic vasculature and   
collaterals: ...  
  
Portal venous system (splenic   
vein, main portal vein, left and   
right  
anterior and right posterior   
portal vein branches): Patent.  
Portal vein diameter: 1.6 cm.  
Celiac trunk and SMA: Patent. No   
stenosis.  
Hepatic artery: Patent. Replaced   
right hepatic artery from SMA.  
Hepatic veins: Patent.  
Collaterals:  
Spontaneous splenorenal shunt:   
Absent  
Recanalized paraumbilical vein:   
Small  
Esophageal varices: Absent.  
Mesenteric portosystemic   
collaterals: Absent  
  
Related extrahepatic findings:  
  
Spleen: 13.7 cm (craniocaudally),   
enlarged. No mass.  
  
Mesentery/Peritoneum: Trace   
ascites. No mass.  
  
Other findings:  
  
Biliary: No bile duct dilation.   
Gallbladder present with   
cholelithiasis.  
  
Pancreas: No mass or duct   
dilation.  
  
Adrenals: No mass.  
  
Kidneys: Multiple bilateral cysts.   
No solid mass. No hydronephrosis.  
  
GI: No dilated bowel or wall   
thickening along imaged segments.   
Small  
hiatal hernia  
  
Lymph nodes: No abdominal   
lymphadenopathy.  
  
Vasculature: No abdominal aortic   
aneurysm.  
  
Bones/Soft Tissues: Degenerative   
change. Focal enhancement in the   
right  
L1 transverse process, likely   
posttraumatic. No other osseous   
lesion.  
  
Lower chest: Left lower lobe   
mucoid impaction. Bronchial wall   
thickening.  
  
LABS  
Component Latest Ref Rng & Units   
2023  
WBC 3.70 - 11.00 k/uL 3.87  
RBC 4.20 - 6.00 m/uL 3.63 (L)  
Hemoglobin 13.0 - 17.0 g/dL 11.8   
(L)  
Hematocrit 39.0 - 51.0 % 36.2 (L)  
MCV 80.0 - 100.0 fL 99.7  
MCH 26.0 - 34.0 pg 32.5  
MCHC 30.5 - 36.0 g/dL 32.6  
RDW-CV 11.5 - 15.0 % 20.0 (H)  
Platelet Count 150 - 400 k/uL 66   
(L)  
MPV 9.0 - 12.7 fL 9.8  
Neut% % 79.1  
Abs Neut (ANC) 1.45 - 7.50 k/uL   
3.06  
Lymph% % 11.1  
Abs Lymph 1.00 - 4.00 k/uL 0.43   
(L)  
Mono% % 6.2  
Abs Mono <0.87 k/uL 0.24  
Eosin% % 2.8  
Abs Eosin <0.46 k/uL 0.11  
Baso% % 0.5  
Abs Baso <0.11 k/uL <0.03  
Immature Gran % % 0.3  
IMMATURE GRANS (ABS) <0.10 k/uL   
<0.03  
NRBC /100 WBC 0.0  
Absolute nRBC <0.01 k/uL <0.01  
DTYPE Auto  
Protein, Total 6.3 - 8.0 g/dL 6.0   
(L)  
Albumin 3.9 - 4.9 g/dL 3.4 (L)  
Calcium 8.5 - 10.2 mg/dL 10.1  
Bilirubin, Total 0.2 - 1.3 mg/dL   
0.4  
Alkaline Phosphatase 38 - 113 U/L   
161 (H)  
AST 14 - 40 U/L 41 (H)  
ALT 10 - 54 U/L 29  
Glucose 74 - 99 mg/dL 97  
BUN 9 - 24 mg/dL 32 (H)  
Creatinine 0.73 - 1.22 mg/dL 1.01  
Sodium 136 - 144 mmol/L 137  
Potassium 3.7 - 5.1 mmol/L 4.2  
Chloride 97 - 105 mmol/L 106 (H)  
CO2 22 - 30 mmol/L 24  
Anion Gap 9 - 18 mmol/L 7 (L)  
eGFR >=60 mL/min/1.73m 74  
AFP <11.0 ng/mL 8.5  
  
ALLERGIES  
Allergen Reactions  
Bees Anaphylaxis  
Acetaminophen Other: See Comments  
Hydrocodone GI Upset  
Lipitor [Atorvastat* Itching  
Singulair [Monteluk* Itching  
[place] Hives  
hives  
  
PAST MEDICAL HISTORY  
Diagnosis Date  
Adenomatous colon polyp 8/3/2011  
Adjustment disorder with depressed   
mood 3/9/2012  
Aortic valve disorders 2007  
stenosis  
Asthma exacerbation 11/10/2012  
Calculus of ureter 2005  
Esophageal reflux 10/14/2011  
Essential hypertension 10/14/2005  
Impaired renal function 4/15/2010  
Impotence of organic origin   
2006  
Intestinal polyp 8/3/2011  
Nonspecific abnormal results of   
liver function study 2006  
Obesity, unspecified  
CAREN on CPAP 10/13/2005  
Other abnormal blood chemistry  
Hyperuricemia  
Other and unspecified   
hyperlipidemia 10/14/2005  
Peripheral polyneuropathy 2/3/2015  
Polycystic kidney 2010  
Snoring  
Splenomegaly 9/10/2014  
Thrombocytopenia (HCC) 10/5/2011  
  
PAST SURGICAL HISTORY  
Procedure Laterality Date  
ARTHRP KNE CONDYLE&PLATU   
MEDIAL&LAT COMPARTMENTS Left   
2017  
BAL. ASSIST ENTEROSCOPY W/ BX   
2011  
CAPSULE ENDO SMALL BOWEL W EGD   
2011  
COLONOSCOPY FLX DX W/COLLJ SPEC   
WHEN PFRMD   
Colonoscopy  
COLONOSCOPY FLX DX W/COLLJ SPEC   
WHEN PFRMD   
Colonoscopy  
COLONOSCOPY FLX DX W/COLLJ SPEC   
WHEN PFRMD 2011  
Colonoscopy  
COLONOSCOPY FLX DX W/COLLJ SPEC   
WHEN PFRMD 2018  
Colonoscopy  
COLONOSCOPY W/BIOPSY   
SINGLE/MULTIPLE 2008  
ESOPHAGOGASTRODUODENOSCOPY   
TRANSORAL DIAGNOSTIC 2018  
EGD  
ESOPHAGOGASTRODUODENOSCOPY   
TRANSORAL DIAGNOSTIC 2019  
EGD  
ESOPHAGOGASTRODUODENOSCOPY   
TRANSORAL DIAGNOSTIC 2020  
EGD  
EXCISION PILONIDAL CYST/SINUS   
SIMPLE 1964  
HEMORRHOIDECTOMY INTERNAL RUBBER   
BAND LIGATIONS 2018 and 18  
OPTX FEM SHFT FX W/INSJ IMED IMPLT   
W/WO SCREW Left 2020  
L hip cephalo medullary nail.  
RPLCMT AORTIC VALVE OPN   
W/STENTLESS TISSUE VALVE   
10/04/2011  
25-mm Liza-Stack   
pericardial prosthesis via upper   
ministernotomy.  
RPR UMBILICAL HRNA 5 YRS/>   
REDUCIBLE 09/10/2010  
Hernia repair, umbilical >5yr  
  
FAMILY HISTORY  
Problem Relation Age of Onset  
Heart Mother  
 in 70's  
Coronary Artery Disease Father  
 in his 80's  
Emphysema Father  
Diabetes Brother  
  
Social History  
  
Tobacco Use  
Smoking status: Never  
Smokeless tobacco: Never  
Vaping Use  
Vaping Use: Never used  
Substance Use Topics  
Alcohol use: No  
Drug use: No  
  
COMPLETE REVIEW OF SYSTEMS:  
See HPI  
  
PHYSICAL EXAM:  
VS: /58   Pulse 70   Resp 18   
  Wt 108.9 kg (240 lb)   SpO2 89%   
  BMI 37.59 kg/m  
KPS: 70  
General Appearance: Alert and   
oriented. No acute distress.  
HEENT: NCAT. Sclera anicteric.   
EOMI.  
Neck: Normal ROM.  
Chest: No respiratory distress.  
Heart: Regular rate.  
Abdomen: Soft. Nondistended.  
Musculoskeletal: Baseline ROM in   
extremities.  
Neuro: Speech fluent. No focal   
deficits.  
Skin: No rashes noted.  
Hematologic: No signs of active   
bleeding.  
  
RADIOLOGY/LABORATORY DATA: see HPI  
  
ASSESSMENT AND PLAN: 83-year-old   
male with newly diagnosed   
radiographic HCC per MRI obtained   
2023 showing a 4.1 cm tumor   
in segment 4A. Systemic staging,   
labs for classification (child   
Coffey, MELD) pending. Tentatively   
intermediate stage per BCLC, cT2   
N0 MX stage II per AJCC. Discussed   
at tumor board and referred for   
consideration of SBRT. Today we   
discussed his history of   
presentation and most recent   
imaging results. We discussed that   
his blood work and systemic   
staging with further imaging   
studies is still pending. Based on   
the information at hand, we   
discussed the role of stereotactic   
body radiation therapy in the   
treatment of hepatocellular   
carcinoma and its utility for his   
situation. We discussed the risks,   
benefits, alternatives,   
procedures, mechanics and   
personnel involved in treatment.   
We also discussed the possible   
acute and late side effects   
involved. Mr. Hurt verbalized   
understanding of these issues and   
all questions were answered at   
this time. He will be scheduled   
for simulation. Further discussion   
on Renate vs main [facility] and 3 vs 5   
fractions as per Dr. Fish   
addendum below. This was discussed   
with him and he is aware.  
  
Joseph Cowart MD  
Radiation Oncology Resident  
[phone]  
  
STAFF ADDENDUM: I have read and   
reviewed the above, as well as   
discussed with Dr. Cowart,   
reviewed the medical record, and   
confirmed with the patient. I   
agree with the above.  
  
In summary Mr. Braun is a very   
pleasant 83-year-old gentleman   
with a new diagnosis of   
hepatocellular carcinoma of the   
hepatic dome. Not a surgical   
candidate given his age, cirrhosis   
and medical comorbidities.  
  
Compared and contrasted available   
nonsurgical therapies including   
radiofrequency ablation,   
embolization, and stereotactic   
body radiation. Given location in   
the dome and adjacent to the heart   
not a candidate for radiofrequency   
ablation. Patient prefers   
noninvasive approach and   
stereotactic body radiation offers   
overall excellent local control   
and favorable toxicity profile.   
Reviewed relative RBA and patient   
demonstrates understanding and   
agreement to proceed. We will   
schedule simulation. Same day CT   
chest to complete staging.   
Tentatively plan for 45 Gray in 3   
fractions.  
  
60 minutes were spent in   
consultation greater than 50% of   
which was direct counseling   
regarding treatment decisions and   
coordination of care.  
  
Marin Fish MD  
  
cc: Júnior Ahuja  
[address] [place] [address]  
Phone: [phone]  
Fax: [phone]  
  
Dr. Ortega, [facility]  
  
  
Electronically signed by Marin Fish MD at 2023 10:33   
AM EDT  
documented in this encounter            [facility]  
   
                                                    2023 Miscellaneous   
Notes                                   Formatting of this note might be   
different from the original.  
Left voicemail for patient's   
daughter about liver tumor board   
results  
See Status4 message from today  
Electronically signed by Maurizio Cochran PA-C at 2023 4:38 PM   
EDT  
documented in this encounter            [facility]  
   
                                                    2023 Miscellaneous   
Notes                                   Formatting of this note might be   
different from the original.  
Spoke to patient's daughter Dixie,   
informed her MRI is consistent   
with hepatocellular carcinoma and   
cirrhosis. Discussed need for   
referral to liver tumor board,   
hopefully this week or next  
Will need CT chest wo cont  
She has not scheduled   
EGD/colonoscopy as previously   
recommended due to wanting to see   
results of the MRI, patient has   
also been cranky recently  
  
From cirrhosis perspective, seems   
relatively well compensated but   
suspect he has some degree of   
portal HTN with thrombocytopenia.  
MELD labs ordered, will let her   
know outcome of liver tumor board   
meeting  
  
She verbalized understanding and   
thanked me for calling  
Electronically signed by Maurizio Cochran PA-C at 2023 4:36 PM   
EDT  
Formatting of this note might be   
different from the original.  
Patient's daughter Dixie called   
back, tried to transfer her to you   
but went to .  
Electronically signed by Shanna Spicer at 2023 3:41 PM EDT  
Formatting of this note might be   
different from the original.  
Attempted to call patient's   
daughter Dixie, to review MRI   
results today but had to leave   
voicemail, call back number given.  
  
If she returns my call, please let   
me know as I'd like to speak with   
her directly about the MRI   
findings. Will send [employer]   
message if unable to reach her by   
this evening as I am off tomorrow   
  
  
Maurizio Cochran PA-C  
2023 3:14 PM  
  
Electronically signed by Maurizio Cochran PA-C at 2023 3:15 PM   
EDT  
documented in this encounter            [facility]  
   
                                2023 Note                 [facility]  
   
                                2023 Note                 [facility]  
   
                                2023 Note                 [facility]  
   
                                2023 Note                 [facility]  
   
                                                    2023 History of   
Present illness Narrative               Formatting of this note might be   
different from the original.  
Radiology Service Progress Note  
  
PATIENT NAME: Mitesh Braun  
MRN: [id number]  
  
DATE OF SERVICE: 2023  
TIME: 1:01 PM  
PATIENT IDENTITY VERIFICATION   
COMPLETED USING TWO (2)   
IDENTIFIERS: Name and Date of   
Birth confirmed by patient   
verbally and Name and Date of   
Birth confirmed by identification   
band.  
FALL SCREENING: Has the patient   
had 2 falls in the last year or 1   
fall with injury or currently   
using an Ambulatory Assistive   
Device (Walker, Cane, Wheelchair,   
Crutches, etc.)? Yes, Patient High   
Risk for Falls  
What interventions were put in   
place to prevent falls during this   
visit? Patient was under   
anesthetic- no walking or   
transfer.  
PATIENT GENDER DATA: Male  
PATIENT RELEVANT IMPLANT DATA   
REVIEWED: Yes  
  
RADIOLOGY DEPARTMENT: MR; Exam(s)   
Completed: Body: Liver (routine)  
  
PERIPHERAL IV DATA: Site   
assessment: Clean,Dry and Intact,   
Site disposition Left in for next   
appointment  
  
SIGNED BY: RT Justine(R)  
2023 1:01 PM  
  
  
Electronically signed by RT Justine(MERNA) at 2023 1:03   
PM EDT  
documented in this encounter            [facility]  
   
                                                    2023 History of   
Present illness Narrative               Formatting of this note might be   
different from the original.  
Radiology Service Progress Note  
  
PATIENT NAME: Mitesh Braun  
MRN: [id number]  
  
DATE OF SERVICE: 2023  
TIME: 1:32 PM  
  
PATIENT IDENTITY VERIFICATION   
COMPLETED USING TWO (2) STANDARD   
IDENTIFIERS: Name and Date of   
Birth confirmed by patient   
verbally and Name and Date of   
Birth confirmed by identification   
band.  
PATIENT GENDER DATA: Male  
PATIENT RELEVANT IMPLANT DATA   
REVIEWED: Yes  
ALLERGIES: Reviewed and unchanged  
MEDICATIONS REVIEWED: NO  
  
IV SITE: Ambulatory: A peripheral   
IV was started in the Left   
antecubital site with a Angio   
cath: 20 gauge. and A Saline lock   
was inserted per protocol  
PERIPHERAL IV ACCESS: Discontinued  
ANXIOLYSIS/ANESTHESIA: Please see   
Outpatient Preoperative Nursing   
Care Record and Anesthesia Record   
for further details.  
  
PATIENT DISCHARGED TO: Home/Self   
Care with daughter Dixie  
SIGNED BY: Yasmine Alvarez RN  
2023 1:32 PM  
  
  
Electronically signed by Yasmine Alvarez RN at 2023 1:33 PM   
EDT  
documented in this encounter            [facility]  
   
                                        2023 Nurse Note Formatting of this  
 note is   
different from the original.  
Radiology Service Progress Note  
  
DATE OF SERVICE: 2023  
TIME: 11:20 AM  
  
PATIENT WEIGHT: 241 LBS  
PATIENT IDENTITY VERIFICATION   
COMPLETED USING TWO (2) STANDARD   
IDENTIFIERS: Name and Date of   
Birth confirmed by patient   
verbally and Name and Date of   
Birth confirmed by identification   
band.  
FALL SCREENING: Has the patient   
had 2 falls in the last year or 1   
fall with injury or currently   
using an Ambulatory Assistive   
Device (Walker, Cane, Wheelchair,   
Crutches, etc.)? No  
PATIENT GENDER DATA: Male  
ALLERGIES: Reviewed and unchanged  
CONTRAST ALLERGY: No  
  
EXAM: MRI - CONTRAST TYPE: GROUP   
II  
IV SITE: Ambulatory: A peripheral   
IV was started in the Left   
antecubital site with a Angio   
cath: 20 gauge. and A Saline lock   
was inserted per protocol  
IV SITE APPEARANCE: Clean,Dry and   
Intact  
  
SIGNATURE: Zakiya Rojas RN   
PATIENT NAME: Mitesh Braun  
DATE: 2023 MRN: [id number]  
TIME: 11:20 AM  
  
Radiology Service Progress Note  
  
PATIENT NAME: Mitesh Braun  
MRN: [id number]  
  
DATE OF SERVICE: 2023  
TIME: 11:20 AM  
  
PATIENT IDENTITY VERIFICATION   
COMPLETED USING TWO (2) STANDARD   
IDENTIFIERS: Name and Date of   
Birth confirmed by patient   
verbally and Name and Date of   
Birth confirmed by identification   
band.  
PATIENT GENDER DATA: Male  
PATIENT RELEVANT IMPLANT DATA   
REVIEWED: Yes  
ALLERGIES: Reviewed and unchanged  
MEDICATIONS REVIEWED: YES  
  
IV SITE: Ambulatory: A peripheral   
IV was started in the Left   
antecubital site with a Angio   
cath: 20 gauge. and A Saline lock   
was inserted per protocol  
PERIPHERAL IV ACCESS: Discontinued  
ANXIOLYSIS/ANESTHESIA: Please see   
Outpatient Preoperative Nursing   
Care Record and Anesthesia Record   
for further details.  
  
PATIENT DISCHARGED TO: Home/Self   
Care  
SIGNED BY: Zakiya Rojas RN  
2023 11:20 AM  
  
  
Electronically signed by Zakiya Rojas RN at 2023 11:21   
AM EDT  
documented in this encounter            [facility]  
   
                                                    2023 Miscellaneous   
Notes                                   Formatting of this note might be   
different from the original.  
Radiology Service Pre Anesthesia   
Telephone Call  
  
PATIENT NAME: Mitesh Braun  
MRN: [id number]  
  
DATE OF CALL: 2023  
TIME: 4:13 PM  
  
PATIENT TYPE: Adult patient 1. Has   
the patient ever had an MRI scan   
before? Yes, claustrophobia  
2. Does the patient have any   
implanted devices? No If yes,   
notify patient that additional   
follow up will be required.,  
3. Has the patient had a history   
and physical within this 30 day   
period? Yes.  
4. Diet instructions given. Yes.   
No solids for 8 hours prior to   
exam. Patient may take medications   
with sips of water.,  
5. Has the patient had lab work   
within the last 6 months? Yes,  
6. Is the patient diabetic? No,  
7. Is the patient taking pain   
medication? No,  
8. Parking and Check-in   
instructions given? Yes,  
9. Does the patient have a    
to take them home? Yes,  
10. Spoke with patient: No, spoke   
with Oscar, and All questions   
were addressed and answered.   
Patient's daughter verbalized   
understanding.  
  
SIGNED BY: Dotty Arias RN  
2023 4:13 PM  
  
  
Electronically signed by Dotty Arias RN at 2023 4:24 PM   
EDT  
documented in this encounter            [facility]  
  
  
  
                                          
   
                                          
   
                                          
   
                                          
   
                                          
   
                                          
   
                                          
   
                                          
   
                                          
   
                                          
   
                                          
   
                                          
   
                                          
   
                                          
   
                                          
   
                                          
   
                                          
   
                                          
   
                                          
   
                                          
   
                                          
   
                                          
   
                                          
   
                                          
   
                                          
   
                                          
   
                                          
   
                                          
   
                                          
   
                                          
   
                                          
   
                                          
   
                                          
   
                                          
  
documented as of this encounter (statuses as of 2023)  
[facility]03- History of Past illness Narrative*   
  
                          Problem      Noted Date   Diagnosed Date Resolved Date  
   
                          Stasis ulcer 2023  
   
                          Obesity, Class II, BMI 35-39.9 2021  
   
                          Medicare annual wellness visit, subsequent 2019  
   
                                                    Encounter for long-term (cur  
rent) use of   
medications         2018  
   
                                                      
  
  
Overview:Formatting of this note might be different from the original.  
Added automatically from request for surgery [id number]  
   
   
                          History of colonic polyps 2018  
   
                                                      
  
  
Overview:Formatting of this note might be different from the original.  
Added automatically from request for surgery [id number]  
   
   
                          Gastroesophageal reflux disease 2018  
   
                                                      
  
  
Overview:Formatting of this note might be different from the original.  
Added automatically from request for surgery [id number]  
   
   
                          Acute pulmonary edema 10/05/2011                10/06/  
2011  
   
                                                      
  
  
Overview:Formatting of this note might be different from the original.  
10/5/2011  
cardiogenic and noncardiogenic factors  
  
   
   
                          Atelectasis  10/05/2011                10/06/2011  
   
                          Hypotension  10/04/2011                10/06/2011  
   
                                                      
  
  
Overview:Formatting of this note might be different from the original.  
10/4/2011  
goal map 65-80  
   
   
                          Stress hyperglycemia 10/04/2011                10/06/2  
011  
   
                                                      
  
  
Overview:Formatting of this note might be different from the original.  
10/4/2011  
Perioperative insulin resistance and exacerbation of hyperglycemia.  
Control blood glucose with titration of insulin infusion  
  
10/5/2011  
adequate control  
goal FBG<180  
  
   
   
                          Post-op pain 10/04/2011                10/06/2011  
   
                          Mechanically assisted ventilation 10/04/2011            
      10/05/2011  
   
                                                      
  
  
Overview:Formatting of this note might be different from the original.  
10/4/2011  
optimize MV settings, WTE  
current setting:FiO2, 75%, peep 8  
   
   
                          Cardiac insufficiency 10/04/2011                10/05/  
2011  
   
                                                      
  
  
Overview:Formatting of this note might be different from the original.  
10/4/2011  
RV mild to moderate post pump  
goal CI>2.3  
   
   
                          Hypoxemia    10/04/2011                10/06/2011  
   
                          discharge planning 2011                10/19/201  
1  
   
                                                      
  
  
Overview:Formatting of this note might be different from the original.  
age 71,  lives in [place], [place]. No DC needs.  
/  
   
   
                          Pre-op testing 2011                10/04/2011  
   
                                                      
  
  
Overview:Formatting of this note is different from the original.  
Images from the original note were not included.  
[facility]  
PRE-OP CHECKLIST  
  
Surgeon: Deangelo Angulo M.D. Informed Consent Completed: No  
STS Score: 1.4%  
CAD: Yes - CAD on Problem List: Yes  
Is intended procedure a CABG: Yes - is a beta blocker ordered? Yes  
  
H & P completed: Yes  
  
PA/LAT: Completed CT: Completed MRI: N/A LE US: N/A  
  
Cath: Yes - reviewed: Yes Echo:Completed EKG: Completed EF %: 61  
  
PI's: N/A Carotid: Completed Mapping: N/A Dental: Completed PFT's: N/A  
  
Basename 11 0729  
WBC 7.18  
HB 13.1  
HCT 41.8  
  
INR 1.0  
CREAT 1.35  
  
UA: Normal  
HCG:N/A  
  
ABO/ABO Confirmed: Yes  
  
Blood ordered: No  
  
SA Swab: Yes - results: Pending  
  
Last Dose of Anticoagulation: vitamins  
  
Op Note: N/A  
  
Pacemaker Check: N/A  
  
Consults: GI 2001  
  
DM: No  
  
Cardiac Surgical prep: No  
  
SIGNATURE: Krystal Castellanos RN CHECKED BY:  
DATE of SERVICE: 2011  
TIME of SERVICE: 2:23 PM  
  
  
   
   
                          Anemia of chronic disease 2011  
   
                          Intestinal polyp 2011  
   
                                                      
  
  
Overview:Formatting of this note might be different from the original.  
Distal ileum ulcerated polyp.  
   
   
                                                    Nonspecific abnormal results  
 of liver   
function study      2006  
   
                          Impotence of organic origin 2006  
   
                                                    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  
9.9 (morbid   
obesity)                                                    2021  
  
documented as of this encounter (statuses as of 2023)  
[facility]03- History of Past illness Narrative*   
  
                          Problem      Noted Date   Diagnosed Date Resolved Date  
   
                          Stasis ulcer 2023  
   
                          Obesity, Class II, BMI 35-39.9 2021  
   
                          Medicare annual wellness visit, subsequent 2019  
   
                                                    Encounter for long-term (cur  
rent) use of   
medications         2018  
   
                                                      
  
  
Overview:Formatting of this note might be different from the original.  
Added automatically from request for surgery [id number]  
   
   
                          History of colonic polyps 2018  
   
                                                      
  
  
Overview:Formatting of this note might be different from the original.  
Added automatically from request for surgery [id number]  
   
   
                          Gastroesophageal reflux disease 2018  
   
                                                      
  
  
Overview:Formatting of this note might be different from the original.  
Added automatically from request for surgery [id number]  
   
   
                          Acute pulmonary edema 10/05/2011                10/06/  
2011  
   
                                                      
  
  
Overview:Formatting of this note might be different from the original.  
10/5/2011  
cardiogenic and noncardiogenic factors  
  
   
   
                          Atelectasis  10/05/2011                10/06/2011  
   
                          Hypotension  10/04/2011                10/06/2011  
   
                                                      
  
  
Overview:Formatting of this note might be different from the original.  
10/4/2011  
goal map 65-80  
   
   
                          Stress hyperglycemia 10/04/2011                10/06/2  
011  
   
                                                      
  
  
Overview:Formatting of this note might be different from the original.  
10/4/2011  
Perioperative insulin resistance and exacerbation of hyperglycemia.  
Control blood glucose with titration of insulin infusion  
  
10/5/2011  
adequate control  
goal FBG<180  
  
   
   
                          Post-op pain 10/04/2011                10/06/2011  
   
                          Mechanically assisted ventilation 10/04/2011            
      10/05/2011  
   
                                                      
  
  
Overview:Formatting of this note might be different from the original.  
10/4/2011  
optimize MV settings, WTE  
current setting:FiO2, 75%, peep 8  
   
   
                          Cardiac insufficiency 10/04/2011                10/05/  
2011  
   
                                                      
  
  
Overview:Formatting of this note might be different from the original.  
10/4/2011  
RV mild to moderate post pump  
goal CI>2.3  
   
   
                          Hypoxemia    10/04/2011                10/06/2011  
   
                          discharge planning 2011                10/19/201  
1  
   
                                                      
  
  
Overview:Formatting of this note might be different from the original.  
age 71,  lives in [place], [place]. No DC needs.  
/  
   
   
                          Pre-op testing 2011                10/04/2011  
   
                                                      
  
  
Overview:Formatting of this note is different from the original.  
Images from the original note were not included.  
[facility]  
PRE-OP CHECKLIST  
  
Surgeon: Deangelo Angulo M.D. Informed Consent Completed: No  
STS Score: 1.4%  
CAD: Yes - CAD on Problem List: Yes  
Is intended procedure a CABG: Yes - is a beta blocker ordered? Yes  
  
H & P completed: Yes  
  
PA/LAT: Completed CT: Completed MRI: N/A LE US: N/A  
  
Cath: Yes - reviewed: Yes Echo:Completed EKG: Completed EF %: 61  
  
PI's: N/A Carotid: Completed Mapping: N/A Dental: Completed PFT's: N/A  
  
Basename 11 0729  
WBC 7.18  
HB 13.1  
HCT 41.8  
  
INR 1.0  
CREAT 1.35  
  
UA: Normal  
HCG:N/A  
  
ABO/ABO Confirmed: Yes  
  
Blood ordered: No  
  
SA Swab: Yes - results: Pending  
  
Last Dose of Anticoagulation: vitamins  
  
Op Note: N/A  
  
Pacemaker Check: N/A  
  
Consults: GI 2001  
  
DM: No  
  
Cardiac Surgical prep: No  
  
SIGNATURE: Krystal Castellanos RN CHECKED BY:  
DATE of SERVICE: 2011  
TIME of SERVICE: 2:23 PM  
  
  
   
   
                          Anemia of chronic disease 2011  
   
                          Intestinal polyp 2011  
   
                                                      
  
  
Overview:Formatting of this note might be different from the original.  
Distal ileum ulcerated polyp.  
   
   
                                                    Nonspecific abnormal results  
 of liver   
function study      2006  
   
                          Impotence of organic origin 2006  
   
                                                    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  
9.9 (morbid   
obesity)                                                    2021  
  
documented as of this encounter (statuses as of 2023)  
[facility]03- History of Past illness Narrative*   
  
                          Problem      Noted Date   Diagnosed Date Resolved Date  
   
                          Stasis ulcer 2023  
   
                          Obesity, Class II, BMI 35-39.9 2021  
   
                          Medicare annual wellness visit, subsequent 2019  
   
                                                    Encounter for long-term (cur  
rent) use of   
medications         2018  
   
                                                      
  
  
Overview:Formatting of this note might be different from the original.  
Added automatically from request for surgery [id number]  
   
   
                          History of colonic polyps 2018  
   
                                                      
  
  
Overview:Formatting of this note might be different from the original.  
Added automatically from request for surgery [id number]  
   
   
                          Gastroesophageal reflux disease 2018  
   
                                                      
  
  
Overview:Formatting of this note might be different from the original.  
Added automatically from request for surgery [id number]  
   
   
                          Acute pulmonary edema 10/05/2011                10/06/  
2011  
   
                                                      
  
  
Overview:Formatting of this note might be different from the original.  
10/5/2011  
cardiogenic and noncardiogenic factors  
  
   
   
                          Atelectasis  10/05/2011                10/06/2011  
   
                          Hypotension  10/04/2011                10/06/2011  
   
                                                      
  
  
Overview:Formatting of this note might be different from the original.  
10/4/2011  
goal map 65-80  
   
   
                          Stress hyperglycemia 10/04/2011                10/06/2  
011  
   
                                                      
  
  
Overview:Formatting of this note might be different from the original.  
10/4/2011  
Perioperative insulin resistance and exacerbation of hyperglycemia.  
Control blood glucose with titration of insulin infusion  
  
10/5/2011  
adequate control  
goal FBG<180  
  
   
   
                          Post-op pain 10/04/2011                10/06/2011  
   
                          Mechanically assisted ventilation 10/04/2011            
      10/05/2011  
   
                                                      
  
  
Overview:Formatting of this note might be different from the original.  
10/4/2011  
optimize MV settings, WTE  
current setting:FiO2, 75%, peep 8  
   
   
                          Cardiac insufficiency 10/04/2011                10/05/  
2011  
   
                                                      
  
  
Overview:Formatting of this note might be different from the original.  
10/4/2011  
RV mild to moderate post pump  
goal CI>2.3  
   
   
                          Hypoxemia    10/04/2011                10/06/2011  
   
                          discharge planning 2011                10/19/201  
1  
   
                                                      
  
  
Overview:Formatting of this note might be different from the original.  
age 71,  lives in [place], [place]. No DC needs.  
/  
   
   
                          Pre-op testing 2011                10/04/2011  
   
                                                      
  
  
Overview:Formatting of this note is different from the original.  
Images from the original note were not included.  
[facility]  
PRE-OP CHECKLIST  
  
Surgeon: Deangelo Angulo M.D. Informed Consent Completed: No  
STS Score: 1.4%  
CAD: Yes - CAD on Problem List: Yes  
Is intended procedure a CABG: Yes - is a beta blocker ordered? Yes  
  
H & P completed: Yes  
  
PA/LAT: Completed CT: Completed MRI: N/A LE US: N/A  
  
Cath: Yes - reviewed: Yes Echo:Completed EKG: Completed EF %: 61  
  
PI's: N/A Carotid: Completed Mapping: N/A Dental: Completed PFT's: N/A  
  
Basename 11 0729  
WBC 7.18  
HB 13.1  
HCT 41.8  
  
INR 1.0  
CREAT 1.35  
  
UA: Normal  
HCG:N/A  
  
ABO/ABO Confirmed: Yes  
  
Blood ordered: No  
  
SA Swab: Yes - results: Pending  
  
Last Dose of Anticoagulation: vitamins  
  
Op Note: N/A  
  
Pacemaker Check: N/A  
  
Consults: GI 2001  
  
DM: No  
  
Cardiac Surgical prep: No  
  
SIGNATURE: Krystal Castellanos RN CHECKED BY:  
DATE of SERVICE: 2011  
TIME of SERVICE: 2:23 PM  
  
  
   
   
                          Anemia of chronic disease 2011  
   
                          Intestinal polyp 2011  
   
                                                      
  
  
Overview:Formatting of this note might be different from the original.  
Distal ileum ulcerated polyp.  
   
   
                                                    Nonspecific abnormal results  
 of liver   
function study      2006  
   
                          Impotence of organic origin 2006  
   
                                                    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  
9.9 (morbid   
obesity)                                                    2021  
  
documented as of this encounter (statuses as of 2023)  
[facility]03- History of Past illness Narrative*   
  
                          Problem      Noted Date   Diagnosed Date Resolved Date  
   
                          Stasis ulcer 2023  
   
                          Obesity, Class II, BMI 35-39.9 2021  
   
                          Medicare annual wellness visit, subsequent 2019  
   
                                                    Encounter for long-term (cur  
rent) use of   
medications         2018  
   
                                                      
  
  
Overview:Formatting of this note might be different from the original.  
Added automatically from request for surgery [id number]  
   
   
                          History of colonic polyps 2018  
   
                                                      
  
  
Overview:Formatting of this note might be different from the original.  
Added automatically from request for surgery [id number]  
   
   
                          Gastroesophageal reflux disease 2018  
   
                                                      
  
  
Overview:Formatting of this note might be different from the original.  
Added automatically from request for surgery [id number]  
   
   
                          Acute pulmonary edema 10/05/2011                10/06/  
2011  
   
                                                      
  
  
Overview:Formatting of this note might be different from the original.  
10/5/2011  
cardiogenic and noncardiogenic factors  
  
   
   
                          Atelectasis  10/05/2011                10/06/2011  
   
                          Hypotension  10/04/2011                10/06/2011  
   
                                                      
  
  
Overview:Formatting of this note might be different from the original.  
10/4/2011  
goal map 65-80  
   
   
                          Stress hyperglycemia 10/04/2011                10/06/2  
011  
   
                                                      
  
  
Overview:Formatting of this note might be different from the original.  
10/4/2011  
Perioperative insulin resistance and exacerbation of hyperglycemia.  
Control blood glucose with titration of insulin infusion  
  
10/5/2011  
adequate control  
goal FBG<180  
  
   
   
                          Post-op pain 10/04/2011                10/06/2011  
   
                          Mechanically assisted ventilation 10/04/2011            
      10/05/2011  
   
                                                      
  
  
Overview:Formatting of this note might be different from the original.  
10/4/2011  
optimize MV settings, WTE  
current setting:FiO2, 75%, peep 8  
   
   
                          Cardiac insufficiency 10/04/2011                10/05/  
2011  
   
                                                      
  
  
Overview:Formatting of this note might be different from the original.  
10/4/2011  
RV mild to moderate post pump  
goal CI>2.3  
   
   
                          Hypoxemia    10/04/2011                10/06/2011  
   
                          discharge planning 2011                10/19/201  
1  
   
                                                      
  
  
Overview:Formatting of this note might be different from the original.  
age 71,  lives in [place], [place]. No DC needs.  
/  
   
   
                          Pre-op testing 2011                10/04/2011  
   
                                                      
  
  
Overview:Formatting of this note is different from the original.  
Images from the original note were not included.  
[facility]  
PRE-OP CHECKLIST  
  
Surgeon: Daengelo Angulo M.D. Informed Consent Completed: No  
STS Score: 1.4%  
CAD: Yes - CAD on Problem List: Yes  
Is intended procedure a CABG: Yes - is a beta blocker ordered? Yes  
  
H & P completed: Yes  
  
PA/LAT: Completed CT: Completed MRI: N/A LE US: N/A  
  
Cath: Yes - reviewed: Yes Echo:Completed EKG: Completed EF %: 61  
  
PI's: N/A Carotid: Completed Mapping: N/A Dental: Completed PFT's: N/A  
  
Basename 11 0729  
WBC 7.18  
HB 13.1  
HCT 41.8  
  
INR 1.0  
CREAT 1.35  
  
UA: Normal  
HCG:N/A  
  
ABO/ABO Confirmed: Yes  
  
Blood ordered: No  
  
SA Swab: Yes - results: Pending  
  
Last Dose of Anticoagulation: vitamins  
  
Op Note: N/A  
  
Pacemaker Check: N/A  
  
Consults: GI 2001  
  
DM: No  
  
Cardiac Surgical prep: No  
  
SIGNATURE: Krystal Castellanos RN CHECKED BY:  
DATE of SERVICE: 2011  
TIME of SERVICE: 2:23 PM  
  
  
   
   
                          Anemia of chronic disease 2011  
   
                          Intestinal polyp 2011  
   
                                                      
  
  
Overview:Formatting of this note might be different from the original.  
Distal ileum ulcerated polyp.  
   
   
                                                    Nonspecific abnormal results  
 of liver   
function study      2006  
   
                          Impotence of organic origin 2006  
   
                                                    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  
9.9 (morbid   
obesity)                                                    2021  
  
documented as of this encounter (statuses as of 2023)  
[facility]03- History of Past illness Narrative*   
  
                          Problem      Noted Date   Diagnosed Date Resolved Date  
   
                          Stasis ulcer 2023  
   
                          Obesity, Class II, BMI 35-39.9 2021  
   
                          Medicare annual wellness visit, subsequent 2019  
   
                                                    Encounter for long-term (cur  
rent) use of   
medications         2018  
   
                                                      
  
  
Overview:Formatting of this note might be different from the original.  
Added automatically from request for surgery [id number]  
   
   
                          History of colonic polyps 2018  
   
                                                      
  
  
Overview:Formatting of this note might be different from the original.  
Added automatically from request for surgery [id number]  
   
   
                          Gastroesophageal reflux disease 2018  
   
                                                      
  
  
Overview:Formatting of this note might be different from the original.  
Added automatically from request for surgery [id number]  
   
   
                          Acute pulmonary edema 10/05/2011                10/06/  
2011  
   
                                                      
  
  
Overview:Formatting of this note might be different from the original.  
10/5/2011  
cardiogenic and noncardiogenic factors  
  
   
   
                          Atelectasis  10/05/2011                10/06/2011  
   
                          Hypotension  10/04/2011                10/06/2011  
   
                                                      
  
  
Overview:Formatting of this note might be different from the original.  
10/4/2011  
goal map 65-80  
   
   
                          Stress hyperglycemia 10/04/2011                10/06/2  
011  
   
                                                      
  
  
Overview:Formatting of this note might be different from the original.  
10/4/2011  
Perioperative insulin resistance and exacerbation of hyperglycemia.  
Control blood glucose with titration of insulin infusion  
  
10/5/2011  
adequate control  
goal FBG<180  
  
   
   
                          Post-op pain 10/04/2011                10/06/2011  
   
                          Mechanically assisted ventilation 10/04/2011            
      10/05/2011  
   
                                                      
  
  
Overview:Formatting of this note might be different from the original.  
10/4/2011  
optimize MV settings, WTE  
current setting:FiO2, 75%, peep 8  
   
   
                          Cardiac insufficiency 10/04/2011                10/05/  
2011  
   
                                                      
  
  
Overview:Formatting of this note might be different from the original.  
10/4/2011  
RV mild to moderate post pump  
goal CI>2.3  
   
   
                          Hypoxemia    10/04/2011                10/06/2011  
   
                          discharge planning 2011                10/19/201  
1  
   
                                                      
  
  
Overview:Formatting of this note might be different from the original.  
age 71,  lives in [place], [place]. No DC needs.  
/  
   
   
                          Pre-op testing 2011                10/04/2011  
   
                                                      
  
  
Overview:Formatting of this note is different from the original.  
Images from the original note were not included.  
[facility]  
PRE-OP CHECKLIST  
  
Surgeon: Deangelo Angulo M.D. Informed Consent Completed: No  
STS Score: 1.4%  
CAD: Yes - CAD on Problem List: Yes  
Is intended procedure a CABG: Yes - is a beta blocker ordered? Yes  
  
H & P completed: Yes  
  
PA/LAT: Completed CT: Completed MRI: N/A LE US: N/A  
  
Cath: Yes - reviewed: Yes Echo:Completed EKG: Completed EF %: 61  
  
PI's: N/A Carotid: Completed Mapping: N/A Dental: Completed PFT's: N/A  
  
Basename 11 0729  
WBC 7.18  
HB 13.1  
HCT 41.8  
  
INR 1.0  
CREAT 1.35  
  
UA: Normal  
HCG:N/A  
  
ABO/ABO Confirmed: Yes  
  
Blood ordered: No  
  
SA Swab: Yes - results: Pending  
  
Last Dose of Anticoagulation: vitamins  
  
Op Note: N/A  
  
Pacemaker Check: N/A  
  
Consults: GI 2001  
  
DM: No  
  
Cardiac Surgical prep: No  
  
SIGNATURE: Krystal Castellanos RN CHECKED BY:  
DATE of SERVICE: 2011  
TIME of SERVICE: 2:23 PM  
  
  
   
   
                          Anemia of chronic disease 2011  
   
                          Intestinal polyp 2011  
   
                                                      
  
  
Overview:Formatting of this note might be different from the original.  
Distal ileum ulcerated polyp.  
   
   
                                                    Nonspecific abnormal results  
 of liver   
function study      2006  
   
                          Impotence of organic origin 2006  
   
                                                    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  
9.9 (morbid   
obesity)                                                    2021  
  
documented as of this encounter (statuses as of 2023)  
[facility]03- History of Past illness Narrative*   
  
                          Problem      Noted Date   Diagnosed Date Resolved Date  
   
                          Stasis ulcer 2023  
   
                          Obesity, Class II, BMI 35-39.9 2021  
   
                          Medicare annual wellness visit, subsequent 2019  
   
                                                    Encounter for long-term (cur  
rent) use of   
medications         2018  
   
                                                      
  
  
Overview:Formatting of this note might be different from the original.  
Added automatically from request for surgery [id number]  
   
   
                          History of colonic polyps 2018  
   
                                                      
  
  
Overview:Formatting of this note might be different from the original.  
Added automatically from request for surgery [id number]  
   
   
                          Gastroesophageal reflux disease 2018  
   
                                                      
  
  
Overview:Formatting of this note might be different from the original.  
Added automatically from request for surgery [id number]  
   
   
                          Acute pulmonary edema 10/05/2011                10/06/  
2011  
   
                                                      
  
  
Overview:Formatting of this note might be different from the original.  
10/5/2011  
cardiogenic and noncardiogenic factors  
  
   
   
                          Atelectasis  10/05/2011                10/06/2011  
   
                          Hypotension  10/04/2011                10/06/2011  
   
                                                      
  
  
Overview:Formatting of this note might be different from the original.  
10/4/2011  
goal map 65-80  
   
   
                          Stress hyperglycemia 10/04/2011                10/06/2  
011  
   
                                                      
  
  
Overview:Formatting of this note might be different from the original.  
10/4/2011  
Perioperative insulin resistance and exacerbation of hyperglycemia.  
Control blood glucose with titration of insulin infusion  
  
10/5/2011  
adequate control  
goal FBG<180  
  
   
   
                          Post-op pain 10/04/2011                10/06/2011  
   
                          Mechanically assisted ventilation 10/04/2011            
      10/05/2011  
   
                                                      
  
  
Overview:Formatting of this note might be different from the original.  
10/4/2011  
optimize MV settings, WTE  
current setting:FiO2, 75%, peep 8  
   
   
                          Cardiac insufficiency 10/04/2011                10/05/  
2011  
   
                                                      
  
  
Overview:Formatting of this note might be different from the original.  
10/4/2011  
RV mild to moderate post pump  
goal CI>2.3  
   
   
                          Hypoxemia    10/04/2011                10/06/2011  
   
                          discharge planning 2011                10/19/201  
1  
   
                                                      
  
  
Overview:Formatting of this note might be different from the original.  
age 71,  lives in [place], [place]. No DC needs.  
/  
   
   
                          Pre-op testing 2011                10/04/2011  
   
                                                      
  
  
Overview:Formatting of this note is different from the original.  
Images from the original note were not included.  
[facility]  
PRE-OP CHECKLIST  
  
Surgeon: Deangelo Angulo M.D. Informed Consent Completed: No  
STS Score: 1.4%  
CAD: Yes - CAD on Problem List: Yes  
Is intended procedure a CABG: Yes - is a beta blocker ordered? Yes  
  
H & P completed: Yes  
  
PA/LAT: Completed CT: Completed MRI: N/A LE US: N/A  
  
Cath: Yes - reviewed: Yes Echo:Completed EKG: Completed EF %: 61  
  
PI's: N/A Carotid: Completed Mapping: N/A Dental: Completed PFT's: N/A  
  
Basename 11 0729  
WBC 7.18  
HB 13.1  
HCT 41.8  
  
INR 1.0  
CREAT 1.35  
  
UA: Normal  
HCG:N/A  
  
ABO/ABO Confirmed: Yes  
  
Blood ordered: No  
  
SA Swab: Yes - results: Pending  
  
Last Dose of Anticoagulation: vitamins  
  
Op Note: N/A  
  
Pacemaker Check: N/A  
  
Consults: GI 2001  
  
DM: No  
  
Cardiac Surgical prep: No  
  
SIGNATURE: Krystal Castellanos RN CHECKED BY:  
DATE of SERVICE: 2011  
TIME of SERVICE: 2:23 PM  
  
  
   
   
                          Anemia of chronic disease 2011  
   
                          Intestinal polyp 2011  
   
                                                      
  
  
Overview:Formatting of this note might be different from the original.  
Distal ileum ulcerated polyp.  
   
   
                                                    Nonspecific abnormal results  
 of liver   
function study      2006  
   
                          Impotence of organic origin 2006  
   
                                                    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  
9.9 (morbid   
obesity)                                                    2021  
  
documented as of this encounter (statuses as of 2023)  
[facility]03- History of Past illness Narrative*   
  
                          Problem      Noted Date   Diagnosed Date Resolved Date  
   
                          Stasis ulcer 2023  
   
                          Obesity, Class II, BMI 35-39.9 2021  
   
                          Medicare annual wellness visit, subsequent 2019  
   
                                                    Encounter for long-term (cur  
rent) use of   
medications         2018  
   
                                                      
  
  
Overview:Formatting of this note might be different from the original.  
Added automatically from request for surgery [id number]  
   
   
                          History of colonic polyps 2018  
   
                                                      
  
  
Overview:Formatting of this note might be different from the original.  
Added automatically from request for surgery [id number]  
   
   
                          Gastroesophageal reflux disease 2018  
   
                                                      
  
  
Overview:Formatting of this note might be different from the original.  
Added automatically from request for surgery [id number]  
   
   
                          Acute pulmonary edema 10/05/2011                10/06/  
2011  
   
                                                      
  
  
Overview:Formatting of this note might be different from the original.  
10/5/2011  
cardiogenic and noncardiogenic factors  
  
   
   
                          Atelectasis  10/05/2011                10/06/2011  
   
                          Hypotension  10/04/2011                10/06/2011  
   
                                                      
  
  
Overview:Formatting of this note might be different from the original.  
10/4/2011  
goal map 65-80  
   
   
                          Stress hyperglycemia 10/04/2011                10/06/2  
011  
   
                                                      
  
  
Overview:Formatting of this note might be different from the original.  
10/4/2011  
Perioperative insulin resistance and exacerbation of hyperglycemia.  
Control blood glucose with titration of insulin infusion  
  
10/5/2011  
adequate control  
goal FBG<180  
  
   
   
                          Post-op pain 10/04/2011                10/06/2011  
   
                          Mechanically assisted ventilation 10/04/2011            
      10/05/2011  
   
                                                      
  
  
Overview:Formatting of this note might be different from the original.  
10/4/2011  
optimize MV settings, WTE  
current setting:FiO2, 75%, peep 8  
   
   
                          Cardiac insufficiency 10/04/2011                10/05/  
2011  
   
                                                      
  
  
Overview:Formatting of this note might be different from the original.  
10/4/2011  
RV mild to moderate post pump  
goal CI>2.3  
   
   
                          Hypoxemia    10/04/2011                10/06/2011  
   
                          discharge planning 2011                10/19/201  
1  
   
                                                      
  
  
Overview:Formatting of this note might be different from the original.  
age 71,  lives in [place], [place]. No DC needs.  
/  
   
   
                          Pre-op testing 2011                10/04/2011  
   
                                                      
  
  
Overview:Formatting of this note is different from the original.  
Images from the original note were not included.  
[facility]  
PRE-OP CHECKLIST  
  
Surgeon: Deangelo Angulo M.D. Informed Consent Completed: No  
STS Score: 1.4%  
CAD: Yes - CAD on Problem List: Yes  
Is intended procedure a CABG: Yes - is a beta blocker ordered? Yes  
  
H & P completed: Yes  
  
PA/LAT: Completed CT: Completed MRI: N/A LE US: N/A  
  
Cath: Yes - reviewed: Yes Echo:Completed EKG: Completed EF %: 61  
  
PI's: N/A Carotid: Completed Mapping: N/A Dental: Completed PFT's: N/A  
  
Basename 11 0729  
WBC 7.18  
HB 13.1  
HCT 41.8  
  
INR 1.0  
CREAT 1.35  
  
UA: Normal  
HCG:N/A  
  
ABO/ABO Confirmed: Yes  
  
Blood ordered: No  
  
SA Swab: Yes - results: Pending  
  
Last Dose of Anticoagulation: vitamins  
  
Op Note: N/A  
  
Pacemaker Check: N/A  
  
Consults: GI 2001  
  
DM: No  
  
Cardiac Surgical prep: No  
  
SIGNATURE: Krystal Castellanos RN CHECKED BY:  
DATE of SERVICE: 2011  
TIME of SERVICE: 2:23 PM  
  
  
   
   
                          Anemia of chronic disease 2011  
   
                          Intestinal polyp 2011  
   
                                                      
  
  
Overview:Formatting of this note might be different from the original.  
Distal ileum ulcerated polyp.  
   
   
                                                    Nonspecific abnormal results  
 of liver   
function study      2006  
   
                          Impotence of organic origin 2006  
   
                                                    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  
9.9 (morbid   
obesity)                                                    2021  
  
documented as of this encounter (statuses as of 2023)  
[facility]03- History of Past illness Narrative*   
  
                          Problem      Noted Date   Diagnosed Date Resolved Date  
   
                          Stasis ulcer 2023  
   
                          Obesity, Class II, BMI 35-39.9 2021  
   
                          Medicare annual wellness visit, subsequent 2019  
   
                                                    Encounter for long-term (cur  
rent) use of   
medications         2018  
   
                                                      
  
  
Overview:Formatting of this note might be different from the original.  
Added automatically from request for surgery [id number]  
   
   
                          History of colonic polyps 2018  
   
                                                      
  
  
Overview:Formatting of this note might be different from the original.  
Added automatically from request for surgery [id number]  
   
   
                          Gastroesophageal reflux disease 2018  
   
                                                      
  
  
Overview:Formatting of this note might be different from the original.  
Added automatically from request for surgery [id number]  
   
   
                          Acute pulmonary edema 10/05/2011                10/06/  
2011  
   
                                                      
  
  
Overview:Formatting of this note might be different from the original.  
10/5/2011  
cardiogenic and noncardiogenic factors  
  
   
   
                          Atelectasis  10/05/2011                10/06/2011  
   
                          Hypotension  10/04/2011                10/06/2011  
   
                                                      
  
  
Overview:Formatting of this note might be different from the original.  
10/4/2011  
goal map 65-80  
   
   
                          Stress hyperglycemia 10/04/2011                10/06/2  
011  
   
                                                      
  
  
Overview:Formatting of this note might be different from the original.  
10/4/2011  
Perioperative insulin resistance and exacerbation of hyperglycemia.  
Control blood glucose with titration of insulin infusion  
  
10/5/2011  
adequate control  
goal FBG<180  
  
   
   
                          Post-op pain 10/04/2011                10/06/2011  
   
                          Mechanically assisted ventilation 10/04/2011            
      10/05/2011  
   
                                                      
  
  
Overview:Formatting of this note might be different from the original.  
10/4/2011  
optimize MV settings, WTE  
current setting:FiO2, 75%, peep 8  
   
   
                          Cardiac insufficiency 10/04/2011                10/05/  
2011  
   
                                                      
  
  
Overview:Formatting of this note might be different from the original.  
10/4/2011  
RV mild to moderate post pump  
goal CI>2.3  
   
   
                          Hypoxemia    10/04/2011                10/06/2011  
   
                          discharge planning 2011                10/19/201  
1  
   
                                                      
  
  
Overview:Formatting of this note might be different from the original.  
age 71,  lives in [place], [place]. No DC needs.  
/  
   
   
                          Pre-op testing 2011                10/04/2011  
   
                                                      
  
  
Overview:Formatting of this note is different from the original.  
Images from the original note were not included.  
[facility]  
PRE-OP CHECKLIST  
  
Surgeon: Deangelo Angulo M.D. Informed Consent Completed: No  
STS Score: 1.4%  
CAD: Yes - CAD on Problem List: Yes  
Is intended procedure a CABG: Yes - is a beta blocker ordered? Yes  
  
H & P completed: Yes  
  
PA/LAT: Completed CT: Completed MRI: N/A LE US: N/A  
  
Cath: Yes - reviewed: Yes Echo:Completed EKG: Completed EF %: 61  
  
PI's: N/A Carotid: Completed Mapping: N/A Dental: Completed PFT's: N/A  
  
Basename 11 0729  
WBC 7.18  
HB 13.1  
HCT 41.8  
  
INR 1.0  
CREAT 1.35  
  
UA: Normal  
HCG:N/A  
  
ABO/ABO Confirmed: Yes  
  
Blood ordered: No  
  
SA Swab: Yes - results: Pending  
  
Last Dose of Anticoagulation: vitamins  
  
Op Note: N/A  
  
Pacemaker Check: N/A  
  
Consults: GI 2001  
  
DM: No  
  
Cardiac Surgical prep: No  
  
SIGNATURE: Krystal Castellanos RN CHECKED BY:  
DATE of SERVICE: 2011  
TIME of SERVICE: 2:23 PM  
  
  
   
   
                          Anemia of chronic disease 2011  
   
                          Intestinal polyp 2011  
   
                                                      
  
  
Overview:Formatting of this note might be different from the original.  
Distal ileum ulcerated polyp.  
   
   
                                                    Nonspecific abnormal results  
 of liver   
function study      2006  
   
                          Impotence of organic origin 2006  
   
                                                    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  
9.9 (morbid   
obesity)                                                    2021  
  
documented as of this encounter (statuses as of 2023)  
[facility]03- History of Past illness Narrative*   
  
                          Problem      Noted Date   Diagnosed Date Resolved Date  
   
                          Stasis ulcer 2023  
   
                          Obesity, Class II, BMI 35-39.9 2021  
   
                          Medicare annual wellness visit, subsequent 2019  
   
                                                    Encounter for long-term (cur  
rent) use of   
medications         2018  
   
                                                      
  
  
Overview:Formatting of this note might be different from the original.  
Added automatically from request for surgery [id number]  
   
   
                          History of colonic polyps 2018  
   
                                                      
  
  
Overview:Formatting of this note might be different from the original.  
Added automatically from request for surgery [id number]  
   
   
                          Gastroesophageal reflux disease 2018  
   
                                                      
  
  
Overview:Formatting of this note might be different from the original.  
Added automatically from request for surgery [id number]  
   
   
                          Acute pulmonary edema 10/05/2011                10/06/  
2011  
   
                                                      
  
  
Overview:Formatting of this note might be different from the original.  
10/5/2011  
cardiogenic and noncardiogenic factors  
  
   
   
                          Atelectasis  10/05/2011                10/06/2011  
   
                          Hypotension  10/04/2011                10/06/2011  
   
                                                      
  
  
Overview:Formatting of this note might be different from the original.  
10/4/2011  
goal map 65-80  
   
   
                          Stress hyperglycemia 10/04/2011                10/06/2  
011  
   
                                                      
  
  
Overview:Formatting of this note might be different from the original.  
10/4/2011  
Perioperative insulin resistance and exacerbation of hyperglycemia.  
Control blood glucose with titration of insulin infusion  
  
10/5/2011  
adequate control  
goal FBG<180  
  
   
   
                          Post-op pain 10/04/2011                10/06/2011  
   
                          Mechanically assisted ventilation 10/04/2011            
      10/05/2011  
   
                                                      
  
  
Overview:Formatting of this note might be different from the original.  
10/4/2011  
optimize MV settings, WTE  
current setting:FiO2, 75%, peep 8  
   
   
                          Cardiac insufficiency 10/04/2011                10/05/  
2011  
   
                                                      
  
  
Overview:Formatting of this note might be different from the original.  
10/4/2011  
RV mild to moderate post pump  
goal CI>2.3  
   
   
                          Hypoxemia    10/04/2011                10/06/2011  
   
                          discharge planning 2011                10/19/201  
1  
   
                                                      
  
  
Overview:Formatting of this note might be different from the original.  
age 71,  lives in [place], [place]. No DC needs.  
/  
   
   
                          Pre-op testing 2011                10/04/2011  
   
                                                      
  
  
Overview:Formatting of this note is different from the original.  
Images from the original note were not included.  
[facility]  
PRE-OP CHECKLIST  
  
Surgeon: Deangelo Angulo M.D. Informed Consent Completed: No  
STS Score: 1.4%  
CAD: Yes - CAD on Problem List: Yes  
Is intended procedure a CABG: Yes - is a beta blocker ordered? Yes  
  
H & P completed: Yes  
  
PA/LAT: Completed CT: Completed MRI: N/A LE US: N/A  
  
Cath: Yes - reviewed: Yes Echo:Completed EKG: Completed EF %: 61  
  
PI's: N/A Carotid: Completed Mapping: N/A Dental: Completed PFT's: N/A  
  
Basename 11 0729  
WBC 7.18  
HB 13.1  
HCT 41.8  
  
INR 1.0  
CREAT 1.35  
  
UA: Normal  
HCG:N/A  
  
ABO/ABO Confirmed: Yes  
  
Blood ordered: No  
  
SA Swab: Yes - results: Pending  
  
Last Dose of Anticoagulation: vitamins  
  
Op Note: N/A  
  
Pacemaker Check: N/A  
  
Consults: GI 2001  
  
DM: No  
  
Cardiac Surgical prep: No  
  
SIGNATURE: Krystal Castellanos RN CHECKED BY:  
DATE of SERVICE: 2011  
TIME of SERVICE: 2:23 PM  
  
  
   
   
                          Anemia of chronic disease 2011  
   
                          Intestinal polyp 2011  
   
                                                      
  
  
Overview:Formatting of this note might be different from the original.  
Distal ileum ulcerated polyp.  
   
   
                                                    Nonspecific abnormal results  
 of liver   
function study      2006  
   
                          Impotence of organic origin 2006  
   
                                                    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  
9.9 (morbid   
obesity)                                                    2021  
  
documented as of this encounter (statuses as of 2023)  
[facility]03- History of Past illness Narrative*   
  
                          Problem      Noted Date   Diagnosed Date Resolved Date  
   
                          Stasis ulcer 2023  
   
                          Obesity, Class II, BMI 35-39.9 2021  
   
                          Medicare annual wellness visit, subsequent 2019  
   
                                                    Encounter for long-term (cur  
rent) use of   
medications         2018  
   
                                                      
  
  
Overview:Formatting of this note might be different from the original.  
Added automatically from request for surgery [id number]  
   
   
                          History of colonic polyps 2018  
   
                                                      
  
  
Overview:Formatting of this note might be different from the original.  
Added automatically from request for surgery [id number]  
   
   
                          Gastroesophageal reflux disease 2018  
   
                                                      
  
  
Overview:Formatting of this note might be different from the original.  
Added automatically from request for surgery [id number]  
   
   
                          Acute pulmonary edema 10/05/2011                10/06/  
2011  
   
                                                      
  
  
Overview:Formatting of this note might be different from the original.  
10/5/2011  
cardiogenic and noncardiogenic factors  
  
   
   
                          Atelectasis  10/05/2011                10/06/2011  
   
                          Hypotension  10/04/2011                10/06/2011  
   
                                                      
  
  
Overview:Formatting of this note might be different from the original.  
10/4/2011  
goal map 65-80  
   
   
                          Stress hyperglycemia 10/04/2011                10/06/2  
011  
   
                                                      
  
  
Overview:Formatting of this note might be different from the original.  
10/4/2011  
Perioperative insulin resistance and exacerbation of hyperglycemia.  
Control blood glucose with titration of insulin infusion  
  
10/5/2011  
adequate control  
goal FBG<180  
  
   
   
                          Post-op pain 10/04/2011                10/06/2011  
   
                          Mechanically assisted ventilation 10/04/2011            
      10/05/2011  
   
                                                      
  
  
Overview:Formatting of this note might be different from the original.  
10/4/2011  
optimize MV settings, WTE  
current setting:FiO2, 75%, peep 8  
   
   
                          Cardiac insufficiency 10/04/2011                10/05/  
2011  
   
                                                      
  
  
Overview:Formatting of this note might be different from the original.  
10/4/2011  
RV mild to moderate post pump  
goal CI>2.3  
   
   
                          Hypoxemia    10/04/2011                10/06/2011  
   
                          discharge planning 2011                10/19/201  
1  
   
                                                      
  
  
Overview:Formatting of this note might be different from the original.  
age 71,  lives in [place], [place]. No DC needs.  
/  
   
   
                          Pre-op testing 2011                10/04/2011  
   
                                                      
  
  
Overview:Formatting of this note is different from the original.  
Images from the original note were not included.  
[facility]  
PRE-OP CHECKLIST  
  
Surgeon: Deangelo Angulo M.D. Informed Consent Completed: No  
STS Score: 1.4%  
CAD: Yes - CAD on Problem List: Yes  
Is intended procedure a CABG: Yes - is a beta blocker ordered? Yes  
  
H & P completed: Yes  
  
PA/LAT: Completed CT: Completed MRI: N/A LE US: N/A  
  
Cath: Yes - reviewed: Yes Echo:Completed EKG: Completed EF %: 61  
  
PI's: N/A Carotid: Completed Mapping: N/A Dental: Completed PFT's: N/A  
  
Basename 11 0729  
WBC 7.18  
HB 13.1  
HCT 41.8  
  
INR 1.0  
CREAT 1.35  
  
UA: Normal  
HCG:N/A  
  
ABO/ABO Confirmed: Yes  
  
Blood ordered: No  
  
SA Swab: Yes - results: Pending  
  
Last Dose of Anticoagulation: vitamins  
  
Op Note: N/A  
  
Pacemaker Check: N/A  
  
Consults: GI 2001  
  
DM: No  
  
Cardiac Surgical prep: No  
  
SIGNATURE: Krystal Castellanos RN CHECKED BY:  
DATE of SERVICE: 2011  
TIME of SERVICE: 2:23 PM  
  
  
   
   
                          Anemia of chronic disease 2011  
   
                          Intestinal polyp 2011  
   
                                                      
  
  
Overview:Formatting of this note might be different from the original.  
Distal ileum ulcerated polyp.  
   
   
                                                    Nonspecific abnormal results  
 of liver   
function study      2006  
   
                          Impotence of organic origin 2006  
   
                                                    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  
9.9 (morbid   
obesity)                                                    2021  
  
documented as of this encounter (statuses as of 10/19/2023)  
[facility]03- History of Past illness Narrative*   
  
                          Problem      Noted Date   Diagnosed Date Resolved Date  
   
                          Stasis ulcer 2023  
   
                          Obesity, Class II, BMI 35-39.9 2021  
   
                          Medicare annual wellness visit, subsequent 2019  
   
                                                    Encounter for long-term (cur  
rent) use of   
medications         2018  
   
                                                      
  
  
Overview:Formatting of this note might be different from the original.  
Added automatically from request for surgery [id number]  
   
   
                          History of colonic polyps 2018  
   
                                                      
  
  
Overview:Formatting of this note might be different from the original.  
Added automatically from request for surgery [id number]  
   
   
                          Gastroesophageal reflux disease 2018  
   
                                                      
  
  
Overview:Formatting of this note might be different from the original.  
Added automatically from request for surgery [id number]  
   
   
                          Acute pulmonary edema 10/05/2011                10/06/  
2011  
   
                                                      
  
  
Overview:Formatting of this note might be different from the original.  
10/5/2011  
cardiogenic and noncardiogenic factors  
  
   
   
                          Atelectasis  10/05/2011                10/06/2011  
   
                          Hypotension  10/04/2011                10/06/2011  
   
                                                      
  
  
Overview:Formatting of this note might be different from the original.  
10/4/2011  
goal map 65-80  
   
   
                          Stress hyperglycemia 10/04/2011                10/06/2  
011  
   
                                                      
  
  
Overview:Formatting of this note might be different from the original.  
10/4/2011  
Perioperative insulin resistance and exacerbation of hyperglycemia.  
Control blood glucose with titration of insulin infusion  
  
10/5/2011  
adequate control  
goal FBG<180  
  
   
   
                          Post-op pain 10/04/2011                10/06/2011  
   
                          Mechanically assisted ventilation 10/04/2011            
      10/05/2011  
   
                                                      
  
  
Overview:Formatting of this note might be different from the original.  
10/4/2011  
optimize MV settings, WTE  
current setting:FiO2, 75%, peep 8  
   
   
                          Cardiac insufficiency 10/04/2011                10/05/  
2011  
   
                                                      
  
  
Overview:Formatting of this note might be different from the original.  
10/4/2011  
RV mild to moderate post pump  
goal CI>2.3  
   
   
                          Hypoxemia    10/04/2011                10/06/2011  
   
                          discharge planning 2011                10/19/201  
1  
   
                                                      
  
  
Overview:Formatting of this note might be different from the original.  
age 71,  lives in [place], [place]. No DC needs.  
/  
   
   
                          Pre-op testing 2011                10/04/2011  
   
                                                      
  
  
Overview:Formatting of this note is different from the original.  
Images from the original note were not included.  
[facility]  
PRE-OP CHECKLIST  
  
Surgeon: Deangelo Angulo M.D. Informed Consent Completed: No  
STS Score: 1.4%  
CAD: Yes - CAD on Problem List: Yes  
Is intended procedure a CABG: Yes - is a beta blocker ordered? Yes  
  
H & P completed: Yes  
  
PA/LAT: Completed CT: Completed MRI: N/A LE US: N/A  
  
Cath: Yes - reviewed: Yes Echo:Completed EKG: Completed EF %: 61  
  
PI's: N/A Carotid: Completed Mapping: N/A Dental: Completed PFT's: N/A  
  
Basename 11 0729  
WBC 7.18  
HB 13.1  
HCT 41.8  
  
INR 1.0  
CREAT 1.35  
  
UA: Normal  
HCG:N/A  
  
ABO/ABO Confirmed: Yes  
  
Blood ordered: No  
  
SA Swab: Yes - results: Pending  
  
Last Dose of Anticoagulation: vitamins  
  
Op Note: N/A  
  
Pacemaker Check: N/A  
  
Consults: GI 2001  
  
DM: No  
  
Cardiac Surgical prep: No  
  
SIGNATURE: Krystal Castellanos RN CHECKED BY:  
DATE of SERVICE: 2011  
TIME of SERVICE: 2:23 PM  
  
  
   
   
                          Anemia of chronic disease 2011  
   
                          Intestinal polyp 2011  
   
                                                      
  
  
Overview:Formatting of this note might be different from the original.  
Distal ileum ulcerated polyp.  
   
   
                                                    Nonspecific abnormal results  
 of liver   
function study      2006  
   
                          Impotence of organic origin 2006  
   
                                                    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  
9.9 (morbid   
obesity)                                                    2021  
  
documented as of this encounter (statuses as of 2023)  
[facility]03- History of Past illness Narrative*   
  
                          Problem      Noted Date   Diagnosed Date Resolved Date  
   
                          Stasis ulcer 2023  
   
                          Obesity, Class II, BMI 35-39.9 2021  
   
                          Medicare annual wellness visit, subsequent 2019  
   
                                                    Encounter for long-term (cur  
rent) use of   
medications         2018  
   
                                                      
  
  
Overview:Formatting of this note might be different from the original.  
Added automatically from request for surgery [id number]  
   
   
                          History of colonic polyps 2018  
   
                                                      
  
  
Overview:Formatting of this note might be different from the original.  
Added automatically from request for surgery [id number]  
   
   
                          Gastroesophageal reflux disease 2018  
   
                                                      
  
  
Overview:Formatting of this note might be different from the original.  
Added automatically from request for surgery [id number]  
   
   
                          Acute pulmonary edema 10/05/2011                10/06/  
2011  
   
                                                      
  
  
Overview:Formatting of this note might be different from the original.  
10/5/2011  
cardiogenic and noncardiogenic factors  
  
   
   
                          Atelectasis  10/05/2011                10/06/2011  
   
                          Hypotension  10/04/2011                10/06/2011  
   
                                                      
  
  
Overview:Formatting of this note might be different from the original.  
10/4/2011  
goal map 65-80  
   
   
                          Stress hyperglycemia 10/04/2011                10/06/2  
011  
   
                                                      
  
  
Overview:Formatting of this note might be different from the original.  
10/4/2011  
Perioperative insulin resistance and exacerbation of hyperglycemia.  
Control blood glucose with titration of insulin infusion  
  
10/5/2011  
adequate control  
goal FBG<180  
  
   
   
                          Post-op pain 10/04/2011                10/06/2011  
   
                          Mechanically assisted ventilation 10/04/2011            
      10/05/2011  
   
                                                      
  
  
Overview:Formatting of this note might be different from the original.  
10/4/2011  
optimize MV settings, WTE  
current setting:FiO2, 75%, peep 8  
   
   
                          Cardiac insufficiency 10/04/2011                10/05/  
2011  
   
                                                      
  
  
Overview:Formatting of this note might be different from the original.  
10/4/2011  
RV mild to moderate post pump  
goal CI>2.3  
   
   
                          Hypoxemia    10/04/2011                10/06/2011  
   
                          discharge planning 2011                10/19/201  
1  
   
                                                      
  
  
Overview:Formatting of this note might be different from the original.  
age 71,  lives in [place], [place]. No DC needs.  
/  
   
   
                          Pre-op testing 2011                10/04/2011  
   
                                                      
  
  
Overview:Formatting of this note is different from the original.  
Images from the original note were not included.  
[facility]  
PRE-OP CHECKLIST  
  
Surgeon: Deangelo Angulo M.D. Informed Consent Completed: No  
STS Score: 1.4%  
CAD: Yes - CAD on Problem List: Yes  
Is intended procedure a CABG: Yes - is a beta blocker ordered? Yes  
  
H & P completed: Yes  
  
PA/LAT: Completed CT: Completed MRI: N/A LE US: N/A  
  
Cath: Yes - reviewed: Yes Echo:Completed EKG: Completed EF %: 61  
  
PI's: N/A Carotid: Completed Mapping: N/A Dental: Completed PFT's: N/A  
  
Basename 11 0729  
WBC 7.18  
HB 13.1  
HCT 41.8  
  
INR 1.0  
CREAT 1.35  
  
UA: Normal  
HCG:N/A  
  
ABO/ABO Confirmed: Yes  
  
Blood ordered: No  
  
SA Swab: Yes - results: Pending  
  
Last Dose of Anticoagulation: vitamins  
  
Op Note: N/A  
  
Pacemaker Check: N/A  
  
Consults: GI 2001  
  
DM: No  
  
Cardiac Surgical prep: No  
  
SIGNATURE: Krystal Castellanos RN CHECKED BY:  
DATE of SERVICE: 2011  
TIME of SERVICE: 2:23 PM  
  
  
   
   
                          Anemia of chronic disease 2011  
   
                          Intestinal polyp 2011  
   
                                                      
  
  
Overview:Formatting of this note might be different from the original.  
Distal ileum ulcerated polyp.  
   
   
                                                    Nonspecific abnormal results  
 of liver   
function study      2006  
   
                          Impotence of organic origin 2006  
   
                                                    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  
9.9 (morbid   
obesity)                                                    2021  
  
documented as of this encounter (statuses as of 2023)  
[facility]- Instructions
  * Patient Instructions*   
  
Júnior Ahuja MD - 2023 7:33 PM EDT  
  
Formatting of this note might be different from the original.  
STOP FUROSEMIDE WHILE ON TORSEMIDE X 5 DAYS.  
Electronically signed by Júnior Ahuja MD at 2023 7:33 PM EDT  
  
  
  
documented in this encounterLicking [facility]03- History of Present 
illness Narrative* Júnior Ahuja MD - 2023 7:16 PM EDTFormatting of 
  this note is different from the original.  
This note was created using NoteWriter.  
Subjective  
Patient presents with:  
[facility] follow[facility]  
  
Mitesh Braun is a 83 year old male here for EC follow up. He was seen 3/10 
for venous stasis ulceration of the right calf and cellulitis. He was treated 
with doxycycline with mild improvement. Hewas advised [facility], and he has 
an appointment tomorrow for initial evaluation, possibly with Dr. Jaime Madrigal. 
He also had a rash on his left leg that was not only itchy but burning.  
  
He had blisters of his left medial hand near the wrist from handling hot jelly.  
  
Review of Systems  
Constitutional: Negative for chills and fever.  
Respiratory: Negative for shortness of breath.  
Cardiovascular: Positive for leg swelling.  
Skin: Positive for rash and wound.  
  
ACTIVE PROBLEM LIST  
Essential hypertension  
Caren On Cpap  
Esophageal Reflux  
Gout  
Mixed hyperlipidemia  
Impaired Fasting Glucose  
Aortic Valve Disorder  
Polycystic Kidney  
Adenomatous Colon Polyp  
Thrombocytopenia (HCC)  
Cad (Coronary Artery Disease), Native Coronary Artery  
Adjustment Disorder With Depressed Mood  
Asthma Exacerbation  
Peripheral Polyneuropathy  
Splenomegaly  
Ckd (Chronic Kidney Disease) Stage 3, Gfr 30-59 Ml/Min ([facility])  
Abnormality of Gait  
Anemia of Chronic Disease  
Urge Incontinence of Urine  
Obesity, Class II, Bmi 35-39.9  
Frequent Falls  
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  
Iron Malabsorption  
Liver Lesion  
  
Current Outpatient Medications  
Medication Sig  
oxybutynin ER (DITROPAN XL) 15 mg 24 hr Extended Rel Tab Take 2 tablets by mouth
 once daily.  
furosemide (LASIX) 20 mg tablet TAKE 1 TABLET BY MOUTH EVERY OTHER RDAY  
Diaper,Brief, Adult,Disposable (DEPEND EASY FIT UNDERGARMENTS) 1 Each three 
times daily.  
ascorbic acid, vitamin C, (VITAMIN C) 500 mg tablet Take 1 tablet by mouth once 
daily.  
ascorbic acid-elderberry fruit 100-50 mg chew Take 2 tablets by mouth once 
daily.  
cyanocobalamin (VITAMIN B-12) 2,500 mcg tablet Take 2,500 mcg by mouth once 
daily.  
melatonin 10 mg tab Take 1 tablet by mouth as needed.  
aspirin, enteric coated (ASPIRIN, ENTERIC COATED) 81 mg EC tablet Take 81 mg by 
mouth once daily.  
albuterol HFA (PROVENTIL HFA, VENTOLIN HFA) 90 mcg/actuation inhaler Inhale 2 
Puffs as instructed every 4 hours as needed for wheezing/shortness of breath.  
omeprazole (PRILOSEC) 40 mg capsule Take 1 capsule by mouth once daily.  
allopurinol (ZYLOPRIM) 300 mg tablet Take 1 tablet by mouth once daily.  
magnesium oxide 400 mg magnesium cap Take 1 capsule by mouth once daily.  
fenofibrate nanocrystallized (TRICOR) 48 mg tablet Take 1 tablet by mouth once 
daily.  
sertraline (ZOLOFT) 50 mg tablet Take 1 tablet by mouth once daily.  
nitroglycerin sublingual (NITROQUICK) 0.4 mg SL tablet Dissolve 1 tablet under 
the tongue every 5 minutes as needed for chest pain. FOR CHEST PAIN. IF NO 
RELIEF CALL 911  
fluticasone (FLONASE) 50 mcg/actuation nasal spray Use 1 Spray in each nostril 
once daily.  
ferrous sulfate 325 mg (65 mg iron) EC tablet Take 1 tablet by mouth twice daily
 with meals. (Patient taking differently: Take 325 mg by mouth once daily.)  
amoxicillin (POLYMOX, AMOXIL) 500 mg capsule Take four tablets, one hour prior 
to dental work (Patient taking differently: Take four tablets by mouth , one 
hour prior to dental work)  
fluticasone-vilanterol (BREO ELLIPTA) 200-25 mcg/dose inhaler Inhale 1 
Inhalation as instructed once daily. Inhale one puff once daily. DO NOT CLICK 
OPEN UNTIL READY FOR DOSE  
multivitamin tablet Take 1 tablet by mouth once daily.  
torsemide (DEMADEX) 20 mg tablet Take 1 tablet by mouth once daily for 5 days.  
  
No current facility-administered medications for this visit.  
  
  
Objective  
/72 (BP Site: Left Arm, BP Position: Sitting, BP Cuff Size: Large Adult)  
 Pulse 72   Wt 109.3 kg (241 lb)   SpO2 100%   BMI 37.75 kg/m  
Physical Exam  
Constitutional:  
General: He is not in acute distress.  
Appearance: He is not ill-appearing.  
Cardiovascular:  
Rate and Rhythm: Normal rate and regular rhythm.  
Comments: Lymphedema. Right leg with scattered excoriated superficial small 
ulcers, some with surrounding erythema, most are non weeping, some with serous 
drainage. No lymphangitic streaking, no tenderness. Left leg with few ulcers. 
Left medial knee with palm sized cluster of zosteriform post inflammatory 
pigmented maculopapules.  
Pulmonary:  
Breath sounds: Normal breath sounds.  
Musculoskeletal:  
Right lower leg: 3+ Edema present.  
Left lower le+ Edema present.  
Skin:  
Comments: 2 small, vesicles of the medial left hand from burn. No s/s infection.  
Neurological:  
Mental Status: He is alert.  
  
Assessment and Plan  
  
1. Venous stasis ulcer of other part of right lower leg limited to breakdown of 
skin without varicose veins (HCC) - ICD9: 459.81, 707.15, ICD10: I87.2, L97.811 
(primary diagnosis)  
- Keep [facility] appointment.  
- TORSEMIDE 20 MG TABLET. 40 mg daily x 5 days. Hold furosemide while on this.  
- CEPHALEXIN 500 MG CAPSULE. 4 times per day x 7 days.  
- We can consider extending torsemide, but will need to monitor his CKD. Call 
back if needed.  
  
2. Rash - ICD9: 782.1, ICD10: R21  
Probably zoster, beyond window for antiviral.  
  
3. Partial thickness burn of left hand, unspecified site of hand, initial 
encounter - ICD9: 944.20,ICD10: T23.A  
- Do not puncture. If it drains, keep clean and apply antibiotic ointment.  
  
Júnior Ahuja MD  
  
Electronically signed by Júnior Ahuja MD at 2023 8:39 AM EDT  
  
documented in this encounterLicking [facility] Hospital03- Miscellaneous Notes* 
  Telephone Encounter - Son Norton Ma - 2023 3:17 PM EDTFormatting of this
   note might be different from the original.  
Faxed.  
Electronically signed by Son Norton Ma at 2023 3:17 PM EDT  
  
* Telephone Encounter - Starr Marquez LPN - 2023 10:55 AM EDTFormatting
   of this note might be different from the original.  
Cece from [facility] calling patient had called to schedule appt, they 
have no referral. [facility] Dr [facility]. Fax number is [phone], pending order needs diagnosis.  
Please advise  
Electronically signed by Starr Marquez LPN at 2023 11:00 AM EDT  
  
documented in this encounterLicking [facility] Hospital03- NoteOhio[facility] Center03- History of Present illness Narrative* Marin Rankin MD -
   03/10/2023 2:27 PM ESTFormatting of this note is different from the original.  
Patient presents with:  
sores on lower legs: X 2 weeks  
  
HPI:  
Skin Lesion:  
Location: right lower leg  
Duration: 2 weeks  
Pruritis/Pain: was pruritic, neosporin takes the hurt away  
Change: starting to seep watery liquid  
Drainage/blister/pustule/ulceration: open sores, red.  
Treatment: neosporin  
  
Denies chest pain, shortness of breath, dizziness, palpitations, fever, chills.  
Medication compliance: no changes recently. Sees heart and kidney doctors, no 
recent changes.  
  
MEDICATIONS:  
oxybutynin ER (DITROPAN XL) 15 mg 24 hr Extended Rel Tab Take 2 tablets by mouth
 once daily.  
furosemide (LASIX) 20 mg tablet TAKE 1 TABLET BY MOUTH EVERY OTHER RDAY  
Diaper,Brief, Adult,Disposable (DEPEND EASY FIT UNDERGARMENTS) 1 Each three 
times daily.  
ascorbic acid, vitamin C, (VITAMIN C) 500 mg tablet Take 1 tablet by mouth once 
daily.  
ascorbic acid-elderberry fruit 100-50 mg chew Take 2 tablets by mouth once 
daily.  
cyanocobalamin (VITAMIN B-12) 2,500 mcg tablet Take 2,500 mcg by mouth once 
daily.  
melatonin 10 mg tab Take 1 tablet by mouth as needed.  
aspirin, enteric coated (ASPIRIN, ENTERIC COATED) 81 mg EC tablet Take 81 mg by 
mouth once daily.  
albuterol HFA (PROVENTIL HFA, VENTOLIN HFA) 90 mcg/actuation inhaler Inhale 2 
Puffs as instructed every 4 hours as needed for wheezing/shortness of breath.  
omeprazole (PRILOSEC) 40 mg capsule Take 1 capsule by mouth once daily.  
allopurinol (ZYLOPRIM) 300 mg tablet Take 1 tablet by mouth once daily.  
magnesium oxide 400 mg magnesium cap Take 1 capsule by mouth once daily.  
fenofibrate nanocrystallized (TRICOR) 48 mg tablet Take 1 tablet by mouth once 
daily.  
sertraline (ZOLOFT) 50 mg tablet Take 1 tablet by mouth once daily.  
nitroglycerin sublingual (NITROQUICK) 0.4 mg SL tablet Dissolve 1 tablet under 
the tongue every 5 minutes as needed for chest pain. FOR CHEST PAIN. IF NO 
RELIEF CALL 911  
fluticasone (FLONASE) 50 mcg/actuation nasal spray Use 1 Spray in each nostril 
once daily.  
ferrous sulfate 325 mg (65 mg iron) EC tablet Take 1 tablet by mouth twice daily
 with meals. (Patient taking differently: Take 325 mg by mouth once daily.)  
amoxicillin (POLYMOX, AMOXIL) 500 mg capsule Take four tablets, one hour prior 
to dental work (Patient taking differently: Take four tablets by mouth , one 
hour prior to dental work)  
fluticasone-vilanterol (BREO ELLIPTA) 200-25 mcg/dose inhaler Inhale 1 
Inhalation as instructed once daily. Inhale one puff once daily. DO NOT CLICK 
OPEN UNTIL READY FOR DOSE  
multivitamin tablet Take 1 tablet by mouth once daily.  
torsemide (DEMADEX) 20 mg tablet Take 1 tablet by mouth once daily for 5 days.  
  
ALLERGIES:  
ALLERGIES  
Allergen Reactions  
Bees Anaphylaxis  
Acetaminophen Other: See Comments  
Hydrocodone GI Upset  
Lipitor [Atorvastat* Itching  
Singulair [[place]el[place]* Itching  
Strawberries Hives  
hives  
  
VITALS:  
/82   Pulse 92   Temp 36.7 C (98 F) (Tympanic)   Resp 18   Wt 108.2 kg 
(238 lb 9.6 oz)   CbF430%   BMI 37.37 kg/m  
  
Last 6 Encounter Wt Readings:  
Date: Wt:  
03/10/2023 108.2 kg (238 lb 9.6 oz)  
2023 108.2 kg (238 lb 8 oz)  
2023 111.6 kg (246 lb)  
2023 114.3 kg (252 lb)  
2023 114.3 kg (252 lb)  
2022 110.9 kg (244 lb 8 oz)  
  
PE:  
Pleasant, in no acute distress. Sitting in wheeled chair. Accompanied by his 
daughter.  
EXT: bilateral lymphedema. 2cm dry red based ulcer left upper shin; few 
millimeters of erythematoushalo.  
Numerous excoriation ulcers with red base anterior right lower leg from mid shin
 to ankle. Ulcers ranging in size from 1/2 to 2 cm. Surrounding rythema 
increases to confluent at and above the ankle. There is a 3 cm deeper ulceration
 with some white granulation medial lower calf.  
  
ASSESSMENT/PLAN:  
1. Venous stasis ulcer of right calf limited to breakdown of skin without 
varicose veins (HCC) - ICD9: 459.81, 707.12, ICD10: I87.2, L97.211 (primary 
diagnosis)  
2. Cellulitis of right lower leg - ICD9: 682.6, ICD10: L03.115  
Inflammation from open sores, possible early cellulitis.  
- DOXYCYCLINE MONOHYDRATE 100 MG CAPSULE  
Change ointment to bacitracin which has lower allergy incidence.  
Elevate legs when able.  
Wound Center recommended. He will call to schedule with [facility].  
  
Marin Rankin MD  
  
  
Electronically signed by Marin Rankin MD at 03/10/2023 2:55 PM EST  
  
documented in this encounterLicking [facility] Hospital02- Miscellaneous Notes* 
  Telephone Encounter - KELSEA Rahman RN - 2023 12:57 PM ESTFormatting of
   this note is different from the original.  
Patient has been identified by name and date of birth: Yes, Provider Dr. Ahuja Date 23 Time 12:58 pm  
Patient phones for refill(s):  
Requested Prescriptions  
  
Pending Prescriptions Disp Refills  
oxybutynin ER (DITROPAN XL) 15 mg 24 hr Extended Rel Tab 180 tablet 3  
Sig: Take 2 tablets by mouth once daily.  
  
Date of last office visit with pcp: 23. Next appt: 23  
Last 2 Encounter Wt Readings:  
Date: Wt:  
2023 108.2 kg (238 lb 8 oz)  
2023 111.6 kg (246 lb)  
Previous labs/tests for medication: Blood Pressure:  
BUN (mg/dL)  
Date Value  
2023 32  
2022 28  
  
Sodium (mmol/L)  
Date Value  
2023 137  
2022 139  
Last 1 Encounter BP Readings:  
Date: BP:  
2023 122/72  
Liver Function:  
ALT (U/L)  
Date Value  
2023 29  
2021 18  
2021 19  
  
AST (U/L)  
Date Value  
2023 41  
2021 30  
2021 31  
  
Please advise. Thank you. KELSEA Rahman RN  
  
Electronically signed by KELSEA Rahman RN at 2023 12:59 PM EST  
  
documented in this encounterLicking [facility] Hospital02- NoteOhio[facility]02- History of Present illness Narrative* Tello Ortega, DO - 
  2023 9:33 AM ESTFormatting of this note is different from the original.  
Hematologic problem(s):  
1) IgG kappa monoclonal gammopathy  
2) BREEZY.  
3) Liver mass.  
4) Hypercalcemia.  
5) Splenomegaly.  
6) Cirrhosis.  
  
HPI: The patient is an 83 yo male with a PMH significant for HTN, polycystic 
kidney disease (seeingnephrology for ~15 years), HLD, aortic repair (bovine 
valve), CAD, CAREN (CPAP), asthma, hypercalcemia, splenomegaly (noted on CT chest 
2014; spleen up to 18.4 cm in AP dimension; not enlarged on CT enterography 
) and obesity.  
  
He had lab work ordered at his nephrologist office. Protein electrophoresis of 
the urine demonstrated no evidence of monoclonal spike. Urine protein creatinine
 ratio was elevated. Chemistry panel showed a creatinine of 1.33 mg/dL. Calcium 
mildly elevated 10.6 mg/dL. Vitamin D39.7 PTH 93.4 CBC significant for a total 
white count of 4900. No differential performed. Hemoglobin 9.0 g/dL. . 
Platelet count 79,000. Protein electrophoresis of the serum revealed a M spike 
but no immunofixation was performed.  
  
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 Worse over time.
 He's never been given an answer as to why.  
  
Balance is off and he uses wheeled walker.  
  
[place] alone [place]. Has home health aides for cleaning and washing 
clothes [facility]. Daughter lives close by and he gets meals on wheels.  
  
Was having left posterior hip pain and got relief with Absorsene Jr.  
  
Presents for ongoing hematologic management.  
  
Interim history:  
He offers no complaints today.  
  
He received 10 doses of iron sucrose.  
Feels much better.  
  
Sensory neuropathy symptoms stable.  
Seen by neurology group in [place].  
Was told nothing to be done for neuropathy.  
Uses wheeled walker.  
  
Better appetite. Gets meals on wheels.  
No abdominal pain or bloating.  
  
Underwent bone marrow biopsy at [facility].  
  
ROS:  
Constitutional: Denies episodes of fever and night sweats.  
Neuro: See above. He has mild left hemiparesis.  
HEENT: No recent change in voice, vision or hearing.  
Resp: Denies cough, wheeze and hemoptysis. Denies shortness of breath at rest.  
CVS: Denies exertional chest pain, PND, orthopnea. Chronic bilateral lower 
extremity swelling.  
GI: Bowels typically move twice a day. Formed stools. Black from iron. No blood 
observed..  
: Denies dysuria or gross hematuria.  
Endo: Denies hot flashes. Denies polyuria and polydipsia. Denies heat and cold 
intolerance.  
Musculoskeletal: No musculoskeletal pain other than left hip pain as outlined 
above.  
Derm: Denies rash. Denies jaundice and diffuse pruritis.  
Heme: Denies unusual bleeding and unexplained bruising.  
Psych: Normal mood.  
  
PHYSICAL EXAM:  
Vitals: Blood pressure 122/72, pulse 66, temperature 36.3 C (97.3 F), weight 
108.2 kg (238 lb 8 oz), SpO2 99 %.  
Well-appearing and in no acute distress.  
EYES: Sclerae are anicteric bilaterally.  
LYMPHATIC: There is no palpable cervical or supraclavicular adenopathy.  
RESPIRATORY: Inspiratory breath sounds are of normal intensity in all fields. No
 rales, wheezes or rhonchi.  
CARDIOVASCULAR: Rhythm is regular.  
ABDOMEN: The abdomen is nondistended.  
Extremities: Bilateral lower extremity swelling. Right somewhat worse than left.
 Overlying pitting.Stable.  
SKIN: No jaundice.  
NEUROLOGIC: CNs II-XII are grossly intact.  
  
LABS:  
Component Latest Ref Rng & Units 2022  
WBC 3.70 - 11.00 k/uL 5.22 4.65 3.87  
RBC 4.20 - 6.00 m/uL 3.27 (L) 3.73 (L) 3.63 (L)  
Hemoglobin 13.0 - 17.0 g/dL 10.9 (L) 11.1 (L) 11.8 (L)  
Hematocrit 39.0 - 51.0 % 34.6 (L) 36.0 (L) 36.2 (L)  
MCV 80.0 - 100.0 fL 105.8 (H) 96.5 99.7  
MCH 26.0 - 34.0 pg 33.3 29.8 32.5  
MCHC 30.5 - 36.0 g/dL 31.5 30.8 32.6  
RDW-CV 11.5 - 15.0 % 15.0 16.9 (H) 20.0 (H)  
Platelet Count 150 - 400 k/uL 69 (L) 98 (L) 66 (L)  
MPV 9.0 - 12.7 fL 12.2 11.4 9.8  
Neut% % 78.9 79.1  
Abs Neut (ANC) 1.45 - 7.50 k/uL 3.67 3.06  
Lymph% % 10.8 11.1  
Abs Lymph 1.00 - 4.00 k/uL 0.50 (L) 0.43 (L)  
Mono% % 5.6 6.2  
Abs Mono <0.87 k/uL 0.26 0.24  
Eosin% % 3.4 2.8  
Abs Eosin <0.46 k/uL 0.16 0.11  
Baso% % 0.9 0.5  
Abs Baso <0.11 k/uL 0.04 <0.03  
Immature Gran % % 0.4 0.3  
IMMATURE GRANS (ABS) <0.10 k/uL <0.03 <0.03  
NRBC /100 WBC 0.0 0.0  
Absolute nRBC <0.01 k/uL <0.01 <0.01 <0.01  
DTYPE Auto Auto  
Iron 41 - 186 ug/dL 30 (L) 46  
TIBC 232 - 386 ug/dL 370 360  
Transferrin Saturation 15.0 - 57.0 % 8.1 (L) 12.8 (L)  
Ferritin 30.3 - 565.7 ng/mL 24.8 (L) 50.4  
  
Component Latest Ref Rng & Units 2021  
Protein, Total 6.3 - 8.0 g/dL 6.4 5.8 (L) 6.6  
Albumin 3.9 - 4.9 g/dL 3.8 (L) 3.5 (L) 3.8 (L)  
Calcium 8.5 - 10.2 mg/dL 10.6 (H) 10.8 (H) 10.8 (H)  
Bilirubin, Total 0.2 - 1.3 mg/dL 0.5 0.4 0.5  
Alkaline Phosphatase 38 - 113 U/L 62 85 111  
AST 14 - 40 U/L 31 30 35  
Glucose 74 - 99 mg/dL 103 (H) 108 (H) 98  
BUN 9 - 24 mg/dL 26 (H) 33 (H) 27 (H)  
Creatinine 0.73 - 1.22 mg/dL 1.16 1.31 (H) 1.01  
Sodium 136 - 144 mmol/L 141 140 135 (L)  
Potassium 3.7 - 5.1 mmol/L 4.8 5.5 (H) 4.0  
Chloride 97 - 105 mmol/L 109 (H) 108 (H) 103  
CO2 22 - 30 mmol/L 23 25 25  
Anion Gap 9 - 18 mmol/L 9 7 (L) 7 (L)  
ALT 10 - 54 U/L 19 21 19  
eGFR-African American >60  
eGFR-All Other Races . >60  
eGFR >=60 mL/min/1.73m 54 (L) 74  
  
Component Latest Ref Rng & Units 2022  
Cold Agglut, 4 degrees C <1:32 Dilutions <1:32  
Cold Agglut, 37 degrees C Dilutions Test not indicated when titer is <1:32 at 4 
degrees C.  
  
Component Latest Ref Rng & Units 2022  
PT Sec <13.1 sec 10.9  
PT INR 0.9 - 1.3 1.1  
Pathologist Interpretation, CBCDIF Normocytic anemia with slight polychromasia .
 . .  
Pathologist (PASHA) Reviewed by Savanah Cole M.D., Ph.D  
APTT 23.0 - 32.4 sec 26.2  
Fibrinogen Ag 149 - 353 mg/dL 353  
  
Component Latest Ref Rng & Units 2022  
IgG 700 - 1,600 mg/dL 1,120  
IgA 70 - 400 mg/dL 346  
IgM 40 - 230 mg/dL 79  
  
Component Latest Ref Rng & Units 2022  
Albumin 3.43 - 5.41 g/dL 3.62  
Alpha 1 Globulin 0.18 - 0.43 g/dL 0.32  
Alpha 2 Globulin 0.42 - 0.98 g/dL 0.66  
Beta Globulin 0.61 - 1.17 g/dL 0.87  
Gamma Globulin 0.53 - 1.51 g/dL 1.12  
Interpretation (Prot Electro) No definitive M protein is identified on protein 
electrophoresis. An M protein is identified on protein electrophoresis. (A)  
Interpretation Comment for Protein Electrophoresis See separate immunofixation 
report for characterization of monoclonal gammopathy. . . .  
M-Protein Location Gamma Fraction 1  
M-Protein Concentration <=0.00 g/dL 0.30 (H)  
SPE Staff Review Reviewed by Sanaz Parrish MD  
  
Component Latest Ref Rng & Units 2022  
Kappa Free, Serum 3.3 - 19.4 mg/L 65.0 (H)  
Lambda Free, Serum 5.7 - 26.3 mg/L 37.1 (H)  
K/L Ratio, Serum 0.26 - 1.65 1.75 (H)  
  
IgG kappa on immunofixation of serum.  
  
Component Latest Ref Rng & Units 2023  
PTH, Intact 15 - 65 pg/mL 69 (H)  
  
PATHOLOGY:  
Bone marrow biopsy done at [facility] 2023:  
Hypercellular bone marrow with trilineage hematopoiesis.  
No evidence of lymphoproliferative disorder or plasma cell dyscrasia.  
  
Flow cytometry did not reveal evidence of lymphoproliferative disorder. There 
was no monoclonal plasma cell neoplasm.  
  
Plasma cells reported at 2% on aspirate.  
Iron was reported as rare stainable iron.  
  
Fluorescence in situ hybridization for BCR ABL was negative.  
  
46 XY [20].  
  
IMAGING:  
US 2022:  
IMPRESSION:  
1. There is a new hyperechoic nodule in the right lobe of the liver  
which could be further evaluated by MRI  
2. Nodular contour of the liver which may be due to cirrhosis  
3. Splenomegaly  
  
Bone survey 2022:  
IMPRESSION:  
SEVERAL SUBCENTIMETER LUCENT AREAS IN THE CALVARIUM.  
  
DIFFUSE OSTEOPENIA.  
  
DEGENERATIVE CHANGES AS DESCRIBED. POSTOPERATIVE CHANGES AS DESCRIBED.  
  
NO ADDITIONAL FOCAL LYTIC OR BLASTIC ABNORMALITY IS APPRECIATED.  
  
ASSESSMENT/PLAN:  
(D47.2) MGUS (monoclonal gammopathy of unknown significance) (primary encounter 
diagnosis)  
(D47.2) IgG monoclonal gammopathy  
Assessment:  
-The patient is an 82-year-old male with a past medical history as outlined 
above. He has no history of diabetes but has longstanding worsening sensory 
neuropathy of the feet and left hand. He has polycystic kidney disease as well 
as chronic mild hypercalcemia with elevation of PTH and stable serumcreatinine 
over time. Referred for possible serum monoclonal antibody on electrophoresis of
 the serum. Urine negative for monoclonal protein on electrophoresis.  
-Low-level IgG kappa monoclonal gammopathy.  
-Previous hypercalcemia secondary to hyperparathyroidism.  
-Bone survey revealed no lytic lesions.  
-Previous hypercalcemia from hyperparathyroidism.  
-I reviewed the results of the bone marrow biopsy in detail with the patient and
 his daughter. No evidence of smoldering myeloma or multiple myeloma.  
-I discussed the natural history, treated course, and prognosis of MGUS with the
 patient and his daughter.  
Plan:  
-Reassess in 1 year pending results of today's lab work.  
  
(D50.0) Iron deficiency anemia due to chronic blood loss  
(D69.6) Thrombocytopenia (HCC)  
Assessment:  
-He also has macrocytic anemia and thrombocytopenia. Prior reports of 
splenomegaly. I previously personally reviewed CT images from  and . In 
2014, spleen measured up to about 16 cm in craniocaudal dimension. No mention of
 fatty liver or other morphologic changes of the liver to suggest cirrhosis.  
-Ultrasound suggestive of cirrhosis with splenomegaly.  
-Likely some splenic sequestration of platelets.  
-Ferritin was low indicating iron deficiency. Feels much better after parenteral
 iron.  
-He is going to be scheduled with GI for colonoscopy at [facility]. Could not 
be done here.  
Plan:  
-Follow-up with colonoscopy.  
-Repeat CBC and iron studies in about 3 months.  
  
(E21.3) Hyperparathyroidism (HCC)  
Assessment:  
-Serum PTH level elevated.  
Plan:  
-Defer management to PCP.  
  
(K74.69) Other cirrhosis of liver (HCC)  
Assessment:  
-Established with hepatology.  
-Was told needs closed MRI. Open would not give adequate pictures to further 
investigate the potential mass.  
Plan:  
-Follow up with hepatology.  
  
Portions of this documentation were copied and pasted from previous office visit
 notes in order to provide a cohesive continuity of the history. The note has 
been reviewed and edited and updated as necessary.  
  
I spent a total of 40 minutes on the date of the service which included 
preparing to see the patient, face-to-face patient care, completing clinical 
documentation, obtaining and/or reviewing separately obtained history, 
performing a medically appropriate examination, counseling and educating the pat
ient/family/caregiver, communicating with other HCPs (not separately reported), 
independently interpreting results (not separately reported), and communicating 
results to the patient/family/caregiver.  
  
Tello Ortega DO  
Electronically signed by Tello Ortega DO at 2023 10:10 AM EST  
  
documented in this encounterLicking Memorial Hospital01- Miscellaneous Notes* 
  Telephone Encounter - Sarah Calle LPN - 2023 10:03 AM ESTFormatting of 
  this note might be different from the original.  
Faxed office note and message of referral request to Dr Donohue.  
  
Phone # [phone] fax# [phone]  
  
  
Electronically signed by Sarah Calle LPN at 2023 10:06 AM EST  
  
* Telephone Encounter - Nathalia Bernal PA-C - 2023 8:17 AM ESTFormatting of 
  this note might be different from the original.  
Please fax referral request including my recent office note to Dr. Donohue's 
office for EGD with possible banding of esophageal varices, and colonoscopy for 
anemia and history of colon polyps  
Electronically signed by Nathalia Bernal PA-C at 2023 8:18 AM EST  
  
documented in this encounterL[facility]01- Miscellaneous Notes* 
  Telephone Encounter - Marilin Sanchez - 2023 2:48 PM ESTFormatting of this
   note might be different from the original.  
Pt's D2 canceled and added on as a new D10  
Electronically signed by Marilin Sanchez at 2023 2:50 PM EST  
  
* Telephone Encounter - Cherelle Casillas LPN - 2023 1:52 PM ESTFormatting of
   this note might be different from the original.  
Daughter is aware of Dr. Ortega's recommendations. She is afraid that the patient
 is too weak and she will not be able to get him out of the house and into a car
 for urgent care/PCP visit. I instructed her if that was the case to call a 
squad and have patient transported to the ER.  
  
PSS- D2 iron will need rescheduled.  
  
Cherelle Casillas LPN  
  
Electronically signed by Cherelle Casillas LPN at 2023 1:58 PM EST  
  
* Telephone Encounter - Glenna Oneill RN - 2023 1:30 PM ESTFormatting of 
  this note might be different from the original.  
Called daughter, no answer, left a message requesting a call back.  
  
Per Dr. Ortega verbal response, okay to cancel iron today so patient can be 
evaluated elsewhere.  
  
Glenna Oneill RN  
  
I canceled iron infusion for today.  
  
PSS- D2 iron will need rescheduled.  
  
Cherelle Casillas LPN  
  
  
Electronically signed by Cherelle Casillas LPN at 2023 1:57 PM EST  
  
* Telephone Encounter - Tello Ortega DO - 2023 1:17 PM ESTFormatting of 
  this note might be different from the original.  
I don't think this is related to the iron infusions. He may have some sort of 
acute viral infectionso I would encourage her to take him to urgent care or talk
 to PCPs office about an urgent visit.  
  
Tello Ortega DO  
Electronically signed by Tello Ortega DO at 2023 1:27 PM EST  
  
* Telephone Encounter - Glenna Oneill RN - 2023 9:57 AM ESTFormatting of 
  this note might be different from the original.  
Spoke to daughter. Daughter stated on Saturday, patient had c/o diarrhea and 
 extreme fatigue and didn't feel like doing anything.  Yesterday his energy was 
a little bit better, patient was able to get up and move around. Daughter stated
 patients appetite is decreased but she was able to get him toeat chili and 
potato salad yesterday. Yesterday around 3:00 pm, patient fell when he was 
opening his front door to let his dog out. Per daughter, patient has bad 
neuropathy in his feet; left foot is worse,  he drags  his left foot. Daughter 
denies patient hitting his head or dizziness or visual changes related to the 
fall. Today, patients temperature is 99 F, he has chills, and at 6:00 am, 
patient had nausea and 1 episode of  green  emesis. Daughter denies headaches or
 cold symptoms. Daughter denies known sick contacts. Daughter aware this nurse 
will provide Dr. Ortega with an update and will call back with further 
instructions. Daughter stated understanding.  
  
Glenna Oneill RN  
  
Electronically signed by Glenna Oneill RN at 2023 10:10 AM EST  
  
* Telephone Encounter - Awa Olivas - 2023 9:13 AM ESTFormatting of this
   note might be different from the original.  
Patient received iron infusion 23. His daughter calls in stating that the 
patient has been experiencing severe fatigue, nausea, and a slightly elevated 
temperature. Daughter is requesting to be contacted to discuss plan of care. He 
is scheduled to receive another iron treatment today.  
Electronically signed by Awa Olivsa at 2023 9:15 AM EST  
  
documented in this encounterLicking [facility]01- Miscellaneous Notes* 
  Telephone Encounter - Glenna Oneill RN - 2023 10:34 AM ESTFormatting of 
  this note might be different from the original.  
Addressed in separate phone encounter.  
  
Glenna Oneill RN  
  
Electronically signed by Glenna Oneill RN at 2023 10:34 AM EST  
  
* Telephone Encounter - Cherelle Casillas LPN - 2023 9:17 AM ESTFormatting of
   this note might be different from the original.  
See phone note from 2023.  
  
Cherelle Casillas LPN  
  
Electronically signed by Cherelle Casillas LPN at 2023 9:17 AM EST  
  
documented in this encounterLicking [facility]01- NoteOhio[facility]- Instructions* Patient Instructions*   
  
Júnior Ahuja MD - 2023 3:42 PM EST  
  
Formatting of this note might be different from the original.  
DO NOT TAKE FUROSEMIDE WHILE TAKING TORSEMIDE.  
  
START TORSEMIDE TOMORROW.  
  
START ANTIBIOTIC TONIGHT.  
Electronically signed by Júnior Ahuja MD at 2023 3:42 PM EST  
  
  
  
documented in this encounterLicking [facility] Hospital01- History of Present 
illness Narrative* Júnior Ahuja MD - 2023 3:31 PM ESTFormatting of 
  this note is different from the original.  
This note was created using Appcelerator.  
Subjective  
Mitesh Braun is a 83 year old male here with daughter. He developed an itchy
 rash of both legs one week ago and he was applying capsaicin and other creams 
with no improvement.  
  
His blood pressure continued to be low, so most medications were discontinued 
other than [id number] mg every other day. His urinary incontinence was 
progressing, and aside from urge incontinence, he had incontinence without 
awareness.  
  
Review of Systems  
Constitutional: Negative for chills and fever.  
Respiratory: Negative for shortness of breath.  
Cardiovascular: Positive for leg swelling. Negative for chest pain and 
palpitations.  
Genitourinary: Positive for urgency.  
Incontinence.  
  
ACTIVE PROBLEM LIST  
Essential hypertension  
Caren On Cpap  
Esophageal Reflux  
Gout  
Mixed hyperlipidemia  
Impaired Fasting Glucose  
Aortic Valve Disorder  
Polycystic Kidney  
Adenomatous Colon Polyp  
Thrombocytopenia (HCC)  
Cad (Coronary Artery Disease), Native Coronary Artery  
Adjustment Disorder With Depressed Mood  
Asthma Exacerbation  
Peripheral Polyneuropathy  
Splenomegaly  
Ckd (Chronic Kidney Disease) Stage 3, Gfr 30-59 Ml/Min (Hcc)  
Abnormality of Gait  
Anemia of Chronic Disease  
Urge Incontinence of Urine  
Obesity, Class II, Bmi 35-39.9  
Frequent Falls  
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  
Iron Malabsorption  
Liver Lesion  
  
Current Outpatient Medications  
Medication Sig  
furosemide (LASIX) 20 mg tablet TAKE 1 TABLET BY MOUTH EVERY OTHER RDAY  
ascorbic acid, vitamin C, (VITAMIN C) 500 mg tablet Take 1 tablet by mouth once 
daily.  
LORazepam (ATIVAN) 1 mg tablet Take 1 tablet by mouth 1 hour before MRI  
ascorbic acid-elderberry fruit 100-50 mg chew Take 2 tablets by mouth once 
daily.  
cyanocobalamin (VITAMIN B-12) 2,500 mcg tablet Take 2,500 mcg by mouth once 
daily.  
melatonin 10 mg tab Take 1 tablet by mouth as needed.  
aspirin, enteric coated (ASPIRIN, ENTERIC COATED) 81 mg EC tablet Take 81 mg by 
mouth once daily.  
albuterol HFA (PROVENTIL HFA, VENTOLIN HFA) 90 mcg/actuation inhaler Inhale 2 
Puffs as instructed every 4 hours as needed for wheezing/shortness of breath.  
omeprazole (PRILOSEC) 40 mg capsule Take 1 capsule by mouth once daily.  
allopurinol (ZYLOPRIM) 300 mg tablet Take 1 tablet by mouth once daily.  
magnesium oxide 400 mg magnesium cap Take 1 capsule by mouth once daily.  
fenofibrate nanocrystallized (TRICOR) 48 mg tablet Take 1 tablet by mouth once 
daily.  
sertraline (ZOLOFT) 50 mg tablet Take 1 tablet by mouth once daily.  
nitroglycerin sublingual (NITROQUICK) 0.4 mg SL tablet Dissolve 1 tablet under 
the tongue every 5 minutes as needed for chest pain. FOR CHEST PAIN. IF NO 
RELIEF CALL 911  
fluticasone (FLONASE) 50 mcg/actuation nasal spray Use 1 Spray in each nostril 
once daily.  
ferrous sulfate 325 mg (65 mg iron) EC tablet Take 1 tablet by mouth twice daily
 with meals. (Patient taking differently: Take 325 mg by mouth once daily.)  
oxybutynin ER (DITROPAN XL) 15 mg 24 hr Extended Rel Tab Take 2 tablets by mouth
 once daily.  
amoxicillin (POLYMOX, AMOXIL) 500 mg capsule Take four tablets, one hour prior 
to dental work (Patient taking differently: Take four tablets by mouth , one 
hour prior to dental work)  
fluticasone-vilanterol (BREO ELLIPTA) 200-25 mcg/dose inhaler Inhale 1 
Inhalation as instructed once daily. Inhale one puff once daily. DO NOT CLICK 
OPEN UNTIL READY FOR DOSE  
multivitamin tablet Take 1 tablet by mouth once daily.  
torsemide (DEMADEX) 20 mg tablet Take 1 tablet by mouth once daily for 5 days.  
doxycycline (VIBRA-TABS) 100 mg tablet Take 1 tablet by mouth twice daily for 7 
days.  
Diaper,Brief, Adult,Disposable (DEPEND EASY FIT UNDERGARMENTS) 1 Each three 
times daily.  
  
No current facility-administered medications for this visit.  
  
  
Objective  
/64 (BP Site: Left Arm, BP Position: Sitting, BP Cuff Size: Large Adult)  
 Pulse 76   Temp 36.2 C (97.2 F) (Temporal)   Resp 16   Wt 111.6 kg (246 lb)   
BMI 38.53 kg/m  
Physical Exam  
Constitutional:  
General: He is not in acute distress.  
Appearance: He is not ill-appearing.  
Cardiovascular:  
Rate and Rhythm: Normal rate and regular rhythm.  
Heart sounds: No murmur heard.  
Pulmonary:  
Effort: No respiratory distress.  
Breath sounds: No wheezing or rales.  
Musculoskeletal:  
General: No tenderness.  
Right lower leg: 3+ Edema present.  
Left lower le+ Edema present.  
Skin:  
Findings: Erythema and rash present.  
Comments: Linear excoriations with erythema on both lower legs, no seepage, no 
ulcers, no drainage,some warmth, right more than left.  
Neurological:  
Mental Status: He is alert.  
Gait: Gait abnormal.  
  
Assessment and Plan  
  
1. Stasis dermatitis of both legs - ICD9: 454.1, ICD10: I87.2 (primary 
diagnosis)  
- TORSEMIDE 20 MG TABLET  
  
2. Local skin infection - ICD9: 686.9, ICD10: L08.9  
- Begin treatment with  
- DOXYCYCLINE HYCLATE 100 MG TABLET  
  
3. Urge incontinence of urine - ICD9: 788.31, ICD10: N39.41  
- DEPEND EASY FIT UNDERGARMENTS MISC  
  
4. Peripheral polyneuropathy - ICD9: 356.9, ICD10: G62.9  
- DEPEND EASY FIT UNDERGARMENTS MISC  
  
Further recommendations will depend on response by next week.  
  
Júnior Ahuja MD  
  
Electronically signed by Júnior Ahuja MD at 2023 4:59 PM EST  
  
documented in this encounterLicking [facility]01- NoteOhio[facility]01- History of Present illness Narrative* Nathalia Bernal PA-C - 
  2023 1:38 PM ESTFormatting of this note is different from the original.  
HISTORY AND PHYSICAL  
  
Mitesh ARAUZ Kade  
1940  
  
REFERRING PHYSICIAN: Tello Ortega DO  
  
CHIEF COMPLAINT: Consult (colonoscopy)  
  
HPI: The patient is a 83 year old male referred for endoscopy. Mitesh notes 
iron deficiency anemiaand was referred by hem/onc for endoscopic evaluation of 
possible GI source of bleeding. Patient denies any change in bowel habits, 
weight changes, blood in stools, black tarry stools or abdominal pain. Denies 
family history of colon issues. The patient NOTES acid reflux.  
  
Mitesh has undergone prior endoscopy. Last EGD was in 2020 by Dr. Sandoval with findings ofgastritis, esophagitis, hiatal hernia. Last colonoscopy 
18 by Dr. Rios with removal of multiple adenomatous polyps, three year 
follow-up recommended.  
  
Patient's past medical history is significant for cirrhosis for which he follows
 with Gastroenterology. Notes mention possibility of esophageal varices which 
may require banding.  
  
PAST MEDICAL HISTORY  
Diagnosis Date  
Adenomatous colon polyp 8/3/2011  
Adjustment disorder with depressed mood 3/9/2012  
Aortic valve disorders 2007  
stenosis  
Asthma exacerbation 11/10/2012  
Calculus of ureter 2005  
Esophageal reflux 10/14/2011  
Essential hypertension 10/14/2005  
Impaired renal function 4/15/2010  
Impotence of organic origin 2006  
Intestinal polyp 8/3/2011  
Nonspecific abnormal results of liver function study 2006  
Obesity, unspecified  
CAREN on CPAP 10/13/2005  
Other abnormal blood chemistry  
Hyperuricemia  
Other and unspecified hyperlipidemia 10/14/2005  
Peripheral polyneuropathy 2/3/2015  
Polycystic kidney 2010  
Snoring  
Splenomegaly 9/10/2014  
Thrombocytopenia (HCC) 10/5/2011  
  
  
PAST SURGICAL HISTORY  
Procedure Laterality Date  
ARTHRP KNE CONDYLE&PLATU MEDIAL&LAT COMPARTMENTS Left 2017  
BAL. ASSIST ENTEROSCOPY W/ BX 2011  
CAPSULE ENDO SMALL BOWEL W EGD 2011  
COLONOSCOPY FLX DX W/COLLJ SPEC WHEN PFRMD   
Colonoscopy  
COLONOSCOPY FLX DX W/COLLJ SPEC WHEN PFRMD   
Colonoscopy  
COLONOSCOPY FLX DX W/COLLJ SPEC WHEN PFRMD 2011  
Colonoscopy  
COLONOSCOPY FLX DX W/COLLJ SPEC WHEN PFRMD 2018  
Colonoscopy  
COLONOSCOPY W/BIOPSY SINGLE/MULTIPLE 2008  
ESOPHAGOGASTRODUODENOSCOPY TRANSORAL DIAGNOSTIC 2018  
EGD  
ESOPHAGOGASTRODUODENOSCOPY TRANSORAL DIAGNOSTIC 2019  
EGD  
ESOPHAGOGASTRODUODENOSCOPY TRANSORAL DIAGNOSTIC 2020  
EGD  
EXCISION PILONIDAL CYST/SINUS SIMPLE 1964  
HEMORRHOIDECTOMY INTERNAL RUBBER BAND LIGATIONS 2018  
824 and 18  
OPTX FEM SHFT FX W/INSJ IMED IMPLT W/WO SCREW Left 2020  
L hip cephalo medullary nail.  
RPLCMT AORTIC VALVE OPN W/STENTLESS TISSUE VALVE 10/04/2011  
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  
RPR UMBILICAL HRNA 5 YRS/> REDUCIBLE 09/10/2010  
Hernia repair, umbilical >5yr  
  
Current Outpatient Medications  
Medication Sig  
furosemide (LASIX) 20 mg tablet TAKE 1 TABLET BY MOUTH EVERY OTHER RDAY  
atenolol (TENORMIN) 50 mg tablet Take 1 tablet by mouth once daily.  
ascorbic acid, vitamin C, (VITAMIN C) 500 mg tablet Take 1 tablet by mouth once 
daily.  
ascorbic acid-elderberry fruit 100-50 mg chew Take 2 tablets by mouth once 
daily.  
cyanocobalamin (VITAMIN B-12) 2,500 mcg tablet Take 2,500 mcg by mouth once 
daily.  
melatonin 10 mg tab Take 1 tablet by mouth as needed.  
aspirin, enteric coated (ASPIRIN, ENTERIC COATED) 81 mg EC tablet Take 81 mg by 
mouth once daily.  
albuterol HFA (PROVENTIL HFA, VENTOLIN HFA) 90 mcg/actuation inhaler Inhale 2 
Puffs as instructed every 4 hours as needed for wheezing/shortness of breath.  
omeprazole (PRILOSEC) 40 mg capsule Take 1 capsule by mouth once daily.  
allopurinol (ZYLOPRIM) 300 mg tablet Take 1 tablet by mouth once daily.  
magnesium oxide 400 mg magnesium cap Take 1 capsule by mouth once daily.  
fenofibrate nanocrystallized (TRICOR) 48 mg tablet Take 1 tablet by mouth once 
daily.  
sertraline (ZOLOFT) 50 mg tablet Take 1 tablet by mouth once daily.  
nitroglycerin sublingual (NITROQUICK) 0.4 mg SL tablet Dissolve 1 tablet under 
the tongue every 5 minutes as needed for chest pain. FOR CHEST PAIN. IF NO 
RELIEF CALL 911  
fluticasone (FLONASE) 50 mcg/actuation nasal spray Use 1 Spray in each nostril 
once daily.  
ferrous sulfate 325 mg (65 mg iron) EC tablet Take 1 tablet by mouth twice daily
 with meals. (Patient taking differently: Take 325 mg by mouth once daily.)  
oxybutynin ER (DITROPAN XL) 15 mg 24 hr Extended Rel Tab Take 2 tablets by mouth
 once daily.  
amoxicillin (POLYMOX, AMOXIL) 500 mg capsule Take four tablets, one hour prior 
to dental work (Patient taking differently: Take four tablets by mouth , one 
hour prior to dental work)  
betamethasone valerate 0.1 % lotion Apply to affected area twice daily.  
fluticasone-vilanterol (BREO ELLIPTA) 200-25 mcg/dose inhaler Inhale 1 
Inhalation as instructed once daily. Inhale one puff once daily. DO NOT CLICK 
OPEN UNTIL READY FOR DOSE  
multivitamin tablet Take 1 tablet by mouth once daily.  
lisinopril (ZESTRIL, PRINIVIL) 40 mg tablet Take 40 mg by mouth once daily. 
(Patient not taking: Reported on 2023)  
  
No current facility-administered medications for this visit.  
  
ALLERGIES: Bees, Acetaminophen, Hydrocodone, Lipitor [Atorvastatin Calcium], 
Singulair [MontelukastSodium], and Strawberries  
  
PERSONAL HISTORY:  
Social History  
  
Tobacco Use  
Smoking status: Never  
Smokeless tobacco: Never  
Vaping Use  
Vaping Use: Never used  
Substance Use Topics  
Alcohol use: No  
Drug use: No  
  
  
  
FAMILY HISTORY:  
FAMILY HISTORY  
Problem Relation Age of Onset  
Heart Mother  
 in 70's  
Coronary Artery Disease Father  
 in his 80's  
Emphysema Father  
Diabetes Brother  
  
REVIEW OF SYMPTOMS:  
The review of systems data was entered by the nurse and reviewed by me  
  
Nursing Notes:  
Karina Cabrera RN 2023 1:19 PM Signed  
REVIEW OF SYSTEMS:  
General: The patient NOTES fatigue, denies weight loss, denies weight gain, 
denies feeling hot, andNOTES feelings of cold.  
Eyes: The patient denies glaucoma, NOTES eye injury/surgery, wears glasses or 
contacts.  
Ear/Nose/Throat: The patient NOTES allergies, denies hayfever, denies ear 
infections, and denies bloody noses.  
Cardiovascular: The patient denies chest pain, denies heart disease, NOTES high 
blood pressure,denies cardiac stent, denies prior heart attack, denies irregular
heart beat, NOTES high cholesterol, denies poor circulation, NOTESheart failure,
other cardiac issues, denies claudication, denies cold feet, denies peripheral 
arterial stent.  
Respiratory: The patient denies tuberculosis, denies pneumonia, denies frequent 
cough, denies pulmonary embolism, NOTES shortness of breath, and denies coughing
up blood.  
Gastrointestinal: The patient denies difficulty swallowing, NOTES acid reflux, 
denies ulcers, denies vomiting, denies jaundice/hepatitis, denies gallbladder 
problems, denies black or tarry stools, denies hemorrhoids, denies bleeding from
rectum, NOTES diverticulitis, denies constipation, denies diarrhea, denies loss 
of stool control, and denies hernias.  
Kidney/Bladder: The patient denies kidney stones, denies urine infections, and 
denies bloody urine.  
Skin: The patient denies a history of skin cancer, denies bleeding/changing 
moles, and denies a history of skin rash.  
Neurologic: The patient denies a history of epilepsy/convulsions, denies 
headaches, denies head/spinal injuries, and denies stroke/TIA.  
Psychiatric: The patient denies psychiatric medications, denies depression, and 
denies voices, denies substance abuse.  
Endocrine: The patient denies thyroid disorders, denies diabetes, and denies 
hormonal problems.  
Hematologic: The patient denies a history of bruising, denies bleeding, and 
denies anemia, denies blood clots.  
Infections: The patient denies a history of measles and mumps, denies rheumatic 
fever, and denies sexually transmitted diseases.  
Musculoskeletal: The patient denies back pain/injury, denies back problems, 
denies sciatica, NOTES knee/foot trouble, NOTES arthritis, or denies gout.  
  
Last colon   
  
Karina Cabrera RN  
I have confirmed and edited as necessary, the PFSH and ROS obtained by others  
Nathalia Bernal PA-C  
  
  
PHYSICAL EXAMINATION:  
  
General: The patient is 83 year old male, well nourished, well hydrated in no 
acute distress. The patient is oriented to time, place, and person.  
  
VITALS: Blood pressure 140/80, pulse 110, temperature 37.2 C (98.9 F), height 
170.2 cm (5' 7 ), weight 114.3 kg (252 lb), SpO2 94 %. Body mass index is 39.47 
kg/m .  
  
HEENT: Normal cephalic, ataumatic, pupils are equally round, sclera are 
anicteric, mucous membranesare moist, oropharynx is clear. Neck has no masses, 
asymmetry or lymphadenopathy.  
  
Respiratory: Clear to auscultation and percussion. Normal respiratory excursion 
and pattern.  
  
Cardiac: Examination is regular rate and rhythm. Normal S1/S2  
  
Abdominal exam: Soft, nontender, with no palpable masses. No hepatosplenomegaly.
No palpable hernias.  
  
Extremities: no clubbing, cyanosis or edema. No adenopathy.  
  
LABORATORY VALUES: As Noted  
  
RADIOLOGIC STUDIES: As Noted  
  
Assessment  
IMPRESSION: history of colon polyps, anemia, cirrhosis  
  
PLAN: I have reviewed my findings with the surgeon. Patient referred for upper 
and lower endoscopy for evaluation of possible GI bleeding. Patient would 
require MAC due to medical comorbidities. Due to the possibility of esophageal 
varices which may require banding (a procedure not performed by [facility] 
[facility]), would recommend that patient have his upper and lower endoscopy 
performed by a gastroenterologist who could perform banding at time of endoscopy
if needed based on findings. This was relayed to the patient and his daughter, 
who verbalized understanding.  
  
  
Diagnoses: (D50.9) Iron deficiency anemia, unspecified iron deficiency anemia 
type (primary encounter diagnosis)  
(Z86.010) History of colonic polyps  
(Z87.19) History of cirrhosis  
  
Consultation requested by Dr. Ortega for an opinion regarding anemia. My final 
recommendations will be communicated back to the requesting physician by way of 
shared Medical record or letter to requesting physician via US mail.  
  
_____________________________  
Nathalia Bernal PA-C  
  
Electronically signed by Nathalia Bernal PA-C at 2023 8:16 AM EST  
  
documented in this encounterLicking [facility] Hospital01- Nurse Note* Karina Cabrera RN - 2023 1:16 PM ESTFormatting of this note might be 
  different from the original.  
REVIEW OF SYSTEMS:  
General: The patient NOTES fatigue, denies weight loss, denies weight gain, 
denies feeling hot, andNOTES feelings of cold.  
Eyes: The patient denies glaucoma, NOTES eye injury/surgery, wears glasses or 
contacts.  
Ear/Nose/Throat: The patient NOTES allergies, denies hayfever, denies ear 
infections, and denies bloody noses.  
Cardiovascular: The patient denies chest pain, denies heart disease, NOTES high 
blood pressure,denies cardiac stent, denies prior heart attack, denies irregular
heart beat, NOTES high cholesterol, denies poor circulation, NOTESheart failure,
other cardiac issues, denies claudication, denies cold feet, denies peripheral 
arterial stent.  
Respiratory: The patient denies tuberculosis, denies pneumonia, denies frequent 
cough, denies pulmonary embolism, NOTES shortness of breath, and denies coughing
up blood.  
Gastrointestinal: The patient denies difficulty swallowing, NOTES acid reflux, 
denies ulcers, denies vomiting, denies jaundice/hepatitis, denies gallbladder 
problems, denies black or tarry stools, denies hemorrhoids, denies bleeding from
rectum, NOTES diverticulitis, denies constipation, denies diarrhea, denies loss 
of stool control, and denies hernias.  
Kidney/Bladder: The patient denies kidney stones, denies urine infections, and 
denies bloody urine.  
Skin: The patient denies a history of skin cancer, denies bleeding/changing 
moles, and denies a history of skin rash.  
Neurologic: The patient denies a history of epilepsy/convulsions, denies 
headaches, denies head/spinal injuries, and denies stroke/TIA.  
Psychiatric: The patient denies psychiatric medications, denies depression, and 
denies voices, denies substance abuse.  
Endocrine: The patient denies thyroid disorders, denies diabetes, and denies 
hormonal problems.  
Hematologic: The patient denies a history of bruising, denies bleeding, and 
denies anemia, denies blood clots.  
Infections: The patient denies a history of measles and mumps, denies rheumatic 
fever, and denies sexually transmitted diseases.  
Musculoskeletal: The patient denies back pain/injury, denies back problems, 
denies sciatica, NOTES knee/foot trouble, NOTES arthritis, or denies gout.  
  
Last colon 2019  
  
Karina Cabrera RN  
Electronically signed by Karina Cabrera RN at 2023 1:19 PM EST  
  
documented in this encounterLicking Memorial Hospital01- Miscellaneous Notes* 
  Telephone Encounter - Zofia Altamirano [facility] - 01/15/2023 2:42 PM ESTFormatting of 
  this note might be different from the original.  
Unable to reach patient's daughter Dixie, who's number is in chart to call, just
rings and no  or voicemail. We can try again at a later time. Also 
patient is scheduled to be in the office later this week 2023, unable to 
determine based on the note below and patient's appointment desk what things 
have been schedule and what still needs scheduled.  
  
Zofia Altamirano Pss  
  
Electronically signed by Zofia Altamirano Pss at 01/15/2023 2:45 PM EST  
  
* Telephone Encounter - Marilin Sanchez - 2023 4:58 PM ESTSummary: AVS 
  23  
  
Formatting of this note is different from the original.  
Check out comments: Labs today.  
MRI Liver when able--patient would like done at [facility]--open MRI.  
Referral to Dr. Peabody for EGD/colonoscopy--possible varices.  
CT guided bone marrow biopsy at [facility] when able. Hold ASA 1 week prior.  
Referral to hepatology for cirrhosis.  
Schedule for 10 doses iron sucrose.  
CBC/CMP/Myeloma labs then OV after completes iron sucrose.  
  
Electronically signed by Marilin Sanchez at 2023 4:59 PM EST  
  
documented in [facility]- NoteOhio[facility]- History of Present illness Narrative* Maurizio Cochran PA-C - 
  2023 10:20 AM ESTFormatting of this note is different from the original.  
VIRTUAL VISIT FOLLOW UP  
  
I had a virtual visit with Mr. Braun today for follow up of cirrhosis.  
  
UPDATED HISTORY:  
CC: Patient presents with:  
Cirrhosis  
  
HPI: Mr. Braun is a 83 year old with past medical history of colon polyps, 
diverticulosis, internal hemorrhoids gastric ulcer , Esophageal reflux, obesity,
HLP, aortic valve disorder, polycystic kidney, CAREN on CPAP, thrombocytopenia, 
splenomegaly, hypertension, presenting today for evaluation of cirrhosis.  
Patient was last seen by GI CNP Radha Kong 2021.  
Here at request of Dr. Tello Ortega  
Daughter present during the visit  
  
At last visit:  
Upon chart review, had an abdominal CT in  with findings of fatty liver but 
no splenomegaly  
Most recent RUQ US showed a nodular contour of the liver with splenomegaly and a
new hyperechoic nodule in the right lobe of the liver  
An MRI liver w/wo cont has been ordered but not scheduled  
Last INR 2022 was wnl 1.1  
He's had thrombocytopenia since   
Per Dr. Ortega:  
He also has macrocytic anemia and thrombocytopenia. Prior reports of 
splenomegaly. I personally reviewed CT images from  and . In 2014, 
spleen measured up to about 16 cm in craniocaudal dimension. No mention of fatty
liver or other morphologic changes of the liver to suggest cirrhosis.  
  
Interval hx:  
Believes he was told he had liver lesions previously, which were benign.  
He has never been told he has a fatty liver or cirrhosis until recent testing  
May be going for bone marrow biopsy  
Has never had a liver biopsy  
Does fit the metabolic syndrome  
No alcohol use now, never a big drinker  
Has leg swelling, usually progressive as the day goes on, better upon waking, 
sometimes left swellsmore than right  
Eating better now with meals on wheels, likes soup and salads  
Daughter inquiring if he could have hepatic encephalopathy, has memory issues  
  
Risk Factors for Liver Disease:  
1. Blood transfusions before : yes  
2. IVDA: No  
3. Intranasal coccaine use: No  
4. Tattoos: No  
5.  Service: yes, [employer] (received vaccines)  
6. High risk sexual behavior: No  
7. Alcohol: none in older age, only beer in younger years-occasionally socially  
8. Obesity: yes  
9. Hyperlipidemia: yes  
10. Prolonged exposure to hepatotoxic meds: No  
11. Other autoimmune disorders No  
No daily tylenol  
OTC: multivitamin, elderberry gummies, vitamin c, vitamin b12  
  
Metabolic Syndrome Risk factors:   
1) Diabetes/ Abnormal FBS >100mg/dL:yes  
2) Hypertension : yes  
3)Triglycerides more then 150 : yes  
4) HDL (<50 female and <40 male): yes  
5) Central obesity ( Waist >102 men and >88 female) - Body mass index is 38.89 
kg/(m^2).  
Weight is relatively stable, lost weight around 60 pounds when wife passed away  
Appetite is better, on meals on wheels  
  
Complications of Cirrhosis:  
1. Ascites: No  
2. SBP: No  
3. Non-bleeding varices: No  
4. Variceal hemorrage: No  
5. Portosystemic encephalopathy: No  
6. Hepatorenal Syndrome: No  
7. Hepatopulmonary Syndrome: No  
8. Hepatic hydrothorax: No  
9. Recurrent Cholangitis (PSC): No  
  
Family hx: no cirrhosis, liver cancer  
Surgical hx: hip replacement  
  
Denies current issues with ascites, HE, hematemesis, hematochezia, confusion, 
dark urine, josselyn colored stool, nausea, vomiting, weight loss, early satiety, 
bloating, dysphagia, odynophagia, change inbm. Remaining systems reviewed and 
are negative.  
  
MELD-Na score: 7 at 2022 4:21 PM  
MELD score: 7 at 2022 4:21 PM  
Calculated from:  
Serum Creatinine: 1.01 mg/dL at 2022 4:21 PM  
Serum Sodium: 135 mmol/L at 2022 4:21 PM  
Total Bilirubin: 0.5 mg/dL (Using min of 1 mg/dL) at 2022 4:21 PM  
INR(ratio): 1.1 at 2022 4:21 PM  
Age: 82 years  
  
PAST MEDICAL HISTORY  
Diagnosis Date  
Adenomatous colon polyp 8/3/2011  
Adjustment disorder with depressed mood 3/9/2012  
Aortic valve disorders 2007  
stenosis  
Asthma exacerbation 11/10/2012  
Calculus of ureter 2005  
Esophageal reflux 10/14/2011  
Essential hypertension 10/14/2005  
Impaired renal function 4/15/2010  
Impotence of organic origin 2006  
Intestinal polyp 8/3/2011  
Nonspecific abnormal results of liver function study 2006  
Obesity, unspecified  
CAREN on CPAP 10/13/2005  
Other abnormal blood chemistry  
Hyperuricemia  
Other and unspecified hyperlipidemia 10/14/2005  
Peripheral polyneuropathy 2/3/2015  
Polycystic kidney 2010  
Snoring  
Splenomegaly 9/10/2014  
Thrombocytopenia (HCC) 10/5/2011  
  
PAST SURGICAL HISTORY  
Procedure Laterality Date  
ARTHRP KNE CONDYLE&PLATU MEDIAL&LAT COMPARTMENTS Left 2017  
BAL. ASSIST ENTEROSCOPY W/ BX 2011  
CAPSULE ENDO SMALL BOWEL W EGD 2011  
COLONOSCOPY FLX DX W/COLLJ SPEC WHEN PFRMD   
Colonoscopy  
COLONOSCOPY FLX DX W/COLLJ SPEC WHEN PFRMD   
Colonoscopy  
COLONOSCOPY FLX DX W/COLLJ SPEC WHEN PFRMD 2011  
Colonoscopy  
COLONOSCOPY FLX DX W/COLLJ SPEC WHEN PFRMD 2018  
Colonoscopy  
COLONOSCOPY W/BIOPSY SINGLE/MULTIPLE 2008  
ESOPHAGOGASTRODUODENOSCOPY TRANSORAL DIAGNOSTIC 2018  
EGD  
ESOPHAGOGASTRODUODENOSCOPY TRANSORAL DIAGNOSTIC 2019  
EGD  
ESOPHAGOGASTRODUODENOSCOPY TRANSORAL DIAGNOSTIC 2020  
EGD  
EXCISION PILONIDAL CYST/SINUS SIMPLE 1964  
HEMORRHOIDECTOMY INTERNAL RUBBER BAND LIGATIONS 2018 and 18  
OPTX FEM SHFT FX W/INSJ IMED IMPLT W/WO SCREW Left 2020  
L hip cephalo medullary nail.  
RPLCMT AORTIC VALVE OPN W/STENTLESS TISSUE VALVE 10/04/2011  
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  
RPR UMBILICAL HRNA 5 YRS/> REDUCIBLE 09/10/2010  
Hernia repair, umbilical >5yr  
  
FAMILY HISTORY  
Problem Relation Age of Onset  
Heart Mother  
 in 70's  
Coronary Artery Disease Father  
 in his 80's  
Emphysema Father  
Diabetes Brother  
  
Social History  
  
Tobacco Use  
Smoking status: Never  
Smokeless tobacco: Never  
Vaping Use  
Vaping Use: Never used  
Substance Use Topics  
Alcohol use: No  
Drug use: No  
  
Current Outpatient Medications  
Medication Sig Dispense Refill  
iv contrast (will be provided with radiology test) MRI Liver Inject, 
intravenously, once for 1 dose. No IV access, insert saline lock prior to the 
beginning of sedation, infusion, injection of imaging exam. Discontinue saline 
lock post exam. If Pt. has a central line or IVAD, may access for administration
according to line specific nursing protocol. Once exam is complete flush line 
and de-access according to line specific nursing protocol in the MR contrast 
administration guidelines link. 1 Each 0  
ascorbic acid-elderberry fruit 100-50 mg chew Take 2 tablets by mouth once 
daily.  
cyanocobalamin (VITAMIN B-12) 2,500 mcg tablet Take 2,500 mcg by mouth once 
daily.  
melatonin 10 mg tab Take 1 tablet by mouth as needed.  
aspirin, enteric coated (ASPIRIN, ENTERIC COATED) 81 mg EC tablet Take 81 mg by 
mouth once daily.  
albuterol HFA (PROVENTIL HFA, VENTOLIN HFA) 90 mcg/actuation inhaler Inhale 2 
Puffs as instructed every 4 hours as needed for wheezing/shortness of breath.  
omeprazole (PRILOSEC) 40 mg capsule Take 1 capsule by mouth once daily. 90 
capsule 3  
allopurinol (ZYLOPRIM) 300 mg tablet Take 1 tablet by mouth once daily. 90 
tablet 3  
magnesium oxide 400 mg magnesium cap Take 1 capsule by mouth once daily. 90 
capsule 3  
fenofibrate nanocrystallized (TRICOR) 48 mg tablet Take 1 tablet by mouth once 
daily. 90 tablet 3  
sertraline (ZOLOFT) 50 mg tablet Take 1 tablet by mouth once daily. 90 tablet 3  
nitroglycerin sublingual (NITROQUICK) 0.4 mg SL tablet Dissolve 1 tablet under 
the tongue every 5 minutes as needed for chest pain. FOR CHEST PAIN. IF NO 
RELIEF CALL 911 25 tablet 1  
fluticasone (FLONASE) 50 mcg/actuation nasal spray Use 1 Spray in each nostril 
once daily. 3 Each 3  
ferrous sulfate 325 mg (65 mg iron) EC tablet Take 1 tablet by mouth twice daily
with meals. (Patient taking differently: Take 325 mg by mouth once daily.) 180 
tablet 1  
oxybutynin ER (DITROPAN XL) 15 mg 24 hr Extended Rel Tab Take 2 tablets by mouth
once daily. 180 tablet 3  
amoxicillin (POLYMOX, AMOXIL) 500 mg capsule Take four tablets, one hour prior 
to dental work (Patient taking differently: Take four tablets by mouth , one 
hour prior to dental work) 4 capsule 1  
betamethasone valerate 0.1 % lotion Apply to affected area twice daily. 60 mL 0  
fluticasone-vilanterol (BREO ELLIPTA) 200-25 mcg/dose inhaler Inhale 1 
Inhalation as instructed once daily. Inhale one puff once daily. DO NOT CLICK 
OPEN UNTIL READY FOR DOSE 0  
multivitamin tablet Take 1 tablet by mouth once daily. 0  
  
No current facility-administered medications for this visit.  
  
ALLERGIES  
Allergen Reactions  
Bees Anaphylaxis  
Acetaminophen Other: See Comments  
Hydrocodone GI Upset  
Lipitor [Atorvastat* Itching  
Singulair [Monteluk* Itching  
Strawberries Hives  
hives  
  
REVIEW OF SYSTEMS:  
PAIN ASSESSMENT: Negative for pain, history of chronic pain, or current 
treatment for a chronic pain condition.  
  
GENERAL: No weight loss, malaise or fevers  
RESPIRATORY: Negative for cough, hemoptysis, wheezing, COPD, dyspnea or 
shortness of breath  
CARDIOVASCULAR: Negative for chest pain, leg swelling, CHF or palpitations  
GI: No nausea, vomiting, or diarrhea  
: Not reviewed  
GYN: Not reviewed  
  
PHYSICAL FINDINGS OF NOTE:  
General - Normal, healthy, cooperative, in no acute distress  
Able to interact verbally by video conference  
Psych - ORIENTATION: normal to time place, person and situation  
Mood/Affect: AFFECT AND MOOD: Normal  
Head/Neuro - Normal size and shape Facial appearance normal  
Pulmonary - respiratory effort normal  
Cardiovascular - patient describes extremities normal and warm  
Abdominal - Not performed  
Skin - abnormal lesions not visualized  
Motor - patient seen sitting with Normal appearing strength and coordination  
  
REVIEWED ITEMS  
RUQ US 2022:  
IMPRESSION:  
1. There is a new hyperechoic nodule in the right lobe of the liver  
which could be further evaluated by MRI  
2. Nodular contour of the liver which may be due to cirrhosis  
3. Splenomegaly  
  
EGD 2020  
Findings:  
The examined jejunum was normal.  
The examined duodenum was normal.  
Localized moderate inflammation characterized by erosions, erythema  
and friability was found in the gastric antrum. Biopsies were taken  
with a cold forceps for histology.  
A medium-sized hiatal hernia was present.  
Non-severe esophagitis with no bleeding was found.  
Impression: - Normal examined jejunum.  
- Normal examined duodenum.  
- Gastritis. Biopsied.  
- Medium-sized hiatal hernia.  
- Non-severe reflux esophagitis.  
Recommendation: - Discharge patient to home.  
- Resume previous diet.  
- Continue present medications.  
- Return to physician assistant in 1 week.  
  
Colonoscopy 2018:  
Findings:  
Four sessile polyps were found in the transverse colon. The polyps  
were 6 to 9 mm in size. These polyps were removed with a cold snare.  
Resection and retrieval were complete. Estimated blood loss was  
minimal. To prevent bleeding post-intervention, two hemostatic clips  
were successfully placed.  
Impression: - Four 6 to 9 mm polyps in the transverse colon,  
removed with a cold snare. Resected and retrieved.  
Clips were placed.  
Recommendation: - Discharge patient to home.  
- Written discharge instructions were provided to  
the patient.  
- Resume previous diet.  
- Await pathology results.  
- Repeat colonoscopy in 3 years for surveillance.  
- Patient has a contact number available for  
emergencies. The signs and symptoms of potential  
delayed complications were discussed with the  
patient. Return to normal activities tomorrow.  
Written discharge instructions were provided to the  
patient.  
  
IMPRESSION  
Mr. Braun is a 83 year old with past medical history of colon polyps, 
diverticulosis, internal hemorrhoids gastric ulcer , Esophageal reflux, obesity,
 HLP, aortic valve disorder, polycystic kidney, CAREN on CPAP, thrombocytopenia, 
splenomegaly, hypertension, presenting today for evaluation of cirrhosis. Noted 
to have a cirrhotic appearing liver on recent RUQ US, has had thrombocytopenia 
for years,will be undergoing bone marrow biopsy. He fits the metabolic syndrome,
 placing him at risk for CÁRDENAS/NAFLD with progression to cirrhosis. Discussed 
definitive diagnosis involves a transjugular liver biopsy but he agreed to defer
 such an invasive procedure. Will monitor him as if he has cirrhosis so as to 
stay on top of potential complications, of which he has none at present, leg 
swelling is likely cardiac in nature. Discussed pathophysiology of cirrhosis 
along with signs of decompensation.  
  
RECOMMENDATION:  
MELD labs with chronic liver dx panel  
Liver lesion/HCC Screen: MRI liver w/wo cont now (may need general anesthesia at
 [facility] vs lorazepam 1 hour before MRI, will prescribe if needed, once 
scheduled). Discussed HCC Screening needed every 6 months  
Portal HTN/EV screen: EGD now, has upcoming apt to discuss EGD/colonoscopy, will
 let me know results of that visit and I will prescribe EGD if needed, may need 
variceal banding  
HE: none, discussed trial of lactulose for goal of ~3bm daily, will let me know 
if interested  
LE edema/ Ascites: none, diuretics as prescribed. Discussed <2,000 mg sodium 
diet  
Continue abstinence from alcohol, no issue for him  
Screening colonoscopy: to be discussed at upcoming visit  
HCM/Immunizations: check immunity for HAV/HBV And vaccine if needed  
Metabolic syndrome: tight control essential, to be monitored/treated by PCP. 
Weight loss 5-10% bodyweight helpful. Mediterranean diet/increased 
cardiovascular exercise with goal >45 minutes 5 days weekly  
Avoid liver detox cleanse/supplements  
Not to exceed 2,000 mg tylenol daily  
  
Follow up once blood work, EGD and liver MRI are completed, can be virtual.  
  
I spent a total of 45 minutes on the date of the service which included 
preparing to see the patient, face-to-face patient care, completing clinical 
documentation, obtaining and/or reviewing separately obtained history, 
performing a medically appropriate examination, counseling and educating the pat
ient/family/caregiver, ordering medications, tests, or procedures, communicating
 with other HCPs (not separately reported), and communicating results to the 
patient/family/caregiver.  
  
Maurizio Cochran PA-C  
2023 11:17 AM  
  
  
  
Electronically signed by Maurizio Cochran PA-C at 2023 11:20 AM EST  
  
documented in this encounterL[facility]01- NoteOhio[facility]12- NoteOhio[facility]12- History of Present 
illness Narrative* Siria Daly RDMS - 2022 9:15 AM ESTFormatting of 
  this note might be different from the original.  
Radiology Service Progress Note  
  
PATIENT NAME: Mitesh Braun  
MRN: [id number]  
  
DATE OF SERVICE: 2022  
TIME: 10:00 AM  
PATIENT IDENTITY VERIFICATION COMPLETED USING TWO (2) IDENTIFIERS: Name and Date
 of Birth confirmedby patient verbally.  
FALL SCREENING: Has the patient had 2 falls in the last year or 1 fall with 
injury or currently using an Ambulatory Assistive Device (Walker, Cane, 
Wheelchair, Crutches, etc.)? Yes, Patient High Riskfor Falls  
What interventions were put in place to prevent falls during this visit? 
Instructed Patient to Callfor Help if Needed, Offered Assistance with 
Transfers/Clothing, Instructed Patient to Remain Seated(Not on Exam Table) Until
 Exam, and Increased Observations by Caregivers  
PATIENT GENDER DATA: Male  
PATIENT RELEVANT IMPLANT DATA REVIEWED: Not Applicable  
  
RADIOLOGY DEPARTMENT: Ultrasound  
  
PERIPHERAL IV DATA: Not applicable  
  
SIGNED BY: Siria Daly RDMS RVT  
2022 10:00 AM  
  
  
Electronically signed by Siria Daly RDMS at 2022 10:00 AM EST  
  
documented in this encounterLicking Memorial Hospital12- NoteOhio[facility]12- Note[facility]12- History of Present 
illness Narrative* Tello Ortega,  - 2022 3:17 PM ESTFormatting of this 
  note is different from the original.  
Patient referred by SAMIA Zurita for anemia. The impression and plan 
will be communicated by way of the shared electronic record or faxed under 
separate cover letter.  
  
HPI: The patient is an 83 yo male with a PMH significant for HTN, polycystic 
kidney disease (seeingnephrology for ~15 years), HLD, aortic repair (bovine 
valve), CAD, CAREN (CPAP), asthma, hypercalcemia, splenomegaly (noted on CT chest 
2014; spleen up to 18.4 cm in AP dimension; not enlarged on CT enterography 
) and obesity.  
  
He had lab work ordered at his nephrologist office. Protein electrophoresis of 
the urine demonstrated no evidence of monoclonal spike. Urine protein creatinine
 ratio was elevated. Chemistry panel showed a creatinine of 1.33 mg/dL. Calcium 
mildly elevated 10.6 mg/dL. Vitamin D39.7 PTH 93.4 CBC significant for a total 
white count of 4900. No differential performed. Hemoglobin 9.0 g/dL. . 
Platelet count 79,000. Protein electrophoresis of the serum revealed a M spike 
but no immunofixation was performed.  
  
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 Worse over time.
 He's never been given an answer as to why.  
  
Balance is off and he uses wheeled walker.  
  
[place]. Has home health aides for cleaning and washing 
clothes [facility]. Daughter lives close by and he gets meals on wheels.  
  
Was having left posterior hip pain and got relief with Absorsene Jr.  
  
PAST MEDICAL HISTORY  
Diagnosis Date  
Adenomatous colon polyp 8/3/2011  
Adjustment disorder with depressed mood 3/9/2012  
Aortic valve disorders 2007  
stenosis  
Asthma exacerbation 11/10/2012  
Calculus of ureter 2005  
Esophageal reflux 10/14/2011  
Essential hypertension 10/14/2005  
Impaired renal function 4/15/2010  
Impotence of organic origin 2006  
Intestinal polyp 8/3/2011  
Nonspecific abnormal results of liver function study 2006  
Obesity, unspecified  
CAREN on CPAP 10/13/2005  
Other abnormal blood chemistry  
Hyperuricemia  
Other and unspecified hyperlipidemia 10/14/2005  
Peripheral polyneuropathy 2/3/2015  
Polycystic kidney 2010  
Snoring  
Splenomegaly 9/10/2014  
Thrombocytopenia (HCC) 10/5/2011  
  
PAST SURGICAL HISTORY  
Procedure Laterality Date  
ARTHRP KNE CONDYLE&PLATU MEDIAL&LAT COMPARTMENTS Left 2017  
BAL. ASSIST ENTEROSCOPY W/ BX 2011  
CAPSULE ENDO SMALL BOWEL W EGD 2011  
COLONOSCOPY FLX DX W/COLLJ SPEC WHEN PFRMD   
Colonoscopy  
COLONOSCOPY FLX DX W/COLLJ SPEC WHEN PFRMD   
Colonoscopy  
COLONOSCOPY FLX DX W/COLLJ SPEC WHEN PFRMD 2011  
Colonoscopy  
COLONOSCOPY FLX DX W/COLLJ SPEC WHEN PFRMD 2018  
Colonoscopy  
COLONOSCOPY W/BIOPSY SINGLE/MULTIPLE 2008  
ESOPHAGOGASTRODUODENOSCOPY TRANSORAL DIAGNOSTIC 2018  
EGD  
ESOPHAGOGASTRODUODENOSCOPY TRANSORAL DIAGNOSTIC 2019  
EGD  
ESOPHAGOGASTRODUODENOSCOPY TRANSORAL DIAGNOSTIC 2020  
EGD  
EXCISION PILONIDAL CYST/SINUS SIMPLE 1964  
HEMORRHOIDECTOMY INTERNAL RUBBER BAND LIGATIONS 2018 and 18  
OPTX FEM SHFT FX W/INSJ IMED IMPLT W/WO SCREW Left 2020  
L hip cephalo medullary nail.  
RPLCMT AORTIC VALVE OPN W/STENTLESS TISSUE VALVE 10/04/2011  
25-mm Liza-Stcak pericardial prosthesis via upper ministernotomy.  
RPR UMBILICAL HRNA 5 YRS/> REDUCIBLE 09/10/2010  
Hernia repair, umbilical >5yr  
  
ALLERGIES  
Allergen Reactions  
Bees Anaphylaxis  
Acetaminophen Other: See Comments  
Hydrocodone GI Upset  
Lipitor [Atorvastat* Itching  
Singulair [Monteluk* Itching  
Strawberries Hives  
hives  
  
Current Outpatient Medications  
Medication Sig  
ascorbic acid-elderberry fruit 100-50 mg chew Take 2 tablets by mouth once 
daily.  
cyanocobalamin (VITAMIN B-12) 2,500 mcg tablet Take 2,500 mcg by mouth once 
daily.  
melatonin 10 mg tab Take 1 tablet by mouth as needed.  
aspirin, enteric coated (ASPIRIN, ENTERIC COATED) 81 mg EC tablet Take 81 mg by 
mouth once daily.  
albuterol HFA (PROVENTIL HFA, VENTOLIN HFA) 90 mcg/actuation inhaler Inhale 2 
Puffs as instructed every 4 hours as needed for wheezing/shortness of breath.  
omeprazole (PRILOSEC) 40 mg capsule Take 1 capsule by mouth once daily.  
allopurinol (ZYLOPRIM) 300 mg tablet Take 1 tablet by mouth once daily.  
magnesium oxide 400 mg magnesium cap Take 1 capsule by mouth once daily.  
fenofibrate nanocrystallized (TRICOR) 48 mg tablet Take 1 tablet by mouth once 
daily.  
sertraline (ZOLOFT) 50 mg tablet Take 1 tablet by mouth once daily.  
nitroglycerin sublingual (NITROQUICK) 0.4 mg SL tablet Dissolve 1 tablet under 
the tongue every 5 minutes as needed for chest pain. FOR CHEST PAIN. IF NO 
RELIEF CALL 911  
fluticasone (FLONASE) 50 mcg/actuation nasal spray Use 1 Spray in each nostril 
once daily.  
ferrous sulfate 325 mg (65 mg iron) EC tablet Take 1 tablet by mouth twice daily
 with meals.  
oxybutynin ER (DITROPAN XL) 15 mg 24 hr Extended Rel Tab Take 2 tablets by mouth
 once daily.  
amoxicillin (POLYMOX, AMOXIL) 500 mg capsule Take four tablets, one hour prior 
to dental work (Patient taking differently: Take four tablets by mouth , one 
hour prior to dental work)  
fluticasone-vilanterol (BREO ELLIPTA) 200-25 mcg/dose inhaler Inhale 1 
Inhalation as instructed once daily. Inhale one puff once daily. DO NOT CLICK 
OPEN UNTIL READY FOR DOSE  
multivitamin tablet Take 1 tablet by mouth once daily.  
betamethasone valerate 0.1 % lotion Apply to affected area twice daily.  
  
No current facility-administered medications for this visit.  
  
Social History  
  
Tobacco Use  
Smoking status: Never  
Smokeless tobacco: Never  
Substance Use Topics  
Alcohol use: No  
Drug use: No  
  
ROS:  
Constitutional: Denies episodes of fever and night sweats.  
Neuro: See above. Believes he may have recently had a stroke. He has mild left 
hemiparesis.  
HEENT: No recent change in voice, vision or hearing.  
Resp: Denies cough, wheeze and hemoptysis. Denies shortness of breath at rest.  
CVS: Denies exertional chest pain, PND, orthopnea. Chronic bilateral lower 
extremity swelling.  
GI: No abdominal pain. Bowels move twice a day. Formed stools. Black from iron. 
No blood observed..  
: Denies dysuria or gross hematuria.  
Endo: Denies hot flashes. Denies polyuria and polydipsia. Denies heat and cold 
intolerance.  
Musculoskeletal: No musculoskeletal pain other than left hip pain as outlined 
above.  
Derm: Denies rash. Denies jaundice and diffuse pruritis.  
Heme: Denies unusual bleeding and unexplained bruising.  
Psych: Normal mood.  
  
PHYSICAL EXAM:  
Vitals: Blood pressure 134/81, pulse 93, temperature 36.7 C (98 F), temperature 
source Temporal, weight 110.9 kg (244 lb 8 oz).  
Well-appearing and in no acute distress.  
EYES: Sclerae are anicteric bilaterally.  
ENT: Oral mucosa is unremarkable. There is no sign of thrush or mucositis.  
LYMPHATIC: There is no palpable cervical, supraclavicular or axillary 
adenopathy.  
RESPIRATORY: Inspiratory breath sounds are of normal intensity in all fields. No
 rales, wheezes or rhonchi.  
CARDIOVASCULAR: Rhythm is regular.  
ABDOMEN: The abdomen is nondistended. No organomegaly. No tenderness.  
Extremities: Bilateral lower extremity swelling. Right somewhat worse than left.
 Overlying pitting.  
SKIN: No jaundice.  
NEUROLOGIC: CNs II-XII are grossly intact. He is not able to step up on the exam
 table on his own. Needs at least a 1 person assist. May have some mild left leg
 weakness but due to his general overall weakness difficult to get a good 
neurologic exam.  
  
ASSESSMENT/PLAN:  
(D47.2) Monoclonal gammopathy (primary encounter diagnosis)  
(D53.9) Macrocytic anemia  
(D69.6) Thrombocytopenia (HCC)  
Assessment:  
-The patient is an 82-year-old male with a past medical history as outlined 
above. He has no history of diabetes but has longstanding worsening sensory 
neuropathy of the feet and left hand. He has polycystic kidney disease as well 
as chronic mild hypercalcemia with elevation of PTH and stable serumcreatinine 
over time. Recently found to have possible serum monoclonal antibody on 
electrophoresis of the serum. Urine negative for monoclonal protein on 
electrophoresis.  
-He also has macrocytic anemia and thrombocytopenia. Prior reports of 
splenomegaly. I personally reviewed CT images from  and . In 2014, 
spleen measured up to about 16 cm in craniocaudal dimension. No mention of fatty
 liver or other morphologic changes of the liver to suggest cirrhosis.  
-I discussed the broad differential of the anemia and thrombocytopenia with the 
patient and his daughter. We also discussed the spectrum of plasma cell 
disorders and the further work-up indicated to accurately diagnose.  
Plan:  
-CBC with staff review.  
-Assess TSH, B12, RBC folate, serum copper and cold agglutinins.  
-Assess serum viscosity.  
-Ultrasound liver and spleen.  
-Repeat protein electrophoresis of serum including immunofixation.  
-24-hour urine collection for protein electrophoresis and immunofixation.  
-Check iron studies in light of his chronic kidney disease.  
-Check coagulation times and fibrinogen rule out low-level DIC is a potential 
cause of thrombocytopenia.  
-OV after above resulted.  
-May need EMG/NCV as part of the work-up depending on results above.  
  
I spent a total of 75 minutes on the date of the service which included 
preparing to see the patient, face-to-face patient care, completing clinical 
documentation, obtaining and/or reviewing separately obtained history, 
performing a medically appropriate examination, counseling and educating the pat
ient/family/caregiver, ordering medications, tests, or procedures, communicating
 with other HCPs (not separately reported), independently interpreting results 
(not separately reported), communicatingresults to the patient/family/caregiver,
 and care coordination (not separately reported).  
  
Tello rOtega DO  
Electronically signed by Tello Ortega DO at 2022 4:00 PM EST  
  
[facility]- [facility] Notes* 
  Telephone Encounter - Chinyere Oliver LPN - 2022 10:01 AM ESTFormatting of
   this note might be different from the original.  
pcp reviewed the rehabilitation and sport therapy note pt was seen there for 
driving assessment.  
Pcp signed form. This has been faxed back to [facility] at [phone]  
  
Electronically signed by Chinyere Oliver LPN at 2022 10:07 AM EST  
  
documented in this encounterLicking [facility]11- NoteHNO ID: [id number]  
Author: Ade Vazquez OT/L  
Service: ?  
Author Type: Occupational Therapist  
Type: Progress Notes  
Filed: 2022 3:31 PM  
Note Text:  
Episode Visit Count: 1  
Start of Care Date: 22  
Onset Date: (R CVA suspected per recent neurologist visit while MRI  
ordered; diagnosis also of peripheral polyneuropathy)  
Patient Identified by Name and Date of Birth: Yes  
REHABILITATION AND SPORTS THERAPY  
OCCUPATIONAL THERAPY INSTRUMENTAL ADL AND COMMUNITY MOBILITY EVALUATION  
SUBJECTIVE: Mitesh Braun is a 82 year old male seen today for OT  
functional mobility assessment due to diagnosis of peripheral  
polyneuropathy while also suspected R CVA. His daughter was with him and  
provided helpful information throughout. She shared that he has taken  
some significant falls earlier this year and that he had aide assist for  
some time but increased it to 4 days/week after his fall in August outside  
then again 1 week later. More recent fall 3 weeks ago while at high risk  
for falling. She shared that his wife passed away 2020 so he  
has been living on his own since they but she has increased her  
involvement and support in the time since.  
Functional Limitations: walking in the community;walking;bending;heavy  
exertion;stair negotiation;driving  
Prior Level of Function: Required assistance  
Home Environment  
Patient Lives With: Self/Alone  
Assistance Available: Part-Time;Home Aide (daughter involved living around  
the corner from patient and seeing him/checking in frequently)  
Home Type: Multi-Level  
Transportation: Travels as a passenger;SUV  
Patient Goals: to return to driving  
Intake Information: Prescription present  
Falls Interview: Two or more falls in the last year  
Relevant History  
Past Relevant Medical Conditions: Arthritis;Cerebral Vascular  
Accident;Falls;Hypertension;Depression;Neuropathy  
Highest Level of Education: Trade School  
Preferred Language: English  
Right or Left Handed: Right  
Employment: Retired (was )  
Recreation / Current Exercise: no current exercise  
Hobbies / Interests: reads devotionals and cookbooks; rather sedentary  
Home Environment  
Patient Lives With: Self/Alone  
Assistance Available: Part-Time;Home Aide (daughter involved living around  
the corner from patient and seeing him/checking in frequently)  
Home Type: Multi-Level  
Transportation: Travels as a passenger;SUV  
Activities of Daily Living: Modified Independent and supervision for  
showering  
Instrumental Activities of Daily Living: assist for most tasks in the  
home while some light meal preparation on his own; does take his own  
medication but his daughter sets up as well as manages the finances which  
she has been doing since his wife passed away  
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  
State: [place] License/Permit #: [id number] Expires: 25  
Restrictions: corrective lenses; intrastate CDL  
5 Yr. Violation HX: no 5 Yr. MVA HX: no  
Handicap Parking Placard: YES  
_____________________________________________________________________  
1. Mitesh Braun self report indicates an awareness of: Weakness,  
incoordination, or limited motion in L arm and B legs  
Decreased balance  
Tingling or Numbness in: B hands and feet  
Forgetting new information  
2. Mitesh Braun expressed confidence regarding driving when driving  
alone and on familiar road ways.  
3. Mitesh Braun expressed concerns regarding driving at  
night/decreased light conditions, in congested traffic, on the interstate,  
and on long trips.  
OBJECTIVE MEASURES WITH LEVEL OF FUNCTION:  
SENSORIMOTOR ASSESSMENT:  
Hand dominance: Right  
Level of Function Relevant to I ADL, Community Mobility, and Driving:  
Right UE: Sufficient  
Left UE: Marginal while significant contractures/tone in L hand  
: functional for R hand while decreased for L hand  
Right LE: Marginal  
Left LE: Sufficient  
Sitting Balance: Sufficient  
Head / Neck: decreased neck ROM including rotation to right  
Ambulation: Insufficient while requires rollator to maximize safety with  
repeated history of falls in last several months with increased  
vulnerability if out in the community on own  
Transfers: Marginal  
Loading of Device: NA while did not observe him trying to fold/stow his  
rollator while decreased balance issues  
ASSESSMENT OF SENSORIMOTOR FUNCTION: Marginal for Driving  
_____________________________________________________________________  
VISION SCREENING:  
Vision  
Vision Deficits: Wears corrective lenses  
Corrective lenses: bifocals with correction for close up only; does not  
wear always; has had B cataract surgery in past; reports having floaters  
in both eyes  
Corrective Lenses:  
Wears glasses / contact lenses for driving  
Distant Acuity:  
Binocular: 20 / 40  
Nighttime Glare:  
Right: 20 / 40-1  
Left: 20 / 50-1  
Color Perception: pass  
Depth Perception: Pass  
Contrast Sensitivity: mini (more content not included)...[facility]
2022 History of Present illness Narrative* Ade Vazquez OT/MAYANK - 
  2022 4:50 PM ESTFormatting of this note might be different from the 
  original.  
Episode Visit Count: 1  
Start of Care Date: 22  
Onset Date: (R CVA suspected per recent neurologist visit while MRI ordered; 
diagnosis also of peripheral polyneuropathy)  
Patient Identified by Name and Date of Birth: Yes  
  
REHABILITATION AND SPORTS THERAPY  
OCCUPATIONAL THERAPY INSTRUMENTAL ADL AND COMMUNITY MOBILITY EVALUATION  
  
SUBJECTIVE: Mitesh Braun is a 82 year old male seen today for OT functional 
mobility assessmentdue to diagnosis of peripheral polyneuropathy while also 
suspected R CVA. His daughter was with himand provided helpful information 
throughout. She shared that he has taken some significant falls earlier this 
year and that he had aide assist for some time but increased it to 4 days/week 
after his fall in August outside then again 1 week later. More recent fall 3 
weeks ago while at high risk for falling. She shared that his wife passed away 
2020 so he has been living on his own since they but she has 
increased her involvement and support in the time since.  
  
Functional Limitations: walking in the community;walking;bending;heavy 
exertion;stair negotiation;driving  
  
Prior Level of Function: Required assistance  
  
Home Environment  
Patient Lives With: Self/Alone  
Assistance Available: Part-Time;Home Aide (daughter involved living around the 
corner from patient and seeing him/checking in frequently)  
Home Type: Multi-Level  
Transportation: Travels as a passenger;SUV  
  
Patient Goals: to return to driving  
  
Intake Information: Prescription present  
  
  
Falls Interview: Two or more falls in the last year  
  
Relevant History  
Past Relevant Medical Conditions: Arthritis;Cerebral Vascular 
Accident;Falls;Hypertension;Depression;Neuropathy  
Highest Level of Education: Trade School  
Preferred Language: English  
Right or Left Handed: Right  
Employment: Retired (was )  
Recreation / Current Exercise: no current exercise  
Hobbies / Interests: reads devotionals and cookbooks; rather sedentary  
Home Environment  
Patient Lives With: Self/Alone  
Assistance Available: Part-Time;Home Aide (daughter involved living around the 
corner from patient and seeing him/checking in frequently)  
Home Type: Multi-Level  
Transportation: Travels as a passenger;SUV  
  
  
Activities of Daily Living: Modified Independent and supervision for showering  
Instrumental Activities of Daily Living: assist for most tasks in the home while
 some light meal preparation on his own; does take his own medication but his 
daughter sets up as well as manages the finances which she has been doing since 
his wife passed away  
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  
State: [place] License/Permit #: [id number] Expires: 25  
Restrictions: corrective lenses; intrastate CDL  
5 Yr. Violation HX: no 5 Yr. MVA HX: no  
Handicap Parking Placard: YES  
_____________________________________________________________________  
  
1. Mitesh Braun self report indicates an awareness of: Weakness, 
incoordination, or limited motion in L arm and B legs  
Decreased balance  
Tingling or Numbness in: B hands and feet  
Forgetting new information  
2. Mitesh Braun expressed confidence regarding driving when driving alone 
and on familiar road ways.  
3. Mitesh Braun expressed concerns regarding driving at night/decreased 
light conditions, in congested traffic, on the interstate, and on long trips.  
  
OBJECTIVE MEASURES WITH LEVEL OF FUNCTION:  
SENSORIMOTOR ASSESSMENT:  
  
Hand dominance: Right  
Level of Function Relevant to I ADL, Community Mobility, and Driving:  
Right UE: Sufficient  
Left UE: Marginal while significant contractures/tone in L hand  
: functional for R hand while decreased for L hand  
Right LE: Marginal  
Left LE: Sufficient  
Sitting Balance: Sufficient  
Head / Neck: decreased neck ROM including rotation to right  
Ambulation: Insufficient while requires rollator to maximize safety with 
repeated history of falls in last several months with increased vulnerability if
 out in the community on own  
Transfers: Marginal  
Loading of Device: NA while did not observe him trying to fold/stow his rollator
 while decreased balance issues  
  
ASSESSMENT OF SENSORIMOTOR FUNCTION: Marginal for Driving  
_____________________________________________________________________  
  
VISION SCREENING:  
Vision  
Vision Deficits: Wears corrective lenses  
Corrective lenses: bifocals with correction for close up only; does not wear 
always; has had B cataract surgery in past; reports having floaters in both eyes  
  
Corrective Lenses:  
Wears glasses / contact lenses for driving  
Distant Acuity:  
Binocular: 20 / 40  
Nighttime Glare:  
Right: 20 / 40-1  
Left: 20 / 50-1  
  
Color Perception: pass  
Depth Perception: Pass  
Contrast Sensitivity: minimal decrease for R eye while severe impairment for L 
eye  
Peripheral Vision: Not sufficient for driving  
Right Eye: Failed to recognize stimuli: 35 degrees nasally, 55 degrees, 70 
degrees, and 85 degrees  
Left Eye: Failed to recognize stimuli: 70 degrees and 85 degrees  
Pursuits: Inaccurate / Overshoots  
Saccades: Inaccurate / Overshoots  
Convergence: WFL  
Nystagmus: Not Apparent  
Diplopia: No complaints  
Strabismus: No OU  
Cataracts: No OU  
Glaucoma: No. OU  
Other Eye Conditions / Diseases Reported: reports having floaters in B eyes  
  
ASSESSMENT OF VISUAL FUNCTION: Not Suggestive of Safe Driving Potential  
____________________________________________________________________  
  
COGNITIVE / PERCEPTUAL ASSESSMENT:  
  
SHORT BLESSED TEST  
  
Short Blessed Test  
1. What Year Is It Now?: Correct  
2. What Month Is It Now?: Correct  
3. What Time is it? (WIthin 1 hour): Correct  
4. Count Aloud Backwards 20 to 1 (Errors): 0  
5. Months of the Year in Reverse Order (Errors): 2  
6. Memory Phrase (Errors) : 5  
Short Blessed Final Score: 14  
  
Short Blessed Test Scorin-8; Normal to minimal impairment  
9-19; Moderate impairment  
20-28; Severe impairment  
[email or web address].org  
  
Immediate Recall: WFL @ 5/6 digits  
  
Visual Scanning/Attention:  
Trail Making Part B (sec): 319 sec  
50th percentile norm for age group: 70-79; Part A: 80 seconds, Part B: 196 
seconds  
  
Homero Clock Drawing Test: Mitesh ARAUZ Kade correctly included 2/8 criteria for 
this test. He failed to include or correctly place: all 12 hours in correct 
numeric order, starting with 12 at the top  
only the numbers 1-12 (no duplicates, omissions, or foreign markings)  
the numbers equally, or nearly so, from each other  
the numbers equally spaced, or nearly so, from the edge of the [place]  
one clock hand at the two o'clock position  
only two clock hands  
  
According to The Physician's Guide to Assessing and Counseling Older Drivers, 
2003, any incorrect element in the Homero Clock Scoring signals a need for 
intervention.  
  
Motor Free Visual Perception Test: MVPT Total Score: 25  
MVPT Processing time (seconds): 10.1  
Norms: 70-79 y/o: Raw Score 25-35; Processing Time 4.5-7.1 seconds +/- .5 
seconds  
  
Visual Inattention / Unilateral Neglect: Not Apparent  
  
ASSESSMENT OF COGNITIVE / PERCEPTUAL FUNCTION: Not Suggestive of Safe Driving 
Potential  
  
___________________________________________________________________  
Altaf  Simulator:  
Simple Brake Reaction Time: Average Distance: 67 feet (Normal = 60 feet)  
R foot pedal operation method; was provided with instructions and time for 
practice prior to screening  
  
  
Education:  
Education  
Learning Preferences: Explanation  
Barriers: Cognitive Limitations;Hearing Deficit  
Learning/educational needs: Safety;Plan of Care  
Education Provided: Yes, see treatment interventions for education provided  
Education Provided To: Patient;Family (daughter)  
Education Mode/Type: Explanation/Discussion  
Response to Education/Teach Back: States/Identifies  
  
TREATMENT:  
Evaluation  
Self-Care Home Management:  
1: refer to documentation for details  
2: provided education and support to patient and his daughter  
Skilled Intervention: Skilled judgment in the selection of proper modification 
for activity of daily living/home management based on clinical presentation, 
deficits, and needs.  
Educated the patient regarding recommendations and provided written instruction 
to facilitate compliance.  
Reviewed patient specific diagnosis in relation to activities of daily 
living/home management.  
Activity progression based on professional judgement.  
  
PLAN OF CARE:  
  
SUMMARY AND RECOMMENDATIONS  
*The information in this report indicates the ability of the  to operate a
 motor vehicle on this date only. Due to the complex nature of the safe 
operation of a motor vehicle, and considering the demands of integrating 
changing environmental conditions, and visual, cognitive, and physical skills, 
successful completion of this program is not a guarantee of safe driving in the 
future.  
________________________________________________________________  
  
ASSESSMENT OF INSTRUMENTAL ADL AND COMMUNITY MOBILITY: Mitesh Braun presents
 with the diagnosisof peripheral polyneuropathy as well as suspected of R CVA. 
He presents with impairments of overshooting with oculomotor skills, history of 
significant falls/significant fall risk, decreased neck ROM, required aide 
assist for ADL/IADL's, decreased L eye glare far acuity/contrast 
sensitivity/DOES NOT MEET THE VISUAL FIELD SCREENING REQUIREMENTS FOR [facility] 
FOR DRIVING, CLOCK DRAWING SCORE, PERFORMANCE ON VISUAL PERCEPTUAL SCREENING 
INCLUDING FOR RAW SCORE AND SLOWED VISUAL PROCESSING SPEED, DECREASED 
CONCENTRATION AND RECALL MEMORY, MARKED IMPAIRMENT ON ALTERNATING ATTENTION 
TASK.  
  
RECOMMENDATIONS:  
ADL/IADL Recommendations: ongoing assist for self care and home management as 
needed while aides currently coming in 4 days/week  
Driving Recommendations: REFRAIN FROM DRIVING WHILE PERMANENT 
CESSATION/senior living FROM DRIVING INDICATED. THIS THERAPIST WILL PROVIDE 
PHYSICIAN WITH [facility] REPORTING INFORMATION SO THAT LICENSE SUSPENSION PROCESS
 CAN BE INITIATED  
Miller's Visual Field Exam Requested: Yes  
Recommended Complete Eye Exam: Yes  
  
Planned Interventions, Frequency, and Duration: Current Frequency: 1 visit  
Duration: 1 visit  
Total Number of Visits Planned: 0  
  
Patient demonstrates fair understanding of plan of care and treatment. The above
 results were discussed and agreed upon by patient/family.  
  
  
Billing: Total Treatment Time Minutes (timed/untimed) 135 minutes  
Evaluation - Moderate Complexity (50185)  
Self Care / Home Management (40202): 1:1 time: 75 minutes (5 units: 68-82 mins)  
Total time: 135 minutes  
  
CARLOS ENRIQUE Lunsford, CDRS, CDI  
Certified  Rehabilitation Specialist  
  
Electronically signed by CARLOS ENRIQUE Pelaez at 2022 3:31 PM EST  
  
documented in this encounterL[facility]11- Miscellaneous Notes* 
  Telephone Encounter - Inga Gutierres - 2022 11:43 AM ESTFormatting of this
   note might be different from the original.  
Patient scheduled   
Electronically signed by Inga Gutierres at 2022 11:43 AM EST  
  
* Telephone Encounter - Inga Gutierres - 2022 11:23 AM ESTFormatting of this
   note might be different from the original.  
Spoke to patient. He is going to talk to his daughter to see when she can bring 
him. Patient does not drive anymore. Patient or daughter will call back. Call 
back number provided.  
Electronically signed by Inga Gutierres at 2022 11:24 AM EST  
  
* Telephone Encounter - Angela Vazquez - 2022 10:23 AM ESTFormatting of 
  this note might be different from the original.  
Referral faxed to [facility].  
  
Angela Vazquez  
  
Electronically signed by Angela Vazquez at 2022 10:23 AM EST  
  
* Telephone Encounter - Angela Vazquez - 2022 11:41 AM EDTFormatting of 
  this note might be different from the original.  
New Patient referral received and given to Dr. Newby for review.  
  
DX: ABNORMAL ELECTROPHORESIS/LOW HEMOGLOBIN  
  
REF PROV: ALAYNA PUGH  
  
INS: MEDICARE A & B/MMO SUPPLEMENT  
  
Angela George  
  
Electronically signed by Angela Vazquez at 2022 11:42 AM EDT  
  
documented in this [facility]11- Miscellaneous Notes* 
  Telephone Encounter - Nathalia Peterson RN - 2022 8:49 AM EDTFormatting 
  of this note might be different from the original.  
Pts daughter called and is notified of providers message and instructions. She 
voices understanding.  
  
Nathalia Peterson RN  
  
Electronically signed by Nathalia Peterson RN at 2022 8:50 AM EDT  
  
* Telephone Encounter - Júnior Ahuja MD - 2022 6:16 PM EDT
  Formatting of this note might be different from the original.  
Noted carvedilol discontinued. Continue to monitor BP and call nephrology or 
here for concerns.  
Electronically signed by Júnior Ahuja MD at 2022 6:18 PM EDT  
  
* Telephone Encounter - Lenore Silva Pss - 2022 1:05 PM EDTFormatting 
  of this note might be different from the original.  
Daughter Dixie is calling in regards to patient seeing kidney doctor Dr Alayna Pugh at [facility] yesterday. She states BP was 102/70 but 
hemoglobin was at 9. Provider reduced Carvedilol medication to 6.25 mg. Also 
told him to take Blood Pressure reading in the morning before any medication. If
 top number is under 130 to call her office. This morning BP was 106/60. Dixie 
thencalled Dr Pugh office and patient was then advised to stop the 
Carvedilol. Advised Dixie to call Dr Pugh office to have lab results fax to
 our office.  
Electronically signed by Lenore Silva Pss at 2022 1:14 PM EDT  
  
documented in this [facility]10- Instructions* Patient 
  Instructions*   
  
Júnior Ahuja MD - 10/17/2022 2:53 PM EDT  
  
Formatting of this note might be different from the original.  
DISCONTINUE LISINOPRIL.  
Electronically signed by Júnior Ahuja MD at 10/17/2022 2:53 PM EDT  
  
  
  
documented in this encounterLicking [facility]10- History of Present 
illness Narrative* Júnior Ahuja MD - 10/17/2022 2:34 PM EDTFormatting of 
  this note is different from the original.  
This note was created using [employer].  
Subjective  
Mitesh Braun is a 82 year old male here with daughter. He fell again and was
 in the ER  forhead injury. He was scheduled with neurology. His 
hypertension was still well controlled on one half dose of lisinopril. He was 
scheduled to see his nephrologist in 2 weeks.  
  
Daughter was concerned about his ability to drive safely. We discussed options 
and he felt stronglythat he could still drive safely with left leg weakness. He 
agreed to a driving evaluation.  
  
Review of Systems  
Constitutional: Negative.  
Respiratory: Negative.  
Cardiovascular: Negative.  
Gastrointestinal: Negative.  
Musculoskeletal: Positive for gait problem.  
Neurological: Positive for weakness. Negative for dizziness, light-headedness 
and headaches.  
  
ACTIVE PROBLEM LIST  
Essential hypertension  
Caren On Cpap  
Esophageal Reflux  
Gout  
Mixed hyperlipidemia  
Impaired Fasting Glucose  
Aortic Valve Disorder  
Polycystic Kidney  
Adenomatous Colon Polyp  
Thrombocytopenia (HCC)  
Cad (Coronary Artery Disease), Native Coronary Artery  
Adjustment Disorder With Depressed Mood  
Asthma Exacerbation  
Peripheral Polyneuropathy  
Splenomegaly  
Ckd (Chronic Kidney Disease) Stage 3, Gfr 30-59 Ml/Min ([facility])  
Abnormality of Gait  
Anemia of Chronic Disease  
Urge Incontinence of Urine  
Obesity, Class II, Bmi 35-39.9  
Frequent Falls  
  
Current Outpatient Medications  
Medication Sig  
ascorbic acid-elderberry fruit 100-50 mg chew Take 2 tablets by mouth once 
daily.  
cyanocobalamin (VITAMIN B-12) 2,500 mcg tablet Take 2,500 mcg by mouth once 
daily.  
melatonin 10 mg tab Take 1 tablet by mouth as needed.  
aspirin, enteric coated (ASPIRIN, ENTERIC COATED) 81 mg EC tablet Take 81 mg by 
mouth once daily.  
albuterol HFA (PROVENTIL HFA, VENTOLIN HFA) 90 mcg/actuation inhaler Inhale 2 
Puffs as instructed every 4 hours as needed for wheezing/shortness of breath.  
lisinopril (ZESTRIL) 40 mg tablet Take 0.5 tablets by mouth once daily. ONE HALF
 TABLET DAILY.  
omeprazole (PRILOSEC) 40 mg capsule Take 1 capsule by mouth once daily.  
carvedilol (COREG) 12.5 mg tablet Take 1 tablet by mouth twice daily with meals.  
allopurinol (ZYLOPRIM) 300 mg tablet Take 1 tablet by mouth once daily.  
magnesium oxide 400 mg magnesium cap Take 1 capsule by mouth once daily.  
fenofibrate nanocrystallized (TRICOR) 48 mg tablet Take 1 tablet by mouth once 
daily.  
sertraline (ZOLOFT) 50 mg tablet Take 1 tablet by mouth once daily.  
nitroglycerin sublingual (NITROQUICK) 0.4 mg SL tablet Dissolve 1 tablet under 
the tongue every 5 minutes as needed for chest pain. FOR CHEST PAIN. IF NO 
RELIEF CALL 911  
fluticasone (FLONASE) 50 mcg/actuation nasal spray Use 1 Spray in each nostril 
once daily.  
ferrous sulfate 325 mg (65 mg iron) EC tablet Take 1 tablet by mouth twice daily
 with meals.  
oxybutynin ER (DITROPAN XL) 15 mg 24 hr Extended Rel Tab Take 2 tablets by mouth
 once daily.  
amoxicillin (POLYMOX, AMOXIL) 500 mg capsule Take four tablets, one hour prior 
to dental work (Patient taking differently: Take four tablets by mouth , one 
hour prior to dental work)  
betamethasone valerate 0.1 % lotion Apply to affected area twice daily.  
fluticasone-vilanterol (BREO ELLIPTA) 200-25 mcg/dose inhaler Inhale 1 
Inhalation as instructed once daily. Inhale one puff once daily. DO NOT CLICK 
OPEN UNTIL READY FOR DOSE  
multivitamin tablet Take 1 tablet by mouth once daily.  
  
No current facility-administered medications for this visit.  
  
  
Objective  
/56 (BP Site: Right Arm, BP Position: Sitting, BP Cuff Size: Large Adult) 
  Pulse 64   Temp 36.3 C (97.4 F) (Temporal)   Resp 16   Wt 112.9 kg (249 lb)   
BMI 38.42 kg/m  
Physical Exam  
Constitutional:  
General: He is not in acute distress.  
Cardiovascular:  
Rate and Rhythm: Normal rate and regular rhythm.  
Pulmonary:  
Breath sounds: Normal breath sounds.  
Musculoskeletal:  
Right lower leg: Edema present.  
Left lower leg: Edema present.  
Neurological:  
General: No focal deficit present.  
Mental Status: He is alert.  
Gait: Gait abnormal.  
Comments: Rollator dependent.  
  
Component Latest Ref Rng & Units 2022  
Protein, Total 6.3 - 8.0 g/dL 5.8 (L)  
Albumin 3.9 - 4.9 g/dL 3.5 (L)  
Calcium 8.5 - 10.2 mg/dL 10.8 (H)  
Bilirubin, Total 0.2 - 1.3 mg/dL 0.4  
Alkaline Phosphatase 38 - 113 U/L 85  
AST 14 - 40 U/L 30  
ALT 10 - 54 U/L 21  
Glucose 74 - 99 mg/dL 108 (H)  
BUN 9 - 24 mg/dL 33 (H)  
Creatinine 0.73 - 1.22 mg/dL 1.31 (H)  
Sodium 136 - 144 mmol/L 140  
Potassium 3.7 - 5.1 mmol/L 5.5 (H)  
Chloride 97 - 105 mmol/L 108 (H)  
CO2 22 - 30 mmol/L 25  
Anion Gap 9 - 18 mmol/L 7 (L)  
eGFR >=60 mL/min/1.73m 54 (L)  
WBC 3.70 - 11.00 k/uL 5.22  
RBC 4.20 - 6.00 m/uL 3.27 (L)  
Hemoglobin 13.0 - 17.0 g/dL 10.9 (L)  
Hematocrit 39.0 - 51.0 % 34.6 (L)  
MCV 80.0 - 100.0 fL 105.8 (H)  
MCH 26.0 - 34.0 pg 33.3  
MCHC 30.5 - 36.0 g/dL 31.5  
RDW-CV 11.5 - 15.0 % 15.0  
Platelet Count 150 - 400 k/uL 69 (L)  
MPV 9.0 - 12.7 fL 12.2  
Absolute nRBC <0.01 k/uL <0.01  
  
Assessment and Plan  
  
1. Peripheral polyneuropathy - ICD9: 356.9, ICD10: G62.9 (primary diagnosis)  
- To see neurology.  
- CONSULT TO OCCUPATIONAL THERE for driving safety evaluation.  
  
2. Abnormality of gait - ICD9: 781.2, ICD10: R26.9  
- CONSULT TO OCCUPATIONAL THER  
  
3. Stage 3a chronic kidney disease (HCC) - ICD9: 585.3, ICD10: N18.31  
- eGFR: Stable  
- Discontinue LISINOPRIL.  
- BASIC METABOLIC PNL in 2 weeks.  
- Nephrology in 2 weeks.  
  
4. Essential hypertension - ICD9: 401.9, ICD10: I10  
- good control  
- See #3.  
  
Júnior Ahuja MD  
  
  
Electronically signed by Júnior Ahuja MD at 10/17/2022 3:04 PM EDT  
  
[facility]- [facility]*  
  PT DISCHARGE - Brenda Hernandez PT - 10/12/2022 10:41 AM EDT
  Formatting of this note might be different from the original.  
SITUATION: private duty caregiver present during today's visit. patient reports 
the following sincethe last homecare visit: medications/allergies--no changes, 
no fall. patient reports he is doing good . States he would love to continue 
home PT if he could  
  
BACKGROUND: Diagnoses (reason for Home Care): falls  
Weight Bearing/Precaution Changes: no changes  
  
ASSESSMENT:  
Focus of visit: reassessment/discharge  
Explained to pt that he has plateaued and it would be a maintenance situation. 
[facility] assist him with his HEP during their 
visit  
  
Physical therapy discharged: goals achieved.  
  
Functional performance at discharge - bed mobility independent, transfers 
independent, ambulation independent and stairs supervision.  
  
Plan of care, goals, and discharge reviewed and agreed upon with patient and/or 
caregiver.  
  
RECOMMENDATION:  
Patient discharged from home health services.  
  
Instructions to include:home exercise program as directed  
  
See intervention summary for intervention/education details.  
Electronically signed by Brenda Hernandez PT at 10/12/2022 4:20 PM EDT  
  
[facility]10- [facility]*  
  PT ROUTINE/REASSESSMENT/RECERT/CASE MGMT - Lalitha Pily, PTA - 10/10/2022 8:53
   AM EDTFormatting of this note might be different from the original.  
SITUATION:  
private duty caregiver present during today's visit. patient reports the 
following since the last homecare visit: medications/allergies--no changes, no 
fall. patient reports he is doing good .  
  
BACKGROUND:  
Diagnoses (reason for Home Care): falls  
  
Weight Bearing/Precaution Changes: no changes  
  
ASSESSMENT:  
Focus of visit seted and standing strength and balance exercises for HEP. 
patient remains challenged w/ seated left hip flexion but otherwise does well w/
 exercises. to continue w/ ww or rollator forall ambulation for safety  
  
Plan of care, goals, and visit frequency reviewed and agreed upon with patient 
and/or caregiver.  
  
Current Discharge Plan: family support  
Anticipate discharge by 10/12/22  
  
RECOMMENDATION:  
Next visit to focus on PT to see for possible DC  
  
See intervention summary for intervention/education details.  
Electronically signed by Lalitha Nascimento PTA at 10/10/2022 12:00 PM EDT  
  
documented in this encounterL[facility]10- [facility] Notes* HH 
  PT ROUTINE/REASSESSMENT/RECERT/CASE MGMT - Lalitha Nascimento PTA - 10/07/2022 2:31
   PM EDTFormatting of this note might be different from the original.  
SITUATION:  
only patient present during today's visit. patient reports the following since 
the last homecare visit: medications/allergies--no changes, no fall. patient 
reports he is doing ok today.  
  
BACKGROUND:  
Diagnoses (reason for Home Care): physicians referral for falls and neuropathy  
  
fall last week with L hip contusion  
  
Weight Bearing/Precaution Changes: no changes  
  
ASSESSMENT:  
Focus of visit resumed standing exercises for strengthening and balance. gait 
training w/ww, transfers  
  
Plan of care, goals, and visit frequency reviewed and agreed upon with patient 
and/or caregiver.  
  
Current Discharge Plan: independent with home exercise program  
Anticipate discharge by 10/22/22  
  
RECOMMENDATION:  
Next visit to focus on increase reps ifable  
  
See intervention summary for intervention/education details.  
Electronically signed by Lalitha Nascimento PTA at 10/07/2022 3:22 PM EDT  
  
documented in this encounterL[facility]10- Miscellaneous Notes* 
  Telephone Encounter - Nathalia Peterson RN - 10/05/2022 3:02 PM EDTFormatting 
  of this note might be different from the original.  
Called and left a voicemail for the Patient's daughter to call back and ask for 
a nurse to receive the providers message. Faxed orders, last OV note, labs, and 
x-rays to [facility] at fax # [phone].  
  
Nathalia Peterson RN  
  
Electronically signed by Nathalia Peterson RN at 10/05/2022 3:05 PM EDT  
  
* Telephone Encounter - Júnior Ahuja MD - 10/05/2022 1:13 PM EDT
  Formatting of this note might be different from the original.  
ASSESSMENT/PLAN:  
1. Frequent falls - ICD9: V15.88, ICD10: R29.6 (primary diagnosis)  
- CONSULT TO NEUROLOGY  
  
2. Peripheral polyneuropathy - ICD9: 356.9, ICD10: G62.9  
- CONSULT TO NEUROLOGY  
  
Júnior Ahuja MD  
Electronically signed by Júnior Ahuja MD at 10/05/2022 1:13 PM EDT  
  
* Telephone Encounter - Melva Khalil RN - 10/03/2022 11:19 AM EDT
  Formatting of this note might be different from the original.  
Daughter (Dixie) calls to report that patient had another fall over the weekend.
 She is asking if provider would consider a consult to neurology for neuropathy 
and falls.  
  
Pended for review. Needs diagnosis. Dixie reports she would want it to be sent 
to [facility].  
  
Fax number is [phone].  
Phone number is [phone].  
  
Please review and advise,  
  
Melva Khalil RN  
  
  
Electronically signed by Melva Khalil RN at 10/03/2022 11:30 AM EDT  
  
documented in this encounterLicking [facility]10- Miscellaneous Notes* 
  Telephone Encounter - Lalitha Nascimento PTA - 10/05/2022 2:35 PM EDTFormatting of 
  this note might be different from the original.  
Patient reported he fell on  when ambulating outside of home. Denies 
injury and was able to complete PT today without concerns. Thanks  
Electronically signed by Lalitha Nascimento PTA at 10/05/2022 2:37 PM EDT  
  
documented in this encounterL[facility]10- Miscellaneous Notes* 
  Telephone Encounter - Son Norton Ma - 10/03/2022 1:03 PM EDTFormatting of this
   note might be different from the original.  
Okay given to Neiad.  
Son Norton Ma  
  
Electronically signed by Son Norton Ma at 10/03/2022 1:03 PM EDT  
  
* Telephone Encounter - Krista Perez APRN.CNP - 10/03/2022 12:18 PM EDTFormatting 
  of this note might be different from the original.  
Shorty Perez APRN.CNP  
Electronically signed by Krista Perez APRN.CNP at 10/03/2022 12:19 PM EDT  
  
* Telephone Encounter - Starr Marquez LPN - 2022 1:04 PM EDTFormatting 
  of this note might be different from the original.  
Neida from [facility] PT calling she did her re-assessment and would like to 
continue visits 2 times weekly for 2 more weeks. She is asking for verbal order 
please. If have problem reaching her onwork phone can call her person phone 
number. Please advise  
Electronically signed by Starr Marquez LPN at 2022 1:08 PM EDT  
  
documented in this encounterLicking [facility]09- Miscellaneous Notes*  
  PT ROUTINE/REASSESSMENT/RECERT/CASE MGMT - Neida Gutierrez, PT - 2022 11:24
   AM EDTFormatting of this note might be different from the original.  
SITUATION:  
private duty caregiver present during today's visit. patient reports the 
following since the last homecare visit: medications/allergies--no changes, no 
fall. patient reports he thinks he needs more PT, .  
  
BACKGROUND:  
  
Diagnoses (reason for Home Care): physicians referral for falls and neuropathy 
fall last week with L hip contusion  
  
Weight Bearing/Precaution Changes: no changes  
  
ASSESSMENT:  
Focus of visit: reassessment of functional ability. Pt has improved however has 
not achieved goals;only tolerated 3 .5 minutes of standing activity. RPE 4/10. 
He will benefit from continued PT intervention  
  
Plan of care, goals, and visit frequency reviewed and agreed upon with patient 
and/or caregiver.  
  
Current Discharge Plan: independent with home exercise program  
Anticipate discharge by 10/14/22  
  
RECOMMENDATION:  
continue PT 2w2  
Next visit to focus on endurance, ther ex, mobility training  
  
See intervention summary for intervention/education details.  
Electronically signed by Neida Gutierrez, PT at 10/02/2022 7:35 AM EDT  
  
documented in this encounterLicking [facility] Hospital09- Miscellaneous Notes*  
  PT ROUTINE/REASSESSMENT/RECERT/CASE MGMT - Lalitha Pily, PTA - 2022 8:56
   AM EDTFormatting of this note might be different from the original.  
SITUATION:  
only patient present during today's visit. patient reports the following since 
the last homecare visit: medications/allergies--no changes, no fall. patient 
reports he is doing ok.  still not as strong as i want to be. .  
  
BACKGROUND:  
Diagnoses (reason for Home Care): physicians referral for falls and neuropathy 
fall last week with L hip contusion  
  
Weight Bearing/Precaution Changes: no changes  
  
ASSESSMENT:  
Focus of visit LE strength and balance exercises for HEP, gait training w/ww to 
increase safety w/ functional mobility. Challenged w/ tband and standing 
exercises. denies increased pain but did report of muscle fatigue after, L>R LE 
Issued NOMNC  
  
Plan of care, goals, and visit frequency reviewed and agreed upon with patient 
and/or caregiver.  
  
Current Discharge Plan: independent with home exercise program  
Anticipate discharge by TBD  
  
RECOMMENDATION:  
Next visit to focus on PT to see for possible DC or reassessment  
  
See intervention summary for intervention/education details.  
Electronically signed by Lalitha Nascimento PTA at 2022 9:50 AM EDT  
  
documented in this encounterLicking [facility]09- Miscellaneous Notes*  
  PT ROUTINE/REASSESSMENT/RECERT/CASE MGMT - Brenda Hernandez, PT - 
  2022 9:01 AM EDTFormatting of this note might be different from the 
  original.  
SITUATION: only patient present during today's visit. patient reports the 
following since the last homecare visit: medications/allergies--no changes, no 
fall. patient reports he has felt really good for past 2 days..  
BACKGROUND: Diagnoses (reason for Home Care): physicians referral for falls and 
neuropathy fall last week with L hip contusion  
Weight Bearing/Precaution Changes: no changes  
ASSESSMENENT: advancing Le exercises Focusing on L HIp and bilateral hamstrings  
Plan of care, goals, and visit frequency reviewed and agreed upon with patient 
and/or caregiver.  
Current Discharge Plan: independent with home exercise program  
Anticipate discharge by 10/1/22  
RECOMMENDATION: Next visit to focus on resume standing exercises for strength 
and balance See intervention summary for intervention/education details.  
Electronically signed by Brenda Hernandez PT at 2022 4:22 PM EDT  
  
documented in this encounterLicking [facility]09- Miscellaneous Notes*  
  PT ROUTINE/REASSESSMENT/RECERT/CASE MGMT - Lalitha Nascimento PTA - 2022 2:11
   PM EDTFormatting of this note might be different from the original.  
SITUATION:  
only patient present during today's visit. patient reports the following since 
the last homecare visit: medications/allergies--no changes, no fall. patient 
reports he has felt really good for past 2 days..  
  
BACKGROUND:  
Diagnoses (reason for Home Care): physicians referral for falls and neuropathy 
fall last week with L hip contusion  
  
Weight Bearing/Precaution Changes: no changes  
  
ASSESSMENT:Focus of visit outdoor ambulation including uneven terrain w/ 
rollator and SBA.! seated rest break needed due to LE muscle fatigue transfers 
on/off chair  
  
Plan of care, goals, and visit frequency reviewed and agreed upon with patient 
and/or caregiver.  
  
Current Discharge Plan: independent with home exercise program  
Anticipate discharge by 10/1/22  
  
RECOMMENDATION:  
Next visit to focus on resume standing exercises for strength and balance  
  
See intervention summary for intervention/education details.  
Electronically signed by Lalitha Nascimento PTA at 2022 3:38 PM EDT  
  
documented in this encounterLicking [facility]09- Miscellaneous Notes*  
  PT ROUTINE/REASSESSMENT/RECERT/CASE MGMT - Brenda Hernandez, PT - 
  2022 2:21 PM EDTFormatting of this note might be different from the 
  original.  
SITUATION: only patient present during today's visit. patient reports the 
following since the last homecare visit: medications/allergies--no changes, no 
fall. patient reports that he is a little tired and his L knee is a little sore 
- reports he was standing a lot making pickles yesterday  
BACKGROUND: Diagnoses (reason for Home Care): physicians referral for falls and 
neuropathy fall last week with L hip contusion  
Weight Bearing/Precaution Changes: no changes  
ASSESSMENT: Focus of visit progressiom of strength exercises for HEP, gait 
training w/ww and balce activities. denies increased pain w/ exercises today but
 did report of muscle fatigue  
Plan of care, goals, and visit frequency reviewed and agreed upon with patient 
and/or caregiver.  
Current Discharge Plan: independent with home exercise program Anticipate 
discharge by TBD  
RECOMMENDATION: Next visit to focus on out door ambulation if able See 
intervention summary for intervention/education details.  
Electronically signed by Brenda Hernandez, PT at 2022 4:50 PM EDT  
  
documented in this encounterL[facility]09- Miscellaneous Notes*  
  PT ROUTINE/REASSESSMENT/RECERT/CASE MGMT - Lalitha Nascimento, PTA - 2022 2:00
   PM EDTFormatting of this note might be different from the original.  
SITUATION:  
only patient present during today's visit. patient reports the following since 
the last homecare visit: medications/allergies--no changes, no fall. patient 
reports he didnt sleep well last night and woke up with a stiff back.  
  
BACKGROUND:  
Diagnoses (reason for Home Care): physicians referral for falls and neuropathy 
fall last week with L hip contusion  
  
Weight Bearing/Precaution Changes: no changes  
  
ASSESSMENT:  
Focus of visit progressiom of strength exercises for HEP, gait training w/ww and
 balce activities. denies increased pain w/ exercises today but did report of 
muscle fatigue  
  
Plan of care, goals, and visit frequency reviewed and agreed upon with patient 
and/or caregiver.  
  
Current Discharge Plan: independent with home exercise program  
Anticipate discharge by TBD  
  
RECOMMENDATION:  
Next visit to focus on out door ambulation if able  
  
See intervention summary for intervention/education details.  
Electronically signed by Lalitha Nascimento PTA at 2022 3:20 PM EDT  
  
documented in this encounterL[facility]09- Miscellaneous Notes*  
  PT ROUTINE/REASSESSMENT/RECERT/CASE MGMT - Lalitha Nascimento PTA - 2022 
  10:29 AM EDTFormatting of this note might be different from the original.  
SITUATION:  
private duty caregiver present during today's visit. patient reports the 
following since the last homecare visit: medications/allergies--no changes, no 
fall. patient reports he was only a little soreafter last PT visit . .  
  
BACKGROUND:  
Diagnoses (reason for Home Care): fall w/left hip contusion  
  
Weight Bearing/Precaution Changes: no changes  
  
ASSESSMENT:  
Focus of visit strengthening exexises for HEP and gait training w/ rollator  
  
Plan of care, goals, and visit frequency reviewed and agreed upon with patient 
and/or caregiver.  
  
Current Discharge Plan: family support  
Anticipate discharge by 10/1/22  
  
RECOMMENDATION:  
Next visit to focus on try standing hamstring curls and hip abduction  
  
See intervention summary for intervention/education details.  
Electronically signed by Lalitha Nascimento PTA at 2022 1:13 PM EDT  
  
documented in this encounterL[facility]09- Miscellaneous Notes*  
  PT ROUTINE/REASSESSMENT/RECERT/CASE MGMT - Brenda Hernandez, PT - 
  2022 10:21 AM EDTFormatting of this note might be different from the 
  original.  
SITUATION: paid cg present during today's visit. patient reports  Im not too bad
 today  .  
BACKGROUND: Diagnoses (reason for Home Care): physicians referral for falls and 
neuropathy fall last week with L hip contusion  
Past Medical History: Peripheral polyneuropathy Contusion of left hip region, 
subsequent encounter Frequent falls Musc ular weakness Abnormality of gait 
Essential hypertension CAREN On Cpap Esophageal Reflux Gout Mixed hyperlipidemia 
Aortic Valve Disorder Polycystic Kidney Adenomatous Colon Polyp Thrombocytopenia
 CAD (Coronary Artery Disease), Native Coronary Artery Adjustment Disorder With 
Depr essed Mood Asthma Exacerbation Peripheral Polyneuropathy Splenomegaly CKD 
(Chronic Kidney Disease) Stage 3, GFR 30-59 mL/min Anemia of Chronic Disease 
Urge Incontinence of Urine Obesity, Class II, BMI 35-39.9  
Weight Bearing or Surgical Precautions: none  
  
ASSESSMENT:  
Focus of visit DAVID LE ther ex  
  
Plan of care, goals, and visit frequency reviewed and agreed upon with patient 
and/or caregiver.  
  
Current Discharge Plan: independent with home exercise program  
Anticipate discharge by 10/1/22  
  
RECOMMENDATION:  
Next visit to focus on assess for ex tolerance and DOMS , progress as able  
  
See intervention summary for intervention/education details.  
Electronically signed by Brenda Hernandez PT at 2022 5:23 PM EDT  
  
documented in this encounterLicking [facility]09- Miscellaneous Notes*  
  PT SOC/KAYLEE/FOLLOW UP/OTHER - Brenda Hernandez PT - 2022 3:06 PM
   EDTFormatting of this note might be different from the original.  
SITUATION:  
only patient present during today's visit. patient reports  Im hoping you can 
help me stop falling .  
  
BACKGROUND:  
Diagnoses (reason for Home Care): physicians referral for falls and neuropathy  
fall last week with L hip contusion  
  
Past Medical History: Peripheral polyneuropathy  
Contusion of left hip region, subsequent encounter  
Frequent falls  
Musc ular weakness  
Abnormality of gait  
Essential hypertension  
CAREN On Cpap  
Esophageal Reflux  
Gout  
Mixed hyperlipidemia  
Aortic Valve Disorder  
Polycystic Kidney  
Adenomatous Colon Polyp  
Thrombocytopenia  
CAD (Coronary Artery Disease), Native Coronary Artery  
Adjustment Disorder With Depr essed Mood  
Asthma Exacerbation  
Peripheral Polyneuropathy  
Splenomegaly CKD (Chronic Kidney Disease) Stage 3, GFR 30-59 mL/min  
Anemia of Chronic Disease  
Urge Incontinence of Urine  
Obesity, Class II, BMI 35-39.9  
  
Weight Bearing or Surgical Precautions: none  
  
ASSESSMENT:  
[facility]. Reviewed and 
explained homecare services. Plan of care, goals, and visit frequency developed,
 reviewed, and agreed upon with patient and/or caregiver.  
  
Patient Goal: stop falling  
  
Patient will benefit from continued physical therapy to address the following 
deficits: strength, balance, gait and transfers.  
  
Current Discharge Plan: independent with home exercise program. Anticipate 
discharge by 10/1/22.  
  
RECOMMENDATION:  
Next visit to focus on developing HEP  
  
Agreeable to PT;  
  
See intervention summary for intervention/education details.  
Electronically signed by Brenda Hernandez PT at 2022 7:39 PM EDT  
  
documented in this encounterLicking [facility] Hospital08- Miscellaneous Notes* 
  Telephone Encounter - Lolis Cervantes RN - 2022 1:02 PM EDTFormatting 
  of this note might be different from the original.  
Spoke with patient's daughter, Dixie. Given message from provider's office. She 
verbalizes understanding.  
Lolis Cervantes RN  
Electronically signed by Lolis Cervantes RN at 2022 1:03 PM EDT  
  
* Telephone Encounter - Helen E Zollinger LPN - 2022 12:56 PM EDT
  Formatting of this note might be different from the original.  
Left message for Dixie/daughter to call & ask to speak to a nurse.  
Helen Zollinger LPN  
  
Electronically signed by Helen E Zollinger LPN at 2022 12:57 PM EDT  
  
* Telephone Encounter - Júnior Ahuja MD - 2022 12:15 PM EDT
  Formatting of this note might be different from the original.  
Referral for home therapy was reordered as clarified with home health.  
  
Patient should consider giving up driving.  
Electronically signed by Júnior Ahuja MD at 2022 12:17 PM EDT  
  
* Telephone Encounter - Melva Khalil RN - 2022 8:45 AM EDT
  Formatting of this note might be different from the original.  
Son-In-Law (Christian) calls in on behalf of daughter Dixie to let provider know 
that patient has had 3more falls over the weekend while at his camper. Patient 
had to be assisted up all 3 times. No injuries but he is no longer able to 
assist self up and it is taking patient a lot longer to go up and down the 
stairs to the camper.  
  
Christian asking about patient continuing to drive and orders for HH therapy. 
Notified Christian that patient would be notified from [facility] as referral is still 
active.  
  
Melva Khalil RN  
  
Electronically signed by Melva Khalil RN at 2022 8:55 AM EDT  
  
* Telephone Encounter - Lolis Cervantes RN - 2022 4:31 PM EDTFormatting 
  of this note might be different from the original.  
Patient's daughter calling to let PCP know that patient fell today and landed on
 his back and hit the back of his head/neck on a door. He is @ [facility] ER and has 
had a CT scan. His main complaint is neckpain. She says they have been unable to
 start PT due to more information needed. Please see Home Care note in Epic from
 Estephanie Rodriguez LPN sent yesterday.  
Daughter is requesting call back when PT can be initiated.  
Lolis Cervantes RN  
Electronically signed by Lolis Cervantes RN at 2022 4:37 PM EDT  
  
[facility] encounterL[facility]- M[facility]ellaneous Notes* 
  Telephone Encounter - Neida Misael - 2022 9:47 AM EDTFormatting of this 
  note might be different from the original.  
Welcome Home Call:  
  
a. Date and Time: 9:48 AM 2022  
  
b. Contact name/relationship: Mitesh gomez Have you been active with any Home Care company in the last 60 days(such as 
help with bathing, filling medications, checking your blood pressure) ? No.  
  
d. [facility] will be providing your care, are you agreeable to 
starting these services? yes (yes or no)  
  
e. Do you have any upcoming appointments in the next few days, or restrictions 
to your schedule? yes  
We would come to see you in 24-48* from your discharge today; Are you agreeable 
to a visit in that time frame? yes (Yes/ No (if no, when would you like to be 
seen?))  
  
f. Caregiver: Patient is able to manage care independently  
  
Please keep our your medications both over the counter and prescribed out for 
the home care to review, your hospital discharge instructions and write down any
 questions you might have.  
  
In order to maintain a safe environment for our caregivers, [facility] 
[facility] requires anyanimals or weapons present in the home be located in a 
secured location.  
  
Our clinicians will call you the night before or the morning of the appointment.
 Their # may come up restricted but they'll leave a VM for you.  
  
In case you have any questions or concerns in the meantime, our # is 
[phone], option 1  
  
Thank you for your time and have a great day.  
  
Neida Douglas  
  
Electronically signed by Neida Douglas at 2022 9:51 AM EDT  
  
documented in this encounterL[facility]08- Miscellaneous Notes* 
  Telephone Encounter - Estephanie Rodriguez LPN - 2022 4:19 PM EDTFormatting
   of this note might be different from the original.  
Thank you for the Home Care Service referral.  
  
In order to proceed, we require an addendum for the following information on the
 encounter on 2022, per Medicare guidelines.  
  
The patient is homebound because of _______________________.  
The patient requires homecare because of ____________________.  
  
AND  
  
We are unable to use Contusion of hip region, Frequent falls, Muscular weakness,
 and Abnormality ofgait as a primary diagnosis. Please update orders to include 
a primary diagnosis causing symptoms: Contusion of hip region, Frequent falls, 
Muscular weakness, and Abnormality of gait.  
  
Kindly, complete an addendum to your 2022 visit to support homecare needs 
for this patient.  
  
A new order for homecare will need to be submitted with the matching Face to 
Face encounter date noted.  
  
If you have any questions, [facility] Intake can be reached at [phone].  
  
Thank you in advance.  
  
Estephanie Rodriguez LPN  
Central Admissions Intake Nurse  
[phone]  
Electronically signed by Estephanie Rodriguez LPN at 2022 4:23 PM EDT  
  
documented in this encounterLicking [facility]08- Miscellaneous Notes* 
  Telephone Encounter - Helen E Zollinger LPN - 2022 1:21 PM EDT  
  
Formatting of this note might be different from the original.  
Home number does not have vm set up, left message on daughter's phone for 
Patient to call & speak to nurse for non-urgent results.  
Helen Zollinger LPN  
  
  
  
  
Electronically signed by Helen E Zollinger LPN at 2022 1:22 PM EDT  
  
  
* Telephone Encounter - Helen E Zollinger LPN - 2022 1:17 PM EDT  
  
Formatting of this note might be different from the original.  
----- Message from Júnior Ahuja MD sent at 8/3/2022 11:30 PM EDT -----  
Stable anemia, low platelets. Glucose and kidney function okay and stable.  
  
  
  
Electronically signed by Helen E Zollinger LPN at 2022 1:17 PM EDT  
  
  
documented in this encounterLicking [facility]07- Instructions* Patient 
  Instructions*   
  
Júnior Ahuja MD - 2022 10:13 AM EDT  
  
  
  
Formatting of this note might be different from the original.  
BLOOD TEST TODAY, NON FASTING.  
  
  
  
Electronically signed by Júnior Ahuja MD at 2022 10:14 AM EDT  
  
  
  
  
documented in this encounterLicking [facility] Hospital07- History of Present 
illness Narrative* Júnior Ahuja MD - 2022 10:04 AM EDT  
  
Formatting of this note is different from the original.  
This note was created using Appcelerator.  
Subjective  
Mitesh Braun is a 82 year old male. He had no new concerns. He did not do 
his labs.  
  
He was supposed to have a colonoscopy last year, but this kept getting cancelled
 and rescheduled for one reason or another. He had a history of tubular adenoma.
 He preferred to stay local for the colonoscopy.  
  
Dr. Mccollum switched him to a BiPAP recently, due to air leaks.  
  
Review of Systems  
Constitutional: Negative.  
Respiratory: Negative for chest tightness and shortness of breath.  
Cardiovascular: Positive for leg swelling. Negative for chest pain and 
palpitations.  
Gastrointestinal: Negative for abdominal pain, blood in stool, constipation and 
diarrhea.  
Genitourinary: Negative.  
Musculoskeletal: Positive for gait problem.  
Hematological: Bruises/bleeds easily.  
  
ACTIVE PROBLEM LIST  
Essential hypertension  
Caren On Cpap  
Esophageal Reflux  
Gout  
Mixed hyperlipidemia  
Impaired Fasting Glucose  
Aortic Valve Disorder  
Polycystic Kidney  
Adenomatous Colon Polyp  
Thrombocytopenia (HCC)  
Cad (Coronary Artery Disease), Native Coronary Artery  
Adjustment Disorder With Depressed Mood  
Asthma Exacerbation  
Peripheral Polyneuropathy  
Splenomegaly  
Ckd (Chronic Kidney Disease) Stage 3, Gfr 30-59 Ml/Min (Hcc)  
Abnormality of Gait  
Anemia of Chronic Disease  
Urge Incontinence of Urine  
Obesity, Class II, Bmi 35-39.9  
  
PAST SURGICAL HISTORY  
Procedure Laterality Date  
ARTHRP KNE CONDYLE&PLATU MEDIAL&LAT COMPARTMENTS Left 2017  
BAL. ASSIST ENTEROSCOPY W/ BX 2011  
CAPSULE ENDO SMALL BOWEL W EGD 2011  
COLONOSCOPY FLX DX W/COLLJ SPEC WHEN PFRMD   
Colonoscopy  
COLONOSCOPY FLX DX W/COLLJ SPEC WHEN PFRMD   
Colonoscopy  
COLONOSCOPY FLX DX W/COLLJ SPEC WHEN PFRMD 2011  
Colonoscopy  
COLONOSCOPY FLX DX W/COLLJ SPEC WHEN PFRMD 2018  
Colonoscopy  
COLONOSCOPY W/BIOPSY SINGLE/MULTIPLE 2008  
ESOPHAGOGASTRODUODENOSCOPY TRANSORAL DIAGNOSTIC 2018  
EGD  
ESOPHAGOGASTRODUODENOSCOPY TRANSORAL DIAGNOSTIC 2019  
EGD  
ESOPHAGOGASTRODUODENOSCOPY TRANSORAL DIAGNOSTIC 2020  
EGD  
EXCISION PILONIDAL CYST/SINUS SIMPLE 1964  
HEMORRHOIDECTOMY INTERNAL RUBBER BAND LIGATIONS 2018 and 18  
OPTX FEM SHFT FX W/INSJ IMED IMPLT W/WO SCREW Left 2020  
L hip cephalo medullary nail.  
RPLCMT AORTIC VALVE OPN W/STENTLESS TISSUE VALVE 10/04/2011  
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  
RPR UMBILICAL HRNA 5 YRS/> REDUCIBLE 09/10/2010  
Hernia repair, umbilical >5yr  
  
  
Current Outpatient Medications  
Medication Sig  
omeprazole (PRILOSEC) 40 mg capsule Take 1 capsule by mouth once daily.  
carvedilol (COREG) 12.5 mg tablet Take 1 tablet by mouth twice daily with meals.  
allopurinol (ZYLOPRIM) 300 mg tablet Take 1 tablet by mouth once daily.  
magnesium oxide 400 mg magnesium cap Take 1 capsule by mouth once daily.  
fenofibrate nanocrystallized (TRICOR) 48 mg tablet Take 1 tablet by mouth once 
daily.  
sertraline (ZOLOFT) 50 mg tablet Take 1 tablet by mouth once daily.  
lisinopril (ZESTRIL) 40 mg tablet Take 1 tablet by mouth once daily.  
nitroglycerin sublingual (NITROQUICK) 0.4 mg SL tablet Dissolve 1 tablet under 
the tongue every 5 minutes as needed for chest pain. FOR CHEST PAIN. IF NO 
RELIEF CALL 911  
fluticasone (FLONASE) 50 mcg/actuation nasal spray Use 1 Spray in each nostril 
once daily.  
ferrous sulfate 325 mg (65 mg iron) EC tablet Take 1 tablet by mouth twice daily
 with meals.  
oxybutynin ER (DITROPAN XL) 15 mg 24 hr Extended Rel Tab Take 2 tablets by mouth
 once daily.  
amoxicillin (POLYMOX, AMOXIL) 500 mg capsule Take four tablets, one hour prior 
to dental work  
betamethasone valerate 0.1 % lotion Apply to affected area twice daily.  
fluticasone-vilanterol (BREO ELLIPTA) 200-25 mcg/dose inhaler Inhale 1 
Inhalation as instructed once daily. Inhale one puff once daily. DO NOT CLICK 
OPEN UNTIL READY FOR DOSE  
multivitamin tablet Take 1 tablet by mouth once daily.  
  
No current facility-administered medications for this visit.  
  
  
Objective  
/66 (BP Site: Left Arm, BP Position: Sitting, BP Cuff Size: Large Adult)  
 Pulse 60   Temp 36.4 C (97.6 F) (Temporal Artery)   Resp 16   Wt 113.4 kg (250 
lb)   BMI 38.58 kg/m  
Physical Exam  
Constitutional:  
General: He is not in acute distress.  
Eyes:  
Conjunctiva/sclera: Conjunctivae normal.  
Cardiovascular:  
Rate and Rhythm: Normal rate and regular rhythm.  
Heart sounds: No murmur heard.  
No gallop.  
Pulmonary:  
Breath sounds: Normal breath sounds.  
Abdominal:  
Palpations: Abdomen is soft.  
Tenderness: There is no abdominal tenderness.  
Musculoskeletal:  
Right lower le+ Pitting Edema present.  
Left lower le+ Pitting Edema present.  
Neurological:  
General: No focal deficit present.  
Mental Status: He is alert and oriented to person, place, and time.  
Comments: Using a rollator.  
  
Assessment and Plan  
  
1. Adenomatous polyp of colon, unspecified part of colon - ICD9: 211.3, ICD10: 
D12.6 (primary diagnosis)  
Refer to Dr. Sutton.  
- CONSULT TO GASTROENTEROLOGY  
  
2. Impaired fasting glucose - ICD9: 790.21, ICD10: R73.01  
Recheck.  
  
3. Stage 3 chronic kidney disease, unspecified whether stage 3a or 3b CKD (HCC) 
- ICD9: 585.3, ICD10: N18.30  
Recheck.  
  
4. Essential hypertension - ICD9: 401.9, ICD10: I10  
- good control  
- Continue current medication(s)  
- Reviewed risks of HTN and principles of treatment  
  
5. Anemia of chronic disease - ICD9: 285.29, ICD10: D63.8  
Recheck.  
  
6. Thrombocytopenia (HCC) - ICD9: 287.5, ICD10: D69.6  
Recheck.  
  
Júnior Ahuja MD  
  
  
  
Electronically signed by Júnior Ahuja MD at 2022 10:24 AM EDT  
  
  
documented in this encounterLicking [facility] Hospital06- Miscellaneous Notes* 
  Telephone Encounter - Annmarie Akhtar MA - 2022 11:24 AM EDT  
  
Formatting of this note might be different from the original.  
Will put an appt. Note for F/U with PCP  to assist with scheduling in 
GASTRO.  
  
Annmarie Akhtar MA  
  
  
  
  
Electronically signed by Annmarie Akhtar MA at 2022 11:25 AM EDT  
  
  
* Telephone Encounter - Maribell Vanegas LPN - 2022 12:44 PM EDT  
  
Formatting of this note might be different from the original.  
Attempted to reach patient to scheduled follow up with Radha. No answer and 
unable to leave a message.  
  
Please have patient keep in mind in order to complete scopes he has to have 
someone bring him to the appointment for the scopes and take him home 
afterwards. He is not able to take public transportation for the scopes.  
  
  
  
  
Electronically signed by Maribell Vanegas LPN at 2022 8:44 AM EDT  
  
  
* Telephone Encounter - Radha Kong APRN.CNP - 06/15/2022 1:33 PM EDT  
  
Formatting of this note might be different from the original.  
He was to have schedule a EGD and Colonoscopy for further evaluation of anemia 
last year.  
  
He would need to reschedule with me to get worked up for scopes. \  
Radha Kong APRN.CNP  
  
  
  
  
Electronically signed by Radha Kong APRN.CNP at 06/15/2022 1:34 PM EDT  
  
  
* Telephone Encounter - Natahlia Peterson RN - 06/15/2022 9:45 AM EDT  
  
Formatting of this note might be different from the original.  
Pt reports he missed a call from us. Let Pt I couldn't find a note anywhere. The
 only thing I couldfind was that he needed to schedule his EGD and Colonoscopy. 
Pt reports provider wanted him to go to somewhere far away and Pt states he 
can't because he is an old man and would need to have someone take him. Pt 
states he is without electricity right now so he will have to call back to 
schedule it.  
  
  
  
Electronically signed by Nathalia Peterson RN at 06/15/2022 9:50 AM EDT  
  
  
documented in this encounterLicking [facility] Hospital06- Miscellaneous Notes* 
  Telephone Encounter - Nathalia Peterson RN - 2022 3:00 PM EDT  
  
Formatting of this note is different from the original.  
[facility]. I asked him 
if he had any swelling, and he reports that he has noticed his legs have been 
swelling since he has stopped. Pt asking if provider still wants him to take it.
 Pt also reports he does not have any Nitro, he ran his last bottle through the 
wash. Please call and advise. If provider still would like Pt on HCTZ, it would 
need to be added to medications that need ordered.  
  
Patient has been identified by name and date of birth: Yes  
Patient phones for refill(s):  
Pending Prescriptions Disp Refills  
OMEPRAZOLE 40 MG CAPSULE,DELAYED RELEASE 90 capsule 3  
Sig: Take 1 capsule by mouth once daily.  
JOHNNY: No  
CARVEDILOL 12.5 MG TABLET 180 tablet 3  
Sig: Take 1 tablet by mouth twice daily with meals.  
JOHNNY: No  
ALLOPURINOL 300 MG TABLET 90 tablet 3  
Sig: Take 1 tablet by mouth once daily.  
JOHNNY: No  
MAGNESIUM 400 MG (AS MAGNESIUM OXIDE) CAPSULE 90 capsule 3  
Sig: Take 1 capsule by mouth once daily.  
JOHNNY: No  
FENOFIBRATE NANOCRYSTALLIZED 48 MG TABLET 90 tablet 3  
Sig: Take 1 tablet by mouth once daily.  
JOHNNY: No  
SERTRALINE 50 MG TABLET 90 tablet 3  
Sig: Take 1 tablet by mouth once daily.  
JOHNNY: No  
LISINOPRIL 40 MG TABLET 90 tablet 3  
Sig: Take 1 tablet by mouth once daily.  
JOHNNY: No  
NITROGLYCERIN 0.4 MG SUBLINGUAL TABLET 25 tablet 3  
Sig: Dissolve 1 tablet under the tongue as needed. FOR CHEST PAIN. IF NO RELIEF 
CALL 911  
JOHNNY: No  
  
  
Date of last office visit in primary care: 22  
Future visit: 22  
Last 2 Encounter Wt Readings:  
Date: Wt:  
2022 115.7 kg (255 lb)  
2021 126.6 kg (279 lb)  
Previous labs/tests for medication:  
Cholesterol:  
HDL Cholesterol (mg/dL)  
Date Value  
2022 25  
  
LDL Cholesterol (mg/dL)  
Date Value  
2022 68  
  
ALT (U/L)  
Date Value  
2021 18  
2021 19  
  
Non HDL Cholesterol (mg/dL)  
Date Value  
2022 98  
  
Blood Pressure:  
BUN (mg/dL)  
Date Value  
2022 28  
  
Sodium (mmol/L)  
Date Value  
2022 139  
Last 1 Encounter BP Readings:  
Date: BP:  
2022 128/76  
Blood Counts:  
WBC (k/uL)  
Date Value  
2022 5.31  
  
RBC (m/uL)  
Date Value  
2022 3.72  
  
Hematocrit (%)  
Date Value  
2022 40.3  
  
Hemoglobin (g/dL)  
Date Value  
2022 12.9  
  
Platelet Count (k/uL)  
Date Value  
2022 69  
  
Liver Function:  
ALT (U/L)  
Date Value  
2021 18  
2021 19  
  
AST (U/L)  
Date Value  
2021 30  
2021 31  
  
Please advise. Thank you. Nathalia Peterson, RN  
  
  
  
  
  
Electronically signed by Nathalia Peterson RN at 2022 3:09 PM EDT  
  
  
documented in this encounterLicking [facility] Hospital03- Miscellaneous Notes* 
  Telephone Encounter - Krista Perez APRN.CNP - 2022 1:25 PM EDT  
  
Formatting of this note might be different from the original.  
Shorty Perez APRN.CNP  
  
  
  
Electronically signed by Krista Perez APRN.CNP at 2022 1:25 PM EDT  
  
  
* Telephone Encounter - Mel Beach LPN - 2022 12:35 PM EDT  
  
Formatting of this note might be different from the original.  
Manual Readin/76 Pulse: 62  
  
Reason for blood pressure check - Last BP elevated  
  
Patient is:  
Taking medication as prescribed Yes  
Took medication today Yes If no, date medication last taken N/A  
Experiencing side effects No  
  
BP was elevated at last appt 22. No BP medication changes were made at that
 time. Taking all medications as prescribed. Denies any chest pain, unusual 
shortness of breath, dizziness, or headaches. No caffeine use. No personal 
history of tobacco use; no current exposure. Alert and oriented.  
  
Pt has been identified by name and birthdate: Yes  
  
Allergies reviewed: Yes  
Latex allergy: no.  
  
Medication - prescribed and OTC reviewed and updated: Yes  
Do you need any prescription refills prior to your next visit: No  
  
Health Maintenance: Reviewed and not up to date and provider notified  
  
Patient advised to continue with current medications and would be contacted if 
any further instructions after review by PCP.  
  
Mel Beach LPN  
  
  
  
  
  
Electronically signed by Mel Beach LPN at 2022 12:35 PM EDT  
  
  
documented in this encounterLicking [facility] Hospital03- History of Present 
illness Narrative* Mel Beach LPN - 2022 12:21 PM EDT  
  
Formatting of this note might be different from the original.  
Manual Readin/76 Pulse: 62  
  
Reason for blood pressure check - Last BP elevated  
  
Patient is:  
Taking medication as prescribed Yes  
Took medication today Yes If no, date medication last taken N/A  
Experiencing side effects No  
  
BP was elevated at last appt 22. No BP medication changes were made at that
 time. Taking all medications as prescribed. Denies any chest pain, unusual 
shortness of breath, dizziness, or headaches. No caffeine use. No personal 
history of tobacco use; no current exposure. Alert and oriented.  
  
Pt has been identified by name and birthdate: Yes  
  
Allergies reviewed: Yes  
Latex allergy: no.  
  
Medication - prescribed and OTC reviewed and updated: Yes  
Do you need any prescription refills prior to your next visit: No  
  
Health Maintenance: Reviewed and not up to date and provider notified  
  
Patient advised to continue with current medications and would be contacted if 
any further instructions after review by PCP.  
  
Mel Beach LPN  
  
  
  
  
Electronically signed by Mel Beach LPN at 2022 12:36 PM EDT  
  
  
documented in this encounterLicking [facility] Hospital01- History of Past illness 
Narrative*   
  
                          Problem      Noted Date   Diagnosed Date Resolved Date  
   
                          Obesity, Class II, BMI 35-39.9 2021  
   
                          Medicare annual wellness visit, subsequent 2019  
   
                                                    Encounter for long-term (cur  
rent) use of   
medications         2018  
   
                                                      
  
  
Overview:Formatting of this note might be different from the original.  
Added automatically from request for surgery [id number]  
   
   
                          History of colonic polyps 2018  
   
                                                      
  
  
Overview:Formatting of this note might be different from the original.  
Added automatically from request for surgery [id number]  
   
   
                          Gastroesophageal reflux disease 2018  
   
                                                      
  
  
Overview:Formatting of this note might be different from the original.  
Added automatically from request for surgery [id number]  
   
   
                          Acute pulmonary edema 10/05/2011                10/06/  
2011  
   
                                                      
  
  
Overview:Formatting of this note might be different from the original.  
10/5/2011  
cardiogenic and noncardiogenic factors  
  
   
   
                          Atelectasis  10/05/2011                10/06/2011  
   
                          Hypotension  10/04/2011                10/06/2011  
   
                                                      
  
  
Overview:Formatting of this note might be different from the original.  
10/4/2011  
goal map 65-80  
   
   
                          Stress hyperglycemia 10/04/2011                10/06/2  
011  
   
                                                      
  
  
Overview:Formatting of this note might be different from the original.  
10/4/2011  
Perioperative insulin resistance and exacerbation of hyperglycemia.  
Control blood glucose with titration of insulin infusion  
  
10/5/2011  
adequate control  
goal FBG<180  
  
   
   
                          Post-op pain 10/04/2011                10/06/2011  
   
                          Mechanically assisted ventilation 10/04/2011            
      10/05/2011  
   
                                                      
  
  
Overview:Formatting of this note might be different from the original.  
10/4/2011  
optimize MV settings, WTE  
current setting:FiO2, 75%, peep 8  
   
   
                          Cardiac insufficiency 10/04/2011                10/05/  
2011  
   
                                                      
  
  
Overview:Formatting of this note might be different from the original.  
10/4/2011  
RV mild to moderate post pump  
goal CI>2.3  
   
   
                          Hypoxemia    10/04/2011                10/06/2011  
   
                          discharge planning 2011                10/19/201  
1  
   
                                                      
  
  
Overview:Formatting of this note might be different from the original.  
age 71,  lives in [place], [place]. No DC needs.  
/  
   
   
                          Pre-op testing 2011                10/04/2011  
   
                                                      
  
  
Overview:Formatting of this note is different from the original.  
Images from the original note were not included.  
[facility]  
PRE-OP CHECKLIST  
  
Surgeon: Deangelo Angulo M.D. Informed Consent Completed: No  
STS Score: 1.4%  
CAD: Yes - CAD on Problem List: Yes  
Is intended procedure a CABG: Yes - is a beta blocker ordered? Yes  
  
H & P completed: Yes  
  
PA/LAT: Completed CT: Completed MRI: N/A LE US: N/A  
  
Cath: Yes - reviewed: Yes Echo:Completed EKG: Completed EF %: 61  
  
PI's: N/A Carotid: Completed Mapping: N/A Dental: Completed PFT's: N/A  
  
Basename 11 0729  
WBC 7.18  
HB 13.1  
HCT 41.8  
  
INR 1.0  
CREAT 1.35  
  
UA: Normal  
HCG:N/A  
  
ABO/ABO Confirmed: Yes  
  
Blood ordered: No  
  
SA Swab: Yes - results: Pending  
  
Last Dose of Anticoagulation: vitamins  
  
Op Note: N/A  
  
Pacemaker Check: N/A  
  
Consults: GI 2001  
  
DM: No  
  
Cardiac Surgical prep: No  
  
SIGNATURE: Krystal Castellanos RN CHECKED BY:  
DATE of SERVICE: 2011  
TIME of SERVICE: 2:23 PM  
  
  
   
   
                          Anemia of chronic disease 2011  
   
                          Intestinal polyp 2011  
   
                                                      
  
  
Overview:Formatting of this note might be different from the original.  
Distal ileum ulcerated polyp.  
   
   
                                                    Nonspecific abnormal results  
 of liver   
function study      2006  
   
                          Impotence of organic origin 2006  
   
                                                    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  
9.9 (morbid   
obesity)                                                    2021  
  
documented as of this encounter (statuses as of 2023)  
[facility]06- History of Past illness Narrative*   
  
                                Problem         Noted Date      Resolved Date  
   
                                Medicare annual wellness visit, subsequent 2019  
   
                                Encounter for long-term (current) use of medicat  
ions 2018  
   
                                                      
  
  
Overview:  
  
  
Formatting of this note might be different from the original.  
Added automatically from request for surgery [id number]   
   
                                History of colonic polyps 2018  
   
                                                      
  
  
Overview:  
  
  
Formatting of this note might be different from the original.  
Added automatically from request for surgery [id number]   
   
                                Gastroesophageal reflux disease 2018  
   
                                                      
  
  
Overview:  
  
  
Formatting of this note might be different from the original.  
Added automatically from request for surgery [id number]   
   
                                Acute pulmonary edema 10/05/2011      10/06/2011  
   
                                                      
  
  
Overview:  
  
  
Formatting of this note might be different from the original.  
10/5/2011  
cardiogenic and noncardiogenic factors  
   
   
                                Atelectasis     10/05/2011      10/06/2011  
   
                                Hypotension     10/04/2011      10/06/2011  
   
                                                      
  
  
Overview:  
  
  
Formatting of this note might be different from the original.  
10/4/2011  
goal map 65-80   
   
                                Stress hyperglycemia 10/04/2011      10/06/2011  
   
                                                      
  
  
Overview:  
  
  
Formatting of this note might be different from the original.  
10/4/2011  
Perioperative insulin resistance and exacerbation of hyperglycemia.  
Control blood glucose with titration of insulin infusion  
  
10/5/2011  
adequate control  
goal FBG<180  
   
   
                                Post-op pain    10/04/2011      10/06/2011  
   
                                Mechanically assisted ventilation 10/04/2011      
  10/05/2011  
   
                                                      
  
  
Overview:  
  
  
Formatting of this note might be different from the original.  
10/4/2011  
optimize MV settings, WTE  
current setting:FiO2, 75%, peep 8   
   
                                Cardiac insufficiency 10/04/2011      10/05/2011  
   
                                                      
  
  
Overview:  
  
  
Formatting of this note might be different from the original.  
10/4/2011  
RV mild to moderate post pump  
goal CI>2.3   
   
                                Hypoxemia       10/04/2011      10/06/2011  
   
                                discharge planning 2011      10/19/2011  
   
                                                      
  
  
Overview:  
  
  
Formatting of this note might be different from the original.  
age 71,  lives in [place], [place]. No DC needs.  
/   
   
                                Pre-op testing  2011      10/04/2011  
   
                                                      
  
  
Overview:  
  
  
Formatting of this note is different from the original.  
Images from the original note were not included.  
[facility]  
PRE-OP CHECKLIST  
  
Surgeon: Deangelo Angulo M.D. Informed Consent Completed: No  
STS Score: 1.4%  
CAD: Yes - CAD on Problem List: Yes  
Is intended procedure a CABG: Yes - is a beta blocker ordered? Yes  
  
H & P completed: Yes  
  
PA/LAT: Completed CT: Completed MRI: N/A LE US: N/A  
  
Cath: Yes - reviewed: Yes Echo:Completed EKG: Completed EF %: 61  
  
PI's: N/A Carotid: Completed Mapping: N/A Dental: Completed PFT's: N/A  
  
Basename 11 0729  
WBC 7.18  
HB 13.1  
HCT 41.8  
  
INR 1.0  
CREAT 1.35  
  
UA: Normal  
HCG:N/A  
  
ABO/ABO Confirmed: Yes  
  
Blood ordered: No  
  
SA Swab: Yes - results: Pending  
  
Last Dose of Anticoagulation: vitamins  
  
Op Note: N/A  
  
Pacemaker Check: N/A  
  
Consults: GI 2001  
  
DM: No  
  
Cardiac Surgical prep: No  
  
SIGNATURE: Krystal Castellanos RN CHECKED BY:  
DATE of SERVICE: 2011  
TIME of SERVICE: 2:23 PM  
  
   
   
                                Anemia of chronic disease 2011  
   
                                Intestinal polyp 2011  
   
                                                      
  
  
Overview:  
  
  
Formatting of this note might be different from the original.  
Distal ileum ulcerated polyp.   
   
                                Nonspecific abnormal results of liver function s  
tudy 2006  
   
                                Impotence of organic origin 2006  
   
                                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  
                 2021  
  
documented as of this encounter (statuses as of 2022)  
[facility]06- History of Past illness Narrative*   
  
                                Problem         Noted Date      Resolved Date  
   
                                Medicare annual wellness visit, subsequent 2019  
   
                                Encounter for long-term (current) use of medicat  
ions 2018  
   
                                                      
  
  
Overview:  
  
  
Formatting of this note might be different from the original.  
Added automatically from request for surgery [id number]   
   
                                History of colonic polyps 2018  
   
                                                      
  
  
Overview:  
  
  
Formatting of this note might be different from the original.  
Added automatically from request for surgery [id number]   
   
                                Gastroesophageal reflux disease 2018  
   
                                                      
  
  
Overview:  
  
  
Formatting of this note might be different from the original.  
Added automatically from request for surgery [id number]   
   
                                Acute pulmonary edema 10/05/2011      10/06/2011  
   
                                                      
  
  
Overview:  
  
  
Formatting of this note might be different from the original.  
10/5/2011  
cardiogenic and noncardiogenic factors  
   
   
                                Atelectasis     10/05/2011      10/06/2011  
   
                                Hypotension     10/04/2011      10/06/2011  
   
                                                      
  
  
Overview:  
  
  
Formatting of this note might be different from the original.  
10/4/2011  
goal map 65-80   
   
                                Stress hyperglycemia 10/04/2011      10/06/2011  
   
                                                      
  
  
Overview:  
  
  
Formatting of this note might be different from the original.  
10/4/2011  
Perioperative insulin resistance and exacerbation of hyperglycemia.  
Control blood glucose with titration of insulin infusion  
  
10/5/2011  
adequate control  
goal FBG<180  
   
   
                                Post-op pain    10/04/2011      10/06/2011  
   
                                Mechanically assisted ventilation 10/04/2011      
  10/05/2011  
   
                                                      
  
  
Overview:  
  
  
Formatting of this note might be different from the original.  
10/4/2011  
optimize MV settings, WTE  
current setting:FiO2, 75%, peep 8   
   
                                Cardiac insufficiency 10/04/2011      10/05/2011  
   
                                                      
  
  
Overview:  
  
  
Formatting of this note might be different from the original.  
10/4/2011  
RV mild to moderate post pump  
goal CI>2.3   
   
                                Hypoxemia       10/04/2011      10/06/2011  
   
                                discharge planning 2011      10/19/2011  
   
                                                      
  
  
Overview:  
  
  
Formatting of this note might be different from the original.  
age 71,  lives in [place], [place]. No DC needs.  
/   
   
                                Pre-op testing  2011      10/04/2011  
   
                                                      
  
  
Overview:  
  
  
Formatting of this note is different from the original.  
Images from the original note were not included.  
[facility]  
PRE-OP CHECKLIST  
  
Surgeon: Deangelo Angulo M.D. Informed Consent Completed: No  
STS Score: 1.4%  
CAD: Yes - CAD on Problem List: Yes  
Is intended procedure a CABG: Yes - is a beta blocker ordered? Yes  
  
H & P completed: Yes  
  
PA/LAT: Completed CT: Completed MRI: N/A LE US: N/A  
  
Cath: Yes - reviewed: Yes Echo:Completed EKG: Completed EF %: 61  
  
PI's: N/A Carotid: Completed Mapping: N/A Dental: Completed PFT's: N/A  
  
Basename 11 0729  
WBC 7.18  
HB 13.1  
HCT 41.8  
  
INR 1.0  
CREAT 1.35  
  
UA: Normal  
HCG:N/A  
  
ABO/ABO Confirmed: Yes  
  
Blood ordered: No  
  
SA Swab: Yes - results: Pending  
  
Last Dose of Anticoagulation: vitamins  
  
Op Note: N/A  
  
Pacemaker Check: N/A  
  
Consults: GI 2001  
  
DM: No  
  
Cardiac Surgical prep: No  
  
SIGNATURE: Krystal Castellanos RN CHECKED BY:  
DATE of SERVICE: 2011  
TIME of SERVICE: 2:23 PM  
  
   
   
                                Anemia of chronic disease 2011  
   
                                Intestinal polyp 2011  
   
                                                      
  
  
Overview:  
  
  
Formatting of this note might be different from the original.  
Distal ileum ulcerated polyp.   
   
                                Nonspecific abnormal results of liver function s  
tudy 2006  
   
                                Impotence of organic origin 2006  
   
                                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  
                 2021  
  
documented as of this encounter (statuses as of 2022)  
[facility]06- History of Past illness Narrative*   
  
                                Problem         Noted Date      Resolved Date  
   
                                Medicare annual wellness visit, subsequent 2019  
   
                                Encounter for long-term (current) use of medicat  
ions 2018  
   
                                                      
  
  
Overview:  
  
  
Formatting of this note might be different from the original.  
Added automatically from request for surgery [id number]   
   
                                History of colonic polyps 2018  
   
                                                      
  
  
Overview:  
  
  
Formatting of this note might be different from the original.  
Added automatically from request for surgery [id number]   
   
                                Gastroesophageal reflux disease 2018  
   
                                                      
  
  
Overview:  
  
  
Formatting of this note might be different from the original.  
Added automatically from request for surgery [id number]   
   
                                Acute pulmonary edema 10/05/2011      10/06/2011  
   
                                                      
  
  
Overview:  
  
  
Formatting of this note might be different from the original.  
10/5/2011  
cardiogenic and noncardiogenic factors  
   
   
                                Atelectasis     10/05/2011      10/06/2011  
   
                                Hypotension     10/04/2011      10/06/2011  
   
                                                      
  
  
Overview:  
  
  
Formatting of this note might be different from the original.  
10/4/2011  
goal map 65-80   
   
                                Stress hyperglycemia 10/04/2011      10/06/2011  
   
                                                      
  
  
Overview:  
  
  
Formatting of this note might be different from the original.  
10/4/2011  
Perioperative insulin resistance and exacerbation of hyperglycemia.  
Control blood glucose with titration of insulin infusion  
  
10/5/2011  
adequate control  
goal FBG<180  
   
   
                                Post-op pain    10/04/2011      10/06/2011  
   
                                Mechanically assisted ventilation 10/04/2011      
  10/05/2011  
   
                                                      
  
  
Overview:  
  
  
Formatting of this note might be different from the original.  
10/4/2011  
optimize MV settings, WTE  
current setting:FiO2, 75%, peep 8   
   
                                Cardiac insufficiency 10/04/2011      10/05/2011  
   
                                                      
  
  
Overview:  
  
  
Formatting of this note might be different from the original.  
10/4/2011  
RV mild to moderate post pump  
goal CI>2.3   
   
                                Hypoxemia       10/04/2011      10/06/2011  
   
                                discharge planning 2011      10/19/2011  
   
                                                      
  
  
Overview:  
  
  
Formatting of this note might be different from the original.  
age 71,  lives in [place], [place]. No DC needs.  
/   
   
                                Pre-op testing  2011      10/04/2011  
   
                                                      
  
  
Overview:  
  
  
Formatting of this note is different from the original.  
Images from the original note were not included.  
[facility]  
PRE-OP CHECKLIST  
  
Surgeon: Deangelo Angulo M.D. Informed Consent Completed: No  
STS Score: 1.4%  
CAD: Yes - CAD on Problem List: Yes  
Is intended procedure a CABG: Yes - is a beta blocker ordered? Yes  
  
H & P completed: Yes  
  
PA/LAT: Completed CT: Completed MRI: N/A LE US: N/A  
  
Cath: Yes - reviewed: Yes Echo:Completed EKG: Completed EF %: 61  
  
PI's: N/A Carotid: Completed Mapping: N/A Dental: Completed PFT's: N/A  
  
Basename 11 0729  
WBC 7.18  
HB 13.1  
HCT 41.8  
  
INR 1.0  
CREAT 1.35  
  
UA: Normal  
HCG:N/A  
  
ABO/ABO Confirmed: Yes  
  
Blood ordered: No  
  
SA Swab: Yes - results: Pending  
  
Last Dose of Anticoagulation: vitamins  
  
Op Note: N/A  
  
Pacemaker Check: N/A  
  
Consults: GI 2001  
  
DM: No  
  
Cardiac Surgical prep: No  
  
SIGNATURE: Krystal Castellanos RN CHECKED BY:  
DATE of SERVICE: 2011  
TIME of SERVICE: 2:23 PM  
  
   
   
                                Anemia of chronic disease 2011  
   
                                Intestinal polyp 2011  
   
                                                      
  
  
Overview:  
  
  
Formatting of this note might be different from the original.  
Distal ileum ulcerated polyp.   
   
                                Nonspecific abnormal results of liver function s  
pepe 2006  
   
                                Impotence of organic origin 2006      09/0  
2015  
   
                                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  
                 2021  
  
documented as of this encounter (statuses as of 2022)  
[facility]06- History of Past illness Narrative*   
  
                                Problem         Noted Date      Resolved Date  
   
                                Medicare annual wellness visit, subsequent 2019  
   
                                Encounter for long-term (current) use of medicat  
ions 2018  
   
                                                      
  
  
Overview:  
  
  
Formatting of this note might be different from the original.  
Added automatically from request for surgery [id number]   
   
                                History of colonic polyps 2018  
   
                                                      
  
  
Overview:  
  
  
Formatting of this note might be different from the original.  
Added automatically from request for surgery [id number]   
   
                                Gastroesophageal reflux disease 2018  
   
                                                      
  
  
Overview:  
  
  
Formatting of this note might be different from the original.  
Added automatically from request for surgery [id number]   
   
                                Acute pulmonary edema 10/05/2011      10/06/2011  
   
                                                      
  
  
Overview:  
  
  
Formatting of this note might be different from the original.  
10/5/2011  
cardiogenic and noncardiogenic factors  
   
   
                                Atelectasis     10/05/2011      10/06/2011  
   
                                Hypotension     10/04/2011      10/06/2011  
   
                                                      
  
  
Overview:  
  
  
Formatting of this note might be different from the original.  
10/4/2011  
goal map 65-80   
   
                                Stress hyperglycemia 10/04/2011      10/06/2011  
   
                                                      
  
  
Overview:  
  
  
Formatting of this note might be different from the original.  
10/4/2011  
Perioperative insulin resistance and exacerbation of hyperglycemia.  
Control blood glucose with titration of insulin infusion  
  
10/5/2011  
adequate control  
goal FBG<180  
   
   
                                Post-op pain    10/04/2011      10/06/2011  
   
                                Mechanically assisted ventilation 10/04/2011      
  10/05/2011  
   
                                                      
  
  
Overview:  
  
  
Formatting of this note might be different from the original.  
10/4/2011  
optimize MV settings, WTE  
current setting:FiO2, 75%, peep 8   
   
                                Cardiac insufficiency 10/04/2011      10/05/2011  
   
                                                      
  
  
Overview:  
  
  
Formatting of this note might be different from the original.  
10/4/2011  
RV mild to moderate post pump  
goal CI>2.3   
   
                                Hypoxemia       10/04/2011      10/06/2011  
   
                                discharge planning 2011      10/19/2011  
   
                                                      
  
  
Overview:  
  
  
Formatting of this note might be different from the original.  
age 71,  lives in [place], [place]. No DC needs.  
/   
   
                                Pre-op testing  2011      10/04/2011  
   
                                                      
  
  
Overview:  
  
  
Formatting of this note is different from the original.  
Images from the original note were not included.  
[facility]  
PRE-OP CHECKLIST  
  
Surgeon: Deangelo Angulo M.D. Informed Consent Completed: No  
STS Score: 1.4%  
CAD: Yes - CAD on Problem List: Yes  
Is intended procedure a CABG: Yes - is a beta blocker ordered? Yes  
  
H & P completed: Yes  
  
PA/LAT: Completed CT: Completed MRI: N/A LE US: N/A  
  
Cath: Yes - reviewed: Yes Echo:Completed EKG: Completed EF %: 61  
  
PI's: N/A Carotid: Completed Mapping: N/A Dental: Completed PFT's: N/A  
  
Basename 11 0729  
WBC 7.18  
HB 13.1  
HCT 41.8  
  
INR 1.0  
CREAT 1.35  
  
UA: Normal  
HCG:N/A  
  
ABO/ABO Confirmed: Yes  
  
Blood ordered: No  
  
SA Swab: Yes - results: Pending  
  
Last Dose of Anticoagulation: vitamins  
  
Op Note: N/A  
  
Pacemaker Check: N/A  
  
Consults: GI 2001  
  
DM: No  
  
Cardiac Surgical prep: No  
  
SIGNATURE: Krystal Castellanos RN CHECKED BY:  
DATE of SERVICE: 2011  
TIME of SERVICE: 2:23 PM  
  
   
   
                                Anemia of chronic disease 2011  
   
                                Intestinal polyp 2011  
   
                                                      
  
  
Overview:  
  
  
Formatting of this note might be different from the original.  
Distal ileum ulcerated polyp.   
   
                                Nonspecific abnormal results of liver function s  
tudy 2006  
   
                                Impotence of organic origin 2006  
   
                                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  
                 2021  
  
documented as of this encounter (statuses as of 2022)  
[facility]06- History of Past illness Narrative*   
  
                                Problem         Noted Date      Resolved Date  
   
                                Medicare annual wellness visit, subsequent 2019  
   
                                Encounter for long-term (current) use of medicat  
ions 2018  
   
                                                      
  
  
Overview:  
  
  
Formatting of this note might be different from the original.  
Added automatically from request for surgery [id number]   
   
                                History of colonic polyps 2018  
   
                                                      
  
  
Overview:  
  
  
Formatting of this note might be different from the original.  
Added automatically from request for surgery [id number]   
   
                                Gastroesophageal reflux disease 2018  
   
                                                      
  
  
Overview:  
  
  
Formatting of this note might be different from the original.  
Added automatically from request for surgery [id number]   
   
                                Acute pulmonary edema 10/05/2011      10/06/2011  
   
                                                      
  
  
Overview:  
  
  
Formatting of this note might be different from the original.  
10/5/2011  
cardiogenic and noncardiogenic factors  
   
   
                                Atelectasis     10/05/2011      10/06/2011  
   
                                Hypotension     10/04/2011      10/06/2011  
   
                                                      
  
  
Overview:  
  
  
Formatting of this note might be different from the original.  
10/4/2011  
goal map 65-80   
   
                                Stress hyperglycemia 10/04/2011      10/06/2011  
   
                                                      
  
  
Overview:  
  
  
Formatting of this note might be different from the original.  
10/4/2011  
Perioperative insulin resistance and exacerbation of hyperglycemia.  
Control blood glucose with titration of insulin infusion  
  
10/5/2011  
adequate control  
goal FBG<180  
   
   
                                Post-op pain    10/04/2011      10/06/2011  
   
                                Mechanically assisted ventilation 10/04/2011      
  10/05/2011  
   
                                                      
  
  
Overview:  
  
  
Formatting of this note might be different from the original.  
10/4/2011  
optimize MV settings, WTE  
current setting:FiO2, 75%, peep 8   
   
                                Cardiac insufficiency 10/04/2011      10/05/2011  
   
                                                      
  
  
Overview:  
  
  
Formatting of this note might be different from the original.  
10/4/2011  
RV mild to moderate post pump  
goal CI>2.3   
   
                                Hypoxemia       10/04/2011      10/06/2011  
   
                                discharge planning 2011      10/19/2011  
   
                                                      
  
  
Overview:  
  
  
Formatting of this note might be different from the original.  
age 71,  lives in [place], [place]. No DC needs.  
/   
   
                                Pre-op testing  2011      10/04/2011  
   
                                                      
  
  
Overview:  
  
  
Formatting of this note is different from the original.  
Images from the original note were not included.  
[facility]  
PRE-OP CHECKLIST  
  
Surgeon: Deangelo Angulo M.D. Informed Consent Completed: No  
STS Score: 1.4%  
CAD: Yes - CAD on Problem List: Yes  
Is intended procedure a CABG: Yes - is a beta blocker ordered? Yes  
  
H & P completed: Yes  
  
PA/LAT: Completed CT: Completed MRI: N/A LE US: N/A  
  
Cath: Yes - reviewed: Yes Echo:Completed EKG: Completed EF %: 61  
  
PI's: N/A Carotid: Completed Mapping: N/A Dental: Completed PFT's: N/A  
  
Basename 11 0729  
WBC 7.18  
HB 13.1  
HCT 41.8  
  
INR 1.0  
CREAT 1.35  
  
UA: Normal  
HCG:N/A  
  
ABO/ABO Confirmed: Yes  
  
Blood ordered: No  
  
SA Swab: Yes - results: Pending  
  
Last Dose of Anticoagulation: vitamins  
  
Op Note: N/A  
  
Pacemaker Check: N/A  
  
Consults: GI 2001  
  
DM: No  
  
Cardiac Surgical prep: No  
  
SIGNATURE: Krystal Castellanos RN CHECKED BY:  
DATE of SERVICE: 2011  
TIME of SERVICE: 2:23 PM  
  
   
   
                                Anemia of chronic disease 2011  
   
                                Intestinal polyp 2011  
   
                                                      
  
  
Overview:  
  
  
Formatting of this note might be different from the original.  
Distal ileum ulcerated polyp.   
   
                                Nonspecific abnormal results of liver function s  
nialldy 2006  
   
                                Impotence of organic origin 2006  
   
                                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  
                 2021  
  
documented as of this encounter (statuses as of 2022)  
[facility]06- History of Past illness Narrative*   
  
                                Problem         Noted Date      Resolved Date  
   
                                Medicare annual wellness visit, subsequent 2019  
   
                                Encounter for long-term (current) use of medicat  
ions 2018  
   
                                                      
  
  
Overview:  
  
  
Formatting of this note might be different from the original.  
Added automatically from request for surgery [id number]   
   
                                History of colonic polyps 2018  
   
                                                      
  
  
Overview:  
  
  
Formatting of this note might be different from the original.  
Added automatically from request for surgery [id number]   
   
                                Gastroesophageal reflux disease 2018  
   
                                                      
  
  
Overview:  
  
  
Formatting of this note might be different from the original.  
Added automatically from request for surgery [id number]   
   
                                Acute pulmonary edema 10/05/2011      10/06/2011  
   
                                                      
  
  
Overview:  
  
  
Formatting of this note might be different from the original.  
10/5/2011  
cardiogenic and noncardiogenic factors  
   
   
                                Atelectasis     10/05/2011      10/06/2011  
   
                                Hypotension     10/04/2011      10/06/2011  
   
                                                      
  
  
Overview:  
  
  
Formatting of this note might be different from the original.  
10/4/2011  
goal map 65-80   
   
                                Stress hyperglycemia 10/04/2011      10/06/2011  
   
                                                      
  
  
Overview:  
  
  
Formatting of this note might be different from the original.  
10/4/2011  
Perioperative insulin resistance and exacerbation of hyperglycemia.  
Control blood glucose with titration of insulin infusion  
  
10/5/2011  
adequate control  
goal FBG<180  
   
   
                                Post-op pain    10/04/2011      10/06/2011  
   
                                Mechanically assisted ventilation 10/04/2011      
  10/05/2011  
   
                                                      
  
  
Overview:  
  
  
Formatting of this note might be different from the original.  
10/4/2011  
optimize MV settings, WTE  
current setting:FiO2, 75%, peep 8   
   
                                Cardiac insufficiency 10/04/2011      10/05/2011  
   
                                                      
  
  
Overview:  
  
  
Formatting of this note might be different from the original.  
10/4/2011  
RV mild to moderate post pump  
goal CI>2.3   
   
                                Hypoxemia       10/04/2011      10/06/2011  
   
                                discharge planning 2011      10/19/2011  
   
                                                      
  
  
Overview:  
  
  
Formatting of this note might be different from the original.  
age 71,  lives in [place], [place]. No DC needs.  
/   
   
                                Pre-op testing  2011      10/04/2011  
   
                                                      
  
  
Overview:  
  
  
Formatting of this note is different from the original.  
Images from the original note were not included.  
[facility]  
PRE-OP CHECKLIST  
  
Surgeon: Deangelo Angulo M.D. Informed Consent Completed: No  
STS Score: 1.4%  
CAD: Yes - CAD on Problem List: Yes  
Is intended procedure a CABG: Yes - is a beta blocker ordered? Yes  
  
H & P completed: Yes  
  
PA/LAT: Completed CT: Completed MRI: N/A LE US: N/A  
  
Cath: Yes - reviewed: Yes Echo:Completed EKG: Completed EF %: 61  
  
PI's: N/A Carotid: Completed Mapping: N/A Dental: Completed PFT's: N/A  
  
Basename 11 0729  
WBC 7.18  
HB 13.1  
HCT 41.8  
  
INR 1.0  
CREAT 1.35  
  
UA: Normal  
HCG:N/A  
  
ABO/ABO Confirmed: Yes  
  
Blood ordered: No  
  
SA Swab: Yes - results: Pending  
  
Last Dose of Anticoagulation: vitamins  
  
Op Note: N/A  
  
Pacemaker Check: N/A  
  
Consults: GI 2001  
  
DM: No  
  
Cardiac Surgical prep: No  
  
SIGNATURE: Krystal Castellanos RN CHECKED BY:  
DATE of SERVICE: 2011  
TIME of SERVICE: 2:23 PM  
  
   
   
                                Anemia of chronic disease 2011  
   
                                Intestinal polyp 2011  
   
                                                      
  
  
Overview:  
  
  
Formatting of this note might be different from the original.  
Distal ileum ulcerated polyp.   
   
                                Nonspecific abnormal results of liver function s  
tudy 2006  
   
                                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  
                 2021  
  
documented as of this encounter (statuses as of 2022)  
[facility]06- History of Past illness Narrative*   
  
                                Problem         Noted Date      Resolved Date  
   
                                Medicare annual wellness visit, subsequent 2019  
   
                                Encounter for long-term (current) use of medicat  
ions 2018  
   
                                                      
  
  
Overview:Formatting of this note might be different from the original.  
Added automatically from request for surgery [id number]  
   
   
                                History of colonic polyps 2018  
   
                                                      
  
  
Overview:Formatting of this note might be different from the original.  
Added automatically from request for surgery [id number]  
   
   
                                Gastroesophageal reflux disease 2018  
   
                                                      
  
  
Overview:Formatting of this note might be different from the original.  
Added automatically from request for surgery [id number]  
   
   
                                Acute pulmonary edema 10/05/2011      10/06/2011  
   
                                                      
  
  
Overview:Formatting of this note might be different from the original.  
10/5/2011  
cardiogenic and noncardiogenic factors  
  
   
   
                                Atelectasis     10/05/2011      10/06/2011  
   
                                Hypotension     10/04/2011      10/06/2011  
   
                                                      
  
  
Overview:Formatting of this note might be different from the original.  
10/4/2011  
goal map 65-80  
   
   
                                Stress hyperglycemia 10/04/2011      10/06/2011  
   
                                                      
  
  
Overview:Formatting of this note might be different from the original.  
10/4/2011  
Perioperative insulin resistance and exacerbation of hyperglycemia.  
Control blood glucose with titration of insulin infusion  
  
10/5/2011  
adequate control  
goal FBG<180  
  
   
   
                                Post-op pain    10/04/2011      10/06/2011  
   
                                Mechanically assisted ventilation 10/04/2011      
  10/05/2011  
   
                                                      
  
  
Overview:Formatting of this note might be different from the original.  
10/4/2011  
optimize MV settings, WTE  
current setting:FiO2, 75%, peep 8  
   
   
                                Cardiac insufficiency 10/04/2011      10/05/2011  
   
                                                      
  
  
Overview:Formatting of this note might be different from the original.  
10/4/2011  
RV mild to moderate post pump  
goal CI>2.3  
   
   
                                Hypoxemia       10/04/2011      10/06/2011  
   
                                discharge planning 2011      10/19/2011  
   
                                                      
  
  
Overview:Formatting of this note might be different from the original.  
age 71,  lives in [place], [place]. No DC needs.  
/  
   
   
                                Pre-op testing  2011      10/04/2011  
   
                                                      
  
  
Overview:Formatting of this note is different from the original.  
Images from the original note were not included.  
[facility]  
PRE-OP CHECKLIST  
  
Surgeon: Deangelo Angulo M.D. Informed Consent Completed: No  
STS Score: 1.4%  
CAD: Yes - CAD on Problem List: Yes  
Is intended procedure a CABG: Yes - is a beta blocker ordered? Yes  
  
H & P completed: Yes  
  
PA/LAT: Completed CT: Completed MRI: N/A LE US: N/A  
  
Cath: Yes - reviewed: Yes Echo:Completed EKG: Completed EF %: 61  
  
PI's: N/A Carotid: Completed Mapping: N/A Dental: Completed PFT's: N/A  
  
Basename 11 0729  
WBC 7.18  
HB 13.1  
HCT 41.8  
  
INR 1.0  
CREAT 1.35  
  
UA: Normal  
HCG:N/A  
  
ABO/ABO Confirmed: Yes  
  
Blood ordered: No  
  
SA Swab: Yes - results: Pending  
  
Last Dose of Anticoagulation: vitamins  
  
Op Note: N/A  
  
Pacemaker Check: N/A  
  
Consults: GI 2001  
  
DM: No  
  
Cardiac Surgical prep: No  
  
SIGNATURE: Krystal Castellanos RN CHECKED BY:  
DATE of SERVICE: 2011  
TIME of SERVICE: 2:23 PM  
  
  
   
   
                                Anemia of chronic disease 2011  
   
                                Intestinal polyp 2011  
   
                                                      
  
  
Overview:Formatting of this note might be different from the original.  
Distal ileum ulcerated polyp.  
   
   
                                Nonspecific abnormal results of liver function s  
pepe 2006  
   
                                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  
                 2021  
  
documented as of this encounter (statuses as of 2022)  
[facility]06- History of Past illness Narrative*   
  
                                Problem         Noted Date      Resolved Date  
   
                                Medicare annual wellness visit, subsequent 2019  
   
                                Encounter for long-term (current) use of medicat  
ions 2018  
   
                                                      
  
  
Overview:Formatting of this note might be different from the original.  
Added automatically from request for surgery [id number]  
   
   
                                History of colonic polyps 2018  
   
                                                      
  
  
Overview:Formatting of this note might be different from the original.  
Added automatically from request for surgery [id number]  
   
   
                                Gastroesophageal reflux disease 2018  
   
                                                      
  
  
Overview:Formatting of this note might be different from the original.  
Added automatically from request for surgery [id number]  
   
   
                                Acute pulmonary edema 10/05/2011      10/06/2011  
   
                                                      
  
  
Overview:Formatting of this note might be different from the original.  
10/5/2011  
cardiogenic and noncardiogenic factors  
  
   
   
                                Atelectasis     10/05/2011      10/06/2011  
   
                                Hypotension     10/04/2011      10/06/2011  
   
                                                      
  
  
Overview:Formatting of this note might be different from the original.  
10/4/2011  
goal map 65-80  
   
   
                                Stress hyperglycemia 10/04/2011      10/06/2011  
   
                                                      
  
  
Overview:Formatting of this note might be different from the original.  
10/4/2011  
Perioperative insulin resistance and exacerbation of hyperglycemia.  
Control blood glucose with titration of insulin infusion  
  
10/5/2011  
adequate control  
goal FBG<180  
  
   
   
                                Post-op pain    10/04/2011      10/06/2011  
   
                                Mechanically assisted ventilation 10/04/2011      
  10/05/2011  
   
                                                      
  
  
Overview:Formatting of this note might be different from the original.  
10/4/2011  
optimize MV settings, WTE  
current setting:FiO2, 75%, peep 8  
   
   
                                Cardiac insufficiency 10/04/2011      10/05/2011  
   
                                                      
  
  
Overview:Formatting of this note might be different from the original.  
10/4/2011  
RV mild to moderate post pump  
goal CI>2.3  
   
   
                                Hypoxemia       10/04/2011      10/06/2011  
   
                                discharge planning 2011      10/19/2011  
   
                                                      
  
  
Overview:Formatting of this note might be different from the original.  
age 71,  lives in [place], [place]. No DC needs.  
/  
   
   
                                Pre-op testing  2011      10/04/2011  
   
                                                      
  
  
Overview:Formatting of this note is different from the original.  
Images from the original note were not included.  
[facility]  
PRE-OP CHECKLIST  
  
Surgeon: Deangelo Angulo M.D. Informed Consent Completed: No  
STS Score: 1.4%  
CAD: Yes - CAD on Problem List: Yes  
Is intended procedure a CABG: Yes - is a beta blocker ordered? Yes  
  
H & P completed: Yes  
  
PA/LAT: Completed CT: Completed MRI: N/A LE US: N/A  
  
Cath: Yes - reviewed: Yes Echo:Completed EKG: Completed EF %: 61  
  
PI's: N/A Carotid: Completed Mapping: N/A Dental: Completed PFT's: N/A  
  
Basename 11 0729  
WBC 7.18  
HB 13.1  
HCT 41.8  
  
INR 1.0  
CREAT 1.35  
  
UA: Normal  
HCG:N/A  
  
ABO/ABO Confirmed: Yes  
  
Blood ordered: No  
  
SA Swab: Yes - results: Pending  
  
Last Dose of Anticoagulation: vitamins  
  
Op Note: N/A  
  
Pacemaker Check: N/A  
  
Consults: GI 2001  
  
DM: No  
  
Cardiac Surgical prep: No  
  
SIGNATURE: Krystal Castellanos RN CHECKED BY:  
DATE of SERVICE: 2011  
TIME of SERVICE: 2:23 PM  
  
  
   
   
                                Anemia of chronic disease 2011  
   
                                Intestinal polyp 2011  
   
                                                      
  
  
Overview:Formatting of this note might be different from the original.  
Distal ileum ulcerated polyp.  
   
   
                                Nonspecific abnormal results of liver function s  
tudy 2006  
   
                                Impotence of organic origin 2006  
   
                                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  
                 2021  
  
documented as of this encounter (statuses as of 2022)  
[facility]06- History of Past illness Narrative*   
  
                                Problem         Noted Date      Resolved Date  
   
                                Medicare annual wellness visit, subsequent 2019  
   
                                Encounter for long-term (current) use of medicat  
ions 2018  
   
                                                      
  
  
Overview:Formatting of this note might be different from the original.  
Added automatically from request for surgery [id number]  
   
   
                                History of colonic polyps 2018  
   
                                                      
  
  
Overview:Formatting of this note might be different from the original.  
Added automatically from request for surgery [id number]  
   
   
                                Gastroesophageal reflux disease 2018  
   
                                                      
  
  
Overview:Formatting of this note might be different from the original.  
Added automatically from request for surgery [id number]  
   
   
                                Acute pulmonary edema 10/05/2011      10/06/2011  
   
                                                      
  
  
Overview:Formatting of this note might be different from the original.  
10/5/2011  
cardiogenic and noncardiogenic factors  
  
   
   
                                Atelectasis     10/05/2011      10/06/2011  
   
                                Hypotension     10/04/2011      10/06/2011  
   
                                                      
  
  
Overview:Formatting of this note might be different from the original.  
10/4/2011  
goal map 65-80  
   
   
                                Stress hyperglycemia 10/04/2011      10/06/2011  
   
                                                      
  
  
Overview:Formatting of this note might be different from the original.  
10/4/2011  
Perioperative insulin resistance and exacerbation of hyperglycemia.  
Control blood glucose with titration of insulin infusion  
  
10/5/2011  
adequate control  
goal FBG<180  
  
   
   
                                Post-op pain    10/04/2011      10/06/2011  
   
                                Mechanically assisted ventilation 10/04/2011      
  10/05/2011  
   
                                                      
  
  
Overview:Formatting of this note might be different from the original.  
10/4/2011  
optimize MV settings, WTE  
current setting:FiO2, 75%, peep 8  
   
   
                                Cardiac insufficiency 10/04/2011      10/05/2011  
   
                                                      
  
  
Overview:Formatting of this note might be different from the original.  
10/4/2011  
RV mild to moderate post pump  
goal CI>2.3  
   
   
                                Hypoxemia       10/04/2011      10/06/2011  
   
                                discharge planning 2011      10/19/2011  
   
                                                      
  
  
Overview:Formatting of this note might be different from the original.  
age 71,  lives in [place], [place]. No DC needs.  
/  
   
   
                                Pre-op testing  2011      10/04/2011  
   
                                                      
  
  
Overview:Formatting of this note is different from the original.  
Images from the original note were not included.  
[facility]  
PRE-OP CHECKLIST  
  
Surgeon: Deangelo Angulo M.D. Informed Consent Completed: No  
STS Score: 1.4%  
CAD: Yes - CAD on Problem List: Yes  
Is intended procedure a CABG: Yes - is a beta blocker ordered? Yes  
  
H & P completed: Yes  
  
PA/LAT: Completed CT: Completed MRI: N/A LE US: N/A  
  
Cath: Yes - reviewed: Yes Echo:Completed EKG: Completed EF %: 61  
  
PI's: N/A Carotid: Completed Mapping: N/A Dental: Completed PFT's: N/A  
  
Basename 11 0729  
WBC 7.18  
HB 13.1  
HCT 41.8  
  
INR 1.0  
CREAT 1.35  
  
UA: Normal  
HCG:N/A  
  
ABO/ABO Confirmed: Yes  
  
Blood ordered: No  
  
SA Swab: Yes - results: Pending  
  
Last Dose of Anticoagulation: vitamins  
  
Op Note: N/A  
  
Pacemaker Check: N/A  
  
Consults: GI 2001  
  
DM: No  
  
Cardiac Surgical prep: No  
  
SIGNATURE: Krystal Castellanos RN CHECKED BY:  
DATE of SERVICE: 2011  
TIME of SERVICE: 2:23 PM  
  
  
   
   
                                Anemia of chronic disease 2011  
   
                                Intestinal polyp 2011  
   
                                                      
  
  
Overview:Formatting of this note might be different from the original.  
Distal ileum ulcerated polyp.  
   
   
                                Nonspecific abnormal results of liver function s  
tudy 2006  
   
                                Impotence of organic origin 2006  
   
                                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  
                 2021  
  
documented as of this encounter (statuses as of 2022)  
[facility]06- History of Past illness Narrative*   
  
                                Problem         Noted Date      Resolved Date  
   
                                Medicare annual wellness visit, subsequent 2019  
   
                                Encounter for long-term (current) use of medicat  
ions 2018  
   
                                                      
  
  
Overview:Formatting of this note might be different from the original.  
Added automatically from request for surgery [id number]  
   
   
                                History of colonic polyps 2018  
   
                                                      
  
  
Overview:Formatting of this note might be different from the original.  
Added automatically from request for surgery [id number]  
   
   
                                Gastroesophageal reflux disease 2018  
   
                                                      
  
  
Overview:Formatting of this note might be different from the original.  
Added automatically from request for surgery [id number]  
   
   
                                Acute pulmonary edema 10/05/2011      10/06/2011  
   
                                                      
  
  
Overview:Formatting of this note might be different from the original.  
10/5/2011  
cardiogenic and noncardiogenic factors  
  
   
   
                                Atelectasis     10/05/2011      10/06/2011  
   
                                Hypotension     10/04/2011      10/06/2011  
   
                                                      
  
  
Overview:Formatting of this note might be different from the original.  
10/4/2011  
goal map 65-80  
   
   
                                Stress hyperglycemia 10/04/2011      10/06/2011  
   
                                                      
  
  
Overview:Formatting of this note might be different from the original.  
10/4/2011  
Perioperative insulin resistance and exacerbation of hyperglycemia.  
Control blood glucose with titration of insulin infusion  
  
10/5/2011  
adequate control  
goal FBG<180  
  
   
   
                                Post-op pain    10/04/2011      10/06/2011  
   
                                Mechanically assisted ventilation 10/04/2011      
  10/05/2011  
   
                                                      
  
  
Overview:Formatting of this note might be different from the original.  
10/4/2011  
optimize MV settings, WTE  
current setting:FiO2, 75%, peep 8  
   
   
                                Cardiac insufficiency 10/04/2011      10/05/2011  
   
                                                      
  
  
Overview:Formatting of this note might be different from the original.  
10/4/2011  
RV mild to moderate post pump  
goal CI>2.3  
   
   
                                Hypoxemia       10/04/2011      10/06/2011  
   
                                discharge planning 2011      10/19/2011  
   
                                                      
  
  
Overview:Formatting of this note might be different from the original.  
age 71,  lives in [place], [place]. No DC needs.  
/  
   
   
                                Pre-op testing  2011      10/04/2011  
   
                                                      
  
  
Overview:Formatting of this note is different from the original.  
Images from the original note were not included.  
[facility]  
PRE-OP CHECKLIST  
  
Surgeon: Deangelo Angulo M.D. Informed Consent Completed: No  
STS Score: 1.4%  
CAD: Yes - CAD on Problem List: Yes  
Is intended procedure a CABG: Yes - is a beta blocker ordered? Yes  
  
H & P completed: Yes  
  
PA/LAT: Completed CT: Completed MRI: N/A LE US: N/A  
  
Cath: Yes - reviewed: Yes Echo:Completed EKG: Completed EF %: 61  
  
PI's: N/A Carotid: Completed Mapping: N/A Dental: Completed PFT's: N/A  
  
Basename 11 0729  
WBC 7.18  
HB 13.1  
HCT 41.8  
  
INR 1.0  
CREAT 1.35  
  
UA: Normal  
HCG:N/A  
  
ABO/ABO Confirmed: Yes  
  
Blood ordered: No  
  
SA Swab: Yes - results: Pending  
  
Last Dose of Anticoagulation: vitamins  
  
Op Note: N/A  
  
Pacemaker Check: N/A  
  
Consults: GI 2001  
  
DM: No  
  
Cardiac Surgical prep: No  
  
SIGNATURE: Krystal Castellanos RN CHECKED BY:  
DATE of SERVICE: 2011  
TIME of SERVICE: 2:23 PM  
  
  
   
   
                                Anemia of chronic disease 2011  
   
                                Intestinal polyp 2011  
   
                                                      
  
  
Overview:Formatting of this note might be different from the original.  
Distal ileum ulcerated polyp.  
   
   
                                Nonspecific abnormal results of liver function s  
tudy 2006  
   
                                Impotence of organic origin 2006  
   
                                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  
                 2021  
  
documented as of this encounter (statuses as of 2022)  
[facility]06- History of Past illness Narrative*   
  
                                Problem         Noted Date      Resolved Date  
   
                                Medicare annual wellness visit, subsequent 2019  
   
                                Encounter for long-term (current) use of medicat  
ions 2018  
   
                                                      
  
  
Overview:Formatting of this note might be different from the original.  
Added automatically from request for surgery [id number]  
   
   
                                History of colonic polyps 2018  
   
                                                      
  
  
Overview:Formatting of this note might be different from the original.  
Added automatically from request for surgery [id number]  
   
   
                                Gastroesophageal reflux disease 2018  
   
                                                      
  
  
Overview:Formatting of this note might be different from the original.  
Added automatically from request for surgery [id number]  
   
   
                                Acute pulmonary edema 10/05/2011      10/06/2011  
   
                                                      
  
  
Overview:Formatting of this note might be different from the original.  
10/5/2011  
cardiogenic and noncardiogenic factors  
  
   
   
                                Atelectasis     10/05/2011      10/06/2011  
   
                                Hypotension     10/04/2011      10/06/2011  
   
                                                      
  
  
Overview:Formatting of this note might be different from the original.  
10/4/2011  
goal map 65-80  
   
   
                                Stress hyperglycemia 10/04/2011      10/06/2011  
   
                                                      
  
  
Overview:Formatting of this note might be different from the original.  
10/4/2011  
Perioperative insulin resistance and exacerbation of hyperglycemia.  
Control blood glucose with titration of insulin infusion  
  
10/5/2011  
adequate control  
goal FBG<180  
  
   
   
                                Post-op pain    10/04/2011      10/06/2011  
   
                                Mechanically assisted ventilation 10/04/2011      
  10/05/2011  
   
                                                      
  
  
Overview:Formatting of this note might be different from the original.  
10/4/2011  
optimize MV settings, WTE  
current setting:FiO2, 75%, peep 8  
   
   
                                Cardiac insufficiency 10/04/2011      10/05/2011  
   
                                                      
  
  
Overview:Formatting of this note might be different from the original.  
10/4/2011  
RV mild to moderate post pump  
goal CI>2.3  
   
   
                                Hypoxemia       10/04/2011      10/06/2011  
   
                                discharge planning 2011      10/19/2011  
   
                                                      
  
  
Overview:Formatting of this note might be different from the original.  
age 71,  lives in [place], [place]. No DC needs.  
/  
   
   
                                Pre-op testing  2011      10/04/2011  
   
                                                      
  
  
Overview:Formatting of this note is different from the original.  
Images from the original note were not included.  
[facility]  
PRE-OP CHECKLIST  
  
Surgeon: Deangelo Angulo M.D. Informed Consent Completed: No  
STS Score: 1.4%  
CAD: Yes - CAD on Problem List: Yes  
Is intended procedure a CABG: Yes - is a beta blocker ordered? Yes  
  
H & P completed: Yes  
  
PA/LAT: Completed CT: Completed MRI: N/A LE US: N/A  
  
Cath: Yes - reviewed: Yes Echo:Completed EKG: Completed EF %: 61  
  
PI's: N/A Carotid: Completed Mapping: N/A Dental: Completed PFT's: N/A  
  
Basename 11 0729  
WBC 7.18  
HB 13.1  
HCT 41.8  
  
INR 1.0  
CREAT 1.35  
  
UA: Normal  
HCG:N/A  
  
ABO/ABO Confirmed: Yes  
  
Blood ordered: No  
  
SA Swab: Yes - results: Pending  
  
Last Dose of Anticoagulation: vitamins  
  
Op Note: N/A  
  
Pacemaker Check: N/A  
  
Consults: GI 2001  
  
DM: No  
  
Cardiac Surgical prep: No  
  
SIGNATURE: Krystal Castellanos RN CHECKED BY:  
DATE of SERVICE: 2011  
TIME of SERVICE: 2:23 PM  
  
  
   
   
                                Anemia of chronic disease 2011  
   
                                Intestinal polyp 2011  
   
                                                      
  
  
Overview:Formatting of this note might be different from the original.  
Distal ileum ulcerated polyp.  
   
   
                                Nonspecific abnormal results of liver function s  
nialldy 2006  
   
                                Impotence of organic origin 2006  
   
                                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  
                 2021  
  
documented as of this encounter (statuses as of 2022)  
[facility]06- History of Past illness Narrative*   
  
                                Problem         Noted Date      Resolved Date  
   
                                Medicare annual wellness visit, subsequent 2019  
   
                                Encounter for long-term (current) use of medicat  
ions 2018  
   
                                                      
  
  
Overview:Formatting of this note might be different from the original.  
Added automatically from request for surgery [id number]  
   
   
                                History of colonic polyps 2018  
   
                                                      
  
  
Overview:Formatting of this note might be different from the original.  
Added automatically from request for surgery [id number]  
   
   
                                Gastroesophageal reflux disease 2018  
   
                                                      
  
  
Overview:Formatting of this note might be different from the original.  
Added automatically from request for surgery [id number]  
   
   
                                Acute pulmonary edema 10/05/2011      10/06/2011  
   
                                                      
  
  
Overview:Formatting of this note might be different from the original.  
10/5/2011  
cardiogenic and noncardiogenic factors  
  
   
   
                                Atelectasis     10/05/2011      10/06/2011  
   
                                Hypotension     10/04/2011      10/06/2011  
   
                                                      
  
  
Overview:Formatting of this note might be different from the original.  
10/4/2011  
goal map 65-80  
   
   
                                Stress hyperglycemia 10/04/2011      10/06/2011  
   
                                                      
  
  
Overview:Formatting of this note might be different from the original.  
10/4/2011  
Perioperative insulin resistance and exacerbation of hyperglycemia.  
Control blood glucose with titration of insulin infusion  
  
10/5/2011  
adequate control  
goal FBG<180  
  
   
   
                                Post-op pain    10/04/2011      10/06/2011  
   
                                Mechanically assisted ventilation 10/04/2011      
  10/05/2011  
   
                                                      
  
  
Overview:Formatting of this note might be different from the original.  
10/4/2011  
optimize MV settings, WTE  
current setting:FiO2, 75%, peep 8  
   
   
                                Cardiac insufficiency 10/04/2011      10/05/2011  
   
                                                      
  
  
Overview:Formatting of this note might be different from the original.  
10/4/2011  
RV mild to moderate post pump  
goal CI>2.3  
   
   
                                Hypoxemia       10/04/2011      10/06/2011  
   
                                discharge planning 2011      10/19/2011  
   
                                                      
  
  
Overview:Formatting of this note might be different from the original.  
age 71,  lives in [place], [place]. No DC needs.  
/  
   
   
                                Pre-op testing  2011      10/04/2011  
   
                                                      
  
  
Overview:Formatting of this note is different from the original.  
Images from the original note were not included.  
[facility]  
PRE-OP CHECKLIST  
  
Surgeon: Deangelo Angulo M.D. Informed Consent Completed: No  
STS Score: 1.4%  
CAD: Yes - CAD on Problem List: Yes  
Is intended procedure a CABG: Yes - is a beta blocker ordered? Yes  
  
H & P completed: Yes  
  
PA/LAT: Completed CT: Completed MRI: N/A LE US: N/A  
  
Cath: Yes - reviewed: Yes Echo:Completed EKG: Completed EF %: 61  
  
PI's: N/A Carotid: Completed Mapping: N/A Dental: Completed PFT's: N/A  
  
Basename 11 0729  
WBC 7.18  
HB 13.1  
HCT 41.8  
  
INR 1.0  
CREAT 1.35  
  
UA: Normal  
HCG:N/A  
  
ABO/ABO Confirmed: Yes  
  
Blood ordered: No  
  
SA Swab: Yes - results: Pending  
  
Last Dose of Anticoagulation: vitamins  
  
Op Note: N/A  
  
Pacemaker Check: N/A  
  
Consults: GI 2001  
  
DM: No  
  
Cardiac Surgical prep: No  
  
SIGNATURE: Krystal Castellanos RN CHECKED BY:  
DATE of SERVICE: 2011  
TIME of SERVICE: 2:23 PM  
  
  
   
   
                                Anemia of chronic disease 2011  
   
                                Intestinal polyp 2011  
   
                                                      
  
  
Overview:Formatting of this note might be different from the original.  
Distal ileum ulcerated polyp.  
   
   
                                Nonspecific abnormal results of liver function s  
tudy 2006  
   
                                Impotence of organic origin 2006  
   
                                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  
                 2021  
  
documented as of this encounter (statuses as of 2022)  
[facility]06- History of Past illness Narrative*   
  
                                Problem         Noted Date      Resolved Date  
   
                                Medicare annual wellness visit, subsequent 2019  
   
                                Encounter for long-term (current) use of medicat  
ions 2018  
   
                                                      
  
  
Overview:Formatting of this note might be different from the original.  
Added automatically from request for surgery [id number]  
   
   
                                History of colonic polyps 2018  
   
                                                      
  
  
Overview:Formatting of this note might be different from the original.  
Added automatically from request for surgery [id number]  
   
   
                                Gastroesophageal reflux disease 2018  
   
                                                      
  
  
Overview:Formatting of this note might be different from the original.  
Added automatically from request for surgery [id number]  
   
   
                                Acute pulmonary edema 10/05/2011      10/06/2011  
   
                                                      
  
  
Overview:Formatting of this note might be different from the original.  
10/5/2011  
cardiogenic and noncardiogenic factors  
  
   
   
                                Atelectasis     10/05/2011      10/06/2011  
   
                                Hypotension     10/04/2011      10/06/2011  
   
                                                      
  
  
Overview:Formatting of this note might be different from the original.  
10/4/2011  
goal map 65-80  
   
   
                                Stress hyperglycemia 10/04/2011      10/06/2011  
   
                                                      
  
  
Overview:Formatting of this note might be different from the original.  
10/4/2011  
Perioperative insulin resistance and exacerbation of hyperglycemia.  
Control blood glucose with titration of insulin infusion  
  
10/5/2011  
adequate control  
goal FBG<180  
  
   
   
                                Post-op pain    10/04/2011      10/06/2011  
   
                                Mechanically assisted ventilation 10/04/2011      
  10/05/2011  
   
                                                      
  
  
Overview:Formatting of this note might be different from the original.  
10/4/2011  
optimize MV settings, WTE  
current setting:FiO2, 75%, peep 8  
   
   
                                Cardiac insufficiency 10/04/2011      10/05/2011  
   
                                                      
  
  
Overview:Formatting of this note might be different from the original.  
10/4/2011  
RV mild to moderate post pump  
goal CI>2.3  
   
   
                                Hypoxemia       10/04/2011      10/06/2011  
   
                                discharge planning 2011      10/19/2011  
   
                                                      
  
  
Overview:Formatting of this note might be different from the original.  
age 71,  lives in [place], [place]. No DC needs.  
/  
   
   
                                Pre-op testing  2011      10/04/2011  
   
                                                      
  
  
Overview:Formatting of this note is different from the original.  
Images from the original note were not included.  
[facility]  
PRE-OP CHECKLIST  
  
Surgeon: Deangelo Angulo M.D. Informed Consent Completed: No  
STS Score: 1.4%  
CAD: Yes - CAD on Problem List: Yes  
Is intended procedure a CABG: Yes - is a beta blocker ordered? Yes  
  
H & P completed: Yes  
  
PA/LAT: Completed CT: Completed MRI: N/A LE US: N/A  
  
Cath: Yes - reviewed: Yes Echo:Completed EKG: Completed EF %: 61  
  
PI's: N/A Carotid: Completed Mapping: N/A Dental: Completed PFT's: N/A  
  
Basename 11 0729  
WBC 7.18  
HB 13.1  
HCT 41.8  
  
INR 1.0  
CREAT 1.35  
  
UA: Normal  
HCG:N/A  
  
ABO/ABO Confirmed: Yes  
  
Blood ordered: No  
  
SA Swab: Yes - results: Pending  
  
Last Dose of Anticoagulation: vitamins  
  
Op Note: N/A  
  
Pacemaker Check: N/A  
  
Consults: GI 2001  
  
DM: No  
  
Cardiac Surgical prep: No  
  
SIGNATURE: Krystal Castellanos RN CHECKED BY:  
DATE of SERVICE: 2011  
TIME of SERVICE: 2:23 PM  
  
  
   
   
                                Anemia of chronic disease 2011  
   
                                Intestinal polyp 2011  
   
                                                      
  
  
Overview:Formatting of this note might be different from the original.  
Distal ileum ulcerated polyp.  
   
   
                                Nonspecific abnormal results of liver function s  
tudy 2006  
   
                                Impotence of organic origin 2006  
   
                                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  
                 2021  
  
documented as of this encounter (statuses as of 2022)  
[facility]06- History of Past illness Narrative*   
  
                                Problem         Noted Date      Resolved Date  
   
                                Medicare annual wellness visit, subsequent 2019  
   
                                Encounter for long-term (current) use of medicat  
ions 2018  
   
                                                      
  
  
Overview:Formatting of this note might be different from the original.  
Added automatically from request for surgery [id number]  
   
   
                                History of colonic polyps 2018  
   
                                                      
  
  
Overview:Formatting of this note might be different from the original.  
Added automatically from request for surgery [id number]  
   
   
                                Gastroesophageal reflux disease 2018  
   
                                                      
  
  
Overview:Formatting of this note might be different from the original.  
Added automatically from request for surgery [id number]  
   
   
                                Acute pulmonary edema 10/05/2011      10/06/2011  
   
                                                      
  
  
Overview:Formatting of this note might be different from the original.  
10/5/2011  
cardiogenic and noncardiogenic factors  
  
   
   
                                Atelectasis     10/05/2011      10/06/2011  
   
                                Hypotension     10/04/2011      10/06/2011  
   
                                                      
  
  
Overview:Formatting of this note might be different from the original.  
10/4/2011  
goal map 65-80  
   
   
                                Stress hyperglycemia 10/04/2011      10/06/2011  
   
                                                      
  
  
Overview:Formatting of this note might be different from the original.  
10/4/2011  
Perioperative insulin resistance and exacerbation of hyperglycemia.  
Control blood glucose with titration of insulin infusion  
  
10/5/2011  
adequate control  
goal FBG<180  
  
   
   
                                Post-op pain    10/04/2011      10/06/2011  
   
                                Mechanically assisted ventilation 10/04/2011      
  10/05/2011  
   
                                                      
  
  
Overview:Formatting of this note might be different from the original.  
10/4/2011  
optimize MV settings, WTE  
current setting:FiO2, 75%, peep 8  
   
   
                                Cardiac insufficiency 10/04/2011      10/05/2011  
   
                                                      
  
  
Overview:Formatting of this note might be different from the original.  
10/4/2011  
RV mild to moderate post pump  
goal CI>2.3  
   
   
                                Hypoxemia       10/04/2011      10/06/2011  
   
                                discharge planning 2011      10/19/2011  
   
                                                      
  
  
Overview:Formatting of this note might be different from the original.  
age 71,  lives in [place], [place]. No DC needs.  
/  
   
   
                                Pre-op testing  2011      10/04/2011  
   
                                                      
  
  
Overview:Formatting of this note is different from the original.  
Images from the original note were not included.  
[facility]  
PRE-OP CHECKLIST  
  
Surgeon: Deangelo Angulo M.D. Informed Consent Completed: No  
STS Score: 1.4%  
CAD: Yes - CAD on Problem List: Yes  
Is intended procedure a CABG: Yes - is a beta blocker ordered? Yes  
  
H & P completed: Yes  
  
PA/LAT: Completed CT: Completed MRI: N/A LE US: N/A  
  
Cath: Yes - reviewed: Yes Echo:Completed EKG: Completed EF %: 61  
  
PI's: N/A Carotid: Completed Mapping: N/A Dental: Completed PFT's: N/A  
  
Basename 11 0729  
WBC 7.18  
HB 13.1  
HCT 41.8  
  
INR 1.0  
CREAT 1.35  
  
UA: Normal  
HCG:N/A  
  
ABO/ABO Confirmed: Yes  
  
Blood ordered: No  
  
SA Swab: Yes - results: Pending  
  
Last Dose of Anticoagulation: vitamins  
  
Op Note: N/A  
  
Pacemaker Check: N/A  
  
Consults: GI 2001  
  
DM: No  
  
Cardiac Surgical prep: No  
  
SIGNATURE: Krysatl Castellanos RN CHECKED BY:  
DATE of SERVICE: 2011  
TIME of SERVICE: 2:23 PM  
  
  
   
   
                                Anemia of chronic disease 2011  
   
                                Intestinal polyp 2011  
   
                                                      
  
  
Overview:Formatting of this note might be different from the original.  
Distal ileum ulcerated polyp.  
   
   
                                Nonspecific abnormal results of liver function s  
tudy 2006  
   
                                Impotence of organic origin 2006  
   
                                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  
                 2021  
  
documented as of this encounter (statuses as of 2022)  
[facility]06- History of Past illness Narrative*   
  
                                Problem         Noted Date      Resolved Date  
   
                                Medicare annual wellness visit, subsequent 2019  
   
                                Encounter for long-term (current) use of medicat  
ions 2018  
   
                                                      
  
  
Overview:Formatting of this note might be different from the original.  
Added automatically from request for surgery [id number]  
   
   
                                History of colonic polyps 2018  
   
                                                      
  
  
Overview:Formatting of this note might be different from the original.  
Added automatically from request for surgery [id number]  
   
   
                                Gastroesophageal reflux disease 2018  
   
                                                      
  
  
Overview:Formatting of this note might be different from the original.  
Added automatically from request for surgery [id number]  
   
   
                                Acute pulmonary edema 10/05/2011      10/06/2011  
   
                                                      
  
  
Overview:Formatting of this note might be different from the original.  
10/5/2011  
cardiogenic and noncardiogenic factors  
  
   
   
                                Atelectasis     10/05/2011      10/06/2011  
   
                                Hypotension     10/04/2011      10/06/2011  
   
                                                      
  
  
Overview:Formatting of this note might be different from the original.  
10/4/2011  
goal map 65-80  
   
   
                                Stress hyperglycemia 10/04/2011      10/06/2011  
   
                                                      
  
  
Overview:Formatting of this note might be different from the original.  
10/4/2011  
Perioperative insulin resistance and exacerbation of hyperglycemia.  
Control blood glucose with titration of insulin infusion  
  
10/5/2011  
adequate control  
goal FBG<180  
  
   
   
                                Post-op pain    10/04/2011      10/06/2011  
   
                                Mechanically assisted ventilation 10/04/2011      
  10/05/2011  
   
                                                      
  
  
Overview:Formatting of this note might be different from the original.  
10/4/2011  
optimize MV settings, WTE  
current setting:FiO2, 75%, peep 8  
   
   
                                Cardiac insufficiency 10/04/2011      10/05/2011  
   
                                                      
  
  
Overview:Formatting of this note might be different from the original.  
10/4/2011  
RV mild to moderate post pump  
goal CI>2.3  
   
   
                                Hypoxemia       10/04/2011      10/06/2011  
   
                                discharge planning 2011      10/19/2011  
   
                                                      
  
  
Overview:Formatting of this note might be different from the original.  
age 71,  lives in [place], [place]. No DC needs.  
/  
   
   
                                Pre-op testing  2011      10/04/2011  
   
                                                      
  
  
Overview:Formatting of this note is different from the original.  
Images from the original note were not included.  
[facility]  
PRE-OP CHECKLIST  
  
Surgeon: Deangelo Angulo M.D. Informed Consent Completed: No  
STS Score: 1.4%  
CAD: Yes - CAD on Problem List: Yes  
Is intended procedure a CABG: Yes - is a beta blocker ordered? Yes  
  
H & P completed: Yes  
  
PA/LAT: Completed CT: Completed MRI: N/A LE US: N/A  
  
Cath: Yes - reviewed: Yes Echo:Completed EKG: Completed EF %: 61  
  
PI's: N/A Carotid: Completed Mapping: N/A Dental: Completed PFT's: N/A  
  
Basename 11 0729  
WBC 7.18  
HB 13.1  
HCT 41.8  
  
INR 1.0  
CREAT 1.35  
  
UA: Normal  
HCG:N/A  
  
ABO/ABO Confirmed: Yes  
  
Blood ordered: No  
  
SA Swab: Yes - results: Pending  
  
Last Dose of Anticoagulation: vitamins  
  
Op Note: N/A  
  
Pacemaker Check: N/A  
  
Consults: GI 2001  
  
DM: No  
  
Cardiac Surgical prep: No  
  
SIGNATURE: Krystal Castellanos RN CHECKED BY:  
DATE of SERVICE: 2011  
TIME of SERVICE: 2:23 PM  
  
  
   
   
                                Anemia of chronic disease 2011  
   
                                Intestinal polyp 2011  
   
                                                      
  
  
Overview:Formatting of this note might be different from the original.  
Distal ileum ulcerated polyp.  
   
   
                                Nonspecific abnormal results of liver function s  
pepe 2006  
   
                                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  
2015  
   
                                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  
                 2021  
  
documented as of this encounter (statuses as of 2022)  
[facility]06- History of Past illness Narrative*   
  
                                Problem         Noted Date      Resolved Date  
   
                                Medicare annual wellness visit, subsequent 2019  
   
                                Encounter for long-term (current) use of medicat  
ions 2018  
   
                                                      
  
  
Overview:Formatting of this note might be different from the original.  
Added automatically from request for surgery [id number]  
   
   
                                History of colonic polyps 2018  
   
                                                      
  
  
Overview:Formatting of this note might be different from the original.  
Added automatically from request for surgery [id number]  
   
   
                                Gastroesophageal reflux disease 2018  
   
                                                      
  
  
Overview:Formatting of this note might be different from the original.  
Added automatically from request for surgery [id number]  
   
   
                                Acute pulmonary edema 10/05/2011      10/06/2011  
   
                                                      
  
  
Overview:Formatting of this note might be different from the original.  
10/5/2011  
cardiogenic and noncardiogenic factors  
  
   
   
                                Atelectasis     10/05/2011      10/06/2011  
   
                                Hypotension     10/04/2011      10/06/2011  
   
                                                      
  
  
Overview:Formatting of this note might be different from the original.  
10/4/2011  
goal map 65-80  
   
   
                                Stress hyperglycemia 10/04/2011      10/06/2011  
   
                                                      
  
  
Overview:Formatting of this note might be different from the original.  
10/4/2011  
Perioperative insulin resistance and exacerbation of hyperglycemia.  
Control blood glucose with titration of insulin infusion  
  
10/5/2011  
adequate control  
goal FBG<180  
  
   
   
                                Post-op pain    10/04/2011      10/06/2011  
   
                                Mechanically assisted ventilation 10/04/2011      
  10/05/2011  
   
                                                      
  
  
Overview:Formatting of this note might be different from the original.  
10/4/2011  
optimize MV settings, WTE  
current setting:FiO2, 75%, peep 8  
   
   
                                Cardiac insufficiency 10/04/2011      10/05/2011  
   
                                                      
  
  
Overview:Formatting of this note might be different from the original.  
10/4/2011  
RV mild to moderate post pump  
goal CI>2.3  
   
   
                                Hypoxemia       10/04/2011      10/06/2011  
   
                                discharge planning 2011      10/19/2011  
   
                                                      
  
  
Overview:Formatting of this note might be different from the original.  
age 71,  lives in [place], [place]. No DC needs.  
/  
   
   
                                Pre-op testing  2011      10/04/2011  
   
                                                      
  
  
Overview:Formatting of this note is different from the original.  
Images from the original note were not included.  
[facility]  
PRE-OP CHECKLIST  
  
Surgeon: Deangelo Angulo M.D. Informed Consent Completed: No  
STS Score: 1.4%  
CAD: Yes - CAD on Problem List: Yes  
Is intended procedure a CABG: Yes - is a beta blocker ordered? Yes  
  
H & P completed: Yes  
  
PA/LAT: Completed CT: Completed MRI: N/A LE US: N/A  
  
Cath: Yes - reviewed: Yes Echo:Completed EKG: Completed EF %: 61  
  
PI's: N/A Carotid: Completed Mapping: N/A Dental: Completed PFT's: N/A  
  
Basename 11 0729  
WBC 7.18  
HB 13.1  
HCT 41.8  
  
INR 1.0  
CREAT 1.35  
  
UA: Normal  
HCG:N/A  
  
ABO/ABO Confirmed: Yes  
  
Blood ordered: No  
  
SA Swab: Yes - results: Pending  
  
Last Dose of Anticoagulation: vitamins  
  
Op Note: N/A  
  
Pacemaker Check: N/A  
  
Consults: GI 2001  
  
DM: No  
  
Cardiac Surgical prep: No  
  
SIGNATURE: Krystal Castellanos RN CHECKED BY:  
DATE of SERVICE: 2011  
TIME of SERVICE: 2:23 PM  
  
  
   
   
                                Anemia of chronic disease 2011  
   
                                Intestinal polyp 2011  
   
                                                      
  
  
Overview:Formatting of this note might be different from the original.  
Distal ileum ulcerated polyp.  
   
   
                                Nonspecific abnormal results of liver function s  
nialldy 2006  
   
                                Impotence of organic origin 2006  
   
                                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  
                 2021  
  
documented as of this encounter (statuses as of 2022)  
[facility]06- History of Past illness Narrative*   
  
                                Problem         Noted Date      Resolved Date  
   
                                Medicare annual wellness visit, subsequent 2019  
   
                                Encounter for long-term (current) use of medicat  
ions 2018  
   
                                                      
  
  
Overview:Formatting of this note might be different from the original.  
Added automatically from request for surgery [id number]  
   
   
                                History of colonic polyps 2018  
   
                                                      
  
  
Overview:Formatting of this note might be different from the original.  
Added automatically from request for surgery [id number]  
   
   
                                Gastroesophageal reflux disease 2018  
   
                                                      
  
  
Overview:Formatting of this note might be different from the original.  
Added automatically from request for surgery [id number]  
   
   
                                Acute pulmonary edema 10/05/2011      10/06/2011  
   
                                                      
  
  
Overview:Formatting of this note might be different from the original.  
10/5/2011  
cardiogenic and noncardiogenic factors  
  
   
   
                                Atelectasis     10/05/2011      10/06/2011  
   
                                Hypotension     10/04/2011      10/06/2011  
   
                                                      
  
  
Overview:Formatting of this note might be different from the original.  
10/4/2011  
goal map 65-80  
   
   
                                Stress hyperglycemia 10/04/2011      10/06/2011  
   
                                                      
  
  
Overview:Formatting of this note might be different from the original.  
10/4/2011  
Perioperative insulin resistance and exacerbation of hyperglycemia.  
Control blood glucose with titration of insulin infusion  
  
10/5/2011  
adequate control  
goal FBG<180  
  
   
   
                                Post-op pain    10/04/2011      10/06/2011  
   
                                Mechanically assisted ventilation 10/04/2011      
  10/05/2011  
   
                                                      
  
  
Overview:Formatting of this note might be different from the original.  
10/4/2011  
optimize MV settings, WTE  
current setting:FiO2, 75%, peep 8  
   
   
                                Cardiac insufficiency 10/04/2011      10/05/2011  
   
                                                      
  
  
Overview:Formatting of this note might be different from the original.  
10/4/2011  
RV mild to moderate post pump  
goal CI>2.3  
   
   
                                Hypoxemia       10/04/2011      10/06/2011  
   
                                discharge planning 2011      10/19/2011  
   
                                                      
  
  
Overview:Formatting of this note might be different from the original.  
age 71,  lives in [place], [place]. No DC needs.  
/  
   
   
                                Pre-op testing  2011      10/04/2011  
   
                                                      
  
  
Overview:Formatting of this note is different from the original.  
Images from the original note were not included.  
[facility]  
PRE-OP CHECKLIST  
  
Surgeon: Deangelo Angulo M.D. Informed Consent Completed: No  
STS Score: 1.4%  
CAD: Yes - CAD on Problem List: Yes  
Is intended procedure a CABG: Yes - is a beta blocker ordered? Yes  
  
H & P completed: Yes  
  
PA/LAT: Completed CT: Completed MRI: N/A LE US: N/A  
  
Cath: Yes - reviewed: Yes Echo:Completed EKG: Completed EF %: 61  
  
PI's: N/A Carotid: Completed Mapping: N/A Dental: Completed PFT's: N/A  
  
Basename 11 0729  
WBC 7.18  
HB 13.1  
HCT 41.8  
  
INR 1.0  
CREAT 1.35  
  
UA: Normal  
HCG:N/A  
  
ABO/ABO Confirmed: Yes  
  
Blood ordered: No  
  
SA Swab: Yes - results: Pending  
  
Last Dose of Anticoagulation: vitamins  
  
Op Note: N/A  
  
Pacemaker Check: N/A  
  
Consults: GI 2001  
  
DM: No  
  
Cardiac Surgical prep: No  
  
SIGNATURE: Krystal Castellanos RN CHECKED BY:  
DATE of SERVICE: 2011  
TIME of SERVICE: 2:23 PM  
  
  
   
   
                                Anemia of chronic disease 2011  
   
                                Intestinal polyp 2011  
   
                                                      
  
  
Overview:Formatting of this note might be different from the original.  
Distal ileum ulcerated polyp.  
   
   
                                Nonspecific abnormal results of liver function s  
tudy 2006  
   
                                Impotence of organic origin 2006  
   
                                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  
                 2021  
  
documented as of this encounter (statuses as of 2022)  
[facility]06- History of Past illness Narrative*   
  
                                Problem         Noted Date      Resolved Date  
   
                                Medicare annual wellness visit, subsequent 2019  
   
                                Encounter for long-term (current) use of medicat  
ions 2018  
   
                                                      
  
  
Overview:Formatting of this note might be different from the original.  
Added automatically from request for surgery [id number]  
   
   
                                History of colonic polyps 2018  
   
                                                      
  
  
Overview:Formatting of this note might be different from the original.  
Added automatically from request for surgery [id number]  
   
   
                                Gastroesophageal reflux disease 2018  
   
                                                      
  
  
Overview:Formatting of this note might be different from the original.  
Added automatically from request for surgery [id number]  
   
   
                                Acute pulmonary edema 10/05/2011      10/06/2011  
   
                                                      
  
  
Overview:Formatting of this note might be different from the original.  
10/5/2011  
cardiogenic and noncardiogenic factors  
  
   
   
                                Atelectasis     10/05/2011      10/06/2011  
   
                                Hypotension     10/04/2011      10/06/2011  
   
                                                      
  
  
Overview:Formatting of this note might be different from the original.  
10/4/2011  
goal map 65-80  
   
   
                                Stress hyperglycemia 10/04/2011      10/06/2011  
   
                                                      
  
  
Overview:Formatting of this note might be different from the original.  
10/4/2011  
Perioperative insulin resistance and exacerbation of hyperglycemia.  
Control blood glucose with titration of insulin infusion  
  
10/5/2011  
adequate control  
goal FBG<180  
  
   
   
                                Post-op pain    10/04/2011      10/06/2011  
   
                                Mechanically assisted ventilation 10/04/2011      
  10/05/2011  
   
                                                      
  
  
Overview:Formatting of this note might be different from the original.  
10/4/2011  
optimize MV settings, WTE  
current setting:FiO2, 75%, peep 8  
   
   
                                Cardiac insufficiency 10/04/2011      10/05/2011  
   
                                                      
  
  
Overview:Formatting of this note might be different from the original.  
10/4/2011  
RV mild to moderate post pump  
goal CI>2.3  
   
   
                                Hypoxemia       10/04/2011      10/06/2011  
   
                                discharge planning 2011      10/19/2011  
   
                                                      
  
  
Overview:Formatting of this note might be different from the original.  
age 71,  lives in [place], [place]. No DC needs.  
/  
   
   
                                Pre-op testing  2011      10/04/2011  
   
                                                      
  
  
Overview:Formatting of this note is different from the original.  
Images from the original note were not included.  
[facility]  
PRE-OP CHECKLIST  
  
Surgeon: Deangelo Angulo M.D. Informed Consent Completed: No  
STS Score: 1.4%  
CAD: Yes - CAD on Problem List: Yes  
Is intended procedure a CABG: Yes - is a beta blocker ordered? Yes  
  
H & P completed: Yes  
  
PA/LAT: Completed CT: Completed MRI: N/A LE US: N/A  
  
Cath: Yes - reviewed: Yes Echo:Completed EKG: Completed EF %: 61  
  
PI's: N/A Carotid: Completed Mapping: N/A Dental: Completed PFT's: N/A  
  
Basename 11 0729  
WBC 7.18  
HB 13.1  
HCT 41.8  
  
INR 1.0  
CREAT 1.35  
  
UA: Normal  
HCG:N/A  
  
ABO/ABO Confirmed: Yes  
  
Blood ordered: No  
  
SA Swab: Yes - results: Pending  
  
Last Dose of Anticoagulation: vitamins  
  
Op Note: N/A  
  
Pacemaker Check: N/A  
  
Consults: GI 2001  
  
DM: No  
  
Cardiac Surgical prep: No  
  
SIGNATURE: Krystal Castellanos RN CHECKED BY:  
DATE of SERVICE: 2011  
TIME of SERVICE: 2:23 PM  
  
  
   
   
                                Anemia of chronic disease 2011  
   
                                Intestinal polyp 2011  
   
                                                      
  
  
Overview:Formatting of this note might be different from the original.  
Distal ileum ulcerated polyp.  
   
   
                                Nonspecific abnormal results of liver function s  
pepe 2006  
   
                                Impotence of organic origin 2006  
   
                                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  
                 2021  
  
documented as of this encounter (statuses as of 2022)  
[facility]06- History of Past illness Narrative*   
  
                                Problem         Noted Date      Resolved Date  
   
                                Medicare annual wellness visit, subsequent 2019  
   
                                Encounter for long-term (current) use of medicat  
ions 2018  
   
                                                      
  
  
Overview:Formatting of this note might be different from the original.  
Added automatically from request for surgery [id number]  
   
   
                                History of colonic polyps 2018  
   
                                                      
  
  
Overview:Formatting of this note might be different from the original.  
Added automatically from request for surgery [id number]  
   
   
                                Gastroesophageal reflux disease 2018  
   
                                                      
  
  
Overview:Formatting of this note might be different from the original.  
Added automatically from request for surgery [id number]  
   
   
                                Acute pulmonary edema 10/05/2011      10/06/2011  
   
                                                      
  
  
Overview:Formatting of this note might be different from the original.  
10/5/2011  
cardiogenic and noncardiogenic factors  
  
   
   
                                Atelectasis     10/05/2011      10/06/2011  
   
                                Hypotension     10/04/2011      10/06/2011  
   
                                                      
  
  
Overview:Formatting of this note might be different from the original.  
10/4/2011  
goal map 65-80  
   
   
                                Stress hyperglycemia 10/04/2011      10/06/2011  
   
                                                      
  
  
Overview:Formatting of this note might be different from the original.  
10/4/2011  
Perioperative insulin resistance and exacerbation of hyperglycemia.  
Control blood glucose with titration of insulin infusion  
  
10/5/2011  
adequate control  
goal FBG<180  
  
   
   
                                Post-op pain    10/04/2011      10/06/2011  
   
                                Mechanically assisted ventilation 10/04/2011      
  10/05/2011  
   
                                                      
  
  
Overview:Formatting of this note might be different from the original.  
10/4/2011  
optimize MV settings, WTE  
current setting:FiO2, 75%, peep 8  
   
   
                                Cardiac insufficiency 10/04/2011      10/05/2011  
   
                                                      
  
  
Overview:Formatting of this note might be different from the original.  
10/4/2011  
RV mild to moderate post pump  
goal CI>2.3  
   
   
                                Hypoxemia       10/04/2011      10/06/2011  
   
                                discharge planning 2011      10/19/2011  
   
                                                      
  
  
Overview:Formatting of this note might be different from the original.  
age 71,  lives in [place], [place]. No DC needs.  
/  
   
   
                                Pre-op testing  2011      10/04/2011  
   
                                                      
  
  
Overview:Formatting of this note is different from the original.  
Images from the original note were not included.  
[facility]  
PRE-OP CHECKLIST  
  
Surgeon: Deangelo Angulo M.D. Informed Consent Completed: No  
STS Score: 1.4%  
CAD: Yes - CAD on Problem List: Yes  
Is intended procedure a CABG: Yes - is a beta blocker ordered? Yes  
  
H & P completed: Yes  
  
PA/LAT: Completed CT: Completed MRI: N/A LE US: N/A  
  
Cath: Yes - reviewed: Yes Echo:Completed EKG: Completed EF %: 61  
  
PI's: N/A Carotid: Completed Mapping: N/A Dental: Completed PFT's: N/A  
  
Basename 11 0729  
WBC 7.18  
HB 13.1  
HCT 41.8  
  
INR 1.0  
CREAT 1.35  
  
UA: Normal  
HCG:N/A  
  
ABO/ABO Confirmed: Yes  
  
Blood ordered: No  
  
SA Swab: Yes - results: Pending  
  
Last Dose of Anticoagulation: vitamins  
  
Op Note: N/A  
  
Pacemaker Check: N/A  
  
Consults: GI 2001  
  
DM: No  
  
Cardiac Surgical prep: No  
  
SIGNATURE: Krystal Castellanos RN CHECKED BY:  
DATE of SERVICE: 2011  
TIME of SERVICE: 2:23 PM  
  
  
   
   
                                Anemia of chronic disease 2011  
   
                                Intestinal polyp 2011  
   
                                                      
  
  
Overview:Formatting of this note might be different from the original.  
Distal ileum ulcerated polyp.  
   
   
                                Nonspecific abnormal results of liver function lav[place] 2006  
   
                                Impotence of organic origin 2006  
   
                                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  
                 2021  
  
documented as of this encounter (statuses as of 10/02/2022)  
[facility]06- History of Past illness Narrative*   
  
                                Problem         Noted Date      Resolved Date  
   
                                Medicare annual wellness visit, subsequent 2019  
   
                                Encounter for long-term (current) use of medicat  
ions 2018  
   
                                                      
  
  
Overview:Formatting of this note might be different from the original.  
Added automatically from request for surgery [id number]  
   
   
                                History of colonic polyps 2018  
   
                                                      
  
  
Overview:Formatting of this note might be different from the original.  
Added automatically from request for surgery [id number]  
   
   
                                Gastroesophageal reflux disease 2018  
   
                                                      
  
  
Overview:Formatting of this note might be different from the original.  
Added automatically from request for surgery [id number]  
   
   
                                Acute pulmonary edema 10/05/2011      10/06/2011  
   
                                                      
  
  
Overview:Formatting of this note might be different from the original.  
10/5/2011  
cardiogenic and noncardiogenic factors  
  
   
   
                                Atelectasis     10/05/2011      10/06/2011  
   
                                Hypotension     10/04/2011      10/06/2011  
   
                                                      
  
  
Overview:Formatting of this note might be different from the original.  
10/4/2011  
goal map 65-80  
   
   
                                Stress hyperglycemia 10/04/2011      10/06/2011  
   
                                                      
  
  
Overview:Formatting of this note might be different from the original.  
10/4/2011  
Perioperative insulin resistance and exacerbation of hyperglycemia.  
Control blood glucose with titration of insulin infusion  
  
10/5/2011  
adequate control  
goal FBG<180  
  
   
   
                                Post-op pain    10/04/2011      10/06/2011  
   
                                Mechanically assisted ventilation 10/04/2011      
  10/05/2011  
   
                                                      
  
  
Overview:Formatting of this note might be different from the original.  
10/4/2011  
optimize MV settings, WTE  
current setting:FiO2, 75%, peep 8  
   
   
                                Cardiac insufficiency 10/04/2011      10/05/2011  
   
                                                      
  
  
Overview:Formatting of this note might be different from the original.  
10/4/2011  
RV mild to moderate post pump  
goal CI>2.3  
   
   
                                Hypoxemia       10/04/2011      10/06/2011  
   
                                discharge planning 2011      10/19/2011  
   
                                                      
  
  
Overview:Formatting of this note might be different from the original.  
age 71,  lives in [place], [place]. No DC needs.  
/  
   
   
                                Pre-op testing  2011      10/04/2011  
   
                                                      
  
  
Overview:Formatting of this note is different from the original.  
Images from the original note were not included.  
[facility]  
PRE-OP CHECKLIST  
  
Surgeon: Deangelo Angulo M.D. Informed Consent Completed: No  
STS Score: 1.4%  
CAD: Yes - CAD on Problem List: Yes  
Is intended procedure a CABG: Yes - is a beta blocker ordered? Yes  
  
H & P completed: Yes  
  
PA/LAT: Completed CT: Completed MRI: N/A LE US: N/A  
  
Cath: Yes - reviewed: Yes Echo:Completed EKG: Completed EF %: 61  
  
PI's: N/A Carotid: Completed Mapping: N/A Dental: Completed PFT's: N/A  
  
Basename 11 0729  
WBC 7.18  
HB 13.1  
HCT 41.8  
  
INR 1.0  
CREAT 1.35  
  
UA: Normal  
HCG:N/A  
  
ABO/ABO Confirmed: Yes  
  
Blood ordered: No  
  
SA Swab: Yes - results: Pending  
  
Last Dose of Anticoagulation: vitamins  
  
Op Note: N/A  
  
Pacemaker Check: N/A  
  
Consults: GI 2001  
  
DM: No  
  
Cardiac Surgical prep: No  
  
SIGNATURE: Krystal Castellanos RN CHECKED BY:  
DATE of SERVICE: 2011  
TIME of SERVICE: 2:23 PM  
  
  
   
   
                                Anemia of chronic disease 2011  
   
                                Intestinal polyp 2011  
   
                                                      
  
  
Overview:Formatting of this note might be different from the original.  
Distal ileum ulcerated polyp.  
   
   
                                Nonspecific abnormal results of liver function s  
nialldy 2006  
   
                                Impotence of organic origin 2006  
   
                                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  
                 2021  
  
documented as of this encounter (statuses as of 10/03/2022)  
[facility]06- History of Past illness Narrative*   
  
                                Problem         Noted Date      Resolved Date  
   
                                Medicare annual wellness visit, subsequent 2019  
   
                                Encounter for long-term (current) use of medicat  
ions 2018  
   
                                                      
  
  
Overview:Formatting of this note might be different from the original.  
Added automatically from request for surgery [id number]  
   
   
                                History of colonic polyps 2018  
   
                                                      
  
  
Overview:Formatting of this note might be different from the original.  
Added automatically from request for surgery [id number]  
   
   
                                Gastroesophageal reflux disease 2018  
   
                                                      
  
  
Overview:Formatting of this note might be different from the original.  
Added automatically from request for surgery [id number]  
   
   
                                Acute pulmonary edema 10/05/2011      10/06/2011  
   
                                                      
  
  
Overview:Formatting of this note might be different from the original.  
10/5/2011  
cardiogenic and noncardiogenic factors  
  
   
   
                                Atelectasis     10/05/2011      10/06/2011  
   
                                Hypotension     10/04/2011      10/06/2011  
   
                                                      
  
  
Overview:Formatting of this note might be different from the original.  
10/4/2011  
goal map 65-80  
   
   
                                Stress hyperglycemia 10/04/2011      10/06/2011  
   
                                                      
  
  
Overview:Formatting of this note might be different from the original.  
10/4/2011  
Perioperative insulin resistance and exacerbation of hyperglycemia.  
Control blood glucose with titration of insulin infusion  
  
10/5/2011  
adequate control  
goal FBG<180  
  
   
   
                                Post-op pain    10/04/2011      10/06/2011  
   
                                Mechanically assisted ventilation 10/04/2011      
  10/05/2011  
   
                                                      
  
  
Overview:Formatting of this note might be different from the original.  
10/4/2011  
optimize MV settings, WTE  
current setting:FiO2, 75%, peep 8  
   
   
                                Cardiac insufficiency 10/04/2011      10/05/2011  
   
                                                      
  
  
Overview:Formatting of this note might be different from the original.  
10/4/2011  
RV mild to moderate post pump  
goal CI>2.3  
   
   
                                Hypoxemia       10/04/2011      10/06/2011  
   
                                discharge planning 2011      10/19/2011  
   
                                                      
  
  
Overview:Formatting of this note might be different from the original.  
age 71,  lives in [place], [place]. No DC needs.  
/  
   
   
                                Pre-op testing  2011      10/04/2011  
   
                                                      
  
  
Overview:Formatting of this note is different from the original.  
Images from the original note were not included.  
[facility]  
PRE-OP CHECKLIST  
  
Surgeon: Deangelo Angulo M.D. Informed Consent Completed: No  
STS Score: 1.4%  
CAD: Yes - CAD on Problem List: Yes  
Is intended procedure a CABG: Yes - is a beta blocker ordered? Yes  
  
H & P completed: Yes  
  
PA/LAT: Completed CT: Completed MRI: N/A LE US: N/A  
  
Cath: Yes - reviewed: Yes Echo:Completed EKG: Completed EF %: 61  
  
PI's: N/A Carotid: Completed Mapping: N/A Dental: Completed PFT's: N/A  
  
Basename 11 0729  
WBC 7.18  
HB 13.1  
HCT 41.8  
  
INR 1.0  
CREAT 1.35  
  
UA: Normal  
HCG:N/A  
  
ABO/ABO Confirmed: Yes  
  
Blood ordered: No  
  
SA Swab: Yes - results: Pending  
  
Last Dose of Anticoagulation: vitamins  
  
Op Note: N/A  
  
Pacemaker Check: N/A  
  
Consults: GI 2001  
  
DM: No  
  
Cardiac Surgical prep: No  
  
SIGNATURE: Krystal Castellanos RN CHECKED BY:  
DATE of SERVICE: 2011  
TIME of SERVICE: 2:23 PM  
  
  
   
   
                                Anemia of chronic disease 2011  
   
                                Intestinal polyp 2011  
   
                                                      
  
  
Overview:Formatting of this note might be different from the original.  
Distal ileum ulcerated polyp.  
   
   
                                Nonspecific abnormal results of liver function s  
tudy 2006  
   
                                Impotence of organic origin 2006  
   
                                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  
                 2021  
  
documented as of this encounter (statuses as of 10/04/2022)  
[facility]06- History of Past illness Narrative*   
  
                                Problem         Noted Date      Resolved Date  
   
                                Medicare annual wellness visit, subsequent 2019  
   
                                Encounter for long-term (current) use of medicat  
ions 2018  
   
                                                      
  
  
Overview:Formatting of this note might be different from the original.  
Added automatically from request for surgery [id number]  
   
   
                                History of colonic polyps 2018  
   
                                                      
  
  
Overview:Formatting of this note might be different from the original.  
Added automatically from request for surgery [id number]  
   
   
                                Gastroesophageal reflux disease 2018  
   
                                                      
  
  
Overview:Formatting of this note might be different from the original.  
Added automatically from request for surgery [id number]  
   
   
                                Acute pulmonary edema 10/05/2011      10/06/2011  
   
                                                      
  
  
Overview:Formatting of this note might be different from the original.  
10/5/2011  
cardiogenic and noncardiogenic factors  
  
   
   
                                Atelectasis     10/05/2011      10/06/2011  
   
                                Hypotension     10/04/2011      10/06/2011  
   
                                                      
  
  
Overview:Formatting of this note might be different from the original.  
10/4/2011  
goal map 65-80  
   
   
                                Stress hyperglycemia 10/04/2011      10/06/2011  
   
                                                      
  
  
Overview:Formatting of this note might be different from the original.  
10/4/2011  
Perioperative insulin resistance and exacerbation of hyperglycemia.  
Control blood glucose with titration of insulin infusion  
  
10/5/2011  
adequate control  
goal FBG<180  
  
   
   
                                Post-op pain    10/04/2011      10/06/2011  
   
                                Mechanically assisted ventilation 10/04/2011      
  10/05/2011  
   
                                                      
  
  
Overview:Formatting of this note might be different from the original.  
10/4/2011  
optimize MV settings, WTE  
current setting:FiO2, 75%, peep 8  
   
   
                                Cardiac insufficiency 10/04/2011      10/05/2011  
   
                                                      
  
  
Overview:Formatting of this note might be different from the original.  
10/4/2011  
RV mild to moderate post pump  
goal CI>2.3  
   
   
                                Hypoxemia       10/04/2011      10/06/2011  
   
                                discharge planning 2011      10/19/2011  
   
                                                      
  
  
Overview:Formatting of this note might be different from the original.  
age 71,  lives in [place], [place]. No DC needs.  
/  
   
   
                                Pre-op testing  2011      10/04/2011  
   
                                                      
  
  
Overview:Formatting of this note is different from the original.  
Images from the original note were not included.  
[facility]  
PRE-OP CHECKLIST  
  
Surgeon: Deangelo Angulo M.D. Informed Consent Completed: No  
STS Score: 1.4%  
CAD: Yes - CAD on Problem List: Yes  
Is intended procedure a CABG: Yes - is a beta blocker ordered? Yes  
  
H & P completed: Yes  
  
PA/LAT: Completed CT: Completed MRI: N/A LE US: N/A  
  
Cath: Yes - reviewed: Yes Echo:Completed EKG: Completed EF %: 61  
  
PI's: N/A Carotid: Completed Mapping: N/A Dental: Completed PFT's: N/A  
  
Basename 11 0729  
WBC 7.18  
HB 13.1  
HCT 41.8  
  
INR 1.0  
CREAT 1.35  
  
UA: Normal  
HCG:N/A  
  
ABO/ABO Confirmed: Yes  
  
Blood ordered: No  
  
SA Swab: Yes - results: Pending  
  
Last Dose of Anticoagulation: vitamins  
  
Op Note: N/A  
  
Pacemaker Check: N/A  
  
Consults: GI 2001  
  
DM: No  
  
Cardiac Surgical prep: No  
  
SIGNATURE: Krystal Castellanos RN CHECKED BY:  
DATE of SERVICE: 2011  
TIME of SERVICE: 2:23 PM  
  
  
   
   
                                Anemia of chronic disease 2011  
   
                                Intestinal polyp 2011  
   
                                                      
  
  
Overview:Formatting of this note might be different from the original.  
Distal ileum ulcerated polyp.  
   
   
                                Nonspecific abnormal results of liver function s  
tudy 2006  
   
                                Impotence of organic origin 2006  
   
                                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  
                 2021  
  
documented as of this encounter (statuses as of 10/05/2022)  
[facility]06- History of Past illness Narrative*   
  
                                Problem         Noted Date      Resolved Date  
   
                                Medicare annual wellness visit, subsequent 2019  
   
                                Encounter for long-term (current) use of medicat  
ions 2018  
   
                                                      
  
  
Overview:Formatting of this note might be different from the original.  
Added automatically from request for surgery [id number]  
   
   
                                History of colonic polyps 2018  
   
                                                      
  
  
Overview:Formatting of this note might be different from the original.  
Added automatically from request for surgery [id number]  
   
   
                                Gastroesophageal reflux disease 2018  
   
                                                      
  
  
Overview:Formatting of this note might be different from the original.  
Added automatically from request for surgery [id number]  
   
   
                                Acute pulmonary edema 10/05/2011      10/06/2011  
   
                                                      
  
  
Overview:Formatting of this note might be different from the original.  
10/5/2011  
cardiogenic and noncardiogenic factors  
  
   
   
                                Atelectasis     10/05/2011      10/06/2011  
   
                                Hypotension     10/04/2011      10/06/2011  
   
                                                      
  
  
Overview:Formatting of this note might be different from the original.  
10/4/2011  
goal map 65-80  
   
   
                                Stress hyperglycemia 10/04/2011      10/06/2011  
   
                                                      
  
  
Overview:Formatting of this note might be different from the original.  
10/4/2011  
Perioperative insulin resistance and exacerbation of hyperglycemia.  
Control blood glucose with titration of insulin infusion  
  
10/5/2011  
adequate control  
goal FBG<180  
  
   
   
                                Post-op pain    10/04/2011      10/06/2011  
   
                                Mechanically assisted ventilation 10/04/2011      
  10/05/2011  
   
                                                      
  
  
Overview:Formatting of this note might be different from the original.  
10/4/2011  
optimize MV settings, WTE  
current setting:FiO2, 75%, peep 8  
   
   
                                Cardiac insufficiency 10/04/2011      10/05/2011  
   
                                                      
  
  
Overview:Formatting of this note might be different from the original.  
10/4/2011  
RV mild to moderate post pump  
goal CI>2.3  
   
   
                                Hypoxemia       10/04/2011      10/06/2011  
   
                                discharge planning 2011      10/19/2011  
   
                                                      
  
  
Overview:Formatting of this note might be different from the original.  
age 71,  lives in [place], [place]. No DC needs.  
/  
   
   
                                Pre-op testing  2011      10/04/2011  
   
                                                      
  
  
Overview:Formatting of this note is different from the original.  
Images from the original note were not included.  
[facility]  
PRE-OP CHECKLIST  
  
Surgeon: Deangelo Angulo M.D. Informed Consent Completed: No  
STS Score: 1.4%  
CAD: Yes - CAD on Problem List: Yes  
Is intended procedure a CABG: Yes - is a beta blocker ordered? Yes  
  
H & P completed: Yes  
  
PA/LAT: Completed CT: Completed MRI: N/A LE US: N/A  
  
Cath: Yes - reviewed: Yes Echo:Completed EKG: Completed EF %: 61  
  
PI's: N/A Carotid: Completed Mapping: N/A Dental: Completed PFT's: N/A  
  
Basename 11 0729  
WBC 7.18  
HB 13.1  
HCT 41.8  
  
INR 1.0  
CREAT 1.35  
  
UA: Normal  
HCG:N/A  
  
ABO/ABO Confirmed: Yes  
  
Blood ordered: No  
  
SA Swab: Yes - results: Pending  
  
Last Dose of Anticoagulation: vitamins  
  
Op Note: N/A  
  
Pacemaker Check: N/A  
  
Consults: GI 2001  
  
DM: No  
  
Cardiac Surgical prep: No  
  
SIGNATURE: Krystal Castellanos RN CHECKED BY:  
DATE of SERVICE: 2011  
TIME of SERVICE: 2:23 PM  
  
  
   
   
                                Anemia of chronic disease 2011  
   
                                Intestinal polyp 2011  
   
                                                      
  
  
Overview:Formatting of this note might be different from the original.  
Distal ileum ulcerated polyp.  
   
   
                                Nonspecific abnormal results of liver function s  
nialldy 2006  
   
                                Impotence of organic origin 2006  
   
                                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  
                 2021  
  
documented as of this encounter (statuses as of 10/07/2022)  
[facility]06- History of Past illness Narrative*   
  
                                Problem         Noted Date      Resolved Date  
   
                                Medicare annual wellness visit, subsequent 2019  
   
                                Encounter for long-term (current) use of medicat  
ions 2018  
   
                                                      
  
  
Overview:Formatting of this note might be different from the original.  
Added automatically from request for surgery [id number]  
   
   
                                History of colonic polyps 2018  
   
                                                      
  
  
Overview:Formatting of this note might be different from the original.  
Added automatically from request for surgery [id number]  
   
   
                                Gastroesophageal reflux disease 2018  
   
                                                      
  
  
Overview:Formatting of this note might be different from the original.  
Added automatically from request for surgery [id number]  
   
   
                                Acute pulmonary edema 10/05/2011      10/06/2011  
   
                                                      
  
  
Overview:Formatting of this note might be different from the original.  
10/5/2011  
cardiogenic and noncardiogenic factors  
  
   
   
                                Atelectasis     10/05/2011      10/06/2011  
   
                                Hypotension     10/04/2011      10/06/2011  
   
                                                      
  
  
Overview:Formatting of this note might be different from the original.  
10/4/2011  
goal map 65-80  
   
   
                                Stress hyperglycemia 10/04/2011      10/06/2011  
   
                                                      
  
  
Overview:Formatting of this note might be different from the original.  
10/4/2011  
Perioperative insulin resistance and exacerbation of hyperglycemia.  
Control blood glucose with titration of insulin infusion  
  
10/5/2011  
adequate control  
goal FBG<180  
  
   
   
                                Post-op pain    10/04/2011      10/06/2011  
   
                                Mechanically assisted ventilation 10/04/2011      
  10/05/2011  
   
                                                      
  
  
Overview:Formatting of this note might be different from the original.  
10/4/2011  
optimize MV settings, WTE  
current setting:FiO2, 75%, peep 8  
   
   
                                Cardiac insufficiency 10/04/2011      10/05/2011  
   
                                                      
  
  
Overview:Formatting of this note might be different from the original.  
10/4/2011  
RV mild to moderate post pump  
goal CI>2.3  
   
   
                                Hypoxemia       10/04/2011      10/06/2011  
   
                                discharge planning 2011      10/19/2011  
   
                                                      
  
  
Overview:Formatting of this note might be different from the original.  
age 71,  lives in [place], [place]. No DC needs.  
/  
   
   
                                Pre-op testing  2011      10/04/2011  
   
                                                      
  
  
Overview:Formatting of this note is different from the original.  
Images from the original note were not included.  
[facility]  
PRE-OP CHECKLIST  
  
Surgeon: Deangelo Angulo M.D. Informed Consent Completed: No  
STS Score: 1.4%  
CAD: Yes - CAD on Problem List: Yes  
Is intended procedure a CABG: Yes - is a beta blocker ordered? Yes  
  
H & P completed: Yes  
  
PA/LAT: Completed CT: Completed MRI: N/A LE US: N/A  
  
Cath: Yes - reviewed: Yes Echo:Completed EKG: Completed EF %: 61  
  
PI's: N/A Carotid: Completed Mapping: N/A Dental: Completed PFT's: N/A  
  
Basename 11 0729  
WBC 7.18  
HB 13.1  
HCT 41.8  
  
INR 1.0  
CREAT 1.35  
  
UA: Normal  
HCG:N/A  
  
ABO/ABO Confirmed: Yes  
  
Blood ordered: No  
  
SA Swab: Yes - results: Pending  
  
Last Dose of Anticoagulation: vitamins  
  
Op Note: N/A  
  
Pacemaker Check: N/A  
  
Consults: GI 2001  
  
DM: No  
  
Cardiac Surgical prep: No  
  
SIGNATURE: Krystal Castellanos RN CHECKED BY:  
DATE of SERVICE: 2011  
TIME of SERVICE: 2:23 PM  
  
  
   
   
                                Anemia of chronic disease 2011  
   
                                Intestinal polyp 2011  
   
                                                      
  
  
Overview:Formatting of this note might be different from the original.  
Distal ileum ulcerated polyp.  
   
   
                                Nonspecific abnormal results of liver function s  
tudy 2006  
   
                                Impotence of organic origin 2006  
   
                                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  
                 2021  
  
documented as of this encounter (statuses as of 10/10/2022)  
[facility]06- History of Past illness Narrative*   
  
                                Problem         Noted Date      Resolved Date  
   
                                Medicare annual wellness visit, subsequent 2019  
   
                                Encounter for long-term (current) use of medicat  
ions 2018  
   
                                                      
  
  
Overview:Formatting of this note might be different from the original.  
Added automatically from request for surgery [id number]  
   
   
                                History of colonic polyps 2018  
   
                                                      
  
  
Overview:Formatting of this note might be different from the original.  
Added automatically from request for surgery [id number]  
   
   
                                Gastroesophageal reflux disease 2018  
   
                                                      
  
  
Overview:Formatting of this note might be different from the original.  
Added automatically from request for surgery [id number]  
   
   
                                Acute pulmonary edema 10/05/2011      10/06/2011  
   
                                                      
  
  
Overview:Formatting of this note might be different from the original.  
10/5/2011  
cardiogenic and noncardiogenic factors  
  
   
   
                                Atelectasis     10/05/2011      10/06/2011  
   
                                Hypotension     10/04/2011      10/06/2011  
   
                                                      
  
  
Overview:Formatting of this note might be different from the original.  
10/4/2011  
goal map 65-80  
   
   
                                Stress hyperglycemia 10/04/2011      10/06/2011  
   
                                                      
  
  
Overview:Formatting of this note might be different from the original.  
10/4/2011  
Perioperative insulin resistance and exacerbation of hyperglycemia.  
Control blood glucose with titration of insulin infusion  
  
10/5/2011  
adequate control  
goal FBG<180  
  
   
   
                                Post-op pain    10/04/2011      10/06/2011  
   
                                Mechanically assisted ventilation 10/04/2011      
  10/05/2011  
   
                                                      
  
  
Overview:Formatting of this note might be different from the original.  
10/4/2011  
optimize MV settings, WTE  
current setting:FiO2, 75%, peep 8  
   
   
                                Cardiac insufficiency 10/04/2011      10/05/2011  
   
                                                      
  
  
Overview:Formatting of this note might be different from the original.  
10/4/2011  
RV mild to moderate post pump  
goal CI>2.3  
   
   
                                Hypoxemia       10/04/2011      10/06/2011  
   
                                discharge planning 2011      10/19/2011  
   
                                                      
  
  
Overview:Formatting of this note might be different from the original.  
age 71,  lives in [place], [place]. No DC needs.  
/  
   
   
                                Pre-op testing  2011      10/04/2011  
   
                                                      
  
  
Overview:Formatting of this note is different from the original.  
Images from the original note were not included.  
[facility]  
PRE-OP CHECKLIST  
  
Surgeon: Deangelo Angulo M.D. Informed Consent Completed: No  
STS Score: 1.4%  
CAD: Yes - CAD on Problem List: Yes  
Is intended procedure a CABG: Yes - is a beta blocker ordered? Yes  
  
H & P completed: Yes  
  
PA/LAT: Completed CT: Completed MRI: N/A LE US: N/A  
  
Cath: Yes - reviewed: Yes Echo:Completed EKG: Completed EF %: 61  
  
PI's: N/A Carotid: Completed Mapping: N/A Dental: Completed PFT's: N/A  
  
Basename 11 0729  
WBC 7.18  
HB 13.1  
HCT 41.8  
  
INR 1.0  
CREAT 1.35  
  
UA: Normal  
HCG:N/A  
  
ABO/ABO Confirmed: Yes  
  
Blood ordered: No  
  
SA Swab: Yes - results: Pending  
  
Last Dose of Anticoagulation: vitamins  
  
Op Note: N/A  
  
Pacemaker Check: N/A  
  
Consults: GI 2001  
  
DM: No  
  
Cardiac Surgical prep: No  
  
SIGNATURE: Krystal Castellanos RN CHECKED BY:  
DATE of SERVICE: 2011  
TIME of SERVICE: 2:23 PM  
  
  
   
   
                                Anemia of chronic disease 2011  
   
                                Intestinal polyp 2011  
   
                                                      
  
  
Overview:Formatting of this note might be different from the original.  
Distal ileum ulcerated polyp.  
   
   
                                Nonspecific abnormal results of liver function s  
tudy 2006  
   
                                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  
                 2021  
  
documented as of this encounter (statuses as of 10/10/2022)  
[facility]06- History of Past illness Narrative*   
  
                                Problem         Noted Date      Resolved Date  
   
                                Medicare annual wellness visit, subsequent 2019  
   
                                Encounter for long-term (current) use of medicat  
ions 2018  
   
                                                      
  
  
Overview:Formatting of this note might be different from the original.  
Added automatically from request for surgery [id number]  
   
   
                                History of colonic polyps 2018  
   
                                                      
  
  
Overview:Formatting of this note might be different from the original.  
Added automatically from request for surgery [id number]  
   
   
                                Gastroesophageal reflux disease 2018  
   
                                                      
  
  
Overview:Formatting of this note might be different from the original.  
Added automatically from request for surgery [id number]  
   
   
                                Acute pulmonary edema 10/05/2011      10/06/2011  
   
                                                      
  
  
Overview:Formatting of this note might be different from the original.  
10/5/2011  
cardiogenic and noncardiogenic factors  
  
   
   
                                Atelectasis     10/05/2011      10/06/2011  
   
                                Hypotension     10/04/2011      10/06/2011  
   
                                                      
  
  
Overview:Formatting of this note might be different from the original.  
10/4/2011  
goal map 65-80  
   
   
                                Stress hyperglycemia 10/04/2011      10/06/2011  
   
                                                      
  
  
Overview:Formatting of this note might be different from the original.  
10/4/2011  
Perioperative insulin resistance and exacerbation of hyperglycemia.  
Control blood glucose with titration of insulin infusion  
  
10/5/2011  
adequate control  
goal FBG<180  
  
   
   
                                Post-op pain    10/04/2011      10/06/2011  
   
                                Mechanically assisted ventilation 10/04/2011      
  10/05/2011  
   
                                                      
  
  
Overview:Formatting of this note might be different from the original.  
10/4/2011  
optimize MV settings, WTE  
current setting:FiO2, 75%, peep 8  
   
   
                                Cardiac insufficiency 10/04/2011      10/05/2011  
   
                                                      
  
  
Overview:Formatting of this note might be different from the original.  
10/4/2011  
RV mild to moderate post pump  
goal CI>2.3  
   
   
                                Hypoxemia       10/04/2011      10/06/2011  
   
                                discharge planning 2011      10/19/2011  
   
                                                      
  
  
Overview:Formatting of this note might be different from the original.  
age 71,  lives in [place], [place]. No DC needs.  
/  
   
   
                                Pre-op testing  2011      10/04/2011  
   
                                                      
  
  
Overview:Formatting of this note is different from the original.  
Images from the original note were not included.  
[facility]  
PRE-OP CHECKLIST  
  
Surgeon: Deangelo Angulo M.D. Informed Consent Completed: No  
STS Score: 1.4%  
CAD: Yes - CAD on Problem List: Yes  
Is intended procedure a CABG: Yes - is a beta blocker ordered? Yes  
  
H & P completed: Yes  
  
PA/LAT: Completed CT: Completed MRI: N/A LE US: N/A  
  
Cath: Yes - reviewed: Yes Echo:Completed EKG: Completed EF %: 61  
  
PI's: N/A Carotid: Completed Mapping: N/A Dental: Completed PFT's: N/A  
  
Basename 11 0729  
WBC 7.18  
HB 13.1  
HCT 41.8  
  
INR 1.0  
CREAT 1.35  
  
UA: Normal  
HCG:N/A  
  
ABO/ABO Confirmed: Yes  
  
Blood ordered: No  
  
SA Swab: Yes - results: Pending  
  
Last Dose of Anticoagulation: vitamins  
  
Op Note: N/A  
  
Pacemaker Check: N/A  
  
Consults: GI 2001  
  
DM: No  
  
Cardiac Surgical prep: No  
  
SIGNATURE: Krystal Castellanos RN CHECKED BY:  
DATE of SERVICE: 2011  
TIME of SERVICE: 2:23 PM  
  
  
   
   
                                Anemia of chronic disease 2011  
   
                                Intestinal polyp 2011  
   
                                                      
  
  
Overview:Formatting of this note might be different from the original.  
Distal ileum ulcerated polyp.  
   
   
                                Nonspecific abnormal results of liver function s  
nialldy 2006  
   
                                Impotence of organic origin 2006      09/0  
2015  
   
                                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  
                 2021  
  
documented as of this encounter (statuses as of 10/12/2022)  
[facility]06- History of Past illness Narrative*   
  
                                Problem         Noted Date      Resolved Date  
   
                                Medicare annual wellness visit, subsequent 2019  
   
                                Encounter for long-term (current) use of medicat  
ions 2018  
   
                                                      
  
  
Overview:Formatting of this note might be different from the original.  
Added automatically from request for surgery [id number]  
   
   
                                History of colonic polyps 2018  
   
                                                      
  
  
Overview:Formatting of this note might be different from the original.  
Added automatically from request for surgery [id number]  
   
   
                                Gastroesophageal reflux disease 2018  
   
                                                      
  
  
Overview:Formatting of this note might be different from the original.  
Added automatically from request for surgery [id number]  
   
   
                                Acute pulmonary edema 10/05/2011      10/06/2011  
   
                                                      
  
  
Overview:Formatting of this note might be different from the original.  
10/5/2011  
cardiogenic and noncardiogenic factors  
  
   
   
                                Atelectasis     10/05/2011      10/06/2011  
   
                                Hypotension     10/04/2011      10/06/2011  
   
                                                      
  
  
Overview:Formatting of this note might be different from the original.  
10/4/2011  
goal map 65-80  
   
   
                                Stress hyperglycemia 10/04/2011      10/06/2011  
   
                                                      
  
  
Overview:Formatting of this note might be different from the original.  
10/4/2011  
Perioperative insulin resistance and exacerbation of hyperglycemia.  
Control blood glucose with titration of insulin infusion  
  
10/5/2011  
adequate control  
goal FBG<180  
  
   
   
                                Post-op pain    10/04/2011      10/06/2011  
   
                                Mechanically assisted ventilation 10/04/2011      
  10/05/2011  
   
                                                      
  
  
Overview:Formatting of this note might be different from the original.  
10/4/2011  
optimize MV settings, WTE  
current setting:FiO2, 75%, peep 8  
   
   
                                Cardiac insufficiency 10/04/2011      10/05/2011  
   
                                                      
  
  
Overview:Formatting of this note might be different from the original.  
10/4/2011  
RV mild to moderate post pump  
goal CI>2.3  
   
   
                                Hypoxemia       10/04/2011      10/06/2011  
   
                                discharge planning 2011      10/19/2011  
   
                                                      
  
  
Overview:Formatting of this note might be different from the original.  
age 71,  lives in [place], [place]. No DC needs.  
/  
   
   
                                Pre-op testing  2011      10/04/2011  
   
                                                      
  
  
Overview:Formatting of this note is different from the original.  
Images from the original note were not included.  
[facility]  
PRE-OP CHECKLIST  
  
Surgeon: Deangelo Angulo M.D. Informed Consent Completed: No  
STS Score: 1.4%  
CAD: Yes - CAD on Problem List: Yes  
Is intended procedure a CABG: Yes - is a beta blocker ordered? Yes  
  
H & P completed: Yes  
  
PA/LAT: Completed CT: Completed MRI: N/A LE US: N/A  
  
Cath: Yes - reviewed: Yes Echo:Completed EKG: Completed EF %: 61  
  
PI's: N/A Carotid: Completed Mapping: N/A Dental: Completed PFT's: N/A  
  
Basename 11 0729  
WBC 7.18  
HB 13.1  
HCT 41.8  
  
INR 1.0  
CREAT 1.35  
  
UA: Normal  
HCG:N/A  
  
ABO/ABO Confirmed: Yes  
  
Blood ordered: No  
  
SA Swab: Yes - results: Pending  
  
Last Dose of Anticoagulation: vitamins  
  
Op Note: N/A  
  
Pacemaker Check: N/A  
  
Consults: GI 2001  
  
DM: No  
  
Cardiac Surgical prep: No  
  
SIGNATURE: Krystal Castellanos RN CHECKED BY:  
DATE of SERVICE: 2011  
TIME of SERVICE: 2:23 PM  
  
  
   
   
                                Anemia of chronic disease 2011  
   
                                Intestinal polyp 2011  
   
                                                      
  
  
Overview:Formatting of this note might be different from the original.  
Distal ileum ulcerated polyp.  
   
   
                                Nonspecific abnormal results of liver function s  
nialldy 2006  
   
                                Impotence of organic origin 2006  
   
                                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  
                 2021  
  
documented as of this encounter (statuses as of 10/17/2022)  
[facility]06- History of Past illness Narrative*   
  
                                Problem         Noted Date      Resolved Date  
   
                                Medicare annual wellness visit, subsequent 2019  
   
                                Encounter for long-term (current) use of medicat  
ions 2018  
   
                                                      
  
  
Overview:Formatting of this note might be different from the original.  
Added automatically from request for surgery [id number]  
   
   
                                History of colonic polyps 2018  
   
                                                      
  
  
Overview:Formatting of this note might be different from the original.  
Added automatically from request for surgery [id number]  
   
   
                                Gastroesophageal reflux disease 2018  
   
                                                      
  
  
Overview:Formatting of this note might be different from the original.  
Added automatically from request for surgery [id number]  
   
   
                                Acute pulmonary edema 10/05/2011      10/06/2011  
   
                                                      
  
  
Overview:Formatting of this note might be different from the original.  
10/5/2011  
cardiogenic and noncardiogenic factors  
  
   
   
                                Atelectasis     10/05/2011      10/06/2011  
   
                                Hypotension     10/04/2011      10/06/2011  
   
                                                      
  
  
Overview:Formatting of this note might be different from the original.  
10/4/2011  
goal map 65-80  
   
   
                                Stress hyperglycemia 10/04/2011      10/06/2011  
   
                                                      
  
  
Overview:Formatting of this note might be different from the original.  
10/4/2011  
Perioperative insulin resistance and exacerbation of hyperglycemia.  
Control blood glucose with titration of insulin infusion  
  
10/5/2011  
adequate control  
goal FBG<180  
  
   
   
                                Post-op pain    10/04/2011      10/06/2011  
   
                                Mechanically assisted ventilation 10/04/2011      
  10/05/2011  
   
                                                      
  
  
Overview:Formatting of this note might be different from the original.  
10/4/2011  
optimize MV settings, WTE  
current setting:FiO2, 75%, peep 8  
   
   
                                Cardiac insufficiency 10/04/2011      10/05/2011  
   
                                                      
  
  
Overview:Formatting of this note might be different from the original.  
10/4/2011  
RV mild to moderate post pump  
goal CI>2.3  
   
   
                                Hypoxemia       10/04/2011      10/06/2011  
   
                                discharge planning 2011      10/19/2011  
   
                                                      
  
  
Overview:Formatting of this note might be different from the original.  
age 71,  lives in [place], [place]. No DC needs.  
/  
   
   
                                Pre-op testing  2011      10/04/2011  
   
                                                      
  
  
Overview:Formatting of this note is different from the original.  
Images from the original note were not included.  
[facility]  
PRE-OP CHECKLIST  
  
Surgeon: Deangelo Angulo M.D. Informed Consent Completed: No  
STS Score: 1.4%  
CAD: Yes - CAD on Problem List: Yes  
Is intended procedure a CABG: Yes - is a beta blocker ordered? Yes  
  
H & P completed: Yes  
  
PA/LAT: Completed CT: Completed MRI: N/A LE US: N/A  
  
Cath: Yes - reviewed: Yes Echo:Completed EKG: Completed EF %: 61  
  
PI's: N/A Carotid: Completed Mapping: N/A Dental: Completed PFT's: N/A  
  
Basename 11 0729  
WBC 7.18  
HB 13.1  
HCT 41.8  
  
INR 1.0  
CREAT 1.35  
  
UA: Normal  
HCG:N/A  
  
ABO/ABO Confirmed: Yes  
  
Blood ordered: No  
  
SA Swab: Yes - results: Pending  
  
Last Dose of Anticoagulation: vitamins  
  
Op Note: N/A  
  
Pacemaker Check: N/A  
  
Consults: GI 2001  
  
DM: No  
  
Cardiac Surgical prep: No  
  
SIGNATURE: Krystal Castellanos RN CHECKED BY:  
DATE of SERVICE: 2011  
TIME of SERVICE: 2:23 PM  
  
  
   
   
                                Anemia of chronic disease 2011  
   
                                Intestinal polyp 2011  
   
                                                      
  
  
Overview:Formatting of this note might be different from the original.  
Distal ileum ulcerated polyp.  
   
   
                                Nonspecific abnormal results of liver function s  
tudy 2006  
   
                                Impotence of organic origin 2006  
   
                                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  
                 2021  
  
documented as of this encounter (statuses as of 2022)  
[facility]06- History of Past illness Narrative*   
  
                                Problem         Noted Date      Resolved Date  
   
                                Medicare annual wellness visit, subsequent 2019  
   
                                Encounter for long-term (current) use of medicat  
ions 2018  
   
                                                      
  
  
Overview:Formatting of this note might be different from the original.  
Added automatically from request for surgery [id number]  
   
   
                                History of colonic polyps 2018  
   
                                                      
  
  
Overview:Formatting of this note might be different from the original.  
Added automatically from request for surgery [id number]  
   
   
                                Gastroesophageal reflux disease 2018  
   
                                                      
  
  
Overview:Formatting of this note might be different from the original.  
Added automatically from request for surgery [id number]  
   
   
                                Acute pulmonary edema 10/05/2011      10/06/2011  
   
                                                      
  
  
Overview:Formatting of this note might be different from the original.  
10/5/2011  
cardiogenic and noncardiogenic factors  
  
   
   
                                Atelectasis     10/05/2011      10/06/2011  
   
                                Hypotension     10/04/2011      10/06/2011  
   
                                                      
  
  
Overview:Formatting of this note might be different from the original.  
10/4/2011  
goal map 65-80  
   
   
                                Stress hyperglycemia 10/04/2011      10/06/2011  
   
                                                      
  
  
Overview:Formatting of this note might be different from the original.  
10/4/2011  
Perioperative insulin resistance and exacerbation of hyperglycemia.  
Control blood glucose with titration of insulin infusion  
  
10/5/2011  
adequate control  
goal FBG<180  
  
   
   
                                Post-op pain    10/04/2011      10/06/2011  
   
                                Mechanically assisted ventilation 10/04/2011      
  10/05/2011  
   
                                                      
  
  
Overview:Formatting of this note might be different from the original.  
10/4/2011  
optimize MV settings, WTE  
current setting:FiO2, 75%, peep 8  
   
   
                                Cardiac insufficiency 10/04/2011      10/05/2011  
   
                                                      
  
  
Overview:Formatting of this note might be different from the original.  
10/4/2011  
RV mild to moderate post pump  
goal CI>2.3  
   
   
                                Hypoxemia       10/04/2011      10/06/2011  
   
                                discharge planning 2011      10/19/2011  
   
                                                      
  
  
Overview:Formatting of this note might be different from the original.  
age 71,  lives in [place], [place]. No DC needs.  
/  
   
   
                                Pre-op testing  2011      10/04/2011  
   
                                                      
  
  
Overview:Formatting of this note is different from the original.  
Images from the original note were not included.  
[facility]  
PRE-OP CHECKLIST  
  
Surgeon: Deangelo Angulo M.D. Informed Consent Completed: No  
STS Score: 1.4%  
CAD: Yes - CAD on Problem List: Yes  
Is intended procedure a CABG: Yes - is a beta blocker ordered? Yes  
  
H & P completed: Yes  
  
PA/LAT: Completed CT: Completed MRI: N/A LE US: N/A  
  
Cath: Yes - reviewed: Yes Echo:Completed EKG: Completed EF %: 61  
  
PI's: N/A Carotid: Completed Mapping: N/A Dental: Completed PFT's: N/A  
  
Basename 11 0729  
WBC 7.18  
HB 13.1  
HCT 41.8  
  
INR 1.0  
CREAT 1.35  
  
UA: Normal  
HCG:N/A  
  
ABO/ABO Confirmed: Yes  
  
Blood ordered: No  
  
SA Swab: Yes - results: Pending  
  
Last Dose of Anticoagulation: vitamins  
  
Op Note: N/A  
  
Pacemaker Check: N/A  
  
Consults: GI 2001  
  
DM: No  
  
Cardiac Surgical prep: No  
  
SIGNATURE: Krystal Castellanos RN CHECKED BY:  
DATE of SERVICE: 2011  
TIME of SERVICE: 2:23 PM  
  
  
   
   
                                Anemia of chronic disease 2011  
   
                                Intestinal polyp 2011  
   
                                                      
  
  
Overview:Formatting of this note might be different from the original.  
Distal ileum ulcerated polyp.  
   
   
                                Nonspecific abnormal results of liver function s  
tudy 2006  
   
                                Impotence of organic origin 2006  
   
                                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  
                 2021  
  
documented as of this encounter (statuses as of 2022)  
[facility]06- History of Past illness Narrative*   
  
                                Problem         Noted Date      Resolved Date  
   
                                Medicare annual wellness visit, subsequent 2019  
   
                                Encounter for long-term (current) use of medicat  
ions 2018  
   
                                                      
  
  
Overview:Formatting of this note might be different from the original.  
Added automatically from request for surgery [id number]  
   
   
                                History of colonic polyps 2018  
   
                                                      
  
  
Overview:Formatting of this note might be different from the original.  
Added automatically from request for surgery [id number]  
   
   
                                Gastroesophageal reflux disease 2018  
   
                                                      
  
  
Overview:Formatting of this note might be different from the original.  
Added automatically from request for surgery [id number]  
   
   
                                Acute pulmonary edema 10/05/2011      10/06/2011  
   
                                                      
  
  
Overview:Formatting of this note might be different from the original.  
10/5/2011  
cardiogenic and noncardiogenic factors  
  
   
   
                                Atelectasis     10/05/2011      10/06/2011  
   
                                Hypotension     10/04/2011      10/06/2011  
   
                                                      
  
  
Overview:Formatting of this note might be different from the original.  
10/4/2011  
goal map 65-80  
   
   
                                Stress hyperglycemia 10/04/2011      10/06/2011  
   
                                                      
  
  
Overview:Formatting of this note might be different from the original.  
10/4/2011  
Perioperative insulin resistance and exacerbation of hyperglycemia.  
Control blood glucose with titration of insulin infusion  
  
10/5/2011  
adequate control  
goal FBG<180  
  
   
   
                                Post-op pain    10/04/2011      10/06/2011  
   
                                Mechanically assisted ventilation 10/04/2011      
  10/05/2011  
   
                                                      
  
  
Overview:Formatting of this note might be different from the original.  
10/4/2011  
optimize MV settings, WTE  
current setting:FiO2, 75%, peep 8  
   
   
                                Cardiac insufficiency 10/04/2011      10/05/2011  
   
                                                      
  
  
Overview:Formatting of this note might be different from the original.  
10/4/2011  
RV mild to moderate post pump  
goal CI>2.3  
   
   
                                Hypoxemia       10/04/2011      10/06/2011  
   
                                discharge planning 2011      10/19/2011  
   
                                                      
  
  
Overview:Formatting of this note might be different from the original.  
age 71,  lives in [place], [place]. No DC needs.  
/  
   
   
                                Pre-op testing  2011      10/04/2011  
   
                                                      
  
  
Overview:Formatting of this note is different from the original.  
Images from the original note were not included.  
[facility]  
PRE-OP CHECKLIST  
  
Surgeon: Deangelo Angulo M.D. Informed Consent Completed: No  
STS Score: 1.4%  
CAD: Yes - CAD on Problem List: Yes  
Is intended procedure a CABG: Yes - is a beta blocker ordered? Yes  
  
H & P completed: Yes  
  
PA/LAT: Completed CT: Completed MRI: N/A LE US: N/A  
  
Cath: Yes - reviewed: Yes Echo:Completed EKG: Completed EF %: 61  
  
PI's: N/A Carotid: Completed Mapping: N/A Dental: Completed PFT's: N/A  
  
Basename 11 0729  
WBC 7.18  
HB 13.1  
HCT 41.8  
  
INR 1.0  
CREAT 1.35  
  
UA: Normal  
HCG:N/A  
  
ABO/ABO Confirmed: Yes  
  
Blood ordered: No  
  
SA Swab: Yes - results: Pending  
  
Last Dose of Anticoagulation: vitamins  
  
Op Note: N/A  
  
Pacemaker Check: N/A  
  
Consults: GI 2001  
  
DM: No  
  
Cardiac Surgical prep: No  
  
SIGNATURE: Krystal Castellanos RN CHECKED BY:  
DATE of SERVICE: 2011  
TIME of SERVICE: 2:23 PM  
  
  
   
   
                                Anemia of chronic disease 2011  
   
                                Intestinal polyp 2011  
   
                                                      
  
  
Overview:Formatting of this note might be different from the original.  
Distal ileum ulcerated polyp.  
   
   
                                Nonspecific abnormal results of liver function s  
tudy 2006  
   
                                Impotence of organic origin 2006  
   
                                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  
                 2021  
  
documented as of this encounter (statuses as of 2022)  
[facility]06- History of Past illness Narrative*   
  
                                Problem         Noted Date      Resolved Date  
   
                                Medicare annual wellness visit, subsequent 2019  
   
                                Encounter for long-term (current) use of medicat  
ions 2018  
   
                                                      
  
  
Overview:Formatting of this note might be different from the original.  
Added automatically from request for surgery [id number]  
   
   
                                History of colonic polyps 2018  
   
                                                      
  
  
Overview:Formatting of this note might be different from the original.  
Added automatically from request for surgery [id number]  
   
   
                                Gastroesophageal reflux disease 2018  
   
                                                      
  
  
Overview:Formatting of this note might be different from the original.  
Added automatically from request for surgery [id number]  
   
   
                                Acute pulmonary edema 10/05/2011      10/06/2011  
   
                                                      
  
  
Overview:Formatting of this note might be different from the original.  
10/5/2011  
cardiogenic and noncardiogenic factors  
  
   
   
                                Atelectasis     10/05/2011      10/06/2011  
   
                                Hypotension     10/04/2011      10/06/2011  
   
                                                      
  
  
Overview:Formatting of this note might be different from the original.  
10/4/2011  
goal map 65-80  
   
   
                                Stress hyperglycemia 10/04/2011      10/06/2011  
   
                                                      
  
  
Overview:Formatting of this note might be different from the original.  
10/4/2011  
Perioperative insulin resistance and exacerbation of hyperglycemia.  
Control blood glucose with titration of insulin infusion  
  
10/5/2011  
adequate control  
goal FBG<180  
  
   
   
                                Post-op pain    10/04/2011      10/06/2011  
   
                                Mechanically assisted ventilation 10/04/2011      
  10/05/2011  
   
                                                      
  
  
Overview:Formatting of this note might be different from the original.  
10/4/2011  
optimize MV settings, WTE  
current setting:FiO2, 75%, peep 8  
   
   
                                Cardiac insufficiency 10/04/2011      10/05/2011  
   
                                                      
  
  
Overview:Formatting of this note might be different from the original.  
10/4/2011  
RV mild to moderate post pump  
goal CI>2.3  
   
   
                                Hypoxemia       10/04/2011      10/06/2011  
   
                                discharge planning 2011      10/19/2011  
   
                                                      
  
  
Overview:Formatting of this note might be different from the original.  
age 71,  lives in [place], [place]. No DC needs.  
/  
   
   
                                Pre-op testing  2011      10/04/2011  
   
                                                      
  
  
Overview:Formatting of this note is different from the original.  
Images from the original note were not included.  
[facility]  
PRE-OP CHECKLIST  
  
Surgeon: Deangelo R. Adele, M.D. Informed Consent Completed: No  
STS Score: 1.4%  
CAD: Yes - CAD on Problem List: Yes  
Is intended procedure a CABG: Yes - is a beta blocker ordered? Yes  
  
H & P completed: Yes  
  
PA/LAT: Completed CT: Completed MRI: N/A LE US: N/A  
  
Cath: Yes - reviewed: Yes Echo:Completed EKG: Completed EF %: 61  
  
PI's: N/A Carotid: Completed Mapping: N/A Dental: Completed PFT's: N/A  
  
Basename 11 0729  
WBC 7.18  
HB 13.1  
HCT 41.8  
  
INR 1.0  
CREAT 1.35  
  
UA: Normal  
HCG:N/A  
  
ABO/ABO Confirmed: Yes  
  
Blood ordered: No  
  
SA Swab: Yes - results: Pending  
  
Last Dose of Anticoagulation: vitamins  
  
Op Note: N/A  
  
Pacemaker Check: N/A  
  
Consults: GI 2001  
  
DM: No  
  
Cardiac Surgical prep: No  
  
SIGNATURE: Krystal Castellanos RN CHECKED BY:  
DATE of SERVICE: 2011  
TIME of SERVICE: 2:23 PM  
  
  
   
   
                                Anemia of chronic disease 2011  
   
                                Intestinal polyp 2011  
   
                                                      
  
  
Overview:Formatting of this note might be different from the original.  
Distal ileum ulcerated polyp.  
   
   
                                Nonspecific abnormal results of liver function s  
tudy 2006  
   
                                Impotence of organic origin 2006  
   
                                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  
                 2021  
  
documented as of this encounter (statuses as of 2022)  
[facility]06- History of Past illness Narrative*   
  
                                Problem         Noted Date      Resolved Date  
   
                                Medicare annual wellness visit, subsequent 2019  
   
                                Encounter for long-term (current) use of medicat  
ions 2018  
   
                                                      
  
  
Overview:Formatting of this note might be different from the original.  
Added automatically from request for surgery [id number]  
   
   
                                History of colonic polyps 2018  
   
                                                      
  
  
Overview:Formatting of this note might be different from the original.  
Added automatically from request for surgery [id number]  
   
   
                                Gastroesophageal reflux disease 2018  
   
                                                      
  
  
Overview:Formatting of this note might be different from the original.  
Added automatically from request for surgery [id number]  
   
   
                                Acute pulmonary edema 10/05/2011      10/06/2011  
   
                                                      
  
  
Overview:Formatting of this note might be different from the original.  
10/5/2011  
cardiogenic and noncardiogenic factors  
  
   
   
                                Atelectasis     10/05/2011      10/06/2011  
   
                                Hypotension     10/04/2011      10/06/2011  
   
                                                      
  
  
Overview:Formatting of this note might be different from the original.  
10/4/2011  
goal map 65-80  
   
   
                                Stress hyperglycemia 10/04/2011      10/06/2011  
   
                                                      
  
  
Overview:Formatting of this note might be different from the original.  
10/4/2011  
Perioperative insulin resistance and exacerbation of hyperglycemia.  
Control blood glucose with titration of insulin infusion  
  
10/5/2011  
adequate control  
goal FBG<180  
  
   
   
                                Post-op pain    10/04/2011      10/06/2011  
   
                                Mechanically assisted ventilation 10/04/2011      
  10/05/2011  
   
                                                      
  
  
Overview:Formatting of this note might be different from the original.  
10/4/2011  
optimize MV settings, WTE  
current setting:FiO2, 75%, peep 8  
   
   
                                Cardiac insufficiency 10/04/2011      10/05/2011  
   
                                                      
  
  
Overview:Formatting of this note might be different from the original.  
10/4/2011  
RV mild to moderate post pump  
goal CI>2.3  
   
   
                                Hypoxemia       10/04/2011      10/06/2011  
   
                                discharge planning 2011      10/19/2011  
   
                                                      
  
  
Overview:Formatting of this note might be different from the original.  
age 71,  lives in [place], [place]. No DC needs.  
/  
   
   
                                Pre-op testing  2011      10/04/2011  
   
                                                      
  
  
Overview:Formatting of this note is different from the original.  
Images from the original note were not included.  
[facility]  
PRE-OP CHECKLIST  
  
Surgeon: Deangelo Angulo M.D. Informed Consent Completed: No  
STS Score: 1.4%  
CAD: Yes - CAD on Problem List: Yes  
Is intended procedure a CABG: Yes - is a beta blocker ordered? Yes  
  
H & P completed: Yes  
  
PA/LAT: Completed CT: Completed MRI: N/A LE US: N/A  
  
Cath: Yes - reviewed: Yes Echo:Completed EKG: Completed EF %: 61  
  
PI's: N/A Carotid: Completed Mapping: N/A Dental: Completed PFT's: N/A  
  
Basename 11 0729  
WBC 7.18  
HB 13.1  
HCT 41.8  
  
INR 1.0  
CREAT 1.35  
  
UA: Normal  
HCG:N/A  
  
ABO/ABO Confirmed: Yes  
  
Blood ordered: No  
  
SA Swab: Yes - results: Pending  
  
Last Dose of Anticoagulation: vitamins  
  
Op Note: N/A  
  
Pacemaker Check: N/A  
  
Consults: GI 2001  
  
DM: No  
  
Cardiac Surgical prep: No  
  
SIGNATURE: Krystal Castellanos RN CHECKED BY:  
DATE of SERVICE: 2011  
TIME of SERVICE: 2:23 PM  
  
  
   
   
                                Anemia of chronic disease 2011  
   
                                Intestinal polyp 2011  
   
                                                      
  
  
Overview:Formatting of this note might be different from the original.  
Distal ileum ulcerated polyp.  
   
   
                                Nonspecific abnormal results of liver function s  
tudy 2006  
   
                                Impotence of organic origin 2006  
   
                                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  
                 2021  
  
documented as of this encounter (statuses as of 2022)  
[facility]06- History of Past illness Narrative*   
  
                                Problem         Noted Date      Resolved Date  
   
                                Medicare annual wellness visit, subsequent 2019  
   
                                Encounter for long-term (current) use of medicat  
ions 2018  
   
                                                      
  
  
Overview:Formatting of this note might be different from the original.  
Added automatically from request for surgery [id number]  
   
   
                                History of colonic polyps 2018  
   
                                                      
  
  
Overview:Formatting of this note might be different from the original.  
Added automatically from request for surgery [id number]  
   
   
                                Gastroesophageal reflux disease 2018  
   
                                                      
  
  
Overview:Formatting of this note might be different from the original.  
Added automatically from request for surgery [id number]  
   
   
                                Acute pulmonary edema 10/05/2011      10/06/2011  
   
                                                      
  
  
Overview:Formatting of this note might be different from the original.  
10/5/2011  
cardiogenic and noncardiogenic factors  
  
   
   
                                Atelectasis     10/05/2011      10/06/2011  
   
                                Hypotension     10/04/2011      10/06/2011  
   
                                                      
  
  
Overview:Formatting of this note might be different from the original.  
10/4/2011  
goal map 65-80  
   
   
                                Stress hyperglycemia 10/04/2011      10/06/2011  
   
                                                      
  
  
Overview:Formatting of this note might be different from the original.  
10/4/2011  
Perioperative insulin resistance and exacerbation of hyperglycemia.  
Control blood glucose with titration of insulin infusion  
  
10/5/2011  
adequate control  
goal FBG<180  
  
   
   
                                Post-op pain    10/04/2011      10/06/2011  
   
                                Mechanically assisted ventilation 10/04/2011      
  10/05/2011  
   
                                                      
  
  
Overview:Formatting of this note might be different from the original.  
10/4/2011  
optimize MV settings, WTE  
current setting:FiO2, 75%, peep 8  
   
   
                                Cardiac insufficiency 10/04/2011      10/05/2011  
   
                                                      
  
  
Overview:Formatting of this note might be different from the original.  
10/4/2011  
RV mild to moderate post pump  
goal CI>2.3  
   
   
                                Hypoxemia       10/04/2011      10/06/2011  
   
                                discharge planning 2011      10/19/2011  
   
                                                      
  
  
Overview:Formatting of this note might be different from the original.  
age 71,  lives in [place], [place]. No DC needs.  
/  
   
   
                                Pre-op testing  2011      10/04/2011  
   
                                                      
  
  
Overview:Formatting of this note is different from the original.  
Images from the original note were not included.  
[facility]  
PRE-OP CHECKLIST  
  
Surgeon: Deangelo Angulo M.D. Informed Consent Completed: No  
STS Score: 1.4%  
CAD: Yes - CAD on Problem List: Yes  
Is intended procedure a CABG: Yes - is a beta blocker ordered? Yes  
  
H & P completed: Yes  
  
PA/LAT: Completed CT: Completed MRI: N/A LE US: N/A  
  
Cath: Yes - reviewed: Yes Echo:Completed EKG: Completed EF %: 61  
  
PI's: N/A Carotid: Completed Mapping: N/A Dental: Completed PFT's: N/A  
  
Basename 11 0729  
WBC 7.18  
HB 13.1  
HCT 41.8  
  
INR 1.0  
CREAT 1.35  
  
UA: Normal  
HCG:N/A  
  
ABO/ABO Confirmed: Yes  
  
Blood ordered: No  
  
SA Swab: Yes - results: Pending  
  
Last Dose of Anticoagulation: vitamins  
  
Op Note: N/A  
  
Pacemaker Check: N/A  
  
Consults: GI 2001  
  
DM: No  
  
Cardiac Surgical prep: No  
  
SIGNATURE: Krystal Castellanos RN CHECKED BY:  
DATE of SERVICE: 2011  
TIME of SERVICE: 2:23 PM  
  
  
   
   
                                Anemia of chronic disease 2011  
   
                                Intestinal polyp 2011  
   
                                                      
  
  
Overview:Formatting of this note might be different from the original.  
Distal ileum ulcerated polyp.  
   
   
                                Nonspecific abnormal results of liver function s  
tudy 2006  
   
                                Impotence of organic origin 2006  
   
                                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  
                 2021  
  
documented as of this encounter (statuses as of 2023)  
[facility]06- History of Past illness Narrative*   
  
                                Problem         Noted Date      Resolved Date  
   
                                Medicare annual wellness visit, subsequent 2019  
   
                                Encounter for long-term (current) use of medicat  
ions 2018  
   
                                                      
  
  
Overview:Formatting of this note might be different from the original.  
Added automatically from request for surgery [id number]  
   
   
                                History of colonic polyps 2018  
   
                                                      
  
  
Overview:Formatting of this note might be different from the original.  
Added automatically from request for surgery [id number]  
   
   
                                Gastroesophageal reflux disease 2018  
   
                                                      
  
  
Overview:Formatting of this note might be different from the original.  
Added automatically from request for surgery [id number]  
   
   
                                Acute pulmonary edema 10/05/2011      10/06/2011  
   
                                                      
  
  
Overview:Formatting of this note might be different from the original.  
10/5/2011  
cardiogenic and noncardiogenic factors  
  
   
   
                                Atelectasis     10/05/2011      10/06/2011  
   
                                Hypotension     10/04/2011      10/06/2011  
   
                                                      
  
  
Overview:Formatting of this note might be different from the original.  
10/4/2011  
goal map 65-80  
   
   
                                Stress hyperglycemia 10/04/2011      10/06/2011  
   
                                                      
  
  
Overview:Formatting of this note might be different from the original.  
10/4/2011  
Perioperative insulin resistance and exacerbation of hyperglycemia.  
Control blood glucose with titration of insulin infusion  
  
10/5/2011  
adequate control  
goal FBG<180  
  
   
   
                                Post-op pain    10/04/2011      10/06/2011  
   
                                Mechanically assisted ventilation 10/04/2011      
  10/05/2011  
   
                                                      
  
  
Overview:Formatting of this note might be different from the original.  
10/4/2011  
optimize MV settings, WTE  
current setting:FiO2, 75%, peep 8  
   
   
                                Cardiac insufficiency 10/04/2011      10/05/2011  
   
                                                      
  
  
Overview:Formatting of this note might be different from the original.  
10/4/2011  
RV mild to moderate post pump  
goal CI>2.3  
   
   
                                Hypoxemia       10/04/2011      10/06/2011  
   
                                discharge planning 2011      10/19/2011  
   
                                                      
  
  
Overview:Formatting of this note might be different from the original.  
age 71,  lives in [place], [place]. No DC needs.  
/  
   
   
                                Pre-op testing  2011      10/04/2011  
   
                                                      
  
  
Overview:Formatting of this note is different from the original.  
Images from the original note were not included.  
[facility]  
PRE-OP CHECKLIST  
  
Surgeon: Deangelo Angulo M.D. Informed Consent Completed: No  
STS Score: 1.4%  
CAD: Yes - CAD on Problem List: Yes  
Is intended procedure a CABG: Yes - is a beta blocker ordered? Yes  
  
H & P completed: Yes  
  
PA/LAT: Completed CT: Completed MRI: N/A LE US: N/A  
  
Cath: Yes - reviewed: Yes Echo:Completed EKG: Completed EF %: 61  
  
PI's: N/A Carotid: Completed Mapping: N/A Dental: Completed PFT's: N/A  
  
Basename 11 0729  
WBC 7.18  
HB 13.1  
HCT 41.8  
  
INR 1.0  
CREAT 1.35  
  
UA: Normal  
HCG:N/A  
  
ABO/ABO Confirmed: Yes  
  
Blood ordered: No  
  
SA Swab: Yes - results: Pending  
  
Last Dose of Anticoagulation: vitamins  
  
Op Note: N/A  
  
Pacemaker Check: N/A  
  
Consults: GI 2001  
  
DM: No  
  
Cardiac Surgical prep: No  
  
SIGNATURE: Krystal Castellanos RN CHECKED BY:  
DATE of SERVICE: 2011  
TIME of SERVICE: 2:23 PM  
  
  
   
   
                                Anemia of chronic disease 2011  
   
                                Intestinal polyp 2011  
   
                                                      
  
  
Overview:Formatting of this note might be different from the original.  
Distal ileum ulcerated polyp.  
   
   
                                Nonspecific abnormal results of liver function s  
tudy 2006  
   
                                Impotence of organic origin 2006  
   
                                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  
                 2021  
  
documented as of this encounter (statuses as of 2023)  
[facility]06- History of Past illness Narrative*   
  
                                Problem         Noted Date      Resolved Date  
   
                                Medicare annual wellness visit, subsequent 2019  
   
                                Encounter for long-term (current) use of medicat  
ions 2018  
   
                                                      
  
  
Overview:Formatting of this note might be different from the original.  
Added automatically from request for surgery [id number]  
   
   
                                History of colonic polyps 2018  
   
                                                      
  
  
Overview:Formatting of this note might be different from the original.  
Added automatically from request for surgery [id number]  
   
   
                                Gastroesophageal reflux disease 2018  
   
                                                      
  
  
Overview:Formatting of this note might be different from the original.  
Added automatically from request for surgery [id number]  
   
   
                                Acute pulmonary edema 10/05/2011      10/06/2011  
   
                                                      
  
  
Overview:Formatting of this note might be different from the original.  
10/5/2011  
cardiogenic and noncardiogenic factors  
  
   
   
                                Atelectasis     10/05/2011      10/06/2011  
   
                                Hypotension     10/04/2011      10/06/2011  
   
                                                      
  
  
Overview:Formatting of this note might be different from the original.  
10/4/2011  
goal map 65-80  
   
   
                                Stress hyperglycemia 10/04/2011      10/06/2011  
   
                                                      
  
  
Overview:Formatting of this note might be different from the original.  
10/4/2011  
Perioperative insulin resistance and exacerbation of hyperglycemia.  
Control blood glucose with titration of insulin infusion  
  
10/5/2011  
adequate control  
goal FBG<180  
  
   
   
                                Post-op pain    10/04/2011      10/06/2011  
   
                                Mechanically assisted ventilation 10/04/2011      
  10/05/2011  
   
                                                      
  
  
Overview:Formatting of this note might be different from the original.  
10/4/2011  
optimize MV settings, WTE  
current setting:FiO2, 75%, peep 8  
   
   
                                Cardiac insufficiency 10/04/2011      10/05/2011  
   
                                                      
  
  
Overview:Formatting of this note might be different from the original.  
10/4/2011  
RV mild to moderate post pump  
goal CI>2.3  
   
   
                                Hypoxemia       10/04/2011      10/06/2011  
   
                                discharge planning 2011      10/19/2011  
   
                                                      
  
  
Overview:Formatting of this note might be different from the original.  
age 71,  lives in [place], [place]. No DC needs.  
/  
   
   
                                Pre-op testing  2011      10/04/2011  
   
                                                      
  
  
Overview:Formatting of this note is different from the original.  
Images from the original note were not included.  
[facility]  
PRE-OP CHECKLIST  
  
Surgeon: Deangelo Angulo M.D. Informed Consent Completed: No  
STS Score: 1.4%  
CAD: Yes - CAD on Problem List: Yes  
Is intended procedure a CABG: Yes - is a beta blocker ordered? Yes  
  
H & P completed: Yes  
  
PA/LAT: Completed CT: Completed MRI: N/A LE US: N/A  
  
Cath: Yes - reviewed: Yes Echo:Completed EKG: Completed EF %: 61  
  
PI's: N/A Carotid: Completed Mapping: N/A Dental: Completed PFT's: N/A  
  
Basename 11 0729  
WBC 7.18  
HB 13.1  
HCT 41.8  
  
INR 1.0  
CREAT 1.35  
  
UA: Normal  
HCG:N/A  
  
ABO/ABO Confirmed: Yes  
  
Blood ordered: No  
  
SA Swab: Yes - results: Pending  
  
Last Dose of Anticoagulation: vitamins  
  
Op Note: N/A  
  
Pacemaker Check: N/A  
  
Consults: GI 2001  
  
DM: No  
  
Cardiac Surgical prep: No  
  
SIGNATURE: Krystal Castellanos RN CHECKED BY:  
DATE of SERVICE: 2011  
TIME of SERVICE: 2:23 PM  
  
  
   
   
                                Anemia of chronic disease 2011  
   
                                Intestinal polyp 2011  
   
                                                      
  
  
Overview:Formatting of this note might be different from the original.  
Distal ileum ulcerated polyp.  
   
   
                                Nonspecific abnormal results of liver function s  
tudy 2006  
   
                                Impotence of organic origin 2006  
   
                                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  
                 2021  
  
documented as of this encounter (statuses as of 2023)  
[facility]06- History of Past illness Narrative*   
  
                                Problem         Noted Date      Resolved Date  
   
                                Medicare annual wellness visit, subsequent 2019  
   
                                Encounter for long-term (current) use of medicat  
ions 2018  
   
                                                      
  
  
Overview:Formatting of this note might be different from the original.  
Added automatically from request for surgery [id number]  
   
   
                                History of colonic polyps 2018  
   
                                                      
  
  
Overview:Formatting of this note might be different from the original.  
Added automatically from request for surgery [id number]  
   
   
                                Gastroesophageal reflux disease 2018  
   
                                                      
  
  
Overview:Formatting of this note might be different from the original.  
Added automatically from request for surgery [id number]  
   
   
                                Acute pulmonary edema 10/05/2011      10/06/2011  
   
                                                      
  
  
Overview:Formatting of this note might be different from the original.  
10/5/2011  
cardiogenic and noncardiogenic factors  
  
   
   
                                Atelectasis     10/05/2011      10/06/2011  
   
                                Hypotension     10/04/2011      10/06/2011  
   
                                                      
  
  
Overview:Formatting of this note might be different from the original.  
10/4/2011  
goal map 65-80  
   
   
                                Stress hyperglycemia 10/04/2011      10/06/2011  
   
                                                      
  
  
Overview:Formatting of this note might be different from the original.  
10/4/2011  
Perioperative insulin resistance and exacerbation of hyperglycemia.  
Control blood glucose with titration of insulin infusion  
  
10/5/2011  
adequate control  
goal FBG<180  
  
   
   
                                Post-op pain    10/04/2011      10/06/2011  
   
                                Mechanically assisted ventilation 10/04/2011      
  10/05/2011  
   
                                                      
  
  
Overview:Formatting of this note might be different from the original.  
10/4/2011  
optimize MV settings, WTE  
current setting:FiO2, 75%, peep 8  
   
   
                                Cardiac insufficiency 10/04/2011      10/05/2011  
   
                                                      
  
  
Overview:Formatting of this note might be different from the original.  
10/4/2011  
RV mild to moderate post pump  
goal CI>2.3  
   
   
                                Hypoxemia       10/04/2011      10/06/2011  
   
                                discharge planning 2011      10/19/2011  
   
                                                      
  
  
Overview:Formatting of this note might be different from the original.  
age 71,  lives in [place], [place]. No DC needs.  
/  
   
   
                                Pre-op testing  2011      10/04/2011  
   
                                                      
  
  
Overview:Formatting of this note is different from the original.  
Images from the original note were not included.  
[facility]  
PRE-OP CHECKLIST  
  
Surgeon: Deangelo Angulo M.D. Informed Consent Completed: No  
STS Score: 1.4%  
CAD: Yes - CAD on Problem List: Yes  
Is intended procedure a CABG: Yes - is a beta blocker ordered? Yes  
  
H & P completed: Yes  
  
PA/LAT: Completed CT: Completed MRI: N/A LE US: N/A  
  
Cath: Yes - reviewed: Yes Echo:Completed EKG: Completed EF %: 61  
  
PI's: N/A Carotid: Completed Mapping: N/A Dental: Completed PFT's: N/A  
  
Basename 11 0729  
WBC 7.18  
HB 13.1  
HCT 41.8  
  
INR 1.0  
CREAT 1.35  
  
UA: Normal  
HCG:N/A  
  
ABO/ABO Confirmed: Yes  
  
Blood ordered: No  
  
SA Swab: Yes - results: Pending  
  
Last Dose of Anticoagulation: vitamins  
  
Op Note: N/A  
  
Pacemaker Check: N/A  
  
Consults: GI 2001  
  
DM: No  
  
Cardiac Surgical prep: No  
  
SIGNATURE: Krystal Castellanos RN CHECKED BY:  
DATE of SERVICE: 2011  
TIME of SERVICE: 2:23 PM  
  
  
   
   
                                Anemia of chronic disease 2011  
   
                                Intestinal polyp 2011  
   
                                                      
  
  
Overview:Formatting of this note might be different from the original.  
Distal ileum ulcerated polyp.  
   
   
                                Nonspecific abnormal results of liver function s  
tudy 2006  
   
                                Impotence of organic origin 2006  
   
                                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  
                 2021  
  
documented as of this encounter (statuses as of 2023)  
[facility]06- History of Past illness Narrative*   
  
                                Problem         Noted Date      Resolved Date  
   
                                Medicare annual wellness visit, subsequent 2019  
   
                                Encounter for long-term (current) use of medicat  
ions 2018  
   
                                                      
  
  
Overview:Formatting of this note might be different from the original.  
Added automatically from request for surgery [id number]  
   
   
                                History of colonic polyps 2018  
   
                                                      
  
  
Overview:Formatting of this note might be different from the original.  
Added automatically from request for surgery [id number]  
   
   
                                Gastroesophageal reflux disease 2018  
   
                                                      
  
  
Overview:Formatting of this note might be different from the original.  
Added automatically from request for surgery [id number]  
   
   
                                Acute pulmonary edema 10/05/2011      10/06/2011  
   
                                                      
  
  
Overview:Formatting of this note might be different from the original.  
10/5/2011  
cardiogenic and noncardiogenic factors  
  
   
   
                                Atelectasis     10/05/2011      10/06/2011  
   
                                Hypotension     10/04/2011      10/06/2011  
   
                                                      
  
  
Overview:Formatting of this note might be different from the original.  
10/4/2011  
goal map 65-80  
   
   
                                Stress hyperglycemia 10/04/2011      10/06/2011  
   
                                                      
  
  
Overview:Formatting of this note might be different from the original.  
10/4/2011  
Perioperative insulin resistance and exacerbation of hyperglycemia.  
Control blood glucose with titration of insulin infusion  
  
10/5/2011  
adequate control  
goal FBG<180  
  
   
   
                                Post-op pain    10/04/2011      10/06/2011  
   
                                Mechanically assisted ventilation 10/04/2011      
  10/05/2011  
   
                                                      
  
  
Overview:Formatting of this note might be different from the original.  
10/4/2011  
optimize MV settings, WTE  
current setting:FiO2, 75%, peep 8  
   
   
                                Cardiac insufficiency 10/04/2011      10/05/2011  
   
                                                      
  
  
Overview:Formatting of this note might be different from the original.  
10/4/2011  
RV mild to moderate post pump  
goal CI>2.3  
   
   
                                Hypoxemia       10/04/2011      10/06/2011  
   
                                discharge planning 2011      10/19/2011  
   
                                                      
  
  
Overview:Formatting of this note might be different from the original.  
age 71,  lives in [place], [place]. No DC needs.  
/  
   
   
                                Pre-op testing  2011      10/04/2011  
   
                                                      
  
  
Overview:Formatting of this note is different from the original.  
Images from the original note were not included.  
[facility]  
PRE-OP CHECKLIST  
  
Surgeon: Deangelo Angulo M.D. Informed Consent Completed: No  
STS Score: 1.4%  
CAD: Yes - CAD on Problem List: Yes  
Is intended procedure a CABG: Yes - is a beta blocker ordered? Yes  
  
H & P completed: Yes  
  
PA/LAT: Completed CT: Completed MRI: N/A LE US: N/A  
  
Cath: Yes - reviewed: Yes Echo:Completed EKG: Completed EF %: 61  
  
PI's: N/A Carotid: Completed Mapping: N/A Dental: Completed PFT's: N/A  
  
Basename 11 0729  
WBC 7.18  
HB 13.1  
HCT 41.8  
  
INR 1.0  
CREAT 1.35  
  
UA: Normal  
HCG:N/A  
  
ABO/ABO Confirmed: Yes  
  
Blood ordered: No  
  
SA Swab: Yes - results: Pending  
  
Last Dose of Anticoagulation: vitamins  
  
Op Note: N/A  
  
Pacemaker Check: N/A  
  
Consults: GI 2001  
  
DM: No  
  
Cardiac Surgical prep: No  
  
SIGNATURE: Krystal Castellanos RN CHECKED BY:  
DATE of SERVICE: 2011  
TIME of SERVICE: 2:23 PM  
  
  
   
   
                                Anemia of chronic disease 2011  
   
                                Intestinal polyp 2011  
   
                                                      
  
  
Overview:Formatting of this note might be different from the original.  
Distal ileum ulcerated polyp.  
   
   
                                Nonspecific abnormal results of liver function s  
pepe 2006  
   
                                Impotence of organic origin 2006      09/0  
2015  
   
                                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  
                 2021  
  
documented as of this encounter (statuses as of 2023)  
[facility]06- History of Past illness Narrative*   
  
                                Problem         Noted Date      Resolved Date  
   
                                Medicare annual wellness visit, subsequent 2019  
   
                                Encounter for long-term (current) use of medicat  
ions 2018  
   
                                                      
  
  
Overview:Formatting of this note might be different from the original.  
Added automatically from request for surgery [id number]  
   
   
                                History of colonic polyps 2018  
   
                                                      
  
  
Overview:Formatting of this note might be different from the original.  
Added automatically from request for surgery [id number]  
   
   
                                Gastroesophageal reflux disease 2018  
   
                                                      
  
  
Overview:Formatting of this note might be different from the original.  
Added automatically from request for surgery [id number]  
   
   
                                Acute pulmonary edema 10/05/2011      10/06/2011  
   
                                                      
  
  
Overview:Formatting of this note might be different from the original.  
10/5/2011  
cardiogenic and noncardiogenic factors  
  
   
   
                                Atelectasis     10/05/2011      10/06/2011  
   
                                Hypotension     10/04/2011      10/06/2011  
   
                                                      
  
  
Overview:Formatting of this note might be different from the original.  
10/4/2011  
goal map 65-80  
   
   
                                Stress hyperglycemia 10/04/2011      10/06/2011  
   
                                                      
  
  
Overview:Formatting of this note might be different from the original.  
10/4/2011  
Perioperative insulin resistance and exacerbation of hyperglycemia.  
Control blood glucose with titration of insulin infusion  
  
10/5/2011  
adequate control  
goal FBG<180  
  
   
   
                                Post-op pain    10/04/2011      10/06/2011  
   
                                Mechanically assisted ventilation 10/04/2011      
  10/05/2011  
   
                                                      
  
  
Overview:Formatting of this note might be different from the original.  
10/4/2011  
optimize MV settings, WTE  
current setting:FiO2, 75%, peep 8  
   
   
                                Cardiac insufficiency 10/04/2011      10/05/2011  
   
                                                      
  
  
Overview:Formatting of this note might be different from the original.  
10/4/2011  
RV mild to moderate post pump  
goal CI>2.3  
   
   
                                Hypoxemia       10/04/2011      10/06/2011  
   
                                discharge planning 2011      10/19/2011  
   
                                                      
  
  
Overview:Formatting of this note might be different from the original.  
age 71,  lives in [place], [place]. No DC needs.  
/  
   
   
                                Pre-op testing  2011      10/04/2011  
   
                                                      
  
  
Overview:Formatting of this note is different from the original.  
Images from the original note were not included.  
[facility]  
PRE-OP CHECKLIST  
  
Surgeon: Deangelo Angulo M.D. Informed Consent Completed: No  
STS Score: 1.4%  
CAD: Yes - CAD on Problem List: Yes  
Is intended procedure a CABG: Yes - is a beta blocker ordered? Yes  
  
H & P completed: Yes  
  
PA/LAT: Completed CT: Completed MRI: N/A LE US: N/A  
  
Cath: Yes - reviewed: Yes Echo:Completed EKG: Completed EF %: 61  
  
PI's: N/A Carotid: Completed Mapping: N/A Dental: Completed PFT's: N/A  
  
Basename 11 0729  
WBC 7.18  
HB 13.1  
HCT 41.8  
  
INR 1.0  
CREAT 1.35  
  
UA: Normal  
HCG:N/A  
  
ABO/ABO Confirmed: Yes  
  
Blood ordered: No  
  
SA Swab: Yes - results: Pending  
  
Last Dose of Anticoagulation: vitamins  
  
Op Note: N/A  
  
Pacemaker Check: N/A  
  
Consults: GI 2001  
  
DM: No  
  
Cardiac Surgical prep: No  
  
SIGNATURE: Krystal Castellanos RN CHECKED BY:  
DATE of SERVICE: 2011  
TIME of SERVICE: 2:23 PM  
  
  
   
   
                                Anemia of chronic disease 2011  
   
                                Intestinal polyp 2011  
   
                                                      
  
  
Overview:Formatting of this note might be different from the original.  
Distal ileum ulcerated polyp.  
   
   
                                Nonspecific abnormal results of liver function s  
pepe 2006  
   
                                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  
2015  
   
                                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  
                 2021  
  
documented as of this encounter (statuses as of 2023)  
[facility]06- History of Past illness Narrative*   
  
                                Problem         Noted Date      Resolved Date  
   
                                Medicare annual wellness visit, subsequent 2019  
   
                                Encounter for long-term (current) use of medicat  
ions 2018  
   
                                                      
  
  
Overview:Formatting of this note might be different from the original.  
Added automatically from request for surgery [id number]  
   
   
                                History of colonic polyps 2018  
   
                                                      
  
  
Overview:Formatting of this note might be different from the original.  
Added automatically from request for surgery [id number]  
   
   
                                Gastroesophageal reflux disease 2018  
   
                                                      
  
  
Overview:Formatting of this note might be different from the original.  
Added automatically from request for surgery [id number]  
   
   
                                Acute pulmonary edema 10/05/2011      10/06/2011  
   
                                                      
  
  
Overview:Formatting of this note might be different from the original.  
10/5/2011  
cardiogenic and noncardiogenic factors  
  
   
   
                                Atelectasis     10/05/2011      10/06/2011  
   
                                Hypotension     10/04/2011      10/06/2011  
   
                                                      
  
  
Overview:Formatting of this note might be different from the original.  
10/4/2011  
goal map 65-80  
   
   
                                Stress hyperglycemia 10/04/2011      10/06/2011  
   
                                                      
  
  
Overview:Formatting of this note might be different from the original.  
10/4/2011  
Perioperative insulin resistance and exacerbation of hyperglycemia.  
Control blood glucose with titration of insulin infusion  
  
10/5/2011  
adequate control  
goal FBG<180  
  
   
   
                                Post-op pain    10/04/2011      10/06/2011  
   
                                Mechanically assisted ventilation 10/04/2011      
  10/05/2011  
   
                                                      
  
  
Overview:Formatting of this note might be different from the original.  
10/4/2011  
optimize MV settings, WTE  
current setting:FiO2, 75%, peep 8  
   
   
                                Cardiac insufficiency 10/04/2011      10/05/2011  
   
                                                      
  
  
Overview:Formatting of this note might be different from the original.  
10/4/2011  
RV mild to moderate post pump  
goal CI>2.3  
   
   
                                Hypoxemia       10/04/2011      10/06/2011  
   
                                discharge planning 2011      10/19/2011  
   
                                                      
  
  
Overview:Formatting of this note might be different from the original.  
age 71,  lives in [place], [place]. No DC needs.  
/  
   
   
                                Pre-op testing  2011      10/04/2011  
   
                                                      
  
  
Overview:Formatting of this note is different from the original.  
Images from the original note were not included.  
[facility]  
PRE-OP CHECKLIST  
  
Surgeon: Deangelo Angulo M.D. Informed Consent Completed: No  
STS Score: 1.4%  
CAD: Yes - CAD on Problem List: Yes  
Is intended procedure a CABG: Yes - is a beta blocker ordered? Yes  
  
H & P completed: Yes  
  
PA/LAT: Completed CT: Completed MRI: N/A LE US: N/A  
  
Cath: Yes - reviewed: Yes Echo:Completed EKG: Completed EF %: 61  
  
PI's: N/A Carotid: Completed Mapping: N/A Dental: Completed PFT's: N/A  
  
Basename 11 0729  
WBC 7.18  
HB 13.1  
HCT 41.8  
  
INR 1.0  
CREAT 1.35  
  
UA: Normal  
HCG:N/A  
  
ABO/ABO Confirmed: Yes  
  
Blood ordered: No  
  
SA Swab: Yes - results: Pending  
  
Last Dose of Anticoagulation: vitamins  
  
Op Note: N/A  
  
Pacemaker Check: N/A  
  
Consults: GI 2001  
  
DM: No  
  
Cardiac Surgical prep: No  
  
SIGNATURE: Krystal Castellanos RN CHECKED BY:  
DATE of SERVICE: 2011  
TIME of SERVICE: 2:23 PM  
  
  
   
   
                                Anemia of chronic disease 2011  
   
                                Intestinal polyp 2011  
   
                                                      
  
  
Overview:Formatting of this note might be different from the original.  
Distal ileum ulcerated polyp.  
   
   
                                Nonspecific abnormal results of liver function s  
nialldy 2006  
   
                                Impotence of organic origin 2006  
   
                                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  
                 2021  
  
documented as of this encounter (statuses as of 2023)  
[facility]06- History of Past illness Narrative*   
  
                                Problem         Noted Date      Resolved Date  
   
                                Medicare annual wellness visit, subsequent 2019  
   
                                Encounter for long-term (current) use of medicat  
ions 2018  
   
                                                      
  
  
Overview:Formatting of this note might be different from the original.  
Added automatically from request for surgery [id number]  
   
   
                                History of colonic polyps 2018  
   
                                                      
  
  
Overview:Formatting of this note might be different from the original.  
Added automatically from request for surgery [id number]  
   
   
                                Gastroesophageal reflux disease 2018  
   
                                                      
  
  
Overview:Formatting of this note might be different from the original.  
Added automatically from request for surgery [id number]  
   
   
                                Acute pulmonary edema 10/05/2011      10/06/2011  
   
                                                      
  
  
Overview:Formatting of this note might be different from the original.  
10/5/2011  
cardiogenic and noncardiogenic factors  
  
   
   
                                Atelectasis     10/05/2011      10/06/2011  
   
                                Hypotension     10/04/2011      10/06/2011  
   
                                                      
  
  
Overview:Formatting of this note might be different from the original.  
10/4/2011  
goal map 65-80  
   
   
                                Stress hyperglycemia 10/04/2011      10/06/2011  
   
                                                      
  
  
Overview:Formatting of this note might be different from the original.  
10/4/2011  
Perioperative insulin resistance and exacerbation of hyperglycemia.  
Control blood glucose with titration of insulin infusion  
  
10/5/2011  
adequate control  
goal FBG<180  
  
   
   
                                Post-op pain    10/04/2011      10/06/2011  
   
                                Mechanically assisted ventilation 10/04/2011      
  10/05/2011  
   
                                                      
  
  
Overview:Formatting of this note might be different from the original.  
10/4/2011  
optimize MV settings, WTE  
current setting:FiO2, 75%, peep 8  
   
   
                                Cardiac insufficiency 10/04/2011      10/05/2011  
   
                                                      
  
  
Overview:Formatting of this note might be different from the original.  
10/4/2011  
RV mild to moderate post pump  
goal CI>2.3  
   
   
                                Hypoxemia       10/04/2011      10/06/2011  
   
                                discharge planning 2011      10/19/2011  
   
                                                      
  
  
Overview:Formatting of this note might be different from the original.  
age 71,  lives in [place], [place]. No DC needs.  
/  
   
   
                                Pre-op testing  2011      10/04/2011  
   
                                                      
  
  
Overview:Formatting of this note is different from the original.  
Images from the original note were not included.  
[facility]  
PRE-OP CHECKLIST  
  
Surgeon: Deangelo Angulo M.D. Informed Consent Completed: No  
STS Score: 1.4%  
CAD: Yes - CAD on Problem List: Yes  
Is intended procedure a CABG: Yes - is a beta blocker ordered? Yes  
  
H & P completed: Yes  
  
PA/LAT: Completed CT: Completed MRI: N/A LE US: N/A  
  
Cath: Yes - reviewed: Yes Echo:Completed EKG: Completed EF %: 61  
  
PI's: N/A Carotid: Completed Mapping: N/A Dental: Completed PFT's: N/A  
  
Basename 11 0729  
WBC 7.18  
HB 13.1  
HCT 41.8  
  
INR 1.0  
CREAT 1.35  
  
UA: Normal  
HCG:N/A  
  
ABO/ABO Confirmed: Yes  
  
Blood ordered: No  
  
SA Swab: Yes - results: Pending  
  
Last Dose of Anticoagulation: vitamins  
  
Op Note: N/A  
  
Pacemaker Check: N/A  
  
Consults: GI 2001  
  
DM: No  
  
Cardiac Surgical prep: No  
  
SIGNATURE: Krystal Castellanos RN CHECKED BY:  
DATE of SERVICE: 2011  
TIME of SERVICE: 2:23 PM  
  
  
   
   
                                Anemia of chronic disease 2011  
   
                                Intestinal polyp 2011  
   
                                                      
  
  
Overview:Formatting of this note might be different from the original.  
Distal ileum ulcerated polyp.  
   
   
                                Nonspecific abnormal results of liver function s  
tudy 2006  
   
                                Impotence of organic origin 2006  
   
                                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  
                 2021  
  
documented as of this encounter (statuses as of 2023)  
[facility]06- History of Past illness Narrative*   
  
                                Problem         Noted Date      Resolved Date  
   
                                Medicare annual wellness visit, subsequent 2019  
   
                                Encounter for long-term (current) use of medicat  
ions 2018  
   
                                                      
  
  
Overview:Formatting of this note might be different from the original.  
Added automatically from request for surgery [id number]  
   
   
                                History of colonic polyps 2018  
   
                                                      
  
  
Overview:Formatting of this note might be different from the original.  
Added automatically from request for surgery [id number]  
   
   
                                Gastroesophageal reflux disease 2018  
   
                                                      
  
  
Overview:Formatting of this note might be different from the original.  
Added automatically from request for surgery [id number]  
   
   
                                Acute pulmonary edema 10/05/2011      10/06/2011  
   
                                                      
  
  
Overview:Formatting of this note might be different from the original.  
10/5/2011  
cardiogenic and noncardiogenic factors  
  
   
   
                                Atelectasis     10/05/2011      10/06/2011  
   
                                Hypotension     10/04/2011      10/06/2011  
   
                                                      
  
  
Overview:Formatting of this note might be different from the original.  
10/4/2011  
goal map 65-80  
   
   
                                Stress hyperglycemia 10/04/2011      10/06/2011  
   
                                                      
  
  
Overview:Formatting of this note might be different from the original.  
10/4/2011  
Perioperative insulin resistance and exacerbation of hyperglycemia.  
Control blood glucose with titration of insulin infusion  
  
10/5/2011  
adequate control  
goal FBG<180  
  
   
   
                                Post-op pain    10/04/2011      10/06/2011  
   
                                Mechanically assisted ventilation 10/04/2011      
  10/05/2011  
   
                                                      
  
  
Overview:Formatting of this note might be different from the original.  
10/4/2011  
optimize MV settings, WTE  
current setting:FiO2, 75%, peep 8  
   
   
                                Cardiac insufficiency 10/04/2011      10/05/2011  
   
                                                      
  
  
Overview:Formatting of this note might be different from the original.  
10/4/2011  
RV mild to moderate post pump  
goal CI>2.3  
   
   
                                Hypoxemia       10/04/2011      10/06/2011  
   
                                discharge planning 2011      10/19/2011  
   
                                                      
  
  
Overview:Formatting of this note might be different from the original.  
age 71,  lives in [place], [place]. No DC needs.  
/  
   
   
                                Pre-op testing  2011      10/04/2011  
   
                                                      
  
  
Overview:Formatting of this note is different from the original.  
Images from the original note were not included.  
[facility]  
PRE-OP CHECKLIST  
  
Surgeon: Deangelo Angulo M.D. Informed Consent Completed: No  
STS Score: 1.4%  
CAD: Yes - CAD on Problem List: Yes  
Is intended procedure a CABG: Yes - is a beta blocker ordered? Yes  
  
H & P completed: Yes  
  
PA/LAT: Completed CT: Completed MRI: N/A LE US: N/A  
  
Cath: Yes - reviewed: Yes Echo:Completed EKG: Completed EF %: 61  
  
PI's: N/A Carotid: Completed Mapping: N/A Dental: Completed PFT's: N/A  
  
Basename 11 0729  
WBC 7.18  
HB 13.1  
HCT 41.8  
  
INR 1.0  
CREAT 1.35  
  
UA: Normal  
HCG:N/A  
  
ABO/ABO Confirmed: Yes  
  
Blood ordered: No  
  
SA Swab: Yes - results: Pending  
  
Last Dose of Anticoagulation: vitamins  
  
Op Note: N/A  
  
Pacemaker Check: N/A  
  
Consults: GI 2001  
  
DM: No  
  
Cardiac Surgical prep: No  
  
SIGNATURE: Krystal Castellanos RN CHECKED BY:  
DATE of SERVICE: 2011  
TIME of SERVICE: 2:23 PM  
  
  
   
   
                                Anemia of chronic disease 2011  
   
                                Intestinal polyp 2011  
   
                                                      
  
  
Overview:Formatting of this note might be different from the original.  
Distal ileum ulcerated polyp.  
   
   
                                Nonspecific abnormal results of liver function s  
tudy 2006  
   
                                Impotence of organic origin 2006  
   
                                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  
                 2021  
  
documented as of this encounter (statuses as of 2023)  
[facility]06- History of Past illness Narrative*   
  
                                Problem         Noted Date      Resolved Date  
   
                                Medicare annual wellness visit, subsequent 2019  
   
                                Encounter for long-term (current) use of medicat  
ions 2018  
   
                                                      
  
  
Overview:Formatting of this note might be different from the original.  
Added automatically from request for surgery [id number]  
   
   
                                History of colonic polyps 2018  
   
                                                      
  
  
Overview:Formatting of this note might be different from the original.  
Added automatically from request for surgery [id number]  
   
   
                                Gastroesophageal reflux disease 2018  
   
                                                      
  
  
Overview:Formatting of this note might be different from the original.  
Added automatically from request for surgery [id number]  
   
   
                                Acute pulmonary edema 10/05/2011      10/06/2011  
   
                                                      
  
  
Overview:Formatting of this note might be different from the original.  
10/5/2011  
cardiogenic and noncardiogenic factors  
  
   
   
                                Atelectasis     10/05/2011      10/06/2011  
   
                                Hypotension     10/04/2011      10/06/2011  
   
                                                      
  
  
Overview:Formatting of this note might be different from the original.  
10/4/2011  
goal map 65-80  
   
   
                                Stress hyperglycemia 10/04/2011      10/06/2011  
   
                                                      
  
  
Overview:Formatting of this note might be different from the original.  
10/4/2011  
Perioperative insulin resistance and exacerbation of hyperglycemia.  
Control blood glucose with titration of insulin infusion  
  
10/5/2011  
adequate control  
goal FBG<180  
  
   
   
                                Post-op pain    10/04/2011      10/06/2011  
   
                                Mechanically assisted ventilation 10/04/2011      
  10/05/2011  
   
                                                      
  
  
Overview:Formatting of this note might be different from the original.  
10/4/2011  
optimize MV settings, WTE  
current setting:FiO2, 75%, peep 8  
   
   
                                Cardiac insufficiency 10/04/2011      10/05/2011  
   
                                                      
  
  
Overview:Formatting of this note might be different from the original.  
10/4/2011  
RV mild to moderate post pump  
goal CI>2.3  
   
   
                                Hypoxemia       10/04/2011      10/06/2011  
   
                                discharge planning 2011      10/19/2011  
   
                                                      
  
  
Overview:Formatting of this note might be different from the original.  
age 71,  lives in [place], [place]. No DC needs.  
/  
   
   
                                Pre-op testing  2011      10/04/2011  
   
                                                      
  
  
Overview:Formatting of this note is different from the original.  
Images from the original note were not included.  
[facility]  
PRE-OP CHECKLIST  
  
Surgeon: Deangelo Angulo M.D. Informed Consent Completed: No  
STS Score: 1.4%  
CAD: Yes - CAD on Problem List: Yes  
Is intended procedure a CABG: Yes - is a beta blocker ordered? Yes  
  
H & P completed: Yes  
  
PA/LAT: Completed CT: Completed MRI: N/A LE US: N/A  
  
Cath: Yes - reviewed: Yes Echo:Completed EKG: Completed EF %: 61  
  
PI's: N/A Carotid: Completed Mapping: N/A Dental: Completed PFT's: N/A  
  
Basename 11 0729  
WBC 7.18  
HB 13.1  
HCT 41.8  
  
INR 1.0  
CREAT 1.35  
  
UA: Normal  
HCG:N/A  
  
ABO/ABO Confirmed: Yes  
  
Blood ordered: No  
  
SA Swab: Yes - results: Pending  
  
Last Dose of Anticoagulation: vitamins  
  
Op Note: N/A  
  
Pacemaker Check: N/A  
  
Consults: GI 2001  
  
DM: No  
  
Cardiac Surgical prep: No  
  
SIGNATURE: Krystal Castellanos RN CHECKED BY:  
DATE of SERVICE: 2011  
TIME of SERVICE: 2:23 PM  
  
  
   
   
                                Anemia of chronic disease 2011  
   
                                Intestinal polyp 2011  
   
                                                      
  
  
Overview:Formatting of this note might be different from the original.  
Distal ileum ulcerated polyp.  
   
   
                                Nonspecific abnormal results of liver function s  
pepe 2006  
   
                                Impotence of organic origin 2006  
   
                                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  
                 2021  
  
documented as of this encounter (statuses as of 2023)  
[facility]06- History of Past illness Narrative*   
  
                                Problem         Noted Date      Resolved Date  
   
                                Medicare annual wellness visit, subsequent 2019  
   
                                Encounter for long-term (current) use of medicat  
ions 2018  
   
                                                      
  
  
Overview:Formatting of this note might be different from the original.  
Added automatically from request for surgery [id number]  
   
   
                                History of colonic polyps 2018  
   
                                                      
  
  
Overview:Formatting of this note might be different from the original.  
Added automatically from request for surgery [id number]  
   
   
                                Gastroesophageal reflux disease 2018  
   
                                                      
  
  
Overview:Formatting of this note might be different from the original.  
Added automatically from request for surgery [id number]  
   
   
                                Acute pulmonary edema 10/05/2011      10/06/2011  
   
                                                      
  
  
Overview:Formatting of this note might be different from the original.  
10/5/2011  
cardiogenic and noncardiogenic factors  
  
   
   
                                Atelectasis     10/05/2011      10/06/2011  
   
                                Hypotension     10/04/2011      10/06/2011  
   
                                                      
  
  
Overview:Formatting of this note might be different from the original.  
10/4/2011  
goal map 65-80  
   
   
                                Stress hyperglycemia 10/04/2011      10/06/2011  
   
                                                      
  
  
Overview:Formatting of this note might be different from the original.  
10/4/2011  
Perioperative insulin resistance and exacerbation of hyperglycemia.  
Control blood glucose with titration of insulin infusion  
  
10/5/2011  
adequate control  
goal FBG<180  
  
   
   
                                Post-op pain    10/04/2011      10/06/2011  
   
                                Mechanically assisted ventilation 10/04/2011      
  10/05/2011  
   
                                                      
  
  
Overview:Formatting of this note might be different from the original.  
10/4/2011  
optimize MV settings, WTE  
current setting:FiO2, 75%, peep 8  
   
   
                                Cardiac insufficiency 10/04/2011      10/05/2011  
   
                                                      
  
  
Overview:Formatting of this note might be different from the original.  
10/4/2011  
RV mild to moderate post pump  
goal CI>2.3  
   
   
                                Hypoxemia       10/04/2011      10/06/2011  
   
                                discharge planning 2011      10/19/2011  
   
                                                      
  
  
Overview:Formatting of this note might be different from the original.  
age 71,  lives in [place], [place]. No DC needs.  
/  
   
   
                                Pre-op testing  2011      10/04/2011  
   
                                                      
  
  
Overview:Formatting of this note is different from the original.  
Images from the original note were not included.  
[facility]  
PRE-OP CHECKLIST  
  
Surgeon: Deangelo Angulo M.D. Informed Consent Completed: No  
STS Score: 1.4%  
CAD: Yes - CAD on Problem List: Yes  
Is intended procedure a CABG: Yes - is a beta blocker ordered? Yes  
  
H & P completed: Yes  
  
PA/LAT: Completed CT: Completed MRI: N/A LE US: N/A  
  
Cath: Yes - reviewed: Yes Echo:Completed EKG: Completed EF %: 61  
  
PI's: N/A Carotid: Completed Mapping: N/A Dental: Completed PFT's: N/A  
  
Basename 11 0729  
WBC 7.18  
HB 13.1  
HCT 41.8  
  
INR 1.0  
CREAT 1.35  
  
UA: Normal  
HCG:N/A  
  
ABO/ABO Confirmed: Yes  
  
Blood ordered: No  
  
SA Swab: Yes - results: Pending  
  
Last Dose of Anticoagulation: vitamins  
  
Op Note: N/A  
  
Pacemaker Check: N/A  
  
Consults: GI 2001  
  
DM: No  
  
Cardiac Surgical prep: No  
  
SIGNATURE: Krystal Castellanos RN CHECKED BY:  
DATE of SERVICE: 2011  
TIME of SERVICE: 2:23 PM  
  
  
   
   
                                Anemia of chronic disease 2011  
   
                                Intestinal polyp 2011  
   
                                                      
  
  
Overview:Formatting of this note might be different from the original.  
Distal ileum ulcerated polyp.  
   
   
                                Nonspecific abnormal results of liver function s  
tudy 2006  
   
                                Impotence of organic origin 2006  
   
                                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  
                 2021  
  
documented as of this encounter (statuses as of 2023)  
[facility]06- History of Past illness Narrative*   
  
                                Problem         Noted Date      Resolved Date  
   
                                Medicare annual wellness visit, subsequent 2019  
   
                                Encounter for long-term (current) use of medicat  
ions 2018  
   
                                                      
  
  
Overview:Formatting of this note might be different from the original.  
Added automatically from request for surgery [id number]  
   
   
                                History of colonic polyps 2018  
   
                                                      
  
  
Overview:Formatting of this note might be different from the original.  
Added automatically from request for surgery [id number]  
   
   
                                Gastroesophageal reflux disease 2018  
   
                                                      
  
  
Overview:Formatting of this note might be different from the original.  
Added automatically from request for surgery [id number]  
   
   
                                Acute pulmonary edema 10/05/2011      10/06/2011  
   
                                                      
  
  
Overview:Formatting of this note might be different from the original.  
10/5/2011  
cardiogenic and noncardiogenic factors  
  
   
   
                                Atelectasis     10/05/2011      10/06/2011  
   
                                Hypotension     10/04/2011      10/06/2011  
   
                                                      
  
  
Overview:Formatting of this note might be different from the original.  
10/4/2011  
goal map 65-80  
   
   
                                Stress hyperglycemia 10/04/2011      10/06/2011  
   
                                                      
  
  
Overview:Formatting of this note might be different from the original.  
10/4/2011  
Perioperative insulin resistance and exacerbation of hyperglycemia.  
Control blood glucose with titration of insulin infusion  
  
10/5/2011  
adequate control  
goal FBG<180  
  
   
   
                                Post-op pain    10/04/2011      10/06/2011  
   
                                Mechanically assisted ventilation 10/04/2011      
  10/05/2011  
   
                                                      
  
  
Overview:Formatting of this note might be different from the original.  
10/4/2011  
optimize MV settings, WTE  
current setting:FiO2, 75%, peep 8  
   
   
                                Cardiac insufficiency 10/04/2011      10/05/2011  
   
                                                      
  
  
Overview:Formatting of this note might be different from the original.  
10/4/2011  
RV mild to moderate post pump  
goal CI>2.3  
   
   
                                Hypoxemia       10/04/2011      10/06/2011  
   
                                discharge planning 2011      10/19/2011  
   
                                                      
  
  
Overview:Formatting of this note might be different from the original.  
age 71,  lives in [place], [place]. No DC needs.  
/  
   
   
                                Pre-op testing  2011      10/04/2011  
   
                                                      
  
  
Overview:Formatting of this note is different from the original.  
Images from the original note were not included.  
[facility]  
PRE-OP CHECKLIST  
  
Surgeon: Deangelo Angulo M.D. Informed Consent Completed: No  
STS Score: 1.4%  
CAD: Yes - CAD on Problem List: Yes  
Is intended procedure a CABG: Yes - is a beta blocker ordered? Yes  
  
H & P completed: Yes  
  
PA/LAT: Completed CT: Completed MRI: N/A LE US: N/A  
  
Cath: Yes - reviewed: Yes Echo:Completed EKG: Completed EF %: 61  
  
PI's: N/A Carotid: Completed Mapping: N/A Dental: Completed PFT's: N/A  
  
Basename 11 0729  
WBC 7.18  
HB 13.1  
HCT 41.8  
  
INR 1.0  
CREAT 1.35  
  
UA: Normal  
HCG:N/A  
  
ABO/ABO Confirmed: Yes  
  
Blood ordered: No  
  
SA Swab: Yes - results: Pending  
  
Last Dose of Anticoagulation: vitamins  
  
Op Note: N/A  
  
Pacemaker Check: N/A  
  
Consults: GI 2001  
  
DM: No  
  
Cardiac Surgical prep: No  
  
SIGNATURE: Krystal Castellanos RN CHECKED BY:  
DATE of SERVICE: 2011  
TIME of SERVICE: 2:23 PM  
  
  
   
   
                                Anemia of chronic disease 2011  
   
                                Intestinal polyp 2011  
   
                                                      
  
  
Overview:Formatting of this note might be different from the original.  
Distal ileum ulcerated polyp.  
   
   
                                Nonspecific abnormal results of liver function s  
tudy 2006  
   
                                Impotence of organic origin 2006  
   
                                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  
                 2021  
  
documented as of this encounter (statuses as of 2023)  
[facility]06- History of Past illness Narrative*   
  
                                Problem         Noted Date      Resolved Date  
   
                                Medicare annual wellness visit, subsequent 2019  
   
                                Encounter for long-term (current) use of medicat  
ions 2018  
   
                                                      
  
  
Overview:Formatting of this note might be different from the original.  
Added automatically from request for surgery [id number]  
   
   
                                History of colonic polyps 2018  
   
                                                      
  
  
Overview:Formatting of this note might be different from the original.  
Added automatically from request for surgery [id number]  
   
   
                                Gastroesophageal reflux disease 2018  
   
                                                      
  
  
Overview:Formatting of this note might be different from the original.  
Added automatically from request for surgery [id number]  
   
   
                                Acute pulmonary edema 10/05/2011      10/06/2011  
   
                                                      
  
  
Overview:Formatting of this note might be different from the original.  
10/5/2011  
cardiogenic and noncardiogenic factors  
  
   
   
                                Atelectasis     10/05/2011      10/06/2011  
   
                                Hypotension     10/04/2011      10/06/2011  
   
                                                      
  
  
Overview:Formatting of this note might be different from the original.  
10/4/2011  
goal map 65-80  
   
   
                                Stress hyperglycemia 10/04/2011      10/06/2011  
   
                                                      
  
  
Overview:Formatting of this note might be different from the original.  
10/4/2011  
Perioperative insulin resistance and exacerbation of hyperglycemia.  
Control blood glucose with titration of insulin infusion  
  
10/5/2011  
adequate control  
goal FBG<180  
  
   
   
                                Post-op pain    10/04/2011      10/06/2011  
   
                                Mechanically assisted ventilation 10/04/2011      
  10/05/2011  
   
                                                      
  
  
Overview:Formatting of this note might be different from the original.  
10/4/2011  
optimize MV settings, WTE  
current setting:FiO2, 75%, peep 8  
   
   
                                Cardiac insufficiency 10/04/2011      10/05/2011  
   
                                                      
  
  
Overview:Formatting of this note might be different from the original.  
10/4/2011  
RV mild to moderate post pump  
goal CI>2.3  
   
   
                                Hypoxemia       10/04/2011      10/06/2011  
   
                                discharge planning 2011      10/19/2011  
   
                                                      
  
  
Overview:Formatting of this note might be different from the original.  
age 71,  lives in [place], [place]. No DC needs.  
/  
   
   
                                Pre-op testing  2011      10/04/2011  
   
                                                      
  
  
Overview:Formatting of this note is different from the original.  
Images from the original note were not included.  
[facility]  
PRE-OP CHECKLIST  
  
Surgeon: Deangelo Angulo M.D. Informed Consent Completed: No  
STS Score: 1.4%  
CAD: Yes - CAD on Problem List: Yes  
Is intended procedure a CABG: Yes - is a beta blocker ordered? Yes  
  
H & P completed: Yes  
  
PA/LAT: Completed CT: Completed MRI: N/A LE US: N/A  
  
Cath: Yes - reviewed: Yes Echo:Completed EKG: Completed EF %: 61  
  
PI's: N/A Carotid: Completed Mapping: N/A Dental: Completed PFT's: N/A  
  
Basename 11 0729  
WBC 7.18  
HB 13.1  
HCT 41.8  
  
INR 1.0  
CREAT 1.35  
  
UA: Normal  
HCG:N/A  
  
ABO/ABO Confirmed: Yes  
  
Blood ordered: No  
  
SA Swab: Yes - results: Pending  
  
Last Dose of Anticoagulation: vitamins  
  
Op Note: N/A  
  
Pacemaker Check: N/A  
  
Consults: GI 2001  
  
DM: No  
  
Cardiac Surgical prep: No  
  
SIGNATURE: Krystal Castellanos RN CHECKED BY:  
DATE of SERVICE: 2011  
TIME of SERVICE: 2:23 PM  
  
  
   
   
                                Anemia of chronic disease 2011  
   
                                Intestinal polyp 2011  
   
                                                      
  
  
Overview:Formatting of this note might be different from the original.  
Distal ileum ulcerated polyp.  
   
   
                                Nonspecific abnormal results of liver function s  
pepe 2006  
   
                                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  
                 2021  
  
documented as of this encounter (statuses as of 2023)  
[facility]06- History of Past illness Narrative*   
  
                                Problem         Noted Date      Resolved Date  
   
                                Medicare annual wellness visit, subsequent 2019  
   
                                Encounter for long-term (current) use of medicat  
ions 2018  
   
                                                      
  
  
Overview:Formatting of this note might be different from the original.  
Added automatically from request for surgery [id number]  
   
   
                                History of colonic polyps 2018  
   
                                                      
  
  
Overview:Formatting of this note might be different from the original.  
Added automatically from request for surgery [id number]  
   
   
                                Gastroesophageal reflux disease 2018  
   
                                                      
  
  
Overview:Formatting of this note might be different from the original.  
Added automatically from request for surgery [id number]  
   
   
                                Acute pulmonary edema 10/05/2011      10/06/2011  
   
                                                      
  
  
Overview:Formatting of this note might be different from the original.  
10/5/2011  
cardiogenic and noncardiogenic factors  
  
   
   
                                Atelectasis     10/05/2011      10/06/2011  
   
                                Hypotension     10/04/2011      10/06/2011  
   
                                                      
  
  
Overview:Formatting of this note might be different from the original.  
10/4/2011  
goal map 65-80  
   
   
                                Stress hyperglycemia 10/04/2011      10/06/2011  
   
                                                      
  
  
Overview:Formatting of this note might be different from the original.  
10/4/2011  
Perioperative insulin resistance and exacerbation of hyperglycemia.  
Control blood glucose with titration of insulin infusion  
  
10/5/2011  
adequate control  
goal FBG<180  
  
   
   
                                Post-op pain    10/04/2011      10/06/2011  
   
                                Mechanically assisted ventilation 10/04/2011      
  10/05/2011  
   
                                                      
  
  
Overview:Formatting of this note might be different from the original.  
10/4/2011  
optimize MV settings, WTE  
current setting:FiO2, 75%, peep 8  
   
   
                                Cardiac insufficiency 10/04/2011      10/05/2011  
   
                                                      
  
  
Overview:Formatting of this note might be different from the original.  
10/4/2011  
RV mild to moderate post pump  
goal CI>2.3  
   
   
                                Hypoxemia       10/04/2011      10/06/2011  
   
                                discharge planning 2011      10/19/2011  
   
                                                      
  
  
Overview:Formatting of this note might be different from the original.  
age 71,  lives in [place], [place]. No DC needs.  
/  
   
   
                                Pre-op testing  2011      10/04/2011  
   
                                                      
  
  
Overview:Formatting of this note is different from the original.  
Images from the original note were not included.  
[facility]  
PRE-OP CHECKLIST  
  
Surgeon: Deangelo Angulo M.D. Informed Consent Completed: No  
STS Score: 1.4%  
CAD: Yes - CAD on Problem List: Yes  
Is intended procedure a CABG: Yes - is a beta blocker ordered? Yes  
  
H & P completed: Yes  
  
PA/LAT: Completed CT: Completed MRI: N/A LE US: N/A  
  
Cath: Yes - reviewed: Yes Echo:Completed EKG: Completed EF %: 61  
  
PI's: N/A Carotid: Completed Mapping: N/A Dental: Completed PFT's: N/A  
  
Basename 11 0729  
WBC 7.18  
HB 13.1  
HCT 41.8  
  
INR 1.0  
CREAT 1.35  
  
UA: Normal  
HCG:N/A  
  
ABO/ABO Confirmed: Yes  
  
Blood ordered: No  
  
SA Swab: Yes - results: Pending  
  
Last Dose of Anticoagulation: vitamins  
  
Op Note: N/A  
  
Pacemaker Check: N/A  
  
Consults: GI 2001  
  
DM: No  
  
Cardiac Surgical prep: No  
  
SIGNATURE: Krystal Castellanos RN CHECKED BY:  
DATE of SERVICE: 2011  
TIME of SERVICE: 2:23 PM  
  
  
   
   
                                Anemia of chronic disease 2011  
   
                                Intestinal polyp 2011  
   
                                                      
  
  
Overview:Formatting of this note might be different from the original.  
Distal ileum ulcerated polyp.  
   
   
                                Nonspecific abnormal results of liver function s  
nialldy 2006  
   
                                Impotence of organic origin 2006      09/0  
2015  
   
                                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  
                 2021  
  
documented as of this encounter (statuses as of 2023)  
[facility]06- History of Past illness Narrative*   
  
                                Problem         Noted Date      Resolved Date  
   
                                Medicare annual wellness visit, subsequent 2019  
   
                                Encounter for long-term (current) use of medicat  
ions 2018  
   
                                                      
  
  
Overview:Formatting of this note might be different from the original.  
Added automatically from request for surgery [id number]  
   
   
                                History of colonic polyps 2018  
   
                                                      
  
  
Overview:Formatting of this note might be different from the original.  
Added automatically from request for surgery [id number]  
   
   
                                Gastroesophageal reflux disease 2018  
   
                                                      
  
  
Overview:Formatting of this note might be different from the original.  
Added automatically from request for surgery [id number]  
   
   
                                Acute pulmonary edema 10/05/2011      10/06/2011  
   
                                                      
  
  
Overview:Formatting of this note might be different from the original.  
10/5/2011  
cardiogenic and noncardiogenic factors  
  
   
   
                                Atelectasis     10/05/2011      10/06/2011  
   
                                Hypotension     10/04/2011      10/06/2011  
   
                                                      
  
  
Overview:Formatting of this note might be different from the original.  
10/4/2011  
goal map 65-80  
   
   
                                Stress hyperglycemia 10/04/2011      10/06/2011  
   
                                                      
  
  
Overview:Formatting of this note might be different from the original.  
10/4/2011  
Perioperative insulin resistance and exacerbation of hyperglycemia.  
Control blood glucose with titration of insulin infusion  
  
10/5/2011  
adequate control  
goal FBG<180  
  
   
   
                                Post-op pain    10/04/2011      10/06/2011  
   
                                Mechanically assisted ventilation 10/04/2011      
  10/05/2011  
   
                                                      
  
  
Overview:Formatting of this note might be different from the original.  
10/4/2011  
optimize MV settings, WTE  
current setting:FiO2, 75%, peep 8  
   
   
                                Cardiac insufficiency 10/04/2011      10/05/2011  
   
                                                      
  
  
Overview:Formatting of this note might be different from the original.  
10/4/2011  
RV mild to moderate post pump  
goal CI>2.3  
   
   
                                Hypoxemia       10/04/2011      10/06/2011  
   
                                discharge planning 2011      10/19/2011  
   
                                                      
  
  
Overview:Formatting of this note might be different from the original.  
age 71,  lives in [place], [place]. No DC needs.  
/  
   
   
                                Pre-op testing  2011      10/04/2011  
   
                                                      
  
  
Overview:Formatting of this note is different from the original.  
Images from the original note were not included.  
[facility]  
PRE-OP CHECKLIST  
  
Surgeon: Deangelo Angulo M.D. Informed Consent Completed: No  
STS Score: 1.4%  
CAD: Yes - CAD on Problem List: Yes  
Is intended procedure a CABG: Yes - is a beta blocker ordered? Yes  
  
H & P completed: Yes  
  
PA/LAT: Completed CT: Completed MRI: N/A LE US: N/A  
  
Cath: Yes - reviewed: Yes Echo:Completed EKG: Completed EF %: 61  
  
PI's: N/A Carotid: Completed Mapping: N/A Dental: Completed PFT's: N/A  
  
Basename 11 0729  
WBC 7.18  
HB 13.1  
HCT 41.8  
  
INR 1.0  
CREAT 1.35  
  
UA: Normal  
HCG:N/A  
  
ABO/ABO Confirmed: Yes  
  
Blood ordered: No  
  
SA Swab: Yes - results: Pending  
  
Last Dose of Anticoagulation: vitamins  
  
Op Note: N/A  
  
Pacemaker Check: N/A  
  
Consults: GI 2001  
  
DM: No  
  
Cardiac Surgical prep: No  
  
SIGNATURE: Krystal Castellanos RN CHECKED BY:  
DATE of SERVICE: 2011  
TIME of SERVICE: 2:23 PM  
  
  
   
   
                                Anemia of chronic disease 2011  
   
                                Intestinal polyp 2011  
   
                                                      
  
  
Overview:Formatting of this note might be different from the original.  
Distal ileum ulcerated polyp.  
   
   
                                Nonspecific abnormal results of liver function s  
tudy 2006  
   
                                Impotence of organic origin 2006  
   
                                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  
                 2021  
  
documented as of this encounter (statuses as of 02/10/2023)  
[facility]06- History of Past illness Narrative*   
  
                                Problem         Noted Date      Resolved Date  
   
                                Medicare annual wellness visit, subsequent 2019  
   
                                Encounter for long-term (current) use of medicat  
ions 2018  
   
                                                      
  
  
Overview:Formatting of this note might be different from the original.  
Added automatically from request for surgery [id number]  
   
   
                                History of colonic polyps 2018  
   
                                                      
  
  
Overview:Formatting of this note might be different from the original.  
Added automatically from request for surgery [id number]  
   
   
                                Gastroesophageal reflux disease 2018  
   
                                                      
  
  
Overview:Formatting of this note might be different from the original.  
Added automatically from request for surgery [id number]  
   
   
                                Acute pulmonary edema 10/05/2011      10/06/2011  
   
                                                      
  
  
Overview:Formatting of this note might be different from the original.  
10/5/2011  
cardiogenic and noncardiogenic factors  
  
   
   
                                Atelectasis     10/05/2011      10/06/2011  
   
                                Hypotension     10/04/2011      10/06/2011  
   
                                                      
  
  
Overview:Formatting of this note might be different from the original.  
10/4/2011  
goal map 65-80  
   
   
                                Stress hyperglycemia 10/04/2011      10/06/2011  
   
                                                      
  
  
Overview:Formatting of this note might be different from the original.  
10/4/2011  
Perioperative insulin resistance and exacerbation of hyperglycemia.  
Control blood glucose with titration of insulin infusion  
  
10/5/2011  
adequate control  
goal FBG<180  
  
   
   
                                Post-op pain    10/04/2011      10/06/2011  
   
                                Mechanically assisted ventilation 10/04/2011      
  10/05/2011  
   
                                                      
  
  
Overview:Formatting of this note might be different from the original.  
10/4/2011  
optimize MV settings, WTE  
current setting:FiO2, 75%, peep 8  
   
   
                                Cardiac insufficiency 10/04/2011      10/05/2011  
   
                                                      
  
  
Overview:Formatting of this note might be different from the original.  
10/4/2011  
RV mild to moderate post pump  
goal CI>2.3  
   
   
                                Hypoxemia       10/04/2011      10/06/2011  
   
                                discharge planning 2011      10/19/2011  
   
                                                      
  
  
Overview:Formatting of this note might be different from the original.  
age 71,  lives in [place], [place]. No DC needs.  
/  
   
   
                                Pre-op testing  2011      10/04/2011  
   
                                                      
  
  
Overview:Formatting of this note is different from the original.  
Images from the original note were not included.  
[facility]  
PRE-OP CHECKLIST  
  
Surgeon: Deangelo Angulo M.D. Informed Consent Completed: No  
STS Score: 1.4%  
CAD: Yes - CAD on Problem List: Yes  
Is intended procedure a CABG: Yes - is a beta blocker ordered? Yes  
  
H & P completed: Yes  
  
PA/LAT: Completed CT: Completed MRI: N/A LE US: N/A  
  
Cath: Yes - reviewed: Yes Echo:Completed EKG: Completed EF %: 61  
  
PI's: N/A Carotid: Completed Mapping: N/A Dental: Completed PFT's: N/A  
  
Basename 11 0729  
WBC 7.18  
HB 13.1  
HCT 41.8  
  
INR 1.0  
CREAT 1.35  
  
UA: Normal  
HCG:N/A  
  
ABO/ABO Confirmed: Yes  
  
Blood ordered: No  
  
SA Swab: Yes - results: Pending  
  
Last Dose of Anticoagulation: vitamins  
  
Op Note: N/A  
  
Pacemaker Check: N/A  
  
Consults: GI 2001  
  
DM: No  
  
Cardiac Surgical prep: No  
  
SIGNATURE: Krystal Castellanos RN CHECKED BY:  
DATE of SERVICE: 2011  
TIME of SERVICE: 2:23 PM  
  
  
   
   
                                Anemia of chronic disease 2011  
   
                                Intestinal polyp 2011  
   
                                                      
  
  
Overview:Formatting of this note might be different from the original.  
Distal ileum ulcerated polyp.  
   
   
                                Nonspecific abnormal results of liver function s  
tudy 2006  
   
                                Impotence of organic origin 2006      09/0  
2015  
   
                                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  
                 2021  
  
documented as of this encounter (statuses as of 2023)  
[facility]06- History of Past illness Narrative*   
  
                                Problem         Noted Date      Resolved Date  
   
                                Medicare annual wellness visit, subsequent 2019  
   
                                Encounter for long-term (current) use of medicat  
ions 2018  
   
                                                      
  
  
Overview:Formatting of this note might be different from the original.  
Added automatically from request for surgery [id number]  
   
   
                                History of colonic polyps 2018  
   
                                                      
  
  
Overview:Formatting of this note might be different from the original.  
Added automatically from request for surgery [id number]  
   
   
                                Gastroesophageal reflux disease 2018  
   
                                                      
  
  
Overview:Formatting of this note might be different from the original.  
Added automatically from request for surgery [id number]  
   
   
                                Acute pulmonary edema 10/05/2011      10/06/2011  
   
                                                      
  
  
Overview:Formatting of this note might be different from the original.  
10/5/2011  
cardiogenic and noncardiogenic factors  
  
   
   
                                Atelectasis     10/05/2011      10/06/2011  
   
                                Hypotension     10/04/2011      10/06/2011  
   
                                                      
  
  
Overview:Formatting of this note might be different from the original.  
10/4/2011  
goal map 65-80  
   
   
                                Stress hyperglycemia 10/04/2011      10/06/2011  
   
                                                      
  
  
Overview:Formatting of this note might be different from the original.  
10/4/2011  
Perioperative insulin resistance and exacerbation of hyperglycemia.  
Control blood glucose with titration of insulin infusion  
  
10/5/2011  
adequate control  
goal FBG<180  
  
   
   
                                Post-op pain    10/04/2011      10/06/2011  
   
                                Mechanically assisted ventilation 10/04/2011      
  10/05/2011  
   
                                                      
  
  
Overview:Formatting of this note might be different from the original.  
10/4/2011  
optimize MV settings, WTE  
current setting:FiO2, 75%, peep 8  
   
   
                                Cardiac insufficiency 10/04/2011      10/05/2011  
   
                                                      
  
  
Overview:Formatting of this note might be different from the original.  
10/4/2011  
RV mild to moderate post pump  
goal CI>2.3  
   
   
                                Hypoxemia       10/04/2011      10/06/2011  
   
                                discharge planning 2011      10/19/2011  
   
                                                      
  
  
Overview:Formatting of this note might be different from the original.  
age 71,  lives in [place], [place]. No DC needs.  
/  
   
   
                                Pre-op testing  2011      10/04/2011  
   
                                                      
  
  
Overview:Formatting of this note is different from the original.  
Images from the original note were not included.  
[facility]  
PRE-OP CHECKLIST  
  
Surgeon: Deangelo Angulo M.D. Informed Consent Completed: No  
STS Score: 1.4%  
CAD: Yes - CAD on Problem List: Yes  
Is intended procedure a CABG: Yes - is a beta blocker ordered? Yes  
  
H & P completed: Yes  
  
PA/LAT: Completed CT: Completed MRI: N/A LE US: N/A  
  
Cath: Yes - reviewed: Yes Echo:Completed EKG: Completed EF %: 61  
  
PI's: N/A Carotid: Completed Mapping: N/A Dental: Completed PFT's: N/A  
  
Basename 11 0729  
WBC 7.18  
HB 13.1  
HCT 41.8  
  
INR 1.0  
CREAT 1.35  
  
UA: Normal  
HCG:N/A  
  
ABO/ABO Confirmed: Yes  
  
Blood ordered: No  
  
SA Swab: Yes - results: Pending  
  
Last Dose of Anticoagulation: vitamins  
  
Op Note: N/A  
  
Pacemaker Check: N/A  
  
Consults: GI 2001  
  
DM: No  
  
Cardiac Surgical prep: No  
  
SIGNATURE: Krystal Castellanos RN CHECKED BY:  
DATE of SERVICE: 2011  
TIME of SERVICE: 2:23 PM  
  
  
   
   
                                Anemia of chronic disease 2011  
   
                                Intestinal polyp 2011  
   
                                                      
  
  
Overview:Formatting of this note might be different from the original.  
Distal ileum ulcerated polyp.  
   
   
                                Nonspecific abnormal results of liver function s  
nialldy 2006  
   
                                Impotence of organic origin 2006  
   
                                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  
                 2021  
  
documented as of this encounter (statuses as of 2023)  
[facility]06- History of Past illness Narrative*   
  
                                Problem         Noted Date      Resolved Date  
   
                                Medicare annual wellness visit, subsequent 2019  
   
                                Encounter for long-term (current) use of medicat  
ions 2018  
   
                                                      
  
  
Overview:Formatting of this note might be different from the original.  
Added automatically from request for surgery [id number]  
   
   
                                History of colonic polyps 2018  
   
                                                      
  
  
Overview:Formatting of this note might be different from the original.  
Added automatically from request for surgery [id number]  
   
   
                                Gastroesophageal reflux disease 2018  
   
                                                      
  
  
Overview:Formatting of this note might be different from the original.  
Added automatically from request for surgery [id number]  
   
   
                                Acute pulmonary edema 10/05/2011      10/06/2011  
   
                                                      
  
  
Overview:Formatting of this note might be different from the original.  
10/5/2011  
cardiogenic and noncardiogenic factors  
  
   
   
                                Atelectasis     10/05/2011      10/06/2011  
   
                                Hypotension     10/04/2011      10/06/2011  
   
                                                      
  
  
Overview:Formatting of this note might be different from the original.  
10/4/2011  
goal map 65-80  
   
   
                                Stress hyperglycemia 10/04/2011      10/06/2011  
   
                                                      
  
  
Overview:Formatting of this note might be different from the original.  
10/4/2011  
Perioperative insulin resistance and exacerbation of hyperglycemia.  
Control blood glucose with titration of insulin infusion  
  
10/5/2011  
adequate control  
goal FBG<180  
  
   
   
                                Post-op pain    10/04/2011      10/06/2011  
   
                                Mechanically assisted ventilation 10/04/2011      
  10/05/2011  
   
                                                      
  
  
Overview:Formatting of this note might be different from the original.  
10/4/2011  
optimize MV settings, WTE  
current setting:FiO2, 75%, peep 8  
   
   
                                Cardiac insufficiency 10/04/2011      10/05/2011  
   
                                                      
  
  
Overview:Formatting of this note might be different from the original.  
10/4/2011  
RV mild to moderate post pump  
goal CI>2.3  
   
   
                                Hypoxemia       10/04/2011      10/06/2011  
   
                                discharge planning 2011      10/19/2011  
   
                                                      
  
  
Overview:Formatting of this note might be different from the original.  
age 71,  lives in [place], [place]. No DC needs.  
/  
   
   
                                Pre-op testing  2011      10/04/2011  
   
                                                      
  
  
Overview:Formatting of this note is different from the original.  
Images from the original note were not included.  
[facility]  
PRE-OP CHECKLIST  
  
Surgeon: Deangelo Angulo M.D. Informed Consent Completed: No  
STS Score: 1.4%  
CAD: Yes - CAD on Problem List: Yes  
Is intended procedure a CABG: Yes - is a beta blocker ordered? Yes  
  
H & P completed: Yes  
  
PA/LAT: Completed CT: Completed MRI: N/A LE US: N/A  
  
Cath: Yes - reviewed: Yes Echo:Completed EKG: Completed EF %: 61  
  
PI's: N/A Carotid: Completed Mapping: N/A Dental: Completed PFT's: N/A  
  
Basename 11 0729  
WBC 7.18  
HB 13.1  
HCT 41.8  
  
INR 1.0  
CREAT 1.35  
  
UA: Normal  
HCG:N/A  
  
ABO/ABO Confirmed: Yes  
  
Blood ordered: No  
  
SA Swab: Yes - results: Pending  
  
Last Dose of Anticoagulation: vitamins  
  
Op Note: N/A  
  
Pacemaker Check: N/A  
  
Consults: GI 2001  
  
DM: No  
  
Cardiac Surgical prep: No  
  
SIGNATURE: Krystal Castellanos RN CHECKED BY:  
DATE of SERVICE: 2011  
TIME of SERVICE: 2:23 PM  
  
  
   
   
                                Anemia of chronic disease 2011  
   
                                Intestinal polyp 2011  
   
                                                      
  
  
Overview:Formatting of this note might be different from the original.  
Distal ileum ulcerated polyp.  
   
   
                                Nonspecific abnormal results of liver function s  
tudy 2006  
   
                                Impotence of organic origin 2006  
   
                                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  
                 2021  
  
documented as of this encounter (statuses as of 2023)  
[facility]06- History of Past illness Narrative*   
  
                                Problem         Noted Date      Resolved Date  
   
                                Medicare annual wellness visit, subsequent 2019  
   
                                Encounter for long-term (current) use of medicat  
ions 2018  
   
                                                      
  
  
Overview:Formatting of this note might be different from the original.  
Added automatically from request for surgery [id number]  
   
   
                                History of colonic polyps 2018  
   
                                                      
  
  
Overview:Formatting of this note might be different from the original.  
Added automatically from request for surgery [id number]  
   
   
                                Gastroesophageal reflux disease 2018  
   
                                                      
  
  
Overview:Formatting of this note might be different from the original.  
Added automatically from request for surgery [id number]  
   
   
                                Acute pulmonary edema 10/05/2011      10/06/2011  
   
                                                      
  
  
Overview:Formatting of this note might be different from the original.  
10/5/2011  
cardiogenic and noncardiogenic factors  
  
   
   
                                Atelectasis     10/05/2011      10/06/2011  
   
                                Hypotension     10/04/2011      10/06/2011  
   
                                                      
  
  
Overview:Formatting of this note might be different from the original.  
10/4/2011  
goal map 65-80  
   
   
                                Stress hyperglycemia 10/04/2011      10/06/2011  
   
                                                      
  
  
Overview:Formatting of this note might be different from the original.  
10/4/2011  
Perioperative insulin resistance and exacerbation of hyperglycemia.  
Control blood glucose with titration of insulin infusion  
  
10/5/2011  
adequate control  
goal FBG<180  
  
   
   
                                Post-op pain    10/04/2011      10/06/2011  
   
                                Mechanically assisted ventilation 10/04/2011      
  10/05/2011  
   
                                                      
  
  
Overview:Formatting of this note might be different from the original.  
10/4/2011  
optimize MV settings, WTE  
current setting:FiO2, 75%, peep 8  
   
   
                                Cardiac insufficiency 10/04/2011      10/05/2011  
   
                                                      
  
  
Overview:Formatting of this note might be different from the original.  
10/4/2011  
RV mild to moderate post pump  
goal CI>2.3  
   
   
                                Hypoxemia       10/04/2011      10/06/2011  
   
                                discharge planning 2011      10/19/2011  
   
                                                      
  
  
Overview:Formatting of this note might be different from the original.  
age 71,  lives in [place], [place]. No DC needs.  
/  
   
   
                                Pre-op testing  2011      10/04/2011  
   
                                                      
  
  
Overview:Formatting of this note is different from the original.  
Images from the original note were not included.  
[facility]  
PRE-OP CHECKLIST  
  
Surgeon: Deangelo Angulo M.D. Informed Consent Completed: No  
STS Score: 1.4%  
CAD: Yes - CAD on Problem List: Yes  
Is intended procedure a CABG: Yes - is a beta blocker ordered? Yes  
  
H & P completed: Yes  
  
PA/LAT: Completed CT: Completed MRI: N/A LE US: N/A  
  
Cath: Yes - reviewed: Yes Echo:Completed EKG: Completed EF %: 61  
  
PI's: N/A Carotid: Completed Mapping: N/A Dental: Completed PFT's: N/A  
  
Basename 11 0729  
WBC 7.18  
HB 13.1  
HCT 41.8  
  
INR 1.0  
CREAT 1.35  
  
UA: Normal  
HCG:N/A  
  
ABO/ABO Confirmed: Yes  
  
Blood ordered: No  
  
SA Swab: Yes - results: Pending  
  
Last Dose of Anticoagulation: vitamins  
  
Op Note: N/A  
  
Pacemaker Check: N/A  
  
Consults: GI 2001  
  
DM: No  
  
Cardiac Surgical prep: No  
  
SIGNATURE: Krystal Castellanos RN CHECKED BY:  
DATE of SERVICE: 2011  
TIME of SERVICE: 2:23 PM  
  
  
   
   
                                Anemia of chronic disease 2011  
   
                                Intestinal polyp 2011  
   
                                                      
  
  
Overview:Formatting of this note might be different from the original.  
Distal ileum ulcerated polyp.  
   
   
                                Nonspecific abnormal results of liver function s  
tudy 2006  
   
                                Impotence of organic origin 2006  
   
                                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  
                 2021  
  
documented as of this encounter (statuses as of 2023)  
[facility]06- History of Past illness Narrative*   
  
                                Problem         Noted Date      Resolved Date  
   
                                Medicare annual wellness visit, subsequent 2019  
   
                                Encounter for long-term (current) use of medicat  
ions 2018  
   
                                                      
  
  
Overview:Formatting of this note might be different from the original.  
Added automatically from request for surgery [id number]  
   
   
                                History of colonic polyps 2018  
   
                                                      
  
  
Overview:Formatting of this note might be different from the original.  
Added automatically from request for surgery [id number]  
   
   
                                Gastroesophageal reflux disease 2018  
   
                                                      
  
  
Overview:Formatting of this note might be different from the original.  
Added automatically from request for surgery [id number]  
   
   
                                Acute pulmonary edema 10/05/2011      10/06/2011  
   
                                                      
  
  
Overview:Formatting of this note might be different from the original.  
10/5/2011  
cardiogenic and noncardiogenic factors  
  
   
   
                                Atelectasis     10/05/2011      10/06/2011  
   
                                Hypotension     10/04/2011      10/06/2011  
   
                                                      
  
  
Overview:Formatting of this note might be different from the original.  
10/4/2011  
goal map 65-80  
   
   
                                Stress hyperglycemia 10/04/2011      10/06/2011  
   
                                                      
  
  
Overview:Formatting of this note might be different from the original.  
10/4/2011  
Perioperative insulin resistance and exacerbation of hyperglycemia.  
Control blood glucose with titration of insulin infusion  
  
10/5/2011  
adequate control  
goal FBG<180  
  
   
   
                                Post-op pain    10/04/2011      10/06/2011  
   
                                Mechanically assisted ventilation 10/04/2011      
  10/05/2011  
   
                                                      
  
  
Overview:Formatting of this note might be different from the original.  
10/4/2011  
optimize MV settings, WTE  
current setting:FiO2, 75%, peep 8  
   
   
                                Cardiac insufficiency 10/04/2011      10/05/2011  
   
                                                      
  
  
Overview:Formatting of this note might be different from the original.  
10/4/2011  
RV mild to moderate post pump  
goal CI>2.3  
   
   
                                Hypoxemia       10/04/2011      10/06/2011  
   
                                discharge planning 2011      10/19/2011  
   
                                                      
  
  
Overview:Formatting of this note might be different from the original.  
age 71,  lives in [place], [place]. No DC needs.  
/  
   
   
                                Pre-op testing  2011      10/04/2011  
   
                                                      
  
  
Overview:Formatting of this note is different from the original.  
Images from the original note were not included.  
[facility]  
PRE-OP CHECKLIST  
  
Surgeon: Deangelo Angulo M.D. Informed Consent Completed: No  
STS Score: 1.4%  
CAD: Yes - CAD on Problem List: Yes  
Is intended procedure a CABG: Yes - is a beta blocker ordered? Yes  
  
H & P completed: Yes  
  
PA/LAT: Completed CT: Completed MRI: N/A LE US: N/A  
  
Cath: Yes - reviewed: Yes Echo:Completed EKG: Completed EF %: 61  
  
PI's: N/A Carotid: Completed Mapping: N/A Dental: Completed PFT's: N/A  
  
Basename 11 0729  
WBC 7.18  
HB 13.1  
HCT 41.8  
  
INR 1.0  
CREAT 1.35  
  
UA: Normal  
HCG:N/A  
  
ABO/ABO Confirmed: Yes  
  
Blood ordered: No  
  
SA Swab: Yes - results: Pending  
  
Last Dose of Anticoagulation: vitamins  
  
Op Note: N/A  
  
Pacemaker Check: N/A  
  
Consults: GI 2001  
  
DM: No  
  
Cardiac Surgical prep: No  
  
SIGNATURE: Krystal Castellanos RN CHECKED BY:  
DATE of SERVICE: 2011  
TIME of SERVICE: 2:23 PM  
  
  
   
   
                                Anemia of chronic disease 2011  
   
                                Intestinal polyp 2011  
   
                                                      
  
  
Overview:Formatting of this note might be different from the original.  
Distal ileum ulcerated polyp.  
   
   
                                Nonspecific abnormal results of liver function s  
tudy 2006  
   
                                Impotence of organic origin 2006  
   
                                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  
                 2021  
  
documented as of this encounter (statuses as of 2023)  
[facility]06- History of Past illness Narrative*   
  
                                Problem         Noted Date      Resolved Date  
   
                                Medicare annual wellness visit, subsequent 2019  
   
                                Encounter for long-term (current) use of medicat  
ions 2018  
   
                                                      
  
  
Overview:Formatting of this note might be different from the original.  
Added automatically from request for surgery [id number]  
   
   
                                History of colonic polyps 2018  
   
                                                      
  
  
Overview:Formatting of this note might be different from the original.  
Added automatically from request for surgery [id number]  
   
   
                                Gastroesophageal reflux disease 2018  
   
                                                      
  
  
Overview:Formatting of this note might be different from the original.  
Added automatically from request for surgery [id number]  
   
   
                                Acute pulmonary edema 10/05/2011      10/06/2011  
   
                                                      
  
  
Overview:Formatting of this note might be different from the original.  
10/5/2011  
cardiogenic and noncardiogenic factors  
  
   
   
                                Atelectasis     10/05/2011      10/06/2011  
   
                                Hypotension     10/04/2011      10/06/2011  
   
                                                      
  
  
Overview:Formatting of this note might be different from the original.  
10/4/2011  
goal map 65-80  
   
   
                                Stress hyperglycemia 10/04/2011      10/06/2011  
   
                                                      
  
  
Overview:Formatting of this note might be different from the original.  
10/4/2011  
Perioperative insulin resistance and exacerbation of hyperglycemia.  
Control blood glucose with titration of insulin infusion  
  
10/5/2011  
adequate control  
goal FBG<180  
  
   
   
                                Post-op pain    10/04/2011      10/06/2011  
   
                                Mechanically assisted ventilation 10/04/2011      
  10/05/2011  
   
                                                      
  
  
Overview:Formatting of this note might be different from the original.  
10/4/2011  
optimize MV settings, WTE  
current setting:FiO2, 75%, peep 8  
   
   
                                Cardiac insufficiency 10/04/2011      10/05/2011  
   
                                                      
  
  
Overview:Formatting of this note might be different from the original.  
10/4/2011  
RV mild to moderate post pump  
goal CI>2.3  
   
   
                                Hypoxemia       10/04/2011      10/06/2011  
   
                                discharge planning 2011      10/19/2011  
   
                                                      
  
  
Overview:Formatting of this note might be different from the original.  
age 71,  lives in [place], [place]. No DC needs.  
/  
   
   
                                Pre-op testing  2011      10/04/2011  
   
                                                      
  
  
Overview:Formatting of this note is different from the original.  
Images from the original note were not included.  
[facility]  
PRE-OP CHECKLIST  
  
Surgeon: Deangelo Angulo M.D. Informed Consent Completed: No  
STS Score: 1.4%  
CAD: Yes - CAD on Problem List: Yes  
Is intended procedure a CABG: Yes - is a beta blocker ordered? Yes  
  
H & P completed: Yes  
  
PA/LAT: Completed CT: Completed MRI: N/A LE US: N/A  
  
Cath: Yes - reviewed: Yes Echo:Completed EKG: Completed EF %: 61  
  
PI's: N/A Carotid: Completed Mapping: N/A Dental: Completed PFT's: N/A  
  
Basename 11 0729  
WBC 7.18  
HB 13.1  
HCT 41.8  
  
INR 1.0  
CREAT 1.35  
  
UA: Normal  
HCG:N/A  
  
ABO/ABO Confirmed: Yes  
  
Blood ordered: No  
  
SA Swab: Yes - results: Pending  
  
Last Dose of Anticoagulation: vitamins  
  
Op Note: N/A  
  
Pacemaker Check: N/A  
  
Consults: GI 2001  
  
DM: No  
  
Cardiac Surgical prep: No  
  
SIGNATURE: Krystal Castellanos RN CHECKED BY:  
DATE of SERVICE: 2011  
TIME of SERVICE: 2:23 PM  
  
  
   
   
                                Anemia of chronic disease 2011  
   
                                Intestinal polyp 2011  
   
                                                      
  
  
Overview:Formatting of this note might be different from the original.  
Distal ileum ulcerated polyp.  
   
   
                                Nonspecific abnormal results of liver function s  
nialldy 2006  
   
                                Impotence of organic origin 2006  
   
                                Obesity, Class III, BMI 40-49.9 (morbid obesity)  
                 2021  
  
documented as of this encounter (statuses as of 03/10/2023)  
[facility]06- History of Past illness Narrative*   
  
                                Problem         Noted Date      Resolved Date  
   
                                Medicare annual wellness visit, subsequent 2019  
   
                                Encounter for long-term (current) use of medicat  
ions 2018  
   
                                                      
  
  
Overview:Formatting of this note might be different from the original.  
Added automatically from request for surgery [id number]  
   
   
                                History of colonic polyps 2018  
   
                                                      
  
  
Overview:Formatting of this note might be different from the original.  
Added automatically from request for surgery [id number]  
   
   
                                Gastroesophageal reflux disease 2018  
   
                                                      
  
  
Overview:Formatting of this note might be different from the original.  
Added automatically from request for surgery [id number]  
   
   
                                Acute pulmonary edema 10/05/2011      10/06/2011  
   
                                                      
  
  
Overview:Formatting of this note might be different from the original.  
10/5/2011  
cardiogenic and noncardiogenic factors  
  
   
   
                                Atelectasis     10/05/2011      10/06/2011  
   
                                Hypotension     10/04/2011      10/06/2011  
   
                                                      
  
  
Overview:Formatting of this note might be different from the original.  
10/4/2011  
goal map 65-80  
   
   
                                Stress hyperglycemia 10/04/2011      10/06/2011  
   
                                                      
  
  
Overview:Formatting of this note might be different from the original.  
10/4/2011  
Perioperative insulin resistance and exacerbation of hyperglycemia.  
Control blood glucose with titration of insulin infusion  
  
10/5/2011  
adequate control  
goal FBG<180  
  
   
   
                                Post-op pain    10/04/2011      10/06/2011  
   
                                Mechanically assisted ventilation 10/04/2011      
  10/05/2011  
   
                                                      
  
  
Overview:Formatting of this note might be different from the original.  
10/4/2011  
optimize MV settings, WTE  
current setting:FiO2, 75%, peep 8  
   
   
                                Cardiac insufficiency 10/04/2011      10/05/2011  
   
                                                      
  
  
Overview:Formatting of this note might be different from the original.  
10/4/2011  
RV mild to moderate post pump  
goal CI>2.3  
   
   
                                Hypoxemia       10/04/2011      10/06/2011  
   
                                discharge planning 2011      10/19/2011  
   
                                                      
  
  
Overview:Formatting of this note might be different from the original.  
age 71,  lives in [place], [place]. No DC needs.  
/  
   
   
                                Pre-op testing  2011      10/04/2011  
   
                                                      
  
  
Overview:Formatting of this note is different from the original.  
Images from the original note were not included.  
[facility]  
PRE-OP CHECKLIST  
  
Surgeon: Deangelo Angulo M.D. Informed Consent Completed: No  
STS Score: 1.4%  
CAD: Yes - CAD on Problem List: Yes  
Is intended procedure a CABG: Yes - is a beta blocker ordered? Yes  
  
H & P completed: Yes  
  
PA/LAT: Completed CT: Completed MRI: N/A LE US: N/A  
  
Cath: Yes - reviewed: Yes Echo:Completed EKG: Completed EF %: 61  
  
PI's: N/A Carotid: Completed Mapping: N/A Dental: Completed PFT's: N/A  
  
Basename 11 0729  
WBC 7.18  
HB 13.1  
HCT 41.8  
  
INR 1.0  
CREAT 1.35  
  
UA: Normal  
HCG:N/A  
  
ABO/ABO Confirmed: Yes  
  
Blood ordered: No  
  
SA Swab: Yes - results: Pending  
  
Last Dose of Anticoagulation: vitamins  
  
Op Note: N/A  
  
Pacemaker Check: N/A  
  
Consults: GI 2001  
  
DM: No  
  
Cardiac Surgical prep: No  
  
SIGNATURE: Krystal Castellanos RN CHECKED BY:  
DATE of SERVICE: 2011  
TIME of SERVICE: 2:23 PM  
  
  
   
   
                                Anemia of chronic disease 2011  
   
                                Intestinal polyp 2011  
   
                                                      
  
  
Overview:Formatting of this note might be different from the original.  
Distal ileum ulcerated polyp.  
   
   
                                Nonspecific abnormal results of liver function s  
tudy 2006  
   
                                Impotence of organic origin 2006  
   
                                Obesity, Class III, BMI 40-49.9 (morbid obesity)  
                 2021  
  
documented as of this encounter (statuses as of 2023)  
[facility]06- History of Past illness Narrative*   
  
                                Problem         Noted Date      Resolved Date  
   
                                Medicare annual wellness visit, subsequent 2019  
   
                                Encounter for long-term (current) use of medicat  
ions 2018  
   
                                                      
  
  
Overview:Formatting of this note might be different from the original.  
Added automatically from request for surgery [id number]  
   
   
                                History of colonic polyps 2018  
   
                                                      
  
  
Overview:Formatting of this note might be different from the original.  
Added automatically from request for surgery [id number]  
   
   
                                Gastroesophageal reflux disease 2018  
   
                                                      
  
  
Overview:Formatting of this note might be different from the original.  
Added automatically from request for surgery [id number]  
   
   
                                Acute pulmonary edema 10/05/2011      10/06/2011  
   
                                                      
  
  
Overview:Formatting of this note might be different from the original.  
10/5/2011  
cardiogenic and noncardiogenic factors  
  
   
   
                                Atelectasis     10/05/2011      10/06/2011  
   
                                Hypotension     10/04/2011      10/06/2011  
   
                                                      
  
  
Overview:Formatting of this note might be different from the original.  
10/4/2011  
goal map 65-80  
   
   
                                Stress hyperglycemia 10/04/2011      10/06/2011  
   
                                                      
  
  
Overview:Formatting of this note might be different from the original.  
10/4/2011  
Perioperative insulin resistance and exacerbation of hyperglycemia.  
Control blood glucose with titration of insulin infusion  
  
10/5/2011  
adequate control  
goal FBG<180  
  
   
   
                                Post-op pain    10/04/2011      10/06/2011  
   
                                Mechanically assisted ventilation 10/04/2011      
  10/05/2011  
   
                                                      
  
  
Overview:Formatting of this note might be different from the original.  
10/4/2011  
optimize MV settings, WTE  
current setting:FiO2, 75%, peep 8  
   
   
                                Cardiac insufficiency 10/04/2011      10/05/2011  
   
                                                      
  
  
Overview:Formatting of this note might be different from the original.  
10/4/2011  
RV mild to moderate post pump  
goal CI>2.3  
   
   
                                Hypoxemia       10/04/2011      10/06/2011  
   
                                discharge planning 2011      10/19/2011  
   
                                                      
  
  
Overview:Formatting of this note might be different from the original.  
age 71,  lives in [place], [place]. No DC needs.  
/  
   
   
                                Pre-op testing  2011      10/04/2011  
   
                                                      
  
  
Overview:Formatting of this note is different from the original.  
Images from the original note were not included.  
[facility]  
PRE-OP CHECKLIST  
  
Surgeon: Deangelo Angulo M.D. Informed Consent Completed: No  
STS Score: 1.4%  
CAD: Yes - CAD on Problem List: Yes  
Is intended procedure a CABG: Yes - is a beta blocker ordered? Yes  
  
H & P completed: Yes  
  
PA/LAT: Completed CT: Completed MRI: N/A LE US: N/A  
  
Cath: Yes - reviewed: Yes Echo:Completed EKG: Completed EF %: 61  
  
PI's: N/A Carotid: Completed Mapping: N/A Dental: Completed PFT's: N/A  
  
Basename 11 0729  
WBC 7.18  
HB 13.1  
HCT 41.8  
  
INR 1.0  
CREAT 1.35  
  
UA: Normal  
HCG:N/A  
  
ABO/ABO Confirmed: Yes  
  
Blood ordered: No  
  
SA Swab: Yes - results: Pending  
  
Last Dose of Anticoagulation: vitamins  
  
Op Note: N/A  
  
Pacemaker Check: N/A  
  
Consults: GI 2001  
  
DM: No  
  
Cardiac Surgical prep: No  
  
SIGNATURE: Krystal Castellanos RN CHECKED BY:  
DATE of SERVICE: 2011  
TIME of SERVICE: 2:23 PM  
  
  
   
   
                                Anemia of chronic disease 2011  
   
                                Intestinal polyp 2011  
   
                                                      
  
  
Overview:Formatting of this note might be different from the original.  
Distal ileum ulcerated polyp.  
   
   
                                Nonspecific abnormal results of liver function s  
tudy 2006  
   
                                Impotence of organic origin 2006  
   
                                Obesity, Class III, BMI 40-49.9 (morbid obesity)  
                 2021  
  
documented as of this encounter (statuses as of 2023)  
[facility]06- History of Past illness Narrative*   
  
                                Problem         Noted Date      Resolved Date  
   
                                Medicare annual wellness visit, subsequent 2019  
   
                                Encounter for long-term (current) use of medicat  
ions 2018  
   
                                                      
  
  
Overview:Formatting of this note might be different from the original.  
Added automatically from request for surgery [id number]  
   
   
                                History of colonic polyps 2018  
   
                                                      
  
  
Overview:Formatting of this note might be different from the original.  
Added automatically from request for surgery [id number]  
   
   
                                Gastroesophageal reflux disease 2018  
   
                                                      
  
  
Overview:Formatting of this note might be different from the original.  
Added automatically from request for surgery [id number]  
   
   
                                Acute pulmonary edema 10/05/2011      10/06/2011  
   
                                                      
  
  
Overview:Formatting of this note might be different from the original.  
10/5/2011  
cardiogenic and noncardiogenic factors  
  
   
   
                                Atelectasis     10/05/2011      10/06/2011  
   
                                Hypotension     10/04/2011      10/06/2011  
   
                                                      
  
  
Overview:Formatting of this note might be different from the original.  
10/4/2011  
goal map 65-80  
   
   
                                Stress hyperglycemia 10/04/2011      10/06/2011  
   
                                                      
  
  
Overview:Formatting of this note might be different from the original.  
10/4/2011  
Perioperative insulin resistance and exacerbation of hyperglycemia.  
Control blood glucose with titration of insulin infusion  
  
10/5/2011  
adequate control  
goal FBG<180  
  
   
   
                                Post-op pain    10/04/2011      10/06/2011  
   
                                Mechanically assisted ventilation 10/04/2011      
  10/05/2011  
   
                                                      
  
  
Overview:Formatting of this note might be different from the original.  
10/4/2011  
optimize MV settings, WTE  
current setting:FiO2, 75%, peep 8  
   
   
                                Cardiac insufficiency 10/04/2011      10/05/2011  
   
                                                      
  
  
Overview:Formatting of this note might be different from the original.  
10/4/2011  
RV mild to moderate post pump  
goal CI>2.3  
   
   
                                Hypoxemia       10/04/2011      10/06/2011  
   
                                discharge planning 2011      10/19/2011  
   
                                                      
  
  
Overview:Formatting of this note might be different from the original.  
age 71,  lives in [place], [place]. No DC needs.  
/  
   
   
                                Pre-op testing  2011      10/04/2011  
   
                                                      
  
  
Overview:Formatting of this note is different from the original.  
Images from the original note were not included.  
[facility]  
PRE-OP CHECKLIST  
  
Surgeon: Deangelo Angulo M.D. Informed Consent Completed: No  
STS Score: 1.4%  
CAD: Yes - CAD on Problem List: Yes  
Is intended procedure a CABG: Yes - is a beta blocker ordered? Yes  
  
H & P completed: Yes  
  
PA/LAT: Completed CT: Completed MRI: N/A LE US: N/A  
  
Cath: Yes - reviewed: Yes Echo:Completed EKG: Completed EF %: 61  
  
PI's: N/A Carotid: Completed Mapping: N/A Dental: Completed PFT's: N/A  
  
Basename 11 0729  
WBC 7.18  
HB 13.1  
HCT 41.8  
  
INR 1.0  
CREAT 1.35  
  
UA: Normal  
HCG:N/A  
  
ABO/ABO Confirmed: Yes  
  
Blood ordered: No  
  
SA Swab: Yes - results: Pending  
  
Last Dose of Anticoagulation: vitamins  
  
Op Note: N/A  
  
Pacemaker Check: N/A  
  
Consults: GI 2001  
  
DM: No  
  
Cardiac Surgical prep: No  
  
SIGNATURE: Krystal Castellanos RN CHECKED BY:  
DATE of SERVICE: 2011  
TIME of SERVICE: 2:23 PM  
  
  
   
   
                                Anemia of chronic disease 2011  
   
                                Intestinal polyp 2011  
   
                                                      
  
  
Overview:Formatting of this note might be different from the original.  
Distal ileum ulcerated polyp.  
   
   
                                Nonspecific abnormal results of liver function s  
tudy 2006  
   
                                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  
   
                                Obesity, Class III, BMI 40-49.9 (morbid obesity)  
                 2021  
  
documented as of this encounter (statuses as of 2023)  
[facility]06- History of Past illness Narrative*   
  
                                Problem         Noted Date      Resolved Date  
   
                                Medicare annual wellness visit, subsequent 2019  
   
                                Encounter for long-term (current) use of medicat  
ions 2018  
   
                                                      
  
  
Overview:Formatting of this note might be different from the original.  
Added automatically from request for surgery [id number]  
   
   
                                History of colonic polyps 2018  
   
                                                      
  
  
Overview:Formatting of this note might be different from the original.  
Added automatically from request for surgery [id number]  
   
   
                                Gastroesophageal reflux disease 2018  
   
                                                      
  
  
Overview:Formatting of this note might be different from the original.  
Added automatically from request for surgery [id number]  
   
   
                                Acute pulmonary edema 10/05/2011      10/06/2011  
   
                                                      
  
  
Overview:Formatting of this note might be different from the original.  
10/5/2011  
cardiogenic and noncardiogenic factors  
  
   
   
                                Atelectasis     10/05/2011      10/06/2011  
   
                                Hypotension     10/04/2011      10/06/2011  
   
                                                      
  
  
Overview:Formatting of this note might be different from the original.  
10/4/2011  
goal map 65-80  
   
   
                                Stress hyperglycemia 10/04/2011      10/06/2011  
   
                                                      
  
  
Overview:Formatting of this note might be different from the original.  
10/4/2011  
Perioperative insulin resistance and exacerbation of hyperglycemia.  
Control blood glucose with titration of insulin infusion  
  
10/5/2011  
adequate control  
goal FBG<180  
  
   
   
                                Post-op pain    10/04/2011      10/06/2011  
   
                                Mechanically assisted ventilation 10/04/2011      
  10/05/2011  
   
                                                      
  
  
Overview:Formatting of this note might be different from the original.  
10/4/2011  
optimize MV settings, WTE  
current setting:FiO2, 75%, peep 8  
   
   
                                Cardiac insufficiency 10/04/2011      10/05/2011  
   
                                                      
  
  
Overview:Formatting of this note might be different from the original.  
10/4/2011  
RV mild to moderate post pump  
goal CI>2.3  
   
   
                                Hypoxemia       10/04/2011      10/06/2011  
   
                                discharge planning 2011      10/19/2011  
   
                                                      
  
  
Overview:Formatting of this note might be different from the original.  
age 71,  lives in [place], [place]. No DC needs.  
/  
   
   
                                Pre-op testing  2011      10/04/2011  
   
                                                      
  
  
Overview:Formatting of this note is different from the original.  
Images from the original note were not included.  
[facility]  
PRE-OP CHECKLIST  
  
Surgeon: Deangelo Angulo M.D. Informed Consent Completed: No  
STS Score: 1.4%  
CAD: Yes - CAD on Problem List: Yes  
Is intended procedure a CABG: Yes - is a beta blocker ordered? Yes  
  
H & P completed: Yes  
  
PA/LAT: Completed CT: Completed MRI: N/A LE US: N/A  
  
Cath: Yes - reviewed: Yes Echo:Completed EKG: Completed EF %: 61  
  
PI's: N/A Carotid: Completed Mapping: N/A Dental: Completed PFT's: N/A  
  
Basename 11 0729  
WBC 7.18  
HB 13.1  
HCT 41.8  
  
INR 1.0  
CREAT 1.35  
  
UA: Normal  
HCG:N/A  
  
ABO/ABO Confirmed: Yes  
  
Blood ordered: No  
  
SA Swab: Yes - results: Pending  
  
Last Dose of Anticoagulation: vitamins  
  
Op Note: N/A  
  
Pacemaker Check: N/A  
  
Consults: GI 2001  
  
DM: No  
  
Cardiac Surgical prep: No  
  
SIGNATURE: Krystal Castellanos RN CHECKED BY:  
DATE of SERVICE: 2011  
TIME of SERVICE: 2:23 PM  
  
  
   
   
                                Anemia of chronic disease 2011  
   
                                Intestinal polyp 2011  
   
                                                      
  
  
Overview:Formatting of this note might be different from the original.  
Distal ileum ulcerated polyp.  
   
   
                                Nonspecific abnormal results of liver function s  
pepe 2006  
   
                                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  
   
                                Obesity, Class III, BMI 40-49.9 (morbid obesity)  
                 2021  
  
documented as of this encounter (statuses as of 2023)  
[facility]06- History of Past illness Narrative*   
  
                                Problem         Noted Date      Resolved Date  
   
                                Medicare annual wellness visit, subsequent 2019  
   
                                Encounter for long-term (current) use of medicat  
ions 2018  
   
                                                      
  
  
Overview:Formatting of this note might be different from the original.  
Added automatically from request for surgery [id number]  
   
   
                                History of colonic polyps 2018  
   
                                                      
  
  
Overview:Formatting of this note might be different from the original.  
Added automatically from request for surgery [id number]  
   
   
                                Gastroesophageal reflux disease 2018  
   
                                                      
  
  
Overview:Formatting of this note might be different from the original.  
Added automatically from request for surgery [id number]  
   
   
                                Acute pulmonary edema 10/05/2011      10/06/2011  
   
                                                      
  
  
Overview:Formatting of this note might be different from the original.  
10/5/2011  
cardiogenic and noncardiogenic factors  
  
   
   
                                Atelectasis     10/05/2011      10/06/2011  
   
                                Hypotension     10/04/2011      10/06/2011  
   
                                                      
  
  
Overview:Formatting of this note might be different from the original.  
10/4/2011  
goal map 65-80  
   
   
                                Stress hyperglycemia 10/04/2011      10/06/2011  
   
                                                      
  
  
Overview:Formatting of this note might be different from the original.  
10/4/2011  
Perioperative insulin resistance and exacerbation of hyperglycemia.  
Control blood glucose with titration of insulin infusion  
  
10/5/2011  
adequate control  
goal FBG<180  
  
   
   
                                Post-op pain    10/04/2011      10/06/2011  
   
                                Mechanically assisted ventilation 10/04/2011      
  10/05/2011  
   
                                                      
  
  
Overview:Formatting of this note might be different from the original.  
10/4/2011  
optimize MV settings, WTE  
current setting:FiO2, 75%, peep 8  
   
   
                                Cardiac insufficiency 10/04/2011      10/05/2011  
   
                                                      
  
  
Overview:Formatting of this note might be different from the original.  
10/4/2011  
RV mild to moderate post pump  
goal CI>2.3  
   
   
                                Hypoxemia       10/04/2011      10/06/2011  
   
                                discharge planning 2011      10/19/2011  
   
                                                      
  
  
Overview:Formatting of this note might be different from the original.  
age 71,  lives in [place], [place]. No DC needs.  
/  
   
   
                                Pre-op testing  2011      10/04/2011  
   
                                                      
  
  
Overview:Formatting of this note is different from the original.  
Images from the original note were not included.  
[facility]  
PRE-OP CHECKLIST  
  
Surgeon: Deangelo Angulo M.D. Informed Consent Completed: No  
STS Score: 1.4%  
CAD: Yes - CAD on Problem List: Yes  
Is intended procedure a CABG: Yes - is a beta blocker ordered? Yes  
  
H & P completed: Yes  
  
PA/LAT: Completed CT: Completed MRI: N/A LE US: N/A  
  
Cath: Yes - reviewed: Yes Echo:Completed EKG: Completed EF %: 61  
  
PI's: N/A Carotid: Completed Mapping: N/A Dental: Completed PFT's: N/A  
  
Basename 11 0729  
WBC 7.18  
HB 13.1  
HCT 41.8  
  
INR 1.0  
CREAT 1.35  
  
UA: Normal  
HCG:N/A  
  
ABO/ABO Confirmed: Yes  
  
Blood ordered: No  
  
SA Swab: Yes - results: Pending  
  
Last Dose of Anticoagulation: vitamins  
  
Op Note: N/A  
  
Pacemaker Check: N/A  
  
Consults: GI 2001  
  
DM: No  
  
Cardiac Surgical prep: No  
  
SIGNATURE: Krystal Castellanos RN CHECKED BY:  
DATE of SERVICE: 2011  
TIME of SERVICE: 2:23 PM  
  
  
   
   
                                Anemia of chronic disease 2011  
   
                                Intestinal polyp 2011  
   
                                                      
  
  
Overview:Formatting of this note might be different from the original.  
Distal ileum ulcerated polyp.  
   
   
                                Nonspecific abnormal results of liver function s  
tudy 2006  
   
                                Impotence of organic origin 2006  
   
                                Obesity, Class III, BMI 40-49.9 (morbid obesity)  
                 2021  
  
documented as of this encounter (statuses as of 2023)  
[facility]06- History of Past illness Narrative*   
  
                                Problem         Noted Date      Resolved Date  
   
                                Medicare annual wellness visit, subsequent 2019  
   
                                Encounter for long-term (current) use of medicat  
ions 2018  
   
                                                      
  
  
Overview:Formatting of this note might be different from the original.  
Added automatically from request for surgery [id number]  
   
   
                                History of colonic polyps 2018  
   
                                                      
  
  
Overview:Formatting of this note might be different from the original.  
Added automatically from request for surgery [id number]  
   
   
                                Gastroesophageal reflux disease 2018  
   
                                                      
  
  
Overview:Formatting of this note might be different from the original.  
Added automatically from request for surgery [id number]  
   
   
                                Acute pulmonary edema 10/05/2011      10/06/2011  
   
                                                      
  
  
Overview:Formatting of this note might be different from the original.  
10/5/2011  
cardiogenic and noncardiogenic factors  
  
   
   
                                Atelectasis     10/05/2011      10/06/2011  
   
                                Hypotension     10/04/2011      10/06/2011  
   
                                                      
  
  
Overview:Formatting of this note might be different from the original.  
10/4/2011  
goal map 65-80  
   
   
                                Stress hyperglycemia 10/04/2011      10/06/2011  
   
                                                      
  
  
Overview:Formatting of this note might be different from the original.  
10/4/2011  
Perioperative insulin resistance and exacerbation of hyperglycemia.  
Control blood glucose with titration of insulin infusion  
  
10/5/2011  
adequate control  
goal FBG<180  
  
   
   
                                Post-op pain    10/04/2011      10/06/2011  
   
                                Mechanically assisted ventilation 10/04/2011      
  10/05/2011  
   
                                                      
  
  
Overview:Formatting of this note might be different from the original.  
10/4/2011  
optimize MV settings, WTE  
current setting:FiO2, 75%, peep 8  
   
   
                                Cardiac insufficiency 10/04/2011      10/05/2011  
   
                                                      
  
  
Overview:Formatting of this note might be different from the original.  
10/4/2011  
RV mild to moderate post pump  
goal CI>2.3  
   
   
                                Hypoxemia       10/04/2011      10/06/2011  
   
                                discharge planning 2011      10/19/2011  
   
                                                      
  
  
Overview:Formatting of this note might be different from the original.  
age 71,  lives in [place], [place]. No DC needs.  
/  
   
   
                                Pre-op testing  2011      10/04/2011  
   
                                                      
  
  
Overview:Formatting of this note is different from the original.  
Images from the original note were not included.  
[facility]  
PRE-OP CHECKLIST  
  
Surgeon: Deangelo Angulo M.D. Informed Consent Completed: No  
STS Score: 1.4%  
CAD: Yes - CAD on Problem List: Yes  
Is intended procedure a CABG: Yes - is a beta blocker ordered? Yes  
  
H & P completed: Yes  
  
PA/LAT: Completed CT: Completed MRI: N/A LE US: N/A  
  
Cath: Yes - reviewed: Yes Echo:Completed EKG: Completed EF %: 61  
  
PI's: N/A Carotid: Completed Mapping: N/A Dental: Completed PFT's: N/A  
  
Basename 11 0729  
WBC 7.18  
HB 13.1  
HCT 41.8  
  
INR 1.0  
CREAT 1.35  
  
UA: Normal  
HCG:N/A  
  
ABO/ABO Confirmed: Yes  
  
Blood ordered: No  
  
SA Swab: Yes - results: Pending  
  
Last Dose of Anticoagulation: vitamins  
  
Op Note: N/A  
  
Pacemaker Check: N/A  
  
Consults: GI 2001  
  
DM: No  
  
Cardiac Surgical prep: No  
  
SIGNATURE: Krystal Castellanos RN CHECKED BY:  
DATE of SERVICE: 2011  
TIME of SERVICE: 2:23 PM  
  
  
   
   
                                Anemia of chronic disease 2011  
   
                                Intestinal polyp 2011  
   
                                                      
  
  
Overview:Formatting of this note might be different from the original.  
Distal ileum ulcerated polyp.  
   
   
                                Nonspecific abnormal results of liver function s  
tudy 2006  
   
                                Impotence of organic origin 2006  
   
                                Obesity, Class III, BMI 40-49.9 (morbid obesity)  
                 2021  
  
documented as of this encounter (statuses as of 2023)  
[facility]06- History of Past illness Narrative*   
  
                                Problem         Noted Date      Resolved Date  
   
                                Medicare annual wellness visit, subsequent 2019  
   
                                Encounter for long-term (current) use of medicat  
ions 2018  
   
                                                      
  
  
Overview:Formatting of this note might be different from the original.  
Added automatically from request for surgery [id number]  
   
   
                                History of colonic polyps 2018  
   
                                                      
  
  
Overview:Formatting of this note might be different from the original.  
Added automatically from request for surgery [id number]  
   
   
                                Gastroesophageal reflux disease 2018  
   
                                                      
  
  
Overview:Formatting of this note might be different from the original.  
Added automatically from request for surgery [id number]  
   
   
                                Acute pulmonary edema 10/05/2011      10/06/2011  
   
                                                      
  
  
Overview:Formatting of this note might be different from the original.  
10/5/2011  
cardiogenic and noncardiogenic factors  
  
   
   
                                Atelectasis     10/05/2011      10/06/2011  
   
                                Hypotension     10/04/2011      10/06/2011  
   
                                                      
  
  
Overview:Formatting of this note might be different from the original.  
10/4/2011  
goal map 65-80  
   
   
                                Stress hyperglycemia 10/04/2011      10/06/2011  
   
                                                      
  
  
Overview:Formatting of this note might be different from the original.  
10/4/2011  
Perioperative insulin resistance and exacerbation of hyperglycemia.  
Control blood glucose with titration of insulin infusion  
  
10/5/2011  
adequate control  
goal FBG<180  
  
   
   
                                Post-op pain    10/04/2011      10/06/2011  
   
                                Mechanically assisted ventilation 10/04/2011      
  10/05/2011  
   
                                                      
  
  
Overview:Formatting of this note might be different from the original.  
10/4/2011  
optimize MV settings, WTE  
current setting:FiO2, 75%, peep 8  
   
   
                                Cardiac insufficiency 10/04/2011      10/05/2011  
   
                                                      
  
  
Overview:Formatting of this note might be different from the original.  
10/4/2011  
RV mild to moderate post pump  
goal CI>2.3  
   
   
                                Hypoxemia       10/04/2011      10/06/2011  
   
                                discharge planning 2011      10/19/2011  
   
                                                      
  
  
Overview:Formatting of this note might be different from the original.  
age 71,  lives in [place], [place]. No DC needs.  
/  
   
   
                                Pre-op testing  2011      10/04/2011  
   
                                                      
  
  
Overview:Formatting of this note is different from the original.  
Images from the original note were not included.  
[facility]  
PRE-OP CHECKLIST  
  
Surgeon: Deangelo Angulo M.D. Informed Consent Completed: No  
STS Score: 1.4%  
CAD: Yes - CAD on Problem List: Yes  
Is intended procedure a CABG: Yes - is a beta blocker ordered? Yes  
  
H & P completed: Yes  
  
PA/LAT: Completed CT: Completed MRI: N/A LE US: N/A  
  
Cath: Yes - reviewed: Yes Echo:Completed EKG: Completed EF %: 61  
  
PI's: N/A Carotid: Completed Mapping: N/A Dental: Completed PFT's: N/A  
  
Basename 11 0729  
WBC 7.18  
HB 13.1  
HCT 41.8  
  
INR 1.0  
CREAT 1.35  
  
UA: Normal  
HCG:N/A  
  
ABO/ABO Confirmed: Yes  
  
Blood ordered: No  
  
SA Swab: Yes - results: Pending  
  
Last Dose of Anticoagulation: vitamins  
  
Op Note: N/A  
  
Pacemaker Check: N/A  
  
Consults: GI 2001  
  
DM: No  
  
Cardiac Surgical prep: No  
  
SIGNATURE: Krystal Castellanos RN CHECKED BY:  
DATE of SERVICE: 2011  
TIME of SERVICE: 2:23 PM  
  
  
   
   
                                Anemia of chronic disease 2011  
   
                                Intestinal polyp 2011  
   
                                                      
  
  
Overview:Formatting of this note might be different from the original.  
Distal ileum ulcerated polyp.  
   
   
                                Nonspecific abnormal results of liver function s  
pepe 2006  
   
                                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  
   
                                Obesity, Class III, BMI 40-49.9 (morbid obesity)  
                 2021  
  
documented as of this encounter (statuses as of 2023)  
[facility]06- History of Past illness Narrative*   
  
                                Problem         Noted Date      Resolved Date  
   
                                Medicare annual wellness visit, subsequent 2019  
   
                                Encounter for long-term (current) use of medicat  
ions 2018  
   
                                                      
  
  
Overview:Formatting of this note might be different from the original.  
Added automatically from request for surgery [id number]  
   
   
                                History of colonic polyps 2018  
   
                                                      
  
  
Overview:Formatting of this note might be different from the original.  
Added automatically from request for surgery [id number]  
   
   
                                Gastroesophageal reflux disease 2018  
   
                                                      
  
  
Overview:Formatting of this note might be different from the original.  
Added automatically from request for surgery [id number]  
   
   
                                Acute pulmonary edema 10/05/2011      10/06/2011  
   
                                                      
  
  
Overview:Formatting of this note might be different from the original.  
10/5/2011  
cardiogenic and noncardiogenic factors  
  
   
   
                                Atelectasis     10/05/2011      10/06/2011  
   
                                Hypotension     10/04/2011      10/06/2011  
   
                                                      
  
  
Overview:Formatting of this note might be different from the original.  
10/4/2011  
goal map 65-80  
   
   
                                Stress hyperglycemia 10/04/2011      10/06/2011  
   
                                                      
  
  
Overview:Formatting of this note might be different from the original.  
10/4/2011  
Perioperative insulin resistance and exacerbation of hyperglycemia.  
Control blood glucose with titration of insulin infusion  
  
10/5/2011  
adequate control  
goal FBG<180  
  
   
   
                                Post-op pain    10/04/2011      10/06/2011  
   
                                Mechanically assisted ventilation 10/04/2011      
  10/05/2011  
   
                                                      
  
  
Overview:Formatting of this note might be different from the original.  
10/4/2011  
optimize MV settings, WTE  
current setting:FiO2, 75%, peep 8  
   
   
                                Cardiac insufficiency 10/04/2011      10/05/2011  
   
                                                      
  
  
Overview:Formatting of this note might be different from the original.  
10/4/2011  
RV mild to moderate post pump  
goal CI>2.3  
   
   
                                Hypoxemia       10/04/2011      10/06/2011  
   
                                discharge planning 2011      10/19/2011  
   
                                                      
  
  
Overview:Formatting of this note might be different from the original.  
age 71,  lives in [place], [place]. No DC needs.  
/  
   
   
                                Pre-op testing  2011      10/04/2011  
   
                                                      
  
  
Overview:Formatting of this note is different from the original.  
Images from the original note were not included.  
[facility]  
PRE-OP CHECKLIST  
  
Surgeon: Deangelo Angulo M.D. Informed Consent Completed: No  
STS Score: 1.4%  
CAD: Yes - CAD on Problem List: Yes  
Is intended procedure a CABG: Yes - is a beta blocker ordered? Yes  
  
H & P completed: Yes  
  
PA/LAT: Completed CT: Completed MRI: N/A LE US: N/A  
  
Cath: Yes - reviewed: Yes Echo:Completed EKG: Completed EF %: 61  
  
PI's: N/A Carotid: Completed Mapping: N/A Dental: Completed PFT's: N/A  
  
Basename 11 0729  
WBC 7.18  
HB 13.1  
HCT 41.8  
  
INR 1.0  
CREAT 1.35  
  
UA: Normal  
HCG:N/A  
  
ABO/ABO Confirmed: Yes  
  
Blood ordered: No  
  
SA Swab: Yes - results: Pending  
  
Last Dose of Anticoagulation: vitamins  
  
Op Note: N/A  
  
Pacemaker Check: N/A  
  
Consults: GI 2001  
  
DM: No  
  
Cardiac Surgical prep: No  
  
SIGNATURE: Krystal Castellanos RN CHECKED BY:  
DATE of SERVICE: 2011  
TIME of SERVICE: 2:23 PM  
  
  
   
   
                                Anemia of chronic disease 2011  
   
                                Intestinal polyp 2011  
   
                                                      
  
  
Overview:Formatting of this note might be different from the original.  
Distal ileum ulcerated polyp.  
   
   
                                Nonspecific abnormal results of liver function s  
pepe 2006  
   
                                Impotence of organic origin 2006  
   
                                Obesity, Class III, BMI 40-49.9 (morbid obesity)  
                 2021  
  
documented as of this encounter (statuses as of 2023)  
[facility]06- History of Past illness Narrative*   
  
                                Problem         Noted Date      Resolved Date  
   
                                Medicare annual wellness visit, subsequent 2019  
   
                                Encounter for long-term (current) use of medicat  
ions 2018  
   
                                                      
  
  
Overview:Formatting of this note might be different from the original.  
Added automatically from request for surgery [id number]  
   
   
                                History of colonic polyps 2018  
   
                                                      
  
  
Overview:Formatting of this note might be different from the original.  
Added automatically from request for surgery [id number]  
   
   
                                Gastroesophageal reflux disease 2018  
   
                                                      
  
  
Overview:Formatting of this note might be different from the original.  
Added automatically from request for surgery [id number]  
   
   
                                Acute pulmonary edema 10/05/2011      10/06/2011  
   
                                                      
  
  
Overview:Formatting of this note might be different from the original.  
10/5/2011  
cardiogenic and noncardiogenic factors  
  
   
   
                                Atelectasis     10/05/2011      10/06/2011  
   
                                Hypotension     10/04/2011      10/06/2011  
   
                                                      
  
  
Overview:Formatting of this note might be different from the original.  
10/4/2011  
goal map 65-80  
   
   
                                Stress hyperglycemia 10/04/2011      10/06/2011  
   
                                                      
  
  
Overview:Formatting of this note might be different from the original.  
10/4/2011  
Perioperative insulin resistance and exacerbation of hyperglycemia.  
Control blood glucose with titration of insulin infusion  
  
10/5/2011  
adequate control  
goal FBG<180  
  
   
   
                                Post-op pain    10/04/2011      10/06/2011  
   
                                Mechanically assisted ventilation 10/04/2011      
  10/05/2011  
   
                                                      
  
  
Overview:Formatting of this note might be different from the original.  
10/4/2011  
optimize MV settings, WTE  
current setting:FiO2, 75%, peep 8  
   
   
                                Cardiac insufficiency 10/04/2011      10/05/2011  
   
                                                      
  
  
Overview:Formatting of this note might be different from the original.  
10/4/2011  
RV mild to moderate post pump  
goal CI>2.3  
   
   
                                Hypoxemia       10/04/2011      10/06/2011  
   
                                discharge planning 2011      10/19/2011  
   
                                                      
  
  
Overview:Formatting of this note might be different from the original.  
age 71,  lives in [place], [place]. No DC needs.  
/  
   
   
                                Pre-op testing  2011      10/04/2011  
   
                                                      
  
  
Overview:Formatting of this note is different from the original.  
Images from the original note were not included.  
[facility]  
PRE-OP CHECKLIST  
  
Surgeon: Deangelo Angulo M.D. Informed Consent Completed: No  
STS Score: 1.4%  
CAD: Yes - CAD on Problem List: Yes  
Is intended procedure a CABG: Yes - is a beta blocker ordered? Yes  
  
H & P completed: Yes  
  
PA/LAT: Completed CT: Completed MRI: N/A LE US: N/A  
  
Cath: Yes - reviewed: Yes Echo:Completed EKG: Completed EF %: 61  
  
PI's: N/A Carotid: Completed Mapping: N/A Dental: Completed PFT's: N/A  
  
Basename 11 0729  
WBC 7.18  
HB 13.1  
HCT 41.8  
  
INR 1.0  
CREAT 1.35  
  
UA: Normal  
HCG:N/A  
  
ABO/ABO Confirmed: Yes  
  
Blood ordered: No  
  
SA Swab: Yes - results: Pending  
  
Last Dose of Anticoagulation: vitamins  
  
Op Note: N/A  
  
Pacemaker Check: N/A  
  
Consults: GI 2001  
  
DM: No  
  
Cardiac Surgical prep: No  
  
SIGNATURE: Krystal Castellanos RN CHECKED BY:  
DATE of SERVICE: 2011  
TIME of SERVICE: 2:23 PM  
  
  
   
   
                                Anemia of chronic disease 2011  
   
                                Intestinal polyp 2011  
   
                                                      
  
  
Overview:Formatting of this note might be different from the original.  
Distal ileum ulcerated polyp.  
   
   
                                Nonspecific abnormal results of liver function s  
tudy 2006  
   
                                Impotence of organic origin 2006  
   
                                Obesity, Class III, BMI 40-49.9 (morbid obesity)  
                 2021  
  
documented as of this encounter (statuses as of 2023)  
[facility]06- History of Past illness Narrative*   
  
                                Problem         Noted Date      Resolved Date  
   
                                Medicare annual wellness visit, subsequent 2019  
   
                                Encounter for long-term (current) use of medicat  
ions 2018  
   
                                                      
  
  
Overview:Formatting of this note might be different from the original.  
Added automatically from request for surgery [id number]  
   
   
                                History of colonic polyps 2018  
   
                                                      
  
  
Overview:Formatting of this note might be different from the original.  
Added automatically from request for surgery [id number]  
   
   
                                Gastroesophageal reflux disease 2018  
   
                                                      
  
  
Overview:Formatting of this note might be different from the original.  
Added automatically from request for surgery [id number]  
   
   
                                Acute pulmonary edema 10/05/2011      10/06/2011  
   
                                                      
  
  
Overview:Formatting of this note might be different from the original.  
10/5/2011  
cardiogenic and noncardiogenic factors  
  
   
   
                                Atelectasis     10/05/2011      10/06/2011  
   
                                Hypotension     10/04/2011      10/06/2011  
   
                                                      
  
  
Overview:Formatting of this note might be different from the original.  
10/4/2011  
goal map 65-80  
   
   
                                Stress hyperglycemia 10/04/2011      10/06/2011  
   
                                                      
  
  
Overview:Formatting of this note might be different from the original.  
10/4/2011  
Perioperative insulin resistance and exacerbation of hyperglycemia.  
Control blood glucose with titration of insulin infusion  
  
10/5/2011  
adequate control  
goal FBG<180  
  
   
   
                                Post-op pain    10/04/2011      10/06/2011  
   
                                Mechanically assisted ventilation 10/04/2011      
  10/05/2011  
   
                                                      
  
  
Overview:Formatting of this note might be different from the original.  
10/4/2011  
optimize MV settings, WTE  
current setting:FiO2, 75%, peep 8  
   
   
                                Cardiac insufficiency 10/04/2011      10/05/2011  
   
                                                      
  
  
Overview:Formatting of this note might be different from the original.  
10/4/2011  
RV mild to moderate post pump  
goal CI>2.3  
   
   
                                Hypoxemia       10/04/2011      10/06/2011  
   
                                discharge planning 2011      10/19/2011  
   
                                                      
  
  
Overview:Formatting of this note might be different from the original.  
age 71,  lives in [place], [place]. No DC needs.  
/  
   
   
                                Pre-op testing  2011      10/04/2011  
   
                                                      
  
  
Overview:Formatting of this note is different from the original.  
Images from the original note were not included.  
[facility]  
PRE-OP CHECKLIST  
  
Surgeon: Deangelo Angulo M.D. Informed Consent Completed: No  
STS Score: 1.4%  
CAD: Yes - CAD on Problem List: Yes  
Is intended procedure a CABG: Yes - is a beta blocker ordered? Yes  
  
H & P completed: Yes  
  
PA/LAT: Completed CT: Completed MRI: N/A LE US: N/A  
  
Cath: Yes - reviewed: Yes Echo:Completed EKG: Completed EF %: 61  
  
PI's: N/A Carotid: Completed Mapping: N/A Dental: Completed PFT's: N/A  
  
Basename 11 0729  
WBC 7.18  
HB 13.1  
HCT 41.8  
  
INR 1.0  
CREAT 1.35  
  
UA: Normal  
HCG:N/A  
  
ABO/ABO Confirmed: Yes  
  
Blood ordered: No  
  
SA Swab: Yes - results: Pending  
  
Last Dose of Anticoagulation: vitamins  
  
Op Note: N/A  
  
Pacemaker Check: N/A  
  
Consults: GI 2001  
  
DM: No  
  
Cardiac Surgical prep: No  
  
SIGNATURE: Krystal Castellanos RN CHECKED BY:  
DATE of SERVICE: 2011  
TIME of SERVICE: 2:23 PM  
  
  
   
   
                                Anemia of chronic disease 2011  
   
                                Intestinal polyp 2011  
   
                                                      
  
  
Overview:Formatting of this note might be different from the original.  
Distal ileum ulcerated polyp.  
   
   
                                Nonspecific abnormal results of liver function s  
tudy 2006  
   
                                Impotence of organic origin 2006  
   
                                Obesity, Class III, BMI 40-49.9 (morbid obesity)  
                 2021  
  
documented as of this encounter (statuses as of 2023)  
[facility]06- History of Past illness Narrative*   
  
                                Problem         Noted Date      Resolved Date  
   
                                Medicare annual wellness visit, subsequent 2019  
   
                                Encounter for long-term (current) use of medicat  
ions 2018  
   
                                                      
  
  
Overview:Formatting of this note might be different from the original.  
Added automatically from request for surgery [id number]  
   
   
                                History of colonic polyps 2018  
   
                                                      
  
  
Overview:Formatting of this note might be different from the original.  
Added automatically from request for surgery [id number]  
   
   
                                Gastroesophageal reflux disease 2018  
   
                                                      
  
  
Overview:Formatting of this note might be different from the original.  
Added automatically from request for surgery [id number]  
   
   
                                Acute pulmonary edema 10/05/2011      10/06/2011  
   
                                                      
  
  
Overview:Formatting of this note might be different from the original.  
10/5/2011  
cardiogenic and noncardiogenic factors  
  
   
   
                                Atelectasis     10/05/2011      10/06/2011  
   
                                Hypotension     10/04/2011      10/06/2011  
   
                                                      
  
  
Overview:Formatting of this note might be different from the original.  
10/4/2011  
goal map 65-80  
   
   
                                Stress hyperglycemia 10/04/2011      10/06/2011  
   
                                                      
  
  
Overview:Formatting of this note might be different from the original.  
10/4/2011  
Perioperative insulin resistance and exacerbation of hyperglycemia.  
Control blood glucose with titration of insulin infusion  
  
10/5/2011  
adequate control  
goal FBG<180  
  
   
   
                                Post-op pain    10/04/2011      10/06/2011  
   
                                Mechanically assisted ventilation 10/04/2011      
  10/05/2011  
   
                                                      
  
  
Overview:Formatting of this note might be different from the original.  
10/4/2011  
optimize MV settings, WTE  
current setting:FiO2, 75%, peep 8  
   
   
                                Cardiac insufficiency 10/04/2011      10/05/2011  
   
                                                      
  
  
Overview:Formatting of this note might be different from the original.  
10/4/2011  
RV mild to moderate post pump  
goal CI>2.3  
   
   
                                Hypoxemia       10/04/2011      10/06/2011  
   
                                discharge planning 2011      10/19/2011  
   
                                                      
  
  
Overview:Formatting of this note might be different from the original.  
age 71,  lives in [place], [place]. No DC needs.  
/  
   
   
                                Pre-op testing  2011      10/04/2011  
   
                                                      
  
  
Overview:Formatting of this note is different from the original.  
Images from the original note were not included.  
[facility]  
PRE-OP CHECKLIST  
  
Surgeon: Deangelo Angulo M.D. Informed Consent Completed: No  
STS Score: 1.4%  
CAD: Yes - CAD on Problem List: Yes  
Is intended procedure a CABG: Yes - is a beta blocker ordered? Yes  
  
H & P completed: Yes  
  
PA/LAT: Completed CT: Completed MRI: N/A LE US: N/A  
  
Cath: Yes - reviewed: Yes Echo:Completed EKG: Completed EF %: 61  
  
PI's: N/A Carotid: Completed Mapping: N/A Dental: Completed PFT's: N/A  
  
Basename 11 0729  
WBC 7.18  
HB 13.1  
HCT 41.8  
  
INR 1.0  
CREAT 1.35  
  
UA: Normal  
HCG:N/A  
  
ABO/ABO Confirmed: Yes  
  
Blood ordered: No  
  
SA Swab: Yes - results: Pending  
  
Last Dose of Anticoagulation: vitamins  
  
Op Note: N/A  
  
Pacemaker Check: N/A  
  
Consults: GI 2001  
  
DM: No  
  
Cardiac Surgical prep: No  
  
SIGNATURE: Krystal Castellanos RN CHECKED BY:  
DATE of SERVICE: 2011  
TIME of SERVICE: 2:23 PM  
  
  
   
   
                                Anemia of chronic disease 2011  
   
                                Intestinal polyp 2011  
   
                                                      
  
  
Overview:Formatting of this note might be different from the original.  
Distal ileum ulcerated polyp.  
   
   
                                Nonspecific abnormal results of liver function s  
tudy 2006  
   
                                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  
   
                                Obesity, Class III, BMI 40-49.9 (morbid obesity)  
                 2021  
  
documented as of this encounter (statuses as of 2023)  
[facility]06- History of Past illness Narrative*   
  
                                Problem         Noted Date      Resolved Date  
   
                                Medicare annual wellness visit, subsequent 2019  
   
                                Encounter for long-term (current) use of medicat  
ions 2018  
   
                                                      
  
  
Overview:Formatting of this note might be different from the original.  
Added automatically from request for surgery [id number]  
   
   
                                History of colonic polyps 2018  
   
                                                      
  
  
Overview:Formatting of this note might be different from the original.  
Added automatically from request for surgery [id number]  
   
   
                                Gastroesophageal reflux disease 2018  
   
                                                      
  
  
Overview:Formatting of this note might be different from the original.  
Added automatically from request for surgery [id number]  
   
   
                                Acute pulmonary edema 10/05/2011      10/06/2011  
   
                                                      
  
  
Overview:Formatting of this note might be different from the original.  
10/5/2011  
cardiogenic and noncardiogenic factors  
  
   
   
                                Atelectasis     10/05/2011      10/06/2011  
   
                                Hypotension     10/04/2011      10/06/2011  
   
                                                      
  
  
Overview:Formatting of this note might be different from the original.  
10/4/2011  
goal map 65-80  
   
   
                                Stress hyperglycemia 10/04/2011      10/06/2011  
   
                                                      
  
  
Overview:Formatting of this note might be different from the original.  
10/4/2011  
Perioperative insulin resistance and exacerbation of hyperglycemia.  
Control blood glucose with titration of insulin infusion  
  
10/5/2011  
adequate control  
goal FBG<180  
  
   
   
                                Post-op pain    10/04/2011      10/06/2011  
   
                                Mechanically assisted ventilation 10/04/2011      
  10/05/2011  
   
                                                      
  
  
Overview:Formatting of this note might be different from the original.  
10/4/2011  
optimize MV settings, WTE  
current setting:FiO2, 75%, peep 8  
   
   
                                Cardiac insufficiency 10/04/2011      10/05/2011  
   
                                                      
  
  
Overview:Formatting of this note might be different from the original.  
10/4/2011  
RV mild to moderate post pump  
goal CI>2.3  
   
   
                                Hypoxemia       10/04/2011      10/06/2011  
   
                                discharge planning 2011      10/19/2011  
   
                                                      
  
  
Overview:Formatting of this note might be different from the original.  
age 71,  lives in [place], [place]. No DC needs.  
/  
   
   
                                Pre-op testing  2011      10/04/2011  
   
                                                      
  
  
Overview:Formatting of this note is different from the original.  
Images from the original note were not included.  
[facility]  
PRE-OP CHECKLIST  
  
Surgeon: Deangelo Angulo M.D. Informed Consent Completed: No  
STS Score: 1.4%  
CAD: Yes - CAD on Problem List: Yes  
Is intended procedure a CABG: Yes - is a beta blocker ordered? Yes  
  
H & P completed: Yes  
  
PA/LAT: Completed CT: Completed MRI: N/A LE US: N/A  
  
Cath: Yes - reviewed: Yes Echo:Completed EKG: Completed EF %: 61  
  
PI's: N/A Carotid: Completed Mapping: N/A Dental: Completed PFT's: N/A  
  
Basename 11 0729  
WBC 7.18  
HB 13.1  
HCT 41.8  
  
INR 1.0  
CREAT 1.35  
  
UA: Normal  
HCG:N/A  
  
ABO/ABO Confirmed: Yes  
  
Blood ordered: No  
  
SA Swab: Yes - results: Pending  
  
Last Dose of Anticoagulation: vitamins  
  
Op Note: N/A  
  
Pacemaker Check: N/A  
  
Consults: GI 2001  
  
DM: No  
  
Cardiac Surgical prep: No  
  
SIGNATURE: Krystal Castellanos RN CHECKED BY:  
DATE of SERVICE: 2011  
TIME of SERVICE: 2:23 PM  
  
  
   
   
                                Anemia of chronic disease 2011  
   
                                Intestinal polyp 2011  
   
                                                      
  
  
Overview:Formatting of this note might be different from the original.  
Distal ileum ulcerated polyp.  
   
   
                                Nonspecific abnormal results of liver function s  
tudy 2006  
   
                                Impotence of organic origin 2006  
   
                                Obesity, Class III, BMI 40-49.9 (morbid obesity)  
                 2021  
  
documented as of this encounter (statuses as of 2023)  
[facility]06- History of Past illness Narrative*   
  
                                Problem         Noted Date      Resolved Date  
   
                                Medicare annual wellness visit, subsequent 2019  
   
                                Encounter for long-term (current) use of medicat  
ions 2018  
   
                                                      
  
  
Overview:Formatting of this note might be different from the original.  
Added automatically from request for surgery [id number]  
   
   
                                History of colonic polyps 2018  
   
                                                      
  
  
Overview:Formatting of this note might be different from the original.  
Added automatically from request for surgery [id number]  
   
   
                                Gastroesophageal reflux disease 2018  
   
                                                      
  
  
Overview:Formatting of this note might be different from the original.  
Added automatically from request for surgery [id number]  
   
   
                                Acute pulmonary edema 10/05/2011      10/06/2011  
   
                                                      
  
  
Overview:Formatting of this note might be different from the original.  
10/5/2011  
cardiogenic and noncardiogenic factors  
  
   
   
                                Atelectasis     10/05/2011      10/06/2011  
   
                                Hypotension     10/04/2011      10/06/2011  
   
                                                      
  
  
Overview:Formatting of this note might be different from the original.  
10/4/2011  
goal map 65-80  
   
   
                                Stress hyperglycemia 10/04/2011      10/06/2011  
   
                                                      
  
  
Overview:Formatting of this note might be different from the original.  
10/4/2011  
Perioperative insulin resistance and exacerbation of hyperglycemia.  
Control blood glucose with titration of insulin infusion  
  
10/5/2011  
adequate control  
goal FBG<180  
  
   
   
                                Post-op pain    10/04/2011      10/06/2011  
   
                                Mechanically assisted ventilation 10/04/2011      
  10/05/2011  
   
                                                      
  
  
Overview:Formatting of this note might be different from the original.  
10/4/2011  
optimize MV settings, WTE  
current setting:FiO2, 75%, peep 8  
   
   
                                Cardiac insufficiency 10/04/2011      10/05/2011  
   
                                                      
  
  
Overview:Formatting of this note might be different from the original.  
10/4/2011  
RV mild to moderate post pump  
goal CI>2.3  
   
   
                                Hypoxemia       10/04/2011      10/06/2011  
   
                                discharge planning 2011      10/19/2011  
   
                                                      
  
  
Overview:Formatting of this note might be different from the original.  
age 71,  lives in [place], [place]. No DC needs.  
/  
   
   
                                Pre-op testing  2011      10/04/2011  
   
                                                      
  
  
Overview:Formatting of this note is different from the original.  
Images from the original note were not included.  
[facility]  
PRE-OP CHECKLIST  
  
Surgeon: Deangelo Angulo M.D. Informed Consent Completed: No  
STS Score: 1.4%  
CAD: Yes - CAD on Problem List: Yes  
Is intended procedure a CABG: Yes - is a beta blocker ordered? Yes  
  
H & P completed: Yes  
  
PA/LAT: Completed CT: Completed MRI: N/A LE US: N/A  
  
Cath: Yes - reviewed: Yes Echo:Completed EKG: Completed EF %: 61  
  
PI's: N/A Carotid: Completed Mapping: N/A Dental: Completed PFT's: N/A  
  
Basename 11 0729  
WBC 7.18  
HB 13.1  
HCT 41.8  
  
INR 1.0  
CREAT 1.35  
  
UA: Normal  
HCG:N/A  
  
ABO/ABO Confirmed: Yes  
  
Blood ordered: No  
  
SA Swab: Yes - results: Pending  
  
Last Dose of Anticoagulation: vitamins  
  
Op Note: N/A  
  
Pacemaker Check: N/A  
  
Consults: GI 2001  
  
DM: No  
  
Cardiac Surgical prep: No  
  
SIGNATURE: Krystal Castellanos RN CHECKED BY:  
DATE of SERVICE: 2011  
TIME of SERVICE: 2:23 PM  
  
  
   
   
                                Anemia of chronic disease 2011  
   
                                Intestinal polyp 2011  
   
                                                      
  
  
Overview:Formatting of this note might be different from the original.  
Distal ileum ulcerated polyp.  
   
   
                                Nonspecific abnormal results of liver function s  
tudy 2006  
   
                                Impotence of organic origin 2006  
   
                                Obesity, Class III, BMI 40-49.9 (morbid obesity)  
                 2021  
  
documented as of this encounter (statuses as of 2023)  
[facility]06- History of Past illness Narrative*   
  
                                Problem         Noted Date      Resolved Date  
   
                                Medicare annual wellness visit, subsequent 2019  
   
                                Encounter for long-term (current) use of medicat  
ions 2018  
   
                                                      
  
  
Overview:Formatting of this note might be different from the original.  
Added automatically from request for surgery [id number]  
   
   
                                History of colonic polyps 2018  
   
                                                      
  
  
Overview:Formatting of this note might be different from the original.  
Added automatically from request for surgery [id number]  
   
   
                                Gastroesophageal reflux disease 2018  
   
                                                      
  
  
Overview:Formatting of this note might be different from the original.  
Added automatically from request for surgery [id number]  
   
   
                                Acute pulmonary edema 10/05/2011      10/06/2011  
   
                                                      
  
  
Overview:Formatting of this note might be different from the original.  
10/5/2011  
cardiogenic and noncardiogenic factors  
  
   
   
                                Atelectasis     10/05/2011      10/06/2011  
   
                                Hypotension     10/04/2011      10/06/2011  
   
                                                      
  
  
Overview:Formatting of this note might be different from the original.  
10/4/2011  
goal map 65-80  
   
   
                                Stress hyperglycemia 10/04/2011      10/06/2011  
   
                                                      
  
  
Overview:Formatting of this note might be different from the original.  
10/4/2011  
Perioperative insulin resistance and exacerbation of hyperglycemia.  
Control blood glucose with titration of insulin infusion  
  
10/5/2011  
adequate control  
goal FBG<180  
  
   
   
                                Post-op pain    10/04/2011      10/06/2011  
   
                                Mechanically assisted ventilation 10/04/2011      
  10/05/2011  
   
                                                      
  
  
Overview:Formatting of this note might be different from the original.  
10/4/2011  
optimize MV settings, WTE  
current setting:FiO2, 75%, peep 8  
   
   
                                Cardiac insufficiency 10/04/2011      10/05/2011  
   
                                                      
  
  
Overview:Formatting of this note might be different from the original.  
10/4/2011  
RV mild to moderate post pump  
goal CI>2.3  
   
   
                                Hypoxemia       10/04/2011      10/06/2011  
   
                                discharge planning 2011      10/19/2011  
   
                                                      
  
  
Overview:Formatting of this note might be different from the original.  
age 71,  lives in [place], [place]. No DC needs.  
/  
   
   
                                Pre-op testing  2011      10/04/2011  
   
                                                      
  
  
Overview:Formatting of this note is different from the original.  
Images from the original note were not included.  
[facility]  
PRE-OP CHECKLIST  
  
Surgeon: Deangelo Angulo M.D. Informed Consent Completed: No  
STS Score: 1.4%  
CAD: Yes - CAD on Problem List: Yes  
Is intended procedure a CABG: Yes - is a beta blocker ordered? Yes  
  
H & P completed: Yes  
  
PA/LAT: Completed CT: Completed MRI: N/A LE US: N/A  
  
Cath: Yes - reviewed: Yes Echo:Completed EKG: Completed EF %: 61  
  
PI's: N/A Carotid: Completed Mapping: N/A Dental: Completed PFT's: N/A  
  
Basename 11 0729  
WBC 7.18  
HB 13.1  
HCT 41.8  
  
INR 1.0  
CREAT 1.35  
  
UA: Normal  
HCG:N/A  
  
ABO/ABO Confirmed: Yes  
  
Blood ordered: No  
  
SA Swab: Yes - results: Pending  
  
Last Dose of Anticoagulation: vitamins  
  
Op Note: N/A  
  
Pacemaker Check: N/A  
  
Consults: GI 2001  
  
DM: No  
  
Cardiac Surgical prep: No  
  
SIGNATURE: Krystal Castellanos RN CHECKED BY:  
DATE of SERVICE: 2011  
TIME of SERVICE: 2:23 PM  
  
  
   
   
                                Anemia of chronic disease 2011  
   
                                Intestinal polyp 2011  
   
                                                      
  
  
Overview:Formatting of this note might be different from the original.  
Distal ileum ulcerated polyp.  
   
   
                                Nonspecific abnormal results of liver function s  
tudy 2006  
   
                                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  
   
                                Obesity, Class III, BMI 40-49.9 (morbid obesity)  
                 2021  
  
documented as of this encounter (statuses as of 2023)  
[facility]06- History of Past illness Narrative*   
  
                                Problem         Noted Date      Resolved Date  
   
                                Medicare annual wellness visit, subsequent 2019  
   
                                Encounter for long-term (current) use of medicat  
ions 2018  
   
                                                      
  
  
Overview:Formatting of this note might be different from the original.  
Added automatically from request for surgery [id number]  
   
   
                                History of colonic polyps 2018  
   
                                                      
  
  
Overview:Formatting of this note might be different from the original.  
Added automatically from request for surgery [id number]  
   
   
                                Gastroesophageal reflux disease 2018  
   
                                                      
  
  
Overview:Formatting of this note might be different from the original.  
Added automatically from request for surgery [id number]  
   
   
                                Acute pulmonary edema 10/05/2011      10/06/2011  
   
                                                      
  
  
Overview:Formatting of this note might be different from the original.  
10/5/2011  
cardiogenic and noncardiogenic factors  
  
   
   
                                Atelectasis     10/05/2011      10/06/2011  
   
                                Hypotension     10/04/2011      10/06/2011  
   
                                                      
  
  
Overview:Formatting of this note might be different from the original.  
10/4/2011  
goal map 65-80  
   
   
                                Stress hyperglycemia 10/04/2011      10/06/2011  
   
                                                      
  
  
Overview:Formatting of this note might be different from the original.  
10/4/2011  
Perioperative insulin resistance and exacerbation of hyperglycemia.  
Control blood glucose with titration of insulin infusion  
  
10/5/2011  
adequate control  
goal FBG<180  
  
   
   
                                Post-op pain    10/04/2011      10/06/2011  
   
                                Mechanically assisted ventilation 10/04/2011      
  10/05/2011  
   
                                                      
  
  
Overview:Formatting of this note might be different from the original.  
10/4/2011  
optimize MV settings, WTE  
current setting:FiO2, 75%, peep 8  
   
   
                                Cardiac insufficiency 10/04/2011      10/05/2011  
   
                                                      
  
  
Overview:Formatting of this note might be different from the original.  
10/4/2011  
RV mild to moderate post pump  
goal CI>2.3  
   
   
                                Hypoxemia       10/04/2011      10/06/2011  
   
                                discharge planning 2011      10/19/2011  
   
                                                      
  
  
Overview:Formatting of this note might be different from the original.  
age 71,  lives in [place], [place]. No DC needs.  
/  
   
   
                                Pre-op testing  2011      10/04/2011  
   
                                                      
  
  
Overview:Formatting of this note is different from the original.  
Images from the original note were not included.  
[facility]  
PRE-OP CHECKLIST  
  
Surgeon: Deangelo Angulo M.D. Informed Consent Completed: No  
STS Score: 1.4%  
CAD: Yes - CAD on Problem List: Yes  
Is intended procedure a CABG: Yes - is a beta blocker ordered? Yes  
  
H & P completed: Yes  
  
PA/LAT: Completed CT: Completed MRI: N/A LE US: N/A  
  
Cath: Yes - reviewed: Yes Echo:Completed EKG: Completed EF %: 61  
  
PI's: N/A Carotid: Completed Mapping: N/A Dental: Completed PFT's: N/A  
  
Basename 11 0729  
WBC 7.18  
HB 13.1  
HCT 41.8  
  
INR 1.0  
CREAT 1.35  
  
UA: Normal  
HCG:N/A  
  
ABO/ABO Confirmed: Yes  
  
Blood ordered: No  
  
SA Swab: Yes - results: Pending  
  
Last Dose of Anticoagulation: vitamins  
  
Op Note: N/A  
  
Pacemaker Check: N/A  
  
Consults: GI 2001  
  
DM: No  
  
Cardiac Surgical prep: No  
  
SIGNATURE: Krystal Castellanos RN CHECKED BY:  
DATE of SERVICE: 2011  
TIME of SERVICE: 2:23 PM  
  
  
   
   
                                Anemia of chronic disease 2011  
   
                                Intestinal polyp 2011  
   
                                                      
  
  
Overview:Formatting of this note might be different from the original.  
Distal ileum ulcerated polyp.  
   
   
                                Nonspecific abnormal results of liver function s  
tudy 2006  
   
                                Impotence of organic origin 2006      09/0  
2015  
   
                                Obesity, Class III, BMI 40-49.9 (morbid obesity)  
                 2021  
  
documented as of this encounter (statuses as of 2023)  
[facility]06- History of Past illness Narrative*   
  
                                Problem         Noted Date      Resolved Date  
   
                                Medicare annual wellness visit, subsequent 2019  
   
                                Encounter for long-term (current) use of medicat  
ions 2018  
   
                                                      
  
  
Overview:Formatting of this note might be different from the original.  
Added automatically from request for surgery [id number]  
   
   
                                History of colonic polyps 2018  
   
                                                      
  
  
Overview:Formatting of this note might be different from the original.  
Added automatically from request for surgery [id number]  
   
   
                                Gastroesophageal reflux disease 2018  
   
                                                      
  
  
Overview:Formatting of this note might be different from the original.  
Added automatically from request for surgery [id number]  
   
   
                                Acute pulmonary edema 10/05/2011      10/06/2011  
   
                                                      
  
  
Overview:Formatting of this note might be different from the original.  
10/5/2011  
cardiogenic and noncardiogenic factors  
  
   
   
                                Atelectasis     10/05/2011      10/06/2011  
   
                                Hypotension     10/04/2011      10/06/2011  
   
                                                      
  
  
Overview:Formatting of this note might be different from the original.  
10/4/2011  
goal map 65-80  
   
   
                                Stress hyperglycemia 10/04/2011      10/06/2011  
   
                                                      
  
  
Overview:Formatting of this note might be different from the original.  
10/4/2011  
Perioperative insulin resistance and exacerbation of hyperglycemia.  
Control blood glucose with titration of insulin infusion  
  
10/5/2011  
adequate control  
goal FBG<180  
  
   
   
                                Post-op pain    10/04/2011      10/06/2011  
   
                                Mechanically assisted ventilation 10/04/2011      
  10/05/2011  
   
                                                      
  
  
Overview:Formatting of this note might be different from the original.  
10/4/2011  
optimize MV settings, WTE  
current setting:FiO2, 75%, peep 8  
   
   
                                Cardiac insufficiency 10/04/2011      10/05/2011  
   
                                                      
  
  
Overview:Formatting of this note might be different from the original.  
10/4/2011  
RV mild to moderate post pump  
goal CI>2.3  
   
   
                                Hypoxemia       10/04/2011      10/06/2011  
   
                                discharge planning 2011      10/19/2011  
   
                                                      
  
  
Overview:Formatting of this note might be different from the original.  
age 71,  lives in [place], [place]. No DC needs.  
/  
   
   
                                Pre-op testing  2011      10/04/2011  
   
                                                      
  
  
Overview:Formatting of this note is different from the original.  
Images from the original note were not included.  
[facility]  
PRE-OP CHECKLIST  
  
Surgeon: Deangelo Angulo M.D. Informed Consent Completed: No  
STS Score: 1.4%  
CAD: Yes - CAD on Problem List: Yes  
Is intended procedure a CABG: Yes - is a beta blocker ordered? Yes  
  
H & P completed: Yes  
  
PA/LAT: Completed CT: Completed MRI: N/A LE US: N/A  
  
Cath: Yes - reviewed: Yes Echo:Completed EKG: Completed EF %: 61  
  
PI's: N/A Carotid: Completed Mapping: N/A Dental: Completed PFT's: N/A  
  
Basename 11 0729  
WBC 7.18  
HB 13.1  
HCT 41.8  
  
INR 1.0  
CREAT 1.35  
  
UA: Normal  
HCG:N/A  
  
ABO/ABO Confirmed: Yes  
  
Blood ordered: No  
  
SA Swab: Yes - results: Pending  
  
Last Dose of Anticoagulation: vitamins  
  
Op Note: N/A  
  
Pacemaker Check: N/A  
  
Consults: GI 2001  
  
DM: No  
  
Cardiac Surgical prep: No  
  
SIGNATURE: Krystal Csatellanos RN CHECKED BY:  
DATE of SERVICE: 2011  
TIME of SERVICE: 2:23 PM  
  
  
   
   
                                Anemia of chronic disease 2011  
   
                                Intestinal polyp 2011  
   
                                                      
  
  
Overview:Formatting of this note might be different from the original.  
Distal ileum ulcerated polyp.  
   
   
                                Nonspecific abnormal results of liver function s  
nialldy 2006  
   
                                Impotence of organic origin 2006  
   
                                Obesity, Class III, BMI 40-49.9 (morbid obesity)  
                 2021  
  
documented as of this encounter (statuses as of 2023)  
[facility]06- History of Past illness Narrative*   
  
                                Problem         Noted Date      Resolved Date  
   
                                Medicare annual wellness visit, subsequent 2019  
   
                                Encounter for long-term (current) use of medicat  
ions 2018  
   
                                                      
  
  
Overview:Formatting of this note might be different from the original.  
Added automatically from request for surgery [id number]  
   
   
                                History of colonic polyps 2018  
   
                                                      
  
  
Overview:Formatting of this note might be different from the original.  
Added automatically from request for surgery [id number]  
   
   
                                Gastroesophageal reflux disease 2018  
   
                                                      
  
  
Overview:Formatting of this note might be different from the original.  
Added automatically from request for surgery [id number]  
   
   
                                Acute pulmonary edema 10/05/2011      10/06/2011  
   
                                                      
  
  
Overview:Formatting of this note might be different from the original.  
10/5/2011  
cardiogenic and noncardiogenic factors  
  
   
   
                                Atelectasis     10/05/2011      10/06/2011  
   
                                Hypotension     10/04/2011      10/06/2011  
   
                                                      
  
  
Overview:Formatting of this note might be different from the original.  
10/4/2011  
goal map 65-80  
   
   
                                Stress hyperglycemia 10/04/2011      10/06/2011  
   
                                                      
  
  
Overview:Formatting of this note might be different from the original.  
10/4/2011  
Perioperative insulin resistance and exacerbation of hyperglycemia.  
Control blood glucose with titration of insulin infusion  
  
10/5/2011  
adequate control  
goal FBG<180  
  
   
   
                                Post-op pain    10/04/2011      10/06/2011  
   
                                Mechanically assisted ventilation 10/04/2011      
  10/05/2011  
   
                                                      
  
  
Overview:Formatting of this note might be different from the original.  
10/4/2011  
optimize MV settings, WTE  
current setting:FiO2, 75%, peep 8  
   
   
                                Cardiac insufficiency 10/04/2011      10/05/2011  
   
                                                      
  
  
Overview:Formatting of this note might be different from the original.  
10/4/2011  
RV mild to moderate post pump  
goal CI>2.3  
   
   
                                Hypoxemia       10/04/2011      10/06/2011  
   
                                discharge planning 2011      10/19/2011  
   
                                                      
  
  
Overview:Formatting of this note might be different from the original.  
age 71,  lives in [place], [place]. No DC needs.  
/  
   
   
                                Pre-op testing  2011      10/04/2011  
   
                                                      
  
  
Overview:Formatting of this note is different from the original.  
Images from the original note were not included.  
[facility]  
PRE-OP CHECKLIST  
  
Surgeon: Deangelo Angulo M.D. Informed Consent Completed: No  
STS Score: 1.4%  
CAD: Yes - CAD on Problem List: Yes  
Is intended procedure a CABG: Yes - is a beta blocker ordered? Yes  
  
H & P completed: Yes  
  
PA/LAT: Completed CT: Completed MRI: N/A LE US: N/A  
  
Cath: Yes - reviewed: Yes Echo:Completed EKG: Completed EF %: 61  
  
PI's: N/A Carotid: Completed Mapping: N/A Dental: Completed PFT's: N/A  
  
Basename 11 0729  
WBC 7.18  
HB 13.1  
HCT 41.8  
  
INR 1.0  
CREAT 1.35  
  
UA: Normal  
HCG:N/A  
  
ABO/ABO Confirmed: Yes  
  
Blood ordered: No  
  
SA Swab: Yes - results: Pending  
  
Last Dose of Anticoagulation: vitamins  
  
Op Note: N/A  
  
Pacemaker Check: N/A  
  
Consults: GI 2001  
  
DM: No  
  
Cardiac Surgical prep: No  
  
SIGNATURE: Krystal Castellanos RN CHECKED BY:  
DATE of SERVICE: 2011  
TIME of SERVICE: 2:23 PM  
  
  
   
   
                                Anemia of chronic disease 2011  
   
                                Intestinal polyp 2011  
   
                                                      
  
  
Overview:Formatting of this note might be different from the original.  
Distal ileum ulcerated polyp.  
   
   
                                Nonspecific abnormal results of liver function s  
tudy 2006  
   
                                Impotence of organic origin 2006  
   
                                Obesity, Class III, BMI 40-49.9 (morbid obesity)  
                 2021  
  
documented as of this encounter (statuses as of 2023)  
[facility]06- History of Past illness Narrative*   
  
                                Problem         Noted Date      Resolved Date  
   
                                Medicare annual wellness visit, subsequent 2019  
   
                                Encounter for long-term (current) use of medicat  
ions 2018  
   
                                                      
  
  
Overview:Formatting of this note might be different from the original.  
Added automatically from request for surgery [id number]  
   
   
                                History of colonic polyps 2018  
   
                                                      
  
  
Overview:Formatting of this note might be different from the original.  
Added automatically from request for surgery [id number]  
   
   
                                Gastroesophageal reflux disease 2018  
   
                                                      
  
  
Overview:Formatting of this note might be different from the original.  
Added automatically from request for surgery [id number]  
   
   
                                Acute pulmonary edema 10/05/2011      10/06/2011  
   
                                                      
  
  
Overview:Formatting of this note might be different from the original.  
10/5/2011  
cardiogenic and noncardiogenic factors  
  
   
   
                                Atelectasis     10/05/2011      10/06/2011  
   
                                Hypotension     10/04/2011      10/06/2011  
   
                                                      
  
  
Overview:Formatting of this note might be different from the original.  
10/4/2011  
goal map 65-80  
   
   
                                Stress hyperglycemia 10/04/2011      10/06/2011  
   
                                                      
  
  
Overview:Formatting of this note might be different from the original.  
10/4/2011  
Perioperative insulin resistance and exacerbation of hyperglycemia.  
Control blood glucose with titration of insulin infusion  
  
10/5/2011  
adequate control  
goal FBG<180  
  
   
   
                                Post-op pain    10/04/2011      10/06/2011  
   
                                Mechanically assisted ventilation 10/04/2011      
  10/05/2011  
   
                                                      
  
  
Overview:Formatting of this note might be different from the original.  
10/4/2011  
optimize MV settings, WTE  
current setting:FiO2, 75%, peep 8  
   
   
                                Cardiac insufficiency 10/04/2011      10/05/2011  
   
                                                      
  
  
Overview:Formatting of this note might be different from the original.  
10/4/2011  
RV mild to moderate post pump  
goal CI>2.3  
   
   
                                Hypoxemia       10/04/2011      10/06/2011  
   
                                discharge planning 2011      10/19/2011  
   
                                                      
  
  
Overview:Formatting of this note might be different from the original.  
age 71,  lives in [place], [place]. No DC needs.  
/  
   
   
                                Pre-op testing  2011      10/04/2011  
   
                                                      
  
  
Overview:Formatting of this note is different from the original.  
Images from the original note were not included.  
[facility]  
PRE-OP CHECKLIST  
  
Surgeon: Deangelo Angulo M.D. Informed Consent Completed: No  
STS Score: 1.4%  
CAD: Yes - CAD on Problem List: Yes  
Is intended procedure a CABG: Yes - is a beta blocker ordered? Yes  
  
H & P completed: Yes  
  
PA/LAT: Completed CT: Completed MRI: N/A LE US: N/A  
  
Cath: Yes - reviewed: Yes Echo:Completed EKG: Completed EF %: 61  
  
PI's: N/A Carotid: Completed Mapping: N/A Dental: Completed PFT's: N/A  
  
Basename 11 0729  
WBC 7.18  
HB 13.1  
HCT 41.8  
  
INR 1.0  
CREAT 1.35  
  
UA: Normal  
HCG:N/A  
  
ABO/ABO Confirmed: Yes  
  
Blood ordered: No  
  
SA Swab: Yes - results: Pending  
  
Last Dose of Anticoagulation: vitamins  
  
Op Note: N/A  
  
Pacemaker Check: N/A  
  
Consults: GI 2001  
  
DM: No  
  
Cardiac Surgical prep: No  
  
SIGNATURE: Krystal Castellanos RN CHECKED BY:  
DATE of SERVICE: 2011  
TIME of SERVICE: 2:23 PM  
  
  
   
   
                                Anemia of chronic disease 2011  
   
                                Intestinal polyp 2011  
   
                                                      
  
  
Overview:Formatting of this note might be different from the original.  
Distal ileum ulcerated polyp.  
   
   
                                Nonspecific abnormal results of liver function s  
tudy 2006  
   
                                Impotence of organic origin 2006  
   
                                Obesity, Class III, BMI 40-49.9 (morbid obesity)  
                 2021  
  
documented as of this encounter (statuses as of 2023)  
[facility]06- History of Past illness Narrative*   
  
                                Problem         Noted Date      Resolved Date  
   
                                Medicare annual wellness visit, subsequent 2019  
   
                                Encounter for long-term (current) use of medicat  
ions 2018  
   
                                                      
  
  
Overview:Formatting of this note might be different from the original.  
Added automatically from request for surgery [id number]  
   
   
                                History of colonic polyps 2018  
   
                                                      
  
  
Overview:Formatting of this note might be different from the original.  
Added automatically from request for surgery [id number]  
   
   
                                Gastroesophageal reflux disease 2018  
   
                                                      
  
  
Overview:Formatting of this note might be different from the original.  
Added automatically from request for surgery [id number]  
   
   
                                Acute pulmonary edema 10/05/2011      10/06/2011  
   
                                                      
  
  
Overview:Formatting of this note might be different from the original.  
10/5/2011  
cardiogenic and noncardiogenic factors  
  
   
   
                                Atelectasis     10/05/2011      10/06/2011  
   
                                Hypotension     10/04/2011      10/06/2011  
   
                                                      
  
  
Overview:Formatting of this note might be different from the original.  
10/4/2011  
goal map 65-80  
   
   
                                Stress hyperglycemia 10/04/2011      10/06/2011  
   
                                                      
  
  
Overview:Formatting of this note might be different from the original.  
10/4/2011  
Perioperative insulin resistance and exacerbation of hyperglycemia.  
Control blood glucose with titration of insulin infusion  
  
10/5/2011  
adequate control  
goal FBG<180  
  
   
   
                                Post-op pain    10/04/2011      10/06/2011  
   
                                Mechanically assisted ventilation 10/04/2011      
  10/05/2011  
   
                                                      
  
  
Overview:Formatting of this note might be different from the original.  
10/4/2011  
optimize MV settings, WTE  
current setting:FiO2, 75%, peep 8  
   
   
                                Cardiac insufficiency 10/04/2011      10/05/2011  
   
                                                      
  
  
Overview:Formatting of this note might be different from the original.  
10/4/2011  
RV mild to moderate post pump  
goal CI>2.3  
   
   
                                Hypoxemia       10/04/2011      10/06/2011  
   
                                discharge planning 2011      10/19/2011  
   
                                                      
  
  
Overview:Formatting of this note might be different from the original.  
age 71,  lives in [place], [place]. No DC needs.  
/  
   
   
                                Pre-op testing  2011      10/04/2011  
   
                                                      
  
  
Overview:Formatting of this note is different from the original.  
Images from the original note were not included.  
[facility]  
PRE-OP CHECKLIST  
  
Surgeon: Deangelo Angulo M.D. Informed Consent Completed: No  
STS Score: 1.4%  
CAD: Yes - CAD on Problem List: Yes  
Is intended procedure a CABG: Yes - is a beta blocker ordered? Yes  
  
H & P completed: Yes  
  
PA/LAT: Completed CT: Completed MRI: N/A LE US: N/A  
  
Cath: Yes - reviewed: Yes Echo:Completed EKG: Completed EF %: 61  
  
PI's: N/A Carotid: Completed Mapping: N/A Dental: Completed PFT's: N/A  
  
Basename 11 0729  
WBC 7.18  
HB 13.1  
HCT 41.8  
  
INR 1.0  
CREAT 1.35  
  
UA: Normal  
HCG:N/A  
  
ABO/ABO Confirmed: Yes  
  
Blood ordered: No  
  
SA Swab: Yes - results: Pending  
  
Last Dose of Anticoagulation: vitamins  
  
Op Note: N/A  
  
Pacemaker Check: N/A  
  
Consults: GI 2001  
  
DM: No  
  
Cardiac Surgical prep: No  
  
SIGNATURE: Krystal Castellanos RN CHECKED BY:  
DATE of SERVICE: 2011  
TIME of SERVICE: 2:23 PM  
  
  
   
   
                                Anemia of chronic disease 2011  
   
                                Intestinal polyp 2011  
   
                                                      
  
  
Overview:Formatting of this note might be different from the original.  
Distal ileum ulcerated polyp.  
   
   
                                Nonspecific abnormal results of liver function s  
tudy 2006  
   
                                Impotence of organic origin 2006  
   
                                Obesity, Class III, BMI 40-49.9 (morbid obesity)  
                 2021  
  
documented as of this encounter (statuses as of 2023)  
[facility]06- History of Past illness Narrative*   
  
                                Problem         Noted Date      Resolved Date  
   
                                Medicare annual wellness visit, subsequent 2019  
   
                                Encounter for long-term (current) use of medicat  
ions 2018  
   
                                                      
  
  
Overview:Formatting of this note might be different from the original.  
Added automatically from request for surgery [id number]  
   
   
                                History of colonic polyps 2018  
   
                                                      
  
  
Overview:Formatting of this note might be different from the original.  
Added automatically from request for surgery [id number]  
   
   
                                Gastroesophageal reflux disease 2018  
   
                                                      
  
  
Overview:Formatting of this note might be different from the original.  
Added automatically from request for surgery [id number]  
   
   
                                Acute pulmonary edema 10/05/2011      10/06/2011  
   
                                                      
  
  
Overview:Formatting of this note might be different from the original.  
10/5/2011  
cardiogenic and noncardiogenic factors  
  
   
   
                                Atelectasis     10/05/2011      10/06/2011  
   
                                Hypotension     10/04/2011      10/06/2011  
   
                                                      
  
  
Overview:Formatting of this note might be different from the original.  
10/4/2011  
goal map 65-80  
   
   
                                Stress hyperglycemia 10/04/2011      10/06/2011  
   
                                                      
  
  
Overview:Formatting of this note might be different from the original.  
10/4/2011  
Perioperative insulin resistance and exacerbation of hyperglycemia.  
Control blood glucose with titration of insulin infusion  
  
10/5/2011  
adequate control  
goal FBG<180  
  
   
   
                                Post-op pain    10/04/2011      10/06/2011  
   
                                Mechanically assisted ventilation 10/04/2011      
  10/05/2011  
   
                                                      
  
  
Overview:Formatting of this note might be different from the original.  
10/4/2011  
optimize MV settings, WTE  
current setting:FiO2, 75%, peep 8  
   
   
                                Cardiac insufficiency 10/04/2011      10/05/2011  
   
                                                      
  
  
Overview:Formatting of this note might be different from the original.  
10/4/2011  
RV mild to moderate post pump  
goal CI>2.3  
   
   
                                Hypoxemia       10/04/2011      10/06/2011  
   
                                discharge planning 2011      10/19/2011  
   
                                                      
  
  
Overview:Formatting of this note might be different from the original.  
age 71,  lives in [place], [place]. No DC needs.  
/  
   
   
                                Pre-op testing  2011      10/04/2011  
   
                                                      
  
  
Overview:Formatting of this note is different from the original.  
Images from the original note were not included.  
[facility]  
PRE-OP CHECKLIST  
  
Surgeon: Deangelo Angulo M.D. Informed Consent Completed: No  
STS Score: 1.4%  
CAD: Yes - CAD on Problem List: Yes  
Is intended procedure a CABG: Yes - is a beta blocker ordered? Yes  
  
H & P completed: Yes  
  
PA/LAT: Completed CT: Completed MRI: N/A LE US: N/A  
  
Cath: Yes - reviewed: Yes Echo:Completed EKG: Completed EF %: 61  
  
PI's: N/A Carotid: Completed Mapping: N/A Dental: Completed PFT's: N/A  
  
Basename 11 0729  
WBC 7.18  
HB 13.1  
HCT 41.8  
  
INR 1.0  
CREAT 1.35  
  
UA: Normal  
HCG:N/A  
  
ABO/ABO Confirmed: Yes  
  
Blood ordered: No  
  
SA Swab: Yes - results: Pending  
  
Last Dose of Anticoagulation: vitamins  
  
Op Note: N/A  
  
Pacemaker Check: N/A  
  
Consults: GI 2001  
  
DM: No  
  
Cardiac Surgical prep: No  
  
SIGNATURE: Krystal Castellanos RN CHECKED BY:  
DATE of SERVICE: 2011  
TIME of SERVICE: 2:23 PM  
  
  
   
   
                                Anemia of chronic disease 2011  
   
                                Intestinal polyp 2011  
   
                                                      
  
  
Overview:Formatting of this note might be different from the original.  
Distal ileum ulcerated polyp.  
   
   
                                Nonspecific abnormal results of liver function s  
tudy 2006  
   
                                Impotence of organic origin 2006  
   
                                Obesity, Class III, BMI 40-49.9 (morbid obesity)  
                 2021  
  
documented as of this encounter (statuses as of 2023)  
[facility]06- History of Past illness Narrative*   
  
                                Problem         Noted Date      Resolved Date  
   
                                Medicare annual wellness visit, subsequent 2019  
   
                                Encounter for long-term (current) use of medicat  
ions 2018  
   
                                                      
  
  
Overview:Formatting of this note might be different from the original.  
Added automatically from request for surgery [id number]  
   
   
                                History of colonic polyps 2018  
   
                                                      
  
  
Overview:Formatting of this note might be different from the original.  
Added automatically from request for surgery [id number]  
   
   
                                Gastroesophageal reflux disease 2018  
   
                                                      
  
  
Overview:Formatting of this note might be different from the original.  
Added automatically from request for surgery [id number]  
   
   
                                Acute pulmonary edema 10/05/2011      10/06/2011  
   
                                                      
  
  
Overview:Formatting of this note might be different from the original.  
10/5/2011  
cardiogenic and noncardiogenic factors  
  
   
   
                                Atelectasis     10/05/2011      10/06/2011  
   
                                Hypotension     10/04/2011      10/06/2011  
   
                                                      
  
  
Overview:Formatting of this note might be different from the original.  
10/4/2011  
goal map 65-80  
   
   
                                Stress hyperglycemia 10/04/2011      10/06/2011  
   
                                                      
  
  
Overview:Formatting of this note might be different from the original.  
10/4/2011  
Perioperative insulin resistance and exacerbation of hyperglycemia.  
Control blood glucose with titration of insulin infusion  
  
10/5/2011  
adequate control  
goal FBG<180  
  
   
   
                                Post-op pain    10/04/2011      10/06/2011  
   
                                Mechanically assisted ventilation 10/04/2011      
  10/05/2011  
   
                                                      
  
  
Overview:Formatting of this note might be different from the original.  
10/4/2011  
optimize MV settings, WTE  
current setting:FiO2, 75%, peep 8  
   
   
                                Cardiac insufficiency 10/04/2011      10/05/2011  
   
                                                      
  
  
Overview:Formatting of this note might be different from the original.  
10/4/2011  
RV mild to moderate post pump  
goal CI>2.3  
   
   
                                Hypoxemia       10/04/2011      10/06/2011  
   
                                discharge planning 2011      10/19/2011  
   
                                                      
  
  
Overview:Formatting of this note might be different from the original.  
age 71,  lives in [place], [place]. No DC needs.  
/  
   
   
                                Pre-op testing  2011      10/04/2011  
   
                                                      
  
  
Overview:Formatting of this note is different from the original.  
Images from the original note were not included.  
[facility]  
PRE-OP CHECKLIST  
  
Surgeon: Deangelo Angulo M.D. Informed Consent Completed: No  
STS Score: 1.4%  
CAD: Yes - CAD on Problem List: Yes  
Is intended procedure a CABG: Yes - is a beta blocker ordered? Yes  
  
H & P completed: Yes  
  
PA/LAT: Completed CT: Completed MRI: N/A LE US: N/A  
  
Cath: Yes - reviewed: Yes Echo:Completed EKG: Completed EF %: 61  
  
PI's: N/A Carotid: Completed Mapping: N/A Dental: Completed PFT's: N/A  
  
Basename 11 0729  
WBC 7.18  
HB 13.1  
HCT 41.8  
  
INR 1.0  
CREAT 1.35  
  
UA: Normal  
HCG:N/A  
  
ABO/ABO Confirmed: Yes  
  
Blood ordered: No  
  
SA Swab: Yes - results: Pending  
  
Last Dose of Anticoagulation: vitamins  
  
Op Note: N/A  
  
Pacemaker Check: N/A  
  
Consults: GI 2001  
  
DM: No  
  
Cardiac Surgical prep: No  
  
SIGNATURE: Krystal Castellanos RN CHECKED BY:  
DATE of SERVICE: 2011  
TIME of SERVICE: 2:23 PM  
  
  
   
   
                                Anemia of chronic disease 2011  
   
                                Intestinal polyp 2011  
   
                                                      
  
  
Overview:Formatting of this note might be different from the original.  
Distal ileum ulcerated polyp.  
   
   
                                Nonspecific abnormal results of liver function s  
tudy 2006  
   
                                Impotence of organic origin 2006  
   
                                Obesity, Class III, BMI 40-49.9 (morbid obesity)  
                 2021  
  
documented as of this encounter (statuses as of 2023)  
[facility]06- History of Past illness Narrative*   
  
                          Problem      Noted Date   Diagnosed Date Resolved Date  
   
                          Medicare annual wellness visit, subsequent 2019  
   
                                                    Encounter for long-term (cur  
rent) use of   
medications         2018  
   
                                                      
  
  
Overview:Formatting of this note might be different from the original.  
Added automatically from request for surgery [id number]  
   
   
                          History of colonic polyps 2018  
   
                                                      
  
  
Overview:Formatting of this note might be different from the original.  
Added automatically from request for surgery [id number]  
   
   
                          Gastroesophageal reflux disease 2018  
   
                                                      
  
  
Overview:Formatting of this note might be different from the original.  
Added automatically from request for surgery [id number]  
   
   
                          Acute pulmonary edema 10/05/2011                10/06/  
2011  
   
                                                      
  
  
Overview:Formatting of this note might be different from the original.  
10/5/2011  
cardiogenic and noncardiogenic factors  
  
   
   
                          Atelectasis  10/05/2011                10/06/2011  
   
                          Hypotension  10/04/2011                10/06/2011  
   
                                                      
  
  
Overview:Formatting of this note might be different from the original.  
10/4/2011  
goal map 65-80  
   
   
                          Stress hyperglycemia 10/04/2011                10/06/2  
011  
   
                                                      
  
  
Overview:Formatting of this note might be different from the original.  
10/4/2011  
Perioperative insulin resistance and exacerbation of hyperglycemia.  
Control blood glucose with titration of insulin infusion  
  
10/5/2011  
adequate control  
goal FBG<180  
  
   
   
                          Post-op pain 10/04/2011                10/06/2011  
   
                          Mechanically assisted ventilation 10/04/2011            
      10/05/2011  
   
                                                      
  
  
Overview:Formatting of this note might be different from the original.  
10/4/2011  
optimize MV settings, WTE  
current setting:FiO2, 75%, peep 8  
   
   
                          Cardiac insufficiency 10/04/2011                10/05/  
2011  
   
                                                      
  
  
Overview:Formatting of this note might be different from the original.  
10/4/2011  
RV mild to moderate post pump  
goal CI>2.3  
   
   
                          Hypoxemia    10/04/2011                10/06/2011  
   
                          discharge planning 2011                10/19/201  
1  
   
                                                      
  
  
Overview:Formatting of this note might be different from the original.  
age 71,  lives in [place], [place]. No DC needs.  
/  
   
   
                          Pre-op testing 2011                10/04/2011  
   
                                                      
  
  
Overview:Formatting of this note is different from the original.  
Images from the original note were not included.  
[facility]  
PRE-OP CHECKLIST  
  
Surgeon: Deangelo Angulo M.D. Informed Consent Completed: No  
STS Score: 1.4%  
CAD: Yes - CAD on Problem List: Yes  
Is intended procedure a CABG: Yes - is a beta blocker ordered? Yes  
  
H & P completed: Yes  
  
PA/LAT: Completed CT: Completed MRI: N/A LE US: N/A  
  
Cath: Yes - reviewed: Yes Echo:Completed EKG: Completed EF %: 61  
  
PI's: N/A Carotid: Completed Mapping: N/A Dental: Completed PFT's: N/A  
  
Basename 11 0729  
WBC 7.18  
HB 13.1  
HCT 41.8  
  
INR 1.0  
CREAT 1.35  
  
UA: Normal  
HCG:N/A  
  
ABO/ABO Confirmed: Yes  
  
Blood ordered: No  
  
SA Swab: Yes - results: Pending  
  
Last Dose of Anticoagulation: vitamins  
  
Op Note: N/A  
  
Pacemaker Check: N/A  
  
Consults: GI 2001  
  
DM: No  
  
Cardiac Surgical prep: No  
  
SIGNATURE: Krystal Castellanos RN CHECKED BY:  
DATE of SERVICE: 2011  
TIME of SERVICE: 2:23 PM  
  
  
   
   
                          Anemia of chronic disease 2011  
   
                          Intestinal polyp 2011  
   
                                                      
  
  
Overview:Formatting of this note might be different from the original.  
Distal ileum ulcerated polyp.  
   
   
                                                    Nonspecific abnormal results  
 of liver   
function study      2006  
   
                          Impotence of organic origin 2006  
   
                                                    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  
9.9 (morbid   
obesity)                                                    2021  
  
documented as of this encounter (statuses as of 2023)  
[facility]06- History of Past illness Narrative*   
  
                          Problem      Noted Date   Diagnosed Date Resolved Date  
   
                          Medicare annual wellness visit, subsequent 2019  
   
                                                    Encounter for long-term (cur  
rent) use of   
medications         2018  
   
                                                      
  
  
Overview:Formatting of this note might be different from the original.  
Added automatically from request for surgery [id number]  
   
   
                          History of colonic polyps 2018  
   
                                                      
  
  
Overview:Formatting of this note might be different from the original.  
Added automatically from request for surgery [id number]  
   
   
                          Gastroesophageal reflux disease 2018  
   
                                                      
  
  
Overview:Formatting of this note might be different from the original.  
Added automatically from request for surgery [id number]  
   
   
                          Acute pulmonary edema 10/05/2011                10/06/  
2011  
   
                                                      
  
  
Overview:Formatting of this note might be different from the original.  
10/5/2011  
cardiogenic and noncardiogenic factors  
  
   
   
                          Atelectasis  10/05/2011                10/06/2011  
   
                          Hypotension  10/04/2011                10/06/2011  
   
                                                      
  
  
Overview:Formatting of this note might be different from the original.  
10/4/2011  
goal map 65-80  
   
   
                          Stress hyperglycemia 10/04/2011                10/06/2  
011  
   
                                                      
  
  
Overview:Formatting of this note might be different from the original.  
10/4/2011  
Perioperative insulin resistance and exacerbation of hyperglycemia.  
Control blood glucose with titration of insulin infusion  
  
10/5/2011  
adequate control  
goal FBG<180  
  
   
   
                          Post-op pain 10/04/2011                10/06/2011  
   
                          Mechanically assisted ventilation 10/04/2011            
      10/05/2011  
   
                                                      
  
  
Overview:Formatting of this note might be different from the original.  
10/4/2011  
optimize MV settings, WTE  
current setting:FiO2, 75%, peep 8  
   
   
                          Cardiac insufficiency 10/04/2011                10/05/  
2011  
   
                                                      
  
  
Overview:Formatting of this note might be different from the original.  
10/4/2011  
RV mild to moderate post pump  
goal CI>2.3  
   
   
                          Hypoxemia    10/04/2011                10/06/2011  
   
                          discharge planning 2011                10/19/201  
1  
   
                                                      
  
  
Overview:Formatting of this note might be different from the original.  
age 71,  lives in [place], [place]. No DC needs.  
/  
   
   
                          Pre-op testing 2011                10/04/2011  
   
                                                      
  
  
Overview:Formatting of this note is different from the original.  
Images from the original note were not included.  
[facility]  
PRE-OP CHECKLIST  
  
Surgeon: Deangelo Angulo M.D. Informed Consent Completed: No  
STS Score: 1.4%  
CAD: Yes - CAD on Problem List: Yes  
Is intended procedure a CABG: Yes - is a beta blocker ordered? Yes  
  
H & P completed: Yes  
  
PA/LAT: Completed CT: Completed MRI: N/A LE US: N/A  
  
Cath: Yes - reviewed: Yes Echo:Completed EKG: Completed EF %: 61  
  
PI's: N/A Carotid: Completed Mapping: N/A Dental: Completed PFT's: N/A  
  
Basename 11 0729  
WBC 7.18  
HB 13.1  
HCT 41.8  
  
INR 1.0  
CREAT 1.35  
  
UA: Normal  
HCG:N/A  
  
ABO/ABO Confirmed: Yes  
  
Blood ordered: No  
  
SA Swab: Yes - results: Pending  
  
Last Dose of Anticoagulation: vitamins  
  
Op Note: N/A  
  
Pacemaker Check: N/A  
  
Consults: GI 2001  
  
DM: No  
  
Cardiac Surgical prep: No  
  
SIGNATURE: Krystal Castellanos RN CHECKED BY:  
DATE of SERVICE: 2011  
TIME of SERVICE: 2:23 PM  
  
  
   
   
                          Anemia of chronic disease 2011  
   
                          Intestinal polyp 2011  
   
                                                      
  
  
Overview:Formatting of this note might be different from the original.  
Distal ileum ulcerated polyp.  
   
   
                                                    Nonspecific abnormal results  
 of liver   
function study      2006  
   
                          Impotence of organic origin 2006  
   
                                                    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  
9.9 (morbid   
obesity)                                                    2021  
  
documented as of this encounter (statuses as of 2023)  
[facility]06- History of Past illness Narrative*   
  
                          Problem      Noted Date   Diagnosed Date Resolved Date  
   
                          Medicare annual wellness visit, subsequent 2019  
   
                                                    Encounter for long-term (cur  
rent) use of   
medications         2018  
   
                                                      
  
  
Overview:Formatting of this note might be different from the original.  
Added automatically from request for surgery [id number]  
   
   
                          History of colonic polyps 2018  
   
                                                      
  
  
Overview:Formatting of this note might be different from the original.  
Added automatically from request for surgery [id number]  
   
   
                          Gastroesophageal reflux disease 2018  
   
                                                      
  
  
Overview:Formatting of this note might be different from the original.  
Added automatically from request for surgery [id number]  
   
   
                          Acute pulmonary edema 10/05/2011                10/06/  
2011  
   
                                                      
  
  
Overview:Formatting of this note might be different from the original.  
10/5/2011  
cardiogenic and noncardiogenic factors  
  
   
   
                          Atelectasis  10/05/2011                10/06/2011  
   
                          Hypotension  10/04/2011                10/06/2011  
   
                                                      
  
  
Overview:Formatting of this note might be different from the original.  
10/4/2011  
goal map 65-80  
   
   
                          Stress hyperglycemia 10/04/2011                10/06/2  
011  
   
                                                      
  
  
Overview:Formatting of this note might be different from the original.  
10/4/2011  
Perioperative insulin resistance and exacerbation of hyperglycemia.  
Control blood glucose with titration of insulin infusion  
  
10/5/2011  
adequate control  
goal FBG<180  
  
   
   
                          Post-op pain 10/04/2011                10/06/2011  
   
                          Mechanically assisted ventilation 10/04/2011            
      10/05/2011  
   
                                                      
  
  
Overview:Formatting of this note might be different from the original.  
10/4/2011  
optimize MV settings, WTE  
current setting:FiO2, 75%, peep 8  
   
   
                          Cardiac insufficiency 10/04/2011                10/05/  
2011  
   
                                                      
  
  
Overview:Formatting of this note might be different from the original.  
10/4/2011  
RV mild to moderate post pump  
goal CI>2.3  
   
   
                          Hypoxemia    10/04/2011                10/06/2011  
   
                          discharge planning 2011                10/19/201  
1  
   
                                                      
  
  
Overview:Formatting of this note might be different from the original.  
age 71,  lives in [place], [place]. No DC needs.  
/  
   
   
                          Pre-op testing 2011                10/04/2011  
   
                                                      
  
  
Overview:Formatting of this note is different from the original.  
Images from the original note were not included.  
[facility]  
PRE-OP CHECKLIST  
  
Surgeon: Deangelo Angulo M.D. Informed Consent Completed: No  
STS Score: 1.4%  
CAD: Yes - CAD on Problem List: Yes  
Is intended procedure a CABG: Yes - is a beta blocker ordered? Yes  
  
H & P completed: Yes  
  
PA/LAT: Completed CT: Completed MRI: N/A LE US: N/A  
  
Cath: Yes - reviewed: Yes Echo:Completed EKG: Completed EF %: 61  
  
PI's: N/A Carotid: Completed Mapping: N/A Dental: Completed PFT's: N/A  
  
Basename 11 0729  
WBC 7.18  
HB 13.1  
HCT 41.8  
  
INR 1.0  
CREAT 1.35  
  
UA: Normal  
HCG:N/A  
  
ABO/ABO Confirmed: Yes  
  
Blood ordered: No  
  
SA Swab: Yes - results: Pending  
  
Last Dose of Anticoagulation: vitamins  
  
Op Note: N/A  
  
Pacemaker Check: N/A  
  
Consults: GI 2001  
  
DM: No  
  
Cardiac Surgical prep: No  
  
SIGNATURE: Krystal Castellanos RN CHECKED BY:  
DATE of SERVICE: 2011  
TIME of SERVICE: 2:23 PM  
  
  
   
   
                          Anemia of chronic disease 2011  
   
                          Intestinal polyp 2011  
   
                                                      
  
  
Overview:Formatting of this note might be different from the original.  
Distal ileum ulcerated polyp.  
   
   
                                                    Nonspecific abnormal results  
 of liver   
function study      2006  
   
                          Impotence of organic origin 2006  
   
                                                    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  
9.9 (morbid   
obesity)                                                    2021  
  
documented as of this encounter (statuses as of 2023)  
[facility]   
  
                                                    Diagnosis  
   
                                                      
  
  
Essential hypertension- Primary  
  
  
Unspecified essential hypertension  
  
documented in this encounter  
[facility]   
  
                                                    Diagnosis  
   
                                                      
  
  
Gastroesophageal reflux disease, unspecified whether esophagitis present  
   
                                                      
  
  
Essential hypertension  
  
  
Unspecified essential hypertension  
   
                                                      
  
  
Mixed hyperlipidemia  
   
                                                      
  
  
Aortic valve disorder  
  
  
Aortic valve disorders  
   
                                                      
  
  
Coronary artery disease involving native coronary artery of native heart without
   
angina pectoris  
  
documented in this encounter  
[facility]   
  
                                                    Diagnosis  
   
                                                      
  
  
Adenomatous polyp of colon, unspecified part of colon- Primary  
   
                                                      
  
  
Impaired fasting glucose  
   
                                                      
  
  
Stage 3 chronic kidney disease, unspecified whether stage 3a or 3b CKD (HCC)  
   
                                                      
  
  
Essential hypertension  
  
  
Unspecified essential hypertension  
   
                                                      
  
  
Anemia of chronic disease  
  
  
Anemia of other chronic disease  
   
                                                      
  
  
Thrombocytopenia (HCC)  
  
  
Thrombocytopenia, unspecified  
  
documented in this encounter  
[facility]   
  
                                                    Diagnosis  
   
                                                      
  
  
Peripheral polyneuropathy- Primary  
  
  
Unspecified hereditary and idiopathic peripheral neuropathy  
   
                                                      
  
  
Frequent falls  
  
  
Personal history of fall  
   
                                                      
  
  
Contusion of hip, unspecified laterality, subsequent encounter  
   
                                                      
  
  
Muscular weakness  
  
  
Muscle weakness (generalized)  
   
                                                      
  
  
Abnormality of gait  
  
documented in this encounter  
[facility]   
  
                                                    Diagnosis  
   
                                                      
  
  
Frequent falls- Primary  
  
  
Personal history of fall  
   
                                                      
  
  
Peripheral polyneuropathy  
  
  
Unspecified hereditary and idiopathic peripheral neuropathy  
  
documented in this encounter  
[facility]   
  
                                                    Diagnosis  
   
                                                      
  
  
Peripheral polyneuropathy- Primary  
  
  
Unspecified hereditary and idiopathic peripheral neuropathy  
   
                                                      
  
  
Abnormality of gait  
   
                                                      
  
  
Stage 3a chronic kidney disease (HCC)  
   
                                                      
  
  
Essential hypertension  
  
  
Unspecified essential hypertension  
  
documented in this encounter  
[facility]   
  
                                                    Diagnosis  
   
                                                      
  
  
Personal history of falling, presenting hazards to health- Primary  
  
  
Personal history of fall  
   
                                                      
  
  
Peripheral polyneuropathy  
  
  
Unspecified hereditary and idiopathic peripheral neuropathy  
   
                                                      
  
  
Abnormality of gait  
  
documented in this encounter  
[facility]   
  
                                                    Diagnosis  
   
                                                      
  
  
Monoclonal gammopathy- Primary  
  
  
Monoclonal paraproteinemia  
   
                                                      
  
  
Macrocytic anemia  
  
  
Unspecified deficiency anemia  
   
                                                      
  
  
Thrombocytopenia (HCC)  
  
  
Thrombocytopenia, unspecified  
  
documented in this encounter  
[facility]   
  
                                                    Diagnosis  
   
                                                      
  
  
Cirrhosis of liver without ascites, unspecified hepatic cirrhosis type (HCC)- 
Primary  
   
                                                      
  
  
Splenomegaly  
   
                                                      
  
  
Liver lesion  
  
  
Other specified disorders of liver  
   
                                                      
  
  
Metabolic syndrome  
  
  
Dysmetabolic Syndrome X  
   
                                                      
  
  
Lower extremity edema  
  
  
Edema  
   
                                                      
  
  
Healthcare maintenance  
  
  
Routine general medical examination at a health care facility  
  
documented in this encounter  
[facility]   
  
                                                    Diagnosis  
   
                                                      
  
  
Cirrhosis of liver without ascites, unspecified hepatic cirrhosis type (HCC)- 
Primary  
   
                                                      
  
  
Claustrophobia  
  
  
Other isolated or specific phobias  
  
documented in this encounter  
[facility]   
  
                                                    Diagnosis  
   
                                                      
  
  
Stasis dermatitis of both legs- Primary  
  
  
Varicose veins of lower extremities with inflammation  
   
                                                      
  
  
Local skin infection  
  
  
Unspecified local infection of skin and subcutaneous tissue  
   
                                                      
  
  
Urge incontinence of urine  
  
  
Urge incontinence  
   
                                                      
  
  
Peripheral polyneuropathy  
  
  
Unspecified hereditary and idiopathic peripheral neuropathy  
  
documented in this encounter  
[facility]   
  
                                                    Diagnosis  
   
                                                      
  
  
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  
  
  
Iron deficiency anemia secondary to blood loss (chronic)  
   
                                                      
  
  
Iron malabsorption  
  
  
Other specified intestinal malabsorption  
  
documented in this encounter  
[facility]   
  
                                                    Diagnosis  
   
                                                      
  
  
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  
  
  
Iron deficiency anemia secondary to blood loss (chronic)  
   
                                                      
  
  
Iron malabsorption  
  
  
Other specified intestinal malabsorption  
  
documented in this encounter  
[facility]   
  
                                                    Diagnosis  
   
                                                      
  
  
Iron deficiency anemia, unspecified iron deficiency anemia type- Primary  
   
                                                      
  
  
History of colonic polyps  
  
  
Personal history of colonic polyps  
   
                                                      
  
  
History of cirrhosis  
  
  
Personal history of other diseases of digestive system  
  
documented in this encounter  
[facility]   
  
                                                    Diagnosis  
   
                                                      
  
  
Cirrhosis of liver without ascites, unspecified hepatic cirrhosis type (HCC)- 
Primary  
   
                                                      
  
  
Liver mass  
  
  
Unspecified disorder of liver  
   
                                                      
  
  
Splenomegaly  
  
documented in this encounter  
[facility]   
  
                                                    [facility]  
   
                                                      
  
  
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  
  
  
Iron deficiency anemia secondary to blood loss (chronic)  
   
                                                      
  
  
Iron malabsorption  
  
  
Other specified intestinal malabsorption  
  
documented in this encounter  
[facility]   
  
                                                    Diagnosis  
   
                                                      
  
  
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  
  
  
Iron deficiency anemia secondary to blood loss (chronic)  
   
                                                      
  
  
Iron malabsorption  
  
  
Other specified intestinal malabsorption  
  
documented in this encounter  
[facility]   
  
                                                    Diagnosis  
   
                                                      
  
  
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  
  
  
Iron deficiency anemia secondary to blood loss (chronic)  
   
                                                      
  
  
Iron malabsorption  
  
  
Other specified intestinal malabsorption  
  
documented in this encounter  
[facility]   
  
                                                    Diagnosis  
   
                                                      
  
  
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  
  
  
Iron deficiency anemia secondary to blood loss (chronic)  
   
                                                      
  
  
Iron malabsorption  
  
  
Other specified intestinal malabsorption  
  
documented in this encounter  
[facility]   
  
                                                    Diagnosis  
   
                                                      
  
  
IgG monoclonal gammopathy- Primary  
  
  
Monoclonal paraproteinemia  
   
                                                      
  
  
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  
  
  
Iron deficiency anemia secondary to blood loss (chronic)  
   
                                                      
  
  
Iron malabsorption  
  
  
Other specified intestinal malabsorption  
   
                                                      
  
  
Liver mass  
  
  
Unspecified disorder of liver  
   
                                                      
  
  
Splenomegaly  
   
                                                      
  
  
Hypercalcemia  
  
documented in this encounter  
[facility]   
  
                                                    Diagnosis  
   
                                                      
  
  
MGUS (monoclonal gammopathy of unknown significance)- Primary  
  
  
Monoclonal paraproteinemia  
   
                                                      
  
  
IgG monoclonal gammopathy  
  
  
Monoclonal paraproteinemia  
   
                                                      
  
  
Thrombocytopenia (HCC)  
  
  
Thrombocytopenia, unspecified  
   
                                                      
  
  
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  
  
  
Iron deficiency anemia secondary to blood loss (chronic)  
   
                                                      
  
  
Hyperparathyroidism (HCC)  
  
  
Hyperparathyroidism, unspecified  
   
                                                      
  
  
Other cirrhosis of liver (HCC)  
  
documented in this encounter  
[facility]   
  
                                                    Diagnosis  
   
                                                      
  
  
Urge incontinence of urine  
  
  
Urge incontinence  
  
documented in this encounter  
[facility]   
  
                                                    Diagnosis  
   
                                                      
  
  
Venous stasis ulcer of right calf limited to breakdown of skin without varicose 
veins   
(HCC)- Primary  
   
                                                      
  
  
Cellulitis of right lower leg  
  
  
Cellulitis and abscess of leg, except foot  
  
documented in this encounter  
[facility]   
  
                                                    Diagnosis  
   
                                                      
  
  
Venous stasis ulcer of other part of right lower leg limited to breakdown of 
skin   
without varicose veins (HCC)- Primary  
   
                                                      
  
  
Rash  
  
  
Rash and other nonspecific skin eruption  
   
                                                      
  
  
Partial thickness burn of left hand, unspecified site of hand, initial encounter  
   
                                                      
  
  
Cirrhosis of liver without ascites, unspecified hepatic cirrhosis type (HCC)  
  
documented in this encounter  
[facility]   
  
                                                    Diagnosis  
   
                                                      
  
  
Cirrhosis of liver without ascites, unspecified hepatic cirrhosis type (HCC)- 
Primary  
   
                                                      
  
  
Hepatocellular carcinoma (HCC)  
  
  
Malignant neoplasm of liver, primary  
  
documented in this encounter  
[facility]   
  
                                                    Diagnosis  
   
                                                      
  
  
Cirrhosis of liver without ascites, unspecified hepatic cirrhosis type (HCC)  
   
                                                      
  
  
Liver mass  
  
  
Unspecified disorder of liver  
   
                                                      
  
  
Splenomegaly  
  
documented in this encounter  
[facility]   
  
                                                    Diagnosis  
   
                                                      
  
  
Hepatocellular carcinoma (HCC)- Primary  
  
  
Malignant neoplasm of liver, primary  
  
documented in this encounter  
[facility]   
  
                                                    Diagnosis  
   
                                                      
  
  
Hepatocellular carcinoma (HCC)- Primary  
  
  
Malignant neoplasm of liver, primary  
   
                                                      
  
  
Cirrhosis of liver without ascites, unspecified hepatic cirrhosis type (HCC)  
  
documented in this encounter  
[facility]   
  
                                                    Diagnosis  
   
                                                      
  
  
Cirrhosis of liver without ascites, unspecified hepatic cirrhosis type (HCC)- 
Primary  
  
documented in this encounter  
[facility]   
  
                                                    Diagnosis  
   
                                                      
  
  
Hepatocellular carcinoma (HCC)- Primary  
  
  
Malignant neoplasm of liver, primary  
  
documented in this encounter  
[facility]   
  
                                                    Diagnosis  
   
                                                      
  
  
Hepatocellular carcinoma (HCC)- Primary  
  
  
Malignant neoplasm of liver, primary  
  
documented in this encounter  
[facility]   
  
                                                    Diagnosis  
   
                                                      
  
  
Unspecified essential hypertension  
   
                                                      
  
  
Urge incontinence of urine  
  
  
Urge incontinence  
  
documented in this encounter  
[facility]   
  
                                                    Diagnosis  
   
                                                      
  
  
Coronary artery disease involving native coronary artery of native heart without
   
angina pectoris  
   
                                                      
  
  
Urge incontinence of urine  
  
  
Urge incontinence  
  
documented in this encounter  
[facility]   
  
                                                    Diagnosis  
   
                                                      
  
  
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  
  
  
Iron deficiency anemia secondary to blood loss (chronic)  
   
                                                      
  
  
Macrocytic anemia  
  
  
Unspecified deficiency anemia  
   
                                                      
  
  
Hepatocellular carcinoma (HCC)  
  
  
Malignant neoplasm of liver, primary  
   
                                                      
  
  
Thrombocytopenia (HCC)  
  
  
Thrombocytopenia, unspecified  
   
                                                      
  
  
Hyperparathyroidism (HCC)  
  
  
Hyperparathyroidism, unspecified  
   
                                                      
  
  
MGUS (monoclonal gammopathy of unknown significance)  
  
  
Monoclonal paraproteinemia  
  
documented in this encounter  
[facility]   
  
                                                    Diagnosis  
   
                                                      
  
  
New onset atrial flutter (HCC)- Primary  
  
  
Atrial flutter  
   
                                                      
  
  
Coronary artery disease involving native coronary artery of native heart without
   
angina pectoris  
  
documented in this encounter  
[facility]   
  
                                                    Diagnosis  
   
                                                      
  
  
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  
  
  
Iron deficiency anemia secondary to blood loss (chronic)  
   
                                                      
  
  
Iron malabsorption  
  
  
Other specified intestinal malabsorption  
  
documented in this encounter  
[facility]   
  
                                                    Diagnosis  
   
                                                      
  
  
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  
  
  
Iron deficiency anemia secondary to blood loss (chronic)  
   
                                                      
  
  
Iron malabsorption  
  
  
Other specified intestinal malabsorption  
  
documented in this encounter  
[facility]   
  
                                                    Diagnosis  
   
                                                      
  
  
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  
  
  
Iron deficiency anemia secondary to blood loss (chronic)  
   
                                                      
  
  
Iron malabsorption  
  
  
Other specified intestinal malabsorption  
  
documented in this encounter  
[facility]   
  
                                                    Diagnosis  
   
                                                      
  
  
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  
  
  
Iron deficiency anemia secondary to blood loss (chronic)  
   
                                                      
  
  
Iron malabsorption  
  
  
Other specified intestinal malabsorption  
  
documented in this encounter  
[facility] note*   
  
                                                    Diagnosis  
   
                                                      
  
  
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  
  
  
Iron deficiency anemia secondary to blood loss (chronic)  
   
                                                      
  
  
Iron malabsorption  
  
  
Other specified intestinal malabsorption  
  
documented in this encounter  
[facility]   
  
                                                    Diagnosis  
   
                                                      
  
  
Melena- Primary  
  
  
Blood in stool  
  
documented in this encounter  
[facility] note*   
  
                                                    Diagnosis  
   
                                                      
  
  
Monoclonal gammopathy  
  
  
Monoclonal paraproteinemia  
   
                                                      
  
  
Macrocytic anemia  
  
  
Unspecified deficiency anemia  
   
                                                      
  
  
Thrombocytopenia (HCC)  
  
  
Thrombocytopenia, unspecified  
  
documented in this encounter  
[facility] note*   
  
                                                    Diagnosis  
   
                                                      
  
  
Liver disease  
  
  
Unspecified disorder of liver  
  
documented in this encounter  
[facility] Plan of care note* Visit Details  
  
                                                    Visit Type -PT SOC  
Discipline -Physical Therapy  
  
  
                                    Problems  
  
                    Problem   Description Start Date Status    Goals     Interve  
ntions  
   
                                                      
  
  
PT Learning   
Assessment  
  
  
Disciplines:  
  
  
PT                                                    
  
  
2022                                  
  
  
Active                                    
  
  
1 goal linked to   
scheduled/document  
ed intervention                           
  
  
1 goal   
intervention   
scheduled/document  
ed in this visit  
   
                                                      
  
  
Medication   
Education  
  
  
Disciplines:  
  
  
Skilled   
Services                                              
  
  
2022                                  
  
  
Active                                    
  
  
1 goal linked to   
scheduled/document  
ed intervention                           
  
  
1 goal   
intervention   
scheduled/document  
ed in this visit  
   
                                                      
  
  
Physician   
Specific   
Parameters  
  
  
Disciplines:  
  
  
Skilled   
Services                                              
  
  
2022                                  
  
  
Active                                    
  
  
1 goal linked to   
scheduled/document  
ed intervention                           
  
  
1 goal   
intervention   
scheduled/document  
ed in this visit  
   
                                                      
  
  
Risk for Falls  
  
  
Disciplines:  
  
  
Skilled   
Services                                              
  
  
2022                                  
  
  
Active                                    
  
  
1 goal linked to   
scheduled/document  
ed intervention                           
  
  
1 goal   
intervention   
scheduled/document  
ed in this visit  
   
                                                      
  
  
Advance   
Directives  
  
  
Disciplines:  
  
  
Skilled   
Services                                              
  
  
2022                                  
  
  
Resolved on   
2022                                  
  
  
1 goal linked to   
scheduled/document  
ed intervention                           
  
  
1 goal   
intervention   
scheduled/document  
ed in this visit  
  
  
                                      Goals  
  
                      Goal       Associated Problem Outcome    Goal Met?  Visit   
Notes  
   
                                                      
  
  
Demonstrate understanding of   
education  
  
  
Description:  
  
  
Patient and/or caregiver   
will understand educational   
instruction to be achieved   
by 10/1/22. .                             
  
  
PT Learning Assessment                    
  
                                          
  
  
No                                        
   
                                                      
  
  
Patient/caregiver will   
demonstrate ability to   
obtain, store, identify and   
administer ordered   
medications, keep accurate   
medication list in home, and   
adhere to medication   
schedule  
  
  
Description:  
  
  
Patient/caregiver will   
demonstrate ability to   
obtain, store, identify and   
administer ordered   
medications, keep accurate   
medication list in home, and   
adhere to medication   
schedule by 10/1/22.  
  
                                          
  
  
Medication Education                      
  
                                          
  
  
No                                        
   
                                                      
  
  
Patient to maintain   
parameters within   
physician-specified ranges   
throughout certification   
period                                    
  
  
Physician Specific   
Parameters                                
  
                                          
  
  
No                                        
   
                                                      
  
  
Manage Risk for falls  
  
  
Description:  
  
  
Patient/caregiver will   
verbalize knowledge of   
individualized fall   
prevention strategies by   
10/1/22. .                                
  
  
Risk for Falls                            
  
                                          
  
  
No                                        
   
                                                      
  
  
Patient/caregiver will make   
healthcare providers aware   
of and any changes to   
Advance Directives   
throughout certification   
period                                    
  
  
Advance Directives                        
  
  
Completed                                 
  
  
Yes                                       
  
  
                                  Interventions  
  
                                        Intervention        Associated   
Problem/Goal        Status              Variance            Visit Notes  
   
                                                      
  
  
Instruct and educate   
on knowledge deficits                   Problem:PT Learning   
Assessment  
Goal:Demonstrate   
understanding of   
education                                 
  
  
Completed                                 
  
                                          
  
  
patient verbalize   
and/or demonstrate   
understanding of   
physical therapy   
education including   
fall prevention   
strategies, home   
safety and home   
exercise program.  
  
  
  
  
  
Education methods   
include: verbal cues.  
  
  
  
  
  
Further education   
required to improve   
knowledge and   
compliance with fall   
prevention   
strategies, home   
safety, functional   
activity and home   
exercise program.  
   
                                                      
  
  
Medication Education  
  
  
Description:  
  
  
Evaluate/instruct   
patient/caregiver on   
obtaining, storing,   
identifying and   
administering ordered   
medications as well as   
keeping accurate   
medication list in the   
home and adhereing to   
medication schedule                     Problem:Medication   
Education  
Goal:Patient/caregive  
r will demonstrate   
ability to obtain,   
store, identify and   
administer ordered   
medications, keep   
accurate medication   
list in home, and   
adhere to medication   
schedule                                  
  
  
Completed                                 
  
                                          
  
  
Patient instructed on   
importance of keeping   
accurate medication   
list in home and   
adhering to   
medication schedule.  
   
                                                      
  
  
SPO2  
  
  
Description:  
  
  
Notify Dr. Ahuja   
if pulse ox is <92% at   
rest.                                   Problem:Physician   
Specific Parameters  
Goal:Patient to   
maintain parameters   
within   
physician-specified   
ranges throughout   
certification period                      
  
  
Completed                                 
  
                                          
   
                                                      
  
  
Instruct on individual   
fall risk factors and   
strategies to prevent   
falls and injuries   
caused by falls.                        Problem:Risk for   
Falls  
Goal:Manage Risk for   
falls                                     
  
  
Completed                                 
  
                                          
  
  
PT: Patient   
instructed on  
  
  
Eliminating   
Environmental   
Hazards: Keep   
pathways clear, Keep   
pets out of pathways,   
Remove unsafe rugs,   
Move furniture from   
pathways and Wear   
supportive shoes or   
non-skid socks  
  
  
Managing Impaired   
Functional Mobility:   
Use assistive   
device(s): front   
wheeled walker  
  
  
  
  
  
   
                                                      
  
  
Determine patient's   
Advance Directive   
Status  
  
  
Description:  
  
  
Patient does have   
advance directives.  
  
  
Patient's Advance   
Directives determined   
to be  
  
  
available in Home and   
[facility] DPOA   
and Living Will.  
  
  
  
  
  
  
  
  
  
  
                                        Problem:Advance   
Directives  
Goal:Patient/caregive  
r will make   
healthcare providers   
aware of and any   
changes to Advance   
Directives throughout   
certification period                      
  
  
Completed                                 
  
                                          
  
  
Discussed Advance   
Directives with   
Patient and/or   
Caregiver.  
  
  
Referred patient to   
Home Care handbook   
for further   
information on   
Healthcare DPOA &   
Living Will.  
  
documented in this encounter  
[facility]  
  
                                                    Visit Type -PT ROUTINE  
Discipline -Physical Therapy  
  
  
                                    Problems  
  
                    Problem   Description Start Date Status    Goals     Interve  
ntions  
   
                                                      
  
  
PT Impaired   
muscle   
performance   
and/or ROM  
  
  
Disciplines:  
  
  
PT                                                    
  
  
2022                                  
  
  
Active                                    
  
  
1 goal linked to   
scheduled/document  
ed intervention                           
  
  
1 goal   
intervention   
scheduled/document  
ed in this visit  
   
                                                      
  
  
PT Impaired gait  
  
  
Disciplines:  
  
  
PT                                                    
  
  
2022                                  
  
  
Active                                    
  
  
1 goal linked to   
scheduled/document  
ed intervention                           
  
  
1 goal   
intervention   
scheduled/document  
ed in this visit  
   
                                                      
  
  
PT Learning   
Assessment  
  
  
Disciplines:  
  
  
PT                                                    
  
  
2022                                  
  
  
Active                                    
  
  
1 goal linked to   
scheduled/document  
ed intervention                           
  
  
1 goal   
intervention   
scheduled/document  
ed in this visit  
   
                                                      
  
  
Medication   
Education  
  
  
Disciplines:  
  
  
Skilled Services                                      
  
  
2022                                  
  
  
Active                                    
  
  
1 goal linked to   
scheduled/document  
ed intervention                           
  
  
1 goal   
intervention   
scheduled/document  
ed in this visit  
   
                                                      
  
  
Sepsis  
  
  
Disciplines:  
  
  
Skilled Services                                      
  
  
2022                                  
  
  
Active                                    
  
  
1 goal linked to   
scheduled/document  
ed intervention                           
  
  
1 goal   
intervention   
scheduled/document  
ed in this visit  
   
                                                      
  
  
Physician   
Specific   
Parameters  
  
  
Disciplines:  
  
  
Skilled Services                                      
  
  
2022                                  
  
  
Active                                    
  
  
1 goal linked to   
scheduled/document  
ed intervention                           
  
  
1 goal   
intervention   
scheduled/document  
ed in this visit  
   
                                                      
  
  
Risk for Falls  
  
  
Disciplines:  
  
  
Skilled Services                                      
  
  
2022                                  
  
  
Active                                    
  
  
1 goal linked to   
scheduled/document  
ed intervention                           
  
  
1 goal   
intervention   
scheduled/document  
ed in this visit  
  
  
                                      Goals  
  
                      Goal       Associated Problem Outcome    Goal Met?  Visit   
Notes  
   
                                                      
  
  
Improved Muscle Performance   
and/or ROM  
  
  
Description:  
  
  
LTG: Patient will demonstrate   
improved muscle performance   
to meet functional goals as   
evidenced by ability to   
tolerate 5 sit to stands in   
30 sec and tolerate 10 min of   
a standing activity, to be   
achieved by 10/1/22. .  
  
  
  
  
  
STG: Patient and/or caregiver   
will verbalize/demonstrate   
independence with home   
exercise program, to improve   
functional mobility, to be   
achieved by 9/10/22.  
  
  
  
  
  
LTG: Patient will demonstrate   
improved left hip active   
range of motion to 100* in   
standing degrees, to meet   
functional goals, to be   
achieved by 10/1/22. .                    
  
  
PT Impaired muscle   
performance and/or ROM                    
  
                                          
  
  
No                                        
   
                                                      
  
  
Improved Gait  
  
  
Description:  
  
  
  
  
  
LTG: Patient will demonstrate   
improved gait ability as   
evidenced by ambulation 150   
feet with rollator walker   
independently with AD, to   
return to safe household and   
community ambulation, in   
order to return to plf , to   
be achieved by 10/1/22. .  
  
  
  
  
                                          
  
  
PT Impaired gait                          
  
                                          
  
  
No                                        
   
                                                      
  
  
Demonstrate understanding of   
education  
  
  
Description:  
  
  
Patient and/or caregiver will   
understand educational   
instruction to be achieved by   
10/1/22. .                                
  
  
PT Learning Assessment                    
  
                                          
  
  
No                                        
   
                                                      
  
  
Patient/caregiver will   
demonstrate ability to   
obtain, store, identify and   
administer ordered   
medications, keep accurate   
medication list in home, and   
adhere to medication schedule  
  
  
Description:  
  
  
Patient/caregiver will   
demonstrate ability to   
obtain, store, identify and   
administer ordered   
medications, keep accurate   
medication list in home, and   
adhere to medication schedule   
by 10/1/22.  
  
                                          
  
  
Medication Education                      
  
                                          
  
  
No                                        
   
                                                      
  
  
Patient/caregiver will be   
able to identify and report   
symptoms of sepsis  
  
  
Description:  
  
  
Patient/caregiver will be   
able to identify   
signs/symptoms of sepsis   
infection and will verbalize   
actions to take if suspected   
by 10/1/22. .                             
  
  
Sepsis                                    
  
                                          
  
  
No                                        
   
                                                      
  
  
Patient to maintain   
parameters within   
physician-specified ranges   
throughout certification   
period                                    
  
  
Physician Specific   
Parameters                                
  
                                          
  
  
No                                        
   
                                                      
  
  
Manage Risk for falls  
  
  
Description:  
  
  
Patient/caregiver will   
verbalize knowledge of   
individualized fall   
prevention strategies by   
10/1/22. .                                
  
  
Risk for Falls                            
  
                                          
  
  
No                                        
  
  
                                  Interventions  
  
                                        Intervention        Associated   
Problem/Goal        Status              Variance            Visit Notes  
   
                                                      
  
  
Physical Therapy   
Therapeutic Exercises                   Problem:PT Impaired   
muscle performance   
and/or ROM  
Goal:Improved Muscle   
Performance and/or   
ROM                                       
  
  
Completed                                 
  
                                          
  
  
patient instructed on   
strengthening   
exercises including   
Supine : GS,QS,HS,   
HIPADD, HIP BD, HEEL   
SLIDES, SAQ, X 10  
  
  
  
  
  
SEATED: FAQ, HIP   
FLEX, ,HIP ADD, HIP   
ABD AP X10  
  
  
with verbal and   
tactile cues for   
TECHNIQUE .  
   
                                                      
  
  
Physical Therapy Gait   
Training                                Problem:PT Impaired   
gait  
Goal:Improved Gait                        
  
  
Completed                                 
  
                                          
  
  
Gait training and   
instruction to   
patient on safe   
ambulation with front   
wheeled walker for   
40' x2 feet with   
supervision, with   
verbal cues for   
corrections of gait   
deviations. Pt tends   
to stand more upright   
with his WW vs the   
rollator .  
   
                                                      
  
  
Instruct and educate   
on knowledge deficits                   Problem:PT Learning   
Assessment  
Goal:Demonstrate   
understanding of   
education                                 
  
  
Completed                                 
  
                                          
  
  
patient verbalize   
and/or demonstrate   
understanding of   
physical therapy   
education including   
fall prevention   
strategies and home   
safety.  
  
  
  
  
  
Education methods   
include: verbal cues.  
  
  
  
  
  
Further education   
required to improve   
knowledge and   
compliance with   
pulmonary disease   
management,   
nutrition, home   
safety, functional   
activity and home   
exercise program.  
   
                                                      
  
  
Medication Education  
  
  
Description:  
  
  
Evaluate/instruct   
patient/caregiver on   
obtaining, storing,   
identifying and   
administering ordered   
medications as well as   
keeping accurate   
medication list in the   
home and adhereing to   
medication schedule                     Problem:Medication   
Education  
Goal:Patient/caregive  
r will demonstrate   
ability to obtain,   
store, identify and   
administer ordered   
medications, keep   
accurate medication   
list in home, and   
adhere to medication   
schedule                                  
  
  
Completed                                 
  
                                          
  
  
Patient instructed on   
importance of keeping   
accurate medication   
list in home and   
adhering to   
medication schedule.  
   
                                                      
  
  
Risk of Sepsis  
  
  
Description:  
  
  
Patient is at risk for   
sepsis. Monitor   
closely for s/s of   
sepsis.                                 Problem:Sepsis  
Goal:Patient/caregive  
r will be able to   
identify and report   
symptoms of sepsis                        
  
  
Completed                                 
  
                                          
   
                                                      
  
  
SPO2  
  
  
Description:  
  
  
Notify Dr. Ahuja   
if pulse ox is <92% at   
rest.                                   Problem:Physician   
Specific Parameters  
Goal:Patient to   
maintain parameters   
within   
physician-specified   
ranges throughout   
certification period                      
  
  
Completed                                 
  
                                          
   
                                                      
  
  
Instruct on individual   
fall risk factors and   
strategies to prevent   
falls and injuries   
caused by falls.                        Problem:Risk for   
Falls  
Goal:Manage Risk for   
falls                                     
  
  
Completed                                 
  
                                          
  
  
PT: Patient   
instructed on  
  
  
Eliminating   
Environmental   
Hazards: Keep   
pathways clear, Keep   
pets out of pathways   
and Remove unsafe   
rugs  
  
  
Managing Impaired   
Functional Mobility:   
Use assistive   
device(s): front   
wheeled walker  
  
  
  
  
  
  
documented in this encounter  
[facility] home Plan of care note* Visit Details  
  
                                                    Visit Type -PTA ROUTINE  
Discipline -Physical Therapy  
  
  
                                    Problems  
  
                    Problem   Description Start Date Status    Goals     Interve  
ntions  
   
                                                      
  
  
PT Impaired   
muscle   
performance   
and/or ROM  
  
  
Disciplines:  
  
  
PT                                                    
  
  
2022                                  
  
  
Active                                    
  
  
1 goal linked to   
scheduled/document  
ed intervention                           
  
  
1 goal   
intervention   
scheduled/document  
ed in this visit  
   
                                                      
  
  
PT Impaired gait  
  
  
Disciplines:  
  
  
PT                                                    
  
  
2022                                  
  
  
Active                                    
  
  
1 goal linked to   
scheduled/document  
ed intervention                           
  
  
1 goal   
intervention   
scheduled/document  
ed in this visit  
   
                                                      
  
  
PT Learning   
Assessment  
  
  
Disciplines:  
  
  
PT                                                    
  
  
2022                                  
  
  
Active                                    
  
  
1 goal linked to   
scheduled/document  
ed intervention                           
  
  
1 goal   
intervention   
scheduled/document  
ed in this visit  
   
                                                      
  
  
Medication   
Education  
  
  
Disciplines:  
  
  
Skilled Services                                      
  
  
2022                                  
  
  
Active                                    
  
  
1 goal linked to   
scheduled/document  
ed intervention                           
  
  
1 goal   
intervention   
scheduled/document  
ed in this visit  
   
                                                      
  
  
Sepsis  
  
  
Disciplines:  
  
  
Skilled Services                                      
  
  
2022                                  
  
  
Active                                    
  
  
1 goal linked to   
scheduled/document  
ed intervention                           
  
  
1 goal   
intervention   
scheduled/document  
ed in this visit  
   
                                                      
  
  
Physician   
Specific   
Parameters  
  
  
Disciplines:  
  
  
Skilled Services                                      
  
  
2022                                  
  
  
Active                                    
  
  
1 goal linked to   
scheduled/document  
ed intervention                           
  
  
1 goal   
intervention   
scheduled/document  
ed in this visit  
   
                                                      
  
  
Risk for Falls  
  
  
Disciplines:  
  
  
Skilled Services                                      
  
  
2022                                  
  
  
Active                                    
  
  
1 goal linked to   
scheduled/document  
ed intervention                           
  
  
1 goal   
intervention   
scheduled/document  
ed in this visit  
   
                                                      
  
  
Pain  
  
  
Disciplines:  
  
  
Skilled Services                                      
  
  
2022                                  
  
  
Active                                    
  
  
1 goal linked to   
scheduled/document  
ed intervention                           
  
  
1 goal   
intervention   
scheduled/document  
ed in this visit  
   
                                                      
  
  
Discharge  
  
  
Disciplines:  
  
  
Skilled Services                                      
  
  
2022                                  
  
  
Active                                    
  
  
1 goal linked to   
scheduled/document  
ed intervention                           
  
  
1 goal   
intervention   
scheduled/document  
ed in this visit  
  
  
                                      Goals  
  
                      Goal       Associated Problem Outcome    Goal Met?  Visit   
Notes  
   
                                                      
  
  
Improved Muscle Performance   
and/or ROM  
  
  
Description:  
  
  
LTG: Patient will demonstrate   
improved muscle performance   
to meet functional goals as   
evidenced by ability to   
tolerate 5 sit to stands in   
30 sec and tolerate 10 min of   
a standing activity, to be   
achieved by 10/1/22. .  
  
  
  
  
  
STG: Patient and/or caregiver   
will verbalize/demonstrate   
independence with home   
exercise program, to improve   
functional mobility, to be   
achieved by 9/10/22.  
  
  
  
  
  
LTG: Patient will demonstrate   
improved left hip active   
range of motion to 100* in   
standing degrees, to meet   
functional goals, to be   
achieved by 10/1/22. .                    
  
  
PT Impaired muscle   
performance and/or ROM                    
  
                                          
  
  
No                                        
   
                                                      
  
  
Improved Gait  
  
  
Description:  
  
  
  
  
  
LTG: Patient will demonstrate   
improved gait ability as   
evidenced by ambulation 150   
feet with rollator walker   
independently with AD, to   
return to safe household and   
community ambulation, in   
order to return to plf , to   
be achieved by 10/1/22. .  
  
  
  
  
                                          
  
  
PT Impaired gait                          
  
                                          
  
  
No                                        
   
                                                      
  
  
Demonstrate understanding of   
education  
  
  
Description:  
  
  
Patient and/or caregiver will   
understand educational   
instruction to be achieved by   
10/1/22. .                                
  
  
PT Learning Assessment                    
  
                                          
  
  
No                                        
   
                                                      
  
  
Patient/caregiver will   
demonstrate ability to   
obtain, store, identify and   
administer ordered   
medications, keep accurate   
medication list in home, and   
adhere to medication schedule  
  
  
Description:  
  
  
Patient/caregiver will   
demonstrate ability to   
obtain, store, identify and   
administer ordered   
medications, keep accurate   
medication list in home, and   
adhere to medication schedule   
by 10/1/22.  
  
                                          
  
  
Medication Education                      
  
                                          
  
  
No                                        
   
                                                      
  
  
Patient/caregiver will be   
able to identify and report   
symptoms of sepsis  
  
  
Description:  
  
  
Patient/caregiver will be   
able to identify   
signs/symptoms of sepsis   
infection and will verbalize   
actions to take if suspected   
by 10/1/22. .                             
  
  
Sepsis                                    
  
                                          
  
  
No                                        
   
                                                      
  
  
Patient to maintain   
parameters within   
physician-specified ranges   
throughout certification   
period                                    
  
  
Physician Specific   
Parameters                                
  
                                          
  
  
No                                        
   
                                                      
  
  
Manage Risk for falls  
  
  
Description:  
  
  
Patient/caregiver will   
verbalize knowledge of   
individualized fall   
prevention strategies by   
10/1/22. .                                
  
  
Risk for Falls                            
  
                                          
  
  
No                                        
   
                                                      
  
  
Manage Pain  
  
  
Description:  
  
  
Patient/caregiver will   
verbalize knowledge and   
understanding of appropriate   
techniques to control pain,   
including pain medication and   
non-pharmacological   
techniques. Patient will   
verbalize or demonstrate an   
acceptable level of pain as   
evidenced by a pain score of   
<5/10 and improvement in   
ability to perform activities   
of daily living to be   
achieved by 10/1/22. .                    
  
  
Pain                                      
  
                                          
  
  
No                                        
   
                                                      
  
  
Manage discharge planning  
  
  
Description:  
  
  
Patient/caregiver will   
verbalize understanding of   
ongoing discharge plan   
provided related to disease   
management, arrangements for   
outpatient and/or community   
services, obtaining   
medications, supplies, and   
DME, as needed throughout   
certification period.                     
  
  
Discharge                                 
  
                                          
  
  
No                                        
  
  
                                  Interventions  
  
                                        Intervention        Associated   
Problem/Goal        Status              Variance            Visit Notes  
   
                                                      
  
  
Physical Therapy   
Therapeutic Exercises                   Problem:PT Impaired   
muscle performance   
and/or ROM  
Goal:Improved Muscle   
Performance and/or   
ROM                                       
  
  
Completed                                 
  
                                          
  
  
patient instructed on   
strengthening   
exercises including   
supine: ankle pumps,   
quad sets, hip abd and   
adduction, saq and   
heel slides x's 10   
each. seated: laq. hip   
flexion and heel toe   
raises x's 10 each   
with verbal cues for   
correct form and pace.   
patient instructed to   
perform home exercise   
program twice a day   
which included above   
exercises.  
   
                                                      
  
  
Physical Therapy Gait   
Training                                Problem:PT Impaired   
gait  
Goal:Improved Gait                        
  
  
Completed                                 
  
                                          
  
  
Gait training and   
instruction to patient   
on safe ambulation   
with rollator walker   
for 2x's 40 feet with   
supervision, with   
verbal cues for   
corrections of gait   
deviations including   
staying closer to   
rollator.  
   
                                                      
  
  
Instruct and educate   
on knowledge deficits                   Problem:PT Learning   
Assessment  
Goal:Demonstrate   
understanding of   
education                                 
  
  
Completed                                 
  
                                          
  
  
patient verbalize   
and/or demonstrate   
understanding of   
physical therapy   
education including   
home exercise program.  
  
  
  
  
  
Education methods   
include: verbal cues   
and visual cues.  
  
  
  
  
  
Further education   
required to improve   
knowledge and   
compliance with home   
exercise program.  
   
                                                      
  
  
Medication Education  
  
  
Description:  
  
  
Evaluate/instruct   
patient/caregiver on   
obtaining, storing,   
identifying and   
administering ordered   
medications as well   
as keeping accurate   
medication list in   
the home and   
adhereing to   
medication schedule                     Problem:Medication   
Education  
Goal:Patient/caregive  
r will demonstrate   
ability to obtain,   
store, identify and   
administer ordered   
medications, keep   
accurate medication   
list in home, and   
adhere to medication   
schedule                                  
  
  
Completed                                 
  
                                          
  
  
Patient instructed on   
importance of keeping   
accurate medication   
list in home.  
   
                                                      
  
  
Risk of Sepsis  
  
  
Description:  
  
  
Patient is at risk   
for sepsis. Monitor   
closely for s/s of   
sepsis.                                 Problem:Sepsis  
Goal:Patient/caregive  
r will be able to   
identify and report   
symptoms of sepsis                        
  
  
Completed                                 
  
                                          
   
                                                      
  
  
SPO2  
  
  
Description:  
  
  
Notify Dr. Ahuja   
if pulse ox is <92%   
at rest.                                Problem:Physician   
Specific Parameters  
Goal:Patient to   
maintain parameters   
within   
physician-specified   
ranges throughout   
certification period                      
  
  
Completed                                 
  
                                          
   
                                                      
  
  
Instruct on   
individual fall risk   
factors and   
strategies to prevent   
falls and injuries   
caused by falls.                        Problem:Risk for   
Falls  
Goal:Manage Risk for   
falls                                     
  
  
Completed                                 
  
                                          
  
  
PT: Patient instructed   
on Managing Impaired   
Functional Mobility:   
Use assistive   
device(s): rollator   
walker  
  
  
   
                                                      
  
  
Instruct on pain and   
instruct on   
strategies to control   
pain                                    Problem:Pain  
Goal:Manage Pain                          
  
  
Completed                                 
  
                                          
  
  
patient instructed on   
techniques to control   
pain including   
Pharmacological   
measures and   
Non-Pharmacological   
measures; distraction.  
   
                                                      
  
  
Instruct on ongoing   
discharge plan                          Problem:Discharge  
Goal:Manage discharge   
planning                                  
  
  
Completed                                 
  
                                          
  
  
Ongoing Discharge   
plan: Discharge plan   
discussed with patient   
including frequency   
and duration for home   
PT and plan for   
transition to:   
caregiver assistance.  
  
  
  
documented in this encounter  
[facility]'s home Plan of care note* Visit Details  
  
                                                    Visit Type -PTA ROUTINE  
Discipline -Physical Therapy  
  
  
                                    Problems  
  
                    Problem   Description Start Date Status    Goals     Interve  
ntions  
   
                                                      
  
  
PT Impaired   
muscle   
performance   
and/or ROM  
  
  
Disciplines:  
  
  
PT                                                    
  
  
2022                                  
  
  
Active                                    
  
  
1 goal linked to   
scheduled/document  
ed intervention                           
  
  
1 goal   
intervention   
scheduled/document  
ed in this visit  
   
                                                      
  
  
PT Impaired gait  
  
  
Disciplines:  
  
  
PT                                                    
  
  
2022                                  
  
  
Active                                    
  
  
1 goal linked to   
scheduled/document  
ed intervention                           
  
  
1 goal   
intervention   
scheduled/document  
ed in this visit  
   
                                                      
  
  
PT Impaired   
balance  
  
  
Disciplines:  
  
  
PT                                                    
  
  
2022                                  
  
  
Active                                    
  
  
1 goal linked to   
scheduled/document  
ed intervention                           
  
  
1 goal   
intervention   
scheduled/document  
ed in this visit  
   
                                                      
  
  
PT Learning   
Assessment  
  
  
Disciplines:  
  
  
PT                                                    
  
  
2022                                  
  
  
Active                                    
  
  
1 goal linked to   
scheduled/document  
ed intervention                           
  
  
1 goal   
intervention   
scheduled/document  
ed in this visit  
   
                                                      
  
  
Medication   
Education  
  
  
Disciplines:  
  
  
Skilled Services                                      
  
  
2022                                  
  
  
Active                                    
  
  
1 goal linked to   
scheduled/document  
ed intervention                           
  
  
1 goal   
intervention   
scheduled/document  
ed in this visit  
   
                                                      
  
  
Sepsis  
  
  
Disciplines:  
  
  
Skilled Services                                      
  
  
2022                                  
  
  
Active                                    
  
  
1 goal linked to   
scheduled/document  
ed intervention                           
  
  
1 goal   
intervention   
scheduled/document  
ed in this visit  
   
                                                      
  
  
Physician   
Specific   
Parameters  
  
  
Disciplines:  
  
  
Skilled Services                                      
  
  
2022                                  
  
  
Active                                    
  
  
1 goal linked to   
scheduled/document  
ed intervention                           
  
  
1 goal   
intervention   
scheduled/document  
ed in this visit  
   
                                                      
  
  
Risk for Falls  
  
  
Disciplines:  
  
  
Skilled Services                                      
  
  
2022                                  
  
  
Active                                    
  
  
1 goal linked to   
scheduled/document  
ed intervention                           
  
  
1 goal   
intervention   
scheduled/document  
ed in this visit  
   
                                                      
  
  
Discharge  
  
  
Disciplines:  
  
  
Skilled Services                                      
  
  
2022                                  
  
  
Active                                    
  
  
1 goal linked to   
scheduled/document  
ed intervention                           
  
  
1 goal   
intervention   
scheduled/document  
ed in this visit  
  
  
                                      Goals  
  
                      Goal       Associated Problem Outcome    Goal Met?  Visit   
Notes  
   
                                                      
  
  
Improved Muscle Performance   
and/or ROM  
  
  
Description:  
  
  
LTG: Patient will demonstrate   
improved muscle performance   
to meet functional goals as   
evidenced by ability to   
tolerate 5 sit to stands in   
30 sec and tolerate 10 min of   
a standing activity, to be   
achieved by 10/1/22. .  
  
  
  
  
  
STG: Patient and/or caregiver   
will verbalize/demonstrate   
independence with home   
exercise program, to improve   
functional mobility, to be   
achieved by 9/10/22.  
  
  
  
  
  
LTG: Patient will demonstrate   
improved left hip active   
range of motion to 100* in   
standing degrees, to meet   
functional goals, to be   
achieved by 10/1/22. .                    
  
  
PT Impaired muscle   
performance and/or ROM                    
  
                                          
  
  
No                                        
   
                                                      
  
  
Improved Gait  
  
  
Description:  
  
  
  
  
  
LTG: Patient will demonstrate   
improved gait ability as   
evidenced by ambulation 150   
feet with rollator walker   
independently with AD, to   
return to safe household and   
community ambulation, in   
order to return to plf , to   
be achieved by 10/1/22. .  
  
  
  
  
                                          
  
  
PT Impaired gait                          
  
                                          
  
  
No                                        
   
                                                      
  
  
Improved Balance  
  
  
Description:  
  
  
  
  
  
LTG: Patient will demonstrate   
improved standing balance to   
meet functional goals as   
evidenced by TUG score of <28   
to be achieved by 10/1/22. .              
  
  
PT Impaired balance                       
  
                                          
  
  
No                                        
   
                                                      
  
  
Demonstrate understanding of   
education  
  
  
Description:  
  
  
Patient and/or caregiver will   
understand educational   
instruction to be achieved by   
10/1/22. .                                
  
  
PT Learning Assessment                    
  
                                          
  
  
No                                        
   
                                                      
  
  
Patient/caregiver will   
demonstrate ability to   
obtain, store, identify and   
administer ordered   
medications, keep accurate   
medication list in home, and   
adhere to medication schedule  
  
  
Description:  
  
  
Patient/caregiver will   
demonstrate ability to   
obtain, store, identify and   
administer ordered   
medications, keep accurate   
medication list in home, and   
adhere to medication schedule   
by 10/1/22.  
  
                                          
  
  
Medication Education                      
  
                                          
  
  
No                                        
   
                                                      
  
  
Patient/caregiver will be   
able to identify and report   
symptoms of sepsis  
  
  
Description:  
  
  
Patient/caregiver will be   
able to identify   
signs/symptoms of sepsis   
infection and will verbalize   
actions to take if suspected   
by 10/1/22. .                             
  
  
Sepsis                                    
  
                                          
  
  
No                                        
   
                                                      
  
  
Patient to maintain   
parameters within   
physician-specified ranges   
throughout certification   
period                                    
  
  
Physician Specific   
Parameters                                
  
                                          
  
  
No                                        
   
                                                      
  
  
Manage Risk for falls  
  
  
Description:  
  
  
Patient/caregiver will   
verbalize knowledge of   
individualized fall   
prevention strategies by   
10/1/22. .                                
  
  
Risk for Falls                            
  
                                          
  
  
No                                        
   
                                                      
  
  
Manage discharge planning  
  
  
Description:  
  
  
Patient/caregiver will   
verbalize understanding of   
ongoing discharge plan   
provided related to disease   
management, arrangements for   
outpatient and/or community   
services, obtaining   
medications, supplies, and   
DME, as needed throughout   
certification period.                     
  
  
Discharge                                 
  
                                          
  
  
No                                        
  
  
                                  Interventions  
  
                                        Intervention        Associated   
Problem/Goal        Status              Variance            Visit Notes  
   
                                                      
  
  
Physical Therapy   
Therapeutic Exercises                   Problem:PT Impaired   
muscle performance   
and/or ROM  
Goal:Improved Muscle   
Performance and/or   
ROM                                       
  
  
Completed                                 
  
                                          
  
  
patient instructed on   
strengthening   
exercises including   
seated: laq, hip   
flexion and adduction   
and heel toe raies   
x's 10 each. standing   
: hamstring curls and   
hip abduction x's 10   
each with verbal,   
visual and written   
cues for correct form   
and pace. patient   
instructed to perform   
home exercise program   
twice a day which   
included above   
exercises.  
   
                                                      
  
  
Physical Therapy Gait   
Training                                Problem:PT Impaired   
gait  
Goal:Improved Gait                        
  
  
Completed                                 
  
                                          
  
  
Gait training and   
instruction to   
patient on safe   
ambulation with   
rollator walker for   
2x's 80 feet with   
supervision, with   
verbal cues for   
corrections of gait   
deviations including   
pacing for safety and   
energy conservation.  
   
                                                      
  
  
Physical Therapy   
Balance Training                        Problem:PT Impaired   
balance  
Goal:Improved Balance                     
  
  
Completed                                 
  
                                          
  
  
Developed,   
implemented, and   
instructed patient on   
standing balance   
exercises including   
lateral stepping at   
counter 5feet x's 2   
w/ David UE support on   
counter .  
   
                                                      
  
  
Instruct and educate   
on knowledge deficits                   Problem:PT Learning   
Assessment  
Goal:Demonstrate   
understanding of   
education                                 
  
  
Completed                                 
  
                                          
  
  
patient verbalize   
and/or demonstrate   
understanding of   
physical therapy   
education including   
home exercise   
program.  
  
  
  
  
  
Education methods   
include: verbal cues,   
written instructions   
and visual cues.  
  
  
  
  
  
Further education   
required to improve   
knowledge and   
compliance with home   
exercise program.  
   
                                                      
  
  
Medication Education  
  
  
Description:  
  
  
Evaluate/instruct   
patient/caregiver on   
obtaining, storing,   
identifying and   
administering ordered   
medications as well as   
keeping accurate   
medication list in the   
home and adhereing to   
medication schedule                     Problem:Medication   
Education  
Goal:Patient/caregive  
r will demonstrate   
ability to obtain,   
store, identify and   
administer ordered   
medications, keep   
accurate medication   
list in home, and   
adhere to medication   
schedule                                  
  
  
Completed                                 
  
                                          
  
  
Patient instructed on   
importance of keeping   
accurate medication   
list in home.  
   
                                                      
  
  
Risk of Sepsis  
  
  
Description:  
  
  
Patient is at risk for   
sepsis. Monitor   
closely for s/s of   
sepsis.                                 Problem:Sepsis  
Goal:Patient/caregive  
r will be able to   
identify and report   
symptoms of sepsis                        
  
  
Completed                                 
  
                                          
   
                                                      
  
  
SPO2  
  
  
Description:  
  
  
Notify Dr. Ahuja   
if pulse ox is <92% at   
rest.                                   Problem:Physician   
Specific Parameters  
Goal:Patient to   
maintain parameters   
within   
physician-specified   
ranges throughout   
certification period                      
  
  
Completed                                 
  
                                          
   
                                                      
  
  
Instruct on individual   
fall risk factors and   
strategies to prevent   
falls and injuries   
caused by falls.                        Problem:Risk for   
Falls  
Goal:Manage Risk for   
falls                                     
  
  
Completed                                 
  
                                          
  
  
PT: Patient   
instructed on   
Managing Impaired   
Functional Mobility:   
Use assistive   
device(s): rollator   
walker  
  
  
   
                                                      
  
  
Instruct on ongoing   
discharge plan                          Problem:Discharge  
Goal:Manage discharge   
planning                                  
  
  
Completed                                 
  
                                          
  
  
Ongoing Discharge   
plan: Discharge plan   
discussed with   
patient including   
frequency and   
duration for home PT   
and plan for   
transition to: live   
independently at home   
without ongoing   
services.  
  
  
  
documented in this encounter  
[facility]'s home Plan of care note* Visit Details  
  
                                                    Visit Type -PT ROUTINE  
Discipline -Physical Therapy  
  
  
                                    Problems  
  
                    Problem   Description Start Date Status    Goals     Interve  
ntions  
   
                                                      
  
  
PT Impaired   
muscle   
performance   
and/or ROM  
  
  
Disciplines:  
  
  
PT                                                    
  
  
2022                                  
  
  
Active                                    
  
  
1 goal linked to   
scheduled/document  
ed intervention                           
  
  
1 goal   
intervention   
scheduled/document  
ed in this visit  
   
                                                      
  
  
PT Impaired   
mobility  
  
  
Disciplines:  
  
  
PT                                                    
  
  
2022                                  
  
  
Active                                    
  
  
1 goal linked to   
scheduled/document  
ed intervention                           
  
  
1 goal   
intervention   
scheduled/document  
ed in this visit  
   
                                                      
  
  
PT Impaired gait  
  
  
Disciplines:  
  
  
PT                                                    
  
  
2022                                  
  
  
Active                                    
  
  
1 goal linked to   
scheduled/document  
ed intervention                           
  
  
1 goal   
intervention   
scheduled/document  
ed in this visit  
   
                                                      
  
  
PT Learning   
Assessment  
  
  
Disciplines:  
  
  
PT                                                    
  
  
2022                                  
  
  
Active                                    
  
  
1 goal linked to   
scheduled/document  
ed intervention                           
  
  
1 goal   
intervention   
scheduled/document  
ed in this visit  
   
                                                      
  
  
Medication   
Education  
  
  
Disciplines:  
  
  
Skilled Services                                      
  
  
2022                                  
  
  
Active                                    
  
  
1 goal linked to   
scheduled/document  
ed intervention                           
  
  
1 goal   
intervention   
scheduled/document  
ed in this visit  
   
                                                      
  
  
Sepsis  
  
  
Disciplines:  
  
  
Skilled Services                                      
  
  
2022                                  
  
  
Active                                    
  
  
1 goal linked to   
scheduled/document  
ed intervention                           
  
  
1 goal   
intervention   
scheduled/document  
ed in this visit  
   
                                                      
  
  
Physician   
Specific   
Parameters  
  
  
Disciplines:  
  
  
Skilled Services                                      
  
  
2022                                  
  
  
Active                                    
  
  
1 goal linked to   
scheduled/document  
ed intervention                           
  
  
1 goal   
intervention   
scheduled/document  
ed in this visit  
   
                                                      
  
  
Risk for Falls  
  
  
Disciplines:  
  
  
Skilled Services                                      
  
  
2022                                  
  
  
Active                                    
  
  
1 goal linked to   
scheduled/document  
ed intervention                           
  
  
1 goal   
intervention   
scheduled/document  
ed in this visit  
   
                                                      
  
  
Pain  
  
  
Disciplines:  
  
  
Skilled Services                                      
  
  
2022                                  
  
  
Active                                    
  
  
1 goal linked to   
scheduled/document  
ed intervention                           
  
  
1 goal   
intervention   
scheduled/document  
ed in this visit  
  
  
                                      Goals  
  
                      Goal       Associated Problem Outcome    Goal Met?  Visit   
Notes  
   
                                                      
  
  
Improved Muscle Performance   
and/or ROM  
  
  
Description:  
  
  
LTG: Patient will demonstrate   
improved muscle performance   
to meet functional goals as   
evidenced by ability to   
tolerate 5 sit to stands in   
30 sec and tolerate 10 min of   
a standing activity, to be   
achieved by 10/1/22. .  
  
  
  
  
  
STG: Patient and/or caregiver   
will verbalize/demonstrate   
independence with home   
exercise program, to improve   
functional mobility, to be   
achieved by 9/10/22.  
  
  
  
  
  
LTG: Patient will demonstrate   
improved left hip active   
range of motion to 100* in   
standing degrees, to meet   
functional goals, to be   
achieved by 10/1/22. .                    
  
  
PT Impaired muscle   
performance and/or ROM                    
  
                                          
  
  
No                                        
   
                                                      
  
  
Improved Transfers  
  
  
Description:  
  
  
  
  
  
  
  
  
LTG: Patient will demonstrate   
safe transfers to/from bed,   
chair, toilet, shower/tub and   
car independently with AD, to   
be achieved by 10/1/22. .  
  
                                          
  
  
PT Impaired mobility                      
  
                                          
  
  
No                                        
   
                                                      
  
  
Improved Gait  
  
  
Description:  
  
  
  
  
  
LTG: Patient will demonstrate   
improved gait ability as   
evidenced by ambulation 150   
feet with rollator walker   
independently with AD, to   
return to safe household and   
community ambulation, in   
order to return to plf , to   
be achieved by 10/1/22. .  
  
  
  
  
                                          
  
  
PT Impaired gait                          
  
                                          
  
  
No                                        
   
                                                      
  
  
Demonstrate understanding of   
education  
  
  
Description:  
  
  
Patient and/or caregiver will   
understand educational   
instruction to be achieved by   
10/1/22. .                                
  
  
PT Learning Assessment                    
  
                                          
  
  
No                                        
   
                                                      
  
  
Patient/caregiver will   
demonstrate ability to   
obtain, store, identify and   
administer ordered   
medications, keep accurate   
medication list in home, and   
adhere to medication schedule  
  
  
Description:  
  
  
Patient/caregiver will   
demonstrate ability to   
obtain, store, identify and   
administer ordered   
medications, keep accurate   
medication list in home, and   
adhere to medication schedule   
by 10/1/22.  
  
                                          
  
  
Medication Education                      
  
                                          
  
  
No                                        
   
                                                      
  
  
Patient/caregiver will be   
able to identify and report   
symptoms of sepsis  
  
  
Description:  
  
  
Patient/caregiver will be   
able to identify   
signs/symptoms of sepsis   
infection and will verbalize   
actions to take if suspected   
by 10/1/22. .                             
  
  
Sepsis                                    
  
                                          
  
  
No                                        
   
                                                      
  
  
Patient to maintain   
parameters within   
physician-specified ranges   
throughout certification   
period                                    
  
  
Physician Specific   
Parameters                                
  
                                          
  
  
No                                        
   
                                                      
  
  
Manage Risk for falls  
  
  
Description:  
  
  
Patient/caregiver will   
verbalize knowledge of   
individualized fall   
prevention strategies by   
10/1/22. .                                
  
  
Risk for Falls                            
  
                                          
  
  
No                                        
   
                                                      
  
  
Manage Pain  
  
  
Description:  
  
  
Patient/caregiver will   
verbalize knowledge and   
understanding of appropriate   
techniques to control pain,   
including pain medication and   
non-pharmacological   
techniques. Patient will   
verbalize or demonstrate an   
acceptable level of pain as   
evidenced by a pain score of   
<5/10 and improvement in   
ability to perform activities   
of daily living to be   
achieved by 10/1/22. .                    
  
  
Pain                                      
  
                                          
  
  
No                                        
  
  
                                  Interventions  
  
                                        Intervention        Associated   
Problem/Goal        Status              Variance            Visit Notes  
   
                                                      
  
  
Physical Therapy   
Therapeutic Exercises                   Problem:PT Impaired   
muscle performance   
and/or ROM  
Goal:Improved Muscle   
Performance and/or   
ROM                                       
  
  
Completed                                 
  
                                          
  
  
patient instructed on   
strengthening   
exercises including   
Supine : GS,QS,HS,   
HIPADD, HIP BD, HEEL   
SLIDES, SAQ,BRIDGING X   
15  
  
  
SEATED: FAQ, HIP FLEX,   
,HIP ADD, HIP ABD AP   
X10 with blue T band  
  
  
with verbal and   
tactile cues for   
TECHNIQUE .  
  
  
   
                                                      
  
  
Physical Therapy   
Transfer Training                       Problem:PT Impaired   
mobility  
Goal:Improved   
Transfers                                 
  
  
Completed                                 
  
                                          
  
  
Transfer training and   
instruction to patient   
on safe transfers to   
and from bed and chair   
with supervision and   
verbal cues for   
technique  
   
                                                      
  
  
Physical Therapy Gait   
Training                                Problem:PT Impaired   
gait  
Goal:Improved Gait                        
  
  
Completed                                 
  
                                          
  
  
Gait training and   
instruction to patient   
on safe ambulation   
with front wheeled   
walker for 40' x 3   
feet with supervision,   
with verbal cues for   
corrections of gait   
deviations including   
picking up his feet   
and no shuffling .  
   
                                                      
  
  
Instruct and educate   
on knowledge deficits                   Problem:PT Learning   
Assessment  
Goal:Demonstrate   
understanding of   
education                                 
  
  
Completed                                 
  
                                          
  
  
patient verbalize   
and/or demonstrate   
understanding of   
physical therapy   
education including   
cardiac disease   
management, pulmonary   
disease management,   
pain management, fall   
prevention strategies,   
home safety and   
functional activity.  
  
  
  
  
  
Education methods   
include: verbal cues.  
  
  
  
  
  
Further education   
required to improve   
knowledge and   
compliance with fall   
prevention strategies,   
home safety,   
functional activity   
and home exercise   
program.  
   
                                                      
  
  
Medication Education  
  
  
Description:  
  
  
Evaluate/instruct   
patient/caregiver on   
obtaining, storing,   
identifying and   
administering ordered   
medications as well   
as keeping accurate   
medication list in   
the home and   
adhereing to   
medication schedule                     Problem:Medication   
Education  
Goal:Patient/caregive  
r will demonstrate   
ability to obtain,   
store, identify and   
administer ordered   
medications, keep   
accurate medication   
list in home, and   
adhere to medication   
schedule                                  
  
  
Completed                                 
  
                                          
  
  
Patient instructed on   
importance of keeping   
accurate medication   
list in home and   
adhering to medication   
schedule.  
   
                                                      
  
  
Risk of Sepsis  
  
  
Description:  
  
  
Patient is at risk   
for sepsis. Monitor   
closely for s/s of   
sepsis.                                 Problem:Sepsis  
Goal:Patient/caregive  
r will be able to   
identify and report   
symptoms of sepsis                        
  
  
Completed                                 
  
                                          
   
                                                      
  
  
SPO2  
  
  
Description:  
  
  
Notify Dr. Ahuja   
if pulse ox is <92%   
at rest.                                Problem:Physician   
Specific Parameters  
Goal:Patient to   
maintain parameters   
within   
physician-specified   
ranges throughout   
certification period                      
  
  
Completed                                 
  
                                          
   
                                                      
  
  
Instruct on   
individual fall risk   
factors and   
strategies to prevent   
falls and injuries   
caused by falls.                        Problem:Risk for   
Falls  
Goal:Manage Risk for   
falls                                     
  
  
Completed                                 
  
                                          
  
  
PT: Patient instructed   
on  
  
  
Eliminating   
Environmental Hazards:   
Keep pathways clear,   
Keep pets out of   
pathways, Remove   
unsafe rugs and Move   
furniture from   
pathways  
  
  
Managing Impaired   
Functional Mobility:   
Use assistive   
device(s): front   
wheeled walker  
  
  
Managing Pain  
  
  
  
  
  
   
                                                      
  
  
Instruct on pain and   
instruct on   
strategies to control   
pain                                    Problem:Pain  
Goal:Manage Pain                          
  
  
Completed                                 
  
                                          
  
  
patient instructed on   
techniques to control   
pain including   
Pharmacological   
measures and   
Non-Pharmacological   
measures; rest and   
positioning/elevation.  
  
documented in this encounter  
[facility]'s home Plan of care note* Visit Details  
  
                                                    Visit Type -PTA ROUTINE  
Discipline -Physical Therapy  
  
  
                                    Problems  
  
                    Problem   Description Start Date Status    Goals     Interve  
ntions  
   
                                                      
  
  
PT Impaired   
muscle   
performance   
and/or ROM  
  
  
Disciplines:  
  
  
PT                                                    
  
  
2022                                  
  
  
Active                                    
  
  
1 goal linked to   
scheduled/document  
ed intervention                           
  
  
1 goal   
intervention   
scheduled/document  
ed in this visit  
   
                                                      
  
  
PT Impaired   
mobility  
  
  
Disciplines:  
  
  
PT                                                    
  
  
2022                                  
  
  
Active                                    
  
  
1 goal linked to   
scheduled/document  
ed intervention                           
  
  
1 goal   
intervention   
scheduled/document  
ed in this visit  
   
                                                      
  
  
PT Impaired gait  
  
  
Disciplines:  
  
  
PT                                                    
  
  
2022                                  
  
  
Active                                    
  
  
1 goal linked to   
scheduled/document  
ed intervention                           
  
  
1 goal   
intervention   
scheduled/document  
ed in this visit  
   
                                                      
  
  
PT Impaired   
balance  
  
  
Disciplines:  
  
  
PT                                                    
  
  
2022                                  
  
  
Active                                    
  
  
1 goal linked to   
scheduled/document  
ed intervention                           
  
  
1 goal   
intervention   
scheduled/document  
ed in this visit  
   
                                                      
  
  
PT Learning   
Assessment  
  
  
Disciplines:  
  
  
PT                                                    
  
  
2022                                  
  
  
Active                                    
  
  
1 goal linked to   
scheduled/document  
ed intervention                           
  
  
1 goal   
intervention   
scheduled/document  
ed in this visit  
   
                                                      
  
  
Medication   
Education  
  
  
Disciplines:  
  
  
Skilled Services                                      
  
  
2022                                  
  
  
Active                                    
  
  
1 goal linked to   
scheduled/document  
ed intervention                           
  
  
1 goal   
intervention   
scheduled/document  
ed in this visit  
   
                                                      
  
  
Sepsis  
  
  
Disciplines:  
  
  
Skilled Services                                      
  
  
2022                                  
  
  
Active                                    
  
  
1 goal linked to   
scheduled/document  
ed intervention                           
  
  
1 goal   
intervention   
scheduled/document  
ed in this visit  
   
                                                      
  
  
Physician   
Specific   
Parameters  
  
  
Disciplines:  
  
  
Skilled Services                                      
  
  
2022                                  
  
  
Active                                    
  
  
1 goal linked to   
scheduled/document  
ed intervention                           
  
  
1 goal   
intervention   
scheduled/document  
ed in this visit  
   
                                                      
  
  
Risk for Falls  
  
  
Disciplines:  
  
  
Skilled Services                                      
  
  
2022                                  
  
  
Active                                    
  
  
1 goal linked to   
scheduled/document  
ed intervention                           
  
  
1 goal   
intervention   
scheduled/document  
ed in this visit  
   
                                                      
  
  
Pain  
  
  
Disciplines:  
  
  
Skilled Services                                      
  
  
2022                                  
  
  
Active                                    
  
  
1 goal linked to   
scheduled/document  
ed intervention                           
  
  
1 goal   
intervention   
scheduled/document  
ed in this visit  
   
                                                      
  
  
Discharge  
  
  
Disciplines:  
  
  
Skilled Services                                      
  
  
2022                                  
  
  
Active                                    
  
  
1 goal linked to   
scheduled/document  
ed intervention                           
  
  
1 goal   
intervention   
scheduled/document  
ed in this visit  
  
  
                                      Goals  
  
                      Goal       Associated Problem Outcome    Goal Met?  Visit   
Notes  
   
                                                      
  
  
Improved Muscle Performance   
and/or ROM  
  
  
Description:  
  
  
LTG: Patient will demonstrate   
improved muscle performance   
to meet functional goals as   
evidenced by ability to   
tolerate 5 sit to stands in   
30 sec and tolerate 10 min of   
a standing activity, to be   
achieved by 10/1/22. .  
  
  
  
  
  
STG: Patient and/or caregiver   
will verbalize/demonstrate   
independence with home   
exercise program, to improve   
functional mobility, to be   
achieved by 9/10/22.  
  
  
  
  
  
LTG: Patient will demonstrate   
improved left hip active   
range of motion to 100* in   
standing degrees, to meet   
functional goals, to be   
achieved by 10/1/22. .                    
  
  
PT Impaired muscle   
performance and/or ROM                    
  
                                          
  
  
No                                        
   
                                                      
  
  
Improved Transfers  
  
  
Description:  
  
  
  
  
  
  
  
  
LTG: Patient will demonstrate   
safe transfers to/from bed,   
chair, toilet, shower/tub and   
car independently with AD, to   
be achieved by 10/1/22. .  
  
                                          
  
  
PT Impaired mobility                      
  
                                          
  
  
No                                        
   
                                                      
  
  
Improved Gait  
  
  
Description:  
  
  
  
  
  
LTG: Patient will demonstrate   
improved gait ability as   
evidenced by ambulation 150   
feet with rollator walker   
independently with AD, to   
return to safe household and   
community ambulation, in   
order to return to plf , to   
be achieved by 10/1/22. .  
  
  
  
  
                                          
  
  
PT Impaired gait                          
  
                                          
  
  
No                                        
   
                                                      
  
  
Improved Balance  
  
  
Description:  
  
  
  
  
  
LTG: Patient will demonstrate   
improved standing balance to   
meet functional goals as   
evidenced by TUG score of <28   
to be achieved by 10/1/22. .              
  
  
PT Impaired balance                       
  
                                          
  
  
No                                        
   
                                                      
  
  
Demonstrate understanding of   
education  
  
  
Description:  
  
  
Patient and/or caregiver will   
understand educational   
instruction to be achieved by   
10/1/22. .                                
  
  
PT Learning Assessment                    
  
                                          
  
  
No                                        
   
                                                      
  
  
Patient/caregiver will   
demonstrate ability to   
obtain, store, identify and   
administer ordered   
medications, keep accurate   
medication list in home, and   
adhere to medication schedule  
  
  
Description:  
  
  
Patient/caregiver will   
demonstrate ability to   
obtain, store, identify and   
administer ordered   
medications, keep accurate   
medication list in home, and   
adhere to medication schedule   
by 10/1/22.  
  
                                          
  
  
Medication Education                      
  
                                          
  
  
No                                        
   
                                                      
  
  
Patient/caregiver will be   
able to identify and report   
symptoms of sepsis  
  
  
Description:  
  
  
Patient/caregiver will be   
able to identify   
signs/symptoms of sepsis   
infection and will verbalize   
actions to take if suspected   
by 10/1/22. .                             
  
  
Sepsis                                    
  
                                          
  
  
No                                        
   
                                                      
  
  
Patient to maintain   
parameters within   
physician-specified ranges   
throughout certification   
period                                    
  
  
Physician Specific   
Parameters                                
  
                                          
  
  
No                                        
   
                                                      
  
  
Manage Risk for falls  
  
  
Description:  
  
  
Patient/caregiver will   
verbalize knowledge of   
individualized fall   
prevention strategies by   
10/1/22. .                                
  
  
Risk for Falls                            
  
                                          
  
  
No                                        
   
                                                      
  
  
Manage Pain  
  
  
Description:  
  
  
Patient/caregiver will   
verbalize knowledge and   
understanding of appropriate   
techniques to control pain,   
including pain medication and   
non-pharmacological   
techniques. Patient will   
verbalize or demonstrate an   
acceptable level of pain as   
evidenced by a pain score of   
<5/10 and improvement in   
ability to perform activities   
of daily living to be   
achieved by 10/1/22. .                    
  
  
Pain                                      
  
                                          
  
  
No                                        
   
                                                      
  
  
Manage discharge planning  
  
  
Description:  
  
  
Patient/caregiver will   
verbalize understanding of   
ongoing discharge plan   
provided related to disease   
management, arrangements for   
outpatient and/or community   
services, obtaining   
medications, supplies, and   
DME, as needed throughout   
certification period.                     
  
  
Discharge                                 
  
                                          
  
  
No                                        
  
  
                                  Interventions  
  
                                        Intervention        Associated   
Problem/Goal        Status              Variance            Visit Notes  
   
                                                      
  
  
Physical Therapy   
Therapeutic Exercises                   Problem:PT Impaired   
muscle performance   
and/or ROM  
Goal:Improved Muscle   
Performance and/or   
ROM                                       
  
  
Completed                                 
  
                                          
  
  
patient instructed on   
strengthening   
exercises including   
seated laq, hip   
flexion and   
adduction, heel toe   
raises x's 10 each   
with verbal and   
visual cues for   
correct form. patient   
instructed to perform   
home exercise program   
daily which included   
above exercises.  
   
                                                      
  
  
Physical Therapy   
Transfer Training                       Problem:PT Impaired   
mobility  
Goal:Improved   
Transfers                                 
  
  
Completed                                 
  
                                          
  
  
Transfer training and   
instruction to   
patient on safe   
transfers to and from   
chair and chair lift   
with supervision and   
verbal cues for   
safety.  
   
                                                      
  
  
Physical Therapy Gait   
Training                                Problem:PT Impaired   
gait  
Goal:Improved Gait                        
  
  
Completed                                 
  
                                          
  
  
Gait training and   
instruction to   
patient on safe   
ambulation with   
rollator walker for   
2x's125 feet with   
stand by assist, with   
verbal cues for   
corrections of gait   
deviations including   
inceased step height   
and for staying   
closer to rollator. .  
   
                                                      
  
  
Physical Therapy   
Balance Training                        Problem:PT Impaired   
balance  
Goal:Improved Balance                     
  
  
Completed                                 
  
                                          
  
  
Developed,   
implemented, and   
instructed patient on   
standing balance   
exercises including .  
   
                                                      
  
  
Instruct and educate   
on knowledge deficits                   Problem:PT Learning   
Assessment  
Goal:Demonstrate   
understanding of   
education                                 
  
  
Completed                                 
  
                                          
  
  
patient verbalize   
and/or demonstrate   
understanding of   
physical therapy   
education including   
home exercise   
program.  
  
  
  
  
  
Education methods   
include: verbal cues.  
  
  
  
  
  
Further education   
required to improve   
knowledge and   
compliance with home   
exercise program.  
   
                                                      
  
  
Medication Education  
  
  
Description:  
  
  
Evaluate/instruct   
patient/caregiver on   
obtaining, storing,   
identifying and   
administering ordered   
medications as well as   
keeping accurate   
medication list in the   
home and adhereing to   
medication schedule                     Problem:Medication   
Education  
Goal:Patient/caregive  
r will demonstrate   
ability to obtain,   
store, identify and   
administer ordered   
medications, keep   
accurate medication   
list in home, and   
adhere to medication   
schedule                                  
  
  
Completed                                 
  
                                          
  
  
Patient instructed on   
importance of keeping   
accurate medication   
list in home.  
   
                                                      
  
  
Risk of Sepsis  
  
  
Description:  
  
  
Patient is at risk for   
sepsis. Monitor   
closely for s/s of   
sepsis.                                 Problem:Sepsis  
Goal:Patient/caregive  
r will be able to   
identify and report   
symptoms of sepsis                        
  
  
Completed                                 
  
                                          
   
                                                      
  
  
SPO2  
  
  
Description:  
  
  
Notify Dr. Ahuja   
if pulse ox is <92% at   
rest.                                   Problem:Physician   
Specific Parameters  
Goal:Patient to   
maintain parameters   
within   
physician-specified   
ranges throughout   
certification period                      
  
  
Completed                                 
  
                                          
   
                                                      
  
  
Instruct on individual   
fall risk factors and   
strategies to prevent   
falls and injuries   
caused by falls.                        Problem:Risk for   
Falls  
Goal:Manage Risk for   
falls                                     
  
  
Completed                                 
  
                                          
  
  
PT: Patient   
instructed on   
Managing Impaired   
Functional Mobility:   
Use assistive   
device(s): front   
wheeled walker  
  
  
   
                                                      
  
  
Instruct on pain and   
instruct on strategies   
to control pain                         Problem:Pain  
Goal:Manage Pain                          
  
  
Completed                                 
  
                                          
  
  
patient instructed on   
techniques to control   
pain including   
Non-Pharmacological   
measures;   
breathing/relaxation.  
   
                                                      
  
  
Instruct on ongoing   
discharge plan                          Problem:Discharge  
Goal:Manage discharge   
planning                                  
  
  
Completed                                 
  
                                          
  
  
Ongoing Discharge   
plan: Discharge plan   
discussed with   
patient including   
frequency and   
duration for home PT   
and plan for   
transition to: live   
independently at home   
without ongoing   
services.  
  
  
  
documented in this encounter  
[facility] Details  
  
                                                    Visit Type -PT ROUTINE  
Discipline -Physical Therapy  
  
  
                                    Problems  
  
                    Problem   Description Start Date Status    Goals     Interve  
ntions  
   
                                                      
  
  
PT Impaired   
muscle   
performance   
and/or ROM  
  
  
Disciplines:  
  
  
PT                                                    
  
  
2022                                  
  
  
Active                                    
  
  
1 goal linked to   
scheduled/document  
ed intervention                           
  
  
1 goal   
intervention   
scheduled/document  
ed in this visit  
   
                                                      
  
  
PT Impaired   
mobility  
  
  
Disciplines:  
  
  
PT                                                    
  
  
2022                                  
  
  
Active                                    
  
  
1 goal linked to   
scheduled/document  
ed intervention                           
  
  
1 goal   
intervention   
scheduled/document  
ed in this visit  
   
                                                      
  
  
PT Impaired gait  
  
  
Disciplines:  
  
  
PT                                                    
  
  
2022                                  
  
  
Active                                    
  
  
1 goal linked to   
scheduled/document  
ed intervention                           
  
  
1 goal   
intervention   
scheduled/document  
ed in this visit  
   
                                                      
  
  
PT Impaired   
balance  
  
  
Disciplines:  
  
  
PT                                                    
  
  
2022                                  
  
  
Active                                    
  
  
1 goal linked to   
scheduled/document  
ed intervention                           
  
  
1 goal   
intervention   
scheduled/document  
ed in this visit  
   
                                                      
  
  
PT Learning   
Assessment  
  
  
Disciplines:  
  
  
PT                                                    
  
  
2022                                  
  
  
Active                                    
  
  
1 goal linked to   
scheduled/document  
ed intervention                           
  
  
1 goal   
intervention   
scheduled/document  
ed in this visit  
   
                                                      
  
  
Medication   
Education  
  
  
Disciplines:  
  
  
Skilled Services                                      
  
  
2022                                  
  
  
Active                                    
  
  
1 goal linked to   
scheduled/document  
ed intervention                           
  
  
1 goal   
intervention   
scheduled/document  
ed in this visit  
   
                                                      
  
  
Sepsis  
  
  
Disciplines:  
  
  
Skilled Services                                      
  
  
2022                                  
  
  
Active                                    
  
  
1 goal linked to   
scheduled/document  
ed intervention                           
  
  
1 goal   
intervention   
scheduled/document  
ed in this visit  
   
                                                      
  
  
Physician   
Specific   
Parameters  
  
  
Disciplines:  
  
  
Skilled Services                                      
  
  
2022                                  
  
  
Active                                    
  
  
1 goal linked to   
scheduled/document  
ed intervention                           
  
  
1 goal   
intervention   
scheduled/document  
ed in this visit  
   
                                                      
  
  
Risk for Falls  
  
  
Disciplines:  
  
  
Skilled Services                                      
  
  
2022                                  
  
  
Active                                    
  
  
1 goal linked to   
scheduled/document  
ed intervention                           
  
  
1 goal   
intervention   
scheduled/document  
ed in this visit  
  
  
                                      Goals  
  
                      Goal       Associated Problem Outcome    Goal Met?  Visit   
Notes  
   
                                                      
  
  
Improved Muscle Performance   
and/or ROM  
  
  
Description:  
  
  
LTG: Patient will demonstrate   
improved muscle performance   
to meet functional goals as   
evidenced by ability to   
tolerate 5 sit to stands in   
30 sec and tolerate 10 min of   
a standing activity, to be   
achieved by 10/1/22. .  
  
  
  
  
  
STG: Patient and/or caregiver   
will verbalize/demonstrate   
independence with home   
exercise program, to improve   
functional mobility, to be   
achieved by 9/10/22.  
  
  
  
  
  
LTG: Patient will demonstrate   
improved left hip active   
range of motion to 100* in   
standing degrees, to meet   
functional goals, to be   
achieved by 10/1/22. .                    
  
  
PT Impaired muscle   
performance and/or ROM                    
  
                                          
  
  
No                                        
   
                                                      
  
  
Improved Transfers  
  
  
Description:  
  
  
  
  
  
  
  
  
LTG: Patient will demonstrate   
safe transfers to/from bed,   
chair, toilet, shower/tub and   
car independently with AD, to   
be achieved by 10/1/22. .  
  
                                          
  
  
PT Impaired mobility                      
  
                                          
  
  
No                                        
   
                                                      
  
  
Improved Gait  
  
  
Description:  
  
  
  
  
  
LTG: Patient will demonstrate   
improved gait ability as   
evidenced by ambulation 150   
feet with rollator walker   
independently with AD, to   
return to safe household and   
community ambulation, in   
order to return to plf , to   
be achieved by 10/1/22. .  
  
  
  
  
                                          
  
  
PT Impaired gait                          
  
                                          
  
  
No                                        
   
                                                      
  
  
Improved Balance  
  
  
Description:  
  
  
  
  
  
LTG: Patient will demonstrate   
improved standing balance to   
meet functional goals as   
evidenced by TUG score of <28   
to be achieved by 10/1/22. .              
  
  
PT Impaired balance                       
  
                                          
  
  
No                                        
   
                                                      
  
  
Demonstrate understanding of   
education  
  
  
Description:  
  
  
Patient and/or caregiver will   
understand educational   
instruction to be achieved by   
10/1/22. .                                
  
  
PT Learning Assessment                    
  
                                          
  
  
No                                        
   
                                                      
  
  
Patient/caregiver will   
demonstrate ability to   
obtain, store, identify and   
administer ordered   
medications, keep accurate   
medication list in home, and   
adhere to medication schedule  
  
  
Description:  
  
  
Patient/caregiver will   
demonstrate ability to   
obtain, store, identify and   
administer ordered   
medications, keep accurate   
medication list in home, and   
adhere to medication schedule   
by 10/1/22.  
  
                                          
  
  
Medication Education                      
  
                                          
  
  
No                                        
   
                                                      
  
  
Patient/caregiver will be   
able to identify and report   
symptoms of sepsis  
  
  
Description:  
  
  
Patient/caregiver will be   
able to identify   
signs/symptoms of sepsis   
infection and will verbalize   
actions to take if suspected   
by 10/1/22. .                             
  
  
Sepsis                                    
  
                                          
  
  
No                                        
   
                                                      
  
  
Patient to maintain   
parameters within   
physician-specified ranges   
throughout certification   
period                                    
  
  
Physician Specific   
Parameters                                
  
                                          
  
  
No                                        
   
                                                      
  
  
Manage Risk for falls  
  
  
Description:  
  
  
Patient/caregiver will   
verbalize knowledge of   
individualized fall   
prevention strategies by   
10/1/22. .                                
  
  
Risk for Falls                            
  
                                          
  
  
No                                        
  
  
                                  Interventions  
  
                                        Intervention        Associated   
Problem/Goal        Status              Variance            Visit Notes  
   
                                                      
  
  
Physical Therapy   
Therapeutic Exercises                   Problem:PT Impaired   
muscle performance   
and/or ROM  
Goal:Improved Muscle   
Performance and/or   
ROM                                       
  
  
Completed                                 
  
                                          
  
  
patient instructed on   
strengthening   
exercises including   
Supine : GS,QS,HS,   
HIPADD, HIP BD, HEEL   
SLIDES, SAQ,BRIDGING   
X 15  
  
  
SEATED: FAQ, HIP   
FLEX, ,HIP ADD, HIP   
ABD AP X10 with blue   
T band  
  
  
Standing: heel raises   
( unable to do) , Toe   
raises, knee flexion,   
hip flexionX 10 reps  
  
  
with verbal and   
tactile cues for   
TECHNIQUE .  
  
  
  
  
  
noticeably stronger   
with the supine ther   
ex, seated ther ex   
difficult with t   
band, struglled   
greatly with standing   
ther ex, Unable to   
lift heels off floor   
with heel raises  
   
                                                      
  
  
Physical Therapy   
Transfer Training                       Problem:PT Impaired   
mobility  
Goal:Improved   
Transfers                                 
  
  
Completed                                 
  
                                          
  
  
Transfer training and   
instruction to   
patient on safe   
transfers to and from   
bed and chair with   
supervision and   
verbal cues for cues.  
   
                                                      
  
  
Physical Therapy Gait   
Training                                Problem:PT Impaired   
gait  
Goal:Improved Gait                        
  
  
Completed                                 
  
                                          
  
  
Gait training and   
instruction to   
patient on safe   
ambulation with front   
wheeled walker for   
50' x 2 feet with   
stand by assist, with   
tactile cues for   
corrections of gait   
deviations including   
picking up feet,   
cannot toe off due to   
calf weakness .  
   
                                                      
  
  
Physical Therapy   
Balance Training                        Problem:PT Impaired   
balance  
Goal:Improved Balance                     
  
  
Completed                                 
  
                                          
  
  
Developed,   
implemented, and   
instructed patient on   
standing balance   
exercises including   
standing ther ex.  
   
                                                      
  
  
Instruct and educate   
on knowledge deficits                   Problem:PT Learning   
Assessment  
Goal:Demonstrate   
understanding of   
education                                 
  
  
Completed                                 
  
                                          
  
  
patient verbalize   
and/or demonstrate   
understanding of   
physical therapy   
education including   
functional activity   
and home exercise   
program.  
  
  
  
  
  
Education methods   
include: verbal cues.  
  
  
  
  
  
Further education   
required to improve   
knowledge and   
compliance with fall   
prevention   
strategies, home   
safety, functional   
activity and home   
exercise program.  
   
                                                      
  
  
Medication Education  
  
  
Description:  
  
  
Evaluate/instruct   
patient/caregiver on   
obtaining, storing,   
identifying and   
administering ordered   
medications as well as   
keeping accurate   
medication list in the   
home and adhereing to   
medication schedule                     Problem:Medication   
Education  
Goal:Patient/caregive  
r will demonstrate   
ability to obtain,   
store, identify and   
administer ordered   
medications, keep   
accurate medication   
list in home, and   
adhere to medication   
schedule                                  
  
  
Completed                                 
  
                                          
  
  
Patient instructed on   
importance of keeping   
accurate medication   
list in home and   
adhering to   
medication schedule.  
   
                                                      
  
  
Risk of Sepsis  
  
  
Description:  
  
  
Patient is at risk for   
sepsis. Monitor   
closely for s/s of   
sepsis.                                 Problem:Sepsis  
Goal:Patient/caregive  
r will be able to   
identify and report   
symptoms of sepsis                        
  
  
Completed                                 
  
                                          
   
                                                      
  
  
SPO2  
  
  
Description:  
  
  
Notify Dr. Ahuja   
if pulse ox is <92% at   
rest.                                   Problem:Physician   
Specific Parameters  
Goal:Patient to   
maintain parameters   
within   
physician-specified   
ranges throughout   
certification period                      
  
  
Completed                                 
  
                                          
   
                                                      
  
  
Instruct on individual   
fall risk factors and   
strategies to prevent   
falls and injuries   
caused by falls.                        Problem:Risk for   
Falls  
Goal:Manage Risk for   
falls                                     
  
  
Completed                                 
  
                                          
  
  
PT: Patient   
instructed on  
  
  
Eliminating   
Environmental   
Hazards: Keep   
pathways clear, Keep   
pets out of pathways,   
Remove unsafe rugs,   
Move furniture from   
pathways and Wear   
supportive shoes or   
non-skid socks  
  
  
Managing Impaired   
Functional Mobility:   
Use assistive   
device(s): front   
wheeled walker  
  
  
Managing Pain  
  
  
  
  
  
  
documented in this encounter  
[facility]'s home Plan of care note* Visit Details  
  
                                                    Visit Type -PTA ROUTINE  
Discipline -Physical Therapy  
  
  
                                    Problems  
  
                    Problem   Description Start Date Status    Goals     Interve  
ntions  
   
                                                      
  
  
PT Impaired   
muscle   
performance   
and/or ROM  
  
  
Disciplines:  
  
  
PT                                                    
  
  
2022                                  
  
  
Active                                    
  
  
1 goal linked to   
scheduled/document  
ed intervention                           
  
  
1 goal   
intervention   
scheduled/document  
ed in this visit  
   
                                                      
  
  
PT Impaired gait  
  
  
Disciplines:  
  
  
PT                                                    
  
  
2022                                  
  
  
Active                                    
  
  
1 goal linked to   
scheduled/document  
ed intervention                           
  
  
1 goal   
intervention   
scheduled/document  
ed in this visit  
   
                                                      
  
  
PT Impaired   
balance  
  
  
Disciplines:  
  
  
PT                                                    
  
  
2022                                  
  
  
Active                                    
  
  
1 goal linked to   
scheduled/document  
ed intervention                           
  
  
1 goal   
intervention   
scheduled/document  
ed in this visit  
   
                                                      
  
  
PT Learning   
Assessment  
  
  
Disciplines:  
  
  
PT                                                    
  
  
2022                                  
  
  
Active                                    
  
  
1 goal linked to   
scheduled/document  
ed intervention                           
  
  
1 goal   
intervention   
scheduled/document  
ed in this visit  
   
                                                      
  
  
Medication   
Education  
  
  
Disciplines:  
  
  
Skilled Services                                      
  
  
2022                                  
  
  
Active                                    
  
  
1 goal linked to   
scheduled/document  
ed intervention                           
  
  
1 goal   
intervention   
scheduled/document  
ed in this visit  
   
                                                      
  
  
Sepsis  
  
  
Disciplines:  
  
  
Skilled Services                                      
  
  
2022                                  
  
  
Active                                    
  
  
1 goal linked to   
scheduled/document  
ed intervention                           
  
  
1 goal   
intervention   
scheduled/document  
ed in this visit  
   
                                                      
  
  
Physician   
Specific   
Parameters  
  
  
Disciplines:  
  
  
Skilled Services                                      
  
  
2022                                  
  
  
Active                                    
  
  
1 goal linked to   
scheduled/document  
ed intervention                           
  
  
1 goal   
intervention   
scheduled/document  
ed in this visit  
   
                                                      
  
  
Risk for Falls  
  
  
Disciplines:  
  
  
Skilled Services                                      
  
  
2022                                  
  
  
Active                                    
  
  
1 goal linked to   
scheduled/document  
ed intervention                           
  
  
1 goal   
intervention   
scheduled/document  
ed in this visit  
   
                                                      
  
  
Pain  
  
  
Disciplines:  
  
  
Skilled Services                                      
  
  
2022                                  
  
  
Active                                    
  
  
1 goal linked to   
scheduled/document  
ed intervention                           
  
  
1 goal   
intervention   
scheduled/document  
ed in this visit  
   
                                                      
  
  
Discharge  
  
  
Disciplines:  
  
  
Skilled Services                                      
  
  
2022                                  
  
  
Active                                    
  
  
1 goal linked to   
scheduled/document  
ed intervention                           
  
  
2 goal   
interventions   
scheduled/document  
ed in this visit  
  
  
                                      Goals  
  
                      Goal       Associated Problem Outcome    Goal Met?  Visit   
Notes  
   
                                                      
  
  
Improved Muscle Performance   
and/or ROM  
  
  
Description:  
  
  
LTG: Patient will demonstrate   
improved muscle performance   
to meet functional goals as   
evidenced by ability to   
tolerate 5 sit to stands in   
30 sec and tolerate 10 min of   
a standing activity, to be   
achieved by 10/1/22. .  
  
  
  
  
  
STG: Patient and/or caregiver   
will verbalize/demonstrate   
independence with home   
exercise program, to improve   
functional mobility, to be   
achieved by 9/10/22.  
  
  
  
  
  
LTG: Patient will demonstrate   
improved left hip active   
range of motion to 100* in   
standing degrees, to meet   
functional goals, to be   
achieved by 10/1/22. .                    
  
  
PT Impaired muscle   
performance and/or ROM                    
  
                                          
  
  
No                                        
   
                                                      
  
  
Improved Gait  
  
  
Description:  
  
  
  
  
  
LTG: Patient will demonstrate   
improved gait ability as   
evidenced by ambulation 150   
feet with rollator walker   
independently with AD, to   
return to safe household and   
community ambulation, in   
order to return to plf , to   
be achieved by 10/1/22. .  
  
  
  
  
                                          
  
  
PT Impaired gait                          
  
                                          
  
  
No                                        
   
                                                      
  
  
Improved Balance  
  
  
Description:  
  
  
  
  
  
LTG: Patient will demonstrate   
improved standing balance to   
meet functional goals as   
evidenced by TUG score of <28   
to be achieved by 10/1/22. .              
  
  
PT Impaired balance                       
  
                                          
  
  
No                                        
   
                                                      
  
  
Demonstrate understanding of   
education  
  
  
Description:  
  
  
Patient and/or caregiver will   
understand educational   
instruction to be achieved by   
10/1/22. .                                
  
  
PT Learning Assessment                    
  
                                          
  
  
No                                        
   
                                                      
  
  
Patient/caregiver will   
demonstrate ability to   
obtain, store, identify and   
administer ordered   
medications, keep accurate   
medication list in home, and   
adhere to medication schedule  
  
  
Description:  
  
  
Patient/caregiver will   
demonstrate ability to   
obtain, store, identify and   
administer ordered   
medications, keep accurate   
medication list in home, and   
adhere to medication schedule   
by 10/1/22.  
  
                                          
  
  
Medication Education                      
  
                                          
  
  
No                                        
   
                                                      
  
  
Patient/caregiver will be   
able to identify and report   
symptoms of sepsis  
  
  
Description:  
  
  
Patient/caregiver will be   
able to identify   
signs/symptoms of sepsis   
infection and will verbalize   
actions to take if suspected   
by 10/1/22. .                             
  
  
Sepsis                                    
  
                                          
  
  
No                                        
   
                                                      
  
  
Patient to maintain   
parameters within   
physician-specified ranges   
throughout certification   
period                                    
  
  
Physician Specific   
Parameters                                
  
                                          
  
  
No                                        
   
                                                      
  
  
Manage Risk for falls  
  
  
Description:  
  
  
Patient/caregiver will   
verbalize knowledge of   
individualized fall   
prevention strategies by   
10/1/22. .                                
  
  
Risk for Falls                            
  
                                          
  
  
No                                        
   
                                                      
  
  
Manage Pain  
  
  
Description:  
  
  
Patient/caregiver will   
verbalize knowledge and   
understanding of appropriate   
techniques to control pain,   
including pain medication and   
non-pharmacological   
techniques. Patient will   
verbalize or demonstrate an   
acceptable level of pain as   
evidenced by a pain score of   
<5/10 and improvement in   
ability to perform activities   
of daily living to be   
achieved by 10/1/22. .                    
  
  
Pain                                      
  
                                          
  
  
No                                        
   
                                                      
  
  
Manage discharge planning  
  
  
Description:  
  
  
Patient/caregiver will   
verbalize understanding of   
ongoing discharge plan   
provided related to disease   
management, arrangements for   
outpatient and/or community   
services, obtaining   
medications, supplies, and   
DME, as needed throughout   
certification period.                     
  
  
Discharge                                 
  
                                          
  
  
No                                        
  
  
                                  Interventions  
  
                                        Intervention        Associated   
Problem/Goal        Status              Variance            Visit Notes  
   
                                                      
  
  
Physical Therapy   
Therapeutic Exercises                   Problem:PT Impaired   
muscle performance   
and/or ROM  
Goal:Improved Muscle   
Performance and/or   
ROM                                       
  
  
Completed                                 
  
                                          
  
  
patient instructed on   
strengthening   
exercises including   
seated blue tband hip   
abd and flexion, laq   
and heel toe raises   
x's 1 each. standing   
toe raises, hip abd   
and flexion,   
hamstring curls x's   
15 each. with verbal   
and visual cues for   
correct form and   
pace. patient   
instructed to perform   
home exercise program   
twice a day which   
included above   
exercises .  
   
                                                      
  
  
Physical Therapy Gait   
Training                                Problem:PT Impaired   
gait  
Goal:Improved Gait                        
  
  
Completed                                 
  
                                          
  
  
Gait training and   
instruction to   
patient on safe   
ambulation with front   
wheeled walker for   
2x's50 feet with   
supervision, with   
verbal cues for   
corrections of gait   
deviations including   
posture and staying   
c;loser to ww.  
   
                                                      
  
  
Physical Therapy   
Balance Training                        Problem:PT Impaired   
balance  
Goal:Improved Balance                     
  
  
Completed                                 
  
                                          
  
  
Developed,   
implemented, and   
instructed patient on   
standing balance   
exercises including   
lateral stepping at   
counter 5feet x's 4   
w/ David UE support.   
step over w/ww   
3concesx's 2laps   
w/ww.  
   
                                                      
  
  
Instruct and educate   
on knowledge deficits                   Problem:PT Learning   
Assessment  
Goal:Demonstrate   
understanding of   
education                                 
  
  
Completed                                 
  
                                          
  
  
patient verbalize   
and/or demonstrate   
understanding of   
physical therapy   
education including   
home exercise   
program.  
  
  
  
  
  
Education methods   
include: verbal cues   
and visual cues.  
  
  
  
  
  
Further education   
required to improve   
knowledge and   
compliance with home   
exercise program.  
   
                                                      
  
  
Medication Education  
  
  
Description:  
  
  
Evaluate/instruct   
patient/caregiver on   
obtaining, storing,   
identifying and   
administering ordered   
medications as well as   
keeping accurate   
medication list in the   
home and adhereing to   
medication schedule                     Problem:Medication   
Education  
Goal:Patient/caregive  
r will demonstrate   
ability to obtain,   
store, identify and   
administer ordered   
medications, keep   
accurate medication   
list in home, and   
adhere to medication   
schedule                                  
  
  
Completed                                 
  
                                          
  
  
Patient instructed on   
importance of keeping   
accurate medication   
list in home.  
   
                                                      
  
  
Risk of Sepsis  
  
  
Description:  
  
  
Patient is at risk for   
sepsis. Monitor   
closely for s/s of   
sepsis.                                 Problem:Sepsis  
Goal:Patient/caregive  
r will be able to   
identify and report   
symptoms of sepsis                        
  
  
Completed                                 
  
                                          
   
                                                      
  
  
SPO2  
  
  
Description:  
  
  
Notify Dr. Ahuja   
if pulse ox is <92% at   
rest.                                   Problem:Physician   
Specific Parameters  
Goal:Patient to   
maintain parameters   
within   
physician-specified   
ranges throughout   
certification period                      
  
  
Completed                                 
  
                                          
   
                                                      
  
  
Instruct on individual   
fall risk factors and   
strategies to prevent   
falls and injuries   
caused by falls.                        Problem:Risk for   
Falls  
Goal:Manage Risk for   
falls                                     
  
  
Completed                                 
  
                                          
  
  
PT: Patient   
instructed on   
Managing Impaired   
Functional Mobility:   
Use assistive   
device(s): front   
wheeled walker  
  
  
   
                                                      
  
  
Instruct on pain and   
instruct on strategies   
to control pain                         Problem:Pain  
Goal:Manage Pain                          
  
  
Completed                                 
  
                                          
  
  
patient instructed on   
techniques to control   
pain including   
Non-Pharmacological   
measures; rest.  
   
                                                      
  
  
Deliver [facility]                           Problem:Discharge  
Goal:Manage discharge   
planning                                  
  
  
Completed                                 
  
                                          
  
  
Delivered [facility] on   
22 for discharge   
date of 22   
Patient denies   
questions/concerns w/   
form .  
   
                                                      
  
  
Instruct on ongoing   
discharge plan                          Problem:Discharge  
Goal:Manage discharge   
planning                                  
  
  
Completed                                 
  
                                          
  
  
Ongoing Discharge   
plan: Discharge plan   
discussed with   
patient including   
frequency and   
duration for home PT   
and plan for   
transition to: live   
independently at home   
without ongoing   
services.  
  
  
  
documented in this encounter  
[facility]'s home Plan of care note* Visit Details  
  
                                                    Visit Type -PT REASSESSMENT  
Discipline -Physical Therapy  
  
  
                                    Problems  
  
                    Problem   Description Start Date Status    Goals     Interve  
ntions  
   
                                                      
  
  
PT Impaired   
muscle   
performance   
and/or ROM  
  
  
Disciplines:  
  
  
PT                                                    
  
  
2022                                  
  
  
Active                                    
  
  
1 goal linked to   
scheduled/document  
ed intervention                           
  
  
1 goal   
intervention   
scheduled/document  
ed in this visit  
   
                                                      
  
  
PT Impaired   
mobility  
  
  
Disciplines:  
  
  
PT                                                    
  
  
2022                                  
  
  
Active                                    
  
  
1 goal linked to   
scheduled/document  
ed intervention                           
  
  
1 goal   
intervention   
scheduled/document  
ed in this visit  
   
                                                      
  
  
PT Impaired gait  
  
  
Disciplines:  
  
  
PT                                                    
  
  
2022                                  
  
  
Active                                    
  
  
1 goal linked to   
scheduled/document  
ed intervention                           
  
  
1 goal   
intervention   
scheduled/document  
ed in this visit  
   
                                                      
  
  
PT Learning   
Assessment  
  
  
Disciplines:  
  
  
PT                                                    
  
  
2022                                  
  
  
Active                                    
  
  
1 goal linked to   
scheduled/document  
ed intervention                           
  
  
1 goal   
intervention   
scheduled/document  
ed in this visit  
   
                                                      
  
  
Medication   
Education  
  
  
Disciplines:  
  
  
Skilled Services                                      
  
  
2022                                  
  
  
Active                                    
  
  
1 goal linked to   
scheduled/document  
ed intervention                           
  
  
1 goal   
intervention   
scheduled/document  
ed in this visit  
   
                                                      
  
  
Sepsis  
  
  
Disciplines:  
  
  
Skilled Services                                      
  
  
2022                                  
  
  
Active                                    
  
  
1 goal linked to   
scheduled/document  
ed intervention                           
  
  
1 goal   
intervention   
scheduled/document  
ed in this visit  
   
                                                      
  
  
Physician   
Specific   
Parameters  
  
  
Disciplines:  
  
  
Skilled Services                                      
  
  
2022                                  
  
  
Active                                    
  
  
1 goal linked to   
scheduled/document  
ed intervention                           
  
  
1 goal   
intervention   
scheduled/document  
ed in this visit  
   
                                                      
  
  
Risk for Falls  
  
  
Disciplines:  
  
  
Skilled Services                                      
  
  
2022                                  
  
  
Active                                    
  
  
1 goal linked to   
scheduled/document  
ed intervention                           
  
  
1 goal   
intervention   
scheduled/document  
ed in this visit  
   
                                                      
  
  
Pain  
  
  
Disciplines:  
  
  
Skilled Services                                      
  
  
2022                                  
  
  
Active                                    
  
  
1 goal linked to   
scheduled/document  
ed intervention                           
  
  
1 goal   
intervention   
scheduled/document  
ed in this visit  
   
                                                      
  
  
Discharge  
  
  
Disciplines:  
  
  
Skilled Services                                      
  
  
2022                                  
  
  
Active                                    
  
  
1 goal linked to   
scheduled/document  
ed intervention                           
  
  
1 goal   
intervention   
scheduled/document  
ed in this visit  
  
  
                                      Goals  
  
                      Goal       Associated Problem Outcome    Goal Met?  Visit   
Notes  
   
                                                      
  
  
Improved Muscle Performance   
and/or ROM  
  
  
Description:  
  
  
LTG: Patient will demonstrate   
improved muscle performance   
to meet functional goals as   
evidenced by ability to   
tolerate 5 sit to stands in   
30 sec and tolerate 10 min of   
a standing activity, to be   
achieved by 10/1/22. .  
  
  
updated 22: to be   
achieved by 10/14/22  
  
  
  
  
  
STG: Patient and/or caregiver   
will verbalize/demonstrate   
independence with home   
exercise program, to improve   
functional mobility, to be   
achieved by 9/10/22.updated   
22: to be achieved by   
10/14/22  
  
  
  
  
  
  
  
  
LTG: Patient will demonstrate   
improved left hip active   
range of motion to 100* in   
standing degrees, to meet   
functional goals, to be   
achieved by 10/1/22.  
  
  
updated 22: to be   
achieved by 10/14/22                      
  
  
PT Impaired muscle   
performance and/or ROM                    
  
                                          
  
  
No                                        
   
                                                      
  
  
Improved Transfers  
  
  
Description:  
  
  
  
  
  
  
  
  
LTG: Patient will demonstrate   
safe transfers to/from bed,   
chair, toilet, shower/tub and   
car independently with AD, to   
be achieved by 10/1/22. .  
  
  
Updated 22: to be   
achieved by 10/14/22  
  
                                          
  
  
PT Impaired mobility                      
  
                                          
  
  
No                                        
   
                                                      
  
  
Improved Gait  
  
  
Description:  
  
  
  
  
  
LTG: Patient will demonstrate   
improved gait ability as   
evidenced by ambulation 150   
feet with rollator walker   
independently with AD, to   
return to safe household and   
community ambulation, in   
order to return to plf , to   
be achieved by 10/1/22. .  
  
  
update 22: ambulate for   
3 minutes with FWW mod indep   
indicating improved endurance   
with adequate foot clearance   
to be met 10/14/22  
  
                                          
  
  
PT Impaired gait                          
  
                                          
  
  
No                                        
   
                                                      
  
  
Demonstrate understanding of   
education  
  
  
Description:  
  
  
Patient and/or caregiver will   
understand educational   
instruction to be achieved by   
10/1/22. .  
  
  
updated 22: to be   
achieved by 10/14/22                      
  
  
PT Learning Assessment                    
  
                                          
  
  
No                                        
   
                                                      
  
  
Patient/caregiver will   
demonstrate ability to   
obtain, store, identify and   
administer ordered   
medications, keep accurate   
medication list in home, and   
adhere to medication schedule  
  
  
Description:  
  
  
Patient/caregiver will   
demonstrate ability to   
obtain, store, identify and   
administer ordered   
medications, keep accurate   
medication list in home, and   
adhere to medication schedule   
by 10/1/22.  
  
  
updated 22: to be   
achieved by 10/14/22                      
  
  
Medication Education                      
  
                                          
  
  
No                                        
   
                                                      
  
  
Patient/caregiver will be   
able to identify and report   
symptoms of sepsis  
  
  
Description:  
  
  
Patient/caregiver will be   
able to identify   
signs/symptoms of sepsis   
infection and will verbalize   
actions to take if suspected   
by 10/1/22.  
  
  
updated 22: to be   
achieved by 10/14/22.                     
  
  
Sepsis                                    
  
                                          
  
  
No                                        
   
                                                      
  
  
Patient to maintain   
parameters within   
physician-specified ranges   
throughout certification   
period                                    
  
  
Physician Specific   
Parameters                                
  
                                          
  
  
No                                        
   
                                                      
  
  
Manage Risk for falls  
  
  
Description:  
  
  
Patient/caregiver will   
verbalize knowledge of   
individualized fall   
prevention strategies by   
10/1/22.  
  
  
updated 22: to be   
achieved by 10/14/22                      
  
  
Risk for Falls                            
  
                                          
  
  
No                                        
   
                                                      
  
  
Manage Pain  
  
  
Description:  
  
  
Patient/caregiver will   
verbalize knowledge and   
understanding of appropriate   
techniques to control pain,   
including pain medication and   
non-pharmacological   
techniques. Patient will   
verbalize or demonstrate an   
acceptable level of pain as   
evidenced by a pain score of   
<5/10 and improvement in   
ability to perform activities   
of daily living to be   
achieved by 10/1/22.  
  
  
updated 22: to be   
achieved by 10/14/22                      
  
  
Pain                                      
  
                                          
  
  
No                                        
   
                                                      
  
  
Manage discharge planning  
  
  
Description:  
  
  
Patient/caregiver will   
verbalize understanding of   
ongoing discharge plan   
provided related to disease   
management, arrangements for   
outpatient and/or community   
services, obtaining   
medications, supplies, and   
DME, as needed throughout   
certification period.                     
  
  
Discharge                                 
  
                                          
  
  
No                                        
  
  
                                  Interventions  
  
                                        Intervention        Associated   
Problem/Goal        Status              Variance            Visit Notes  
   
                                                      
  
  
Physical Therapy   
Therapeutic Exercises                   Problem:PT Impaired   
muscle performance   
and/or ROM  
Goal:Improved Muscle   
Performance and/or   
ROM                                       
  
  
Completed                                 
  
                                          
  
  
patient instructed on   
strengthening   
exercises including   
ist to stands x 8 reps   
with verbal cues for   
full uprght position.   
patient instructed to   
perform home exercise   
program twice a day   
which included   
previous program.  
   
                                                      
  
  
Physical Therapy   
Transfer Training                       Problem:PT Impaired   
mobility  
Goal:Improved   
Transfers                                 
  
  
Completed                                 
  
                                          
  
  
Transfer training and   
instruction to patient   
on safe transfers to   
and from bed and chair   
with supervision and   
verbal and visual cues   
for correct hand   
placement.  
   
                                                      
  
  
Physical Therapy Gait   
Training                                Problem:PT Impaired   
gait  
Goal:Improved Gait                        
  
  
Completed                                 
  
                                          
  
  
Gait training and   
instruction to patient   
on safe ambulation   
with rollator walker   
for 3 mintues 20   
seconds, , with   
decrease in pace and   
increased shuffle   
after 1.5 minutes with   
supervision, with   
verbal cues for   
corrections of gait   
deviations including   
increasing heel strike   
and upright posture.  
   
                                                      
  
  
Instruct and educate   
on knowledge deficits                   Problem:PT Learning   
Assessment  
Goal:Demonstrate   
understanding of   
education                                 
  
  
Completed                                 
  
                                          
  
  
patient verbalize   
and/or demonstrate   
understanding of   
physical therapy   
education including   
pain management, fall   
prevention strategies,   
functional activity   
and home exercise   
program.  
  
  
  
  
  
Education methods   
include: verbal cues.  
  
  
  
  
  
Further education   
required to improve   
knowledge and   
compliance with pain   
management, fall   
prevention strategies,   
functional activity   
and home exercise   
program.  
   
                                                      
  
  
Medication Education  
  
  
Description:  
  
  
Evaluate/instruct   
patient/caregiver on   
obtaining, storing,   
identifying and   
administering ordered   
medications as well   
as keeping accurate   
medication list in   
the home and   
adhereing to   
medication schedule                     Problem:Medication   
Education  
Goal:Patient/caregive  
r will demonstrate   
ability to obtain,   
store, identify and   
administer ordered   
medications, keep   
accurate medication   
list in home, and   
adhere to medication   
schedule                                  
  
  
Completed                                 
  
                                          
  
  
Patient instructed on   
adhering to medication   
schedule.  
   
                                                      
  
  
Risk of Sepsis  
  
  
Description:  
  
  
Patient is at risk   
for sepsis. Monitor   
closely for s/s of   
sepsis.                                 Problem:Sepsis  
Goal:Patient/caregive  
r will be able to   
identify and report   
symptoms of sepsis                        
  
  
Completed                                 
  
                                          
   
                                                      
  
  
SPO2  
  
  
Description:  
  
  
Notify Dr. Ahuja   
if pulse ox is <92%   
at rest.                                Problem:Physician   
Specific Parameters  
Goal:Patient to   
maintain parameters   
within   
physician-specified   
ranges throughout   
certification period                      
  
  
Completed                                 
  
                                          
   
                                                      
  
  
Instruct on   
individual fall risk   
factors and   
strategies to prevent   
falls and injuries   
caused by falls.                        Problem:Risk for   
Falls  
Goal:Manage Risk for   
falls                                     
  
  
Completed                                 
  
                                          
  
  
PT: Patient instructed   
on  
  
  
Eliminating   
Environmental Hazards:   
Keep pathways clear,   
Keep rooms and   
walkways well lit,   
Wear supportive shoes   
or non-skid socks and   
Keep frequently used   
items within reach  
  
  
Managing Impaired   
Functional Mobility:   
Use assistive   
device(s): rollator   
walker  
  
  
Managing Pain  
  
  
  
  
  
   
                                                      
  
  
Instruct on pain and   
instruct on   
strategies to control   
pain                                    Problem:Pain  
Goal:Manage Pain                          
  
  
Completed                                 
  
                                          
  
  
patient instructed on   
techniques to control   
pain including   
Pharmacological   
measures and   
Non-Pharmacological   
measures; rest.  
   
                                                      
  
  
Instruct on ongoing   
discharge plan                          Problem:Discharge  
Goal:Manage discharge   
planning                                  
  
  
Completed                                 
  
                                          
  
  
Ongoing Discharge   
plan: Discharge plan   
discussed with patient   
including frequency   
and duration for home   
PT and plan for   
transition to: live   
independently at home   
without ongoing   
services.  
  
  
  
documented in this encounter  
[facility] Details  
  
                                                    Visit Type -PTA ROUTINE  
Discipline -Physical Therapy  
  
  
                                    Problems  
  
                    Problem   Description Start Date Status    Goals     Interve  
ntions  
   
                                                      
  
  
PT Impaired   
muscle   
performance   
and/or ROM  
  
  
Disciplines:  
  
  
PT                                                    
  
  
2022                                  
  
  
Active                                    
  
  
1 goal linked to   
scheduled/document  
ed intervention                           
  
  
1 goal   
intervention   
scheduled/document  
ed in this visit  
   
                                                      
  
  
PT Impaired   
mobility  
  
  
Disciplines:  
  
  
PT                                                    
  
  
2022                                  
  
  
Active                                    
  
  
1 goal linked to   
scheduled/document  
ed intervention                           
  
  
1 goal   
intervention   
scheduled/document  
ed in this visit  
   
                                                      
  
  
PT Impaired gait  
  
  
Disciplines:  
  
  
PT                                                    
  
  
2022                                  
  
  
Active                                    
  
  
1 goal linked to   
scheduled/document  
ed intervention                           
  
  
1 goal   
intervention   
scheduled/document  
ed in this visit  
   
                                                      
  
  
PT Impaired   
balance  
  
  
Disciplines:  
  
  
PT                                                    
  
  
2022                                  
  
  
Active                                    
  
  
1 goal linked to   
scheduled/document  
ed intervention                           
  
  
1 goal   
intervention   
scheduled/document  
ed in this visit  
   
                                                      
  
  
PT Learning   
Assessment  
  
  
Disciplines:  
  
  
PT                                                    
  
  
2022                                  
  
  
Active                                    
  
  
1 goal linked to   
scheduled/document  
ed intervention                           
  
  
1 goal   
intervention   
scheduled/document  
ed in this visit  
   
                                                      
  
  
Medication   
Education  
  
  
Disciplines:  
  
  
Skilled Services                                      
  
  
2022                                  
  
  
Active                                    
  
  
1 goal linked to   
scheduled/document  
ed intervention                           
  
  
1 goal   
intervention   
scheduled/document  
ed in this visit  
   
                                                      
  
  
Sepsis  
  
  
Disciplines:  
  
  
Skilled Services                                      
  
  
2022                                  
  
  
Active                                    
  
  
1 goal linked to   
scheduled/document  
ed intervention                           
  
  
1 goal   
intervention   
scheduled/document  
ed in this visit  
   
                                                      
  
  
Physician   
Specific   
Parameters  
  
  
Disciplines:  
  
  
Skilled Services                                      
  
  
2022                                  
  
  
Active                                    
  
  
1 goal linked to   
scheduled/document  
ed intervention                           
  
  
1 goal   
intervention   
scheduled/document  
ed in this visit  
   
                                                      
  
  
Risk for Falls  
  
  
Disciplines:  
  
  
Skilled Services                                      
  
  
2022                                  
  
  
Active                                    
  
  
1 goal linked to   
scheduled/document  
ed intervention                           
  
  
1 goal   
intervention   
scheduled/document  
ed in this visit  
   
                                                      
  
  
Pain  
  
  
Disciplines:  
  
  
Skilled Services                                      
  
  
2022                                  
  
  
Active                                    
  
  
1 goal linked to   
scheduled/document  
ed intervention                           
  
  
1 goal   
intervention   
scheduled/document  
ed in this visit  
   
                                                      
  
  
Discharge  
  
  
Disciplines:  
  
  
Skilled Services                                      
  
  
2022                                  
  
  
Active                                    
  
  
1 goal linked to   
scheduled/document  
ed intervention                           
  
  
1 goal   
intervention   
scheduled/document  
ed in this visit  
  
  
                                      Goals  
  
                      Goal       Associated Problem Outcome    Goal Met?  Visit   
Notes  
   
                                                      
  
  
Improved Muscle Performance   
and/or ROM  
  
  
Description:  
  
  
LTG: Patient will demonstrate   
improved muscle performance   
to meet functional goals as   
evidenced by ability to   
tolerate 5 sit to stands in   
30 sec and tolerate 10 min of   
a standing activity, to be   
achieved by 10/1/22. .  
  
  
updated 22: to be   
achieved by 10/14/22  
  
  
  
  
  
STG: Patient and/or caregiver   
will verbalize/demonstrate   
independence with home   
exercise program, to improve   
functional mobility, to be   
achieved by 9/10/22.updated   
22: to be achieved by   
10/14/22  
  
  
  
  
  
  
  
  
LTG: Patient will demonstrate   
improved left hip active   
range of motion to 100* in   
standing degrees, to meet   
functional goals, to be   
achieved by 10/1/22.  
  
  
updated 22: to be   
achieved by 10/14/22                      
  
  
PT Impaired muscle   
performance and/or ROM                    
  
                                          
  
  
No                                        
   
                                                      
  
  
Improved Transfers  
  
  
Description:  
  
  
  
  
  
  
  
  
LTG: Patient will demonstrate   
safe transfers to/from bed,   
chair, toilet, shower/tub and   
car independently with AD, to   
be achieved by 10/1/22. .  
  
  
Updated 22: to be   
achieved by 10/14/22  
  
                                          
  
  
PT Impaired mobility                      
  
                                          
  
  
No                                        
   
                                                      
  
  
Improved Gait  
  
  
Description:  
  
  
  
  
  
LTG: Patient will demonstrate   
improved gait ability as   
evidenced by ambulation 150   
feet with rollator walker   
independently with AD, to   
return to safe household and   
community ambulation, in   
order to return to plf , to   
be achieved by 10/1/22. .  
  
  
update 22: ambulate for   
3 minutes with FWW mod indep   
indicating improved endurance   
with adequate foot clearance   
to be met 10/14/22  
  
                                          
  
  
PT Impaired gait                          
  
                                          
  
  
No                                        
   
                                                      
  
  
Improved Balance  
  
  
Description:  
  
  
  
  
  
LTG: Patient will demonstrate   
improved standing balance to   
meet functional goals as   
evidenced by TUG score of <28   
to be achieved by 10/1/22. .  
  
  
MET 22                               
  
  
PT Impaired balance                       
  
                                          
  
  
No                                        
   
                                                      
  
  
Demonstrate understanding of   
education  
  
  
Description:  
  
  
Patient and/or caregiver will   
understand educational   
instruction to be achieved by   
10/1/22. .  
  
  
updated 22: to be   
achieved by 10/14/22                      
  
  
PT Learning Assessment                    
  
                                          
  
  
No                                        
   
                                                      
  
  
Patient/caregiver will   
demonstrate ability to   
obtain, store, identify and   
administer ordered   
medications, keep accurate   
medication list in home, and   
adhere to medication schedule  
  
  
Description:  
  
  
Patient/caregiver will   
demonstrate ability to   
obtain, store, identify and   
administer ordered   
medications, keep accurate   
medication list in home, and   
adhere to medication schedule   
by 10/1/22.  
  
  
updated 22: to be   
achieved by 10/14/22                      
  
  
Medication Education                      
  
                                          
  
  
No                                        
   
                                                      
  
  
Patient/caregiver will be   
able to identify and report   
symptoms of sepsis  
  
  
Description:  
  
  
Patient/caregiver will be   
able to identify   
signs/symptoms of sepsis   
infection and will verbalize   
actions to take if suspected   
by 10/1/22.  
  
  
updated 22: to be   
achieved by 10/14/22.                     
  
  
Sepsis                                    
  
                                          
  
  
No                                        
   
                                                      
  
  
Patient to maintain   
parameters within   
physician-specified ranges   
throughout certification   
period                                    
  
  
Physician Specific   
Parameters                                
  
                                          
  
  
No                                        
   
                                                      
  
  
Manage Risk for falls  
  
  
Description:  
  
  
Patient/caregiver will   
verbalize knowledge of   
individualized fall   
prevention strategies by   
10/1/22.  
  
  
updated 22: to be   
achieved by 10/14/22                      
  
  
Risk for Falls                            
  
                                          
  
  
No                                        
   
                                                      
  
  
Manage Pain  
  
  
Description:  
  
  
Patient/caregiver will   
verbalize knowledge and   
understanding of appropriate   
techniques to control pain,   
including pain medication and   
non-pharmacological   
techniques. Patient will   
verbalize or demonstrate an   
acceptable level of pain as   
evidenced by a pain score of   
<5/10 and improvement in   
ability to perform activities   
of daily living to be   
achieved by 10/1/22.  
  
  
updated 22: to be   
achieved by 10/14/22                      
  
  
Pain                                      
  
                                          
  
  
No                                        
   
                                                      
  
  
Manage discharge planning  
  
  
Description:  
  
  
Patient/caregiver will   
verbalize understanding of   
ongoing discharge plan   
provided related to disease   
management, arrangements for   
outpatient and/or community   
services, obtaining   
medications, supplies, and   
DME, as needed throughout   
certification period.                     
  
  
Discharge                                 
  
                                          
  
  
No                                        
  
  
                                  Interventions  
  
                                        Intervention        Associated   
Problem/Goal        Status              Variance            Visit Notes  
   
                                                      
  
  
Physical Therapy   
Therapeutic Exercises                   Problem:PT Impaired   
muscle performance   
and/or ROM  
Goal:Improved Muscle   
Performance and/or   
ROM                                       
  
  
Completed                                 
  
                                          
  
  
patient instructed on   
strengthening   
exercises including   
standing hip abd and   
flexion, hamstring   
curls x's 10 each.   
seated: laq, hip   
flexion and adduction   
and heel raises x's   
10 each. with verbal   
cues for correct pace   
. patient instructed   
to perform home   
exercise program   
twice a day which   
included above   
exercises .  
   
                                                      
  
  
Physical Therapy   
Transfer Training                       Problem:PT Impaired   
mobility  
Goal:Improved   
Transfers                                 
  
  
Completed                                 
  
                                          
  
  
Transfer training and   
instruction to   
patient on safe   
transfers to and from   
chair with   
supervision and   
verbal cues for   
reaching back for   
chair  
   
                                                      
  
  
Physical Therapy Gait   
Training                                Problem:PT Impaired   
gait  
Goal:Improved Gait                        
  
  
Completed                                 
  
                                          
  
  
Gait training and   
instruction to   
patient on safe   
ambulation with front   
wheeled walker for   
2x's 80 feet with   
stand by assist, with   
verbal cues for   
corrections of gait   
deviations including   
posture and staying   
closer to walker.  
   
                                                      
  
  
Physical Therapy   
Balance Training                        Problem:PT Impaired   
balance  
Goal:Improved Balance                     
  
  
Completed                                 
  
                                          
  
  
Developed,   
implemented, and   
instructed patient on   
standing balance   
exercises including   
lateral stepping at   
counter 5feet x's 4.  
   
                                                      
  
  
Instruct and educate   
on knowledge deficits                   Problem:PT Learning   
Assessment  
Goal:Demonstrate   
understanding of   
education                                 
  
  
Completed                                 
  
                                          
  
  
patient verbalize   
and/or demonstrate   
understanding of   
physical therapy   
education including   
home exercise   
program.  
  
  
  
  
  
Education methods   
include: verbal cues.  
  
  
  
  
  
Further education   
required to improve   
knowledge and   
compliance with home   
exercise program.  
   
                                                      
  
  
Medication Education  
  
  
Description:  
  
  
Evaluate/instruct   
patient/caregiver on   
obtaining, storing,   
identifying and   
administering ordered   
medications as well as   
keeping accurate   
medication list in the   
home and adhereing to   
medication schedule                     Problem:Medication   
Education  
Goal:Patient/caregive  
r will demonstrate   
ability to obtain,   
store, identify and   
administer ordered   
medications, keep   
accurate medication   
list in home, and   
adhere to medication   
schedule                                  
  
  
Completed                                 
  
                                          
  
  
Patient instructed on   
importance of keeping   
accurate medication   
list in home.  
   
                                                      
  
  
Risk of Sepsis  
  
  
Description:  
  
  
Patient is at risk for   
sepsis. Monitor   
closely for s/s of   
sepsis.                                 Problem:Sepsis  
Goal:Patient/caregive  
r will be able to   
identify and report   
symptoms of sepsis                        
  
  
Completed                                 
  
                                          
   
                                                      
  
  
SPO2  
  
  
Description:  
  
  
Notify Dr. Ahuja   
if pulse ox is <92% at   
rest.                                   Problem:Physician   
Specific Parameters  
Goal:Patient to   
maintain parameters   
within   
physician-specified   
ranges throughout   
certification period                      
  
  
Completed                                 
  
                                          
   
                                                      
  
  
Instruct on individual   
fall risk factors and   
strategies to prevent   
falls and injuries   
caused by falls.                        Problem:Risk for   
Falls  
Goal:Manage Risk for   
falls                                     
  
  
Completed                                 
  
                                          
  
  
PT: Patient   
instructed on   
Managing Impaired   
Functional Mobility:   
Use assistive   
device(s): front   
wheeled walker  
  
  
   
                                                      
  
  
Instruct on pain and   
instruct on strategies   
to control pain                         Problem:Pain  
Goal:Manage Pain                          
  
  
Completed                                 
  
                                          
  
  
patient instructed on   
techniques to control   
pain including   
Non-Pharmacological   
measures;   
positioning/elevation  
.  
   
                                                      
  
  
Instruct on ongoing   
discharge plan                          Problem:Discharge  
Goal:Manage discharge   
planning                                  
  
  
Completed                                 
  
                                          
  
  
Ongoing Discharge   
plan: Discharge plan   
discussed with   
patient including   
frequency and   
duration for home PT   
and plan for   
transition to: live   
independently at home   
without ongoing   
services.  
  
  
  
documented in this encounter  
[facility]'s home Plan of care note* Visit Details  
  
                                                    Visit Type -PTA ROUTINE  
Discipline -Physical Therapy  
  
  
                                    Problems  
  
                    Problem   Description Start Date Status    Goals     Interve  
ntions  
   
                                                      
  
  
PT Impaired   
muscle   
performance   
and/or ROM  
  
  
Disciplines:  
  
  
PT                                                    
  
  
2022                                  
  
  
Active                                    
  
  
1 goal linked to   
scheduled/document  
ed intervention                           
  
  
1 goal   
intervention   
scheduled/document  
ed in this visit  
   
                                                      
  
  
PT Impaired   
mobility  
  
  
Disciplines:  
  
  
PT                                                    
  
  
2022                                  
  
  
Active                                    
  
  
1 goal linked to   
scheduled/document  
ed intervention                           
  
  
1 goal   
intervention   
scheduled/document  
ed in this visit  
   
                                                      
  
  
PT Impaired gait  
  
  
Disciplines:  
  
  
PT                                                    
  
  
2022                                  
  
  
Active                                    
  
  
1 goal linked to   
scheduled/document  
ed intervention                           
  
  
1 goal   
intervention   
scheduled/document  
ed in this visit  
   
                                                      
  
  
PT Impaired   
balance  
  
  
Disciplines:  
  
  
PT                                                    
  
  
2022                                  
  
  
Active                                    
  
  
1 goal linked to   
scheduled/document  
ed intervention                           
  
  
1 goal   
intervention   
scheduled/document  
ed in this visit  
   
                                                      
  
  
PT Learning   
Assessment  
  
  
Disciplines:  
  
  
PT                                                    
  
  
2022                                  
  
  
Active                                    
  
  
1 goal linked to   
scheduled/document  
ed intervention                           
  
  
1 goal   
intervention   
scheduled/document  
ed in this visit  
   
                                                      
  
  
Medication   
Education  
  
  
Disciplines:  
  
  
Skilled Services                                      
  
  
2022                                  
  
  
Active                                    
  
  
1 goal linked to   
scheduled/document  
ed intervention                           
  
  
1 goal   
intervention   
scheduled/document  
ed in this visit  
   
                                                      
  
  
Sepsis  
  
  
Disciplines:  
  
  
Skilled Services                                      
  
  
2022                                  
  
  
Active                                    
  
  
1 goal linked to   
scheduled/document  
ed intervention                           
  
  
1 goal   
intervention   
scheduled/document  
ed in this visit  
   
                                                      
  
  
Physician   
Specific   
Parameters  
  
  
Disciplines:  
  
  
Skilled Services                                      
  
  
2022                                  
  
  
Active                                    
  
  
1 goal linked to   
scheduled/document  
ed intervention                           
  
  
1 goal   
intervention   
scheduled/document  
ed in this visit  
   
                                                      
  
  
Risk for Falls  
  
  
Disciplines:  
  
  
Skilled Services                                      
  
  
2022                                  
  
  
Active                                    
  
  
1 goal linked to   
scheduled/document  
ed intervention                           
  
  
1 goal   
intervention   
scheduled/document  
ed in this visit  
   
                                                      
  
  
Pain  
  
  
Disciplines:  
  
  
Skilled Services                                      
  
  
2022                                  
  
  
Active                                    
  
  
1 goal linked to   
scheduled/document  
ed intervention                           
  
  
1 goal   
intervention   
scheduled/document  
ed in this visit  
   
                                                      
  
  
Discharge  
  
  
Disciplines:  
  
  
Skilled Services                                      
  
  
2022                                  
  
  
Active                                    
  
  
1 goal linked to   
scheduled/document  
ed intervention                           
  
  
2 goal   
interventions   
scheduled/document  
ed in this visit  
  
  
                                      Goals  
  
                      Goal       Associated Problem Outcome    Goal Met?  Visit   
Notes  
   
                                                      
  
  
Improved Muscle Performance   
and/or ROM  
  
  
Description:  
  
  
LTG: Patient will demonstrate   
improved muscle performance   
to meet functional goals as   
evidenced by ability to   
tolerate 5 sit to stands in   
30 sec and tolerate 10 min of   
a standing activity, to be   
achieved by 10/1/22. .  
  
  
updated 22: to be   
achieved by 10/14/22  
  
  
  
  
  
STG: Patient and/or caregiver   
will verbalize/demonstrate   
independence with home   
exercise program, to improve   
functional mobility, to be   
achieved by 9/10/22.updated   
22: to be achieved by   
10/14/22  
  
  
  
  
  
  
  
  
LTG: Patient will demonstrate   
improved left hip active   
range of motion to 100* in   
standing degrees, to meet   
functional goals, to be   
achieved by 10/1/22.  
  
  
updated 22: to be   
achieved by 10/14/22                      
  
  
PT Impaired muscle   
performance and/or ROM                    
  
                                          
  
  
No                                        
   
                                                      
  
  
Improved Transfers  
  
  
Description:  
  
  
  
  
  
  
  
  
LTG: Patient will demonstrate   
safe transfers to/from bed,   
chair, toilet, shower/tub and   
car independently with AD, to   
be achieved by 10/1/22. .  
  
  
Updated 22: to be   
achieved by 10/14/22  
  
                                          
  
  
PT Impaired mobility                      
  
                                          
  
  
No                                        
   
                                                      
  
  
Improved Gait  
  
  
Description:  
  
  
  
  
  
LTG: Patient will demonstrate   
improved gait ability as   
evidenced by ambulation 150   
feet with rollator walker   
independently with AD, to   
return to safe household and   
community ambulation, in   
order to return to plf , to   
be achieved by 10/1/22. .  
  
  
update 22: ambulate for   
3 minutes with FWW mod indep   
indicating improved endurance   
with adequate foot clearance   
to be met 10/14/22  
  
                                          
  
  
PT Impaired gait                          
  
                                          
  
  
No                                        
   
                                                      
  
  
Improved Balance  
  
  
Description:  
  
  
  
  
  
LTG: Patient will demonstrate   
improved standing balance to   
meet functional goals as   
evidenced by TUG score of <28   
to be achieved by 10/1/22. .  
  
  
MET 22                               
  
  
PT Impaired balance                       
  
                                          
  
  
No                                        
   
                                                      
  
  
Demonstrate understanding of   
education  
  
  
Description:  
  
  
Patient and/or caregiver will   
understand educational   
instruction to be achieved by   
10/1/22. .  
  
  
updated 22: to be   
achieved by 10/14/22                      
  
  
PT Learning Assessment                    
  
                                          
  
  
No                                        
   
                                                      
  
  
Patient/caregiver will   
demonstrate ability to   
obtain, store, identify and   
administer ordered   
medications, keep accurate   
medication list in home, and   
adhere to medication schedule  
  
  
Description:  
  
  
Patient/caregiver will   
demonstrate ability to   
obtain, store, identify and   
administer ordered   
medications, keep accurate   
medication list in home, and   
adhere to medication schedule   
by 10/1/22.  
  
  
updated 22: to be   
achieved by 10/14/22                      
  
  
Medication Education                      
  
                                          
  
  
No                                        
   
                                                      
  
  
Patient/caregiver will be   
able to identify and report   
symptoms of sepsis  
  
  
Description:  
  
  
Patient/caregiver will be   
able to identify   
signs/symptoms of sepsis   
infection and will verbalize   
actions to take if suspected   
by 10/1/22.  
  
  
updated 22: to be   
achieved by 10/14/22.                     
  
  
Sepsis                                    
  
                                          
  
  
No                                        
   
                                                      
  
  
Patient to maintain   
parameters within   
physician-specified ranges   
throughout certification   
period                                    
  
  
Physician Specific   
Parameters                                
  
                                          
  
  
No                                        
   
                                                      
  
  
Manage Risk for falls  
  
  
Description:  
  
  
Patient/caregiver will   
verbalize knowledge of   
individualized fall   
prevention strategies by   
10/1/22.  
  
  
updated 22: to be   
achieved by 10/14/22                      
  
  
Risk for Falls                            
  
                                          
  
  
No                                        
   
                                                      
  
  
Manage Pain  
  
  
Description:  
  
  
Patient/caregiver will   
verbalize knowledge and   
understanding of appropriate   
techniques to control pain,   
including pain medication and   
non-pharmacological   
techniques. Patient will   
verbalize or demonstrate an   
acceptable level of pain as   
evidenced by a pain score of   
<5/10 and improvement in   
ability to perform activities   
of daily living to be   
achieved by 10/1/22.  
  
  
updated 22: to be   
achieved by 10/14/22                      
  
  
Pain                                      
  
                                          
  
  
No                                        
   
                                                      
  
  
Manage discharge planning  
  
  
Description:  
  
  
Patient/caregiver will   
verbalize understanding of   
ongoing discharge plan   
provided related to disease   
management, arrangements for   
outpatient and/or community   
services, obtaining   
medications, supplies, and   
DME, as needed throughout   
certification period.                     
  
  
Discharge                                 
  
                                          
  
  
No                                        
  
  
                                  Interventions  
  
                                        Intervention        Associated   
Problem/Goal        Status              Variance            Visit Notes  
   
                                                      
  
  
Physical Therapy   
Therapeutic Exercises                   Problem:PT Impaired   
muscle performance   
and/or ROM  
Goal:Improved Muscle   
Performance and/or   
ROM                                       
  
  
Completed                                 
  
                                          
  
  
patient instructed on   
strengthening   
exercises including   
seated: laq, hip   
flexion and adduction   
x's 15each. standing    
hip f;lexion and   
abduction, hamstring   
curls x's 10 each with   
verbal cues for   
posture and pacing .   
patient instructed to   
perform home exercise   
program twice a day   
which included above   
exercises .  
   
                                                      
  
  
Physical Therapy   
Transfer Training                       Problem:PT Impaired   
mobility  
Goal:Improved   
Transfers                                 
  
  
Completed                                 
  
                                          
  
  
Transfer training and   
instruction to patient   
on safe transfers to   
and from chair with   
supervision and verbal   
cues for reaching back   
for chair .  
   
                                                      
  
  
Physical Therapy Gait   
Training                                Problem:PT Impaired   
gait  
Goal:Improved Gait                        
  
  
Completed                                 
  
                                          
  
  
Gait training and   
instruction to patient   
on safe ambulation   
with rollator walker   
for 2x's 80 feet with   
supervision, with   
verbal cues for   
corrections of gait   
deviations including   
staying closer to   
walker for upright   
posture and safety.  
   
                                                      
  
  
Physical Therapy   
Balance Training                        Problem:PT Impaired   
balance  
Goal:Improved Balance                     
  
  
Completed                                 
  
                                          
  
  
Developed,   
implemented, and   
instructed patient on   
standing balance   
exercises including   
standing exercises .  
   
                                                      
  
  
Instruct and educate   
on knowledge deficits                   Problem:PT Learning   
Assessment  
Goal:Demonstrate   
understanding of   
education                                 
  
  
Completed                                 
  
                                          
  
  
patient verbalize   
and/or demonstrate   
understanding of   
physical therapy   
education including   
home exercise program.  
  
  
  
  
  
Education methods   
include: verbal cues   
and visual cues.  
  
  
  
  
  
Further education   
required to improve   
knowledge and   
compliance with home   
exercise program.  
   
                                                      
  
  
Medication Education  
  
  
Description:  
  
  
Evaluate/instruct   
patient/caregiver on   
obtaining, storing,   
identifying and   
administering ordered   
medications as well   
as keeping accurate   
medication list in   
the home and   
adhereing to   
medication schedule                     Problem:Medication   
Education  
Goal:Patient/caregive  
r will demonstrate   
ability to obtain,   
store, identify and   
administer ordered   
medications, keep   
accurate medication   
list in home, and   
adhere to medication   
schedule                                  
  
  
Completed                                 
  
                                          
  
  
Patient instructed on   
importance of keeping   
accurate medication   
list in home and   
adhering to medication   
schedule.  
   
                                                      
  
  
Risk of Sepsis  
  
  
Description:  
  
  
Patient is at risk   
for sepsis. Monitor   
closely for s/s of   
sepsis.                                 Problem:Sepsis  
Goal:Patient/caregive  
r will be able to   
identify and report   
symptoms of sepsis                        
  
  
Completed                                 
  
                                          
   
                                                      
  
  
SPO2  
  
  
Description:  
  
  
Notify Dr. Ahuja   
if pulse ox is <92%   
at rest.                                Problem:Physician   
Specific Parameters  
Goal:Patient to   
maintain parameters   
within   
physician-specified   
ranges throughout   
certification period                      
  
  
Completed                                 
  
                                          
   
                                                      
  
  
Instruct on   
individual fall risk   
factors and   
strategies to prevent   
falls and injuries   
caused by falls.                        Problem:Risk for   
Falls  
Goal:Manage Risk for   
falls                                     
  
  
Completed                                 
  
                                          
  
  
PT: Patient instructed   
on Managing Impaired   
Functional Mobility:   
Use assistive   
device(s): front   
wheeled walker and   
rollator walker  
  
  
   
                                                      
  
  
Instruct on pain and   
instruct on   
strategies to control   
pain                                    Problem:Pain  
Goal:Manage Pain                          
  
  
Completed                                 
  
                                          
  
  
patient instructed on   
techniques to control   
pain including   
Pharmacological   
measures and   
Non-Pharmacological   
measures; rest.  
   
                                                      
  
  
Deliver [facility]                           Problem:Discharge  
Goal:Manage discharge   
planning                                  
  
  
Completed                                 
  
                                          
  
  
Delivered [facility] on   
10/10/22 for discharge   
date of 10./12/22.   
Patient denies   
questions/concerns   
w/form  
   
                                                      
  
  
Instruct on ongoing   
discharge plan                          Problem:Discharge  
Goal:Manage discharge   
planning                                  
  
  
Completed                                 
  
                                          
  
  
Ongoing Discharge   
plan: Discharge plan   
discussed with patient   
including frequency   
and duration for home   
PT and plan for   
transition to:   
caregiver assistance.  
  
  
  
documented in this encounter  
[facility]  
  
                                                    Visit Type -PT [facility]  
Discipline -Physical Therapy  
  
  
                                    Problems  
  
                    Problem   Description Start Date Status    Goals     Interve  
ntions  
   
                                                      
  
  
PT Impaired   
muscle   
performance   
and/or ROM  
  
  
Disciplines:  
  
  
PT                                                    
  
  
2022                                  
  
  
Resolved on   
10/12/2022                                
  
  
1 goal linked to   
scheduled/documen  
wu intervention                          
  
  
1 goal   
intervention   
scheduled/document  
ed in this visit  
   
                                                      
  
  
PT Impaired   
mobility  
  
  
Disciplines:  
  
  
PT                                                    
  
  
2022                                  
  
  
Resolved on   
10/12/2022                                
  
  
1 goal linked to   
scheduled/documen  
wu intervention                          
  
  
1 goal   
intervention   
scheduled/document  
ed in this visit  
   
                                                      
  
  
PT Impaired gait  
  
  
Disciplines:  
  
  
PT                                                    
  
  
2022                                  
  
  
Resolved on   
10/12/2022                                
  
  
1 goal linked to   
scheduled/documen  
wu intervention                          
  
  
1 goal   
intervention   
scheduled/document  
ed in this visit  
   
                                                      
  
  
PT Impaired   
balance  
  
  
Disciplines:  
  
  
PT                                                    
  
  
2022                                  
  
  
Resolved on   
10/12/2022                                
  
  
1 goal linked to   
scheduled/documen  
wu intervention                          
  
  
1 goal   
intervention   
scheduled/document  
ed in this visit  
   
                                                      
  
  
PT Learning   
Assessment  
  
  
Disciplines:  
  
  
PT                                                    
  
  
2022                                  
  
  
Resolved on   
10/12/2022                                
  
  
1 goal linked to   
scheduled/documen  
wu intervention                          
  
  
1 goal   
intervention   
scheduled/document  
ed in this visit  
   
                                                      
  
  
Medication   
Education  
  
  
Disciplines:  
  
  
Skilled Services                                      
  
  
2022                                  
  
  
Resolved on   
10/12/2022                                
  
  
1 goal linked to   
scheduled/documen  
wu intervention                          
  
  
1 goal   
intervention   
scheduled/document  
ed in this visit  
   
                                                      
  
  
Sepsis  
  
  
Disciplines:  
  
  
Skilled Services                                      
  
  
2022                                  
  
  
Resolved on   
10/12/2022                                
  
  
1 goal linked to   
scheduled/documen  
wu intervention                          
  
  
1 goal   
intervention   
scheduled/document  
ed in this visit  
   
                                                      
  
  
Physician   
Specific   
Parameters  
  
  
Disciplines:  
  
  
Skilled Services                                      
  
  
2022                                  
  
  
Resolved on   
10/12/2022                                
  
  
1 goal linked to   
scheduled/documen  
wu intervention                          
  
  
1 goal   
intervention   
scheduled/document  
ed in this visit  
   
                                                      
  
  
Risk for Falls  
  
  
Disciplines:  
  
  
Skilled Services                                      
  
  
2022                                  
  
  
Resolved on   
10/12/2022                                
  
  
1 goal linked to   
scheduled/documen  
wu intervention                          
  
  
1 goal   
intervention   
scheduled/document  
ed in this visit  
   
                                                      
  
  
Pain  
  
  
Disciplines:  
  
  
Skilled Services                                      
  
  
2022                                  
  
  
Resolved on   
10/12/2022                                
  
  
1 goal linked to   
scheduled/documen  
wu intervention                          
  
  
1 goal   
intervention   
scheduled/document  
ed in this visit  
   
                                                      
  
  
Discharge  
  
  
Disciplines:  
  
  
Skilled Services                                      
  
  
2022                                  
  
  
Resolved on   
10/12/2022                                
  
  
1 goal linked to   
scheduled/documen  
wu intervention                          
  
  
1 goal   
intervention   
scheduled/document  
ed in this visit  
  
  
                                      Goals  
  
                      Goal       Associated Problem Outcome    Goal Met?  Visit   
Notes  
   
                                                      
  
  
Improved Muscle Performance   
and/or ROM  
  
  
Description:  
  
  
LTG: Patient will   
demonstrate improved muscle   
performance to meet   
functional goals as   
evidenced by ability to   
tolerate 5 sit to stands in   
30 sec and tolerate 10 min   
of a standing activity, to   
be achieved by 10/1/22. .  
  
  
updated 22: to be   
achieved by 10/14/22  
  
  
  
  
  
STG: Patient and/or   
caregiver will   
verbalize/demonstrate   
independence with home   
exercise program, to   
improve functional   
mobility, to be achieved by   
9/10/22.updated 22: to   
be achieved by 10/14/22  
  
  
  
  
  
  
  
  
LTG: Patient will   
demonstrate improved left   
hip active range of motion   
to 100* in standing   
degrees, to meet functional   
goals, to be achieved by   
10/1/22.  
  
  
updated 22: to be   
achieved by 10/14/22                      
  
  
PT Impaired muscle   
performance and/or ROM                    
  
  
Completed                                 
  
  
Yes                                       
   
                                                      
  
  
Improved Transfers  
  
  
Description:  
  
  
  
  
  
  
  
  
LTG: Patient will   
demonstrate safe transfers   
to/from bed, chair, toilet,   
shower/tub and car   
independently with AD, to   
be achieved by 10/1/22. .  
  
  
Updated 22: to be   
achieved by 10/14/22  
  
                                          
  
  
PT Impaired mobility                      
  
  
Completed                                 
  
  
Yes                                       
   
                                                      
  
  
Improved Gait  
  
  
Description:  
  
  
  
  
  
LTG: Patient will   
demonstrate improved gait   
ability as evidenced by   
ambulation 150 feet with   
rollator walker   
independently with AD, to   
return to safe household   
and community ambulation,   
in order to return to plf ,   
to be achieved by 10/1/22.   
.  
  
  
update 22: ambulate   
for 3 minutes with FWW mod   
indep indicating improved   
endurance with adequate   
foot clearance to be met   
10/14/22  
  
                                          
  
  
PT Impaired gait                          
  
  
Completed                                 
  
  
Yes                                       
   
                                                      
  
  
Improved Balance  
  
  
Description:  
  
  
  
  
  
LTG: Patient will   
demonstrate improved   
standing balance to meet   
functional goals as   
evidenced by TUG score of   
<28 to be achieved by   
10/1/22. .  
  
  
MET 22                               
  
  
PT Impaired balance                       
  
  
Completed                                 
  
  
Yes                                       
   
                                                      
  
  
Demonstrate understanding   
of education  
  
  
Description:  
  
  
Patient and/or caregiver   
will understand educational   
instruction to be achieved   
by 10/1/22. .  
  
  
updated 22: to be   
achieved by 10/14/22                      
  
  
PT Learning Assessment                    
  
  
Completed                                 
  
  
Yes                                       
   
                                                      
  
  
Patient/caregiver will   
demonstrate ability to   
obtain, store, identify and   
administer ordered   
medications, keep accurate   
medication list in home,   
and adhere to medication   
schedule  
  
  
Description:  
  
  
Patient/caregiver will   
demonstrate ability to   
obtain, store, identify and   
administer ordered   
medications, keep accurate   
medication list in home,   
and adhere to medication   
schedule by 10/1/22.  
  
  
updated 22: to be   
achieved by 10/14/22                      
  
  
Medication Education                      
  
  
Completed                                 
  
  
Yes                                       
   
                                                      
  
  
Patient/caregiver will be   
able to identify and report   
symptoms of sepsis  
  
  
Description:  
  
  
Patient/caregiver will be   
able to identify   
signs/symptoms of sepsis   
infection and will   
verbalize actions to take   
if suspected by 10/1/22.  
  
  
updated 22: to be   
achieved by 10/14/22.                     
  
  
Sepsis                                    
  
  
Completed                                 
  
  
Yes                                       
   
                                                      
  
  
Patient to maintain   
parameters within   
physician-specified ranges   
throughout certification   
period                                    
  
  
Physician Specific   
Parameters                                
  
  
Completed                                 
  
  
Yes                                       
   
                                                      
  
  
Manage Risk for falls  
  
  
Description:  
  
  
Patient/caregiver will   
verbalize knowledge of   
individualized fall   
prevention strategies by   
10/1/22.  
  
  
updated 22: to be   
achieved by 10/14/22                      
  
  
Risk for Falls                            
  
  
Completed                                 
  
  
Yes                                       
   
                                                      
  
  
Manage Pain  
  
  
Description:  
  
  
Patient/caregiver will   
verbalize knowledge and   
understanding of   
appropriate techniques to   
control pain, including   
pain medication and   
non-pharmacological   
techniques. Patient will   
verbalize or demonstrate an   
acceptable level of pain as   
evidenced by a pain score   
of <5/10 and improvement in   
ability to perform   
activities of daily living   
to be achieved by 10/1/22.  
  
  
updated 22: to be   
achieved by 10/14/22                      
  
  
Pain                                      
  
  
Completed                                 
  
  
Yes                                       
   
                                                      
  
  
Manage discharge planning  
  
  
Description:  
  
  
Patient/caregiver will   
verbalize understanding of   
ongoing discharge plan   
provided related to disease   
management, arrangements   
for outpatient and/or   
community services,   
obtaining medications,   
supplies, and DME, as   
needed throughout   
certification period.                     
  
  
Discharge                                 
  
  
Completed                                 
  
  
Yes                                       
  
  
                                  Interventions  
  
                                        Intervention        Associated   
Problem/Goal        Status              Variance            Visit Notes  
   
                                                      
  
  
Physical Therapy   
Therapeutic Exercises                   Problem:PT Impaired   
muscle performance   
and/or ROM  
Goal:Improved Muscle   
Performance and/or   
ROM                                       
  
  
Completed                                 
  
                                          
  
  
patient instructed on   
strengthening   
exercises including   
seated: laq, hip   
flexion and adduction   
x's 15each. standing    
hip f;lexion and   
abduction, hamstring   
curls x's 10 each with   
verbal cues for   
posture and pacing .   
patient instructed to   
perform home exercise   
program twice a day   
which included above   
exercises  
  
  
   
                                                      
  
  
Physical Therapy   
Transfer Training                       Problem:PT Impaired   
mobility  
Goal:Improved   
Transfers                                 
  
  
Completed                                 
  
                                          
  
  
Transfers are DARIUS   
with safe/proper   
technique  
   
                                                      
  
  
Physical Therapy Gait   
Training                                Problem:PT Impaired   
gait  
Goal:Improved Gait                        
  
  
Completed                                 
  
                                          
  
  
Indep amb with WW with   
steady recip pattern,   
pt must make an effort   
to  his toes ,   
has no toe off  
   
                                                      
  
  
Physical Therapy   
Balance Training                        Problem:PT Impaired   
balance  
Goal:Improved Balance                     
  
  
Completed                                 
  
                                          
  
  
pt demonstrates   
improved dynamic   
standing balance as   
evidenced by a tug of   
28  
   
                                                      
  
  
Instruct and educate   
on knowledge deficits                   Problem:PT Learning   
Assessment  
Goal:Demonstrate   
understanding of   
education                                 
  
  
Completed                                 
  
                                          
  
  
patient verbalize   
and/or demonstrate   
understanding of   
physical therapy   
education including   
pain management, fall   
prevention strategies,   
home safety,   
functional activity   
and home exercise   
program.  
  
  
  
  
  
Education methods   
include: verbal cues.  
  
  
  
  
  
   
                                                      
  
  
Medication Education  
  
  
Description:  
  
  
Evaluate/instruct   
patient/caregiver on   
obtaining, storing,   
identifying and   
administering ordered   
medications as well   
as keeping accurate   
medication list in   
the home and   
adhereing to   
medication schedule                     Problem:Medication   
Education  
Goal:Patient/caregive  
r will demonstrate   
ability to obtain,   
store, identify and   
administer ordered   
medications, keep   
accurate medication   
list in home, and   
adhere to medication   
schedule                                  
  
  
Completed                                 
  
                                          
  
  
Patient instructed on   
importance of keeping   
accurate medication   
list in home and   
adhering to medication   
schedule.  
   
                                                      
  
  
Risk of Sepsis  
  
  
Description:  
  
  
Patient is at risk   
for sepsis. Monitor   
closely for s/s of   
sepsis.                                 Problem:Sepsis  
Goal:Patient/caregive  
r will be able to   
identify and report   
symptoms of sepsis                        
  
  
Completed                                 
  
                                          
   
                                                      
  
  
SPO2  
  
  
Description:  
  
  
Notify Dr. Ahuja   
if pulse ox is <92%   
at rest.                                Problem:Physician   
Specific Parameters  
Goal:Patient to   
maintain parameters   
within   
physician-specified   
ranges throughout   
certification period                      
  
  
Completed                                 
  
                                          
   
                                                      
  
  
Instruct on   
individual fall risk   
factors and   
strategies to prevent   
falls and injuries   
caused by falls.                        Problem:Risk for   
Falls  
Goal:Manage Risk for   
falls                                     
  
  
Completed                                 
  
                                          
  
  
PT: Patient instructed   
on  
  
  
Eliminating   
Environmental Hazards:   
Keep pathways clear,   
Keep pets out of   
pathways and Remove   
unsafe rugs  
  
  
Managing Impaired   
Functional Mobility:   
Use assistive   
device(s): front   
wheeled walker  
  
  
Managing Pain  
  
  
  
  
  
   
                                                      
  
  
Instruct on pain and   
instruct on   
strategies to control   
pain                                    Problem:Pain  
Goal:Manage Pain                          
  
  
Completed                                 
  
                                          
  
  
patient instructed on   
techniques to control   
pain including   
Pharmacological   
measures and   
Non-Pharmacological   
measures; rest and   
positioning/elevation.  
   
                                                      
  
  
Instruct on final   
discharge plan and   
deliver discharge   
instructions                            Problem:Discharge  
Goal:Manage discharge   
planning                                  
  
  
Completed                                 
  
                                          
  
  
Delivered Discharge   
plan: Discharge plan   
discussed with patient  
  
  
for plan for   
transition to: live at   
home with community   
assistance  
  
  
  
documented in this encounter  
[facility] Diagnostic Procedure Only 
  (Routine) - Closed  
  
                          Specialty    Diagnoses / Procedures Referred By Contac  
t Referred To Contact  
   
                                        [facility]            
  
  
Diagnoses  
  
  
Monoclonal gammopathy  
  
  
  
Procedures  
  
  
XR BONE SURVEY ROUTINE  
  
  
RADIOLOGIC EXAMINATION   
OSSEOUS SURVEY COMPL                      
  
  
Tello Ortega DO  
  
  
[address] [place] [address]  
  
  
Phone: [phone]  
  
  
Fax: [phone]                         
  
  
[facility]  
  
  
  
                    Referral ID Status    Reason    Start Date Expiration Date V  
isits   
Requested                               Visits   
Authorized  
   
                                [id number]        Closed            
  
  
Auto-Generate  
d Referral      2022        1               1  
  
  
  
  
Electronically signed by Tello Ortega DO at 2022 3:40 PM EST  
  
  
* Diagnostic Procedure Only (Routine) - Authorized  
  
                          Specialty    Diagnoses / Procedures Referred By Contac  
t Referred To Contact  
   
                                        [facility]            
  
  
Diagnoses  
  
  
Monoclonal gammopathy  
  
  
Macrocytic anemia  
  
  
Thrombocytopenia (HCC)  
  
  
  
Procedures  
  
  
US ABD RT UPPER QUADRANT  
  
  
US ABDOMINAL REAL TIME   
W/IMAGE LIMITED                           
  
  
Tello Ortega, DO  
  
  
[address] [place] [address]  
  
  
Phone: [phone]  
  
  
Fax: [phone]                         
  
  
[facility]  
  
  
  
                          Referral ID  Status       Reason       Start   
Date                                    Expiration   
Date                                    Visits   
Requested                               Visits   
Authorized  
   
                                [id number]        Authorized        
  
  
Auto-Generat  
ed Referral     2022        1               1  
  
  
  
  
Electronically signed by Tello Ortega DO at 2022 3:29 PM EST  
  
  
[facility] for referral (narrative)* Outpatient Procedure (Routine) 
  - Authorized  
  
                          Specialty    Diagnoses / Procedures Referred By Contac  
t Referred To Contact  
   
                                                    [facility] DISEASE   
[place]                                 
  
  
Diagnoses  
  
  
[place]  
  
  
  
Procedures  
  
  
EGD - THERAPEUTIC, EUS,   
OR TUBE INTERVENTIONS  
  
  
EGD BAND LIGATION   
ESOPHGEAL/GASTRIC   
VARICES                                   
  
  
Juanito Esparza MD  
  
  
[address] [place] [address]  
  
  
Phone: [phone]  
  
  
Fax: [phone]                         
  
  
[place] [address]  
  
  
  
                          Referral ID  Status       Reason       Start   
Date                                    Expiration   
Date                                    Visits   
Requested                               Visits   
Authorized  
   
                                [id number]        Authorized        
  
  
Auto-Generat  
ed Referral                             10/19/202  
3                   10/19/2024          1                   1  
  
  
  
  
Electronically signed by Juanito Esparza MD at 10/19/2023 2:42 PM EDT  
  
  
[facility] for referral (narrative)* Diagnostic Procedure Only 
  (Routine) - Closed  
  
                          Specialty    Diagnoses / Procedures Referred By Contac  
t Referred To Contact  
   
                                        [facility]            
  
  
Diagnoses  
  
  
Monoclonal gammopathy  
  
  
Macrocytic anemia  
  
  
Thrombocytopenia (HCC)  
  
  
  
Procedures  
  
  
US ABD RT UPPER QUADRANT  
  
  
US ABDOMINAL REAL TIME   
W/IMAGE LIMITED                           
  
  
Tello Ortega DO  
  
  
[address] [place] [address]  
  
  
Phone: [phone]  
  
  
Fax: [phone]                         
  
  
[facility]  
  
  
[place] [address]  
  
  
  
                    Referral ID Status    Reason    Start Date Expiration Date V  
isits   
Requested                               Visits   
Authorized  
   
                                [id number]        Closed            
  
  
Auto-Generate  
d Referral      2022        1               1  
  
  
  
  
Electronically signed by Tello Ortega DO at 2022 8:45 AM EST  
  
  
[facility]  
  
Summary Purpose  
  
  
                                                      
  
  
  
Family History  
No Family History Records FoundNo Family History Records FoundNo Family History 
Records FoundNo Family History Records FoundNo Family History Records FoundNo 
Family History Records Found  
  
Advance Directives  
No Advanced Directives Records FoundDocuments on File  
  
                          Type         Date Recorded Patient Representative Expl  
anation  
   
                          Advance Directive(s) 2020 9:27 AM                
   
                          Advance Directive(s) 2020 5:47 PM                
   
                          Advance Directive(s) 2018 11:22 AM                
  
                                Documents on File  
  
                          Type         Date Recorded Patient Representative Expl  
anation  
   
                          Advance Directive(s) 2020 9:27 AM                
   
                          Advance Directive(s) 2020 5:47 PM                
   
                          Advance Directive(s) 2018 11:22 AM                
  
                           Latest Code Status on File  
  
                          Code Status  Date Activated Date Inactivated Comments  
   
                          Full Code    2022 7:21 PM                
  
                           Latest Code Status on File  
  
                          Code Status  Date Activated Date Inactivated Comments  
   
                          Full Code    2022 7:21 PM                
  
                           Latest Code Status on File  
  
                          Code Status  Date Activated Date Inactivated Comments  
   
                          Full Code    2022 7:21 PM 2023 2:18 AM   
  
                           Latest Code Status on File  
  
                          Code Status  Date Activated Date Inactivated Comments  
   
                          Full Code    2022 7:21 PM 2023 2:18 AM   
  
                           Latest Code Status on File  
  
                          Code Status  Date Activated Date Inactivated Comments  
   
                          Full Code    2022 7:21 PM 2023 2:18 AM   
  
                           Latest Code Status on File  
  
                          Code Status  Date Activated Date Inactivated Comments  
   
                          Full Code    2022 7:21 PM 2023 2:18 AM   
  
  
  
Reason for Referral  
  
  
                          Specialty    Diagnoses / Procedures Referred By Contac  
t Referred To Contact  
   
                                        Gastroenterology      
  
  
Diagnoses  
  
  
Adenomatous polyp of colon,   
unspecified part of colon  
  
  
  
Procedures  
  
  
CONSULT TO GASTROENTEROLOGY               
  
  
Júnior Ahuja MD  
  
  
[address] [place] [address]  
  
  
Phone: [phone]  
  
  
Fax: [phone]                         
  
  
  
  
  
                          Referral ID  Status       Reason       Start   
Date                                    Expiration   
Date                                    Visits   
Requested                               Visits   
Authorized  
   
                                        [id number]            Ref Not   
Required                                  
  
  
PCP Requested   
Referral        2022       1               1  
  
  
  
                          Specialty    Diagnoses / Procedures Referred By Contac  
t Referred To Contact  
   
                                                              
  
  
Diagnoses  
  
  
Peripheral polyneuropathy  
  
  
Frequent falls  
  
  
Contusion of hip,   
unspecified laterality,   
subsequent encounter  
  
  
Muscular weakness  
  
  
Abnormality of gait  
  
  
  
Procedures  
  
  
CONSULT TO [facility]                            
  
  
Júnior Ahuja MD  
  
  
[address] [place] [address]  
  
  
Phone: [phone]  
  
  
Fax: [phone]                         
  
  
Home Care  
  
  
[address] [place] [address]  
  
  
Phone: [phone]  
  
  
  
                          Referral ID  Status       Reason       Start   
Date                                    Expiration   
Date                                    Visits   
Requested                               Visits   
Authorized  
   
                                [id number]        Authorized        
  
  
PCP Requested   
Referral        2022      1               1  
  
  
  
                          Specialty    Diagnoses / Procedures Referred By Contac  
t Referred To Contact  
   
                                                              
  
  
Diagnoses  
  
  
Frequent falls  
  
  
Peripheral polyneuropathy  
  
  
  
Procedures  
  
  
CONSULT TO NEUROLOGY                      
  
  
Júnior Ahuja MD  
  
  
[address] [place] [address]  
  
  
Phone: [phone]  
  
  
Fax: [phone]                         
  
  
  
  
  
                          Referral ID  Status       Reason       Start   
Date                                    Expiration   
Date                                    Visits   
Requested                               Visits   
Authorized  
   
                                        [id number]            Ref Not   
Required                                  
  
  
PCP Requested   
Referral        10/5/2022       10/3/2023       1               1  
  
  
  
                          Specialty    Diagnoses / Procedures Referred By Contac  
t Referred To Contact  
   
                                                    [facility]                               
  
  
Diagnoses  
  
  
Peripheral   
polyneuropathy  
  
  
Abnormality of gait  
  
  
  
Procedures  
  
  
CONSULT TO OCCUPATIONAL   
THER  
  
  
OCCUPATIONAL THERAPY   
EVAL HIGH COMPLEX 60   
MINS                                      
  
  
Júnior Ahuja MD  
  
  
[address] [place] [address]  
  
  
Phone: [phone]  
  
  
Fax: [phone]                         
  
  
[facility]   
[facility] [address] [place] [address]  
  
  
  
                          Referral ID  Status       Reason       Start   
Date                                    Expiration   
Date                                    Visits   
Requested                               Visits   
Authorized  
   
                                [id number]        Authorized        
  
  
PCP Requested   
Referral  
  
  
Auto-Generate  
d Referral                              10/17/202  
2                   10/17/2023          99                  99  
  
  
  
                          Specialty    Diagnoses / Procedures Referred By Contac  
t Referred To Contact  
   
                                        MR IMAGING            
  
  
Diagnoses  
  
  
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   
ascites, unspecified hepatic   
cirrhosis type (HCC)  
  
  
Liver mass  
  
  
Splenomegaly  
  
  
  
Procedures  
  
  
MRI LIVER WO/W IVCON  
  
  
MRI ABDOMEN W/O & W/CONTRAST   
MATERIAL                                  
  
  
Maurizio Cochran PA-C  
  
  
[address] [place] [address]  
  
  
Phone: [phone]  
  
  
Fax: [phone]                         
  
  
[facility]  
  
  
  
                          Referral ID  Status       Reason       Start   
Date                                    Expiration   
Date                                    Visits   
Requested                               Visits   
Authorized  
   
                                        [id number]            Pending   
Review                                    
  
  
Auto-Generat  
ed Referral     2023       1               1  
  
  
  
                    Referral ID Status    Reason    Start Date Expiration Date V  
isits   
Requested                               Visits   
Authorized  
   
                                [id number]        Closed            
  
  
Auto-Generate  
d Referral      2023       1               1  
  
  
  
                          Specialty    Diagnoses / Procedures Referred By Contac  
t Referred To Contact  
   
                                                              
  
  
Diagnoses  
  
  
Hepatocellular carcinoma   
(HCC)  
  
  
  
Procedures  
  
  
CT SIM PLANNING RADIATION   
ONCOLOGY  
  
  
THER RAD SIMULAJ-AIDED FIELD   
SETTING COMPLEX                           
  
  
Marin Fish MD  
  
  
[address] [place] [address]  
  
  
Phone: [phone]  
  
  
Fax: [phone]                         
  
  
  
  
  
                          Referral ID  Status       Reason       Start   
Date                                    Expiration   
Date                                    Visits   
Requested                               Visits   
Authorized  
   
                                        [id number]            Pending   
Review                                    
  
  
PCP Requested   
Referral        2023       1               1  
  
  
  
                          Specialty    Diagnoses / Procedures Referred By Contac  
t Referred To Contact  
   
                                        MR IMAGING            
  
  
Diagnoses  
  
  
Liver disease  
  
  
  
Procedures  
  
  
MRI LIVER WO/W IVCON  
  
  
MRI ABDOMEN W/O &   
W/CONTRAST MATERIAL                       
  
  
Marin Fish MD  
  
  
[address] [place] [address]  
  
  
Phone: [phone]  
  
  
Fax: [phone]                         
  
  
[facility]  
  
  
[place] [address]  
  
  
  
                    Referral ID Status    Reason    Start Date Expiration Date V  
isits   
Requested                               Visits   
Authorized  
   
                                [id number]        Closed            
  
  
Auto-Generate  
d Referral      2023       1               1  
  
  
  
Medications Administered Section  
     Inactive Administered Medications - up to 3 most recent administrations  
  
                    Medication Order MAR Action Action Date Dose      Rate        
Site  
   
                                                      
  
  
iron sucrose 200 mg in   
NaCl 0.9% 100ml   
(VENOFER)  
  
  
200 mg, INTRAVENOUS, at   
400 mL/hr, Administer   
over 15 Minutes, ONCE, 1   
dose, On 23 at   
1500, Please conduct a   
30 minute post dose   
observation.                            New   
Bag/Syringe/Bottle                      2023 3:11 PM   
EST                 200 mg              400 mL/hr             
   
                                                               
  
     Inactive Administered Medications - up to 3 most recent administrations  
  
                    Medication Order MAR Action Action Date Dose      Rate        
Site  
   
                                                      
  
  
iron sucrose 200 mg in   
NaCl 0.9% 100ml   
(VENOFER)  
  
  
200 mg, INTRAVENOUS, at   
400 mL/hr, Administer   
over 15 Minutes, ONCE, 1   
dose, On 23 at   
1500, Please conduct a   
30 minute post dose   
observation.                            New   
Bag/Syringe/Bottle                      2023 3:10 PM   
EST                 200 mg              400 mL/hr             
   
                                                               
  
     Inactive Administered Medications - up to 3 most recent administrations  
  
                    Medication Order MAR Action Action Date Dose      Rate        
Site  
   
                                                      
  
  
iron sucrose 200 mg in   
NaCl 0.9% 100ml   
(VENOFER)  
  
  
200 mg, INTRAVENOUS, at   
400 mL/hr, Administer   
over 15 Minutes, ONCE, 1   
dose, On 23 at   
1530, Please conduct a   
30 minute post dose   
observation.                            New   
Bag/Syringe/Bottle                      2023 3:45 PM   
EST                 200 mg              400 mL/hr             
   
                                                               
  
     Inactive Administered Medications - up to 3 most recent administrations  
  
                    Medication Order MAR Action Action Date Dose      Rate        
Site  
   
                                                      
  
  
iron sucrose 200 mg in   
NaCl 0.9% 100ml   
(VENOFER)  
  
  
200 mg, INTRAVENOUS, at   
400 mL/hr, Administer   
over 15 Minutes, ONCE, 1   
dose, On 23 at   
1530, Please conduct a   
30 minute post dose   
observation.                            New   
Bag/Syringe/Bottle                      2023 3:14 PM   
EST                 200 mg              400 mL/hr             
   
                                                               
  
     Inactive Administered Medications - up to 3 most recent administrations  
  
                    Medication Order MAR Action Action Date Dose      Rate        
Site  
   
                                                      
  
  
iron sucrose 200 mg in   
NaCl 0.9% 100ml   
(VENOFER)  
  
  
200 mg, INTRAVENOUS, at   
400 mL/hr, Administer   
over 15 Minutes, ONCE, 1   
dose, On 23 at   
1530, Please conduct a   
30 minute post dose   
observation.                            New   
Bag/Syringe/Bottle                      2023 3:14 PM   
EST                 200 mg              400 mL/hr             
   
                                                               
  
     Inactive Administered Medications - up to 3 most recent administrations  
  
                    Medication Order MAR Action Action Date Dose      Rate        
Site  
   
                                                      
  
  
iron sucrose 200 mg in   
NaCl 0.9% 100ml   
(VENOFER)  
  
  
200 mg, INTRAVENOUS, at   
400 mL/hr, Administer   
over 15 Minutes, ONCE, 1   
dose, On Fri 2/10/23 at   
1500, Please conduct a   
30 minute post dose   
observation.                            New   
Bag/Syringe/Bottle                      02/10/2023 2:46 PM   
EST                 200 mg              400 mL/hr             
   
                                                               
  
     Inactive Administered Medications - up to 3 most recent administrations  
  
                    Medication Order MAR Action Action Date Dose      Rate        
Site  
   
                                                      
  
  
iron sucrose 200 mg in   
NaCl 0.9% 100ml   
(VENOFER)  
  
  
200 mg, INTRAVENOUS, at   
400 mL/hr, Administer   
over 15 Minutes, ONCE, 1   
dose, On 23 at   
1600, Please conduct a   
30 minute post dose   
observation.                            New   
Bag/Syringe/Bottle                      2023 3:50 PM   
EST                 200 mg              400 mL/hr             
   
                                                               
  
     Inactive Administered Medications - up to 3 most recent administrations  
  
                    Medication Order MAR Action Action Date Dose      Rate        
Site  
   
                                                      
  
  
iron sucrose 200 mg in   
NaCl 0.9% 100ml   
(VENOFER)  
  
  
200 mg, INTRAVENOUS, at   
400 mL/hr, Administer   
over 15 Minutes, ONCE, 1   
dose, On Wed 2/15/23 at   
1530, Please conduct a   
30 minute post dose   
observation.                            New   
Bag/Syringe/Bottle                      02/15/2023 3:24 PM   
EST                 200 mg              400 mL/hr             
   
                                                               
  
     Inactive Administered Medications - up to 3 most recent administrations  
  
                    Medication Order MAR Action Action Date Dose      Rate        
Site  
   
                                                      
  
  
iron sucrose 200 mg in   
NaCl 0.9% 100ml   
(VENOFER)  
  
  
200 mg, INTRAVENOUS, at   
400 mL/hr, Administer   
over 15 Minutes, ONCE, 1   
dose, On 23 at   
1000, Please conduct a   
30 minute post dose   
observation.                            New   
Bag/Syringe/Bottle                      2023 9:44 AM   
EDT                 200 mg              400 mL/hr             
   
                                                               
  
     Inactive Administered Medications - up to 3 most recent administrations  
  
                    Medication Order MAR Action Action Date Dose      Rate        
Site  
   
                                                      
  
  
iron sucrose 200 mg in   
NaCl 0.9% 100ml   
(VENOFER)  
  
  
200 mg, INTRAVENOUS, at   
400 mL/hr, Administer   
over 15 Minutes, ONCE, 1   
dose, On 23 at   
1000, Please conduct a   
30 minute post dose   
observation.                            New   
Bag/Syringe/Bottle                      2023 10:10 AM   
EDT                 200 mg              400 mL/hr             
   
                                                               
  
     Inactive Administered Medications - up to 3 most recent administrations  
  
                    Medication Order MAR Action Action Date Dose      Rate        
Site  
   
                                                      
  
  
iron sucrose 200 mg in   
NaCl 0.9% 100ml   
(VENOFER)  
  
  
200 mg, INTRAVENOUS, at   
400 mL/hr, Administer   
over 15 Minutes, ONCE, 1   
dose, On 23 at   
1000, Please conduct a   
30 minute post dose   
observation.                            New   
Bag/Syringe/Bottle                      2023 10:06 AM   
EDT                 200 mg              400 mL/hr             
   
                                                               
  
     Inactive Administered Medications - up to 3 most recent administrations  
  
                    Medication Order MAR Action Action Date Dose      Rate        
Site  
   
                                                      
  
  
iron sucrose 200 mg in   
NaCl 0.9% 100ml   
(VENOFER)  
  
  
200 mg, INTRAVENOUS, at   
400 mL/hr, Administer   
over 15 Minutes, ONCE, 1   
dose, On 23 at   
1200, Please conduct a   
30 minute post dose   
observation.                            New   
Bag/Syringe/Bottle                      2023 11:55 AM   
EDT                 200 mg              400 mL/hr             
   
                                                               
  
     Inactive Administered Medications - up to 3 most recent administrations  
  
                    Medication Order MAR Action Action Date Dose      Rate        
Site  
   
                                                      
  
  
iron sucrose 200 mg in   
NaCl 0.9% 100ml   
(VENOFER)  
  
  
200 mg, INTRAVENOUS, at   
400 mL/hr, Administer   
over 15 Minutes, ONCE, 1   
dose, On 23 at   
0800, Please conduct a   
30 minute post dose   
observation.                            New   
Bag/Syringe/Bottle                      2023 9:55 AM   
EDT                 200 mg              400 mL/hr             
   
                                                               
  
  
  
Additional Source Comments  
  
  
  
                                                    PREGNANCY (unrecognized sect  
ion and content)  
   
                                                    No Pregnancy Status Records   
FoundNo Pregnancy Status Records FoundNo Pregnancy   
Status   
Records FoundNo Pregnancy Status Records FoundNo Pregnancy Status Records 
FoundNo   
Pregnancy Status Records Found  
  
  
  
  
                                                    INFORMATION SOURCE (unrecogn  
ized section and content)  
   
                                          
  
  
  
                                          
   
                                          
  
  
  
                                DATE CREATED    AUTHOR          AUTHOR'S ORGANIZ  
ATION  
   
                                2018                      [facility]  
  
  
  
                                DATE CREATED    AUTHOR          AUTHOR'S ORGANIZ  
ATION  
   
                                2018                      [facility]  
  
  
  
                                DATE CREATED    AUTHOR          AUTHOR'S ORGANIZ  
ATION  
   
                                2023                      [facility]  
[facility]  
  
  
  
                                DATE CREATED    AUTHOR          AUTHOR'S ORGANIZ  
ATION  
   
                                2023                      [facility]  
nter  
  
  
  
                                DATE CREATED    AUTHOR          AUTHOR'S ORGANIZ  
ATION  
   
                                2023                      [facility]  
  
  
  
  
  
                                                    Source Comments (unrecognize  
d section and content)  
   
                                                    In the event this informatio  
n is protected by the Federal Confidentiality of   
Alcohol   
and Drug Abuse Patient Records regulations: This information has been disclosed 
to   
you from records protected by Federal confidentiality rules (42 CFR Part 2). The
   
Federal rules prohibit you from making any further disclosure of this 
information   
unless further disclosure is expressly permitted by the written consent of the 
person   
to whom it pertains or as otherwise permitted by 42 CFR Part 2. A general   
authorization for the release of medical or other information is NOT sufficient 
for   
this purpose. The Federal rules restrict any use of the information to 
criminally   
investigate or prosecute any alcohol or drug abuse patient.[facility] Hospital[facility] 
the   
event this information is protected by the Federal Confidentiality of Alcohol 
and   
Drug Abuse Patient Records regulations: This information has been disclosed to 
you   
from records protected by Federal confidentiality rules (42 CFR Part 2). The 
Federal   
rules prohibit you from making any further disclosure of this information unless
   
further disclosure is expressly permitted by the written consent of the person 
to   
whom it pertains or as otherwise permitted by 42 CFR Part 2. A general 
authorization   
for the release of medical or other information is NOT sufficient for this 
purpose.   
The Federal rules restrict any use of the information to criminally investigate 
or   
prosecute any alcohol or drug abuse patient.[facility] HospitalIn the event this   
information is protected by the Federal Confidentiality of Alcohol and Drug 
Abuse   
Patient Records regulations: This information has been disclosed to you from 
records   
protected by Federal confidentiality rules (42 CFR Part 2). The Federal rules   
prohibit you from making any further disclosure of this information unless 
further   
disclosure is expressly permitted by the written consent of the person to whom 
it   
pertains or as otherwise permitted by 42 CFR Part 2. A general authorization for
 the   
release of medical or other information is NOT sufficient for this purpose. The   
Federal rules restrict any use of the information to criminally investigate or   
prosecute any alcohol or drug abuse patient.[facility] HospitalIn the event this   
information is protected by the Federal Confidentiality of Alcohol and Drug 
Abuse   
Patient Records regulations: This information has been disclosed to you from 
records   
protected by Federal confidentiality rules (42 CFR Part 2). The Federal rules   
prohibit you from making any further disclosure of this information unless 
further   
disclosure is expressly permitted by the written consent of the person to whom 
it   
pertains or as otherwise permitted by 42 CFR Part 2. A general authorization for
 the   
release of medical or other information is NOT sufficient for this purpose. The   
Federal rules restrict any use of the information to criminally investigate or   
prosecute any alcohol or drug abuse patient.[facility] [facility] the event this   
information is protected by the Federal Confidentiality of Alcohol and Drug 
Abuse   
Patient Records regulations: This information has been disclosed to you from 
records   
protected by Federal confidentiality rules (42 CFR Part 2). The Federal rules   
prohibit you from making any further disclosure of this information unless 
further   
disclosure is expressly permitted by the written consent of the person to whom 
it   
pertains or as otherwise permitted by 42 CFR Part 2. A general authorization for
 the   
release of medical or other information is NOT sufficient for this purpose. The   
Federal rules restrict any use of the information to criminally investigate or   
prosecute any alcohol or drug abuse patient.[facility]In the event this   
information is protected by the Federal Confidentiality of Alcohol and Drug 
Abuse   
Patient Records regulations: This information has been disclosed to you from 
records   
protected by Federal confidentiality rules (42 CFR Part 2). The Federal rules   
prohibit you from making any further disclosure of this information unless 
further   
disclosure is expressly permitted by the written consent of the person to whom 
it   
pertains or as otherwise permitted by 42 CFR Part 2. A general authorization for
 the   
release of medical or other information is NOT sufficient for this purpose. The   
Federal rules restrict any use of the information to criminally investigate or   
prosecute any alcohol or drug abuse patient.[facility]In the event this   
information is protected by the Federal Confidentiality of Alcohol and Drug 
Abuse   
Patient Records regulations: This information has been disclosed to you from 
records   
protected by Federal confidentiality rules (42 CFR Part 2). The Federal rules   
prohibit you from making any further disclosure of this information unless 
further   
disclosure is expressly permitted by the written consent of the person to whom 
it   
pertains or as otherwise permitted by 42 CFR Part 2. A general authorization for
 the   
release of medical or other information is NOT sufficient for this purpose. The   
Federal rules restrict any use of the information to criminally investigate or   
prosecute any alcohol or drug abuse patient.[facility]In the event this   
information is protected by the Federal Confidentiality of Alcohol and Drug 
Abuse   
Patient Records regulations: This information has been disclosed to you from 
records   
protected by Federal confidentiality rules (42 CFR Part 2). The Federal rules   
prohibit you from making any further disclosure of this information unless 
further   
disclosure is expressly permitted by the written consent of the person to whom 
it   
pertains or as otherwise permitted by 42 CFR Part 2. A general authorization for
 the   
release of medical or other information is NOT sufficient for this purpose. The   
Federal rules restrict any use of the information to criminally investigate or   
prosecute any alcohol or drug abuse patient.[facility] HospitalIn the event this   
information is protected by the Federal Confidentiality of Alcohol and Drug 
Abuse   
Patient Records regulations: This information has been disclosed to you from 
records   
protected by Federal confidentiality rules (42 CFR Part 2). The Federal rules   
prohibit you from making any further disclosure of this information unless 
further   
disclosure is expressly permitted by the written consent of the person to whom 
it   
pertains or as otherwise permitted by 42 CFR Part 2. A general authorization for
 the   
release of medical or other information is NOT sufficient for this purpose. The   
Federal rules restrict any use of the information to criminally investigate or   
prosecute any alcohol or drug abuse patient.[facility]In the event this   
information is protected by the Federal Confidentiality of Alcohol and Drug 
Abuse   
Patient Records regulations: This information has been disclosed to you from 
records   
protected by Federal confidentiality rules (42 CFR Part 2). The Federal rules   
prohibit you from making any further disclosure of this information unless 
further   
disclosure is expressly permitted by the written consent of the person to whom 
it   
pertains or as otherwise permitted by 42 CFR Part 2. A general authorization for
 the   
release of medical or other information is NOT sufficient for this purpose. The   
Federal rules restrict any use of the information to criminally investigate or   
prosecute any alcohol or drug abuse patient.[facility] HospitalIn the event this   
information is protected by the Federal Confidentiality of Alcohol and Drug 
Abuse   
Patient Records regulations: This information has been disclosed to you from 
records   
protected by Federal confidentiality rules (42 CFR Part 2). The Federal rules   
prohibit you from making any further disclosure of this information unless 
further   
disclosure is expressly permitted by the written consent of the person to whom 
it   
pertains or as otherwise permitted by 42 CFR Part 2. A general authorization for
 the   
release of medical or other information is NOT sufficient for this purpose. The   
Federal rules restrict any use of the information to criminally investigate or   
prosecute any alcohol or drug abuse patient.[facility] HospitalIn the event this   
information is protected by the Federal Confidentiality of Alcohol and Drug 
Abuse   
Patient Records regulations: This information has been disclosed to you from 
records   
protected by Federal confidentiality rules (42 CFR Part 2). The Federal rules   
prohibit you from making any further disclosure of this information unless 
further   
disclosure is expressly permitted by the written consent of the person to whom 
it   
pertains or as otherwise permitted by 42 CFR Part 2. A general authorization for
 the   
release of medical or other information is NOT sufficient for this purpose. The   
Federal rules restrict any use of the information to criminally investigate or   
prosecute any alcohol or drug abuse patient.[facility] HospitalIn the event this   
information is protected by the Federal Confidentiality of Alcohol and Drug 
Abuse   
Patient Records regulations: This information has been disclosed to you from 
records   
protected by Federal confidentiality rules (42 CFR Part 2). The Federal rules   
prohibit you from making any further disclosure of this information unless 
further   
disclosure is expressly permitted by the written consent of the person to whom 
it   
pertains or as otherwise permitted by 42 CFR Part 2. A general authorization for
 the   
release of medical or other information is NOT sufficient for this purpose. The   
Federal rules restrict any use of the information to criminally investigate or   
prosecute any alcohol or drug abuse patient.[facility] HospitalIn the event this   
information is protected by the Federal Confidentiality of Alcohol and Drug 
Abuse   
Patient Records regulations: This information has been disclosed to you from 
records   
protected by Federal confidentiality rules (42 CFR Part 2). The Federal rules   
prohibit you from making any further disclosure of this information unless 
further   
disclosure is expressly permitted by the written consent of the person to whom 
it   
pertains or as otherwise permitted by 42 CFR Part 2. A general authorization for
 the   
release of medical or other information is NOT sufficient for this purpose. The   
Federal rules restrict any use of the information to criminally investigate or   
prosecute any alcohol or drug abuse patient.[facility] HospitalIn the event this   
information is protected by the Federal Confidentiality of Alcohol and Drug 
Abuse   
Patient Records regulations: This information has been disclosed to you from 
records   
protected by Federal confidentiality rules (42 CFR Part 2). The Federal rules   
prohibit you from making any further disclosure of this information unless 
further   
disclosure is expressly permitted by the written consent of the person to whom 
it   
pertains or as otherwise permitted by 42 CFR Part 2. A general authorization for
 the   
release of medical or other information is NOT sufficient for this purpose. The   
Federal rules restrict any use of the information to criminally investigate or   
prosecute any alcohol or drug abuse patient.[facility] the event this   
information is protected by the Federal Confidentiality of Alcohol and Drug 
Abuse   
Patient Records regulations: This information has been disclosed to you from 
records   
protected by Federal confidentiality rules (42 CFR Part 2). The Federal rules   
prohibit you from making any further disclosure of this information unless 
further   
disclosure is expressly permitted by the written consent of the person to whom 
it   
pertains or as otherwise permitted by 42 CFR Part 2. A general authorization for
 the   
release of medical or other information is NOT sufficient for this purpose. The   
Federal rules restrict any use of the information to criminally investigate or   
prosecute any alcohol or drug abuse patient.[facility] HospitalIn the event this   
information is protected by the Federal Confidentiality of Alcohol and Drug 
Abuse   
Patient Records regulations: This information has been disclosed to you from 
records   
protected by Federal confidentiality rules (42 CFR Part 2). The Federal rules   
prohibit you from making any further disclosure of this information unless 
further   
disclosure is expressly permitted by the written consent of the person to whom 
it   
pertains or as otherwise permitted by 42 CFR Part 2. A general authorization for
 the   
release of medical or other information is NOT sufficient for this purpose. The   
Federal rules restrict any use of the information to criminally investigate or   
prosecute any alcohol or drug abuse patient.[facility]In the event this   
information is protected by the Federal Confidentiality of Alcohol and Drug 
Abuse   
Patient Records regulations: This information has been disclosed to you from 
records   
protected by Federal confidentiality rules (42 CFR Part 2). The Federal rules   
prohibit you from making any further disclosure of this information unless 
further   
disclosure is expressly permitted by the written consent of the person to whom 
it   
pertains or as otherwise permitted by 42 CFR Part 2. A general authorization for
 the   
release of medical or other information is NOT sufficient for this purpose. The   
Federal rules restrict any use of the information to criminally investigate or   
prosecute any alcohol or drug abuse patient.[facility] HospitalIn the event this   
information is protected by the Federal Confidentiality of Alcohol and Drug 
Abuse   
Patient Records regulations: This information has been disclosed to you from 
records   
protected by Federal confidentiality rules (42 CFR Part 2). The Federal rules   
prohibit you from making any further disclosure of this information unless 
further   
disclosure is expressly permitted by the written consent of the person to whom 
it   
pertains or as otherwise permitted by 42 CFR Part 2. A general authorization for
 the   
release of medical or other information is NOT sufficient for this purpose. The   
Federal rules restrict any use of the information to criminally investigate or   
prosecute any alcohol or drug abuse patient.[facility]In the event this   
information is protected by the Federal Confidentiality of Alcohol and Drug 
Abuse   
Patient Records regulations: This information has been disclosed to you from 
records   
protected by Federal confidentiality rules (42 CFR Part 2). The Federal rules   
prohibit you from making any further disclosure of this information unless 
further   
disclosure is expressly permitted by the written consent of the person to whom 
it   
pertains or as otherwise permitted by 42 CFR Part 2. A general authorization for
 the   
release of medical or other information is NOT sufficient for this purpose. The   
Federal rules restrict any use of the information to criminally investigate or   
prosecute any alcohol or drug abuse patient.[facility]In the event this   
information is protected by the Federal Confidentiality of Alcohol and Drug 
Abuse   
Patient Records regulations: This information has been disclosed to you from 
records   
protected by Federal confidentiality rules (42 CFR Part 2). The Federal rules   
prohibit you from making any further disclosure of this information unless 
further   
disclosure is expressly permitted by the written consent of the person to whom 
it   
pertains or as otherwise permitted by 42 CFR Part 2. A general authorization for
 the   
release of medical or other information is NOT sufficient for this purpose. The   
Federal rules restrict any use of the information to criminally investigate or   
prosecute any alcohol or drug abuse patient.[facility]In the event this   
information is protected by the Federal Confidentiality of Alcohol and Drug 
Abuse   
Patient Records regulations: This information has been disclosed to you from 
records   
protected by Federal confidentiality rules (42 CFR Part 2). The Federal rules   
prohibit you from making any further disclosure of this information unless 
further   
disclosure is expressly permitted by the written consent of the person to whom 
it   
pertains or as otherwise permitted by 42 CFR Part 2. A general authorization for
 the   
release of medical or other information is NOT sufficient for this purpose. The   
Federal rules restrict any use of the information to criminally investigate or   
prosecute any alcohol or drug abuse patient.[facility] HospitalIn the event this   
information is protected by the Federal Confidentiality of Alcohol and Drug 
Abuse   
Patient Records regulations: This information has been disclosed to you from 
records   
protected by Federal confidentiality rules (42 CFR Part 2). The Federal rules   
prohibit you from making any further disclosure of this information unless 
further   
disclosure is expressly permitted by the written consent of the person to whom 
it   
pertains or as otherwise permitted by 42 CFR Part 2. A general authorization for
 the   
release of medical or other information is NOT sufficient for this purpose. The   
Federal rules restrict any use of the information to criminally investigate or   
prosecute any alcohol or drug abuse patient.[facility]In the event this   
information is protected by the Federal Confidentiality of Alcohol and Drug 
Abuse   
Patient Records regulations: This information has been disclosed to you from 
records   
protected by Federal confidentiality rules (42 CFR Part 2). The Federal rules   
prohibit you from making any further disclosure of this information unless 
further   
disclosure is expressly permitted by the written consent of the person to whom 
it   
pertains or as otherwise permitted by 42 CFR Part 2. A general authorization for
 the   
release of medical or other information is NOT sufficient for this purpose. The   
Federal rules restrict any use of the information to criminally investigate or   
prosecute any alcohol or drug abuse patient.[facility]In the event this   
information is protected by the Federal Confidentiality of Alcohol and Drug 
Abuse   
Patient Records regulations: This information has been disclosed to you from 
records   
protected by Federal confidentiality rules (42 CFR Part 2). The Federal rules   
prohibit you from making any further disclosure of this information unless 
further   
disclosure is expressly permitted by the written consent of the person to whom 
it   
pertains or as otherwise permitted by 42 CFR Part 2. A general authorization for
 the   
release of medical or other information is NOT sufficient for this purpose. The   
Federal rules restrict any use of the information to criminally investigate or   
prosecute any alcohol or drug abuse patient.[facility] HospitalIn the event this   
information is protected by the Federal Confidentiality of Alcohol and Drug 
Abuse   
Patient Records regulations: This information has been disclosed to you from 
records   
protected by Federal confidentiality rules (42 CFR Part 2). The Federal rules   
prohibit you from making any further disclosure of this information unless 
further   
disclosure is expressly permitted by the written consent of the person to whom 
it   
pertains or as otherwise permitted by 42 CFR Part 2. A general authorization for
 the   
release of medical or other information is NOT sufficient for this purpose. The   
Federal rules restrict any use of the information to criminally investigate or   
prosecute any alcohol or drug abuse patient.[facility]In the event this   
information is protected by the Federal Confidentiality of Alcohol and Drug 
Abuse   
Patient Records regulations: This information has been disclosed to you from 
records   
protected by Federal confidentiality rules (42 CFR Part 2). The Federal rules   
prohibit you from making any further disclosure of this information unless 
further   
disclosure is expressly permitted by the written consent of the person to whom 
it   
pertains or as otherwise permitted by 42 CFR Part 2. A general authorization for
 the   
release of medical or other information is NOT sufficient for this purpose. The   
Federal rules restrict any use of the information to criminally investigate or   
prosecute any alcohol or drug abuse patient.[facility]In the event this   
information is protected by the Federal Confidentiality of Alcohol and Drug 
Abuse   
Patient Records regulations: This information has been disclosed to you from 
records   
protected by Federal confidentiality rules (42 CFR Part 2). The Federal rules   
prohibit you from making any further disclosure of this information unless 
further   
disclosure is expressly permitted by the written consent of the person to whom 
it   
pertains or as otherwise permitted by 42 CFR Part 2. A general authorization for
 the   
release of medical or other information is NOT sufficient for this purpose. The   
Federal rules restrict any use of the information to criminally investigate or   
prosecute any alcohol or drug abuse patient.[facility]In the event this   
information is protected by the Federal Confidentiality of Alcohol and Drug 
Abuse   
Patient Records regulations: This information has been disclosed to you from 
records   
protected by Federal confidentiality rules (42 CFR Part 2). The Federal rules   
prohibit you from making any further disclosure of this information unless 
further   
disclosure is expressly permitted by the written consent of the person to whom 
it   
pertains or as otherwise permitted by 42 CFR Part 2. A general authorization for
 the   
release of medical or other information is NOT sufficient for this purpose. The   
Federal rules restrict any use of the information to criminally investigate or   
prosecute any alcohol or drug abuse patient.[facility] HospitalIn the event this   
information is protected by the Federal Confidentiality of Alcohol and Drug 
Abuse   
Patient Records regulations: This information has been disclosed to you from 
records   
protected by Federal confidentiality rules (42 CFR Part 2). The Federal rules   
prohibit you from making any further disclosure of this information unless 
further   
disclosure is expressly permitted by the written consent of the person to whom 
it   
pertains or as otherwise permitted by 42 CFR Part 2. A general authorization for
 the   
release of medical or other information is NOT sufficient for this purpose. The   
Federal rules restrict any use of the information to criminally investigate or   
prosecute any alcohol or drug abuse patient.[facility]In the event this   
information is protected by the Federal Confidentiality of Alcohol and Drug 
Abuse   
Patient Records regulations: This information has been disclosed to you from 
records   
protected by Federal confidentiality rules (42 CFR Part 2). The Federal rules   
prohibit you from making any further disclosure of this information unless 
further   
disclosure is expressly permitted by the written consent of the person to whom 
it   
pertains or as otherwise permitted by 42 CFR Part 2. A general authorization for
 the   
release of medical or other information is NOT sufficient for this purpose. The   
Federal rules restrict any use of the information to criminally investigate or   
prosecute any alcohol or drug abuse patient.[facility]In the event this   
information is protected by the Federal Confidentiality of Alcohol and Drug 
Abuse   
Patient Records regulations: This information has been disclosed to you from 
records   
protected by Federal confidentiality rules (42 CFR Part 2). The Federal rules   
prohibit you from making any further disclosure of this information unless 
further   
disclosure is expressly permitted by the written consent of the person to whom 
it   
pertains or as otherwise permitted by 42 CFR Part 2. A general authorization for
 the   
release of medical or other information is NOT sufficient for this purpose. The   
Federal rules restrict any use of the information to criminally investigate or   
prosecute any alcohol or drug abuse patient.[facility]In the event this   
information is protected by the Federal Confidentiality of Alcohol and Drug 
Abuse   
Patient Records regulations: This information has been disclosed to you from 
records   
protected by Federal confidentiality rules (42 CFR Part 2). The Federal rules   
prohibit you from making any further disclosure of this information unless 
further   
disclosure is expressly permitted by the written consent of the person to whom 
it   
pertains or as otherwise permitted by 42 CFR Part 2. A general authorization for
 the   
release of medical or other information is NOT sufficient for this purpose. The   
Federal rules restrict any use of the information to criminally investigate or   
prosecute any alcohol or drug abuse patient.[facility]In the event this   
information is protected by the Federal Confidentiality of Alcohol and Drug 
Abuse   
Patient Records regulations: This information has been disclosed to you from 
records   
protected by Federal confidentiality rules (42 CFR Part 2). The Federal rules   
prohibit you from making any further disclosure of this information unless 
further   
disclosure is expressly permitted by the written consent of the person to whom 
it   
pertains or as otherwise permitted by 42 CFR Part 2. A general authorization for
 the   
release of medical or other information is NOT sufficient for this purpose. The   
Federal rules restrict any use of the information to criminally investigate or   
prosecute any alcohol or drug abuse patient.[facility]In the event this   
information is protected by the Federal Confidentiality of Alcohol and Drug 
Abuse   
Patient Records regulations: This information has been disclosed to you from 
records   
protected by Federal confidentiality rules (42 CFR Part 2). The Federal rules   
prohibit you from making any further disclosure of this information unless 
further   
disclosure is expressly permitted by the written consent of the person to whom 
it   
pertains or as otherwise permitted by 42 CFR Part 2. A general authorization for
 the   
release of medical or other information is NOT sufficient for this purpose. The   
Federal rules restrict any use of the information to criminally investigate or   
prosecute any alcohol or drug abuse patient.[facility] HospitalIn the event this   
information is protected by the Federal Confidentiality of Alcohol and Drug 
Abuse   
Patient Records regulations: This information has been disclosed to you from 
records   
protected by Federal confidentiality rules (42 CFR Part 2). The Federal rules   
prohibit you from making any further disclosure of this information unless 
further   
disclosure is expressly permitted by the written consent of the person to whom 
it   
pertains or as otherwise permitted by 42 CFR Part 2. A general authorization for
 the   
release of medical or other information is NOT sufficient for this purpose. The   
Federal rules restrict any use of the information to criminally investigate or   
prosecute any alcohol or drug abuse patient.[facility]In the event this   
information is protected by the Federal Confidentiality of Alcohol and Drug 
Abuse   
Patient Records regulations: This information has been disclosed to you from 
records   
protected by Federal confidentiality rules (42 CFR Part 2). The Federal rules   
prohibit you from making any further disclosure of this information unless 
further   
disclosure is expressly permitted by the written consent of the person to whom 
it   
pertains or as otherwise permitted by 42 CFR Part 2. A general authorization for
 the   
release of medical or other information is NOT sufficient for this purpose. The   
Federal rules restrict any use of the information to criminally investigate or   
prosecute any alcohol or drug abuse patient.[facility] HospitalIn the event this   
information is protected by the Federal Confidentiality of Alcohol and Drug 
Abuse   
Patient Records regulations: This information has been disclosed to you from 
records   
protected by Federal confidentiality rules (42 CFR Part 2). The Federal rules   
prohibit you from making any further disclosure of this information unless 
further   
disclosure is expressly permitted by the written consent of the person to whom 
it   
pertains or as otherwise permitted by 42 CFR Part 2. A general authorization for
 the   
release of medical or other information is NOT sufficient for this purpose. The   
Federal rules restrict any use of the information to criminally investigate or   
prosecute any alcohol or drug abuse patient.[facility] HospitalIn the event this   
information is protected by the Federal Confidentiality of Alcohol and Drug 
Abuse   
Patient Records regulations: This information has been disclosed to you from 
records   
protected by Federal confidentiality rules (42 CFR Part 2). The Federal rules   
prohibit you from making any further disclosure of this information unless 
further   
disclosure is expressly permitted by the written consent of the person to whom 
it   
pertains or as otherwise permitted by 42 CFR Part 2. A general authorization for
 the   
release of medical or other information is NOT sufficient for this purpose. The   
Federal rules restrict any use of the information to criminally investigate or   
prosecute any alcohol or drug abuse patient.[facility] HospitalIn the event this   
information is protected by the Federal Confidentiality of Alcohol and Drug 
Abuse   
Patient Records regulations: This information has been disclosed to you from 
records   
protected by Federal confidentiality rules (42 CFR Part 2). The Federal rules   
prohibit you from making any further disclosure of this information unless 
further   
disclosure is expressly permitted by the written consent of the person to whom 
it   
pertains or as otherwise permitted by 42 CFR Part 2. A general authorization for
 the   
release of medical or other information is NOT sufficient for this purpose. The   
Federal rules restrict any use of the information to criminally investigate or   
prosecute any alcohol or drug abuse patient.[facility] HospitalIn the event this   
information is protected by the Federal Confidentiality of Alcohol and Drug 
Abuse   
Patient Records regulations: This information has been disclosed to you from 
records   
protected by Federal confidentiality rules (42 CFR Part 2). The Federal rules   
prohibit you from making any further disclosure of this information unless 
further   
disclosure is expressly permitted by the written consent of the person to whom 
it   
pertains or as otherwise permitted by 42 CFR Part 2. A general authorization for
 the   
release of medical or other information is NOT sufficient for this purpose. The   
Federal rules restrict any use of the information to criminally investigate or   
prosecute any alcohol or drug abuse patient.[facility] HospitalIn the event this   
information is protected by the Federal Confidentiality of Alcohol and Drug 
Abuse   
Patient Records regulations: This information has been disclosed to you from 
records   
protected by Federal confidentiality rules (42 CFR Part 2). The Federal rules   
prohibit you from making any further disclosure of this information unless 
further   
disclosure is expressly permitted by the written consent of the person to whom 
it   
pertains or as otherwise permitted by 42 CFR Part 2. A general authorization for
 the   
release of medical or other information is NOT sufficient for this purpose. The   
Federal rules restrict any use of the information to criminally investigate or   
prosecute any alcohol or drug abuse patient.[facility] HospitalIn the event this   
information is protected by the Federal Confidentiality of Alcohol and Drug 
Abuse   
Patient Records regulations: This information has been disclosed to you from 
records   
protected by Federal confidentiality rules (42 CFR Part 2). The Federal rules   
prohibit you from making any further disclosure of this information unless 
further   
disclosure is expressly permitted by the written consent of the person to whom 
it   
pertains or as otherwise permitted by 42 CFR Part 2. A general authorization for
 the   
release of medical or other information is NOT sufficient for this purpose. The   
Federal rules restrict any use of the information to criminally investigate or   
prosecute any alcohol or drug abuse patient.[facility] HospitalIn the event this   
information is protected by the Federal Confidentiality of Alcohol and Drug 
Abuse   
Patient Records regulations: This information has been disclosed to you from 
records   
protected by Federal confidentiality rules (42 CFR Part 2). The Federal rules   
prohibit you from making any further disclosure of this information unless 
further   
disclosure is expressly permitted by the written consent of the person to whom 
it   
pertains or as otherwise permitted by 42 CFR Part 2. A general authorization for
 the   
release of medical or other information is NOT sufficient for this purpose. The   
Federal rules restrict any use of the information to criminally investigate or   
prosecute any alcohol or drug abuse patient.[facility] HospitalIn the event this   
information is protected by the Federal Confidentiality of Alcohol and Drug 
Abuse   
Patient Records regulations: This information has been disclosed to you from 
records   
protected by Federal confidentiality rules (42 CFR Part 2). The Federal rules   
prohibit you from making any further disclosure of this information unless 
further   
disclosure is expressly permitted by the written consent of the person to whom 
it   
pertains or as otherwise permitted by 42 CFR Part 2. A general authorization for
 the   
release of medical or other information is NOT sufficient for this purpose. The   
Federal rules restrict any use of the information to criminally investigate or   
prosecute any alcohol or drug abuse patient.[facility] HospitalIn the event this   
information is protected by the Federal Confidentiality of Alcohol and Drug 
Abuse   
Patient Records regulations: This information has been disclosed to you from 
records   
protected by Federal confidentiality rules (42 CFR Part 2). The Federal rules   
prohibit you from making any further disclosure of this information unless 
further   
disclosure is expressly permitted by the written consent of the person to whom 
it   
pertains or as otherwise permitted by 42 CFR Part 2. A general authorization for
 the   
release of medical or other information is NOT sufficient for this purpose. The   
Federal rules restrict any use of the information to criminally investigate or   
prosecute any alcohol or drug abuse patient.[facility] HospitalIn the event this   
information is protected by the Federal Confidentiality of Alcohol and Drug 
Abuse   
Patient Records regulations: This information has been disclosed to you from 
records   
protected by Federal confidentiality rules (42 CFR Part 2). The Federal rules   
prohibit you from making any further disclosure of this information unless 
further   
disclosure is expressly permitted by the written consent of the person to whom 
it   
pertains or as otherwise permitted by 42 CFR Part 2. A general authorization for
 the   
release of medical or other information is NOT sufficient for this purpose. The   
Federal rules restrict any use of the information to criminally investigate or   
prosecute any alcohol or drug abuse patient.[facility] HospitalIn the event this   
information is protected by the Federal Confidentiality of Alcohol and Drug 
Abuse   
Patient Records regulations: This information has been disclosed to you from 
records   
protected by Federal confidentiality rules (42 CFR Part 2). The Federal rules   
prohibit you from making any further disclosure of this information unless 
further   
disclosure is expressly permitted by the written consent of the person to whom 
it   
pertains or as otherwise permitted by 42 CFR Part 2. A general authorization for
 the   
release of medical or other information is NOT sufficient for this purpose. The   
Federal rules restrict any use of the information to criminally investigate or   
prosecute any alcohol or drug abuse patient.[facility] HospitalIn the event this   
information is protected by the Federal Confidentiality of Alcohol and Drug 
Abuse   
Patient Records regulations: This information has been disclosed to you from 
records   
protected by Federal confidentiality rules (42 CFR Part 2). The Federal rules   
prohibit you from making any further disclosure of this information unless 
further   
disclosure is expressly permitted by the written consent of the person to whom 
it   
pertains or as otherwise permitted by 42 CFR Part 2. A general authorization for
 the   
release of medical or other information is NOT sufficient for this purpose. The   
Federal rules restrict any use of the information to criminally investigate or   
prosecute any alcohol or drug abuse patient.[facility] HospitalIn the event this   
information is protected by the Federal Confidentiality of Alcohol and Drug 
Abuse   
Patient Records regulations: This information has been disclosed to you from 
records   
protected by Federal confidentiality rules (42 CFR Part 2). The Federal rules   
prohibit you from making any further disclosure of this information unless 
further   
disclosure is expressly permitted by the written consent of the person to whom 
it   
pertains or as otherwise permitted by 42 CFR Part 2. A general authorization for
 the   
release of medical or other information is NOT sufficient for this purpose. The   
Federal rules restrict any use of the information to criminally investigate or   
prosecute any alcohol or drug abuse patient.[facility] HospitalIn the event this   
information is protected by the Federal Confidentiality of Alcohol and Drug 
Abuse   
Patient Records regulations: This information has been disclosed to you from 
records   
protected by Federal confidentiality rules (42 CFR Part 2). The Federal rules   
prohibit you from making any further disclosure of this information unless 
further   
disclosure is expressly permitted by the written consent of the person to whom 
it   
pertains or as otherwise permitted by 42 CFR Part 2. A general authorization for
 the   
release of medical or other information is NOT sufficient for this purpose. The   
Federal rules restrict any use of the information to criminally investigate or   
prosecute any alcohol or drug abuse patient.[facility] HospitalIn the event this   
information is protected by the Federal Confidentiality of Alcohol and Drug 
Abuse   
Patient Records regulations: This information has been disclosed to you from 
records   
protected by Federal confidentiality rules (42 CFR Part 2). The Federal rules   
prohibit you from making any further disclosure of this information unless 
further   
disclosure is expressly permitted by the written consent of the person to whom 
it   
pertains or as otherwise permitted by 42 CFR Part 2. A general authorization for
 the   
release of medical or other information is NOT sufficient for this purpose. The   
Federal rules restrict any use of the information to criminally investigate or   
prosecute any alcohol or drug abuse patient.[facility] HospitalIn the event this   
information is protected by the Federal Confidentiality of Alcohol and Drug 
Abuse   
Patient Records regulations: This information has been disclosed to you from 
records   
protected by Federal confidentiality rules (42 CFR Part 2). The Federal rules   
prohibit you from making any further disclosure of this information unless 
further   
disclosure is expressly permitted by the written consent of the person to whom 
it   
pertains or as otherwise permitted by 42 CFR Part 2. A general authorization for
 the   
release of medical or other information is NOT sufficient for this purpose. The   
Federal rules restrict any use of the information to criminally investigate or   
prosecute any alcohol or drug abuse patient.[facility] HospitalIn the event this   
information is protected by the Federal Confidentiality of Alcohol and Drug 
Abuse   
Patient Records regulations: This information has been disclosed to you from 
records   
protected by Federal confidentiality rules (42 CFR Part 2). The Federal rules   
prohibit you from making any further disclosure of this information unless 
further   
disclosure is expressly permitted by the written consent of the person to whom 
it   
pertains or as otherwise permitted by 42 CFR Part 2. A general authorization for
 the   
release of medical or other information is NOT sufficient for this purpose. The   
Federal rules restrict any use of the information to criminally investigate or   
prosecute any alcohol or drug abuse patient.[facility] HospitalIn the event this   
information is protected by the Federal Confidentiality of Alcohol and Drug 
Abuse   
Patient Records regulations: This information has been disclosed to you from 
records   
protected by Federal confidentiality rules (42 CFR Part 2). The Federal rules   
prohibit you from making any further disclosure of this information unless 
further   
disclosure is expressly permitted by the written consent of the person to whom 
it   
pertains or as otherwise permitted by 42 CFR Part 2. A general authorization for
 the   
release of medical or other information is NOT sufficient for this purpose. The   
Federal rules restrict any use of the information to criminally investigate or   
prosecute any alcohol or drug abuse patient.[facility] HospitalIn the event this   
information is protected by the Federal Confidentiality of Alcohol and Drug 
Abuse   
Patient Records regulations: This information has been disclosed to you from 
records   
protected by Federal confidentiality rules (42 CFR Part 2). The Federal rules   
prohibit you from making any further disclosure of this information unless 
further   
disclosure is expressly permitted by the written consent of the person to whom 
it   
pertains or as otherwise permitted by 42 CFR Part 2. A general authorization for
 the   
release of medical or other information is NOT sufficient for this purpose. The   
Federal rules restrict any use of the information to criminally investigate or   
prosecute any alcohol or drug abuse patient.[facility] HospitalIn the event this   
information is protected by the Federal Confidentiality of Alcohol and Drug 
Abuse   
Patient Records regulations: This information has been disclosed to you from 
records   
protected by Federal confidentiality rules (42 CFR Part 2). The Federal rules   
prohibit you from making any further disclosure of this information unless 
further   
disclosure is expressly permitted by the written consent of the person to whom 
it   
pertains or as otherwise permitted by 42 CFR Part 2. A general authorization for
 the   
release of medical or other information is NOT sufficient for this purpose. The   
Federal rules restrict any use of the information to criminally investigate or   
prosecute any alcohol or drug abuse patient.[facility] HospitalIn the event this   
information is protected by the Federal Confidentiality of Alcohol and Drug 
Abuse   
Patient Records regulations: This information has been disclosed to you from 
records   
protected by Federal confidentiality rules (42 CFR Part 2). The Federal rules   
prohibit you from making any further disclosure of this information unless 
further   
disclosure is expressly permitted by the written consent of the person to whom 
it   
pertains or as otherwise permitted by 42 CFR Part 2. A general authorization for
 the   
release of medical or other information is NOT sufficient for this purpose. The   
Federal rules restrict any use of the information to criminally investigate or   
prosecute any alcohol or drug abuse patient.[facility] HospitalIn the event this   
information is protected by the Federal Confidentiality of Alcohol and Drug 
Abuse   
Patient Records regulations: This information has been disclosed to you from 
records   
protected by Federal confidentiality rules (42 CFR Part 2). The Federal rules   
prohibit you from making any further disclosure of this information unless 
further   
disclosure is expressly permitted by the written consent of the person to whom 
it   
pertains or as otherwise permitted by 42 CFR Part 2. A general authorization for
 the   
release of medical or other information is NOT sufficient for this purpose. The   
Federal rules restrict any use of the information to criminally investigate or   
prosecute any alcohol or drug abuse patient.[facility] HospitalIn the event this   
information is protected by the Federal Confidentiality of Alcohol and Drug 
Abuse   
Patient Records regulations: This information has been disclosed to you from 
records   
protected by Federal confidentiality rules (42 CFR Part 2). The Federal rules   
prohibit you from making any further disclosure of this information unless 
further   
disclosure is expressly permitted by the written consent of the person to whom 
it   
pertains or as otherwise permitted by 42 CFR Part 2. A general authorization for
 the   
release of medical or other information is NOT sufficient for this purpose. The   
Federal rules restrict any use of the information to criminally investigate or   
prosecute any alcohol or drug abuse patient.[facility] HospitalIn the event this   
information is protected by the Federal Confidentiality of Alcohol and Drug 
Abuse   
Patient Records regulations: This information has been disclosed to you from 
records   
protected by Federal confidentiality rules (42 CFR Part 2). The Federal rules   
prohibit you from making any further disclosure of this information unless 
further   
disclosure is expressly permitted by the written consent of the person to whom 
it   
pertains or as otherwise permitted by 42 CFR Part 2. A general authorization for
 the   
release of medical or other information is NOT sufficient for this purpose. The   
Federal rules restrict any use of the information to criminally investigate or   
prosecute any alcohol or drug abuse patient.[facility] HospitalIn the event this   
information is protected by the Federal Confidentiality of Alcohol and Drug 
Abuse   
Patient Records regulations: This information has been disclosed to you from 
records   
protected by Federal confidentiality rules (42 CFR Part 2). The Federal rules   
prohibit you from making any further disclosure of this information unless 
further   
disclosure is expressly permitted by the written consent of the person to whom 
it   
pertains or as otherwise permitted by 42 CFR Part 2. A general authorization for
 the   
release of medical or other information is NOT sufficient for this purpose. The   
Federal rules restrict any use of the information to criminally investigate or   
prosecute any alcohol or drug abuse patient.[facility] HospitalIn the event this   
information is protected by the Federal Confidentiality of Alcohol and Drug 
Abuse   
Patient Records regulations: This information has been disclosed to you from 
records   
protected by Federal confidentiality rules (42 CFR Part 2). The Federal rules   
prohibit you from making any further disclosure of this information unless 
further   
disclosure is expressly permitted by the written consent of the person to whom 
it   
pertains or as otherwise permitted by 42 CFR Part 2. A general authorization for
 the   
release of medical or other information is NOT sufficient for this purpose. The   
Federal rules restrict any use of the information to criminally investigate or   
prosecute any alcohol or drug abuse patient.[facility] HospitalIn the event this   
information is protected by the Federal Confidentiality of Alcohol and Drug 
Abuse   
Patient Records regulations: This information has been disclosed to you from 
records   
protected by Federal confidentiality rules (42 CFR Part 2). The Federal rules   
prohibit you from making any further disclosure of this information unless 
further   
disclosure is expressly permitted by the written consent of the person to whom 
it   
pertains or as otherwise permitted by 42 CFR Part 2. A general authorization for
 the   
release of medical or other information is NOT sufficient for this purpose. The   
Federal rules restrict any use of the information to criminally investigate or   
prosecute any alcohol or drug abuse patient.[facility] HospitalIn the event this   
information is protected by the Federal Confidentiality of Alcohol and Drug 
Abuse   
Patient Records regulations: This information has been disclosed to you from 
records   
protected by Federal confidentiality rules (42 CFR Part 2). The Federal rules   
prohibit you from making any further disclosure of this information unless 
further   
disclosure is expressly permitted by the written consent of the person to whom 
it   
pertains or as otherwise permitted by 42 CFR Part 2. A general authorization for
 the   
release of medical or other information is NOT sufficient for this purpose. The   
Federal rules restrict any use of the information to criminally investigate or   
prosecute any alcohol or drug abuse patient.[facility] the event this   
information is protected by the Federal Confidentiality of Alcohol and Drug 
Abuse   
Patient Records regulations: This information has been disclosed to you from 
records   
protected by Federal confidentiality rules (42 CFR Part 2). The Federal rules   
prohibit you from making any further disclosure of this information unless 
further   
disclosure is expressly permitted by the written consent of the person to whom 
it   
pertains or as otherwise permitted by 42 CFR Part 2. A general authorization for
 the   
release of medical or other information is NOT sufficient for this purpose. The   
Federal rules restrict any use of the information to criminally investigate or   
prosecute any alcohol or drug abuse patient.[facility] HospitalIn the event this   
information is protected by the Federal Confidentiality of Alcohol and Drug 
Abuse   
Patient Records regulations: This information has been disclosed to you from 
records   
protected by Federal confidentiality rules (42 CFR Part 2). The Federal rules   
prohibit you from making any further disclosure of this information unless 
further   
disclosure is expressly permitted by the written consent of the person to whom 
it   
pertains or as otherwise permitted by 42 CFR Part 2. A general authorization for
 the   
release of medical or other information is NOT sufficient for this purpose. The   
Federal rules restrict any use of the information to criminally investigate or   
prosecute any alcohol or drug abuse patient.[facility]In the event this   
information is protected by the Federal Confidentiality of Alcohol and Drug 
Abuse   
Patient Records regulations: This information has been disclosed to you from 
records   
protected by Federal confidentiality rules (42 CFR Part 2). The Federal rules   
prohibit you from making any further disclosure of this information unless 
further   
disclosure is expressly permitted by the written consent of the person to whom 
it   
pertains or as otherwise permitted by 42 CFR Part 2. A general authorization for
 the   
release of medical or other information is NOT sufficient for this purpose. The   
Federal rules restrict any use of the information to criminally investigate or   
prosecute any alcohol or drug abuse patient.[facility] HospitalIn the event this   
information is protected by the Federal Confidentiality of Alcohol and Drug 
Abuse   
Patient Records regulations: This information has been disclosed to you from 
records   
protected by Federal confidentiality rules (42 CFR Part 2). The Federal rules   
prohibit you from making any further disclosure of this information unless 
further   
disclosure is expressly permitted by the written consent of the person to whom 
it   
pertains or as otherwise permitted by 42 CFR Part 2. A general authorization for
 the   
release of medical or other information is NOT sufficient for this purpose. The   
Federal rules restrict any use of the information to criminally investigate or   
prosecute any alcohol or drug abuse patient.[facility] HospitalIn the event this   
information is protected by the Federal Confidentiality of Alcohol and Drug 
Abuse   
Patient Records regulations: This information has been disclosed to you from 
records   
protected by Federal confidentiality rules (42 CFR Part 2). The Federal rules   
prohibit you from making any further disclosure of this information unless 
further   
disclosure is expressly permitted by the written consent of the person to whom 
it   
pertains or as otherwise permitted by 42 CFR Part 2. A general authorization for
 the   
release of medical or other information is NOT sufficient for this purpose. The   
Federal rules restrict any use of the information to criminally investigate or   
prosecute any alcohol or drug abuse patient.[facility] HospitalIn the event this   
information is protected by the Federal Confidentiality of Alcohol and Drug 
Abuse   
Patient Records regulations: This information has been disclosed to you from 
records   
protected by Federal confidentiality rules (42 CFR Part 2). The Federal rules   
prohibit you from making any further disclosure of this information unless 
further   
disclosure is expressly permitted by the written consent of the person to whom 
it   
pertains or as otherwise permitted by 42 CFR Part 2. A general authorization for
 the   
release of medical or other information is NOT sufficient for this purpose. The   
Federal rules restrict any use of the information to criminally investigate or   
prosecute any alcohol or drug abuse patient.[facility] HospitalIn the event this   
information is protected by the Federal Confidentiality of Alcohol and Drug 
Abuse   
Patient Records regulations: This information has been disclosed to you from 
records   
protected by Federal confidentiality rules (42 CFR Part 2). The Federal rules   
prohibit you from making any further disclosure of this information unless 
further   
disclosure is expressly permitted by the written consent of the person to whom 
it   
pertains or as otherwise permitted by 42 CFR Part 2. A general authorization for
 the   
release of medical or other information is NOT sufficient for this purpose. The   
Federal rules restrict any use of the information to criminally investigate or   
prosecute any alcohol or drug abuse patient.[facility] HospitalIn the event this   
information is protected by the Federal Confidentiality of Alcohol and Drug 
Abuse   
Patient Records regulations: This information has been disclosed to you from 
records   
protected by Federal confidentiality rules (42 CFR Part 2). The Federal rules   
prohibit you from making any further disclosure of this information unless 
further   
disclosure is expressly permitted by the written consent of the person to whom 
it   
pertains or as otherwise permitted by 42 CFR Part 2. A general authorization for
 the   
release of medical or other information is NOT sufficient for this purpose. The   
Federal rules restrict any use of the information to criminally investigate or   
prosecute any alcohol or drug abuse patient.[facility] HospitalIn the event this   
information is protected by the Federal Confidentiality of Alcohol and Drug 
Abuse   
Patient Records regulations: This information has been disclosed to you from 
records   
protected by Federal confidentiality rules (42 CFR Part 2). The Federal rules   
prohibit you from making any further disclosure of this information unless 
further   
disclosure is expressly permitted by the written consent of the person to whom 
it   
pertains or as otherwise permitted by 42 CFR Part 2. A general authorization for
 the   
release of medical or other information is NOT sufficient for this purpose. The   
Federal rules restrict any use of the information to criminally investigate or   
prosecute any alcohol or drug abuse patient.[facility] HospitalIn the event this   
information is protected by the Federal Confidentiality of Alcohol and Drug 
Abuse   
Patient Records regulations: This information has been disclosed to you from 
records   
protected by Federal confidentiality rules (42 CFR Part 2). The Federal rules   
prohibit you from making any further disclosure of this information unless 
further   
disclosure is expressly permitted by the written consent of the person to whom 
it   
pertains or as otherwise permitted by 42 CFR Part 2. A general authorization for
 the   
release of medical or other information is NOT sufficient for this purpose. The   
Federal rules restrict any use of the information to criminally investigate or   
prosecute any alcohol or drug abuse patient.[facility] HospitalIn the event this   
information is protected by the Federal Confidentiality of Alcohol and Drug 
Abuse   
Patient Records regulations: This information has been disclosed to you from 
records   
protected by Federal confidentiality rules (42 CFR Part 2). The Federal rules   
prohibit you from making any further disclosure of this information unless 
further   
disclosure is expressly permitted by the written consent of the person to whom 
it   
pertains or as otherwise permitted by 42 CFR Part 2. A general authorization for
 the   
release of medical or other information is NOT sufficient for this purpose. The   
Federal rules restrict any use of the information to criminally investigate or   
prosecute any alcohol or drug abuse patient.[facility] HospitalIn the event this   
information is protected by the Federal Confidentiality of Alcohol and Drug 
Abuse   
Patient Records regulations: This information has been disclosed to you from 
records   
protected by Federal confidentiality rules (42 CFR Part 2). The Federal rules   
prohibit you from making any further disclosure of this information unless 
further   
disclosure is expressly permitted by the written consent of the person to whom 
it   
pertains or as otherwise permitted by 42 CFR Part 2. A general authorization for
 the   
release of medical or other information is NOT sufficient for this purpose. The   
Federal rules restrict any use of the information to criminally investigate or   
prosecute any alcohol or drug abuse patient.[facility] HospitalIn the event this   
information is protected by the Federal Confidentiality of Alcohol and Drug 
Abuse   
Patient Records regulations: This information has been disclosed to you from 
records   
protected by Federal confidentiality rules (42 CFR Part 2). The Federal rules   
prohibit you from making any further disclosure of this information unless 
further   
disclosure is expressly permitted by the written consent of the person to whom 
it   
pertains or as otherwise permitted by 42 CFR Part 2. A general authorization for
 the   
release of medical or other information is NOT sufficient for this purpose. The   
Federal rules restrict any use of the information to criminally investigate or   
prosecute any alcohol or drug abuse patient.[facility] HospitalIn the event this   
information is protected by the Federal Confidentiality of Alcohol and Drug 
Abuse   
Patient Records regulations: This information has been disclosed to you from 
records   
protected by Federal confidentiality rules (42 CFR Part 2). The Federal rules   
prohibit you from making any further disclosure of this information unless 
further   
disclosure is expressly permitted by the written consent of the person to whom 
it   
pertains or as otherwise permitted by 42 CFR Part 2. A general authorization for
 the   
release of medical or other information is NOT sufficient for this purpose. The   
Federal rules restrict any use of the information to criminally investigate or   
prosecute any alcohol or drug abuse patient.[facility] HospitalIn the event this   
information is protected by the Federal Confidentiality of Alcohol and Drug 
Abuse   
Patient Records regulations: This information has been disclosed to you from 
records   
protected by Federal confidentiality rules (42 CFR Part 2). The Federal rules   
prohibit you from making any further disclosure of this information unless 
further   
disclosure is expressly permitted by the written consent of the person to whom 
it   
pertains or as otherwise permitted by 42 CFR Part 2. A general authorization for
 the   
release of medical or other information is NOT sufficient for this purpose. The   
Federal rules restrict any use of the information to criminally investigate or   
prosecute any alcohol or drug abuse patient.[facility] HospitalIn the event this   
information is protected by the Federal Confidentiality of Alcohol and Drug 
Abuse   
Patient Records regulations: This information has been disclosed to you from 
records   
protected by Federal confidentiality rules (42 CFR Part 2). The Federal rules   
prohibit you from making any further disclosure of this information unless 
further   
disclosure is expressly permitted by the written consent of the person to whom 
it   
pertains or as otherwise permitted by 42 CFR Part 2. A general authorization for
 the   
release of medical or other information is NOT sufficient for this purpose. The   
Federal rules restrict any use of the information to criminally investigate or   
prosecute any alcohol or drug abuse patient.[facility] HospitalIn the event this   
information is protected by the Federal Confidentiality of Alcohol and Drug 
Abuse   
Patient Records regulations: This information has been disclosed to you from 
records   
protected by Federal confidentiality rules (42 CFR Part 2). The Federal rules   
prohibit you from making any further disclosure of this information unless 
further   
disclosure is expressly permitted by the written consent of the person to whom 
it   
pertains or as otherwise permitted by 42 CFR Part 2. A general authorization for
 the   
release of medical or other information is NOT sufficient for this purpose. The   
Federal rules restrict any use of the information to criminally investigate or   
prosecute any alcohol or drug abuse patient.[facility] HospitalIn the event this   
information is protected by the Federal Confidentiality of Alcohol and Drug 
Abuse   
Patient Records regulations: This information has been disclosed to you from 
records   
protected by Federal confidentiality rules (42 CFR Part 2). The Federal rules   
prohibit you from making any further disclosure of this information unless 
further   
disclosure is expressly permitted by the written consent of the person to whom 
it   
pertains or as otherwise permitted by 42 CFR Part 2. A general authorization for
 the   
release of medical or other information is NOT sufficient for this purpose. The   
Federal rules restrict any use of the information to criminally investigate or   
prosecute any alcohol or drug abuse patient.[facility] HospitalIn the event this   
information is protected by the Federal Confidentiality of Alcohol and Drug 
Abuse   
Patient Records regulations: This information has been disclosed to you from 
records   
protected by Federal confidentiality rules (42 CFR Part 2). The Federal rules   
prohibit you from making any further disclosure of this information unless 
further   
disclosure is expressly permitted by the written consent of the person to whom 
it   
pertains or as otherwise permitted by 42 CFR Part 2. A general authorization for
 the   
release of medical or other information is NOT sufficient for this purpose. The   
Federal rules restrict any use of the information to criminally investigate or   
prosecute any alcohol or drug abuse patient.[facility] HospitalIn the event this   
information is protected by the Federal Confidentiality of Alcohol and Drug 
Abuse   
Patient Records regulations: This information has been disclosed to you from 
records   
protected by Federal confidentiality rules (42 CFR Part 2). The Federal rules   
prohibit you from making any further disclosure of this information unless 
further   
disclosure is expressly permitted by the written consent of the person to whom 
it   
pertains or as otherwise permitted by 42 CFR Part 2. A general authorization for
 the   
release of medical or other information is NOT sufficient for this purpose. The   
Federal rules restrict any use of the information to criminally investigate or   
prosecute any alcohol or drug abuse patient.[facility] HospitalIn the event this   
information is protected by the Federal Confidentiality of Alcohol and Drug 
Abuse   
Patient Records regulations: This information has been disclosed to you from 
records   
protected by Federal confidentiality rules (42 CFR Part 2). The Federal rules   
prohibit you from making any further disclosure of this information unless 
further   
disclosure is expressly permitted by the written consent of the person to whom 
it   
pertains or as otherwise permitted by 42 CFR Part 2. A general authorization for
 the   
release of medical or other information is NOT sufficient for this purpose. The   
Federal rules restrict any use of the information to criminally investigate or   
prosecute any alcohol or drug abuse patient.[facility] HospitalIn the event this   
information is protected by the Federal Confidentiality of Alcohol and Drug 
Abuse   
Patient Records regulations: This information has been disclosed to you from 
records   
protected by Federal confidentiality rules (42 CFR Part 2). The Federal rules   
prohibit you from making any further disclosure of this information unless 
further   
disclosure is expressly permitted by the written consent of the person to whom 
it   
pertains or as otherwise permitted by 42 CFR Part 2. A general authorization for
 the   
release of medical or other information is NOT sufficient for this purpose. The   
Federal rules restrict any use of the information to criminally investigate or   
prosecute any alcohol or drug abuse patient.[facility] HospitalIn the event this   
information is protected by the Federal Confidentiality of Alcohol and Drug 
Abuse   
Patient Records regulations: This information has been disclosed to you from 
records   
protected by Federal confidentiality rules (42 CFR Part 2). The Federal rules   
prohibit you from making any further disclosure of this information unless 
further   
disclosure is expressly permitted by the written consent of the person to whom 
it   
pertains or as otherwise permitted by 42 CFR Part 2. A general authorization for
 the   
release of medical or other information is NOT sufficient for this purpose. The   
Federal rules restrict any use of the information to criminally investigate or   
prosecute any alcohol or drug abuse patient.[facility] HospitalIn the event this   
information is protected by the Federal Confidentiality of Alcohol and Drug 
Abuse   
Patient Records regulations: This information has been disclosed to you from 
records   
protected by Federal confidentiality rules (42 CFR Part 2). The Federal rules   
prohibit you from making any further disclosure of this information unless 
further   
disclosure is expressly permitted by the written consent of the person to whom 
it   
pertains or as otherwise permitted by 42 CFR Part 2. A general authorization for
 the   
release of medical or other information is NOT sufficient for this purpose. The   
Federal rules restrict any use of the information to criminally investigate or   
prosecute any alcohol or drug abuse patient.[facility] HospitalIn the event this   
information is protected by the Federal Confidentiality of Alcohol and Drug 
Abuse   
Patient Records regulations: This information has been disclosed to you from 
records   
protected by Federal confidentiality rules (42 CFR Part 2). The Federal rules   
prohibit you from making any further disclosure of this information unless 
further   
disclosure is expressly permitted by the written consent of the person to whom 
it   
pertains or as otherwise permitted by 42 CFR Part 2. A general authorization for
 the   
release of medical or other information is NOT sufficient for this purpose. The   
Federal rules restrict any use of the information to criminally investigate or   
prosecute any alcohol or drug abuse patient.[facility] HospitalIn the event this   
information is protected by the Federal Confidentiality of Alcohol and Drug 
Abuse   
Patient Records regulations: This information has been disclosed to you from 
records   
protected by Federal confidentiality rules (42 CFR Part 2). The Federal rules   
prohibit you from making any further disclosure of this information unless 
further   
disclosure is expressly permitted by the written consent of the person to whom 
it   
pertains or as otherwise permitted by 42 CFR Part 2. A general authorization for
 the   
release of medical or other information is NOT sufficient for this purpose. The   
Federal rules restrict any use of the information to criminally investigate or   
prosecute any alcohol or drug abuse patient.[facility] HospitalIn the event this   
information is protected by the Federal Confidentiality of Alcohol and Drug 
Abuse   
Patient Records regulations: This information has been disclosed to you from 
records   
protected by Federal confidentiality rules (42 CFR Part 2). The Federal rules   
prohibit you from making any further disclosure of this information unless 
further   
disclosure is expressly permitted by the written consent of the person to whom 
it   
pertains or as otherwise permitted by 42 CFR Part 2. A general authorization for
 the   
release of medical or other information is NOT sufficient for this purpose. The   
Federal rules restrict any use of the information to criminally investigate or   
prosecute any alcohol or drug abuse patient.[facility] HospitalIn the event this   
information is protected by the Federal Confidentiality of Alcohol and Drug 
Abuse   
Patient Records regulations: This information has been disclosed to you from 
records   
protected by Federal confidentiality rules (42 CFR Part 2). The Federal rules   
prohibit you from making any further disclosure of this information unless 
further   
disclosure is expressly permitted by the written consent of the person to whom 
it   
pertains or as otherwise permitted by 42 CFR Part 2. A general authorization for
 the   
release of medical or other information is NOT sufficient for this purpose. The   
Federal rules restrict any use of the information to criminally investigate or   
prosecute any alcohol or drug abuse patient.[facility] HospitalIn the event this   
information is protected by the Federal Confidentiality of Alcohol and Drug 
Abuse   
Patient Records regulations: This information has been disclosed to you from 
records   
protected by Federal confidentiality rules (42 CFR Part 2). The Federal rules   
prohibit you from making any further disclosure of this information unless 
further   
disclosure is expressly permitted by the written consent of the person to whom 
it   
pertains or as otherwise permitted by 42 CFR Part 2. A general authorization for
 the   
release of medical or other information is NOT sufficient for this purpose. The   
Federal rules restrict any use of the information to criminally investigate or   
prosecute any alcohol or drug abuse patient.[facility] HospitalIn the event this   
information is protected by the Federal Confidentiality of Alcohol and Drug 
Abuse   
Patient Records regulations: This information has been disclosed to you from 
records   
protected by Federal confidentiality rules (42 CFR Part 2). The Federal rules   
prohibit you from making any further disclosure of this information unless 
further   
disclosure is expressly permitted by the written consent of the person to whom 
it   
pertains or as otherwise permitted by 42 CFR Part 2. A general authorization for
 the   
release of medical or other information is NOT sufficient for this purpose. The   
Federal rules restrict any use of the information to criminally investigate or   
prosecute any alcohol or drug abuse patient.[facility] HospitalIn the event this   
information is protected by the Federal Confidentiality of Alcohol and Drug 
Abuse   
Patient Records regulations: This information has been disclosed to you from 
records   
protected by Federal confidentiality rules (42 CFR Part 2). The Federal rules   
prohibit you from making any further disclosure of this information unless 
further   
disclosure is expressly permitted by the written consent of the person to whom 
it   
pertains or as otherwise permitted by 42 CFR Part 2. A general authorization for
 the   
release of medical or other information is NOT sufficient for this purpose. The   
Federal rules restrict any use of the information to criminally investigate or   
prosecute any alcohol or drug abuse patient.[facility]  
  
  
  
  
                                                    Reason for Visit (unrecogniz  
ed section and content)  
   
                                          
  
  
  
                                          
   
                                          
  
  
  
                                        Reason              Comments  
   
                                        Orders                
  
  
  
                                Reason          Onset Date      Comments  
   
                                Refill Request  2022        
  
  
  
                                        Reason              Comments  
   
                                        Patient Question      
  
  
  
                                        Reason              Comments  
   
                                        F/U 6 months          
  
  
  
                                        Reason              Comments  
   
                                        Results               
  
  
  
                                        Reason              Comments  
   
                                        Home Care           Confirmation Call  
  
  
  
                                        Reason              Comments  
   
                                        Patient Update        
  
  
  
                                        Reason              Comments  
   
                                        Home Care           PDGM  
  
  
  
                                        Reason              Comments  
   
                                        Home Care             
  
  
  
                                        Reason              Comments  
   
                                        Home Health calling for verbal order   
  
  
  
                                        Reason              Comments  
   
                                        Home Care           Fall without injury  
  
  
  
                                        Reason              Comments  
   
                                        Consult               
   
                                        Orders                
  
  
  
                                        Reason              Comments  
   
                                        New Patient           
  
  
  
                                        Reason              Comments  
   
                                        OT EVAL               
  
  
  
                          Specialty    Diagnoses / Procedures Referred By Contac  
t Referred To Contact  
   
                                                    [facility]                               
  
  
Diagnoses  
  
  
Peripheral   
polyneuropathy  
  
  
Abnormality of gait  
  
  
  
Procedures  
  
  
CONSULT TO OCCUPATIONAL   
THER  
  
  
OCCUPATIONAL THERAPY   
EVAL HIGH COMPLEX 60   
MINS                                      
  
  
Júnior Ahuja MD  
  
  
[address] [place] [address]  
  
  
Phone: [phone]  
  
  
Fax: [phone]                         
  
  
[facility]  
  
  
[address] [place] [address]  
  
  
  
                          Referral ID  Status       Reason       Start   
Date                                    Expiration   
Date                                    Visits   
Requested                               Visits   
Authorized  
   
                                [id number]        Authorized        
  
  
PCP Requested   
Referral  
  
  
Auto-Generate  
d Referral                              10/17/202  
2                   10/17/2023          99                  99  
  
  
  
                                        Reason              Comments  
   
                                        Forms                 
  
  
  
                                        Reason              Comments  
   
                                        New Patient Evaluation   
  
  
  
                                        Reason              Comments  
   
                                        Cirrhosis             
  
  
  
                          Specialty    Diagnoses / Procedures Referred By Contac  
t Referred To Contact  
   
                                                              
  
  
Diagnoses  
  
  
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   
ascites, unspecified hepatic   
cirrhosis type (HCC)  
  
  
Splenomegaly  
  
  
  
Procedures  
  
  
CONSULT TO HEPATOLOGY  
  
  
OFFICE/OUTPATIENT [facility] 60-74 MINUTES                         
  
  
Tello Ortega DO  
  
  
[address] [place] [address]  
  
  
Phone: [phone]  
  
  
Fax: [phone]                         
  
  
  
  
  
                    Referral ID Status    Reason    Start Date Expiration Date V  
isits   
Requested                               Visits   
Authorized  
   
                                [id number]        Closed            
  
  
PCP Requested   
Referral        2023        1               1  
  
  
  
                                        Reason              Comments  
   
                                        Rash                  
  
  
  
                                        Reason              Comments  
   
                                        Non-Chemotherapy Treatment   
  
  
  
                          Specialty    Diagnoses / Procedures Referred By Contac  
t Referred To Contact  
   
                                                              
  
  
Diagnoses  
  
  
Iron deficiency anemia due to   
chronic blood loss  
  
  
Iron malabsorption  
                                          
  
  
Tello Ortega, DO  
  
  
[address]  
  
  
[place] [address]  
  
  
Phone: [phone]  
  
  
Fax: [phone]                         
  
  
[facility]  
  
  
[address]  
  
  
[place] [address]  
  
  
Phone: [phone]  
  
  
Fax: [phone]  
  
  
  
                    Referral ID Status    Reason    Start Date Expiration Date V  
isits   
Requested                               Visits   
Authorized  
   
                [id number] Authorized         2023 99      99  
  
  
  
                                        Reason              Comments  
   
                                        Consult             colonoscopy  
  
  
  
                                        Reason              Comments  
   
                                        Appointment           
  
  
  
                                        Reason              Comments  
   
                                        Patient Update        
   
                                        Patient Question      
  
  
  
                                        Reason              Comments  
   
                                        Follow Up             
  
  
  
                                Reason          Onset Date      Comments  
   
                                Refill Request  2023        
  
  
  
                                        Reason              Comments  
   
                                        sores on lower legs X 2 weeks  
  
  
  
                                        Reason              Comments  
   
                                        Consult               
  
  
  
                                        Reason              Comments  
   
                                        [facility] follow-up   
  
  
  
                                        Reason              Comments  
   
                                        Radiology Pre Procedure Instructions   
  
  
  
                                        Reason              Comments  
   
                                        Radiology MRI       liver, under general  
 anesthesia  
  
  
  
                          Specialty    Diagnoses / Procedures Referred By Contac  
t Referred To Contact  
   
                                        MR IMAGING            
  
  
Diagnoses  
  
  
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   
ascites, unspecified hepatic   
cirrhosis type (HCC)  
  
  
Liver mass  
  
  
Splenomegaly  
  
  
  
Procedures  
  
  
MRI LIVER WO/W IVCON  
  
  
MRI ABDOMEN W/O & W/CONTRAST   
MATERIAL                                  
  
  
Maurizio Cochran PA-C  
  
  
[address] [place] [id number]  
  
  
Phone: [phone]  
  
  
Fax: [phone]                         
  
  
[facility]  
  
  
  
                    Referral ID Status    Reason    Start Date Expiration Date V  
isits   
Requested                               Visits   
Authorized  
   
                                [id number]        Closed            
  
  
Auto-Generate  
d Referral      2023       1               1  
  
  
  
                                        Reason              Comments  
   
                                        Radiology MRI         
  
  
  
                          Specialty    Diagnoses / Procedures Referred By Contac  
t Referred To Contact  
   
                                        MR IMAGING            
  
  
Diagnoses  
  
  
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   
ascites, unspecified hepatic   
cirrhosis type (HCC)  
  
  
Liver mass  
  
  
Splenomegaly  
  
  
  
Procedures  
  
  
MRI LIVER WO/W IVCON  
  
  
MRI ABDOMEN W/O & W/CONTRAST   
MATERIAL                                  
  
  
Maurizio Cochran PA-GEORGE  
  
  
[address] [place] [address]  
  
  
Phone: [phone]  
  
  
Fax: [phone]                         
  
  
[facility]  
  
  
  
                                        Reason              Comments  
   
                                        Established Patient 4cm hcc  
  
  
  
                                Reason          Onset Date      Comments  
   
                                SPP General - Treatment Referral 2023       
 [employer]  
   
                                Insurance Authorization 2023      [employer]aires  
  
  
  
                                Reason          Onset Date      Comments  
   
                                Simulation Request Form 2023        
  
  
  
                                        Reason              Comments  
   
                                        Patient Education     
  
  
  
                                Reason          Onset Date      Comments  
   
                                Refill Request  2023        
  
  
  
                                Reason          Onset Date      Comments  
   
                                Refill Request  2023        
  
  
  
                                        Reason              Comments  
   
                                        Care Coordinator - Other   
  
  
  
                                        Reason              Comments  
   
                                        OT home health order   
  
  
  
                                        Reason              Comments  
   
                                        [facility] Order Request     
  
  
  
                                        Reason              Comments  
   
                                        [facility]         
  
  
  
                                        Reason              Comments  
   
                                        Physical Therapy Plan of Care   
  
  
  
                                        Reason              Comments  
   
                                        Established Patient   
  
  
  
                                Reason          Onset Date      Comments  
   
                                Refill Request  2023        
  
  
  
                    Referral ID Status    Reason    Start Date Expiration Date V  
isits   
Requested                               Visits   
Authorized  
   
                [id number] Authorized         2023 99      99  
  
  
  
                                        Reason              Comments  
   
                                        Established Patient 3 month follow up  
  
  
  
                                        Reason              Comments  
   
                                        Radiology US          
  
  
  
                          Specialty    Diagnoses / Procedures Referred By Contac  
t Referred To Contact  
   
                                        US IMAG[facility]            
  
  
Diagnoses  
  
  
Monoclonal gammopathy  
  
  
Macrocytic anemia  
  
  
Thrombocytopenia (HCC)  
  
  
  
Procedures  
  
  
US ABD RT UPPER QUADRANT  
  
  
US ABDOMINAL REAL TIME   
W/IMAGE LIMITED                           
  
  
Tello Ortega, DO  
  
  
[address] [place] [address]  
  
  
Phone: [phone]  
  
  
Fax: [phone]                         
  
  
[facility]  
  
  
[place] [address]  
  
  
  
                    Referral ID Status    Reason    Start Date Expiration Date V  
isits   
Requested                               Visits   
Authorized  
   
                                [id number]        Closed            
  
  
Auto-Generate  
d Referral      2022        1               1  
  
  
  
                          Specialty    Diagnoses / Procedures Referred By Contac  
t Referred To Contact  
   
                                        MR IMAGING            
  
  
Diagnoses  
  
  
Liver disease  
  
  
  
Procedures  
  
  
MRI LIVER WO/W IVCON  
  
  
MRI ABDOMEN W/O &   
W/CONTRAST MATERIAL                       
  
  
Marin Fish MD  
  
  
[address] [place] [address]  
  
  
Phone: [phone]  
  
  
Fax: [phone]                         
  
  
[facility]  
  
  
[place] [address]  
  
  
  
                    Referral ID Status    Reason    Start Date Expiration Date V  
isits   
Requested                               Visits   
Authorized  
   
                                [id number]        Closed            
  
  
Auto-Generate  
d Referral      2023       1               1  
  
  
  
                                        Reason              Comments  
   
                                        Appointment Confirmation   
  
  
  
  
  
                                                    Care Teams (unrecognized sec  
tion and content)  
   
                                          
  
  
  
                                          
   
                                          
  
  
  
                      Team Member Relationship Specialty  Start Date End Date  
   
                                                      
  
  
Júnior Ahuja MD  
  
  
[address] [place], [place] [address] [phone] (Work)  
  
  
[phone] (Fax) PCP - General                   10/22/02          
  
  
  
                      Team Member Relationship Specialty  Start Date End Date  
   
                                                      
  
  
Júnior Ahuja MD  
  
  
[address] [place], [place] [id number] [phone] (Work)  
  
  
[phone] (Fax) PCP - General                   10/22/02          
  
  
  
                      Team Member Relationship Specialty  Start Date End Date  
   
                                                      
  
  
Júnior Ahuja MD  
  
  
[address] [place], [place] [address] [phone] (Work)  
  
  
[phone] (Fax) PCP - General                   10/22/02          
  
  
  
                      Team Member Relationship Specialty  Start Date End Date  
   
                                                      
  
  
Júnior Ahuja MD  
  
  
[address] [facility] [place] [address]  
  
  
[phone] (Work)  
  
  
[phone] (Fax) PCP - General                   10/22/02          
  
  
  
                      Team Member Relationship Specialty  Start Date End Date  
   
                                                      
  
  
Júnior Ahuja MD  
  
  
[address] [facility] [place] [address]  
  
  
[phone] (Work)  
  
  
[phone] (Fax) PCP - General                   10/22/02          
   
                                                      
  
  
Júnior Ahuja MD  
  
  
[address] [facility] [place] [address]  
  
  
[phone] (Work)  
  
  
[phone] (Fax) Referring       Internal Medicine 22           
   
                                                      
  
  
Júnior Ahuja MD  
  
  
[address] [facility] [place] [address]  
  
  
[phone] (Work)  
  
  
[phone] (Fax) Home Care Physician Internal Medicine 22           
  
  
  
                      Team Member Relationship Specialty  Start Date End Date  
   
                                                      
  
  
Júnior Ahuja MD  
  
  
[address] [facility] [place] [address]  
  
  
[phone] (Work)  
  
  
[phone] (Fax) PCP - General                   10/22/02          
   
                                                      
  
  
Júnior Ahuja MD  
  
  
[address] [facility] [place] [address]  
  
  
[phone] (Work)  
  
  
[phone] (Fax) Referring       Internal Medicine 22           
   
                                                      
  
  
Júnior Ahuja MD  
  
  
[address] [facility] [place] [address]  
  
  
[phone] (Work)  
  
  
[phone] (Fax) Home Care Physician Internal Medicine 22           
  
  
  
                      Team Member Relationship Specialty  Start Date End Date  
   
                                                      
  
  
Júnior Ahuja MD  
  
  
[address] [facility] [place] [address]  
  
  
[phone] (Work)  
  
  
[phone] (Fax) PCP - General                   10/22/02          
   
                                                      
  
  
Júnior Ahuja MD  
  
  
[address] [facility] [place] [address]  
  
  
[phone] (Work)  
  
  
[phone] (Fax) Referring       Internal Medicine 22           
   
                                                      
  
  
Júnior Ahuja MD  
  
  
[address] [facility] [place] [address]  
  
  
[phone] (Work)  
  
  
[phone] (Fax) Home Care Physician Internal Medicine 22           
  
  
  
                      Team Member Relationship Specialty  Start Date End Date  
   
                                                      
  
  
Júnior Ahuja MD  
  
  
[address] [facility] [place] [address]  
  
  
[phone] (Work)  
  
  
[phone] (Fax) PCP - General                   10/22/02          
   
                                                      
  
  
Júnior Ahuja MD  
  
  
[address] [facility] [place] [address]  
  
  
[phone] (Work)  
  
  
[phone] (Fax) Referring       Internal Medicine 22           
   
                                                      
  
  
Júnior Ahuja MD  
  
  
[address] [facility] [place] [address]  
  
  
[phone] (Work)  
  
  
[phone] (Fax) Home Care Physician Internal Medicine 22           
   
                                                      
  
  
Brenda Hernandez,   
PT  
  
  
[address] [place] [address]  
  
  
[phone] (Work) Home Care  Acute Care 22           
  
  
  
                      Team Member Relationship Specialty  Start Date End Date  
   
                                                      
  
  
Júnior Ahuja MD  
  
  
[address] [facility] [place] [address]  
  
  
[phone] (Work)  
  
  
[phone] (Fax) PCP - General                   10/22/02          
   
                                                      
  
  
Júnior Ahuja MD  
  
  
[address] [facility] [place] [address]  
  
  
[phone] (Work)  
  
  
[phone] (Fax) Referring       Internal Medicine 22           
   
                                                      
  
  
Júnior Ahuja MD  
  
  
[address] [facility] [place] [address]  
  
  
[phone] (Work)  
  
  
[phone] (Fax) Home Care Physician Internal Medicine 22           
   
                                                      
  
  
Brenda Hernandez,   
PT  
  
  
[address] [place] [address]  
  
  
[phone] (Work) Home Care  Acute Care 22           
  
  
  
                      Team Member Relationship Specialty  Start Date End Date  
   
                                                      
  
  
Júnior Ahuja MD  
  
  
[address] [facility] [place] [address]  
  
  
[phone] (Work)  
  
  
[phone] (Fax) PCP - General                   10/22/02          
   
                                                      
  
  
Júnior Ahuja MD  
  
  
[address] [facility] [place] [address]  
  
  
[phone] (Work)  
  
  
[phone] (Fax) Referring       Internal Medicine 22           
   
                                                      
  
  
Júnior Ahuja MD  
  
  
[address] [facility] [place] [address]  
  
  
[phone] (Work)  
  
  
[phone] (Fax) Home Care Physician Internal Medicine 22           
   
                                                      
  
  
Brenda Hernandez,   
PT  
  
  
[address] [place] [address]  
  
  
[phone] (Work) Home Care  Acute Care 22           
  
  
  
                      Team Member Relationship Specialty  Start Date End Date  
   
                                                      
  
  
Júnior Ahuja MD  
  
  
[address] [facility] [place] [address]  
  
  
[phone] (Work)  
  
  
[phone] (Fax) PCP - General                   10/22/02          
   
                                                      
  
  
Júnior Ahuja MD  
  
  
[address] [facility] [place] [address]  
  
  
[phone] (Work)  
  
  
[phone] (Fax) Referring       Internal Medicine 22           
   
                                                      
  
  
Júnior Ahuja MD  
  
  
[address] [facility] [place] [address]  
  
  
[phone] (Work)  
  
  
[phone] (Fax) Home Care Physician Internal Medicine 22           
   
                                                      
  
  
Brenda Hernandez,   
PT  
  
  
[address] [place] [address]  
  
  
[phone] (Work) Home Care  Acute Care 22           
  
  
  
                      Team Member Relationship Specialty  Start Date End Date  
   
                                                      
  
  
Júnior Ahuja MD  
  
  
[address] [facility] [place] [address]  
  
  
[phone] (Work)  
  
  
[phone] (Fax) PCP - General                   10/22/02          
   
                                                      
  
  
Júnior Ahuja MD  
  
  
[address] [facility] [place] [address]  
  
  
[phone] (Work)  
  
  
[phone] (Fax) Referring       Internal Medicine 22           
   
                                                      
  
  
Júnior Ahuja MD  
  
  
[address] [facility] [place] [address]  
  
  
[phone] (Work)  
  
  
[phone] (Fax) Home Care Physician Internal Medicine 22           
   
                                                      
  
  
Brenda Hernandez,   
PT  
  
  
[address] [facility] [place] [address]  
  
  
[phone] (Work) Home Care  Acute Care 22           
  
  
  
                      Team Member Relationship Specialty  Start Date End Date  
   
                                                      
  
  
Júnior Ahuja MD  
  
  
[address] [facility] [place] [address]  
  
  
[phone] (Work)  
  
  
[phone] (Fax) PCP - General                   10/22/02          
   
                                                      
  
  
Júnior Ahuja MD  
  
  
[address] [facility] [place] [address]  
  
  
[phone] (Work)  
  
  
[phone] (Fax) Referring       Internal Medicine 22           
   
                                                      
  
  
Júnior Ahuja MD  
  
  
[address] [facility] [place] [address]  
  
  
[phone] (Work)  
  
  
[phone] (Fax) Home Care Physician Internal Medicine 22           
   
                                                      
  
  
Brenda Hernandez,   
PT  
  
  
[address] [place] [address]  
  
  
[phone] (Work) Home Care  Acute Care 22           
  
  
  
                      Team Member Relationship Specialty  Start Date End Date  
   
                                                      
  
  
Júnior Ahuja MD  
  
  
[address] [facility] [place] [address]  
  
  
[phone] (Work)  
  
  
[phone] (Fax) PCP - General                   10/22/02          
   
                                                      
  
  
Júnior Ahuja MD  
  
  
[address] [facility] [place] [address]  
  
  
[phone] (Work)  
  
  
[phone] (Fax) Referring       Internal Medicine 22           
   
                                                      
  
  
Júnior Ahuja MD  
  
  
[address] [facility] [place] [address]  
  
  
[phone] (Work)  
  
  
[phone] (Fax) Home Care Provider Internal Medicine 22           
   
                                                      
  
  
Brenda Hernandez,   
PT  
  
  
[address] [place] [address]  
  
  
[phone] (Work) Home Care  Acute Care 22           
  
  
  
                      Team Member Relationship Specialty  Start Date End Date  
   
                                                      
  
  
Júnior Ahuja MD  
  
  
[address] [facility] [place] [address]  
  
  
[phone] (Work)  
  
  
[phone] (Fax) PCP - General                   10/22/02          
   
                                                      
  
  
Júnior Ahuja MD  
  
  
[address] [facility] [place] [address]  
  
  
[phone] (Work)  
  
  
[phone] (Fax) Referring       Internal Medicine 22           
   
                                                      
  
  
Júnior Ahuja MD  
  
  
[address] [facility] [place] [address]  
  
  
[phone] (Work)  
  
  
[phone] (Fax) Home Care Provider Internal Medicine 22           
   
                                                      
  
  
Brenda Hernandez,   
PT  
  
  
[address] [place] [address]  
  
  
[phone] (Work) Home Care  Acute Care 22           
  
  
  
                      Team Member Relationship Specialty  Start Date End Date  
   
                                                      
  
  
Júnior Ahuja MD  
  
  
[address] [facility] [place] [address]  
  
  
[phone] (Work)  
  
  
[phone] (Fax) PCP - General                   10/22/02          
   
                                                      
  
  
Júnior Ahuja MD  
  
  
[address] [facility] [place] [address]  
  
  
[phone] (Work)  
  
  
[phone] (Fax) Referring       Internal Medicine 22           
   
                                                      
  
  
Júnior Ahuja MD  
  
  
[address] [facility] [place] [address]  
  
  
[phone] (Work)  
  
  
[phone] (Fax) Home Care Provider Internal Medicine 22           
   
                                                      
  
  
Brenda Hernandez,   
PT  
  
  
[address] [place] [place] [address]  
  
  
[phone] (Work) Home Care  Acute Care 22           
  
  
  
                      Team Member Relationship Specialty  Start Date End Date  
   
                                                      
  
  
Júnior Ahuja MD  
  
  
[address] [facility] [place] [address]  
  
  
[phone] (Work)  
  
  
[phone] (Fax) PCP - General                   10/22/02          
   
                                                      
  
  
Júnior Ahuja MD  
  
  
[address] [facility] [place] [address]  
  
  
[phone] (Work)  
  
  
[phone] (Fax) Referring       Internal Medicine 22           
   
                                                      
  
  
Júnior Ahuja MD  
  
  
[address] [facility] [place] [address]  
  
  
[phone] (Work)  
  
  
[phone] (Fax) Home Care Provider Internal Medicine 22           
   
                                                      
  
  
Brenda Hernandez,   
PT  
  
  
[address] [place] [address]  
  
  
[phone] (Work) Home Care  Acute Care 22           
  
  
  
                      Team Member Relationship Specialty  Start Date End Date  
   
                                                      
  
  
Júnior Ahuja MD  
  
  
[address] [facility] [place] [address]  
  
  
[phone] (Work)  
  
  
[phone] (Fax) PCP - General                   10/22/02          
   
                                                      
  
  
Júnior Ahuja MD  
  
  
[address] [facility] [place] [address]  
  
  
[phone] (Work)  
  
  
[phone] (Fax) Referring       Internal Medicine 22           
   
                                                      
  
  
Júnior Ahuja MD  
  
  
[address] [facility] [place] [address]  
  
  
[phone] (Work)  
  
  
[phone] (Fax) Home Care Provider Internal Medicine 22           
   
                                                      
  
  
Brenda Hernandez,   
PT  
  
  
[address] [place] [address]  
  
  
[phone] (Work) Home Care  Acute Care 22           
  
  
  
                      Team Member Relationship Specialty  Start Date End Date  
   
                                                      
  
  
Júnior Ahuja MD  
  
  
[address] [facility] [place] [address]  
  
  
[phone] (Work)  
  
  
[phone] (Fax) PCP - General                   10/22/02          
   
                                                      
  
  
Júnior Ahuja MD  
  
  
[address] [facility] [place] [address]  
  
  
[phone] (Work)  
  
  
[phone] (Fax) Referring       Internal Medicine 22           
   
                                                      
  
  
Júnior Ahuja MD  
  
  
[address] [facility] [place] [address]  
  
  
[phone] (Work)  
  
  
[phone] (Fax) Home Care Provider Internal Medicine 22           
   
                                                      
  
  
Brenda Hernandez,   
PT  
  
  
[address] [place] [address]  
  
  
[phone] (Work) Home Care  Acute Care 22           
  
  
  
                      Team Member Relationship Specialty  Start Date End Date  
   
                                                      
  
  
Júnior Ahuja MD  
  
  
[address] [facility] [place] [address]  
  
  
[phone] (Work)  
  
  
[phone] (Fax) PCP - General                   10/22/02          
   
                                                      
  
  
Júnior Ahuja MD  
  
  
[address] [facility] [place] [address]  
  
  
[phone] (Work)  
  
  
[phone] (Fax) Referring       Internal Medicine 22           
   
                                                      
  
  
Júnior Ahuja MD  
  
  
[address] [facility] [place] [address]  
  
  
[phone] (Work)  
  
  
[phone] (Fax) Home Care Provider Internal Medicine 22           
   
                                                      
  
  
Brenda Hernandez,   
PT  
  
  
[address] [place] [address]  
  
  
[phone] (Work) Home Care  Acute Care 22           
  
  
  
                      Team Member Relationship Specialty  Start Date End Date  
   
                                                      
  
  
Júnior Ahuja MD  
  
  
[address] [facility] [place] [address]  
  
  
[phone] (Work)  
  
  
[phone] (Fax) PCP - General                   10/22/02          
   
                                                      
  
  
Júnior Ahuja MD  
  
  
[address] [facility] [place] [address]  
  
  
[phone] (Work)  
  
  
[phone] (Fax) Referring       Internal Medicine 22           
   
                                                      
  
  
Júnior Ahuja MD  
  
  
[address] [facility] [place] [address]  
  
  
[phone] (Work)  
  
  
[phone] (Fax) Home Care Provider Internal Medicine 22           
   
                                                      
  
  
Brenda Hernandez,   
PT  
  
  
[address] [place] [address]  
  
  
[phone] (Work) Home Care  Acute Care 22           
  
  
  
                      Team Member Relationship Specialty  Start Date End Date  
   
                                                      
  
  
Júnior Ahuja MD  
  
  
[address] [facility] [place] [address]  
  
  
[phone] (Work)  
  
  
[phone] (Fax) PCP - General                   10/22/02          
   
                                                      
  
  
Júnior Ahuja MD  
  
  
[address] [facility] [place] [address]  
  
  
[phone] (Work)  
  
  
[phone] (Fax) Referring       Internal Medicine 22           
   
                                                      
  
  
Júnior Ahuja MD  
  
  
[address] [facility] [place] [address]  
  
  
[phone] (Work)  
  
  
[phone] (Fax) Home Care Provider Internal Medicine 22           
   
                                                      
  
  
Brenda Hernandez,   
PT  
  
  
[address] [place] [address]  
  
  
[phone] (Work) Home Care  Acute Care 22           
  
  
  
                      Team Member Relationship Specialty  Start Date End Date  
   
                                                      
  
  
Júnior Ahuja MD  
  
  
[address] [facility] [place] [address]  
  
  
[phone] (Work)  
  
  
[phone] (Fax) PCP - General                   10/22/02          
   
                                                      
  
  
Júnior Ahuja MD  
  
  
[address] [facility] [place] [address]  
  
  
[phone] (Work)  
  
  
[phone] (Fax) Referring       Internal Medicine 22           
   
                                                      
  
  
Júnior Ahuja MD  
  
  
[address] [facility] [place] [address]  
  
  
[phone] (Work)  
  
  
[phone] (Fax) Home Care Provider Internal Medicine 22           
   
                                                      
  
  
Brenda Hernandez,   
PT  
  
  
[address] [place] [address]  
  
  
[phone] (Work) Home Care  Acute Care 22           
  
  
  
                      Team Member Relationship Specialty  Start Date End Date  
   
                                                      
  
  
Júnior Ahuja MD  
  
  
[address] [facility] [place] [address]  
  
  
[phone] (Work)  
  
  
[phone] (Fax) PCP - General                   10/22/02          
   
                                                      
  
  
Júnior Ahuja MD  
  
  
[address] [facility] [place] [address]  
  
  
[phone] (Work)  
  
  
[phone] (Fax) Referring       Internal Medicine 22           
   
                                                      
  
  
Júnior Ahuja MD  
  
  
[address] [facility] [place] [address]  
  
  
[phone] (Work)  
  
  
[phone] (Fax) Home Care Provider Internal Medicine 22           
   
                                                      
  
  
Brenda Hernandez,   
PT  
  
  
[address] [place] [address]  
  
  
[phone] (Work) Home Care  Acute Care 22           
  
  
  
                      Team Member Relationship Specialty  Start Date End Date  
   
                                                      
  
  
Júnior Ahuja MD  
  
  
[address] [facility] [place] [address]  
  
  
[phone] (Work)  
  
  
[phone] (Fax) PCP - General                   10/22/02          
   
                                                      
  
  
Júnior Ahuja MD  
  
  
[address] [facility] [place] [address]  
  
  
[phone] (Work)  
  
  
[phone] (Fax) Referring       Internal Medicine 22           
   
                                                      
  
  
Júnior Ahuja MD  
  
  
[address] [facility] [place] [address]  
  
  
[phone] (Work)  
  
  
[phone] (Fax) Home Care Provider Internal Medicine 22           
   
                                                      
  
  
Brenda Hernandez,   
PT  
  
  
[address] [place] [address]  
  
  
[phone] (Work) Home Care  Acute Care 22           
  
  
  
                      Team Member Relationship Specialty  Start Date End Date  
   
                                                      
  
  
Júnior Ahuja MD  
  
  
[address] [facility] [place] [address]  
  
  
[phone] (Work)  
  
  
[phone] (Fax) PCP - General                   10/22/02          
   
                                                      
  
  
Júnior Ahuja MD  
  
  
[address] [facility] [place] [address]  
  
  
[phone] (Work)  
  
  
[phone] (Fax) Referring       Internal Medicine 22           
   
                                                      
  
  
Júnior Ahuja MD  
  
  
[address] [facility] [place] [address]  
  
  
[phone] (Work)  
  
  
[phone] (Fax) Home Care Provider Internal Medicine 22           
   
                                                      
  
  
Brenda Hernandez,   
PT  
  
  
[address] [place] [address]  
  
  
[phone] (Work) Home Care  Acute Care 22           
  
  
  
                      Team Member Relationship Specialty  Start Date End Date  
   
                                                      
  
  
Júnior Ahuja MD  
  
  
[address] [facility] [place] [address]  
  
  
[phone] (Work)  
  
  
[phone] (Fax) PCP - General                   10/22/02          
   
                                                      
  
  
Júnior Ahuja MD  
  
  
[address] [facility] [place] [address]  
  
  
[phone] (Work)  
  
  
[phone] (Fax) Referring       Internal Medicine 22           
   
                                                      
  
  
Júnior Ahuja MD  
  
  
[address] [facility] [place] [address]  
  
  
[phone] (Work)  
  
  
[phone] (Fax) Home Care Provider Internal Medicine 22           
   
                                                      
  
  
Brenda Hernandez,   
PT  
  
  
[address] [place] [address]  
  
  
[phone] (Work) Home Care  Acute Care 22           
  
  
  
                      Team Member Relationship Specialty  Start Date End Date  
   
                                                      
  
  
Júnior Ahuja MD  
  
  
[address] [facility] [place] [address]  
  
  
[phone] (Work)  
  
  
[phone] (Fax) PCP - General                   10/22/02          
   
                                                      
  
  
Júnior Ahuja MD  
  
  
[address] [facility] [place] [address]  
  
  
[phone] (Work)  
  
  
[phone] (Fax) Referring       Internal Medicine 22           
   
                                                      
  
  
Júnior Ahuja MD  
  
  
[address] [facility] [place] [address]  
  
  
[phone] (Work)  
  
  
[phone] (Fax) Home Care Provider Internal Medicine 22           
   
                                                      
  
  
Brenda Hernandez,   
PT  
  
  
[address] [place] [address]  
  
  
[phone] (Work) Home Care  Acute Care 22           
  
  
  
                      Team Member Relationship Specialty  Start Date End Date  
   
                                                      
  
  
Júnior Ahuja MD  
  
  
[address] [facility] [place] [address]  
  
  
[phone] (Work)  
  
  
[phone] (Fax) PCP - General                   10/22/02          
   
                                                      
  
  
Júnior Ahuja MD  
  
  
[address] [facility] [place] [address]  
  
  
[phone] (Work)  
  
  
[phone] (Fax) Referring       Internal Medicine 22           
   
                                                      
  
  
Júnior Ahuja MD  
  
  
[address] [facility] [place] [address]  
  
  
[phone] (Work)  
  
  
[phone] (Fax) Home Care Provider Internal Medicine 22           
   
                                                      
  
  
Brenda Hernandez,   
PT  
  
  
[address] [place] [address]  
  
  
[phone] (Work) Home Care  Acute Care 22           
  
  
  
                      Team Member Relationship Specialty  Start Date End Date  
   
                                                      
  
  
Júnior Ahuja MD  
  
  
[address] [facility] [place] [address]  
  
  
[phone] (Work)  
  
  
[phone] (Fax) PCP - General                   10/22/02          
   
                                                      
  
  
Júnior Ahuja MD  
  
  
[address] [facility] [place] [address]  
  
  
[phone] (Work)  
  
  
[phone] (Fax) Referring       Internal Medicine 22           
   
                                                      
  
  
Júnior Ahuja MD  
  
  
[address] [facility] [place] [address]  
  
  
[phone] (Work)  
  
  
[phone] (Fax) Home Care Provider Internal Medicine 22           
   
                                                      
  
  
Brenda Hernandez,   
PT  
  
  
[address] [place] [address]  
  
  
[phone] (Work) Home Care  Acute Care 22           
  
  
  
                      Team Member Relationship Specialty  Start Date End Date  
   
                                                      
  
  
Júnior Ahuja MD  
  
  
[address] [facility] [place] [address]  
  
  
[phone] (Work)  
  
  
[phone] (Fax) PCP - General                   10/22/02          
   
                                                      
  
  
Júnior Ahuja MD  
  
  
[address] [facility] [place] [address]  
  
  
[phone] (Work)  
  
  
[phone] (Fax) Referring       Internal Medicine 22           
   
                                                      
  
  
Júnior Ahuja MD  
  
  
[address] [facility] [place] [address]  
  
  
[phone] (Work)  
  
  
[phone] (Fax) Home Care Provider Internal Medicine 22           
   
                                                      
  
  
Brenda Hernandez,   
PT  
  
  
[address] [place] [address]  
  
  
[phone] (Work) Home Care  Acute Care 22           
  
  
  
                      Team Member Relationship Specialty  Start Date End Date  
   
                                                      
  
  
Júnior Ahuja MD  
  
  
[address] [facility] [place] [address]  
  
  
[phone] (Work)  
  
  
[phone] (Fax) PCP - General                   10/22/02          
   
                                                      
  
  
Júnior Ahuja MD  
  
  
[address] [facility] [place] [address]  
  
  
[phone] (Work)  
  
  
[phone] (Fax) Referring       Internal Medicine 22           
   
                                                      
  
  
Júnior Ahuja MD  
  
  
[address] [facility] [place] [address]  
  
  
[phone] (Work)  
  
  
[phone] (Fax) Home Care Provider Internal Medicine 22           
   
                                                      
  
  
Brenda Hernandez,   
PT  
  
  
[address] [place] [address]  
  
  
[phone] (Work) Home Care  Acute Care 22           
  
  
  
                      Team Member Relationship Specialty  Start Date End Date  
   
                                                      
  
  
Júnior Ahuja MD  
  
  
[address] [facility] [place] [address]  
  
  
[phone] (Work)  
  
  
[phone] (Fax) PCP - General                   10/22/02          
   
                                                      
  
  
Júnior Ahuja MD  
  
  
[address] [facility] [place] [address]  
  
  
[phone] (Work)  
  
  
[phone] (Fax) Referring       Internal Medicine 22           
   
                                                      
  
  
Júnior Ahuja MD  
  
  
[address] [facility] [place] [address]  
  
  
[phone] (Work)  
  
  
[phone] (Fax) Home Care Provider Internal Medicine 22           
   
                                                      
  
  
Brenda Hernandez,   
PT  
  
  
[address] [place] [address]  
  
  
[phone] (Work) Home Care  Acute Care 22           
  
  
  
                      Team Member Relationship Specialty  Start Date End Date  
   
                                                      
  
  
Júnior Ahuja MD  
  
  
[address] [facility] [place] [address]  
  
  
[phone] (Work)  
  
  
[phone] (Fax) PCP - General                   10/22/02          
   
                                                      
  
  
Júnior Ahuja MD  
  
  
[address] [facility] [place] [address]  
  
  
[phone] (Work)  
  
  
[phone] (Fax) Referring       Internal Medicine 22           
   
                                                      
  
  
Júnior Ahuja MD  
  
  
[address] [facility] [place] [address]  
  
  
[phone] (Work)  
  
  
[phone] (Fax) Home Care Provider Internal Medicine 22           
   
                                                      
  
  
Brenda Hernandez,   
PT  
  
  
[address] [place] [address]  
  
  
[phone] (Work) Home Care  Acute Care 22           
  
  
  
                      Team Member Relationship Specialty  Start Date End Date  
   
                                                      
  
  
Júnior Ahuja MD  
  
  
[address] [facility] [place] [address]  
  
  
[phone] (Work)  
  
  
[phone] (Fax) PCP - General                   10/22/02          
   
                                                      
  
  
Júnior Ahuja MD  
  
  
[address] [facility] [place] [address]  
  
  
[phone] (Work)  
  
  
[phone] (Fax) Referring       Internal Medicine 22           
   
                                                      
  
  
Júnior Ahuja MD  
  
  
[address] [facility] [place] [address]  
  
  
[phone] (Work)  
  
  
[phone] (Fax) Home Care Provider Internal Medicine 22           
   
                                                      
  
  
Brenda Hernandez,   
PT  
  
  
[address] [place] [address]  
  
  
[phone] (Work) Home Care  Acute Care 22           
  
  
  
                      Team Member Relationship Specialty  Start Date End Date  
   
                                                      
  
  
Júnior Ahuja MD  
  
  
[address] [facility] [place] [address]  
  
  
[phone] (Work)  
  
  
[phone] (Fax) PCP - General                   10/22/02          
   
                                                      
  
  
Júnior Ahuja MD  
  
  
[address] [facility] [place] [address]  
  
  
[phone] (Work)  
  
  
[phone] (Fax) Referring       Internal Medicine 22           
   
                                                      
  
  
Júnior Ahuja MD  
  
  
[address] [facility] [place] [address]  
  
  
[phone] (Work)  
  
  
[phone] (Fax) Home Care Provider Internal Medicine 22           
   
                                                      
  
  
Brenda Hernandez,   
PT  
  
  
[address] [place] [address]  
  
  
[phone] (Work) Home Care  Acute Care 22           
  
  
  
                      Team Member Relationship Specialty  Start Date End Date  
   
                                                      
  
  
Júnior Ahuja MD  
  
  
[address] [facility] [place] [address]  
  
  
[phone] (Work)  
  
  
[phone] (Fax) PCP - General                   10/22/02          
   
                                                      
  
  
Júnior Ahuja MD  
  
  
[address] [facility] [place] [address]  
  
  
[phone] (Work)  
  
  
[phone] (Fax) Referring       Internal Medicine 22           
   
                                                      
  
  
Júnior Ahuja MD  
  
  
[address] [facility] [place] [address]  
  
  
[phone] (Work)  
  
  
[phone] (Fax) Home Care Provider Internal Medicine 22           
   
                                                      
  
  
Brenda Hernandez,   
PT  
  
  
[address] [place] [place] [address]  
  
  
[phone] (Work) Home Care  Acute Care 22           
   
                                                      
  
  
Maurizio Cochran PA-C  
  
  
[address]  
  
  
[place] [address]  
  
  
[phone] (Work)  
  
  
[phone] (Fax)                 Gastroenterology 23           
   
                                                      
  
  
Nathalia Bernal PA-C  
  
  
[address] [facility] [place] [address]  
  
  
[phone] (Work)  
  
  
[phone] (Fax)                 General Surgery 23           
  
  
  
                      Team Member Relationship Specialty  Start Date End Date  
   
                                                      
  
  
Júnior Ahuja MD  
  
  
[address] [facility] [place] [address]  
  
  
[phone] (Work)  
  
  
[phone] (Fax) PCP - General                   10/22/02          
   
                                                      
  
  
Júnior Ahuja MD  
  
  
[address] [facility] [place] [address]  
  
  
[phone] (Work)  
  
  
[phone] (Fax) Referring       Internal Medicine 22           
   
                                                      
  
  
Júnior Ahuja MD  
  
  
[address] [place] [address]  
  
  
[phone] (Work)  
  
  
[phone] (Fax) Home Care Provider Internal Medicine 22           
   
                                                      
  
  
Brenda Hernandez,   
PT  
  
  
[address] [place] [address]  
  
  
[phone] (Work) Home Care  Acute Care 22           
   
                                                      
  
  
Maurizio Cochran PA-C  
  
  
[address] [place] [address]  
  
  
[phone] (Work)  
  
  
[phone] (Fax)                 Gastroenterology 23           
   
                                                      
  
  
Nathalia Bernal PA-C  
  
  
[address] [facility].  
  
  
[place] [address]  
  
  
[phone] (Work)  
  
  
[phone] (Fax)                 General Surgery 23           
  
  
  
                      Team Member Relationship Specialty  Start Date End Date  
   
                                                      
  
  
Júnior Ahuja MD  
  
  
 [facility] [place] [address]  
  
  
[phone] (Work)  
  
  
[phone] (Fax) PCP - General                   10/22/02          
   
                                                      
  
  
Júnior Ahuja MD  
  
  
 [place] [address]  
  
  
[phone] (Work)  
  
  
[phone] (Fax) Referring       Internal Medicine 22           
   
                                                      
  
  
Júnior Ahuja MD  
  
  
[address] [place] [address]  
  
  
[phone] (Work)  
  
  
[phone] (Fax) Home Care Provider Internal Medicine 22           
   
                                                      
  
  
Brenda Hernandez,   
PT  
  
  
[address] [place] [address]  
  
  
[phone] (Work) Home Care  Acute Care 22           
   
                                                      
  
  
Maurizio Cochran PA-C  
  
  
[address] [place] [address]  
  
  
[phone] (Work)  
  
  
[phone] (Fax)                 Gastroenterology 23           
   
                                                      
  
  
Nathalia Bernal PA-C  
  
  
[address]  
  
  
[place] [address]  
  
  
[phone] (Work)  
  
  
[phone] (Fax)                 General Surgery 23           
  
  
  
                      Team Member Relationship Specialty  Start Date End Date  
   
                                                      
  
  
Júnior Ahuja MD  
  
  
[address] [facility] [place] [address]  
  
  
[phone] (Work)  
  
  
[phone] (Fax) PCP - General                   10/22/02          
   
                                                      
  
  
Júnior Ahuja MD  
  
  
[address] [place] [address]  
  
  
[phone] (Work)  
  
  
[phone] (Fax) Referring       Internal Medicine 22           
   
                                                      
  
  
Júnior Ahuja MD  
  
  
[address] [place] [address]  
  
  
[phone] (Work)  
  
  
[phone] (Fax) Home Care Provider Internal Medicine 22           
   
                                                      
  
  
Brenda Hernandez,   
PT  
  
  
[address] [place] [address]  
  
  
[phone] (Work) Home Care  Acute Care 22           
   
                                                      
  
  
Maurizio Cochran PA-C  
  
  
[address] [place] [address]  
  
  
[phone] (Work)  
  
  
[phone] (Fax)                 Gastroenterology 23           
   
                                                      
  
  
Nathalia Bernal PA-C  
  
  
[address]  
  
  
[place] [address]  
  
  
[phone] (Work)  
  
  
[phone] (Fax)                 General Surgery 23           
  
  
  
                      Team Member Relationship Specialty  Start Date End Date  
   
                                                      
  
  
Júnior Ahuja MD  
  
  
[address] [place] [address]  
  
  
[phone] (Work)  
  
  
[phone] (Fax) PCP - General                   10/22/02          
   
                                                      
  
  
Júnior Ahuja MD  
  
  
[address] [place] [address]  
  
  
[phone] (Work)  
  
  
[phone] (Fax) Referring       Internal Medicine 22           
   
                                                      
  
  
Júnior Ahuja MD  
  
  
[address] [place] [address]  
  
  
[phone] (Work)  
  
  
[phone] (Fax) Home Care Provider Internal Medicine 22           
   
                                                      
  
  
Brenda Hernandez,   
PT  
  
  
[address] [place] [address]  
  
  
[phone] (Work) Home Care  Acute Care 22           
   
                                                      
  
  
Maurizio Cochran PA-C  
  
  
[address] [place] [address]  
  
  
[phone] (Work)  
  
  
[phone] (Fax)                 Gastroenterology 23           
   
                                                      
  
  
Nathalia Bernal PA-C  
  
  
[address]  
  
  
[place] [address]  
  
  
[phone] (Work)  
  
  
[phone] (Fax)                 General Surgery 23           
  
  
  
                      Team Member Relationship Specialty  Start Date End Date  
   
                                                      
  
  
Júnior Ahuja MD  
  
  
[address] [place] [address]  
  
  
[phone] (Work)  
  
  
[phone] (Fax) PCP - General                   10/22/02          
   
                                                      
  
  
Júnior Ahuja MD  
  
  
[address] [place] [address]  
  
  
[phone] (Work)  
  
  
[phone] (Fax) Referring       Internal Medicine 22           
   
                                                      
  
  
Júnior Ahuja MD  
  
  
[address] [place] [address]  
  
  
[phone] (Work)  
  
  
[phone] (Fax) Home Care Provider Internal Medicine 22           
   
                                                      
  
  
Brenda Hernandez,   
PT  
  
  
[address] [place] [address]  
  
  
[phone] (Work) Home Care  Acute Care 22           
   
                                                      
  
  
Maurizio Cochran PA-C  
  
  
[address] [place] [address]  
  
  
[phone] (Work)  
  
  
[phone] (Fax)                 Gastroenterology 23           
   
                                                      
  
  
Nathalia Bernal PA-C  
  
  
[address]  
  
  
[place] [address]  
  
  
[phone] (Work)  
  
  
[phone] (Fax)                 General Surgery 23           
  
  
  
                      Team Member Relationship Specialty  Start Date End Date  
   
                                                      
  
  
Júnior Ahuja MD  
  
  
[address] [facility] [place] [address]  
  
  
[phone] (Work)  
  
  
[phone] (Fax) PCP - General                   10/22/02          
   
                                                      
  
  
Júnior Ahuja MD  
  
  
[address] [facility] [place] [address]  
  
  
[phone] (Work)  
  
  
[phone] (Fax) Referring       Internal Medicine 22           
   
                                                      
  
  
Júnior Ahuja MD  
  
  
[address] [facility] [place] [address]  
  
  
[phone] (Work)  
  
  
[phone] (Fax) Home Care Provider Internal Medicine 22           
   
                                                      
  
  
Brenda Hernandez,   
PT  
  
  
[address] [place] [address]  
  
  
[phone] (Work) Home Care  Acute Care 22           
   
                                                      
  
  
Maurizio Cochran PA-C  
  
  
[address] [place] [address]  
  
  
[phone] (Work)  
  
  
[phone] (Fax)                 Gastroenterology 23           
   
                                                      
  
  
Nathalia Bernal PA-C  
  
  
[address] [facility] [place] [address]  
  
  
[phone] (Work)  
  
  
[phone] (Fax)                 General Surgery 23           
  
  
  
                      Team Member Relationship Specialty  Start Date End Date  
   
                                                      
  
  
Júnior Ahuja MD  
  
  
[address] [facility] [place] [address]  
  
  
[phone] (Work)  
  
  
[phone] (Fax) PCP - General                   10/22/02          
   
                                                      
  
  
Júnior Ahuja MD  
  
  
[address] [facility] [place] [address]  
  
  
[phone] (Work)  
  
  
[phone] (Fax) Referring       Internal Medicine 22           
   
                                                      
  
  
Júnior Ahuja MD  
  
  
[address] [facility] [place] [address]  
  
  
[phone] (Work)  
  
  
[phone] (Fax) Home Care Provider Internal Medicine 22           
   
                                                      
  
  
Brenda Hernandez,   
PT  
  
  
[address] [place] [address]  
  
  
[phone] (Work) Home Care  Acute Care 22           
   
                                                      
  
  
Maurizio Cochran PA-C  
  
  
[address] [place] [address]  
  
  
[phone] (Work)  
  
  
[phone] (Fax)                 Gastroenterology 23           
   
                                                      
  
  
Nathalia Bernal PA-C  
  
  
[address]  
  
  
[place] [address]  
  
  
[phone] (Work)  
  
  
[phone] (Fax)                 General Surgery 23           
  
  
  
                      Team Member Relationship Specialty  Start Date End Date  
   
                                                      
  
  
Júnior Ahuja MD  
  
  
[address] [place] [address]  
  
  
[phone] (Work)  
  
  
[phone] (Fax) PCP - General                   10/22/02          
   
                                                      
  
  
Júnior Ahuja MD  
  
  
[address] [place] [address]  
  
  
[phone] (Work)  
  
  
[phone] (Fax) Referring       Internal Medicine 22           
   
                                                      
  
  
Júnior Ahuja MD  
  
  
[address] [place] [address]  
  
  
[phone] (Work)  
  
  
[phone] (Fax) Home Care Provider Internal Medicine 22           
   
                                                      
  
  
Brenda Hernandez,   
PT  
  
  
[address] [place] [address]  
  
  
[phone] (Work) Home Care  Acute Care 22           
   
                                                      
  
  
Maurizio Cochran PA-C  
  
  
[address] [place] [address]  
  
  
[phone] (Work)  
  
  
[phone] (Fax)                 Gastroenterology 23           
   
                                                      
  
  
Nathalia Bernal PA-C  
  
  
[address] [place] [address]  
  
  
[phone] (Work)  
  
  
[phone] (Fax)                 General Surgery 23           
  
  
  
                      Team Member Relationship Specialty  Start Date End Date  
   
                                                      
  
  
Júnior Ahuja MD  
  
  
[address] [place] [address]  
  
  
[phone] (Work)  
  
  
[phone] (Fax) PCP - General                   10/22/02          
   
                                                      
  
  
Júnior Ahuja MD  
  
  
[address] [place] [address]  
  
  
[phone] (Work)  
  
  
[phone] (Fax) Referring       Internal Medicine 22           
   
                                                      
  
  
Júnior Ahuja MD  
  
  
[address] [facility] [place] [address]  
  
  
[phone] (Work)  
  
  
[phone] (Fax) Home Care Provider Internal Medicine 22           
   
                                                      
  
  
Brenda Hernandez,   
PT  
  
  
[address] [place] [address]  
  
  
[phone] (Work) Home Care  Acute Care 22           
   
                                                      
  
  
Maurizio Cochran PA-C  
  
  
[address] [place] [address]  
  
  
[phone] (Work)  
  
  
[phone] (Fax)                 Gastroenterology 23           
   
                                                      
  
  
Nathalia Bernal PA-C  
  
  
[address]  
  
  
[place] [address]  
  
  
[phone] (Work)  
  
  
[phone] (Fax)                 General Surgery 23           
  
  
  
                      Team Member Relationship Specialty  Start Date End Date  
   
                                                      
  
  
Júnior Ahuja MD  
  
  
[address] [facility] [place] [address]  
  
  
[phone] (Work)  
  
  
[phone] (Fax) PCP - General                   10/22/02          
   
                                                      
  
  
Júnior Ahuja MD  
  
  
[address] [facility] [place] [address]  
  
  
[phone] (Work)  
  
  
[phone] (Fax) Referring       Internal Medicine 22           
   
                                                      
  
  
Júnior Ahuja MD  
  
  
[address] [facility] [place] [address]  
  
  
[phone] (Work)  
  
  
[phone] (Fax) Home Care Provider Internal Medicine 22           
   
                                                      
  
  
Brenda Hernandez,   
PT  
  
  
[address] [place] [address]  
  
  
[phone] (Work) Home Care  Acute Care 22           
   
                                                      
  
  
Maurizio Cochran PA-C  
  
  
[address] [place] [address]  
  
  
[phone] (Work)  
  
  
[phone] (Fax)                 Gastroenterology 23           
   
                                                      
  
  
Nathalia Bernal PA-C  
  
  
[address]  
  
  
[place] [address]  
  
  
[phone] (Work)  
  
  
[phone] (Fax)                 General Surgery 23           
  
  
  
                      Team Member Relationship Specialty  Start Date End Date  
   
                                                      
  
  
Júnior Ahuja MD  
  
  
[address] [facility] [place] [address]  
  
  
[phone] (Work)  
  
  
[phone] (Fax) PCP - General                   10/22/02          
   
                                                      
  
  
Júnior Ahuja MD  
  
  
[address] [place] [address]  
  
  
[phone] (Work)  
  
  
[phone] (Fax) Referring       Internal Medicine 22           
   
                                                      
  
  
Júnior Ahuja MD  
  
  
[address] [place] [address]  
  
  
[phone] (Work)  
  
  
[phone] (Fax) Home Care Provider Internal Medicine 22           
   
                                                      
  
  
Brenda Hernandez,   
PT  
  
  
[address] [place] [address]  
  
  
[phone] (Work) Home Care  Acute Care 22           
   
                                                      
  
  
Maurizio Cochran PA-C  
  
  
[address] [place] [address]  
  
  
[phone] (Work)  
  
  
[phone] (Fax)                 Gastroenterology 23           
   
                                                      
  
  
Nathalia Bernal PA-C  
  
  
[address] [place] [address]  
  
  
[phone] (Work)  
  
  
[phone] (Fax)                 General Surgery 23           
  
  
  
                      Team Member Relationship Specialty  Start Date End Date  
   
                                                      
  
  
Júnior Ahuja MD  
  
  
[address] [place] [address]  
  
  
[phone] (Work)  
  
  
[phone] (Fax) PCP - General                   10/22/02          
   
                                                      
  
  
Júnior Ahuja MD  
  
  
[address] [place] [address]  
  
  
[phone] (Work)  
  
  
[phone] (Fax) Referring       Internal Medicine 22           
   
                                                      
  
  
Júnior Ahuja MD  
  
  
[address] [place] [address]  
  
  
[phone] (Work)  
  
  
[phone] (Fax) Home Care Provider Internal Medicine 22           
   
                                                      
  
  
Brenda Hernandez,   
PT  
  
  
[address] [place] [address]  
  
  
[phone] (Work) Home Care  Acute Care 22           
   
                                                      
  
  
Maurizio Cochran PA-C  
  
  
[address] [place] [address]  
  
  
[phone] (Work)  
  
  
[phone] (Fax)                 Gastroenterology 23           
   
                                                      
  
  
Nathalia Bernal PA-C  
  
  
[address]  
  
  
[place] [address]  
  
  
[phone] (Work)  
  
  
[phone] (Fax)                 General Surgery 23           
  
  
  
                      Team Member Relationship Specialty  Start Date End Date  
   
                                                      
  
  
Júnior Ahuja MD  
  
  
[address] [facility] [place] [address]  
  
  
[phone] (Work)  
  
  
[phone] (Fax) PCP - General                   10/22/02          
   
                                                      
  
  
Júnior Ahuja MD  
  
  
[address] [facility] [place] [address]  
  
  
[phone] (Work)  
  
  
[phone] (Fax) Referring       Internal Medicine 22           
   
                                                      
  
  
Júnior Ahuja MD  
  
  
[address] [facility] [place] [address]  
  
  
[phone] (Work)  
  
  
[phone] (Fax) Home Care Provider Internal Medicine 22           
   
                                                      
  
  
Brenda Hernandez,   
PT  
  
  
[address] [place] [address]  
  
  
[phone] (Work) Home Care  Acute Care 22           
   
                                                      
  
  
Maurizio Cochran PA-C  
  
  
[address] [place] [address]  
  
  
[phone] (Work)  
  
  
[phone] (Fax)                 Gastroenterology 23           
   
                                                      
  
  
Nathalia Bernal PA-C  
  
  
[address]  
  
  
[place] [address]  
  
  
[phone] (Work)  
  
  
[phone] (Fax)                 General Surgery 23           
  
  
  
                      Team Member Relationship Specialty  Start Date End Date  
   
                                                      
  
  
Júnior Ahuja MD  
  
  
NPI: [id number]  
  
  
[address] [facility] [place] [address]  
  
  
[phone] (Work)  
  
  
[phone] (Fax) PCP - General                   10/22/02          
   
                                                      
  
  
Júnior Ahuja MD  
  
  
NPI: [id number]  
  
  
[address] [facility] [place] [address]  
  
  
[phone] (Work)  
  
  
[phone] (Fax) Referring       Internal Medicine 22           
   
                                                      
  
  
Júnior Ahuja MD  
  
  
NPI: [id number]  
  
  
[address] [place] [address]  
  
  
[phone] (Work)  
  
  
[phone] (Fax) Home Care Provider Internal Medicine 22           
   
                                                      
  
  
Brenda Hernandez,   
PT  
  
  
[address] [place] [address]  
  
  
[phone] (Work) Home Care  Acute Care 22           
   
                                                      
  
  
Maurizio Cochran PA-C  
  
  
NPI: [id number]  
  
  
[address] [place] [address]  
  
  
[phone] (Work)  
  
  
[phone] (Fax)                 Gastroenterology 23           
   
                                                      
  
  
Nathalia Bernal PA-C  
  
  
NPI: [id number]  
  
  
[address]  
  
  
[place] [address]  
  
  
[phone] (Work)  
  
  
[phone] (Fax)                 General Surgery 23           
  
  
  
                      Team Member Relationship Specialty  Start Date End Date  
   
                                                      
  
  
Júnior Ahuja MD  
  
  
NPI: [id number]  
  
  
[address] [place] [address]  
  
  
[phone] (Work)  
  
  
[phone] (Fax) PCP - General                   10/22/02          
   
                                                      
  
  
Júnior Ahuja MD  
  
  
NPI: [id number]  
  
  
[address] [place] [address]  
  
  
[phone] (Work)  
  
  
[phone] (Fax) Referring       Internal Medicine 22           
   
                                                      
  
  
Júnior Ahuja MD  
  
  
NPI: [id number]  
  
  
[address] [place] [address]  
  
  
[phone] (Work)  
  
  
[phone] (Fax) Home Care Provider Internal Medicine 22           
   
                                                      
  
  
Brenda Hernandez,   
PT  
  
  
[address] [place] [address]  
  
  
[phone] (Work) Home Care  Acute Care 22           
   
                                                      
  
  
Maurizio Cochran PA-C  
  
  
NPI: [id number]  
  
  
[address]  
  
  
[place] [address]  
  
  
[phone] (Work)  
  
  
[phone] (Fax)                 Gastroenterology 23           
   
                                                      
  
  
Nathalia Bernal PA-C  
  
  
NPI: [id number]  
  
  
[address] [place] [address]  
  
  
[phone] (Work)  
  
  
[phone] (Fax)                 General Surgery 23           
  
  
  
                      Team Member Relationship Specialty  Start Date End Date  
   
                                                      
  
  
Júnior Ahuja MD  
  
  
NPI: [id number]  
  
  
[address] [place] [address]  
  
  
[phone] (Work)  
  
  
[phone] (Fax) PCP - General                   10/22/02          
   
                                                      
  
  
Júnior Ahuja MD  
  
  
NPI: [id number]  
  
  
[address] [facility] [place] [address]  
  
  
[phone] (Work)  
  
  
[phone] (Fax) Referring       Internal Medicine 22           
   
                                                      
  
  
Júnior Ahuja MD  
  
  
NPI: [id number]  
  
  
[address] [place] [address]  
  
  
[phone] (Work)  
  
  
[phone] (Fax) Home Care Provider Internal Medicine 22           
   
                                                      
  
  
Brenda Hernandez,   
PT  
  
  
[address] [place] [address]  
  
  
[phone] (Work) Home Care  Acute Care 22           
   
                                                      
  
  
Maurizio Cochran PA-C  
  
  
NPI: [id number]  
  
  
[address] [place] [address]  
  
  
[phone] (Work)  
  
  
[phone] (Fax)                 Gastroenterology 23           
   
                                                      
  
  
Nathalia Bernal PA-C  
  
  
NPI: [id number]  
  
  
[address] [place] [address]  
  
  
[phone] (Work)  
  
  
[phone] (Fax)                 General Surgery 23           
  
  
  
                      Team Member Relationship Specialty  Start Date End Date  
   
                                                      
  
  
Júnior Ahuja MD  
  
  
NPI: [id number]  
  
  
[address] [place] [address]  
  
  
[phone] (Work)  
  
  
[phone] (Fax) PCP - General                   10/22/02          
   
                                                      
  
  
Júnior Ahuja MD  
  
  
NPI: [id number]  
  
  
[address] [place] [address]  
  
  
[phone] (Work)  
  
  
[phone] (Fax) Referring       Internal Medicine 22           
   
                                                      
  
  
Júnior Ahuja MD  
  
  
NPI: [id number]  
  
  
[address] [place] [address]  
  
  
[phone] (Work)  
  
  
[phone] (Fax) Home Care Provider Internal Medicine 22           
   
                                                      
  
  
Brenda Hernandez,   
PT  
  
  
[address] [place] [address]  
  
  
[phone] (Work) Home Care  Acute Care 22           
   
                                                      
  
  
Maurizio Cochran PA-C  
  
  
NPI: [id number]  
  
  
[address]  
  
  
[place] [address]  
  
  
[phone] (Work)  
  
  
[phone] (Fax)                 Gastroenterology 23           
   
                                                      
  
  
Nathalia Bernal PA-C  
  
  
NPI: [id number]  
  
  
[address] [place] [address]  
  
  
[phone] (Work)  
  
  
[phone] (Fax)                 General Surgery 23           
  
  
  
                      Team Member Relationship Specialty  Start Date End Date  
   
                                                      
  
  
Júnior Ahuja MD  
  
  
NPI: [id number]  
  
  
[address] [place] [address]  
  
  
[phone] (Work)  
  
  
[phone] (Fax) PCP - General                   10/22/02          
   
                                                      
  
  
Júnior Ahuja MD  
  
  
NPI: [id number]  
  
  
[address] [place] [address]  
  
  
[phone] (Work)  
  
  
[phone] (Fax) Referring       Internal Medicine 22           
   
                                                      
  
  
Júnior Ahuja MD  
  
  
NPI: [id number]  
  
  
[address] [place] [address]  
  
  
[phone] (Work)  
  
  
[phone] (Fax) Home Care Provider Internal Medicine 22           
   
                                                      
  
  
Maurizio Cochran PA-C  
  
  
NPI: [id number]  
  
  
[address]  
  
  
[place] [address]  
  
  
[phone] (Work)  
  
  
[phone] (Fax)                 Gastroenterology 23           
   
                                                      
  
  
Nathalia Bernal PA-C  
  
  
NPI: [id number]  
  
  
[address]  
  
  
[place] [address]  
  
  
[phone] (Work)  
  
  
[phone] (Fax)                 General Surgery 23           
  
  
  
                      Team Member Relationship Specialty  Start Date End Date  
   
                                                      
  
  
Ahuja, Jackie, MD  
  
  
NPI: [id number]  
  
  
[address] [facility]  
  
  
[place] [address]  
  
  
[phone] (Work)  
  
  
[phone] (Fax) PCP - General                   10/22/02          
   
                                                      
  
  
Júnior Ahuja MD  
  
  
NPI: [id number]  
  
  
[address] [place] [address]  
  
  
[phone] (Work)  
  
  
[phone] (Fax) Referring       Internal Medicine 22           
   
                                                      
  
  
Júnior Ahuja MD  
  
  
NPI: [id number]  
  
  
[address] [place] [address]  
  
  
[phone] (Work)  
  
  
[phone] (Fax) Home Care Provider Internal Medicine 22           
   
                                                      
  
  
Maurizio Cochran PA-C  
  
  
NPI: [id number]  
  
  
[address] [place], [place] [address] [phone] (Work)  
  
  
[phone] (Fax)                 Gastroenterology 23           
   
                                                      
  
  
Nathalia Bernal PA-C  
  
  
NPI: [id number]  
  
  
[address] [facility].  
  
  
[place] [address]  
  
  
[phone] (Work)  
  
  
[phone] (Fax)                 General Surgery 23           
  
  
  
                      Team Member Relationship Specialty  Start Date End Date  
   
                                                      
  
  
Júnior Ahuja MD  
  
  
NPI: [id number]  
  
  
[address] [place] [address]  
  
  
[phone] (Work)  
  
  
[phone] (Fax) PCP - General                   10/22/02          
   
                                                      
  
  
Júnior Ahuja MD  
  
  
NPI: [id number]  
  
  
[address] [facility]  
  
  
[place] [address]  
  
  
[phone] (Work)  
  
  
[phone] (Fax) Referring       Internal Medicine 22           
   
                                                      
  
  
Júnior Ahuja MD  
  
  
NPI: [id number]  
  
  
[address] [place] [address]  
  
  
[phone] (Work)  
  
  
[phone] (Fax) Home Care Provider Internal Medicine 22           
   
                                                      
  
  
Maurizio Cochran PA-C  
  
  
NPI: [id number]  
  
  
[address] [place] [address]  
  
  
[phone] (Work)  
  
  
[phone] (Fax)                 Gastroenterology 23           
   
                                                      
  
  
Nathalia eBrnal PA-C  
  
  
NPI: [id number]  
  
  
[address]  
  
  
[place], [place] [address] [phone] (Work)  
  
  
[phone] (Fax)                 General Surgery 23           
  
  
  
                      Team Member Relationship Specialty  Start Date End Date  
   
                                                      
  
  
Júnior Ahuja MD  
  
  
NPI: [id number]  
  
  
[address] [place], [place] [address] [phone] (Work)  
  
  
[phone] (Fax) PCP - General                   10/22/02          
   
                                                      
  
  
Júnior Ahuja MD  
  
  
NPI: [id number]  
  
  
[address] [facility] [place] [address] [phone] (Work)  
  
  
[phone] (Fax) Referring       Internal Medicine 22           
   
                                                      
  
  
Júnior Ahuja MD  
  
  
NPI: [id number]  
  
  
[address] [facility] [place] [address]  
  
  
[phone] (Work)  
  
  
[phone] (Fax) Home Care Provider Internal Medicine 22           
   
                                                      
  
  
Maurizio Cochran PA-C  
  
  
NPI: [id number]  
  
  
[address]  
  
  
[place] [address]  
  
  
[phone] (Work)  
  
  
[phone] (Fax)                 Gastroenterology 23           
   
                                                      
  
  
Nathalia Bernal PA-C  
  
  
NPI: [id number]  
  
  
[address]  
  
  
[place] [address]  
  
  
[phone] (Work)  
  
  
[phone] (Fax)                 General Surgery 23           
  
  
  
                      Team Member Relationship Specialty  Start Date End Date  
   
                                                      
  
  
Júnior Ahuja MD  
  
  
NPI: [id number]  
  
  
[address] [facility] [place] [address]  
  
  
[phone] (Work)  
  
  
[phone] (Fax) PCP - General                   10/22/02          
   
                                                      
  
  
Júnior Ahuja MD  
  
  
NPI: [id number]  
  
  
[address] [facility] [place] [address]  
  
  
[phone] (Work)  
  
  
[phone] (Fax) Referring       Internal Medicine 22           
   
                                                      
  
  
Júnior Ahuja MD  
  
  
NPI: [id number]  
  
  
[address] [facility]  
  
  
[place] [address]  
  
  
[phone] (Work)  
  
  
[phone] (Fax) Home Care Provider Internal Medicine 22           
   
                                                      
  
  
Maurizio Cochran PA-C  
  
  
NPI: [id number]  
  
  
[address]  
  
  
[place] [address]  
  
  
[phone] (Work)  
  
  
[phone] (Fax)                 Gastroenterology 23           
   
                                                      
  
  
Nathalia Bernal PA-C  
  
  
NPI: [id number]  
  
  
[address]  
  
  
[place] [address]  
  
  
[phone] (Work)  
  
  
[phone] (Fax)                 General Surgery 23           
  
  
  
                      Team Member Relationship Specialty  Start Date End Date  
   
                                                      
  
  
Júnior Ahuja MD  
  
  
NPI: [id number]  
  
  
[address] [facility] [place] [address]  
  
  
[phone] (Work)  
  
  
[phone] (Fax) PCP - General                   10/22/02          
   
                                                      
  
  
Júnior Ahuja MD  
  
  
NPI: [id number]  
  
  
[address] [place] [address]  
  
  
[phone] (Work)  
  
  
[phone] (Fax) Referring       Internal Medicine 22           
   
                                                      
  
  
Júnior Ahuja MD  
  
  
NPI: [id number]  
  
  
[address] [facility] [place] [address]  
  
  
[phone] (Work)  
  
  
[phone] (Fax) Home Care Provider Internal Medicine 22           
   
                                                      
  
  
Maurizio Cochran PA-C  
  
  
NPI: [id number]  
  
  
[address] [facility]  
  
  
[place] [address]  
  
  
[phone] (Work)  
  
  
[phone] (Fax)                 Gastroenterology 23           
   
                                                      
  
  
Nathalia Bernal PA-C  
  
  
NPI: [id number]  
  
  
[address]  
  
  
[place] [address]  
  
  
[phone] (Work)  
  
  
[phone] (Fax)                 General Surgery 23           
  
  
  
                      Team Member Relationship Specialty  Start Date End Date  
   
                                                      
  
  
Júnior Ahuja MD  
  
  
NPI: [id number]  
  
  
[address] [place] [address]  
  
  
[phone] (Work)  
  
  
[phone] (Fax) PCP - General                   10/22/02          
   
                                                      
  
  
Júnior Ahuja MD  
  
  
NPI: [id number]  
  
  
[address] [facility] [place] [address]  
  
  
[phone] (Work)  
  
  
[phone] (Fax) Referring       Internal Medicine 22           
   
                                                      
  
  
Júnior Ahuja MD  
  
  
NPI: [id number]  
  
  
[address] [facility]  
  
  
[place] [address]  
  
  
[phone] (Work)  
  
  
[phone] (Fax) Home Care Provider Internal Medicine 22           
   
                                                      
  
  
Maurizio Cochran PA-C  
  
  
NPI: [id number]  
  
  
[address] [facility] [place] [address] [phone] (Work)  
  
  
[phone] (Fax)                 Gastroenterology 23           
   
                                                      
  
  
Nathalia Bernal PA-C  
  
  
NPI: [id number]  
  
  
[address] [place] [address]  
  
  
[phone] (Work)  
  
  
[phone] (Fax)                 General Surgery 23           
  
  
  
                      Team Member Relationship Specialty  Start Date End Date  
   
                                                      
  
  
Júnior Ahuja MD  
  
  
NPI: [id number]  
  
  
[address] [facility] [place] [address]  
  
  
[phone] (Work)  
  
  
[phone] (Fax) PCP - General                   10/22/02          
   
                                                      
  
  
Júnior Ahuja MD  
  
  
NPI: [id number]  
  
  
[address] [facility] [place] [address]  
  
  
[phone] (Work)  
  
  
[phone] (Fax) Referring       Internal Medicine 22           
   
                                                      
  
  
Júnior Ahuja MD  
  
  
NPI: [id number]  
  
  
[address] [facility] [place] [address]  
  
  
[phone] (Work)  
  
  
[phone] (Fax) Home Care Provider Internal Medicine 22           
   
                                                      
  
  
Maurizio Cochran PA-C  
  
  
NPI: [id number]  
  
  
[address]  
  
  
[place] [address]  
  
  
[phone] (Work)  
  
  
[phone] (Fax)                 Gastroenterology 23           
   
                                                      
  
  
Nathalia Bernal PA-C  
  
  
NPI: [id number]  
  
  
[address] [place] [address]  
  
  
[phone] (Work)  
  
  
[phone] (Fax)                 General Surgery 23           
  
  
  
                      Team Member Relationship Specialty  Start Date End Date  
   
                                                      
  
  
Júnior Ahuja MD  
  
  
NPI: [id number]  
  
  
[address] [place], [place] [address] [phone] (Work)  
  
  
[phone] (Fax) PCP - General                   10/22/02          
   
                                                      
  
  
Júnior Ahuja MD  
  
  
NPI: [id number]  
  
  
[address] [place] [address]  
  
  
[phone] (Work)  
  
  
[phone] (Fax) Referring       Internal Medicine 22           
   
                                                      
  
  
Júnior Ahuja MD  
  
  
NPI: [id number]  
  
  
[address] [place] [address]  
  
  
[phone] (Work)  
  
  
[phone] (Fax) Home Care Provider Internal Medicine 22           
   
                                                      
  
  
Brenda Hernandez,   
PT  
  
  
[address] [place] [address]  
  
  
[phone] (Work) Home Care  Acute Care 22  
  
  
  
                      Team Member Relationship Specialty  Start Date End Date  
   
                                                      
  
  
Júnior Ahuja MD  
  
  
NPI: [id number]  
  
  
[address] [place] [address]  
  
  
[phone] (Work)  
  
  
[phone] (Fax) PCP - General                   10/22/02          
   
                                                      
  
  
Júnior Ahuja MD  
  
  
NPI: [id number]  
  
  
[address] [place], [place] [address] [phone] (Work)  
  
  
[phone] (Fax) Referring       Internal Medicine 22           
   
                                                      
  
  
Júnior Ahuja MD  
  
  
NPI: [id number]  
  
  
[address] [place], [place] [address] [phone] (Work)  
  
  
[phone] (Fax) Home Care Provider Internal Medicine 22           
   
                                                      
  
  
Maurizio Cochran PA-C  
  
  
NPI: [id number]  
  
  
[address] [facility] [place] [address]  
  
  
[phone] (Work)  
  
  
[phone] (Fax)                 Gastroenterology 23           
   
                                                      
  
  
Nathalia Bernal PA-C  
  
  
NPI: [id number]  
  
  
[address]  
  
  
[place], [place] [address] [phone] (Work)  
  
  
[phone] (Fax)                 General Surgery 23           
  
  
  
                      Team Member Relationship Specialty  Start Date End Date  
   
                                                      
  
  
Júnior Ahuja MD  
  
  
NPI: [id number]  
  
  
[address] [place], [place] [address] [phone] (Work)  
  
  
[phone] (Fax) PCP - General                   10/22/02          
   
                                                      
  
  
Júnior Ahuja MD  
  
  
NPI: [id number]  
  
  
[address] [place], [place] [address] [phone] (Work)  
  
  
[phone] (Fax) Referring       Internal Medicine 22           
   
                                                      
  
  
Júnior Ahuja MD  
  
  
NPI: [id number]  
  
  
[address] [place], [place] [address] [phone] (Work)  
  
  
[phone] (Fax) Home Care Provider Internal Medicine 22           
   
                                                      
  
  
Maurizio Cochran PA-C  
  
  
NPI: [id number]  
  
  
[address] [place] [address]  
  
  
[phone] (Work)  
  
  
[phone] (Fax)                 Gastroenterology 23           
   
                                                      
  
  
Nathalia Bernal PA-C  
  
  
NPI: [id number]  
  
  
[address]  
  
  
[place], [place] [address] [phone] (Work)  
  
  
[phone] (Fax)                 General Surgery 23           
  
  
FOR RECORDS PERTAINING TO PATIENTS WHO ARE OR HAVE BEEN ENROLLED IN A CHEMICAL 
DEPENDENCY/SUBSTANCEABUSE PROGRAM, SOME INFORMATION MAY BE OMITTED. This 
clinical summary was aggregated from multiple sources. Caution should be 
exercised in using it in the provision of clinical care. This summary normalizes
 information from multiple sources, and as a consequence, information in this 
document may materially change the coding, format and clinical context of 
patient data. In addition, data may be omitted in some cases. CLINICAL DECISIONS
 SHOULD BE BASED ON THE PRIMARY CLINICAL RECORDS. [employer] [facility]. provides
 no warranty or guarantee of the accuracy or completeness of information in this
 document.

## 2023-12-23 VITALS — DIASTOLIC BLOOD PRESSURE: 75 MMHG | SYSTOLIC BLOOD PRESSURE: 104 MMHG | HEART RATE: 98 BPM

## 2023-12-23 VITALS — RESPIRATION RATE: 18 BRPM | HEART RATE: 70 BPM | OXYGEN SATURATION: 94 %

## 2023-12-23 VITALS
DIASTOLIC BLOOD PRESSURE: 73 MMHG | SYSTOLIC BLOOD PRESSURE: 103 MMHG | HEART RATE: 97 BPM | TEMPERATURE: 97.88 F | OXYGEN SATURATION: 92 % | RESPIRATION RATE: 18 BRPM

## 2023-12-23 VITALS
RESPIRATION RATE: 16 BRPM | TEMPERATURE: 98.78 F | OXYGEN SATURATION: 94 % | DIASTOLIC BLOOD PRESSURE: 70 MMHG | SYSTOLIC BLOOD PRESSURE: 120 MMHG | HEART RATE: 79 BPM

## 2023-12-23 VITALS
RESPIRATION RATE: 14 BRPM | HEART RATE: 69 BPM | DIASTOLIC BLOOD PRESSURE: 69 MMHG | OXYGEN SATURATION: 94 % | TEMPERATURE: 97.16 F | SYSTOLIC BLOOD PRESSURE: 123 MMHG

## 2023-12-23 VITALS — HEART RATE: 59 BPM | RESPIRATION RATE: 24 BRPM

## 2023-12-23 VITALS
TEMPERATURE: 97.52 F | OXYGEN SATURATION: 93 % | HEART RATE: 98 BPM | SYSTOLIC BLOOD PRESSURE: 104 MMHG | DIASTOLIC BLOOD PRESSURE: 75 MMHG | RESPIRATION RATE: 20 BRPM

## 2023-12-23 VITALS — HEART RATE: 71 BPM | RESPIRATION RATE: 18 BRPM

## 2023-12-23 VITALS — HEART RATE: 79 BPM

## 2023-12-23 RX ADMIN — AZITHROMYCIN DIHYDRATE 250 MG: 500 INJECTION, POWDER, LYOPHILIZED, FOR SOLUTION INTRAVENOUS at 09:22

## 2023-12-23 RX ADMIN — Medication 50 MG: at 09:21

## 2023-12-23 RX ADMIN — ASPIRIN 81 MG: 81 TABLET, COATED ORAL at 09:21

## 2023-12-23 RX ADMIN — SODIUM CHLORIDE, PRESERVATIVE FREE 0 ML: 5 INJECTION INTRAVENOUS at 21:13

## 2023-12-23 RX ADMIN — Medication 128 MG: at 09:21

## 2023-12-23 RX ADMIN — ALBUTEROL SULFATE 2.5 MG: 2.5 SOLUTION RESPIRATORY (INHALATION) at 06:32

## 2023-12-23 RX ADMIN — Medication 125 MCG: at 09:20

## 2023-12-23 RX ADMIN — ALBUTEROL SULFATE 2.5 MG: 2.5 SOLUTION RESPIRATORY (INHALATION) at 18:41

## 2023-12-23 RX ADMIN — TOLTERODINE TARTRATE 4 MG: 4 CAPSULE, EXTENDED RELEASE ORAL at 09:21

## 2023-12-23 RX ADMIN — ALBUTEROL SULFATE 2.5 MG: 2.5 SOLUTION RESPIRATORY (INHALATION) at 13:14

## 2023-12-23 RX ADMIN — BUDESONIDE 0.5 MG: 0.5 SUSPENSION RESPIRATORY (INHALATION) at 18:41

## 2023-12-23 RX ADMIN — BUDESONIDE 0.5 MG: 0.5 SUSPENSION RESPIRATORY (INHALATION) at 06:32

## 2023-12-23 NOTE — PN.HOSP_ITS
Subjective    
Subjective    
Doing well, breathing a bit better.  Off of oxygen today    
    
Objective Data    
Objective Data    
Vital Signs:     
Vital Signs    
    
    
    
Temp Pulse Resp BP Pulse Ox O2 Del Method O2 Flow Rate    
     
 98.7 F   79   16   120/70   94   Room Air   0     
     
 12/23/23 09:10  12/23/23 09:20  12/23/23 09:10  12/23/23 09:10  12/23/23 09:10   
12/23/23 09:10  12/22/23 16:56    
    
    
    
    
Oxygen Flow Rate (L/min)         0                                                
       
Oxygen Delivery Method           Room Air                                         
       
Weight:                          266 lb 12.149 oz                                 
       
Body Mass Index (BMI)            38.2                                             
       
    
    
Intake & Output:     
                       Intake and Output for Last 24 Hours    
    
    
    
 12/22/23 12/23/23 12/24/23    
    
 03:59 03:59 03:59    
     
Intake Total 2050.58 / 2050.58 800 / 800 100 / 100    
     
Output Total  500 / 500 500 / 500    
     
Balance 2050.58 / 2050.58 300 / 300 -400 / -400    
    
    
    
Lab / Micro Data    
    
                                                        12/22/23 03:30              
    
                                                        12/22/23 03:30              
    
    
Physical Exam    
Narrative    
General: Alert, Oriented x3, Cooperative, No apparent distress    
HEENT: Atraumatic, PERRLA, EOMI, Normocephalic    
Oral: Moist Mucosa    
Neck: Supple, No JVD    
Lungs: Diminished, Normal air movement, No rhonchi, No wheeze, No rales    
Cardiovascular: Regular rate, Regular Rhythm, Normal S1, Normal S2, No murmurs    
Abdomen: Soft, Non Tender, Non-Distended, No Hepato-splenomegaly    
Extremities: No edema, Capillary Refill Less than 3 Seconds    
Skin: Chronic venous stasis changes with right lower extremity redness    
Musculoskeletal: No Tenderness to Palpation of Joints or Extremities    
Neurological: Cranial nerves II-XII grossly intact, Motor Exam 5/5 strength   
throughout, Sensory exam intact to light touch and pain    
Psych/Mental Status: Normal Affect, Appropriate    
    
Assessment & Plan    
Assessment/Plan    
(1) Paroxysmal A-fib:     
PLAN:     
Plan    
    
1.  Bibasilar community-acquired pneumonia with hypoxia with right lower ex  
tremity cellulitis    
? Continue with Rocephin and azithromycin    
? He did have an elevated D-dimer and CTA of the chest was negative for PE    
? He is SNF appropriate will consult case management for assistance in discharge  
planning    
    
2.  Paroxysmal A-fib with RVR/HTN/HLD/aortic stenosis status post replace  
ment/history of CVA/history of retinal artery occlusion    
?Continue with his home blood pressure medications    
? Continue with his home blood pressure medications, will monitor make   
adjustments as necessary    
? Continue with his home cholesterol medications    
    
3.  Anemia of chronic disease in setting of CKD 3    
? Does have a history of polycystic kidney disease    
? We will monitor renal function currently at baseline    
    
4.  CÁRDENAS with a history of cirrhosis and previous liver cancer/GERD    
? He does have stable ascites, will continue with his home medications    
? Continue with his PPI    
    
5.  Gout    
? Stable    
? Continue with his home allopurinol    
    
6.  Anxiety/depression    
? Stable    
? Continue with home medications    
    
DVT: Lovenox    
    
Charges/Coding    
Visit Charges    
Inpatient E&M: 39165 Subs Hosp L2

## 2023-12-23 NOTE — PCM.PN.HOSP
Subjective
Subjective
Doing well, breathing a bit better.  Off of oxygen today

Objective Data
Objective Data
Vital Signs: 
Vital Signs

Temp Pulse Resp BP Pulse Ox O2 Del Method O2 Flow Rate
 98.7 F   79   16   120/70   94   Room Air   0 
 12/23/23 09:10  12/23/23 09:20  12/23/23 09:10  12/23/23 09:10  12/23/23 09:10  12/23/23 09:10  12/22/23 16:56



Oxygen Flow Rate (L/min)       0                                                
Oxygen Delivery Method         Room Air                                         
Weight:                        266 lb 12.149 oz                                 
Body Mass Index (BMI)          38.2                                             


Intake & Output: 
Intake and Output for Last 24 Hours

 12/22/23 12/23/23 12/24/23
 03:59 03:59 03:59
Intake Total 2050.58 / 2050.58 800 / 800 100 / 100
Output Total  500 / 500 500 / 500
Balance 2050.58 / 2050.58 300 / 300 -400 / -400


Lab / Micro Data

12/22/23 03:30          

12/22/23 03:30          


Physical Exam
Narrative
General: Alert, Oriented x3, Cooperative, No apparent distress
HEENT: Atraumatic, PERRLA, EOMI, Normocephalic
Oral: Moist Mucosa
Neck: Supple, No JVD
Lungs: Diminished, Normal air movement, No rhonchi, No wheeze, No rales
Cardiovascular: Regular rate, Regular Rhythm, Normal S1, Normal S2, No murmurs
Abdomen: Soft, Non Tender, Non-Distended, No Hepato-splenomegaly
Extremities: No edema, Capillary Refill Less than 3 Seconds
Skin: Chronic venous stasis changes with right lower extremity redness
Musculoskeletal: No Tenderness to Palpation of Joints or Extremities
Neurological: Cranial nerves II-XII grossly intact, Motor Exam 5/5 strength throughout, Sensory exam intact to light touch and pain
Psych/Mental Status: Normal Affect, Appropriate

Assessment & Plan
Assessment/Plan
(1) Paroxysmal A-fib: 
PLAN: 
Plan

1.  Bibasilar community-acquired pneumonia with hypoxia with right lower extremity cellulitis
? Continue with Rocephin and azithromycin
? He did have an elevated D-dimer and CTA of the chest was negative for PE
? He is SNF appropriate will consult case management for assistance in discharge planning

2.  Paroxysmal A-fib with RVR/HTN/HLD/aortic stenosis status post replacement/history of CVA/history of retinal artery occlusion
?Continue with his home blood pressure medications
? Continue with his home blood pressure medications, will monitor make adjustments as necessary
? Continue with his home cholesterol medications

3.  Anemia of chronic disease in setting of CKD 3
? Does have a history of polycystic kidney disease
? We will monitor renal function currently at baseline

4.  CÁRDENAS with a history of cirrhosis and previous liver cancer/GERD
? He does have stable ascites, will continue with his home medications
? Continue with his PPI

5.  Gout
? Stable
? Continue with his home allopurinol

6.  Anxiety/depression
? Stable
? Continue with home medications

DVT: Lovenox

Charges/Coding
Visit Charges
Inpatient E&M: 97253 Subs Hosp L2

## 2023-12-24 VITALS — OXYGEN SATURATION: 95 %

## 2023-12-24 VITALS — OXYGEN SATURATION: 92 % | HEART RATE: 71 BPM | RESPIRATION RATE: 18 BRPM

## 2023-12-24 VITALS
SYSTOLIC BLOOD PRESSURE: 121 MMHG | DIASTOLIC BLOOD PRESSURE: 70 MMHG | RESPIRATION RATE: 18 BRPM | TEMPERATURE: 97.6 F | HEART RATE: 70 BPM | OXYGEN SATURATION: 96 %

## 2023-12-24 VITALS
SYSTOLIC BLOOD PRESSURE: 108 MMHG | RESPIRATION RATE: 14 BRPM | DIASTOLIC BLOOD PRESSURE: 90 MMHG | OXYGEN SATURATION: 100 % | TEMPERATURE: 97.52 F | HEART RATE: 110 BPM

## 2023-12-24 VITALS
HEART RATE: 70 BPM | RESPIRATION RATE: 18 BRPM | OXYGEN SATURATION: 94 % | TEMPERATURE: 98.6 F | DIASTOLIC BLOOD PRESSURE: 65 MMHG | SYSTOLIC BLOOD PRESSURE: 105 MMHG

## 2023-12-24 VITALS — HEART RATE: 70 BPM

## 2023-12-24 VITALS
DIASTOLIC BLOOD PRESSURE: 76 MMHG | SYSTOLIC BLOOD PRESSURE: 127 MMHG | OXYGEN SATURATION: 94 % | TEMPERATURE: 96.98 F | HEART RATE: 69 BPM | RESPIRATION RATE: 18 BRPM

## 2023-12-24 VITALS — HEART RATE: 70 BPM | RESPIRATION RATE: 18 BRPM

## 2023-12-24 VITALS — HEART RATE: 110 BPM | DIASTOLIC BLOOD PRESSURE: 90 MMHG | SYSTOLIC BLOOD PRESSURE: 108 MMHG

## 2023-12-24 VITALS — OXYGEN SATURATION: 98 %

## 2023-12-24 LAB
ANION GAP: 2 (ref 5–15)
BUN SERPL-MCNC: 28 MG/DL (ref 7–18)
BUN/CREAT RATIO: 32.2 RATIO (ref 10–20)
CALCIUM SERPL-MCNC: 10.2 MG/DL (ref 8.5–10.1)
CARBON DIOXIDE: 26 MMOL/L (ref 21–32)
CHLORIDE: 113 MMOL/L (ref 98–107)
DEPRECATED RDW RBC: 60.1 FL (ref 35.1–43.9)
DIFFERENTIAL INDICATED: (no result)
ERYTHROCYTE [DISTWIDTH] IN BLOOD: 15.3 % (ref 11.6–14.6)
EST GLOM FILT RATE - AFR AMER: 108 ML/MIN (ref 60–?)
ESTIMATED CREATININE CLEARANCE: 66.43 ML/MIN
GLUCOSE: 97 MG/DL (ref 74–106)
HCT VFR BLD AUTO: 34.1 % (ref 40–54)
HGB BLD-MCNC: 10.4 G/DL (ref 13–16.5)
IMMATURE GRANULOCYTES COUNT: 0.02 X10^3/UL (ref 0–0)
MANUAL DIF COMMENT BLD-IMP: (no result)
MCV RBC: 106.9 FL (ref 80–94)
MEAN CORP HGB CONC: 30.5 G/DL (ref 32–36)
MEAN PLATELET VOL.: 11.6 FL (ref 6.2–12)
NRBC FLAGGED BY ANALYZER: 0 % (ref 0–5)
PLATELET # BLD: 69 K/MM3 (ref 150–450)
POSITIVE COUNT: YES
POSITIVE DIFFERENTIAL: YES
POTASSIUM: 4 MMOL/L (ref 3.5–5.1)
RBC # BLD AUTO: 3.19 M/MM3 (ref 4.6–6.2)
SCAN SMEAR PER REVIEW CRITERIA: (no result)
WBC # BLD AUTO: 3.3 K/MM3 (ref 4.4–11)

## 2023-12-24 RX ADMIN — BUDESONIDE 0.5 MG: 0.5 SUSPENSION RESPIRATORY (INHALATION) at 19:49

## 2023-12-24 RX ADMIN — ALBUTEROL SULFATE 2.5 MG: 2.5 SOLUTION RESPIRATORY (INHALATION) at 07:04

## 2023-12-24 RX ADMIN — Medication 50 MG: at 08:55

## 2023-12-24 RX ADMIN — TOLTERODINE TARTRATE 4 MG: 4 CAPSULE, EXTENDED RELEASE ORAL at 08:55

## 2023-12-24 RX ADMIN — ALBUTEROL SULFATE 2.5 MG: 2.5 SOLUTION RESPIRATORY (INHALATION) at 19:49

## 2023-12-24 RX ADMIN — ASPIRIN 81 MG: 81 TABLET, COATED ORAL at 08:55

## 2023-12-24 RX ADMIN — BUDESONIDE 0.5 MG: 0.5 SUSPENSION RESPIRATORY (INHALATION) at 07:04

## 2023-12-24 RX ADMIN — AZITHROMYCIN DIHYDRATE 250 MG: 500 INJECTION, POWDER, LYOPHILIZED, FOR SOLUTION INTRAVENOUS at 09:24

## 2023-12-24 RX ADMIN — Medication 128 MG: at 08:55

## 2023-12-24 RX ADMIN — SODIUM CHLORIDE, PRESERVATIVE FREE 0 ML: 5 INJECTION INTRAVENOUS at 21:16

## 2023-12-24 RX ADMIN — Medication 125 MCG: at 08:54

## 2023-12-24 NOTE — PCM.PN.HOSP
Subjective
Subjective
Doing well, no issues overnight.  White count is resolved

Objective Data
Objective Data
Vital Signs: 
Vital Signs

Temp Pulse Resp BP Pulse Ox O2 Del Method O2 Flow Rate
 97.6 F L  70   18   121/70 H  96   Nasal Cannula   2 
 12/24/23 08:40  12/24/23 09:00  12/24/23 08:40  12/24/23 08:40  12/24/23 08:40  12/24/23 08:40  12/24/23 08:40



Oxygen Flow Rate (L/min)       2                                                
Oxygen Delivery Method         Nasal Cannula                                    
Weight:                        266 lb 12.149 oz                                 
Body Mass Index (BMI)          38.2                                             


Intake & Output: 
Intake and Output for Last 24 Hours

 12/23/23 12/24/23 12/25/23
 03:59 03:59 03:59
Intake Total 800 / 800 1245 / 1245 
Output Total 500 / 500 975 / 975 225 / 225
Balance 300 / 300 270 / 270 -225 / -225


Lab / Micro Data

12/24/23 05:40          

12/24/23 05:40          

Labs: 
Laboratory Results - last 24 hr

12/24/23 05:40: WBC 3.3 L, RBC 3.19 L, Hgb 10.4 L, Hct 34.1 L, .9 H, MCH 32.6 H, MCHC 30.5 L, RDW Std Deviation 60.1 H, RDW Coeff of Ilia 15.3 H, Plt Count 69 L, MPV 11.6, Immature Gran % (Auto) 0.600, Neut % (Auto) 82.1 H, Lymph % (Auto) 7.3 
L, Mono % (Auto) 6.4, Eos % (Auto) 3.0, Baso % (Auto) 0.6, Absolute Neuts (auto) 2.7, Absolute Lymphs (auto) 0.24 L, Nucleated RBC % 0, Differential Comment SCANNED, Sodium 141, Potassium 4.0, Chloride 113 H, Carbon Dioxide 26.0, Anion Gap 2 L, BUN 
28 H, Creatinine 0.87, Estim Creat Clear Calc 66.43, Est GFR (MDRD) Af Amer 108, Est GFR (MDRD) Non-Af 89, BUN/Creatinine Ratio 32.2 H, Glucose 97, Calcium 10.2 H



Physical Exam
Narrative
General: Alert, Oriented x3, Cooperative, No apparent distress
HEENT: Atraumatic, PERRLA, EOMI, Normocephalic
Oral: Moist Mucosa
Neck: Supple, No JVD
Lungs: Diminished, Normal air movement, No rhonchi, No wheeze, No rales
Cardiovascular: Regular rate, Regular Rhythm, Normal S1, Normal S2, No murmurs
Abdomen: Soft, Non Tender, Non-Distended, No Hepato-splenomegaly
Extremities: No edema, Capillary Refill Less than 3 Seconds
Skin: Chronic venous stasis changes with right lower extremity redness
Musculoskeletal: No Tenderness to Palpation of Joints or Extremities
Neurological: Cranial nerves II-XII grossly intact, Motor Exam 5/5 strength throughout, Sensory exam intact to light touch and pain
Psych/Mental Status: Normal Affect, Appropriate

Assessment & Plan
Assessment/Plan
(1) Paroxysmal A-fib: 
PLAN: 
Plan

1.  Bibasilar community-acquired pneumonia with hypoxia with right lower extremity cellulitis
? Continue with Rocephin, after today he will of completed his azithromycin course, his hypoxia has resolved
? He did have an elevated D-dimer and CTA of the chest was negative for PE
? He is SNF appropriate will consult case management for assistance in discharge planning

2.  Paroxysmal A-fib with RVR/HTN/HLD/aortic stenosis status post replacement/history of CVA/history of retinal artery occlusion
?Continue with his home blood pressure medications
? Continue with his home blood pressure medications, will monitor make adjustments as necessary
? Continue with his home cholesterol medications

3.  Anemia of chronic disease in setting of CKD 3
? Does have a history of polycystic kidney disease
? We will monitor renal function currently at baseline

4.  C[facility]RDENAS with a history of cirrhosis and previous liver cancer/GERD
? He does have stable ascites, will continue with his home medications
? Continue with his PPI

5.  Gout
? Stable
? Continue with his home allopurinol

6.  Anxiety/depression
? Stable
? Continue with home medications

DVT: Lovenox

Charges/Coding
Visit Charges
Inpatient E&M: 32540 Subs Hosp L2

## 2023-12-24 NOTE — PN.HOSP_ITS
Subjective    
Subjective    
Doing well, no issues overnight.  White count is resolved    
    
Objective Data    
Objective Data    
Vital Signs:     
Vital Signs    
    
    
    
Temp Pulse Resp BP Pulse Ox O2 Del Method O2 Flow Rate    
     
 97.6 F L  70   18   121/70 H  96   Nasal Cannula   2     
     
 12/24/23 08:40  12/24/23 09:00  12/24/23 08:40  12/24/23 08:40  12/24/23 08:40   
12/24/23 08:40  12/24/23 08:40    
    
    
    
    
Oxygen Flow Rate (L/min)         2                                                
       
Oxygen Delivery Method           Nasal Cannula                                    
       
Weight:                          266 lb 12.149 oz                                 
       
Body Mass Index (BMI)            38.2                                             
       
    
    
Intake & Output:     
                       Intake and Output for Last 24 Hours    
    
    
    
 12/23/23 12/24/23 12/25/23    
    
 03:59 03:59 03:59    
     
Intake Total 800 / 800 1245 / 1245     
     
Output Total 500 / 500 975 / 975 225 / 225    
     
Balance 300 / 300 270 / 270 -225 / -225    
    
    
    
Lab / Micro Data    
    
                                                        12/24/23 05:40              
    
                                                        12/24/23 05:40              
    
Labs:     
                         Laboratory Results - last 24 hr    
    
12/24/23 05:40: WBC 3.3 L, RBC 3.19 L, Hgb 10.4 L, Hct 34.1 L, .9 H, MCH   
32.6 H, MCHC 30.5 L, RDW Std Deviation 60.1 H, RDW Coeff of Ilia 15.3 H, Plt   
Count 69 L, MPV 11.6, Immature Gran % (Auto) 0.600, Neut % (Auto) 82.1 H, Lymph   
% (Auto) 7.3 L, Mono % (Auto) 6.4, Eos % (Auto) 3.0, Baso % (Auto) 0.6, Absolute  
Neuts (auto) 2.7, Absolute Lymphs (auto) 0.24 L, Nucleated RBC % 0, Differential  
Comment SCANNED, Sodium 141, Potassium 4.0, Chloride 113 H, Carbon Dioxide 26.0,  
Anion Gap 2 L, BUN 28 H, Creatinine 0.87, Estim Creat Clear Calc 66.43, Est GFR   
(MDRD) Af Amer 108, Est GFR (MDRD) Non-Af 89, BUN/Creatinine Ratio 32.2 H,   
Glucose 97, Calcium 10.2 H    
    
    
    
Physical Exam    
Narrative    
General: Alert, Oriented x3, Cooperative, No apparent distress    
HEENT: Atraumatic, PERRLA, EOMI, Normocephalic    
Oral: Moist Mucosa    
Neck: Supple, No JVD    
Lungs: Diminished, Normal air movement, No rhonchi, No wheeze, No rales    
Cardiovascular: Regular rate, Regular Rhythm, Normal S1, Normal S2, No murmurs    
Abdomen: Soft, Non Tender, Non-Distended, No Hepato-splenomegaly    
Extremities: No edema, Capillary Refill Less than 3 Seconds    
Skin: Chronic venous stasis changes with right lower extremity redness    
Musculoskeletal: No Tenderness to Palpation of Joints or Extremities    
Neurological: Cranial nerves II-XII grossly intact, Motor Exam 5/5 strength   
throughout, Sensory exam intact to light touch and pain    
Psych/Mental Status: Normal Affect, Appropriate    
    
Assessment & Plan    
Assessment/Plan    
(1) Paroxysmal A-fib:     
PLAN:     
Plan    
    
1.  Bibas[facility] community-acquired pneumonia with hypoxia with right lower   
extremity cellulitis    
? Continue with Rocephin, after today he will of completed his azithromycin   
course, his hypoxia has resolved    
? He did have an elevated D-dimer and CTA of the chest was negative for PE    
? He is SNF appropriate will consult case management for assistance in discharge  
planning    
    
2.  Paroxysmal A-fib with RVR/HTN/HLD/aortic stenosis status post   
replacement/history of CVA/history of retinal artery occlusion    
?Continue with his home blood pressure medications    
? Continue with his home blood pressure medications, will monitor make   
adjustments as necessary    
? Continue with his home cholesterol medications    
    
3.  Anemia of chronic disease in setting of CKD 3    
? Does have a history of polycystic kidney disease    
? We will monitor renal function currently at baseline    
    
4.  CÁRDENAS with a history of cirrhosis and previous liver cancer/GERD    
? He does have stable ascites, will continue with his home medications    
? Continue with his PPI    
    
5.  Gout    
? Stable    
? Continue with his home allopurinol    
    
6.  Anxiety/depression    
? Stable    
? Continue with home medications    
    
DVT: Lovenox    
    
Charges/Coding    
Visit Charges    
Inpatient E&M: 19374 Subs Hosp L2

## 2023-12-25 VITALS
OXYGEN SATURATION: 94 % | TEMPERATURE: 97.8 F | RESPIRATION RATE: 18 BRPM | SYSTOLIC BLOOD PRESSURE: 128 MMHG | DIASTOLIC BLOOD PRESSURE: 71 MMHG | HEART RATE: 82 BPM

## 2023-12-25 VITALS — RESPIRATION RATE: 20 BRPM | HEART RATE: 74 BPM | OXYGEN SATURATION: 93 %

## 2023-12-25 VITALS
DIASTOLIC BLOOD PRESSURE: 78 MMHG | OXYGEN SATURATION: 95 % | HEART RATE: 68 BPM | TEMPERATURE: 97.9 F | RESPIRATION RATE: 18 BRPM | SYSTOLIC BLOOD PRESSURE: 111 MMHG

## 2023-12-25 VITALS — HEART RATE: 72 BPM | RESPIRATION RATE: 16 BRPM

## 2023-12-25 VITALS
OXYGEN SATURATION: 93 % | HEART RATE: 75 BPM | DIASTOLIC BLOOD PRESSURE: 77 MMHG | RESPIRATION RATE: 18 BRPM | SYSTOLIC BLOOD PRESSURE: 120 MMHG | TEMPERATURE: 97.8 F

## 2023-12-25 VITALS
SYSTOLIC BLOOD PRESSURE: 111 MMHG | TEMPERATURE: 97.9 F | RESPIRATION RATE: 18 BRPM | HEART RATE: 69 BPM | OXYGEN SATURATION: 92 % | DIASTOLIC BLOOD PRESSURE: 70 MMHG

## 2023-12-25 VITALS — HEART RATE: 70 BPM | RESPIRATION RATE: 20 BRPM

## 2023-12-25 VITALS — HEART RATE: 94 BPM

## 2023-12-25 VITALS — HEART RATE: 69 BPM

## 2023-12-25 RX ADMIN — Medication 125 MCG: at 09:26

## 2023-12-25 RX ADMIN — ASPIRIN 81 MG: 81 TABLET, COATED ORAL at 09:26

## 2023-12-25 RX ADMIN — MICONAZOLE NITRATE 1 APPLIC: 20 POWDER TOPICAL at 20:57

## 2023-12-25 RX ADMIN — TOLTERODINE TARTRATE 4 MG: 4 CAPSULE, EXTENDED RELEASE ORAL at 09:27

## 2023-12-25 RX ADMIN — MICONAZOLE NITRATE 1 APPLIC: 20 POWDER TOPICAL at 09:27

## 2023-12-25 RX ADMIN — BUDESONIDE 0.5 MG: 0.5 SUSPENSION RESPIRATORY (INHALATION) at 07:00

## 2023-12-25 RX ADMIN — BUDESONIDE 0.5 MG: 0.5 SUSPENSION RESPIRATORY (INHALATION) at 18:59

## 2023-12-25 RX ADMIN — Medication 128 MG: at 09:26

## 2023-12-25 RX ADMIN — Medication 50 MG: at 09:26

## 2023-12-25 RX ADMIN — ALBUTEROL SULFATE 2.5 MG: 2.5 SOLUTION RESPIRATORY (INHALATION) at 18:59

## 2023-12-25 RX ADMIN — ALBUTEROL SULFATE 2.5 MG: 2.5 SOLUTION RESPIRATORY (INHALATION) at 13:11

## 2023-12-25 RX ADMIN — ALBUTEROL SULFATE 2.5 MG: 2.5 SOLUTION RESPIRATORY (INHALATION) at 07:00

## 2023-12-25 NOTE — PN.HOSP_ITS
Subjective    
Subjective    
Doing well, no issues overnight    
    
Objective Data    
Objective Data    
Vital Signs:     
Vital Signs    
    
    
    
Temp Pulse Resp BP Pulse Ox O2 Del Method O2 Flow Rate    
     
 97.9 F   69   18   111/70   92   Bi-pap   2     
     
 12/25/23 08:17  12/25/23 08:17  12/25/23 08:17  12/25/23 08:17  12/25/23 08:17   
12/25/23 08:29  12/24/23 20:18    
    
    
    
    
Oxygen Flow Rate (L/min)         2                                                
       
Oxygen Delivery Method           Bi-pap                                           
       
Weight:                          266 lb 12.149 oz                                 
       
Body Mass Index (BMI)            38.2                                             
       
    
    
Intake & Output:     
                       Intake and Output for Last 24 Hours    
    
    
    
 12/24/23 12/25/23 12/26/23    
    
 03:59 03:59 03:59    
     
Intake Total 1245 / 1245 1245 / 1245 100 / 100    
     
Output Total 975 / 975 1425 / 1425 300 / 300    
     
Balance 270 / 270 -180 / -180 -200 / -200    
    
    
    
Lab / Micro Data    
    
                                                        12/24/23 05:40              
    
                                                        12/24/23 05:40              
    
Labs:     
                         Laboratory Results - last 24 hr    
    
12/24/23 05:40: Differential Comment SCANNED    
    
    
    
Physical Exam    
Narrative    
General: Alert, Oriented x3, Cooperative, No apparent distress    
HEENT: Atraumatic, PERRLA, EOMI, Normocephalic    
Oral: Moist Mucosa    
Neck: Supple, No JVD    
Lungs: Diminished, Normal air movement, No rhonchi, No wheeze, No rales    
Cardiovascular: Regular rate, Regular Rhythm, Normal S1, Normal S2, No murmurs    
Abdomen: Soft, Non Tender, Non-Distended, No Hepato-splenomegaly    
Extremities: No edema, Capillary Refill Less than 3 Seconds    
Skin: Chronic venous stasis changes with right lower extremity redness    
Musculoskeletal: No Tenderness to Palpation of Joints or Extremities    
Neurological: Cranial nerves II-XII grossly intact, Motor Exam 5/5 strength   
throughout, Sensory exam intact to light touch and pain    
Psych/Mental Status: Normal Affect, Appropriate    
    
Assessment & Plan    
Assessment/Plan    
(1) Paroxysmal A-fib:     
PLAN:     
Plan    
    
1.  Bibasilar community-acquired pneumonia with hypoxia with right lower   
extremity cellulitis    
? Continue with Rocephin, his hypoxia has resolved    
? He did have an elevated D-dimer and CTA of the chest was negative for PE    
? He is SNF appropriate will consult case management for assistance in discharge  
planning    
    
2.  Paroxysmal A-fib with RVR/HTN/HLD/aortic stenosis status post   
replacement/history of CVA/history of retinal artery occlusion    
?Continue with his home blood pressure medications    
? Continue with his home blood pressure medications, will monitor make   
adjustments as necessary    
? Continue with his home cholesterol medications    
    
3.  Anemia of chronic disease in setting of CKD 3    
? Does have a history of polycystic kidney disease    
? We will monitor renal function currently at baseline    
    
4.  C[facility]RDENAS with a history of cirrhosis and previous liver cancer/GERD    
? He does have stable ascites, will continue with his home medications    
? Continue with his PPI    
    
5.  Gout    
? Stable    
? Continue with his home allopurinol    
    
6.  Anxiety/depression    
? Stable    
? Continue with home medications    
    
DVT: Lovenox    
    
Charges/Coding    
Visit Charges    
Inpatient E&M: 59666 Subs Hosp L2

## 2023-12-25 NOTE — PCM.PN.HOSP
Subjective
Subjective
Doing well, no issues overnight

Objective Data
Objective Data
Vital Signs: 
Vital Signs

Temp Pulse Resp BP Pulse Ox O2 Del Method O2 Flow Rate
 97.9 F   69   18   111/70   92   Bi-pap   2 
 12/25/23 08:17  12/25/23 08:17  12/25/23 08:17  12/25/23 08:17  12/25/23 08:17  12/25/23 08:29  12/24/23 20:18



Oxygen Flow Rate (L/min)       2                                                
Oxygen Delivery Method         Bi-pap                                           
Weight:                        266 lb 12.149 oz                                 
Body Mass Index (BMI)          38.2                                             


Intake & Output: 
Intake and Output for Last 24 Hours

 12/24/23 12/25/23 12/26/23
 03:59 03:59 03:59
Intake Total 1245 / 1245 1245 / 1245 100 / 100
Output Total 975 / 975 1425 / 1425 300 / 300
Balance 270 / 270 -180 / -180 -200 / -200


Lab / Micro Data

12/24/23 05:40          

12/24/23 05:40          

Labs: 
Laboratory Results - last 24 hr

12/24/23 05:40: Differential Comment SCANNED



Physical Exam
Narrative
General: Alert, Oriented x3, Cooperative, No apparent distress
HEENT: Atraumatic, PERRLA, EOMI, Normocephalic
Oral: Moist Mucosa
Neck: Supple, No JVD
Lungs: Diminished, Normal air movement, No rhonchi, No wheeze, No rales
Cardiovascular: Regular rate, Regular Rhythm, Normal S1, Normal S2, No murmurs
Abdomen: Soft, Non Tender, Non-Distended, No Hepato-splenomegaly
Extremities: No edema, Capillary Refill Less than 3 Seconds
Skin: Chronic venous stasis changes with right lower extremity redness
Musculoskeletal: No Tenderness to Palpation of Joints or Extremities
Neurological: Cranial nerves II-XII grossly intact, Motor Exam 5/5 strength throughout, Sensory exam intact to light touch and pain
Psych/Mental Status: Normal Affect, Appropriate

Assessment & Plan
Assessment/Plan
(1) Paroxysmal A-fib: 
PLAN: 
Plan

1.  Bibasilar community-acquired pneumonia with hypoxia with right lower extremity cellulitis
? Continue with Rocephin, his hypoxia has resolved
? He did have an elevated D-dimer and CTA of the chest was negative for PE
? He is SNF appropriate will consult case management for assistance in discharge planning

2.  Paroxysmal A-fib with RVR/HTN/HLD/aortic stenosis status post replacement/history of CVA/history of retinal artery occlusion
?Continue with his home blood pressure medications
? Continue with his home blood pressure medications, will monitor make adjustments as necessary
? Continue with his home cholesterol medications

3.  Anemia of chronic disease in setting of CKD 3
? Does have a history of polycystic kidney disease
? We will monitor renal function currently at baseline

4.  C[facility]RDENAS with a history of cirrhosis and previous liver cancer/GERD
? He does have stable ascites, will continue with his home medications
? Continue with his PPI

5.  Gout
? Stable
? Continue with his home allopurinol

6.  Anxiety/depression
? Stable
? Continue with home medications

DVT: Lovenox

Charges/Coding
Visit Charges
Inpatient E&M: 60628 Subs Hosp L2

## 2023-12-26 VITALS
SYSTOLIC BLOOD PRESSURE: 127 MMHG | RESPIRATION RATE: 18 BRPM | HEART RATE: 72 BPM | TEMPERATURE: 97.88 F | DIASTOLIC BLOOD PRESSURE: 81 MMHG | OXYGEN SATURATION: 96 %

## 2023-12-26 VITALS — DIASTOLIC BLOOD PRESSURE: 76 MMHG | HEART RATE: 66 BPM | SYSTOLIC BLOOD PRESSURE: 115 MMHG

## 2023-12-26 VITALS
SYSTOLIC BLOOD PRESSURE: 125 MMHG | DIASTOLIC BLOOD PRESSURE: 80 MMHG | RESPIRATION RATE: 18 BRPM | HEART RATE: 69 BPM | TEMPERATURE: 97.52 F | OXYGEN SATURATION: 96 %

## 2023-12-26 VITALS
RESPIRATION RATE: 16 BRPM | OXYGEN SATURATION: 93 % | SYSTOLIC BLOOD PRESSURE: 115 MMHG | HEART RATE: 66 BPM | TEMPERATURE: 97.5 F | DIASTOLIC BLOOD PRESSURE: 76 MMHG

## 2023-12-26 VITALS — OXYGEN SATURATION: 94 % | RESPIRATION RATE: 20 BRPM | HEART RATE: 75 BPM

## 2023-12-26 VITALS — OXYGEN SATURATION: 97 %

## 2023-12-26 VITALS — OXYGEN SATURATION: 99 %

## 2023-12-26 LAB
1,25(OH)2D3 SERPL-MCNC: 28.7 PG/ML (ref 24.8–81.5)
ANION GAP: 2 (ref 5–15)
BUN SERPL-MCNC: 24 MG/DL (ref 7–18)
BUN/CREAT RATIO: 31 RATIO (ref 10–20)
CALCIUM SERPL-MCNC: 10.7 MG/DL (ref 8.5–10.1)
CARBON DIOXIDE: 27 MMOL/L (ref 21–32)
CHLORIDE: 112 MMOL/L (ref 98–107)
DEPRECATED RDW RBC: 58.3 FL (ref 35.1–43.9)
DIFFERENTIAL INDICATED: (no result)
ERYTHROCYTE [DISTWIDTH] IN BLOOD: 15.1 % (ref 11.6–14.6)
EST GLOM FILT RATE - AFR AMER: 123 ML/MIN (ref 60–?)
ESTIMATED CREATININE CLEARANCE: 57.79 ML/MIN
GLUCOSE: 94 MG/DL (ref 74–106)
HCT VFR BLD AUTO: 36.2 % (ref 40–54)
HGB BLD-MCNC: 11.3 G/DL (ref 13–16.5)
IMMATURE GRANULOCYTES COUNT: 0.01 X10^3/UL (ref 0–0)
MANUAL DIF COMMENT BLD-IMP: (no result)
MCV RBC: 104.6 FL (ref 80–94)
MEAN CORP HGB CONC: 31.2 G/DL (ref 32–36)
MEAN PLATELET VOL.: 10.5 FL (ref 6.2–12)
NRBC FLAGGED BY ANALYZER: 0 % (ref 0–5)
PLATELET # BLD: 62 K/MM3 (ref 150–450)
POSITIVE COUNT: YES
POSITIVE DIFFERENTIAL: YES
POTASSIUM: 4 MMOL/L (ref 3.5–5.1)
RBC # BLD AUTO: 3.46 M/MM3 (ref 4.6–6.2)
SCAN SMEAR PER REVIEW CRITERIA: (no result)
WBC # BLD AUTO: 3.2 K/MM3 (ref 4.4–11)

## 2023-12-26 RX ADMIN — Medication 125 MCG: at 09:34

## 2023-12-26 RX ADMIN — TOLTERODINE TARTRATE 4 MG: 4 CAPSULE, EXTENDED RELEASE ORAL at 09:34

## 2023-12-26 RX ADMIN — MICONAZOLE NITRATE 1 APPLIC: 20 POWDER TOPICAL at 09:49

## 2023-12-26 RX ADMIN — ASPIRIN 81 MG: 81 TABLET, COATED ORAL at 09:33

## 2023-12-26 RX ADMIN — Medication 128 MG: at 09:34

## 2023-12-26 RX ADMIN — Medication 50 MG: at 09:41

## 2023-12-26 RX ADMIN — ALBUTEROL SULFATE 2.5 MG: 2.5 SOLUTION RESPIRATORY (INHALATION) at 13:10

## 2023-12-26 RX ADMIN — BUDESONIDE 0.5 MG: 0.5 SUSPENSION RESPIRATORY (INHALATION) at 07:16

## 2023-12-26 RX ADMIN — ALBUTEROL SULFATE 2.5 MG: 2.5 SOLUTION RESPIRATORY (INHALATION) at 07:16

## 2023-12-26 NOTE — PCM.TXEXTCAR
Diet
Diet Order/Speech Therapy: 


12/22/23 02:05
Diet: Cardiac - Heart Healthy 
 Is pt able to select menu?: Yes
 Fluid restriction:: 1500 mL



Routine Orders/Code Status
Routine Lab Work: CBC and BMP
Code Status: DNRCC-A
Wound(s)
right shin: 
      Wound Type: small open blister
      Dressing Change: Adaptic
Therapies
Physical Therapy: Eval and Treat
Occupational Therapy: Eval and Treat
Problem/Diagnosis
(1) Paroxysmal A-fib: 
      Status: Acute
      Code(s):
I48.0 - Paroxysmal atrial fibrillation

Plan

1.  Bibasilar community-acquired pneumonia with hypoxia with right lower extremity cellulitis
? Continue with Rocephin, his hypoxia has resolved
? He did have an elevated D-dimer and CTA of the chest was negative for PE
? He is SNF appropriate will consult case management for assistance in discharge planning

2.  Paroxysmal A-fib with RVR/HTN/HLD/aortic stenosis status post replacement/history of CVA/history of retinal artery occlusion
?Continue with his home blood pressure medications
? Continue with his home blood pressure medications, will monitor make adjustments as necessary
? Continue with his home cholesterol medications

3.  Anemia of chronic disease in setting of CKD 3
? Does have a history of polycystic kidney disease
? We will monitor renal function currently at baseline

4.  C[facility]RDENAS with a history of cirrhosis and previous liver cancer/GERD/chronic thrombocytopenia
? He does have stable ascites, will continue with his home medications
? Continue with his PPI

5.  Gout
? Stable
? Continue with his home allopurinol

6.  Anxiety/depression
? Stable
? Continue with home medications

DVT: Lovenox
Allergies/Procedures Done in Hospital
Allergies

hydrocodone [From Vicodin] Allergy (Verified 12/21/23 21:38)
 Other
strawberry Allergy (Verified 12/21/23 21:38)
 Hives
venom-honey bee [bee venom (honey bee)] Allergy (Verified 12/21/23 21:38)
 Anaphylaxis
adhesive tape Adverse Reaction (Verified 07/15/23 19:14)
 Rash
atorvastatin Adverse Reaction (Verified 12/21/23 21:38)
 PT UNSURE OF REACTION
montelukast Adverse Reaction (Verified 12/21/23 21:38)
 PT UNSURE OF REACTION


Procedures: None
Type of Care/Length of Stay
Estimated LOS: Convalescent Care Less Than 30 days
Type of Care Needed: Skilled
Rehab Potential: Fair
Prognosis: Fair
Additional Orders/Day of Discharge
Day of Discharge: 12/26/23
Dietary and Speech Recommendations
Dietitian Recommendations/Changes: continue cardiac diet w/ 1500mL fluid restriction as indicated

Discharge Plan
Admission
Admit Date/Time: 12/22/23 00:54

Attending Provider: Issac Bhatti

Primary Care Provider: Júnior Ahuja

Consulting Providers: Jasmin Pantoja; Isaías Arrington; Britney Santiago; Aron Garcia; Emmie Cohen; Wolfgang Dotson; Rd Willis; Margarito Ashby; Myron Villaseñor; Daniel Zamora; Lito Griggs; Mario Alberto Gorman; Alesha Parr; Tello Merchant; Tee Harper; 
Domo Simon; Christos Mendes; Juan Khanna NP; Jasmin Hernández NP; Kiana Soria PA; Joseph Li

Discharge Orders/Prescriptions
Prescriptions:
New
  cefdinir 300 mg capsule 
   300 mg PO BID 4 Days Qty: 8 0RF

Continued
  ferrous sulfate 325 mg (65 mg iron) tablet 
   325 mg PO BID 
  aspirin [Adult Aspirin Regimen] 81 mg tablet,delayed release (DR/EC) 
   81 mg PO DAILY 
  oxybutynin chloride 15 mg tablet extended release 24hr 
   30 mg PO DAILY 
  albuterol sulfate 90 mcg/actuation HFA aerosol inhaler 
   2 puff inhalation Q6H PRN (Reason: ASTHMA) 
  fluticasone propionate 50 mcg/actuation spray,suspension 
   1 spray intranasal DAILY 
  omeprazole 40 mg capsule,delayed release(DR/EC) 
   40 mg PO DAILY 
  furosemide 20 mg tablet 
   20 mg PO Q OTHER DAY 
  ascorbic acid (vitamin C) 500 mg capsule 
   500 mg PO DAILY 
  nitroglycerin 0.4 mg tablet, sublingual 
   0.4 mg sublingual Q5M PRN (Reason: chest pain) 
   Rx Instructions:
   do not exceed 3 doses per episode
  magnesium oxide 400 MG tablet 
   400 mg PO DAILY 
  allopurinol 300 MG tablet 
   300 mg PO DAILY 
   Patient Comments:
   GOUT
  multivit-min-FA-lycopen-lutein 1 EACH tablet 
   1 ea PO DAILY 
  fenofibrate nanocrystallized 48 MG tablet 
   48 mg PO DAILY 
   Patient Comments:
   CHOLESTEROL
  sertraline 100 MG tablet 
   50 mg PO QHS 
  lidocaine [Lidoderm] 5 % adhesive patch,medicated 
   1 patch topical DAILY Qty: 15 0RF
   Rx Instructions:
   leave on most painful area for up to 12 hrs
  diltiazem HCl 120 mg capsule,extended release 24hr 
   120 mg PO DAILY Qty: 30 0RF
  metoprolol tartrate 25 mg tablet 
   25 mg PO BID Qty: 60 0RF
  Xifaxan 550 mg tablet 
   550 mg PO BID 
  cyanocobalamin (vitamin B-12) 5,000 mcg capsule 
   2,500 mcg PO DAILY 
  fluticasone furoate-vilanterol [Breo Ellipta] 200-25 mcg/dose blister with device 
   1 inh inhalation DAILY 

Referrals / Follow Up:
Júnior Ahuja MD [Primary Care Provider] - 

Disposition
Disposition (needs filled in before D/C Order can be placed): Skilled Nursing Facility

## 2023-12-26 NOTE — DS.PCM_ITS
Providers    
Date of Admission: 12/22/23    
Primary Care Physician:     
Dr. Júnior Ahuja MD    
    
Consultations    
    
12/22/23 01:32    
Consult: Cardiology Routine     
   Consulting Provider: [facility]    
   Reason for Consult: Paroxysmal atrial fibrillation with rapid ventricular   
response    
   EMERGENT Consult: No    
   MD Notified: Yes    
   Date Notified: 12/22/23    
   Time Notified: 06:36    
   Method of Notification: Text    
   Method of Consult:: In-Person    
    
12/22/23 02:06    
Consult: Onc/Wound/Ostomy RN Routine     
   Comment:     
    
    
    
Reason For Visit: BIBASILAR PNEUMONIA & PARAXYSMAL ATRIAL FIB    
    
Diagnosis    
Discharge Diagnosis    
(1) Paroxysmal A-fib:     
      Status: Acute    
      Code(s):    
I48.0 - Paroxysmal atrial fibrillation    
    
Medications at Discharge    
Home Medications    
    
allopurinol 300 mg tablet 300 mg PO DAILY gout 12/27/16     
fenofibrate nanocrystallized 48 mg tablet 48 mg PO DAILY cholesterol 12/27/16     
magnesium oxide 400 mg (241.3 mg magnesium) tablet 400 mg PO DAILY supplement   
12/27/16     
multivit-min-folic acid 0.4 mg-lycopene 300 mcg-lutein 250 mcg tablet 1 ea PO   
DAILY vitamin 12/27/16     
sertraline 100 mg tablet 50 mg PO QHS depression 05/21/20     
aspirin 81 mg tablet,delayed release (Adult Aspirin Regimen) 81 mg PO DAILY   
07/27/21     
ferrous sulfate 325 mg (65 mg iron) tablet 325 mg PO BID 07/27/21     
albuterol sulfate 90 mcg/actuation aerosol inhaler 2 puff inhalation Q6H PRN   
ASTHMA 02/08/22     
fluticasone propionate 50 mcg/actuation nasal spray,suspension 1 spray   
intranasal DAILY 02/08/22     
omeprazole 40 mg capsule,delayed release 40 mg PO DAILY 02/08/22     
oxybutynin chloride 15 mg tablet,extended release 24 hr 30 mg PO DAILY 02/08/22     
ascorbic acid (vitamin C) 500 mg capsule 500 mg PO DAILY 01/31/23     
furosemide 20 mg tablet 20 mg PO Q OTHER DAY 01/31/23     
nitroglycerin 0.4 mg sublingual tablet 0.4 mg sublingual Q5M PRN chest pain   
01/31/23     
lidocaine 5 % topical patch (Lidoderm) 1 patch topical DAILY #15 ea 05/28/23     
cyanocobalamin (vitamin B-12) 5,000 mcg capsule 2,500 mcg PO DAILY 07/15/23     
rifaximin 550 mg tablet (Xifaxan) 550 mg PO BID 07/15/23     
diltiazem HCl 120 mg capsule,extended release 24 hr 120 mg PO DAILY #30 caps   
08/23/23     
metoprolol tartrate 25 mg tablet 25 mg PO BID #60 tabs 08/23/23     
fluticasone furoate 200 mcg-vilanterol 25 mcg/dose inhalation powder (Breo   
Ellipta) 1 inh inhalation DAILY 12/21/23     
cefdinir 300 mg capsule 300 mg PO BID 4 days #8 caps 12/26/23     
    
    
    
Hospital Course    
Operations    
None    
Procedures    
None    
Summary of Care Provided    
Minutes Spent on Discharge: 36    
Hospital Course:     
    
Per HPI:     
    
Hospital Course:    
1.  Bibasilar community-acquired pneumonia with hypoxia with right lower   
extremity cellulitis    
? Continue with Rocephin, his hypoxia has resolved    
? He did have an elevated D-dimer and CTA of the chest was negative for PE    
? He is SNF appropriate will consult case management for assistance in discharge  
planning    
12/26/2023: Remains stable does well wearing his BiPAP at night has not needed   
much oxygen throughout the day and tolerating his antibiotic.  He did complete   
his azithromycin and will just finish a few days more of p.o. cefdinir.  I   
discussed with him the plan for discharge to SNF today he expressed   
understanding of the risk and benefits of going to the nursing home and would   
like to go today to start his physical therapy.  All of his home medications   
were continued, he does have some chronic lower extremity edema which was   
negative on Doppler for DVT    
    
2.  Paroxysmal A-fib with RVR/HTN/HLD/aortic stenosis status post   
replacement/history of CVA/history of retinal artery occlusion    
?Continue with his home blood pressure medications    
? Continue with his home blood pressure medications, will monitor make   
adjustments as necessary    
? Continue with his home cholesterol medications    
    
3.  Anemia of chronic disease in setting of CKD 3    
? Does have a history of polycystic kidney disease    
? We will monitor renal function currently at baseline    
    
4.  CÁRDENAS with a history of cirrhosis and previous liver cancer/GERD/chronic   
thrombocytopenia    
? He does have stable ascites, will continue with his home medications    
? Continue with his PPI    
    
5.  Gout    
? Stable    
? Continue with his home allopurinol    
    
6.  Anxiety/depression    
? Stable    
? Continue with home medications    
    
    
Physical Exam    
Narrative    
General: Alert, Oriented x3, Cooperative, No apparent distress    
HEENT: Atraumatic, PERRLA, EOMI, Normocephalic    
Oral: Moist Mucosa    
Neck: Supple, No JVD    
Lungs: Diminished, Normal air movement, No rhonchi, No wheeze, No rales    
Cardiovascular: Regular rate, Regular Rhythm, Normal S1, Normal S2, No murmurs    
Abdomen: Soft, Non Tender, Non-Distended, No Hepato-splenomegaly    
Extremities: No edema, Capillary Refill Less than 3 Seconds    
Skin: Chronic venous stasis changes with right lower extremity redness    
Musculoskeletal: No Tenderness to Palpation of Joints or Extremities    
Neurological: Cranial nerves II-XII grossly intact, Motor Exam 5/5 strength   
throughout, Sensory exam intact to light touch and pain    
Psych/Mental Status: Normal Affect, Appropriate    
    
Weight / BMI    
                                     Weight    
    
    
    
Weight:                        266 lb 12.149 oz                                   
    
     
Body Mass Index (BMI)          38.2                                               
    
    
    
    
    
ABG / Lab / Microbiology Data    
    
                                                        12/26/23 05:37              
    
                                                        12/26/23 05:37              
    
Laboratory:     
                         Laboratory Results - last 24 hr    
    
12/22/23 03:30: Vit D 1,25-Dihydroxy 28.7    
12/24/23 05:40: Diff Path Review Reviewed    
12/26/23 05:37: WBC 3.2 L, RBC 3.46 L, Hgb 11.3 L, Hct 36.2 L, .6 H, MCH   
32.7 H, MCHC 31.2 L, RDW Std Deviation 58.3 H, RDW Coeff of Ilia 15.1 H, Plt   
Count 62 L, MPV 10.5, Immature Gran % (Auto) 0.300, Neut % (Auto) 81.5 H, Lymph   
% (Auto) 6.5 L, Mono % (Auto) 6.8, Eos % (Auto) 4.0, Baso % (Auto) 0.9, Absolute  
Neuts (auto) 2.6, Absolute Lymphs (auto) 0.21 L, Nucleated RBC % 0, Differential  
Comment SCANNED, Diff Path Review May foll, Sodium 141, Potassium 4.0, Chloride   
112 H, Carbon Dioxide 27.0, Anion Gap 2 L, BUN 24 H, Creatinine 0.77, Estim   
Creat Clear Calc 57.79, Est GFR (MDRD) Af Amer 123, Est GFR (MDRD) Non-Af 102,   
BUN/Creatinine Ratio 31.0 H, Glucose 94, Calcium 10.7 H    
    
    
Radiography    
Diagnostic Testing:     
                              Radiology Impression    
    
Venous Doppler Study  12/22/23 01:00    
Interpretation Summary    
Deep veins of the bilateral lower extremities are patent and compressible   
segmentally. There is no    
evidence of bilateral lower extremity deep vein thrombosis. The bilateral great   
saphenous veins    
appear patent and compressible segmentally.    
     
     
______________________________________________________________________________    
Ordering Physician: Joseph Li    
Referring Physician: Júnior Ahuja M.D.    
Performed By: Monique Guo RVT    
Electronically signed by: Margarito Brunner MD on 12/26/2023 11:35 AM    
     
     
    
    
    
    
Meaningful Use Info    
Meaningful Use Diagnoses (Choose all that apply): None applicable    
    
Discharge Plan    
Admission    
Admit Date/Time: 12/22/23 00:54    
    
Attending Provider: Issac Bhatti    
    
Primary Care Provider: Júnior Ahuja    
    
Consulting Providers: Jasmin Pantoja; Isaías Arrington; Britney Santiago; Aron Garcia;   
Emmie Cohen; Wolfgang Dotson; Rd Willis; Margarito Ashby; Myron Villaseñor; Daniel Zamora;   
Lito Griggs; Mario Alberto Gorman; Alesha Parr; Tello Merchant;   
Tee Harper; Domo Simon; Christos Mendes; Juan Khanna NP; Jasmin Hernández NP;  
Kiana Soria; Joseph Li    
    
Discharge Orders/Prescriptions    
Prescriptions:    
New    
  cefdinir 300 mg capsule     
   300 mg PO BID 4 Days Qty: 8 0RF    
    
Continued    
  ferrous sulfate 325 mg (65 mg iron) tablet     
   325 mg PO BID     
  aspirin [Adult Aspirin Regimen] 81 mg tablet,delayed release (DR/EC)     
   81 mg PO DAILY     
  oxybutynin chloride 15 mg tablet extended release 24hr     
   30 mg PO DAILY     
  albuterol sulfate 90 mcg/actuation HFA aerosol inhaler     
   2 puff inhalation Q6H PRN (Reason: ASTHMA)     
  fluticasone propionate 50 mcg/actuation spray,suspension     
   1 spray intranasal DAILY     
  omeprazole 40 mg capsule,delayed release(DR/EC)     
   40 mg PO DAILY     
  furosemide 20 mg tablet     
   20 mg PO Q OTHER DAY     
  ascorbic acid (vitamin C) 500 mg capsule     
   500 mg PO DAILY     
  nitroglycerin 0.4 mg tablet, sublingual     
   0.4 mg sublingual Q5M PRN (Reason: chest pain)     
   Rx Instructions:    
   do not exceed 3 doses per episode    
  magnesium oxide 400 MG tablet     
   400 mg PO DAILY     
  allopurinol 300 MG tablet     
   300 mg PO DAILY     
   Patient Comments:    
   GOUT    
  multivit-min-FA-lycopen-lutein 1 EACH tablet     
   1 ea PO DAILY     
  fenofibrate nanocrystallized 48 MG tablet     
   48 mg PO DAILY     
   Patient Comments:    
   CHOLESTEROL    
  sertraline 100 MG tablet     
   50 mg PO QHS     
  lidocaine [Lidoderm] 5 % adhesive patch,medicated     
   1 patch topical DAILY Qty: 15 0RF    
   Rx Instructions:    
   leave on most painful area for up to 12 hrs    
  diltiazem HCl 120 mg capsule,extended release 24hr     
   120 mg PO DAILY Qty: 30 0RF    
  metoprolol tartrate 25 mg tablet     
   25 mg PO BID Qty: 60 0RF    
  Xifaxan 550 mg tablet     
   550 mg PO BID     
  cyanocobalamin (vitamin B-12) 5,000 mcg capsule     
   2,500 mcg PO DAILY     
  fluticasone furoate-vilanterol [Breo Ellipta] 200-25 mcg/dose blister with   
device     
   1 inh inhalation DAILY     
    
Referrals / Follow Up:    
Júnior Ahuja MD [Primary Care Provider] -     
    
Disposition    
Disposition (needs filled in before D/C Order can be placed): Skilled Nursing   
Facility    
    
    
Charges/Coding    
Visit Charges    
Inpatient E&M: 46478 Disch Hosp >30min

## 2023-12-26 NOTE — PN.HOSP_ITS
Subjective    
Subjective    
Doing well, no issues overnight    
    
Objective Data    
Objective Data    
Vital Signs:     
Vital Signs    
    
    
    
Temp Pulse Resp BP Pulse Ox O2 Del Method O2 Flow Rate    
     
 97.5 F L  66   16   115/76   93   Nasal Cannula   2     
     
 12/26/23 09:28  12/26/23 09:33  12/26/23 09:28  12/26/23 09:33  12/26/23 09:28   
12/26/23 09:28  12/26/23 09:28    
    
    
    
    
Oxygen Flow Rate (L/min)         2                                                
       
Oxygen Delivery Method           Nasal Cannula                                    
       
Weight:                          266 lb 12.149 oz                                 
       
Body Mass Index (BMI)            38.2                                             
       
    
    
Intake & Output:     
                       Intake and Output for Last 24 Hours    
    
    
    
 12/25/23 12/26/23 12/27/23    
    
 03:59 03:59 03:59    
     
Intake Total 1245 / 1245 1190 / 1190     
     
Output Total 1425 / 1425 900 / 900     
     
Balance -180 / -180 290 / 290     
    
    
    
Lab / Micro Data    
    
                                                        12/26/23 05:37              
    
                                                        12/26/23 05:37              
    
Labs:     
                         Laboratory Results - last 24 hr    
    
12/24/23 05:40: Diff Path Review May foll    
12/26/23 05:37: WBC 3.2 L, RBC 3.46 L, Hgb 11.3 L, Hct 36.2 L, .6 H, MCH   
32.7 H, MCHC 31.2 L, RDW Std Deviation 58.3 H, RDW Coeff of Ilia 15.1 H, Plt   
Count 62 L, MPV 10.5, Immature Gran % (Auto) 0.300, Neut % (Auto) 81.5 H, Lymph   
% (Auto) 6.5 L, Mono % (Auto) 6.8, Eos % (Auto) 4.0, Baso % (Auto) 0.9, Absolute  
Neuts (auto) 2.6, Absolute Lymphs (auto) 0.21 L, Nucleated RBC % 0, Differential  
Comment SCANNED, Diff Path Review May foll, Sodium 141, Potassium 4.0, Chloride   
112 H, Carbon Dioxide 27.0, Anion Gap 2 L, BUN 24 H, Creatinine 0.77, Estim   
Creat Clear Calc 57.79, Est GFR (MDRD) Af Amer 123, Est GFR (MDRD) Non-Af 102,   
BUN/Creatinine Ratio 31.0 H, Glucose 94, Calcium 10.7 H    
    
    
    
Physical Exam    
Narrative    
General: Alert, Oriented x3, Cooperative, No apparent distress    
HEENT: Atraumatic, PERRLA, EOMI, Normocephalic    
Oral: Moist Mucosa    
Neck: Supple, No JVD    
Lungs: Diminished, Normal air movement, No rhonchi, No wheeze, No rales    
Cardiovascular: Regular rate, Regular Rhythm, Normal S1, Normal S2, No murmurs    
Abdomen: Soft, Non Tender, Non-Distended, No Hepato-splenomegaly    
Extremities: No edema, Capillary Refill Less than 3 Seconds    
Skin: Chronic venous stasis changes with right lower extremity redness    
Musculoskeletal: No Tenderness to Palpation of Joints or Extremities    
Neurological: Cranial nerves II-XII grossly intact, Motor Exam 5/5 strength   
throughout, Sensory exam intact to light touch and pain    
Psych/Mental Status: Normal Affect, Appropriate    
    
Assessment & Plan    
Assessment/Plan    
(1) Paroxysmal A-fib:     
PLAN:     
Plan    
    
1.  Bibasilar community-acquired pneumonia with hypoxia with right lower   
extremity cellulitis    
? Continue with Rocephin, his hypoxia has resolved    
? He did have an elevated D-dimer and CTA of the chest was negative for PE    
? He is SNF appropriate will consult case management for assistance in discharge  
planning    
    
2.  Paroxysmal A-fib with RVR/HTN/HLD/aortic stenosis status post   
replacement/history of CVA/history of retinal artery occlusion    
?Continue with his home blood pressure medications    
? Continue with his home blood pressure medications, will monitor make   
adjustments as necessary    
? Continue with his home cholesterol medications    
    
3.  Anemia of chronic disease in setting of CKD 3    
? Does have a history of polycystic kidney disease    
? We will monitor renal function currently at baseline    
    
4.  CÁRDENAS with a history of cirrhosis and previous liver cancer/GERD/chronic   
thrombocytopenia    
? He does have stable ascites, will continue with his home medications    
? Continue with his PPI    
    
5.  Gout    
? Stable    
? Continue with his home allopurinol    
    
6.  Anxiety/depression    
? Stable    
? Continue with home medications    
    
DVT: Lovenox    
    
Charges/Coding    
Visit Charges    
Inpatient E&M: 66576 Subs Hosp L2

## 2023-12-26 NOTE — PCM.DC.SUM
Providers
Date of Admission: 12/22/23
Primary Care Physician: 
Dr. Júnior Ahuja MD

Consultations

12/22/23 01:32
Consult: Cardiology Routine 
 Consulting Provider: [facility]
 Reason for Consult: Paroxysmal atrial fibrillation with rapid ventricular response
 EMERGENT Consult: No
 MD Notified: Yes
 Date Notified: 12/22/23
 Time Notified: 06:36
 Method of Notification: Text
 Method of Consult:: In-Person

12/22/23 02:06
Consult: Onc/Wound/Ostomy RN Routine 
 Comment: 



Reason For Visit: BIBASILAR PNEUMONIA & PARAXYSMAL ATRIAL FIB

Diagnosis
Discharge Diagnosis
(1) Paroxysmal A-fib: 
      Status: Acute
      Code(s):
I48.0 - Paroxysmal atrial fibrillation

Medications at Discharge
Home Medications

allopurinol 300 mg tablet 300 mg PO DAILY gout 12/27/16 
fenofibrate nanocrystallized 48 mg tablet 48 mg PO DAILY cholesterol 12/27/16 
magnesium oxide 400 mg (241.3 mg magnesium) tablet 400 mg PO DAILY supplement 12/27/16 
multivit-min-folic acid 0.4 mg-lycopene 300 mcg-lutein 250 mcg tablet 1 ea PO DAILY vitamin 12/27/16 
sertraline 100 mg tablet 50 mg PO QHS depression 05/21/20 
aspirin 81 mg tablet,delayed release (Adult Aspirin Regimen) 81 mg PO DAILY 07/27/21 
ferrous sulfate 325 mg (65 mg iron) tablet 325 mg PO BID 07/27/21 
albuterol sulfate 90 mcg/actuation aerosol inhaler 2 puff inhalation Q6H PRN ASTHMA 02/08/22 
fluticasone propionate 50 mcg/actuation nasal spray,suspension 1 spray intranasal DAILY 02/08/22 
omeprazole 40 mg capsule,delayed release 40 mg PO DAILY 02/08/22 
oxybutynin chloride 15 mg tablet,extended release 24 hr 30 mg PO DAILY 02/08/22 
ascorbic acid (vitamin C) 500 mg capsule 500 mg PO DAILY 01/31/23 
furosemide 20 mg tablet 20 mg PO Q OTHER DAY 01/31/23 
nitroglycerin 0.4 mg sublingual tablet 0.4 mg sublingual Q5M PRN chest pain 01/31/23 
lidocaine 5 % topical patch (Lidoderm) 1 patch topical DAILY #15 ea 05/28/23 
cyanocobalamin (vitamin B-12) 5,000 mcg capsule 2,500 mcg PO DAILY 07/15/23 
rifaximin 550 mg tablet (Xifaxan) 550 mg PO BID 07/15/23 
diltiazem HCl 120 mg capsule,extended release 24 hr 120 mg PO DAILY #30 caps 08/23/23 
metoprolol tartrate 25 mg tablet 25 mg PO BID #60 tabs 08/23/23 
fluticasone furoate 200 mcg-vilanterol 25 mcg/dose inhalation powder (Breo Ellipta) 1 inh inhalation DAILY 12/21/23 
cefdinir 300 mg capsule 300 mg PO BID 4 days #8 caps 12/26/23 



Hospital Course
Operations
None
Procedures
None
Summary of Care Provided
Minutes Spent on Discharge: 36
Hospital Course: 

Per HPI: 

Hospital Course:
1.  Bibasilar community-acquired pneumonia with hypoxia with right lower extremity cellulitis
? Continue with Rocephin, his hypoxia has resolved
? He did have an elevated D-dimer and CTA of the chest was negative for PE
? He is SNF appropriate will consult case management for assistance in discharge planning
12/26/2023: Remains stable does well wearing his BiPAP at night has not needed much oxygen throughout the day and tolerating his antibiotic.  He did complete his azithromycin and will just finish a few days more of p.o. cefdinir.  I discussed with 
him the plan for discharge to SNF today he expressed understanding of the risk and benefits of going to the nursing home and would like to go today to start his physical therapy.  All of his home medications were continued, he does have some chronic 
lower extremity edema which was negative on Doppler for DVT

2.  Paroxysmal A-fib with RVR/HTN/HLD/aortic stenosis status post replacement/history of CVA/history of retinal artery occlusion
?Continue with his home blood pressure medications
? Continue with his home blood pressure medications, will monitor make adjustments as necessary
? Continue with his home cholesterol medications

3.  Anemia of chronic disease in setting of CKD 3
? Does have a history of polycystic kidney disease
? We will monitor renal function currently at baseline

4.  CÁRDENAS with a history of cirrhosis and previous liver cancer/GERD/chronic thrombocytopenia
? He does have stable ascites, will continue with his home medications
? Continue with his PPI

5.  Gout
? Stable
? Continue with his home allopurinol

6.  Anxiety/depression
? Stable
? Continue with home medications


Physical Exam
Narrative
General: Alert, Oriented x3, Cooperative, No apparent distress
HEENT: Atraumatic, PERRLA, EOMI, Normocephalic
Oral: Moist Mucosa
Neck: Supple, No JVD
Lungs: Diminished, Normal air movement, No rhonchi, No wheeze, No rales
Cardiovascular: Regular rate, Regular Rhythm, Normal S1, Normal S2, No murmurs
Abdomen: Soft, Non Tender, Non-Distended, No Hepato-splenomegaly
Extremities: No edema, Capillary Refill Less than 3 Seconds
Skin: Chronic venous stasis changes with right lower extremity redness
Musculoskeletal: No Tenderness to Palpation of Joints or Extremities
Neurological: Cranial nerves II-XII grossly intact, Motor Exam 5/5 strength throughout, Sensory exam intact to light touch and pain
Psych/Mental Status: Normal Affect, Appropriate

Weight / BMI
Weight

Weight:                        266 lb 12.149 oz                                  
Body Mass Index (BMI)          38.2                                              



ABG / Lab / Microbiology Data

12/26/23 05:37          

12/26/23 05:37          

Laboratory: 
Laboratory Results - last 24 hr

12/22/23 03:30: Vit D 1,25-Dihydroxy 28.7
12/24/23 05:40: Diff Path Review Reviewed
12/26/23 05:37: WBC 3.2 L, RBC 3.46 L, Hgb 11.3 L, Hct 36.2 L, .6 H, MCH 32.7 H, MCHC 31.2 L, RDW Std Deviation 58.3 H, RDW Coeff of Ilia 15.1 H, Plt Count 62 L, MPV 10.5, Immature Gran % (Auto) 0.300, Neut % (Auto) 81.5 H, Lymph % (Auto) 6.5 
L, Mono % (Auto) 6.8, Eos % (Auto) 4.0, Baso % (Auto) 0.9, Absolute Neuts (auto) 2.6, Absolute Lymphs (auto) 0.21 L, Nucleated RBC % 0, Differential Comment SCANNED, Diff Path Review May foll, Sodium 141, Potassium 4.0, Chloride 112 H, Carbon 
Dioxide 27.0, Anion Gap 2 L, BUN 24 H, Creatinine 0.77, Estim Creat Clear Calc 57.79, Est GFR (MDRD) Af Amer 123, Est GFR (MDRD) Non-Af 102, BUN/Creatinine Ratio 31.0 H, Glucose 94, Calcium 10.7 H


Radiography
Diagnostic Testing: 
Radiology Impression

Venous Doppler Study  12/22/23 01:00
Interpretation Summary
Deep veins of the bilateral lower extremities are patent and compressible segmentally. There is no
evidence of bilateral lower extremity deep vein thrombosis. The bilateral great saphenous veins
appear patent and compressible segmentally.
 
 
______________________________________________________________________________
Ordering Physician: Joseph Li
Referring Physician: Júnior Ahuja M.D.
Performed By: Monique Guo RVT
Electronically signed by: Margarito Brunner MD on 12/26/2023 11:35 AM
 
 




Meaningful Use Info
Meaningful Use Diagnoses (Choose all that apply): None applicable

Discharge Plan
Admission
Admit Date/Time: 12/22/23 00:54

Attending Provider: Issac Bhatti

Primary Care Provider: Júnior Ahuja

Consulting Providers: Jasmin Pantoja; Isaías Arrington; Britney Santiago; Aron Garcia; Emmie Cohen; Wolfgang Dotson; Rd Willis; Margarito Ashby; Myron Villaseñor; Daniel Zamora; Lito Griggs; Mario Alberto Gorman; Alesha Parr; Tello Merchant; Tee Harper; 
Domo Simon; Christos Mendes; Juan Khanna NP; Jasmin Hernández NP; Kiana Soria; Joseph Li

Discharge Orders/Prescriptions
Prescriptions:
New
  cefdinir 300 mg capsule 
   300 mg PO BID 4 Days Qty: 8 0RF

Continued
  ferrous sulfate 325 mg (65 mg iron) tablet 
   325 mg PO BID 
  aspirin [Adult Aspirin Regimen] 81 mg tablet,delayed release (DR/EC) 
   81 mg PO DAILY 
  oxybutynin chloride 15 mg tablet extended release 24hr 
   30 mg PO DAILY 
  albuterol sulfate 90 mcg/actuation HFA aerosol inhaler 
   2 puff inhalation Q6H PRN (Reason: ASTHMA) 
  fluticasone propionate 50 mcg/actuation spray,suspension 
   1 spray intranasal DAILY 
  omeprazole 40 mg capsule,delayed release(DR/EC) 
   40 mg PO DAILY 
  furosemide 20 mg tablet 
   20 mg PO Q OTHER DAY 
  ascorbic acid (vitamin C) 500 mg capsule 
   500 mg PO DAILY 
  nitroglycerin 0.4 mg tablet, sublingual 
   0.4 mg sublingual Q5M PRN (Reason: chest pain) 
   Rx Instructions:
   do not exceed 3 doses per episode
  magnesium oxide 400 MG tablet 
   400 mg PO DAILY 
  allopurinol 300 MG tablet 
   300 mg PO DAILY 
   Patient Comments:
   GOUT
  multivit-min-FA-lycopen-lutein 1 EACH tablet 
   1 ea PO DAILY 
  fenofibrate nanocrystallized 48 MG tablet 
   48 mg PO DAILY 
   Patient Comments:
   CHOLESTEROL
  sertraline 100 MG tablet 
   50 mg PO QHS 
  lidocaine [Lidoderm] 5 % adhesive patch,medicated 
   1 patch topical DAILY Qty: 15 0RF
   Rx Instructions:
   leave on most painful area for up to 12 hrs
  diltiazem HCl 120 mg capsule,extended release 24hr 
   120 mg PO DAILY Qty: 30 0RF
  metoprolol tartrate 25 mg tablet 
   25 mg PO BID Qty: 60 0RF
  Xifaxan 550 mg tablet 
   550 mg PO BID 
  cyanocobalamin (vitamin B-12) 5,000 mcg capsule 
   2,500 mcg PO DAILY 
  fluticasone furoate-vilanterol [Breo Ellipta] 200-25 mcg/dose blister with device 
   1 inh inhalation DAILY 

Referrals / Follow Up:
Júnior Ahuja MD [Primary Care Provider] - 

Disposition
Disposition (needs filled in before D/C Order can be placed): Skilled Nursing Facility


Charges/Coding
Visit Charges
Inpatient E&M: 57935 Disch Hosp >30min

## 2023-12-26 NOTE — PCM.PN.HOSP
Subjective
Subjective
Doing well, no issues overnight

Objective Data
Objective Data
Vital Signs: 
Vital Signs

Temp Pulse Resp BP Pulse Ox O2 Del Method O2 Flow Rate
 97.5 F L  66   16   115/76   93   Nasal Cannula   2 
 12/26/23 09:28  12/26/23 09:33  12/26/23 09:28  12/26/23 09:33  12/26/23 09:28  12/26/23 09:28  12/26/23 09:28



Oxygen Flow Rate (L/min)       2                                                
Oxygen Delivery Method         Nasal Cannula                                    
Weight:                        266 lb 12.149 oz                                 
Body Mass Index (BMI)          38.2                                             


Intake & Output: 
Intake and Output for Last 24 Hours

 12/25/23 12/26/23 12/27/23
 03:59 03:59 03:59
Intake Total 1245 / 1245 1190 / 1190 
Output Total 1425 / 1425 900 / 900 
Balance -180 / -180 290 / 290 


Lab / Micro Data

12/26/23 05:37          

12/26/23 05:37          

Labs: 
Laboratory Results - last 24 hr

12/24/23 05:40: Diff Path Review May foll
12/26/23 05:37: WBC 3.2 L, RBC 3.46 L, Hgb 11.3 L, Hct 36.2 L, .6 H, MCH 32.7 H, MCHC 31.2 L, RDW Std Deviation 58.3 H, RDW Coeff of Ilia 15.1 H, Plt Count 62 L, MPV 10.5, Immature Gran % (Auto) 0.300, Neut % (Auto) 81.5 H, Lymph % (Auto) 6.5 
L, Mono % (Auto) 6.8, Eos % (Auto) 4.0, Baso % (Auto) 0.9, Absolute Neuts (auto) 2.6, Absolute Lymphs (auto) 0.21 L, Nucleated RBC % 0, Differential Comment SCANNED, Diff Path Review May foll, Sodium 141, Potassium 4.0, Chloride 112 H, Carbon 
Dioxide 27.0, Anion Gap 2 L, BUN 24 H, Creatinine 0.77, Estim Creat Clear Calc 57.79, Est GFR (MDRD) Af Amer 123, Est GFR (MDRD) Non-Af 102, BUN/Creatinine Ratio 31.0 H, Glucose 94, Calcium 10.7 H



Physical Exam
Narrative
General: Alert, Oriented x3, Cooperative, No apparent distress
HEENT: Atraumatic, PERRLA, EOMI, Normocephalic
Oral: Moist Mucosa
Neck: Supple, No JVD
Lungs: Diminished, Normal air movement, No rhonchi, No wheeze, No rales
Cardiovascular: Regular rate, Regular Rhythm, Normal S1, Normal S2, No murmurs
Abdomen: Soft, Non Tender, Non-Distended, No Hepato-splenomegaly
Extremities: No edema, Capillary Refill Less than 3 Seconds
Skin: Chronic venous stasis changes with right lower extremity redness
Musculoskeletal: No Tenderness to Palpation of Joints or Extremities
Neurological: Cranial nerves II-XII grossly intact, Motor Exam 5/5 strength throughout, Sensory exam intact to light touch and pain
Psych/Mental Status: Normal Affect, Appropriate

Assessment & Plan
Assessment/Plan
(1) Paroxysmal A-fib: 
PLAN: 
Plan

1.  Bibasilar community-acquired pneumonia with hypoxia with right lower extremity cellulitis
? Continue with Rocephin, his hypoxia has resolved
? He did have an elevated D-dimer and CTA of the chest was negative for PE
? He is SNF appropriate will consult case management for assistance in discharge planning

2.  Paroxysmal A-fib with RVR/HTN/HLD/aortic stenosis status post replacement/history of CVA/history of retinal artery occlusion
?Continue with his home blood pressure medications
? Continue with his home blood pressure medications, will monitor make adjustments as necessary
? Continue with his home cholesterol medications

3.  Anemia of chronic disease in setting of CKD 3
? Does have a history of polycystic kidney disease
? We will monitor renal function currently at baseline

4.  CÁRDENAS with a history of cirrhosis and previous liver cancer/GERD/chronic thrombocytopenia
? He does have stable ascites, will continue with his home medications
? Continue with his PPI

5.  Gout
? Stable
? Continue with his home allopurinol

6.  Anxiety/depression
? Stable
? Continue with home medications

DVT: Lovenox

Charges/Coding
Visit Charges
Inpatient E&M: 78820 Subs Hosp L2

## 2023-12-26 NOTE — CASEMGMT
Discharge Planning
Discharge orders, signed med list, covid results, and transport time sent to [facility] via [facility].  [facility] will transport patient by wheelchair at 4:30p.  Nursing, SW, patient, and his daughter updated.
María Hale, Discharge Planning Asst.

## 2023-12-26 NOTE — CASEMGMT
Discharge Planning 
Patient has been accepted by [facility].  SW updated.
María Hale, Discharge Planning Asst.

## 2024-02-07 ENCOUNTER — HOSPITAL ENCOUNTER (INPATIENT)
Dept: HOSPITAL 100 - ED | Age: 84
LOS: 8 days | Discharge: HOSPICE | DRG: 432 | End: 2024-02-15
Payer: MEDICARE

## 2024-02-07 VITALS
SYSTOLIC BLOOD PRESSURE: 114 MMHG | HEART RATE: 126 BPM | TEMPERATURE: 96.6 F | DIASTOLIC BLOOD PRESSURE: 88 MMHG | OXYGEN SATURATION: 95 % | RESPIRATION RATE: 13 BRPM

## 2024-02-07 VITALS
HEART RATE: 130 BPM | SYSTOLIC BLOOD PRESSURE: 142 MMHG | RESPIRATION RATE: 12 BRPM | OXYGEN SATURATION: 96 % | DIASTOLIC BLOOD PRESSURE: 109 MMHG

## 2024-02-07 VITALS
OXYGEN SATURATION: 95 % | SYSTOLIC BLOOD PRESSURE: 131 MMHG | DIASTOLIC BLOOD PRESSURE: 96 MMHG | HEART RATE: 121 BPM | RESPIRATION RATE: 22 BRPM | TEMPERATURE: 97.7 F

## 2024-02-07 VITALS
RESPIRATION RATE: 16 BRPM | DIASTOLIC BLOOD PRESSURE: 95 MMHG | SYSTOLIC BLOOD PRESSURE: 131 MMHG | OXYGEN SATURATION: 99 % | HEART RATE: 128 BPM

## 2024-02-07 VITALS
BODY MASS INDEX: 32.4 KG/M2 | BODY MASS INDEX: 32.5 KG/M2 | BODY MASS INDEX: 35.4 KG/M2 | BODY MASS INDEX: 35.9 KG/M2 | BODY MASS INDEX: 30.9 KG/M2 | BODY MASS INDEX: 36 KG/M2 | BODY MASS INDEX: 32.2 KG/M2 | BODY MASS INDEX: 32.3 KG/M2 | BODY MASS INDEX: 34.4 KG/M2

## 2024-02-07 VITALS — OXYGEN SATURATION: 93 %

## 2024-02-07 VITALS
DIASTOLIC BLOOD PRESSURE: 101 MMHG | TEMPERATURE: 97.2 F | RESPIRATION RATE: 16 BRPM | OXYGEN SATURATION: 94 % | SYSTOLIC BLOOD PRESSURE: 122 MMHG | HEART RATE: 84 BPM

## 2024-02-07 VITALS
SYSTOLIC BLOOD PRESSURE: 102 MMHG | DIASTOLIC BLOOD PRESSURE: 88 MMHG | OXYGEN SATURATION: 93 % | TEMPERATURE: 97.2 F | HEART RATE: 62 BPM | RESPIRATION RATE: 16 BRPM

## 2024-02-07 DIAGNOSIS — R33.9: ICD-10-CM

## 2024-02-07 DIAGNOSIS — F32.A: ICD-10-CM

## 2024-02-07 DIAGNOSIS — E87.6: ICD-10-CM

## 2024-02-07 DIAGNOSIS — I85.10: ICD-10-CM

## 2024-02-07 DIAGNOSIS — E78.5: ICD-10-CM

## 2024-02-07 DIAGNOSIS — I25.10: ICD-10-CM

## 2024-02-07 DIAGNOSIS — Z79.82: ICD-10-CM

## 2024-02-07 DIAGNOSIS — K44.9: ICD-10-CM

## 2024-02-07 DIAGNOSIS — I35.0: ICD-10-CM

## 2024-02-07 DIAGNOSIS — C22.8: ICD-10-CM

## 2024-02-07 DIAGNOSIS — K29.00: ICD-10-CM

## 2024-02-07 DIAGNOSIS — G93.41: ICD-10-CM

## 2024-02-07 DIAGNOSIS — R62.7: ICD-10-CM

## 2024-02-07 DIAGNOSIS — M10.9: ICD-10-CM

## 2024-02-07 DIAGNOSIS — I12.9: ICD-10-CM

## 2024-02-07 DIAGNOSIS — R47.81: ICD-10-CM

## 2024-02-07 DIAGNOSIS — R18.8: ICD-10-CM

## 2024-02-07 DIAGNOSIS — E83.52: ICD-10-CM

## 2024-02-07 DIAGNOSIS — Z95.2: ICD-10-CM

## 2024-02-07 DIAGNOSIS — F41.9: ICD-10-CM

## 2024-02-07 DIAGNOSIS — Z79.899: ICD-10-CM

## 2024-02-07 DIAGNOSIS — I69.354: ICD-10-CM

## 2024-02-07 DIAGNOSIS — D63.0: ICD-10-CM

## 2024-02-07 DIAGNOSIS — Z96.642: ICD-10-CM

## 2024-02-07 DIAGNOSIS — D61.818: ICD-10-CM

## 2024-02-07 DIAGNOSIS — K74.60: Primary | ICD-10-CM

## 2024-02-07 DIAGNOSIS — R13.12: ICD-10-CM

## 2024-02-07 DIAGNOSIS — G47.33: ICD-10-CM

## 2024-02-07 DIAGNOSIS — K21.9: ICD-10-CM

## 2024-02-07 DIAGNOSIS — I48.0: ICD-10-CM

## 2024-02-07 DIAGNOSIS — R54: ICD-10-CM

## 2024-02-07 DIAGNOSIS — Z66: ICD-10-CM

## 2024-02-07 LAB
ALANINE AMINOTRANSFER ALT/SGPT: 29 U/L (ref 16–61)
ALBUMIN SERPL-MCNC: 2.3 G/DL (ref 3.2–5)
ALKALINE PHOSPHATASE: 156 U/L (ref 45–117)
AMMONIA: 34 UMOL/L (ref 11–32)
ANION GAP: 1 (ref 5–15)
AST(SGOT): 36 U/L (ref 15–37)
BILIRUB DIRECT SERPL-MCNC: 0.3 MG/DL (ref 0–0.3)
BNP,B-TYPE NATRIURETIC PEPTIDE: 104.6 PG/ML (ref 0–100)
BUN SERPL-MCNC: 40 MG/DL (ref 7–18)
BUN/CREAT RATIO: 33.6 RATIO (ref 10–20)
CALCIUM SERPL-MCNC: 12.5 MG/DL (ref 8.5–10.1)
CARBON DIOXIDE: 29 MMOL/L (ref 21–32)
CHLORIDE: 110 MMOL/L (ref 98–107)
DEPRECATED RDW RBC: 61.5 FL (ref 35.1–43.9)
ERYTHROCYTE [DISTWIDTH] IN BLOOD: 16.9 % (ref 11.6–14.6)
EST GLOM FILT RATE - AFR AMER: 75 ML/MIN (ref 60–?)
ESTIMATED CREATININE CLEARANCE: 57.96 ML/MIN
GLOBULIN: 3.6 G/DL (ref 2.2–4.2)
GLUCOSE: 119 MG/DL (ref 74–106)
HCT VFR BLD AUTO: 32.7 % (ref 40–54)
HGB BLD-MCNC: 10.7 G/DL (ref 13–16.5)
IMMATURE GRANULOCYTES COUNT: 0.01 X10^3/UL (ref 0–0)
MCV RBC: 100.3 FL (ref 80–94)
MEAN CORP HGB CONC: 32.7 G/DL (ref 32–36)
MEAN PLATELET VOL.: 11.9 FL (ref 6.2–12)
NRBC FLAGGED BY ANALYZER: 0 % (ref 0–5)
PLATELET # BLD: 58 K/MM3 (ref 150–450)
POSITIVE COUNT: YES
POSITIVE DIFFERENTIAL: YES
POTASSIUM: 3.8 MMOL/L (ref 3.5–5.1)
RBC # BLD AUTO: 3.26 M/MM3 (ref 4.6–6.2)
TROPONIN-I HS: 24 PG/ML (ref 3–78)
WBC # BLD AUTO: 4.9 K/MM3 (ref 4.4–11)

## 2024-02-07 PROCEDURE — 89050 BODY FLUID CELL COUNT: CPT

## 2024-02-07 PROCEDURE — 36415 COLL VENOUS BLD VENIPUNCTURE: CPT

## 2024-02-07 PROCEDURE — 94668 MNPJ CHEST WALL SBSQ: CPT

## 2024-02-07 PROCEDURE — 87641 MR-STAPH DNA AMP PROBE: CPT

## 2024-02-07 PROCEDURE — 87506 IADNA-DNA/RNA PROBE TQ 6-11: CPT

## 2024-02-07 PROCEDURE — 83880 ASSAY OF NATRIURETIC PEPTIDE: CPT

## 2024-02-07 PROCEDURE — 87186 SC STD MICRODIL/AGAR DIL: CPT

## 2024-02-07 PROCEDURE — 94640 AIRWAY INHALATION TREATMENT: CPT

## 2024-02-07 PROCEDURE — 97166 OT EVAL MOD COMPLEX 45 MIN: CPT

## 2024-02-07 PROCEDURE — 87205 SMEAR GRAM STAIN: CPT

## 2024-02-07 PROCEDURE — 97162 PT EVAL MOD COMPLEX 30 MIN: CPT

## 2024-02-07 PROCEDURE — 88305 TISSUE EXAM BY PATHOLOGIST: CPT

## 2024-02-07 PROCEDURE — 70498 CT ANGIOGRAPHY NECK: CPT

## 2024-02-07 PROCEDURE — 49083 ABD PARACENTESIS W/IMAGING: CPT

## 2024-02-07 PROCEDURE — 71045 X-RAY EXAM CHEST 1 VIEW: CPT

## 2024-02-07 PROCEDURE — 97530 THERAPEUTIC ACTIVITIES: CPT

## 2024-02-07 PROCEDURE — 93306 TTE W/DOPPLER COMPLETE: CPT

## 2024-02-07 PROCEDURE — 84443 ASSAY THYROID STIM HORMONE: CPT

## 2024-02-07 PROCEDURE — 83615 LACTATE (LD) (LDH) ENZYME: CPT

## 2024-02-07 PROCEDURE — 84157 ASSAY OF PROTEIN OTHER: CPT

## 2024-02-07 PROCEDURE — 80053 COMPREHEN METABOLIC PANEL: CPT

## 2024-02-07 PROCEDURE — 80061 LIPID PANEL: CPT

## 2024-02-07 PROCEDURE — 92611 MOTION FLUOROSCOPY/SWALLOW: CPT

## 2024-02-07 PROCEDURE — 85025 COMPLETE CBC W/AUTO DIFF WBC: CPT

## 2024-02-07 PROCEDURE — 80069 RENAL FUNCTION PANEL: CPT

## 2024-02-07 PROCEDURE — C8929 TTE W OR WO FOL WCON,DOPPLER: HCPCS

## 2024-02-07 PROCEDURE — 87631 RESP VIRUS 3-5 TARGETS: CPT

## 2024-02-07 PROCEDURE — 87449 NOS EACH ORGANISM AG IA: CPT

## 2024-02-07 PROCEDURE — 80048 BASIC METABOLIC PNL TOTAL CA: CPT

## 2024-02-07 PROCEDURE — 82945 GLUCOSE OTHER FLUID: CPT

## 2024-02-07 PROCEDURE — 82330 ASSAY OF CALCIUM: CPT

## 2024-02-07 PROCEDURE — 87493 C DIFF AMPLIFIED PROBE: CPT

## 2024-02-07 PROCEDURE — 82140 ASSAY OF AMMONIA: CPT

## 2024-02-07 PROCEDURE — 88313 SPECIAL STAINS GROUP 2: CPT

## 2024-02-07 PROCEDURE — 70450 CT HEAD/BRAIN W/O DYE: CPT

## 2024-02-07 PROCEDURE — 82652 VIT D 1 25-DIHYDROXY: CPT

## 2024-02-07 PROCEDURE — 93005 ELECTROCARDIOGRAM TRACING: CPT

## 2024-02-07 PROCEDURE — 84145 PROCALCITONIN (PCT): CPT

## 2024-02-07 PROCEDURE — 84484 ASSAY OF TROPONIN QUANT: CPT

## 2024-02-07 PROCEDURE — 74018 RADEX ABDOMEN 1 VIEW: CPT

## 2024-02-07 PROCEDURE — 88108 CYTOPATH CONCENTRATE TECH: CPT

## 2024-02-07 PROCEDURE — 85730 THROMBOPLASTIN TIME PARTIAL: CPT

## 2024-02-07 PROCEDURE — 76705 ECHO EXAM OF ABDOMEN: CPT

## 2024-02-07 PROCEDURE — 80076 HEPATIC FUNCTION PANEL: CPT

## 2024-02-07 PROCEDURE — 85610 PROTHROMBIN TIME: CPT

## 2024-02-07 PROCEDURE — 92507 TX SP LANG VOICE COMM INDIV: CPT

## 2024-02-07 PROCEDURE — 92526 ORAL FUNCTION THERAPY: CPT

## 2024-02-07 PROCEDURE — P9047 ALBUMIN (HUMAN), 25%, 50ML: HCPCS

## 2024-02-07 PROCEDURE — 92610 EVALUATE SWALLOWING FUNCTION: CPT

## 2024-02-07 PROCEDURE — 95819 EEG AWAKE AND ASLEEP: CPT

## 2024-02-07 PROCEDURE — 83970 ASSAY OF PARATHORMONE: CPT

## 2024-02-07 PROCEDURE — 70551 MRI BRAIN STEM W/O DYE: CPT

## 2024-02-07 PROCEDURE — 97535 SELF CARE MNGMENT TRAINING: CPT

## 2024-02-07 PROCEDURE — 94762 N-INVAS EAR/PLS OXIMTRY CONT: CPT

## 2024-02-07 PROCEDURE — 83735 ASSAY OF MAGNESIUM: CPT

## 2024-02-07 PROCEDURE — 74177 CT ABD & PELVIS W/CONTRAST: CPT

## 2024-02-07 PROCEDURE — A4216 STERILE WATER/SALINE, 10 ML: HCPCS

## 2024-02-07 PROCEDURE — 87070 CULTURE OTHR SPECIMN AEROBIC: CPT

## 2024-02-07 PROCEDURE — 97110 THERAPEUTIC EXERCISES: CPT

## 2024-02-07 PROCEDURE — 70496 CT ANGIOGRAPHY HEAD: CPT

## 2024-02-07 PROCEDURE — 87077 CULTURE AEROBIC IDENTIFY: CPT

## 2024-02-07 PROCEDURE — 74230 X-RAY XM SWLNG FUNCJ C+: CPT

## 2024-02-07 PROCEDURE — 99285 EMERGENCY DEPT VISIT HI MDM: CPT

## 2024-02-07 PROCEDURE — 82306 VITAMIN D 25 HYDROXY: CPT

## 2024-02-07 PROCEDURE — 81001 URINALYSIS AUTO W/SCOPE: CPT

## 2024-02-07 RX ADMIN — SODIUM CHLORIDE 50 ML: 9 INJECTION, SOLUTION INTRAVENOUS at 20:55

## 2024-02-07 RX ADMIN — SODIUM CHLORIDE 1000 ML: 9 INJECTION, SOLUTION INTRAVENOUS at 15:13

## 2024-02-07 RX ADMIN — SODIUM CHLORIDE, PRESERVATIVE FREE 0 ML: 5 INJECTION INTRAVENOUS at 20:55

## 2024-02-07 NOTE — HP.PCM.HOS_ITS
HPI - General    
General    
Date of Admission: 02/07/24    
Date of Service: 02/07/24    
Chief Complaint: LLE weakness and change in mental status    
HPI Narrative    
NENA MADRIGAL, is a 84-year-old male history of cirrhosis, liver cancer, GERD,   
pafib, anemia, gout CVA presented to [facility] ED 2/7/2024 from  
assisted living for altered mental status.  Reportedly he has not been himself   
since Sunday and at that time had been having some difficulty talking and at   
times slurred speech.  No recent falls but daughter noted some contracture of   
right fingers on right hand but was able to open hand in ED without significant   
difficulty.  Has been having difficulty using his left leg however.  Family was   
concerned for stroke with patient due to his left leg weakness and not being   
able to talk for a period of time earlier.  CT brain in ED negative, ammonia 34,  
patiently mildly tachycardic.  Lab workup otherwise similar to patient's baselin  
e/fairly benign.  Given there is still concern patient may have had recent   
stroke hospitalist contacted for admission for stroke rule out.  Patient   
evaluated at bedside with family present who provided most of the history.    
Reportedly patient has been at [facility] and had been doing fairly well and was a  
1 assist and his daughter saw him on Thursday and he was not having any symptoms  
or changes in mental status, she saw them on Sunday and said he was slow to   
answer and thought his speech may be slightly slurred but he was evaluated and   
it did not seem that he had a stroke so plan was to proceed with him being   
transferred to [facility].  When he was evaluated there today he   
was not using his left leg at all and required multiple people to assist him   
which prompted patient coming to the ED.  Reportedly his left leg chronically   
does not work as well as his right but he usually is able to use it somewhat and  
this level of dysfunction is not common for him.  Also in ED complaining of some  
left-sided rib pain, was here in December with pneumonia and has had some cough   
residual since that time.  Additional concern that patient has his hands   
clenched and holds them like that.  Family at bedside report patient does seem   
slightly better but he is not back to baseline.    
    
    
Critical access hospital    
Medical History (Updated 02/07/24 @ 17:47 by Dr. Pebbles Schwartz MD)    
    
Anemia    
Anemia in chronic kidney disease    
Anemia of chronic disease    
Aortic valve disorder    
Asthma    
Body mass index (BMI) 40.0-44.9, adult    
Chronic acquired lymphedema    
CKD (chronic kidney disease), stage III    
Depression    
Essential (primary) hypertension    
GERD (gastroesophageal reflux disease)    
Gout    
History of cerebrovascular accident    
History of splenomegaly    
Hyperlipemia    
Liver cancer    
Morbid obesity    
Multiple lung nodules    
New onset atrial flutter    
Non-alcoholic cirrhosis    
Nonobstructive atherosclerosis of coronary artery    
Nonrheumatic aortic (valve) stenosis    
OAB (overactive bladder)    
Obesity    
Obesity (BMI 30-39.9)    
CAREN treated with BiPAP    
Osteoarthritis    
Paroxysmal A-fib    
PCK (polycystic kidney disease)    
Peripheral neuropathy    
Polycystic kidney disease    
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    
Right bundle branch block (RBBB)    
Splenomegaly    
Thrombocytopenia    
Thrombocytopenia    
Urge incontinence    
    
    
                                Home Medications    
    
allopurinol 300 mg tablet 300 mg PO DAILY gout 12/27/16 [History Last Taken   
12/22/19]    
fenofibrate nanocrystallized 48 mg tablet 48 mg PO DAILY cholesterol 12/27/16   
[History Last Taken 12/22/19]    
magnesium oxide 400 mg (241.3 mg magnesium) tablet 400 mg PO DAILY supplement   
12/27/16 [History Last Taken 12/22/19]    
multivit-min-folic acid 0.4 mg-lycopene 300 mcg-lutein 250 mcg tablet 1 ea PO   
DAILY vitamin 12/27/16 [History Last Taken 12/22/19]    
sertraline 100 mg tablet 50 mg PO QHS depression 05/21/20 [History Last Taken   
Unknown]    
aspirin 81 mg tablet,delayed release (Adult Aspirin Regimen) 81 mg PO DAILY   
07/27/21 [History Last Taken Unknown]    
ferrous sulfate 325 mg (65 mg iron) tablet 325 mg PO BID 07/27/21 [History Last   
Taken Unknown]    
albuterol sulfate 90 mcg/actuation aerosol inhaler 2 puff inhalation Q6H PRN   
ASTHMA 02/08/22 [History Last Taken Unknown]    
fluticasone propionate 50 mcg/actuation nasal spray,suspension 1 spray   
intranasal DAILY 02/08/22 [History Last Taken Unknown]    
omeprazole 40 mg capsule,delayed release 40 mg PO DAILY 02/08/22 [History Last   
Taken Unknown]    
oxybutynin chloride 15 mg tablet,extended release 24 hr 30 mg PO DAILY 02/08/22   
[History Last Taken Unknown]    
ascorbic acid (vitamin C) 500 mg capsule 500 mg PO DAILY 01/31/23 [History Last   
Taken Unknown]    
furosemide 20 mg tablet 20 mg PO Q OTHER DAY 01/31/23 [History Last Taken   
Unknown]    
nitroglycerin 0.4 mg sublingual tablet 0.4 mg sublingual Q5M PRN chest pain   
01/31/23 [History Last Taken Unknown]    
lidocaine 5 % topical patch (Lidoderm) 1 patch topical DAILY #15 ea 05/28/23 [Rx  
 Last Taken Unknown]    
cyanocobalamin (vitamin B-12) 5,000 mcg capsule 2,500 mcg PO DAILY 07/15/23   
[History Last Taken Unknown]    
rifaximin 550 mg tablet (Xifaxan) 550 mg PO BID 07/15/23 [History Last Taken   
Unknown]    
diltiazem HCl 120 mg capsule,extended release 24 hr 120 mg PO DAILY #30 caps   
08/23/23 [Rx Last Taken Unknown]    
metoprolol tartrate 25 mg tablet 25 mg PO BID #60 tabs 08/23/23 [Rx Last Taken   
Unknown]    
fluticasone furoate 200 mcg-vilanterol 25 mcg/dose inhalation powder (Breo   
Ellipta) 1 inh inhalation DAILY 12/21/23 [History Last Taken Unknown]    
cefdinir 300 mg capsule 300 mg PO BID 4 days #8 caps 12/26/23 [Rx Last Taken   
Unknown]    
ferrous sulfate 325 mg (65 mg iron) tablet,delayed release mg PO 02/07/24   
[History Last Taken Unknown]    
    
    
                                            
    
    
    
Allergy/AdvReac Type Severity Reaction Status Date / Time    
     
hydrocodone [From Vicodin] Allergy  Other Verified 02/07/24 14:53    
     
strawberry Allergy  Hives Verified 02/07/24 14:53    
     
venom-honey bee Allergy  Anaphylaxis Verified 02/07/24 14:53    
    
[bee venom (honey bee)]         
     
adhesive tape AdvReac  Rash Verified 02/07/24 14:53    
     
atorvastatin AdvReac  PT UNSURE Verified 02/07/24 14:53    
    
   OF REACTION      
     
montelukast AdvReac  PT UNSURE Verified 02/07/24 14:53    
    
   OF REACTION      
    
    
    
Family History (Reviewed 02/07/24 @ 16:45 by Dr. Ritchie Fernando, )    
Father   CAD (coronary artery disease)    
Brother   Diabetes    
Mother   Heart disease    
    
    
Surgical History (Reviewed 02/07/24 @ 16:45 by Dr. Ritchie Fernando, DO)    
    
Aortic valve replaced    
History of aortic valve replacement with bioprosthetic valve (10/04/11)    
History of hip surgery (05/2020)    
History of knee replacement    
History of left heart catheterization (09/21/11)    
History of tonsillectomy    
History of total left hip arthroplasty    
History of total left hip replacement    
History of umbilical hernia repair    
    
    
Social History (Reviewed 02/07/24 @ 16:45 by Dr. Ritchie Fernando, DO)    
household members:  none     
Smoking Status:  Never smoker     
alcohol intake:  never     
substance use type:  does not use     
    
    
    
ROS    
ROS Narrative    
General: Denies fever/chills    
HENT: Denies headache, denies stuffy nose, denies sore throat    
EYES: Denies changes in vision    
Resp: Has had persistent cough, possibly some shortness of breath    
Cardiac: Denies chest pain    
GI: Denies abdominal pain, denies changes in bowel, denies nausea/vomiting    
: Denies changes in urination    
Extremity: Chronic lower extremity swelling    
MSK: Chronic left lower extremity weakness but more so than usual and more so   
than right side    
Neuro: Denies any numbness/tingling    
Heme: Denies any bleeding or bruising    
Skin: Denies rashes    
Psychiatric: No complaints voiced    
    
Vital Signs    
Vital Signs    
Vital Signs:     
                                            
    
    
    
 02/07/24    
14:47 02/07/24    
14:53 02/07/24    
16:18    
     
Temperature 96.6 F L      
     
Temperature Source Temporal      
     
Pulse Rate 126 H  128 H    
     
Respiratory Rate 13  16    
     
Respiratory Effort  Normal     
     
Respiratory Pattern  Normal     
     
Blood Pressure 114/88 H  131/95 H    
     
Blood Pressure Mean 96  107    
     
Pulse Ox 95  99    
     
Oxygen Delivery Method Room Air  Room Air    
    
    
    
    
 02/07/24    
17:04    
     
Temperature     
     
Temperature Source     
     
Pulse Rate 130 H    
     
Respiratory Rate 12    
     
Respiratory Effort     
     
Respiratory Pattern     
     
Blood Pressure 142/109 H    
     
Blood Pressure Mean 120    
     
Pulse Ox 96    
     
Oxygen Delivery Method Room Air    
    
    
                                     Weight    
    
    
    
Weight:                        112.2 kg                                           
    
     
Body Mass Index (BMI)          35.4                                               
    
    
    
    
    
    
Physical Exam    
Narrative    
General: Alert, no apparent distress    
HEENT: Atraumatic, poor dentition    
Eyes: Anicteric, normal conjunctiva, extraocular movements intact, pupils equal    
Neck: Supple    
Respiratory: Transmitted upper airway sounds, somewhat diminished at the bases,   
normal respiratory effort    
Cardiovascular: Regular rhythm but tachycardic    
GI: Soft, nontender, no rebound, guarding, rigidity    
Extremities: Trace lower extremity edema with compression hose    
Musculoskeletal: Strength 5 out of 5 in right upper extremity, 5 out of 5 left   
upper extremity, 3 out of 5 right lower extremity, 1 out of 5 left lower   
extremity, reports pain under left armpit but this was not reproducible on my   
exam    
Neuro: No overt focal neurological deficits, patient does have both hands and   
fists and slightly contracted but opens them up on prompting    
Skin: No rashes appreciated    
Psych: Cooperative    
    
Results    
Lab / Micro Data    
    
                                                        02/07/24 15:15              
    
                                                        02/07/24 15:15              
    
Labs:     
                         Laboratory Results - last 24 hr    
    
02/07/24 15:15: WBC 4.9, RBC 3.26 L, Hgb 10.7 L, Hct 32.7 L, .3 H, MCH   
32.8 H, MCHC 32.7, RDW Std Deviation 61.5 H, RDW Coeff of Ilia 16.9 H, Plt Count   
58 L, MPV 11.9, Immature Gran % (Auto) 0.200, Neut % (Auto) 87.8 H, Lymph %   
(Auto) 5.1 L, Mono % (Auto) 5.9, Eos % (Auto) 0.8, Baso % (Auto) 0.2, Absolute   
Neuts (auto) 4.3, Absolute Lymphs (auto) 0.25 L, Nucleated RBC % 0, Sodium 140,   
Potassium 3.8, Chloride 110 H, Carbon Dioxide 29.0, Anion Gap 1 L, BUN 40 H,   
Creatinine 1.19, Estim Creat Clear Calc 57.96, Est GFR (MDRD) Af Amer 75, Est   
GFR (MDRD) Non-Af 62, BUN/Creatinine Ratio 33.6 H, Glucose 119 H, Calcium 12.5 H  
, Total Bilirubin 0.80, Direct Bilirubin 0.30, AST 36, ALT 29, Alkaline   
Phosphatase 156 H, Ammonia 34.0 H, Troponin I High Sens 24, B-Natriuretic   
Peptide 104.6 H, Total Protein 5.9 L, Albumin 2.3 L, Globulin 3.6,   
Albumin/Globulin Ratio 0.6 L    
    
    
Micro:     
                                  Microbiology    
    
02/07/24 15:15   Mucosa - Nose   SARS-CoV-2, Influenza & RSV (PCR) - Final    
    
    
Imaging    
                              Radiology Impression    
    
Chest X-Ray  02/07/24 15:22    
IMPRESSION:    
Blunting of the right costophrenic angle with bibasilar atelectasis and/or    
infiltrates as well as increased markings in the right upper lobe abutting    
the right minor fissure. Follow-up recommended.    
     
Electronically Signed:    
Tay Mclain MD    
2024/02/07 at 15:35 EST    
Reading Location ID and State: [address] / [place]    
Tel [phone], Service support  [phone], Fax [phone]    
     
    
    
Brain CT  02/07/24 15:25    
IMPRESSION:    
Chronic involutional changes of the brain.    
     
Electronically Signed:    
Tay Mclain MD    
2024/02/07 at 15:43 EST    
Reading Location ID and State: [address] / [place]    
Tel [phone], Service support  [phone], Fax [phone]    
     
    
    
    
    
Assessment & Plan    
Assessment/Plan    
(1) Neurologic abnormality:     
(2) Gout:     
(3) GERD (gastroesophageal reflux disease):     
(4) Anemia of chronic disease:     
(5) Cirrhosis:     
(6) Obstructive sleep apnea on CPAP:     
PLAN:     
Plan    
#AMS and weakness, concern for CVA    
-Left lower extremity weakness at baseline but extremity almost nonfunctional   
and patient's speech and responsiveness not back to baseline    
-Admit to tele    
-CT head w/ chronic changes in ED    
-CTA head and neck ordered    
-MRI ordered    
-NIH q4hr    
-Listed allergy to statin, pt w/ thrombocytopenia so pt not on antiplatelet or   
AC    
-Given suspicion is not significantly high for CVA as other etiology may be   
because we will hold on empiric antiplatelet due to risk of bleeding and adverse  
events with his thrombocytopenia    
-Echo w/ bubble study    
-PT/OT/Speech eval    
-Hold BP medications to allow for permissive hypertension for 24 hours unless   
SBP greater than 220 or DBP greater than 120 or until stroke is ruled out    
-Ammonia minimally elevated    
-Awaiting UA as patient could have UTI contributing    
    
# Hypercalcemia    
-Could be contributor to weakness and altered mental status    
-?Secondary to malignancy    
-Tends to be elevated though has been slightly higher recently than previous    
-Very gentle IV fluids given patient's propensity for fluid overload, can treat   
more aggressively pending treatment response and findings    
    
# Pancytopenia    
-Patient routinely pancytopenic    
-White blood cell count 4.9, lower limits of normal today though usually is   
below the 4.4 threshold    
-Appears to be at baseline    
-Follow-up with oncology on outpatient basis    
    
#Liver cancer and cirrhosis    
-Follows with Dr. Ortega    
-Has follow-up MRI Friday to reevaluate his liver    
    
#pafib    
-Admitted to [facility] in December and was found to have   
paroxysmal A-fib with RVR    
-On rate control but not on anticoagulation    
-Cardiology had evaluated and patient was back in normal sinus rhythm and given   
fall risk they were hesitant to start anticoagulation    
-Continue rate control    
    
#RV dysfunction/AV bioprosthetic valve    
-Echo 7/2023 with EF 60% and indeterminate diastolic function however patient   
had severely dilated right ventricle with global right ventricular dysfunction   
and severe mitral annular calcification with RVSP of 44    
-Daily weights, I's and O's    
-Giving very gentle hydration    
    
# Depression and anxiety    
-Continue sertraline    
    
#GERD    
-Continue PPI    
    
#Gout    
-Continue allppurinol    
    
#CAREN    
-Continue bipap    
    
#DVT ppx: SCDs    
    
Pebbles Schwartz MD    
    
    
Charges/Coding    
Visit Charges    
Inpatient E&M: 38415 Init Hosp L2

## 2024-02-07 NOTE — MRI_ITS
REPORT-ID:[id number] 
 
STUDY:   MRI BRAIN WITHOUT CONTRAST 
 
REASON FOR EXAM:   Male, 84 years old.  cva r/o 
 
TECHNIQUE:   Standardized multiplanar fat and water weighted pulse 
sequences were obtained. 
 
COMPARISON:   Noncontrast CT brain and CTA brain from today. 
___________________________________ 
 
FINDINGS: 
There is moderate cerebral atrophy with widening of the extra-axial spaces 
and ventricular dilatation.  Normal white matter tracts of the 
supratentorial brain.  There is no evidence for recent intracranial 
ischemia or other cause of cytotoxic edema on diffusion weighted imaging 
(DWI). 
 
Normal bilateral basal ganglia.  Normal thalami.  There is no extra-axial 
fluid accumulation. 
 
Normal flow voids within the major intracranial circulation suggesting 
patency by spin echo criteria. 
 
Normal sella turcica, pituitary gland, infundibular stalk, optic chiasm and 
hypothalamus.  Normal tectal plate and pineal gland. 
 
Normal midbrain, nikko and medulla.  Normal cerebellum.  Normal basal 
cisterns.  Normal bilateral temporal bones.  Normal bilateral internal 
auditory canals. 
 
No demonstrated orbital abnormality, within the constraints of a routine 
brain study.  Normal visualized paranasal sinuses.  Normal calvarium and 
skull base.  Normal visualized soft tissue structures.  Normal visualized 
upper cervical spine. 
___________________________________ 
 
ORDER #: [id number] MRI/Brain without Contrast  
IMPRESSION:  
Involutional changes of the brain, as described above.  
 
  
Electronically Signed:  
Marin Ma MD  
2024/02/07 at 21:02 EST  
Reading Location ID and State: [address] / [place]  
Tel [phone], Service support  [phone], Fax [phone]

## 2024-02-07 NOTE — RAD_ITS
REPORT-ID:[id number] 
 
STUDY:   X-RAY CHEST 
 
REASON FOR EXAM:   Male, 84 years old.  Weakness 
 
TECHNIQUE:   Single AP portable view of the chest. 
 
COMPARISON:   Comparison is made with prior study dated December 21, 2023. 
___________________________________ 
 
FINDINGS: 
EKG electrodes are seen. 
 
There is elevation of the right hemidiaphragm with blunting of the right 
costophrenic angle. Increased markings at both lung bases as well as in the 
right upper lobe abutting the right minor fissure. Follow-up recommended. 
 
Sternal cerclage wires and vascular clips are present from a prior 
sternotomy and coronary artery bypass graft procedure (CABG).   Normal 
mediastinum and daly.  Normal visualized pulmonary arteries.  There is 
atherosclerotic calcification of the aortic arch with tortuosity. 
 
Normal visualized thoracic spine.  Normal visualized ribs, clavicles, and 
shoulders. 
 
There is no demonstrated abnormality of the visualized soft tissue 
structures of the upper abdomen. 
___________________________________ 
 
ORDER #: [id number] RAD/Chest 1 View (Portable)  
IMPRESSION:  
Blunting of the right costophrenic angle with bibasilar atelectasis and/or  
infiltrates as well as increased markings in the right upper lobe abutting  
the right minor fissure. Follow-up recommended.  
 
  
Electronically Signed:  
Tay Mclain MD  
2024/02/07 at 15:35 EST  
Reading Location ID and State: [address] / [place]  
Tel [phone], Service support  [phone], Fax [phone]

## 2024-02-07 NOTE — EDS_ITS
HPI    
History of Present Illness    
Chief Complaint: Weakness    
Narrative    
Narrative:     
84-year-old male presenting for evaluation of altered mental status.  Patient is  
not able to give history.  Patient has a history of cirrhosis, liver cancer,   
GERD, anemia, CVA currently in assisted living.  His daughter gives most of the   
history.  She states that he just had a PT/OT evaluation and is going to go to   
the [facility] since Sunday has not been himself.  She reports that Sunday   
he started to have having difficulty talking and at times slurred speech.  She   
states that she asked him about going to [employer] to get a frosty and had several  
conversations after that and then later she states he replied yes to her   
question about her prostate.  She estimates she is about 20 minutes.  Patient   
with no history of falls at the nursing.  Daughter states that he in addition to  
this has had some contracture of the right fingers of the right hand.  Although   
when I ask him to he can open his hand and it does not appear to be in   
contracture.  Patient states she noted that his left leg was not usable when she  
tried to transfer him from bed to car and vice versa.  She states she was told   
that he was a 1 person assist but is required 2 people because he cannot use his  
left leg.    
    
[facility]    
Medical History (Updated 02/07/24 @ 17:47 by Dr. Pebbles Schwartz MD)    
    
Anemia    
Anemia in chronic kidney disease    
Anemia of chronic disease    
Aortic valve disorder    
Asthma    
Body mass index (BMI) 40.0-44.9, adult    
Chronic acquired lymphedema    
CKD (chronic kidney disease), stage III    
Depression    
Essential (primary) hypertension    
GERD (gastroesophageal reflux disease)    
Gout    
History of cerebrovascular accident    
History of splenomegaly    
Hyperlipemia    
Liver cancer    
Morbid obesity    
Multiple lung nodules    
New onset atrial flutter    
Non-alcoholic cirrhosis    
Nonobstructive atherosclerosis of coronary artery    
Nonrheumatic aortic (valve) stenosis    
OAB (overactive bladder)    
Obesity    
Obesity (BMI 30-39.9)    
CAREN treated with BiPAP    
Osteoarthritis    
Paroxysmal A-fib    
PCK (polycystic kidney disease)    
Peripheral neuropathy    
Polycystic kidney disease    
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    
Right bundle branch block (RBBB)    
Splenomegaly    
Thrombocytopenia    
Thrombocytopenia    
Urge incontinence    
    
    
                                Home Medications    
    
allopurinol 300 mg tablet 300 mg PO DAILY gout 12/27/16 [History Last Taken   
12/22/19]    
fenofibrate nanocrystallized 48 mg tablet 48 mg PO DAILY cholesterol 12/27/16   
[History Last Taken 12/22/19]    
magnesium oxide 400 mg (241.3 mg magnesium) tablet 400 mg PO DAILY supplement   
12/27/16 [History Last Taken 12/22/19]    
multivit-min-folic acid 0.4 mg-lycopene 300 mcg-lutein 250 mcg tablet 1 ea PO   
DAILY vitamin 12/27/16 [History Last Taken 12/22/19]    
sertraline 100 mg tablet 50 mg PO QHS depression 05/21/20 [History Last Taken   
Unknown]    
aspirin 81 mg tablet,delayed release (Adult Aspirin Regimen) 81 mg PO DAILY   
07/27/21 [History Last Taken Unknown]    
ferrous sulfate 325 mg (65 mg iron) tablet 325 mg PO BID 07/27/21 [History Last   
Taken Unknown]    
albuterol sulfate 90 mcg/actuation aerosol inhaler 2 puff inhalation Q6H PRN   
ASTHMA 02/08/22 [History Last Taken Unknown]    
fluticasone propionate 50 mcg/actuation nasal spray,suspension 1 spray   
intranasal DAILY 02/08/22 [History Last Taken Unknown]    
omeprazole 40 mg capsule,delayed release 40 mg PO DAILY 02/08/22 [History Last   
Taken Unknown]    
oxybutynin chloride 15 mg tablet,extended release 24 hr 30 mg PO DAILY 02/08/22   
[History Last Taken Unknown]    
ascorbic acid (vitamin C) 500 mg capsule 500 mg PO DAILY 01/31/23 [History Last   
Taken Unknown]    
furosemide 20 mg tablet 20 mg PO Q OTHER DAY 01/31/23 [History Last Taken   
Unknown]    
nitroglycerin 0.4 mg sublingual tablet 0.4 mg sublingual Q5M PRN chest pain   
01/31/23 [History Last Taken Unknown]    
lidocaine 5 % topical patch (Lidoderm) 1 patch topical DAILY #15 ea 05/28/23 [Rx  
 Last Taken Unknown]    
cyanocobalamin (vitamin B-12) 5,000 mcg capsule 2,500 mcg PO DAILY 07/15/23   
[History Last Taken Unknown]    
rifaximin 550 mg tablet (Xifaxan) 550 mg PO BID 07/15/23 [History Last Taken   
Unknown]    
diltiazem HCl 120 mg capsule,extended release 24 hr 120 mg PO DAILY #30 caps   
08/23/23 [Rx Last Taken Unknown]    
metoprolol tartrate 25 mg tablet 25 mg PO BID #60 tabs 08/23/23 [Rx Last Taken   
Unknown]    
fluticasone furoate 200 mcg-vilanterol 25 mcg/dose inhalation powder (Breo   
Ellipta) 1 inh inhalation DAILY 12/21/23 [History Last Taken Unknown]    
cefdinir 300 mg capsule 300 mg PO BID 4 days #8 caps 12/26/23 [Rx Last Taken   
Unknown]    
ferrous sulfate 325 mg (65 mg iron) tablet,delayed release mg PO 02/07/24   
[History Last Taken Unknown]    
    
    
                                            
    
    
    
Allergy/AdvReac Type Severity Reaction Status Date / Time    
     
hydrocodone [From Vicodin] Allergy  Other Verified 02/07/24 14:53    
     
strawberry Allergy  Hives Verified 02/07/24 14:53    
     
venom-honey bee Allergy  Anaphylaxis Verified 02/07/24 14:53    
    
[bee venom (honey bee)]         
     
adhesive tape AdvReac  Rash Verified 02/07/24 14:53    
     
atorvastatin AdvReac  PT UNSURE Verified 02/07/24 14:53    
    
   OF REACTION      
     
montelukast AdvReac  PT UNSURE Verified 02/07/24 14:53    
    
   OF REACTION      
    
    
    
Family History (Reviewed 02/07/24 @ 16:45 by Dr. Ritchie Fernando, DO)    
Father   CAD (coronary artery disease)    
Brother   Diabetes    
Mother   Heart disease    
    
    
Surgical History (Reviewed 02/07/24 @ 16:45 by Dr. Ritchie Fernando, DO)    
    
Aortic valve replaced    
History of aortic valve replacement with bioprosthetic valve (10/04/11)    
History of hip surgery (05/2020)    
History of knee replacement    
History of left heart catheterization (09/21/11)    
History of tonsillectomy    
History of total left hip arthroplasty    
History of total left hip replacement    
History of umbilical hernia repair    
    
    
Social History (Reviewed 02/07/24 @ 16:45 by Dr. Ritchie Fernando, )    
household members:  none     
Smoking Status:  Never smoker     
alcohol intake:  never     
substance use type:  does not use     
    
    
    
ROS    
ROS ED    
Review of Systems    
ROS Unobtainable: due to mental condition and due to mental status    
    
EXAM    
Physical Exam    
Const    
Vital Signs:     
    
                                            
    
    
    
 02/07/24    
14:47 02/07/24    
14:53 02/07/24    
16:18    
     
Temperature 96.6 F L      
     
Temperature Source Temporal      
     
Pulse Rate 126 H  128 H    
     
Respiratory Rate 13  16    
     
Respiratory Effort  Normal     
     
Respiratory Pattern  Normal     
     
Blood Pressure 114/88 H  131/95 H    
     
Blood Pressure Mean 96  107    
     
Pulse Ox 95  99    
     
Oxygen Delivery Method Room Air  Room Air    
    
    
    
    
 02/07/24    
17:04    
     
Temperature     
     
Temperature Source     
     
Pulse Rate 130 H    
     
Respiratory Rate 12    
     
Respiratory Effort     
     
Respiratory Pattern     
     
Blood Pressure 142/109 H    
     
Blood Pressure Mean 120    
     
Pulse Ox 96    
     
Oxygen Delivery Method Room Air    
    
    
    
    
Positive well nourished    
General Appearance ED: NAD; Negative for pallor    
HEENT    
Reports moist mucous membranes    
Eyes    
PERRL and EOMs intact bilaterally    
General Eye ED: Negative for pale conjunctiva    
Resp    
normal respiratory effort and clear to auscultation bilaterally    
Auscultation: Negative for rales, rhonchi or wheezes    
Cardio    
regular rate and regular rhythm    
GI    
normal to inspection, nondistended, normoactive bowel sounds    
Extremity    
normal to inspection    
Neuro    
oriented x3 and CN's II-XII intact bilaterally    
Sensorium / Orientation: alert    
Psych    
mental status grossly normal    
Skin    
no rashes or lesions noted    
General Skin Exam: Negative for jaundice or pallor    
    
MDM    
MDM    
MDM Narrative    
Medical decision making narrative:     
Patient presenting with weakness.  Initially had no family with him.  He was   
unable to give a history.  Report was that they were concerned for stroke as the  
patient was not able to talk for short time.  This is the only history I had   
before his family arrived.  Patient is awake and alert and in no distress.    
Slightly tachycardic but otherwise well-appearing.  He is not able to answer   
many questions.  Reviewed the medical history.  Differential includes stroke,   
intracranial hemorrhage, spine fracture, compression, dehydration, anemia,   
electrolyte abnormality, ACS, CHF, COVID, influenza, RSV, UTI, dysrhythmia,   
hepatic encephalopathy.  CBC will be obtained to assess with blood cell count,   
hemoglobin, platelets.  CMP to assess liver function, renal function,   
electrolytes, glucose.  High-sensitivity troponin and EKG to assess for   
ischemia.  BNP to assess for CHF.  Ammonia to assess for hyperammonemia.  Chest   
pain rule out pneumonia or CHF.  COVID, influenza, RSV will be obtained.    
Patient was given 500 cc of normal saline.  CBC shows normal white blood cell   
count of 4.9.  Hemoglobin 10.7 at baseline.  Platelets low at 58 also at   
baseline..  Creatinine 1.19.  Electrolytes are normal.  LFTs unremarkable.    
High-sensitivity troponin is 24.  .6.  EKG on my interpretation shows   
sinus tachycardia at a ventricular rate of 129 bpm.  Chest x-ray shows blunting   
of the right costophrenic angle on my interpretation.  There does appear to be   
infiltrate versus atelectasis in the lower lobes bilaterally however the patient  
is not hypoxic, tachypneic.  He has no leukocytosis.  CT brain was negative.  I   
had a long discussion with the family regarding his care.  They still expressed   
concern that he might of had a stroke so we discussed admitting him for stroke   
workup.  They had discussed with me that the patient has a significant history   
of liver cancer for which she has been evaluated has cancer specialist (Dr. Ortega).  They have a follow-up MRI on Friday to reevaluate his liver.  I   
counseled him that his liver enzymes are normal.  His hemoglobin is normal.  I   
think that if he came in for a stroke workup that he would still be able to make  
his outpatient appointments.  His ammonia level was slightly high today at 34   
with a cutoff of 32 was normal.  Patient was discussed with hospitalist for   
admission.    
    
Impression:    
1.  TIA    
2.  Altered mental status    
3.  Elevated ammonia level    
4.  Debility    
Lab Data    
Attestation: I reviewed the patient's lab results.    
Labs:     
    
                         Laboratory Results - last 24 hr    
    
    
    
  02/07/24    
    
  15:15    
     
WBC  4.9    
     
RBC  3.26 L    
     
Hgb  10.7 L    
     
Hct  32.7 L    
     
MCV  100.3 H    
     
MCH  32.8 H    
     
MCHC  32.7    
     
RDW Std Deviation  61.5 H    
     
RDW Coeff of Ilia  16.9 H    
     
Plt Count  58 L    
     
MPV  11.9    
     
Immature Gran % (Auto)  0.200    
     
Neut % (Auto)  87.8 H    
     
Lymph % (Auto)  5.1 L    
     
Mono % (Auto)  5.9    
     
Eos % (Auto)  0.8    
     
Baso % (Auto)  0.2    
     
Absolute Neuts (auto)  4.3    
     
Absolute Lymphs (auto)  0.25 L    
     
Nucleated RBC %  0    
     
Sodium  140    
     
Potassium  3.8    
     
Chloride  110 H    
     
Carbon Dioxide  29.0    
     
Anion Gap  1 L    
     
BUN  40 H    
     
Creatinine  1.19    
     
Estim Creat Clear Calc  57.96    
     
Est GFR (MDRD) Af Amer  75    
     
Est GFR (MDRD) Non-Af  62    
     
BUN/Creatinine Ratio  33.6 H    
     
Glucose  119 H    
     
Calcium  12.5 H    
     
Total Bilirubin  0.80    
     
Direct Bilirubin  0.30    
     
AST  36    
     
ALT  29    
     
Alkaline Phosphatase  156 H    
     
Ammonia  34.0 H    
     
Troponin I High Sens  24    
     
B-Natriuretic Peptide  104.6 H    
     
Total Protein  5.9 L    
     
Albumin  2.3 L    
     
Globulin  3.6    
     
Albumin/Globulin Ratio  0.6 L    
    
    
    
    
Radiography    
Diagnostic Testing:     
    
Clinical Impression(s) from Imaging Studies    
    
Chest X-Ray  02/07/24 15:22    
IMPRESSION:    
Blunting of the right costophrenic angle with bibasilar atelectasis and/or    
infiltrates as well as increased markings in the right upper lobe abutting    
the right minor fissure. Follow-up recommended.    
     
Electronically Signed:    
Tay Mclain MD    
2024/02/07 at 15:35 EST    
Reading Location ID and State: [address] / [place]    
Tel [phone], Service support  [phone], Fax [phone]    
     
    
    
Brain CT  02/07/24 15:25    
IMPRESSION:    
Chronic involutional changes of the brain.    
     
Electronically Signed:    
Tay Mclain MD    
2024/02/07 at 15:43 EST    
Reading Location ID and State: [address] / [place]    
Tel [phone], Service support  [phone], Fax [phone]    
     
    
    
    
    
    
Discharge Plan    
Disposition    
***Disposition***: [facility]    
    
Discharge Date/Time: 02/07/24 17:49
details…

## 2024-02-07 NOTE — ECHOCS_ITS
Reason For Study: TIA/CVA 
 
Procedure 
This was a 2D Doppler, Color Flow transthoracic echocardiogram. The study was technically difficult. 
Exam performed portable in patient room. 
 
Left Ventricle 
Normal size and thickness. The left ventricular ejection fraction is 55 %. 
 
Right Ventricle 
Moderately dilated right ventricle. Mild to moderate global right ventricular systolic dysfunction. 
 
Atria 
There is severe biatrial dilatation. Bubble contrast study is negative for PFO/ASD. 
 
Mitral Valve 
Severe mitral annular calcification. 
 
Tricuspid Valve 
Mild tricuspid valve insufficiency. 
 
Aortic Valve 
The aortic valve is not well visualized. Mean gradient across the bioprosthetic aortic valve 6.7 
mmHg. 
 
Pulmonic Valve 
The pulmonic valve is not well visualized. 
 
Great Vessels 
The aortic root is not well visualized. 
 
Pericardium/Pleural 
No pericardial effusion. 
 
Medication 
Performed a rapid injection of agitated mix of 9 cc saline and 1cc air to assess for atrial septal 
defect. Diluted definity 2ml given slow IV push to enhance endocardial definition. 
 
MMode/2D Measurements & Calculations 
LVIDd: 4.8 cm               IVSd: 0.82 cm                           LVOT diam: 2.0 cm 
LVIDs: 3.1 cm               LVPWd: 0.93 cm 
RVDd: 4.0 cm                FS: 36.2 %                              LVOT area: 3.3 cm2 
 
          _________________________________________________________________________________ 
Ao root diam: 3.3 cm        LAV(MOD-bp): 81.1 ml                    LVAd ap4: 34.4 cm2 
                            LAV(MOD-bp) Indexed: 36.0 ml/m2         LVLd ap4: 7.9 cm 
                            LAV(MOD-sp2): 57.9 ml                   EDV(MOD-sp4): 123.3 ml 
                            LAV(MOD-sp4): 107.7 ml                  EDV(sp4-el): 127.4 ml 
 
                                                                    LVAs ap4: 21.4 cm2 
                                                                    LVLs ap4: 6.8 cm 
                                                                    ESV(MOD-sp4): 56.4 ml 
                                                                    ESV(sp4-el): 57.1 ml 
                                                                    EF(MOD-sp4): 54.2 % 
                                                                    EF(sp4-el): 55.1 % 
          _________________________________________________________________________________ 
LVAd ap2: 17.3 cm2          SV(MOD-sp4): 66.9 ml                    SV(MOD-sp2): 22.6 ml 
LVLd ap2: 6.9 cm 
EDV(MOD-sp2): 35.6 ml 
EDV(sp2-el): 36.6 ml 
 
LVAs ap2: 10.0 cm2 
LVLs ap2: 6.2 cm 
ESV(MOD-sp2): 13.1 ml 
ESV(sp2-el): 13.6 ml 
EF(MOD-sp2): 63.4 % 
          _________________________________________________________________________________ 
SV(sp4-el): 70.2 ml                                                 LA dimension(2D): 5.0 cm 
                            LA A4 area: 30.3 cm2 
          _________________________________________________________________________________ 
RA A4 area: 29.8 cm2 
 
Doppler Measurements & Calculations 
MV E max aneudy: 124.5 cm/sec     Lat Peak E' Aneudy: 9.3 cm/sec          Med Peak E' Aneudy: 9.9 cm/sec 
                               E/E' lat: 13.4                       E/E' med: 12.6 
 
          _________________________________________________________________________________ 
MV V2 max: 184.4 cm/sec        MV P1/2t max aneudy: 179.8 cm/sec       Ao V2 max: 184.0 cm/sec 
MV max P.6 mmHg           MV P1/2t: 31.5 msec                  Ao max P.5 mmHg 
MV V2 mean: 109.9 cm/sec                                            Ao V2 mean: 119.5 cm/sec 
MV mean P.9 mmHg           MV dec slope: 1674 cm/sec2           Ao mean P.7 mmHg 
MV V2 VTI: 21.9 cm             MVA(P1/2t): 7.0 cm2                  Ao V2 VTI: 35.5 cm 
                                                                    AV (velocity ratio): 0.32 
MVA(VTI): 1.7 cm2 
                                                                    JAMES(I,D): 1.0 cm2 
                                                                    JAMES(V,D): 1.3 cm2 
          _________________________________________________________________________________ 
LV V1 max: 71.8 cm/sec         SV(LVOT): 37.0 ml                    PA V2 max: 66.9 cm/sec 
LV V1 max P.1 mmHg 
LV V1 mean P.1 mmHg 
LV V1 mean: 47.7 cm/sec 
LV V1 VTI: 11.2 cm 
          _________________________________________________________________________________ 
TR max aneudy: 245.3 cm/sec 
TR max P.1 mmHg 
 
ORDER #: [id number] ECHO/Echo Complete W/ Contrast  
Interpretation Summary  
The left ventricular ejection fraction is 55 %.  
Moderately dilated right ventricle.  
Mild to moderate global right ventricular systolic dysfunction.  
There is severe biatrial dilatation.  
Mild tricuspid valve insufficiency.  
Mean gradient across the bioprosthetic aortic valve 6.7 mmHg.  
Severe mitral annular calcification that extends into the LVOT as well.  
Bubble contrast study is negative for PFO/ASD.  
Technically difficult study with suboptimal images. Consider cardiac MRI for fu
rther evaluation if  
clinically indicated.  
 
  
 
  
______________________________________________________________________________  
Ordering Physician: Pebbles Schwartz  
Referring Physician: Júnior Ahuja M.D.  
Performed By: Lory Guillen RDCS  
Electronically signed by: Britney Santiago MD on 2024 02:54 PM

## 2024-02-07 NOTE — [id number]
HPI
History of Present Illness
Chief Complaint: Weakness
Narrative
Narrative: 
84-year-old male presenting for evaluation of altered mental status.  Patient is not able to give history.  Patient has a history of cirrhosis, liver cancer, GERD, anemia, CVA currently in assisted living.  His daughter gives most of the history.  
She states that he just had a PT/OT evaluation and is going to go to the [facility] since Sunday has not been himself.  She reports that Sunday he started to have having difficulty talking and at times slurred speech.  She states that she asked 
him about going to [employer] to get a frosty and had several conversations after that and then later she states he replied yes to her question about her prostate.  She estimates she is about 20 minutes.  Patient with no history of falls at the 
nursing.  Daughter states that he in addition to this has had some contracture of the right fingers of the right hand.  Although when I ask him to he can open his hand and it does not appear to be in contracture.  Patient states she noted that his 
left leg was not usable when she tried to transfer him from bed to car and vice versa.  She states she was told that he was a 1 person assist but is required 2 people because he cannot use his left leg.

[facility]
Medical History (Updated 02/07/24 @ 17:47 by Dr. Pebbles Schwartz MD)

Anemia
Anemia in chronic kidney disease
Anemia of chronic disease
Aortic valve disorder
Asthma
Body mass index (BMI) 40.0-44.9, adult
Chronic acquired lymphedema
CKD (chronic kidney disease), stage III
Depression
Essential (primary) hypertension
GERD (gastroesophageal reflux disease)
Gout
History of cerebrovascular accident
History of splenomegaly
Hyperlipemia
Liver cancer
Morbid obesity
Multiple lung nodules
New onset atrial flutter
Non-alcoholic cirrhosis
Nonobstructive atherosclerosis of coronary artery
Nonrheumatic aortic (valve) stenosis
OAB (overactive bladder)
Obesity
Obesity (BMI 30-39.9)
CAREN treated with BiPAP
Osteoarthritis
Paroxysmal A-fib
PCK (polycystic kidney disease)
Peripheral neuropathy
Polycystic kidney disease
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
Right bundle branch block (RBBB)
Splenomegaly
Thrombocytopenia
Thrombocytopenia
Urge incontinence


Home Medications

allopurinol 300 mg tablet 300 mg PO DAILY gout 12/27/16 [History Last Taken 12/22/19]
fenofibrate nanocrystallized 48 mg tablet 48 mg PO DAILY cholesterol 12/27/16 [History Last Taken 12/22/19]
magnesium oxide 400 mg (241.3 mg magnesium) tablet 400 mg PO DAILY supplement 12/27/16 [History Last Taken 12/22/19]
multivit-min-folic acid 0.4 mg-lycopene 300 mcg-lutein 250 mcg tablet 1 ea PO DAILY vitamin 12/27/16 [History Last Taken 12/22/19]
sertraline 100 mg tablet 50 mg PO QHS depression 05/21/20 [History Last Taken Unknown]
aspirin 81 mg tablet,delayed release (Adult Aspirin Regimen) 81 mg PO DAILY 07/27/21 [History Last Taken Unknown]
ferrous sulfate 325 mg (65 mg iron) tablet 325 mg PO BID 07/27/21 [History Last Taken Unknown]
albuterol sulfate 90 mcg/actuation aerosol inhaler 2 puff inhalation Q6H PRN ASTHMA 02/08/22 [History Last Taken Unknown]
fluticasone propionate 50 mcg/actuation nasal spray,suspension 1 spray intranasal DAILY 02/08/22 [History Last Taken Unknown]
omeprazole 40 mg capsule,delayed release 40 mg PO DAILY 02/08/22 [History Last Taken Unknown]
oxybutynin chloride 15 mg tablet,extended release 24 hr 30 mg PO DAILY 02/08/22 [History Last Taken Unknown]
ascorbic acid (vitamin C) 500 mg capsule 500 mg PO DAILY 01/31/23 [History Last Taken Unknown]
furosemide 20 mg tablet 20 mg PO Q OTHER DAY 01/31/23 [History Last Taken Unknown]
nitroglycerin 0.4 mg sublingual tablet 0.4 mg sublingual Q5M PRN chest pain 01/31/23 [History Last Taken Unknown]
lidocaine 5 % topical patch (Lidoderm) 1 patch topical DAILY #15 ea 05/28/23 [Rx Last Taken Unknown]
cyanocobalamin (vitamin B-12) 5,000 mcg capsule 2,500 mcg PO DAILY 07/15/23 [History Last Taken Unknown]
rifaximin 550 mg tablet (Xifaxan) 550 mg PO BID 07/15/23 [History Last Taken Unknown]
diltiazem HCl 120 mg capsule,extended release 24 hr 120 mg PO DAILY #30 caps 08/23/23 [Rx Last Taken Unknown]
metoprolol tartrate 25 mg tablet 25 mg PO BID #60 tabs 08/23/23 [Rx Last Taken Unknown]
fluticasone furoate 200 mcg-vilanterol 25 mcg/dose inhalation powder (Breo Ellipta) 1 inh inhalation DAILY 12/21/23 [History Last Taken Unknown]
cefdinir 300 mg capsule 300 mg PO BID 4 days #8 caps 12/26/23 [Rx Last Taken Unknown]
ferrous sulfate 325 mg (65 mg iron) tablet,delayed release mg PO 02/07/24 [History Last Taken Unknown]




Allergy/AdvReac Type Severity Reaction Status Date / Time
hydrocodone [From Vicodin] Allergy  Other Verified 02/07/24 14:53
strawberry Allergy  Hives Verified 02/07/24 14:53
venom-honey bee Allergy  Anaphylaxis Verified 02/07/24 14:53
[bee venom (honey bee)]     
adhesive tape AdvReac  Rash Verified 02/07/24 14:53
atorvastatin AdvReac  PT UNSURE Verified 02/07/24 14:53
   OF REACTION  
montelukast AdvReac  PT UNSURE Verified 02/07/24 14:53
   OF REACTION  


Family History (Reviewed 02/07/24 @ 16:45 by Dr. Ritchie Fernando, DO)
Father
 CAD (coronary artery disease)
Brother
 Diabetes
Mother
 Heart disease


Surgical History (Reviewed 02/07/24 @ 16:45 by Dr. Ritchie Fernando, DO)

Aortic valve replaced
History of aortic valve replacement with bioprosthetic valve (10/04/11)
History of hip surgery (05/2020)
History of knee replacement
History of left heart catheterization (09/21/11)
History of tonsillectomy
History of total left hip arthroplasty
History of total left hip replacement
History of umbilical hernia repair


Social History (Reviewed 02/07/24 @ 16:45 by Dr. Ritchie Fernando, )
household members:  none 
Smoking Status:  Never smoker 
alcohol intake:  never 
substance use type:  does not use 



ROS
ROS ED
Review of Systems
ROS Unobtainable: due to mental condition and due to mental status

EXAM
Physical Exam
Const
Vital Signs: 



 02/07/24
14:47 02/07/24
14:53 02/07/24
16:18
Temperature 96.6 F L  
Temperature Source Temporal  
Pulse Rate 126 H  128 H
Respiratory Rate 13  16
Respiratory Effort  Normal 
Respiratory Pattern  Normal 
Blood Pressure 114/88 H  131/95 H
Blood Pressure Mean 96  107
Pulse Ox 95  99
Oxygen Delivery Method Room Air  Room Air

 02/07/24
17:04
Temperature 
Temperature Source 
Pulse Rate 130 H
Respiratory Rate 12
Respiratory Effort 
Respiratory Pattern 
Blood Pressure 142/109 H
Blood Pressure Mean 120
Pulse Ox 96
Oxygen Delivery Method Room Air



Positive well nourished
General Appearance ED: NAD; Negative for pallor
HEENT
Reports moist mucous membranes
Eyes
PERRL and EOMs intact bilaterally
General Eye ED: Negative for pale conjunctiva
Resp
normal respiratory effort and clear to auscultation bilaterally
Auscultation: Negative for rales, rhonchi or wheezes
Cardio
regular rate and regular rhythm
GI
normal to inspection, nondistended, normoactive bowel sounds
Extremity
normal to inspection
Neuro
oriented x3 and CN's II-XII intact bilaterally
Sensorium / Orientation: alert
Psych
mental status grossly normal
Skin
no rashes or lesions noted
General Skin Exam: Negative for jaundice or pallor

MDM
MDM
MDM Narrative
Medical decision making narrative: 
Patient presenting with weakness.  Initially had no family with him.  He was unable to give a history.  Report was that they were concerned for stroke as the patient was not able to talk for short time.  This is the only history I had before his 
family arrived.  Patient is awake and alert and in no distress.  Slightly tachycardic but otherwise well-appearing.  He is not able to answer many questions.  Reviewed the medical history.  Differential includes stroke, intracranial hemorrhage, 
spine fracture, compression, dehydration, anemia, electrolyte abnormality, ACS, CHF, COVID, influenza, RSV, UTI, dysrhythmia, hepatic encephalopathy.  CBC will be obtained to assess with blood cell count, hemoglobin, platelets.  CMP to assess liver 
function, renal function, electrolytes, glucose.  High-sensitivity troponin and EKG to assess for ischemia.  BNP to assess for CHF.  Ammonia to assess for hyperammonemia.  Chest pain rule out pneumonia or CHF.  COVID, influenza, RSV will be 
obtained.  Patient was given 500 cc of normal saline.  CBC shows normal white blood cell count of 4.9.  Hemoglobin 10.7 at baseline.  Platelets low at 58 also at baseline..  Creatinine 1.19.  Electrolytes are normal.  LFTs unremarkable.  
High-sensitivity troponin is 24.  .6.  EKG on my interpretation shows sinus tachycardia at a ventricular rate of 129 bpm.  Chest x-ray shows blunting of the right costophrenic angle on my interpretation.  There does appear to be infiltrate 
versus atelectasis in the lower lobes bilaterally however the patient is not hypoxic, tachypneic.  He has no leukocytosis.  CT brain was negative.  I had a long discussion with the family regarding his care.  They still expressed concern that he 
might of had a stroke so we discussed admitting him for stroke workup.  They had discussed with me that the patient has a significant history of liver cancer for which she has been evaluated has cancer specialist (Dr. Ortega).  They have a follow-up 
MRI on Friday to reevaluate his liver.  I counseled him that his liver enzymes are normal.  His hemoglobin is normal.  I think that if he came in for a stroke workup that he would still be able to make his outpatient appointments.  His ammonia level 
was slightly high today at 34 with a cutoff of 32 was normal.  Patient was discussed with hospitalist for admission.

Impression:
1.  TIA
2.  Altered mental status
3.  Elevated ammonia level
4.  Debility
Lab Data
Attestation: I reviewed the patient's lab results.
Labs: 

Laboratory Results - last 24 hr

  02/07/24
  15:15
WBC  4.9
RBC  3.26 L
Hgb  10.7 L
Hct  32.7 L
MCV  100.3 H
MCH  32.8 H
MCHC  32.7
RDW Std Deviation  61.5 H
RDW Coeff of Ilia  16.9 H
Plt Count  58 L
MPV  11.9
Immature Gran % (Auto)  0.200
Neut % (Auto)  87.8 H
Lymph % (Auto)  5.1 L
Mono % (Auto)  5.9
Eos % (Auto)  0.8
Baso % (Auto)  0.2
Absolute Neuts (auto)  4.3
Absolute Lymphs (auto)  0.25 L
Nucleated RBC %  0
Sodium  140
Potassium  3.8
Chloride  110 H
Carbon Dioxide  29.0
Anion Gap  1 L
BUN  40 H
Creatinine  1.19
Estim Creat Clear Calc  57.96
Est GFR (MDRD) Af Amer  75
Est GFR (MDRD) Non-Af  62
BUN/Creatinine Ratio  33.6 H
Glucose  119 H
Calcium  12.5 H
Total Bilirubin  0.80
Direct Bilirubin  0.30
AST  36
ALT  29
Alkaline Phosphatase  156 H
Ammonia  34.0 H
Troponin I High Sens  24
B-Natriuretic Peptide  104.6 H
Total Protein  5.9 L
Albumin  2.3 L
Globulin  3.6
Albumin/Globulin Ratio  0.6 L



Radiography
Diagnostic Testing: 

Clinical Impression(s) from Imaging Studies

Chest X-Ray  02/07/24 15:22
IMPRESSION:
Blunting of the right costophrenic angle with bibasilar atelectasis and/or
infiltrates as well as increased markings in the right upper lobe abutting
the right minor fissure. Follow-up recommended.
 
Electronically Signed:
Tay Mclain MD
2024/02/07 at 15:35 EST
Reading Location ID and State: [address] / [place]
Tel [phone], Service support  [phone], Fax [phone]
 


Brain CT  02/07/24 15:25
IMPRESSION:
Chronic involutional changes of the brain.
 
Electronically Signed:
Tay Mclain MD
2024/02/07 at 15:43 EST
Reading Location ID and State: [address] / [place]
Tel [phone], Service support  [phone], Fax [phone]
 





Discharge Plan
Disposition
***Disposition***: [facility]

Discharge Date/Time: 02/07/24 17:49

## 2024-02-07 NOTE — PCM.HP.STD
HPI - General
General
Date of Admission: 02/07/24
Date of Service: 02/07/24
Chief Complaint: LLE weakness and change in mental status
HPI Narrative
NENA MADRIGAL, is a 84-year-old male history of cirrhosis, liver cancer, GERD, pafib, anemia, gout CVA presented to [facility] ED 2/7/2024 from assisted living for altered mental status.  Reportedly he has not been himself since 
Sunday and at that time had been having some difficulty talking and at times slurred speech.  No recent falls but daughter noted some contracture of right fingers on right hand but was able to open hand in ED without significant difficulty.  Has 
been having difficulty using his left leg however.  Family was concerned for stroke with patient due to his left leg weakness and not being able to talk for a period of time earlier.  CT brain in ED negative, ammonia 34, patiently mildly 
tachycardic.  Lab workup otherwise similar to patient's baseline/fairly benign.  Given there is still concern patient may have had recent stroke hospitalist contacted for admission for stroke rule out.  Patient evaluated at bedside with family 
present who provided most of the history.  Reportedly patient has been at [place] and had been doing fairly well and was a 1 assist and his daughter saw him on Thursday and he was not having any symptoms or changes in mental status, she saw them on 
Sunday and said he was slow to answer and thought his speech may be slightly slurred but he was evaluated and it did not seem that he had a stroke so plan was to proceed with him being transferred to [facility].  When he was evaluated 
there today he was not using his left leg at all and required multiple people to assist him which prompted patient coming to the ED.  Reportedly his left leg chronically does not work as well as his right but he usually is able to use it somewhat 
and this level of dysfunction is not common for him.  Also in ED complaining of some left-sided rib pain, was here in December with pneumonia and has had some cough residual since that time.  Additional concern that patient has his hands clenched 
and holds them like that.  Family at bedside report patient does seem slightly better but he is not back to baseline.


[facility] / [facility]
Medical History (Updated 02/07/24 @ 17:47 by Dr. Pebbles Schwartz MD)

Anemia
Anemia in chronic kidney disease
Anemia of chronic disease
Aortic valve disorder
Asthma
Body mass index (BMI) 40.0-44.9, adult
Chronic acquired lymphedema
CKD (chronic kidney disease), stage III
Depression
Essential (primary) hypertension
GERD (gastroesophageal reflux disease)
Gout
History of cerebrovascular accident
History of splenomegaly
Hyperlipemia
Liver cancer
Morbid obesity
Multiple lung nodules
New onset atrial flutter
Non-alcoholic cirrhosis
Nonobstructive atherosclerosis of coronary artery
Nonrheumatic aortic (valve) stenosis
OAB (overactive bladder)
Obesity
Obesity (BMI 30-39.9)
CAREN treated with BiPAP
Osteoarthritis
Paroxysmal A-fib
PCK (polycystic kidney disease)
Peripheral neuropathy
Polycystic kidney disease
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
Right bundle branch block (RBBB)
Splenomegaly
Thrombocytopenia
Thrombocytopenia
Urge incontinence


Home Medications

allopurinol 300 mg tablet 300 mg PO DAILY gout 12/27/16 [History Last Taken 12/22/19]
fenofibrate nanocrystallized 48 mg tablet 48 mg PO DAILY cholesterol 12/27/16 [History Last Taken 12/22/19]
magnesium oxide 400 mg (241.3 mg magnesium) tablet 400 mg PO DAILY supplement 12/27/16 [History Last Taken 12/22/19]
multivit-min-folic acid 0.4 mg-lycopene 300 mcg-lutein 250 mcg tablet 1 ea PO DAILY vitamin 12/27/16 [History Last Taken 12/22/19]
sertraline 100 mg tablet 50 mg PO QHS depression 05/21/20 [History Last Taken Unknown]
aspirin 81 mg tablet,delayed release (Adult Aspirin Regimen) 81 mg PO DAILY 07/27/21 [History Last Taken Unknown]
ferrous sulfate 325 mg (65 mg iron) tablet 325 mg PO BID 07/27/21 [History Last Taken Unknown]
albuterol sulfate 90 mcg/actuation aerosol inhaler 2 puff inhalation Q6H PRN ASTHMA 02/08/22 [History Last Taken Unknown]
fluticasone propionate 50 mcg/actuation nasal spray,suspension 1 spray intranasal DAILY 02/08/22 [History Last Taken Unknown]
omeprazole 40 mg capsule,delayed release 40 mg PO DAILY 02/08/22 [History Last Taken Unknown]
oxybutynin chloride 15 mg tablet,extended release 24 hr 30 mg PO DAILY 02/08/22 [History Last Taken Unknown]
ascorbic acid (vitamin C) 500 mg capsule 500 mg PO DAILY 01/31/23 [History Last Taken Unknown]
furosemide 20 mg tablet 20 mg PO Q OTHER DAY 01/31/23 [History Last Taken Unknown]
nitroglycerin 0.4 mg sublingual tablet 0.4 mg sublingual Q5M PRN chest pain 01/31/23 [History Last Taken Unknown]
lidocaine 5 % topical patch (Lidoderm) 1 patch topical DAILY #15 ea 05/28/23 [Rx Last Taken Unknown]
cyanocobalamin (vitamin B-12) 5,000 mcg capsule 2,500 mcg PO DAILY 07/15/23 [History Last Taken Unknown]
rifaximin 550 mg tablet (Xifaxan) 550 mg PO BID 07/15/23 [History Last Taken Unknown]
diltiazem HCl 120 mg capsule,extended release 24 hr 120 mg PO DAILY #30 caps 08/23/23 [Rx Last Taken Unknown]
metoprolol tartrate 25 mg tablet 25 mg PO BID #60 tabs 08/23/23 [Rx Last Taken Unknown]
fluticasone furoate 200 mcg-vilanterol 25 mcg/dose inhalation powder (Breo Ellipta) 1 inh inhalation DAILY 12/21/23 [History Last Taken Unknown]
cefdinir 300 mg capsule 300 mg PO BID 4 days #8 caps 12/26/23 [Rx Last Taken Unknown]
ferrous sulfate 325 mg (65 mg iron) tablet,delayed release mg PO 02/07/24 [History Last Taken Unknown]




Allergy/AdvReac Type Severity Reaction Status Date / Time
hydrocodone [From Vicodin] Allergy  Other Verified 02/07/24 14:53
strawberry Allergy  Hives Verified 02/07/24 14:53
venom-honey bee Allergy  Anaphylaxis Verified 02/07/24 14:53
[bee venom (honey bee)]     
adhesive tape AdvReac  Rash Verified 02/07/24 14:53
atorvastatin AdvReac  PT UNSURE Verified 02/07/24 14:53
   OF REACTION  
montelukast AdvReac  PT UNSURE Verified 02/07/24 14:53
   OF REACTION  


Family History (Reviewed 02/07/24 @ 16:45 by Dr. Ritchie Fernando, DO)
Father
 CAD (coronary artery disease)
Brother
 Diabetes
Mother
 Heart disease


Surgical History (Reviewed 02/07/24 @ 16:45 by Dr. Ritchie Fernando, DO)

Aortic valve replaced
History of aortic valve replacement with bioprosthetic valve (10/04/11)
History of hip surgery (05/2020)
History of knee replacement
History of left heart catheterization (09/21/11)
History of tonsillectomy
History of total left hip arthroplasty
History of total left hip replacement
History of umbilical hernia repair


Social History (Reviewed 02/07/24 @ 16:45 by Dr. Ritchie Fernando, DO)
household members:  none 
Smoking Status:  Never smoker 
alcohol intake:  never 
substance use type:  does not use 



ROS
ROS Narrative
General: Denies fever/chills
HENT: Denies headache, denies stuffy nose, denies sore throat
EYES: Denies changes in vision
Resp: Has had persistent cough, possibly some shortness of breath
Cardiac: Denies chest pain
GI: Denies abdominal pain, denies changes in bowel, denies nausea/vomiting
: Denies changes in urination
Extremity: Chronic lower extremity swelling
MSK: Chronic left lower extremity weakness but more so than usual and more so than right side
Neuro: Denies any numbness/tingling
Heme: Denies any bleeding or bruising
Skin: Denies rashes
Psychiatric: No complaints voiced

Vital Signs
Vital Signs
Vital Signs: 


 02/07/24
14:47 02/07/24
14:53 02/07/24
16:18
Temperature 96.6 F L  
Temperature Source Temporal  
Pulse Rate 126 H  128 H
Respiratory Rate 13  16
Respiratory Effort  Normal 
Respiratory Pattern  Normal 
Blood Pressure 114/88 H  131/95 H
Blood Pressure Mean 96  107
Pulse Ox 95  99
Oxygen Delivery Method Room Air  Room Air

 02/07/24
17:04
Temperature 
Temperature Source 
Pulse Rate 130 H
Respiratory Rate 12
Respiratory Effort 
Respiratory Pattern 
Blood Pressure 142/109 H
Blood Pressure Mean 120
Pulse Ox 96
Oxygen Delivery Method Room Air

Weight

Weight:                        112.2 kg                                          
Body Mass Index (BMI)          35.4                                              




Physical Exam
Narrative
General: Alert, no apparent distress
HEENT: Atraumatic, poor dentition
Eyes: Anicteric, normal conjunctiva, extraocular movements intact, pupils equal
Neck: Supple
Respiratory: Transmitted upper airway sounds, somewhat diminished at the bases, normal respiratory effort
Cardiovascular: Regular rhythm but tachycardic
GI: Soft, nontender, no rebound, guarding, rigidity
Extremities: Trace lower extremity edema with compression hose
Musculoskeletal: Strength 5 out of 5 in right upper extremity, 5 out of 5 left upper extremity, 3 out of 5 right lower extremity, 1 out of 5 left lower extremity, reports pain under left armpit but this was not reproducible on my exam
Neuro: No overt focal neurological deficits, patient does have both hands and fists and slightly contracted but opens them up on prompting
Skin: No rashes appreciated
Psych: Cooperative

Results
Lab / Micro Data

02/07/24 15:15          

02/07/24 15:15          

Labs: 
Laboratory Results - last 24 hr

02/07/24 15:15: WBC 4.9, RBC 3.26 L, Hgb 10.7 L, Hct 32.7 L, .3 H, MCH 32.8 H, MCHC 32.7, RDW Std Deviation 61.5 H, RDW Coeff of Ilia 16.9 H, Plt Count 58 L, MPV 11.9, Immature Gran % (Auto) 0.200, Neut % (Auto) 87.8 H, Lymph % (Auto) 5.1 L, 
Mono % (Auto) 5.9, Eos % (Auto) 0.8, Baso % (Auto) 0.2, Absolute Neuts (auto) 4.3, Absolute Lymphs (auto) 0.25 L, Nucleated RBC % 0, Sodium 140, Potassium 3.8, Chloride 110 H, Carbon Dioxide 29.0, Anion Gap 1 L, BUN 40 H, Creatinine 1.19, Estim 
Creat Clear Calc 57.96, Est GFR (MDRD) Af Amer 75, Est GFR (MDRD) Non-Af 62, BUN/Creatinine Ratio 33.6 H, Glucose 119 H, Calcium 12.5 H, Total Bilirubin 0.80, Direct Bilirubin 0.30, AST 36, ALT 29, Alkaline Phosphatase 156 H, Ammonia 34.0 H, 
Troponin I High Sens 24, B-Natriuretic Peptide 104.6 H, Total Protein 5.9 L, Albumin 2.3 L, Globulin 3.6, Albumin/Globulin Ratio 0.6 L


Micro: 
Microbiology

02/07/24 15:15   Mucosa - Nose   SARS-CoV-2, Influenza & RSV (PCR) - Final


Imaging
Radiology Impression

Chest X-Ray  02/07/24 15:22
IMPRESSION:
Blunting of the right costophrenic angle with bibasilar atelectasis and/or
infiltrates as well as increased markings in the right upper lobe abutting
the right minor fissure. Follow-up recommended.
 
Electronically Signed:
Tay Mclain MD
2024/02/07 at 15:35 EST
Reading Location ID and State: [address] / [place]
Tel [phone], Service support  [phone], Fax [phone]
 


Brain CT  02/07/24 15:25
IMPRESSION:
Chronic involutional changes of the brain.
 
Electronically Signed:
Tay Mclain MD
2024/02/07 at 15:43 EST
Reading Location ID and State: [address] / [place]
Tel [phone], Service support  [phone], Fax [phone]
 




Assessment & Plan
Assessment/Plan
(1) Neurologic abnormality: 
(2) Gout: 
(3) GERD (gastroesophageal reflux disease): 
(4) Anemia of chronic disease: 
(5) Cirrhosis: 
(6) Obstructive sleep apnea on CPAP: 
PLAN: 
Plan
#AMS and weakness, concern for CVA
-Left lower extremity weakness at baseline but extremity almost nonfunctional and patient's speech and responsiveness not back to baseline
-Admit to tele
-CT head w/ chronic changes in ED
-CTA head and neck ordered
-MRI ordered
-NIH q4hr
-Listed allergy to statin, pt w/ thrombocytopenia so pt not on antiplatelet or AC
-Given suspicion is not significantly high for CVA as other etiology may be because we will hold on empiric antiplatelet due to risk of bleeding and adverse events with his thrombocytopenia
-Echo w/ bubble study
-PT/OT/Speech eval
-Hold BP medications to allow for permissive hypertension for 24 hours unless SBP greater than 220 or DBP greater than 120 or until stroke is ruled out
-Ammonia minimally elevated
-Awaiting UA as patient could have UTI contributing

# Hypercalcemia
-Could be contributor to weakness and altered mental status
-?Secondary to malignancy
-Tends to be elevated though has been slightly higher recently than previous
-Very gentle IV fluids given patient's propensity for fluid overload, can treat more aggressively pending treatment response and findings

# Pancytopenia
-Patient routinely pancytopenic
-White blood cell count 4.9, lower limits of normal today though usually is below the 4.4 threshold
-Appears to be at baseline
-Follow-up with oncology on outpatient basis

#Liver cancer and cirrhosis
-Follows with Dr. Ortega
-Has follow-up MRI Friday to reevaluate his liver

#pafib
-Admitted to [facility] in December and was found to have paroxysmal A-fib with RVR
-On rate control but not on anticoagulation
-Cardiology had evaluated and patient was back in normal sinus rhythm and given fall risk they were hesitant to start anticoagulation
-Continue rate control

#RV dysfunction/AV bioprosthetic valve
-Echo 7/2023 with EF 60% and indeterminate diastolic function however patient had severely dilated right ventricle with global right ventricular dysfunction and severe mitral annular calcification with RVSP of 44
-Daily weights, I's and O's
-Giving very gentle hydration

# Depression and anxiety
-Continue sertraline

#GERD
-Continue PPI

#Gout
-Continue allppurinol

#CAREN
-Continue bipap

#DVT ppx: SCDs

Pebbles Schwartz MD


Charges/Coding
Visit Charges
Inpatient E&M: 80186 Init Hosp L2

## 2024-02-07 NOTE — XMS RPT_ITS
Comprehensive CCD (C-CDA v2.1)  
  
                          Created on: 2024  
  
  
Kade, Mr. Mitesh ARAUZ  
External Reference #: CDR,PersonID:[id number]  
: 1940  
Sex: Male  
  
Demographics  
  
  
                                        Address             [address] [place]  [address]  
   
                                        Home Phone          [phone]  
   
                                        Mobile Phone        [phone]  
   
                                        Preferred Language  en  
   
                                        Marital Status        
   
                                        Synagogue Affiliation Unknown  
   
                                        Race                White  
   
                                        Ethnic Group        Not  or Lati  
no  
  
  
Author  
  
  
                                        Name                Unknown  
   
                                        Address             [address] [facility]  
[address]  
[place]  [address]  
   
                                        Phone               [phone]  
   
                                        Organization        [facility]  
  
  
Care Team Providers  
  
  
                                Care Team Member Name Role            Phone  
   
                                DANIEL, DEANGELO E Unavailable     Unavailable  
   
                                DANIEL, DEANGELO E Unavailable     Unavailable  
   
                                DANIEL, DEANGELO Unavailable     Unavailable  
   
                                DANIEL, DEANGELO Unavailable     Unavailable  
   
                                DANIEL, DEANGELO Unavailable     Unavailable  
   
                                DANIEL, DEANGELO Unavailable     Unavailable  
   
                                Ivanauskas, Saulius Unavailable     Unavailable  
   
                                Ivanauskas, Saulius Unavailable     Unavailable  
   
                                Júnior Ahuja Unavailable     Unavailable  
   
                                Júnior Ahuja MD Primary Care Provider [phone]  
[phone]  
   
                                Júnior Ahuja MD Primary Care Provider [phone]  
[phone]  
   
                                Seymour UNDERWOOD, Júnior PERKINS Unavailable     [phone]  
[phone]  
   
                                Seymour UNDERWOOD, Júnior PERKINS Unavailable     [phone]  
[phone]  
   
                                David PT, Brenda Unavailable     [phone]  
[phone]  
   
                                Seymour UNDERWOOD, Júnior PERKINS Unavailable     [phone]  
[phone]  
   
                                David PT, Brenda Unavailable     [phone]  
[phone]  
   
                                Júnior Ahuja MD Primary Care Provider [phone]  
[phone]  
   
                                Seymour UNDERWOOD, Júnior PERKINS Unavailable     [phone]  
[phone]  
   
                                Seymour UNDERWOOD, Júnior PERKINS Unavailable     [phone]  
[phone]  
   
                                David PT, Brenda Unavailable     [phone]  
[phone]  
   
                                KRISTINE CRAIG III Referring       Unavailable  
   
                                ADE VAZQUEZ Attending       Unavailable  
   
                                JÚNIOR AHUJA Referring       Unavailable  
   
                                JÚNIOR AHUJA Primary Care    Unavailable  
   
                                Lenka LAMB, Maurizio Unavailable     [phone]  
   
                                Nathalia Bernal PA-C Unavailable     [phone]  
   
                                David PT, Brenda Unavailable     [phone]  
[phone]  
   
                                AHUJA, JACKIE Primary Care    Unavailable  
   
                                JOSEHANS, MARIN Attending       Unavailable  
   
                                AHUJA, JACKIE Primary Care    Unavailable  
   
                                STEPHANS, MARIN Attending       Unavailable  
   
                                AHUJA, [facility]    Unavailable  
   
                                STEPHANS, MARIN Attending       Unavailable  
   
                                NIDHIKENYON MORTENSEN   Attending       Unavailable  
   
                                AHUJA, [facility]    Unavailable  
   
                                MAURIZIO OG    Referring       Unavailable  
   
                                AHUJA, [facility]    Unavailable  
   
                                STEPHANS, MARIN Attending       Unavailable  
   
                                AHUJA, [facility]    Unavailable  
   
                                AHUJA, [facility]    Unavailable  
   
                                LINGANNA, JUANITO ARAUZ Attending       Unavailable  
   
                                AHUJA, [facility]    Unavailable  
   
                                SUNIL, IRAIS CHACKO    Attending       Unavailable  
   
                                BARBER, IRAIS CHACKO    Admitting       Unavailable  
   
                                AHUJA, [facility]    Unavailable  
   
                                LINGANNA, JUANITO ARAUZ Referring       Unavailable  
   
                                LINGANNA, JUANITO ARAUZ Attending       Unavailable  
   
                                MASCI, TELLO RODRIGUEZ   Referring       Unavailable  
   
                                AHUJA, [facility]    Unavailable  
   
                                MASCI, TELLO RODRIGUEZ   Referring       Unavailable  
   
                                AHUJA, [facility]    Unavailable  
   
                                MASCI, TELLO RODRIGUEZ   Referring       Unavailable  
   
                                AHUJA, [facility]    Unavailable  
   
                                MASCI, TELLO MICHAEL   Referring       Unavailable  
   
                                AHUJA, [facility]    Unavailable  
   
                                AHUJA, [facility]    Unavailable  
   
                                STEPHANS, MARIN Referring       Unavailable  
   
                                MASCI, TELLO MICHAEL   Referring       Unavailable  
   
                                AHUJA, [facility]    Unavailable  
   
                                MASCI, TELLO A   Referring       Unavailable  
   
                                AHUJA, [facility]    Unavailable  
   
                                MASCI, TELLO A   Referring       Unavailable  
   
                                AHUJA, [facility]    Unavailable  
   
                                AHUJA, [facility]    Unavailable  
   
                                AHUJA, [facility]    Unavailable  
   
                                MASCI, TELLO A   Referring       Unavailable  
   
                                AHUJA, [facility]    Unavailable  
   
                                MASCI, TELLO A   Referring       Unavailable  
   
                                MASCI, TELLO MICHAEL   Attending       Unavailable  
   
                                AHUJA, [facility]    Unavailable  
   
                                AHUJA, [facility]    Unavailable  
   
                                AHUJA, [facility]    Unavailable  
   
                                STEPHANS, MARIN Referring       Unavailable  
   
                                AHUJA, [facility]    Unavailable  
   
                                AHUJA, JACKIE Referring       Unavailable  
   
                                AHUJA, JACKIE Attending       Unavailable  
   
                                MASCI, TELLO A   Referring       Unavailable  
   
                                AHUJA, [facility]    Unavailable  
   
                                MASCI, TELLO A   Attending       Unavailable  
   
                                MASCI, TELLO A   Referring       Unavailable  
   
                                AHUJA, [facility]    Unavailable  
   
                                MASCI, TELLO A   Referring       Unavailable  
   
                                AHUJA, [facility]    Unavailable  
   
                                MASCI, TELLO A   Referring       Unavailable  
   
                                AHUJA, JACKIE Primary Care    Unavailable  
   
                                AHUJA, [facility] Primary [facility]    Unavailable  
   
                                LENKA, MAURIZIO    Referring       Unavailable  
   
                                AHUJA, [facility]    Unavailable  
   
                                LENKA, MAURIZIO    Referring       Unavailable  
   
                                AHUJA, [facility]    Unavailable  
   
                                MARIN FISH Attending       Unavailable  
   
                                MASCI, TELLO A   Referring       Unavailable  
   
                                AHUJA, [facility]ACK[facility] Primary Care    Unavailable  
   
                                AHUJA, JACK[facility] Primary Care    Unavailable  
   
                                AHUJA, JACK[facility] Primary Care    Unavailable  
   
                                AHUJA, JACKIE Attending       Unavailable  
   
                                AHUJA, [facility] Primary [facility]    Unavailable  
   
                                LENKA, MAURIZIO    Referring       Unavailable  
   
                                AHUJA, [facility]    Unavailable  
   
                                LENKA, MAURIZIO    Referring       Unavailable  
   
                                AHUJA, [facility]    Unavailable  
   
                                LENKA, MAURIZIO    Referring       Unavailable  
  
  
  
Allergies  
  
  
                                                    Allergy   
Classification                          Reported   
Allergen(s)               Allergy Type              Date of   
Onset                     Reaction(s)               Facility  
   
                                                      
(20 sources)                            acetaminophen;   
Translations:   
[ACETAMINOPHEN]           Drug Allergy                
7                                       Other: See   
Comments                                [facility]   
Repository  
   
                                                      
(20 sources)                            atorvastatin;   
Translations:   
[ATORVASTATIN   
CALCIUM]                  Drug Allergy              04-  
5                         Itching                   [facility]   
Repository  
   
                                                      
(20 sources)                            Bee;   
Translations:   
[BEES]                                  Propensity to   
adverse   
reactions   
(disorder)                              04-  
5                         Anaphylaxis               [facility]   
Repository  
   
                                                      
(20 sources)                            HYDROcodone;   
Translations:   
[HYDROCODONE]             Drug Allergy                
7                         GI Upset                  [facility]   
Repository  
   
                                                      
(20 sources)                            montelukast;   
Translations:   
[MONTELUKAST   
SODIUM]                   Drug Allergy                
3                         Itching                   [facility]   
Repository  
   
                                                      
(20 sources)                            Strawberry;   
Translations:   
[STRAWBERRIES]                          Propensity to   
adverse   
reactions to   
food   
(disorder)                                
1                         Hives                     [facility]   
Repository  
  
  
  
Medications  
Current Medications  
  
  
  
                      Medication Drug Class(es) Dates      Sig (Normalized) Sig   
(Original)  
   
                                                    cephalexin 500 mg   
oral capsule  
(1 source)                              Cephalosporin   
Antibacterial                           Start:   
2023  
End:   
2023                              take 1 capsule by   
mouth four times   
daily                                   cephALEXin   
(KEFLEX) 500 mg   
capsule   
Indications:   
Venous stasis   
ulcer of other   
part of right   
lower leg limited   
to breakdown of   
skin without   
varicose veins   
(HCC) Take 1   
capsule by mouth   
four times daily   
for 7 days. 28   
capsule 0   
2023 Active  
  
  
  
                                          
   
                                          
   
                                          
   
                                          
   
                                          
   
                                          
   
                                          
  
  
  
Completed/Discontinued Medications  
  
  
  
                      Medication Drug Class(es) Dates      Sig (Normalized) Sig   
(Original)  
   
                                                    [id number]   
actuat albuterol   
0.09 mg/actuat   
metered dose   
inhaler  
(20 sources)                            beta2-Adrenergic   
Agonist                                 Start:   
2022                              take 2 puff(s) by   
inhalation every   
four hours as needed   
for wheezing                            albuterol HFA   
(PROVENTIL HFA,   
VENTOLIN HFA) 90   
mcg/actuation   
inhaler Inhale 2   
Puffs as   
instructed every   
4 hours as needed   
for   
wheezing/shortnes  
s of breath. 0   
2022 Active  
  
  
  
                                          
   
                                          
   
                                          
   
                                          
   
                                          
   
                                          
   
                                          
   
                                          
   
                                          
   
                                          
   
                                          
   
                                          
   
                                          
   
                                          
   
                                          
   
                                          
   
                                          
   
                                          
   
                                          
   
                                          
   
                                          
   
                                          
   
                                          
   
                                          
   
                                          
   
                                          
   
                                          
   
                                          
   
                                          
   
                                          
   
                                          
   
                                          
   
                                          
   
                                          
   
                                          
   
                                          
   
                                          
   
                                          
   
                                          
   
                                          
   
                                          
   
                                          
   
                                          
   
                                          
   
                                          
   
                                          
   
                                          
   
                                          
   
                                          
   
                                          
   
                                          
   
                                          
   
                                          
   
                                          
   
                                          
   
                                          
   
                                          
   
                                          
   
                                          
   
                                          
   
                                          
   
                                          
   
                                          
   
                                          
   
                                          
   
                                          
   
                                          
   
                                          
   
                                          
   
                                          
   
                                          
   
                                          
   
                                          
   
                                          
   
                                          
   
                                          
   
                                          
   
                                          
   
                                          
   
                                          
   
                                          
   
                                          
   
                                          
   
                                          
   
                                          
   
                                          
   
                                          
   
                                          
   
                                          
   
                                          
   
                                          
  
  
  
Problems  
Active Problems  
  
  
                      Problem Classification Problem    Date       Documented Da  
te Episodic/Chronic  
   
                                                    Adjustment disorders  
(20 sources)                            Adjustment disorder   
with depressed mood;   
Translations:   
[Adjustment disorder   
with depressed mood]                    Onset:   
  
2                         2012                Chronic  
   
                                                    Anxiety disorders  
(1 source)                              Claustrophobia;   
Translations:   
[Claustrophobia]                                            Chronic  
   
                                                    Asthma  
(20 sources)                            Exacerbation of   
asthma; Translations:   
[Unspecified asthma   
with (acute)   
exacerbation]                           Onset:   
11-  
2                         11-                Chronic  
   
                                                    Garcia  
(1 source)                              Second degree burn of   
left hand;   
Translations: [Burn of   
second degree of left   
hand, unspecified   
site, initial   
encounter]                                                  Episodic  
   
                                                    Cancer of liver and   
intrahepatic bile duct  
(20 sources)                            Liver cell carcinoma;   
Translations: [Liver   
cell carcinoma]                         Onset:   
  
3                                                   Chronic  
   
                                                    Cardiac dysrhythmias  
(20 sources)                            Atrial flutter;   
Translations:   
[Unspecified atrial   
flutter]                                Onset:   
  
3                         2023                Chronic  
   
                                                    Chronic kidney disease  
(20 sources)                            Chronic kidney disease   
stage 3; Translations:   
[CKD (chronic kidney   
disease) stage 3, GFR   
30-59 ml/min]                           Onset:   
  
6                         2021                Chronic  
   
                                                    Chronic kidney disease  
(1 source)                              Chronic kidney   
disease; Translations:   
[Stage 3a chronic   
kidney disease (HCC)]                   Onset:   
  
1                                                     
   
                                                    Chronic ulcer of skin  
(1 source)                              Non-pressure chronic   
ulcer of other part of   
unspecified lower leg   
limited to breakdown   
of skin; Translations:   
[Venous stasis ulcer   
of other part of lower   
leg limited to   
breakdown of skin,   
unspecified   
laterality,   
unspecified whether   
varicose veins present   
(HCC)]                                  Onset:   
  
3                                                   Chronic  
   
                                                    Coagulation and   
hemorrhagic disorders  
(20 sources)                            Platelet count below   
reference range;   
Translations:   
[Thrombocytopenia,   
unspecified]                            Onset:   
10-  
1                         2016                Chronic  
   
                                                    Coronary   
atherosclerosis and   
other heart disease  
(20 sources)                            Atherosclerotic heart   
disease of native   
coronary artery   
without angina   
pectoris;   
Translations:   
[Coronary   
atherosclerosis]                        Onset:   
10-  
1                         2021                Chronic  
   
                                                    Deficiency and other   
anemia  
(20 sources)                            Anemia of chronic   
disease; Translations:   
[Anemia in other   
chronic diseases   
classified elsewhere]                   Onset:   
  
7                         2019                Chronic  
   
                                                    Deficiency and other   
anemia  
(20 sources)                            Iron deficiency anemia   
due to blood loss;   
Translations: [Iron   
deficiency anemia   
secondary to blood   
loss (chronic)]                         Onset:   
  
3                         2023                Chronic  
   
                                                    Deficiency and other   
anemia  
(1 source)                              Iron deficiency anemia   
secondary to blood   
loss (chronic);   
Translations: [Iron   
deficiency anemia due   
to chronic blood loss]                  Onset:   
  
3                                                   Chronic  
   
                                                    Deficiency and other   
anemia  
(3 sources)                             Macrocytic anemia;   
Translations:   
[Nutritional anemia,   
unspecified]                                                Episodic  
   
                                                    Deficiency and other   
anemia  
(1 source)                              Iron deficiency   
anemia; Translations:   
[Iron deficiency   
anemia, unspecified]                                         Episodic  
   
                                                    Disorders of lipid   
metabolism  
(20 sources)                            Mixed hyperlipidemia;   
Translations: [Mixed   
hyperlipidemia]                         Onset:   
10-  
5                         2016                Chronic  
   
                                                    Esophageal disorders  
(20 sources)                            Gastroesophageal   
reflux disease;   
Translations:   
[Gastro-esophageal   
reflux disease without   
esophagitis]                            Onset:   
10-  
1                         2017                Chronic  
   
                                                    Essential hypertension  
(20 sources)                            Essential (primary)   
hypertension;   
Translations:   
[Essential   
hypertension]                           Onset:   
11-  
7                                                   Chronic  
   
                                                    Gastrointestinal   
hemorrhage  
(1 source)                              Melena; Translations:   
[Melena]                                10-          Episodic  
   
                                                    Genitourinary   
congenital anomalies  
(20 sources)                            Multiple renal cysts;   
Translations:   
[Polycystic kidney,   
unspecified]                            Onset:   
  
0                         2010                Chronic  
   
                                                    Genitourinary symptoms   
and ill-defined   
conditions  
(20 sources)                            Urge incontinence of   
urine; Translations:   
[Urge incontinence]                     Onset:   
  
9                         2019                Chronic  
   
                                                    Gout and other crystal   
arthropathies  
(20 sources)                            Gout; Translations:   
[Gout, unspecified]                     2017          Chronic  
   
                                                    Heart valve disorders  
(20 sources)                            Presence of prosthetic   
heart valve;   
Translations: [Aortic   
valve disorder]                         Onset:   
  
7                         2017                Chronic  
   
                                                    Neoplasms of   
unspecified nature or   
uncertain behavior  
(8 sources)                             Monoclonal gammopathy   
(clinical);   
Translations:   
[Monoclonal   
gammopathy]                             Onset:   
  
3                                                   Chronic  
   
                                                    Other and unspecified   
benign neoplasm  
(1 source)                              History of polyp of   
colon; Translations:   
[Personal history of   
colonic polyps]                                             Episodic  
   
                                                    Other connective   
tissue disease  
(1 source)                              Muscle weakness;   
Translations: [Muscle   
weakness   
(generalized)]                                              Episodic  
   
                                                    Other diseases of   
veins and lymphatics  
(1 source)                              Stasis dermatitis;   
Translations: [Venous   
insufficiency   
(chronic)   
(peripheral)]                                               Episodic  
   
                                                    Other diseases of   
veins and lymphatics  
(1 source)                              Skin ulcer of calf ;   
Translations: [Venous   
insufficiency   
(chronic)   
(peripheral)]                                               Episodic  
   
                                                    Other diseases of   
veins and lymphatics  
(1 source)                              Ulcer of lower   
extremity;   
Translations: [Venous   
insufficiency   
(chronic)   
(peripheral)]                                               Episodic  
   
                                                    Other endocrine   
disorders  
(2 sources)                             Hyperparathyroidism;   
Translations:   
[Hyperparathyroidism,   
unspecified]                                                Chronic  
   
                                                    Other gastrointestinal   
disorders  
(20 sources)                            Malabsorption - iron;   
Translations:   
[Intestinal   
malabsorption,   
unspecified]                            Onset:   
  
3                         2023                Chronic  
   
                                                    Other gastrointestinal   
disorders  
(1 source)                              Intestinal   
malabsorption,   
unspecified;   
Translations: [Iron   
malabsorption]                          Onset:   
  
3                                                   Chronic  
   
                                                    Other gastrointestinal   
disorders  
(1 source)                              H/O: liver disease;   
Translations:   
[Personal history of   
other diseases of the   
digestive system]                                           Episodic  
   
                                                    Other injuries and   
conditions due to   
external causes  
(1 source)                              History of fall;   
Translations: [History   
of falling]                                                 Episodic  
   
                                                    Other liver diseases  
(8 sources)                             Cirrhosis of liver;   
Translations:   
[Unspecified cirrhosis   
of liver]                                                   Chronic  
   
                                                    Other liver diseases  
(20 sources)                            Lesion of liver;   
Translations: [Liver   
disease, unspecified]                   Onset:   
  
3                                                   Chronic  
   
                                                    Other liver diseases  
(1 source)                              Disease of liver;   
Translations: [Liver   
disease, unspecified]                     10-          Chronic  
   
                                                    Other liver diseases  
(2 sources)                             Liver disease,   
unspecified;   
Translations: [Liver   
disease]                                Onset:   
  
3                                                   Chronic  
   
                                                    Other liver diseases  
(1 source)                              Unspecified cirrhosis   
of liver;   
Translations:   
[Cirrhosis of liver   
without ascites,   
unspecified hepatic   
cirrhosis type (HCC)]                   Onset:   
  
3                                                   Chronic  
   
                                                    Other liver diseases  
(3 sources)                             Liver mass;   
Translations:   
[Hepatomegaly, not   
elsewhere classified]                                         Episodic  
   
                                                    Other nervous system   
disorders  
(20 sources)                            Polyneuropathy;   
Translations:   
[Polyneuropathy,   
unspecified]                            Onset:   
  
5                         2017                Chronic  
   
                                                    Other nervous system   
disorders  
(1 source)                              Polyneuropathy,   
unspecified;   
Translations:   
[Peripheral   
polyneuropathy]                         Onset:   
  
7                                                   Chronic  
   
                                                    Other nutritional;   
endocrine; and   
metabolic disorders  
(20 sources)                            Obese class II;   
Translations:   
[Obesity, unspecified]                  Onset:   
  
1                         2021                Chronic  
   
                                                    Other nutritional;   
endocrine; and   
metabolic disorders  
(1 source)                              Metabolic syndrome X;   
Translations:   
[Metabolic syndrome]                                         Chronic  
   
                                                    Other nutritional;   
endocrine; and   
metabolic disorders  
(1 source)                              Hypercalcemia;   
Translations:   
[Hypercalcemia]                                             Chronic  
   
                                                    Other nutritional;   
endocrine; and   
metabolic disorders  
(18 sources)                            Obese class I;   
Translations:   
[Obesity, unspecified]                  Onset:   
  
3                         2023                Chronic  
   
                                                    Other nutritional;   
endocrine; and   
metabolic disorders  
(1 source)                              Obesity, unspecified;   
Translations:   
[Obesity, Class I, BMI   
30-34.9]                                Onset:   
  
3                                                   Chronic  
   
                                                    Other nutritional;   
endocrine; and   
metabolic disorders  
(1 source)                              Hypercalcemia;   
Translations:   
[Hypercalcemia]                         Onset:   
  
3                                                   Chronic  
   
                                                    Other skin disorders  
(1 source)                              Eruption;   
Translations: [Rash   
and other nonspecific   
skin eruption]                                              Episodic  
   
                                                    Paralysis  
(2 sources)                             Hemiplegia,   
unspecified affecting   
left nondominant side;   
Translations:   
[Hemiplegia,   
unspecified affecting   
left nondominant side   
(HCC)]                                  Onset:   
01-  
3                                                   Chronic  
   
                                                    Residual codes;   
unclassified  
(20 sources)                            Obstructive sleep   
apnea syndrome;   
Translations:   
[Obstructive sleep   
apnea (adult)   
(pediatric)]                            Onset:   
10-  
5                         2011                Chronic  
   
                                                    Residual codes;   
unclassified  
(1 source)                              Edema of lower   
extremity;   
Translations:   
[Localized edema]                                           Episodic  
   
                                                    Residual codes;   
unclassified  
(1 source)                              Treatment not   
available;   
Translations:   
[Procedure and   
treatment not carried   
out for other reasons]                     2023          Episodic  
   
                                                    Skin and subcutaneous   
tissue infections  
(2 sources)                             Site-specific   
infective disorders of   
skin; Translations:   
[Local infection of   
the skin and   
subcutaneous tissue,   
unspecified]                                                Episodic  
   
                                                    Superficial injury;   
contusion  
(1 source)                              Contusion of hip;   
Translations:   
[Contusion of   
unspecified hip,   
subsequent encounter]                                         Episodic  
   
                                                    Unclassified  
(1 source)                Unknown / UNK(Unknown)    Onset:   
11-  
7                                                     
   
                                                    Unclassified  
(1 source)                              Radiotherapy   
On-treatment Visit                      Onset:   
  
3                                                     
  
  
Past or Other Problems  
  
  
                      Problem Classification Problem    Date       Documented Da  
te Episodic/Chronic  
   
                                                    Deficiency and other   
anemia  
(1 source)                              Nutritional anemia,   
unspecified;   
Translations:   
[Macrocytic anemia]                     Onset:   
2023                                          Episodic  
   
                                                    Diabetes mellitus   
without complication  
(20 sources)                            Impaired fasting   
glycemia;   
Translations:   
[Impaired fasting   
glucose]                                Onset:   
2006                Episodic  
   
                                                    Other and unspecified   
benign neoplasm  
(20 sources)                            Adenomatous polyp   
of colon ;   
Translations:   
[Benign neoplasm of   
colon, unspecified]                     Onset:   
2011                Episodic  
   
                                                    Other and unspecified   
benign neoplasm  
(1 source)                              Personal history of   
colonic polyps;   
Translations:   
[History of colonic   
polyps]                                 Onset:   
2023                                          Episodic  
   
                                                    Other connective tissue   
disease  
(20 sources)                            Recurrent falls ;   
Translations:   
[Repeated falls]                        Onset:   
10-                                          Episodic  
   
                                                    Other connective tissue   
disease  
(1 source)                              Repeated falls;   
Translations:   
[Frequent falls]                        Onset:   
10-                                          Episodic  
   
                                                    Other gastrointestinal   
disorders  
(20 sources)                            Splenomegaly;   
Translations:   
[Splenomegaly, not   
elsewhere   
classified]                             Onset:   
09-                02-                Episodic  
   
                                                    Other gastrointestinal   
disorders  
(1 source)                              Splenomegaly, not   
elsewhere   
classified;   
Translations:   
[Splenomegaly]                          Onset:   
2015                                          Episodic  
   
                                                    Other liver diseases  
(1 source)                              Hepatomegaly, not   
elsewhere   
classified;   
Translations:   
[Liver mass]                            Onset:   
2023                                          Episodic  
   
                                                    Other nervous system   
disorders  
(20 sources)                            Abnormal gait;   
Translations:   
[Unspecified   
abnormalities of   
gait and mobility]                      Onset:   
2016                Episodic  
   
                                                    Other nervous system   
disorders  
(1 source)                              Unspecified   
abnormalities of   
gait and mobility;   
Translations:   
[Abnormality of   
gait]                                   Onset:   
2016                                          Episodic  
   
                                                    Residual codes;   
unclassified  
(1 source)                              Other specified   
personal risk   
factors, not   
elsewhere   
classified;   
Translations: [At   
risk for   
polypharmacy]                           Onset:   
2023                                          Episodic  
   
                                                    Varicose veins of lower   
extremity  
(20 sources)                            Skin ulcer;   
Translations:   
[Varicose veins of   
unspecified lower   
extremity with   
ulcer of   
unspecified site]                       Onset:   
2023                Episodic  
  
  
  
Results  
  
  
                      Test Name  Value      Interpretation Reference Range Facil  
ity  
  
  
  
                                          
   
                                          
   
                                          
   
                                          
   
                                          
   
                                          
   
                                          
   
                                          
   
                                          
   
                                          
   
                                          
   
                                          
   
                                          
   
                                          
   
                                          
   
                                          
   
                                          
   
                                          
   
                                          
   
                                          
   
                                          
   
                                          
   
                                          
   
                                          
   
                                          
   
                                          
   
                                          
   
                                          
   
                                          
   
                                          
   
                                          
   
                                          
   
                                          
   
                                          
   
                                          
   
                                          
   
                                          
   
                                          
   
                                          
   
                                          
   
                                          
   
                                          
   
                                          
   
                                          
   
                                          
   
                                          
   
                                          
   
                                          
   
                                          
   
                                          
   
                                          
   
                                          
   
                                          
   
                                          
   
                                          
   
                                          
   
                                          
   
                                          
   
                                          
   
                                          
   
                                          
   
                                          
   
                                          
   
                                          
   
                                          
   
                                          
   
                                          
   
                                          
   
                                          
   
                                          
   
                                          
   
                                          
   
                                          
   
                                          
   
                                          
   
                                          
   
                                          
   
                                          
   
                                          
   
                                          
   
                                          
   
                                          
   
                                          
   
                                          
   
                                          
   
                                          
   
                                          
   
                                          
   
                                          
   
                                          
   
                                          
   
                                          
   
                                          
   
                                          
   
                                          
   
                                          
   
                                          
   
                                          
   
                                          
   
                                          
   
                                          
   
                                          
   
                                          
   
                                          
   
                                          
   
                                          
   
                                          
   
                                          
   
                                          
   
                                          
   
                                          
   
                                          
   
                                          
   
                                          
   
                                          
   
                                          
   
                                          
   
                                          
   
                                          
   
                                          
   
                                          
   
                                          
   
                                          
   
                                          
   
                                          
   
                                          
   
                                          
   
                                          
   
                                          
   
                                          
   
                                          
   
                                          
   
                                          
   
                                          
   
                                          
   
                                          
   
                                          
   
                                          
   
                                          
   
                                          
   
                                          
   
                                          
   
                                          
   
                                          
   
                                          
   
                                          
   
                                          
   
                                          
   
                                          
   
                                          
   
                                          
   
                                          
   
                                          
   
                                          
   
                                          
   
                                          
   
                                          
   
                                          
   
                                          
   
                                          
   
                                          
   
                                          
   
                                          
   
                                          
   
                                          
   
                                          
   
                                          
   
                                          
   
                                          
   
                                          
   
                                          
   
                                          
   
                                          
   
                                          
   
                                          
   
                                          
   
                                          
   
                                          
   
                                          
   
                                          
   
                                          
   
                                          
   
                                          
   
                                          
   
                                          
   
                                          
   
                                          
   
                                          
   
                                          
   
                                          
   
                                          
   
                                          
   
                                          
   
                                          
   
                                          
   
                                          
   
                                          
   
                                          
   
                                          
   
                                          
   
                                          
   
                                          
   
                                          
   
                                          
   
                                          
   
                                          
   
                                          
   
                                          
   
                                          
   
                                          
   
                                          
   
                                          
   
                                          
   
                                          
   
                                          
   
                                          
   
                                          
   
                                          
   
                                          
   
                                          
   
                                          
   
                                          
   
                                          
   
                                          
   
                                          
   
                                          
   
                                          
   
                                          
   
                                          
   
                                          
   
                                          
   
                                          
   
                                          
   
                                          
   
                                          
   
                                          
   
                                          
   
                                          
   
                                          
   
                                          
   
                                          
   
                                          
   
                                          
   
                                          
   
                                          
   
                                          
   
                                          
   
                                          
   
                                          
   
                                          
   
                                          
   
                                          
   
                                          
   
                                          
   
                                          
   
                                          
   
                                          
   
                                          
   
                                          
   
                                          
   
                                          
   
                                          
   
                                          
   
                                          
   
                                          
   
                                          
   
                                          
   
                                          
   
                                          
   
                                          
   
                                          
   
                                          
   
                                          
   
                                          
   
                                          
   
                                          
   
                                          
   
                                          
   
                                          
   
                                          
   
                                          
   
                                          
   
                                          
   
                                          
   
                                          
   
                                          
   
                                          
   
                                          
   
                                          
   
                                          
   
                                          
   
                                          
   
                                          
   
                                          
   
                                          
   
                                          
   
                                          
   
                                          
   
                                          
   
                                          
   
                                          
   
                                          
   
                                          
   
                                          
   
                                          
   
                                          
   
                                          
   
                                          
   
                                          
   
                                          
   
                                          
   
                                          
   
                                          
   
                                          
   
                                          
   
                                          
   
                                          
   
                                          
   
                                          
   
                                          
   
                                          
   
                                          
   
                                          
   
                                          
   
                                          
   
                                          
   
                                          
   
                                          
   
                                          
   
                                          
   
                                          
   
                                          
   
                                          
   
                                          
   
                                          
   
                                          
   
                                          
   
                                          
   
                                          
   
                                          
   
                                          
   
                                          
   
                                          
   
                                          
   
                                          
   
                                          
   
                                          
   
                                          
   
                                          
   
                                          
   
                                          
   
                                          
   
                                          
   
                                          
   
                                          
   
                                          
   
                                          
   
                                          
   
                                          
   
                                          
   
                                          
   
                                          
   
                                          
   
                                          
   
                                          
   
                                          
   
                                          
   
                                          
   
                                          
   
                                          
   
                                          
   
                                          
   
                                          
   
                                          
   
                                          
   
                                          
   
                                          
   
                                          
   
                                          
   
                                          
   
                                          
   
                                          
   
                                          
   
                                          
   
                                          
   
                                          
   
                                          
   
                                          
   
                                          
   
                                          
   
                                          
   
                                          
   
                                          
   
                                          
   
                                          
   
                                          
   
                                          
   
                                          
   
                                          
   
                                          
   
                                          
   
                                          
   
                                          
   
                                          
   
                                          
   
                                          
   
                                          
   
                                          
   
                                          
   
                                          
   
                                          
   
                                          
   
                                          
   
                                          
   
                                          
   
                                          
   
                                          
   
                                          
   
                                          
   
                                          
   
                                          
   
                                          
   
                                          
   
                                          
   
                                          
   
                                          
   
                                          
   
                                          
   
                                          
   
                                          
   
                                          
   
                                          
   
                                          
   
                                          
   
                                          
   
                                          
   
                                          
   
                                          
   
                                          
   
                                          
   
                                          
   
                                          
   
                                          
   
                                          
   
                                          
   
                                          
   
                                          
   
                                          
   
                                          
   
                                          
   
                                          
   
                                          
   
                                          
   
                                          
   
                                          
   
                                          
   
                                          
   
                                          
   
                                          
   
                                          
   
                                          
   
                                          
   
                                          
   
                                          
   
                                          
   
                                          
   
                                          
   
                                          
   
                                          
   
                                          
   
                                          
   
                                          
   
                                          
   
                                          
   
                                          
   
                                          
   
                                          
   
                                          
   
                                          
   
                                          
   
                                          
   
                                          
   
                                          
   
                                          
   
                                          
   
                                          
   
                                          
   
                                          
   
                                          
   
                                          
   
                                          
   
                                          
   
                                          
   
                                          
   
                                          
   
                                          
   
                                          
   
                                          
   
                                          
   
                                          
   
                                          
   
                                          
   
                                          
   
                                          
   
                                          
   
                                          
   
                                          
   
                                          
   
                                          
   
                                          
   
                                          
   
                                          
   
                                          
   
                                          
   
                                          
   
                                          
   
                                          
   
                                          
   
                                          
   
                                          
   
                                          
   
                                          
   
                                          
   
                                          
   
                                          
   
                                          
   
                                          
   
                                          
   
                                          
   
                                          
   
                                          
   
                                          
   
                                          
   
                                          
   
                                          
   
                                          
   
                                          
   
                                          
   
                                          
   
                                          
   
                                          
   
                                          
   
                                          
   
                                          
   
                                          
   
                                          
   
                                          
   
                                          
   
                                          
   
                                          
   
                                          
   
                                          
   
                                          
   
                                          
   
                                          
   
                                          
   
                                          
   
                                          
   
                                          
   
                                          
   
                                          
   
                                          
   
                                          
   
                                          
   
                                          
   
                                          
   
                                          
   
                                          
   
                                          
   
                                          
   
                                          
   
                                          
   
                                          
   
                                          
   
                                          
   
                                          
   
                                          
   
                                          
   
                                          
   
                                          
   
                                          
   
                                          
   
                                          
   
                                          
   
                                          
   
                                          
   
                                          
   
                                          
   
                                          
   
                                          
   
                                          
   
                                          
   
                                          
   
                                          
   
                                          
   
                                          
   
                                          
   
                                          
   
                                          
   
                                          
   
                                          
   
                                          
   
                                          
   
                                          
   
                                          
   
                                          
   
                                          
   
                                          
   
                                          
   
                                          
   
                                          
   
                                          
   
                                          
   
                                          
   
                                          
   
                                          
   
                                          
   
                                          
   
                                          
   
                                          
   
                                          
   
                                          
   
                                          
   
                                          
   
                                          
   
                                          
   
                                          
   
                                          
   
                                          
   
                                          
   
                                          
   
                                          
   
                                          
   
                                          
   
                                          
   
                                          
   
                                          
   
                                          
   
                                          
   
                                          
   
                                          
   
                                          
   
                                          
   
                                          
   
                                          
   
                                          
   
                                          
   
                                          
   
                                          
   
                                          
   
                                          
   
                                          
   
                                          
   
                                          
   
                                          
   
                                          
   
                                          
   
                                          
   
                                          
   
                                          
   
                                          
   
                                          
   
                                          
   
                                          
   
                                          
   
                                          
   
                                          
   
                                          
   
                                          
   
                                          
   
                                          
   
                                          
   
                                          
   
                                          
   
                                          
   
                                          
   
                                          
   
                                          
   
                                          
   
                                          
   
                                          
   
                                          
   
                                          
   
                                          
   
                                          
   
                                          
   
                                          
   
                                          
   
                                          
   
                                          
   
                                          
   
                                          
   
                                          
   
                                          
   
                                          
   
                                          
   
                                          
   
                                          
   
                                          
   
                                          
   
                                          
   
                                          
   
                                          
   
                                          
   
                                          
   
                                          
   
                                          
   
                                          
   
                                          
   
                                          
   
                                          
   
                                          
   
                                          
   
                                          
   
                                          
   
                                          
   
                                          
   
                                          
   
                                          
   
                                          
   
                                          
   
                                          
   
                                          
   
                                          
   
                                          
   
                                          
   
                                          
   
                                          
   
                                          
   
                                          
   
                                          
   
                                          
   
                                          
   
                                          
   
                                          
   
                                          
   
                                          
   
                                          
   
                                          
   
                                          
   
                                          
   
                                          
   
                                          
   
                                          
   
                                          
   
                                          
   
                                          
   
                                          
   
                                          
   
                                          
   
                                          
   
                                          
   
                                          
   
                                          
   
                                          
   
                                          
   
                                          
   
                                          
   
                                          
   
                                          
   
                                          
   
                                          
   
                                          
   
                                          
   
                                          
   
                                          
   
                                          
   
                                          
   
                                          
   
                                          
   
                                          
   
                                          
   
                                          
   
                                          
   
                                          
   
                                          
   
                                          
   
                                          
   
                                          
   
                                          
   
                                          
   
                                          
   
                                          
   
                                          
   
                                          
   
                                          
   
                                          
   
                                          
   
                                          
   
                                          
   
                                          
   
                                          
   
                                          
   
                                          
   
                                          
   
                                          
   
                                          
   
                                          
   
                                          
   
                                          
   
                                          
   
                                          
   
                                          
   
                                          
   
                                          
   
                                          
   
                                          
   
                                          
   
                                          
   
                                          
   
                                          
   
                                          
   
                                          
   
                                          
   
                                          
   
                                          
   
                                          
   
                                          
   
                                          
   
                                          
   
                                          
   
                                          
   
                                          
   
                                          
   
                                          
   
                                          
   
                                          
   
                                          
   
                                          
   
                                          
   
                                          
   
                                          
   
                                          
   
                                          
   
                                          
   
                                          
   
                                          
   
                                          
   
                                          
   
                                          
   
                                          
   
                                          
   
                                          
   
                                          
   
                                          
   
                                          
   
                                          
   
                                          
   
                                          
   
                                          
   
                                          
   
                                          
   
                                          
   
                                          
   
                                          
   
                                          
   
                                          
   
                                          
   
                                          
   
                                          
   
                                          
   
                                          
   
                                          
   
                                          
   
                                          
   
                                          
   
                                          
   
                                          
   
                                          
   
                                          
   
                                          
   
                                          
   
                                          
   
                                          
   
                                          
   
                                          
   
                                          
   
                                          
   
                                          
   
                                          
   
                                          
   
                                          
   
                                          
   
                                          
   
                                          
   
                                          
   
                                          
   
                                          
   
                                          
   
                                          
   
                                          
   
                                          
   
                                          
   
                                          
   
                                          
   
                                          
   
                                          
   
                                          
   
                                          
   
                                          
   
                                          
   
                                          
   
                                          
   
                                          
   
                                          
   
                                          
   
                                          
   
                                          
   
                                          
   
                                          
   
                                          
   
                                          
   
                                          
   
                                          
   
                                          
   
                                          
   
                                          
   
                                          
   
                                          
   
                                          
   
                                          
   
                                          
   
                                          
   
                                          
   
                                          
   
                                          
   
                                          
   
                                          
   
                                          
   
                                          
   
                                          
   
                                          
   
                                          
   
                                          
   
                                          
   
                                          
   
                                          
   
                                          
   
                                          
   
                                          
   
                                          
   
                                          
   
                                          
   
                                          
   
                                          
   
                                          
   
                                          
   
                                          
   
                                          
   
                                          
  
  
  
Vital Signs  
  
  
                      Date Time  Vital Sign Value      [facility]  
[facility]  
   
                                                    10-   
14:          Body height         177.8 cm            Juanito Kaufman MD  
Work Phone:   
[phone]                          [facility]  
   
                                                    10-   
14:          Body temperature    97.81 [degF]        Juanito Kaumfan MD  
Work Phone:   
[phone]                          [facility]  
   
                                                    10-   
14:          Body weight         111.86 kg           Juanito Kaufman MD  
Work Phone:   
[phone]                          [facility]  
   
                                                    10-   
14:                              Diastolic blood   
pressure                  89 mm[Hg]                 Juanito Kaufman MD  
Work Phone:   
[phone]                          [facility]  
   
                                                    10-   
14:          Heart rate          129 /min            Juanito Kaufman MD  
Work Phone:   
[phone]                          [facility]  
   
                                                    10-   
14:                              SaO2% (BldA) [Mass   
fraction]                 98 %                      Juanito Kaufman MD  
Work Phone:   
[phone]                          [facility]  
   
                                                    10-   
14:                              Systolic blood   
pressure                  118 mm[Hg]                Juanito Kaufman MD  
Work Phone:   
[phone]                          [facility]  
   
                                                    2023   
09:          Body temperature    97.5 [degF]         [facility]  
Work Phone:   
[phone]                          [facility]  
   
                                                    2023   
09:                              Diastolic blood   
pressure                  79 mm[Hg]                 [facility]  
Work Phone:   
[phone]                          [facility]  
   
                                                    2023   
09:          Heart rate          100 /min            [facility]  
Work Phone:   
[phone]                          [facility]  
   
                                                    2023   
09:                              Systolic blood   
pressure                  131 mm[Hg]                [facility]  
Work Phone:   
[phone]                          [facility]  
   
                                                    2023   
11:          Body temperature    97 [degF]           [facility]  
Work Phone:   
[phone]                          [facility]  
   
                                                    2023   
11:                              Diastolic blood   
pressure                  77 mm[Hg]                 [facility]  
Work Phone:   
[phone]                          [facility]  
   
                                                    2023   
11:          Heart rate          70 /min             [facility]  
Work Phone:   
[phone]                          [facility]  
   
                                                    2023   
11:                              Systolic blood   
pressure                  127 mm[Hg]                [facility]  
Work Phone:   
[phone]                          [facility]  
   
                                                    2023   
09:          Body temperature    97.9 [degF]         [facility]  
Work Phone:   
[phone]                          [facility]  
   
                                                    2023   
09:                              Diastolic blood   
pressure                  89 mm[Hg]                 [facility]  
Work Phone:   
[phone]                          [facility]  
   
                                                    2023   
09:          Heart rate          71 /min             [facility]  
Work Phone:   
[phone]                          [facility]  
   
                                                    2023   
09:                              Systolic blood   
pressure                  133 mm[Hg]                [facility]  
Work Phone:   
[phone]                          [facility]  
   
                                                    2023   
09:          Body temperature    98.6 [degF]         [facility]  
Work Phone:   
[phone]                          [facility]  
   
                                                    2023   
09:                              Diastolic blood   
pressure                  65 mm[Hg]                 [facility]  
Work Phone:   
[phone]                          [facility]  
   
                                                    2023   
09:          Heart rate          65 /min             [facility]  
Work Phone:   
[phone]                          [facility]  
   
                                                    2023   
09:                              SaO2% (BldA) [Mass   
fraction]                 95 %                      [facility]  
Work Phone:   
[phone]                          [facility]  
   
                                                    2023   
09:                              Systolic blood   
pressure                  108 mm[Hg]                [facility]  
Work Phone:   
[phone]                          [facility]  
   
                                                    2023   
09:          Body temperature    97.9 [degF]         [facility]  
Work Phone:   
[phone]                          [facility]  
   
                                                    2023   
09:                              Diastolic blood   
pressure                  68 mm[Hg]                 [facility]  
Work Phone:   
[phone]                          [facility]  
   
                                                    2023   
09:          Heart rate          60 /min             [facility]  
Work Phone:   
[phone]                          [facility]  
   
                                                    2023   
09:                              SaO2% (BldA) [Mass   
fraction]                 98 %                      [facility]  
Work Phone:   
[phone]                          [facility]  
   
                                                    2023   
09:                              Systolic blood   
pressure                  107 mm[Hg]                [facility]  
Work Phone:   
[phone]                          [facility]  
   
                                                    2023   
15:          Body height         171 cm              Tello Masci DO  
Work Phone:   
[phone]                          [facility]  
   
                                                    2023   
15:          Body temperature    97.9 [degF]         Tello Masci DO  
Work Phone:   
[phone]                          [facility]  
   
                                                    2023   
15:          Body weight         109.77 kg           Tello Masci DO  
Work Phone:   
[phone]                          [facility]  
   
                                                    2023   
15:                              Diastolic blood   
pressure                  63 mm[Hg]                 Tello Masci DO  
Work Phone:   
[phone]                          [facility]  
   
                                                    2023   
15:          Heart rate          70 /min             Tello Masci DO  
Work Phone:   
[phone]                          [facility]  
   
                                                    2023   
15:                              SaO2% (BldA) [Mass   
fraction]                 100 %                     Tello Masci DO  
Work Phone:   
[phone]                          [facility]  
   
                                                    2023   
15:                              Systolic blood   
pressure                  108 mm[Hg]                Tello Masci DO  
Work Phone:   
[phone]                          [facility]  
   
                                                    2023   
14:          Body temperature    97.9 [degF]         [facility]  
Work Phone:   
[phone]                          [facility]  
   
                                                    2023   
14:          Body weight         107.32 kg           [facility]  
Work Phone:   
[phone]                          [facility]  
   
                                                    2023   
14:                              Diastolic blood   
pressure                  65 mm[Hg]                 [facility]  
Work Phone:   
[phone]                          [facility]  
   
                                                    2023   
14:          Heart rate          75 /min             [facility]  
Work Phone:   
[phone]                          [facility]  
   
                                                    2023   
14:          Respiratory rate    16 /min             [facility]  
Work Phone:   
[phone]                          [facility]  
   
                                                    2023   
14:                              SaO2% (BldA) [Mass   
fraction]                 96 %                      [facility]  
Work Phone:   
[phone]                          [facility]  
   
                                                    2023   
14:                              Systolic blood   
pressure                  111 mm[Hg]                [facility]  
Work Phone:   
[phone]                          [facility]  
   
                                                    2023   
09:          Body height         177.8 cm            Juanito Kaufman MD  
Work Phone:   
[phone]                          [facility]  
   
                                                    2023   
09:          Body temperature    97.3 [degF]         Juanito Kaufman MD  
Work Phone:   
[phone]                          [facility]  
   
                                                    2023   
09:          Body weight         111.95 kg           Juanito Kaufman MD  
Work Phone:   
[phone]                          [facility]  
   
                                                    2023   
09:                              Diastolic blood   
pressure                  60 mm[Hg]                 Juanito Kaufman MD  
Work Phone:   
[phone]                          [facility]  
   
                                                    2023   
09:          Heart rate          77 /min             Juanito Kaufman MD  
Work Phone:   
[phone]                          [facility]  
   
                                                    2023   
09:                              SaO2% (BldA) [Mass   
fraction]                 98 %                      Juanito Kaufman MD  
Work Phone:   
[phone]                          [facility]  
   
                                                    2023   
09:                              Systolic blood   
pressure                  113 mm[Hg]                Juanito Kaufman MD  
Work Phone:   
[phone]                          [facility]  
   
                                                    2023   
10:          Body weight         108.86 kg           Marin Fish MD  
Work Phone:   
[phone]                          [facility]  
   
                                                    2023   
10:                              Diastolic blood   
pressure                  58 mm[Hg]                 Marin Fish MD  
Work Phone:   
[phone]                          [facility]  
   
                                                    2023   
10:          Heart rate          70 /min             Marin Fish MD  
Work Phone:   
[phone]                          [facility]  
   
                                                    2023   
10:          Respiratory rate    18 /min             Marin Fish MD  
Work Phone:   
[phone]                          [facility]  
   
                                                    2023   
10:                              SaO2% (BldA) [Mass   
fraction]                 89 %                      Marin Fish MD  
Work Phone:   
[phone]                          [facility]  
   
                                                    2023   
10:                              Systolic blood   
pressure                  106 mm[Hg]                Marin Fish MD  
Work Phone:   
[phone]                          [facility]  
   
                                                    2023   
18:          Body weight         109.32 kg           Júnior Ahuja MD  
Work Phone:   
[phone]                          [facility]  
   
                                                    2023   
18:                              Diastolic blood   
pressure                  72 mm[Hg]                 Júnior Ahuja MD  
Work Phone:   
[phone]                          [facility]  
   
                                                    2023   
18:          Heart rate          72 /min             Júnior Ahuja MD  
Work Phone:   
[phone]                          [facility]  
   
                                                    2023   
18:                              SaO2% (BldA) [Mass   
fraction]                 100 %                     Júnior Ahuja MD  
Work Phone:   
[phone]                          [facility]  
   
                                                    2023   
18:                              Systolic blood   
pressure                  134 mm[Hg]                Júnior Ahuja MD  
Work Phone:   
[phone]                          [facility]  
   
                                                    03-   
14:          Body temperature    98.01 [degF]        Marin Rankin MD  
Work Phone:   
[phone]                          [facility]  
   
                                                    03-   
14:          Body weight         108.23 kg           Marin Rankin MD  
Work Phone:   
[phone]                          [facility]  
   
                                                    03-   
14:                              Diastolic blood   
pressure                  82 mm[Hg]                 Marin Rankin MD  
Work Phone:   
[phone]                          [facility]  
   
                                                    03-   
14:          Heart rate          92 /min             Marin Rankin MD  
Work Phone:   
[phone]                          [facility]  
   
                                                    03-   
14:          Respiratory rate    18 /min             Marin Rankin MD  
Work Phone:   
[phone]                          [facility]  
   
                                                    03-   
14:                              SaO2% (BldA) [Mass   
fraction]                 98 %                      Marin Rankin MD  
Work Phone:   
[phone]                          [facility]  
   
                                                    03-   
14:                              Systolic blood   
pressure                  132 mm[Hg]                Marin Rankin MD  
Work Phone:   
[phone]                          [facility]  
   
                                                    2023   
09:          Body temperature    97.3 [degF]         Tello Masci DO  
Work Phone:   
[phone]                          [facility]  
   
                                                    2023   
09:          Body weight         108.18 kg           Tello Masci DO  
Work Phone:   
[phone]                          [facility]  
   
                                                    2023   
09:                              Diastolic blood   
pressure                  72 mm[Hg]                 Tello Masci DO  
Work Phone:   
[phone]                          [facility]  
   
                                                    2023   
09:          Heart rate          66 /min             Tello Masci DO  
Work Phone:   
[phone]                          [facility]  
   
                                                    2023   
09:                              SaO2% (BldA) [Mass   
fraction]                 99 %                      Tello Masci DO  
Work Phone:   
[phone]                          [facility]  
   
                                                    2023   
09:                              Systolic blood   
pressure                  122 mm[Hg]                Tello Masci DO  
Work Phone:   
[phone]                          [facility]  
   
                                                    02-   
15:          Body temperature    98.2 [degF]         [facility]  
Work Phone:   
[phone]                          [facility]  
   
                                                    02-   
15:                              Diastolic blood   
pressure                  92 mm[Hg]                 [facility]  
Work Phone:   
[phone]                          [facility]  
   
                                                    02-   
15:          Heart rate          75 /min             [facility]  
Work Phone:   
[phone]                          [facility]  
   
                                                    02-   
15:          Respiratory rate    16 /min             [facility]  
Work Phone:   
[phone]                          [facility]  
   
                                                    02-   
15:                              SaO2% (BldA) [Mass   
fraction]                 100 %                     [facility]  
Work Phone:   
[phone]                          [facility]  
   
                                                    02-   
15:                              Systolic blood   
pressure                  129 mm[Hg]                [facility]  
Work Phone:   
[phone]                          [facility]  
   
                                                    2023   
15:          Body temperature    96.91 [degF]        [facility]  
Work Phone:   
[phone]                          [facility]  
   
                                                    2023   
15:                              Diastolic blood   
pressure                  74 mm[Hg]                 [facility]  
Work Phone:   
[phone]                          [facility]  
   
                                                    2023   
15:          Heart rate          73 /min             [facility]  
Work Phone:   
[phone]                          [facility]  
   
                                                    2023   
15:          Respiratory rate    18 /min             [facility]  
Work Phone:   
[phone]                          [facility]  
   
                                                    2023   
15:                              SaO2% (BldA) [Mass   
fraction]                 100 %                     [facility]  
Work Phone:   
[phone]                          [facility]  
   
                                                    2023   
15:                              Systolic blood   
pressure                  131 mm[Hg]                [facility]  
Work Phone:   
[phone]                          [facility]  
   
                                                    02-   
14:          Body temperature    97.2 [degF]         [facility]  
Work Phone:   
[phone]                          [facility]  
   
                                                    02-   
14:                              Diastolic blood   
pressure                  74 mm[Hg]                 [facility]  
Work Phone:   
[phone]                          [facility]  
   
                                                    02-   
14:          Heart rate          91 /min             [facility]  
Work Phone:   
[phone]                          [facility]  
   
                                                    02-   
14:          Respiratory rate    16 /min             [facility]  
Work Phone:   
[phone]                          [facility]  
   
                                                    02-   
14:                              SaO2% (BldA) [Mass   
fraction]                 98 %                      [facility]  
Work Phone:   
[phone]                          [facility]  
   
                                                    02-   
14:                              Systolic blood   
pressure                  136 mm[Hg]                [facility]  
Work Phone:   
[phone]                          [facility]  
   
                                                    2023   
15:          Body temperature    97.59 [degF]        [facility]  
Work Phone:   
[phone]                          [facility]  
   
                                                    2023   
15:                              Diastolic blood   
pressure                  90 mm[Hg]                 [facility]  
Work Phone:   
[phone]                          [facility]  
   
                                                    2023   
15:          Heart rate          88 /min             [facility]  
Work Phone:   
[phone]                          [facility]  
   
                                                    2023   
15:          Respiratory rate    18 /min             [facility]  
Work Phone:   
[phone]                          [facility]  
   
                                                    2023   
15:                              SaO2% (BldA) [Mass   
fraction]                 99 %                      [facility]  
Work Phone:   
[phone]                          [facility]  
   
                                                    2023   
15:                              Systolic blood   
pressure                  151 mm[Hg]                [facility]  
Work Phone:   
[phone]                          [facility]  
   
                                                    2023   
15:          Body temperature    97.2 [degF]         [facility]  
Work Phone:   
[phone]                          [facility]  
   
                                                    2023   
15:                              Diastolic blood   
pressure                  72 mm[Hg]                 [facility]  
Work Phone:   
[phone]                          [facility]  
   
                                                    2023   
15:          Heart rate          87 /min             [facility]  
Work Phone:   
[phone]                          [facility]  
   
                                                    2023   
15:                              Systolic blood   
pressure                  144 mm[Hg]                [facility]  
Work Phone:   
[phone]                          [facility]  
   
                                                    2023   
15:          Body temperature    96.6 [degF]         [facility]  
Work Phone:   
[phone]                          [facility]  
   
                                                    2023   
15:                              Diastolic blood   
pressure                  77 mm[Hg]                 [facility]  
Work Phone:   
[phone]                          [facility]  
   
                                                    2023   
15:          Heart rate          86 /min             [facility]  
Work Phone:   
[phone]                          [facility]  
   
                                                    2023   
15:          Respiratory rate    16 /min             [facility]  
Work Phone:   
[phone]                          [facility]  
   
                                                    2023   
15:                              SaO2% (BldA) [Mass   
fraction]                 98 %                      [facility]  
Work Phone:   
[phone]                          [facility]  
   
                                                    2023   
15:                              Systolic blood   
pressure                  147 mm[Hg]                [facility]  
Work Phone:   
[phone]                          [facility]  
   
                                                    2023   
14:          Body temperature    97.11 [degF]        [facility]  
Work Phone:   
[phone]                          [facility]  
   
                                                    2023   
14:                              Diastolic blood   
pressure                  69 mm[Hg]                 [facility]  
Work Phone:   
[phone]                          [facility]  
   
                                                    2023   
14:          Heart rate          70 /min             [facility]  
Work Phone:   
[phone]                          [facility]  
   
                                                    2023   
14:                              SaO2% (BldA) [Mass   
fraction]                 96 %                      [facility]  
Work Phone:   
[phone]                          [facility]  
   
                                                    2023   
14:                              Systolic blood   
pressure                  126 mm[Hg]                [facility]  
Work Phone:   
[phone]                          [facility]  
   
                                                    2023   
14:          Body temperature    97.81 [degF]        [facility]  
Work Phone:   
[phone]                          [facility]  
   
                                                    2023   
14:                              Diastolic blood   
pressure                  68 mm[Hg]                 [facility]  
Work Phone:   
[phone]                          [facility]  
   
                                                    2023   
14:          Heart rate          61 /min             [facility]  
Work Phone:   
[phone]                          [facility]  
   
                                                    2023   
14:                              SaO2% (BldA) [Mass   
fraction]                 100 %                     [facility]  
Work Phone:   
[phone]                          [facility]  
   
                                                    2023   
14:                              Systolic blood   
pressure                  128 mm[Hg]                [facility]  
Work Phone:   
[phone]                          [facility]  
   
                                                    2023   
15:          Body temperature    97.2 [degF]         Júnior Ahuja MD  
Work Phone:   
[phone]                          [facility]  
   
                                                    2023   
15:          Body weight         111.58 kg           Júnior Ahuja MD  
Work Phone:   
[phone]                          [facility]  
   
                                                    2023   
15:                              Diastolic blood   
pressure                  64 mm[Hg]                 Júnior Ahuja MD  
Work Phone:   
[phone]                          [facility]  
   
                                                    2023   
15:          Heart rate          76 /min             Júnior Ahuja MD  
Work Phone:   
[phone]                          [facility]  
   
                                                    2023   
15:          Respiratory rate    16 /min             Júnior Ahuja MD  
Work Phone:   
[phone]                          [facility]  
   
                                                    2023   
15:                              Systolic blood   
pressure                  114 mm[Hg]                Júnior Ahuja MD  
Work Phone:   
[phone]                          [facility]  
   
                                                    2023   
13:          Body height         170.2 cm            Nathalia [place] PA-C  
Work Phone:   
[phone]                          [facility]  
   
                                                    2023   
13:          Body temperature    98.91 [degF]        Nathalia [place] PA-C  
Work Phone:   
[phone]                          [facility]  
   
                                                    2023   
13:          Body weight         114.31 kg           Nathalia Dolores PA-C  
Work Phone:   
[phone]                          [facility]  
   
                                                    2023   
13:                              Diastolic blood   
pressure                  80 mm[Hg]                 Nathalia [place] PA-C  
Work Phone:   
[phone]                          [facility]  
   
                                                    2023   
13:          Heart rate          110 /min            Nathalia [place] PA-C  
Work Phone:   
[phone]                          [facility]  
   
                                                    2023   
13:                              SaO2% (BldA) [Mass   
fraction]                 94 %                      Nathalia Dolores PA-C  
Work Phone:   
[phone]                          [facility]  
   
                                                    2023   
13:                              Systolic blood   
pressure                  140 mm[Hg]                Nathalia [place] PA-C  
Work Phone:   
[phone]                          [facility]  
   
                                                    2022   
15:          Body temperature    98.01 [degF]        Tello Tanneri DO  
Work Phone:   
[phone]                          [facility]  
   
                                                    2022   
15:          Body weight         110.9 kg            Tello Masci DO  
Work Phone:   
[phone]                          [facility]  
   
                                                    2022   
15:                              Diastolic blood   
pressure                  81 mm[Hg]                 Tello Masci DO  
Work Phone:   
[phone]                          [facility]  
   
                                                    2022   
15:          Heart rate          93 /min             Tello Masci DO  
Work Phone:   
[phone]                          [facility]  
   
                                                    2022   
15:                              Systolic blood   
pressure                  134 mm[Hg]                Tello Masci DO  
Work Phone:   
[phone]                          [facility]  
   
                                                    10-   
13:          Body temperature    97.39 [degF]        Júnior Ahuja MD  
Work Phone:   
[phone]                          [facility]  
   
                                                    10-   
13:          Body weight         112.95 kg           Júnior Ahuja MD  
Work Phone:   
[phone]                          [facility]  
   
                                                    10-   
13:                              Diastolic blood   
pressure                  56 mm[Hg]                 Júnior Ahuja MD  
Work Phone:   
[phone]                          [facility]  
   
                                                    10-   
13:          Heart rate          64 /min             Júnior Ahuja MD  
Work Phone:   
[phone]                          [facility]  
   
                                                    10-   
13:          Respiratory rate    16 /min             Júnior Ahuja MD  
Work Phone:   
[phone]                          [facility]  
   
                                                    10-   
13:                              Systolic blood   
pressure                  108 mm[Hg]                Júnior Ahuja MD  
Work Phone:   
[phone]                          [facility]  
   
                                                    10-   
10:          Body temperature    97.81 [degF]        Brenda Hernandez PT  
Work Phone:   
[phone]                          [facility]  
   
                                                    10-   
10:                              Diastolic blood   
pressure                  58 mm[Hg]                 Brenda Hernandez PT  
Work Phone:   
[phone]                          [facility] [facility]  
   
                                                    10-   
10:          Heart rate          62 /min             Brenda Hernandez PT  
Work Phone:   
[phone]                          [facility]  
   
                                                    10-   
10:          Respiratory rate    16 /min             Brenda Hernandez PT  
Work Phone:   
[phone]                          [facility]  
   
                                                    10-   
10:                              SaO2% (BldA) [Mass   
fraction]                 99 %                      Brenda Hernandez PT  
Work Phone:   
[phone]                          [facility]  
   
                                                    10-   
10:                              Systolic blood   
pressure                  112 mm[Hg]                Brenda Hernandez PT  
Work Phone:   
[phone]                          [facility]  
   
                                                    10-   
09:          Body temperature    98.6 [degF]         Lalitha Pily PTA  
Work Phone:   
[phone]                          [facility]  
   
                                                    10-   
09:                              Diastolic blood   
pressure                  60 mm[Hg]                 Lalitha Pily PTA  
Work Phone:   
[phone]                          [facility]  
   
                                                    10-   
09:          Heart rate          68 /min             Lalitha Pily PTA  
Work Phone:   
[phone]                          [facility]  
   
                                                    10-   
09:          Respiratory rate    18 /min             Lalitha Pily PTA  
Work Phone:   
[phone]                          [facility]  
   
                                                    10-   
09:                              SaO2% (BldA) [Mass   
fraction]                 99 %                      Lalitha Pily PTA  
Work Phone:   
[phone]                          [facility]  
   
                                                    10-   
09:                              Systolic blood   
pressure                  112 mm[Hg]                Lalitha Pily PTA  
Work Phone:   
[phone]                          [facility]  
   
                                                    10-   
14:          Body temperature    98.01 [degF]        Lalitha Pily PTA  
Work Phone:   
[phone]                          [facility]  
   
                                                    10-   
14:                              Diastolic blood   
pressure                  60 mm[Hg]                 Lalitha Pily PTA  
Work Phone:   
[phone]                          [facility]  
   
                                                    10-   
14:          Heart rate          64 /min             Lalitha Pily PTA  
Work Phone:   
[phone]                          [facility]  
   
                                                    10-   
14:          Respiratory rate    18 /min             Lalitha Pily PTA  
Work Phone:   
[phone]                          [facility]  
   
                                                    10-   
14:                              SaO2% (BldA) [Mass   
fraction]                 98 %                      Lalitha Pily PTA  
Work Phone:   
[phone]                          [facility]  
   
                                                    10-   
14:                              Systolic blood   
pressure                  110 mm[Hg]                Lalitha Pily PTA  
Work Phone:   
[phone]                          [facility]  
   
                                                    2022   
11:          Body temperature    98.2 [degF]         Neida Gutierrez PT  
Work Phone:   
[phone]                          [facility]  
   
                                                    2022   
11:                              Diastolic blood   
pressure                  60 mm[Hg]                 Neida Gutierrez PT  
Work Phone:   
[phone]                          [facility]  
   
                                                    2022   
11:          Heart rate          68 /min             Neida Gutierrez PT  
Work Phone:   
[phone]                          [facility]  
   
                                                    2022   
11:          Respiratory rate    16 /min             Neida Gutierrez PT  
Work Phone:   
[phone]                          [facility]  
   
                                                    2022   
11:                              SaO2% (BldA) [Mass   
fraction]                 100 %                     Neida Gutierrez PT  
Work Phone:   
[phone]                          [facility]  
   
                                                    2022   
11:                              Systolic blood   
pressure                  120 mm[Hg]                Neida Gutierrez PT  
Work Phone:   
[phone]                          [facility]  
   
                                                    2022   
09:          Body temperature    97.39 [degF]        Lalitha Pily PTA  
Work Phone:   
[phone]                          [facility]  
   
                                                    2022   
09:                              Diastolic blood   
pressure                  64 mm[Hg]                 Lalitha Pily PTA  
Work Phone:   
[phone]                          [facility]  
   
                                                    2022   
09:          Heart rate          63 /min             Lalitha Pily PTA  
Work Phone:   
[phone]                          [facility]  
   
                                                    2022   
09:          Respiratory rate    18 /min             Lalitha Pily PTA  
Work Phone:   
[phone]                          [facility]  
   
                                                    2022   
09:                              SaO2% (BldA) [Mass   
fraction]                 97 %                      Lalitha Pily PTA  
Work Phone:   
[phone]                          [facility]  
   
                                                    2022   
09:                              Systolic blood   
pressure                  112 mm[Hg]                Lalitha Pily PTA  
Work Phone:   
[phone]                          [facility]  
   
                                                    2022   
10:                              Diastolic blood   
pressure                  60 mm[Hg]                 Brenda Poole-Asif PT  
Work Phone:   
[phone]                          [facility]  
   
                                                    2022   
10:          Heart rate          78 /min             Brenda Hernandez PT  
Work Phone:   
[phone]                          [facility]  
   
                                                    2022   
10:          Respiratory rate    18 /min             Brenda Hernandez PT  
Work Phone:   
[phone]                          [facility]  
   
                                                    2022   
10:                              SaO2% (BldA) [Mass   
fraction]                 99 %                      Brenda Hernandez PT  
Work Phone:   
[phone]                          [facility]  
   
                                                    2022   
10:                              Systolic blood   
pressure                  108 mm[Hg]                Brenda Poole-Asif PT  
Work Phone:   
[phone]                          [facility]  
   
                                                    2022   
09:          Body temperature    97.9 [degF]         Brenda Poole-Gold PT  
Work Phone:   
[phone]                          [facility]  
   
                                                    2022   
14:          Body temperature    97.39 [degF]        Lalitha Pily PTA  
Work Phone:   
[phone]                          [facility]  
   
                                                    2022   
14:                              Diastolic blood   
pressure                  68 mm[Hg]                 Lalitha Pily PTA  
Work Phone:   
[phone]                          [facility]  
   
                                                    2022   
14:          Heart rate          76 /min             Lalitha Pily PTA  
Work Phone:   
[phone]                          [facility]  
   
                                                    2022   
14:          Respiratory rate    18 /min             Lalitha Pily PTA  
Work Phone:   
[phone]                          [facility]  
   
                                                    2022   
14:                              SaO2% (BldA) [Mass   
fraction]                 99 %                      Lalitha Pily PTA  
Work Phone:   
[phone]                          [facility]  
   
                                                    2022   
14:                              Systolic blood   
pressure                  118 mm[Hg]                Lalitha Pily PTA  
Work Phone:   
[phone]                          [facility]  
   
                                                    2022   
02:          Body temperature    97.3 [degF]         Brenda   
Halderman-Gold PT  
Work Phone:   
[phone]                          [facility]  
   
                                                    2022   
02:                              Diastolic blood   
pressure                  50 mm[Hg]                 Brenda   
Halderman-Gold PT  
Work Phone:   
[phone]                          [facility]  
   
                                                    2022   
02:          Heart rate          55 /min             Brenda Veraman-Gold PT  
Work Phone:   
[phone]                          [facility]  
   
                                                    2022   
02:          Respiratory rate    16 /min             Brenda   
Halderman-Gold PT  
Work Phone:   
[phone]                          [facility]  
   
                                                    2022   
02:                              SaO2% (BldA) [Mass   
fraction]                 99 %                      Brenda   
Halderman-Gold PT  
Work Phone:   
[phone]                          [facility]  
   
                                                    2022   
02:                              Systolic blood   
pressure                  100 mm[Hg]                Brenda   
Halderman-Gold PT  
Work Phone:   
[phone]                          [facility]  
   
                                                    2022   
14:          Body temperature    98.2 [degF]         Lalitha Pily PTA  
Work Phone:   
[phone]                          [facility]  
   
                                                    2022   
14:                              Diastolic blood   
pressure                  78 mm[Hg]                 Lalitha Pily PTA  
Work Phone:   
[phone]                          [facility]  
   
                                                    2022   
14:          Heart rate          78 /min             Lalitha Pily PTA  
Work Phone:   
[phone]                          [facility]  
   
                                                    2022   
14:          Respiratory rate    18 /min             Lalitha Pily PTA  
Work Phone:   
[phone]                          [facility]  
   
                                                    2022   
14:                              SaO2% (BldA) [Mass   
fraction]                 97 %                      Lalitha Pily PTA  
Work Phone:   
[phone]                          [facility]  
   
                                                    2022   
14:                              Systolic blood   
pressure                  134 mm[Hg]                Lalitha Pily PTA  
Work Phone:   
[phone]                          [facility]  
   
                                                    2022   
10:          Body temperature    97.81 [degF]        Lalitha Pily PTA  
Work Phone:   
[phone]                          [facility]  
   
                                                    2022   
10:                              Diastolic blood   
pressure                  70 mm[Hg]                 Lalitha Pily PTA  
Work Phone:   
[phone]                          [facility]  
   
                                                    2022   
10:          Heart rate          56 /min             Lalitha Pily PTA  
Work Phone:   
[phone]                          [facility]  
   
                                                    2022   
10:          Respiratory rate    18 /min             Lalitha Pily PTA  
Work Phone:   
[phone]                          [facility]  
   
                                                    2022   
10:                              SaO2% (BldA) [Mass   
fraction]                 98 %                      Lalitha Pily PTA  
Work Phone:   
[phone]                          [facility]  
   
                                                    2022   
10:                              Systolic blood   
pressure                  126 mm[Hg]                Lalitha Pily PTA  
Work Phone:   
[phone]                          [facility]  
   
                                                    2022   
10:          Body temperature    97.3 [degF]         Brenda Hernandez PT  
Work Phone:   
[phone]                          [facility]  
   
                                                    2022   
10:                              Diastolic blood   
pressure                  60 mm[Hg]                 Brenda Hernandez PT  
Work Phone:   
[phone]                          [facility]  
   
                                                    2022   
10:          Heart rate          68 /min             Brenda Hernandez PT  
Work Phone:   
[phone]                          [facility]  
   
                                                    2022   
10:          Respiratory rate    18 /min             Brenda   
Halderman-Gold PT  
Work Phone:   
[phone]                          [facility]  
   
                                                    2022   
10:                              SaO2% (BldA) [Mass   
fraction]                 99 %                      Brenda   
Halderman-Gold PT  
Work Phone:   
[phone]                          [facility]  
   
                                                    2022   
10:                              Systolic blood   
pressure                  110 mm[Hg]                Brenda   
Halderman-Gold PT  
Work Phone:   
[phone]                          [facility]  
   
                                                    2022   
15:          Body temperature    97.9 [degF]         Brenda   
Halderman-Gold PT  
Work Phone:   
[phone]                          [facility]  
   
                                                    2022   
15:                              Diastolic blood   
pressure                  64 mm[Hg]                 Brenda   
Halderman-Gold PT  
Work Phone:   
[phone]                          [facility]  
   
                                                    2022   
15:          Heart rate          73 /min             Brenda   
Halderman-Gold PT  
Work Phone:   
[phone]                          [facility]  
   
                                                    2022   
15:          Respiratory rate    18 /min             Brenda   
Halderman-Gold PT  
Work Phone:   
[phone]                          [facility]  
   
                                                    2022   
15:                              SaO2% (BldA) [Mass   
fraction]                 99 %                      Brenda   
Halderman-Gold PT  
Work Phone:   
[phone]                          [facility]  
   
                                                    2022   
15:                              Systolic blood   
pressure                  112 mm[Hg]                Brenda   
Halderman-Gold PT  
Work Phone:   
[phone]                          [facility]  
   
                                                    2022   
09:          Body temperature    97.59 [degF]        Júnior Ahuja MD  
Work Phone:   
[phone]                          [facility]  
   
                                                    2022   
09:          Body weight         113.4 kg            Júnior Ahuja MD  
Work Phone:   
[phone]                          [facility]  
   
                                                    2022   
09:                              Diastolic blood   
pressure                  66 mm[Hg]                 Júnior Ahuja MD  
Work Phone:   
[phone]                          [facility]  
   
                                                    2022   
09:          Heart rate          60 /min             Júnior Ahuja MD  
Work Phone:   
[phone]                          [facility]  
   
                                                    2022   
09:          Respiratory rate    16 /min             Júnior Ahuja MD  
Work Phone:   
[phone]                          [facility]  
   
                                                    2022   
09:                              Systolic blood   
pressure                  130 mm[Hg]                Júnior Ahuja MD  
Work Phone:   
[phone]                          [facility]  
   
                                                    2022   
12:                              Diastolic blood   
pressure                  76 mm[Hg]                 Mi Nurse  
Work Phone:   
[phone]                          [facility]  
   
                                                    2022   
12:          Heart rate          62 /min             Mi Nurse  
Work Phone:   
[phone]                          [facility]  
   
                                                    2022   
12:                              Systolic blood   
pressure                  128 mm[Hg]                Mi Nurse  
Work Phone:   
[phone]                          [facility]  
  
  
  
Encounters  
  
  
                          Encounter Date Encounter Type Care Provider Facility  
   
                                Start: 2024 Telephone encounter Juanito Kaufman MD  
Work Phone: [phone]              [facility]ology  
  
  
  
                                          
   
                                          
   
                                          
   
                                          
   
                                          
   
                                          
   
                                          
   
                                          
   
                                          
   
                                          
   
                                          
   
                                          
   
                                          
   
                                          
   
                                          
   
                                          
   
                                          
   
                                          
   
                                          
   
                                          
   
                                          
   
                                          
   
                                          
   
                                          
   
                                          
   
                                          
   
                                          
   
                                          
   
                                          
   
                                          
   
                                          
   
                                          
   
                                          
   
                                          
   
                                          
   
                                          
   
                                          
   
                                          
   
                                          
   
                                          
   
                                          
   
                                          
   
                                          
   
                                          
   
                                          
   
                                          
   
                                          
   
                                          
   
                                          
   
                                          
   
                                          
   
                                          
   
                                          
   
                                          
   
                                          
   
                                          
   
                                          
   
                                          
   
                                          
   
                                          
   
                                          
   
                                          
   
                                          
   
                                          
   
                                          
   
                                          
   
                                          
   
                                          
   
                                          
   
                                          
   
                                          
   
                                          
   
                                          
   
                                          
   
                                          
   
                                          
   
                                          
   
                                          
   
                                          
   
                                          
   
                                          
   
                                          
   
                                          
   
                                          
   
                                          
   
                                          
   
                                          
   
                                          
   
                                          
   
                                          
   
                                          
   
                                          
   
                                          
   
                                          
   
                                          
   
                                          
   
                                          
   
                                          
   
                                          
   
                                          
   
                                          
   
                                          
   
                                          
   
                                          
   
                                          
   
                                          
   
                                          
   
                                          
   
                                          
   
                                          
   
                                          
   
                                          
   
                                          
   
                                          
   
                                          
   
                                          
   
                                          
   
                                          
   
                                          
   
                                          
   
                                          
   
                                          
   
                                          
   
                                          
   
                                          
   
                                          
   
                                          
   
                                          
   
                                          
   
                                          
   
                                          
   
                                          
   
                                          
   
                                          
   
                                          
   
                                          
   
                                          
   
                                          
   
                                          
   
                                          
   
                                          
   
                                          
   
                                          
   
                                          
   
                                          
   
                                          
   
                                          
   
                                          
   
                                          
   
                                          
   
                                          
   
                                          
   
                                          
   
                                          
   
                                          
   
                                          
   
                                          
   
                                          
   
                                          
   
                                          
   
                                          
   
                                          
   
                                          
   
                                          
   
                                          
   
                                          
   
                                          
   
                                          
   
                                          
   
                                          
   
                                          
   
                                          
   
                                          
   
                                          
   
                                          
   
                                          
   
                                          
   
                                          
   
                                          
   
                                          
   
                                          
   
                                          
   
                                          
   
                                          
   
                                          
   
                                          
   
                                          
   
                                          
   
                                          
   
                                          
   
                                          
   
                                          
   
                                          
   
                                          
   
                                          
   
                                          
   
                                          
   
                                          
   
                                          
   
                                          
   
                                          
   
                                          
   
                                          
   
                                          
   
                                          
   
                                          
   
                                          
   
                                          
   
                                          
   
                                          
   
                                          
   
                                          
   
                                          
   
                                          
   
                                          
   
                                          
   
                                          
   
                                          
   
                                          
   
                                          
   
                                          
   
                                          
   
                                          
   
                                          
   
                                          
  
  
  
Procedures  
  
  
                          Date         Procedure    Procedure Detail Performing   
Clinician  
   
                                        Start: 10-   Mri abdomen w/o &   
w/contrast material                                 Marin Fish MD  
Work Phone:   
[phone]  
   
                          Start: 2023 Colonoscopy               [facility]  
   
                                        Start: 2023   Mri abdomen w/o &   
w/contrast material                                 Maurizio Og PA-C  
Work Phone:   
[phone]  
   
                                        Start: 2022   Us abdominal real ti  
me   
w/image limited                                     Tello MICHAEL Ortega DO  
Work Phone:   
[phone]  
  
  
  
Plan of Treatment  
  
  
                          Date         Care Activity Detail       Author  
   
                          Start: 2027 Urine microalbumin profile            
    [facility]  
  
  
  
                                          
   
                                          
   
                                          
   
                                          
   
                                          
   
                                          
   
                                          
   
                                          
   
                                          
   
                                          
   
                                          
   
                                          
   
                                          
   
                                          
   
                                          
   
                                          
   
                                          
   
                                          
   
                                          
   
                                          
   
                                          
   
                                          
   
                                          
   
                                          
   
                                          
   
                                          
   
                                          
   
                                          
   
                                          
   
                                          
   
                                          
   
                                          
   
                                          
   
                                          
   
                                          
   
                                          
   
                                          
   
                                          
   
                                          
   
                                          
   
                                          
   
                                          
   
                                          
   
                                          
   
                                          
   
                                          
   
                                          
   
                                          
   
                                          
   
                                          
   
                                          
   
                                          
   
                                          
   
                                          
   
                                          
   
                                          
   
                                          
   
                                          
   
                                          
   
                                          
   
                                          
   
                                          
   
                                          
   
                                          
   
                                          
   
                                          
   
                                          
   
                                          
   
                                          
   
                                          
   
                                          
   
                                          
   
                                          
   
                                          
   
                                          
   
                                          
   
                                          
   
                                          
   
                                          
   
                                          
   
                                          
   
                                          
   
                                          
   
                                          
   
                                          
   
                                          
   
                                          
   
                                          
   
                                          
   
                                          
   
                                          
   
                                          
   
                                          
   
                                          
   
                                          
   
                                          
   
                                          
   
                                          
   
                                          
   
                                          
   
                                          
   
                                          
   
                                          
   
                                          
   
                                          
   
                                          
   
                                          
   
                                          
   
                                          
   
                                          
   
                                          
   
                                          
   
                                          
   
                                          
   
                                          
   
                                          
   
                                          
   
                                          
   
                                          
   
                                          
   
                                          
   
                                          
   
                                          
   
                                          
   
                                          
   
                                          
   
                                          
   
                                          
   
                                          
   
                                          
   
                                          
   
                                          
   
                                          
   
                                          
   
                                          
   
                                          
   
                                          
   
                                          
   
                                          
   
                                          
   
                                          
   
                                          
   
                                          
   
                                          
   
                                          
   
                                          
   
                                          
   
                                          
   
                                          
   
                                          
   
                                          
   
                                          
   
                                          
   
                                          
   
                                          
   
                                          
   
                                          
   
                                          
   
                                          
   
                                          
   
                                          
   
                                          
   
                                          
   
                                          
   
                                          
   
                                          
   
                                          
   
                                          
   
                                          
   
                                          
   
                                          
   
                                          
   
                                          
   
                                          
   
                                          
   
                                          
  
  
  
Immunizations  
  
  
                      Immunization Date Immunization Notes      Care Provider Fa  
cili  
   
                                        10-          influenza (aIIV4)   
vaccine, age 65+ yr,   
quadrivalent, PF (FLUAD   
QUAD)                                               Juanito Kaufman MD  
Work Phone:   
[phone]                          [facility]  
Work Phone:   
[phone]  
   
                                        10-          influenza, high dose  
   
seasonal,   
preservative-free                                   Júnior Ahuja MD  
Work Phone:   
[phone]                          [facility]  
Work Phone:   
[phone]  
   
                                        10-          influenza virus vacc  
ine,   
unspecified formulation                             [facility]  
Work Phone:   
[phone]                          [facility]  
   
                                        2021          COVID-19 vaccine, ag  
e   
12+ yr (PFIZER-BIONTECH   
- PURPLE TOP)                                       [facility]  
Work Phone:   
[phone]                          [facility]  
Work Phone:   
[phone]  
   
                                        2021          influenza (aIIV4)   
vaccine, age 65+ yr,   
quadrivalent, PF (FLUAD   
QUADRIVALENT)                                       [facility]  
Work Phone:   
[phone]                          [facility]  
Work Phone:   
[phone]  
   
                                        2021          COVID-19 vaccine, ag  
e   
12+ yr (PFIZER-BIONTECH   
- PURPLE TOP)                                       [facility]  
Work Phone:   
[phone]                          [facility]  
Work Phone:   
[phone]  
   
                                        2021          COVID-19 vaccine, ag  
e   
12+ yr (PFIZER-BIONTECH   
- PURPLE TOP)                                       [facility]  
Work Phone:   
[phone]                          [facility]  
Work Phone:   
[phone]  
   
                                        2020          influenza, high dose  
   
seasonal,   
preservative-free                                   [facility]  
Work Phone:   
[phone]                          [facility]  
Work Phone:   
[phone]  
   
                                        2019          influenza, high dose  
   
seasonal,   
preservative-free                                   [facility]  
Work Phone:   
[phone]                          [facility]  
   
                                        10-          influenza, injectabl  
e,   
quadrivalent,   
preservative free                                   Juanito Kaufman MD  
Work Phone:   
[phone]                          [facility]  
Work Phone:   
[phone]  
   
                                        10-          influenza, seasonal,  
   
injectable, preservative   
free                                                [facility]  
Work Phone:   
[phone]                          [facility]  
Work Phone:   
[phone]  
   
                                        2018          zoster vaccine   
recombinant                                         [facility]  
Work Phone:   
[phone]                          [facility]  
Work Phone:   
[phone]  
   
                                        2018          influenza, high dose  
   
seasonal,   
preservative-free                                   [facility]  
Work Phone:   
[phone]                          [facility]  
   
                                        2018          zoster vaccine   
recombinant                                         [facility]  
Work Phone:   
[phone]                          [facility]  
Work Phone:   
[phone]  
   
                                        2018          pneumococcal   
polysaccharide vaccine,   
23 valhunter Ahuja MD  
Work Phone:   
[phone]                          [facility]  
Work Phone:   
[phone]  
   
                                        2017          influenza, high dose  
   
seasonal,   
preservative-free                                   [facility]  
Work Phone:   
[phone]                          [facility]  
   
                                        2017          pneumococcal conjuga  
te   
vaccine, 13 valhunter Ahuja MD  
Work Phone:   
[phone]                          [facility]  
Work Phone:   
[phone]  
   
                                        2017          tetanus and diphther  
ia   
toxoids, adsorbed,   
preservative free, for   
adult use (5 Lf of   
tetanus toxoid and 2 Lf   
of diphtheria toxoid)                               [facility]  
Work Phone:   
[phone]                          [facility]  
   
                                        2017          tetanus toxoid, redu  
gloria   
diphtheria toxoid, and   
acellular pertussis   
vaccine, adsorbed                                   [facility]  
Work Phone:   
[phone]                          [facility]  
Work Phone:   
[phone]  
   
                                        10-          influenza, high dose  
   
seasonal,   
preservative-free                                   [facility]  
Work Phone:   
[phone]                          [facility]  
   
                                        10-          pneumococcal conjuga  
te   
vaccine, 13 valhunter Ahuja MD  
Work Phone:   
[phone]                          [facility]  
Work Phone:   
[phone]  
   
                                        2015          pneumococcal conjuga  
te   
vaccine, 13 valent                                  [facility]  
Work Phone:   
[phone]                          [facility]  
   
                                        2014          influenza, seasonal,  
   
injectable                                          [facility]  
Work Phone:   
[phone]                          [facility]  
   
                                        2013          influenza virus vacc  
ine,   
unspecified formulation                             [facility]  
Work Phone:   
[phone]                          [facility]  
   
                                        10-          influenza virus vacc  
ine,   
unspecified formulation                             [facility]  
Work Phone:   
[phone]                          [facility]  
Work Phone:   
[phone]  
   
                                        2009          influenza virus vacc  
ine,   
unspecified formulation                             [facility]  
Work Phone:   
[phone]                          [facility]  
Work Phone:   
[phone]  
   
                                        2007          influenza virus vacc  
ine,   
unspecified formulation                             [facility]  
Work Phone:   
[phone]                          [facility]  
   
                                        2007          tetanus and diphther  
ia   
toxoids, adsorbed,   
preservative free, for   
adult use (2 Lf of   
tetanus toxoid and 2 Lf   
of diphtheria toxoid)                               [facility]  
Work Phone:   
[phone]                          [facility]  
   
                                        2006          influenza virus vacc  
ine,   
unspecified formulation                             [facility]  
Work Phone:   
[phone]                          [facility]  
Work Phone:   
[phone]  
   
                                        2006          pneumococcal   
polysaccharide vaccine,   
23 valent                                           [facility]  
Work Phone:   
[phone]                          [facility]  
Work Phone:   
[phone]  
   
                                        11-          influenza virus vacc  
ine,   
unspecified formulation                             [facility]  
Work Phone:   
[phone]                          [facility] [facility]  
Work Phone:   
[phone]  
  
  
  
Payers  
  
  
                          Date         Payer Category Payer        Policy ID  
   
                                2019      Unknown         [employer]  
 SUPPLEMENT   
[id number]   
2019-Present   
[phone] [address]   
[place] [address]   
Indemnity                               [id number]   
[id number]  
[id number]  
   
                                2019      Unknown         [employer]IC[employer]  
 SUPPLEMENT   
[id number]   
2019-Present   
[phone] [address]   
[place] [address]   
Indemnity                               [id number]  
[id number]  
   
                          2019   Unknown                   [id number]  
   
                          2005   Medicare                    
   
                                2005      Medicare        MEDICARE MEDICAR  
[facility] [employer] [facility]   
[id number] 2005-Present   
[phone] [address] [address]   
[place] [address]   
Medicare                                [id number]   
[id number]  
[id number]  
   
                          2005   Medicare                  [id number]  
   
                                       Medicare                  [id number]  
  
  
  
Social History  
  
  
                          Date         Type         Detail       Facility  
   
                                                            Tobacco smoking stat  
us   
NHIS                      Never smoked tobacco      [facility]  
Work Phone:   
[phone]  
   
                                                    Start: 2022  
End: 2023           Alcohol intake            Current non-drinker   
of alcohol (finding)                    [facility]  
   
                                                    Start: 2022  
End: 2023                         History SDOH Alcohol   
Frequency                 1                         [facility]  
   
                                                    Start: 2022  
End: 2023                         History SDOH Social   
Connections Phone         5                         [facility]  
   
                                                    Start: 2022  
End: 2023                         History SDOH Social   
Connections Get Together  4                         [facility]  
   
                                                    Start: 2022  
End: 2023                         History SDOH Social   
Connections Protestant        3                         [facility]  
   
                          Start: 1940 Sex Assigned At Birth Not on file  C  
[facility]  
   
                                                    Start: 2022  
End: 2022                         Exposure to SARS-CoV-2   
(event)                   Not sure                  [facility]  
Work Phone:   
[phone]  
   
                                        Start: 2023   History SDOH Alcohol  
 Std   
Drinks                    0                         [facility]  
   
                                        Start: 2023   History SDOH Social   
Connections Get Together  2                         [facility]  
   
                                        Start: 2023   History SDOH Social   
Connections Membership    98                        [facility]  
   
                                                    Start: 2023  
End: 2023     History of Social function                     [facility]  
[facility]  
   
                                                    Start: 2023  
End: 2023                         Social connection and   
isolation panel                                     [facility]  
   
                                                            Do you belong to any  
 clubs   
or organizations such as   
Christian groups, [employer],   
[employer] or athletic   
groups, or school groups? Patient refused           [facility]  
   
                                                            Are you now ,  
   
, ,   
, never    
or living with a partner?                    [facility]  
   
                                                            How often to you hav  
e a   
drink containing alcohol? Never                     [facility]  
   
                                                            (I/We) worried wheth  
er   
(my/our) food would run   
out before (I/we) got   
money to buy more.        DK or Refused             [facility]  
   
                                                            In the past 12 month  
s, was   
there a time when you were   
not able to pay the   
mortgage or rent on time? No                        [facility]  
   
                                                            Do you belong to any  
 clubs   
or organizations such as   
Christian groups, [employer],   
[employer] or athletic   
groups, or school groups? Yes                       [facility]  
Work Phone:   
[phone]  
   
                                                            Are you now ,  
   
, ,   
, never    
or living with a partner?                    [facility]  
Work Phone:   
[phone]  
   
                                                            Do you feel stress -  
   
tense, restless, nervous,   
or anxious, or unable to   
sleep at night because   
your mind is troubled all   
the time - these days   
[OSQ]                     Not at all                [facility]  
Work Phone:   
[phone]  
  
  
  
Medical Equipment  
  
  
                                Procedure Code  Equipment Code  Equipment Origin  
al   
Text                      Equipment Identifier      Dates  
   
                                                                Felt Ptfe 1.2 Cm  
 X   
10 Cm - [id number]         [id number]                Start:   
10-  
  
  
  
                                          
   
                                          
   
                                          
  
  
  
Clinical Notes 2019 to 2024  
  Telephone Encounter - Estrellita Early RN - 2024 1:35 PM ESTTelephone   
Encounter - Júnior Ahuja MD - 2024 10:55 AM ESTTelephone Encounter
 - Shanna Spicer - 2024 9:46 AM EST  
  
                                Note Date & Type Note            Facility  
   
                                                    2024 Miscellaneous   
Notes                                   Formatting of this note might be   
different from the original.  
Attempted to call pt and daughter   
Dixie answered. Spoke with pt on   
the phone and he gives permission   
to share any information with   
Dixie. Added to the appt desk.   
Dixie notified that prescriptions   
okayed by Dr. Ahuja and that   
Dr. Ahuja would like to see pt   
for a yearly visit. Dixie states   
her dad still has a very hard time   
getting around and she isn't sure   
if she could bring him in or not.   
She is hoping that he will improve   
when she takes him to [place]. She   
will call back to set up the appt   
when she feels she is able to   
bring him in. Notified that   
prescriptions were only filled for   
3 months. Verbalizes   
understanding.  
Electronically signed by Estrellita Early RN at 2024 1:35 PM   
EST  
Formatting of this note is   
different from the original.  
Patient's request for medication   
is as follows  
Requested Prescriptions  
  
Signed Prescriptions Disp Refills  
omeprazole (PRILOSEC) 40 mg   
capsule 90 capsule 0  
Sig: Take 1 capsule by mouth once   
daily.  
Authorizing Provider: JÚNIOR AHUJA  
ferrous sulfate 325 mg (65 mg   
iron) EC tablet 180 tablet 0  
Sig: Take 1 tablet by mouth two   
times a day with meals.  
Authorizing Provider: JÚNIOR AHUJA  
  
Schedule annual wellness with   
follow up in office.  
  
Júnior Ahuja MD  
Electronically signed by   
Júnior Ahuja MD at   
2024 10:56 AM EST  
Formatting of this note is   
different from the original.  
Pt's daughter calling and states   
pt is moving to [place]. Pt is   
currently at [facility]. She   
wants to get his meds in order for   
[place] and needs refills on the   
following meds:  
  
Patient has been identified by   
name and date of birth:  
  
Requested Prescriptions  
  
Pending Prescriptions Disp Refills  
omeprazole (PRILOSEC) 40 mg   
capsule 90 capsule 3  
Sig: Take 1 capsule by mouth once   
daily.  
ferrous sulfate 325 mg (65 mg   
iron) EC tablet 180 tablet 1  
Sig: Take 1 tablet by mouth two   
times a day with meals.  
  
Date of last office visit in   
primary care: 2023  
Date of next office visit in   
primary care: Pt currently in   
nursing home.  
  
Please advise. Thank you.  
Estrellita Early RN.  
Electronically signed by Estrellita Early RN at 2024 10:35 AM   
EST  
documented in this encounter            [facility]  
   
                                                    2024 Miscellaneous   
Notes                                   Formatting of this note might be   
different from the original.  
Attempted to call pt and daughter   
Dixie answered. Spoke with pt on   
the phone and he gives permission   
to share any information with   
Dixie. Added to the appt desk.   
Dixie notified that prescriptions   
okayed by Dr. Ahuja and that   
Dr. Ahuja would like to see pt   
for a yearly visit. Dixie states   
her dad still has a very hard time   
getting around and she isn't sure   
if she could bring him in or not.   
She is hoping that he will improve   
when she takes him to [place]. She   
will call back to set up the appt   
when she feels she is able to   
bring him in. Notified that   
prescriptions were only filled for   
3 months. Verbalizes   
understanding. States at this   
time, she is good on his med list.  
Electronically signed by Estrellita Early RN at 2024 1:34 PM   
EST  
Formatting of this note might be   
different from the original.  
Daughter Dixie, called to clarify   
pt's medication list. Reports   
patient will be discharging from   
[facility] to assisted living and would   
like to make sure pcp medication   
list matches ours. Advised Dixie,   
pt needs to give verbal permission   
to speak with her regarding his   
medical information. Dixie   
expressed her concern about this   
since she is HCPOC, and does   
report pt is alert and and able to   
make decisions. This nurse left   
message for pt to return call to   
triage nurse for question from   
nurse. Please ask pt for verbal   
permission to speak with daughter,   
Dixie, about his medical   
information. Once pt gives   
permission please call daughter   
Dixie to clarify pt's medication   
list.  
Electronically signed by KELSEA Rahman RN at 2024 9:51 AM   
EST  
documented in this encounter            [facility]  
   
                                                    2024 Miscellaneous   
Notes                                   Formatting of this note might be   
different from the original.  
Called [facility] and was told patient   
never had a fill on xifaxan do to   
denial from insurance company.  
Called Dixie back and discussed   
with her. She stated she remembers   
the denial and Dr Kaufman had   
said he didn't need to take at   
that time. They have a follow up   
2/15 and will discuss then.  
  
Magdalene Torres RN  
2024 11:27 AM  
  
Electronically signed by   
Magdalene Torres RN at   
2024 11:27 AM EST  
Formatting of this note might be   
different from the original.  
Patient's daughter called to ask   
if this patient should still be   
taking xifaxan? They have not had   
a refill in a while and may need a   
PA if he is supposed to still be   
on this medication.  
Electronically signed by Shanna Spicer at 2024 9:48 AM EST  
documented in this encounter            [facility]  
   
                                2023 Note                 [facility]  
   
                                                    2023 History of   
Present illness Narrative               Formatting of this note might be   
different from the original.  
This is an Express Care eVisit   
note for Mitesh Braun  
  
eVisit/Questionnaire reviewed  
The chief complaint for the visit   
- Patient presents with:  
Wound Infection  
  
Recommendations/Treatment plan -   
See My Chart Message to patient  
  
Time spent <1 min  
  
ALEXIA Sewell.CNP  
  
Electronically signed by   
Krysta Vidal APRN.CNP at   
2023 11:17 AM EST  
documented in this encounter            [facility]  
   
                                                    2023 Miscellaneous   
Notes                                   Formatting of this note might be   
different from the original.  
Sontosin- caregiver from [facility]- reports patient has an   
open wound on right leg b/t knee   
and ankle, front of leg, for about   
a week. Reports it's a scrape   
about the size of a half dollar.   
Reports it has red borders 3-4     
around with pus. Reports the other   
caregivers have been putting salve   
on it with dressing. Reports there   
is no scab forming and it looks   
worse. Sontosin asking for a VV appt.   
Reports patient does not know   
ID/password to his [facility]. Francis   
declines offer for appt with pcp   
office or EC, states she is unable   
to bring patient. Informed Francis   
of the fransico [facility]   
express care online. Francis states   
she will try this fransico.  
Electronically signed by KELSEA Rahman RN at 2023 10:02 AM   
EST  
documented in this encounter            [facility]  
   
                                                    2023 Miscellaneous   
Notes                                   Formatting of this note might be   
different from the original.  
Attempted to reach the patient at   
the contact number that they   
provided [phone] (home) .   
Unable to speak with patient so   
without identifying the patient   
the following information was left   
on their voice mail: Date of   
procedure, location and report   
time  
Prep instructions  
A message was left informing the   
patient/patient representative   
they must have a responsible adult   
accompany them to their procedure;   
and remain in the endoscopy area   
until they are discharged. Failure   
to have a responsible adult   
accompany the patient to their   
procedure appointment prevents the   
use of sedation or anesthesia for   
their procedure; and can result in   
cancellation of the procedure  
NPO instructions were reviewed.  
Instructions to contact their   
primary care provider regarding   
their medications and which   
medications to stop in preparation   
for their procedure  
Instructions to completely read   
and follow the written   
instructions that they recieved   
regarding their procedure.  
Number to call with questions or   
concerns [phone]  
Number to call to cancel their   
procedure [phone]  
Neelima Briones RN  
  
Electronically signed by Neelima Briones RN at 2023 4:36 PM   
EST  
documented in this encounter            [facility]  
   
                                10- Note                 [facility]  
   
                                                    10- History of   
Present illness Narrative               Formatting of this note is   
different from the original.  
HCC Clinic New Patient  
Date of visit: 10/19/23  
  
CC: HCC treated with SBRT  
  
HPI: This is a 83 year old with   
medical history of MASH-related   
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   
complicated by HCC (within [place]   
treated with SBRT 2023), HE, PUD   
(bled ) here for follow-up  
  
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   
lesion, treated with SBRT   
(completed 2023)  
Most recent MRI 10/2 without new   
lesions and ecreased size of   
treated segment Keren lesion with   
enhancing internal septa (LR TR   
equivocal).  
  
He is doing well otherwise, no   
liver-related symptoms to report  
Has been having melenic stool for   
the last few weeks, also with some   
hematochezia. Has been requiring   
iron transfusions lately. Is more   
fatigued lately since this   
started. Patient does not want to   
pursue colonoscopy- had one in   
2023 with 1.4 cm TVA removed,   
EGD with small non-bleeding AVM   
which was cauterized  
  
Last clinic visit:  
Long-standing history of NAFLD  
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   
CÁRDENAS-related cirrhosis (given his   
risk factors) in 2022 on   
further workup of anemia. Saw   
hematology/oncology for this,   
eventually had liver imaging which   
noted a mass in 2022, recent   
MRI in April with cirrhosis along   
with a 4.1 cm HCC in seg 4A  
Case discussed at tumor board and   
he was referred for SBRT (limited   
options given age, functional   
limitations, etc)  
He met with radiation oncology   
just recently and is eager to get   
treatment started  
  
With respect to his liver-related   
symptoms:  
Sleeps a lot in the daytime, not   
so much at night. Possibly a bit   
more confusion when he talks.  
Has at least 4-5 soft bowel   
movements/day,  black as coal    
because he is taking iron   
supplements.  
No abdominal distention. Takes   
lasix for LE edema  
No jaundice, pruritis  
  
HCC history:  
Diagnosis date: 2022  
Initial AFP: 8.5  
Biopsy-proven?: no  
Imaging: MRI  
Within [place]: Yes  
CPT score: A  
ECOG performance status: 1  
  
Treatment history:  
LT candidate: no  
Resection: no  
Locoregional therapy: getting SBRT  
  
Review of Systems:  
See note  
  
Past Medical/surgical/family   
History: unchanged  
  
Social History:  
Tobacco: denies  
EtOH: denies  
Substances: denies  
  
I have confirmed and edited as   
necessary, the PFSH and ROS   
obtained by others.  
  
Current Outpatient Medications on   
File Prior to Visit  
Medication Sig  
oxybutynin ER (DITROPAN XL) 15 mg   
24 hr Extended Rel Tab Take 2   
tablets by mouth once daily.  
furosemide (LASIX) 20 mg tablet   
TAKE 1 TABLET BY MOUTH EVERY OTHER   
RDAY  
Diaper,Brief, Adult,Disposable   
(DEPEND EASY FIT UNDERGARMENTS) 1   
Each three times daily.  
ascorbic acid, vitamin C, (VITAMIN   
C) 500 mg tablet Take 1 tablet by   
mouth once daily.  
ascorbic acid-elderberry fruit   
100-50 mg chew Take 2 tablets by   
mouth once daily.  
cyanocobalamin (VITAMIN B-12)   
2,500 mcg tablet Take 2,500 mcg by   
mouth once daily.  
aspirin, enteric coated (ASPIRIN,   
ENTERIC COATED) 81 mg EC tablet   
Take 81 mg by mouth once daily.  
albuterol HFA (PROVENTIL HFA,   
VENTOLIN HFA) 90 mcg/actuation   
inhaler Inhale 2 Puffs as   
instructed every 4 hours as needed   
for wheezing/shortness of breath.  
omeprazole (PRILOSEC) 40 mg   
capsule Take 1 capsule by mouth   
once daily.  
allopurinol (ZYLOPRIM) 300 mg   
tablet Take 1 tablet by mouth once   
daily.  
magnesium oxide 400 mg magnesium   
cap Take 1 capsule by mouth once   
daily.  
fenofibrate nanocrystallized   
(TRICOR) 48 mg tablet Take 1   
tablet by mouth once daily.  
sertraline (ZOLOFT) 50 mg tablet   
Take 1 tablet by mouth once daily.  
nitroglycerin sublingual   
(NITROQUICK) 0.4 mg SL tablet   
Dissolve 1 tablet under the tongue   
every 5 minutes as needed for   
chest pain. FOR CHEST PAIN. IF NO   
RELIEF CALL 911  
fluticasone (FLONASE) 50   
mcg/actuation nasal spray Use 1   
Spray in each nostril once daily.  
ferrous sulfate 325 mg (65 mg   
iron) EC tablet Take 1 tablet by   
mouth twice daily with meals.   
(Patient taking differently: Take   
325 mg by mouth once daily.)  
fluticasone-vilanterol (BREO   
ELLIPTA) 200-25 mcg/dose inhaler   
Inhale 1 Inhalation as instructed   
once daily. Inhale one puff once   
daily. DO NOT CLICK OPEN UNTIL   
READY FOR DOSE  
multivitamin tablet Take 1 tablet   
by mouth once daily.  
torsemide (DEMADEX) 20 mg tablet   
Take 2 tablets by mouth once daily   
for 5 days.  
melatonin 10 mg tab Take 1 tablet   
by mouth as needed.  
amoxicillin (POLYMOX, AMOXIL) 500   
mg capsule Take four tablets, one   
hour prior to dental work (Patient   
not taking: Reported on 2023)  
  
No current facility-administered   
medications on file prior to   
visit.  
  
Vitals:  
/89[Pt denies headaches   
nausea and dizziness[   Pulse   
129[Afib[   Temp (Src) 97.8   
(Temporal)   Ht 5' 10  (1.78m)     
Wt 246 lb 9.6 oz (111.9kg)   SpO2   
98%   BMI 35.38 kg/(m^2).  
  
  
Physical Exam:  
Well appearing, in NAD  
No jaundice  
Aox3  
In a wheelchair  
  
Laboratory:  
MELD 3.0: 7 at 2023 10:07 AM  
MELD-Na: 8 at 2023 10:07 AM  
Calculated from:  
Serum Creatinine: 1.05 mg/dL at   
2023 10:07 AM  
Serum Sodium: 139 mmol/L (Using   
max of 137 mmol/L) at 2023   
10:07 AM  
Total Bilirubin: 0.5 mg/dL (Using   
min of 1 mg/dL) at 2023 10:07   
AM  
Serum Albumin: 3.5 g/dL at   
2023 10:07 AM  
INR(ratio): 1.1 at 2023 10:07   
AM  
Age at listing (hypothetical): 83   
years  
Sex: Male at 2023 10:07 AM  
  
Disclaimer: The MELD Calculator   
cannot calculate MELD scores for   
patients on dialysis.  
  
AFP  
Lab Results  
Component Value Date  
AFP <3.0 10/02/2023  
  
Imaging:  
MRI Liver 2022:  
Lesion#: 1  
Location: Segment Keren; (series 15   
image 19)  
Size (maximum long-axis   
dimension): 4.1 cm, Prior size:   
N/A  
Arterial Phase Hyperenhancement   
(APHE): Nonrim APHE  
Wash-out: Yes - non-peripheral  
Enhancing Capsule: Yes  
Threshold growth (50% or more   
growth in 6 months or less): N/A  
Ancillary features favoring   
malignancy or HCC: Fat in mass   
more than  
adjacent liver  
Ancillary features favoring   
benignity: N/A  
LI-RADS Category: LR-5 (definitely   
HCC)  
OPTN Class 5 Category:5B  
  
MRI Liver 10/2023  
  
Decreased size of treated segment   
Keren lesion with enhancing internal  
septa (LR TR equivocal).  
  
No new hepatic lesion that meets   
LR 5/OPTN criteria for   
hepatocellular  
carcinoma.  
  
Cirrhotic liver morphology with   
findings of portal hypertension,  
unchanged.  
  
Endoscopy:  
EGD 2023:  
Findings:  
A 5 cm hiatal hernia was present.  
A single 6 mm angiodysplastic   
lesion with no bleeding was found   
on  
the lesser curvature of the   
stomach. Fulguration to ablate the   
lesion  
by argon plasma at 1.3   
liters/minute and 35 flores was   
successful.  
Diffuse moderate inflammation   
characterized by erythema was   
found in  
the gastric body and in the   
gastric antrum. Biopsies were   
taken with  
a cold forceps for histology.  
Multiple 7 mm sessile polyps with   
no bleeding and no stigmata of  
recent bleeding were found in the   
stomach. This was biopsied with a  
cold forceps for histology.  
The first portion of the duodenum   
and second portion of the duodenum  
were normal.  
  
Assessment and plan:  
This is a 83 year old with medical   
history of MASH-related cirrhosis   
(MELD 3.0: 7) complicated by HCC   
(within [place] treated with SBRT   
2023), HE, PUD (bled ) here   
for follow-up  
Doing well from an HCC and   
liver-related standpoint without   
evidence of decompensation  
Has been having melenic stool   
lately. Discussed need for repeat   
EGD and colonoscopy but patient   
wants to defer repeat colonoscopy   
indefinitely  
Will send for EGD  
Repeat MRI 3 months  
  
RTC 3-4 months  
  
I spent a total of 45 minutes on   
the date of the service which   
included preparing to see the   
patient, face-to-face patient   
care, completing clinical   
documentation, obtaining and/or   
reviewing separately obtained   
history, performing a medically   
appropriate examination,   
counseling and educating the   
patient/family/caregiver, ordering   
medications, tests, or procedures,   
and communicating with other HCPs   
(not separately reported).  
  
Juanito Kaufman MD  
Associate Staff, Department of   
Gastroenterology and Hepatology  
Digestive Disease and Surgery   
[place]  
Electronically signed by Juanito Kaufman MD at 10/19/2023 3:43   
PM EDT  
documented in this encounter            [facility]  
   
                                10- Note                 [facility]  
   
                                10- Note                 [facility]  
   
                                                    10- History of   
Present illness Narrative               Formatting of this note is   
different from the original.  
Radiology Service Progress Note  
  
DATE OF SERVICE: 2023  
TIME: 10:47 AM  
  
PATIENT IDENTITY VERIFICATION   
COMPLETED USING TWO (2) STANDARD   
IDENTIFIERS: Name and Date of   
Birth confirmed by patient   
verbally.  
FALL SCREENING: Has the patient   
had 2 falls in the last year or 1   
fall with injury or currently   
using an Ambulatory Assistive   
Device (Walker, Cane, Wheelchair,   
Crutches, etc.)? Yes, Patient High   
Risk for Falls  
What interventions were put in   
place to prevent falls during this   
visit? Instructed Patient to Call   
for Help if Needed, Offered   
Assistance with   
Transfers/Clothing, Instructed   
Patient to Remain Seated (Not on   
Exam Table) Until Exam, and   
Increased Observations by   
Caregivers  
PATIENT GENDER DATA: Male  
PATIENT RELEVANT IMPLANT DATA   
REVIEWED: Yes  
ALLERGIES: Reviewed and unchanged  
CONTRAST ALLERGY: NO.  
  
EXAM: MRI - CONTRAST TYPE: GROUP   
II  
  
PERIPHERAL IV DATA: Ambulatory: A   
peripheral IV was started in the   
Right antecubital site with a   
Angio cath: 22 gauge.  
RADIOLOGY DEPARTMENT: MR; Exam(s)   
Completed: Body: Liver (routine)  
  
SIGNATURE: RT Larisa(R)   
PATIENT NAME: Mitesh Braun  
DATE: 2023 MRN:   
[id number]  
TIME: 10:47 AM  
  
Electronically signed by Margaret Reid RT(R) at 10/02/2023 10:47 AM   
EDT  
documented in this encounter            [facility]  
   
                                                    2023 Miscellaneous   
Notes                                   Formatting of this note might be   
different from the original.  
Referral form has been faxed to   
[facility] at number given below. Call to   
Dixie on [employer] and left vague   
message that referral form has   
been faxed to [facility]. Also called   
work number listed and left   
message as well notifying her with   
vague message.  
  
Carlee Monge Ma  
  
Electronically signed by Carlee Monge Ma at 2023 9:38 AM   
EDT  
Formatting of this note might be   
different from the original.  
Please place referral, fax to   
[phone]. [facility] called and they   
have not received anything and pt   
is calling them. Also please call   
pt once referral has been faxed.   
Pt concerned because he needs to   
be seen asap.  
  
Chantel Aldana LPN  
  
Electronically signed by Chantel Aldana LPN at 2023 9:23   
AM EDT  
Formatting of this note might be   
different from the original.  
Please fax referral  
ALEXIA Roy.CNP  
Electronically signed by Krista Perez APRN.CNP at 2023 9:12   
AM EDT  
Formatting of this note might be   
different from the original.  
Patient calls to check on status   
of request below. Patient reports   
wound is on left calf. Open area   
is the size of a quarter with   
yellowish green drainage. Redness   
around the wound is also about the   
width of another quarter. Patient   
not sure if wound is warm to   
touch. Denies fever.  
Declines appointment for eval with   
[facility].  
Patient wants to go straight to   
wound center.  
  
Melva Khalil RN  
  
Electronically signed by   
Melva Khalil RN at   
2023 10:09 AM EDT  
Formatting of this note might be   
different from the original.  
Pt's daughter Dixie calling back   
to report wound is on pt's calf,   
she is not sure which calf, states   
it is about nickel to quarter   
size. Started Tues - 2 days ago.   
Has yellow drainage with redness   
around the opening. Denies fever.  
  
Please advise if referral will be   
made to the wound center.  
  
María Chance LPN  
  
Electronically signed by María Chance LPN at 2023 8:50 AM EDT  
Formatting of this note might be   
different from the original.  
Left message for Dixie  Nurse   
to call with more info.  
Helen Zollinger LPN  
  
Electronically signed by   
Zollinger, Helen E LPN at   
2023 4:19 PM EDT  
Formatting of this note might be   
different from the original.  
Please call Dixie from [facility] for more information on   
this wound: location, etc.  
ALEXIA Roy.CNP  
Electronically signed by Krista Perez APRN.CNP at 2023 2:16   
PM EDT  
Formatting of this note might be   
different from the original.  
Cece with [facility]   
calls to report that Dixie with   
[facility] called for appt   
for pt to be seen for yellow   
drainage from wound. Cece reports   
that pcp has to send order.  
  
Fax order to [facility]:   
[phone].  
  
Yi Last LPN  
  
Electronically signed by   
Yi Last LPN at   
2023 1:56 PM EDT  
documented in this encounter            [facility]  
   
                                                    2023 Miscellaneous   
Notes                                   Formatting of this note might be   
different from the original.  
Delfino spoke with ifeanyi Colin.   
Discussed patient currently   
receives 30 hour a week [facility] care   
[facility]. Patient also   
receives medical alert button   
court[facility] of the [facility].  
Dixie notes that she is concerned   
regarding patient falls. Patient   
is adamant that he is not   
interested in going in to an AL.   
Dixie and Delfino discussed that the [facility]   
nurse that visits patient noted   
patient was not to the point of   
needing a guardian.  
Dixie and Delfino also discussed   
caregiver support groups to help   
support her with taking care of   
patient.  
Dixie and Delfino also discussed [facility]   
[facility] being an agency if patient   
is ever open to going to AL   
looking at options.  
Discussed also that patient can   
make choice to stay at home as he   
so chooses. Dixie notes that she   
understands that, but it is   
difficult with being concerned   
about number of falls.  
Delfino also noted that there is APS   
through [facility] for older adults if   
and when they should proceed to   
inability to care for self and   
have cognitive decline issues. Delfino   
is aware that patient is not to   
that point. Just wanting to   
educate daughter on services   
available in the community.  
Dixie notes that she will reach   
out to [facility] and see   
if they have any other service and   
supports that they could provide   
patient. Patient already receives   
[facility].   
This would just be for any extra   
service ideas such as support   
daughter as caregiver.  
Dixie thanked Delfino for community   
social service information.  
Electronically signed by   
Janine Yanez MSW at 2023   
1:08 PM EDT  
Formatting of this note might be   
different from the original.  
Delfino received message back from   
patient daughter requesting call   
back. Delfino called daughter back and   
left message that she will reach   
out and call ifeanyi Colin today   
@1pm.  
Electronically signed by   
Janine Yanez MSW at 2023   
9:33 AM EDT  
Formatting of this note might be   
different from the original.  
Delfino left message for daughter Dixie   
to return [place] call to discuss   
community resources for patient   
care needs.  
Electronically signed by   
Janine Yanez MSW at 2023   
9:28 AM EDT  
Formatting of this note might be   
different from the original.  
Refer to [facility] for options for more   
home care or support.  
Electronically signed by   
Júnior Ahuja MD at   
2023 11:18 PM EDT  
Formatting of this note might be   
different from the original.  
Pt's daughter calls to report that   
pt is falling more and forgetful   
more. Daughter reports pt fell   
three times in eight days and   
needed help to get up. Daughter   
reports pt does not want daughter   
to come to [facility] anymore because   
 it's none of her business .   
Daughter is concerned pt is not   
being truthful to dr. Daughter has   
tried to discuss Assisted Living   
with pt but pt is adamant that he   
is not leaving his house. Daughter   
reports she does have a caretaker   
come to pt's home for five hours   
six days a week but it is getting   
to the point where someone needs   
to be there more.  
Daughter is asking if pcp has any   
recommendations on what to do.  
Daughter is aware that pcp is out   
of the office this week.  
  
Yi Last LPN  
  
Electronically signed by   
Yi Last LPN at   
2023 1:27 PM EDT  
documented in this encounter            [facility]  
   
                                                    2023 Miscellaneous   
Notes                                   Formatting of this note might be   
different from the original.  
Detailed VM left on pt's daughter   
identified voicemail of   
information below.  
  
Chantel Aldana LPN  
  
  
Electronically signed by Chantel Aldana LPN at 2023 8:49   
AM EDT  
Formatting of this note is   
different from the original.  
Patient's request for medication   
is as follows  
Requested Prescriptions  
  
Signed Prescriptions Disp Refills  
dilTIAZem CD (CARDIZEM CD, CARTIA   
XT) 120 mg 24 hr capsule 90   
capsule 1  
Sig: Take 1 capsule by mouth once   
daily.  
Authorizing Provider: JÚNIOR AHUJA  
metoprolol tartrate, short acting,   
(LOPRESSOR) 25 mg tablet 180   
tablet 1  
Sig: Take 1 tablet by mouth twice   
daily.  
Authorizing Provider: JÚNIOR AHUJA  
  
Order entered - please phone   
pharmacy and notify patient.  
  
Júnior Ahuja MD  
Electronically signed by   
Júnior Ahuja MD at   
2023 10:18 AM EDT  
Formatting of this note is   
different from the original.  
Patient has been identified by   
name and date of birth: Yes,   
Provider Date Time  
Daughter phones for refill(s):  
Requested Prescriptions  
  
Pending Prescriptions Disp Refills  
dilTIAZem CD (CARDIZEM CD, CARTIA   
XT) 120 mg 24 hr capsule  
Sig: Take 1 capsule by mouth once   
daily.  
metoprolol tartrate, short acting,   
(LOPRESSOR) 25 mg tablet  
Sig: Take 1 tablet by mouth twice   
daily.  
  
Patient's daughter calling for   
refills. Says patient was in ER   
yesterday for his heart rate. She   
states he stopped taking   
medication due to no refills.  
She does not know if these were   
prescribed by PCP or [facility].  
  
Date of last office visit with   
pcp: 23  
Date of last office visit in   
primary care:  
Last 2 Encounter Wt Readings:  
Date: Wt:  
2023 103.4 kg (228 lb)  
2023 103.9 kg (229 lb)  
Previous labs/tests for   
medication: Blood Pressure:  
BUN (mg/dL)  
Date Value  
2023 27  
2022 28  
  
Sodium (mmol/L)  
Date Value  
2023 139  
2022 139  
Last 1 Encounter BP Readings:  
Date: BP:  
2023 102/60  
Please advise. Thank you. Lolis Cervantes RN 22  
Electronically signed by Lolis Cervantes RN at 2023 3:13 PM   
EDT  
documented in this encounter            [facility]  
   
                                2023 Note                 [facility]  
   
                                                    2023 History of   
Present illness Narrative               Formatting of this note is   
different from the original.  
Hematologic problem(s):  
1) IgG kappa monoclonal gammopathy  
2) BREEZY.  
3) Liver mass.  
4) Hypercalcemia.  
5) Splenomegaly.  
6) Cirrhosis.  
  
HPI: The patient is an 81 yo male   
with a PMH significant for HTN,   
polycystic kidney disease (seeing   
nephrology for ~15 years), HLD,   
aortic repair (bovine valve), CAD,   
CAREN (CPAP), asthma, hypercalcemia,   
splenomegaly (noted on CT chest   
2014; spleen up to 18.4 cm in AP   
dimension; not enlarged on CT   
enterography ) and obesity.  
  
He had lab work ordered at his   
nephrologist office. Protein   
electrophoresis of the urine   
demonstrated no evidence of   
monoclonal spike. Urine protein   
creatinine ratio was elevated.   
Chemistry panel showed a   
creatinine of 1.33 mg/dL. Calcium   
mildly elevated 10.6 mg/dL.   
Vitamin D39.7 PTH 93.4 CBC   
significant for a total white   
count of 4900. No differential   
performed. Hemoglobin 9.0 g/dL.   
. Platelet count 79,000.   
Protein electrophoresis of the   
serum revealed a M spike but no   
immunofixation was performed.  
  
Chronic sensory neuropathy of the   
feet and left hand x10 years.   
Worse over time. He's never been   
given an answer as to why.  
  
Balance is off and he uses wheeled   
walker.  
  
L[place] alone [place]. Has   
home health aides for cleaning and   
washing clothes [facility].   
Daughter lives close by and he   
gets meals on wheels.  
  
Was having left posterior hip pain   
and got relief with Absorsene Jr.  
  
----------------------------------  
-  
  
He received 10 doses of iron   
sucrose.  
  
Presents for ongoing hematologic   
management.  
  
Interim history:  
He offers no complaints today.  
  
Diagnosed radiographic HCC per MRI   
obtained 2023 showing a 4.1   
cm tumor in segment 4A. Systemic   
staging, labs for classification   
(child Coffey, MELD) pending.   
Tentatively intermediate stage per   
BCLC, cT2 N0 MX stage II per AJCC.  
  
COURSE: definitive and SBRT   
(stereotactic body radiotherapy)  
AREA TREATED: Segment 4a liver   
HCC.  
Current dose: 3000 cGy in 1 fx  
Planned dose: 3000 cGy in 1 fx  
  
Had colonoscopy and EGD in 2023. A tubulovillous adenoma was   
removed from the sigmoid colon. On   
EGD biopsies of the stomach mucosa   
demonstrated gastric antral type   
mucosa with mild chronic inactive   
gastritis and reactive   
gastropathy. Fragments of gastric   
antral type mucosa with mild   
chronic inactive gastritis and   
reactive gastropathy. IHC for H.   
pylori was negative.  
  
Was found to be in atrial   
fibrillation when home nurse from   
[facility] picked up irregular heart beat   
on pulse ox check. No symptoms.   
Was taken to [facility] ED. he was   
advised to increase low-dose   
aspirin to 3 times daily--but I   
cannot find documentation of this   
after careful review of the   
[facility]   
electronic medical record. He is   
not on anticoagulation because of   
thrombocytopenia and cirrhosis.  
  
Back in ED yesterday at the   
request of the [facility] due to   
variable heart rate. Was told in   
ED  in and out  of a fib.  
  
Sensory neuropathy symptoms   
subjectively stable.  
Previously seen by neurology group   
in [place].  
Was told nothing to be done for   
neuropathy.  
  
He has not had any black stools.   
He says occasionally he has some   
bleeding from hemorrhoids.  
  
PHYSICAL EXAM:  
Vitals: Blood pressure 108/63,   
pulse 70, temperature 36.6 C (97.9   
F), height 171 cm (5' 7.32 ),   
weight 109.8 kg (242 lb), SpO2 100   
%.  
Fatigued-appearing and in no acute   
distress.  
EYES: Sclerae are anicteric   
bilaterally.  
LYMPHATIC: There is no palpable   
cervical or supraclavicular   
adenopathy.  
RESPIRATORY: Inspiratory breath   
sounds are of normal intensity in   
all fields.  
CARDIOVASCULAR: Rhythm is regular   
today.  
ABDOMEN: The abdomen is obese but   
nondistended.  
Extremities: Bilateral lower   
extremity swelling. Right somewhat   
worse than left. Overlying   
pitting. Stable.  
SKIN: No jaundice.  
  
LABS:  
Component Latest Ref Rng & Units   
2022  
WBC 3.70 - 11.00 k/uL 5.22 4.65   
3.87  
RBC 4.20 - 6.00 m/uL 3.27 (L) 3.73   
(L) 3.63 (L)  
Hemoglobin 13.0 - 17.0 g/dL 10.9   
(L) 11.1 (L) 11.8 (L)  
Hematocrit 39.0 - 51.0 % 34.6 (L)   
36.0 (L) 36.2 (L)  
MCV 80.0 - 100.0 fL 105.8 (H) 96.5   
99.7  
MCH 26.0 - 34.0 pg 33.3 29.8 32.5  
MCHC 30.5 - 36.0 g/dL 31.5 30.8   
32.6  
RDW-CV 11.5 - 15.0 % 15.0 16.9 (H)   
20.0 (H)  
Platelet Count 150 - 400 k/uL 69   
(L) 98 (L) 66 (L)  
MPV 9.0 - 12.7 fL 12.2 11.4 9.8  
Neut% % 78.9 79.1  
Abs Neut (ANC) 1.45 - 7.50 k/uL   
3.67 3.06  
Lymph% % 10.8 11.1  
Abs Lymph 1.00 - 4.00 k/uL 0.50   
(L) 0.43 (L)  
Mono% % 5.6 6.2  
Abs Mono <0.87 k/uL 0.26 0.24  
Eosin% % 3.4 2.8  
Abs Eosin <0.46 k/uL 0.16 0.11  
Baso% % 0.9 0.5  
Abs Baso <0.11 k/uL 0.04 <0.03  
Immature Gran % % 0.4 0.3  
IMMATURE GRANS (ABS) <0.10 k/uL   
<0.03 <0.03  
NRBC /100 WBC 0.0 0.0  
Absolute nRBC <0.01 k/uL <0.01   
<0.01 <0.01  
DTYPE Auto Auto  
Iron 41 - 186 ug/dL 30 (L) 46  
TIBC 232 - 386 ug/dL 370 360  
Transferrin Saturation 15.0 - 57.0   
% 8.1 (L) 12.8 (L)  
Ferritin 30.3 - 565.7 ng/mL 24.8   
(L) 50.4  
  
Component Latest Ref Rng & Units   
2021  
Protein, Total 6.3 - 8.0 g/dL 6.4   
5.8 (L) 6.6  
Albumin 3.9 - 4.9 g/dL 3.8 (L) 3.5   
(L) 3.8 (L)  
Calcium 8.5 - 10.2 mg/dL 10.6 (H)   
10.8 (H) 10.8 (H)  
Bilirubin, Total 0.2 - 1.3 mg/dL   
0.5 0.4 0.5  
Alkaline Phosphatase 38 - 113 U/L   
62 85 111  
AST 14 - 40 U/L 31 30 35  
Glucose 74 - 99 mg/dL 103 (H) 108   
(H) 98  
BUN 9 - 24 mg/dL 26 (H) 33 (H) 27   
(H)  
Creatinine 0.73 - 1.22 mg/dL 1.16   
1.31 (H) 1.01  
Sodium 136 - 144 mmol/L 141 140   
135 (L)  
Potassium 3.7 - 5.1 mmol/L 4.8 5.5   
(H) 4.0  
Chloride 97 - 105 mmol/L 109 (H)   
108 (H) 103  
CO2 22 - 30 mmol/L 23 25 25  
Anion Gap 9 - 18 mmol/L 9 7 (L) 7   
(L)  
ALT 10 - 54 U/L 19 21 19  
eGFR-African American >60  
eGFR-All Other Races . >60  
eGFR >=60 mL/min/1.73m 54 (L) 74  
  
Component Latest Ref Rng & Units   
2022  
Cold Agglut, 4 degrees C <1:32   
Dilutions <1:32  
Cold Agglut, 37 degrees C   
Dilutions Test not indicated when   
titer is <1:32 at 4 degrees C.  
  
Component Latest Ref Rng & Units   
2022  
PT Sec <13.1 sec 10.9  
PT INR 0.9 - 1.3 1.1  
Pathologist Interpretation, CBCDIF   
Normocytic anemia with slight   
polychromasia . . .  
Pathologist (PASHA) Reviewed by   
Savanah Cole M.D., Ph.D  
APTT 23.0 - 32.4 sec 26.2  
Fibrinogen Ag 149 - 353 mg/dL 353  
  
Component Latest Ref Rng & Units   
2022  
IgG 700 - 1,600 mg/dL 1,120  
IgA 70 - 400 mg/dL 346  
IgM 40 - 230 mg/dL 79  
  
Component Latest Ref Rng & Units   
2022  
Albumin 3.43 - 5.41 g/dL 3.62  
Alpha 1 Globulin 0.18 - 0.43 g/dL   
0.32  
Alpha 2 Globulin 0.42 - 0.98 g/dL   
0.66  
Beta Globulin 0.61 - 1.17 g/dL   
0.87  
Gamma Globulin 0.53 - 1.51 g/dL   
1.12  
Interpretation (Prot Electro) No   
definitive M protein is identified   
on protein electrophoresis. An M   
protein is identified on protein   
electrophoresis. (A)  
Interpretation Comment for Protein   
Electrophoresis See separate   
immunofixation report for   
characterization of monoclonal   
gammopathy. . . .  
M-Protein Location Gamma Fraction   
1  
M-Protein Concentration <=0.00   
g/dL 0.30 (H)  
SPE Staff Review Reviewed by Sanaz Parrish MD  
  
Component Latest Ref Rng & Units   
2022  
Kappa Free, Serum 3.3 - 19.4 mg/L   
65.0 (H)  
Lambda Free, Serum 5.7 - 26.3 mg/L   
37.1 (H)  
K/L Ratio, Serum 0.26 - 1.65 1.75   
(H)  
  
IgG kappa on immunofixation of   
serum.  
  
Component Latest Ref Rng & Units   
2023  
PTH, Intact 15 - 65 pg/mL 69 (H)  
  
PATHOLOGY:  
Bone marrow biopsy done at [facility] 2023:  
Hypercellular bone marrow with   
trilineage hematopoiesis.  
No evidence of lymphoproliferative   
disorder or plasma cell dyscrasia.  
  
Flow cytometry did not reveal   
evidence of lymphoproliferative   
disorder. There was no monoclonal   
plasma cell neoplasm.  
  
Plasma cells reported at 2% on   
aspirate.  
Iron was reported as rare   
stainable iron.  
  
Fluorescence in situ hybridization   
for BCR ABL was negative.  
  
46 XY [20].  
  
IMAGING:  
US 2022:  
IMPRESSION:  
1. There is a new hyperechoic   
nodule in the right lobe of the   
liver  
which could be further evaluated   
by MRI  
2. Nodular contour of the liver   
which may be due to cirrhosis  
3. Splenomegaly  
  
Bone survey 2022:  
IMPRESSION:  
SEVERAL SUBCENTIMETER LUCENT AREAS   
IN THE CALVARIUM.  
  
DIFFUSE OSTEOPENIA.  
  
DEGENERATIVE CHANGES AS DESCRIBED.   
POSTOPERATIVE CHANGES AS   
DESCRIBED.  
  
NO ADDITIONAL FOCAL LYTIC OR   
BLASTIC ABNORMALITY IS   
APPRECIATED.  
  
ASSESSMENT/PLAN:  
(D47.2) MGUS (monoclonal   
gammopathy of unknown   
significance) (primary encounter   
diagnosis)  
(D47.2) IgG monoclonal gammopathy  
Assessment:  
-The patient is an 83-year-old   
male with a past medical history   
as outlined above. He has no   
history of diabetes but has   
longstanding worsening sensory   
neuropathy of the feet and left   
hand. He has polycystic kidney   
disease as well as chronic mild   
hypercalcemia with elevation of   
PTH and stable serum creatinine   
over time. Referred for possible   
serum monoclonal antibody on   
electrophoresis of the serum.   
Urine negative for monoclonal   
protein on electrophoresis.  
-Low-level IgG kappa monoclonal   
gammopathy.  
-Previous hypercalcemia secondary   
to hyperparathyroidism.  
-Bone survey revealed no lytic   
lesions.  
-Previously reviewed the results   
of the bone marrow biopsy in   
detail with the patient and his   
daughter. No evidence of   
smoldering myeloma or multiple   
myeloma.  
Plan:  
-Reassess in 6 months.  
  
(D50.0) Iron deficiency anemia due   
to chronic blood loss  
(D69.6) Thrombocytopenia (HCC)  
Assessment:  
-Responded very well to a course   
of parenteral iron.  
-No overt signs of GI bleeding.  
-He was not put on anticoagulation   
for atrial fibrillation due to   
thrombocytopenia.  
-Iron saturation low again.   
Ferritin decreasing.  
Plan:  
-Check B12, serum folate and TSH.  
-5 doses of iron sucrose.  
-Reassess 4 to 6 weeks after   
completing iron.  
  
(E21.3) Hyperparathyroidism (HCC)  
Assessment:  
-Serum PTH level elevated.  
Plan:  
-Defer management to PCP.  
  
(K74.69) Other cirrhosis of liver   
(HCC)  
Assessment:  
-Established with hepatology.  
-Diagnosed with HCC--received SBRT  
Plan:  
-Follow up with hepatology.  
  
Portions of this documentation   
were copied and pasted from   
previous office visit notes in   
order to provide a cohesive   
continuity of the history. The   
note has been reviewed and edited   
and updated as necessary.  
  
I spent a total of 30 minutes on   
the date of the service which   
included preparing to see the   
patient, face-to-face patient   
care, completing clinical   
documentation, obtaining and/or   
reviewing separately obtained   
history, performing a medically   
appropriate examination,   
counseling and educating the   
patient/family/caregiver, ordering   
medications, tests, or procedures,   
communicating with other HCPs (not   
separately reported), and   
communicating results to the   
patient/family/caregiver.  
  
Tello Ortega DO  
Electronically signed by Tello Ortega DO at 2023 4:34 PM   
EDT  
documented in this encounter            [facility]  
   
                                                    08- Miscellaneous   
Notes                                   Formatting of this note might be   
different from the original.  
Noted.  
Electronically signed by   
Júnior Ahuja MD at   
08/15/2023 12:31 PM EDT  
Formatting of this note might be   
different from the original.  
Ngoc OT with [facility] calls to let   
provider know that patient HR was   
out side of their parameters   
today.  and then 44. At end   
of session it was 68.   
Asymptomatic.  
  
Dr. Dotson's office also notified.   
Patient missed his appointment   
with Dr. Dotson last week and will   
reschedule.  
  
Melva Khalil, GERONIMO  
  
Electronically signed by   
Melva Khalil RN at   
2023 11:44 AM EDT  
documented in this encounter            [facility]  
   
                                2023 Note                 [facility]  
   
                                                    2023 Miscellaneous   
Notes                                   Formatting of this note might be   
different from the original.  
José Antonio PT calling from [facility] [facility]   
[facility] of care for patient   
and physical therapy will visit   
patient 2 times a week for 2 weeks   
and 1 time a week for 1 week.   
Physical therapy will work with   
patient on functional mobility. No   
call back needed.  
  
Angela Pham RN  
  
Electronically signed by Angela Pham RN at 2023 3:57   
PM EDT  
documented in this encounter            [facility]  
   
                                                    2023 Miscellaneous   
Notes                                   Formatting of this note might be   
different from the original.  
[facility] notified.  
Electronically signed by Julia Shaffer Ma at 2023 3:58 PM EDT  
Formatting of this note might be   
different from the original.  
PCP agrees and will follow  
ALEXIA Roy.CNP  
Electronically signed by Krista Perez APRN.VARINDER at 2023 3:55   
PM EDT  
Formatting of this note might be   
different from the original.  
[facility] RN calling from [facility] to   
report plan of care for patient   
and SN will visit patient 1 times   
a week for five weeks. SN will   
work with patient on monitoring   
and education of new diagnosis of   
Afib.  
  
[facility] needs verbal order for POC.   
After receives verbal order 485   
form will be completed and faxed   
to provider at [phone].  
  
Please call verbal order to [facility]   
at [phone].  
  
Melva Khalil RN  
Electronically signed by   
Melva Khalil RN at   
2023 3:41 PM EDT  
documented in this encounter            [facility]  
   
                                                    2023 Miscellaneous   
Notes                                   Formatting of this note might be   
different from the original.  
Below response left on identified   
vm.  
Helen Zollinger LPN  
  
Electronically signed by   
Zollinger, Helen E LPN at   
2023 1:00 PM EDT  
Formatting of this note might be   
different from the original.  
I will follow HH.  
Electronically signed by   
Júnior Ahuja MD at   
2023 12:42 PM EDT  
Formatting of this note might be   
different from the original.  
Kimmy with [facility] calling. Patient   
discharging from [facility], diagnosis   
A-Fib, post-fall. Pt has orders   
for  Nursing, PT and OT and   
asking if PCP willing to follow   
for these orders? They would like   
to start seeing patient tomorrow,   
on .  
  
Please call Kimmy back with   
approval at [phone].  
  
Lenore Lee RN  
  
Electronically signed by Lenore Lee RN at 2023 10:00 AM   
EDT  
documented in this encounter            [facility]  
   
                                                    2023 Miscellaneous   
Notes                                   Formatting of this note might be   
different from the original.  
Called and left a detailed   
voicemail notifying patient's   
daughter of providers message.   
Hospital phone number was left in   
case she had any questions.  
  
Nathalia Peterson RN  
  
Electronically signed by Nathalia Peterson RN at 2023 8:26 AM   
EDT  
Formatting of this note might be   
different from the original.  
Discuss at upcoming appointment   
later this month and for face to   
face requirement.  
Electronically signed by Júnior Ahuja MD at 2023 11:41   
PM EDT  
Formatting of this note might be   
different from the original.  
Dixie, pt's daughter called and   
would like to get an order for OT   
home health. Pt's mobility is   
decreasing. Daughter will check to   
see which Home Health Agency is   
covered by pt's insurance.. She   
will call back.  
  
Chantel Aldana LPN  
  
Electronically signed by Chantel Aldana LPN at 2023 4:35   
PM EDT  
documented in this encounter            [facility]  
   
                                                    2023 Miscellaneous   
Notes                                   Formatting of this note might be   
different from the original.  
Appeal letter for xifaxan faxed to   
[employer] appeal department for   
review.  
Electronically signed by Neno Bingham RN at 2023 1:03 PM   
EDT  
documented in this encounter            [facility]  
   
                                                    2023 Miscellaneous   
Notes                                   Formatting of this note might be   
different from the original.  
  
  
Spoke with patient: Yes  
Confirmed date scheduled and   
patient report time: Yes  
Procedure Planned:Colonoscopy with   
or without biopsies based on   
clinical findings  
Esophagogastroduodenoscopy(EGD)   
with or without biopies based on   
clinical findings, removal of   
polyps or lesions  
Is the patient on blood   
thinners?no  
Procedure Instructions given to   
patient: Yes, and they verbalized   
their understanding of   
instructions given  
Patient instructed to take   
prescribed preparation prior to   
procedure:Yes, and they verbalized   
their understanding of   
instructions given  
Patient instructed to have   
family/friend present for   
procedure transport   
home:Patient/patient   
representative was told that if   
they do not have a responsible   
adult accompany them to their   
procedure; and remain in the   
endoscopy area until they are   
discharged; that their procedure   
cannot be done with sedation or   
anesthesia and may be cancelled.   
and They verbalized their   
understanding and agree to have a   
responsible adult accompany the   
patient to their procedure and   
remain in the endoscopy area.  
Any barriers to Patient learning:   
Patient/Patient Representative   
responded appropriately on phone.  
Type of instruction given: Verbal   
by telephone contact.  
Trinidad Dumont LPN  
Electronically signed by Trinidad Dumont LPN at 2023 4:01 PM   
EDT  
documented in this encounter            [facility]  
   
                                2023 Note                 [facility]  
   
                                                    2023 History of   
Present illness Narrative               Formatting of this note might be   
different from the original.  
Q3 FRANSICO Triage Note  
  
Pt scheduled for upcoming   
endoscopic evaluation in Q3. Chart   
reviewed, no apparent   
contraindication at this time   
based on Q3 Indications for   
Anesthesia Consult and OR Cases.   
Final Anesthesia review and   
clearance will be performed on the   
day of the procedure, this note   
does note serve as procedural   
clearance.  
  
Seferino Barbosa PA-C  
  
Electronically signed by Seferino Barbosa PA-C at 2023 9:52   
AM EDT  
documented in this encounter            [facility]  
   
                                                    2023 Miscellaneous   
Notes                                   Formatting of this note is   
different from the original.  
TC Dixie/daughter, advised refill   
for Oxybutynin (Ditropan) was sent   
to [facility], 3/2/2023 for   
180 tablets, 3 refills, 1 year   
supply.  
  
Dixie did not request refill for   
Lisinopril, does not want Patient   
taking, BP is already low and   
drops it lower. Only needing   
refill of Nitro.  
  
Patient has been identified by   
name and date of birth: Yes  
Dixie/daughter phones for   
refill(s):  
Requested Prescriptions  
  
Pending Prescriptions Disp Refills  
nitroglycerin sublingual   
(NITROQUICK) 0.4 mg SL tablet 25   
tablet 1  
Sig: Dissolve 1 tablet under the   
tongue every 5 minutes as needed   
for chest pain. FOR CHEST PAIN. IF   
NO RELIEF CALL 911  
  
Date of last office visit in   
primary care: 3/20/2023  
6 month follow-up: 2023  
Last 2 Encounter Wt Readings:  
Date: Wt:  
2023 103.9 kg (229 lb)  
2023 107.3 kg (236 lb 9.6   
oz)  
Previous labs/tests for   
medication: Blood Pressure:  
BUN (mg/dL)  
Date Value  
2023 27  
2022 28  
  
Sodium (mmol/L)  
Date Value  
2023 139  
2022 139  
Last 1 Encounter BP Readings:  
Date: BP:  
2023 121/64  
Please advise. Thank you. Helen Zollinger LPN  
  
Electronically signed by Helen E Zollinger LPN at 2023 2:56   
PM EDT  
Formatting of this note is   
different from the original.  
Patient was seen 3/20/23 by Dr. Ahuja and the note on the   
encounter for patient other refill   
request states he was never seen   
in their office but he was. He was   
also asking for lisinopril in the   
last request but no one ever   
called the patient or responded.   
Patient has been identified by   
name and date of birth: Yes  
  
Requested Prescriptions  
  
Pending Prescriptions Disp Refills  
nitroglycerin sublingual   
(NITROQUICK) 0.4 mg SL tablet 25   
tablet 1  
Sig: Dissolve 1 tablet under the   
tongue every 5 minutes as needed   
for chest pain. FOR CHEST PAIN. IF   
NO RELIEF CALL 911  
oxybutynin ER (DITROPAN XL) 15 mg   
24 hr Extended Rel Tab 180 tablet   
3  
Sig: Take 2 tablets by mouth once   
daily.  
  
RX INSTRUCTIONS:  
Patient aware RX will be sent to   
pharmacy. No need to notify   
patient.  
  
Vane Méndez Pss  
  
Electronically signed by Vane Méndez Pss at 2023 2:42 PM   
EDT  
documented in this encounter            [facility]  
   
                                2023 Note                 [facility]  
   
                                2023 Note                 [facility]  
   
                                                    2023 History of   
Present illness Narrative               Formatting of this note might be   
different from the original.  
MITESH BRAUN  
[id number]  
2023  
  
[facility]  
Department of Radiation Oncology  
[facility]  
  
RADIATION ONCOLOGY: COMPLETION   
NOTE  
  
DATE OF SIMULATION: 2023  
  
DATES OF TREATMENT: 2023 to   
2023  
  
TREATMENT MACHINE: Edge2  
  
TREATMENT AREA: Liver HCC  
  
DIAGNOSIS: 83 year old man with   
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  
  
CONCURRENT THERAPY: No.  
  
DELIVERED DOSE: The Liver HCC PTV   
received a total dose of 3000 cGy   
in 1 fractions at 3000   
cGy/fraction prescribed to Max   
Dose at 73.5% IDL using 10 MV   
photons with SBRT Coplanar VMAT   
technique. CBCT was used for IGRT.  
  
ELAPSED DAYS: 0  
  
TOLERANCE: At last on-treatment   
visit, his toxicity was summarized   
as excellent with no acute   
toxicities.  
  
RESPONSE: To be assessed in   
outpatient clinic.  
  
REMARKS: The patient will be seen   
again in follow up in 3-4 months   
with MRI liver + labs.  
  
Resident Physician  
Moises Kaplan M.D.  
  
Staff Physician  
Marin Fish M.D  
37:50 AM  
Electronically Signed  
  
cc:  
Júnior Ahuja  
[address] [place] [address]  
Phone: [phone]  
Fax: [phone]  
  
Dr. Ortega, [facility]  
  
  
Electronically signed by Marin Fish MD at 2023 7:50 AM   
EDT  
documented in this encounter            [facility]  
   
                                                    2023 Miscellaneous   
Notes                                   Formatting of this note might be   
different from the original.  
Pt never seen in our department  
Electronically signed by Bere Cali MA at 2023 10:54 AM   
EDT  
Formatting of this note is   
different from the original.  
Patient has been identified by   
name and date of birth: Yes  
  
Requested Prescriptions  
  
Pending Prescriptions Disp Refills  
lisinopril (ZESTRIL) 40 mg tablet   
30 tablet 11  
Sig: Take 1 tablet by mouth once   
daily.  
nitroglycerin sublingual   
(NITROQUICK) 0.4 mg SL tablet 25   
tablet 3  
Sig: Dissolve 1 tablet under the   
tongue as needed. FOR CHEST PAIN.   
IF NO RELIEF CALL 911  
oxybutynin ER (DITROPAN XL) 15 mg   
24 hr Extended Rel Tab 90 tablet 3  
Sig: Take 1 tablet by mouth once   
daily.  
  
RX INSTRUCTIONS:  
Patient aware RX will be sent to   
pharmacy. No need to notify   
patient.  
  
Miranda Bejarano  
Electronically signed by Miranda Bejarano at 2023 10:40 AM EDT  
documented in this encounter            [facility]  
   
                                2023 Note                 [facility]  
   
                                        2023 Nurse Note Formatting of this  
 note might be   
different from the original.  
Mitesh Braun is here for an   
appointment today with Nurse and   
experienced a fall during his   
clinical visit.  
Location of fall: [place]-, Exam   
rm 16  
  
Brief Factual Description: The   
patient experienced loss of   
balance while changing with the   
assistance from his daughter   
Dixie. Exam room door was closed.   
Daughter opened up the door to ask   
for help to get patient back into   
his w/c. Pt was found with both   
hands on the sink and kneeling on   
his left knee. Pt assisted back to   
his w/c. Pt denies hitting his   
head and denies any pain or   
discomfort. No injury to left   
knee. VS obtained and stable. Dr. Fish aware and into see   
patient and his daughter. Patient   
declined further evaluation and   
left unit by w/c with his daughter   
Dixie.  
Contributing Factors: lost balance   
and failure to call for assistance  
Did patient hit head or neck? No  
  
Is the patient having any pain? No  
  
Is patient alert? YES  
  
Injury: No Injury Noted  
  
INTERVENTIONS:  
  
Immediate Actions Taken: Provider   
notified  
Vitals completed  
  
Name of LIP Notified: Dr. Marin Fish  
Additional Prevention   
Measures:instructed Patient to   
remain seated, instructed pt. to   
call for help as needed, door to   
room left opened , offered   
assistance with   
transfers/clothing, and pt refused   
interventions/assistance.  
  
Electronically signed by Janet Arboleda LPN at 2023 3:44   
PM EDT  
documented in this encounter            [facility]  
   
                                                    2023 History of   
Present illness Narrative               Formatting of this note might be   
different from the original.  
  
Radiation Oncology Nursing Note  
  
PATIENT NAME: Mitesh Braun  
PATIENT MRN: [id number]  
  
[facility] FACILITY/LOCATION: Main   
[place]  
  
PROCEDURE: Contrast Injection for   
CT Simulation  
  
Safety Checks  
Patient identified using 2   
identifiers: Yes  
NPO x 4 hours verified: Yes  
Creatinine level drawn within the   
last 60 days: Yes  
Creatinine level within normal   
limits: Yes  
IV start: Time: 1355. #22g   
angiocath placed in the Left AC.   
Positive blood return verified:   
Yes  
Physician order for contrast   
injection verified: Yes  
Patient's allergies verified,   
including CT contrast: Yes  
Is the patient having any pain?   
None. 0 on a scale of 0 to 10.  
Concerns about physical or   
emotional abuse: No  
Fall risk: Yes, At risk due to:   
unsteady gait, weakness, and use   
of a wheelchair. LIP Notified.  
  
SIGNED by Janet Arboleda LPN  
  
Radiation Therapy - Patient   
Education Note  
  
PATIENT NAME: Mitesh Braun  
PATIENT MRN: [id number]  
  
May 5, 2023  
  
[facility] FACILITY/LOCATION: Main   
[place]  
  
READINESS TO LEARN  
Cognitive Ability: Alert and   
oriented  
Motivation to learn: Eager  
Family Support: High - Very   
involved in pt care  
Instruction provide to: Patient   
and family member  
Patient learns best by: Individual   
Instruction  
Written Instruction - Hand-outs  
Verbal Instruction  
Factors effecting learning: None  
Physical limitations effecting   
learning: Limited Mobility  
  
LEARNING RESPONSE  
Diagnosis: Pt simulated today for   
radiation therapy to abdomen.  
Education Topic/Teaching Points:   
Radiation therapy, Side effects,   
and OTV:  
Method of instruction: Individual   
instruction  
Written instruction - handouts  
Verbal instruction  
Patient /Family response: Patient   
and family verbalized   
understanding of radiation   
treatments, side effects, OTV, and   
transportation.  
Follow-up plan: Patient instructed   
to call with any further issues  
Supplemental material:   
Informational handouts on SBRT   
Liver Handout.  
Referral (recommendation): None,   
Pt denied need for social work,   
van service, and dietician.  
  
Was  approved? Clinical   
questionnaires incomplete due to   
Patient declined to complete or   
answer questions with nurse  
  
Signed by: Janet Arboleda LPN  
Electronically signed by Janet Arboleda LPN at 2023 2:29   
PM EDT  
documented in this encounter            [facility]  
   
                                2023 Note                 [facility]  
   
                                2023 Note                 [facility]  
   
                                2023 Note                 [facility]  
   
                                                    2023 History of   
Present illness Narrative               Formatting of this note might be   
different from the original.  
MITESH BRAUN.  
[id number]  
2023  
  
[facility]  
Department of Radiation Oncology  
[facility]  
  
RADIATION ONCOLOGY - Therapist   
Injection Note  
  
  
Procedure: Contrast Injection for   
CT Simulation  
  
Safety Checks:  
Patient identified using two   
identifiers: Yes Physician order   
for contrast injection verified:   
Yes Patient's allergies verified,   
including CT contract: Yes eGFR   
verified: Yes  
Omnipaque:  300  mg/ml Volume:   
150cc  
Time contrast administered: 2:45   
pm  
  
Complications/Reaction: Yes No  
  
Patient Disposition:  
Patient IV access: Peripheral  
Time IV removed: 3:15 pm  
Patient/family provided with   
treatment instructions: Yes   
Patient/family verbally   
acknowledged understanding of   
treatment instructions: Yes  
Disposition: home  
  
Therapist: hola/bang  
  
  
Electronically signed by [facility]   
Provider at 2023 3:19 PM EDT  
documented in this encounter            [facility]  
   
                                                    2023 History of   
Present illness Narrative               Formatting of this note might be   
different from the original.  
MITESH BRAUN  
[id number]  
  
2023  
  
[facility]  
Department of Radiation Oncology  
[facility]  
  
RADIATION ONCOLOGY SIMULATION NOTE  
  
DATE OF SIMULATION: 2023  
  
MACHINE: CT Simulator  
  
DIAGNOSIS: 83-year-old male with   
newly diagnosed radiographic HCC   
per MRI obtained 2023 showing   
a 4.1 cm tumor in segment 4A.   
Systemic staging, labs for   
classification (child Coffey, MELD)   
pending. Tentatively intermediate   
stage per BCLC, cT2 N0 MX stage II   
per AJCC.  
  
AREA:Segment 4a liver HCC.  
  
PATIENT POSITION: Supine.  
  
CONTRAST: 150cc IV omnipaque.  
  
PROTOCOL: None  
  
BLOCKS: Custom blocks are   
necessary to develop an optimal   
plan.  
  
FIXATION DEVICE: In order to   
achieve accurate and reproducible   
treatments, the patient is   
immobilized with a bodyfix device   
and ABC for respiratory motion   
management.  
  
PROCEDURE: A time-out was   
conducted and recorded by the   
therapist. Patient was simulated   
on the CT scanner for external   
beam radiation therapy. Use of the   
ABC device for respiratory motion   
management/ITV creation was   
reviewed at the time of simulation   
and found to be adequate.   
Treatment site was marked by the   
simulation therapist.  
  
ASSESSMENT/PLAN: Patient tolerated   
simulation procedure well.   
Treatments will be initiated after   
treatment planning. The patient   
will be scheduled for a   
verification simulation on the   
treatment machine to ensure proper   
set-up and field arrangement is   
correct prior to the first   
treatment of primary and any boost   
fields if applicable.  
  
Electronically Signed  
Marin Fish M.D.  
34:47 PM  
  
  
Electronically signed by Marin Fish MD at 2023 4:47 PM   
EDT  
documented in this encounter            [facility]  
   
                                                    2023 Miscellaneous   
Notes                                   Formatting of this note is   
different from the original.  
RADIATION ONCOLOGY NURSING PRE-SIM   
CALL  
  
Today's date: May 4, 2023  
Time: 11:32 AM  
  
SIM date: 23  
  
Spoke with patient's daughterDixie. Patient instructed to be   
NPO after 10am, daughter   
verbalized understanding.  
  
SIM needs:  
Do you have a Mediport or PICC: No  
  
Anxiety / Claustrophobia History:   
No  
  
Pain Needs:  
Is that patient having pain that   
will impact SIM? No  
Will patient need pain medication   
prior to SIM? No  
  
Last Creatinine:  
Creatinine  
Date Value Ref Range Status  
2023 1.05 0.73 - 1.22 mg/dL   
Final  
]  
Last BUN:  
BUN  
Date Value Ref Range Status  
2023 27 (H) 9 - 24 mg/dL   
Final  
  
Last GFR: No results found for:   
EGFR  
IV Contrast: No  
Diabetic: No  
Pregnancy Status: Patient is male  
  
Maribell Hart RN  
Electronically signed by Maribell Hart RN at 2023 11:37   
AM EDT  
documented in this encounter            [facility]  
   
                                2023 Note                 [facility]  
   
                                2023 Note                 [facility]  
   
                                2023 Note                 [facility]  
   
                                2023 Note                 [facility]  
   
                                                    2023 History of   
Present illness Narrative               Formatting of this note might be   
different from the original.  
[facility]   
[facility]acy received prescription(s)   
for Xifaxan from Dr. Juanito Kaufman's office. Benefits   
investigation was conducted,   
indicating that a prior   
authorization is required by   
patient's medicare part D   
insurance plan with [employer].  
  
Encounter will be updated once   
prior authorization has been   
submitted by [facility]   
[facility].  
  
Mari Giles [facility]   
[facility]  
[id number] [place] [address]  
[email or web address]  
[phone]  
[phone]  
  
Electronically signed by Mari Giles (Pharmacy Tech) at   
2023 11:50 AM EDT  
Formatting of this note might be   
different from the original.  
Xifaxan PA was initiated and   
pending review.  
  
Plan Name: [employer] med d  
Plan Agent/Key: [email or web address]   
(key [id number])  
Phone: [phone]  
Case: [id number]  
Timeline: 24-72 hrs  
  
Mari Giles (Pharmacy Tech)  
Electronically signed by Mari Giles (Pharmacy Tech) at   
2023 3:58 PM EDT  
documented in this encounter            [facility]  
   
                                2023 Note                 [facility]  
   
                                        2023 Instructions   
  
  
Juanito Kaufman MD -   
2023 9:20 AM EDTFormatting   
of this note might be different   
from the original.  
As discussed in the office, you   
have a diagnosis of cirrhosis.  
The main complications of   
cirrhosis are:  
1. Ascites (fluid in the belly)  
2. Hepatic Encephalopathy   
(confusion)  
3. Varices (veins in the esophagus   
that can bleed)  
4. Hepatocellular Carcinoma (liver   
cancer)  
  
Should you develop any new   
symptoms or have worsening   
symptoms please contact our office   
immediately.  
  
You should go to the nearest   
emergency room and call our office   
immediately if any of the   
following occur:  
1. Temperature of 101 or above  
2. Confusion  
3. Vomiting blood  
4. Multiple black or red stools  
5. Shortness of breath  
6. Severe diarrhea  
7. Vomiting for more than twice in   
24 hours  
  
-Call my office to discuss any   
surgeries (elective or emergent)   
since cirrhotic patients are at an   
increased risk for surgery in   
terms of infection, bleeding, and   
even death.  
-Call my office if you are ever   
seen in the ER or admitted to the   
hospital.  
  
General Instructions:  
-Liver cancer prevention: You will   
need a picture of your liver with   
an ultrasound or MRI every 6   
months to screen for liver cancer.   
Each year you are at a small risk   
of liver cancer, and so this step   
is important to preventing the   
development of liver cancer and   
catching it early.  
  
-Diet/exercise:  
-It is very important to limit the   
amount of salt you eat on a daily   
basis. Salt (or  sodium  as it is   
labeled on most food labels) can   
lead to retention of fluid in the   
belly and legs. Limiting sodium to   
< 2000 mg (or 2 grams) is key to   
prevent this complication. Salt is   
hidden in nearly everything (even   
sweet foods!) so it is very   
important to read labels before   
cooking with or eating something.   
One of the best ways to know and   
limit how much salt you eat is to   
cook/prepare as much fresh food as   
you can.  
-In cirrhosis, your muscles tend   
to waste/atrophy, and so it is   
important to get a sufficient   
amount of protein in the diet.   
Plant-based protein and lean cuts   
of meat or fish are healthy. Try   
to avoid red meat if possible.  
-Avoid all avoid raw shellfish and   
undercooked meat- eating this can   
put you at risk of developing   
certain very serious infections.  
-Abstain from all alcohol intake.   
Even a small amount can have   
tremendous impact on your liver   
health.  
Try and participate in daily   
exercise or joyful movement every   
day.  
  
Medications:  
-You will likely be on many   
medications and it can sometimes   
be very difficult to remember when   
to take them. Something like a   
pill box or a phone alarm can be   
very helpful in reminding you when   
you should take your medications.  
-Take no more than 2 grams of   
tylenol in 24 hours (a sick liver   
cannot effectively process certain   
medications and too much tylenol   
can be dangerous to the liver and   
even lead to liver failure).  
-Because the liver cannot process   
certain medications as effectively   
when you have cirrhosis, avoid   
medications such as Benadryl,   
benzodiazepines (Ativan, valium,   
etc), and certain opiate pain   
medications if possible (tramadol,   
norco, oxycontin, dilaudid,   
morphine). These medications can   
lead to worsening confusion and   
sleepiness.  
-Avoid medications like Ibuprofen,   
Motrin, Aleve, Excedrin. These   
medications are non-steroidal   
anti-inflammatory drugs (NSAIDs)   
and can put lots of undue stress   
on the kidneys (which are   
particularly sensitive in patients   
with cirrhosis).  
-Call my office to discuss the   
risks and benefits of any new   
medications ( prescribed or over   
the counter) before taking the new   
medication.  
  
Other/general:  
-You should ask your primary care   
to ensure you have been vaccinated   
for pneumonia and the flu  
-If you are starting to struggle   
with confusion and memory loss, it   
is NOT safe to drive a vehicle or   
operate heavy machinery. Having   
cirrhosis with confusion can lead   
to impaired reflexes, and driving   
under these circumstances is like   
driving under the influence of   
drugs or alcohol. Please talk to   
your doctor (and our office) if   
you or a family member notices you   
are struggling with this.  
  
For more information about   
cirrhosis and how to navigate   
living with this disease, one   
excellent website is   
[email or web address].[email or web address]  
This provides lots of good   
resources for patients and their   
family members. Highly recommend   
this!  
Electronically signed by Juanito Kaufman MD at 2023 9:20 AM   
EDT  
  
documented in this encounter            [facility]  
   
                                                    2023 History of   
Present illness Narrative               Formatting of this note is   
different from the original.  
HCC Clinic New Patient  
Date of visit:  
  
CC: new diagnosis of HCC  
  
HPI: This is a 83 year old with   
medical history of   
well-compensated CÁRDENAS-related   
cirrhosis (MELD-Na 8) complicated   
by HCC (within Dario), possible   
HE, PUD (bled ) here for   
follow-up  
Follows with Maurizio Og for CÁRDENAS  
  
Long-standing history of NAFLD  
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   
CÁRDENAS-related cirrhosis (given his   
risk factors) in 2022 on   
further workup of anemia. Saw   
hematology/oncology for this,   
eventually had liver imaging which   
noted a mass in 2022, recent   
MRI in April with cirrhosis along   
with a 4.1 cm HCC in seg 4A  
Case discussed at tumor board and   
he was referred for SBRT (limited   
options given age, functional   
limitations, etc)  
He met with radiation oncology   
just recently and is eager to get   
treatment started  
  
With respect to his liver-related   
symptoms:  
Sleeps a lot in the daytime, not   
so much at night. Possibly a bit   
more confusion when he talks.  
Has at least 4-5 soft bowel   
movements/day,  black as coal    
because he is taking iron   
supplements.  
No abdominal distention. Takes   
lasix for LE edema  
No jaundice, pruritis  
  
HCC history:  
Diagnosis date: 2022  
Initial AFP: 8.5  
Biopsy-proven?: no  
Imaging: MRI  
Within Dario: Yes  
CPT score: A  
ECOG performance status: 1  
  
Treatment history:  
LT candidate: no  
Resection: no  
Locoregional therapy: getting SBRT  
  
Review of Systems:  
See note  
  
Past Medical History:  
PAST MEDICAL HISTORY  
Diagnosis Date  
Adenomatous colon polyp 8/3/2011  
Adjustment disorder with depressed   
mood 3/9/2012  
Aortic valve disorders 2007  
stenosis  
Asthma exacerbation 11/10/2012  
Calculus of ureter 2005  
Esophageal reflux 10/14/2011  
Essential hypertension 10/14/2005  
Impaired renal function 4/15/2010  
Impotence of organic origin   
2006  
Intestinal polyp 8/3/2011  
Nonspecific abnormal results of   
liver function study 2006  
Obesity, unspecified  
CAREN on CPAP 10/13/2005  
Other abnormal blood chemistry  
Hyperuricemia  
Other and unspecified   
hyperlipidemia 10/14/2005  
Peripheral polyneuropathy 2/3/2015  
Polycystic kidney 2010  
Snoring  
Splenomegaly 9/10/2014  
Thrombocytopenia (HCC) 10/5/2011  
  
Family History:  
FAMILY HISTORY  
Problem Relation Age of Onset  
Heart Mother  
 in 70's  
Coronary Artery Disease Father  
 in his 80's  
Emphysema Father  
Diabetes Brother  
  
Social History:  
Tobacco: denies  
EtOH: denies  
Substances: denies  
  
I have confirmed and edited as   
necessary, the PFSH and ROS   
obtained by others.  
  
Current Outpatient Medications on   
File Prior to Visit  
Medication Sig  
oxybutynin ER (DITROPAN XL) 15 mg   
24 hr Extended Rel Tab Take 2   
tablets by mouth once daily.  
furosemide (LASIX) 20 mg tablet   
TAKE 1 TABLET BY MOUTH EVERY OTHER   
RDAY  
Diaper,Brief, Adult,Disposable   
(DEPEND EASY FIT UNDERGARMENTS) 1   
Each three times daily.  
ascorbic acid, vitamin C, (VITAMIN   
C) 500 mg tablet Take 1 tablet by   
mouth once daily.  
ascorbic acid-elderberry fruit   
100-50 mg chew Take 2 tablets by   
mouth once daily.  
cyanocobalamin (VITAMIN B-12)   
2,500 mcg tablet Take 2,500 mcg by   
mouth once daily.  
aspirin, enteric coated (ASPIRIN,   
ENTERIC COATED) 81 mg EC tablet   
Take 81 mg by mouth once daily.  
albuterol HFA (PROVENTIL HFA,   
VENTOLIN HFA) 90 mcg/actuation   
inhaler Inhale 2 Puffs as   
instructed every 4 hours as needed   
for wheezing/shortness of breath.  
omeprazole (PRILOSEC) 40 mg   
capsule Take 1 capsule by mouth   
once daily.  
allopurinol (ZYLOPRIM) 300 mg   
tablet Take 1 tablet by mouth once   
daily.  
magnesium oxide 400 mg magnesium   
cap Take 1 capsule by mouth once   
daily.  
fenofibrate nanocrystallized   
(TRICOR) 48 mg tablet Take 1   
tablet by mouth once daily.  
sertraline (ZOLOFT) 50 mg tablet   
Take 1 tablet by mouth once daily.  
nitroglycerin sublingual   
(NITROQUICK) 0.4 mg SL tablet   
Dissolve 1 tablet under the tongue   
every 5 minutes as needed for   
chest pain. FOR CHEST PAIN. IF NO   
RELIEF CALL 911  
fluticasone (FLONASE) 50   
mcg/actuation nasal spray Use 1   
Spray in each nostril once daily.  
ferrous sulfate 325 mg (65 mg   
iron) EC tablet Take 1 tablet by   
mouth twice daily with meals.   
(Patient taking differently: Take   
325 mg by mouth once daily.)  
fluticasone-vilanterol (BREO   
ELLIPTA) 200-25 mcg/dose inhaler   
Inhale 1 Inhalation as instructed   
once daily. Inhale one puff once   
daily. DO NOT CLICK OPEN UNTIL   
READY FOR DOSE  
multivitamin tablet Take 1 tablet   
by mouth once daily.  
torsemide (DEMADEX) 20 mg tablet   
Take 2 tablets by mouth once daily   
for 5 days.  
melatonin 10 mg tab Take 1 tablet   
by mouth as needed.  
amoxicillin (POLYMOX, AMOXIL) 500   
mg capsule Take four tablets, one   
hour prior to dental work (Patient   
not taking: Reported on 2023)  
  
No current facility-administered   
medications on file prior to   
visit.  
  
Vitals:  
Ht 5' 10  (1.78m)   Wt 246 lb 12.8   
oz (111.9kg)   BMI 35.41 kg/(m^2).  
  
  
Physical Exam:  
Well appearing, in NAD  
No jaundice  
Aox3  
In a wheelchair  
  
Laboratory:  
MELD-Na score: 8 at 2023 2:27   
PM  
MELD score: 8 at 2023 2:27 PM  
Calculated from:  
Serum Creatinine: 1.09 mg/dL at   
2023 2:27 PM  
Serum Sodium: 140 mmol/L (Using   
max of 137 mmol/L) at 2023   
2:27 PM  
Total Bilirubin: 0.5 mg/dL (Using   
min of 1 mg/dL) at 2023 2:27   
PM  
INR(ratio): 1.1 at 2023 2:27   
PM  
Age: 83 years  
  
Disclaimer: The MELD Calculator   
cannot calculate MELD scores for   
patients on dialysis.  
  
AFP  
Lab Results  
Component Value Date  
AFP 8.5 2023  
  
Imaging:  
MRI Liver 2022:  
Lesion#: 1  
Location: Segment Keren; (series 15   
image 19)  
Size (maximum long-axis   
dimension): 4.1 cm, Prior size:   
N/A  
Arterial Phase Hyperenhancement   
(APHE): Nonrim APHE  
Wash-out: Yes - non-peripheral  
Enhancing Capsule: Yes  
Threshold growth (50% or more   
growth in 6 months or less): N/A  
Ancillary features favoring   
malignancy or HCC: Fat in mass   
more than  
adjacent liver  
Ancillary features favoring   
benignity: N/A  
LI-RADS Category: LR-5 (definitely   
HCC)  
OPTN Class 5 Category:5B  
  
Hepatic vasculature and   
collaterals: ...  
  
Portal venous system (splenic   
vein, main portal vein, left and   
right  
anterior and right posterior   
portal vein branches): Patent.  
Portal vein diameter: 1.6 cm.  
Celiac trunk and SMA: Patent. No   
stenosis.  
Hepatic artery: Patent. Replaced   
right hepatic artery from SMA.  
Hepatic veins: Patent.  
Collaterals:  
Spontaneous splenorenal shunt:   
Absent  
Recanalized paraumbilical vein:   
Small  
Esophageal varices: Absent.  
Mesenteric portosystemic   
collaterals: Absent  
  
Related extrahepatic findings:  
  
Spleen: 13.7 cm (craniocaudally),   
enlarged. No mass.  
  
Mesentery/Peritoneum: Trace   
ascites. No mass.  
  
Other findings:  
  
Biliary: No bile duct dilation.   
Gallbladder present with   
cholelithiasis.  
  
Pancreas: No mass or duct   
dilation.  
  
Adrenals: No mass.  
  
Kidneys: Multiple bilateral cysts.   
No solid mass. No hydronephrosis.  
  
GI: No dilated bowel or wall   
thickening along imaged segments.   
Small  
hiatal hernia  
  
Lymph nodes: No abdominal   
lymphadenopathy.  
  
Vasculature: No abdominal aortic   
aneurysm.  
  
Bones/Soft Tissues: Degenerative   
change. Focal enhancement in the   
right  
L1 transverse process, likely   
posttraumatic. No other osseous   
lesion.  
  
Lower chest: Left lower lobe   
mucoid impaction. Bronchial wall   
thickening.  
  
Endoscopy:  
EGD :  
Findings:  
The examined jejunum was normal.  
The examined duodenum was normal.  
Localized moderate inflammation   
characterized by erosions,   
erythema  
and friability was found in the   
gastric antrum. Biopsies were   
taken  
with a cold forceps for histology.  
A medium-sized hiatal hernia was   
present.  
Non-severe esophagitis with no   
bleeding was found.  
  
Assessment and plan:  
This is a 83 year old with medical   
history of well-compensated   
CÁRDENAS-related cirrhosis (MELD-Na 8)   
complicated by HCC (within [place]),   
possible HE, PUD (bled ) here   
for follow-up, doing well  
We discussed the natural   
progression of his liver disease   
and signs/symptoms to look out for   
which would suggest progression of   
disease  
He is well-compensated at the   
moment which bodes well for   
treatment options in the future if   
he were to progress from a cancer   
standpoint  
  
Cirrhosis:  
HE: sounds as if he has HE but has   
loose stool throughout the day.   
Will check zinc, start rifaximin  
EV: EGD ordered, needs to schedule  
Ascites/SBP: none on imaging  
HCC: Planning on SBRT for his HCC   
which is appropriate given his age   
and comorbidities. Needs CT chest   
so will make sure that is   
scheduled  
  
RTC 3 months  
  
I spent a total of 45 minutes on   
the date of the service which   
included preparing to see the   
patient, face-to-face patient   
care, completing clinical   
documentation, obtaining and/or   
reviewing separately obtained   
history, performing a medically   
appropriate examination,   
counseling and educating the   
patient/family/caregiver, ordering   
medications, tests, or procedures,   
and communicating with other HCPs   
(not separately reported).  
  
Juanito Kaufman MD  
Associate Staff, Department of   
Gastroenterology and Hepatology  
[facility]   
[facility]  
  
--  
ASSESSMENT/PLAN:  
1. 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   
ascites, unspecified hepatic   
cirrhosis type (HCC) - ICD9:   
571.5, ICD10: K74.60  
- ZINC BLD  
- HEPATIC FUNCTION PNL  
- PROTHROMBIN TIME/PT  
- CBC + DIFF  
- BASIC METABOLIC PNL  
  
Juanito Kaufman MD  
Electronically signed by Juanito Kaufman MD at 2023 10:27   
AM EDT  
documented in this encounter            [facility]  
   
                                2023 Note                 [facility]  
   
                                                    2023 History of   
Present illness Narrative               Formatting of this note is   
different from the original.  
Images from the original note were   
not included.  
Radiation Oncology - New   
Patient/Consult Note  
  
PATIENT NAME: Mitesh Braun  
PATIENT MRN: [id number]  
  
REQUESTING PROVIDER: Tello Ortega DO  
  
DIAGNOSIS: 83-year-old male with   
newly diagnosed radiographic HCC   
per MRI obtained 2023 showing   
a 4.1 cm tumor in segment 4A.   
Systemic staging, labs for   
classification (child Coffey, MELD)   
pending. Tentatively intermediate   
stage per BCLC, cT2 N0 MX stage II   
per AJCC. Discussed at tumor board   
and referred for consideration of   
SBRT.  
  
HPI: 83 year old male who presents   
with above diagnosis, for an   
opinion regarding the role of   
radiation therapy in the   
management of the patient's   
disease. Final recommendations   
will be communicated back to the   
requesting physician by way of the   
shared medical record, or letter   
to requesting physician via US   
mail.  
  
83-year-old gentleman with a past   
medical history of polycystic   
kidney disease, coronary artery   
disease, obstructive sleep apnea,   
asthma, and chronic anemia who was   
recently diagnosed with cirrhosis   
complicated by hepatocellular   
carcinoma as outlined above. In   
review, he was first seen by   
hematology/oncology on 2022   
for evaluation of persistent   
anemia with concern for a   
presentation possibly consistent   
with multiple myeloma or MGUS.   
Given his history of splenomegaly,   
an ultrasound of the liver and   
spleen was ordered. This was   
obtained on 2022 and showed   
splenomegaly, cirrhosis (not   
previously described), and a  new   
hyperechoic nodule in the right   
lobe of the liver which could be   
further evaluated by MRI . In   
2023 he underwent bone   
marrow biopsy which did not reveal   
any evidence of  smoldering   
myeloma or multiple myeloma . He   
does have a low level IgG kappa   
monoclonal gammopathy consistent   
with monoclonal gammopathy of   
unknown significance. He was also   
treated for iron deficiency   
anemia, with plans to reassess in   
1 year with medical oncology. He   
was also scheduled for a liver   
MRI, however, needed the MRI to be   
open and also required anesthesia   
and was unable to obtain until   
2023. At this time, MRI did   
show a cirrhotic liver with a 4.1   
cm liver mass compatible with HCC   
in segment 4A. His case was   
discussed at liver tumor board   
with recommendation for pursuit of   
local therapy, and he has been   
referred for discussions of SBRT.  
  
Today he presents with his   
daughter and son-in-law on speaker   
phone. He reiterates the history   
provided above. He notes that he   
needed the MRI under anesthesia   
which was obtained without issue.   
He reports that his cancer was   
discovered via US as part of his   
workup for anemia. He has not had   
significant issues recently and   
tolerated MRI well. He denies   
headache, lightheadedness,   
dizziness, syncope. Eating and   
drinking OK, swallowing without   
issue. No chest pain, worsening   
SOB, cough or hemoptysis. No   
abdominal pain, N, V. Urination   
and defecation at baseline without   
blood. Walks with a walker no   
change from prior; no focal   
deficits.  
  
In terms of his past medical   
history, he has listed diagnoses   
of GERD, hypertension, CKD, CAREN,   
MGUS, peripheral neuropathy,   
polycystic kidney disease,   
splenomegaly, and   
thrombocytopenia. No prior   
diagnoses of cancer. No prior   
history of radiation therapy. No   
diagnosis of connective tissue   
disease, ulcerative colitis, or   
Crohn's disease.  
  
Prior radiation therapy, collagen   
vascular disease, or inflammatory   
bowel disease: No  
Pregnancy status: Male  
  
Residence: [place]  
Occupation: Retired   
  
WEIGHT  
Last 5 Encounter Wt Readings:  
Date: Wt:  
2023 108.9 kg (240 lb)  
2023 109.3 kg (241 lb)  
03/10/2023 108.2 kg (238 lb 9.6   
oz)  
2023 108.2 kg (238 lb 8 oz)  
2023 111.6 kg (246 lb)  
  
IMAGING  
2023: MRI liver  
IMPRESSION:  
  
Cirrhotic liver.  
  
4.1 cm liver mass compatible with   
HCC.  
  
Left lower lobe mucoid impaction.   
Bronchial wall thickening.  
  
RESULT:  
  
Hepatic morphology and masses:   
Cirrhotic morphology.  
  
Lesion#: 1  
Location: Segment Keren; (series 15   
image 19)  
Size (maximum long-axis   
dimension): 4.1 cm, Prior size:   
N/A  
Arterial Phase Hyperenhancement   
(APHE): Nonrim APHE  
Wash-out: Yes - non-peripheral  
Enhancing Capsule: Yes  
Threshold growth (50% or more   
growth in 6 months or less): N/A  
Ancillary features favoring   
malignancy or HCC: Fat in mass   
more than  
adjacent liver  
Ancillary features favoring   
benignity: N/A  
LI-RADS Category: LR-5 (definitely   
HCC)  
OPTN Class 5 Category:5B  
  
Hepatic vasculature and   
collaterals: ...  
  
Portal venous system (splenic   
vein, main portal vein, left and   
right  
anterior and right posterior   
portal vein branches): Patent.  
Portal vein diameter: 1.6 cm.  
Celiac trunk and SMA: Patent. No   
stenosis.  
Hepatic artery: Patent. Replaced   
right hepatic artery from SMA.  
Hepatic veins: Patent.  
Collaterals:  
Spontaneous splenorenal shunt:   
Absent  
Recanalized paraumbilical vein:   
Small  
Esophageal varices: Absent.  
Mesenteric portosystemic   
collaterals: Absent  
  
Related extrahepatic findings:  
  
Spleen: 13.7 cm (craniocaudally),   
enlarged. No mass.  
  
Mesentery/Peritoneum: Trace   
ascites. No mass.  
  
Other findings:  
  
Biliary: No bile duct dilation.   
Gallbladder present with   
cholelithiasis.  
  
Pancreas: No mass or duct   
dilation.  
  
Adrenals: No mass.  
  
Kidneys: Multiple bilateral cysts.   
No solid mass. No hydronephrosis.  
  
GI: No dilated bowel or wall   
thickening along imaged segments.   
Small  
hiatal hernia  
  
Lymph nodes: No abdominal   
lymphadenopathy.  
  
Vasculature: No abdominal aortic   
aneurysm.  
  
Bones/Soft Tissues: Degenerative   
change. Focal enhancement in the   
right  
L1 transverse process, likely   
posttraumatic. No other osseous   
lesion.  
  
Lower chest: Left lower lobe   
mucoid impaction. Bronchial wall   
thickening.  
  
LABS  
Component Latest Ref Rng & Units   
2023  
WBC 3.70 - 11.00 k/uL 3.87  
RBC 4.20 - 6.00 m/uL 3.63 (L)  
Hemoglobin 13.0 - 17.0 g/dL 11.8   
(L)  
Hematocrit 39.0 - 51.0 % 36.2 (L)  
MCV 80.0 - 100.0 fL 99.7  
MCH 26.0 - 34.0 pg 32.5  
MCHC 30.5 - 36.0 g/dL 32.6  
RDW-CV 11.5 - 15.0 % 20.0 (H)  
Platelet Count 150 - 400 k/uL 66   
(L)  
MPV 9.0 - 12.7 fL 9.8  
Neut% % 79.1  
Abs Neut (ANC) 1.45 - 7.50 k/uL   
3.06  
Lymph% % 11.1  
Abs Lymph 1.00 - 4.00 k/uL 0.43   
(L)  
Mono% % 6.2  
Abs Mono <0.87 k/uL 0.24  
Eosin% % 2.8  
Abs Eosin <0.46 k/uL 0.11  
Baso% % 0.5  
Abs Baso <0.11 k/uL <0.03  
Immature Gran % % 0.3  
IMMATURE GRANS (ABS) <0.10 k/uL   
<0.03  
NRBC /100 WBC 0.0  
Absolute nRBC <0.01 k/uL <0.01  
DTYPE Auto  
Protein, Total 6.3 - 8.0 g/dL 6.0   
(L)  
Albumin 3.9 - 4.9 g/dL 3.4 (L)  
Calcium 8.5 - 10.2 mg/dL 10.1  
Bilirubin, Total 0.2 - 1.3 mg/dL   
0.4  
Alkaline Phosphatase 38 - 113 U/L   
161 (H)  
AST 14 - 40 U/L 41 (H)  
ALT 10 - 54 U/L 29  
Glucose 74 - 99 mg/dL 97  
BUN 9 - 24 mg/dL 32 (H)  
Creatinine 0.73 - 1.22 mg/dL 1.01  
Sodium 136 - 144 mmol/L 137  
Potassium 3.7 - 5.1 mmol/L 4.2  
Chloride 97 - 105 mmol/L 106 (H)  
CO2 22 - 30 mmol/L 24  
Anion Gap 9 - 18 mmol/L 7 (L)  
eGFR >=60 mL/min/1.73m 74  
AFP <11.0 ng/mL 8.5  
  
ALLERGIES  
Allergen Reactions  
Bees Anaphylaxis  
Acetaminophen Other: See Comments  
Hydrocodone GI Upset  
Lipitor [Atorvastat* Itching  
Singulair [Monteluk* Itching  
Strawberries Hives  
hives  
  
PAST MEDICAL HISTORY  
Diagnosis Date  
Adenomatous colon polyp 8/3/2011  
Adjustment disorder with depressed   
mood 3/9/2012  
Aortic valve disorders 2007  
stenosis  
Asthma exacerbation 11/10/2012  
Calculus of ureter 2005  
Esophageal reflux 10/14/2011  
Essential hypertension 10/14/2005  
Impaired renal function 4/15/2010  
Impotence of organic origin   
2006  
Intestinal polyp 8/3/2011  
Nonspecific abnormal results of   
liver function study 2006  
Obesity, unspecified  
CAREN on CPAP 10/13/2005  
Other abnormal blood chemistry  
Hyperuricemia  
Other and unspecified   
hyperlipidemia 10/14/2005  
Peripheral polyneuropathy 2/3/2015  
Polycystic kidney 2010  
Snoring  
Splenomegaly 9/10/2014  
Thrombocytopenia (HCC) 10/5/2011  
  
PAST SURGICAL HISTORY  
Procedure Laterality Date  
ARTHRP KNE CONDYLE&PLATU   
MEDIAL&LAT COMPARTMENTS Left   
2017  
BAL. ASSIST ENTEROSCOPY W/ BX   
2011  
CAPSULE ENDO SMALL BOWEL W EGD   
2011  
COLONOSCOPY FLX DX W/COLLJ SPEC   
WHEN PFRMD   
Colonoscopy  
COLONOSCOPY FLX DX W/COLLJ SPEC   
WHEN PFRMD   
Colonoscopy  
COLONOSCOPY FLX DX W/COLLJ SPEC   
WHEN PFRMD 2011  
Colonoscopy  
COLONOSCOPY FLX DX W/COLLJ SPEC   
WHEN PFRMD 2018  
Colonoscopy  
COLONOSCOPY W/BIOPSY   
SINGLE/MULTIPLE 2008  
ESOPHAGOGASTRODUODENOSCOPY   
TRANSORAL DIAGNOSTIC 2018  
EGD  
ESOPHAGOGASTRODUODENOSCOPY   
TRANSORAL DIAGNOSTIC 2019  
EGD  
ESOPHAGOGASTRODUODENOSCOPY   
TRANSORAL DIAGNOSTIC 2020  
EGD  
EXCISION PILONIDAL CYST/SINUS   
SIMPLE 1964  
HEMORRHOIDECTOMY INTERNAL RUBBER   
BAND LIGATIONS 2018 and 18  
OPTX FEM SHFT FX W/INSJ IMED IMPLT   
W/WO SCREW Left 2020  
L hip cephalo medullary nail.  
RPLCMT AORTIC VALVE OPN   
W/STENTLESS TISSUE VALVE   
10/04/2011  
25-mm Liza-Stack   
pericardial prosthesis via upper   
ministernotomy.  
RPR UMBILICAL HRNA 5 YRS/>   
REDUCIBLE 09/10/2010  
Hernia repair, umbilical >5yr  
  
FAMILY HISTORY  
Problem Relation Age of Onset  
Heart Mother  
 in 70's  
Coronary Artery Disease Father  
 in his 80's  
Emphysema Father  
Diabetes Brother  
  
Social History  
  
Tobacco Use  
Smoking status: Never  
Smokeless tobacco: Never  
Vaping Use  
Vaping Use: Never used  
Substance Use Topics  
Alcohol use: No  
Drug use: No  
  
COMPLETE REVIEW OF SYSTEMS:  
See HPI  
  
PHYSICAL EXAM:  
VS: /58   Pulse 70   Resp 18   
  Wt 108.9 kg (240 lb)   SpO2 89%   
  BMI 37.59 kg/m  
KPS: 70  
General Appearance: Alert and   
oriented. No acute distress.  
HEENT: NCAT. Sclera anicteric.   
EOMI.  
Neck: Normal ROM.  
Chest: No respiratory distress.  
Heart: Regular rate.  
Abdomen: Soft. Nondistended.  
Musculoskeletal: Baseline ROM in   
extremities.  
Neuro: Speech fluent. No focal   
deficits.  
Skin: No rashes noted.  
Hematologic: No signs of active   
bleeding.  
  
RADIOLOGY/LABORATORY DATA: see HPI  
  
ASSESSMENT AND PLAN: 83-year-old   
male with newly diagnosed   
radiographic HCC per MRI obtained   
2023 showing a 4.1 cm tumor   
in segment 4A. Systemic staging,   
labs for classification (child   
Coffey, MELD) pending. Tentatively   
intermediate stage per BCLC, cT2   
N0 MX stage II per AJCC. Discussed   
at tumor board and referred for   
consideration of SBRT. Today we   
discussed his history of   
presentation and most recent   
imaging results. We discussed that   
his blood work and systemic   
staging with further imaging   
studies is still pending. Based on   
the information at hand, we   
discussed the role of stereotactic   
body radiation therapy in the   
treatment of hepatocellular   
carcinoma and its utility for his   
situation. We discussed the risks,   
benefits, alternatives,   
procedures, mechanics and   
personnel involved in treatment.   
We also discussed the possible   
acute and late side effects   
involved. Mr. Hurt verbalized   
understanding of these issues and   
all questions were answered at   
this time. He will be scheduled   
for simulation. Further discussion   
on Renate vs main [facility] and 3 vs 5   
fractions as per Dr. Fish   
addendum below. This was discussed   
with him and he is aware.  
  
Joseph Cowart MD  
Radiation Oncology Resident  
[id number]  
  
STAFF ADDENDUM: I have read and   
reviewed the above, as well as   
discussed with Dr. Cowart,   
reviewed the medical record, and   
confirmed with the patient. I   
agree with the above.  
  
In summary Mr. Braun is a very   
pleasant 83-year-old gentleman   
with a new diagnosis of   
hepatocellular carcinoma of the   
hepatic dome. Not a surgical   
candidate given his age, cirrhosis   
and medical comorbidities.  
  
Compared and contrasted available   
nonsurgical therapies including   
radiofrequency ablation,   
embolization, and stereotactic   
body radiation. Given location in   
the dome and adjacent to the heart   
not a candidate for radiofrequency   
ablation. Patient prefers   
noninvasive approach and   
stereotactic body radiation offers   
overall excellent local control   
and favorable toxicity profile.   
Reviewed relative RBA and patient   
demonstrates understanding and   
agreement to proceed. We will   
schedule simulation. Same day CT   
chest to complete staging.   
Tentatively plan for 45 Gray in 3   
fractions.  
  
60 minutes were spent in   
consultation greater than 50% of   
which was direct counseling   
regarding treatment decisions and   
coordination of care.  
  
Marin Fish MD  
  
cc: [facility]  
[address] [place] [address]  
Phone: [phone]  
Fax: [phone]  
  
Dr. Ortega, [facility]  
  
  
Electronically signed by Marin Fish MD at 2023 10:33   
AM EDT  
documented in this encounter            [facility]  
   
                                                    2023 Miscellaneous   
Notes                                   Formatting of this note might be   
different from the original.  
Left voicemail for patient's   
daughter about liver tumor board   
results  
See [employer] message from today  
Electronically signed by Maurizio Og PA-C at 2023 4:38 PM   
EDT  
documented in this encounter            [facility]  
   
                                                    2023 Miscellaneous   
Notes                                   Formatting of this note might be   
different from the original.  
Spoke to patient's daughter Dixie,   
informed her MRI is consistent   
with hepatocellular carcinoma and   
cirrhosis. Discussed need for   
referral to liver tumor board,   
hopefully this week or next  
Will need CT chest wo cont  
She has not scheduled   
EGD/colonoscopy as previously   
recommended due to wanting to see   
results of the MRI, patient has   
also been cranky recently  
  
From cirrhosis perspective, seems   
relatively well compensated but   
suspect he has some degree of   
portal HTN with thrombocytopenia.  
MELD labs ordered, will let her   
know outcome of liver tumor board   
meeting  
  
She verbalized understanding and   
thanked me for calling  
Electronically signed by Maurizio Og PA-C at 2023 4:36 PM   
EDT  
Formatting of this note might be   
different from the original.  
Patient's daughter Dixie called   
back, tried to transfer her to you   
but went to .  
Electronically signed by Shanna Spicer at 2023 3:41 PM EDT  
Formatting of this note might be   
different from the original.  
Attempted to call patient's   
daughter Dixie, to review MRI   
results today but had to leave   
voicemail, call back number given.  
  
If she returns my call, please let   
me know as I'd like to speak with   
her directly about the MRI   
findings. Will send [employer]   
message if unable to reach her by   
this evening as I am off tomorrow   
  
  
Maurizio Og PA-C  
2023 3:14 PM  
  
Electronically signed by Maurizio Og PA-C at 2023 3:15 PM   
EDT  
documented in this encounter            [facility]  
   
                                2023 Note                 [facility]  
   
                                2023 Note                 [facility]  
   
                                2023 Note                 [facility]  
   
                                2023 Note                 [facility]  
   
                                                    2023 History of   
Present illness Narrative               Formatting of this note might be   
different from the original.  
Radiology Service Progress Note  
  
PATIENT NAME: Mitesh Braun  
MRN: [id number]  
  
DATE OF SERVICE: 2023  
TIME: 1:01 PM  
PATIENT IDENTITY VERIFICATION   
COMPLETED USING TWO (2)   
IDENTIFIERS: Name and Date of   
Birth confirmed by patient   
verbally and Name and Date of   
Birth confirmed by identification   
band.  
FALL SCREENING: Has the patient   
had 2 falls in the last year or 1   
fall with injury or currently   
using an Ambulatory Assistive   
Device (Walker, Cane, Wheelchair,   
Crutches, etc.)? Yes, Patient High   
Risk for Falls  
What interventions were put in   
place to prevent falls during this   
visit? Patient was under   
anesthetic- no walking or   
transfer.  
PATIENT GENDER DATA: Male  
PATIENT RELEVANT IMPLANT DATA   
REVIEWED: Yes  
  
RADIOLOGY DEPARTMENT: MR; Exam(s)   
Completed: Body: Liver (routine)  
  
PERIPHERAL IV DATA: Site   
assessment: Clean,Dry and Intact,   
Site disposition Left in for next   
appointment  
  
SIGNED BY: RT Justine(R)  
2023 1:01 PM  
  
  
Electronically signed by RT Justine(R) at 2023 1:03   
PM EDT  
documented in this encounter            [facility]  
   
                                                    2023 History of   
Present illness Narrative               Formatting of this note might be   
different from the original.  
Radiology Service Progress Note  
  
PATIENT NAME: Mitesh Braun  
MRN: [id number]  
  
DATE OF SERVICE: 2023  
TIME: 1:32 PM  
  
PATIENT IDENTITY VERIFICATION   
COMPLETED USING TWO (2) STANDARD   
IDENTIFIERS: Name and Date of   
Birth confirmed by patient   
verbally and Name and Date of   
Birth confirmed by identification   
band.  
PATIENT GENDER DATA: Male  
PATIENT RELEVANT IMPLANT DATA   
REVIEWED: Yes  
ALLERGIES: Reviewed and unchanged  
MEDICATIONS REVIEWED: NO  
  
IV SITE: Ambulatory: A peripheral   
IV was started in the Left   
antecubital site with a Angio   
cath: 20 gauge. and A Saline lock   
was inserted per protocol  
PERIPHERAL IV ACCESS: Discontinued  
ANXIOLYSIS/ANESTHESIA: Please see   
Outpatient Preoperative Nursing   
Care Record and Anesthesia Record   
for further details.  
  
PATIENT DISCHARGED TO: Home/Self   
Care with daughter Dixie  
SIGNED BY: Yasmine Alvarez RN  
2023 1:32 PM  
  
  
Electronically signed by Yasmine Alvarez RN at 2023 1:33 PM   
EDT  
documented in this encounter            [facility]  
   
                                        2023 Nurse Note Formatting of this  
 note is   
different from the original.  
Radiology Service Progress Note  
  
DATE OF SERVICE: 2023  
TIME: 11:20 AM  
  
PATIENT WEIGHT: 241 LBS  
PATIENT IDENTITY VERIFICATION   
COMPLETED USING TWO (2) STANDARD   
IDENTIFIERS: Name and Date of   
Birth confirmed by patient   
verbally and Name and Date of   
Birth confirmed by identification   
band.  
FALL SCREENING: Has the patient   
had 2 falls in the last year or 1   
fall with injury or currently   
using an Ambulatory Assistive   
Device (Walker, Cane, Wheelchair,   
Crutches, etc.)? No  
PATIENT GENDER DATA: Male  
ALLERGIES: Reviewed and unchanged  
CONTRAST ALLERGY: No  
  
EXAM: MRI - CONTRAST TYPE: GROUP   
II  
IV SITE: Ambulatory: A peripheral   
IV was started in the Left   
antecubital site with a Angio   
cath: 20 gauge. and A Saline lock   
was inserted per protocol  
IV SITE APPEARANCE: Clean,Dry and   
Intact  
  
SIGNATURE: Zakiya Rojas RN   
PATIENT NAME: Mitesh Braun  
DATE: 2023 MRN: [id number]  
TIME: 11:20 AM  
  
Radiology Service Progress Note  
  
PATIENT NAME: Mitesh Braun  
MRN: [id number]  
  
DATE OF SERVICE: 2023  
TIME: 11:20 AM  
  
PATIENT IDENTITY VERIFICATION   
COMPLETED USING TWO (2) STANDARD   
IDENTIFIERS: Name and Date of   
Birth confirmed by patient   
verbally and Name and Date of   
Birth confirmed by identification   
band.  
PATIENT GENDER DATA: Male  
PATIENT RELEVANT IMPLANT DATA   
REVIEWED: Yes  
ALLERGIES: Reviewed and unchanged  
MEDICATIONS REVIEWED: YES  
  
IV SITE: Ambulatory: A peripheral   
IV was started in the Left   
antecubital site with a Angio   
cath: 20 gauge. and A Saline lock   
was inserted per protocol  
PERIPHERAL IV ACCESS: Discontinued  
ANXIOLYSIS/ANESTHESIA: Please see   
Outpatient Preoperative Nursing   
Care Record and Anesthesia Record   
for further details.  
  
PATIENT DISCHARGED TO: Home/Self   
Care  
SIGNED BY: Zakiya Rojas RN  
2023 11:20 AM  
  
  
Electronically signed by Zakiya Rojas RN at 2023 11:21   
AM EDT  
documented in this encounter            [facility]  
   
                                                    2023 Miscellaneous   
Notes                                   Formatting of this note might be   
different from the original.  
Radiology Service Pre Anesthesia   
Telephone Call  
  
PATIENT NAME: Mitesh Braun  
MRN: [id number]  
  
DATE OF CALL: 2023  
TIME: 4:13 PM  
  
PATIENT TYPE: Adult patient 1. Has   
the patient ever had an MRI scan   
before? Yes, claustrophobia  
2. Does the patient have any   
implanted devices? No If yes,   
notify patient that additional   
follow up will be required.,  
3. Has the patient had a history   
and physical within this 30 day   
period? Yes.  
4. Diet instructions given. Yes.   
No solids for 8 hours prior to   
exam. Patient may take medications   
with sips of water.,  
5. Has the patient had lab work   
within the last 6 months? Yes,  
6. Is the patient diabetic? No,  
7. Is the patient taking pain   
medication? No,  
8. Parking and Check-in   
instructions given? Yes,  
9. Does the patient have a    
to take them home? Yes,  
10. Spoke with patient: No, spoke   
with Oscar, and All questions   
were addressed and answered.   
Patient's daughter verbalized   
understanding.  
  
SIGNED BY: Dotty Arias RN  
2023 4:13 PM  
  
  
Electronically signed by Dotty Arias RN at 2023 4:24 PM   
EDT  
documented in this encounter            [facility]  
  
  
  
                                          
   
                                          
   
                                          
   
                                          
   
                                          
   
                                          
   
                                          
   
                                          
   
                                          
   
                                          
   
                                          
   
                                          
   
                                          
   
                                          
   
                                          
   
                                          
   
                                          
   
                                          
   
                                          
   
                                          
   
                                          
   
                                          
   
                                          
   
                                          
   
                                          
   
                                          
   
                                          
   
                                          
   
                                          
   
                                          
   
                                          
   
                                          
   
                                          
   
                                          
  
documented as of this encounter (statuses as of 2023)  
[facility]03- History of Past illness Narrative*   
  
                          Problem      Noted Date   Diagnosed Date Resolved Date  
   
                          Stasis ulcer 2023  
   
                          Obesity, Class II, BMI 35-39.9 2021  
   
                          Medicare annual wellness visit, subsequent 2019  
   
                                                    Encounter for long-term (cur  
rent) use of   
medications         2018  
   
                                                      
  
  
Overview:Formatting of this note might be different from the original.  
Added automatically from request for surgery [id number]  
   
   
                          History of colonic polyps 2018  
   
                                                      
  
  
Overview:Formatting of this note might be different from the original.  
Added automatically from request for surgery [id number]  
   
   
                          Gastroesophageal reflux disease 2018  
   
                                                      
  
  
Overview:Formatting of this note might be different from the original.  
Added automatically from request for surgery [id number]  
   
   
                          Acute pulmonary edema 10/05/2011                10/06/  
2011  
   
                                                      
  
  
Overview:Formatting of this note might be different from the original.  
10/5/2011  
cardiogenic and noncardiogenic factors  
  
   
   
                          Atelectasis  10/05/2011                10/06/2011  
   
                          Hypotension  10/04/2011                10/06/2011  
   
                                                      
  
  
Overview:Formatting of this note might be different from the original.  
10/4/2011  
goal map 65-80  
   
   
                          Stress hyperglycemia 10/04/2011                10/06/2  
011  
   
                                                      
  
  
Overview:Formatting of this note might be different from the original.  
10/4/2011  
Perioperative insulin resistance and exacerbation of hyperglycemia.  
Control blood glucose with titration of insulin infusion  
  
10/5/2011  
adequate control  
goal FBG<180  
  
   
   
                          Post-op pain 10/04/2011                10/06/2011  
   
                          Mechanically assisted ventilation 10/04/2011            
      10/05/2011  
   
                                                      
  
  
Overview:Formatting of this note might be different from the original.  
10/4/2011  
optimize MV settings, WTE  
current setting:FiO2, 75%, peep 8  
   
   
                          Cardiac insufficiency 10/04/2011                10/05/  
2011  
   
                                                      
  
  
Overview:Formatting of this note might be different from the original.  
10/4/2011  
RV mild to moderate post pump  
goal CI>2.3  
   
   
                          Hypoxemia    10/04/2011                10/06/2011  
   
                          discharge planning 2011                10/19/201  
1  
   
                                                      
  
  
Overview:Formatting of this note might be different from the original.  
age 71,  lives in [place], [place]. No DC needs.  
/  
   
   
                          Pre-op testing 2011                10/04/2011  
   
                                                      
  
  
Overview:Formatting of this note is different from the original.  
Images from the original note were not included.  
[facility]  
PRE-OP CHECKLIST  
  
Surgeon: Deangelo Angulo M.D. Informed Consent Completed: No  
STS Score: 1.4%  
CAD: Yes - CAD on Problem List: Yes  
Is intended procedure a CABG: Yes - is a beta blocker ordered? Yes  
  
H & P completed: Yes  
  
PA/LAT: Completed CT: Completed MRI: N/A LE US: N/A  
  
Cath: Yes - reviewed: Yes Echo:Completed EKG: Completed EF %: 61  
  
PI's: N/A Carotid: Completed Mapping: N/A Dental: Completed PFT's: N/A  
  
Basename 11 0729  
WBC 7.18  
HB 13.1  
HCT 41.8  
  
INR 1.0  
CREAT 1.35  
  
UA: Normal  
HCG:N/A  
  
ABO/ABO Confirmed: Yes  
  
Blood ordered: No  
  
SA Swab: Yes - results: Pending  
  
Last Dose of Anticoagulation: vitamins  
  
Op Note: N/A  
  
Pacemaker Check: N/A  
  
Consults: GI 2001  
  
DM: No  
  
Cardiac Surgical prep: No  
  
SIGNATURE: Krystal Castellanos RN CHECKED BY:  
DATE of SERVICE: 2011  
TIME of SERVICE: 2:23 PM  
  
  
   
   
                          Anemia of chronic disease 2011  
   
                          Intestinal polyp 2011  
   
                                                      
  
  
Overview:Formatting of this note might be different from the original.  
Distal ileum ulcerated polyp.  
   
   
                                                    Nonspecific abnormal results  
 of liver   
function study      2006  
   
                          Impotence of organic origin 2006  
   
                                                    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  
9.9 (morbid   
obesity)                                                    2021  
  
documented as of this encounter (statuses as of 2023)  
[facility]- History of Past illness Narrative*   
  
                          Problem      Noted Date   Diagnosed Date Resolved Date  
   
                          Stasis ulcer 2023  
   
                          Obesity, Class II, BMI 35-39.9 2021  
   
                          Medicare annual wellness visit, subsequent 2019  
   
                                                    Encounter for long-term (cur  
rent) use of   
medications         2018  
   
                                                      
  
  
Overview:Formatting of this note might be different from the original.  
Added automatically from request for surgery [id number]  
   
   
                          History of colonic polyps 2018  
   
                                                      
  
  
Overview:Formatting of this note might be different from the original.  
Added automatically from request for surgery [id number]  
   
   
                          Gastroesophageal reflux disease 2018  
   
                                                      
  
  
Overview:Formatting of this note might be different from the original.  
Added automatically from request for surgery [id number]  
   
   
                          Acute pulmonary edema 10/05/2011                10/06/  
2011  
   
                                                      
  
  
Overview:Formatting of this note might be different from the original.  
10/5/2011  
cardiogenic and noncardiogenic factors  
  
   
   
                          Atelectasis  10/05/2011                10/06/2011  
   
                          Hypotension  10/04/2011                10/06/2011  
   
                                                      
  
  
Overview:Formatting of this note might be different from the original.  
10/4/2011  
goal map 65-80  
   
   
                          Stress hyperglycemia 10/04/2011                10/06/2  
011  
   
                                                      
  
  
Overview:Formatting of this note might be different from the original.  
10/4/2011  
Perioperative insulin resistance and exacerbation of hyperglycemia.  
Control blood glucose with titration of insulin infusion  
  
10/5/2011  
adequate control  
goal FBG<180  
  
   
   
                          Post-op pain 10/04/2011                10/06/2011  
   
                          Mechanically assisted ventilation 10/04/2011            
      10/05/2011  
   
                                                      
  
  
Overview:Formatting of this note might be different from the original.  
10/4/2011  
optimize MV settings, WTE  
current setting:FiO2, 75%, peep 8  
   
   
                          Cardiac insufficiency 10/04/2011                10/05/  
2011  
   
                                                      
  
  
Overview:Formatting of this note might be different from the original.  
10/4/2011  
RV mild to moderate post pump  
goal CI>2.3  
   
   
                          Hypoxemia    10/04/2011                10/06/2011  
   
                          discharge planning 2011                10/19/201  
1  
   
                                                      
  
  
Overview:Formatting of this note might be different from the original.  
age 71,  lives in [place], [place]. No DC needs.  
/  
   
   
                          Pre-op testing 2011                10/04/2011  
   
                                                      
  
  
Overview:Formatting of this note is different from the original.  
Images from the original note were not included.  
[facility]  
PRE-OP CHECKLIST  
  
Surgeon: Deangelo Angulo M.D. Informed Consent Completed: No  
STS Score: 1.4%  
CAD: Yes - CAD on Problem List: Yes  
Is intended procedure a CABG: Yes - is a beta blocker ordered? Yes  
  
H & P completed: Yes  
  
PA/LAT: Completed CT: Completed MRI: N/A LE US: N/A  
  
Cath: Yes - reviewed: Yes Echo:Completed EKG: Completed EF %: 61  
  
PI's: N/A Carotid: Completed Mapping: N/A Dental: Completed PFT's: N/A  
  
Basename 11 0729  
WBC 7.18  
HB 13.1  
HCT 41.8  
  
INR 1.0  
CREAT 1.35  
  
UA: Normal  
HCG:N/A  
  
ABO/ABO Confirmed: Yes  
  
Blood ordered: No  
  
SA Swab: Yes - results: Pending  
  
Last Dose of Anticoagulation: vitamins  
  
Op Note: N/A  
  
Pacemaker Check: N/A  
  
Consults: GI 2001  
  
DM: No  
  
Cardiac Surgical prep: No  
  
SIGNATURE: Krystal Castellanos RN CHECKED BY:  
DATE of SERVICE: 2011  
TIME of SERVICE: 2:23 PM  
  
  
   
   
                          Anemia of chronic disease 2011  
   
                          Intestinal polyp 2011  
   
                                                      
  
  
Overview:Formatting of this note might be different from the original.  
Distal ileum ulcerated polyp.  
   
   
                                                    Nonspecific abnormal results  
 of liver   
function study      2006  
   
                          Impotence of organic origin 2006  
   
                                                    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  
9.9 (morbid   
obesity)                                                    2021  
  
documented as of this encounter (statuses as of 2023)  
[facility]03- History of Past illness Narrative*   
  
                          Problem      Noted Date   Diagnosed Date Resolved Date  
   
                          Stasis ulcer 2023  
   
                          Obesity, Class II, BMI 35-39.9 2021  
   
                          Medicare annual wellness visit, subsequent 2019  
   
                                                    Encounter for long-term (cur  
rent) use of   
medications         2018  
   
                                                      
  
  
Overview:Formatting of this note might be different from the original.  
Added automatically from request for surgery [id number]  
   
   
                          History of colonic polyps 2018  
   
                                                      
  
  
Overview:Formatting of this note might be different from the original.  
Added automatically from request for surgery [id number]  
   
   
                          Gastroesophageal reflux disease 2018  
   
                                                      
  
  
Overview:Formatting of this note might be different from the original.  
Added automatically from request for surgery [id number]  
   
   
                          Acute pulmonary edema 10/05/2011                10/06/  
2011  
   
                                                      
  
  
Overview:Formatting of this note might be different from the original.  
10/5/2011  
cardiogenic and noncardiogenic factors  
  
   
   
                          Atelectasis  10/05/2011                10/06/2011  
   
                          Hypotension  10/04/2011                10/06/2011  
   
                                                      
  
  
Overview:Formatting of this note might be different from the original.  
10/4/2011  
goal map 65-80  
   
   
                          Stress hyperglycemia 10/04/2011                10/06/2  
011  
   
                                                      
  
  
Overview:Formatting of this note might be different from the original.  
10/4/2011  
Perioperative insulin resistance and exacerbation of hyperglycemia.  
Control blood glucose with titration of insulin infusion  
  
10/5/2011  
adequate control  
goal FBG<180  
  
   
   
                          Post-op pain 10/04/2011                10/06/2011  
   
                          Mechanically assisted ventilation 10/04/2011            
      10/05/2011  
   
                                                      
  
  
Overview:Formatting of this note might be different from the original.  
10/4/2011  
optimize MV settings, WTE  
current setting:FiO2, 75%, peep 8  
   
   
                          Cardiac insufficiency 10/04/2011                10/05/  
2011  
   
                                                      
  
  
Overview:Formatting of this note might be different from the original.  
10/4/2011  
RV mild to moderate post pump  
goal CI>2.3  
   
   
                          Hypoxemia    10/04/2011                10/06/2011  
   
                          discharge planning 2011                10/19/201  
1  
   
                                                      
  
  
Overview:Formatting of this note might be different from the original.  
age 71,  lives in [place], [place]. No DC needs.  
/  
   
   
                          Pre-op testing 2011                10/04/2011  
   
                                                      
  
  
Overview:Formatting of this note is different from the original.  
Images from the original note were not included.  
[facility]  
PRE-OP CHECKLIST  
  
Surgeon: Deangelo Angulo M.D. Informed Consent Completed: No  
STS Score: 1.4%  
CAD: Yes - CAD on Problem List: Yes  
Is intended procedure a CABG: Yes - is a beta blocker ordered? Yes  
  
H & P completed: Yes  
  
PA/LAT: Completed CT: Completed MRI: N/A LE US: N/A  
  
Cath: Yes - reviewed: Yes Echo:Completed EKG: Completed EF %: 61  
  
PI's: N/A Carotid: Completed Mapping: N/A Dental: Completed PFT's: N/A  
  
Basename 11 0729  
WBC 7.18  
HB 13.1  
HCT 41.8  
  
INR 1.0  
CREAT 1.35  
  
UA: Normal  
HCG:N/A  
  
ABO/ABO Confirmed: Yes  
  
Blood ordered: No  
  
SA Swab: Yes - results: Pending  
  
Last Dose of Anticoagulation: vitamins  
  
Op Note: N/A  
  
Pacemaker Check: N/A  
  
Consults: GI 2001  
  
DM: No  
  
Cardiac Surgical prep: No  
  
SIGNATURE: Krystal Castellanos RN CHECKED BY:  
DATE of SERVICE: 2011  
TIME of SERVICE: 2:23 PM  
  
  
   
   
                          Anemia of chronic disease 2011  
   
                          Intestinal polyp 2011  
   
                                                      
  
  
Overview:Formatting of this note might be different from the original.  
Distal ileum ulcerated polyp.  
   
   
                                                    Nonspecific abnormal results  
 of liver   
function study      2006  
   
                          Impotence of organic origin 2006  
   
                                                    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  
9.9 (morbid   
obesity)                                                    2021  
  
documented as of this encounter (statuses as of 2023)  
[facility]03- History of Past illness Narrative*   
  
                          Problem      Noted Date   Diagnosed Date Resolved Date  
   
                          Stasis ulcer 2023  
   
                          Obesity, Class II, BMI 35-39.9 2021  
   
                          Medicare annual wellness visit, subsequent 2019  
   
                                                    Encounter for long-term (cur  
rent) use of   
medications         2018  
   
                                                      
  
  
Overview:Formatting of this note might be different from the original.  
Added automatically from request for surgery [id number]  
   
   
                          History of colonic polyps 2018  
   
                                                      
  
  
Overview:Formatting of this note might be different from the original.  
Added automatically from request for surgery [id number]  
   
   
                          Gastroesophageal reflux disease 2018  
   
                                                      
  
  
Overview:Formatting of this note might be different from the original.  
Added automatically from request for surgery [id number]  
   
   
                          Acute pulmonary edema 10/05/2011                10/06/  
2011  
   
                                                      
  
  
Overview:Formatting of this note might be different from the original.  
10/5/2011  
cardiogenic and noncardiogenic factors  
  
   
   
                          Atelectasis  10/05/2011                10/06/2011  
   
                          Hypotension  10/04/2011                10/06/2011  
   
                                                      
  
  
Overview:Formatting of this note might be different from the original.  
10/4/2011  
goal map 65-80  
   
   
                          Stress hyperglycemia 10/04/2011                10/06/2  
011  
   
                                                      
  
  
Overview:Formatting of this note might be different from the original.  
10/4/2011  
Perioperative insulin resistance and exacerbation of hyperglycemia.  
Control blood glucose with titration of insulin infusion  
  
10/5/2011  
adequate control  
goal FBG<180  
  
   
   
                          Post-op pain 10/04/2011                10/06/2011  
   
                          Mechanically assisted ventilation 10/04/2011            
      10/05/2011  
   
                                                      
  
  
Overview:Formatting of this note might be different from the original.  
10/4/2011  
optimize MV settings, WTE  
current setting:FiO2, 75%, peep 8  
   
   
                          Cardiac insufficiency 10/04/2011                10/05/  
2011  
   
                                                      
  
  
Overview:Formatting of this note might be different from the original.  
10/4/2011  
RV mild to moderate post pump  
goal CI>2.3  
   
   
                          Hypoxemia    10/04/2011                10/06/2011  
   
                          discharge planning 2011                10/19/201  
1  
   
                                                      
  
  
Overview:Formatting of this note might be different from the original.  
age 71,  lives in [place], [place]. No DC needs.  
/  
   
   
                          Pre-op testing 2011                10/04/2011  
   
                                                      
  
  
Overview:Formatting of this note is different from the original.  
Images from the original note were not included.  
[facility]  
PRE-OP CHECKLIST  
  
Surgeon: Deangelo Angulo M.D. Informed Consent Completed: No  
STS Score: 1.4%  
CAD: Yes - CAD on Problem List: Yes  
Is intended procedure a CABG: Yes - is a beta blocker ordered? Yes  
  
H & P completed: Yes  
  
PA/LAT: Completed CT: Completed MRI: N/A LE US: N/A  
  
Cath: Yes - reviewed: Yes Echo:Completed EKG: Completed EF %: 61  
  
PI's: N/A Carotid: Completed Mapping: N/A Dental: Completed PFT's: N/A  
  
Basename 11 0729  
WBC 7.18  
HB 13.1  
HCT 41.8  
  
INR 1.0  
CREAT 1.35  
  
UA: Normal  
HCG:N/A  
  
ABO/ABO Confirmed: Yes  
  
Blood ordered: No  
  
SA Swab: Yes - results: Pending  
  
Last Dose of Anticoagulation: vitamins  
  
Op Note: N/A  
  
Pacemaker Check: N/A  
  
Consults: GI 2001  
  
DM: No  
  
Cardiac Surgical prep: No  
  
SIGNATURE: Krystal Castellanos RN CHECKED BY:  
DATE of SERVICE: 2011  
TIME of SERVICE: 2:23 PM  
  
  
   
   
                          Anemia of chronic disease 2011  
   
                          Intestinal polyp 2011  
   
                                                      
  
  
Overview:Formatting of this note might be different from the original.  
Distal ileum ulcerated polyp.  
   
   
                                                    Nonspecific abnormal results  
 of liver   
function study      2006  
   
                          Impotence of organic origin 2006  
   
                                                    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  
9.9 (morbid   
obesity)                                                    2021  
  
documented as of this encounter (statuses as of 2023)  
[facility]03- History of Past illness Narrative*   
  
                          Problem      Noted Date   Diagnosed Date Resolved Date  
   
                          Stasis ulcer 2023  
   
                          Obesity, Class II, BMI 35-39.9 2021  
   
                          Medicare annual wellness visit, subsequent 2019  
   
                                                    Encounter for long-term (cur  
rent) use of   
medications         2018  
   
                                                      
  
  
Overview:Formatting of this note might be different from the original.  
Added automatically from request for surgery [id number]  
   
   
                          History of colonic polyps 2018  
   
                                                      
  
  
Overview:Formatting of this note might be different from the original.  
Added automatically from request for surgery [id number]  
   
   
                          Gastroesophageal reflux disease 2018  
   
                                                      
  
  
Overview:Formatting of this note might be different from the original.  
Added automatically from request for surgery [id number]  
   
   
                          Acute pulmonary edema 10/05/2011                10/06/  
2011  
   
                                                      
  
  
Overview:Formatting of this note might be different from the original.  
10/5/2011  
cardiogenic and noncardiogenic factors  
  
   
   
                          Atelectasis  10/05/2011                10/06/2011  
   
                          Hypotension  10/04/2011                10/06/2011  
   
                                                      
  
  
Overview:Formatting of this note might be different from the original.  
10/4/2011  
goal map 65-80  
   
   
                          Stress hyperglycemia 10/04/2011                10/06/2  
011  
   
                                                      
  
  
Overview:Formatting of this note might be different from the original.  
10/4/2011  
Perioperative insulin resistance and exacerbation of hyperglycemia.  
Control blood glucose with titration of insulin infusion  
  
10/5/2011  
adequate control  
goal FBG<180  
  
   
   
                          Post-op pain 10/04/2011                10/06/2011  
   
                          Mechanically assisted ventilation 10/04/2011            
      10/05/2011  
   
                                                      
  
  
Overview:Formatting of this note might be different from the original.  
10/4/2011  
optimize MV settings, WTE  
current setting:FiO2, 75%, peep 8  
   
   
                          Cardiac insufficiency 10/04/2011                10/05/  
2011  
   
                                                      
  
  
Overview:Formatting of this note might be different from the original.  
10/4/2011  
RV mild to moderate post pump  
goal CI>2.3  
   
   
                          Hypoxemia    10/04/2011                10/06/2011  
   
                          discharge planning 2011                10/19/201  
1  
   
                                                      
  
  
Overview:Formatting of this note might be different from the original.  
age 71,  lives in [place], [place]. No DC needs.  
/  
   
   
                          Pre-op testing 2011                10/04/2011  
   
                                                      
  
  
Overview:Formatting of this note is different from the original.  
Images from the original note were not included.  
[facility]  
PRE-OP CHECKLIST  
  
Surgeon: Deangelo Angulo M.D. Informed Consent Completed: No  
STS Score: 1.4%  
CAD: Yes - CAD on Problem List: Yes  
Is intended procedure a CABG: Yes - is a beta blocker ordered? Yes  
  
H & P completed: Yes  
  
PA/LAT: Completed CT: Completed MRI: N/A LE US: N/A  
  
Cath: Yes - reviewed: Yes Echo:Completed EKG: Completed EF %: 61  
  
PI's: N/A Carotid: Completed Mapping: N/A Dental: Completed PFT's: N/A  
  
Basename 11 0729  
WBC 7.18  
HB 13.1  
HCT 41.8  
  
INR 1.0  
CREAT 1.35  
  
UA: Normal  
HCG:N/A  
  
ABO/ABO Confirmed: Yes  
  
Blood ordered: No  
  
SA Swab: Yes - results: Pending  
  
Last Dose of Anticoagulation: vitamins  
  
Op Note: N/A  
  
Pacemaker Check: N/A  
  
Consults: GI 2001  
  
DM: No  
  
Cardiac Surgical prep: No  
  
SIGNATURE: Krystal Castellanos RN CHECKED BY:  
DATE of SERVICE: 2011  
TIME of SERVICE: 2:23 PM  
  
  
   
   
                          Anemia of chronic disease 2011  
   
                          Intestinal polyp 2011  
   
                                                      
  
  
Overview:Formatting of this note might be different from the original.  
Distal ileum ulcerated polyp.  
   
   
                                                    Nonspecific abnormal results  
 of liver   
function study      2006  
   
                          Impotence of organic origin 2006  
   
                                                    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  
9.9 (morbid   
obesity)                                                    2021  
  
documented as of this encounter (statuses as of 2023)  
[facility]03- History of Past illness Narrative*   
  
                          Problem      Noted Date   Diagnosed Date Resolved Date  
   
                          Stasis ulcer 2023  
   
                          Obesity, Class II, BMI 35-39.9 2021  
   
                          Medicare annual wellness visit, subsequent 2019  
   
                                                    Encounter for long-term (cur  
rent) use of   
medications         2018  
   
                                                      
  
  
Overview:Formatting of this note might be different from the original.  
Added automatically from request for surgery [id number]  
   
   
                          History of colonic polyps 2018  
   
                                                      
  
  
Overview:Formatting of this note might be different from the original.  
Added automatically from request for surgery [id number]  
   
   
                          Gastroesophageal reflux disease 2018  
   
                                                      
  
  
Overview:Formatting of this note might be different from the original.  
Added automatically from request for surgery [id number]  
   
   
                          Acute pulmonary edema 10/05/2011                10/06/  
2011  
   
                                                      
  
  
Overview:Formatting of this note might be different from the original.  
10/5/2011  
cardiogenic and noncardiogenic factors  
  
   
   
                          Atelectasis  10/05/2011                10/06/2011  
   
                          Hypotension  10/04/2011                10/06/2011  
   
                                                      
  
  
Overview:Formatting of this note might be different from the original.  
10/4/2011  
goal map 65-80  
   
   
                          Stress hyperglycemia 10/04/2011                10/06/2  
011  
   
                                                      
  
  
Overview:Formatting of this note might be different from the original.  
10/4/2011  
Perioperative insulin resistance and exacerbation of hyperglycemia.  
Control blood glucose with titration of insulin infusion  
  
10/5/2011  
adequate control  
goal FBG<180  
  
   
   
                          Post-op pain 10/04/2011                10/06/2011  
   
                          Mechanically assisted ventilation 10/04/2011            
      10/05/2011  
   
                                                      
  
  
Overview:Formatting of this note might be different from the original.  
10/4/2011  
optimize MV settings, WTE  
current setting:FiO2, 75%, peep 8  
   
   
                          Cardiac insufficiency 10/04/2011                10/05/  
2011  
   
                                                      
  
  
Overview:Formatting of this note might be different from the original.  
10/4/2011  
RV mild to moderate post pump  
goal CI>2.3  
   
   
                          Hypoxemia    10/04/2011                10/06/2011  
   
                          discharge planning 2011                10/201  
1  
   
                                                      
  
  
Overview:Formatting of this note might be different from the original.  
age 71,  lives in [place], [place]. No DC needs.  
/  
   
   
                          Pre-op testing 2011                10/04/2011  
   
                                                      
  
  
Overview:Formatting of this note is different from the original.  
Images from the original note were not included.  
[facility]  
PRE-OP CHECKLIST  
  
Surgeon: Deangelo Angulo M.D. Informed Consent Completed: No  
STS Score: 1.4%  
CAD: Yes - CAD on Problem List: Yes  
Is intended procedure a CABG: Yes - is a beta blocker ordered? Yes  
  
H & P completed: Yes  
  
PA/LAT: Completed CT: Completed MRI: N/A LE US: N/A  
  
Cath: Yes - reviewed: Yes Echo:Completed EKG: Completed EF %: 61  
  
PI's: N/A Carotid: Completed Mapping: N/A Dental: Completed PFT's: N/A  
  
Basename 11 0729  
WBC 7.18  
HB 13.1  
HCT 41.8  
  
INR 1.0  
CREAT 1.35  
  
UA: Normal  
HCG:N/A  
  
ABO/ABO Confirmed: Yes  
  
Blood ordered: No  
  
SA Swab: Yes - results: Pending  
  
Last Dose of Anticoagulation: vitamins  
  
Op Note: N/A  
  
Pacemaker Check: N/A  
  
Consults: GI 2001  
  
DM: No  
  
Cardiac Surgical prep: No  
  
SIGNATURE: Krystal Castellanos RN CHECKED BY:  
DATE of SERVICE: 2011  
TIME of SERVICE: 2:23 PM  
  
  
   
   
                          Anemia of chronic disease 2011  
   
                          Intestinal polyp 2011  
   
                                                      
  
  
Overview:Formatting of this note might be different from the original.  
Distal ileum ulcerated polyp.  
   
   
                                                    Nonspecific abnormal results  
 of liver   
function study      2006  
   
                          Impotence of organic origin 2006  
   
                                                    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  
9.9 (morbid   
obesity)                                                    2021  
  
documented as of this encounter (statuses as of 2023)  
[facility]03- History of Past illness Narrative*   
  
                          Problem      Noted Date   Diagnosed Date Resolved Date  
   
                          Stasis ulcer 2023  
   
                          Obesity, Class II, BMI 35-39.9 2021  
   
                          Medicare annual wellness visit, subsequent 2019  
   
                                                    Encounter for long-term (cur  
rent) use of   
medications         2018  
   
                                                      
  
  
Overview:Formatting of this note might be different from the original.  
Added automatically from request for surgery [id number]  
   
   
                          History of colonic polyps 2018  
   
                                                      
  
  
Overview:Formatting of this note might be different from the original.  
Added automatically from request for surgery [id number]  
   
   
                          Gastroesophageal reflux disease 2018  
   
                                                      
  
  
Overview:Formatting of this note might be different from the original.  
Added automatically from request for surgery [id number]  
   
   
                          Acute pulmonary edema 10/05/2011                10/06/  
2011  
   
                                                      
  
  
Overview:Formatting of this note might be different from the original.  
10/5/2011  
cardiogenic and noncardiogenic factors  
  
   
   
                          Atelectasis  10/05/2011                10/06/2011  
   
                          Hypotension  10/04/2011                10/06/2011  
   
                                                      
  
  
Overview:Formatting of this note might be different from the original.  
10/4/2011  
goal map 65-80  
   
   
                          Stress hyperglycemia 10/04/2011                10/06/2  
011  
   
                                                      
  
  
Overview:Formatting of this note might be different from the original.  
10/4/2011  
Perioperative insulin resistance and exacerbation of hyperglycemia.  
Control blood glucose with titration of insulin infusion  
  
10/5/2011  
adequate control  
goal FBG<180  
  
   
   
                          Post-op pain 10/04/2011                10/06/2011  
   
                          Mechanically assisted ventilation 10/04/2011            
      10/05/2011  
   
                                                      
  
  
Overview:Formatting of this note might be different from the original.  
10/4/2011  
optimize MV settings, WTE  
current setting:FiO2, 75%, peep 8  
   
   
                          Cardiac insufficiency 10/04/2011                10/05/  
2011  
   
                                                      
  
  
Overview:Formatting of this note might be different from the original.  
10/4/2011  
RV mild to moderate post pump  
goal CI>2.3  
   
   
                          Hypoxemia    10/04/2011                10/06/2011  
   
                          discharge planning 2011                10/19/201  
1  
   
                                                      
  
  
Overview:Formatting of this note might be different from the original.  
age 71,  lives in [place], [place]. No DC needs.  
/  
   
   
                          Pre-op testing 2011                10/04/2011  
   
                                                      
  
  
Overview:Formatting of this note is different from the original.  
Images from the original note were not included.  
[facility]  
PRE-OP CHECKLIST  
  
Surgeon: Deangelo Angulo M.D. Informed Consent Completed: No  
STS Score: 1.4%  
CAD: Yes - CAD on Problem List: Yes  
Is intended procedure a CABG: Yes - is a beta blocker ordered? Yes  
  
H & P completed: Yes  
  
PA/LAT: Completed CT: Completed MRI: N/A LE US: N/A  
  
Cath: Yes - reviewed: Yes Echo:Completed EKG: Completed EF %: 61  
  
PI's: N/A Carotid: Completed Mapping: N/A Dental: Completed PFT's: N/A  
  
Basename 11 0729  
WBC 7.18  
HB 13.1  
HCT 41.8  
  
INR 1.0  
CREAT 1.35  
  
UA: Normal  
HCG:N/A  
  
ABO/ABO Confirmed: Yes  
  
Blood ordered: No  
  
SA Swab: Yes - results: Pending  
  
Last Dose of Anticoagulation: vitamins  
  
Op Note: N/A  
  
Pacemaker Check: N/A  
  
Consults: GI 2001  
  
DM: No  
  
Cardiac Surgical prep: No  
  
SIGNATURE: Krystal Castellanos RN CHECKED BY:  
DATE of SERVICE: 2011  
TIME of SERVICE: 2:23 PM  
  
  
   
   
                          Anemia of chronic disease 2011  
   
                          Intestinal polyp 2011  
   
                                                      
  
  
Overview:Formatting of this note might be different from the original.  
Distal ileum ulcerated polyp.  
   
   
                                                    Nonspecific abnormal results  
 of liver   
function study      2006  
   
                          Impotence of organic origin 2006  
   
                                                    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  
9.9 (morbid   
obesity)                                                    2021  
  
documented as of this encounter (statuses as of 2023)  
[facility]03- History of Past illness Narrative*   
  
                          Problem      Noted Date   Diagnosed Date Resolved Date  
   
                          Stasis ulcer 2023  
   
                          Obesity, Class II, BMI 35-39.9 2021  
   
                          Medicare annual wellness visit, subsequent 2019  
   
                                                    Encounter for long-term (cur  
rent) use of   
medications         2018  
   
                                                      
  
  
Overview:Formatting of this note might be different from the original.  
Added automatically from request for surgery [id number]  
   
   
                          History of colonic polyps 2018  
   
                                                      
  
  
Overview:Formatting of this note might be different from the original.  
Added automatically from request for surgery [id number]  
   
   
                          Gastroesophageal reflux disease 2018  
   
                                                      
  
  
Overview:Formatting of this note might be different from the original.  
Added automatically from request for surgery [id number]  
   
   
                          Acute pulmonary edema 10/05/2011                10/06/  
2011  
   
                                                      
  
  
Overview:Formatting of this note might be different from the original.  
10/5/2011  
cardiogenic and noncardiogenic factors  
  
   
   
                          Atelectasis  10/05/2011                10/06/2011  
   
                          Hypotension  10/04/2011                10/06/2011  
   
                                                      
  
  
Overview:Formatting of this note might be different from the original.  
10/4/2011  
goal map 65-80  
   
   
                          Stress hyperglycemia 10/04/2011                10/06/2  
011  
   
                                                      
  
  
Overview:Formatting of this note might be different from the original.  
10/4/2011  
Perioperative insulin resistance and exacerbation of hyperglycemia.  
Control blood glucose with titration of insulin infusion  
  
10/5/2011  
adequate control  
goal FBG<180  
  
   
   
                          Post-op pain 10/04/2011                10/06/2011  
   
                          Mechanically assisted ventilation 10/04/2011            
      10/05/2011  
   
                                                      
  
  
Overview:Formatting of this note might be different from the original.  
10/4/2011  
optimize MV settings, WTE  
current setting:FiO2, 75%, peep 8  
   
   
                          Cardiac insufficiency 10/04/2011                10/05/  
2011  
   
                                                      
  
  
Overview:Formatting of this note might be different from the original.  
10/4/2011  
RV mild to moderate post pump  
goal CI>2.3  
   
   
                          Hypoxemia    10/04/2011                10/06/2011  
   
                          discharge planning 2011                10/19/201  
1  
   
                                                      
  
  
Overview:Formatting of this note might be different from the original.  
age 71,  lives in [place], [place]. No DC needs.  
/  
   
   
                          Pre-op testing 2011                10/04/2011  
   
                                                      
  
  
Overview:Formatting of this note is different from the original.  
Images from the original note were not included.  
[facility]  
PRE-OP CHECKLIST  
  
Surgeon: Deangelo Angulo M.D. Informed Consent Completed: No  
STS Score: 1.4%  
CAD: Yes - CAD on Problem List: Yes  
Is intended procedure a CABG: Yes - is a beta blocker ordered? Yes  
  
H & P completed: Yes  
  
PA/LAT: Completed CT: Completed MRI: N/A LE US: N/A  
  
Cath: Yes - reviewed: Yes Echo:Completed EKG: Completed EF %: 61  
  
PI's: N/A Carotid: Completed Mapping: N/A Dental: Completed PFT's: N/A  
  
Basename 11 0729  
WBC 7.18  
HB 13.1  
HCT 41.8  
  
INR 1.0  
CREAT 1.35  
  
UA: Normal  
HCG:N/A  
  
ABO/ABO Confirmed: Yes  
  
Blood ordered: No  
  
SA Swab: Yes - results: Pending  
  
Last Dose of Anticoagulation: vitamins  
  
Op Note: N/A  
  
Pacemaker Check: N/A  
  
Consults: GI 2001  
  
DM: No  
  
Cardiac Surgical prep: No  
  
SIGNATURE: Krystal Castellanos RN CHECKED BY:  
DATE of SERVICE: 2011  
TIME of SERVICE: 2:23 PM  
  
  
   
   
                          Anemia of chronic disease 2011  
   
                          Intestinal polyp 2011  
   
                                                      
  
  
Overview:Formatting of this note might be different from the original.  
Distal ileum ulcerated polyp.  
   
   
                                                    Nonspecific abnormal results  
 of liver   
function study      2006  
   
                          Impotence of organic origin 2006  
   
                                                    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  
9.9 (morbid   
obesity)                                                    2021  
  
documented as of this encounter (statuses as of 2023)  
[facility]03- History of Past illness Narrative*   
  
                          Problem      Noted Date   Diagnosed Date Resolved Date  
   
                          Stasis ulcer 2023  
   
                          Obesity, Class II, BMI 35-39.9 2021  
   
                          Medicare annual wellness visit, subsequent 2019  
   
                                                    Encounter for long-term (cur  
rent) use of   
medications         2018  
   
                                                      
  
  
Overview:Formatting of this note might be different from the original.  
Added automatically from request for surgery [id number]  
   
   
                          History of colonic polyps 2018  
   
                                                      
  
  
Overview:Formatting of this note might be different from the original.  
Added automatically from request for surgery [id number]  
   
   
                          Gastroesophageal reflux disease 2018  
   
                                                      
  
  
Overview:Formatting of this note might be different from the original.  
Added automatically from request for surgery [id number]  
   
   
                          Acute pulmonary edema 10/05/2011                10/06/  
2011  
   
                                                      
  
  
Overview:Formatting of this note might be different from the original.  
10/5/2011  
cardiogenic and noncardiogenic factors  
  
   
   
                          Atelectasis  10/05/2011                10/06/2011  
   
                          Hypotension  10/04/2011                10/06/2011  
   
                                                      
  
  
Overview:Formatting of this note might be different from the original.  
10/4/2011  
goal map 65-80  
   
   
                          Stress hyperglycemia 10/04/2011                10/06/2  
011  
   
                                                      
  
  
Overview:Formatting of this note might be different from the original.  
10/4/2011  
Perioperative insulin resistance and exacerbation of hyperglycemia.  
Control blood glucose with titration of insulin infusion  
  
10/5/2011  
adequate control  
goal FBG<180  
  
   
   
                          Post-op pain 10/04/2011                10/06/2011  
   
                          Mechanically assisted ventilation 10/04/2011            
      10/05/2011  
   
                                                      
  
  
Overview:Formatting of this note might be different from the original.  
10/4/2011  
optimize MV settings, WTE  
current setting:FiO2, 75%, peep 8  
   
   
                          Cardiac insufficiency 10/04/2011                10/05/  
2011  
   
                                                      
  
  
Overview:Formatting of this note might be different from the original.  
10/4/2011  
RV mild to moderate post pump  
goal CI>2.3  
   
   
                          Hypoxemia    10/04/2011                10/06/2011  
   
                          discharge planning 2011                10/19/201  
1  
   
                                                      
  
  
Overview:Formatting of this note might be different from the original.  
age 71,  lives in [place], [place]. No DC needs.  
/  
   
   
                          Pre-op testing 2011                10/04/2011  
   
                                                      
  
  
Overview:Formatting of this note is different from the original.  
Images from the original note were not included.  
[facility]  
PRE-OP CHECKLIST  
  
Surgeon: Deangelo Angulo M.D. Informed Consent Completed: No  
STS Score: 1.4%  
CAD: Yes - CAD on Problem List: Yes  
Is intended procedure a CABG: Yes - is a beta blocker ordered? Yes  
  
H & P completed: Yes  
  
PA/LAT: Completed CT: Completed MRI: N/A LE US: N/A  
  
Cath: Yes - reviewed: Yes Echo:Completed EKG: Completed EF %: 61  
  
PI's: N/A Carotid: Completed Mapping: N/A Dental: Completed PFT's: N/A  
  
Basename 11 0729  
WBC 7.18  
HB 13.1  
HCT 41.8  
  
INR 1.0  
CREAT 1.35  
  
UA: Normal  
HCG:N/A  
  
ABO/ABO Confirmed: Yes  
  
Blood ordered: No  
  
SA Swab: Yes - results: Pending  
  
Last Dose of Anticoagulation: vitamins  
  
Op Note: N/A  
  
Pacemaker Check: N/A  
  
Consults: GI 2001  
  
DM: No  
  
Cardiac Surgical prep: No  
  
SIGNATURE: Krystal Castellanos RN CHECKED BY:  
DATE of SERVICE: 2011  
TIME of SERVICE: 2:23 PM  
  
  
   
   
                          Anemia of chronic disease 2011  
   
                          Intestinal polyp 2011  
   
                                                      
  
  
Overview:Formatting of this note might be different from the original.  
Distal ileum ulcerated polyp.  
   
   
                                                    Nonspecific abnormal results  
 of liver   
function study      2006  
   
                          Impotence of organic origin 2006  
   
                                                    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  
9.9 (morbid   
obesity)                                                    2021  
  
documented as of this encounter (statuses as of 2023)  
[facility]- History of Past illness Narrative*   
  
                          Problem      Noted Date   Diagnosed Date Resolved Date  
   
                          Stasis ulcer 2023  
   
                          Obesity, Class II, BMI 35-39.9 2021  
   
                          Medicare annual wellness visit, subsequent 2019  
   
                                                    Encounter for long-term (cur  
rent) use of   
medications         2018  
   
                                                      
  
  
Overview:Formatting of this note might be different from the original.  
Added automatically from request for surgery [id number]  
   
   
                          History of colonic polyps 2018  
   
                                                      
  
  
Overview:Formatting of this note might be different from the original.  
Added automatically from request for surgery [id number]  
   
   
                          Gastroesophageal reflux disease 2018  
   
                                                      
  
  
Overview:Formatting of this note might be different from the original.  
Added automatically from request for surgery [id number]  
   
   
                          Acute pulmonary edema 10/05/2011                10/06/  
2011  
   
                                                      
  
  
Overview:Formatting of this note might be different from the original.  
10/5/2011  
cardiogenic and noncardiogenic factors  
  
   
   
                          Atelectasis  10/05/2011                10/06/2011  
   
                          Hypotension  10/04/2011                10/06/2011  
   
                                                      
  
  
Overview:Formatting of this note might be different from the original.  
10/4/2011  
goal map 65-80  
   
   
                          Stress hyperglycemia 10/04/2011                10/06/2  
011  
   
                                                      
  
  
Overview:Formatting of this note might be different from the original.  
10/4/2011  
Perioperative insulin resistance and exacerbation of hyperglycemia.  
Control blood glucose with titration of insulin infusion  
  
10/5/2011  
adequate control  
goal FBG<180  
  
   
   
                          Post-op pain 10/04/2011                10/06/2011  
   
                          Mechanically assisted ventilation 10/04/2011            
      10/05/2011  
   
                                                      
  
  
Overview:Formatting of this note might be different from the original.  
10/4/2011  
optimize MV settings, WTE  
current setting:FiO2, 75%, peep 8  
   
   
                          Cardiac insufficiency 10/04/2011                10/05/  
2011  
   
                                                      
  
  
Overview:Formatting of this note might be different from the original.  
10/4/2011  
RV mild to moderate post pump  
goal CI>2.3  
   
   
                          Hypoxemia    10/04/2011                10/06/2011  
   
                          discharge planning 2011                10/19/201  
1  
   
                                                      
  
  
Overview:Formatting of this note might be different from the original.  
age 71,  lives in [place], [place]. No DC needs.  
/  
   
   
                          Pre-op testing 2011                10/04/2011  
   
                                                      
  
  
Overview:Formatting of this note is different from the original.  
Images from the original note were not included.  
[facility]  
PRE-OP CHECKLIST  
  
Surgeon: Deangelo Angulo M.D. Informed Consent Completed: No  
STS Score: 1.4%  
CAD: Yes - CAD on Problem List: Yes  
Is intended procedure a CABG: Yes - is a beta blocker ordered? Yes  
  
H & P completed: Yes  
  
PA/LAT: Completed CT: Completed MRI: N/A LE US: N/A  
  
Cath: Yes - reviewed: Yes Echo:Completed EKG: Completed EF %: 61  
  
PI's: N/A Carotid: Completed Mapping: N/A Dental: Completed PFT's: N/A  
  
Basename 11 0729  
WBC 7.18  
HB 13.1  
HCT 41.8  
  
INR 1.0  
CREAT 1.35  
  
UA: Normal  
HCG:N/A  
  
ABO/ABO Confirmed: Yes  
  
Blood ordered: No  
  
SA Swab: Yes - results: Pending  
  
Last Dose of Anticoagulation: vitamins  
  
Op Note: N/A  
  
Pacemaker Check: N/A  
  
Consults: GI 2001  
  
DM: No  
  
Cardiac Surgical prep: No  
  
SIGNATURE: Krystal Castellanos RN CHECKED BY:  
DATE of SERVICE: 2011  
TIME of SERVICE: 2:23 PM  
  
  
   
   
                          Anemia of chronic disease 2011  
   
                          Intestinal polyp 2011  
   
                                                      
  
  
Overview:Formatting of this note might be different from the original.  
Distal ileum ulcerated polyp.  
   
   
                                                    Nonspecific abnormal results  
 of liver   
function study      2006  
   
                          Impotence of organic origin 2006  
   
                                                    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  
9.9 (morbid   
obesity)                                                    2021  
  
documented as of this encounter (statuses as of 10/19/2023)  
[facility]03- History of Past illness Narrative*   
  
                          Problem      Noted Date   Diagnosed Date Resolved Date  
   
                          Stasis ulcer 2023  
   
                          Obesity, Class II, BMI 35-39.9 2021  
   
                          Medicare annual wellness visit, subsequent 2019  
   
                                                    Encounter for long-term (cur  
rent) use of   
medications         2018  
   
                                                      
  
  
Overview:Formatting of this note might be different from the original.  
Added automatically from request for surgery [id number]  
   
   
                          History of colonic polyps 2018  
   
                                                      
  
  
Overview:Formatting of this note might be different from the original.  
Added automatically from request for surgery [id number]  
   
   
                          Gastroesophageal reflux disease 2018  
   
                                                      
  
  
Overview:Formatting of this note might be different from the original.  
Added automatically from request for surgery [id number]  
   
   
                          Acute pulmonary edema 10/05/2011                10/06/  
2011  
   
                                                      
  
  
Overview:Formatting of this note might be different from the original.  
10/5/2011  
cardiogenic and noncardiogenic factors  
  
   
   
                          Atelectasis  10/05/2011                10/06/2011  
   
                          Hypotension  10/04/2011                10/06/2011  
   
                                                      
  
  
Overview:Formatting of this note might be different from the original.  
10/4/2011  
goal map 65-80  
   
   
                          Stress hyperglycemia 10/04/2011                10/06/2  
011  
   
                                                      
  
  
Overview:Formatting of this note might be different from the original.  
10/4/2011  
Perioperative insulin resistance and exacerbation of hyperglycemia.  
Control blood glucose with titration of insulin infusion  
  
10/5/2011  
adequate control  
goal FBG<180  
  
   
   
                          Post-op pain 10/04/2011                10/06/2011  
   
                          Mechanically assisted ventilation 10/04/2011            
      10/05/2011  
   
                                                      
  
  
Overview:Formatting of this note might be different from the original.  
10/4/2011  
optimize MV settings, WTE  
current setting:FiO2, 75%, peep 8  
   
   
                          Cardiac insufficiency 10/04/2011                10/05/  
2011  
   
                                                      
  
  
Overview:Formatting of this note might be different from the original.  
10/4/2011  
RV mild to moderate post pump  
goal CI>2.3  
   
   
                          Hypoxemia    10/04/2011                10/06/2011  
   
                          discharge planning 2011                10/19/201  
1  
   
                                                      
  
  
Overview:Formatting of this note might be different from the original.  
age 71,  lives in [place], [place]. No DC needs.  
/  
   
   
                          Pre-op testing 2011                10/04/2011  
   
                                                      
  
  
Overview:Formatting of this note is different from the original.  
Images from the original note were not included.  
[facility]  
PRE-OP CHECKLIST  
  
Surgeon: Deangelo Angulo M.D. Informed Consent Completed: No  
STS Score: 1.4%  
CAD: Yes - CAD on Problem List: Yes  
Is intended procedure a CABG: Yes - is a beta blocker ordered? Yes  
  
H & P completed: Yes  
  
PA/LAT: Completed CT: Completed MRI: N/A LE US: N/A  
  
Cath: Yes - reviewed: Yes Echo:Completed EKG: Completed EF %: 61  
  
PI's: N/A Carotid: Completed Mapping: N/A Dental: Completed PFT's: N/A  
  
Basename 11 0729  
WBC 7.18  
HB 13.1  
HCT 41.8  
  
INR 1.0  
CREAT 1.35  
  
UA: Normal  
HCG:N/A  
  
ABO/ABO Confirmed: Yes  
  
Blood ordered: No  
  
SA Swab: Yes - results: Pending  
  
Last Dose of Anticoagulation: vitamins  
  
Op Note: N/A  
  
Pacemaker Check: N/A  
  
Consults: GI 2001  
  
DM: No  
  
Cardiac Surgical prep: No  
  
SIGNATURE: Krystal Castellanos RN CHECKED BY:  
DATE of SERVICE: 2011  
TIME of SERVICE: 2:23 PM  
  
  
   
   
                          Anemia of chronic disease 2011  
   
                          Intestinal polyp 2011  
   
                                                      
  
  
Overview:Formatting of this note might be different from the original.  
Distal ileum ulcerated polyp.  
   
   
                                                    Nonspecific abnormal results  
 of liver   
function study      2006  
   
                          Impotence of organic origin 2006  
   
                                                    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  
9.9 (morbid   
obesity)                                                    2021  
  
documented as of this encounter (statuses as of 2023)  
[facility]- History of Past illness Narrative*   
  
                          Problem      Noted Date   Diagnosed Date Resolved Date  
   
                          Stasis ulcer 2023  
   
                          Obesity, Class II, BMI 35-39.9 2021  
   
                          Medicare annual wellness visit, subsequent 2019  
   
                                                    Encounter for long-term (cur  
rent) use of   
medications         2018  
   
                                                      
  
  
Overview:Formatting of this note might be different from the original.  
Added automatically from request for surgery [id number]  
   
   
                          History of colonic polyps 2018  
   
                                                      
  
  
Overview:Formatting of this note might be different from the original.  
Added automatically from request for surgery [id number]  
   
   
                          Gastroesophageal reflux disease 2018  
   
                                                      
  
  
Overview:Formatting of this note might be different from the original.  
Added automatically from request for surgery [id number]  
   
   
                          Acute pulmonary edema 10/05/2011                10/06/  
2011  
   
                                                      
  
  
Overview:Formatting of this note might be different from the original.  
10/5/2011  
cardiogenic and noncardiogenic factors  
  
   
   
                          Atelectasis  10/05/2011                10/06/2011  
   
                          Hypotension  10/04/2011                10/06/2011  
   
                                                      
  
  
Overview:Formatting of this note might be different from the original.  
10/4/2011  
goal map 65-80  
   
   
                          Stress hyperglycemia 10/04/2011                10/06/2  
011  
   
                                                      
  
  
Overview:Formatting of this note might be different from the original.  
10/4/2011  
Perioperative insulin resistance and exacerbation of hyperglycemia.  
Control blood glucose with titration of insulin infusion  
  
10/5/2011  
adequate control  
goal FBG<180  
  
   
   
                          Post-op pain 10/04/2011                10/06/2011  
   
                          Mechanically assisted ventilation 10/04/2011            
      10/05/2011  
   
                                                      
  
  
Overview:Formatting of this note might be different from the original.  
10/4/2011  
optimize MV settings, WTE  
current setting:FiO2, 75%, peep 8  
   
   
                          Cardiac insufficiency 10/04/2011                10/05/  
2011  
   
                                                      
  
  
Overview:Formatting of this note might be different from the original.  
10/4/2011  
RV mild to moderate post pump  
goal CI>2.3  
   
   
                          Hypoxemia    10/04/2011                10/06/2011  
   
                          discharge planning 2011                10/19/201  
1  
   
                                                      
  
  
Overview:Formatting of this note might be different from the original.  
age 71,  lives in [place], [place]. No DC needs.  
/  
   
   
                          Pre-op testing 2011                10/04/2011  
   
                                                      
  
  
Overview:Formatting of this note is different from the original.  
Images from the original note were not included.  
[facility]  
PRE-OP CHECKLIST  
  
Surgeon: Deangelo Angulo M.D. Informed Consent Completed: No  
STS Score: 1.4%  
CAD: Yes - CAD on Problem List: Yes  
Is intended procedure a CABG: Yes - is a beta blocker ordered? Yes  
  
H & P completed: Yes  
  
PA/LAT: Completed CT: Completed MRI: N/A LE US: N/A  
  
Cath: Yes - reviewed: Yes Echo:Completed EKG: Completed EF %: 61  
  
PI's: N/A Carotid: Completed Mapping: N/A Dental: Completed PFT's: N/A  
  
Basename 11 0729  
WBC 7.18  
HB 13.1  
HCT 41.8  
  
INR 1.0  
CREAT 1.35  
  
UA: Normal  
HCG:N/A  
  
ABO/ABO Confirmed: Yes  
  
Blood ordered: No  
  
SA Swab: Yes - results: Pending  
  
Last Dose of Anticoagulation: vitamins  
  
Op Note: N/A  
  
Pacemaker Check: N/A  
  
Consults: GI 2001  
  
DM: No  
  
Cardiac Surgical prep: No  
  
SIGNATURE: Krystal Castellanos RN CHECKED BY:  
DATE of SERVICE: 2011  
TIME of SERVICE: 2:23 PM  
  
  
   
   
                          Anemia of chronic disease 2011  
   
                          Intestinal polyp 2011  
   
                                                      
  
  
Overview:Formatting of this note might be different from the original.  
Distal ileum ulcerated polyp.  
   
   
                                                    Nonspecific abnormal results  
 of liver   
function study      2006  
   
                          Impotence of organic origin 2006  
   
                                                    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  
9.9 (morbid   
obesity)                                                    2021  
  
documented as of this encounter (statuses as of 2023)  
[facility]03- History of Past illness Narrative*   
  
                          Problem      Noted Date   Diagnosed Date Resolved Date  
   
                          Stasis ulcer 2023  
   
                          Obesity, Class II, BMI 35-39.9 2021  
   
                          Medicare annual wellness visit, subsequent 2019  
   
                                                    Encounter for long-term (cur  
rent) use of   
medications         2018  
   
                                                      
  
  
Overview:Formatting of this note might be different from the original.  
Added automatically from request for surgery [id number]  
   
   
                          History of colonic polyps 2018  
   
                                                      
  
  
Overview:Formatting of this note might be different from the original.  
Added automatically from request for surgery [id number]  
   
   
                          Gastroesophageal reflux disease 2018  
   
                                                      
  
  
Overview:Formatting of this note might be different from the original.  
Added automatically from request for surgery [id number]  
   
   
                          Acute pulmonary edema 10/05/2011                10/06/  
2011  
   
                                                      
  
  
Overview:Formatting of this note might be different from the original.  
10/5/2011  
cardiogenic and noncardiogenic factors  
  
   
   
                          Atelectasis  10/05/2011                10/06/2011  
   
                          Hypotension  10/04/2011                10/06/2011  
   
                                                      
  
  
Overview:Formatting of this note might be different from the original.  
10/4/2011  
goal map 65-80  
   
   
                          Stress hyperglycemia 10/04/2011                10/06/2  
011  
   
                                                      
  
  
Overview:Formatting of this note might be different from the original.  
10/4/2011  
Perioperative insulin resistance and exacerbation of hyperglycemia.  
Control blood glucose with titration of insulin infusion  
  
10/5/2011  
adequate control  
goal FBG<180  
  
   
   
                          Post-op pain 10/04/2011                10/06/2011  
   
                          Mechanically assisted ventilation 10/04/2011            
      10/05/2011  
   
                                                      
  
  
Overview:Formatting of this note might be different from the original.  
10/4/2011  
optimize MV settings, WTE  
current setting:FiO2, 75%, peep 8  
   
   
                          Cardiac insufficiency 10/04/2011                10/05/  
2011  
   
                                                      
  
  
Overview:Formatting of this note might be different from the original.  
10/4/2011  
RV mild to moderate post pump  
goal CI>2.3  
   
   
                          Hypoxemia    10/04/2011                10/06/2011  
   
                          discharge planning 2011                10/19/201  
1  
   
                                                      
  
  
Overview:Formatting of this note might be different from the original.  
age 71,  lives in [place], [place]. No DC needs.  
/  
   
   
                          Pre-op testing 2011                10/04/2011  
   
                                                      
  
  
Overview:Formatting of this note is different from the original.  
Images from the original note were not included.  
[facility]  
PRE-OP CHECKLIST  
  
Surgeon: Deangelo Angulo M.D. Informed Consent Completed: No  
STS Score: 1.4%  
CAD: Yes - CAD on Problem List: Yes  
Is intended procedure a CABG: Yes - is a beta blocker ordered? Yes  
  
H & P completed: Yes  
  
PA/LAT: Completed CT: Completed MRI: N/A LE US: N/A  
  
Cath: Yes - reviewed: Yes Echo:Completed EKG: Completed EF %: 61  
  
PI's: N/A Carotid: Completed Mapping: N/A Dental: Completed PFT's: N/A  
  
Basename 11 0729  
WBC 7.18  
HB 13.1  
HCT 41.8  
  
INR 1.0  
CREAT 1.35  
  
UA: Normal  
HCG:N/A  
  
ABO/ABO Confirmed: Yes  
  
Blood ordered: No  
  
SA Swab: Yes - results: Pending  
  
Last Dose of Anticoagulation: vitamins  
  
Op Note: N/A  
  
Pacemaker Check: N/A  
  
Consults: GI 2001  
  
DM: No  
  
Cardiac Surgical prep: No  
  
SIGNATURE: Krystal Castellanos RN CHECKED BY:  
DATE of SERVICE: 2011  
TIME of SERVICE: 2:23 PM  
  
  
   
   
                          Anemia of chronic disease 2011  
   
                          Intestinal polyp 2011  
   
                                                      
  
  
Overview:Formatting of this note might be different from the original.  
Distal ileum ulcerated polyp.  
   
   
                                                    Nonspecific abnormal results  
 of liver   
function study      2006  
   
                          Impotence of organic origin 2006  
   
                                                    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  
9.9 (morbid   
obesity)                                                    2021  
  
documented as of this encounter (statuses as of 2023)  
[facility]03- History of Past illness Narrative*   
  
                          Problem      Noted Date   Diagnosed Date Resolved Date  
   
                          Stasis ulcer 2023  
   
                          Obesity, Class II, BMI 35-39.9 2021  
   
                          Medicare annual wellness visit, subsequent 2019  
   
                                                    Encounter for long-term (cur  
rent) use of   
medications         2018  
   
                                                      
  
  
Overview:Formatting of this note might be different from the original.  
Added automatically from request for surgery [id number]  
   
   
                          History of colonic polyps 2018  
   
                                                      
  
  
Overview:Formatting of this note might be different from the original.  
Added automatically from request for surgery [id number]  
   
   
                          Gastroesophageal reflux disease 2018  
   
                                                      
  
  
Overview:Formatting of this note might be different from the original.  
Added automatically from request for surgery [id number]  
   
   
                          Acute pulmonary edema 10/05/2011                10/06/  
2011  
   
                                                      
  
  
Overview:Formatting of this note might be different from the original.  
10/5/2011  
cardiogenic and noncardiogenic factors  
  
   
   
                          Atelectasis  10/05/2011                10/06/2011  
   
                          Hypotension  10/04/2011                10/06/2011  
   
                                                      
  
  
Overview:Formatting of this note might be different from the original.  
10/4/2011  
goal map 65-80  
   
   
                          Stress hyperglycemia 10/04/2011                10/06/2  
011  
   
                                                      
  
  
Overview:Formatting of this note might be different from the original.  
10/4/2011  
Perioperative insulin resistance and exacerbation of hyperglycemia.  
Control blood glucose with titration of insulin infusion  
  
10/5/2011  
adequate control  
goal FBG<180  
  
   
   
                          Post-op pain 10/04/2011                10/06/2011  
   
                          Mechanically assisted ventilation 10/04/2011            
      10/05/2011  
   
                                                      
  
  
Overview:Formatting of this note might be different from the original.  
10/4/2011  
optimize MV settings, WTE  
current setting:FiO2, 75%, peep 8  
   
   
                          Cardiac insufficiency 10/04/2011                10/05/  
2011  
   
                                                      
  
  
Overview:Formatting of this note might be different from the original.  
10/4/2011  
RV mild to moderate post pump  
goal CI>2.3  
   
   
                          Hypoxemia    10/04/2011                10/06/2011  
   
                          discharge planning 2011                10/19/201  
1  
   
                                                      
  
  
Overview:Formatting of this note might be different from the original.  
age 71,  lives in [place], [place]. No DC needs.  
/  
   
   
                          Pre-op testing 2011                10/04/2011  
   
                                                      
  
  
Overview:Formatting of this note is different from the original.  
Images from the original note were not included.  
[facility]  
PRE-OP CHECKLIST  
  
Surgeon: Deangelo Anguol M.D. Informed Consent Completed: No  
STS Score: 1.4%  
CAD: Yes - CAD on Problem List: Yes  
Is intended procedure a CABG: Yes - is a beta blocker ordered? Yes  
  
H & P completed: Yes  
  
PA/LAT: Completed CT: Completed MRI: N/A LE US: N/A  
  
Cath: Yes - reviewed: Yes Echo:Completed EKG: Completed EF %: 61  
  
PI's: N/A Carotid: Completed Mapping: N/A Dental: Completed PFT's: N/A  
  
Basename 11 0729  
WBC 7.18  
HB 13.1  
HCT 41.8  
  
INR 1.0  
CREAT 1.35  
  
UA: Normal  
HCG:N/A  
  
ABO/ABO Confirmed: Yes  
  
Blood ordered: No  
  
SA Swab: Yes - results: Pending  
  
Last Dose of Anticoagulation: vitamins  
  
Op Note: N/A  
  
Pacemaker Check: N/A  
  
Consults: GI 2001  
  
DM: No  
  
Cardiac Surgical prep: No  
  
SIGNATURE: Krystal Castellanos RN CHECKED BY:  
DATE of SERVICE: 2011  
TIME of SERVICE: 2:23 PM  
  
  
   
   
                          Anemia of chronic disease 2011  
   
                          Intestinal polyp 2011  
   
                                                      
  
  
Overview:Formatting of this note might be different from the original.  
Distal ileum ulcerated polyp.  
   
   
                                                    Nonspecific abnormal results  
 of liver   
function study      2006  
   
                          Impotence of organic origin 2006  
   
                                                    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  
9.9 (morbid   
obesity)                                                    2021  
  
documented as of this encounter (statuses as of 2023)  
[facility]- History of Past illness Narrative*   
  
                          Problem      Noted Date   Diagnosed Date Resolved Date  
   
                          Stasis ulcer 2023  
   
                          Obesity, Class II, BMI 35-39.9 2021  
   
                          Medicare annual wellness visit, subsequent 2019  
   
                                                    Encounter for long-term (cur  
rent) use of   
medications         2018  
   
                                                      
  
  
Overview:Formatting of this note might be different from the original.  
Added automatically from request for surgery [id number]  
   
   
                          History of colonic polyps 2018  
   
                                                      
  
  
Overview:Formatting of this note might be different from the original.  
Added automatically from request for surgery [id number]  
   
   
                          Gastroesophageal reflux disease 2018  
   
                                                      
  
  
Overview:Formatting of this note might be different from the original.  
Added automatically from request for surgery [id number]  
   
   
                          Acute pulmonary edema 10/05/2011                10/06/  
2011  
   
                                                      
  
  
Overview:Formatting of this note might be different from the original.  
10/5/2011  
cardiogenic and noncardiogenic factors  
  
   
   
                          Atelectasis  10/05/2011                10/06/2011  
   
                          Hypotension  10/04/2011                10/06/2011  
   
                                                      
  
  
Overview:Formatting of this note might be different from the original.  
10/4/2011  
goal map 65-80  
   
   
                          Stress hyperglycemia 10/04/2011                10/06/2  
011  
   
                                                      
  
  
Overview:Formatting of this note might be different from the original.  
10/4/2011  
Perioperative insulin resistance and exacerbation of hyperglycemia.  
Control blood glucose with titration of insulin infusion  
  
10/5/2011  
adequate control  
goal FBG<180  
  
   
   
                          Post-op pain 10/04/2011                10/06/2011  
   
                          Mechanically assisted ventilation 10/04/2011            
      10/05/2011  
   
                                                      
  
  
Overview:Formatting of this note might be different from the original.  
10/4/2011  
optimize MV settings, WTE  
current setting:FiO2, 75%, peep 8  
   
   
                          Cardiac insufficiency 10/04/2011                10/05/  
2011  
   
                                                      
  
  
Overview:Formatting of this note might be different from the original.  
10/4/2011  
RV mild to moderate post pump  
goal CI>2.3  
   
   
                          Hypoxemia    10/04/2011                10/06/2011  
   
                          discharge planning 2011                10/19/201  
1  
   
                                                      
  
  
Overview:Formatting of this note might be different from the original.  
age 71,  lives in [place], [place]. No DC needs.  
/  
   
   
                          Pre-op testing 2011                10/04/2011  
   
                                                      
  
  
Overview:Formatting of this note is different from the original.  
Images from the original note were not included.  
[facility]  
PRE-OP CHECKLIST  
  
Surgeon: Deangelo Angulo M.D. Informed Consent Completed: No  
STS Score: 1.4%  
CAD: Yes - CAD on Problem List: Yes  
Is intended procedure a CABG: Yes - is a beta blocker ordered? Yes  
  
H & P completed: Yes  
  
PA/LAT: Completed CT: Completed MRI: N/A LE US: N/A  
  
Cath: Yes - reviewed: Yes Echo:Completed EKG: Completed EF %: 61  
  
PI's: N/A Carotid: Completed Mapping: N/A Dental: Completed PFT's: N/A  
  
Basename 11 0729  
WBC 7.18  
HB 13.1  
HCT 41.8  
  
INR 1.0  
CREAT 1.35  
  
UA: Normal  
HCG:N/A  
  
ABO/ABO Confirmed: Yes  
  
Blood ordered: No  
  
SA Swab: Yes - results: Pending  
  
Last Dose of Anticoagulation: vitamins  
  
Op Note: N/A  
  
Pacemaker Check: N/A  
  
Consults: GI 2001  
  
DM: No  
  
Cardiac Surgical prep: No  
  
SIGNATURE: Krystal Castellanos RN CHECKED BY:  
DATE of SERVICE: 2011  
TIME of SERVICE: 2:23 PM  
  
  
   
   
                          Anemia of chronic disease 2011  
   
                          Intestinal polyp 2011  
   
                                                      
  
  
Overview:Formatting of this note might be different from the original.  
Distal ileum ulcerated polyp.  
   
   
                                                    Nonspecific abnormal results  
 of liver   
function study      2006  
   
                          Impotence of organic origin 2006  
   
                                                    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  
9.9 (morbid   
obesity)                                                    2021  
  
documented as of this encounter (statuses as of 2024)  
[facility]03- History of Past illness Narrative*   
  
                          Problem      Noted Date   Diagnosed Date Resolved Date  
   
                          Stasis ulcer 2023  
   
                          Obesity, Class II, BMI 35-39.9 2021  
   
                          Medicare annual wellness visit, subsequent 2019  
   
                                                    Encounter for long-term (cur  
rent) use of   
medications         2018  
   
                                                      
  
  
Overview:Formatting of this note might be different from the original.  
Added automatically from request for surgery [id number]  
   
   
                          History of colonic polyps 2018  
   
                                                      
  
  
Overview:Formatting of this note might be different from the original.  
Added automatically from request for surgery [id number]  
   
   
                          Gastroesophageal reflux disease 2018  
   
                                                      
  
  
Overview:Formatting of this note might be different from the original.  
Added automatically from request for surgery [id number]  
   
   
                          Acute pulmonary edema 10/05/2011                10/06/  
2011  
   
                                                      
  
  
Overview:Formatting of this note might be different from the original.  
10/5/2011  
cardiogenic and noncardiogenic factors  
  
   
   
                          Atelectasis  10/05/2011                10/06/2011  
   
                          Hypotension  10/04/2011                10/06/2011  
   
                                                      
  
  
Overview:Formatting of this note might be different from the original.  
10/4/2011  
goal map 65-80  
   
   
                          Stress hyperglycemia 10/04/2011                10/06/2  
011  
   
                                                      
  
  
Overview:Formatting of this note might be different from the original.  
10/4/2011  
Perioperative insulin resistance and exacerbation of hyperglycemia.  
Control blood glucose with titration of insulin infusion  
  
10/5/2011  
adequate control  
goal FBG<180  
  
   
   
                          Post-op pain 10/04/2011                10/06/2011  
   
                          Mechanically assisted ventilation 10/04/2011            
      10/05/2011  
   
                                                      
  
  
Overview:Formatting of this note might be different from the original.  
10/4/2011  
optimize MV settings, WTE  
current setting:FiO2, 75%, peep 8  
   
   
                          Cardiac insufficiency 10/04/2011                10/05/  
2011  
   
                                                      
  
  
Overview:Formatting of this note might be different from the original.  
10/4/2011  
RV mild to moderate post pump  
goal CI>2.3  
   
   
                          Hypoxemia    10/04/2011                10/06/2011  
   
                          discharge planning 2011                10/19/201  
1  
   
                                                      
  
  
Overview:Formatting of this note might be different from the original.  
age 71,  lives in [place], [place]. No DC needs.  
/  
   
   
                          Pre-op testing 2011                10/04/2011  
   
                                                      
  
  
Overview:Formatting of this note is different from the original.  
Images from the original note were not included.  
[facility]  
PRE-OP CHECKLIST  
  
Surgeon: Deangelo Angulo M.D. Informed Consent Completed: No  
STS Score: 1.4%  
CAD: Yes - CAD on Problem List: Yes  
Is intended procedure a CABG: Yes - is a beta blocker ordered? Yes  
  
H & P completed: Yes  
  
PA/LAT: Completed CT: Completed MRI: N/A LE US: N/A  
  
Cath: Yes - reviewed: Yes Echo:Completed EKG: Completed EF %: 61  
  
PI's: N/A Carotid: Completed Mapping: N/A Dental: Completed PFT's: N/A  
  
Basename 11 0729  
WBC 7.18  
HB 13.1  
HCT 41.8  
  
INR 1.0  
CREAT 1.35  
  
UA: Normal  
HCG:N/A  
  
ABO/ABO Confirmed: Yes  
  
Blood ordered: No  
  
SA Swab: Yes - results: Pending  
  
Last Dose of Anticoagulation: vitamins  
  
Op Note: N/A  
  
Pacemaker Check: N/A  
  
Consults: GI 2001  
  
DM: No  
  
Cardiac Surgical prep: No  
  
SIGNATURE: Krystal Castellanos RN CHECKED BY:  
DATE of SERVICE: 2011  
TIME of SERVICE: 2:23 PM  
  
  
   
   
                          Anemia of chronic disease 2011  
   
                          Intestinal polyp 2011  
   
                                                      
  
  
Overview:Formatting of this note might be different from the original.  
Distal ileum ulcerated polyp.  
   
   
                                                    Nonspecific abnormal results  
 of liver   
function study      2006  
   
                          Impotence of organic origin 2006  
   
                                                    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  
9.9 (morbid   
obesity)                                                    2021  
  
documented as of this encounter (statuses as of 2024)  
[facility]03- History of Past illness Narrative*   
  
                          Problem      Noted Date   Diagnosed Date Resolved Date  
   
                          Stasis ulcer 2023  
   
                          Obesity, Class II, BMI 35-39.9 2021  
   
                          Medicare annual wellness visit, subsequent 2019  
   
                                                    Encounter for long-term (cur  
rent) use of   
medications         2018  
   
                                                      
  
  
Overview:Formatting of this note might be different from the original.  
Added automatically from request for surgery [id number]  
   
   
                          History of colonic polyps 2018  
   
                                                      
  
  
Overview:Formatting of this note might be different from the original.  
Added automatically from request for surgery [id number]  
   
   
                          Gastroesophageal reflux disease 2018  
   
                                                      
  
  
Overview:Formatting of this note might be different from the original.  
Added automatically from request for surgery [id number]  
   
   
                          Acute pulmonary edema 10/05/2011                10/06/  
2011  
   
                                                      
  
  
Overview:Formatting of this note might be different from the original.  
10/5/2011  
cardiogenic and noncardiogenic factors  
  
   
   
                          Atelectasis  10/05/2011                10/06/2011  
   
                          Hypotension  10/04/2011                10/06/2011  
   
                                                      
  
  
Overview:Formatting of this note might be different from the original.  
10/4/2011  
goal map 65-80  
   
   
                          Stress hyperglycemia 10/04/2011                10/06/2  
011  
   
                                                      
  
  
Overview:Formatting of this note might be different from the original.  
10/4/2011  
Perioperative insulin resistance and exacerbation of hyperglycemia.  
Control blood glucose with titration of insulin infusion  
  
10/5/2011  
adequate control  
goal FBG<180  
  
   
   
                          Post-op pain 10/04/2011                10/06/2011  
   
                          Mechanically assisted ventilation 10/04/2011            
      10/05/2011  
   
                                                      
  
  
Overview:Formatting of this note might be different from the original.  
10/4/2011  
optimize MV settings, WTE  
current setting:FiO2, 75%, peep 8  
   
   
                          Cardiac insufficiency 10/04/2011                10/05/  
2011  
   
                                                      
  
  
Overview:Formatting of this note might be different from the original.  
10/4/2011  
RV mild to moderate post pump  
goal CI>2.3  
   
   
                          Hypoxemia    10/04/2011                10/06/2011  
   
                          discharge planning 2011                10/19/201  
1  
   
                                                      
  
  
Overview:Formatting of this note might be different from the original.  
age 71,  lives in [place], [place]. No DC needs.  
/  
   
   
                          Pre-op testing 2011                10/04/2011  
   
                                                      
  
  
Overview:Formatting of this note is different from the original.  
Images from the original note were not included.  
[facility]  
PRE-OP CHECKLIST  
  
Surgeon: Deangelo Angulo M.D. Informed Consent Completed: No  
STS Score: 1.4%  
CAD: Yes - CAD on Problem List: Yes  
Is intended procedure a CABG: Yes - is a beta blocker ordered? Yes  
  
H & P completed: Yes  
  
PA/LAT: Completed CT: Completed MRI: N/A LE US: N/A  
  
Cath: Yes - reviewed: Yes Echo:Completed EKG: Completed EF %: 61  
  
PI's: N/A Carotid: Completed Mapping: N/A Dental: Completed PFT's: N/A  
  
Basename 11 0729  
WBC 7.18  
HB 13.1  
HCT 41.8  
  
INR 1.0  
CREAT 1.35  
  
UA: Normal  
HCG:N/A  
  
ABO/ABO Confirmed: Yes  
  
Blood ordered: No  
  
SA Swab: Yes - results: Pending  
  
Last Dose of Anticoagulation: vitamins  
  
Op Note: N/A  
  
Pacemaker Check: N/A  
  
Consults: GI 2001  
  
DM: No  
  
Cardiac Surgical prep: No  
  
SIGNATURE: Krystal Castellanos RN CHECKED BY:  
DATE of SERVICE: 2011  
TIME of SERVICE: 2:23 PM  
  
  
   
   
                          Anemia of chronic disease 2011  
   
                          Intestinal polyp 2011  
   
                                                      
  
  
Overview:Formatting of this note might be different from the original.  
Distal ileum ulcerated polyp.  
   
   
                                                    Nonspecific abnormal results  
 of liver   
function study      2006  
   
                          Impotence of organic origin 2006  
   
                                                    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  
9.9 (morbid   
obesity)                                                    2021  
  
documented as of this encounter (statuses as of 2024)  
[facility]
  * Patient Instructions*   
  
Júnior Ahuja MD - 2023 7:33 PM EDT  
  
Formatting of this note might be different from the original.  
STOP FUROSEMIDE WHILE ON TORSEMIDE X 5 DAYS.  
Electronically signed by Júnior Ahuja MD at 2023 7:33 PM EDT  
  
  
  
documented in this encounterPro[facility]- History of Present 
illness Narrative* Júnior Ahuja MD - 2023 7:16 PM EDTFormatting of 
  this note is different from the original.  
This note was created using NoteWriter.  
Subjective  
Patient presents with:  
[facility]  
  
Mitesh Braun is a 83 year old male here for EC follow up. He was seen 3/10 
for venous stasis ulceration of the right calf and cellulitis. He was treated 
with doxycycline with mild improvement. Hewas advised [facility], and he has 
an appointment tomorrow for initial evaluation, possibly with Dr. Jaime Madrigal. 
He also had a rash on his left leg that was not only itchy but burning.  
  
He had blisters of his left medial hand near the wrist from handling hot jelly.  
  
Review of Systems  
Constitutional: Negative for chills and fever.  
Respiratory: Negative for shortness of breath.  
Cardiovascular: Positive for leg swelling.  
Skin: Positive for rash and wound.  
  
ACTIVE PROBLEM LIST  
Essential hypertension  
Caren On Cpap  
Esophageal Reflux  
Gout  
Mixed hyperlipidemia  
Impaired Fasting Glucose  
Aortic Valve Disorder  
Polycystic Kidney  
Adenomatous Colon Polyp  
Thrombocytopenia (HCC)  
Cad (Coronary Artery Disease), Native Coronary Artery  
Adjustment Disorder With Depressed Mood  
Asthma Exacerbation  
Peripheral Polyneuropathy  
Splenomegaly  
Ckd (Chronic Kidney Disease) Stage 3, Gfr 30-59 Ml/Min (Hcc)  
Abnormality of Gait  
Anemia of Chronic Disease  
Urge Incontinence of Urine  
Obesity, Class II, Bmi 35-39.9  
Frequent Falls  
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  
Iron Malabsorption  
Liver Lesion  
  
Current Outpatient Medications  
Medication Sig  
oxybutynin ER (DITROPAN XL) 15 mg 24 hr Extended Rel Tab Take 2 tablets by mouth
 once daily.  
furosemide (LASIX) 20 mg tablet TAKE 1 TABLET BY MOUTH EVERY OTHER RDAY  
Diaper,Brief, Adult,Disposable (DEPEND EASY FIT UNDERGARMENTS) 1 Each three 
times daily.  
ascorbic acid, vitamin C, (VITAMIN C) 500 mg tablet Take 1 tablet by mouth once 
daily.  
ascorbic acid-elderberry fruit 100-50 mg chew Take 2 tablets by mouth once 
daily.  
cyanocobalamin (VITAMIN B-12) 2,500 mcg tablet Take 2,500 mcg by mouth once 
daily.  
melatonin 10 mg tab Take 1 tablet by mouth as needed.  
aspirin, enteric coated (ASPIRIN, ENTERIC COATED) 81 mg EC tablet Take 81 mg by 
mouth once daily.  
albuterol HFA (PROVENTIL HFA, VENTOLIN HFA) 90 mcg/actuation inhaler Inhale 2 
Puffs as instructed every 4 hours as needed for wheezing/shortness of breath.  
omeprazole (PRILOSEC) 40 mg capsule Take 1 capsule by mouth once daily.  
allopurinol (ZYLOPRIM) 300 mg tablet Take 1 tablet by mouth once daily.  
magnesium oxide 400 mg magnesium cap Take 1 capsule by mouth once daily.  
fenofibrate nanocrystallized (TRICOR) 48 mg tablet Take 1 tablet by mouth once 
daily.  
sertraline (ZOLOFT) 50 mg tablet Take 1 tablet by mouth once daily.  
nitroglycerin sublingual (NITROQUICK) 0.4 mg SL tablet Dissolve 1 tablet under 
the tongue every 5 minutes as needed for chest pain. FOR CHEST PAIN. IF NO 
RELIEF CALL 911  
fluticasone (FLONASE) 50 mcg/actuation nasal spray Use 1 Spray in each nostril 
once daily.  
ferrous sulfate 325 mg (65 mg iron) EC tablet Take 1 tablet by mouth twice daily
 with meals. (Patient taking differently: Take 325 mg by mouth once daily.)  
amoxicillin (POLYMOX, AMOXIL) 500 mg capsule Take four tablets, one hour prior 
to dental work (Patient taking differently: Take four tablets by mouth , one 
hour prior to dental work)  
fluticasone-vilanterol (BREO ELLIPTA) 200-25 mcg/dose inhaler Inhale 1 
Inhalation as instructed once daily. Inhale one puff once daily. DO NOT CLICK 
OPEN UNTIL READY FOR DOSE  
multivitamin tablet Take 1 tablet by mouth once daily.  
torsemide (DEMADEX) 20 mg tablet Take 1 tablet by mouth once daily for 5 days.  
  
No current facility-administered medications for this visit.  
  
  
Objective  
/72 (BP Site: Left Arm, BP Position: Sitting, BP Cuff Size: Large Adult)  
 Pulse 72   Wt 109.3 kg (241 lb)   SpO2 100%   BMI 37.75 kg/m  
Physical Exam  
Constitutional:  
General: He is not in acute distress.  
Appearance: He is not ill-appearing.  
Cardiovascular:  
Rate and Rhythm: Normal rate and regular rhythm.  
Comments: Lymphedema. Right leg with scattered excoriated superficial small 
ulcers, some with surrounding erythema, most are non weeping, some with serous 
drainage. No lymphangitic streaking, no tenderness. Left leg with few ulcers. 
Left medial knee with palm sized cluster of zosteriform post inflammatory 
pigmented maculopapules.  
Pulmonary:  
Breath sounds: Normal breath sounds.  
Musculoskeletal:  
Right lower leg: 3+ Edema present.  
Left lower le+ Edema present.  
Skin:  
Comments: 2 small, vesicles of the medial left hand from burn. No s/s infection.  
Neurological:  
Mental Status: He is alert.  
  
Assessment and Plan  
  
1. Venous stasis ulcer of other part of right lower leg limited to breakdown of 
skin without varicose veins (HCC) - ICD9: 459.81, 707.15, ICD10: I87.2, L97.811 
(primary diagnosis)  
- Keep [facility] appointment.  
- TORSEMIDE 20 MG TABLET. 40 mg daily x 5 days. Hold furosemide while on this.  
- CEPHALEXIN 500 MG CAPSULE. 4 times per day x 7 days.  
- We can consider extending torsemide, but will need to monitor his CKD. Call 
back if needed.  
  
2. Rash - ICD9: 782.1, ICD10: R21  
Probably zoster, beyond window for antiviral.  
  
3. Partial thickness burn of left hand, unspecified site of hand, initial 
encounter - ICD9: 944.20,ICD10: T23.202A  
- Do not puncture. If it drains, keep clean and apply antibiotic ointment.  
  
Júnior Ahuja MD  
  
Electronically signed by Júnior hAuja MD at 2023 8:39 AM EDT  
  
documented in this encounterPro[facility]ica [facility]03- Miscellaneous Notes* 
  Telephone Encounter - Son Norton Ma - 2023 3:17 PM EDTFormatting of this
   note might be different from the original.  
Faxed.  
Electronically signed by Son Norton Ma at 2023 3:17 PM EDT  
  
* Telephone Encounter - Starr Marquez LPN - 2023 10:55 AM EDTFormatting
   of this note might be different from the original.  
Cece from [facility] calling patient had called to schedule appt, they 
have no referral. [facility]. Fax number is [phone], pending order needs diagnosis.  
Please advise  
Electronically signed by Starr Marquez LPN at 2023 11:00 AM EDT  
  
documented in this encounterProMedica [facility]03- Note[facility]03- History of Present illness Narrative* Marin Rankin MD -
   03/10/2023 2:27 PM ESTFormatting of this note is different from the original.  
Patient presents with:  
sores on lower legs: X 2 weeks  
  
HPI:  
Skin Lesion:  
Location: right lower leg  
Duration: 2 weeks  
Pruritis/Pain: was pruritic, neosporin takes the hurt away  
Change: starting to seep watery liquid  
Drainage/blister/pustule/ulceration: open sores, red.  
Treatment: neosporin  
  
Denies chest pain, shortness of breath, dizziness, palpitations, fever, chills.  
Medication compliance: no changes recently. Sees heart and kidney doctors, no 
recent changes.  
  
MEDICATIONS:  
oxybutynin ER (DITROPAN XL) 15 mg 24 hr Extended Rel Tab Take 2 tablets by mouth
 once daily.  
furosemide (LASIX) 20 mg tablet TAKE 1 TABLET BY MOUTH EVERY OTHER RDAY  
Diaper,Brief, Adult,Disposable (DEPEND EASY FIT UNDERGARMENTS) 1 Each three 
times daily.  
ascorbic acid, vitamin C, (VITAMIN C) 500 mg tablet Take 1 tablet by mouth once 
daily.  
ascorbic acid-elderberry fruit 100-50 mg chew Take 2 tablets by mouth once 
daily.  
cyanocobalamin (VITAMIN B-12) 2,500 mcg tablet Take 2,500 mcg by mouth once 
daily.  
melatonin 10 mg tab Take 1 tablet by mouth as needed.  
aspirin, enteric coated (ASPIRIN, ENTERIC COATED) 81 mg EC tablet Take 81 mg by 
mouth once daily.  
albuterol HFA (PROVENTIL HFA, VENTOLIN HFA) 90 mcg/actuation inhaler Inhale 2 
Puffs as instructed every 4 hours as needed for wheezing/shortness of breath.  
omeprazole (PRILOSEC) 40 mg capsule Take 1 capsule by mouth once daily.  
allopurinol (ZYLOPRIM) 300 mg tablet Take 1 tablet by mouth once daily.  
magnesium oxide 400 mg magnesium cap Take 1 capsule by mouth once daily.  
fenofibrate nanocrystallized (TRICOR) 48 mg tablet Take 1 tablet by mouth once 
daily.  
sertraline (ZOLOFT) 50 mg tablet Take 1 tablet by mouth once daily.  
nitroglycerin sublingual (NITROQUICK) 0.4 mg SL tablet Dissolve 1 tablet under 
the tongue every 5 minutes as needed for chest pain. FOR CHEST PAIN. IF NO 
RELIEF CALL 911  
fluticasone (FLONASE) 50 mcg/actuation nasal spray Use 1 Spray in each nostril 
once daily.  
ferrous sulfate 325 mg (65 mg iron) EC tablet Take 1 tablet by mouth twice daily
 with meals. (Patient taking differently: Take 325 mg by mouth once daily.)  
amoxicillin (POLYMOX, AMOXIL) 500 mg capsule Take four tablets, one hour prior 
to dental work (Patient taking differently: Take four tablets by mouth , one 
hour prior to dental work)  
fluticasone-vilanterol (BREO ELLIPTA) 200-25 mcg/dose inhaler Inhale 1 
Inhalation as instructed once daily. Inhale one puff once daily. DO NOT CLICK 
OPEN UNTIL READY FOR DOSE  
multivitamin tablet Take 1 tablet by mouth once daily.  
torsemide (DEMADEX) 20 mg tablet Take 1 tablet by mouth once daily for 5 days.  
  
ALLERGIES:  
ALLERGIES  
Allergen Reactions  
Bees Anaphylaxis  
Acetaminophen Other: See Comments  
Hydrocodone GI Upset  
Lipitor [Atorvastat* Itching  
Singulair [Monteluk* Itching  
Strawberries Hives  
hives  
  
VITALS:  
/82   Pulse 92   Temp 36.7 C (98 F) (Tympanic)   Resp 18   Wt 108.2 kg 
(238 lb 9.6 oz)   UrW731%   BMI 37.37 kg/m  
  
Last 6 Encounter Wt Readings:  
Date: Wt:  
03/10/2023 108.2 kg (238 lb 9.6 oz)  
2023 108.2 kg (238 lb 8 oz)  
2023 111.6 kg (246 lb)  
2023 114.3 kg (252 lb)  
2023 114.3 kg (252 lb)  
2022 110.9 kg (244 lb 8 oz)  
  
PE:  
Pleasant, in no acute distress. Sitting in wheeled chair. Accompanied by his 
daughter.  
EXT: bilateral lymphedema. 2cm dry red based ulcer left upper shin; few 
millimeters of erythematoushalo.  
Numerous excoriation ulcers with red base anterior right lower leg from mid shin
 to ankle. Ulcers ranging in size from 1/2 to 2 cm. Surrounding rythema 
increases to confluent at and above the ankle. There is a 3 cm deeper ulceration
 with some white granulation medial lower calf.  
  
ASSESSMENT/PLAN:  
1. Venous stasis ulcer of right calf limited to breakdown of skin without 
varicose veins (HCC) - ICD9: 459.81, 707.12, ICD10: I87.2, L97.211 (primary 
diagnosis)  
2. Cellulitis of right lower leg - ICD9: 682.6, ICD10: L03.115  
Inflammation from open sores, possible early cellulitis.  
- DOXYCYCLINE MONOHYDRATE 100 MG CAPSULE  
Change ointment to bacitracin which has lower allergy incidence.  
Elevate legs when able.  
Wound Center recommended. He will call to schedule with [facility].  
  
Marin Rankin MD  
  
  
Electronically signed by Marin Rankin MD at 03/10/2023 2:55 PM EST  
  
documented in this encounterProMedica [facility]edo Hospital02- Miscellaneous Notes* 
  Telephone Encounter - KELSEA Rahman RN - 2023 12:57 PM ESTFormatting of
   this note is different from the original.  
Patient has been identified by name and date of birth: Yes, Provider Dr. Ahuja Date 23 Time 12:58 pm  
Patient phones for refill(s):  
Requested Prescriptions  
  
Pending Prescriptions Disp Refills  
oxybutynin ER (DITROPAN XL) 15 mg 24 hr Extended Rel Tab 180 tablet 3  
Sig: Take 2 tablets by mouth once daily.  
  
Date of last office visit with pcp: 23. Next appt: 23  
Last 2 Encounter Wt Readings:  
Date: Wt:  
2023 108.2 kg (238 lb 8 oz)  
2023 111.6 kg (246 lb)  
Previous labs/tests for medication: Blood Pressure:  
BUN (mg/dL)  
Date Value  
2023 32  
2022 28  
  
Sodium (mmol/L)  
Date Value  
2023 137  
2022 139  
Last 1 Encounter BP Readings:  
Date: BP:  
2023 122/72  
Liver Function:  
ALT (U/L)  
Date Value  
2023 29  
2021 18  
2021 19  
  
AST (U/L)  
Date Value  
2023 41  
2021 30  
2021 31  
  
Please advise. Thank you. KELSEA Rahman RN  
  
Electronically signed by KELSEA Rahman RN at 2023 12:59 PM EST  
  
documented in this encounterProMedica Toledo Hospital02- NoteSouthern [facility]02- History of Present illness Narrative* Tello Ortega DO - 
  2023 9:33 AM ESTFormatting of this note is different from the original.  
Hematologic problem(s):  
1) IgG kappa monoclonal gammopathy  
2) BREEZY.  
3) Liver mass.  
4) Hypercalcemia.  
5) Splenomegaly.  
6) Cirrhosis.  
  
HPI: The patient is an 81 yo male with a PMH significant for HTN, polycystic 
kidney disease (seeingnephrology for ~15 years), HLD, aortic repair (bovine 
valve), CAD, CAREN (CPAP), asthma, hypercalcemia, splenomegaly (noted on CT chest 
2014; spleen up to 18.4 cm in AP dimension; not enlarged on CT enterography 
) and obesity.  
  
He had lab work ordered at his nephrologist office. Protein electrophoresis of 
the urine demonstrated no evidence of monoclonal spike. Urine protein creatinine
 ratio was elevated. Chemistry panel showed a creatinine of 1.33 mg/dL. Calcium 
mildly elevated 10.6 mg/dL. Vitamin D39.7 PTH 93.4 CBC significant for a total 
white count of 4900. No differential performed. Hemoglobin 9.0 g/dL. . 
Platelet count 79,000. Protein electrophoresis of the serum revealed a M spike 
but no immunofixation was performed.  
  
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 Worse over time.
 He's never been given an answer as to why.  
  
Balance is off and he uses wheeled walker.  
  
[place] alone [place]. Has home health aides for cleaning and washing 
clothes [facility]. Daughter lives close by and he gets meals on wheels.  
  
Was having left posterior hip pain and got relief with Absorsene .  
  
Presents for ongoing hematologic management.  
  
Interim history:  
He offers no complaints today.  
  
He received 10 doses of iron sucrose.  
Feels much better.  
  
Sensory neuropathy symptoms stable.  
Seen by neurology group in [place].  
Was told nothing to be done for neuropathy.  
Uses wheeled walker.  
  
Better appetite. Gets meals on wheels.  
No abdominal pain or bloating.  
  
Underwent bone marrow biopsy at [facility].  
  
ROS:  
Constitutional: Denies episodes of fever and night sweats.  
Neuro: See above. He has mild left hemiparesis.  
HEENT: No recent change in voice, vision or hearing.  
Resp: Denies cough, wheeze and hemoptysis. Denies shortness of breath at rest.  
CVS: Denies exertional chest pain, PND, orthopnea. Chronic bilateral lower 
extremity swelling.  
GI: Bowels typically move twice a day. Formed stools. Black from iron. No blood 
observed..  
: Denies dysuria or gross hematuria.  
Endo: Denies hot flashes. Denies polyuria and polydipsia. Denies heat and cold 
intolerance.  
Musculoskeletal: No musculoskeletal pain other than left hip pain as outlined 
above.  
Derm: Denies rash. Denies jaundice and diffuse pruritis.  
Heme: Denies unusual bleeding and unexplained bruising.  
Psych: Normal mood.  
  
PHYSICAL EXAM:  
Vitals: Blood pressure 122/72, pulse 66, temperature 36.3 C (97.3 F), weight 
108.2 kg (238 lb 8 oz), SpO2 99 %.  
Well-appearing and in no acute distress.  
EYES: Sclerae are anicteric bilaterally.  
LYMPHATIC: There is no palpable cervical or supraclavicular adenopathy.  
RESPIRATORY: Inspiratory breath sounds are of normal intensity in all fields. No
 rales, wheezes or rhonchi.  
CARDIOVASCULAR: Rhythm is regular.  
ABDOMEN: The abdomen is nondistended.  
Extremities: Bilateral lower extremity swelling. Right somewhat worse than left.
 Overlying pitting.Stable.  
SKIN: No jaundice.  
NEUROLOGIC: CNs II-XII are grossly intact.  
  
LABS:  
Component Latest Ref Rng & Units 2022  
WBC 3.70 - 11.00 k/uL 5.22 4.65 3.87  
RBC 4.20 - 6.00 m/uL 3.27 (L) 3.73 (L) 3.63 (L)  
Hemoglobin 13.0 - 17.0 g/dL 10.9 (L) 11.1 (L) 11.8 (L)  
Hematocrit 39.0 - 51.0 % 34.6 (L) 36.0 (L) 36.2 (L)  
MCV 80.0 - 100.0 fL 105.8 (H) 96.5 99.7  
MCH 26.0 - 34.0 pg 33.3 29.8 32.5  
MCHC 30.5 - 36.0 g/dL 31.5 30.8 32.6  
RDW-CV 11.5 - 15.0 % 15.0 16.9 (H) 20.0 (H)  
Platelet Count 150 - 400 k/uL 69 (L) 98 (L) 66 (L)  
MPV 9.0 - 12.7 fL 12.2 11.4 9.8  
Neut% % 78.9 79.1  
Abs Neut (ANC) 1.45 - 7.50 k/uL 3.67 3.06  
Lymph% % 10.8 11.1  
Abs Lymph 1.00 - 4.00 k/uL 0.50 (L) 0.43 (L)  
Mono% % 5.6 6.2  
Abs Mono <0.87 k/uL 0.26 0.24  
Eosin% % 3.4 2.8  
Abs Eosin <0.46 k/uL 0.16 0.11  
Baso% % 0.9 0.5  
Abs Baso <0.11 k/uL 0.04 <0.03  
Immature Gran % % 0.4 0.3  
IMMATURE GRANS (ABS) <0.10 k/uL <0.03 <0.03  
NRBC /100 WBC 0.0 0.0  
Absolute nRBC <0.01 k/uL <0.01 <0.01 <0.01  
DTYPE Auto Auto  
Iron 41 - 186 ug/dL 30 (L) 46  
TIBC 232 - 386 ug/dL 370 360  
Transferrin Saturation 15.0 - 57.0 % 8.1 (L) 12.8 (L)  
Ferritin 30.3 - 565.7 ng/mL 24.8 (L) 50.4  
  
Component Latest Ref Rng & Units 2021  
Protein, Total 6.3 - 8.0 g/dL 6.4 5.8 (L) 6.6  
Albumin 3.9 - 4.9 g/dL 3.8 (L) 3.5 (L) 3.8 (L)  
Calcium 8.5 - 10.2 mg/dL 10.6 (H) 10.8 (H) 10.8 (H)  
Bilirubin, Total 0.2 - 1.3 mg/dL 0.5 0.4 0.5  
Alkaline Phosphatase 38 - 113 U/L 62 85 111  
AST 14 - 40 U/L 31 30 35  
Glucose 74 - 99 mg/dL 103 (H) 108 (H) 98  
BUN 9 - 24 mg/dL 26 (H) 33 (H) 27 (H)  
Creatinine 0.73 - 1.22 mg/dL 1.16 1.31 (H) 1.01  
Sodium 136 - 144 mmol/L 141 140 135 (L)  
Potassium 3.7 - 5.1 mmol/L 4.8 5.5 (H) 4.0  
Chloride 97 - 105 mmol/L 109 (H) 108 (H) 103  
CO2 22 - 30 mmol/L 23 25 25  
Anion Gap 9 - 18 mmol/L 9 7 (L) 7 (L)  
ALT 10 - 54 U/L 19 21 19  
eGFR-African American >60  
eGFR-All Other Races . >60  
eGFR >=60 mL/min/1.73m 54 (L) 74  
  
Component Latest Ref Rng & Units 2022  
Cold Agglut, 4 degrees C <1:32 Dilutions <1:32  
Cold Agglut, 37 degrees C Dilutions Test not indicated when titer is <1:32 at 4 
degrees C.  
  
Component Latest Ref Rng & Units 2022  
PT Sec <13.1 sec 10.9  
PT INR 0.9 - 1.3 1.1  
Pathologist Interpretation, CBCDIF Normocytic anemia with slight polychromasia .
 . .  
Pathologist ([facility]) Reviewed by Savanah Cole M.D., Ph.D  
APTT 23.0 - 32.4 sec 26.2  
Fibrinogen Ag 149 - 353 mg/dL 353  
  
Component Latest Ref Rng & Units 2022  
IgG 700 - 1,600 mg/dL 1,120  
IgA 70 - 400 mg/dL 346  
IgM 40 - 230 mg/dL 79  
  
Component Latest Ref Rng & Units 2022  
Albumin 3.43 - 5.41 g/dL 3.62  
Alpha 1 Globulin 0.18 - 0.43 g/dL 0.32  
Alpha 2 Globulin 0.42 - 0.98 g/dL 0.66  
Beta Globulin 0.61 - 1.17 g/dL 0.87  
Gamma Globulin 0.53 - 1.51 g/dL 1.12  
Interpretation (Prot Electro) No definitive M protein is identified on protein 
electrophoresis. An M protein is identified on protein electrophoresis. (A)  
Interpretation Comment for Protein Electrophoresis See separate immunofixation 
report for characterization of monoclonal gammopathy. . . .  
M-Protein Location Gamma Fraction 1  
M-Protein Concentration <=0.00 g/dL 0.30 (H)  
SPE Staff Review Reviewed by Sanaz Parrish MD  
  
Component Latest Ref Rng & Units 2022  
Kappa Free, Serum 3.3 - 19.4 mg/L 65.0 (H)  
Lambda Free, Serum 5.7 - 26.3 mg/L 37.1 (H)  
K/L Ratio, Serum 0.26 - 1.65 1.75 (H)  
  
IgG kappa on immunofixation of serum.  
  
Component Latest Ref Rng & Units 2023  
PTH, Intact 15 - 65 pg/mL 69 (H)  
  
PATHOLOGY:  
Bone marrow biopsy done at [facility] 2023:  
Hypercellular bone marrow with trilineage hematopoiesis.  
No evidence of lymphoproliferative disorder or plasma cell dyscrasia.  
  
Flow cytometry did not reveal evidence of lymphoproliferative disorder. There 
was no monoclonal plasma cell neoplasm.  
  
Plasma cells reported at 2% on aspirate.  
Iron was reported as rare stainable iron.  
  
Fluorescence in situ hybridization for BCR ABL was negative.  
  
46 XY [20].  
  
IMAGING:  
US 2022:  
IMPRESSION:  
1. There is a new hyperechoic nodule in the right lobe of the liver  
which could be further evaluated by MRI  
2. Nodular contour of the liver which may be due to cirrhosis  
3. Splenomegaly  
  
Bone survey 2022:  
IMPRESSION:  
SEVERAL SUBCENTIMETER LUCENT AREAS IN THE CALVARIUM.  
  
DIFFUSE OSTEOPENIA.  
  
DEGENERATIVE CHANGES AS DESCRIBED. POSTOPERATIVE CHANGES AS DESCRIBED.  
  
NO ADDITIONAL FOCAL LYTIC OR BLASTIC ABNORMALITY IS APPRECIATED.  
  
ASSESSMENT/PLAN:  
(D47.2) MGUS (monoclonal gammopathy of unknown significance) (primary encounter 
diagnosis)  
(D47.2) IgG monoclonal gammopathy  
Assessment:  
-The patient is an 82-year-old male with a past medical history as outlined 
above. He has no history of diabetes but has longstanding worsening sensory 
neuropathy of the feet and left hand. He has polycystic kidney disease as well 
as chronic mild hypercalcemia with elevation of PTH and stable serumcreatinine 
over time. Referred for possible serum monoclonal antibody on electrophoresis of
 the serum. Urine negative for monoclonal protein on electrophoresis.  
-Low-level IgG kappa monoclonal gammopathy.  
-Previous hypercalcemia secondary to hyperparathyroidism.  
-Bone survey revealed no lytic lesions.  
-Previous hypercalcemia from hyperparathyroidism.  
-I reviewed the results of the bone marrow biopsy in detail with the patient and
 his daughter. No evidence of smoldering myeloma or multiple myeloma.  
-I discussed the natural history, treated course, and prognosis of MGUS with the
 patient and his daughter.  
Plan:  
-Reassess in 1 year pending results of today's lab work.  
  
(D50.0) Iron deficiency anemia due to chronic blood loss  
(D69.6) Thrombocytopenia (HCC)  
Assessment:  
-He also has macrocytic anemia and thrombocytopenia. Prior reports of 
splenomegaly. I previously personally reviewed CT images from  and . In 
2014, spleen measured up to about 16 cm in craniocaudal dimension. No mention of
 fatty liver or other morphologic changes of the liver to suggest cirrhosis.  
-Ultrasound suggestive of cirrhosis with splenomegaly.  
-Likely some splenic sequestration of platelets.  
-Ferritin was low indicating iron deficiency. Feels much better after parenteral
 iron.  
-He is going to be scheduled with GI for colonoscopy at main [place]. Could not 
be done here.  
Plan:  
-Follow-up with colonoscopy.  
-Repeat CBC and iron studies in about 3 months.  
  
(E21.3) Hyperparathyroidism (HCC)  
Assessment:  
-Serum PTH level elevated.  
Plan:  
-Defer management to PCP.  
  
(K74.69) Other cirrhosis of liver (HCC)  
Assessment:  
-Established with hepatology.  
-Was told needs closed MRI. Open would not give adequate pictures to further 
investigate the potential mass.  
Plan:  
-Follow up with hepatology.  
  
Portions of this documentation were copied and pasted from previous office visit
 notes in order to provide a cohesive continuity of the history. The note has 
been reviewed and edited and updated as necessary.  
  
I spent a total of 40 minutes on the date of the service which included 
preparing to see the patient, face-to-face patient care, completing clinical 
documentation, obtaining and/or reviewing separately obtained history, 
performing a medically appropriate examination, counseling and educating the pat
ient/family/caregiver, communicating with other HCPs (not separately reported), 
independently interpreting results (not separately reported), and communicating 
results to the patient/family/caregiver.  
  
Tello Ortega DO  
Electronically signed by Tello Ortega DO at 2023 10:10 AM EST  
  
documented in this encounterPro[facility]- Miscellaneous Notes* 
  Telephone Encounter - Sarah Calle LPN - 2023 10:03 AM ESTFormatting of 
  this note might be different from the original.  
Faxed office note and message of referral request to Dr Donouhe.  
  
Phone # [phone] fax# [phone]  
  
  
Electronically signed by Sarah Calle LPN at 2023 10:06 AM EST  
  
* Telephone Encounter - Nathalia Bernal PA-C - 2023 8:17 AM ESTFormatting of 
  this note might be different from the original.  
Please fax referral request including my recent office note to Dr. Donohue's 
office for EGD with possible banding of esophageal varices, and colonoscopy for 
anemia and history of colon polyps  
Electronically signed by Nathalia Bernal PA-C at 2023 8:18 AM EST  
  
documented in this encounterProMed[facility]01- Miscellaneous Notes* 
  Telephone Encounter - Marilin Sanchez - 2023 2:48 PM ESTFormatting of this
   note might be different from the original.  
Pt's D2 canceled and added on as a new D10  
Electronically signed by Marilin Sanchez at 2023 2:50 PM EST  
  
* Telephone Encounter - Cherelle Casillas LPN - 2023 1:52 PM ESTFormatting of
   this note might be different from the original.  
Daughter is aware of Dr. Ortega's recommendations. She is afraid that the patient
 is too weak and she will not be able to get him out of the house and into a car
 for urgent care/PCP visit. I instructed her if that was the case to call a 
squad and have patient transported to the ER.  
  
PSS- D2 iron will need rescheduled.  
  
Cherelle Casillas LPN  
  
Electronically signed by Chreelle Casillas LPN at 2023 1:58 PM EST  
  
* Telephone Encounter - Glenna Oneill RN - 2023 1:30 PM ESTFormatting of 
  this note might be different from the original.  
Called daughter, no answer, left a message requesting a call back.  
  
Per Dr. Ortega verbal response, okay to cancel iron today so patient can be 
evaluated elsewhere.  
  
Glenna Oneill RN  
  
I canceled iron infusion for today.  
  
PSS- D2 iron will need rescheduled.  
  
Cherelle Casillas LPN  
  
  
Electronically signed by Cherelle Casillas LPN at 2023 1:57 PM EST  
  
* Telephone Encounter - Tello Ortega DO - 2023 1:17 PM ESTFormatting of 
  this note might be different from the original.  
I don't think this is related to the iron infusions. He may have some sort of 
acute viral infectionso I would encourage her to take him to urgent care or talk
 to PCPs office about an urgent visit.  
  
Tello Ortega DO  
Electronically signed by Tello Ortega DO at 2023 1:27 PM EST  
  
* Telephone Encounter - Glenna Oneill RN - 2023 9:57 AM ESTFormatting of 
  this note might be different from the original.  
Spoke to daughter. Daughter stated on Saturday, patient had c/o diarrhea and 
 extreme fatigue and didn't feel like doing anything.  Yesterday his energy was 
a little bit better, patient was able to get up and move around. Daughter stated
 patients appetite is decreased but she was able to get him toeat chili and 
potato salad yesterday. Yesterday around 3:00 pm, patient fell when he was 
opening his front door to let his dog out. Per daughter, patient has bad 
neuropathy in his feet; left foot is worse,  he drags  his left foot. Daughter 
denies patient hitting his head or dizziness or visual changes related to the 
fall. Today, patients temperature is 99 F, he has chills, and at 6:00 am, 
patient had nausea and 1 episode of  green  emesis. Daughter denies headaches or
 cold symptoms. Daughter denies known sick contacts. Daughter aware this nurse 
will provide Dr. Ortega with an update and will call back with further 
instructions. Daughter stated understanding.  
  
Glenna Oneill RN  
  
Electronically signed by Glenna Oneill RN at 2023 10:10 AM EST  
  
* Telephone Encounter - Awa Olivas - 2023 9:13 AM ESTFormatting of this
   note might be different from the original.  
Patient received iron infusion 23. His daughter calls in stating that the 
patient has been experiencing severe fatigue, nausea, and a slightly elevated 
temperature. Daughter is requesting to be contacted to discuss plan of care. He 
is scheduled to receive another iron treatment today.  
Electronically signed by Awa Olivas at 2023 9:15 AM EST  
  
documented in this encounterPro[facility]01- Miscellaneous Notes* 
  Telephone Encounter - Glenna Oneill RN - 2023 10:34 AM ESTFormatting of 
  this note might be different from the original.  
Addressed in separate phone encounter.  
  
Glenna Oneill RN  
  
Electronically signed by Glenna Oneill RN at 2023 10:34 AM EST  
  
* Telephone Encounter - Cherelle Casillas LPN - 2023 9:17 AM ESTFormatting of
   this note might be different from the original.  
See phone note from 2023.  
  
Cherelle Casillas LPN  
  
Electronically signed by Cherelle Casillas LPN at 2023 9:17 AM EST  
  
documented in this encounterProMedica [facility]01- Instructions* Patient 
  Instructions*   
  
Júnior Ahuja MD - 2023 3:42 PM EST  
  
Formatting of this note might be different from the original.  
DO NOT TAKE FUROSEMIDE WHILE TAKING TORSEMIDE.  
  
START TORSEMIDE TOMORROW.  
  
START ANTIBIOTIC TONIGHT.  
Electronically signed by Júnior Ahuja MD at 2023 3:42 PM EST  
  
  
  
documented in this encounterProMedica [facility]01- History of Present 
illness Narrative* Júnior Ahuja MD - 2023 3:31 PM ESTFormatting of 
  this note is different from the original.  
This note was created using [employer].  
Subjective  
Mitesh Braun is a 83 year old male here with daughter. He developed an itchy
 rash of both legs one week ago and he was applying capsaicin and other creams 
with no improvement.  
  
His blood pressure continued to be low, so most medications were discontinued 
other than [id number] mg every other day. His urinary incontinence was 
progressing, and aside from urge incontinence, he had incontinence without 
awareness.  
  
Review of Systems  
Constitutional: Negative for chills and fever.  
Respiratory: Negative for shortness of breath.  
Cardiovascular: Positive for leg swelling. Negative for chest pain and 
palpitations.  
Genitourinary: Positive for urgency.  
Incontinence.  
  
ACTIVE PROBLEM LIST  
Essential hypertension  
Caren On Cpap  
Esophageal Reflux  
Gout  
Mixed hyperlipidemia  
Impaired Fasting Glucose  
Aortic Valve Disorder  
Polycystic Kidney  
Adenomatous Colon Polyp  
Thrombocytopenia (HCC)  
Cad (Coronary Artery Disease), Native Coronary Artery  
Adjustment Disorder With Depressed Mood  
Asthma Exacerbation  
Peripheral Polyneuropathy  
Splenomegaly  
Ckd (Chronic Kidney Disease) Stage 3, Gfr 30-59 Ml/Min ([facility])  
Abnormality of Gait  
Anemia of Chronic Disease  
Urge Incontinence of Urine  
Obesity, Class II, Bmi 35-39.9  
Frequent Falls  
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  
Iron Malabsorption  
Liver Lesion  
  
Current Outpatient Medications  
Medication Sig  
furosemide (LASIX) 20 mg tablet TAKE 1 TABLET BY MOUTH EVERY OTHER RDAY  
ascorbic acid, vitamin C, (VITAMIN C) 500 mg tablet Take 1 tablet by mouth once 
daily.  
LORazepam (ATIVAN) 1 mg tablet Take 1 tablet by mouth 1 hour before MRI  
ascorbic acid-elderberry fruit 100-50 mg chew Take 2 tablets by mouth once 
daily.  
cyanocobalamin (VITAMIN B-12) 2,500 mcg tablet Take 2,500 mcg by mouth once 
daily.  
melatonin 10 mg tab Take 1 tablet by mouth as needed.  
aspirin, enteric coated (ASPIRIN, ENTERIC COATED) 81 mg EC tablet Take 81 mg by 
mouth once daily.  
albuterol HFA (PROVENTIL HFA, VENTOLIN HFA) 90 mcg/actuation inhaler Inhale 2 
Puffs as instructed every 4 hours as needed for wheezing/shortness of breath.  
omeprazole (PRILOSEC) 40 mg capsule Take 1 capsule by mouth once daily.  
allopurinol (ZYLOPRIM) 300 mg tablet Take 1 tablet by mouth once daily.  
magnesium oxide 400 mg magnesium cap Take 1 capsule by mouth once daily.  
fenofibrate nanocrystallized (TRICOR) 48 mg tablet Take 1 tablet by mouth once 
daily.  
sertraline (ZOLOFT) 50 mg tablet Take 1 tablet by mouth once daily.  
nitroglycerin sublingual (NITROQUICK) 0.4 mg SL tablet Dissolve 1 tablet under 
the tongue every 5 minutes as needed for chest pain. FOR CHEST PAIN. IF NO 
RELIEF CALL 911  
fluticasone (FLONASE) 50 mcg/actuation nasal spray Use 1 Spray in each nostril 
once daily.  
ferrous sulfate 325 mg (65 mg iron) EC tablet Take 1 tablet by mouth twice daily
 with meals. (Patient taking differently: Take 325 mg by mouth once daily.)  
oxybutynin ER (DITROPAN XL) 15 mg 24 hr Extended Rel Tab Take 2 tablets by mouth
 once daily.  
amoxicillin (POLYMOX, AMOXIL) 500 mg capsule Take four tablets, one hour prior 
to dental work (Patient taking differently: Take four tablets by mouth , one 
hour prior to dental work)  
fluticasone-vilanterol (BREO ELLIPTA) 200-25 mcg/dose inhaler Inhale 1 
Inhalation as instructed once daily. Inhale one puff once daily. DO NOT CLICK 
OPEN UNTIL READY FOR DOSE  
multivitamin tablet Take 1 tablet by mouth once daily.  
torsemide (DEMADEX) 20 mg tablet Take 1 tablet by mouth once daily for 5 days.  
doxycycline (VIBRA-TABS) 100 mg tablet Take 1 tablet by mouth twice daily for 7 
days.  
Diaper,Brief, Adult,Disposable (DEPEND EASY FIT UNDERGARMENTS) 1 Each three 
times daily.  
  
No current facility-administered medications for this visit.  
  
  
Objective  
/64 (BP Site: Left Arm, BP Position: Sitting, BP Cuff Size: Large Adult)  
 Pulse 76   Temp 36.2 C (97.2 F) (Temporal)   Resp 16   Wt 111.6 kg (246 lb)   
BMI 38.53 kg/m  
Physical Exam  
Constitutional:  
General: He is not in acute distress.  
Appearance: He is not ill-appearing.  
Cardiovascular:  
Rate and Rhythm: Normal rate and regular rhythm.  
Heart sounds: No murmur heard.  
Pulmonary:  
Effort: No respiratory distress.  
Breath sounds: No wheezing or rales.  
Musculoskeletal:  
General: No tenderness.  
Right lower leg: 3+ Edema present.  
Left lower le+ Edema present.  
Skin:  
Findings: Erythema and rash present.  
Comments: Linear excoriations with erythema on both lower legs, no seepage, no 
ulcers, no drainage,some warmth, right more than left.  
Neurological:  
Mental Status: He is alert.  
Gait: Gait abnormal.  
  
Assessment and Plan  
  
1. Stasis dermatitis of both legs - ICD9: 454.1, ICD10: I87.2 (primary 
diagnosis)  
- TORSEMIDE 20 MG TABLET  
  
2. Local skin infection - ICD9: 686.9, ICD10: L08.9  
- Begin treatment with  
- DOXYCYCLINE HYCLATE 100 MG TABLET  
  
3. Urge incontinence of urine - ICD9: 788.31, ICD10: N39.41  
- DEPEND EASY FIT UNDERGARMENTS MISC  
  
4. Peripheral polyneuropathy - ICD9: 356.9, ICD10: G62.9  
- DEPEND EASY FIT UNDERGARMENTS MISC  
  
Further recommendations will depend on response by next week.  
  
Júnior Ahuja MD  
  
Electronically signed by Júnior Ahuja MD at 2023 4:59 PM EST  
  
documented in this encounterProMedica [facility]01- History of Present 
illness Narrative* Nathalia Bernal PA-C - 2023 1:38 PM ESTFormatting of this 
  note is different from the original.  
HISTORY AND PHYSICAL  
  
Mitesh Braun  
1940  
  
REFERRING PHYSICIAN: Tello Ortega DO  
  
CHIEF COMPLAINT: Consult (colonoscopy)  
  
HPI: The patient is a 83 year old male referred for endoscopy. Mitesh notes 
iron deficiency anemiaand was referred by hem/onc for endoscopic evaluation of 
possible GI source of bleeding. Patient denies any change in bowel habits, 
weight changes, blood in stools, black tarry stools or abdominal pain. Denies 
family history of colon issues. The patient NOTES acid reflux.  
  
Mitesh has undergone prior endoscopy. Last EGD was in 2020 by Dr. Sandoval with findings ofgastritis, esophagitis, hiatal hernia. Last colonoscopy 
18 by Dr. Rios with removal of multiple adenomatous polyps, three year 
follow-up recommended.  
  
Patient's past medical history is significant for cirrhosis for which he follows
 with Gastroenterology. Notes mention possibility of esophageal varices which 
may require banding.  
  
PAST MEDICAL HISTORY  
Diagnosis Date  
Adenomatous colon polyp 8/3/2011  
Adjustment disorder with depressed mood 3/9/2012  
Aortic valve disorders 2007  
stenosis  
Asthma exacerbation 11/10/2012  
Calculus of ureter 2005  
Esophageal reflux 10/14/2011  
Essential hypertension 10/14/2005  
Impaired renal function 4/15/2010  
Impotence of organic origin 2006  
Intestinal polyp 8/3/2011  
Nonspecific abnormal results of liver function study 2006  
Obesity, unspecified  
CAREN on CPAP 10/13/2005  
Other abnormal blood chemistry  
Hyperuricemia  
Other and unspecified hyperlipidemia 10/14/2005  
Peripheral polyneuropathy 2/3/2015  
Polycystic kidney 2010  
Snoring  
Splenomegaly 9/10/2014  
Thrombocytopenia (HCC) 10/5/2011  
  
  
PAST SURGICAL HISTORY  
Procedure Laterality Date  
ARTHRP KNE CONDYLE&PLATU MEDIAL&LAT COMPARTMENTS Left 2017  
BAL. ASSIST ENTEROSCOPY W/ BX 2011  
CAPSULE ENDO SMALL BOWEL W EGD 2011  
COLONOSCOPY FLX DX W/COLLJ SPEC WHEN PFRMD   
Colonoscopy  
COLONOSCOPY FLX DX W/COLLJ SPEC WHEN PFRMD   
Colonoscopy  
COLONOSCOPY FLX DX W/COLLJ SPEC WHEN PFRMD 2011  
Colonoscopy  
COLONOSCOPY FLX DX W/COLLJ SPEC WHEN PFRMD 2018  
Colonoscopy  
COLONOSCOPY W/BIOPSY SINGLE/MULTIPLE 2008  
ESOPHAGOGASTRODUODENOSCOPY TRANSORAL DIAGNOSTIC 2018  
EGD  
ESOPHAGOGASTRODUODENOSCOPY TRANSORAL DIAGNOSTIC 2019  
EGD  
ESOPHAGOGASTRODUODENOSCOPY TRANSORAL DIAGNOSTIC 2020  
EGD  
EXCISION PILONIDAL CYST/SINUS SIMPLE 1964  
HEMORRHOIDECTOMY INTERNAL RUBBER BAND LIGATIONS 2018 and 18  
OPTX FEM SHFT FX W/INSJ IMED IMPLT W/WO SCREW Left 2020  
L hip cephalo medullary nail.  
RPLCMT AORTIC VALVE OPN W/STENTLESS TISSUE VALVE 10/04/2011  
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  
RPR UMBILICAL HRNA 5 YRS/> REDUCIBLE 09/10/2010  
Hernia repair, umbilical >5yr  
  
Current Outpatient Medications  
Medication Sig  
furosemide (LASIX) 20 mg tablet TAKE 1 TABLET BY MOUTH EVERY OTHER RDAY  
atenolol (TENORMIN) 50 mg tablet Take 1 tablet by mouth once daily.  
ascorbic acid, vitamin C, (VITAMIN C) 500 mg tablet Take 1 tablet by mouth once 
daily.  
ascorbic acid-elderberry fruit 100-50 mg chew Take 2 tablets by mouth once 
daily.  
cyanocobalamin (VITAMIN B-12) 2,500 mcg tablet Take 2,500 mcg by mouth once 
daily.  
melatonin 10 mg tab Take 1 tablet by mouth as needed.  
aspirin, enteric coated (ASPIRIN, ENTERIC COATED) 81 mg EC tablet Take 81 mg by 
mouth once daily.  
albuterol HFA (PROVENTIL HFA, VENTOLIN HFA) 90 mcg/actuation inhaler Inhale 2 
Puffs as instructed every 4 hours as needed for wheezing/shortness of breath.  
omeprazole (PRILOSEC) 40 mg capsule Take 1 capsule by mouth once daily.  
allopurinol (ZYLOPRIM) 300 mg tablet Take 1 tablet by mouth once daily.  
magnesium oxide 400 mg magnesium cap Take 1 capsule by mouth once daily.  
fenofibrate nanocrystallized (TRICOR) 48 mg tablet Take 1 tablet by mouth once 
daily.  
sertraline (ZOLOFT) 50 mg tablet Take 1 tablet by mouth once daily.  
nitroglycerin sublingual (NITROQUICK) 0.4 mg SL tablet Dissolve 1 tablet under 
the tongue every 5 minutes as needed for chest pain. FOR CHEST PAIN. IF NO 
RELIEF CALL 911  
fluticasone (FLONASE) 50 mcg/actuation nasal spray Use 1 Spray in each nostril 
once daily.  
ferrous sulfate 325 mg (65 mg iron) EC tablet Take 1 tablet by mouth twice daily
 with meals. (Patient taking differently: Take 325 mg by mouth once daily.)  
oxybutynin ER (DITROPAN XL) 15 mg 24 hr Extended Rel Tab Take 2 tablets by mouth
 once daily.  
amoxicillin (POLYMOX, AMOXIL) 500 mg capsule Take four tablets, one hour prior 
to dental work (Patient taking differently: Take four tablets by mouth , one 
hour prior to dental work)  
betamethasone valerate 0.1 % lotion Apply to affected area twice daily.  
fluticasone-vilanterol (BREO ELLIPTA) 200-25 mcg/dose inhaler Inhale 1 
Inhalation as instructed once daily. Inhale one puff once daily. DO NOT CLICK 
OPEN UNTIL READY FOR DOSE  
multivitamin tablet Take 1 tablet by mouth once daily.  
lisinopril (ZESTRIL, PRINIVIL) 40 mg tablet Take 40 mg by mouth once daily. 
(Patient not taking: Reported on 2023)  
  
No current facility-administered medications for this visit.  
  
ALLERGIES: Bees, Acetaminophen, Hydrocodone, Lipitor [Atorvastatin Calcium], 
Singulair [MontelukastSodium], and Strawberries  
  
PERSONAL HISTORY:  
Social History  
  
Tobacco Use  
Smoking status: Never  
Smokeless tobacco: Never  
Vaping Use  
Vaping Use: Never used  
Substance Use Topics  
Alcohol use: No  
Drug use: No  
  
  
  
FAMILY HISTORY:  
FAMILY HISTORY  
Problem Relation Age of Onset  
Heart Mother  
 in 70's  
Coronary Artery Disease Father  
 in his 80's  
Emphysema Father  
Diabetes Brother  
  
REVIEW OF SYMPTOMS:  
The review of systems data was entered by the nurse and reviewed by me  
  
Nursing Notes:  
Karina Cabrera RN 2023 1:19 PM Signed  
REVIEW OF SYSTEMS:  
General: The patient NOTES fatigue, denies weight loss, denies weight gain, 
denies feeling hot, andNOTES feelings of cold.  
Eyes: The patient denies glaucoma, NOTES eye injury/surgery, wears glasses or 
contacts.  
Ear/Nose/Throat: The patient NOTES allergies, denies hayfever, denies ear 
infections, and denies bloody noses.  
Cardiovascular: The patient denies chest pain, denies heart disease, NOTES high 
blood pressure,denies cardiac stent, denies prior heart attack, denies irregular
heart beat, NOTES high cholesterol, denies poor circulation, NOTESheart failure,
other cardiac issues, denies claudication, denies cold feet, denies peripheral 
arterial stent.  
Respiratory: The patient denies tuberculosis, denies pneumonia, denies frequent 
cough, denies pulmonary embolism, NOTES shortness of breath, and denies coughing
up blood.  
Gastrointestinal: The patient denies difficulty swallowing, NOTES acid reflux, 
denies ulcers, denies vomiting, denies jaundice/hepatitis, denies gallbladder 
problems, denies black or tarry stools, denies hemorrhoids, denies bleeding from
rectum, NOTES diverticulitis, denies constipation, denies diarrhea, denies loss 
of stool control, and denies hernias.  
Kidney/Bladder: The patient denies kidney stones, denies urine infections, and 
denies bloody urine.  
Skin: The patient denies a history of skin cancer, denies bleeding/changing 
moles, and denies a history of skin rash.  
Neurologic: The patient denies a history of epilepsy/convulsions, denies 
headaches, denies head/spinal injuries, and denies stroke/TIA.  
Psychiatric: The patient denies psychiatric medications, denies depression, and 
denies voices, denies substance abuse.  
Endocrine: The patient denies thyroid disorders, denies diabetes, and denies 
hormonal problems.  
Hematologic: The patient denies a history of bruising, denies bleeding, and 
denies anemia, denies blood clots.  
Infections: The patient denies a history of measles and mumps, denies rheumatic 
fever, and denies sexually transmitted diseases.  
Musculoskeletal: The patient denies back pain/injury, denies back problems, 
denies sciatica, NOTES knee/foot trouble, NOTES arthritis, or denies gout.  
  
Last colon   
  
Karina Cabrera RN  
I have confirmed and edited as necessary, the PFSH and ROS obtained by others  
Nathalia Bernal PA-C  
  
  
PHYSICAL EXAMINATION:  
  
General: The patient is 83 year old male, well nourished, well hydrated in no 
acute distress. The patient is oriented to time, place, and person.  
  
VITALS: Blood pressure 140/80, pulse 110, temperature 37.2 C (98.9 F), height 
170.2 cm (5' 7 ), weight 114.3 kg (252 lb), SpO2 94 %. Body mass index is 39.47 
kg/m .  
  
HEENT: Normal cephalic, ataumatic, pupils are equally round, sclera are 
anicteric, mucous membranesare moist, oropharynx is clear. Neck has no masses, 
asymmetry or lymphadenopathy.  
  
Respiratory: Clear to auscultation and percussion. Normal respiratory excursion 
and pattern.  
  
Cardiac: Examination is regular rate and rhythm. Normal S1/S2  
  
Abdominal exam: Soft, nontender, with no palpable masses. No hepatosplenomegaly.
No palpable hernias.  
  
Extremities: no clubbing, cyanosis or edema. No adenopathy.  
  
LABORATORY VALUES: As Noted  
  
RADIOLOGIC STUDIES: As Noted  
  
Assessment  
IMPRESSION: history of colon polyps, anemia, cirrhosis  
  
PLAN: I have reviewed my findings with the surgeon. Patient referred for upper 
and lower endoscopy for evaluation of possible GI bleeding. Patient would 
require MAC due to medical comorbidities. Due to the possibility of esophageal 
varices which may require banding (a procedure not performed by thegeneral 
surgeons), would recommend that patient have his upper and lower endoscopy 
performed by a gastroenterologist who could perform banding at time of endoscopy
if needed based on findings. This was relayed to the patient and his daughter, 
who verbalized understanding.  
  
  
Diagnoses: (D50.9) Iron deficiency anemia, unspecified iron deficiency anemia 
type (primary encounter diagnosis)  
(Z86.010) History of colonic polyps  
(Z87.19) History of cirrhosis  
  
Consultation requested by Dr. Ortega for an opinion regarding anemia. My final 
recommendations will be communicated back to the requesting physician by way of 
shared Medical record or letter to requesting physician via US mail.  
  
_____________________________  
Nathalia Bernal PA-C  
  
Electronically signed by Nathalia Bernal PA-C at 2023 8:16 AM EST  
  
documented in this encounterPro[facility]01- Nurse Note* Karina Cabrera RN - 2023 1:16 PM ESTFormatting of this note might be 
  different from the original.  
REVIEW OF SYSTEMS:  
General: The patient NOTES fatigue, denies weight loss, denies weight gain, 
denies feeling hot, andNOTES feelings of cold.  
Eyes: The patient denies glaucoma, NOTES eye injury/surgery, wears glasses or 
contacts.  
Ear/Nose/Throat: The patient NOTES allergies, denies hayfever, denies ear 
infections, and denies bloody noses.  
Cardiovascular: The patient denies chest pain, denies heart disease, NOTES high 
blood pressure,denies cardiac stent, denies prior heart attack, denies irregular
heart beat, NOTES high cholesterol, denies poor circulation, NOTESheart failure,
other cardiac issues, denies claudication, denies cold feet, denies peripheral 
arterial stent.  
Respiratory: The patient denies tuberculosis, denies pneumonia, denies frequent 
cough, denies pulmonary embolism, NOTES shortness of breath, and denies coughing
up blood.  
Gastrointestinal: The patient denies difficulty swallowing, NOTES acid reflux, 
denies ulcers, denies vomiting, denies jaundice/hepatitis, denies gallbladder 
problems, denies black or tarry stools, denies hemorrhoids, denies bleeding from
rectum, NOTES diverticulitis, denies constipation, denies diarrhea, denies loss 
of stool control, and denies hernias.  
Kidney/Bladder: The patient denies kidney stones, denies urine infections, and 
denies bloody urine.  
Skin: The patient denies a history of skin cancer, denies bleeding/changing 
moles, and denies a history of skin rash.  
Neurologic: The patient denies a history of epilepsy/convulsions, denies 
headaches, denies head/spinal injuries, and denies stroke/TIA.  
Psychiatric: The patient denies psychiatric medications, denies depression, and 
denies voices, denies substance abuse.  
Endocrine: The patient denies thyroid disorders, denies diabetes, and denies 
hormonal problems.  
Hematologic: The patient denies a history of bruising, denies bleeding, and 
denies anemia, denies blood clots.  
Infections: The patient denies a history of measles and mumps, denies rheumatic 
fever, and denies sexually transmitted diseases.  
Musculoskeletal: The patient denies back pain/injury, denies back problems, 
denies sciatica, NOTES knee/foot trouble, NOTES arthritis, or denies gout.  
  
Last colon 2019  
  
Karina Cabrera RN  
Electronically signed by Karina Cabrera RN at 2023 1:19 PM EST  
  
documented in this encounterProMedica [facility]01- Miscellaneous Notes* 
  Telephone Encounter - Zofia Lipscomb - 01/15/2023 2:42 PM ESTFormatting of 
  this note might be different from the original.  
Unable to reach patient's daughter Dixie, who's number is in chart to call, just
rings and no  or voicemail. We can try again at a later time. Also 
patient is scheduled to be in the office later this week 2023, unable to 
determine based on the note below and patient's appointment desk what things 
have been schedule and what still needs scheduled.  
  
Zofia Lipscomb  
  
Electronically signed by Zofia Lipscomb at 01/15/2023 2:45 PM EST  
  
* Telephone Encounter - Marilin Sanchez - 2023 4:58 PM ESTSummary: LANEY 
  23  
  
Formatting of this note is different from the original.  
Check out comments: Labs today.  
MRI Liver when able--patient would like done at [facility]--open MRI.  
Referral to Dr. Peabody for EGD/colonoscopy--possible varices.  
CT guided bone marrow biopsy at [facility] when able. Hold ASA 1 week prior.  
Referral to hepatology for cirrhosis.  
Schedule for 10 doses iron sucrose.  
CBC/CMP/Myeloma labs then OV after completes iron sucrose.  
  
Electronically signed by Marilin Sanchez at 2023 4:59 PM EST  
  
documented in this encounterProMedica [facility]01- History of Present 
illness Narrative* Maurizio Og PA-C - 2023 10:20 AM ESTFormatting of this
  note is different from the original.  
VIRTUAL VISIT FOLLOW UP  
  
I had a virtual visit with Mr. Braun today for follow up of cirrhosis.  
  
UPDATED HISTORY:  
CC: Patient presents with:  
Cirrhosis  
  
HPI: Mr. Braun is a 83 year old with past medical history of colon polyps, 
diverticulosis, internal hemorrhoids gastric ulcer , Esophageal reflux, obesity,
HLP, aortic valve disorder, polycystic kidney, CAREN on CPAP, thrombocytopenia, 
splenomegaly, hypertension, presenting today for evaluation of cirrhosis.  
Patient was last seen by GI CNP Radha Kong 2021.  
Here at request of Dr. Tello Ortega  
Daughter present during the visit  
  
At last visit:  
Upon chart review, had an abdominal CT in  with findings of fatty liver but 
no splenomegaly  
Most recent RUQ US showed a nodular contour of the liver with splenomegaly and a
new hyperechoic nodule in the right lobe of the liver  
An MRI liver w/wo cont has been ordered but not scheduled  
Last INR 2022 was wnl 1.1  
He's had thrombocytopenia since   
Per Dr. Ortega:  
He also has macrocytic anemia and thrombocytopenia. Prior reports of 
splenomegaly. I personally reviewed CT images from  and . In , 
spleen measured up to about 16 cm in craniocaudal dimension. No mention of fatty
liver or other morphologic changes of the liver to suggest cirrhosis.  
  
Interval hx:  
Believes he was told he had liver lesions previously, which were benign.  
He has never been told he has a fatty liver or cirrhosis until recent testing  
May be going for bone marrow biopsy  
Has never had a liver biopsy  
Does fit the metabolic syndrome  
No alcohol use now, never a big drinker  
Has leg swelling, usually progressive as the day goes on, better upon waking, 
sometimes left swellsmore than right  
Eating better now with meals on wheels, likes soup and salads  
Daughter inquiring if he could have hepatic encephalopathy, has memory issues  
  
Risk Factors for Liver Disease:  
1. Blood transfusions before : yes  
2. IVDA: No  
3. Intranasal coccaine use: No  
4. Tattoos: No  
5.  Service: yes, [employer] (received vaccines)  
6. High risk sexual behavior: No  
7. Alcohol: none in older age, only beer in younger years-occasionally socially  
8. Obesity: yes  
9. Hyperlipidemia: yes  
10. Prolonged exposure to hepatotoxic meds: No  
11. Other autoimmune disorders No  
No daily tylenol  
OTC: multivitamin, elderberry gummies, vitamin c, vitamin b12  
  
Metabolic Syndrome Risk factors:   
1) Diabetes/ Abnormal FBS >100mg/dL:yes  
2) Hypertension : yes  
3)Triglycerides more then 150 : yes  
4) HDL (<50 female and <40 male): yes  
5) Central obesity ( Waist >102 men and >88 female) - Body mass index is 38.89 
kg/(m^2).  
Weight is relatively stable, lost weight around 60 pounds when wife passed away  
Appetite is better, on meals on wheels  
  
Complications of Cirrhosis:  
1. Ascites: No  
2. SBP: No  
3. Non-bleeding varices: No  
4. Variceal hemorrage: No  
5. Portosystemic encephalopathy: No  
6. Hepatorenal Syndrome: No  
7. Hepatopulmonary Syndrome: No  
8. Hepatic hydrothorax: No  
9. Recurrent Cholangitis (PSC): No  
  
Family hx: no cirrhosis, liver cancer  
Surgical hx: hip replacement  
  
Denies current issues with ascites, HE, hematemesis, hematochezia, confusion, 
dark urine, josselyn colored stool, nausea, vomiting, weight loss, early satiety, 
bloating, dysphagia, odynophagia, change inbm. Remaining systems reviewed and 
are negative.  
  
MELD-Na score: 7 at 2022 4:21 PM  
MELD score: 7 at 2022 4:21 PM  
Calculated from:  
Serum Creatinine: 1.01 mg/dL at 2022 4:21 PM  
Serum Sodium: 135 mmol/L at 2022 4:21 PM  
Total Bilirubin: 0.5 mg/dL (Using min of 1 mg/dL) at 2022 4:21 PM  
INR(ratio): 1.1 at 2022 4:21 PM  
Age: 82 years  
  
PAST MEDICAL HISTORY  
Diagnosis Date  
Adenomatous colon polyp 8/3/2011  
Adjustment disorder with depressed mood 3/9/2012  
Aortic valve disorders 2007  
stenosis  
Asthma exacerbation 11/10/2012  
Calculus of ureter 2005  
Esophageal reflux 10/14/2011  
Essential hypertension 10/14/2005  
Impaired renal function 4/15/2010  
Impotence of organic origin 2006  
Intestinal polyp 8/3/2011  
Nonspecific abnormal results of liver function study 2006  
Obesity, unspecified  
CAREN on CPAP 10/13/2005  
Other abnormal blood chemistry  
Hyperuricemia  
Other and unspecified hyperlipidemia 10/14/2005  
Peripheral polyneuropathy 2/3/2015  
Polycystic kidney 2010  
Snoring  
Splenomegaly 9/10/2014  
Thrombocytopenia (HCC) 10/5/2011  
  
PAST SURGICAL HISTORY  
Procedure Laterality Date  
ARTHRP KNE CONDYLE&PLATU MEDIAL&LAT COMPARTMENTS Left 2017  
BAL. ASSIST ENTEROSCOPY W/ BX 2011  
CAPSULE ENDO SMALL BOWEL W EGD 2011  
COLONOSCOPY FLX DX W/COLLJ SPEC WHEN PFRMD   
Colonoscopy  
COLONOSCOPY FLX DX W/COLLJ SPEC WHEN PFRMD   
Colonoscopy  
COLONOSCOPY FLX DX W/COLLJ SPEC WHEN PFRMD 2011  
Colonoscopy  
COLONOSCOPY FLX DX W/COLLJ SPEC WHEN PFRMD 2018  
Colonoscopy  
COLONOSCOPY W/BIOPSY SINGLE/MULTIPLE 2008  
ESOPHAGOGASTRODUODENOSCOPY TRANSORAL DIAGNOSTIC 2018  
EGD  
ESOPHAGOGASTRODUODENOSCOPY TRANSORAL DIAGNOSTIC 2019  
EGD  
ESOPHAGOGASTRODUODENOSCOPY TRANSORAL DIAGNOSTIC 2020  
EGD  
EXCISION PILONIDAL CYST/SINUS SIMPLE 1964  
HEMORRHOIDECTOMY INTERNAL RUBBER BAND LIGATIONS 2018 and 18  
OPTX FEM SHFT FX W/INSJ IMED IMPLT W/WO SCREW Left 2020  
L hip cephalo medullary nail.  
RPLCMT AORTIC VALVE OPN W/STENTLESS TISSUE VALVE 10/04/2011  
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  
RPR UMBILICAL HRNA 5 YRS/> REDUCIBLE 09/10/2010  
Hernia repair, umbilical >5yr  
  
FAMILY HISTORY  
Problem Relation Age of Onset  
Heart Mother  
 in 70's  
Coronary Artery Disease Father  
 in his 80's  
Emphysema Father  
Diabetes Brother  
  
Social History  
  
Tobacco Use  
Smoking status: Never  
Smokeless tobacco: Never  
Vaping Use  
Vaping Use: Never used  
Substance Use Topics  
Alcohol use: No  
Drug use: No  
  
Current Outpatient Medications  
Medication Sig Dispense Refill  
iv contrast (will be provided with radiology test) MRI Liver Inject, 
intravenously, once for 1 dose. No IV access, insert saline lock prior to the 
beginning of sedation, infusion, injection of imaging exam. Discontinue saline 
lock post exam. If Pt. has a central line or IVAD, may access for administration
according to line specific nursing protocol. Once exam is complete flush line 
and de-access according to line specific nursing protocol in the MR contrast 
administration guidelines link. 1 Each 0  
ascorbic acid-elderberry fruit 100-50 mg chew Take 2 tablets by mouth once 
daily.  
cyanocobalamin (VITAMIN B-12) 2,500 mcg tablet Take 2,500 mcg by mouth once 
daily.  
melatonin 10 mg tab Take 1 tablet by mouth as needed.  
aspirin, enteric coated (ASPIRIN, ENTERIC COATED) 81 mg EC tablet Take 81 mg by 
mouth once daily.  
albuterol HFA (PROVENTIL HFA, VENTOLIN HFA) 90 mcg/actuation inhaler Inhale 2 
Puffs as instructed every 4 hours as needed for wheezing/shortness of breath.  
omeprazole (PRILOSEC) 40 mg capsule Take 1 capsule by mouth once daily. 90 
capsule 3  
allopurinol (ZYLOPRIM) 300 mg tablet Take 1 tablet by mouth once daily. 90 
tablet 3  
magnesium oxide 400 mg magnesium cap Take 1 capsule by mouth once daily. 90 
capsule 3  
fenofibrate nanocrystallized (TRICOR) 48 mg tablet Take 1 tablet by mouth once 
daily. 90 tablet 3  
sertraline (ZOLOFT) 50 mg tablet Take 1 tablet by mouth once daily. 90 tablet 3  
nitroglycerin sublingual (NITROQUICK) 0.4 mg SL tablet Dissolve 1 tablet under 
the tongue every 5 minutes as needed for chest pain. FOR CHEST PAIN. IF NO 
RELIEF CALL 911 25 tablet 1  
fluticasone (FLONASE) 50 mcg/actuation nasal spray Use 1 Spray in each nostril 
once daily. 3 Each 3  
ferrous sulfate 325 mg (65 mg iron) EC tablet Take 1 tablet by mouth twice daily
with meals. (Patient taking differently: Take 325 mg by mouth once daily.) 180 
tablet 1  
oxybutynin ER (DITROPAN XL) 15 mg 24 hr Extended Rel Tab Take 2 tablets by mouth
once daily. 180 tablet 3  
amoxicillin (POLYMOX, AMOXIL) 500 mg capsule Take four tablets, one hour prior 
to dental work (Patient taking differently: Take four tablets by mouth , one 
hour prior to dental work) 4 capsule 1  
betamethasone valerate 0.1 % lotion Apply to affected area twice daily. 60 mL 0  
fluticasone-vilanterol (BREO ELLIPTA) 200-25 mcg/dose inhaler Inhale 1 
Inhalation as instructed once daily. Inhale one puff once daily. DO NOT CLICK 
OPEN UNTIL READY FOR DOSE 0  
multivitamin tablet Take 1 tablet by mouth once daily. 0  
  
No current facility-administered medications for this visit.  
  
ALLERGIES  
Allergen Reactions  
Bees Anaphylaxis  
Acetaminophen Other: See Comments  
Hydrocodone GI Upset  
Lipitor [Atorvastat* Itching  
Singulair [[place]* Itching  
Strawberries Hives  
hives  
  
REVIEW OF SYSTEMS:  
PAIN ASSESSMENT: Negative for pain, history of chronic pain, or current 
treatment for a chronic pain condition.  
  
GENERAL: No weight loss, malaise or fevers  
RESPIRATORY: Negative for cough, hemoptysis, wheezing, COPD, dyspnea or 
shortness of breath  
CARDIOVASCULAR: Negative for chest pain, leg swelling, CHF or palpitations  
GI: No nausea, vomiting, or diarrhea  
: Not reviewed  
GYN: Not reviewed  
  
PHYSICAL FINDINGS OF NOTE:  
General - Normal, healthy, cooperative, in no acute distress  
Able to interact verbally by video conference  
Psych - ORIENTATION: normal to time place, person and situation  
Mood/Affect: AFFECT AND MOOD: Normal  
Head/Neuro - Normal size and shape Facial appearance normal  
Pulmonary - respiratory effort normal  
Cardiovascular - patient describes extremities normal and warm  
Abdominal - Not performed  
Skin - abnormal lesions not visualized  
Motor - patient seen sitting with Normal appearing strength and coordination  
  
REVIEWED ITEMS  
RUQ  2022:  
IMPRESSION:  
1. There is a new hyperechoic nodule in the right lobe of the liver  
which could be further evaluated by MRI  
2. Nodular contour of the liver which may be due to cirrhosis  
3. Splenomegaly  
  
EGD 2020  
Findings:  
The examined jejunum was normal.  
The examined duodenum was normal.  
Localized moderate inflammation characterized by erosions, erythema  
and friability was found in the gastric antrum. Biopsies were taken  
with a cold forceps for histology.  
A medium-sized hiatal hernia was present.  
Non-severe esophagitis with no bleeding was found.  
Impression: - Normal examined jejunum.  
- Normal examined duodenum.  
- Gastritis. Biopsied.  
- Medium-sized hiatal hernia.  
- Non-severe reflux esophagitis.  
Recommendation: - Discharge patient to home.  
- Resume previous diet.  
- Continue present medications.  
- Return to physician assistant in 1 week.  
  
Colonoscopy 2018:  
Findings:  
Four sessile polyps were found in the transverse colon. The polyps  
were 6 to 9 mm in size. These polyps were removed with a cold snare.  
Resection and retrieval were complete. Estimated blood loss was  
minimal. To prevent bleeding post-intervention, two hemostatic clips  
were successfully placed.  
Impression: - Four 6 to 9 mm polyps in the transverse colon,  
removed with a cold snare. Resected and retrieved.  
Clips were placed.  
Recommendation: - Discharge patient to home.  
- Written discharge instructions were provided to  
the patient.  
- Resume previous diet.  
- Await pathology results.  
- Repeat colonoscopy in 3 years for surveillance.  
- Patient has a contact number available for  
emergencies. The signs and symptoms of potential  
delayed complications were discussed with the  
patient. Return to normal activities tomorrow.  
Written discharge instructions were provided to the  
patient.  
  
IMPRESSION  
Mr. Braun is a 83 year old with past medical history of colon polyps, 
diverticulosis, internal hemorrhoids gastric ulcer , Esophageal reflux, obesity,
 HLP, aortic valve disorder, polycystic kidney, CAREN on CPAP, thrombocytopenia, 
splenomegaly, hypertension, presenting today for evaluation of cirrhosis. Noted 
to have a cirrhotic appearing liver on recent RUQ US, has had thrombocytopenia 
for years,will be undergoing bone marrow biopsy. He fits the metabolic syndrome,
 placing him at risk for CÁRDENAS/NAFLD with progression to cirrhosis. Discussed 
definitive diagnosis involves a transjugular liver biopsy but he agreed to defer
 such an invasive procedure. Will monitor him as if he has cirrhosis so as to 
stay on top of potential complications, of which he has none at present, leg 
swelling is likely cardiac in nature. Discussed pathophysiology of cirrhosis 
along with signs of decompensation.  
  
RECOMMENDATION:  
MELD labs with chronic liver dx panel  
Liver lesion/HCC Screen: MRI liver w/wo cont now (may need general anesthesia at
 main campus vs lorazepam 1 hour before MRI, will prescribe if needed, once 
scheduled). Discussed HCC Screening needed every 6 months  
Portal HTN/EV screen: EGD now, has upcoming apt to discuss EGD/colonoscopy, will
 let me know results of that visit and I will prescribe EGD if needed, may need 
variceal banding  
HE: none, discussed trial of lactulose for goal of ~3bm daily, will let me know 
if interested  
LE edema/ Ascites: none, diuretics as prescribed. Discussed <2,000 mg sodium 
diet  
Continue abstinence from alcohol, no issue for him  
Screening colonoscopy: to be discussed at upcoming visit  
HCM/Immunizations: check immunity for HAV/HBV And vaccine if needed  
Metabolic syndrome: tight control essential, to be monitored/treated by PCP. 
Weight loss 5-10% bodyweight helpful. Mediterranean diet/increased 
cardiovascular exercise with goal >45 minutes 5 days weekly  
Avoid liver detox cleanse/supplements  
Not to exceed 2,000 mg tylenol daily  
  
Follow up once blood work, EGD and liver MRI are completed, can be virtual.  
  
I spent a total of 45 minutes on the date of the service which included 
preparing to see the patient, face-to-face patient care, completing clinical 
documentation, obtaining and/or reviewing separately obtained history, 
performing a medically appropriate examination, counseling and educating the pat
ient/family/caregiver, ordering medications, tests, or procedures, communicating
 with other HCPs (not separately reported), and communicating results to the 
patient/family/caregiver.  
  
Maurizio Og PA-C  
2023 11:17 AM  
  
  
  
Electronically signed by Maurizio Og PA-C at 2023 11:20 AM EST  
  
documented in this encounterProMedica [facility]12- History of Present 
illness Narrative* Siria Daly, SHAHIDMS - 2022 9:15 AM ESTFormatting of 
  this note might be different from the original.  
Radiology Service Progress Note  
  
PATIENT NAME: Mitesh Braun  
MRN: [id number]  
  
DATE OF SERVICE: 2022  
TIME: 10:00 AM  
PATIENT IDENTITY VERIFICATION COMPLETED USING TWO (2) IDENTIFIERS: Name and Date
 of Birth confirmedby patient verbally.  
FALL SCREENING: Has the patient had 2 falls in the last year or 1 fall with 
injury or currently using an Ambulatory Assistive Device (Walker, Cane, 
Wheelchair, Crutches, etc.)? Yes, Patient High Riskfor Falls  
What interventions were put in place to prevent falls during this visit? 
Instructed Patient to Callfor Help if Needed, Offered Assistance with 
Transfers/Clothing, Instructed Patient to Remain Seated(Not on Exam Table) Until
 Exam, and Increased Observations by Caregivers  
PATIENT GENDER DATA: Male  
PATIENT RELEVANT IMPLANT DATA REVIEWED: Not Applicable  
  
RADIOLOGY DEPARTMENT: Ultrasound  
  
PERIPHERAL IV DATA: Not applicable  
  
SIGNED BY: Siria Daly RDMS RVT  
2022 10:00 AM  
  
  
Electronically signed by Siria Daly RDMS at 2022 10:00 AM EST  
  
documented in this encounterProMedica [facility]12- History of Present 
illness Narrative* Tello Ortega,  - 2022 3:17 PM ESTFormatting of this 
  note is different from the original.  
Patient referred by SAMIA Zurita for anemia. The impression and plan 
will be communicated by way of the shared electronic record or faxed under 
separate cover letter.  
  
HPI: The patient is an 81 yo male with a PMH significant for HTN, polycystic 
kidney disease (seeingnephrology for ~15 years), HLD, aortic repair (bovine 
valve), CAD, CAREN (CPAP), asthma, hypercalcemia, splenomegaly (noted on CT chest 
2014; spleen up to 18.4 cm in AP dimension; not enlarged on CT enterography 
) and obesity.  
  
He had lab work ordered at his nephrologist office. Protein electrophoresis of 
the urine demonstrated no evidence of monoclonal spike. Urine protein creatinine
 ratio was elevated. Chemistry panel showed a creatinine of 1.33 mg/dL. Calcium 
mildly elevated 10.6 mg/dL. Vitamin D39.7 PTH 93.4 CBC significant for a total 
white count of 4900. No differential performed. Hemoglobin 9.0 g/dL. . 
Platelet count 79,000. Protein electrophoresis of the serum revealed a M spike 
but no immunofixation was performed.  
  
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 Worse over time.
 He's never been given an answer as to why.  
  
Balance is off and he uses wheeled walker.  
  
[place]. Has home health aides for cleaning and washing 
clothes [facility]. Daughter lives close by and he gets meals on wheels.  
  
Was having left posterior hip pain and got relief with Absorsene Jr.  
  
PAST MEDICAL HISTORY  
Diagnosis Date  
Adenomatous colon polyp 8/3/2011  
Adjustment disorder with depressed mood 3/9/2012  
Aortic valve disorders 2007  
stenosis  
Asthma exacerbation 11/10/2012  
Calculus of ureter 2005  
Esophageal reflux 10/14/2011  
Essential hypertension 10/14/2005  
Impaired renal function 4/15/2010  
Impotence of organic origin 2006  
Intestinal polyp 8/3/2011  
Nonspecific abnormal results of liver function study 2006  
Obesity, unspecified  
CAREN on CPAP 10/13/2005  
Other abnormal blood chemistry  
Hyperuricemia  
Other and unspecified hyperlipidemia 10/14/2005  
Peripheral polyneuropathy 2/3/2015  
Polycystic kidney 2010  
Snoring  
Splenomegaly 9/10/2014  
Thrombocytopenia (HCC) 10/5/2011  
  
PAST SURGICAL HISTORY  
Procedure Laterality Date  
ARTHRP KNE CONDYLE&PLATU MEDIAL&LAT COMPARTMENTS Left 2017  
BAL. ASSIST ENTEROSCOPY W/ BX 2011  
CAPSULE ENDO SMALL BOWEL W EGD 2011  
COLONOSCOPY FLX DX W/COLLJ SPEC WHEN PFRMD   
Colonoscopy  
COLONOSCOPY FLX DX W/COLLJ SPEC WHEN PFRMD   
Colonoscopy  
COLONOSCOPY FLX DX W/COLLJ SPEC WHEN PFRMD 2011  
Colonoscopy  
COLONOSCOPY FLX DX W/COLLJ SPEC WHEN PFRMD 2018  
Colonoscopy  
COLONOSCOPY W/BIOPSY SINGLE/MULTIPLE 2008  
ESOPHAGOGASTRODUODENOSCOPY TRANSORAL DIAGNOSTIC 2018  
EGD  
ESOPHAGOGASTRODUODENOSCOPY TRANSORAL DIAGNOSTIC 2019  
EGD  
ESOPHAGOGASTRODUODENOSCOPY TRANSORAL DIAGNOSTIC 2020  
EGD  
EXCISION PILONIDAL CYST/SINUS SIMPLE 1964  
HEMORRHOIDECTOMY INTERNAL RUBBER BAND LIGATIONS 2018 and 18  
OPTX FEM SHFT FX W/INSJ IMED IMPLT W/WO SCREW Left 2020  
L hip cephalo medullary nail.  
RPLCMT AORTIC VALVE OPN W/STENTLESS TISSUE VALVE 10/04/2011  
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  
RPR UMBILICAL HRNA 5 YRS/> REDUCIBLE 09/10/2010  
Hernia repair, umbilical >5yr  
  
ALLERGIES  
Allergen Reactions  
Bees Anaphylaxis  
Acetaminophen Other: See Comments  
Hydrocodone GI Upset  
Lipitor [Atorvastat* Itching  
Singulair [Monteluk* Itching  
Strawberries Hives  
hives  
  
Current Outpatient Medications  
Medication Sig  
ascorbic acid-elderberry fruit 100-50 mg chew Take 2 tablets by mouth once 
daily.  
cyanocobalamin (VITAMIN B-12) 2,500 mcg tablet Take 2,500 mcg by mouth once 
daily.  
melatonin 10 mg tab Take 1 tablet by mouth as needed.  
aspirin, enteric coated (ASPIRIN, ENTERIC COATED) 81 mg EC tablet Take 81 mg by 
mouth once daily.  
albuterol HFA (PROVENTIL HFA, VENTOLIN HFA) 90 mcg/actuation inhaler Inhale 2 
Puffs as instructed every 4 hours as needed for wheezing/shortness of breath.  
omeprazole (PRILOSEC) 40 mg capsule Take 1 capsule by mouth once daily.  
allopurinol (ZYLOPRIM) 300 mg tablet Take 1 tablet by mouth once daily.  
magnesium oxide 400 mg magnesium cap Take 1 capsule by mouth once daily.  
fenofibrate nanocrystallized (TRICOR) 48 mg tablet Take 1 tablet by mouth once 
daily.  
sertraline (ZOLOFT) 50 mg tablet Take 1 tablet by mouth once daily.  
nitroglycerin sublingual (NITROQUICK) 0.4 mg SL tablet Dissolve 1 tablet under 
the tongue every 5 minutes as needed for chest pain. FOR CHEST PAIN. IF NO 
RELIEF CALL 911  
fluticasone (FLONASE) 50 mcg/actuation nasal spray Use 1 Spray in each nostril 
once daily.  
ferrous sulfate 325 mg (65 mg iron) EC tablet Take 1 tablet by mouth twice daily
 with meals.  
oxybutynin ER (DITROPAN XL) 15 mg 24 hr Extended Rel Tab Take 2 tablets by mouth
 once daily.  
amoxicillin (POLYMOX, AMOXIL) 500 mg capsule Take four tablets, one hour prior 
to dental work (Patient taking differently: Take four tablets by mouth , one 
hour prior to dental work)  
fluticasone-vilanterol (BREO ELLIPTA) 200-25 mcg/dose inhaler Inhale 1 
Inhalation as instructed once daily. Inhale one puff once daily. DO NOT CLICK 
OPEN UNTIL READY FOR DOSE  
multivitamin tablet Take 1 tablet by mouth once daily.  
betamethasone valerate 0.1 % lotion Apply to affected area twice daily.  
  
No current facility-administered medications for this visit.  
  
Social History  
  
Tobacco Use  
Smoking status: Never  
Smokeless tobacco: Never  
Substance Use Topics  
Alcohol use: No  
Drug use: No  
  
ROS:  
Constitutional: Denies episodes of fever and night sweats.  
Neuro: See above. Believes he may have recently had a stroke. He has mild left 
hemiparesis.  
HEENT: No recent change in voice, vision or hearing.  
Resp: Denies cough, wheeze and hemoptysis. Denies shortness of breath at rest.  
CVS: Denies exertional chest pain, PND, orthopnea. Chronic bilateral lower 
extremity swelling.  
GI: No abdominal pain. Bowels move twice a day. Formed stools. Black from iron. 
No blood observed..  
: Denies dysuria or gross hematuria.  
Endo: Denies hot flashes. Denies polyuria and polydipsia. Denies heat and cold 
intolerance.  
Musculoskeletal: No musculoskeletal pain other than left hip pain as outlined 
above.  
Derm: Denies rash. Denies jaundice and diffuse pruritis.  
Heme: Denies unusual bleeding and unexplained bruising.  
Psych: Normal mood.  
  
PHYSICAL EXAM:  
Vitals: Blood pressure 134/81, pulse 93, temperature 36.7 C (98 F), temperature 
source Temporal, weight 110.9 kg (244 lb 8 oz).  
Well-appearing and in no acute distress.  
EYES: Sclerae are anicteric bilaterally.  
ENT: Oral mucosa is unremarkable. There is no sign of thrush or mucositis.  
LYMPHATIC: There is no palpable cervical, supraclavicular or axillary 
adenopathy.  
RESPIRATORY: Inspiratory breath sounds are of normal intensity in all fields. No
 rales, wheezes or rhonchi.  
CARDIOVASCULAR: Rhythm is regular.  
ABDOMEN: The abdomen is nondistended. No organomegaly. No tenderness.  
Extremities: Bilateral lower extremity swelling. Right somewhat worse than left.
 Overlying pitting.  
SKIN: No jaundice.  
NEUROLOGIC: CNs II-XII are grossly intact. He is not able to step up on the exam
 table on his own. Needs at least a 1 person assist. May have some mild left leg
 weakness but due to his general overall weakness difficult to get a good 
neurologic exam.  
  
ASSESSMENT/PLAN:  
(D47.2) Monoclonal gammopathy (primary encounter diagnosis)  
(D53.9) Macrocytic anemia  
(D69.6) Thrombocytopenia (HCC)  
Assessment:  
-The patient is an 82-year-old male with a past medical history as outlined 
above. He has no history of diabetes but has longstanding worsening sensory 
neuropathy of the feet and left hand. He has polycystic kidney disease as well 
as chronic mild hypercalcemia with elevation of PTH and stable serumcreatinine 
over time. Recently found to have possible serum monoclonal antibody on 
electrophoresis of the serum. Urine negative for monoclonal protein on 
electrophoresis.  
-He also has macrocytic anemia and thrombocytopenia. Prior reports of 
splenomegaly. I personally reviewed CT images from  and . In 2014, 
spleen measured up to about 16 cm in craniocaudal dimension. No mention of fatty
 liver or other morphologic changes of the liver to suggest cirrhosis.  
-I discussed the broad differential of the anemia and thrombocytopenia with the 
patient and his daughter. We also discussed the spectrum of plasma cell 
disorders and the further work-up indicated to accurately diagnose.  
Plan:  
-CBC with staff review.  
-Assess TSH, B12, RBC folate, serum copper and cold agglutinins.  
-Assess serum viscosity.  
-Ultrasound liver and spleen.  
-Repeat protein electrophoresis of serum including immunofixation.  
-24-hour urine collection for protein electrophoresis and immunofixation.  
-Check iron studies in light of his chronic kidney disease.  
-Check coagulation times and fibrinogen rule out low-level DIC is a potential 
cause of thrombocytopenia.  
-OV after above resulted.  
-May need EMG/NCV as part of the work-up depending on results above.  
  
I spent a total of 75 minutes on the date of the service which included 
preparing to see the patient, face-to-face patient care, completing clinical 
documentation, obtaining and/or reviewing separately obtained history, 
performing a medically appropriate examination, counseling and educating the pat
ient/family/caregiver, ordering medications, tests, or procedures, communicating
 with other HCPs (not separately reported), independently interpreting results 
(not separately reported), communicatingresults to the patient/family/caregiver,
 and care coordination (not separately reported).  
  
Tello Ortega DO  
Electronically signed by Tello Ortega DO at 2022 4:00 PM EST  
  
[facility]* 
  Telephone Encounter - Chinyere Oliver LPN - 2022 10:01 AM ESTFormatting of
   this note might be different from the original.  
pcp reviewed the rehabilitation and sport therapy note pt was seen there for 
driving assessment.  
Pcp signed form. This has been faxed back to [facility] at [phone]  
  
Electronically signed by hCinyere Oliver LPN at 2022 10:07 AM EST  
  
documented in this encounterPro[facility]11- NoteHNO ID: [id number]  
Author: Ade Vazquez, OT/L  
Service: ?  
Author Type: Occupational Therapist  
Type: Progress Notes  
Filed: 2022 3:31 PM  
Note Text:  
Episode Visit Count: 1  
Start of Care Date: 22  
Onset Date: (R CVA suspected per recent neurologist visit while MRI  
ordered; diagnosis also of peripheral polyneuropathy)  
Patient Identified by Name and Date of Birth: Yes  
REHABILITATION AND SPORTS THERAPY  
OCCUPATIONAL THERAPY INSTRUMENTAL ADL AND COMMUNITY MOBILITY EVALUATION  
SUBJECTIVE: Mitesh Braun is a 82 year old male seen today for OT  
functional mobility assessment due to diagnosis of peripheral  
polyneuropathy while also suspected R CVA. His daughter was with him and  
provided helpful information throughout. She shared that he has taken  
some significant falls earlier this year and that he had aide assist for  
some time but increased it to 4 days/week after his fall in August outside  
then again 1 week later. More recent fall 3 weeks ago while at high risk  
for falling. She shared that his wife passed away 2020 so he  
has been living on his own since they but she has increased her  
involvement and support in the time since.  
Functional Limitations: walking in the community;walking;bending;heavy  
exertion;stair negotiation;driving  
Prior Level of Function: Required assistance  
Home Environment  
Patient Lives With: Self/Alone  
Assistance Available: Part-Time;Home Aide (daughter involved living around  
the corner from patient and seeing him/checking in frequently)  
Home Type: Multi-Level  
Transportation: Travels as a passenger;[facility]  
Patient Goals: to return to driving  
Intake Information: Prescription present  
Falls Interview: Two or more falls in the last year  
Relevant History  
Past Relevant Medical Conditions: Arthritis;Cerebral Vascular  
Accident;Falls;Hypertension;Depression;Neuropathy  
Highest Level of Education: Trade School  
Preferred Language: English  
Right or Left Handed: Right  
Employment: Retired (was )  
Recreation / Current Exercise: no current exercise  
Hobbies / Interests: reads devotionals and cookbooks; rather sedentary  
Home Environment  
Patient Lives With: Self/Alone  
Assistance Available: Part-Time;Home Aide (daughter involved living around  
the corner from patient and seeing him/checking in frequently)  
Home Type: Multi-Level  
Transportation: Travels as a passenger;SUV  
Activities of Daily Living: Modified Independent and supervision for  
showering  
Instrumental Activities of Daily Living: assist for most tasks in the  
home while some light meal preparation on his own; does take his own  
medication but his daughter sets up as well as manages the finances which  
she has been doing since his wife passed away  
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  
State: [place] License/Permit #: [id number] Expires: 25  
Restrictions: corrective lenses; intrastate CDL  
5 Yr. Violation HX: no 5 Yr. MVA HX: no  
Handicap Parking Placard: YES  
_____________________________________________________________________  
1. Mitesh Braun self report indicates an awareness of: Weakness,  
incoordination, or limited motion in L arm and B legs  
Decreased balance  
Tingling or Numbness in: B hands and feet  
Forgetting new information  
2. Mitesh Braun expressed confidence regarding driving when driving  
alone and on familiar road ways.  
3. Mitesh Braun expressed concerns regarding driving at  
night/decreased light conditions, in congested traffic, on the interstate,  
and on long trips.  
OBJECTIVE MEASURES WITH LEVEL OF FUNCTION:  
SENSORIMOTOR ASSESSMENT:  
Hand dominance: Right  
Level of Function Relevant to I ADL, Community Mobility, and Driving:  
Right UE: Sufficient  
Left UE: Marginal while significant contractures/tone in L hand  
: functional for R hand while decreased for L hand  
Right LE: Marginal  
Left LE: Sufficient  
Sitting Balance: Sufficient  
Head / Neck: decreased neck ROM including rotation to right  
Ambulation: Insufficient while requires rollator to maximize safety with  
repeated history of falls in last several months with increased  
vulnerability if out in the community on own  
Transfers: Marginal  
Loading of Device: NA while did not observe him trying to fold/stow his  
rollator while decreased balance issues  
ASSESSMENT OF SENSORIMOTOR FUNCTION: Marginal for Driving  
_____________________________________________________________________  
VISION SCREENING:  
Vision  
Vision Deficits: Wears corrective lenses  
Corrective lenses: bifocals with correction for close up only; does not  
wear always; has had B cataract surgery in past; reports having floaters  
in both eyes  
Corrective Lenses:  
Wears glasses / contact lenses for driving  
Distant Acuity:  
Binocular: 20 / 40  
Nighttime Glare:  
Right: 20 / 40-1  
Left: 20 / 50-1  
Color Perception: pass  
Depth Perception: Pass  
Contrast Sensitivity: mini (more content not included)...[facility]
2022 History of Present illness Narrative* Ade Vazquez, OT/L - 
  2022 4:50 PM ESTFormatting of this note might be different from the 
  original.  
Episode Visit Count: 1  
Start of Care Date: 22  
Onset Date: (R CVA suspected per recent neurologist visit while MRI ordered; 
diagnosis also of peripheral polyneuropathy)  
Patient Identified by Name and Date of Birth: Yes  
  
REHABILITATION AND SPORTS THERAPY  
OCCUPATIONAL THERAPY INSTRUMENTAL ADL AND COMMUNITY MOBILITY EVALUATION  
  
SUBJECTIVE: Mitesh Braun is a 82 year old male seen today for OT functional 
mobility assessmentdue to diagnosis of peripheral polyneuropathy while also 
suspected R CVA. His daughter was with himand provided helpful information 
throughout. She shared that he has taken some significant falls earlier this 
year and that he had aide assist for some time but increased it to 4 days/week 
after his fall in August outside then again 1 week later. More recent fall 3 
weeks ago while at high risk for falling. She shared that his wife passed away 
2020 so he has been living on his own since they but she has 
increased her involvement and support in the time since.  
  
Functional Limitations: walking in the community;walking;bending;heavy 
exertion;stair negotiation;driving  
  
Prior Level of Function: Required assistance  
  
Home Environment  
Patient Lives With: Self/Alone  
Assistance Available: Part-Time;Home Aide (daughter involved living around the 
corner from patient and seeing him/checking in frequently)  
Home Type: Multi-Level  
Transportation: Travels as a passenger;SUV  
  
Patient Goals: to return to driving  
  
Intake Information: Prescription present  
  
  
Falls Interview: Two or more falls in the last year  
  
Relevant History  
Past Relevant Medical Conditions: Arthritis;Cerebral Vascular 
Accident;Falls;Hypertension;Depression;Neuropathy  
Highest Level of Education: Trade School  
Preferred Language: English  
Right or Left Handed: Right  
Employment: Retired (was )  
Recreation / Current Exercise: no current exercise  
Hobbies / Interests: reads devotionals and cookbooks; rather sedentary  
Home Environment  
Patient Lives With: Self/Alone  
Assistance Available: Part-Time;Home Aide (daughter involved living around the 
corner from patient and seeing him/checking in frequently)  
Home Type: Multi-Level  
Transportation: Travels as a passenger;SUV  
  
  
Activities of Daily Living: Modified Independent and supervision for showering  
Instrumental Activities of Daily Living: assist for most tasks in the home while
 some light meal preparation on his own; does take his own medication but his 
daughter sets up as well as manages the finances which she has been doing since 
his wife passed away  
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  
State: [place] License/Permit #: [id number] Expires: 25  
Restrictions: corrective lenses; intrastate CDL  
5 Yr. Violation HX: no 5 Yr. MVA HX: no  
Handicap Parking Placard: YES  
_____________________________________________________________________  
  
1. Mitesh Braun self report indicates an awareness of: Weakness, 
incoordination, or limited motion in L arm and B legs  
Decreased balance  
Tingling or Numbness in: B hands and feet  
Forgetting new information  
2. Mitesh Braun expressed confidence regarding driving when driving alone 
and on familiar road ways.  
3. Mitesh Braun expressed concerns regarding driving at night/decreased 
light conditions, in congested traffic, on the interstate, and on long trips.  
  
OBJECTIVE MEASURES WITH LEVEL OF FUNCTION:  
SENSORIMOTOR ASSESSMENT:  
  
Hand dominance: Right  
Level of Function Relevant to I ADL, Community Mobility, and Driving:  
Right UE: Sufficient  
Left UE: Marginal while significant contractures/tone in L hand  
: functional for R hand while decreased for L hand  
Right LE: Marginal  
Left LE: Sufficient  
Sitting Balance: Sufficient  
Head / Neck: decreased neck ROM including rotation to right  
Ambulation: Insufficient while requires rollator to maximize safety with 
repeated history of falls in last several months with increased vulnerability if
 out in the community on own  
Transfers: Marginal  
Loading of Device: NA while did not observe him trying to fold/stow his rollator
 while decreased balance issues  
  
ASSESSMENT OF SENSORIMOTOR FUNCTION: Marginal for Driving  
_____________________________________________________________________  
  
VISION SCREENING:  
Vision  
Vision Deficits: Wears corrective lenses  
Corrective lenses: bifocals with correction for close up only; does not wear 
always; has had B cataract surgery in past; reports having floaters in both eyes  
  
Corrective Lenses:  
Wears glasses / contact lenses for driving  
Distant Acuity:  
Binocular: 20 / 40  
Nighttime Glare:  
Right: 20 / 40-1  
Left: 20 / 50-1  
  
Color Perception: pass  
Depth Perception: Pass  
Contrast Sensitivity: minimal decrease for R eye while severe impairment for L 
eye  
Peripheral Vision: Not sufficient for driving  
Right Eye: Failed to recognize stimuli: 35 degrees nasally, 55 degrees, 70 
degrees, and 85 degrees  
Left Eye: Failed to recognize stimuli: 70 degrees and 85 degrees  
Pursuits: Inaccurate / Overshoots  
Saccades: Inaccurate / Overshoots  
Convergence: WFL  
Nystagmus: Not Apparent  
Diplopia: No complaints  
Strabismus: No OU  
Cataracts: No OU  
Glaucoma: No. OU  
Other Eye Conditions / Diseases Reported: reports having floaters in B eyes  
  
ASSESSMENT OF VISUAL FUNCTION: Not Suggestive of Safe Driving Potential  
____________________________________________________________________  
  
COGNITIVE / PERCEPTUAL ASSESSMENT:  
  
SHORT BLESSED TEST  
  
Short Blessed Test  
1. What Year Is It Now?: Correct  
2. What Month Is It Now?: Correct  
3. What Time is it? (WIthin 1 hour): Correct  
4. Count Aloud Backwards 20 to 1 (Errors): 0  
5. Months of the Year in Reverse Order (Errors): 2  
6. Memory Phrase (Errors) : 5  
Short Blessed Final Score: 14  
  
Short Blessed Test Scorin-8; Normal to minimal impairment  
9-19; Moderate impairment  
20-28; Severe impairment  
[email or web address]  
  
Immediate Recall: WFL @ 5/6 digits  
  
Visual Scanning/Attention:  
Trail Making Part B (sec): 319 sec  
50th percentile norm for age group: 70-79; Part A: 80 seconds, Part B: 196 
seconds  
  
Homero Clock Drawing Test: Mitesh Braun correctly included 2/8 criteria for 
this test. He failed to include or correctly place: all 12 hours in correct 
numeric order, starting with 12 at the top  
only the numbers 1-12 (no duplicates, omissions, or foreign markings)  
the numbers equally, or nearly so, from each other  
the numbers equally spaced, or nearly so, from the edge of the [place]  
one clock hand at the two o'clock position  
only two clock hands  
  
According to The Physician's Guide to Assessing and Counseling Older Drivers, 
2003, any incorrect element in the Homero Clock Scoring signals a need for 
intervention.  
  
Motor Free Visual Perception Test: MVPT Total Score: 25  
MVPT Processing time (seconds): 10.1  
Norms: 70-81 y/o: Raw Score 25-35; Processing Time 4.5-7.1 seconds +/- .5 
seconds  
  
Visual Inattention / Unilateral Neglect: Not Apparent  
  
ASSESSMENT OF COGNITIVE / PERCEPTUAL FUNCTION: Not Suggestive of Safe Driving 
Potential  
  
___________________________________________________________________  
Altaf  Simulator:  
Simple Brake Reaction Time: Average Distance: 67 feet (Normal = 60 feet)  
R foot pedal operation method; was provided with instructions and time for 
practice prior to screening  
  
  
Education:  
Education  
Learning Preferences: Explanation  
Barriers: Cognitive Limitations;Hearing Deficit  
Learning/educational needs: Safety;Plan of Care  
Education Provided: Yes, see treatment interventions for education provided  
Education Provided To: Patient;Family (daughter)  
Education Mode/Type: Explanation/Discussion  
Response to Education/Teach Back: States/Identifies  
  
TREATMENT:  
Evaluation  
Self-Care Home Management:  
1: refer to documentation for details  
2: provided education and support to patient and his daughter  
Skilled Intervention: Skilled judgment in the selection of proper modification 
for activity of daily living/home management based on clinical presentation, 
deficits, and needs.  
Educated the patient regarding recommendations and provided written instruction 
to facilitate compliance.  
Reviewed patient specific diagnosis in relation to activities of daily 
living/home management.  
Activity progression based on professional judgement.  
  
PLAN OF CARE:  
  
SUMMARY AND RECOMMENDATIONS  
*The information in this report indicates the ability of the  to operate a
 motor vehicle on this date only. Due to the complex nature of the safe 
operation of a motor vehicle, and considering the demands of integrating 
changing environmental conditions, and visual, cognitive, and physical skills, 
successful completion of this program is not a guarantee of safe driving in the 
future.  
________________________________________________________________  
  
ASSESSMENT OF INSTRUMENTAL ADL AND COMMUNITY MOBILITY: Mitesh Braun presents
 with the diagnosisof peripheral polyneuropathy as well as suspected of R CVA. 
He presents with impairments of overshooting with oculomotor skills, history of 
significant falls/significant fall risk, decreased neck ROM, required aide 
assist for ADL/IADL's, decreased L eye glare far acuity/contrast 
sensitivity/DOES NOT MEET THE VISUAL FIELD SCREENING REQUIREMENTS FOR [facility] [facility] 
FOR DRIVING, CLOCK DRAWING SCORE, PERFORMANCE ON VISUAL PERCEPTUAL SCREENING 
INCLUDING FOR RAW SCORE AND SLOWED VISUAL PROCESSING SPEED, DECREASED 
CONCENTRATION AND RECALL MEMORY, MARKED IMPAIRMENT ON ALTERNATING ATTENTION 
TASK.  
  
RECOMMENDATIONS:  
ADL/IADL Recommendations: ongoing assist for self care and home management as 
needed while aides currently coming in 4 days/week  
Driving Recommendations: REFRAIN FROM DRIVING WHILE PERMANENT 
CESSATION/[facility] FROM DRIVING INDICATED. THIS THERAPIST WILL PROVIDE 
PHYSICIAN WITH [facility] REPORTING INFORMATION SO THAT LICENSE SUSPENSION PROCESS
 CAN BE INITIATED  
Miller's Visual Field Exam Requested: Yes  
Recommended Complete Eye Exam: Yes  
  
Planned Interventions, Frequency, and Duration: Current Frequency: 1 visit  
Duration: 1 visit  
Total Number of Visits Planned: 0  
  
Patient demonstrates fair understanding of plan of care and treatment. The above
 results were discussed and agreed upon by patient/family.  
  
  
Billing: Total Treatment Time Minutes (timed/untimed) 135 minutes  
Evaluation - Moderate Complexity (15841)  
Self Care / Home Management (89361): 1:1 time: 75 minutes (5 units: 68-82 mins)  
Total time: 135 minutes  
  
Ade Vazquez, OT/L, CDRS, CDI  
Certified  Rehabilitation Specialist  
  
Electronically signed by Ade Vazquez OT/L at 2022 3:31 PM EST  
  
documented in this encounterProMedica [facility]11- Miscellaneous Notes* 
  Telephone Encounter - Inga Gutierres - 2022 11:43 AM ESTFormatting of this
   note might be different from the original.  
Patient scheduled   
Electronically signed by Inga Gutierres at 2022 11:43 AM EST  
  
* Telephone Encounter - Inga Gutierres - 2022 11:23 AM ESTFormatting of this
   note might be different from the original.  
Spoke to patient. He is going to talk to his daughter to see when she can bring 
him. Patient does not drive anymore. Patient or daughter will call back. Call 
back number provided.  
Electronically signed by Inga Gutierres at 2022 11:24 AM EST  
  
* Telephone Encounter - Angela Vazquez - 2022 10:23 AM ESTFormatting of 
  this note might be different from the original.  
Referral faxed to [facility].  
  
Angela Vazquez  
  
Electronically signed by Angela Vazquez at 2022 10:23 AM EST  
  
* Telephone Encounter - Angela Vazquez - 2022 11:41 AM EDTFormatting of 
  this note might be different from the original.  
New Patient referral received and given to Dr. Newby for review.  
  
DX: ABNORMAL ELECTROPHORESIS/LOW HEMOGLOBIN  
  
REF PROV: ALAYNA SHAKIR  
  
INS: MEDICARE A & B/MMO SUPPLEMENT  
  
Angela Vazquez  
  
Electronically signed by Angela Vazquez at 2022 11:42 AM EDT  
  
documented in this encounterProMedica Toledo Hospital11- Miscellaneous Notes* 
  Telephone Encounter - Nathalia Peterson RN - 2022 8:49 AM EDTFormatting 
  of this note might be different from the original.  
Pts daughter called and is notified of providers message and instructions. She 
voices understanding.  
  
aNthalia Peterson RN  
  
Electronically signed by Nathalia Peterson RN at 2022 8:50 AM EDT  
  
* Telephone Encounter - Júnior Ahuaj MD - 2022 6:16 PM EDT
  Formatting of this note might be different from the original.  
Noted carvedilol discontinued. Continue to monitor BP and call nephrology or 
here for concerns.  
Electronically signed by Júnior Ahuja MD at 2022 6:18 PM EDT  
  
* Telephone Encounter - Lenore Lipscomb - 2022 1:05 PM EDTFormatting 
  of this note might be different from the original.  
Daughter Dixie is calling in regards to patient seeing kidney doctor Dr Alayna Pugh at [facility] yesterday. She states BP was 102/70 but 
hemoglobin was at 9. Provider reduced Carvedilol medication to 6.25 mg. Also 
told him to take Blood Pressure reading in the morning before any medication. If
 top number is under 130 to call her office. This morning BP was 106/60. Dixie 
thencalled Dr Pugh office and patient was then advised to stop the 
Carvedilol. Advised Dixie to call Dr Pugh office to have lab results fax to
 our office.  
Electronically signed by Lenore Silva Pss at 2022 1:14 PM EDT  
  
documented in this encounterProMedica Toledo Hospital10- Instructions* Patient 
  Instructions*   
  
Júnior Ahuja MD - 10/17/2022 2:53 PM EDT  
  
Formatting of this note might be different from the original.  
DISCONTINUE LISINOPRIL.  
Electronically signed by Júnior Ahuja MD at 10/17/2022 2:53 PM EDT  
  
  
  
documented in this encounterProMedica Toledo Hospital10- History of Present 
illness Narrative* Júnior Ahuja MD - 10/17/2022 2:34 PM EDTFormatting of 
  this note is different from the original.  
This note was created using [employer].  
Subjective  
Mitesh Braun is a 82 year old male here with daughter. He fell again and was
 in the ER  forhead injury. He was scheduled with neurology. His 
hypertension was still well controlled on one half dose of lisinopril. He was 
scheduled to see his nephrologist in 2 weeks.  
  
Daughter was concerned about his ability to drive safely. We discussed options 
and he felt stronglythat he could still drive safely with left leg weakness. He 
agreed to a driving evaluation.  
  
Review of Systems  
Constitutional: Negative.  
Respiratory: Negative.  
Cardiovascular: Negative.  
Gastrointestinal: Negative.  
Musculoskeletal: Positive for gait problem.  
Neurological: Positive for weakness. Negative for dizziness, light-headedness 
and headaches.  
  
ACTIVE PROBLEM LIST  
Essential hypertension  
Caren On Cpap  
Esophageal Reflux  
Gout  
Mixed hyperlipidemia  
Impaired Fasting Glucose  
Aortic Valve Disorder  
Polycystic Kidney  
Adenomatous Colon Polyp  
Thrombocytopenia (HCC)  
Cad (Coronary Artery Disease), Native Coronary Artery  
Adjustment Disorder With Depressed Mood  
Asthma Exacerbation  
Peripheral Polyneuropathy  
Splenomegaly  
Ckd (Chronic Kidney Disease) Stage 3, Gfr 30-59 Ml/Min (Hcc)  
Abnormality of Gait  
Anemia of Chronic Disease  
Urge Incontinence of Urine  
Obesity, Class II, Bmi 35-39.9  
Frequent Falls  
  
Current Outpatient Medications  
Medication Sig  
ascorbic acid-elderberry fruit 100-50 mg chew Take 2 tablets by mouth once 
daily.  
cyanocobalamin (VITAMIN B-12) 2,500 mcg tablet Take 2,500 mcg by mouth once 
daily.  
melatonin 10 mg tab Take 1 tablet by mouth as needed.  
aspirin, enteric coated (ASPIRIN, ENTERIC COATED) 81 mg EC tablet Take 81 mg by 
mouth once daily.  
albuterol HFA (PROVENTIL HFA, VENTOLIN HFA) 90 mcg/actuation inhaler Inhale 2 
Puffs as instructed every 4 hours as needed for wheezing/shortness of breath.  
lisinopril (ZESTRIL) 40 mg tablet Take 0.5 tablets by mouth once daily. ONE HALF
 TABLET DAILY.  
omeprazole (PRILOSEC) 40 mg capsule Take 1 capsule by mouth once daily.  
carvedilol (COREG) 12.5 mg tablet Take 1 tablet by mouth twice daily with meals.  
allopurinol (ZYLOPRIM) 300 mg tablet Take 1 tablet by mouth once daily.  
magnesium oxide 400 mg magnesium cap Take 1 capsule by mouth once daily.  
fenofibrate nanocrystallized (TRICOR) 48 mg tablet Take 1 tablet by mouth once 
daily.  
sertraline (ZOLOFT) 50 mg tablet Take 1 tablet by mouth once daily.  
nitroglycerin sublingual (NITROQUICK) 0.4 mg SL tablet Dissolve 1 tablet under 
the tongue every 5 minutes as needed for chest pain. FOR CHEST PAIN. IF NO 
RELIEF CALL 911  
fluticasone (FLONASE) 50 mcg/actuation nasal spray Use 1 Spray in each nostril 
once daily.  
ferrous sulfate 325 mg (65 mg iron) EC tablet Take 1 tablet by mouth twice daily
 with meals.  
oxybutynin ER (DITROPAN XL) 15 mg 24 hr Extended Rel Tab Take 2 tablets by mouth
 once daily.  
amoxicillin (POLYMOX, AMOXIL) 500 mg capsule Take four tablets, one hour prior 
to dental work (Patient taking differently: Take four tablets by mouth , one 
hour prior to dental work)  
betamethasone valerate 0.1 % lotion Apply to affected area twice daily.  
fluticasone-vilanterol (BREO ELLIPTA) 200-25 mcg/dose inhaler Inhale 1 
Inhalation as instructed once daily. Inhale one puff once daily. DO NOT CLICK 
OPEN UNTIL READY FOR DOSE  
multivitamin tablet Take 1 tablet by mouth once daily.  
  
No current facility-administered medications for this visit.  
  
  
Objective  
/56 (BP Site: Right Arm, BP Position: Sitting, BP Cuff Size: Large Adult) 
  Pulse 64   Temp 36.3 C (97.4 F) (Temporal)   Resp 16   Wt 112.9 kg (249 lb)   
BMI 38.42 kg/m  
Physical Exam  
Constitutional:  
General: He is not in acute distress.  
Cardiovascular:  
Rate and Rhythm: Normal rate and regular rhythm.  
Pulmonary:  
Breath sounds: Normal breath sounds.  
Musculoskeletal:  
Right lower leg: Edema present.  
Left lower leg: Edema present.  
Neurological:  
General: No focal deficit present.  
Mental Status: He is alert.  
Gait: Gait abnormal.  
Comments: Rollator dependent.  
  
Component Latest Ref Rng & Units 2022  
Protein, Total 6.3 - 8.0 g/dL 5.8 (L)  
Albumin 3.9 - 4.9 g/dL 3.5 (L)  
Calcium 8.5 - 10.2 mg/dL 10.8 (H)  
Bilirubin, Total 0.2 - 1.3 mg/dL 0.4  
Alkaline Phosphatase 38 - 113 U/L 85  
AST 14 - 40 U/L 30  
ALT 10 - 54 U/L 21  
Glucose 74 - 99 mg/dL 108 (H)  
BUN 9 - 24 mg/dL 33 (H)  
Creatinine 0.73 - 1.22 mg/dL 1.31 (H)  
Sodium 136 - 144 mmol/L 140  
Potassium 3.7 - 5.1 mmol/L 5.5 (H)  
Chloride 97 - 105 mmol/L 108 (H)  
CO2 22 - 30 mmol/L 25  
Anion Gap 9 - 18 mmol/L 7 (L)  
eGFR >=60 mL/min/1.73m 54 (L)  
WBC 3.70 - 11.00 k/uL 5.22  
RBC 4.20 - 6.00 m/uL 3.27 (L)  
Hemoglobin 13.0 - 17.0 g/dL 10.9 (L)  
Hematocrit 39.0 - 51.0 % 34.6 (L)  
MCV 80.0 - 100.0 fL 105.8 (H)  
MCH 26.0 - 34.0 pg 33.3  
MCHC 30.5 - 36.0 g/dL 31.5  
RDW-CV 11.5 - 15.0 % 15.0  
Platelet Count 150 - 400 k/uL 69 (L)  
MPV 9.0 - 12.7 fL 12.2  
Absolute nRBC <0.01 k/uL <0.01  
  
Assessment and Plan  
  
1. Peripheral polyneuropathy - ICD9: 356.9, ICD10: G62.9 (primary diagnosis)  
- To see neurology.  
- CONSULT TO OCCUPATIONAL THERE for driving safety evaluation.  
  
2. Abnormality of gait - ICD9: 781.2, ICD10: R26.9  
- CONSULT TO OCCUPATIONAL THER  
  
3. Stage 3a chronic kidney disease (HCC) - ICD9: 585.3, ICD10: N18.31  
- eGFR: Stable  
- Discontinue LISINOPRIL.  
- BASIC METABOLIC PNL in 2 weeks.  
- Nephrology in 2 weeks.  
  
4. Essential hypertension - ICD9: 401.9, ICD10: I10  
- good control  
- See #3.  
  
Júnior Ahuja MD  
  
  
Electronically signed by Júnior Ahuja MD at 10/17/2022 3:04 PM EDT  
  
documented in this encounterProMedica Toledo Hospital10- Miscellaneous Notes* HH 
  PT DISCHARGE - Brenda Hernandez, PT - 10/12/2022 10:41 AM EDT
  Formatting of this note might be different from the original.  
SITUATION: private duty caregiver present during today's visit. patient reports 
the following sincethe last homecare visit: medications/allergies--no changes, 
no fall. patient reports he is doing good . States he would love to continue 
home PT if he could  
  
BACKGROUND: Diagnoses (reason for Home Care): falls  
Weight Bearing/Precaution Changes: no changes  
  
ASSESSMENT:  
Focus of visit: reassessment/discharge  
Explained to pt that he has plateaued and it would be a maintenance situation. 
[facility] him with his HEP during their 
visit  
  
Physical therapy discharged: goals achieved.  
  
Functional performance at discharge - bed mobility independent, transfers 
independent, ambulation independent and stairs supervision.  
  
Plan of care, goals, and discharge reviewed and agreed upon with patient and/or 
caregiver.  
  
RECOMMENDATION:  
Patient discharged from home health services.  
  
Instructions to include:home exercise program as directed  
  
See intervention summary for intervention/education details.  
Electronically signed by Brenda Hernandez PT at 10/12/2022 4:20 PM EDT  
  
documented in this encounterProMedica Toledo Hospital10- Miscellaneous Notes*  
  PT ROUTINE/REASSESSMENT/RECERT/CASE MGMT - Lalitha Nascimento PTA - 10/10/2022 8:53
   AM EDTFormatting of this note might be different from the original.  
SITUATION:  
private duty caregiver present during today's visit. patient reports the 
following since the last homecare visit: medications/allergies--no changes, no 
fall. patient reports he is doing good .  
  
BACKGROUND:  
Diagnoses (reason for Home Care): falls  
  
Weight Bearing/Precaution Changes: no changes  
  
ASSESSMENT:  
Focus of visit seted and standing strength and balance exercises for HEP. 
patient remains challenged w/ seated left hip flexion but otherwise does well w/
 exercises. to continue w/ ww or rollator forall ambulation for safety  
  
Plan of care, goals, and visit frequency reviewed and agreed upon with patient 
and/or caregiver.  
  
Current Discharge Plan: family support  
Anticipate discharge by 10/12/22  
  
RECOMMENDATION:  
Next visit to focus on PT to see for possible DC  
  
See intervention summary for intervention/education details.  
Electronically signed by Lalitha Nascimento PTA at 10/10/2022 12:00 PM EDT  
  
documented in this encounterProMedica Toledo Hospital10- Miscellaneous Notes*  
  PT ROUTINE/REASSESSMENT/RECERT/CASE MGMT - Lalitha Nascimento PTA - 10/07/2022 2:31
   PM EDTFormatting of this note might be different from the original.  
SITUATION:  
only patient present during today's visit. patient reports the following since 
the last homecare visit: medications/allergies--no changes, no fall. patient 
reports he is doing ok today.  
  
BACKGROUND:  
Diagnoses (reason for Home Care): physicians referral for falls and neuropathy  
  
fall last week with L hip contusion  
  
Weight Bearing/Precaution Changes: no changes  
  
ASSESSMENT:  
Focus of visit resumed standing exercises for strengthening and balance. gait 
training w/ww, transfers  
  
Plan of care, goals, and visit frequency reviewed and agreed upon with patient 
and/or caregiver.  
  
Current Discharge Plan: independent with home exercise program  
Anticipate discharge by 10/22/22  
  
RECOMMENDATION:  
Next visit to focus on increase reps ifable  
  
See intervention summary for intervention/education details.  
Electronically signed by Lalitha Nascimento PTA at 10/07/2022 3:22 PM EDT  
  
documented in this encounterProMedica Toledo Hospital10- Miscellaneous Notes* 
  Telephone Encounter - Nathalia Peterson RN - 10/05/2022 3:02 PM EDTFormatting 
  of this note might be different from the original.  
Called and left a voicemail for the Patient's daughter to call back and ask for 
a nurse to receive the providers message. Faxed orders, last OV note, labs, and 
x-rays to [facility] at fax # [phone].  
  
Nathalia Peterson RN  
  
Electronically signed by Nathalia Peterson RN at 10/05/2022 3:05 PM EDT  
  
* Telephone Encounter - Júnior Ahuja MD - 10/05/2022 1:13 PM EDT
  Formatting of this note might be different from the original.  
ASSESSMENT/PLAN:  
1. Frequent falls - ICD9: V15.88, ICD10: R29.6 (primary diagnosis)  
- CONSULT TO NEUROLOGY  
  
2. Peripheral polyneuropathy - ICD9: 356.9, ICD10: G62.9  
- CONSULT TO NEUROLOGY  
  
Júnior Ahuja MD  
Electronically signed by Júnior Ahuja MD at 10/05/2022 1:13 PM EDT  
  
* Telephone Encounter - Melva Khalil RN - 10/03/2022 11:19 AM EDT
  Formatting of this note might be different from the original.  
Daughter (Dixie) calls to report that patient had another fall over the weekend.
 She is asking if provider would consider a consult to neurology for neuropathy 
and falls.  
  
Pended for review. Needs diagnosis. Dixie reports she would want it to be sent 
to [facility].  
  
Fax number is [phone].  
Phone number is [phone].  
  
Please review and advise,  
  
Melva Khalil RN  
  
  
Electronically signed by Melva Khalil RN at 10/03/2022 11:30 AM EDT  
  
documented in this encounterProMedica Toledo Hospital10- Miscellaneous Notes* 
  Telephone Encounter - Lalitha Nascimento PTA - 10/05/2022 2:35 PM EDTFormatting of 
  this note might be different from the original.  
Patient reported he fell on  when ambulating outside of home. Denies 
injury and was able to complete PT today without concerns. Thanks  
Electronically signed by Lalitha Nascimento PTA at 10/05/2022 2:37 PM EDT  
  
documented in this encounterProMedica Toledo Hospital10- Miscellaneous Notes* 
  Telephone Encounter - Son Norton Ma - 10/03/2022 1:03 PM EDTFormatting of this
   note might be different from the original.  
Okay given to Neida.  
Son Norton Ma  
  
Electronically signed by Son Norton Ma at 10/03/2022 1:03 PM EDT  
  
* Telephone Encounter - Krista Perez APRN.CNP - 10/03/2022 12:18 PM EDTFormatting 
  of this note might be different from the original.  
Shorty Perez APRN.CNP  
Electronically signed by Krista Perez APRN.CNP at 10/03/2022 12:19 PM EDT  
  
* Telephone Encounter - Starr Marquez LPN - 2022 1:04 PM EDTFormatting 
  of this note might be different from the original.  
Neida from [facility] calling she did her re-assessment and would like to 
continue visits 2 times weekly for 2 more weeks. She is asking for verbal order 
please. If have problem reaching her onwork phone can call her person phone 
number. Please advise  
Electronically signed by Starr Marquez LPN at 2022 1:08 PM EDT  
  
documented in this encounterProMedica Toledo Hospital09- Miscellaneous Notes*  
  PT ROUTINE/REASSESSMENT/RECERT/CASE MGMT - Neida Gutierrez, PT - 2022 11:24
   AM EDTFormatting of this note might be different from the original.  
SITUATION:  
private duty caregiver present during today's visit. patient reports the 
following since the last homecare visit: medications/allergies--no changes, no 
fall. patient reports he thinks he needs more PT, .  
  
BACKGROUND:  
  
Diagnoses (reason for Home Care): physicians referral for falls and neuropathy 
fall last week with L hip contusion  
  
Weight Bearing/Precaution Changes: no changes  
  
ASSESSMENT:  
Focus of visit: reassessment of functional ability. Pt has improved however has 
not achieved goals;only tolerated 3 .5 minutes of standing activity. RPE 4/10. 
He will benefit from continued PT intervention  
  
Plan of care, goals, and visit frequency reviewed and agreed upon with patient 
and/or caregiver.  
  
Current Discharge Plan: independent with home exercise program  
Anticipate discharge by 10/14/22  
  
RECOMMENDATION:  
continue PT 2w2  
Next visit to focus on endurance, ther ex, mobility training  
  
See intervention summary for intervention/education details.  
Electronically signed by Neida Gutierrez PT at 10/02/2022 7:35 AM EDT  
  
documented in this encounterProMedica Toledo Hospital09- Miscellaneous Notes*  
  PT ROUTINE/REASSESSMENT/RECERT/CASE MGMT - Lalitha Nascimento PTA - 2022 8:56
   AM EDTFormatting of this note might be different from the original.  
SITUATION:  
only patient present during today's visit. patient reports the following since 
the last homecare visit: medications/allergies--no changes, no fall. patient 
reports he is doing ok.  still not as strong as i want to be. .  
  
BACKGROUND:  
Diagnoses (reason for Home Care): physicians referral for falls and neuropathy 
fall last week with L hip contusion  
  
Weight Bearing/Precaution Changes: no changes  
  
ASSESSMENT:  
Focus of visit LE strength and balance exercises for HEP, gait training w/ww to 
increase safety w/ functional mobility. Challenged w/ tband and standing 
exercises. denies increased pain but did report of muscle fatigue after, L>R LE 
Issued NOMNC  
  
Plan of care, goals, and visit frequency reviewed and agreed upon with patient 
and/or caregiver.  
  
Current Discharge Plan: independent with home exercise program  
Anticipate discharge by TBD  
  
RECOMMENDATION:  
Next visit to focus on PT to see for possible DC or reassessment  
  
See intervention summary for intervention/education details.  
Electronically signed by Lalitha Nascimento PTA at 2022 9:50 AM EDT  
  
documented in this encounterProMedica Toledo Hospital09- Miscellaneous Notes*  
  PT ROUTINE/REASSESSMENT/RECERT/CASE MGMT - Brenda Hernandez, PT - 
  2022 9:01 AM EDTFormatting of this note might be different from the 
  original.  
SITUATION: only patient present during today's visit. patient reports the 
following since the last homecare visit: medications/allergies--no changes, no 
fall. patient reports he has felt really good for past 2 days..  
BACKGROUND: Diagnoses (reason for Home Care): physicians referral for falls and 
neuropathy fall last week with L hip contusion  
Weight Bearing/Precaution Changes: no changes  
ASSESSMENENT: advancing Le exercises Focusing on L HIp and bilateral hamstrings  
Plan of care, goals, and visit frequency reviewed and agreed upon with patient 
and/or caregiver.  
Current Discharge Plan: independent with home exercise program  
Anticipate discharge by 10/1/22  
RECOMMENDATION: Next visit to focus on resume standing exercises for strength 
and balance See intervention summary for intervention/education details.  
Electronically signed by Brenda Hernandez PT at 2022 4:22 PM EDT  
  
documented in this encounterProMedica Toledo Hospital09- Miscellaneous Notes*  
  PT ROUTINE/REASSESSMENT/RECERT/CASE MGMT - Lalitha Nascimento PTA - 2022 2:11
   PM EDTFormatting of this note might be different from the original.  
SITUATION:  
only patient present during today's visit. patient reports the following since 
the last homecare visit: medications/allergies--no changes, no fall. patient 
reports he has felt really good for past 2 days..  
  
BACKGROUND:  
Diagnoses (reason for Home Care): physicians referral for falls and neuropathy 
fall last week with L hip contusion  
  
Weight Bearing/Precaution Changes: no changes  
  
ASSESSMENT:Focus of visit outdoor ambulation including uneven terrain w/ 
rollator and SBA.! seated rest break needed due to LE muscle fatigue transfers 
on/off chair  
  
Plan of care, goals, and visit frequency reviewed and agreed upon with patient 
and/or caregiver.  
  
Current Discharge Plan: independent with home exercise program  
Anticipate discharge by 10/1/22  
  
RECOMMENDATION:  
Next visit to focus on resume standing exercises for strength and balance  
  
See intervention summary for intervention/education details.  
Electronically signed by Lalitha Nascimento PTA at 2022 3:38 PM EDT  
  
documented in this encounterProMedica [facility] Hospital09- Miscellaneous Notes*  
  PT ROUTINE/REASSESSMENT/RECERT/CASE MGMT - Brenda Hernandez, PT - 
  2022 2:21 PM EDTFormatting of this note might be different from the 
  original.  
SITUATION: only patient present during today's visit. patient reports the 
following since the last homecare visit: medications/allergies--no changes, no 
fall. patient reports that he is a little tired and his L knee is a little sore 
- reports he was standing a lot making pickles yesterday  
BACKGROUND: Diagnoses (reason for Home Care): physicians referral for falls and 
neuropathy fall last week with L hip contusion  
Weight Bearing/Precaution Changes: no changes  
ASSESSMENT: Focus of visit progressiom of strength exercises for HEP, gait 
training w/ww and balce activities. denies increased pain w/ exercises today but
 did report of muscle fatigue  
Plan of care, goals, and visit frequency reviewed and agreed upon with patient 
and/or caregiver.  
Current Discharge Plan: independent with home exercise program Anticipate 
discharge by TBD  
RECOMMENDATION: Next visit to focus on out door ambulation if able See 
intervention summary for intervention/education details.  
Electronically signed by Brenda Hernandez, PT at 2022 4:50 PM EDT  
  
documented in this encounterProMedica Toledo Hospital09- Miscellaneous Notes*  
  PT ROUTINE/REASSESSMENT/RECERT/CASE MGMT - Lalitha Nascimento, PTA - 2022 2:00
   PM EDTFormatting of this note might be different from the original.  
SITUATION:  
only patient present during today's visit. patient reports the following since 
the last homecare visit: medications/allergies--no changes, no fall. patient 
reports he didnt sleep well last night and woke up with a stiff back.  
  
BACKGROUND:  
Diagnoses (reason for Home Care): physicians referral for falls and neuropathy 
fall last week with L hip contusion  
  
Weight Bearing/Precaution Changes: no changes  
  
ASSESSMENT:  
Focus of visit progressiom of strength exercises for HEP, gait training w/ww and
 balce activities. denies increased pain w/ exercises today but did report of 
muscle fatigue  
  
Plan of care, goals, and visit frequency reviewed and agreed upon with patient 
and/or caregiver.  
  
Current Discharge Plan: independent with home exercise program  
Anticipate discharge by TBD  
  
RECOMMENDATION:  
Next visit to focus on out door ambulation if able  
  
See intervention summary for intervention/education details.  
Electronically signed by Lalitha Nascimento PTA at 2022 3:20 PM EDT  
  
documented in this encounterPro[facility]- Miscellaneous Notes*  
  PT ROUTINE/REASSESSMENT/RECERT/CASE MGMT - Lalitha Nascimento, JOSHUA - 2022 
  10:29 AM EDTFormatting of this note might be different from the original.  
SITUATION:  
private duty caregiver present during today's visit. patient reports the 
following since the last homecare visit: medications/allergies--no changes, no 
fall. patient reports he was only a little soreafter last PT visit . .  
  
BACKGROUND:  
Diagnoses (reason for Home Care): fall w/left hip contusion  
  
Weight Bearing/Precaution Changes: no changes  
  
ASSESSMENT:  
Focus of visit strengthening exexises for HEP and gait training w/ rollator  
  
Plan of care, goals, and visit frequency reviewed and agreed upon with patient 
and/or caregiver.  
  
Current Discharge Plan: family support  
Anticipate discharge by 10/1/22  
  
RECOMMENDATION:  
Next visit to focus on try standing hamstring curls and hip abduction  
  
See intervention summary for intervention/education details.  
Electronically signed by Lalitha Nascimento PTA at 2022 1:13 PM EDT  
  
documented in this encounterPro[facility]09- Miscellaneous Notes*  
  PT ROUTINE/REASSESSMENT/RECERT/CASE MGMT - Brenda JorgebhaveshtrishFredy, PT - 
  2022 10:21 AM EDTFormatting of this note might be different from the 
  original.  
SITUATION: paid cg present during today's visit. patient reports  Im not too bad
 today  .  
BACKGROUND: Diagnoses (reason for Home Care): physicians referral for falls and 
neuropathy fall last week with L hip contusion  
Past Medical History: Peripheral polyneuropathy Contusion of left hip region, 
subsequent encounter Frequent falls Musc ular weakness Abnormality of gait 
Essential hypertension CAREN On Cpap Esophageal Reflux Gout Mixed hyperlipidemia 
Aortic Valve Disorder Polycystic Kidney Adenomatous Colon Polyp Thrombocytopenia
 CAD (Coronary Artery Disease), Native Coronary Artery Adjustment Disorder With 
Depr essed Mood Asthma Exacerbation Peripheral Polyneuropathy Splenomegaly CKD 
(Chronic Kidney Disease) Stage 3, GFR 30-59 mL/min Anemia of Chronic Disease 
Urge Incontinence of Urine Obesity, Class II, BMI 35-39.9  
Weight Bearing or Surgical Precautions: none  
  
ASSESSMENT:  
Focus of visit DAVID LE ther ex  
  
Plan of care, goals, and visit frequency reviewed and agreed upon with patient 
and/or caregiver.  
  
Current Discharge Plan: independent with home exercise program  
Anticipate discharge by 10/1/22  
  
RECOMMENDATION:  
Next visit to focus on assess for ex tolerance and DOMS , progress as able  
  
See intervention summary for intervention/education details.  
Electronically signed by Brenda Hernandez PT at 2022 5:23 PM EDT  
  
documented in this encounterProMedica [facility]09- Miscellaneous Notes*  
  PT SOC/KAYLEE/FOLLOW UP/OTHER - Brenda Hernandez PT - 2022 3:06 PM
   EDTFormatting of this note might be different from the original.  
SITUATION:  
only patient present during today's visit. patient reports  Im hoping you can 
help me stop falling .  
  
BACKGROUND:  
Diagnoses (reason for Home Care): physicians referral for falls and neuropathy  
fall last week with L hip contusion  
  
Past Medical History: Peripheral polyneuropathy  
Contusion of left hip region, subsequent encounter  
Frequent falls  
Musc ular weakness  
Abnormality of gait  
Essential hypertension  
CAREN On Cpap  
Esophageal Reflux  
Gout  
Mixed hyperlipidemia  
Aortic Valve Disorder  
Polycystic Kidney  
Adenomatous Colon Polyp  
Thrombocytopenia  
CAD (Coronary Artery Disease), Native Coronary Artery  
Adjustment Disorder With Depr essed Mood  
Asthma Exacerbation  
Peripheral Polyneuropathy  
Splenomegaly CKD (Chronic Kidney Disease) Stage 3, GFR 30-59 mL/min  
Anemia of Chronic Disease  
Urge Incontinence of Urine  
Obesity, Class II, BMI 35-39.9  
  
Weight Bearing or Surgical Precautions: none  
  
ASSESSMENT:  
Patient [facility]. Reviewed and 
explained homecare services. Plan of care, goals, and visit frequency developed,
 reviewed, and agreed upon with patient and/or caregiver.  
  
Patient Goal: stop falling  
  
Patient will benefit from continued physical therapy to address the following 
deficits: strength, balance, gait and transfers.  
  
Current Discharge Plan: independent with home exercise program. Anticipate 
discharge by 10/1/22.  
  
RECOMMENDATION:  
Next visit to focus on developing HEP  
  
Agreeable to PT;  
  
See intervention summary for intervention/education details.  
Electronically signed by Brenda Hernandez PT at 2022 7:39 PM EDT  
  
documented in this encounterProMedica [facility]08- Miscellaneous Notes* 
  Telephone Encounter - Lolis Cervantes RN - 2022 1:02 PM EDTFormatting 
  of this note might be different from the original.  
Spoke with patient's daughter, Dixie. Given message from provider's office. She 
verbalizes understanding.  
Lolis Cervantes RN  
Electronically signed by Lolis Cervantes RN at 2022 1:03 PM EDT  
  
* Telephone Encounter - Helen E Zollinger LPN - 2022 12:56 PM EDT
  Formatting of this note might be different from the original.  
Left message for Dixie/daughter to call & ask to speak to a nurse.  
Helen Zollinger LPN  
  
Electronically signed by Helen E Zollinger LPN at 2022 12:57 PM EDT  
  
* Telephone Encounter - Júnior Ahuja MD - 2022 12:15 PM EDT
  Formatting of this note might be different from the original.  
Referral for home therapy was reordered as clarified with home health.  
  
Patient should consider giving up driving.  
Electronically signed by Júnior Ahuja MD at 2022 12:17 PM EDT  
  
* Telephone Encounter - Melva Khalil RN - 2022 8:45 AM EDT
  Formatting of this note might be different from the original.  
Son-In-Law (Christian) calls in on behalf of daughter Dixie to let provider know 
that patient has had 3more falls over the weekend while at his camper. Patient 
had to be assisted up all 3 times. No injuries but he is no longer able to 
assist self up and it is taking patient a lot longer to go up and down the 
stairs to the camper.  
  
Christian asking about patient continuing to drive and orders for HH therapy. 
Notified Christian that patient would be notified from [facility] as referral is still 
active.  
  
Melva Khalil RN  
  
Electronically signed by Melva Khalil RN at 2022 8:55 AM EDT  
  
* Telephone Encounter - Lolis Cervantes RN - 2022 4:31 PM EDTFormatting 
  of this note might be different from the original.  
Patient's daughter calling to let PCP know that patient fell today and landed on
 his back and hit the back of his head/neck on a door. He is @ [facility] and has 
had a CT scan. His main complaint is neckpain. She says they have been unable to
 start PT due to more information needed. Please see Home Care note in Epic from
 Estephanie Rodriguez LPN sent yesterday.  
Daughter is requesting call back when PT can be initiated.  
Lolis Cervantes, RN  
Electronically signed by Lolis Cervantes RN at 2022 4:37 PM EDT  
  
documented in this encounterProMedica [facility]08- Miscellaneous Notes* 
  Telephone Encounter - Neida Douglas - 2022 9:47 AM EDTFormatting of this 
  note might be different from the original.  
Welcome Home Call:  
  
a. Date and Time: 9:48 AM 2022  
  
b. Contact name/relationship: Mitesh marks. Have you been active with any Home Care company in the last 60 days(such as 
help with bathing, filling medications, checking your blood pressure) ? No.  
  
d. [facility] will be providing your care, are you agreeable to 
starting these services? yes (yes or no)  
  
e. Do you have any upcoming appointments in the next few days, or restrictions 
to your schedule? yes  
We would come to see you in 24-48* from your discharge today; Are you agreeable 
to a visit in that time frame? yes (Yes/ No (if no, when would you like to be 
seen?))  
  
f. Caregiver: Patient is able to manage care independently  
  
Please keep our your medications both over the counter and prescribed out for 
the home care to review, your hospital discharge instructions and write down any
 questions you might have.  
  
In order to maintain a safe environment for our caregivers, [facility] requires anyanimals or weapons present in the home be located in a 
secured location.  
  
Our clinicians will call you the night before or the morning of the appointment.
 Their # may come up restricted but they'll leave a VM for you.  
  
In case you have any questions or concerns in the meantime, our # is 
[phone], option 1  
  
Thank you for your time and have a great day.  
  
Neida Douglas  
  
Electronically signed by Neida Douglas at 2022 9:51 AM EDT  
  
documented in this encounterProMedica [facility]08- Miscellaneous Notes* 
  Telephone Encounter - Estephanie Rodriguez LPN - 2022 4:19 PM EDTFormatting
   of this note might be different from the original.  
Thank you for the Home Care Service referral.  
  
In order to proceed, we require an addendum for the following information on the
 encounter on 2022, per Medicare guidelines.  
  
The patient is homebound because of _______________________.  
The patient requires homecare because of ____________________.  
  
AND  
  
We are unable to use Contusion of hip region, Frequent falls, Muscular weakness,
 and Abnormality ofgait as a primary diagnosis. Please update orders to include 
a primary diagnosis causing symptoms: Contusion of hip region, Frequent falls, 
Muscular weakness, and Abnormality of gait.  
  
Kindly, complete an addendum to your 2022 visit to support homecare needs 
for this patient.  
  
A new order for homecare will need to be submitted with the matching Face to 
Face encounter date noted.  
  
If you have any questions, [facility] Intake can be reached at [phone].  
  
Thank you in advance.  
  
Estephanie Rodriguez LPN  
Central Admissions Intake Nurse  
[phone]  
Electronically signed by Estephanie Rodriguez LPN at 2022 4:23 PM EDT  
  
documented in this encounterProMedica [facility]08- Miscellaneous Notes* 
  Telephone Encounter - Helen E Zollinger LPN - 2022 1:21 PM EDT  
  
Formatting of this note might be different from the original.  
Home number does not have vm set up, left message on daughter's phone for 
Patient to call & speak to nurse for non-urgent results.  
Helen Zollinger LPN  
  
  
  
  
Electronically signed by Helen E Zollinger LPN at 2022 1:22 PM EDT  
  
  
* Telephone Encounter - Helen E Zollinger LPN - 2022 1:17 PM EDT  
  
Formatting of this note might be different from the original.  
----- Message from Júnior Ahuja MD sent at 8/3/2022 11:30 PM EDT -----  
Stable anemia, low platelets. Glucose and kidney function okay and stable.  
  
  
  
Electronically signed by Helen E Zollinger LPN at 2022 1:17 PM EDT  
  
  
documented in this encounterProMedica [facility]07- Instructions* Patient 
  Instructions*   
  
Júnior Ahuja MD - 2022 10:13 AM EDT  
  
  
  
Formatting of this note might be different from the original.  
BLOOD TEST TODAY, NON FASTING.  
  
  
  
Electronically signed by Júnior Ahuja MD at 2022 10:14 AM EDT  
  
  
  
  
documented in this encounterProMedica [facility]07- History of Present 
illness Narrative* Júnior Ahuja MD - 2022 10:04 AM EDT  
  
Formatting of this note is different from the original.  
This note was created using [employer].  
Subjective  
Mitesh Braun is a 82 year old male. He had no new concerns. He did not do 
his labs.  
  
He was supposed to have a colonoscopy last year, but this kept getting cancelled
 and rescheduled for one reason or another. He had a history of tubular adenoma.
 He preferred to stay local for the colonoscopy.  
  
Dr. Mccollum switched him to a BiPAP recently, due to air leaks.  
  
Review of Systems  
Constitutional: Negative.  
Respiratory: Negative for chest tightness and shortness of breath.  
Cardiovascular: Positive for leg swelling. Negative for chest pain and 
palpitations.  
Gastrointestinal: Negative for abdominal pain, blood in stool, constipation and 
diarrhea.  
Genitourinary: Negative.  
Musculoskeletal: Positive for gait problem.  
Hematological: Bruises/bleeds easily.  
  
ACTIVE PROBLEM LIST  
Essential hypertension  
Caren On Cpap  
Esophageal Reflux  
Gout  
Mixed hyperlipidemia  
Impaired Fasting Glucose  
Aortic Valve Disorder  
Polycystic Kidney  
Adenomatous Colon Polyp  
Thrombocytopenia (HCC)  
Cad (Coronary Artery Disease), Native Coronary Artery  
Adjustment Disorder With Depressed Mood  
Asthma Exacerbation  
Peripheral Polyneuropathy  
Splenomegaly  
Ckd (Chronic Kidney Disease) Stage 3, Gfr 30-59 Ml/Min ([facility])  
Abnormality of Gait  
Anemia of Chronic Disease  
Urge Incontinence of Urine  
Obesity, Class II, Bmi 35-39.9  
  
PAST SURGICAL HISTORY  
Procedure Laterality Date  
ARTHRP KNE CONDYLE&PLATU MEDIAL&LAT COMPARTMENTS Left 2017  
BAL. ASSIST ENTEROSCOPY W/ BX 2011  
CAPSULE ENDO SMALL BOWEL W EGD 2011  
COLONOSCOPY FLX DX W/COLLJ SPEC WHEN PFRMD   
Colonoscopy  
COLONOSCOPY FLX DX W/COLLJ SPEC WHEN PFRMD   
Colonoscopy  
COLONOSCOPY FLX DX W/COLLJ SPEC WHEN PFRMD 2011  
Colonoscopy  
COLONOSCOPY FLX DX W/COLLJ SPEC WHEN PFRMD 2018  
Colonoscopy  
COLONOSCOPY W/BIOPSY SINGLE/MULTIPLE 2008  
ESOPHAGOGASTRODUODENOSCOPY TRANSORAL DIAGNOSTIC 2018  
EGD  
ESOPHAGOGASTRODUODENOSCOPY TRANSORAL DIAGNOSTIC 2019  
EGD  
ESOPHAGOGASTRODUODENOSCOPY TRANSORAL DIAGNOSTIC 2020  
EGD  
EXCISION PILONIDAL CYST/SINUS SIMPLE 1964  
HEMORRHOIDECTOMY INTERNAL RUBBER BAND LIGATIONS 2018 and 18  
OPTX FEM SHFT FX W/INSJ IMED IMPLT W/WO SCREW Left 2020  
L hip cephalo medullary nail.  
RPLCMT AORTIC VALVE OPN W/STENTLESS TISSUE VALVE 10/04/2011  
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  
RPR UMBILICAL HRNA 5 YRS/> REDUCIBLE 09/10/2010  
Hernia repair, umbilical >5yr  
  
  
Current Outpatient Medications  
Medication Sig  
omeprazole (PRILOSEC) 40 mg capsule Take 1 capsule by mouth once daily.  
carvedilol (COREG) 12.5 mg tablet Take 1 tablet by mouth twice daily with meals.  
allopurinol (ZYLOPRIM) 300 mg tablet Take 1 tablet by mouth once daily.  
magnesium oxide 400 mg magnesium cap Take 1 capsule by mouth once daily.  
fenofibrate nanocrystallized (TRICOR) 48 mg tablet Take 1 tablet by mouth once 
daily.  
sertraline (ZOLOFT) 50 mg tablet Take 1 tablet by mouth once daily.  
lisinopril (ZESTRIL) 40 mg tablet Take 1 tablet by mouth once daily.  
nitroglycerin sublingual (NITROQUICK) 0.4 mg SL tablet Dissolve 1 tablet under 
the tongue every 5 minutes as needed for chest pain. FOR CHEST PAIN. IF NO 
RELIEF CALL 911  
fluticasone (FLONASE) 50 mcg/actuation nasal spray Use 1 Spray in each nostril 
once daily.  
ferrous sulfate 325 mg (65 mg iron) EC tablet Take 1 tablet by mouth twice daily
 with meals.  
oxybutynin ER (DITROPAN XL) 15 mg 24 hr Extended Rel Tab Take 2 tablets by mouth
 once daily.  
amoxicillin (POLYMOX, AMOXIL) 500 mg capsule Take four tablets, one hour prior 
to dental work  
betamethasone valerate 0.1 % lotion Apply to affected area twice daily.  
fluticasone-vilanterol (BREO ELLIPTA) 200-25 mcg/dose inhaler Inhale 1 
Inhalation as instructed once daily. Inhale one puff once daily. DO NOT CLICK 
OPEN UNTIL READY FOR DOSE  
multivitamin tablet Take 1 tablet by mouth once daily.  
  
No current facility-administered medications for this visit.  
  
  
Objective  
/66 (BP Site: Left Arm, BP Position: Sitting, BP Cuff Size: Large Adult)  
 Pulse 60   Temp 36.4 C (97.6 F) (Temporal Artery)   Resp 16   Wt 113.4 kg (250 
lb)   BMI 38.58 kg/m  
Physical Exam  
Constitutional:  
General: He is not in acute distress.  
Eyes:  
Conjunctiva/sclera: Conjunctivae normal.  
Cardiovascular:  
Rate and Rhythm: Normal rate and regular rhythm.  
Heart sounds: No murmur heard.  
No gallop.  
Pulmonary:  
Breath sounds: Normal breath sounds.  
Abdominal:  
Palpations: Abdomen is soft.  
Tenderness: There is no abdominal tenderness.  
Musculoskeletal:  
Right lower le+ Pitting Edema present.  
Left lower le+ Pitting Edema present.  
Neurological:  
General: No focal deficit present.  
Mental Status: He is alert and oriented to person, place, and time.  
Comments: Using a rollator.  
  
Assessment and Plan  
  
1. Adenomatous polyp of colon, unspecified part of colon - ICD9: 211.3, ICD10: 
D12.6 (primary diagnosis)  
Refer to Dr. Sutton.  
- CONSULT TO GASTROENTEROLOGY  
  
2. Impaired fasting glucose - ICD9: 790.21, ICD10: R73.01  
Recheck.  
  
3. Stage 3 chronic kidney disease, unspecified whether stage 3a or 3b CKD (HCC) 
- ICD9: 585.3, ICD10: N18.30  
Recheck.  
  
4. Essential hypertension - ICD9: 401.9, ICD10: I10  
- good control  
- Continue current medication(s)  
- Reviewed risks of HTN and principles of treatment  
  
5. Anemia of chronic disease - ICD9: 285.29, ICD10: D63.8  
Recheck.  
  
6. Thrombocytopenia (HCC) - ICD9: 287.5, ICD10: D69.6  
Recheck.  
  
Júnior Ahuja MD  
  
  
  
Electronically signed by Júnior Ahuja MD at 2022 10:24 AM EDT  
  
  
documented in this encounterProMedica [facility] Hospital06- Miscellaneous Notes* 
  Telephone Encounter - Annmarie Akhtar MA - 2022 11:24 AM EDT  
  
Formatting of this note might be different from the original.  
Will put an appt. Note for F/U with PCP  to assist with scheduling in 
GASTRO.  
  
Annmarie Akhtar MA  
  
  
  
  
Electronically signed by Annmarie Akhtar MA at 2022 11:25 AM EDT  
  
  
* Telephone Encounter - Maribell Vanegas LPN - 2022 12:44 PM EDT  
  
Formatting of this note might be different from the original.  
Attempted to reach patient to scheduled follow up with Radha. No answer and 
unable to leave a message.  
  
Please have patient keep in mind in order to complete scopes he has to have 
someone bring him to the appointment for the scopes and take him home 
afterwards. He is not able to take public transportation for the scopes.  
  
  
  
  
Electronically signed by Maribell Vanegas LPN at 2022 8:44 AM EDT  
  
  
* Telephone Encounter - Radha Kong APRN.CNP - 06/15/2022 1:33 PM EDT  
  
Formatting of this note might be different from the original.  
He was to have schedule a EGD and Colonoscopy for further evaluation of anemia 
last year.  
  
He would need to reschedule with me to get worked up for scopes. \  
Radha Kong APRN.CNP  
  
  
  
  
Electronically signed by Radha Kong APRN.CNP at 06/15/2022 1:34 PM EDT  
  
  
* Telephone Encounter - Nathalia Peterson RN - 06/15/2022 9:45 AM EDT  
  
Formatting of this note might be different from the original.  
Pt reports he missed a call from us. Let Pt I couldn't find a note anywhere. The
 only thing I couldfind was that he needed to schedule his EGD and Colonoscopy. 
Pt reports provider wanted him to go to somewhere far away and Pt states he 
can't because he is an old man and would need to have someone take him. Pt 
states he is without electricity right now so he will have to call back to 
schedule it.  
  
  
  
Electronically signed by Nathalia Peterson RN at 06/15/2022 9:50 AM EDT  
  
  
documented in this encounterProMedica [facility]06- Miscellaneous Notes* 
  Telephone Encounter - Nathalia Peterson, RN - 2022 3:00 PM EDT  
  
Formatting of this note is different from the original.  
Pt [facility]. I asked him 
if he had any swelling, and he reports that he has noticed his legs have been 
swelling since he has stopped. Pt asking if provider still wants him to take it.
 Pt also reports he does not have any Nitro, he ran his last bottle through the 
wash. Please call and advise. If provider still would like Pt on HCTZ, it would 
need to be added to medications that need ordered.  
  
Patient has been identified by name and date of birth: Yes  
Patient phones for refill(s):  
Pending Prescriptions Disp Refills  
OMEPRAZOLE 40 MG CAPSULE,DELAYED RELEASE 90 capsule 3  
Sig: Take 1 capsule by mouth once daily.  
JOHNNY: No  
CARVEDILOL 12.5 MG TABLET 180 tablet 3  
Sig: Take 1 tablet by mouth twice daily with meals.  
JOHNNY: No  
ALLOPURINOL 300 MG TABLET 90 tablet 3  
Sig: Take 1 tablet by mouth once daily.  
JOHNNY: No  
MAGNESIUM 400 MG (AS MAGNESIUM OXIDE) CAPSULE 90 capsule 3  
Sig: Take 1 capsule by mouth once daily.  
JOHNNY: No  
FENOFIBRATE NANOCRYSTALLIZED 48 MG TABLET 90 tablet 3  
Sig: Take 1 tablet by mouth once daily.  
JOHNNY: No  
SERTRALINE 50 MG TABLET 90 tablet 3  
Sig: Take 1 tablet by mouth once daily.  
JOHNNY: No  
LISINOPRIL 40 MG TABLET 90 tablet 3  
Sig: Take 1 tablet by mouth once daily.  
JOHNNY: No  
NITROGLYCERIN 0.4 MG SUBLINGUAL TABLET 25 tablet 3  
Sig: Dissolve 1 tablet under the tongue as needed. FOR CHEST PAIN. IF NO RELIEF 
CALL 911  
JOHNNY: No  
  
  
Date of last office visit in primary care: 22  
Future visit: 22  
Last 2 Encounter Wt Readings:  
Date: Wt:  
2022 115.7 kg (255 lb)  
2021 126.6 kg (279 lb)  
Previous labs/tests for medication:  
Cholesterol:  
HDL Cholesterol (mg/dL)  
Date Value  
2022 25  
  
LDL Cholesterol (mg/dL)  
Date Value  
2022 68  
  
ALT (U/L)  
Date Value  
2021 18  
2021 19  
  
Non HDL Cholesterol (mg/dL)  
Date Value  
2022 98  
  
Blood Pressure:  
BUN (mg/dL)  
Date Value  
2022 28  
  
Sodium (mmol/L)  
Date Value  
2022 139  
Last 1 Encounter BP Readings:  
Date: BP:  
2022 128/76  
Blood Counts:  
WBC (k/uL)  
Date Value  
2022 5.31  
  
RBC (m/uL)  
Date Value  
2022 3.72  
  
Hematocrit (%)  
Date Value  
2022 40.3  
  
Hemoglobin (g/dL)  
Date Value  
2022 12.9  
  
Platelet Count (k/uL)  
Date Value  
2022 69  
  
Liver Function:  
ALT (U/L)  
Date Value  
2021 18  
2021 19  
  
AST (U/L)  
Date Value  
2021 30  
2021 31  
  
Please advise. Thank you. Nathalia Peterson RN  
  
  
  
  
  
Electronically signed by Nathalia Peterson RN at 2022 3:09 PM EDT  
  
  
documented in this encounterProMedica [facility] Hospital03- Miscellaneous Notes* 
  Telephone Encounter - Krista Perez APRN.CNP - 2022 1:25 PM EDT  
  
Formatting of this note might be different from the original.  
Shorty Perez APRN.CNP  
  
  
  
Electronically signed by Krista Perez APRN.CNP at 2022 1:25 PM EDT  
  
  
* Telephone Encounter - Mel Beach LPN - 2022 12:35 PM EDT  
  
Formatting of this note might be different from the original.  
Manual Readin/76 Pulse: 62  
  
Reason for blood pressure check - Last BP elevated  
  
Patient is:  
Taking medication as prescribed Yes  
Took medication today Yes If no, date medication last taken N/A  
Experiencing side effects No  
  
BP was elevated at last appt 22. No BP medication changes were made at that
 time. Taking all medications as prescribed. Denies any chest pain, unusual 
shortness of breath, dizziness, or headaches. No caffeine use. No personal 
history of tobacco use; no current exposure. Alert and oriented.  
  
Pt has been identified by name and birthdate: Yes  
  
Allergies reviewed: Yes  
Latex allergy: no.  
  
Medication - prescribed and OTC reviewed and updated: Yes  
Do you need any prescription refills prior to your next visit: No  
  
Health Maintenance: Reviewed and not up to date and provider notified  
  
Patient advised to continue with current medications and would be contacted if 
any further instructions after review by PCP.  
  
Mel Beach LPN  
  
  
  
  
  
Electronically signed by Mel Beach LPN at 2022 12:35 PM EDT  
  
  
[facility] 
[facility]ness N[facility]ative* Mel Beach LPN - 2022 12:21 PM EDT  
  
Formatting of this note might be different from the original.  
Manual Readin/76 Pulse: 62  
  
Reason for blood pressure check - Last BP elevated  
  
Patient is:  
Taking medication as prescribed Yes  
Took medication today Yes If no, date medication last taken N/A  
Experiencing side effects No  
  
BP was elevated at last appt 22. No BP medication changes were made at that
 time. Taking all medications as prescribed. Denies any chest pain, unusual 
shortness of breath, dizziness, or headaches. No caffeine use. No personal 
history of tobacco use; no current exposure. Alert and oriented.  
  
Pt has been identified by name and birthdate: Yes  
  
Allergies reviewed: Yes  
Latex allergy: no.  
  
Medication - prescribed and OTC reviewed and updated: Yes  
Do you need any prescription refills prior to your next visit: No  
  
Health Maintenance: Reviewed and not up to date and provider notified  
  
Patient advised to continue with current medications and would be contacted if 
any further instructions after review by PCP.  
  
Mel Beach LPN  
  
  
  
  
Electronically signed by Mel Beach LPN at 2022 12:36 PM EDT  
  
  
documented in this [facility]Pro[facility] Past [facility] 
Narrative*   
  
                          Problem      Noted Date   Diagnosed Date Resolved Date  
   
                          Obesity, Class II, BMI 35-39.9 2021  
   
                          Medicare annual wellness visit, subsequent 2019  
   
                                                    Encounter for long-term (cur  
rent) use of   
medications         2018  
   
                                                      
  
  
Overview:Formatting of this note might be different from the original.  
Added automatically from request for surgery [id number]  
   
   
                          History of colonic polyps 2018  
   
                                                      
  
  
Overview:Formatting of this note might be different from the original.  
Added automatically from request for surgery [id number]  
   
   
                          Gastroesophageal reflux disease 2018  
   
                                                      
  
  
Overview:Formatting of this note might be different from the original.  
Added automatically from request for surgery [id number]  
   
   
                          Acute pulmonary edema 10/05/2011                10/06/  
2011  
   
                                                      
  
  
Overview:Formatting of this note might be different from the original.  
10/5/2011  
cardiogenic and noncardiogenic factors  
  
   
   
                          Atelectasis  10/05/2011                10/06/2011  
   
                          Hypotension  10/04/2011                10/06/2011  
   
                                                      
  
  
Overview:Formatting of this note might be different from the original.  
10/4/2011  
goal map 65-80  
   
   
                          Stress hyperglycemia 10/04/2011                10/06/2  
011  
   
                                                      
  
  
Overview:Formatting of this note might be different from the original.  
10/4/2011  
Perioperative insulin resistance and exacerbation of hyperglycemia.  
Control blood glucose with titration of insulin infusion  
  
10/5/2011  
adequate control  
goal FBG<180  
  
   
   
                          Post-op pain 10/04/2011                10/06/2011  
   
                          Mechanically assisted ventilation 10/04/2011            
      10/05/2011  
   
                                                      
  
  
Overview:Formatting of this note might be different from the original.  
10/4/2011  
optimize MV settings, WTE  
current setting:FiO2, 75%, peep 8  
   
   
                          Cardiac insufficiency 10/04/2011                10/05/  
2011  
   
                                                      
  
  
Overview:Formatting of this note might be different from the original.  
10/4/2011  
RV mild to moderate post pump  
goal CI>2.3  
   
   
                          Hypoxemia    10/04/2011                10/06/2011  
   
                          discharge planning 2011                10/19/201  
1  
   
                                                      
  
  
Overview:Formatting of this note might be different from the original.  
age 71,  lives in [place], [place]. No DC needs.  
/  
   
   
                          Pre-op testing 2011                10/04/2011  
   
                                                      
  
  
Overview:Formatting of this note is different from the original.  
Images from the original note were not included.  
[facility]  
PRE-OP CHECKLIST  
  
Surgeon: Deangelo Angulo M.D. Informed Consent Completed: No  
STS Score: 1.4%  
CAD: Yes - CAD on Problem List: Yes  
Is intended procedure a CABG: Yes - is a beta blocker ordered? Yes  
  
H & P completed: Yes  
  
PA/LAT: Completed CT: Completed MRI: N/A LE US: N/A  
  
Cath: Yes - reviewed: Yes Echo:Completed EKG: Completed EF %: 61  
  
PI's: N/A Carotid: Completed Mapping: N/A Dental: Completed PFT's: N/A  
  
Basename 11 0729  
WBC 7.18  
HB 13.1  
HCT 41.8  
  
INR 1.0  
CREAT 1.35  
  
UA: Normal  
HCG:N/A  
  
ABO/ABO Confirmed: Yes  
  
Blood ordered: No  
  
SA Swab: Yes - results: Pending  
  
Last Dose of Anticoagulation: vitamins  
  
Op Note: N/A  
  
Pacemaker Check: N/A  
  
Consults: GI 2001  
  
DM: No  
  
Cardiac Surgical prep: No  
  
SIGNATURE: Krystal Castellanos RN CHECKED BY:  
DATE of SERVICE: 2011  
TIME of SERVICE: 2:23 PM  
  
  
   
   
                          Anemia of chronic disease 2011  
   
                          Intestinal polyp 2011  
   
                                                      
  
  
Overview:Formatting of this note might be different from the original.  
Distal ileum ulcerated polyp.  
   
   
                                                    Nonspecific abnormal results  
 of liver   
function study      2006  
   
                          Impotence of organic origin 2006  
   
                                                    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  
9.9 (morbid   
obesity)                                                    2021  
  
documented as of this encounter (statuses as of 2023)  
[facility]arr[facility]*   
  
                                Problem         Noted Date      Resolved Date  
   
                                Medicare annual wellness visit, subsequent 2019  
   
                                Encounter for long-term (current) use of medicat  
ions 2018  
   
                                                      
  
  
Overview:  
  
  
Formatting of this note might be different from the original.  
Added automatically from request for surgery [id number]   
   
                                History of colonic polyps 2018  
   
                                                      
  
  
Overview:  
  
  
Formatting of this note might be different from the original.  
Added automatically from request for surgery [id number]   
   
                                Gastroesophageal reflux disease 2018  
   
                                                      
  
  
Overview:  
  
  
Formatting of this note might be different from the original.  
Added automatically from request for surgery [id number]   
   
                                Acute pulmonary edema 10/05/2011      10/06/2011  
   
                                                      
  
  
Overview:  
  
  
Formatting of this note might be different from the original.  
10/5/2011  
cardiogenic and noncardiogenic factors  
   
   
                                Atelectasis     10/05/2011      10/06/2011  
   
                                Hypotension     10/04/2011      10/06/2011  
   
                                                      
  
  
Overview:  
  
  
Formatting of this note might be different from the original.  
10/4/2011  
goal map 65-80   
   
                                Stress hyperglycemia 10/04/2011      10/06/2011  
   
                                                      
  
  
Overview:  
  
  
Formatting of this note might be different from the original.  
10/4/2011  
Perioperative insulin resistance and exacerbation of hyperglycemia.  
Control blood glucose with titration of insulin infusion  
  
10/5/2011  
adequate control  
goal FBG<180  
   
   
                                Post-op pain    10/04/2011      10/06/2011  
   
                                Mechanically assisted ventilation 10/04/2011      
  10/05/2011  
   
                                                      
  
  
Overview:  
  
  
Formatting of this note might be different from the original.  
10/4/2011  
optimize MV settings, WTE  
current setting:FiO2, 75%, peep 8   
   
                                Cardiac insufficiency 10/04/2011      10/05/2011  
   
                                                      
  
  
Overview:  
  
  
Formatting of this note might be different from the original.  
10/4/2011  
RV mild to moderate post pump  
goal CI>2.3   
   
                                Hypoxemia       10/04/2011      10/06/2011  
   
                                discharge planning 2011      10/19/2011  
   
                                                      
  
  
Overview:  
  
  
Formatting of this note might be different from the original.  
age 71,  lives in [place], [place]. No DC needs.  
/   
   
                                Pre-op testing  2011      10/04/2011  
   
                                                      
  
  
Overview:  
  
  
Formatting of this note is different from the original.  
Images from the original note were not included.  
[facility]  
PRE-OP CHECKLIST  
  
Surgeon: Deangelo Angulo M.D. Informed Consent Completed: No  
STS Score: 1.4%  
CAD: Yes - CAD on Problem List: Yes  
Is intended procedure a CABG: Yes - is a beta blocker ordered? Yes  
  
H & P completed: Yes  
  
PA/LAT: Completed CT: Completed MRI: N/A LE US: N/A  
  
Cath: Yes - reviewed: Yes Echo:Completed EKG: Completed EF %: 61  
  
PI's: N/A Carotid: Completed Mapping: N/A Dental: Completed PFT's: N/A  
  
Basename 11 0729  
WBC 7.18  
HB 13.1  
HCT 41.8  
  
INR 1.0  
CREAT 1.35  
  
UA: Normal  
HCG:N/A  
  
ABO/ABO Confirmed: Yes  
  
Blood ordered: No  
  
SA Swab: Yes - results: Pending  
  
Last Dose of Anticoagulation: vitamins  
  
Op Note: N/A  
  
Pacemaker Check: N/A  
  
Consults: GI 2001  
  
DM: No  
  
Cardiac Surgical prep: No  
  
SIGNATURE: Krystal Castellanos RN CHECKED BY:  
DATE of SERVICE: 2011  
TIME of SERVICE: 2:23 PM  
  
   
   
                                Anemia of chronic disease 2011  
   
                                Intestinal polyp 2011  
   
                                                      
  
  
Overview:  
  
  
Formatting of this note might be different from the original.  
Distal ileum ulcerated polyp.   
   
                                Nonspecific abnormal results of liver function s  
tudy 2006  
   
                                Impotence of organic origin 2006  
   
                                Obesity, Class III, BMI 40-49.9 (morbid obesity)  
                 2021  
  
documented as of this encounter (statuses as of 2022)  
[facility]*   
  
                                Problem         Noted Date      Resolved Date  
   
                                Medicare annual wellness visit, subsequent 2019  
   
                                Encounter for long-term (current) use of medicat  
ions 2018  
   
                                                      
  
  
Overview:  
  
  
Formatting of this note might be different from the original.  
Added automatically from request for surgery [id number]   
   
                                History of colonic polyps 2018  
   
                                                      
  
  
Overview:  
  
  
Formatting of this note might be different from the original.  
Added automatically from request for surgery [id number]   
   
                                Gastroesophageal reflux disease 2018  
   
                                                      
  
  
Overview:  
  
  
Formatting of this note might be different from the original.  
Added automatically from request for surgery [id number]   
   
                                Acute pulmonary edema 10/05/2011      10/06/2011  
   
                                                      
  
  
Overview:  
  
  
Formatting of this note might be different from the original.  
10/5/2011  
cardiogenic and noncardiogenic factors  
   
   
                                Atelectasis     10/05/2011      10/06/2011  
   
                                Hypotension     10/04/2011      10/06/2011  
   
                                                      
  
  
Overview:  
  
  
Formatting of this note might be different from the original.  
10/4/2011  
goal map 65-80   
   
                                Stress hyperglycemia 10/04/2011      10/06/2011  
   
                                                      
  
  
Overview:  
  
  
Formatting of this note might be different from the original.  
10/4/2011  
Perioperative insulin resistance and exacerbation of hyperglycemia.  
Control blood glucose with titration of insulin infusion  
  
10/5/2011  
adequate control  
goal FBG<180  
   
   
                                Post-op pain    10/04/2011      10/06/2011  
   
                                Mechanically assisted ventilation 10/04/2011      
  10/05/2011  
   
                                                      
  
  
Overview:  
  
  
Formatting of this note might be different from the original.  
10/4/2011  
optimize MV settings, WTE  
current setting:FiO2, 75%, peep 8   
   
                                Cardiac insufficiency 10/04/2011      10/05/2011  
   
                                                      
  
  
Overview:  
  
  
Formatting of this note might be different from the original.  
10/4/2011  
RV mild to moderate post pump  
goal CI>2.3   
   
                                Hypoxemia       10/04/2011      10/06/2011  
   
                                discharge planning 2011      10/19/2011  
   
                                                      
  
  
Overview:  
  
  
Formatting of this note might be different from the original.  
age 71,  lives in [place], [place]. No DC needs.  
/   
   
                                Pre-op testing  2011      10/04/2011  
   
                                                      
  
  
Overview:  
  
  
Formatting of this note is different from the original.  
Images from the original note were not included.  
[facility]  
PRE-OP CHECKLIST  
  
Surgeon: Deangelo Angulo M.D. Informed Consent Completed: No  
STS Score: 1.4%  
CAD: Yes - CAD on Problem List: Yes  
Is intended procedure a CABG: Yes - is a beta blocker ordered? Yes  
  
H & P completed: Yes  
  
PA/LAT: Completed CT: Completed MRI: N/A LE US: N/A  
  
Cath: Yes - reviewed: Yes Echo:Completed EKG: Completed EF %: 61  
  
PI's: N/A Carotid: Completed Mapping: N/A Dental: Completed PFT's: N/A  
  
Basename 11 0729  
WBC 7.18  
HB 13.1  
HCT 41.8  
  
INR 1.0  
CREAT 1.35  
  
UA: Normal  
HCG:N/A  
  
ABO/ABO Confirmed: Yes  
  
Blood ordered: No  
  
SA Swab: Yes - results: Pending  
  
Last Dose of Anticoagulation: vitamins  
  
Op Note: N/A  
  
Pacemaker Check: N/A  
  
Consults: GI 2001  
  
DM: No  
  
Cardiac Surgical prep: No  
  
SIGNATURE: Krystal Castellanos RN CHECKED BY:  
DATE of SERVICE: 2011  
TIME of SERVICE: 2:23 PM  
  
   
   
                                Anemia of chronic disease 2011  
   
                                Intestinal polyp 2011  
   
                                                      
  
  
Overview:  
  
  
Formatting of this note might be different from the original.  
Distal ileum ulcerated polyp.   
   
                                Nonspecific abnormal results of liver function s  
tudy 2006  
   
                                Impotence of organic origin 2006  
   
                                Obesity, Class III, BMI 40-49.9 (morbid obesity)  
                 2021  
  
documented as of this encounter (statuses as of 2022)  
[facility]*   
  
                                Problem         Noted Date      Resolved Date  
   
                                Medicare annual wellness visit, subsequent 2019  
   
                                Encounter for long-term (current) use of medicat  
ions 2018  
   
                                                      
  
  
Overview:  
  
  
Formatting of this note might be different from the original.  
Added automatically from request for surgery [id number]   
   
                                History of colonic polyps 2018  
   
                                                      
  
  
Overview:  
  
  
Formatting of this note might be different from the original.  
Added automatically from request for surgery [id number]   
   
                                Gastroesophageal reflux disease 2018  
   
                                                      
  
  
Overview:  
  
  
Formatting of this note might be different from the original.  
Added automatically from request for surgery [id number]   
   
                                Acute pulmonary edema 10/05/2011      10/06/2011  
   
                                                      
  
  
Overview:  
  
  
Formatting of this note might be different from the original.  
10/5/2011  
cardiogenic and noncardiogenic factors  
   
   
                                Atelectasis     10/05/2011      10/06/2011  
   
                                Hypotension     10/04/2011      10/06/2011  
   
                                                      
  
  
Overview:  
  
  
Formatting of this note might be different from the original.  
10/4/2011  
goal map 65-80   
   
                                Stress hyperglycemia 10/04/2011      10/06/2011  
   
                                                      
  
  
Overview:  
  
  
Formatting of this note might be different from the original.  
10/4/2011  
Perioperative insulin resistance and exacerbation of hyperglycemia.  
Control blood glucose with titration of insulin infusion  
  
10/5/2011  
adequate control  
goal FBG<180  
   
   
                                Post-op pain    10/04/2011      10/06/2011  
   
                                Mechanically assisted ventilation 10/04/2011      
  10/05/2011  
   
                                                      
  
  
Overview:  
  
  
Formatting of this note might be different from the original.  
10/4/2011  
optimize MV settings, WTE  
current setting:FiO2, 75%, peep 8   
   
                                Cardiac insufficiency 10/04/2011      10/05/2011  
   
                                                      
  
  
Overview:  
  
  
Formatting of this note might be different from the original.  
10/4/2011  
RV mild to moderate post pump  
goal CI>2.3   
   
                                Hypoxemia       10/04/2011      10/06/2011  
   
                                discharge planning 2011      10/19/2011  
   
                                                      
  
  
Overview:  
  
  
Formatting of this note might be different from the original.  
age 71,  lives in [place], [place]. No DC needs.  
/   
   
                                Pre-op testing  2011      10/04/2011  
   
                                                      
  
  
Overview:  
  
  
Formatting of this note is different from the original.  
Images from the original note were not included.  
[facility]  
PRE-OP CHECKLIST  
  
Surgeon: Deangelo Angulo M.D. Informed Consent Completed: No  
STS Score: 1.4%  
CAD: Yes - CAD on Problem List: Yes  
Is intended procedure a CABG: Yes - is a beta blocker ordered? Yes  
  
H & P completed: Yes  
  
PA/LAT: Completed CT: Completed MRI: N/A LE US: N/A  
  
Cath: Yes - reviewed: Yes Echo:Completed EKG: Completed EF %: 61  
  
PI's: N/A Carotid: Completed Mapping: N/A Dental: Completed PFT's: N/A  
  
Basename 11 0729  
WBC 7.18  
HB 13.1  
HCT 41.8  
  
INR 1.0  
CREAT 1.35  
  
UA: Normal  
HCG:N/A  
  
ABO/ABO Confirmed: Yes  
  
Blood ordered: No  
  
SA Swab: Yes - results: Pending  
  
Last Dose of Anticoagulation: vitamins  
  
Op Note: N/A  
  
Pacemaker Check: N/A  
  
Consults: GI 2001  
  
DM: No  
  
Cardiac Surgical prep: No  
  
SIGNATURE: Krystal Castellanos RN CHECKED BY:  
DATE of SERVICE: 2011  
TIME of SERVICE: 2:23 PM  
  
   
   
                                Anemia of chronic disease 2011  
   
                                Intestinal polyp 2011  
   
                                                      
  
  
Overview:  
  
  
Formatting of this note might be different from the original.  
Distal ileum ulcerated polyp.   
   
                                Nonspecific abnormal results of liver function s  
nialldy 2006  
   
                                Impotence of organic origin 2006  
   
                                Obesity, Class III, BMI 40-49.9 (morbid obesity)  
                 2021  
  
documented as of this encounter (statuses as of 2022)  
[facility]*   
  
                                Problem         Noted Date      Resolved Date  
   
                                Medicare annual wellness visit, subsequent 2019  
   
                                Encounter for long-term (current) use of medicat  
ions 2018  
   
                                                      
  
  
Overview:  
  
  
Formatting of this note might be different from the original.  
Added automatically from request for surgery [id number]   
   
                                History of colonic polyps 2018  
   
                                                      
  
  
Overview:  
  
  
Formatting of this note might be different from the original.  
Added automatically from request for surgery [id number]   
   
                                Gastroesophageal reflux disease 2018  
   
                                                      
  
  
Overview:  
  
  
Formatting of this note might be different from the original.  
Added automatically from request for surgery [id number]   
   
                                Acute pulmonary edema 10/05/2011      10/06/2011  
   
                                                      
  
  
Overview:  
  
  
Formatting of this note might be different from the original.  
10/5/2011  
cardiogenic and noncardiogenic factors  
   
   
                                Atelectasis     10/05/2011      10/06/2011  
   
                                Hypotension     10/04/2011      10/06/2011  
   
                                                      
  
  
Overview:  
  
  
Formatting of this note might be different from the original.  
10/4/2011  
goal map 65-80   
   
                                Stress hyperglycemia 10/04/2011      10/06/2011  
   
                                                      
  
  
Overview:  
  
  
Formatting of this note might be different from the original.  
10/4/2011  
Perioperative insulin resistance and exacerbation of hyperglycemia.  
Control blood glucose with titration of insulin infusion  
  
10/5/2011  
adequate control  
goal FBG<180  
   
   
                                Post-op pain    10/04/2011      10/06/2011  
   
                                Mechanically assisted ventilation 10/04/2011      
  10/05/2011  
   
                                                      
  
  
Overview:  
  
  
Formatting of this note might be different from the original.  
10/4/2011  
optimize MV settings, WTE  
current setting:FiO2, 75%, peep 8   
   
                                Cardiac insufficiency 10/04/2011      10/05/2011  
   
                                                      
  
  
Overview:  
  
  
Formatting of this note might be different from the original.  
10/4/2011  
RV mild to moderate post pump  
goal CI>2.3   
   
                                Hypoxemia       10/04/2011      10/06/2011  
   
                                discharge planning 2011      10/19/2011  
   
                                                      
  
  
Overview:  
  
  
Formatting of this note might be different from the original.  
age 71,  lives in [place], [place]. No DC needs.  
/   
   
                                Pre-op testing  2011      10/04/2011  
   
                                                      
  
  
Overview:  
  
  
Formatting of this note is different from the original.  
Images from the original note were not included.  
[facility]  
PRE-OP CHECKLIST  
  
Surgeon: Deangelo Angulo M.D. Informed Consent Completed: No  
STS Score: 1.4%  
CAD: Yes - CAD on Problem List: Yes  
Is intended procedure a CABG: Yes - is a beta blocker ordered? Yes  
  
H & P completed: Yes  
  
PA/LAT: Completed CT: Completed MRI: N/A LE US: N/A  
  
Cath: Yes - reviewed: Yes Echo:Completed EKG: Completed EF %: 61  
  
PI's: N/A Carotid: Completed Mapping: N/A Dental: Completed PFT's: N/A  
  
Basename 11 0729  
WBC 7.18  
HB 13.1  
HCT 41.8  
  
INR 1.0  
CREAT 1.35  
  
UA: Normal  
HCG:N/A  
  
ABO/ABO Confirmed: Yes  
  
Blood ordered: No  
  
SA Swab: Yes - results: Pending  
  
Last Dose of Anticoagulation: vitamins  
  
Op Note: N/A  
  
Pacemaker Check: N/A  
  
Consults: GI 2001  
  
DM: No  
  
Cardiac Surgical prep: No  
  
SIGNATURE: Krystal Castellanos RN CHECKED BY:  
DATE of SERVICE: 2011  
TIME of SERVICE: 2:23 PM  
  
   
   
                                Anemia of chronic disease 2011  
   
                                Intestinal polyp 2011  
   
                                                      
  
  
Overview:  
  
  
Formatting of this note might be different from the original.  
Distal ileum ulcerated polyp.   
   
                                Nonspecific abnormal results of liver function s  
tudy 2006  
   
                                Impotence of organic origin 2006  
   
                                Obesity, Class III, BMI 40-49.9 (morbid obesity)  
                 2021  
  
documented as of this encounter (statuses as of 2022)  
[facility]06- History of Past illness Narrative*   
  
                                Problem         Noted Date      Resolved Date  
   
                                Medicare annual wellness visit, subsequent 2019  
   
                                Encounter for long-term (current) use of medicat  
ions 2018  
   
                                                      
  
  
Overview:  
  
  
Formatting of this note might be different from the original.  
Added automatically from request for surgery [id number]   
   
                                History of colonic polyps 2018  
   
                                                      
  
  
Overview:  
  
  
Formatting of this note might be different from the original.  
Added automatically from request for surgery [id number]   
   
                                Gastroesophageal reflux disease 2018  
   
                                                      
  
  
Overview:  
  
  
Formatting of this note might be different from the original.  
Added automatically from request for surgery [id number]   
   
                                Acute pulmonary edema 10/05/2011      10/06/2011  
   
                                                      
  
  
Overview:  
  
  
Formatting of this note might be different from the original.  
10/5/2011  
cardiogenic and noncardiogenic factors  
   
   
                                Atelectasis     10/05/2011      10/06/2011  
   
                                Hypotension     10/04/2011      10/06/2011  
   
                                                      
  
  
Overview:  
  
  
Formatting of this note might be different from the original.  
10/4/2011  
goal map 65-80   
   
                                Stress hyperglycemia 10/04/2011      10/06/2011  
   
                                                      
  
  
Overview:  
  
  
Formatting of this note might be different from the original.  
10/4/2011  
Perioperative insulin resistance and exacerbation of hyperglycemia.  
Control blood glucose with titration of insulin infusion  
  
10/5/2011  
adequate control  
goal FBG<180  
   
   
                                Post-op pain    10/04/2011      10/06/2011  
   
                                Mechanically assisted ventilation 10/04/2011      
  10/05/2011  
   
                                                      
  
  
Overview:  
  
  
Formatting of this note might be different from the original.  
10/4/2011  
optimize MV settings, WTE  
current setting:FiO2, 75%, peep 8   
   
                                Cardiac insufficiency 10/04/2011      10/05/2011  
   
                                                      
  
  
Overview:  
  
  
Formatting of this note might be different from the original.  
10/4/2011  
RV mild to moderate post pump  
goal CI>2.3   
   
                                Hypoxemia       10/04/2011      10/06/2011  
   
                                discharge planning 2011      10/19/2011  
   
                                                      
  
  
Overview:  
  
  
Formatting of this note might be different from the original.  
age 71,  lives in [place], [place]. No DC needs.  
/   
   
                                Pre-op testing  2011      10/04/2011  
   
                                                      
  
  
Overview:  
  
  
Formatting of this note is different from the original.  
Images from the original note were not included.  
[facility]  
PRE-OP CHECKLIST  
  
Surgeon: Deangelo Angulo M.D. Informed Consent Completed: No  
STS Score: 1.4%  
CAD: Yes - CAD on Problem List: Yes  
Is intended procedure a CABG: Yes - is a beta blocker ordered? Yes  
  
H & P completed: Yes  
  
PA/LAT: Completed CT: Completed MRI: N/A LE US: N/A  
  
Cath: Yes - reviewed: Yes Echo:Completed EKG: Completed EF %: 61  
  
PI's: N/A Carotid: Completed Mapping: N/A Dental: Completed PFT's: N/A  
  
Basename 11 0729  
WBC 7.18  
HB 13.1  
HCT 41.8  
  
INR 1.0  
CREAT 1.35  
  
UA: Normal  
HCG:N/A  
  
ABO/ABO Confirmed: Yes  
  
Blood ordered: No  
  
SA Swab: Yes - results: Pending  
  
Last Dose of Anticoagulation: vitamins  
  
Op Note: N/A  
  
Pacemaker Check: N/A  
  
Consults: GI 2001  
  
DM: No  
  
Cardiac Surgical prep: No  
  
SIGNATURE: Krystal Castellanos RN CHECKED BY:  
DATE of SERVICE: 2011  
TIME of SERVICE: 2:23 PM  
  
   
   
                                Anemia of chronic disease 2011  
   
                                Intestinal polyp 2011  
   
                                                      
  
  
Overview:  
  
  
Formatting of this note might be different from the original.  
Distal ileum ulcerated polyp.   
   
                                Nonspecific abnormal results of liver function s  
tudy 2006  
   
                                Impotence of organic origin 2006  
   
                                Obesity, Class III, BMI 40-49.9 (morbid obesity)  
                 2021  
  
documented as of this encounter (statuses as of 2022)  
[facility]- History of Past illness Narrative*   
  
                                Problem         Noted Date      Resolved Date  
   
                                Medicare annual wellness visit, subsequent 2019  
   
                                Encounter for long-term (current) use of medicat  
ions 2018  
   
                                                      
  
  
Overview:  
  
  
Formatting of this note might be different from the original.  
Added automatically from request for surgery [id number]   
   
                                History of colonic polyps 2018  
   
                                                      
  
  
Overview:  
  
  
Formatting of this note might be different from the original.  
Added automatically from request for surgery [id number]   
   
                                Gastroesophageal reflux disease 2018  
   
                                                      
  
  
Overview:  
  
  
Formatting of this note might be different from the original.  
Added automatically from request for surgery [id number]   
   
                                Acute pulmonary edema 10/05/2011      10/06/2011  
   
                                                      
  
  
Overview:  
  
  
Formatting of this note might be different from the original.  
10/5/2011  
cardiogenic and noncardiogenic factors  
   
   
                                Atelectasis     10/05/2011      10/06/2011  
   
                                Hypotension     10/04/2011      10/06/2011  
   
                                                      
  
  
Overview:  
  
  
Formatting of this note might be different from the original.  
10/4/2011  
goal map 65-80   
   
                                Stress hyperglycemia 10/04/2011      10/06/2011  
   
                                                      
  
  
Overview:  
  
  
Formatting of this note might be different from the original.  
10/4/2011  
Perioperative insulin resistance and exacerbation of hyperglycemia.  
Control blood glucose with titration of insulin infusion  
  
10/5/2011  
adequate control  
goal FBG<180  
   
   
                                Post-op pain    10/04/2011      10/06/2011  
   
                                Mechanically assisted ventilation 10/04/2011      
  10/05/2011  
   
                                                      
  
  
Overview:  
  
  
Formatting of this note might be different from the original.  
10/4/2011  
optimize MV settings, WTE  
current setting:FiO2, 75%, peep 8   
   
                                Cardiac insufficiency 10/04/2011      10/05/2011  
   
                                                      
  
  
Overview:  
  
  
Formatting of this note might be different from the original.  
10/4/2011  
RV mild to moderate post pump  
goal CI>2.3   
   
                                Hypoxemia       10/04/2011      10/06/2011  
   
                                discharge planning 2011      10/19/2011  
   
                                                      
  
  
Overview:  
  
  
Formatting of this note might be different from the original.  
age 71,  lives in [place], [place]. No DC needs.  
/   
   
                                Pre-op testing  2011      10/04/2011  
   
                                                      
  
  
Overview:  
  
  
Formatting of this note is different from the original.  
Images from the original note were not included.  
[facility]  
PRE-OP CHECKLIST  
  
Surgeon: Deangelo Angulo M.D. Informed Consent Completed: No  
STS Score: 1.4%  
CAD: Yes - CAD on Problem List: Yes  
Is intended procedure a CABG: Yes - is a beta blocker ordered? Yes  
  
H & P completed: Yes  
  
PA/LAT: Completed CT: Completed MRI: N/A LE US: N/A  
  
Cath: Yes - reviewed: Yes Echo:Completed EKG: Completed EF %: 61  
  
PI's: N/A Carotid: Completed Mapping: N/A Dental: Completed PFT's: N/A  
  
Basename 11 0729  
WBC 7.18  
HB 13.1  
HCT 41.8  
  
INR 1.0  
CREAT 1.35  
  
UA: Normal  
HCG:N/A  
  
ABO/ABO Confirmed: Yes  
  
Blood ordered: No  
  
SA Swab: Yes - results: Pending  
  
Last Dose of Anticoagulation: vitamins  
  
Op Note: N/A  
  
Pacemaker Check: N/A  
  
Consults: GI 2001  
  
DM: No  
  
Cardiac Surgical prep: No  
  
SIGNATURE: Krystal Castellanos RN CHECKED BY:  
DATE of SERVICE: 2011  
TIME of SERVICE: 2:23 PM  
  
   
   
                                Anemia of chronic disease 2011  
   
                                Intestinal polyp 2011  
   
                                                      
  
  
Overview:  
  
  
Formatting of this note might be different from the original.  
Distal ileum ulcerated polyp.   
   
                                Nonspecific abnormal results of liver function s  
nialldy 2006  
   
                                Impotence of organic origin 2006  
   
                                Obesity, Class III, BMI 40-49.9 (morbid obesity)  
                 2021  
  
documented as of this encounter (statuses as of 2022)  
[facility]- History of Past illness Narrative*   
  
                                Problem         Noted Date      Resolved Date  
   
                                Medicare annual wellness visit, subsequent 2019  
   
                                Encounter for long-term (current) use of medicat  
ions 2018  
   
                                                      
  
  
Overview:Formatting of this note might be different from the original.  
Added automatically from request for surgery [id number]  
   
   
                                History of colonic polyps 2018  
   
                                                      
  
  
Overview:Formatting of this note might be different from the original.  
Added automatically from request for surgery [id number]  
   
   
                                Gastroesophageal reflux disease 2018  
   
                                                      
  
  
Overview:Formatting of this note might be different from the original.  
Added automatically from request for surgery [id number]  
   
   
                                Acute pulmonary edema 10/05/2011      10/06/2011  
   
                                                      
  
  
Overview:Formatting of this note might be different from the original.  
10/5/2011  
cardiogenic and noncardiogenic factors  
  
   
   
                                Atelectasis     10/05/2011      10/06/2011  
   
                                Hypotension     10/04/2011      10/06/2011  
   
                                                      
  
  
Overview:Formatting of this note might be different from the original.  
10/4/2011  
goal map 65-80  
   
   
                                Stress hyperglycemia 10/04/2011      10/06/2011  
   
                                                      
  
  
Overview:Formatting of this note might be different from the original.  
10/4/2011  
Perioperative insulin resistance and exacerbation of hyperglycemia.  
Control blood glucose with titration of insulin infusion  
  
10/5/2011  
adequate control  
goal FBG<180  
  
   
   
                                Post-op pain    10/04/2011      10/06/2011  
   
                                Mechanically assisted ventilation 10/04/2011      
  10/05/2011  
   
                                                      
  
  
Overview:Formatting of this note might be different from the original.  
10/4/2011  
optimize MV settings, WTE  
current setting:FiO2, 75%, peep 8  
   
   
                                Cardiac insufficiency 10/04/2011      10/05/2011  
   
                                                      
  
  
Overview:Formatting of this note might be different from the original.  
10/4/2011  
RV mild to moderate post pump  
goal CI>2.3  
   
   
                                Hypoxemia       10/04/2011      10/06/2011  
   
                                discharge planning 2011      10/19/2011  
   
                                                      
  
  
Overview:Formatting of this note might be different from the original.  
age 71,  lives in [place], [place]. No DC needs.  
/  
   
   
                                Pre-op testing  2011      10/04/2011  
   
                                                      
  
  
Overview:Formatting of this note is different from the original.  
Images from the original note were not included.  
[facility]  
PRE-OP CHECKLIST  
  
Surgeon: Deangelo Angulo M.D. Informed Consent Completed: No  
STS Score: 1.4%  
CAD: Yes - CAD on Problem List: Yes  
Is intended procedure a CABG: Yes - is a beta blocker ordered? Yes  
  
H & P completed: Yes  
  
PA/LAT: Completed CT: Completed MRI: N/A LE US: N/A  
  
Cath: Yes - reviewed: Yes Echo:Completed EKG: Completed EF %: 61  
  
PI's: N/A Carotid: Completed Mapping: N/A Dental: Completed PFT's: N/A  
  
Basename 11 0729  
WBC 7.18  
HB 13.1  
HCT 41.8  
  
INR 1.0  
CREAT 1.35  
  
UA: Normal  
HCG:N/A  
  
ABO/ABO Confirmed: Yes  
  
Blood ordered: No  
  
SA Swab: Yes - results: Pending  
  
Last Dose of Anticoagulation: vitamins  
  
Op Note: N/A  
  
Pacemaker Check: N/A  
  
Consults: GI 2001  
  
DM: No  
  
Cardiac Surgical prep: No  
  
SIGNATURE: Krystal Castellanos RN CHECKED BY:  
DATE of SERVICE: 2011  
TIME of SERVICE: 2:23 PM  
  
  
   
   
                                Anemia of chronic disease 2011  
   
                                Intestinal polyp 2011  
   
                                                      
  
  
Overview:Formatting of this note might be different from the original.  
Distal ileum ulcerated polyp.  
   
   
                                Nonspecific abnormal results of liver function s  
tudy 2006  
   
                                Impotence of organic origin 2006  
   
                                Obesity, Class III, BMI 40-49.9 (morbid obesity)  
                 2021  
  
documented as of this encounter (statuses as of 2022)  
[facility]- History of Past illness Narrative*   
  
                                Problem         Noted Date      Resolved Date  
   
                                Medicare annual wellness visit, subsequent 2019  
   
                                Encounter for long-term (current) use of medicat  
ions 2018  
   
                                                      
  
  
Overview:Formatting of this note might be different from the original.  
Added automatically from request for surgery [id number]  
   
   
                                History of colonic polyps 2018  
   
                                                      
  
  
Overview:Formatting of this note might be different from the original.  
Added automatically from request for surgery [id number]  
   
   
                                Gastroesophageal reflux disease 2018  
   
                                                      
  
  
Overview:Formatting of this note might be different from the original.  
Added automatically from request for surgery [id number]  
   
   
                                Acute pulmonary edema 10/05/2011      10/06/2011  
   
                                                      
  
  
Overview:Formatting of this note might be different from the original.  
10/5/2011  
cardiogenic and noncardiogenic factors  
  
   
   
                                Atelectasis     10/05/2011      10/06/2011  
   
                                Hypotension     10/04/2011      10/06/2011  
   
                                                      
  
  
Overview:Formatting of this note might be different from the original.  
10/4/2011  
goal map 65-80  
   
   
                                Stress hyperglycemia 10/04/2011      10/06/2011  
   
                                                      
  
  
Overview:Formatting of this note might be different from the original.  
10/4/2011  
Perioperative insulin resistance and exacerbation of hyperglycemia.  
Control blood glucose with titration of insulin infusion  
  
10/5/2011  
adequate control  
goal FBG<180  
  
   
   
                                Post-op pain    10/04/2011      10/06/2011  
   
                                Mechanically assisted ventilation 10/04/2011      
  10/05/2011  
   
                                                      
  
  
Overview:Formatting of this note might be different from the original.  
10/4/2011  
optimize MV settings, WTE  
current setting:FiO2, 75%, peep 8  
   
   
                                Cardiac insufficiency 10/04/2011      10/05/2011  
   
                                                      
  
  
Overview:Formatting of this note might be different from the original.  
10/4/2011  
RV mild to moderate post pump  
goal CI>2.3  
   
   
                                Hypoxemia       10/04/2011      10/06/2011  
   
                                discharge planning 2011      10/19/2011  
   
                                                      
  
  
Overview:Formatting of this note might be different from the original.  
age 71,  lives in [place], [place]. No DC needs.  
/  
   
   
                                Pre-op testing  2011      10/04/2011  
   
                                                      
  
  
Overview:Formatting of this note is different from the original.  
Images from the original note were not included.  
[facility]  
PRE-OP CHECKLIST  
  
Surgeon: Deangelo Angulo M.D. Informed Consent Completed: No  
STS Score: 1.4%  
CAD: Yes - CAD on Problem List: Yes  
Is intended procedure a CABG: Yes - is a beta blocker ordered? Yes  
  
H & P completed: Yes  
  
PA/LAT: Completed CT: Completed MRI: N/A LE US: N/A  
  
Cath: Yes - reviewed: Yes Echo:Completed EKG: Completed EF %: 61  
  
PI's: N/A Carotid: Completed Mapping: N/A Dental: Completed PFT's: N/A  
  
Basename 11 0729  
WBC 7.18  
HB 13.1  
HCT 41.8  
  
INR 1.0  
CREAT 1.35  
  
UA: Normal  
HCG:N/A  
  
ABO/ABO Confirmed: Yes  
  
Blood ordered: No  
  
SA Swab: Yes - results: Pending  
  
Last Dose of Anticoagulation: vitamins  
  
Op Note: N/A  
  
Pacemaker Check: N/A  
  
Consults: GI 2001  
  
DM: No  
  
Cardiac Surgical prep: No  
  
SIGNATURE: Krystal Castellanos RN CHECKED BY:  
DATE of SERVICE: 2011  
TIME of SERVICE: 2:23 PM  
  
  
   
   
                                Anemia of chronic disease 2011  
   
                                Intestinal polyp 2011  
   
                                                      
  
  
Overview:Formatting of this note might be different from the original.  
Distal ileum ulcerated polyp.  
   
   
                                Nonspecific abnormal results of liver function s  
tudy 2006  
   
                                Impotence of organic origin 2006  
   
                                Obesity, Class III, BMI 40-49.9 (morbid obesity)  
                 2021  
  
documented as of this encounter (statuses as of 2022)  
[facility]- History of Past illness Narrative*   
  
                                Problem         Noted Date      Resolved Date  
   
                                Medicare annual wellness visit, subsequent 2019  
   
                                Encounter for long-term (current) use of medicat  
ions 2018  
   
                                                      
  
  
Overview:Formatting of this note might be different from the original.  
Added automatically from request for surgery [id number]  
   
   
                                History of colonic polyps 2018  
   
                                                      
  
  
Overview:Formatting of this note might be different from the original.  
Added automatically from request for surgery [id number]  
   
   
                                Gastroesophageal reflux disease 2018  
   
                                                      
  
  
Overview:Formatting of this note might be different from the original.  
Added automatically from request for surgery [id number]  
   
   
                                Acute pulmonary edema 10/05/2011      10/06/2011  
   
                                                      
  
  
Overview:Formatting of this note might be different from the original.  
10/5/2011  
cardiogenic and noncardiogenic factors  
  
   
   
                                Atelectasis     10/05/2011      10/06/2011  
   
                                Hypotension     10/04/2011      10/06/2011  
   
                                                      
  
  
Overview:Formatting of this note might be different from the original.  
10/4/2011  
goal map 65-80  
   
   
                                Stress hyperglycemia 10/04/2011      10/06/2011  
   
                                                      
  
  
Overview:Formatting of this note might be different from the original.  
10/4/2011  
Perioperative insulin resistance and exacerbation of hyperglycemia.  
Control blood glucose with titration of insulin infusion  
  
10/5/2011  
adequate control  
goal FBG<180  
  
   
   
                                Post-op pain    10/04/2011      10/06/2011  
   
                                Mechanically assisted ventilation 10/04/2011      
  10/05/2011  
   
                                                      
  
  
Overview:Formatting of this note might be different from the original.  
10/4/2011  
optimize MV settings, WTE  
current setting:FiO2, 75%, peep 8  
   
   
                                Cardiac insufficiency 10/04/2011      10/05/2011  
   
                                                      
  
  
Overview:Formatting of this note might be different from the original.  
10/4/2011  
RV mild to moderate post pump  
goal CI>2.3  
   
   
                                Hypoxemia       10/04/2011      10/06/2011  
   
                                discharge planning 2011      10/19/2011  
   
                                                      
  
  
Overview:Formatting of this note might be different from the original.  
age 71,  lives in [place], [place]. No DC needs.  
/  
   
   
                                Pre-op testing  2011      10/04/2011  
   
                                                      
  
  
Overview:Formatting of this note is different from the original.  
Images from the original note were not included.  
[facility]  
PRE-OP CHECKLIST  
  
Surgeon: Deangelo Angulo M.D. Informed Consent Completed: No  
STS Score: 1.4%  
CAD: Yes - CAD on Problem List: Yes  
Is intended procedure a CABG: Yes - is a beta blocker ordered? Yes  
  
H & P completed: Yes  
  
PA/LAT: Completed CT: Completed MRI: N/A LE US: N/A  
  
Cath: Yes - reviewed: Yes Echo:Completed EKG: Completed EF %: 61  
  
PI's: N/A Carotid: Completed Mapping: N/A Dental: Completed PFT's: N/A  
  
Basename 11 0729  
WBC 7.18  
HB 13.1  
HCT 41.8  
  
INR 1.0  
CREAT 1.35  
  
UA: Normal  
HCG:N/A  
  
ABO/ABO Confirmed: Yes  
  
Blood ordered: No  
  
SA Swab: Yes - results: Pending  
  
Last Dose of Anticoagulation: vitamins  
  
Op Note: N/A  
  
Pacemaker Check: N/A  
  
Consults: GI 2001  
  
DM: No  
  
Cardiac Surgical prep: No  
  
SIGNATURE: Krystal Castellanos RN CHECKED BY:  
DATE of SERVICE: 2011  
TIME of SERVICE: 2:23 PM  
  
  
   
   
                                Anemia of chronic disease 2011  
   
                                Intestinal polyp 2011  
   
                                                      
  
  
Overview:Formatting of this note might be different from the original.  
Distal ileum ulcerated polyp.  
   
   
                                Nonspecific abnormal results of liver function s  
pepe 2006  
   
                                Impotence of organic origin 2006  
   
                                Obesity, Class III, BMI 40-49.9 (morbid obesity)  
                 2021  
  
documented as of this encounter (statuses as of 2022)  
[facility]06- History of Past illness Narrative*   
  
                                Problem         Noted Date      Resolved Date  
   
                                Medicare annual wellness visit, subsequent 2019  
   
                                Encounter for long-term (current) use of medicat  
ions 2018  
   
                                                      
  
  
Overview:Formatting of this note might be different from the original.  
Added automatically from request for surgery [id number]  
   
   
                                History of colonic polyps 2018  
   
                                                      
  
  
Overview:Formatting of this note might be different from the original.  
Added automatically from request for surgery [id number]  
   
   
                                Gastroesophageal reflux disease 2018  
   
                                                      
  
  
Overview:Formatting of this note might be different from the original.  
Added automatically from request for surgery [id number]  
   
   
                                Acute pulmonary edema 10/05/2011      10/06/2011  
   
                                                      
  
  
Overview:Formatting of this note might be different from the original.  
10/5/2011  
cardiogenic and noncardiogenic factors  
  
   
   
                                Atelectasis     10/05/2011      10/06/2011  
   
                                Hypotension     10/04/2011      10/06/2011  
   
                                                      
  
  
Overview:Formatting of this note might be different from the original.  
10/4/2011  
goal map 65-80  
   
   
                                Stress hyperglycemia 10/04/2011      10/06/2011  
   
                                                      
  
  
Overview:Formatting of this note might be different from the original.  
10/4/2011  
Perioperative insulin resistance and exacerbation of hyperglycemia.  
Control blood glucose with titration of insulin infusion  
  
10/5/2011  
adequate control  
goal FBG<180  
  
   
   
                                Post-op pain    10/04/2011      10/06/2011  
   
                                Mechanically assisted ventilation 10/04/2011      
  10/05/2011  
   
                                                      
  
  
Overview:Formatting of this note might be different from the original.  
10/4/2011  
optimize MV settings, WTE  
current setting:FiO2, 75%, peep 8  
   
   
                                Cardiac insufficiency 10/04/2011      10/05/2011  
   
                                                      
  
  
Overview:Formatting of this note might be different from the original.  
10/4/2011  
RV mild to moderate post pump  
goal CI>2.3  
   
   
                                Hypoxemia       10/04/2011      10/06/2011  
   
                                discharge planning 2011      10/19/2011  
   
                                                      
  
  
Overview:Formatting of this note might be different from the original.  
age 71,  lives in [place], [place]. No DC needs.  
/  
   
   
                                Pre-op testing  2011      10/04/2011  
   
                                                      
  
  
Overview:Formatting of this note is different from the original.  
Images from the original note were not included.  
[facility]  
PRE-OP CHECKLIST  
  
Surgeon: Deangelo Angulo M.D. Informed Consent Completed: No  
STS Score: 1.4%  
CAD: Yes - CAD on Problem List: Yes  
Is intended procedure a CABG: Yes - is a beta blocker ordered? Yes  
  
H & P completed: Yes  
  
PA/LAT: Completed CT: Completed MRI: N/A LE US: N/A  
  
Cath: Yes - reviewed: Yes Echo:Completed EKG: Completed EF %: 61  
  
PI's: N/A Carotid: Completed Mapping: N/A Dental: Completed PFT's: N/A  
  
Basename 11 0729  
WBC 7.18  
HB 13.1  
HCT 41.8  
  
INR 1.0  
CREAT 1.35  
  
UA: Normal  
HCG:N/A  
  
ABO/ABO Confirmed: Yes  
  
Blood ordered: No  
  
SA Swab: Yes - results: Pending  
  
Last Dose of Anticoagulation: vitamins  
  
Op Note: N/A  
  
Pacemaker Check: N/A  
  
Consults: GI 2001  
  
DM: No  
  
Cardiac Surgical prep: No  
  
SIGNATURE: Krystal Castellanos RN CHECKED BY:  
DATE of SERVICE: 2011  
TIME of SERVICE: 2:23 PM  
  
  
   
   
                                Anemia of chronic disease 2011  
   
                                Intestinal polyp 2011  
   
                                                      
  
  
Overview:Formatting of this note might be different from the original.  
Distal ileum ulcerated polyp.  
   
   
                                Nonspecific abnormal results of liver function s  
pepe 2006  
   
                                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  
2015  
   
                                Obesity, Class III, BMI 40-49.9 (morbid obesity)  
                 2021  
  
documented as of this encounter (statuses as of 2022)  
[facility]06- History of Past illness Narrative*   
  
                                Problem         Noted Date      Resolved Date  
   
                                Medicare annual wellness visit, subsequent 2019  
   
                                Encounter for long-term (current) use of medicat  
ions 2018  
   
                                                      
  
  
Overview:Formatting of this note might be different from the original.  
Added automatically from request for surgery [id number]  
   
   
                                History of colonic polyps 2018  
   
                                                      
  
  
Overview:Formatting of this note might be different from the original.  
Added automatically from request for surgery [id number]  
   
   
                                Gastroesophageal reflux disease 2018  
   
                                                      
  
  
Overview:Formatting of this note might be different from the original.  
Added automatically from request for surgery [id number]  
   
   
                                Acute pulmonary edema 10/05/2011      10/06/2011  
   
                                                      
  
  
Overview:Formatting of this note might be different from the original.  
10/5/2011  
cardiogenic and noncardiogenic factors  
  
   
   
                                Atelectasis     10/05/2011      10/06/2011  
   
                                Hypotension     10/04/2011      10/06/2011  
   
                                                      
  
  
Overview:Formatting of this note might be different from the original.  
10/4/2011  
goal map 65-80  
   
   
                                Stress hyperglycemia 10/04/2011      10/06/2011  
   
                                                      
  
  
Overview:Formatting of this note might be different from the original.  
10/4/2011  
Perioperative insulin resistance and exacerbation of hyperglycemia.  
Control blood glucose with titration of insulin infusion  
  
10/5/2011  
adequate control  
goal FBG<180  
  
   
   
                                Post-op pain    10/04/2011      10/06/2011  
   
                                Mechanically assisted ventilation 10/04/2011      
  10/05/2011  
   
                                                      
  
  
Overview:Formatting of this note might be different from the original.  
10/4/2011  
optimize MV settings, WTE  
current setting:FiO2, 75%, peep 8  
   
   
                                Cardiac insufficiency 10/04/2011      10/05/2011  
   
                                                      
  
  
Overview:Formatting of this note might be different from the original.  
10/4/2011  
RV mild to moderate post pump  
goal CI>2.3  
   
   
                                Hypoxemia       10/04/2011      10/06/2011  
   
                                discharge planning 2011      10/19/2011  
   
                                                      
  
  
Overview:Formatting of this note might be different from the original.  
age 71,  lives in [place], [place]. No DC needs.  
/  
   
   
                                Pre-op testing  2011      10/04/2011  
   
                                                      
  
  
Overview:Formatting of this note is different from the original.  
Images from the original note were not included.  
[facility]  
PRE-OP CHECKLIST  
  
Surgeon: Deangelo Angulo M.D. Informed Consent Completed: No  
STS Score: 1.4%  
CAD: Yes - CAD on Problem List: Yes  
Is intended procedure a CABG: Yes - is a beta blocker ordered? Yes  
  
H & P completed: Yes  
  
PA/LAT: Completed CT: Completed MRI: N/A LE US: N/A  
  
Cath: Yes - reviewed: Yes Echo:Completed EKG: Completed EF %: 61  
  
PI's: N/A Carotid: Completed Mapping: N/A Dental: Completed PFT's: N/A  
  
Basename 11 0729  
WBC 7.18  
HB 13.1  
HCT 41.8  
  
INR 1.0  
CREAT 1.35  
  
UA: Normal  
HCG:N/A  
  
ABO/ABO Confirmed: Yes  
  
Blood ordered: No  
  
SA Swab: Yes - results: Pending  
  
Last Dose of Anticoagulation: vitamins  
  
Op Note: N/A  
  
Pacemaker Check: N/A  
  
Consults: GI 2001  
  
DM: No  
  
Cardiac Surgical prep: No  
  
SIGNATURE: Krystal Castellanos RN CHECKED BY:  
DATE of SERVICE: 2011  
TIME of SERVICE: 2:23 PM  
  
  
   
   
                                Anemia of chronic disease 2011  
   
                                Intestinal polyp 2011  
   
                                                      
  
  
Overview:Formatting of this note might be different from the original.  
Distal ileum ulcerated polyp.  
   
   
                                Nonspecific abnormal results of liver function s  
tudy 2006  
   
                                Impotence of organic origin 2006  
   
                                Obesity, Class III, BMI 40-49.9 (morbid obesity)  
                 2021  
  
documented as of this encounter (statuses as of 2022)  
[facility]- History of Past illness Narrative*   
  
                                Problem         Noted Date      Resolved Date  
   
                                Medicare annual wellness visit, subsequent 2019  
   
                                Encounter for long-term (current) use of medicat  
ions 2018  
   
                                                      
  
  
Overview:Formatting of this note might be different from the original.  
Added automatically from request for surgery [id number]  
   
   
                                History of colonic polyps 2018  
   
                                                      
  
  
Overview:Formatting of this note might be different from the original.  
Added automatically from request for surgery [id number]  
   
   
                                Gastroesophageal reflux disease 2018  
   
                                                      
  
  
Overview:Formatting of this note might be different from the original.  
Added automatically from request for surgery [id number]  
   
   
                                Acute pulmonary edema 10/05/2011      10/06/2011  
   
                                                      
  
  
Overview:Formatting of this note might be different from the original.  
10/5/2011  
cardiogenic and noncardiogenic factors  
  
   
   
                                Atelectasis     10/05/2011      10/06/2011  
   
                                Hypotension     10/04/2011      10/06/2011  
   
                                                      
  
  
Overview:Formatting of this note might be different from the original.  
10/4/2011  
goal map 65-80  
   
   
                                Stress hyperglycemia 10/04/2011      10/06/2011  
   
                                                      
  
  
Overview:Formatting of this note might be different from the original.  
10/4/2011  
Perioperative insulin resistance and exacerbation of hyperglycemia.  
Control blood glucose with titration of insulin infusion  
  
10/5/2011  
adequate control  
goal FBG<180  
  
   
   
                                Post-op pain    10/04/2011      10/06/2011  
   
                                Mechanically assisted ventilation 10/04/2011      
  10/05/2011  
   
                                                      
  
  
Overview:Formatting of this note might be different from the original.  
10/4/2011  
optimize MV settings, WTE  
current setting:FiO2, 75%, peep 8  
   
   
                                Cardiac insufficiency 10/04/2011      10/05/2011  
   
                                                      
  
  
Overview:Formatting of this note might be different from the original.  
10/4/2011  
RV mild to moderate post pump  
goal CI>2.3  
   
   
                                Hypoxemia       10/04/2011      10/06/2011  
   
                                discharge planning 2011      10/19/2011  
   
                                                      
  
  
Overview:Formatting of this note might be different from the original.  
age 71,  lives in [place], [place]. No DC needs.  
/  
   
   
                                Pre-op testing  2011      10/04/2011  
   
                                                      
  
  
Overview:Formatting of this note is different from the original.  
Images from the original note were not included.  
[facility]  
PRE-OP CHECKLIST  
  
Surgeon: Deangelo Angulo M.D. Informed Consent Completed: No  
STS Score: 1.4%  
CAD: Yes - CAD on Problem List: Yes  
Is intended procedure a CABG: Yes - is a beta blocker ordered? Yes  
  
H & P completed: Yes  
  
PA/LAT: Completed CT: Completed MRI: N/A LE US: N/A  
  
Cath: Yes - reviewed: Yes Echo:Completed EKG: Completed EF %: 61  
  
PI's: N/A Carotid: Completed Mapping: N/A Dental: Completed PFT's: N/A  
  
Basename 11 0729  
WBC 7.18  
HB 13.1  
HCT 41.8  
  
INR 1.0  
CREAT 1.35  
  
UA: Normal  
HCG:N/A  
  
ABO/ABO Confirmed: Yes  
  
Blood ordered: No  
  
SA Swab: Yes - results: Pending  
  
Last Dose of Anticoagulation: vitamins  
  
Op Note: N/A  
  
Pacemaker Check: N/A  
  
Consults: GI 2001  
  
DM: No  
  
Cardiac Surgical prep: No  
  
SIGNATURE: Krystal Castellanos RN CHECKED BY:  
DATE of SERVICE: 2011  
TIME of SERVICE: 2:23 PM  
  
  
   
   
                                Anemia of chronic disease 2011  
   
                                Intestinal polyp 2011  
   
                                                      
  
  
Overview:Formatting of this note might be different from the original.  
Distal ileum ulcerated polyp.  
   
   
                                Nonspecific abnormal results of liver function s  
tudy 2006  
   
                                Impotence of organic origin 2006  
   
                                Obesity, Class III, BMI 40-49.9 (morbid obesity)  
                 2021  
  
documented as of this encounter (statuses as of 2022)  
[facility]- History of Past illness Narrative*   
  
                                Problem         Noted Date      Resolved Date  
   
                                Medicare annual wellness visit, subsequent 2019  
   
                                Encounter for long-term (current) use of medicat  
ions 2018  
   
                                                      
  
  
Overview:Formatting of this note might be different from the original.  
Added automatically from request for surgery [id number]  
   
   
                                History of colonic polyps 2018  
   
                                                      
  
  
Overview:Formatting of this note might be different from the original.  
Added automatically from request for surgery [id number]  
   
   
                                Gastroesophageal reflux disease 2018  
   
                                                      
  
  
Overview:Formatting of this note might be different from the original.  
Added automatically from request for surgery [id number]  
   
   
                                Acute pulmonary edema 10/05/2011      10/06/2011  
   
                                                      
  
  
Overview:Formatting of this note might be different from the original.  
10/5/2011  
cardiogenic and noncardiogenic factors  
  
   
   
                                Atelectasis     10/05/2011      10/06/2011  
   
                                Hypotension     10/04/2011      10/06/2011  
   
                                                      
  
  
Overview:Formatting of this note might be different from the original.  
10/4/2011  
goal map 65-80  
   
   
                                Stress hyperglycemia 10/04/2011      10/06/2011  
   
                                                      
  
  
Overview:Formatting of this note might be different from the original.  
10/4/2011  
Perioperative insulin resistance and exacerbation of hyperglycemia.  
Control blood glucose with titration of insulin infusion  
  
10/5/2011  
adequate control  
goal FBG<180  
  
   
   
                                Post-op pain    10/04/2011      10/06/2011  
   
                                Mechanically assisted ventilation 10/04/2011      
  10/05/2011  
   
                                                      
  
  
Overview:Formatting of this note might be different from the original.  
10/4/2011  
optimize MV settings, WTE  
current setting:FiO2, 75%, peep 8  
   
   
                                Cardiac insufficiency 10/04/2011      10/05/2011  
   
                                                      
  
  
Overview:Formatting of this note might be different from the original.  
10/4/2011  
RV mild to moderate post pump  
goal CI>2.3  
   
   
                                Hypoxemia       10/04/2011      10/06/2011  
   
                                discharge planning 2011      10/19/2011  
   
                                                      
  
  
Overview:Formatting of this note might be different from the original.  
age 71,  lives in [place], [place]. No DC needs.  
/  
   
   
                                Pre-op testing  2011      10/04/2011  
   
                                                      
  
  
Overview:Formatting of this note is different from the original.  
Images from the original note were not included.  
[facility]  
PRE-OP CHECKLIST  
  
Surgeon: Deangelo Angulo M.D. Informed Consent Completed: No  
STS Score: 1.4%  
CAD: Yes - CAD on Problem List: Yes  
Is intended procedure a CABG: Yes - is a beta blocker ordered? Yes  
  
H & P completed: Yes  
  
PA/LAT: Completed CT: Completed MRI: N/A LE US: N/A  
  
Cath: Yes - reviewed: Yes Echo:Completed EKG: Completed EF %: 61  
  
PI's: N/A Carotid: Completed Mapping: N/A Dental: Completed PFT's: N/A  
  
Basename 11 0729  
WBC 7.18  
HB 13.1  
HCT 41.8  
  
INR 1.0  
CREAT 1.35  
  
UA: Normal  
HCG:N/A  
  
ABO/ABO Confirmed: Yes  
  
Blood ordered: No  
  
SA Swab: Yes - results: Pending  
  
Last Dose of Anticoagulation: vitamins  
  
Op Note: N/A  
  
Pacemaker Check: N/A  
  
Consults: GI 2001  
  
DM: No  
  
Cardiac Surgical prep: No  
  
SIGNATURE: Krystal Castellanos RN CHECKED BY:  
DATE of SERVICE: 2011  
TIME of SERVICE: 2:23 PM  
  
  
   
   
                                Anemia of chronic disease 2011  
   
                                Intestinal polyp 2011  
   
                                                      
  
  
Overview:Formatting of this note might be different from the original.  
Distal ileum ulcerated polyp.  
   
   
                                Nonspecific abnormal results of liver function s  
nialldy 2006  
   
                                Impotence of organic origin 2006  
   
                                Obesity, Class III, BMI 40-49.9 (morbid obesity)  
                 2021  
  
documented as of this encounter (statuses as of 2022)  
[facility]- History of Past illness Narrative*   
  
                                Problem         Noted Date      Resolved Date  
   
                                Medicare annual wellness visit, subsequent 2019  
   
                                Encounter for long-term (current) use of medicat  
ions 2018  
   
                                                      
  
  
Overview:Formatting of this note might be different from the original.  
Added automatically from request for surgery [id number]  
   
   
                                History of colonic polyps 2018  
   
                                                      
  
  
Overview:Formatting of this note might be different from the original.  
Added automatically from request for surgery [id number]  
   
   
                                Gastroesophageal reflux disease 2018  
   
                                                      
  
  
Overview:Formatting of this note might be different from the original.  
Added automatically from request for surgery [id number]  
   
   
                                Acute pulmonary edema 10/05/2011      10/06/2011  
   
                                                      
  
  
Overview:Formatting of this note might be different from the original.  
10/5/2011  
cardiogenic and noncardiogenic factors  
  
   
   
                                Atelectasis     10/05/2011      10/06/2011  
   
                                Hypotension     10/04/2011      10/06/2011  
   
                                                      
  
  
Overview:Formatting of this note might be different from the original.  
10/4/2011  
goal map 65-80  
   
   
                                Stress hyperglycemia 10/04/2011      10/06/2011  
   
                                                      
  
  
Overview:Formatting of this note might be different from the original.  
10/4/2011  
Perioperative insulin resistance and exacerbation of hyperglycemia.  
Control blood glucose with titration of insulin infusion  
  
10/5/2011  
adequate control  
goal FBG<180  
  
   
   
                                Post-op pain    10/04/2011      10/06/2011  
   
                                Mechanically assisted ventilation 10/04/2011      
  10/05/2011  
   
                                                      
  
  
Overview:Formatting of this note might be different from the original.  
10/4/2011  
optimize MV settings, WTE  
current setting:FiO2, 75%, peep 8  
   
   
                                Cardiac insufficiency 10/04/2011      10/05/2011  
   
                                                      
  
  
Overview:Formatting of this note might be different from the original.  
10/4/2011  
RV mild to moderate post pump  
goal CI>2.3  
   
   
                                Hypoxemia       10/04/2011      10/06/2011  
   
                                discharge planning 2011      10/19/2011  
   
                                                      
  
  
Overview:Formatting of this note might be different from the original.  
age 71,  lives in [place], [place]. No DC needs.  
/  
   
   
                                Pre-op testing  2011      10/04/2011  
   
                                                      
  
  
Overview:Formatting of this note is different from the original.  
Images from the original note were not included.  
[facility]  
PRE-OP CHECKLIST  
  
Surgeon: Deangelo Angulo M.D. Informed Consent Completed: No  
STS Score: 1.4%  
CAD: Yes - CAD on Problem List: Yes  
Is intended procedure a CABG: Yes - is a beta blocker ordered? Yes  
  
H & P completed: Yes  
  
PA/LAT: Completed CT: Completed MRI: N/A LE US: N/A  
  
Cath: Yes - reviewed: Yes Echo:Completed EKG: Completed EF %: 61  
  
PI's: N/A Carotid: Completed Mapping: N/A Dental: Completed PFT's: N/A  
  
Basename 11 0729  
WBC 7.18  
HB 13.1  
HCT 41.8  
  
INR 1.0  
CREAT 1.35  
  
UA: Normal  
HCG:N/A  
  
ABO/ABO Confirmed: Yes  
  
Blood ordered: No  
  
SA Swab: Yes - results: Pending  
  
Last Dose of Anticoagulation: vitamins  
  
Op Note: N/A  
  
Pacemaker Check: N/A  
  
Consults: GI 2001  
  
DM: No  
  
Cardiac Surgical prep: No  
  
SIGNATURE: Krystal Castellanos RN CHECKED BY:  
DATE of SERVICE: 2011  
TIME of SERVICE: 2:23 PM  
  
  
   
   
                                Anemia of chronic disease 2011  
   
                                Intestinal polyp 2011  
   
                                                      
  
  
Overview:Formatting of this note might be different from the original.  
Distal ileum ulcerated polyp.  
   
   
                                Nonspecific abnormal results of liver function s  
pepe 2006  
   
                                Impotence of organic origin 2006  
   
                                Obesity, Class III, BMI 40-49.9 (morbid obesity)  
                 2021  
  
documented as of this encounter (statuses as of 2022)  
[facility]06- History of Past illness Narrative*   
  
                                Problem         Noted Date      Resolved Date  
   
                                Medicare annual wellness visit, subsequent 2019  
   
                                Encounter for long-term (current) use of medicat  
ions 2018  
   
                                                      
  
  
Overview:Formatting of this note might be different from the original.  
Added automatically from request for surgery [id number]  
   
   
                                History of colonic polyps 2018  
   
                                                      
  
  
Overview:Formatting of this note might be different from the original.  
Added automatically from request for surgery [id number]  
   
   
                                Gastroesophageal reflux disease 2018  
   
                                                      
  
  
Overview:Formatting of this note might be different from the original.  
Added automatically from request for surgery [id number]  
   
   
                                Acute pulmonary edema 10/05/2011      10/06/2011  
   
                                                      
  
  
Overview:Formatting of this note might be different from the original.  
10/5/2011  
cardiogenic and noncardiogenic factors  
  
   
   
                                Atelectasis     10/05/2011      10/06/2011  
   
                                Hypotension     10/04/2011      10/06/2011  
   
                                                      
  
  
Overview:Formatting of this note might be different from the original.  
10/4/2011  
goal map 65-80  
   
   
                                Stress hyperglycemia 10/04/2011      10/06/2011  
   
                                                      
  
  
Overview:Formatting of this note might be different from the original.  
10/4/2011  
Perioperative insulin resistance and exacerbation of hyperglycemia.  
Control blood glucose with titration of insulin infusion  
  
10/5/2011  
adequate control  
goal FBG<180  
  
   
   
                                Post-op pain    10/04/2011      10/06/2011  
   
                                Mechanically assisted ventilation 10/04/2011      
  10/05/2011  
   
                                                      
  
  
Overview:Formatting of this note might be different from the original.  
10/4/2011  
optimize MV settings, WTE  
current setting:FiO2, 75%, peep 8  
   
   
                                Cardiac insufficiency 10/04/2011      10/05/2011  
   
                                                      
  
  
Overview:Formatting of this note might be different from the original.  
10/4/2011  
RV mild to moderate post pump  
goal CI>2.3  
   
   
                                Hypoxemia       10/04/2011      10/06/2011  
   
                                discharge planning 2011      10/19/2011  
   
                                                      
  
  
Overview:Formatting of this note might be different from the original.  
age 71,  lives in [place], [place]. No DC needs.  
/  
   
   
                                Pre-op testing  2011      10/04/2011  
   
                                                      
  
  
Overview:Formatting of this note is different from the original.  
Images from the original note were not included.  
[facility]  
PRE-OP CHECKLIST  
  
Surgeon: Deangelo Angulo M.D. Informed Consent Completed: No  
STS Score: 1.4%  
CAD: Yes - CAD on Problem List: Yes  
Is intended procedure a CABG: Yes - is a beta blocker ordered? Yes  
  
H & P completed: Yes  
  
PA/LAT: Completed CT: Completed MRI: N/A LE US: N/A  
  
Cath: Yes - reviewed: Yes Echo:Completed EKG: Completed EF %: 61  
  
PI's: N/A Carotid: Completed Mapping: N/A Dental: Completed PFT's: N/A  
  
Basename 11 0729  
WBC 7.18  
HB 13.1  
HCT 41.8  
  
INR 1.0  
CREAT 1.35  
  
UA: Normal  
HCG:N/A  
  
ABO/ABO Confirmed: Yes  
  
Blood ordered: No  
  
SA Swab: Yes - results: Pending  
  
Last Dose of Anticoagulation: vitamins  
  
Op Note: N/A  
  
Pacemaker Check: N/A  
  
Consults: GI 2001  
  
DM: No  
  
Cardiac Surgical prep: No  
  
SIGNATURE: Krystal Castellanos RN CHECKED BY:  
DATE of SERVICE: 2011  
TIME of SERVICE: 2:23 PM  
  
  
   
   
                                Anemia of chronic disease 2011  
   
                                Intestinal polyp 2011  
   
                                                      
  
  
Overview:Formatting of this note might be different from the original.  
Distal ileum ulcerated polyp.  
   
   
                                Nonspecific abnormal results of liver function s  
tudy 2006  
   
                                Impotence of organic origin 2006  
   
                                Obesity, Class III, BMI 40-49.9 (morbid obesity)  
                 2021  
  
documented as of this encounter (statuses as of 2022)  
[facility]- History of Past illness Narrative*   
  
                                Problem         Noted Date      Resolved Date  
   
                                Medicare annual wellness visit, subsequent 2019  
   
                                Encounter for long-term (current) use of medicat  
ions 2018  
   
                                                      
  
  
Overview:Formatting of this note might be different from the original.  
Added automatically from request for surgery [id number]  
   
   
                                History of colonic polyps 2018  
   
                                                      
  
  
Overview:Formatting of this note might be different from the original.  
Added automatically from request for surgery [id number]  
   
   
                                Gastroesophageal reflux disease 2018  
   
                                                      
  
  
Overview:Formatting of this note might be different from the original.  
Added automatically from request for surgery [id number]  
   
   
                                Acute pulmonary edema 10/05/2011      10/06/2011  
   
                                                      
  
  
Overview:Formatting of this note might be different from the original.  
10/5/2011  
cardiogenic and noncardiogenic factors  
  
   
   
                                Atelectasis     10/05/2011      10/06/2011  
   
                                Hypotension     10/04/2011      10/06/2011  
   
                                                      
  
  
Overview:Formatting of this note might be different from the original.  
10/4/2011  
goal map 65-80  
   
   
                                Stress hyperglycemia 10/04/2011      10/06/2011  
   
                                                      
  
  
Overview:Formatting of this note might be different from the original.  
10/4/2011  
Perioperative insulin resistance and exacerbation of hyperglycemia.  
Control blood glucose with titration of insulin infusion  
  
10/5/2011  
adequate control  
goal FBG<180  
  
   
   
                                Post-op pain    10/04/2011      10/06/2011  
   
                                Mechanically assisted ventilation 10/04/2011      
  10/05/2011  
   
                                                      
  
  
Overview:Formatting of this note might be different from the original.  
10/4/2011  
optimize MV settings, WTE  
current setting:FiO2, 75%, peep 8  
   
   
                                Cardiac insufficiency 10/04/2011      10/05/2011  
   
                                                      
  
  
Overview:Formatting of this note might be different from the original.  
10/4/2011  
RV mild to moderate post pump  
goal CI>2.3  
   
   
                                Hypoxemia       10/04/2011      10/06/2011  
   
                                discharge planning 2011      10/19/2011  
   
                                                      
  
  
Overview:Formatting of this note might be different from the original.  
age 71,  lives in [place], [place]. No DC needs.  
/  
   
   
                                Pre-op testing  2011      10/04/2011  
   
                                                      
  
  
Overview:Formatting of this note is different from the original.  
Images from the original note were not included.  
[facility]  
PRE-OP CHECKLIST  
  
Surgeon: Deangelo Angulo M.D. Informed Consent Completed: No  
STS Score: 1.4%  
CAD: Yes - CAD on Problem List: Yes  
Is intended procedure a CABG: Yes - is a beta blocker ordered? Yes  
  
H & P completed: Yes  
  
PA/LAT: Completed CT: Completed MRI: N/A LE US: N/A  
  
Cath: Yes - reviewed: Yes Echo:Completed EKG: Completed EF %: 61  
  
PI's: N/A Carotid: Completed Mapping: N/A Dental: Completed PFT's: N/A  
  
Basename 11 0729  
WBC 7.18  
HB 13.1  
HCT 41.8  
  
INR 1.0  
CREAT 1.35  
  
UA: Normal  
HCG:N/A  
  
ABO/ABO Confirmed: Yes  
  
Blood ordered: No  
  
SA Swab: Yes - results: Pending  
  
Last Dose of Anticoagulation: vitamins  
  
Op Note: N/A  
  
Pacemaker Check: N/A  
  
Consults: GI 2001  
  
DM: No  
  
Cardiac Surgical prep: No  
  
SIGNATURE: Krystal Castellanos RN CHECKED BY:  
DATE of SERVICE: 2011  
TIME of SERVICE: 2:23 PM  
  
  
   
   
                                Anemia of chronic disease 2011  
   
                                Intestinal polyp 2011  
   
                                                      
  
  
Overview:Formatting of this note might be different from the original.  
Distal ileum ulcerated polyp.  
   
   
                                Nonspecific abnormal results of liver function s  
tudy 2006  
   
                                Impotence of organic origin 2006  
   
                                Obesity, Class III, BMI 40-49.9 (morbid obesity)  
                 2021  
  
documented as of this encounter (statuses as of 2022)  
[facility]- History of Past illness Narrative*   
  
                                Problem         Noted Date      Resolved Date  
   
                                Medicare annual wellness visit, subsequent 2019  
   
                                Encounter for long-term (current) use of medicat  
ions 2018  
   
                                                      
  
  
Overview:Formatting of this note might be different from the original.  
Added automatically from request for surgery [id number]  
   
   
                                History of colonic polyps 2018  
   
                                                      
  
  
Overview:Formatting of this note might be different from the original.  
Added automatically from request for surgery [id number]  
   
   
                                Gastroesophageal reflux disease 2018  
   
                                                      
  
  
Overview:Formatting of this note might be different from the original.  
Added automatically from request for surgery [id number]  
   
   
                                Acute pulmonary edema 10/05/2011      10/06/2011  
   
                                                      
  
  
Overview:Formatting of this note might be different from the original.  
10/5/2011  
cardiogenic and noncardiogenic factors  
  
   
   
                                Atelectasis     10/05/2011      10/06/2011  
   
                                Hypotension     10/04/2011      10/06/2011  
   
                                                      
  
  
Overview:Formatting of this note might be different from the original.  
10/4/2011  
goal map 65-80  
   
   
                                Stress hyperglycemia 10/04/2011      10/06/2011  
   
                                                      
  
  
Overview:Formatting of this note might be different from the original.  
10/4/2011  
Perioperative insulin resistance and exacerbation of hyperglycemia.  
Control blood glucose with titration of insulin infusion  
  
10/5/2011  
adequate control  
goal FBG<180  
  
   
   
                                Post-op pain    10/04/2011      10/06/2011  
   
                                Mechanically assisted ventilation 10/04/2011      
  10/05/2011  
   
                                                      
  
  
Overview:Formatting of this note might be different from the original.  
10/4/2011  
optimize MV settings, WTE  
current setting:FiO2, 75%, peep 8  
   
   
                                Cardiac insufficiency 10/04/2011      10/05/2011  
   
                                                      
  
  
Overview:Formatting of this note might be different from the original.  
10/4/2011  
RV mild to moderate post pump  
goal CI>2.3  
   
   
                                Hypoxemia       10/04/2011      10/06/2011  
   
                                discharge planning 2011      10/19/2011  
   
                                                      
  
  
Overview:Formatting of this note might be different from the original.  
age 71,  lives in [place], [place]. No DC needs.  
/  
   
   
                                Pre-op testing  2011      10/04/2011  
   
                                                      
  
  
Overview:Formatting of this note is different from the original.  
Images from the original note were not included.  
[facility]  
PRE-OP CHECKLIST  
  
Surgeon: Deangelo Angulo M.D. Informed Consent Completed: No  
STS Score: 1.4%  
CAD: Yes - CAD on Problem List: Yes  
Is intended procedure a CABG: Yes - is a beta blocker ordered? Yes  
  
H & P completed: Yes  
  
PA/LAT: Completed CT: Completed MRI: N/A LE US: N/A  
  
Cath: Yes - reviewed: Yes Echo:Completed EKG: Completed EF %: 61  
  
PI's: N/A Carotid: Completed Mapping: N/A Dental: Completed PFT's: N/A  
  
Basename 11 0729  
WBC 7.18  
HB 13.1  
HCT 41.8  
  
INR 1.0  
CREAT 1.35  
  
UA: Normal  
HCG:N/A  
  
ABO/ABO Confirmed: Yes  
  
Blood ordered: No  
  
SA Swab: Yes - results: Pending  
  
Last Dose of Anticoagulation: vitamins  
  
Op Note: N/A  
  
Pacemaker Check: N/A  
  
Consults: GI 2001  
  
DM: No  
  
Cardiac Surgical prep: No  
  
SIGNATURE: Krystal Castellanos RN CHECKED BY:  
DATE of SERVICE: 2011  
TIME of SERVICE: 2:23 PM  
  
  
   
   
                                Anemia of chronic disease 2011  
   
                                Intestinal polyp 2011  
   
                                                      
  
  
Overview:Formatting of this note might be different from the original.  
Distal ileum ulcerated polyp.  
   
   
                                Nonspecific abnormal results of liver function s  
pepe 2006  
   
                                Impotence of organic origin 2006      09/0  
2015  
   
                                Obesity, Class III, BMI 40-49.9 (morbid obesity)  
                 2021  
  
documented as of this encounter (statuses as of 2022)  
[facility]- History of Past illness Narrative*   
  
                                Problem         Noted Date      Resolved Date  
   
                                Medicare annual wellness visit, subsequent 2019  
   
                                Encounter for long-term (current) use of medicat  
ions 2018  
   
                                                      
  
  
Overview:Formatting of this note might be different from the original.  
Added automatically from request for surgery [id number]  
   
   
                                History of colonic polyps 2018  
   
                                                      
  
  
Overview:Formatting of this note might be different from the original.  
Added automatically from request for surgery [id number]  
   
   
                                Gastroesophageal reflux disease 2018  
   
                                                      
  
  
Overview:Formatting of this note might be different from the original.  
Added automatically from request for surgery [id number]  
   
   
                                Acute pulmonary edema 10/05/2011      10/06/2011  
   
                                                      
  
  
Overview:Formatting of this note might be different from the original.  
10/5/2011  
cardiogenic and noncardiogenic factors  
  
   
   
                                Atelectasis     10/05/2011      10/06/2011  
   
                                Hypotension     10/04/2011      10/06/2011  
   
                                                      
  
  
Overview:Formatting of this note might be different from the original.  
10/4/2011  
goal map 65-80  
   
   
                                Stress hyperglycemia 10/04/2011      10/06/2011  
   
                                                      
  
  
Overview:Formatting of this note might be different from the original.  
10/4/2011  
Perioperative insulin resistance and exacerbation of hyperglycemia.  
Control blood glucose with titration of insulin infusion  
  
10/5/2011  
adequate control  
goal FBG<180  
  
   
   
                                Post-op pain    10/04/2011      10/06/2011  
   
                                Mechanically assisted ventilation 10/04/2011      
  10/05/2011  
   
                                                      
  
  
Overview:Formatting of this note might be different from the original.  
10/4/2011  
optimize MV settings, WTE  
current setting:FiO2, 75%, peep 8  
   
   
                                Cardiac insufficiency 10/04/2011      10/05/2011  
   
                                                      
  
  
Overview:Formatting of this note might be different from the original.  
10/4/2011  
RV mild to moderate post pump  
goal CI>2.3  
   
   
                                Hypoxemia       10/04/2011      10/06/2011  
   
                                discharge planning 2011      10/19/2011  
   
                                                      
  
  
Overview:Formatting of this note might be different from the original.  
age 71,  lives in [place], [place]. No DC needs.  
/  
   
   
                                Pre-op testing  2011      10/04/2011  
   
                                                      
  
  
Overview:Formatting of this note is different from the original.  
Images from the original note were not included.  
[facility]  
PRE-OP CHECKLIST  
  
Surgeon: Deangelo Angulo M.D. Informed Consent Completed: No  
STS Score: 1.4%  
CAD: Yes - CAD on Problem List: Yes  
Is intended procedure a CABG: Yes - is a beta blocker ordered? Yes  
  
H & P completed: Yes  
  
PA/LAT: Completed CT: Completed MRI: N/A LE US: N/A  
  
Cath: Yes - reviewed: Yes Echo:Completed EKG: Completed EF %: 61  
  
PI's: N/A Carotid: Completed Mapping: N/A Dental: Completed PFT's: N/A  
  
Basename 11 0729  
WBC 7.18  
HB 13.1  
HCT 41.8  
  
INR 1.0  
CREAT 1.35  
  
UA: Normal  
HCG:N/A  
  
ABO/ABO Confirmed: Yes  
  
Blood ordered: No  
  
SA Swab: Yes - results: Pending  
  
Last Dose of Anticoagulation: vitamins  
  
Op Note: N/A  
  
Pacemaker Check: N/A  
  
Consults: GI 2001  
  
DM: No  
  
Cardiac Surgical prep: No  
  
SIGNATURE: Krystal Castellanos RN CHECKED BY:  
DATE of SERVICE: 2011  
TIME of SERVICE: 2:23 PM  
  
  
   
   
                                Anemia of chronic disease 2011  
   
                                Intestinal polyp 2011  
   
                                                      
  
  
Overview:Formatting of this note might be different from the original.  
Distal ileum ulcerated polyp.  
   
   
                                Nonspecific abnormal results of liver function s  
nialldy 2006  
   
                                Impotence of organic origin 2006  
   
                                Obesity, Class III, BMI 40-49.9 (morbid obesity)  
                 2021  
  
documented as of this encounter (statuses as of 2022)  
[facility]06- History of Past illness Narrative*   
  
                                Problem         Noted Date      Resolved Date  
   
                                Medicare annual wellness visit, subsequent 2019  
   
                                Encounter for long-term (current) use of medicat  
ions 2018  
   
                                                      
  
  
Overview:Formatting of this note might be different from the original.  
Added automatically from request for surgery [id number]  
   
   
                                History of colonic polyps 2018  
   
                                                      
  
  
Overview:Formatting of this note might be different from the original.  
Added automatically from request for surgery [id number]  
   
   
                                Gastroesophageal reflux disease 2018  
   
                                                      
  
  
Overview:Formatting of this note might be different from the original.  
Added automatically from request for surgery [id number]  
   
   
                                Acute pulmonary edema 10/05/2011      10/06/2011  
   
                                                      
  
  
Overview:Formatting of this note might be different from the original.  
10/5/2011  
cardiogenic and noncardiogenic factors  
  
   
   
                                Atelectasis     10/05/2011      10/06/2011  
   
                                Hypotension     10/04/2011      10/06/2011  
   
                                                      
  
  
Overview:Formatting of this note might be different from the original.  
10/4/2011  
goal map 65-80  
   
   
                                Stress hyperglycemia 10/04/2011      10/06/2011  
   
                                                      
  
  
Overview:Formatting of this note might be different from the original.  
10/4/2011  
Perioperative insulin resistance and exacerbation of hyperglycemia.  
Control blood glucose with titration of insulin infusion  
  
10/5/2011  
adequate control  
goal FBG<180  
  
   
   
                                Post-op pain    10/04/2011      10/06/2011  
   
                                Mechanically assisted ventilation 10/04/2011      
  10/05/2011  
   
                                                      
  
  
Overview:Formatting of this note might be different from the original.  
10/4/2011  
optimize MV settings, WTE  
current setting:FiO2, 75%, peep 8  
   
   
                                Cardiac insufficiency 10/04/2011      10/05/2011  
   
                                                      
  
  
Overview:Formatting of this note might be different from the original.  
10/4/2011  
RV mild to moderate post pump  
goal CI>2.3  
   
   
                                Hypoxemia       10/04/2011      10/06/2011  
   
                                discharge planning 2011      10/19/2011  
   
                                                      
  
  
Overview:Formatting of this note might be different from the original.  
age 71,  lives in [place], [place]. No DC needs.  
/  
   
   
                                Pre-op testing  2011      10/04/2011  
   
                                                      
  
  
Overview:Formatting of this note is different from the original.  
Images from the original note were not included.  
[facility]  
PRE-OP CHECKLIST  
  
Surgeon: Deangelo Angulo M.D. Informed Consent Completed: No  
STS Score: 1.4%  
CAD: Yes - CAD on Problem List: Yes  
Is intended procedure a CABG: Yes - is a beta blocker ordered? Yes  
  
H & P completed: Yes  
  
PA/LAT: Completed CT: Completed MRI: N/A LE US: N/A  
  
Cath: Yes - reviewed: Yes Echo:Completed EKG: Completed EF %: 61  
  
PI's: N/A Carotid: Completed Mapping: N/A Dental: Completed PFT's: N/A  
  
Basename 11 0729  
WBC 7.18  
HB 13.1  
HCT 41.8  
  
INR 1.0  
CREAT 1.35  
  
UA: Normal  
HCG:N/A  
  
ABO/ABO Confirmed: Yes  
  
Blood ordered: No  
  
SA Swab: Yes - results: Pending  
  
Last Dose of Anticoagulation: vitamins  
  
Op Note: N/A  
  
Pacemaker Check: N/A  
  
Consults: GI 2001  
  
DM: No  
  
Cardiac Surgical prep: No  
  
SIGNATURE: Krystal Castellanos RN CHECKED BY:  
DATE of SERVICE: 2011  
TIME of SERVICE: 2:23 PM  
  
  
   
   
                                Anemia of chronic disease 2011  
   
                                Intestinal polyp 2011  
   
                                                      
  
  
Overview:Formatting of this note might be different from the original.  
Distal ileum ulcerated polyp.  
   
   
                                Nonspecific abnormal results of liver function s  
tudy 2006  
   
                                Impotence of organic origin 2006  
   
                                Obesity, Class III, BMI 40-49.9 (morbid obesity)  
                 2021  
  
documented as of this encounter (statuses as of 10/02/2022)  
[facility]- History of Past illness Narrative*   
  
                                Problem         Noted Date      Resolved Date  
   
                                Medicare annual wellness visit, subsequent 2019  
   
                                Encounter for long-term (current) use of medicat  
ions 2018  
   
                                                      
  
  
Overview:Formatting of this note might be different from the original.  
Added automatically from request for surgery [id number]  
   
   
                                History of colonic polyps 2018  
   
                                                      
  
  
Overview:Formatting of this note might be different from the original.  
Added automatically from request for surgery [id number]  
   
   
                                Gastroesophageal reflux disease 2018  
   
                                                      
  
  
Overview:Formatting of this note might be different from the original.  
Added automatically from request for surgery [id number]  
   
   
                                Acute pulmonary edema 10/05/2011      10/06/2011  
   
                                                      
  
  
Overview:Formatting of this note might be different from the original.  
10/5/2011  
cardiogenic and noncardiogenic factors  
  
   
   
                                Atelectasis     10/05/2011      10/06/2011  
   
                                Hypotension     10/04/2011      10/06/2011  
   
                                                      
  
  
Overview:Formatting of this note might be different from the original.  
10/4/2011  
goal map 65-80  
   
   
                                Stress hyperglycemia 10/04/2011      10/06/2011  
   
                                                      
  
  
Overview:Formatting of this note might be different from the original.  
10/4/2011  
Perioperative insulin resistance and exacerbation of hyperglycemia.  
Control blood glucose with titration of insulin infusion  
  
10/5/2011  
adequate control  
goal FBG<180  
  
   
   
                                Post-op pain    10/04/2011      10/06/2011  
   
                                Mechanically assisted ventilation 10/04/2011      
  10/05/2011  
   
                                                      
  
  
Overview:Formatting of this note might be different from the original.  
10/4/2011  
optimize MV settings, WTE  
current setting:FiO2, 75%, peep 8  
   
   
                                Cardiac insufficiency 10/04/2011      10/05/2011  
   
                                                      
  
  
Overview:Formatting of this note might be different from the original.  
10/4/2011  
RV mild to moderate post pump  
goal CI>2.3  
   
   
                                Hypoxemia       10/04/2011      10/06/2011  
   
                                discharge planning 2011      10/19/2011  
   
                                                      
  
  
Overview:Formatting of this note might be different from the original.  
age 71,  lives in [place], [place]. No DC needs.  
/  
   
   
                                Pre-op testing  2011      10/04/2011  
   
                                                      
  
  
Overview:Formatting of this note is different from the original.  
Images from the original note were not included.  
[facility]  
PRE-OP CHECKLIST  
  
Surgeon: Deangelo Angulo M.D. Informed Consent Completed: No  
STS Score: 1.4%  
CAD: Yes - CAD on Problem List: Yes  
Is intended procedure a CABG: Yes - is a beta blocker ordered? Yes  
  
H & P completed: Yes  
  
PA/LAT: Completed CT: Completed MRI: N/A LE US: N/A  
  
Cath: Yes - reviewed: Yes Echo:Completed EKG: Completed EF %: 61  
  
PI's: N/A Carotid: Completed Mapping: N/A Dental: Completed PFT's: N/A  
  
Basename 11 0729  
WBC 7.18  
HB 13.1  
HCT 41.8  
  
INR 1.0  
CREAT 1.35  
  
UA: Normal  
HCG:N/A  
  
ABO/ABO Confirmed: Yes  
  
Blood ordered: No  
  
SA Swab: Yes - results: Pending  
  
Last Dose of Anticoagulation: vitamins  
  
Op Note: N/A  
  
Pacemaker Check: N/A  
  
Consults: GI 2001  
  
DM: No  
  
Cardiac Surgical prep: No  
  
SIGNATURE: Krystal Castellanos RN CHECKED BY:  
DATE of SERVICE: 2011  
TIME of SERVICE: 2:23 PM  
  
  
   
   
                                Anemia of chronic disease 2011  
   
                                Intestinal polyp 2011  
   
                                                      
  
  
Overview:Formatting of this note might be different from the original.  
Distal ileum ulcerated polyp.  
   
   
                                Nonspecific abnormal results of liver function s  
tudy 2006  
   
                                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  
   
                                Obesity, Class III, BMI 40-49.9 (morbid obesity)  
                 2021  
  
documented as of this encounter (statuses as of 10/03/2022)  
[facility]- History of Past illness Narrative*   
  
                                Problem         Noted Date      Resolved Date  
   
                                Medicare annual wellness visit, subsequent 2019  
   
                                Encounter for long-term (current) use of medicat  
ions 2018  
   
                                                      
  
  
Overview:Formatting of this note might be different from the original.  
Added automatically from request for surgery [id number]  
   
   
                                History of colonic polyps 2018  
   
                                                      
  
  
Overview:Formatting of this note might be different from the original.  
Added automatically from request for surgery [id number]  
   
   
                                Gastroesophageal reflux disease 2018  
   
                                                      
  
  
Overview:Formatting of this note might be different from the original.  
Added automatically from request for surgery [id number]  
   
   
                                Acute pulmonary edema 10/05/2011      10/06/2011  
   
                                                      
  
  
Overview:Formatting of this note might be different from the original.  
10/5/2011  
cardiogenic and noncardiogenic factors  
  
   
   
                                Atelectasis     10/05/2011      10/06/2011  
   
                                Hypotension     10/04/2011      10/06/2011  
   
                                                      
  
  
Overview:Formatting of this note might be different from the original.  
10/4/2011  
goal map 65-80  
   
   
                                Stress hyperglycemia 10/04/2011      10/06/2011  
   
                                                      
  
  
Overview:Formatting of this note might be different from the original.  
10/4/2011  
Perioperative insulin resistance and exacerbation of hyperglycemia.  
Control blood glucose with titration of insulin infusion  
  
10/5/2011  
adequate control  
goal FBG<180  
  
   
   
                                Post-op pain    10/04/2011      10/06/2011  
   
                                Mechanically assisted ventilation 10/04/2011      
  10/05/2011  
   
                                                      
  
  
Overview:Formatting of this note might be different from the original.  
10/4/2011  
optimize MV settings, WTE  
current setting:FiO2, 75%, peep 8  
   
   
                                Cardiac insufficiency 10/04/2011      10/05/2011  
   
                                                      
  
  
Overview:Formatting of this note might be different from the original.  
10/4/2011  
RV mild to moderate post pump  
goal CI>2.3  
   
   
                                Hypoxemia       10/04/2011      10/06/2011  
   
                                discharge planning 2011      10/19/2011  
   
                                                      
  
  
Overview:Formatting of this note might be different from the original.  
age 71,  lives in [place], [place]. No DC needs.  
/  
   
   
                                Pre-op testing  2011      10/04/2011  
   
                                                      
  
  
Overview:Formatting of this note is different from the original.  
Images from the original note were not included.  
[facility]  
PRE-OP CHECKLIST  
  
Surgeon: Deangelo Angulo M.D. Informed Consent Completed: No  
STS Score: 1.4%  
CAD: Yes - CAD on Problem List: Yes  
Is intended procedure a CABG: Yes - is a beta blocker ordered? Yes  
  
H & P completed: Yes  
  
PA/LAT: Completed CT: Completed MRI: N/A LE US: N/A  
  
Cath: Yes - reviewed: Yes Echo:Completed EKG: Completed EF %: 61  
  
PI's: N/A Carotid: Completed Mapping: N/A Dental: Completed PFT's: N/A  
  
Basename 11 0729  
WBC 7.18  
HB 13.1  
HCT 41.8  
  
INR 1.0  
CREAT 1.35  
  
UA: Normal  
HCG:N/A  
  
ABO/ABO Confirmed: Yes  
  
Blood ordered: No  
  
SA Swab: Yes - results: Pending  
  
Last Dose of Anticoagulation: vitamins  
  
Op Note: N/A  
  
Pacemaker Check: N/A  
  
Consults: GI 2001  
  
DM: No  
  
Cardiac Surgical prep: No  
  
SIGNATURE: Krystal Castellanos RN CHECKED BY:  
DATE of SERVICE: 2011  
TIME of SERVICE: 2:23 PM  
  
  
   
   
                                Anemia of chronic disease 2011  
   
                                Intestinal polyp 2011  
   
                                                      
  
  
Overview:Formatting of this note might be different from the original.  
Distal ileum ulcerated polyp.  
   
   
                                Nonspecific abnormal results of liver function s  
pepe 2006  
   
                                Impotence of organic origin 2006      09/0  
2015  
   
                                Obesity, Class III, BMI 40-49.9 (morbid obesity)  
                 2021  
  
documented as of this encounter (statuses as of 10/04/2022)  
[facility]- History of Past illness Narrative*   
  
                                Problem         Noted Date      Resolved Date  
   
                                Medicare annual wellness visit, subsequent 2019  
   
                                Encounter for long-term (current) use of medicat  
ions 2018  
   
                                                      
  
  
Overview:Formatting of this note might be different from the original.  
Added automatically from request for surgery [id number]  
   
   
                                History of colonic polyps 2018  
   
                                                      
  
  
Overview:Formatting of this note might be different from the original.  
Added automatically from request for surgery [id number]  
   
   
                                Gastroesophageal reflux disease 2018  
   
                                                      
  
  
Overview:Formatting of this note might be different from the original.  
Added automatically from request for surgery [id number]  
   
   
                                Acute pulmonary edema 10/05/2011      10/06/2011  
   
                                                      
  
  
Overview:Formatting of this note might be different from the original.  
10/5/2011  
cardiogenic and noncardiogenic factors  
  
   
   
                                Atelectasis     10/05/2011      10/06/2011  
   
                                Hypotension     10/04/2011      10/06/2011  
   
                                                      
  
  
Overview:Formatting of this note might be different from the original.  
10/4/2011  
goal map 65-80  
   
   
                                Stress hyperglycemia 10/04/2011      10/06/2011  
   
                                                      
  
  
Overview:Formatting of this note might be different from the original.  
10/4/2011  
Perioperative insulin resistance and exacerbation of hyperglycemia.  
Control blood glucose with titration of insulin infusion  
  
10/5/2011  
adequate control  
goal FBG<180  
  
   
   
                                Post-op pain    10/04/2011      10/06/2011  
   
                                Mechanically assisted ventilation 10/04/2011      
  10/05/2011  
   
                                                      
  
  
Overview:Formatting of this note might be different from the original.  
10/4/2011  
optimize MV settings, WTE  
current setting:FiO2, 75%, peep 8  
   
   
                                Cardiac insufficiency 10/04/2011      10/05/2011  
   
                                                      
  
  
Overview:Formatting of this note might be different from the original.  
10/4/2011  
RV mild to moderate post pump  
goal CI>2.3  
   
   
                                Hypoxemia       10/04/2011      10/06/2011  
   
                                discharge planning 2011      10/19/2011  
   
                                                      
  
  
Overview:Formatting of this note might be different from the original.  
age 71,  lives in [place], [place]. No DC needs.  
/  
   
   
                                Pre-op testing  2011      10/04/2011  
   
                                                      
  
  
Overview:Formatting of this note is different from the original.  
Images from the original note were not included.  
[facility]  
PRE-OP CHECKLIST  
  
Surgeon: Deangelo Angulo M.D. Informed Consent Completed: No  
STS Score: 1.4%  
CAD: Yes - CAD on Problem List: Yes  
Is intended procedure a CABG: Yes - is a beta blocker ordered? Yes  
  
H & P completed: Yes  
  
PA/LAT: Completed CT: Completed MRI: N/A LE US: N/A  
  
Cath: Yes - reviewed: Yes Echo:Completed EKG: Completed EF %: 61  
  
PI's: N/A Carotid: Completed Mapping: N/A Dental: Completed PFT's: N/A  
  
Basename 11 0729  
WBC 7.18  
HB 13.1  
HCT 41.8  
  
INR 1.0  
CREAT 1.35  
  
UA: Normal  
HCG:N/A  
  
ABO/ABO Confirmed: Yes  
  
Blood ordered: No  
  
SA Swab: Yes - results: Pending  
  
Last Dose of Anticoagulation: vitamins  
  
Op Note: N/A  
  
Pacemaker Check: N/A  
  
Consults: GI 2001  
  
DM: No  
  
Cardiac Surgical prep: No  
  
SIGNATURE: Krystal Castellanos RN CHECKED BY:  
DATE of SERVICE: 2011  
TIME of SERVICE: 2:23 PM  
  
  
   
   
                                Anemia of chronic disease 2011  
   
                                Intestinal polyp 2011  
   
                                                      
  
  
Overview:Formatting of this note might be different from the original.  
Distal ileum ulcerated polyp.  
   
   
                                Nonspecific abnormal results of liver function s  
tudy 2006  
   
                                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  
2015  
   
                                Obesity, Class III, BMI 40-49.9 (morbid obesity)  
                 2021  
  
documented as of this encounter (statuses as of 10/05/2022)  
[facility]- History of Past illness Narrative*   
  
                                Problem         Noted Date      Resolved Date  
   
                                Medicare annual wellness visit, subsequent 2019  
   
                                Encounter for long-term (current) use of medicat  
ions 2018  
   
                                                      
  
  
Overview:Formatting of this note might be different from the original.  
Added automatically from request for surgery [id number]  
   
   
                                History of colonic polyps 2018  
   
                                                      
  
  
Overview:Formatting of this note might be different from the original.  
Added automatically from request for surgery [id number]  
   
   
                                Gastroesophageal reflux disease 2018  
   
                                                      
  
  
Overview:Formatting of this note might be different from the original.  
Added automatically from request for surgery [id number]  
   
   
                                Acute pulmonary edema 10/05/2011      10/06/2011  
   
                                                      
  
  
Overview:Formatting of this note might be different from the original.  
10/5/2011  
cardiogenic and noncardiogenic factors  
  
   
   
                                Atelectasis     10/05/2011      10/06/2011  
   
                                Hypotension     10/04/2011      10/06/2011  
   
                                                      
  
  
Overview:Formatting of this note might be different from the original.  
10/4/2011  
goal map 65-80  
   
   
                                Stress hyperglycemia 10/04/2011      10/06/2011  
   
                                                      
  
  
Overview:Formatting of this note might be different from the original.  
10/4/2011  
Perioperative insulin resistance and exacerbation of hyperglycemia.  
Control blood glucose with titration of insulin infusion  
  
10/5/2011  
adequate control  
goal FBG<180  
  
   
   
                                Post-op pain    10/04/2011      10/06/2011  
   
                                Mechanically assisted ventilation 10/04/2011      
  10/05/2011  
   
                                                      
  
  
Overview:Formatting of this note might be different from the original.  
10/4/2011  
optimize MV settings, WTE  
current setting:FiO2, 75%, peep 8  
   
   
                                Cardiac insufficiency 10/04/2011      10/05/2011  
   
                                                      
  
  
Overview:Formatting of this note might be different from the original.  
10/4/2011  
RV mild to moderate post pump  
goal CI>2.3  
   
   
                                Hypoxemia       10/04/2011      10/06/2011  
   
                                discharge planning 2011      10/19/2011  
   
                                                      
  
  
Overview:Formatting of this note might be different from the original.  
age 71,  lives in [place], [place]. No DC needs.  
/  
   
   
                                Pre-op testing  2011      10/04/2011  
   
                                                      
  
  
Overview:Formatting of this note is different from the original.  
Images from the original note were not included.  
[facility]  
PRE-OP CHECKLIST  
  
Surgeon: Deangelo Angulo M.D. Informed Consent Completed: No  
STS Score: 1.4%  
CAD: Yes - CAD on Problem List: Yes  
Is intended procedure a CABG: Yes - is a beta blocker ordered? Yes  
  
H & P completed: Yes  
  
PA/LAT: Completed CT: Completed MRI: N/A LE US: N/A  
  
Cath: Yes - reviewed: Yes Echo:Completed EKG: Completed EF %: 61  
  
PI's: N/A Carotid: Completed Mapping: N/A Dental: Completed PFT's: N/A  
  
Basename 11 0729  
WBC 7.18  
HB 13.1  
HCT 41.8  
  
INR 1.0  
CREAT 1.35  
  
UA: Normal  
HCG:N/A  
  
ABO/ABO Confirmed: Yes  
  
Blood ordered: No  
  
SA Swab: Yes - results: Pending  
  
Last Dose of Anticoagulation: vitamins  
  
Op Note: N/A  
  
Pacemaker Check: N/A  
  
Consults: GI 2001  
  
DM: No  
  
Cardiac Surgical prep: No  
  
SIGNATURE: Krystal Castellanos RN CHECKED BY:  
DATE of SERVICE: 2011  
TIME of SERVICE: 2:23 PM  
  
  
   
   
                                Anemia of chronic disease 2011  
   
                                Intestinal polyp 2011  
   
                                                      
  
  
Overview:Formatting of this note might be different from the original.  
Distal ileum ulcerated polyp.  
   
   
                                Nonspecific abnormal results of liver function s  
tudy 2006  
   
                                Impotence of organic origin 2006  
   
                                Obesity, Class III, BMI 40-49.9 (morbid obesity)  
                 2021  
  
documented as of this encounter (statuses as of 10/07/2022)  
[facility]06- History of Past illness Narrative*   
  
                                Problem         Noted Date      Resolved Date  
   
                                Medicare annual wellness visit, subsequent 2019  
   
                                Encounter for long-term (current) use of medicat  
ions 2018  
   
                                                      
  
  
Overview:Formatting of this note might be different from the original.  
Added automatically from request for surgery [id number]  
   
   
                                History of colonic polyps 2018  
   
                                                      
  
  
Overview:Formatting of this note might be different from the original.  
Added automatically from request for surgery [id number]  
   
   
                                Gastroesophageal reflux disease 2018  
   
                                                      
  
  
Overview:Formatting of this note might be different from the original.  
Added automatically from request for surgery [id number]  
   
   
                                Acute pulmonary edema 10/05/2011      10/06/2011  
   
                                                      
  
  
Overview:Formatting of this note might be different from the original.  
10/5/2011  
cardiogenic and noncardiogenic factors  
  
   
   
                                Atelectasis     10/05/2011      10/06/2011  
   
                                Hypotension     10/04/2011      10/06/2011  
   
                                                      
  
  
Overview:Formatting of this note might be different from the original.  
10/4/2011  
goal map 65-80  
   
   
                                Stress hyperglycemia 10/04/2011      10/06/2011  
   
                                                      
  
  
Overview:Formatting of this note might be different from the original.  
10/4/2011  
Perioperative insulin resistance and exacerbation of hyperglycemia.  
Control blood glucose with titration of insulin infusion  
  
10/5/2011  
adequate control  
goal FBG<180  
  
   
   
                                Post-op pain    10/04/2011      10/06/2011  
   
                                Mechanically assisted ventilation 10/04/2011      
  10/05/2011  
   
                                                      
  
  
Overview:Formatting of this note might be different from the original.  
10/4/2011  
optimize MV settings, WTE  
current setting:FiO2, 75%, peep 8  
   
   
                                Cardiac insufficiency 10/04/2011      10/05/2011  
   
                                                      
  
  
Overview:Formatting of this note might be different from the original.  
10/4/2011  
RV mild to moderate post pump  
goal CI>2.3  
   
   
                                Hypoxemia       10/04/2011      10/06/2011  
   
                                discharge planning 2011      10/19/2011  
   
                                                      
  
  
Overview:Formatting of this note might be different from the original.  
age 71,  lives in [place], [place]. No DC needs.  
/  
   
   
                                Pre-op testing  2011      10/04/2011  
   
                                                      
  
  
Overview:Formatting of this note is different from the original.  
Images from the original note were not included.  
[facility]  
PRE-OP CHECKLIST  
  
Surgeon: Deangelo Angulo M.D. Informed Consent Completed: No  
STS Score: 1.4%  
CAD: Yes - CAD on Problem List: Yes  
Is intended procedure a CABG: Yes - is a beta blocker ordered? Yes  
  
H & P completed: Yes  
  
PA/LAT: Completed CT: Completed MRI: N/A LE US: N/A  
  
Cath: Yes - reviewed: Yes Echo:Completed EKG: Completed EF %: 61  
  
PI's: N/A Carotid: Completed Mapping: N/A Dental: Completed PFT's: N/A  
  
Basename 11 0729  
WBC 7.18  
HB 13.1  
HCT 41.8  
  
INR 1.0  
CREAT 1.35  
  
UA: Normal  
HCG:N/A  
  
ABO/ABO Confirmed: Yes  
  
Blood ordered: No  
  
SA Swab: Yes - results: Pending  
  
Last Dose of Anticoagulation: vitamins  
  
Op Note: N/A  
  
Pacemaker Check: N/A  
  
Consults: GI 2001  
  
DM: No  
  
Cardiac Surgical prep: No  
  
SIGNATURE: Krystal Castellanos RN CHECKED BY:  
DATE of SERVICE: 2011  
TIME of SERVICE: 2:23 PM  
  
  
   
   
                                Anemia of chronic disease 2011  
   
                                Intestinal polyp 2011  
   
                                                      
  
  
Overview:Formatting of this note might be different from the original.  
Distal ileum ulcerated polyp.  
   
   
                                Nonspecific abnormal results of liver function s  
tudy 2006  
   
                                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  
2015  
   
                                Obesity, Class III, BMI 40-49.9 (morbid obesity)  
                 2021  
  
documented as of this encounter (statuses as of 10/10/2022)  
[facility]- History of Past illness Narrative*   
  
                                Problem         Noted Date      Resolved Date  
   
                                Medicare annual wellness visit, subsequent 2019  
   
                                Encounter for long-term (current) use of medicat  
ions 2018  
   
                                                      
  
  
Overview:Formatting of this note might be different from the original.  
Added automatically from request for surgery [id number]  
   
   
                                History of colonic polyps 2018  
   
                                                      
  
  
Overview:Formatting of this note might be different from the original.  
Added automatically from request for surgery [id number]  
   
   
                                Gastroesophageal reflux disease 2018  
   
                                                      
  
  
Overview:Formatting of this note might be different from the original.  
Added automatically from request for surgery [id number]  
   
   
                                Acute pulmonary edema 10/05/2011      10/06/2011  
   
                                                      
  
  
Overview:Formatting of this note might be different from the original.  
10/5/2011  
cardiogenic and noncardiogenic factors  
  
   
   
                                Atelectasis     10/05/2011      10/06/2011  
   
                                Hypotension     10/04/2011      10/06/2011  
   
                                                      
  
  
Overview:Formatting of this note might be different from the original.  
10/4/2011  
goal map 65-80  
   
   
                                Stress hyperglycemia 10/04/2011      10/06/2011  
   
                                                      
  
  
Overview:Formatting of this note might be different from the original.  
10/4/2011  
Perioperative insulin resistance and exacerbation of hyperglycemia.  
Control blood glucose with titration of insulin infusion  
  
10/5/2011  
adequate control  
goal FBG<180  
  
   
   
                                Post-op pain    10/04/2011      10/06/2011  
   
                                Mechanically assisted ventilation 10/04/2011      
  10/05/2011  
   
                                                      
  
  
Overview:Formatting of this note might be different from the original.  
10/4/2011  
optimize MV settings, WTE  
current setting:FiO2, 75%, peep 8  
   
   
                                Cardiac insufficiency 10/04/2011      10/05/2011  
   
                                                      
  
  
Overview:Formatting of this note might be different from the original.  
10/4/2011  
RV mild to moderate post pump  
goal CI>2.3  
   
   
                                Hypoxemia       10/04/2011      10/06/2011  
   
                                discharge planning 2011      10/19/2011  
   
                                                      
  
  
Overview:Formatting of this note might be different from the original.  
age 71,  lives in [place], [place]. No DC needs.  
/  
   
   
                                Pre-op testing  2011      10/04/2011  
   
                                                      
  
  
Overview:Formatting of this note is different from the original.  
Images from the original note were not included.  
[facility]  
PRE-OP CHECKLIST  
  
Surgeon: Deangelo Angulo M.D. Informed Consent Completed: No  
STS Score: 1.4%  
CAD: Yes - CAD on Problem List: Yes  
Is intended procedure a CABG: Yes - is a beta blocker ordered? Yes  
  
H & P completed: Yes  
  
PA/LAT: Completed CT: Completed MRI: N/A LE US: N/A  
  
Cath: Yes - reviewed: Yes Echo:Completed EKG: Completed EF %: 61  
  
PI's: N/A Carotid: Completed Mapping: N/A Dental: Completed PFT's: N/A  
  
Basename 11 0729  
WBC 7.18  
HB 13.1  
HCT 41.8  
  
INR 1.0  
CREAT 1.35  
  
UA: Normal  
HCG:N/A  
  
ABO/ABO Confirmed: Yes  
  
Blood ordered: No  
  
SA Swab: Yes - results: Pending  
  
Last Dose of Anticoagulation: vitamins  
  
Op Note: N/A  
  
Pacemaker Check: N/A  
  
Consults: GI 2001  
  
DM: No  
  
Cardiac Surgical prep: No  
  
SIGNATURE: Krystal Castellanos RN CHECKED BY:  
DATE of SERVICE: 2011  
TIME of SERVICE: 2:23 PM  
  
  
   
   
                                Anemia of chronic disease 2011  
   
                                Intestinal polyp 2011  
   
                                                      
  
  
Overview:Formatting of this note might be different from the original.  
Distal ileum ulcerated polyp.  
   
   
                                Nonspecific abnormal results of liver function s  
tudy 2006  
   
                                Impotence of organic origin 2006  
   
                                Obesity, Class III, BMI 40-49.9 (morbid obesity)  
                 2021  
  
documented as of this encounter (statuses as of 10/10/2022)  
[facility]- History of Past illness Narrative*   
  
                                Problem         Noted Date      Resolved Date  
   
                                Medicare annual wellness visit, subsequent 2019  
   
                                Encounter for long-term (current) use of medicat  
ions 2018  
   
                                                      
  
  
Overview:Formatting of this note might be different from the original.  
Added automatically from request for surgery [id number]  
   
   
                                History of colonic polyps 2018  
   
                                                      
  
  
Overview:Formatting of this note might be different from the original.  
Added automatically from request for surgery [id number]  
   
   
                                Gastroesophageal reflux disease 2018  
   
                                                      
  
  
Overview:Formatting of this note might be different from the original.  
Added automatically from request for surgery [id number]  
   
   
                                Acute pulmonary edema 10/05/2011      10/06/2011  
   
                                                      
  
  
Overview:Formatting of this note might be different from the original.  
10/5/2011  
cardiogenic and noncardiogenic factors  
  
   
   
                                Atelectasis     10/05/2011      10/06/2011  
   
                                Hypotension     10/04/2011      10/06/2011  
   
                                                      
  
  
Overview:Formatting of this note might be different from the original.  
10/4/2011  
goal map 65-80  
   
   
                                Stress hyperglycemia 10/04/2011      10/06/2011  
   
                                                      
  
  
Overview:Formatting of this note might be different from the original.  
10/4/2011  
Perioperative insulin resistance and exacerbation of hyperglycemia.  
Control blood glucose with titration of insulin infusion  
  
10/5/2011  
adequate control  
goal FBG<180  
  
   
   
                                Post-op pain    10/04/2011      10/06/2011  
   
                                Mechanically assisted ventilation 10/04/2011      
  10/05/2011  
   
                                                      
  
  
Overview:Formatting of this note might be different from the original.  
10/4/2011  
optimize MV settings, WTE  
current setting:FiO2, 75%, peep 8  
   
   
                                Cardiac insufficiency 10/04/2011      10/05/2011  
   
                                                      
  
  
Overview:Formatting of this note might be different from the original.  
10/4/2011  
RV mild to moderate post pump  
goal CI>2.3  
   
   
                                Hypoxemia       10/04/2011      10/06/2011  
   
                                discharge planning 2011      10/19/2011  
   
                                                      
  
  
Overview:Formatting of this note might be different from the original.  
age 71,  lives in [place], [place]. No DC needs.  
/  
   
   
                                Pre-op testing  2011      10/04/2011  
   
                                                      
  
  
Overview:Formatting of this note is different from the original.  
Images from the original note were not included.  
[facility]  
PRE-OP CHECKLIST  
  
Surgeon: Deangelo Angulo M.D. Informed Consent Completed: No  
STS Score: 1.4%  
CAD: Yes - CAD on Problem List: Yes  
Is intended procedure a CABG: Yes - is a beta blocker ordered? Yes  
  
H & P completed: Yes  
  
PA/LAT: Completed CT: Completed MRI: N/A LE US: N/A  
  
Cath: Yes - reviewed: Yes Echo:Completed EKG: Completed EF %: 61  
  
PI's: N/A Carotid: Completed Mapping: N/A Dental: Completed PFT's: N/A  
  
Basename 11 0729  
WBC 7.18  
HB 13.1  
HCT 41.8  
  
INR 1.0  
CREAT 1.35  
  
UA: Normal  
HCG:N/A  
  
ABO/ABO Confirmed: Yes  
  
Blood ordered: No  
  
SA Swab: Yes - results: Pending  
  
Last Dose of Anticoagulation: vitamins  
  
Op Note: N/A  
  
Pacemaker Check: N/A  
  
Consults: GI 2001  
  
DM: No  
  
Cardiac Surgical prep: No  
  
SIGNATURE: Krystal Castellanos RN CHECKED BY:  
DATE of SERVICE: 2011  
TIME of SERVICE: 2:23 PM  
  
  
   
   
                                Anemia of chronic disease 2011  
   
                                Intestinal polyp 2011  
   
                                                      
  
  
Overview:Formatting of this note might be different from the original.  
Distal ileum ulcerated polyp.  
   
   
                                Nonspecific abnormal results of liver function s  
tudy 2006  
   
                                Impotence of organic origin 2006  
   
                                Obesity, Class III, BMI 40-49.9 (morbid obesity)  
                 2021  
  
documented as of this encounter (statuses as of 10/12/2022)  
[facility]- History of Past illness Narrative*   
  
                                Problem         Noted Date      Resolved Date  
   
                                Medicare annual wellness visit, subsequent 2019  
   
                                Encounter for long-term (current) use of medicat  
ions 2018  
   
                                                      
  
  
Overview:Formatting of this note might be different from the original.  
Added automatically from request for surgery [id number]  
   
   
                                History of colonic polyps 2018  
   
                                                      
  
  
Overview:Formatting of this note might be different from the original.  
Added automatically from request for surgery [id number]  
   
   
                                Gastroesophageal reflux disease 2018  
   
                                                      
  
  
Overview:Formatting of this note might be different from the original.  
Added automatically from request for surgery [id number]  
   
   
                                Acute pulmonary edema 10/05/2011      10/06/2011  
   
                                                      
  
  
Overview:Formatting of this note might be different from the original.  
10/5/2011  
cardiogenic and noncardiogenic factors  
  
   
   
                                Atelectasis     10/05/2011      10/06/2011  
   
                                Hypotension     10/04/2011      10/06/2011  
   
                                                      
  
  
Overview:Formatting of this note might be different from the original.  
10/4/2011  
goal map 65-80  
   
   
                                Stress hyperglycemia 10/04/2011      10/06/2011  
   
                                                      
  
  
Overview:Formatting of this note might be different from the original.  
10/4/2011  
Perioperative insulin resistance and exacerbation of hyperglycemia.  
Control blood glucose with titration of insulin infusion  
  
10/5/2011  
adequate control  
goal FBG<180  
  
   
   
                                Post-op pain    10/04/2011      10/06/2011  
   
                                Mechanically assisted ventilation 10/04/2011      
  10/05/2011  
   
                                                      
  
  
Overview:Formatting of this note might be different from the original.  
10/4/2011  
optimize MV settings, WTE  
current setting:FiO2, 75%, peep 8  
   
   
                                Cardiac insufficiency 10/04/2011      10/05/2011  
   
                                                      
  
  
Overview:Formatting of this note might be different from the original.  
10/4/2011  
RV mild to moderate post pump  
goal CI>2.3  
   
   
                                Hypoxemia       10/04/2011      10/06/2011  
   
                                discharge planning 2011      10/19/2011  
   
                                                      
  
  
Overview:Formatting of this note might be different from the original.  
age 71,  lives in [place], [place]. No DC needs.  
/  
   
   
                                Pre-op testing  2011      10/04/2011  
   
                                                      
  
  
Overview:Formatting of this note is different from the original.  
Images from the original note were not included.  
[facility]  
PRE-OP CHECKLIST  
  
Surgeon: Deangelo Angulo M.D. Informed Consent Completed: No  
STS Score: 1.4%  
CAD: Yes - CAD on Problem List: Yes  
Is intended procedure a CABG: Yes - is a beta blocker ordered? Yes  
  
H & P completed: Yes  
  
PA/LAT: Completed CT: Completed MRI: N/A LE US: N/A  
  
Cath: Yes - reviewed: Yes Echo:Completed EKG: Completed EF %: 61  
  
PI's: N/A Carotid: Completed Mapping: N/A Dental: Completed PFT's: N/A  
  
Basename 11 0729  
WBC 7.18  
HB 13.1  
HCT 41.8  
  
INR 1.0  
CREAT 1.35  
  
UA: Normal  
HCG:N/A  
  
ABO/ABO Confirmed: Yes  
  
Blood ordered: No  
  
SA Swab: Yes - results: Pending  
  
Last Dose of Anticoagulation: vitamins  
  
Op Note: N/A  
  
Pacemaker Check: N/A  
  
Consults: GI 2001  
  
DM: No  
  
Cardiac Surgical prep: No  
  
SIGNATURE: Krystal Castellanos RN CHECKED BY:  
DATE of SERVICE: 2011  
TIME of SERVICE: 2:23 PM  
  
  
   
   
                                Anemia of chronic disease 2011  
   
                                Intestinal polyp 2011  
   
                                                      
  
  
Overview:Formatting of this note might be different from the original.  
Distal ileum ulcerated polyp.  
   
   
                                Nonspecific abnormal results of liver function s  
tudy 2006  
   
                                Impotence of organic origin 2006  
   
                                Obesity, Class III, BMI 40-49.9 (morbid obesity)  
                 2021  
  
documented as of this encounter (statuses as of 10/17/2022)  
[facility]- History of Past illness Narrative*   
  
                                Problem         Noted Date      Resolved Date  
   
                                Medicare annual wellness visit, subsequent 2019  
   
                                Encounter for long-term (current) use of medicat  
ions 2018  
   
                                                      
  
  
Overview:Formatting of this note might be different from the original.  
Added automatically from request for surgery [id number]  
   
   
                                History of colonic polyps 2018  
   
                                                      
  
  
Overview:Formatting of this note might be different from the original.  
Added automatically from request for surgery [id number]  
   
   
                                Gastroesophageal reflux disease 2018  
   
                                                      
  
  
Overview:Formatting of this note might be different from the original.  
Added automatically from request for surgery [id number]  
   
   
                                Acute pulmonary edema 10/05/2011      10/06/2011  
   
                                                      
  
  
Overview:Formatting of this note might be different from the original.  
10/5/2011  
cardiogenic and noncardiogenic factors  
  
   
   
                                Atelectasis     10/05/2011      10/06/2011  
   
                                Hypotension     10/04/2011      10/06/2011  
   
                                                      
  
  
Overview:Formatting of this note might be different from the original.  
10/4/2011  
goal map 65-80  
   
   
                                Stress hyperglycemia 10/04/2011      10/06/2011  
   
                                                      
  
  
Overview:Formatting of this note might be different from the original.  
10/4/2011  
Perioperative insulin resistance and exacerbation of hyperglycemia.  
Control blood glucose with titration of insulin infusion  
  
10/5/2011  
adequate control  
goal FBG<180  
  
   
   
                                Post-op pain    10/04/2011      10/06/2011  
   
                                Mechanically assisted ventilation 10/04/2011      
  10/05/2011  
   
                                                      
  
  
Overview:Formatting of this note might be different from the original.  
10/4/2011  
optimize MV settings, WTE  
current setting:FiO2, 75%, peep 8  
   
   
                                Cardiac insufficiency 10/04/2011      10/05/2011  
   
                                                      
  
  
Overview:Formatting of this note might be different from the original.  
10/4/2011  
RV mild to moderate post pump  
goal CI>2.3  
   
   
                                Hypoxemia       10/04/2011      10/06/2011  
   
                                discharge planning 2011      10/19/2011  
   
                                                      
  
  
Overview:Formatting of this note might be different from the original.  
age 71,  lives in [place], [place]. No DC needs.  
/  
   
   
                                Pre-op testing  2011      10/04/2011  
   
                                                      
  
  
Overview:Formatting of this note is different from the original.  
Images from the original note were not included.  
[facility]  
PRE-OP CHECKLIST  
  
Surgeon: Deangelo Angulo M.D. Informed Consent Completed: No  
STS Score: 1.4%  
CAD: Yes - CAD on Problem List: Yes  
Is intended procedure a CABG: Yes - is a beta blocker ordered? Yes  
  
H & P completed: Yes  
  
PA/LAT: Completed CT: Completed MRI: N/A LE US: N/A  
  
Cath: Yes - reviewed: Yes Echo:Completed EKG: Completed EF %: 61  
  
PI's: N/A Carotid: Completed Mapping: N/A Dental: Completed PFT's: N/A  
  
Basename 11 0729  
WBC 7.18  
HB 13.1  
HCT 41.8  
  
INR 1.0  
CREAT 1.35  
  
UA: Normal  
HCG:N/A  
  
ABO/ABO Confirmed: Yes  
  
Blood ordered: No  
  
SA Swab: Yes - results: Pending  
  
Last Dose of Anticoagulation: vitamins  
  
Op Note: N/A  
  
Pacemaker Check: N/A  
  
Consults: GI 2001  
  
DM: No  
  
Cardiac Surgical prep: No  
  
SIGNATURE: Krystal Castellanos RN CHECKED BY:  
DATE of SERVICE: 2011  
TIME of SERVICE: 2:23 PM  
  
  
   
   
                                Anemia of chronic disease 2011  
   
                                Intestinal polyp 2011  
   
                                                      
  
  
Overview:Formatting of this note might be different from the original.  
Distal ileum ulcerated polyp.  
   
   
                                Nonspecific abnormal results of liver function s  
tudy 2006  
   
                                Impotence of organic origin 2006  
   
                                Obesity, Class III, BMI 40-49.9 (morbid obesity)  
                 2021  
  
documented as of this encounter (statuses as of 2022)  
[facility]06- History of Past illness Narrative*   
  
                                Problem         Noted Date      Resolved Date  
   
                                Medicare annual wellness visit, subsequent 2019  
   
                                Encounter for long-term (current) use of medicat  
ions 2018  
   
                                                      
  
  
Overview:Formatting of this note might be different from the original.  
Added automatically from request for surgery [id number]  
   
   
                                History of colonic polyps 2018  
   
                                                      
  
  
Overview:Formatting of this note might be different from the original.  
Added automatically from request for surgery [id number]  
   
   
                                Gastroesophageal reflux disease 2018  
   
                                                      
  
  
Overview:Formatting of this note might be different from the original.  
Added automatically from request for surgery [id number]  
   
   
                                Acute pulmonary edema 10/05/2011      10/06/2011  
   
                                                      
  
  
Overview:Formatting of this note might be different from the original.  
10/5/2011  
cardiogenic and noncardiogenic factors  
  
   
   
                                Atelectasis     10/05/2011      10/06/2011  
   
                                Hypotension     10/04/2011      10/06/2011  
   
                                                      
  
  
Overview:Formatting of this note might be different from the original.  
10/4/2011  
goal map 65-80  
   
   
                                Stress hyperglycemia 10/04/2011      10/06/2011  
   
                                                      
  
  
Overview:Formatting of this note might be different from the original.  
10/4/2011  
Perioperative insulin resistance and exacerbation of hyperglycemia.  
Control blood glucose with titration of insulin infusion  
  
10/5/2011  
adequate control  
goal FBG<180  
  
   
   
                                Post-op pain    10/04/2011      10/06/2011  
   
                                Mechanically assisted ventilation 10/04/2011      
  10/05/2011  
   
                                                      
  
  
Overview:Formatting of this note might be different from the original.  
10/4/2011  
optimize MV settings, WTE  
current setting:FiO2, 75%, peep 8  
   
   
                                Cardiac insufficiency 10/04/2011      10/05/2011  
   
                                                      
  
  
Overview:Formatting of this note might be different from the original.  
10/4/2011  
RV mild to moderate post pump  
goal CI>2.3  
   
   
                                Hypoxemia       10/04/2011      10/06/2011  
   
                                discharge planning 2011      10/19/2011  
   
                                                      
  
  
Overview:Formatting of this note might be different from the original.  
age 71,  lives in [place], [place]. No DC needs.  
/  
   
   
                                Pre-op testing  2011      10/04/2011  
   
                                                      
  
  
Overview:Formatting of this note is different from the original.  
Images from the original note were not included.  
[facility]  
PRE-OP CHECKLIST  
  
Surgeon: Deangelo Angulo M.D. Informed Consent Completed: No  
STS Score: 1.4%  
CAD: Yes - CAD on Problem List: Yes  
Is intended procedure a CABG: Yes - is a beta blocker ordered? Yes  
  
H & P completed: Yes  
  
PA/LAT: Completed CT: Completed MRI: N/A LE US: N/A  
  
Cath: Yes - reviewed: Yes Echo:Completed EKG: Completed EF %: 61  
  
PI's: N/A Carotid: Completed Mapping: N/A Dental: Completed PFT's: N/A  
  
Basename 11 0729  
WBC 7.18  
HB 13.1  
HCT 41.8  
  
INR 1.0  
CREAT 1.35  
  
UA: Normal  
HCG:N/A  
  
ABO/ABO Confirmed: Yes  
  
Blood ordered: No  
  
SA Swab: Yes - results: Pending  
  
Last Dose of Anticoagulation: vitamins  
  
Op Note: N/A  
  
Pacemaker Check: N/A  
  
Consults: GI 2001  
  
DM: No  
  
Cardiac Surgical prep: No  
  
SIGNATURE: Krystal Castellanos RN CHECKED BY:  
DATE of SERVICE: 2011  
TIME of SERVICE: 2:23 PM  
  
  
   
   
                                Anemia of chronic disease 2011  
   
                                Intestinal polyp 2011  
   
                                                      
  
  
Overview:Formatting of this note might be different from the original.  
Distal ileum ulcerated polyp.  
   
   
                                Nonspecific abnormal results of liver function s  
pepe 2006  
   
                                Impotence of organic origin 2006  
   
                                Obesity, Class III, BMI 40-49.9 (morbid obesity)  
                 2021  
  
documented as of this encounter (statuses as of 2022)  
[facility]06- History of Past illness Narrative*   
  
                                Problem         Noted Date      Resolved Date  
   
                                Medicare annual wellness visit, subsequent 2019  
   
                                Encounter for long-term (current) use of medicat  
ions 2018  
   
                                                      
  
  
Overview:Formatting of this note might be different from the original.  
Added automatically from request for surgery [id number]  
   
   
                                History of colonic polyps 2018  
   
                                                      
  
  
Overview:Formatting of this note might be different from the original.  
Added automatically from request for surgery [id number]  
   
   
                                Gastroesophageal reflux disease 2018  
   
                                                      
  
  
Overview:Formatting of this note might be different from the original.  
Added automatically from request for surgery [id number]  
   
   
                                Acute pulmonary edema 10/05/2011      10/06/2011  
   
                                                      
  
  
Overview:Formatting of this note might be different from the original.  
10/5/2011  
cardiogenic and noncardiogenic factors  
  
   
   
                                Atelectasis     10/05/2011      10/06/2011  
   
                                Hypotension     10/04/2011      10/06/2011  
   
                                                      
  
  
Overview:Formatting of this note might be different from the original.  
10/4/2011  
goal map 65-80  
   
   
                                Stress hyperglycemia 10/04/2011      10/06/2011  
   
                                                      
  
  
Overview:Formatting of this note might be different from the original.  
10/4/2011  
Perioperative insulin resistance and exacerbation of hyperglycemia.  
Control blood glucose with titration of insulin infusion  
  
10/5/2011  
adequate control  
goal FBG<180  
  
   
   
                                Post-op pain    10/04/2011      10/06/2011  
   
                                Mechanically assisted ventilation 10/04/2011      
  10/05/2011  
   
                                                      
  
  
Overview:Formatting of this note might be different from the original.  
10/4/2011  
optimize MV settings, WTE  
current setting:FiO2, 75%, peep 8  
   
   
                                Cardiac insufficiency 10/04/2011      10/05/2011  
   
                                                      
  
  
Overview:Formatting of this note might be different from the original.  
10/4/2011  
RV mild to moderate post pump  
goal CI>2.3  
   
   
                                Hypoxemia       10/04/2011      10/06/2011  
   
                                discharge planning 2011      10/19/2011  
   
                                                      
  
  
Overview:Formatting of this note might be different from the original.  
age 71,  lives in [place], [place]. No DC needs.  
/  
   
   
                                Pre-op testing  2011      10/04/2011  
   
                                                      
  
  
Overview:Formatting of this note is different from the original.  
Images from the original note were not included.  
[facility]  
PRE-OP CHECKLIST  
  
Surgeon: Deangelo Angulo M.D. Informed Consent Completed: No  
STS Score: 1.4%  
CAD: Yes - CAD on Problem List: Yes  
Is intended procedure a CABG: Yes - is a beta blocker ordered? Yes  
  
H & P completed: Yes  
  
PA/LAT: Completed CT: Completed MRI: N/A LE US: N/A  
  
Cath: Yes - reviewed: Yes Echo:Completed EKG: Completed EF %: 61  
  
PI's: N/A Carotid: Completed Mapping: N/A Dental: Completed PFT's: N/A  
  
Basename 11 0729  
WBC 7.18  
HB 13.1  
HCT 41.8  
  
INR 1.0  
CREAT 1.35  
  
UA: Normal  
HCG:N/A  
  
ABO/ABO Confirmed: Yes  
  
Blood ordered: No  
  
SA Swab: Yes - results: Pending  
  
Last Dose of Anticoagulation: vitamins  
  
Op Note: N/A  
  
Pacemaker Check: N/A  
  
Consults: GI 2001  
  
DM: No  
  
Cardiac Surgical prep: No  
  
SIGNATURE: Krystal Castellanos RN CHECKED BY:  
DATE of SERVICE: 2011  
TIME of SERVICE: 2:23 PM  
  
  
   
   
                                Anemia of chronic disease 2011  
   
                                Intestinal polyp 2011  
   
                                                      
  
  
Overview:Formatting of this note might be different from the original.  
Distal ileum ulcerated polyp.  
   
   
                                Nonspecific abnormal results of liver function s  
nialldy 2006  
   
                                Impotence of organic origin 2006  
   
                                Obesity, Class III, BMI 40-49.9 (morbid obesity)  
                 2021  
  
documented as of this encounter (statuses as of 2022)  
[facility]- History of Past illness Narrative*   
  
                                Problem         Noted Date      Resolved Date  
   
                                Medicare annual wellness visit, subsequent 2019  
   
                                Encounter for long-term (current) use of medicat  
ions 2018  
   
                                                      
  
  
Overview:Formatting of this note might be different from the original.  
Added automatically from request for surgery [id number]  
   
   
                                History of colonic polyps 2018  
   
                                                      
  
  
Overview:Formatting of this note might be different from the original.  
Added automatically from request for surgery [id number]  
   
   
                                Gastroesophageal reflux disease 2018  
   
                                                      
  
  
Overview:Formatting of this note might be different from the original.  
Added automatically from request for surgery [id number]  
   
   
                                Acute pulmonary edema 10/05/2011      10/06/2011  
   
                                                      
  
  
Overview:Formatting of this note might be different from the original.  
10/5/2011  
cardiogenic and noncardiogenic factors  
  
   
   
                                Atelectasis     10/05/2011      10/06/2011  
   
                                Hypotension     10/04/2011      10/06/2011  
   
                                                      
  
  
Overview:Formatting of this note might be different from the original.  
10/4/2011  
goal map 65-80  
   
   
                                Stress hyperglycemia 10/04/2011      10/06/2011  
   
                                                      
  
  
Overview:Formatting of this note might be different from the original.  
10/4/2011  
Perioperative insulin resistance and exacerbation of hyperglycemia.  
Control blood glucose with titration of insulin infusion  
  
10/5/2011  
adequate control  
goal FBG<180  
  
   
   
                                Post-op pain    10/04/2011      10/06/2011  
   
                                Mechanically assisted ventilation 10/04/2011      
  10/05/2011  
   
                                                      
  
  
Overview:Formatting of this note might be different from the original.  
10/4/2011  
optimize MV settings, WTE  
current setting:FiO2, 75%, peep 8  
   
   
                                Cardiac insufficiency 10/04/2011      10/05/2011  
   
                                                      
  
  
Overview:Formatting of this note might be different from the original.  
10/4/2011  
RV mild to moderate post pump  
goal CI>2.3  
   
   
                                Hypoxemia       10/04/2011      10/06/2011  
   
                                discharge planning 2011      10/19/2011  
   
                                                      
  
  
Overview:Formatting of this note might be different from the original.  
age 71,  lives in [place], [place]. No DC needs.  
/  
   
   
                                Pre-op testing  2011      10/04/2011  
   
                                                      
  
  
Overview:Formatting of this note is different from the original.  
Images from the original note were not included.  
[facility]  
PRE-OP CHECKLIST  
  
Surgeon: Deangelo Angulo M.D. Informed Consent Completed: No  
STS Score: 1.4%  
CAD: Yes - CAD on Problem List: Yes  
Is intended procedure a CABG: Yes - is a beta blocker ordered? Yes  
  
H & P completed: Yes  
  
PA/LAT: Completed CT: Completed MRI: N/A LE US: N/A  
  
Cath: Yes - reviewed: Yes Echo:Completed EKG: Completed EF %: 61  
  
PI's: N/A Carotid: Completed Mapping: N/A Dental: Completed PFT's: N/A  
  
Basename 11 0729  
WBC 7.18  
HB 13.1  
HCT 41.8  
  
INR 1.0  
CREAT 1.35  
  
UA: Normal  
HCG:N/A  
  
ABO/ABO Confirmed: Yes  
  
Blood ordered: No  
  
SA Swab: Yes - results: Pending  
  
Last Dose of Anticoagulation: vitamins  
  
Op Note: N/A  
  
Pacemaker Check: N/A  
  
Consults: GI 2001  
  
DM: No  
  
Cardiac Surgical prep: No  
  
SIGNATURE: Krystal Castellanos RN CHECKED BY:  
DATE of SERVICE: 2011  
TIME of SERVICE: 2:23 PM  
  
  
   
   
                                Anemia of chronic disease 2011  
   
                                Intestinal polyp 2011  
   
                                                      
  
  
Overview:Formatting of this note might be different from the original.  
Distal ileum ulcerated polyp.  
   
   
                                Nonspecific abnormal results of liver function s  
tudy 2006  
   
                                Impotence of organic origin 2006  
   
                                Obesity, Class III, BMI 40-49.9 (morbid obesity)  
                 2021  
  
documented as of this encounter (statuses as of 2022)  
[facility]- History of Past illness Narrative*   
  
                                Problem         Noted Date      Resolved Date  
   
                                Medicare annual wellness visit, subsequent 2019  
   
                                Encounter for long-term (current) use of medicat  
ions 2018  
   
                                                      
  
  
Overview:Formatting of this note might be different from the original.  
Added automatically from request for surgery [id number]  
   
   
                                History of colonic polyps 2018  
   
                                                      
  
  
Overview:Formatting of this note might be different from the original.  
Added automatically from request for surgery [id number]  
   
   
                                Gastroesophageal reflux disease 2018  
   
                                                      
  
  
Overview:Formatting of this note might be different from the original.  
Added automatically from request for surgery [id number]  
   
   
                                Acute pulmonary edema 10/05/2011      10/06/2011  
   
                                                      
  
  
Overview:Formatting of this note might be different from the original.  
10/5/2011  
cardiogenic and noncardiogenic factors  
  
   
   
                                Atelectasis     10/05/2011      10/06/2011  
   
                                Hypotension     10/04/2011      10/06/2011  
   
                                                      
  
  
Overview:Formatting of this note might be different from the original.  
10/4/2011  
goal map 65-80  
   
   
                                Stress hyperglycemia 10/04/2011      10/06/2011  
   
                                                      
  
  
Overview:Formatting of this note might be different from the original.  
10/4/2011  
Perioperative insulin resistance and exacerbation of hyperglycemia.  
Control blood glucose with titration of insulin infusion  
  
10/5/2011  
adequate control  
goal FBG<180  
  
   
   
                                Post-op pain    10/04/2011      10/06/2011  
   
                                Mechanically assisted ventilation 10/04/2011      
  10/05/2011  
   
                                                      
  
  
Overview:Formatting of this note might be different from the original.  
10/4/2011  
optimize MV settings, WTE  
current setting:FiO2, 75%, peep 8  
   
   
                                Cardiac insufficiency 10/04/2011      10/05/2011  
   
                                                      
  
  
Overview:Formatting of this note might be different from the original.  
10/4/2011  
RV mild to moderate post pump  
goal CI>2.3  
   
   
                                Hypoxemia       10/04/2011      10/06/2011  
   
                                discharge planning 2011      10/19/2011  
   
                                                      
  
  
Overview:Formatting of this note might be different from the original.  
age 71,  lives in [place], [place]. No DC needs.  
/  
   
   
                                Pre-op testing  2011      10/04/2011  
   
                                                      
  
  
Overview:Formatting of this note is different from the original.  
Images from the original note were not included.  
[facility]  
PRE-OP CHECKLIST  
  
Surgeon: Deangelo Angulo M.D. Informed Consent Completed: No  
STS Score: 1.4%  
CAD: Yes - CAD on Problem List: Yes  
Is intended procedure a CABG: Yes - is a beta blocker ordered? Yes  
  
H & P completed: Yes  
  
PA/LAT: Completed CT: Completed MRI: N/A LE US: N/A  
  
Cath: Yes - reviewed: Yes Echo:Completed EKG: Completed EF %: 61  
  
PI's: N/A Carotid: Completed Mapping: N/A Dental: Completed PFT's: N/A  
  
Basename 11 0729  
WBC 7.18  
HB 13.1  
HCT 41.8  
  
INR 1.0  
CREAT 1.35  
  
UA: Normal  
HCG:N/A  
  
ABO/ABO Confirmed: Yes  
  
Blood ordered: No  
  
SA Swab: Yes - results: Pending  
  
Last Dose of Anticoagulation: vitamins  
  
Op Note: N/A  
  
Pacemaker Check: N/A  
  
Consults: GI 2001  
  
DM: No  
  
Cardiac Surgical prep: No  
  
SIGNATURE: Krystal Castellanos RN CHECKED BY:  
DATE of SERVICE: 2011  
TIME of SERVICE: 2:23 PM  
  
  
   
   
                                Anemia of chronic disease 2011  
   
                                Intestinal polyp 2011  
   
                                                      
  
  
Overview:Formatting of this note might be different from the original.  
Distal ileum ulcerated polyp.  
   
   
                                Nonspecific abnormal results of liver function s  
pepe 2006  
   
                                Impotence of organic origin 2006      09/0  
2015  
   
                                Obesity, Class III, BMI 40-49.9 (morbid obesity)  
                 2021  
  
documented as of this encounter (statuses as of 2022)  
[facility]- History of Past illness Narrative*   
  
                                Problem         Noted Date      Resolved Date  
   
                                Medicare annual wellness visit, subsequent 2019  
   
                                Encounter for long-term (current) use of medicat  
ions 2018  
   
                                                      
  
  
Overview:Formatting of this note might be different from the original.  
Added automatically from request for surgery [id number]  
   
   
                                History of colonic polyps 2018  
   
                                                      
  
  
Overview:Formatting of this note might be different from the original.  
Added automatically from request for surgery [id number]  
   
   
                                Gastroesophageal reflux disease 2018  
   
                                                      
  
  
Overview:Formatting of this note might be different from the original.  
Added automatically from request for surgery [id number]  
   
   
                                Acute pulmonary edema 10/05/2011      10/06/2011  
   
                                                      
  
  
Overview:Formatting of this note might be different from the original.  
10/5/2011  
cardiogenic and noncardiogenic factors  
  
   
   
                                Atelectasis     10/05/2011      10/06/2011  
   
                                Hypotension     10/04/2011      10/06/2011  
   
                                                      
  
  
Overview:Formatting of this note might be different from the original.  
10/4/2011  
goal map 65-80  
   
   
                                Stress hyperglycemia 10/04/2011      10/06/2011  
   
                                                      
  
  
Overview:Formatting of this note might be different from the original.  
10/4/2011  
Perioperative insulin resistance and exacerbation of hyperglycemia.  
Control blood glucose with titration of insulin infusion  
  
10/5/2011  
adequate control  
goal FBG<180  
  
   
   
                                Post-op pain    10/04/2011      10/06/2011  
   
                                Mechanically assisted ventilation 10/04/2011      
  10/05/2011  
   
                                                      
  
  
Overview:Formatting of this note might be different from the original.  
10/4/2011  
optimize MV settings, WTE  
current setting:FiO2, 75%, peep 8  
   
   
                                Cardiac insufficiency 10/04/2011      10/05/2011  
   
                                                      
  
  
Overview:Formatting of this note might be different from the original.  
10/4/2011  
RV mild to moderate post pump  
goal CI>2.3  
   
   
                                Hypoxemia       10/04/2011      10/06/2011  
   
                                discharge planning 2011      10/19/2011  
   
                                                      
  
  
Overview:Formatting of this note might be different from the original.  
age 71,  lives in [place], [place]. No DC needs.  
/  
   
   
                                Pre-op testing  2011      10/04/2011  
   
                                                      
  
  
Overview:Formatting of this note is different from the original.  
Images from the original note were not included.  
[facility]  
PRE-OP CHECKLIST  
  
Surgeon: Deangelo Angulo M.D. Informed Consent Completed: No  
STS Score: 1.4%  
CAD: Yes - CAD on Problem List: Yes  
Is intended procedure a CABG: Yes - is a beta blocker ordered? Yes  
  
H & P completed: Yes  
  
PA/LAT: Completed CT: Completed MRI: N/A LE US: N/A  
  
Cath: Yes - reviewed: Yes Echo:Completed EKG: Completed EF %: 61  
  
PI's: N/A Carotid: Completed Mapping: N/A Dental: Completed PFT's: N/A  
  
Basename 11 0729  
WBC 7.18  
HB 13.1  
HCT 41.8  
  
INR 1.0  
CREAT 1.35  
  
UA: Normal  
HCG:N/A  
  
ABO/ABO Confirmed: Yes  
  
Blood ordered: No  
  
SA Swab: Yes - results: Pending  
  
Last Dose of Anticoagulation: vitamins  
  
Op Note: N/A  
  
Pacemaker Check: N/A  
  
Consults: GI 2001  
  
DM: No  
  
Cardiac Surgical prep: No  
  
SIGNATURE: Krystal Castellanos RN CHECKED BY:  
DATE of SERVICE: 2011  
TIME of SERVICE: 2:23 PM  
  
  
   
   
                                Anemia of chronic disease 2011  
   
                                Intestinal polyp 2011  
   
                                                      
  
  
Overview:Formatting of this note might be different from the original.  
Distal ileum ulcerated polyp.  
   
   
                                Nonspecific abnormal results of liver function s  
pepe 2006  
   
                                Impotence of organic origin 2006  
   
                                Obesity, Class III, BMI 40-49.9 (morbid obesity)  
                 2021  
  
documented as of this encounter (statuses as of 2023)  
[facility]06- History of Past illness Narrative*   
  
                                Problem         Noted Date      Resolved Date  
   
                                Medicare annual wellness visit, subsequent 2019  
   
                                Encounter for long-term (current) use of medicat  
ions 2018  
   
                                                      
  
  
Overview:Formatting of this note might be different from the original.  
Added automatically from request for surgery [id number]  
   
   
                                History of colonic polyps 2018  
   
                                                      
  
  
Overview:Formatting of this note might be different from the original.  
Added automatically from request for surgery [id number]  
   
   
                                Gastroesophageal reflux disease 2018  
   
                                                      
  
  
Overview:Formatting of this note might be different from the original.  
Added automatically from request for surgery [id number]  
   
   
                                Acute pulmonary edema 10/05/2011      10/06/2011  
   
                                                      
  
  
Overview:Formatting of this note might be different from the original.  
10/5/2011  
cardiogenic and noncardiogenic factors  
  
   
   
                                Atelectasis     10/05/2011      10/06/2011  
   
                                Hypotension     10/04/2011      10/06/2011  
   
                                                      
  
  
Overview:Formatting of this note might be different from the original.  
10/4/2011  
goal map 65-80  
   
   
                                Stress hyperglycemia 10/04/2011      10/06/2011  
   
                                                      
  
  
Overview:Formatting of this note might be different from the original.  
10/4/2011  
Perioperative insulin resistance and exacerbation of hyperglycemia.  
Control blood glucose with titration of insulin infusion  
  
10/5/2011  
adequate control  
goal FBG<180  
  
   
   
                                Post-op pain    10/04/2011      10/06/2011  
   
                                Mechanically assisted ventilation 10/04/2011      
  10/05/2011  
   
                                                      
  
  
Overview:Formatting of this note might be different from the original.  
10/4/2011  
optimize MV settings, WTE  
current setting:FiO2, 75%, peep 8  
   
   
                                Cardiac insufficiency 10/04/2011      10/05/2011  
   
                                                      
  
  
Overview:Formatting of this note might be different from the original.  
10/4/2011  
RV mild to moderate post pump  
goal CI>2.3  
   
   
                                Hypoxemia       10/04/2011      10/06/2011  
   
                                discharge planning 2011      10/19/2011  
   
                                                      
  
  
Overview:Formatting of this note might be different from the original.  
age 71,  lives in [place], [place]. No DC needs.  
/  
   
   
                                Pre-op testing  2011      10/04/2011  
   
                                                      
  
  
Overview:Formatting of this note is different from the original.  
Images from the original note were not included.  
[facility]  
PRE-OP CHECKLIST  
  
Surgeon: Deangelo Angulo M.D. Informed Consent Completed: No  
STS Score: 1.4%  
CAD: Yes - CAD on Problem List: Yes  
Is intended procedure a CABG: Yes - is a beta blocker ordered? Yes  
  
H & P completed: Yes  
  
PA/LAT: Completed CT: Completed MRI: N/A LE US: N/A  
  
Cath: Yes - reviewed: Yes Echo:Completed EKG: Completed EF %: 61  
  
PI's: N/A Carotid: Completed Mapping: N/A Dental: Completed PFT's: N/A  
  
Basename 11 0729  
WBC 7.18  
HB 13.1  
HCT 41.8  
  
INR 1.0  
CREAT 1.35  
  
UA: Normal  
HCG:N/A  
  
ABO/ABO Confirmed: Yes  
  
Blood ordered: No  
  
SA Swab: Yes - results: Pending  
  
Last Dose of Anticoagulation: vitamins  
  
Op Note: N/A  
  
Pacemaker Check: N/A  
  
Consults: GI 2001  
  
DM: No  
  
Cardiac Surgical prep: No  
  
SIGNATURE: Krystal Castellanos RN CHECKED BY:  
DATE of SERVICE: 2011  
TIME of SERVICE: 2:23 PM  
  
  
   
   
                                Anemia of chronic disease 2011  
   
                                Intestinal polyp 2011  
   
                                                      
  
  
Overview:Formatting of this note might be different from the original.  
Distal ileum ulcerated polyp.  
   
   
                                Nonspecific abnormal results of liver function s  
nialldy 2006  
   
                                Impotence of organic origin 2006  
   
                                Obesity, Class III, BMI 40-49.9 (morbid obesity)  
                 2021  
  
documented as of this encounter (statuses as of 2023)  
[facility]06- History of Past illness Narrative*   
  
                                Problem         Noted Date      Resolved Date  
   
                                Medicare annual wellness visit, subsequent 2019  
   
                                Encounter for long-term (current) use of medicat  
ions 2018  
   
                                                      
  
  
Overview:Formatting of this note might be different from the original.  
Added automatically from request for surgery [id number]  
   
   
                                History of colonic polyps 2018  
   
                                                      
  
  
Overview:Formatting of this note might be different from the original.  
Added automatically from request for surgery [id number]  
   
   
                                Gastroesophageal reflux disease 2018  
   
                                                      
  
  
Overview:Formatting of this note might be different from the original.  
Added automatically from request for surgery [id number]  
   
   
                                Acute pulmonary edema 10/05/2011      10/06/2011  
   
                                                      
  
  
Overview:Formatting of this note might be different from the original.  
10/5/2011  
cardiogenic and noncardiogenic factors  
  
   
   
                                Atelectasis     10/05/2011      10/06/2011  
   
                                Hypotension     10/04/2011      10/06/2011  
   
                                                      
  
  
Overview:Formatting of this note might be different from the original.  
10/4/2011  
goal map 65-80  
   
   
                                Stress hyperglycemia 10/04/2011      10/06/2011  
   
                                                      
  
  
Overview:Formatting of this note might be different from the original.  
10/4/2011  
Perioperative insulin resistance and exacerbation of hyperglycemia.  
Control blood glucose with titration of insulin infusion  
  
10/5/2011  
adequate control  
goal FBG<180  
  
   
   
                                Post-op pain    10/04/2011      10/06/2011  
   
                                Mechanically assisted ventilation 10/04/2011      
  10/05/2011  
   
                                                      
  
  
Overview:Formatting of this note might be different from the original.  
10/4/2011  
optimize MV settings, WTE  
current setting:FiO2, 75%, peep 8  
   
   
                                Cardiac insufficiency 10/04/2011      10/05/2011  
   
                                                      
  
  
Overview:Formatting of this note might be different from the original.  
10/4/2011  
RV mild to moderate post pump  
goal CI>2.3  
   
   
                                Hypoxemia       10/04/2011      10/06/2011  
   
                                discharge planning 2011      10/19/2011  
   
                                                      
  
  
Overview:Formatting of this note might be different from the original.  
age 71,  lives in [place], [place]. No DC needs.  
/  
   
   
                                Pre-op testing  2011      10/04/2011  
   
                                                      
  
  
Overview:Formatting of this note is different from the original.  
Images from the original note were not included.  
[facility]  
PRE-OP CHECKLIST  
  
Surgeon: Deangelo Angulo M.D. Informed Consent Completed: No  
STS Score: 1.4%  
CAD: Yes - CAD on Problem List: Yes  
Is intended procedure a CABG: Yes - is a beta blocker ordered? Yes  
  
H & P completed: Yes  
  
PA/LAT: Completed CT: Completed MRI: N/A LE US: N/A  
  
Cath: Yes - reviewed: Yes Echo:Completed EKG: Completed EF %: 61  
  
PI's: N/A Carotid: Completed Mapping: N/A Dental: Completed PFT's: N/A  
  
Basename 11 0729  
WBC 7.18  
HB 13.1  
HCT 41.8  
  
INR 1.0  
CREAT 1.35  
  
UA: Normal  
HCG:N/A  
  
ABO/ABO Confirmed: Yes  
  
Blood ordered: No  
  
SA Swab: Yes - results: Pending  
  
Last Dose of Anticoagulation: vitamins  
  
Op Note: N/A  
  
Pacemaker Check: N/A  
  
Consults: GI 2001  
  
DM: No  
  
Cardiac Surgical prep: No  
  
SIGNATURE: Krystal Castellanos RN CHECKED BY:  
DATE of SERVICE: 2011  
TIME of SERVICE: 2:23 PM  
  
  
   
   
                                Anemia of chronic disease 2011  
   
                                Intestinal polyp 2011  
   
                                                      
  
  
Overview:Formatting of this note might be different from the original.  
Distal ileum ulcerated polyp.  
   
   
                                Nonspecific abnormal results of liver function s  
tudy 2006  
   
                                Impotence of organic origin 2006  
   
                                Obesity, Class III, BMI 40-49.9 (morbid obesity)  
                 2021  
  
documented as of this encounter (statuses as of 2023)  
[facility]- History of Past illness Narrative*   
  
                                Problem         Noted Date      Resolved Date  
   
                                Medicare annual wellness visit, subsequent 2019  
   
                                Encounter for long-term (current) use of medicat  
ions 2018  
   
                                                      
  
  
Overview:Formatting of this note might be different from the original.  
Added automatically from request for surgery [id number]  
   
   
                                History of colonic polyps 2018  
   
                                                      
  
  
Overview:Formatting of this note might be different from the original.  
Added automatically from request for surgery [id number]  
   
   
                                Gastroesophageal reflux disease 2018  
   
                                                      
  
  
Overview:Formatting of this note might be different from the original.  
Added automatically from request for surgery [id number]  
   
   
                                Acute pulmonary edema 10/05/2011      10/06/2011  
   
                                                      
  
  
Overview:Formatting of this note might be different from the original.  
10/5/2011  
cardiogenic and noncardiogenic factors  
  
   
   
                                Atelectasis     10/05/2011      10/06/2011  
   
                                Hypotension     10/04/2011      10/06/2011  
   
                                                      
  
  
Overview:Formatting of this note might be different from the original.  
10/4/2011  
goal map 65-80  
   
   
                                Stress hyperglycemia 10/04/2011      10/06/2011  
   
                                                      
  
  
Overview:Formatting of this note might be different from the original.  
10/4/2011  
Perioperative insulin resistance and exacerbation of hyperglycemia.  
Control blood glucose with titration of insulin infusion  
  
10/5/2011  
adequate control  
goal FBG<180  
  
   
   
                                Post-op pain    10/04/2011      10/06/2011  
   
                                Mechanically assisted ventilation 10/04/2011      
  10/05/2011  
   
                                                      
  
  
Overview:Formatting of this note might be different from the original.  
10/4/2011  
optimize MV settings, WTE  
current setting:FiO2, 75%, peep 8  
   
   
                                Cardiac insufficiency 10/04/2011      10/05/2011  
   
                                                      
  
  
Overview:Formatting of this note might be different from the original.  
10/4/2011  
RV mild to moderate post pump  
goal CI>2.3  
   
   
                                Hypoxemia       10/04/2011      10/06/2011  
   
                                discharge planning 2011      10/19/2011  
   
                                                      
  
  
Overview:Formatting of this note might be different from the original.  
age 71,  lives in [place], [place]. No DC needs.  
/  
   
   
                                Pre-op testing  2011      10/04/2011  
   
                                                      
  
  
Overview:Formatting of this note is different from the original.  
Images from the original note were not included.  
[facility]  
PRE-OP CHECKLIST  
  
Surgeon: Deangelo Angulo M.D. Informed Consent Completed: No  
STS Score: 1.4%  
CAD: Yes - CAD on Problem List: Yes  
Is intended procedure a CABG: Yes - is a beta blocker ordered? Yes  
  
H & P completed: Yes  
  
PA/LAT: Completed CT: Completed MRI: N/A LE US: N/A  
  
Cath: Yes - reviewed: Yes Echo:Completed EKG: Completed EF %: 61  
  
PI's: N/A Carotid: Completed Mapping: N/A Dental: Completed PFT's: N/A  
  
Basename 11 0729  
WBC 7.18  
HB 13.1  
HCT 41.8  
  
INR 1.0  
CREAT 1.35  
  
UA: Normal  
HCG:N/A  
  
ABO/ABO Confirmed: Yes  
  
Blood ordered: No  
  
SA Swab: Yes - results: Pending  
  
Last Dose of Anticoagulation: vitamins  
  
Op Note: N/A  
  
Pacemaker Check: N/A  
  
Consults: GI 2001  
  
DM: No  
  
Cardiac Surgical prep: No  
  
SIGNATURE: Krystal Castellanos RN CHECKED BY:  
DATE of SERVICE: 2011  
TIME of SERVICE: 2:23 PM  
  
  
   
   
                                Anemia of chronic disease 2011  
   
                                Intestinal polyp 2011  
   
                                                      
  
  
Overview:Formatting of this note might be different from the original.  
Distal ileum ulcerated polyp.  
   
   
                                Nonspecific abnormal results of liver function s  
tudy 2006  
   
                                Impotence of organic origin 2006  
   
                                Obesity, Class III, BMI 40-49.9 (morbid obesity)  
                 2021  
  
documented as of this encounter (statuses as of 2023)  
[facility]- History of Past illness Narrative*   
  
                                Problem         Noted Date      Resolved Date  
   
                                Medicare annual wellness visit, subsequent 2019  
   
                                Encounter for long-term (current) use of medicat  
ions 2018  
   
                                                      
  
  
Overview:Formatting of this note might be different from the original.  
Added automatically from request for surgery [id number]  
   
   
                                History of colonic polyps 2018  
   
                                                      
  
  
Overview:Formatting of this note might be different from the original.  
Added automatically from request for surgery [id number]  
   
   
                                Gastroesophageal reflux disease 2018  
   
                                                      
  
  
Overview:Formatting of this note might be different from the original.  
Added automatically from request for surgery [id number]  
   
   
                                Acute pulmonary edema 10/05/2011      10/06/2011  
   
                                                      
  
  
Overview:Formatting of this note might be different from the original.  
10/5/2011  
cardiogenic and noncardiogenic factors  
  
   
   
                                Atelectasis     10/05/2011      10/06/2011  
   
                                Hypotension     10/04/2011      10/06/2011  
   
                                                      
  
  
Overview:Formatting of this note might be different from the original.  
10/4/2011  
goal map 65-80  
   
   
                                Stress hyperglycemia 10/04/2011      10/06/2011  
   
                                                      
  
  
Overview:Formatting of this note might be different from the original.  
10/4/2011  
Perioperative insulin resistance and exacerbation of hyperglycemia.  
Control blood glucose with titration of insulin infusion  
  
10/5/2011  
adequate control  
goal FBG<180  
  
   
   
                                Post-op pain    10/04/2011      10/06/2011  
   
                                Mechanically assisted ventilation 10/04/2011      
  10/05/2011  
   
                                                      
  
  
Overview:Formatting of this note might be different from the original.  
10/4/2011  
optimize MV settings, WTE  
current setting:FiO2, 75%, peep 8  
   
   
                                Cardiac insufficiency 10/04/2011      10/05/2011  
   
                                                      
  
  
Overview:Formatting of this note might be different from the original.  
10/4/2011  
RV mild to moderate post pump  
goal CI>2.3  
   
   
                                Hypoxemia       10/04/2011      10/06/2011  
   
                                discharge planning 2011      10/19/2011  
   
                                                      
  
  
Overview:Formatting of this note might be different from the original.  
age 71,  lives in [place], [place]. No DC needs.  
/  
   
   
                                Pre-op testing  2011      10/04/2011  
   
                                                      
  
  
Overview:Formatting of this note is different from the original.  
Images from the original note were not included.  
[facility]  
PRE-OP CHECKLIST  
  
Surgeon: Deangelo Angulo M.D. Informed Consent Completed: No  
STS Score: 1.4%  
CAD: Yes - CAD on Problem List: Yes  
Is intended procedure a CABG: Yes - is a beta blocker ordered? Yes  
  
H & P completed: Yes  
  
PA/LAT: Completed CT: Completed MRI: N/A LE US: N/A  
  
Cath: Yes - reviewed: Yes Echo:Completed EKG: Completed EF %: 61  
  
PI's: N/A Carotid: Completed Mapping: N/A Dental: Completed PFT's: N/A  
  
Basename 11 0729  
WBC 7.18  
HB 13.1  
HCT 41.8  
  
INR 1.0  
CREAT 1.35  
  
UA: Normal  
HCG:N/A  
  
ABO/ABO Confirmed: Yes  
  
Blood ordered: No  
  
SA Swab: Yes - results: Pending  
  
Last Dose of Anticoagulation: vitamins  
  
Op Note: N/A  
  
Pacemaker Check: N/A  
  
Consults: GI 2001  
  
DM: No  
  
Cardiac Surgical prep: No  
  
SIGNATURE: Krystal Castellanos RN CHECKED BY:  
DATE of SERVICE: 2011  
TIME of SERVICE: 2:23 PM  
  
  
   
   
                                Anemia of chronic disease 2011  
   
                                Intestinal polyp 2011  
   
                                                      
  
  
Overview:Formatting of this note might be different from the original.  
Distal ileum ulcerated polyp.  
   
   
                                Nonspecific abnormal results of liver function s  
nialldy 2006  
   
                                Impotence of organic origin 2006  
   
                                Obesity, Class III, BMI 40-49.9 (morbid obesity)  
                 2021  
  
documented as of this encounter (statuses as of 2023)  
[facility]- History of Past illness Narrative*   
  
                                Problem         Noted Date      Resolved Date  
   
                                Medicare annual wellness visit, subsequent 2019  
   
                                Encounter for long-term (current) use of medicat  
ions 2018  
   
                                                      
  
  
Overview:Formatting of this note might be different from the original.  
Added automatically from request for surgery [id number]  
   
   
                                History of colonic polyps 2018  
   
                                                      
  
  
Overview:Formatting of this note might be different from the original.  
Added automatically from request for surgery [id number]  
   
   
                                Gastroesophageal reflux disease 2018  
   
                                                      
  
  
Overview:Formatting of this note might be different from the original.  
Added automatically from request for surgery [id number]  
   
   
                                Acute pulmonary edema 10/05/2011      10/06/2011  
   
                                                      
  
  
Overview:Formatting of this note might be different from the original.  
10/5/2011  
cardiogenic and noncardiogenic factors  
  
   
   
                                Atelectasis     10/05/2011      10/06/2011  
   
                                Hypotension     10/04/2011      10/06/2011  
   
                                                      
  
  
Overview:Formatting of this note might be different from the original.  
10/4/2011  
goal map 65-80  
   
   
                                Stress hyperglycemia 10/04/2011      10/06/2011  
   
                                                      
  
  
Overview:Formatting of this note might be different from the original.  
10/4/2011  
Perioperative insulin resistance and exacerbation of hyperglycemia.  
Control blood glucose with titration of insulin infusion  
  
10/5/2011  
adequate control  
goal FBG<180  
  
   
   
                                Post-op pain    10/04/2011      10/06/2011  
   
                                Mechanically assisted ventilation 10/04/2011      
  10/05/2011  
   
                                                      
  
  
Overview:Formatting of this note might be different from the original.  
10/4/2011  
optimize MV settings, WTE  
current setting:FiO2, 75%, peep 8  
   
   
                                Cardiac insufficiency 10/04/2011      10/05/2011  
   
                                                      
  
  
Overview:Formatting of this note might be different from the original.  
10/4/2011  
RV mild to moderate post pump  
goal CI>2.3  
   
   
                                Hypoxemia       10/04/2011      10/06/2011  
   
                                discharge planning 2011      10/19/2011  
   
                                                      
  
  
Overview:Formatting of this note might be different from the original.  
age 71,  lives in [place], [place]. No DC needs.  
/  
   
   
                                Pre-op testing  2011      10/04/2011  
   
                                                      
  
  
Overview:Formatting of this note is different from the original.  
Images from the original note were not included.  
[facility]  
PRE-OP CHECKLIST  
  
Surgeon: Deangelo Angulo M.D. Informed Consent Completed: No  
STS Score: 1.4%  
CAD: Yes - CAD on Problem List: Yes  
Is intended procedure a CABG: Yes - is a beta blocker ordered? Yes  
  
H & P completed: Yes  
  
PA/LAT: Completed CT: Completed MRI: N/A LE US: N/A  
  
Cath: Yes - reviewed: Yes Echo:Completed EKG: Completed EF %: 61  
  
PI's: N/A Carotid: Completed Mapping: N/A Dental: Completed PFT's: N/A  
  
Basename 11 0729  
WBC 7.18  
HB 13.1  
HCT 41.8  
  
INR 1.0  
CREAT 1.35  
  
UA: Normal  
HCG:N/A  
  
ABO/ABO Confirmed: Yes  
  
Blood ordered: No  
  
SA Swab: Yes - results: Pending  
  
Last Dose of Anticoagulation: vitamins  
  
Op Note: N/A  
  
Pacemaker Check: N/A  
  
Consults: GI 2001  
  
DM: No  
  
Cardiac Surgical prep: No  
  
SIGNATURE: Krystal Castellanos RN CHECKED BY:  
DATE of SERVICE: 2011  
TIME of SERVICE: 2:23 PM  
  
  
   
   
                                Anemia of chronic disease 2011  
   
                                Intestinal polyp 2011  
   
                                                      
  
  
Overview:Formatting of this note might be different from the original.  
Distal ileum ulcerated polyp.  
   
   
                                Nonspecific abnormal results of liver function s  
tudy 2006  
   
                                Impotence of organic origin 2006  
   
                                Obesity, Class III, BMI 40-49.9 (morbid obesity)  
                 2021  
  
documented as of this encounter (statuses as of 2023)  
[facility]06- History of Past illness Narrative*   
  
                                Problem         Noted Date      Resolved Date  
   
                                Medicare annual wellness visit, subsequent 2019  
   
                                Encounter for long-term (current) use of medicat  
ions 2018  
   
                                                      
  
  
Overview:Formatting of this note might be different from the original.  
Added automatically from request for surgery [id number]  
   
   
                                History of colonic polyps 2018  
   
                                                      
  
  
Overview:Formatting of this note might be different from the original.  
Added automatically from request for surgery [id number]  
   
   
                                Gastroesophageal reflux disease 2018  
   
                                                      
  
  
Overview:Formatting of this note might be different from the original.  
Added automatically from request for surgery [id number]  
   
   
                                Acute pulmonary edema 10/05/2011      10/06/2011  
   
                                                      
  
  
Overview:Formatting of this note might be different from the original.  
10/5/2011  
cardiogenic and noncardiogenic factors  
  
   
   
                                Atelectasis     10/05/2011      10/06/2011  
   
                                Hypotension     10/04/2011      10/06/2011  
   
                                                      
  
  
Overview:Formatting of this note might be different from the original.  
10/4/2011  
goal map 65-80  
   
   
                                Stress hyperglycemia 10/04/2011      10/06/2011  
   
                                                      
  
  
Overview:Formatting of this note might be different from the original.  
10/4/2011  
Perioperative insulin resistance and exacerbation of hyperglycemia.  
Control blood glucose with titration of insulin infusion  
  
10/5/2011  
adequate control  
goal FBG<180  
  
   
   
                                Post-op pain    10/04/2011      10/06/2011  
   
                                Mechanically assisted ventilation 10/04/2011      
  10/05/2011  
   
                                                      
  
  
Overview:Formatting of this note might be different from the original.  
10/4/2011  
optimize MV settings, WTE  
current setting:FiO2, 75%, peep 8  
   
   
                                Cardiac insufficiency 10/04/2011      10/05/2011  
   
                                                      
  
  
Overview:Formatting of this note might be different from the original.  
10/4/2011  
RV mild to moderate post pump  
goal CI>2.3  
   
   
                                Hypoxemia       10/04/2011      10/06/2011  
   
                                discharge planning 2011      10/19/2011  
   
                                                      
  
  
Overview:Formatting of this note might be different from the original.  
age 71,  lives in [place], [place]. No DC needs.  
/  
   
   
                                Pre-op testing  2011      10/04/2011  
   
                                                      
  
  
Overview:Formatting of this note is different from the original.  
Images from the original note were not included.  
[facility]  
PRE-OP CHECKLIST  
  
Surgeon: Deangelo Angulo M.D. Informed Consent Completed: No  
STS Score: 1.4%  
CAD: Yes - CAD on Problem List: Yes  
Is intended procedure a CABG: Yes - is a beta blocker ordered? Yes  
  
H & P completed: Yes  
  
PA/LAT: Completed CT: Completed MRI: N/A LE US: N/A  
  
Cath: Yes - reviewed: Yes Echo:Completed EKG: Completed EF %: 61  
  
PI's: N/A Carotid: Completed Mapping: N/A Dental: Completed PFT's: N/A  
  
Basename 11 0729  
WBC 7.18  
HB 13.1  
HCT 41.8  
  
INR 1.0  
CREAT 1.35  
  
UA: Normal  
HCG:N/A  
  
ABO/ABO Confirmed: Yes  
  
Blood ordered: No  
  
SA Swab: Yes - results: Pending  
  
Last Dose of Anticoagulation: vitamins  
  
Op Note: N/A  
  
Pacemaker Check: N/A  
  
Consults: GI 2001  
  
DM: No  
  
Cardiac Surgical prep: No  
  
SIGNATURE: Krystal Castellanos RN CHECKED BY:  
DATE of SERVICE: 2011  
TIME of SERVICE: 2:23 PM  
  
  
   
   
                                Anemia of chronic disease 2011  
   
                                Intestinal polyp 2011  
   
                                                      
  
  
Overview:Formatting of this note might be different from the original.  
Distal ileum ulcerated polyp.  
   
   
                                Nonspecific abnormal results of liver function s  
tudy 2006  
   
                                Impotence of organic origin 2006  
   
                                Obesity, Class III, BMI 40-49.9 (morbid obesity)  
                 2021  
  
documented as of this encounter (statuses as of 2023)  
[facility]- History of Past illness Narrative*   
  
                                Problem         Noted Date      Resolved Date  
   
                                Medicare annual wellness visit, subsequent 2019  
   
                                Encounter for long-term (current) use of medicat  
ions 2018  
   
                                                      
  
  
Overview:Formatting of this note might be different from the original.  
Added automatically from request for surgery [id number]  
   
   
                                History of colonic polyps 2018  
   
                                                      
  
  
Overview:Formatting of this note might be different from the original.  
Added automatically from request for surgery [id number]  
   
   
                                Gastroesophageal reflux disease 2018  
   
                                                      
  
  
Overview:Formatting of this note might be different from the original.  
Added automatically from request for surgery [id number]  
   
   
                                Acute pulmonary edema 10/05/2011      10/06/2011  
   
                                                      
  
  
Overview:Formatting of this note might be different from the original.  
10/5/2011  
cardiogenic and noncardiogenic factors  
  
   
   
                                Atelectasis     10/05/2011      10/06/2011  
   
                                Hypotension     10/04/2011      10/06/2011  
   
                                                      
  
  
Overview:Formatting of this note might be different from the original.  
10/4/2011  
goal map 65-80  
   
   
                                Stress hyperglycemia 10/04/2011      10/06/2011  
   
                                                      
  
  
Overview:Formatting of this note might be different from the original.  
10/4/2011  
Perioperative insulin resistance and exacerbation of hyperglycemia.  
Control blood glucose with titration of insulin infusion  
  
10/5/2011  
adequate control  
goal FBG<180  
  
   
   
                                Post-op pain    10/04/2011      10/06/2011  
   
                                Mechanically assisted ventilation 10/04/2011      
  10/05/2011  
   
                                                      
  
  
Overview:Formatting of this note might be different from the original.  
10/4/2011  
optimize MV settings, WTE  
current setting:FiO2, 75%, peep 8  
   
   
                                Cardiac insufficiency 10/04/2011      10/05/2011  
   
                                                      
  
  
Overview:Formatting of this note might be different from the original.  
10/4/2011  
RV mild to moderate post pump  
goal CI>2.3  
   
   
                                Hypoxemia       10/04/2011      10/06/2011  
   
                                discharge planning 2011      10/19/2011  
   
                                                      
  
  
Overview:Formatting of this note might be different from the original.  
age 71,  lives in [place], [place]. No DC needs.  
/  
   
   
                                Pre-op testing  2011      10/04/2011  
   
                                                      
  
  
Overview:Formatting of this note is different from the original.  
Images from the original note were not included.  
[facility]  
PRE-OP CHECKLIST  
  
Surgeon: Deangelo Angulo M.D. Informed Consent Completed: No  
STS Score: 1.4%  
CAD: Yes - CAD on Problem List: Yes  
Is intended procedure a CABG: Yes - is a beta blocker ordered? Yes  
  
H & P completed: Yes  
  
PA/LAT: Completed CT: Completed MRI: N/A LE US: N/A  
  
Cath: Yes - reviewed: Yes Echo:Completed EKG: Completed EF %: 61  
  
PI's: N/A Carotid: Completed Mapping: N/A Dental: Completed PFT's: N/A  
  
Basename 11 0729  
WBC 7.18  
HB 13.1  
HCT 41.8  
  
INR 1.0  
CREAT 1.35  
  
UA: Normal  
HCG:N/A  
  
ABO/ABO Confirmed: Yes  
  
Blood ordered: No  
  
SA Swab: Yes - results: Pending  
  
Last Dose of Anticoagulation: vitamins  
  
Op Note: N/A  
  
Pacemaker Check: N/A  
  
Consults: GI 2001  
  
DM: No  
  
Cardiac Surgical prep: No  
  
SIGNATURE: Krystal Castellanos RN CHECKED BY:  
DATE of SERVICE: 2011  
TIME of SERVICE: 2:23 PM  
  
  
   
   
                                Anemia of chronic disease 2011  
   
                                Intestinal polyp 2011  
   
                                                      
  
  
Overview:Formatting of this note might be different from the original.  
Distal ileum ulcerated polyp.  
   
   
                                Nonspecific abnormal results of liver function s  
nialldy 2006  
   
                                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  
   
                                Obesity, Class III, BMI 40-49.9 (morbid obesity)  
                 2021  
  
documented as of this encounter (statuses as of 2023)  
[facility]- History of Past illness Narrative*   
  
                                Problem         Noted Date      Resolved Date  
   
                                Medicare annual wellness visit, subsequent 2019  
   
                                Encounter for long-term (current) use of medicat  
ions 2018  
   
                                                      
  
  
Overview:Formatting of this note might be different from the original.  
Added automatically from request for surgery [id number]  
   
   
                                History of colonic polyps 2018  
   
                                                      
  
  
Overview:Formatting of this note might be different from the original.  
Added automatically from request for surgery [id number]  
   
   
                                Gastroesophageal reflux disease 2018  
   
                                                      
  
  
Overview:Formatting of this note might be different from the original.  
Added automatically from request for surgery [id number]  
   
   
                                Acute pulmonary edema 10/05/2011      10/06/2011  
   
                                                      
  
  
Overview:Formatting of this note might be different from the original.  
10/5/2011  
cardiogenic and noncardiogenic factors  
  
   
   
                                Atelectasis     10/05/2011      10/06/2011  
   
                                Hypotension     10/04/2011      10/06/2011  
   
                                                      
  
  
Overview:Formatting of this note might be different from the original.  
10/4/2011  
goal map 65-80  
   
   
                                Stress hyperglycemia 10/04/2011      10/06/2011  
   
                                                      
  
  
Overview:Formatting of this note might be different from the original.  
10/4/2011  
Perioperative insulin resistance and exacerbation of hyperglycemia.  
Control blood glucose with titration of insulin infusion  
  
10/5/2011  
adequate control  
goal FBG<180  
  
   
   
                                Post-op pain    10/04/2011      10/06/2011  
   
                                Mechanically assisted ventilation 10/04/2011      
  10/05/2011  
   
                                                      
  
  
Overview:Formatting of this note might be different from the original.  
10/4/2011  
optimize MV settings, WTE  
current setting:FiO2, 75%, peep 8  
   
   
                                Cardiac insufficiency 10/04/2011      10/05/2011  
   
                                                      
  
  
Overview:Formatting of this note might be different from the original.  
10/4/2011  
RV mild to moderate post pump  
goal CI>2.3  
   
   
                                Hypoxemia       10/04/2011      10/06/2011  
   
                                discharge planning 2011      10/19/2011  
   
                                                      
  
  
Overview:Formatting of this note might be different from the original.  
age 71,  lives in [place], [place]. No DC needs.  
/  
   
   
                                Pre-op testing  2011      10/04/2011  
   
                                                      
  
  
Overview:Formatting of this note is different from the original.  
Images from the original note were not included.  
[facility]  
PRE-OP CHECKLIST  
  
Surgeon: Deangelo Angulo M.D. Informed Consent Completed: No  
STS Score: 1.4%  
CAD: Yes - CAD on Problem List: Yes  
Is intended procedure a CABG: Yes - is a beta blocker ordered? Yes  
  
H & P completed: Yes  
  
PA/LAT: Completed CT: Completed MRI: N/A LE US: N/A  
  
Cath: Yes - reviewed: Yes Echo:Completed EKG: Completed EF %: 61  
  
PI's: N/A Carotid: Completed Mapping: N/A Dental: Completed PFT's: N/A  
  
Basename 11 0729  
WBC 7.18  
HB 13.1  
HCT 41.8  
  
INR 1.0  
CREAT 1.35  
  
UA: Normal  
HCG:N/A  
  
ABO/ABO Confirmed: Yes  
  
Blood ordered: No  
  
SA Swab: Yes - results: Pending  
  
Last Dose of Anticoagulation: vitamins  
  
Op Note: N/A  
  
Pacemaker Check: N/A  
  
Consults: GI 2001  
  
DM: No  
  
Cardiac Surgical prep: No  
  
SIGNATURE: Krystal Castellanos RN CHECKED BY:  
DATE of SERVICE: 2011  
TIME of SERVICE: 2:23 PM  
  
  
   
   
                                Anemia of chronic disease 2011  
   
                                Intestinal polyp 2011  
   
                                                      
  
  
Overview:Formatting of this note might be different from the original.  
Distal ileum ulcerated polyp.  
   
   
                                Nonspecific abnormal results of liver function s  
nialldy 2006  
   
                                Impotence of organic origin 2006  
   
                                Obesity, Class III, BMI 40-49.9 (morbid obesity)  
                 2021  
  
documented as of this encounter (statuses as of 2023)  
[facility]- History of Past illness Narrative*   
  
                                Problem         Noted Date      Resolved Date  
   
                                Medicare annual wellness visit, subsequent 2019  
   
                                Encounter for long-term (current) use of medicat  
ions 2018  
   
                                                      
  
  
Overview:Formatting of this note might be different from the original.  
Added automatically from request for surgery [id number]  
   
   
                                History of colonic polyps 2018  
   
                                                      
  
  
Overview:Formatting of this note might be different from the original.  
Added automatically from request for surgery [id number]  
   
   
                                Gastroesophageal reflux disease 2018  
   
                                                      
  
  
Overview:Formatting of this note might be different from the original.  
Added automatically from request for surgery [id number]  
   
   
                                Acute pulmonary edema 10/05/2011      10/06/2011  
   
                                                      
  
  
Overview:Formatting of this note might be different from the original.  
10/5/2011  
cardiogenic and noncardiogenic factors  
  
   
   
                                Atelectasis     10/05/2011      10/06/2011  
   
                                Hypotension     10/04/2011      10/06/2011  
   
                                                      
  
  
Overview:Formatting of this note might be different from the original.  
10/4/2011  
goal map 65-80  
   
   
                                Stress hyperglycemia 10/04/2011      10/06/2011  
   
                                                      
  
  
Overview:Formatting of this note might be different from the original.  
10/4/2011  
Perioperative insulin resistance and exacerbation of hyperglycemia.  
Control blood glucose with titration of insulin infusion  
  
10/5/2011  
adequate control  
goal FBG<180  
  
   
   
                                Post-op pain    10/04/2011      10/06/2011  
   
                                Mechanically assisted ventilation 10/04/2011      
  10/05/2011  
   
                                                      
  
  
Overview:Formatting of this note might be different from the original.  
10/4/2011  
optimize MV settings, WTE  
current setting:FiO2, 75%, peep 8  
   
   
                                Cardiac insufficiency 10/04/2011      10/05/2011  
   
                                                      
  
  
Overview:Formatting of this note might be different from the original.  
10/4/2011  
RV mild to moderate post pump  
goal CI>2.3  
   
   
                                Hypoxemia       10/04/2011      10/06/2011  
   
                                discharge planning 2011      10/19/2011  
   
                                                      
  
  
Overview:Formatting of this note might be different from the original.  
age 71,  lives in [place], [place]. No DC needs.  
/  
   
   
                                Pre-op testing  2011      10/04/2011  
   
                                                      
  
  
Overview:Formatting of this note is different from the original.  
Images from the original note were not included.  
[facility]  
PRE-OP CHECKLIST  
  
Surgeon: Deangelo Angulo M.D. Informed Consent Completed: No  
STS Score: 1.4%  
CAD: Yes - CAD on Problem List: Yes  
Is intended procedure a CABG: Yes - is a beta blocker ordered? Yes  
  
H & P completed: Yes  
  
PA/LAT: Completed CT: Completed MRI: N/A LE US: N/A  
  
Cath: Yes - reviewed: Yes Echo:Completed EKG: Completed EF %: 61  
  
PI's: N/A Carotid: Completed Mapping: N/A Dental: Completed PFT's: N/A  
  
Basename 11 0729  
WBC 7.18  
HB 13.1  
HCT 41.8  
  
INR 1.0  
CREAT 1.35  
  
UA: Normal  
HCG:N/A  
  
ABO/ABO Confirmed: Yes  
  
Blood ordered: No  
  
SA Swab: Yes - results: Pending  
  
Last Dose of Anticoagulation: vitamins  
  
Op Note: N/A  
  
Pacemaker Check: N/A  
  
Consults: GI 2001  
  
DM: No  
  
Cardiac Surgical prep: No  
  
SIGNATURE: Krystal Castellanos RN CHECKED BY:  
DATE of SERVICE: 2011  
TIME of SERVICE: 2:23 PM  
  
  
   
   
                                Anemia of chronic disease 2011  
   
                                Intestinal polyp 2011  
   
                                                      
  
  
Overview:Formatting of this note might be different from the original.  
Distal ileum ulcerated polyp.  
   
   
                                Nonspecific abnormal results of liver function s  
tudy 2006  
   
                                Impotence of organic origin 2006  
   
                                Obesity, Class III, BMI 40-49.9 (morbid obesity)  
                 2021  
  
documented as of this encounter (statuses as of 2023)  
[facility]06- History of Past illness Narrative*   
  
                                Problem         Noted Date      Resolved Date  
   
                                Medicare annual wellness visit, subsequent 2019  
   
                                Encounter for long-term (current) use of medicat  
ions 2018  
   
                                                      
  
  
Overview:Formatting of this note might be different from the original.  
Added automatically from request for surgery [id number]  
   
   
                                History of colonic polyps 2018  
   
                                                      
  
  
Overview:Formatting of this note might be different from the original.  
Added automatically from request for surgery [id number]  
   
   
                                Gastroesophageal reflux disease 2018  
   
                                                      
  
  
Overview:Formatting of this note might be different from the original.  
Added automatically from request for surgery [id number]  
   
   
                                Acute pulmonary edema 10/05/2011      10/06/2011  
   
                                                      
  
  
Overview:Formatting of this note might be different from the original.  
10/5/2011  
cardiogenic and noncardiogenic factors  
  
   
   
                                Atelectasis     10/05/2011      10/06/2011  
   
                                Hypotension     10/04/2011      10/06/2011  
   
                                                      
  
  
Overview:Formatting of this note might be different from the original.  
10/4/2011  
goal map 65-80  
   
   
                                Stress hyperglycemia 10/04/2011      10/06/2011  
   
                                                      
  
  
Overview:Formatting of this note might be different from the original.  
10/4/2011  
Perioperative insulin resistance and exacerbation of hyperglycemia.  
Control blood glucose with titration of insulin infusion  
  
10/5/2011  
adequate control  
goal FBG<180  
  
   
   
                                Post-op pain    10/04/2011      10/06/2011  
   
                                Mechanically assisted ventilation 10/04/2011      
  10/05/2011  
   
                                                      
  
  
Overview:Formatting of this note might be different from the original.  
10/4/2011  
optimize MV settings, WTE  
current setting:FiO2, 75%, peep 8  
   
   
                                Cardiac insufficiency 10/04/2011      10/05/2011  
   
                                                      
  
  
Overview:Formatting of this note might be different from the original.  
10/4/2011  
RV mild to moderate post pump  
goal CI>2.3  
   
   
                                Hypoxemia       10/04/2011      10/06/2011  
   
                                discharge planning 2011      10/19/2011  
   
                                                      
  
  
Overview:Formatting of this note might be different from the original.  
age 71,  lives in [place], [place]. No DC needs.  
/  
   
   
                                Pre-op testing  2011      10/04/2011  
   
                                                      
  
  
Overview:Formatting of this note is different from the original.  
Images from the original note were not included.  
[facility]  
PRE-OP CHECKLIST  
  
Surgeon: Deangelo Angulo M.D. Informed Consent Completed: No  
STS Score: 1.4%  
CAD: Yes - CAD on Problem List: Yes  
Is intended procedure a CABG: Yes - is a beta blocker ordered? Yes  
  
H & P completed: Yes  
  
PA/LAT: Completed CT: Completed MRI: N/A LE US: N/A  
  
Cath: Yes - reviewed: Yes Echo:Completed EKG: Completed EF %: 61  
  
PI's: N/A Carotid: Completed Mapping: N/A Dental: Completed PFT's: N/A  
  
Basename 11 0729  
WBC 7.18  
HB 13.1  
HCT 41.8  
  
INR 1.0  
CREAT 1.35  
  
UA: Normal  
HCG:N/A  
  
ABO/ABO Confirmed: Yes  
  
Blood ordered: No  
  
SA Swab: Yes - results: Pending  
  
Last Dose of Anticoagulation: vitamins  
  
Op Note: N/A  
  
Pacemaker Check: N/A  
  
Consults: GI 2001  
  
DM: No  
  
Cardiac Surgical prep: No  
  
SIGNATURE: Krystal Castellanos RN CHECKED BY:  
DATE of SERVICE: 2011  
TIME of SERVICE: 2:23 PM  
  
  
   
   
                                Anemia of chronic disease 2011  
   
                                Intestinal polyp 2011  
   
                                                      
  
  
Overview:Formatting of this note might be different from the original.  
Distal ileum ulcerated polyp.  
   
   
                                Nonspecific abnormal results of liver function s  
tudy 2006  
   
                                Impotence of organic origin 2006  
   
                                Obesity, Class III, BMI 40-49.9 (morbid obesity)  
                 2021  
  
documented as of this encounter (statuses as of 2023)  
[facility]06- History of Past illness Narrative*   
  
                                Problem         Noted Date      Resolved Date  
   
                                Medicare annual wellness visit, subsequent 2019  
   
                                Encounter for long-term (current) use of medicat  
ions 2018  
   
                                                      
  
  
Overview:Formatting of this note might be different from the original.  
Added automatically from request for surgery [id number]  
   
   
                                History of colonic polyps 2018  
   
                                                      
  
  
Overview:Formatting of this note might be different from the original.  
Added automatically from request for surgery [id number]  
   
   
                                Gastroesophageal reflux disease 2018  
   
                                                      
  
  
Overview:Formatting of this note might be different from the original.  
Added automatically from request for surgery [id number]  
   
   
                                Acute pulmonary edema 10/05/2011      10/06/2011  
   
                                                      
  
  
Overview:Formatting of this note might be different from the original.  
10/5/2011  
cardiogenic and noncardiogenic factors  
  
   
   
                                Atelectasis     10/05/2011      10/06/2011  
   
                                Hypotension     10/04/2011      10/06/2011  
   
                                                      
  
  
Overview:Formatting of this note might be different from the original.  
10/4/2011  
goal map 65-80  
   
   
                                Stress hyperglycemia 10/04/2011      10/06/2011  
   
                                                      
  
  
Overview:Formatting of this note might be different from the original.  
10/4/2011  
Perioperative insulin resistance and exacerbation of hyperglycemia.  
Control blood glucose with titration of insulin infusion  
  
10/5/2011  
adequate control  
goal FBG<180  
  
   
   
                                Post-op pain    10/04/2011      10/06/2011  
   
                                Mechanically assisted ventilation 10/04/2011      
  10/05/2011  
   
                                                      
  
  
Overview:Formatting of this note might be different from the original.  
10/4/2011  
optimize MV settings, WTE  
current setting:FiO2, 75%, peep 8  
   
   
                                Cardiac insufficiency 10/04/2011      10/05/2011  
   
                                                      
  
  
Overview:Formatting of this note might be different from the original.  
10/4/2011  
RV mild to moderate post pump  
goal CI>2.3  
   
   
                                Hypoxemia       10/04/2011      10/06/2011  
   
                                discharge planning 2011      10/19/2011  
   
                                                      
  
  
Overview:Formatting of this note might be different from the original.  
age 71,  lives in [place], [place]. No DC needs.  
/  
   
   
                                Pre-op testing  2011      10/04/2011  
   
                                                      
  
  
Overview:Formatting of this note is different from the original.  
Images from the original note were not included.  
[facility]  
PRE-OP CHECKLIST  
  
Surgeon: Deangelo Angulo M.D. Informed Consent Completed: No  
STS Score: 1.4%  
CAD: Yes - CAD on Problem List: Yes  
Is intended procedure a CABG: Yes - is a beta blocker ordered? Yes  
  
H & P completed: Yes  
  
PA/LAT: Completed CT: Completed MRI: N/A LE US: N/A  
  
Cath: Yes - reviewed: Yes Echo:Completed EKG: Completed EF %: 61  
  
PI's: N/A Carotid: Completed Mapping: N/A Dental: Completed PFT's: N/A  
  
Basename 11 0729  
WBC 7.18  
HB 13.1  
HCT 41.8  
  
INR 1.0  
CREAT 1.35  
  
UA: Normal  
HCG:N/A  
  
ABO/ABO Confirmed: Yes  
  
Blood ordered: No  
  
SA Swab: Yes - results: Pending  
  
Last Dose of Anticoagulation: vitamins  
  
Op Note: N/A  
  
Pacemaker Check: N/A  
  
Consults: GI 2001  
  
DM: No  
  
Cardiac Surgical prep: No  
  
SIGNATURE: Krystal Castellanos RN CHECKED BY:  
DATE of SERVICE: 2011  
TIME of SERVICE: 2:23 PM  
  
  
   
   
                                Anemia of chronic disease 2011  
   
                                Intestinal polyp 2011  
   
                                                      
  
  
Overview:Formatting of this note might be different from the original.  
Distal ileum ulcerated polyp.  
   
   
                                Nonspecific abnormal results of liver function s  
nialldy 2006  
   
                                Impotence of organic origin 2006  
   
                                Obesity, Class III, BMI 40-49.9 (morbid obesity)  
                 2021  
  
documented as of this encounter (statuses as of 2023)  
[facility]- History of Past illness Narrative*   
  
                                Problem         Noted Date      Resolved Date  
   
                                Medicare annual wellness visit, subsequent 2019  
   
                                Encounter for long-term (current) use of medicat  
ions 2018  
   
                                                      
  
  
Overview:Formatting of this note might be different from the original.  
Added automatically from request for surgery [id number]  
   
   
                                History of colonic polyps 2018  
   
                                                      
  
  
Overview:Formatting of this note might be different from the original.  
Added automatically from request for surgery [id number]  
   
   
                                Gastroesophageal reflux disease 2018  
   
                                                      
  
  
Overview:Formatting of this note might be different from the original.  
Added automatically from request for surgery [id number]  
   
   
                                Acute pulmonary edema 10/05/2011      10/06/2011  
   
                                                      
  
  
Overview:Formatting of this note might be different from the original.  
10/5/2011  
cardiogenic and noncardiogenic factors  
  
   
   
                                Atelectasis     10/05/2011      10/06/2011  
   
                                Hypotension     10/04/2011      10/06/2011  
   
                                                      
  
  
Overview:Formatting of this note might be different from the original.  
10/4/2011  
goal map 65-80  
   
   
                                Stress hyperglycemia 10/04/2011      10/06/2011  
   
                                                      
  
  
Overview:Formatting of this note might be different from the original.  
10/4/2011  
Perioperative insulin resistance and exacerbation of hyperglycemia.  
Control blood glucose with titration of insulin infusion  
  
10/5/2011  
adequate control  
goal FBG<180  
  
   
   
                                Post-op pain    10/04/2011      10/06/2011  
   
                                Mechanically assisted ventilation 10/04/2011      
  10/05/2011  
   
                                                      
  
  
Overview:Formatting of this note might be different from the original.  
10/4/2011  
optimize MV settings, WTE  
current setting:FiO2, 75%, peep 8  
   
   
                                Cardiac insufficiency 10/04/2011      10/05/2011  
   
                                                      
  
  
Overview:Formatting of this note might be different from the original.  
10/4/2011  
RV mild to moderate post pump  
goal CI>2.3  
   
   
                                Hypoxemia       10/04/2011      10/06/2011  
   
                                discharge planning 2011      10/19/2011  
   
                                                      
  
  
Overview:Formatting of this note might be different from the original.  
age 71,  lives in [place], [place]. No DC needs.  
/  
   
   
                                Pre-op testing  2011      10/04/2011  
   
                                                      
  
  
Overview:Formatting of this note is different from the original.  
Images from the original note were not included.  
[facility]  
PRE-OP CHECKLIST  
  
Surgeon: Deangelo Angulo M.D. Informed Consent Completed: No  
STS Score: 1.4%  
CAD: Yes - CAD on Problem List: Yes  
Is intended procedure a CABG: Yes - is a beta blocker ordered? Yes  
  
H & P completed: Yes  
  
PA/LAT: Completed CT: Completed MRI: N/A LE US: N/A  
  
Cath: Yes - reviewed: Yes Echo:Completed EKG: Completed EF %: 61  
  
PI's: N/A Carotid: Completed Mapping: N/A Dental: Completed PFT's: N/A  
  
Basename 11 0729  
WBC 7.18  
HB 13.1  
HCT 41.8  
  
INR 1.0  
CREAT 1.35  
  
UA: Normal  
HCG:N/A  
  
ABO/ABO Confirmed: Yes  
  
Blood ordered: No  
  
SA Swab: Yes - results: Pending  
  
Last Dose of Anticoagulation: vitamins  
  
Op Note: N/A  
  
Pacemaker Check: N/A  
  
Consults: GI 2001  
  
DM: No  
  
Cardiac Surgical prep: No  
  
SIGNATURE: Krystal Castellanos RN CHECKED BY:  
DATE of SERVICE: 2011  
TIME of SERVICE: 2:23 PM  
  
  
   
   
                                Anemia of chronic disease 2011  
   
                                Intestinal polyp 2011  
   
                                                      
  
  
Overview:Formatting of this note might be different from the original.  
Distal ileum ulcerated polyp.  
   
   
                                Nonspecific abnormal results of liver function s  
tudy 2006  
   
                                Impotence of organic origin 2006  
   
                                Obesity, Class III, BMI 40-49.9 (morbid obesity)  
                 2021  
  
documented as of this encounter (statuses as of 2023)  
[facility]- History of Past illness Narrative*   
  
                                Problem         Noted Date      Resolved Date  
   
                                Medicare annual wellness visit, subsequent 2019  
   
                                Encounter for long-term (current) use of medicat  
ions 2018  
   
                                                      
  
  
Overview:Formatting of this note might be different from the original.  
Added automatically from request for surgery [id number]  
   
   
                                History of colonic polyps 2018  
   
                                                      
  
  
Overview:Formatting of this note might be different from the original.  
Added automatically from request for surgery [id number]  
   
   
                                Gastroesophageal reflux disease 2018  
   
                                                      
  
  
Overview:Formatting of this note might be different from the original.  
Added automatically from request for surgery [id number]  
   
   
                                Acute pulmonary edema 10/05/2011      10/06/2011  
   
                                                      
  
  
Overview:Formatting of this note might be different from the original.  
10/5/2011  
cardiogenic and noncardiogenic factors  
  
   
   
                                Atelectasis     10/05/2011      10/06/2011  
   
                                Hypotension     10/04/2011      10/06/2011  
   
                                                      
  
  
Overview:Formatting of this note might be different from the original.  
10/4/2011  
goal map 65-80  
   
   
                                Stress hyperglycemia 10/04/2011      10/06/2011  
   
                                                      
  
  
Overview:Formatting of this note might be different from the original.  
10/4/2011  
Perioperative insulin resistance and exacerbation of hyperglycemia.  
Control blood glucose with titration of insulin infusion  
  
10/5/2011  
adequate control  
goal FBG<180  
  
   
   
                                Post-op pain    10/04/2011      10/06/2011  
   
                                Mechanically assisted ventilation 10/04/2011      
  10/05/2011  
   
                                                      
  
  
Overview:Formatting of this note might be different from the original.  
10/4/2011  
optimize MV settings, WTE  
current setting:FiO2, 75%, peep 8  
   
   
                                Cardiac insufficiency 10/04/2011      10/05/2011  
   
                                                      
  
  
Overview:Formatting of this note might be different from the original.  
10/4/2011  
RV mild to moderate post pump  
goal CI>2.3  
   
   
                                Hypoxemia       10/04/2011      10/06/2011  
   
                                discharge planning 2011      10/19/2011  
   
                                                      
  
  
Overview:Formatting of this note might be different from the original.  
age 71,  lives in [place], [place]. No DC needs.  
/  
   
   
                                Pre-op testing  2011      10/04/2011  
   
                                                      
  
  
Overview:Formatting of this note is different from the original.  
Images from the original note were not included.  
[facility]  
PRE-OP CHECKLIST  
  
Surgeon: Deangelo Angulo M.D. Informed Consent Completed: No  
STS Score: 1.4%  
CAD: Yes - CAD on Problem List: Yes  
Is intended procedure a CABG: Yes - is a beta blocker ordered? Yes  
  
H & P completed: Yes  
  
PA/LAT: Completed CT: Completed MRI: N/A LE US: N/A  
  
Cath: Yes - reviewed: Yes Echo:Completed EKG: Completed EF %: 61  
  
PI's: N/A Carotid: Completed Mapping: N/A Dental: Completed PFT's: N/A  
  
Basename 11 0729  
WBC 7.18  
HB 13.1  
HCT 41.8  
  
INR 1.0  
CREAT 1.35  
  
UA: Normal  
HCG:N/A  
  
ABO/ABO Confirmed: Yes  
  
Blood ordered: No  
  
SA Swab: Yes - results: Pending  
  
Last Dose of Anticoagulation: vitamins  
  
Op Note: N/A  
  
Pacemaker Check: N/A  
  
Consults: GI 2001  
  
DM: No  
  
Cardiac Surgical prep: No  
  
SIGNATURE: Krystal Castellanos RN CHECKED BY:  
DATE of SERVICE: 2011  
TIME of SERVICE: 2:23 PM  
  
  
   
   
                                Anemia of chronic disease 2011  
   
                                Intestinal polyp 2011  
   
                                                      
  
  
Overview:Formatting of this note might be different from the original.  
Distal ileum ulcerated polyp.  
   
   
                                Nonspecific abnormal results of liver function s  
tudy 2006  
   
                                Impotence of organic origin 2006  
   
                                Obesity, Class III, BMI 40-49.9 (morbid obesity)  
                 2021  
  
documented as of this encounter (statuses as of 2023)  
[facility]- History of Past illness Narrative*   
  
                                Problem         Noted Date      Resolved Date  
   
                                Medicare annual wellness visit, subsequent 2019  
   
                                Encounter for long-term (current) use of medicat  
ions 2018  
   
                                                      
  
  
Overview:Formatting of this note might be different from the original.  
Added automatically from request for surgery [id number]  
   
   
                                History of colonic polyps 2018  
   
                                                      
  
  
Overview:Formatting of this note might be different from the original.  
Added automatically from request for surgery [id number]  
   
   
                                Gastroesophageal reflux disease 2018  
   
                                                      
  
  
Overview:Formatting of this note might be different from the original.  
Added automatically from request for surgery [id number]  
   
   
                                Acute pulmonary edema 10/05/2011      10/06/2011  
   
                                                      
  
  
Overview:Formatting of this note might be different from the original.  
10/5/2011  
cardiogenic and noncardiogenic factors  
  
   
   
                                Atelectasis     10/05/2011      10/06/2011  
   
                                Hypotension     10/04/2011      10/06/2011  
   
                                                      
  
  
Overview:Formatting of this note might be different from the original.  
10/4/2011  
goal map 65-80  
   
   
                                Stress hyperglycemia 10/04/2011      10/06/2011  
   
                                                      
  
  
Overview:Formatting of this note might be different from the original.  
10/4/2011  
Perioperative insulin resistance and exacerbation of hyperglycemia.  
Control blood glucose with titration of insulin infusion  
  
10/5/2011  
adequate control  
goal FBG<180  
  
   
   
                                Post-op pain    10/04/2011      10/06/2011  
   
                                Mechanically assisted ventilation 10/04/2011      
  10/05/2011  
   
                                                      
  
  
Overview:Formatting of this note might be different from the original.  
10/4/2011  
optimize MV settings, WTE  
current setting:FiO2, 75%, peep 8  
   
   
                                Cardiac insufficiency 10/04/2011      10/05/2011  
   
                                                      
  
  
Overview:Formatting of this note might be different from the original.  
10/4/2011  
RV mild to moderate post pump  
goal CI>2.3  
   
   
                                Hypoxemia       10/04/2011      10/06/2011  
   
                                discharge planning 2011      10/19/2011  
   
                                                      
  
  
Overview:Formatting of this note might be different from the original.  
age 71,  lives in [place], [place]. No DC needs.  
/  
   
   
                                Pre-op testing  2011      10/04/2011  
   
                                                      
  
  
Overview:Formatting of this note is different from the original.  
Images from the original note were not included.  
[facility]  
PRE-OP CHECKLIST  
  
Surgeon: Deangelo Angulo M.D. Informed Consent Completed: No  
STS Score: 1.4%  
CAD: Yes - CAD on Problem List: Yes  
Is intended procedure a CABG: Yes - is a beta blocker ordered? Yes  
  
H & P completed: Yes  
  
PA/LAT: Completed CT: Completed MRI: N/A LE US: N/A  
  
Cath: Yes - reviewed: Yes Echo:Completed EKG: Completed EF %: 61  
  
PI's: N/A Carotid: Completed Mapping: N/A Dental: Completed PFT's: N/A  
  
Basename 11 0729  
WBC 7.18  
HB 13.1  
HCT 41.8  
  
INR 1.0  
CREAT 1.35  
  
UA: Normal  
HCG:N/A  
  
ABO/ABO Confirmed: Yes  
  
Blood ordered: No  
  
SA Swab: Yes - results: Pending  
  
Last Dose of Anticoagulation: vitamins  
  
Op Note: N/A  
  
Pacemaker Check: N/A  
  
Consults: GI 2001  
  
DM: No  
  
Cardiac Surgical prep: No  
  
SIGNATURE: Krystal Castellanos RN CHECKED BY:  
DATE of SERVICE: 2011  
TIME of SERVICE: 2:23 PM  
  
  
   
   
                                Anemia of chronic disease 2011  
   
                                Intestinal polyp 2011  
   
                                                      
  
  
Overview:Formatting of this note might be different from the original.  
Distal ileum ulcerated polyp.  
   
   
                                Nonspecific abnormal results of liver function s  
tudy 2006  
   
                                Impotence of organic origin 2006  
   
                                Obesity, Class III, BMI 40-49.9 (morbid obesity)  
                 2021  
  
documented as of this encounter (statuses as of 02/10/2023)  
[facility]06- History of Past illness Narrative*   
  
                                Problem         Noted Date      Resolved Date  
   
                                Medicare annual wellness visit, subsequent 2019  
   
                                Encounter for long-term (current) use of medicat  
ions 2018  
   
                                                      
  
  
Overview:Formatting of this note might be different from the original.  
Added automatically from request for surgery [id number]  
   
   
                                History of colonic polyps 2018  
   
                                                      
  
  
Overview:Formatting of this note might be different from the original.  
Added automatically from request for surgery [id number]  
   
   
                                Gastroesophageal reflux disease 2018  
   
                                                      
  
  
Overview:Formatting of this note might be different from the original.  
Added automatically from request for surgery [id number]  
   
   
                                Acute pulmonary edema 10/05/2011      10/06/2011  
   
                                                      
  
  
Overview:Formatting of this note might be different from the original.  
10/5/2011  
cardiogenic and noncardiogenic factors  
  
   
   
                                Atelectasis     10/05/2011      10/06/2011  
   
                                Hypotension     10/04/2011      10/06/2011  
   
                                                      
  
  
Overview:Formatting of this note might be different from the original.  
10/4/2011  
goal map 65-80  
   
   
                                Stress hyperglycemia 10/04/2011      10/06/2011  
   
                                                      
  
  
Overview:Formatting of this note might be different from the original.  
10/4/2011  
Perioperative insulin resistance and exacerbation of hyperglycemia.  
Control blood glucose with titration of insulin infusion  
  
10/5/2011  
adequate control  
goal FBG<180  
  
   
   
                                Post-op pain    10/04/2011      10/06/2011  
   
                                Mechanically assisted ventilation 10/04/2011      
  10/05/2011  
   
                                                      
  
  
Overview:Formatting of this note might be different from the original.  
10/4/2011  
optimize MV settings, WTE  
current setting:FiO2, 75%, peep 8  
   
   
                                Cardiac insufficiency 10/04/2011      10/05/2011  
   
                                                      
  
  
Overview:Formatting of this note might be different from the original.  
10/4/2011  
RV mild to moderate post pump  
goal CI>2.3  
   
   
                                Hypoxemia       10/04/2011      10/06/2011  
   
                                discharge planning 2011      10/19/2011  
   
                                                      
  
  
Overview:Formatting of this note might be different from the original.  
age 71,  lives in [place], [place]. No DC needs.  
/  
   
   
                                Pre-op testing  2011      10/04/2011  
   
                                                      
  
  
Overview:Formatting of this note is different from the original.  
Images from the original note were not included.  
[facility]  
PRE-OP CHECKLIST  
  
Surgeon: Deangelo Angulo M.D. Informed Consent Completed: No  
STS Score: 1.4%  
CAD: Yes - CAD on Problem List: Yes  
Is intended procedure a CABG: Yes - is a beta blocker ordered? Yes  
  
H & P completed: Yes  
  
PA/LAT: Completed CT: Completed MRI: N/A LE US: N/A  
  
Cath: Yes - reviewed: Yes Echo:Completed EKG: Completed EF %: 61  
  
PI's: N/A Carotid: Completed Mapping: N/A Dental: Completed PFT's: N/A  
  
Basename 11 0729  
WBC 7.18  
HB 13.1  
HCT 41.8  
  
INR 1.0  
CREAT 1.35  
  
UA: Normal  
HCG:N/A  
  
ABO/ABO Confirmed: Yes  
  
Blood ordered: No  
  
SA Swab: Yes - results: Pending  
  
Last Dose of Anticoagulation: vitamins  
  
Op Note: N/A  
  
Pacemaker Check: N/A  
  
Consults: GI 2001  
  
DM: No  
  
Cardiac Surgical prep: No  
  
SIGNATURE: Krystal Castellanos RN CHECKED BY:  
DATE of SERVICE: 2011  
TIME of SERVICE: 2:23 PM  
  
  
   
   
                                Anemia of chronic disease 2011  
   
                                Intestinal polyp 2011  
   
                                                      
  
  
Overview:Formatting of this note might be different from the original.  
Distal ileum ulcerated polyp.  
   
   
                                Nonspecific abnormal results of liver function s  
tudy 2006  
   
                                Impotence of organic origin 2006  
   
                                Obesity, Class III, BMI 40-49.9 (morbid obesity)  
                 2021  
  
documented as of this encounter (statuses as of 2023)  
[facility]06- History of Past illness Narrative*   
  
                                Problem         Noted Date      Resolved Date  
   
                                Medicare annual wellness visit, subsequent 2019  
   
                                Encounter for long-term (current) use of medicat  
ions 2018  
   
                                                      
  
  
Overview:Formatting of this note might be different from the original.  
Added automatically from request for surgery [id number]  
   
   
                                History of colonic polyps 2018  
   
                                                      
  
  
Overview:Formatting of this note might be different from the original.  
Added automatically from request for surgery [id number]  
   
   
                                Gastroesophageal reflux disease 2018  
   
                                                      
  
  
Overview:Formatting of this note might be different from the original.  
Added automatically from request for surgery [id number]  
   
   
                                Acute pulmonary edema 10/05/2011      10/06/2011  
   
                                                      
  
  
Overview:Formatting of this note might be different from the original.  
10/5/2011  
cardiogenic and noncardiogenic factors  
  
   
   
                                Atelectasis     10/05/2011      10/06/2011  
   
                                Hypotension     10/04/2011      10/06/2011  
   
                                                      
  
  
Overview:Formatting of this note might be different from the original.  
10/4/2011  
goal map 65-80  
   
   
                                Stress hyperglycemia 10/04/2011      10/06/2011  
   
                                                      
  
  
Overview:Formatting of this note might be different from the original.  
10/4/2011  
Perioperative insulin resistance and exacerbation of hyperglycemia.  
Control blood glucose with titration of insulin infusion  
  
10/5/2011  
adequate control  
goal FBG<180  
  
   
   
                                Post-op pain    10/04/2011      10/06/2011  
   
                                Mechanically assisted ventilation 10/04/2011      
  10/05/2011  
   
                                                      
  
  
Overview:Formatting of this note might be different from the original.  
10/4/2011  
optimize MV settings, WTE  
current setting:FiO2, 75%, peep 8  
   
   
                                Cardiac insufficiency 10/04/2011      10/05/2011  
   
                                                      
  
  
Overview:Formatting of this note might be different from the original.  
10/4/2011  
RV mild to moderate post pump  
goal CI>2.3  
   
   
                                Hypoxemia       10/04/2011      10/06/2011  
   
                                discharge planning 2011      10/19/2011  
   
                                                      
  
  
Overview:Formatting of this note might be different from the original.  
age 71,  lives in [place], [place]. No DC needs.  
/  
   
   
                                Pre-op testing  2011      10/04/2011  
   
                                                      
  
  
Overview:Formatting of this note is different from the original.  
Images from the original note were not included.  
[facility]  
PRE-OP CHECKLIST  
  
Surgeon: Deangelo Angulo M.D. Informed Consent Completed: No  
STS Score: 1.4%  
CAD: Yes - CAD on Problem List: Yes  
Is intended procedure a CABG: Yes - is a beta blocker ordered? Yes  
  
H & P completed: Yes  
  
PA/LAT: Completed CT: Completed MRI: N/A LE US: N/A  
  
Cath: Yes - reviewed: Yes Echo:Completed EKG: Completed EF %: 61  
  
PI's: N/A Carotid: Completed Mapping: N/A Dental: Completed PFT's: N/A  
  
Basename 11 0729  
WBC 7.18  
HB 13.1  
HCT 41.8  
  
INR 1.0  
CREAT 1.35  
  
UA: Normal  
HCG:N/A  
  
ABO/ABO Confirmed: Yes  
  
Blood ordered: No  
  
SA Swab: Yes - results: Pending  
  
Last Dose of Anticoagulation: vitamins  
  
Op Note: N/A  
  
Pacemaker Check: N/A  
  
Consults: GI 2001  
  
DM: No  
  
Cardiac Surgical prep: No  
  
SIGNATURE: Krystal Castellanos RN CHECKED BY:  
DATE of SERVICE: 2011  
TIME of SERVICE: 2:23 PM  
  
  
   
   
                                Anemia of chronic disease 2011  
   
                                Intestinal polyp 2011  
   
                                                      
  
  
Overview:Formatting of this note might be different from the original.  
Distal ileum ulcerated polyp.  
   
   
                                Nonspecific abnormal results of liver function s  
tudy 2006  
   
                                Impotence of organic origin 2006  
   
                                Obesity, Class III, BMI 40-49.9 (morbid obesity)  
                 2021  
  
documented as of this encounter (statuses as of 2023)  
[facility]- History of Past illness Narrative*   
  
                                Problem         Noted Date      Resolved Date  
   
                                Medicare annual wellness visit, subsequent 2019  
   
                                Encounter for long-term (current) use of medicat  
ions 2018  
   
                                                      
  
  
Overview:Formatting of this note might be different from the original.  
Added automatically from request for surgery [id number]  
   
   
                                History of colonic polyps 2018  
   
                                                      
  
  
Overview:Formatting of this note might be different from the original.  
Added automatically from request for surgery [id number]  
   
   
                                Gastroesophageal reflux disease 2018  
   
                                                      
  
  
Overview:Formatting of this note might be different from the original.  
Added automatically from request for surgery [id number]  
   
   
                                Acute pulmonary edema 10/05/2011      10/06/2011  
   
                                                      
  
  
Overview:Formatting of this note might be different from the original.  
10/5/2011  
cardiogenic and noncardiogenic factors  
  
   
   
                                Atelectasis     10/05/2011      10/06/2011  
   
                                Hypotension     10/04/2011      10/06/2011  
   
                                                      
  
  
Overview:Formatting of this note might be different from the original.  
10/4/2011  
goal map 65-80  
   
   
                                Stress hyperglycemia 10/04/2011      10/06/2011  
   
                                                      
  
  
Overview:Formatting of this note might be different from the original.  
10/4/2011  
Perioperative insulin resistance and exacerbation of hyperglycemia.  
Control blood glucose with titration of insulin infusion  
  
10/5/2011  
adequate control  
goal FBG<180  
  
   
   
                                Post-op pain    10/04/2011      10/06/2011  
   
                                Mechanically assisted ventilation 10/04/2011      
  10/05/2011  
   
                                                      
  
  
Overview:Formatting of this note might be different from the original.  
10/4/2011  
optimize MV settings, WTE  
current setting:FiO2, 75%, peep 8  
   
   
                                Cardiac insufficiency 10/04/2011      10/05/2011  
   
                                                      
  
  
Overview:Formatting of this note might be different from the original.  
10/4/2011  
RV mild to moderate post pump  
goal CI>2.3  
   
   
                                Hypoxemia       10/04/2011      10/06/2011  
   
                                discharge planning 2011      10/19/2011  
   
                                                      
  
  
Overview:Formatting of this note might be different from the original.  
age 71,  lives in [place], [place]. No DC needs.  
/  
   
   
                                Pre-op testing  2011      10/04/2011  
   
                                                      
  
  
Overview:Formatting of this note is different from the original.  
Images from the original note were not included.  
[facility]  
PRE-OP CHECKLIST  
  
Surgeon: Deangelo Angulo M.D. Informed Consent Completed: No  
STS Score: 1.4%  
CAD: Yes - CAD on Problem List: Yes  
Is intended procedure a CABG: Yes - is a beta blocker ordered? Yes  
  
H & P completed: Yes  
  
PA/LAT: Completed CT: Completed MRI: N/A LE US: N/A  
  
Cath: Yes - reviewed: Yes Echo:Completed EKG: Completed EF %: 61  
  
PI's: N/A Carotid: Completed Mapping: N/A Dental: Completed PFT's: N/A  
  
Basename 11 0729  
WBC 7.18  
HB 13.1  
HCT 41.8  
  
INR 1.0  
CREAT 1.35  
  
UA: Normal  
HCG:N/A  
  
ABO/ABO Confirmed: Yes  
  
Blood ordered: No  
  
SA Swab: Yes - results: Pending  
  
Last Dose of Anticoagulation: vitamins  
  
Op Note: N/A  
  
Pacemaker Check: N/A  
  
Consults: GI 2001  
  
DM: No  
  
Cardiac Surgical prep: No  
  
SIGNATURE: Krystal Castellanos RN CHECKED BY:  
DATE of SERVICE: 2011  
TIME of SERVICE: 2:23 PM  
  
  
   
   
                                Anemia of chronic disease 2011  
   
                                Intestinal polyp 2011  
   
                                                      
  
  
Overview:Formatting of this note might be different from the original.  
Distal ileum ulcerated polyp.  
   
   
                                Nonspecific abnormal results of liver function s  
tudy 2006  
   
                                Impotence of organic origin 2006  
   
                                Obesity, Class III, BMI 40-49.9 (morbid obesity)  
                 2021  
  
documented as of this encounter (statuses as of 2023)  
[facility]- History of Past illness Narrative*   
  
                                Problem         Noted Date      Resolved Date  
   
                                Medicare annual wellness visit, subsequent 2019  
   
                                Encounter for long-term (current) use of medicat  
ions 2018  
   
                                                      
  
  
Overview:Formatting of this note might be different from the original.  
Added automatically from request for surgery [id number]  
   
   
                                History of colonic polyps 2018  
   
                                                      
  
  
Overview:Formatting of this note might be different from the original.  
Added automatically from request for surgery [id number]  
   
   
                                Gastroesophageal reflux disease 2018  
   
                                                      
  
  
Overview:Formatting of this note might be different from the original.  
Added automatically from request for surgery [id number]  
   
   
                                Acute pulmonary edema 10/05/2011      10/06/2011  
   
                                                      
  
  
Overview:Formatting of this note might be different from the original.  
10/5/2011  
cardiogenic and noncardiogenic factors  
  
   
   
                                Atelectasis     10/05/2011      10/06/2011  
   
                                Hypotension     10/04/2011      10/06/2011  
   
                                                      
  
  
Overview:Formatting of this note might be different from the original.  
10/4/2011  
goal map 65-80  
   
   
                                Stress hyperglycemia 10/04/2011      10/06/2011  
   
                                                      
  
  
Overview:Formatting of this note might be different from the original.  
10/4/2011  
Perioperative insulin resistance and exacerbation of hyperglycemia.  
Control blood glucose with titration of insulin infusion  
  
10/5/2011  
adequate control  
goal FBG<180  
  
   
   
                                Post-op pain    10/04/2011      10/06/2011  
   
                                Mechanically assisted ventilation 10/04/2011      
  10/05/2011  
   
                                                      
  
  
Overview:Formatting of this note might be different from the original.  
10/4/2011  
optimize MV settings, WTE  
current setting:FiO2, 75%, peep 8  
   
   
                                Cardiac insufficiency 10/04/2011      10/05/2011  
   
                                                      
  
  
Overview:Formatting of this note might be different from the original.  
10/4/2011  
RV mild to moderate post pump  
goal CI>2.3  
   
   
                                Hypoxemia       10/04/2011      10/06/2011  
   
                                discharge planning 2011      10/19/2011  
   
                                                      
  
  
Overview:Formatting of this note might be different from the original.  
age 71,  lives in [place], [place]. No DC needs.  
/  
   
   
                                Pre-op testing  2011      10/04/2011  
   
                                                      
  
  
Overview:Formatting of this note is different from the original.  
Images from the original note were not included.  
[facility]  
PRE-OP CHECKLIST  
  
Surgeon: Deangelo Angulo M.D. Informed Consent Completed: No  
STS Score: 1.4%  
CAD: Yes - CAD on Problem List: Yes  
Is intended procedure a CABG: Yes - is a beta blocker ordered? Yes  
  
H & P completed: Yes  
  
PA/LAT: Completed CT: Completed MRI: N/A LE US: N/A  
  
Cath: Yes - reviewed: Yes Echo:Completed EKG: Completed EF %: 61  
  
PI's: N/A Carotid: Completed Mapping: N/A Dental: Completed PFT's: N/A  
  
Basename 11 0729  
WBC 7.18  
HB 13.1  
HCT 41.8  
  
INR 1.0  
CREAT 1.35  
  
UA: Normal  
HCG:N/A  
  
ABO/ABO Confirmed: Yes  
  
Blood ordered: No  
  
SA Swab: Yes - results: Pending  
  
Last Dose of Anticoagulation: vitamins  
  
Op Note: N/A  
  
Pacemaker Check: N/A  
  
Consults: GI 2001  
  
DM: No  
  
Cardiac Surgical prep: No  
  
SIGNATURE: Krystal Castellanos RN CHECKED BY:  
DATE of SERVICE: 2011  
TIME of SERVICE: 2:23 PM  
  
  
   
   
                                Anemia of chronic disease 2011  
   
                                Intestinal polyp 2011  
   
                                                      
  
  
Overview:Formatting of this note might be different from the original.  
Distal ileum ulcerated polyp.  
   
   
                                Nonspecific abnormal results of liver function s  
pepe 2006  
   
                                Impotence of organic origin 2006      09/0  
2015  
   
                                Obesity, Class III, BMI 40-49.9 (morbid obesity)  
                 2021  
  
documented as of this encounter (statuses as of 2023)  
[facility]- History of Past illness Narrative*   
  
                                Problem         Noted Date      Resolved Date  
   
                                Medicare annual wellness visit, subsequent 2019  
   
                                Encounter for long-term (current) use of medicat  
ions 2018  
   
                                                      
  
  
Overview:Formatting of this note might be different from the original.  
Added automatically from request for surgery [id number]  
   
   
                                History of colonic polyps 2018  
   
                                                      
  
  
Overview:Formatting of this note might be different from the original.  
Added automatically from request for surgery [id number]  
   
   
                                Gastroesophageal reflux disease 2018  
   
                                                      
  
  
Overview:Formatting of this note might be different from the original.  
Added automatically from request for surgery [id number]  
   
   
                                Acute pulmonary edema 10/05/2011      10/06/2011  
   
                                                      
  
  
Overview:Formatting of this note might be different from the original.  
10/5/2011  
cardiogenic and noncardiogenic factors  
  
   
   
                                Atelectasis     10/05/2011      10/06/2011  
   
                                Hypotension     10/04/2011      10/06/2011  
   
                                                      
  
  
Overview:Formatting of this note might be different from the original.  
10/4/2011  
goal map 65-80  
   
   
                                Stress hyperglycemia 10/04/2011      10/06/2011  
   
                                                      
  
  
Overview:Formatting of this note might be different from the original.  
10/4/2011  
Perioperative insulin resistance and exacerbation of hyperglycemia.  
Control blood glucose with titration of insulin infusion  
  
10/5/2011  
adequate control  
goal FBG<180  
  
   
   
                                Post-op pain    10/04/2011      10/06/2011  
   
                                Mechanically assisted ventilation 10/04/2011      
  10/05/2011  
   
                                                      
  
  
Overview:Formatting of this note might be different from the original.  
10/4/2011  
optimize MV settings, WTE  
current setting:FiO2, 75%, peep 8  
   
   
                                Cardiac insufficiency 10/04/2011      10/05/2011  
   
                                                      
  
  
Overview:Formatting of this note might be different from the original.  
10/4/2011  
RV mild to moderate post pump  
goal CI>2.3  
   
   
                                Hypoxemia       10/04/2011      10/06/2011  
   
                                discharge planning 2011      10/19/2011  
   
                                                      
  
  
Overview:Formatting of this note might be different from the original.  
age 71,  lives in [place], [place]. No DC needs.  
/  
   
   
                                Pre-op testing  2011      10/04/2011  
   
                                                      
  
  
Overview:Formatting of this note is different from the original.  
Images from the original note were not included.  
[facility]  
PRE-OP CHECKLIST  
  
Surgeon: Deangelo Angulo M.D. Informed Consent Completed: No  
STS Score: 1.4%  
CAD: Yes - CAD on Problem List: Yes  
Is intended procedure a CABG: Yes - is a beta blocker ordered? Yes  
  
H & P completed: Yes  
  
PA/LAT: Completed CT: Completed MRI: N/A LE US: N/A  
  
Cath: Yes - reviewed: Yes Echo:Completed EKG: Completed EF %: 61  
  
PI's: N/A Carotid: Completed Mapping: N/A Dental: Completed PFT's: N/A  
  
Basename 11 0729  
WBC 7.18  
HB 13.1  
HCT 41.8  
  
INR 1.0  
CREAT 1.35  
  
UA: Normal  
HCG:N/A  
  
ABO/ABO Confirmed: Yes  
  
Blood ordered: No  
  
SA Swab: Yes - results: Pending  
  
Last Dose of Anticoagulation: vitamins  
  
Op Note: N/A  
  
Pacemaker Check: N/A  
  
Consults: GI 2001  
  
DM: No  
  
Cardiac Surgical prep: No  
  
SIGNATURE: Krystal Castellanos RN CHECKED BY:  
DATE of SERVICE: 2011  
TIME of SERVICE: 2:23 PM  
  
  
   
   
                                Anemia of chronic disease 2011  
   
                                Intestinal polyp 2011  
   
                                                      
  
  
Overview:Formatting of this note might be different from the original.  
Distal ileum ulcerated polyp.  
   
   
                                Nonspecific abnormal results of liver function s  
pepe 2006  
   
                                Impotence of organic origin 2006      09/0  
2015  
   
                                Obesity, Class III, BMI 40-49.9 (morbid obesity)  
                 2021  
  
documented as of this encounter (statuses as of 2023)  
[facility]06- History of Past illness Narrative*   
  
                                Problem         Noted Date      Resolved Date  
   
                                Medicare annual wellness visit, subsequent 2019  
   
                                Encounter for long-term (current) use of medicat  
ions 2018  
   
                                                      
  
  
Overview:Formatting of this note might be different from the original.  
Added automatically from request for surgery [id number]  
   
   
                                History of colonic polyps 2018  
   
                                                      
  
  
Overview:Formatting of this note might be different from the original.  
Added automatically from request for surgery [id number]  
   
   
                                Gastroesophageal reflux disease 2018  
   
                                                      
  
  
Overview:Formatting of this note might be different from the original.  
Added automatically from request for surgery [id number]  
   
   
                                Acute pulmonary edema 10/05/2011      10/06/2011  
   
                                                      
  
  
Overview:Formatting of this note might be different from the original.  
10/5/2011  
cardiogenic and noncardiogenic factors  
  
   
   
                                Atelectasis     10/05/2011      10/06/2011  
   
                                Hypotension     10/04/2011      10/06/2011  
   
                                                      
  
  
Overview:Formatting of this note might be different from the original.  
10/4/2011  
goal map 65-80  
   
   
                                Stress hyperglycemia 10/04/2011      10/06/2011  
   
                                                      
  
  
Overview:Formatting of this note might be different from the original.  
10/4/2011  
Perioperative insulin resistance and exacerbation of hyperglycemia.  
Control blood glucose with titration of insulin infusion  
  
10/5/2011  
adequate control  
goal FBG<180  
  
   
   
                                Post-op pain    10/04/2011      10/06/2011  
   
                                Mechanically assisted ventilation 10/04/2011      
  10/05/2011  
   
                                                      
  
  
Overview:Formatting of this note might be different from the original.  
10/4/2011  
optimize MV settings, WTE  
current setting:FiO2, 75%, peep 8  
   
   
                                Cardiac insufficiency 10/04/2011      10/05/2011  
   
                                                      
  
  
Overview:Formatting of this note might be different from the original.  
10/4/2011  
RV mild to moderate post pump  
goal CI>2.3  
   
   
                                Hypoxemia       10/04/2011      10/06/2011  
   
                                discharge planning 2011      10/19/2011  
   
                                                      
  
  
Overview:Formatting of this note might be different from the original.  
age 71,  lives in [place], [place]. No DC needs.  
/  
   
   
                                Pre-op testing  2011      10/04/2011  
   
                                                      
  
  
Overview:Formatting of this note is different from the original.  
Images from the original note were not included.  
[facility]  
PRE-OP CHECKLIST  
  
Surgeon: Deangelo Angulo M.D. Informed Consent Completed: No  
STS Score: 1.4%  
CAD: Yes - CAD on Problem List: Yes  
Is intended procedure a CABG: Yes - is a beta blocker ordered? Yes  
  
H & P completed: Yes  
  
PA/LAT: Completed CT: Completed MRI: N/A LE US: N/A  
  
Cath: Yes - reviewed: Yes Echo:Completed EKG: Completed EF %: 61  
  
PI's: N/A Carotid: Completed Mapping: N/A Dental: Completed PFT's: N/A  
  
Basename 11 0729  
WBC 7.18  
HB 13.1  
HCT 41.8  
  
INR 1.0  
CREAT 1.35  
  
UA: Normal  
HCG:N/A  
  
ABO/ABO Confirmed: Yes  
  
Blood ordered: No  
  
SA Swab: Yes - results: Pending  
  
Last Dose of Anticoagulation: vitamins  
  
Op Note: N/A  
  
Pacemaker Check: N/A  
  
Consults: GI 2001  
  
DM: No  
  
Cardiac Surgical prep: No  
  
SIGNATURE: Krystal Castellanos RN CHECKED BY:  
DATE of SERVICE: 2011  
TIME of SERVICE: 2:23 PM  
  
  
   
   
                                Anemia of chronic disease 2011  
   
                                Intestinal polyp 2011  
   
                                                      
  
  
Overview:Formatting of this note might be different from the original.  
Distal ileum ulcerated polyp.  
   
   
                                Nonspecific abnormal results of liver function s  
tudy 2006  
   
                                Impotence of organic origin 2006  
   
                                Obesity, Class III, BMI 40-49.9 (morbid obesity)  
                 2021  
  
documented as of this encounter (statuses as of 03/10/2023)  
[facility]06- History of Past illness Narrative*   
  
                                Problem         Noted Date      Resolved Date  
   
                                Medicare annual wellness visit, subsequent 2019  
   
                                Encounter for long-term (current) use of medicat  
ions 2018  
   
                                                      
  
  
Overview:Formatting of this note might be different from the original.  
Added automatically from request for surgery [id number]  
   
   
                                History of colonic polyps 2018  
   
                                                      
  
  
Overview:Formatting of this note might be different from the original.  
Added automatically from request for surgery [id number]  
   
   
                                Gastroesophageal reflux disease 2018  
   
                                                      
  
  
Overview:Formatting of this note might be different from the original.  
Added automatically from request for surgery [id number]  
   
   
                                Acute pulmonary edema 10/05/2011      10/06/2011  
   
                                                      
  
  
Overview:Formatting of this note might be different from the original.  
10/5/2011  
cardiogenic and noncardiogenic factors  
  
   
   
                                Atelectasis     10/05/2011      10/06/2011  
   
                                Hypotension     10/04/2011      10/06/2011  
   
                                                      
  
  
Overview:Formatting of this note might be different from the original.  
10/4/2011  
goal map 65-80  
   
   
                                Stress hyperglycemia 10/04/2011      10/06/2011  
   
                                                      
  
  
Overview:Formatting of this note might be different from the original.  
10/4/2011  
Perioperative insulin resistance and exacerbation of hyperglycemia.  
Control blood glucose with titration of insulin infusion  
  
10/5/2011  
adequate control  
goal FBG<180  
  
   
   
                                Post-op pain    10/04/2011      10/06/2011  
   
                                Mechanically assisted ventilation 10/04/2011      
  10/05/2011  
   
                                                      
  
  
Overview:Formatting of this note might be different from the original.  
10/4/2011  
optimize MV settings, WTE  
current setting:FiO2, 75%, peep 8  
   
   
                                Cardiac insufficiency 10/04/2011      10/05/2011  
   
                                                      
  
  
Overview:Formatting of this note might be different from the original.  
10/4/2011  
RV mild to moderate post pump  
goal CI>2.3  
   
   
                                Hypoxemia       10/04/2011      10/06/2011  
   
                                discharge planning 2011      10/19/2011  
   
                                                      
  
  
Overview:Formatting of this note might be different from the original.  
age 71,  lives in [place], [place]. No DC needs.  
/  
   
   
                                Pre-op testing  2011      10/04/2011  
   
                                                      
  
  
Overview:Formatting of this note is different from the original.  
Images from the original note were not included.  
[facility]  
PRE-OP CHECKLIST  
  
Surgeon: Deangelo Angulo M.D. Informed Consent Completed: No  
STS Score: 1.4%  
CAD: Yes - CAD on Problem List: Yes  
Is intended procedure a CABG: Yes - is a beta blocker ordered? Yes  
  
H & P completed: Yes  
  
PA/LAT: Completed CT: Completed MRI: N/A LE US: N/A  
  
Cath: Yes - reviewed: Yes Echo:Completed EKG: Completed EF %: 61  
  
PI's: N/A Carotid: Completed Mapping: N/A Dental: Completed PFT's: N/A  
  
Basename 11 0729  
WBC 7.18  
HB 13.1  
HCT 41.8  
  
INR 1.0  
CREAT 1.35  
  
UA: Normal  
HCG:N/A  
  
ABO/ABO Confirmed: Yes  
  
Blood ordered: No  
  
SA Swab: Yes - results: Pending  
  
Last Dose of Anticoagulation: vitamins  
  
Op Note: N/A  
  
Pacemaker Check: N/A  
  
Consults: GI 2001  
  
DM: No  
  
Cardiac Surgical prep: No  
  
SIGNATURE: Krystal Castellanos RN CHECKED BY:  
DATE of SERVICE: 2011  
TIME of SERVICE: 2:23 PM  
  
  
   
   
                                Anemia of chronic disease 2011  
   
                                Intestinal polyp 2011  
   
                                                      
  
  
Overview:Formatting of this note might be different from the original.  
Distal ileum ulcerated polyp.  
   
   
                                Nonspecific abnormal results of liver function s  
tudy 2006  
   
                                Impotence of organic origin 2006  
   
                                Obesity, Class III, BMI 40-49.9 (morbid obesity)  
                 2021  
  
documented as of this encounter (statuses as of 2023)  
[facility]- History of Past illness Narrative*   
  
                                Problem         Noted Date      Resolved Date  
   
                                Medicare annual wellness visit, subsequent 2019  
   
                                Encounter for long-term (current) use of medicat  
ions 2018  
   
                                                      
  
  
Overview:Formatting of this note might be different from the original.  
Added automatically from request for surgery [id number]  
   
   
                                History of colonic polyps 2018  
   
                                                      
  
  
Overview:Formatting of this note might be different from the original.  
Added automatically from request for surgery [id number]  
   
   
                                Gastroesophageal reflux disease 2018  
   
                                                      
  
  
Overview:Formatting of this note might be different from the original.  
Added automatically from request for surgery [id number]  
   
   
                                Acute pulmonary edema 10/05/2011      10/06/2011  
   
                                                      
  
  
Overview:Formatting of this note might be different from the original.  
10/5/2011  
cardiogenic and noncardiogenic factors  
  
   
   
                                Atelectasis     10/05/2011      10/06/2011  
   
                                Hypotension     10/04/2011      10/06/2011  
   
                                                      
  
  
Overview:Formatting of this note might be different from the original.  
10/4/2011  
goal map 65-80  
   
   
                                Stress hyperglycemia 10/04/2011      10/06/2011  
   
                                                      
  
  
Overview:Formatting of this note might be different from the original.  
10/4/2011  
Perioperative insulin resistance and exacerbation of hyperglycemia.  
Control blood glucose with titration of insulin infusion  
  
10/5/2011  
adequate control  
goal FBG<180  
  
   
   
                                Post-op pain    10/04/2011      10/06/2011  
   
                                Mechanically assisted ventilation 10/04/2011      
  10/05/2011  
   
                                                      
  
  
Overview:Formatting of this note might be different from the original.  
10/4/2011  
optimize MV settings, WTE  
current setting:FiO2, 75%, peep 8  
   
   
                                Cardiac insufficiency 10/04/2011      10/05/2011  
   
                                                      
  
  
Overview:Formatting of this note might be different from the original.  
10/4/2011  
RV mild to moderate post pump  
goal CI>2.3  
   
   
                                Hypoxemia       10/04/2011      10/06/2011  
   
                                discharge planning 2011      10/19/2011  
   
                                                      
  
  
Overview:Formatting of this note might be different from the original.  
age 71,  lives in [place], [place]. No DC needs.  
/  
   
   
                                Pre-op testing  2011      10/04/2011  
   
                                                      
  
  
Overview:Formatting of this note is different from the original.  
Images from the original note were not included.  
[facility]  
PRE-OP CHECKLIST  
  
Surgeon: Deangelo Angulo M.D. Informed Consent Completed: No  
STS Score: 1.4%  
CAD: Yes - CAD on Problem List: Yes  
Is intended procedure a CABG: Yes - is a beta blocker ordered? Yes  
  
H & P completed: Yes  
  
PA/LAT: Completed CT: Completed MRI: N/A LE US: N/A  
  
Cath: Yes - reviewed: Yes Echo:Completed EKG: Completed EF %: 61  
  
PI's: N/A Carotid: Completed Mapping: N/A Dental: Completed PFT's: N/A  
  
Basename 11 0729  
WBC 7.18  
HB 13.1  
HCT 41.8  
  
INR 1.0  
CREAT 1.35  
  
UA: Normal  
HCG:N/A  
  
ABO/ABO Confirmed: Yes  
  
Blood ordered: No  
  
SA Swab: Yes - results: Pending  
  
Last Dose of Anticoagulation: vitamins  
  
Op Note: N/A  
  
Pacemaker Check: N/A  
  
Consults: GI 2001  
  
DM: No  
  
Cardiac Surgical prep: No  
  
SIGNATURE: Krystal Castellanos RN CHECKED BY:  
DATE of SERVICE: 2011  
TIME of SERVICE: 2:23 PM  
  
  
   
   
                                Anemia of chronic disease 2011  
   
                                Intestinal polyp 2011  
   
                                                      
  
  
Overview:Formatting of this note might be different from the original.  
Distal ileum ulcerated polyp.  
   
   
                                Nonspecific abnormal results of liver function s  
tudy 2006  
   
                                Impotence of organic origin 2006  
   
                                Obesity, Class III, BMI 40-49.9 (morbid obesity)  
                 2021  
  
documented as of this encounter (statuses as of 2023)  
[facility]- History of Past illness Narrative*   
  
                                Problem         Noted Date      Resolved Date  
   
                                Medicare annual wellness visit, subsequent 2019  
   
                                Encounter for long-term (current) use of medicat  
ions 2018  
   
                                                      
  
  
Overview:Formatting of this note might be different from the original.  
Added automatically from request for surgery [id number]  
   
   
                                History of colonic polyps 2018  
   
                                                      
  
  
Overview:Formatting of this note might be different from the original.  
Added automatically from request for surgery [id number]  
   
   
                                Gastroesophageal reflux disease 2018  
   
                                                      
  
  
Overview:Formatting of this note might be different from the original.  
Added automatically from request for surgery [id number]  
   
   
                                Acute pulmonary edema 10/05/2011      10/06/2011  
   
                                                      
  
  
Overview:Formatting of this note might be different from the original.  
10/5/2011  
cardiogenic and noncardiogenic factors  
  
   
   
                                Atelectasis     10/05/2011      10/06/2011  
   
                                Hypotension     10/04/2011      10/06/2011  
   
                                                      
  
  
Overview:Formatting of this note might be different from the original.  
10/4/2011  
goal map 65-80  
   
   
                                Stress hyperglycemia 10/04/2011      10/06/2011  
   
                                                      
  
  
Overview:Formatting of this note might be different from the original.  
10/4/2011  
Perioperative insulin resistance and exacerbation of hyperglycemia.  
Control blood glucose with titration of insulin infusion  
  
10/5/2011  
adequate control  
goal FBG<180  
  
   
   
                                Post-op pain    10/04/2011      10/06/2011  
   
                                Mechanically assisted ventilation 10/04/2011      
  10/05/2011  
   
                                                      
  
  
Overview:Formatting of this note might be different from the original.  
10/4/2011  
optimize MV settings, WTE  
current setting:FiO2, 75%, peep 8  
   
   
                                Cardiac insufficiency 10/04/2011      10/05/2011  
   
                                                      
  
  
Overview:Formatting of this note might be different from the original.  
10/4/2011  
RV mild to moderate post pump  
goal CI>2.3  
   
   
                                Hypoxemia       10/04/2011      10/06/2011  
   
                                discharge planning 2011      10/19/2011  
   
                                                      
  
  
Overview:Formatting of this note might be different from the original.  
age 71,  lives in [place], [place]. No DC needs.  
/  
   
   
                                Pre-op testing  2011      10/04/2011  
   
                                                      
  
  
Overview:Formatting of this note is different from the original.  
Images from the original note were not included.  
[facility]  
PRE-OP CHECKLIST  
  
Surgeon: Deangelo Angulo M.D. Informed Consent Completed: No  
STS Score: 1.4%  
CAD: Yes - CAD on Problem List: Yes  
Is intended procedure a CABG: Yes - is a beta blocker ordered? Yes  
  
H & P completed: Yes  
  
PA/LAT: Completed CT: Completed MRI: N/A LE US: N/A  
  
Cath: Yes - reviewed: Yes Echo:Completed EKG: Completed EF %: 61  
  
PI's: N/A Carotid: Completed Mapping: N/A Dental: Completed PFT's: N/A  
  
Basename 11 0729  
WBC 7.18  
HB 13.1  
HCT 41.8  
  
INR 1.0  
CREAT 1.35  
  
UA: Normal  
HCG:N/A  
  
ABO/ABO Confirmed: Yes  
  
Blood ordered: No  
  
SA Swab: Yes - results: Pending  
  
Last Dose of Anticoagulation: vitamins  
  
Op Note: N/A  
  
Pacemaker Check: N/A  
  
Consults: GI 2001  
  
DM: No  
  
Cardiac Surgical prep: No  
  
SIGNATURE: Krystal Castellanos RN CHECKED BY:  
DATE of SERVICE: 2011  
TIME of SERVICE: 2:23 PM  
  
  
   
   
                                Anemia of chronic disease 2011  
   
                                Intestinal polyp 2011  
   
                                                      
  
  
Overview:Formatting of this note might be different from the original.  
Distal ileum ulcerated polyp.  
   
   
                                Nonspecific abnormal results of liver function s  
pepe 2006  
   
                                Impotence of organic origin 2006  
   
                                Obesity, Class III, BMI 40-49.9 (morbid obesity)  
                 2021  
  
documented as of this encounter (statuses as of 2023)  
[facility]- History of Past illness Narrative*   
  
                                Problem         Noted Date      Resolved Date  
   
                                Medicare annual wellness visit, subsequent 2019  
   
                                Encounter for long-term (current) use of medicat  
ions 2018  
   
                                                      
  
  
Overview:Formatting of this note might be different from the original.  
Added automatically from request for surgery [id number]  
   
   
                                History of colonic polyps 2018  
   
                                                      
  
  
Overview:Formatting of this note might be different from the original.  
Added automatically from request for surgery [id number]  
   
   
                                Gastroesophageal reflux disease 2018  
   
                                                      
  
  
Overview:Formatting of this note might be different from the original.  
Added automatically from request for surgery [id number]  
   
   
                                Acute pulmonary edema 10/05/2011      10/06/2011  
   
                                                      
  
  
Overview:Formatting of this note might be different from the original.  
10/5/2011  
cardiogenic and noncardiogenic factors  
  
   
   
                                Atelectasis     10/05/2011      10/06/2011  
   
                                Hypotension     10/04/2011      10/06/2011  
   
                                                      
  
  
Overview:Formatting of this note might be different from the original.  
10/4/2011  
goal map 65-80  
   
   
                                Stress hyperglycemia 10/04/2011      10/06/2011  
   
                                                      
  
  
Overview:Formatting of this note might be different from the original.  
10/4/2011  
Perioperative insulin resistance and exacerbation of hyperglycemia.  
Control blood glucose with titration of insulin infusion  
  
10/5/2011  
adequate control  
goal FBG<180  
  
   
   
                                Post-op pain    10/04/2011      10/06/2011  
   
                                Mechanically assisted ventilation 10/04/2011      
  10/05/2011  
   
                                                      
  
  
Overview:Formatting of this note might be different from the original.  
10/4/2011  
optimize MV settings, WTE  
current setting:FiO2, 75%, peep 8  
   
   
                                Cardiac insufficiency 10/04/2011      10/05/2011  
   
                                                      
  
  
Overview:Formatting of this note might be different from the original.  
10/4/2011  
RV mild to moderate post pump  
goal CI>2.3  
   
   
                                Hypoxemia       10/04/2011      10/06/2011  
   
                                discharge planning 2011      10/19/2011  
   
                                                      
  
  
Overview:Formatting of this note might be different from the original.  
age 71,  lives in [place], [place]. No DC needs.  
/  
   
   
                                Pre-op testing  2011      10/04/2011  
   
                                                      
  
  
Overview:Formatting of this note is different from the original.  
Images from the original note were not included.  
[facility]  
PRE-OP CHECKLIST  
  
Surgeon: Deangelo Angulo M.D. Informed Consent Completed: No  
STS Score: 1.4%  
CAD: Yes - CAD on Problem List: Yes  
Is intended procedure a CABG: Yes - is a beta blocker ordered? Yes  
  
H & P completed: Yes  
  
PA/LAT: Completed CT: Completed MRI: N/A LE US: N/A  
  
Cath: Yes - reviewed: Yes Echo:Completed EKG: Completed EF %: 61  
  
PI's: N/A Carotid: Completed Mapping: N/A Dental: Completed PFT's: N/A  
  
Basename 11 0729  
WBC 7.18  
HB 13.1  
HCT 41.8  
  
INR 1.0  
CREAT 1.35  
  
UA: Normal  
HCG:N/A  
  
ABO/ABO Confirmed: Yes  
  
Blood ordered: No  
  
SA Swab: Yes - results: Pending  
  
Last Dose of Anticoagulation: vitamins  
  
Op Note: N/A  
  
Pacemaker Check: N/A  
  
Consults: GI 2001  
  
DM: No  
  
Cardiac Surgical prep: No  
  
SIGNATURE: Krystal Castellanos RN CHECKED BY:  
DATE of SERVICE: 2011  
TIME of SERVICE: 2:23 PM  
  
  
   
   
                                Anemia of chronic disease 2011  
   
                                Intestinal polyp 2011  
   
                                                      
  
  
Overview:Formatting of this note might be different from the original.  
Distal ileum ulcerated polyp.  
   
   
                                Nonspecific abnormal results of liver function s  
tudy 2006  
   
                                Impotence of organic origin 2006  
   
                                Obesity, Class III, BMI 40-49.9 (morbid obesity)  
                 2021  
  
documented as of this encounter (statuses as of 2023)  
[facility]06- History of Past illness Narrative*   
  
                                Problem         Noted Date      Resolved Date  
   
                                Medicare annual wellness visit, subsequent 2019  
   
                                Encounter for long-term (current) use of medicat  
ions 2018  
   
                                                      
  
  
Overview:Formatting of this note might be different from the original.  
Added automatically from request for surgery [id number]  
   
   
                                History of colonic polyps 2018  
   
                                                      
  
  
Overview:Formatting of this note might be different from the original.  
Added automatically from request for surgery [id number]  
   
   
                                Gastroesophageal reflux disease 2018  
   
                                                      
  
  
Overview:Formatting of this note might be different from the original.  
Added automatically from request for surgery [id number]  
   
   
                                Acute pulmonary edema 10/05/2011      10/06/2011  
   
                                                      
  
  
Overview:Formatting of this note might be different from the original.  
10/5/2011  
cardiogenic and noncardiogenic factors  
  
   
   
                                Atelectasis     10/05/2011      10/06/2011  
   
                                Hypotension     10/04/2011      10/06/2011  
   
                                                      
  
  
Overview:Formatting of this note might be different from the original.  
10/4/2011  
goal map 65-80  
   
   
                                Stress hyperglycemia 10/04/2011      10/06/2011  
   
                                                      
  
  
Overview:Formatting of this note might be different from the original.  
10/4/2011  
Perioperative insulin resistance and exacerbation of hyperglycemia.  
Control blood glucose with titration of insulin infusion  
  
10/5/2011  
adequate control  
goal FBG<180  
  
   
   
                                Post-op pain    10/04/2011      10/06/2011  
   
                                Mechanically assisted ventilation 10/04/2011      
  10/05/2011  
   
                                                      
  
  
Overview:Formatting of this note might be different from the original.  
10/4/2011  
optimize MV settings, WTE  
current setting:FiO2, 75%, peep 8  
   
   
                                Cardiac insufficiency 10/04/2011      10/05/2011  
   
                                                      
  
  
Overview:Formatting of this note might be different from the original.  
10/4/2011  
RV mild to moderate post pump  
goal CI>2.3  
   
   
                                Hypoxemia       10/04/2011      10/06/2011  
   
                                discharge planning 2011      10/19/2011  
   
                                                      
  
  
Overview:Formatting of this note might be different from the original.  
age 71,  lives in [place], [place]. No DC needs.  
/  
   
   
                                Pre-op testing  2011      10/04/2011  
   
                                                      
  
  
Overview:Formatting of this note is different from the original.  
Images from the original note were not included.  
[facility]  
PRE-OP CHECKLIST  
  
Surgeon: Deangelo Angulo M.D. Informed Consent Completed: No  
STS Score: 1.4%  
CAD: Yes - CAD on Problem List: Yes  
Is intended procedure a CABG: Yes - is a beta blocker ordered? Yes  
  
H & P completed: Yes  
  
PA/LAT: Completed CT: Completed MRI: N/A LE US: N/A  
  
Cath: Yes - reviewed: Yes Echo:Completed EKG: Completed EF %: 61  
  
PI's: N/A Carotid: Completed Mapping: N/A Dental: Completed PFT's: N/A  
  
Basename 11 0729  
WBC 7.18  
HB 13.1  
HCT 41.8  
  
INR 1.0  
CREAT 1.35  
  
UA: Normal  
HCG:N/A  
  
ABO/ABO Confirmed: Yes  
  
Blood ordered: No  
  
SA Swab: Yes - results: Pending  
  
Last Dose of Anticoagulation: vitamins  
  
Op Note: N/A  
  
Pacemaker Check: N/A  
  
Consults: GI 2001  
  
DM: No  
  
Cardiac Surgical prep: No  
  
SIGNATURE: Krystal Castellanos RN CHECKED BY:  
DATE of SERVICE: 2011  
TIME of SERVICE: 2:23 PM  
  
  
   
   
                                Anemia of chronic disease 2011  
   
                                Intestinal polyp 2011  
   
                                                      
  
  
Overview:Formatting of this note might be different from the original.  
Distal ileum ulcerated polyp.  
   
   
                                Nonspecific abnormal results of liver function s  
tudy 2006  
   
                                Impotence of organic origin 2006  
   
                                Obesity, Class III, BMI 40-49.9 (morbid obesity)  
                 2021  
  
documented as of this encounter (statuses as of 2023)  
[facility]06- History of Past illness Narrative*   
  
                                Problem         Noted Date      Resolved Date  
   
                                Medicare annual wellness visit, subsequent 2019  
   
                                Encounter for long-term (current) use of medicat  
ions 2018  
   
                                                      
  
  
Overview:Formatting of this note might be different from the original.  
Added automatically from request for surgery [id number]  
   
   
                                History of colonic polyps 2018  
   
                                                      
  
  
Overview:Formatting of this note might be different from the original.  
Added automatically from request for surgery [id number]  
   
   
                                Gastroesophageal reflux disease 2018  
   
                                                      
  
  
Overview:Formatting of this note might be different from the original.  
Added automatically from request for surgery [id number]  
   
   
                                Acute pulmonary edema 10/05/2011      10/06/2011  
   
                                                      
  
  
Overview:Formatting of this note might be different from the original.  
10/5/2011  
cardiogenic and noncardiogenic factors  
  
   
   
                                Atelectasis     10/05/2011      10/06/2011  
   
                                Hypotension     10/04/2011      10/06/2011  
   
                                                      
  
  
Overview:Formatting of this note might be different from the original.  
10/4/2011  
goal map 65-80  
   
   
                                Stress hyperglycemia 10/04/2011      10/06/2011  
   
                                                      
  
  
Overview:Formatting of this note might be different from the original.  
10/4/2011  
Perioperative insulin resistance and exacerbation of hyperglycemia.  
Control blood glucose with titration of insulin infusion  
  
10/5/2011  
adequate control  
goal FBG<180  
  
   
   
                                Post-op pain    10/04/2011      10/06/2011  
   
                                Mechanically assisted ventilation 10/04/2011      
  10/05/2011  
   
                                                      
  
  
Overview:Formatting of this note might be different from the original.  
10/4/2011  
optimize MV settings, WTE  
current setting:FiO2, 75%, peep 8  
   
   
                                Cardiac insufficiency 10/04/2011      10/05/2011  
   
                                                      
  
  
Overview:Formatting of this note might be different from the original.  
10/4/2011  
RV mild to moderate post pump  
goal CI>2.3  
   
   
                                Hypoxemia       10/04/2011      10/06/2011  
   
                                discharge planning 2011      10/19/2011  
   
                                                      
  
  
Overview:Formatting of this note might be different from the original.  
age 71,  lives in [place], [place]. No DC needs.  
/  
   
   
                                Pre-op testing  2011      10/04/2011  
   
                                                      
  
  
Overview:Formatting of this note is different from the original.  
Images from the original note were not included.  
[facility]  
PRE-OP CHECKLIST  
  
Surgeon: Deangelo Angulo M.D. Informed Consent Completed: No  
STS Score: 1.4%  
CAD: Yes - CAD on Problem List: Yes  
Is intended procedure a CABG: Yes - is a beta blocker ordered? Yes  
  
H & P completed: Yes  
  
PA/LAT: Completed CT: Completed MRI: N/A LE US: N/A  
  
Cath: Yes - reviewed: Yes Echo:Completed EKG: Completed EF %: 61  
  
PI's: N/A Carotid: Completed Mapping: N/A Dental: Completed PFT's: N/A  
  
Basename 11 0729  
WBC 7.18  
HB 13.1  
HCT 41.8  
  
INR 1.0  
CREAT 1.35  
  
UA: Normal  
HCG:N/A  
  
ABO/ABO Confirmed: Yes  
  
Blood ordered: No  
  
SA Swab: Yes - results: Pending  
  
Last Dose of Anticoagulation: vitamins  
  
Op Note: N/A  
  
Pacemaker Check: N/A  
  
Consults: GI 2001  
  
DM: No  
  
Cardiac Surgical prep: No  
  
SIGNATURE: Krystal Castellanos RN CHECKED BY:  
DATE of SERVICE: 2011  
TIME of SERVICE: 2:23 PM  
  
  
   
   
                                Anemia of chronic disease 2011  
   
                                Intestinal polyp 2011  
   
                                                      
  
  
Overview:Formatting of this note might be different from the original.  
Distal ileum ulcerated polyp.  
   
   
                                Nonspecific abnormal results of liver function s  
pepe 2006  
   
                                Impotence of organic origin 2006      09/0  
2015  
   
                                Obesity, Class III, BMI 40-49.9 (morbid obesity)  
                 2021  
  
documented as of this encounter (statuses as of 2023)  
[facility]- History of Past illness Narrative*   
  
                                Problem         Noted Date      Resolved Date  
   
                                Medicare annual wellness visit, subsequent 2019  
   
                                Encounter for long-term (current) use of medicat  
ions 2018  
   
                                                      
  
  
Overview:Formatting of this note might be different from the original.  
Added automatically from request for surgery [id number]  
   
   
                                History of colonic polyps 2018  
   
                                                      
  
  
Overview:Formatting of this note might be different from the original.  
Added automatically from request for surgery [id number]  
   
   
                                Gastroesophageal reflux disease 2018  
   
                                                      
  
  
Overview:Formatting of this note might be different from the original.  
Added automatically from request for surgery [id number]  
   
   
                                Acute pulmonary edema 10/05/2011      10/06/2011  
   
                                                      
  
  
Overview:Formatting of this note might be different from the original.  
10/5/2011  
cardiogenic and noncardiogenic factors  
  
   
   
                                Atelectasis     10/05/2011      10/06/2011  
   
                                Hypotension     10/04/2011      10/06/2011  
   
                                                      
  
  
Overview:Formatting of this note might be different from the original.  
10/4/2011  
goal map 65-80  
   
   
                                Stress hyperglycemia 10/04/2011      10/06/2011  
   
                                                      
  
  
Overview:Formatting of this note might be different from the original.  
10/4/2011  
Perioperative insulin resistance and exacerbation of hyperglycemia.  
Control blood glucose with titration of insulin infusion  
  
10/5/2011  
adequate control  
goal FBG<180  
  
   
   
                                Post-op pain    10/04/2011      10/06/2011  
   
                                Mechanically assisted ventilation 10/04/2011      
  10/05/2011  
   
                                                      
  
  
Overview:Formatting of this note might be different from the original.  
10/4/2011  
optimize MV settings, WTE  
current setting:FiO2, 75%, peep 8  
   
   
                                Cardiac insufficiency 10/04/2011      10/05/2011  
   
                                                      
  
  
Overview:Formatting of this note might be different from the original.  
10/4/2011  
RV mild to moderate post pump  
goal CI>2.3  
   
   
                                Hypoxemia       10/04/2011      10/06/2011  
   
                                discharge planning 2011      10/19/2011  
   
                                                      
  
  
Overview:Formatting of this note might be different from the original.  
age 71,  lives in [place], [place]. No DC needs.  
/  
   
   
                                Pre-op testing  2011      10/04/2011  
   
                                                      
  
  
Overview:Formatting of this note is different from the original.  
Images from the original note were not included.  
[facility]  
PRE-OP CHECKLIST  
  
Surgeon: Deangelo Angulo M.D. Informed Consent Completed: No  
STS Score: 1.4%  
CAD: Yes - CAD on Problem List: Yes  
Is intended procedure a CABG: Yes - is a beta blocker ordered? Yes  
  
H & P completed: Yes  
  
PA/LAT: Completed CT: Completed MRI: N/A LE US: N/A  
  
Cath: Yes - reviewed: Yes Echo:Completed EKG: Completed EF %: 61  
  
PI's: N/A Carotid: Completed Mapping: N/A Dental: Completed PFT's: N/A  
  
Basename 11 0729  
WBC 7.18  
HB 13.1  
HCT 41.8  
  
INR 1.0  
CREAT 1.35  
  
UA: Normal  
HCG:N/A  
  
ABO/ABO Confirmed: Yes  
  
Blood ordered: No  
  
SA Swab: Yes - results: Pending  
  
Last Dose of Anticoagulation: vitamins  
  
Op Note: N/A  
  
Pacemaker Check: N/A  
  
Consults: GI 2001  
  
DM: No  
  
Cardiac Surgical prep: No  
  
SIGNATURE: Krystal Castellanos RN CHECKED BY:  
DATE of SERVICE: 2011  
TIME of SERVICE: 2:23 PM  
  
  
   
   
                                Anemia of chronic disease 2011  
   
                                Intestinal polyp 2011  
   
                                                      
  
  
Overview:Formatting of this note might be different from the original.  
Distal ileum ulcerated polyp.  
   
   
                                Nonspecific abnormal results of liver function s  
nialldy 2006  
   
                                Impotence of organic origin 2006  
   
                                Obesity, Class III, BMI 40-49.9 (morbid obesity)  
                 2021  
  
documented as of this encounter (statuses as of 2023)  
[facility]- History of Past illness Narrative*   
  
                                Problem         Noted Date      Resolved Date  
   
                                Medicare annual wellness visit, subsequent 2019  
   
                                Encounter for long-term (current) use of medicat  
ions 2018  
   
                                                      
  
  
Overview:Formatting of this note might be different from the original.  
Added automatically from request for surgery [id number]  
   
   
                                History of colonic polyps 2018  
   
                                                      
  
  
Overview:Formatting of this note might be different from the original.  
Added automatically from request for surgery [id number]  
   
   
                                Gastroesophageal reflux disease 2018  
   
                                                      
  
  
Overview:Formatting of this note might be different from the original.  
Added automatically from request for surgery [id number]  
   
   
                                Acute pulmonary edema 10/05/2011      10/06/2011  
   
                                                      
  
  
Overview:Formatting of this note might be different from the original.  
10/5/2011  
cardiogenic and noncardiogenic factors  
  
   
   
                                Atelectasis     10/05/2011      10/06/2011  
   
                                Hypotension     10/04/2011      10/06/2011  
   
                                                      
  
  
Overview:Formatting of this note might be different from the original.  
10/4/2011  
goal map 65-80  
   
   
                                Stress hyperglycemia 10/04/2011      10/06/2011  
   
                                                      
  
  
Overview:Formatting of this note might be different from the original.  
10/4/2011  
Perioperative insulin resistance and exacerbation of hyperglycemia.  
Control blood glucose with titration of insulin infusion  
  
10/5/2011  
adequate control  
goal FBG<180  
  
   
   
                                Post-op pain    10/04/2011      10/06/2011  
   
                                Mechanically assisted ventilation 10/04/2011      
  10/05/2011  
   
                                                      
  
  
Overview:Formatting of this note might be different from the original.  
10/4/2011  
optimize MV settings, WTE  
current setting:FiO2, 75%, peep 8  
   
   
                                Cardiac insufficiency 10/04/2011      10/05/2011  
   
                                                      
  
  
Overview:Formatting of this note might be different from the original.  
10/4/2011  
RV mild to moderate post pump  
goal CI>2.3  
   
   
                                Hypoxemia       10/04/2011      10/06/2011  
   
                                discharge planning 2011      10/19/2011  
   
                                                      
  
  
Overview:Formatting of this note might be different from the original.  
age 71,  lives in [place], [place]. No DC needs.  
/  
   
   
                                Pre-op testing  2011      10/04/2011  
   
                                                      
  
  
Overview:Formatting of this note is different from the original.  
Images from the original note were not included.  
[facility]  
PRE-OP CHECKLIST  
  
Surgeon: Deangelo Angulo M.D. Informed Consent Completed: No  
STS Score: 1.4%  
CAD: Yes - CAD on Problem List: Yes  
Is intended procedure a CABG: Yes - is a beta blocker ordered? Yes  
  
H & P completed: Yes  
  
PA/LAT: Completed CT: Completed MRI: N/A LE US: N/A  
  
Cath: Yes - reviewed: Yes Echo:Completed EKG: Completed EF %: 61  
  
PI's: N/A Carotid: Completed Mapping: N/A Dental: Completed PFT's: N/A  
  
Basename 11 0729  
WBC 7.18  
HB 13.1  
HCT 41.8  
  
INR 1.0  
CREAT 1.35  
  
UA: Normal  
HCG:N/A  
  
ABO/ABO Confirmed: Yes  
  
Blood ordered: No  
  
SA Swab: Yes - results: Pending  
  
Last Dose of Anticoagulation: vitamins  
  
Op Note: N/A  
  
Pacemaker Check: N/A  
  
Consults: GI 2001  
  
DM: No  
  
Cardiac Surgical prep: No  
  
SIGNATURE: Krystal Castellanos RN CHECKED BY:  
DATE of SERVICE: 2011  
TIME of SERVICE: 2:23 PM  
  
  
   
   
                                Anemia of chronic disease 2011  
   
                                Intestinal polyp 2011  
   
                                                      
  
  
Overview:Formatting of this note might be different from the original.  
Distal ileum ulcerated polyp.  
   
   
                                Nonspecific abnormal results of liver function s  
tudy 2006  
   
                                Impotence of organic origin 2006  
   
                                Obesity, Class III, BMI 40-49.9 (morbid obesity)  
                 2021  
  
documented as of this encounter (statuses as of 2023)  
[facility]- History of Past illness Narrative*   
  
                                Problem         Noted Date      Resolved Date  
   
                                Medicare annual wellness visit, subsequent 2019  
   
                                Encounter for long-term (current) use of medicat  
ions 2018  
   
                                                      
  
  
Overview:Formatting of this note might be different from the original.  
Added automatically from request for surgery [id number]  
   
   
                                History of colonic polyps 2018  
   
                                                      
  
  
Overview:Formatting of this note might be different from the original.  
Added automatically from request for surgery [id number]  
   
   
                                Gastroesophageal reflux disease 2018  
   
                                                      
  
  
Overview:Formatting of this note might be different from the original.  
Added automatically from request for surgery [id number]  
   
   
                                Acute pulmonary edema 10/05/2011      10/06/2011  
   
                                                      
  
  
Overview:Formatting of this note might be different from the original.  
10/5/2011  
cardiogenic and noncardiogenic factors  
  
   
   
                                Atelectasis     10/05/2011      10/06/2011  
   
                                Hypotension     10/04/2011      10/06/2011  
   
                                                      
  
  
Overview:Formatting of this note might be different from the original.  
10/4/2011  
goal map 65-80  
   
   
                                Stress hyperglycemia 10/04/2011      10/06/2011  
   
                                                      
  
  
Overview:Formatting of this note might be different from the original.  
10/4/2011  
Perioperative insulin resistance and exacerbation of hyperglycemia.  
Control blood glucose with titration of insulin infusion  
  
10/5/2011  
adequate control  
goal FBG<180  
  
   
   
                                Post-op pain    10/04/2011      10/06/2011  
   
                                Mechanically assisted ventilation 10/04/2011      
  10/05/2011  
   
                                                      
  
  
Overview:Formatting of this note might be different from the original.  
10/4/2011  
optimize MV settings, WTE  
current setting:FiO2, 75%, peep 8  
   
   
                                Cardiac insufficiency 10/04/2011      10/05/2011  
   
                                                      
  
  
Overview:Formatting of this note might be different from the original.  
10/4/2011  
RV mild to moderate post pump  
goal CI>2.3  
   
   
                                Hypoxemia       10/04/2011      10/06/2011  
   
                                discharge planning 2011      10/19/2011  
   
                                                      
  
  
Overview:Formatting of this note might be different from the original.  
age 71,  lives in [place], [place]. No DC needs.  
/  
   
   
                                Pre-op testing  2011      10/04/2011  
   
                                                      
  
  
Overview:Formatting of this note is different from the original.  
Images from the original note were not included.  
[facility]  
PRE-OP CHECKLIST  
  
Surgeon: Deangelo Angulo M.D. Informed Consent Completed: No  
STS Score: 1.4%  
CAD: Yes - CAD on Problem List: Yes  
Is intended procedure a CABG: Yes - is a beta blocker ordered? Yes  
  
H & P completed: Yes  
  
PA/LAT: Completed CT: Completed MRI: N/A LE US: N/A  
  
Cath: Yes - reviewed: Yes Echo:Completed EKG: Completed EF %: 61  
  
PI's: N/A Carotid: Completed Mapping: N/A Dental: Completed PFT's: N/A  
  
Basename 11 0729  
WBC 7.18  
HB 13.1  
HCT 41.8  
  
INR 1.0  
CREAT 1.35  
  
UA: Normal  
HCG:N/A  
  
ABO/ABO Confirmed: Yes  
  
Blood ordered: No  
  
SA Swab: Yes - results: Pending  
  
Last Dose of Anticoagulation: vitamins  
  
Op Note: N/A  
  
Pacemaker Check: N/A  
  
Consults: GI 2001  
  
DM: No  
  
Cardiac Surgical prep: No  
  
SIGNATURE: Krystal Castellanos RN CHECKED BY:  
DATE of SERVICE: 2011  
TIME of SERVICE: 2:23 PM  
  
  
   
   
                                Anemia of chronic disease 2011  
   
                                Intestinal polyp 2011  
   
                                                      
  
  
Overview:Formatting of this note might be different from the original.  
Distal ileum ulcerated polyp.  
   
   
                                Nonspecific abnormal results of liver function s  
tudy 2006  
   
                                Impotence of organic origin 2006      09/0  
2015  
   
                                Obesity, Class III, BMI 40-49.9 (morbid obesity)  
                 2021  
  
documented as of this encounter (statuses as of 2023)  
[facility]06- History of Past illness Narrative*   
  
                                Problem         Noted Date      Resolved Date  
   
                                Medicare annual wellness visit, subsequent 2019  
   
                                Encounter for long-term (current) use of medicat  
ions 2018  
   
                                                      
  
  
Overview:Formatting of this note might be different from the original.  
Added automatically from request for surgery [id number]  
   
   
                                History of colonic polyps 2018  
   
                                                      
  
  
Overview:Formatting of this note might be different from the original.  
Added automatically from request for surgery [id number]  
   
   
                                Gastroesophageal reflux disease 2018  
   
                                                      
  
  
Overview:Formatting of this note might be different from the original.  
Added automatically from request for surgery [id number]  
   
   
                                Acute pulmonary edema 10/05/2011      10/06/2011  
   
                                                      
  
  
Overview:Formatting of this note might be different from the original.  
10/5/2011  
cardiogenic and noncardiogenic factors  
  
   
   
                                Atelectasis     10/05/2011      10/06/2011  
   
                                Hypotension     10/04/2011      10/06/2011  
   
                                                      
  
  
Overview:Formatting of this note might be different from the original.  
10/4/2011  
goal map 65-80  
   
   
                                Stress hyperglycemia 10/04/2011      10/06/2011  
   
                                                      
  
  
Overview:Formatting of this note might be different from the original.  
10/4/2011  
Perioperative insulin resistance and exacerbation of hyperglycemia.  
Control blood glucose with titration of insulin infusion  
  
10/5/2011  
adequate control  
goal FBG<180  
  
   
   
                                Post-op pain    10/04/2011      10/06/2011  
   
                                Mechanically assisted ventilation 10/04/2011      
  10/05/2011  
   
                                                      
  
  
Overview:Formatting of this note might be different from the original.  
10/4/2011  
optimize MV settings, WTE  
current setting:FiO2, 75%, peep 8  
   
   
                                Cardiac insufficiency 10/04/2011      10/05/2011  
   
                                                      
  
  
Overview:Formatting of this note might be different from the original.  
10/4/2011  
RV mild to moderate post pump  
goal CI>2.3  
   
   
                                Hypoxemia       10/04/2011      10/06/2011  
   
                                discharge planning 2011      10/19/2011  
   
                                                      
  
  
Overview:Formatting of this note might be different from the original.  
age 71,  lives in [place], [place]. No DC needs.  
/  
   
   
                                Pre-op testing  2011      10/04/2011  
   
                                                      
  
  
Overview:Formatting of this note is different from the original.  
Images from the original note were not included.  
[facility]  
PRE-OP CHECKLIST  
  
Surgeon: Deangelo Angulo M.D. Informed Consent Completed: No  
STS Score: 1.4%  
CAD: Yes - CAD on Problem List: Yes  
Is intended procedure a CABG: Yes - is a beta blocker ordered? Yes  
  
H & P completed: Yes  
  
PA/LAT: Completed CT: Completed MRI: N/A LE US: N/A  
  
Cath: Yes - reviewed: Yes Echo:Completed EKG: Completed EF %: 61  
  
PI's: N/A Carotid: Completed Mapping: N/A Dental: Completed PFT's: N/A  
  
Basename 11 0729  
WBC 7.18  
HB 13.1  
HCT 41.8  
  
INR 1.0  
CREAT 1.35  
  
UA: Normal  
HCG:N/A  
  
ABO/ABO Confirmed: Yes  
  
Blood ordered: No  
  
SA Swab: Yes - results: Pending  
  
Last Dose of Anticoagulation: vitamins  
  
Op Note: N/A  
  
Pacemaker Check: N/A  
  
Consults: GI 2001  
  
DM: No  
  
Cardiac Surgical prep: No  
  
SIGNATURE: Krystal Castellanos RN CHECKED BY:  
DATE of SERVICE: 2011  
TIME of SERVICE: 2:23 PM  
  
  
   
   
                                Anemia of chronic disease 2011  
   
                                Intestinal polyp 2011  
   
                                                      
  
  
Overview:Formatting of this note might be different from the original.  
Distal ileum ulcerated polyp.  
   
   
                                Nonspecific abnormal results of liver function s  
pepe 2006  
   
                                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  
   
                                Obesity, Class III, BMI 40-49.9 (morbid obesity)  
                 2021  
  
documented as of this encounter (statuses as of 2023)  
[facility]06- History of Past illness Narrative*   
  
                                Problem         Noted Date      Resolved Date  
   
                                Medicare annual wellness visit, subsequent 2019  
   
                                Encounter for long-term (current) use of medicat  
ions 2018  
   
                                                      
  
  
Overview:Formatting of this note might be different from the original.  
Added automatically from request for surgery [id number]  
   
   
                                History of colonic polyps 2018  
   
                                                      
  
  
Overview:Formatting of this note might be different from the original.  
Added automatically from request for surgery [id number]  
   
   
                                Gastroesophageal reflux disease 2018  
   
                                                      
  
  
Overview:Formatting of this note might be different from the original.  
Added automatically from request for surgery [id number]  
   
   
                                Acute pulmonary edema 10/05/2011      10/06/2011  
   
                                                      
  
  
Overview:Formatting of this note might be different from the original.  
10/5/2011  
cardiogenic and noncardiogenic factors  
  
   
   
                                Atelectasis     10/05/2011      10/06/2011  
   
                                Hypotension     10/04/2011      10/06/2011  
   
                                                      
  
  
Overview:Formatting of this note might be different from the original.  
10/4/2011  
goal map 65-80  
   
   
                                Stress hyperglycemia 10/04/2011      10/06/2011  
   
                                                      
  
  
Overview:Formatting of this note might be different from the original.  
10/4/2011  
Perioperative insulin resistance and exacerbation of hyperglycemia.  
Control blood glucose with titration of insulin infusion  
  
10/5/2011  
adequate control  
goal FBG<180  
  
   
   
                                Post-op pain    10/04/2011      10/06/2011  
   
                                Mechanically assisted ventilation 10/04/2011      
  10/05/2011  
   
                                                      
  
  
Overview:Formatting of this note might be different from the original.  
10/4/2011  
optimize MV settings, WTE  
current setting:FiO2, 75%, peep 8  
   
   
                                Cardiac insufficiency 10/04/2011      10/05/2011  
   
                                                      
  
  
Overview:Formatting of this note might be different from the original.  
10/4/2011  
RV mild to moderate post pump  
goal CI>2.3  
   
   
                                Hypoxemia       10/04/2011      10/06/2011  
   
                                discharge planning 2011      10/19/2011  
   
                                                      
  
  
Overview:Formatting of this note might be different from the original.  
age 71,  lives in [place], [place]. No DC needs.  
/  
   
   
                                Pre-op testing  2011      10/04/2011  
   
                                                      
  
  
Overview:Formatting of this note is different from the original.  
Images from the original note were not included.  
[facility]  
PRE-OP CHECKLIST  
  
Surgeon: Deangelo Angulo M.D. Informed Consent Completed: No  
STS Score: 1.4%  
CAD: Yes - CAD on Problem List: Yes  
Is intended procedure a CABG: Yes - is a beta blocker ordered? Yes  
  
H & P completed: Yes  
  
PA/LAT: Completed CT: Completed MRI: N/A LE US: N/A  
  
Cath: Yes - reviewed: Yes Echo:Completed EKG: Completed EF %: 61  
  
PI's: N/A Carotid: Completed Mapping: N/A Dental: Completed PFT's: N/A  
  
Basename 11 0729  
WBC 7.18  
HB 13.1  
HCT 41.8  
  
INR 1.0  
CREAT 1.35  
  
UA: Normal  
HCG:N/A  
  
ABO/ABO Confirmed: Yes  
  
Blood ordered: No  
  
SA Swab: Yes - results: Pending  
  
Last Dose of Anticoagulation: vitamins  
  
Op Note: N/A  
  
Pacemaker Check: N/A  
  
Consults: GI 2001  
  
DM: No  
  
Cardiac Surgical prep: No  
  
SIGNATURE: Krystal Castellanos RN CHECKED BY:  
DATE of SERVICE: 2011  
TIME of SERVICE: 2:23 PM  
  
  
   
   
                                Anemia of chronic disease 2011  
   
                                Intestinal polyp 2011  
   
                                                      
  
  
Overview:Formatting of this note might be different from the original.  
Distal ileum ulcerated polyp.  
   
   
                                Nonspecific abnormal results of liver function s  
tudy 2006  
   
                                Impotence of organic origin 2006  
   
                                Obesity, Class III, BMI 40-49.9 (morbid obesity)  
                 2021  
  
documented as of this encounter (statuses as of 2023)  
[facility]- History of Past illness Narrative*   
  
                                Problem         Noted Date      Resolved Date  
   
                                Medicare annual wellness visit, subsequent 2019  
   
                                Encounter for long-term (current) use of medicat  
ions 2018  
   
                                                      
  
  
Overview:Formatting of this note might be different from the original.  
Added automatically from request for surgery [id number]  
   
   
                                History of colonic polyps 2018  
   
                                                      
  
  
Overview:Formatting of this note might be different from the original.  
Added automatically from request for surgery [id number]  
   
   
                                Gastroesophageal reflux disease 2018  
   
                                                      
  
  
Overview:Formatting of this note might be different from the original.  
Added automatically from request for surgery [id number]  
   
   
                                Acute pulmonary edema 10/05/2011      10/06/2011  
   
                                                      
  
  
Overview:Formatting of this note might be different from the original.  
10/5/2011  
cardiogenic and noncardiogenic factors  
  
   
   
                                Atelectasis     10/05/2011      10/06/2011  
   
                                Hypotension     10/04/2011      10/06/2011  
   
                                                      
  
  
Overview:Formatting of this note might be different from the original.  
10/4/2011  
goal map 65-80  
   
   
                                Stress hyperglycemia 10/04/2011      10/06/2011  
   
                                                      
  
  
Overview:Formatting of this note might be different from the original.  
10/4/2011  
Perioperative insulin resistance and exacerbation of hyperglycemia.  
Control blood glucose with titration of insulin infusion  
  
10/5/2011  
adequate control  
goal FBG<180  
  
   
   
                                Post-op pain    10/04/2011      10/06/2011  
   
                                Mechanically assisted ventilation 10/04/2011      
  10/05/2011  
   
                                                      
  
  
Overview:Formatting of this note might be different from the original.  
10/4/2011  
optimize MV settings, WTE  
current setting:FiO2, 75%, peep 8  
   
   
                                Cardiac insufficiency 10/04/2011      10/05/2011  
   
                                                      
  
  
Overview:Formatting of this note might be different from the original.  
10/4/2011  
RV mild to moderate post pump  
goal CI>2.3  
   
   
                                Hypoxemia       10/04/2011      10/06/2011  
   
                                discharge planning 2011      10/19/2011  
   
                                                      
  
  
Overview:Formatting of this note might be different from the original.  
age 71,  lives in [place], [place]. No DC needs.  
/  
   
   
                                Pre-op testing  2011      10/04/2011  
   
                                                      
  
  
Overview:Formatting of this note is different from the original.  
Images from the original note were not included.  
[facility]  
PRE-OP CHECKLIST  
  
Surgeon: Deangelo Angulo M.D. Informed Consent Completed: No  
STS Score: 1.4%  
CAD: Yes - CAD on Problem List: Yes  
Is intended procedure a CABG: Yes - is a beta blocker ordered? Yes  
  
H & P completed: Yes  
  
PA/LAT: Completed CT: Completed MRI: N/A LE US: N/A  
  
Cath: Yes - reviewed: Yes Echo:Completed EKG: Completed EF %: 61  
  
PI's: N/A Carotid: Completed Mapping: N/A Dental: Completed PFT's: N/A  
  
Basename 11 0729  
WBC 7.18  
HB 13.1  
HCT 41.8  
  
INR 1.0  
CREAT 1.35  
  
UA: Normal  
HCG:N/A  
  
ABO/ABO Confirmed: Yes  
  
Blood ordered: No  
  
SA Swab: Yes - results: Pending  
  
Last Dose of Anticoagulation: vitamins  
  
Op Note: N/A  
  
Pacemaker Check: N/A  
  
Consults: GI 2001  
  
DM: No  
  
Cardiac Surgical prep: No  
  
SIGNATURE: Krystal Castellanos RN CHECKED BY:  
DATE of SERVICE: 2011  
TIME of SERVICE: 2:23 PM  
  
  
   
   
                                Anemia of chronic disease 2011  
   
                                Intestinal polyp 2011  
   
                                                      
  
  
Overview:Formatting of this note might be different from the original.  
Distal ileum ulcerated polyp.  
   
   
                                Nonspecific abnormal results of liver function s  
tudy 2006  
   
                                Impotence of organic origin 2006  
   
                                Obesity, Class III, BMI 40-49.9 (morbid obesity)  
                 2021  
  
documented as of this encounter (statuses as of 2023)  
[facility]- History of Past illness Narrative*   
  
                                Problem         Noted Date      Resolved Date  
   
                                Medicare annual wellness visit, subsequent 2019  
   
                                Encounter for long-term (current) use of medicat  
ions 2018  
   
                                                      
  
  
Overview:Formatting of this note might be different from the original.  
Added automatically from request for surgery [id number]  
   
   
                                History of colonic polyps 2018  
   
                                                      
  
  
Overview:Formatting of this note might be different from the original.  
Added automatically from request for surgery [id number]  
   
   
                                Gastroesophageal reflux disease 2018  
   
                                                      
  
  
Overview:Formatting of this note might be different from the original.  
Added automatically from request for surgery [id number]  
   
   
                                Acute pulmonary edema 10/05/2011      10/06/2011  
   
                                                      
  
  
Overview:Formatting of this note might be different from the original.  
10/5/2011  
cardiogenic and noncardiogenic factors  
  
   
   
                                Atelectasis     10/05/2011      10/06/2011  
   
                                Hypotension     10/04/2011      10/06/2011  
   
                                                      
  
  
Overview:Formatting of this note might be different from the original.  
10/4/2011  
goal map 65-80  
   
   
                                Stress hyperglycemia 10/04/2011      10/06/2011  
   
                                                      
  
  
Overview:Formatting of this note might be different from the original.  
10/4/2011  
Perioperative insulin resistance and exacerbation of hyperglycemia.  
Control blood glucose with titration of insulin infusion  
  
10/5/2011  
adequate control  
goal FBG<180  
  
   
   
                                Post-op pain    10/04/2011      10/06/2011  
   
                                Mechanically assisted ventilation 10/04/2011      
  10/05/2011  
   
                                                      
  
  
Overview:Formatting of this note might be different from the original.  
10/4/2011  
optimize MV settings, WTE  
current setting:FiO2, 75%, peep 8  
   
   
                                Cardiac insufficiency 10/04/2011      10/05/2011  
   
                                                      
  
  
Overview:Formatting of this note might be different from the original.  
10/4/2011  
RV mild to moderate post pump  
goal CI>2.3  
   
   
                                Hypoxemia       10/04/2011      10/06/2011  
   
                                discharge planning 2011      10/19/2011  
   
                                                      
  
  
Overview:Formatting of this note might be different from the original.  
age 71,  lives in [place], [place]. No DC needs.  
/  
   
   
                                Pre-op testing  2011      10/04/2011  
   
                                                      
  
  
Overview:Formatting of this note is different from the original.  
Images from the original note were not included.  
[facility]  
PRE-OP CHECKLIST  
  
Surgeon: Deangelo Angulo M.D. Informed Consent Completed: No  
STS Score: 1.4%  
CAD: Yes - CAD on Problem List: Yes  
Is intended procedure a CABG: Yes - is a beta blocker ordered? Yes  
  
H & P completed: Yes  
  
PA/LAT: Completed CT: Completed MRI: N/A LE US: N/A  
  
Cath: Yes - reviewed: Yes Echo:Completed EKG: Completed EF %: 61  
  
PI's: N/A Carotid: Completed Mapping: N/A Dental: Completed PFT's: N/A  
  
Basename 11 0729  
WBC 7.18  
HB 13.1  
HCT 41.8  
  
INR 1.0  
CREAT 1.35  
  
UA: Normal  
HCG:N/A  
  
ABO/ABO Confirmed: Yes  
  
Blood ordered: No  
  
SA Swab: Yes - results: Pending  
  
Last Dose of Anticoagulation: vitamins  
  
Op Note: N/A  
  
Pacemaker Check: N/A  
  
Consults: GI 2001  
  
DM: No  
  
Cardiac Surgical prep: No  
  
SIGNATURE: Krystal Castellanos RN CHECKED BY:  
DATE of SERVICE: 2011  
TIME of SERVICE: 2:23 PM  
  
  
   
   
                                Anemia of chronic disease 2011  
   
                                Intestinal polyp 2011  
   
                                                      
  
  
Overview:Formatting of this note might be different from the original.  
Distal ileum ulcerated polyp.  
   
   
                                Nonspecific abnormal results of liver function s  
tudy 2006  
   
                                Impotence of organic origin 2006  
   
                                Obesity, Class III, BMI 40-49.9 (morbid obesity)  
                 2021  
  
documented as of this encounter (statuses as of 2023)  
[facility]- History of Past illness Narrative*   
  
                                Problem         Noted Date      Resolved Date  
   
                                Medicare annual wellness visit, subsequent 2019  
   
                                Encounter for long-term (current) use of medicat  
ions 2018  
   
                                                      
  
  
Overview:Formatting of this note might be different from the original.  
Added automatically from request for surgery [id number]  
   
   
                                History of colonic polyps 2018  
   
                                                      
  
  
Overview:Formatting of this note might be different from the original.  
Added automatically from request for surgery [id number]  
   
   
                                Gastroesophageal reflux disease 2018  
   
                                                      
  
  
Overview:Formatting of this note might be different from the original.  
Added automatically from request for surgery [id number]  
   
   
                                Acute pulmonary edema 10/05/2011      10/06/2011  
   
                                                      
  
  
Overview:Formatting of this note might be different from the original.  
10/5/2011  
cardiogenic and noncardiogenic factors  
  
   
   
                                Atelectasis     10/05/2011      10/06/2011  
   
                                Hypotension     10/04/2011      10/06/2011  
   
                                                      
  
  
Overview:Formatting of this note might be different from the original.  
10/4/2011  
goal map 65-80  
   
   
                                Stress hyperglycemia 10/04/2011      10/06/2011  
   
                                                      
  
  
Overview:Formatting of this note might be different from the original.  
10/4/2011  
Perioperative insulin resistance and exacerbation of hyperglycemia.  
Control blood glucose with titration of insulin infusion  
  
10/5/2011  
adequate control  
goal FBG<180  
  
   
   
                                Post-op pain    10/04/2011      10/06/2011  
   
                                Mechanically assisted ventilation 10/04/2011      
  10/05/2011  
   
                                                      
  
  
Overview:Formatting of this note might be different from the original.  
10/4/2011  
optimize MV settings, WTE  
current setting:FiO2, 75%, peep 8  
   
   
                                Cardiac insufficiency 10/04/2011      10/05/2011  
   
                                                      
  
  
Overview:Formatting of this note might be different from the original.  
10/4/2011  
RV mild to moderate post pump  
goal CI>2.3  
   
   
                                Hypoxemia       10/04/2011      10/06/2011  
   
                                discharge planning 2011      10/19/2011  
   
                                                      
  
  
Overview:Formatting of this note might be different from the original.  
age 71,  lives in [place], [place]. No DC needs.  
/  
   
   
                                Pre-op testing  2011      10/04/2011  
   
                                                      
  
  
Overview:Formatting of this note is different from the original.  
Images from the original note were not included.  
[facility]  
PRE-OP CHECKLIST  
  
Surgeon: Deangelo Angulo M.D. Informed Consent Completed: No  
STS Score: 1.4%  
CAD: Yes - CAD on Problem List: Yes  
Is intended procedure a CABG: Yes - is a beta blocker ordered? Yes  
  
H & P completed: Yes  
  
PA/LAT: Completed CT: Completed MRI: N/A LE US: N/A  
  
Cath: Yes - reviewed: Yes Echo:Completed EKG: Completed EF %: 61  
  
PI's: N/A Carotid: Completed Mapping: N/A Dental: Completed PFT's: N/A  
  
Basename 11 0729  
WBC 7.18  
HB 13.1  
HCT 41.8  
  
INR 1.0  
CREAT 1.35  
  
UA: Normal  
HCG:N/A  
  
ABO/ABO Confirmed: Yes  
  
Blood ordered: No  
  
SA Swab: Yes - results: Pending  
  
Last Dose of Anticoagulation: vitamins  
  
Op Note: N/A  
  
Pacemaker Check: N/A  
  
Consults: GI 2001  
  
DM: No  
  
Cardiac Surgical prep: No  
  
SIGNATURE: Krystal Castellanos RN CHECKED BY:  
DATE of SERVICE: 2011  
TIME of SERVICE: 2:23 PM  
  
  
   
   
                                Anemia of chronic disease 2011  
   
                                Intestinal polyp 2011  
   
                                                      
  
  
Overview:Formatting of this note might be different from the original.  
Distal ileum ulcerated polyp.  
   
   
                                Nonspecific abnormal results of liver function s  
tudy 2006  
   
                                Impotence of organic origin 2006  
   
                                Obesity, Class III, BMI 40-49.9 (morbid obesity)  
                 2021  
  
documented as of this encounter (statuses as of 2023)  
[facility]06- History of Past illness Narrative*   
  
                                Problem         Noted Date      Resolved Date  
   
                                Medicare annual wellness visit, subsequent 2019  
   
                                Encounter for long-term (current) use of medicat  
ions 2018  
   
                                                      
  
  
Overview:Formatting of this note might be different from the original.  
Added automatically from request for surgery [id number]  
   
   
                                History of colonic polyps 2018  
   
                                                      
  
  
Overview:Formatting of this note might be different from the original.  
Added automatically from request for surgery [id number]  
   
   
                                Gastroesophageal reflux disease 2018  
   
                                                      
  
  
Overview:Formatting of this note might be different from the original.  
Added automatically from request for surgery [id number]  
   
   
                                Acute pulmonary edema 10/05/2011      10/06/2011  
   
                                                      
  
  
Overview:Formatting of this note might be different from the original.  
10/5/2011  
cardiogenic and noncardiogenic factors  
  
   
   
                                Atelectasis     10/05/2011      10/06/2011  
   
                                Hypotension     10/04/2011      10/06/2011  
   
                                                      
  
  
Overview:Formatting of this note might be different from the original.  
10/4/2011  
goal map 65-80  
   
   
                                Stress hyperglycemia 10/04/2011      10/06/2011  
   
                                                      
  
  
Overview:Formatting of this note might be different from the original.  
10/4/2011  
Perioperative insulin resistance and exacerbation of hyperglycemia.  
Control blood glucose with titration of insulin infusion  
  
10/5/2011  
adequate control  
goal FBG<180  
  
   
   
                                Post-op pain    10/04/2011      10/06/2011  
   
                                Mechanically assisted ventilation 10/04/2011      
  10/05/2011  
   
                                                      
  
  
Overview:Formatting of this note might be different from the original.  
10/4/2011  
optimize MV settings, WTE  
current setting:FiO2, 75%, peep 8  
   
   
                                Cardiac insufficiency 10/04/2011      10/05/2011  
   
                                                      
  
  
Overview:Formatting of this note might be different from the original.  
10/4/2011  
RV mild to moderate post pump  
goal CI>2.3  
   
   
                                Hypoxemia       10/04/2011      10/06/2011  
   
                                discharge planning 2011      10/19/2011  
   
                                                      
  
  
Overview:Formatting of this note might be different from the original.  
age 71,  lives in [place], [place]. No DC needs.  
/  
   
   
                                Pre-op testing  2011      10/04/2011  
   
                                                      
  
  
Overview:Formatting of this note is different from the original.  
Images from the original note were not included.  
[facility]  
PRE-OP CHECKLIST  
  
Surgeon: Deangelo Angulo M.D. Informed Consent Completed: No  
STS Score: 1.4%  
CAD: Yes - CAD on Problem List: Yes  
Is intended procedure a CABG: Yes - is a beta blocker ordered? Yes  
  
H & P completed: Yes  
  
PA/LAT: Completed CT: Completed MRI: N/A LE US: N/A  
  
Cath: Yes - reviewed: Yes Echo:Completed EKG: Completed EF %: 61  
  
PI's: N/A Carotid: Completed Mapping: N/A Dental: Completed PFT's: N/A  
  
Basename 11 0729  
WBC 7.18  
HB 13.1  
HCT 41.8  
  
INR 1.0  
CREAT 1.35  
  
UA: Normal  
HCG:N/A  
  
ABO/ABO Confirmed: Yes  
  
Blood ordered: No  
  
SA Swab: Yes - results: Pending  
  
Last Dose of Anticoagulation: vitamins  
  
Op Note: N/A  
  
Pacemaker Check: N/A  
  
Consults: GI 2001  
  
DM: No  
  
Cardiac Surgical prep: No  
  
SIGNATURE: Krystal Castellanos RN CHECKED BY:  
DATE of SERVICE: 2011  
TIME of SERVICE: 2:23 PM  
  
  
   
   
                                Anemia of chronic disease 2011  
   
                                Intestinal polyp 2011  
   
                                                      
  
  
Overview:Formatting of this note might be different from the original.  
Distal ileum ulcerated polyp.  
   
   
                                Nonspecific abnormal results of liver function s  
tudy 2006  
   
                                Impotence of organic origin 2006  
   
                                Obesity, Class III, BMI 40-49.9 (morbid obesity)  
                 2021  
  
documented as of this encounter (statuses as of 2023)  
[facility]- History of Past illness Narrative*   
  
                                Problem         Noted Date      Resolved Date  
   
                                Medicare annual wellness visit, subsequent 2019  
   
                                Encounter for long-term (current) use of medicat  
ions 2018  
   
                                                      
  
  
Overview:Formatting of this note might be different from the original.  
Added automatically from request for surgery [id number]  
   
   
                                History of colonic polyps 2018  
   
                                                      
  
  
Overview:Formatting of this note might be different from the original.  
Added automatically from request for surgery [id number]  
   
   
                                Gastroesophageal reflux disease 2018  
   
                                                      
  
  
Overview:Formatting of this note might be different from the original.  
Added automatically from request for surgery [id number]  
   
   
                                Acute pulmonary edema 10/05/2011      10/06/2011  
   
                                                      
  
  
Overview:Formatting of this note might be different from the original.  
10/5/2011  
cardiogenic and noncardiogenic factors  
  
   
   
                                Atelectasis     10/05/2011      10/06/2011  
   
                                Hypotension     10/04/2011      10/06/2011  
   
                                                      
  
  
Overview:Formatting of this note might be different from the original.  
10/4/2011  
goal map 65-80  
   
   
                                Stress hyperglycemia 10/04/2011      10/06/2011  
   
                                                      
  
  
Overview:Formatting of this note might be different from the original.  
10/4/2011  
Perioperative insulin resistance and exacerbation of hyperglycemia.  
Control blood glucose with titration of insulin infusion  
  
10/5/2011  
adequate control  
goal FBG<180  
  
   
   
                                Post-op pain    10/04/2011      10/06/2011  
   
                                Mechanically assisted ventilation 10/04/2011      
  10/05/2011  
   
                                                      
  
  
Overview:Formatting of this note might be different from the original.  
10/4/2011  
optimize MV settings, WTE  
current setting:FiO2, 75%, peep 8  
   
   
                                Cardiac insufficiency 10/04/2011      10/05/2011  
   
                                                      
  
  
Overview:Formatting of this note might be different from the original.  
10/4/2011  
RV mild to moderate post pump  
goal CI>2.3  
   
   
                                Hypoxemia       10/04/2011      10/06/2011  
   
                                discharge planning 2011      10/19/2011  
   
                                                      
  
  
Overview:Formatting of this note might be different from the original.  
age 71,  lives in [place], [place]. No DC needs.  
/  
   
   
                                Pre-op testing  2011      10/04/2011  
   
                                                      
  
  
Overview:Formatting of this note is different from the original.  
Images from the original note were not included.  
[facility]  
PRE-OP CHECKLIST  
  
Surgeon: Deangelo Angulo M.D. Informed Consent Completed: No  
STS Score: 1.4%  
CAD: Yes - CAD on Problem List: Yes  
Is intended procedure a CABG: Yes - is a beta blocker ordered? Yes  
  
H & P completed: Yes  
  
PA/LAT: Completed CT: Completed MRI: N/A LE US: N/A  
  
Cath: Yes - reviewed: Yes Echo:Completed EKG: Completed EF %: 61  
  
PI's: N/A Carotid: Completed Mapping: N/A Dental: Completed PFT's: N/A  
  
Basename 11 0729  
WBC 7.18  
HB 13.1  
HCT 41.8  
  
INR 1.0  
CREAT 1.35  
  
UA: Normal  
HCG:N/A  
  
ABO/ABO Confirmed: Yes  
  
Blood ordered: No  
  
SA Swab: Yes - results: Pending  
  
Last Dose of Anticoagulation: vitamins  
  
Op Note: N/A  
  
Pacemaker Check: N/A  
  
Consults: GI 2001  
  
DM: No  
  
Cardiac Surgical prep: No  
  
SIGNATURE: Krystal Castellanos RN CHECKED BY:  
DATE of SERVICE: 2011  
TIME of SERVICE: 2:23 PM  
  
  
   
   
                                Anemia of chronic disease 2011  
   
                                Intestinal polyp 2011  
   
                                                      
  
  
Overview:Formatting of this note might be different from the original.  
Distal ileum ulcerated polyp.  
   
   
                                Nonspecific abnormal results of liver function s  
tudy 2006  
   
                                Impotence of organic origin 2006  
   
                                Obesity, Class III, BMI 40-49.9 (morbid obesity)  
                 2021  
  
documented as of this encounter (statuses as of 2023)  
[facility]- History of Past illness Narrative*   
  
                                Problem         Noted Date      Resolved Date  
   
                                Medicare annual wellness visit, subsequent 2019  
   
                                Encounter for long-term (current) use of medicat  
ions 2018  
   
                                                      
  
  
Overview:Formatting of this note might be different from the original.  
Added automatically from request for surgery [id number]  
   
   
                                History of colonic polyps 2018  
   
                                                      
  
  
Overview:Formatting of this note might be different from the original.  
Added automatically from request for surgery [id number]  
   
   
                                Gastroesophageal reflux disease 2018  
   
                                                      
  
  
Overview:Formatting of this note might be different from the original.  
Added automatically from request for surgery [id number]  
   
   
                                Acute pulmonary edema 10/05/2011      10/06/2011  
   
                                                      
  
  
Overview:Formatting of this note might be different from the original.  
10/5/2011  
cardiogenic and noncardiogenic factors  
  
   
   
                                Atelectasis     10/05/2011      10/06/2011  
   
                                Hypotension     10/04/2011      10/06/2011  
   
                                                      
  
  
Overview:Formatting of this note might be different from the original.  
10/4/2011  
goal map 65-80  
   
   
                                Stress hyperglycemia 10/04/2011      10/06/2011  
   
                                                      
  
  
Overview:Formatting of this note might be different from the original.  
10/4/2011  
Perioperative insulin resistance and exacerbation of hyperglycemia.  
Control blood glucose with titration of insulin infusion  
  
10/5/2011  
adequate control  
goal FBG<180  
  
   
   
                                Post-op pain    10/04/2011      10/06/2011  
   
                                Mechanically assisted ventilation 10/04/2011      
  10/05/2011  
   
                                                      
  
  
Overview:Formatting of this note might be different from the original.  
10/4/2011  
optimize MV settings, WTE  
current setting:FiO2, 75%, peep 8  
   
   
                                Cardiac insufficiency 10/04/2011      10/05/2011  
   
                                                      
  
  
Overview:Formatting of this note might be different from the original.  
10/4/2011  
RV mild to moderate post pump  
goal CI>2.3  
   
   
                                Hypoxemia       10/04/2011      10/06/2011  
   
                                discharge planning 2011      10/19/2011  
   
                                                      
  
  
Overview:Formatting of this note might be different from the original.  
age 71,  lives in [place], [place]. No DC needs.  
/  
   
   
                                Pre-op testing  2011      10/04/2011  
   
                                                      
  
  
Overview:Formatting of this note is different from the original.  
Images from the original note were not included.  
[facility]  
PRE-OP CHECKLIST  
  
Surgeon: Deangelo Angulo M.D. Informed Consent Completed: No  
STS Score: 1.4%  
CAD: Yes - CAD on Problem List: Yes  
Is intended procedure a CABG: Yes - is a beta blocker ordered? Yes  
  
H & P completed: Yes  
  
PA/LAT: Completed CT: Completed MRI: N/A LE US: N/A  
  
Cath: Yes - reviewed: Yes Echo:Completed EKG: Completed EF %: 61  
  
PI's: N/A Carotid: Completed Mapping: N/A Dental: Completed PFT's: N/A  
  
Basename 11 0729  
WBC 7.18  
HB 13.1  
HCT 41.8  
  
INR 1.0  
CREAT 1.35  
  
UA: Normal  
HCG:N/A  
  
ABO/ABO Confirmed: Yes  
  
Blood ordered: No  
  
SA Swab: Yes - results: Pending  
  
Last Dose of Anticoagulation: vitamins  
  
Op Note: N/A  
  
Pacemaker Check: N/A  
  
Consults: GI 2001  
  
DM: No  
  
Cardiac Surgical prep: No  
  
SIGNATURE: Krystal Castellanos RN CHECKED BY:  
DATE of SERVICE: 2011  
TIME of SERVICE: 2:23 PM  
  
  
   
   
                                Anemia of chronic disease 2011  
   
                                Intestinal polyp 2011  
   
                                                      
  
  
Overview:Formatting of this note might be different from the original.  
Distal ileum ulcerated polyp.  
   
   
                                Nonspecific abnormal results of liver function s  
tudy 2006  
   
                                Impotence of organic origin 2006  
   
                                Obesity, Class III, BMI 40-49.9 (morbid obesity)  
                 2021  
  
documented as of this encounter (statuses as of 2023)  
[facility]- History of Past illness Narrative*   
  
                                Problem         Noted Date      Resolved Date  
   
                                Medicare annual wellness visit, subsequent 2019  
   
                                Encounter for long-term (current) use of medicat  
ions 2018  
   
                                                      
  
  
Overview:Formatting of this note might be different from the original.  
Added automatically from request for surgery [id number]  
   
   
                                History of colonic polyps 2018  
   
                                                      
  
  
Overview:Formatting of this note might be different from the original.  
Added automatically from request for surgery [id number]  
   
   
                                Gastroesophageal reflux disease 2018  
   
                                                      
  
  
Overview:Formatting of this note might be different from the original.  
Added automatically from request for surgery [id number]  
   
   
                                Acute pulmonary edema 10/05/2011      10/06/2011  
   
                                                      
  
  
Overview:Formatting of this note might be different from the original.  
10/5/2011  
cardiogenic and noncardiogenic factors  
  
   
   
                                Atelectasis     10/05/2011      10/06/2011  
   
                                Hypotension     10/04/2011      10/06/2011  
   
                                                      
  
  
Overview:Formatting of this note might be different from the original.  
10/4/2011  
goal map 65-80  
   
   
                                Stress hyperglycemia 10/04/2011      10/06/2011  
   
                                                      
  
  
Overview:Formatting of this note might be different from the original.  
10/4/2011  
Perioperative insulin resistance and exacerbation of hyperglycemia.  
Control blood glucose with titration of insulin infusion  
  
10/5/2011  
adequate control  
goal FBG<180  
  
   
   
                                Post-op pain    10/04/2011      10/06/2011  
   
                                Mechanically assisted ventilation 10/04/2011      
  10/05/2011  
   
                                                      
  
  
Overview:Formatting of this note might be different from the original.  
10/4/2011  
optimize MV settings, WTE  
current setting:FiO2, 75%, peep 8  
   
   
                                Cardiac insufficiency 10/04/2011      10/05/2011  
   
                                                      
  
  
Overview:Formatting of this note might be different from the original.  
10/4/2011  
RV mild to moderate post pump  
goal CI>2.3  
   
   
                                Hypoxemia       10/04/2011      10/06/2011  
   
                                discharge planning 2011      10/19/2011  
   
                                                      
  
  
Overview:Formatting of this note might be different from the original.  
age 71,  lives in [place], [place]. No DC needs.  
/  
   
   
                                Pre-op testing  2011      10/04/2011  
   
                                                      
  
  
Overview:Formatting of this note is different from the original.  
Images from the original note were not included.  
[facility]  
PRE-OP CHECKLIST  
  
Surgeon: Deangelo Angulo M.D. Informed Consent Completed: No  
STS Score: 1.4%  
CAD: Yes - CAD on Problem List: Yes  
Is intended procedure a CABG: Yes - is a beta blocker ordered? Yes  
  
H & P completed: Yes  
  
PA/LAT: Completed CT: Completed MRI: N/A LE US: N/A  
  
Cath: Yes - reviewed: Yes Echo:Completed EKG: Completed EF %: 61  
  
PI's: N/A Carotid: Completed Mapping: N/A Dental: Completed PFT's: N/A  
  
Basename 11 0729  
WBC 7.18  
HB 13.1  
HCT 41.8  
  
INR 1.0  
CREAT 1.35  
  
UA: Normal  
HCG:N/A  
  
ABO/ABO Confirmed: Yes  
  
Blood ordered: No  
  
SA Swab: Yes - results: Pending  
  
Last Dose of Anticoagulation: vitamins  
  
Op Note: N/A  
  
Pacemaker Check: N/A  
  
Consults: GI 2001  
  
DM: No  
  
Cardiac Surgical prep: No  
  
SIGNATURE: Krystal Castellanos RN CHECKED BY:  
DATE of SERVICE: 2011  
TIME of SERVICE: 2:23 PM  
  
  
   
   
                                Anemia of chronic disease 2011  
   
                                Intestinal polyp 2011  
   
                                                      
  
  
Overview:Formatting of this note might be different from the original.  
Distal ileum ulcerated polyp.  
   
   
                                Nonspecific abnormal results of liver function s  
pepe 2006  
   
                                Impotence of organic origin 2006  
   
                                Obesity, Class III, BMI 40-49.9 (morbid obesity)  
                 2021  
  
documented as of this encounter (statuses as of 2023)  
[facility]06- History of Past illness Narrative*   
  
                                Problem         Noted Date      Resolved Date  
   
                                Medicare annual wellness visit, subsequent 2019  
   
                                Encounter for long-term (current) use of medicat  
ions 2018  
   
                                                      
  
  
Overview:Formatting of this note might be different from the original.  
Added automatically from request for surgery [id number]  
   
   
                                History of colonic polyps 2018  
   
                                                      
  
  
Overview:Formatting of this note might be different from the original.  
Added automatically from request for surgery [id number]  
   
   
                                Gastroesophageal reflux disease 2018  
   
                                                      
  
  
Overview:Formatting of this note might be different from the original.  
Added automatically from request for surgery [id number]  
   
   
                                Acute pulmonary edema 10/05/2011      10/06/2011  
   
                                                      
  
  
Overview:Formatting of this note might be different from the original.  
10/5/2011  
cardiogenic and noncardiogenic factors  
  
   
   
                                Atelectasis     10/05/2011      10/06/2011  
   
                                Hypotension     10/04/2011      10/06/2011  
   
                                                      
  
  
Overview:Formatting of this note might be different from the original.  
10/4/2011  
goal map 65-80  
   
   
                                Stress hyperglycemia 10/04/2011      10/06/2011  
   
                                                      
  
  
Overview:Formatting of this note might be different from the original.  
10/4/2011  
Perioperative insulin resistance and exacerbation of hyperglycemia.  
Control blood glucose with titration of insulin infusion  
  
10/5/2011  
adequate control  
goal FBG<180  
  
   
   
                                Post-op pain    10/04/2011      10/06/2011  
   
                                Mechanically assisted ventilation 10/04/2011      
  10/05/2011  
   
                                                      
  
  
Overview:Formatting of this note might be different from the original.  
10/4/2011  
optimize MV settings, WTE  
current setting:FiO2, 75%, peep 8  
   
   
                                Cardiac insufficiency 10/04/2011      10/05/2011  
   
                                                      
  
  
Overview:Formatting of this note might be different from the original.  
10/4/2011  
RV mild to moderate post pump  
goal CI>2.3  
   
   
                                Hypoxemia       10/04/2011      10/06/2011  
   
                                discharge planning 2011      10/19/2011  
   
                                                      
  
  
Overview:Formatting of this note might be different from the original.  
age 71,  lives in [place], [place]. No DC needs.  
/  
   
   
                                Pre-op testing  2011      10/04/2011  
   
                                                      
  
  
Overview:Formatting of this note is different from the original.  
Images from the original note were not included.  
[facility]  
PRE-OP CHECKLIST  
  
Surgeon: Deangelo Angulo M.D. Informed Consent Completed: No  
STS Score: 1.4%  
CAD: Yes - CAD on Problem List: Yes  
Is intended procedure a CABG: Yes - is a beta blocker ordered? Yes  
  
H & P completed: Yes  
  
PA/LAT: Completed CT: Completed MRI: N/A LE US: N/A  
  
Cath: Yes - reviewed: Yes Echo:Completed EKG: Completed EF %: 61  
  
PI's: N/A Carotid: Completed Mapping: N/A Dental: Completed PFT's: N/A  
  
Basename 11 0729  
WBC 7.18  
HB 13.1  
HCT 41.8  
  
INR 1.0  
CREAT 1.35  
  
UA: Normal  
HCG:N/A  
  
ABO/ABO Confirmed: Yes  
  
Blood ordered: No  
  
SA Swab: Yes - results: Pending  
  
Last Dose of Anticoagulation: vitamins  
  
Op Note: N/A  
  
Pacemaker Check: N/A  
  
Consults: GI 2001  
  
DM: No  
  
Cardiac Surgical prep: No  
  
SIGNATURE: Krystal Castellanos RN CHECKED BY:  
DATE of SERVICE: 2011  
TIME of SERVICE: 2:23 PM  
  
  
   
   
                                Anemia of chronic disease 2011  
   
                                Intestinal polyp 2011  
   
                                                      
  
  
Overview:Formatting of this note might be different from the original.  
Distal ileum ulcerated polyp.  
   
   
                                Nonspecific abnormal results of liver function s  
nialldy 2006  
   
                                Impotence of organic origin 2006  
   
                                Obesity, Class III, BMI 40-49.9 (morbid obesity)  
                 2021  
  
documented as of this encounter (statuses as of 2023)  
[facility]06- History of Past illness Narrative*   
  
                                Problem         Noted Date      Resolved Date  
   
                                Medicare annual wellness visit, subsequent 2019  
   
                                Encounter for long-term (current) use of medicat  
ions 2018  
   
                                                      
  
  
Overview:Formatting of this note might be different from the original.  
Added automatically from request for surgery [id number]  
   
   
                                History of colonic polyps 2018  
   
                                                      
  
  
Overview:Formatting of this note might be different from the original.  
Added automatically from request for surgery [id number]  
   
   
                                Gastroesophageal reflux disease 2018  
   
                                                      
  
  
Overview:Formatting of this note might be different from the original.  
Added automatically from request for surgery [id number]  
   
   
                                Acute pulmonary edema 10/05/2011      10/06/2011  
   
                                                      
  
  
Overview:Formatting of this note might be different from the original.  
10/5/2011  
cardiogenic and noncardiogenic factors  
  
   
   
                                Atelectasis     10/05/2011      10/06/2011  
   
                                Hypotension     10/04/2011      10/06/2011  
   
                                                      
  
  
Overview:Formatting of this note might be different from the original.  
10/4/2011  
goal map 65-80  
   
   
                                Stress hyperglycemia 10/04/2011      10/06/2011  
   
                                                      
  
  
Overview:Formatting of this note might be different from the original.  
10/4/2011  
Perioperative insulin resistance and exacerbation of hyperglycemia.  
Control blood glucose with titration of insulin infusion  
  
10/5/2011  
adequate control  
goal FBG<180  
  
   
   
                                Post-op pain    10/04/2011      10/06/2011  
   
                                Mechanically assisted ventilation 10/04/2011      
  10/05/2011  
   
                                                      
  
  
Overview:Formatting of this note might be different from the original.  
10/4/2011  
optimize MV settings, WTE  
current setting:FiO2, 75%, peep 8  
   
   
                                Cardiac insufficiency 10/04/2011      10/05/2011  
   
                                                      
  
  
Overview:Formatting of this note might be different from the original.  
10/4/2011  
RV mild to moderate post pump  
goal CI>2.3  
   
   
                                Hypoxemia       10/04/2011      10/06/2011  
   
                                discharge planning 2011      10/19/2011  
   
                                                      
  
  
Overview:Formatting of this note might be different from the original.  
age 71,  lives in [place], [place]. No DC needs.  
/  
   
   
                                Pre-op testing  2011      10/04/2011  
   
                                                      
  
  
Overview:Formatting of this note is different from the original.  
Images from the original note were not included.  
[facility]  
PRE-OP CHECKLIST  
  
Surgeon: Deangelo Angulo M.D. Informed Consent Completed: No  
STS Score: 1.4%  
CAD: Yes - CAD on Problem List: Yes  
Is intended procedure a CABG: Yes - is a beta blocker ordered? Yes  
  
H & P completed: Yes  
  
PA/LAT: Completed CT: Completed MRI: N/A LE US: N/A  
  
Cath: Yes - reviewed: Yes Echo:Completed EKG: Completed EF %: 61  
  
PI's: N/A Carotid: Completed Mapping: N/A Dental: Completed PFT's: N/A  
  
Basename 11 0729  
WBC 7.18  
HB 13.1  
HCT 41.8  
  
INR 1.0  
CREAT 1.35  
  
UA: Normal  
HCG:N/A  
  
ABO/ABO Confirmed: Yes  
  
Blood ordered: No  
  
SA Swab: Yes - results: Pending  
  
Last Dose of Anticoagulation: vitamins  
  
Op Note: N/A  
  
Pacemaker Check: N/A  
  
Consults: GI 2001  
  
DM: No  
  
Cardiac Surgical prep: No  
  
SIGNATURE: Krystal Castellanos RN CHECKED BY:  
DATE of SERVICE: 2011  
TIME of SERVICE: 2:23 PM  
  
  
   
   
                                Anemia of chronic disease 2011  
   
                                Intestinal polyp 2011  
   
                                                      
  
  
Overview:Formatting of this note might be different from the original.  
Distal ileum ulcerated polyp.  
   
   
                                Nonspecific abnormal results of liver function s  
tudy 2006  
   
                                Impotence of organic origin 2006  
   
                                Obesity, Class III, BMI 40-49.9 (morbid obesity)  
                 2021  
  
documented as of this encounter (statuses as of 2023)  
[facility]- History of Past illness Narrative*   
  
                                Problem         Noted Date      Resolved Date  
   
                                Medicare annual wellness visit, subsequent 2019  
   
                                Encounter for long-term (current) use of medicat  
ions 2018  
   
                                                      
  
  
Overview:Formatting of this note might be different from the original.  
Added automatically from request for surgery [id number]  
   
   
                                History of colonic polyps 2018  
   
                                                      
  
  
Overview:Formatting of this note might be different from the original.  
Added automatically from request for surgery [id number]  
   
   
                                Gastroesophageal reflux disease 2018  
   
                                                      
  
  
Overview:Formatting of this note might be different from the original.  
Added automatically from request for surgery [id number]  
   
   
                                Acute pulmonary edema 10/05/2011      10/06/2011  
   
                                                      
  
  
Overview:Formatting of this note might be different from the original.  
10/5/2011  
cardiogenic and noncardiogenic factors  
  
   
   
                                Atelectasis     10/05/2011      10/06/2011  
   
                                Hypotension     10/04/2011      10/06/2011  
   
                                                      
  
  
Overview:Formatting of this note might be different from the original.  
10/4/2011  
goal map 65-80  
   
   
                                Stress hyperglycemia 10/04/2011      10/06/2011  
   
                                                      
  
  
Overview:Formatting of this note might be different from the original.  
10/4/2011  
Perioperative insulin resistance and exacerbation of hyperglycemia.  
Control blood glucose with titration of insulin infusion  
  
10/5/2011  
adequate control  
goal FBG<180  
  
   
   
                                Post-op pain    10/04/2011      10/06/2011  
   
                                Mechanically assisted ventilation 10/04/2011      
  10/05/2011  
   
                                                      
  
  
Overview:Formatting of this note might be different from the original.  
10/4/2011  
optimize MV settings, WTE  
current setting:FiO2, 75%, peep 8  
   
   
                                Cardiac insufficiency 10/04/2011      10/05/2011  
   
                                                      
  
  
Overview:Formatting of this note might be different from the original.  
10/4/2011  
RV mild to moderate post pump  
goal CI>2.3  
   
   
                                Hypoxemia       10/04/2011      10/06/2011  
   
                                discharge planning 2011      10/19/2011  
   
                                                      
  
  
Overview:Formatting of this note might be different from the original.  
age 71,  lives in [place], [place]. No DC needs.  
/  
   
   
                                Pre-op testing  2011      10/04/2011  
   
                                                      
  
  
Overview:Formatting of this note is different from the original.  
Images from the original note were not included.  
[facility]  
PRE-OP CHECKLIST  
  
Surgeon: Deangelo Angulo M.D. Informed Consent Completed: No  
STS Score: 1.4%  
CAD: Yes - CAD on Problem List: Yes  
Is intended procedure a CABG: Yes - is a beta blocker ordered? Yes  
  
H & P completed: Yes  
  
PA/LAT: Completed CT: Completed MRI: N/A LE US: N/A  
  
Cath: Yes - reviewed: Yes Echo:Completed EKG: Completed EF %: 61  
  
PI's: N/A Carotid: Completed Mapping: N/A Dental: Completed PFT's: N/A  
  
Basename 11 0729  
WBC 7.18  
HB 13.1  
HCT 41.8  
  
INR 1.0  
CREAT 1.35  
  
UA: Normal  
HCG:N/A  
  
ABO/ABO Confirmed: Yes  
  
Blood ordered: No  
  
SA Swab: Yes - results: Pending  
  
Last Dose of Anticoagulation: vitamins  
  
Op Note: N/A  
  
Pacemaker Check: N/A  
  
Consults: GI 2001  
  
DM: No  
  
Cardiac Surgical prep: No  
  
SIGNATURE: Krystal Castellanos RN CHECKED BY:  
DATE of SERVICE: 2011  
TIME of SERVICE: 2:23 PM  
  
  
   
   
                                Anemia of chronic disease 2011  
   
                                Intestinal polyp 2011  
   
                                                      
  
  
Overview:Formatting of this note might be different from the original.  
Distal ileum ulcerated polyp.  
   
   
                                Nonspecific abnormal results of liver function s  
pepe 2006  
   
                                Impotence of organic origin 2006  
   
                                Obesity, Class III, BMI 40-49.9 (morbid obesity)  
                 2021  
  
documented as of this encounter (statuses as of 2023)  
[facility]- History of Past illness Narrative*   
  
                          Problem      Noted Date   Diagnosed Date Resolved Date  
   
                          Medicare annual wellness visit, subsequent 2019  
   
                                                    Encounter for long-term (cur  
rent) use of   
medications         2018  
   
                                                      
  
  
Overview:Formatting of this note might be different from the original.  
Added automatically from request for surgery [id number]  
   
   
                          History of colonic polyps 2018  
   
                                                      
  
  
Overview:Formatting of this note might be different from the original.  
Added automatically from request for surgery [id number]  
   
   
                          Gastroesophageal reflux disease 2018  
   
                                                      
  
  
Overview:Formatting of this note might be different from the original.  
Added automatically from request for surgery [id number]  
   
   
                          Acute pulmonary edema 10/05/2011                10/06/  
2011  
   
                                                      
  
  
Overview:Formatting of this note might be different from the original.  
10/5/2011  
cardiogenic and noncardiogenic factors  
  
   
   
                          Atelectasis  10/05/2011                10/06/2011  
   
                          Hypotension  10/04/2011                10/06/2011  
   
                                                      
  
  
Overview:Formatting of this note might be different from the original.  
10/4/2011  
goal map 65-80  
   
   
                          Stress hyperglycemia 10/04/2011                10/06/2  
011  
   
                                                      
  
  
Overview:Formatting of this note might be different from the original.  
10/4/2011  
Perioperative insulin resistance and exacerbation of hyperglycemia.  
Control blood glucose with titration of insulin infusion  
  
10/5/2011  
adequate control  
goal FBG<180  
  
   
   
                          Post-op pain 10/04/2011                10/06/2011  
   
                          Mechanically assisted ventilation 10/04/2011            
      10/05/2011  
   
                                                      
  
  
Overview:Formatting of this note might be different from the original.  
10/4/2011  
optimize MV settings, WTE  
current setting:FiO2, 75%, peep 8  
   
   
                          Cardiac insufficiency 10/04/2011                10/05/  
2011  
   
                                                      
  
  
Overview:Formatting of this note might be different from the original.  
10/4/2011  
RV mild to moderate post pump  
goal CI>2.3  
   
   
                          Hypoxemia    10/04/2011                10/06/2011  
   
                          discharge planning 2011                10/19/201  
1  
   
                                                      
  
  
Overview:Formatting of this note might be different from the original.  
age 71,  lives in [place], [place]. No DC needs.  
/  
   
   
                          Pre-op testing 2011                10/04/2011  
   
                                                      
  
  
Overview:Formatting of this note is different from the original.  
Images from the original note were not included.  
[facility]  
PRE-OP CHECKLIST  
  
Surgeon: Deangelo Angulo M.D. Informed Consent Completed: No  
STS Score: 1.4%  
CAD: Yes - CAD on Problem List: Yes  
Is intended procedure a CABG: Yes - is a beta blocker ordered? Yes  
  
H & P completed: Yes  
  
PA/LAT: Completed CT: Completed MRI: N/A LE US: N/A  
  
Cath: Yes - reviewed: Yes Echo:Completed EKG: Completed EF %: 61  
  
PI's: N/A Carotid: Completed Mapping: N/A Dental: Completed PFT's: N/A  
  
Basename 11 0729  
WBC 7.18  
HB 13.1  
HCT 41.8  
  
INR 1.0  
CREAT 1.35  
  
UA: Normal  
HCG:N/A  
  
ABO/ABO Confirmed: Yes  
  
Blood ordered: No  
  
SA Swab: Yes - results: Pending  
  
Last Dose of Anticoagulation: vitamins  
  
Op Note: N/A  
  
Pacemaker Check: N/A  
  
Consults: GI 2001  
  
DM: No  
  
Cardiac Surgical prep: No  
  
SIGNATURE: Krystal Castellanos RN CHECKED BY:  
DATE of SERVICE: 2011  
TIME of SERVICE: 2:23 PM  
  
  
   
   
                          Anemia of chronic disease 2011  
   
                          Intestinal polyp 2011  
   
                                                      
  
  
Overview:Formatting of this note might be different from the original.  
Distal ileum ulcerated polyp.  
   
   
                                                    Nonspecific abnormal results  
 of liver   
function study      2006  
   
                          Impotence of organic origin 2006  
   
                                                    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  
9.9 (morbid   
obesity)                                                    2021  
  
documented as of this encounter (statuses as of 2023)  
[facility]- History of Past illness Narrative*   
  
                          Problem      Noted Date   Diagnosed Date Resolved Date  
   
                          Medicare annual wellness visit, subsequent 2019  
   
                                                    Encounter for long-term (cur  
rent) use of   
medications         2018  
   
                                                      
  
  
Overview:Formatting of this note might be different from the original.  
Added automatically from request for surgery [id number]  
   
   
                          History of colonic polyps 2018  
   
                                                      
  
  
Overview:Formatting of this note might be different from the original.  
Added automatically from request for surgery [id number]  
   
   
                          Gastroesophageal reflux disease 2018  
   
                                                      
  
  
Overview:Formatting of this note might be different from the original.  
Added automatically from request for surgery [id number]  
   
   
                          Acute pulmonary edema 10/05/2011                10/06/  
2011  
   
                                                      
  
  
Overview:Formatting of this note might be different from the original.  
10/5/2011  
cardiogenic and noncardiogenic factors  
  
   
   
                          Atelectasis  10/05/2011                10/06/2011  
   
                          Hypotension  10/04/2011                10/06/2011  
   
                                                      
  
  
Overview:Formatting of this note might be different from the original.  
10/4/2011  
goal map 65-80  
   
   
                          Stress hyperglycemia 10/04/2011                10/06/2  
011  
   
                                                      
  
  
Overview:Formatting of this note might be different from the original.  
10/4/2011  
Perioperative insulin resistance and exacerbation of hyperglycemia.  
Control blood glucose with titration of insulin infusion  
  
10/5/2011  
adequate control  
goal FBG<180  
  
   
   
                          Post-op pain 10/04/2011                10/06/2011  
   
                          Mechanically assisted ventilation 10/04/2011            
      10/05/2011  
   
                                                      
  
  
Overview:Formatting of this note might be different from the original.  
10/4/2011  
optimize MV settings, WTE  
current setting:FiO2, 75%, peep 8  
   
   
                          Cardiac insufficiency 10/04/2011                10/05/  
2011  
   
                                                      
  
  
Overview:Formatting of this note might be different from the original.  
10/4/2011  
RV mild to moderate post pump  
goal CI>2.3  
   
   
                          Hypoxemia    10/04/2011                10/06/2011  
   
                          discharge planning 2011                10/19/201  
1  
   
                                                      
  
  
Overview:Formatting of this note might be different from the original.  
age 71,  lives in [place], [place]. No DC needs.  
/  
   
   
                          Pre-op testing 2011                10/04/2011  
   
                                                      
  
  
Overview:Formatting of this note is different from the original.  
Images from the original note were not included.  
[facility]  
PRE-OP CHECKLIST  
  
Surgeon: Deangelo Angulo M.D. Informed Consent Completed: No  
STS Score: 1.4%  
CAD: Yes - CAD on Problem List: Yes  
Is intended procedure a CABG: Yes - is a beta blocker ordered? Yes  
  
H & P completed: Yes  
  
PA/LAT: Completed CT: Completed MRI: N/A LE US: N/A  
  
Cath: Yes - reviewed: Yes Echo:Completed EKG: Completed EF %: 61  
  
PI's: N/A Carotid: Completed Mapping: N/A Dental: Completed PFT's: N/A  
  
Basename 11 0729  
WBC 7.18  
HB 13.1  
HCT 41.8  
  
INR 1.0  
CREAT 1.35  
  
UA: Normal  
HCG:N/A  
  
ABO/ABO Confirmed: Yes  
  
Blood ordered: No  
  
SA Swab: Yes - results: Pending  
  
Last Dose of Anticoagulation: vitamins  
  
Op Note: N/A  
  
Pacemaker Check: N/A  
  
Consults: GI 2001  
  
DM: No  
  
Cardiac Surgical prep: No  
  
SIGNATURE: Krystal Castellanos RN CHECKED BY:  
DATE of SERVICE: 2011  
TIME of SERVICE: 2:23 PM  
  
  
   
   
                          Anemia of chronic disease 2011  
   
                          Intestinal polyp 2011  
   
                                                      
  
  
Overview:Formatting of this note might be different from the original.  
Distal ileum ulcerated polyp.  
   
   
                                                    Nonspecific abnormal results  
 of liver   
function study      2006  
   
                          Impotence of organic origin 2006  
   
                                                    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  
9.9 (morbid   
obesity)                                                    2021  
  
documented as of this encounter (statuses as of 2023)  
[facility]- History of Past illness Narrative*   
  
                          Problem      Noted Date   Diagnosed Date Resolved Date  
   
                          Medicare annual wellness visit, subsequent 2019  
   
                                                    Encounter for long-term (cur  
rent) use of   
medications         2018  
   
                                                      
  
  
Overview:Formatting of this note might be different from the original.  
Added automatically from request for surgery [id number]  
   
   
                          History of colonic polyps 2018  
   
                                                      
  
  
Overview:Formatting of this note might be different from the original.  
Added automatically from request for surgery [id number]  
   
   
                          Gastroesophageal reflux disease 2018  
   
                                                      
  
  
Overview:Formatting of this note might be different from the original.  
Added automatically from request for surgery [id number]  
   
   
                          Acute pulmonary edema 10/05/2011                10/06/  
2011  
   
                                                      
  
  
Overview:Formatting of this note might be different from the original.  
10/5/2011  
cardiogenic and noncardiogenic factors  
  
   
   
                          Atelectasis  10/05/2011                10/06/2011  
   
                          Hypotension  10/04/2011                10/06/2011  
   
                                                      
  
  
Overview:Formatting of this note might be different from the original.  
10/4/2011  
goal map 65-80  
   
   
                          Stress hyperglycemia 10/04/2011                10/06/2  
011  
   
                                                      
  
  
Overview:Formatting of this note might be different from the original.  
10/4/2011  
Perioperative insulin resistance and exacerbation of hyperglycemia.  
Control blood glucose with titration of insulin infusion  
  
10/5/2011  
adequate control  
goal FBG<180  
  
   
   
                          Post-op pain 10/04/2011                10/06/2011  
   
                          Mechanically assisted ventilation 10/04/2011            
      10/05/2011  
   
                                                      
  
  
Overview:Formatting of this note might be different from the original.  
10/4/2011  
optimize MV settings, WTE  
current setting:FiO2, 75%, peep 8  
   
   
                          Cardiac insufficiency 10/04/2011                10/05/  
2011  
   
                                                      
  
  
Overview:Formatting of this note might be different from the original.  
10/4/2011  
RV mild to moderate post pump  
goal CI>2.3  
   
   
                          Hypoxemia    10/04/2011                10/06/2011  
   
                          discharge planning 2011                10/19/201  
1  
   
                                                      
  
  
Overview:Formatting of this note might be different from the original.  
age 71,  lives in [place], [place]. No DC needs.  
/  
   
   
                          Pre-op testing 2011                10/04/2011  
   
                                                      
  
  
Overview:Formatting of this note is different from the original.  
Images from the original note were not included.  
[facility]  
PRE-OP CHECKLIST  
  
Surgeon: Deangelo Angulo M.D. Informed Consent Completed: No  
STS Score: 1.4%  
CAD: Yes - CAD on Problem List: Yes  
Is intended procedure a CABG: Yes - is a beta blocker ordered? Yes  
  
H & P completed: Yes  
  
PA/LAT: Completed CT: Completed MRI: N/A LE US: N/A  
  
Cath: Yes - reviewed: Yes Echo:Completed EKG: Completed EF %: 61  
  
PI's: N/A Carotid: Completed Mapping: N/A Dental: Completed PFT's: N/A  
  
Basename 11 0729  
WBC 7.18  
HB 13.1  
HCT 41.8  
  
INR 1.0  
CREAT 1.35  
  
UA: Normal  
HCG:N/A  
  
ABO/ABO Confirmed: Yes  
  
Blood ordered: No  
  
SA Swab: Yes - results: Pending  
  
Last Dose of Anticoagulation: vitamins  
  
Op Note: N/A  
  
Pacemaker Check: N/A  
  
Consults: GI 2001  
  
DM: No  
  
Cardiac Surgical prep: No  
  
SIGNATURE: Krystal Castellanos RN CHECKED BY:  
DATE of SERVICE: 2011  
TIME of SERVICE: 2:23 PM  
  
  
   
   
                          Anemia of chronic disease 2011  
   
                          Intestinal polyp 2011  
   
                                                      
  
  
Overview:Formatting of this note might be different from the original.  
Distal ileum ulcerated polyp.  
   
   
                                                    Nonspecific abnormal results  
 of liver   
function study      2006  
   
                          Impotence of organic origin 2006  
   
                                                    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  
9.9 (morbid   
obesity)                                                    2021  
  
documented as of this encounter (statuses as of 2023)  
[facility]*   
  
                                                    Diagnosis  
   
                                                      
  
  
Essential hypertension- Primary  
  
  
Unspecified essential hypertension  
  
documented in this encounter  
[facility] note*   
  
                                                    Diagnosis  
   
                                                      
  
  
Gastroesophageal reflux disease, unspecified whether esophagitis present  
   
                                                      
  
  
Essential hypertension  
  
  
Unspecified essential hypertension  
   
                                                      
  
  
Mixed hyperlipidemia  
   
                                                      
  
  
Aortic valve disorder  
  
  
Aortic valve disorders  
   
                                                      
  
  
Coronary artery disease involving native coronary artery of native heart without
   
angina pectoris  
  
documented in this encounter  
[facility] note*   
  
                                                    Diagnosis  
   
                                                      
  
  
Adenomatous polyp of colon, unspecified part of colon- Primary  
   
                                                      
  
  
Impaired fasting glucose  
   
                                                      
  
  
Stage 3 chronic kidney disease, unspecified whether stage 3a or 3b CKD (HCC)  
   
                                                      
  
  
Essential hypertension  
  
  
Unspecified essential hypertension  
   
                                                      
  
  
Anemia of chronic disease  
  
  
Anemia of other chronic disease  
   
                                                      
  
  
Thrombocytopenia (HCC)  
  
  
Thrombocytopenia, unspecified  
  
documented in this encounter  
[facility] note*   
  
                                                    Diagnosis  
   
                                                      
  
  
Peripheral polyneuropathy- Primary  
  
  
Unspecified hereditary and idiopathic peripheral neuropathy  
   
                                                      
  
  
Frequent falls  
  
  
Personal history of fall  
   
                                                      
  
  
Contusion of hip, unspecified laterality, subsequent encounter  
   
                                                      
  
  
Muscular weakness  
  
  
Muscle weakness (generalized)  
   
                                                      
  
  
Abnormality of gait  
  
documented in this encounter  
[facility] note*   
  
                                                    Diagnosis  
   
                                                      
  
  
Frequent falls- Primary  
  
  
Personal history of fall  
   
                                                      
  
  
Peripheral polyneuropathy  
  
  
Unspecified hereditary and idiopathic peripheral neuropathy  
  
documented in this encounter  
[facility] note*   
  
                                                    Diagnosis  
   
                                                      
  
  
Peripheral polyneuropathy- Primary  
  
  
Unspecified hereditary and idiopathic peripheral neuropathy  
   
                                                      
  
  
Abnormality of gait  
   
                                                      
  
  
Stage 3a chronic kidney disease (HCC)  
   
                                                      
  
  
Essential hypertension  
  
  
Unspecified essential hypertension  
  
documented in this encounter  
[facility] note*   
  
                                                    Diagnosis  
   
                                                      
  
  
Personal history of falling, presenting hazards to health- Primary  
  
  
Personal history of fall  
   
                                                      
  
  
Peripheral polyneuropathy  
  
  
Unspecified hereditary and idiopathic peripheral neuropathy  
   
                                                      
  
  
Abnormality of gait  
  
documented in this encounter  
[facility] note*   
  
                                                    Diagnosis  
   
                                                      
  
  
Monoclonal gammopathy- Primary  
  
  
Monoclonal paraproteinemia  
   
                                                      
  
  
Macrocytic anemia  
  
  
Unspecified deficiency anemia  
   
                                                      
  
  
Thrombocytopenia (HCC)  
  
  
Thrombocytopenia, unspecified  
  
documented in this encounter  
[facility]   
  
                                                    Diagnosis  
   
                                                      
  
  
Cirrhosis of liver without ascites, unspecified hepatic cirrhosis type (HCC)- 
Primary  
   
                                                      
  
  
Splenomegaly  
   
                                                      
  
  
Liver lesion  
  
  
Other specified disorders of liver  
   
                                                      
  
  
Metabolic syndrome  
  
  
Dysmetabolic Syndrome X  
   
                                                      
  
  
Lower extremity edema  
  
  
Edema  
   
                                                      
  
  
Healthcare maintenance  
  
  
Routine general medical examination at a health care facility  
  
documented in this encounter  
[facility]   
  
                                                    Diagnosis  
   
                                                      
  
  
Cirrhosis of liver without ascites, unspecified hepatic cirrhosis type (HCC)- 
Primary  
   
                                                      
  
  
Claustrophobia  
  
  
Other isolated or specific phobias  
  
documented in this encounter  
[facility]   
  
                                                    Diagnosis  
   
                                                      
  
  
Stasis dermatitis of both legs- Primary  
  
  
Varicose veins of lower extremities with inflammation  
   
                                                      
  
  
Local skin infection  
  
  
Unspecified local infection of skin and subcutaneous tissue  
   
                                                      
  
  
Urge incontinence of urine  
  
  
Urge incontinence  
   
                                                      
  
  
Peripheral polyneuropathy  
  
  
Unspecified hereditary and idiopathic peripheral neuropathy  
  
documented in this encounter  
[facility]   
  
                                                    Diagnosis  
   
                                                      
  
  
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  
  
  
Iron deficiency anemia secondary to blood loss (chronic)  
   
                                                      
  
  
Iron malabsorption  
  
  
Other specified intestinal malabsorption  
  
documented in this encounter  
[facility]   
  
                                                    Diagnosis  
   
                                                      
  
  
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  
  
  
Iron deficiency anemia secondary to blood loss (chronic)  
   
                                                      
  
  
Iron malabsorption  
  
  
Other specified intestinal malabsorption  
  
documented in this encounter  
[facility]   
  
                                                    Diagnosis  
   
                                                      
  
  
Iron deficiency anemia, unspecified iron deficiency anemia type- Primary  
   
                                                      
  
  
History of colonic polyps  
  
  
Personal history of colonic polyps  
   
                                                      
  
  
History of cirrhosis  
  
  
Personal history of other diseases of digestive system  
  
documented in this encounter  
[facility]   
  
                                                    Diagnosis  
   
                                                      
  
  
Cirrhosis of liver without ascites, unspecified hepatic cirrhosis type (HCC)- 
Primary  
   
                                                      
  
  
Liver mass  
  
  
Unspecified disorder of liver  
   
                                                      
  
  
Splenomegaly  
  
documented in this encounter  
[facility]   
  
                                                    Diagnosis  
   
                                                      
  
  
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  
  
  
Iron deficiency anemia secondary to blood loss (chronic)  
   
                                                      
  
  
Iron malabsorption  
  
  
Other specified intestinal malabsorption  
  
documented in this encounter  
[facility]   
  
                                                    Diagnosis  
   
                                                      
  
  
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  
  
  
Iron deficiency anemia secondary to blood loss (chronic)  
   
                                                      
  
  
Iron malabsorption  
  
  
Other specified intestinal malabsorption  
  
documented in this encounter  
[facility]   
  
                                                    Diagnosis  
   
                                                      
  
  
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  
  
  
Iron deficiency anemia secondary to blood loss (chronic)  
   
                                                      
  
  
Iron malabsorption  
  
  
Other specified intestinal malabsorption  
  
documented in this encounter  
[facility]   
  
                                                    Diagnosis  
   
                                                      
  
  
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  
  
  
Iron deficiency anemia secondary to blood loss (chronic)  
   
                                                      
  
  
Iron malabsorption  
  
  
Other specified intestinal malabsorption  
  
documented in this encounter  
[facility]   
  
                                                    Diagnosis  
   
                                                      
  
  
IgG monoclonal gammopathy- Primary  
  
  
Monoclonal paraproteinemia  
   
                                                      
  
  
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  
  
  
Iron deficiency anemia secondary to blood loss (chronic)  
   
                                                      
  
  
Iron malabsorption  
  
  
Other specified intestinal malabsorption  
   
                                                      
  
  
Liver mass  
  
  
Unspecified disorder of liver  
   
                                                      
  
  
Splenomegaly  
   
                                                      
  
  
Hypercalcemia  
  
documented in this encounter  
[facility]   
  
                                                    Diagnosis  
   
                                                      
  
  
MGUS (monoclonal gammopathy of unknown significance)- Primary  
  
  
Monoclonal paraproteinemia  
   
                                                      
  
  
IgG monoclonal gammopathy  
  
  
Monoclonal paraproteinemia  
   
                                                      
  
  
Thrombocytopenia (HCC)  
  
  
Thrombocytopenia, unspecified  
   
                                                      
  
  
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  
  
  
Iron deficiency anemia secondary to blood loss (chronic)  
   
                                                      
  
  
Hyperparathyroidism (HCC)  
  
  
Hyperparathyroidism, unspecified  
   
                                                      
  
  
Other cirrhosis of liver (HCC)  
  
documented in this encounter  
[facility]   
  
                                                    Diagnosis  
   
                                                      
  
  
Urge incontinence of urine  
  
  
Urge incontinence  
  
documented in this encounter  
[facility]   
  
                                                    Diagnosis  
   
                                                      
  
  
Venous stasis ulcer of right calf limited to breakdown of skin without varicose 
veins   
(HCC)- Primary  
   
                                                      
  
  
Cellulitis of right lower leg  
  
  
Cellulitis and abscess of leg, except foot  
  
documented in this encounter  
[facility]   
  
                                                    Diagnosis  
   
                                                      
  
  
Venous stasis ulcer of other part of right lower leg limited to breakdown of 
skin   
without varicose veins (HCC)- Primary  
   
                                                      
  
  
Rash  
  
  
Rash and other nonspecific skin eruption  
   
                                                      
  
  
Partial thickness burn of left hand, unspecified site of hand, initial encounter  
   
                                                      
  
  
Cirrhosis of liver without ascites, unspecified hepatic cirrhosis type (HCC)  
  
documented in this encounter  
[facility]   
  
                                                    Diagnosis  
   
                                                      
  
  
Cirrhosis of liver without ascites, unspecified hepatic cirrhosis type (HCC)- 
Primary  
   
                                                      
  
  
Hepatocellular carcinoma (HCC)  
  
  
Malignant neoplasm of liver, primary  
  
documented in this encounter  
[facility]   
  
                                                    Diagnosis  
   
                                                      
  
  
Cirrhosis of liver without ascites, unspecified hepatic cirrhosis type (HCC)  
   
                                                      
  
  
Liver mass  
  
  
Unspecified disorder of liver  
   
                                                      
  
  
Splenomegaly  
  
documented in this encounter  
[facility]   
  
                                                    Diagnosis  
   
                                                      
  
  
Hepatocellular carcinoma (HCC)- Primary  
  
  
Malignant neoplasm of liver, primary  
  
documented in this encounter  
[facility]   
  
                                                    Diagnosis  
   
                                                      
  
  
Hepatocellular carcinoma (HCC)- Primary  
  
  
Malignant neoplasm of liver, primary  
   
                                                      
  
  
Cirrhosis of liver without ascites, unspecified hepatic cirrhosis type (HCC)  
  
documented in this encounter  
[facility]   
  
                                                    Diagnosis  
   
                                                      
  
  
Cirrhosis of liver without ascites, unspecified hepatic cirrhosis type (HCC)- 
Primary  
  
documented in this encounter  
[facility]   
  
                                                    Diagnosis  
   
                                                      
  
  
Hepatocellular carcinoma (HCC)- Primary  
  
  
Malignant neoplasm of liver, primary  
  
documented in this encounter  
[facility]   
  
                                                    Diagnosis  
   
                                                      
  
  
Hepatocellular carcinoma (HCC)- Primary  
  
  
Malignant neoplasm of liver, primary  
  
documented in this encounter  
[facility]   
  
                                                    Diagnosis  
   
                                                      
  
  
Unspecified essential hypertension  
   
                                                      
  
  
Urge incontinence of urine  
  
  
Urge incontinence  
  
[facility]  
[facility]   
  
                                                    Diagnosis  
   
                                                      
  
  
Coronary artery disease involving native coronary artery of native heart without
   
angina pectoris  
   
                                                      
  
  
Urge incontinence of urine  
  
  
Urge incontinence  
  
documented in this encounter  
[facility]   
  
                                                    Diagnosis  
   
                                                      
  
  
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  
  
  
Iron deficiency anemia secondary to blood loss (chronic)  
   
                                                      
  
  
Macrocytic anemia  
  
  
Unspecified deficiency anemia  
   
                                                      
  
  
Hepatocellular carcinoma (HCC)  
  
  
Malignant neoplasm of liver, primary  
   
                                                      
  
  
Thrombocytopenia (HCC)  
  
  
Thrombocytopenia, unspecified  
   
                                                      
  
  
Hyperparathyroidism (HCC)  
  
  
Hyperparathyroidism, unspecified  
   
                                                      
  
  
MGUS (monoclonal gammopathy of unknown significance)  
  
  
Monoclonal paraproteinemia  
  
documented in this encounter  
[facility]   
  
                                                    Diagnosis  
   
                                                      
  
  
New onset atrial flutter (HCC)- Primary  
  
  
Atrial flutter  
   
                                                      
  
  
Coronary artery disease involving native coronary artery of native heart without
   
angina pectoris  
  
documented in this encounter  
[facility]   
  
                                                    Diagnosis  
   
                                                      
  
  
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  
  
  
Iron deficiency anemia secondary to blood loss (chronic)  
   
                                                      
  
  
Iron malabsorption  
  
  
Other specified intestinal malabsorption  
  
documented [facility] this encounter  
[facility]   
  
                                                    Diagnosis  
   
                                                      
  
  
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  
  
  
Iron deficiency anemia secondary to blood loss (chronic)  
   
                                                      
  
  
Iron malabsorption  
  
  
Other specified intestinal malabsorption  
  
documented [facility] this encounter  
[facility]   
  
                                                    Diagnosis  
   
                                                      
  
  
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  
  
  
Iron deficiency anemia secondary to blood loss (chronic)  
   
                                                      
  
  
Iron malabsorption  
  
  
Other specified intestinal malabsorption  
  
documented [facility] this encounter  
[facility]   
  
                                                    Diagnosis  
   
                                                      
  
  
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  
  
  
Iron deficiency anemia secondary to blood loss (chronic)  
   
                                                      
  
  
Iron malabsorption  
  
  
Other specified intestinal malabsorption  
  
documented [facility] this encounter  
[facility]   
  
                                                    Diagnosis  
   
                                                      
  
  
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  
  
  
Iron deficiency anemia secondary to blood loss (chronic)  
   
                                                      
  
  
Iron malabsorption  
  
  
Other specified intestinal malabsorption  
  
documented [facility] this encounter  
[facility]   
  
                                                    Diagnosis  
   
                                                      
  
  
Melena- Primary  
  
  
Blood in stool  
  
documented in this encounter  
[facility]   
  
                                                    Diagnosis  
   
                                                      
  
  
Monoclonal gammopathy  
  
  
Monoclonal paraproteinemia  
   
                                                      
  
  
Macrocytic anemia  
  
  
Unspecified deficiency anemia  
   
                                                      
  
  
Thrombocytopenia (HCC)  
  
  
Thrombocytopenia, unspecified  
  
documented [facility] this encounter  
[facility]   
  
                                                    Diagnosis  
   
                                                      
  
  
Liver disease  
  
  
Unspecified disorder of liver  
  
documented [facility] this encounter  
[facility]   
  
                                                    Diagnosis  
   
                                                      
  
  
Treatment not available- Primary  
  
  
Procedure not carried out for other reasons  
  
documented [facility] this encounter  
[facility]   
  
                                                    Diagnosis  
   
                                                      
  
  
Gastroesophageal reflux disease, unspecified whether esophagitis present  
   
                                                      
  
  
Anemia of chronic disease  
  
  
Anemia of other chronic disease  
  
documented in this encounter  
[facility]'s home Plan of care note* Visit Details  
  
                                                    Visit Type -PT SOC  
Discipline -Physical Therapy  
  
  
                                    Problems  
  
                    Problem   Description Start Date Status    Goals     Interve  
ntions  
   
                                                      
  
  
PT Learning   
Assessment  
  
  
Disciplines:  
  
  
PT                                                    
  
  
2022                                  
  
  
Active                                    
  
  
1 goal linked to   
scheduled/document  
ed intervention                           
  
  
1 goal   
intervention   
scheduled/document  
ed in this visit  
   
                                                      
  
  
Medication   
Education  
  
  
Disciplines:  
  
  
Skilled   
Services                                              
  
  
2022                                  
  
  
Active                                    
  
  
1 goal linked to   
scheduled/document  
ed intervention                           
  
  
1 goal   
intervention   
scheduled/document  
ed in this visit  
   
                                                      
  
  
Physician   
Specific   
Parameters  
  
  
Disciplines:  
  
  
Skilled   
Services                                              
  
  
2022                                  
  
  
Active                                    
  
  
1 goal linked to   
scheduled/document  
ed intervention                           
  
  
1 goal   
intervention   
scheduled/document  
ed in this visit  
   
                                                      
  
  
Risk for Falls  
  
  
Disciplines:  
  
  
Skilled   
Services                                              
  
  
2022                                  
  
  
Active                                    
  
  
1 goal linked to   
scheduled/document  
ed intervention                           
  
  
1 goal   
intervention   
scheduled/document  
ed in this visit  
   
                                                      
  
  
Advance   
Directives  
  
  
Disciplines:  
  
  
Skilled   
Services                                              
  
  
2022                                  
  
  
Resolved on   
2022                                  
  
  
1 goal linked to   
scheduled/document  
ed intervention                           
  
  
1 goal   
intervention   
scheduled/document  
ed in this visit  
  
  
                                      Goals  
  
                      Goal       Associated Problem Outcome    Goal Met?  Visit   
Notes  
   
                                                      
  
  
Demonstrate understanding of   
education  
  
  
Description:  
  
  
Patient and/or caregiver   
will understand educational   
instruction to be achieved   
by 10/1/22. .                             
  
  
PT Learning Assessment                    
  
                                          
  
  
No                                        
   
                                                      
  
  
Patient/caregiver will   
demonstrate ability to   
obtain, store, identify and   
administer ordered   
medications, keep accurate   
medication list in home, and   
adhere to medication   
schedule  
  
  
Description:  
  
  
Patient/caregiver will   
demonstrate ability to   
obtain, store, identify and   
administer ordered   
medications, keep accurate   
medication list in home, and   
adhere to medication   
schedule by 10/1/22.  
  
                                          
  
  
Medication Education                      
  
                                          
  
  
No                                        
   
                                                      
  
  
Patient to maintain   
parameters within   
physician-specified ranges   
throughout certification   
period                                    
  
  
Physician Specific   
Parameters                                
  
                                          
  
  
No                                        
   
                                                      
  
  
Manage Risk for falls  
  
  
Description:  
  
  
Patient/caregiver will   
verbalize knowledge of   
individualized fall   
prevention strategies by   
10/1/22. .                                
  
  
Risk for Falls                            
  
                                          
  
  
No                                        
   
                                                      
  
  
Patient/caregiver will make   
healthcare providers aware   
of and any changes to   
Advance Directives   
throughout certification   
period                                    
  
  
Advance Directives                        
  
  
Completed                                 
  
  
Yes                                       
  
  
                                  Interventions  
  
                                        Intervention        Associated   
Problem/Goal        Status              Variance            Visit Notes  
   
                                                      
  
  
Instruct and educate   
on knowledge deficits                   Problem:PT Learning   
Assessment  
Goal:Demonstrate   
understanding of   
education                                 
  
  
Completed                                 
  
                                          
  
  
patient verbalize   
and/or demonstrate   
understanding of   
physical therapy   
education including   
fall prevention   
strategies, home   
safety and home   
exercise program.  
  
  
  
  
  
Education methods   
include: verbal cues.  
  
  
  
  
  
Further education   
required to improve   
knowledge and   
compliance with fall   
prevention   
strategies, home   
safety, functional   
activity and home   
exercise program.  
   
                                                      
  
  
Medication Education  
  
  
Description:  
  
  
Evaluate/instruct   
patient/caregiver on   
obtaining, storing,   
identifying and   
administering ordered   
medications as well as   
keeping accurate   
medication list in the   
home and adhereing to   
medication schedule                     Problem:Medication   
Education  
Goal:Patient/caregive  
r will demonstrate   
ability to obtain,   
store, identify and   
administer ordered   
medications, keep   
accurate medication   
list in home, and   
adhere to medication   
schedule                                  
  
  
Completed                                 
  
                                          
  
  
Patient instructed on   
importance of keeping   
accurate medication   
list in home and   
adhering to   
medication schedule.  
   
                                                      
  
  
SPO2  
  
  
Description:  
  
  
Notify Dr. Ahuja   
if pulse ox is <92% at   
rest.                                   Problem:Physician   
Specific Parameters  
Goal:Patient to   
maintain parameters   
within   
physician-specified   
ranges throughout   
certification period                      
  
  
Completed                                 
  
                                          
   
                                                      
  
  
Instruct on individual   
fall risk factors and   
strategies to prevent   
falls and injuries   
caused by falls.                        Problem:Risk for   
Falls  
Goal:Manage Risk for   
falls                                     
  
  
Completed                                 
  
                                          
  
  
PT: Patient   
instructed on  
  
  
Eliminating   
Environmental   
Hazards: Keep   
pathways clear, Keep   
pets out of pathways,   
Remove unsafe rugs,   
Move furniture from   
pathways and Wear   
supportive shoes or   
non-skid socks  
  
  
Managing Impaired   
Functional Mobility:   
Use assistive   
device(s): front   
wheeled walker  
  
  
  
  
  
   
                                                      
  
  
Determine patient's   
Advance Directive   
Status  
  
  
Description:  
  
  
Patient does have   
advance directives.  
  
  
Patient's Advance   
Directives determined   
to be  
  
  
available in Home and   
[facility] DPOA   
and Living Will.  
  
  
  
  
  
  
  
  
  
  
                                        Problem:Advance   
Directives  
Goal:Patient/caregive  
r will make   
healthcare providers   
aware of and any   
changes to Advance   
Directives throughout   
certification period                      
  
  
Completed                                 
  
                                          
  
  
Discussed Advance   
Directives with   
Patient and/or   
Caregiver.  
  
  
Referred patient to   
Home Care handbook   
for further   
information on   
Healthcare DPOA &   
Living Will.  
  
documented in this encounter  
[facility] home Plan of care note* Visit Details  
  
                                                    Visit Type -PT ROUTINE  
Discipline -Physical Therapy  
  
  
                                    Problems  
  
                    Problem   Description Start Date Status    Goals     Interve  
ntions  
   
                                                      
  
  
PT Impaired   
muscle   
performance   
and/or ROM  
  
  
Disciplines:  
  
  
PT                                                    
  
  
2022                                  
  
  
Active                                    
  
  
1 goal linked to   
scheduled/document  
ed intervention                           
  
  
1 goal   
intervention   
scheduled/document  
ed in this visit  
   
                                                      
  
  
PT Impaired gait  
  
  
Disciplines:  
  
  
PT                                                    
  
  
2022                                  
  
  
Active                                    
  
  
1 goal linked to   
scheduled/document  
ed intervention                           
  
  
1 goal   
intervention   
scheduled/document  
ed in this visit  
   
                                                      
  
  
PT Learning   
Assessment  
  
  
Disciplines:  
  
  
PT                                                    
  
  
2022                                  
  
  
Active                                    
  
  
1 goal linked to   
scheduled/document  
ed intervention                           
  
  
1 goal   
intervention   
scheduled/document  
ed in this visit  
   
                                                      
  
  
Medication   
Education  
  
  
Disciplines:  
  
  
Skilled Services                                      
  
  
2022                                  
  
  
Active                                    
  
  
1 goal linked to   
scheduled/document  
ed intervention                           
  
  
1 goal   
intervention   
scheduled/document  
ed in this visit  
   
                                                      
  
  
Sepsis  
  
  
Disciplines:  
  
  
Skilled Services                                      
  
  
2022                                  
  
  
Active                                    
  
  
1 goal linked to   
scheduled/document  
ed intervention                           
  
  
1 goal   
intervention   
scheduled/document  
ed in this visit  
   
                                                      
  
  
Physician   
Specific   
Parameters  
  
  
Disciplines:  
  
  
Skilled Services                                      
  
  
2022                                  
  
  
Active                                    
  
  
1 goal linked to   
scheduled/document  
ed intervention                           
  
  
1 goal   
intervention   
scheduled/document  
ed in this visit  
   
                                                      
  
  
Risk for Falls  
  
  
Disciplines:  
  
  
Skilled Services                                      
  
  
2022                                  
  
  
Active                                    
  
  
1 goal linked to   
scheduled/document  
ed intervention                           
  
  
1 goal   
intervention   
scheduled/document  
ed in this visit  
  
  
                                      Goals  
  
                      Goal       Associated Problem Outcome    Goal Met?  Visit   
Notes  
   
                                                      
  
  
Improved Muscle Performance   
and/or ROM  
  
  
Description:  
  
  
LTG: Patient will demonstrate   
improved muscle performance   
to meet functional goals as   
evidenced by ability to   
tolerate 5 sit to stands in   
30 sec and tolerate 10 min of   
a standing activity, to be   
achieved by 10/1/22. .  
  
  
  
  
  
STG: Patient and/or caregiver   
will verbalize/demonstrate   
independence with home   
exercise program, to improve   
functional mobility, to be   
achieved by 9/10/22.  
  
  
  
  
  
LTG: Patient will demonstrate   
improved left hip active   
range of motion to 100* in   
standing degrees, to meet   
functional goals, to be   
achieved by 10/1/22. .                    
  
  
PT Impaired muscle   
performance and/or ROM                    
  
                                          
  
  
No                                        
   
                                                      
  
  
Improved Gait  
  
  
Description:  
  
  
  
  
  
LTG: Patient will demonstrate   
improved gait ability as   
evidenced by ambulation 150   
feet with rollator walker   
independently with AD, to   
return to safe household and   
community ambulation, in   
order to return to plf , to   
be achieved by 10/1/22. .  
  
  
  
  
                                          
  
  
PT Impaired gait                          
  
                                          
  
  
No                                        
   
                                                      
  
  
Demonstrate understanding of   
education  
  
  
Description:  
  
  
Patient and/or caregiver will   
understand educational   
instruction to be achieved by   
10/1/22. .                                
  
  
PT Learning Assessment                    
  
                                          
  
  
No                                        
   
                                                      
  
  
Patient/caregiver will   
demonstrate ability to   
obtain, store, identify and   
administer ordered   
medications, keep accurate   
medication list in home, and   
adhere to medication schedule  
  
  
Description:  
  
  
Patient/caregiver will   
demonstrate ability to   
obtain, store, identify and   
administer ordered   
medications, keep accurate   
medication list in home, and   
adhere to medication schedule   
by 10/1/22.  
  
                                          
  
  
Medication Education                      
  
                                          
  
  
No                                        
   
                                                      
  
  
Patient/caregiver will be   
able to identify and report   
symptoms of sepsis  
  
  
Description:  
  
  
Patient/caregiver will be   
able to identify   
signs/symptoms of sepsis   
infection and will verbalize   
actions to take if suspected   
by 10/1/22. .                             
  
  
Sepsis                                    
  
                                          
  
  
No                                        
   
                                                      
  
  
Patient to maintain   
parameters within   
physician-specified ranges   
throughout certification   
period                                    
  
  
Physician Specific   
Parameters                                
  
                                          
  
  
No                                        
   
                                                      
  
  
Manage Risk for falls  
  
  
Description:  
  
  
Patient/caregiver will   
verbalize knowledge of   
individualized fall   
prevention strategies by   
10/1/22. .                                
  
  
Risk for Falls                            
  
                                          
  
  
No                                        
  
  
                                  Interventions  
  
                                        Intervention        Associated   
Problem/Goal        Status              Variance            Visit Notes  
   
                                                      
  
  
Physical Therapy   
Therapeutic Exercises                   Problem:PT Impaired   
muscle performance   
and/or ROM  
Goal:Improved Muscle   
Performance and/or   
ROM                                       
  
  
Completed                                 
  
                                          
  
  
patient instructed on   
strengthening   
exercises including   
Supine : GS,QS,HS,   
HIPADD, HIP BD, HEEL   
SLIDES, SAQ, X 10  
  
  
  
  
  
SEATED: FAQ, HIP   
FLEX, ,HIP ADD, HIP   
ABD AP X10  
  
  
with verbal and   
tactile cues for   
TECHNIQUE .  
   
                                                      
  
  
Physical Therapy Gait   
Training                                Problem:PT Impaired   
gait  
Goal:Improved Gait                        
  
  
Completed                                 
  
                                          
  
  
Gait training and   
instruction to   
patient on safe   
ambulation with front   
wheeled walker for   
40' x2 feet with   
supervision, with   
verbal cues for   
corrections of gait   
deviations. Pt tends   
to stand more upright   
with his WW vs the   
rollator .  
   
                                                      
  
  
Instruct and educate   
on knowledge deficits                   Problem:PT Learning   
Assessment  
Goal:Demonstrate   
understanding of   
education                                 
  
  
Completed                                 
  
                                          
  
  
patient verbalize   
and/or demonstrate   
understanding of   
physical therapy   
education including   
fall prevention   
strategies and home   
safety.  
  
  
  
  
  
Education methods   
include: verbal cues.  
  
  
  
  
  
Further education   
required to improve   
knowledge and   
compliance with   
pulmonary disease   
management,   
nutrition, home   
safety, functional   
activity and home   
exercise program.  
   
                                                      
  
  
Medication Education  
  
  
Description:  
  
  
Evaluate/instruct   
patient/caregiver on   
obtaining, storing,   
identifying and   
administering ordered   
medications as well as   
keeping accurate   
medication list in the   
home and adhereing to   
medication schedule                     Problem:Medication   
Education  
Goal:Patient/caregive  
r will demonstrate   
ability to obtain,   
store, identify and   
administer ordered   
medications, keep   
accurate medication   
list in home, and   
adhere to medication   
schedule                                  
  
  
Completed                                 
  
                                          
  
  
Patient instructed on   
importance of keeping   
accurate medication   
list in home and   
adhering to   
medication schedule.  
   
                                                      
  
  
Risk of Sepsis  
  
  
Description:  
  
  
Patient is at risk for   
sepsis. Monitor   
closely for s/s of   
sepsis.                                 Problem:Sepsis  
Goal:Patient/caregive  
r will be able to   
identify and report   
symptoms of sepsis                        
  
  
Completed                                 
  
                                          
   
                                                      
  
  
SPO2  
  
  
Description:  
  
  
Notify Dr. Ahuja   
if pulse ox is <92% at   
rest.                                   Problem:Physician   
Specific Parameters  
Goal:Patient to   
maintain parameters   
within   
physician-specified   
ranges throughout   
certification period                      
  
  
Completed                                 
  
                                          
   
                                                      
  
  
Instruct on individual   
fall risk factors and   
strategies to prevent   
falls and injuries   
caused by falls.                        Problem:Risk for   
Falls  
Goal:Manage Risk for   
falls                                     
  
  
Completed                                 
  
                                          
  
  
PT: Patient   
instructed on  
  
  
Eliminating   
Environmental   
Hazards: Keep   
pathways clear, Keep   
pets out of pathways   
and Remove unsafe   
rugs  
  
  
Managing Impaired   
Functional Mobility:   
Use assistive   
device(s): front   
wheeled walker  
  
  
  
  
  
  
documented in this encounter  
[facility]  
  
                                                    Visit Type -PTA ROUTINE  
Discipline -Physical Therapy  
  
  
                                    Problems  
  
                    Problem   Description Start Date Status    Goals     Interve  
ntions  
   
                                                      
  
  
PT Impaired   
muscle   
performance   
and/or ROM  
  
  
Disciplines:  
  
  
PT                                                    
  
  
2022                                  
  
  
Active                                    
  
  
1 goal linked to   
scheduled/document  
ed intervention                           
  
  
1 goal   
intervention   
scheduled/document  
ed in this visit  
   
                                                      
  
  
PT Impaired gait  
  
  
Disciplines:  
  
  
PT                                                    
  
  
2022                                  
  
  
Active                                    
  
  
1 goal linked to   
scheduled/document  
ed intervention                           
  
  
1 goal   
intervention   
scheduled/document  
ed in this visit  
   
                                                      
  
  
PT Learning   
Assessment  
  
  
Disciplines:  
  
  
PT                                                    
  
  
2022                                  
  
  
Active                                    
  
  
1 goal linked to   
scheduled/document  
ed intervention                           
  
  
1 goal   
intervention   
scheduled/document  
ed in this visit  
   
                                                      
  
  
Medication   
Education  
  
  
Disciplines:  
  
  
Skilled Services                                      
  
  
2022                                  
  
  
Active                                    
  
  
1 goal linked to   
scheduled/document  
ed intervention                           
  
  
1 goal   
intervention   
scheduled/document  
ed in this visit  
   
                                                      
  
  
Sepsis  
  
  
Disciplines:  
  
  
Skilled Services                                      
  
  
2022                                  
  
  
Active                                    
  
  
1 goal linked to   
scheduled/document  
ed intervention                           
  
  
1 goal   
intervention   
scheduled/document  
ed in this visit  
   
                                                      
  
  
Physician   
Specific   
Parameters  
  
  
Disciplines:  
  
  
Skilled Services                                      
  
  
2022                                  
  
  
Active                                    
  
  
1 goal linked to   
scheduled/document  
ed intervention                           
  
  
1 goal   
intervention   
scheduled/document  
ed in this visit  
   
                                                      
  
  
Risk for Falls  
  
  
Disciplines:  
  
  
Skilled Services                                      
  
  
2022                                  
  
  
Active                                    
  
  
1 goal linked to   
scheduled/document  
ed intervention                           
  
  
1 goal   
intervention   
scheduled/document  
ed in this visit  
   
                                                      
  
  
Pain  
  
  
Disciplines:  
  
  
Skilled Services                                      
  
  
2022                                  
  
  
Active                                    
  
  
1 goal linked to   
scheduled/document  
ed intervention                           
  
  
1 goal   
intervention   
scheduled/document  
ed in this visit  
   
                                                      
  
  
Discharge  
  
  
Disciplines:  
  
  
Skilled Services                                      
  
  
2022                                  
  
  
Active                                    
  
  
1 goal linked to   
scheduled/document  
ed intervention                           
  
  
1 goal   
intervention   
scheduled/document  
ed in this visit  
  
  
                                      Goals  
  
                      Goal       Associated Problem Outcome    Goal Met?  Visit   
Notes  
   
                                                      
  
  
Improved Muscle Performance   
and/or ROM  
  
  
Description:  
  
  
LTG: Patient will demonstrate   
improved muscle performance   
to meet functional goals as   
evidenced by ability to   
tolerate 5 sit to stands in   
30 sec and tolerate 10 min of   
a standing activity, to be   
achieved by 10/1/22. .  
  
  
  
  
  
STG: Patient and/or caregiver   
will verbalize/demonstrate   
independence with home   
exercise program, to improve   
functional mobility, to be   
achieved by 9/10/22.  
  
  
  
  
  
LTG: Patient will demonstrate   
improved left hip active   
range of motion to 100* in   
standing degrees, to meet   
functional goals, to be   
achieved by 10/1/22. .                    
  
  
PT Impaired muscle   
performance and/or ROM                    
  
                                          
  
  
No                                        
   
                                                      
  
  
Improved Gait  
  
  
Description:  
  
  
  
  
  
LTG: Patient will demonstrate   
improved gait ability as   
evidenced by ambulation 150   
feet with rollator walker   
independently with AD, to   
return to safe household and   
community ambulation, in   
order to return to plf , to   
be achieved by 10/1/22. .  
  
  
  
  
                                          
  
  
PT Impaired gait                          
  
                                          
  
  
No                                        
   
                                                      
  
  
Demonstrate understanding of   
education  
  
  
Description:  
  
  
Patient and/or caregiver will   
understand educational   
instruction to be achieved by   
10/1/22. .                                
  
  
PT Learning Assessment                    
  
                                          
  
  
No                                        
   
                                                      
  
  
Patient/caregiver will   
demonstrate ability to   
obtain, store, identify and   
administer ordered   
medications, keep accurate   
medication list in home, and   
adhere to medication schedule  
  
  
Description:  
  
  
Patient/caregiver will   
demonstrate ability to   
obtain, store, identify and   
administer ordered   
medications, keep accurate   
medication list in home, and   
adhere to medication schedule   
by 10/1/22.  
  
                                          
  
  
Medication Education                      
  
                                          
  
  
No                                        
   
                                                      
  
  
Patient/caregiver will be   
able to identify and report   
symptoms of sepsis  
  
  
Description:  
  
  
Patient/caregiver will be   
able to identify   
signs/symptoms of sepsis   
infection and will verbalize   
actions to take if suspected   
by 10/1/22. .                             
  
  
Sepsis                                    
  
                                          
  
  
No                                        
   
                                                      
  
  
Patient to maintain   
parameters within   
physician-specified ranges   
throughout certification   
period                                    
  
  
Physician Specific   
Parameters                                
  
                                          
  
  
No                                        
   
                                                      
  
  
Manage Risk for falls  
  
  
Description:  
  
  
Patient/caregiver will   
verbalize knowledge of   
individualized fall   
prevention strategies by   
10/1/22. .                                
  
  
Risk for Falls                            
  
                                          
  
  
No                                        
   
                                                      
  
  
Manage Pain  
  
  
Description:  
  
  
Patient/caregiver will   
verbalize knowledge and   
understanding of appropriate   
techniques to control pain,   
including pain medication and   
non-pharmacological   
techniques. Patient will   
verbalize or demonstrate an   
acceptable level of pain as   
evidenced by a pain score of   
<5/10 and improvement in   
ability to perform activities   
of daily living to be   
achieved by 10/1/22. .                    
  
  
Pain                                      
  
                                          
  
  
No                                        
   
                                                      
  
  
Manage discharge planning  
  
  
Description:  
  
  
Patient/caregiver will   
verbalize understanding of   
ongoing discharge plan   
provided related to disease   
management, arrangements for   
outpatient and/or community   
services, obtaining   
medications, supplies, and   
DME, as needed throughout   
certification period.                     
  
  
Discharge                                 
  
                                          
  
  
No                                        
  
  
                                  Interventions  
  
                                        Intervention        Associated   
Problem/Goal        Status              Variance            Visit Notes  
   
                                                      
  
  
Physical Therapy   
Therapeutic Exercises                   Problem:PT Impaired   
muscle performance   
and/or ROM  
Goal:Improved Muscle   
Performance and/or   
ROM                                       
  
  
Completed                                 
  
                                          
  
  
patient instructed on   
strengthening   
exercises including   
supine: ankle pumps,   
quad sets, hip abd and   
adduction, saq and   
heel slides x's 10   
each. seated: laq. hip   
flexion and heel toe   
raises x's 10 each   
with verbal cues for   
correct form and pace.   
patient instructed to   
perform home exercise   
program twice a day   
which included above   
exercises.  
   
                                                      
  
  
Physical Therapy Gait   
Training                                Problem:PT Impaired   
gait  
Goal:Improved Gait                        
  
  
Completed                                 
  
                                          
  
  
Gait training and   
instruction to patient   
on safe ambulation   
with rollator walker   
for 2x's 40 feet with   
supervision, with   
verbal cues for   
corrections of gait   
deviations including   
staying closer to   
rollator.  
   
                                                      
  
  
Instruct and educate   
on knowledge deficits                   Problem:PT Learning   
Assessment  
Goal:Demonstrate   
understanding of   
education                                 
  
  
Completed                                 
  
                                          
  
  
patient verbalize   
and/or demonstrate   
understanding of   
physical therapy   
education including   
home exercise program.  
  
  
  
  
  
Education methods   
include: verbal cues   
and visual cues.  
  
  
  
  
  
Further education   
required to improve   
knowledge and   
compliance with home   
exercise program.  
   
                                                      
  
  
Medication Education  
  
  
Description:  
  
  
Evaluate/instruct   
patient/caregiver on   
obtaining, storing,   
identifying and   
administering ordered   
medications as well   
as keeping accurate   
medication list in   
the home and   
adhereing to   
medication schedule                     Problem:Medication   
Education  
Goal:Patient/caregive  
r will demonstrate   
ability to obtain,   
store, identify and   
administer ordered   
medications, keep   
accurate medication   
list in home, and   
adhere to medication   
schedule                                  
  
  
Completed                                 
  
                                          
  
  
Patient instructed on   
importance of keeping   
accurate medication   
list in home.  
   
                                                      
  
  
Risk of Sepsis  
  
  
Description:  
  
  
Patient is at risk   
for sepsis. Monitor   
closely for s/s of   
sepsis.                                 Problem:Sepsis  
Goal:Patient/caregive  
r will be able to   
identify and report   
symptoms of sepsis                        
  
  
Completed                                 
  
                                          
   
                                                      
  
  
SPO2  
  
  
Description:  
  
  
Notify Dr. Ahuja   
if pulse ox is <92%   
at rest.                                Problem:Physician   
Specific Parameters  
Goal:Patient to   
maintain parameters   
within   
physician-specified   
ranges throughout   
certification period                      
  
  
Completed                                 
  
                                          
   
                                                      
  
  
Instruct on   
individual fall risk   
factors and   
strategies to prevent   
falls and injuries   
caused by falls.                        Problem:Risk for   
Falls  
Goal:Manage Risk for   
falls                                     
  
  
Completed                                 
  
                                          
  
  
PT: Patient instructed   
on Managing Impaired   
Functional Mobility:   
Use assistive   
device(s): rollator   
walker  
  
  
   
                                                      
  
  
Instruct on pain and   
instruct on   
strategies to control   
pain                                    Problem:Pain  
Goal:Manage Pain                          
  
  
Completed                                 
  
                                          
  
  
patient instructed on   
techniques to control   
pain including   
Pharmacological   
measures and   
Non-Pharmacological   
measures; distraction.  
   
                                                      
  
  
Instruct on ongoing   
discharge plan                          Problem:Discharge  
Goal:Manage discharge   
planning                                  
  
  
Completed                                 
  
                                          
  
  
Ongoing Discharge   
plan: Discharge plan   
discussed with patient   
including frequency   
and duration for home   
PT and plan for   
transition to:   
caregiver assistance.  
  
  
  
documented in this encounter  
[facility]'s home Plan of care note* Visit Details  
  
                                                    Visit Type -PTA ROUTINE  
Discipline -Physical Therapy  
  
  
                                    Problems  
  
                    Problem   Description Start Date Status    Goals     Interve  
ntions  
   
                                                      
  
  
PT Impaired   
muscle   
performance   
and/or ROM  
  
  
Disciplines:  
  
  
PT                                                    
  
  
2022                                  
  
  
Active                                    
  
  
1 goal linked to   
scheduled/document  
ed intervention                           
  
  
1 goal   
intervention   
scheduled/document  
ed in this visit  
   
                                                      
  
  
PT Impaired gait  
  
  
Disciplines:  
  
  
PT                                                    
  
  
2022                                  
  
  
Active                                    
  
  
1 goal linked to   
scheduled/document  
ed intervention                           
  
  
1 goal   
intervention   
scheduled/document  
ed in this visit  
   
                                                      
  
  
PT Impaired   
balance  
  
  
Disciplines:  
  
  
PT                                                    
  
  
2022                                  
  
  
Active                                    
  
  
1 goal linked to   
scheduled/document  
ed intervention                           
  
  
1 goal   
intervention   
scheduled/document  
ed in this visit  
   
                                                      
  
  
PT Learning   
Assessment  
  
  
Disciplines:  
  
  
PT                                                    
  
  
2022                                  
  
  
Active                                    
  
  
1 goal linked to   
scheduled/document  
ed intervention                           
  
  
1 goal   
intervention   
scheduled/document  
ed in this visit  
   
                                                      
  
  
Medication   
Education  
  
  
Disciplines:  
  
  
Skilled Services                                      
  
  
2022                                  
  
  
Active                                    
  
  
1 goal linked to   
scheduled/document  
ed intervention                           
  
  
1 goal   
intervention   
scheduled/document  
ed in this visit  
   
                                                      
  
  
Sepsis  
  
  
Disciplines:  
  
  
Skilled Services                                      
  
  
2022                                  
  
  
Active                                    
  
  
1 goal linked to   
scheduled/document  
ed intervention                           
  
  
1 goal   
intervention   
scheduled/document  
ed in this visit  
   
                                                      
  
  
Physician   
Specific   
Parameters  
  
  
Disciplines:  
  
  
Skilled Services                                      
  
  
2022                                  
  
  
Active                                    
  
  
1 goal linked to   
scheduled/document  
ed intervention                           
  
  
1 goal   
intervention   
scheduled/document  
ed in this visit  
   
                                                      
  
  
Risk for Falls  
  
  
Disciplines:  
  
  
Skilled Services                                      
  
  
2022                                  
  
  
Active                                    
  
  
1 goal linked to   
scheduled/document  
ed intervention                           
  
  
1 goal   
intervention   
scheduled/document  
ed in this visit  
   
                                                      
  
  
Discharge  
  
  
Disciplines:  
  
  
Skilled Services                                      
  
  
2022                                  
  
  
Active                                    
  
  
1 goal linked to   
scheduled/document  
ed intervention                           
  
  
1 goal   
intervention   
scheduled/document  
ed in this visit  
  
  
                                      Goals  
  
                      Goal       Associated Problem Outcome    Goal Met?  Visit   
Notes  
   
                                                      
  
  
Improved Muscle Performance   
and/or ROM  
  
  
Description:  
  
  
LTG: Patient will demonstrate   
improved muscle performance   
to meet functional goals as   
evidenced by ability to   
tolerate 5 sit to stands in   
30 sec and tolerate 10 min of   
a standing activity, to be   
achieved by 10/1/22. .  
  
  
  
  
  
STG: Patient and/or caregiver   
will verbalize/demonstrate   
independence with home   
exercise program, to improve   
functional mobility, to be   
achieved by 9/10/22.  
  
  
  
  
  
LTG: Patient will demonstrate   
improved left hip active   
range of motion to 100* in   
standing degrees, to meet   
functional goals, to be   
achieved by 10/1/22. .                    
  
  
PT Impaired muscle   
performance and/or ROM                    
  
                                          
  
  
No                                        
   
                                                      
  
  
Improved Gait  
  
  
Description:  
  
  
  
  
  
LTG: Patient will demonstrate   
improved gait ability as   
evidenced by ambulation 150   
feet with rollator walker   
independently with AD, to   
return to safe household and   
community ambulation, in   
order to return to plf , to   
be achieved by 10/1/22. .  
  
  
  
  
                                          
  
  
PT Impaired gait                          
  
                                          
  
  
No                                        
   
                                                      
  
  
Improved Balance  
  
  
Description:  
  
  
  
  
  
LTG: Patient will demonstrate   
improved standing balance to   
meet functional goals as   
evidenced by TUG score of <28   
to be achieved by 10/1/22. .              
  
  
PT Impaired balance                       
  
                                          
  
  
No                                        
   
                                                      
  
  
Demonstrate understanding of   
education  
  
  
Description:  
  
  
Patient and/or caregiver will   
understand educational   
instruction to be achieved by   
10/1/22. .                                
  
  
PT Learning Assessment                    
  
                                          
  
  
No                                        
   
                                                      
  
  
Patient/caregiver will   
demonstrate ability to   
obtain, store, identify and   
administer ordered   
medications, keep accurate   
medication list in home, and   
adhere to medication schedule  
  
  
Description:  
  
  
Patient/caregiver will   
demonstrate ability to   
obtain, store, identify and   
administer ordered   
medications, keep accurate   
medication list in home, and   
adhere to medication schedule   
by 10/1/22.  
  
                                          
  
  
Medication Education                      
  
                                          
  
  
No                                        
   
                                                      
  
  
Patient/caregiver will be   
able to identify and report   
symptoms of sepsis  
  
  
Description:  
  
  
Patient/caregiver will be   
able to identify   
signs/symptoms of sepsis   
infection and will verbalize   
actions to take if suspected   
by 10/1/22. .                             
  
  
Sepsis                                    
  
                                          
  
  
No                                        
   
                                                      
  
  
Patient to maintain   
parameters within   
physician-specified ranges   
throughout certification   
period                                    
  
  
Physician Specific   
Parameters                                
  
                                          
  
  
No                                        
   
                                                      
  
  
Manage Risk for falls  
  
  
Description:  
  
  
Patient/caregiver will   
verbalize knowledge of   
individualized fall   
prevention strategies by   
10/1/22. .                                
  
  
Risk for Falls                            
  
                                          
  
  
No                                        
   
                                                      
  
  
Manage discharge planning  
  
  
Description:  
  
  
Patient/caregiver will   
verbalize understanding of   
ongoing discharge plan   
provided related to disease   
management, arrangements for   
outpatient and/or community   
services, obtaining   
medications, supplies, and   
DME, as needed throughout   
certification period.                     
  
  
Discharge                                 
  
                                          
  
  
No                                        
  
  
                                  Interventions  
  
                                        Intervention        Associated   
Problem/Goal        Status              Variance            Visit Notes  
   
                                                      
  
  
Physical Therapy   
Therapeutic Exercises                   Problem:PT Impaired   
muscle performance   
and/or ROM  
Goal:Improved Muscle   
Performance and/or   
ROM                                       
  
  
Completed                                 
  
                                          
  
  
patient instructed on   
strengthening   
exercises including   
seated: laq, hip   
flexion and adduction   
and heel toe raies   
x's 10 each. standing   
: hamstring curls and   
hip abduction x's 10   
each with verbal,   
visual and written   
cues for correct form   
and pace. patient   
instructed to perform   
home exercise program   
twice a day which   
included above   
exercises.  
   
                                                      
  
  
Physical Therapy Gait   
Training                                Problem:PT Impaired   
gait  
Goal:Improved Gait                        
  
  
Completed                                 
  
                                          
  
  
Gait training and   
instruction to   
patient on safe   
ambulation with   
rollator walker for   
2x's 80 feet with   
supervision, with   
verbal cues for   
corrections of gait   
deviations including   
pacing for safety and   
energy conservation.  
   
                                                      
  
  
Physical Therapy   
Balance Training                        Problem:PT Impaired   
balance  
Goal:Improved Balance                     
  
  
Completed                                 
  
                                          
  
  
Developed,   
implemented, and   
instructed patient on   
standing balance   
exercises including   
lateral stepping at   
counter 5feet x's 2   
w/ David UE support on   
counter .  
   
                                                      
  
  
Instruct and educate   
on knowledge deficits                   Problem:PT Learning   
Assessment  
Goal:Demonstrate   
understanding of   
education                                 
  
  
Completed                                 
  
                                          
  
  
patient verbalize   
and/or demonstrate   
understanding of   
physical therapy   
education including   
home exercise   
program.  
  
  
  
  
  
Education methods   
include: verbal cues,   
written instructions   
and visual cues.  
  
  
  
  
  
Further education   
required to improve   
knowledge and   
compliance with home   
exercise program.  
   
                                                      
  
  
Medication Education  
  
  
Description:  
  
  
Evaluate/instruct   
patient/caregiver on   
obtaining, storing,   
identifying and   
administering ordered   
medications as well as   
keeping accurate   
medication list in the   
home and adhereing to   
medication schedule                     Problem:Medication   
Education  
Goal:Patient/caregive  
r will demonstrate   
ability to obtain,   
store, identify and   
administer ordered   
medications, keep   
accurate medication   
list in home, and   
adhere to medication   
schedule                                  
  
  
Completed                                 
  
                                          
  
  
Patient instructed on   
importance of keeping   
accurate medication   
list in home.  
   
                                                      
  
  
Risk of Sepsis  
  
  
Description:  
  
  
Patient is at risk for   
sepsis. Monitor   
closely for s/s of   
sepsis.                                 Problem:Sepsis  
Goal:Patient/caregive  
r will be able to   
identify and report   
symptoms of sepsis                        
  
  
Completed                                 
  
                                          
   
                                                      
  
  
SPO2  
  
  
Description:  
  
  
Notify Dr. Ahuja   
if pulse ox is <92% at   
rest.                                   Problem:Physician   
Specific Parameters  
Goal:Patient to   
maintain parameters   
within   
physician-specified   
ranges throughout   
certification period                      
  
  
Completed                                 
  
                                          
   
                                                      
  
  
Instruct on individual   
fall risk factors and   
strategies to prevent   
falls and injuries   
caused by falls.                        Problem:Risk for   
Falls  
Goal:Manage Risk for   
falls                                     
  
  
Completed                                 
  
                                          
  
  
PT: Patient   
instructed on   
Managing Impaired   
Functional Mobility:   
Use assistive   
device(s): rollator   
walker  
  
  
   
                                                      
  
  
Instruct on ongoing   
discharge plan                          Problem:Discharge  
Goal:Manage discharge   
planning                                  
  
  
Completed                                 
  
                                          
  
  
Ongoing Discharge   
plan: Discharge plan   
discussed with   
patient including   
frequency and   
duration for home PT   
and plan for   
transition to: live   
independently at home   
without ongoing   
services.  
  
  
  
documented in this encounter  
[facility] Details  
  
                                                    Visit Type -PT ROUTINE  
Discipline -Physical Therapy  
  
  
                                    Problems  
  
                    Problem   Description Start Date Status    Goals     Interve  
ntions  
   
                                                      
  
  
PT Impaired   
muscle   
performance   
and/or ROM  
  
  
Disciplines:  
  
  
PT                                                    
  
  
2022                                  
  
  
Active                                    
  
  
1 goal linked to   
scheduled/document  
ed intervention                           
  
  
1 goal   
intervention   
scheduled/document  
ed in this visit  
   
                                                      
  
  
PT Impaired   
mobility  
  
  
Disciplines:  
  
  
PT                                                    
  
  
2022                                  
  
  
Active                                    
  
  
1 goal linked to   
scheduled/document  
ed intervention                           
  
  
1 goal   
intervention   
scheduled/document  
ed in this visit  
   
                                                      
  
  
PT Impaired gait  
  
  
Disciplines:  
  
  
PT                                                    
  
  
2022                                  
  
  
Active                                    
  
  
1 goal linked to   
scheduled/document  
ed intervention                           
  
  
1 goal   
intervention   
scheduled/document  
ed in this visit  
   
                                                      
  
  
PT Learning   
Assessment  
  
  
Disciplines:  
  
  
PT                                                    
  
  
2022                                  
  
  
Active                                    
  
  
1 goal linked to   
scheduled/document  
ed intervention                           
  
  
1 goal   
intervention   
scheduled/document  
ed in this visit  
   
                                                      
  
  
Medication   
Education  
  
  
Disciplines:  
  
  
Skilled Services                                      
  
  
2022                                  
  
  
Active                                    
  
  
1 goal linked to   
scheduled/document  
ed intervention                           
  
  
1 goal   
intervention   
scheduled/document  
ed in this visit  
   
                                                      
  
  
Sepsis  
  
  
Disciplines:  
  
  
Skilled Services                                      
  
  
2022                                  
  
  
Active                                    
  
  
1 goal linked to   
scheduled/document  
ed intervention                           
  
  
1 goal   
intervention   
scheduled/document  
ed in this visit  
   
                                                      
  
  
Physician   
Specific   
Parameters  
  
  
Disciplines:  
  
  
Skilled Services                                      
  
  
2022                                  
  
  
Active                                    
  
  
1 goal linked to   
scheduled/document  
ed intervention                           
  
  
1 goal   
intervention   
scheduled/document  
ed in this visit  
   
                                                      
  
  
Risk for Falls  
  
  
Disciplines:  
  
  
Skilled Services                                      
  
  
2022                                  
  
  
Active                                    
  
  
1 goal linked to   
scheduled/document  
ed intervention                           
  
  
1 goal   
intervention   
scheduled/document  
ed in this visit  
   
                                                      
  
  
Pain  
  
  
Disciplines:  
  
  
Skilled Services                                      
  
  
2022                                  
  
  
Active                                    
  
  
1 goal linked to   
scheduled/document  
ed intervention                           
  
  
1 goal   
intervention   
scheduled/document  
ed in this visit  
  
  
                                      Goals  
  
                      Goal       Associated Problem Outcome    Goal Met?  Visit   
Notes  
   
                                                      
  
  
Improved Muscle Performance   
and/or ROM  
  
  
Description:  
  
  
LTG: Patient will demonstrate   
improved muscle performance   
to meet functional goals as   
evidenced by ability to   
tolerate 5 sit to stands in   
30 sec and tolerate 10 min of   
a standing activity, to be   
achieved by 10/1/22. .  
  
  
  
  
  
STG: Patient and/or caregiver   
will verbalize/demonstrate   
independence with home   
exercise program, to improve   
functional mobility, to be   
achieved by 9/10/22.  
  
  
  
  
  
LTG: Patient will demonstrate   
improved left hip active   
range of motion to 100* in   
standing degrees, to meet   
functional goals, to be   
achieved by 10/1/22. .                    
  
  
PT Impaired muscle   
performance and/or ROM                    
  
                                          
  
  
No                                        
   
                                                      
  
  
Improved Transfers  
  
  
Description:  
  
  
  
  
  
  
  
  
LTG: Patient will demonstrate   
safe transfers to/from bed,   
chair, toilet, shower/tub and   
car independently with AD, to   
be achieved by 10/1/22. .  
  
                                          
  
  
PT Impaired mobility                      
  
                                          
  
  
No                                        
   
                                                      
  
  
Improved Gait  
  
  
Description:  
  
  
  
  
  
LTG: Patient will demonstrate   
improved gait ability as   
evidenced by ambulation 150   
feet with rollator walker   
independently with AD, to   
return to safe household and   
community ambulation, in   
order to return to plf , to   
be achieved by 10/1/22. .  
  
  
  
  
                                          
  
  
PT Impaired gait                          
  
                                          
  
  
No                                        
   
                                                      
  
  
Demonstrate understanding of   
education  
  
  
Description:  
  
  
Patient and/or caregiver will   
understand educational   
instruction to be achieved by   
10/1/22. .                                
  
  
PT Learning Assessment                    
  
                                          
  
  
No                                        
   
                                                      
  
  
Patient/caregiver will   
demonstrate ability to   
obtain, store, identify and   
administer ordered   
medications, keep accurate   
medication list in home, and   
adhere to medication schedule  
  
  
Description:  
  
  
Patient/caregiver will   
demonstrate ability to   
obtain, store, identify and   
administer ordered   
medications, keep accurate   
medication list in home, and   
adhere to medication schedule   
by 10/1/22.  
  
                                          
  
  
Medication Education                      
  
                                          
  
  
No                                        
   
                                                      
  
  
Patient/caregiver will be   
able to identify and report   
symptoms of sepsis  
  
  
Description:  
  
  
Patient/caregiver will be   
able to identify   
signs/symptoms of sepsis   
infection and will verbalize   
actions to take if suspected   
by 10/1/22. .                             
  
  
Sepsis                                    
  
                                          
  
  
No                                        
   
                                                      
  
  
Patient to maintain   
parameters within   
physician-specified ranges   
throughout certification   
period                                    
  
  
Physician Specific   
Parameters                                
  
                                          
  
  
No                                        
   
                                                      
  
  
Manage Risk for falls  
  
  
Description:  
  
  
Patient/caregiver will   
verbalize knowledge of   
individualized fall   
prevention strategies by   
10/1/22. .                                
  
  
Risk for Falls                            
  
                                          
  
  
No                                        
   
                                                      
  
  
Manage Pain  
  
  
Description:  
  
  
Patient/caregiver will   
verbalize knowledge and   
understanding of appropriate   
techniques to control pain,   
including pain medication and   
non-pharmacological   
techniques. Patient will   
verbalize or demonstrate an   
acceptable level of pain as   
evidenced by a pain score of   
<5/10 and improvement in   
ability to perform activities   
of daily living to be   
achieved by 10/1/22. .                    
  
  
Pain                                      
  
                                          
  
  
No                                        
  
  
                                  Interventions  
  
                                        Intervention        Associated   
Problem/Goal        Status              Variance            Visit Notes  
   
                                                      
  
  
Physical Therapy   
Therapeutic Exercises                   Problem:PT Impaired   
muscle performance   
and/or ROM  
Goal:Improved Muscle   
Performance and/or   
ROM                                       
  
  
Completed                                 
  
                                          
  
  
patient instructed on   
strengthening   
exercises including   
Supine : GS,QS,HS,   
HIPADD, HIP BD, HEEL   
SLIDES, SAQ,BRIDGING X   
15  
  
  
SEATED: FAQ, HIP FLEX,   
,HIP ADD, HIP ABD AP   
X10 with blue T band  
  
  
with verbal and   
tactile cues for   
TECHNIQUE .  
  
  
   
                                                      
  
  
Physical Therapy   
Transfer Training                       Problem:PT Impaired   
mobility  
Goal:Improved   
Transfers                                 
  
  
Completed                                 
  
                                          
  
  
Transfer training and   
instruction to patient   
on safe transfers to   
and from bed and chair   
with supervision and   
verbal cues for   
technique  
   
                                                      
  
  
Physical Therapy Gait   
Training                                Problem:PT Impaired   
gait  
Goal:Improved Gait                        
  
  
Completed                                 
  
                                          
  
  
Gait training and   
instruction to patient   
on safe ambulation   
with front wheeled   
walker for 40' x 3   
feet with supervision,   
with verbal cues for   
corrections of gait   
deviations including   
picking up his feet   
and no shuffling .  
   
                                                      
  
  
Instruct and educate   
on knowledge deficits                   Problem:PT Learning   
Assessment  
Goal:Demonstrate   
understanding of   
education                                 
  
  
Completed                                 
  
                                          
  
  
patient verbalize   
and/or demonstrate   
understanding of   
physical therapy   
education including   
cardiac disease   
management, pulmonary   
disease management,   
pain management, fall   
prevention strategies,   
home safety and   
functional activity.  
  
  
  
  
  
Education methods   
include: verbal cues.  
  
  
  
  
  
Further education   
required to improve   
knowledge and   
compliance with fall   
prevention strategies,   
home safety,   
functional activity   
and home exercise   
program.  
   
                                                      
  
  
Medication Education  
  
  
Description:  
  
  
Evaluate/instruct   
patient/caregiver on   
obtaining, storing,   
identifying and   
administering ordered   
medications as well   
as keeping accurate   
medication list in   
the home and   
adhereing to   
medication schedule                     Problem:Medication   
Education  
Goal:Patient/caregive  
r will demonstrate   
ability to obtain,   
store, identify and   
administer ordered   
medications, keep   
accurate medication   
list in home, and   
adhere to medication   
schedule                                  
  
  
Completed                                 
  
                                          
  
  
Patient instructed on   
importance of keeping   
accurate medication   
list in home and   
adhering to medication   
schedule.  
   
                                                      
  
  
Risk of Sepsis  
  
  
Description:  
  
  
Patient is at risk   
for sepsis. Monitor   
closely for s/s of   
sepsis.                                 Problem:Sepsis  
Goal:Patient/caregive  
r will be able to   
identify and report   
symptoms of sepsis                        
  
  
Completed                                 
  
                                          
   
                                                      
  
  
SPO2  
  
  
Description:  
  
  
Notify Dr. Ahuja   
if pulse ox is <92%   
at rest.                                Problem:Physician   
Specific Parameters  
Goal:Patient to   
maintain parameters   
within   
physician-specified   
ranges throughout   
certification period                      
  
  
Completed                                 
  
                                          
   
                                                      
  
  
Instruct on   
individual fall risk   
factors and   
strategies to prevent   
falls and injuries   
caused by falls.                        Problem:Risk for   
Falls  
Goal:Manage Risk for   
falls                                     
  
  
Completed                                 
  
                                          
  
  
PT: Patient instructed   
on  
  
  
Eliminating   
Environmental Hazards:   
Keep pathways clear,   
Keep pets out of   
pathways, Remove   
unsafe rugs and Move   
furniture from   
pathways  
  
  
Managing Impaired   
Functional Mobility:   
Use assistive   
device(s): front   
wheeled walker  
  
  
Managing Pain  
  
  
  
  
  
   
                                                      
  
  
Instruct on pain and   
instruct on   
strategies to control   
pain                                    Problem:Pain  
Goal:Manage Pain                          
  
  
Completed                                 
  
                                          
  
  
patient instructed on   
techniques to control   
pain including   
Pharmacological   
measures and   
Non-Pharmacological   
measures; rest and   
positioning/elevation.  
  
documented in this encounter  
[facility] Visit Details  
  
                                                    Visit Type -PTA ROUTINE  
Discipline -Physical Therapy  
  
  
                                    Problems  
  
                    Problem   Description Start Date Status    Goals     Interve  
ntions  
   
                                                      
  
  
PT Impaired   
muscle   
performance   
and/or ROM  
  
  
Disciplines:  
  
  
PT                                                    
  
  
2022                                  
  
  
Active                                    
  
  
1 goal linked to   
scheduled/document  
ed intervention                           
  
  
1 goal   
intervention   
scheduled/document  
ed in this visit  
   
                                                      
  
  
PT Impaired   
mobility  
  
  
Disciplines:  
  
  
PT                                                    
  
  
2022                                  
  
  
Active                                    
  
  
1 goal linked to   
scheduled/document  
ed intervention                           
  
  
1 goal   
intervention   
scheduled/document  
ed in this visit  
   
                                                      
  
  
PT Impaired gait  
  
  
Disciplines:  
  
  
PT                                                    
  
  
2022                                  
  
  
Active                                    
  
  
1 goal linked to   
scheduled/document  
ed intervention                           
  
  
1 goal   
intervention   
scheduled/document  
ed in this visit  
   
                                                      
  
  
PT Impaired   
balance  
  
  
Disciplines:  
  
  
PT                                                    
  
  
2022                                  
  
  
Active                                    
  
  
1 goal linked to   
scheduled/document  
ed intervention                           
  
  
1 goal   
intervention   
scheduled/document  
ed in this visit  
   
                                                      
  
  
PT Learning   
Assessment  
  
  
Disciplines:  
  
  
PT                                                    
  
  
2022                                  
  
  
Active                                    
  
  
1 goal linked to   
scheduled/document  
ed intervention                           
  
  
1 goal   
intervention   
scheduled/document  
ed in this visit  
   
                                                      
  
  
Medication   
Education  
  
  
Disciplines:  
  
  
Skilled Services                                      
  
  
2022                                  
  
  
Active                                    
  
  
1 goal linked to   
scheduled/document  
ed intervention                           
  
  
1 goal   
intervention   
scheduled/document  
ed in this visit  
   
                                                      
  
  
Sepsis  
  
  
Disciplines:  
  
  
Skilled Services                                      
  
  
2022                                  
  
  
Active                                    
  
  
1 goal linked to   
scheduled/document  
ed intervention                           
  
  
1 goal   
intervention   
scheduled/document  
ed in this visit  
   
                                                      
  
  
Physician   
Specific   
Parameters  
  
  
Disciplines:  
  
  
Skilled Services                                      
  
  
2022                                  
  
  
Active                                    
  
  
1 goal linked to   
scheduled/document  
ed intervention                           
  
  
1 goal   
intervention   
scheduled/document  
ed in this visit  
   
                                                      
  
  
Risk for Falls  
  
  
Disciplines:  
  
  
Skilled Services                                      
  
  
2022                                  
  
  
Active                                    
  
  
1 goal linked to   
scheduled/document  
ed intervention                           
  
  
1 goal   
intervention   
scheduled/document  
ed in this visit  
   
                                                      
  
  
Pain  
  
  
Disciplines:  
  
  
Skilled Services                                      
  
  
2022                                  
  
  
Active                                    
  
  
1 goal linked to   
scheduled/document  
ed intervention                           
  
  
1 goal   
intervention   
scheduled/document  
ed in this visit  
   
                                                      
  
  
Discharge  
  
  
Disciplines:  
  
  
Skilled Services                                      
  
  
2022                                  
  
  
Active                                    
  
  
1 goal linked to   
scheduled/document  
ed intervention                           
  
  
1 goal   
intervention   
scheduled/document  
ed in this visit  
  
  
                                      Goals  
  
                      Goal       Associated Problem Outcome    Goal Met?  Visit   
Notes  
   
                                                      
  
  
Improved Muscle Performance   
and/or ROM  
  
  
Description:  
  
  
LTG: Patient will demonstrate   
improved muscle performance   
to meet functional goals as   
evidenced by ability to   
tolerate 5 sit to stands in   
30 sec and tolerate 10 min of   
a standing activity, to be   
achieved by 10/1/22. .  
  
  
  
  
  
STG: Patient and/or caregiver   
will verbalize/demonstrate   
independence with home   
exercise program, to improve   
functional mobility, to be   
achieved by 9/10/22.  
  
  
  
  
  
LTG: Patient will demonstrate   
improved left hip active   
range of motion to 100* in   
standing degrees, to meet   
functional goals, to be   
achieved by 10/1/22. .                    
  
  
PT Impaired muscle   
performance and/or ROM                    
  
                                          
  
  
No                                        
   
                                                      
  
  
Improved Transfers  
  
  
Description:  
  
  
  
  
  
  
  
  
LTG: Patient will demonstrate   
safe transfers to/from bed,   
chair, toilet, shower/tub and   
car independently with AD, to   
be achieved by 10/1/22. .  
  
                                          
  
  
PT Impaired mobility                      
  
                                          
  
  
No                                        
   
                                                      
  
  
Improved Gait  
  
  
Description:  
  
  
  
  
  
LTG: Patient will demonstrate   
improved gait ability as   
evidenced by ambulation 150   
feet with rollator walker   
independently with AD, to   
return to safe household and   
community ambulation, in   
order to return to plf , to   
be achieved by 10/1/22. .  
  
  
  
  
                                          
  
  
PT Impaired gait                          
  
                                          
  
  
No                                        
   
                                                      
  
  
Improved Balance  
  
  
Description:  
  
  
  
  
  
LTG: Patient will demonstrate   
improved standing balance to   
meet functional goals as   
evidenced by TUG score of <28   
to be achieved by 10/1/22. .              
  
  
PT Impaired balance                       
  
                                          
  
  
No                                        
   
                                                      
  
  
Demonstrate understanding of   
education  
  
  
Description:  
  
  
Patient and/or caregiver will   
understand educational   
instruction to be achieved by   
10/1/22. .                                
  
  
PT Learning Assessment                    
  
                                          
  
  
No                                        
   
                                                      
  
  
Patient/caregiver will   
demonstrate ability to   
obtain, store, identify and   
administer ordered   
medications, keep accurate   
medication list in home, and   
adhere to medication schedule  
  
  
Description:  
  
  
Patient/caregiver will   
demonstrate ability to   
obtain, store, identify and   
administer ordered   
medications, keep accurate   
medication list in home, and   
adhere to medication schedule   
by 10/1/22.  
  
                                          
  
  
Medication Education                      
  
                                          
  
  
No                                        
   
                                                      
  
  
Patient/caregiver will be   
able to identify and report   
symptoms of sepsis  
  
  
Description:  
  
  
Patient/caregiver will be   
able to identify   
signs/symptoms of sepsis   
infection and will verbalize   
actions to take if suspected   
by 10/1/22. .                             
  
  
Sepsis                                    
  
                                          
  
  
No                                        
   
                                                      
  
  
Patient to maintain   
parameters within   
physician-specified ranges   
throughout certification   
period                                    
  
  
Physician Specific   
Parameters                                
  
                                          
  
  
No                                        
   
                                                      
  
  
Manage Risk for falls  
  
  
Description:  
  
  
Patient/caregiver will   
verbalize knowledge of   
individualized fall   
prevention strategies by   
10/1/22. .                                
  
  
Risk for Falls                            
  
                                          
  
  
No                                        
   
                                                      
  
  
Manage Pain  
  
  
Description:  
  
  
Patient/caregiver will   
verbalize knowledge and   
understanding of appropriate   
techniques to control pain,   
including pain medication and   
non-pharmacological   
techniques. Patient will   
verbalize or demonstrate an   
acceptable level of pain as   
evidenced by a pain score of   
<5/10 and improvement in   
ability to perform activities   
of daily living to be   
achieved by 10/1/22. .                    
  
  
Pain                                      
  
                                          
  
  
No                                        
   
                                                      
  
  
Manage discharge planning  
  
  
Description:  
  
  
Patient/caregiver will   
verbalize understanding of   
ongoing discharge plan   
provided related to disease   
management, arrangements for   
outpatient and/or community   
services, obtaining   
medications, supplies, and   
DME, as needed throughout   
certification period.                     
  
  
Discharge                                 
  
                                          
  
  
No                                        
  
  
                                  Interventions  
  
                                        Intervention        Associated   
Problem/Goal        Status              Variance            Visit Notes  
   
                                                      
  
  
Physical Therapy   
Therapeutic Exercises                   Problem:PT Impaired   
muscle performance   
and/or ROM  
Goal:Improved Muscle   
Performance and/or   
ROM                                       
  
  
Completed                                 
  
                                          
  
  
patient instructed on   
strengthening   
exercises including   
seated laq, hip   
flexion and   
adduction, heel toe   
raises x's 10 each   
with verbal and   
visual cues for   
correct form. patient   
instructed to perform   
home exercise program   
daily which included   
above exercises.  
   
                                                      
  
  
Physical Therapy   
Transfer Training                       Problem:PT Impaired   
mobility  
Goal:Improved   
Transfers                                 
  
  
Completed                                 
  
                                          
  
  
Transfer training and   
instruction to   
patient on safe   
transfers to and from   
chair and chair lift   
with supervision and   
verbal cues for   
safety.  
   
                                                      
  
  
Physical Therapy Gait   
Training                                Problem:PT Impaired   
gait  
Goal:Improved Gait                        
  
  
Completed                                 
  
                                          
  
  
Gait training and   
instruction to   
patient on safe   
ambulation with   
rollator walker for   
2x's125 feet with   
stand by assist, with   
verbal cues for   
corrections of gait   
deviations including   
inceased step height   
and for staying   
closer to rollator. .  
   
                                                      
  
  
Physical Therapy   
Balance Training                        Problem:PT Impaired   
balance  
Goal:Improved Balance                     
  
  
Completed                                 
  
                                          
  
  
Developed,   
implemented, and   
instructed patient on   
standing balance   
exercises including .  
   
                                                      
  
  
Instruct and educate   
on knowledge deficits                   Problem:PT Learning   
Assessment  
Goal:Demonstrate   
understanding of   
education                                 
  
  
Completed                                 
  
                                          
  
  
patient verbalize   
and/or demonstrate   
understanding of   
physical therapy   
education including   
home exercise   
program.  
  
  
  
  
  
Education methods   
include: verbal cues.  
  
  
  
  
  
Further education   
required to improve   
knowledge and   
compliance with home   
exercise program.  
   
                                                      
  
  
Medication Education  
  
  
Description:  
  
  
Evaluate/instruct   
patient/caregiver on   
obtaining, storing,   
identifying and   
administering ordered   
medications as well as   
keeping accurate   
medication list in the   
home and adhereing to   
medication schedule                     Problem:Medication   
Education  
Goal:Patient/caregive  
r will demonstrate   
ability to obtain,   
store, identify and   
administer ordered   
medications, keep   
accurate medication   
list in home, and   
adhere to medication   
schedule                                  
  
  
Completed                                 
  
                                          
  
  
Patient instructed on   
importance of keeping   
accurate medication   
list in home.  
   
                                                      
  
  
Risk of Sepsis  
  
  
Description:  
  
  
Patient is at risk for   
sepsis. Monitor   
closely for s/s of   
sepsis.                                 Problem:Sepsis  
Goal:Patient/caregive  
r will be able to   
identify and report   
symptoms of sepsis                        
  
  
Completed                                 
  
                                          
   
                                                      
  
  
SPO2  
  
  
Description:  
  
  
Notify Dr. Ahuja   
if pulse ox is <92% at   
rest.                                   Problem:Physician   
Specific Parameters  
Goal:Patient to   
maintain parameters   
within   
physician-specified   
ranges throughout   
certification period                      
  
  
Completed                                 
  
                                          
   
                                                      
  
  
Instruct on individual   
fall risk factors and   
strategies to prevent   
falls and injuries   
caused by falls.                        Problem:Risk for   
Falls  
Goal:Manage Risk for   
falls                                     
  
  
Completed                                 
  
                                          
  
  
PT: Patient   
instructed on   
Managing Impaired   
Functional Mobility:   
Use assistive   
device(s): front   
wheeled walker  
  
  
   
                                                      
  
  
Instruct on pain and   
instruct on strategies   
to control pain                         Problem:Pain  
Goal:Manage Pain                          
  
  
Completed                                 
  
                                          
  
  
patient instructed on   
techniques to control   
pain including   
Non-Pharmacological   
measures;   
breathing/relaxation.  
   
                                                      
  
  
Instruct on ongoing   
discharge plan                          Problem:Discharge  
Goal:Manage discharge   
planning                                  
  
  
Completed                                 
  
                                          
  
  
Ongoing Discharge   
plan: Discharge plan   
discussed with   
patient including   
frequency and   
duration for home PT   
and plan for   
transition to: live   
independently at home   
without ongoing   
services.  
  
  
  
documented in this encounter  
[facility] Visit Details  
  
                                                    Visit Type -PT ROUTINE  
Discipline -Physical Therapy  
  
  
                                    Problems  
  
                    Problem   Description Start Date Status    Goals     Interve  
ntions  
   
                                                      
  
  
PT Impaired   
muscle   
performance   
and/or ROM  
  
  
Disciplines:  
  
  
PT                                                    
  
  
2022                                  
  
  
Active                                    
  
  
1 goal linked to   
scheduled/document  
ed intervention                           
  
  
1 goal   
intervention   
scheduled/document  
ed in this visit  
   
                                                      
  
  
PT Impaired   
mobility  
  
  
Disciplines:  
  
  
PT                                                    
  
  
2022                                  
  
  
Active                                    
  
  
1 goal linked to   
scheduled/document  
ed intervention                           
  
  
1 goal   
intervention   
scheduled/document  
ed in this visit  
   
                                                      
  
  
PT Impaired gait  
  
  
Disciplines:  
  
  
PT                                                    
  
  
2022                                  
  
  
Active                                    
  
  
1 goal linked to   
scheduled/document  
ed intervention                           
  
  
1 goal   
intervention   
scheduled/document  
ed in this visit  
   
                                                      
  
  
PT Impaired   
balance  
  
  
Disciplines:  
  
  
PT                                                    
  
  
2022                                  
  
  
Active                                    
  
  
1 goal linked to   
scheduled/document  
ed intervention                           
  
  
1 goal   
intervention   
scheduled/document  
ed in this visit  
   
                                                      
  
  
PT Learning   
Assessment  
  
  
Disciplines:  
  
  
PT                                                    
  
  
2022                                  
  
  
Active                                    
  
  
1 goal linked to   
scheduled/document  
ed intervention                           
  
  
1 goal   
intervention   
scheduled/document  
ed in this visit  
   
                                                      
  
  
Medication   
Education  
  
  
Disciplines:  
  
  
Skilled Services                                      
  
  
2022                                  
  
  
Active                                    
  
  
1 goal linked to   
scheduled/document  
ed intervention                           
  
  
1 goal   
intervention   
scheduled/document  
ed in this visit  
   
                                                      
  
  
Sepsis  
  
  
Disciplines:  
  
  
Skilled Services                                      
  
  
2022                                  
  
  
Active                                    
  
  
1 goal linked to   
scheduled/document  
ed intervention                           
  
  
1 goal   
intervention   
scheduled/document  
ed in this visit  
   
                                                      
  
  
Physician   
Specific   
Parameters  
  
  
Disciplines:  
  
  
Skilled Services                                      
  
  
2022                                  
  
  
Active                                    
  
  
1 goal linked to   
scheduled/document  
ed intervention                           
  
  
1 goal   
intervention   
scheduled/document  
ed in this visit  
   
                                                      
  
  
Risk for Falls  
  
  
Disciplines:  
  
  
Skilled Services                                      
  
  
2022                                  
  
  
Active                                    
  
  
1 goal linked to   
scheduled/document  
ed intervention                           
  
  
1 goal   
intervention   
scheduled/document  
ed in this visit  
  
  
                                      Goals  
  
                      Goal       Associated Problem Outcome    Goal Met?  Visit   
Notes  
   
                                                      
  
  
Improved Muscle Performance   
and/or ROM  
  
  
Description:  
  
  
LTG: Patient will demonstrate   
improved muscle performance   
to meet functional goals as   
evidenced by ability to   
tolerate 5 sit to stands in   
30 sec and tolerate 10 min of   
a standing activity, to be   
achieved by 10/1/22. .  
  
  
  
  
  
STG: Patient and/or caregiver   
will verbalize/demonstrate   
independence with home   
exercise program, to improve   
functional mobility, to be   
achieved by 9/10/22.  
  
  
  
  
  
LTG: Patient will demonstrate   
improved left hip active   
range of motion to 100* in   
standing degrees, to meet   
functional goals, to be   
achieved by 10/1/22. .                    
  
  
PT Impaired muscle   
performance and/or ROM                    
  
                                          
  
  
No                                        
   
                                                      
  
  
Improved Transfers  
  
  
Description:  
  
  
  
  
  
  
  
  
LTG: Patient will demonstrate   
safe transfers to/from bed,   
chair, toilet, shower/tub and   
car independently with AD, to   
be achieved by 10/1/22. .  
  
                                          
  
  
PT Impaired mobility                      
  
                                          
  
  
No                                        
   
                                                      
  
  
Improved Gait  
  
  
Description:  
  
  
  
  
  
LTG: Patient will demonstrate   
improved gait ability as   
evidenced by ambulation 150   
feet with rollator walker   
independently with AD, to   
return to safe household and   
community ambulation, in   
order to return to plf , to   
be achieved by 10/1/22. .  
  
  
  
  
                                          
  
  
PT Impaired gait                          
  
                                          
  
  
No                                        
   
                                                      
  
  
Improved Balance  
  
  
Description:  
  
  
  
  
  
LTG: Patient will demonstrate   
improved standing balance to   
meet functional goals as   
evidenced by TUG score of <28   
to be achieved by 10/1/22. .              
  
  
PT Impaired balance                       
  
                                          
  
  
No                                        
   
                                                      
  
  
Demonstrate understanding of   
education  
  
  
Description:  
  
  
Patient and/or caregiver will   
understand educational   
instruction to be achieved by   
10/1/22. .                                
  
  
PT Learning Assessment                    
  
                                          
  
  
No                                        
   
                                                      
  
  
Patient/caregiver will   
demonstrate ability to   
obtain, store, identify and   
administer ordered   
medications, keep accurate   
medication list in home, and   
adhere to medication schedule  
  
  
Description:  
  
  
Patient/caregiver will   
demonstrate ability to   
obtain, store, identify and   
administer ordered   
medications, keep accurate   
medication list in home, and   
adhere to medication schedule   
by 10/1/22.  
  
                                          
  
  
Medication Education                      
  
                                          
  
  
No                                        
   
                                                      
  
  
Patient/caregiver will be   
able to identify and report   
symptoms of sepsis  
  
  
Description:  
  
  
Patient/caregiver will be   
able to identify   
signs/symptoms of sepsis   
infection and will verbalize   
actions to take if suspected   
by 10/1/22. .                             
  
  
Sepsis                                    
  
                                          
  
  
No                                        
   
                                                      
  
  
Patient to maintain   
parameters within   
physician-specified ranges   
throughout certification   
period                                    
  
  
Physician Specific   
Parameters                                
  
                                          
  
  
No                                        
   
                                                      
  
  
Manage Risk for falls  
  
  
Description:  
  
  
Patient/caregiver will   
verbalize knowledge of   
individualized fall   
prevention strategies by   
10/1/22. .                                
  
  
Risk for Falls                            
  
                                          
  
  
No                                        
  
  
                                  Interventions  
  
                                        Intervention        Associated   
Problem/Goal        Status              Variance            Visit Notes  
   
                                                      
  
  
Physical Therapy   
Therapeutic Exercises                   Problem:PT Impaired   
muscle performance   
and/or ROM  
Goal:Improved Muscle   
Performance and/or   
ROM                                       
  
  
Completed                                 
  
                                          
  
  
patient instructed on   
strengthening   
exercises including   
Supine : GS,QS,HS,   
HIPADD, HIP BD, HEEL   
SLIDES, SAQ,BRIDGING   
X 15  
  
  
SEATED: FAQ, HIP   
FLEX, ,HIP ADD, HIP   
ABD AP X10 with blue   
T band  
  
  
Standing: heel raises   
( unable to do) , Toe   
raises, knee flexion,   
hip flexionX 10 reps  
  
  
with verbal and   
tactile cues for   
TECHNIQUE .  
  
  
  
  
  
noticeably stronger   
with the supine ther   
ex, seated ther ex   
difficult with t   
band, struglled   
greatly with standing   
ther ex, Unable to   
lift heels off floor   
with heel raises  
   
                                                      
  
  
Physical Therapy   
Transfer Training                       Problem:PT Impaired   
mobility  
Goal:Improved   
Transfers                                 
  
  
Completed                                 
  
                                          
  
  
Transfer training and   
instruction to   
patient on safe   
transfers to and from   
bed and chair with   
supervision and   
verbal cues for cues.  
   
                                                      
  
  
Physical Therapy Gait   
Training                                Problem:PT Impaired   
gait  
Goal:Improved Gait                        
  
  
Completed                                 
  
                                          
  
  
Gait training and   
instruction to   
patient on safe   
ambulation with front   
wheeled walker for   
50' x 2 feet with   
stand by assist, with   
tactile cues for   
corrections of gait   
deviations including   
picking up feet,   
cannot toe off due to   
calf weakness .  
   
                                                      
  
  
Physical Therapy   
Balance Training                        Problem:PT Impaired   
balance  
Goal:Improved Balance                     
  
  
Completed                                 
  
                                          
  
  
Developed,   
implemented, and   
instructed patient on   
standing balance   
exercises including   
standing ther ex.  
   
                                                      
  
  
Instruct and educate   
on knowledge deficits                   Problem:PT Learning   
Assessment  
Goal:Demonstrate   
understanding of   
education                                 
  
  
Completed                                 
  
                                          
  
  
patient verbalize   
and/or demonstrate   
understanding of   
physical therapy   
education including   
functional activity   
and home exercise   
program.  
  
  
  
  
  
Education methods   
include: verbal cues.  
  
  
  
  
  
Further education   
required to improve   
knowledge and   
compliance with fall   
prevention   
strategies, home   
safety, functional   
activity and home   
exercise program.  
   
                                                      
  
  
Medication Education  
  
  
Description:  
  
  
Evaluate/instruct   
patient/caregiver on   
obtaining, storing,   
identifying and   
administering ordered   
medications as well as   
keeping accurate   
medication list in the   
home and adhereing to   
medication schedule                     Problem:Medication   
Education  
Goal:Patient/caregive  
r will demonstrate   
ability to obtain,   
store, identify and   
administer ordered   
medications, keep   
accurate medication   
list in home, and   
adhere to medication   
schedule                                  
  
  
Completed                                 
  
                                          
  
  
Patient instructed on   
importance of keeping   
accurate medication   
list in home and   
adhering to   
medication schedule.  
   
                                                      
  
  
Risk of Sepsis  
  
  
Description:  
  
  
Patient is at risk for   
sepsis. Monitor   
closely for s/s of   
sepsis.                                 Problem:Sepsis  
Goal:Patient/caregive  
r will be able to   
identify and report   
symptoms of sepsis                        
  
  
Completed                                 
  
                                          
   
                                                      
  
  
SPO2  
  
  
Description:  
  
  
Notify Dr. Ahuja   
if pulse ox is <92% at   
rest.                                   Problem:Physician   
Specific Parameters  
Goal:Patient to   
maintain parameters   
within   
physician-specified   
ranges throughout   
certification period                      
  
  
Completed                                 
  
                                          
   
                                                      
  
  
Instruct on individual   
fall risk factors and   
strategies to prevent   
falls and injuries   
caused by falls.                        Problem:Risk for   
Falls  
Goal:Manage Risk for   
falls                                     
  
  
Completed                                 
  
                                          
  
  
PT: Patient   
instructed on  
  
  
Eliminating   
Environmental   
Hazards: Keep   
pathways clear, Keep   
pets out of pathways,   
Remove unsafe rugs,   
Move furniture from   
pathways and Wear   
supportive shoes or   
non-skid socks  
  
  
Managing Impaired   
Functional Mobility:   
Use assistive   
device(s): front   
wheeled walker  
  
  
Managing Pain  
  
  
  
  
  
  
documented in this encounter  
[facility]'s home Plan of care note* Visit Details  
  
                                                    Visit Type -PTA ROUTINE  
Discipline -Physical Therapy  
  
  
                                    Problems  
  
                    Problem   Description Start Date Status    Goals     Interve  
ntions  
   
                                                      
  
  
PT Impaired   
muscle   
performance   
and/or ROM  
  
  
Disciplines:  
  
  
PT                                                    
  
  
2022                                  
  
  
Active                                    
  
  
1 goal linked to   
scheduled/document  
ed intervention                           
  
  
1 goal   
intervention   
scheduled/document  
ed in this visit  
   
                                                      
  
  
PT Impaired gait  
  
  
Disciplines:  
  
  
PT                                                    
  
  
2022                                  
  
  
Active                                    
  
  
1 goal linked to   
scheduled/document  
ed intervention                           
  
  
1 goal   
intervention   
scheduled/document  
ed in this visit  
   
                                                      
  
  
PT Impaired   
balance  
  
  
Disciplines:  
  
  
PT                                                    
  
  
2022                                  
  
  
Active                                    
  
  
1 goal linked to   
scheduled/document  
ed intervention                           
  
  
1 goal   
intervention   
scheduled/document  
ed in this visit  
   
                                                      
  
  
PT Learning   
Assessment  
  
  
Disciplines:  
  
  
PT                                                    
  
  
2022                                  
  
  
Active                                    
  
  
1 goal linked to   
scheduled/document  
ed intervention                           
  
  
1 goal   
intervention   
scheduled/document  
ed in this visit  
   
                                                      
  
  
Medication   
Education  
  
  
Disciplines:  
  
  
Skilled Services                                      
  
  
2022                                  
  
  
Active                                    
  
  
1 goal linked to   
scheduled/document  
ed intervention                           
  
  
1 goal   
intervention   
scheduled/document  
ed in this visit  
   
                                                      
  
  
Sepsis  
  
  
Disciplines:  
  
  
Skilled Services                                      
  
  
2022                                  
  
  
Active                                    
  
  
1 goal linked to   
scheduled/document  
ed intervention                           
  
  
1 goal   
intervention   
scheduled/document  
ed in this visit  
   
                                                      
  
  
Physician   
Specific   
Parameters  
  
  
Disciplines:  
  
  
Skilled Services                                      
  
  
2022                                  
  
  
Active                                    
  
  
1 goal linked to   
scheduled/document  
ed intervention                           
  
  
1 goal   
intervention   
scheduled/document  
ed in this visit  
   
                                                      
  
  
Risk for Falls  
  
  
Disciplines:  
  
  
Skilled Services                                      
  
  
2022                                  
  
  
Active                                    
  
  
1 goal linked to   
scheduled/document  
ed intervention                           
  
  
1 goal   
intervention   
scheduled/document  
ed in this visit  
   
                                                      
  
  
Pain  
  
  
Disciplines:  
  
  
Skilled Services                                      
  
  
2022                                  
  
  
Active                                    
  
  
1 goal linked to   
scheduled/document  
ed intervention                           
  
  
1 goal   
intervention   
scheduled/document  
ed in this visit  
   
                                                      
  
  
Discharge  
  
  
Disciplines:  
  
  
Skilled Services                                      
  
  
2022                                  
  
  
Active                                    
  
  
1 goal linked to   
scheduled/document  
ed intervention                           
  
  
2 goal   
interventions   
scheduled/document  
ed in this visit  
  
  
                                      Goals  
  
                      Goal       Associated Problem Outcome    Goal Met?  Visit   
Notes  
   
                                                      
  
  
Improved Muscle Performance   
and/or ROM  
  
  
Description:  
  
  
LTG: Patient will demonstrate   
improved muscle performance   
to meet functional goals as   
evidenced by ability to   
tolerate 5 sit to stands in   
30 sec and tolerate 10 min of   
a standing activity, to be   
achieved by 10/1/22. .  
  
  
  
  
  
STG: Patient and/or caregiver   
will verbalize/demonstrate   
independence with home   
exercise program, to improve   
functional mobility, to be   
achieved by 9/10/22.  
  
  
  
  
  
LTG: Patient will demonstrate   
improved left hip active   
range of motion to 100* in   
standing degrees, to meet   
functional goals, to be   
achieved by 10/1/22. .                    
  
  
PT Impaired muscle   
performance and/or ROM                    
  
                                          
  
  
No                                        
   
                                                      
  
  
Improved Gait  
  
  
Description:  
  
  
  
  
  
LTG: Patient will demonstrate   
improved gait ability as   
evidenced by ambulation 150   
feet with rollator walker   
independently with AD, to   
return to safe household and   
community ambulation, in   
order to return to plf , to   
be achieved by 10/1/22. .  
  
  
  
  
                                          
  
  
PT Impaired gait                          
  
                                          
  
  
No                                        
   
                                                      
  
  
Improved Balance  
  
  
Description:  
  
  
  
  
  
LTG: Patient will demonstrate   
improved standing balance to   
meet functional goals as   
evidenced by TUG score of <28   
to be achieved by 10/1/22. .              
  
  
PT Impaired balance                       
  
                                          
  
  
No                                        
   
                                                      
  
  
Demonstrate understanding of   
education  
  
  
Description:  
  
  
Patient and/or caregiver will   
understand educational   
instruction to be achieved by   
10/1/22. .                                
  
  
PT Learning Assessment                    
  
                                          
  
  
No                                        
   
                                                      
  
  
Patient/caregiver will   
demonstrate ability to   
obtain, store, identify and   
administer ordered   
medications, keep accurate   
medication list in home, and   
adhere to medication schedule  
  
  
Description:  
  
  
Patient/caregiver will   
demonstrate ability to   
obtain, store, identify and   
administer ordered   
medications, keep accurate   
medication list in home, and   
adhere to medication schedule   
by 10/1/22.  
  
                                          
  
  
Medication Education                      
  
                                          
  
  
No                                        
   
                                                      
  
  
Patient/caregiver will be   
able to identify and report   
symptoms of sepsis  
  
  
Description:  
  
  
Patient/caregiver will be   
able to identify   
signs/symptoms of sepsis   
infection and will verbalize   
actions to take if suspected   
by 10/1/22. .                             
  
  
Sepsis                                    
  
                                          
  
  
No                                        
   
                                                      
  
  
Patient to maintain   
parameters within   
physician-specified ranges   
throughout certification   
period                                    
  
  
Physician Specific   
Parameters                                
  
                                          
  
  
No                                        
   
                                                      
  
  
Manage Risk for falls  
  
  
Description:  
  
  
Patient/caregiver will   
verbalize knowledge of   
individualized fall   
prevention strategies by   
10/1/22. .                                
  
  
Risk for Falls                            
  
                                          
  
  
No                                        
   
                                                      
  
  
Manage Pain  
  
  
Description:  
  
  
Patient/caregiver will   
verbalize knowledge and   
understanding of appropriate   
techniques to control pain,   
including pain medication and   
non-pharmacological   
techniques. Patient will   
verbalize or demonstrate an   
acceptable level of pain as   
evidenced by a pain score of   
<5/10 and improvement in   
ability to perform activities   
of daily living to be   
achieved by 10/1/22. .                    
  
  
Pain                                      
  
                                          
  
  
No                                        
   
                                                      
  
  
Manage discharge planning  
  
  
Description:  
  
  
Patient/caregiver will   
verbalize understanding of   
ongoing discharge plan   
provided related to disease   
management, arrangements for   
outpatient and/or community   
services, obtaining   
medications, supplies, and   
DME, as needed throughout   
certification period.                     
  
  
Discharge                                 
  
                                          
  
  
No                                        
  
  
                                  Interventions  
  
                                        Intervention        Associated   
Problem/Goal        Status              Variance            Visit Notes  
   
                                                      
  
  
Physical Therapy   
Therapeutic Exercises                   Problem:PT Impaired   
muscle performance   
and/or ROM  
Goal:Improved Muscle   
Performance and/or   
ROM                                       
  
  
Completed                                 
  
                                          
  
  
patient instructed on   
strengthening   
exercises including   
seated blue tband hip   
abd and flexion, laq   
and heel toe raises   
x's 1 each. standing   
toe raises, hip abd   
and flexion,   
hamstring curls x's   
15 each. with verbal   
and visual cues for   
correct form and   
pace. patient   
instructed to perform   
home exercise program   
twice a day which   
included above   
exercises .  
   
                                                      
  
  
Physical Therapy Gait   
Training                                Problem:PT Impaired   
gait  
Goal:Improved Gait                        
  
  
Completed                                 
  
                                          
  
  
Gait training and   
instruction to   
patient on safe   
ambulation with front   
wheeled walker for   
2x's50 feet with   
supervision, with   
verbal cues for   
corrections of gait   
deviations including   
posture and staying   
c;loser to ww.  
   
                                                      
  
  
Physical Therapy   
Balance Training                        Problem:PT Impaired   
balance  
Goal:Improved Balance                     
  
  
Completed                                 
  
                                          
  
  
Developed,   
implemented, and   
instructed patient on   
standing balance   
exercises including   
lateral stepping at   
counter 5feet x's 4   
w/ David UE support.   
step over w/ww   
3concesx's 2laps   
w/ww.  
   
                                                      
  
  
Instruct and educate   
on knowledge deficits                   Problem:PT Learning   
Assessment  
Goal:Demonstrate   
understanding of   
education                                 
  
  
Completed                                 
  
                                          
  
  
patient verbalize   
and/or demonstrate   
understanding of   
physical therapy   
education including   
home exercise   
program.  
  
  
  
  
  
Education methods   
include: verbal cues   
and visual cues.  
  
  
  
  
  
Further education   
required to improve   
knowledge and   
compliance with home   
exercise program.  
   
                                                      
  
  
Medication Education  
  
  
Description:  
  
  
Evaluate/instruct   
patient/caregiver on   
obtaining, storing,   
identifying and   
administering ordered   
medications as well as   
keeping accurate   
medication list in the   
home and adhereing to   
medication schedule                     Problem:Medication   
Education  
Goal:Patient/caregive  
r will demonstrate   
ability to obtain,   
store, identify and   
administer ordered   
medications, keep   
accurate medication   
list in home, and   
adhere to medication   
schedule                                  
  
  
Completed                                 
  
                                          
  
  
Patient instructed on   
importance of keeping   
accurate medication   
list in home.  
   
                                                      
  
  
Risk of Sepsis  
  
  
Description:  
  
  
Patient is at risk for   
sepsis. Monitor   
closely for s/s of   
sepsis.                                 Problem:Sepsis  
Goal:Patient/caregive  
r will be able to   
identify and report   
symptoms of sepsis                        
  
  
Completed                                 
  
                                          
   
                                                      
  
  
SPO2  
  
  
Description:  
  
  
Notify Dr. Ahuja   
if pulse ox is <92% at   
rest.                                   Problem:Physician   
Specific Parameters  
Goal:Patient to   
maintain parameters   
within   
physician-specified   
ranges throughout   
certification period                      
  
  
Completed                                 
  
                                          
   
                                                      
  
  
Instruct on individual   
fall risk factors and   
strategies to prevent   
falls and injuries   
caused by falls.                        Problem:Risk for   
Falls  
Goal:Manage Risk for   
falls                                     
  
  
Completed                                 
  
                                          
  
  
PT: Patient   
instructed on   
Managing Impaired   
Functional Mobility:   
Use assistive   
device(s): front   
wheeled walker  
  
  
   
                                                      
  
  
Instruct on pain and   
instruct on strategies   
to control pain                         Problem:Pain  
Goal:Manage Pain                          
  
  
Completed                                 
  
                                          
  
  
patient instructed on   
techniques to control   
pain including   
Non-Pharmacological   
measures; rest.  
   
                                                      
  
  
Deliver [facility]                           Problem:Discharge  
Goal:Manage discharge   
planning                                  
  
  
Completed                                 
  
                                          
  
  
Delivered [facility] on   
22 for discharge   
date of 22   
Patient denies   
questions/concerns w/   
form .  
   
                                                      
  
  
Instruct on ongoing   
discharge plan                          Problem:Discharge  
Goal:Manage discharge   
planning                                  
  
  
Completed                                 
  
                                          
  
  
Ongoing Discharge   
plan: Discharge plan   
discussed with   
patient including   
frequency and   
duration for home PT   
and plan for   
transition to: live   
independently at home   
without ongoing   
services.  
  
  
  
documented in this encounter  
[facility]'s home Plan of care note* Visit Details  
  
                                                    Visit Type -PT REASSESSMENT  
Discipline -Physical Therapy  
  
  
                                    Problems  
  
                    Problem   Description Start Date Status    Goals     Interve  
ntions  
   
                                                      
  
  
PT Impaired   
muscle   
performance   
and/or ROM  
  
  
Disciplines:  
  
  
PT                                                    
  
  
2022                                  
  
  
Active                                    
  
  
1 goal linked to   
scheduled/document  
ed intervention                           
  
  
1 goal   
intervention   
scheduled/document  
ed in this visit  
   
                                                      
  
  
PT Impaired   
mobility  
  
  
Disciplines:  
  
  
PT                                                    
  
  
2022                                  
  
  
Active                                    
  
  
1 goal linked to   
scheduled/document  
ed intervention                           
  
  
1 goal   
intervention   
scheduled/document  
ed in this visit  
   
                                                      
  
  
PT Impaired gait  
  
  
Disciplines:  
  
  
PT                                                    
  
  
2022                                  
  
  
Active                                    
  
  
1 goal linked to   
scheduled/document  
ed intervention                           
  
  
1 goal   
intervention   
scheduled/document  
ed in this visit  
   
                                                      
  
  
PT Learning   
Assessment  
  
  
Disciplines:  
  
  
PT                                                    
  
  
2022                                  
  
  
Active                                    
  
  
1 goal linked to   
scheduled/document  
ed intervention                           
  
  
1 goal   
intervention   
scheduled/document  
ed in this visit  
   
                                                      
  
  
Medication   
Education  
  
  
Disciplines:  
  
  
Skilled Services                                      
  
  
2022                                  
  
  
Active                                    
  
  
1 goal linked to   
scheduled/document  
ed intervention                           
  
  
1 goal   
intervention   
scheduled/document  
ed in this visit  
   
                                                      
  
  
Sepsis  
  
  
Disciplines:  
  
  
Skilled Services                                      
  
  
2022                                  
  
  
Active                                    
  
  
1 goal linked to   
scheduled/document  
ed intervention                           
  
  
1 goal   
intervention   
scheduled/document  
ed in this visit  
   
                                                      
  
  
Physician   
Specific   
Parameters  
  
  
Disciplines:  
  
  
Skilled Services                                      
  
  
2022                                  
  
  
Active                                    
  
  
1 goal linked to   
scheduled/document  
ed intervention                           
  
  
1 goal   
intervention   
scheduled/document  
ed in this visit  
   
                                                      
  
  
Risk for Falls  
  
  
Disciplines:  
  
  
Skilled Services                                      
  
  
2022                                  
  
  
Active                                    
  
  
1 goal linked to   
scheduled/document  
ed intervention                           
  
  
1 goal   
intervention   
scheduled/document  
ed in this visit  
   
                                                      
  
  
Pain  
  
  
Disciplines:  
  
  
Skilled Services                                      
  
  
2022                                  
  
  
Active                                    
  
  
1 goal linked to   
scheduled/document  
ed intervention                           
  
  
1 goal   
intervention   
scheduled/document  
ed in this visit  
   
                                                      
  
  
Discharge  
  
  
Disciplines:  
  
  
Skilled Services                                      
  
  
2022                                  
  
  
Active                                    
  
  
1 goal linked to   
scheduled/document  
ed intervention                           
  
  
1 goal   
intervention   
scheduled/document  
ed in this visit  
  
  
                                      Goals  
  
                      Goal       Associated Problem Outcome    Goal Met?  Visit   
Notes  
   
                                                      
  
  
Improved Muscle Performance   
and/or ROM  
  
  
Description:  
  
  
LTG: Patient will demonstrate   
improved muscle performance   
to meet functional goals as   
evidenced by ability to   
tolerate 5 sit to stands in   
30 sec and tolerate 10 min of   
a standing activity, to be   
achieved by 10/1/22. .  
  
  
updated 22: to be   
achieved by 10/14/22  
  
  
  
  
  
STG: Patient and/or caregiver   
will verbalize/demonstrate   
independence with home   
exercise program, to improve   
functional mobility, to be   
achieved by 9/10/22.updated   
22: to be achieved by   
10/14/22  
  
  
  
  
  
  
  
  
LTG: Patient will demonstrate   
improved left hip active   
range of motion to 100* in   
standing degrees, to meet   
functional goals, to be   
achieved by 10/1/22.  
  
  
updated 22: to be   
achieved by 10/14/22                      
  
  
PT Impaired muscle   
performance and/or ROM                    
  
                                          
  
  
No                                        
   
                                                      
  
  
Improved Transfers  
  
  
Description:  
  
  
  
  
  
  
  
  
LTG: Patient will demonstrate   
safe transfers to/from bed,   
chair, toilet, shower/tub and   
car independently with AD, to   
be achieved by 10/1/22. .  
  
  
Updated 22: to be   
achieved by 10/14/22  
  
                                          
  
  
PT Impaired mobility                      
  
                                          
  
  
No                                        
   
                                                      
  
  
Improved Gait  
  
  
Description:  
  
  
  
  
  
LTG: Patient will demonstrate   
improved gait ability as   
evidenced by ambulation 150   
feet with rollator walker   
independently with AD, to   
return to safe household and   
community ambulation, in   
order to return to plf , to   
be achieved by 10/1/22. .  
  
  
update 22: ambulate for   
3 minutes with FWW mod indep   
indicating improved endurance   
with adequate foot clearance   
to be met 10/14/22  
  
                                          
  
  
PT Impaired gait                          
  
                                          
  
  
No                                        
   
                                                      
  
  
Demonstrate understanding of   
education  
  
  
Description:  
  
  
Patient and/or caregiver will   
understand educational   
instruction to be achieved by   
10/1/22. .  
  
  
updated 22: to be   
achieved by 10/14/22                      
  
  
PT Learning Assessment                    
  
                                          
  
  
No                                        
   
                                                      
  
  
Patient/caregiver will   
demonstrate ability to   
obtain, store, identify and   
administer ordered   
medications, keep accurate   
medication list in home, and   
adhere to medication schedule  
  
  
Description:  
  
  
Patient/caregiver will   
demonstrate ability to   
obtain, store, identify and   
administer ordered   
medications, keep accurate   
medication list in home, and   
adhere to medication schedule   
by 10/1/22.  
  
  
updated 22: to be   
achieved by 10/14/22                      
  
  
Medication Education                      
  
                                          
  
  
No                                        
   
                                                      
  
  
Patient/caregiver will be   
able to identify and report   
symptoms of sepsis  
  
  
Description:  
  
  
Patient/caregiver will be   
able to identify   
signs/symptoms of sepsis   
infection and will verbalize   
actions to take if suspected   
by 10/1/22.  
  
  
updated 22: to be   
achieved by 10/14/22.                     
  
  
Sepsis                                    
  
                                          
  
  
No                                        
   
                                                      
  
  
Patient to maintain   
parameters within   
physician-specified ranges   
throughout certification   
period                                    
  
  
Physician Specific   
Parameters                                
  
                                          
  
  
No                                        
   
                                                      
  
  
Manage Risk for falls  
  
  
Description:  
  
  
Patient/caregiver will   
verbalize knowledge of   
individualized fall   
prevention strategies by   
10/1/22.  
  
  
updated 22: to be   
achieved by 10/14/22                      
  
  
Risk for Falls                            
  
                                          
  
  
No                                        
   
                                                      
  
  
Manage Pain  
  
  
Description:  
  
  
Patient/caregiver will   
verbalize knowledge and   
understanding of appropriate   
techniques to control pain,   
including pain medication and   
non-pharmacological   
techniques. Patient will   
verbalize or demonstrate an   
acceptable level of pain as   
evidenced by a pain score of   
<5/10 and improvement in   
ability to perform activities   
of daily living to be   
achieved by 10/1/22.  
  
  
updated 22: to be   
achieved by 10/14/22                      
  
  
Pain                                      
  
                                          
  
  
No                                        
   
                                                      
  
  
Manage discharge planning  
  
  
Description:  
  
  
Patient/caregiver will   
verbalize understanding of   
ongoing discharge plan   
provided related to disease   
management, arrangements for   
outpatient and/or community   
services, obtaining   
medications, supplies, and   
DME, as needed throughout   
certification period.                     
  
  
Discharge                                 
  
                                          
  
  
No                                        
  
  
                                  Interventions  
  
                                        Intervention        Associated   
Problem/Goal        Status              Variance            Visit Notes  
   
                                                      
  
  
Physical Therapy   
Therapeutic Exercises                   Problem:PT Impaired   
muscle performance   
and/or ROM  
Goal:Improved Muscle   
Performance and/or   
ROM                                       
  
  
Completed                                 
  
                                          
  
  
patient instructed on   
strengthening   
exercises including   
ist to stands x 8 reps   
with verbal cues for   
full uprght position.   
patient instructed to   
perform home exercise   
program twice a day   
which included   
previous program.  
   
                                                      
  
  
Physical Therapy   
Transfer Training                       Problem:PT Impaired   
mobility  
Goal:Improved   
Transfers                                 
  
  
Completed                                 
  
                                          
  
  
Transfer training and   
instruction to patient   
on safe transfers to   
and from bed and chair   
with supervision and   
verbal and visual cues   
for correct hand   
placement.  
   
                                                      
  
  
Physical Therapy Gait   
Training                                Problem:PT Impaired   
gait  
Goal:Improved Gait                        
  
  
Completed                                 
  
                                          
  
  
Gait training and   
instruction to patient   
on safe ambulation   
with rollator walker   
for 3 mintues 20   
seconds, , with   
decrease in pace and   
increased shuffle   
after 1.5 minutes with   
supervision, with   
verbal cues for   
corrections of gait   
deviations including   
increasing heel strike   
and upright posture.  
   
                                                      
  
  
Instruct and educate   
on knowledge deficits                   Problem:PT Learning   
Assessment  
Goal:Demonstrate   
understanding of   
education                                 
  
  
Completed                                 
  
                                          
  
  
patient verbalize   
and/or demonstrate   
understanding of   
physical therapy   
education including   
pain management, fall   
prevention strategies,   
functional activity   
and home exercise   
program.  
  
  
  
  
  
Education methods   
include: verbal cues.  
  
  
  
  
  
Further education   
required to improve   
knowledge and   
compliance with pain   
management, fall   
prevention strategies,   
functional activity   
and home exercise   
program.  
   
                                                      
  
  
Medication Education  
  
  
Description:  
  
  
Evaluate/instruct   
patient/caregiver on   
obtaining, storing,   
identifying and   
administering ordered   
medications as well   
as keeping accurate   
medication list in   
the home and   
adhereing to   
medication schedule                     Problem:Medication   
Education  
Goal:Patient/caregive  
r will demonstrate   
ability to obtain,   
store, identify and   
administer ordered   
medications, keep   
accurate medication   
list in home, and   
adhere to medication   
schedule                                  
  
  
Completed                                 
  
                                          
  
  
Patient instructed on   
adhering to medication   
schedule.  
   
                                                      
  
  
Risk of Sepsis  
  
  
Description:  
  
  
Patient is at risk   
for sepsis. Monitor   
closely for s/s of   
sepsis.                                 Problem:Sepsis  
Goal:Patient/caregive  
r will be able to   
identify and report   
symptoms of sepsis                        
  
  
Completed                                 
  
                                          
   
                                                      
  
  
SPO2  
  
  
Description:  
  
  
Notify Dr. Ahuja   
if pulse ox is <92%   
at rest.                                Problem:Physician   
Specific Parameters  
Goal:Patient to   
maintain parameters   
within   
physician-specified   
ranges throughout   
certification period                      
  
  
Completed                                 
  
                                          
   
                                                      
  
  
Instruct on   
individual fall risk   
factors and   
strategies to prevent   
falls and injuries   
caused by falls.                        Problem:Risk for   
Falls  
Goal:Manage Risk for   
falls                                     
  
  
Completed                                 
  
                                          
  
  
PT: Patient instructed   
on  
  
  
Eliminating   
Environmental Hazards:   
Keep pathways clear,   
Keep rooms and   
walkways well lit,   
Wear supportive shoes   
or non-skid socks and   
Keep frequently used   
items within reach  
  
  
Managing Impaired   
Functional Mobility:   
Use assistive   
device(s): rollator   
walker  
  
  
Managing Pain  
  
  
  
  
  
   
                                                      
  
  
Instruct on pain and   
instruct on   
strategies to control   
pain                                    Problem:Pain  
Goal:Manage Pain                          
  
  
Completed                                 
  
                                          
  
  
patient instructed on   
techniques to control   
pain including   
Pharmacological   
measures and   
Non-Pharmacological   
measures; rest.  
   
                                                      
  
  
Instruct on ongoing   
discharge plan                          Problem:Discharge  
Goal:Manage discharge   
planning                                  
  
  
Completed                                 
  
                                          
  
  
Ongoing Discharge   
plan: Discharge plan   
discussed with patient   
including frequency   
and duration for home   
PT and plan for   
transition to: live   
independently at home   
without ongoing   
services.  
  
  
  
documented in this encounter  
[facility]  
  
                                                    Visit Type -PTA ROUTINE  
Discipline -Physical Therapy  
  
  
                                    Problems  
  
                    Problem   Description Start Date Status    Goals     Interve  
ntions  
   
                                                      
  
  
PT Impaired   
muscle   
performance   
and/or ROM  
  
  
Disciplines:  
  
  
PT                                                    
  
  
2022                                  
  
  
Active                                    
  
  
1 goal linked to   
scheduled/document  
ed intervention                           
  
  
1 goal   
intervention   
scheduled/document  
ed in this visit  
   
                                                      
  
  
PT Impaired   
mobility  
  
  
Disciplines:  
  
  
PT                                                    
  
  
2022                                  
  
  
Active                                    
  
  
1 goal linked to   
scheduled/document  
ed intervention                           
  
  
1 goal   
intervention   
scheduled/document  
ed in this visit  
   
                                                      
  
  
PT Impaired gait  
  
  
Disciplines:  
  
  
PT                                                    
  
  
2022                                  
  
  
Active                                    
  
  
1 goal linked to   
scheduled/document  
ed intervention                           
  
  
1 goal   
intervention   
scheduled/document  
ed in this visit  
   
                                                      
  
  
PT Impaired   
balance  
  
  
Disciplines:  
  
  
PT                                                    
  
  
2022                                  
  
  
Active                                    
  
  
1 goal linked to   
scheduled/document  
ed intervention                           
  
  
1 goal   
intervention   
scheduled/document  
ed in this visit  
   
                                                      
  
  
PT Learning   
Assessment  
  
  
Disciplines:  
  
  
PT                                                    
  
  
2022                                  
  
  
Active                                    
  
  
1 goal linked to   
scheduled/document  
ed intervention                           
  
  
1 goal   
intervention   
scheduled/document  
ed in this visit  
   
                                                      
  
  
Medication   
Education  
  
  
Disciplines:  
  
  
Skilled Services                                      
  
  
2022                                  
  
  
Active                                    
  
  
1 goal linked to   
scheduled/document  
ed intervention                           
  
  
1 goal   
intervention   
scheduled/document  
ed in this visit  
   
                                                      
  
  
Sepsis  
  
  
Disciplines:  
  
  
Skilled Services                                      
  
  
2022                                  
  
  
Active                                    
  
  
1 goal linked to   
scheduled/document  
ed intervention                           
  
  
1 goal   
intervention   
scheduled/document  
ed in this visit  
   
                                                      
  
  
Physician   
Specific   
Parameters  
  
  
Disciplines:  
  
  
Skilled Services                                      
  
  
2022                                  
  
  
Active                                    
  
  
1 goal linked to   
scheduled/document  
ed intervention                           
  
  
1 goal   
intervention   
scheduled/document  
ed in this visit  
   
                                                      
  
  
Risk for Falls  
  
  
Disciplines:  
  
  
Skilled Services                                      
  
  
2022                                  
  
  
Active                                    
  
  
1 goal linked to   
scheduled/document  
ed intervention                           
  
  
1 goal   
intervention   
scheduled/document  
ed in this visit  
   
                                                      
  
  
Pain  
  
  
Disciplines:  
  
  
Skilled Services                                      
  
  
2022                                  
  
  
Active                                    
  
  
1 goal linked to   
scheduled/document  
ed intervention                           
  
  
1 goal   
intervention   
scheduled/document  
ed in this visit  
   
                                                      
  
  
Discharge  
  
  
Disciplines:  
  
  
Skilled Services                                      
  
  
2022                                  
  
  
Active                                    
  
  
1 goal linked to   
scheduled/document  
ed intervention                           
  
  
1 goal   
intervention   
scheduled/document  
ed in this visit  
  
  
                                      Goals  
  
                      Goal       Associated Problem Outcome    Goal Met?  Visit   
Notes  
   
                                                      
  
  
Improved Muscle Performance   
and/or ROM  
  
  
Description:  
  
  
LTG: Patient will demonstrate   
improved muscle performance   
to meet functional goals as   
evidenced by ability to   
tolerate 5 sit to stands in   
30 sec and tolerate 10 min of   
a standing activity, to be   
achieved by 10/1/22. .  
  
  
updated 22: to be   
achieved by 10/14/22  
  
  
  
  
  
STG: Patient and/or caregiver   
will verbalize/demonstrate   
independence with home   
exercise program, to improve   
functional mobility, to be   
achieved by 9/10/22.updated   
22: to be achieved by   
10/14/22  
  
  
  
  
  
  
  
  
LTG: Patient will demonstrate   
improved left hip active   
range of motion to 100* in   
standing degrees, to meet   
functional goals, to be   
achieved by 10/1/22.  
  
  
updated 22: to be   
achieved by 10/14/22                      
  
  
PT Impaired muscle   
performance and/or ROM                    
  
                                          
  
  
No                                        
   
                                                      
  
  
Improved Transfers  
  
  
Description:  
  
  
  
  
  
  
  
  
LTG: Patient will demonstrate   
safe transfers to/from bed,   
chair, toilet, shower/tub and   
car independently with AD, to   
be achieved by 10/1/22. .  
  
  
Updated 22: to be   
achieved by 10/14/22  
  
                                          
  
  
PT Impaired mobility                      
  
                                          
  
  
No                                        
   
                                                      
  
  
Improved Gait  
  
  
Description:  
  
  
  
  
  
LTG: Patient will demonstrate   
improved gait ability as   
evidenced by ambulation 150   
feet with rollator walker   
independently with AD, to   
return to safe household and   
community ambulation, in   
order to return to plf , to   
be achieved by 10/1/22. .  
  
  
update 22: ambulate for   
3 minutes with FWW mod indep   
indicating improved endurance   
with adequate foot clearance   
to be met 10/14/22  
  
                                          
  
  
PT Impaired gait                          
  
                                          
  
  
No                                        
   
                                                      
  
  
Improved Balance  
  
  
Description:  
  
  
  
  
  
LTG: Patient will demonstrate   
improved standing balance to   
meet functional goals as   
evidenced by TUG score of <28   
to be achieved by 10/1/22. .  
  
  
MET 22                               
  
  
PT Impaired balance                       
  
                                          
  
  
No                                        
   
                                                      
  
  
Demonstrate understanding of   
education  
  
  
Description:  
  
  
Patient and/or caregiver will   
understand educational   
instruction to be achieved by   
10/1/22. .  
  
  
updated 22: to be   
achieved by 10/14/22                      
  
  
PT Learning Assessment                    
  
                                          
  
  
No                                        
   
                                                      
  
  
Patient/caregiver will   
demonstrate ability to   
obtain, store, identify and   
administer ordered   
medications, keep accurate   
medication list in home, and   
adhere to medication schedule  
  
  
Description:  
  
  
Patient/caregiver will   
demonstrate ability to   
obtain, store, identify and   
administer ordered   
medications, keep accurate   
medication list in home, and   
adhere to medication schedule   
by 10/1/22.  
  
  
updated 22: to be   
achieved by 10/14/22                      
  
  
Medication Education                      
  
                                          
  
  
No                                        
   
                                                      
  
  
Patient/caregiver will be   
able to identify and report   
symptoms of sepsis  
  
  
Description:  
  
  
Patient/caregiver will be   
able to identify   
signs/symptoms of sepsis   
infection and will verbalize   
actions to take if suspected   
by 10/1/22.  
  
  
updated 22: to be   
achieved by 10/14/22.                     
  
  
Sepsis                                    
  
                                          
  
  
No                                        
   
                                                      
  
  
Patient to maintain   
parameters within   
physician-specified ranges   
throughout certification   
period                                    
  
  
Physician Specific   
Parameters                                
  
                                          
  
  
No                                        
   
                                                      
  
  
Manage Risk for falls  
  
  
Description:  
  
  
Patient/caregiver will   
verbalize knowledge of   
individualized fall   
prevention strategies by   
10/1/22.  
  
  
updated 22: to be   
achieved by 10/14/22                      
  
  
Risk for Falls                            
  
                                          
  
  
No                                        
   
                                                      
  
  
Manage Pain  
  
  
Description:  
  
  
Patient/caregiver will   
verbalize knowledge and   
understanding of appropriate   
techniques to control pain,   
including pain medication and   
non-pharmacological   
techniques. Patient will   
verbalize or demonstrate an   
acceptable level of pain as   
evidenced by a pain score of   
<5/10 and improvement in   
ability to perform activities   
of daily living to be   
achieved by 10/1/22.  
  
  
updated 22: to be   
achieved by 10/14/22                      
  
  
Pain                                      
  
                                          
  
  
No                                        
   
                                                      
  
  
Manage discharge planning  
  
  
Description:  
  
  
Patient/caregiver will   
verbalize understanding of   
ongoing discharge plan   
provided related to disease   
management, arrangements for   
outpatient and/or community   
services, obtaining   
medications, supplies, and   
DME, as needed throughout   
certification period.                     
  
  
Discharge                                 
  
                                          
  
  
No                                        
  
  
                                  Interventions  
  
                                        Intervention        Associated   
Problem/Goal        Status              Variance            Visit Notes  
   
                                                      
  
  
Physical Therapy   
Therapeutic Exercises                   Problem:PT Impaired   
muscle performance   
and/or ROM  
Goal:Improved Muscle   
Performance and/or   
ROM                                       
  
  
Completed                                 
  
                                          
  
  
patient instructed on   
strengthening   
exercises including   
standing hip abd and   
flexion, hamstring   
curls x's 10 each.   
seated: laq, hip   
flexion and adduction   
and heel raises x's   
10 each. with verbal   
cues for correct pace   
. patient instructed   
to perform home   
exercise program   
twice a day which   
included above   
exercises .  
   
                                                      
  
  
Physical Therapy   
Transfer Training                       Problem:PT Impaired   
mobility  
Goal:Improved   
Transfers                                 
  
  
Completed                                 
  
                                          
  
  
Transfer training and   
instruction to   
patient on safe   
transfers to and from   
chair with   
supervision and   
verbal cues for   
reaching back for   
chair  
   
                                                      
  
  
Physical Therapy Gait   
Training                                Problem:PT Impaired   
gait  
Goal:Improved Gait                        
  
  
Completed                                 
  
                                          
  
  
Gait training and   
instruction to   
patient on safe   
ambulation with front   
wheeled walker for   
2x's 80 feet with   
stand by assist, with   
verbal cues for   
corrections of gait   
deviations including   
posture and staying   
closer to walker.  
   
                                                      
  
  
Physical Therapy   
Balance Training                        Problem:PT Impaired   
balance  
Goal:Improved Balance                     
  
  
Completed                                 
  
                                          
  
  
Developed,   
implemented, and   
instructed patient on   
standing balance   
exercises including   
lateral stepping at   
counter 5feet x's 4.  
   
                                                      
  
  
Instruct and educate   
on knowledge deficits                   Problem:PT Learning   
Assessment  
Goal:Demonstrate   
understanding of   
education                                 
  
  
Completed                                 
  
                                          
  
  
patient verbalize   
and/or demonstrate   
understanding of   
physical therapy   
education including   
home exercise   
program.  
  
  
  
  
  
Education methods   
include: verbal cues.  
  
  
  
  
  
Further education   
required to improve   
knowledge and   
compliance with home   
exercise program.  
   
                                                      
  
  
Medication Education  
  
  
Description:  
  
  
Evaluate/instruct   
patient/caregiver on   
obtaining, storing,   
identifying and   
administering ordered   
medications as well as   
keeping accurate   
medication list in the   
home and adhereing to   
medication schedule                     Problem:Medication   
Education  
Goal:Patient/caregive  
r will demonstrate   
ability to obtain,   
store, identify and   
administer ordered   
medications, keep   
accurate medication   
list in home, and   
adhere to medication   
schedule                                  
  
  
Completed                                 
  
                                          
  
  
Patient instructed on   
importance of keeping   
accurate medication   
list in home.  
   
                                                      
  
  
Risk of Sepsis  
  
  
Description:  
  
  
Patient is at risk for   
sepsis. Monitor   
closely for s/s of   
sepsis.                                 Problem:Sepsis  
Goal:Patient/caregive  
r will be able to   
identify and report   
symptoms of sepsis                        
  
  
Completed                                 
  
                                          
   
                                                      
  
  
SPO2  
  
  
Description:  
  
  
Notify Dr. Ahuja   
if pulse ox is <92% at   
rest.                                   Problem:Physician   
Specific Parameters  
Goal:Patient to   
maintain parameters   
within   
physician-specified   
ranges throughout   
certification period                      
  
  
Completed                                 
  
                                          
   
                                                      
  
  
Instruct on individual   
fall risk factors and   
strategies to prevent   
falls and injuries   
caused by falls.                        Problem:Risk for   
Falls  
Goal:Manage Risk for   
falls                                     
  
  
Completed                                 
  
                                          
  
  
PT: Patient   
instructed on   
Managing Impaired   
Functional Mobility:   
Use assistive   
device(s): front   
wheeled walker  
  
  
   
                                                      
  
  
Instruct on pain and   
instruct on strategies   
to control pain                         Problem:Pain  
Goal:Manage Pain                          
  
  
Completed                                 
  
                                          
  
  
patient instructed on   
techniques to control   
pain including   
Non-Pharmacological   
measures;   
positioning/elevation  
.  
   
                                                      
  
  
Instruct on ongoing   
discharge plan                          Problem:Discharge  
Goal:Manage discharge   
planning                                  
  
  
Completed                                 
  
                                          
  
  
Ongoing Discharge   
plan: Discharge plan   
discussed with   
patient including   
frequency and   
duration for home PT   
and plan for   
transition to: live   
independently at home   
without ongoing   
services.  
  
  
  
documented in this encounter  
[facility]'s home Plan of care note* Visit Details  
  
                                                    Visit Type -PTA ROUTINE  
Discipline -Physical Therapy  
  
  
                                    Problems  
  
                    Problem   Description Start Date Status    Goals     Interve  
ntions  
   
                                                      
  
  
PT Impaired   
muscle   
performance   
and/or ROM  
  
  
Disciplines:  
  
  
PT                                                    
  
  
2022                                  
  
  
Active                                    
  
  
1 goal linked to   
scheduled/document  
ed intervention                           
  
  
1 goal   
intervention   
scheduled/document  
ed in this visit  
   
                                                      
  
  
PT Impaired   
mobility  
  
  
Disciplines:  
  
  
PT                                                    
  
  
2022                                  
  
  
Active                                    
  
  
1 goal linked to   
scheduled/document  
ed intervention                           
  
  
1 goal   
intervention   
scheduled/document  
ed in this visit  
   
                                                      
  
  
PT Impaired gait  
  
  
Disciplines:  
  
  
PT                                                    
  
  
2022                                  
  
  
Active                                    
  
  
1 goal linked to   
scheduled/document  
ed intervention                           
  
  
1 goal   
intervention   
scheduled/document  
ed in this visit  
   
                                                      
  
  
PT Impaired   
balance  
  
  
Disciplines:  
  
  
PT                                                    
  
  
2022                                  
  
  
Active                                    
  
  
1 goal linked to   
scheduled/document  
ed intervention                           
  
  
1 goal   
intervention   
scheduled/document  
ed in this visit  
   
                                                      
  
  
PT Learning   
Assessment  
  
  
Disciplines:  
  
  
PT                                                    
  
  
2022                                  
  
  
Active                                    
  
  
1 goal linked to   
scheduled/document  
ed intervention                           
  
  
1 goal   
intervention   
scheduled/document  
ed in this visit  
   
                                                      
  
  
Medication   
Education  
  
  
Disciplines:  
  
  
Skilled Services                                      
  
  
2022                                  
  
  
Active                                    
  
  
1 goal linked to   
scheduled/document  
ed intervention                           
  
  
1 goal   
intervention   
scheduled/document  
ed in this visit  
   
                                                      
  
  
Sepsis  
  
  
Disciplines:  
  
  
Skilled Services                                      
  
  
2022                                  
  
  
Active                                    
  
  
1 goal linked to   
scheduled/document  
ed intervention                           
  
  
1 goal   
intervention   
scheduled/document  
ed in this visit  
   
                                                      
  
  
Physician   
Specific   
Parameters  
  
  
Disciplines:  
  
  
Skilled Services                                      
  
  
2022                                  
  
  
Active                                    
  
  
1 goal linked to   
scheduled/document  
ed intervention                           
  
  
1 goal   
intervention   
scheduled/document  
ed in this visit  
   
                                                      
  
  
Risk for Falls  
  
  
Disciplines:  
  
  
Skilled Services                                      
  
  
2022                                  
  
  
Active                                    
  
  
1 goal linked to   
scheduled/document  
ed intervention                           
  
  
1 goal   
intervention   
scheduled/document  
ed in this visit  
   
                                                      
  
  
Pain  
  
  
Disciplines:  
  
  
Skilled Services                                      
  
  
2022                                  
  
  
Active                                    
  
  
1 goal linked to   
scheduled/document  
ed intervention                           
  
  
1 goal   
intervention   
scheduled/document  
ed in this visit  
   
                                                      
  
  
Discharge  
  
  
Disciplines:  
  
  
Skilled Services                                      
  
  
2022                                  
  
  
Active                                    
  
  
1 goal linked to   
scheduled/document  
ed intervention                           
  
  
2 goal   
interventions   
scheduled/document  
ed in this visit  
  
  
                                      Goals  
  
                      Goal       Associated Problem Outcome    Goal Met?  Visit   
Notes  
   
                                                      
  
  
Improved Muscle Performance   
and/or ROM  
  
  
Description:  
  
  
LTG: Patient will demonstrate   
improved muscle performance   
to meet functional goals as   
evidenced by ability to   
tolerate 5 sit to stands in   
30 sec and tolerate 10 min of   
a standing activity, to be   
achieved by 10/1/22. .  
  
  
updated 22: to be   
achieved by 10/14/22  
  
  
  
  
  
STG: Patient and/or caregiver   
will verbalize/demonstrate   
independence with home   
exercise program, to improve   
functional mobility, to be   
achieved by 9/10/22.updated   
22: to be achieved by   
10/14/22  
  
  
  
  
  
  
  
  
LTG: Patient will demonstrate   
improved left hip active   
range of motion to 100* in   
standing degrees, to meet   
functional goals, to be   
achieved by 10/1/22.  
  
  
updated 22: to be   
achieved by 10/14/22                      
  
  
PT Impaired muscle   
performance and/or ROM                    
  
                                          
  
  
No                                        
   
                                                      
  
  
Improved Transfers  
  
  
Description:  
  
  
  
  
  
  
  
  
LTG: Patient will demonstrate   
safe transfers to/from bed,   
chair, toilet, shower/tub and   
car independently with AD, to   
be achieved by 10/1/22. .  
  
  
Updated 22: to be   
achieved by 10/14/22  
  
                                          
  
  
PT Impaired mobility                      
  
                                          
  
  
No                                        
   
                                                      
  
  
Improved Gait  
  
  
Description:  
  
  
  
  
  
LTG: Patient will demonstrate   
improved gait ability as   
evidenced by ambulation 150   
feet with rollator walker   
independently with AD, to   
return to safe household and   
community ambulation, in   
order to return to plf , to   
be achieved by 10/1/22. .  
  
  
update 22: ambulate for   
3 minutes with FWW mod indep   
indicating improved endurance   
with adequate foot clearance   
to be met 10/14/22  
  
                                          
  
  
PT Impaired gait                          
  
                                          
  
  
No                                        
   
                                                      
  
  
Improved Balance  
  
  
Description:  
  
  
  
  
  
LTG: Patient will demonstrate   
improved standing balance to   
meet functional goals as   
evidenced by TUG score of <28   
to be achieved by 10/1/22. .  
  
  
MET 22                               
  
  
PT Impaired balance                       
  
                                          
  
  
No                                        
   
                                                      
  
  
Demonstrate understanding of   
education  
  
  
Description:  
  
  
Patient and/or caregiver will   
understand educational   
instruction to be achieved by   
10/1/22. .  
  
  
updated 22: to be   
achieved by 10/14/22                      
  
  
PT Learning Assessment                    
  
                                          
  
  
No                                        
   
                                                      
  
  
Patient/caregiver will   
demonstrate ability to   
obtain, store, identify and   
administer ordered   
medications, keep accurate   
medication list in home, and   
adhere to medication schedule  
  
  
Description:  
  
  
Patient/caregiver will   
demonstrate ability to   
obtain, store, identify and   
administer ordered   
medications, keep accurate   
medication list in home, and   
adhere to medication schedule   
by 10/1/22.  
  
  
updated 22: to be   
achieved by 10/14/22                      
  
  
Medication Education                      
  
                                          
  
  
No                                        
   
                                                      
  
  
Patient/caregiver will be   
able to identify and report   
symptoms of sepsis  
  
  
Description:  
  
  
Patient/caregiver will be   
able to identify   
signs/symptoms of sepsis   
infection and will verbalize   
actions to take if suspected   
by 10/1/22.  
  
  
updated 22: to be   
achieved by 10/14/22.                     
  
  
Sepsis                                    
  
                                          
  
  
No                                        
   
                                                      
  
  
Patient to maintain   
parameters within   
physician-specified ranges   
throughout certification   
period                                    
  
  
Physician Specific   
Parameters                                
  
                                          
  
  
No                                        
   
                                                      
  
  
Manage Risk for falls  
  
  
Description:  
  
  
Patient/caregiver will   
verbalize knowledge of   
individualized fall   
prevention strategies by   
10/1/22.  
  
  
updated 22: to be   
achieved by 10/14/22                      
  
  
Risk for Falls                            
  
                                          
  
  
No                                        
   
                                                      
  
  
Manage Pain  
  
  
Description:  
  
  
Patient/caregiver will   
verbalize knowledge and   
understanding of appropriate   
techniques to control pain,   
including pain medication and   
non-pharmacological   
techniques. Patient will   
verbalize or demonstrate an   
acceptable level of pain as   
evidenced by a pain score of   
<5/10 and improvement in   
ability to perform activities   
of daily living to be   
achieved by 10/1/22.  
  
  
updated 22: to be   
achieved by 10/14/22                      
  
  
Pain                                      
  
                                          
  
  
No                                        
   
                                                      
  
  
Manage discharge planning  
  
  
Description:  
  
  
Patient/caregiver will   
verbalize understanding of   
ongoing discharge plan   
provided related to disease   
management, arrangements for   
outpatient and/or community   
services, obtaining   
medications, supplies, and   
DME, as needed throughout   
certification period.                     
  
  
Discharge                                 
  
                                          
  
  
No                                        
  
  
                                  Interventions  
  
                                        Intervention        Associated   
Problem/Goal        Status              Variance            Visit Notes  
   
                                                      
  
  
Physical Therapy   
Therapeutic Exercises                   Problem:PT Impaired   
muscle performance   
and/or ROM  
Goal:Improved Muscle   
Performance and/or   
ROM                                       
  
  
Completed                                 
  
                                          
  
  
patient instructed on   
strengthening   
exercises including   
seated: laq, hip   
flexion and adduction   
x's 15each. standing    
hip f;lexion and   
abduction, hamstring   
curls x's 10 each with   
verbal cues for   
posture and pacing .   
patient instructed to   
perform home exercise   
program twice a day   
which included above   
exercises .  
   
                                                      
  
  
Physical Therapy   
Transfer Training                       Problem:PT Impaired   
mobility  
Goal:Improved   
Transfers                                 
  
  
Completed                                 
  
                                          
  
  
Transfer training and   
instruction to patient   
on safe transfers to   
and from chair with   
supervision and verbal   
cues for reaching back   
for chair .  
   
                                                      
  
  
Physical Therapy Gait   
Training                                Problem:PT Impaired   
gait  
Goal:Improved Gait                        
  
  
Completed                                 
  
                                          
  
  
Gait training and   
instruction to patient   
on safe ambulation   
with rollator walker   
for 2x's 80 feet with   
supervision, with   
verbal cues for   
corrections of gait   
deviations including   
staying closer to   
walker for upright   
posture and safety.  
   
                                                      
  
  
Physical Therapy   
Balance Training                        Problem:PT Impaired   
balance  
Goal:Improved Balance                     
  
  
Completed                                 
  
                                          
  
  
Developed,   
implemented, and   
instructed patient on   
standing balance   
exercises including   
standing exercises .  
   
                                                      
  
  
Instruct and educate   
on knowledge deficits                   Problem:PT Learning   
Assessment  
Goal:Demonstrate   
understanding of   
education                                 
  
  
Completed                                 
  
                                          
  
  
patient verbalize   
and/or demonstrate   
understanding of   
physical therapy   
education including   
home exercise program.  
  
  
  
  
  
Education methods   
include: verbal cues   
and visual cues.  
  
  
  
  
  
Further education   
required to improve   
knowledge and   
compliance with home   
exercise program.  
   
                                                      
  
  
Medication Education  
  
  
Description:  
  
  
Evaluate/instruct   
patient/caregiver on   
obtaining, storing,   
identifying and   
administering ordered   
medications as well   
as keeping accurate   
medication list in   
the home and   
adhereing to   
medication schedule                     Problem:Medication   
Education  
Goal:Patient/caregive  
r will demonstrate   
ability to obtain,   
store, identify and   
administer ordered   
medications, keep   
accurate medication   
list in home, and   
adhere to medication   
schedule                                  
  
  
Completed                                 
  
                                          
  
  
Patient instructed on   
importance of keeping   
accurate medication   
list in home and   
adhering to medication   
schedule.  
   
                                                      
  
  
Risk of Sepsis  
  
  
Description:  
  
  
Patient is at risk   
for sepsis. Monitor   
closely for s/s of   
sepsis.                                 Problem:Sepsis  
Goal:Patient/caregive  
r will be able to   
identify and report   
symptoms of sepsis                        
  
  
Completed                                 
  
                                          
   
                                                      
  
  
SPO2  
  
  
Description:  
  
  
Notify Dr. Ahuja   
if pulse ox is <92%   
at rest.                                Problem:Physician   
Specific Parameters  
Goal:Patient to   
maintain parameters   
within   
physician-specified   
ranges throughout   
certification period                      
  
  
Completed                                 
  
                                          
   
                                                      
  
  
Instruct on   
individual fall risk   
factors and   
strategies to prevent   
falls and injuries   
caused by falls.                        Problem:Risk for   
Falls  
Goal:Manage Risk for   
falls                                     
  
  
Completed                                 
  
                                          
  
  
PT: Patient instructed   
on Managing Impaired   
Functional Mobility:   
Use assistive   
device(s): front   
wheeled walker and   
rollator walker  
  
  
   
                                                      
  
  
Instruct on pain and   
instruct on   
strategies to control   
pain                                    Problem:Pain  
Goal:Manage Pain                          
  
  
Completed                                 
  
                                          
  
  
patient instructed on   
techniques to control   
pain including   
Pharmacological   
measures and   
Non-Pharmacological   
measures; rest.  
   
                                                      
  
  
Deliver [facility]                           Problem:Discharge  
Goal:Manage discharge   
planning                                  
  
  
Completed                                 
  
                                          
  
  
Delivered [facility] on   
10/10/22 for discharge   
date of 10./12/22.   
Patient denies   
questions/concerns   
w/form  
   
                                                      
  
  
Instruct on ongoing   
discharge plan                          Problem:Discharge  
Goal:Manage discharge   
planning                                  
  
  
Completed                                 
  
                                          
  
  
Ongoing Discharge   
plan: Discharge plan   
discussed with patient   
including frequency   
and duration for home   
PT and plan for   
transition to:   
caregiver assistance.  
  
  
  
documented in this encounter  
[facility]'s home Plan of care note* Visit Details  
  
                                                    Visit Type -PT AGENCY DC [facility]  
ISIT  
Discipline -Physical Therapy  
  
  
                                    Problems  
  
                    Problem   Description Start Date Status    Goals     Interve  
ntions  
   
                                                      
  
  
PT Impaired   
muscle   
performance   
and/or ROM  
  
  
Disciplines:  
  
  
PT                                                    
  
  
2022                                  
  
  
Resolved on   
10/12/2022                                
  
  
1 goal linked to   
scheduled/documen  
[facility] intervention                          
  
  
1 goal   
intervention   
scheduled/document  
ed in this visit  
   
                                                      
  
  
PT Impaired   
mobility  
  
  
Disciplines:  
  
  
PT                                                    
  
  
2022                                  
  
  
Resolved on   
10/12/2022                                
  
  
1 goal linked to   
scheduled/documen  
[facility] intervention                          
  
  
1 goal   
intervention   
scheduled/document  
ed in this visit  
   
                                                      
  
  
PT Impaired gait  
  
  
Disciplines:  
  
  
PT                                                    
  
  
2022                                  
  
  
Resolved on   
10/12/2022                                
  
  
1 goal linked to   
scheduled/documen  
[facility] intervention                          
  
  
1 goal   
intervention   
scheduled/document  
ed in this visit  
   
                                                      
  
  
PT Impaired   
balance  
  
  
Disciplines:  
  
  
PT                                                    
  
  
2022                                  
  
  
Resolved on   
10/12/2022                                
  
  
1 goal linked to   
scheduled/documen  
[facility] intervention                          
  
  
1 goal   
intervention   
scheduled/document  
ed in this visit  
   
                                                      
  
  
PT Learning   
Assessment  
  
  
Disciplines:  
  
  
PT                                                    
  
  
2022                                  
  
  
Resolved on   
10/12/2022                                
  
  
1 goal linked to   
scheduled/documen  
[facility] intervention                          
  
  
1 goal   
intervention   
scheduled/document  
ed in this visit  
   
                                                      
  
  
Medication   
Education  
  
  
Disciplines:  
  
  
Skilled Services                                      
  
  
2022                                  
  
  
Resolved on   
10/12/2022                                
  
  
1 goal linked to   
scheduled/documen  
[facility] intervention                          
  
  
1 goal   
intervention   
scheduled/document  
ed in this visit  
   
                                                      
  
  
Sepsis  
  
  
Disciplines:  
  
  
Skilled Services                                      
  
  
2022                                  
  
  
Resolved on   
10/12/2022                                
  
  
1 goal linked to   
scheduled/documen  
[facility] intervention                          
  
  
1 goal   
intervention   
scheduled/document  
ed in this visit  
   
                                                      
  
  
Physician   
Specific   
Parameters  
  
  
Disciplines:  
  
  
Skilled Services                                      
  
  
2022                                  
  
  
Resolved on   
10/12/2022                                
  
  
1 goal linked to   
scheduled/documen  
[facility] intervention                          
  
  
1 goal   
intervention   
scheduled/document  
ed in this visit  
   
                                                      
  
  
Risk for Falls  
  
  
Disciplines:  
  
  
Skilled Services                                      
  
  
2022                                  
  
  
Resolved on   
10/12/2022                                
  
  
1 goal linked to   
scheduled/documen  
[facility] intervention                          
  
  
1 goal   
intervention   
scheduled/document  
ed in this visit  
   
                                                      
  
  
Pain  
  
  
Disciplines:  
  
  
Skilled Services                                      
  
  
2022                                  
  
  
Resolved on   
10/12/2022                                
  
  
1 goal linked to   
scheduled/documen  
[facility] intervention                          
  
  
1 goal   
intervention   
scheduled/document  
ed in this visit  
   
                                                      
  
  
Discharge  
  
  
Disciplines:  
  
  
Skilled Services                                      
  
  
2022                                  
  
  
Resolved on   
10/12/2022                                
  
  
1 goal linked to   
scheduled/documen  
[facility] intervention                          
  
  
1 goal   
intervention   
scheduled/document  
ed in this visit  
  
  
                                      Goals  
  
                      Goal       Associated Problem Outcome    Goal Met?  Visit   
Notes  
   
                                                      
  
  
Improved Muscle Performance   
and/or ROM  
  
  
Description:  
  
  
LTG: Patient will   
demonstrate improved muscle   
performance to meet   
functional goals as   
evidenced by ability to   
tolerate 5 sit to stands in   
30 sec and tolerate 10 min   
of a standing activity, to   
be achieved by 10/1/22. .  
  
  
updated 22: to be   
achieved by 10/14/22  
  
  
  
  
  
STG: Patient and/or   
caregiver will   
verbalize/demonstrate   
independence with home   
exercise program, to   
improve functional   
mobility, to be achieved by   
9/10/22.updated 22: to   
be achieved by 10/14/22  
  
  
  
  
  
  
  
  
LTG: Patient will   
demonstrate improved left   
hip active range of motion   
to 100* in standing   
degrees, to meet functional   
goals, to be achieved by   
10/1/22.  
  
  
updated 22: to be   
achieved by 10/14/22                      
  
  
PT Impaired muscle   
performance and/or ROM                    
  
  
Completed                                 
  
  
Yes                                       
   
                                                      
  
  
Improved Transfers  
  
  
Description:  
  
  
  
  
  
  
  
  
LTG: Patient will   
demonstrate safe transfers   
to/from bed, chair, toilet,   
shower/tub and car   
independently with AD, to   
be achieved by 10/1/22. .  
  
  
Updated 22: to be   
achieved by 10/14/22  
  
                                          
  
  
PT Impaired mobility                      
  
  
Completed                                 
  
  
Yes                                       
   
                                                      
  
  
Improved Gait  
  
  
Description:  
  
  
  
  
  
LTG: Patient will   
demonstrate improved gait   
ability as evidenced by   
ambulation 150 feet with   
rollator walker   
independently with AD, to   
return to safe household   
and community ambulation,   
in order to return to plf ,   
to be achieved by 10/1/22.   
.  
  
  
update 22: ambulate   
for 3 minutes with FWW mod   
indep indicating improved   
endurance with adequate   
foot clearance to be met   
10/14/22  
  
                                          
  
  
PT Impaired gait                          
  
  
Completed                                 
  
  
Yes                                       
   
                                                      
  
  
Improved Balance  
  
  
Description:  
  
  
  
  
  
LTG: Patient will   
demonstrate improved   
standing balance to meet   
functional goals as   
evidenced by TUG score of   
<28 to be achieved by   
10/1/22. .  
  
  
MET 22                               
  
  
PT Impaired balance                       
  
  
Completed                                 
  
  
Yes                                       
   
                                                      
  
  
Demonstrate understanding   
of education  
  
  
Description:  
  
  
Patient and/or caregiver   
will understand educational   
instruction to be achieved   
by 10/1/22. .  
  
  
updated 22: to be   
achieved by 10/14/22                      
  
  
PT Learning Assessment                    
  
  
Completed                                 
  
  
Yes                                       
   
                                                      
  
  
Patient/caregiver will   
demonstrate ability to   
obtain, store, identify and   
administer ordered   
medications, keep accurate   
medication list in home,   
and adhere to medication   
schedule  
  
  
Description:  
  
  
Patient/caregiver will   
demonstrate ability to   
obtain, store, identify and   
administer ordered   
medications, keep accurate   
medication list in home,   
and adhere to medication   
schedule by 10/1/22.  
  
  
updated 22: to be   
achieved by 10/14/22                      
  
  
Medication Education                      
  
  
Completed                                 
  
  
Yes                                       
   
                                                      
  
  
Patient/caregiver will be   
able to identify and report   
symptoms of sepsis  
  
  
Description:  
  
  
Patient/caregiver will be   
able to identify   
signs/symptoms of sepsis   
infection and will   
verbalize actions to take   
if suspected by 10/1/22.  
  
  
updated 22: to be   
achieved by 10/14/22.                     
  
  
Sepsis                                    
  
  
Completed                                 
  
  
Yes                                       
   
                                                      
  
  
Patient to maintain   
parameters within   
physician-specified ranges   
throughout certification   
period                                    
  
  
Physician Specific   
Parameters                                
  
  
Completed                                 
  
  
Yes                                       
   
                                                      
  
  
Manage Risk for falls  
  
  
Description:  
  
  
Patient/caregiver will   
verbalize knowledge of   
individualized fall   
prevention strategies by   
10/1/22.  
  
  
updated 22: to be   
achieved by 10/14/22                      
  
  
Risk for Falls                            
  
  
Completed                                 
  
  
Yes                                       
   
                                                      
  
  
Manage Pain  
  
  
Description:  
  
  
Patient/caregiver will   
verbalize knowledge and   
understanding of   
appropriate techniques to   
control pain, including   
pain medication and   
non-pharmacological   
techniques. Patient will   
verbalize or demonstrate an   
acceptable level of pain as   
evidenced by a pain score   
of <5/10 and improvement in   
ability to perform   
activities of daily living   
to be achieved by 10/1/22.  
  
  
updated 22: to be   
achieved by 10/14/22                      
  
  
Pain                                      
  
  
Completed                                 
  
  
Yes                                       
   
                                                      
  
  
Manage discharge planning  
  
  
Description:  
  
  
Patient/caregiver will   
verbalize understanding of   
ongoing discharge plan   
provided related to disease   
management, arrangements   
for outpatient and/or   
community services,   
obtaining medications,   
supplies, and DME, as   
needed throughout   
certification period.                     
  
  
Discharge                                 
  
  
Completed                                 
  
  
Yes                                       
  
  
                                  Interventions  
  
                                        Intervention        Associated   
Problem/Goal        Status              Variance            Visit Notes  
   
                                                      
  
  
Physical Therapy   
Therapeutic Exercises                   Problem:PT Impaired   
muscle performance   
and/or ROM  
Goal:Improved Muscle   
Performance and/or   
ROM                                       
  
  
Completed                                 
  
                                          
  
  
patient instructed on   
strengthening   
exercises including   
seated: laq, hip   
flexion and adduction   
x's 15each. standing    
hip f;lexion and   
abduction, hamstring   
curls x's 10 each with   
verbal cues for   
posture and pacing .   
patient instructed to   
perform home exercise   
program twice a day   
which included above   
exercises  
  
  
   
                                                      
  
  
Physical Therapy   
Transfer Training                       Problem:PT Impaired   
mobility  
Goal:Improved   
Transfers                                 
  
  
Completed                                 
  
                                          
  
  
Transfers are DARIUS   
with safe/proper   
technique  
   
                                                      
  
  
Physical Therapy Gait   
Training                                Problem:PT Impaired   
gait  
Goal:Improved Gait                        
  
  
Completed                                 
  
                                          
  
  
Indep amb with WW with   
steady recip pattern,   
pt must make an effort   
to  his toes ,   
has no toe off  
   
                                                      
  
  
Physical Therapy   
Balance Training                        Problem:PT Impaired   
balance  
Goal:Improved Balance                     
  
  
Completed                                 
  
                                          
  
  
pt demonstrates   
improved dynamic   
standing balance as   
evidenced by a tug of   
28  
   
                                                      
  
  
Instruct and educate   
on knowledge deficits                   Problem:PT Learning   
Assessment  
Goal:Demonstrate   
understanding of   
education                                 
  
  
Completed                                 
  
                                          
  
  
patient verbalize   
and/or demonstrate   
understanding of   
physical therapy   
education including   
pain management, fall   
prevention strategies,   
home safety,   
functional activity   
and home exercise   
program.  
  
  
  
  
  
Education methods   
include: verbal cues.  
  
  
  
  
  
   
                                                      
  
  
Medication Education  
  
  
Description:  
  
  
Evaluate/instruct   
patient/caregiver on   
obtaining, storing,   
identifying and   
administering ordered   
medications as well   
as keeping accurate   
medication list in   
the home and   
adhereing to   
medication schedule                     Problem:Medication   
Education  
Goal:Patient/caregive  
r will demonstrate   
ability to obtain,   
store, identify and   
administer ordered   
medications, keep   
accurate medication   
list in home, and   
adhere to medication   
schedule                                  
  
  
Completed                                 
  
                                          
  
  
Patient instructed on   
importance of keeping   
accurate medication   
list in home and   
adhering to medication   
schedule.  
   
                                                      
  
  
Risk of Sepsis  
  
  
Description:  
  
  
Patient is at risk   
for sepsis. Monitor   
closely for s/s of   
sepsis.                                 Problem:Sepsis  
Goal:Patient/caregive  
r will be able to   
identify and report   
symptoms of sepsis                        
  
  
Completed                                 
  
                                          
   
                                                      
  
  
SPO2  
  
  
Description:  
  
  
Notify Dr. Ahuja   
if pulse ox is <92%   
at rest.                                Problem:Physician   
Specific Parameters  
Goal:Patient to   
maintain parameters   
within   
physician-specified   
ranges throughout   
certification period                      
  
  
Completed                                 
  
                                          
   
                                                      
  
  
Instruct on   
individual fall risk   
factors and   
strategies to prevent   
falls and injuries   
caused by falls.                        Problem:Risk for   
Falls  
Goal:Manage Risk for   
falls                                     
  
  
Completed                                 
  
                                          
  
  
PT: Patient instructed   
on  
  
  
Eliminating   
Environmental Hazards:   
Keep pathways clear,   
Keep pets out of   
pathways and Remove   
unsafe rugs  
  
  
Managing Impaired   
Functional Mobility:   
Use assistive   
device(s): front   
wheeled walker  
  
  
Managing Pain  
  
  
  
  
  
   
                                                      
  
  
Instruct on pain and   
instruct on   
strategies to control   
pain                                    Problem:Pain  
Goal:Manage Pain                          
  
  
Completed                                 
  
                                          
  
  
patient instructed on   
techniques to control   
pain including   
Pharmacological   
measures and   
Non-Pharmacological   
measures; rest and   
positioning/elevation.  
   
                                                      
  
  
Instruct on final   
discharge plan and   
deliver discharge   
instructions                            Problem:Discharge  
Goal:Manage discharge   
planning                                  
  
  
Completed                                 
  
                                          
  
  
Delivered Discharge   
plan: Discharge plan   
discussed with patient  
  
  
for plan for   
transition to: live at   
home with community   
assistance  
  
  
  
documented in this encounter  
[facility] Diagnostic Procedure Only 
  (Routine) - Closed  
  
                          Specialty    Diagnoses / Procedures Referred By Contac  
t Referred To Contact  
   
                                        [facility]            
  
  
Diagnoses  
  
  
Monoclonal gammopathy  
  
  
  
Procedures  
  
  
XR BONE SURVEY ROUTINE  
  
  
RADIOLOGIC EXAMINATION   
OSSEOUS SURVEY COMPL                      
  
  
Tello Ortega DO  
  
  
[address] [place] [address]  
  
  
Phone: [phone]  
  
  
Fax: [phone]                         
  
  
[facility]  
  
  
  
                    Referral ID Status    Reason    Start Date Expiration Date V  
isits   
Requested                               Visits   
Authorized  
   
                                [id number]        Closed            
  
  
Auto-Generate  
d Referral      2022        1               1  
  
  
  
  
Electronically signed by Tello Ortega DO at 2022 3:40 PM EST  
  
  
* Diagnostic Procedure Only (Routine) - Authorized  
  
                          Specialty    Diagnoses / Procedures Referred By Contac  
t Referred To Contact  
   
                                        [facility]            
  
  
Diagnoses  
  
  
Monoclonal gammopathy  
  
  
Macrocytic anemia  
  
  
Thrombocytopenia (HCC)  
  
  
  
Procedures  
  
  
US ABD RT UPPER QUADRANT  
  
  
US ABDOMINAL REAL TIME   
W/IMAGE LIMITED                           
  
  
Tello Ortega DO  
  
  
[address] [place] [address]  
  
  
Phone: [phone]  
  
  
Fax: [phone]                         
  
  
[facility]  
  
  
  
                          Referral ID  Status       Reason       Start   
Date                                    Expiration   
Date                                    Visits   
Requested                               Visits   
Authorized  
   
                                [id number]        Authorized        
  
  
Auto-Generat  
ed Referral     2022        1               1  
  
  
  
  
Electronically signed by Tello Ortega DO at 2022 3:29 PM EST  
  
  
[facility] for referral (narrative)* Outpatient Procedure (Routine) 
  - Authorized  
  
                          Specialty    Diagnoses / Procedures Referred By Contac  
t Referred To Contact  
   
                                                    [facility]   
[place]                                 
  
  
Diagnoses  
  
  
Melena  
  
  
  
Procedures  
  
  
EGD - THERAPEUTIC, EUS,   
OR TUBE INTERVENTIONS  
  
  
EGD BAND LIGATION   
ESOPHGEAL/GASTRIC   
VARICES                                   
  
  
Juanito Kaufman MD  
  
  
[address] [place] [address]  
  
  
Phone: [phone]  
  
  
Fax: [phone]                         
  
  
[facility]   
[place] [id number]  
  
  
  
                          Referral ID  Status       Reason       Start   
Date                                    Expiration   
Date                                    Visits   
Requested                               Visits   
Authorized  
   
                                [id number]        Authorized        
  
  
Auto-Generat  
ed Referral                             10/19/202  
3                   10/19/2024          1                   1  
  
  
  
  
Electronically signed by Juanito Kaufman MD at 10/19/2023 2:42 PM EDT  
  
  
[facility] for referral (narrative)* Diagnostic Procedure Only 
  (Routine) - Closed  
  
                          Specialty    Diagnoses / Procedures Referred By Contac  
t Referred To Contact  
   
                                        [facility]            
  
  
Diagnoses  
  
  
Monoclonal gammopathy  
  
  
Macrocytic anemia  
  
  
Thrombocytopenia (HCC)  
  
  
  
Procedures  
  
  
US ABD RT UPPER QUADRANT  
  
  
US ABDOMINAL REAL TIME   
W/IMAGE LIMITED                           
  
  
Tello Ortega DO  
  
  
[address] [place] [address]  
  
  
Phone: [phone]  
  
  
Fax: [phone]                         
  
  
[facility]  
  
  
[facility]  
  
  
  
                    Referral ID Status    Reason    Start Date Expiration Date V  
isits   
Requested                               Visits   
Authorized  
   
                                [id number]        Closed            
  
  
Auto-Generate  
d Referral      2022        1               1  
  
  
  
  
Electronically signed by Tello Ortega DO at 2022 8:45 AM [facility]  
  
Summary Purpose  
  
  
                                                      
  
  
  
Family History  
No Family History Records FoundNo Family History Records FoundNo Family History 
Records FoundNo Family History Records FoundNo Family History Records FoundNo 
Family History Records Found  
  
Advance Directives  
No Advanced Directives Records FoundDocuments on File  
  
                          Type         Date Recorded Patient Representative Expl  
anation  
   
                          Advance Directive(s) 2020 9:27 AM                
   
                          Advance Directive(s) 2020 5:47 PM                
   
                          Advance Directive(s) 2018 11:22 AM                
  
                                Documents on File  
  
                          Type         Date Recorded Patient Representative Expl  
anation  
   
                          Advance Directive(s) 2020 9:27 AM                
   
                          Advance Directive(s) 2020 5:47 PM                
   
                          Advance Directive(s) 2018 11:22 AM                
  
                           Latest Code Status on File  
  
                          Code Status  Date Activated Date Inactivated Comments  
   
                          Full Code    2022 7:21 PM                
  
                           Latest Code Status on File  
  
                          Code Status  Date Activated Date Inactivated Comments  
   
                          Full Code    2022 7:21 PM                
  
                           Latest Code Status on File  
  
                          Code Status  Date Activated Date Inactivated Comments  
   
                          Full Code    2022 7:21 PM 2023 2:18 AM   
  
                           Latest Code Status on File  
  
                          Code Status  Date Activated Date Inactivated Comments  
   
                          Full Code    2022 7:21 PM 2023 2:18 AM   
  
                           Latest Code Status on File  
  
                          Code Status  Date Activated Date Inactivated Comments  
   
                          Full Code    2022 7:21 PM 2023 2:18 AM   
  
                           Latest Code Status on File  
  
                          Code Status  Date Activated Date Inactivated Comments  
   
                          Full Code    2022 7:21 PM 2023 2:18 AM   
  
  
  
Reason for Referral  
  
  
                          Specialty    Diagnoses / Procedures Referred By Contac  
t Referred To Contact  
   
                                        Gastroenterology      
  
  
Diagnoses  
  
  
Adenomatous polyp of colon,   
unspecified part of colon  
  
  
  
Procedures  
  
  
CONSULT TO GASTROENTEROLOGY               
  
  
Júnior Ahuja MD  
  
  
[address] [place] [address]  
  
  
Phone: [phone]  
  
  
Fax: [phone]                         
  
  
  
  
  
                          Referral ID  Status       Reason       Start   
Date                                    Expiration   
Date                                    Visits   
Requested                               Visits   
Authorized  
   
                                        [id number]            Ref Not   
Required                                  
  
  
PCP Requested   
Referral        2022       1               1  
  
  
  
                          Specialty    Diagnoses / Procedures Referred By Contac  
t Referred To Contact  
   
                                                              
  
  
Diagnoses  
  
  
Peripheral polyneuropathy  
  
  
Frequent falls  
  
  
Contusion of hip,   
unspecified laterality,   
subsequent encounter  
  
  
Muscular weakness  
  
  
Abnormality of gait  
  
  
  
Procedures  
  
  
CONSULT TO [facility] AT HOME                            
  
  
Júnior Ahuja MD  
  
  
[address] [place] [address]  
  
  
Phone: [phone]  
  
  
Fax: [phone]                         
  
  
Home Care  
  
  
[address] [place] [address]  
  
  
Phone: [phone]  
  
  
  
                          Referral ID  Status       Reason       Start   
Date                                    Expiration   
Date                                    Visits   
Requested                               Visits   
Authorized  
   
                                [id number]        Authorized        
  
  
PCP Requested   
Referral        2022      1               1  
  
  
  
                          Specialty    Diagnoses / Procedures Referred By Contac  
t Referred To Contact  
   
                                                              
  
  
Diagnoses  
  
  
Frequent falls  
  
  
Peripheral polyneuropathy  
  
  
  
Procedures  
  
  
CONSULT TO NEUROLOGY                      
  
  
Júnior Ahuja MD  
  
  
[address] [place] [address]  
  
  
Phone: [phone]  
  
  
Fax: [phone]                         
  
  
  
  
  
                          Referral ID  Status       Reason       Start   
Date                                    Expiration   
Date                                    Visits   
Requested                               Visits   
Authorized  
   
                                        [id number]            Ref Not   
Required                                  
  
  
PCP Requested   
Referral        10/5/2022       10/3/2023       1               1  
  
  
  
                          Specialty    Diagnoses / Procedures Referred By Contac  
t Referred To Contact  
   
                                                    [facility]                               
  
  
Diagnoses  
  
  
Peripheral   
polyneuropathy  
  
  
Abnormality of gait  
  
  
  
Procedures  
  
  
CONSULT TO OCCUPATIONAL   
THER  
  
  
OCCUPATIONAL THERAPY   
EVAL HIGH COMPLEX 60   
MINS                                      
  
  
Júnior Ahuja MD  
  
  
[address] [place] [address]  
  
  
Phone: [phone]  
  
  
Fax: [phone]                         
  
  
[facility]   
[facility]  
  
  
[address] [place] [address]  
  
  
  
                          Referral ID  Status       Reason       Start   
Date                                    Expiration   
Date                                    Visits   
Requested                               Visits   
Authorized  
   
                                [id number]        Authorized        
  
  
PCP Requested   
Referral  
  
  
Auto-Generate  
d Referral                              10/17/202  
2                   10/17/2023          99                  99  
  
  
  
                          Specialty    Diagnoses / Procedures Referred By Contac  
t Referred To Contact  
   
                                        [facility]            
  
  
Diagnoses  
  
  
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   
ascites, unspecified hepatic   
cirrhosis type (HCC)  
  
  
Liver mass  
  
  
Splenomegaly  
  
  
  
Procedures  
  
  
MRI LIVER WO/W IVCON  
  
  
MRI ABDOMEN W/O & W/CONTRAST   
MATERIAL                                  
  
  
Maurizio Og PA-C  
  
  
[address] [place] [address]  
  
  
Phone: [phone]  
  
  
Fax: [phone]                         
  
  
[facility]  
  
  
  
                          Referral ID  Status       Reason       Start   
Date                                    Expiration   
Date                                    Visits   
Requested                               Visits   
Authorized  
   
                                        [id number]            Pending   
Review                                    
  
  
Auto-Generat  
ed Referral     2023       1               1  
  
  
  
                    Referral ID Status    Reason    Start Date Expiration Date V  
isits   
Requested                               Visits   
Authorized  
   
                                [id number]        Closed            
  
  
Auto-Generate  
d Referral      2023       1               1  
  
  
  
                          Specialty    Diagnoses / Procedures Referred By Contac  
t Referred To Contact  
   
                                                              
  
  
Diagnoses  
  
  
Hepatocellular carcinoma   
(HCC)  
  
  
  
Procedures  
  
  
CT SIM PLANNING RADIATION   
ONCOLOGY  
  
  
THER RAD SIMULAJ-AIDED FIELD   
SETTING COMPLEX                           
  
  
Marin Fish MD  
  
  
[address] [place] [address]  
  
  
Phone: [phone]  
  
  
Fax: [phone]                         
  
  
  
  
  
                          Referral ID  Status       Reason       Start   
Date                                    Expiration   
Date                                    Visits   
Requested                               Visits   
Authorized  
   
                                        [id number]            Pending   
Review                                    
  
  
PCP Requested   
Referral        2023       1               1  
  
  
  
                          Specialty    Diagnoses / Procedures Referred By Contac  
t Referred To Contact  
   
                                        [facility]            
  
  
Diagnoses  
  
  
Liver disease  
  
  
  
Procedures  
  
  
MRI LIVER WO/W IVCON  
  
  
MRI ABDOMEN W/O &   
W/CONTRAST MATERIAL                       
  
  
Marin Fish MD  
  
  
[address] [place] [address]  
  
  
Phone: [phone]  
  
  
Fax: [phone]                         
  
  
[facility]  
  
  
[place] [address]  
  
  
  
                    Referral ID Status    Reason    Start Date Expiration Date V  
isits   
Requested                               Visits   
Authorized  
   
                                [id number]        Closed            
  
  
Auto-Generate  
d Referral      2023       1               1  
  
  
  
Medications Administered Section  
     Inactive Administered Medications - up to 3 most recent administrations  
  
                    Medication Order MAR Action Action Date Dose      Rate        
Site  
   
                                                      
  
  
iron sucrose 200 mg in   
NaCl 0.9% 100ml   
(VENOFER)  
  
  
200 mg, INTRAVENOUS, at   
400 mL/hr, Administer   
over 15 Minutes, ONCE, 1   
dose, On 23 at   
1500, Please conduct a   
30 minute post dose   
observation.                            New   
Bag/Syringe/Bottle                      2023 3:11 PM   
EST                 200 mg              400 mL/hr             
   
                                                               
  
     Inactive Administered Medications - up to 3 most recent administrations  
  
                    Medication Order MAR Action Action Date Dose      Rate        
Site  
   
                                                      
  
  
iron sucrose 200 mg in   
NaCl 0.9% 100ml   
(VENOFER)  
  
  
200 mg, INTRAVENOUS, at   
400 mL/hr, Administer   
over 15 Minutes, ONCE, 1   
dose, On 23 at   
1500, Please conduct a   
30 minute post dose   
observation.                            New   
Bag/Syringe/Bottle                      2023 3:10 PM   
EST                 200 mg              400 mL/hr             
   
                                                               
  
     Inactive Administered Medications - up to 3 most recent administrations  
  
                    Medication Order MAR Action Action Date Dose      Rate        
Site  
   
                                                      
  
  
iron sucrose 200 mg in   
NaCl 0.9% 100ml   
(VENOFER)  
  
  
200 mg, INTRAVENOUS, at   
400 mL/hr, Administer   
over 15 Minutes, ONCE, 1   
dose, On 23 at   
1530, Please conduct a   
30 minute post dose   
observation.                            New   
Bag/Syringe/Bottle                      2023 3:45 PM   
EST                 200 mg              400 mL/hr             
   
                                                               
  
     Inactive Administered Medications - up to 3 most recent administrations  
  
                    Medication Order MAR Action Action Date Dose      Rate        
Site  
   
                                                      
  
  
iron sucrose 200 mg in   
NaCl 0.9% 100ml   
(VENOFER)  
  
  
200 mg, INTRAVENOUS, at   
400 mL/hr, Administer   
over 15 Minutes, ONCE, 1   
dose, On 23 at   
1530, Please conduct a   
30 minute post dose   
observation.                            New   
Bag/Syringe/Bottle                      2023 3:14 PM   
EST                 200 mg              400 mL/hr             
   
                                                               
  
     Inactive Administered Medications - up to 3 most recent administrations  
  
                    Medication Order MAR Action Action Date Dose      Rate        
Site  
   
                                                      
  
  
iron sucrose 200 mg in   
NaCl 0.9% 100ml   
(VENOFER)  
  
  
200 mg, INTRAVENOUS, at   
400 mL/hr, Administer   
over 15 Minutes, ONCE, 1   
dose, On 23 at   
1530, Please conduct a   
30 minute post dose   
observation.                            New   
Bag/Syringe/Bottle                      2023 3:14 PM   
EST                 200 mg              400 mL/hr             
   
                                                               
  
     Inactive Administered Medications - up to 3 most recent administrations  
  
                    Medication Order MAR Action Action Date Dose      Rate        
Site  
   
                                                      
  
  
iron sucrose 200 mg in   
NaCl 0.9% 100ml   
(VENOFER)  
  
  
200 mg, INTRAVENOUS, at   
400 mL/hr, Administer   
over 15 Minutes, ONCE, 1   
dose, On Fri 2/10/23 at   
1500, Please conduct a   
30 minute post dose   
observation.                            New   
Bag/Syringe/Bottle                      02/10/2023 2:46 PM   
EST                 200 mg              400 mL/hr             
   
                                                               
  
     Inactive Administered Medications - up to 3 most recent administrations  
  
                    Medication Order MAR Action Action Date Dose      Rate        
Site  
   
                                                      
  
  
iron sucrose 200 mg in   
NaCl 0.9% 100ml   
(VENOFER)  
  
  
200 mg, INTRAVENOUS, at   
400 mL/hr, Administer   
over 15 Minutes, ONCE, 1   
dose, On 23 at   
1600, Please conduct a   
30 minute post dose   
observation.                            New   
Bag/Syringe/Bottle                      2023 3:50 PM   
EST                 200 mg              400 mL/hr             
   
                                                               
  
     Inactive Administered Medications - up to 3 most recent administrations  
  
                    Medication Order MAR Action Action Date Dose      Rate        
Site  
   
                                                      
  
  
iron sucrose 200 mg in   
NaCl 0.9% 100ml   
(VENOFER)  
  
  
200 mg, INTRAVENOUS, at   
400 mL/hr, Administer   
over 15 Minutes, ONCE, 1   
dose, On Wed 2/15/23 at   
1530, Please conduct a   
30 minute post dose   
observation.                            New   
Bag/Syringe/Bottle                      02/15/2023 3:24 PM   
EST                 200 mg              400 mL/hr             
   
                                                               
  
     Inactive Administered Medications - up to 3 most recent administrations  
  
                    Medication Order MAR Action Action Date Dose      Rate        
Site  
   
                                                      
  
  
iron sucrose 200 mg in   
NaCl 0.9% 100ml   
(VENOFER)  
  
  
200 mg, INTRAVENOUS, at   
400 mL/hr, Administer   
over 15 Minutes, ONCE, 1   
dose, On 23 at   
1000, Please conduct a   
30 minute post dose   
observation.                            New   
Bag/Syringe/Bottle                      2023 9:44 AM   
EDT                 200 mg              400 mL/hr             
   
                                                               
  
     Inactive Administered Medications - up to 3 most recent administrations  
  
                    Medication Order MAR Action Action Date Dose      Rate        
Site  
   
                                                      
  
  
iron sucrose 200 mg in   
NaCl 0.9% 100ml   
(VENOFER)  
  
  
200 mg, INTRAVENOUS, at   
400 mL/hr, Administer   
over 15 Minutes, ONCE, 1   
dose, On 23 at   
1000, Please conduct a   
30 minute post dose   
observation.                            New   
Bag/Syringe/Bottle                      2023 10:10 AM   
EDT                 200 mg              400 mL/hr             
   
                                                               
  
     Inactive Administered Medications - up to 3 most recent administrations  
  
                    Medication Order MAR Action Action Date Dose      Rate        
Site  
   
                                                      
  
  
iron sucrose 200 mg in   
NaCl 0.9% 100ml   
(VENOFER)  
  
  
200 mg, INTRAVENOUS, at   
400 mL/hr, Administer   
over 15 Minutes, ONCE, 1   
dose, On 23 at   
1000, Please conduct a   
30 minute post dose   
observation.                            New   
Bag/Syringe/Bottle                      2023 10:06 AM   
EDT                 200 mg              400 mL/hr             
   
                                                               
  
     Inactive Administered Medications - up to 3 most recent administrations  
  
                    Medication Order MAR Action Action Date Dose      Rate        
Site  
   
                                                      
  
  
iron sucrose 200 mg in   
NaCl 0.9% 100ml   
(VENOFER)  
  
  
200 mg, INTRAVENOUS, at   
400 mL/hr, Administer   
over 15 Minutes, ONCE, 1   
dose, On 23 at   
1200, Please conduct a   
30 minute post dose   
observation.                            New   
Bag/Syringe/Bottle                      2023 11:55 AM   
EDT                 200 mg              400 mL/hr             
   
                                                               
  
     Inactive Administered Medications - up to 3 most recent administrations  
  
                    Medication Order MAR Action Action Date Dose      Rate        
Site  
   
                                                      
  
  
iron sucrose 200 mg in   
NaCl 0.9% 100ml   
(VENOFER)  
  
  
200 mg, INTRAVENOUS, at   
400 mL/hr, Administer   
over 15 Minutes, ONCE, 1   
dose, On 23 at   
0800, Please conduct a   
30 minute post dose   
observation.                            New   
Bag/Syringe/Bottle                      2023 9:55 AM   
EDT                 200 mg              400 mL/hr             
   
                                                               
  
  
  
Additional Source Comments  
  
  
  
                                                    PREGNANCY (unrecognized sect  
ion and content)  
   
                                                    No Pregnancy Status Records   
FoundNo Pregnancy Status Records FoundNo Pregnancy   
Status   
Records FoundNo Pregnancy Status Records FoundNo Pregnancy Status Records 
FoundNo   
Pregnancy Status Records Found  
  
  
  
  
                                                    INFORMATION SOURCE (unrecogn  
ized section and content)  
   
                                          
  
  
  
                                          
   
                                          
  
  
  
                                DATE CREATED    AUTHOR          AUTHOR'S ORGANIZ  
ATION  
   
                                2018                      [facility]  
  
  
  
                                DATE CREATED    AUTHOR          AUTHOR'S ORGANIZ  
ATION  
   
                                2018                      [facility]  
  
  
  
                                DATE CREATED    AUTHOR          AUTHOR'S ORGANIZ  
ATION  
   
                                2023                      [facility]  
[facility]  
  
  
  
                                DATE CREATED    AUTHOR          AUTHOR'S ORGANIZ  
ATION  
   
                                2023                      [facility]  
nter  
  
  
  
                                DATE CREATED    AUTHOR          AUTHOR'S ORGANNGUYEN  
ATION  
   
                                2024                      [facility]  
  
  
  
  
  
                                                    Source Comments (unrecognize  
d section and content)  
   
                                                    In the event this informatio  
n is protected by the Federal Confidentiality of   
Alcohol   
and Drug Abuse Patient Records regulations: This information has been disclosed 
to   
you from records protected by Federal confidentiality rules (42 CFR Part 2). The
   
Federal rules prohibit you from making any further disclosure of this 
information   
unless further disclosure is expressly permitted by the written consent of the 
person   
to whom it pertains or as otherwise permitted by 42 CFR Part 2. A general   
authorization for the release of medical or other information is NOT sufficient 
for   
this purpose. The Federal rules restrict any use of the information to 
criminally   
investigate or prosecute any alcohol or drug abuse patient.[facility]In 
the   
event this information is protected by the Federal Confidentiality of Alcohol 
and   
Drug Abuse Patient Records regulations: This information has been disclosed to 
you   
from records protected by Federal confidentiality rules (42 CFR Part 2). The 
Federal   
rules prohibit you from making any further disclosure of this information unless
   
further disclosure is expressly permitted by the written consent of the person 
to   
whom it pertains or as otherwise permitted by 42 CFR Part 2. A general 
authorization   
for the release of medical or other information is NOT sufficient for this 
purpose.   
The Federal rules restrict any use of the information to criminally investigate 
or   
prosecute any alcohol or drug abuse patient.[facility] HospitalIn the event this   
information is protected by the Federal Confidentiality of Alcohol and Drug 
Abuse   
Patient Records regulations: This information has been disclosed to you from 
records   
protected by Federal confidentiality rules (42 CFR Part 2). The Federal rules   
prohibit you from making any further disclosure of this information unless 
further   
disclosure is expressly permitted by the written consent of the person to whom 
it   
pertains or as otherwise permitted by 42 CFR Part 2. A general authorization for
 the   
release of medical or other information is NOT sufficient for this purpose. The   
Federal rules restrict any use of the information to criminally investigate or   
prosecute any alcohol or drug abuse patient.[facility] HospitalIn the event this   
information is protected by the Federal Confidentiality of Alcohol and Drug 
Abuse   
Patient Records regulations: This information has been disclosed to you from 
records   
protected by Federal confidentiality rules (42 CFR Part 2). The Federal rules   
prohibit you from making any further disclosure of this information unless 
further   
disclosure is expressly permitted by the written consent of the person to whom 
it   
pertains or as otherwise permitted by 42 CFR Part 2. A general authorization for
 the   
release of medical or other information is NOT sufficient for this purpose. The   
Federal rules restrict any use of the information to criminally investigate or   
prosecute any alcohol or drug abuse patient.[facility] HospitalIn the event this   
information is protected by the Federal Confidentiality of Alcohol and Drug 
Abuse   
Patient Records regulations: This information has been disclosed to you from 
records   
protected by Federal confidentiality rules (42 CFR Part 2). The Federal rules   
prohibit you from making any further disclosure of this information unless 
further   
disclosure is expressly permitted by the written consent of the person to whom 
it   
pertains or as otherwise permitted by 42 CFR Part 2. A general authorization for
 the   
release of medical or other information is NOT sufficient for this purpose. The   
Federal rules restrict any use of the information to criminally investigate or   
prosecute any alcohol or drug abuse patient.[facility] HospitalIn the event this   
information is protected by the Federal Confidentiality of Alcohol and Drug 
Abuse   
Patient Records regulations: This information has been disclosed to you from 
records   
protected by Federal confidentiality rules (42 CFR Part 2). The Federal rules   
prohibit you from making any further disclosure of this information unless 
further   
disclosure is expressly permitted by the written consent of the person to whom 
it   
pertains or as otherwise permitted by 42 CFR Part 2. A general authorization for
 the   
release of medical or other information is NOT sufficient for this purpose. The   
Federal rules restrict any use of the information to criminally investigate or   
prosecute any alcohol or drug abuse patient.[facility] HospitalIn the event this   
information is protected by the Federal Confidentiality of Alcohol and Drug 
Abuse   
Patient Records regulations: This information has been disclosed to you from 
records   
protected by Federal confidentiality rules (42 CFR Part 2). The Federal rules   
prohibit you from making any further disclosure of this information unless 
further   
disclosure is expressly permitted by the written consent of the person to whom 
it   
pertains or as otherwise permitted by 42 CFR Part 2. A general authorization for
 the   
release of medical or other information is NOT sufficient for this purpose. The   
Federal rules restrict any use of the information to criminally investigate or   
prosecute any alcohol or drug abuse patient.[facility] HospitalIn the event this   
information is protected by the Federal Confidentiality of Alcohol and Drug 
Abuse   
Patient Records regulations: This information has been disclosed to you from 
records   
protected by Federal confidentiality rules (42 CFR Part 2). The Federal rules   
prohibit you from making any further disclosure of this information unless 
further   
disclosure is expressly permitted by the written consent of the person to whom 
it   
pertains or as otherwise permitted by 42 CFR Part 2. A general authorization for
 the   
release of medical or other information is NOT sufficient for this purpose. The   
Federal rules restrict any use of the information to criminally investigate or   
prosecute any alcohol or drug abuse patient.[facility] HospitalIn the event this   
information is protected by the Federal Confidentiality of Alcohol and Drug 
Abuse   
Patient Records regulations: This information has been disclosed to you from 
records   
protected by Federal confidentiality rules (42 CFR Part 2). The Federal rules   
prohibit you from making any further disclosure of this information unless 
further   
disclosure is expressly permitted by the written consent of the person to whom 
it   
pertains or as otherwise permitted by 42 CFR Part 2. A general authorization for
 the   
release of medical or other information is NOT sufficient for this purpose. The   
Federal rules restrict any use of the information to criminally investigate or   
prosecute any alcohol or drug abuse patient.[facility] HospitalIn the event this   
information is protected by the Federal Confidentiality of Alcohol and Drug 
Abuse   
Patient Records regulations: This information has been disclosed to you from 
records   
protected by Federal confidentiality rules (42 CFR Part 2). The Federal rules   
prohibit you from making any further disclosure of this information unless 
further   
disclosure is expressly permitted by the written consent of the person to whom 
it   
pertains or as otherwise permitted by 42 CFR Part 2. A general authorization for
 the   
release of medical or other information is NOT sufficient for this purpose. The   
Federal rules restrict any use of the information to criminally investigate or   
prosecute any alcohol or drug abuse patient.[facility] HospitalIn the event this   
information is protected by the Federal Confidentiality of Alcohol and Drug 
Abuse   
Patient Records regulations: This information has been disclosed to you from 
records   
protected by Federal confidentiality rules (42 CFR Part 2). The Federal rules   
prohibit you from making any further disclosure of this information unless 
further   
disclosure is expressly permitted by the written consent of the person to whom 
it   
pertains or as otherwise permitted by 42 CFR Part 2. A general authorization for
 the   
release of medical or other information is NOT sufficient for this purpose. The   
Federal rules restrict any use of the information to criminally investigate or   
prosecute any alcohol or drug abuse patient.[facility] HospitalIn the event this   
information is protected by the Federal Confidentiality of Alcohol and Drug 
Abuse   
Patient Records regulations: This information has been disclosed to you from 
records   
protected by Federal confidentiality rules (42 CFR Part 2). The Federal rules   
prohibit you from making any further disclosure of this information unless 
further   
disclosure is expressly permitted by the written consent of the person to whom 
it   
pertains or as otherwise permitted by 42 CFR Part 2. A general authorization for
 the   
release of medical or other information is NOT sufficient for this purpose. The   
Federal rules restrict any use of the information to criminally investigate or   
prosecute any alcohol or drug abuse patient.[facility] HospitalIn the event this   
information is protected by the Federal Confidentiality of Alcohol and Drug 
Abuse   
Patient Records regulations: This information has been disclosed to you from 
records   
protected by Federal confidentiality rules (42 CFR Part 2). The Federal rules   
prohibit you from making any further disclosure of this information unless 
further   
disclosure is expressly permitted by the written consent of the person to whom 
it   
pertains or as otherwise permitted by 42 CFR Part 2. A general authorization for
 the   
release of medical or other information is NOT sufficient for this purpose. The   
Federal rules restrict any use of the information to criminally investigate or   
prosecute any alcohol or drug abuse patient.[facility] HospitalIn the event this   
information is protected by the Federal Confidentiality of Alcohol and Drug 
Abuse   
Patient Records regulations: This information has been disclosed to you from 
records   
protected by Federal confidentiality rules (42 CFR Part 2). The Federal rules   
prohibit you from making any further disclosure of this information unless 
further   
disclosure is expressly permitted by the written consent of the person to whom 
it   
pertains or as otherwise permitted by 42 CFR Part 2. A general authorization for
 the   
release of medical or other information is NOT sufficient for this purpose. The   
Federal rules restrict any use of the information to criminally investigate or   
prosecute any alcohol or drug abuse patient.[facility] HospitalIn the event this   
information is protected by the Federal Confidentiality of Alcohol and Drug 
Abuse   
Patient Records regulations: This information has been disclosed to you from 
records   
protected by Federal confidentiality rules (42 CFR Part 2). The Federal rules   
prohibit you from making any further disclosure of this information unless 
further   
disclosure is expressly permitted by the written consent of the person to whom 
it   
pertains or as otherwise permitted by 42 CFR Part 2. A general authorization for
 the   
release of medical or other information is NOT sufficient for this purpose. The   
Federal rules restrict any use of the information to criminally investigate or   
prosecute any alcohol or drug abuse patient.[facility] HospitalIn the event this   
information is protected by the Federal Confidentiality of Alcohol and Drug 
Abuse   
Patient Records regulations: This information has been disclosed to you from 
records   
protected by Federal confidentiality rules (42 CFR Part 2). The Federal rules   
prohibit you from making any further disclosure of this information unless 
further   
disclosure is expressly permitted by the written consent of the person to whom 
it   
pertains or as otherwise permitted by 42 CFR Part 2. A general authorization for
 the   
release of medical or other information is NOT sufficient for this purpose. The   
Federal rules restrict any use of the information to criminally investigate or   
prosecute any alcohol or drug abuse patient.[facility] HospitalIn the event this   
information is protected by the Federal Confidentiality of Alcohol and Drug 
Abuse   
Patient Records regulations: This information has been disclosed to you from 
records   
protected by Federal confidentiality rules (42 CFR Part 2). The Federal rules   
prohibit you from making any further disclosure of this information unless 
further   
disclosure is expressly permitted by the written consent of the person to whom 
it   
pertains or as otherwise permitted by 42 CFR Part 2. A general authorization for
 the   
release of medical or other information is NOT sufficient for this purpose. The   
Federal rules restrict any use of the information to criminally investigate or   
prosecute any alcohol or drug abuse patient.[facility] HospitalIn the event this   
information is protected by the Federal Confidentiality of Alcohol and Drug 
Abuse   
Patient Records regulations: This information has been disclosed to you from 
records   
protected by Federal confidentiality rules (42 CFR Part 2). The Federal rules   
prohibit you from making any further disclosure of this information unless 
further   
disclosure is expressly permitted by the written consent of the person to whom 
it   
pertains or as otherwise permitted by 42 CFR Part 2. A general authorization for
 the   
release of medical or other information is NOT sufficient for this purpose. The   
Federal rules restrict any use of the information to criminally investigate or   
prosecute any alcohol or drug abuse patient.[facility] HospitalIn the event this   
information is protected by the Federal Confidentiality of Alcohol and Drug 
Abuse   
Patient Records regulations: This information has been disclosed to you from 
records   
protected by Federal confidentiality rules (42 CFR Part 2). The Federal rules   
prohibit you from making any further disclosure of this information unless 
further   
disclosure is expressly permitted by the written consent of the person to whom 
it   
pertains or as otherwise permitted by 42 CFR Part 2. A general authorization for
 the   
release of medical or other information is NOT sufficient for this purpose. The   
Federal rules restrict any use of the information to criminally investigate or   
prosecute any alcohol or drug abuse patient.[facility] HospitalIn the event this   
information is protected by the Federal Confidentiality of Alcohol and Drug 
Abuse   
Patient Records regulations: This information has been disclosed to you from 
records   
protected by Federal confidentiality rules (42 CFR Part 2). The Federal rules   
prohibit you from making any further disclosure of this information unless 
further   
disclosure is expressly permitted by the written consent of the person to whom 
it   
pertains or as otherwise permitted by 42 CFR Part 2. A general authorization for
 the   
release of medical or other information is NOT sufficient for this purpose. The   
Federal rules restrict any use of the information to criminally investigate or   
prosecute any alcohol or drug abuse patient.[facility] HospitalIn the event this   
information is protected by the Federal Confidentiality of Alcohol and Drug 
Abuse   
Patient Records regulations: This information has been disclosed to you from 
records   
protected by Federal confidentiality rules (42 CFR Part 2). The Federal rules   
prohibit you from making any further disclosure of this information unless 
further   
disclosure is expressly permitted by the written consent of the person to whom 
it   
pertains or as otherwise permitted by 42 CFR Part 2. A general authorization for
 the   
release of medical or other information is NOT sufficient for this purpose. The   
Federal rules restrict any use of the information to criminally investigate or   
prosecute any alcohol or drug abuse patient.[facility] HospitalIn the event this   
information is protected by the Federal Confidentiality of Alcohol and Drug 
Abuse   
Patient Records regulations: This information has been disclosed to you from 
records   
protected by Federal confidentiality rules (42 CFR Part 2). The Federal rules   
prohibit you from making any further disclosure of this information unless 
further   
disclosure is expressly permitted by the written consent of the person to whom 
it   
pertains or as otherwise permitted by 42 CFR Part 2. A general authorization for
 the   
release of medical or other information is NOT sufficient for this purpose. The   
Federal rules restrict any use of the information to criminally investigate or   
prosecute any alcohol or drug abuse patient.[facility] HospitalIn the event this   
information is protected by the Federal Confidentiality of Alcohol and Drug 
Abuse   
Patient Records regulations: This information has been disclosed to you from 
records   
protected by Federal confidentiality rules (42 CFR Part 2). The Federal rules   
prohibit you from making any further disclosure of this information unless 
further   
disclosure is expressly permitted by the written consent of the person to whom 
it   
pertains or as otherwise permitted by 42 CFR Part 2. A general authorization for
 the   
release of medical or other information is NOT sufficient for this purpose. The   
Federal rules restrict any use of the information to criminally investigate or   
prosecute any alcohol or drug abuse patient.[facility] HospitalIn the event this   
information is protected by the Federal Confidentiality of Alcohol and Drug 
Abuse   
Patient Records regulations: This information has been disclosed to you from 
records   
protected by Federal confidentiality rules (42 CFR Part 2). The Federal rules   
prohibit you from making any further disclosure of this information unless 
further   
disclosure is expressly permitted by the written consent of the person to whom 
it   
pertains or as otherwise permitted by 42 CFR Part 2. A general authorization for
 the   
release of medical or other information is NOT sufficient for this purpose. The   
Federal rules restrict any use of the information to criminally investigate or   
prosecute any alcohol or drug abuse patient.[facility] HospitalIn the event this   
information is protected by the Federal Confidentiality of Alcohol and Drug 
Abuse   
Patient Records regulations: This information has been disclosed to you from 
records   
protected by Federal confidentiality rules (42 CFR Part 2). The Federal rules   
prohibit you from making any further disclosure of this information unless 
further   
disclosure is expressly permitted by the written consent of the person to whom 
it   
pertains or as otherwise permitted by 42 CFR Part 2. A general authorization for
 the   
release of medical or other information is NOT sufficient for this purpose. The   
Federal rules restrict any use of the information to criminally investigate or   
prosecute any alcohol or drug abuse patient.[facility] HospitalIn the event this   
information is protected by the Federal Confidentiality of Alcohol and Drug 
Abuse   
Patient Records regulations: This information has been disclosed to you from 
records   
protected by Federal confidentiality rules (42 CFR Part 2). The Federal rules   
prohibit you from making any further disclosure of this information unless 
further   
disclosure is expressly permitted by the written consent of the person to whom 
it   
pertains or as otherwise permitted by 42 CFR Part 2. A general authorization for
 the   
release of medical or other information is NOT sufficient for this purpose. The   
Federal rules restrict any use of the information to criminally investigate or   
prosecute any alcohol or drug abuse patient.[facility] HospitalIn the event this   
information is protected by the Federal Confidentiality of Alcohol and Drug 
Abuse   
Patient Records regulations: This information has been disclosed to you from 
records   
protected by Federal confidentiality rules (42 CFR Part 2). The Federal rules   
prohibit you from making any further disclosure of this information unless 
further   
disclosure is expressly permitted by the written consent of the person to whom 
it   
pertains or as otherwise permitted by 42 CFR Part 2. A general authorization for
 the   
release of medical or other information is NOT sufficient for this purpose. The   
Federal rules restrict any use of the information to criminally investigate or   
prosecute any alcohol or drug abuse patient.[facility]In the event this   
information is protected by the Federal Confidentiality of Alcohol and Drug 
Abuse   
Patient Records regulations: This information has been disclosed to you from 
records   
protected by Federal confidentiality rules (42 CFR Part 2). The Federal rules   
prohibit you from making any further disclosure of this information unless 
further   
disclosure is expressly permitted by the written consent of the person to whom 
it   
pertains or as otherwise permitted by 42 CFR Part 2. A general authorization for
 the   
release of medical or other information is NOT sufficient for this purpose. The   
Federal rules restrict any use of the information to criminally investigate or   
prosecute any alcohol or drug abuse patient.[facility]In the event this   
information is protected by the Federal Confidentiality of Alcohol and Drug 
Abuse   
Patient Records regulations: This information has been disclosed to you from 
records   
protected by Federal confidentiality rules (42 CFR Part 2). The Federal rules   
prohibit you from making any further disclosure of this information unless 
further   
disclosure is expressly permitted by the written consent of the person to whom 
it   
pertains or as otherwise permitted by 42 CFR Part 2. A general authorization for
 the   
release of medical or other information is NOT sufficient for this purpose. The   
Federal rules restrict any use of the information to criminally investigate or   
prosecute any alcohol or drug abuse patient.[facility] HospitalIn the event this   
information is protected by the Federal Confidentiality of Alcohol and Drug 
Abuse   
Patient Records regulations: This information has been disclosed to you from 
records   
protected by Federal confidentiality rules (42 CFR Part 2). The Federal rules   
prohibit you from making any further disclosure of this information unless 
further   
disclosure is expressly permitted by the written consent of the person to whom 
it   
pertains or as otherwise permitted by 42 CFR Part 2. A general authorization for
 the   
release of medical or other information is NOT sufficient for this purpose. The   
Federal rules restrict any use of the information to criminally investigate or   
prosecute any alcohol or drug abuse patient.[facility] HospitalIn the event this   
information is protected by the Federal Confidentiality of Alcohol and Drug 
Abuse   
Patient Records regulations: This information has been disclosed to you from 
records   
protected by Federal confidentiality rules (42 CFR Part 2). The Federal rules   
prohibit you from making any further disclosure of this information unless 
further   
disclosure is expressly permitted by the written consent of the person to whom 
it   
pertains or as otherwise permitted by 42 CFR Part 2. A general authorization for
 the   
release of medical or other information is NOT sufficient for this purpose. The   
Federal rules restrict any use of the information to criminally investigate or   
prosecute any alcohol or drug abuse patient.[facility] HospitalIn the event this   
information is protected by the Federal Confidentiality of Alcohol and Drug 
Abuse   
Patient Records regulations: This information has been disclosed to you from 
records   
protected by Federal confidentiality rules (42 CFR Part 2). The Federal rules   
prohibit you from making any further disclosure of this information unless 
further   
disclosure is expressly permitted by the written consent of the person to whom 
it   
pertains or as otherwise permitted by 42 CFR Part 2. A general authorization for
 the   
release of medical or other information is NOT sufficient for this purpose. The   
Federal rules restrict any use of the information to criminally investigate or   
prosecute any alcohol or drug abuse patient.[facility] HospitalIn the event this   
information is protected by the Federal Confidentiality of Alcohol and Drug 
Abuse   
Patient Records regulations: This information has been disclosed to you from 
records   
protected by Federal confidentiality rules (42 CFR Part 2). The Federal rules   
prohibit you from making any further disclosure of this information unless 
further   
disclosure is expressly permitted by the written consent of the person to whom 
it   
pertains or as otherwise permitted by 42 CFR Part 2. A general authorization for
 the   
release of medical or other information is NOT sufficient for this purpose. The   
Federal rules restrict any use of the information to criminally investigate or   
prosecute any alcohol or drug abuse patient.[facility] HospitalIn the event this   
information is protected by the Federal Confidentiality of Alcohol and Drug 
Abuse   
Patient Records regulations: This information has been disclosed to you from 
records   
protected by Federal confidentiality rules (42 CFR Part 2). The Federal rules   
prohibit you from making any further disclosure of this information unless 
further   
disclosure is expressly permitted by the written consent of the person to whom 
it   
pertains or as otherwise permitted by 42 CFR Part 2. A general authorization for
 the   
release of medical or other information is NOT sufficient for this purpose. The   
Federal rules restrict any use of the information to criminally investigate or   
prosecute any alcohol or drug abuse patient.[facility] HospitalIn the event this   
information is protected by the Federal Confidentiality of Alcohol and Drug 
Abuse   
Patient Records regulations: This information has been disclosed to you from 
records   
protected by Federal confidentiality rules (42 CFR Part 2). The Federal rules   
prohibit you from making any further disclosure of this information unless 
further   
disclosure is expressly permitted by the written consent of the person to whom 
it   
pertains or as otherwise permitted by 42 CFR Part 2. A general authorization for
 the   
release of medical or other information is NOT sufficient for this purpose. The   
Federal rules restrict any use of the information to criminally investigate or   
prosecute any alcohol or drug abuse patient.[facility] HospitalIn the event this   
information is protected by the Federal Confidentiality of Alcohol and Drug 
Abuse   
Patient Records regulations: This information has been disclosed to you from 
records   
protected by Federal confidentiality rules (42 CFR Part 2). The Federal rules   
prohibit you from making any further disclosure of this information unless 
further   
disclosure is expressly permitted by the written consent of the person to whom 
it   
pertains or as otherwise permitted by 42 CFR Part 2. A general authorization for
 the   
release of medical or other information is NOT sufficient for this purpose. The   
Federal rules restrict any use of the information to criminally investigate or   
prosecute any alcohol or drug abuse patient.[facility] HospitalIn the event this   
information is protected by the Federal Confidentiality of Alcohol and Drug 
Abuse   
Patient Records regulations: This information has been disclosed to you from 
records   
protected by Federal confidentiality rules (42 CFR Part 2). The Federal rules   
prohibit you from making any further disclosure of this information unless 
further   
disclosure is expressly permitted by the written consent of the person to whom 
it   
pertains or as otherwise permitted by 42 CFR Part 2. A general authorization for
 the   
release of medical or other information is NOT sufficient for this purpose. The   
Federal rules restrict any use of the information to criminally investigate or   
prosecute any alcohol or drug abuse patient.[facility] HospitalIn the event this   
information is protected by the Federal Confidentiality of Alcohol and Drug 
Abuse   
Patient Records regulations: This information has been disclosed to you from 
records   
protected by Federal confidentiality rules (42 CFR Part 2). The Federal rules   
prohibit you from making any further disclosure of this information unless 
further   
disclosure is expressly permitted by the written consent of the person to whom 
it   
pertains or as otherwise permitted by 42 CFR Part 2. A general authorization for
 the   
release of medical or other information is NOT sufficient for this purpose. The   
Federal rules restrict any use of the information to criminally investigate or   
prosecute any alcohol or drug abuse patient.[facility] HospitalIn the event this   
information is protected by the Federal Confidentiality of Alcohol and Drug 
Abuse   
Patient Records regulations: This information has been disclosed to you from 
records   
protected by Federal confidentiality rules (42 CFR Part 2). The Federal rules   
prohibit you from making any further disclosure of this information unless 
further   
disclosure is expressly permitted by the written consent of the person to whom 
it   
pertains or as otherwise permitted by 42 CFR Part 2. A general authorization for
 the   
release of medical or other information is NOT sufficient for this purpose. The   
Federal rules restrict any use of the information to criminally investigate or   
prosecute any alcohol or drug abuse patient.[facility] HospitalIn the event this   
information is protected by the Federal Confidentiality of Alcohol and Drug 
Abuse   
Patient Records regulations: This information has been disclosed to you from 
records   
protected by Federal confidentiality rules (42 CFR Part 2). The Federal rules   
prohibit you from making any further disclosure of this information unless 
further   
disclosure is expressly permitted by the written consent of the person to whom 
it   
pertains or as otherwise permitted by 42 CFR Part 2. A general authorization for
 the   
release of medical or other information is NOT sufficient for this purpose. The   
Federal rules restrict any use of the information to criminally investigate or   
prosecute any alcohol or drug abuse patient.[facility] HospitalIn the event this   
information is protected by the Federal Confidentiality of Alcohol and Drug 
Abuse   
Patient Records regulations: This information has been disclosed to you from 
records   
protected by Federal confidentiality rules (42 CFR Part 2). The Federal rules   
prohibit you from making any further disclosure of this information unless 
further   
disclosure is expressly permitted by the written consent of the person to whom 
it   
pertains or as otherwise permitted by 42 CFR Part 2. A general authorization for
 the   
release of medical or other information is NOT sufficient for this purpose. The   
Federal rules restrict any use of the information to criminally investigate or   
prosecute any alcohol or drug abuse patient.[facility] HospitalIn the event this   
information is protected by the Federal Confidentiality of Alcohol and Drug 
Abuse   
Patient Records regulations: This information has been disclosed to you from 
records   
protected by Federal confidentiality rules (42 CFR Part 2). The Federal rules   
prohibit you from making any further disclosure of this information unless 
further   
disclosure is expressly permitted by the written consent of the person to whom 
it   
pertains or as otherwise permitted by 42 CFR Part 2. A general authorization for
 the   
release of medical or other information is NOT sufficient for this purpose. The   
Federal rules restrict any use of the information to criminally investigate or   
prosecute any alcohol or drug abuse patient.[facility] HospitalIn the event this   
information is protected by the Federal Confidentiality of Alcohol and Drug 
Abuse   
Patient Records regulations: This information has been disclosed to you from 
records   
protected by Federal confidentiality rules (42 CFR Part 2). The Federal rules   
prohibit you from making any further disclosure of this information unless 
further   
disclosure is expressly permitted by the written consent of the person to whom 
it   
pertains or as otherwise permitted by 42 CFR Part 2. A general authorization for
 the   
release of medical or other information is NOT sufficient for this purpose. The   
Federal rules restrict any use of the information to criminally investigate or   
prosecute any alcohol or drug abuse patient.[facility] HospitalIn the event this   
information is protected by the Federal Confidentiality of Alcohol and Drug 
Abuse   
Patient Records regulations: This information has been disclosed to you from 
records   
protected by Federal confidentiality rules (42 CFR Part 2). The Federal rules   
prohibit you from making any further disclosure of this information unless 
further   
disclosure is expressly permitted by the written consent of the person to whom 
it   
pertains or as otherwise permitted by 42 CFR Part 2. A general authorization for
 the   
release of medical or other information is NOT sufficient for this purpose. The   
Federal rules restrict any use of the information to criminally investigate or   
prosecute any alcohol or drug abuse patient.[facility] HospitalIn the event this   
information is protected by the Federal Confidentiality of Alcohol and Drug 
Abuse   
Patient Records regulations: This information has been disclosed to you from 
records   
protected by Federal confidentiality rules (42 CFR Part 2). The Federal rules   
prohibit you from making any further disclosure of this information unless 
further   
disclosure is expressly permitted by the written consent of the person to whom 
it   
pertains or as otherwise permitted by 42 CFR Part 2. A general authorization for
 the   
release of medical or other information is NOT sufficient for this purpose. The   
Federal rules restrict any use of the information to criminally investigate or   
prosecute any alcohol or drug abuse patient.[facility] HospitalIn the event this   
information is protected by the Federal Confidentiality of Alcohol and Drug 
Abuse   
Patient Records regulations: This information has been disclosed to you from 
records   
protected by Federal confidentiality rules (42 CFR Part 2). The Federal rules   
prohibit you from making any further disclosure of this information unless 
further   
disclosure is expressly permitted by the written consent of the person to whom 
it   
pertains or as otherwise permitted by 42 CFR Part 2. A general authorization for
 the   
release of medical or other information is NOT sufficient for this purpose. The   
Federal rules restrict any use of the information to criminally investigate or   
prosecute any alcohol or drug abuse patient.[facility] HospitalIn the event this   
information is protected by the Federal Confidentiality of Alcohol and Drug 
Abuse   
Patient Records regulations: This information has been disclosed to you from 
records   
protected by Federal confidentiality rules (42 CFR Part 2). The Federal rules   
prohibit you from making any further disclosure of this information unless 
further   
disclosure is expressly permitted by the written consent of the person to whom 
it   
pertains or as otherwise permitted by 42 CFR Part 2. A general authorization for
 the   
release of medical or other information is NOT sufficient for this purpose. The   
Federal rules restrict any use of the information to criminally investigate or   
prosecute any alcohol or drug abuse patient.[facility] HospitalIn the event this   
information is protected by the Federal Confidentiality of Alcohol and Drug 
Abuse   
Patient Records regulations: This information has been disclosed to you from 
records   
protected by Federal confidentiality rules (42 CFR Part 2). The Federal rules   
prohibit you from making any further disclosure of this information unless 
further   
disclosure is expressly permitted by the written consent of the person to whom 
it   
pertains or as otherwise permitted by 42 CFR Part 2. A general authorization for
 the   
release of medical or other information is NOT sufficient for this purpose. The   
Federal rules restrict any use of the information to criminally investigate or   
prosecute any alcohol or drug abuse patient.[facility] HospitalIn the event this   
information is protected by the Federal Confidentiality of Alcohol and Drug 
Abuse   
Patient Records regulations: This information has been disclosed to you from 
records   
protected by Federal confidentiality rules (42 CFR Part 2). The Federal rules   
prohibit you from making any further disclosure of this information unless 
further   
disclosure is expressly permitted by the written consent of the person to whom 
it   
pertains or as otherwise permitted by 42 CFR Part 2. A general authorization for
 the   
release of medical or other information is NOT sufficient for this purpose. The   
Federal rules restrict any use of the information to criminally investigate or   
prosecute any alcohol or drug abuse patient.[facility] HospitalIn the event this   
information is protected by the Federal Confidentiality of Alcohol and Drug 
Abuse   
Patient Records regulations: This information has been disclosed to you from 
records   
protected by Federal confidentiality rules (42 CFR Part 2). The Federal rules   
prohibit you from making any further disclosure of this information unless 
further   
disclosure is expressly permitted by the written consent of the person to whom 
it   
pertains or as otherwise permitted by 42 CFR Part 2. A general authorization for
 the   
release of medical or other information is NOT sufficient for this purpose. The   
Federal rules restrict any use of the information to criminally investigate or   
prosecute any alcohol or drug abuse patient.[facility] HospitalIn the event this   
information is protected by the Federal Confidentiality of Alcohol and Drug 
Abuse   
Patient Records regulations: This information has been disclosed to you from 
records   
protected by Federal confidentiality rules (42 CFR Part 2). The Federal rules   
prohibit you from making any further disclosure of this information unless 
further   
disclosure is expressly permitted by the written consent of the person to whom 
it   
pertains or as otherwise permitted by 42 CFR Part 2. A general authorization for
 the   
release of medical or other information is NOT sufficient for this purpose. The   
Federal rules restrict any use of the information to criminally investigate or   
prosecute any alcohol or drug abuse patient.[facility] HospitalIn the event this   
information is protected by the Federal Confidentiality of Alcohol and Drug 
Abuse   
Patient Records regulations: This information has been disclosed to you from 
records   
protected by Federal confidentiality rules (42 CFR Part 2). The Federal rules   
prohibit you from making any further disclosure of this information unless 
further   
disclosure is expressly permitted by the written consent of the person to whom 
it   
pertains or as otherwise permitted by 42 CFR Part 2. A general authorization for
 the   
release of medical or other information is NOT sufficient for this purpose. The   
Federal rules restrict any use of the information to criminally investigate or   
prosecute any alcohol or drug abuse patient.[facility] HospitalIn the event this   
information is protected by the Federal Confidentiality of Alcohol and Drug 
Abuse   
Patient Records regulations: This information has been disclosed to you from 
records   
protected by Federal confidentiality rules (42 CFR Part 2). The Federal rules   
prohibit you from making any further disclosure of this information unless 
further   
disclosure is expressly permitted by the written consent of the person to whom 
it   
pertains or as otherwise permitted by 42 CFR Part 2. A general authorization for
 the   
release of medical or other information is NOT sufficient for this purpose. The   
Federal rules restrict any use of the information to criminally investigate or   
prosecute any alcohol or drug abuse patient.[facility] HospitalIn the event this   
information is protected by the Federal Confidentiality of Alcohol and Drug 
Abuse   
Patient Records regulations: This information has been disclosed to you from 
records   
protected by Federal confidentiality rules (42 CFR Part 2). The Federal rules   
prohibit you from making any further disclosure of this information unless 
further   
disclosure is expressly permitted by the written consent of the person to whom 
it   
pertains or as otherwise permitted by 42 CFR Part 2. A general authorization for
 the   
release of medical or other information is NOT sufficient for this purpose. The   
Federal rules restrict any use of the information to criminally investigate or   
prosecute any alcohol or drug abuse patient.[facility] HospitalIn the event this   
information is protected by the Federal Confidentiality of Alcohol and Drug 
Abuse   
Patient Records regulations: This information has been disclosed to you from 
records   
protected by Federal confidentiality rules (42 CFR Part 2). The Federal rules   
prohibit you from making any further disclosure of this information unless 
further   
disclosure is expressly permitted by the written consent of the person to whom 
it   
pertains or as otherwise permitted by 42 CFR Part 2. A general authorization for
 the   
release of medical or other information is NOT sufficient for this purpose. The   
Federal rules restrict any use of the information to criminally investigate or   
prosecute any alcohol or drug abuse patient.[facility] HospitalIn the event this   
information is protected by the Federal Confidentiality of Alcohol and Drug 
Abuse   
Patient Records regulations: This information has been disclosed to you from 
records   
protected by Federal confidentiality rules (42 CFR Part 2). The Federal rules   
prohibit you from making any further disclosure of this information unless 
further   
disclosure is expressly permitted by the written consent of the person to whom 
it   
pertains or as otherwise permitted by 42 CFR Part 2. A general authorization for
 the   
release of medical or other information is NOT sufficient for this purpose. The   
Federal rules restrict any use of the information to criminally investigate or   
prosecute any alcohol or drug abuse patient.[facility] HospitalIn the event this   
information is protected by the Federal Confidentiality of Alcohol and Drug 
Abuse   
Patient Records regulations: This information has been disclosed to you from 
records   
protected by Federal confidentiality rules (42 CFR Part 2). The Federal rules   
prohibit you from making any further disclosure of this information unless 
further   
disclosure is expressly permitted by the written consent of the person to whom 
it   
pertains or as otherwise permitted by 42 CFR Part 2. A general authorization for
 the   
release of medical or other information is NOT sufficient for this purpose. The   
Federal rules restrict any use of the information to criminally investigate or   
prosecute any alcohol or drug abuse patient.[facility] HospitalIn the event this   
information is protected by the Federal Confidentiality of Alcohol and Drug 
Abuse   
Patient Records regulations: This information has been disclosed to you from 
records   
protected by Federal confidentiality rules (42 CFR Part 2). The Federal rules   
prohibit you from making any further disclosure of this information unless 
further   
disclosure is expressly permitted by the written consent of the person to whom 
it   
pertains or as otherwise permitted by 42 CFR Part 2. A general authorization for
 the   
release of medical or other information is NOT sufficient for this purpose. The   
Federal rules restrict any use of the information to criminally investigate or   
prosecute any alcohol or drug abuse patient.[facility]In the event this   
information is protected by the Federal Confidentiality of Alcohol and Drug 
Abuse   
Patient Records regulations: This information has been disclosed to you from 
records   
protected by Federal confidentiality rules (42 CFR Part 2). The Federal rules   
prohibit you from making any further disclosure of this information unless 
further   
disclosure is expressly permitted by the written consent of the person to whom 
it   
pertains or as otherwise permitted by 42 CFR Part 2. A general authorization for
 the   
release of medical or other information is NOT sufficient for this purpose. The   
Federal rules restrict any use of the information to criminally investigate or   
prosecute any alcohol or drug abuse patient.[facility] HospitalIn the event this   
information is protected by the Federal Confidentiality of Alcohol and Drug 
Abuse   
Patient Records regulations: This information has been disclosed to you from 
records   
protected by Federal confidentiality rules (42 CFR Part 2). The Federal rules   
prohibit you from making any further disclosure of this information unless 
further   
disclosure is expressly permitted by the written consent of the person to whom 
it   
pertains or as otherwise permitted by 42 CFR Part 2. A general authorization for
 the   
release of medical or other information is NOT sufficient for this purpose. The   
Federal rules restrict any use of the information to criminally investigate or   
prosecute any alcohol or drug abuse patient.[facility] HospitalIn the event this   
information is protected by the Federal Confidentiality of Alcohol and Drug 
Abuse   
Patient Records regulations: This information has been disclosed to you from 
records   
protected by Federal confidentiality rules (42 CFR Part 2). The Federal rules   
prohibit you from making any further disclosure of this information unless 
further   
disclosure is expressly permitted by the written consent of the person to whom 
it   
pertains or as otherwise permitted by 42 CFR Part 2. A general authorization for
 the   
release of medical or other information is NOT sufficient for this purpose. The   
Federal rules restrict any use of the information to criminally investigate or   
prosecute any alcohol or drug abuse patient.[facility] HospitalIn the event this   
information is protected by the Federal Confidentiality of Alcohol and Drug 
Abuse   
Patient Records regulations: This information has been disclosed to you from 
records   
protected by Federal confidentiality rules (42 CFR Part 2). The Federal rules   
prohibit you from making any further disclosure of this information unless 
further   
disclosure is expressly permitted by the written consent of the person to whom 
it   
pertains or as otherwise permitted by 42 CFR Part 2. A general authorization for
 the   
release of medical or other information is NOT sufficient for this purpose. The   
Federal rules restrict any use of the information to criminally investigate or   
prosecute any alcohol or drug abuse patient.[facility] HospitalIn the event this   
information is protected by the Federal Confidentiality of Alcohol and Drug 
Abuse   
Patient Records regulations: This information has been disclosed to you from 
records   
protected by Federal confidentiality rules (42 CFR Part 2). The Federal rules   
prohibit you from making any further disclosure of this information unless 
further   
disclosure is expressly permitted by the written consent of the person to whom 
it   
pertains or as otherwise permitted by 42 CFR Part 2. A general authorization for
 the   
release of medical or other information is NOT sufficient for this purpose. The   
Federal rules restrict any use of the information to criminally investigate or   
prosecute any alcohol or drug abuse patient.[facility] HospitalIn the event this   
information is protected by the Federal Confidentiality of Alcohol and Drug 
Abuse   
Patient Records regulations: This information has been disclosed to you from 
records   
protected by Federal confidentiality rules (42 CFR Part 2). The Federal rules   
prohibit you from making any further disclosure of this information unless 
further   
disclosure is expressly permitted by the written consent of the person to whom 
it   
pertains or as otherwise permitted by 42 CFR Part 2. A general authorization for
 the   
release of medical or other information is NOT sufficient for this purpose. The   
Federal rules restrict any use of the information to criminally investigate or   
prosecute any alcohol or drug abuse patient.[facility] HospitalIn the event this   
information is protected by the Federal Confidentiality of Alcohol and Drug 
Abuse   
Patient Records regulations: This information has been disclosed to you from 
records   
protected by Federal confidentiality rules (42 CFR Part 2). The Federal rules   
prohibit you from making any further disclosure of this information unless 
further   
disclosure is expressly permitted by the written consent of the person to whom 
it   
pertains or as otherwise permitted by 42 CFR Part 2. A general authorization for
 the   
release of medical or other information is NOT sufficient for this purpose. The   
Federal rules restrict any use of the information to criminally investigate or   
prosecute any alcohol or drug abuse patient.[facility] HospitalIn the event this   
information is protected by the Federal Confidentiality of Alcohol and Drug 
Abuse   
Patient Records regulations: This information has been disclosed to you from 
records   
protected by Federal confidentiality rules (42 CFR Part 2). The Federal rules   
prohibit you from making any further disclosure of this information unless 
further   
disclosure is expressly permitted by the written consent of the person to whom 
it   
pertains or as otherwise permitted by 42 CFR Part 2. A general authorization for
 the   
release of medical or other information is NOT sufficient for this purpose. The   
Federal rules restrict any use of the information to criminally investigate or   
prosecute any alcohol or drug abuse patient.[facility] HospitalIn the event this   
information is protected by the Federal Confidentiality of Alcohol and Drug 
Abuse   
Patient Records regulations: This information has been disclosed to you from 
records   
protected by Federal confidentiality rules (42 CFR Part 2). The Federal rules   
prohibit you from making any further disclosure of this information unless 
further   
disclosure is expressly permitted by the written consent of the person to whom 
it   
pertains or as otherwise permitted by 42 CFR Part 2. A general authorization for
 the   
release of medical or other information is NOT sufficient for this purpose. The   
Federal rules restrict any use of the information to criminally investigate or   
prosecute any alcohol or drug abuse patient.[facility] HospitalIn the event this   
information is protected by the Federal Confidentiality of Alcohol and Drug 
Abuse   
Patient Records regulations: This information has been disclosed to you from 
records   
protected by Federal confidentiality rules (42 CFR Part 2). The Federal rules   
prohibit you from making any further disclosure of this information unless 
further   
disclosure is expressly permitted by the written consent of the person to whom 
it   
pertains or as otherwise permitted by 42 CFR Part 2. A general authorization for
 the   
release of medical or other information is NOT sufficient for this purpose. The   
Federal rules restrict any use of the information to criminally investigate or   
prosecute any alcohol or drug abuse patient.[facility] HospitalIn the event this   
information is protected by the Federal Confidentiality of Alcohol and Drug 
Abuse   
Patient Records regulations: This information has been disclosed to you from 
records   
protected by Federal confidentiality rules (42 CFR Part 2). The Federal rules   
prohibit you from making any further disclosure of this information unless 
further   
disclosure is expressly permitted by the written consent of the person to whom 
it   
pertains or as otherwise permitted by 42 CFR Part 2. A general authorization for
 the   
release of medical or other information is NOT sufficient for this purpose. The   
Federal rules restrict any use of the information to criminally investigate or   
prosecute any alcohol or drug abuse patient.[facility] HospitalIn the event this   
information is protected by the Federal Confidentiality of Alcohol and Drug 
Abuse   
Patient Records regulations: This information has been disclosed to you from 
records   
protected by Federal confidentiality rules (42 CFR Part 2). The Federal rules   
prohibit you from making any further disclosure of this information unless 
further   
disclosure is expressly permitted by the written consent of the person to whom 
it   
pertains or as otherwise permitted by 42 CFR Part 2. A general authorization for
 the   
release of medical or other information is NOT sufficient for this purpose. The   
Federal rules restrict any use of the information to criminally investigate or   
prosecute any alcohol or drug abuse patient.[facility] HospitalIn the event this   
information is protected by the Federal Confidentiality of Alcohol and Drug 
Abuse   
Patient Records regulations: This information has been disclosed to you from 
records   
protected by Federal confidentiality rules (42 CFR Part 2). The Federal rules   
prohibit you from making any further disclosure of this information unless 
further   
disclosure is expressly permitted by the written consent of the person to whom 
it   
pertains or as otherwise permitted by 42 CFR Part 2. A general authorization for
 the   
release of medical or other information is NOT sufficient for this purpose. The   
Federal rules restrict any use of the information to criminally investigate or   
prosecute any alcohol or drug abuse patient.[facility] HospitalIn the event this   
information is protected by the Federal Confidentiality of Alcohol and Drug 
Abuse   
Patient Records regulations: This information has been disclosed to you from 
records   
protected by Federal confidentiality rules (42 CFR Part 2). The Federal rules   
prohibit you from making any further disclosure of this information unless 
further   
disclosure is expressly permitted by the written consent of the person to whom 
it   
pertains or as otherwise permitted by 42 CFR Part 2. A general authorization for
 the   
release of medical or other information is NOT sufficient for this purpose. The   
Federal rules restrict any use of the information to criminally investigate or   
prosecute any alcohol or drug abuse patient.[facility] HospitalIn the event this   
information is protected by the Federal Confidentiality of Alcohol and Drug 
Abuse   
Patient Records regulations: This information has been disclosed to you from 
records   
protected by Federal confidentiality rules (42 CFR Part 2). The Federal rules   
prohibit you from making any further disclosure of this information unless 
further   
disclosure is expressly permitted by the written consent of the person to whom 
it   
pertains or as otherwise permitted by 42 CFR Part 2. A general authorization for
 the   
release of medical or other information is NOT sufficient for this purpose. The   
Federal rules restrict any use of the information to criminally investigate or   
prosecute any alcohol or drug abuse patient.[facility] HospitalIn the event this   
information is protected by the Federal Confidentiality of Alcohol and Drug 
Abuse   
Patient Records regulations: This information has been disclosed to you from 
records   
protected by Federal confidentiality rules (42 CFR Part 2). The Federal rules   
prohibit you from making any further disclosure of this information unless 
further   
disclosure is expressly permitted by the written consent of the person to whom 
it   
pertains or as otherwise permitted by 42 CFR Part 2. A general authorization for
 the   
release of medical or other information is NOT sufficient for this purpose. The   
Federal rules restrict any use of the information to criminally investigate or   
prosecute any alcohol or drug abuse patient.[facility] HospitalIn the event this   
information is protected by the Federal Confidentiality of Alcohol and Drug 
Abuse   
Patient Records regulations: This information has been disclosed to you from 
records   
protected by Federal confidentiality rules (42 CFR Part 2). The Federal rules   
prohibit you from making any further disclosure of this information unless 
further   
disclosure is expressly permitted by the written consent of the person to whom 
it   
pertains or as otherwise permitted by 42 CFR Part 2. A general authorization for
 the   
release of medical or other information is NOT sufficient for this purpose. The   
Federal rules restrict any use of the information to criminally investigate or   
prosecute any alcohol or drug abuse patient.[facility] HospitalIn the event this   
information is protected by the Federal Confidentiality of Alcohol and Drug 
Abuse   
Patient Records regulations: This information has been disclosed to you from 
records   
protected by Federal confidentiality rules (42 CFR Part 2). The Federal rules   
prohibit you from making any further disclosure of this information unless 
further   
disclosure is expressly permitted by the written consent of the person to whom 
it   
pertains or as otherwise permitted by 42 CFR Part 2. A general authorization for
 the   
release of medical or other information is NOT sufficient for this purpose. The   
Federal rules restrict any use of the information to criminally investigate or   
prosecute any alcohol or drug abuse patient.[facility] HospitalIn the event this   
information is protected by the Federal Confidentiality of Alcohol and Drug 
Abuse   
Patient Records regulations: This information has been disclosed to you from 
records   
protected by Federal confidentiality rules (42 CFR Part 2). The Federal rules   
prohibit you from making any further disclosure of this information unless 
further   
disclosure is expressly permitted by the written consent of the person to whom 
it   
pertains or as otherwise permitted by 42 CFR Part 2. A general authorization for
 the   
release of medical or other information is NOT sufficient for this purpose. The   
Federal rules restrict any use of the information to criminally investigate or   
prosecute any alcohol or drug abuse patient.[facility] HospitalIn the event this   
information is protected by the Federal Confidentiality of Alcohol and Drug 
Abuse   
Patient Records regulations: This information has been disclosed to you from 
records   
protected by Federal confidentiality rules (42 CFR Part 2). The Federal rules   
prohibit you from making any further disclosure of this information unless 
further   
disclosure is expressly permitted by the written consent of the person to whom 
it   
pertains or as otherwise permitted by 42 CFR Part 2. A general authorization for
 the   
release of medical or other information is NOT sufficient for this purpose. The   
Federal rules restrict any use of the information to criminally investigate or   
prosecute any alcohol or drug abuse patient.[facility] HospitalIn the event this   
information is protected by the Federal Confidentiality of Alcohol and Drug 
Abuse   
Patient Records regulations: This information has been disclosed to you from 
records   
protected by Federal confidentiality rules (42 CFR Part 2). The Federal rules   
prohibit you from making any further disclosure of this information unless 
further   
disclosure is expressly permitted by the written consent of the person to whom 
it   
pertains or as otherwise permitted by 42 CFR Part 2. A general authorization for
 the   
release of medical or other information is NOT sufficient for this purpose. The   
Federal rules restrict any use of the information to criminally investigate or   
prosecute any alcohol or drug abuse patient.[facility] HospitalIn the event this   
information is protected by the Federal Confidentiality of Alcohol and Drug 
Abuse   
Patient Records regulations: This information has been disclosed to you from 
records   
protected by Federal confidentiality rules (42 CFR Part 2). The Federal rules   
prohibit you from making any further disclosure of this information unless 
further   
disclosure is expressly permitted by the written consent of the person to whom 
it   
pertains or as otherwise permitted by 42 CFR Part 2. A general authorization for
 the   
release of medical or other information is NOT sufficient for this purpose. The   
Federal rules restrict any use of the information to criminally investigate or   
prosecute any alcohol or drug abuse patient.[facility] HospitalIn the event this   
information is protected by the Federal Confidentiality of Alcohol and Drug 
Abuse   
Patient Records regulations: This information has been disclosed to you from 
records   
protected by Federal confidentiality rules (42 CFR Part 2). The Federal rules   
prohibit you from making any further disclosure of this information unless 
further   
disclosure is expressly permitted by the written consent of the person to whom 
it   
pertains or as otherwise permitted by 42 CFR Part 2. A general authorization for
 the   
release of medical or other information is NOT sufficient for this purpose. The   
Federal rules restrict any use of the information to criminally investigate or   
prosecute any alcohol or drug abuse patient.[facility] HospitalIn the event this   
information is protected by the Federal Confidentiality of Alcohol and Drug 
Abuse   
Patient Records regulations: This information has been disclosed to you from 
records   
protected by Federal confidentiality rules (42 CFR Part 2). The Federal rules   
prohibit you from making any further disclosure of this information unless 
further   
disclosure is expressly permitted by the written consent of the person to whom 
it   
pertains or as otherwise permitted by 42 CFR Part 2. A general authorization for
 the   
release of medical or other information is NOT sufficient for this purpose. The   
Federal rules restrict any use of the information to criminally investigate or   
prosecute any alcohol or drug abuse patient.[facility] HospitalIn the event this   
information is protected by the Federal Confidentiality of Alcohol and Drug 
Abuse   
Patient Records regulations: This information has been disclosed to you from 
records   
protected by Federal confidentiality rules (42 CFR Part 2). The Federal rules   
prohibit you from making any further disclosure of this information unless 
further   
disclosure is expressly permitted by the written consent of the person to whom 
it   
pertains or as otherwise permitted by 42 CFR Part 2. A general authorization for
 the   
release of medical or other information is NOT sufficient for this purpose. The   
Federal rules restrict any use of the information to criminally investigate or   
prosecute any alcohol or drug abuse patient.[facility] HospitalIn the event this   
information is protected by the Federal Confidentiality of Alcohol and Drug 
Abuse   
Patient Records regulations: This information has been disclosed to you from 
records   
protected by Federal confidentiality rules (42 CFR Part 2). The Federal rules   
prohibit you from making any further disclosure of this information unless 
further   
disclosure is expressly permitted by the written consent of the person to whom 
it   
pertains or as otherwise permitted by 42 CFR Part 2. A general authorization for
 the   
release of medical or other information is NOT sufficient for this purpose. The   
Federal rules restrict any use of the information to criminally investigate or   
prosecute any alcohol or drug abuse patient.[facility] HospitalIn the event this   
information is protected by the Federal Confidentiality of Alcohol and Drug 
Abuse   
Patient Records regulations: This information has been disclosed to you from 
records   
protected by Federal confidentiality rules (42 CFR Part 2). The Federal rules   
prohibit you from making any further disclosure of this information unless 
further   
disclosure is expressly permitted by the written consent of the person to whom 
it   
pertains or as otherwise permitted by 42 CFR Part 2. A general authorization for
 the   
release of medical or other information is NOT sufficient for this purpose. The   
Federal rules restrict any use of the information to criminally investigate or   
prosecute any alcohol or drug abuse patient.[facility] HospitalIn the event this   
information is protected by the Federal Confidentiality of Alcohol and Drug 
Abuse   
Patient Records regulations: This information has been disclosed to you from 
records   
protected by Federal confidentiality rules (42 CFR Part 2). The Federal rules   
prohibit you from making any further disclosure of this information unless 
further   
disclosure is expressly permitted by the written consent of the person to whom 
it   
pertains or as otherwise permitted by 42 CFR Part 2. A general authorization for
 the   
release of medical or other information is NOT sufficient for this purpose. The   
Federal rules restrict any use of the information to criminally investigate or   
prosecute any alcohol or drug abuse patient.[facility] HospitalIn the event this   
information is protected by the Federal Confidentiality of Alcohol and Drug 
Abuse   
Patient Records regulations: This information has been disclosed to you from 
records   
protected by Federal confidentiality rules (42 CFR Part 2). The Federal rules   
prohibit you from making any further disclosure of this information unless 
further   
disclosure is expressly permitted by the written consent of the person to whom 
it   
pertains or as otherwise permitted by 42 CFR Part 2. A general authorization for
 the   
release of medical or other information is NOT sufficient for this purpose. The   
Federal rules restrict any use of the information to criminally investigate or   
prosecute any alcohol or drug abuse patient.[facility] HospitalIn the event this   
information is protected by the Federal Confidentiality of Alcohol and Drug 
Abuse   
Patient Records regulations: This information has been disclosed to you from 
records   
protected by Federal confidentiality rules (42 CFR Part 2). The Federal rules   
prohibit you from making any further disclosure of this information unless 
further   
disclosure is expressly permitted by the written consent of the person to whom 
it   
pertains or as otherwise permitted by 42 CFR Part 2. A general authorization for
 the   
release of medical or other information is NOT sufficient for this purpose. The   
Federal rules restrict any use of the information to criminally investigate or   
prosecute any alcohol or drug abuse patient.[facility] HospitalIn the event this   
information is protected by the Federal Confidentiality of Alcohol and Drug 
Abuse   
Patient Records regulations: This information has been disclosed to you from 
records   
protected by Federal confidentiality rules (42 CFR Part 2). The Federal rules   
prohibit you from making any further disclosure of this information unless 
further   
disclosure is expressly permitted by the written consent of the person to whom 
it   
pertains or as otherwise permitted by 42 CFR Part 2. A general authorization for
 the   
release of medical or other information is NOT sufficient for this purpose. The   
Federal rules restrict any use of the information to criminally investigate or   
prosecute any alcohol or drug abuse patient.[facility] HospitalIn the event this   
information is protected by the Federal Confidentiality of Alcohol and Drug 
Abuse   
Patient Records regulations: This information has been disclosed to you from 
records   
protected by Federal confidentiality rules (42 CFR Part 2). The Federal rules   
prohibit you from making any further disclosure of this information unless 
further   
disclosure is expressly permitted by the written consent of the person to whom 
it   
pertains or as otherwise permitted by 42 CFR Part 2. A general authorization for
 the   
release of medical or other information is NOT sufficient for this purpose. The   
Federal rules restrict any use of the information to criminally investigate or   
prosecute any alcohol or drug abuse patient.[facility] HospitalIn the event this   
information is protected by the Federal Confidentiality of Alcohol and Drug 
Abuse   
Patient Records regulations: This information has been disclosed to you from 
records   
protected by Federal confidentiality rules (42 CFR Part 2). The Federal rules   
prohibit you from making any further disclosure of this information unless 
further   
disclosure is expressly permitted by the written consent of the person to whom 
it   
pertains or as otherwise permitted by 42 CFR Part 2. A general authorization for
 the   
release of medical or other information is NOT sufficient for this purpose. The   
Federal rules restrict any use of the information to criminally investigate or   
prosecute any alcohol or drug abuse patient.[facility] HospitalIn the event this   
information is protected by the Federal Confidentiality of Alcohol and Drug 
Abuse   
Patient Records regulations: This information has been disclosed to you from 
records   
protected by Federal confidentiality rules (42 CFR Part 2). The Federal rules   
prohibit you from making any further disclosure of this information unless 
further   
disclosure is expressly permitted by the written consent of the person to whom 
it   
pertains or as otherwise permitted by 42 CFR Part 2. A general authorization for
 the   
release of medical or other information is NOT sufficient for this purpose. The   
Federal rules restrict any use of the information to criminally investigate or   
prosecute any alcohol or drug abuse patient.[facility] HospitalIn the event this   
information is protected by the Federal Confidentiality of Alcohol and Drug 
Abuse   
Patient Records regulations: This information has been disclosed to you from 
records   
protected by Federal confidentiality rules (42 CFR Part 2). The Federal rules   
prohibit you from making any further disclosure of this information unless 
further   
disclosure is expressly permitted by the written consent of the person to whom 
it   
pertains or as otherwise permitted by 42 CFR Part 2. A general authorization for
 the   
release of medical or other information is NOT sufficient for this purpose. The   
Federal rules restrict any use of the information to criminally investigate or   
prosecute any alcohol or drug abuse patient.[facility] HospitalIn the event this   
information is protected by the Federal Confidentiality of Alcohol and Drug 
Abuse   
Patient Records regulations: This information has been disclosed to you from 
records   
protected by Federal confidentiality rules (42 CFR Part 2). The Federal rules   
prohibit you from making any further disclosure of this information unless 
further   
disclosure is expressly permitted by the written consent of the person to whom 
it   
pertains or as otherwise permitted by 42 CFR Part 2. A general authorization for
 the   
release of medical or other information is NOT sufficient for this purpose. The   
Federal rules restrict any use of the information to criminally investigate or   
prosecute any alcohol or drug abuse patient.[facility] HospitalIn the event this   
information is protected by the Federal Confidentiality of Alcohol and Drug 
Abuse   
Patient Records regulations: This information has been disclosed to you from 
records   
protected by Federal confidentiality rules (42 CFR Part 2). The Federal rules   
prohibit you from making any further disclosure of this information unless 
further   
disclosure is expressly permitted by the written consent of the person to whom 
it   
pertains or as otherwise permitted by 42 CFR Part 2. A general authorization for
 the   
release of medical or other information is NOT sufficient for this purpose. The   
Federal rules restrict any use of the information to criminally investigate or   
prosecute any alcohol or drug abuse patient.[facility] HospitalIn the event this   
information is protected by the Federal Confidentiality of Alcohol and Drug 
Abuse   
Patient Records regulations: This information has been disclosed to you from 
records   
protected by Federal confidentiality rules (42 CFR Part 2). The Federal rules   
prohibit you from making any further disclosure of this information unless 
further   
disclosure is expressly permitted by the written consent of the person to whom 
it   
pertains or as otherwise permitted by 42 CFR Part 2. A general authorization for
 the   
release of medical or other information is NOT sufficient for this purpose. The   
Federal rules restrict any use of the information to criminally investigate or   
prosecute any alcohol or drug abuse patient.[facility] HospitalIn the event this   
information is protected by the Federal Confidentiality of Alcohol and Drug 
Abuse   
Patient Records regulations: This information has been disclosed to you from 
records   
protected by Federal confidentiality rules (42 CFR Part 2). The Federal rules   
prohibit you from making any further disclosure of this information unless 
further   
disclosure is expressly permitted by the written consent of the person to whom 
it   
pertains or as otherwise permitted by 42 CFR Part 2. A general authorization for
 the   
release of medical or other information is NOT sufficient for this purpose. The   
Federal rules restrict any use of the information to criminally investigate or   
prosecute any alcohol or drug abuse patient.[facility] HospitalIn the event this   
information is protected by the Federal Confidentiality of Alcohol and Drug 
Abuse   
Patient Records regulations: This information has been disclosed to you from 
records   
protected by Federal confidentiality rules (42 CFR Part 2). The Federal rules   
prohibit you from making any further disclosure of this information unless 
further   
disclosure is expressly permitted by the written consent of the person to whom 
it   
pertains or as otherwise permitted by 42 CFR Part 2. A general authorization for
 the   
release of medical or other information is NOT sufficient for this purpose. The   
Federal rules restrict any use of the information to criminally investigate or   
prosecute any alcohol or drug abuse patient.[facility] HospitalIn the event this   
information is protected by the Federal Confidentiality of Alcohol and Drug 
Abuse   
Patient Records regulations: This information has been disclosed to you from 
records   
protected by Federal confidentiality rules (42 CFR Part 2). The Federal rules   
prohibit you from making any further disclosure of this information unless 
further   
disclosure is expressly permitted by the written consent of the person to whom 
it   
pertains or as otherwise permitted by 42 CFR Part 2. A general authorization for
 the   
release of medical or other information is NOT sufficient for this purpose. The   
Federal rules restrict any use of the information to criminally investigate or   
prosecute any alcohol or drug abuse patient.[facility] HospitalIn the event this   
information is protected by the Federal Confidentiality of Alcohol and Drug 
Abuse   
Patient Records regulations: This information has been disclosed to you from 
records   
protected by Federal confidentiality rules (42 CFR Part 2). The Federal rules   
prohibit you from making any further disclosure of this information unless 
further   
disclosure is expressly permitted by the written consent of the person to whom 
it   
pertains or as otherwise permitted by 42 CFR Part 2. A general authorization for
 the   
release of medical or other information is NOT sufficient for this purpose. The   
Federal rules restrict any use of the information to criminally investigate or   
prosecute any alcohol or drug abuse patient.[facility] HospitalIn the event this   
information is protected by the Federal Confidentiality of Alcohol and Drug 
Abuse   
Patient Records regulations: This information has been disclosed to you from 
records   
protected by Federal confidentiality rules (42 CFR Part 2). The Federal rules   
prohibit you from making any further disclosure of this information unless 
further   
disclosure is expressly permitted by the written consent of the person to whom 
it   
pertains or as otherwise permitted by 42 CFR Part 2. A general authorization for
 the   
release of medical or other information is NOT sufficient for this purpose. The   
Federal rules restrict any use of the information to criminally investigate or   
prosecute any alcohol or drug abuse patient.[facility]  
  
  
  
  
                                                    Reason for Visit (unrecogniz  
ed section and content)  
   
                                          
  
  
  
                                          
   
                                          
  
  
  
                                        Reason              Comments  
   
                                        Orders                
  
  
  
                                Reason          Onset Date      Comments  
   
                                Refill Request  2022        
  
  
  
                                        Reason              Comments  
   
                                        Patient Question      
  
  
  
                                        Reason              Comments  
   
                                        F/U 6 months          
  
  
  
                                        Reason              Comments  
   
                                        Results               
  
  
  
                                        Reason              Comments  
   
                                        Home Care           Confirmation Call  
  
  
  
                                        Reason              Comments  
   
                                        Patient Update        
  
  
  
                                        Reason              Comments  
   
                                        Home Care           PDGM  
  
  
  
                                        Reason              Comments  
   
                                        Home Care             
  
  
  
                                        Reason              Comments  
   
                                        Home Health calling for verbal order   
  
  
  
                                        Reason              Comments  
   
                                        Home Care           Fall without injury  
  
  
  
                                        Reason              Comments  
   
                                        Consult               
   
                                        Orders                
  
  
  
                                        Reason              Comments  
   
                                        New Patient           
  
  
  
                                        Reason              Comments  
   
                                        OT EVAL               
  
  
  
                          Specialty    Diagnoses / Procedures Referred By Contac  
t Referred To Contact  
   
                                                    [facility]                               
  
  
Diagnoses  
  
  
Peripheral   
polyneuropathy  
  
  
Abnormality of gait  
  
  
  
Procedures  
  
  
CONSULT TO OCCUPATIONAL   
THER  
  
  
OCCUPATIONAL THERAPY   
EVAL HIGH COMPLEX 60   
MINS                                      
  
  
Júnior Ahuja MD  
  
  
[address] [place] [address]  
  
  
Phone: [phone]  
  
  
Fax: [phone]                         
  
  
[facility]  
  
  
[address] [place] [address]  
  
  
  
                          Referral ID  Status       Reason       Start   
Date                                    Expiration   
Date                                    Visits   
Requested                               Visits   
Authorized  
   
                                [id number]        Authorized        
  
  
PCP Requested   
Referral  
  
  
Auto-Generate  
d Referral                              10/17/202  
2                   10/17/2023          99                  99  
  
  
  
                                        Reason              Comments  
   
                                        Forms                 
  
  
  
                                        Reason              Comments  
   
                                        New Patient Evaluation   
  
  
  
                                        Reason              Comments  
   
                                        Cirrhosis             
  
  
  
                          Specialty    Diagnoses / Procedures Referred By Contac  
t Referred To Contact  
   
                                                              
  
  
Diagnoses  
  
  
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   
ascites, unspecified hepatic   
cirrhosis type (HCC)  
  
  
Splenomegaly  
  
  
  
Procedures  
  
  
CONSULT TO HEPATOLOGY  
  
  
OFFICE/OUTPATIENT NEW HIGH   
MDM 60-74 MINUTES                         
  
  
Tello Ortega DO  
  
  
[address] [place] [address]  
  
  
Phone: [phone]  
  
  
Fax: [phone]                         
  
  
  
  
  
                    Referral ID Status    Reason    Start Date Expiration Date V  
isits   
Requested                               Visits   
Authorized  
   
                                [id number]        Closed            
  
  
PCP Requested   
Referral        2023        1               1  
  
  
  
                                        Reason              Comments  
   
                                        Rash                  
  
  
  
                                        Reason              Comments  
   
                                        Non-Chemotherapy Treatment   
  
  
  
                          Specialty    Diagnoses / Procedures Referred By Contac  
t Referred To Contact  
   
                                                              
  
  
Diagnoses  
  
  
Iron deficiency anemia due to   
chronic blood loss  
  
  
Iron malabsorption  
                                          
  
  
Tello Ortega, DO  
  
  
[address]  
  
  
[place] [address]  
  
  
Phone: [phone]  
  
  
Fax: [phone]                         
  
  
[facility]  
  
  
[address] [place] [address]  
  
  
Phone: [phone]  
  
  
Fax: [phone]  
  
  
  
                    Referral ID Status    Reason    Start Date Expiration Date V  
isits   
Requested                               Visits   
Authorized  
   
                [id number] Authorized         2023 99      99  
  
  
  
                                        Reason              Comments  
   
                                        Consult             colonoscopy  
  
  
  
                                        Reason              Comments  
   
                                        Appointment           
  
  
  
                                        Reason              Comments  
   
                                        Patient Update        
   
                                        Patient Question      
  
  
  
                                        Reason              Comments  
   
                                        Follow Up             
  
  
  
                                Reason          Onset Date      Comments  
   
                                Refill Request  2023        
  
  
  
                                        Reason              Comments  
   
                                        sores on lower legs X 2 weeks  
  
  
  
                                        Reason              Comments  
   
                                        Consult               
  
  
  
                                        Reason              Comments  
   
                                        [facility] follow-up   
  
  
  
                                        Reason              Comments  
   
                                        Radiology Pre Procedure Instructions   
  
  
  
                                        Reason              Comments  
   
                                        Radiology MRI       liver, under general  
 anesthesia  
  
  
  
                          Specialty    Diagnoses / Procedures Referred By Contac  
t Referred To Contact  
   
                                        MR IMAGING            
  
  
Diagnoses  
  
  
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   
ascites, unspecified hepatic   
cirrhosis type (HCC)  
  
  
Liver mass  
  
  
Splenomegaly  
  
  
  
Procedures  
  
  
MRI LIVER WO/W IVCON  
  
  
MRI ABDOMEN W/O & W/CONTRAST   
MATERIAL                                  
  
  
Maurizio Og PA-C  
  
  
[address] [place] [id number]  
  
  
Phone: [phone]  
  
  
Fax: [phone]                         
  
  
[facility]  
  
  
  
                    Referral ID Status    Reason    Start Date Expiration Date V  
isits   
Requested                               Visits   
Authorized  
   
                                [id number]        Closed            
  
  
Auto-Generate  
d Referral      2023       1               1  
  
  
  
                                        Reason              Comments  
   
                                        Radiology MRI         
  
  
  
                          Specialty    Diagnoses / Procedures Referred By Contac  
t Referred To Contact  
   
                                        MR IMAGING            
  
  
Diagnoses  
  
  
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   
ascites, unspecified hepatic   
cirrhosis type (HCC)  
  
  
Liver mass  
  
  
Splenomegaly  
  
  
  
Procedures  
  
  
MRI LIVER WO/W IVCON  
  
  
MRI ABDOMEN W/O & W/CONTRAST   
MATERIAL                                  
  
  
Maurizio Og PA-C  
  
  
[address] [place] [address]  
  
  
Phone: [phone]  
  
  
Fax: [phone]                         
  
  
[facility]  
  
  
  
                                        Reason              Comments  
   
                                        Established Patient 4cm hcc  
  
  
  
                                Reason          Onset Date      Comments  
   
                                SPP General - Treatment Referral 2023       
 [employer]  
   
                                Insurance Authorization 2023      EDUARDA rubioed  
  
  
  
                                Reason          Onset Date      Comments  
   
                                Simulation Request Form 2023        
  
  
  
                                        Reason              Comments  
   
                                        Patient Education     
  
  
  
                                Reason          Onset Date      Comments  
   
                                Refill Request  2023        
  
  
  
                                Reason          Onset Date      Comments  
   
                                Refill Request  2023        
  
  
  
                                        Reason              Comments  
   
                                        Care Coordinator - Other   
  
  
  
                                        Reason              Comments  
   
                                        OT home health order   
  
  
  
                                        Reason              Comments  
   
                                        [facility] Order Request     
  
  
  
                                        Reason              Comments  
   
                                        [facility]         
  
  
  
                                        Reason              Comments  
   
                                        Physical Therapy Plan of Care   
  
  
  
                                        Reason              Comments  
   
                                        Established Patient   
  
  
  
                                Reason          Onset Date      Comments  
   
                                Refill Request  2023        
  
  
  
                    Referral ID Status    Reason    Start Date Expiration Date V  
isits   
Requested                               Visits   
Authorized  
   
                [id number] Authorized         2023 99      99  
  
  
  
                                        Reason              Comments  
   
                                        Established Patient 3 month follow up  
  
  
  
                                        Reason              Comments  
   
                                        Radiology US          
  
  
  
                          Specialty    Diagnoses / Procedures Referred By Contac  
t Referred To Contact  
   
                                        [facility]            
  
  
Diagnoses  
  
  
Monoclonal gammopathy  
  
  
Macrocytic anemia  
  
  
Thrombocytopenia (HCC)  
  
  
  
Procedures  
  
  
US ABD RT UPPER QUADRANT  
  
  
US ABDOMINAL REAL TIME   
W/IMAGE LIMITED                           
  
  
Tello Ortega DO  
  
  
[address] [place] [address]  
  
  
Phone: [phone]  
  
  
Fax: [phone]                         
  
  
[facility]  
  
  
[place] [address]  
  
  
  
                    Referral ID Status    Reason    Start Date Expiration Date V  
isits   
Requested                               Visits   
Authorized  
   
                                [id number]        Closed            
  
  
Auto-Generate  
d Referral      2022        1               1  
  
  
  
                          Specialty    Diagnoses / Procedures Referred By Contac  
t Referred To Contact  
   
                                        MR IMAGING            
  
  
Diagnoses  
  
  
Liver disease  
  
  
  
Procedures  
  
  
MRI LIVER WO/W IVCON  
  
  
MRI ABDOMEN W/O &   
W/CONTRAST MATERIAL                       
  
  
Marin Fish MD  
  
  
[address] [place] [address]  
  
  
Phone: [phone]  
  
  
Fax: [phone]                         
  
  
[facility]  
  
  
[place] [address]  
  
  
  
                    Referral ID Status    Reason    Start Date Expiration Date V  
isits   
Requested                               Visits   
Authorized  
   
                                [id number]        Closed            
  
  
Auto-Generate  
d Referral      2023       1               1  
  
  
  
                                        Reason              Comments  
   
                                        Appointment Confirmation   
  
  
  
                                        Reason              Comments  
   
                                        Report of wound       
  
  
  
                                        Reason              Comments  
   
                                        Wound Infection       
  
  
  
                                        Reason              Comments  
   
                                        Permission from pt    
  
  
  
                                Reason          Onset Date      Comments  
   
                                Refill Request  2024        
  
  
  
  
  
                                                    Care Teams (unrecognized sec  
tion and content)  
   
                                          
  
  
  
                                          
   
                                          
  
  
  
                      Team Member Relationship Specialty  Start Date End Date  
   
                                                      
  
  
Júnior Ahuja MD  
  
  
[address] [place], [place] [id number] [phone] (Work)  
  
  
[phone] (Fax) PCP - General                   10/22/02          
  
  
  
                      Team Member Relationship Specialty  Start Date End Date  
   
                                                      
  
  
Júnior Ahuja MD  
  
  
[address] [place] [address]  
  
  
[phone] (Work)  
  
  
[phone] (Fax) PCP - General                   10/22/02          
  
  
  
                      Team Member Relationship Specialty  Start Date End Date  
   
                                                      
  
  
Júnior Ahuja MD  
  
  
[address] [facility] [place] [address]  
  
  
[phone] (Work)  
  
  
[phone] (Fax) PCP - General                   10/22/02          
  
  
  
                      Team Member Relationship Specialty  Start Date End Date  
   
                                                      
  
  
Júnior Ahuja MD  
  
  
[address] [facility] [place] [address]  
  
  
[phone] (Work)  
  
  
[phone] (Fax) PCP - General                   10/22/02          
  
  
  
                      Team Member Relationship Specialty  Start Date End Date  
   
                                                      
  
  
Júnior Ahuja MD  
  
  
[address] [facility] [place] [address]  
  
  
[phone] (Work)  
  
  
[phone] (Fax) PCP - General                   10/22/02          
   
                                                      
  
  
Júnior Ahuja MD  
  
  
[address] [facility] [place] [address]  
  
  
[phone] (Work)  
  
  
[phone] (Fax) Referring       Internal Medicine 22           
   
                                                      
  
  
Júnior Ahuja MD  
  
  
[address] [facility] [place] [address]  
  
  
[phone] (Work)  
  
  
[phone] (Fax) Home Care Physician Internal Medicine 22           
  
  
  
                      Team Member Relationship Specialty  Start Date End Date  
   
                                                      
  
  
Júnior Ahuja MD  
  
  
[address] [facility] [place] [address]  
  
  
[phone] (Work)  
  
  
[phone] (Fax) PCP - General                   10/22/02          
   
                                                      
  
  
Júnior Ahuja MD  
  
  
[address] [facility] [place] [address]  
  
  
[phone] (Work)  
  
  
[phone] (Fax) Referring       Internal Medicine 22           
   
                                                      
  
  
Júnior Ahuja MD  
  
  
[address] [facility] [place] [address]  
  
  
[phone] (Work)  
  
  
[phone] (Fax) Home Care Physician Internal Medicine 22           
  
  
  
                      Team Member Relationship Specialty  Start Date End Date  
   
                                                      
  
  
Júnior Ahuja MD  
  
  
[address] [facility] [place] [address]  
  
  
[phone] (Work)  
  
  
[phone] (Fax) PCP - General                   10/22/02          
   
                                                      
  
  
Júnior Ahuja MD  
  
  
[address] [facility] [place] [address]  
  
  
[phone] (Work)  
  
  
[phone] (Fax) Referring       Internal Medicine 22           
   
                                                      
  
  
Júnior Ahuja MD  
  
  
[address] [facility] [place] [address]  
  
  
[phone] (Work)  
  
  
[phone] (Fax) Home Care Physician Internal Medicine 22           
  
  
  
                      Team Member Relationship Specialty  Start Date End Date  
   
                                                      
  
  
Júnior Ahuja MD  
  
  
[address] [facility] [place] [address]  
  
  
[phone] (Work)  
  
  
[phone] (Fax) PCP - General                   10/22/02          
   
                                                      
  
  
Júnior Ahuja MD  
  
  
[address] [facility] [place] [address]  
  
  
[phone] (Work)  
  
  
[phone] (Fax) Referring       Internal Medicine 22           
   
                                                      
  
  
Júnior Ahuja MD  
  
  
[address] [facility] [place] [address]  
  
  
[phone] (Work)  
  
  
[phone] (Fax) Home Care Physician Internal Medicine 22           
   
                                                      
  
  
Brenda Hernandez,   
PT  
  
  
[address] [place] [address]  
  
  
[phone] (Work) Home Care  Acute Care 22           
  
  
  
                      Team Member Relationship Specialty  Start Date End Date  
   
                                                      
  
  
Júnior Ahuja MD  
  
  
[address] [facility] [place] [address]  
  
  
[phone] (Work)  
  
  
[phone] (Fax) PCP - General                   10/22/02          
   
                                                      
  
  
Júnior Ahuja MD  
  
  
[address] [facility] [place] [address]  
  
  
[phone] (Work)  
  
  
[phone] (Fax) Referring       Internal Medicine 22           
   
                                                      
  
  
Júnior Ahuja MD  
  
  
[address] [facility] [place] [address]  
  
  
[phone] (Work)  
  
  
[phone] (Fax) Home Care Physician Internal Medicine 22           
   
                                                      
  
  
Brenda Hernandez,   
PT  
  
  
[address] [place] [address]  
  
  
[phone] (Work) Home Care  Acute Care 22           
  
  
  
                      Team Member Relationship Specialty  Start Date End Date  
   
                                                      
  
  
Júnior Ahuja MD  
  
  
[address] [facility] [place] [address]  
  
  
[phone] (Work)  
  
  
[phone] (Fax) PCP - General                   10/22/02          
   
                                                      
  
  
Júnior Ahuja MD  
  
  
[address] [facility] [place] [address]  
  
  
[phone] (Work)  
  
  
[phone] (Fax) Referring       Internal Medicine 22           
   
                                                      
  
  
Júnior Ahuja MD  
  
  
[address] [facility] [place] [address]  
  
  
[phone] (Work)  
  
  
[phone] (Fax) Home Care Physician Internal Medicine 22           
   
                                                      
  
  
Brenda Hernandez,   
PT  
  
  
[address] [place] [address]  
  
  
[phone] (Work) Home Care  Acute Care 22           
  
  
  
                      Team Member Relationship Specialty  Start Date End Date  
   
                                                      
  
  
Júnior Ahuja MD  
  
  
[address] [facility] [place] [address]  
  
  
[phone] (Work)  
  
  
[phone] (Fax) PCP - General                   10/22/02          
   
                                                      
  
  
Júnior Ahuja MD  
  
  
[address] [facility] [place] [address]  
  
  
[phone] (Work)  
  
  
[phone] (Fax) Referring       Internal Medicine 22           
   
                                                      
  
  
Júnior Ahuja MD  
  
  
[address] [facility] [place] [address]  
  
  
[phone] (Work)  
  
  
[phone] (Fax) Home Care Physician Internal Medicine 22           
   
                                                      
  
  
Brenda Hernandez,   
PT  
  
  
[address] [place] [address]  
  
  
[phone] (Work) Home Care  Acute Care 22           
  
  
  
                      Team Member Relationship Specialty  Start Date End Date  
   
                                                      
  
  
Júnior Ahuja MD  
  
  
[address] [facility] [place] [address]  
  
  
[phone] (Work)  
  
  
[phone] (Fax) PCP - General                   10/22/02          
   
                                                      
  
  
Júnior Ahuja MD  
  
  
[address] [facility] [place] [address]  
  
  
[phone] (Work)  
  
  
[phone] (Fax) Referring       Internal Medicine 22           
   
                                                      
  
  
Júnior Ahuja MD  
  
  
[address] [facility] [place] [address]  
  
  
[phone] (Work)  
  
  
[phone] (Fax) Home Care Physician Internal Medicine 22           
   
                                                      
  
  
Brenda Hernandez,   
PT  
  
  
[address] [place] [address]  
  
  
[phone] (Work) Home Care  Acute Care 22           
  
  
  
                      Team Member Relationship Specialty  Start Date End Date  
   
                                                      
  
  
Júnior Ahuja MD  
  
  
[address] [facility] [place] [address]  
  
  
[phone] (Work)  
  
  
[phone] (Fax) PCP - General                   10/22/02          
   
                                                      
  
  
Júnior Ahuja MD  
  
  
[address] [facility] [place] [address]  
  
  
[phone] (Work)  
  
  
[phone] (Fax) Referring       Internal Medicine 22           
   
                                                      
  
  
Júnior Ahuja MD  
  
  
[address] [facility] [place] [address]  
  
  
[phone] (Work)  
  
  
[phone] (Fax) Home Care Physician Internal Medicine 22           
   
                                                      
  
  
Brenda Hernandez,   
PT  
  
  
[address] [place] [address]  
  
  
[phone] (Work) Home Care  Acute Care 22           
  
  
  
                      Team Member Relationship Specialty  Start Date End Date  
   
                                                      
  
  
Júnior Ahuja MD  
  
  
[address] [facility] [place] [address]  
  
  
[phone] (Work)  
  
  
[phone] (Fax) PCP - General                   10/22/02          
   
                                                      
  
  
Júnior Ahuja MD  
  
  
[address] [facility] [place] [address]  
  
  
[phone] (Work)  
  
  
[phone] (Fax) Referring       Internal Medicine 22           
   
                                                      
  
  
Júnior Ahuja MD  
  
  
[address] [facility] [place] [address]  
  
  
[phone] (Work)  
  
  
[phone] (Fax) Home Care Provider Internal Medicine 22           
   
                                                      
  
  
Brenda Hernandez,   
PT  
  
  
[address] [place] [address]  
  
  
[phone] (Work) Home Care  Acute Care 22           
  
  
  
                      Team Member Relationship Specialty  Start Date End Date  
   
                                                      
  
  
Júnior Ahuja MD  
  
  
[address] [facility] [place] [address]  
  
  
[phone] (Work)  
  
  
[phone] (Fax) PCP - General                   10/22/02          
   
                                                      
  
  
Júnior Ahuja MD  
  
  
[address] [facility] [place] [address]  
  
  
[phone] (Work)  
  
  
[phone] (Fax) Referring       Internal Medicine 22           
   
                                                      
  
  
Júnior Ahuja MD  
  
  
[address] [facility] [place] [address]  
  
  
[phone] (Work)  
  
  
[phone] (Fax) Home Care Provider Internal Medicine 22           
   
                                                      
  
  
Brenda Hernandez,   
PT  
  
  
[address] [place] [address]  
  
  
[phone] (Work) Home Care  Acute Care 22           
  
  
  
                      Team Member Relationship Specialty  Start Date End Date  
   
                                                      
  
  
Júnior Ahuja MD  
  
  
[address] [facility] [place] [address]  
  
  
[phone] (Work)  
  
  
[phone] (Fax) PCP - General                   10/22/02          
   
                                                      
  
  
Júnior Ahuja MD  
  
  
[address] [facility] [place] [address]  
  
  
[phone] (Work)  
  
  
[phone] (Fax) Referring       Internal Medicine 22           
   
                                                      
  
  
Júnior Ahuja MD  
  
  
[address] [facility] [place] [address]  
  
  
[phone] (Work)  
  
  
[phone] (Fax) Home Care Provider Internal Medicine 22           
   
                                                      
  
  
Brenda Hernandez,   
PT  
  
  
[address] [place] [address]  
  
  
[phone] (Work) Home Care  Acute Care 22           
  
  
  
                      Team Member Relationship Specialty  Start Date End Date  
   
                                                      
  
  
Júnior Ahuja MD  
  
  
[address] [facility] [place] [address]  
  
  
[phone] (Work)  
  
  
[phone] (Fax) PCP - General                   10/22/02          
   
                                                      
  
  
Júnior Ahuja MD  
  
  
[address] [facility] [place] [address]  
  
  
[phone] (Work)  
  
  
[phone] (Fax) Referring       Internal Medicine 22           
   
                                                      
  
  
Júnior Ahuja MD  
  
  
[address] [facility] [place] [address]  
  
  
[phone] (Work)  
  
  
[phone] (Fax) Home Care Provider Internal Medicine 22           
   
                                                      
  
  
Brenda Hernandez,   
PT  
  
  
[address] [place] [address]  
  
  
[phone] (Work) Home Care  Acute Care 22           
  
  
  
                      Team Member Relationship Specialty  Start Date End Date  
   
                                                      
  
  
Júnior Ahuja MD  
  
  
[address] [facility] [place] [address]  
  
  
[phone] (Work)  
  
  
[phone] (Fax) PCP - General                   10/22/02          
   
                                                      
  
  
Júnior Ahuja MD  
  
  
[address] [facility] [place] [address]  
  
  
[phone] (Work)  
  
  
[phone] (Fax) Referring       Internal Medicine 22           
   
                                                      
  
  
Júnior Ahuja MD  
  
  
[address] [facility] [place] [address]  
  
  
[phone] (Work)  
  
  
[phone] (Fax) Home Care Provider Internal Medicine 22           
   
                                                      
  
  
Brenda Hernandez,   
PT  
  
  
[address] [facility] [place] [address]  
  
  
[phone] (Work) Home Care  Acute Care 22           
  
  
  
                      Team Member Relationship Specialty  Start Date End Date  
   
                                                      
  
  
Júnior Ahuja MD  
  
  
[address] [facility] [place] [address]  
  
  
[phone] (Work)  
  
  
[phone] (Fax) PCP - General                   10/22/02          
   
                                                      
  
  
Júnior Ahuja MD  
  
  
[address] [facility] [place] [address]  
  
  
[phone] (Work)  
  
  
[phone] (Fax) Referring       Internal Medicine 22           
   
                                                      
  
  
Júnior Ahuja MD  
  
  
[address] [facility] [place] [address]  
  
  
[phone] (Work)  
  
  
[phone] (Fax) Home Care Provider Internal Medicine 22           
   
                                                      
  
  
Brenda Hernandez,   
PT  
  
  
[address] [facility] [place] [address]  
  
  
[phone] (Work) Home Care  Acute Care 22           
  
  
  
                      Team Member Relationship Specialty  Start Date End Date  
   
                                                      
  
  
Júnior Ahuja MD  
  
  
[address] [facility] [place] [address]  
  
  
[phone] (Work)  
  
  
[phone] (Fax) PCP - General                   10/22/02          
   
                                                      
  
  
Júnior Ahuja MD  
  
  
[address] [facility] [place] [address]  
  
  
[phone] (Work)  
  
  
[phone] (Fax) Referring       Internal Medicine 22           
   
                                                      
  
  
Júnior Ahuja MD  
  
  
[address] [facility] [place] [address]  
  
  
[phone] (Work)  
  
  
[phone] (Fax) Home Care Provider Internal Medicine 22           
   
                                                      
  
  
Brenda Hernandez,   
PT  
  
  
[address] [place] [address]  
  
  
[phone] (Work) Home Care  Acute Care 22           
  
  
  
                      Team Member Relationship Specialty  Start Date End Date  
   
                                                      
  
  
Júnior Ahuja MD  
  
  
[address] [facility] [place] [address]  
  
  
[phone] (Work)  
  
  
[phone] (Fax) PCP - General                   10/22/02          
   
                                                      
  
  
Júnior Ahuja MD  
  
  
[address] [facility] [place] [address]  
  
  
[phone] (Work)  
  
  
[phone] (Fax) Referring       Internal Medicine 22           
   
                                                      
  
  
Júnior Ahuja MD  
  
  
[address] [facility] [place] [address]  
  
  
[phone] (Work)  
  
  
[phone] (Fax) Home Care Provider Internal Medicine 22           
   
                                                      
  
  
Brenda Hernandez,   
PT  
  
  
[address] [place] [address]  
  
  
[phone] (Work) Home Care  Acute Care 22           
  
  
  
                      Team Member Relationship Specialty  Start Date End Date  
   
                                                      
  
  
Júnior Ahuja MD  
  
  
[address] [facility] [place] [address]  
  
  
[phone] (Work)  
  
  
[phone] (Fax) PCP - General                   10/22/02          
   
                                                      
  
  
Júnior Ahuja MD  
  
  
[address] [facility] [place] [address]  
  
  
[phone] (Work)  
  
  
[phone] (Fax) Referring       Internal Medicine 22           
   
                                                      
  
  
Júnior Ahuja MD  
  
  
[address] [facility] [place] [address]  
  
  
[phone] (Work)  
  
  
[phone] (Fax) Home Care Provider Internal Medicine 22           
   
                                                      
  
  
Brenda Hernandez,   
PT  
  
  
[address] [place] [address]  
  
  
[phone] (Work) Home Care  Acute Care 22           
  
  
  
                      Team Member Relationship Specialty  Start Date End Date  
   
                                                      
  
  
Júnior Ahuja MD  
  
  
[address] [facility] [place] [address]  
  
  
[phone] (Work)  
  
  
[phone] (Fax) PCP - General                   10/22/02          
   
                                                      
  
  
Júnior Ahuja MD  
  
  
[address] [facility] [place] [address]  
  
  
[phone] (Work)  
  
  
[phone] (Fax) Referring       Internal Medicine 22           
   
                                                      
  
  
Júnior Ahuja MD  
  
  
[address] [facility] [place] [address]  
  
  
[phone] (Work)  
  
  
[phone] (Fax) Home Care Provider Internal Medicine 22           
   
                                                      
  
  
Brenda Hernandez,   
PT  
  
  
[address] [place] [address]  
  
  
[phone] (Work) Home Care  Acute Care 22           
  
  
  
                      Team Member Relationship Specialty  Start Date End Date  
   
                                                      
  
  
Júnior Ahuja MD  
  
  
[address] [facility] [place] [address]  
  
  
[phone] (Work)  
  
  
[phone] (Fax) PCP - General                   10/22/02          
   
                                                      
  
  
Júnior Ahuja MD  
  
  
[address] [facility] [place] [address]  
  
  
[phone] (Work)  
  
  
[phone] (Fax) Referring       Internal Medicine 22           
   
                                                      
  
  
Júnior Ahuja MD  
  
  
[address] [facility] [place] [address]  
  
  
[phone] (Work)  
  
  
[phone] (Fax) Home Care Provider Internal Medicine 22           
   
                                                      
  
  
Brenda Hernandez,   
PT  
  
  
[address] [place] [address]  
  
  
[phone] (Work) Home Care  Acute Care 22           
  
  
  
                      Team Member Relationship Specialty  Start Date End Date  
   
                                                      
  
  
Júnior Ahuja MD  
  
  
[address] [facility] [place] [address]  
  
  
[phone] (Work)  
  
  
[phone] (Fax) PCP - General                   10/22/02          
   
                                                      
  
  
Júnior Ahuja MD  
  
  
[address] [facility] [place] [address]  
  
  
[phone] (Work)  
  
  
[phone] (Fax) Referring       Internal Medicine 22           
   
                                                      
  
  
Júnior Ahuja MD  
  
  
[address] [facility] [place] [address]  
  
  
[phone] (Work)  
  
  
[phone] (Fax) Home Care Provider Internal Medicine 22           
   
                                                      
  
  
Brenda Hernandez,   
PT  
  
  
[address] [place] [address]  
  
  
[phone] (Work) Home Care  Acute Care 22           
  
  
  
                      Team Member Relationship Specialty  Start Date End Date  
   
                                                      
  
  
Júnior Ahuja MD  
  
  
[address] [facility] [place] [address]  
  
  
[phone] (Work)  
  
  
[phone] (Fax) PCP - General                   10/22/02          
   
                                                      
  
  
Júnior Ahuja MD  
  
  
[address] [facility] [place] [address]  
  
  
[phone] (Work)  
  
  
[phone] (Fax) Referring       Internal Medicine 22           
   
                                                      
  
  
Júnior Ahuja MD  
  
  
[address] [facility] [place] [address]  
  
  
[phone] (Work)  
  
  
[phone] (Fax) Home Care Provider Internal Medicine 22           
   
                                                      
  
  
Brenda Hernandez,   
PT  
  
  
[address] [place] [address]  
  
  
[phone] (Work) Home Care  Acute Care 22           
  
  
  
                      Team Member Relationship Specialty  Start Date End Date  
   
                                                      
  
  
Júnior Ahuja MD  
  
  
[address] [facility] [place] [address]  
  
  
[phone] (Work)  
  
  
[phone] (Fax) PCP - General                   10/22/02          
   
                                                      
  
  
Júnior Ahuja MD  
  
  
[address] [facility] [place] [address]  
  
  
[phone] (Work)  
  
  
[phone] (Fax) Referring       Internal Medicine 22           
   
                                                      
  
  
Júnior Ahuja MD  
  
  
[address] [facility] [place] [address]  
  
  
[phone] (Work)  
  
  
[phone] (Fax) Home Care Provider Internal Medicine 22           
   
                                                      
  
  
Brenda Hernandez,   
PT  
  
  
[address] [place] [address]  
  
  
[phone] (Work) Home Care  Acute Care 22           
  
  
  
                      Team Member Relationship Specialty  Start Date End Date  
   
                                                      
  
  
Júnior Ahuja MD  
  
  
[address] [facility] [place] [address]  
  
  
[phone] (Work)  
  
  
[phone] (Fax) PCP - General                   10/22/02          
   
                                                      
  
  
Júnior Ahuja MD  
  
  
[address] [facility] [place] [address]  
  
  
[phone] (Work)  
  
  
[phone] (Fax) Referring       Internal Medicine 22           
   
                                                      
  
  
Júnior Ahuja MD  
  
  
[address] [facility] [place] [address]  
  
  
[phone] (Work)  
  
  
[phone] (Fax) Home Care Provider Internal Medicine 22           
   
                                                      
  
  
Brenda Hernandez,   
PT  
  
  
[address] [place] [address]  
  
  
[phone] (Work) Home Care  Acute Care 22           
  
  
  
                      Team Member Relationship Specialty  Start Date End Date  
   
                                                      
  
  
Júnior Ahuja MD  
  
  
[address] [facility] [place] [address]  
  
  
[phone] (Work)  
  
  
[phone] (Fax) PCP - General                   10/22/02          
   
                                                      
  
  
Júnior Ahuja MD  
  
  
[address] [facility] [place] [address]  
  
  
[phone] (Work)  
  
  
[phone] (Fax) Referring       Internal Medicine 22           
   
                                                      
  
  
Júnior Ahuja MD  
  
  
[address] [facility] [place] [address]  
  
  
[phone] (Work)  
  
  
[phone] (Fax) Home Care Provider Internal Medicine 22           
   
                                                      
  
  
Brenda Hernandez,   
PT  
  
  
[address] [place] [address]  
  
  
[phone] (Work) Home Care  Acute Care 22           
  
  
  
                      Team Member Relationship Specialty  Start Date End Date  
   
                                                      
  
  
Júnior Ahuja MD  
  
  
[address] [facility] [place] [address]  
  
  
[phone] (Work)  
  
  
[phone] (Fax) PCP - General                   10/22/02          
   
                                                      
  
  
Júnior Ahuja MD  
  
  
[address] [facility] [place] [address]  
  
  
[phone] (Work)  
  
  
[phone] (Fax) Referring       Internal Medicine 22           
   
                                                      
  
  
Júnior Ahuja MD  
  
  
[address] [facility] [place] [address]  
  
  
[phone] (Work)  
  
  
[phone] (Fax) Home Care Provider Internal Medicine 22           
   
                                                      
  
  
Brenda Hernandez,   
PT  
  
  
[address] [place] [address]  
  
  
[phone] (Work) Home Care  Acute Care 22           
  
  
  
                      Team Member Relationship Specialty  Start Date End Date  
   
                                                      
  
  
Júnior Ahuja MD  
  
  
[address] [facility] [place] [address]  
  
  
[phone] (Work)  
  
  
[phone] (Fax) PCP - General                   10/22/02          
   
                                                      
  
  
Júnior Ahuja MD  
  
  
[address] [facility] [place] [address]  
  
  
[phone] (Work)  
  
  
[phone] (Fax) Referring       Internal Medicine 22           
   
                                                      
  
  
Júnior Ahuja MD  
  
  
[address] [facility] [place] [address]  
  
  
[phone] (Work)  
  
  
[phone] (Fax) Home Care Provider Internal Medicine 22           
   
                                                      
  
  
Brenda Hernandez,   
PT  
  
  
[address] [place] [address]  
  
  
[phone] (Work) Home Care  Acute Care 22           
  
  
  
                      Team Member Relationship Specialty  Start Date End Date  
   
                                                      
  
  
Júnior Ahuja MD  
  
  
[address] [facility] [place] [address]  
  
  
[phone] (Work)  
  
  
[phone] (Fax) PCP - General                   10/22/02          
   
                                                      
  
  
Júnior Ahuja MD  
  
  
[address] [facility] [place] [address]  
  
  
[phone] (Work)  
  
  
[phone] (Fax) Referring       Internal Medicine 22           
   
                                                      
  
  
Júnior Ahuja MD  
  
  
[address] [facility] [place] [address]  
  
  
[phone] (Work)  
  
  
[phone] (Fax) Home Care Provider Internal Medicine 22           
   
                                                      
  
  
Brenda Hernandez,   
PT  
  
  
[address] [place] [address]  
  
  
[phone] (Work) Home Care  Acute Care 22           
  
  
  
                      Team Member Relationship Specialty  Start Date End Date  
   
                                                      
  
  
Júnior Ahuja MD  
  
  
[address] [facility] [place] [address]  
  
  
[phone] (Work)  
  
  
[phone] (Fax) PCP - General                   10/22/02          
   
                                                      
  
  
Júnior Ahuja MD  
  
  
[address] [facility] [place] [address]  
  
  
[phone] (Work)  
  
  
[phone] (Fax) Referring       Internal Medicine 22           
   
                                                      
  
  
Júnior Ahuja MD  
  
  
[address] [facility] [place] [address]  
  
  
[phone] (Work)  
  
  
[phone] (Fax) Home Care Provider Internal Medicine 22           
   
                                                      
  
  
Brenda Hernandez,   
PT  
  
  
[address] [place] [address]  
  
  
[phone] (Work) Home Care  Acute Care 22           
  
  
  
                      Team Member Relationship Specialty  Start Date End Date  
   
                                                      
  
  
Júnior Ahuja MD  
  
  
[address] [facility] [place] [address]  
  
  
[phone] (Work)  
  
  
[phone] (Fax) PCP - General                   10/22/02          
   
                                                      
  
  
Júnior Ahuja MD  
  
  
[address] [facility] [place] [address]  
  
  
[phone] (Work)  
  
  
[phone] (Fax) Referring       Internal Medicine 22           
   
                                                      
  
  
Júnior Ahuja MD  
  
  
[address] [facility] [place] [address]  
  
  
[phone] (Work)  
  
  
[phone] (Fax) Home Care Provider Internal Medicine 22           
   
                                                      
  
  
Brenda Hernandez,   
PT  
  
  
[address] [place] [address]  
  
  
[phone] (Work) Home Care  Acute Care 22           
  
  
  
                      Team Member Relationship Specialty  Start Date End Date  
   
                                                      
  
  
Júnior Ahuja MD  
  
  
[address] [facility] [place] [address]  
  
  
[phone] (Work)  
  
  
[phone] (Fax) PCP - General                   10/22/02          
   
                                                      
  
  
Júnior Ahuja MD  
  
  
[address] [facility] [place] [address]  
  
  
[phone] (Work)  
  
  
[phone] (Fax) Referring       Internal Medicine 22           
   
                                                      
  
  
Júnior Ahuaj MD  
  
  
[address] [facility] [place] [address]  
  
  
[phone] (Work)  
  
  
[phone] (Fax) Home Care Provider Internal Medicine 22           
   
                                                      
  
  
Brenda Hernandez,   
PT  
  
  
[address] [place] [address]  
  
  
[phone] (Work) Home Care  Acute Care 22           
  
  
  
                      Team Member Relationship Specialty  Start Date End Date  
   
                                                      
  
  
Júnior Ahuja MD  
  
  
[address] [facility] [place] [address]  
  
  
[phone] (Work)  
  
  
[phone] (Fax) PCP - General                   10/22/02          
   
                                                      
  
  
Júnior Ahuja MD  
  
  
[address] [facility] [place] [address]  
  
  
[phone] (Work)  
  
  
[phone] (Fax) Referring       Internal Medicine 22           
   
                                                      
  
  
Júnior Ahuja MD  
  
  
[address] [facility] [place] [address]  
  
  
[phone] (Work)  
  
  
[phone] (Fax) Home Care Provider Internal Medicine 22           
   
                                                      
  
  
Brenda Hernandez,   
PT  
  
  
[address] [place] [address]  
  
  
[phone] (Work) Home Care  Acute Care 22           
  
  
  
                      Team Member Relationship Specialty  Start Date End Date  
   
                                                      
  
  
Júnior Ahuja MD  
  
  
[address] [facility] [place] [address]  
  
  
[phone] (Work)  
  
  
[phone] (Fax) PCP - General                   10/22/02          
   
                                                      
  
  
Júnior Ahuja MD  
  
  
[address] [facility] [place] [address]  
  
  
[phone] (Work)  
  
  
[phone] (Fax) Referring       Internal Medicine 22           
   
                                                      
  
  
Júnior Ahuja MD  
  
  
[address] [facility] [place] [address]  
  
  
[phone] (Work)  
  
  
[phone] (Fax) Home Care Provider Internal Medicine 22           
   
                                                      
  
  
Brenda Hernandez,   
PT  
  
  
[address] [place] [address]  
  
  
[phone] (Work) Home Care  Acute Care 22           
   
                                                      
  
  
Maurizio Og PA-C  
  
  
[address] [place] [address]  
  
  
[phone] (Work)  
  
  
[phone] (Fax)                 Gastroenterology 23           
   
                                                      
  
  
Nathalia Bernal PA-C  
  
  
[address]  
  
  
[place] [address]  
  
  
[phone] (Work)  
  
  
[phone] (Fax)                 General Surgery 23           
  
  
  
                      Team Member Relationship Specialty  Start Date End Date  
   
                                                      
  
  
Júnior Ahuja MD  
  
  
[address] [place] [address]  
  
  
[phone] (Work)  
  
  
[phone] (Fax) PCP - General                   10/22/02          
   
                                                      
  
  
Júnior Ahuja MD  
  
  
[facility] [place] [address]  
  
  
[phone] (Work)  
  
  
[phone] (Fax) Referring       Internal Medicine 22           
   
                                                      
  
  
Júnior Ahuja MD  
  
  
[facility] [place] [address]  
  
  
[phone] (Work)  
  
  
[phone] (Fax) Home Care Provider Internal Medicine 22           
   
                                                      
  
  
Brenda Hernandez,   
PT  
  
  
[address] [place] [address]  
  
  
[phone] (Work) Home Care  Acute Care 22           
   
                                                      
  
  
Maurizio Og PA-C  
  
  
[address] [place] [address]  
  
  
[phone] (Work)  
  
  
[phone] (Fax)                 Gastroenterology 23           
   
                                                      
  
  
Nathalia Bernal PA-C  
  
  
[address]  
  
  
[place] [address]  
  
  
[phone] (Work)  
  
  
[phone] (Fax)                 General Surgery 23           
  
  
  
                      Team Member Relationship Specialty  Start Date End Date  
   
                                                      
  
  
Júnior Ahuja MD  
  
  
[address] [place] [address]  
  
  
[phone] (Work)  
  
  
[phone] (Fax) PCP - General                   10/22/02          
   
                                                      
  
  
Júnior Ahuja MD  
  
  
[address] [place] [address]  
  
  
[phone] (Work)  
  
  
[phone] (Fax) Referring       Internal Medicine 22           
   
                                                      
  
  
Júnior Ahuja MD  
  
  
[address] [facility] [place] [address]  
  
  
[phone] (Work)  
  
  
[phone] (Fax) Home Care Provider Internal Medicine 22           
   
                                                      
  
  
Brenda Hernandez,   
PT  
  
  
[address] [place] [address]  
  
  
[phone] (Work) Home Care  Acute Care 22           
   
                                                      
  
  
Maurizio Og PA-C  
  
  
[address] [place] [address]  
  
  
[phone] (Work)  
  
  
[phone] (Fax)                 Gastroenterology 23           
   
                                                      
  
  
Nathalia Bernal PA-C  
  
  
[address]  
  
  
[place] [address]  
  
  
[phone] (Work)  
  
  
[phone] (Fax)                 General Surgery 23           
  
  
  
                      Team Member Relationship Specialty  Start Date End Date  
   
                                                      
  
  
Júnior Ahuja MD  
  
  
[address] [facility] [place] [address]  
  
  
[phone] (Work)  
  
  
[phone] (Fax) PCP - General                   10/22/02          
   
                                                      
  
  
Júnior Ahuja MD  
  
  
[address] [facility] [place] [address]  
  
  
[phone] (Work)  
  
  
[phone] (Fax) Referring       Internal Medicine 22           
   
                                                      
  
  
Júnior Ahuja MD  
  
  
[address] [facility] [place] [address]  
  
  
[phone] (Work)  
  
  
[phone] (Fax) Home Care Provider Internal Medicine 22           
   
                                                      
  
  
Brenda Hernandez,   
PT  
  
  
[address] [place] [address]  
  
  
[phone] (Work) Home Care  Acute Care 22           
   
                                                      
  
  
Maurizio Og PA-C  
  
  
[address] [place] [address]  
  
  
[phone] (Work)  
  
  
[phone] (Fax)                 Gastroenterology 23           
   
                                                      
  
  
Nathalia Bernal PA-C  
  
  
[address]  
  
  
[place] [address]  
  
  
[phone] (Work)  
  
  
[phone] (Fax)                 General Surgery 23           
  
  
  
                      Team Member Relationship Specialty  Start Date End Date  
   
                                                      
  
  
Júnior Ahuja MD  
  
  
[address] [facility] [place] [address]  
  
  
[phone] (Work)  
  
  
[phone] (Fax) PCP - General                   10/22/02          
   
                                                      
  
  
Júnior Ahuja MD  
  
  
[address] [facility] [place] [address]  
  
  
[phone] (Work)  
  
  
[phone] (Fax) Referring       Internal Medicine 22           
   
                                                      
  
  
Júnior Ahuja MD  
  
  
[address] [facility] [place] [address]  
  
  
[phone] (Work)  
  
  
[phone] (Fax) Home Care Provider Internal Medicine 22           
   
                                                      
  
  
Brenda Hernandez,   
PT  
  
  
[address] [place] [address]  
  
  
[phone] (Work) Home Care  Acute Care 22           
   
                                                      
  
  
Maurizio Og PA-C  
  
  
[address] [place] [address]  
  
  
[phone] (Work)  
  
  
[phone] (Fax)                 Gastroenterology 23           
   
                                                      
  
  
Nathalia Bernal PA-C  
  
  
[address] [facility] [place] [address]  
  
  
[phone] (Work)  
  
  
[phone] (Fax)                 General Surgery 23           
  
  
  
                      Team Member Relationship Specialty  Start Date End Date  
   
                                                      
  
  
Júnior Ahuja MD  
  
  
[address] [facility] [place] [address]  
  
  
[phone] (Work)  
  
  
[phone] (Fax) PCP - General                   10/22/02          
   
                                                      
  
  
Júnior Ahuja MD  
  
  
[address] [facility] [place] [address]  
  
  
[phone] (Work)  
  
  
[phone] (Fax) Referring       Internal Medicine 22           
   
                                                      
  
  
Júnior Ahuja MD  
  
  
[address] [facility] [place] [address]  
  
  
[phone] (Work)  
  
  
[phone] (Fax) Home Care Provider Internal Medicine 22           
   
                                                      
  
  
Brenda Hernandez,   
PT  
  
  
[address] [place] [address]  
  
  
[phone] (Work) Home Care  Acute Care 22           
   
                                                      
  
  
Maurizio Og PA-C  
  
  
[address] [place] [address]  
  
  
[phone] (Work)  
  
  
[phone] (Fax)                 Gastroenterology 23           
   
                                                      
  
  
Nathalia Bernal PA-C  
  
  
[address]  
  
  
[place] [address]  
  
  
[phone] (Work)  
  
  
[phone] (Fax)                 General Surgery 23           
  
  
  
                      Team Member Relationship Specialty  Start Date End Date  
   
                                                      
  
  
Júnior Ahuja MD  
  
  
[address] [place] [address]  
  
  
[phone] (Work)  
  
  
[phone] (Fax) PCP - General                   10/22/02          
   
                                                      
  
  
Júnior Ahuja MD  
  
  
[address] [facility] [place] [address]  
  
  
[phone] (Work)  
  
  
[phone] (Fax) Referring       Internal Medicine 22           
   
                                                      
  
  
Júnior Ahuja MD  
  
  
[address] [place] [address]  
  
  
[phone] (Work)  
  
  
[phone] (Fax) Home Care Provider Internal Medicine 22           
   
                                                      
  
  
Brenda Hernandez,   
PT  
  
  
[address] [place] [address]  
  
  
[phone] (Work) Home Care  Acute Care 22           
   
                                                      
  
  
Maurizio Og PA-C  
  
  
[address] [place] [address]  
  
  
[phone] (Work)  
  
  
[phone] (Fax)                 Gastroenterology 23           
   
                                                      
  
  
Nathalia Bernal PA-C  
  
  
[address]  
  
  
[place] [address]  
  
  
[phone] (Work)  
  
  
[phone] (Fax)                 General Surgery 23           
  
  
  
                      Team Member Relationship Specialty  Start Date End Date  
   
                                                      
  
  
Júnior Ahuja MD  
  
  
[address] [facility] [place] [address]  
  
  
[phone] (Work)  
  
  
[phone] (Fax) PCP - General                   10/22/02          
   
                                                      
  
  
Júnior Ahuja MD  
  
  
[address] [facility] [place] [address]  
  
  
[phone] (Work)  
  
  
[phone] (Fax) Referring       Internal Medicine 22           
   
                                                      
  
  
Júnior Ahuja MD  
  
  
[address] [facility] [place] [address]  
  
  
[phone] (Work)  
  
  
[phone] (Fax) Home Care Provider Internal Medicine 22           
   
                                                      
  
  
Brenda Hernandez,   
PT  
  
  
[address] [place] [address]  
  
  
[phone] (Work) Home Care  Acute Care 22           
   
                                                      
  
  
Maurizio Og PA-C  
  
  
[address] [place] [address]  
  
  
[phone] (Work)  
  
  
[phone] (Fax)                 Gastroenterology 23           
   
                                                      
  
  
Nathalia Bernal PA-C  
  
  
[address]  
  
  
[place] [address]  
  
  
[phone] (Work)  
  
  
[phone] (Fax)                 General Surgery 23           
  
  
  
                      Team Member Relationship Specialty  Start Date End Date  
   
                                                      
  
  
Júnior Ahuja MD  
  
  
[address] [place] [address]  
  
  
[phone] (Work)  
  
  
[phone] (Fax) PCP - General                   10/22/02          
   
                                                      
  
  
Júnior Ahuja MD  
  
  
[address] [facility] [place] [address]  
  
  
[phone] (Work)  
  
  
[phone] (Fax) Referring       Internal Medicine 22           
   
                                                      
  
  
Júnior Ahuja MD  
  
  
[address] [facility] [place] [address]  
  
  
[phone] (Work)  
  
  
[phone] (Fax) Home Care Provider Internal Medicine 22           
   
                                                      
  
  
Brenda Hernandez,   
PT  
  
  
[address] [place] [address]  
  
  
[phone] (Work) Home Care  Acute Care 22           
   
                                                      
  
  
Maurizio Og PA-C  
  
  
[address] [place] [address]  
  
  
[phone] (Work)  
  
  
[phone] (Fax)                 Gastroenterology 23           
   
                                                      
  
  
Nathalia Bernal PA-C  
  
  
[address]  
  
  
[place] [address]  
  
  
[phone] (Work)  
  
  
[phone] (Fax)                 General Surgery 23           
  
  
  
                      Team Member Relationship Specialty  Start Date End Date  
   
                                                      
  
  
Júnior Ahuja MD  
  
  
[address] [place] [address]  
  
  
[phone] (Work)  
  
  
[phone] (Fax) PCP - General                   10/22/02          
   
                                                      
  
  
Júnior Ahuja MD  
  
  
[address] [place] [address]  
  
  
[phone] (Work)  
  
  
[phone] (Fax) Referring       Internal Medicine 22           
   
                                                      
  
  
Júnior Ahuja MD  
  
  
[address] [place] [address]  
  
  
[phone] (Work)  
  
  
[phone] (Fax) Home Care Provider Internal Medicine 22           
   
                                                      
  
  
Brenda Hernandez,   
PT  
  
  
[address] [place] [address]  
  
  
[phone] (Work) Home Care  Acute Care 22           
   
                                                      
  
  
Maurizio Og PA-C  
  
  
[address] [place] [address]  
  
  
[phone] (Work)  
  
  
[phone] (Fax)                 Gastroenterology 23           
   
                                                      
  
  
Nathalia Bernal PA-C  
  
  
[address] [place] [address]  
  
  
[phone] (Work)  
  
  
[phone] (Fax)                 General Surgery 23           
  
  
  
                      Team Member Relationship Specialty  Start Date End Date  
   
                                                      
  
  
Júnior Ahuja MD  
  
  
[address] [place] [address]  
  
  
[phone] (Work)  
  
  
[phone] (Fax) PCP - General                   10/22/02          
   
                                                      
  
  
Júnior Ahuja MD  
  
  
[address] [place] [address]  
  
  
[phone] (Work)  
  
  
[phone] (Fax) Referring       Internal Medicine 22           
   
                                                      
  
  
Júnior Ahuja MD  
  
  
[address] [place] [address]  
  
  
[phone] (Work)  
  
  
[phone] (Fax) Home Care Provider Internal Medicine 22           
   
                                                      
  
  
Brenda Hernandez,   
PT  
  
  
[address] [place] [address]  
  
  
[phone] (Work) Home Care  Acute Care 22           
   
                                                      
  
  
Maurizio Og PA-C  
  
  
[address] [place] [address]  
  
  
[phone] (Work)  
  
  
[phone] (Fax)                 Gastroenterology 23           
   
                                                      
  
  
Nathalia Bernal PA-C  
  
  
[address] [place] [address]  
  
  
[phone] (Work)  
  
  
[phone] (Fax)                 General Surgery 23           
  
  
  
                      Team Member Relationship Specialty  Start Date End Date  
   
                                                      
  
  
Júnior Ahuja MD  
  
  
[address] [place] [address]  
  
  
[phone] (Work)  
  
  
[phone] (Fax) PCP - General                   10/22/02          
   
                                                      
  
  
Júnior Ahuja MD  
  
  
[address] [place] [address]  
  
  
[phone] (Work)  
  
  
[phone] (Fax) Referring       Internal Medicine 22           
   
                                                      
  
  
Júnior Ahuja MD  
  
  
[address] [place] [address]  
  
  
[phone] (Work)  
  
  
[phone] (Fax) Home Care Provider Internal Medicine 22           
   
                                                      
  
  
Brenda Hernandez,   
PT  
  
  
[address] [place] [address]  
  
  
[phone] (Work) Home Care  Acute Care 22           
   
                                                      
  
  
Maurizio Og PA-C  
  
  
[address] [place] [address]  
  
  
[phone] (Work)  
  
  
[phone] (Fax)                 Gastroenterology 23           
   
                                                      
  
  
Nathalia Bernal PA-C  
  
  
[address] [place] [address]  
  
  
[phone] (Work)  
  
  
[phone] (Fax)                 General Surgery 23           
  
  
  
                      Team Member Relationship Specialty  Start Date End Date  
   
                                                      
  
  
Júnior Ahuja MD  
  
  
[address] [place] [address]  
  
  
[phone] (Work)  
  
  
[phone] (Fax) PCP - General                   10/22/02          
   
                                                      
  
  
Júnior Ahuja MD  
  
  
[address] [place] [address]  
  
  
[phone] (Work)  
  
  
[phone] (Fax) Referring       Internal Medicine 22           
   
                                                      
  
  
Júnior Ahuja MD  
  
  
[address] [place] [address]  
  
  
[phone] (Work)  
  
  
[phone] (Fax) Home Care Provider Internal Medicine 22           
   
                                                      
  
  
Brenda Hernandez,   
PT  
  
  
[address] [place] [address]  
  
  
[phone] (Work) Home Care  Acute Care 22           
   
                                                      
  
  
Maurizio Og PA-C  
  
  
[address] [place] [address]  
  
  
[phone] (Work)  
  
  
[phone] (Fax)                 Gastroenterology 23           
   
                                                      
  
  
Nathalia Bernal PA-C  
  
  
[address]  
  
  
[place] [address]  
  
  
[phone] (Work)  
  
  
[phone] (Fax)                 General Surgery 23           
  
  
  
                      Team Member Relationship Specialty  Start Date End Date  
   
                                                      
  
  
Júnior Ahuja MD  
  
  
[address] [place] [address]  
  
  
[phone] (Work)  
  
  
[phone] (Fax) PCP - General                   10/22/02          
   
                                                      
  
  
Júnior Ahuja MD  
  
  
[address] [place] [address]  
  
  
[phone] (Work)  
  
  
[phone] (Fax) Referring       Internal Medicine 22           
   
                                                      
  
  
Júnior Ahuja MD  
  
  
[address] [place] [address]  
  
  
[phone] (Work)  
  
  
[phone] (Fax) Home Care Provider Internal Medicine 22           
   
                                                      
  
  
Brenda Hernandez,   
PT  
  
  
[address] [place] [address]  
  
  
[phone] (Work) Home Care  Acute Care 22           
   
                                                      
  
  
Maurizio Og PA-C  
  
  
[address] [place] [address]  
  
  
[phone] (Work)  
  
  
[phone] (Fax)                 Gastroenterology 23           
   
                                                      
  
  
Nathalia Bernal PA-C  
  
  
[address]  
  
  
[place] [address]  
  
  
[phone] (Work)  
  
  
[phone] (Fax)                 General Surgery 23           
  
  
  
                      Team Member Relationship Specialty  Start Date End Date  
   
                                                      
  
  
Júnior Ahuja MD  
  
  
NPI: [id number]  
  
  
[address] [place] [address]  
  
  
[phone] (Work)  
  
  
[phone] (Fax) PCP - General                   10/22/02          
   
                                                      
  
  
Júnior Ahuja MD  
  
  
NPI: [id number]  
  
  
[address] [place] [address]  
  
  
[phone] (Work)  
  
  
[phone] (Fax) Referring       Internal Medicine 22           
   
                                                      
  
  
Júnior Ahuja MD  
  
  
NPI: [id number]  
  
  
[address] [place] [address]  
  
  
[phone] (Work)  
  
  
[phone] (Fax) Home Care Provider Internal Medicine 22           
   
                                                      
  
  
Brenda Hernandez,   
PT  
  
  
[address] [place], [place] [address] [phone] (Work) Home Care  Acute Care 22           
   
                                                      
  
  
Maurizio Og PA-C  
  
  
NPI: [id number]  
  
  
[address]  
  
  
[place] [address]  
  
  
[phone] (Work)  
  
  
[phone] (Fax)                 Gastroenterology 23           
   
                                                      
  
  
Nathalia Bernal PA-C  
  
  
NPI: [id number]  
  
  
[address] [facility] [place] [address]  
  
  
[phone] (Work)  
  
  
[phone] (Fax)                 General Surgery 23           
  
  
  
                      Team Member Relationship Specialty  Start Date End Date  
   
                                                      
  
  
Júnior Ahuja MD  
  
  
NPI: [id number]  
  
  
[address] [place] [address]  
  
  
[phone] (Work)  
  
  
[phone] (Fax) PCP - General                   10/22/02          
   
                                                      
  
  
Júnior Ahuja MD  
  
  
NPI: [id number]  
  
  
[address] [place] [address]  
  
  
[phone] (Work)  
  
  
[phone] (Fax) Referring       Internal Medicine 22           
   
                                                      
  
  
Júnior Ahuja MD  
  
  
NPI: [id number]  
  
  
[address] [place] [address]  
  
  
[phone] (Work)  
  
  
[phone] (Fax) Home Care Provider Internal Medicine 22           
   
                                                      
  
  
Brenda Hernandez,   
PT  
  
  
[address] [place], [place] [address] [phone] (Work) Home Care  Acute Care 22           
   
                                                      
  
  
Maurizio Og PA-C  
  
  
NPI: [id number]  
  
  
[address] [place] [address]  
  
  
[phone] (Work)  
  
  
[phone] (Fax)                 Gastroenterology 23           
   
                                                      
  
  
Nathalia Bernal PA-C  
  
  
NPI: [id number]  
  
  
[address]  
  
  
[place] [address]  
  
  
[phone] (Work)  
  
  
[phone] (Fax)                 General Surgery 23           
  
  
  
                      Team Member Relationship Specialty  Start Date End Date  
   
                                                      
  
  
Júnior Ahuja MD  
  
  
NPI: [id number]  
  
  
[address] [facility], [place] [address] [phone] (Work)  
  
  
[phone] (Fax) PCP - General                   10/22/02          
   
                                                      
  
  
Júnior Ahuja MD  
  
  
NPI: [id number]  
  
  
[address] [place] [address]  
  
  
[phone] (Work)  
  
  
[phone] (Fax) Referring       Internal Medicine 22           
   
                                                      
  
  
Júnior Ahuja MD  
  
  
NPI: [id number]  
  
  
[address] [place] [address]  
  
  
[phone] (Work)  
  
  
[phone] (Fax) Home Care Provider Internal Medicine 22           
   
                                                      
  
  
Brenda Hernandez,   
PT  
  
  
[address] [place] [address]  
  
  
[phone] (Work) Home Care  Acute Care 22           
   
                                                      
  
  
Maurizio Og PA-C  
  
  
NPI: [id number]  
  
  
[address] [place] [address]  
  
  
[phone] (Work)  
  
  
[phone] (Fax)                 Gastroenterology 23           
   
                                                      
  
  
Nathalia Bernal PA-C  
  
  
NPI: [id number]  
  
  
[address]  
  
  
[place] [address]  
  
  
[phone] (Work)  
  
  
[phone] (Fax)                 General Surgery 23           
  
  
  
                      Team Member Relationship Specialty  Start Date End Date  
   
                                                      
  
  
Júnior Ahuja MD  
  
  
NPI: [id number]  
  
  
[address] [place] [address]  
  
  
[phone] (Work)  
  
  
[phone] (Fax) PCP - General                   10/22/02          
   
                                                      
  
  
Júnior Ahuja MD  
  
  
NPI: [id number]  
  
  
[address] [facility]  
  
  
[place] [address]  
  
  
[phone] (Work)  
  
  
[phone] (Fax) Referring       Internal Medicine 22           
   
                                                      
  
  
Júnior Ahuja MD  
  
  
NPI: [id number]  
  
  
[address] [facility]  
  
  
[place] [address]  
  
  
[phone] (Work)  
  
  
[phone] (Fax) Home Care Provider Internal Medicine 22           
   
                                                      
  
  
Brenda Hernandez,   
PT  
  
  
[address] [facility]  
  
  
[place] [address]  
  
  
[phone] (Work) Home Care  Acute Care 22           
   
                                                      
  
  
Maurizio Og PA-C  
  
  
NPI: [id number]  
  
  
[address] [place] [address]  
  
  
[phone] (Work)  
  
  
[phone] (Fax)                 Gastroenterology 23           
   
                                                      
  
  
Nathalia Bernal PA-C  
  
  
NPI: [id number]  
  
  
[address] [facility]  
  
  
[place] [address]  
  
  
[phone] (Work)  
  
  
[phone] (Fax)                 General Surgery 23           
  
  
  
                      Team Member Relationship Specialty  Start Date End Date  
   
                                                      
  
  
Júnior Ahuja MD  
  
  
NPI: [id number]  
  
  
[address] [facility] [place] [place] [address]  
  
  
[phone] (Work)  
  
  
[phone] (Fax) PCP - General                   10/22/02          
   
                                                      
  
  
Júnior Ahuja MD  
  
  
NPI: [id number]  
  
  
[address] [facility] [place] [place] [address]  
  
  
[phone] (Work)  
  
  
[phone] (Fax) Referring       Internal Medicine 22           
   
                                                      
  
  
Júnior Ahuja MD  
  
  
NPI: [id number]  
  
  
[address] [facility] [place] [place] [address]  
  
  
[phone] (Work)  
  
  
[phone] (Fax) Home Care Provider Internal Medicine 22           
   
                                                      
  
  
Maurizio Og PA-C  
  
  
NPI: [id number]  
  
  
[address] [place] [address]  
  
  
[phone] (Work)  
  
  
[phone] (Fax)                 Gastroenterology 23           
   
                                                      
  
  
Nathalia Bernal PA-C  
  
  
NPI: [id number]  
  
  
[address]  
  
  
[place] [address]  
  
  
[phone] (Work)  
  
  
[phone] (Fax)                 General Surgery 23           
  
  
  
                      Team Member Relationship Specialty  Start Date End Date  
   
                                                      
  
  
Júnior Ahuja MD  
  
  
NPI: [id number]  
  
  
[address] [place] [address]  
  
  
[phone] (Work)  
  
  
[phone] (Fax) PCP - General                   10/22/02          
   
                                                      
  
  
Júnior Ahuja MD  
  
  
NPI: [id number]  
  
  
[address] [place] [address]  
  
  
[phone] (Work)  
  
  
[phone] (Fax) Referring       Internal Medicine 22           
   
                                                      
  
  
Júnior Ahuja MD  
  
  
NPI: [id number]  
  
  
[address] [facility]  
  
  
[place] [address]  
  
  
[phone] (Work)  
  
  
[phone] (Fax) Home Care Provider Internal Medicine 22           
   
                                                      
  
  
Maurizio Og PA-C  
  
  
NPI: [id number]  
  
  
[address] [place] [address]  
  
  
[phone] (Work)  
  
  
[phone] (Fax)                 Gastroenterology 23           
   
                                                      
  
  
Nathalia Bernal PA-C  
  
  
NPI: [id number]  
  
  
[address]  
  
  
[place] [address]  
  
  
[phone] (Work)  
  
  
[phone] (Fax)                 General Surgery 23           
  
  
  
                      Team Member Relationship Specialty  Start Date End Date  
   
                                                      
  
  
Júnior Ahuja MD  
  
  
NPI: [id number]  
  
  
[address] [facility]  
  
  
[place] [address]  
  
  
[phone] (Work)  
  
  
[phone] (Fax) PCP - General                   10/22/02          
   
                                                      
  
  
Júnior Ahuja MD  
  
  
NPI: [id number]  
  
  
[address] [place] [address]  
  
  
[phone] (Work)  
  
  
[phone] (Fax) Referring       Internal Medicine 22           
   
                                                      
  
  
Júnior Ahuja MD  
  
  
NPI: [id number]  
  
  
[address] [place] [address]  
  
  
[phone] (Work)  
  
  
[phone] (Fax) Home Care Provider Internal Medicine 22           
   
                                                      
  
  
Maurizio Og PA-C  
  
  
NPI: [id number]  
  
  
[address]  
  
  
[place] [address]  
  
  
[phone] (Work)  
  
  
[phone] (Fax)                 Gastroenterology 23           
   
                                                      
  
  
Nathalia Bernal PA-C  
  
  
NPI: [id number]  
  
  
[address]  
  
  
[place] [address]  
  
  
[phone] (Work)  
  
  
[phone] (Fax)                 General Surgery 23           
  
  
  
                      Team Member Relationship Specialty  Start Date End Date  
   
                                                      
  
  
Júnior Ahuja MD  
  
  
NPI: [id number]  
  
  
[address] [place] [address]  
  
  
[phone] (Work)  
  
  
[phone] (Fax) PCP - General                   10/22/02          
   
                                                      
  
  
Júnior Ahuja MD  
  
  
NPI: [id number]  
  
  
[address] [place] [address]  
  
  
[phone] (Work)  
  
  
[phone] (Fax) Referring       Internal Medicine 22           
   
                                                      
  
  
Júnior Ahuja MD  
  
  
NPI: [id number]  
  
  
[address] [place] [address]  
  
  
[phone] (Work)  
  
  
[phone] (Fax) Home Care Provider Internal Medicine 22           
   
                                                      
  
  
Maurizio Og PA-C  
  
  
NPI: [id number]  
  
  
[address] [place] [address]  
  
  
[phone] (Work)  
  
  
[phone] (Fax)                 Gastroenterology 23           
   
                                                      
  
  
Nathalia Bernal PA-C  
  
  
NPI: [id number]  
  
  
[address]  
  
  
[place] [address]  
  
  
[phone] (Work)  
  
  
[phone] (Fax)                 General Surgery 23           
  
  
  
                      Team Member Relationship Specialty  Start Date End Date  
   
                                                      
  
  
Júnior Ahuja MD  
  
  
NPI: [id number]  
  
  
[address] [place] [address]  
  
  
[phone] (Work)  
  
  
[phone] (Fax) PCP - General                   10/22/02          
   
                                                      
  
  
Júnior Ahuja MD  
  
  
NPI: [id number]  
  
  
[address] [place] [address]  
  
  
[phone] (Work)  
  
  
[phone] (Fax) Referring       Internal Medicine 22           
   
                                                      
  
  
Júnior Ahuja MD  
  
  
NPI: [id number]  
  
  
[address] [place] [address]  
  
  
[phone] (Work)  
  
  
[phone] (Fax) Home Care Provider Internal Medicine 22           
   
                                                      
  
  
Maurizio Og PA-C  
  
  
NPI: [id number]  
  
  
[address] [place], [place] [address] [phone] (Work)  
  
  
[phone] (Fax)                 Gastroenterology 23           
   
                                                      
  
  
Nathalia Bernal PA-C  
  
  
NPI: [id number]  
  
  
[address] [place] [address]  
  
  
[phone] (Work)  
  
  
[phone] (Fax)                 General Surgery 23           
  
  
  
                      Team Member Relationship Specialty  Start Date End Date  
   
                                                      
  
  
Júnior Ahuja MD  
  
  
NPI: [id number]  
  
  
[address] [place] [address]  
  
  
[phone] (Work)  
  
  
[phone] (Fax) PCP - General                   10/22/02          
   
                                                      
  
  
Júnior Ahuja MD  
  
  
NPI: [id number]  
  
  
[address] [place] [address]  
  
  
[phone] (Work)  
  
  
[phone] (Fax) Referring       Internal Medicine 22           
   
                                                      
  
  
Júnior Ahuja MD  
  
  
NPI: [id number]  
  
  
[address] [place] [address]  
  
  
[phone] (Work)  
  
  
[phone] (Fax) Home Care Provider Internal Medicine 22           
   
                                                      
  
  
Maurizio Og PA-C  
  
  
NPI: [id number]  
  
  
[address] [place] [address]  
  
  
[phone] (Work)  
  
  
[phone] (Fax)                 Gastroenterology 23           
   
                                                      
  
  
Nathalia Bernal PA-C  
  
  
NPI: [id number]  
  
  
[address]  
  
  
[place] [address]  
  
  
[phone] (Work)  
  
  
[phone] (Fax)                 General Surgery 23           
  
  
  
                      Team Member Relationship Specialty  Start Date End Date  
   
                                                      
  
  
Júnior Ahuja MD  
  
  
NPI: [id number]  
  
  
[address] [facility]  
  
  
[place] [address]  
  
  
[phone] (Work)  
  
  
[phone] (Fax) PCP - General                   10/22/02          
   
                                                      
  
  
Júnior Ahuja MD  
  
  
NPI: [id number]  
  
  
[address] [facility]  
  
  
[place] [address]  
  
  
[phone] (Work)  
  
  
[phone] (Fax) Referring       Internal Medicine 22           
   
                                                      
  
  
Júnior Ahuja MD  
  
  
NPI: [id number]  
  
  
[address] [facility]  
  
  
[place] [address]  
  
  
[phone] (Work)  
  
  
[phone] (Fax) Home Care Provider Internal Medicine 22           
   
                                                      
  
  
Maurizio Og PA-C  
  
  
NPI: [id number]  
  
  
[address] [place] [address]  
  
  
[phone] (Work)  
  
  
[phone] (Fax)                 Gastroenterology 23           
   
                                                      
  
  
Nathalia Bernal PA-C  
  
  
NPI: [id number]  
  
  
[address] [place] [address]  
  
  
[phone] (Work)  
  
  
[phone] (Fax)                 General Surgery 23           
  
  
  
                      Team Member Relationship Specialty  Start Date End Date  
   
                                                      
  
  
Júnior Ahuja MD  
  
  
NPI: [id number]  
  
  
[address] [facility]  
  
  
[place] [address]  
  
  
[phone] (Work)  
  
  
[phone] (Fax) PCP - General                   10/22/02          
   
                                                      
  
  
Júnior Ahuja MD  
  
  
NPI: [id number]  
  
  
[address] [facility] [place] [place] [address]  
  
  
[phone] (Work)  
  
  
[phone] (Fax) Referring       Internal Medicine 22           
   
                                                      
  
  
Júnior Ahuja MD  
  
  
NPI: [id number]  
  
  
[address] [facility]  
  
  
[place] [address]  
  
  
[phone] (Work)  
  
  
[phone] (Fax) Home Care Provider Internal Medicine 22           
   
                                                      
  
  
Maurizio Og PA-C  
  
  
NPI: [id number]  
  
  
[address]  
  
  
[place] [address]  
  
  
[phone] (Work)  
  
  
[phone] (Fax)                 Gastroenterology 23           
   
                                                      
  
  
Nathalia Bernal PA-C  
  
  
NPI: [id number]  
  
  
[address] [place] [address]  
  
  
[phone] (Work)  
  
  
[phone] (Fax)                 General Surgery 23           
  
  
  
                      Team Member Relationship Specialty  Start Date End Date  
   
                                                      
  
  
Júnior Ahuja MD  
  
  
NPI: [id number]  
  
  
[address] [place], [place] [address] [phone] (Work)  
  
  
[phone] (Fax) PCP - General                   10/22/02          
   
                                                      
  
  
Júnior Ahuja MD  
  
  
NPI: [id number]  
  
  
[address] [place] [address]  
  
  
[phone] (Work)  
  
  
[phone] (Fax) Referring       Internal Medicine 22           
   
                                                      
  
  
Júnior Ahuja MD  
  
  
NPI: [id number]  
  
  
[address] [place] [address]  
  
  
[phone] (Work)  
  
  
[phone] (Fax) Home Care Provider Internal Medicine 22           
   
                                                      
  
  
Brenda Hernandez,   
PT  
  
  
[address] [place] [address]  
  
  
[phone] (Work) Home Care  Acute Care 22  
  
  
  
                      Team Member Relationship Specialty  Start Date End Date  
   
                                                      
  
  
Júnior Ahuja MD  
  
  
NPI: [id number]  
  
  
[address] [place] [address]  
  
  
[phone] (Work)  
  
  
[phone] (Fax) PCP - General                   10/22/02          
   
                                                      
  
  
Júnior Ahuja MD  
  
  
NPI: [id number]  
  
  
[address] [place], [place] [address] [phone] (Work)  
  
  
[phone] (Fax) Referring       Internal Medicine 22           
   
                                                      
  
  
Júnior Ahuja MD  
  
  
NPI: [id number]  
  
  
[address] [place], [place] [address] [phone] (Work)  
  
  
[phone] (Fax) Home Care Provider Internal Medicine 22           
   
                                                      
  
  
Maurizio Og PA-C  
  
  
NPI: [id number]  
  
  
[address] [place] [address]  
  
  
[phone] (Work)  
  
  
[phone] (Fax)                 Gastroenterology 23           
   
                                                      
  
  
Nathalia Bernal PA-C  
  
  
NPI: [id number]  
  
  
[address]  
  
  
[place] [address]  
  
  
[phone] (Work)  
  
  
[phone] (Fax)                 General Surgery 23           
  
  
  
                      Team Member Relationship Specialty  Start Date End Date  
   
                                                      
  
  
Júnior Ahuja MD  
  
  
NPI: [id number]  
  
  
[address] [facility] [place] [address]  
  
  
[phone] (Work)  
  
  
[phone] (Fax) PCP - General                   10/22/02          
   
                                                      
  
  
Júnior Ahuja MD  
  
  
NPI: [id number]  
  
  
[address] [facility] [place] [address]  
  
  
[phone] (Work)  
  
  
[phone] (Fax) Referring       Internal Medicine 22           
   
                                                      
  
  
Júnior Ahuja MD  
  
  
NPI: [id number]  
  
  
[address] [facility] [place] [address]  
  
  
[phone] (Work)  
  
  
[phone] (Fax) Home Care Provider Internal Medicine 22           
   
                                                      
  
  
Maurizio Og PA-C  
  
  
NPI: [id number]  
  
  
[address] [place] [address]  
  
  
[phone] (Work)  
  
  
[phone] (Fax)                 Gastroenterology 23           
   
                                                      
  
  
Nathalia Bernal PA-C  
  
  
NPI: [id number]  
  
  
[address]  
  
  
[place] [address]  
  
  
[phone] (Work)  
  
  
[phone] (Fax)                 General Surgery 23           
  
  
  
                      Team Member Relationship Specialty  Start Date End Date  
   
                                                      
  
  
Júnior Ahuja MD  
  
  
NPI: [id number]  
  
  
[address]  
  
  
[place] [address]  
  
  
[phone] (Work)  
  
  
[phone] (Fax) PCP - General                   10/22/02          
   
                                                      
  
  
Júnior Ahuja MD  
  
  
NPI: [id number]  
  
  
[address] [place] [address]  
  
  
[phone] (Work)  
  
  
[phone] (Fax) Referring       Internal Medicine 22           
   
                                                      
  
  
Júnior Ahuja MD  
  
  
NPI: [id number]  
  
  
[address] [facility] [place] [place] [address]  
  
  
[phone] (Work)  
  
  
[phone] (Fax) Home Care Provider Internal Medicine 22           
   
                                                      
  
  
Maurizio Og PA-C  
  
  
NPI: [id number]  
  
  
[address]  
  
  
[place], [place] [id number] [phone] (Work)  
  
  
[phone] (Fax)                 Gastroenterology 23           
   
                                                      
  
  
Nathalia Bernal PA-C  
  
  
NPI: [id number]  
  
  
[address]  
  
  
[place] [address]  
  
  
[phone] (Work)  
  
  
[phone] (Fax)                 General Surgery 23           
  
  
  
                      Team Member Relationship Specialty  Start Date End Date  
   
                                                      
  
  
Júnior Ahuja MD  
  
  
NPI: [id number]  
  
  
[address] [place] [address]  
  
  
[phone] (Work)  
  
  
[phone] (Fax) PCP - General                   10/22/02          
   
                                                      
  
  
Júnior Ahuja MD  
  
  
NPI: [id number]  
  
  
[address] [place] [address]  
  
  
[phone] (Work)  
  
  
[phone] (Fax) Referring       Internal Medicine 22           
   
                                                      
  
  
Júnior Ahuja MD  
  
  
NPI: [id number]  
  
  
[address] [facility] [place] [place] [address]  
  
  
[phone] (Work)  
  
  
[phone] (Fax) Home Care Provider Internal Medicine 22           
   
                                                      
  
  
Maurizio Og PA-C  
  
  
NPI: [id number]  
  
  
[address]  
  
  
[place], [place] [address] [phone] (Work)  
  
  
[phone] (Fax)                 Gastroenterology 23           
   
                                                      
  
  
Nathalia Bernal PA-C  
  
  
NPI: [id number]  
  
  
[address]  
  
  
[place] [address]  
  
  
[phone] (Work)  
  
  
[phone] (Fax)                 General Surgery 23           
  
  
FOR RECORDS PERTAINING TO PATIENTS WHO ARE OR HAVE BEEN ENROLLED IN A CHEMICAL 
DEPENDENCY/SUBSTANCEABUSE PROGRAM, SOME INFORMATION MAY BE OMITTED. This 
clinical summary was aggregated from multiple sources. Caution should be 
exercised in using it in the provision of clinical care. This summary normalizes
 information from multiple sources, and as a consequence, information in this 
document may materially change the coding, format and clinical context of 
patient data. In addition, data may be omitted in some cases. CLINICAL DECISIONS
 SHOULD BE BASED ON THE PRIMARY CLINICAL RECORDS. [facility]. provides
 no warranty or guarantee of the accuracy or completeness of information in this
 document.

## 2024-02-07 NOTE — CT_ITS
REPORT-ID:[id number] 
 
STUDY:  CT BRAIN WITHOUT CONTRAST 
REASON FOR EXAM:   Male, 84 years old.  Altered mental status 
RADIATION DOSAGE (If Supplied By Facility):  CTDIvol = ( 44.99 ) mGy, DLP = 
( 846.73 ) mGycm 
TECHNIQUE:   Transaxial CT imaging of the brain was performed without 
administration of intravenous contrast material. 
Individualized dose optimization techniques were used for this CT. 
COMPARISON:   Comparison is made with prior study August 24, 2022. 
___________________________________ 
FINDINGS: 
 
Normal soft tissue structures.  Normal calvarium. 
 
There is mild cerebral atrophy with widening of the extra-axial spaces and 
ventricular dilatation.  There are areas of decreased attenuation within 
the white matter tracts of the supratentorial brain, consistent with 
microvascular disease changes.  Normal basal ganglia and thalami.  Normal 
brainstem.  There is mild cerebellar atrophy. 
 
There is no intracranial hemorrhage.  There are no findings of an acute 
ischemic infarction.  Atherosclerotic calcific plaques of the cavernous 
portions of the internal carotid arteries bilaterally. 
 
Normal visualized paranasal sinuses. 
___________________________________ 
 
ORDER #: [id number] CT/Brain/Head without Contrast  
IMPRESSION:  
Chronic involutional changes of the brain.  
 
  
Electronically Signed:  
Tay Mclain MD  
2024/02/07 at 15:43 EST  
Reading Location ID and State: [address] / [place]  
Tel [phone], Service support  [phone], Fax [phone]

## 2024-02-07 NOTE — ED.RN
attempted to call report to [place] at [phone] and [phone] about patient update regarding admission, no answers for both attempts.

## 2024-02-07 NOTE — CT_ITS
REPORT-ID:[id number] 
 
INDICATION: Neuro deficit, acute, stroke suspected 
 
EXAMINATION: CTA CAROTIDS AND BRAIN - CTA Head and Neck W/ Contrast 
Injection (and W/O Contrast Images if performed) 
 
TECHNIQUE: Routine CTA of the head and neck was performed with post 
processing of the angiographic images for volumetric reconstructions. In 
addition, images were obtained of the [place]. Nascet criteria 
using the distal ICAs for comparison were used for evaluation of stenoses. 
3D reconstructions were reviewed.  A radiation dose optimization technique 
was used for this scan. 
IV Contrast dosage and agent: 100 cc [id number]-370 
COMPARISON: Noncontrast head CT same date 
____________________________________________ 
 
FINDINGS: 
 
--NECK: 
AORTIC ARCH AND BRANCHES: Vessel origins patent. 
 
RIGHT CCA: No occlusion, significant stenosis or dissection. 
 
RIGHT ICA: No occlusion, significant stenosis or dissection. 
 
LEFT CCA: No occlusion, significant stenosis or dissection. 
 
LEFT ICA: No occlusion, significant stenosis or dissection. 
 
RIGHT VERTEBRAL ARTERY: No occlusion, significant stenosis or dissection. 
 
LEFT VERTEBRAL ARTERY: No occlusion, significant stenosis or dissection. 
 
NECK SOFT TISSUES: Unremarkable. 
 
LUNG APICES: Right upper lobe consolidation. 
 
BONES: Degenerative changes. 
 
--HEAD: 
--Anterior circulation: 
 
ICAs: No significant stenosis at the intracranial/visualized segments. 
 
ACAs:  No significant stenosis at the visualized segments. 
 
ACOM: Present. 
 
MCAs: No significant stenosis at the visualized segments. 
 
--Posterior circulation: 
 
PCOMs: Not seen. 
 
PCAs: No significant stenosis at the visualized segments. 
 
BASILAR ARTERY: No significant stenosis. 
 
VERTEBRAL ARTERIES: No significant stenosis at the intradural/visualized 
segments. 
 
No evidence of intracranial aneurysm or vascular malformation. 
 
ORDER #: [id number] CT/CTA Head AND Neck W/ Contrast  
IMPRESSION:  
 
  
No significant major vessel vaso-occlusive disease in the head or neck.  
 
  
Right upper lobe consolidation.  
 
  
Electronically Signed:  
Scott Doss MD  
2024/02/07 at 20:14 EST  
Reading Location ID and State: [address] / [place]  
Tel [phone], Service support  [phone], Fax [phone]

## 2024-02-08 VITALS
TEMPERATURE: 97.16 F | HEART RATE: 110 BPM | DIASTOLIC BLOOD PRESSURE: 59 MMHG | RESPIRATION RATE: 18 BRPM | SYSTOLIC BLOOD PRESSURE: 105 MMHG | OXYGEN SATURATION: 96 %

## 2024-02-08 VITALS
OXYGEN SATURATION: 97 % | SYSTOLIC BLOOD PRESSURE: 121 MMHG | RESPIRATION RATE: 17 BRPM | DIASTOLIC BLOOD PRESSURE: 78 MMHG | HEART RATE: 60 BPM | TEMPERATURE: 97.88 F

## 2024-02-08 VITALS
TEMPERATURE: 97.88 F | SYSTOLIC BLOOD PRESSURE: 100 MMHG | DIASTOLIC BLOOD PRESSURE: 69 MMHG | OXYGEN SATURATION: 97 % | RESPIRATION RATE: 17 BRPM | HEART RATE: 61 BPM

## 2024-02-08 VITALS
DIASTOLIC BLOOD PRESSURE: 80 MMHG | TEMPERATURE: 96.98 F | RESPIRATION RATE: 18 BRPM | HEART RATE: 124 BPM | OXYGEN SATURATION: 92 % | SYSTOLIC BLOOD PRESSURE: 107 MMHG

## 2024-02-08 VITALS — OXYGEN SATURATION: 90 % | RESPIRATION RATE: 12 BRPM | HEART RATE: 116 BPM

## 2024-02-08 VITALS
SYSTOLIC BLOOD PRESSURE: 92 MMHG | OXYGEN SATURATION: 94 % | TEMPERATURE: 97.34 F | HEART RATE: 124 BPM | RESPIRATION RATE: 16 BRPM | DIASTOLIC BLOOD PRESSURE: 76 MMHG

## 2024-02-08 VITALS
DIASTOLIC BLOOD PRESSURE: 87 MMHG | HEART RATE: 125 BPM | OXYGEN SATURATION: 97 % | TEMPERATURE: 97.16 F | RESPIRATION RATE: 16 BRPM | SYSTOLIC BLOOD PRESSURE: 98 MMHG

## 2024-02-08 VITALS — DIASTOLIC BLOOD PRESSURE: 59 MMHG | SYSTOLIC BLOOD PRESSURE: 105 MMHG | HEART RATE: 110 BPM

## 2024-02-08 VITALS — HEART RATE: 75 BPM | RESPIRATION RATE: 16 BRPM | OXYGEN SATURATION: 91 %

## 2024-02-08 VITALS
TEMPERATURE: 97.4 F | HEART RATE: 106 BPM | SYSTOLIC BLOOD PRESSURE: 88 MMHG | DIASTOLIC BLOOD PRESSURE: 75 MMHG | OXYGEN SATURATION: 98 % | RESPIRATION RATE: 16 BRPM

## 2024-02-08 VITALS — HEART RATE: 124 BPM | DIASTOLIC BLOOD PRESSURE: 80 MMHG | SYSTOLIC BLOOD PRESSURE: 107 MMHG

## 2024-02-08 VITALS — HEART RATE: 124 BPM | RESPIRATION RATE: 14 BRPM | OXYGEN SATURATION: 98 %

## 2024-02-08 LAB
ALANINE AMINOTRANSFER ALT/SGPT: 24 U/L (ref 16–61)
ALBUMIN SERPL-MCNC: 1.9 G/DL (ref 3.2–5)
ALKALINE PHOSPHATASE: 132 U/L (ref 45–117)
ANION GAP: 2 (ref 5–15)
ANISOCYTOSIS BLD QL SMEAR: (no result)
AST(SGOT): 42 U/L (ref 15–37)
BUN SERPL-MCNC: 35 MG/DL (ref 7–18)
BUN/CREAT RATIO: 35.9 RATIO (ref 10–20)
CALCIUM SERPL-MCNC: 12 MG/DL (ref 8.5–10.1)
CARBON DIOXIDE: 23 MMOL/L (ref 21–32)
CHLORIDE: 116 MMOL/L (ref 98–107)
CHOLEST SERPL-MCNC: 107 MG/DL
DEPRECATED RDW RBC: 65.6 FL (ref 35.1–43.9)
DIFFERENTIAL INDICATED: (no result)
ERYTHROCYTE [DISTWIDTH] IN BLOOD: 16.9 % (ref 11.6–14.6)
EST GLOM FILT RATE - AFR AMER: 94 ML/MIN (ref 60–?)
ESTIMATED CREATININE CLEARANCE: 70.95 ML/MIN
GLOBULIN: 3.7 G/DL (ref 2.2–4.2)
GLUCOSE: 92 MG/DL (ref 74–106)
HCT VFR BLD AUTO: 34.8 % (ref 40–54)
HGB BLD-MCNC: 10.9 G/DL (ref 13–16.5)
IMMATURE GRANULOCYTES COUNT: 0.02 X10^3/UL (ref 0–0)
IONIZED CALCIUM ORDER: (no result)
MAGNESIUM: 2.3 MG/DL (ref 1.6–2.6)
MANUAL DIF COMMENT BLD-IMP: (no result)
MCV RBC: 106.1 FL (ref 80–94)
MEAN CORP HGB CONC: 31.3 G/DL (ref 32–36)
MEAN PLATELET VOL.: 11.5 FL (ref 6.2–12)
MUCOUS THREADS URNS QL MICRO: (no result) /HPF
NRBC FLAGGED BY ANALYZER: 0 % (ref 0–5)
PLATELET # BLD: 50 K/MM3 (ref 150–450)
POSITIVE COUNT: YES
POSITIVE DIFFERENTIAL: YES
POSITIVE MORPHOLOGY: YES
POTASSIUM: 3.9 MMOL/L (ref 3.5–5.1)
PROCALCITONIN SERPL-MCNC: 0.17 NG/ML (ref 0–0.09)
RBC # BLD AUTO: 3.28 M/MM3 (ref 4.6–6.2)
RBC UR QL: (no result) /HPF (ref 0–5)
SCAN SMEAR PER REVIEW CRITERIA: (no result)
SP GR UR: 1.01 (ref 1–1.03)
SQUAMOUS URNS QL MICRO: (no result) /HPF (ref 0–5)
TRIGLYCERIDES: 79 MG/DL
URINE PRESERVATIVE: (no result)
VLDLC SERPL-MCNC: 16 MG/DL (ref 5–40)
WBC # BLD AUTO: 4.3 K/MM3 (ref 4.4–11)

## 2024-02-08 RX ADMIN — PIPERACILLIN SODIUM AND TAZOBACTAM SODIUM 12.5 GM: 3; .375 INJECTION, POWDER, LYOPHILIZED, FOR SOLUTION INTRAVENOUS at 09:49

## 2024-02-08 RX ADMIN — ALBUTEROL SULFATE 2.5 MG: 2.5 SOLUTION RESPIRATORY (INHALATION) at 07:27

## 2024-02-08 RX ADMIN — ALBUTEROL SULFATE 2.5 MG: 2.5 SOLUTION RESPIRATORY (INHALATION) at 14:40

## 2024-02-08 RX ADMIN — PIPERACILLIN SODIUM AND TAZOBACTAM SODIUM 12.5 GM: 3; .375 INJECTION, POWDER, LYOPHILIZED, FOR SOLUTION INTRAVENOUS at 20:51

## 2024-02-08 RX ADMIN — TOLTERODINE TARTRATE 4 MG: 4 CAPSULE, EXTENDED RELEASE ORAL at 09:50

## 2024-02-08 RX ADMIN — ALBUTEROL SULFATE 2.5 MG: 2.5 SOLUTION RESPIRATORY (INHALATION) at 19:30

## 2024-02-08 RX ADMIN — SODIUM CHLORIDE, PRESERVATIVE FREE 0 ML: 5 INJECTION INTRAVENOUS at 20:44

## 2024-02-08 RX ADMIN — VANCOMYCIN HYDROCHLORIDE 250 MG: 1 INJECTION, POWDER, LYOPHILIZED, FOR SOLUTION INTRAVENOUS at 09:17

## 2024-02-08 NOTE — PCM.RX.CS
Consult
Antibiotic Management
Pharmacy has been consulted to manage selected antibiotic: Vancomycin
Type of Intervention
Type of Consult: New start
Suspected Infection
Suspected Infection: Pneumonia
Labs
Labs: 


Sodium  141 mmol/L (136-145)   02/08/24  08:19    
Potassium  3.9 mmol/L (3.5-5.1)   02/08/24  08:19    
Chloride  116 mmol/L ()  H  02/08/24  08:19    
Carbon Dioxide  23.0 mmol/L (21.0-32.0)   02/08/24  08:19    
Anion Gap  2  (5-15)  L  02/08/24  08:19    
BUN  35 mg/dL (7-18)  H  02/08/24  08:19    
Creatinine  0.98 mg/dL (0.70-1.30)   02/08/24  08:19    
Est GFR (MDRD) Af Amer  94 mL/min (>60)   02/08/24  08:19    
Est GFR (MDRD) Non-Af  78 mL/min (>60)   02/08/24  08:19    
BUN/Creatinine Ratio  35.9 RATIO (10-20)  H  02/08/24  08:19    
Glucose  92 mg/dL ()   02/08/24  08:19    


Microbiology
Microbiology: 
Microbiology

02/07/24 15:15   Mucosa - Nose   SARS-CoV-2, Influenza & RSV (PCR) - Final


Goal Trough
Goal Trough: 15-20 mcg/mL
Pharmacy Plan for Drug Dosing
Pharmacy Plan for Drug Dosing: 

NEW START IV VANCOMYCIN
Consulting Physician: Dr. Spicer
Indication: Pneumonia r/o
Goal Trough: 15-20
SrCr: 0.98
CrCl: 58 mL/min (based on AdjBW)
Comments: Patient ordered a vancomycin 2000mg IV x1 loading dose administered 2/8 @0917
Vancomycin Dose: 1250mg IV Q12hr to start 2/8/24 @2100
Pending Level: 2/9/24 @2030, prior to 4th total dose per protocol

Pharmacy Service will continue to monitor and adjust dosing as required.

## 2024-02-08 NOTE — PCM.PN.HOSP
Reason for Visit
Reason for Visit: 
Diagnoses

Anemia in other chronic diseases classified elsewhere (02/07/24)
Obstructive sleep apnea (adult) (pediatric) (02/07/24)
Gastro-esophageal reflux disease without esophagitis (02/07/24)
Unspecified cirrhosis of liver (02/07/24)
Gout, unspecified (02/07/24)
Other symptoms and signs involving the nervous system (02/07/24)
Dependence on other enabling machines and devices (02/07/24)



Subjective
Subjective
Patient overnight with no acute events per self and per nursing report.  He did have negative MRI the day prior and upon evaluation/further imaging it was evident that he did have a consolidation therefore antibiotic therapy was initiated as well as 
further workup to evaluate pneumonia.  Patient was evaluated by telemetry neurology to be cautious and at this point they do believe that his presentation is likely infectious secondary to his new pneumonia however believe it could be related with 
his cirrhotic disease and possibly also hypercalcemia.  They recommended titrating bowel movements 2-3 daily.  Neurology also although lower suspicion planned EEG for the patient to be cautious.  Patient denies fevers, chills, nausea, emesis, 
abdominal pain, chest pain or dyspnea.

Objective Data
Objective Data
Vital Signs: 
Vital Signs

Temp Pulse Resp BP Pulse Ox O2 Del Method
 97.4 F L  106 H  16   88/75 L  98   CPAP 
 02/08/24 06:13  02/08/24 06:13  02/08/24 06:13  02/08/24 06:13  02/08/24 06:13  02/08/24 06:13



Oxygen Delivery Method         CPAP                                             
Weight:                        251 lb 5.231 oz                                  
Body Mass Index (BMI)          36.0                                             


Intake & Output: 
Intake and Output for Last 24 Hours

 02/06/24 02/07/24 02/08/24
 23:59 23:59 23:59
Intake Total  500 / 500 0 / 0
Output Total  0 / 0 200 / 200
Balance  500 / 500 -200 / -200


Lab / Micro Data

02/08/24 07:10          

02/08/24 08:19          

Labs: 
Laboratory Results - last 24 hr

02/07/24 15:15: WBC 4.9, RBC 3.26 L, Hgb 10.7 L, Hct 32.7 L, .3 H, MCH 32.8 H, MCHC 32.7, RDW Std Deviation 61.5 H, RDW Coeff of Ilia 16.9 H, Plt Count 58 L, MPV 11.9, Immature Gran % (Auto) 0.200, Neut % (Auto) 87.8 H, Lymph % (Auto) 5.1 L, 
Mono % (Auto) 5.9, Eos % (Auto) 0.8, Baso % (Auto) 0.2, Absolute Neuts (auto) 4.3, Absolute Lymphs (auto) 0.25 L, Nucleated RBC % 0, Sodium 140, Potassium 3.8, Chloride 110 H, Carbon Dioxide 29.0, Anion Gap 1 L, BUN 40 H, Creatinine 1.19, Estim 
Creat Clear Calc 57.96, Est GFR (MDRD) Af Amer 75, Est GFR (MDRD) Non-Af 62, BUN/Creatinine Ratio 33.6 H, Glucose 119 H, Calcium 12.5 H, Total Bilirubin 0.80, Direct Bilirubin 0.30, AST 36, ALT 29, Alkaline Phosphatase 156 H, Ammonia 34.0 H, 
Troponin I High Sens 24, B-Natriuretic Peptide 104.6 H, Total Protein 5.9 L, Albumin 2.3 L, Globulin 3.6, Albumin/Globulin Ratio 0.6 L


Micro: 
Microbiology

02/07/24 15:15   Mucosa - Nose   SARS-CoV-2, Influenza & RSV (PCR) - Final


Radiography
Diagnostic Testing: 
Radiology Impression

Chest X-Ray  02/07/24 15:22
IMPRESSION:
Blunting of the right costophrenic angle with bibasilar atelectasis and/or
infiltrates as well as increased markings in the right upper lobe abutting
the right minor fissure. Follow-up recommended.
 
Electronically Signed:
Tay Mclain MD
2024/02/07 at 15:35 EST
Reading Location ID and State: [address] / [place]
Tel [phone], Service support  [phone], Fax [phone]
 


Brain CT  02/07/24 15:25
IMPRESSION:
Chronic involutional changes of the brain.
 
Electronically Signed:
Tay Mclain MD
2024/02/07 at 15:43 EST
Reading Location ID and State: [address] / [place]
Tel [phone], Service support  [phone], Fax [phone]
 


Brain MRI  02/07/24 17:54
IMPRESSION:
Involutional changes of the brain, as described above.
 
Electronically Signed:
Marin Ma MD
2024/02/07 at 21:02 EST
Reading Location ID and State: [address] / [place]
Tel [phone], Service support  [phone], Fax [phone]
 


Head/Neck CTA  02/07/24 17:54
IMPRESSION:
 
No significant major vessel vaso-occlusive disease in the head or neck.
 
Right upper lobe consolidation.
 
Electronically Signed:
Scott Doss MD
2024/02/07 at 20:14 EST
Reading Location ID and State: [address] / [place]
Tel [phone], Service support  [phone], Fax [phone]
 




Physical Exam
Narrative
Physical Examination:
General: Awake, alert, oriented to self, place, recent events, remains cooperative, laying in the PCU bed, no acute distress.  He notes feeling improved since initial presentation.
Skin: Normal color, normal turgor, no icterus, no cyanosis except for occasional staged ecchymoses, stasis disease.
HEENT: AT/NC, EOMI, PERRLA, MMM.
Lungs: Diminished, diffusely, greater posterior, mildly decreased effort, no evidence of any distress, no rales, ronchi or wheezing.
Heart: Regular rate and rhythm; no gallop, rub audible.
Abdomen: Soft, obese, mild tension but unclear if this is patient's obesity or fluid, unable to ascertain distention given patient habitus.
Extremities: No cyanosis, no clubbing, bilateral lower extremity not markedly pitting ankle edema.
Neurological: Patient awake, alert, oriented as noted, cognitive function intact; pupils equally reactive to light and accommodation, cranial nerves grossly normal, moving all 4 extremities except significant left-sided hemiplegia left lower 
extremity, strength he notes mildly improved since initial presentation, remains moderately globally decreased.
Psychiatric: Affect appears fatigued otherwise normal, no acute evidence of depressive or anxiety feelings.

Assessment & Plan
Assessment/Plan
(1) Encephalopathy acute: 
PLAN: 
Plan
The patient is an 85 y/o M w/ PMHx: Chronic anemia/AOCD, CKD stage III unclear subtype, HTN, HLD, Hx CVA, Anxiety an Depression, Asthma, Valvular Heart Disease, PAF/Flutter, Nonobstructive CAD, CAREN on BIPAP, Non-alcoholic Liver Cirrhosis w/ Liver 
cancer w/ associated chronic thrombocytopenia/anemia/mild hyperammonemia, Obesity who presents [facility] the [facility] ED [facility]24 with history of worsening fatigue, malaise, debility and weakness with chronic left lower extremity debilities following previous 
stroke but worse with increased lethargy prompting family to bring him into the ED for evaluation with initial concern for possible stroke.

1.  Acute Encephalopathy, Adult FTT, multifactorial as noted also #2, #3, increased debility/weakness above baseline w/ Possible RUL Pneumonia/RUL Consolidation with possible gram-negative versus gram-positive organism versus Possible Metastatic 
Disease: Admission labs included CBC with WBC 4.9, N 110.7, .3, platelet 58 with lymphopenia, CMP with chloride 110, BUN/creatinine 40/1.19, glucose 119, calcium 12.5, hepatic profile with alk phos 156, ammonia 34, .6, troponin 24, 
chest x-ray with blunting right costophrenic angle with bibasilar atelectasis with increased markings right upper lobe abutting the right minor fissure of unclear significance, possibly atelectasis versus infiltrate, CT brain with chronic 
involutional changes, CTA head and neck with no significant major vaso-occlusive disease in the head or neck, evidence of a right upper lobe consolidation, MRI of the brain with chronic involutional changes with no acute intracranial findings. 
Admitted to PCU, given MRI without acute CVA suspect process in RUL likely associated with encephalopathy and increased weakness, will maintain on oxygen with wean as tolerated to room air, continue ATC budesonide, PRN albuterol, initiated given 
history of recent hospitalization within the 90-day leslee on IV Zosyn and Vancomycin however given negative MRSA screen will de-escalate off Zosyn therapy.  Encourage HOB, IS parameters w/ pending sputum cultures and urine antigens.  Procalcitonin 
0.17.  Bld cx x 2 obtained in the ED. PT/OT/ST/CM consulted for discharge planning. Given history will need repeat dedicated CT following this treatment to assure not in fact metastatic disease component.

2.  Increased left lower extremity weakness above baseline with encephalopathy, stroke ruled out, likely secondary to #1 as noted: Upon admission concern for initially recurrent stroke with baseline left-sided hemiplegia but worsened, CT brain with 
chronic involutional changes, CTA head and neck with no significant major vaso-occlusive disease in the head or neck, evidence of a right upper lobe consolidation, MRI of the brain with chronic involutional changes with no acute intracranial 
findings with stroke ruled out, allergy to statin with significant thrombocytopenia thus antiplatelet deferred and lieu suspicion for stroke upon admission, echo with bubble study has been requested, given no evidence of any stroke once appropriate 
will restart BP medication however currently patient is hypotensive or low normal, PT/OT/ST/CM consultations for discharge planning.  [facility] teleneurology consulted and at this point they do believe that his presentation is likely infectious secondary 
to his new pneumonia however believe it could be related with his cirrhotic disease and possibly also hypercalcemia. They recommended titrating bowel movements 2-3 daily.  Neurology also although lower suspicion planned EEG for the patient to be 
cautious. 

3.  Hypercalcemia: Likely contributing to his weakness and altered mental status most probably secondary to underlying malignancy, has been previously been higher, will judiciously hydrate given propensity for volume overload and trend CMP, ionized 
calcium pending.

4.  Nonalcoholic cirrhosis with underlying liver cancer with associated pancytopenia, chronic/chronic iron deficiency anemia: Patient following with Dr. Ortega, planned upcoming follow-up MRI of his liver this coming Friday per his report, continue 
chronic oral iron supplementation, will continue patient home metoprolol as BP allows, rifaximin. NH level 34; however, given constipation complaint noted to Neurology per patient will administer lactulose 20 mg BID regimen to titrate bowel 
movements, will continue to re-evaluate and alter as needed. Will also obtain abdominal US as may potentially also need paracentesis but with habitus difficult evaluation.

5.  PAF not chronically anticoagulated secondary to his underlying liver disease process with thrombocytopenia, maintained on diltiazem, encourage continued outpatient follow-up with cardiology as previously arranged.

6.  Valvular heart disease: Patient status post AV bioprosthetic valve, echo 7/23 with EF 60% with indeterminate diastolic function with severely dilated RV and global RV dysfunction, severe mitral annular calcification with RVSP 44 mmHg.

7.  Depression and anxiety: Will continue patient home sertraline regimen.

8.  GERD: We will continue patient on PPI.

9.  Gout: We will continue patient home allopurinol regimen.

10.  CAREN: BiPAP nightly.

11.  DVT prophylaxis: SCDs.  Holding chemoprophylaxis concept given significant thrombocytopenia.

12.  CODE STATUS: DNR-CCA, no intubation.

Charges/Coding
Visit Charges
Inpatient E&M: 93978 Subs Hosp L3

## 2024-02-08 NOTE — CASEMGMT
ELDA spoke with patient's daughter Dixie per her request. ELDA introduced self and role at [facility]. Dixie stated patient is at [facility] and she is worried they won't be able to take patient back. ELDA explained that ELDA will keep [facility] up to date on how 
patient is doing. Therapy notes will be sent to [facility] and they will notify us if they feel they cannot manage his care. If therapy recommends rehab for patient then it can be arranged for patient to go to another skilled nursing facility before he 
returns to [facility]. Dixie said she does not want him to go back to [facility] as she did not care for the doctor at [facility]. ELDA told Dixie we will provide her with a list of facilities to choose from and ELDA will check with facilities on 
availability. ELDA told Dixie SW will keep her updated. Dixie thanked ELDA for the help.

Jessica Gibbs MSW RODNEY

## 2024-02-08 NOTE — US_ITS
REPORT-ID:[id number] 
 
STUDY:  ABDOMINAL ULTRASOUND -LIMITED 4 QUADRANT 
 
REASON FOR VISIT:   Male, 84 years old  Ascites evaluation 
 
TECHNIQUE:   Ultrasound evaluation of the 4 quadrants was performed with 
real-time and static gray-scale imaging. 
 
TECHNICAL  QUALITY:   Adequate. 
 
COMPARISON:   None. 
___________________________________ 
 
FINDINGS: 
 
Ascites: There is severe ascites in the 4 quadrants of the abdomen. 
___________________________________ 
 
ORDER #: [id number] US/Abdomen Limited  
IMPRESSION:  
Ascites.  
 
  
Electronically Signed:  
Teodoro Casillas MD  
2024/02/08 at 22:57 EST  
Reading Location ID and State: 4[address] / [place]  
Tel [phone], Service support  [phone], Fax [phone]

## 2024-02-08 NOTE — NEURO.CONS
Assessment and Plan: Neuro
Assessment/Plan
NENA MADRIGAL is a 84 M with a past medical history of history of cirrhosis, liver cancer, GERD, pafib, anemia, gout CVA, being evaluated by Teleneurology for confusion. On history, pt appears to have become gradually more confused over the 
last several days and has had gradually distended abdomen though. Exam with severe delirium, non-focal motor exam. MRI Brain with no stroke. Ddx includes metabolic /infection encephalopathy (hypercalcemia, pneumonia), hepatic encephalopathy given 
lack of bowel movements, cannot rule out intermittent seizure but unlikely. 


Plan: 
- routine EEG
- management of pneumonia and hypercalcemia per primary team
- recommend titrating bowel movements with miralax or lactulose to 2-3x a day. 


I personally attended this patient and spent a total time of 45 minutes evaluating this patient including clinical assessment, review of chart, medical history imaging, and determining appropriate treatment and workup. 



HPI
Consult Data
Date of Consult: 02/08/24
HPI Narrative
HPI Narrative: 
NENA MADRIGAL, is a 84-year-old male history of cirrhosis, liver cancer, GERD, pafib, anemia, gout CVA presented to [facility] ED 2/7/2024 from assisted living for altered mental status.  Reportedly he has not been himself since 
Sunday and at that time had been having some difficulty talking and at times slurred speech.  No recent falls but daughter noted some contracture of right fingers on right hand but was able to open hand in ED without significant difficulty.  Has 
been having difficulty using his left leg however.  Family was concerned for stroke with patient due to his left leg weakness and not being able to talk for a period of time earlier.  CT brain in ED negative, ammonia 34, patiently mildly 
tachycardic.  Lab workup otherwise similar to patient's baseline/fairly benign.  Given there is still concern patient may have had recent stroke hospitalist contacted for admission for stroke rule out.  Patient evaluated at bedside with family 
present who provided most of the history.  Reportedly patient has been at [place] and had been doing fairly well and was a 1 assist and his daughter saw him on Thursday and he was not having any symptoms or changes in mental status, she saw them on 
Sunday and said he was slow to answer and thought his speech may be slightly slurred but he was evaluated and it did not seem that he had a stroke so plan was to proceed with him being transferred to [facility] living.  When he was evaluated 
there today he was not using his left leg at all and required multiple people to assist him which prompted patient coming to the ED.  Reportedly his left leg chronically does not work as well as his right but he usually is able to use it somewhat 
and this level of dysfunction is not common for him.  Also in ED complaining of some left-sided rib pain, was here in December with pneumonia and has had some cough residual since that time.  Additional concern that patient has his hands clenched 
and holds them like that.  Family at bedside report patient does seem slightly better but he is not back to baseline.

Neurologic History
Normally pt very talktaive and jovial. Has baseline difficulty with mobility and that is why he has been at rehab. Recently was told he needs to be transitioned to an ECF but had not moved recently. Was last seen by family at baseline last Thursday. 
Sunday evening he was having a lot of difficulty answering questions. Nothing clearly like this has happened before. Possible CRAO in the past but no confusion. Saw a neurologist several yrs ago for neuropathy and he was told that he may have had 
TIAs in the past. Sometimes he has had episodes when speech will be difficult and have difficulty with words - slurred speech - but not sustained confusion like now. He continues to have diminished movement of the L leg - some of that is at baseline 
but some is worse than normal. Histroy obtained from his daughter. Has never had hepatic encephalopathy in the past.

The cirrhosis is a new diagnosis. Tuesday night has had BM, his abdomen has become distended over the last several day. He has had issues with high calcium.



[facility]
Medical History (Updated 02/08/24 @ 07:34 by Dr. Marilin Spicer MD)

Anemia
Anemia in chronic kidney disease
Anemia of chronic disease
Aortic valve disorder
Asthma
Body mass index (BMI) 40.0-44.9, adult
Chronic acquired lymphedema
CKD (chronic kidney disease), stage III
Depression
Essential (primary) hypertension
GERD (gastroesophageal reflux disease)
Gout
History of cerebrovascular accident
History of splenomegaly
Hyperlipemia
Liver cancer
Morbid obesity
Multiple lung nodules
New onset atrial flutter
Non-alcoholic cirrhosis
Nonobstructive atherosclerosis of coronary artery
Nonrheumatic aortic (valve) stenosis
OAB (overactive bladder)
Obesity
Obesity (BMI 30-39.9)
CAREN treated with BiPAP
Osteoarthritis
Paroxysmal A-fib
PCK (polycystic kidney disease)
Peripheral neuropathy
Polycystic kidney disease
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
Right bundle branch block (RBBB)
Splenomegaly
Thrombocytopenia
Thrombocytopenia
Urge incontinence


Home Medications

allopurinol 300 mg tablet 300 mg PO DAILY gout 12/27/16 [History Last Taken 12/22/19]
fenofibrate nanocrystallized 48 mg tablet 48 mg PO DAILY cholesterol 12/27/16 [History Last Taken 12/22/19]
magnesium oxide 400 mg (241.3 mg magnesium) tablet 400 mg PO DAILY supplement 12/27/16 [History Last Taken 12/22/19]
multivit-min-folic acid 0.4 mg-lycopene 300 mcg-lutein 250 mcg tablet 1 ea PO DAILY vitamin 12/27/16 [History Last Taken 12/22/19]
sertraline 100 mg tablet 50 mg PO QHS depression 05/21/20 [History Last Taken Unknown]
aspirin 81 mg tablet,delayed release (Adult Aspirin Regimen) 81 mg PO DAILY 07/27/21 [History Last Taken Unknown]
ferrous sulfate 325 mg (65 mg iron) tablet 325 mg PO BID 07/27/21 [History Last Taken Unknown]
albuterol sulfate 90 mcg/actuation aerosol inhaler 2 puff inhalation Q6H PRN ASTHMA 02/08/22 [History Last Taken Unknown]
fluticasone propionate 50 mcg/actuation nasal spray,suspension 1 spray intranasal DAILY 02/08/22 [History Last Taken Unknown]
omeprazole 40 mg capsule,delayed release 40 mg PO DAILY 02/08/22 [History Last Taken Unknown]
oxybutynin chloride 15 mg tablet,extended release 24 hr 30 mg PO DAILY 02/08/22 [History Last Taken Unknown]
ascorbic acid (vitamin C) 500 mg capsule 500 mg PO DAILY 01/31/23 [History Last Taken Unknown]
furosemide 20 mg tablet 20 mg PO Q OTHER DAY 01/31/23 [History Last Taken Unknown]
nitroglycerin 0.4 mg sublingual tablet 0.4 mg sublingual Q5M PRN chest pain 01/31/23 [History Last Taken Unknown]
lidocaine 5 % topical patch (Lidoderm) 1 patch topical DAILY #15 ea 05/28/23 [Rx Last Taken Unknown]
cyanocobalamin (vitamin B-12) 5,000 mcg capsule 2,500 mcg PO DAILY 07/15/23 [History Last Taken Unknown]
rifaximin 550 mg tablet (Xifaxan) 550 mg PO BID 07/15/23 [History Last Taken Unknown]
diltiazem HCl 120 mg capsule,extended release 24 hr 120 mg PO DAILY #30 caps 08/23/23 [Rx Last Taken Unknown]
metoprolol tartrate 25 mg tablet 25 mg PO BID #60 tabs 08/23/23 [Rx Last Taken Unknown]
fluticasone furoate 200 mcg-vilanterol 25 mcg/dose inhalation powder (Breo Ellipta) 1 inh inhalation DAILY 12/21/23 [History Last Taken Unknown]
cefdinir 300 mg capsule 300 mg PO BID 4 days #8 caps 12/26/23 [Rx Last Taken Unknown]
ferrous sulfate 325 mg (65 mg iron) tablet,delayed release mg PO 02/07/24 [History Last Taken Unknown]




Allergy/AdvReac Type Severity Reaction Status Date / Time
hydrocodone [From Vicodin] Allergy  Other Verified 02/07/24 14:53
strawberry Allergy  Hives Verified 02/07/24 14:53
venom-honey bee Allergy  Anaphylaxis Verified 02/07/24 14:53
[bee venom (honey bee)]     
adhesive tape AdvReac  Rash Verified 02/07/24 14:53
atorvastatin AdvReac  PT UNSURE Verified 02/07/24 14:53
   OF REACTION  
montelukast AdvReac  PT UNSURE Verified 02/07/24 14:53
   OF REACTION  


Family History (Reviewed 02/07/24 @ 16:45 by Dr. Ritchie Fernando, )
Father
 CAD (coronary artery disease)
Brother
 Diabetes
Mother
 Heart disease


Surgical History (Reviewed 02/07/24 @ 16:45 by Dr. Ritchie Fernando, )

Aortic valve replaced
History of aortic valve replacement with bioprosthetic valve (10/04/11)
History of hip surgery (05/2020)
History of knee replacement
History of left heart catheterization (09/21/11)
History of tonsillectomy
History of total left hip arthroplasty
History of total left hip replacement
History of umbilical hernia repair


Social History (Reviewed 02/07/24 @ 16:45 by Dr. Ritchie Fernando, )
household members:  none 
Smoking Status:  Never smoker 
alcohol intake:  never 
substance use type:  does not use 



Vital Signs
Vital Signs
Vital Signs: 


 02/07/24
14:47 02/07/24
14:53 02/07/24
16:18
Temperature 96.6 F L  
Temperature Source Temporal  
Pulse Rate 126 H  128 H
Pulse Strength   
Respiratory Rate 13  16
Respiratory Effort  Normal 
Respiratory Depth   
Respiratory Pattern  Normal 
Blood Pressure 114/88 H  131/95 H
Blood Pressure Mean 96  107
Blood Pressure Source   
Blood Pressure Position   
Blood Pressure Location   
Pulse Ox 95  99
Oxygen Delivery Method Room Air  Room Air
Oxygen Flow Rate (L/min)   

 02/07/24
17:04 02/07/24
18:35 02/07/24
19:56
Temperature  97.7 F L 97.2 F L
Temperature Source  Oral Temporal
Pulse Rate 130 H 121 H 84
Pulse Strength   
Respiratory Rate 12 22 H 16
Respiratory Effort   
Respiratory Depth   
Respiratory Pattern   
Blood Pressure 142/109 H 131/96 H 122/101 H
Blood Pressure Mean 120 107 108
Blood Pressure Source  Monitor Monitor
Blood Pressure Position  Semi-Fowlers Semi-Fowlers
Blood Pressure Location  Right Arm Right Arm
Pulse Ox 96 95 94
Oxygen Delivery Method Room Air Room Air Room Air
Oxygen Flow Rate (L/min)   

 02/07/24
22:00 02/07/24
20:11 02/07/24
23:56
Temperature   97.2 F L
Temperature Source   Temporal
Pulse Rate   62
Pulse Strength Weak (1+)  
Respiratory Rate   16
Respiratory Effort  Normal
Non-Labored 
Respiratory Depth  Normal 
Respiratory Pattern  Normal 
Blood Pressure   102/88 H
Blood Pressure Mean   92
Blood Pressure Source   Monitor
Blood Pressure Position   Semi-Fowlers
Blood Pressure Location   Right Arm
Pulse Ox   93
Oxygen Delivery Method  Room Air CPAP
Oxygen Flow Rate (L/min)   

 02/07/24
21:35 02/08/24
03:13 02/08/24
03:44
Temperature  97.2 F L 
Temperature Source  Temporal 
Pulse Rate  125 H 
Pulse Strength   
Respiratory Rate  16 
Respiratory Effort   Normal
Non-Labored
Respiratory Depth   Normal
Respiratory Pattern   Normal
Blood Pressure  98/87 H 
Blood Pressure Mean  90 
Blood Pressure Source  Monitor 
Blood Pressure Position  Semi-Fowlers 
Blood Pressure Location  Right Arm 
Pulse Ox 93 97 
Oxygen Delivery Method Room Air CPAP CPAP
Oxygen Flow Rate (L/min)   

 02/08/24
05:29 02/08/24
06:13 02/08/24
08:21
Temperature 97.4 F L 97.4 F L 97 F L
Temperature Source Temporal Temporal Axillary
Pulse Rate 124 H 106 H 124 H
Pulse Strength   
Respiratory Rate 16 16 18
Respiratory Effort   
Respiratory Depth   
Respiratory Pattern   
Blood Pressure 92/76 88/75 L 107/80
Blood Pressure Mean 81 79 89
Blood Pressure Source Monitor Monitor Monitor
Blood Pressure Position Semi-Fowlers Semi-Fowlers Semi-Fowlers
Blood Pressure Location Left Arm Left Arm Left Forearm
Pulse Ox 94 98 92
Oxygen Delivery Method CPAP CPAP Room Air
Oxygen Flow Rate (L/min)   

 02/08/24
07:27 02/08/24
07:27 02/08/24
09:50
Temperature   
Temperature Source   
Pulse Rate 116 H  124 H
Pulse Strength   
Respiratory Rate 12  
Respiratory Effort   
Respiratory Depth   
Respiratory Pattern Normal  
Blood Pressure   107/80
Blood Pressure Mean   
Blood Pressure Source   
Blood Pressure Position   
Blood Pressure Location   
Pulse Ox  90 
Oxygen Delivery Method  Room Air 
Oxygen Flow Rate (L/min)   

 02/08/24
08:25 02/08/24
11:24
Temperature  97.8 F
Temperature Source  Temporal
Pulse Rate  60
Pulse Strength  
Respiratory Rate  17
Respiratory Effort Normal
Non-Labored 
Respiratory Depth Normal 
Respiratory Pattern Normal 
Blood Pressure  121/78 H
Blood Pressure Mean  92
Blood Pressure Source  Monitor
Blood Pressure Position  Semi-Fowlers
Blood Pressure Location  Right Arm
Pulse Ox  97
Oxygen Delivery Method Nasal Cannula Nasal Cannula
Oxygen Flow Rate (L/min) 2 2

Weight

Weight:                        114 kg                                            
Body Mass Index (BMI)          36.0                                              




NIHSS
NIHSS Nursing Documentation
NIHSS Nursing Documentation: 


NIH Stroke Scale                                      Start:  02/07/24 15:25
Freq:                                                 Status: Discharge     
Protocol:                                                                   
Activity Type Activity Date Activity User E-sign Co-sign Detail
Recorded Client Recorded Date Recorded By   
Document 02/07/24 15:25 [place]   
Desktop 02/07/24 15:26 [place]   

  02/07/24
  15:25
NIH Stroke Scale 
[NIHSS] 
A score of 0 is  normal  or asymptomatic 
. Total possible score is 42. 
 
Inpatient: RN or Physician to activate a 
stroke alert for onset of new stroke 
symptoms or with NIHSS increase >/= 3 
points. 
 
Following change in neurological status, 
NIHSS will be performed per physician 
order or more frequently PRN. 
 
 -1a. Level of Consciousness Alert; keenly
 responsive
 -1b. LOC Questions Answers BOTH
 questions
 correctly.
 -1c. LOC Commands Performs both
 tasks correctly
 .
 -2. Best Gaze Normal
 -3. Visual No visual loss
 -4. Facial Palsy Normal
 symmetrical
 movements
 -5a. Left Arm No drift; arm
 holds 90 (or 45
 ) degrees for
 full 10 seconds
 -5b. Right Arm No drift; arm
 holds 90 (or 45
 ) degrees for
 full 10 seconds
 -6a. Left Leg Drift; leg
 falls by the
 end of 5-
 seconds, but
 does not hit
 bed
 -6b. Right Leg No drift; leg
 holds 30-degree
 position for
 full 5 seconds
 -7. Limb Ataxia Absent
 -8. Sensory Normal; no
 sensory loss
 -9. Best Language No aphasia;
 normal
 -10. Dysarthria Normal
 -11. Extinction and Inattention No abnormality
 -Total 1
 Query Text:A score of 0 is  normal  or 
 asymptomatic. Total possible score is 42 
 . 
 ***ED: Notify Physician for NIHSS 
 increase by > / = 3 points. 
 ***Inpatient: RN or Physician to 
 activate a stroke alert for NIHSS 
 increase of > / = 3 points. 




Physical Exam
Narrative

-????????? General: Laying comfortably in bed; in no acute distress. 
-????????? HENT: Normal oropharynx and mucosa. Normal external appearance of ears and nose. Exophthalmos.
-????????? Neck: Supple, no pain or tenderness
-????????? CV:? No peripheral edema.
-????????? Pulmonary:? Normal respiratory effort.
-????????? Ext: No cyanosis, edema, or deformity
-????????? Skin: No rash. Normal palpation of skin.? 
-????????? Musculoskeletal: full range of motion; no joint tenderness. Normal digits and nails by inspection. No clubbing.

-????????? NEURO: 
-????????? Language: there is paucity of speech and pt intermittently follows commands but has poor attention.
-????????? Cranial Nerves: PERRL 3 mm/brisk. EOMI, visual fields full, no facial asymmetry
-????????? Motor: arms are antigravity b/l, LE move in the plane of the bed

Lab / Micro Data

02/08/24 07:10          

02/08/24 08:19          

Labs: 
Laboratory Results - last 24 hr

02/07/24 15:15: WBC 4.9, RBC 3.26 L, Hgb 10.7 L, Hct 32.7 L, .3 H, MCH 32.8 H, MCHC 32.7, RDW Std Deviation 61.5 H, RDW Coeff of Ilia 16.9 H, Plt Count 58 L, MPV 11.9, Immature Gran % (Auto) 0.200, Neut % (Auto) 87.8 H, Lymph % (Auto) 5.1 L, 
Mono % (Auto) 5.9, Eos % (Auto) 0.8, Baso % (Auto) 0.2, Absolute Neuts (auto) 4.3, Absolute Lymphs (auto) 0.25 L, Nucleated RBC % 0, Sodium 140, Potassium 3.8, Chloride 110 H, Carbon Dioxide 29.0, Anion Gap 1 L, BUN 40 H, Creatinine 1.19, Estim 
Creat Clear Calc 57.96, Est GFR (MDRD) Af Amer 75, Est GFR (MDRD) Non-Af 62, BUN/Creatinine Ratio 33.6 H, Glucose 119 H, Calcium 12.5 H, Total Bilirubin 0.80, Direct Bilirubin 0.30, AST 36, ALT 29, Alkaline Phosphatase 156 H, Ammonia 34.0 H, 
Troponin I High Sens 24, B-Natriuretic Peptide 104.6 H, Total Protein 5.9 L, Albumin 2.3 L, Globulin 3.6, Albumin/Globulin Ratio 0.6 L
02/08/24 07:10: WBC 4.3 L, RBC 3.28 L, Hgb 10.9 L, Hct 34.8 L, .1 H D, MCH 33.2 H, MCHC 31.3 L, RDW Std Deviation 65.6 H, RDW Coeff of Ilia 16.9 H, Plt Count 50 L*, MPV 11.5, Immature Gran % (Auto) 0.500, Neut % (Auto) 83.5 H, Lymph % (Auto) 
6.4 L, Mono % (Auto) 7.5, Eos % (Auto) 1.9, Baso % (Auto) 0.2, Absolute Neuts (auto) 3.6, Absolute Lymphs (auto) 0.27 L, Nucleated RBC % 0, Differential Comment COMMENT, Diff Path Review May foll, Platelet Estimate MOD DEC, Anisocytosis 1+, Sodium 
Cancelled, Potassium Cancelled, Chloride Cancelled, Carbon Dioxide Cancelled, Anion Gap Cancelled, BUN Cancelled, Creatinine Cancelled, Estim Creat Clear Calc Cancelled, Est GFR (MDRD) Af Amer Cancelled, Est GFR (MDRD) Non-Af Cancelled, 
BUN/Creatinine Ratio Cancelled, Glucose Cancelled, Calcium Cancelled, Magnesium Cancelled, Total Bilirubin Cancelled, AST Cancelled, ALT Cancelled, Alkaline Phosphatase Cancelled, Total Protein Cancelled, Albumin Cancelled, Globulin Cancelled, 
Albumin/Globulin Ratio Cancelled, Triglycerides Cancelled, Cholesterol Cancelled, LDL Cholesterol Cancelled, VLDL Cholesterol Cancelled, HDL Cholesterol Cancelled, Procalcitonin Cancelled, TSH Cancelled
02/08/24 08:19: Sodium 141, Potassium 3.9, Chloride 116 H, Carbon Dioxide 23.0, Anion Gap 2 L, BUN 35 H, Creatinine 0.98, Estim Creat Clear Calc 70.95, Est GFR (MDRD) Af Amer 94, Est GFR (MDRD) Non-Af 78, BUN/Creatinine Ratio 35.9 H, Glucose 92, 
Calcium 12.0 H, Magnesium 2.3, Total Bilirubin 0.80, AST 42 H, ALT 24, Alkaline Phosphatase 132 H, Total Protein 5.6 L, Albumin 1.9 L, Globulin 3.7, Albumin/Globulin Ratio 0.5 L, Triglycerides 79, Cholesterol 107, LDL Cholesterol 68, VLDL 
Cholesterol 16, HDL Cholesterol 23 L, Procalcitonin 0.17 H, TSH 3.28
02/08/24 08:20: MRSA (PCR) Negative
02/08/24 09:05: Ionized Calcium 6.38 H


Micro: 
Microbiology

02/07/24 15:15   Mucosa - Nose   SARS-CoV-2, Influenza & RSV (PCR) - Final


Imaging
Radiology Impression

Chest X-Ray  02/07/24 15:22
IMPRESSION:
Blunting of the right costophrenic angle with bibasilar atelectasis and/or
infiltrates as well as increased markings in the right upper lobe abutting
the right minor fissure. Follow-up recommended.
 
Electronically Signed:
Tay Mclain MD
2024/02/07 at 15:35 EST
Reading Location ID and State: 603 / [place]
Tel [phone], Service support  [phone], Fax [phone]
 


Brain CT  02/07/24 15:25
IMPRESSION:
Chronic involutional changes of the brain.
 
Electronically Signed:
Tay Mclain MD
2024/02/07 at 15:43 EST
Reading Location ID and State: [address] / [place]
Tel [phone], Service support  [phone], Fax [phone]
 


Brain MRI  02/07/24 17:54
IMPRESSION:
Involutional changes of the brain, as described above.
 
Electronically Signed:
Marin Ma MD
2024/02/07 at 21:02 EST
Reading Location ID and State: [address] / [place]
Tel [phone], Service support  [phone], Fax [phone]
 


Head/Neck CTA  02/07/24 17:54
IMPRESSION:
 
No significant major vessel vaso-occlusive disease in the head or neck.
 
Right upper lobe consolidation.
 
Electronically Signed:
Scott Doss MD
2024/02/07 at 20:14 EST
Reading Location ID and State: [address] / [place]
Tel [phone], Service support  [phone], Fax [phone]
 




Active Medications
Active Medications
Active Medications: 
Current Medications

Generic Name Dose Route Start Last Admin
  Trade Name Freq  PRN Reason Stop Dose Admin
Acetaminophen  650 mg  02/07/24 17:57 
  Acetaminophen 325 Mg Tablet  PO  
  Q6H PRN PRN  
  Pain 1-10 Or Fever >100.7  
Albuterol Sulfate  2.5 mg  02/07/24 17:57 
  Albuterol 2.5 Mg/3 Ml Vial.Neb.  INHALATION  
  Q2H PRN PRN  
  SOB/Wheezing  
Albuterol Sulfate  2.5 mg  02/07/24 20:45  02/08/24 07:27
  Albuterol 2.5 Mg/3 Ml Vial.Neb.  INHALATION   2.5 mg
  Q6HWA.RT SALO   Administration
Allopurinol  300 mg  02/08/24 10:00  02/08/24 09:50
  Allopurinol 300 Mg Tablet  PO   300 mg
  DAILY SALO   Administration
Budesonide  0.25 mg  02/07/24 20:45 
  Budesonide Aerosol 0.25 Mg/2 Ml Ampul.Neb  INHALATION  
  Q12H.RT SALO  
Diltiazem HCl  120 mg  02/08/24 10:00  02/08/24 09:50
  Diltiazem Cd 120 Mg Capsule  PO   120 mg
  DAILY SALO   Administration
  Protocol  
Ferrous Sulfate  325 mg  02/08/24 08:00  02/08/24 09:49
  Ferrous Sulfate 325 Mg Tablet  PO   325 mg
  BIDCM SALO   Administration
Hydralazine HCl  5 mg  02/07/24 17:57 
  Hydralazine 20 Mg/Ml Vial  IV  
  Q30M PRN  
  to maintain BP goals  
Sodium Chloride  250 mls @ 15 mls/hr  02/07/24 21:54 
  IV  
  .[id number] PRN  
  Additional IVPB Infusion  
Sodium Chloride  250 mls @ 15 mls/hr  02/07/24 21:54 
  IV  
  .[id number] PRN  
  Saline Flush  
Vancomycin IV-PHARMACY TO DOSE  500 mls @ 250 mls/hr  02/08/24 07:25 
  1 each/ Sodium Chloride  IV  
  PRN PRN  
  Rx to Dose  
  Protocol  
Piperacillin Sod/Tazobactam  50 mls @ 12.5 mls/hr  02/08/24 08:00  02/08/24 09:49
  Sod 3.375 gm/ Sodium Chloride  IV   12.5 mls/hr
  Q8 SALO   Administration
Vancomycin HCl 1,250 mg/  275 mls @ 167 mls/hr  02/08/24 21:00 
  Sodium Chloride  IV  
  Q12H SALO  
Labetalol HCl  10 - 20 mg  02/07/24 17:57 
  Labetalol (Prefilled) 20 Mg/4 Ml  IV  
  Q10M PRN PRN  
  to Maintain BP Goals  
Melatonin  3 mg  02/07/24 17:57 
  Melatonin 3 Mg Tablet  PO  
  QHS PRN PRN  
  INSOMNIA  
Metoprolol Tartrate  25 mg  02/07/24 22:00  02/08/24 09:50
  Metoprolol Tartrate 25 Mg Tablet  PO   25 mg
  BID SALO   Administration
  Protocol  
Ondansetron HCl  4 mg  02/07/24 17:57 
  Ondansetron 4 Mg/2 Ml Vial  IV  
  Q8H PRN PRN  
  NAUSEA/VOMITING  
Pantoprazole Sodium  40 mg  02/08/24 10:00  02/08/24 09:49
  Pantoprazole Sodium 40 Mg Tablet  PO   40 mg
  DAILY SALO   Administration
Rifaximin  550 mg  02/07/24 22:00  02/08/24 09:50
  Rifaximin 550 Mg Tablet  PO   550 mg
  BID SALO   Administration
Senna/Docusate Sodium  2 tablet  02/07/24 17:57 
  Senna/Docusate Sodium 1 Tablet  PO  
  BID PRN PRN  
  Constipation  
Sertraline HCl  50 mg  02/07/24 22:00  02/07/24 22:11
  Sertraline 50 Mg Tablet  PO   Not Given
  QHS SALO  
Sodium Chloride  10 - 40 ml  02/07/24 21:54  02/07/24 20:55
  0.9% Saline Lock 10 Ml Syringe  IV   10 ml
  UD PRN   Administration
  SALINE FLUSH  
Tolterodine Tartrate  4 mg  02/08/24 10:00  02/08/24 09:50
  Tolterodine Tartrate 4 Mg Cap.Sa  PO   4 mg
  DAILY SALO   Administration
Vancomycin Protocol  1 lab  02/09/24 18:30 
  Vancomycin Trough/Random Due    02/09/24 22:30 
  DAILY SALO

## 2024-02-08 NOTE — CON.PCM.NE_ITS
Assessment and Plan: Neuro    
Assessment/Plan    
NENA MADRIGAL is a 84 M with a past medical history of history of   
cirrhosis, liver cancer, GERD, pafib, anemia, gout CVA, being evaluated by   
Teleneurology for confusion. On history, pt appears to have become gradually   
more confused over the last several days and has had gradually distended abdomen  
though. Exam with severe delirium, non-focal motor exam. MRI Brain with no   
stroke. Ddx includes metabolic /infection encephalopathy (hypercalcemia,   
pneumonia), hepatic encephalopathy given lack of bowel movements, cannot rule   
out intermittent seizure but unlikely.     
    
    
Plan:     
- routine EEG    
- management of pneumonia and hypercalcemia per primary team    
- recommend titrating bowel movements with miralax or lactulose to 2-3x a day.     
    
    
I personally attended this patient and spent a total time of 45 minutes   
evaluating this patient including clinical assessment, review of chart, medical   
history imaging, and determining appropriate treatment and workup.     
    
    
    
HPI    
Consult Data    
Date of Consult: 02/08/24    
HPI Narrative    
HPI Narrative:     
NENA MADRIGAL, is a 84-year-old male history of cirrhosis, liver cancer, GERD,   
pafib, anemia, gout CVA presented to [facility] ED 2/7/2024 from  
assisted living for altered mental status.  Reportedly he has not been himself   
since Sunday and at that time had been having some difficulty talking and at   
times slurred speech.  No recent falls but daughter noted some contracture of   
right fingers on right hand but was able to open hand in ED without significant   
difficulty.  Has been having difficulty using his left leg however.  Family was   
concerned for stroke with patient due to his left leg weakness and not being   
able to talk for a period of time earlier.  CT brain in ED negative, ammonia 34,  
patiently mildly tachycardic.  Lab workup otherwise similar to patient's   
baseline/fairly benign.  Given there is still concern patient may have had   
recent stroke hospitalist contacted for admission for stroke rule out.  Patient   
evaluated at bedside with family present who provided most of the history.    
Reportedly patient has been at [facility] and had been doing fairly well and was a  
1 assist and his daughter saw him on Thursday and he was not having any symptoms  
or changes in mental status, she saw them on Sunday and said he was slow to   
answer and thought his speech may be slightly slurred but he was evaluated and   
it did not seem that he had a stroke so plan was to proceed with him being chase  
sferred to [place] assisted living.  When he was evaluated there today he was   
not using his left leg at all and required multiple people to assist him which   
prompted patient coming to the ED.  Reportedly his left leg chronically does not  
work as well as his right but he usually is able to use it somewhat and this   
level of dysfunction is not common for him.  Also in ED complaining of some   
left-sided rib pain, was here in December with pneumonia and has had some cough   
residual since that time.  Additional concern that patient has his hands   
clenched and holds them like that.  Family at bedside report patient does seem   
slightly better but he is not back to baseline.    
    
Neurologic History    
Normally pt very talktaive and jovial. Has baseline difficulty with mobility and  
that is why he has been at rehab. Recently was told he needs to be transitioned   
to an ECF but had not moved recently. Was last seen by family at baseline last   
Thursday. Sunday evening he was having a lot of difficulty answering questions.   
Nothing clearly like this has happened before. Possible CRAO in the past but no   
confusion. Saw a neurologist several yrs ago for neuropathy and he was told that  
he may have had TIAs in the past. Sometimes he has had episodes when speech will  
be difficult and have difficulty with words - slurred speech - but not sustained  
confusion like now. He continues to have diminished movement of the L leg - some  
of that is at baseline but some is worse than normal. Histroy obtained from his   
daughter. Has never had hepatic encephalopathy in the past.    
    
The cirrhosis is a new diagnosis. Tuesday night has had BM, his abdomen has   
become distended over the last several day. He has had issues with high calcium.    
    
    
    
[facility]    
Medical History (Updated 02/08/24 @ 07:34 by Dr. Marilin Spicer MD)    
    
Anemia    
Anemia in chronic kidney disease    
Anemia of chronic disease    
Aortic valve disorder    
Asthma    
Body mass index (BMI) 40.0-44.9, adult    
Chronic acquired lymphedema    
CKD (chronic kidney disease), stage III    
Depression    
Essential (primary) hypertension    
GERD (gastroesophageal reflux disease)    
Gout    
History of cerebrovascular accident    
History of splenomegaly    
Hyperlipemia    
Liver cancer    
Morbid obesity    
Multiple lung nodules    
New onset atrial flutter    
Non-alcoholic cirrhosis    
Nonobstructive atherosclerosis of coronary artery    
Nonrheumatic aortic (valve) stenosis    
OAB (overactive bladder)    
Obesity    
Obesity (BMI 30-39.9)    
CAREN treated with BiPAP    
Osteoarthritis    
Paroxysmal A-fib    
PCK (polycystic kidney disease)    
Peripheral neuropathy    
Polycystic kidney disease    
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    
Right bundle branch block (RBBB)    
Splenomegaly    
Thrombocytopenia    
Thrombocytopenia    
Urge incontinence    
    
    
                                Home Medications    
    
allopurinol 300 mg tablet 300 mg PO DAILY gout 12/27/16 [History Last Taken   
12/22/19]    
fenofibrate nanocrystallized 48 mg tablet 48 mg PO DAILY cholesterol 12/27/16   
[History Last Taken 12/22/19]    
magnesium oxide 400 mg (241.3 mg magnesium) tablet 400 mg PO DAILY supplement   
12/27/16 [History Last Taken 12/22/19]    
multivit-min-folic acid 0.4 mg-lycopene 300 mcg-lutein 250 mcg tablet 1 ea PO DA  
BOB vitamin 12/27/16 [History Last Taken 12/22/19]    
sertraline 100 mg tablet 50 mg PO QHS depression 05/21/20 [History Last Taken   
Unknown]    
aspirin 81 mg tablet,delayed release (Adult Aspirin Regimen) 81 mg PO DAILY   
07/27/21 [History Last Taken Unknown]    
ferrous sulfate 325 mg (65 mg iron) tablet 325 mg PO BID 07/27/21 [History Last   
Taken Unknown]    
albuterol sulfate 90 mcg/actuation aerosol inhaler 2 puff inhalation Q6H PRN   
ASTHMA 02/08/22 [History Last Taken Unknown]    
fluticasone propionate 50 mcg/actuation nasal spray,suspension 1 spray   
intranasal DAILY 02/08/22 [History Last Taken Unknown]    
omeprazole 40 mg capsule,delayed release 40 mg PO DAILY 02/08/22 [History Last   
Taken Unknown]    
oxybutynin chloride 15 mg tablet,extended release 24 hr 30 mg PO DAILY 02/08/22   
[History Last Taken Unknown]    
ascorbic acid (vitamin C) 500 mg capsule 500 mg PO DAILY 01/31/23 [History Last   
Taken Unknown]    
furosemide 20 mg tablet 20 mg PO Q OTHER DAY 01/31/23 [History Last Taken   
Unknown]    
nitroglycerin 0.4 mg sublingual tablet 0.4 mg sublingual Q5M PRN chest pain   
01/31/23 [History Last Taken Unknown]    
lidocaine 5 % topical patch (Lidoderm) 1 patch topical DAILY #15 ea 05/28/23 [Rx  
 Last Taken Unknown]    
cyanocobalamin (vitamin B-12) 5,000 mcg capsule 2,500 mcg PO DAILY 07/15/23   
[History Last Taken Unknown]    
rifaximin 550 mg tablet (Xifaxan) 550 mg PO BID 07/15/23 [History Last Taken   
Unknown]    
diltiazem HCl 120 mg capsule,extended release 24 hr 120 mg PO DAILY #30 caps   
08/23/23 [Rx Last Taken Unknown]    
metoprolol tartrate 25 mg tablet 25 mg PO BID #60 tabs 08/23/23 [Rx Last Taken   
Unknown]    
fluticasone furoate 200 mcg-vilanterol 25 mcg/dose inhalation powder (Breo   
Ellipta) 1 inh inhalation DAILY 12/21/23 [History Last Taken Unknown]    
cefdinir 300 mg capsule 300 mg PO BID 4 days #8 caps 12/26/23 [Rx Last Taken   
Unknown]    
ferrous sulfate 325 mg (65 mg iron) tablet,delayed release mg PO 02/07/24   
[History Last Taken Unknown]    
    
    
                                            
    
    
    
Allergy/AdvReac Type Severity Reaction Status Date / Time    
     
hydrocodone [From Vicodin] Allergy  Other Verified 02/07/24 14:53    
     
strawberry Allergy  Hives Verified 02/07/24 14:53    
     
venom-honey bee Allergy  Anaphylaxis Verified 02/07/24 14:53    
    
[bee venom (honey bee)]         
     
adhesive tape AdvReac  Rash Verified 02/07/24 14:53    
     
atorvastatin AdvReac  PT UNSURE Verified 02/07/24 14:53    
    
   OF REACTION      
     
montelukast AdvReac  PT UNSURE Verified 02/07/24 14:53    
    
   OF REACTION      
    
    
    
Family History (Reviewed 02/07/24 @ 16:45 by Dr. Ritchie Fernando, DO)    
Father   CAD (coronary artery disease)    
Brother   Diabetes    
Mother   Heart disease    
    
    
Surgical History (Reviewed 02/07/24 @ 16:45 by Dr. Ritchie Fernando, DO)    
    
Aortic valve replaced    
History of aortic valve replacement with bioprosthetic valve (10/04/11)    
History of hip surgery (05/2020)    
History of knee replacement    
History of left heart catheterization (09/21/11)    
History of tonsillectomy    
History of total left hip arthroplasty    
History of total left hip replacement    
History of umbilical hernia repair    
    
    
Social History (Reviewed 02/07/24 @ 16:45 by Dr. Ritchie Fernando, DO)    
household members:  none     
Smoking Status:  Never smoker     
alcohol intake:  never     
substance use type:  does not use     
    
    
    
Vital Signs    
Vital Signs    
Vital Signs:     
                                            
    
    
    
 02/07/24    
14:47 02/07/24    
14:53 02/07/24    
16:18    
     
Temperature 96.6 F L      
     
Temperature Source Temporal      
     
Pulse Rate 126 H  128 H    
     
Pulse Strength       
     
Respiratory Rate 13  16    
     
Respiratory Effort  Normal     
     
Respiratory Depth       
     
Respiratory Pattern  Normal     
     
Blood Pressure 114/88 H  131/95 H    
     
Blood Pressure Mean 96  107    
     
Blood Pressure Source       
     
Blood Pressure Position       
     
Blood Pressure Location       
     
Pulse Ox 95  99    
     
Oxygen Delivery Method Room Air  Room Air    
     
Oxygen Flow Rate (L/min)       
    
    
    
    
 02/07/24    
17:04 02/07/24    
18:35 02/07/24    
19:56    
     
Temperature  97.7 F L 97.2 F L    
     
Temperature Source  Oral Temporal    
     
Pulse Rate 130 H 121 H 84    
     
Pulse Strength       
     
Respiratory Rate 12 22 H 16    
     
Respiratory Effort       
     
Respiratory Depth       
     
Respiratory Pattern       
     
Blood Pressure 142/109 H 131/96 H 122/101 H    
     
Blood Pressure Mean 120 107 108    
     
Blood Pressure Source  Monitor Monitor    
     
Blood Pressure Position  Semi-Fowlers Semi-Fowlers    
     
Blood Pressure Location  Right Arm Right Arm    
     
Pulse Ox 96 95 94    
     
Oxygen Delivery Method Room Air Room Air Room Air    
     
Oxygen Flow Rate (L/min)       
    
    
    
    
 02/07/24    
22:00 02/07/24    
20:11 02/07/24    
23:56    
     
Temperature   97.2 F L    
     
Temperature Source   Temporal    
     
Pulse Rate   62    
     
Pulse Strength Weak (1+)      
     
Respiratory Rate   16    
     
Respiratory Effort  Normal    
Non-Labored     
     
Respiratory Depth  Normal     
     
Respiratory Pattern  Normal     
     
Blood Pressure   102/88 H    
     
Blood Pressure Mean   92    
     
Blood Pressure Source   Monitor    
     
Blood Pressure Position   Semi-Fowlers    
     
Blood Pressure Location   Right Arm    
     
Pulse Ox   93    
     
Oxygen Delivery Method  Room Air CPAP    
     
Oxygen Flow Rate (L/min)       
    
    
    
    
 02/07/24    
21:35 02/08/24    
03:13 02/08/24    
03:44    
     
Temperature  97.2 F L     
     
Temperature Source  Temporal     
     
Pulse Rate  125 H     
     
Pulse Strength       
     
Respiratory Rate  16     
     
Respiratory Effort   Normal    
Non-Labored    
     
Respiratory Depth   Normal    
     
Respiratory Pattern   Normal    
     
Blood Pressure  98/87 H     
     
Blood Pressure Mean  90     
     
Blood Pressure Source  Monitor     
     
Blood Pressure Position  Semi-Fowlers     
     
Blood Pressure Location  Right Arm     
     
Pulse Ox 93 97     
     
Oxygen Delivery Method Room Air CPAP CPAP    
     
Oxygen Flow Rate (L/min)       
    
    
    
    
 02/08/24    
05:29 02/08/24    
06:13 02/08/24    
08:21    
     
Temperature 97.4 F L 97.4 F L 97 F L    
     
Temperature Source Temporal Temporal Axillary    
     
Pulse Rate 124 H 106 H 124 H    
     
Pulse Strength       
     
Respiratory Rate 16 16 18    
     
Respiratory Effort       
     
Respiratory Depth       
     
Respiratory Pattern       
     
Blood Pressure 92/76 88/75 L 107/80    
     
Blood Pressure Mean 81 79 89    
     
Blood Pressure Source Monitor Monitor Monitor    
     
Blood Pressure Position Semi-Fowlers Semi-Fowlers Semi-Fowlers    
     
Blood Pressure Location Left Arm Left Arm Left Forearm    
     
Pulse Ox 94 98 92    
     
Oxygen Delivery Method CPAP CPAP Room Air    
     
Oxygen Flow Rate (L/min)       
    
    
    
    
 02/08/24    
07:27 02/08/24    
07:27 02/08/24    
09:50    
     
Temperature       
     
Temperature Source       
     
Pulse Rate 116 H  124 H    
     
Pulse Strength       
     
Respiratory Rate 12      
     
Respiratory Effort       
     
Respiratory Depth       
     
Respiratory Pattern Normal      
     
Blood Pressure   107/80    
     
Blood Pressure Mean       
     
Blood Pressure Source       
     
Blood Pressure Position       
     
Blood Pressure Location       
     
Pulse Ox  90     
     
Oxygen Delivery Method  Room Air     
     
Oxygen Flow Rate (L/min)       
    
    
    
    
 02/08/24    
08:25 02/08/24    
11:24    
     
Temperature  97.8 F    
     
Temperature Source  Temporal    
     
Pulse Rate  60    
     
Pulse Strength      
     
Respiratory Rate  17    
     
Respiratory Effort Normal    
Non-Labored     
     
Respiratory Depth Normal     
     
Respiratory Pattern Normal     
     
Blood Pressure  121/78 H    
     
Blood Pressure Mean  92    
     
Blood Pressure Source  Monitor    
     
Blood Pressure Position  Semi-Fowlers    
     
Blood Pressure Location  Right Arm    
     
Pulse Ox  97

## 2024-02-08 NOTE — PN.HOSP_ITS
Reason for Visit    
Reason for Visit:     
Diagnoses    
    
Anemia in other chronic diseases classified elsewhere (02/07/24)    
Obstructive sleep apnea (adult) (pediatric) (02/07/24)    
Gastro-esophageal reflux disease without esophagitis (02/07/24)    
Unspecified cirrhosis of liver (02/07/24)    
Gout, unspecified (02/07/24)    
Other symptoms and signs involving the nervous system (02/07/24)    
Dependence on other enabling machines and devices (02/07/24)    
    
    
    
Subjective    
Subjective    
Patient overnight with no acute events per self and per nursing report.  He did   
have negative MRI the day prior and upon evaluation/further imaging it was   
evident that he did have a consolidation therefore antibiotic therapy was   
initiated as well as further workup to evaluate pneumonia.  Patient was   
evaluated by telemetry neurology to be cautious and at this point they do   
believe that his presentation is likely infectious secondary to his new   
pneumonia however believe it could be related with his cirrhotic disease and   
possibly also hypercalcemia.  They recommended titrating bowel movements 2-3   
daily.  Neurology also although lower suspicion planned EEG for the patient to   
be cautious.  Patient denies fevers, chills, nausea, emesis, abdominal pain,   
chest pain or dyspnea.    
    
Objective Data    
Objective Data    
Vital Signs:     
Vital Signs    
    
    
    
Temp Pulse Resp BP Pulse Ox O2 Del Method    
     
 97.4 F L  106 H  16   88/75 L  98   CPAP     
     
 02/08/24 06:13  02/08/24 06:13  02/08/24 06:13  02/08/24 06:13  02/08/24 06:13   
02/08/24 06:13    
    
    
    
    
Oxygen Delivery Method           CPAP                                             
       
Weight:                          251 lb 5.231 oz                                  
       
Body Mass Index (BMI)            36.0                                             
       
    
    
Intake & Output:     
                       Intake and Output for Last 24 Hours    
    
    
    
 02/06/24 02/07/24 02/08/24    
    
 23:59 23:59 23:59    
     
Intake Total  500 / 500 0 / 0    
     
Output Total  0 / 0 200 / 200    
     
Balance  500 / 500 -200 / -200    
    
    
    
Lab / Micro Data    
    
                                                        02/08/24 07:10              
    
                                                        02/08/24 08:19              
    
Labs:     
                         Laboratory Results - last 24 hr    
    
02/07/24 15:15: WBC 4.9, RBC 3.26 L, Hgb 10.7 L, Hct 32.7 L, .3 H, MCH   
32.8 H, MCHC 32.7, RDW Std Deviation 61.5 H, RDW Coeff of Ilia 16.9 H, Plt Count   
58 L, MPV 11.9, Immature Gran % (Auto) 0.200, Neut % (Auto) 87.8 H, Lymph %   
(Auto) 5.1 L, Mono % (Auto) 5.9, Eos % (Auto) 0.8, Baso % (Auto) 0.2, Absolute   
Neuts (auto) 4.3, Absolute Lymphs (auto) 0.25 L, Nucleated RBC % 0, Sodium 140,   
Potassium 3.8, Chloride 110 H, Carbon Dioxide 29.0, Anion Gap 1 L, BUN 40 H,   
Creatinine 1.19, Estim Creat Clear Calc 57.96, Est GFR (MDRD) Af Amer 75, Est G  
FR (MDRD) Non-Af 62, BUN/Creatinine Ratio 33.6 H, Glucose 119 H, Calcium 12.5 H,  
Total Bilirubin 0.80, Direct Bilirubin 0.30, AST 36, ALT 29, Alkaline   
Phosphatase 156 H, Ammonia 34.0 H, Troponin I High Sens 24, B-Natriuretic   
Peptide 104.6 H, Total Protein 5.9 L, Albumin 2.3 L, Globulin 3.6, Albumin/Glob  
ulin Ratio 0.6 L    
    
    
Micro:     
                                  Microbiology    
    
02/07/24 15:15   Mucosa - Nose   SARS-CoV-2, Influenza & RSV (PCR) - Final    
    
    
Radiography    
Diagnostic Testing:     
                              Radiology Impression    
    
Chest X-Ray  02/07/24 15:22    
IMPRESSION:    
Blunting of the right costophrenic angle with bibasilar atelectasis and/or    
infiltrates as well as increased markings in the right upper lobe abutting    
the right minor fissure. Follow-up recommended.    
     
Electronically Signed:    
Tay Mclain MD    
2024/02/07 at 15:35 EST    
Reading Location ID and State: [address] / [place]    
Tel [phone], Service support  [phone], Fax [phone]    
     
    
    
Brain CT  02/07/24 15:25    
IMPRESSION:    
Chronic involutional changes of the brain.    
     
Electronically Signed:    
Tay Mclain MD    
2024/02/07 at 15:43 EST    
[place] Location ID and State: [address] / [place]    
Tel [phone], Service support  [phone], Fax [phone]    
     
    
    
Brain MRI  02/07/24 17:54    
IMPRESSION:    
Involutional changes of the brain, as described above.    
     
Electronically Signed:    
Marin Ma MD    
2024/02/07 at 21:02 EST    
[place] Location ID and State: [address] / [place]    
Tel [phone], Service support  [phone], Fax [phone]    
     
    
    
Head/Neck CTA  02/07/24 17:54    
IMPRESSION:    
     
No significant major vessel vaso-occlusive disease in the head or neck.    
     
Right upper lobe consolidation.    
     
Electronically Signed:    
Scott Doss MD    
2024/02/07 at 20:14 EST    
[place] Location ID and State: [address] / [place]    
Tel [phone], Service support  [phone], Fax [phone]    
     
    
    
    
    
Physical Exam    
Narrative    
Physical Examination:    
General: Awake, alert, oriented to self, place, recent events, remains   
cooperative, laying in the PCU bed, no acute distress.  He notes feeling   
improved since initial presentation.    
Skin: Normal color, normal turgor, no icterus, no cyanosis except for occasional  
staged ecchymoses, stasis disease.    
HEENT: AT/NC, EOMI, PERRLA, MMM.    
Lungs: Diminished, diffusely, greater posterior, mildly decreased effort, no   
evidence of any distress, no rales, ronchi or wheezing.    
Heart: Regular rate and rhythm; no gallop, rub audible.    
Abdomen: Soft, obese, mild tension but unclear if this is patient's obesity or   
fluid, unable to ascertain distention given patient habitus.    
Extremities: No cyanosis, no clubbing, bilateral lower extremity not markedly   
pitting ankle edema.    
Neurological: Patient awake, alert, oriented as noted, cognitive function   
intact; pupils equally reactive to light and accommodation, cranial nerves   
grossly normal, moving all 4 extremities except significant left-sided   
hemiplegia left lower extremity, strength he notes mildly improved since initial  
presentation, remains moderately globally decreased.    
Psychiatric: Affect appears fatigued otherwise normal, no acute evidence of   
depressive or anxiety feelings.    
    
Assessment & Plan    
Assessment/Plan    
(1) Encephalopathy acute:     
PLAN:     
Plan    
The patient is an 85 y/o M w/ PMHx: Chronic anemia/AOCD, CKD stage III unclear   
subtype, HTN, HLD, Hx CVA, Anxiety an Depression, Asthma, Valvular Heart   
Disease, PAF/Flutter, Nonobstructive CAD, CAREN on BIPAP, Non-alcoholic Liver   
Cirrhosis w/ Liver cancer w/ associated chronic thrombocytopenia/anemia/mild   
hyperammonemia, Obesity who presents to the [facility] ED on 2/7/24 with history of   
worsening fatigue, malaise, debility and weakness with chronic left lower   
extremity debilities following previous stroke but worse with increased lethargy  
prompting family to bring him into the ED for evaluation with initial concern   
for possible stroke.    
    
1.  Acute Encephalopathy, Adult FTT, multifactorial as noted also #2, #3,   
increased debility/weakness above baseline w/ Possible RUL Pneumonia/RUL   
Consolidation with possible gram-negative versus gram-positive organism versus   
Possible Metastatic Disease: Admission labs included CBC with WBC 4.9, N 110.7,   
.3, platelet 58 with lymphopenia, CMP with chloride 110, BUN/creatinine   
40/1.19, glucose 119, calcium 12.5, hepatic profile with alk phos 156, ammonia   
34, .6, troponin 24, chest x-ray with blunting right costophrenic angle   
with bibasilar atelectasis with increased markings right upper lobe abutting the  
right minor fissure of unclear significance, possibly atelectasis versus   
infiltrate, CT brain with chronic involutional changes, CTA head and neck with   
no significant major vaso-occlusive disease in the head or neck, evidence of a   
right upper lobe consolidation, MRI of the brain with chronic involutional   
changes with no acute intracranial findings. Admitted to PCU, given MRI without   
acute CVA suspect process in RUL likely associated with encephalopathy and   
increased weakness, will maintain on oxygen with wean as tolerated to room air,   
continue ATC budesonide, PRN albuterol, initiated given history of recent   
hospitalization within the 90-day leslee on IV Zosyn and Vancomycin however given   
negative MRSA screen will de-escalate off Zosyn therapy.  Encourage HOB, IS   
parameters w/ pending sputum cultures and urine antigens.  Procalcitonin 0.17.    
Bld cx x 2 obtained in the ED. PT/OT/ST/CM consulted for discharge planning.   
Given history will need repeat dedicated CT following this treatment to assure   
not in fact metastatic disease component.    
    
2.  Increased left lower extremity weakness above baseline with encephalopathy,   
stroke ruled out, likely secondary to #1 as noted: Upon admission concern for   
initially recurrent stroke with baseline left-sided hemiplegia but worsened, CT   
brain with chronic involutional changes, CTA head and neck with no significant   
major vaso-occlusive disease in the head or neck, evidence of a right upper lobe  
consolidation, MRI of the brain with chronic involutional changes with no acute   
intracranial findings with stroke ruled out, allergy to statin with significant   
thrombocytopenia thus antiplatelet deferred and lieu suspicion for stroke upon   
admission, echo with bubble study has been requested, given no evidence of any   
stroke once appropriate will restart BP medication however currently patient is   
hypotensive or low normal, PT/OT/ST/CM consultations for discharge planning.    
[facility] teleneurology consulted and at this point they do believe that his   
presentation is likely infectious secondary to his new pneumonia however believe  
it could be related with his cirrhotic disease and possibly also hypercalcemia.   
They recommended titrating bowel movements 2-3 daily.  Neurology also although   
lower suspicion planned EEG for the patient to be cautious.     
    
3.  Hypercalcemia: Likely contributing to his weakness and altered mental status  
most probably secondary to underlying malignancy, has been previously been   
higher, will judiciously hydrate given propensity for volume overload and trend   
CMP, ionized calcium pending.    
    
4.  Nonalcoholic cirrhosis with underlying liver cancer with associated   
pancytopenia, chronic/chronic iron deficiency anemia: Patient following with Dr. Ortega, planned upcoming follow-up MRI of his liver this coming Friday per his   
report, continue chronic oral iron supplementation, will continue patient home   
metoprolol as BP allows, rifaximin. NH level 34; however, given constipation   
complaint noted to Neurology per patient will administer lactulose 20 mg BID   
regimen to titrate bowel movements, will continue to re-evaluate and alter as   
needed. Will also obtain abdominal US as may potentially also need paracentesis   
but with habitus difficult evaluation.    
    
5.  PAF not chronically anticoagulated secondary to his underlying liver disease  
process with thrombocytopenia, maintained on diltiazem, encourage continued   
outpatient follow-up with cardiology as previously arranged.    
    
6.  Valvular heart disease: Patient status post AV bioprosthetic valve, echo   
7/23 with EF 60% with indeterminate diastolic function with severely dilated RV   
and global RV dysfunction, severe mitral annular calcification with RVSP 44   
mmHg.    
    
7.  Depression and anxiety: Will continue patient home sertraline regimen.    
    
8.  GERD: We will continue patient on PPI.    
    
9.  Gout: We will continue patient home allopurinol regimen.    
    
10.  CAREN: BiPAP nightly.    
    
11.  DVT prophylaxis: SCDs.  Holding chemoprophylaxis concept given significant   
thrombocytopenia.    
    
12.  CODE STATUS: DNR-CCA, no intubation.    
    
Charges/Coding    
Visit Charges    
Inpatient E&M: 07161 Subs Hosp L3

## 2024-02-09 VITALS
SYSTOLIC BLOOD PRESSURE: 102 MMHG | RESPIRATION RATE: 17 BRPM | TEMPERATURE: 97.88 F | DIASTOLIC BLOOD PRESSURE: 75 MMHG | OXYGEN SATURATION: 93 % | HEART RATE: 126 BPM

## 2024-02-09 VITALS
DIASTOLIC BLOOD PRESSURE: 65 MMHG | HEART RATE: 65 BPM | TEMPERATURE: 97.34 F | RESPIRATION RATE: 18 BRPM | OXYGEN SATURATION: 98 % | SYSTOLIC BLOOD PRESSURE: 93 MMHG

## 2024-02-09 VITALS
RESPIRATION RATE: 16 BRPM | DIASTOLIC BLOOD PRESSURE: 62 MMHG | OXYGEN SATURATION: 100 % | SYSTOLIC BLOOD PRESSURE: 92 MMHG | HEART RATE: 71 BPM

## 2024-02-09 VITALS — HEART RATE: 122 BPM

## 2024-02-09 VITALS
OXYGEN SATURATION: 99 % | DIASTOLIC BLOOD PRESSURE: 49 MMHG | SYSTOLIC BLOOD PRESSURE: 73 MMHG | RESPIRATION RATE: 16 BRPM | HEART RATE: 68 BPM

## 2024-02-09 VITALS
RESPIRATION RATE: 18 BRPM | DIASTOLIC BLOOD PRESSURE: 60 MMHG | HEART RATE: 88 BPM | TEMPERATURE: 97.5 F | SYSTOLIC BLOOD PRESSURE: 91 MMHG | OXYGEN SATURATION: 100 %

## 2024-02-09 VITALS
HEART RATE: 109 BPM | SYSTOLIC BLOOD PRESSURE: 92 MMHG | RESPIRATION RATE: 16 BRPM | OXYGEN SATURATION: 99 % | DIASTOLIC BLOOD PRESSURE: 56 MMHG

## 2024-02-09 VITALS
TEMPERATURE: 97.7 F | HEART RATE: 120 BPM | DIASTOLIC BLOOD PRESSURE: 65 MMHG | OXYGEN SATURATION: 94 % | SYSTOLIC BLOOD PRESSURE: 92 MMHG | RESPIRATION RATE: 16 BRPM

## 2024-02-09 VITALS
DIASTOLIC BLOOD PRESSURE: 74 MMHG | OXYGEN SATURATION: 94 % | HEART RATE: 116 BPM | SYSTOLIC BLOOD PRESSURE: 104 MMHG | TEMPERATURE: 97.7 F | RESPIRATION RATE: 16 BRPM

## 2024-02-09 VITALS
HEART RATE: 93 BPM | SYSTOLIC BLOOD PRESSURE: 95 MMHG | RESPIRATION RATE: 16 BRPM | OXYGEN SATURATION: 98 % | DIASTOLIC BLOOD PRESSURE: 58 MMHG | TEMPERATURE: 97.16 F

## 2024-02-09 VITALS
RESPIRATION RATE: 16 BRPM | SYSTOLIC BLOOD PRESSURE: 83 MMHG | OXYGEN SATURATION: 99 % | DIASTOLIC BLOOD PRESSURE: 52 MMHG | HEART RATE: 85 BPM

## 2024-02-09 VITALS
OXYGEN SATURATION: 96 % | DIASTOLIC BLOOD PRESSURE: 74 MMHG | RESPIRATION RATE: 18 BRPM | HEART RATE: 103 BPM | TEMPERATURE: 97.5 F | SYSTOLIC BLOOD PRESSURE: 94 MMHG

## 2024-02-09 VITALS
HEART RATE: 64 BPM | OXYGEN SATURATION: 98 % | DIASTOLIC BLOOD PRESSURE: 53 MMHG | SYSTOLIC BLOOD PRESSURE: 80 MMHG | RESPIRATION RATE: 16 BRPM

## 2024-02-09 VITALS — RESPIRATION RATE: 22 BRPM | HEART RATE: 108 BPM

## 2024-02-09 VITALS
TEMPERATURE: 97.34 F | HEART RATE: 124 BPM | SYSTOLIC BLOOD PRESSURE: 78 MMHG | OXYGEN SATURATION: 100 % | RESPIRATION RATE: 18 BRPM | DIASTOLIC BLOOD PRESSURE: 65 MMHG

## 2024-02-09 VITALS
SYSTOLIC BLOOD PRESSURE: 102 MMHG | OXYGEN SATURATION: 93 % | RESPIRATION RATE: 17 BRPM | TEMPERATURE: 97.8 F | DIASTOLIC BLOOD PRESSURE: 70 MMHG | HEART RATE: 122 BPM

## 2024-02-09 VITALS
DIASTOLIC BLOOD PRESSURE: 47 MMHG | RESPIRATION RATE: 16 BRPM | OXYGEN SATURATION: 99 % | SYSTOLIC BLOOD PRESSURE: 69 MMHG | HEART RATE: 64 BPM

## 2024-02-09 VITALS
TEMPERATURE: 97.9 F | DIASTOLIC BLOOD PRESSURE: 67 MMHG | SYSTOLIC BLOOD PRESSURE: 84 MMHG | HEART RATE: 95 BPM | RESPIRATION RATE: 18 BRPM | OXYGEN SATURATION: 96 %

## 2024-02-09 VITALS
RESPIRATION RATE: 16 BRPM | SYSTOLIC BLOOD PRESSURE: 71 MMHG | DIASTOLIC BLOOD PRESSURE: 48 MMHG | OXYGEN SATURATION: 98 % | HEART RATE: 61 BPM

## 2024-02-09 VITALS
HEART RATE: 121 BPM | RESPIRATION RATE: 16 BRPM | DIASTOLIC BLOOD PRESSURE: 65 MMHG | OXYGEN SATURATION: 98 % | SYSTOLIC BLOOD PRESSURE: 91 MMHG

## 2024-02-09 VITALS — OXYGEN SATURATION: 98 %

## 2024-02-09 LAB
ALANINE AMINOTRANSFER ALT/SGPT: 29 U/L (ref 16–61)
ALBUMIN SERPL-MCNC: 2.1 G/DL (ref 3.2–5)
ALKALINE PHOSPHATASE: 187 U/L (ref 45–117)
ANION GAP: 3 (ref 5–15)
APPEARANCE FLD: CLEAR
APPEARANCE FLD: YELLOW
APTT PPP: 33.1 SECONDS (ref 24.1–36.2)
AST(SGOT): 34 U/L (ref 15–37)
AUTO B FLUID ANALYZER BKGD CT: (no result)
BF MICROSCOPIC 2ND SPECIMEN: (no result)
BODY FLUID MONONUCLEAR WBC #: 0.03 10^3/UL
BODY FLUID POLYNUCLEAR WBC %: 45.5 %
BODY FLUID QC TYPE(S): (no result)
BUN SERPL-MCNC: 33 MG/DL (ref 7–18)
BUN/CREAT RATIO: 30.8 RATIO (ref 10–20)
CALCIUM SERPL-MCNC: 11.6 MG/DL (ref 8.5–10.1)
CARBON DIOXIDE: 27 MMOL/L (ref 21–32)
CHLORIDE: 114 MMOL/L (ref 98–107)
DEPRECATED RDW RBC: 62.3 FL (ref 35.1–43.9)
DIFFERENTIAL INDICATED: (no result)
ERYTHROCYTE [DISTWIDTH] IN BLOOD: 16.8 % (ref 11.6–14.6)
EST GLOM FILT RATE - AFR AMER: 85 ML/MIN (ref 60–?)
ESTIMATED CREATININE CLEARANCE: 64.98 ML/MIN
GLOBULIN: 3.1 G/DL (ref 2.2–4.2)
GLUCOSE, BODY FLUID: 91 MG/DL (ref 40–70)
GLUCOSE: 76 MG/DL (ref 74–106)
HCT VFR BLD AUTO: 32.9 % (ref 40–54)
HGB BLD-MCNC: 10.8 G/DL (ref 13–16.5)
IMMATURE GRANULOCYTES COUNT: 0.02 X10^3/UL (ref 0–0)
MCV RBC: 101.2 FL (ref 80–94)
MEAN CORP HGB CONC: 32.8 G/DL (ref 32–36)
MEAN PLATELET VOL.: 11.3 FL (ref 6.2–12)
MONONUC CELLS NFR FLD: 54.5 %
NEUTS SEG # FLD: 38 %
NRBC FLAGGED BY ANALYZER: 0 % (ref 0–5)
PLATELET # BLD: 58 K/MM3 (ref 150–450)
POSITIVE COUNT: YES
POSITIVE DIFFERENTIAL: YES
POTASSIUM: 3.7 MMOL/L (ref 3.5–5.1)
PROTHROMBIN TIME (PROTIME)PT.: 15.2 SECONDS (ref 11.7–14.9)
PTHIN: 109.1 PG/ML (ref 18.4–80.1)
RBC # BLD AUTO: 3.25 M/MM3 (ref 4.6–6.2)
RBC # FLD: 585 /MM3
SPECIMEN SOURCE FLD: (no result)
VITAMIN D,25 HYDROXY: 51.8 NG/ML
WBC # BLD AUTO: 5.6 K/MM3 (ref 4.4–11)
WBC # FLD: 0.03 10^3/UL
WBC # FLD: 0.06 10^3/UL

## 2024-02-09 RX ADMIN — PIPERACILLIN SODIUM AND TAZOBACTAM SODIUM 12.5 GM: 3; .375 INJECTION, POWDER, LYOPHILIZED, FOR SOLUTION INTRAVENOUS at 15:53

## 2024-02-09 RX ADMIN — TOLTERODINE TARTRATE 4 MG: 4 CAPSULE, EXTENDED RELEASE ORAL at 10:17

## 2024-02-09 RX ADMIN — SODIUM CHLORIDE, PRESERVATIVE FREE 0 ML: 5 INJECTION INTRAVENOUS at 18:09

## 2024-02-09 RX ADMIN — ALBUMIN (HUMAN) 60 GM: 0.25 INJECTION, SOLUTION INTRAVENOUS at 13:54

## 2024-02-09 RX ADMIN — LIDOCAINE HYDROCHLORIDE 0 ML: 20 INJECTION, SOLUTION INFILTRATION; PERINEURAL at 12:05

## 2024-02-09 RX ADMIN — PIPERACILLIN SODIUM AND TAZOBACTAM SODIUM 12.5 GM: 3; .375 INJECTION, POWDER, LYOPHILIZED, FOR SOLUTION INTRAVENOUS at 20:07

## 2024-02-09 RX ADMIN — ALBUTEROL SULFATE 2.5 MG: 2.5 SOLUTION RESPIRATORY (INHALATION) at 07:10

## 2024-02-09 RX ADMIN — PIPERACILLIN SODIUM AND TAZOBACTAM SODIUM 12.5 GM: 3; .375 INJECTION, POWDER, LYOPHILIZED, FOR SOLUTION INTRAVENOUS at 05:59

## 2024-02-09 RX ADMIN — ALBUMIN (HUMAN) 60 GM: 0.25 INJECTION, SOLUTION INTRAVENOUS at 15:50

## 2024-02-09 RX ADMIN — SODIUM CHLORIDE, PRESERVATIVE FREE 0 ML: 5 INJECTION INTRAVENOUS at 11:16

## 2024-02-09 RX ADMIN — SODIUM CHLORIDE 15 ML: 9 INJECTION, SOLUTION INTRAVENOUS at 10:16

## 2024-02-09 RX ADMIN — PAMIDRONATE DISODIUM 250 MG: 9 INJECTION INTRAVENOUS at 18:09

## 2024-02-09 NOTE — ST.MBS
Modified Barium Swallow
Patient Information
Study Date: 24
Study Time: 14:50
Direct Billable Minutes: 120
Total Minutes procedure & reportin
Diagnosis: Acute encephalopathy G93.40, GERD K21.9
Referring Physician: Marilin Spicer
Reason for Referral: 
Objectively assess swallow function, assess risk for aspiration, and determine recommendations for least restrictive diet textures and compensatory strategies to improve safety of swallow.
Medical History: 
PMH: Anemia, Aortic valve disorder, Asthma, Chronic acquired lymphedema, CKD (chronic kidney disease), Depression, Essential (primary) hypertension, GERD (gastroesophageal reflux disease), History of cerebrovascular accident, Hyperlipemia, Liver 
cancer, Obesity, Multiple lung nodules, New onset atrial flutter, Non-alcoholic cirrhosis, Nonobstructive atherosclerosis of coronary artery, Nonrheumatic aortic (valve) stenosis, OAB (overactive bladder), CAREN treated with BiPAP, Osteoarthritis, 
Paroxysmal A-fib, PCK (polycystic kidney disease), Peripheral neuropathy, Right bundle branch block (RBBB), Thrombocytopenia. 

The patient presented to [facility] ED on 2024 from his longterm with altered mental status. Patient's daughter reported that he patient was experiencing slurred speech, difficulty speaking, and was not using his left leg. Initially, there was concern for 
acute CVA, but work up was negative. He has been admitted for management of acute encephalopathy. Of note, the patient was admitted to [facility] in December with pneumonia and has had a residual cough since then. ST consult was placed for difficulty 
swallowing. He was made NPO by RN due to difficulty swallowing medications. BSE was completed 24 recommending Easy to Chew textures / Thin liquids with plan for MBSS to further assess concern for aspiration, especially when consuming thin 
liquids.
Current Diet Ordered: Easy to Chew textures / Thin liquids
Dentition: Natural Teeth and Missing Teeth
Mental Status: Impaired (Difficulty following commands throughout the evaluation, current encephalopathy)
Respiratory Status: Oxygenating on 2L/M nasal cannula
Penetration-Aspiration Scale
Penetration-Aspiration Scale: 
OBJECTIVE ASSESSMENT OF SWALLOW FUNCTION (QUANTITATIVE ? PER TRIAL): 
     PENETRATION / ASPIRATION SCALE (KEY):
 1 = does not enter airway
 2 = enters airway/above vocal folds/ejected
 3 = enters airway/above vocal folds/not ejected
 4 = enters airway/contacts vocal folds/ejected
 5 = enters airway/contacts vocal folds/not ejected
 6 = enters airway/below vocal folds/ejected
 7 = enters airway/below vocal folds/not ejected despite effort
 8 = enters airway/below vocal folds/no effort
 
     VIDEOFLOROSCOPIC SCALE SCORE (KEY): 
 Grade I = aspiration of material that has penetrated into the laryngeal vestibule, intact cough reflex
 Grade II = aspiration < 10 % of the bolus, intact cough reflex
 Grade III = aspiration of < 10 % of the bolus, reduced cough reflex or
      aspiration of > 10 % of the bolus, intact cough reflex
 Grade IV = aspiration of > 10 % of the bolus, reduced cough reflex

Penetration-Aspiration Scale Score
Thin Liquid via teaspoon: 
      Result: 1= does not enter airway
Thin Liquid via teaspoon Trial 2: 
      Result: 7= enters airways/below vocal folds/not ejected despite effort
Thin Liquid via small single sip: cup: 
      Result: 5= enters airways/contacts vocal folds/not ejected
Nectar Thick Liquid via small single sip: cup: 
      Result: 2= enter airway/above vocal folds/ejected
      Comment: 
Cued cough and re-swallow with max cues to clear contrast from the vocal folds from thin by cup trial.
Nectar Thick Liquid via small single sip: cup Trial 2: 
      Result: 1= does not enter airway
Pudding via teaspoon: 
      Result: 1= does not enter airway
      Comment: 
Esophageal screen - Retention with pudding in the lower esophagus with min retrograde flow. NTL wash via tsp did not clear retention in the lower esophagus.
1/4 Cookie: 
      Result: 1= does not enter airway
      Comment: 
SLP initially provided 1/2 cookie with no initiation of mastication.
Thin Liquid via single sip: straw: 
      Result: 7= enters airways/below vocal folds/not ejected despite effort
      Comment: 
Delayed swallow
Nectar Thick Liquid via small single sip: cup Trial 3: 
      Result: 1= does not enter airway
      Comment: 
Max cues to double swallow; however, the patient was unable to complete double swallow.
Oral Phase
Labial Seal: Escape beyond mid-chin
Tongue Control During Bolus Hold: Posterior escape of greater than half of bolus
Bolus Preparation/Mastication: Disorganized chewing/mashing with solid pieces of bolus unchewed
Bolus Transport/Lingual Motion: Slowed tongue motion
Oral Residue: Residue collection on oral structures
Pharyngeal Phase
Initiation of Pharyngeal Swallow: Bolus head in pyriforms (thin by tsp spilled to his VF prior to swallow onset)
Soft Palate Elevation: No bolus between soft palate and pharyngeal wall
Laryngeal Elevation: Partial superior movement thyroid cart/partial apprx aryt-epig petiole
Anterior Hyoid Excursion: Partial anterior movement
Epiglottic Movement: Complete inversion
Laryngeal Vestibule Closure at Height of Swallow: Incomplete; narrow column of air/contrast in laryngeal vestibule
Pharyngeal Stripping Wave: Present - diminished
Pharyngoesophageal Segment Opening: Complete distension and complete duration; no obstruction of flow
Tongue Base Retraction: Wide column of contrast between tongue base & post. pharyngeal wall
Pharyngeal Residue: Collection of residue within or on pharyngeal structures
Esophageal Phase
Esophageal Clearance: Esophageal retention w/ retrograde flow below pharyngoesophageal seg.
Diagnosis/Impression
Diagnosis: Moderate oropharyngeal dysphagia R13.12
Impression: 
The oral phase is primarily marked by...
-Decreased bolus control with >1/2 of the bolus spilling posteriorly to the pyriforms prior to swallow onset observed with liquids especially.
-Slowed tongue motion for A-P transport.
-Mild oral residue after the swallow with large sips, which mostly cleared with independent initiation of a second swallow as needed.
-Prolonged, disorganized mastication of cookie trial.

The pharyngeal phase is primarily marked by...
-Decreased airway closure during the swallow due to decreased anterior hyoid excursion and decreased laryngeal elevation.
-Mildly-moderately decreased tongue base retraction and mildly decreased pharyngeal stripping wave with resulting in mild-moderate pharyngeal residues after the swallow.
-Aspiration during the swallow of thin liquids via tsp and straw.
Recommendations
Diet: Mechanical Soft Textures (Soft and bite size textures IDDSI Level 6) and Nectar-thick Liquids
Compensatory Strategies: Small Bites, Small Sips, Slow Rate, Alternate bites/solids and sips/liquids, Sitting upright and Remain sitting upright for 30 minutes after PO intake
Supervision: 1:1 Close Supervision (Feeding Assistance)
Recommend Repeat Modified Barium Swallow: TBD
Need for Skilled Speech Therapy Services: Yes
Comment: 
-Train the patient in use of strategies to decrease risk for aspiration and reflux aspiration.
-Ongoing assessment of diet tolerance of recommended textures. Patient is ok for trials of regular and thin textures at bedside with SLP if deemed clinically appropriate to consider diet advancement.
-Once able to follow commands, consider training the patient in oropharyngeal exercise program.
Recommended Referrals: GI Consult (No immediate GI consult recommended; however, retention of barium in lower esophagus. If concern for increased s/s of reflux or regurgitation, please consider inpatient GI consult.)
Education Completed: 1. Described result of evaluation. and 4. Family/caregivers understand evaluation & agree w/ goals & tx plan.
Comment: 
SLP called the patient's daughter and Dixie HODGES, and educated her in the results and recommendations of the MBSS. Patient's daughter agreeable to recommendations. Additionally, SLP educated RN, Ade, in results and recommendations of the study and 
provided handouts for the recommended diet and strategies to be posted in the patient's room.
Status
Active ST Patient: Active
Contact Information
[facility] Speech Therapy:: 
Sanaz Lee M.A. CCC-SLP?
Speech-Language Pathologist??
[place] [address]??
[place] [address]??
[email or web address]??
[phone]???

## 2024-02-09 NOTE — SP.MBSS_ITS
Modified Barium Swallow    
Patient Information    
Study Date: 24    
Study Time: 14:50    
Direct Billable Minutes: 120    
Total Minutes procedure & reportin    
Diagnosis: Acute encephalopathy G93.40, GERD K21.9    
Referring Physician: Marilin Spicer    
Reason for Referral:     
Objectively assess swallow function, assess risk for aspiration, and determine   
recommendations for least restrictive diet textures and compensatory strategies   
to improve safety of swallow.    
Medical History:     
PMH: Anemia, Aortic valve disorder, Asthma, Chronic acquired lymphedema, CKD   
(chronic kidney disease), Depression, Essential (primary) hypertension, GERD   
(gastroesophageal reflux disease), History of cerebrovascular accident,   
Hyperlipemia, Liver cancer, Obesity, Multiple lung nodules, New onset atrial   
flutter, Non-alcoholic cirrhosis, Nonobstructive atherosclerosis of coronary   
artery, Nonrheumatic aortic (valve) stenosis, OAB (overactive bladder), CAREN   
treated with BiPAP, Osteoarthritis, Paroxysmal A-fib, PCK (polycystic kidney   
disease), Peripheral neuropathy, Right bundle branch block (RBBB),   
Thrombocytopenia.     
    
The patient presented to [facility] ED on 2024 from his [facility] with altered mental   
status. Patient's daughter reported that he patient was experiencing slurred   
speech, difficulty speaking, and was not using his left leg. Initially, there w  
as concern for acute CVA, but work up was negative. He has been admitted for   
management of acute encephalopathy. Of note, the patient was admitted to [facility] in   
December with pneumonia and has had a residual cough since then. ST consult was   
placed for difficulty swallowing. He was made NPO by RN due to difficulty   
swallowing medications. BSE was completed 24 recommending Easy to Chew   
textures / Thin liquids with plan for MBSS to further assess concern for   
aspiration, especially when consuming thin liquids.    
Current Diet Ordered: Easy to Chew textures / Thin liquids    
Dentition: Natural Teeth and Missing Teeth    
Mental Status: Impaired (Difficulty following commands throughout the   
evaluation, current encephalopathy)    
Respiratory Status: Oxygenating on 2L/M nasal cannula    
Penetration-Aspiration Scale    
Penetration-Aspiration Scale:     
OBJECTIVE ASSESSMENT OF SWALLOW FUNCTION (QUANTITATIVE ? PER TRIAL):     
     PENETRATION / ASPIRATION SCALE (KEY):    
   1 = does not enter airway    
   2 = enters airway/above vocal folds/ejected    
   3 = enters airway/above vocal folds/not ejected    
   4 = enters airway/contacts vocal folds/ejected    
   5 = enters airway/contacts vocal folds/not ejected    
   6 = enters airway/below vocal folds/ejected    
   7 = enters airway/below vocal folds/not ejected despite effort    
   8 = enters airway/below vocal folds/no effort    
       
     VIDEOFLOROSCOPIC SCALE SCORE (KEY):     
   Grade I = aspiration of material that has penetrated into the laryngeal   
   vestibule, intact cough reflex    
   Grade II = aspiration < 10 % of the bolus, intact cough reflex    
   Grade III = aspiration of < 10 % of the bolus, reduced cough reflex or    
        aspiration of > 10 % of the bolus, intact cough reflex    
   Grade IV = aspiration of > 10 % of the bolus, reduced cough reflex    
    
Penetration-Aspiration Scale Score    
Thin Liquid via teaspoon:     
      Result: 1= does not enter airway    
Thin Liquid via teaspoon Trial 2:     
      Result: 7= enters airways/below vocal folds/not ejected despite effort    
Thin Liquid via small single sip: cup:     
      Result: 5= enters airways/contacts vocal folds/not ejected    
Nectar Thick Liquid via small single sip: cup:     
      Result: 2= enter airway/above vocal folds/ejected    
      Comment:     
Cued cough and re-swallow with max cues to clear contrast from the vocal folds   
from thin by cup trial.    
Nectar Thick Liquid via small single sip: cup Trial 2:     
      Result: 1= does not enter airway    
Pudding via teaspoon:     
      Result: 1= does not enter airway    
      Comment:     
Esophageal screen - Retention with pudding in the lower esophagus with min   
retrograde flow. NTL wash via tsp did not clear retention in the lower   
esophagus.    
1/4 Cookie:     
      Result: 1= does not enter airway    
      Comment:     
SLP initially provided 1/2 cookie with no initiation of mastication.    
Thin Liquid via single sip: straw:     
      Result: 7= enters airways/below vocal folds/not ejected despite effort    
      Comment:     
Delayed swallow    
Nectar Thick Liquid via small single sip: cup Trial 3:     
      Result: 1= does not enter airway    
      Comment:     
Max cues to double swallow; however, the patient was unable to complete double   
swallow.    
Oral Phase    
Labial Seal: Escape beyond mid-chin    
Tongue Control During Bolus Hold: Posterior escape of greater than half of bolus    
Bolus Preparation/Mastication: Disorganized chewing/mashing with solid pieces of  
bolus unchewed    
Bolus Transport/Lingual Motion: Slowed tongue motion    
Oral Residue: Residue collection on oral structures    
Pharyngeal Phase    
Initiation of Pharyngeal Swallow: Bolus head in pyriforms (thin by tsp spilled   
to his VF prior to swallow onset)    
Soft Palate Elevation: No bolus between soft palate and pharyngeal wall    
Laryngeal Elevation: Partial superior movement thyroid cart/partial apprx aryt-  
epig petiole    
Anterior Hyoid Excursion: Partial anterior movement    
Epiglottic Movement: Complete inversion    
Laryngeal Vestibule Closure at Height of Swallow: Incomplete; narrow column of   
air/contrast in laryngeal vestibule    
Pharyngeal Stripping Wave: Present - diminished    
Pharyngoesophageal Segment Opening: Complete distension and complete duration;   
no obstruction of flow    
Tongue Base Retraction: Wide column of contrast between tongue base & post.   
pharyngeal wall    
Pharyngeal Residue: Collection of residue within or on pharyngeal structures    
Esophageal Phase    
Esophageal Clearance: Esophageal retention w/ retrograde flow below   
pharyngoesophageal seg.    
Diagnosis/Impression    
Diagnosis: Moderate oropharyngeal dysphagia R13.12    
Impression:     
The oral phase is primarily marked by...    
-Decreased bolus control with >1/2 of the bolus spilling posteriorly to the   
pyriforms prior to swallow onset observed with liquids especially.    
-Slowed tongue motion for A-P transport.    
-Mild oral residue after the swallow with large sips, which mostly cleared with   
independent initiation of a second swallow as needed.    
-Prolonged, disorganized mastication of cookie trial.    
    
The pharyngeal phase is primarily marked by...    
-Decreased airway closure during the swallow due to decreased anterior hyoid   
excursion and decreased laryngeal elevation.    
-Mildly-moderately decreased tongue base retraction and mildly decreased   
pharyngeal stripping wave with resulting in mild-moderate pharyngeal residues   
after the swallow.    
-Aspiration during the swallow of thin liquids via tsp and straw.    
Recommendations    
Diet: Mechanical Soft Textures (Soft and bite size textures IDDSI Level 6) and   
Nectar-thick Liquids    
Compensatory Strategies: Small Bites, Small Sips, Slow Rate, Alternate   
bites/solids and sips/liquids, Sitting upright and Remain sitting upright for 30  
minutes after PO intake    
Supervision: 1:1 Close Supervision (Feeding Assistance)    
Recommend Repeat Modified Barium Swallow: TBD    
Need for Skilled Speech Therapy Services: Yes    
Comment:     
-Train the patient in use of strategies to decrease risk for aspiration and   
reflux aspiration.    
-Ongoing assessment of diet tolerance of recommended textures. Patient is ok for  
trials of regular and thin textures at bedside with SLP if deemed clinically   
appropriate to consider diet advancement.    
-Once able to follow commands, consider training the patient in oropharyngeal   
exercise program.    
Recommended Referrals: GI Consult (No immediate GI consult recommended; however,  
retention of barium in lower esophagus. If concern for increased s/s of reflux   
or regurgitation, please consider inpatient GI consult.)    
Education Completed: 1. Described result of evaluation. and 4. Family/caregivers  
understand evaluation & agree w/ goals & tx plan.    
Comment:     
SLP called the patient's daughter and Dixie HODGES, and educated her in the   
results and recommendations of the MBSS. Patient's daughter agreeable to   
recommendations. Additionally, SLP educated RN, Ade, in results and   
recommendations of the study and provided handouts for the recommended diet and   
strategies to be posted in the patient's room.    
Status    
Active ST Patient: Active    
Contact Information    
[facility]::     
Sanaz Lee M.A. CCC-SLP?    
Speech-Language Pathologist??    
[address]??    
[place] [address]??    
[email or web address]??    
[phone]???

## 2024-02-09 NOTE — PCM.OP.PRO
Procedure Report
Date of Procedure: 02/09/24

Assessment & Plan
Assessment/Plan
(1) Cirrhosis: 
QUALIFIERS:               Ascites presence: with ascites  Hepatic cirrhosis type: unspecified hepatic cirrhosis  Qualified Code(s): K74.60 - Unspecified cirrhosis of liver; R18.8 - Other ascites
PLAN: 
PROCEDURE: Ultrasound guided paracentesis

ORDERING PROVIDER: Dr. Spicer

INDICATION: Male, 84 years old.  Abdominal ascites.

PROVIDER: NUBIA Cabrales
_______________________________________

TECHNIQUE:

The risks, benefits, and alternatives to the procedure were explained to the patient and patient's daughter via telephone. The specific risks of bleeding, infection, and damage to bowel were detailed and accepted.  Telephone consent was obtained 
from the daughter.

The abdomen was ultrasonographically surveyed. An appropriate pocket of fluid was identified in the right upper quadrant. The skin was prepped with chlorhexidine and sterile field established.  2% lidocaine was used for local anesthetic.  

Using ultrasound guidance, the peritoneal cavity was accessed with a 5-[place] paracentesis needle/catheter system. The trocar was removed. A total of 8200 ml of clear yellow colored fluid was removed from the peritoneal cavity.  100 mL of this fluid 
was sent to the laboratory for analysis.  Despite fluid remaining on ultrasound, the catheter was withdrawn at 8200 mL because of the patient's soft blood pressure.  A sterile dressing was applied. 

The procedure was well tolerated.
_________________________________________

IMPRESSION:

Successful ultrasound-guided paracentesis with right upper quadrant access site.

8200 mL was drained; however, a small amount of ascites did remain.

Procedures Radiology
Radiology US Procedures: 43910 Paracentesis

## 2024-02-09 NOTE — EEG_ITS
EEG Results    
Procedure Details    
EEG Procedure Details:     
EEG Report    
    
     
Patient:  Mitesh Braun    
YOB: 1940    
Sex: Male    
Age: 84 years old    
    
Inpatient routine EEG report:    
    
Study start time: 02/09/2024 02:39 PM    
End Time: 02/09/2024 03:00 PM    
    
History:  84 year old male with cirrhosis presented with waxing and waning   
mental status.    
     
Indication:  rule out seizure    
    
Technical Description: This is a 21-channel digital EEG recording with time-  
locked video and single-channel electrocardiogram. Electrodes are placed   
according to the 10 to 20 International System. Additional T1 and T2 electrodes   
were placed. The patient was monitored continuously by EEG technicians by video   
and EEG recording was reviewed intermittently with annotations to the EEG record  
made every two hours.  Portions of this record are reviewed using bandpass   
filters of 1 to 70 Hz and sensitivity of 7mV/mm.     
     
    
EEG DESCRIPTION    
     
Background: This recording was obtained during awake and drowsy state. In the   
maximally alert state, a posterior dominant rhythm was absent.  During   
drowsiness, there was attenuation of the waking background. Stage II sleep   
architecture was not captured. Background was comprised of diffuse polymorphic   
delta slow activity    
     
    
Activation Procedures: Photic stimulation did not induced normal physiological   
response. Hyperventilation was not performed.    
    
Sporadic Epileptiform Discharges: none    
    
Focal slow activity: none    
    
Rhythmic or Periodic activity:  none    
     
Seizures: none    
    
Patient Events: none    
    
EEG DIAGNOSIS    
Diffuse slow activity, moderate in degree    
    
     
CLINICAL INTERPRETATION    
This routine EEG is indicative of moderate degree of encephalopathy of   
nonspecific etiology. No epileptiform activity or seizures were noted during   
this period of recording.    
    
    
EEG read by: Annie Riley MD    
    
---------------------------------------------    
[facility], [facility], M.D.    
Neurology

## 2024-02-09 NOTE — PCM.PN.HOSP
Reason for Visit
Reason for Visit: 
Diagnoses

Anemia in other chronic diseases classified elsewhere (02/08/24)
Obstructive sleep apnea (adult) (pediatric) (02/08/24)
Encephalopathy, unspecified (02/08/24)
Gastro-esophageal reflux disease without esophagitis (02/08/24)
Unspecified cirrhosis of liver (02/08/24)
Gout, unspecified (02/08/24)
Other symptoms and signs involving the nervous system (02/08/24)
Dependence on other enabling machines and devices (02/08/24)



Subjective
Subjective
Patient overnight with episodes of urinary retention with Davis catheter eventually placed and initiated on Flomax therapy.  Patient with EEG being performed today, awaiting results.  Patient's status post paracentesis with significant fluid 
removed, 8200 cc.  Discussed case and current presentation and care plan also with his daughter. Patient currently denying any complaints although he does feel mildly fatigued but feels more alert than day prior.  Patient denies fevers, chills, 
nausea, emesis, abdominal pain, chest pain or dyspnea.

Objective Data
Objective Data
Vital Signs: 
Vital Signs

Temp Pulse Resp BP Pulse Ox O2 Del Method O2 Flow Rate
 97.4 F L  65   18   93/65   98   Nasal Cannula   2 
 02/09/24 02:48  02/09/24 02:48  02/09/24 02:48  02/09/24 02:48  02/09/24 02:48  02/09/24 02:52  02/09/24 02:52



Oxygen Flow Rate (L/min)       2                                                
Oxygen Delivery Method         Nasal Cannula                                    
Weight:                        251 lb 5.231 oz                                  
Body Mass Index (BMI)          36.0                                             


Intake & Output: 
Intake and Output for Last 24 Hours

 02/07/24 02/08/24 02/09/24
 23:59 23:59 23:59
Intake Total 500 / 500 2070 / 2070 50 / 50
Output Total 0 / 0 1325 / 1475 1000 / 1000
Balance 500 / 500 745 / 595 -950 / -950


Lab / Micro Data

02/09/24 05:45          

02/09/24 05:45          

Labs: 
Laboratory Results - last 24 hr

02/08/24 07:10: WBC 4.3 L, RBC 3.28 L, Hgb 10.9 L, Hct 34.8 L, .1 H D, MCH 33.2 H, MCHC 31.3 L, RDW Std Deviation 65.6 H, RDW Coeff of Ilia 16.9 H, Plt Count 50 L*, MPV 11.5, Immature Gran % (Auto) 0.500, Neut % (Auto) 83.5 H, Lymph % (Auto) 
6.4 L, Mono % (Auto) 7.5, Eos % (Auto) 1.9, Baso % (Auto) 0.2, Absolute Neuts (auto) 3.6, Absolute Lymphs (auto) 0.27 L, Nucleated RBC % 0, Differential Comment COMMENT, Diff Path Review May foll, Platelet Estimate MOD DEC, Anisocytosis 1+, Sodium 
Cancelled, Potassium Cancelled, Chloride Cancelled, Carbon Dioxide Cancelled, Anion Gap Cancelled, BUN Cancelled, Creatinine Cancelled, Estim Creat Clear Calc Cancelled, Est GFR (MDRD) Af Amer Cancelled, Est GFR (MDRD) Non-Af Cancelled, 
BUN/Creatinine Ratio Cancelled, Glucose Cancelled, Calcium Cancelled, Magnesium Cancelled, Total Bilirubin Cancelled, AST Cancelled, ALT Cancelled, Alkaline Phosphatase Cancelled, Total Protein Cancelled, Albumin Cancelled, Globulin Cancelled, 
Albumin/Globulin Ratio Cancelled, Triglycerides Cancelled, Cholesterol Cancelled, LDL Cholesterol Cancelled, VLDL Cholesterol Cancelled, HDL Cholesterol Cancelled, Procalcitonin Cancelled, TSH Cancelled
02/08/24 08:19: Sodium 141, Potassium 3.9, Chloride 116 H, Carbon Dioxide 23.0, Anion Gap 2 L, BUN 35 H, Creatinine 0.98, Estim Creat Clear Calc 70.95, Est GFR (MDRD) Af Amer 94, Est GFR (MDRD) Non-Af 78, BUN/Creatinine Ratio 35.9 H, Glucose 92, 
Calcium 12.0 H, Magnesium 2.3, Total Bilirubin 0.80, AST 42 H, ALT 24, Alkaline Phosphatase 132 H, Total Protein 5.6 L, Albumin 1.9 L, Globulin 3.7, Albumin/Globulin Ratio 0.5 L, Triglycerides 79, Cholesterol 107, LDL Cholesterol 68, VLDL 
Cholesterol 16, HDL Cholesterol 23 L, Procalcitonin 0.17 H, TSH 3.28
02/08/24 08:20: MRSA (PCR) Negative
02/08/24 09:05: Ionized Calcium 6.38 H
02/08/24 16:11: Urine Color Yellow, Urine Clarity Clear, Urine pH 6.5, Ur Specific Gravity 1.015, Urine Protein Negative, Urine Glucose (UA) Normal, Urine Ketones Negative, Urine Occult Blood Negative, Urine Nitrite Negative, Urine Bilirubin 
Negative, Urine Urobilinogen Normal, Ur Leukocyte Esterase Negative, Urine RBC 0 SEEN, Urine WBC 0 SEEN, Ur Squamous Epith Cells 0 SEEN, Urine Bacteria 0 SEEN, Urine Mucus 0 SEEN


Micro: 
Microbiology

02/07/24 15:15   Mucosa - Nose   SARS-CoV-2, Influenza & RSV (PCR) - Final


Radiography
Diagnostic Testing: 
Radiology Impression

Echocardiogram  02/07/24 17:54
Interpretation Summary
The left ventricular ejection fraction is 55 %.
Moderately dilated right ventricle.
Mild to moderate global right ventricular systolic dysfunction.
There is severe biatrial dilatation.
Mild tricuspid valve insufficiency.
Mean gradient across the bioprosthetic aortic valve 6.7 mmHg.
Severe mitral annular calcification that extends into the LVOT as well.
Bubble contrast study is negative for PFO/ASD.
Technically difficult study with suboptimal images. Consider cardiac MRI for further evaluation if
clinically indicated.
 
 
______________________________________________________________________________
Ordering Physician: Pebbles Schwartz
Referring Physician: Júnior Ahuja M.D.
Performed By: Lory Guillen RDCS
Electronically signed by: Britney Santiago MD on 02/08/2024 02:54 PM
 
 


Abdomen Ultrasound  02/08/24 15:06
IMPRESSION:
Ascites.
 
Electronically Signed:
Teodoro Casillas MD
2024/02/08 at 22:57 EST
[place] Location ID and State: [id number] / [place]
Tel [phone], Service support  [phone], Fax [phone]
 




Physical Exam
Narrative
Physical Examination:
General: Awake, alert, oriented to self, place, recent events, remains cooperative, laying in the PCU bed, notes feeling decent, discussed plan for paracentesis, awaiting EEG, ongoing treatment of his PNA, davis for retention. Discussed that his 
daughter had been updated.
Skin: Normal color, normal turgor, no icterus, no cyanosis except for occasional staged ecchymoses, stasis disease.
HEENT: AT/NC, EOMI, PERRLA, MMM.
Lungs: Diminished, diffusely, greater posterior, mildly decreased effort, no evidence of any distress, no rales, ronchi or wheezing.
Heart: Regular rate and rhythm; no gallop, rub audible.
Abdomen: Soft, obese, still distended appearance, NTTP, having paracentesis today, obese, unable to ascertain distention given patient habitus.
Extremities: No cyanosis, no clubbing, bilateral lower extremity not markedly pitting ankle edema.
Neurological: Patient awake, alert, oriented as noted, cognitive function intact; pupils equally reactive to light and accommodation, cranial nerves grossly normal, moving all 4 extremities except significant left-sided hemiplegia left lower 
extremity, strength he notes mildly improved since initial presentation, remains moderately globally decreased.
Psychiatric: Affect appears more interactive, mildly fatigued, no acute evidence of depressive or anxiety feelings.

Assessment & Plan
Assessment/Plan
(1) Encephalopathy acute: 
PLAN: 
Plan
The patient is an 85 y/o M w/ PMHx: Chronic anemia/AOCD, CKD stage III unclear subtype, HTN, HLD, Hx CVA, Anxiety an Depression, Asthma, Valvular Heart Disease, PAF/Flutter, Nonobstructive CAD, CAREN on BIPAP, Non-alcoholic Liver Cirrhosis w/ Liver 
cancer w/ associated chronic thrombocytopenia/anemia/mild hyperammonemia, Obesity who presents to the [facility] ED on 2/7/24 with history of worsening fatigue, malaise, debility and weakness with chronic left lower extremity debilities following previous 
stroke but worse with increased lethargy prompting family to bring him into the ED for evaluation with initial concern for possible stroke.

1.  Acute Encephalopathy/Metabolic/Infectious, Adult FTT, multifactorial as noted also #2, #3, increased debility/weakness above baseline w/ Possible RUL Pneumonia/RUL Consolidation with possible gram-negative versus gram-positive organism versus 
Possible Metastatic Disease: Admission labs included CBC with WBC 4.9, N 110.7, .3, platelet 58 with lymphopenia, CMP with chloride 110, BUN/creatinine 40/1.19, glucose 119, calcium 12.5, hepatic profile with alk phos 156, ammonia 34, BNP 
104.6, troponin 24, chest x-ray with blunting right costophrenic angle with bibasilar atelectasis with increased markings right upper lobe abutting the right minor fissure of unclear significance, possibly atelectasis versus infiltrate, CT brain 
with chronic involutional changes, CTA head and neck with no significant major vaso-occlusive disease in the head or neck, evidence of a right upper lobe consolidation, MRI of the brain with chronic involutional changes with no acute intracranial 
findings. Admitted to PCU, given MRI without acute CVA suspect process in RUL likely associated with encephalopathy and increased weakness, maintained on oxygen with wean as tolerated to room air, continue ATC budesonide, PRN albuterol, initiated 
given history of recent hospitalization within the 90-day leslee on IV Zosyn and Vancomycin however negative MRSA screen thus de-escalated off Vanc therapy. Negative urine antigens. Procalcitonin 0.17.  Bld cx x 2 obtained in the ED. PT/OT/ST/CM 
consulted for discharge planning. Given history will need repeat dedicated CT following this treatment to assure not in fact metastatic disease component.

2.  Increased left lower extremity weakness above baseline with encephalopathy, stroke ruled out, likely secondary to #1 as noted: Upon admission concern for initially recurrent stroke with baseline left-sided hemiplegia but worsened, CT brain with 
chronic involutional changes, CTA head and neck with no significant major vaso-occlusive disease in the head or neck, evidence of a right upper lobe consolidation, MRI of the brain with chronic involutional changes with no acute intracranial 
findings with stroke ruled out, allergy to statin with significant thrombocytopenia thus antiplatelet deferred and lieu suspicion for stroke upon admission, echocardiogram with EF 55%, moderately dilated RV, mild to moderate global RV systolic 
dysfunction, severe biatrial dilatation, mild TVI, bioprosthetic aortic valve with mean gradient across the valve 6.7 mmHg, bubble contrast study negative.  Given no evidence of stroke following MRI completion hypertensive regimen restarted. [facility] 
teleneurology consulted and at this point they do believe that his presentation is likely infectious secondary to his new pneumonia however believe it could be related with his cirrhotic disease and possibly also hypercalcemia w/ recommendation of 
titrating bowel movements 2-3 daily w/ bowel regimen started; however, abdominal likely distended secondary to ascites confirmed on limited 2/8/24 US, 2/9/24 paracentesis performed with 8200 cc fluid removed and diagnostic evaluation studies 
requested including cytology. Given amount removed IV albumin ~6 g/L administered with 50 gm 25% albumin administered. Neurology also although lower suspicion recommended EEG which has also been performed 2/9/24, awaiting read. ST evaluations also 
with concerns thus patient with pending MBS evaluation concurrently.

3.  Hypercalcemia: Likely contributing to his weakness and altered mental status most probably secondary to underlying malignancy, has been previously been higher, ICa elevated 6.38 (4.26-5.20 range), will obtain PTH, vitamin D 1,25-OH2 and vitamin 
D 25-OH, given noted limited US with notable ascites, planned paracentesis, but given concurrent hypercalcemia, will administer lasix 40 mg IV x 1 now, also based on corrected Ca++ level 13.2 will order per medronate 60 mg single dose now and 
reassess in a.m. for additional dose.  From discussion with pharmacy there are limited doses of committee rate in house.

4.  Nonalcoholic cirrhosis with underlying liver cancer with associated pancytopenia, chronic/chronic iron deficiency anemia: Patient following with Dr. Ortega, continued chronic oral iron supplementation, continued patient home metoprolol as BP 
allows, rifaximin. NH level 34; however, given constipation complaint noted to Neurology per patient will administer lactulose 20 mg BID regimen to titrate bowel movements, will continue to re-evaluate and alter as needed. 2/8/24 limited abdominal 
US with noted ascites in all quadrants, 2/9/24 paracentesis performed with 8200 cc fluid removed and diagnostic evaluation studies requested including cytology. Given amount removed IV albumin ~6 g/L administered with 50 gm 25% albumin administered. 

5.  Urinary retention with Davis catheter placement: 2/8/2024-2/9/2024 patient with significant urinary retention with straight cath attempts however eventually Davis catheter placement necessary, urinalysis obtained and unremarkable, Flomax 
started.  Will continue and make reattempts to de-escalate catheter however if continued require catheter may necessitate outpatient follow-up with urology.

6.  PAF: Patient not chronically anticoagulated secondary to his underlying liver disease process with thrombocytopenia, maintained on diltiazem, encourage continued outpatient follow-up with cardiology as previously arranged.

7.  Valvular heart disease: Patient status post AV bioprosthetic valve, echo 7/23 with EF 60% with indeterminate diastolic function with severely dilated RV and global RV dysfunction, severe mitral annular calcification with RVSP 44 mmHg.

8.  Depression and anxiety: Will continue patient home sertraline regimen.

9.  GERD: We will continue patient on PPI.

10.  Gout: We will continue patient home allopurinol regimen.

11.  CAREN: BiPAP nightly.

12.  DVT prophylaxis: SCDs. Holding administration of any chemoprophylaxis concept given significant thrombocytopenia.

13.  CODE STATUS: DNR-CCA, no intubation.

Charges/Coding
Visit Charges
Inpatient E&M: 13468 Subs Hosp L3

## 2024-02-09 NOTE — PN.HOSP_ITS
Reason for Visit    
Reason for Visit:     
Diagnoses    
    
Anemia in other chronic diseases classified elsewhere (02/08/24)    
Obstructive sleep apnea (adult) (pediatric) (02/08/24)    
Encephalopathy, unspecified (02/08/24)    
Gastro-esophageal reflux disease without esophagitis (02/08/24)    
Unspecified cirrhosis of liver (02/08/24)    
Gout, unspecified (02/08/24)    
Other symptoms and signs involving the nervous system (02/08/24)    
Dependence on other enabling machines and devices (02/08/24)    
    
    
    
Subjective    
Subjective    
Patient overnight with episodes of urinary retention with Davis catheter   
eventually placed and initiated on Flomax therapy.  Patient with EEG being   
performed today, awaiting results.  Patient's status post paracentesis with   
significant fluid removed, 8200 cc.  Discussed case and current presentation and  
care plan also with his daughter. Patient currently denying any complaints   
although he does feel mildly fatigued but feels more alert than day prior.  Virginie  
ent denies fevers, chills, nausea, emesis, abdominal pain, chest pain or   
dyspnea.    
    
Objective Data    
Objective Data    
Vital Signs:     
Vital Signs    
    
    
    
Temp Pulse Resp BP Pulse Ox O2 Del Method O2 Flow Rate    
     
 97.4 F L  65   18   93/65   98   Nasal Cannula   2     
     
 02/09/24 02:48  02/09/24 02:48  02/09/24 02:48  02/09/24 02:48  02/09/24 02:48   
02/09/24 02:52  02/09/24 02:52    
    
    
    
    
Oxygen Flow Rate (L/min)         2                                                
       
Oxygen Delivery Method           Nasal Cannula                                    
       
Weight:                          251 lb 5.231 oz                                  
       
Body Mass Index (BMI)            36.0                                             
       
    
    
Intake & Output:     
                       Intake and Output for Last 24 Hours    
    
    
    
 02/07/24 02/08/24 02/09/24    
    
 23:59 23:59 23:59    
     
Intake Total 500 / 500 2070 / 2070 50 / 50    
     
Output Total 0 / 0 1325 / 1475 1000 / 1000    
     
Balance 500 / 500 745 / 595 -950 / -950    
    
    
    
Lab / Micro Data    
    
                                                        02/09/24 05:45              
    
                                                        02/09/24 05:45              
    
Labs:     
                         Laboratory Results - last 24 hr    
    
02/08/24 07:10: WBC 4.3 L, RBC 3.28 L, Hgb 10.9 L, Hct 34.8 L, .1 H D,   
MCH 33.2 H, MCHC 31.3 L, RDW Std Deviation 65.6 H, RDW Coeff of Ilia 16.9 H, Plt   
Count 50 L*, MPV 11.5, Immature Gran % (Auto) 0.500, Neut % (Auto) 83.5 H, Lymph  
% (Auto) 6.4 L, Mono % (Auto) 7.5, Eos % (Auto) 1.9, Baso % (Auto) 0.2, Absolute  
Neuts (auto) 3.6, Absolute Lymphs (auto) 0.27 L, Nucleated RBC % 0, Differential  
Comment COMMENT, Diff Path Review May foll, Platelet Estimate MOD DEC,   
Anisocytosis 1+, Sodium Cancelled, Potassium Cancelled, Chloride Cancelled,   
Carbon Dioxide Cancelled, Anion Gap Cancelled, BUN Cancelled, Creatinine   
Cancelled, Estim Creat Clear Calc Cancelled, Est GFR (MDRD) Af Amer Cancelled,   
Est GFR (MDRD) Non-Af Cancelled, BUN/Creatinine Ratio Cancelled, Glucose   
Cancelled, Calcium Cancelled, Magnesium Cancelled, Total Bilirubin Cancelled,   
AST Cancelled, ALT Cancelled, Alkaline Phosphatase Cancelled, Total Protein   
Cancelled, Albumin Cancelled, Globulin Cancelled, Albumin/Globulin Ratio   
Cancelled, Triglycerides Cancelled, Cholesterol Cancelled, LDL Cholesterol   
Cancelled, VLDL Cholesterol Cancelled, HDL Cholesterol Cancelled, Procalcitonin   
Cancelled, TSH Cancelled    
02/08/24 08:19: Sodium 141, Potassium 3.9, Chloride 116 H, Carbon Dioxide 23.0,   
Anion Gap 2 L, BUN 35 H, Creatinine 0.98, Estim Creat Clear Calc 70.95, Est GFR   
(MDRD) Af Amer 94, Est GFR (MDRD) Non-Af 78, BUN/Creatinine Ratio 35.9 H,   
Glucose 92, Calcium 12.0 H, Magnesium 2.3, Total Bilirubin 0.80, AST 42 H, ALT   
24, Alkaline Phosphatase 132 H, Total Protein 5.6 L, Albumin 1.9 L, Globulin   
3.7, Albumin/Globulin Ratio 0.5 L, Triglycerides 79, Cholesterol 107, LDL   
Cholesterol 68, VLDL Cholesterol 16, HDL Cholesterol 23 L, Procalcitonin 0.17 H,  
TSH 3.28    
02/08/24 08:20: MRSA (PCR) Negative    
02/08/24 09:05: Ionized Calcium 6.38 H    
02/08/24 16:11: Urine Color Yellow, Urine Clarity Clear, Urine pH 6.5, Ur   
Specific Gravity 1.015, Urine Protein Negative, Urine Glucose (UA) Normal, Urine  
Ketones Negative, Urine Occult Blood Negative, Urine Nitrite Negative, Urine   
Bilirubin Negative, Urine Urobilinogen Normal, Ur Leukocyte Esterase Negative,   
Urine RBC 0 SEEN, Urine WBC 0 SEEN, Ur Squamous Epith Cells 0 SEEN, Urine   
Bacteria 0 SEEN, Urine Mucus 0 SEEN    
    
    
Micro:     
                                  Microbiology    
    
02/07/24 15:15   Mucosa - Nose   SARS-CoV-2, Influenza & RSV (PCR) - Final    
    
    
Radiography    
Diagnostic Testing:     
                              Radiology Impression    
    
Echocardiogram  02/07/24 17:54    
Interpretation Summary    
The left ventricular ejection fraction is 55 %.    
Moderately dilated right ventricle.    
Mild to moderate global right ventricular systolic dysfunction.    
There is severe biatrial dilatation.    
Mild tricuspid valve insufficiency.    
Mean gradient across the bioprosthetic aortic valve 6.7 mmHg.    
Severe mitral annular calcification that extends into the LVOT as well.    
Bubble contrast study is negative for PFO/ASD.    
Technically difficult study with suboptimal images. Consider cardiac MRI for   
further evaluation if    
clinically indicated.    
     
     
______________________________________________________________________________    
Ordering Physician: Pebbles Schwartz    
Referring Physician: Júnior Ahuja M.D.    
Performed By: Lory Guillen RDCS    
Electronically signed by: Britney Santiago MD on 02/08/2024 02:54 PM    
     
     
    
    
Abdomen Ultrasound  02/08/24 15:06    
IMPRESSION:    
Ascites.    
     
Electronically Signed:    
Teodoro Casillas MD    
2024/02/08 at 22:57 EST    
Reading Location ID and State: [id number] / [place]    
Tel [phone], Service support  [phone], Fax [phone]    
     
    
    
    
    
Physical Exam    
Narrative    
Physical Examination:    
General: Awake, alert, oriented to self, place, recent events, remains   
cooperative, laying in the PCU bed, notes feeling decent, discussed plan for   
paracentesis, awaiting EEG, ongoing treatment of his PNA, davis for retention.   
Discussed that his daughter had been updated.    
Skin: Normal color, normal turgor, no icterus, no cyanosis except for occasional  
staged ecchymoses, stasis disease.    
HEENT: AT/NC, EOMI, PERRLA, MMM.    
Lungs: Diminished, diffusely, greater posterior, mildly decreased effort, no   
evidence of any distress, no rales, ronchi or wheezing.    
Heart: Regular rate and rhythm; no gallop, rub audible.    
Abdomen: Soft, obese, still distended appearance, NTTP, having paracentesis   
today, obese, unable to ascertain distention given patient habitus.    
Extremities: No cyanosis, no clubbing, bilateral lower extremity not markedly   
pitting ankle edema.    
Neurological: Patient awake, alert, oriented as noted, cognitive function   
intact; pupils equally reactive to light and accommodation, cranial nerves   
grossly normal, moving all 4 extremities except significant left-sided   
hemiplegia left lower extremity, strength he notes mildly improved since initial  
presentation, remains moderately globally decreased.    
Psychiatric: Affect appears more interactive, mildly fatigued, no acute evidence  
of depressive or anxiety feelings.    
    
Assessment & Plan    
Assessment/Plan    
(1) Encephalopathy acute:     
PLAN:     
Plan    
The patient is an 85 y/o M w/ PMHx: Chronic anemia/AOCD, CKD stage III unclear   
subtype, HTN, HLD, Hx CVA, Anxiety an Depression, Asthma, Valvular Heart   
Disease, PAF/Flutter, Nonobstructive CAD, CAREN on BIPAP, Non-alcoholic Liver   
Cirrhosis w/ Liver cancer w/ associated chronic thrombocytopenia/anemia/mild   
hyperammonemia, Obesity who presents to the [facility] ED on 2/7/24 with history of   
worsening fatigue, malaise, debility and weakness with chronic left lower   
extremity debilities following previous stroke but worse with increased lethargy  
prompting family to bring him into the ED for evaluation with initial concern   
for possible stroke.    
    
1.  Acute Encephalopathy/Metabolic/Infectious, Adult FTT, multifactorial as   
noted also #2, #3, increased debility/weakness above baseline w/ Possible RUL   
Pneumonia/RUL Consolidation with possible gram-negative versus gram-positive   
organism versus Possible Metastatic Disease: Admission labs included CBC with   
WBC 4.9, N 110.7, .3, platelet 58 with lymphopenia, CMP with chloride   
110, BUN/creatinine 40/1.19, glucose 119, calcium 12.5, hepatic profile with alk  
phos 156, ammonia 34, .6, troponin 24, chest x-ray with blunting right   
costophrenic angle with bibasilar atelectasis with increased markings right   
upper lobe abutting the right minor fissure of unclear significance, possibly   
atelectasis versus infiltrate, CT brain with chronic involutional changes, CTA   
head and neck with no significant major vaso-occlusive disease in the head or   
neck, evidence of a right upper lobe consolidation, MRI of the brain with   
chronic involutional changes with no acute intracranial findings. Admitted to   
PCU, given MRI without acute CVA suspect process in RUL likely associated with   
encephalopathy and increased weakness, maintained on oxygen with wean as   
tolerated to room air, continue ATC budesonide, PRN albuterol, initiated given   
history of recent hospitalization within the 90-day leslee on IV Zosyn and   
Vancomycin however negative MRSA screen thus de-escalated off Vanc therapy.   
Negative urine antigens. Procalcitonin 0.17.  Bld cx x 2 obtained in the ED.   
PT/OT/ST/CM consulted for discharge planning. Given history will need repeat   
dedicated CT following this treatment to assure not in fact metastatic disease   
component.    
    
2.  Increased left lower extremity weakness above baseline with encephalopathy,   
stroke ruled out, likely secondary to #1 as noted: Upon admission concern for   
initially recurrent stroke with baseline left-sided hemiplegia but worsened, CT   
brain with chronic involutional changes, CTA head and neck with no significant   
major vaso-occlusive disease in the head or neck, evidence of a right upper lobe  
consolidation, MRI of the brain with chronic involutional changes with no acute   
intracranial findings with stroke ruled out, allergy to statin with significant   
thrombocytopenia thus antiplatelet deferred and lieu suspicion for stroke upon   
admission, echocardiogram with EF 55%, moderately dilated RV, mild to moderate   
global RV systolic dysfunction, severe biatrial dilatation, mild TVI,   
bioprosthetic aortic valve with mean gradient across the valve 6.7 mmHg, bubble   
contrast study negative.  Given no evidence of stroke following MRI completion   
hypertensive regimen restarted. [facility] consulted and at this point   
they do believe that his presentation is likely infectious secondary to his new   
pneumonia however believe it could be related with his cirrhotic disease and   
possibly also hypercalcemia w/ recommendation of titrating bowel movements 2-3   
daily w/ bowel regimen started; however, abdominal likely distended secondary to  
ascites confirmed on limited 2/8/24 US, 2/9/24 paracentesis performed with 8200   
cc fluid removed and diagnostic evaluation studies requested including cytology.  
Given amount removed IV albumin ~6 g/L administered with 50 gm 25% albumin   
administered. Neurology also although lower suspicion recommended EEG which has   
also been performed 2/9/24, awaiting read. ST evaluations also with concerns   
thus patient with pending MBS evaluation concurrently.    
    
3.  Hypercalcemia: Likely contributing to his weakness and altered mental status  
most probably secondary to underlying malignancy, has been previously been   
higher, ICa elevated 6.38 (4.26-5.20 range), will obtain PTH, vitamin D 1,25-OH2  
and vitamin D 25-OH, given noted limited US with notable ascites, planned   
paracentesis, but given concurrent hypercalcemia, will administer lasix 40 mg IV  
x 1 now, also based on corrected Ca++ level 13.2 will order per medronate 60 mg   
single dose now and reassess in a.m. for additional dose.  From discussion with   
pharmacy there are limited doses of committee rate in house.    
    
4.  Nonalcoholic cirrhosis with underlying liver cancer with associated   
pancytopenia, chronic/chronic iron deficiency anemia: Patient following with Dr. Ortega, continued chronic oral iron supplementation, continued patient home   
metoprolol as BP allows, rifaximin. NH level 34; however, given constipation   
complaint noted to Neurology per patient will administer lactulose 20 mg BID   
regimen to titrate bowel movements, will continue to re-evaluate and alter as   
needed. 2/8/24 limited abdominal US with noted ascites in all quadrants, 2/9/24   
paracentesis performed with 8200 cc fluid removed and diagnostic evaluation   
studies requested including cytology. Given amount removed IV albumin ~6 g/L   
administered with 50 gm 25% albumin administered.     
    
5.  Urinary retention with Davis catheter placement: 2/8/2024-2/9/2024 patient   
with significant urinary retention with straight cath attempts however   
eventually Davis catheter placement necessary, urinalysis obtained and   
unremarkable, Flomax started.  Will continue and make reattempts to de-escalate   
catheter however if continued require catheter may necessitate outpatient   
follow-up with urology.    
    
6.  PAF: Patient not chronically anticoagulated secondary to his underlying   
liver disease process with thrombocytopenia, maintained on diltiazem, encourage   
continued outpatient follow-up with cardiology as previously arranged.    
    
7.  Valvular heart disease: Patient status post AV bioprosthetic valve, echo   
7/23 with EF 60% with indeterminate diastolic function with severely dilated RV   
and global RV dysfunction, severe mitral annular calcification with RVSP 44   
mmHg.    
    
8.  Depression and anxiety: Will continue patient home sertraline regimen.    
    
9.  GERD: We will continue patient on PPI.    
    
10.  Gout: We will continue patient home allopurinol regimen.    
    
11.  CAREN: BiPAP nightly.    
    
12.  DVT prophylaxis: SCDs. Holding administration of any chemoprophylaxis   
concept given significant thrombocytopenia.    
    
13.  CODE STATUS: DNR-CCA, no intubation.    
    
Charges/Coding    
Visit Charges    
Inpatient E&M: 49516 Subs Hosp L3

## 2024-02-09 NOTE — CASEMGMT
ELDA called patient's daughter Dixie after reviewing PT/OT's evaluations. ELDA told Dixie that patient did not do well with therapy. Notes were sent to [facility], but [facility] has not heard back from them yet. ELDA asked Dixie if she would like ELDA to provide her 
with a list of skilled nursing facilities. Dixie said she would like to have patient go to [facility]. ELDA let Dixie know SW will make the referral. ELDA also let Dixie know if patient improves and does not need SNF this can always be canceled. Dixie 
thanked ELDA.

ELDA did make a referral to [facility].
Jessica Gibbs MSW RODNEY

## 2024-02-09 NOTE — CASEMGMT
TCU has accepted patient. ELDA called patient's daughter Dixie and notified her TCU can take patient and per physician patient may be ready on Sunday. Dixie thanked ELDA for the update. 

ELDA called [facility] as patient was there for over 20 days recently. Per [place] patient's secondary insurance did pay the co-pay.

Jessica Gibbs MSW RODNEY

## 2024-02-09 NOTE — NEURO.EEG
EEG Results
Procedure Details
EEG Procedure Details: 
EEG Report

 
Patient:  Mitesh Braun
YOB: 1940
Sex: Male
Age: 84 years old

Inpatient routine EEG report:

Study start time: 02/09/2024 02:39 PM
End Time: 02/09/2024 03:00 PM

History:  84 year old male with cirrhosis presented with waxing and waning mental status.
 
Indication:  rule out seizure

Technical Description: This is a 21-channel digital EEG recording with time-locked video and single-channel electrocardiogram. Electrodes are placed according to the 10 to 20 International System. Additional T1 and T2 electrodes were placed. The 
patient was monitored continuously by EEG technicians by video and EEG recording was reviewed intermittently with annotations to the EEG record made every two hours.  Portions of this record are reviewed using bandpass filters of 1 to 70 Hz and 
sensitivity of 7mV/mm. 
 

EEG DESCRIPTION
 
Background: This recording was obtained during awake and drowsy state. In the maximally alert state, a posterior dominant rhythm was absent.  During drowsiness, there was attenuation of the waking background. Stage II sleep architecture was not 
captured. Background was comprised of diffuse polymorphic delta slow activity
 

Activation Procedures: Photic stimulation did not induced normal physiological response. Hyperventilation was not performed.

Sporadic Epileptiform Discharges: none

Focal slow activity: none

Rhythmic or Periodic activity:  none
 
Seizures: none

Patient Events: none

EEG DIAGNOSIS
Diffuse slow activity, moderate in degree

 
CLINICAL INTERPRETATION
This routine EEG is indicative of moderate degree of encephalopathy of nonspecific etiology. No epileptiform activity or seizures were noted during this period of recording.


EEG read by: Annie Riley MD

---------------------------------------------
[facility], [facility], M.D.
Neurology

## 2024-02-09 NOTE — FLU_PTH
PATHOLOGY RESULTS

PATIENT: NENA MADRIGAL         ACCT #:[id number]  LOC: [facility]        U#:[id number]
                                       AGE/SX: 84/M         ROOM: [facility]    RE2024
REG DR: Dr. Pato Hancock DO        : 1940      BED: 1          DIS: 02/15/2024

--------------------------------------------------------------------------------------------

SPEC #: [id number]          RECD: 24 12:25    STATUS:  SOUDEEPA        REQ #: [id number]
                        RAKEL: 24 12:09    SUBM DR: Pebbles Schwartz

DEPT: CYTOLOGY                                  RECD BY: Odalys Mendoza

ENTERED: 24 13:10 SP TYPE: Fluid      OTHR DR: MD Dr. Dennys Seo MD Dr. Allison Jordan, DO Dr. Autumn L White, MD Dr. Alicia Zha, MD Dr. Drew Abramovich, MD Dr. Deepak Gulati, MD Dr. Elnora Spradling, MD Dr. Hera Kamdar, MD Dr. Jan Bittar, MD Dr. James Burke, MD Dr. Lapman Lun, MD Dr. Mir Ali, MD Dr. Matthew Gusler, MD Dr. Maryam Mian, MD Dr. Mohamed Ridha, MD Dr. Mhd Ezzat Zaghlouleh, MD Dr. Paul Masci, DO Dr. Paige Pierce, MD Dr. Peter Robinson, MD Dr. Sushil Lakhani, MD Dr. Vivien Lee, MD Dr. Victor Velasquez, MD
Tissues:    PARACENTESIS FLUID
Procedures: Special Stain Group II
            Surgery Specimen Level IV
            Cytospin Fluid
Comments:   @ Ordering doctor for SSII edited from  to   
            @ by JOANNE at 24 [id number]  
            @ Ordering doctor for SUIV edited from  to DR.PPIERC Brown RUBIO at 24 1359  
            @ Ordering doctor for CYSPIN edited from  to DR.PPIERC Brown RUBIO at 24 5349  
            @ Submitting doctor edited from  to DR.PPIERC Brown RUBIO at 24 1359  

--------------------------------------------------------------------------------------------

HEADER

OPERATION:  Ultrasound-guided paracentesis  
PRE-OP DIAGNOSIS:  Ascites  
TISSUE SUBMITTED:  Paracentesis fluid for cytology  

--------------------------------------------------------------------------------------------

DIAGNOSIS CYTOLOGY

Paracentesis fluid for cytology (cytospin and cell block):  
    Negative for malignant cells.  
    See comment.  
  
SJ:fatou  2024 

--------------------------------------------------------------------------------------------

COMMENT

Clinical correlation and appropriate follow up are necessary. 

--------------------------------------------------------------------------------------------

CYTOLOGY STUDY

Slides are reviewed.  

--------------------------------------------------------------------------------------------

CYTOLOGY GROSS

Received is 80 ml of yellow fluid labeled with the patient's name and  and designated per 
the requisition as  paracentesis.   Submitted for cytology preparation including cell block. 
 / fatou  2024  TC:5  
CPT: 08394, 44484

## 2024-02-10 VITALS
HEART RATE: 118 BPM | OXYGEN SATURATION: 98 % | SYSTOLIC BLOOD PRESSURE: 112 MMHG | DIASTOLIC BLOOD PRESSURE: 78 MMHG | RESPIRATION RATE: 16 BRPM | TEMPERATURE: 98.2 F

## 2024-02-10 VITALS — HEART RATE: 110 BPM

## 2024-02-10 VITALS
DIASTOLIC BLOOD PRESSURE: 66 MMHG | OXYGEN SATURATION: 99 % | RESPIRATION RATE: 18 BRPM | SYSTOLIC BLOOD PRESSURE: 102 MMHG | HEART RATE: 54 BPM | TEMPERATURE: 98.06 F

## 2024-02-10 VITALS — RESPIRATION RATE: 18 BRPM | HEART RATE: 55 BPM

## 2024-02-10 VITALS
HEART RATE: 88 BPM | SYSTOLIC BLOOD PRESSURE: 95 MMHG | TEMPERATURE: 97.8 F | DIASTOLIC BLOOD PRESSURE: 62 MMHG | OXYGEN SATURATION: 98 % | RESPIRATION RATE: 16 BRPM

## 2024-02-10 VITALS
DIASTOLIC BLOOD PRESSURE: 69 MMHG | RESPIRATION RATE: 18 BRPM | HEART RATE: 74 BPM | SYSTOLIC BLOOD PRESSURE: 102 MMHG | OXYGEN SATURATION: 96 % | TEMPERATURE: 98.06 F

## 2024-02-10 VITALS — SYSTOLIC BLOOD PRESSURE: 102 MMHG | HEART RATE: 74 BPM | DIASTOLIC BLOOD PRESSURE: 69 MMHG

## 2024-02-10 VITALS — HEART RATE: 101 BPM | RESPIRATION RATE: 20 BRPM

## 2024-02-10 LAB
ALANINE AMINOTRANSFER ALT/SGPT: 28 U/L (ref 16–61)
ALBUMIN SERPL-MCNC: 2.5 G/DL (ref 3.2–5)
ALKALINE PHOSPHATASE: 208 U/L (ref 45–117)
ANION GAP: 5 (ref 5–15)
AST(SGOT): 37 U/L (ref 15–37)
BUN SERPL-MCNC: 28 MG/DL (ref 7–18)
BUN/CREAT RATIO: 26.7 RATIO (ref 10–20)
CALCIUM SERPL-MCNC: 11.8 MG/DL (ref 8.5–10.1)
CARBON DIOXIDE: 26 MMOL/L (ref 21–32)
CHLORIDE: 115 MMOL/L (ref 98–107)
DEPRECATED RDW RBC: 61 FL (ref 35.1–43.9)
DIFFERENTIAL INDICATED: (no result)
ERYTHROCYTE [DISTWIDTH] IN BLOOD: 16.8 % (ref 11.6–14.6)
EST GLOM FILT RATE - AFR AMER: 87 ML/MIN (ref 60–?)
ESTIMATED CREATININE CLEARANCE: 66.07 ML/MIN
GLOBULIN: 2.7 G/DL (ref 2.2–4.2)
GLUCOSE: 97 MG/DL (ref 74–106)
HCT VFR BLD AUTO: 31 % (ref 40–54)
HGB BLD-MCNC: 10 G/DL (ref 13–16.5)
IMMATURE GRANULOCYTES COUNT: 0.01 X10^3/UL (ref 0–0)
MCV RBC: 101.3 FL (ref 80–94)
MEAN CORP HGB CONC: 32.3 G/DL (ref 32–36)
MEAN PLATELET VOL.: 11.2 FL (ref 6.2–12)
NRBC FLAGGED BY ANALYZER: 0 % (ref 0–5)
PLATELET # BLD: 51 K/MM3 (ref 150–450)
POSITIVE COUNT: YES
POSITIVE DIFFERENTIAL: YES
POTASSIUM: 3.4 MMOL/L (ref 3.5–5.1)
RBC # BLD AUTO: 3.06 M/MM3 (ref 4.6–6.2)
WBC # BLD AUTO: 4.7 K/MM3 (ref 4.4–11)

## 2024-02-10 RX ADMIN — PANTOPRAZOLE SODIUM 330 MG: 40 INJECTION, POWDER, FOR SOLUTION INTRAVENOUS at 21:09

## 2024-02-10 RX ADMIN — ALBUTEROL SULFATE 2.5 MG: 2.5 SOLUTION RESPIRATORY (INHALATION) at 07:15

## 2024-02-10 RX ADMIN — PIPERACILLIN SODIUM AND TAZOBACTAM SODIUM 12.5 GM: 3; .375 INJECTION, POWDER, LYOPHILIZED, FOR SOLUTION INTRAVENOUS at 04:33

## 2024-02-10 RX ADMIN — SODIUM CHLORIDE, PRESERVATIVE FREE 0 ML: 5 INJECTION INTRAVENOUS at 08:25

## 2024-02-10 RX ADMIN — TOLTERODINE TARTRATE 4 MG: 4 CAPSULE, EXTENDED RELEASE ORAL at 09:51

## 2024-02-10 RX ADMIN — PIPERACILLIN SODIUM AND TAZOBACTAM SODIUM 12.5 GM: 3; .375 INJECTION, POWDER, LYOPHILIZED, FOR SOLUTION INTRAVENOUS at 15:40

## 2024-02-10 RX ADMIN — ALBUTEROL SULFATE 2.5 MG: 2.5 SOLUTION RESPIRATORY (INHALATION) at 20:23

## 2024-02-10 RX ADMIN — SODIUM CHLORIDE, PRESERVATIVE FREE 0 ML: 5 INJECTION INTRAVENOUS at 09:58

## 2024-02-10 RX ADMIN — PIPERACILLIN SODIUM AND TAZOBACTAM SODIUM 12.5 GM: 3; .375 INJECTION, POWDER, LYOPHILIZED, FOR SOLUTION INTRAVENOUS at 21:09

## 2024-02-10 RX ADMIN — DEXTROSE MONOHYDRATE 30 ML: 50 INJECTION, SOLUTION INTRAVENOUS at 07:06

## 2024-02-10 RX ADMIN — OXYMETAZOLINE HYDROCHLORIDE 2 SPRAY: 0.05 SPRAY NASAL at 13:49

## 2024-02-10 RX ADMIN — PANTOPRAZOLE SODIUM 330 MG: 40 INJECTION, POWDER, FOR SOLUTION INTRAVENOUS at 14:35

## 2024-02-10 NOTE — PN.HOSP_ITS
Reason for Visit    
Reason for Visit:     
Diagnoses    
    
Anemia in other chronic diseases classified elsewhere (02/08/24)    
Obstructive sleep apnea (adult) (pediatric) (02/08/24)    
Encephalopathy, unspecified (02/08/24)    
Gastro-esophageal reflux disease without esophagitis (02/08/24)    
Unspecified cirrhosis of liver (02/08/24)    
Gout, unspecified (02/08/24)    
Other ascites (02/08/24)    
Other symptoms and signs involving the nervous system (02/08/24)    
Dependence on other enabling machines and devices (02/08/24)    
    
    
    
Subjective    
Subjective    
Patient with significant issues with nausea and vomiting overnight per   
discussion with staff and also poor toleration of pamidronate with significant   
tachycardia and generally not feeling well during it with improvement following   
completion of the regimen but then again onset of bouts of nausea and emesis.    
Patient did have 1 loose stool.  Discussed patient current status with him and   
his daughter and decision as noted to follow-up intractable nausea and emesis   
with KUB with no severe findings and CT abdomen pelvis with evidence of   
gastritis with ongoing treatments as noted.  Patient denies fevers, chills,   
abdominal pain despite his bouts of nausea/emesis, no chest pain or dyspnea.    
    
Objective Data    
Objective Data    
Vital Signs:     
Vital Signs    
    
    
    
Temp Pulse Resp BP Pulse Ox O2 Del Method O2 Flow Rate    
     
 97.8 F   88   16   95/62   98   Nasal Cannula   2     
     
 02/10/24 03:59  02/10/24 03:59  02/10/24 03:59  02/10/24 03:59  02/10/24 03:59   
02/10/24 03:59  02/10/24 03:59    
    
    
    
FiO2    
     
 2     
     
 02/09/24 12:41    
    
    
    
    
Oxygen Flow Rate (L/min)         2                                                
       
Oxygen Delivery Method           Nasal Cannula                                    
       
Weight:                          250 lb 3.594 oz                                  
       
Body Mass Index (BMI)            35.9                                             
       
    
    
Intake & Output:     
                       Intake and Output for Last 24 Hours    
    
    
    
 02/08/24 02/09/24 02/10/24    
    
 23:59 23:59 23:59    
     
Intake Total 2070 / 2070 1071.9167 / 1171.9167 150 / 150    
     
Output Total 1325 / 1475 9800 / 79495 950 / 950    
     
Balance 745 / 595 -8728.0833 / -8978.0833 -800 / -800    
    
    
    
Lab / Micro Data    
    
                                                        02/10/24 04:16              
    
                                                        02/10/24 04:16              
    
Labs:     
                         Laboratory Results - last 24 hr    
    
02/08/24 07:10: Diff Path Review Reviewed    
02/09/24 05:45: WBC 5.6, RBC 3.25 L, Hgb 10.8 L, Hct 32.9 L, .2 H, MCH   
33.2 H, MCHC 32.8, RDW Std Deviation 62.3 H, RDW Coeff of Ilia 16.8 H, Plt Count   
58 L, MPV 11.3, Immature Gran % (Auto) 0.400, Neut % (Auto) 90.0 H, Lymph %   
(Auto) 3.2 L, Mono % (Auto) 4.1, Eos % (Auto) 2.1, Baso % (Auto) 0.2, Absolute   
Neuts (auto) 5.1, Absolute Lymphs (auto) 0.18 L, Nucleated RBC % 0, Sodium 144,   
Potassium 3.7, Chloride 114 H, Carbon Dioxide 27.0, Anion Gap 3 L, BUN 33 H,   
Creatinine 1.07, Estim Creat Clear Calc 64.98, Est GFR (MDRD) Af Amer 85, Est   
GFR (MDRD) Non-Af 70, BUN/Creatinine Ratio 30.8 H, Glucose 76, Calcium 11.6 H,   
Total Bilirubin 0.80, AST 34, ALT 29, Alkaline Phosphatase 187 H, Total Protein   
5.2 L, Albumin 2.1 L, Globulin 3.1, Albumin/Globulin Ratio 0.7 L, Vitamin D 25-  
Hydroxy 51.8, PTH Intact 109.1 H    
02/09/24 08:15: PT 15.2 H, INR 1.2, APTT 33.1    
02/09/24 12:07: Fluid Source OTHER, Fluid Color YELLOW, Fluid Appearance CLEAR,   
Fluid WBC 0.055, Fluid , Fluid Tot Cell Count 0.061, Fld Polynuclear WBCs  
# 0.025, Fld Polynuclear WBCs % 45.5, Fluid Mononuclear WBCs 0.030, Fld   
Mononuclear WBCs % 54.5, Fluid Neutrophils 38, Fluid Lymphocytes 14, Fluid   
Monocytes 1, Fluid Macrophages 47, Fl Pathologist Comment May follow, Fluid   
Glucose 91 H, Fluid Total Protein 1.8, Fluid LDH 39, Fluid Comment 2 SEE COMMENT    
02/10/24 04:16: WBC 4.7, RBC 3.06 L, Hgb 10.0 L, Hct 31.0 L, .3 H, MCH   
32.7 H, MCHC 32.3, RDW Std Deviation 61.0 H, RDW Coeff of Ilia 16.8 H, Plt Count   
51 L, MPV 11.2, Immature Gran % (Auto) 0.200, Neut % (Auto) 88.7 H, Lymph %   
(Auto) 3.9 L, Mono % (Auto) 4.7, Eos % (Auto) 1.9, Baso % (Auto) 0.6, Absolute   
Neuts (auto) 4.1, Absolute Lymphs (auto) 0.18 L, Nucleated RBC % 0, Sodium 146 H  
, Potassium 3.4 L, Chloride 115 H, Carbon Dioxide 26.0, Anion Gap 5, BUN 28 H,   
Creatinine 1.05, Estim Creat Clear Calc 66.07, Est GFR (MDRD) Af Amer 87, Est   
GFR (MDRD) Non-Af 72, BUN/Creatinine Ratio 26.7 H, Glucose 97, Calcium 11.8 H,   
Total Bilirubin 1.10 H, AST 37, ALT 28, Alkaline Phosphatase 208 H, Total   
Protein 5.2 L, Albumin 2.5 L, Globulin 2.7, Albumin/Globulin Ratio 0.9    
    
    
Micro:     
                                  Microbiology    
    
02/08/24 16:11   Urine Catheter - Catheter   Legionella Antigen - Final    
02/08/24 16:11   Urine Catheter - Catheter   Streptococcus pneumoniae Antigen (M  
- Final    
02/07/24 15:15   Mucosa - Nose   SARS-CoV-2, Influenza & RSV (PCR) - Final    
    
    
    
Physical Exam    
Narrative    
Physical Examination:    
General: Awake, alert, oriented to self, place, recent events, remains   
cooperative, fatigued, discussed his recent episodes of nausea and emesis and   
plan for further evaluation.    
Skin: Normal color, normal turgor, no icterus, no cyanosis except for occasional  
staged ecchymoses, stasis disease.    
HEENT: AT/NC, EOMI, PERRLA, dry MM.    
Lungs: Diminished, diffusely, greater posterior, mildly decreased effort, no   
evidence of any distress, no rales, ronchi or wheezing.    
Heart: Regular rate and rhythm; no gallop, rub audible.    
Abdomen: Soft, obese, status post paracentesis, nondistended appearing, unable   
to elicit any discomfort with palpation, mildly hyperactive BS.    
Extremities: No cyanosis, no clubbing, bilateral lower extremity not markedly   
pitting ankle edema.    
Neurological: Patient awake, alert, oriented as noted, cognitive function   
intact; pupils equally reactive to light and accommodation, cranial nerves   
grossly normal, moving all 4 extremities except significant left-sided   
hemiplegia left lower extremity, strength worsened, moderately to severely   
globally decreased.    
Psychiatric: Affect appears fatigued, no acute evidence of depressive or anxiety  
feelings.    
    
Assessment & Plan    
Assessment/Plan    
(1) Encephalopathy acute:     
PLAN:     
Plan    
The patient is an 83 y/o M w/ PMHx: Chronic anemia/AOCD, CKD stage III unclear   
subtype, HTN, HLD, Hx CVA, Anxiety an Depression, Asthma, Valvular Heart D  
isease, PAF/Flutter, Nonobstructive CAD, CAREN on BIPAP, Non-alcoholic Liver   
Cirrhosis w/ Liver cancer w/ associated chronic thrombocytopenia/anemia/mild   
hyperammonemia, Obesity who presents to the [facility] ED on 2/7/24 with history of   
worsening fatigue, malaise, debility and weakness with chronic left lower   
extremity debilities following previous stroke but worse with increased lethargy  
prompting family to bring him into the ED for evaluation with initial concern   
for possible stroke.    
    
1.  Acute Encephalopathy/Metabolic/Infectious, Adult FTT, multifactorial as   
noted also #2, #3, increased debility/weakness above baseline w/ Possible RUL   
Pneumonia/RUL Consolidation with possible gram-negative versus gram-positive   
organism versus Possible Metastatic Disease: Admission labs included CBC with   
WBC 4.9, N 110.7, .3, platelet 58 with lymphopenia, CMP with chloride   
110, BUN/creatinine 40/1.19, glucose 119, calcium 12.5, hepatic profile with alk  
phos 156, ammonia 34, .6, troponin 24, chest x-ray with blunting right   
costophrenic angle with bibasilar atelectasis with increased markings right   
upper lobe abutting the right minor fissure of unclear significance, possibly   
atelectasis versus infiltrate, CT brain with chronic involutional changes, CTA   
head and neck with no significant major vaso-occlusive disease in the head or   
neck, evidence of a right upper lobe consolidation, MRI of the brain with   
chronic involutional changes with no acute intracranial findings. Admitted to   
PCU, given MRI without acute CVA suspect process in RUL likely associated with   
encephalopathy and increased weakness, maintained on oxygen with wean as   
tolerated to room air, continue ATC budesonide, PRN albuterol, initiated given   
history of recent hospitalization within the 90-day leslee on IV Zosyn and Vanc  
omycin however negative MRSA screen thus de-escalated off Vanc therapy. Negative  
urine antigens. Procalcitonin 0.17.  Bld cx x 2 obtained in the ED. PT/OT/ST/CM   
consulted for discharge planning. Given history will need to consider repeat   
dedicated CT following this treatment to assure not in fact metastatic disease   
component.    
    
2.  Increased left lower extremity weakness above baseline with encephalopathy,   
stroke ruled out, likely secondary to #1 as noted: Upon admission concern for   
initially recurrent stroke with baseline left-sided hemiplegia but worsened, CT   
brain with chronic involutional changes, CTA head and neck with no significant   
major vaso-occlusive disease in the head or neck, evidence of a right upper lobe  
consolidation, MRI of the brain with chronic involutional changes with no acute   
intracranial findings with stroke ruled out, allergy to statin with significant   
thrombocytopenia thus antiplatelet deferred and lieu suspicion for stroke upon   
admission, echocardiogram with EF 55%, moderately dilated RV, mild to moderate   
global RV systolic dysfunction, severe biatrial dilatation, mild TVI,   
bioprosthetic aortic valve with mean gradient across the valve 6.7 mmHg, bubble   
contrast study negative.  Given no evidence of stroke following MRI completion   
hypertensive regimen restarted. [facility] consulted and at this point   
they do believe that his presentation is likely infectious secondary to his new   
pneumonia however believe it could be related with his cirrhotic disease and   
possibly also hypercalcemia w/ recommendation of titrating bowel movements 2-3   
daily w/ bowel regimen started; however, abdominal likely distended secondary to  
ascites confirmed on limited 2/8/24 US, 2/9/24 paracentesis performed with 8200   
cc fluid removed and diagnostic evaluation studies requested including cytology.  
Given amount removed IV albumin ~6 g/L administered with 50 gm 25% albumin   
administered. Neurology also although lower suspicion recommended EEG which has   
also been performed 2/9/24 w/ diffuse slow activity, moderate in degree. ST   
evaluation for moderate oropharyngeal dysphagia w/ Mechanical Soft Textures   
(Soft and bite size textures IDDSI Level 6) and Nectar-thick Liquids w/ Small   
Bites, Small Sips, Slow Rate, Alternate bites/solids and sips/liquids, Sitting   
upright and Remain sitting upright for 30 minutes after PO intake. ST   
recommendation for GI Consult outpatient for barium evaluation lower esophagus.   
As noted #3, thus temporarily oral intake aside medications held.    
    
3.  New onset intractable nausea and emesis, suspected Acute Gastritis, possible  
component Colitis: Onset 2/9/24-20/10/24 bouts intractable nausea, emesis.    
Discussed with staff and holding usage of CPAP for aspiration risk. 2/10/24 KUB   
Smooth appearance of the colonic wall, possible colitis. Mild gaseous distention  
and oral contrast in the colon. Nonobstructive bowel gas pattern. 2/10/24 placed  
NG given ongoing N/V. NPO status transitioned to IV PPI. CT A/P obtained w/   
noted moderate hiatal hernia with diffuse gastric wall thickening concerning for  
gastritis, cirrhotic liver with a 1.2 cm indeterminate mass at the dome with is   
known from prior, moderate right and mild left pleural effusions with bilateral   
lower lobe atelectasis. Moderate ascites, varices, mild splenomegaly, evidence   
of cholelithiasis, multiple renal cysts, colonic diverticulosis without acute   
diverticulitis. As noted gastritis and colitis concerns, will obtain c-diff and   
enteric if onset diarrhea, held bowel regimen, maintained on IV PPI, continued   
on IV zosyn as noted.    
    
4.  Hypercalcemia: Likely contributing to his weakness and altered mental status  
most probably secondary to underlying malignancy, has been previously been   
higher. Admission Ca12.5, ICa elevated 6.38 (4.26-5.20 range), will obtain PTH,   
vitamin D 1,25-OH2 and vitamin D 25-OH, initially given judicious IVFs but given  
ascites stopped, 2/9/24 administered lasix 40 mg IV x 1, 2/10/24 corrected Ca++   
level 13.2, 2/10/24 administered pamidronate 60 mg x 1, 2/10/24 Ca 11.8,   
unfortunately patient had tachycardia, nausea with the infusion and VS improved   
immediately once infusion completed. He since has had nausea, emesis bouts   
overnight, possibly related with this medication, will hold additional, KUB   
resulted as noted with follow-up CT A/P as noted.    
    
5.  Hypokalemia: Admission K+ 3.4, recent magnesium level 2.3, repeat level in   
AM. Currently given his acute nausea/emesis holding oral regimen for now, giving  
judicious IVFs and once improved will supplement. 2/10/24 K+ 3.4 with   
supplementation given but unclear how much absorbed given N/V. Will repeat level  
in AM.    
    
6.  Nonalcoholic cirrhosis with underlying liver cancer with associated   
pancytopenia, chronic/chronic iron deficiency anemia: Patient following with Dr. Ortega, continued chronic oral iron supplementation, continued patient home   
metoprolol as BP allows, rifaximin. NH level 34; however, given constipation   
complaint noted to Neurology per patient will administer lactulose 20 mg BID   
regimen to titrate bowel movements, will continue to re-evaluate and alter as   
needed. 2/8/24 limited abdominal US with noted ascites in all quadrants. 2/9/24   
paracentesis performed with 8200 cc fluid removed and diagnostic evaluation   
studies requested including cytology. Given amount removed IV albumin ~6 g/L   
administered with 50 gm 25% albumin administered. Discussed case with Dr. Ortega   
and his service will see on Monday per patient request.     
    
7.  Urinary retention with Love catheter placement: 2/8/2024-2/9/2024 patient   
with significant urinary retention with straight cath attempts however   
eventually Love catheter placement necessary, urinalysis obtained and   
unremarkable, Flomax started.  Will continue and make reattempts to de-escalate   
catheter however if continued require catheter may necessitate outpatient   
follow-up with urology.    
    
8.  PAF: Patient not chronically anticoagulated secondary to his underlying   
liver disease process with thrombocytopenia, maintained on diltiazem, encourage   
continued outpatient follow-up with cardiology as previously arranged.    
    
9.  Valvular heart disease: Patient status post AV bioprosthetic valve, echo   
7/23 with EF 60% with indeterminate diastolic function with severely dilated RV   
and global RV dysfunction, severe mitral annular calcification with RVSP 44   
mmHg.    
    
10.  Depression and anxiety: Will continue patient home sertraline regimen.    
    
11.  GERD: We will continue patient on PPI.    
    
12.  Gout: We will continue patient home allopurinol regimen.    
    
13.  CAREN: BiPAP nightly.    
    
14.  DVT prophylaxis: SCDs. Holding administration of any chemoprophylaxis   
concept given significant thrombocytopenia.    
    
15.  CODE STATUS: DNR-CCA, no intubation.    
    
Charges/Coding    
Visit Charges    
Inpatient E&M: [id number] [facility] L[facility]

## 2024-02-10 NOTE — CT_ITS
REPORT-ID:[id number] 
 
HISTORY: Intractable N/V. 
 
TECHNIQUE: Helically acquired images were obtained of the abdomen and 
pelvis after the intravenous administration of 100 mL Isovue-370. A 
radiation dose optimization technique was used for this scan. 962 images. 
COMPARISON: XR same day. 
 
FINDINGS: 
 
LOWER CHEST: Moderate right and mild left pleural effusions with mild 
dependent lower lobe opacities. Old right seventh rib fracture. Midline 
sternotomy with aortic valve replacement. 
BOWEL: Moderate hiatal hernia with moderate diffuse gastric wall 
thickening.  Bowel including appendix nondilated. Oral contrast in the 
colon without definite wall thickening. Colonic diverticulosis with limited 
evaluation for acute diverticulitis due to generalized intra-abdominal 
edema and ascites. 
PERITONEUM: Moderate ascites. Varices. 
LIVER: Nodular contour with a 12 mm hypoenhancing lesion at the dome. 
GALLBLADDER/BILIARY TREE: Small calcified gallstones. 
SPLEEN: 15 cm in length. 
PANCREAS/ADRENAL GLANDS: Homogeneous and nonenlarged. 
KIDNEYS:  No hydronephrosis. Multiple cysts measuring up to 7.4 x 7.8 cm on 
the right with thin septation and 7.8 cm on the right. Small left renal 
cyst with thin mural calcification. Mild left renal cortical thinning. 
 
VESSELS: No abdominal aortic aneurysm. Atherosclerosis of the abdominal 
aorta and its major branches. 
PELVIC ORGANS: Love catheter decompression of the bladder. 
ABDOMINAL WALL: Moderate subcutaneous edema. 
BONES: Gamma nail fixation of chronic left femoral trochanteric fracture. 
Degenerative change. 
 
ORDER #: [id number] CT/Abdomen/Pelvis W IV Cont ONLY  
IMPRESSION:  
 
  
Moderate hiatal hernia with diffuse gastric wall thickening concerning for  
gastritis.  
Cirrhotic liver with a 1.2 cm indeterminate mass at the dome; recommend  
follow-up multiphasic MRI or CT to assess for neoplasm.  
Moderate right and mild left pleural effusions with bilateral lower lobe  
atelectasis. Moderate ascites. Varices.  
Mild splenomegaly. Cholelithiasis.  
Multiple renal cysts.  
Colonic diverticulosis without acute diverticulitis.  
 
  
Electronically Signed:  
Lorie Patel MD  
2024/02/10 at 13:32 EST  
Reading Location ID and State: [address] / [place]  
Tel [phone], Service support  [phone], Fax [phone]

## 2024-02-10 NOTE — RAD_ITS
REPORT-ID:[id number] 
 
STUDY:   X-RAY - ABDOMEN/PELVIS 
 
REASON FOR EXAM:   Male, 84 years old.  verify NG placement 
 
TECHNIQUE:   Single AP view of the abdomen / pelvis. 
 
COMPARISON:   2/10/2024, 3:12 PM. 
___________________________________ 
 
FINDINGS: 
Nasogastric tube is not seen below the thoracic inlet. Nasogastric tube is 
probably coiled in the oropharynx which is often field-of-view. Once again 
recommend removal and repositioning. 
Low lung volumes with atelectasis or infiltrate in both lungs, worse on the 
right, stable. 
 
ORDER #: [id number] RAD/Abdomen Single View  
IMPRESSION:  
Continued malposition nasogastric tube probably coiled in the oropharynx.  
 
  
Electronically Signed:  
Fernandez Shane MD  
2024/02/10 at 19:29 EST  
Reading Location ID and State: [address] / [place]  
Tel [phone], Service support  [phone], Fax [phone]

## 2024-02-10 NOTE — RAD_ITS
REPORT-ID:[id number] 
 
HISTORY: nausea/emesis. 
 
TECHNIQUE: XR Abdomen 1 View. 
COMPARISON: None. 
 
FINDINGS: 
 
BOWEL GAS PATTERN: Gaseous distention of the colon with enteric contrast in 
the ascending transverse, and descending colon. Residual enteric contrast 
in nondilated ileum. Featureless large bowel. 
FREE AIR: Not assessed on supine view. 
CALCIFICATIONS: Pelvic phleboliths observed. 
BONES: Gamma nail fixation of chronic left femur fracture. 
 
ORDER #: [id number] RAD/Abdomen Single View (Portable)  
IMPRESSION:  
 
  
Smooth appearance of the colonic wall, possible colitis. Mild gaseous  
distention and oral contrast in the colon. Nonobstructive bowel gas  
pattern.  
 
  
Electronically Signed:  
Lorie Patel MD  
2024/02/10 at 9:05 EST  
Reading Location ID and State: [address] / [place]  
Tel [phone], Service support  [phone], Fax [phone]

## 2024-02-10 NOTE — RAD_ITS
REPORT-ID:[id number] 
 
STUDY:   X-RAY - ABDOMEN/PELVIS 
 
REASON FOR EXAM:   Male, 84 years old.  KUB with both diaphragms for NG/OG 
Verification 
 
TECHNIQUE:   Single AP view of the abdomen / pelvis. 
 
COMPARISON:   Same day 7:20 AM. 
___________________________________ 
 
FINDINGS: 
Bilateral pulmonary opacities are seen much worse on the right. 
 
Nasogastric tube is coiled back on itself in the upper esophagus and needs 
to be repositioned. 
 
There is an unremarkable bowel gas pattern.  There is no demonstrated free 
abdominal air. 
 
Electronically Signed: 
Fernandez Shane MD 
2024/02/10 at 17:39 EST 
Reading Location ID and State: [address] / [place] 
Tel [phone], Service support  [phone], Fax [phone] 
 
ORDER #: [id number] RAD/Abdomen Single View (Portable)  
IMPRESSION: undefined

## 2024-02-10 NOTE — PCM.PN.HOSP
Reason for Visit
Reason for Visit: 
Diagnoses

Anemia in other chronic diseases classified elsewhere (02/08/24)
Obstructive sleep apnea (adult) (pediatric) (02/08/24)
Encephalopathy, unspecified (02/08/24)
Gastro-esophageal reflux disease without esophagitis (02/08/24)
Unspecified cirrhosis of liver (02/08/24)
Gout, unspecified (02/08/24)
Other ascites (02/08/24)
Other symptoms and signs involving the nervous system (02/08/24)
Dependence on other enabling machines and devices (02/08/24)



Subjective
Subjective
Patient with significant issues with nausea and vomiting overnight per discussion with staff and also poor toleration of pamidronate with significant tachycardia and generally not feeling well during it with improvement following completion of the 
regimen but then again onset of bouts of nausea and emesis.  Patient did have 1 loose stool.  Discussed patient current status with him and his daughter and decision as noted to follow-up intractable nausea and emesis with KUB with no severe 
findings and CT abdomen pelvis with evidence of gastritis with ongoing treatments as noted.  Patient denies fevers, chills, abdominal pain despite his bouts of nausea/emesis, no chest pain or dyspnea.

Objective Data
Objective Data
Vital Signs: 
Vital Signs

Temp Pulse Resp BP Pulse Ox O2 Del Method O2 Flow Rate
 97.8 F   88   16   95/62   98   Nasal Cannula   2 
 02/10/24 03:59  02/10/24 03:59  02/10/24 03:59  02/10/24 03:59  02/10/24 03:59  02/10/24 03:59  02/10/24 03:59
FiO2
 2 
 02/09/24 12:41



Oxygen Flow Rate (L/min)       2                                                
Oxygen Delivery Method         Nasal Cannula                                    
Weight:                        250 lb 3.594 oz                                  
Body Mass Index (BMI)          35.9                                             


Intake & Output: 
Intake and Output for Last 24 Hours

 02/08/24 02/09/24 02/10/24
 23:59 23:59 23:59
Intake Total 2070 / 2070 1071.9167 / 1171.9167 150 / 150
Output Total 1325 / 1475 9800 / 26768 950 / 950
Balance 745 / 595 -8728.0833 / -8978.0833 -800 / -800


Lab / Micro Data

02/10/24 04:16          

02/10/24 04:16          

Labs: 
Laboratory Results - last 24 hr

02/08/24 07:10: Diff Path Review Reviewed
02/09/24 05:45: WBC 5.6, RBC 3.25 L, Hgb 10.8 L, Hct 32.9 L, .2 H, MCH 33.2 H, MCHC 32.8, RDW Std Deviation 62.3 H, RDW Coeff of Ilia 16.8 H, Plt Count 58 L, MPV 11.3, Immature Gran % (Auto) 0.400, Neut % (Auto) 90.0 H, Lymph % (Auto) 3.2 L, 
Mono % (Auto) 4.1, Eos % (Auto) 2.1, Baso % (Auto) 0.2, Absolute Neuts (auto) 5.1, Absolute Lymphs (auto) 0.18 L, Nucleated RBC % 0, Sodium 144, Potassium 3.7, Chloride 114 H, Carbon Dioxide 27.0, Anion Gap 3 L, BUN 33 H, Creatinine 1.07, Estim 
Creat Clear Calc 64.98, Est GFR (MDRD) Af Amer 85, Est GFR (MDRD) Non-Af 70, BUN/Creatinine Ratio 30.8 H, Glucose 76, Calcium 11.6 H, Total Bilirubin 0.80, AST 34, ALT 29, Alkaline Phosphatase 187 H, Total Protein 5.2 L, Albumin 2.1 L, Globulin 3.1, 
Albumin/Globulin Ratio 0.7 L, Vitamin D 25-Hydroxy 51.8, PTH Intact 109.1 H
02/09/24 08:15: PT 15.2 H, INR 1.2, APTT 33.1
02/09/24 12:07: Fluid Source OTHER, Fluid Color YELLOW, Fluid Appearance CLEAR, Fluid WBC 0.055, Fluid , Fluid Tot Cell Count 0.061, Fld Polynuclear WBCs # 0.025, Fld Polynuclear WBCs % 45.5, Fluid Mononuclear WBCs 0.030, Fld Mononuclear WBCs 
% 54.5, Fluid Neutrophils 38, Fluid Lymphocytes 14, Fluid Monocytes 1, Fluid Macrophages 47, Fl Pathologist Comment May follow, Fluid Glucose 91 H, Fluid Total Protein 1.8, Fluid LDH 39, Fluid Comment 2 SEE COMMENT
02/10/24 04:16: WBC 4.7, RBC 3.06 L, Hgb 10.0 L, Hct 31.0 L, .3 H, MCH 32.7 H, MCHC 32.3, RDW Std Deviation 61.0 H, RDW Coeff of Ilia 16.8 H, Plt Count 51 L, MPV 11.2, Immature Gran % (Auto) 0.200, Neut % (Auto) 88.7 H, Lymph % (Auto) 3.9 L, 
Mono % (Auto) 4.7, Eos % (Auto) 1.9, Baso % (Auto) 0.6, Absolute Neuts (auto) 4.1, Absolute Lymphs (auto) 0.18 L, Nucleated RBC % 0, Sodium 146 H, Potassium 3.4 L, Chloride 115 H, Carbon Dioxide 26.0, Anion Gap 5, BUN 28 H, Creatinine 1.05, Estim 
Creat Clear Calc 66.07, Est GFR (MDRD) Af Amer 87, Est GFR (MDRD) Non-Af 72, BUN/Creatinine Ratio 26.7 H, Glucose 97, Calcium 11.8 H, Total Bilirubin 1.10 H, AST 37, ALT 28, Alkaline Phosphatase 208 H, Total Protein 5.2 L, Albumin 2.5 L, Globulin 
2.7, Albumin/Globulin Ratio 0.9


Micro: 
Microbiology

02/08/24 16:11   Urine Catheter - Catheter   Legionella Antigen - Final
02/08/24 16:11   Urine Catheter - Catheter   Streptococcus pneumoniae Antigen (M - Final
02/07/24 15:15   Mucosa - Nose   SARS-CoV-2, Influenza & RSV (PCR) - Final



Physical Exam
Narrative
Physical Examination:
General: Awake, alert, oriented to self, place, recent events, remains cooperative, fatigued, discussed his recent episodes of nausea and emesis and plan for further evaluation.
Skin: Normal color, normal turgor, no icterus, no cyanosis except for occasional staged ecchymoses, stasis disease.
HEENT: AT/NC, EOMI, PERRLA, dry MM.
Lungs: Diminished, diffusely, greater posterior, mildly decreased effort, no evidence of any distress, no rales, ronchi or wheezing.
Heart: Regular rate and rhythm; no gallop, rub audible.
Abdomen: Soft, obese, status post paracentesis, nondistended appearing, unable to elicit any discomfort with palpation, mildly hyperactive BS.
Extremities: No cyanosis, no clubbing, bilateral lower extremity not markedly pitting ankle edema.
Neurological: Patient awake, alert, oriented as noted, cognitive function intact; pupils equally reactive to light and accommodation, cranial nerves grossly normal, moving all 4 extremities except significant left-sided hemiplegia left lower 
extremity, strength worsened, moderately to severely globally decreased.
Psychiatric: Affect appears fatigued, no acute evidence of depressive or anxiety feelings.

Assessment & Plan
Assessment/Plan
(1) Encephalopathy acute: 
PLAN: 
Plan
The patient is an 85 y/o M w/ PMHx: Chronic anemia/AOCD, CKD stage III unclear subtype, HTN, HLD, Hx CVA, Anxiety an Depression, Asthma, Valvular Heart Disease, PAF/Flutter, Nonobstructive CAD, CAREN on BIPAP, Non-alcoholic Liver Cirrhosis w/ Liver 
cancer w/ associated chronic thrombocytopenia/anemia/mild hyperammonemia, Obesity who presents to the [facility] ED on 2/7/24 with history of worsening fatigue, malaise, debility and weakness with chronic left lower extremity debilities following previous 
stroke but worse with increased lethargy prompting family to bring him into the ED for evaluation with initial concern for possible stroke.

1.  Acute Encephalopathy/Metabolic/Infectious, Adult FTT, multifactorial as noted also #2, #3, increased debility/weakness above baseline w/ Possible RUL Pneumonia/RUL Consolidation with possible gram-negative versus gram-positive organism versus 
Possible Metastatic Disease: Admission labs included CBC with WBC 4.9, N 110.7, .3, platelet 58 with lymphopenia, CMP with chloride 110, BUN/creatinine 40/1.19, glucose 119, calcium 12.5, hepatic profile with alk phos 156, ammonia 34, BNP 
104.6, troponin 24, chest x-ray with blunting right costophrenic angle with bibasilar atelectasis with increased markings right upper lobe abutting the right minor fissure of unclear significance, possibly atelectasis versus infiltrate, CT brain 
with chronic involutional changes, CTA head and neck with no significant major vaso-occlusive disease in the head or neck, evidence of a right upper lobe consolidation, MRI of the brain with chronic involutional changes with no acute intracranial 
findings. Admitted to PCU, given MRI without acute CVA suspect process in RUL likely associated with encephalopathy and increased weakness, maintained on oxygen with wean as tolerated to room air, continue ATC budesonide, PRN albuterol, initiated 
given history of recent hospitalization within the 90-day leslee on IV Zosyn and Vancomycin however negative MRSA screen thus de-escalated off Vanc therapy. Negative urine antigens. Procalcitonin 0.17.  Bld cx x 2 obtained in the ED. PT/OT/ST/CM 
consulted for discharge planning. Given history will need to consider repeat dedicated CT following this treatment to assure not in fact metastatic disease component.

2.  Increased left lower extremity weakness above baseline with encephalopathy, stroke ruled out, likely secondary to #1 as noted: Upon admission concern for initially recurrent stroke with baseline left-sided hemiplegia but worsened, CT brain with 
chronic involutional changes, CTA head and neck with no significant major vaso-occlusive disease in the head or neck, evidence of a right upper lobe consolidation, MRI of the brain with chronic involutional changes with no acute intracranial 
findings with stroke ruled out, allergy to statin with significant thrombocytopenia thus antiplatelet deferred and lieu suspicion for stroke upon admission, echocardiogram with EF 55%, moderately dilated RV, mild to moderate global RV systolic 
dysfunction, severe biatrial dilatation, mild TVI, bioprosthetic aortic valve with mean gradient across the valve 6.7 mmHg, bubble contrast study negative.  Given no evidence of stroke following MRI completion hypertensive regimen restarted. [facility] 
teleneurology consulted and at this point they do believe that his presentation is likely infectious secondary to his new pneumonia however believe it could be related with his cirrhotic disease and possibly also hypercalcemia w/ recommendation of 
titrating bowel movements 2-3 daily w/ bowel regimen started; however, abdominal likely distended secondary to ascites confirmed on limited 2/8/24 US, 2/9/24 paracentesis performed with 8200 cc fluid removed and diagnostic evaluation studies 
requested including cytology. Given amount removed IV albumin ~6 g/L administered with 50 gm 25% albumin administered. Neurology also although lower suspicion recommended EEG which has also been performed 2/9/24 w/ diffuse slow activity, moderate in 
degree. ST evaluation for moderate oropharyngeal dysphagia w/ Mechanical Soft Textures (Soft and bite size textures IDDSI Level 6) and Nectar-thick Liquids w/ Small Bites, Small Sips, Slow Rate, Alternate bites/solids and sips/liquids, Sitting 
upright and Remain sitting upright for 30 minutes after PO intake. ST recommendation for GI Consult outpatient for barium evaluation lower esophagus. As noted #3, thus temporarily oral intake aside medications held.

3.  New onset intractable nausea and emesis, suspected Acute Gastritis, possible component Colitis: Onset 2/9/24-20/10/24 bouts intractable nausea, emesis.  Discussed with staff and holding usage of CPAP for aspiration risk. 2/10/24 KUB Smooth 
appearance of the colonic wall, possible colitis. Mild gaseous distention and oral contrast in the colon. Nonobstructive bowel gas pattern. 2/10/24 placed NG given ongoing N/V. NPO status transitioned to IV PPI. CT A/P obtained w/ noted moderate 
hiatal hernia with diffuse gastric wall thickening concerning for gastritis, cirrhotic liver with a 1.2 cm indeterminate mass at the dome with is known from prior, moderate right and mild left pleural effusions with bilateral lower lobe atelectasis. 
Moderate ascites, varices, mild splenomegaly, evidence of cholelithiasis, multiple renal cysts, colonic diverticulosis without acute diverticulitis. As noted gastritis and colitis concerns, will obtain c-diff and enteric if onset diarrhea, held 
bowel regimen, maintained on IV PPI, continued on IV zosyn as noted.

4.  Hypercalcemia: Likely contributing to his weakness and altered mental status most probably secondary to underlying malignancy, has been previously been higher. Admission Ca12.5, ICa elevated 6.38 (4.26-5.20 range), will obtain PTH, vitamin D 
1,25-OH2 and vitamin D 25-OH, initially given judicious IVFs but given ascites stopped, 2/9/24 administered lasix 40 mg IV x 1, 2/10/24 corrected Ca++ level 13.2, 2/10/24 administered pamidronate 60 mg x 1, 2/10/24 Ca 11.8, unfortunately patient had 
tachycardia, nausea with the infusion and VS improved immediately once infusion completed. He since has had nausea, emesis bouts overnight, possibly related with this medication, will hold additional, KUB resulted as noted with follow-up CT A/P as 
noted.

5.  Hypokalemia: Admission K+ 3.4, recent magnesium level 2.3, repeat level in AM. Currently given his acute nausea/emesis holding oral regimen for now, giving judicious IVFs and once improved will supplement. 2/10/24 K+ 3.4 with supplementation 
given but unclear how much absorbed given N/V. Will repeat level in AM.

6.  Nonalcoholic cirrhosis with underlying liver cancer with associated pancytopenia, chronic/chronic iron deficiency anemia: Patient following with Dr. Ortega, continued chronic oral iron supplementation, continued patient home metoprolol as BP 
allows, rifaximin. NH level 34; however, given constipation complaint noted to Neurology per patient will administer lactulose 20 mg BID regimen to titrate bowel movements, will continue to re-evaluate and alter as needed. 2/8/24 limited abdominal 
US with noted ascites in all quadrants. 2/9/24 paracentesis performed with 8200 cc fluid removed and diagnostic evaluation studies requested including cytology. Given amount removed IV albumin ~6 g/L administered with 50 gm 25% albumin administered. 
Discussed case with Dr. Ortega and his service will see on Monday per patient request. 

7.  Urinary retention with Love catheter placement: 2/8/2024-2/9/2024 patient with significant urinary retention with straight cath attempts however eventually Love catheter placement necessary, urinalysis obtained and unremarkable, Flomax 
started.  Will continue and make reattempts to de-escalate catheter however if continued require catheter may necessitate outpatient follow-up with urology.

8.  PAF: Patient not chronically anticoagulated secondary to his underlying liver disease process with thrombocytopenia, maintained on diltiazem, encourage continued outpatient follow-up with cardiology as previously arranged.

9.  Valvular heart disease: Patient status post AV bioprosthetic valve, echo 7/23 with EF 60% with indeterminate diastolic function with severely dilated RV and global RV dysfunction, severe mitral annular calcification with RVSP 44 mmHg.

10.  Depression and anxiety: Will continue patient home sertraline regimen.

11.  GERD: We will continue patient on PPI.

12.  Gout: We will continue patient home allopurinol regimen.

13.  CAREN: BiPAP nightly.

14.  DVT prophylaxis: SCDs. Holding administration of any chemoprophylaxis concept given significant thrombocytopenia.

15.  CODE STATUS: DNR-CCA, no intubation.

Charges/Coding
Visit Charges
Inpatient E&M: [id number] [facility] L3

## 2024-02-10 NOTE — RAD_ITS
REPORT-ID:[id number] 
 
STUDY:   X-RAY - ABDOMEN/PELVIS 
 
REASON FOR EXAM:   Male, 84 years old.  NGT placement verification 
 
TECHNIQUE:   Single AP view of the abdomen / pelvis. 
 
COMPARISON:   Same day 6:03 PM. 
___________________________________ 
 
FINDINGS: 
Nasogastric tube now terminates in the distal stomach. 
No other changes since earlier today. 
 
Electronically Signed: 
Fernandez Shane MD 
2024/02/10 at 22:59 EST 
Reading Location ID and State: [address] / [place] 
Tel [phone], Service support  [phone], Fax [phone] 
 
ORDER #: [id number] RAD/Abdomen Single View (Portable)  
IMPRESSION: undefined

## 2024-02-11 VITALS
RESPIRATION RATE: 22 BRPM | TEMPERATURE: 96.98 F | HEART RATE: 64 BPM | DIASTOLIC BLOOD PRESSURE: 66 MMHG | SYSTOLIC BLOOD PRESSURE: 115 MMHG | OXYGEN SATURATION: 99 %

## 2024-02-11 VITALS — RESPIRATION RATE: 16 BRPM | HEART RATE: 61 BPM

## 2024-02-11 VITALS — HEART RATE: 64 BPM

## 2024-02-11 VITALS
HEART RATE: 64 BPM | DIASTOLIC BLOOD PRESSURE: 65 MMHG | RESPIRATION RATE: 20 BRPM | SYSTOLIC BLOOD PRESSURE: 106 MMHG | OXYGEN SATURATION: 96 % | TEMPERATURE: 96.62 F

## 2024-02-11 VITALS
SYSTOLIC BLOOD PRESSURE: 106 MMHG | OXYGEN SATURATION: 99 % | RESPIRATION RATE: 20 BRPM | DIASTOLIC BLOOD PRESSURE: 78 MMHG | HEART RATE: 76 BPM | TEMPERATURE: 97.9 F

## 2024-02-11 VITALS — HEART RATE: 76 BPM | SYSTOLIC BLOOD PRESSURE: 106 MMHG | DIASTOLIC BLOOD PRESSURE: 76 MMHG

## 2024-02-11 VITALS — RESPIRATION RATE: 16 BRPM | HEART RATE: 59 BPM

## 2024-02-11 VITALS — OXYGEN SATURATION: 98 % | HEART RATE: 66 BPM | RESPIRATION RATE: 12 BRPM

## 2024-02-11 VITALS
OXYGEN SATURATION: 99 % | SYSTOLIC BLOOD PRESSURE: 111 MMHG | TEMPERATURE: 97.8 F | HEART RATE: 88 BPM | RESPIRATION RATE: 16 BRPM | DIASTOLIC BLOOD PRESSURE: 71 MMHG

## 2024-02-11 LAB
ALANINE AMINOTRANSFER ALT/SGPT: 22 U/L (ref 16–61)
ALBUMIN SERPL-MCNC: 2.1 G/DL (ref 3.2–5)
ALKALINE PHOSPHATASE: 159 U/L (ref 45–117)
ANION GAP: 1 (ref 5–15)
AST(SGOT): 24 U/L (ref 15–37)
BUN SERPL-MCNC: 25 MG/DL (ref 7–18)
BUN/CREAT RATIO: 22.1 RATIO (ref 10–20)
CALCIUM SERPL-MCNC: 11.1 MG/DL (ref 8.5–10.1)
CARBON DIOXIDE: 29 MMOL/L (ref 21–32)
CHLORIDE: 116 MMOL/L (ref 98–107)
DEPRECATED RDW RBC: 63.7 FL (ref 35.1–43.9)
ERYTHROCYTE [DISTWIDTH] IN BLOOD: 16.8 % (ref 11.6–14.6)
EST GLOM FILT RATE - AFR AMER: 80 ML/MIN (ref 60–?)
ESTIMATED CREATININE CLEARANCE: 58.4 ML/MIN
GLOBULIN: 2.8 G/DL (ref 2.2–4.2)
GLUCOSE: 108 MG/DL (ref 74–106)
HCT VFR BLD AUTO: 30.5 % (ref 40–54)
HGB BLD-MCNC: 10 G/DL (ref 13–16.5)
IMMATURE GRANULOCYTES COUNT: 0.02 X10^3/UL (ref 0–0)
MANUAL DIF COMMENT BLD-IMP: (no result)
MCV RBC: 103.7 FL (ref 80–94)
MEAN CORP HGB CONC: 32.8 G/DL (ref 32–36)
MEAN PLATELET VOL.: 10.8 FL (ref 6.2–12)
NRBC FLAGGED BY ANALYZER: 0 % (ref 0–5)
PLATELET # BLD: 50 K/MM3 (ref 150–450)
POSITIVE COUNT: YES
POSITIVE DIFFERENTIAL: YES
POTASSIUM: 3.4 MMOL/L (ref 3.5–5.1)
RBC # BLD AUTO: 2.94 M/MM3 (ref 4.6–6.2)
WBC # BLD AUTO: 4.7 K/MM3 (ref 4.4–11)

## 2024-02-11 RX ADMIN — PIPERACILLIN SODIUM AND TAZOBACTAM SODIUM 12.5 GM: 3; .375 INJECTION, POWDER, LYOPHILIZED, FOR SOLUTION INTRAVENOUS at 20:46

## 2024-02-11 RX ADMIN — ALBUTEROL SULFATE 2.5 MG: 2.5 SOLUTION RESPIRATORY (INHALATION) at 13:01

## 2024-02-11 RX ADMIN — POTASSIUM CHLORIDE 60 MEQ: 2 INJECTION, SOLUTION, CONCENTRATE INTRAVENOUS at 12:21

## 2024-02-11 RX ADMIN — PANTOPRAZOLE SODIUM 330 MG: 40 INJECTION, POWDER, FOR SOLUTION INTRAVENOUS at 10:26

## 2024-02-11 RX ADMIN — ALBUTEROL SULFATE 2.5 MG: 2.5 SOLUTION RESPIRATORY (INHALATION) at 07:10

## 2024-02-11 RX ADMIN — PIPERACILLIN SODIUM AND TAZOBACTAM SODIUM 12.5 GM: 3; .375 INJECTION, POWDER, LYOPHILIZED, FOR SOLUTION INTRAVENOUS at 04:22

## 2024-02-11 RX ADMIN — PANTOPRAZOLE SODIUM 330 MG: 40 INJECTION, POWDER, FOR SOLUTION INTRAVENOUS at 20:45

## 2024-02-11 RX ADMIN — PIPERACILLIN SODIUM AND TAZOBACTAM SODIUM 12.5 GM: 3; .375 INJECTION, POWDER, LYOPHILIZED, FOR SOLUTION INTRAVENOUS at 15:08

## 2024-02-11 RX ADMIN — ALBUTEROL SULFATE 2.5 MG: 2.5 SOLUTION RESPIRATORY (INHALATION) at 20:14

## 2024-02-11 NOTE — PCM.PN.HOSP
Reason for Visit
Reason for Visit: 
Diagnoses

Anemia in other chronic diseases classified elsewhere (02/08/24)
Obstructive sleep apnea (adult) (pediatric) (02/08/24)
Encephalopathy, unspecified (02/08/24)
Gastro-esophageal reflux disease without esophagitis (02/08/24)
Unspecified cirrhosis of liver (02/08/24)
Gout, unspecified (02/08/24)
Other ascites (02/08/24)
Other symptoms and signs involving the nervous system (02/08/24)
Dependence on other enabling machines and devices (02/08/24)



Subjective
Subjective
Patient overnight with no acute issues per discussion with staff.  There was some difficulty getting the NG tube in the day prior but he has since been tolerating it and has not had a ton of output from the J[facility]AN[facility]. He denies further dry 
heaves/nausea/emesis episodes. He denies abdominal pain. Patient is fatigued and is very nadir about his difficulty with recall but alert and awake. Discussed plan of care for GI involvement given findings on CT concerned could have CA stomach 
involvement.  Patient denies fevers, chills, chest pain or dyspnea.

Objective Data
Objective Data
Vital Signs: 
Vital Signs

Temp Pulse Resp BP Pulse Ox O2 Del Method O2 Flow Rate
 97.8 F   88   16   111/71   99   Nasal Cannula   2 
 02/11/24 04:43  02/11/24 04:43  02/11/24 04:43  02/11/24 04:43  02/11/24 04:43  02/11/24 04:43  02/11/24 04:43
FiO2
 2 
 02/09/24 12:41



Oxygen Flow Rate (L/min)       2                                                
Oxygen Delivery Method         Nasal Cannula                                    
Weight:                        226 lb 3.108 oz                                  
Body Mass Index (BMI)          32.4                                             


Intake & Output: 
Intake and Output for Last 24 Hours

 02/09/24 02/10/24 02/11/24
 23:59 23:59 23:59
Intake Total 1071.9167 / 1171.9167 590 / 590 50 / 50
Output Total 9800 / 73899 1250 / 1650 900 / 900
Balance -8728.0833 / -8978.0833 -660 / -1060 -850 / -850


Lab / Micro Data

02/11/24 06:48          

02/11/24 06:48          

Micro: 
Microbiology

02/10/24 13:57   Stool   Enteric Bacteriology - Final
02/10/24 10:59   Stool   Clostridioides difficile (PCR) - Final
02/08/24 16:11   Urine Catheter - Catheter   Legionella Antigen - Final
02/08/24 16:11   Urine Catheter - Catheter   Streptococcus pneumoniae Antigen (M - Final
02/07/24 15:15   Mucosa - Nose   SARS-CoV-2, Influenza & RSV (PCR) - Final


Radiography
Diagnostic Testing: 
Radiology Impression

KUB X-Ray  02/10/24 06:45
IMPRESSION:
 
Smooth appearance of the colonic wall, possible colitis. Mild gaseous
distention and oral contrast in the colon. Nonobstructive bowel gas
pattern.
 
Electronically Signed:
Lorie Patel MD
2024/02/10 at 9:05 EST
Reading Location ID and State: [address] / [place]
Tel [phone], Service support  [phone], Fax [phone]
 


Abdomen/Pelvis CT  02/10/24 11:49
IMPRESSION:
 
Moderate hiatal hernia with diffuse gastric wall thickening concerning for
gastritis.
Cirrhotic liver with a 1.2 cm indeterminate mass at the dome; recommend
follow-up multiphasic MRI or CT to assess for neoplasm.
Moderate right and mild left pleural effusions with bilateral lower lobe
atelectasis. Moderate ascites. Varices.
Mild splenomegaly. Cholelithiasis.
Multiple renal cysts.
Colonic diverticulosis without acute diverticulitis.
 
Electronically Signed:
Lorie Patel MD
2024/02/10 at 13:32 EST
Reading Location ID and State: [address] / [place]
Tel [phone], Service support  [phone], Fax [phone]
 


KUB X-Ray  02/10/24 15:10
IMPRESSION: undefined


KUB X-Ray  02/10/24 18:15
IMPRESSION:
Continued malposition nasogastric tube probably coiled in the oropharynx.
 
Electronically Signed:
Fernandez Shane MD
2024/02/10 at 19:29 EST
Reading Location ID and State: [address] / [place]
Tel [phone], Service support  [phone], Fax [phone]
 


KUB X-Ray  02/10/24 22:00
IMPRESSION: undefined




Physical Exam
Narrative
Physical Examination:
General: Awake, alert, oriented to self, place, recent events, is very nadir this morning mentioning he is having difficulty remembering given his prolonged hospitalization, he denies any abdominal pain or any recurrent nausea or emesis, tolerating 
NG.
Skin: Normal color, normal turgor, no icterus, no cyanosis except for occasional staged ecchymoses, stasis disease.
HEENT: AT/NC, EOMI, PERRLA, moderately dry MM, NG in place.
Lungs: Diminished, diffusely, greater posterior, mildly decreased effort, no evidence of any distress, no rales, ronchi or wheezing.
Heart: Regular rate and rhythm; no gallop, rub audible.
Abdomen: Soft, obese, status post paracentesis, nondistended appearing, NTTP, hyperactive BS. 
Extremities: No cyanosis, no clubbing, bilateral lower extremity not markedly pitting ankle edema, chronic component.
Neurological: Patient awake, alert, oriented as noted, cognitive function intact; pupils equally reactive to light and accommodation, cranial nerves grossly normal, moving all 4 extremities except significant left-sided hemiplegia left lower 
extremity, strength severely globally decreased.
Psychiatric: Affect appears fatigued but more talkative today, no acute evidence of depressive or anxiety feelings.

Assessment & Plan
Assessment/Plan
(1) Encephalopathy acute: 
PLAN: 
Plan
The patient is an 83 y/o M w/ PMHx: Chronic anemia/AOCD, CKD stage III unclear subtype, HTN, HLD, Hx CVA, Anxiety an Depression, Asthma, Valvular Heart Disease, PAF/Flutter, Nonobstructive CAD, CAREN on BIPAP, Non-alcoholic Liver Cirrhosis w/ Liver 
cancer w/ associated chronic thrombocytopenia/anemia/mild hyperammonemia, Obesity who presents to the [facility] ED on 2/7/24 with history of worsening fatigue, malaise, debility and weakness with chronic left lower extremity debilities following previous 
stroke but worse with increased lethargy prompting family to bring him into the ED for evaluation with initial concern for possible stroke.

1.  Acute Encephalopathy/Metabolic/Infectious, Adult FTT, multifactorial as noted also #2, #3, increased debility/weakness above baseline w/ Possible RUL Pneumonia/RUL Consolidation with possible gram-negative versus gram-positive organism versus 
Possible Metastatic Disease: Admission labs included CBC with WBC 4.9, N 110.7, .3, platelet 58 with lymphopenia, CMP with chloride 110, BUN/creatinine 40/1.19, glucose 119, calcium 12.5, hepatic profile with alk phos 156, ammonia 34, BNP 
104.6, troponin 24, chest x-ray with blunting right costophrenic angle with bibasilar atelectasis with increased markings right upper lobe abutting the right minor fissure of unclear significance, possibly atelectasis versus infiltrate, CT brain 
with chronic involutional changes, CTA head and neck with no significant major vaso-occlusive disease in the head or neck, evidence of a right upper lobe consolidation, MRI of the brain with chronic involutional changes with no acute intracranial 
findings. Admitted to PCU, given MRI without acute CVA suspect process in RUL likely associated with encephalopathy and increased weakness, maintained on oxygen with wean as tolerated to room air, continue ATC budesonide, PRN albuterol, initiated 
given history of recent hospitalization within the 90-day leslee on IV Zosyn and Vancomycin however negative MRSA screen thus de-escalated off Vanc therapy. Negative urine antigens. Procalcitonin 0.17.  Bld cx x 2 obtained in the ED. PT/OT/ST/CM 
consulted for discharge planning. Given history will need to consider repeat dedicated CT following this treatment to assure not in fact metastatic disease component.

2.  Increased left lower extremity weakness above baseline with encephalopathy, stroke ruled out, likely secondary to #1 as noted: Upon admission concern for initially recurrent stroke with baseline left-sided hemiplegia but worsened, CT brain with 
chronic involutional changes, CTA head and neck with no significant major vaso-occlusive disease in the head or neck, evidence of a right upper lobe consolidation, MRI of the brain with chronic involutional changes with no acute intracranial 
findings with stroke ruled out, allergy to statin with significant thrombocytopenia thus antiplatelet deferred and lieu suspicion for stroke upon admission, echocardiogram with EF 55%, moderately dilated RV, mild to moderate global RV systolic 
dysfunction, severe biatrial dilatation, mild TVI, bioprosthetic aortic valve with mean gradient across the valve 6.7 mmHg, bubble contrast study negative.  Given no evidence of stroke following MRI completion hypertensive regimen restarted. [facility] 
teleneurology consulted and at this point they do believe that his presentation is likely infectious secondary to his new pneumonia however believe it could be related with his cirrhotic disease and possibly also hypercalcemia w/ recommendation of 
titrating bowel movements 2-3 daily w/ bowel regimen started; however, abdominal likely distended secondary to ascites confirmed on limited 2/8/24 US, 2/9/24 paracentesis performed with 8200 cc fluid removed and diagnostic evaluation studies 
requested including cytology. Given amount removed IV albumin ~6 g/L administered with 50 gm 25% albumin administered. Neurology also although lower suspicion recommended EEG which has also been performed 2/9/24 w/ diffuse slow activity, moderate in 
degree. ST evaluation for moderate oropharyngeal dysphagia w/ Mechanical Soft Textures (Soft and bite size textures IDDSI Level 6) and Nectar-thick Liquids w/ Small Bites, Small Sips, Slow Rate, Alternate bites/solids and sips/liquids, Sitting 
upright and Remain sitting upright for 30 minutes after PO intake. ST recommendation for GI Consult outpatient for barium evaluation lower esophagus. As noted #3, thus temporarily oral intake aside medications held.

3.  New onset intractable nausea and emesis, suspected Acute Gastritis, possible component Colitis: Onset 2/9/24-20/10/24 bouts intractable nausea, emesis.  Discussed with staff and holding usage of CPAP for aspiration risk. 2/10/24 KUB Smooth 
appearance of the colonic wall, possible colitis. Mild gaseous distention and oral contrast in the colon. Nonobstructive bowel gas pattern. 2/10/24 placed NG given ongoing N/V. NPO status transitioned to IV PPI. 2/10/24 CT A/P obtained w/ noted 
moderate hiatal hernia with diffuse gastric wall thickening concerning for gastritis, cirrhotic liver with a 1.2 cm indeterminate mass at the dome with is known from prior, moderate right and mild left pleural effusions with bilateral lower lobe 
atelectasis, moderate ascites, varices, mild splenomegaly, evidence of cholelithiasis, multiple renal cysts, colonic diverticulosis without acute diverticulitis. As noted gastritis and colitis concerns, obtained c-diff and enteric which were 
negative. Maintained on IV PPI, continued on IV zosyn as noted. GI consulted and amenable to NG d/c per their discretion pending their evaluation for possible upper endoscopy for assess of his stomach given concerns as noted.

4.  Hypercalcemia: Likely contributing to his weakness and altered mental status most probably secondary to underlying malignancy, has been previously been higher. Admission Ca12.5, ICa elevated 6.38 (4.26-5.20 range), will obtain PTH, vitamin D 
1,25-OH2 and vitamin D 25-OH, initially given judicious IVFs but given ascites stopped, 2/9/24 administered lasix 40 mg IV x 1, 2/10/24 corrected Ca++ level 13.2, 2/10/24 administered pamidronate 60 mg x 1, 2/10/24 Ca 11.8, unfortunately patient had 
tachycardia, nausea with the infusion and VS improved immediately once infusion completed. He since has had nausea, emesis bouts overnight, possibly related with this medication sa noted #3. 2/11/24 Ca 11.1, trending down.

5.  Hypokalemia: Admission K+ 3.4, recent magnesium level 2.3, repeat level in AM. Currently given his acute nausea/emesis holding oral regimen for now, giving judicious IVFs and once improved will supplement. 2/11/24 K+ 3.4, supplementation added 
to IVFs.

6.  Hypernatremia, Hyperchloremia: 2/11/24 Na 146, Chl 116, as noted K also decreased, maintained on very judicious D5W with K+, continue to monitor CMP.

7.  Nonalcoholic cirrhosis with underlying liver cancer with associated pancytopenia, chronic/chronic iron deficiency anemia: Patient following with Dr. Ortega, continued chronic oral iron supplementation, continued patient home metoprolol as BP 
allows, rifaximin. NH level 34; however, given constipation complaint noted to Neurology per patient will administer lactulose 20 mg BID regimen to titrate bowel movements, will continue to re-evaluate and alter as needed. 2/8/24 limited abdominal 
US with noted ascites in all quadrants. 2/9/24 paracentesis performed with 8200 cc fluid removed and diagnostic evaluation studies requested including cytology. Given amount removed IV albumin ~6 g/L administered with 50 gm 25% albumin administered. 
Discussed case with Dr. Ortega and his service will see on Monday per patient daughter request. 2/11/24 CBC w/ WBC 4.7, hemoglobin 10.0, .7, platelet 50 with lymphopenia.

8.  Urinary retention with Love catheter placement: 2/8/2024-2/9/2024 patient with significant urinary retention with straight cath attempts however eventually Love catheter placement necessary, urinalysis obtained and unremarkable, Flomax 
started.  Will continue and make reattempts to de-escalate catheter however if continued require catheter may necessitate outpatient follow-up with urology.

9.  PAF: Patient not chronically anticoagulated secondary to his underlying liver disease process with thrombocytopenia, maintained on diltiazem until temp hold with GI. Will encourage continued outpatient follow-up with cardiology as previously 
arranged. Will maintain on BID low dose BB with hold parameters until oral intake again allowed. GI consulted as noted. Amenable to NG d/c per their discretion pending their evaluation for possible upper endoscopy for assess of his stomach given 
concerns as noted.

10.  Valvular heart disease: Patient status post AV bioprosthetic valve, echo 7/23 with EF 60% with indeterminate diastolic function with severely dilated RV and global RV dysfunction, severe mitral annular calcification with RVSP 44 mmHg.

11.  Depression and anxiety: Will continue patient home sertraline regimen, given as able given NG status as noted.

12.  GERD: We will continue patient on PPI, transitioned to IV with NG placement as noted.

13.  Gout: We will continue patient home allopurinol regimen, given as able given NG status as noted

14.  CAREN: BiPAP nightly.

15.  DVT prophylaxis: SCDs. Holding administration of any chemoprophylaxis concept given significant thrombocytopenia.

16.  CODE STATUS: DNR-CCA, no intubation.

Charges/Coding
Visit Charges
Inpatient E&M: [address] [facility] L3

## 2024-02-11 NOTE — CON.PCM.GI_ITS
HPI    
Consult Data    
Date of Consult: 02/11/24    
HPI Narrative    
HPI Narrative:     
NENA MADRIGAL, is a 84-year-old male history of cirrhosis, liver cancer, GERD,   
pafib, anemia, gout CVA presented to [facility] ED 2/7/2024 from  
assisted living for altered mental status.  Reportedly he has not been himself   
since Sunday and at that time had been having some difficulty talking and at   
times slurred speech.    
    
Family was concerned for stroke with patient due to his left leg weakness and   
not being able to talk for a period of time earlier.  CT brain in ED negative,   
ammonia 34, patiently mildly tachycardic.  Lab workup otherwise similar to   
patient's baseline/fairly benign.  Given there is still concern patient may have  
had recent stroke hospitalist contacted for admission for stroke rule out.      
    
-Admission labs included CBC with WBC 4.9, N 110.7, .3, platelet 58 with   
lymphopenia, CMP with chloride 110, BUN/creatinine 40/1.19, glucose 119, calcium  
12.5, hepatic profile with alk phos 156, ammonia 34, .6, troponin 24,    
 -chest x-ray with blunting right costophrenic angle with bibasilar atelectasis   
with increased markings right upper lobe abutting the right minor fissure of   
unclear significance, possibly atelectasis versus infiltrate, CT brain with   
chronic involutional changes,    
- CTA head and neck with no significant major vaso-occlusive disease in the head  
or neck, evidence of a right upper lobe consolidation    
- MRI of the brain with chronic involutional changes with no acute intracranial   
findings.     
    
[facility] teleneurology consulted and at this point they do believe that his   
presentation is likely infectious secondary to his new pneumonia however believe  
it could be related with his cirrhotic disease and possibly also hypercalcemia   
w/ recommendation of titrating bowel movements 2-3 daily w/ bowel regimen   
started;    
    
Abdominal likely distended secondary to ascites confirmed on limited 2/8/24 US,   
2/9/24 paracentesis performed with 8200 cc fluid removed and diagnostic   
evaluation studies requested including cytology.     
    
ST evaluation for moderate oropharyngeal dysphagia w/ Mechanical Soft Textures   
(Soft and bite size textures IDDSI Level 6) and Nectar-thick Liquids w/ Small   
Bites, Small Sips, Slow Rate, Alternate bites/solids and sips/liquids, Sitting   
upright and Remain sitting upright for 30 minutes after PO intake.     
    
ST recommendation for GI Consult outpatient for barium evaluation lower   
esophagus    
    
 New onset intractable nausea and emesis, suspected Acute Gastritis, possible   
component Colitis:    
- 2/10/24 placed NG given ongoing N/V. NPO status transitioned to IV PPI.   
2/10/24 CT A/P obtained w/ noted moderate hiatal hernia with diffuse gastric   
wall thickening concerning for gastritis, cirrhotic liver with a 1.2 cm indeter  
minate mass at the dome with is known from prior, moderate right and mild left   
pleural effusions with bilateral lower lobe atelectasis, moderate ascites,   
varices, mild splenomegaly, evidence of cholelithiasis, multiple renal cysts,   
colonic diverticulosis without acute diverticulitis.    
    
 As noted gastritis and colitis concerns, obtained c-diff and enteric which were  
negative. I was consulted for possible upper endoscopy for assess of his stomach  
    
    
    
[facility]    
Medical History (Updated 02/09/24 @ 13:03 by Maribell Lopez, NP-C)    
    
Anemia    
Anemia in chronic kidney disease    
Anemia of chronic disease    
Aortic valve disorder    
Asthma    
Body mass index (BMI) 40.0-44.9, adult    
Chronic acquired lymphedema    
CKD (chronic kidney disease), stage III    
Depression    
Essential (primary) hypertension    
GERD (gastroesophageal reflux disease)    
Gout    
History of cerebrovascular accident    
History of splenomegaly    
Hyperlipemia    
Liver cancer    
Morbid obesity    
Multiple lung nodules    
New onset atrial flutter    
Non-alcoholic cirrhosis    
Nonobstructive atherosclerosis of coronary artery    
Nonrheumatic aortic (valve) stenosis    
OAB (overactive bladder)    
Obesity    
Obesity (BMI 30-39.9)    
CAREN treated with BiPAP    
Osteoarthritis    
Paroxysmal A-fib    
PCK (polycystic kidney disease)    
Peripheral neuropathy    
Polycystic kidney disease    
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    
Right bundle branch block (RBBB)    
Splenomegaly    
Thrombocytopenia    
Thrombocytopenia    
Urge incontinence    
    
    
                                Home Medications    
    
allopurinol 300 mg tablet 300 mg PO DAILY gout 12/27/16 [History Last Taken   
12/22/19]    
fenofibrate nanocrystallized 48 mg tablet 48 mg PO DAILY cholesterol 12/27/16   
[History Last Taken 12/22/19]    
magnesium oxide 400 mg (241.3 mg magnesium) tablet 400 mg PO DAILY supplement   
12/27/16 [History Last Taken 12/22/19]    
multivit-min-folic acid 0.4 mg-lycopene 300 mcg-lutein 250 mcg tablet 1 ea PO   
DAILY vitamin 12/27/16 [History Last Taken 12/22/19]    
sertraline 100 mg tablet 50 mg PO QHS depression 05/21/20 [History Last Taken   
Unknown]    
aspirin 81 mg tablet,delayed release (Adult Aspirin Regimen) 81 mg PO DAILY   
heart 07/27/21 [History Last Taken Unknown]    
ferrous sulfate 325 mg (65 mg iron) tablet 325 mg PO BID supplement 07/27/21   
[History Last Taken Unknown]    
albuterol sulfate 90 mcg/actuation aerosol inhaler 2 puff inhalation Q6H PRN   
ASTHMA 02/08/22 [History Last Taken Unknown]    
fluticasone propionate 50 mcg/actuation nasal spray,suspension 1 spray   
intranasal DAILY nasal 02/08/22 [History Last Taken Unknown]    
omeprazole 40 mg capsule,delayed release 40 mg PO DAILY reflux 02/08/22 [History  
 Last Taken Unknown]    
oxybutynin chloride 15 mg tablet,extended release 24 hr 30 mg PO DAILY bladder   
02/08/22 [History Last Taken Unknown]    
ascorbic acid (vitamin C) 500 mg capsule 500 mg PO BID supplement 01/31/23   
[History Last Taken Unknown]    
furosemide 20 mg tablet 20 mg PO Q OTHER DAY heart 01/31/23 [History Last Taken   
Unknown]    
nitroglycerin 0.4 mg sublingual tablet 0.4 mg sublingual Q5M PRN chest pain   
01/31/23 [History Last Taken Unknown]    
cyanocobalamin (vitamin B-12) 5,000 mcg capsule 2,500 mcg PO DAILY supplement   
07/15/23 [History Last Taken Unknown]    
rifaximin 550 mg tablet (Xifaxan) 550 mg PO BID liver 07/15/23 [History Last   
Taken Unknown]    
diltiazem HCl 120 mg capsule,extended release 24 hr 120 mg PO DAILY #30 caps   
08/23/23 [Rx Last Taken Unknown]    
metoprolol tartrate 25 mg tablet 25 mg PO BID #60 tabs 08/23/23 [Rx Last Taken   
Unknown]    
fluticasone furoate 200 mcg-vilanterol 25 mcg/dose inhalation powder (Breo   
Ellipta) 1 inh inhalation DAILY breathing 12/21/23 [History Last Taken Unknown]    
cefdinir 300 mg capsule 300 mg PO BID antibiotic 4 days #8 caps 12/26/23 [Rx   
Last Taken Unknown]    
ferrous sulfate 325 mg (65 mg iron) tablet,delayed release mg PO 02/07/24   
[History Last Taken Unknown]    
    
    
                                            
    
    
    
Allergy/AdvReac Type Severity Reaction Status Date / Time    
     
hydrocodone [From Vicodin] Allergy  Other Verified 02/07/24 14:53    
     
strawberry Allergy  Hives Verified 02/07/24 14:53    
     
venom-honey bee Allergy  Anaphylaxis Verified 02/07/24 14:53    
    
[bee venom (honey bee)]         
     
adhesive tape AdvReac  Rash Verified 02/07/24 14:53    
     
atorvastatin AdvReac  PT UNSURE Verified 02/07/24 14:53    
    
   OF REACTION      
     
montelukast AdvReac  PT UNSURE Verified 02/07/24 14:53    
    
   OF REACTION      
    
    
    
Family History (Reviewed 02/07/24 @ 16:45 by Dr. Ritchie Fernando, DO)    
Father   CAD (coronary artery disease)    
Brother   Diabetes    
Mother   Heart disease    
    
    
Surgical History (Reviewed 02/07/24 @ 16:45 by Dr. Ritchie Fernando, DO)    
    
Aortic valve replaced    
History of aortic valve replacement with bioprosthetic valve (10/04/11)    
History of hip surgery (05/2020)    
History of knee replacement    
History of left heart catheterization (09/21/11)    
History of tonsillectomy    
History of total left hip arthroplasty    
History of total left hip replacement    
History of umbilical hernia repair    
    
    
Social History (Reviewed 02/07/24 @ 16:45 by Dr. Ritchie Fernando, DO)    
household members:  none     
Smoking Status:  Never smoker     
alcohol intake:  never     
substance use type:  does not use     
    
    
    
ROS    
ROS Narrative    
General: Denies fever/chills    
HENT: Denies headache, denies stuffy nose, denies sore throat    
EYES: Denies changes in vision    
Resp: Has had persistent cough, possibly some shortness of breath    
Cardiac: Denies chest pain    
GI: Denies abdominal pain, denies changes in bowel, denies nausea/vomiting    
: Denies changes in urination    
Extremity: Chronic lower extremity swelling    
MSK: Chronic left lower extremity weakness but more so than usual and more so   
than right side    
Neuro: Denies any numbness/tingling    
Heme: Denies any bleeding or bruising    
Skin: Denies rashes    
Psychiatric: No complaints voiced    
    
Physical Exam    
Narrative    
Physical Examination:    
General: Awake, alert, oriented to self, place, recent events, is very nadir   
this morning mentioning he is having difficulty remembering given his prolonged   
hospitalization, he denies any abdominal pain or any recurrent nausea or emesis,  
tolerating NG.    
Skin: Normal color, normal turgor, no icterus, no cyanosis except for occasional  
staged ecchymoses, stasis disease.    
HEENT: AT/NC, EOMI, PERRLA, moderately dry MM, NG in place.    
Lungs: Diminished, diffusely, greater posterior, mildly decreased effort, no   
evidence of any distress, no rales, ronchi or wheezing.    
Heart: Regular rate and rhythm; no gallop, rub audible.    
Abdomen: Soft, obese, status post paracentesis, nondistended appearing, NTTP,   
hyperactive BS.     
Extremities: No cyanosis, no clubbing, bilateral lower extremity not markedly   
pitting ankle edema, chronic component.    
Neurological: Patient awake, alert, oriented as noted, cognitive function   
intact; pupils equally reactive to light and accommodation, cranial nerves   
grossly normal, moving all 4 extremities except significant left-sided   
hemiplegia left lower extremity, strength severely globally decreased.    
Psychiatric: Affect appears fatigued but more talkative today, no acute evidence  
of depressive or anxiety feelings.    
    
Lab / Micro Data    
    
                                                        02/12/24 05:15              
    
                                                        02/12/24 05:15              
    
Labs:     
                         Laboratory Results - last 24 hr    
    
02/09/24 05:45: Vit D 1,25-Dihydroxy 29.9    
02/09/24 12:07: Fl Pathologist Comment Reviewed    
02/12/24 05:15: WBC 4.4, RBC 2.88 L, Hgb 9.7 L, Hct 29.9 L, .8 H, MCH   
33.7 H, MCHC 32.4, RDW Std Deviation 64.0 H, RDW Coeff of Ilia 16.8 H, Plt Count   
55 L, MPV 11.4, Immature Gran % (Auto) 0.200, Neut % (Auto) 87.3 H, Lymph %   
(Auto) 5.2 L, Mono % (Auto) 4.1, Eos % (Auto) 2.7, Baso % (Auto) 0.5, Absolute   
Neuts (auto) 3.8, Absolute Lymphs (auto) 0.23 L, Nucleated RBC % 0, Sodium 146 H  
, Potassium 3.7, Chloride 116 H, Carbon Dioxide 27.0, Anion Gap 3 L, BUN 21 H,   
Creatinine 0.95, Estim Creat Clear Calc 69.56, Est GFR (MDRD) Af Amer 97, Est   
GFR (MDRD) Non-Af 80, BUN/Creatinine Ratio 22.1 H, Glucose 106, Calcium 10.8 H,   
Total Bilirubin 0.70, AST 22, ALT 18, Alkaline Phosphatase 140 H, Total Protein   
4.7 L, Albumin 2.0 L, Globulin 2.7, Albumin/Globulin Ratio 0.7 L    
    
    
Micro:     
                                  Microbiology    
    
02/10/24 13:57   Stool   Enteric Bacteriology - Final    
    
    
    
Assessment & Plan    
Assessment/Plan    
(1) Neurologic abnormality:     
(2) Gout:     
(3) GERD (gastroesophageal reflux disease):     
(4) Anemia of chronic disease:     
(5) Cirrhosis:     
QUALIFIERS:               Hepatic cirrhosis type: unspecified hepatic cirrhosis   
Ascites presence: with ascites  Qualified Code(s): K74.60 - Unspecified   
cirrhosis of liver; R18.8 - Other ascites    
(6) Obstructive sleep apnea on CPAP:     
PLAN:     
Plan    
84-year-old gentleman with past medical history of Baxter cirrhosis complicated by  
hepatocellular carcinoma with malignant ascites currently status post large-  
volume paracentesis and NG tube placed due to intractable nausea vomiting    
    
# Pancytopenia    
-Patient routinely pancytopenic    
-White blood cell count 4.9, lower limits of normal today though usually is   
below the 4.4 threshold    
-Appears to be at baseline    
    
    
#Liver cancer and cirrhosis    
-Follows with Dr. Ortega    
    
# Ascites    
-Likely secondary to peritoneal carcinomatosis in the setting of hepatocellular   
carcinoma    
    
# Intractable nausea vomiting    
-Likely secondary to large volume ascites.  Given the CT findings I will perform  
an upper endoscopy to evaluate his upper GI tract.    
The family was explained alternatives, risk, benefits including not withstanding  
bleeding, infection, sepsis, perforation, need for emergent urgent death.  He   
will have an ASA of 3.    
    
    
    
Charges/Coding    
Visit Charges    
Inpatient E&M: 48086 Init Hosp L3

## 2024-02-11 NOTE — RAD_ITS
REPORT-ID:[id number] 
 
HISTORY: intractable N/V. 
 
TECHNIQUE: XR Abdomen 1 View. 
COMPARISON: Prior day. 
 
FINDINGS: 
 
BOWEL GAS PATTERN: Nasogastric tube tip at the level of the distal stomach. 
Residual contrast in the nondilated colon down to the level of the rectum 
with colonic diverticula noted. 
FREE AIR: Not assessed on supine view. No dilated bowel loops identified 
CALCIFICATIONS: Vascular calcifications observed. 
BONES: Gamma nail fixation of chronic left femur fracture. 
 
ORDER #: [id number] RAD/Abdomen Single View  
IMPRESSION:  
 
  
Non-obstructive bowel gas pattern.  
 
  
Electronically Signed:  
Lorie Patel MD  
2024/02/11 at 9:24 EST  
Reading Location ID and State: [id number] / [place]  
Tel [phone], Service support  [phone], Fax [phone]

## 2024-02-11 NOTE — PN.HOSP_ITS
Reason for Visit    
Reason for Visit:     
Diagnoses    
    
Anemia in other chronic diseases classified elsewhere (02/08/24)    
Obstructive sleep apnea (adult) (pediatric) (02/08/24)    
Encephalopathy, unspecified (02/08/24)    
Gastro-esophageal reflux disease without esophagitis (02/08/24)    
Unspecified cirrhosis of liver (02/08/24)    
Gout, unspecified (02/08/24)    
Other ascites (02/08/24)    
Other symptoms and signs involving the nervous system (02/08/24)    
Dependence on other enabling machines and devices (02/08/24)    
    
    
    
Subjective    
Subjective    
Patient overnight with no acute issues per discussion with staff.  There was   
some difficulty getting the NG tube in the day prior but he has since been   
tolerating it and has not had a ton of output from the [facility]. He denies further dry  
heaves/nausea/emesis episodes. He denies abdominal pain. Patient is fatigued and  
is very nadir about his difficulty with recall but alert and awake. Discussed   
plan of care for GI involvement given findings on CT concerned could have CA   
stomach involvement.  Patient denies fevers, chills, chest pain or dyspnea.    
    
Objective Data    
Objective Data    
Vital Signs:     
Vital Signs    
    
    
    
Temp Pulse Resp BP Pulse Ox O2 Del Method O2 Flow Rate    
     
 97.8 F   88   16   111/71   99   Nasal Cannula   2     
     
 02/11/24 04:43  02/11/24 04:43  02/11/24 04:43  02/11/24 04:43  02/11/24 04:43   
02/11/24 04:43  02/11/24 04:43    
    
    
    
FiO2    
     
 2     
     
 02/09/24 12:41    
    
    
    
    
Oxygen Flow Rate (L/min)         2                                                
       
Oxygen Delivery Method           Nasal Cannula                                    
       
Weight:                          226 lb 3.108 oz                                  
       
Body Mass Index (BMI)            32.4                                             
       
    
    
Intake & Output:     
                       Intake and Output for Last 24 Hours    
    
    
    
 02/09/24 02/10/24 02/11/24    
    
 23:59 23:59 23:59    
     
Intake Total 1071.9167 / 1171.9167 590 / 590 50 / 50    
     
Output Total 9800 / 31193 1250 / 1650 900 / 900    
     
Balance -8728.0833 / -8978.0833 -660 / -1060 -850 / -850    
    
    
    
Lab / Micro Data    
    
                                                        02/11/24 06:48              
    
                                                        02/11/24 06:48              
    
Micro:     
                                  Microbiology    
    
02/10/24 13:57   Stool   Enteric Bacteriology - Final    
02/10/24 10:59   Stool   Clostridioides difficile (PCR) - Final    
02/08/24 16:11   Urine Catheter - Catheter   Legionella Antigen - Final    
02/08/24 16:11   Urine Catheter - Catheter   Streptococcus pneumoniae Antigen (M  
- Final    
02/07/24 15:15   Mucosa - Nose   SARS-CoV-2, Influenza & RSV (PCR) - Final    
    
    
Radiography    
Diagnostic Testing:     
                              Radiology Impression    
    
KUB X-Ray  02/10/24 06:45    
IMPRESSION:    
     
Smooth appearance of the colonic wall, possible colitis. Mild gaseous    
distention and oral contrast in the colon. Nonobstructive bowel gas    
pattern.    
     
Electronically Signed:    
Lorie Patel MD    
2024/02/10 at 9:05 EST    
Reading Location ID and State: [address] / [place]    
Tel [phone], Service support  [phone], Fax [phone]    
     
    
    
Abdomen/Pelvis CT  02/10/24 11:49    
IMPRESSION:    
     
Moderate hiatal hernia with diffuse gastric wall thickening concerning for    
gastritis.    
Cirrhotic liver with a 1.2 cm indeterminate mass at the dome; recommend    
follow-up multiphasic MRI or CT to assess for neoplasm.    
Moderate right and mild left pleural effusions with bilateral lower lobe    
atelectasis. Moderate ascites. Varices.    
Mild splenomegaly. Cholelithiasis.    
Multiple renal cysts.    
Colonic diverticulosis without acute diverticulitis.    
     
Electronically Signed:    
Lorie Patel MD    
2024/02/10 at 13:32 EST    
Reading Location ID and State: [address] / [place]    
Tel [phone], Service support  [phone], Fax [phone]    
     
    
    
KUB X-Ray  02/10/24 15:10    
IMPRESSION: undefined    
    
    
KUB X-Ray  02/10/24 18:15    
IMPRESSION:    
Continued malposition nasogastric tube probably coiled in the oropharynx.    
     
Electronically Signed:    
Fernandez Shane MD    
2024/02/10 at 19:29 EST    
Reading Location ID and State: [address] / [place]    
Tel [phone], Service support  [phone], Fax [phone]    
     
    
    
KUB X-Ray  02/10/24 22:00    
IMPRESSION: undefined    
    
    
    
    
Physical Exam    
Narrative    
Physical Examination:    
General: Awake, alert, oriented to self, place, recent events, is very nadir   
this morning mentioning he is having difficulty remembering given his prolonged   
hospitalization, he denies any abdominal pain or any recurrent nausea or emesis,  
tolerating NG.    
Skin: Normal color, normal turgor, no icterus, no cyanosis except for occasional  
staged ecchymoses, stasis disease.    
HEENT: AT/NC, EOMI, PERRLA, moderately dry MM, NG in place.    
Lungs: Diminished, diffusely, greater posterior, mildly decreased effort, no   
evidence of any distress, no rales, ronchi or wheezing.    
Heart: Regular rate and rhythm; no gallop, rub audible.    
Abdomen: Soft, obese, status post paracentesis, nondistended appearing, NTTP,   
hyperactive BS.     
Extremities: No cyanosis, no clubbing, bilateral lower extremity not markedly   
pitting ankle edema, chronic component.    
Neurological: Patient awake, alert, oriented as noted, cognitive function   
intact; pupils equally reactive to light and accommodation, cranial nerves   
grossly normal, moving all 4 extremities except significant left-sided   
hemiplegia left lower extremity, strength severely globally decreased.    
Psychiatric: Affect appears fatigued but more talkative today, no acute evidence  
of depressive or anxiety feelings.    
    
Assessment & Plan    
Assessment/Plan    
(1) Encephalopathy acute:     
PLAN:     
Plan    
The patient is an 83 y/o M w/ PMHx: Chronic anemia/AOCD, CKD stage III unclear   
subtype, HTN, HLD, Hx CVA, Anxiety an Depression, Asthma, Valvular Heart   
Disease, PAF/Flutter, Nonobstructive CAD, CAREN on BIPAP, Non-alcoholic Liver   
Cirrhosis w/ Liver cancer w/ associated chronic thrombocytopenia/anemia/mild   
hyperammonemia, Obesity who presents to the [facility] ED on 2/7/24 with history of   
worsening fatigue, malaise, debility and weakness with chronic left lower   
extremity debilities following previous stroke but worse with increased lethargy  
prompting family to bring him into the ED for evaluation with initial concern   
for possible stroke.    
    
1.  Acute Encephalopathy/Metabolic/Infectious, Adult FTT, multifactorial as   
noted also #2, #3, increased debility/weakness above baseline w/ Possible RUL   
Pneumonia/RUL Consolidation with possible gram-negative versus gram-positive   
organism versus Possible Metastatic Disease: Admission labs included CBC with   
WBC 4.9, N 110.7, .3, platelet 58 with lymphopenia, CMP with chloride   
110, BUN/creatinine 40/1.19, glucose 119, calcium 12.5, hepatic profile with alk  
phos 156, ammonia 34, .6, troponin 24, chest x-ray with blunting right   
costophrenic angle with bibasilar atelectasis with increased markings right   
upper lobe abutting the right minor fissure of unclear significance, possibly   
atelectasis versus infiltrate, CT brain with chronic involutional changes, CTA   
head and neck with no significant major vaso-occlusive disease in the head or   
neck, evidence of a right upper lobe consolidation, MRI of the brain with   
chronic involutional changes with no acute intracranial findings. Admitted to   
PCU, given MRI without acute CVA suspect process in RUL likely associated with   
encephalopathy and increased weakness, maintained on oxygen with wean as   
tolerated to room air, continue ATC budesonide, PRN albuterol, initiated given   
history of recent hospitalization within the 90-day leslee on IV Zosyn and   
Vancomycin however negative MRSA screen thus de-escalated off Vanc therapy.   
Negative urine antigens. Procalcitonin 0.17.  Bld cx x 2 obtained in the ED.   
PT/OT/ST/CM consulted for discharge planning. Given history will need to   
consider repeat dedicated CT following this treatment to assure not in fact   
metastatic disease component.    
    
2.  Increased left lower extremity weakness above baseline with encephalopathy,   
stroke ruled out, likely secondary to #1 as noted: Upon admission concern for   
initially recurrent stroke with baseline left-sided hemiplegia but worsened, CT   
brain with chronic involutional changes, CTA head and neck with no significant   
major vaso-occlusive disease in the head or neck, evidence of a right upper lobe  
consolidation, MRI of the brain with chronic involutional changes with no acute   
intracranial findings with stroke ruled out, allergy to statin with significant   
thrombocytopenia thus antiplatelet deferred and lieu suspicion for stroke upon   
admission, echocardiogram with EF 55%, moderately dilated RV, mild to moderate   
global RV systolic dysfunction, severe biatrial dilatation, mild TVI,   
bioprosthetic aortic valve with mean gradient across the valve 6.7 mmHg, bubble   
contrast study negative.  Given no evidence of stroke following MRI completion   
hypertensive regimen restarted. [facility] tel[facility]urology consulted and at this point   
they do believe that his presentation is likely infectious secondary to his new   
pneumonia however believe it could be related with his cirrhotic disease and   
possibly also hypercalcemia w/ recommendation of titrating bowel movements 2-3   
daily w/ bowel regimen started; however, abdominal likely distended secondary to  
ascites confirmed on limited 2/8/24 US, 2/9/24 paracentesis performed with 8200   
cc fluid removed and diagnostic evaluation studies requested including cytology.  
Given amount removed IV albumin ~6 g/L administered with 50 gm 25% albumin   
administered. Neurology also although lower suspicion recommended EEG which has   
also been performed 2/9/24 w/ diffuse slow activity, moderate in degree. ST   
evaluation for moderate oropharyngeal dysphagia w/ Mechanical Soft Textures   
(Soft and bite size textures IDDSI Level 6) and Nectar-thick Liquids w/ Small   
Bites, Small Sips, Slow Rate, Alternate bites/solids and sips/liquids, Sitting   
upright and Remain sitting upright for 30 minutes after PO intake. ST   
recommendation for GI Consult outpatient for barium evaluation lower esophagus.   
As noted #3, thus temporarily oral intake aside medications held.    
    
3.  New onset intractable nausea and emesis, suspected Acute Gastritis, possible  
component Colitis: Onset 2/9/24-20/10/24 bouts intractable nausea, emesis.    
Discussed with staff and holding usage of CPAP for aspiration risk. 2/10/24 KUB   
Smooth appearance of the colonic wall, possible colitis. Mild gaseous distention  
and oral contrast in the colon. Nonobstructive bowel gas pattern. 2/10/24 placed  
NG given ongoing N/V. NPO status transitioned to IV PPI. 2/10/24 CT A/P obtained  
w/ noted moderate hiatal hernia with diffuse gastric wall thickening concerning   
for gastritis, cirrhotic liver with a 1.2 cm indeterminate mass at the dome with  
is known from prior, moderate right and mild left pleural effusions with   
bilateral lower lobe atelectasis, moderate ascites, varices, mild splenomegaly,   
evidence of cholelithiasis, multiple renal cysts, colonic diverticulosis without  
acute diverticulitis. As noted gastritis and colitis concerns, obtained c-diff   
and enteric which were negative. Maintained on IV PPI, continued on IV zosyn as   
noted. GI consulted and amenable to NG d/c per their discretion pending their   
evaluation for possible upper endoscopy for assess of his stomach given concerns  
as noted.    
    
4.  Hypercalcemia: Likely contributing to his weakness and altered mental status  
most probably secondary to underlying malignancy, has been previously been   
higher. Admission Ca12.5, ICa elevated 6.38 (4.26-5.20 range), will obtain PTH,   
vitamin D 1,25-OH2 and vitamin D 25-OH, initially given judicious IVFs but given  
ascites stopped, 2/9/24 administered lasix 40 mg IV x 1, 2/10/24 corrected Ca++   
level 13.2, 2/10/24 administered pamidronate 60 mg x 1, 2/10/24 Ca 11.8,   
unfortunately patient had tachycardia, nausea with the infusion and VS improved   
immediately once infusion completed. He since has had nausea, emesis bouts   
overnight, possibly related with this medication sa noted #3. 2/11/24 Ca 11.1,   
trending down.    
    
5.  Hypokalemia: Admission K+ 3.4, recent magnesium level 2.3, repeat level in   
AM. Currently given his acute nausea/emesis holding oral regimen for now, giving  
judicious IVFs and once improved will supplement. 2/11/24 K+ 3.4,   
supplementation added to IVFs.    
    
6.  Hypernatremia, Hyperchloremia: 2/11/24 Na 146, Chl 116, as noted K also   
decreased, maintained on very judicious D5W with K+, continue to monitor CMP.    
    
7.  Nonalcoholic cirrhosis with underlying liver cancer with associated   
pancytopenia, chronic/chronic iron deficiency anemia: Patient following with Dr. Ortega, continued chronic oral iron supplementation, continued patient home   
metoprolol as BP allows, rifaximin. NH level 34; however, given constipation   
complaint noted to Neurology per patient will administer lactulose 20 mg BID   
regimen to titrate bowel movements, will continue to re-evaluate and alter as   
needed. 2/8/24 limited abdominal US with noted ascites in all quadrants. 2/9/24   
paracentesis performed with 8200 cc fluid removed and diagnostic evaluation   
studies requested including cytology. Given amount removed IV albumin ~6 g/L   
administered with 50 gm 25% albumin administered. Discussed case with Dr. Ortega   
and his service will see on Monday per patient daughter request. 2/11/24 CBC w/   
WBC 4.7, hemoglobin 10.0, .7, platelet 50 with lymphopenia.    
    
8.  Urinary retention with Love catheter placement: 2/8/2024-2/9/2024 patient   
with significant urinary retention with straight cath attempts however   
eventually Love catheter placement necessary, urinalysis obtained and   
unremarkable, Flomax started.  Will continue and make reattempts to de-escalate   
catheter however if continued require catheter may necessitate outpatient   
follow-up with urology.    
    
9.  PAF: Patient not chronically anticoagulated secondary to his underlying   
liver disease process with thrombocytopenia, maintained on diltiazem until temp   
hold with GI. Will encourage continued outpatient follow-up with cardiology as   
previously arranged. Will maintain on BID low dose BB with hold parameters until  
oral intake again allowed. GI consulted as noted. Amenable to NG d/c per their   
discretion pending their evaluation for possible upper endoscopy for assess of   
his stomach given concerns as noted.    
    
10.  Valvular heart disease: Patient status post AV bioprosthetic valve, echo   
7/23 with EF 60% with indeterminate diastolic function with severely dilated RV   
and global RV dysfunction, severe mitral annular calcification with RVSP 44   
mmHg.    
    
11.  Depression and anxiety: Will continue patient home sertraline regimen,   
given as able given NG status as noted.    
    
12.  GERD: We will continue patient on PPI, transitioned to IV with NG placement  
as noted.    
    
13.  Gout: We will continue patient home allopurinol regimen, given as able   
given NG status as noted    
    
14.  CAREN: BiPAP nightly.    
    
15.  DVT prophylaxis: SCDs. Holding administration of any chemoprophylaxis con  
cept given significant thrombocytopenia.    
    
16.  CODE STATUS: DNR-CCA, no intubation.    
    
Charges/Coding    
Visit Charges    
Inpatient E&M: [id number] [facility] L3

## 2024-02-11 NOTE — EX.PCM.CON.G
HPI
Consult Data
Date of Consult: 02/11/24
HPI Narrative
HPI Narrative: 
NENA MADRIGAL, is a 84-year-old male history of cirrhosis, liver cancer, GERD, pafib, anemia, gout CVA presented to [facility] ED 2/7/2024 from assisted living for altered mental status.  Reportedly he has not been himself since 
Sunday and at that time had been having some difficulty talking and at times slurred speech.

Family was concerned for stroke with patient due to his left leg weakness and not being able to talk for a period of time earlier.  CT brain in ED negative, ammonia 34, patiently mildly tachycardic.  Lab workup otherwise similar to patient's 
baseline/fairly benign.  Given there is still concern patient may have had recent stroke hospitalist contacted for admission for stroke rule out.  

-Admission labs included CBC with WBC 4.9, N 110.7, .3, platelet 58 with lymphopenia, CMP with chloride 110, BUN/creatinine 40/1.19, glucose 119, calcium 12.5, hepatic profile with alk phos 156, ammonia 34, .6, troponin 24,
 -chest x-ray with blunting right costophrenic angle with bibasilar atelectasis with increased markings right upper lobe abutting the right minor fissure of unclear significance, possibly atelectasis versus infiltrate, CT brain with chronic 
involutional changes,
- CTA head and neck with no significant major vaso-occlusive disease in the head or neck, evidence of a right upper lobe consolidation
- MRI of the brain with chronic involutional changes with no acute intracranial findings. 

[facility] teleneurology consulted and at this point they do believe that his presentation is likely infectious secondary to his new pneumonia however believe it could be related with his cirrhotic disease and possibly also hypercalcemia w/ recommendation 
of titrating bowel movements 2-3 daily w/ bowel regimen started;

Abdominal likely distended secondary to ascites confirmed on limited 2/8/24 US, 2/9/24 paracentesis performed with 8200 cc fluid removed and diagnostic evaluation studies requested including cytology. 

ST evaluation for moderate oropharyngeal dysphagia w/ Mechanical Soft Textures (Soft and bite size textures IDDSI Level 6) and Nectar-thick Liquids w/ Small Bites, Small Sips, Slow Rate, Alternate bites/solids and sips/liquids, Sitting upright and 
Remain sitting upright for 30 minutes after PO intake. 

ST recommendation for GI Consult outpatient for barium evaluation lower esophagus

 New onset intractable nausea and emesis, suspected Acute Gastritis, possible component Colitis:
- 2/10/24 placed NG given ongoing N/V. NPO status transitioned to IV PPI. 2/10/24 CT A/P obtained w/ noted moderate hiatal hernia with diffuse gastric wall thickening concerning for gastritis, cirrhotic liver with a 1.2 cm indeterminate mass at the 
dome with is known from prior, moderate right and mild left pleural effusions with bilateral lower lobe atelectasis, moderate ascites, varices, mild splenomegaly, evidence of cholelithiasis, multiple renal cysts, colonic diverticulosis without acute 
diverticulitis.

 As noted gastritis and colitis concerns, obtained c-diff and enteric which were negative. I was consulted for possible upper endoscopy for assess of his stomach 


[facility]
Medical History (Updated 02/09/24 @ 13:03 by Maribell Lopez, NP-C)

Anemia
Anemia in chronic kidney disease
Anemia of chronic disease
Aortic valve disorder
Asthma
Body mass index (BMI) 40.0-44.9, adult
Chronic acquired lymphedema
CKD (chronic kidney disease), stage III
Depression
Essential (primary) hypertension
GERD (gastroesophageal reflux disease)
Gout
History of cerebrovascular accident
History of splenomegaly
Hyperlipemia
Liver cancer
Morbid obesity
Multiple lung nodules
New onset atrial flutter
Non-alcoholic cirrhosis
Nonobstructive atherosclerosis of coronary artery
Nonrheumatic aortic (valve) stenosis
OAB (overactive bladder)
Obesity
Obesity (BMI 30-39.9)
CAREN treated with BiPAP
Osteoarthritis
Paroxysmal A-fib
PCK (polycystic kidney disease)
Peripheral neuropathy
Polycystic kidney disease
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
Right bundle branch block (RBBB)
Splenomegaly
Thrombocytopenia
Thrombocytopenia
Urge incontinence


Home Medications

allopurinol 300 mg tablet 300 mg PO DAILY gout 12/27/16 [History Last Taken 12/22/19]
fenofibrate nanocrystallized 48 mg tablet 48 mg PO DAILY cholesterol 12/27/16 [History Last Taken 12/22/19]
magnesium oxide 400 mg (241.3 mg magnesium) tablet 400 mg PO DAILY supplement 12/27/16 [History Last Taken 12/22/19]
multivit-min-folic acid 0.4 mg-lycopene 300 mcg-lutein 250 mcg tablet 1 ea PO DAILY vitamin 12/27/16 [History Last Taken 12/22/19]
sertraline 100 mg tablet 50 mg PO QHS depression 05/21/20 [History Last Taken Unknown]
aspirin 81 mg tablet,delayed release (Adult Aspirin Regimen) 81 mg PO DAILY heart 07/27/21 [History Last Taken Unknown]
ferrous sulfate 325 mg (65 mg iron) tablet 325 mg PO BID supplement 07/27/21 [History Last Taken Unknown]
albuterol sulfate 90 mcg/actuation aerosol inhaler 2 puff inhalation Q6H PRN ASTHMA 02/08/22 [History Last Taken Unknown]
fluticasone propionate 50 mcg/actuation nasal spray,suspension 1 spray intranasal DAILY nasal 02/08/22 [History Last Taken Unknown]
omeprazole 40 mg capsule,delayed release 40 mg PO DAILY reflux 02/08/22 [History Last Taken Unknown]
oxybutynin chloride 15 mg tablet,extended release 24 hr 30 mg PO DAILY bladder 02/08/22 [History Last Taken Unknown]
ascorbic acid (vitamin C) 500 mg capsule 500 mg PO BID supplement 01/31/23 [History Last Taken Unknown]
furosemide 20 mg tablet 20 mg PO Q OTHER DAY heart 01/31/23 [History Last Taken Unknown]
nitroglycerin 0.4 mg sublingual tablet 0.4 mg sublingual Q5M PRN chest pain 01/31/23 [History Last Taken Unknown]
cyanocobalamin (vitamin B-12) 5,000 mcg capsule 2,500 mcg PO DAILY supplement 07/15/23 [History Last Taken Unknown]
rifaximin 550 mg tablet (Xifaxan) 550 mg PO BID liver 07/15/23 [History Last Taken Unknown]
diltiazem HCl 120 mg capsule,extended release 24 hr 120 mg PO DAILY #30 caps 08/23/23 [Rx Last Taken Unknown]
metoprolol tartrate 25 mg tablet 25 mg PO BID #60 tabs 08/23/23 [Rx Last Taken Unknown]
fluticasone furoate 200 mcg-vilanterol 25 mcg/dose inhalation powder (Breo Ellipta) 1 inh inhalation DAILY breathing 12/21/23 [History Last Taken Unknown]
cefdinir 300 mg capsule 300 mg PO BID antibiotic 4 days #8 caps 12/26/23 [Rx Last Taken Unknown]
ferrous sulfate 325 mg (65 mg iron) tablet,delayed release mg PO 02/07/24 [History Last Taken Unknown]




Allergy/AdvReac Type Severity Reaction Status Date / Time
hydrocodone [From Vicodin] Allergy  Other Verified 02/07/24 14:53
strawberry Allergy  Hives Verified 02/07/24 14:53
venom-honey bee Allergy  Anaphylaxis Verified 02/07/24 14:53
[bee venom (honey bee)]     
adhesive tape AdvReac  Rash Verified 02/07/24 14:53
atorvastatin AdvReac  PT UNSURE Verified 02/07/24 14:53
   OF REACTION  
montelukast AdvReac  PT UNSURE Verified 02/07/24 14:53
   OF REACTION  


Family History (Reviewed 02/07/24 @ 16:45 by Dr. Ritchie Fernando, DO)
Father
 CAD (coronary artery disease)
Brother
 Diabetes
Mother
 Heart disease


Surgical History (Reviewed 02/07/24 @ 16:45 by Dr. Ritchie Fernando, DO)

Aortic valve replaced
History of aortic valve replacement with bioprosthetic valve (10/04/11)
History of hip surgery (05/2020)
History of knee replacement
History of left heart catheterization (09/21/11)
History of tonsillectomy
History of total left hip arthroplasty
History of total left hip replacement
History of umbilical hernia repair


Social History (Reviewed 02/07/24 @ 16:45 by Dr. Ritchie Fernando, DO)
household members:  none 
Smoking Status:  Never smoker 
alcohol intake:  never 
substance use type:  does not use 



ROS
ROS Narrative
General: Denies fever/chills
HENT: Denies headache, denies stuffy nose, denies sore throat
EYES: Denies changes in vision
Resp: Has had persistent cough, possibly some shortness of breath
Cardiac: Denies chest pain
GI: Denies abdominal pain, denies changes in bowel, denies nausea/vomiting
: Denies changes in urination
Extremity: Chronic lower extremity swelling
MSK: Chronic left lower extremity weakness but more so than usual and more so than right side
Neuro: Denies any numbness/tingling
Heme: Denies any bleeding or bruising
Skin: Denies rashes
Psychiatric: No complaints voiced

Physical Exam
Narrative
Physical Examination:
General: Awake, alert, oriented to self, place, recent events, is very nadir this morning mentioning he is having difficulty remembering given his prolonged hospitalization, he denies any abdominal pain or any recurrent nausea or emesis, tolerating 
NG.
Skin: Normal color, normal turgor, no icterus, no cyanosis except for occasional staged ecchymoses, stasis disease.
HEENT: AT/NC, EOMI, PERRLA, moderately dry MM, NG in place.
Lungs: Diminished, diffusely, greater posterior, mildly decreased effort, no evidence of any distress, no rales, ronchi or wheezing.
Heart: Regular rate and rhythm; no gallop, rub audible.
Abdomen: Soft, obese, status post paracentesis, nondistended appearing, NTTP, hyperactive BS. 
Extremities: No cyanosis, no clubbing, bilateral lower extremity not markedly pitting ankle edema, chronic component.
Neurological: Patient awake, alert, oriented as noted, cognitive function intact; pupils equally reactive to light and accommodation, cranial nerves grossly normal, moving all 4 extremities except significant left-sided hemiplegia left lower 
extremity, strength severely globally decreased.
Psychiatric: Affect appears fatigued but more talkative today, no acute evidence of depressive or anxiety feelings.

Lab / Micro Data

02/12/24 05:15          

02/12/24 05:15          

Labs: 
Laboratory Results - last 24 hr

02/09/24 05:45: Vit D 1,25-Dihydroxy 29.9
02/09/24 12:07: Fl Pathologist Comment Reviewed
02/12/24 05:15: WBC 4.4, RBC 2.88 L, Hgb 9.7 L, Hct 29.9 L, .8 H, MCH 33.7 H, MCHC 32.4, RDW Std Deviation 64.0 H, RDW Coeff of Ilia 16.8 H, Plt Count 55 L, MPV 11.4, Immature Gran % (Auto) 0.200, Neut % (Auto) 87.3 H, Lymph % (Auto) 5.2 L, 
Mono % (Auto) 4.1, Eos % (Auto) 2.7, Baso % (Auto) 0.5, Absolute Neuts (auto) 3.8, Absolute Lymphs (auto) 0.23 L, Nucleated RBC % 0, Sodium 146 H, Potassium 3.7, Chloride 116 H, Carbon Dioxide 27.0, Anion Gap 3 L, BUN 21 H, Creatinine 0.95, Estim 
Creat Clear Calc 69.56, Est GFR (MDRD) Af Amer 97, Est GFR (MDRD) Non-Af 80, BUN/Creatinine Ratio 22.1 H, Glucose 106, Calcium 10.8 H, Total Bilirubin 0.70, AST 22, ALT 18, Alkaline Phosphatase 140 H, Total Protein 4.7 L, Albumin 2.0 L, Globulin 
2.7, Albumin/Globulin Ratio 0.7 L


Micro: 
Microbiology

02/10/24 13:57   Stool   Enteric Bacteriology - Final



Assessment & Plan
Assessment/Plan
(1) Neurologic abnormality: 
(2) Gout: 
(3) GERD (gastroesophageal reflux disease): 
(4) Anemia of chronic disease: 
(5) Cirrhosis: 
QUALIFIERS:               Hepatic cirrhosis type: unspecified hepatic cirrhosis  Ascites presence: with ascites  Qualified Code(s): K74.60 - Unspecified cirrhosis of liver; R18.8 - Other ascites
(6) Obstructive sleep apnea on CPAP: 
PLAN: 
Plan
84-year-old gentleman with past medical history of Baxter cirrhosis complicated by hepatocellular carcinoma with malignant ascites currently status post large-volume paracentesis and NG tube placed due to intractable nausea vomiting

# Pancytopenia
-Patient routinely pancytopenic
-White blood cell count 4.9, lower limits of normal today though usually is below the 4.4 threshold
-Appears to be at baseline


#Liver cancer and cirrhosis
-Follows with Dr. Ortega

# Ascites
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

# Intractable nausea vomiting
-Likely secondary to large volume ascites.  Given the CT findings I will perform an upper endoscopy to evaluate his upper GI tract.
The family was explained alternatives, risk, benefits including not withstanding bleeding, infection, sepsis, perforation, need for emergent urgent death.  He will have an ASA of 3.



Charges/Coding
Visit Charges
Inpatient E&M: 14783 Init Hosp L3

## 2024-02-12 VITALS
RESPIRATION RATE: 18 BRPM | HEART RATE: 79 BPM | OXYGEN SATURATION: 98 % | DIASTOLIC BLOOD PRESSURE: 55 MMHG | SYSTOLIC BLOOD PRESSURE: 80 MMHG

## 2024-02-12 VITALS — HEART RATE: 94 BPM | RESPIRATION RATE: 15 BRPM

## 2024-02-12 VITALS
HEART RATE: 72 BPM | OXYGEN SATURATION: 100 % | TEMPERATURE: 97.88 F | DIASTOLIC BLOOD PRESSURE: 72 MMHG | SYSTOLIC BLOOD PRESSURE: 112 MMHG | RESPIRATION RATE: 18 BRPM

## 2024-02-12 VITALS
HEART RATE: 103 BPM | OXYGEN SATURATION: 88 % | DIASTOLIC BLOOD PRESSURE: 78 MMHG | RESPIRATION RATE: 18 BRPM | SYSTOLIC BLOOD PRESSURE: 105 MMHG | TEMPERATURE: 97.16 F

## 2024-02-12 VITALS
OXYGEN SATURATION: 92 % | SYSTOLIC BLOOD PRESSURE: 83 MMHG | HEART RATE: 75 BPM | TEMPERATURE: 96.98 F | DIASTOLIC BLOOD PRESSURE: 52 MMHG | RESPIRATION RATE: 18 BRPM

## 2024-02-12 VITALS
DIASTOLIC BLOOD PRESSURE: 58 MMHG | HEART RATE: 87 BPM | OXYGEN SATURATION: 97 % | SYSTOLIC BLOOD PRESSURE: 105 MMHG | RESPIRATION RATE: 18 BRPM

## 2024-02-12 VITALS — OXYGEN SATURATION: 88 %

## 2024-02-12 VITALS
RESPIRATION RATE: 16 BRPM | SYSTOLIC BLOOD PRESSURE: 95 MMHG | TEMPERATURE: 97.5 F | DIASTOLIC BLOOD PRESSURE: 75 MMHG | OXYGEN SATURATION: 96 % | HEART RATE: 75 BPM

## 2024-02-12 VITALS — HEART RATE: 103 BPM | DIASTOLIC BLOOD PRESSURE: 78 MMHG | SYSTOLIC BLOOD PRESSURE: 105 MMHG

## 2024-02-12 VITALS — RESPIRATION RATE: 14 BRPM | OXYGEN SATURATION: 95 % | HEART RATE: 72 BPM

## 2024-02-12 VITALS
DIASTOLIC BLOOD PRESSURE: 78 MMHG | TEMPERATURE: 97 F | OXYGEN SATURATION: 96 % | HEART RATE: 76 BPM | SYSTOLIC BLOOD PRESSURE: 105 MMHG | RESPIRATION RATE: 18 BRPM

## 2024-02-12 VITALS — RESPIRATION RATE: 14 BRPM | HEART RATE: 70 BPM

## 2024-02-12 LAB
1,25(OH)2D3 SERPL-MCNC: 29.9 PG/ML (ref 24.8–81.5)
ALANINE AMINOTRANSFER ALT/SGPT: 18 U/L (ref 16–61)
ALBUMIN SERPL-MCNC: 2 G/DL (ref 3.2–5)
ALKALINE PHOSPHATASE: 140 U/L (ref 45–117)
ANION GAP: 3 (ref 5–15)
AST(SGOT): 22 U/L (ref 15–37)
BUN SERPL-MCNC: 21 MG/DL (ref 7–18)
BUN/CREAT RATIO: 22.1 RATIO (ref 10–20)
CALCIUM SERPL-MCNC: 10.8 MG/DL (ref 8.5–10.1)
CARBON DIOXIDE: 27 MMOL/L (ref 21–32)
CHLORIDE: 116 MMOL/L (ref 98–107)
DEPRECATED RDW RBC: 64 FL (ref 35.1–43.9)
ERYTHROCYTE [DISTWIDTH] IN BLOOD: 16.8 % (ref 11.6–14.6)
EST GLOM FILT RATE - AFR AMER: 97 ML/MIN (ref 60–?)
ESTIMATED CREATININE CLEARANCE: 69.56 ML/MIN
GLOBULIN: 2.7 G/DL (ref 2.2–4.2)
GLUCOSE: 106 MG/DL (ref 74–106)
HCT VFR BLD AUTO: 29.9 % (ref 40–54)
HGB BLD-MCNC: 9.7 G/DL (ref 13–16.5)
IMMATURE GRANULOCYTES COUNT: 0.01 X10^3/UL (ref 0–0)
MCV RBC: 103.8 FL (ref 80–94)
MEAN CORP HGB CONC: 32.4 G/DL (ref 32–36)
MEAN PLATELET VOL.: 11.4 FL (ref 6.2–12)
NRBC FLAGGED BY ANALYZER: 0 % (ref 0–5)
PLATELET # BLD: 55 K/MM3 (ref 150–450)
POSITIVE COUNT: YES
POSITIVE DIFFERENTIAL: YES
POTASSIUM: 3.7 MMOL/L (ref 3.5–5.1)
RBC # BLD AUTO: 2.88 M/MM3 (ref 4.6–6.2)
WBC # BLD AUTO: 4.4 K/MM3 (ref 4.4–11)

## 2024-02-12 PROCEDURE — 0DJ08ZZ INSPECTION OF UPPER INTESTINAL TRACT, VIA NATURAL OR ARTIFICIAL OPENING ENDOSCOPIC: ICD-10-PCS | Performed by: INTERNAL MEDICINE

## 2024-02-12 RX ADMIN — ALBUTEROL SULFATE 2.5 MG: 2.5 SOLUTION RESPIRATORY (INHALATION) at 13:25

## 2024-02-12 RX ADMIN — PIPERACILLIN SODIUM AND TAZOBACTAM SODIUM 12.5 GM: 3; .375 INJECTION, POWDER, LYOPHILIZED, FOR SOLUTION INTRAVENOUS at 05:28

## 2024-02-12 RX ADMIN — ALBUTEROL SULFATE 2.5 MG: 2.5 SOLUTION RESPIRATORY (INHALATION) at 07:19

## 2024-02-12 RX ADMIN — ALBUTEROL SULFATE 2.5 MG: 2.5 SOLUTION RESPIRATORY (INHALATION) at 19:25

## 2024-02-12 RX ADMIN — PANTOPRAZOLE SODIUM 330 MG: 40 INJECTION, POWDER, FOR SOLUTION INTRAVENOUS at 10:04

## 2024-02-12 RX ADMIN — BUDESONIDE 0.25 MG: 0.25 SUSPENSION RESPIRATORY (INHALATION) at 19:25

## 2024-02-12 RX ADMIN — PANTOPRAZOLE SODIUM 330 MG: 40 INJECTION, POWDER, FOR SOLUTION INTRAVENOUS at 22:22

## 2024-02-12 RX ADMIN — PIPERACILLIN SODIUM AND TAZOBACTAM SODIUM 12.5 GM: 3; .375 INJECTION, POWDER, LYOPHILIZED, FOR SOLUTION INTRAVENOUS at 22:23

## 2024-02-12 RX ADMIN — PIPERACILLIN SODIUM AND TAZOBACTAM SODIUM 12.5 GM: 3; .375 INJECTION, POWDER, LYOPHILIZED, FOR SOLUTION INTRAVENOUS at 13:38

## 2024-02-12 NOTE — OP.CCLET_ITS
2/12/2024 
Júnior Ahuja 
[address] [place] [address] 
Re : Upper GI endoscopy procedure for Mitesh Braun 
Dear Dr. Ahuja 
This procedure was performed on Monday, February 12, 2024.  My impressions and 
 recommendations are as follows: 
Impressions :  
- Grade II esophageal varices.  Incompletely eradicated.  Banded.  
- Acute gastritis.  
- No gross lesions in the second portion of the duodenum.  
- No specimens collected. 
Recommendations :  
- Return patient to [facility] for ongoing care.  
- Full liquid diet.  
- Continue present medications. 
My findings are described in the full procedure note, which is enclosed.  If I 
 can be of further assistance, please feel free to contact me at . 
Sincerely, 
Brian Donohue,  
2/12/2024 4:49:29 PM 
This report has been signed electronically.

## 2024-02-12 NOTE — PN.HOSP_ITS
Reason for Visit    
Reason for Visit:     
Diagnoses    
    
Anemia in other chronic diseases classified elsewhere (02/08/24)    
Obstructive sleep apnea (adult) (pediatric) (02/08/24)    
Encephalopathy, unspecified (02/08/24)    
Gastro-esophageal reflux disease without esophagitis (02/08/24)    
Unspecified cirrhosis of liver (02/08/24)    
Gout, unspecified (02/08/24)    
Other ascites (02/08/24)    
Other symptoms and signs involving the nervous system (02/08/24)    
Dependence on other enabling machines and devices (02/08/24)    
    
    
    
Subjective    
Subjective    
Patient was seen and examined today, his daughter is in the room at the time of   
my examination.  Patient's NG tube was discontinued by GI today, he had an EGD   
performed which showed acute gastritis and esophageal varices.  According to the  
patient's daughter, patient will undergo another modified barium swallow   
tomorrow to see if he can eat.  I talked with oncology today (Dr. Ortega) he   
stated that he did not need to see the patient in consultation, he stated the   
patient's hepatoma was very small and it did look like it had responded to   
radiation treatment.  I relayed this to the patient's daughter.    
    
Objective Data    
Objective Data    
Vital Signs:     
Vital Signs    
    
    
    
Temp Pulse Resp BP Pulse Ox O2 Del Method O2 Flow Rate    
     
 97.0 F L  76   18   98/67   96   Nasal Cannula   3     
     
 02/12/24 16:55  02/12/24 16:55  02/12/24 16:55  02/12/24 16:55  02/12/24 16:55   
02/12/24 16:55  02/12/24 16:55    
    
    
    
FiO2    
     
 2     
     
 02/09/24 12:41    
    
    
    
    
Oxygen Flow Rate (L/min)         3                                                
       
Oxygen Delivery Method           Nasal Cannula                                    
       
Weight:                          102.9 kg                                         
       
Body Mass Index (BMI)            32.5                                             
       
    
    
Intake & Output:     
                       Intake and Output for Last 24 Hours    
    
    
    
 02/10/24 02/11/24 02/12/24    
    
 23:59 23:59 23:59    
     
Intake Total 590 / 590 1157.00 / 1157.00 1480 / 1480    
     
Output Total 1250 / 1650 1375 / 1525 9310 / 9310    
     
Balance -660 / -1060 -218.00 / -368.00 -7830 / -7830    
    
    
    
Lab / Micro Data    
    
                                                        02/12/24 05:15              
    
                                                        02/12/24 05:15              
    
Labs:     
                         Laboratory Results - last 24 hr    
    
02/09/24 05:45: Vit D 1,25-Dihydroxy 29.9    
02/09/24 12:07: Fl Pathologist Comment Reviewed    
02/12/24 05:15: WBC 4.4, RBC 2.88 L, Hgb 9.7 L, Hct 29.9 L, .8 H, MCH   
33.7 H, MCHC 32.4, RDW Std Deviation 64.0 H, RDW Coeff of Ilia 16.8 H, Plt Count   
55 L, MPV 11.4, Immature Gran % (Auto) 0.200, Neut % (Auto) 87.3 H, Lymph %   
(Auto) 5.2 L, Mono % (Auto) 4.1, Eos % (Auto) 2.7, Baso % (Auto) 0.5, Absolute   
Neuts (auto) 3.8, Absolute Lymphs (auto) 0.23 L, Nucleated RBC % 0, Sodium 146 H  
, Potassium 3.7, Chloride 116 H, Carbon Dioxide 27.0, Anion Gap 3 L, BUN 21 H,   
Creatinine 0.95, Estim Creat Clear Calc 69.56, Est GFR (MDRD) Af Amer 97, Est   
GFR (MDRD) Non-Af 80, BUN/Creatinine Ratio 22.1 H, Glucose 106, Calcium 10.8 H,   
Total Bilirubin 0.70, AST 22, ALT 18, Alkaline Phosphatase 140 H, Total Protein   
4.7 L, Albumin 2.0 L, Globulin 2.7, Albumin/Globulin Ratio 0.7 L    
    
    
Micro:     
                                  Microbiology    
    
02/10/24 13:57   Stool   Enteric Bacteriology - Final    
02/10/24 10:59   Stool   Clostridioides difficile (PCR) - Final    
02/08/24 16:11   Urine Catheter - Catheter   Legionella Antigen - Final    
02/08/24 16:11   Urine Catheter - Catheter   Streptococcus pneumoniae Antigen (M  
- Final    
02/07/24 15:15   Mucosa - Nose   SARS-CoV-2, Influenza & RSV (PCR) - Final    
    
    
    
Physical Exam    
Const    
alert and no apparent distress    
Constitutional Narrative:     
Patient appears unwell and frail    
General Appearance: cooperative, well kempt and well developed    
Orientation / Consciousness: awake, oriented to person and oriented to place    
HEENT    
normocephalic, head/scalp atraumatic and moist oral mucous membranes    
Eyes    
PERRL, EOMs intact bilaterally and conjunctivae normal    
Neck    
supple, no JVD, thyroid normal and no carotid bruits    
General: trachea midline    
Resp    
normal respiratory effort, no retractions, no use of accessory muscles and clear  
to auscultation bilaterally    
Auscultation: Negative for rales, rhonchi or wheezes    
Cardio    
regular rate, regular rhythm, S1 normal heart sound, S2 normal heart sound, no   
murmurs, no rub and no gallops    
GI    
normal to inspection, nondistended, normoactive bowel sounds, soft to palpation,  
non-tender and non-distended    
Extremity    
no clubbing, cyanosis or edema    
Skin    
no rashes or lesions noted    
General Skin Exam: no breakdown    
Neuro    
CN's II-XII intact bilaterally, moves all extremities, no focal motor deficits   
and no sensory deficits noted    
Sensorium / Orientation: awake, oriented to person and oriented to place    
Speech: speech normal    
Psych    
affect normal    
Psych Narrative:     
Patient appears tired and lethargic    
    
Assessment & Plan    
Assessment/Plan    
(1) Encephalopathy acute:     
PLAN:     
Plan    
1.  Acute metabolic encephalopathy-etiology unclear at this point, continue   
supportive care    
    
#2 hypercalcemia-etiology unclear at this time, calcium is trending downward at   
this time    
    
#3 cirrhosis-complicates care, medical course, recovery, and prognosis    
    
#4 generalized debility due to advanced age and multiple medical problems-PT and  
OT are seeing patient, he will need short-term placement in a skilled facility,   
he currently resides in assisted living    
    
#5 acute gastritis-patient is currently on a PPI    
    
#6 esophageal varices-banded by gastroenterology today    
    
#7 hepatic cancer-the patient's hepatic lesion is approximately 12 mm in   
diameter, I discussed this with the patient's oncologist today    
    
#8 urinary retention-patient currently has a Love catheter    
    
#9 paroxysmal atrial fibrillation-patient is not chronically anticoagulated   
secondary to underlying hepatic disease    
    
Total clinical time spent by myself addressing the patient's medical issues,   
reviewing all of his data, and collaborating with patient's care team: 35   
minutes    
    
Charges/Coding    
Visit Charges    
Inpatient E&M: 98606 Subs Hosp L2

## 2024-02-12 NOTE — NURSING
this nurse took over care for this patient at 0100. pt resting with eyes closed, call light within reach

## 2024-02-12 NOTE — PCM.PN.HOSP
Reason for Visit
Reason for Visit: 
Diagnoses

Anemia in other chronic diseases classified elsewhere (02/08/24)
Obstructive sleep apnea (adult) (pediatric) (02/08/24)
Encephalopathy, unspecified (02/08/24)
Gastro-esophageal reflux disease without esophagitis (02/08/24)
Unspecified cirrhosis of liver (02/08/24)
Gout, unspecified (02/08/24)
Other ascites (02/08/24)
Other symptoms and signs involving the nervous system (02/08/24)
Dependence on other enabling machines and devices (02/08/24)



Subjective
Subjective
Patient was seen and examined today, his daughter is in the room at the time of my examination.  Patient's NG tube was discontinued by GI today, he had an EGD performed which showed acute gastritis and esophageal varices.  According to the patient's 
daughter, patient will undergo another modified barium swallow tomorrow to see if he can eat.  I talked with oncology today (Dr. Ortega) he stated that he did not need to see the patient in consultation, he stated the patient's hepatoma was very 
small and it did look like it had responded to radiation treatment.  I relayed this to the patient's daughter.

Objective Data
Objective Data
Vital Signs: 
Vital Signs

Temp Pulse Resp BP Pulse Ox O2 Del Method O2 Flow Rate
 97.0 F L  76   18   98/67   96   Nasal Cannula   3 
 02/12/24 16:55  02/12/24 16:55  02/12/24 16:55  02/12/24 16:55  02/12/24 16:55  02/12/24 16:55  02/12/24 16:55
FiO2
 2 
 02/09/24 12:41



Oxygen Flow Rate (L/min)       3                                                
Oxygen Delivery Method         Nasal Cannula                                    
Weight:                        102.9 kg                                         
Body Mass Index (BMI)          32.5                                             


Intake & Output: 
Intake and Output for Last 24 Hours

 02/10/24 02/11/24 02/12/24
 23:59 23:59 23:59
Intake Total 590 / 590 1157.00 / 1157.00 1480 / 1480
Output Total 1250 / 1650 1375 / 1525 9310 / 9310
Balance -660 / -1060 -218.00 / -368.00 -7830 / -7830


Lab / Micro Data

02/12/24 05:15          

02/12/24 05:15          

Labs: 
Laboratory Results - last 24 hr

02/09/24 05:45: Vit D 1,25-Dihydroxy 29.9
02/09/24 12:07: Fl Pathologist Comment Reviewed
02/12/24 05:15: WBC 4.4, RBC 2.88 L, Hgb 9.7 L, Hct 29.9 L, .8 H, MCH 33.7 H, MCHC 32.4, RDW Std Deviation 64.0 H, RDW Coeff of Ilia 16.8 H, Plt Count 55 L, MPV 11.4, Immature Gran % (Auto) 0.200, Neut % (Auto) 87.3 H, Lymph % (Auto) 5.2 L, 
Mono % (Auto) 4.1, Eos % (Auto) 2.7, Baso % (Auto) 0.5, Absolute Neuts (auto) 3.8, Absolute Lymphs (auto) 0.23 L, Nucleated RBC % 0, Sodium 146 H, Potassium 3.7, Chloride 116 H, Carbon Dioxide 27.0, Anion Gap 3 L, BUN 21 H, Creatinine 0.95, Estim 
Creat Clear Calc 69.56, Est GFR (MDRD) Af Amer 97, Est GFR (MDRD) Non-Af 80, BUN/Creatinine Ratio 22.1 H, Glucose 106, Calcium 10.8 H, Total Bilirubin 0.70, AST 22, ALT 18, Alkaline Phosphatase 140 H, Total Protein 4.7 L, Albumin 2.0 L, Globulin 
2.7, Albumin/Globulin Ratio 0.7 L


Micro: 
Microbiology

02/10/24 13:57   Stool   Enteric Bacteriology - Final
02/10/24 10:59   Stool   Clostridioides difficile (PCR) - Final
02/08/24 16:11   Urine Catheter - Catheter   Legionella Antigen - Final
02/08/24 16:11   Urine Catheter - Catheter   Streptococcus pneumoniae Antigen (M - Final
02/07/24 15:15   Mucosa - Nose   SARS-CoV-2, Influenza & RSV (PCR) - Final



Physical Exam
Const
alert and no apparent distress
Constitutional Narrative: 
Patient appears unwell and frail
General Appearance: cooperative, well kempt and well developed
Orientation / Consciousness: awake, oriented to person and oriented to place
HEENT
normocephalic, head/scalp atraumatic and moist oral mucous membranes
Eyes
PERRL, EOMs intact bilaterally and conjunctivae normal
Neck
supple, no JVD, thyroid normal and no carotid bruits
General: trachea midline
Resp
normal respiratory effort, no retractions, no use of accessory muscles and clear to auscultation bilaterally
Auscultation: Negative for rales, rhonchi or wheezes
Cardio
regular rate, regular rhythm, S1 normal heart sound, S2 normal heart sound, no murmurs, no rub and no gallops
GI
normal to inspection, nondistended, normoactive bowel sounds, soft to palpation, non-tender and non-distended
Extremity
no clubbing, cyanosis or edema
Skin
no rashes or lesions noted
General Skin Exam: no breakdown
Neuro
CN's II-XII intact bilaterally, moves all extremities, no focal motor deficits and no sensory deficits noted
Sensorium / Orientation: awake, oriented to person and oriented to place
Speech: speech normal
Psych
affect normal
Psych Narrative: 
Patient appears tired and lethargic

Assessment & Plan
Assessment/Plan
(1) Encephalopathy acute: 
PLAN: 
Plan
1.  Acute metabolic encephalopathy-etiology unclear at this point, continue supportive care

#2 hypercalcemia-etiology unclear at this time, calcium is trending downward at this time

#3 cirrhosis-complicates care, medical course, recovery, and prognosis

#4 generalized debility due to advanced age and multiple medical problems-PT and OT are seeing patient, he will need short-term placement in a skilled facility, he currently resides in assisted living

#5 acute gastritis-patient is currently on a PPI

#6 esophageal varices-banded by gastroenterology today

#7 hepatic cancer-the patient's hepatic lesion is approximately 12 mm in diameter, I discussed this with the patient's oncologist today

#8 urinary retention-patient currently has a Love catheter

#9 paroxysmal atrial fibrillation-patient is not chronically anticoagulated secondary to underlying hepatic disease

Total clinical time spent by myself addressing the patient's medical issues, reviewing all of his data, and collaborating with patient's care team: 35 minutes

Charges/Coding
Visit Charges
Inpatient E&M: 95241 Subs Hosp L2

## 2024-02-12 NOTE — OP.EGD_ITS
Patient Name: Mitesh Braun 
Procedure Date: 2/12/2024 4:20 PM 
MRN: [id number] 
YOB: 1940 
Account Number: [id number] 
Age: 84 
Procedure:                    Upper GI endoscopy 
Indications:                  Iron deficiency anemia, Failure to respond to  
                              medical treatment, Cirrhosis with suspected  
                              esophageal varices 
Providers:                    Brian Donohue DO 
Medicines:                    Monitored Anesthesia Care 
Patient Profile:              This is an 84 year old male. Refer to note in  
                              patient chart for documentation of history and  
                              physical. Patient has symptoms of acute  
                              vomiting. 
Complications:                No immediate complications. 
Procedure:                    Pre-Anesthesia Assessment: 
                              - Prior to the procedure, a History and  
                              Physical was performed, and patient medications  
                              and allergies were reviewed. The patient is  
                              competent. The risks and benefits of the  
                              procedure and the sedation options and risks  
                              were discussed with the patient. All questions  
                              were answered and informed consent was  
                              obtained. Patient identification and proposed  
                              procedure were verified by the physician in the  
                              pre-procedure area. Mental Status Examination:  
                              alert and oriented. Airway Examination: normal  
                              oropharyngeal airway and neck mobility.  
                              Respiratory Examination: clear to auscultation.  
                              CV Examination: normal. Prophylactic  
                              Antibiotics: The patient does not require  
                              prophylactic antibiotics. Prior Anticoagulants:  
                              The patient has taken no anticoagulant or  
                              antiplatelet agents. ASA Grade Assessment: IV -  
                              A patient with severe systemic disease that is  
                              a constant threat to life. After reviewing the  
                              risks and benefits, the patient was deemed in  
                              satisfactory condition to undergo the  
                              procedure. The anesthesia plan was to use  
                              monitored anesthesia care (MAC). Immediately  
                              prior to administration of medications, the  
                              patient was re-assessed for adequacy to receive  
                              sedatives. The heart rate, respiratory rate,  
                              oxygen saturations, blood pressure, adequacy of  
                              pulmonary ventilation, and response to care  
                              were monitored throughout the procedure. The  
                              physical status of the patient was re-assessed  
                              after the procedure. 
                              After obtaining informed consent, the endoscope  
                              was passed under direct vision. Throughout the  
                              procedure, the patient's blood pressure, pulse,  
                              and oxygen saturations were monitored  
                              continuously. The Endoscope was introduced  
                              through the mouth, and advanced to the second  
                              part of duodenum. The upper GI endoscopy was  
                              accomplished without difficulty. The patient  
                              tolerated the procedure well. 
Scope In: 4:30:30 PM 
Scope Out: 4:33:48 PM 
Total Procedure Duration Time 0 hours 3 minutes 18 seconds  
Findings: 
     Grade II varices were found in the middle third of the esophagus and in  
     the lower third of the esophagus. They were 5 mm in largest diameter.  
     Three bands were successfully placed with incomplete eradication of  
     varices. There was no bleeding during the procedure. 
     Diffuse severe inflammation characterized by erythema and friability was  
     found in the entire examined stomach. 
     No gross lesions were noted in the second portion of the duodenum. 
Impression:                   - Grade II esophageal varices. Incompletely  
                              eradicated. Banded. 
                              - Acute gastritis. 
                              - No gross lesions in the second portion of the  
                              duodenum. 
                              - No specimens collected. 
Recommendation:               - [facility] patient to [facility] for [facility]  
                              care. 
                              - Full liquid diet. 
                              - Continue present medications. 
Procedure Code(s):            --- Professional --- 
                              50117, Esophagogastroduodenoscopy, flexible,  
                              transoral; with band ligation of  
                              esophageal/gastric varices 
CPT copyright 2022 [employer]. All rights reserved. 
The codes documented in this report are preliminary and upon  review may  
be revised to meet current compliance requirements. 
Brian Donohue DO 
2/12/2024 4:49:29 PM 
This report has been signed electronically. 
Number of Addenda: 0 
Note Initiated On: 2/12/2024 4:20 PM
Negative

## 2024-02-13 VITALS
OXYGEN SATURATION: 97 % | DIASTOLIC BLOOD PRESSURE: 64 MMHG | SYSTOLIC BLOOD PRESSURE: 108 MMHG | HEART RATE: 66 BPM | RESPIRATION RATE: 16 BRPM

## 2024-02-13 VITALS
OXYGEN SATURATION: 100 % | SYSTOLIC BLOOD PRESSURE: 108 MMHG | RESPIRATION RATE: 18 BRPM | DIASTOLIC BLOOD PRESSURE: 67 MMHG | HEART RATE: 66 BPM | TEMPERATURE: 97.6 F

## 2024-02-13 VITALS
DIASTOLIC BLOOD PRESSURE: 78 MMHG | HEART RATE: 133 BPM | TEMPERATURE: 97.52 F | SYSTOLIC BLOOD PRESSURE: 108 MMHG | OXYGEN SATURATION: 96 % | RESPIRATION RATE: 20 BRPM

## 2024-02-13 VITALS
SYSTOLIC BLOOD PRESSURE: 103 MMHG | TEMPERATURE: 97 F | HEART RATE: 125 BPM | RESPIRATION RATE: 18 BRPM | DIASTOLIC BLOOD PRESSURE: 90 MMHG | OXYGEN SATURATION: 96 %

## 2024-02-13 VITALS
RESPIRATION RATE: 16 BRPM | OXYGEN SATURATION: 95 % | HEART RATE: 68 BPM | DIASTOLIC BLOOD PRESSURE: 77 MMHG | SYSTOLIC BLOOD PRESSURE: 108 MMHG

## 2024-02-13 VITALS — HEART RATE: 125 BPM

## 2024-02-13 VITALS
TEMPERATURE: 97.16 F | DIASTOLIC BLOOD PRESSURE: 58 MMHG | SYSTOLIC BLOOD PRESSURE: 96 MMHG | OXYGEN SATURATION: 98 % | RESPIRATION RATE: 18 BRPM | HEART RATE: 66 BPM

## 2024-02-13 VITALS
DIASTOLIC BLOOD PRESSURE: 76 MMHG | HEART RATE: 67 BPM | RESPIRATION RATE: 18 BRPM | TEMPERATURE: 97.52 F | SYSTOLIC BLOOD PRESSURE: 102 MMHG | OXYGEN SATURATION: 96 %

## 2024-02-13 VITALS
SYSTOLIC BLOOD PRESSURE: 110 MMHG | RESPIRATION RATE: 16 BRPM | OXYGEN SATURATION: 97 % | HEART RATE: 63 BPM | DIASTOLIC BLOOD PRESSURE: 70 MMHG

## 2024-02-13 VITALS
RESPIRATION RATE: 18 BRPM | OXYGEN SATURATION: 98 % | DIASTOLIC BLOOD PRESSURE: 65 MMHG | HEART RATE: 67 BPM | TEMPERATURE: 97.8 F | SYSTOLIC BLOOD PRESSURE: 109 MMHG

## 2024-02-13 VITALS
SYSTOLIC BLOOD PRESSURE: 112 MMHG | DIASTOLIC BLOOD PRESSURE: 73 MMHG | RESPIRATION RATE: 16 BRPM | OXYGEN SATURATION: 96 % | HEART RATE: 75 BPM | TEMPERATURE: 97.5 F

## 2024-02-13 VITALS
TEMPERATURE: 97.52 F | OXYGEN SATURATION: 99 % | RESPIRATION RATE: 16 BRPM | DIASTOLIC BLOOD PRESSURE: 67 MMHG | SYSTOLIC BLOOD PRESSURE: 108 MMHG | HEART RATE: 66 BPM

## 2024-02-13 VITALS — OXYGEN SATURATION: 92 %

## 2024-02-13 VITALS — HEART RATE: 89 BPM | RESPIRATION RATE: 18 BRPM

## 2024-02-13 VITALS — RESPIRATION RATE: 17 BRPM | HEART RATE: 91 BPM

## 2024-02-13 VITALS — SYSTOLIC BLOOD PRESSURE: 109 MMHG | HEART RATE: 67 BPM | DIASTOLIC BLOOD PRESSURE: 65 MMHG

## 2024-02-13 LAB
ALBUMIN SERPL-MCNC: 2 G/DL (ref 3.2–5)
BUN SERPL-MCNC: 22 MG/DL (ref 7–18)
BUN/CREAT RATIO: 22.6 RATIO (ref 10–20)
CALCIUM SERPL-MCNC: 10.4 MG/DL (ref 8.5–10.1)
CARBON DIOXIDE: 27 MMOL/L (ref 21–32)
CHLORIDE: 117 MMOL/L (ref 98–107)
DEPRECATED RDW RBC: 63.5 FL (ref 35.1–43.9)
DIFFERENTIAL INDICATED: (no result)
ERYTHROCYTE [DISTWIDTH] IN BLOOD: 16.9 % (ref 11.6–14.6)
EST GLOM FILT RATE - AFR AMER: 94 ML/MIN (ref 60–?)
ESTIMATED CREATININE CLEARANCE: 68.16 ML/MIN
GLUCOSE: 90 MG/DL (ref 74–106)
HCT VFR BLD AUTO: 32.8 % (ref 40–54)
HGB BLD-MCNC: 10.5 G/DL (ref 13–16.5)
IMMATURE GRANULOCYTES COUNT: 0.01 X10^3/UL (ref 0–0)
MCV RBC: 102.5 FL (ref 80–94)
MEAN CORP HGB CONC: 32 G/DL (ref 32–36)
MEAN PLATELET VOL.: 11.6 FL (ref 6.2–12)
NRBC FLAGGED BY ANALYZER: 0 % (ref 0–5)
PLATELET # BLD: 52 K/MM3 (ref 150–450)
POSITIVE COUNT: YES
POSITIVE DIFFERENTIAL: YES
POTASSIUM: 3.6 MMOL/L (ref 3.5–5.1)
RBC # BLD AUTO: 3.2 M/MM3 (ref 4.6–6.2)
WBC # BLD AUTO: 4.6 K/MM3 (ref 4.4–11)

## 2024-02-13 RX ADMIN — LIDOCAINE HYDROCHLORIDE 0 ML: 20 INJECTION, SOLUTION INFILTRATION; PERINEURAL at 14:27

## 2024-02-13 RX ADMIN — PIPERACILLIN SODIUM AND TAZOBACTAM SODIUM 12.5 GM: 3; .375 INJECTION, POWDER, LYOPHILIZED, FOR SOLUTION INTRAVENOUS at 06:10

## 2024-02-13 RX ADMIN — ALBUMIN (HUMAN) 60 GM: 0.25 INJECTION, SOLUTION INTRAVENOUS at 18:53

## 2024-02-13 RX ADMIN — PANTOPRAZOLE SODIUM 330 MG: 40 INJECTION, POWDER, FOR SOLUTION INTRAVENOUS at 21:09

## 2024-02-13 RX ADMIN — PIPERACILLIN SODIUM AND TAZOBACTAM SODIUM 12.5 GM: 3; .375 INJECTION, POWDER, LYOPHILIZED, FOR SOLUTION INTRAVENOUS at 22:12

## 2024-02-13 RX ADMIN — ALBUTEROL SULFATE 2.5 MG: 2.5 SOLUTION RESPIRATORY (INHALATION) at 06:51

## 2024-02-13 RX ADMIN — Medication 1 PACKET: at 15:39

## 2024-02-13 RX ADMIN — PANTOPRAZOLE SODIUM 330 MG: 40 INJECTION, POWDER, FOR SOLUTION INTRAVENOUS at 09:55

## 2024-02-13 RX ADMIN — SODIUM CHLORIDE, PRESERVATIVE FREE 0 ML: 5 INJECTION INTRAVENOUS at 21:00

## 2024-02-13 RX ADMIN — TOLTERODINE TARTRATE 4 MG: 4 CAPSULE, EXTENDED RELEASE ORAL at 09:51

## 2024-02-13 RX ADMIN — PIPERACILLIN SODIUM AND TAZOBACTAM SODIUM 12.5 GM: 3; .375 INJECTION, POWDER, LYOPHILIZED, FOR SOLUTION INTRAVENOUS at 13:36

## 2024-02-13 RX ADMIN — ALBUTEROL SULFATE 2.5 MG: 2.5 SOLUTION RESPIRATORY (INHALATION) at 12:59

## 2024-02-13 RX ADMIN — ALBUMIN (HUMAN) 60 GM: 0.25 INJECTION, SOLUTION INTRAVENOUS at 16:46

## 2024-02-13 RX ADMIN — Medication 1 PACKET: at 21:12

## 2024-02-13 RX ADMIN — SODIUM CHLORIDE, PRESERVATIVE FREE 0 ML: 5 INJECTION INTRAVENOUS at 16:47

## 2024-02-13 NOTE — PCM.PN.HOSP
Reason for Visit
Reason for Visit: 
Diagnoses

Anemia in other chronic diseases classified elsewhere (02/08/24)
Obstructive sleep apnea (adult) (pediatric) (02/08/24)
Encephalopathy, unspecified (02/08/24)
Gastro-esophageal reflux disease without esophagitis (02/08/24)
Unspecified cirrhosis of liver (02/08/24)
Gout, unspecified (02/08/24)
Malignant ascites (02/08/24)
Other ascites (02/08/24)
Other symptoms and signs involving the nervous system (02/08/24)
Dependence on other enabling machines and devices (02/08/24)



Subjective
Subjective
Patient was seen and examined today, I discussed his care with gastroenterology today briefly, I had the patient undergo paracentesis today and 6200 cc of fluid was removed.  After discussing his care with gastroenterology, I placed the patient on 
spironolactone and Lasix.  I talked to speech therapy briefly today, they did not think the patient needed to repeat modified barium swallow performed.

Objective Data
Objective Data
Vital Signs: 
Vital Signs

Temp Pulse Resp BP Pulse Ox O2 Del Method O2 Flow Rate
 97.6 F L  66   16   108/67   99   Nasal Cannula   3 
 02/13/24 15:10  02/13/24 15:10  02/13/24 15:10  02/13/24 15:10  02/13/24 15:10  02/13/24 15:10  02/13/24 15:10
FiO2
 2 
 02/09/24 12:41



Oxygen Flow Rate (L/min)       3                                                
Oxygen Delivery Method         Nasal Cannula                                    
Weight:                        103 kg                                           
Body Mass Index (BMI)          32.5                                             


Intake & Output: 
Intake and Output for Last 24 Hours

 02/11/24 02/12/24 02/13/24
 23:59 23:59 23:59
Intake Total 1157.00 / 1157.00 1590 / 1590 450 / 450
Output Total 1375 / 1525 9310 / 9610 7000 / 7000
Balance -218.00 / -368.00 -7720 / -8020 -6550 / -6550


Lab / Micro Data

02/13/24 05:55          

02/13/24 05:55          

Labs: 
Laboratory Results - last 24 hr

02/13/24 05:55: WBC 4.6, RBC 3.20 L, Hgb 10.5 L, Hct 32.8 L, .5 H, MCH 32.8 H, MCHC 32.0, RDW Std Deviation 63.5 H, RDW Coeff of Ilia 16.9 H, Plt Count 52 L, MPV 11.6, Immature Gran % (Auto) 0.200, Neut % (Auto) 86.7 H, Lymph % (Auto) 5.7 L, 
Mono % (Auto) 3.9, Eos % (Auto) 2.8, Baso % (Auto) 0.7, Absolute Neuts (auto) 4.0, Absolute Lymphs (auto) 0.26 L, Nucleated RBC % 0, Sodium 147 H, Potassium 3.6, Chloride 117 H, Carbon Dioxide 27.0, BUN 22 H, Creatinine 0.97, Estim Creat Clear Calc 
68.16, Est GFR (MDRD) Af Amer 94, Est GFR (MDRD) Non-Af 78, BUN/Creatinine Ratio 22.6 H, Glucose 90, Calcium 10.4 H, Phosphorus 1.5 L, Albumin 2.0 L


Micro: 
Microbiology

02/12/24 17:24   Sputum, Expectorated/Coughed   Gram Stain - Final
02/12/24 17:24   Sputum, Expectorated/Coughed   Respiratory Culture - Preliminary
                            Appears to be normal respiratory carina.
                            Further studies to follow.
02/10/24 13:57   Stool   Enteric Bacteriology - Final
02/10/24 10:59   Stool   Clostridioides difficile (PCR) - Final
02/08/24 16:11   Urine Catheter - Catheter   Legionella Antigen - Final
02/08/24 16:11   Urine Catheter - Catheter   Streptococcus pneumoniae Antigen (M - Final
02/07/24 15:15   Mucosa - Nose   SARS-CoV-2, Influenza & RSV (PCR) - Final



Physical Exam
Const
no apparent distress
Constitutional Narrative: 
Patient is lethargic today but responds to verbal cues
General Appearance: cooperative and well developed
Orientation / Consciousness: awake
HEENT
normocephalic and head/scalp atraumatic
Eyes
PERRL, EOMs intact bilaterally and conjunctivae normal
Neck
supple, no JVD, thyroid normal and no carotid bruits
General: trachea midline
Resp
normal respiratory effort, no retractions, no use of accessory muscles and clear to auscultation bilaterally
Auscultation: Negative for rales, rhonchi or wheezes
Cardio
regular rate, regular rhythm, S1 normal heart sound, S2 normal heart sound, no murmurs, no rub and no gallops
GI
normal to inspection, nondistended, normoactive bowel sounds, soft to palpation and non-tender
GI Narrative: 
At the time my examination, there was moderate abdominal distention noted, abdomen was soft and nontender
Extremity
no clubbing, cyanosis or edema
Skin
no rashes or lesions noted
General Skin Exam: no breakdown
Neuro
CN's II-XII intact bilaterally, moves all extremities, no focal motor deficits and no sensory deficits noted
Sensorium / Orientation: awake
Speech: speech normal
Psych
Psych Narrative: 
Patient has flat affect

Assessment & Plan
Assessment/Plan
(1) Encephalopathy acute: 
PLAN: 
Plan
1.  Acute metabolic encephalopathy-etiology unclear at this point, continue supportive care

#2 hypercalcemia-etiology unclear at this time, calcium is trending downward at this time

#3 cirrhosis-complicates care, medical course, recovery, and prognosis, patient was placed on Aldactone and Lasix today, again patient underwent a paracentesis today with removal of 6200 cc of fluid.

#4 generalized debility due to advanced age and multiple medical problems-PT and OT are seeing patient, he will need short-term placement in a skilled facility, he currently resides in assisted living

#5 acute gastritis-patient is currently on a PPI

#6 esophageal varices-banded by gastroenterology 

#7 hepatic cancer-the patient's hepatic lesion is approximately 12 mm in diameter, I discussed this with the patient's oncologist

#8 urinary retention-patient currently has a Love catheter

#9 paroxysmal atrial fibrillation-patient is not chronically anticoagulated secondary to underlying hepatic disease

Total clinical time spent by myself addressing the patient's medical issues, reviewing all of his data, and collaborating with patient's care team: 25 minutes

Charges/Coding
Visit Charges
Inpatient E&M: 11636 [facility] Hosp L1

## 2024-02-13 NOTE — PN.HOSP_ITS
Reason for Visit    
Reason for Visit:     
Diagnoses    
    
Anemia in other chronic diseases classified elsewhere (02/08/24)    
Obstructive sleep apnea (adult) (pediatric) (02/08/24)    
Encephalopathy, unspecified (02/08/24)    
Gastro-esophageal reflux disease without esophagitis (02/08/24)    
Unspecified cirrhosis of liver (02/08/24)    
Gout, unspecified (02/08/24)    
Malignant ascites (02/08/24)    
Other ascites (02/08/24)    
Other symptoms and signs involving the nervous system (02/08/24)    
Dependence on other enabling machines and devices (02/08/24)    
    
    
    
Subjective    
Subjective    
Patient was seen and examined today, I discussed his care with gastroenterology   
today briefly, I had the patient undergo paracentesis today and 6200 cc of fluid  
was removed.  After discussing his care with gastroenterology, I placed the   
patient on spironolactone and Lasix.  I talked to speech therapy briefly today,   
they did not think the patient needed to repeat modified barium swallow   
performed.    
    
Objective Data    
Objective Data    
Vital Signs:     
Vital Signs    
    
    
    
Temp Pulse Resp BP Pulse Ox O2 Del Method O2 Flow Rate    
     
 97.6 F L  66   16   108/67   99   Nasal Cannula   3     
     
 02/13/24 15:10  02/13/24 15:10  02/13/24 15:10  02/13/24 15:10  02/13/24 15:10   
02/13/24 15:10  02/13/24 15:10    
    
    
    
FiO2    
     
 2     
     
 02/09/24 12:41    
    
    
    
    
Oxygen Flow Rate (L/min)         3                                                
       
Oxygen Delivery Method           Nasal Cannula                                    
       
Weight:                          103 kg                                           
       
Body Mass Index (BMI)            32.5                                             
       
    
    
Intake & Output:     
                       Intake and Output for Last 24 Hours    
    
    
    
 02/11/24 02/12/24 02/13/24    
    
 23:59 23:59 23:59    
     
Intake Total 1157.00 / 1157.00 1590 / 1590 450 / 450    
     
Output Total 1375 / 1525 9310 / 9610 7000 / 7000    
     
Balance -218.00 / -368.00 -7720 / -8020 -6550 / -6550    
    
    
    
Lab / Micro Data    
    
                                                        02/13/24 05:55              
    
                                                        02/13/24 05:55              
    
Labs:     
                         Laboratory Results - last 24 hr    
    
02/13/24 05:55: WBC 4.6, RBC 3.20 L, Hgb 10.5 L, Hct 32.8 L, .5 H, MCH   
32.8 H, MCHC 32.0, RDW Std Deviation 63.5 H, RDW Coeff of Ilia 16.9 H, Plt Count   
52 L, MPV 11.6, Immature Gran % (Auto) 0.200, Neut % (Auto) 86.7 H, Lymph %   
(Auto) 5.7 L, Mono % (Auto) 3.9, Eos % (Auto) 2.8, Baso % (Auto) 0.7, Absolute   
Neuts (auto) 4.0, Absolute Lymphs (auto) 0.26 L, Nucleated RBC % 0, Sodium 147 H  
, Potassium 3.6, Chloride 117 H, Carbon Dioxide 27.0, BUN 22 H, Creatinine 0.97,  
Estim Creat Clear Calc 68.16, Est GFR (MDRD) Af Amer 94, Est GFR (MDRD) Non-Af   
78, BUN/Creatinine Ratio 22.6 H, Glucose 90, Calcium 10.4 H, Phosphorus 1.5 L,   
Albumin 2.0 L    
    
    
Micro:     
                                  Microbiology    
    
02/12/24 17:24   Sputum, Expectorated/Coughed   Gram Stain - Final    
02/12/24 17:24   Sputum, Expectorated/Coughed   Respiratory Culture -   
Preliminary    
                            Appears to be normal respiratory carina.    
                            Further studies to follow.    
02/10/24 13:57   Stool   Enteric Bacteriology - Final    
02/10/24 10:59   Stool   Clostridioides difficile (PCR) - Final    
02/08/24 16:11   Urine Catheter - Catheter   Legionella Antigen - Final    
02/08/24 16:11   Urine Catheter - Catheter   Streptococcus pneumoniae Antigen (M  
- Final    
02/07/24 15:15   Mucosa - Nose   SARS-CoV-2, Influenza & RSV (PCR) - Final    
    
    
    
Physical Exam    
Const    
no apparent distress    
Constitutional Narrative:     
Patient is lethargic today but responds to verbal cues    
General Appearance: cooperative and well developed    
Orientation / Consciousness: awake    
HEENT    
normocephalic and head/scalp atraumatic    
Eyes    
PERRL, EOMs intact bilaterally and conjunctivae normal    
Neck    
supple, no JVD, thyroid normal and no carotid bruits    
General: trachea midline    
Resp    
normal respiratory effort, no retractions, no use of accessory muscles and clear  
to auscultation bilaterally    
Auscultation: Negative for rales, rhonchi or wheezes    
Cardio    
regular rate, regular rhythm, S1 normal heart sound, S2 normal heart sound, no   
murmurs, no rub and no gallops    
GI    
normal to inspection, nondistended, normoactive bowel sounds, soft to palpation   
and non-tender    
GI Narrative:     
At the time my examination, there was moderate abdominal distention noted,   
abdomen was soft and nontender    
Extremity    
no clubbing, cyanosis or edema    
Skin    
no rashes or lesions noted    
General Skin Exam: no breakdown    
Neuro    
CN's II-XII intact bilaterally, moves all extremities, no focal motor deficits   
and no sensory deficits noted    
Sensorium / Orientation: awake    
Speech: speech normal    
Psych    
Psych Narrative:     
Patient has flat affect    
    
Assessment & Plan    
Assessment/Plan    
(1) Encephalopathy acute:     
PLAN:     
Plan    
1.  Acute metabolic encephalopathy-etiology unclear at this point, continue   
supportive care    
    
#2 hypercalcemia-etiology unclear at this time, calcium is trending downward at   
this time    
    
#3 cirrhosis-complicates care, medical course, recovery, and prognosis, patient   
was placed on Aldactone and Lasix today, again patient underwent a paracentesis   
today with removal of 6200 cc of fluid.    
    
#4 generalized debility due to advanced age and multiple medical problems-PT and  
OT are seeing patient, he will need short-term placement in a skilled facility,   
he currently resides in assisted living    
    
#5 acute gastritis-patient is currently on a PPI    
    
#6 esophageal varices-banded by gastroenterology     
    
#7 hepatic cancer-the patient's hepatic lesion is approximately 12 mm in   
diameter, I discussed this with the patient's oncologist    
    
#8 urinary retention-patient currently has a Love catheter    
    
#9 paroxysmal atrial fibrillation-patient is not chronically anticoagulated   
secondary to underlying hepatic disease    
    
Total clinical time spent by myself addressing the patient's medical issues,   
reviewing all of his data, and collaborating with patient's care team: 25   
minutes    
    
Charges/Coding    
Visit Charges    
Inpatient E&M: 51471 [facility] Hosp L1

## 2024-02-13 NOTE — EX.PCM.PN.GI
Subjective
Subjective
Patient underwent large-volume paracentesis and had 6300 mL of serosanguineous fluid removed.

Objective Data
Objective Data
Vital Signs: 
Vital Signs

Temp Pulse Resp BP Pulse Ox O2 Del Method O2 Flow Rate
 97.6 F L  66   16   108/67   99   Nasal Cannula   3 
 02/13/24 15:10  02/13/24 15:10  02/13/24 15:10  02/13/24 15:10  02/13/24 15:10  02/13/24 15:10  02/13/24 15:10
FiO2
 2 
 02/09/24 12:41



Oxygen Flow Rate (L/min)       3                                                
Oxygen Delivery Method         Nasal Cannula                                    
Weight:                        227 lb 1.218 oz                                  
Body Mass Index (BMI)          32.5                                             


Intake & Output: 
Intake and Output for Last 24 Hours

 02/11/24 02/12/24 02/13/24
 23:59 23:59 23:59
Intake Total 1157.00 / 1157.00 1590 / 1590 450 / 450
Output Total 1375 / 1525 9310 / 9610 7000 / 7000
Balance -218.00 / -368.00 -7720 / -8020 -6550 / -6550


Lab / Micro Data

02/13/24 05:55          

02/13/24 05:55          

Labs: 
Laboratory Results - last 24 hr

02/13/24 05:55: WBC 4.6, RBC 3.20 L, Hgb 10.5 L, Hct 32.8 L, .5 H, MCH 32.8 H, MCHC 32.0, RDW Std Deviation 63.5 H, RDW Coeff of Ilia 16.9 H, Plt Count 52 L, MPV 11.6, Immature Gran % (Auto) 0.200, Neut % (Auto) 86.7 H, Lymph % (Auto) 5.7 L, 
Mono % (Auto) 3.9, Eos % (Auto) 2.8, Baso % (Auto) 0.7, Absolute Neuts (auto) 4.0, Absolute Lymphs (auto) 0.26 L, Nucleated RBC % 0, Sodium 147 H, Potassium 3.6, Chloride 117 H, Carbon Dioxide 27.0, BUN 22 H, Creatinine 0.97, Estim Creat Clear Calc 
68.16, Est GFR (MDRD) Af Amer 94, Est GFR (MDRD) Non-Af 78, BUN/Creatinine Ratio 22.6 H, Glucose 90, Calcium 10.4 H, Phosphorus 1.5 L, Albumin 2.0 L


Micro: 
Microbiology

02/12/24 17:24   Sputum, Expectorated/Coughed   Gram Stain - Final
02/12/24 17:24   Sputum, Expectorated/Coughed   Respiratory Culture - Preliminary
                            Appears to be normal respiratory carina.
                            Further studies to follow.
02/10/24 13:57   Stool   Enteric Bacteriology - Final
02/10/24 10:59   Stool   Clostridioides difficile (PCR) - Final
02/08/24 16:11   Urine Catheter - Catheter   Legionella Antigen - Final
02/08/24 16:11   Urine Catheter - Catheter   Streptococcus pneumoniae Antigen (M - Final
02/07/24 15:15   Mucosa - Nose   SARS-CoV-2, Influenza & RSV (PCR) - Final



Physical Exam
Const
no apparent distress
General Appearance: cooperative and well developed
Orientation / Consciousness: awake
HEENT
normocephalic and head/scalp atraumatic
Eyes
PERRL, EOMs intact bilaterally and conjunctivae normal
Neck
supple, no JVD, thyroid normal and no carotid bruits
General: trachea midline
Resp
normal respiratory effort, no retractions, no use of accessory muscles and clear to auscultation bilaterally
Auscultation: Negative for rales, rhonchi or wheezes
Cardio
regular rate, regular rhythm, S1 normal heart sound, S2 normal heart sound, no murmurs, no rub and no gallops
GI
normal to inspection, nondistended, normoactive bowel sounds, soft to palpation and non-tender
GI Narrative: 
At the time my examination, there was moderate abdominal distention noted, abdomen was soft and nontender
Extremity
no clubbing, cyanosis or edema
Skin
no rashes or lesions noted
General Skin Exam: no breakdown
Neuro
CN's II-XII intact bilaterally, moves all extremities, no focal motor deficits and no sensory deficits noted
Sensorium / Orientation: awake
Speech: speech normal
Psych
Psych Narrative: 
Patient has flat affect

Assessment & Plan
Assessment/Plan
(1) Encephalopathy acute: 
PLAN: 
Plan
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

 Acute Encephalopathy likely secondary to decompensated liver disease.  Recommendations are Xifaxan 550 mg p.o. twice daily and lactulose 30 cc every 6 hours around-the-clock.  His current MELD is low at 16.  He is a child [place] class C.

Nonalcoholic cirrhosis with underlying liver cancer with associated pancytopenia, chronic/chronic iron deficiency anemia: Patient following with Dr. Ortega, 
- 2/8/24 limited abdominal US with noted ascites in all quadrants. 
- 2/9/24 paracentesis performed with 8200 cc fluid removed and diagnostic evaluation studies requested including cytology. Given amount removed IV albumin ~6 g/L administered with 50 gm 25% albumin administered. 
- 2/13/24 6300 serosanguineous fluid removed.  He will need albumin.  Recommend to start him on midodrine 10 mg p.o. twice daily as it can be associated with decreased amount of ascites as formed along with starting him on 40 mg of Lasix and 50 mg 
of Aldactone.  It can be titrated up as an outpatient hopefully to keep him from having recurrent paracentesis.



Charges/Coding
Visit Charges
Inpatient E&M: 16483 Subs Hosp L3

## 2024-02-13 NOTE — PCM.OP.PRO
Procedure Report
Date of Procedure: 02/13/24

Assessment & Plan
Assessment/Plan
(1) Ascites: 
QUALIFIERS:               Ascites type: malignant  Qualified Code(s): R18.0 - Malignant ascites
PLAN: 
PROCEDURE: Ultrasound guided paracentesis

ORDERING PROVIDER: Dr. Hancock

INDICATION: Male, 84 years old.  Ascites with history of liver cancer.

PROVIDER: NUBIA Cabrales
_______________________________________

TECHNIQUE:

The risks, benefits, and alternatives to the procedure were explained to the patient and daughter via telephone.  Consent for procedure was obtained from daughter over the phone.. The specific risks of bleeding, infection, and damage to bowel were 
detailed and accepted. Witnessed informed consent was obtained.

The abdomen was ultrasonographically surveyed. An appropriate pocket of fluid was identified in the right upper quadrant. The skin was prepped with chlorhexidine and sterile field established.  2% lidocaine was used for local anesthetic.  

Using ultrasound guidance, the peritoneal cavity was accessed with a 5-[place] paracentesis needle/catheter system. The trocar was removed. A total of 6300 ml of clear yellow colored fluid was removed from the peritoneal cavity. The catheter was 
removed and a sterile dressing was applied.

The procedure was well tolerated.
_________________________________________

IMPRESSION:

Successful ultrasound-guided paracentesis with right upper quadrant access site.

Procedures Radiology
Radiology US Procedures: 99100 Paracentesis

## 2024-02-13 NOTE — PN.GI_ITS
Subjective    
Subjective    
Patient underwent large-volume paracentesis and had 6300 mL of serosanguineous   
fluid removed.    
    
Objective Data    
Objective Data    
Vital Signs:     
Vital Signs    
    
    
    
Temp Pulse Resp BP Pulse Ox O2 Del Method O2 Flow Rate    
     
 97.6 F L  66   16   108/67   99   Nasal Cannula   3     
     
 02/13/24 15:10  02/13/24 15:10  02/13/24 15:10  02/13/24 15:10  02/13/24 15:10   
02/13/24 15:10  02/13/24 15:10    
    
    
    
FiO2    
     
 2     
     
 02/09/24 12:41    
    
    
    
    
Oxygen Flow Rate (L/min)         3                                                
       
Oxygen Delivery Method           Nasal Cannula                                    
       
Weight:                          227 lb 1.218 oz                                  
       
Body Mass Index (BMI)            32.5                                             
       
    
    
Intake & Output:     
                       Intake and Output for Last 24 Hours    
    
    
    
 02/11/24 02/12/24 02/13/24    
    
 23:59 23:59 23:59    
     
Intake Total 1157.00 / 1157.00 1590 / 1590 450 / 450    
     
Output Total 1375 / 1525 9310 / 9610 7000 / 7000    
     
Balance -218.00 / -368.00 -7720 / -8020 -6550 / -6550    
    
    
    
Lab / Micro Data    
    
                                                        02/13/24 05:55              
    
                                                        02/13/24 05:55              
    
Labs:     
                         Laboratory Results - last 24 hr    
    
02/13/24 05:55: WBC 4.6, RBC 3.20 L, Hgb 10.5 L, Hct 32.8 L, .5 H, MCH   
32.8 H, MCHC 32.0, RDW Std Deviation 63.5 H, RDW Coeff of Ilia 16.9 H, Plt Count   
52 L, MPV 11.6, Immature Gran % (Auto) 0.200, Neut % (Auto) 86.7 H, Lymph %   
(Auto) 5.7 L, Mono % (Auto) 3.9, Eos % (Auto) 2.8, Baso % (Auto) 0.7, Absolute   
Neuts (auto) 4.0, Absolute Lymphs (auto) 0.26 L, Nucleated RBC % 0, Sodium 147 H  
, Potassium 3.6, Chloride 117 H, Carbon Dioxide 27.0, BUN 22 H, Creatinine 0.97,  
Estim Creat Clear Calc 68.16, Est GFR (MDRD) Af Amer 94, Est GFR (MDRD) Non-Af   
78, BUN/Creatinine Ratio 22.6 H, Glucose 90, Calcium 10.4 H, Phosphorus 1.5 L,   
Albumin 2.0 L    
    
    
Micro:     
                                  Microbiology    
    
02/12/24 17:24   Sputum, Expectorated/Coughed   Gram Stain - Final    
02/12/24 17:24   Sputum, Expectorated/Coughed   Respiratory Culture -   
Preliminary    
                            Appears to be normal respiratory carina.    
                            Further studies to follow.    
02/10/24 13:57   Stool   Enteric Bacteriology - Final    
02/10/24 10:59   Stool   Clostridioides difficile (PCR) - Final    
02/08/24 16:11   Urine Catheter - Catheter   Legionella Antigen - Final    
02/08/24 16:11   Urine Catheter - Catheter   Streptococcus pneumoniae Antigen (M  
- Final    
02/07/24 15:15   Mucosa - Nose   SARS-CoV-2, Influenza & RSV (PCR) - Final    
    
    
    
Physical Exam    
Const    
no apparent distress    
General Appearance: cooperative and well developed    
Orientation / Consciousness: awake    
HEENT    
normocephalic and head/scalp atraumatic    
Eyes    
PERRL, EOMs intact bilaterally and conjunctivae normal    
Neck    
supple, no JVD, thyroid normal and no carotid bruits    
General: trachea midline    
Resp    
normal respiratory effort, no retractions, no use of accessory muscles and clear  
to auscultation bilaterally    
Auscultation: Negative for rales, rhonchi or wheezes    
Cardio    
regular rate, regular rhythm, S1 normal heart sound, S2 normal heart sound, no   
murmurs, no rub and no gallops    
GI    
normal to inspection, nondistended, normoactive bowel sounds, soft to palpation   
and non-tender    
GI Narrative:     
At the time my examination, there was moderate abdominal distention noted,   
abdomen was soft and nontender    
Extremity    
no clubbing, cyanosis or edema    
Skin    
no rashes or lesions noted    
General Skin Exam: no breakdown    
Neuro    
CN's II-XII intact bilaterally, moves all extremities, no focal motor deficits   
and no sensory deficits noted    
Sensorium / Orientation: awake    
Speech: speech normal    
Psych    
Psych Narrative:     
Patient has flat affect    
    
Assessment & Plan    
Assessment/Plan    
(1) Encephalopathy acute:     
PLAN:     
Plan    
The patient is an 83 y/o M w/ PMHx: Chronic anemia/AOCD, CKD sP, Non-alcoholic   
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   
thrombocytopenia/anemia/mild hyperammonemia complicated by decompensated liver   
disease leading to refractory ascites.    
    
 Acute Encephalopathy likely secondary to decompensated liver disease.    
Recommendations are Xifaxan 550 mg p.o. twice daily and lactulose 30 cc every 6   
hours around-the-clock.  His current MELD is low at 16.  He is a child [place]   
class C.    
    
Nonalcoholic cirrhosis with underlying liver cancer with associated   
pancytopenia, chronic/chronic iron deficiency anemia: Patient following with Dr. Ortega,     
- 2/8/24 limited abdominal US with noted ascites in all quadrants.     
- 2/9/24 paracentesis performed with 8200 cc fluid removed and diagnostic   
evaluation studies requested including cytology. Given amount removed IV albumin  
~6 g/L administered with 50 gm 25% albumin administered.     
- 2/13/24 6300 serosanguineous fluid removed.  He will need albumin.  Recommend   
to start him on midodrine 10 mg p.o. twice daily as it can be associated with   
decreased amount of ascites as formed along with starting him on 40 mg of Lasix   
and 50 mg of Aldactone.  It can be titrated up as an outpatient hopefully to   
keep him from having recurrent paracentesis.    
    
    
    
Charges/Coding    
Visit Charges    
Inpatient E&M: 43064 Subs Hosp L3

## 2024-02-14 VITALS
DIASTOLIC BLOOD PRESSURE: 68 MMHG | RESPIRATION RATE: 19 BRPM | SYSTOLIC BLOOD PRESSURE: 89 MMHG | HEART RATE: 138 BPM | OXYGEN SATURATION: 97 %

## 2024-02-14 VITALS
DIASTOLIC BLOOD PRESSURE: 61 MMHG | HEART RATE: 134 BPM | RESPIRATION RATE: 22 BRPM | OXYGEN SATURATION: 94 % | SYSTOLIC BLOOD PRESSURE: 77 MMHG

## 2024-02-14 VITALS
DIASTOLIC BLOOD PRESSURE: 55 MMHG | OXYGEN SATURATION: 97 % | SYSTOLIC BLOOD PRESSURE: 73 MMHG | RESPIRATION RATE: 20 BRPM | HEART RATE: 134 BPM

## 2024-02-14 VITALS
RESPIRATION RATE: 15 BRPM | SYSTOLIC BLOOD PRESSURE: 62 MMHG | DIASTOLIC BLOOD PRESSURE: 50 MMHG | HEART RATE: 117 BPM | OXYGEN SATURATION: 93 %

## 2024-02-14 VITALS
HEART RATE: 129 BPM | DIASTOLIC BLOOD PRESSURE: 43 MMHG | TEMPERATURE: 97 F | SYSTOLIC BLOOD PRESSURE: 65 MMHG | RESPIRATION RATE: 19 BRPM | OXYGEN SATURATION: 94 %

## 2024-02-14 VITALS
OXYGEN SATURATION: 92 % | SYSTOLIC BLOOD PRESSURE: 77 MMHG | DIASTOLIC BLOOD PRESSURE: 60 MMHG | RESPIRATION RATE: 21 BRPM | HEART RATE: 134 BPM

## 2024-02-14 VITALS
DIASTOLIC BLOOD PRESSURE: 48 MMHG | OXYGEN SATURATION: 96 % | SYSTOLIC BLOOD PRESSURE: 86 MMHG | RESPIRATION RATE: 15 BRPM | HEART RATE: 137 BPM

## 2024-02-14 VITALS
TEMPERATURE: 97.34 F | SYSTOLIC BLOOD PRESSURE: 91 MMHG | HEART RATE: 133 BPM | RESPIRATION RATE: 20 BRPM | DIASTOLIC BLOOD PRESSURE: 62 MMHG | OXYGEN SATURATION: 97 %

## 2024-02-14 VITALS
OXYGEN SATURATION: 95 % | RESPIRATION RATE: 19 BRPM | HEART RATE: 134 BPM | SYSTOLIC BLOOD PRESSURE: 68 MMHG | DIASTOLIC BLOOD PRESSURE: 58 MMHG

## 2024-02-14 VITALS
RESPIRATION RATE: 18 BRPM | HEART RATE: 125 BPM | SYSTOLIC BLOOD PRESSURE: 64 MMHG | DIASTOLIC BLOOD PRESSURE: 48 MMHG | OXYGEN SATURATION: 95 %

## 2024-02-14 VITALS
TEMPERATURE: 98.24 F | OXYGEN SATURATION: 93 % | DIASTOLIC BLOOD PRESSURE: 58 MMHG | RESPIRATION RATE: 19 BRPM | SYSTOLIC BLOOD PRESSURE: 71 MMHG | HEART RATE: 134 BPM

## 2024-02-14 VITALS
SYSTOLIC BLOOD PRESSURE: 90 MMHG | OXYGEN SATURATION: 92 % | DIASTOLIC BLOOD PRESSURE: 71 MMHG | RESPIRATION RATE: 17 BRPM | HEART RATE: 129 BPM

## 2024-02-14 VITALS — HEART RATE: 136 BPM | RESPIRATION RATE: 20 BRPM

## 2024-02-14 VITALS
RESPIRATION RATE: 18 BRPM | SYSTOLIC BLOOD PRESSURE: 91 MMHG | DIASTOLIC BLOOD PRESSURE: 66 MMHG | HEART RATE: 134 BPM | OXYGEN SATURATION: 96 %

## 2024-02-14 VITALS
RESPIRATION RATE: 20 BRPM | DIASTOLIC BLOOD PRESSURE: 82 MMHG | TEMPERATURE: 98.78 F | OXYGEN SATURATION: 92 % | SYSTOLIC BLOOD PRESSURE: 98 MMHG | HEART RATE: 129 BPM

## 2024-02-14 VITALS
OXYGEN SATURATION: 92 % | RESPIRATION RATE: 18 BRPM | SYSTOLIC BLOOD PRESSURE: 62 MMHG | HEART RATE: 124 BPM | DIASTOLIC BLOOD PRESSURE: 50 MMHG

## 2024-02-14 VITALS
HEART RATE: 133 BPM | RESPIRATION RATE: 13 BRPM | DIASTOLIC BLOOD PRESSURE: 42 MMHG | OXYGEN SATURATION: 96 % | SYSTOLIC BLOOD PRESSURE: 61 MMHG

## 2024-02-14 VITALS — OXYGEN SATURATION: 94 %

## 2024-02-14 VITALS — DIASTOLIC BLOOD PRESSURE: 68 MMHG | HEART RATE: 128 BPM | SYSTOLIC BLOOD PRESSURE: 94 MMHG

## 2024-02-14 VITALS
SYSTOLIC BLOOD PRESSURE: 94 MMHG | TEMPERATURE: 98.4 F | RESPIRATION RATE: 18 BRPM | HEART RATE: 104 BPM | DIASTOLIC BLOOD PRESSURE: 61 MMHG | OXYGEN SATURATION: 92 %

## 2024-02-14 VITALS
DIASTOLIC BLOOD PRESSURE: 62 MMHG | RESPIRATION RATE: 17 BRPM | OXYGEN SATURATION: 96 % | HEART RATE: 134 BPM | SYSTOLIC BLOOD PRESSURE: 78 MMHG

## 2024-02-14 VITALS — HEART RATE: 136 BPM | SYSTOLIC BLOOD PRESSURE: 85 MMHG | RESPIRATION RATE: 16 BRPM | DIASTOLIC BLOOD PRESSURE: 69 MMHG

## 2024-02-14 VITALS
OXYGEN SATURATION: 96 % | HEART RATE: 134 BPM | SYSTOLIC BLOOD PRESSURE: 77 MMHG | DIASTOLIC BLOOD PRESSURE: 52 MMHG | RESPIRATION RATE: 19 BRPM

## 2024-02-14 VITALS
SYSTOLIC BLOOD PRESSURE: 68 MMHG | OXYGEN SATURATION: 91 % | HEART RATE: 134 BPM | RESPIRATION RATE: 24 BRPM | DIASTOLIC BLOOD PRESSURE: 55 MMHG

## 2024-02-14 VITALS — DIASTOLIC BLOOD PRESSURE: 55 MMHG | SYSTOLIC BLOOD PRESSURE: 67 MMHG

## 2024-02-14 VITALS
DIASTOLIC BLOOD PRESSURE: 51 MMHG | SYSTOLIC BLOOD PRESSURE: 67 MMHG | HEART RATE: 137 BPM | OXYGEN SATURATION: 94 % | RESPIRATION RATE: 19 BRPM

## 2024-02-14 VITALS — SYSTOLIC BLOOD PRESSURE: 56 MMHG | DIASTOLIC BLOOD PRESSURE: 45 MMHG | HEART RATE: 136 BPM

## 2024-02-14 VITALS — SYSTOLIC BLOOD PRESSURE: 60 MMHG | DIASTOLIC BLOOD PRESSURE: 40 MMHG

## 2024-02-14 VITALS — OXYGEN SATURATION: 96 %

## 2024-02-14 LAB
ALANINE AMINOTRANSFER ALT/SGPT: 57 U/L (ref 16–61)
ALBUMIN SERPL-MCNC: 2.5 G/DL (ref 3.2–5)
ALKALINE PHOSPHATASE: 532 U/L (ref 45–117)
ANION GAP: 8 (ref 5–15)
ANISOCYTOSIS BLD QL SMEAR: (no result)
AST(SGOT): 137 U/L (ref 15–37)
BILIRUB DIRECT SERPL-MCNC: 3.05 MG/DL (ref 0–0.3)
BUN SERPL-MCNC: 24 MG/DL (ref 7–18)
BUN/CREAT RATIO: 15.8 RATIO (ref 10–20)
CALCIUM SERPL-MCNC: 10.8 MG/DL (ref 8.5–10.1)
CARBON DIOXIDE: 24 MMOL/L (ref 21–32)
CHLORIDE: 116 MMOL/L (ref 98–107)
DEPRECATED RDW RBC: 65.1 FL (ref 35.1–43.9)
DIFFERENTIAL INDICATED: (no result)
ERYTHROCYTE [DISTWIDTH] IN BLOOD: 17.3 % (ref 11.6–14.6)
EST GLOM FILT RATE - AFR AMER: 57 ML/MIN (ref 60–?)
ESTIMATED CREATININE CLEARANCE: 42.47 ML/MIN
GLOBULIN: 2.2 G/DL (ref 2.2–4.2)
GLUCOSE: 92 MG/DL (ref 74–106)
HCT VFR BLD AUTO: 33.2 % (ref 40–54)
HGB BLD-MCNC: 10.9 G/DL (ref 13–16.5)
IMMATURE GRANULOCYTES COUNT: 0.04 X10^3/UL (ref 0–0)
MCV RBC: 103.4 FL (ref 80–94)
MEAN CORP HGB CONC: 32.8 G/DL (ref 32–36)
MEAN PLATELET VOL.: 11.5 FL (ref 6.2–12)
NRBC FLAGGED BY ANALYZER: 0 % (ref 0–5)
PLATELET # BLD: 58 K/MM3 (ref 150–450)
POSITIVE COUNT: YES
POSITIVE DIFFERENTIAL: YES
POSITIVE MORPHOLOGY: YES
POTASSIUM: 3.8 MMOL/L (ref 3.5–5.1)
RBC # BLD AUTO: 3.21 M/MM3 (ref 4.6–6.2)
SCAN SMEAR PER REVIEW CRITERIA: (no result)
WBC # BLD AUTO: 11.9 K/MM3 (ref 4.4–11)

## 2024-02-14 RX ADMIN — SODIUM CHLORIDE 999 ML: 9 INJECTION, SOLUTION INTRAVENOUS at 15:26

## 2024-02-14 RX ADMIN — ALBUTEROL SULFATE 2.5 MG: 2.5 SOLUTION RESPIRATORY (INHALATION) at 13:49

## 2024-02-14 RX ADMIN — SODIUM CHLORIDE 999 ML: 9 INJECTION, SOLUTION INTRAVENOUS at 14:12

## 2024-02-14 RX ADMIN — SODIUM CHLORIDE, PRESERVATIVE FREE 0 ML: 5 INJECTION INTRAVENOUS at 11:48

## 2024-02-14 RX ADMIN — ALBUMIN (HUMAN) 60 GM: 0.25 INJECTION, SOLUTION INTRAVENOUS at 02:50

## 2024-02-14 RX ADMIN — PIPERACILLIN SODIUM AND TAZOBACTAM SODIUM 12.5 GM: 3; .375 INJECTION, POWDER, LYOPHILIZED, FOR SOLUTION INTRAVENOUS at 15:27

## 2024-02-14 RX ADMIN — ALBUMIN (HUMAN) 60 GM: 0.25 INJECTION, SOLUTION INTRAVENOUS at 09:28

## 2024-02-14 RX ADMIN — ALBUTEROL SULFATE 2.5 MG: 2.5 SOLUTION RESPIRATORY (INHALATION) at 07:40

## 2024-02-14 RX ADMIN — SODIUM CHLORIDE, PRESERVATIVE FREE 0 ML: 5 INJECTION INTRAVENOUS at 04:32

## 2024-02-14 RX ADMIN — SODIUM CHLORIDE 125 ML: 9 INJECTION, SOLUTION INTRAVENOUS at 14:05

## 2024-02-14 RX ADMIN — PIPERACILLIN SODIUM AND TAZOBACTAM SODIUM 12.5 GM: 3; .375 INJECTION, POWDER, LYOPHILIZED, FOR SOLUTION INTRAVENOUS at 06:11

## 2024-02-14 RX ADMIN — PANTOPRAZOLE SODIUM 330 MG: 40 INJECTION, POWDER, FOR SOLUTION INTRAVENOUS at 11:56

## 2024-02-14 RX ADMIN — SODIUM CHLORIDE, PRESERVATIVE FREE 0 ML: 5 INJECTION INTRAVENOUS at 03:31

## 2024-02-14 RX ADMIN — SODIUM CHLORIDE 999 ML: 9 INJECTION, SOLUTION INTRAVENOUS at 13:00

## 2024-02-14 NOTE — CASEMGMT
Physician spoke with patient's daughter Dixie and son in law about Hospice. Dixie is open to talking with Hospice. SW met with patient. SW confirmed Dixie is open to talking with Hospice. Dixie would like to talk with Hospice today. ELDA called Tricia 
with Hospice and made the referral. SW also e-mailed Tricia the referral.

Jessica Gibbs MSW RODNEY

## 2024-02-14 NOTE — EKG12_ITS
Test Reason : TACHY 
Blood Pressure : ***/*** mmHG 
Vent. Rate : 136 BPM     Atrial Rate : 000 BPM 
   P-R Int : 000 ms          QRS Dur : 156 ms 
    QT Int : 376 ms       P-R-T Axes : 000 239 062 degrees 
   QTc Int : 565 ms 
  
PROBABLE ATRIAL FLUTTER WITH 2:1 CONDUCTION 
Right bundle branch block 
Abnormal ECG 
  
Confirmed by Daniel Zamora ([id number]),  CHARI QUINTEROS ([id number]) on 2/14/2024 1:36:02 PM 
  
Referred By:  milton FAYE           Confirmed By:Daniel Zamora

## 2024-02-14 NOTE — PN.HOSP_ITS
Reason for Visit    
Reason for Visit:     
Diagnoses    
    
Anemia in other chronic diseases classified elsewhere (02/08/24)    
Obstructive sleep apnea (adult) (pediatric) (02/08/24)    
Encephalopathy, unspecified (02/08/24)    
Gastro-esophageal reflux disease without esophagitis (02/08/24)    
Unspecified cirrhosis of liver (02/08/24)    
Gout, unspecified (02/08/24)    
Malignant ascites (02/08/24)    
Other ascites (02/08/24)    
Other symptoms and signs involving the nervous system (02/08/24)    
Dependence on other enabling machines and devices (02/08/24)    
    
    
    
Subjective    
Subjective    
Patient was seen and examined today, last night his blood pressure went low and   
he was given albumin and fluids, today his blood pressure has trended downward,   
as of this afternoon his blood pressure was in the 60s systolic, I administered   
IV fluids but this did not impact his blood pressure.  I had a long conversation  
with the patient's POA who came up to see the patient after I called her on the   
phone, her  also came to the hospital with her, I told her that the   
options were to transfer him to the ICU to place him on pressors but that he has  
severe liver disease and that ultimately elevating his blood pressure will not   
be of help in the long run due to his severe liver disease.  The other option   
would be to administer fluids and PCU and not transport the patient to the unit,  
the final option would be to withdraw care and just provide comfort care   
measures.  I told her I believed the patient had end-stage liver disease.     
    
 She was open to having hospice come in and talk with her, hospice came in today  
and felt that the patient was actively dying and they would not okay transport   
to the inpatient hospice facility.  Patient's POA elected to make him a comfort   
care, I placed him on IV morphine and atropine.    
    
I talked briefly with gastroenterology, they confirmed that the patient's   
cirrhosis was advanced.    
    
Objective Data    
Objective Data    
Vital Signs:     
Vital Signs    
    
    
    
Temp Pulse Resp BP Pulse Ox O2 Del Method O2 Flow Rate    
     
 98.7 F   125 H  18   67/55 L  92   Nasal Cannula   5     
     
 02/14/24 12:00  02/14/24 12:30  02/14/24 12:30  02/14/24 13:00  02/14/24 12:00   
02/14/24 14:00  02/14/24 14:00    
    
    
    
FiO2    
     
 2     
     
 02/09/24 12:41    
    
    
    
    
Oxygen Flow Rate (L/min)         5                                                
       
Oxygen Delivery Method           Nasal Cannula                                    
       
Weight:                          98 kg                                            
       
Body Mass Index (BMI)            30.9                                             
       
    
    
Intake & Output:     
                       Intake and Output for Last 24 Hours    
    
    
    
 02/12/24 02/13/24 02/14/24    
    
 23:59 23:59 23:59    
     
Intake Total 1590 / 1590 910 / 910 3855.42 / 3855.42    
     
Output Total 9310 / 9610 8100 / 8100 350 / 350    
     
Balance -7720 / -8020 -7190 / -7190 3505.42 / 3505.42    
    
    
    
Lab / Micro Data    
    
                                                        02/14/24 13:07              
    
                                                        02/14/24 06:45              
    
Labs:     
                         Laboratory Results - last 24 hr    
    
02/14/24 06:45: Sodium 148 H, Potassium 3.8, Chloride 116 H, Carbon Dioxide   
24.0, Anion Gap 8, BUN 24 H, Creatinine 1.52 H, Estim Creat Clear Calc 42.47,   
Est GFR (MDRD) Af Amer 57 L, Est GFR (MDRD) Non-Af 47 L, BUN/Creatinine Ratio   
15.8, Glucose 92, Calcium 10.8 H    
02/14/24 13:07: WBC 11.9 H, RBC 3.21 L, Hgb 10.9 L, Hct 33.2 L, .4 H, MCH  
34.0 H, MCHC 32.8, RDW Std Deviation 65.1 H, RDW Coeff of Ilia 17.3 H, Plt Count   
58 L, MPV 11.5, Immature Gran % (Auto) 0.300, Neut % (Auto) 97.8 H, Lymph %   
(Auto) 0.8 L, Mono % (Auto) 0.6, Eos % (Auto) 0.2, Baso % (Auto) 0.3, Absolute   
Neuts (auto) 11.7 H, Absolute Lymphs (auto) 0.09 L, Nucleated RBC % 0,   
Anisocytosis 2+, Total Bilirubin 4.00 H, Direct Bilirubin 3.05 H,  H, ALT  
57, Alkaline Phosphatase 532 H, Total Protein 4.7 L, Albumin 2.5 L, Globulin 2.2    
    
    
Micro:     
                                  Microbiology    
    
02/12/24 17:24   Sputum, Expectorated/Coughed   Gram Stain - Final    
02/12/24 17:24   Sputum, Expectorated/Coughed   Respiratory Culture -   
Preliminary    
                            Gram negative rosales    
                            Presumptive C albicans    
02/10/24 13:57   Stool   Enteric Bacteriology - Final    
02/10/24 10:59   Stool   Clostridioides difficile (PCR) - Final    
02/08/24 16:11   Urine Catheter - Catheter   Legionella Antigen - Final    
02/08/24 16:11   Urine Catheter - Catheter   Streptococcus pneumoniae Antigen (M  
- Final    
02/07/24 15:15   Mucosa - Nose   SARS-CoV-2, Influenza & RSV (PCR) - Final    
    
    
    
Physical Exam    
Const    
Constitutional Narrative:     
Patient is lethargic, he has trouble speaking, patient looks frail and unwell    
HEENT    
normocephalic and head/scalp atraumatic    
Eyes    
PERRL and EOMs intact bilaterally    
Neck    
no JVD and thyroid normal    
General: trachea midline    
Resp    
Resp Narrative:     
Patient has expiratory rhonchi bilaterally    
Auscultation: rales, rhonchi throughout and wheezes    
Cardio    
Cardio Narrative:     
Heart rate and rhythm is tachycardic and irregular    
GI    
normal to inspection, nondistended, normoactive bowel sounds, soft to palpation,  
non-tender and non-distended    
Extremity    
Extremity Narrative:     
Generalized edema is noted of the lower extremities    
Skin    
no rashes or lesions noted    
General Skin Exam: no breakdown    
Neuro    
CN's II-XII intact bilaterally    
Neuro Narrative:     
Patient is lethargic, he does open his eyes to verbal stimulation and tactile   
stimulation, he does not carry on a conversation    
Psych    
Psych Narrative:     
Patient is lethargic and somnolent    
    
Assessment & Plan    
Assessment/Plan    
(1) Encephalopathy acute:     
PLAN:     
Plan    
1.  Acute metabolic encephalopathy-due to end-stage liver disease, patient   
appears to be actively dying at this time, he was made a comfort care and he   
will be reevaluated tomorrow morning for possible transfer to hospice if his   
pressure stabilizes    
    
#2 hypercalcemia-etiology unclear at this time    
    
#3  End-stage cirrhosis    
    
#4 generalized debility due to advanced age and multiple medical problems    
    
#5 acute gastritis-patient is currently on a PPI    
    
#6 esophageal varices-banded by gastroenterology     
    
#7 hepatic cancer-the patient's hepatic lesion is approximately 12 mm in   
diameter    
    
#8 urinary retention-patient currently has a Love catheter    
    
#9 paroxysmal atrial fibrillation-patient is not chronically anticoagulated   
secondary to underlying hepatic disease    
    
Prognosis is poor at this time, comfort measures are instituted, patient is a   
DNR comfort care at this time    
    
Total clinical time spent by myself addressing the patient's medical issues,   
reviewing all of his data, and collaborating with patient's care team: 35   
minutes    
    
Charges/Coding    
Visit Charges    
Inpatient E&M: 18594 Subs Hosp L2

## 2024-02-14 NOTE — EX.PCM.PN.GI
Subjective
Subjective
Patient is very lethargic and weak.  He has not been eating much.  He has had persistently low blood pressures today.  He is currently getting fluid bolus and has been responding.

Objective Data
Objective Data
Vital Signs: 
Vital Signs

Temp Pulse Resp BP Pulse Ox O2 Del Method O2 Flow Rate
 98.7 F   125 H  18   67/55 L  92   Nasal Cannula   5 
 02/14/24 12:00  02/14/24 12:30  02/14/24 12:30  02/14/24 13:00  02/14/24 12:00  02/14/24 14:00  02/14/24 14:00
FiO2
 2 
 02/09/24 12:41



Oxygen Flow Rate (L/min)       5                                                
Oxygen Delivery Method         Nasal Cannula                                    
Weight:                        216 lb 0.848 oz                                  
Body Mass Index (BMI)          30.9                                             


Intake & Output: 
Intake and Output for Last 24 Hours

 02/12/24 02/13/24 02/14/24
 23:59 23:59 23:59
Intake Total 1590 / 1590 910 / 910 3470 / 3470
Output Total 9310 / 9610 8100 / 8100 350 / 350
Balance -7720 / -8020 -7190 / -7190 3120 / 3120


Lab / Micro Data

02/14/24 13:07          

02/14/24 06:45          

Labs: 
Laboratory Results - last 24 hr

02/14/24 06:45: Sodium 148 H, Potassium 3.8, Chloride 116 H, Carbon Dioxide 24.0, Anion Gap 8, BUN 24 H, Creatinine 1.52 H, Estim Creat Clear Calc 42.47, Est GFR (MDRD) Af Amer 57 L, Est GFR (MDRD) Non-Af 47 L, BUN/Creatinine Ratio 15.8, Glucose 92, 
Calcium 10.8 H
02/14/24 13:07: WBC 11.9 H, RBC 3.21 L, Hgb 10.9 L, Hct 33.2 L, .4 H, MCH 34.0 H, MCHC 32.8, RDW Std Deviation 65.1 H, RDW Coeff of Ilia 17.3 H, Plt Count 58 L, MPV 11.5, Immature Gran % (Auto) 0.300, Neut % (Auto) 97.8 H, Lymph % (Auto) 0.8 
L, Mono % (Auto) 0.6, Eos % (Auto) 0.2, Baso % (Auto) 0.3, Absolute Neuts (auto) 11.7 H, Absolute Lymphs (auto) 0.09 L, Nucleated RBC % 0, Anisocytosis 2+, Total Bilirubin 4.00 H, Direct Bilirubin 3.05 H,  H, ALT 57, Alkaline Phosphatase 532 
H, Total Protein 4.7 L, Albumin 2.5 L, Globulin 2.2


Micro: 
Microbiology

02/12/24 17:24   Sputum, Expectorated/Coughed   Gram Stain - Final
02/12/24 17:24   Sputum, Expectorated/Coughed   Respiratory Culture - Preliminary
                            Gram negative rosales
                            Presumptive C albicans
02/10/24 13:57   Stool   Enteric Bacteriology - Final
02/10/24 10:59   Stool   Clostridioides difficile (PCR) - Final
02/08/24 16:11   Urine Catheter - Catheter   Legionella Antigen - Final
02/08/24 16:11   Urine Catheter - Catheter   Streptococcus pneumoniae Antigen (M - Final
02/07/24 15:15   Mucosa - Nose   SARS-CoV-2, Influenza & RSV (PCR) - Final



Physical Exam
Narrative
Lethargic and answers questions very slowly
Const
no apparent distress
General Appearance: cooperative and well developed
Orientation / Consciousness: awake
HEENT
normocephalic and head/scalp atraumatic
Eyes
PERRL, EOMs intact bilaterally and conjunctivae normal
Neck
supple, no JVD, thyroid normal and no carotid bruits
General: trachea midline
Resp
normal respiratory effort, no retractions, no use of accessory muscles and clear to auscultation bilaterally
Auscultation: Negative for rales, rhonchi or wheezes
Cardio
regular rate, regular rhythm, S1 normal heart sound, S2 normal heart sound, no murmurs, no rub and no gallops
GI
normal to inspection, nondistended, normoactive bowel sounds, soft to palpation and non-tender
GI Narrative: 
At the time my examination, there was moderate abdominal distention noted, abdomen was soft and nontender
Extremity
no clubbing, cyanosis or edema
Skin
no rashes or lesions noted
General Skin Exam: no breakdown
Neuro
CN's II-XII intact bilaterally, moves all extremities, no focal motor deficits and no sensory deficits noted
Sensorium / Orientation: awake
Speech: speech normal
Psych
Psych Narrative: 
Patient has flat affect

Assessment & Plan
Assessment/Plan
(1) Encephalopathy acute: 
PLAN: 
Plan
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

 Acute Encephalopathy likely secondary to decompensated liver disease.  Recommendations are Xifaxan 550 mg p.o. twice daily and lactulose 30 cc every 6 hours around-the-clock.  His current MELD is low at 16.  He is a child [place] class C.

Nonalcoholic cirrhosis with underlying liver cancer with associated pancytopenia, chronic/chronic iron deficiency anemia: Patient following with Dr. Ortega, 
- 2/8/24 limited abdominal US with noted ascites in all quadrants. 
- 2/9/24 paracentesis performed with 8200 cc fluid removed and diagnostic evaluation studies requested including cytology. Given amount removed IV albumin ~6 g/L administered with 50 gm 25% albumin administered. 
- 2/13/24 6300 serosanguineous fluid removed.  He will need albumin.  Recommend to start him on midodrine 10 mg p.o. twice daily as it can be associated with decreased amount of ascites as formed along with starting him on 40 mg of Lasix and 50 mg 
of Aldactone.  It can be titrated up as an outpatient hopefully to keep him from having recurrent paracentesis.
-2/14/24-severe decompensated liver disease with persistent hypotension.  He is a child class C with a worsening MELD of 24.  His decompensated liver disease is complicated by hepatic hydrothorax, refractory ascites, hypotension possibly secondary 
to rapid progressive hepatorenal syndrome secondary to frequent large-volume paracentesis.  He has a guarded prognosis.  If he is transferred to the ICU then recommend Levophed, 50 g albumin every 6 hours and octreotide drip at 25 mg an hour.



Charges/Coding
Visit Charges
Inpatient E&M: 36695 [facility] Hosp L3

## 2024-02-14 NOTE — PCM.PN.HOSP
Reason for Visit
Reason for Visit: 
Diagnoses

Anemia in other chronic diseases classified elsewhere (02/08/24)
Obstructive sleep apnea (adult) (pediatric) (02/08/24)
Encephalopathy, unspecified (02/08/24)
Gastro-esophageal reflux disease without esophagitis (02/08/24)
Unspecified cirrhosis of liver (02/08/24)
Gout, unspecified (02/08/24)
Malignant ascites (02/08/24)
Other ascites (02/08/24)
Other symptoms and signs involving the nervous system (02/08/24)
Dependence on other enabling machines and devices (02/08/24)



Subjective
Subjective
Patient was seen and examined today, last night his blood pressure went low and he was given albumin and fluids, today his blood pressure has trended downward, as of this afternoon his blood pressure was in the 60s systolic, I administered IV fluids 
but this did not impact his blood pressure.  I had a long conversation with the patient's POA who came up to see the patient after I called her on the phone, her  also came to the hospital with her, I told her that the options were to 
transfer him to the ICU to place him on pressors but that he has severe liver disease and that ultimately elevating his blood pressure will not be of help in the long run due to his severe liver disease.  The other option would be to administer 
fluids and PCU and not transport the patient to the unit, the final option would be to withdraw care and just provide comfort care measures.  I told her I believed the patient had end-stage liver disease. 

 She was open to having hospice come in and talk with her, hospice came in today and felt that the patient was actively dying and they would not okay transport to the inpatient hospice facility.  Patient's POA elected to make him a comfort care, I 
placed him on IV morphine and atropine.

I talked briefly with gastroenterology, they confirmed that the patient's cirrhosis was advanced.

Objective Data
Objective Data
Vital Signs: 
Vital Signs

Temp Pulse Resp BP Pulse Ox O2 Del Method O2 Flow Rate
 98.7 F   125 H  18   67/55 L  92   Nasal Cannula   5 
 02/14/24 12:00  02/14/24 12:30  02/14/24 12:30  02/14/24 13:00  02/14/24 12:00  02/14/24 14:00  02/14/24 14:00
FiO2
 2 
 02/09/24 12:41



Oxygen Flow Rate (L/min)       5                                                
Oxygen Delivery Method         Nasal Cannula                                    
Weight:                        98 kg                                            
Body Mass Index (BMI)          30.9                                             


Intake & Output: 
Intake and Output for Last 24 Hours

 02/12/24 02/13/24 02/14/24
 23:59 23:59 23:59
Intake Total 1590 / 1590 910 / 910 3855.42 / 3855.42
Output Total 9310 / 9610 8100 / 8100 350 / 350
Balance -7720 / -8020 -7190 / -7190 3505.42 / 3505.42


Lab / Micro Data

02/14/24 13:07          

02/14/24 06:45          

Labs: 
Laboratory Results - last 24 hr

02/14/24 06:45: Sodium 148 H, Potassium 3.8, Chloride 116 H, Carbon Dioxide 24.0, Anion Gap 8, BUN 24 H, Creatinine 1.52 H, Estim Creat Clear Calc 42.47, Est GFR (MDRD) Af Amer 57 L, Est GFR (MDRD) Non-Af 47 L, BUN/Creatinine Ratio 15.8, Glucose 92, 
Calcium 10.8 H
02/14/24 13:07: WBC 11.9 H, RBC 3.21 L, Hgb 10.9 L, Hct 33.2 L, .4 H, MCH 34.0 H, MCHC 32.8, RDW Std Deviation 65.1 H, RDW Coeff of Ilia 17.3 H, Plt Count 58 L, MPV 11.5, Immature Gran % (Auto) 0.300, Neut % (Auto) 97.8 H, Lymph % (Auto) 0.8 
L, Mono % (Auto) 0.6, Eos % (Auto) 0.2, Baso % (Auto) 0.3, Absolute Neuts (auto) 11.7 H, Absolute Lymphs (auto) 0.09 L, Nucleated RBC % 0, Anisocytosis 2+, Total Bilirubin 4.00 H, Direct Bilirubin 3.05 H,  H, ALT 57, Alkaline Phosphatase 532 
H, Total Protein 4.7 L, Albumin 2.5 L, Globulin 2.2


Micro: 
Microbiology

02/12/24 17:24   Sputum, Expectorated/Coughed   Gram Stain - Final
02/12/24 17:24   Sputum, Expectorated/Coughed   Respiratory Culture - Preliminary
                            Gram negative rosales
                            Presumptive C albicans
02/10/24 13:57   Stool   Enteric Bacteriology - Final
02/10/24 10:59   Stool   Clostridioides difficile (PCR) - Final
02/08/24 16:11   Urine Catheter - Catheter   Legionella Antigen - Final
02/08/24 16:11   Urine Catheter - Catheter   Streptococcus pneumoniae Antigen (M - Final
02/07/24 15:15   Mucosa - Nose   SARS-CoV-2, Influenza & RSV (PCR) - Final



Physical Exam
Const
Constitutional Narrative: 
Patient is lethargic, he has trouble speaking, patient looks frail and unwell
HEENT
normocephalic and head/scalp atraumatic
Eyes
PERRL and EOMs intact bilaterally
Neck
no JVD and thyroid normal
General: trachea midline
Resp
Resp Narrative: 
Patient has expiratory rhonchi bilaterally
Auscultation: rales, rhonchi throughout and wheezes
Cardio
Cardio Narrative: 
Heart rate and rhythm is tachycardic and irregular
GI
normal to inspection, nondistended, normoactive bowel sounds, soft to palpation, non-tender and non-distended
Extremity
Extremity Narrative: 
Generalized edema is noted of the lower extremities
Skin
no rashes or lesions noted
General Skin Exam: no breakdown
Neuro
CN's II-XII intact bilaterally
Neuro Narrative: 
Patient is lethargic, he does open his eyes to verbal stimulation and tactile stimulation, he does not carry on a conversation
Psych
Psych Narrative: 
Patient is lethargic and somnolent

Assessment & Plan
Assessment/Plan
(1) Encephalopathy acute: 
PLAN: 
Plan
1.  Acute metabolic encephalopathy-due to end-stage liver disease, patient appears to be actively dying at this time, he was made a comfort care and he will be reevaluated tomorrow morning for possible transfer to hospice if his pressure stabilizes

#2 hypercalcemia-etiology unclear at this time

#3  End-stage cirrhosis

#4 generalized debility due to advanced age and multiple medical problems

#5 acute gastritis-patient is currently on a PPI

#6 esophageal varices-banded by gastroenterology 

#7 hepatic cancer-the patient's hepatic lesion is approximately 12 mm in diameter

#8 urinary retention-patient currently has a Love catheter

#9 paroxysmal atrial fibrillation-patient is not chronically anticoagulated secondary to underlying hepatic disease

Prognosis is poor at this time, comfort measures are instituted, patient is a DNR comfort care at this time

Total clinical time spent by myself addressing the patient's medical issues, reviewing all of his data, and collaborating with patient's care team: 35 minutes

Charges/Coding
Visit Charges
Inpatient E&M: 89823 Subs Hosp L2

## 2024-02-14 NOTE — PN.GI_ITS
Subjective    
Subjective    
Patient is very lethargic and weak.  He has not been eating much.  He has had   
persistently low blood pressures today.  He is currently getting fluid bolus and  
has been responding.    
    
Objective Data    
Objective Data    
Vital Signs:     
Vital Signs    
    
    
    
Temp Pulse Resp BP Pulse Ox O2 Del Method O2 Flow Rate    
     
 98.7 F   125 H  18   67/55 L  92   Nasal Cannula   5     
     
 02/14/24 12:00  02/14/24 12:30  02/14/24 12:30  02/14/24 13:00  02/14/24 12:00   
02/14/24 14:00  02/14/24 14:00    
    
    
    
FiO2    
     
 2     
     
 02/09/24 12:41    
    
    
    
    
Oxygen Flow Rate (L/min)         5                                                
       
Oxygen Delivery Method           Nasal Cannula                                    
       
Weight:                          216 lb 0.848 oz                                  
       
Body Mass Index (BMI)            30.9                                             
       
    
    
Intake & Output:     
                       Intake and Output for Last 24 Hours    
    
    
    
 02/12/24 02/13/24 02/14/24    
    
 23:59 23:59 23:59    
     
Intake Total 1590 / 1590 910 / 910 3470 / 3470    
     
Output Total 9310 / 9610 8100 / 8100 350 / 350    
     
Balance -7720 / -8020 -7190 / -7190 3120 / 3120    
    
    
    
Lab / Micro Data    
    
                                                        02/14/24 13:07              
    
                                                        02/14/24 06:45              
    
Labs:     
                         Laboratory Results - last 24 hr    
    
02/14/24 06:45: Sodium 148 H, Potassium 3.8, Chloride 116 H, Carbon Dioxide   
24.0, Anion Gap 8, BUN 24 H, Creatinine 1.52 H, Estim Creat Clear Calc 42.47,   
Est GFR (MDRD) Af Amer 57 L, Est GFR (MDRD) Non-Af 47 L, BUN/Creatinine Ratio   
15.8, Glucose 92, Calcium 10.8 H    
02/14/24 13:07: WBC 11.9 H, RBC 3.21 L, Hgb 10.9 L, Hct 33.2 L, .4 H, MCH  
34.0 H, MCHC 32.8, RDW Std Deviation 65.1 H, RDW Coeff of Ilia 17.3 H, Plt Count   
58 L, MPV 11.5, Immature Gran % (Auto) 0.300, Neut % (Auto) 97.8 H, Lymph %   
(Auto) 0.8 L, Mono % (Auto) 0.6, Eos % (Auto) 0.2, Baso % (Auto) 0.3, Absolute   
Neuts (auto) 11.7 H, Absolute Lymphs (auto) 0.09 L, Nucleated RBC % 0,   
Anisocytosis 2+, Total Bilirubin 4.00 H, Direct Bilirubin 3.05 H,  H, ALT  
57, Alkaline Phosphatase 532 H, Total Protein 4.7 L, Albumin 2.5 L, Globulin 2.2    
    
    
Micro:     
                                  Microbiology    
    
02/12/24 17:24   Sputum, Expectorated/Coughed   Gram Stain - Final    
02/12/24 17:24   Sputum, Expectorated/Coughed   Respiratory Culture -   
Preliminary    
                            Gram negative rosales    
                            Presumptive C albicans    
02/10/24 13:57   Stool   Enteric Bacteriology - Final    
02/10/24 10:59   Stool   Clostridioides difficile (PCR) - Final    
02/08/24 16:11   Urine Catheter - Catheter   Legionella Antigen - Final    
02/08/24 16:11   Urine Catheter - Catheter   Streptococcus pneumoniae Antigen (M  
- Final    
02/07/24 15:15   Mucosa - Nose   SARS-CoV-2, Influenza & RSV (PCR) - Final    
    
    
    
Physical Exam    
Narrative    
Lethargic and answers questions very slowly    
Const    
no apparent distress    
General Appearance: cooperative and well developed    
Orientation / Consciousness: awake    
HEENT    
normocephalic and head/scalp atraumatic    
Eyes    
PERRL, EOMs intact bilaterally and conjunctivae normal    
Neck    
supple, no JVD, thyroid normal and no carotid bruits    
General: trachea midline    
Resp    
normal respiratory effort, no retractions, no use of accessory muscles and clear  
to auscultation bilaterally    
Auscultation: Negative for rales, rhonchi or wheezes    
Cardio    
regular rate, regular rhythm, S1 normal heart sound, S2 normal heart sound, no   
murmurs, no rub and no gallops    
GI    
normal to inspection, nondistended, normoactive bowel sounds, soft to palpation   
and non-tender    
GI Narrative:     
At the time my examination, there was moderate abdominal distention noted,   
abdomen was soft and nontender    
Extremity    
no clubbing, cyanosis or edema    
Skin    
no rashes or lesions noted    
General Skin Exam: no breakdown    
Neuro    
CN's II-XII intact bilaterally, moves all extremities, no focal motor deficits   
and no sensory deficits noted    
Sensorium / Orientation: awake    
Speech: speech normal    
Psych    
Psych Narrative:     
Patient has flat affect    
    
Assessment & Plan    
Assessment/Plan    
(1) Encephalopathy acute:     
PLAN:     
Plan    
The patient is an 85 y/o M w/ PMHx: Chronic anemia/AOCD, CKD sP, Non-alcoholic   
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   
thrombocytopenia/anemia/mild hyperammonemia complicated by decompensated liver   
disease leading to refractory ascites.    
    
 Acute Encephalopathy likely secondary to decompensated liver disease.    
Recommendations are Xifaxan 550 mg p.o. twice daily and lactulose 30 cc every 6   
hours around-the-clock.  His current MELD is low at 16.  He is a child [place]   
class C.    
    
Nonalcoholic cirrhosis with underlying liver cancer with associated   
pancytopenia, chronic/chronic iron deficiency anemia: Patient following with Dr. Ortega,     
- 2/8/24 limited abdominal US with noted ascites in all quadrants.     
- 2/9/24 paracentesis performed with 8200 cc fluid removed and diagnostic e  
valuation studies requested including cytology. Given amount removed IV albumin   
~6 g/L administered with 50 gm 25% albumin administered.     
- 2/13/24 6300 serosanguineous fluid removed.  He will need albumin.  Recommend   
to start him on midodrine 10 mg p.o. twice daily as it can be associated with   
decreased amount of ascites as formed along with starting him on 40 mg of Lasix   
and 50 mg of Aldactone.  It can be titrated up as an outpatient hopefully to   
keep him from having recurrent paracentesis.    
-2/14/24-severe decompensated liver disease with persistent hypotension.  He is   
a child class C with a worsening MELD of 24.  His decompensated liver disease is  
complicated by hepatic hydrothorax, refractory ascites, hypotension possibly   
secondary to rapid progressive hepatorenal syndrome secondary to frequent large-  
volume paracentesis.  He has a guarded prognosis.  If he is transferred to the   
ICU then recommend Levophed, 50 g albumin every 6 hours and octreotide drip at   
25 mg an hour.    
    
    
    
Charges/Coding    
Visit Charges    
Inpatient E&M: [facility] L[facility]

## 2024-02-15 VITALS
HEART RATE: 134 BPM | DIASTOLIC BLOOD PRESSURE: 55 MMHG | RESPIRATION RATE: 16 BRPM | TEMPERATURE: 97.88 F | OXYGEN SATURATION: 90 % | SYSTOLIC BLOOD PRESSURE: 70 MMHG

## 2024-02-15 VITALS — OXYGEN SATURATION: 94 %

## 2024-02-15 NOTE — CASEMGMT
Addendum entered by Jessica Gibbs  02/15/24 11:50: 

Hospice re-evaluated patient and it was determined patient was okay for transport. ELDA requested Tricia notify ELDA when they will  patient. 

Plan: d/c to [facility] inpatient H[facility] unit. Hospice will transport.

Jessica STEVENS

Original Note:

Patient was not transferred to Hospice yesterday as it was felt patient may not make the transport. However, patient is a little better today and could likely survive transport. ELDA called Tricia at Hospice and she will send an RN around 11 to review 
patient's condition.

Jessica STEVENS

## 2024-02-15 NOTE — DS.PCM_ITS
Providers    
Date of Admission: 02/08/24    
Date of Discharge: 02/15/24    
Primary Care Physician:     
Dr. Júnior Ahuja MD    
    
Consultations    
    
02/07/24 17:57    
Consult: Tele-Neurology Routine     
   Consulting Provider: [facility]    
   Reason for Consult: CVA r/o    
   EMERGENT Consult: No    
   MD Notified: Yes    
   Date Notified: 02/07/24    
   Time Notified: 17:52    
   Method of Notification: Answering Service    
   Nursing Unit Staff Notify [facility] of Tele-Neurology Consult: Yes    
    
02/11/24 10:45    
Consult: Gastroenterology Routine     
   Consulting Provider: [facility]    
   Reason for Consult: CT gastritis, having N/V, does have liver CA, concern for  
CA    
   EMERGENT Consult: No    
   MD Notified: Yes    
   Date Notified: 02/11/24    
   Time Notified: 10:45    
   Method of Notification: Text    
    
    
    
Reason For Visit: CVA R/O    
    
Diagnosis    
Discharge Diagnosis    
(1) Encephalopathy acute:     
      Status: Acute    
      Code(s):    
G93.40 - Encephalopathy, unspecified    
    
Plan    
1.  Acute metabolic encephalopathy-due to end-stage liver disease, patient a  
ppears to be actively dying at this time, he was made a comfort care and he will  
be reevaluated tomorrow morning for possible transfer to hospice if his pressure  
stabilizes    
    
#2 hypercalcemia-etiology unclear at this time    
    
#3  End-stage cirrhosis    
    
#4 generalized debility due to advanced age and multiple medical problems    
    
#5 acute gastritis-patient is currently on a PPI    
    
#6 esophageal varices-banded by gastroenterology     
    
#7 hepatic cancer-the patient's hepatic lesion is approximately 12 mm in diam  
eter    
    
#8 urinary retention-patient currently has a Love catheter    
    
#9 paroxysmal atrial fibrillation-patient is not chronically anticoagulated   
secondary to underlying hepatic disease    
    
Prognosis is poor at this time, comfort measures are instituted, patient is a   
DNR comfort care at this time    
    
Total clinical time spent by myself addressing the patient's medical issues,   
reviewing all of his data, and collaborating with patient's care team: 35   
minutes    
    
Medications at Discharge    
Home Medications    
    
allopurinol 300 mg tablet 300 mg PO DAILY gout 12/27/16     
fenofibrate nanocrystallized 48 mg tablet 48 mg PO DAILY cholesterol 12/27/16     
magnesium oxide 400 mg (241.3 mg magnesium) tablet 400 mg PO DAILY supplement   
12/27/16     
multivit-min-folic acid 0.4 mg-lycopene 300 mcg-lutein 250 mcg tablet 1 ea PO   
DAILY vitamin 12/27/16     
sertraline 100 mg tablet 50 mg PO QHS depression 05/21/20     
aspirin 81 mg tablet,delayed release (Adult Aspirin Regimen) 81 mg PO DAILY   
heart 07/27/21     
ferrous sulfate 325 mg (65 mg iron) tablet 325 mg PO BID supplement 07/27/21     
albuterol sulfate 90 mcg/actuation aerosol inhaler 2 puff inhalation Q6H PRN   
ASTHMA 02/08/22     
fluticasone propionate 50 mcg/actuation nasal spray,suspension 1 spray   
intranasal DAILY nasal 02/08/22     
omeprazole 40 mg capsule,delayed release 40 mg PO DAILY reflux 02/08/22     
oxybutynin chloride 15 mg tablet,extended release 24 hr 30 mg PO DAILY bladder   
02/08/22     
ascorbic acid (vitamin C) 500 mg capsule 500 mg PO BID supplement 01/31/23     
furosemide 20 mg tablet 20 mg PO Q OTHER DAY heart 01/31/23     
nitroglycerin 0.4 mg sublingual tablet 0.4 mg sublingual Q5M PRN chest pain   
01/31/23     
cyanocobalamin (vitamin B-12) 5,000 mcg capsule 2,500 mcg PO DAILY supplement   
07/15/23     
rifaximin 550 mg tablet (Xifaxan) 550 mg PO BID liver 07/15/23     
diltiazem HCl 120 mg capsule,extended release 24 hr 120 mg PO DAILY #30 caps   
08/23/23     
metoprolol tartrate 25 mg tablet 25 mg PO BID #60 tabs 08/23/23     
fluticasone furoate 200 mcg-vilanterol 25 mcg/dose inhalation powder (Breo   
Ellipta) 1 inh inhalation DAILY breathing 12/21/23     
cefdinir 300 mg capsule 300 mg PO BID antibiotic 4 days #8 caps 12/26/23     
ferrous sulfate 325 mg (65 mg iron) tablet,delayed release mg PO 02/07/24     
    
    
    
Hospital Course    
Operations    
None    
Procedures    
EGD and Paracardiocentesis    
Summary of Care Provided    
Minutes Spent on Discharge: 31    
Hospital Course:     
This 84-year-old white male was seen in the emergency room at [facility] after being brought in for evaluation of altered mental status, patient  
has a history of cirrhosis and primary liver cancer.  Patient was not able to   
give a history to the emergency room physician, labs were obtained, his white   
blood cell count was normal, platelets were low at 58,000, chest x-ray showed   
blunting of the right costophrenic angle, CT of the brain was negative.  Ammonia  
level slightly high at 34.  Patient was admitted to PCU for altered mental   
status and elevated ammonia level and debility, he was seen in consultation by   
gastroenterology and underwent an EGD which showed grade 2 esophageal varices   
which were banded, and acute gastritis.  Patient was seen by PT and OT but   
remains frail and debilitated during his hospital stay.  Underwent 2   
paracentesis which removed several liters of fluid each time, despite this,   
patient decompensated and it was felt he was going into liver failure.    
Conversations were carried out with the family and they agreed to hospice   
consultation, patient was made a comfort care and he was kept comfortable and   
the following day he was transferred to the hospice care facility in stable but   
terminal condition.    
    
On 2/15/2024, patient was seen and examined: Patient appeared moribund and   
unwell, lungs are clear bilaterally, heart rate and rhythm was tachycardic,   
patient was not alert but responded to painful stimuli.    
    
Patient was transferred to inpatient hospice facility on 2/15/2024    
    
Weight / BMI    
                                     Weight    
    
    
    
Weight:                        102.2 kg                                           
    
     
Body Mass Index (BMI)          32.3                                               
    
    
    
    
    
ABG / Lab / Microbiology Data    
    
                                                        02/14/24 13:07              
    
                                                        02/14/24 06:45              
    
Laboratory:     
                         Laboratory Results - last 24 hr    
    
02/14/24 13:07: Anisocytosis 2+, Total Bilirubin 4.00 H, Direct Bilirubin 3.05 H  
,  H, ALT 57, Alkaline Phosphatase 532 H, Total Protein 4.7 L, Albumin   
2.5 L, Globulin 2.2    
    
    
Microbiology:     
                                  Microbiology    
    
02/12/24 17:24   Sputum, Expectorated/Coughed   Gram Stain - Final    
02/12/24 17:24   Sputum, Expectorated/Coughed   Respiratory Culture -   
Preliminary    
                            GNR lactose     
                            Presumptive C albicans    
02/10/24 13:57   Stool   Enteric Bacteriology - Final    
02/10/24 10:59   Stool   Clostridioides difficile (PCR) - Final    
02/08/24 16:11   Urine Catheter - Catheter   Legionella Antigen - Final    
02/08/24 16:11   Urine Catheter - Catheter   Streptococcus pneumoniae Antigen (M  
- Final    
02/07/24 15:15   Mucosa - Nose   SARS-CoV-2, Influenza & RSV (PCR) - Final    
    
    
    
Meaningful Use Info    
Meaningful Use Diagnoses (Choose all that apply): None applicable    
    
Discharge Plan    
Admission    
Admit Date/Time: 02/08/24 13:31    
    
Attending Provider: Pato Hancock    
    
Primary Care Provider: Júnior Ahuja    
    
Consulting Providers: Tomas Merrill; Dennys Casanova; Sil Jones; Freda Farooq;   
Yamini Vergara; Jonathan Hernandez; Marie James; Alexy Clarke; Prosper Riley;   
Danna Santoro; Darien Zayas; Annie Riley; Jean Marie Plata; Roge Davis; Gavino Byrnes; Maria Ines Perez; Shekhar Sepulveda; Loli Guerrier; Joanne Mueller;  
Pebbles Schwartz; Ashley Reyes; Randy Palma; Mark Womack; SAMY SY;   
Phillip Alvarado; Samara Billings; Marilin Spicer    
    
Instructions    
Patient Instructions:  RAD RN Paracentesis Dc    
    
Discharge Orders/Prescriptions    
Prescriptions:    
No Action    
  ferrous sulfate 325 mg (65 mg iron) tablet     
   325 mg PO BID     
  aspirin [Adult Aspirin Regimen] 81 mg tablet,delayed release (DR/EC)     
   81 mg PO DAILY     
  oxybutynin chloride 15 mg tablet extended release 24hr     
   30 mg PO DAILY     
  albuterol sulfate 90 mcg/actuation HFA aerosol inhaler     
   2 puff inhalation Q6H PRN (Reason: ASTHMA)     
  fluticasone propionate 50 mcg/actuation spray,suspension     
   1 spray intranasal DAILY     
  omeprazole 40 mg capsule,delayed release(DR/EC)     
   40 mg PO DAILY     
  furosemide 20 mg tablet     
   20 mg PO Q OTHER DAY     
  ascorbic acid (vitamin C) 500 mg capsule     
   500 mg PO BID     
  nitroglycerin 0.4 mg tablet, sublingual     
   0.4 mg sublingual Q5M PRN (Reason: chest pain)     
   Rx Instructions:    
   do not exceed 3 doses per episode    
  magnesium oxide 400 MG tablet     
   400 mg PO DAILY     
  allopurinol 300 MG tablet     
   300 mg PO DAILY     
   Patient Comments:    
   GOUT    
  multivit-min-FA-lycopen-lutein 1 EACH tablet     
   1 ea PO DAILY     
  fenofibrate nanocrystallized 48 MG tablet     
   48 mg PO DAILY     
   Patient Comments:    
   CHOLESTEROL    
  sertraline 100 MG tablet     
   50 mg PO QHS     
  diltiazem HCl 120 mg capsule,extended release 24hr     
   120 mg PO DAILY Qty: 30 0RF    
  metoprolol tartrate 25 mg tablet     
   25 mg PO BID Qty: 60 0RF    
  Xifaxan 550 mg tablet     
   550 mg PO BID     
  cyanocobalamin (vitamin B-12) 5,000 mcg capsule     
   2,500 mcg PO DAILY     
  fluticasone furoate-vilanterol [Breo Ellipta] 200-25 mcg/dose blister with   
device     
   1 inh inhalation DAILY     
  cefdinir 300 mg capsule     
   300 mg PO BID 4 Days Qty: 8 0RF    
  ferrous sulfate 325 mg (65 mg iron) tablet,delayed release (DR/EC)     
     PO      
    
Referrals / Follow Up:    
Júnior Ahuja MD [Primary Care Provider] -     
    
Disposition    
Disposition (needs filled in before D/C Order can be placed): Hospice in Medical  
Facility    
    
    
Charges/Coding    
Visit Charges    
Inpatient E&M: 94764 Disch Hosp >30min

## 2024-02-15 NOTE — PCM.DC.SUM
Providers
Date of Admission: 02/08/24
Date of Discharge: 02/15/24
Primary Care Physician: 
Dr. Júnior Ahuja MD

Consultations

02/07/24 17:57
Consult: Tele-Neurology Routine 
 Consulting Provider: [facility]
 Reason for Consult: CVA r/o
 EMERGENT Consult: No
 MD Notified: Yes
 Date Notified: 02/07/24
 Time Notified: 17:52
 Method of Notification: Answering Service
 Nursing Unit Staff Notify [facility] of Tele-Neurology Consult: Yes

02/11/24 10:45
Consult: Gastroenterology Routine 
 Consulting Provider: [facility]
 Reason for Consult: CT gastritis, having N/V, does have liver CA, concern for CA
 EMERGENT Consult: No
 MD Notified: Yes
 Date Notified: 02/11/24
 Time Notified: 10:45
 Method of Notification: Text



Reason For Visit: CVA R/O

Diagnosis
Discharge Diagnosis
(1) Encephalopathy acute: 
      Status: Acute
      Code(s):
G93.40 - Encephalopathy, unspecified

Plan
1.  Acute metabolic encephalopathy-due to end-stage liver disease, patient appears to be actively dying at this time, he was made a comfort care and he will be reevaluated tomorrow morning for possible transfer to hospice if his pressure stabilizes

#2 hypercalcemia-etiology unclear at this time

#3  End-stage cirrhosis

#4 generalized debility due to advanced age and multiple medical problems

#5 acute gastritis-patient is currently on a PPI

#6 esophageal varices-banded by gastroenterology 

#7 hepatic cancer-the patient's hepatic lesion is approximately 12 mm in diameter

#8 urinary retention-patient currently has a Love catheter

#9 paroxysmal atrial fibrillation-patient is not chronically anticoagulated secondary to underlying hepatic disease

Prognosis is poor at this time, comfort measures are instituted, patient is a DNR comfort care at this time

Total clinical time spent by myself addressing the patient's medical issues, reviewing all of his data, and collaborating with patient's care team: 35 minutes

Medications at Discharge
Home Medications

allopurinol 300 mg tablet 300 mg PO DAILY gout 12/27/16 
fenofibrate nanocrystallized 48 mg tablet 48 mg PO DAILY cholesterol 12/27/16 
magnesium oxide 400 mg (241.3 mg magnesium) tablet 400 mg PO DAILY supplement 12/27/16 
multivit-min-folic acid 0.4 mg-lycopene 300 mcg-lutein 250 mcg tablet 1 ea PO DAILY vitamin 12/27/16 
sertraline 100 mg tablet 50 mg PO QHS depression 05/21/20 
aspirin 81 mg tablet,delayed release (Adult Aspirin Regimen) 81 mg PO DAILY heart 07/27/21 
ferrous sulfate 325 mg (65 mg iron) tablet 325 mg PO BID supplement 07/27/21 
albuterol sulfate 90 mcg/actuation aerosol inhaler 2 puff inhalation Q6H PRN ASTHMA 02/08/22 
fluticasone propionate 50 mcg/actuation nasal spray,suspension 1 spray intranasal DAILY nasal 02/08/22 
omeprazole 40 mg capsule,delayed release 40 mg PO DAILY reflux 02/08/22 
oxybutynin chloride 15 mg tablet,extended release 24 hr 30 mg PO DAILY bladder 02/08/22 
ascorbic acid (vitamin C) 500 mg capsule 500 mg PO BID supplement 01/31/23 
furosemide 20 mg tablet 20 mg PO Q OTHER DAY heart 01/31/23 
nitroglycerin 0.4 mg sublingual tablet 0.4 mg sublingual Q5M PRN chest pain 01/31/23 
cyanocobalamin (vitamin B-12) 5,000 mcg capsule 2,500 mcg PO DAILY supplement 07/15/23 
rifaximin 550 mg tablet (Xifaxan) 550 mg PO BID liver 07/15/23 
diltiazem HCl 120 mg capsule,extended release 24 hr 120 mg PO DAILY #30 caps 08/23/23 
metoprolol tartrate 25 mg tablet 25 mg PO BID #60 tabs 08/23/23 
fluticasone furoate 200 mcg-vilanterol 25 mcg/dose inhalation powder (Breo Ellipta) 1 inh inhalation DAILY breathing 12/21/23 
cefdinir 300 mg capsule 300 mg PO BID antibiotic 4 days #8 caps 12/26/23 
ferrous sulfate 325 mg (65 mg iron) tablet,delayed release mg PO 02/07/24 



Hospital Course
Operations
None
Procedures
EGD and Paracardiocentesis
Summary of Care Provided
Minutes Spent on Discharge: 31
Hospital Course: 
This 84-year-old white male was seen in the emergency room at [facility] after being brought in for evaluation of altered mental status, patient has a history of cirrhosis and primary liver cancer.  Patient was not able to give a 
history to the emergency room physician, labs were obtained, his white blood cell count was normal, platelets were low at 58,000, chest x-ray showed blunting of the right costophrenic angle, CT of the brain was negative.  Ammonia level slightly high 
at 34.  Patient was admitted to PCU for altered mental status and elevated ammonia level and debility, he was seen in consultation by gastroenterology and underwent an EGD which showed grade 2 esophageal varices which were banded, and acute 
gastritis.  Patient was seen by PT and OT but remains frail and debilitated during his hospital stay.  Underwent 2 paracentesis which removed several liters of fluid each time, despite this, patient decompensated and it was felt he was going into 
liver failure.  Conversations were carried out with the family and they agreed to hospice consultation, patient was made a comfort care and he was kept comfortable and the following day he was transferred to the hospice care facility in stable but 
terminal condition.

On 2/15/2024, patient was seen and examined: Patient appeared moribund and unwell, lungs are clear bilaterally, heart rate and rhythm was tachycardic, patient was not alert but responded to painful stimuli.

Patient was transferred to inpatient hospice facility on 2/15/2024

Weight / BMI
Weight

Weight:                        102.2 kg                                          
Body Mass Index (BMI)          32.3                                              



ABG / Lab / Microbiology Data

02/14/24 13:07          

02/14/24 06:45          

Laboratory: 
Laboratory Results - last 24 hr

02/14/24 13:07: Anisocytosis 2+, Total Bilirubin 4.00 H, Direct Bilirubin 3.05 H,  H, ALT 57, Alkaline Phosphatase 532 H, Total Protein 4.7 L, Albumin 2.5 L, Globulin 2.2


Microbiology: 
Microbiology

02/12/24 17:24   Sputum, Expectorated/Coughed   Gram Stain - Final
02/12/24 17:24   Sputum, Expectorated/Coughed   Respiratory Culture - Preliminary
                            GNR lactose 
                            Presumptive C albicans
02/10/24 13:57   Stool   Enteric Bacteriology - Final
02/10/24 10:59   Stool   Clostridioides difficile (PCR) - Final
02/08/24 16:11   Urine Catheter - Catheter   Legionella Antigen - Final
02/08/24 16:11   Urine Catheter - Catheter   Streptococcus pneumoniae Antigen (M - Final
02/07/24 15:15   Mucosa - Nose   SARS-CoV-2, Influenza & RSV (PCR) - Final



Meaningful Use Info
Meaningful Use Diagnoses (Choose all that apply): None applicable

Discharge Plan
Admission
Admit Date/Time: 02/08/24 13:31

Attending Provider: Pato Hancock

Primary Care Provider: Júnior Ahuja

Consulting Providers: Tomas Merrill; Dennys Casanova; Sil Jones; Freda Farooq; Yamini Vergara; Jonathan Hernandez; Marie James; Alexy Clarke; Prosper Riley; Danna Santoro; Darien Zayas; Annie Riley; Jean Marie Plata; Roge Davis; Gavino Byrnes; 
Maria Ines Perez; Shekhar Sepulveda; Loli Guerrier; Joanne Mueller; Pebbles Schwartz; Ashley Reyes; Randy Palma; Mark Womack; SAMY SY; Phillip Alvarado; Samara Billings; Marilin Spicer

Instructions
Patient Instructions:  RAD RN Paracentesis Dc

Discharge Orders/Prescriptions
Prescriptions:
No Action
  ferrous sulfate 325 mg (65 mg iron) tablet 
   325 mg PO BID 
  aspirin [Adult Aspirin Regimen] 81 mg tablet,delayed release (DR/EC) 
   81 mg PO DAILY 
  oxybutynin chloride 15 mg tablet extended release 24hr 
   30 mg PO DAILY 
  albuterol sulfate 90 mcg/actuation HFA aerosol inhaler 
   2 puff inhalation Q6H PRN (Reason: ASTHMA) 
  fluticasone propionate 50 mcg/actuation spray,suspension 
   1 spray intranasal DAILY 
  omeprazole 40 mg capsule,delayed release(DR/EC) 
   40 mg PO DAILY 
  furosemide 20 mg tablet 
   20 mg PO Q OTHER DAY 
  ascorbic acid (vitamin C) 500 mg capsule 
   500 mg PO BID 
  nitroglycerin 0.4 mg tablet, sublingual 
   0.4 mg sublingual Q5M PRN (Reason: chest pain) 
   Rx Instructions:
   do not exceed 3 doses per episode
  magnesium oxide 400 MG tablet 
   400 mg PO DAILY 
  allopurinol 300 MG tablet 
   300 mg PO DAILY 
   Patient Comments:
   GOUT
  multivit-min-FA-lycopen-lutein 1 EACH tablet 
   1 ea PO DAILY 
  fenofibrate nanocrystallized 48 MG tablet 
   48 mg PO DAILY 
   Patient Comments:
   CHOLESTEROL
  sertraline 100 MG tablet 
   50 mg PO QHS 
  diltiazem HCl 120 mg capsule,extended release 24hr 
   120 mg PO DAILY Qty: 30 0RF
  metoprolol tartrate 25 mg tablet 
   25 mg PO BID Qty: 60 0RF
  Xifaxan 550 mg tablet 
   550 mg PO BID 
  cyanocobalamin (vitamin B-12) 5,000 mcg capsule 
   2,500 mcg PO DAILY 
  fluticasone furoate-vilanterol [Breo Ellipta] 200-25 mcg/dose blister with device 
   1 inh inhalation DAILY 
  cefdinir 300 mg capsule 
   300 mg PO BID 4 Days Qty: 8 0RF
  ferrous sulfate 325 mg (65 mg iron) tablet,delayed release (DR/EC) 
     PO  

Referrals / Follow Up:
Júnior Ahuja MD [Primary Care Provider] - 

Disposition
Disposition (needs filled in before D/C Order can be placed): Hospice in Medical Facility


Charges/Coding
Visit Charges
Inpatient E&M: 12630 Disch Hosp >30min